# Patient Record
Sex: MALE | Race: BLACK OR AFRICAN AMERICAN | NOT HISPANIC OR LATINO | Employment: OTHER | URBAN - METROPOLITAN AREA
[De-identification: names, ages, dates, MRNs, and addresses within clinical notes are randomized per-mention and may not be internally consistent; named-entity substitution may affect disease eponyms.]

---

## 2017-07-31 ENCOUNTER — OFFICE VISIT (OUTPATIENT)
Dept: UROLOGY | Facility: CLINIC | Age: 42
End: 2017-07-31
Payer: MEDICAID

## 2017-07-31 VITALS
WEIGHT: 315 LBS | BODY MASS INDEX: 40.43 KG/M2 | DIASTOLIC BLOOD PRESSURE: 89 MMHG | SYSTOLIC BLOOD PRESSURE: 138 MMHG | HEIGHT: 74 IN | HEART RATE: 86 BPM

## 2017-07-31 DIAGNOSIS — N43.3 HYDROCELE, UNSPECIFIED HYDROCELE TYPE: Primary | ICD-10-CM

## 2017-07-31 LAB
BILIRUB SERPL-MCNC: NORMAL MG/DL
BLOOD URINE, POC: NORMAL
COLOR, POC UA: YELLOW
GLUCOSE UR QL STRIP: NORMAL
KETONES UR QL STRIP: NORMAL
LEUKOCYTE ESTERASE URINE, POC: NORMAL
NITRITE, POC UA: NORMAL
PH, POC UA: 5
PROTEIN, POC: 100
SPECIFIC GRAVITY, POC UA: 1.02
UROBILINOGEN, POC UA: NORMAL

## 2017-07-31 PROCEDURE — 99214 OFFICE O/P EST MOD 30 MIN: CPT | Mod: 25,S$PBB,, | Performed by: UROLOGY

## 2017-07-31 PROCEDURE — 99999 PR PBB SHADOW E&M-EST. PATIENT-LVL III: CPT | Mod: PBBFAC,,, | Performed by: UROLOGY

## 2017-07-31 PROCEDURE — 99213 OFFICE O/P EST LOW 20 MIN: CPT | Mod: PBBFAC,PO | Performed by: UROLOGY

## 2017-07-31 PROCEDURE — 55000 DRAINAGE OF HYDROCELE: CPT | Mod: PBBFAC,PO | Performed by: UROLOGY

## 2017-07-31 PROCEDURE — 55000 DRAINAGE OF HYDROCELE: CPT | Mod: S$PBB,,, | Performed by: UROLOGY

## 2017-07-31 PROCEDURE — 81002 URINALYSIS NONAUTO W/O SCOPE: CPT | Mod: PBBFAC,PO | Performed by: UROLOGY

## 2017-07-31 NOTE — PROGRESS NOTES
Subjective:       Patient ID: Paul Li is a 41 y.o. male.    Chief Complaint: Other (hydrocele)    HPI     41 year old with a lrage right hydrocele.  It has been present for year.  Previously seen by Dr. Spangler.  Scrotal ultrasound confirmed a large right hydrocele.  He say it has continued to enlarge and is no causing significant discomfort.  He has no voiding complaints.  He denies hematuria and dysuria.  He previously elected observation but due to discomfort he wants treatment.   He also has been having worsening lower extremity swelling and is scheduled to see PCP.  He has no LE pain.  We discussed treatment options for the hydrocele and he wants needle drainage.  He known that this fluid will most likely reacumulate over time.  Urine dipstick shows negative for nitrites, leukocytes, red blood cells, positive for protein.    Review of Systems   Constitutional: Negative for fever.   Genitourinary: Negative for dysuria and hematuria.       Objective:      Physical Exam   Constitutional: He is oriented to person, place, and time. He appears well-developed and well-nourished.   HENT:   Head: Normocephalic and atraumatic.   Eyes: Conjunctivae are normal.   Cardiovascular: Normal rate.    Pulmonary/Chest: Effort normal.   Abdominal: Soft. Hernia confirmed negative in the right inguinal area and confirmed negative in the left inguinal area.   Genitourinary: Penis normal. Mass: large right hydrocele. Left testis shows no mass and no tenderness.   Musculoskeletal: Normal range of motion.   Lymphadenopathy: No inguinal adenopathy noted on the right or left side.   Neurological: He is alert and oriented to person, place, and time.   Skin: Skin is warm and dry. No rash noted.   Psychiatric: He has a normal mood and affect.   Vitals reviewed.      Procedure     Right hydrocele drainage:     The scrotal was prepped and draped sterilely. Using sterile technique, I introduced a Jelco needle into the hydrocele sac. I then  aspirated approximately 640 milliliter(s) of straw-colored fluid. He tolerated the procedure well and there were no complications there was no bleeding from the injection site. No path was sent.      Assessment:       1. Hydrocele, unspecified hydrocele type        Plan:       Hydrocele, unspecified hydrocele type  -     POCT urine dipstick without microscope

## 2017-10-24 ENCOUNTER — OFFICE VISIT (OUTPATIENT)
Dept: FAMILY MEDICINE | Facility: CLINIC | Age: 42
End: 2017-10-24
Payer: MEDICAID

## 2017-10-24 VITALS
TEMPERATURE: 98 F | BODY MASS INDEX: 40.43 KG/M2 | HEART RATE: 68 BPM | DIASTOLIC BLOOD PRESSURE: 80 MMHG | HEIGHT: 74 IN | WEIGHT: 315 LBS | SYSTOLIC BLOOD PRESSURE: 170 MMHG | RESPIRATION RATE: 18 BRPM

## 2017-10-24 DIAGNOSIS — R60.0 LEG EDEMA, RIGHT: ICD-10-CM

## 2017-10-24 DIAGNOSIS — R06.83 SNORING: ICD-10-CM

## 2017-10-24 DIAGNOSIS — M25.561 RIGHT KNEE PAIN, UNSPECIFIED CHRONICITY: ICD-10-CM

## 2017-10-24 DIAGNOSIS — I10 HYPERTENSION, UNSPECIFIED TYPE: ICD-10-CM

## 2017-10-24 DIAGNOSIS — Z00.00 PREVENTATIVE HEALTH CARE: Primary | ICD-10-CM

## 2017-10-24 DIAGNOSIS — E66.01 MORBID OBESITY WITH BMI OF 50.0-59.9, ADULT: ICD-10-CM

## 2017-10-24 PROCEDURE — 90471 IMMUNIZATION ADMIN: CPT | Mod: PBBFAC,PO

## 2017-10-24 PROCEDURE — 99214 OFFICE O/P EST MOD 30 MIN: CPT | Mod: PBBFAC,PO | Performed by: FAMILY MEDICINE

## 2017-10-24 PROCEDURE — 99396 PREV VISIT EST AGE 40-64: CPT | Mod: S$PBB,,, | Performed by: FAMILY MEDICINE

## 2017-10-24 PROCEDURE — 90715 TDAP VACCINE 7 YRS/> IM: CPT | Mod: PBBFAC,PO

## 2017-10-24 PROCEDURE — 99999 PR PBB SHADOW E&M-EST. PATIENT-LVL IV: CPT | Mod: PBBFAC,,, | Performed by: FAMILY MEDICINE

## 2017-10-24 RX ORDER — LISINOPRIL 20 MG/1
20 TABLET ORAL DAILY
Qty: 30 TABLET | Refills: 11 | Status: SHIPPED | OUTPATIENT
Start: 2017-10-24 | End: 2018-05-21 | Stop reason: DRUGHIGH

## 2017-10-24 NOTE — MEDICAL/APP STUDENT
Subjective:       Patient ID: Paul Li is a 42 y.o. male.    Chief Complaint: Knee pain and edema  Knee pain  - 1 year hx edema right lower extremity  - past Friday - pain - extreme edema in knee  - received in ED hydrocodone (pain), anti-inflammatory, and likely a steroid shot, possibly lasix as pt reports excessive urination after meds    HTN  - trial lisinopril 20mg  - avoid CCBs for now due to     Annual exam  - labs: CBC, Lipids, HbA1C    Weight loss  - previously trialed vyvanse or adderall - with positive results  - pt is interested in gastric sleeve  - brother had gastric sleeve with positive results  - pt intends to return to gym - wanted to evaluate his HTN prior to starting exercise regimen    Hx hydrocele   - Previous scrotal ultrasound confirmed a large right hydrocele - needle drainage by Dr. Burnette  -  no voiding complaints, denies hematuria and dysuria.     Diet  - pt cut out rice, past, and seafood  - counseled on lean protein and reducing carbs      Review of Systems   Constitutional: Negative.    HENT: Negative.    Respiratory: Positive for shortness of breath.    Cardiovascular: Negative.    Gastrointestinal: Negative.    Genitourinary: Negative.    Musculoskeletal: Positive for joint swelling.   Skin: Negative.    Neurological: Negative.    Psychiatric/Behavioral: Negative.        Objective:      Physical Exam   Constitutional: He is oriented to person, place, and time. He appears well-developed and well-nourished.   Neck: Normal range of motion.   Cardiovascular: Normal rate, regular rhythm, normal heart sounds and intact distal pulses.    No murmur heard.  Pulmonary/Chest: Effort normal and breath sounds normal. No respiratory distress. He has no wheezes.   Abdominal: Soft. Bowel sounds are normal.   Musculoskeletal: He exhibits edema.        Right knee: He exhibits swelling.   Lymphadenopathy:     He has no cervical adenopathy.   Neurological: He is alert and oriented to person, place,  and time.   Skin: Skin is warm and dry.   Psychiatric: He has a normal mood and affect. His behavior is normal. Judgment and thought content normal.       Assessment:   Knee pain  Lower leg edema  HTN  Annual       Plan:   Knee pain  - ACL, PCL, MCL, LCL all normal  - lower leg edema may be contributing to knee edema  - obtain R knee xrays from outside hospital     Right leg edema  - US right lower extremity - rule out DVT  - start Lasix 20mg    HTN  - trial lisinopril 20mg  - avoid CCBs for now due     Annual exam  - labs: CBC, Lipids, HbA1C    Weight loss  - previously trialed vyvanse or adderall - with positive results    F/u 3 months with A1C

## 2017-10-24 NOTE — PROGRESS NOTES
Subjective:       Patient ID: Paul Li is a 42 y.o. male.    Chief Complaint: Preventative Health Care (Physical: [place]) and Knee Pain    Here for a general exam.  He was previously seeing Dr. Chamberlain.    - 1 year hx edema right lower extremity; no injury or previous surgery    Knee pain  - past Friday - pain - extreme pain and edema in knee that started in the middle of the night  - received in ED hydrocodone (pain), anti-inflammatory, and likely a steroid shot, possibly lasix as pt reports excessive urination after meds     HTN  - never has taken medication for this in the past      Weight loss  - previously trialed phenteramine- with positive results  - pt is interested in gastric sleeve  - brother had gastric sleeve with positive results  - pt intends to return to gym - wanted to evaluate his HTN prior to starting exercise regimen   pt cut out rice, past, and seafood     Hx hydrocele   - Previous scrotal ultrasound confirmed a large right hydrocele - needle drainage by Dr. Burnette  -  no voiding complaints, denies hematuria and dysuria.     He is a  and gets a CDL physical every 2 years. Last one was 6 months ago and sleep study was suggested.          Past Medical History:   Diagnosis Date    HTN (hypertension)     Leg edema, right     Prediabetes        No past surgical history on file.    Review of patient's allergies indicates:  No Known Allergies    Social History     Social History    Marital status:      Spouse name: N/A    Number of children: 4    Years of education: N/A     Occupational History          Social History Main Topics    Smoking status: Former Smoker     Types: Cigarettes     Quit date: 11/23/2013    Smokeless tobacco: Former User     Quit date: 11/23/2013      Comment: e cigs    Alcohol use Not on file    Drug use: Unknown    Sexual activity: Not on file     Other Topics Concern    Not on file     Social History Narrative    [place]       No current outpatient prescriptions on file prior to visit.     No current facility-administered medications on file prior to visit.        Family History   Problem Relation Age of Onset    Hypertension Mother     Diabetes Mother     Hypertension Father     Colon cancer Maternal Grandfather     Breast cancer Paternal Grandmother        Review of Systems   Constitutional: Negative for appetite change, chills, fever and unexpected weight change.   HENT: Negative for sore throat and trouble swallowing.    Eyes: Negative for pain and visual disturbance.   Respiratory: Negative for cough, chest tightness, shortness of breath and wheezing.    Cardiovascular: Positive for leg swelling (right leg). Negative for chest pain and palpitations.   Gastrointestinal: Negative for abdominal pain, blood in stool, constipation, diarrhea and nausea.   Genitourinary: Negative for difficulty urinating, dysuria and hematuria.   Musculoskeletal: Positive for arthralgias and back pain. Negative for gait problem and neck pain.   Skin: Negative for rash and wound.   Neurological: Negative for dizziness, weakness, light-headedness and headaches.   Hematological: Negative for adenopathy.   Psychiatric/Behavioral: Negative for dysphoric mood.       Objective:      BP (!) 170/80 Comment: Wrist  Pulse 68   Temp 98 °F (36.7 °C) (Oral)   Resp 18   Ht 6' 2" (1.88 m)   Wt (!) 192.9 kg (425 lb 4.3 oz)   BMI 54.60 kg/m²   Physical Exam   Constitutional: He is oriented to person, place, and time. He appears well-developed and well-nourished. He is active. No distress.   HENT:   Head: Normocephalic and atraumatic.   Right Ear: External ear normal.   Left Ear: External ear normal.   Mouth/Throat: Uvula is midline, oropharynx is clear and moist and mucous membranes are normal. No oropharyngeal exudate.   Eyes: Conjunctivae, EOM and lids are normal. Pupils are equal, round, and reactive to light.   Neck: Normal range of motion, full " passive range of motion without pain and phonation normal. Neck supple. No thyroid mass and no thyromegaly present.   Cardiovascular: Normal rate, regular rhythm, normal heart sounds and intact distal pulses.  Exam reveals no gallop and no friction rub.    No murmur heard.  Pulmonary/Chest: Effort normal and breath sounds normal. No respiratory distress. He has no wheezes. He has no rales.   Musculoskeletal: Normal range of motion.        Right knee: Normal.        Right lower leg: He exhibits edema.        Left lower leg: He exhibits no edema.   Lymphadenopathy:     He has no cervical adenopathy.   Neurological: He is alert and oriented to person, place, and time. No cranial nerve deficit. Coordination normal.   Skin: Skin is warm and dry.   Psychiatric: He has a normal mood and affect. His speech is normal and behavior is normal. Judgment and thought content normal.       Assessment:       1. Preventative health care    2. Leg edema, right    3. Morbid obesity with BMI of 50.0-59.9, adult    4. Hypertension, unspecified type    5. Snoring        Plan:       Preventative health care  -     Tdap Vaccine  -     TSH; Future; Expected date: 10/24/2017  -     Comprehensive metabolic panel; Future; Expected date: 10/24/2017  -     Hemoglobin A1c; Future; Expected date: 10/24/2017  -     Lipid panel; Future; Expected date: 10/24/2017  -     CBC auto differential; Future; Expected date: 10/24/2017    Leg edema, right  -     US Lower Extremity Veins Right; Future; Expected date: 10/24/2017  -     Uric acid; Future; Expected date: 04/22/2018    Morbid obesity with BMI of 50.0-59.9, adult  -     Ambulatory consult to Bariatric Surgery  -     Ambulatory consult to Sleep Disorders    Hypertension, unspecified type  -     lisinopril (PRINIVIL,ZESTRIL) 20 MG tablet; Take 1 tablet (20 mg total) by mouth once daily.  Dispense: 30 tablet; Refill: 11    Snoring  -     Ambulatory consult to Sleep Disorders    Other orders  -      Cancel: Lipid panel; Future; Expected date: 10/24/2017        Begin lisinopril and f/u in 1 month to recheck BP  Labs soon  Discussed need for aggressive weight loss due to multiple associated comorbid factors  Begin walking program and general calorie reduction  Suspect recent episode of acute right knee pain may have been related to gout episode  Counseled on regular exercise, maintenance of a healthy weight, balanced diet rich in fruits/vegetables and lean protein, and avoidance of unhealthy habits like smoking and excessive alcohol intake.

## 2017-10-25 ENCOUNTER — TELEPHONE (OUTPATIENT)
Dept: FAMILY MEDICINE | Facility: CLINIC | Age: 42
End: 2017-10-25

## 2017-10-25 NOTE — TELEPHONE ENCOUNTER
Appt scheduled.  States he will have commercial insurance soon, once that becomes effective we will schedule consultation bariatric surgeon.

## 2017-10-25 NOTE — TELEPHONE ENCOUNTER
"----- Message from Sylvia Martinez sent at 10/24/2017  6:33 PM CDT -----  Contact: self at check out  Dr Arroyo saw this gentleman Tuesday 10- and referred him to Bariatric Surgery. [facility] does not take his insurance. Please advise the patient.    Also Dr Arroyo wants to see him back in 4 weeks. There are no "B" openings for me to schedule.    Thank you  "

## 2017-10-28 ENCOUNTER — LAB VISIT (OUTPATIENT)
Dept: LAB | Facility: HOSPITAL | Age: 42
End: 2017-10-28
Attending: FAMILY MEDICINE
Payer: MEDICAID

## 2017-10-28 DIAGNOSIS — R60.0 LEG EDEMA, RIGHT: ICD-10-CM

## 2017-10-28 DIAGNOSIS — Z00.00 PREVENTATIVE HEALTH CARE: ICD-10-CM

## 2017-10-28 LAB
ALBUMIN SERPL BCP-MCNC: 3.7 G/DL
ALP SERPL-CCNC: 85 U/L
ALT SERPL W/O P-5'-P-CCNC: 32 U/L
ANION GAP SERPL CALC-SCNC: 11 MMOL/L
AST SERPL-CCNC: 21 U/L
BASOPHILS # BLD AUTO: 0.02 K/UL
BASOPHILS NFR BLD: 0.4 %
BILIRUB SERPL-MCNC: 0.7 MG/DL
BUN SERPL-MCNC: 14 MG/DL
CALCIUM SERPL-MCNC: 9.5 MG/DL
CHLORIDE SERPL-SCNC: 108 MMOL/L
CHOLEST SERPL-MCNC: 154 MG/DL
CHOLEST/HDLC SERPL: 4.8 {RATIO}
CO2 SERPL-SCNC: 21 MMOL/L
CREAT SERPL-MCNC: 1.2 MG/DL
DIFFERENTIAL METHOD: ABNORMAL
EOSINOPHIL # BLD AUTO: 0.1 K/UL
EOSINOPHIL NFR BLD: 1.6 %
ERYTHROCYTE [DISTWIDTH] IN BLOOD BY AUTOMATED COUNT: 14.9 %
EST. GFR  (AFRICAN AMERICAN): >60 ML/MIN/1.73 M^2
EST. GFR  (NON AFRICAN AMERICAN): >60 ML/MIN/1.73 M^2
GLUCOSE SERPL-MCNC: 105 MG/DL
HCT VFR BLD AUTO: 41.2 %
HDLC SERPL-MCNC: 32 MG/DL
HDLC SERPL: 20.8 %
HGB BLD-MCNC: 12.9 G/DL
IMM GRANULOCYTES # BLD AUTO: 0.02 K/UL
IMM GRANULOCYTES NFR BLD AUTO: 0.4 %
LDLC SERPL CALC-MCNC: 109.4 MG/DL
LYMPHOCYTES # BLD AUTO: 2.3 K/UL
LYMPHOCYTES NFR BLD: 46.8 %
MCH RBC QN AUTO: 24 PG
MCHC RBC AUTO-ENTMCNC: 31.3 G/DL
MCV RBC AUTO: 77 FL
MONOCYTES # BLD AUTO: 0.4 K/UL
MONOCYTES NFR BLD: 8.1 %
NEUTROPHILS # BLD AUTO: 2.1 K/UL
NEUTROPHILS NFR BLD: 42.7 %
NONHDLC SERPL-MCNC: 122 MG/DL
NRBC BLD-RTO: 0 /100 WBC
PLATELET # BLD AUTO: 228 K/UL
PMV BLD AUTO: 11.2 FL
POTASSIUM SERPL-SCNC: 4.3 MMOL/L
PROT SERPL-MCNC: 8 G/DL
RBC # BLD AUTO: 5.38 M/UL
SODIUM SERPL-SCNC: 140 MMOL/L
TRIGL SERPL-MCNC: 63 MG/DL
TSH SERPL DL<=0.005 MIU/L-ACNC: 1.33 UIU/ML
URATE SERPL-MCNC: 8.3 MG/DL
WBC # BLD AUTO: 4.94 K/UL

## 2017-10-28 PROCEDURE — 83036 HEMOGLOBIN GLYCOSYLATED A1C: CPT

## 2017-10-28 PROCEDURE — 84550 ASSAY OF BLOOD/URIC ACID: CPT

## 2017-10-28 PROCEDURE — 84443 ASSAY THYROID STIM HORMONE: CPT

## 2017-10-28 PROCEDURE — 80061 LIPID PANEL: CPT

## 2017-10-28 PROCEDURE — 85025 COMPLETE CBC W/AUTO DIFF WBC: CPT

## 2017-10-28 PROCEDURE — 80053 COMPREHEN METABOLIC PANEL: CPT

## 2017-10-28 PROCEDURE — 36415 COLL VENOUS BLD VENIPUNCTURE: CPT | Mod: PO

## 2017-10-29 LAB
ESTIMATED AVG GLUCOSE: 120 MG/DL
HBA1C MFR BLD HPLC: 5.8 %

## 2017-11-01 ENCOUNTER — PATIENT MESSAGE (OUTPATIENT)
Dept: FAMILY MEDICINE | Facility: CLINIC | Age: 42
End: 2017-11-01

## 2017-11-14 PROBLEM — I10 HTN (HYPERTENSION): Status: ACTIVE | Noted: 2017-11-14

## 2017-11-28 ENCOUNTER — OFFICE VISIT (OUTPATIENT)
Dept: FAMILY MEDICINE | Facility: CLINIC | Age: 42
End: 2017-11-28
Payer: MEDICAID

## 2017-11-28 VITALS
DIASTOLIC BLOOD PRESSURE: 80 MMHG | WEIGHT: 315 LBS | OXYGEN SATURATION: 97 % | HEIGHT: 74 IN | BODY MASS INDEX: 40.43 KG/M2 | SYSTOLIC BLOOD PRESSURE: 142 MMHG | HEART RATE: 71 BPM

## 2017-11-28 DIAGNOSIS — I10 HYPERTENSION, UNSPECIFIED TYPE: Primary | ICD-10-CM

## 2017-11-28 DIAGNOSIS — E79.0 HYPERURICEMIA: ICD-10-CM

## 2017-11-28 PROCEDURE — 99213 OFFICE O/P EST LOW 20 MIN: CPT | Mod: S$PBB,,, | Performed by: FAMILY MEDICINE

## 2017-11-28 PROCEDURE — 99999 PR PBB SHADOW E&M-EST. PATIENT-LVL III: CPT | Mod: PBBFAC,,, | Performed by: FAMILY MEDICINE

## 2017-11-28 PROCEDURE — 99213 OFFICE O/P EST LOW 20 MIN: CPT | Mod: PBBFAC,PO | Performed by: FAMILY MEDICINE

## 2017-11-28 RX ORDER — COLCHICINE 0.6 MG/1
0.6 TABLET ORAL 2 TIMES DAILY PRN
Qty: 60 TABLET | Refills: 2 | Status: SHIPPED | OUTPATIENT
Start: 2017-11-28 | End: 2017-12-01 | Stop reason: SDUPTHER

## 2017-11-28 NOTE — PROGRESS NOTES
Subjective:       Patient ID: Paul Li is a 42 y.o. male.    Chief Complaint: Hypertension (6 week f/u)    Here for HTN follow-up and to discuss recent labs    HTN - tolerating lisinopril 20mg daily since last appt; HA have resolved. He did feel fatigued initially when he started the medication.                   Hypertension   Pertinent negatives include no chest pain, headaches, neck pain, palpitations or shortness of breath.       Past Medical History:   Diagnosis Date    HTN (hypertension)     Leg edema, right     Prediabetes        No past surgical history on file.    Review of patient's allergies indicates:  No Known Allergies    Social History     Social History    Marital status:      Spouse name: N/A    Number of children: 4    Years of education: N/A     Occupational History          Social History Main Topics    Smoking status: Former Smoker     Types: Cigarettes     Quit date: 11/23/2013    Smokeless tobacco: Former User     Quit date: 11/23/2013      Comment: e cigs    Alcohol use Not on file    Drug use: Unknown    Sexual activity: Not on file     Other Topics Concern    Not on file     Social History Narrative    From [place]       Current Outpatient Prescriptions on File Prior to Visit   Medication Sig Dispense Refill    lisinopril (PRINIVIL,ZESTRIL) 20 MG tablet Take 1 tablet (20 mg total) by mouth once daily. 30 tablet 11     No current facility-administered medications on file prior to visit.        Family History   Problem Relation Age of Onset    Hypertension Mother     Diabetes Mother     Hypertension Father     Colon cancer Maternal Grandfather     Breast cancer Paternal Grandmother        Review of Systems   Constitutional: Negative for appetite change, chills, fever and unexpected weight change.   HENT: Negative for sore throat and trouble swallowing.    Eyes: Negative for pain and visual disturbance.   Respiratory: Negative for cough, chest  "tightness, shortness of breath and wheezing.    Cardiovascular: Positive for leg swelling (right leg). Negative for chest pain and palpitations.   Gastrointestinal: Negative for abdominal pain, blood in stool, constipation, diarrhea and nausea.   Genitourinary: Negative for difficulty urinating, dysuria and hematuria.   Musculoskeletal: Positive for arthralgias and back pain. Negative for gait problem and neck pain.   Skin: Negative for rash and wound.   Neurological: Negative for dizziness, weakness, light-headedness and headaches.   Hematological: Negative for adenopathy.   Psychiatric/Behavioral: Negative for dysphoric mood.       Objective:      BP (!) 142/80   Pulse 71   Ht 6' 2" (1.88 m)   Wt (!) 193 kg (425 lb 7.8 oz)   SpO2 97%   BMI 54.63 kg/m²   Physical Exam   Constitutional: He is oriented to person, place, and time. He appears well-developed and well-nourished. He is active. No distress.   HENT:   Head: Normocephalic and atraumatic.   Right Ear: External ear normal.   Left Ear: External ear normal.   Mouth/Throat: Uvula is midline, oropharynx is clear and moist and mucous membranes are normal. No oropharyngeal exudate.   Eyes: Conjunctivae, EOM and lids are normal. Pupils are equal, round, and reactive to light.   Neck: Normal range of motion, full passive range of motion without pain and phonation normal. Neck supple. No thyroid mass and no thyromegaly present.   Cardiovascular: Normal rate, regular rhythm, normal heart sounds and intact distal pulses.  Exam reveals no gallop and no friction rub.    No murmur heard.  Pulmonary/Chest: Effort normal and breath sounds normal. No respiratory distress. He has no wheezes. He has no rales.   Musculoskeletal: Normal range of motion.        Right knee: Normal.        Right lower leg: He exhibits edema.        Left lower leg: He exhibits no edema.   Lymphadenopathy:     He has no cervical adenopathy.   Neurological: He is alert and oriented to person, " place, and time. No cranial nerve deficit. Coordination normal.   Skin: Skin is warm and dry.   Psychiatric: He has a normal mood and affect. His speech is normal and behavior is normal. Judgment and thought content normal.       Results for orders placed or performed in visit on 10/28/17   TSH   Result Value Ref Range    TSH 1.334 0.400 - 4.000 uIU/mL   Comprehensive metabolic panel   Result Value Ref Range    Sodium 140 136 - 145 mmol/L    Potassium 4.3 3.5 - 5.1 mmol/L    Chloride 108 95 - 110 mmol/L    CO2 21 (L) 23 - 29 mmol/L    Glucose 105 70 - 110 mg/dL    BUN, Bld 14 6 - 20 mg/dL    Creatinine 1.2 0.5 - 1.4 mg/dL    Calcium 9.5 8.7 - 10.5 mg/dL    Total Protein 8.0 6.0 - 8.4 g/dL    Albumin 3.7 3.5 - 5.2 g/dL    Total Bilirubin 0.7 0.1 - 1.0 mg/dL    Alkaline Phosphatase 85 55 - 135 U/L    AST 21 10 - 40 U/L    ALT 32 10 - 44 U/L    Anion Gap 11 8 - 16 mmol/L    eGFR if African American >60.0 >60 mL/min/1.73 m^2    eGFR if non African American >60.0 >60 mL/min/1.73 m^2   Hemoglobin A1c   Result Value Ref Range    Hemoglobin A1C 5.8 (H) 4.0 - 5.6 %    Estimated Avg Glucose 120 68 - 131 mg/dL   Lipid panel   Result Value Ref Range    Cholesterol 154 120 - 199 mg/dL    Triglycerides 63 30 - 150 mg/dL    HDL 32 (L) 40 - 75 mg/dL    LDL Cholesterol 109.4 63.0 - 159.0 mg/dL    HDL/Chol Ratio 20.8 20.0 - 50.0 %    Total Cholesterol/HDL Ratio 4.8 2.0 - 5.0    Non-HDL Cholesterol 122 mg/dL   CBC auto differential   Result Value Ref Range    WBC 4.94 3.90 - 12.70 K/uL    RBC 5.38 4.60 - 6.20 M/uL    Hemoglobin 12.9 (L) 14.0 - 18.0 g/dL    Hematocrit 41.2 40.0 - 54.0 %    MCV 77 (L) 82 - 98 fL    MCH 24.0 (L) 27.0 - 31.0 pg    MCHC 31.3 (L) 32.0 - 36.0 g/dL    RDW 14.9 (H) 11.5 - 14.5 %    Platelets 228 150 - 350 K/uL    MPV 11.2 9.2 - 12.9 fL    Immature Granulocytes 0.4 0.0 - 0.5 %    Gran # 2.1 1.8 - 7.7 K/uL    Immature Grans (Abs) 0.02 0.00 - 0.04 K/uL    Lymph # 2.3 1.0 - 4.8 K/uL    Mono # 0.4 0.3 - 1.0 K/uL     Eos # 0.1 0.0 - 0.5 K/uL    Baso # 0.02 0.00 - 0.20 K/uL    nRBC 0 0 /100 WBC    Gran% 42.7 38.0 - 73.0 %    Lymph% 46.8 18.0 - 48.0 %    Mono% 8.1 4.0 - 15.0 %    Eosinophil% 1.6 0.0 - 8.0 %    Basophil% 0.4 0.0 - 1.9 %    Differential Method Automated    Uric acid   Result Value Ref Range    Uric Acid 8.3 (H) 3.4 - 7.0 mg/dL       Assessment:       1. Hypertension, unspecified type    2. Hyperuricemia        Plan:       Hypertension, unspecified type    Hyperuricemia  -     colchicine 0.6 mg tablet; Take 1 tablet (0.6 mg total) by mouth 2 (two) times daily as needed.  Dispense: 60 tablet; Refill: 2        continue lisinopril 20mg daily  cochcine as needed for any gout flare ups  Discussed need for aggressive weight loss due to multiple associated comorbid factors  continue walking program and general calorie reduction  Will refer to bariatric surgery once his insurance is established  Sleep study as planned  Counseled on regular exercise, maintenance of a healthy weight, balanced diet rich in fruits/vegetables and lean protein, and avoidance of unhealthy habits like smoking and excessive alcohol intake.

## 2017-12-01 DIAGNOSIS — E79.0 HYPERURICEMIA: ICD-10-CM

## 2017-12-01 RX ORDER — COLCHICINE 0.6 MG/1
0.6 TABLET ORAL 2 TIMES DAILY PRN
Qty: 60 TABLET | Refills: 2 | Status: SHIPPED | OUTPATIENT
Start: 2017-12-01 | End: 2017-12-28 | Stop reason: SDUPTHER

## 2017-12-28 DIAGNOSIS — E79.0 HYPERURICEMIA: ICD-10-CM

## 2017-12-28 RX ORDER — COLCHICINE 0.6 MG/1
0.6 TABLET ORAL 2 TIMES DAILY PRN
Qty: 9 TABLET | Refills: 11 | Status: ON HOLD | OUTPATIENT
Start: 2017-12-28 | End: 2019-10-10

## 2018-02-05 ENCOUNTER — OFFICE VISIT (OUTPATIENT)
Dept: UROLOGY | Facility: CLINIC | Age: 43
End: 2018-02-05
Payer: MEDICAID

## 2018-02-05 VITALS
SYSTOLIC BLOOD PRESSURE: 148 MMHG | DIASTOLIC BLOOD PRESSURE: 83 MMHG | WEIGHT: 315 LBS | HEART RATE: 65 BPM | BODY MASS INDEX: 40.43 KG/M2 | HEIGHT: 74 IN

## 2018-02-05 DIAGNOSIS — N43.3 HYDROCELE, UNSPECIFIED HYDROCELE TYPE: Primary | ICD-10-CM

## 2018-02-05 LAB
BILIRUB SERPL-MCNC: NORMAL MG/DL
BLOOD URINE, POC: NORMAL
COLOR, POC UA: YELLOW
GLUCOSE UR QL STRIP: NORMAL
KETONES UR QL STRIP: NORMAL
LEUKOCYTE ESTERASE URINE, POC: NORMAL
NITRITE, POC UA: NORMAL
PH, POC UA: 5.5
PROTEIN, POC: 30
SPECIFIC GRAVITY, POC UA: 1.02
UROBILINOGEN, POC UA: NORMAL

## 2018-02-05 PROCEDURE — 3008F BODY MASS INDEX DOCD: CPT | Mod: ,,, | Performed by: UROLOGY

## 2018-02-05 PROCEDURE — 99213 OFFICE O/P EST LOW 20 MIN: CPT | Mod: PBBFAC,PO | Performed by: UROLOGY

## 2018-02-05 PROCEDURE — 99999 PR PBB SHADOW E&M-EST. PATIENT-LVL III: CPT | Mod: PBBFAC,,, | Performed by: UROLOGY

## 2018-02-05 PROCEDURE — 55000 DRAINAGE OF HYDROCELE: CPT | Mod: PBBFAC,PO | Performed by: UROLOGY

## 2018-02-05 PROCEDURE — 55000 DRAINAGE OF HYDROCELE: CPT | Mod: S$PBB,RT,, | Performed by: UROLOGY

## 2018-02-05 PROCEDURE — 99213 OFFICE O/P EST LOW 20 MIN: CPT | Mod: 25,S$PBB,, | Performed by: UROLOGY

## 2018-02-05 PROCEDURE — 81002 URINALYSIS NONAUTO W/O SCOPE: CPT | Mod: PBBFAC,PO | Performed by: UROLOGY

## 2018-02-05 NOTE — PROGRESS NOTES
Subjective:       Patient ID: Paul Li is a 42 y.o. male.    Chief Complaint: Testicle Pain (hydocele)    HPI     42 year old with a large right hydrocele.  It has been present for years.   Scrotal ultrasound confirmed a large right hydrocele.  He says it is causing significant discomfort.  He has no voiding complaints.  He denies hematuria and dysuria.  He underwent needle drainage 6 months ago and the discomfort resolved but now the fluid has re-accumulated.  He wants to undergo sugical repair but cannot miss any work at this time.  He is a .   He wants to have it drained again.  Urine dipstick shows negative for nitrites, leukocytes, red blood cells, positive for protein.    Review of Systems   Constitutional: Negative for fever.   Genitourinary: Negative for dysuria and hematuria.       Objective:      Physical Exam   Constitutional: He is oriented to person, place, and time. He appears well-developed and well-nourished.   Pulmonary/Chest: Effort normal.   Abdominal: Soft.   Genitourinary:   Genitourinary Comments: Large right hydrocele   Neurological: He is alert and oriented to person, place, and time.   Skin: No rash noted.   Psychiatric: He has a normal mood and affect.   Vitals reviewed.        Procedure     Right hydrocele drainage:     The scrotal was prepped and draped sterilely. Using sterile technique, I introduced a Jelco needle into the hydrocele sac. I then aspirated approximately 600 milliliter(s) of straw-colored fluid. He tolerated the procedure well and there were no complications there was no bleeding from the injection site. No path was sent.      Assessment:       1. Hydrocele, unspecified hydrocele type        Plan:       Hydrocele, unspecified hydrocele type  -     POCT URINE DIPSTICK WITHOUT MICROSCOPE

## 2018-05-14 ENCOUNTER — PATIENT MESSAGE (OUTPATIENT)
Dept: FAMILY MEDICINE | Facility: CLINIC | Age: 43
End: 2018-05-14

## 2018-05-21 ENCOUNTER — HOSPITAL ENCOUNTER (OUTPATIENT)
Dept: RADIOLOGY | Facility: HOSPITAL | Age: 43
Discharge: HOME | End: 2018-05-21
Attending: NURSE PRACTITIONER
Payer: MEDICAID

## 2018-05-21 ENCOUNTER — OFFICE VISIT (OUTPATIENT)
Dept: FAMILY MEDICINE | Facility: CLINIC | Age: 43
End: 2018-05-21
Payer: MEDICAID

## 2018-05-21 VITALS
RESPIRATION RATE: 16 BRPM | DIASTOLIC BLOOD PRESSURE: 110 MMHG | HEART RATE: 66 BPM | HEIGHT: 74 IN | TEMPERATURE: 98 F | BODY MASS INDEX: 40.43 KG/M2 | WEIGHT: 315 LBS | OXYGEN SATURATION: 98 % | SYSTOLIC BLOOD PRESSURE: 168 MMHG

## 2018-05-21 DIAGNOSIS — I10 HYPERTENSION, UNSPECIFIED TYPE: ICD-10-CM

## 2018-05-21 DIAGNOSIS — E79.0 ELEVATED URIC ACID IN BLOOD: ICD-10-CM

## 2018-05-21 DIAGNOSIS — R51.9 ACUTE NONINTRACTABLE HEADACHE, UNSPECIFIED HEADACHE TYPE: ICD-10-CM

## 2018-05-21 DIAGNOSIS — M54.2 NECK PAIN: ICD-10-CM

## 2018-05-21 DIAGNOSIS — I10 HYPERTENSION, UNSPECIFIED TYPE: Primary | ICD-10-CM

## 2018-05-21 PROCEDURE — 93010 ELECTROCARDIOGRAM REPORT: CPT | Mod: S$PBB,,, | Performed by: INTERNAL MEDICINE

## 2018-05-21 PROCEDURE — 71046 X-RAY EXAM CHEST 2 VIEWS: CPT | Mod: 26,,, | Performed by: RADIOLOGY

## 2018-05-21 PROCEDURE — 93005 ELECTROCARDIOGRAM TRACING: CPT | Mod: PBBFAC,PO | Performed by: INTERNAL MEDICINE

## 2018-05-21 PROCEDURE — 99214 OFFICE O/P EST MOD 30 MIN: CPT | Mod: PBBFAC,25,PO | Performed by: NURSE PRACTITIONER

## 2018-05-21 PROCEDURE — 99214 OFFICE O/P EST MOD 30 MIN: CPT | Mod: S$PBB,,, | Performed by: NURSE PRACTITIONER

## 2018-05-21 PROCEDURE — 99999 PR PBB SHADOW E&M-EST. PATIENT-LVL IV: CPT | Mod: PBBFAC,,, | Performed by: NURSE PRACTITIONER

## 2018-05-21 PROCEDURE — 71046 X-RAY EXAM CHEST 2 VIEWS: CPT | Mod: TC,FY,PO

## 2018-05-21 RX ORDER — LOSARTAN POTASSIUM AND HYDROCHLOROTHIAZIDE 25; 100 MG/1; MG/1
1 TABLET ORAL DAILY
Qty: 90 TABLET | Refills: 3 | Status: SHIPPED | OUTPATIENT
Start: 2018-05-21 | End: 2018-06-15 | Stop reason: SDUPTHER

## 2018-05-21 NOTE — PROGRESS NOTES
Subjective:       Patient ID: Paul Li is a 42 y.o. male.    Chief Complaint: Hypertension; Headache; Fatigue; and Hospital Follow Up ([place]; intestional blockage 3 weeks ago )    Here today to Follow up on HTN - he takes his BP meds at night time   He was in the hospital [place] 3 weeks ago -stayed 48 hours with intestinal blockage. - he had NG tube and this resolved. - He has not N,V, D -since that time. He has been eating well at home   Reviewed and obtained all medical records from [facility]   He has not been working out lately so has gained more weight     Home 150-160-/80-90      Hypertension   This is a recurrent problem. The problem has been gradually worsening since onset. The problem is uncontrolled. Associated symptoms include headaches, malaise/fatigue, neck pain, orthopnea and peripheral edema. Pertinent negatives include no anxiety, blurred vision, chest pain, palpitations, PND, shortness of breath or sweats. Risk factors for coronary artery disease include male gender and obesity. Past treatments include ACE inhibitors. The current treatment provides mild improvement. There are no compliance problems.      Vitals:    05/21/18 0753   BP: (!) 168/110   Pulse: 66   Resp: 16   Temp: 98.3 °F (36.8 °C)     Review of Systems   Constitutional: Positive for fatigue and malaise/fatigue. Negative for fever.   HENT: Negative.    Eyes: Negative.  Negative for blurred vision.   Respiratory: Negative for cough, chest tightness and shortness of breath.    Cardiovascular: Positive for orthopnea and leg swelling. Negative for chest pain, palpitations and PND.   Gastrointestinal: Negative.  Negative for abdominal pain, diarrhea and nausea.   Endocrine: Negative.    Genitourinary: Negative.  Negative for dysuria and hematuria.   Musculoskeletal: Positive for neck pain.   Skin: Negative for color change and rash.   Allergic/Immunologic: Negative.    Neurological: Positive for headaches. Negative for  numbness.   Hematological: Negative.    Psychiatric/Behavioral: Negative.        Past Medical History:   Diagnosis Date    HTN (hypertension)     Leg edema, right     Prediabetes      Objective:      Physical Exam   Constitutional: He is oriented to person, place, and time. He appears well-developed and well-nourished.   HENT:   Head: Normocephalic and atraumatic.   Mouth/Throat: Oropharynx is clear and moist.   Eyes: Conjunctivae and EOM are normal. Pupils are equal, round, and reactive to light.   Neck: Normal range of motion. Neck supple.   Cardiovascular: Normal rate, regular rhythm, normal heart sounds and intact distal pulses.  Exam reveals no friction rub.    No murmur heard.  Pulmonary/Chest: Effort normal and breath sounds normal. No respiratory distress. He has no wheezes.   Abdominal: Soft. Bowel sounds are normal.   Musculoskeletal: Normal range of motion.   Neurological: He is alert and oriented to person, place, and time.   Skin: Skin is warm and dry.   Psychiatric: He has a normal mood and affect. His behavior is normal. Judgment and thought content normal.   Nursing note and vitals reviewed.      Assessment:       1. Hypertension, unspecified type    2. Elevated uric acid in blood    3. Neck pain    4. Acute nonintractable headache, unspecified headache type        Plan:       Hypertension, unspecified type  -     losartan-hydrochlorothiazide 100-25 mg (HYZAAR) 100-25 mg per tablet; Take 1 tablet by mouth once daily.  Dispense: 90 tablet; Refill: 3  -     CBC auto differential; Future; Expected date: 05/21/2018  -     Comprehensive metabolic panel; Future; Expected date: 05/21/2018  -     Lipid panel; Future; Expected date: 05/21/2018  -     TSH; Future; Expected date: 05/21/2018  -     X-Ray Chest PA And Lateral; Future; Expected date: 05/21/2018  -     IN OFFICE EKG 12-LEAD (to Muse)  -     MICROALBUMIN / CREATININE RATIO URINE; Future; Expected date: 05/21/2018    Elevated uric acid in blood  -      Uric acid; Future; Expected date: 05/21/2018    Neck pain    Acute nonintractable headache, unspecified headache type  Feel that this is related to HTN     Will increase med to Losartan 100/25 and he will stop the lisinopril   Will get labs and he will FU with Dr Maier 5/31- 10 days from now  Call with any increased BP readings or issues with meds

## 2018-06-01 ENCOUNTER — PATIENT MESSAGE (OUTPATIENT)
Dept: FAMILY MEDICINE | Facility: CLINIC | Age: 43
End: 2018-06-01

## 2018-06-03 ENCOUNTER — PATIENT MESSAGE (OUTPATIENT)
Dept: FAMILY MEDICINE | Facility: CLINIC | Age: 43
End: 2018-06-03

## 2018-06-03 DIAGNOSIS — N52.9 ERECTILE DYSFUNCTION, UNSPECIFIED ERECTILE DYSFUNCTION TYPE: Primary | ICD-10-CM

## 2018-06-05 RX ORDER — SILDENAFIL CITRATE 20 MG/1
TABLET ORAL
Qty: 20 TABLET | Refills: 11 | Status: SHIPPED | OUTPATIENT
Start: 2018-06-05 | End: 2018-08-16

## 2018-06-05 NOTE — TELEPHONE ENCOUNTER
I have sent meds for him in - sometimes with insurance they are not paid for - But this should be the cheapest.   He has fu for BP- But I wont have his labs - does he want to change it until after I have labs?

## 2018-06-15 ENCOUNTER — LAB VISIT (OUTPATIENT)
Dept: LAB | Facility: HOSPITAL | Age: 43
End: 2018-06-15
Attending: NURSE PRACTITIONER
Payer: MEDICAID

## 2018-06-15 ENCOUNTER — OFFICE VISIT (OUTPATIENT)
Dept: FAMILY MEDICINE | Facility: CLINIC | Age: 43
End: 2018-06-15
Payer: MEDICAID

## 2018-06-15 VITALS
OXYGEN SATURATION: 97 % | SYSTOLIC BLOOD PRESSURE: 142 MMHG | WEIGHT: 315 LBS | HEIGHT: 74 IN | DIASTOLIC BLOOD PRESSURE: 90 MMHG | TEMPERATURE: 98 F | HEART RATE: 72 BPM | BODY MASS INDEX: 40.43 KG/M2 | RESPIRATION RATE: 16 BRPM

## 2018-06-15 DIAGNOSIS — I10 HYPERTENSION, UNSPECIFIED TYPE: Primary | ICD-10-CM

## 2018-06-15 DIAGNOSIS — M10.9 GOUT, UNSPECIFIED CAUSE, UNSPECIFIED CHRONICITY, UNSPECIFIED SITE: ICD-10-CM

## 2018-06-15 DIAGNOSIS — E79.0 ELEVATED URIC ACID IN BLOOD: ICD-10-CM

## 2018-06-15 DIAGNOSIS — I10 HYPERTENSION, UNSPECIFIED TYPE: ICD-10-CM

## 2018-06-15 DIAGNOSIS — N52.9 ERECTILE DYSFUNCTION, UNSPECIFIED ERECTILE DYSFUNCTION TYPE: ICD-10-CM

## 2018-06-15 LAB
ALBUMIN SERPL BCP-MCNC: 3.8 G/DL
ALP SERPL-CCNC: 72 U/L
ALT SERPL W/O P-5'-P-CCNC: 38 U/L
ANION GAP SERPL CALC-SCNC: 10 MMOL/L
AST SERPL-CCNC: 26 U/L
BASOPHILS # BLD AUTO: 0.01 K/UL
BASOPHILS NFR BLD: 0.2 %
BILIRUB SERPL-MCNC: 0.8 MG/DL
BUN SERPL-MCNC: 12 MG/DL
CALCIUM SERPL-MCNC: 9.4 MG/DL
CHLORIDE SERPL-SCNC: 105 MMOL/L
CHOLEST SERPL-MCNC: 148 MG/DL
CHOLEST/HDLC SERPL: 4.6 {RATIO}
CO2 SERPL-SCNC: 23 MMOL/L
CREAT SERPL-MCNC: 1.3 MG/DL
DIFFERENTIAL METHOD: ABNORMAL
EOSINOPHIL # BLD AUTO: 0.1 K/UL
EOSINOPHIL NFR BLD: 1.2 %
ERYTHROCYTE [DISTWIDTH] IN BLOOD BY AUTOMATED COUNT: 14.5 %
EST. GFR  (AFRICAN AMERICAN): >60 ML/MIN/1.73 M^2
EST. GFR  (NON AFRICAN AMERICAN): >60 ML/MIN/1.73 M^2
GLUCOSE SERPL-MCNC: 111 MG/DL
HCT VFR BLD AUTO: 40.5 %
HDLC SERPL-MCNC: 32 MG/DL
HDLC SERPL: 21.6 %
HGB BLD-MCNC: 12.5 G/DL
IMM GRANULOCYTES # BLD AUTO: 0.01 K/UL
IMM GRANULOCYTES NFR BLD AUTO: 0.2 %
LDLC SERPL CALC-MCNC: 101 MG/DL
LYMPHOCYTES # BLD AUTO: 2.5 K/UL
LYMPHOCYTES NFR BLD: 50.2 %
MCH RBC QN AUTO: 24.8 PG
MCHC RBC AUTO-ENTMCNC: 30.9 G/DL
MCV RBC AUTO: 80 FL
MONOCYTES # BLD AUTO: 0.4 K/UL
MONOCYTES NFR BLD: 8.8 %
NEUTROPHILS # BLD AUTO: 1.9 K/UL
NEUTROPHILS NFR BLD: 39.4 %
NONHDLC SERPL-MCNC: 116 MG/DL
NRBC BLD-RTO: 0 /100 WBC
PLATELET # BLD AUTO: 188 K/UL
PMV BLD AUTO: 11.6 FL
POTASSIUM SERPL-SCNC: 4.2 MMOL/L
PROT SERPL-MCNC: 7.6 G/DL
RBC # BLD AUTO: 5.05 M/UL
SODIUM SERPL-SCNC: 138 MMOL/L
TRIGL SERPL-MCNC: 75 MG/DL
TSH SERPL DL<=0.005 MIU/L-ACNC: 0.9 UIU/ML
URATE SERPL-MCNC: 8.5 MG/DL
WBC # BLD AUTO: 4.9 K/UL

## 2018-06-15 PROCEDURE — 80053 COMPREHEN METABOLIC PANEL: CPT

## 2018-06-15 PROCEDURE — 85025 COMPLETE CBC W/AUTO DIFF WBC: CPT

## 2018-06-15 PROCEDURE — 99214 OFFICE O/P EST MOD 30 MIN: CPT | Mod: S$PBB,,, | Performed by: NURSE PRACTITIONER

## 2018-06-15 PROCEDURE — 84443 ASSAY THYROID STIM HORMONE: CPT

## 2018-06-15 PROCEDURE — 80061 LIPID PANEL: CPT

## 2018-06-15 PROCEDURE — 99214 OFFICE O/P EST MOD 30 MIN: CPT | Mod: PBBFAC,PO | Performed by: NURSE PRACTITIONER

## 2018-06-15 PROCEDURE — 36415 COLL VENOUS BLD VENIPUNCTURE: CPT | Mod: PO

## 2018-06-15 PROCEDURE — 99999 PR PBB SHADOW E&M-EST. PATIENT-LVL IV: CPT | Mod: PBBFAC,,, | Performed by: NURSE PRACTITIONER

## 2018-06-15 PROCEDURE — 84550 ASSAY OF BLOOD/URIC ACID: CPT

## 2018-06-15 RX ORDER — AMLODIPINE BESYLATE 10 MG/1
10 TABLET ORAL DAILY
Qty: 30 TABLET | Refills: 11 | Status: SHIPPED | OUTPATIENT
Start: 2018-06-15 | End: 2019-06-20 | Stop reason: SDUPTHER

## 2018-06-15 RX ORDER — LOSARTAN POTASSIUM AND HYDROCHLOROTHIAZIDE 25; 100 MG/1; MG/1
1 TABLET ORAL DAILY
Qty: 90 TABLET | Refills: 3 | Status: SHIPPED | OUTPATIENT
Start: 2018-06-15 | End: 2019-06-20 | Stop reason: SDUPTHER

## 2018-06-15 NOTE — PROGRESS NOTES
Subjective:       Patient ID: Paul Li is a 42 y.o. male.    Chief Complaint: Hypertension (follow up) and Sexual Dysfunction (due to BP medication )    Here today for HTN follow up     Hypertension   This is a chronic problem. The current episode started more than 1 month ago. The problem has been waxing and waning since onset. The problem is uncontrolled. Pertinent negatives include no anxiety, blurred vision, chest pain, headaches, malaise/fatigue, neck pain, orthopnea, palpitations, peripheral edema, PND, shortness of breath or sweats. There are no associated agents to hypertension. Risk factors for coronary artery disease include obesity and male gender. Past treatments include angiotensin blockers and diuretics. The current treatment provides moderate improvement. There are no compliance problems.      Vitals:    06/15/18 1410   BP: (!) 142/90   Pulse: 72   Resp: 16   Temp: 98.4 °F (36.9 °C)     Review of Systems   Constitutional: Positive for unexpected weight change. Negative for fatigue, fever and malaise/fatigue.   HENT: Negative.    Eyes: Negative.  Negative for blurred vision.   Respiratory: Negative.  Negative for cough and shortness of breath.    Cardiovascular: Negative.  Negative for chest pain, palpitations, orthopnea and PND.   Gastrointestinal: Negative.  Negative for abdominal pain, diarrhea and nausea.   Endocrine: Negative.    Genitourinary: Negative.  Negative for dysuria and hematuria.   Musculoskeletal: Negative.  Negative for neck pain.   Skin: Negative for color change and rash.   Allergic/Immunologic: Negative.    Neurological: Negative.  Negative for numbness and headaches.   Hematological: Negative.        Past Medical History:   Diagnosis Date    HTN (hypertension)     Leg edema, right     Prediabetes      Objective:      Physical Exam   Constitutional: He is oriented to person, place, and time. He appears well-developed and well-nourished.   HENT:   Head: Normocephalic and  atraumatic.   Right Ear: External ear normal.   Left Ear: External ear normal.   Mouth/Throat: Oropharynx is clear and moist.   Eyes: Conjunctivae and EOM are normal. Pupils are equal, round, and reactive to light.   Neck: Normal range of motion. Neck supple.   Cardiovascular: Normal rate, regular rhythm, normal heart sounds and intact distal pulses.  Exam reveals no friction rub.    No murmur heard.  Pulmonary/Chest: Effort normal and breath sounds normal. No respiratory distress. He has no wheezes. He has no rales.   Abdominal: Soft. Bowel sounds are normal.   Musculoskeletal: Normal range of motion.   Neurological: He is alert and oriented to person, place, and time.   Skin: Skin is warm and dry.   Psychiatric: He has a normal mood and affect. His behavior is normal. Judgment and thought content normal.   Nursing note and vitals reviewed.      Assessment:       1. Hypertension, unspecified type    2. Gout, unspecified cause, unspecified chronicity, unspecified site    3. Erectile dysfunction, unspecified erectile dysfunction type        Plan:       Hypertension, unspecified type  -     amLODIPine (NORVASC) 10 MG tablet; Take 1 tablet (10 mg total) by mouth once daily.  Dispense: 30 tablet; Refill: 11  -     losartan-hydrochlorothiazide 100-25 mg (HYZAAR) 100-25 mg per tablet; Take 1 tablet by mouth once daily.  Dispense: 90 tablet; Refill: 3    Gout, unspecified cause, unspecified chronicity, unspecified site  Stable     Erectile dysfunction, unspecified erectile dysfunction type  He has not started ED med yet- he will try - discussed needs to get BP managed           Will review labs done today and call back  Needs to be seen in 2 weeks for BP check and Fu in 4 mo if BP stable   Work on weight loss and exercise and better diet

## 2018-06-17 ENCOUNTER — TELEPHONE (OUTPATIENT)
Dept: FAMILY MEDICINE | Facility: CLINIC | Age: 43
End: 2018-06-17

## 2018-06-17 DIAGNOSIS — D64.9 ANEMIA, UNSPECIFIED TYPE: Primary | ICD-10-CM

## 2018-06-18 NOTE — TELEPHONE ENCOUNTER
Please call and tell him that he can start exercising _ His uric acid was high , but without gout attacks - we will monitor   choles is good - but he looks iron def anemic to me - So I am adding Iron and ferritin to these labs - He will need to come back for that lab - as they dont have the blood from Friday   Will call him back when this is complete     Please ask how his BP is doing. ?

## 2018-06-18 NOTE — TELEPHONE ENCOUNTER
Notified patient of results. States that he has been told before that he was anemic. Also states that his Bp is 142/82 in that range but it has only been like 2 days. Advised to call back in few days with some readings. Scheduled for blood work.

## 2018-06-20 ENCOUNTER — LAB VISIT (OUTPATIENT)
Dept: LAB | Facility: HOSPITAL | Age: 43
End: 2018-06-20
Attending: NURSE PRACTITIONER
Payer: MEDICAID

## 2018-06-20 ENCOUNTER — TELEPHONE (OUTPATIENT)
Dept: FAMILY MEDICINE | Facility: CLINIC | Age: 43
End: 2018-06-20

## 2018-06-20 DIAGNOSIS — D64.9 ANEMIA, UNSPECIFIED TYPE: ICD-10-CM

## 2018-06-20 LAB
FERRITIN SERPL-MCNC: 103 NG/ML
IRON SERPL-MCNC: 64 UG/DL
SATURATED IRON: 16 %
TOTAL IRON BINDING CAPACITY: 404 UG/DL
TRANSFERRIN SERPL-MCNC: 273 MG/DL

## 2018-06-20 PROCEDURE — 82728 ASSAY OF FERRITIN: CPT

## 2018-06-20 PROCEDURE — 83540 ASSAY OF IRON: CPT

## 2018-06-20 PROCEDURE — 36415 COLL VENOUS BLD VENIPUNCTURE: CPT | Mod: PO

## 2018-06-20 NOTE — TELEPHONE ENCOUNTER
Attempted to contact patient. Left a message for patient to call and schedule a nurse visit for bp check at his convenience.

## 2018-06-21 NOTE — TELEPHONE ENCOUNTER
Attempted to contact patient to set up a nurse visit for bp check.  Left message for patient to return call to clinic.

## 2018-06-27 ENCOUNTER — CLINICAL SUPPORT (OUTPATIENT)
Dept: FAMILY MEDICINE | Facility: CLINIC | Age: 43
End: 2018-06-27
Payer: MEDICAID

## 2018-06-27 VITALS — SYSTOLIC BLOOD PRESSURE: 144 MMHG | DIASTOLIC BLOOD PRESSURE: 72 MMHG

## 2018-06-27 PROCEDURE — 99999 PR PBB SHADOW E&M-EST. PATIENT-LVL I: CPT | Mod: PBBFAC,,,

## 2018-06-27 PROCEDURE — 99211 OFF/OP EST MAY X REQ PHY/QHP: CPT | Mod: PBBFAC,PO

## 2018-06-27 NOTE — PROGRESS NOTES
Paul Li . 42 y.o. male is here today for Blood Pressure check.   History of HTN yes.    Review of patient's allergies indicates:  No Known Allergies  Creatinine   Date Value Ref Range Status   06/15/2018 1.3 0.5 - 1.4 mg/dL Final     Sodium   Date Value Ref Range Status   06/15/2018 138 136 - 145 mmol/L Final     Potassium   Date Value Ref Range Status   06/15/2018 4.2 3.5 - 5.1 mmol/L Final   ]  Patient verifies taking blood pressure medications on a regular basis at the same time of the day.     Current Outpatient Prescriptions:     amLODIPine (NORVASC) 10 MG tablet, Take 1 tablet (10 mg total) by mouth once daily., Disp: 30 tablet, Rfl: 11    colchicine 0.6 mg tablet, Take 1 tablet (0.6 mg total) by mouth 2 (two) times daily as needed., Disp: 9 tablet, Rfl: 11    losartan-hydrochlorothiazide 100-25 mg (HYZAAR) 100-25 mg per tablet, Take 1 tablet by mouth once daily., Disp: 90 tablet, Rfl: 3    sildenafil (REVATIO) 20 mg Tab, Take 1 hour prior to intercourse, Disp: 20 tablet, Rfl: 11  Does patient have record of home blood pressure readings no. Readings have been averaging 141/79.   Last dose of blood pressure medication was taken at 6/26/18 a.m.  Patient is asymptomatic.   Complains of tingling in legs and arms when when sitting in one position too long.    BP: (!) 144/72 ,   .    Blood pressure reading after 15 minutes was 146/ 80, Pulse   Dr. Arroyo notified.

## 2018-06-28 ENCOUNTER — TELEPHONE (OUTPATIENT)
Dept: FAMILY MEDICINE | Facility: CLINIC | Age: 43
End: 2018-06-28

## 2018-06-28 NOTE — TELEPHONE ENCOUNTER
BP gradually improving. Continue present meds and arrange nurse visit again in 2 weeks for BP check.

## 2018-06-28 NOTE — TELEPHONE ENCOUNTER
Notified patient that he will need to call clinic to schedule BP visit when he gets back in town. Verbalized understanding.

## 2018-07-05 ENCOUNTER — PATIENT MESSAGE (OUTPATIENT)
Dept: FAMILY MEDICINE | Facility: CLINIC | Age: 43
End: 2018-07-05

## 2018-08-02 ENCOUNTER — TELEPHONE (OUTPATIENT)
Dept: UROLOGY | Facility: CLINIC | Age: 43
End: 2018-08-02

## 2018-08-02 NOTE — TELEPHONE ENCOUNTER
----- Message from Angelina Black sent at 8/2/2018  2:57 PM CDT -----  Contact: Patient  Type: Needs Medical Advice    Who Called:  Patient  Symptoms (please be specific):  na  How long has patient had these symptoms:  na  Pharmacy name and phone #:  na  Best Call Back Number:  or   Additional Information: Calling to schedule an appt to be drained. (Hydrocele). Please advise

## 2018-08-16 ENCOUNTER — OFFICE VISIT (OUTPATIENT)
Dept: UROLOGY | Facility: CLINIC | Age: 43
End: 2018-08-16
Payer: MEDICAID

## 2018-08-16 VITALS
BODY MASS INDEX: 40.43 KG/M2 | HEART RATE: 62 BPM | DIASTOLIC BLOOD PRESSURE: 63 MMHG | WEIGHT: 315 LBS | SYSTOLIC BLOOD PRESSURE: 114 MMHG | HEIGHT: 74 IN

## 2018-08-16 DIAGNOSIS — N43.3 HYDROCELE, UNSPECIFIED HYDROCELE TYPE: Primary | ICD-10-CM

## 2018-08-16 LAB
BILIRUB SERPL-MCNC: NORMAL MG/DL
BLOOD URINE, POC: NORMAL
COLOR, POC UA: YELLOW
GLUCOSE UR QL STRIP: NORMAL
KETONES UR QL STRIP: NORMAL
LEUKOCYTE ESTERASE URINE, POC: NORMAL
NITRITE, POC UA: NORMAL
PH, POC UA: 5.5
PROTEIN, POC: NORMAL
SPECIFIC GRAVITY, POC UA: 1.02
UROBILINOGEN, POC UA: 1

## 2018-08-16 PROCEDURE — 99213 OFFICE O/P EST LOW 20 MIN: CPT | Mod: PBBFAC,PO,25 | Performed by: UROLOGY

## 2018-08-16 PROCEDURE — 55000 DRAINAGE OF HYDROCELE: CPT | Mod: S$PBB,RT,, | Performed by: UROLOGY

## 2018-08-16 PROCEDURE — 99999 PR PBB SHADOW E&M-EST. PATIENT-LVL III: CPT | Mod: PBBFAC,,, | Performed by: UROLOGY

## 2018-08-16 PROCEDURE — 99213 OFFICE O/P EST LOW 20 MIN: CPT | Mod: 25,S$PBB,, | Performed by: UROLOGY

## 2018-08-16 PROCEDURE — 81002 URINALYSIS NONAUTO W/O SCOPE: CPT | Mod: PBBFAC,PO | Performed by: UROLOGY

## 2018-08-16 PROCEDURE — 55000 DRAINAGE OF HYDROCELE: CPT | Mod: PBBFAC,PO | Performed by: UROLOGY

## 2018-08-16 NOTE — PROGRESS NOTES
Subjective:       Patient ID: Paul Li Jr. is a 42 y.o. male.    Chief Complaint: drain a hydrocele    HPI     42 year old with a large right hydrocele.  It has been present for years.   Scrotal ultrasound confirmed a large right hydrocele.  He says it is causing significant discomfort.  He has no voiding complaints.  He denies hematuria and dysuria.  He underwent needle drainage on 2 previous occasions the last 6 months ago.  The discomfort resolved but now the fluid has re-accumulated.  We previously discussed sugical repair but cannot miss any work at this time.  He is a .   He wants to have it drained again.  Urine dipstick shows negative for all components.    Review of Systems    Objective:      Physical Exam   Constitutional: He is oriented to person, place, and time. He appears well-developed and well-nourished.   Pulmonary/Chest: Effort normal.   Genitourinary:   Genitourinary Comments: Large Right hydrocele   Neurological: He is alert and oriented to person, place, and time.   Skin: No rash noted.   Psychiatric: He has a normal mood and affect.   Vitals reviewed.      Procedure     Right hydrocele drainage:     The scrotal was prepped and draped sterilely. Using sterile technique, I introduced a Jelco needle into the hydrocele sac. I then aspirated approximately 500 milliliter(s) of straw-colored fluid. He tolerated the procedure well and there were no complications there was no bleeding from the injection site. No path was sent.      Assessment:       1. Hydrocele, unspecified hydrocele type        Plan:       Hydrocele, unspecified hydrocele type  -     POCT urine dipstick without microscope

## 2019-04-25 ENCOUNTER — PATIENT MESSAGE (OUTPATIENT)
Dept: FAMILY MEDICINE | Facility: CLINIC | Age: 44
End: 2019-04-25

## 2019-04-29 ENCOUNTER — PATIENT MESSAGE (OUTPATIENT)
Dept: FAMILY MEDICINE | Facility: CLINIC | Age: 44
End: 2019-04-29

## 2019-05-01 ENCOUNTER — OFFICE VISIT (OUTPATIENT)
Dept: FAMILY MEDICINE | Facility: CLINIC | Age: 44
End: 2019-05-01
Payer: MEDICAID

## 2019-05-01 ENCOUNTER — TELEPHONE (OUTPATIENT)
Dept: RADIOLOGY | Facility: HOSPITAL | Age: 44
End: 2019-05-01

## 2019-05-01 ENCOUNTER — HOSPITAL ENCOUNTER (OUTPATIENT)
Dept: RADIOLOGY | Facility: HOSPITAL | Age: 44
Discharge: HOME | End: 2019-05-01
Attending: PHYSICIAN ASSISTANT
Payer: MEDICAID

## 2019-05-01 ENCOUNTER — TELEPHONE (OUTPATIENT)
Dept: FAMILY MEDICINE | Facility: CLINIC | Age: 44
End: 2019-05-01

## 2019-05-01 ENCOUNTER — OFFICE VISIT (OUTPATIENT)
Dept: UROLOGY | Facility: CLINIC | Age: 44
End: 2019-05-01
Payer: MEDICAID

## 2019-05-01 VITALS
HEART RATE: 72 BPM | WEIGHT: 315 LBS | TEMPERATURE: 99 F | DIASTOLIC BLOOD PRESSURE: 80 MMHG | OXYGEN SATURATION: 97 % | HEIGHT: 74 IN | RESPIRATION RATE: 20 BRPM | SYSTOLIC BLOOD PRESSURE: 130 MMHG | BODY MASS INDEX: 40.43 KG/M2

## 2019-05-01 VITALS — BODY MASS INDEX: 40.43 KG/M2 | WEIGHT: 315 LBS | HEIGHT: 74 IN

## 2019-05-01 DIAGNOSIS — N62 GYNECOMASTIA, MALE: Primary | ICD-10-CM

## 2019-05-01 DIAGNOSIS — N62 GYNECOMASTIA, MALE: ICD-10-CM

## 2019-05-01 DIAGNOSIS — Z30.2 ENCOUNTER FOR STERILIZATION: ICD-10-CM

## 2019-05-01 DIAGNOSIS — N43.3 HYDROCELE: ICD-10-CM

## 2019-05-01 DIAGNOSIS — N43.3 HYDROCELE, UNSPECIFIED HYDROCELE TYPE: Primary | ICD-10-CM

## 2019-05-01 DIAGNOSIS — Z30.09 VASECTOMY EVALUATION: ICD-10-CM

## 2019-05-01 DIAGNOSIS — N43.3 HYDROCELE IN ADULT: Primary | ICD-10-CM

## 2019-05-01 PROCEDURE — 99999 PR PBB SHADOW E&M-EST. PATIENT-LVL III: ICD-10-PCS | Mod: PBBFAC,,, | Performed by: UROLOGY

## 2019-05-01 PROCEDURE — 76642 ULTRASOUND BREAST LIMITED: CPT | Mod: TC,PO,RT

## 2019-05-01 PROCEDURE — 77062 BREAST TOMOSYNTHESIS BI: CPT | Mod: TC,PO

## 2019-05-01 PROCEDURE — 77066 DX MAMMO INCL CAD BI: CPT | Mod: 26,,, | Performed by: RADIOLOGY

## 2019-05-01 PROCEDURE — 77062 BREAST TOMOSYNTHESIS BI: CPT | Mod: 26,,, | Performed by: RADIOLOGY

## 2019-05-01 PROCEDURE — 76642 US BREAST RIGHT LIMITED: ICD-10-PCS | Mod: 26,RT,, | Performed by: RADIOLOGY

## 2019-05-01 PROCEDURE — 77062 MAMMO DIGITAL DIAGNOSTIC BILAT WITH TOMOSYNTHESIS_CAD: ICD-10-PCS | Mod: 26,,, | Performed by: RADIOLOGY

## 2019-05-01 PROCEDURE — 76642 ULTRASOUND BREAST LIMITED: CPT | Mod: 26,RT,, | Performed by: RADIOLOGY

## 2019-05-01 PROCEDURE — 99214 OFFICE O/P EST MOD 30 MIN: CPT | Mod: S$PBB,,, | Performed by: UROLOGY

## 2019-05-01 PROCEDURE — 99213 PR OFFICE/OUTPT VISIT, EST, LEVL III, 20-29 MIN: ICD-10-PCS | Mod: S$PBB,,, | Performed by: PHYSICIAN ASSISTANT

## 2019-05-01 PROCEDURE — 99213 OFFICE O/P EST LOW 20 MIN: CPT | Mod: PBBFAC,PO,25 | Performed by: UROLOGY

## 2019-05-01 PROCEDURE — 99999 PR PBB SHADOW E&M-EST. PATIENT-LVL III: CPT | Mod: PBBFAC,,, | Performed by: UROLOGY

## 2019-05-01 PROCEDURE — 99213 OFFICE O/P EST LOW 20 MIN: CPT | Mod: S$PBB,,, | Performed by: PHYSICIAN ASSISTANT

## 2019-05-01 PROCEDURE — 99215 OFFICE O/P EST HI 40 MIN: CPT | Mod: PBBFAC,25,27,PO | Performed by: PHYSICIAN ASSISTANT

## 2019-05-01 PROCEDURE — 99999 PR PBB SHADOW E&M-EST. PATIENT-LVL V: ICD-10-PCS | Mod: PBBFAC,,, | Performed by: PHYSICIAN ASSISTANT

## 2019-05-01 PROCEDURE — 99999 PR PBB SHADOW E&M-EST. PATIENT-LVL V: CPT | Mod: PBBFAC,,, | Performed by: PHYSICIAN ASSISTANT

## 2019-05-01 PROCEDURE — 77066 MAMMO DIGITAL DIAGNOSTIC BILAT WITH TOMOSYNTHESIS_CAD: ICD-10-PCS | Mod: 26,,, | Performed by: RADIOLOGY

## 2019-05-01 PROCEDURE — 99214 PR OFFICE/OUTPT VISIT, EST, LEVL IV, 30-39 MIN: ICD-10-PCS | Mod: S$PBB,,, | Performed by: UROLOGY

## 2019-05-01 NOTE — TELEPHONE ENCOUNTER
Spoke with [facility] and  states that they do take medicaid, but it depends on which doctor the pt is to see. Reviewed notes per PA with pt. Pt states that he was instructed to come in tomorrow for 10:45 for biospy

## 2019-05-01 NOTE — TELEPHONE ENCOUNTER
..Patient notified of diagnostic mammogram results. Right breast biopsy is scheduled on 5/2/2019.

## 2019-05-01 NOTE — TELEPHONE ENCOUNTER
----- Message from Kathleen Lay MA sent at 5/1/2019 11:45 AM CDT -----  Type: Needs Medical Advice    Who called: Patient   Best Call Back Number: [phone] (home)     Additional Information: Patient was referred to GI for Gynecomastia, male. Department does not accept Medicaid. Please contact to advise

## 2019-05-01 NOTE — PROGRESS NOTES
"Subjective:       Patient ID: Paul Li Jr. is a 43 y.o. male.    Chief Complaint: Advice Only    HPI     43 year old with a large right hydrocele.  It has been present for years.   Scrotal ultrasound confirmed a large right hydrocele.  He says it is causing significant discomfort.  He has no voiding complaints.  He denies hematuria and dysuria.  He underwent needle drainage on 3 previous occasions.  He is now interested in surgical repair.  He is also interested in Vasectomy.  He has one adult child.  Wife recently miscarried a "surprise" pregnancy.  He desires permanent sterility.  He says he has given this careful thought and he knows that vasectomy with result in permanent sterility.   We discussed the risks including bleeding, infection, hematoma, persistent pain and failure.  He knows that he will not be immediately sterile and that he should use alternative birth control until azospermia can be confirmed microscopically.    Urine dipstick shows negative for all components.    Review of Systems   Constitutional: Negative for fever.   Genitourinary: Negative for dysuria and hematuria.       Objective:      Physical Exam   Constitutional: He is oriented to person, place, and time. He appears well-developed and well-nourished.   HENT:   Head: Normocephalic and atraumatic.   Eyes: Conjunctivae are normal.   Cardiovascular: Normal rate.   Pulmonary/Chest: Effort normal.   Genitourinary: Penis normal. Right testis shows no mass (large right hydrocele). Left testis shows no mass and no tenderness.   Musculoskeletal: Normal range of motion. He exhibits no edema.   Neurological: He is alert and oriented to person, place, and time.   Skin: Skin is warm and dry. No rash noted.   Psychiatric: He has a normal mood and affect.   Vitals reviewed.      Assessment:       1. Hydrocele in adult    2. Vasectomy evaluation        Plan:       Hydrocele in adult    Vasectomy evaluation      Schedule Right hydrocele repair and " bilateral vasectomy.

## 2019-05-01 NOTE — PROGRESS NOTES
Subjective:       Patient ID: Paul Li Jr. is a 43 y.o. male.    Chief Complaint: Pain (nipple, started last week, looks inverted at times, sharp pain, possible mole growing on nipple)    HPI   R nipple pain on and off x 1 mos  Discomfort is on and off  No hx trauma  A mole maybe growing on the nipple  R nipple is retracted  Pt started HCTZ about ! Yr ago  Review of Systems   Constitutional: Negative.  Negative for activity change, appetite change, chills, diaphoresis, fatigue, fever and unexpected weight change.   HENT: Negative.  Negative for hearing loss, rhinorrhea and trouble swallowing.    Eyes: Negative for discharge and visual disturbance.   Respiratory: Negative for chest tightness and wheezing.    Cardiovascular: Positive for chest pain. Negative for palpitations.   Gastrointestinal: Negative for blood in stool, constipation, diarrhea and vomiting.   Endocrine: Negative for polydipsia and polyuria.   Genitourinary: Negative for difficulty urinating, hematuria and urgency.   Musculoskeletal: Negative for arthralgias, joint swelling and neck pain.   Neurological: Negative for weakness and headaches.   Psychiatric/Behavioral: Negative for confusion and dysphoric mood.       Objective:      Physical Exam   Constitutional: He appears well-developed and well-nourished. No distress.   HENT:   Head: Normocephalic and atraumatic.   Eyes: Conjunctivae are normal. No scleral icterus.   Neck: Normal range of motion. Neck supple. No tracheal deviation present. No thyromegaly present.   Pulmonary/Chest:   Fullness with minimal tenderness R breast tissue  No distinct mass noted  Nipple is retracted with no lesion   No axillary nodes noted   Lymphadenopathy:     He has no cervical adenopathy.   Skin: Skin is warm and dry. No rash noted.       Assessment:       1. Gynecomastia, male        Plan:       Gynecomastia, male  -     Cancel: Mammo Digital Screening Bilat; Future; Expected date: 05/01/2019  -     Ambulatory  consult to General Surgery    discussed possibility that gynecomastia may be from HCTZ  Discussed the possible need to change BP meds  F/u with  Dr Arroyo

## 2019-05-02 ENCOUNTER — HOSPITAL ENCOUNTER (OUTPATIENT)
Dept: RADIOLOGY | Facility: HOSPITAL | Age: 44
Discharge: HOME | End: 2019-05-02
Attending: PHYSICIAN ASSISTANT
Payer: MEDICAID

## 2019-05-02 DIAGNOSIS — N63.10 BREAST MASS, RIGHT: ICD-10-CM

## 2019-05-02 DIAGNOSIS — N64.4 PAIN OF RIGHT BREAST: ICD-10-CM

## 2019-05-02 DIAGNOSIS — N64.53 NIPPLE RETRACTION: ICD-10-CM

## 2019-05-02 PROCEDURE — 19083 BX BREAST 1ST LESION US IMAG: CPT | Mod: RT,,, | Performed by: RADIOLOGY

## 2019-05-02 PROCEDURE — 27201044 US BREAST BIOPSY WITH IMAGING 1ST SITE RIGHT: Mod: PO

## 2019-05-02 PROCEDURE — 19083 BX BREAST 1ST LESION US IMAG: CPT | Mod: TC,PO,RT

## 2019-05-02 PROCEDURE — 88305 TISSUE EXAM BY PATHOLOGIST: CPT | Mod: 26,,, | Performed by: PATHOLOGY

## 2019-05-02 PROCEDURE — 88360 TUMOR IMMUNOHISTOCHEM/MANUAL: CPT | Performed by: PATHOLOGY

## 2019-05-02 PROCEDURE — 88305 TISSUE EXAM BY PATHOLOGIST: CPT | Performed by: PATHOLOGY

## 2019-05-02 PROCEDURE — 19083 US BREAST BIOPSY WITH IMAGING 1ST SITE RIGHT: ICD-10-PCS | Mod: RT,,, | Performed by: RADIOLOGY

## 2019-05-02 PROCEDURE — 88341 IMHCHEM/IMCYTCHM EA ADD ANTB: CPT | Mod: 26,,, | Performed by: PATHOLOGY

## 2019-05-02 PROCEDURE — 88342 TISSUE SPECIMEN TO PATHOLOGY, RADIOLOGY: ICD-10-PCS | Mod: 26,,, | Performed by: PATHOLOGY

## 2019-05-02 PROCEDURE — 88342 IMHCHEM/IMCYTCHM 1ST ANTB: CPT | Mod: 26,,, | Performed by: PATHOLOGY

## 2019-05-02 PROCEDURE — 88305 TISSUE SPECIMEN TO PATHOLOGY, RADIOLOGY: ICD-10-PCS | Mod: 26,,, | Performed by: PATHOLOGY

## 2019-05-02 PROCEDURE — 88341 TISSUE SPECIMEN TO PATHOLOGY, RADIOLOGY: ICD-10-PCS | Mod: 26,,, | Performed by: PATHOLOGY

## 2019-05-02 PROCEDURE — 88341 IMHCHEM/IMCYTCHM EA ADD ANTB: CPT | Performed by: PATHOLOGY

## 2019-05-02 PROCEDURE — 25000003 PHARM REV CODE 250: Mod: PO | Performed by: PHYSICIAN ASSISTANT

## 2019-05-02 RX ORDER — LIDOCAINE HYDROCHLORIDE 10 MG/ML
10 INJECTION INFILTRATION; PERINEURAL ONCE
Status: COMPLETED | OUTPATIENT
Start: 2019-05-02 | End: 2019-05-02

## 2019-05-02 RX ORDER — LIDOCAINE HYDROCHLORIDE 10 MG/ML
5 INJECTION INFILTRATION; PERINEURAL ONCE
Status: COMPLETED | OUTPATIENT
Start: 2019-05-02 | End: 2019-05-02

## 2019-05-02 RX ADMIN — LIDOCAINE HYDROCHLORIDE 5 ML: 10 INJECTION, SOLUTION EPIDURAL; INFILTRATION; INTRACAUDAL; PERINEURAL at 11:05

## 2019-05-07 ENCOUNTER — PATIENT MESSAGE (OUTPATIENT)
Dept: FAMILY MEDICINE | Facility: CLINIC | Age: 44
End: 2019-05-07

## 2019-05-08 ENCOUNTER — TELEPHONE (OUTPATIENT)
Dept: UROLOGY | Facility: CLINIC | Age: 44
End: 2019-05-08

## 2019-05-08 ENCOUNTER — OFFICE VISIT (OUTPATIENT)
Dept: SURGERY | Facility: CLINIC | Age: 44
End: 2019-05-08
Payer: MEDICAID

## 2019-05-08 ENCOUNTER — LAB VISIT (OUTPATIENT)
Dept: LAB | Facility: HOSPITAL | Age: 44
End: 2019-05-08
Attending: SURGERY
Payer: MEDICAID

## 2019-05-08 VITALS
WEIGHT: 315 LBS | DIASTOLIC BLOOD PRESSURE: 80 MMHG | BODY MASS INDEX: 41.75 KG/M2 | HEIGHT: 73 IN | SYSTOLIC BLOOD PRESSURE: 145 MMHG | HEART RATE: 67 BPM

## 2019-05-08 DIAGNOSIS — Z01.818 PRE-OP TESTING: ICD-10-CM

## 2019-05-08 DIAGNOSIS — C50.921 MALIGNANT NEOPLASM OF RIGHT MALE BREAST, UNSPECIFIED ESTROGEN RECEPTOR STATUS, UNSPECIFIED SITE OF BREAST: ICD-10-CM

## 2019-05-08 DIAGNOSIS — C50.921 MALIGNANT NEOPLASM OF RIGHT MALE BREAST, UNSPECIFIED ESTROGEN RECEPTOR STATUS, UNSPECIFIED SITE OF BREAST: Primary | ICD-10-CM

## 2019-05-08 LAB
ALBUMIN SERPL BCP-MCNC: 4.1 G/DL (ref 3.5–5.2)
ALP SERPL-CCNC: 77 U/L (ref 55–135)
ALT SERPL W/O P-5'-P-CCNC: 50 U/L (ref 10–44)
ANION GAP SERPL CALC-SCNC: 9 MMOL/L (ref 8–16)
AST SERPL-CCNC: 25 U/L (ref 10–40)
BASOPHILS # BLD AUTO: 0.01 K/UL (ref 0–0.2)
BASOPHILS NFR BLD: 0.2 % (ref 0–1.9)
BILIRUB SERPL-MCNC: 0.8 MG/DL (ref 0.1–1)
BUN SERPL-MCNC: 15 MG/DL (ref 6–20)
CALCIUM SERPL-MCNC: 10 MG/DL (ref 8.7–10.5)
CHLORIDE SERPL-SCNC: 103 MMOL/L (ref 95–110)
CO2 SERPL-SCNC: 26 MMOL/L (ref 23–29)
CREAT SERPL-MCNC: 1.3 MG/DL (ref 0.5–1.4)
DIFFERENTIAL METHOD: ABNORMAL
EOSINOPHIL # BLD AUTO: 0.1 K/UL (ref 0–0.5)
EOSINOPHIL NFR BLD: 1.1 % (ref 0–8)
ERYTHROCYTE [DISTWIDTH] IN BLOOD BY AUTOMATED COUNT: 14.7 % (ref 11.5–14.5)
EST. GFR  (AFRICAN AMERICAN): >60 ML/MIN/1.73 M^2
EST. GFR  (NON AFRICAN AMERICAN): >60 ML/MIN/1.73 M^2
GLUCOSE SERPL-MCNC: 87 MG/DL (ref 70–110)
HCT VFR BLD AUTO: 40.5 % (ref 40–54)
HGB BLD-MCNC: 12.7 G/DL (ref 14–18)
IMM GRANULOCYTES # BLD AUTO: 0.01 K/UL (ref 0–0.04)
IMM GRANULOCYTES NFR BLD AUTO: 0.2 % (ref 0–0.5)
LYMPHOCYTES # BLD AUTO: 2.9 K/UL (ref 1–4.8)
LYMPHOCYTES NFR BLD: 47.1 % (ref 18–48)
MCH RBC QN AUTO: 25 PG (ref 27–31)
MCHC RBC AUTO-ENTMCNC: 31.4 G/DL (ref 32–36)
MCV RBC AUTO: 80 FL (ref 82–98)
MONOCYTES # BLD AUTO: 0.5 K/UL (ref 0.3–1)
MONOCYTES NFR BLD: 8.6 % (ref 4–15)
NEUTROPHILS # BLD AUTO: 2.7 K/UL (ref 1.8–7.7)
NEUTROPHILS NFR BLD: 42.8 % (ref 38–73)
NRBC BLD-RTO: 0 /100 WBC
PLATELET # BLD AUTO: 233 K/UL (ref 150–350)
PMV BLD AUTO: 11.7 FL (ref 9.2–12.9)
POTASSIUM SERPL-SCNC: 3.9 MMOL/L (ref 3.5–5.1)
PROT SERPL-MCNC: 8.1 G/DL (ref 6–8.4)
RBC # BLD AUTO: 5.09 M/UL (ref 4.6–6.2)
SODIUM SERPL-SCNC: 138 MMOL/L (ref 136–145)
WBC # BLD AUTO: 6.18 K/UL (ref 3.9–12.7)

## 2019-05-08 PROCEDURE — 99214 OFFICE O/P EST MOD 30 MIN: CPT | Mod: PBBFAC,PO | Performed by: SURGERY

## 2019-05-08 PROCEDURE — 85025 COMPLETE CBC W/AUTO DIFF WBC: CPT

## 2019-05-08 PROCEDURE — 36415 COLL VENOUS BLD VENIPUNCTURE: CPT | Mod: PO

## 2019-05-08 PROCEDURE — 99999 PR PBB SHADOW E&M-EST. PATIENT-LVL IV: ICD-10-PCS | Mod: PBBFAC,,, | Performed by: SURGERY

## 2019-05-08 PROCEDURE — 99999 PR PBB SHADOW E&M-EST. PATIENT-LVL IV: CPT | Mod: PBBFAC,,, | Performed by: SURGERY

## 2019-05-08 PROCEDURE — 99205 OFFICE O/P NEW HI 60 MIN: CPT | Mod: S$PBB,,, | Performed by: SURGERY

## 2019-05-08 PROCEDURE — 99205 PR OFFICE/OUTPT VISIT, NEW, LEVL V, 60-74 MIN: ICD-10-PCS | Mod: S$PBB,,, | Performed by: SURGERY

## 2019-05-08 PROCEDURE — 80053 COMPREHEN METABOLIC PANEL: CPT

## 2019-05-08 NOTE — LETTER
May 14, 2019      Prasanna Adames PA-C  [address]  [place] [address]           [facility]  [address] [facility] [address]  [facility] [address]  Phone: [phone]  Fax: [phone]          Patient: Paul Li Jr.   MR Number: [id number]   YOB: 1975   Date of Visit: 5/8/2019       Dear Prasanna Adames:    Thank you for referring Paul Li to me for evaluation. Attached you will find relevant portions of my assessment and plan of care.    If you have questions, please do not hesitate to call me. I look forward to following Paul Li along with you.    Sincerely,    Shima Whyte MD    Enclosure  CC:  No Recipients    If you would like to receive this communication electronically, please contact [email or web address] or [phone] to request more information on [employer] access.    For providers and/or their staff who would like to refer a patient to [facility], please contact us through our one-stop-shop provider referral line, [email or web address], at [phone].    If you feel you have received this communication in error or would no longer like to receive these types of communications, please e-mail [email or web address]

## 2019-05-08 NOTE — H&P (VIEW-ONLY)
New Breast Cancer  History and Physical  [facility]  Department of Surgery    REFERRING PROVIDER: Prasanna Adames PA-C  [address]  [place] [id number]    CHIEF COMPLAINT: right breast cancer    Subjective:      Paul Li Jr. is a 43 y.o.  male referred for evaluation of recently diagnosed carcinoma of the right breast. The patient was initially referred for surgical evaluation of a breast lump on exam first noted 1 month ago. Follow-up imaging showed mass at 12 o'clock retroareolar in the right breast. A ultrasound guided biopsy was performed on 5/2/2019 with pathology revealing infiltrating ductal carcinoma of the breast.     Patient does routinely do self breast exams.  Patient has noted a change on breast exam.  Patient denies nipple discharge. Patient denies to previous breast biopsy. Patient denies a personal history of breast cancer.    Findings at that time were the following:   Tumor size: 1.4 cm with nipple inversion  Tumor stgstrstastdstest:st st1st Estrogen Receptor: pending   Progesterone Receptor: pending   Her-2 gerardo: 0, negative  Lymph node status: clinically negative     4 children, all sons.    FAMILY History:    Paternal GM breast cancer, 70s  Maternal GM cancer, GI cancer.  H/o ovarian mass (unsure if cancer)  Maternal GF throat cancer        Past Medical History:   Diagnosis Date    HTN (hypertension)     Leg edema, right     Prediabetes      No past surgical history on file.  Current Outpatient Medications on File Prior to Visit   Medication Sig Dispense Refill    amLODIPine (NORVASC) 10 MG tablet Take 1 tablet (10 mg total) by mouth once daily. 30 tablet 11    losartan-hydrochlorothiazide 100-25 mg (HYZAAR) 100-25 mg per tablet Take 1 tablet by mouth once daily. 90 tablet 3    colchicine 0.6 mg tablet Take 1 tablet (0.6 mg total) by mouth 2 (two) times daily as needed. 9 tablet 11     No current facility-administered medications on file prior to visit.      Social History  "    Socioeconomic History    Marital status:      Spouse name: Not on file    Number of children: 4    Years of education: Not on file    Highest education level: Not on file   Occupational History    Occupation:    Social Needs    Financial resource strain: Not very hard    Food insecurity:     Worry: Never true     Inability: Never true    Transportation needs:     Medical: Yes     Non-medical: No   Tobacco Use    Smoking status: Former Smoker     Packs/day: 0.25     Years: 15.00     Pack years: 3.75     Types: Cigarettes     Last attempt to quit: 2013     Years since quittin.4    Smokeless tobacco: Former User     Quit date: 2013    Tobacco comment: e cigs   Substance and Sexual Activity    Alcohol use: Not on file    Drug use: Not on file    Sexual activity: Not on file   Lifestyle    Physical activity:     Days per week: 0 days     Minutes per session: Not on file    Stress: Only a little   Relationships    Social connections:     Talks on phone: More than three times a week     Gets together: Patient refused     Attends Religion service: Not on file     Active member of club or organization: No     Attends meetings of clubs or organizations: Never     Relationship status:    Other Topics Concern    Not on file   Social History Narrative    From [place]     Family History   Problem Relation Age of Onset    Hypertension Mother     Diabetes Mother     Hypertension Father     Colon cancer Maternal Grandfather     Breast cancer Paternal Grandmother         Review of Systems  Negative except above.     Objective:   PHYSICAL EXAM:  BP (!) 145/80 (BP Location: Left arm, Patient Position: Sitting, BP Method: X-Large (Automatic))   Pulse 67   Ht 6' 1" (1.854 m)   Wt (!) 193.5 kg (426 lb 9.4 oz)   BMI 56.28 kg/m²     Physical Exam   Constitutional: He is oriented to person, place, and time. He appears well-developed.   HENT:   Head: Normocephalic. "   Pulmonary/Chest: Effort normal. Right breast exhibits inverted nipple and mass. Right breast exhibits no nipple discharge, no skin change and no tenderness. Left breast exhibits no inverted nipple, no mass, no nipple discharge, no skin change and no tenderness.   Abdominal: Soft.   Musculoskeletal: He exhibits no edema.   Lymphadenopathy:     He has cervical adenopathy.   Neurological: He is alert and oriented to person, place, and time.   Psychiatric: He has a normal mood and affect.         Radiology review: Images personally reviewed by me in the clinic.       Assessment:      Paul Li Jr. is a 43 y.o.  male with recently diagnosed carcinoma of the right breast.      Plan:    Options for management were discussed with the patient and his family. We reviewed the existing data noting the equivalency of breast conserving surgery with radiation therapy and mastectomy. We also reviewed the guidelines of the National Comprehensive Cancer Network for Stage 1 breast carcinoma. We discussed the need for lumpectomy margins to be negative for carcinoma, the necessity for postoperative radiation therapy after breast conservation in most cases, the possibility of a failed or false negative sentinel lymph node biopsy and the potential need for complete lymphadenectomy for a failed or positive sentinel lymph node biopsy were fully discussed. In the setting of mastectomy, delayed or immediate reconstruction options are available and were discussed.     In the setting of lumpectomy, radiation therapy would be recommended majority of the time.  The duration and treatment side effects were discussed with the patient.  This will coordinated with the radiation oncologist pending final pathology.    We also discussed the role of systemic therapy in the treatment of early stage breast cancer.  We discussed that this is based on tumor biology and derick status and will be determined based on final pathology.  We discussed that if  the cancer is hormone positive, endocrine therapy would be recommended in most cases and its use can reduce the risk of recurrence as well as improve survival. Side effects of treatment were briefly discussed. We also discussed the potential role for chemotherapy based on a number of factors such as tumor phenotype (ER+ vs. triple negative vs. Sps0iyf+) and this would be determined in coordination with the medical oncologist.      Patient was educated on breast cancer, receptors, wire localization lumpectomy, mastectomy, sentinel lymph node mapping and biopsy, axillary lymph node dissection, reconstruction, breast prosthesis with post-mastectomy bra and radiation therapy. Patient was given patient information binder including [facility] breast cancer treatment brochure.  All her questions were answered.    Total time spent with the patient: 60 minutes.  45 minutes of face to face consultation and 15 minutes of chart review and coordination of care.    Plan to proceed with RIGHT mastectomy and SLNB.    Genetic testing  F/u receptors.

## 2019-05-08 NOTE — TELEPHONE ENCOUNTER
----- Message from Tess Heredia RN sent at 5/8/2019  1:35 PM CDT -----  Regarding: patient needs to cancel surgery   Good afternoon,  Dr. Whyte just saw Mr. Li for new dx breast cancer.  He will be having a mastectomy on 5-24-19.  He will need his procedure with Dr. Burnette rescheduled at this time.  Patient requesting an urgent appointment with MD regarding need for drain.  Please call patient to schedule appointment.  Thank you.    Tess Heredia RN

## 2019-05-08 NOTE — TELEPHONE ENCOUNTER
SPoke to pt and cancelled surgery and booked him an office visit for next week to have hydrocele drained. Pt verbalized understanding.

## 2019-05-08 NOTE — PROGRESS NOTES
New Breast Cancer  History and Physical  [facility]  Department of Surgery    REFERRING PROVIDER: Prasanna Adames PA-C  [address]  [place] [id number]    CHIEF COMPLAINT: right breast cancer    Subjective:      Paul Li Jr. is a 43 y.o.  male referred for evaluation of recently diagnosed carcinoma of the right breast. The patient was initially referred for surgical evaluation of a breast lump on exam first noted 1 month ago. Follow-up imaging showed mass at 12 o'clock retroareolar in the right breast. A ultrasound guided biopsy was performed on 5/2/2019 with pathology revealing infiltrating ductal carcinoma of the breast.     Patient does routinely do self breast exams.  Patient has noted a change on breast exam.  Patient denies nipple discharge. Patient denies to previous breast biopsy. Patient denies a personal history of breast cancer.    Findings at that time were the following:   Tumor size: 1.4 cm with nipple inversion  Tumor stgstrstastdstest:st st1st Estrogen Receptor: pending   Progesterone Receptor: pending   Her-2 gerardo: 0, negative  Lymph node status: clinically negative     4 children, all sons.    FAMILY History:    Paternal GM breast cancer, 70s  Maternal GM cancer, GI cancer.  H/o ovarian mass (unsure if cancer)  Maternal GF throat cancer        Past Medical History:   Diagnosis Date    HTN (hypertension)     Leg edema, right     Prediabetes      No past surgical history on file.  Current Outpatient Medications on File Prior to Visit   Medication Sig Dispense Refill    amLODIPine (NORVASC) 10 MG tablet Take 1 tablet (10 mg total) by mouth once daily. 30 tablet 11    losartan-hydrochlorothiazide 100-25 mg (HYZAAR) 100-25 mg per tablet Take 1 tablet by mouth once daily. 90 tablet 3    colchicine 0.6 mg tablet Take 1 tablet (0.6 mg total) by mouth 2 (two) times daily as needed. 9 tablet 11     No current facility-administered medications on file prior to visit.      Social History  "    Socioeconomic History    Marital status:      Spouse name: Not on file    Number of children: 4    Years of education: Not on file    Highest education level: Not on file   Occupational History    Occupation:    Social Needs    Financial resource strain: Not very hard    Food insecurity:     Worry: Never true     Inability: Never true    Transportation needs:     Medical: Yes     Non-medical: No   Tobacco Use    Smoking status: Former Smoker     Packs/day: 0.25     Years: 15.00     Pack years: 3.75     Types: Cigarettes     Last attempt to quit: 2013     Years since quittin.4    Smokeless tobacco: Former User     Quit date: 2013    Tobacco comment: e cigs   Substance and Sexual Activity    Alcohol use: Not on file    Drug use: Not on file    Sexual activity: Not on file   Lifestyle    Physical activity:     Days per week: 0 days     Minutes per session: Not on file    Stress: Only a little   Relationships    Social connections:     Talks on phone: More than three times a week     Gets together: Patient refused     Attends Sikhism service: Not on file     Active member of club or organization: No     Attends meetings of clubs or organizations: Never     Relationship status:    Other Topics Concern    Not on file   Social History Narrative    From [place]     Family History   Problem Relation Age of Onset    Hypertension Mother     Diabetes Mother     Hypertension Father     Colon cancer Maternal Grandfather     Breast cancer Paternal Grandmother         Review of Systems  Negative except above.     Objective:   PHYSICAL EXAM:  BP (!) 145/80 (BP Location: Left arm, Patient Position: Sitting, BP Method: X-Large (Automatic))   Pulse 67   Ht 6' 1" (1.854 m)   Wt (!) 193.5 kg (426 lb 9.4 oz)   BMI 56.28 kg/m²     Physical Exam   Constitutional: He is oriented to person, place, and time. He appears well-developed.   HENT:   Head: Normocephalic. "   Pulmonary/Chest: Effort normal. Right breast exhibits inverted nipple and mass. Right breast exhibits no nipple discharge, no skin change and no tenderness. Left breast exhibits no inverted nipple, no mass, no nipple discharge, no skin change and no tenderness.   Abdominal: Soft.   Musculoskeletal: He exhibits no edema.   Lymphadenopathy:     He has cervical adenopathy.   Neurological: He is alert and oriented to person, place, and time.   Psychiatric: He has a normal mood and affect.         Radiology review: Images personally reviewed by me in the clinic.       Assessment:      Paul Li Jr. is a 43 y.o.  male with recently diagnosed carcinoma of the right breast.      Plan:    Options for management were discussed with the patient and his family. We reviewed the existing data noting the equivalency of breast conserving surgery with radiation therapy and mastectomy. We also reviewed the guidelines of the National Comprehensive Cancer Network for Stage 1 breast carcinoma. We discussed the need for lumpectomy margins to be negative for carcinoma, the necessity for postoperative radiation therapy after breast conservation in most cases, the possibility of a failed or false negative sentinel lymph node biopsy and the potential need for complete lymphadenectomy for a failed or positive sentinel lymph node biopsy were fully discussed. In the setting of mastectomy, delayed or immediate reconstruction options are available and were discussed.     In the setting of lumpectomy, radiation therapy would be recommended majority of the time.  The duration and treatment side effects were discussed with the patient.  This will coordinated with the radiation oncologist pending final pathology.    We also discussed the role of systemic therapy in the treatment of early stage breast cancer.  We discussed that this is based on tumor biology and derick status and will be determined based on final pathology.  We discussed that if  the cancer is hormone positive, endocrine therapy would be recommended in most cases and its use can reduce the risk of recurrence as well as improve survival. Side effects of treatment were briefly discussed. We also discussed the potential role for chemotherapy based on a number of factors such as tumor phenotype (ER+ vs. triple negative vs. Pei1otv+) and this would be determined in coordination with the medical oncologist.      Patient was educated on breast cancer, receptors, wire localization lumpectomy, mastectomy, sentinel lymph node mapping and biopsy, axillary lymph node dissection, reconstruction, breast prosthesis with post-mastectomy bra and radiation therapy. Patient was given patient information binder including [facility] breast cancer treatment brochure.  All her questions were answered.    Total time spent with the patient: 60 minutes.  45 minutes of face to face consultation and 15 minutes of chart review and coordination of care.    Plan to proceed with RIGHT mastectomy and SLNB.    Genetic testing  F/u receptors.

## 2019-05-10 DIAGNOSIS — C50.921 MALIGNANT NEOPLASM OF RIGHT MALE BREAST, UNSPECIFIED ESTROGEN RECEPTOR STATUS, UNSPECIFIED SITE OF BREAST: Primary | ICD-10-CM

## 2019-05-13 ENCOUNTER — PATIENT MESSAGE (OUTPATIENT)
Dept: SURGERY | Facility: HOSPITAL | Age: 44
End: 2019-05-13

## 2019-05-15 ENCOUNTER — OFFICE VISIT (OUTPATIENT)
Dept: UROLOGY | Facility: CLINIC | Age: 44
End: 2019-05-15
Payer: MEDICAID

## 2019-05-15 VITALS
DIASTOLIC BLOOD PRESSURE: 84 MMHG | BODY MASS INDEX: 41.75 KG/M2 | HEIGHT: 73 IN | WEIGHT: 315 LBS | SYSTOLIC BLOOD PRESSURE: 133 MMHG | HEART RATE: 66 BPM

## 2019-05-15 DIAGNOSIS — N43.3 HYDROCELE IN ADULT: Primary | ICD-10-CM

## 2019-05-15 PROCEDURE — 55000 DRAINAGE OF HYDROCELE: CPT | Mod: PBBFAC,PO | Performed by: UROLOGY

## 2019-05-15 PROCEDURE — 99999 PR PBB SHADOW E&M-EST. PATIENT-LVL III: CPT | Mod: PBBFAC,,, | Performed by: UROLOGY

## 2019-05-15 PROCEDURE — 55000 DRAINAGE OF HYDROCELE: CPT | Mod: S$PBB,RT,, | Performed by: UROLOGY

## 2019-05-15 PROCEDURE — 55000 PR DRAINAGE OF HYDROCELE,TUNICA: ICD-10-PCS | Mod: S$PBB,RT,, | Performed by: UROLOGY

## 2019-05-15 PROCEDURE — 99213 OFFICE O/P EST LOW 20 MIN: CPT | Mod: 25,S$PBB,, | Performed by: UROLOGY

## 2019-05-15 PROCEDURE — 99213 PR OFFICE/OUTPT VISIT, EST, LEVL III, 20-29 MIN: ICD-10-PCS | Mod: 25,S$PBB,, | Performed by: UROLOGY

## 2019-05-15 PROCEDURE — 99999 PR PBB SHADOW E&M-EST. PATIENT-LVL III: ICD-10-PCS | Mod: PBBFAC,,, | Performed by: UROLOGY

## 2019-05-15 PROCEDURE — 99213 OFFICE O/P EST LOW 20 MIN: CPT | Mod: PBBFAC,PO,25 | Performed by: UROLOGY

## 2019-05-17 PROBLEM — Z17.1: Status: ACTIVE | Noted: 2019-05-17

## 2019-05-17 PROBLEM — D50.9 IRON DEFICIENCY ANEMIA: Status: ACTIVE | Noted: 2019-05-17

## 2019-05-17 PROBLEM — C50.021: Status: ACTIVE | Noted: 2019-05-17

## 2019-05-17 NOTE — PROGRESS NOTES
History:     Reason For Consultation:   1. Stage IB ([id number]) triple negative invasive ductal carcinoma with lobular features of right breast, retroareolar, Grade 2, Ki67 80%, diagnosed 5/2019    Referring Provider:   Shima Whyte MD  [address] [place] [address]    Records Obtained: Records of the patients history including those obtained from the referring provider were reviewed and summarized in detail.    HPI:   Paul Li Jr. presents for consultation breast cancer. He presented for evaluation of right breast lump which he noticed for one month. Breast imaging showed a mass at 12:00 retroareolar position, confirmed by US as 1.4 cm. Biopsy revealed grade 2 invasive ductal carcinoma with lobular features, triple negative with Ki67 80%. Since his markers have returned as triple negative, he's been referred to us prior to his surgery for consideration of neoadjuvant chemotherapy. He is a , , with grown children and one grandson. Unfortunately he and his wife suffered a still birth just a few months ago. He works a good bit - long days - from 3 am often until 5-6 at night. He is an . No bony pain.     Past Medical   Past Medical History:   Diagnosis Date    HTN (hypertension)     Leg edema, right     Prediabetes      Patient Active Problem List   Diagnosis    Essential hypertension    Gout    Malignant neoplasm involving both nipple and areola of right breast in male, estrogen receptor negative    Microcytic anemia    Morbid obesity with BMI of 50.0-59.9, adult    Adjustment disorder, unspecified     Social History   Social History     Socioeconomic History    Marital status:      Spouse name: Not on file    Number of children: 4    Years of education: Not on file    Highest education level: Not on file   Occupational History    Occupation:    Social Needs    Financial resource  strain: Not very hard    Food insecurity:     Worry: Never true     Inability: Never true    Transportation needs:     Medical: Yes     Non-medical: No   Tobacco Use    Smoking status: Former Smoker     Packs/day: 0.25     Years: 15.00     Pack years: 3.75     Types: Cigarettes     Last attempt to quit: 2013     Years since quittin.4    Smokeless tobacco: Never Used    Tobacco comment: Social smoker with alcohol    Substance and Sexual Activity    Alcohol use: Yes     Alcohol/week: 0.0 oz     Frequency: Monthly or less     Drinks per session: 1 or 2     Binge frequency: Never     Comment: Rarely    Drug use: Never    Sexual activity: Yes     Partners: Female     Birth control/protection: None   Lifestyle    Physical activity:     Days per week: 0 days     Minutes per session: Not on file    Stress: Only a little   Relationships    Social connections:     Talks on phone: More than three times a week     Gets together: Patient refused     Attends [employer] service: Not on file     Active member of club or organization: No     Attends meetings of clubs or organizations: Never     Relationship status:    Other Topics Concern    Not on file   Social History Narrative    From [place]     Family History  Family History   Problem Relation Age of Onset    Hypertension Mother     Diabetes Mother     Hypertension Father     Lupus Father     No Known Problems Brother     No Known Problems Maternal Grandmother     Colon cancer Maternal Grandfather     Breast cancer Paternal Grandmother     No Known Problems Paternal Grandfather     No Known Problems Sister     No Known Problems Maternal Aunt     No Known Problems Maternal Uncle     Fibromyalgia Paternal Aunt     Lupus Paternal Aunt     No Known Problems Paternal Uncle     No Known Problems Brother     No Known Problems Sister     No Known Problems Son     No Known Problems Son     No Known Problems Son     No Known Problems  Son      Medications    Current Outpatient Medications:     amLODIPine (NORVASC) 10 MG tablet, Take 1 tablet (10 mg total) by mouth once daily., Disp: 30 tablet, Rfl: 11    colchicine 0.6 mg tablet, Take 1 tablet (0.6 mg total) by mouth 2 (two) times daily as needed., Disp: 9 tablet, Rfl: 11    losartan-hydrochlorothiazide 100-25 mg (HYZAAR) 100-25 mg per tablet, Take 1 tablet by mouth once daily., Disp: 90 tablet, Rfl: 3    dexamethasone (DECADRON) 4 MG Tab, Take one tablet twice a day days 2,3, and 4 of each chemotherapy cycle., Disp: 12 tablet, Rfl: 1    lidocaine-prilocaine (EMLA) cream, Apply to affected area 45 minutes before port access, Disp: 30 g, Rfl: 1    ondansetron (ZOFRAN-ODT) 8 MG TbDL, Take 1 tablet (8 mg total) by mouth every 12 (twelve) hours as needed., Disp: 20 tablet, Rfl: 1  Allergies  Review of patient's allergies indicates:  No Known Allergies  Review of Systems  Review of Systems   Constitutional: Negative for activity change, appetite change, chills, fatigue, fever and unexpected weight change.   HENT: Negative for congestion, hearing loss, nosebleeds, sinus pressure and trouble swallowing.    Eyes: Negative for pain, discharge, itching and visual disturbance.   Respiratory: Negative for cough, chest tightness and shortness of breath.    Cardiovascular: Negative for chest pain, palpitations and leg swelling.   Gastrointestinal: Negative for abdominal distention, abdominal pain, blood in stool, diarrhea, nausea, rectal pain and vomiting.   Endocrine: Negative for heat intolerance and polydipsia.   Genitourinary: Negative for difficulty urinating, dysuria, flank pain, frequency, hematuria and urgency.   Musculoskeletal: Negative for arthralgias, back pain and neck pain.   Skin: Negative for color change, pallor and rash.   Neurological: Negative for dizziness, numbness and headaches.   Hematological: Negative for adenopathy. Does not bruise/bleed easily.   Psychiatric/Behavioral:  "Negative for confusion and decreased concentration. The patient is nervous/anxious.         Objective     Objective:     Vitals:    05/20/19 1141   BP: (!) 176/92   BP Location: Right arm   Patient Position: Sitting   Pulse: (!) 59   Resp: 20   Temp: 98.2 °F (36.8 °C)   TempSrc: Oral   Weight: (!) 188.3 kg (415 lb 2 oz)   Height: 6' 1" (1.854 m)   Body mass index is 54.77 kg/m².    Body surface area is 3.11 meters squared.  GENERAL:  Well-developed, obese male in no acute distress. Vital signs reviewed. Accompanied by wife.  SKIN:  Warm, dry without rashes, purpura or petechiae. See breast exam.  EYES:  Pupils equal, round and reactive to light.  EOMs intact.  Conjunctivae normal.  HEAD:  Normocephalic.  EARS/NOSE/MOUTH/THROAT:  Hearing intact. Septum midline.  No excoriations or nasal discharge. No stomatitis or ulcers.  Lips normal. Oropharynx without lesions or exudates.  NECK:  Supple with good range of motion; no thyromegaly or masses  LYMPHATICS:  No cervical, supraclavicular, axillary adenopathy.  RESP:  Lungs clear to percussion and auscultation. Good airflow. Normal respiratory effort.   CARDIAC:  Regular rate and rhythm without murmurs, rubs or gallops. Normal S1,S2. No edema  GI:  Soft, nontender, no hepatosplenomegaly, normal bowel sounds  MSK:  No clubbing or cyanosis, No joint swelling noted in hands  NEUROLOGICAL:  Cranial Nerves II-XII grossly intact.  No focal neurological deficits.  PSYCHIATRIC:  Normal affect and mood.  Alert and Oriented x 3.   BREASTS- Right breast with nipple retraction and skin thickening around nipple without well defined mass behind the skin thickening.    Labs and Imaging  Results for orders placed or performed in visit on 05/08/19   CBC auto differential   Result Value Ref Range    WBC 6.18 3.90 - 12.70 K/uL    RBC 5.09 4.60 - 6.20 M/uL    Hemoglobin 12.7 (L) 14.0 - 18.0 g/dL    Hematocrit 40.5 40.0 - 54.0 %    Mean Corpuscular Volume 80 (L) 82 - 98 fL    Mean Corpuscular " Hemoglobin 25.0 (L) 27.0 - 31.0 pg    Mean Corpuscular Hemoglobin Conc 31.4 (L) 32.0 - 36.0 g/dL    RDW 14.7 (H) 11.5 - 14.5 %    Platelets 233 150 - 350 K/uL    MPV 11.7 9.2 - 12.9 fL    Immature Granulocytes 0.2 0.0 - 0.5 %    Gran # (ANC) 2.7 1.8 - 7.7 K/uL    Immature Grans (Abs) 0.01 0.00 - 0.04 K/uL    Lymph # 2.9 1.0 - 4.8 K/uL    Mono # 0.5 0.3 - 1.0 K/uL    Eos # 0.1 0.0 - 0.5 K/uL    Baso # 0.01 0.00 - 0.20 K/uL    nRBC 0 0 /100 WBC    Gran% 42.8 38.0 - 73.0 %    Lymph% 47.1 18.0 - 48.0 %    Mono% 8.6 4.0 - 15.0 %    Eosinophil% 1.1 0.0 - 8.0 %    Basophil% 0.2 0.0 - 1.9 %    Differential Method Automated    Comprehensive metabolic panel   Result Value Ref Range    Sodium 138 136 - 145 mmol/L    Potassium 3.9 3.5 - 5.1 mmol/L    Chloride 103 95 - 110 mmol/L    CO2 26 23 - 29 mmol/L    Glucose 87 70 - 110 mg/dL    BUN, Bld 15 6 - 20 mg/dL    Creatinine 1.3 0.5 - 1.4 mg/dL    Calcium 10.0 8.7 - 10.5 mg/dL    Total Protein 8.1 6.0 - 8.4 g/dL    Albumin 4.1 3.5 - 5.2 g/dL    Total Bilirubin 0.8 0.1 - 1.0 mg/dL    Alkaline Phosphatase 77 55 - 135 U/L    AST 25 10 - 40 U/L    ALT 50 (H) 10 - 44 U/L    Anion Gap 9 8 - 16 mmol/L    eGFR if African American >60.0 >60 mL/min/1.73 m^2    eGFR if non African American >60.0 >60 mL/min/1.73 m^2       Breast pathology and imaging as above.     Assessment     Assessment:     1. Stage IB ([id number]) invasive ductal carcinoma with lobular features of right breast, retroareolar, ER neg, CO neg, Her2 neg, Grade 2, Ki67 80%, diagnosed 5/2019   * We discussed the prognosis for her breast cancer, particularly in terms of risks of local and distant recurrences. We reviewed treatment recommendations as detailed below.    * He's a good candidate for neoadjuvant chemotherapy with four cycles of dose-dense Adriamycin and cyclophosphamide with Neulasta support followed by twelve doses of weekly paclitaxel. We reviewed the risks, benefits and significant side effects of chemotherapy,  including but not limited to cytopenias, nausea/emesis, peripheral neuropathy, cardiotoxicity, arthralgias, alopecia and rarely secondary malignancies. Patient asked multiple appropriate questions. Consent signed.     Problem List Items Addressed This Visit        Psychiatric    Adjustment disorder, unspecified    Overview     Secondary to new breast cancer diagnosis.             Cardiac/Vascular    Essential hypertension    Overview     - Managed by PCP            Oncology    Malignant neoplasm involving both nipple and areola of right breast in male, estrogen receptor negative - Primary    Overview     1. Stage IB ([id number]) invasive ductal carcinoma with lobular features of right breast, retroareolar, ER neg, ND neg, Her2 neg, Grade 2, Ki67 80%, diagnosed 5/2019         Current Assessment & Plan     - Planning neoadjuvant ddAC with Neulasta followed by Taxol.          Relevant Medications    dexamethasone (DECADRON) 4 MG Tab    ondansetron (ZOFRAN-ODT) 8 MG TbDL    lidocaine-prilocaine (EMLA) cream    Other Relevant Orders    Transthoracic echo (TTE) 2D with Color Flow (Completed)    Ambulatory Referral to Hematology/Oncology/Psychology    Ambulatory Referral to Oncology Social Work    CBC Oncology    Comprehensive metabolic panel    Microcytic anemia    Overview     - Microcytic anemia present since atleast 2017 without significant change. Mild iron deficiency.   - Will test with hemoglobin electrophoresis in future.            Endocrine    Morbid obesity with BMI of 50.0-59.9, adult    Overview     - Discussed weight loss                 Plan:     · Start neoadjuvant chemotherapy with ddAC followed by weekly Taxol  · Dr Whyte for port  · Echo  · Chemo class  · Referral for Dr Nunez and Marilin Davey. He is  for driving.     Follow-up in 1.5 weeks. I asked the patient to call for any questions, concerns, or new symptoms.    Distress Screening Results: Psychosocial Distress screening  score of Distress Score: 5 noted and reviewed. A referral to Both Oncology Social Worker initiated. and onc psychology.   I spent 65 minutes with patient and family with 55 spent face to face counseling on diagnosis, goals of therapy, treatment options.

## 2019-05-20 ENCOUNTER — OFFICE VISIT (OUTPATIENT)
Dept: HEMATOLOGY/ONCOLOGY | Facility: CLINIC | Age: 44
End: 2019-05-20
Payer: MEDICAID

## 2019-05-20 ENCOUNTER — HOSPITAL ENCOUNTER (OUTPATIENT)
Dept: CARDIOLOGY | Facility: CLINIC | Age: 44
Discharge: HOME | End: 2019-05-20
Attending: INTERNAL MEDICINE
Payer: MEDICAID

## 2019-05-20 VITALS
DIASTOLIC BLOOD PRESSURE: 92 MMHG | TEMPERATURE: 98 F | RESPIRATION RATE: 20 BRPM | BODY MASS INDEX: 41.75 KG/M2 | SYSTOLIC BLOOD PRESSURE: 176 MMHG | WEIGHT: 315 LBS | HEART RATE: 59 BPM | HEIGHT: 73 IN

## 2019-05-20 VITALS
WEIGHT: 315 LBS | BODY MASS INDEX: 41.75 KG/M2 | HEIGHT: 73 IN | DIASTOLIC BLOOD PRESSURE: 92 MMHG | SYSTOLIC BLOOD PRESSURE: 176 MMHG

## 2019-05-20 DIAGNOSIS — C50.021 MALIGNANT NEOPLASM INVOLVING BOTH NIPPLE AND AREOLA OF RIGHT BREAST IN MALE, ESTROGEN RECEPTOR NEGATIVE: ICD-10-CM

## 2019-05-20 DIAGNOSIS — I10 ESSENTIAL HYPERTENSION: ICD-10-CM

## 2019-05-20 DIAGNOSIS — Z17.1 MALIGNANT NEOPLASM INVOLVING BOTH NIPPLE AND AREOLA OF RIGHT BREAST IN MALE, ESTROGEN RECEPTOR NEGATIVE: Primary | ICD-10-CM

## 2019-05-20 DIAGNOSIS — F43.20 ADJUSTMENT DISORDER, UNSPECIFIED TYPE: ICD-10-CM

## 2019-05-20 DIAGNOSIS — E66.01 MORBID OBESITY WITH BMI OF 50.0-59.9, ADULT: ICD-10-CM

## 2019-05-20 DIAGNOSIS — Z17.1 MALIGNANT NEOPLASM INVOLVING BOTH NIPPLE AND AREOLA OF RIGHT BREAST IN MALE, ESTROGEN RECEPTOR NEGATIVE: ICD-10-CM

## 2019-05-20 DIAGNOSIS — C50.021 MALIGNANT NEOPLASM INVOLVING BOTH NIPPLE AND AREOLA OF RIGHT BREAST IN MALE, ESTROGEN RECEPTOR NEGATIVE: Primary | ICD-10-CM

## 2019-05-20 DIAGNOSIS — D50.9 MICROCYTIC ANEMIA: ICD-10-CM

## 2019-05-20 LAB
ASCENDING AORTA: 3.37 CM
AV INDEX (PROSTH): 1.04
AV MEAN GRADIENT: 5.02 MMHG
AV PEAK GRADIENT: 10.63 MMHG
AV VALVE AREA: 4.05 CM2
AV VELOCITY RATIO: 0.8
BSA FOR ECHO PROCEDURE: 3.11 M2
CV ECHO LV RWT: 0.41 CM
DOP CALC AO PEAK VEL: 1.63 M/S
DOP CALC AO VTI: 30.3 CM
DOP CALC LVOT AREA: 3.9 CM2
DOP CALC LVOT DIAMETER: 2.23 CM
DOP CALC LVOT PEAK VEL: 1.3 M/S
DOP CALC LVOT STROKE VOLUME: 122.81 CM3
DOP CALCLVOT PEAK VEL VTI: 31.46 CM
E WAVE DECELERATION TIME: 179.92 MSEC
E/A RATIO: 1.19
ECHO LV POSTERIOR WALL: 0.97 CM (ref 0.6–1.1)
FRACTIONAL SHORTENING: 42 % (ref 28–44)
INTERVENTRICULAR SEPTUM: 1.12 CM (ref 0.6–1.1)
IVRT: 0.07 MSEC
LA MAJOR: 6.24 CM
LA MINOR: 6.38 CM
LA WIDTH: 4.71 CM
LEFT ATRIUM SIZE: 4.54 CM
LEFT ATRIUM VOLUME INDEX: 39.1 ML/M2
LEFT ATRIUM VOLUME: 114.68 CM3
LEFT INTERNAL DIMENSION IN SYSTOLE: 2.75 CM (ref 2.1–4)
LEFT VENTRICLE DIASTOLIC VOLUME INDEX: 35.21 ML/M2
LEFT VENTRICLE DIASTOLIC VOLUME: 103.33 ML
LEFT VENTRICLE MASS INDEX: 59.9 G/M2
LEFT VENTRICLE SYSTOLIC VOLUME INDEX: 9.7 ML/M2
LEFT VENTRICLE SYSTOLIC VOLUME: 28.33 ML
LEFT VENTRICULAR INTERNAL DIMENSION IN DIASTOLE: 4.72 CM (ref 3.5–6)
LEFT VENTRICULAR MASS: 175.88 G
LV SEPTAL E/E' RATIO: 9.6
MV PEAK A VEL: 0.81 M/S
MV PEAK E VEL: 0.96 M/S
RA MAJOR: 5.77 CM
RA PRESSURE: 3 MMHG
RA WIDTH: 4.68 CM
RIGHT VENTRICULAR END-DIASTOLIC DIMENSION: 4.92 CM
RV TISSUE DOPPLER FREE WALL SYSTOLIC VELOCITY 1 (APICAL 4 CHAMBER VIEW): 15.82 M/S
SINUS: 3.89 CM
STJ: 3.16 CM
TDI SEPTAL: 0.1
TRICUSPID ANNULAR PLANE SYSTOLIC EXCURSION: 3.13 CM

## 2019-05-20 PROCEDURE — 99999 PR PBB SHADOW E&M-EST. PATIENT-LVL IV: CPT | Mod: PBBFAC,,, | Performed by: INTERNAL MEDICINE

## 2019-05-20 PROCEDURE — 93306 TTE W/DOPPLER COMPLETE: CPT | Mod: PBBFAC | Performed by: INTERNAL MEDICINE

## 2019-05-20 PROCEDURE — 99205 OFFICE O/P NEW HI 60 MIN: CPT | Mod: S$PBB,,, | Performed by: INTERNAL MEDICINE

## 2019-05-20 PROCEDURE — 99214 OFFICE O/P EST MOD 30 MIN: CPT | Mod: PBBFAC,25 | Performed by: INTERNAL MEDICINE

## 2019-05-20 PROCEDURE — 99205 PR OFFICE/OUTPT VISIT, NEW, LEVL V, 60-74 MIN: ICD-10-PCS | Mod: S$PBB,,, | Performed by: INTERNAL MEDICINE

## 2019-05-20 PROCEDURE — 93306 TRANSTHORACIC ECHO (TTE) COMPLETE (CUPID ONLY): ICD-10-PCS | Mod: 26,S$PBB,, | Performed by: INTERNAL MEDICINE

## 2019-05-20 PROCEDURE — 99999 PR PBB SHADOW E&M-EST. PATIENT-LVL IV: ICD-10-PCS | Mod: PBBFAC,,, | Performed by: INTERNAL MEDICINE

## 2019-05-20 RX ORDER — DEXAMETHASONE 4 MG/1
TABLET ORAL
Qty: 12 TABLET | Refills: 1 | Status: SHIPPED | OUTPATIENT
Start: 2019-05-20 | End: 2019-07-24

## 2019-05-20 RX ORDER — ONDANSETRON 8 MG/1
8 TABLET, ORALLY DISINTEGRATING ORAL EVERY 12 HOURS PRN
Qty: 20 TABLET | Refills: 1 | Status: ON HOLD | OUTPATIENT
Start: 2019-05-20 | End: 2020-12-24 | Stop reason: HOSPADM

## 2019-05-20 RX ORDER — LIDOCAINE AND PRILOCAINE 25; 25 MG/G; MG/G
CREAM TOPICAL
Qty: 30 G | Refills: 1 | Status: ON HOLD | OUTPATIENT
Start: 2019-05-20 | End: 2020-12-24 | Stop reason: HOSPADM

## 2019-05-20 NOTE — Clinical Note
1. Prior auth for ddAC with Neulasta - I'd like to start early next week2. Dr Whyte for port3. Echo - on main campus4. Chemo class - on main campus5. Referral to Dr Nunez and Marilin Davey6. MD on first day of first chemo with port labs - cbc, cmp

## 2019-05-20 NOTE — LETTER
May 20, 2019      Shima Whyte MD  [address] [facility] [address]           [facility] Hematology Oncology  [address]  [facility] [address]  Phone: [phone]          Patient: Paul Li Jr.   MR Number: [id number]   YOB: 1975   Date of Visit: 5/20/2019       Dear Dr. Shima Whyte:    Thank you for referring Paul Li to me for evaluation. Attached you will find relevant portions of my assessment and plan of care.    If you have questions, please do not hesitate to call me. I look forward to following Paul Li along with you.    Sincerely,    Richa Bahena MD    Enclosure  CC:  No Recipients    If you would like to receive this communication electronically, please contact [email or web address] or [phone] to request more information on iodine Link access.    For providers and/or their staff who would like to refer a patient to [facility], please contact us through our one-stop-shop provider referral line, [facility], at [phone].    If you feel you have received this communication in error or would no longer like to receive these types of communications, please e-mail [email or web address]

## 2019-05-20 NOTE — PLAN OF CARE
START ON PATHWAY REGIMEN - Breast    [id number]        Doxorubicin (Adriamycin(R))       Cyclophosphamide (Cytoxan(R))       [id number]     **Always confirm dose/schedule in your pharmacy ordering system**            Paclitaxel (Taxol(R))     **Always confirm dose/schedule in your pharmacy ordering system**        Patient Characteristics:  Preoperative or Nonsurgical Candidate (Clinical Staging), Neoadjuvant Therapy   followed by Surgery, Invasive Disease, Chemotherapy, HER2   Negative/Unknown/Equivocal, ER Negative/Unknown, Platinum Therapy Not Indicated  Therapeutic Status: Preoperative or Nonsurgical Candidate (Clinical Staging)  AJCC M Category: cM0  AJCC Grade: G2  Breast Surgical Plan: Neoadjuvant Therapy followed by Surgery  ER Status: Negative (-)  AJCC 8 Stage Grouping: IB  HER2 Status: Negative (-)  AJCC T Category: cT1c  AJCC N Category: cN0  PA Status: Negative (-)  Type of Therapy: Platinum Therapy Not Indicated  Intent of Therapy:  Curative Intent, Discussed with Patient

## 2019-05-21 ENCOUNTER — TELEPHONE (OUTPATIENT)
Dept: INFUSION THERAPY | Facility: HOSPITAL | Age: 44
End: 2019-05-21

## 2019-05-21 DIAGNOSIS — C50.921 MALIGNANT NEOPLASM OF RIGHT MALE BREAST, UNSPECIFIED ESTROGEN RECEPTOR STATUS, UNSPECIFIED SITE OF BREAST: Primary | ICD-10-CM

## 2019-05-21 NOTE — PROGRESS NOTES
Received consult from Dr. Bahena. Called patient at [phone] and left a voice mail message for him including my direct contact number. Will await his return call.

## 2019-05-22 ENCOUNTER — TELEPHONE (OUTPATIENT)
Dept: HEMATOLOGY/ONCOLOGY | Facility: CLINIC | Age: 44
End: 2019-05-22

## 2019-05-22 NOTE — TELEPHONE ENCOUNTER
Called patient to schedule follow up,lab,chemo & one on one. He scheduled the appointments for Monday 5/27.  Number was provided and locations for the appointments.          ----- Message from Sandy Nixon sent at 5/20/2019  2:00 PM CDT -----  Regarding: pending chemo 5/20.  MD Sandy Roth  1. Prior auth for ddAC with Neulasta - I'd like to start early next week     2. Dr Whyte for port ~~~ message sent    3. Echo - on main campus ~~~ 5/20    4. Chemo class - on main campus ~~~ 5/27    5. Referral to Dr Nunez and Marilin Davey ~~~ MESSAGE SENT    6. MD on first day of first chemo with port labs - cbc, cmp

## 2019-05-23 ENCOUNTER — TELEPHONE (OUTPATIENT)
Dept: SURGERY | Facility: CLINIC | Age: 44
End: 2019-05-23

## 2019-05-23 RX ORDER — IBUPROFEN 800 MG/1
800 TABLET ORAL 3 TIMES DAILY
Status: ON HOLD | COMMUNITY
End: 2019-10-10

## 2019-05-24 ENCOUNTER — ANESTHESIA EVENT (OUTPATIENT)
Dept: SURGERY | Facility: HOSPITAL | Age: 44
End: 2019-05-24
Payer: MEDICAID

## 2019-05-24 ENCOUNTER — ANESTHESIA (OUTPATIENT)
Dept: SURGERY | Facility: HOSPITAL | Age: 44
End: 2019-05-24
Payer: MEDICAID

## 2019-05-24 ENCOUNTER — HOSPITAL ENCOUNTER (OUTPATIENT)
Facility: HOSPITAL | Age: 44
Discharge: HOME | End: 2019-05-24
Attending: SURGERY | Admitting: SURGERY
Payer: MEDICAID

## 2019-05-24 ENCOUNTER — TELEPHONE (OUTPATIENT)
Dept: INFUSION THERAPY | Facility: HOSPITAL | Age: 44
End: 2019-05-24

## 2019-05-24 VITALS
RESPIRATION RATE: 18 BRPM | WEIGHT: 315 LBS | TEMPERATURE: 99 F | HEART RATE: 56 BPM | SYSTOLIC BLOOD PRESSURE: 133 MMHG | DIASTOLIC BLOOD PRESSURE: 84 MMHG | HEIGHT: 74 IN | OXYGEN SATURATION: 92 % | BODY MASS INDEX: 40.43 KG/M2

## 2019-05-24 DIAGNOSIS — C50.021 MALIGNANT NEOPLASM INVOLVING BOTH NIPPLE AND AREOLA OF RIGHT BREAST IN MALE, ESTROGEN RECEPTOR NEGATIVE: Primary | ICD-10-CM

## 2019-05-24 DIAGNOSIS — N43.3 HYDROCELE, UNSPECIFIED HYDROCELE TYPE: ICD-10-CM

## 2019-05-24 DIAGNOSIS — Z30.2 ENCOUNTER FOR STERILIZATION: ICD-10-CM

## 2019-05-24 DIAGNOSIS — N43.3 HYDROCELE: ICD-10-CM

## 2019-05-24 DIAGNOSIS — Z17.1 MALIGNANT NEOPLASM INVOLVING BOTH NIPPLE AND AREOLA OF RIGHT BREAST IN MALE, ESTROGEN RECEPTOR NEGATIVE: Primary | ICD-10-CM

## 2019-05-24 PROCEDURE — 36561 PR INSERT TUNNELED CV CATH WITH PORT: ICD-10-PCS | Mod: LT,,, | Performed by: SURGERY

## 2019-05-24 PROCEDURE — S0028 INJECTION, FAMOTIDINE, 20 MG: HCPCS | Performed by: NURSE ANESTHETIST, CERTIFIED REGISTERED

## 2019-05-24 PROCEDURE — 77001 FLUOROGUIDE FOR VEIN DEVICE: CPT | Mod: 26,,, | Performed by: SURGERY

## 2019-05-24 PROCEDURE — 77001 CHG FLUOROGUIDE CNTRL VEN ACCESS,PLACE,REPLACE,REMOVE: ICD-10-PCS | Mod: 26,,, | Performed by: SURGERY

## 2019-05-24 PROCEDURE — 71000016 HC POSTOP RECOV ADDL HR: Performed by: SURGERY

## 2019-05-24 PROCEDURE — D9220A PRA ANESTHESIA: Mod: CRNA,,, | Performed by: NURSE ANESTHETIST, CERTIFIED REGISTERED

## 2019-05-24 PROCEDURE — 37000009 HC ANESTHESIA EA ADD 15 MINS: Performed by: SURGERY

## 2019-05-24 PROCEDURE — 25000003 PHARM REV CODE 250: Performed by: NURSE ANESTHETIST, CERTIFIED REGISTERED

## 2019-05-24 PROCEDURE — 36000706: Performed by: SURGERY

## 2019-05-24 PROCEDURE — 71000015 HC POSTOP RECOV 1ST HR: Performed by: SURGERY

## 2019-05-24 PROCEDURE — 00532 ANES ACCESS CTR VENOUS CRCJ: CPT | Performed by: SURGERY

## 2019-05-24 PROCEDURE — 36000707: Performed by: SURGERY

## 2019-05-24 PROCEDURE — 63600175 PHARM REV CODE 636 W HCPCS: Performed by: NURSE ANESTHETIST, CERTIFIED REGISTERED

## 2019-05-24 PROCEDURE — 25000003 PHARM REV CODE 250: Performed by: STUDENT IN AN ORGANIZED HEALTH CARE EDUCATION/TRAINING PROGRAM

## 2019-05-24 PROCEDURE — 36561 INSERT TUNNELED CV CATH: CPT | Mod: LT,,, | Performed by: SURGERY

## 2019-05-24 PROCEDURE — 25000003 PHARM REV CODE 250: Performed by: SURGERY

## 2019-05-24 PROCEDURE — D9220A PRA ANESTHESIA: ICD-10-PCS | Mod: CRNA,,, | Performed by: NURSE ANESTHETIST, CERTIFIED REGISTERED

## 2019-05-24 PROCEDURE — 27000221 HC OXYGEN, UP TO 24 HOURS

## 2019-05-24 PROCEDURE — 63600175 PHARM REV CODE 636 W HCPCS: Performed by: SURGERY

## 2019-05-24 PROCEDURE — 94761 N-INVAS EAR/PLS OXIMETRY MLT: CPT | Mod: 59

## 2019-05-24 PROCEDURE — D9220A PRA ANESTHESIA: Mod: ANES,,, | Performed by: ANESTHESIOLOGY

## 2019-05-24 PROCEDURE — 37000008 HC ANESTHESIA 1ST 15 MINUTES: Performed by: SURGERY

## 2019-05-24 PROCEDURE — 71000033 HC RECOVERY, INTIAL HOUR: Performed by: SURGERY

## 2019-05-24 PROCEDURE — C1788 PORT, INDWELLING, IMP: HCPCS | Performed by: SURGERY

## 2019-05-24 PROCEDURE — D9220A PRA ANESTHESIA: ICD-10-PCS | Mod: ANES,,, | Performed by: ANESTHESIOLOGY

## 2019-05-24 DEVICE — KIT POWERPORT SINGLE 8FR: Type: IMPLANTABLE DEVICE | Site: CHEST | Status: FUNCTIONAL

## 2019-05-24 RX ORDER — SODIUM CHLORIDE 9 MG/ML
INJECTION, SOLUTION INTRAVENOUS CONTINUOUS
Status: DISCONTINUED | OUTPATIENT
Start: 2019-05-24 | End: 2019-05-24 | Stop reason: HOSPADM

## 2019-05-24 RX ORDER — LIDOCAINE HYDROCHLORIDE 10 MG/ML
INJECTION, SOLUTION EPIDURAL; INFILTRATION; INTRACAUDAL; PERINEURAL
Status: DISCONTINUED | OUTPATIENT
Start: 2019-05-24 | End: 2019-05-24 | Stop reason: HOSPADM

## 2019-05-24 RX ORDER — LIDOCAINE HCL/PF 100 MG/5ML
SYRINGE (ML) INTRAVENOUS
Status: DISCONTINUED | OUTPATIENT
Start: 2019-05-24 | End: 2019-05-24

## 2019-05-24 RX ORDER — METOCLOPRAMIDE HYDROCHLORIDE 5 MG/ML
10 INJECTION INTRAMUSCULAR; INTRAVENOUS EVERY 10 MIN PRN
Status: DISCONTINUED | OUTPATIENT
Start: 2019-05-24 | End: 2019-05-24 | Stop reason: HOSPADM

## 2019-05-24 RX ORDER — CEFAZOLIN SODIUM 1 G/3ML
2 INJECTION, POWDER, FOR SOLUTION INTRAMUSCULAR; INTRAVENOUS
Status: DISCONTINUED | OUTPATIENT
Start: 2019-05-24 | End: 2019-05-24 | Stop reason: HOSPADM

## 2019-05-24 RX ORDER — GLYCOPYRROLATE 0.2 MG/ML
INJECTION INTRAMUSCULAR; INTRAVENOUS
Status: DISCONTINUED | OUTPATIENT
Start: 2019-05-24 | End: 2019-05-24

## 2019-05-24 RX ORDER — HEPARIN SODIUM (PORCINE) LOCK FLUSH IV SOLN 100 UNIT/ML 100 UNIT/ML
SOLUTION INTRAVENOUS
Status: DISCONTINUED | OUTPATIENT
Start: 2019-05-24 | End: 2019-05-24 | Stop reason: HOSPADM

## 2019-05-24 RX ORDER — LIDOCAINE HYDROCHLORIDE 10 MG/ML
1 INJECTION, SOLUTION EPIDURAL; INFILTRATION; INTRACAUDAL; PERINEURAL ONCE
Status: DISCONTINUED | OUTPATIENT
Start: 2019-05-24 | End: 2019-05-24 | Stop reason: HOSPADM

## 2019-05-24 RX ORDER — OXYCODONE AND ACETAMINOPHEN 5; 325 MG/1; MG/1
1 TABLET ORAL EVERY 4 HOURS PRN
Status: DISCONTINUED | OUTPATIENT
Start: 2019-05-24 | End: 2019-05-24 | Stop reason: HOSPADM

## 2019-05-24 RX ORDER — FENTANYL CITRATE 50 UG/ML
INJECTION, SOLUTION INTRAMUSCULAR; INTRAVENOUS
Status: DISCONTINUED | OUTPATIENT
Start: 2019-05-24 | End: 2019-05-24

## 2019-05-24 RX ORDER — CEFAZOLIN SODIUM 1 G/3ML
INJECTION, POWDER, FOR SOLUTION INTRAMUSCULAR; INTRAVENOUS
Status: DISCONTINUED | OUTPATIENT
Start: 2019-05-24 | End: 2019-05-24

## 2019-05-24 RX ORDER — FAMOTIDINE 10 MG/ML
INJECTION INTRAVENOUS
Status: DISCONTINUED | OUTPATIENT
Start: 2019-05-24 | End: 2019-05-24

## 2019-05-24 RX ORDER — HYDROCODONE BITARTRATE AND ACETAMINOPHEN 5; 325 MG/1; MG/1
1 TABLET ORAL EVERY 6 HOURS PRN
Qty: 8 TABLET | Refills: 0 | Status: SHIPPED | OUTPATIENT
Start: 2019-05-24 | End: 2019-10-10

## 2019-05-24 RX ORDER — LORAZEPAM 2 MG/ML
0.25 INJECTION INTRAMUSCULAR ONCE AS NEEDED
Status: DISCONTINUED | OUTPATIENT
Start: 2019-05-24 | End: 2019-05-24 | Stop reason: HOSPADM

## 2019-05-24 RX ORDER — SODIUM CHLORIDE 0.9 % (FLUSH) 0.9 %
3 SYRINGE (ML) INJECTION
Status: DISCONTINUED | OUTPATIENT
Start: 2019-05-24 | End: 2019-05-24 | Stop reason: HOSPADM

## 2019-05-24 RX ORDER — PROPOFOL 10 MG/ML
VIAL (ML) INTRAVENOUS
Status: DISCONTINUED | OUTPATIENT
Start: 2019-05-24 | End: 2019-05-24

## 2019-05-24 RX ORDER — MIDAZOLAM HYDROCHLORIDE 1 MG/ML
INJECTION, SOLUTION INTRAMUSCULAR; INTRAVENOUS
Status: DISCONTINUED | OUTPATIENT
Start: 2019-05-24 | End: 2019-05-24

## 2019-05-24 RX ORDER — ONDANSETRON 2 MG/ML
INJECTION INTRAMUSCULAR; INTRAVENOUS
Status: DISCONTINUED | OUTPATIENT
Start: 2019-05-24 | End: 2019-05-24

## 2019-05-24 RX ORDER — HYDROMORPHONE HYDROCHLORIDE 1 MG/ML
0.2 INJECTION, SOLUTION INTRAMUSCULAR; INTRAVENOUS; SUBCUTANEOUS EVERY 5 MIN PRN
Status: DISCONTINUED | OUTPATIENT
Start: 2019-05-24 | End: 2019-05-24 | Stop reason: HOSPADM

## 2019-05-24 RX ADMIN — SODIUM CHLORIDE: 0.9 INJECTION, SOLUTION INTRAVENOUS at 06:05

## 2019-05-24 RX ADMIN — ONDANSETRON 4 MG: 2 INJECTION INTRAMUSCULAR; INTRAVENOUS at 08:05

## 2019-05-24 RX ADMIN — FENTANYL CITRATE 50 MCG: 50 INJECTION, SOLUTION INTRAMUSCULAR; INTRAVENOUS at 07:05

## 2019-05-24 RX ADMIN — MIDAZOLAM HYDROCHLORIDE 2 MG: 1 INJECTION, SOLUTION INTRAMUSCULAR; INTRAVENOUS at 07:05

## 2019-05-24 RX ADMIN — SODIUM CHLORIDE, SODIUM GLUCONATE, SODIUM ACETATE, POTASSIUM CHLORIDE, MAGNESIUM CHLORIDE, SODIUM PHOSPHATE, DIBASIC, AND POTASSIUM PHOSPHATE: .53; .5; .37; .037; .03; .012; .00082 INJECTION, SOLUTION INTRAVENOUS at 07:05

## 2019-05-24 RX ADMIN — CEFAZOLIN 3 G: 330 INJECTION, POWDER, FOR SOLUTION INTRAMUSCULAR; INTRAVENOUS at 07:05

## 2019-05-24 RX ADMIN — GLYCOPYRROLATE 0.2 MG: 0.2 INJECTION, SOLUTION INTRAMUSCULAR; INTRAVENOUS at 07:05

## 2019-05-24 RX ADMIN — PROPOFOL 200 MG: 10 INJECTION, EMULSION INTRAVENOUS at 07:05

## 2019-05-24 RX ADMIN — FAMOTIDINE 20 MG: 10 INJECTION, SOLUTION INTRAVENOUS at 07:05

## 2019-05-24 RX ADMIN — PROPOFOL 100 MG: 10 INJECTION, EMULSION INTRAVENOUS at 07:05

## 2019-05-24 RX ADMIN — OXYCODONE HYDROCHLORIDE AND ACETAMINOPHEN 1 TABLET: 5; 325 TABLET ORAL at 09:05

## 2019-05-24 RX ADMIN — LIDOCAINE HYDROCHLORIDE 80 MG: 20 INJECTION, SOLUTION INTRAVENOUS at 07:05

## 2019-05-24 NOTE — TRANSFER OF CARE
" Anesthesia Transfer of Care Note    Patient: Paul Li Jr.    Procedure(s) Performed: Procedure(s) (LRB):  [id number] (Left)    Patient location: PACU    Anesthesia Type: general    Transport from OR: Transported from OR on 2-3 L/min O2 by NC with adequate spontaneous ventilation    Post pain: adequate analgesia    Post assessment: no apparent anesthetic complications and tolerated procedure well    Post vital signs: stable    Level of consciousness: awake    Nausea/Vomiting: no nausea/vomiting    Complications: none    Transfer of care protocol was followed      Last vitals: 05/24/19 0839  Visit Vitals  /63 (BP Location: Right arm, Patient Position: Lying)   Pulse 72   Temp 36 °C (96.8 °F) (Axillary)   Resp 20   Ht 6' 1.5" (1.867 m)   Wt (!) 186 kg (410 lb)   SpO2 95%   BMI 53.36 kg/m²     "

## 2019-05-24 NOTE — OP NOTE
DATE OF PROCEDURE: 05/24/2019    PREOPERATIVE DIAGNOSIS: breast cancer    POSTOPERATIVE DIAGNOSIS: breast cancer    PROCEDURES PERFORMED: Left IJ port-a-cath placement    ATTENDING SURGEON: Shima Whyte MD    ASSISTING SURGEON: Ramesh Kenny MD    FINDINGS: Left IJ port-a-cath placement with catheter tip at bonnie as seen on fluoroscopy.     ANESTHESIA: general  With LMA    COMPLICATIONS:  None.    ESTIMATED BLOOD LOSS:  Less than 5 mL.    INDICATIONS FOR PROCEDURE: breast cancer    PROCEDURE IN DETAIL:  Once consent was reviewed and a timeout was done in the operating room, the patient was positioned in a supine position with a shoulder roll placed transversely. The left IJ was confirmed to be patent before prepping the patient. The patient's left chest and neck were then prepped and draped in the usual sterile fashion. Local anesthesia was then administered over the site of the left IJ as previously seen on ultrasound. A small skin knick was made at this location and the left IJ vein was then visualized under ultrasound guidance. The left IJ was then cannulated with the introducer needle on the second attempt. Upon easy withdrawal of blood, the guidewire was advanced into the needle. After it passed easily, the needle was removed. This was visualized under fluoroscopy to be in the IVC.  Approximately 2 finger breadths below the mid clavicle, a 4cm sharp incision was made and a port pocket was created using electrocautery and blunt dissection after local infiltration of local anesthetic.  Local anesthetic was then infiltrated along a tract from the port pocket to the left IJ access point in the neck. A counter incision was then made between the port pocket and the IJ access point. A small flap was raised and pocket was created. The port fit into pocket well.  Next, the catheter was attached to the tunneling device. The catheter was tunneled first to the counter incision and then to the IJ access point. Next,  the dilator and peel-away sheath were advanced over the wire  under fluoroscopy into the left IJ vein. The wire and dilator were removed and the catheter was advanced into the peel-away sheath with ease. The peel-away sheath was then peeled away and the port was flushed with injectable saline without difficulty after aspiration of blood. Next, the catheter was drawn back from the port pocket site until the catheter tip was at the level of the bonnie as seen on fluoroscopy. At this point, the port was secured to the catheter and sutured into place at two points using 2-0 vicryl. The port was then again aspirated with injectable saline and then flushed with heparin. This was done easily. At this point, the port wound was closed with interrupted 3-0 vicryl sutures and the skin was closed with a running 4-0 monocryl subcuticular stitch. The counter incision and left IJ stick site were closed with single deep dermal 4-0 monocryl sutures. The skin was then cleaned and dried and the wounds were dressed with surgical glue. The patient was awakened from anesthesia without any complications or difficulty and transferred to Recovery Room in stable condition. Post operative films to follow in PACU.

## 2019-05-24 NOTE — PROGRESS NOTES
Dr Kenny (sx) at the bedside speaking with patient.  md has reviewed cxr and stated pt is ok to be discharged.

## 2019-05-24 NOTE — PROGRESS NOTES
Pt discharged to home . Pt IV removed. Pt has all discharge instructions, paper prescriptions and personal belongings. Tolerating clear liquids well. No further questions. Adequate for discharge.

## 2019-05-24 NOTE — OP NOTE
Port Placement Procedure Note        Physician: Surgeon(s) and Role:     * Shima Whyte MD - Primary     * Ramesh Kenny MD - Resident - Assisting    Procedure: Central Line Placement port a cath left with ultrasound and fluoroscopy    Date: 5/24/2019    Location: left internal jugular vein    Anesthesia: Local and Moderate Sedation  Full Drape Used: Yes    Procedure:  After informed consent and timeout was performed, the patient was taken to the operating room. Patient was placed supine on the table and arms were tucked by her side. Ultrasound was used to ensure the left internal jugular vein was patent. Next, local anesthesia was injected and a small nick was made in the skin in the left neck. Ultrasound was used for guidance and an 18-gauge hollow needle was used to access the vessel. Wire was inserted through the access needle and fluoroscopy and ultrasound were used to confirm the wire was in the correct position. Next, a counter incision was made.  Following this local anesthetic was injected on the anterior left chest.  A small incision was made and the port pocket was created using electrocautery. Next,  the catheter was tunneled from the anterior left chest. Then over the wire, the vessel was dilated and the catheter was passed into the vessel via the Seldinger technique under direct fluoroscopic guidance. The catheter was confirmed to be in proper position in the SVC using fluoroscopy. The catheter was aspirated and blood return was good. Next, the catheter was cut to the appropriate length and attached to the port. Again, blood return was noted, and we then flushed the catheter with heparinized saline. The port was sutured in place using a 3-0 vicryl. The port incision was closed using interuppted 3-0 vicryl followed by running 4-0 monocryl subcuticular.  All skin incisions were closed with a 4-0 monocryl. Dressings were applied and all counts were correct at the end of the procedure. Patient tolerated the  procedure well. A chest x-ray will be performed in the recovery room.    Confirmation: Fluoroscopy intraop    Complications/Comments:  none    Estimated Blood Loss: 2 cc    Disposition: PACU in stable condition    Attestation: I was present for the entire procedure assisted by a qualified resident.

## 2019-05-24 NOTE — ANESTHESIA POSTPROCEDURE EVALUATION
Anesthesia Post Evaluation    Patient: Paul Li Jr.    Procedure(s) Performed: Procedure(s) (LRB):  [id number] (Left)    Final Anesthesia Type: general  Patient location during evaluation: PACU  Patient participation: Yes- Able to Participate  Level of consciousness: awake and alert and oriented  Post-procedure vital signs: reviewed and stable  Pain management: adequate  Airway patency: patent  PONV status at discharge: No PONV  Anesthetic complications: no      Cardiovascular status: hemodynamically stable  Respiratory status: unassisted, spontaneous ventilation and room air  Hydration status: euvolemic  Follow-up not needed.          Vitals Value Taken Time   /84 5/24/2019 10:47 AM   Temp 37 °C (98.6 °F) 5/24/2019 10:45 AM   Pulse 133 5/24/2019 10:52 AM   Resp 18 5/24/2019 10:45 AM   SpO2 83 % 5/24/2019 10:52 AM   Vitals shown include unvalidated device data.      Event Time     Out of Recovery 09:23:27          Pain/Reji Score: Pain Rating Prior to Med Admin: 3 (5/24/2019 10:45 AM)  Pain Rating Post Med Admin: 3 (5/24/2019 10:45 AM)  Reji Score: 10 (5/24/2019 10:45 AM)

## 2019-05-24 NOTE — PLAN OF CARE
Pt vital signs wnl.  Pt verbalizes pain is tolerable.  Pt verbalizes no nausea.  Left chest and 2 puncture sites intact.

## 2019-05-24 NOTE — INTERVAL H&P NOTE
The patient has been examined and the H&P has been reviewed:    I concur with the findings and no changes have occurred since H&P was written.    Anesthesia/Surgery risks, benefits and alternative options discussed and understood by patient/family.      There are no hospital problems to display for this patient.

## 2019-05-24 NOTE — PLAN OF CARE
Pt prepared for procedure.    Pt resting comfortably in room, call light within reach.    Needs: Sx consent, Anesthesia consent, site carmina, H&P update

## 2019-05-24 NOTE — DISCHARGE INSTRUCTIONS
POSTOPERATIVE INSTRUCTIONS FOLLOWING PORT-A-CATH PLACMENT    The following are post-operative instructions that will help you to recover from your surgery.  Please read over these instructions carefully and contact us if we can answer any of your questions or concerns.    Dressing  You may shower after 2 days.  Do not take a tub bath and do not soak the surgical site. After two days, you can shower normally but continue to not submerge your wound in a bathtub or spa etc for 2 weeks. Over your wounds you have surgical glue. This can take 2-3 weeks to fall off.     Activity   You should be able to return to your regular activities 2 days after your surgery.  However, do not engage in strenuous activities in which you use your upper body such as:  golf, tennis, aerobics, washing windows, raking the yard, mopping, vacuuming, heavy lifting (e.g children) until you are seen for your follow-up appointment in clinic.    Medication for pain  You may find that over the counter pain medications may be sufficient for your pain.  You will be given a prescription for pain medication for more severe pain.  You should not drive or operate machinery while taking these.  Please take narcotics with food.  Narcotics can cause, or worse, constipation.  You will need to increase your fluid intake, eat high fiber foods (such as fruits and bran) and make sure that you are up and walking. You may need to take an over the counter stool softener for constipation. You may find that tylenol and ibuprofen are enough to manage your pain. Okay to use ice packs.     Please report the following:   Temperature greater than 101 degrees   Discharge or bad odor from the wound   Excessive bleeding, such as bloody dressing or extreme bruising   Redness at incision and/or drain sites   Swelling or buildup of fluid around incision   Shortness of breath     Additional information  Follow up with your oncologist regarding chemotherapy.  If this  follow-up appointment has not been made, please call the office.     After hours and on weekends, you may call the main [facility] line at [phone] and ask to have the general surgery resident paged or have me paged.

## 2019-05-24 NOTE — ANESTHESIA PREPROCEDURE EVALUATION
05/24/2019  Paul Li Jr. is a 43 y.o., male.    Anesthesia Evaluation    I have reviewed the Patient Summary Reports.    I have reviewed the Nursing Notes.   I have reviewed the Medications.     Review of Systems  Anesthesia Hx:  No problems with previous Anesthesia  History of prior surgery of interest to airway management or planning: Previous anesthesia: General  Denies Personal Hx of Anesthesia complications.   Cardiovascular:   Hypertension    Pulmonary:  Pulmonary Normal    Renal/:  Renal/ Normal     Hepatic/GI:  Hepatic/GI Normal    Neurological:  Neurology Normal    Endocrine:  Endocrine Normal  Metabolic Disorders, Morbid Obesity / BMI > 40  Psych:   Psychiatric History        Patient Active Problem List   Diagnosis    Essential hypertension    Gout    Malignant neoplasm involving both nipple and areola of right breast in male, estrogen receptor negative    Microcytic anemia    Morbid obesity with BMI of 50.0-59.9, adult    Adjustment disorder, unspecified     Past Medical History:   Diagnosis Date    HTN (hypertension)     Leg edema, right     Prediabetes      History reviewed. No pertinent surgical history.      Physical Exam  General:  Well nourished    Airway/Jaw/Neck:  Airway Findings: Mouth Opening: Normal Tongue: Normal  General Airway Assessment: Adult  Mallampati: II  TM Distance: Normal, at least 6 cm  Jaw/Neck Findings:  Neck ROM: Normal ROM      Dental:  Dental Findings: In tact   Chest/Lungs:  Chest/Lungs Findings: Clear to auscultation     Heart/Vascular:  Heart Findings: Rate: Normal  Rhythm: Regular Rhythm  Sounds: Normal        Mental Status:  Mental Status Findings:  Cooperative, Alert and Oriented         Anesthesia Plan  Type of Anesthesia, risks & benefits discussed:  Anesthesia Type:  general  Patient's Preference:   Intra-op Monitoring Plan: standard ASA  monitors  Intra-op Monitoring Plan Comments:   Post Op Pain Control Plan: per primary service following discharge from PACU  Post Op Pain Control Plan Comments:   Induction:   IV  Beta Blocker:  Patient is not currently on a Beta-Blocker (No further documentation required).       Informed Consent: Patient understands risks and agrees with Anesthesia plan.  Questions answered. Anesthesia consent signed with patient.  ASA Score: 3     Day of Surgery Review of History & Physical:    H&P update referred to the surgeon.         Ready For Surgery From Anesthesia Perspective.

## 2019-05-24 NOTE — BRIEF OP NOTE
[facility]  Brief Operative Note     SUMMARY     Surgery Date: 5/24/2019     Surgeon(s) and Role:     * Shima Whyte MD - Primary     * Ramesh Kenny MD - Resident - Assisting    Pre-op Diagnosis:  Malignant neoplasm of right male breast, unspecified estrogen receptor status, unspecified site of breast [C50.921]    Post-op Diagnosis:  Post-Op Diagnosis Codes:     * Malignant neoplasm of right male breast, unspecified estrogen receptor status, unspecified site of breast [C50.921]    Procedure(s) (LRB):  [id number] (Left)    Anesthesia: Local MAC    Description of the findings of the procedure: left IJ port-a-cath placement under fluoroscopy.     Findings/Key Components: catheter placement in good position at bonnie.     Estimated Blood Loss: minimal         Specimens:   Specimen (12h ago, onward)    None          Discharge Note    SUMMARY     Admit Date: 5/24/2019    Discharge Date and Time:  05/24/2019 8:44 AM    Hospital Course (synopsis of major diagnoses, care, treatment, and services provided during the course of the hospital stay): The patient tolerated the procedure well and subsequently had a benign hospital course. Pt was started on a surgically progressive diet, which he tolerated well.  At this time, his pain is controlled with oral pain medication, he is ambulating well, and is able to urinate on his own.  Patient stable for discharge.       Final Diagnosis: Post-Op Diagnosis Codes:     * Malignant neoplasm of right male breast, unspecified estrogen receptor status, unspecified site of breast [C50.921]    Disposition: Home or Self Care    Medications:  Reconciled Home Medications:      Medication List      START taking these medications    HYDROcodone-acetaminophen 5-325 mg per tablet  Commonly known as:  NORCO  Take 1 tablet by mouth every 6 (six) hours as needed for Pain.        CONTINUE taking these medications    amLODIPine 10 MG tablet  Commonly known as:  NORVASC  Take 1  tablet (10 mg total) by mouth once daily.     colchicine 0.6 mg tablet  Commonly known as:  COLCRYS  Take 1 tablet (0.6 mg total) by mouth 2 (two) times daily as needed.     dexamethasone 4 MG Tab  Commonly known as:  DECADRON  Take one tablet twice a day days 2,3, and 4 of each chemotherapy cycle.     ibuprofen 800 MG tablet  Commonly known as:  ADVIL,MOTRIN  Take 800 mg by mouth 3 (three) times daily.     lidocaine-prilocaine cream  Commonly known as:  EMLA  Apply to affected area 45 minutes before port access     losartan-hydrochlorothiazide 100-25 mg 100-25 mg per tablet  Commonly known as:  HYZAAR  Take 1 tablet by mouth once daily.     ondansetron 8 MG Tbdl  Commonly known as:  ZOFRAN-ODT  Take 1 tablet (8 mg total) by mouth every 12 (twelve) hours as needed.          Discharge Procedure Orders   Diet Adult Regular     Notify your health care provider if you experience any of the following:  temperature >100.4     Notify your health care provider if you experience any of the following:  persistent nausea and vomiting or diarrhea     Notify your health care provider if you experience any of the following:  severe uncontrolled pain     Notify your health care provider if you experience any of the following:  redness, tenderness, or signs of infection (pain, swelling, redness, odor or green/yellow discharge around incision site)     Notify your health care provider if you experience any of the following:  difficulty breathing or increased cough     Notify your health care provider if you experience any of the following:  severe persistent headache     Notify your health care provider if you experience any of the following:  worsening rash     Notify your health care provider if you experience any of the following:  persistent dizziness, light-headedness, or visual disturbances     Notify your health care provider if you experience any of the following:  increased confusion or weakness     Activity as tolerated    Order Comments: No driving while on narcotics and until you can react quickly without pain.     Shower on day dressing removed (No bath)   Order Comments: You can shower in 48 hours. In the shower, it is okay to let warm soapy water rinse over your wound. Do not submerge your wound in any tubs, baths, spas, or pools. Over your wound you have surgical glue. This will fall off on its own.     Follow-up Information     Shima Whyte MD.    Specialties:  Surgery, Breast Surgery  Why:  As needed if there are problems with your wound. Otherwise follow up with medical oncology and breast surgery as previously scheduled. If there are any problems, don't hestiate to call the office.   Contact information:  [address] [facility]  [facility] [id number] [phone]

## 2019-05-27 ENCOUNTER — INFUSION (OUTPATIENT)
Dept: INFUSION THERAPY | Facility: HOSPITAL | Age: 44
End: 2019-05-27
Attending: INTERNAL MEDICINE
Payer: MEDICAID

## 2019-05-27 ENCOUNTER — OFFICE VISIT (OUTPATIENT)
Dept: HEMATOLOGY/ONCOLOGY | Facility: CLINIC | Age: 44
End: 2019-05-27
Payer: MEDICAID

## 2019-05-27 ENCOUNTER — PATIENT MESSAGE (OUTPATIENT)
Dept: HEMATOLOGY/ONCOLOGY | Facility: CLINIC | Age: 44
End: 2019-05-27

## 2019-05-27 VITALS
HEART RATE: 62 BPM | WEIGHT: 315 LBS | RESPIRATION RATE: 18 BRPM | TEMPERATURE: 97 F | BODY MASS INDEX: 40.43 KG/M2 | DIASTOLIC BLOOD PRESSURE: 86 MMHG | HEIGHT: 74 IN | SYSTOLIC BLOOD PRESSURE: 155 MMHG

## 2019-05-27 VITALS
SYSTOLIC BLOOD PRESSURE: 140 MMHG | WEIGHT: 315 LBS | RESPIRATION RATE: 18 BRPM | HEART RATE: 61 BPM | BODY MASS INDEX: 40.43 KG/M2 | TEMPERATURE: 98 F | HEIGHT: 74 IN | DIASTOLIC BLOOD PRESSURE: 81 MMHG | OXYGEN SATURATION: 96 %

## 2019-05-27 DIAGNOSIS — Z17.1 MALIGNANT NEOPLASM INVOLVING BOTH NIPPLE AND AREOLA OF RIGHT BREAST IN MALE, ESTROGEN RECEPTOR NEGATIVE: ICD-10-CM

## 2019-05-27 DIAGNOSIS — F43.20 ADJUSTMENT DISORDER, UNSPECIFIED TYPE: ICD-10-CM

## 2019-05-27 DIAGNOSIS — Z17.1 MALIGNANT NEOPLASM INVOLVING BOTH NIPPLE AND AREOLA OF RIGHT BREAST IN FEMALE, ESTROGEN RECEPTOR NEGATIVE: Primary | ICD-10-CM

## 2019-05-27 DIAGNOSIS — C50.021 MALIGNANT NEOPLASM INVOLVING BOTH NIPPLE AND AREOLA OF RIGHT BREAST IN MALE, ESTROGEN RECEPTOR NEGATIVE: Primary | ICD-10-CM

## 2019-05-27 DIAGNOSIS — E66.01 MORBID OBESITY WITH BMI OF 50.0-59.9, ADULT: ICD-10-CM

## 2019-05-27 DIAGNOSIS — C50.011 MALIGNANT NEOPLASM INVOLVING BOTH NIPPLE AND AREOLA OF RIGHT BREAST IN FEMALE, ESTROGEN RECEPTOR NEGATIVE: Primary | ICD-10-CM

## 2019-05-27 DIAGNOSIS — Z17.1 MALIGNANT NEOPLASM INVOLVING BOTH NIPPLE AND AREOLA OF RIGHT BREAST IN MALE, ESTROGEN RECEPTOR NEGATIVE: Primary | ICD-10-CM

## 2019-05-27 DIAGNOSIS — C50.021 MALIGNANT NEOPLASM INVOLVING BOTH NIPPLE AND AREOLA OF RIGHT BREAST IN MALE, ESTROGEN RECEPTOR NEGATIVE: ICD-10-CM

## 2019-05-27 DIAGNOSIS — C50.919 BREAST CANCER: Primary | ICD-10-CM

## 2019-05-27 DIAGNOSIS — D50.9 MICROCYTIC ANEMIA: ICD-10-CM

## 2019-05-27 LAB
ALBUMIN SERPL BCP-MCNC: 4 G/DL (ref 3.5–5.2)
ALP SERPL-CCNC: 73 U/L (ref 55–135)
ALT SERPL W/O P-5'-P-CCNC: 30 U/L (ref 10–44)
ANION GAP SERPL CALC-SCNC: 7 MMOL/L (ref 8–16)
AST SERPL-CCNC: 21 U/L (ref 10–40)
BILIRUB SERPL-MCNC: 0.9 MG/DL (ref 0.1–1)
BUN SERPL-MCNC: 11 MG/DL (ref 6–20)
CALCIUM SERPL-MCNC: 9.6 MG/DL (ref 8.7–10.5)
CHLORIDE SERPL-SCNC: 103 MMOL/L (ref 95–110)
CO2 SERPL-SCNC: 27 MMOL/L (ref 23–29)
CREAT SERPL-MCNC: 1.2 MG/DL (ref 0.5–1.4)
ERYTHROCYTE [DISTWIDTH] IN BLOOD BY AUTOMATED COUNT: 14.3 % (ref 11.5–14.5)
EST. GFR  (AFRICAN AMERICAN): >60 ML/MIN/1.73 M^2
EST. GFR  (NON AFRICAN AMERICAN): >60 ML/MIN/1.73 M^2
GLUCOSE SERPL-MCNC: 96 MG/DL (ref 70–110)
HCT VFR BLD AUTO: 37.9 % (ref 40–54)
HGB BLD-MCNC: 12.1 G/DL (ref 14–18)
IMM GRANULOCYTES # BLD AUTO: 0.01 K/UL (ref 0–0.04)
MCH RBC QN AUTO: 25.5 PG (ref 27–31)
MCHC RBC AUTO-ENTMCNC: 31.9 G/DL (ref 32–36)
MCV RBC AUTO: 80 FL (ref 82–98)
NEUTROPHILS # BLD AUTO: 2.2 K/UL (ref 1.8–7.7)
PLATELET # BLD AUTO: 204 K/UL (ref 150–350)
PMV BLD AUTO: 11.1 FL (ref 9.2–12.9)
POTASSIUM SERPL-SCNC: 4.2 MMOL/L (ref 3.5–5.1)
PROT SERPL-MCNC: 7.9 G/DL (ref 6–8.4)
RBC # BLD AUTO: 4.75 M/UL (ref 4.6–6.2)
SODIUM SERPL-SCNC: 137 MMOL/L (ref 136–145)
WBC # BLD AUTO: 4.83 K/UL (ref 3.9–12.7)

## 2019-05-27 PROCEDURE — 99999 PR PBB SHADOW E&M-EST. PATIENT-LVL III: ICD-10-PCS | Mod: PBBFAC,,, | Performed by: INTERNAL MEDICINE

## 2019-05-27 PROCEDURE — 85027 COMPLETE CBC AUTOMATED: CPT

## 2019-05-27 PROCEDURE — 99214 PR OFFICE/OUTPT VISIT, EST, LEVL IV, 30-39 MIN: ICD-10-PCS | Mod: S$PBB,,, | Performed by: INTERNAL MEDICINE

## 2019-05-27 PROCEDURE — 99999 PR PBB SHADOW E&M-EST. PATIENT-LVL III: CPT | Mod: PBBFAC,,, | Performed by: INTERNAL MEDICINE

## 2019-05-27 PROCEDURE — 25000003 PHARM REV CODE 250: Performed by: INTERNAL MEDICINE

## 2019-05-27 PROCEDURE — A4216 STERILE WATER/SALINE, 10 ML: HCPCS | Performed by: INTERNAL MEDICINE

## 2019-05-27 PROCEDURE — 99214 OFFICE O/P EST MOD 30 MIN: CPT | Mod: S$PBB,,, | Performed by: INTERNAL MEDICINE

## 2019-05-27 PROCEDURE — 96413 CHEMO IV INFUSION 1 HR: CPT

## 2019-05-27 PROCEDURE — 99213 OFFICE O/P EST LOW 20 MIN: CPT | Mod: PBBFAC,25 | Performed by: INTERNAL MEDICINE

## 2019-05-27 PROCEDURE — 63600175 PHARM REV CODE 636 W HCPCS: Mod: JG | Performed by: INTERNAL MEDICINE

## 2019-05-27 PROCEDURE — 96367 TX/PROPH/DG ADDL SEQ IV INF: CPT

## 2019-05-27 PROCEDURE — 80053 COMPREHEN METABOLIC PANEL: CPT

## 2019-05-27 PROCEDURE — 63600175 PHARM REV CODE 636 W HCPCS: Performed by: INTERNAL MEDICINE

## 2019-05-27 PROCEDURE — 36591 DRAW BLOOD OFF VENOUS DEVICE: CPT

## 2019-05-27 PROCEDURE — 96411 CHEMO IV PUSH ADDL DRUG: CPT

## 2019-05-27 RX ORDER — HEPARIN 100 UNIT/ML
500 SYRINGE INTRAVENOUS
Status: DISCONTINUED | OUTPATIENT
Start: 2019-05-27 | End: 2019-05-27 | Stop reason: HOSPADM

## 2019-05-27 RX ORDER — HEPARIN 100 UNIT/ML
500 SYRINGE INTRAVENOUS
Status: CANCELLED | OUTPATIENT
Start: 2019-05-27

## 2019-05-27 RX ORDER — HEPARIN 100 UNIT/ML
500 SYRINGE INTRAVENOUS
Status: COMPLETED | OUTPATIENT
Start: 2019-05-27 | End: 2019-05-27

## 2019-05-27 RX ORDER — DOXORUBICIN HYDROCHLORIDE 2 MG/ML
60 INJECTION, SOLUTION INTRAVENOUS
Status: COMPLETED | OUTPATIENT
Start: 2019-05-27 | End: 2019-05-27

## 2019-05-27 RX ORDER — SODIUM CHLORIDE 0.9 % (FLUSH) 0.9 %
10 SYRINGE (ML) INJECTION
Status: DISCONTINUED | OUTPATIENT
Start: 2019-05-27 | End: 2019-05-27 | Stop reason: HOSPADM

## 2019-05-27 RX ORDER — DOXORUBICIN HYDROCHLORIDE 2 MG/ML
60 INJECTION, SOLUTION INTRAVENOUS
Status: CANCELLED | OUTPATIENT
Start: 2019-05-27

## 2019-05-27 RX ORDER — SODIUM CHLORIDE 0.9 % (FLUSH) 0.9 %
10 SYRINGE (ML) INJECTION
Status: CANCELLED | OUTPATIENT
Start: 2019-05-27

## 2019-05-27 RX ADMIN — DEXAMETHASONE SODIUM PHOSPHATE: 4 INJECTION, SOLUTION INTRAMUSCULAR; INTRAVENOUS at 02:05

## 2019-05-27 RX ADMIN — DOXORUBICIN HYDROCHLORIDE 186 MG: 2 INJECTION, SOLUTION INTRAVENOUS at 03:05

## 2019-05-27 RX ADMIN — Medication 10 ML: at 12:05

## 2019-05-27 RX ADMIN — HEPARIN 500 UNITS: 100 SYRINGE at 04:05

## 2019-05-27 RX ADMIN — HEPARIN 500 UNITS: 100 SYRINGE at 12:05

## 2019-05-27 RX ADMIN — SODIUM CHLORIDE: 9 INJECTION, SOLUTION INTRAVENOUS at 02:05

## 2019-05-27 RX ADMIN — CYCLOPHOSPHAMIDE 1865 MG: 1 INJECTION, POWDER, FOR SOLUTION INTRAVENOUS; ORAL at 03:05

## 2019-05-27 RX ADMIN — APREPITANT 130 MG: 130 INJECTION, EMULSION INTRAVENOUS at 02:05

## 2019-05-27 RX ADMIN — Medication 10 ML: at 04:05

## 2019-05-27 NOTE — PLAN OF CARE
Problem: Adult Inpatient Plan of Care  Goal: Plan of Care Review  Outcome: Ongoing (interventions implemented as appropriate)  Pt tolerated A/C infusion without issue, pt to rtc 6/10/19, avs given to pt, no distress noted upon d/c to home

## 2019-05-27 NOTE — ASSESSMENT & PLAN NOTE
- start neoadjuvant ddAC with Neulasta. Consent signed and pre-treatment echo looks good. Antiemetics at home.

## 2019-05-27 NOTE — PROGRESS NOTES
Niles Currie. I seen Mr. Li today while he was receiving his chemo today. I scheduled the appointment with his wife on 617 @ 8am.

## 2019-05-27 NOTE — PROGRESS NOTES
History:     Reason For Consultation:   1. Stage IB ([id number]) triple negative invasive ductal carcinoma with lobular features of right breast, retroareolar, Grade 2, Ki67 80%, diagnosed 5/2019    Records Obtained: Records of the patients history including those obtained from the referring provider were reviewed and summarized in detail.    HPI:   Paul Li Jr. presents for follow up of breast cancer. He is initiating chemotherapy this week. He had chemoeducation this morning. Antiemetics are at home. Pre-treatment echo reviewed and EF good. Port was placed last week. He's doing well.     History: I reviewed, confirmed and updated history (past medical, social, family) as necessary.    Medications    Current Outpatient Medications:     amLODIPine (NORVASC) 10 MG tablet, Take 1 tablet (10 mg total) by mouth once daily., Disp: 30 tablet, Rfl: 11    dexamethasone (DECADRON) 4 MG Tab, Take one tablet twice a day days 2,3, and 4 of each chemotherapy cycle., Disp: 12 tablet, Rfl: 1    HYDROcodone-acetaminophen (NORCO) 5-325 mg per tablet, Take 1 tablet by mouth every 6 (six) hours as needed for Pain., Disp: 8 tablet, Rfl: 0    ibuprofen (ADVIL,MOTRIN) 800 MG tablet, Take 800 mg by mouth 3 (three) times daily., Disp: , Rfl:     lidocaine-prilocaine (EMLA) cream, Apply to affected area 45 minutes before port access, Disp: 30 g, Rfl: 1    losartan-hydrochlorothiazide 100-25 mg (HYZAAR) 100-25 mg per tablet, Take 1 tablet by mouth once daily., Disp: 90 tablet, Rfl: 3    ondansetron (ZOFRAN-ODT) 8 MG TbDL, Take 1 tablet (8 mg total) by mouth every 12 (twelve) hours as needed., Disp: 20 tablet, Rfl: 1    colchicine 0.6 mg tablet, Take 1 tablet (0.6 mg total) by mouth 2 (two) times daily as needed., Disp: 9 tablet, Rfl: 11  No current facility-administered medications for this visit.   Allergies  Review of patient's allergies indicates:  No Known Allergies  Review of Systems  Review of Systems   Constitutional:  "Negative for activity change, appetite change, chills, fatigue, fever and unexpected weight change.   HENT: Negative for congestion, hearing loss, nosebleeds, sinus pressure and trouble swallowing.    Eyes: Negative for pain, discharge, itching and visual disturbance.   Respiratory: Negative for cough, chest tightness and shortness of breath.    Cardiovascular: Negative for chest pain, palpitations and leg swelling.   Gastrointestinal: Negative for abdominal distention, abdominal pain, blood in stool, diarrhea, nausea, rectal pain and vomiting.   Endocrine: Negative for heat intolerance and polydipsia.   Genitourinary: Negative for difficulty urinating, dysuria, flank pain, frequency, hematuria and urgency.   Musculoskeletal: Negative for arthralgias, back pain and neck pain.   Skin: Negative for color change, pallor and rash.   Neurological: Negative for dizziness, numbness and headaches.   Hematological: Negative for adenopathy. Does not bruise/bleed easily.   Psychiatric/Behavioral: Negative for confusion and decreased concentration. The patient is nervous/anxious.         Objective     Objective:     Vitals:    05/27/19 1259   BP: (!) 140/81   BP Location: Right arm   Patient Position: Sitting   BP Method: Large (Automatic)   Pulse: 61   Resp: 18   Temp: 98 °F (36.7 °C)   TempSrc: Oral   SpO2: 96%   Weight: (!) 185.1 kg (408 lb 1.1 oz)   Height: 6' 1.5" (1.867 m)   Body mass index is 53.11 kg/m².    Body surface area is 3.1 meters squared.  Physical Exam   Constitutional: He is oriented to person, place, and time. He appears well-developed and well-nourished. No distress.   HENT:   Head: Normocephalic and atraumatic.   Mouth/Throat: Oropharynx is clear and moist and mucous membranes are normal. No oral lesions.   Eyes: Conjunctivae and EOM are normal. Right eye exhibits no discharge. Left eye exhibits no discharge. No scleral icterus.   Cardiovascular: Normal rate, regular rhythm, S1 normal, S2 normal and normal " heart sounds.   Pulmonary/Chest: Effort normal and breath sounds normal. No respiratory distress. He has no wheezes. He has no rhonchi. He has no rales.   Right breast with nipple retraction and skin thickening around nipple but without well defined mass.   Left mediport accessed   Abdominal: Soft. Normal appearance and bowel sounds are normal. There is no tenderness.   Lymphadenopathy:     He has no cervical adenopathy.     He has no axillary adenopathy.        Right: No supraclavicular adenopathy present.        Left: No supraclavicular adenopathy present.   Neurological: He is alert and oriented to person, place, and time. He has normal strength.   Skin: Skin is warm, dry and intact. No pallor.   Psychiatric: His behavior is normal. Thought content normal.     Initial Breast exam (pre-chemo): Right breast with nipple retraction and skin thickening around nipple without well defined mass behind the skin thickening.    Labs and Imaging  Results for orders placed or performed in visit on 05/27/19   CBC Oncology   Result Value Ref Range    WBC 4.83 3.90 - 12.70 K/uL    RBC 4.75 4.60 - 6.20 M/uL    Hemoglobin 12.1 (L) 14.0 - 18.0 g/dL    Hematocrit 37.9 (L) 40.0 - 54.0 %    Mean Corpuscular Volume 80 (L) 82 - 98 fL    Mean Corpuscular Hemoglobin 25.5 (L) 27.0 - 31.0 pg    Mean Corpuscular Hemoglobin Conc 31.9 (L) 32.0 - 36.0 g/dL    RDW 14.3 11.5 - 14.5 %    Platelets 204 150 - 350 K/uL    MPV 11.1 9.2 - 12.9 fL    Gran # (ANC) 2.2 1.8 - 7.7 K/uL    Immature Grans (Abs) 0.01 0.00 - 0.04 K/uL   Comprehensive metabolic panel   Result Value Ref Range    Sodium 137 136 - 145 mmol/L    Potassium 4.2 3.5 - 5.1 mmol/L    Chloride 103 95 - 110 mmol/L    CO2 27 23 - 29 mmol/L    Glucose 96 70 - 110 mg/dL    BUN, Bld 11 6 - 20 mg/dL    Creatinine 1.2 0.5 - 1.4 mg/dL    Calcium 9.6 8.7 - 10.5 mg/dL    Total Protein 7.9 6.0 - 8.4 g/dL    Albumin 4.0 3.5 - 5.2 g/dL    Total Bilirubin 0.9 0.1 - 1.0 mg/dL    Alkaline Phosphatase 73  55 - 135 U/L    AST 21 10 - 40 U/L    ALT 30 10 - 44 U/L    Anion Gap 7 (L) 8 - 16 mmol/L    eGFR if African American >60.0 >60 mL/min/1.73 m^2    eGFR if non African American >60.0 >60 mL/min/1.73 m^2     Echo reviewed.     Assessment     Assessment:     Problem List Items Addressed This Visit        Psychiatric    Adjustment disorder, unspecified    Overview     Secondary to new breast cancer diagnosis.          Current Assessment & Plan     Referred to Dr Nunez            Oncology    Malignant neoplasm involving both nipple and areola of right breast in male, estrogen receptor negative - Primary    Overview     1. Stage IB ([id number]) invasive ductal carcinoma with lobular features of right breast, retroareolar, ER neg, MO neg, Her2 neg, Grade 2, Ki67 80%, diagnosed 5/2019   * 5/27/19 initiated ddAC followed by weekly Taxol.          Current Assessment & Plan     - start neoadjuvant ddAC with Neulasta. Consent signed and pre-treatment echo looks good. Antiemetics at home.            Microcytic anemia    Overview     - Microcytic anemia present since atleast 2017 without significant change. Mild iron deficiency.   - Will test with hemoglobin electrophoresis in future.         Current Assessment & Plan     Start ferrous sulfate 325 mg daily.             Endocrine    Morbid obesity with BMI of 50.0-59.9, adult    Overview     - Discussed weight loss         Current Assessment & Plan     Actively losing weight with dietary changes               Plan:     Cycle 1 ddAC today with next day growth factor. Insurance declined [employer] Neulasta.  Antiemetics.   Weight loss.   MD in 2 weeks for cycle 2.     I asked the patient to call for any questions, concerns, or new symptoms.   Answered questions regarding prognosis, treatment plan, side effects.

## 2019-05-27 NOTE — PLAN OF CARE
Problem: Adult Inpatient Plan of Care  Goal: Plan of Care Review  Outcome: Ongoing (interventions implemented as appropriate)  Pt here for A/C D1C1, labs, hx, meds, allergies reviewed, pt with no complaints at this time, reclined in chair, continue to monitor

## 2019-05-28 ENCOUNTER — INFUSION (OUTPATIENT)
Dept: INFUSION THERAPY | Facility: HOSPITAL | Age: 44
End: 2019-05-28
Attending: INTERNAL MEDICINE
Payer: MEDICAID

## 2019-05-28 ENCOUNTER — PATIENT MESSAGE (OUTPATIENT)
Dept: HEMATOLOGY/ONCOLOGY | Facility: CLINIC | Age: 44
End: 2019-05-28

## 2019-05-28 DIAGNOSIS — C50.021 MALIGNANT NEOPLASM INVOLVING BOTH NIPPLE AND AREOLA OF RIGHT BREAST IN MALE, ESTROGEN RECEPTOR NEGATIVE: Primary | ICD-10-CM

## 2019-05-28 DIAGNOSIS — Z17.1 MALIGNANT NEOPLASM INVOLVING BOTH NIPPLE AND AREOLA OF RIGHT BREAST IN MALE, ESTROGEN RECEPTOR NEGATIVE: Primary | ICD-10-CM

## 2019-05-28 PROCEDURE — 63600175 PHARM REV CODE 636 W HCPCS: Mod: JG | Performed by: INTERNAL MEDICINE

## 2019-05-28 PROCEDURE — 96372 THER/PROPH/DIAG INJ SC/IM: CPT

## 2019-05-28 RX ADMIN — PEGFILGRASTIM-CBQV 6 MG: 6 INJECTION, SOLUTION SUBCUTANEOUS at 11:05

## 2019-05-28 NOTE — NURSING
Patient here for Udenyca  Injection-denies any side effects from chemo yesterday-teaching done on use and side effects of Udenyca with pamphlet and thermometer-tolerated injection well.

## 2019-06-04 ENCOUNTER — DOCUMENTATION ONLY (OUTPATIENT)
Dept: HEMATOLOGY/ONCOLOGY | Facility: CLINIC | Age: 44
End: 2019-06-04

## 2019-06-04 NOTE — PROGRESS NOTES
Received call from patient who needs assistance in applying for disability benefits through Social Security. Briefly explained the application process to patient. With patient's approval [facility] and received a reply from Shantel Hodge - asked that patient's information be given to [facility] [employer] for their staff to help patient to apply for Social Security Disability. Asked patient to contact me on Friday of this week if he has not heard anything yet from the [employer] staff, and he stated understanding. Will continue to follow and assist as needs identified.

## 2019-06-05 NOTE — PROGRESS NOTES
History:     Reason For Consultation:   1. Stage IB ([id number]) triple negative invasive ductal carcinoma with lobular features of right breast, retroareolar, Grade 2, Ki67 80%, diagnosed 5/2019    Records Obtained: Records of the patients history including those obtained from the referring provider were reviewed and summarized in detail.    HPI:   Paul Li Jr. presents for follow up of breast cancer and cycle 2 ddAC with next day Neulasta. Patient had lethargy days 3-5 - wife was having to wake patient up to eat. He had constipation which improved with senna S and dulcolax. No chest pain. Feeling well today. Afebrile. Poor appetite and poor taste.     History: I reviewed, confirmed and updated history (past medical, social, family) as necessary.    Medications    Current Outpatient Medications:     amLODIPine (NORVASC) 10 MG tablet, Take 1 tablet (10 mg total) by mouth once daily., Disp: 30 tablet, Rfl: 11    dexamethasone (DECADRON) 4 MG Tab, Take one tablet twice a day days 2,3, and 4 of each chemotherapy cycle., Disp: 12 tablet, Rfl: 1    HYDROcodone-acetaminophen (NORCO) 5-325 mg per tablet, Take 1 tablet by mouth every 6 (six) hours as needed for Pain., Disp: 8 tablet, Rfl: 0    ibuprofen (ADVIL,MOTRIN) 800 MG tablet, Take 800 mg by mouth 3 (three) times daily., Disp: , Rfl:     lidocaine-prilocaine (EMLA) cream, Apply to affected area 45 minutes before port access, Disp: 30 g, Rfl: 1    losartan-hydrochlorothiazide 100-25 mg (HYZAAR) 100-25 mg per tablet, Take 1 tablet by mouth once daily., Disp: 90 tablet, Rfl: 3    ondansetron (ZOFRAN-ODT) 8 MG TbDL, Take 1 tablet (8 mg total) by mouth every 12 (twelve) hours as needed., Disp: 20 tablet, Rfl: 1    colchicine 0.6 mg tablet, Take 1 tablet (0.6 mg total) by mouth 2 (two) times daily as needed., Disp: 9 tablet, Rfl: 11  Allergies  Review of patient's allergies indicates:  No Known Allergies  Review of Systems  Review of Systems   Constitutional:  Positive for appetite change and fatigue. Negative for activity change, chills, fever and unexpected weight change.   HENT: Negative for congestion, hearing loss, nosebleeds, sinus pressure and trouble swallowing.    Eyes: Negative for pain, discharge, itching and visual disturbance.   Respiratory: Negative for cough, chest tightness and shortness of breath.    Cardiovascular: Negative for chest pain, palpitations and leg swelling.   Gastrointestinal: Positive for constipation. Negative for abdominal distention, abdominal pain, blood in stool, diarrhea, nausea, rectal pain and vomiting.   Endocrine: Negative for heat intolerance and polydipsia.   Genitourinary: Negative for difficulty urinating, dysuria, flank pain, frequency, hematuria and urgency.   Musculoskeletal: Negative for arthralgias, back pain and neck pain.   Skin: Negative for color change, pallor and rash.   Neurological: Negative for dizziness, numbness and headaches.   Hematological: Negative for adenopathy. Does not bruise/bleed easily.   Psychiatric/Behavioral: Negative for confusion and decreased concentration. The patient is not nervous/anxious.         Objective     Objective:     Vitals:    06/10/19 0806   BP: 135/67   BP Location: Right arm   Patient Position: Sitting   Pulse: 68   Resp: 20   Temp: 98.3 °F (36.8 °C)   TempSrc: Oral   Weight: (!) 184.6 kg (406 lb 15.5 oz)   Body mass index is 52.97 kg/m².    Body surface area is 3.09 meters squared.  Physical Exam   Constitutional: He is oriented to person, place, and time. He appears well-developed and well-nourished. No distress.   HENT:   Head: Normocephalic and atraumatic.   Mouth/Throat: Oropharynx is clear and moist and mucous membranes are normal. No oral lesions.   Eyes: Conjunctivae and EOM are normal. Right eye exhibits no discharge. Left eye exhibits no discharge. No scleral icterus.   Cardiovascular: Normal rate, regular rhythm, S1 normal, S2 normal and normal heart sounds.    Pulmonary/Chest: Effort normal and breath sounds normal. No respiratory distress. He has no wheezes. He has no rhonchi. He has no rales.   Right breast with nipple retraction and skin thickening improving.  Left mediport not accessed   Abdominal: Soft. Normal appearance and bowel sounds are normal. There is no tenderness.   Lymphadenopathy:     He has no cervical adenopathy.     He has no axillary adenopathy.        Right: No supraclavicular adenopathy present.        Left: No supraclavicular adenopathy present.   Neurological: He is alert and oriented to person, place, and time. He has normal strength.   Skin: Skin is warm, dry and intact. No pallor.   Psychiatric: His behavior is normal. Thought content normal.     Initial Breast exam (pre-chemo): Right breast with nipple retraction and skin thickening around nipple without well defined mass behind the skin thickening.    Labs and Imaging  Results for orders placed or performed in visit on 06/10/19   CBC Oncology   Result Value Ref Range    WBC 6.13 3.90 - 12.70 K/uL    RBC 4.56 (L) 4.60 - 6.20 M/uL    Hemoglobin 11.4 (L) 14.0 - 18.0 g/dL    Hematocrit 35.4 (L) 40.0 - 54.0 %    Mean Corpuscular Volume 78 (L) 82 - 98 fL    Mean Corpuscular Hemoglobin 25.0 (L) 27.0 - 31.0 pg    Mean Corpuscular Hemoglobin Conc 32.2 32.0 - 36.0 g/dL    RDW 14.4 11.5 - 14.5 %    Platelets 124 (L) 150 - 350 K/uL    MPV 11.2 9.2 - 12.9 fL    Gran # (ANC) 3.1 1.8 - 7.7 K/uL    Immature Grans (Abs) 0.51 (H) 0.00 - 0.04 K/uL   Comprehensive metabolic panel   Result Value Ref Range    Sodium 138 136 - 145 mmol/L    Potassium 3.9 3.5 - 5.1 mmol/L    Chloride 107 95 - 110 mmol/L    CO2 24 23 - 29 mmol/L    Glucose 126 (H) 70 - 110 mg/dL    BUN, Bld 17 6 - 20 mg/dL    Creatinine 1.2 0.5 - 1.4 mg/dL    Calcium 9.2 8.7 - 10.5 mg/dL    Total Protein 7.0 6.0 - 8.4 g/dL    Albumin 3.6 3.5 - 5.2 g/dL    Total Bilirubin 0.3 0.1 - 1.0 mg/dL    Alkaline Phosphatase 67 55 - 135 U/L    AST 17 10 - 40  U/L    ALT 36 10 - 44 U/L    Anion Gap 7 (L) 8 - 16 mmol/L    eGFR if African American >60.0 >60 mL/min/1.73 m^2    eGFR if non African American >60.0 >60 mL/min/1.73 m^2       Assessment     Assessment:     Problem List Items Addressed This Visit        Psychiatric    Adjustment disorder, unspecified    Overview     Secondary to new breast cancer diagnosis.          Current Assessment & Plan     Seeing Dr Nunez later this week. Doing well today.             Oncology    Malignant neoplasm involving both nipple and areola of right breast in male, estrogen receptor negative - Primary    Overview     1. Stage IB ([id number]) invasive ductal carcinoma with lobular features of right breast, retroareolar, ER neg, [place] neg, Her2 neg, Grade 2, Ki67 80%, diagnosed 5/2019   * 5/27/19 initiated neoadjuvant ddAC followed by weekly Taxol.          Current Assessment & Plan     - Cycle 2 ddAC. Tumor shrinking         Microcytic anemia    Overview     - Microcytic anemia present since atleast 2017 without significant change. Mild iron deficiency.   - Will test with hemoglobin electrophoresis in future.         Relevant Orders    Iron and TIBC    Ferritin       Endocrine    Morbid obesity with BMI of 50.0-59.9, adult    Overview     - Discussed weight loss         Current Assessment & Plan     Working on weight loss.             GI    Drug induced constipation    Overview     Secondary to chemotherapy         Current Assessment & Plan     Senna S daily and Dulcolax prn               Plan:   Cycle 2 ddAC; remainder as above.   MD in 2 weeks.   In addition to chemo monitoring, I eval'd tumor response to chemotherapy.   Future Appointments   Date Time Provider Department Center   6/10/2019  9:30 AM [facility]   6/11/2019 10:30 AM [facility]   6/17/2019  8:00 AM Matthew Sandoval, PhD [facility] CAN [facility]       Distress Screening Results: Psychosocial Distress  screening score of Distress Score: 0 noted and reviewed. No intervention indicated.

## 2019-06-10 ENCOUNTER — INFUSION (OUTPATIENT)
Dept: INFUSION THERAPY | Facility: HOSPITAL | Age: 44
End: 2019-06-10
Attending: INTERNAL MEDICINE
Payer: MEDICAID

## 2019-06-10 ENCOUNTER — OFFICE VISIT (OUTPATIENT)
Dept: HEMATOLOGY/ONCOLOGY | Facility: CLINIC | Age: 44
End: 2019-06-10
Payer: MEDICAID

## 2019-06-10 VITALS
WEIGHT: 315 LBS | TEMPERATURE: 98 F | BODY MASS INDEX: 52.97 KG/M2 | HEART RATE: 68 BPM | RESPIRATION RATE: 20 BRPM | SYSTOLIC BLOOD PRESSURE: 135 MMHG | DIASTOLIC BLOOD PRESSURE: 67 MMHG

## 2019-06-10 VITALS
WEIGHT: 315 LBS | DIASTOLIC BLOOD PRESSURE: 63 MMHG | RESPIRATION RATE: 18 BRPM | SYSTOLIC BLOOD PRESSURE: 137 MMHG | BODY MASS INDEX: 40.43 KG/M2 | TEMPERATURE: 98 F | HEART RATE: 64 BPM | HEIGHT: 74 IN

## 2019-06-10 DIAGNOSIS — D50.9 MICROCYTIC ANEMIA: ICD-10-CM

## 2019-06-10 DIAGNOSIS — K59.03 DRUG INDUCED CONSTIPATION: ICD-10-CM

## 2019-06-10 DIAGNOSIS — Z17.1 MALIGNANT NEOPLASM INVOLVING BOTH NIPPLE AND AREOLA OF RIGHT BREAST IN MALE, ESTROGEN RECEPTOR NEGATIVE: Primary | ICD-10-CM

## 2019-06-10 DIAGNOSIS — C50.021 MALIGNANT NEOPLASM INVOLVING BOTH NIPPLE AND AREOLA OF RIGHT BREAST IN MALE, ESTROGEN RECEPTOR NEGATIVE: Primary | ICD-10-CM

## 2019-06-10 DIAGNOSIS — E66.01 MORBID OBESITY WITH BMI OF 50.0-59.9, ADULT: ICD-10-CM

## 2019-06-10 DIAGNOSIS — F43.20 ADJUSTMENT DISORDER, UNSPECIFIED TYPE: ICD-10-CM

## 2019-06-10 PROCEDURE — 63600175 PHARM REV CODE 636 W HCPCS: Performed by: INTERNAL MEDICINE

## 2019-06-10 PROCEDURE — 99215 OFFICE O/P EST HI 40 MIN: CPT | Mod: S$PBB,,, | Performed by: INTERNAL MEDICINE

## 2019-06-10 PROCEDURE — 96367 TX/PROPH/DG ADDL SEQ IV INF: CPT

## 2019-06-10 PROCEDURE — 96413 CHEMO IV INFUSION 1 HR: CPT

## 2019-06-10 PROCEDURE — A4216 STERILE WATER/SALINE, 10 ML: HCPCS | Performed by: INTERNAL MEDICINE

## 2019-06-10 PROCEDURE — 99213 OFFICE O/P EST LOW 20 MIN: CPT | Mod: PBBFAC,25 | Performed by: INTERNAL MEDICINE

## 2019-06-10 PROCEDURE — 96411 CHEMO IV PUSH ADDL DRUG: CPT

## 2019-06-10 PROCEDURE — 99999 PR PBB SHADOW E&M-EST. PATIENT-LVL III: ICD-10-PCS | Mod: PBBFAC,,, | Performed by: INTERNAL MEDICINE

## 2019-06-10 PROCEDURE — 99999 PR PBB SHADOW E&M-EST. PATIENT-LVL III: CPT | Mod: PBBFAC,,, | Performed by: INTERNAL MEDICINE

## 2019-06-10 PROCEDURE — 25000003 PHARM REV CODE 250: Performed by: INTERNAL MEDICINE

## 2019-06-10 PROCEDURE — 99215 PR OFFICE/OUTPT VISIT, EST, LEVL V, 40-54 MIN: ICD-10-PCS | Mod: S$PBB,,, | Performed by: INTERNAL MEDICINE

## 2019-06-10 RX ORDER — SODIUM CHLORIDE 0.9 % (FLUSH) 0.9 %
10 SYRINGE (ML) INJECTION
Status: CANCELLED | OUTPATIENT
Start: 2019-06-10

## 2019-06-10 RX ORDER — HEPARIN 100 UNIT/ML
500 SYRINGE INTRAVENOUS
Status: CANCELLED | OUTPATIENT
Start: 2019-06-10

## 2019-06-10 RX ORDER — DOXORUBICIN HYDROCHLORIDE 2 MG/ML
60 INJECTION, SOLUTION INTRAVENOUS
Status: COMPLETED | OUTPATIENT
Start: 2019-06-10 | End: 2019-06-10

## 2019-06-10 RX ORDER — SODIUM CHLORIDE 0.9 % (FLUSH) 0.9 %
10 SYRINGE (ML) INJECTION
Status: DISCONTINUED | OUTPATIENT
Start: 2019-06-10 | End: 2019-06-10 | Stop reason: HOSPADM

## 2019-06-10 RX ORDER — HEPARIN 100 UNIT/ML
500 SYRINGE INTRAVENOUS
Status: DISCONTINUED | OUTPATIENT
Start: 2019-06-10 | End: 2019-06-10 | Stop reason: HOSPADM

## 2019-06-10 RX ORDER — DOXORUBICIN HYDROCHLORIDE 2 MG/ML
60 INJECTION, SOLUTION INTRAVENOUS
Status: CANCELLED | OUTPATIENT
Start: 2019-06-10

## 2019-06-10 RX ADMIN — DEXAMETHASONE SODIUM PHOSPHATE: 4 INJECTION, SOLUTION INTRAMUSCULAR; INTRAVENOUS at 09:06

## 2019-06-10 RX ADMIN — CYCLOPHOSPHAMIDE 1865 MG: 1 INJECTION, POWDER, FOR SOLUTION INTRAVENOUS; ORAL at 10:06

## 2019-06-10 RX ADMIN — SODIUM CHLORIDE: 9 INJECTION, SOLUTION INTRAVENOUS at 09:06

## 2019-06-10 RX ADMIN — HEPARIN 500 UNITS: 100 SYRINGE at 11:06

## 2019-06-10 RX ADMIN — APREPITANT 130 MG: 130 INJECTION, EMULSION INTRAVENOUS at 09:06

## 2019-06-10 RX ADMIN — Medication 10 ML: at 11:06

## 2019-06-10 RX ADMIN — DOXORUBICIN HYDROCHLORIDE 186 MG: 2 INJECTION, SOLUTION INTRAVENOUS at 10:06

## 2019-06-10 NOTE — PLAN OF CARE
Problem: Adult Inpatient Plan of Care  Goal: Plan of Care Review  Outcome: Ongoing (interventions implemented as appropriate)  Pt tolerated A/C infusion without issue, pt to rtc tomorrow 6/11/19 for injection, no distress noted upon d/c to home

## 2019-06-10 NOTE — PLAN OF CARE
Problem: Adult Inpatient Plan of Care  Goal: Plan of Care Review  Outcome: Ongoing (interventions implemented as appropriate)  Pt here for A/C infusion D1C2,  labs, hx, meds, allergies reviewed, pt with no complaints at this time, reclined in chair, continue to monitor

## 2019-06-10 NOTE — LETTER
Whitney 10, 2019      [facility] Hematology Oncology  [address]  [facility] [address]  Phone: [phone]       Patient: Paul Li   YOB: 1975  Date of Visit: 06/10/2019    To Whom It May Concern:    Jonathon Li was at [facility] on 06/10/2019. He may return to work/school with light restrictions, but he's going to have to work about every other week due to significant fatigue from chemotherapy. If you have any questions or concerns, or if I can be of further assistance, please do not hesitate to contact me.    Sincerely,    Richa Bahena MD

## 2019-06-10 NOTE — Clinical Note
1. Add iron/ferritin on to todays labs2. MD with cbc, cmp, ddAC with next day Neulasta in 2 and 4 weeks

## 2019-06-10 NOTE — ASSESSMENT & PLAN NOTE
Seeing Dr Nunez later this week. Doing well today.    Continue diuresis with IV lasix 80mg BID, titrating down O2.   Losartan increased, and patient started on metolazone per cards.   Medically managing severe AS.   Will attempt to speak again with family regarding betancourt placement for accurate measurement of ins and outs.

## 2019-06-11 ENCOUNTER — INFUSION (OUTPATIENT)
Dept: INFUSION THERAPY | Facility: HOSPITAL | Age: 44
End: 2019-06-11
Attending: INTERNAL MEDICINE
Payer: MEDICAID

## 2019-06-11 DIAGNOSIS — C50.021 MALIGNANT NEOPLASM INVOLVING BOTH NIPPLE AND AREOLA OF RIGHT BREAST IN MALE, ESTROGEN RECEPTOR NEGATIVE: Primary | ICD-10-CM

## 2019-06-11 DIAGNOSIS — Z17.1 MALIGNANT NEOPLASM INVOLVING BOTH NIPPLE AND AREOLA OF RIGHT BREAST IN MALE, ESTROGEN RECEPTOR NEGATIVE: Primary | ICD-10-CM

## 2019-06-11 PROCEDURE — 63600175 PHARM REV CODE 636 W HCPCS: Mod: JG | Performed by: INTERNAL MEDICINE

## 2019-06-11 PROCEDURE — 96372 THER/PROPH/DIAG INJ SC/IM: CPT

## 2019-06-11 RX ADMIN — PEGFILGRASTIM-CBQV 6 MG: 6 INJECTION, SOLUTION SUBCUTANEOUS at 10:06

## 2019-06-11 NOTE — NURSING
Patient here for udenyca injection-denies any side effects from chemo-also states did not have bone pain after last injection-tolerated well.

## 2019-06-12 ENCOUNTER — TELEPHONE (OUTPATIENT)
Dept: SURGERY | Facility: CLINIC | Age: 44
End: 2019-06-12

## 2019-06-12 NOTE — TELEPHONE ENCOUNTER
Spoke with patient regarding [facility] genetic results, results negative, patient had VUS AXIN2 and RNF4s of uncertain significance, referred to genetic counseling, all questions answered at this time, results mailed to patient

## 2019-06-13 ENCOUNTER — TELEPHONE (OUTPATIENT)
Dept: FAMILY MEDICINE | Facility: CLINIC | Age: 44
End: 2019-06-13

## 2019-06-14 NOTE — PROGRESS NOTES
History:     Reason For Consultation:   1. Stage IB ([id number]) triple negative invasive ductal carcinoma with lobular features of right breast, retroareolar, Grade 2, Ki67 80%, diagnosed 5/2019    Records Obtained: Records of the patients history including those obtained from the referring provider were reviewed and summarized in detail.    HPI:   Paul Li Jr. presents for follow up of breast cancer and cycle 3 ddAC with next day Neulasta.   His fatigue is worsening. Mood is down. He tried to go to work last week but wasn't able to. He has cough with clear sputum; no fevers.     History: I reviewed, confirmed and updated history (past medical, social, family) as necessary.      Medications    Current Outpatient Medications:     amLODIPine (NORVASC) 10 MG tablet, TAKE 1 TABLET(10 MG) BY MOUTH EVERY DAY, Disp: 30 tablet, Rfl: 0    HYDROcodone-acetaminophen (NORCO) 5-325 mg per tablet, Take 1 tablet by mouth every 6 (six) hours as needed for Pain., Disp: 8 tablet, Rfl: 0    ibuprofen (ADVIL,MOTRIN) 800 MG tablet, Take 800 mg by mouth 3 (three) times daily., Disp: , Rfl:     lidocaine-prilocaine (EMLA) cream, Apply to affected area 45 minutes before port access, Disp: 30 g, Rfl: 1    losartan-hydrochlorothiazide 100-25 mg (HYZAAR) 100-25 mg per tablet, TAKE 1 TABLET BY MOUTH EVERY DAY, Disp: 90 tablet, Rfl: 0    ondansetron (ZOFRAN-ODT) 8 MG TbDL, Take 1 tablet (8 mg total) by mouth every 12 (twelve) hours as needed., Disp: 20 tablet, Rfl: 1    colchicine 0.6 mg tablet, Take 1 tablet (0.6 mg total) by mouth 2 (two) times daily as needed., Disp: 9 tablet, Rfl: 11    dexamethasone (DECADRON) 4 MG Tab, Take one tablet twice a day days 2,3, and 4 of each chemotherapy cycle., Disp: 12 tablet, Rfl: 1  No current facility-administered medications for this visit.   Allergies  Review of patient's allergies indicates:  No Known Allergies  Review of Systems  Review of Systems   Constitutional: Positive for appetite  "change and fatigue. Negative for activity change, chills, fever and unexpected weight change.   HENT: Negative for congestion, hearing loss, nosebleeds, sinus pressure and trouble swallowing.    Eyes: Negative for pain, discharge, itching and visual disturbance.   Respiratory: Negative for cough, chest tightness and shortness of breath.    Cardiovascular: Negative for chest pain, palpitations and leg swelling.   Gastrointestinal: Positive for constipation. Negative for abdominal distention, abdominal pain, blood in stool, diarrhea, nausea, rectal pain and vomiting.   Endocrine: Negative for heat intolerance and polydipsia.   Genitourinary: Negative for difficulty urinating, dysuria, flank pain, frequency, hematuria and urgency.   Musculoskeletal: Negative for arthralgias, back pain and neck pain.   Skin: Negative for color change, pallor and rash.   Neurological: Negative for dizziness, numbness and headaches.   Hematological: Negative for adenopathy. Does not bruise/bleed easily.   Psychiatric/Behavioral: Negative for confusion and decreased concentration. The patient is not nervous/anxious.         Objective     Objective:     Vitals:    06/24/19 0819   BP: (!) 145/72   BP Location: Right arm   Patient Position: Sitting   BP Method: Large (Automatic)   Pulse: 69   Resp: 18   Temp: 98.2 °F (36.8 °C)   TempSrc: Oral   SpO2: 98%   Weight: (!) 184.2 kg (406 lb 1.4 oz)   Height: 6' 1.5" (1.867 m)   Body mass index is 52.85 kg/m².    Body surface area is 3.09 meters squared.  Physical Exam   Constitutional: He is oriented to person, place, and time. He appears well-developed and well-nourished. No distress.   HENT:   Head: Normocephalic and atraumatic.   Mouth/Throat: Oropharynx is clear and moist and mucous membranes are normal. No oral lesions.   Eyes: Conjunctivae and EOM are normal. Right eye exhibits no discharge. Left eye exhibits no discharge. No scleral icterus.   Cardiovascular: Normal rate, regular rhythm, S1 " normal, S2 normal and normal heart sounds.   Pulmonary/Chest: Effort normal and breath sounds normal. No respiratory distress. He has no wheezes. He has no rhonchi. He has no rales.   Right breast with nipple retraction and skin thickening stable.  Left mediport accessed.   Abdominal: Soft. Normal appearance and bowel sounds are normal. There is no tenderness.   Lymphadenopathy:     He has no cervical adenopathy.     He has no axillary adenopathy.        Right: No supraclavicular adenopathy present.        Left: No supraclavicular adenopathy present.   Neurological: He is alert and oriented to person, place, and time. He has normal strength.   Skin: Skin is warm, dry and intact. No pallor.   Psychiatric: His behavior is normal. Thought content normal.     Initial Breast exam (pre-chemo): Right breast with nipple retraction and skin thickening around nipple without well defined mass behind the skin thickening.    Labs and Imaging  Results for orders placed or performed in visit on 06/10/19   CBC Oncology   Result Value Ref Range    WBC 6.13 3.90 - 12.70 K/uL    RBC 4.56 (L) 4.60 - 6.20 M/uL    Hemoglobin 11.4 (L) 14.0 - 18.0 g/dL    Hematocrit 35.4 (L) 40.0 - 54.0 %    Mean Corpuscular Volume 78 (L) 82 - 98 fL    Mean Corpuscular Hemoglobin 25.0 (L) 27.0 - 31.0 pg    Mean Corpuscular Hemoglobin Conc 32.2 32.0 - 36.0 g/dL    RDW 14.4 11.5 - 14.5 %    Platelets 124 (L) 150 - 350 K/uL    MPV 11.2 9.2 - 12.9 fL    Gran # (ANC) 3.1 1.8 - 7.7 K/uL    Immature Grans (Abs) 0.51 (H) 0.00 - 0.04 K/uL   Comprehensive metabolic panel   Result Value Ref Range    Sodium 138 136 - 145 mmol/L    Potassium 3.9 3.5 - 5.1 mmol/L    Chloride 107 95 - 110 mmol/L    CO2 24 23 - 29 mmol/L    Glucose 126 (H) 70 - 110 mg/dL    BUN, Bld 17 6 - 20 mg/dL    Creatinine 1.2 0.5 - 1.4 mg/dL    Calcium 9.2 8.7 - 10.5 mg/dL    Total Protein 7.0 6.0 - 8.4 g/dL    Albumin 3.6 3.5 - 5.2 g/dL    Total Bilirubin 0.3 0.1 - 1.0 mg/dL    Alkaline  Phosphatase 67 55 - 135 U/L    AST 17 10 - 40 U/L    ALT 36 10 - 44 U/L    Anion Gap 7 (L) 8 - 16 mmol/L    eGFR if African American >60.0 >60 mL/min/1.73 m^2    eGFR if non African American >60.0 >60 mL/min/1.73 m^2   Iron and TIBC   Result Value Ref Range    Iron 88 45 - 160 ug/dL    Transferrin 224 200 - 375 mg/dL    TIBC 332 250 - 450 ug/dL    Saturated Iron 27 20 - 50 %   Ferritin   Result Value Ref Range    Ferritin 430 (H) 20.0 - 300.0 ng/mL       Assessment     Assessment:     Problem List Items Addressed This Visit        Psychiatric    Adjustment disorder, unspecified    Overview     Secondary to new breast cancer diagnosis.          Current Assessment & Plan     Following with Dr Nunez - missed first appt - being rescheduled,             Cardiac/Vascular    Essential hypertension    Overview     - Managed by PCP            Oncology    Malignant neoplasm involving both nipple and areola of right breast in male, estrogen receptor negative - Primary    Overview     1. Stage IB ([id number]) invasive ductal carcinoma with lobular features of right breast, retroareolar, ER neg, [place] neg, Her2 neg, Grade 2, Ki67 80%, diagnosed 5/2019   * Genetics negative   * 5/27/19 initiated neoadjuvant ddAC followed by weekly Taxol.          Current Assessment & Plan     - Cycle 3 ddAC. Tumor softening (initially and now stable). Will plan US after ddAC.   - Patient leaning towards bilateral mastectomy.   - MD in 2 and 4 weeks.          Relevant Orders    US Breast Right Complete    Microcytic anemia    Overview     - Microcytic anemia present since atleast 2017 without significant change.  - Will test with hemoglobin electrophoresis in future.            GI    Drug induced constipation    Overview     Secondary to chemotherapy               Plan:   Cycle 3 ddAC; remainder as above.   MD in 2 weeks.   In addition to chemo monitoring, I eval'd tumor response to chemotherapy.   Future Appointments   Date Time Provider Department  Center   6/24/2019 10:00 AM [facility]   6/25/2019 10:30 AM [facility]JECTION[facility]   7/8/2019  9:45 AM [facility]JE[facility]   7/8/2019 11:00 AM Richa Bahena MD [facility] HEM ONC [facility]   7/8/2019 11:30 AM [facility]   7/9/2019 10:30 AM INJECTION[facility]   9/20/2019  7:40 AM Kareem Arroyo MD [facility]       Distress Screening Results: Psychosocial Distress screening score of Distress Score: 0 noted and reviewed. No intervention indicated.

## 2019-06-14 NOTE — ASSESSMENT & PLAN NOTE
- Cycle 3 ddAC. Tumor softening (initially and now stable). Will plan US after ddAC.   - Patient leaning towards bilateral mastectomy.   - MD in 2 and 4 weeks.

## 2019-06-17 ENCOUNTER — TELEPHONE (OUTPATIENT)
Dept: INFUSION THERAPY | Facility: HOSPITAL | Age: 44
End: 2019-06-17

## 2019-06-17 ENCOUNTER — TELEPHONE (OUTPATIENT)
Dept: PSYCHIATRY | Facility: CLINIC | Age: 44
End: 2019-06-17

## 2019-06-17 NOTE — TELEPHONE ENCOUNTER
No show 6/17/19.  Spoke to patient by phone. Patient states he forgot about the visit, but will reschedule. GENNARO Sandoval, PhD

## 2019-06-20 ENCOUNTER — TELEPHONE (OUTPATIENT)
Dept: FAMILY MEDICINE | Facility: CLINIC | Age: 44
End: 2019-06-20

## 2019-06-20 DIAGNOSIS — I10 HYPERTENSION, UNSPECIFIED TYPE: ICD-10-CM

## 2019-06-20 RX ORDER — LOSARTAN POTASSIUM AND HYDROCHLOROTHIAZIDE 25; 100 MG/1; MG/1
TABLET ORAL
Qty: 90 TABLET | Refills: 0 | Status: SHIPPED | OUTPATIENT
Start: 2019-06-20 | End: 2019-10-24 | Stop reason: SDUPTHER

## 2019-06-20 RX ORDER — AMLODIPINE BESYLATE 10 MG/1
TABLET ORAL
Qty: 30 TABLET | Refills: 0 | Status: SHIPPED | OUTPATIENT
Start: 2019-06-20 | End: 2019-07-20 | Stop reason: SDUPTHER

## 2019-06-20 NOTE — TELEPHONE ENCOUNTER
Please try to get him in for PCP fu   I know he is undergoing cancer treatment - but needs PCP appt

## 2019-06-20 NOTE — TELEPHONE ENCOUNTER
I contacted the patient to speak to him about scheduling a appointment with his PCP. The appointment was scheduled for 9/20.

## 2019-06-24 ENCOUNTER — OFFICE VISIT (OUTPATIENT)
Dept: HEMATOLOGY/ONCOLOGY | Facility: CLINIC | Age: 44
End: 2019-06-24
Payer: MEDICAID

## 2019-06-24 ENCOUNTER — PATIENT MESSAGE (OUTPATIENT)
Dept: HEMATOLOGY/ONCOLOGY | Facility: CLINIC | Age: 44
End: 2019-06-24

## 2019-06-24 ENCOUNTER — INFUSION (OUTPATIENT)
Dept: INFUSION THERAPY | Facility: HOSPITAL | Age: 44
End: 2019-06-24
Attending: INTERNAL MEDICINE
Payer: MEDICAID

## 2019-06-24 VITALS
RESPIRATION RATE: 18 BRPM | WEIGHT: 315 LBS | HEART RATE: 69 BPM | SYSTOLIC BLOOD PRESSURE: 145 MMHG | OXYGEN SATURATION: 98 % | DIASTOLIC BLOOD PRESSURE: 72 MMHG | BODY MASS INDEX: 40.43 KG/M2 | HEIGHT: 74 IN | TEMPERATURE: 98 F

## 2019-06-24 VITALS
BODY MASS INDEX: 40.43 KG/M2 | SYSTOLIC BLOOD PRESSURE: 121 MMHG | DIASTOLIC BLOOD PRESSURE: 56 MMHG | RESPIRATION RATE: 18 BRPM | HEIGHT: 74 IN | TEMPERATURE: 99 F | HEART RATE: 68 BPM | WEIGHT: 315 LBS

## 2019-06-24 DIAGNOSIS — K59.03 DRUG INDUCED CONSTIPATION: ICD-10-CM

## 2019-06-24 DIAGNOSIS — D50.9 MICROCYTIC ANEMIA: ICD-10-CM

## 2019-06-24 DIAGNOSIS — F43.20 ADJUSTMENT DISORDER, UNSPECIFIED TYPE: ICD-10-CM

## 2019-06-24 DIAGNOSIS — I10 ESSENTIAL HYPERTENSION: ICD-10-CM

## 2019-06-24 DIAGNOSIS — C50.021 MALIGNANT NEOPLASM INVOLVING BOTH NIPPLE AND AREOLA OF RIGHT BREAST IN MALE, ESTROGEN RECEPTOR NEGATIVE: Primary | ICD-10-CM

## 2019-06-24 DIAGNOSIS — Z17.1 MALIGNANT NEOPLASM INVOLVING BOTH NIPPLE AND AREOLA OF RIGHT BREAST IN MALE, ESTROGEN RECEPTOR NEGATIVE: Primary | ICD-10-CM

## 2019-06-24 LAB
ALBUMIN SERPL BCP-MCNC: 3.7 G/DL (ref 3.5–5.2)
ALP SERPL-CCNC: 75 U/L (ref 55–135)
ALT SERPL W/O P-5'-P-CCNC: 35 U/L (ref 10–44)
ANION GAP SERPL CALC-SCNC: 8 MMOL/L (ref 8–16)
AST SERPL-CCNC: 16 U/L (ref 10–40)
BILIRUB SERPL-MCNC: 0.4 MG/DL (ref 0.1–1)
BUN SERPL-MCNC: 16 MG/DL (ref 6–20)
CALCIUM SERPL-MCNC: 9.3 MG/DL (ref 8.7–10.5)
CHLORIDE SERPL-SCNC: 108 MMOL/L (ref 95–110)
CO2 SERPL-SCNC: 25 MMOL/L (ref 23–29)
CREAT SERPL-MCNC: 1.2 MG/DL (ref 0.5–1.4)
ERYTHROCYTE [DISTWIDTH] IN BLOOD BY AUTOMATED COUNT: 15.6 % (ref 11.5–14.5)
EST. GFR  (AFRICAN AMERICAN): >60 ML/MIN/1.73 M^2
EST. GFR  (NON AFRICAN AMERICAN): >60 ML/MIN/1.73 M^2
GLUCOSE SERPL-MCNC: 104 MG/DL (ref 70–110)
HCT VFR BLD AUTO: 34.6 % (ref 40–54)
HGB BLD-MCNC: 10.9 G/DL (ref 14–18)
IMM GRANULOCYTES # BLD AUTO: 0.19 K/UL (ref 0–0.04)
MCH RBC QN AUTO: 25.5 PG (ref 27–31)
MCHC RBC AUTO-ENTMCNC: 31.5 G/DL (ref 32–36)
MCV RBC AUTO: 81 FL (ref 82–98)
NEUTROPHILS # BLD AUTO: 4.6 K/UL (ref 1.8–7.7)
PLATELET # BLD AUTO: 160 K/UL (ref 150–350)
PMV BLD AUTO: 10.4 FL (ref 9.2–12.9)
POTASSIUM SERPL-SCNC: 4.1 MMOL/L (ref 3.5–5.1)
PROT SERPL-MCNC: 7.2 G/DL (ref 6–8.4)
RBC # BLD AUTO: 4.28 M/UL (ref 4.6–6.2)
SODIUM SERPL-SCNC: 141 MMOL/L (ref 136–145)
WBC # BLD AUTO: 7.01 K/UL (ref 3.9–12.7)

## 2019-06-24 PROCEDURE — 99999 PR PBB SHADOW E&M-EST. PATIENT-LVL III: ICD-10-PCS | Mod: PBBFAC,,, | Performed by: INTERNAL MEDICINE

## 2019-06-24 PROCEDURE — A4216 STERILE WATER/SALINE, 10 ML: HCPCS | Performed by: INTERNAL MEDICINE

## 2019-06-24 PROCEDURE — 25000003 PHARM REV CODE 250: Performed by: INTERNAL MEDICINE

## 2019-06-24 PROCEDURE — 96413 CHEMO IV INFUSION 1 HR: CPT

## 2019-06-24 PROCEDURE — 99999 PR PBB SHADOW E&M-EST. PATIENT-LVL III: CPT | Mod: PBBFAC,,, | Performed by: INTERNAL MEDICINE

## 2019-06-24 PROCEDURE — 99215 PR OFFICE/OUTPT VISIT, EST, LEVL V, 40-54 MIN: ICD-10-PCS | Mod: S$PBB,,, | Performed by: INTERNAL MEDICINE

## 2019-06-24 PROCEDURE — 96367 TX/PROPH/DG ADDL SEQ IV INF: CPT

## 2019-06-24 PROCEDURE — 80053 COMPREHEN METABOLIC PANEL: CPT

## 2019-06-24 PROCEDURE — 99213 OFFICE O/P EST LOW 20 MIN: CPT | Mod: PBBFAC,25 | Performed by: INTERNAL MEDICINE

## 2019-06-24 PROCEDURE — 63600175 PHARM REV CODE 636 W HCPCS: Performed by: INTERNAL MEDICINE

## 2019-06-24 PROCEDURE — 96411 CHEMO IV PUSH ADDL DRUG: CPT

## 2019-06-24 PROCEDURE — 85027 COMPLETE CBC AUTOMATED: CPT

## 2019-06-24 PROCEDURE — 99215 OFFICE O/P EST HI 40 MIN: CPT | Mod: S$PBB,,, | Performed by: INTERNAL MEDICINE

## 2019-06-24 RX ORDER — DOXORUBICIN HYDROCHLORIDE 2 MG/ML
60 INJECTION, SOLUTION INTRAVENOUS
Status: CANCELLED | OUTPATIENT
Start: 2019-06-24

## 2019-06-24 RX ORDER — HEPARIN 100 UNIT/ML
500 SYRINGE INTRAVENOUS
Status: DISCONTINUED | OUTPATIENT
Start: 2019-06-24 | End: 2019-06-24 | Stop reason: HOSPADM

## 2019-06-24 RX ORDER — HEPARIN 100 UNIT/ML
500 SYRINGE INTRAVENOUS
Status: CANCELLED | OUTPATIENT
Start: 2019-06-24

## 2019-06-24 RX ORDER — HEPARIN 100 UNIT/ML
500 SYRINGE INTRAVENOUS
Status: COMPLETED | OUTPATIENT
Start: 2019-06-24 | End: 2019-06-24

## 2019-06-24 RX ORDER — DOXORUBICIN HYDROCHLORIDE 2 MG/ML
60 INJECTION, SOLUTION INTRAVENOUS
Status: COMPLETED | OUTPATIENT
Start: 2019-06-24 | End: 2019-06-24

## 2019-06-24 RX ORDER — SODIUM CHLORIDE 0.9 % (FLUSH) 0.9 %
10 SYRINGE (ML) INJECTION
Status: CANCELLED | OUTPATIENT
Start: 2019-06-24

## 2019-06-24 RX ORDER — SODIUM CHLORIDE 0.9 % (FLUSH) 0.9 %
10 SYRINGE (ML) INJECTION
Status: DISCONTINUED | OUTPATIENT
Start: 2019-06-24 | End: 2019-06-24 | Stop reason: HOSPADM

## 2019-06-24 RX ORDER — SODIUM CHLORIDE 0.9 % (FLUSH) 0.9 %
10 SYRINGE (ML) INJECTION
Status: COMPLETED | OUTPATIENT
Start: 2019-06-24 | End: 2019-06-24

## 2019-06-24 RX ADMIN — Medication 10 ML: at 09:06

## 2019-06-24 RX ADMIN — DOXORUBICIN HYDROCHLORIDE 186 MG: 2 INJECTION, SOLUTION INTRAVENOUS at 11:06

## 2019-06-24 RX ADMIN — Medication 10 ML: at 12:06

## 2019-06-24 RX ADMIN — HEPARIN 500 UNITS: 100 SYRINGE at 09:06

## 2019-06-24 RX ADMIN — SODIUM CHLORIDE: 0.9 INJECTION, SOLUTION INTRAVENOUS at 10:06

## 2019-06-24 RX ADMIN — PALONOSETRON HYDROCHLORIDE: 0.25 INJECTION, SOLUTION INTRAVENOUS at 10:06

## 2019-06-24 RX ADMIN — APREPITANT 130 MG: 130 INJECTION, EMULSION INTRAVENOUS at 10:06

## 2019-06-24 RX ADMIN — CYCLOPHOSPHAMIDE 1865 MG: 1 INJECTION, POWDER, FOR SOLUTION INTRAVENOUS; ORAL at 11:06

## 2019-06-24 RX ADMIN — HEPARIN 500 UNITS: 100 SYRINGE at 12:06

## 2019-06-24 NOTE — Clinical Note
- Keep scheduled appt. - Add right breast US same day as my appt 7/24 - doesn't have to be before my appt- Add MD appt 7/24 with cbc, cmp, first dose Taxol.

## 2019-06-24 NOTE — LETTER
June 24, 2019    Paul Li Jr.  [address] [facility] [address]         [facility] Hematology Oncology  [address]  [place] [address]  Phone: [phone] June 24, 2019     Patient: Paul Li Jr.   YOB: 1975   Date of Visit: 6/24/2019       To Whom It May Concern:    It is my medical opinion that Paul Li should remain out of work until further notice, at least for another 6 weeks and then we will re-evaluate.    If you have any questions or concerns, please don't hesitate to call.    Sincerely,        Richa Bahena MD

## 2019-06-24 NOTE — NURSING
PAC accessed and labs obtained and sent off. Left accessed for treatment today. No new complaints voiced. Pt amb off unit.

## 2019-06-24 NOTE — PLAN OF CARE
Problem: Adult Inpatient Plan of Care  Goal: Plan of Care Review  Outcome: Ongoing (interventions implemented as appropriate)  Patient tolerated a/c well today. No complaints at this time. Port + blood return present, flushed, hep locked and deaccessed. AVS given. Plan to rtc tomorrow for injection. Verbalized understanding to call MD for any questions/concerns. Discharged home, ambulated independently with wife at side.

## 2019-06-25 ENCOUNTER — INFUSION (OUTPATIENT)
Dept: INFUSION THERAPY | Facility: HOSPITAL | Age: 44
End: 2019-06-25
Attending: INTERNAL MEDICINE
Payer: MEDICAID

## 2019-06-25 DIAGNOSIS — C50.021 MALIGNANT NEOPLASM INVOLVING BOTH NIPPLE AND AREOLA OF RIGHT BREAST IN MALE, ESTROGEN RECEPTOR NEGATIVE: Primary | ICD-10-CM

## 2019-06-25 DIAGNOSIS — Z17.1 MALIGNANT NEOPLASM INVOLVING BOTH NIPPLE AND AREOLA OF RIGHT BREAST IN MALE, ESTROGEN RECEPTOR NEGATIVE: Primary | ICD-10-CM

## 2019-06-25 PROCEDURE — 96372 THER/PROPH/DIAG INJ SC/IM: CPT

## 2019-06-25 PROCEDURE — 63600175 PHARM REV CODE 636 W HCPCS: Mod: JG | Performed by: INTERNAL MEDICINE

## 2019-06-25 RX ADMIN — PEGFILGRASTIM-CBQV 6 MG: 6 INJECTION, SOLUTION SUBCUTANEOUS at 11:06

## 2019-06-25 NOTE — NURSING
Patient here for neulasta injection-denies any bone pain with last injection-complains of nausea-taking medication with good results-reviewed side effects of neulasta.

## 2019-06-29 LAB — INTEGRATED BRAC ANALYSIS: NORMAL

## 2019-07-03 NOTE — PROGRESS NOTES
History:     Reason For Consultation:   1. Stage IB ([id number]) triple negative invasive ductal carcinoma with lobular features of right breast, retroareolar, Grade 2, Ki67 80%, diagnosed 5/2019    Records Obtained: Records of the patients history including those obtained from the referring provider were reviewed and summarized in detail.    HPI:   Paul Li Jr. presents for follow up of breast cancer and cycle 4 ddAC with next day Neulasta. Continues with fatigue that limits his ability to work.   Complaints of darkening of hands  Complaints of right leg swelling - now persistent (used to be only occasional).   Occasional blood when wiping but constipation has improved with senna S. No nausea.     History: I reviewed, confirmed and updated history (past medical, social, family) as necessary.       Medications    Current Outpatient Medications:     amLODIPine (NORVASC) 10 MG tablet, TAKE 1 TABLET(10 MG) BY MOUTH EVERY DAY, Disp: 30 tablet, Rfl: 0    dexamethasone (DECADRON) 4 MG Tab, Take one tablet twice a day days 2,3, and 4 of each chemotherapy cycle., Disp: 12 tablet, Rfl: 1    HYDROcodone-acetaminophen (NORCO) 5-325 mg per tablet, Take 1 tablet by mouth every 6 (six) hours as needed for Pain., Disp: 8 tablet, Rfl: 0    ibuprofen (ADVIL,MOTRIN) 800 MG tablet, Take 800 mg by mouth 3 (three) times daily., Disp: , Rfl:     lidocaine-prilocaine (EMLA) cream, Apply to affected area 45 minutes before port access, Disp: 30 g, Rfl: 1    losartan-hydrochlorothiazide 100-25 mg (HYZAAR) 100-25 mg per tablet, TAKE 1 TABLET BY MOUTH EVERY DAY, Disp: 90 tablet, Rfl: 0    ondansetron (ZOFRAN-ODT) 8 MG TbDL, Take 1 tablet (8 mg total) by mouth every 12 (twelve) hours as needed., Disp: 20 tablet, Rfl: 1    colchicine 0.6 mg tablet, Take 1 tablet (0.6 mg total) by mouth 2 (two) times daily as needed., Disp: 9 tablet, Rfl: 11  No current facility-administered medications for this visit.     Facility-Administered  Medications Ordered in Other Visits:     alteplase injection 2 mg, 2 mg, Intra-Catheter, PRN, Richa Bahena MD    aprepitant (CINVANTI) 130 mg in sodium chloride 0.9% 148 mL infusion, 130 mg, Intravenous, 1 time in Clinic/HOD, Richa Bahena MD    cyclophosphamide (CYTOXAN) 600 mg/m2 = 1,865 mg in sodium chloride 0.9% 250 mL chemo infusion, 600 mg/m2 (Treatment Plan Recorded), Intravenous, 1 time in Clinic/HOD, Richa Bahena MD    DOXOrubicin chemo injection 186 mg, 60 mg/m2 (Treatment Plan Recorded), Intravenous, 1 time in Clinic/HOD, Richa Bahena MD    heparin, porcine (PF) 100 unit/mL injection flush 500 Units, 500 Units, Intravenous, PRN, Richa Bahena MD    palonosetron 0.25mg/dexamethasone 12mg IVPB, , Intravenous, 1 time in Clinic/[facility], Richa Bahena MD    sodium chloride 0.9% 250 mL flush bag, , Intravenous, 1 time in Clinic/HOD, Richa Bahena MD    sodium chloride 0.9% flush 10 mL, 10 mL, Intravenous, PRN, Richa Bahena MD  Allergies  Review of patient's allergies indicates:  No Known Allergies  Review of Systems  Review of Systems   Constitutional: Positive for fatigue. Negative for activity change, appetite change, chills, fever and unexpected weight change.   HENT: Negative for congestion, hearing loss, nosebleeds, sinus pressure and trouble swallowing.    Eyes: Negative for pain, discharge, itching and visual disturbance.   Respiratory: Negative for cough, chest tightness and shortness of breath.    Cardiovascular: Positive for leg swelling. Negative for chest pain and palpitations.   Gastrointestinal: Positive for blood in stool. Negative for abdominal distention, abdominal pain, constipation, diarrhea, nausea, rectal pain and vomiting.   Endocrine: Negative for heat intolerance and polydipsia.   Genitourinary: Negative for difficulty urinating, dysuria, flank pain, frequency, hematuria and urgency.   Musculoskeletal: Negative for arthralgias, back pain and neck pain.    Skin: Positive for color change. Negative for pallor and rash.   Neurological: Negative for dizziness, numbness and headaches.   Hematological: Negative for adenopathy. Does not bruise/bleed easily.   Psychiatric/Behavioral: Negative for confusion and decreased concentration. The patient is not nervous/anxious.         Objective     Objective:     Vitals:    07/08/19 1023   BP: (!) 150/74   BP Location: Left arm   Patient Position: Sitting   Pulse: 69   Resp: 20   Temp: 98.5 °F (36.9 °C)   TempSrc: Oral   Weight: (!) 184.7 kg (407 lb 3 oz)   Body mass index is 52.99 kg/m².    Body surface area is 3.09 meters squared.  Physical Exam   Constitutional: He is oriented to person, place, and time. He appears well-developed and well-nourished. No distress.   HENT:   Head: Normocephalic and atraumatic.   Mouth/Throat: Oropharynx is clear and moist and mucous membranes are normal. No oral lesions.   Eyes: Conjunctivae and EOM are normal. Right eye exhibits no discharge. Left eye exhibits no discharge. No scleral icterus.   Cardiovascular: Normal rate, regular rhythm, S1 normal, S2 normal and normal heart sounds.   Pulmonary/Chest: Effort normal and breath sounds normal. No respiratory distress. He has no wheezes. He has no rhonchi. He has no rales.   Right breast with nipple retraction and skin thickening improving/softening.  Left mediport accessed.   Abdominal: Soft. Normal appearance and bowel sounds are normal. There is no tenderness.   Musculoskeletal: Normal range of motion. He exhibits edema (right leg > left). He exhibits no deformity.   Lymphadenopathy:     He has no cervical adenopathy.     He has no axillary adenopathy.        Right: No supraclavicular adenopathy present.        Left: No supraclavicular adenopathy present.   Neurological: He is alert and oriented to person, place, and time. He has normal strength.   Skin: Skin is warm, dry and intact. No pallor.   Darkening on palms   Psychiatric: His behavior  is normal. Thought content normal.     Initial Breast exam (pre-chemo): Right breast with nipple retraction and skin thickening around nipple without well defined mass behind the skin thickening.     Labs and Imaging  Results for orders placed or performed in visit on 07/08/19   CBC Oncology   Result Value Ref Range    WBC 7.44 3.90 - 12.70 K/uL    RBC 4.05 (L) 4.60 - 6.20 M/uL    Hemoglobin 10.3 (L) 14.0 - 18.0 g/dL    Hematocrit 33.1 (L) 40.0 - 54.0 %    Mean Corpuscular Volume 82 82 - 98 fL    Mean Corpuscular Hemoglobin 25.4 (L) 27.0 - 31.0 pg    Mean Corpuscular Hemoglobin Conc 31.1 (L) 32.0 - 36.0 g/dL    RDW 16.8 (H) 11.5 - 14.5 %    Platelets 188 150 - 350 K/uL    MPV 10.9 9.2 - 12.9 fL    Gran # (ANC) 4.7 1.8 - 7.7 K/uL    Immature Grans (Abs) 0.64 (H) 0.00 - 0.04 K/uL   Comprehensive metabolic panel   Result Value Ref Range    Sodium 141 136 - 145 mmol/L    Potassium 4.0 3.5 - 5.1 mmol/L    Chloride 107 95 - 110 mmol/L    CO2 27 23 - 29 mmol/L    Glucose 106 70 - 110 mg/dL    BUN, Bld 11 6 - 20 mg/dL    Creatinine 1.0 0.5 - 1.4 mg/dL    Calcium 9.4 8.7 - 10.5 mg/dL    Total Protein 7.0 6.0 - 8.4 g/dL    Albumin 3.6 3.5 - 5.2 g/dL    Total Bilirubin 0.4 0.1 - 1.0 mg/dL    Alkaline Phosphatase 76 55 - 135 U/L    AST 16 10 - 40 U/L    ALT 37 10 - 44 U/L    Anion Gap 7 (L) 8 - 16 mmol/L    eGFR if African American >60.0 >60 mL/min/1.73 m^2    eGFR if non African American >60.0 >60 mL/min/1.73 m^2       Assessment     Assessment:   1. Stage IB ([id number]) invasive ductal carcinoma with lobular features of right breast, retroareolar, ER neg, [place] neg, Her2 neg, Grade 2, Ki67 80%, diagnosed 5/2019   * Genetics negative   * 5/27/19 initiated neoadjuvant ddAC followed by weekly Taxol.    * Cycle 4 ddAC with Neulasta. Tumor is improving. Tolerance is good.   * US in 2 weeks with start of weekly Taxol.     2. Microcytic anemia   * at baseline, but worsening with chemotherapy   * Monitoring.    3. Adjustment  disorder/depression.    * Dr Nunez    4. Drug induced constipation   * Continue senna S.     5. HTN per PCP    6. Right LE swelling.     * Doppler US of right leg.       Plan:   Cycle 4 ddAC today; RTC in 2 weeks for cycle 1 Taxol.   MD and US in 2 weeks.   Right LE US  In addition to chemo monitoring, I eval'd tumor response to chemotherapy and ordered right LE US    Future Appointments   Date Time Provider Department Center   7/8/2019 11:30 AM [facility] [place] [facility]   7/9/2019  7:30 AM Matthew Sandoval, PhD [facility] CAN PSY [place]   7/9/2019 10:30 AM INJECTION, [facility] INFUSION [facility] [place]   7/9/2019  4:15 PM [facility] OIC-US1 MASTER [facility]   7/24/2019 10:40 AM [facility] [id number] US [facility] M[facility] [address]   7/24/2019 12:30 PM INJECTION, [facility] INFUSION [facility] [place]   7/24/2019  1:30 PM Richa Bahena MD [facility] HEM ONC [place] [facility]   7/24/2019  2:00 PM [facility]MO [facility] [place]   7/31/2019 11:45 AM INJECTION, [facility] INFUSION [facility] [place]   7/31/2019  1:00 PM [facility] CHEMO [facility] [place]   8/7/2019 11:45 AM INJECTION, [facility] INFUSION [facility] [place]   8/7/2019  1:00 PM [facility]MO [facility] [place]   8/14/2019 11:45 AM INJECTION, [facility] INFUSION [facility] [place]   8/14/2019  1:00 PM [facility]MO [facility] [place]   8/21/2019 11:45 AM INJECTION, [facility] INFUSION [facility] [place]   8/21/2019  1:00 PM [facility]MO [facility] [place]   9/20/2019  7:40 AM Kareem Arroyo MD [facility]     Distress Screening Results: Psychosocial Distress screening score of Distress Score: 0 noted and reviewed. No intervention indicated.

## 2019-07-08 ENCOUNTER — INFUSION (OUTPATIENT)
Dept: INFUSION THERAPY | Facility: HOSPITAL | Age: 44
End: 2019-07-08
Attending: INTERNAL MEDICINE
Payer: MEDICAID

## 2019-07-08 ENCOUNTER — OFFICE VISIT (OUTPATIENT)
Dept: HEMATOLOGY/ONCOLOGY | Facility: CLINIC | Age: 44
End: 2019-07-08
Payer: MEDICAID

## 2019-07-08 VITALS
SYSTOLIC BLOOD PRESSURE: 150 MMHG | RESPIRATION RATE: 20 BRPM | WEIGHT: 315 LBS | HEART RATE: 69 BPM | BODY MASS INDEX: 52.99 KG/M2 | DIASTOLIC BLOOD PRESSURE: 74 MMHG | TEMPERATURE: 99 F

## 2019-07-08 VITALS
OXYGEN SATURATION: 96 % | TEMPERATURE: 98 F | SYSTOLIC BLOOD PRESSURE: 128 MMHG | WEIGHT: 315 LBS | BODY MASS INDEX: 40.43 KG/M2 | DIASTOLIC BLOOD PRESSURE: 64 MMHG | HEART RATE: 65 BPM | RESPIRATION RATE: 18 BRPM | HEIGHT: 74 IN

## 2019-07-08 DIAGNOSIS — Z17.1 MALIGNANT NEOPLASM INVOLVING BOTH NIPPLE AND AREOLA OF RIGHT BREAST IN MALE, ESTROGEN RECEPTOR NEGATIVE: Primary | ICD-10-CM

## 2019-07-08 DIAGNOSIS — C50.021 MALIGNANT NEOPLASM INVOLVING BOTH NIPPLE AND AREOLA OF RIGHT BREAST IN MALE, ESTROGEN RECEPTOR NEGATIVE: Primary | ICD-10-CM

## 2019-07-08 DIAGNOSIS — D50.9 MICROCYTIC ANEMIA: ICD-10-CM

## 2019-07-08 DIAGNOSIS — M79.89 SWELLING OF RIGHT LOWER EXTREMITY: ICD-10-CM

## 2019-07-08 DIAGNOSIS — I10 ESSENTIAL HYPERTENSION: ICD-10-CM

## 2019-07-08 DIAGNOSIS — F43.20 ADJUSTMENT DISORDER, UNSPECIFIED TYPE: ICD-10-CM

## 2019-07-08 LAB
ALBUMIN SERPL BCP-MCNC: 3.6 G/DL (ref 3.5–5.2)
ALP SERPL-CCNC: 76 U/L (ref 55–135)
ALT SERPL W/O P-5'-P-CCNC: 37 U/L (ref 10–44)
ANION GAP SERPL CALC-SCNC: 7 MMOL/L (ref 8–16)
AST SERPL-CCNC: 16 U/L (ref 10–40)
BILIRUB SERPL-MCNC: 0.4 MG/DL (ref 0.1–1)
BUN SERPL-MCNC: 11 MG/DL (ref 6–20)
CALCIUM SERPL-MCNC: 9.4 MG/DL (ref 8.7–10.5)
CHLORIDE SERPL-SCNC: 107 MMOL/L (ref 95–110)
CO2 SERPL-SCNC: 27 MMOL/L (ref 23–29)
CREAT SERPL-MCNC: 1 MG/DL (ref 0.5–1.4)
ERYTHROCYTE [DISTWIDTH] IN BLOOD BY AUTOMATED COUNT: 16.8 % (ref 11.5–14.5)
EST. GFR  (AFRICAN AMERICAN): >60 ML/MIN/1.73 M^2
EST. GFR  (NON AFRICAN AMERICAN): >60 ML/MIN/1.73 M^2
GLUCOSE SERPL-MCNC: 106 MG/DL (ref 70–110)
HCT VFR BLD AUTO: 33.1 % (ref 40–54)
HGB BLD-MCNC: 10.3 G/DL (ref 14–18)
IMM GRANULOCYTES # BLD AUTO: 0.64 K/UL (ref 0–0.04)
MCH RBC QN AUTO: 25.4 PG (ref 27–31)
MCHC RBC AUTO-ENTMCNC: 31.1 G/DL (ref 32–36)
MCV RBC AUTO: 82 FL (ref 82–98)
NEUTROPHILS # BLD AUTO: 4.7 K/UL (ref 1.8–7.7)
PLATELET # BLD AUTO: 188 K/UL (ref 150–350)
PMV BLD AUTO: 10.9 FL (ref 9.2–12.9)
POTASSIUM SERPL-SCNC: 4 MMOL/L (ref 3.5–5.1)
PROT SERPL-MCNC: 7 G/DL (ref 6–8.4)
RBC # BLD AUTO: 4.05 M/UL (ref 4.6–6.2)
SODIUM SERPL-SCNC: 141 MMOL/L (ref 136–145)
WBC # BLD AUTO: 7.44 K/UL (ref 3.9–12.7)

## 2019-07-08 PROCEDURE — 99213 OFFICE O/P EST LOW 20 MIN: CPT | Mod: PBBFAC,25 | Performed by: INTERNAL MEDICINE

## 2019-07-08 PROCEDURE — 25000003 PHARM REV CODE 250: Performed by: INTERNAL MEDICINE

## 2019-07-08 PROCEDURE — 96413 CHEMO IV INFUSION 1 HR: CPT

## 2019-07-08 PROCEDURE — 99215 OFFICE O/P EST HI 40 MIN: CPT | Mod: S$PBB,,, | Performed by: INTERNAL MEDICINE

## 2019-07-08 PROCEDURE — 63600175 PHARM REV CODE 636 W HCPCS: Performed by: INTERNAL MEDICINE

## 2019-07-08 PROCEDURE — 96411 CHEMO IV PUSH ADDL DRUG: CPT

## 2019-07-08 PROCEDURE — 80053 COMPREHEN METABOLIC PANEL: CPT

## 2019-07-08 PROCEDURE — A4216 STERILE WATER/SALINE, 10 ML: HCPCS | Performed by: INTERNAL MEDICINE

## 2019-07-08 PROCEDURE — 99215 PR OFFICE/OUTPT VISIT, EST, LEVL V, 40-54 MIN: ICD-10-PCS | Mod: S$PBB,,, | Performed by: INTERNAL MEDICINE

## 2019-07-08 PROCEDURE — 99999 PR PBB SHADOW E&M-EST. PATIENT-LVL III: ICD-10-PCS | Mod: PBBFAC,,, | Performed by: INTERNAL MEDICINE

## 2019-07-08 PROCEDURE — 85027 COMPLETE CBC AUTOMATED: CPT

## 2019-07-08 PROCEDURE — 99999 PR PBB SHADOW E&M-EST. PATIENT-LVL III: CPT | Mod: PBBFAC,,, | Performed by: INTERNAL MEDICINE

## 2019-07-08 PROCEDURE — 96367 TX/PROPH/DG ADDL SEQ IV INF: CPT

## 2019-07-08 RX ORDER — SODIUM CHLORIDE 0.9 % (FLUSH) 0.9 %
10 SYRINGE (ML) INJECTION
Status: COMPLETED | OUTPATIENT
Start: 2019-07-08 | End: 2019-07-08

## 2019-07-08 RX ORDER — HEPARIN 100 UNIT/ML
500 SYRINGE INTRAVENOUS
Status: CANCELLED | OUTPATIENT
Start: 2019-07-08

## 2019-07-08 RX ORDER — DOXORUBICIN HYDROCHLORIDE 2 MG/ML
60 INJECTION, SOLUTION INTRAVENOUS
Status: CANCELLED | OUTPATIENT
Start: 2019-07-08

## 2019-07-08 RX ORDER — HEPARIN 100 UNIT/ML
500 SYRINGE INTRAVENOUS
Status: DISCONTINUED | OUTPATIENT
Start: 2019-07-08 | End: 2019-07-08 | Stop reason: HOSPADM

## 2019-07-08 RX ORDER — SODIUM CHLORIDE 0.9 % (FLUSH) 0.9 %
10 SYRINGE (ML) INJECTION
Status: CANCELLED | OUTPATIENT
Start: 2019-07-08

## 2019-07-08 RX ORDER — DOXORUBICIN HYDROCHLORIDE 2 MG/ML
60 INJECTION, SOLUTION INTRAVENOUS
Status: COMPLETED | OUTPATIENT
Start: 2019-07-08 | End: 2019-07-08

## 2019-07-08 RX ORDER — SODIUM CHLORIDE 0.9 % (FLUSH) 0.9 %
10 SYRINGE (ML) INJECTION
Status: DISCONTINUED | OUTPATIENT
Start: 2019-07-08 | End: 2019-07-08 | Stop reason: HOSPADM

## 2019-07-08 RX ORDER — HEPARIN 100 UNIT/ML
500 SYRINGE INTRAVENOUS
Status: COMPLETED | OUTPATIENT
Start: 2019-07-08 | End: 2019-07-08

## 2019-07-08 RX ADMIN — PALONOSETRON: 0.05 INJECTION, SOLUTION INTRAVENOUS at 11:07

## 2019-07-08 RX ADMIN — HEPARIN 500 UNITS: 100 SYRINGE at 09:07

## 2019-07-08 RX ADMIN — APREPITANT 130 MG: 130 INJECTION, EMULSION INTRAVENOUS at 11:07

## 2019-07-08 RX ADMIN — HEPARIN 500 UNITS: 100 SYRINGE at 01:07

## 2019-07-08 RX ADMIN — DOXORUBICIN HYDROCHLORIDE 186 MG: 2 INJECTION, SOLUTION INTRAVENOUS at 12:07

## 2019-07-08 RX ADMIN — SODIUM CHLORIDE: 9 INJECTION, SOLUTION INTRAVENOUS at 11:07

## 2019-07-08 RX ADMIN — CYCLOPHOSPHAMIDE 1865 MG: 1 INJECTION, POWDER, FOR SOLUTION INTRAVENOUS; ORAL at 12:07

## 2019-07-08 RX ADMIN — Medication 10 ML: at 09:07

## 2019-07-08 NOTE — PLAN OF CARE
Problem: Adult Inpatient Plan of Care  Goal: Plan of Care Review  Outcome: Ongoing (interventions implemented as appropriate)  Pt tolerated AC well, with no complications or s/s of adverse reaction. VS stable throughout treatment. Left chest port positive for blood return, flushed with normal saline and heparin and de-accessed prior to discharge. Site dressed with band-aid. RTC tomorrow 7/9/19 for Udencya injection, pt and wife verbalized understanding. Pt discharged with no distress noted, ambulating independently, accompanied by wife. AVS printed and given to pt.

## 2019-07-09 ENCOUNTER — HOSPITAL ENCOUNTER (OUTPATIENT)
Dept: RADIOLOGY | Facility: HOSPITAL | Age: 44
Discharge: HOME | End: 2019-07-09
Attending: INTERNAL MEDICINE
Payer: MEDICAID

## 2019-07-09 ENCOUNTER — OFFICE VISIT (OUTPATIENT)
Dept: PSYCHIATRY | Facility: CLINIC | Age: 44
End: 2019-07-09
Payer: MEDICAID

## 2019-07-09 ENCOUNTER — INFUSION (OUTPATIENT)
Dept: INFUSION THERAPY | Facility: HOSPITAL | Age: 44
End: 2019-07-09
Attending: INTERNAL MEDICINE
Payer: MEDICAID

## 2019-07-09 DIAGNOSIS — Z17.1 MALIGNANT NEOPLASM INVOLVING BOTH NIPPLE AND AREOLA OF RIGHT BREAST IN MALE, ESTROGEN RECEPTOR NEGATIVE: Primary | ICD-10-CM

## 2019-07-09 DIAGNOSIS — M79.89 SWELLING OF RIGHT LOWER EXTREMITY: ICD-10-CM

## 2019-07-09 DIAGNOSIS — F43.23 ADJUSTMENT DISORDER WITH MIXED ANXIETY AND DEPRESSED MOOD: Primary | ICD-10-CM

## 2019-07-09 DIAGNOSIS — C50.021 MALIGNANT NEOPLASM INVOLVING BOTH NIPPLE AND AREOLA OF RIGHT BREAST IN MALE, ESTROGEN RECEPTOR NEGATIVE: Primary | ICD-10-CM

## 2019-07-09 PROCEDURE — 99999 PR PBB SHADOW E&M-EST. PATIENT-LVL II: ICD-10-PCS | Mod: PBBFAC,,, | Performed by: PSYCHOLOGIST

## 2019-07-09 PROCEDURE — 96372 THER/PROPH/DIAG INJ SC/IM: CPT

## 2019-07-09 PROCEDURE — 93971 EXTREMITY STUDY: CPT | Mod: 26,RT,, | Performed by: RADIOLOGY

## 2019-07-09 PROCEDURE — 90791 PR PSYCHIATRIC DIAGNOSTIC EVALUATION: ICD-10-PCS | Mod: HP,HB,, | Performed by: PSYCHOLOGIST

## 2019-07-09 PROCEDURE — 99999 PR PBB SHADOW E&M-EST. PATIENT-LVL II: CPT | Mod: PBBFAC,,, | Performed by: PSYCHOLOGIST

## 2019-07-09 PROCEDURE — 99212 OFFICE O/P EST SF 10 MIN: CPT | Mod: PBBFAC,25 | Performed by: PSYCHOLOGIST

## 2019-07-09 PROCEDURE — 93971 US LOWER EXTREMITY VEINS RIGHT: ICD-10-PCS | Mod: 26,RT,, | Performed by: RADIOLOGY

## 2019-07-09 PROCEDURE — 63600175 PHARM REV CODE 636 W HCPCS: Mod: JG | Performed by: INTERNAL MEDICINE

## 2019-07-09 PROCEDURE — 90791 PSYCH DIAGNOSTIC EVALUATION: CPT | Mod: HP,HB,, | Performed by: PSYCHOLOGIST

## 2019-07-09 PROCEDURE — 93971 EXTREMITY STUDY: CPT | Mod: TC,RT

## 2019-07-09 RX ADMIN — PEGFILGRASTIM-CBQV 6 MG: 6 INJECTION, SOLUTION SUBCUTANEOUS at 08:07

## 2019-07-09 NOTE — LETTER
July 9, 2019        Richa Bahena MD  [address] [facility] [address]             [facility]  [address] [facility] [address]  Phone: [phone]  Fax: [phone]   Patient: Paul Li Jr.   MR Number: [id number]   YOB: 1975   Date of Visit: 7/9/2019       Dear Dr. Bahena:    Thank you for referring Paul Li to me for evaluation. Below are the relevant portions of my assessment and plan of care.    Paul Li Jr. is a 43 y.o. male referred by Dr. Bahena for psychological evaluation and treatment.  Mr. Li is struggling to cope with his diagnosis/treatment superimposed upon grief over his wife's loss of a 6-month pregnancy in May 2019 and financial strain related to her illness and his illness.  He is interested in CBT to address depression/anxiety/insomnia and will follow up with me for that purpose. He is also concerned about his wife's coping. Resources for her care were discussed.     If you have questions, please do not hesitate to call me. I look forward to following Paul along with you.    Sincerely,      Matthew Sandoval, PhD           CC  No Recipients
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
## 2019-07-09 NOTE — NURSING
Patient here for udenyca injection-denies any side effects from chemo-reviewed side effects of udenyca-states has never had the bone pain-tolerated well.

## 2019-07-20 ENCOUNTER — TELEPHONE (OUTPATIENT)
Dept: FAMILY MEDICINE | Facility: CLINIC | Age: 44
End: 2019-07-20

## 2019-07-20 DIAGNOSIS — I10 HYPERTENSION, UNSPECIFIED TYPE: ICD-10-CM

## 2019-07-21 RX ORDER — AMLODIPINE BESYLATE 10 MG/1
TABLET ORAL
Qty: 30 TABLET | Refills: 0 | Status: SHIPPED | OUTPATIENT
Start: 2019-07-21 | End: 2019-08-19 | Stop reason: SDUPTHER

## 2019-07-21 NOTE — TELEPHONE ENCOUNTER
I will refill but he needs to make an appt with Dr Arroyo for fu. I know he is going through a lot with treatment.

## 2019-07-23 ENCOUNTER — OFFICE VISIT (OUTPATIENT)
Dept: PSYCHIATRY | Facility: CLINIC | Age: 44
End: 2019-07-23
Payer: MEDICAID

## 2019-07-23 DIAGNOSIS — F43.23 ADJUSTMENT DISORDER WITH MIXED ANXIETY AND DEPRESSED MOOD: Primary | ICD-10-CM

## 2019-07-23 DIAGNOSIS — F51.04 PSYCHOPHYSIOLOGICAL INSOMNIA: ICD-10-CM

## 2019-07-23 PROCEDURE — 99999 PR PBB SHADOW E&M-EST. PATIENT-LVL II: CPT | Mod: PBBFAC,,, | Performed by: PSYCHOLOGIST

## 2019-07-23 PROCEDURE — 90834 PSYTX W PT 45 MINUTES: CPT | Mod: HB,HP,, | Performed by: PSYCHOLOGIST

## 2019-07-23 PROCEDURE — 99999 PR PBB SHADOW E&M-EST. PATIENT-LVL II: ICD-10-PCS | Mod: PBBFAC,,, | Performed by: PSYCHOLOGIST

## 2019-07-23 PROCEDURE — 90834 PR PSYCHOTHERAPY W/PATIENT, 45 MIN: ICD-10-PCS | Mod: HB,HP,, | Performed by: PSYCHOLOGIST

## 2019-07-23 PROCEDURE — 99212 OFFICE O/P EST SF 10 MIN: CPT | Mod: PBBFAC | Performed by: PSYCHOLOGIST

## 2019-07-23 NOTE — PROGRESS NOTES
History:     Reason For Consultation:   1. Stage IB ([id number]) triple negative invasive ductal carcinoma with lobular features of right breast, retroareolar, Grade 2, Ki67 80%, diagnosed 5/2019    Records Obtained: Records of the patients history including those obtained from the referring provider were reviewed and summarized in detail.    HPI:   Paul Li Jr. presents for follow up of breast cancer and cycle 1 weekly Taxol.   Fatigue is stable. Had US this morning with nice response.   Discussed with Dr Nunez who has recommended treatment for insomnia. He describes insomnia as progressive with diagnosis of breast cancer.     History: I reviewed, confirmed and updated history (past medical, social, family) as necessary.     Medications    Current Outpatient Medications:     amLODIPine (NORVASC) 10 MG tablet, TAKE 1 TABLET(10 MG) BY MOUTH EVERY DAY, Disp: 30 tablet, Rfl: 0    dexamethasone (DECADRON) 4 MG Tab, Take one tablet twice a day days 2,3, and 4 of each chemotherapy cycle., Disp: 12 tablet, Rfl: 1    HYDROcodone-acetaminophen (NORCO) 5-325 mg per tablet, Take 1 tablet by mouth every 6 (six) hours as needed for Pain., Disp: 8 tablet, Rfl: 0    ibuprofen (ADVIL,MOTRIN) 800 MG tablet, Take 800 mg by mouth 3 (three) times daily., Disp: , Rfl:     lidocaine-prilocaine (EMLA) cream, Apply to affected area 45 minutes before port access, Disp: 30 g, Rfl: 1    losartan-hydrochlorothiazide 100-25 mg (HYZAAR) 100-25 mg per tablet, TAKE 1 TABLET BY MOUTH EVERY DAY, Disp: 90 tablet, Rfl: 0    ondansetron (ZOFRAN-ODT) 8 MG TbDL, Take 1 tablet (8 mg total) by mouth every 12 (twelve) hours as needed., Disp: 20 tablet, Rfl: 1    colchicine 0.6 mg tablet, Take 1 tablet (0.6 mg total) by mouth 2 (two) times daily as needed., Disp: 9 tablet, Rfl: 11  No current facility-administered medications for this visit.   Allergies  Review of patient's allergies indicates:  No Known Allergies  Review of Systems  Review of  "Systems   Constitutional: Positive for fatigue. Negative for activity change, appetite change, chills, fever and unexpected weight change.   HENT: Negative for congestion, hearing loss, nosebleeds, sinus pressure and trouble swallowing.    Eyes: Negative for pain, discharge, itching and visual disturbance.   Respiratory: Negative for cough, chest tightness and shortness of breath.    Cardiovascular: Negative for chest pain, palpitations and leg swelling.   Gastrointestinal: Negative for abdominal distention, abdominal pain, blood in stool, constipation, diarrhea, nausea, rectal pain and vomiting.   Endocrine: Negative for heat intolerance and polydipsia.   Genitourinary: Negative for difficulty urinating, dysuria, flank pain, frequency, hematuria and urgency.   Musculoskeletal: Negative for arthralgias, back pain and neck pain.   Skin: Positive for color change. Negative for pallor and rash.   Neurological: Negative for dizziness, numbness and headaches.   Hematological: Negative for adenopathy. Does not bruise/bleed easily.   Psychiatric/Behavioral: Positive for sleep disturbance. Negative for confusion and decreased concentration. The patient is not nervous/anxious.         Objective     Objective:     Vitals:    07/24/19 1311   BP: (!) 141/78   BP Location: Left arm   Patient Position: Sitting   BP Method: Large (Automatic)   Pulse: 78   Resp: 18   Temp: 98.1 °F (36.7 °C)   TempSrc: Oral   SpO2: 96%   Weight: (!) 187.3 kg (412 lb 14.8 oz)   Height: 6' 1.5" (1.867 m)   Body mass index is 53.74 kg/m².    Body surface area is 3.12 meters squared.  Physical Exam   Constitutional: He is oriented to person, place, and time. He appears well-developed and well-nourished. No distress.   HENT:   Head: Normocephalic and atraumatic.   Mouth/Throat: Oropharynx is clear and moist and mucous membranes are normal. No oral lesions.   Eyes: Conjunctivae and EOM are normal. Right eye exhibits no discharge. Left eye exhibits no " discharge. No scleral icterus.   Cardiovascular: Normal rate, regular rhythm, S1 normal, S2 normal and normal heart sounds.   Pulmonary/Chest: Effort normal and breath sounds normal. No respiratory distress. He has no wheezes. He has no rhonchi. He has no rales.   Right breast with nipple retraction and skin thickening dramatically improved   Left mediport accessed.   Abdominal: Soft. Normal appearance and bowel sounds are normal. There is no tenderness.   Musculoskeletal: Normal range of motion. He exhibits edema. He exhibits no deformity.   Lymphadenopathy:     He has no cervical adenopathy.     He has no axillary adenopathy.        Right: No supraclavicular adenopathy present.        Left: No supraclavicular adenopathy present.   Neurological: He is alert and oriented to person, place, and time. He has normal strength.   Skin: Skin is warm, dry and intact. No pallor.   Darkening on palms   Psychiatric: His behavior is normal. Thought content normal.     Initial Breast exam (pre-chemo): Right breast with nipple retraction and skin thickening around nipple without well defined mass behind the skin thickening.     Labs and Imaging  Results for orders placed or performed in visit on 07/08/19   CBC Oncology   Result Value Ref Range    WBC 7.44 3.90 - 12.70 K/uL    RBC 4.05 (L) 4.60 - 6.20 M/uL    Hemoglobin 10.3 (L) 14.0 - 18.0 g/dL    Hematocrit 33.1 (L) 40.0 - 54.0 %    Mean Corpuscular Volume 82 82 - 98 fL    Mean Corpuscular Hemoglobin 25.4 (L) 27.0 - 31.0 pg    Mean Corpuscular Hemoglobin Conc 31.1 (L) 32.0 - 36.0 g/dL    RDW 16.8 (H) 11.5 - 14.5 %    Platelets 188 150 - 350 K/uL    MPV 10.9 9.2 - 12.9 fL    Gran # (ANC) 4.7 1.8 - 7.7 K/uL    Immature Grans (Abs) 0.64 (H) 0.00 - 0.04 K/uL   Comprehensive metabolic panel   Result Value Ref Range    Sodium 141 136 - 145 mmol/L    Potassium 4.0 3.5 - 5.1 mmol/L    Chloride 107 95 - 110 mmol/L    CO2 27 23 - 29 mmol/L    Glucose 106 70 - 110 mg/dL    BUN, Bld 11 6 -  20 mg/dL    Creatinine 1.0 0.5 - 1.4 mg/dL    Calcium 9.4 8.7 - 10.5 mg/dL    Total Protein 7.0 6.0 - 8.4 g/dL    Albumin 3.6 3.5 - 5.2 g/dL    Total Bilirubin 0.4 0.1 - 1.0 mg/dL    Alkaline Phosphatase 76 55 - 135 U/L    AST 16 10 - 40 U/L    ALT 37 10 - 44 U/L    Anion Gap 7 (L) 8 - 16 mmol/L    eGFR if African American >60.0 >60 mL/min/1.73 m^2    eGFR if non African American >60.0 >60 mL/min/1.73 m^2     US Breast Right Limited  Narrative: Result:   US Breast Right Limited     History:  Patient is 43 y.o. and is seen to assess response to chemotherapy for   known right breast cancer.    Films Compared:  Compared to: 05/02/2019 US Breast Biopsy with Imaging 1st site Right,   05/02/2019 Mammo Digital Diagnostic Post Procedure_Mammo Guide_Clip_Needle   Loc only, 05/01/2019 Mammo Digital Diagnostic Bilat w/ Paul, and   05/01/2019 US Breast Right Limited     Findings:     There is a 10 mm x 9 mm x 7 mm irregularly shaped mass with indistinct   margins seen in the retroareolar region of the right breast in the   anterior depth. Compared to the previous study, the mass decreased in   size. No right axillary adenopathy is seen.   Impression: Right  Mass: Right breast 10 mm x 9 mm x 7 mm mass at the retroareolar anterior   position has decreased in size, consistent with response to therapy.   Assessment: 6 - Known biopsy, proven malignancy.     BI-RADS Category:   Right: 6 - Known Biopsy-Proven Malignancy  Overall: 6 - Known Biopsy-Proven Malignancy     Recommendation:  Continued oncology and surgical management.       Assessment     Assessment:   1. Stage IB ([id number]) invasive ductal carcinoma with lobular features of right breast, retroareolar, ER neg, DE neg, Her2 neg, Grade 2, Ki67 80%, diagnosed 5/2019   * Genetics negative   * 5/27/19 initiated neoadjuvant ddAC followed by weekly Taxol.    * Cycle 1 neoadjuvant weekly Taxol. Tumor is shrinking. Tolerance is good.   * US after ddAC showing improving disease    2.  Microcytic anemia   * at baseline, but worsening with chemotherapy   * Monitoring.    3. Adjustment disorder/depression.    * Dr Nunez    4. Drug induced constipation   * Continue senna S.     5. HTN per PCP    6. Right LE swelling.     * Doppler US negative for DVT    7. Insomnia   * Tried melatonin without success   * Try Restoril 15 mg nightly for insomnia.      Plan:   Cycle 1 neoadjuvant Taxol. US with improvement.   Restoril for insomnia  Repeat Echo since completed anthracycline  MD in 1, 3 weeks and 5 weeks (patient desires frequent follow ups); continue weekly Taxol.     In addition to chemo monitoring, I trena'd tumor response to chemotherapy, reviewed US and ordered echo.     Future Appointments   Date Time Provider Department Center   7/24/2019  1:30 PM Richa Bahena MD [facility] HEM ONC [place]   7/24/2019  2:00 PM [facility] [place]   7/31/2019 11:45 AM INJECTION, [facility] [place]   7/31/2019  1:00 PM [facility] [place]   8/7/2019  9:30 AM Matthew Sandoval, PhD [facility] CAN PSY [place]   8/7/2019 11:45 AM INJECTION, [facility] [place]   8/7/2019  1:00 PM [facility] [place]   8/14/2019 11:45 AM INJECTION, [facility] [place]   8/14/2019  1:00 PM [facility] [place]   8/21/2019 11:45 AM INJECTION, [facility] [place]   8/21/2019  1:00 PM [facility] [place]   9/20/2019  7:40 AM Kareem Arroyo MD [facility]     Distress Screening Results: Psychosocial Distress screening score of Distress Score: 0 noted and reviewed. No intervention indicated.

## 2019-07-23 NOTE — PROGRESS NOTES
PSYCHO-ONCOLOGY NOTE/ Individual Psychotherapy     Date: 7/23/2019   Site:  [facility]        Therapeutic Intervention: Met with patient.  Outpatient - Behavior modifying psychotherapy 45 min - CPT code 69601      Patient was last seen by me on 7/9/2019    Problem list  Patient Active Problem List   Diagnosis    Essential hypertension    Gout    Malignant neoplasm involving both nipple and areola of right breast in male, estrogen receptor negative    Microcytic anemia    Morbid obesity with BMI of 50.0-59.9, adult    Adjustment disorder with mixed anxiety and depressed mood    Hydrocele    Drug induced constipation    Psychophysiological insomnia       Chief complaint/reason for encounter: depression and anxiety   Met with patient to evaluate psychosocial adaptation to diagnosis/treatment/survivorship of breast cancer    Current Medications  Current Outpatient Medications   Medication    amLODIPine (NORVASC) 10 MG tablet    colchicine 0.6 mg tablet    dexamethasone (DECADRON) 4 MG Tab    HYDROcodone-acetaminophen (NORCO) 5-325 mg per tablet    ibuprofen (ADVIL,MOTRIN) 800 MG tablet    lidocaine-prilocaine (EMLA) cream    losartan-hydrochlorothiazide 100-25 mg (HYZAAR) 100-25 mg per tablet    ondansetron (ZOFRAN-ODT) 8 MG TbDL     No current facility-administered medications for this visit.        Objective:  Paul Li Jr. arrived promptly for the session.  Mr. Li was independently ambulatory at the time of session. The patient was fully cooperative throughout the session.  Appearance: age appropriate, casually  dressed, well groomed  Behavior/Cooperation: friendly and cooperative  Speech: normal in rate, volume, and tone and appropriate quality, quantity and organization of sentences  Mood: anxious  Affect: mood congruent, full range and appropriate  Thought Process: goal-directed, logical  Thought Content: normal,  No delusions or paranoia; did not appear to be responding to internal  "stimuli during the session  Orientation: grossly intact  Memory: Grossly intact  Attention Span/Concentration: Attends to session without distraction; reports no difficulty  Fund of Knowledge: average  Estimate of Intelligence: average from verbal skills and history  Cognition: grossly intact  Insight: patient has awareness of illness; good insight into own behavior and behavior of others  Judgment: the patient's behavior is adequate to circumstances    Interval history and content of current session: Patient discussed events and activities since the time of last visit.  Discussed current adaptation to disease and treatment status and family's adaptation to disease and treatment status. Reports to be coping with moderate difficulty. He has been most bothered during the past few weeks by sexual dysfunction. Discussed impact of sexual changes on self-image. Patient is using protective strategies when engaging in intercourse. Discussed worries about effectiveness of chemotherapy- imaging tomorrow.  His sons were all in town last weekend. Discussed their response to his illness and his disappointment at not being able to see them play football this year.  Evaluated cognitive response, paying particular attention to negative intrusive thoughts of a persistent and detrimental nature. Thoughts of this type are in evidence with moderate distress. Provided cognitive behavioral therapy to address negative cognitions. Identified and evaluated psychosocial and environmental stressors secondary to diagnosis and treatment. Patient very concerned about finances- even has considered delaying treatment to go back to work. Examined proactive behaviors that may be implemented to minimize or ameliorate psychosocial stressors secondary to diagnosis and treatment. Alternative sources of income explored.     Patient is walking more, doing regular meditation.  Significant sleep onset insomnia, even when not taking decadron; "mind racing " "all night"; restless sleep; averaging 4 hours of disrupted sleep/night. Patient will discuss with Dr. Bahena.     Risk parameters:   Patient reports no suicidal ideation  Patient reports no homicidal ideation  Patient reports no self-injurious behavior  Patient reports no violent behavior   Safety needs:  None at this time      Verbal deficits: None     Patient's response to intervention:The patient's response to intervention is accepting.     Progress toward goals and other mental status changes:  The patient's progress toward goals is fair .      Progress to date:Progress as Expected      Goals from last visit: Met      Patient reported outcomes:  Distress Thermometer:   Distress Score    Distress Score: 4        Practical Problems Physical Problems                                                   Family Problems                                         Emotional Problems                                                         Spiritual/Religions Concerns               Other Problems             PHQ-9=5; 8 at intake    BILL-7=3; 8 at intake      Client Strengths: verbal, intelligent, successful, good social support, good insight, commitment to wellness, strong guadalupe, strong cultural traditions     Diagnosis:     ICD-10-CM ICD-9-CM   1. Adjustment disorder with mixed anxiety and depressed mood F43.23 309.28   2. Psychophysiological insomnia F51.04 307.42       Treatment Plan:individual psychotherapy and medication management by physician  · Target symptoms: depression, anxiety , sleep  · Why chosen therapy is appropriate versus another modality: relevant to diagnosis, patient responds to this modality, evidence based practice  · Outcome monitoring methods: self-report, checklist/rating scale  · Therapeutic intervention type: behavior modifying psychotherapy, supportive psychotherapy  · Prognosis: Good      Behavioral goals:    Exercise: brief walking, as tolerated to combat fatigue   Stress management:  " Meditation/breathing exercises daily   Social engagement:   Nutrition:   Smoking Cessation:   Therapy:    Return to clinic: 2 weeks     Length of Service (minutes direct face-to-face contact): 45    Matthew Sandoval, PhD  [place] License #[id number]

## 2019-07-24 ENCOUNTER — OFFICE VISIT (OUTPATIENT)
Dept: HEMATOLOGY/ONCOLOGY | Facility: CLINIC | Age: 44
End: 2019-07-24
Payer: MEDICAID

## 2019-07-24 ENCOUNTER — INFUSION (OUTPATIENT)
Dept: INFUSION THERAPY | Facility: HOSPITAL | Age: 44
End: 2019-07-24
Attending: INTERNAL MEDICINE
Payer: MEDICAID

## 2019-07-24 ENCOUNTER — HOSPITAL ENCOUNTER (OUTPATIENT)
Dept: RADIOLOGY | Facility: HOSPITAL | Age: 44
Discharge: HOME | End: 2019-07-24
Attending: INTERNAL MEDICINE
Payer: MEDICAID

## 2019-07-24 VITALS
TEMPERATURE: 98 F | RESPIRATION RATE: 18 BRPM | SYSTOLIC BLOOD PRESSURE: 141 MMHG | HEART RATE: 70 BPM | DIASTOLIC BLOOD PRESSURE: 75 MMHG

## 2019-07-24 VITALS — BODY MASS INDEX: 40.43 KG/M2 | HEIGHT: 74 IN | WEIGHT: 315 LBS

## 2019-07-24 VITALS
RESPIRATION RATE: 18 BRPM | WEIGHT: 315 LBS | OXYGEN SATURATION: 96 % | BODY MASS INDEX: 40.43 KG/M2 | HEART RATE: 78 BPM | DIASTOLIC BLOOD PRESSURE: 78 MMHG | HEIGHT: 74 IN | SYSTOLIC BLOOD PRESSURE: 141 MMHG | TEMPERATURE: 98 F

## 2019-07-24 DIAGNOSIS — Z17.1 MALIGNANT NEOPLASM INVOLVING BOTH NIPPLE AND AREOLA OF RIGHT BREAST IN MALE, ESTROGEN RECEPTOR NEGATIVE: Primary | ICD-10-CM

## 2019-07-24 DIAGNOSIS — Z17.1 MALIGNANT NEOPLASM INVOLVING BOTH NIPPLE AND AREOLA OF RIGHT BREAST IN MALE, ESTROGEN RECEPTOR NEGATIVE: ICD-10-CM

## 2019-07-24 DIAGNOSIS — C50.021 MALIGNANT NEOPLASM INVOLVING BOTH NIPPLE AND AREOLA OF RIGHT BREAST IN MALE, ESTROGEN RECEPTOR NEGATIVE: Primary | ICD-10-CM

## 2019-07-24 DIAGNOSIS — C50.021 MALIGNANT NEOPLASM INVOLVING BOTH NIPPLE AND AREOLA OF RIGHT BREAST IN MALE, ESTROGEN RECEPTOR NEGATIVE: ICD-10-CM

## 2019-07-24 DIAGNOSIS — I10 ESSENTIAL HYPERTENSION: ICD-10-CM

## 2019-07-24 DIAGNOSIS — Z51.81 ENCOUNTER FOR THERAPEUTIC DRUG MONITORING: ICD-10-CM

## 2019-07-24 DIAGNOSIS — F51.04 PSYCHOPHYSIOLOGICAL INSOMNIA: ICD-10-CM

## 2019-07-24 LAB
ALBUMIN SERPL BCP-MCNC: 3.7 G/DL (ref 3.5–5.2)
ALP SERPL-CCNC: 79 U/L (ref 55–135)
ALT SERPL W/O P-5'-P-CCNC: 35 U/L (ref 10–44)
ANION GAP SERPL CALC-SCNC: 7 MMOL/L (ref 8–16)
AST SERPL-CCNC: 20 U/L (ref 10–40)
BILIRUB SERPL-MCNC: 0.6 MG/DL (ref 0.1–1)
BUN SERPL-MCNC: 10 MG/DL (ref 6–20)
CALCIUM SERPL-MCNC: 9.5 MG/DL (ref 8.7–10.5)
CHLORIDE SERPL-SCNC: 103 MMOL/L (ref 95–110)
CO2 SERPL-SCNC: 27 MMOL/L (ref 23–29)
CREAT SERPL-MCNC: 1.2 MG/DL (ref 0.5–1.4)
ERYTHROCYTE [DISTWIDTH] IN BLOOD BY AUTOMATED COUNT: 17.9 % (ref 11.5–14.5)
EST. GFR  (AFRICAN AMERICAN): >60 ML/MIN/1.73 M^2
EST. GFR  (NON AFRICAN AMERICAN): >60 ML/MIN/1.73 M^2
GLUCOSE SERPL-MCNC: 134 MG/DL (ref 70–110)
HCT VFR BLD AUTO: 31.7 % (ref 40–54)
HGB BLD-MCNC: 9.9 G/DL (ref 14–18)
IMM GRANULOCYTES # BLD AUTO: 0.18 K/UL (ref 0–0.04)
MCH RBC QN AUTO: 25.8 PG (ref 27–31)
MCHC RBC AUTO-ENTMCNC: 31.2 G/DL (ref 32–36)
MCV RBC AUTO: 83 FL (ref 82–98)
NEUTROPHILS # BLD AUTO: 3.6 K/UL (ref 1.8–7.7)
PLATELET # BLD AUTO: 189 K/UL (ref 150–350)
PMV BLD AUTO: 10.8 FL (ref 9.2–12.9)
POTASSIUM SERPL-SCNC: 3.9 MMOL/L (ref 3.5–5.1)
PROT SERPL-MCNC: 6.9 G/DL (ref 6–8.4)
RBC # BLD AUTO: 3.84 M/UL (ref 4.6–6.2)
SODIUM SERPL-SCNC: 137 MMOL/L (ref 136–145)
WBC # BLD AUTO: 5.27 K/UL (ref 3.9–12.7)

## 2019-07-24 PROCEDURE — 96367 TX/PROPH/DG ADDL SEQ IV INF: CPT

## 2019-07-24 PROCEDURE — A4216 STERILE WATER/SALINE, 10 ML: HCPCS | Performed by: INTERNAL MEDICINE

## 2019-07-24 PROCEDURE — 85027 COMPLETE CBC AUTOMATED: CPT

## 2019-07-24 PROCEDURE — 99215 PR OFFICE/OUTPT VISIT, EST, LEVL V, 40-54 MIN: ICD-10-PCS | Mod: S$PBB,,, | Performed by: INTERNAL MEDICINE

## 2019-07-24 PROCEDURE — 63600175 PHARM REV CODE 636 W HCPCS: Performed by: INTERNAL MEDICINE

## 2019-07-24 PROCEDURE — 25000003 PHARM REV CODE 250: Performed by: INTERNAL MEDICINE

## 2019-07-24 PROCEDURE — 76642 ULTRASOUND BREAST LIMITED: CPT | Mod: 26,RT,, | Performed by: RADIOLOGY

## 2019-07-24 PROCEDURE — 99213 OFFICE O/P EST LOW 20 MIN: CPT | Mod: PBBFAC,25 | Performed by: INTERNAL MEDICINE

## 2019-07-24 PROCEDURE — 76642 ULTRASOUND BREAST LIMITED: CPT | Mod: TC,PO,RT

## 2019-07-24 PROCEDURE — 96375 TX/PRO/DX INJ NEW DRUG ADDON: CPT

## 2019-07-24 PROCEDURE — 99999 PR PBB SHADOW E&M-EST. PATIENT-LVL III: ICD-10-PCS | Mod: PBBFAC,,, | Performed by: INTERNAL MEDICINE

## 2019-07-24 PROCEDURE — 99999 PR PBB SHADOW E&M-EST. PATIENT-LVL III: CPT | Mod: PBBFAC,,, | Performed by: INTERNAL MEDICINE

## 2019-07-24 PROCEDURE — 96413 CHEMO IV INFUSION 1 HR: CPT

## 2019-07-24 PROCEDURE — 99215 OFFICE O/P EST HI 40 MIN: CPT | Mod: S$PBB,,, | Performed by: INTERNAL MEDICINE

## 2019-07-24 PROCEDURE — S0028 INJECTION, FAMOTIDINE, 20 MG: HCPCS | Performed by: INTERNAL MEDICINE

## 2019-07-24 PROCEDURE — 80053 COMPREHEN METABOLIC PANEL: CPT

## 2019-07-24 PROCEDURE — 76642 US BREAST RIGHT LIMITED: ICD-10-PCS | Mod: 26,RT,, | Performed by: RADIOLOGY

## 2019-07-24 RX ORDER — DIPHENHYDRAMINE HYDROCHLORIDE 50 MG/ML
50 INJECTION INTRAMUSCULAR; INTRAVENOUS ONCE AS NEEDED
Status: CANCELLED | OUTPATIENT
Start: 2019-07-24

## 2019-07-24 RX ORDER — SODIUM CHLORIDE 0.9 % (FLUSH) 0.9 %
10 SYRINGE (ML) INJECTION
Status: COMPLETED | OUTPATIENT
Start: 2019-07-24 | End: 2019-07-24

## 2019-07-24 RX ORDER — EPINEPHRINE 0.3 MG/.3ML
0.3 INJECTION SUBCUTANEOUS ONCE AS NEEDED
Status: DISCONTINUED | OUTPATIENT
Start: 2019-07-24 | End: 2019-07-24 | Stop reason: HOSPADM

## 2019-07-24 RX ORDER — DIPHENHYDRAMINE HYDROCHLORIDE 50 MG/ML
50 INJECTION INTRAMUSCULAR; INTRAVENOUS ONCE AS NEEDED
Status: DISCONTINUED | OUTPATIENT
Start: 2019-07-24 | End: 2019-07-24 | Stop reason: HOSPADM

## 2019-07-24 RX ORDER — TEMAZEPAM 15 MG/1
15 CAPSULE ORAL NIGHTLY PRN
Qty: 30 CAPSULE | Refills: 1 | Status: SHIPPED | OUTPATIENT
Start: 2019-07-24 | End: 2019-10-18

## 2019-07-24 RX ORDER — HEPARIN 100 UNIT/ML
500 SYRINGE INTRAVENOUS
Status: COMPLETED | OUTPATIENT
Start: 2019-07-24 | End: 2019-07-24

## 2019-07-24 RX ORDER — FAMOTIDINE 10 MG/ML
20 INJECTION INTRAVENOUS
Status: CANCELLED | OUTPATIENT
Start: 2019-07-24

## 2019-07-24 RX ORDER — FAMOTIDINE 10 MG/ML
20 INJECTION INTRAVENOUS
Status: COMPLETED | OUTPATIENT
Start: 2019-07-24 | End: 2019-07-24

## 2019-07-24 RX ORDER — HEPARIN 100 UNIT/ML
500 SYRINGE INTRAVENOUS
Status: DISCONTINUED | OUTPATIENT
Start: 2019-07-24 | End: 2019-07-24 | Stop reason: HOSPADM

## 2019-07-24 RX ORDER — SODIUM CHLORIDE 0.9 % (FLUSH) 0.9 %
10 SYRINGE (ML) INJECTION
Status: CANCELLED | OUTPATIENT
Start: 2019-07-24

## 2019-07-24 RX ORDER — HEPARIN 100 UNIT/ML
500 SYRINGE INTRAVENOUS
Status: CANCELLED | OUTPATIENT
Start: 2019-07-24

## 2019-07-24 RX ORDER — SODIUM CHLORIDE 0.9 % (FLUSH) 0.9 %
10 SYRINGE (ML) INJECTION
Status: DISCONTINUED | OUTPATIENT
Start: 2019-07-24 | End: 2019-07-24 | Stop reason: HOSPADM

## 2019-07-24 RX ORDER — EPINEPHRINE 0.3 MG/.3ML
0.3 INJECTION SUBCUTANEOUS ONCE AS NEEDED
Status: CANCELLED | OUTPATIENT
Start: 2019-07-24

## 2019-07-24 RX ADMIN — DEXAMETHASONE SODIUM PHOSPHATE 10 MG: 4 INJECTION, SOLUTION INTRAMUSCULAR; INTRAVENOUS at 02:07

## 2019-07-24 RX ADMIN — SODIUM CHLORIDE: 0.9 INJECTION, SOLUTION INTRAVENOUS at 02:07

## 2019-07-24 RX ADMIN — Medication 10 ML: at 01:07

## 2019-07-24 RX ADMIN — HEPARIN 500 UNITS: 100 SYRINGE at 04:07

## 2019-07-24 RX ADMIN — DIPHENHYDRAMINE HYDROCHLORIDE 25 MG: 50 INJECTION, SOLUTION INTRAMUSCULAR; INTRAVENOUS at 02:07

## 2019-07-24 RX ADMIN — FAMOTIDINE 20 MG: 10 INJECTION INTRAVENOUS at 02:07

## 2019-07-24 RX ADMIN — PACLITAXEL 246 MG: 6 INJECTION, SOLUTION INTRAVENOUS at 03:07

## 2019-07-24 RX ADMIN — HEPARIN 500 UNITS: 100 SYRINGE at 01:07

## 2019-07-24 NOTE — NURSING
1310 pt here for port access and lab draw, port cleaned  per policy and accessed without issue, specimen sent to lab, port remains accessed for possible chemo, no distress noted upon d/c to drs appt

## 2019-07-24 NOTE — PLAN OF CARE
"Problem: Nausea and Vomiting (Chemotherapy Effects)  Goal: Fluid and Electrolyte Balance    Intervention: Prevent and Manage Nausea and Vomiting  Education given on administered medication as well as handout from [facility]. Tolerated taxol infusion without adverse events noted during infusion. Pt reported "leg restlessness" during benadryl infusion told that this can be controled by running the infusion at a slower rate next treatment. Pt ok with this plan. PAC flushed hep locked de accessed site covered. avs printed and reviewed instructed to contact MD office with questions or concerns. Educated on post infusion side effects verbalized understanding. Leaves clinic ambulatory vitals stable accompanied by family no distress.         "

## 2019-07-29 NOTE — PROGRESS NOTES
History:     Reason For Consultation:   1. Stage IB ([id number]) triple negative invasive ductal carcinoma with lobular features of right breast, retroareolar, Grade 2, Ki67 80%, diagnosed 5/2019    Records Obtained: Records of the patients history including those obtained from the referring provider were reviewed and summarized in detail.    HPI:   Paul Li Jr. presents for follow up of breast cancer and cycle 2 weekly Taxol.   Insomnia is improved with Restoril. Anxiety is improved. He's doing really well. + fatigue.     History: I reviewed, confirmed and updated history (past medical, social, family) as necessary.    Medications    Current Outpatient Medications:     amLODIPine (NORVASC) 10 MG tablet, TAKE 1 TABLET(10 MG) BY MOUTH EVERY DAY, Disp: 30 tablet, Rfl: 0    HYDROcodone-acetaminophen (NORCO) 5-325 mg per tablet, Take 1 tablet by mouth every 6 (six) hours as needed for Pain., Disp: 8 tablet, Rfl: 0    ibuprofen (ADVIL,MOTRIN) 800 MG tablet, Take 800 mg by mouth 3 (three) times daily., Disp: , Rfl:     lidocaine-prilocaine (EMLA) cream, Apply to affected area 45 minutes before port access, Disp: 30 g, Rfl: 1    losartan-hydrochlorothiazide 100-25 mg (HYZAAR) 100-25 mg per tablet, TAKE 1 TABLET BY MOUTH EVERY DAY, Disp: 90 tablet, Rfl: 0    ondansetron (ZOFRAN-ODT) 8 MG TbDL, Take 1 tablet (8 mg total) by mouth every 12 (twelve) hours as needed., Disp: 20 tablet, Rfl: 1    temazepam (RESTORIL) 15 mg Cap, Take 1 capsule (15 mg total) by mouth nightly as needed., Disp: 30 capsule, Rfl: 1    colchicine 0.6 mg tablet, Take 1 tablet (0.6 mg total) by mouth 2 (two) times daily as needed., Disp: 9 tablet, Rfl: 11  No current facility-administered medications for this visit.   Allergies  Review of patient's allergies indicates:  No Known Allergies  Review of Systems  Review of Systems   Constitutional: Positive for fatigue. Negative for activity change, appetite change, chills, fever and unexpected  "weight change.   HENT: Negative for congestion, hearing loss, nosebleeds, sinus pressure and trouble swallowing.    Eyes: Negative for pain, discharge, itching and visual disturbance.   Respiratory: Negative for cough, chest tightness and shortness of breath.    Cardiovascular: Negative for chest pain, palpitations and leg swelling.   Gastrointestinal: Negative for abdominal distention, abdominal pain, blood in stool, constipation, diarrhea, nausea, rectal pain and vomiting.   Endocrine: Negative for heat intolerance and polydipsia.   Genitourinary: Negative for difficulty urinating, dysuria, flank pain, frequency, hematuria and urgency.   Musculoskeletal: Negative for arthralgias, back pain and neck pain.   Skin: Negative for color change, pallor and rash.   Neurological: Negative for dizziness, numbness and headaches.   Hematological: Negative for adenopathy. Does not bruise/bleed easily.   Psychiatric/Behavioral: Negative for confusion, decreased concentration and sleep disturbance. The patient is not nervous/anxious.         Objective     Objective:     Vitals:    07/31/19 1101   BP: (!) 141/75   BP Location: Left arm   Patient Position: Sitting   BP Method: Large (Automatic)   Pulse: 79   Resp: 18   Temp: 98.3 °F (36.8 °C)   TempSrc: Oral   SpO2: 97%   Weight: (!) 183.8 kg (405 lb 3.3 oz)   Height: (P) 6' 1.5" (1.867 m)   Body mass index is 52.74 kg/m² (pended).    Body surface area is 3.09 meters squared (pended).  Physical Exam   Constitutional: He is oriented to person, place, and time. He appears well-developed and well-nourished. No distress.   HENT:   Head: Normocephalic and atraumatic.   Mouth/Throat: Oropharynx is clear and moist and mucous membranes are normal. No oral lesions.   Eyes: Conjunctivae and EOM are normal. Right eye exhibits no discharge. Left eye exhibits no discharge. No scleral icterus.   Cardiovascular: Normal rate, regular rhythm, S1 normal, S2 normal and normal heart sounds. "   Pulmonary/Chest: Effort normal and breath sounds normal. No respiratory distress. He has no wheezes. He has no rhonchi. He has no rales.   Right breast with nipple retraction and skin thickening decreasing; no palpable mass  Left mediport accessed.   Abdominal: Soft. Normal appearance and bowel sounds are normal. There is no tenderness.   Musculoskeletal: Normal range of motion. He exhibits edema. He exhibits no deformity.   Lymphadenopathy:     He has no cervical adenopathy.     He has no axillary adenopathy.        Right: No supraclavicular adenopathy present.        Left: No supraclavicular adenopathy present.   Neurological: He is alert and oriented to person, place, and time. He has normal strength.   Skin: Skin is warm, dry and intact. No pallor.   Darkening on palms   Psychiatric: His behavior is normal. Thought content normal.     Initial Breast exam (pre-chemo): Right breast with nipple retraction and skin thickening around nipple without well defined mass behind the skin thickening.     Labs and Imaging  Results for orders placed or performed in visit on 07/31/19   CBC Oncology   Result Value Ref Range    WBC 4.42 3.90 - 12.70 K/uL    RBC 3.84 (L) 4.60 - 6.20 M/uL    Hemoglobin 9.9 (L) 14.0 - 18.0 g/dL    Hematocrit 31.9 (L) 40.0 - 54.0 %    Mean Corpuscular Volume 83 82 - 98 fL    Mean Corpuscular Hemoglobin 25.8 (L) 27.0 - 31.0 pg    Mean Corpuscular Hemoglobin Conc 31.0 (L) 32.0 - 36.0 g/dL    RDW 18.4 (H) 11.5 - 14.5 %    Platelets 283 150 - 350 K/uL    MPV 10.9 9.2 - 12.9 fL    Gran # (ANC) 2.6 1.8 - 7.7 K/uL    Immature Grans (Abs) 0.05 (H) 0.00 - 0.04 K/uL       Assessment     Assessment:   1. Stage IB ([id number]) invasive ductal carcinoma with lobular features of right breast, retroareolar, ER neg, RI neg, Her2 neg, Grade 2, Ki67 80%, diagnosed 5/2019   * Genetics negative   * 5/27/19 initiated neoadjuvant ddAC followed by weekly Taxol.    * US after ddAC showing improving disease; post  anthracycline echo ordered   * Cycle 2 neoadjuvant weekly Taxol. Tumor shrinking. Tolerance is great.     2. Microcytic anemia   * Has anemia as baseline but worsened with chemotherapy   * Monitoring. Progressive.     3. Adjustment disorder/depression.    * Dr Nunez; improving.     4. Drug induced constipation   * Continue senna S.     5. HTN per PCP    6. Insomnia   * Tried melatonin without success   * Restoril 15 mg nightly for insomnia. Improving.       Plan:   Cycle 2 neoadjuvant Taxol.  Continue Restoril for insomnia  MD in 1, 3 weeks and 5 weeks (patient desires frequent follow ups); continue weekly Taxol.     In addition to chemo monitoring, I eval'd tumor response to chemotherapy, reviewed US and ordered echo.     Future Appointments   Date Time Provider Department Center   7/31/2019  1:00 PM [facility] [place]   8/7/2019  7:20 AM LAB, HEMON CANCER BLDG [facility] [place] [facility]   8/7/2019  8:00 AM Richa Bahena MD [facility] HEM ONC [place]   8/7/2019  9:30 AM Matthew Sandoval, PhD [facility] CAN PSY [place]   8/7/2019  1:00 PM [facility] [place]   8/14/2019 10:30 AM INJECTION, [facility] [place]   8/14/2019 11:30 AM [facility] [place]   8/21/2019  1:45 PM INJECTION, [facility] [place]   8/21/2019  3:00 PM Richa Bahena MD [facility] HEM ONC [place]   8/21/2019  4:00 PM [facility] [place]   9/20/2019  7:40 AM [facility]

## 2019-07-31 ENCOUNTER — OFFICE VISIT (OUTPATIENT)
Dept: HEMATOLOGY/ONCOLOGY | Facility: CLINIC | Age: 44
End: 2019-07-31
Payer: MEDICAID

## 2019-07-31 ENCOUNTER — INFUSION (OUTPATIENT)
Dept: INFUSION THERAPY | Facility: HOSPITAL | Age: 44
End: 2019-07-31
Attending: INTERNAL MEDICINE
Payer: MEDICAID

## 2019-07-31 VITALS
DIASTOLIC BLOOD PRESSURE: 75 MMHG | SYSTOLIC BLOOD PRESSURE: 141 MMHG | OXYGEN SATURATION: 97 % | RESPIRATION RATE: 18 BRPM | WEIGHT: 315 LBS | TEMPERATURE: 98 F | HEART RATE: 79 BPM | BODY MASS INDEX: 52.74 KG/M2

## 2019-07-31 VITALS
HEART RATE: 88 BPM | DIASTOLIC BLOOD PRESSURE: 71 MMHG | BODY MASS INDEX: 40.43 KG/M2 | HEIGHT: 74 IN | TEMPERATURE: 98 F | RESPIRATION RATE: 18 BRPM | WEIGHT: 315 LBS | SYSTOLIC BLOOD PRESSURE: 138 MMHG

## 2019-07-31 DIAGNOSIS — Z17.1 MALIGNANT NEOPLASM INVOLVING BOTH NIPPLE AND AREOLA OF RIGHT BREAST IN MALE, ESTROGEN RECEPTOR NEGATIVE: Primary | ICD-10-CM

## 2019-07-31 DIAGNOSIS — C50.021 MALIGNANT NEOPLASM INVOLVING BOTH NIPPLE AND AREOLA OF RIGHT BREAST IN MALE, ESTROGEN RECEPTOR NEGATIVE: Primary | ICD-10-CM

## 2019-07-31 DIAGNOSIS — D50.9 MICROCYTIC ANEMIA: ICD-10-CM

## 2019-07-31 DIAGNOSIS — F43.23 ADJUSTMENT DISORDER WITH MIXED ANXIETY AND DEPRESSED MOOD: ICD-10-CM

## 2019-07-31 DIAGNOSIS — I10 ESSENTIAL HYPERTENSION: ICD-10-CM

## 2019-07-31 LAB
ALBUMIN SERPL BCP-MCNC: 4 G/DL (ref 3.5–5.2)
ALP SERPL-CCNC: 69 U/L (ref 55–135)
ALT SERPL W/O P-5'-P-CCNC: 49 U/L (ref 10–44)
ANION GAP SERPL CALC-SCNC: 10 MMOL/L (ref 8–16)
AST SERPL-CCNC: 19 U/L (ref 10–40)
BILIRUB SERPL-MCNC: 0.9 MG/DL (ref 0.1–1)
BUN SERPL-MCNC: 13 MG/DL (ref 6–20)
CALCIUM SERPL-MCNC: 9.7 MG/DL (ref 8.7–10.5)
CHLORIDE SERPL-SCNC: 101 MMOL/L (ref 95–110)
CO2 SERPL-SCNC: 26 MMOL/L (ref 23–29)
CREAT SERPL-MCNC: 1.2 MG/DL (ref 0.5–1.4)
ERYTHROCYTE [DISTWIDTH] IN BLOOD BY AUTOMATED COUNT: 18.4 % (ref 11.5–14.5)
EST. GFR  (AFRICAN AMERICAN): >60 ML/MIN/1.73 M^2
EST. GFR  (NON AFRICAN AMERICAN): >60 ML/MIN/1.73 M^2
GLUCOSE SERPL-MCNC: 142 MG/DL (ref 70–110)
HCT VFR BLD AUTO: 31.9 % (ref 40–54)
HGB BLD-MCNC: 9.9 G/DL (ref 14–18)
IMM GRANULOCYTES # BLD AUTO: 0.05 K/UL (ref 0–0.04)
MCH RBC QN AUTO: 25.8 PG (ref 27–31)
MCHC RBC AUTO-ENTMCNC: 31 G/DL (ref 32–36)
MCV RBC AUTO: 83 FL (ref 82–98)
NEUTROPHILS # BLD AUTO: 2.6 K/UL (ref 1.8–7.7)
PLATELET # BLD AUTO: 283 K/UL (ref 150–350)
PMV BLD AUTO: 10.9 FL (ref 9.2–12.9)
POTASSIUM SERPL-SCNC: 3.9 MMOL/L (ref 3.5–5.1)
PROT SERPL-MCNC: 7.6 G/DL (ref 6–8.4)
RBC # BLD AUTO: 3.84 M/UL (ref 4.6–6.2)
SODIUM SERPL-SCNC: 137 MMOL/L (ref 136–145)
WBC # BLD AUTO: 4.42 K/UL (ref 3.9–12.7)

## 2019-07-31 PROCEDURE — 99999 PR PBB SHADOW E&M-EST. PATIENT-LVL III: CPT | Mod: PBBFAC,,, | Performed by: INTERNAL MEDICINE

## 2019-07-31 PROCEDURE — 96367 TX/PROPH/DG ADDL SEQ IV INF: CPT

## 2019-07-31 PROCEDURE — 99213 OFFICE O/P EST LOW 20 MIN: CPT | Mod: PBBFAC,25 | Performed by: INTERNAL MEDICINE

## 2019-07-31 PROCEDURE — 96413 CHEMO IV INFUSION 1 HR: CPT

## 2019-07-31 PROCEDURE — 63600175 PHARM REV CODE 636 W HCPCS: Performed by: INTERNAL MEDICINE

## 2019-07-31 PROCEDURE — A4216 STERILE WATER/SALINE, 10 ML: HCPCS | Performed by: INTERNAL MEDICINE

## 2019-07-31 PROCEDURE — 99999 PR PBB SHADOW E&M-EST. PATIENT-LVL III: ICD-10-PCS | Mod: PBBFAC,,, | Performed by: INTERNAL MEDICINE

## 2019-07-31 PROCEDURE — S0028 INJECTION, FAMOTIDINE, 20 MG: HCPCS | Performed by: INTERNAL MEDICINE

## 2019-07-31 PROCEDURE — 25000003 PHARM REV CODE 250: Performed by: INTERNAL MEDICINE

## 2019-07-31 PROCEDURE — 99214 OFFICE O/P EST MOD 30 MIN: CPT | Mod: S$PBB,,, | Performed by: INTERNAL MEDICINE

## 2019-07-31 PROCEDURE — 85027 COMPLETE CBC AUTOMATED: CPT

## 2019-07-31 PROCEDURE — 80053 COMPREHEN METABOLIC PANEL: CPT

## 2019-07-31 PROCEDURE — 96375 TX/PRO/DX INJ NEW DRUG ADDON: CPT

## 2019-07-31 PROCEDURE — 99214 PR OFFICE/OUTPT VISIT, EST, LEVL IV, 30-39 MIN: ICD-10-PCS | Mod: S$PBB,,, | Performed by: INTERNAL MEDICINE

## 2019-07-31 RX ORDER — HEPARIN 100 UNIT/ML
500 SYRINGE INTRAVENOUS
Status: CANCELLED | OUTPATIENT
Start: 2019-07-31

## 2019-07-31 RX ORDER — FAMOTIDINE 10 MG/ML
20 INJECTION INTRAVENOUS
Status: CANCELLED | OUTPATIENT
Start: 2019-07-31

## 2019-07-31 RX ORDER — DIPHENHYDRAMINE HYDROCHLORIDE 50 MG/ML
50 INJECTION INTRAMUSCULAR; INTRAVENOUS ONCE AS NEEDED
Status: DISCONTINUED | OUTPATIENT
Start: 2019-07-31 | End: 2019-07-31 | Stop reason: HOSPADM

## 2019-07-31 RX ORDER — SODIUM CHLORIDE 0.9 % (FLUSH) 0.9 %
10 SYRINGE (ML) INJECTION
Status: CANCELLED | OUTPATIENT
Start: 2019-07-31

## 2019-07-31 RX ORDER — HEPARIN 100 UNIT/ML
500 SYRINGE INTRAVENOUS
Status: DISCONTINUED | OUTPATIENT
Start: 2019-07-31 | End: 2019-07-31 | Stop reason: HOSPADM

## 2019-07-31 RX ORDER — SODIUM CHLORIDE 0.9 % (FLUSH) 0.9 %
10 SYRINGE (ML) INJECTION
Status: CANCELLED | OUTPATIENT
Start: 2019-08-05

## 2019-07-31 RX ORDER — EPINEPHRINE 0.3 MG/.3ML
0.3 INJECTION SUBCUTANEOUS ONCE AS NEEDED
Status: CANCELLED | OUTPATIENT
Start: 2019-07-31

## 2019-07-31 RX ORDER — FAMOTIDINE 10 MG/ML
20 INJECTION INTRAVENOUS
Status: COMPLETED | OUTPATIENT
Start: 2019-07-31 | End: 2019-07-31

## 2019-07-31 RX ORDER — EPINEPHRINE 0.3 MG/.3ML
0.3 INJECTION SUBCUTANEOUS ONCE AS NEEDED
Status: DISCONTINUED | OUTPATIENT
Start: 2019-07-31 | End: 2019-07-31 | Stop reason: HOSPADM

## 2019-07-31 RX ORDER — FAMOTIDINE 10 MG/ML
20 INJECTION INTRAVENOUS
Status: CANCELLED | OUTPATIENT
Start: 2019-08-05

## 2019-07-31 RX ORDER — EPINEPHRINE 0.3 MG/.3ML
0.3 INJECTION SUBCUTANEOUS ONCE AS NEEDED
Status: CANCELLED | OUTPATIENT
Start: 2019-08-05

## 2019-07-31 RX ORDER — HEPARIN 100 UNIT/ML
500 SYRINGE INTRAVENOUS
Status: CANCELLED | OUTPATIENT
Start: 2019-08-05

## 2019-07-31 RX ORDER — DIPHENHYDRAMINE HYDROCHLORIDE 50 MG/ML
50 INJECTION INTRAMUSCULAR; INTRAVENOUS ONCE AS NEEDED
Status: CANCELLED | OUTPATIENT
Start: 2019-07-31

## 2019-07-31 RX ORDER — SODIUM CHLORIDE 0.9 % (FLUSH) 0.9 %
10 SYRINGE (ML) INJECTION
Status: DISCONTINUED | OUTPATIENT
Start: 2019-07-31 | End: 2019-07-31 | Stop reason: HOSPADM

## 2019-07-31 RX ORDER — DIPHENHYDRAMINE HYDROCHLORIDE 50 MG/ML
50 INJECTION INTRAMUSCULAR; INTRAVENOUS ONCE AS NEEDED
Status: CANCELLED | OUTPATIENT
Start: 2019-08-05

## 2019-07-31 RX ADMIN — PACLITAXEL 246 MG: 6 INJECTION, SOLUTION INTRAVENOUS at 02:07

## 2019-07-31 RX ADMIN — HEPARIN 500 UNITS: 100 SYRINGE at 03:07

## 2019-07-31 RX ADMIN — DIPHENHYDRAMINE HYDROCHLORIDE 25 MG: 50 INJECTION INTRAMUSCULAR; INTRAVENOUS at 01:07

## 2019-07-31 RX ADMIN — Medication 10 ML: at 03:07

## 2019-07-31 RX ADMIN — SODIUM CHLORIDE: 9 INJECTION, SOLUTION INTRAVENOUS at 01:07

## 2019-07-31 RX ADMIN — DEXAMETHASONE SODIUM PHOSPHATE 10 MG: 4 INJECTION, SOLUTION INTRAMUSCULAR; INTRAVENOUS at 01:07

## 2019-07-31 RX ADMIN — FAMOTIDINE 20 MG: 10 INJECTION INTRAVENOUS at 02:07

## 2019-07-31 NOTE — PLAN OF CARE
Problem: Nausea and Vomiting (Chemotherapy Effects)  Goal: Fluid and Electrolyte Balance    Intervention: Prevent and Manage Nausea and Vomiting  Pt here today for Taxol cycle 2. Port was accessed for labs, flushes easily, blood return present. No questions at this time. VSS.

## 2019-07-31 NOTE — PLAN OF CARE
Problem: Adult Inpatient Plan of Care  Goal: Plan of Care Review  Outcome: Ongoing (interventions implemented as appropriate)  Pt tolerated infusion. VSS. Port flushed, hep-locked and de-accessed. No questions at this time.

## 2019-08-07 ENCOUNTER — OFFICE VISIT (OUTPATIENT)
Dept: HEMATOLOGY/ONCOLOGY | Facility: CLINIC | Age: 44
End: 2019-08-07
Payer: MEDICAID

## 2019-08-07 ENCOUNTER — DOCUMENTATION ONLY (OUTPATIENT)
Dept: HEMATOLOGY/ONCOLOGY | Facility: CLINIC | Age: 44
End: 2019-08-07

## 2019-08-07 ENCOUNTER — INFUSION (OUTPATIENT)
Dept: INFUSION THERAPY | Facility: HOSPITAL | Age: 44
End: 2019-08-07
Attending: INTERNAL MEDICINE
Payer: MEDICAID

## 2019-08-07 ENCOUNTER — OFFICE VISIT (OUTPATIENT)
Dept: PSYCHIATRY | Facility: CLINIC | Age: 44
End: 2019-08-07
Payer: MEDICAID

## 2019-08-07 VITALS
OXYGEN SATURATION: 97 % | DIASTOLIC BLOOD PRESSURE: 75 MMHG | TEMPERATURE: 99 F | HEART RATE: 80 BPM | HEIGHT: 74 IN | RESPIRATION RATE: 18 BRPM | SYSTOLIC BLOOD PRESSURE: 164 MMHG | BODY MASS INDEX: 52.74 KG/M2

## 2019-08-07 VITALS
BODY MASS INDEX: 40.43 KG/M2 | TEMPERATURE: 99 F | DIASTOLIC BLOOD PRESSURE: 71 MMHG | HEART RATE: 74 BPM | SYSTOLIC BLOOD PRESSURE: 144 MMHG | RESPIRATION RATE: 18 BRPM | HEIGHT: 74 IN | WEIGHT: 315 LBS

## 2019-08-07 DIAGNOSIS — Z17.1 MALIGNANT NEOPLASM INVOLVING BOTH NIPPLE AND AREOLA OF RIGHT BREAST IN MALE, ESTROGEN RECEPTOR NEGATIVE: Primary | ICD-10-CM

## 2019-08-07 DIAGNOSIS — I10 ESSENTIAL HYPERTENSION: ICD-10-CM

## 2019-08-07 DIAGNOSIS — F51.04 PSYCHOPHYSIOLOGICAL INSOMNIA: ICD-10-CM

## 2019-08-07 DIAGNOSIS — F51.04 PSYCHOPHYSIOLOGICAL INSOMNIA: Primary | ICD-10-CM

## 2019-08-07 DIAGNOSIS — C50.021 MALIGNANT NEOPLASM INVOLVING BOTH NIPPLE AND AREOLA OF RIGHT BREAST IN MALE, ESTROGEN RECEPTOR NEGATIVE: Primary | ICD-10-CM

## 2019-08-07 DIAGNOSIS — D50.9 MICROCYTIC ANEMIA: ICD-10-CM

## 2019-08-07 DIAGNOSIS — F43.23 ADJUSTMENT DISORDER WITH MIXED ANXIETY AND DEPRESSED MOOD: ICD-10-CM

## 2019-08-07 DIAGNOSIS — T45.1X5A CHEMOTHERAPY-INDUCED NEUROPATHY: ICD-10-CM

## 2019-08-07 DIAGNOSIS — G62.0 CHEMOTHERAPY-INDUCED NEUROPATHY: ICD-10-CM

## 2019-08-07 PROCEDURE — 99212 OFFICE O/P EST SF 10 MIN: CPT | Mod: PBBFAC,25 | Performed by: PSYCHOLOGIST

## 2019-08-07 PROCEDURE — 99999 PR PBB SHADOW E&M-EST. PATIENT-LVL II: ICD-10-PCS | Mod: PBBFAC,,, | Performed by: PSYCHOLOGIST

## 2019-08-07 PROCEDURE — 99213 OFFICE O/P EST LOW 20 MIN: CPT | Mod: PBBFAC,27,25 | Performed by: INTERNAL MEDICINE

## 2019-08-07 PROCEDURE — 99215 OFFICE O/P EST HI 40 MIN: CPT | Mod: S$PBB,,, | Performed by: INTERNAL MEDICINE

## 2019-08-07 PROCEDURE — 96367 TX/PROPH/DG ADDL SEQ IV INF: CPT

## 2019-08-07 PROCEDURE — S0028 INJECTION, FAMOTIDINE, 20 MG: HCPCS | Performed by: INTERNAL MEDICINE

## 2019-08-07 PROCEDURE — 96376 TX/PRO/DX INJ SAME DRUG ADON: CPT

## 2019-08-07 PROCEDURE — A4216 STERILE WATER/SALINE, 10 ML: HCPCS | Performed by: INTERNAL MEDICINE

## 2019-08-07 PROCEDURE — 96413 CHEMO IV INFUSION 1 HR: CPT

## 2019-08-07 PROCEDURE — 90834 PR PSYCHOTHERAPY W/PATIENT, 45 MIN: ICD-10-PCS | Mod: HP,HB,, | Performed by: PSYCHOLOGIST

## 2019-08-07 PROCEDURE — 90834 PSYTX W PT 45 MINUTES: CPT | Mod: HP,HB,, | Performed by: PSYCHOLOGIST

## 2019-08-07 PROCEDURE — 63600175 PHARM REV CODE 636 W HCPCS: Performed by: INTERNAL MEDICINE

## 2019-08-07 PROCEDURE — 99999 PR PBB SHADOW E&M-EST. PATIENT-LVL II: CPT | Mod: PBBFAC,,, | Performed by: PSYCHOLOGIST

## 2019-08-07 PROCEDURE — 25000003 PHARM REV CODE 250: Performed by: INTERNAL MEDICINE

## 2019-08-07 PROCEDURE — 99999 PR PBB SHADOW E&M-EST. PATIENT-LVL III: ICD-10-PCS | Mod: PBBFAC,,, | Performed by: INTERNAL MEDICINE

## 2019-08-07 PROCEDURE — 99215 PR OFFICE/OUTPT VISIT, EST, LEVL V, 40-54 MIN: ICD-10-PCS | Mod: S$PBB,,, | Performed by: INTERNAL MEDICINE

## 2019-08-07 PROCEDURE — 99999 PR PBB SHADOW E&M-EST. PATIENT-LVL III: CPT | Mod: PBBFAC,,, | Performed by: INTERNAL MEDICINE

## 2019-08-07 RX ORDER — DIPHENHYDRAMINE HYDROCHLORIDE 50 MG/ML
50 INJECTION INTRAMUSCULAR; INTRAVENOUS ONCE AS NEEDED
Status: DISCONTINUED | OUTPATIENT
Start: 2019-08-07 | End: 2019-08-07 | Stop reason: HOSPADM

## 2019-08-07 RX ORDER — SODIUM CHLORIDE 0.9 % (FLUSH) 0.9 %
10 SYRINGE (ML) INJECTION
Status: CANCELLED | OUTPATIENT
Start: 2019-08-12

## 2019-08-07 RX ORDER — SODIUM CHLORIDE 0.9 % (FLUSH) 0.9 %
10 SYRINGE (ML) INJECTION
Status: DISCONTINUED | OUTPATIENT
Start: 2019-08-07 | End: 2019-08-07 | Stop reason: HOSPADM

## 2019-08-07 RX ORDER — FAMOTIDINE 10 MG/ML
20 INJECTION INTRAVENOUS
Status: COMPLETED | OUTPATIENT
Start: 2019-08-07 | End: 2019-08-07

## 2019-08-07 RX ORDER — EPINEPHRINE 0.3 MG/.3ML
0.3 INJECTION SUBCUTANEOUS ONCE AS NEEDED
Status: CANCELLED | OUTPATIENT
Start: 2019-08-12

## 2019-08-07 RX ORDER — EPINEPHRINE 0.3 MG/.3ML
0.3 INJECTION SUBCUTANEOUS ONCE AS NEEDED
Status: DISCONTINUED | OUTPATIENT
Start: 2019-08-07 | End: 2019-08-07 | Stop reason: HOSPADM

## 2019-08-07 RX ORDER — GABAPENTIN 300 MG/1
300 CAPSULE ORAL 2 TIMES DAILY
Qty: 60 CAPSULE | Refills: 2 | Status: SHIPPED | OUTPATIENT
Start: 2019-08-07 | End: 2019-09-18 | Stop reason: SDUPTHER

## 2019-08-07 RX ORDER — FAMOTIDINE 10 MG/ML
20 INJECTION INTRAVENOUS
Status: CANCELLED | OUTPATIENT
Start: 2019-08-12

## 2019-08-07 RX ORDER — DIPHENHYDRAMINE HYDROCHLORIDE 50 MG/ML
50 INJECTION INTRAMUSCULAR; INTRAVENOUS ONCE AS NEEDED
Status: CANCELLED | OUTPATIENT
Start: 2019-08-12

## 2019-08-07 RX ORDER — HEPARIN 100 UNIT/ML
500 SYRINGE INTRAVENOUS
Status: CANCELLED | OUTPATIENT
Start: 2019-08-12

## 2019-08-07 RX ORDER — HEPARIN 100 UNIT/ML
500 SYRINGE INTRAVENOUS
Status: DISCONTINUED | OUTPATIENT
Start: 2019-08-07 | End: 2019-08-07 | Stop reason: HOSPADM

## 2019-08-07 RX ADMIN — PACLITAXEL 186 MG: 6 INJECTION, SOLUTION INTRAVENOUS at 02:08

## 2019-08-07 RX ADMIN — DIPHENHYDRAMINE HYDROCHLORIDE 25 MG: 50 INJECTION, SOLUTION INTRAMUSCULAR; INTRAVENOUS at 01:08

## 2019-08-07 RX ADMIN — Medication 10 ML: at 03:08

## 2019-08-07 RX ADMIN — HEPARIN 500 UNITS: 100 SYRINGE at 03:08

## 2019-08-07 RX ADMIN — DEXAMETHASONE SODIUM PHOSPHATE 10 MG: 4 INJECTION, SOLUTION INTRA-ARTICULAR; INTRALESIONAL; INTRAMUSCULAR; INTRAVENOUS; SOFT TISSUE at 01:08

## 2019-08-07 RX ADMIN — FAMOTIDINE 20 MG: 10 INJECTION INTRAVENOUS at 01:08

## 2019-08-07 RX ADMIN — SODIUM CHLORIDE: 9 INJECTION, SOLUTION INTRAVENOUS at 01:08

## 2019-08-07 NOTE — PLAN OF CARE
Problem: Nausea and Vomiting (Chemotherapy Effects)  Goal: Fluid and Electrolyte Balance  Outcome: Ongoing (interventions implemented as appropriate)  Patient here for Taxol. Assessment completed and labs reviewed.  VSS. No needs at this time.  Chair reclined and blanket offered.  Will continue to monitor.

## 2019-08-07 NOTE — PLAN OF CARE
Problem: Adult Inpatient Plan of Care  Goal: Plan of Care Review  Outcome: Ongoing (interventions implemented as appropriate)  Tolerated infusion well.  No reactions noted.  No needs at this time.  Chair reclined and blanket offered.  Will continue to monitor.

## 2019-08-07 NOTE — PROGRESS NOTES
Received call/voice mail message from patient on late Monday and returned call to him yesterday. He is trying to find out an update on the financial assistance application that my coworker Lorelei Coles [facility], helped him with several weeks ago. He also placed calls to Heidi,  at [facility]. He's applying for rent assistance through the [facility]. His landlord is giving him until 8/12 to pay this month's rent or be evicted. He confirmed receiving the check from the [facility] Patient Assistance Fund and used this to pay July's rent. While I was on the phone with him Heidi also returned his calls and afterwards she called me. She is scheduling to meet with patient this week. Informed her of his 8/12 deadline. She reviewed his application information and said that the physician letter is missing. Sent message via Epic to Dr. Bahena and her RN Rosey yesterday afternoon, and the letter was prepared. Dr. Bahena signed the letter this morning and I picked it up from her office. Scanned and emailed the letter to Heidi, and left her a voice mail message at [phone]. Called patient and informed him of this. He is scheduled to meet with Heidi at her office this afternoon.    Provided patient with the phone number for [facility] ([phone]) to apply for a small financial assistance gilbert. As of yesterday afternoon their website listed that funds are available for male patients with breast cancer. The first step is the patient to call this agency and speak with one of their social workers who will complete screening. If patient meets criteria a bar-coded application form will be sent to patient.  Sent Epic message to Yessenia Poe in the [facility] requesting that the [facility] staff check on the status of patient's disability application and call him with an update. He reported that he had provided additional information that was requested but hadn't heard anything else since 2-3  weeks.  Will continue to follow and assist as needed.

## 2019-08-07 NOTE — PROGRESS NOTES
PSYCHO-ONCOLOGY NOTE/ Individual Psychotherapy     Date: 8/7/2019   Site:  [facility]        Therapeutic Intervention: Met with patient.  Outpatient - Behavior modifying psychotherapy  30 min - CPT code 02620      Patient was last seen by me on 7/9/2019    Problem list  Patient Active Problem List   Diagnosis    Essential hypertension    Gout    Malignant neoplasm involving both nipple and areola of right breast in male, estrogen receptor negative    Microcytic anemia    Morbid obesity with BMI of 50.0-59.9, adult    Adjustment disorder with mixed anxiety and depressed mood    Hydrocele    Drug induced constipation    Psychophysiological insomnia    Chemotherapy-induced neuropathy       Chief complaint/reason for encounter: depression and anxiety   Met with patient to evaluate psychosocial adaptation to diagnosis/treatment/survivorship of breast cancer    Current Medications  Current Outpatient Medications   Medication    amLODIPine (NORVASC) 10 MG tablet    colchicine 0.6 mg tablet    gabapentin (NEURONTIN) 300 MG capsule    HYDROcodone-acetaminophen (NORCO) 5-325 mg per tablet    ibuprofen (ADVIL,MOTRIN) 800 MG tablet    lidocaine-prilocaine (EMLA) cream    losartan-hydrochlorothiazide 100-25 mg (HYZAAR) 100-25 mg per tablet    ondansetron (ZOFRAN-ODT) 8 MG TbDL    temazepam (RESTORIL) 15 mg Cap     No current facility-administered medications for this visit.        Objective:  Paul Li Jr. arrived promptly for the session.  Mr. Li was independently ambulatory at the time of session. The patient was fully cooperative throughout the session.  Appearance: age appropriate, casually  dressed, well groomed  Behavior/Cooperation: friendly and cooperative  Speech: normal in rate, volume, and tone and appropriate quality, quantity and organization of sentences  Mood: happy  Affect: mood congruent, full range and appropriate  Thought Process: goal-directed, logical  Thought Content: normal,   No delusions or paranoia; did not appear to be responding to internal stimuli during the session  Orientation: grossly intact  Memory: Grossly intact  Attention Span/Concentration: Attends to session without distraction; reports no difficulty  Fund of Knowledge: average  Estimate of Intelligence: average from verbal skills and history  Cognition: grossly intact  Insight: patient has awareness of illness; good insight into own behavior and behavior of others  Judgment: the patient's behavior is adequate to circumstances    Interval history and content of current session: Patient discussed events and activities since the time of last visit.  Discussed current adaptation to disease and treatment status and family's adaptation to disease and treatment status. Reports to be coping in an adequate manner. He has had several positive health and psychosocial developments in recent weeks. He is feeling positive, optimistic and in control. He got a gilbert from [employer] which has decreased his financial strain.  Evaluated cognitive response, paying particular attention to negative intrusive thoughts of a persistent and detrimental nature. Thoughts of this type are absent. Examined CBT-I strategies to address his insomnia. Discussed role of sleep scheduling and light support of circadian rhythms (with some adaptation for treatment days). Patient is walking more, doing regular meditation.     Risk parameters:   Patient reports no suicidal ideation  Patient reports no homicidal ideation  Patient reports no self-injurious behavior  Patient reports no violent behavior   Safety needs:  None at this time      Verbal deficits: None     Patient's response to intervention:The patient's response to intervention is accepting.     Progress toward goals and other mental status changes:  The patient's progress toward goals is fair .      Progress to date:Progress as Expected      Goals from last visit: Met      Patient  reported outcomes:  Distress Thermometer:   Distress Score    Distress Score: 0        Practical Problems Physical Problems                                                   Family Problems                                         Emotional Problems                                                         Spiritual/Religions Concerns               Other Problems             PHQ-9=3; 8 at intake    BILL-7=1; 8 at intake      Client Strengths: verbal, intelligent, successful, good social support, good insight, commitment to wellness, strong guadalupe, strong cultural traditions     Diagnosis:     ICD-10-CM ICD-9-CM   1. Psychophysiological insomnia F51.04 307.42   2. Adjustment disorder with mixed anxiety and depressed mood F43.23 309.28       Treatment Plan:individual psychotherapy and medication management by physician  · Target symptoms: depression, anxiety , sleep  · Why chosen therapy is appropriate versus another modality: relevant to diagnosis, patient responds to this modality, evidence based practice  · Outcome monitoring methods: self-report, checklist/rating scale  · Therapeutic intervention type: behavior modifying psychotherapy, supportive psychotherapy  · Prognosis: Good      Behavioral goals:    Exercise: brief walking, as tolerated to combat fatigue   Stress management:  Meditation/breathing exercises daily   Social engagement:   Nutrition:   Smoking Cessation:   Therapy:  Out of bed when not sleeping    Standard wake time    No clock, no backlit electronics at night    Return to clinic: as needed     Length of Service (minutes direct face-to-face contact): 30    Matthew Sandoval, PhD  [place] License #[id number]

## 2019-08-07 NOTE — PROGRESS NOTES
History:     Reason For Consultation:   1. Stage IB ([id number]) triple negative invasive ductal carcinoma with lobular features of right breast, retroareolar, Grade 2, Ki67 80%, diagnosed 5/2019    Records Obtained: Records of the patients history including those obtained from the referring provider were reviewed and summarized in detail.    HPI:   Paul Li Jr. presents for follow up of breast cancer and cycle 3 weekly Taxol.   Insomnia is improved with Restoril but still bothersome. Now with complaints of bilateral feet pain, numbness which is keeping him awake some at night. Worse when he has knees bent for long period of time. Worse at end of day. No alleviating factors.     History: I reviewed, confirmed and updated history (past medical, social, family) as necessary.    Medications    Current Outpatient Medications:     amLODIPine (NORVASC) 10 MG tablet, TAKE 1 TABLET(10 MG) BY MOUTH EVERY DAY, Disp: 30 tablet, Rfl: 0    gabapentin (NEURONTIN) 300 MG capsule, Take 1 capsule (300 mg total) by mouth 2 (two) times daily., Disp: 60 capsule, Rfl: 2    HYDROcodone-acetaminophen (NORCO) 5-325 mg per tablet, Take 1 tablet by mouth every 6 (six) hours as needed for Pain., Disp: 8 tablet, Rfl: 0    ibuprofen (ADVIL,MOTRIN) 800 MG tablet, Take 800 mg by mouth 3 (three) times daily., Disp: , Rfl:     lidocaine-prilocaine (EMLA) cream, Apply to affected area 45 minutes before port access, Disp: 30 g, Rfl: 1    losartan-hydrochlorothiazide 100-25 mg (HYZAAR) 100-25 mg per tablet, TAKE 1 TABLET BY MOUTH EVERY DAY, Disp: 90 tablet, Rfl: 0    ondansetron (ZOFRAN-ODT) 8 MG TbDL, Take 1 tablet (8 mg total) by mouth every 12 (twelve) hours as needed., Disp: 20 tablet, Rfl: 1    temazepam (RESTORIL) 15 mg Cap, Take 1 capsule (15 mg total) by mouth nightly as needed., Disp: 30 capsule, Rfl: 1    colchicine 0.6 mg tablet, Take 1 tablet (0.6 mg total) by mouth 2 (two) times daily as needed., Disp: 9 tablet, Rfl:  "11  Allergies  Review of patient's allergies indicates:  No Known Allergies  Review of Systems  Review of Systems   Constitutional: Positive for fatigue. Negative for activity change, appetite change, chills, fever and unexpected weight change.   HENT: Negative for congestion, hearing loss, nosebleeds, sinus pressure and trouble swallowing.    Eyes: Negative for pain, discharge, itching and visual disturbance.   Respiratory: Negative for cough, chest tightness and shortness of breath.    Cardiovascular: Negative for chest pain, palpitations and leg swelling.   Gastrointestinal: Negative for abdominal distention, abdominal pain, blood in stool, constipation, diarrhea, nausea, rectal pain and vomiting.   Endocrine: Negative for heat intolerance and polydipsia.   Genitourinary: Negative for difficulty urinating, dysuria, flank pain, frequency, hematuria and urgency.   Musculoskeletal: Negative for arthralgias, back pain and neck pain.   Skin: Negative for color change, pallor and rash.   Neurological: Positive for numbness. Negative for dizziness and headaches.   Hematological: Negative for adenopathy. Does not bruise/bleed easily.   Psychiatric/Behavioral: Negative for confusion, decreased concentration and sleep disturbance. The patient is not nervous/anxious.         Objective     Objective:     Vitals:    08/07/19 0843   BP: (!) 164/75   BP Location: Right arm   Patient Position: Sitting   BP Method: Large (Automatic)   Pulse: 80   Resp: 18   Temp: 98.8 °F (37.1 °C)   TempSrc: Oral   SpO2: 97%   Height: 6' 1.5" (1.867 m)   Body mass index is 52.74 kg/m².    Body surface area is 3.09 meters squared.  Physical Exam   Constitutional: He is oriented to person, place, and time. He appears well-developed and well-nourished. No distress.   HENT:   Head: Normocephalic and atraumatic.   Mouth/Throat: Oropharynx is clear and moist and mucous membranes are normal. No oral lesions.   Eyes: Conjunctivae and EOM are normal. " Right eye exhibits no discharge. Left eye exhibits no discharge. No scleral icterus.   Cardiovascular: Normal rate, regular rhythm, S1 normal, S2 normal and normal heart sounds.   Pulmonary/Chest: Effort normal and breath sounds normal. No respiratory distress. He has no wheezes. He has no rhonchi. He has no rales.   Right breast with nipple retraction and skin thickening decreasing; no palpable mass - unchanged.   Left mediport   Abdominal: Soft. Normal appearance and bowel sounds are normal. There is no tenderness.   Musculoskeletal: Normal range of motion. He exhibits edema. He exhibits no deformity.   Lymphadenopathy:     He has no cervical adenopathy.     He has no axillary adenopathy.        Right: No supraclavicular adenopathy present.        Left: No supraclavicular adenopathy present.   Neurological: He is alert and oriented to person, place, and time. He has normal strength.   Skin: Skin is warm, dry and intact. No pallor.   Darkening on palms   Psychiatric: His behavior is normal. Thought content normal.     Initial Breast exam (pre-chemo): Right breast with nipple retraction and skin thickening around nipple without well defined mass behind the skin thickening.     Labs and Imaging  Results for orders placed or performed in visit on 08/07/19   CBC Oncology   Result Value Ref Range    WBC 4.54 3.90 - 12.70 K/uL    RBC 3.65 (L) 4.60 - 6.20 M/uL    Hemoglobin 9.6 (L) 14.0 - 18.0 g/dL    Hematocrit 31.3 (L) 40.0 - 54.0 %    Mean Corpuscular Volume 86 82 - 98 fL    Mean Corpuscular Hemoglobin 26.3 (L) 27.0 - 31.0 pg    Mean Corpuscular Hemoglobin Conc 30.7 (L) 32.0 - 36.0 g/dL    RDW 19.2 (H) 11.5 - 14.5 %    Platelets 273 150 - 350 K/uL    MPV 9.8 9.2 - 12.9 fL    Gran # (ANC) 2.4 1.8 - 7.7 K/uL    Immature Grans (Abs) 0.05 (H) 0.00 - 0.04 K/uL   Comprehensive metabolic panel   Result Value Ref Range    Sodium 140 136 - 145 mmol/L    Potassium 4.1 3.5 - 5.1 mmol/L    Chloride 107 95 - 110 mmol/L    CO2 26 23  - 29 mmol/L    Glucose 142 (H) 70 - 110 mg/dL    BUN, Bld 15 6 - 20 mg/dL    Creatinine 1.2 0.5 - 1.4 mg/dL    Calcium 9.3 8.7 - 10.5 mg/dL    Total Protein 7.3 6.0 - 8.4 g/dL    Albumin 3.7 3.5 - 5.2 g/dL    Total Bilirubin 0.6 0.1 - 1.0 mg/dL    Alkaline Phosphatase 72 55 - 135 U/L    AST 15 10 - 40 U/L    ALT 34 10 - 44 U/L    Anion Gap 7 (L) 8 - 16 mmol/L    eGFR if African American >60.0 >60 mL/min/1.73 m^2    eGFR if non African American >60.0 >60 mL/min/1.73 m^2       Assessment     Assessment:   1. Stage IB ([id number]) invasive ductal carcinoma with lobular features of right breast, retroareolar, ER neg, SD neg, Her2 neg, Grade 2, Ki67 80%, diagnosed 5/2019   * Genetics negative   * 5/27/19 initiated neoadjuvant ddAC followed by weekly Taxol.    * US after ddAC showing improving disease; post anthracycline echo ordered   * Cycle 3 neoadjuvant weekly Taxol. Tumor shrinking. Dose reduce Taxol due to neuropathy. Will receive 60 mg/m2.     2. Microcytic anemia   * Has anemia as baseline but worsened with chemotherapy   * Monitoring. Fairly stable.     3. Adjustment disorder/depression.    * Dr Nunez; improving.     4. Drug induced constipation   * Continue senna S.     5. HTN per PCP    6. Insomnia   * Tried melatonin without success   * Restoril 15 mg nightly for insomnia. Gabapentin which he's getting for PN may help too    7. Chemo neuropathy   * Start Gabapentin 300 mg bid. Decrease dose of taxol, cycle 3 forward. Monitor.       Plan:   Cycle 3 neoadjuvant Taxol, continue weekly with dose reduction. MD in 2 weeks.   Continue Restoril for insomnia  Start gabapentin 300 mg bid.     Future Appointments   Date Time Provider Department Center   8/7/2019  1:00 PM [facility]   8/14/2019 10:30 AM [facility]   8/14/2019 11:30 AM [facility] [place] [facility]   8/21/2019  1:45 PM [facility]   8/21/2019  3:00 PM  [facility]   8/21/2019  4:00 PM [facility]   9/20/2019  7:40 AM Kareem Arroyo MD [facility]

## 2019-08-12 ENCOUNTER — DOCUMENTATION ONLY (OUTPATIENT)
Dept: HEMATOLOGY/ONCOLOGY | Facility: CLINIC | Age: 44
End: 2019-08-12

## 2019-08-12 NOTE — PROGRESS NOTES
Met with patient and his wife on 7/9 to complete applications for financial assistance. Completed the [facility] patient assistance for $1,000 to assist with rent. Wife picked up check from accounts payable. Also completed Alessandra application and submitted. It was for gas cards, meal delivery and yoga. Application for CHRISTOPHER completed and faxed for assistance with gas cards and [employer] cards. Lastly, I completed and faxed application in to [employer] for the [employer] I placed several follow up calls and emails to Heidi with [employer]. Provided him with the phone number for Heidi to follow up as well. Last email to Heidi to follow up was placed on 7/30.

## 2019-08-14 ENCOUNTER — INFUSION (OUTPATIENT)
Dept: INFUSION THERAPY | Facility: HOSPITAL | Age: 44
End: 2019-08-14
Attending: INTERNAL MEDICINE
Payer: MEDICAID

## 2019-08-14 VITALS
WEIGHT: 315 LBS | RESPIRATION RATE: 18 BRPM | HEART RATE: 79 BPM | BODY MASS INDEX: 40.43 KG/M2 | SYSTOLIC BLOOD PRESSURE: 136 MMHG | TEMPERATURE: 99 F | DIASTOLIC BLOOD PRESSURE: 87 MMHG | HEIGHT: 74 IN

## 2019-08-14 DIAGNOSIS — C50.021 MALIGNANT NEOPLASM INVOLVING BOTH NIPPLE AND AREOLA OF RIGHT BREAST IN MALE, ESTROGEN RECEPTOR NEGATIVE: Primary | ICD-10-CM

## 2019-08-14 DIAGNOSIS — Z17.1 MALIGNANT NEOPLASM INVOLVING BOTH NIPPLE AND AREOLA OF RIGHT BREAST IN MALE, ESTROGEN RECEPTOR NEGATIVE: Primary | ICD-10-CM

## 2019-08-14 LAB
ALBUMIN SERPL BCP-MCNC: 3.6 G/DL (ref 3.5–5.2)
ALP SERPL-CCNC: 77 U/L (ref 55–135)
ALT SERPL W/O P-5'-P-CCNC: 36 U/L (ref 10–44)
ANION GAP SERPL CALC-SCNC: 8 MMOL/L (ref 8–16)
AST SERPL-CCNC: 19 U/L (ref 10–40)
BILIRUB SERPL-MCNC: 0.6 MG/DL (ref 0.1–1)
BUN SERPL-MCNC: 10 MG/DL (ref 6–20)
CALCIUM SERPL-MCNC: 9.1 MG/DL (ref 8.7–10.5)
CHLORIDE SERPL-SCNC: 108 MMOL/L (ref 95–110)
CO2 SERPL-SCNC: 26 MMOL/L (ref 23–29)
CREAT SERPL-MCNC: 1 MG/DL (ref 0.5–1.4)
ERYTHROCYTE [DISTWIDTH] IN BLOOD BY AUTOMATED COUNT: 18.6 % (ref 11.5–14.5)
EST. GFR  (AFRICAN AMERICAN): >60 ML/MIN/1.73 M^2
EST. GFR  (NON AFRICAN AMERICAN): >60 ML/MIN/1.73 M^2
GLUCOSE SERPL-MCNC: 114 MG/DL (ref 70–110)
HCT VFR BLD AUTO: 30.7 % (ref 40–54)
HGB BLD-MCNC: 9.5 G/DL (ref 14–18)
IMM GRANULOCYTES # BLD AUTO: 0.03 K/UL (ref 0–0.04)
MCH RBC QN AUTO: 26.5 PG (ref 27–31)
MCHC RBC AUTO-ENTMCNC: 30.9 G/DL (ref 32–36)
MCV RBC AUTO: 86 FL (ref 82–98)
NEUTROPHILS # BLD AUTO: 2 K/UL (ref 1.8–7.7)
PLATELET # BLD AUTO: 235 K/UL (ref 150–350)
PMV BLD AUTO: 10.5 FL (ref 9.2–12.9)
POTASSIUM SERPL-SCNC: 4.2 MMOL/L (ref 3.5–5.1)
PROT SERPL-MCNC: 7 G/DL (ref 6–8.4)
RBC # BLD AUTO: 3.59 M/UL (ref 4.6–6.2)
SODIUM SERPL-SCNC: 142 MMOL/L (ref 136–145)
WBC # BLD AUTO: 4.1 K/UL (ref 3.9–12.7)

## 2019-08-14 PROCEDURE — 63600175 PHARM REV CODE 636 W HCPCS: Performed by: INTERNAL MEDICINE

## 2019-08-14 PROCEDURE — A4216 STERILE WATER/SALINE, 10 ML: HCPCS | Performed by: INTERNAL MEDICINE

## 2019-08-14 PROCEDURE — 25000003 PHARM REV CODE 250: Performed by: INTERNAL MEDICINE

## 2019-08-14 PROCEDURE — 96413 CHEMO IV INFUSION 1 HR: CPT

## 2019-08-14 PROCEDURE — 96367 TX/PROPH/DG ADDL SEQ IV INF: CPT

## 2019-08-14 PROCEDURE — 85027 COMPLETE CBC AUTOMATED: CPT

## 2019-08-14 PROCEDURE — S0028 INJECTION, FAMOTIDINE, 20 MG: HCPCS | Performed by: INTERNAL MEDICINE

## 2019-08-14 PROCEDURE — 96375 TX/PRO/DX INJ NEW DRUG ADDON: CPT

## 2019-08-14 PROCEDURE — 80053 COMPREHEN METABOLIC PANEL: CPT

## 2019-08-14 RX ORDER — HEPARIN 100 UNIT/ML
500 SYRINGE INTRAVENOUS
Status: CANCELLED | OUTPATIENT
Start: 2019-08-14

## 2019-08-14 RX ORDER — HEPARIN 100 UNIT/ML
500 SYRINGE INTRAVENOUS
Status: COMPLETED | OUTPATIENT
Start: 2019-08-14 | End: 2019-08-14

## 2019-08-14 RX ORDER — SODIUM CHLORIDE 0.9 % (FLUSH) 0.9 %
10 SYRINGE (ML) INJECTION
Status: COMPLETED | OUTPATIENT
Start: 2019-08-14 | End: 2019-08-14

## 2019-08-14 RX ORDER — SODIUM CHLORIDE 0.9 % (FLUSH) 0.9 %
10 SYRINGE (ML) INJECTION
Status: DISCONTINUED | OUTPATIENT
Start: 2019-08-14 | End: 2019-08-14 | Stop reason: HOSPADM

## 2019-08-14 RX ORDER — HEPARIN 100 UNIT/ML
500 SYRINGE INTRAVENOUS
Status: DISCONTINUED | OUTPATIENT
Start: 2019-08-14 | End: 2019-08-14 | Stop reason: HOSPADM

## 2019-08-14 RX ORDER — FAMOTIDINE 10 MG/ML
20 INJECTION INTRAVENOUS
Status: COMPLETED | OUTPATIENT
Start: 2019-08-14 | End: 2019-08-14

## 2019-08-14 RX ORDER — SODIUM CHLORIDE 0.9 % (FLUSH) 0.9 %
10 SYRINGE (ML) INJECTION
Status: CANCELLED | OUTPATIENT
Start: 2019-08-14

## 2019-08-14 RX ORDER — EPINEPHRINE 0.3 MG/.3ML
0.3 INJECTION SUBCUTANEOUS ONCE AS NEEDED
Status: DISCONTINUED | OUTPATIENT
Start: 2019-08-14 | End: 2019-08-14 | Stop reason: HOSPADM

## 2019-08-14 RX ORDER — DIPHENHYDRAMINE HYDROCHLORIDE 50 MG/ML
50 INJECTION INTRAMUSCULAR; INTRAVENOUS ONCE AS NEEDED
Status: DISCONTINUED | OUTPATIENT
Start: 2019-08-14 | End: 2019-08-14 | Stop reason: HOSPADM

## 2019-08-14 RX ADMIN — Medication 10 ML: at 10:08

## 2019-08-14 RX ADMIN — HEPARIN 500 UNITS: 100 SYRINGE at 10:08

## 2019-08-14 RX ADMIN — DIPHENHYDRAMINE HYDROCHLORIDE 25 MG: 50 INJECTION INTRAMUSCULAR; INTRAVENOUS at 12:08

## 2019-08-14 RX ADMIN — DEXAMETHASONE SODIUM PHOSPHATE 10 MG: 4 INJECTION, SOLUTION INTRA-ARTICULAR; INTRALESIONAL; INTRAMUSCULAR; INTRAVENOUS; SOFT TISSUE at 11:08

## 2019-08-14 RX ADMIN — HEPARIN 500 UNITS: 100 SYRINGE at 01:08

## 2019-08-14 RX ADMIN — Medication 10 ML: at 01:08

## 2019-08-14 RX ADMIN — SODIUM CHLORIDE: 0.9 INJECTION, SOLUTION INTRAVENOUS at 11:08

## 2019-08-14 RX ADMIN — PACLITAXEL 186 MG: 6 INJECTION, SOLUTION INTRAVENOUS at 12:08

## 2019-08-14 RX ADMIN — FAMOTIDINE 20 MG: 10 INJECTION INTRAVENOUS at 11:08

## 2019-08-14 NOTE — NURSING
[facility]accesses easily with good blood return-blood to lab-line flushed and leftaccessed for chemo today.

## 2019-08-14 NOTE — PLAN OF CARE
Problem: Adult Inpatient Plan of Care  Goal: Plan of Care Review  Outcome: Ongoing (interventions implemented as appropriate)  Pt tolerated taxol without complications. VSS. No s/s of reaction. Instructed to contact MD with any questions. PAC heparin locked and de-accessed. AVS given to patient.

## 2019-08-19 DIAGNOSIS — I10 HYPERTENSION, UNSPECIFIED TYPE: ICD-10-CM

## 2019-08-19 RX ORDER — AMLODIPINE BESYLATE 10 MG/1
TABLET ORAL
Qty: 30 TABLET | Refills: 10 | Status: SHIPPED | OUTPATIENT
Start: 2019-08-19 | End: 2020-08-19 | Stop reason: SDUPTHER

## 2019-08-21 ENCOUNTER — OFFICE VISIT (OUTPATIENT)
Dept: HEMATOLOGY/ONCOLOGY | Facility: CLINIC | Age: 44
End: 2019-08-21
Payer: MEDICAID

## 2019-08-21 ENCOUNTER — INFUSION (OUTPATIENT)
Dept: INFUSION THERAPY | Facility: HOSPITAL | Age: 44
End: 2019-08-21
Attending: INTERNAL MEDICINE
Payer: MEDICAID

## 2019-08-21 VITALS
BODY MASS INDEX: 40.43 KG/M2 | SYSTOLIC BLOOD PRESSURE: 140 MMHG | OXYGEN SATURATION: 97 % | WEIGHT: 315 LBS | DIASTOLIC BLOOD PRESSURE: 85 MMHG | TEMPERATURE: 98 F | HEIGHT: 74 IN | HEART RATE: 72 BPM | RESPIRATION RATE: 18 BRPM

## 2019-08-21 VITALS — HEART RATE: 76 BPM | DIASTOLIC BLOOD PRESSURE: 82 MMHG | RESPIRATION RATE: 18 BRPM | SYSTOLIC BLOOD PRESSURE: 142 MMHG

## 2019-08-21 DIAGNOSIS — T45.1X5A CHEMOTHERAPY-INDUCED NEUROPATHY: ICD-10-CM

## 2019-08-21 DIAGNOSIS — T45.1X5A CHEMOTHERAPY-INDUCED NEUROPATHY: Primary | ICD-10-CM

## 2019-08-21 DIAGNOSIS — Z17.1 MALIGNANT NEOPLASM INVOLVING BOTH NIPPLE AND AREOLA OF RIGHT BREAST IN MALE, ESTROGEN RECEPTOR NEGATIVE: Primary | ICD-10-CM

## 2019-08-21 DIAGNOSIS — D50.9 MICROCYTIC ANEMIA: ICD-10-CM

## 2019-08-21 DIAGNOSIS — G62.0 CHEMOTHERAPY-INDUCED NEUROPATHY: Primary | ICD-10-CM

## 2019-08-21 DIAGNOSIS — C50.021 MALIGNANT NEOPLASM INVOLVING BOTH NIPPLE AND AREOLA OF RIGHT BREAST IN MALE, ESTROGEN RECEPTOR NEGATIVE: Primary | ICD-10-CM

## 2019-08-21 DIAGNOSIS — G62.0 CHEMOTHERAPY-INDUCED NEUROPATHY: ICD-10-CM

## 2019-08-21 DIAGNOSIS — C50.021 MALIGNANT NEOPLASM INVOLVING BOTH NIPPLE AND AREOLA OF RIGHT BREAST IN MALE, ESTROGEN RECEPTOR NEGATIVE: ICD-10-CM

## 2019-08-21 DIAGNOSIS — Z17.1 MALIGNANT NEOPLASM INVOLVING BOTH NIPPLE AND AREOLA OF RIGHT BREAST IN MALE, ESTROGEN RECEPTOR NEGATIVE: ICD-10-CM

## 2019-08-21 DIAGNOSIS — F43.23 ADJUSTMENT DISORDER WITH MIXED ANXIETY AND DEPRESSED MOOD: ICD-10-CM

## 2019-08-21 LAB
ALBUMIN SERPL BCP-MCNC: 3.8 G/DL (ref 3.5–5.2)
ALP SERPL-CCNC: 70 U/L (ref 55–135)
ALT SERPL W/O P-5'-P-CCNC: 35 U/L (ref 10–44)
ANION GAP SERPL CALC-SCNC: 10 MMOL/L (ref 8–16)
AST SERPL-CCNC: 22 U/L (ref 10–40)
BILIRUB SERPL-MCNC: 0.7 MG/DL (ref 0.1–1)
BUN SERPL-MCNC: 10 MG/DL (ref 6–20)
CALCIUM SERPL-MCNC: 9.4 MG/DL (ref 8.7–10.5)
CHLORIDE SERPL-SCNC: 105 MMOL/L (ref 95–110)
CO2 SERPL-SCNC: 25 MMOL/L (ref 23–29)
CREAT SERPL-MCNC: 1.1 MG/DL (ref 0.5–1.4)
ERYTHROCYTE [DISTWIDTH] IN BLOOD BY AUTOMATED COUNT: 17.8 % (ref 11.5–14.5)
EST. GFR  (AFRICAN AMERICAN): >60 ML/MIN/1.73 M^2
EST. GFR  (NON AFRICAN AMERICAN): >60 ML/MIN/1.73 M^2
GLUCOSE SERPL-MCNC: 99 MG/DL (ref 70–110)
HCT VFR BLD AUTO: 30.4 % (ref 40–54)
HGB BLD-MCNC: 9.6 G/DL (ref 14–18)
IMM GRANULOCYTES # BLD AUTO: 0.05 K/UL (ref 0–0.04)
MCH RBC QN AUTO: 26.7 PG (ref 27–31)
MCHC RBC AUTO-ENTMCNC: 31.6 G/DL (ref 32–36)
MCV RBC AUTO: 85 FL (ref 82–98)
NEUTROPHILS # BLD AUTO: 3.2 K/UL (ref 1.8–7.7)
PLATELET # BLD AUTO: 179 K/UL (ref 150–350)
PMV BLD AUTO: 11.3 FL (ref 9.2–12.9)
POTASSIUM SERPL-SCNC: 4.3 MMOL/L (ref 3.5–5.1)
PROT SERPL-MCNC: 7.2 G/DL (ref 6–8.4)
RBC # BLD AUTO: 3.59 M/UL (ref 4.6–6.2)
SODIUM SERPL-SCNC: 140 MMOL/L (ref 136–145)
WBC # BLD AUTO: 5.45 K/UL (ref 3.9–12.7)

## 2019-08-21 PROCEDURE — 99215 OFFICE O/P EST HI 40 MIN: CPT | Mod: S$PBB,,, | Performed by: INTERNAL MEDICINE

## 2019-08-21 PROCEDURE — 99215 PR OFFICE/OUTPT VISIT, EST, LEVL V, 40-54 MIN: ICD-10-PCS | Mod: S$PBB,,, | Performed by: INTERNAL MEDICINE

## 2019-08-21 PROCEDURE — 63600175 PHARM REV CODE 636 W HCPCS: Performed by: INTERNAL MEDICINE

## 2019-08-21 PROCEDURE — 99999 PR PBB SHADOW E&M-EST. PATIENT-LVL III: ICD-10-PCS | Mod: PBBFAC,,, | Performed by: INTERNAL MEDICINE

## 2019-08-21 PROCEDURE — 85027 COMPLETE CBC AUTOMATED: CPT

## 2019-08-21 PROCEDURE — 99213 OFFICE O/P EST LOW 20 MIN: CPT | Mod: PBBFAC | Performed by: INTERNAL MEDICINE

## 2019-08-21 PROCEDURE — 96375 TX/PRO/DX INJ NEW DRUG ADDON: CPT

## 2019-08-21 PROCEDURE — 99999 PR PBB SHADOW E&M-EST. PATIENT-LVL III: CPT | Mod: PBBFAC,,, | Performed by: INTERNAL MEDICINE

## 2019-08-21 PROCEDURE — A4216 STERILE WATER/SALINE, 10 ML: HCPCS | Performed by: INTERNAL MEDICINE

## 2019-08-21 PROCEDURE — 80053 COMPREHEN METABOLIC PANEL: CPT

## 2019-08-21 PROCEDURE — 96413 CHEMO IV INFUSION 1 HR: CPT

## 2019-08-21 PROCEDURE — 25000003 PHARM REV CODE 250: Performed by: INTERNAL MEDICINE

## 2019-08-21 PROCEDURE — S0028 INJECTION, FAMOTIDINE, 20 MG: HCPCS | Performed by: INTERNAL MEDICINE

## 2019-08-21 PROCEDURE — 96367 TX/PROPH/DG ADDL SEQ IV INF: CPT

## 2019-08-21 RX ORDER — FAMOTIDINE 10 MG/ML
20 INJECTION INTRAVENOUS
Status: COMPLETED | OUTPATIENT
Start: 2019-08-21 | End: 2019-08-21

## 2019-08-21 RX ORDER — SODIUM CHLORIDE 0.9 % (FLUSH) 0.9 %
10 SYRINGE (ML) INJECTION
Status: CANCELLED | OUTPATIENT
Start: 2019-08-26

## 2019-08-21 RX ORDER — HEPARIN 100 UNIT/ML
500 SYRINGE INTRAVENOUS
Status: CANCELLED | OUTPATIENT
Start: 2019-08-21

## 2019-08-21 RX ORDER — FAMOTIDINE 10 MG/ML
20 INJECTION INTRAVENOUS
Status: CANCELLED | OUTPATIENT
Start: 2019-08-26

## 2019-08-21 RX ORDER — DIPHENHYDRAMINE HYDROCHLORIDE 50 MG/ML
50 INJECTION INTRAMUSCULAR; INTRAVENOUS ONCE AS NEEDED
Status: CANCELLED | OUTPATIENT
Start: 2019-08-26

## 2019-08-21 RX ORDER — SODIUM CHLORIDE 0.9 % (FLUSH) 0.9 %
10 SYRINGE (ML) INJECTION
Status: COMPLETED | OUTPATIENT
Start: 2019-08-21 | End: 2019-08-21

## 2019-08-21 RX ORDER — HEPARIN 100 UNIT/ML
500 SYRINGE INTRAVENOUS
Status: COMPLETED | OUTPATIENT
Start: 2019-08-21 | End: 2019-08-21

## 2019-08-21 RX ORDER — SODIUM CHLORIDE 0.9 % (FLUSH) 0.9 %
10 SYRINGE (ML) INJECTION
Status: DISCONTINUED | OUTPATIENT
Start: 2019-08-21 | End: 2019-08-21 | Stop reason: HOSPADM

## 2019-08-21 RX ORDER — DIPHENHYDRAMINE HYDROCHLORIDE 50 MG/ML
50 INJECTION INTRAMUSCULAR; INTRAVENOUS ONCE AS NEEDED
Status: CANCELLED | OUTPATIENT
Start: 2019-08-21

## 2019-08-21 RX ORDER — SODIUM CHLORIDE 0.9 % (FLUSH) 0.9 %
10 SYRINGE (ML) INJECTION
Status: CANCELLED | OUTPATIENT
Start: 2019-08-21

## 2019-08-21 RX ORDER — EPINEPHRINE 0.3 MG/.3ML
0.3 INJECTION SUBCUTANEOUS ONCE AS NEEDED
Status: CANCELLED | OUTPATIENT
Start: 2019-08-26

## 2019-08-21 RX ORDER — EPINEPHRINE 0.3 MG/.3ML
0.3 INJECTION SUBCUTANEOUS ONCE AS NEEDED
Status: CANCELLED | OUTPATIENT
Start: 2019-08-21

## 2019-08-21 RX ORDER — HEPARIN 100 UNIT/ML
500 SYRINGE INTRAVENOUS
Status: CANCELLED | OUTPATIENT
Start: 2019-08-26

## 2019-08-21 RX ORDER — HEPARIN 100 UNIT/ML
500 SYRINGE INTRAVENOUS
Status: DISCONTINUED | OUTPATIENT
Start: 2019-08-21 | End: 2019-08-21 | Stop reason: HOSPADM

## 2019-08-21 RX ORDER — FAMOTIDINE 10 MG/ML
20 INJECTION INTRAVENOUS
Status: CANCELLED | OUTPATIENT
Start: 2019-08-21

## 2019-08-21 RX ORDER — DIPHENHYDRAMINE HYDROCHLORIDE 50 MG/ML
50 INJECTION INTRAMUSCULAR; INTRAVENOUS ONCE AS NEEDED
Status: DISCONTINUED | OUTPATIENT
Start: 2019-08-21 | End: 2019-08-21 | Stop reason: HOSPADM

## 2019-08-21 RX ORDER — EPINEPHRINE 0.3 MG/.3ML
0.3 INJECTION SUBCUTANEOUS ONCE AS NEEDED
Status: DISCONTINUED | OUTPATIENT
Start: 2019-08-21 | End: 2019-08-21 | Stop reason: HOSPADM

## 2019-08-21 RX ADMIN — DIPHENHYDRAMINE HYDROCHLORIDE 25 MG: 50 INJECTION, SOLUTION INTRAMUSCULAR; INTRAVENOUS at 03:08

## 2019-08-21 RX ADMIN — HEPARIN SODIUM (PORCINE) LOCK FLUSH IV SOLN 100 UNIT/ML 500 UNITS: 100 SOLUTION at 02:08

## 2019-08-21 RX ADMIN — FAMOTIDINE 20 MG: 10 INJECTION INTRAVENOUS at 03:08

## 2019-08-21 RX ADMIN — Medication 10 ML: at 05:08

## 2019-08-21 RX ADMIN — SODIUM CHLORIDE: 0.9 INJECTION, SOLUTION INTRAVENOUS at 03:08

## 2019-08-21 RX ADMIN — Medication 10 ML: at 02:08

## 2019-08-21 RX ADMIN — DEXAMETHASONE SODIUM PHOSPHATE 10 MG: 4 INJECTION, SOLUTION INTRA-ARTICULAR; INTRALESIONAL; INTRAMUSCULAR; INTRAVENOUS; SOFT TISSUE at 03:08

## 2019-08-21 RX ADMIN — HEPARIN 500 UNITS: 100 SYRINGE at 05:08

## 2019-08-21 RX ADMIN — PACLITAXEL 186 MG: 6 INJECTION, SOLUTION INTRAVENOUS at 04:08

## 2019-08-21 NOTE — PROGRESS NOTES
History:     Reason For Consultation:   1. Stage IB ([id number]) triple negative invasive ductal carcinoma with lobular features of right breast, retroareolar, Grade 2, Ki67 80%, diagnosed 5/2019    Records Obtained: Records of the patients history including those obtained from the referring provider were reviewed and summarized in detail.    HPI:   Paul Li Jr. presents for follow up of breast cancer and cycle 5 weekly Taxol.   Generally feeling well but cold hands and feet.   Insomnia still present uses medications occasionally.   Mild - neuropathy but stable.     History: I reviewed, confirmed and updated history (past medical, social, family) as necessary.    Medications    Current Outpatient Medications:     amLODIPine (NORVASC) 10 MG tablet, TAKE 1 TABLET(10 MG) BY MOUTH EVERY DAY, Disp: 30 tablet, Rfl: 10    gabapentin (NEURONTIN) 300 MG capsule, Take 1 capsule (300 mg total) by mouth 2 (two) times daily., Disp: 60 capsule, Rfl: 2    HYDROcodone-acetaminophen (NORCO) 5-325 mg per tablet, Take 1 tablet by mouth every 6 (six) hours as needed for Pain., Disp: 8 tablet, Rfl: 0    ibuprofen (ADVIL,MOTRIN) 800 MG tablet, Take 800 mg by mouth 3 (three) times daily., Disp: , Rfl:     lidocaine-prilocaine (EMLA) cream, Apply to affected area 45 minutes before port access, Disp: 30 g, Rfl: 1    losartan-hydrochlorothiazide 100-25 mg (HYZAAR) 100-25 mg per tablet, TAKE 1 TABLET BY MOUTH EVERY DAY, Disp: 90 tablet, Rfl: 0    ondansetron (ZOFRAN-ODT) 8 MG TbDL, Take 1 tablet (8 mg total) by mouth every 12 (twelve) hours as needed., Disp: 20 tablet, Rfl: 1    temazepam (RESTORIL) 15 mg Cap, Take 1 capsule (15 mg total) by mouth nightly as needed., Disp: 30 capsule, Rfl: 1    colchicine 0.6 mg tablet, Take 1 tablet (0.6 mg total) by mouth 2 (two) times daily as needed., Disp: 9 tablet, Rfl: 11  No current facility-administered medications for this visit.   Allergies  Review of patient's allergies indicates:  No  "Known Allergies  Review of Systems  Review of Systems   Constitutional: Positive for fatigue. Negative for activity change, appetite change, chills, fever and unexpected weight change.   HENT: Negative for congestion, hearing loss, nosebleeds, sinus pressure and trouble swallowing.    Eyes: Negative for pain, discharge, itching and visual disturbance.   Respiratory: Negative for cough, chest tightness and shortness of breath.    Cardiovascular: Negative for chest pain, palpitations and leg swelling.   Gastrointestinal: Negative for abdominal distention, abdominal pain, blood in stool, constipation, diarrhea, nausea, rectal pain and vomiting.   Endocrine: Negative for heat intolerance and polydipsia.   Genitourinary: Negative for difficulty urinating, dysuria, flank pain, frequency, hematuria and urgency.   Musculoskeletal: Negative for arthralgias, back pain and neck pain.   Skin: Negative for color change, pallor and rash.   Neurological: Positive for numbness. Negative for dizziness and headaches.   Hematological: Negative for adenopathy. Does not bruise/bleed easily.   Psychiatric/Behavioral: Negative for confusion, decreased concentration and sleep disturbance. The patient is not nervous/anxious.         Objective     Objective:     Vitals:    08/21/19 1435   Weight: (!) 188.4 kg (415 lb 5.6 oz)   Height: 6' 1.5" (1.867 m)   Body mass index is 54.06 kg/m².    Body surface area is 3.13 meters squared.  Physical Exam   Constitutional: He is oriented to person, place, and time. He appears well-developed and well-nourished. No distress.   HENT:   Head: Normocephalic and atraumatic.   Mouth/Throat: Oropharynx is clear and moist and mucous membranes are normal. No oral lesions.   Eyes: Conjunctivae and EOM are normal. Right eye exhibits no discharge. Left eye exhibits no discharge. No scleral icterus.   Cardiovascular: Normal rate, regular rhythm, S1 normal, S2 normal and normal heart sounds.   Pulmonary/Chest: Effort " normal and breath sounds normal. No respiratory distress. He has no wheezes. He has no rhonchi. He has no rales.   Right breast with nipple retraction and skin thickening greatly improved; no palpable mass - unchanged.   Left mediport   Abdominal: Soft. Normal appearance and bowel sounds are normal. There is no tenderness.   Musculoskeletal: Normal range of motion. He exhibits edema (right compression hose in place LE). He exhibits no deformity.   Neurological: He is alert and oriented to person, place, and time. He has normal strength.   Skin: Skin is warm, dry and intact. No pallor.   Darkening on palms   Psychiatric: His behavior is normal. Thought content normal.     Initial Breast exam (pre-chemo): Right breast with nipple retraction and skin thickening around nipple without well defined mass behind the skin thickening.     Labs and Imaging  Results for orders placed or performed in visit on 08/21/19   CBC Oncology   Result Value Ref Range    WBC 5.45 3.90 - 12.70 K/uL    RBC 3.59 (L) 4.60 - 6.20 M/uL    Hemoglobin 9.6 (L) 14.0 - 18.0 g/dL    Hematocrit 30.4 (L) 40.0 - 54.0 %    Mean Corpuscular Volume 85 82 - 98 fL    Mean Corpuscular Hemoglobin 26.7 (L) 27.0 - 31.0 pg    Mean Corpuscular Hemoglobin Conc 31.6 (L) 32.0 - 36.0 g/dL    RDW 17.8 (H) 11.5 - 14.5 %    Platelets 179 150 - 350 K/uL    MPV 11.3 9.2 - 12.9 fL    Gran # (ANC) 3.2 1.8 - 7.7 K/uL    Immature Grans (Abs) 0.05 (H) 0.00 - 0.04 K/uL       Assessment     Assessment:   1. Stage IB ([id number]) invasive ductal carcinoma with lobular features of right breast, retroareolar, ER neg, [place] neg, Her2 neg, Grade 2, Ki67 80%, diagnosed 5/2019   * Genetics negative   * 5/27/19 initiated neoadjuvant ddAC followed by weekly Taxol.    * US after ddAC showing improving disease; post anthracycline echo ordered   * Cycle 5 neoadjuvant weekly Taxol. Tumor shrinking. Dose reduce Taxol due to neuropathy. Will receive 60 mg/m2 starting with cycle 3.      2. Microcytic  anemia   * Has anemia as baseline but worsened with chemotherapy   * Monitoring. Stable.     3. Adjustment disorder/depression.    * Dr Nunez; improving.     4. Drug induced constipation   * Continue senna S.     5. HTN per PCP    6. Insomnia   * Tried melatonin without success   * Restoril 15 mg nightly for insomnia. Gabapentin which he's getting for PN may help too   * Mild, still present    7. Chemo neuropathy   * Mild, stable. Gabapentin 300 mg bid. Decrease dose of taxol, cycle 3 forward. Monitor.       Plan:   Cycle 5 neoadjuvant Taxol, continue weekly with dose reduction. MD in 2 weeks.   Continue Restoril for insomnia  Continue gabapentin 300 mg bid.     Future Appointments   Date Time Provider Department Center   8/21/2019  3:00 PM Richa Bahena MD [facility] HEM ONC [facility]   8/21/2019  4:00 PM [facility], CHEMO [facility] CHEMO [facility]   9/20/2019  7:40 AM Kareem Arroyo MD [facility]

## 2019-08-22 ENCOUNTER — DOCUMENTATION ONLY (OUTPATIENT)
Dept: HEMATOLOGY/ONCOLOGY | Facility: CLINIC | Age: 44
End: 2019-08-22

## 2019-08-22 NOTE — PROGRESS NOTES
Received call this afternoon from patient, requesting additional Boost supplements - strawberry and vanilla flavors. Explained to him that I will ask my coworker Lorelei Coles LCSW, to order the supplements (she is trained on ordering through [employer]), which will be delivered to her office usually within several business days after she places the order, and one of us will contact him by phone when they are available for him to . Sent message to Lorelei via Epic. Cost will be covered through the [employer] Patient Assistance Fund. Patient reported no other needs or concerns today.

## 2019-08-28 ENCOUNTER — HOSPITAL ENCOUNTER (OUTPATIENT)
Dept: CARDIOLOGY | Facility: CLINIC | Age: 44
Discharge: HOME | End: 2019-08-28
Attending: INTERNAL MEDICINE
Payer: MEDICAID

## 2019-08-28 ENCOUNTER — INFUSION (OUTPATIENT)
Dept: INFUSION THERAPY | Facility: HOSPITAL | Age: 44
End: 2019-08-28
Attending: INTERNAL MEDICINE
Payer: MEDICAID

## 2019-08-28 VITALS
OXYGEN SATURATION: 100 % | DIASTOLIC BLOOD PRESSURE: 81 MMHG | HEIGHT: 74 IN | BODY MASS INDEX: 40.43 KG/M2 | WEIGHT: 315 LBS | TEMPERATURE: 98 F | SYSTOLIC BLOOD PRESSURE: 159 MMHG | RESPIRATION RATE: 18 BRPM | HEART RATE: 75 BPM

## 2019-08-28 VITALS
HEIGHT: 74 IN | SYSTOLIC BLOOD PRESSURE: 159 MMHG | WEIGHT: 315 LBS | BODY MASS INDEX: 40.43 KG/M2 | HEART RATE: 74 BPM | DIASTOLIC BLOOD PRESSURE: 81 MMHG

## 2019-08-28 DIAGNOSIS — Z17.1 MALIGNANT NEOPLASM INVOLVING BOTH NIPPLE AND AREOLA OF RIGHT BREAST IN MALE, ESTROGEN RECEPTOR NEGATIVE: ICD-10-CM

## 2019-08-28 DIAGNOSIS — C50.021 MALIGNANT NEOPLASM INVOLVING BOTH NIPPLE AND AREOLA OF RIGHT BREAST IN MALE, ESTROGEN RECEPTOR NEGATIVE: Primary | ICD-10-CM

## 2019-08-28 DIAGNOSIS — Z51.81 ENCOUNTER FOR THERAPEUTIC DRUG MONITORING: ICD-10-CM

## 2019-08-28 DIAGNOSIS — Z17.1 MALIGNANT NEOPLASM INVOLVING BOTH NIPPLE AND AREOLA OF RIGHT BREAST IN MALE, ESTROGEN RECEPTOR NEGATIVE: Primary | ICD-10-CM

## 2019-08-28 DIAGNOSIS — C50.021 MALIGNANT NEOPLASM INVOLVING BOTH NIPPLE AND AREOLA OF RIGHT BREAST IN MALE, ESTROGEN RECEPTOR NEGATIVE: ICD-10-CM

## 2019-08-28 LAB
ALBUMIN SERPL BCP-MCNC: 3.8 G/DL (ref 3.5–5.2)
ALP SERPL-CCNC: 71 U/L (ref 55–135)
ALT SERPL W/O P-5'-P-CCNC: 26 U/L (ref 10–44)
ANION GAP SERPL CALC-SCNC: 8 MMOL/L (ref 8–16)
AST SERPL-CCNC: 18 U/L (ref 10–40)
BILIRUB SERPL-MCNC: 0.7 MG/DL (ref 0.1–1)
BUN SERPL-MCNC: 9 MG/DL (ref 6–20)
CALCIUM SERPL-MCNC: 9.2 MG/DL (ref 8.7–10.5)
CHLORIDE SERPL-SCNC: 104 MMOL/L (ref 95–110)
CO2 SERPL-SCNC: 27 MMOL/L (ref 23–29)
CREAT SERPL-MCNC: 1.2 MG/DL (ref 0.5–1.4)
ERYTHROCYTE [DISTWIDTH] IN BLOOD BY AUTOMATED COUNT: 17.2 % (ref 11.5–14.5)
EST. GFR  (AFRICAN AMERICAN): >60 ML/MIN/1.73 M^2
EST. GFR  (NON AFRICAN AMERICAN): >60 ML/MIN/1.73 M^2
GLUCOSE SERPL-MCNC: 121 MG/DL (ref 70–110)
HCT VFR BLD AUTO: 30.9 % (ref 40–54)
HGB BLD-MCNC: 9.6 G/DL (ref 14–18)
IMM GRANULOCYTES # BLD AUTO: 0.05 K/UL (ref 0–0.04)
MCH RBC QN AUTO: 26.9 PG (ref 27–31)
MCHC RBC AUTO-ENTMCNC: 31.1 G/DL (ref 32–36)
MCV RBC AUTO: 87 FL (ref 82–98)
NEUTROPHILS # BLD AUTO: 3.2 K/UL (ref 1.8–7.7)
PLATELET # BLD AUTO: 213 K/UL (ref 150–350)
PMV BLD AUTO: 10.8 FL (ref 9.2–12.9)
POTASSIUM SERPL-SCNC: 3.9 MMOL/L (ref 3.5–5.1)
PROT SERPL-MCNC: 7.4 G/DL (ref 6–8.4)
RBC # BLD AUTO: 3.57 M/UL (ref 4.6–6.2)
SODIUM SERPL-SCNC: 139 MMOL/L (ref 136–145)
WBC # BLD AUTO: 5.42 K/UL (ref 3.9–12.7)

## 2019-08-28 PROCEDURE — 63600175 PHARM REV CODE 636 W HCPCS: Performed by: INTERNAL MEDICINE

## 2019-08-28 PROCEDURE — 96375 TX/PRO/DX INJ NEW DRUG ADDON: CPT | Mod: 59

## 2019-08-28 PROCEDURE — 93308 TTE F-UP OR LMTD: CPT

## 2019-08-28 PROCEDURE — 93308 TRANSTHORACIC ECHO (TTE) LIMITED (CUPID ONLY): ICD-10-PCS | Mod: 26,,, | Performed by: INTERNAL MEDICINE

## 2019-08-28 PROCEDURE — 93308 TTE F-UP OR LMTD: CPT | Mod: 26,,, | Performed by: INTERNAL MEDICINE

## 2019-08-28 PROCEDURE — 96413 CHEMO IV INFUSION 1 HR: CPT

## 2019-08-28 PROCEDURE — A4216 STERILE WATER/SALINE, 10 ML: HCPCS | Performed by: INTERNAL MEDICINE

## 2019-08-28 PROCEDURE — 96367 TX/PROPH/DG ADDL SEQ IV INF: CPT

## 2019-08-28 PROCEDURE — 80053 COMPREHEN METABOLIC PANEL: CPT

## 2019-08-28 PROCEDURE — 25000003 PHARM REV CODE 250: Performed by: INTERNAL MEDICINE

## 2019-08-28 PROCEDURE — 85027 COMPLETE CBC AUTOMATED: CPT

## 2019-08-28 PROCEDURE — S0028 INJECTION, FAMOTIDINE, 20 MG: HCPCS | Performed by: INTERNAL MEDICINE

## 2019-08-28 RX ORDER — EPINEPHRINE 0.3 MG/.3ML
0.3 INJECTION SUBCUTANEOUS ONCE AS NEEDED
Status: DISCONTINUED | OUTPATIENT
Start: 2019-08-28 | End: 2019-08-28 | Stop reason: HOSPADM

## 2019-08-28 RX ORDER — SODIUM CHLORIDE 0.9 % (FLUSH) 0.9 %
10 SYRINGE (ML) INJECTION
Status: CANCELLED | OUTPATIENT
Start: 2019-08-28

## 2019-08-28 RX ORDER — DIPHENHYDRAMINE HYDROCHLORIDE 50 MG/ML
50 INJECTION INTRAMUSCULAR; INTRAVENOUS ONCE AS NEEDED
Status: DISCONTINUED | OUTPATIENT
Start: 2019-08-28 | End: 2019-08-28 | Stop reason: HOSPADM

## 2019-08-28 RX ORDER — SODIUM CHLORIDE 0.9 % (FLUSH) 0.9 %
10 SYRINGE (ML) INJECTION
Status: COMPLETED | OUTPATIENT
Start: 2019-08-28 | End: 2019-08-28

## 2019-08-28 RX ORDER — HEPARIN 100 UNIT/ML
500 SYRINGE INTRAVENOUS
Status: CANCELLED | OUTPATIENT
Start: 2019-08-28

## 2019-08-28 RX ORDER — HEPARIN 100 UNIT/ML
500 SYRINGE INTRAVENOUS
Status: COMPLETED | OUTPATIENT
Start: 2019-08-28 | End: 2019-08-28

## 2019-08-28 RX ORDER — FAMOTIDINE 10 MG/ML
20 INJECTION INTRAVENOUS
Status: COMPLETED | OUTPATIENT
Start: 2019-08-28 | End: 2019-08-28

## 2019-08-28 RX ORDER — HEPARIN 100 UNIT/ML
500 SYRINGE INTRAVENOUS
Status: DISCONTINUED | OUTPATIENT
Start: 2019-08-28 | End: 2019-08-28 | Stop reason: HOSPADM

## 2019-08-28 RX ORDER — SODIUM CHLORIDE 0.9 % (FLUSH) 0.9 %
10 SYRINGE (ML) INJECTION
Status: DISCONTINUED | OUTPATIENT
Start: 2019-08-28 | End: 2019-08-28 | Stop reason: HOSPADM

## 2019-08-28 RX ADMIN — SODIUM CHLORIDE: 0.9 INJECTION, SOLUTION INTRAVENOUS at 04:08

## 2019-08-28 RX ADMIN — PACLITAXEL 186 MG: 6 INJECTION, SOLUTION INTRAVENOUS at 04:08

## 2019-08-28 RX ADMIN — FAMOTIDINE 20 MG: 10 INJECTION INTRAVENOUS at 04:08

## 2019-08-28 RX ADMIN — DEXAMETHASONE SODIUM PHOSPHATE 10 MG: 4 INJECTION, SOLUTION INTRA-ARTICULAR; INTRALESIONAL; INTRAMUSCULAR; INTRAVENOUS; SOFT TISSUE at 04:08

## 2019-08-28 RX ADMIN — HEPARIN SODIUM (PORCINE) LOCK FLUSH IV SOLN 100 UNIT/ML 500 UNITS: 100 SOLUTION at 05:08

## 2019-08-28 RX ADMIN — HEPARIN SODIUM (PORCINE) LOCK FLUSH IV SOLN 100 UNIT/ML 500 UNITS: 100 SOLUTION at 02:08

## 2019-08-28 RX ADMIN — Medication 10 ML: at 05:08

## 2019-08-28 RX ADMIN — Medication 10 ML: at 02:08

## 2019-08-28 RX ADMIN — DIPHENHYDRAMINE HYDROCHLORIDE 25 MG: 50 INJECTION INTRAMUSCULAR; INTRAVENOUS at 04:08

## 2019-08-28 NOTE — PLAN OF CARE
Problem: Adult Inpatient Plan of Care  Goal: Plan of Care Review  Outcome: Ongoing (interventions implemented as appropriate)  Pt tolerated C6 taxol without adverse effects. VSS. Provided AVS & verbalized understanding of RTC date. DC with family ambulating independently.

## 2019-08-28 NOTE — PLAN OF CARE
Problem: Anemia (Chemotherapy Effects)  Goal: Anemia Symptom Improvement  Outcome: Ongoing (interventions implemented as appropriate)  Labs , hx, and medications reviewed. Assessment completed. Discussed plan of care with patient. Patient in agreement. VSS.  Chair reclined and warm blanket and snack offered. Will cont to monitor

## 2019-08-29 LAB
ASCENDING AORTA: 3 CM
BSA FOR ECHO PROCEDURE: 3.12 M2
CV ECHO LV RWT: 0.48 CM
DOP CALC LVOT AREA: 4.9 CM2
DOP CALC LVOT DIAMETER: 2.51 CM
DOP CALC LVOT PEAK VEL: 1.32 M/S
DOP CALC LVOT STROKE VOLUME: 119.68 CM3
DOP CALCLVOT PEAK VEL VTI: 24.2 CM
E WAVE DECELERATION TIME: 216.32 MSEC
E/A RATIO: 1.93
E/E' RATIO: 11.79 M/S
ECHO LV POSTERIOR WALL: 1.14 CM (ref 0.6–1.1)
FRACTIONAL SHORTENING: 38 % (ref 28–44)
INTERVENTRICULAR SEPTUM: 1.15 CM (ref 0.6–1.1)
IVRT: 0.07 MSEC
LA MAJOR: 5.79 CM
LA MINOR: 5.83 CM
LA WIDTH: 4.2 CM
LEFT ATRIUM SIZE: 4.2 CM
LEFT ATRIUM VOLUME INDEX: 29.5 ML/M2
LEFT ATRIUM VOLUME: 87.11 CM3
LEFT INTERNAL DIMENSION IN SYSTOLE: 2.95 CM (ref 2.1–4)
LEFT VENTRICLE DIASTOLIC VOLUME INDEX: 35.81 ML/M2
LEFT VENTRICLE DIASTOLIC VOLUME: 105.61 ML
LEFT VENTRICLE MASS INDEX: 69 G/M2
LEFT VENTRICLE SYSTOLIC VOLUME INDEX: 11.4 ML/M2
LEFT VENTRICLE SYSTOLIC VOLUME: 33.66 ML
LEFT VENTRICULAR INTERNAL DIMENSION IN DIASTOLE: 4.76 CM (ref 3.5–6)
LEFT VENTRICULAR MASS: 202.4 G
LV LATERAL E/E' RATIO: 11.2 M/S
LV SEPTAL E/E' RATIO: 12.44 M/S
MV PEAK A VEL: 0.58 M/S
MV PEAK E VEL: 1.12 M/S
PULM VEIN S/D RATIO: 1.06
PV PEAK D VEL: 0.5 M/S
PV PEAK S VEL: 0.53 M/S
RA MAJOR: 5.5 CM
RA PRESSURE: 3 MMHG
RA WIDTH: 4.72 CM
RETIRED EF AND QEF - SEE NOTES: 69 %
RV TISSUE DOPPLER FREE WALL SYSTOLIC VELOCITY 1 (APICAL 4 CHAMBER VIEW): 16.11 CM/S
SINUS: 3.32 CM
STJ: 2.74 CM
TDI LATERAL: 0.1 M/S
TDI SEPTAL: 0.09 M/S
TDI: 0.1 M/S
TRICUSPID ANNULAR PLANE SYSTOLIC EXCURSION: 2.46 CM

## 2019-09-04 ENCOUNTER — OFFICE VISIT (OUTPATIENT)
Dept: HEMATOLOGY/ONCOLOGY | Facility: CLINIC | Age: 44
End: 2019-09-04
Payer: MEDICAID

## 2019-09-04 ENCOUNTER — INFUSION (OUTPATIENT)
Dept: INFUSION THERAPY | Facility: HOSPITAL | Age: 44
End: 2019-09-04
Attending: INTERNAL MEDICINE
Payer: MEDICAID

## 2019-09-04 VITALS
SYSTOLIC BLOOD PRESSURE: 144 MMHG | RESPIRATION RATE: 18 BRPM | WEIGHT: 315 LBS | HEART RATE: 68 BPM | TEMPERATURE: 98 F | DIASTOLIC BLOOD PRESSURE: 82 MMHG | OXYGEN SATURATION: 97 % | HEIGHT: 74 IN | BODY MASS INDEX: 40.43 KG/M2

## 2019-09-04 VITALS — RESPIRATION RATE: 18 BRPM | HEART RATE: 63 BPM | SYSTOLIC BLOOD PRESSURE: 131 MMHG | DIASTOLIC BLOOD PRESSURE: 59 MMHG

## 2019-09-04 DIAGNOSIS — N52.8 OTHER MALE ERECTILE DYSFUNCTION: ICD-10-CM

## 2019-09-04 DIAGNOSIS — C50.021 MALIGNANT NEOPLASM INVOLVING BOTH NIPPLE AND AREOLA OF RIGHT BREAST IN MALE, ESTROGEN RECEPTOR NEGATIVE: Primary | ICD-10-CM

## 2019-09-04 DIAGNOSIS — T45.1X5A CHEMOTHERAPY-INDUCED NEUROPATHY: ICD-10-CM

## 2019-09-04 DIAGNOSIS — Z17.1 MALIGNANT NEOPLASM INVOLVING BOTH NIPPLE AND AREOLA OF RIGHT BREAST IN MALE, ESTROGEN RECEPTOR NEGATIVE: Primary | ICD-10-CM

## 2019-09-04 DIAGNOSIS — G62.0 CHEMOTHERAPY-INDUCED NEUROPATHY: ICD-10-CM

## 2019-09-04 DIAGNOSIS — D50.9 MICROCYTIC ANEMIA: ICD-10-CM

## 2019-09-04 DIAGNOSIS — F43.23 ADJUSTMENT DISORDER WITH MIXED ANXIETY AND DEPRESSED MOOD: ICD-10-CM

## 2019-09-04 DIAGNOSIS — I10 ESSENTIAL HYPERTENSION: ICD-10-CM

## 2019-09-04 LAB
ALBUMIN SERPL BCP-MCNC: 3.7 G/DL (ref 3.5–5.2)
ALP SERPL-CCNC: 69 U/L (ref 55–135)
ALT SERPL W/O P-5'-P-CCNC: 35 U/L (ref 10–44)
ANION GAP SERPL CALC-SCNC: 7 MMOL/L (ref 8–16)
AST SERPL-CCNC: 19 U/L (ref 10–40)
BILIRUB SERPL-MCNC: 0.6 MG/DL (ref 0.1–1)
BUN SERPL-MCNC: 17 MG/DL (ref 6–20)
CALCIUM SERPL-MCNC: 9.6 MG/DL (ref 8.7–10.5)
CHLORIDE SERPL-SCNC: 108 MMOL/L (ref 95–110)
CO2 SERPL-SCNC: 24 MMOL/L (ref 23–29)
CREAT SERPL-MCNC: 1.5 MG/DL (ref 0.5–1.4)
ERYTHROCYTE [DISTWIDTH] IN BLOOD BY AUTOMATED COUNT: 16.9 % (ref 11.5–14.5)
EST. GFR  (AFRICAN AMERICAN): >60 ML/MIN/1.73 M^2
EST. GFR  (NON AFRICAN AMERICAN): 57 ML/MIN/1.73 M^2
GLUCOSE SERPL-MCNC: 107 MG/DL (ref 70–110)
HCT VFR BLD AUTO: 30.6 % (ref 40–54)
HGB BLD-MCNC: 9.8 G/DL (ref 14–18)
IMM GRANULOCYTES # BLD AUTO: 0.06 K/UL (ref 0–0.04)
MCH RBC QN AUTO: 27.2 PG (ref 27–31)
MCHC RBC AUTO-ENTMCNC: 32 G/DL (ref 32–36)
MCV RBC AUTO: 85 FL (ref 82–98)
NEUTROPHILS # BLD AUTO: 3.1 K/UL (ref 1.8–7.7)
PLATELET # BLD AUTO: 210 K/UL (ref 150–350)
PMV BLD AUTO: 11 FL (ref 9.2–12.9)
POTASSIUM SERPL-SCNC: 4.2 MMOL/L (ref 3.5–5.1)
PROT SERPL-MCNC: 7.3 G/DL (ref 6–8.4)
RBC # BLD AUTO: 3.6 M/UL (ref 4.6–6.2)
SODIUM SERPL-SCNC: 139 MMOL/L (ref 136–145)
WBC # BLD AUTO: 5.18 K/UL (ref 3.9–12.7)

## 2019-09-04 PROCEDURE — 99215 OFFICE O/P EST HI 40 MIN: CPT | Mod: S$PBB,,, | Performed by: INTERNAL MEDICINE

## 2019-09-04 PROCEDURE — 96413 CHEMO IV INFUSION 1 HR: CPT

## 2019-09-04 PROCEDURE — 63600175 PHARM REV CODE 636 W HCPCS: Performed by: INTERNAL MEDICINE

## 2019-09-04 PROCEDURE — 85027 COMPLETE CBC AUTOMATED: CPT

## 2019-09-04 PROCEDURE — 80053 COMPREHEN METABOLIC PANEL: CPT

## 2019-09-04 PROCEDURE — 99213 OFFICE O/P EST LOW 20 MIN: CPT | Mod: PBBFAC | Performed by: INTERNAL MEDICINE

## 2019-09-04 PROCEDURE — 25000003 PHARM REV CODE 250: Performed by: INTERNAL MEDICINE

## 2019-09-04 PROCEDURE — A4216 STERILE WATER/SALINE, 10 ML: HCPCS | Performed by: INTERNAL MEDICINE

## 2019-09-04 PROCEDURE — 96367 TX/PROPH/DG ADDL SEQ IV INF: CPT

## 2019-09-04 PROCEDURE — S0028 INJECTION, FAMOTIDINE, 20 MG: HCPCS | Performed by: INTERNAL MEDICINE

## 2019-09-04 PROCEDURE — 99215 PR OFFICE/OUTPT VISIT, EST, LEVL V, 40-54 MIN: ICD-10-PCS | Mod: S$PBB,,, | Performed by: INTERNAL MEDICINE

## 2019-09-04 PROCEDURE — 99999 PR PBB SHADOW E&M-EST. PATIENT-LVL III: ICD-10-PCS | Mod: PBBFAC,,, | Performed by: INTERNAL MEDICINE

## 2019-09-04 PROCEDURE — 96375 TX/PRO/DX INJ NEW DRUG ADDON: CPT

## 2019-09-04 PROCEDURE — 99999 PR PBB SHADOW E&M-EST. PATIENT-LVL III: CPT | Mod: PBBFAC,,, | Performed by: INTERNAL MEDICINE

## 2019-09-04 RX ORDER — DIPHENHYDRAMINE HYDROCHLORIDE 50 MG/ML
50 INJECTION INTRAMUSCULAR; INTRAVENOUS ONCE AS NEEDED
Status: CANCELLED | OUTPATIENT
Start: 2019-09-04

## 2019-09-04 RX ORDER — FAMOTIDINE 10 MG/ML
20 INJECTION INTRAVENOUS
Status: COMPLETED | OUTPATIENT
Start: 2019-09-04 | End: 2019-09-04

## 2019-09-04 RX ORDER — SODIUM CHLORIDE 0.9 % (FLUSH) 0.9 %
10 SYRINGE (ML) INJECTION
Status: CANCELLED | OUTPATIENT
Start: 2019-09-04

## 2019-09-04 RX ORDER — EPINEPHRINE 0.3 MG/.3ML
0.3 INJECTION SUBCUTANEOUS ONCE AS NEEDED
Status: CANCELLED | OUTPATIENT
Start: 2019-09-09

## 2019-09-04 RX ORDER — DIPHENHYDRAMINE HYDROCHLORIDE 50 MG/ML
50 INJECTION INTRAMUSCULAR; INTRAVENOUS ONCE AS NEEDED
Status: DISCONTINUED | OUTPATIENT
Start: 2019-09-04 | End: 2019-09-04 | Stop reason: HOSPADM

## 2019-09-04 RX ORDER — HEPARIN 100 UNIT/ML
500 SYRINGE INTRAVENOUS
Status: CANCELLED | OUTPATIENT
Start: 2019-09-09

## 2019-09-04 RX ORDER — FAMOTIDINE 10 MG/ML
20 INJECTION INTRAVENOUS
Status: CANCELLED | OUTPATIENT
Start: 2019-09-04

## 2019-09-04 RX ORDER — HEPARIN 100 UNIT/ML
500 SYRINGE INTRAVENOUS
Status: CANCELLED | OUTPATIENT
Start: 2019-09-04

## 2019-09-04 RX ORDER — SODIUM CHLORIDE 0.9 % (FLUSH) 0.9 %
10 SYRINGE (ML) INJECTION
Status: DISCONTINUED | OUTPATIENT
Start: 2019-09-04 | End: 2019-09-04 | Stop reason: HOSPADM

## 2019-09-04 RX ORDER — SODIUM CHLORIDE 0.9 % (FLUSH) 0.9 %
10 SYRINGE (ML) INJECTION
Status: COMPLETED | OUTPATIENT
Start: 2019-09-04 | End: 2019-09-04

## 2019-09-04 RX ORDER — HEPARIN 100 UNIT/ML
500 SYRINGE INTRAVENOUS
Status: COMPLETED | OUTPATIENT
Start: 2019-09-04 | End: 2019-09-04

## 2019-09-04 RX ORDER — EPINEPHRINE 0.3 MG/.3ML
0.3 INJECTION SUBCUTANEOUS ONCE AS NEEDED
Status: CANCELLED | OUTPATIENT
Start: 2019-09-04

## 2019-09-04 RX ORDER — TADALAFIL 5 MG/1
10 TABLET ORAL DAILY PRN
Qty: 20 TABLET | Refills: 1 | Status: SHIPPED | OUTPATIENT
Start: 2019-09-04 | End: 2019-10-08 | Stop reason: SDUPTHER

## 2019-09-04 RX ORDER — SODIUM CHLORIDE 0.9 % (FLUSH) 0.9 %
10 SYRINGE (ML) INJECTION
Status: CANCELLED | OUTPATIENT
Start: 2019-09-09

## 2019-09-04 RX ORDER — FAMOTIDINE 10 MG/ML
20 INJECTION INTRAVENOUS
Status: CANCELLED | OUTPATIENT
Start: 2019-09-09

## 2019-09-04 RX ORDER — HEPARIN 100 UNIT/ML
500 SYRINGE INTRAVENOUS
Status: DISCONTINUED | OUTPATIENT
Start: 2019-09-04 | End: 2019-09-04 | Stop reason: HOSPADM

## 2019-09-04 RX ORDER — EPINEPHRINE 0.3 MG/.3ML
0.3 INJECTION SUBCUTANEOUS ONCE AS NEEDED
Status: DISCONTINUED | OUTPATIENT
Start: 2019-09-04 | End: 2019-09-04 | Stop reason: HOSPADM

## 2019-09-04 RX ORDER — DIPHENHYDRAMINE HYDROCHLORIDE 50 MG/ML
50 INJECTION INTRAMUSCULAR; INTRAVENOUS ONCE AS NEEDED
Status: CANCELLED | OUTPATIENT
Start: 2019-09-09

## 2019-09-04 RX ADMIN — FAMOTIDINE 20 MG: 10 INJECTION INTRAVENOUS at 04:09

## 2019-09-04 RX ADMIN — HEPARIN SODIUM (PORCINE) LOCK FLUSH IV SOLN 100 UNIT/ML 500 UNITS: 100 SOLUTION at 05:09

## 2019-09-04 RX ADMIN — SODIUM CHLORIDE: 0.9 INJECTION, SOLUTION INTRAVENOUS at 04:09

## 2019-09-04 RX ADMIN — DEXAMETHASONE SODIUM PHOSPHATE 10 MG: 4 INJECTION, SOLUTION INTRAMUSCULAR; INTRAVENOUS at 04:09

## 2019-09-04 RX ADMIN — Medication 10 ML: at 05:09

## 2019-09-04 RX ADMIN — PACLITAXEL 186 MG: 6 INJECTION, SOLUTION INTRAVENOUS at 04:09

## 2019-09-04 RX ADMIN — DIPHENHYDRAMINE HYDROCHLORIDE 25 MG: 50 INJECTION INTRAMUSCULAR; INTRAVENOUS at 04:09

## 2019-09-04 RX ADMIN — Medication 10 ML: at 02:09

## 2019-09-04 RX ADMIN — HEPARIN SODIUM (PORCINE) LOCK FLUSH IV SOLN 100 UNIT/ML 500 UNITS: 100 SOLUTION at 02:09

## 2019-09-04 NOTE — NURSING
1420-Left chest port accessed using sterile technique and blood specimen collected, pt tolerated well. Blood specimen sent to lab. Port flushed with normal saline and heparin, secured, and Curos cap applied to luer locks. Port left accessed at time of discharge for treatment today. Pt discharged with no distress noted, ambulating independently, accompanied by wife.

## 2019-09-04 NOTE — PLAN OF CARE
Problem: Adult Inpatient Plan of Care  Goal: Patient-Specific Goal (Individualization)  Outcome: Ongoing (interventions implemented as appropriate)  1600-Labs , hx, and medications reviewed, patient was seen by Md prior to arrival. Assessment completed. Discussed plan of care with patient. Patient in agreement. Chair reclined and warm blanket and snack offered.

## 2019-09-04 NOTE — PROGRESS NOTES
History:     Reason For Consultation:   1. Stage IB ([id number]) triple negative invasive ductal carcinoma with lobular features of right breast, retroareolar, Grade 2, Ki67 80%, diagnosed 5/2019    Records Obtained: Records of the patients history including those obtained from the referring provider were reviewed and summarized in detail.    HPI:   Paul Li Jr. presents for follow up of breast cancer and cycle 7 weekly Taxol.   Neuropathy - stable - controlled with gabapentin.   Insomnia - improved  Feels cold often.   Repeat echo good.   Complaints of erectile dysfunction.     History: I reviewed, confirmed and updated history (past medical, social, family) as necessary.    Medications    Current Outpatient Medications:     amLODIPine (NORVASC) 10 MG tablet, TAKE 1 TABLET(10 MG) BY MOUTH EVERY DAY, Disp: 30 tablet, Rfl: 10    gabapentin (NEURONTIN) 300 MG capsule, Take 1 capsule (300 mg total) by mouth 2 (two) times daily., Disp: 60 capsule, Rfl: 2    HYDROcodone-acetaminophen (NORCO) 5-325 mg per tablet, Take 1 tablet by mouth every 6 (six) hours as needed for Pain., Disp: 8 tablet, Rfl: 0    ibuprofen (ADVIL,MOTRIN) 800 MG tablet, Take 800 mg by mouth 3 (three) times daily., Disp: , Rfl:     lidocaine-prilocaine (EMLA) cream, Apply to affected area 45 minutes before port access, Disp: 30 g, Rfl: 1    losartan-hydrochlorothiazide 100-25 mg (HYZAAR) 100-25 mg per tablet, TAKE 1 TABLET BY MOUTH EVERY DAY, Disp: 90 tablet, Rfl: 0    ondansetron (ZOFRAN-ODT) 8 MG TbDL, Take 1 tablet (8 mg total) by mouth every 12 (twelve) hours as needed., Disp: 20 tablet, Rfl: 1    colchicine 0.6 mg tablet, Take 1 tablet (0.6 mg total) by mouth 2 (two) times daily as needed., Disp: 9 tablet, Rfl: 11  No current facility-administered medications for this visit.   Allergies  Review of patient's allergies indicates:  No Known Allergies  Review of Systems  Review of Systems   Constitutional: Positive for fatigue. Negative for  "activity change, appetite change, chills, fever and unexpected weight change.   HENT: Negative for congestion, hearing loss, nosebleeds, sinus pressure and trouble swallowing.    Eyes: Negative for pain, discharge, itching and visual disturbance.   Respiratory: Negative for cough, chest tightness and shortness of breath.    Cardiovascular: Negative for chest pain, palpitations and leg swelling.   Gastrointestinal: Negative for abdominal distention, abdominal pain, blood in stool, constipation, diarrhea, nausea, rectal pain and vomiting.   Endocrine: Negative for heat intolerance and polydipsia.   Genitourinary: Negative for difficulty urinating, dysuria, flank pain, frequency, hematuria and urgency.   Musculoskeletal: Negative for arthralgias, back pain and neck pain.   Skin: Negative for color change, pallor and rash.   Neurological: Positive for numbness. Negative for dizziness and headaches.   Hematological: Negative for adenopathy. Does not bruise/bleed easily.   Psychiatric/Behavioral: Negative for confusion, decreased concentration and sleep disturbance. The patient is not nervous/anxious.         Objective     Objective:     Vitals:    09/04/19 1437   BP: (!) 144/82   BP Location: Left arm   Patient Position: Sitting   BP Method: Large (Automatic)   Pulse: 68   Resp: 18   Temp: 97.7 °F (36.5 °C)   TempSrc: Oral   SpO2: 97%   Weight: (!) 189.3 kg (417 lb 5.3 oz)   Height: 6' 1.5" (1.867 m)   Body mass index is 54.31 kg/m².    Body surface area is 3.13 meters squared.  Physical Exam   Constitutional: He is oriented to person, place, and time. He appears well-developed and well-nourished. No distress.   HENT:   Head: Normocephalic and atraumatic.   Mouth/Throat: Oropharynx is clear and moist and mucous membranes are normal. No oral lesions.   Eyes: Conjunctivae and EOM are normal. Right eye exhibits no discharge. Left eye exhibits no discharge. No scleral icterus.   Cardiovascular: Normal rate, regular rhythm, " S1 normal, S2 normal and normal heart sounds.   Pulmonary/Chest: Effort normal and breath sounds normal. No respiratory distress. He has no wheezes. He has no rhonchi. He has no rales.   Right breast with nipple retraction and skin thickening greatly improved; no palpable mass - stable   Left mediport   Abdominal: Soft. Normal appearance and bowel sounds are normal. There is no tenderness.   Musculoskeletal: Normal range of motion. He exhibits no edema or deformity.   Neurological: He is alert and oriented to person, place, and time. He has normal strength.   Skin: Skin is warm, dry and intact. No pallor.   Darkening on palms   Psychiatric: His behavior is normal. Thought content normal.     Initial Breast exam (pre-chemo): Right breast with nipple retraction and skin thickening around nipple without well defined mass behind the skin thickening.     Labs and Imaging  Results for orders placed or performed in visit on 09/04/19   CBC Oncology   Result Value Ref Range    WBC 5.18 3.90 - 12.70 K/uL    RBC 3.60 (L) 4.60 - 6.20 M/uL    Hemoglobin 9.8 (L) 14.0 - 18.0 g/dL    Hematocrit 30.6 (L) 40.0 - 54.0 %    Mean Corpuscular Volume 85 82 - 98 fL    Mean Corpuscular Hemoglobin 27.2 27.0 - 31.0 pg    Mean Corpuscular Hemoglobin Conc 32.0 32.0 - 36.0 g/dL    RDW 16.9 (H) 11.5 - 14.5 %    Platelets 210 150 - 350 K/uL    MPV 11.0 9.2 - 12.9 fL    Gran # (ANC) 3.1 1.8 - 7.7 K/uL    Immature Grans (Abs) 0.06 (H) 0.00 - 0.04 K/uL       Assessment     Assessment:   1. Stage IB ([id number]) invasive ductal carcinoma with lobular features of right breast, retroareolar, ER neg, MS neg, Her2 neg, Grade 2, Ki67 80%, diagnosed 5/2019   * Genetics negative   * 5/27/19 initiated neoadjuvant ddAC followed by weekly Taxol.    * US after ddAC showing improving disease; post anthracycline echo ordered   * Cycle 7 neoadjuvant weekly Taxol. Tumor with nice response. Dose reduce Taxol due to neuropathy. Will receive 60 mg/m2 from cycle 3  onward.    2. Microcytic anemia   * Has anemia as baseline but worsened with chemotherapy   * Monitoring. Stable.     3. Adjustment disorder/depression.    * Dr Nunez; improving.     4. Drug induced constipation   * Continue senna S.     5. HTN per PCP    6. Insomnia   * Tried melatonin without success   * Restoril 15 mg nightly for insomnia. Gabapentin which he's getting for PN may help too   * Improving.     7. Chemo neuropathy   * Mild, stable. Gabapentin 300 mg bid. Decrease dose of taxol, cycle 3 forward. Monitor.     8. Erectile dysfunction   * Cialis    Plan:   Cycle 7 neoadjuvant Taxol, continue weekly with dose reduction. MD in 2 weeks.   Continue Restoril for insomnia  Continue gabapentin 300 mg bid.   Cialis    Future Appointments   Date Time Provider Department Center   9/4/2019  3:00 PM Richa Bahena MD [facility] [place]   9/4/2019  3:30 PM [facility] [place]   9/11/2019  2:15 PM INJECTION, [facility] [place]   9/11/2019  3:30 PM [facility] [place]   9/18/2019  2:00 PM INJE[facility], [facility] [place]   9/18/2019  3:00 PM Richa Bahena MD [facility] [place]   9/18/2019  3:30 PM [facility] [place]   9/20/2019  7:40 AM Kareem Arroyo MD [facility]

## 2019-09-04 NOTE — PLAN OF CARE
Problem: Adult Inpatient Plan of Care  Goal: Plan of Care Review  Outcome: Ongoing (interventions implemented as appropriate)  1801-Patient tolerated treatment well. Discharged without complaints or S/S of adverse event. AVS given.  Instructed to call provider for any questions or concerns.

## 2019-09-11 ENCOUNTER — INFUSION (OUTPATIENT)
Dept: INFUSION THERAPY | Facility: HOSPITAL | Age: 44
End: 2019-09-11
Attending: INTERNAL MEDICINE
Payer: MEDICAID

## 2019-09-11 VITALS
TEMPERATURE: 98 F | HEART RATE: 75 BPM | DIASTOLIC BLOOD PRESSURE: 52 MMHG | RESPIRATION RATE: 18 BRPM | SYSTOLIC BLOOD PRESSURE: 110 MMHG

## 2019-09-11 DIAGNOSIS — C50.021 MALIGNANT NEOPLASM INVOLVING BOTH NIPPLE AND AREOLA OF RIGHT BREAST IN MALE, ESTROGEN RECEPTOR NEGATIVE: Primary | ICD-10-CM

## 2019-09-11 DIAGNOSIS — Z17.1 MALIGNANT NEOPLASM INVOLVING BOTH NIPPLE AND AREOLA OF RIGHT BREAST IN MALE, ESTROGEN RECEPTOR NEGATIVE: Primary | ICD-10-CM

## 2019-09-11 LAB
ALBUMIN SERPL BCP-MCNC: 3.6 G/DL (ref 3.5–5.2)
ALP SERPL-CCNC: 67 U/L (ref 55–135)
ALT SERPL W/O P-5'-P-CCNC: 33 U/L (ref 10–44)
ANION GAP SERPL CALC-SCNC: 8 MMOL/L (ref 8–16)
AST SERPL-CCNC: 19 U/L (ref 10–40)
BILIRUB SERPL-MCNC: 0.6 MG/DL (ref 0.1–1)
BUN SERPL-MCNC: 9 MG/DL (ref 6–20)
CALCIUM SERPL-MCNC: 9.2 MG/DL (ref 8.7–10.5)
CHLORIDE SERPL-SCNC: 104 MMOL/L (ref 95–110)
CO2 SERPL-SCNC: 26 MMOL/L (ref 23–29)
CREAT SERPL-MCNC: 1.1 MG/DL (ref 0.5–1.4)
ERYTHROCYTE [DISTWIDTH] IN BLOOD BY AUTOMATED COUNT: 16.9 % (ref 11.5–14.5)
EST. GFR  (AFRICAN AMERICAN): >60 ML/MIN/1.73 M^2
EST. GFR  (NON AFRICAN AMERICAN): >60 ML/MIN/1.73 M^2
GLUCOSE SERPL-MCNC: 184 MG/DL (ref 70–110)
HCT VFR BLD AUTO: 32.9 % (ref 40–54)
HGB BLD-MCNC: 10.2 G/DL (ref 14–18)
IMM GRANULOCYTES # BLD AUTO: 0.03 K/UL (ref 0–0.04)
MCH RBC QN AUTO: 26.9 PG (ref 27–31)
MCHC RBC AUTO-ENTMCNC: 31 G/DL (ref 32–36)
MCV RBC AUTO: 87 FL (ref 82–98)
NEUTROPHILS # BLD AUTO: 3.2 K/UL (ref 1.8–7.7)
PLATELET # BLD AUTO: 279 K/UL (ref 150–350)
PMV BLD AUTO: 10.8 FL (ref 9.2–12.9)
POTASSIUM SERPL-SCNC: 4 MMOL/L (ref 3.5–5.1)
PROT SERPL-MCNC: 7 G/DL (ref 6–8.4)
RBC # BLD AUTO: 3.79 M/UL (ref 4.6–6.2)
SODIUM SERPL-SCNC: 138 MMOL/L (ref 136–145)
WBC # BLD AUTO: 4.76 K/UL (ref 3.9–12.7)

## 2019-09-11 PROCEDURE — 96367 TX/PROPH/DG ADDL SEQ IV INF: CPT

## 2019-09-11 PROCEDURE — S0028 INJECTION, FAMOTIDINE, 20 MG: HCPCS | Performed by: INTERNAL MEDICINE

## 2019-09-11 PROCEDURE — 80053 COMPREHEN METABOLIC PANEL: CPT

## 2019-09-11 PROCEDURE — 96413 CHEMO IV INFUSION 1 HR: CPT

## 2019-09-11 PROCEDURE — 25000003 PHARM REV CODE 250: Performed by: INTERNAL MEDICINE

## 2019-09-11 PROCEDURE — 63600175 PHARM REV CODE 636 W HCPCS: Performed by: INTERNAL MEDICINE

## 2019-09-11 PROCEDURE — 96375 TX/PRO/DX INJ NEW DRUG ADDON: CPT

## 2019-09-11 PROCEDURE — 85027 COMPLETE CBC AUTOMATED: CPT

## 2019-09-11 PROCEDURE — A4216 STERILE WATER/SALINE, 10 ML: HCPCS | Performed by: INTERNAL MEDICINE

## 2019-09-11 RX ORDER — SODIUM CHLORIDE 0.9 % (FLUSH) 0.9 %
10 SYRINGE (ML) INJECTION
Status: COMPLETED | OUTPATIENT
Start: 2019-09-11 | End: 2019-09-11

## 2019-09-11 RX ORDER — HEPARIN 100 UNIT/ML
500 SYRINGE INTRAVENOUS
Status: COMPLETED | OUTPATIENT
Start: 2019-09-11 | End: 2019-09-11

## 2019-09-11 RX ORDER — EPINEPHRINE 0.3 MG/.3ML
0.3 INJECTION SUBCUTANEOUS ONCE AS NEEDED
Status: DISCONTINUED | OUTPATIENT
Start: 2019-09-11 | End: 2019-09-11 | Stop reason: HOSPADM

## 2019-09-11 RX ORDER — HEPARIN 100 UNIT/ML
500 SYRINGE INTRAVENOUS
Status: CANCELLED | OUTPATIENT
Start: 2019-09-11

## 2019-09-11 RX ORDER — DIPHENHYDRAMINE HYDROCHLORIDE 50 MG/ML
50 INJECTION INTRAMUSCULAR; INTRAVENOUS ONCE AS NEEDED
Status: DISCONTINUED | OUTPATIENT
Start: 2019-09-11 | End: 2019-09-11 | Stop reason: HOSPADM

## 2019-09-11 RX ORDER — SODIUM CHLORIDE 0.9 % (FLUSH) 0.9 %
10 SYRINGE (ML) INJECTION
Status: DISCONTINUED | OUTPATIENT
Start: 2019-09-11 | End: 2019-09-11 | Stop reason: HOSPADM

## 2019-09-11 RX ORDER — SODIUM CHLORIDE 0.9 % (FLUSH) 0.9 %
10 SYRINGE (ML) INJECTION
Status: CANCELLED | OUTPATIENT
Start: 2019-09-11

## 2019-09-11 RX ORDER — FAMOTIDINE 10 MG/ML
20 INJECTION INTRAVENOUS
Status: COMPLETED | OUTPATIENT
Start: 2019-09-11 | End: 2019-09-11

## 2019-09-11 RX ORDER — HEPARIN 100 UNIT/ML
500 SYRINGE INTRAVENOUS
Status: DISCONTINUED | OUTPATIENT
Start: 2019-09-11 | End: 2019-09-11 | Stop reason: HOSPADM

## 2019-09-11 RX ADMIN — HEPARIN 500 UNITS: 100 SYRINGE at 05:09

## 2019-09-11 RX ADMIN — Medication 10 ML: at 02:09

## 2019-09-11 RX ADMIN — PACLITAXEL 174 MG: 6 INJECTION, SOLUTION INTRAVENOUS at 04:09

## 2019-09-11 RX ADMIN — HEPARIN SODIUM (PORCINE) LOCK FLUSH IV SOLN 100 UNIT/ML 500 UNITS: 100 SOLUTION at 02:09

## 2019-09-11 RX ADMIN — FAMOTIDINE 20 MG: 10 INJECTION INTRAVENOUS at 03:09

## 2019-09-11 RX ADMIN — SODIUM CHLORIDE: 0.9 INJECTION, SOLUTION INTRAVENOUS at 03:09

## 2019-09-11 RX ADMIN — DIPHENHYDRAMINE HYDROCHLORIDE 25 MG: 50 INJECTION INTRAMUSCULAR; INTRAVENOUS at 04:09

## 2019-09-11 RX ADMIN — DEXAMETHASONE SODIUM PHOSPHATE 10 MG: 4 INJECTION, SOLUTION INTRAMUSCULAR; INTRAVENOUS at 03:09

## 2019-09-11 NOTE — PLAN OF CARE
Problem: Adult Inpatient Plan of Care  Goal: Plan of Care Review  Outcome: Ongoing (interventions implemented as appropriate)  Pt tolerated Taxol today. NAD. Port flushed + blood return present, flushed. Hep lock and deaccesed. AVS given to pt. Discharged home. Ambulated independently with spouse.

## 2019-09-18 ENCOUNTER — OFFICE VISIT (OUTPATIENT)
Dept: HEMATOLOGY/ONCOLOGY | Facility: CLINIC | Age: 44
End: 2019-09-18
Payer: MEDICAID

## 2019-09-18 ENCOUNTER — INFUSION (OUTPATIENT)
Dept: INFUSION THERAPY | Facility: HOSPITAL | Age: 44
End: 2019-09-18
Attending: INTERNAL MEDICINE
Payer: MEDICAID

## 2019-09-18 VITALS
RESPIRATION RATE: 18 BRPM | SYSTOLIC BLOOD PRESSURE: 136 MMHG | HEART RATE: 70 BPM | TEMPERATURE: 98 F | DIASTOLIC BLOOD PRESSURE: 74 MMHG

## 2019-09-18 VITALS
WEIGHT: 315 LBS | OXYGEN SATURATION: 96 % | HEART RATE: 76 BPM | RESPIRATION RATE: 16 BRPM | SYSTOLIC BLOOD PRESSURE: 168 MMHG | HEIGHT: 74 IN | DIASTOLIC BLOOD PRESSURE: 80 MMHG | BODY MASS INDEX: 40.43 KG/M2 | TEMPERATURE: 98 F

## 2019-09-18 DIAGNOSIS — C50.021 MALIGNANT NEOPLASM INVOLVING BOTH NIPPLE AND AREOLA OF RIGHT BREAST IN MALE, ESTROGEN RECEPTOR NEGATIVE: Primary | ICD-10-CM

## 2019-09-18 DIAGNOSIS — Z17.1 MALIGNANT NEOPLASM INVOLVING BOTH NIPPLE AND AREOLA OF RIGHT BREAST IN MALE, ESTROGEN RECEPTOR NEGATIVE: Primary | ICD-10-CM

## 2019-09-18 DIAGNOSIS — G62.0 CHEMOTHERAPY-INDUCED NEUROPATHY: ICD-10-CM

## 2019-09-18 DIAGNOSIS — T45.1X5A CHEMOTHERAPY-INDUCED NEUROPATHY: ICD-10-CM

## 2019-09-18 LAB
ALBUMIN SERPL BCP-MCNC: 3.8 G/DL (ref 3.5–5.2)
ALP SERPL-CCNC: 72 U/L (ref 55–135)
ALT SERPL W/O P-5'-P-CCNC: 40 U/L (ref 10–44)
ANION GAP SERPL CALC-SCNC: 8 MMOL/L (ref 8–16)
AST SERPL-CCNC: 18 U/L (ref 10–40)
BILIRUB SERPL-MCNC: 0.7 MG/DL (ref 0.1–1)
BUN SERPL-MCNC: 10 MG/DL (ref 6–20)
CALCIUM SERPL-MCNC: 9 MG/DL (ref 8.7–10.5)
CHLORIDE SERPL-SCNC: 102 MMOL/L (ref 95–110)
CO2 SERPL-SCNC: 27 MMOL/L (ref 23–29)
CREAT SERPL-MCNC: 1.2 MG/DL (ref 0.5–1.4)
ERYTHROCYTE [DISTWIDTH] IN BLOOD BY AUTOMATED COUNT: 15.5 % (ref 11.5–14.5)
EST. GFR  (AFRICAN AMERICAN): >60 ML/MIN/1.73 M^2
EST. GFR  (NON AFRICAN AMERICAN): >60 ML/MIN/1.73 M^2
GLUCOSE SERPL-MCNC: 218 MG/DL (ref 70–110)
HCT VFR BLD AUTO: 33.1 % (ref 40–54)
HGB BLD-MCNC: 10.5 G/DL (ref 14–18)
IMM GRANULOCYTES # BLD AUTO: 0.03 K/UL (ref 0–0.04)
MCH RBC QN AUTO: 27.6 PG (ref 27–31)
MCHC RBC AUTO-ENTMCNC: 31.7 G/DL (ref 32–36)
MCV RBC AUTO: 87 FL (ref 82–98)
NEUTROPHILS # BLD AUTO: 3.2 K/UL (ref 1.8–7.7)
PLATELET # BLD AUTO: 244 K/UL (ref 150–350)
PMV BLD AUTO: 10.7 FL (ref 9.2–12.9)
POTASSIUM SERPL-SCNC: 4.2 MMOL/L (ref 3.5–5.1)
PROT SERPL-MCNC: 7.2 G/DL (ref 6–8.4)
RBC # BLD AUTO: 3.8 M/UL (ref 4.6–6.2)
SODIUM SERPL-SCNC: 137 MMOL/L (ref 136–145)
WBC # BLD AUTO: 5.17 K/UL (ref 3.9–12.7)

## 2019-09-18 PROCEDURE — 80053 COMPREHEN METABOLIC PANEL: CPT

## 2019-09-18 PROCEDURE — 25000003 PHARM REV CODE 250: Performed by: INTERNAL MEDICINE

## 2019-09-18 PROCEDURE — 99999 PR PBB SHADOW E&M-EST. PATIENT-LVL III: CPT | Mod: PBBFAC,,, | Performed by: INTERNAL MEDICINE

## 2019-09-18 PROCEDURE — S0028 INJECTION, FAMOTIDINE, 20 MG: HCPCS | Performed by: INTERNAL MEDICINE

## 2019-09-18 PROCEDURE — A4216 STERILE WATER/SALINE, 10 ML: HCPCS | Performed by: INTERNAL MEDICINE

## 2019-09-18 PROCEDURE — 63600175 PHARM REV CODE 636 W HCPCS: Performed by: INTERNAL MEDICINE

## 2019-09-18 PROCEDURE — 96367 TX/PROPH/DG ADDL SEQ IV INF: CPT

## 2019-09-18 PROCEDURE — 99999 PR PBB SHADOW E&M-EST. PATIENT-LVL III: ICD-10-PCS | Mod: PBBFAC,,, | Performed by: INTERNAL MEDICINE

## 2019-09-18 PROCEDURE — 99215 OFFICE O/P EST HI 40 MIN: CPT | Mod: S$PBB,,, | Performed by: INTERNAL MEDICINE

## 2019-09-18 PROCEDURE — 96375 TX/PRO/DX INJ NEW DRUG ADDON: CPT

## 2019-09-18 PROCEDURE — 99213 OFFICE O/P EST LOW 20 MIN: CPT | Mod: PBBFAC | Performed by: INTERNAL MEDICINE

## 2019-09-18 PROCEDURE — 85027 COMPLETE CBC AUTOMATED: CPT

## 2019-09-18 PROCEDURE — 96413 CHEMO IV INFUSION 1 HR: CPT

## 2019-09-18 PROCEDURE — 99215 PR OFFICE/OUTPT VISIT, EST, LEVL V, 40-54 MIN: ICD-10-PCS | Mod: S$PBB,,, | Performed by: INTERNAL MEDICINE

## 2019-09-18 RX ORDER — SODIUM CHLORIDE 0.9 % (FLUSH) 0.9 %
10 SYRINGE (ML) INJECTION
Status: CANCELLED | OUTPATIENT
Start: 2019-09-18

## 2019-09-18 RX ORDER — DIPHENHYDRAMINE HYDROCHLORIDE 50 MG/ML
50 INJECTION INTRAMUSCULAR; INTRAVENOUS ONCE AS NEEDED
Status: CANCELLED | OUTPATIENT
Start: 2019-09-25

## 2019-09-18 RX ORDER — EPINEPHRINE 0.3 MG/.3ML
0.3 INJECTION SUBCUTANEOUS ONCE AS NEEDED
Status: CANCELLED | OUTPATIENT
Start: 2019-09-18

## 2019-09-18 RX ORDER — FAMOTIDINE 10 MG/ML
20 INJECTION INTRAVENOUS
Status: COMPLETED | OUTPATIENT
Start: 2019-09-18 | End: 2019-09-18

## 2019-09-18 RX ORDER — GABAPENTIN 300 MG/1
300 CAPSULE ORAL 3 TIMES DAILY
Qty: 90 CAPSULE | Refills: 2 | Status: SHIPPED | OUTPATIENT
Start: 2019-09-18 | End: 2019-10-23 | Stop reason: SDUPTHER

## 2019-09-18 RX ORDER — FAMOTIDINE 10 MG/ML
20 INJECTION INTRAVENOUS
Status: CANCELLED | OUTPATIENT
Start: 2019-09-18

## 2019-09-18 RX ORDER — SODIUM CHLORIDE 0.9 % (FLUSH) 0.9 %
10 SYRINGE (ML) INJECTION
Status: CANCELLED | OUTPATIENT
Start: 2019-09-25

## 2019-09-18 RX ORDER — EPINEPHRINE 0.3 MG/.3ML
0.3 INJECTION SUBCUTANEOUS ONCE AS NEEDED
Status: DISCONTINUED | OUTPATIENT
Start: 2019-09-18 | End: 2019-09-18 | Stop reason: HOSPADM

## 2019-09-18 RX ORDER — HEPARIN 100 UNIT/ML
500 SYRINGE INTRAVENOUS
Status: DISCONTINUED | OUTPATIENT
Start: 2019-09-18 | End: 2019-09-18 | Stop reason: HOSPADM

## 2019-09-18 RX ORDER — HEPARIN 100 UNIT/ML
500 SYRINGE INTRAVENOUS
Status: CANCELLED | OUTPATIENT
Start: 2019-09-18

## 2019-09-18 RX ORDER — EPINEPHRINE 0.3 MG/.3ML
0.3 INJECTION SUBCUTANEOUS ONCE AS NEEDED
Status: CANCELLED | OUTPATIENT
Start: 2019-09-25

## 2019-09-18 RX ORDER — SODIUM CHLORIDE 0.9 % (FLUSH) 0.9 %
10 SYRINGE (ML) INJECTION
Status: DISCONTINUED | OUTPATIENT
Start: 2019-09-18 | End: 2019-09-18 | Stop reason: HOSPADM

## 2019-09-18 RX ORDER — HEPARIN 100 UNIT/ML
500 SYRINGE INTRAVENOUS
Status: CANCELLED | OUTPATIENT
Start: 2019-09-25

## 2019-09-18 RX ORDER — HEPARIN 100 UNIT/ML
500 SYRINGE INTRAVENOUS
Status: COMPLETED | OUTPATIENT
Start: 2019-09-18 | End: 2019-09-18

## 2019-09-18 RX ORDER — DIPHENHYDRAMINE HYDROCHLORIDE 50 MG/ML
50 INJECTION INTRAMUSCULAR; INTRAVENOUS ONCE AS NEEDED
Status: DISCONTINUED | OUTPATIENT
Start: 2019-09-18 | End: 2019-09-18 | Stop reason: HOSPADM

## 2019-09-18 RX ORDER — SODIUM CHLORIDE 0.9 % (FLUSH) 0.9 %
10 SYRINGE (ML) INJECTION
Status: COMPLETED | OUTPATIENT
Start: 2019-09-18 | End: 2019-09-18

## 2019-09-18 RX ORDER — DIPHENHYDRAMINE HYDROCHLORIDE 50 MG/ML
50 INJECTION INTRAMUSCULAR; INTRAVENOUS ONCE AS NEEDED
Status: CANCELLED | OUTPATIENT
Start: 2019-09-18

## 2019-09-18 RX ORDER — FAMOTIDINE 10 MG/ML
20 INJECTION INTRAVENOUS
Status: CANCELLED | OUTPATIENT
Start: 2019-09-25

## 2019-09-18 RX ADMIN — DEXAMETHASONE SODIUM PHOSPHATE 10 MG: 4 INJECTION, SOLUTION INTRAMUSCULAR; INTRAVENOUS at 03:09

## 2019-09-18 RX ADMIN — Medication 10 ML: at 02:09

## 2019-09-18 RX ADMIN — PACLITAXEL 156 MG: 6 INJECTION INTRAVENOUS at 04:09

## 2019-09-18 RX ADMIN — DIPHENHYDRAMINE HYDROCHLORIDE 25 MG: 50 INJECTION INTRAMUSCULAR; INTRAVENOUS at 04:09

## 2019-09-18 RX ADMIN — FAMOTIDINE 20 MG: 10 INJECTION INTRAVENOUS at 04:09

## 2019-09-18 RX ADMIN — HEPARIN SODIUM (PORCINE) LOCK FLUSH IV SOLN 100 UNIT/ML 500 UNITS: 100 SOLUTION at 02:09

## 2019-09-18 RX ADMIN — SODIUM CHLORIDE: 0.9 INJECTION, SOLUTION INTRAVENOUS at 03:09

## 2019-09-18 RX ADMIN — HEPARIN SODIUM (PORCINE) LOCK FLUSH IV SOLN 100 UNIT/ML 500 UNITS: 100 SOLUTION at 05:09

## 2019-09-18 NOTE — Clinical Note
Please schedule 3 more taxol doses with cbc, cmp with MD in 2 and 3 weeks. Please move to Tuesdays.

## 2019-09-18 NOTE — PROGRESS NOTES
History:     Reason For Consultation:   1. Stage IB ([id number]) triple negative invasive ductal carcinoma with lobular features of right breast, retroareolar, Grade 2, Ki67 80%, diagnosed 5/2019    Records Obtained: Records of the patients history including those obtained from the referring provider were reviewed and summarized in detail.    HPI:   Paul Li Jr. presents for follow up of breast cancer and cycle 9 weekly Taxol.   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   Fingernail changes.     History: I reviewed, confirmed and updated history (past medical, social, family) as necessary.    Medications    Current Outpatient Medications:     amLODIPine (NORVASC) 10 MG tablet, TAKE 1 TABLET (10 MG) BY MOUTH EVERY DAY, Disp: 30 tablet, Rfl: 10    gabapentin (NEURONTIN) 300 MG capsule, Take 1 capsule (300 mg total) by mouth 2 (two) times daily., Disp: 60 capsule, Rfl: 2    HYDROcodone-acetaminophen (NORCO) 5-325 mg per tablet, Take 1 tablet by mouth every 6 (six) hours as needed for Pain., Disp: 8 tablet, Rfl: 0    ibuprofen (ADVIL,MOTRIN) 800 MG tablet, Take 800 mg by mouth 3 (three) times daily., Disp: , Rfl:     lidocaine-prilocaine (EMLA) cream, Apply topically to affected area 45 minutes before port access, Disp: 30 g, Rfl: 1    losartan-hydrochlorothiazide 100-25 mg (HYZAAR) 100-25 mg per tablet, TAKE 1 TABLET BY MOUTH EVERY DAY, Disp: 90 tablet, Rfl: 0    ondansetron (ZOFRAN-ODT) 8 MG TbDL, Take 1 tablet (8 mg total) by mouth every 12 (twelve) hours as needed., Disp: 20 tablet, Rfl: 1    tadalafil (CIALIS) 5 MG tablet, Take 2 tablets (10 mg total) by mouth daily as needed for Erectile Dysfunction., Disp: 20 tablet, Rfl: 1    temazepam (RESTORIL) 15 mg Cap, Take 1 capsule (15 mg total) by mouth nightly as needed., Disp: 30 capsule, Rfl: 1    colchicine 0.6 mg tablet, Take 1 tablet (0.6 mg total) by mouth 2 (two) times daily as needed.,  "Disp: 9 tablet, Rfl: 11  No current facility-administered medications for this visit.   Allergies  Review of patient's allergies indicates:  No Known Allergies  Review of Systems  Review of Systems   Constitutional: Positive for fatigue. Negative for activity change, appetite change, chills, fever and unexpected weight change.   HENT: Negative for congestion, hearing loss, nosebleeds, sinus pressure and trouble swallowing.    Eyes: Negative for pain, discharge, itching and visual disturbance.   Respiratory: Negative for cough, chest tightness and shortness of breath.    Cardiovascular: Negative for chest pain, palpitations and leg swelling.   Gastrointestinal: Negative for abdominal distention, abdominal pain, blood in stool, constipation, diarrhea, nausea, rectal pain and vomiting.   Endocrine: Negative for heat intolerance and polydipsia.   Genitourinary: Negative for difficulty urinating, dysuria, flank pain, frequency, hematuria and urgency.   Musculoskeletal: Negative for arthralgias, back pain and neck pain.   Skin: Negative for color change, pallor and rash.   Neurological: Positive for numbness. Negative for dizziness and headaches.   Hematological: Negative for adenopathy. Does not bruise/bleed easily.   Psychiatric/Behavioral: Negative for confusion, decreased concentration and sleep disturbance. The patient is not nervous/anxious.         Objective     Objective:     Vitals:    09/18/19 1439   BP: (!) 168/80   BP Location: Left arm   Patient Position: Sitting   BP Method: Large (Automatic)   Pulse: 76   Resp: 16   Temp: 98.4 °F (36.9 °C)   TempSrc: Oral   SpO2: 96%   Weight: (!) 191.6 kg (422 lb 6.4 oz)   Height: 6' 1.5" (1.867 m)   Body mass index is 54.97 kg/m².    Body surface area is 3.15 meters squared.  Physical Exam   Constitutional: He is oriented to person, place, and time. He appears well-developed and well-nourished. No distress.   HENT:   Head: Normocephalic and atraumatic.   Mouth/Throat: " Oropharynx is clear and moist and mucous membranes are normal. No oral lesions.   Eyes: Conjunctivae and EOM are normal. Right eye exhibits no discharge. Left eye exhibits no discharge. No scleral icterus.   Cardiovascular: Normal rate, regular rhythm, S1 normal, S2 normal and normal heart sounds.   Pulmonary/Chest: Effort normal and breath sounds normal. No respiratory distress. He has no wheezes. He has no rhonchi. He has no rales.   Right breast with nipple retraction and skin thickening greatly improved; no palpable mass - stable   Left mediport accessed   Abdominal: Soft. Normal appearance and bowel sounds are normal. There is no tenderness.   Musculoskeletal: Normal range of motion. He exhibits no edema or deformity.   Neurological: He is alert and oriented to person, place, and time. He has normal strength.   Skin: Skin is warm, dry and intact. No pallor.   Darkening on palms   Psychiatric: His behavior is normal. Thought content normal.     Initial Breast exam (pre-chemo): Right breast with nipple retraction and skin thickening around nipple without well defined mass behind the skin thickening.     Labs and Imaging  Results for orders placed or performed in visit on 09/18/19   CBC Oncology   Result Value Ref Range    WBC 5.17 3.90 - 12.70 K/uL    RBC 3.80 (L) 4.60 - 6.20 M/uL    Hemoglobin 10.5 (L) 14.0 - 18.0 g/dL    Hematocrit 33.1 (L) 40.0 - 54.0 %    Mean Corpuscular Volume 87 82 - 98 fL    Mean Corpuscular Hemoglobin 27.6 27.0 - 31.0 pg    Mean Corpuscular Hemoglobin Conc 31.7 (L) 32.0 - 36.0 g/dL    RDW 15.5 (H) 11.5 - 14.5 %    Platelets 244 150 - 350 K/uL    MPV 10.7 9.2 - 12.9 fL    Gran # (ANC) 3.2 1.8 - 7.7 K/uL    Immature Grans (Abs) 0.03 0.00 - 0.04 K/uL   Comprehensive metabolic panel   Result Value Ref Range    Sodium 137 136 - 145 mmol/L    Potassium 4.2 3.5 - 5.1 mmol/L    Chloride 102 95 - 110 mmol/L    CO2 27 23 - 29 mmol/L    Glucose 218 (H) 70 - 110 mg/dL    BUN, Bld 10 6 - 20 mg/dL     Creatinine 1.2 0.5 - 1.4 mg/dL    Calcium 9.0 8.7 - 10.5 mg/dL    Total Protein 7.2 6.0 - 8.4 g/dL    Albumin 3.8 3.5 - 5.2 g/dL    Total Bilirubin 0.7 0.1 - 1.0 mg/dL    Alkaline Phosphatase 72 55 - 135 U/L    AST 18 10 - 40 U/L    ALT 40 10 - 44 U/L    Anion Gap 8 8 - 16 mmol/L    eGFR if African American >60.0 >60 mL/min/1.73 m^2    eGFR if non African American >60.0 >60 mL/min/1.73 m^2       Assessment     Assessment:   1. Stage IB ([id number]) invasive ductal carcinoma with lobular features of right breast, retroareolar, ER neg, ME neg, Her2 neg, Grade 2, Ki67 80%, diagnosed 5/2019   * Genetics negative   * 5/27/19 initiated neoadjuvant ddAC followed by weekly Taxol.    * US after ddAC showing improving disease; post anthracycline echo ordered   * Cycle 9 neoadjuvant weekly Taxol. Tumor with nice response. Dose reduce Taxol due to neuropathy.     2. Microcytic anemia   * Has anemia as baseline but worsened with chemotherapy   * Monitoring. Improving.     3. Adjustment disorder/depression.    * Dr Nunez; Improving.      4. Drug induced constipation   * Continue senna S.     5. HTN per PCP    6. Insomnia   * Tried melatonin without success   * Restoril 15 mg nightly for insomnia. Gabapentin which he's getting for PN may help too   * Improving.     7. Chemo neuropathy   * Mild, Slightly progressive. Gabapentin 300 mg tid. Decrease dose of taxol, cycle 3 forward. Monitor.     8. Erectile dysfunction   * Cialis    Plan:   Cycle 9 neoadjuvant Taxol, continue weekly with dose reduction.   Continue Restoril for insomnia  Continue gabapentin 300 mg tid.       Future Appointments   Date Time Provider Department Center   9/18/2019  3:30 PM [facility]   9/20/2019  7:40 AM Kareem Arroyo MD [facility]     Please schedule 3 more taxol doses with cbc, cmp with MD in 2 and 3 weeks.

## 2019-09-18 NOTE — PLAN OF CARE
Problem: Adult Inpatient Plan of Care  Goal: Plan of Care Review  Outcome: Outcome(s) achieved Date Met: 09/18/19  Pt tolerated taxol without complications. VSS. No s/s of reaction. Instructed to contact MD with any questions. PAC heparin locked and de-accessed. AVS given to patient.

## 2019-09-25 ENCOUNTER — INFUSION (OUTPATIENT)
Dept: INFUSION THERAPY | Facility: HOSPITAL | Age: 44
End: 2019-09-25
Attending: INTERNAL MEDICINE
Payer: MEDICAID

## 2019-09-25 VITALS
HEART RATE: 80 BPM | DIASTOLIC BLOOD PRESSURE: 59 MMHG | TEMPERATURE: 98 F | RESPIRATION RATE: 18 BRPM | SYSTOLIC BLOOD PRESSURE: 127 MMHG

## 2019-09-25 DIAGNOSIS — C50.021 MALIGNANT NEOPLASM INVOLVING BOTH NIPPLE AND AREOLA OF RIGHT BREAST IN MALE, ESTROGEN RECEPTOR NEGATIVE: Primary | ICD-10-CM

## 2019-09-25 DIAGNOSIS — Z17.1 MALIGNANT NEOPLASM INVOLVING BOTH NIPPLE AND AREOLA OF RIGHT BREAST IN MALE, ESTROGEN RECEPTOR NEGATIVE: Primary | ICD-10-CM

## 2019-09-25 LAB
ALBUMIN SERPL BCP-MCNC: 3.8 G/DL (ref 3.5–5.2)
ALP SERPL-CCNC: 70 U/L (ref 55–135)
ALT SERPL W/O P-5'-P-CCNC: 42 U/L (ref 10–44)
ANION GAP SERPL CALC-SCNC: 8 MMOL/L (ref 8–16)
AST SERPL-CCNC: 22 U/L (ref 10–40)
BILIRUB SERPL-MCNC: 0.5 MG/DL (ref 0.1–1)
BUN SERPL-MCNC: 14 MG/DL (ref 6–20)
CALCIUM SERPL-MCNC: 9 MG/DL (ref 8.7–10.5)
CHLORIDE SERPL-SCNC: 106 MMOL/L (ref 95–110)
CO2 SERPL-SCNC: 26 MMOL/L (ref 23–29)
CREAT SERPL-MCNC: 1.2 MG/DL (ref 0.5–1.4)
ERYTHROCYTE [DISTWIDTH] IN BLOOD BY AUTOMATED COUNT: 15.1 % (ref 11.5–14.5)
EST. GFR  (AFRICAN AMERICAN): >60 ML/MIN/1.73 M^2
EST. GFR  (NON AFRICAN AMERICAN): >60 ML/MIN/1.73 M^2
GLUCOSE SERPL-MCNC: 229 MG/DL (ref 70–110)
HCT VFR BLD AUTO: 33.1 % (ref 40–54)
HGB BLD-MCNC: 10.4 G/DL (ref 14–18)
IMM GRANULOCYTES # BLD AUTO: 0.04 K/UL (ref 0–0.04)
MCH RBC QN AUTO: 27.4 PG (ref 27–31)
MCHC RBC AUTO-ENTMCNC: 31.4 G/DL (ref 32–36)
MCV RBC AUTO: 87 FL (ref 82–98)
NEUTROPHILS # BLD AUTO: 3 K/UL (ref 1.8–7.7)
PLATELET # BLD AUTO: 224 K/UL (ref 150–350)
PMV BLD AUTO: 11 FL (ref 9.2–12.9)
POTASSIUM SERPL-SCNC: 4.1 MMOL/L (ref 3.5–5.1)
PROT SERPL-MCNC: 7.1 G/DL (ref 6–8.4)
RBC # BLD AUTO: 3.8 M/UL (ref 4.6–6.2)
SODIUM SERPL-SCNC: 140 MMOL/L (ref 136–145)
WBC # BLD AUTO: 4.91 K/UL (ref 3.9–12.7)

## 2019-09-25 PROCEDURE — 63600175 PHARM REV CODE 636 W HCPCS: Performed by: INTERNAL MEDICINE

## 2019-09-25 PROCEDURE — 25000003 PHARM REV CODE 250: Performed by: INTERNAL MEDICINE

## 2019-09-25 PROCEDURE — S0028 INJECTION, FAMOTIDINE, 20 MG: HCPCS | Performed by: INTERNAL MEDICINE

## 2019-09-25 PROCEDURE — 96367 TX/PROPH/DG ADDL SEQ IV INF: CPT

## 2019-09-25 PROCEDURE — A4216 STERILE WATER/SALINE, 10 ML: HCPCS | Performed by: INTERNAL MEDICINE

## 2019-09-25 PROCEDURE — 85027 COMPLETE CBC AUTOMATED: CPT

## 2019-09-25 PROCEDURE — 96413 CHEMO IV INFUSION 1 HR: CPT

## 2019-09-25 PROCEDURE — 96375 TX/PRO/DX INJ NEW DRUG ADDON: CPT

## 2019-09-25 PROCEDURE — 80053 COMPREHEN METABOLIC PANEL: CPT

## 2019-09-25 RX ORDER — HEPARIN 100 UNIT/ML
500 SYRINGE INTRAVENOUS
Status: CANCELLED | OUTPATIENT
Start: 2019-09-25

## 2019-09-25 RX ORDER — DIPHENHYDRAMINE HYDROCHLORIDE 50 MG/ML
50 INJECTION INTRAMUSCULAR; INTRAVENOUS ONCE AS NEEDED
Status: DISCONTINUED | OUTPATIENT
Start: 2019-09-25 | End: 2019-09-25 | Stop reason: HOSPADM

## 2019-09-25 RX ORDER — EPINEPHRINE 0.3 MG/.3ML
0.3 INJECTION SUBCUTANEOUS ONCE AS NEEDED
Status: DISCONTINUED | OUTPATIENT
Start: 2019-09-25 | End: 2019-09-25 | Stop reason: HOSPADM

## 2019-09-25 RX ORDER — HEPARIN 100 UNIT/ML
500 SYRINGE INTRAVENOUS
Status: DISCONTINUED | OUTPATIENT
Start: 2019-09-25 | End: 2019-09-25 | Stop reason: HOSPADM

## 2019-09-25 RX ORDER — SODIUM CHLORIDE 0.9 % (FLUSH) 0.9 %
10 SYRINGE (ML) INJECTION
Status: CANCELLED | OUTPATIENT
Start: 2019-09-25

## 2019-09-25 RX ORDER — SODIUM CHLORIDE 0.9 % (FLUSH) 0.9 %
10 SYRINGE (ML) INJECTION
Status: COMPLETED | OUTPATIENT
Start: 2019-09-25 | End: 2019-09-25

## 2019-09-25 RX ORDER — HEPARIN 100 UNIT/ML
500 SYRINGE INTRAVENOUS
Status: COMPLETED | OUTPATIENT
Start: 2019-09-25 | End: 2019-09-25

## 2019-09-25 RX ORDER — FAMOTIDINE 10 MG/ML
20 INJECTION INTRAVENOUS
Status: COMPLETED | OUTPATIENT
Start: 2019-09-25 | End: 2019-09-25

## 2019-09-25 RX ORDER — SODIUM CHLORIDE 0.9 % (FLUSH) 0.9 %
10 SYRINGE (ML) INJECTION
Status: DISCONTINUED | OUTPATIENT
Start: 2019-09-25 | End: 2019-09-25 | Stop reason: HOSPADM

## 2019-09-25 RX ADMIN — Medication 10 MG: at 02:09

## 2019-09-25 RX ADMIN — SODIUM CHLORIDE: 0.9 INJECTION, SOLUTION INTRAVENOUS at 03:09

## 2019-09-25 RX ADMIN — FAMOTIDINE 20 MG: 10 INJECTION INTRAVENOUS at 02:09

## 2019-09-25 RX ADMIN — HEPARIN 500 UNITS: 100 SYRINGE at 05:09

## 2019-09-25 RX ADMIN — DIPHENHYDRAMINE HYDROCHLORIDE 25 MG: 50 INJECTION INTRAMUSCULAR; INTRAVENOUS at 03:09

## 2019-09-25 RX ADMIN — HEPARIN SODIUM (PORCINE) LOCK FLUSH IV SOLN 100 UNIT/ML 500 UNITS: 100 SOLUTION at 01:09

## 2019-09-25 RX ADMIN — Medication 10 ML: at 05:09

## 2019-09-25 RX ADMIN — PACLITAXEL 156 MG: 6 INJECTION, SOLUTION INTRAVENOUS at 03:09

## 2019-09-25 RX ADMIN — Medication 10 ML: at 01:09

## 2019-09-25 NOTE — PLAN OF CARE
Pt tolerated Taxol today. NAD. Port flushed + blood return present, flushed. Hep lock and deaccesed.AVS given to pt. Pt updated on upcoming appts. Discharged home. Ambulated independently.

## 2019-09-25 NOTE — PLAN OF CARE
Problem: Adult Inpatient Plan of Care  Goal: Optimal Comfort and Wellbeing  Intervention: Provide Person-Centered Care  Flowsheets (Taken 9/25/2019 1636)  Trust Relationship/Rapport: care explained; choices provided; emotional support provided; empathic listening provided; questions answered; questions encouraged; reassurance provided; thoughts/feelings acknowledged

## 2019-09-27 ENCOUNTER — PATIENT MESSAGE (OUTPATIENT)
Dept: HEMATOLOGY/ONCOLOGY | Facility: CLINIC | Age: 44
End: 2019-09-27

## 2019-10-01 ENCOUNTER — INFUSION (OUTPATIENT)
Dept: INFUSION THERAPY | Facility: HOSPITAL | Age: 44
End: 2019-10-01
Attending: INTERNAL MEDICINE
Payer: MEDICAID

## 2019-10-01 ENCOUNTER — OFFICE VISIT (OUTPATIENT)
Dept: HEMATOLOGY/ONCOLOGY | Facility: CLINIC | Age: 44
End: 2019-10-01
Payer: MEDICAID

## 2019-10-01 ENCOUNTER — DOCUMENTATION ONLY (OUTPATIENT)
Dept: HEMATOLOGY/ONCOLOGY | Facility: CLINIC | Age: 44
End: 2019-10-01

## 2019-10-01 VITALS
BODY MASS INDEX: 40.43 KG/M2 | HEART RATE: 80 BPM | DIASTOLIC BLOOD PRESSURE: 80 MMHG | SYSTOLIC BLOOD PRESSURE: 138 MMHG | WEIGHT: 315 LBS | RESPIRATION RATE: 18 BRPM | HEIGHT: 74 IN | TEMPERATURE: 98 F

## 2019-10-01 VITALS
OXYGEN SATURATION: 97 % | DIASTOLIC BLOOD PRESSURE: 82 MMHG | SYSTOLIC BLOOD PRESSURE: 142 MMHG | HEART RATE: 74 BPM | TEMPERATURE: 99 F | WEIGHT: 315 LBS | HEIGHT: 74 IN | RESPIRATION RATE: 18 BRPM | BODY MASS INDEX: 40.43 KG/M2

## 2019-10-01 DIAGNOSIS — G62.0 CHEMOTHERAPY-INDUCED NEUROPATHY: ICD-10-CM

## 2019-10-01 DIAGNOSIS — Z17.1 MALIGNANT NEOPLASM INVOLVING BOTH NIPPLE AND AREOLA OF RIGHT BREAST IN MALE, ESTROGEN RECEPTOR NEGATIVE: Primary | ICD-10-CM

## 2019-10-01 DIAGNOSIS — I10 ESSENTIAL HYPERTENSION: ICD-10-CM

## 2019-10-01 DIAGNOSIS — C50.021 MALIGNANT NEOPLASM INVOLVING BOTH NIPPLE AND AREOLA OF RIGHT BREAST IN MALE, ESTROGEN RECEPTOR NEGATIVE: Primary | ICD-10-CM

## 2019-10-01 DIAGNOSIS — F43.23 ADJUSTMENT DISORDER WITH MIXED ANXIETY AND DEPRESSED MOOD: ICD-10-CM

## 2019-10-01 DIAGNOSIS — D50.9 MICROCYTIC ANEMIA: ICD-10-CM

## 2019-10-01 DIAGNOSIS — T45.1X5A CHEMOTHERAPY-INDUCED NEUROPATHY: ICD-10-CM

## 2019-10-01 LAB
ALBUMIN SERPL BCP-MCNC: 4.1 G/DL (ref 3.5–5.2)
ALP SERPL-CCNC: 69 U/L (ref 55–135)
ALT SERPL W/O P-5'-P-CCNC: 43 U/L (ref 10–44)
ANION GAP SERPL CALC-SCNC: 8 MMOL/L (ref 8–16)
AST SERPL-CCNC: 19 U/L (ref 10–40)
BILIRUB SERPL-MCNC: 0.8 MG/DL (ref 0.1–1)
BUN SERPL-MCNC: 13 MG/DL (ref 6–20)
CALCIUM SERPL-MCNC: 9.3 MG/DL (ref 8.7–10.5)
CHLORIDE SERPL-SCNC: 101 MMOL/L (ref 95–110)
CO2 SERPL-SCNC: 26 MMOL/L (ref 23–29)
CREAT SERPL-MCNC: 1.2 MG/DL (ref 0.5–1.4)
ERYTHROCYTE [DISTWIDTH] IN BLOOD BY AUTOMATED COUNT: 14.4 % (ref 11.5–14.5)
EST. GFR  (AFRICAN AMERICAN): >60 ML/MIN/1.73 M^2
EST. GFR  (NON AFRICAN AMERICAN): >60 ML/MIN/1.73 M^2
GLUCOSE SERPL-MCNC: 263 MG/DL (ref 70–110)
HCT VFR BLD AUTO: 35.3 % (ref 40–54)
HGB BLD-MCNC: 10.9 G/DL (ref 14–18)
IMM GRANULOCYTES # BLD AUTO: 0.05 K/UL (ref 0–0.04)
MCH RBC QN AUTO: 26.7 PG (ref 27–31)
MCHC RBC AUTO-ENTMCNC: 30.9 G/DL (ref 32–36)
MCV RBC AUTO: 87 FL (ref 82–98)
NEUTROPHILS # BLD AUTO: 3.1 K/UL (ref 1.8–7.7)
PLATELET # BLD AUTO: 212 K/UL (ref 150–350)
PMV BLD AUTO: 11.2 FL (ref 9.2–12.9)
POTASSIUM SERPL-SCNC: 4.2 MMOL/L (ref 3.5–5.1)
PROT SERPL-MCNC: 7.6 G/DL (ref 6–8.4)
RBC # BLD AUTO: 4.08 M/UL (ref 4.6–6.2)
SODIUM SERPL-SCNC: 135 MMOL/L (ref 136–145)
WBC # BLD AUTO: 5.28 K/UL (ref 3.9–12.7)

## 2019-10-01 PROCEDURE — 25000003 PHARM REV CODE 250: Performed by: INTERNAL MEDICINE

## 2019-10-01 PROCEDURE — 96367 TX/PROPH/DG ADDL SEQ IV INF: CPT

## 2019-10-01 PROCEDURE — A4216 STERILE WATER/SALINE, 10 ML: HCPCS | Performed by: INTERNAL MEDICINE

## 2019-10-01 PROCEDURE — 99214 OFFICE O/P EST MOD 30 MIN: CPT | Mod: S$PBB,,, | Performed by: INTERNAL MEDICINE

## 2019-10-01 PROCEDURE — 96375 TX/PRO/DX INJ NEW DRUG ADDON: CPT

## 2019-10-01 PROCEDURE — 80053 COMPREHEN METABOLIC PANEL: CPT

## 2019-10-01 PROCEDURE — 99213 OFFICE O/P EST LOW 20 MIN: CPT | Mod: PBBFAC | Performed by: INTERNAL MEDICINE

## 2019-10-01 PROCEDURE — 99999 PR PBB SHADOW E&M-EST. PATIENT-LVL III: ICD-10-PCS | Mod: PBBFAC,,, | Performed by: INTERNAL MEDICINE

## 2019-10-01 PROCEDURE — 85027 COMPLETE CBC AUTOMATED: CPT

## 2019-10-01 PROCEDURE — 63600175 PHARM REV CODE 636 W HCPCS: Performed by: INTERNAL MEDICINE

## 2019-10-01 PROCEDURE — S0028 INJECTION, FAMOTIDINE, 20 MG: HCPCS | Performed by: INTERNAL MEDICINE

## 2019-10-01 PROCEDURE — 99214 PR OFFICE/OUTPT VISIT, EST, LEVL IV, 30-39 MIN: ICD-10-PCS | Mod: S$PBB,,, | Performed by: INTERNAL MEDICINE

## 2019-10-01 PROCEDURE — 99999 PR PBB SHADOW E&M-EST. PATIENT-LVL III: CPT | Mod: PBBFAC,,, | Performed by: INTERNAL MEDICINE

## 2019-10-01 PROCEDURE — 96413 CHEMO IV INFUSION 1 HR: CPT

## 2019-10-01 RX ORDER — HEPARIN 100 UNIT/ML
500 SYRINGE INTRAVENOUS
Status: CANCELLED | OUTPATIENT
Start: 2019-10-02

## 2019-10-01 RX ORDER — HEPARIN 100 UNIT/ML
500 SYRINGE INTRAVENOUS
Status: CANCELLED | OUTPATIENT
Start: 2019-10-01

## 2019-10-01 RX ORDER — DIPHENHYDRAMINE HYDROCHLORIDE 50 MG/ML
50 INJECTION INTRAMUSCULAR; INTRAVENOUS ONCE AS NEEDED
Status: DISCONTINUED | OUTPATIENT
Start: 2019-10-01 | End: 2019-10-01 | Stop reason: HOSPADM

## 2019-10-01 RX ORDER — SODIUM CHLORIDE 0.9 % (FLUSH) 0.9 %
10 SYRINGE (ML) INJECTION
Status: CANCELLED | OUTPATIENT
Start: 2019-10-01

## 2019-10-01 RX ORDER — HEPARIN 100 UNIT/ML
500 SYRINGE INTRAVENOUS
Status: COMPLETED | OUTPATIENT
Start: 2019-10-01 | End: 2019-10-01

## 2019-10-01 RX ORDER — DIPHENHYDRAMINE HYDROCHLORIDE 50 MG/ML
50 INJECTION INTRAMUSCULAR; INTRAVENOUS ONCE AS NEEDED
Status: CANCELLED | OUTPATIENT
Start: 2019-10-02

## 2019-10-01 RX ORDER — SODIUM CHLORIDE 0.9 % (FLUSH) 0.9 %
10 SYRINGE (ML) INJECTION
Status: DISCONTINUED | OUTPATIENT
Start: 2019-10-01 | End: 2019-10-01 | Stop reason: HOSPADM

## 2019-10-01 RX ORDER — SODIUM CHLORIDE 0.9 % (FLUSH) 0.9 %
10 SYRINGE (ML) INJECTION
Status: CANCELLED | OUTPATIENT
Start: 2019-10-02

## 2019-10-01 RX ORDER — HEPARIN 100 UNIT/ML
500 SYRINGE INTRAVENOUS
Status: DISCONTINUED | OUTPATIENT
Start: 2019-10-01 | End: 2019-10-01 | Stop reason: HOSPADM

## 2019-10-01 RX ORDER — EPINEPHRINE 0.3 MG/.3ML
0.3 INJECTION SUBCUTANEOUS ONCE AS NEEDED
Status: DISCONTINUED | OUTPATIENT
Start: 2019-10-01 | End: 2019-10-01 | Stop reason: HOSPADM

## 2019-10-01 RX ORDER — SODIUM CHLORIDE 0.9 % (FLUSH) 0.9 %
10 SYRINGE (ML) INJECTION
Status: COMPLETED | OUTPATIENT
Start: 2019-10-01 | End: 2019-10-01

## 2019-10-01 RX ORDER — FAMOTIDINE 10 MG/ML
20 INJECTION INTRAVENOUS
Status: CANCELLED | OUTPATIENT
Start: 2019-10-02

## 2019-10-01 RX ORDER — FAMOTIDINE 10 MG/ML
20 INJECTION INTRAVENOUS
Status: COMPLETED | OUTPATIENT
Start: 2019-10-01 | End: 2019-10-01

## 2019-10-01 RX ORDER — EPINEPHRINE 0.3 MG/.3ML
0.3 INJECTION SUBCUTANEOUS ONCE AS NEEDED
Status: CANCELLED | OUTPATIENT
Start: 2019-10-02

## 2019-10-01 RX ADMIN — DEXAMETHASONE SODIUM PHOSPHATE 10 MG: 4 INJECTION, SOLUTION INTRA-ARTICULAR; INTRALESIONAL; INTRAMUSCULAR; INTRAVENOUS; SOFT TISSUE at 02:10

## 2019-10-01 RX ADMIN — Medication 10 ML: at 04:10

## 2019-10-01 RX ADMIN — Medication 10 ML: at 01:10

## 2019-10-01 RX ADMIN — DIPHENHYDRAMINE HYDROCHLORIDE 25 MG: 50 INJECTION, SOLUTION INTRAMUSCULAR; INTRAVENOUS at 03:10

## 2019-10-01 RX ADMIN — HEPARIN SODIUM (PORCINE) LOCK FLUSH IV SOLN 100 UNIT/ML 500 UNITS: 100 SOLUTION at 01:10

## 2019-10-01 RX ADMIN — FAMOTIDINE 20 MG: 10 INJECTION INTRAVENOUS at 02:10

## 2019-10-01 RX ADMIN — HEPARIN SODIUM (PORCINE) LOCK FLUSH IV SOLN 100 UNIT/ML 500 UNITS: 100 SOLUTION at 04:10

## 2019-10-01 RX ADMIN — PACLITAXEL 156 MG: 6 INJECTION, SOLUTION INTRAVENOUS at 03:10

## 2019-10-01 RX ADMIN — SODIUM CHLORIDE: 0.9 INJECTION, SOLUTION INTRAVENOUS at 02:10

## 2019-10-01 NOTE — PROGRESS NOTES
Received call/VM message this afternoon from patient, and returned call to him. He is requesting additional Boost supplements - vanilla flavor. He reported not being able to  the last supply that was ordered for him due to car trouble. Explained to him that I will ask my coworker Lorelei Coles LCSW, if she has any available or if she has to order the supplements (she is trained on ordering through [employer]), which will be delivered to her office usually within several business days after she places the order, and one of us will contact him by phone when they are available for him to . Sent message to Lorelei via Epic. Cost will be covered through the [employer] Patient Assistance Fund. No other needs are indicated at this time.

## 2019-10-01 NOTE — PROGRESS NOTES
History:     Reason For Consultation:   1. Stage IB ([id number]) triple negative invasive ductal carcinoma with lobular features of right breast, retroareolar, Grade 2, Ki67 80%, diagnosed 5/2019    Records Obtained: Records of the patients history including those obtained from the referring provider were reviewed and summarized in detail.    HPI:   Paul Li Jr. presents for follow up of breast cancer and cycle 11 weekly Taxol.   Neuropathy - stable   Fatigue - present and progressive.   Had recent URI - feeling better today though    History: I reviewed, confirmed and updated history (past medical, social, family) as necessary.    Medications    Current Outpatient Medications:     amLODIPine (NORVASC) 10 MG tablet, TAKE 1 TABLET (10 MG) BY MOUTH EVERY DAY, Disp: 30 tablet, Rfl: 10    gabapentin (NEURONTIN) 300 MG capsule, Take 1 capsule (300 mg total) by mouth 3 (three) times daily., Disp: 90 capsule, Rfl: 2    HYDROcodone-acetaminophen (NORCO) 5-325 mg per tablet, Take 1 tablet by mouth every 6 (six) hours as needed for Pain., Disp: 8 tablet, Rfl: 0    ibuprofen (ADVIL,MOTRIN) 800 MG tablet, Take 800 mg by mouth 3 (three) times daily., Disp: , Rfl:     lidocaine-prilocaine (EMLA) cream, Apply topically to affected area 45 minutes before port access, Disp: 30 g, Rfl: 1    losartan-hydrochlorothiazide 100-25 mg (HYZAAR) 100-25 mg per tablet, TAKE 1 TABLET BY MOUTH EVERY DAY, Disp: 90 tablet, Rfl: 0    ondansetron (ZOFRAN-ODT) 8 MG TbDL, Take 1 tablet (8 mg total) by mouth every 12 (twelve) hours as needed., Disp: 20 tablet, Rfl: 1    tadalafil (CIALIS) 5 MG tablet, Take 2 tablets (10 mg total) by mouth daily as needed for Erectile Dysfunction., Disp: 20 tablet, Rfl: 1    temazepam (RESTORIL) 15 mg Cap, Take 1 capsule (15 mg total) by mouth nightly as needed., Disp: 30 capsule, Rfl: 1    colchicine 0.6 mg tablet, Take 1 tablet (0.6 mg total) by mouth 2 (two) times daily as needed., Disp: 9 tablet, Rfl:  "11  No current facility-administered medications for this visit.   Allergies  Review of patient's allergies indicates:  No Known Allergies  Review of Systems  Review of Systems   Constitutional: Positive for fatigue. Negative for activity change, appetite change, chills, fever and unexpected weight change.   HENT: Negative for congestion, hearing loss, nosebleeds, sinus pressure and trouble swallowing.    Eyes: Negative for pain, discharge, itching and visual disturbance.   Respiratory: Negative for cough, chest tightness and shortness of breath.    Cardiovascular: Negative for chest pain, palpitations and leg swelling.   Gastrointestinal: Negative for abdominal distention, abdominal pain, blood in stool, constipation, diarrhea, nausea, rectal pain and vomiting.   Endocrine: Negative for heat intolerance and polydipsia.   Genitourinary: Negative for difficulty urinating, dysuria, flank pain, frequency, hematuria and urgency.   Musculoskeletal: Negative for arthralgias, back pain and neck pain.   Skin: Negative for color change, pallor and rash.   Neurological: Positive for numbness. Negative for dizziness and headaches.   Hematological: Negative for adenopathy. Does not bruise/bleed easily.   Psychiatric/Behavioral: Negative for confusion, decreased concentration and sleep disturbance. The patient is not nervous/anxious.         Objective     Objective:     Vitals:    10/01/19 1339   BP: (!) 142/82   BP Location: Left arm   Patient Position: Sitting   BP Method: Large (Automatic)   Pulse: 74   Resp: 18   Temp: 98.5 °F (36.9 °C)   TempSrc: Oral   SpO2: 97%   Weight: (!) 189.3 kg (417 lb 5.3 oz)   Height: 6' 1.5" (1.867 m)   Body mass index is 54.31 kg/m².    Body surface area is 3.13 meters squared.  Physical Exam   Constitutional: He is oriented to person, place, and time. He appears well-developed and well-nourished. No distress.   HENT:   Head: Normocephalic and atraumatic.   Mouth/Throat: Oropharynx is clear and " moist and mucous membranes are normal. No oral lesions.   Eyes: Conjunctivae and EOM are normal. Right eye exhibits no discharge. Left eye exhibits no discharge. No scleral icterus.   Cardiovascular: Normal rate, regular rhythm, S1 normal, S2 normal and normal heart sounds.   Pulmonary/Chest: Effort normal and breath sounds normal. No respiratory distress. He has no wheezes. He has no rhonchi. He has no rales.   Right breast with nipple retraction and skin thickening greatly improved; no palpable mass - stable  Left mediport accessed   Abdominal: Soft. Normal appearance and bowel sounds are normal. There is no tenderness.   Musculoskeletal: Normal range of motion. He exhibits no edema or deformity.   Neurological: He is alert and oriented to person, place, and time. He has normal strength.   Skin: Skin is warm, dry and intact. No pallor.   Darkening on palms   Psychiatric: His behavior is normal. Thought content normal.     Initial Breast exam (pre-chemo): Right breast with nipple retraction and skin thickening around nipple without well defined mass behind the skin thickening.     Labs and Imaging  Results for orders placed or performed in visit on 10/01/19   CBC Oncology   Result Value Ref Range    WBC 5.28 3.90 - 12.70 K/uL    RBC 4.08 (L) 4.60 - 6.20 M/uL    Hemoglobin 10.9 (L) 14.0 - 18.0 g/dL    Hematocrit 35.3 (L) 40.0 - 54.0 %    Mean Corpuscular Volume 87 82 - 98 fL    Mean Corpuscular Hemoglobin 26.7 (L) 27.0 - 31.0 pg    Mean Corpuscular Hemoglobin Conc 30.9 (L) 32.0 - 36.0 g/dL    RDW 14.4 11.5 - 14.5 %    Platelets 212 150 - 350 K/uL    MPV 11.2 9.2 - 12.9 fL    Gran # (ANC) 3.1 1.8 - 7.7 K/uL    Immature Grans (Abs) 0.05 (H) 0.00 - 0.04 K/uL       Assessment     Assessment:   1. Stage IB ([id number]) invasive ductal carcinoma with lobular features of right breast, retroareolar, ER neg, MN neg, Her2 neg, Grade 2, Ki67 80%, diagnosed 5/2019   * Genetics negative   * 5/27/19 initiated neoadjuvant ddAC  followed by weekly Taxol.    * US after ddAC showing improving disease; post anthracycline echo ordered   * Cycle 11 neoadjuvant weekly Taxol. Tumor has shrunk nicely. Dose reduced Taxol due to neuropathy.     2. Microcytic anemia   * Has anemia as baseline but worsened with chemotherapy   * Monitoring. Stable.     3. Adjustment disorder/depression.    * Dr Nunez; Improving.      4. Drug induced constipation   * Continue senna S.     5. HTN per PCP    6. Insomnia   * Tried melatonin without success   * Restoril 15 mg nightly for insomnia. Gabapentin which he's getting for PN may help too   * Improving.     7. Chemo neuropathy   * Gabapentin 300 mg tid. Decreased dose of taxol, cycle 3 forward. Monitor. Stable.     8. Erectile dysfunction   * Cialis    Plan:   Cycle 11 neoadjuvant Taxol, continue weekly with dose reduction.   Continue Restoril for insomnia  Continue gabapentin 300 mg tid.   Will let Dr Whyte know that he ends neoadjuvant next week.       Future Appointments   Date Time Provider Department Center   10/1/2019  3:00 PM [facility]   10/8/2019 11:45 AM [facility]JE[facility]   10/8/2019  1:30 PM Richa Bahena MD [facility] HEM ONC [facility]   10/8/2019  2:30 PM [facility]   10/15/2019  1:30 PM INJECTION, [facility]   10/15/2019  2:30 PM [facility]

## 2019-10-01 NOTE — NURSING
Patient here for blood draw from left chest port-accesses easily with good blood return with patient in standing position-blood to lab-line flushed and left accessed for chemo today.

## 2019-10-01 NOTE — PLAN OF CARE
1440 pt here for Taxol infusion D1C11, labs, hx, meds, allergies reviewed, pt with no complaints at this time, reclined in chair, continue to monitor

## 2019-10-02 ENCOUNTER — DOCUMENTATION ONLY (OUTPATIENT)
Dept: HEMATOLOGY/ONCOLOGY | Facility: CLINIC | Age: 44
End: 2019-10-02

## 2019-10-02 ENCOUNTER — TELEPHONE (OUTPATIENT)
Dept: SURGERY | Facility: CLINIC | Age: 44
End: 2019-10-02

## 2019-10-02 NOTE — TELEPHONE ENCOUNTER
----- Message from Yuri Whyte MD sent at 10/1/2019  2:08 PM CDT -----  Regarding: RE: Completing neoadjuvant chemo  Ok thanks.    I don't think we really need any.  He will do mastectomy.    [facility]    yuri      ----- Message -----  From: Richa Bahena MD  Sent: 10/1/2019   1:58 PM CDT  To: Yuri Whyte MD, Tess Heredia RN  Subject: Completing neoadjuvant chemo                     Just want to let you know that Paul will complete neoadjuvant chemo next week. He's done well. Not sure what kind of imaging you want on him before you see him.     - Richa

## 2019-10-02 NOTE — TELEPHONE ENCOUNTER
Spoke with patient regarding F/U completion of chemo appt, patient req appt on [place], appt scheduled and confirmed

## 2019-10-02 NOTE — PROGRESS NOTES
Request received for Boost. Ordered 1 case of vanilla and 1 case strawberry. $4.56 will be charged to patient assistance. Notified patient that it was ordered and that I would reach back out when it was available for .

## 2019-10-04 ENCOUNTER — DOCUMENTATION ONLY (OUTPATIENT)
Dept: HEMATOLOGY/ONCOLOGY | Facility: CLINIC | Age: 44
End: 2019-10-04

## 2019-10-04 NOTE — PROGRESS NOTES
"Received call from patient this afternoon. He said that the staff with [employer] is requesting verification of the assistance funds that he is using to cover the cost of his monthly housing expenses since he's not working and has no income currently. He's tried contacting Heidi with [facility] and left voice mail messages for her. He asked if a letter can be written and his wife will pick it up for him. Prepared a letter addressed to "To whom it may concern at [employer]", and included that patient was given financial assistance for his monthly housing expenses through the [facility] Patient Assistance Fund for $1,000.00 and referred to [facility] for the [facility] Fund. Called patient and informed him that the letter is ready. Placed the signed letter and the [facility] information sheet in an envelope with patient's name on the outside and left it with the reception desk staff in the [facility] on the 3rd Floor of the [facility] building. Informed patient that reception staff will be there until the last scheduled patient checks in for 4:30 appt time. Patient was going to call his wife and let her know. Will continue to follow and assist as needs are identified.   "

## 2019-10-08 ENCOUNTER — INFUSION (OUTPATIENT)
Dept: INFUSION THERAPY | Facility: HOSPITAL | Age: 44
End: 2019-10-08
Attending: INTERNAL MEDICINE
Payer: MEDICAID

## 2019-10-08 ENCOUNTER — TELEPHONE (OUTPATIENT)
Dept: HEMATOLOGY/ONCOLOGY | Facility: CLINIC | Age: 44
End: 2019-10-08

## 2019-10-08 ENCOUNTER — OFFICE VISIT (OUTPATIENT)
Dept: HEMATOLOGY/ONCOLOGY | Facility: CLINIC | Age: 44
End: 2019-10-08
Payer: MEDICAID

## 2019-10-08 VITALS
HEIGHT: 74 IN | DIASTOLIC BLOOD PRESSURE: 85 MMHG | SYSTOLIC BLOOD PRESSURE: 144 MMHG | RESPIRATION RATE: 18 BRPM | TEMPERATURE: 98 F | OXYGEN SATURATION: 96 % | WEIGHT: 315 LBS | BODY MASS INDEX: 40.43 KG/M2 | HEART RATE: 78 BPM

## 2019-10-08 VITALS
TEMPERATURE: 99 F | HEART RATE: 74 BPM | DIASTOLIC BLOOD PRESSURE: 77 MMHG | SYSTOLIC BLOOD PRESSURE: 147 MMHG | RESPIRATION RATE: 18 BRPM

## 2019-10-08 DIAGNOSIS — C50.021 MALIGNANT NEOPLASM INVOLVING BOTH NIPPLE AND AREOLA OF RIGHT BREAST IN MALE, ESTROGEN RECEPTOR NEGATIVE: Primary | ICD-10-CM

## 2019-10-08 DIAGNOSIS — Z17.1 MALIGNANT NEOPLASM INVOLVING BOTH NIPPLE AND AREOLA OF RIGHT BREAST IN MALE, ESTROGEN RECEPTOR NEGATIVE: Primary | ICD-10-CM

## 2019-10-08 DIAGNOSIS — R73.9 HYPERGLYCEMIA: ICD-10-CM

## 2019-10-08 DIAGNOSIS — J01.00 ACUTE NON-RECURRENT MAXILLARY SINUSITIS: ICD-10-CM

## 2019-10-08 DIAGNOSIS — I10 ESSENTIAL HYPERTENSION: ICD-10-CM

## 2019-10-08 DIAGNOSIS — N52.8 OTHER MALE ERECTILE DYSFUNCTION: ICD-10-CM

## 2019-10-08 DIAGNOSIS — D50.9 MICROCYTIC ANEMIA: ICD-10-CM

## 2019-10-08 LAB
ALBUMIN SERPL BCP-MCNC: 4.3 G/DL (ref 3.5–5.2)
ALP SERPL-CCNC: 81 U/L (ref 55–135)
ALT SERPL W/O P-5'-P-CCNC: 43 U/L (ref 10–44)
ANION GAP SERPL CALC-SCNC: 9 MMOL/L (ref 8–16)
AST SERPL-CCNC: 21 U/L (ref 10–40)
BILIRUB SERPL-MCNC: 0.7 MG/DL (ref 0.1–1)
BUN SERPL-MCNC: 14 MG/DL (ref 6–20)
CALCIUM SERPL-MCNC: 10 MG/DL (ref 8.7–10.5)
CHLORIDE SERPL-SCNC: 98 MMOL/L (ref 95–110)
CO2 SERPL-SCNC: 24 MMOL/L (ref 23–29)
CREAT SERPL-MCNC: 1.4 MG/DL (ref 0.5–1.4)
ERYTHROCYTE [DISTWIDTH] IN BLOOD BY AUTOMATED COUNT: 14 % (ref 11.5–14.5)
EST. GFR  (AFRICAN AMERICAN): >60 ML/MIN/1.73 M^2
EST. GFR  (NON AFRICAN AMERICAN): >60 ML/MIN/1.73 M^2
GLUCOSE SERPL-MCNC: 450 MG/DL (ref 70–110)
HCT VFR BLD AUTO: 34.6 % (ref 40–54)
HGB BLD-MCNC: 11.6 G/DL (ref 14–18)
IMM GRANULOCYTES # BLD AUTO: 0.03 K/UL (ref 0–0.04)
MCH RBC QN AUTO: 27.3 PG (ref 27–31)
MCHC RBC AUTO-ENTMCNC: 33.5 G/DL (ref 32–36)
MCV RBC AUTO: 81 FL (ref 82–98)
NEUTROPHILS # BLD AUTO: 3 K/UL (ref 1.8–7.7)
PLATELET # BLD AUTO: 231 K/UL (ref 150–350)
PMV BLD AUTO: 11.8 FL (ref 9.2–12.9)
POTASSIUM SERPL-SCNC: 4.1 MMOL/L (ref 3.5–5.1)
PROT SERPL-MCNC: 7.9 G/DL (ref 6–8.4)
RBC # BLD AUTO: 4.25 M/UL (ref 4.6–6.2)
SODIUM SERPL-SCNC: 131 MMOL/L (ref 136–145)
WBC # BLD AUTO: 4.93 K/UL (ref 3.9–12.7)

## 2019-10-08 PROCEDURE — 96367 TX/PROPH/DG ADDL SEQ IV INF: CPT

## 2019-10-08 PROCEDURE — 80053 COMPREHEN METABOLIC PANEL: CPT

## 2019-10-08 PROCEDURE — 96375 TX/PRO/DX INJ NEW DRUG ADDON: CPT

## 2019-10-08 PROCEDURE — 99999 PR PBB SHADOW E&M-EST. PATIENT-LVL III: CPT | Mod: PBBFAC,,, | Performed by: INTERNAL MEDICINE

## 2019-10-08 PROCEDURE — 63600175 PHARM REV CODE 636 W HCPCS: Performed by: INTERNAL MEDICINE

## 2019-10-08 PROCEDURE — A4216 STERILE WATER/SALINE, 10 ML: HCPCS | Performed by: INTERNAL MEDICINE

## 2019-10-08 PROCEDURE — 99215 OFFICE O/P EST HI 40 MIN: CPT | Mod: S$PBB,,, | Performed by: INTERNAL MEDICINE

## 2019-10-08 PROCEDURE — 85027 COMPLETE CBC AUTOMATED: CPT

## 2019-10-08 PROCEDURE — 63600175 PHARM REV CODE 636 W HCPCS: Mod: JG | Performed by: INTERNAL MEDICINE

## 2019-10-08 PROCEDURE — S0028 INJECTION, FAMOTIDINE, 20 MG: HCPCS | Performed by: INTERNAL MEDICINE

## 2019-10-08 PROCEDURE — 25000003 PHARM REV CODE 250: Performed by: INTERNAL MEDICINE

## 2019-10-08 PROCEDURE — 99215 PR OFFICE/OUTPT VISIT, EST, LEVL V, 40-54 MIN: ICD-10-PCS | Mod: S$PBB,,, | Performed by: INTERNAL MEDICINE

## 2019-10-08 PROCEDURE — 96413 CHEMO IV INFUSION 1 HR: CPT

## 2019-10-08 PROCEDURE — 36593 DECLOT VASCULAR DEVICE: CPT

## 2019-10-08 PROCEDURE — 99999 PR PBB SHADOW E&M-EST. PATIENT-LVL III: ICD-10-PCS | Mod: PBBFAC,,, | Performed by: INTERNAL MEDICINE

## 2019-10-08 PROCEDURE — 96372 THER/PROPH/DIAG INJ SC/IM: CPT

## 2019-10-08 PROCEDURE — 99213 OFFICE O/P EST LOW 20 MIN: CPT | Mod: PBBFAC,25 | Performed by: INTERNAL MEDICINE

## 2019-10-08 RX ORDER — SODIUM CHLORIDE 0.9 % (FLUSH) 0.9 %
10 SYRINGE (ML) INJECTION
Status: DISCONTINUED | OUTPATIENT
Start: 2019-10-08 | End: 2019-10-08 | Stop reason: HOSPADM

## 2019-10-08 RX ORDER — SODIUM CHLORIDE 0.9 % (FLUSH) 0.9 %
10 SYRINGE (ML) INJECTION
Status: CANCELLED | OUTPATIENT
Start: 2019-10-08

## 2019-10-08 RX ORDER — HEPARIN 100 UNIT/ML
500 SYRINGE INTRAVENOUS
Status: CANCELLED | OUTPATIENT
Start: 2019-10-08

## 2019-10-08 RX ORDER — HEPARIN 100 UNIT/ML
500 SYRINGE INTRAVENOUS
Status: COMPLETED | OUTPATIENT
Start: 2019-10-08 | End: 2019-10-08

## 2019-10-08 RX ORDER — AZITHROMYCIN 250 MG/1
TABLET, FILM COATED ORAL
Qty: 6 TABLET | Refills: 0 | Status: SHIPPED | OUTPATIENT
Start: 2019-10-08 | End: 2019-10-08

## 2019-10-08 RX ORDER — DIPHENHYDRAMINE HYDROCHLORIDE 50 MG/ML
50 INJECTION INTRAMUSCULAR; INTRAVENOUS ONCE AS NEEDED
Status: CANCELLED | OUTPATIENT
Start: 2019-10-08

## 2019-10-08 RX ORDER — EPINEPHRINE 0.3 MG/.3ML
0.3 INJECTION SUBCUTANEOUS ONCE AS NEEDED
Status: DISCONTINUED | OUTPATIENT
Start: 2019-10-08 | End: 2019-10-08 | Stop reason: HOSPADM

## 2019-10-08 RX ORDER — HEPARIN 100 UNIT/ML
500 SYRINGE INTRAVENOUS
Status: DISCONTINUED | OUTPATIENT
Start: 2019-10-08 | End: 2019-10-08 | Stop reason: HOSPADM

## 2019-10-08 RX ORDER — TADALAFIL 5 MG/1
10 TABLET ORAL DAILY PRN
Qty: 20 TABLET | Refills: 1 | Status: SHIPPED | OUTPATIENT
Start: 2019-10-08 | End: 2019-10-08

## 2019-10-08 RX ORDER — FAMOTIDINE 10 MG/ML
20 INJECTION INTRAVENOUS
Status: CANCELLED | OUTPATIENT
Start: 2019-10-08

## 2019-10-08 RX ORDER — AZITHROMYCIN 250 MG/1
TABLET, FILM COATED ORAL
Qty: 6 TABLET | Refills: 0 | Status: ON HOLD | OUTPATIENT
Start: 2019-10-08 | End: 2019-10-13 | Stop reason: HOSPADM

## 2019-10-08 RX ORDER — DIPHENHYDRAMINE HYDROCHLORIDE 50 MG/ML
50 INJECTION INTRAMUSCULAR; INTRAVENOUS ONCE AS NEEDED
Status: DISCONTINUED | OUTPATIENT
Start: 2019-10-08 | End: 2019-10-08 | Stop reason: HOSPADM

## 2019-10-08 RX ORDER — SODIUM CHLORIDE 0.9 % (FLUSH) 0.9 %
10 SYRINGE (ML) INJECTION
Status: COMPLETED | OUTPATIENT
Start: 2019-10-08 | End: 2019-10-08

## 2019-10-08 RX ORDER — TADALAFIL 5 MG/1
10 TABLET ORAL DAILY PRN
Qty: 20 TABLET | Refills: 1 | Status: SHIPPED | OUTPATIENT
Start: 2019-10-08 | End: 2020-06-27 | Stop reason: SDUPTHER

## 2019-10-08 RX ORDER — EPINEPHRINE 0.3 MG/.3ML
0.3 INJECTION SUBCUTANEOUS ONCE AS NEEDED
Status: CANCELLED | OUTPATIENT
Start: 2019-10-08

## 2019-10-08 RX ORDER — FAMOTIDINE 10 MG/ML
20 INJECTION INTRAVENOUS
Status: COMPLETED | OUTPATIENT
Start: 2019-10-08 | End: 2019-10-08

## 2019-10-08 RX ADMIN — FAMOTIDINE 20 MG: 10 INJECTION INTRAVENOUS at 02:10

## 2019-10-08 RX ADMIN — PACLITAXEL 156 MG: 6 INJECTION, SOLUTION INTRAVENOUS at 04:10

## 2019-10-08 RX ADMIN — Medication 10 ML: at 12:10

## 2019-10-08 RX ADMIN — ALTEPLASE 2 MG: 2.2 INJECTION, POWDER, LYOPHILIZED, FOR SOLUTION INTRAVENOUS at 12:10

## 2019-10-08 RX ADMIN — HEPARIN SODIUM (PORCINE) LOCK FLUSH IV SOLN 100 UNIT/ML 500 UNITS: 100 SOLUTION at 12:10

## 2019-10-08 RX ADMIN — DIPHENHYDRAMINE HYDROCHLORIDE 25 MG: 50 INJECTION INTRAMUSCULAR; INTRAVENOUS at 02:10

## 2019-10-08 RX ADMIN — INSULIN HUMAN 5 UNITS: 100 INJECTION, SOLUTION PARENTERAL at 03:10

## 2019-10-08 RX ADMIN — SODIUM CHLORIDE: 0.9 INJECTION, SOLUTION INTRAVENOUS at 02:10

## 2019-10-08 RX ADMIN — DEXAMETHASONE SODIUM PHOSPHATE 4 MG: 4 INJECTION, SOLUTION INTRAMUSCULAR; INTRAVENOUS at 03:10

## 2019-10-08 RX ADMIN — HEPARIN SODIUM (PORCINE) LOCK FLUSH IV SOLN 100 UNIT/ML 500 UNITS: 100 SOLUTION at 05:10

## 2019-10-08 NOTE — NURSING
Patient here for blood draw from left chest port-accesses easily without blood return after much flushing,deep breathing, and changing positions-Activase 2mg IVP into port @12;45-blood drawn peripherally from right hand and sent to lab-tolerated well.         Head is atraumatic. Head shape is symmetrical.

## 2019-10-08 NOTE — TELEPHONE ENCOUNTER
S/w Tabitha, chemo nurse, who will check POCT before chemo and report back to Dr. Bahena and staff.        ----- Message from Richa Bahena MD sent at 10/8/2019  1:56 PM CDT -----  MD 3 weeks, cbc.   Can 5th floor do a POC glucose? If not, will they send stat BMP? His glucose was 450 on CMP today.

## 2019-10-08 NOTE — PLAN OF CARE
Tolerated taxol infusion well vitals stable PAC flushed hep locked de accessed site covered with band aid, family present for bell ringing ceremony leaves clinic ambulatory in good spirits no s/s of hyper/hypoglycemia noted s/s reviewed with pt. Instructed to contact MD office with questions, concerns.

## 2019-10-08 NOTE — PROGRESS NOTES
History:     Reason For Consultation:   1. Stage IB ([id number]) triple negative invasive ductal carcinoma with lobular features of right breast, retroareolar, Grade 2, Ki67 80%, diagnosed 5/2019    Records Obtained: Records of the patients history including those obtained from the referring provider were reviewed and summarized in detail.    HPI:   Paul Li Jr. presents for follow up of breast cancer and cycle 12 weekly Taxol.   Neuropathy - stable - in feet  Fatigue - stable  Increased urination recently.   C/o sinus pressure/pain and increased sputum production. No fevers/cough.     History: I reviewed, confirmed and updated history (past medical, social, family) as necessary.      Medications    Current Outpatient Medications:     amLODIPine (NORVASC) 10 MG tablet, TAKE 1 TABLET (10 MG) BY MOUTH EVERY DAY, Disp: 30 tablet, Rfl: 10    gabapentin (NEURONTIN) 300 MG capsule, Take 1 capsule (300 mg total) by mouth 3 (three) times daily., Disp: 90 capsule, Rfl: 2    HYDROcodone-acetaminophen (NORCO) 5-325 mg per tablet, Take 1 tablet by mouth every 6 (six) hours as needed for Pain., Disp: 8 tablet, Rfl: 0    ibuprofen (ADVIL,MOTRIN) 800 MG tablet, Take 800 mg by mouth 3 (three) times daily., Disp: , Rfl:     lidocaine-prilocaine (EMLA) cream, Apply topically to affected area 45 minutes before port access, Disp: 30 g, Rfl: 1    losartan-hydrochlorothiazide 100-25 mg (HYZAAR) 100-25 mg per tablet, TAKE 1 TABLET BY MOUTH EVERY DAY, Disp: 90 tablet, Rfl: 0    ondansetron (ZOFRAN-ODT) 8 MG TbDL, Take 1 tablet (8 mg total) by mouth every 12 (twelve) hours as needed., Disp: 20 tablet, Rfl: 1    tadalafil (CIALIS) 5 MG tablet, Take 2 tablets (10 mg total) by mouth daily as needed for Erectile Dysfunction., Disp: 20 tablet, Rfl: 1    temazepam (RESTORIL) 15 mg Cap, Take 1 capsule (15 mg total) by mouth nightly as needed., Disp: 30 capsule, Rfl: 1    azithromycin (ZITHROMAX Z-DAWOOD) 250 MG tablet, Take 2 tablets  "(500 mg) on  Day 1,  followed by 1 tablet (250 mg) once daily on Days 2 through 5., Disp: 6 tablet, Rfl: 0    colchicine 0.6 mg tablet, Take 1 tablet (0.6 mg total) by mouth 2 (two) times daily as needed., Disp: 9 tablet, Rfl: 11  No current facility-administered medications for this visit.   Allergies  Review of patient's allergies indicates:  No Known Allergies  Review of Systems  Review of Systems   Constitutional: Positive for fatigue. Negative for activity change, appetite change, chills, fever and unexpected weight change.   HENT: Positive for sinus pressure and sinus pain. Negative for congestion, hearing loss, nosebleeds and trouble swallowing.    Eyes: Negative for pain, discharge, itching and visual disturbance.   Respiratory: Negative for cough, chest tightness and shortness of breath.    Cardiovascular: Negative for chest pain, palpitations and leg swelling.   Gastrointestinal: Negative for abdominal distention, abdominal pain, blood in stool, constipation, diarrhea, nausea, rectal pain and vomiting.   Endocrine: Negative for heat intolerance and polydipsia.   Genitourinary: Positive for frequency. Negative for difficulty urinating, dysuria, flank pain, hematuria and urgency.   Musculoskeletal: Negative for arthralgias, back pain and neck pain.   Skin: Positive for color change (nails). Negative for pallor and rash.   Neurological: Positive for numbness. Negative for dizziness and headaches.   Hematological: Negative for adenopathy. Does not bruise/bleed easily.   Psychiatric/Behavioral: Negative for confusion, decreased concentration and sleep disturbance. The patient is not nervous/anxious.         Objective     Objective:     Vitals:    10/08/19 1308   BP: (!) 144/85   BP Location: Left arm   Patient Position: Sitting   BP Method: Large (Automatic)   Pulse: 78   Resp: 18   Temp: 98.1 °F (36.7 °C)   TempSrc: Oral   SpO2: 96%   Weight: (!) 184.6 kg (406 lb 15.5 oz)   Height: 6' 1.5" (1.867 m)   Body " mass index is 52.97 kg/m².    Body surface area is 3.09 meters squared.  Physical Exam   Constitutional: He is oriented to person, place, and time. He appears well-developed and well-nourished. No distress.   HENT:   Head: Normocephalic and atraumatic.   Mouth/Throat: Oropharynx is clear and moist and mucous membranes are normal. No oral lesions.   Eyes: Conjunctivae and EOM are normal. Right eye exhibits no discharge. Left eye exhibits no discharge. No scleral icterus.   Cardiovascular: Normal rate, regular rhythm, S1 normal, S2 normal and normal heart sounds.   Pulmonary/Chest: Effort normal and breath sounds normal. No respiratory distress. He has no wheezes. He has no rhonchi. He has no rales.   Right breast with nipple retraction and skin thickening greatly improved; no palpable mass - stable.   Left mediport accessed   Abdominal: Soft. Normal appearance and bowel sounds are normal. There is no tenderness.   Musculoskeletal: Normal range of motion. He exhibits no edema or deformity.   Neurological: He is alert and oriented to person, place, and time. He has normal strength.   Skin: Skin is warm, dry and intact. No pallor.   Darkening and thinning of nails   Psychiatric: His behavior is normal. Thought content normal.     Initial Breast exam (pre-chemo): Right breast with nipple retraction and skin thickening around nipple without well defined mass behind the skin thickening.     Labs and Imaging  Results for orders placed or performed in visit on 10/08/19   CBC Oncology   Result Value Ref Range    WBC 4.93 3.90 - 12.70 K/uL    RBC 4.25 (L) 4.60 - 6.20 M/uL    Hemoglobin 11.6 (L) 14.0 - 18.0 g/dL    Hematocrit 34.6 (L) 40.0 - 54.0 %    Mean Corpuscular Volume 81 (L) 82 - 98 fL    Mean Corpuscular Hemoglobin 27.3 27.0 - 31.0 pg    Mean Corpuscular Hemoglobin Conc 33.5 32.0 - 36.0 g/dL    RDW 14.0 11.5 - 14.5 %    Platelets 231 150 - 350 K/uL    MPV 11.8 9.2 - 12.9 fL    Gran # (ANC) 3.0 1.8 - 7.7 K/uL    Immature  Grans (Abs) 0.03 0.00 - 0.04 K/uL       Assessment     Assessment:   1. Stage IB ([id number]) invasive ductal carcinoma with lobular features of right breast, retroareolar, ER neg, NC neg, Her2 neg, Grade 2, Ki67 80%, diagnosed 5/2019   * Genetics negative   * 5/27/19 initiated neoadjuvant ddAC followed by weekly Taxol.    * US after ddAC showing improving disease   * Cycle 12 neoadjuvant weekly Taxol. Tumor has shrunk nicely. Dose reduced Taxol due to neuropathy.     - Seeing Dr Whyte next week.     2. Microcytic anemia   * Has anemia as baseline but worsened with chemotherapy   * Monitoring. Improving.     3. Adjustment disorder/depression.    * Dr Nunez; Improving.      4. Drug induced constipation   * Continue senna S.     5. HTN per PCP    6. Insomnia   * Tried melatonin without success   * Restoril 15 mg nightly for insomnia. Gabapentin which he's getting for PN may help too   * Improving.     7. Chemo neuropathy   * Gabapentin 300 mg tid. Decreased dose of taxol, cycle 3 forward. Monitor. Stable.     8. Erectile dysfunction   * Cialis - refilled.     9. Hyperglycemia   * Repeat glucose level - insulin if needed   * Decrease steroid dose today.     10. Acute sinusitis. Symptoms persisted now for about two weeks. Abx to pharmacy.   Plan:   Cycle 12 neoadjuvant Taxol, continue weekly with dose reduction.   Continue Restoril for insomnia  Continue gabapentin 300 mg tid.   Will plan to leave port in case he has residual disease on pathology.   Follow up 3 weeks with cbc (patient request) and then 2-3 weeks post-op to review pathology.   Repeat glucose level today; decrease steroids     Future Appointments   Date Time Provider Department Center   10/8/2019  1:30 PM Richa Bahena MD [facility] HEM ONC [facility]   10/8/2019  2:30 PM [facility]   10/15/2019  1:30 PM INJECTION, [facility]ION [facility]   10/15/2019  2:30 PM [facility]   10/17/2019  2:45  [facility]

## 2019-10-08 NOTE — Clinical Note
MD 3 weeks, cbc. Can 5th floor do a POC glucose? If not, will they send stat BMP? His glucose was 450 on CMP today.

## 2019-10-10 ENCOUNTER — HOSPITAL ENCOUNTER (INPATIENT)
Facility: HOSPITAL | Age: 44
LOS: 3 days | Discharge: HOME | DRG: 638 | End: 2019-10-13
Attending: EMERGENCY MEDICINE | Admitting: EMERGENCY MEDICINE
Payer: MEDICAID

## 2019-10-10 ENCOUNTER — PATIENT MESSAGE (OUTPATIENT)
Dept: FAMILY MEDICINE | Facility: CLINIC | Age: 44
End: 2019-10-10

## 2019-10-10 ENCOUNTER — PATIENT MESSAGE (OUTPATIENT)
Dept: HEMATOLOGY/ONCOLOGY | Facility: CLINIC | Age: 44
End: 2019-10-10

## 2019-10-10 DIAGNOSIS — Z17.1 MALIGNANT NEOPLASM INVOLVING BOTH NIPPLE AND AREOLA OF RIGHT BREAST IN MALE, ESTROGEN RECEPTOR NEGATIVE: ICD-10-CM

## 2019-10-10 DIAGNOSIS — R73.9 HYPERGLYCEMIA: Primary | ICD-10-CM

## 2019-10-10 DIAGNOSIS — R42 DIZZINESS: ICD-10-CM

## 2019-10-10 DIAGNOSIS — E66.01 CLASS 3 SEVERE OBESITY DUE TO EXCESS CALORIES WITH SERIOUS COMORBIDITY AND BODY MASS INDEX (BMI) OF 50.0 TO 59.9 IN ADULT: ICD-10-CM

## 2019-10-10 DIAGNOSIS — C50.021 MALIGNANT NEOPLASM INVOLVING BOTH NIPPLE AND AREOLA OF RIGHT BREAST IN MALE, ESTROGEN RECEPTOR NEGATIVE: ICD-10-CM

## 2019-10-10 DIAGNOSIS — E11.9 NEW ONSET TYPE 2 DIABETES MELLITUS: ICD-10-CM

## 2019-10-10 PROBLEM — E66.813 CLASS 3 SEVERE OBESITY DUE TO EXCESS CALORIES WITH SERIOUS COMORBIDITY AND BODY MASS INDEX (BMI) OF 50.0 TO 59.9 IN ADULT: Status: ACTIVE | Noted: 2019-10-10

## 2019-10-10 PROBLEM — N17.9 AKI (ACUTE KIDNEY INJURY): Status: ACTIVE | Noted: 2019-10-10

## 2019-10-10 PROBLEM — E09.9 STEROID-INDUCED DIABETES MELLITUS: Status: ACTIVE | Noted: 2019-10-10

## 2019-10-10 PROBLEM — T38.0X5A STEROID-INDUCED DIABETES MELLITUS: Status: ACTIVE | Noted: 2019-10-10

## 2019-10-10 LAB
ALBUMIN SERPL BCP-MCNC: 3.8 G/DL (ref 3.5–5.2)
ALBUMIN SERPL BCP-MCNC: 3.9 G/DL (ref 3.5–5.2)
ALBUMIN SERPL BCP-MCNC: 4.1 G/DL (ref 3.5–5.2)
ALBUMIN SERPL BCP-MCNC: 4.2 G/DL (ref 3.5–5.2)
ALBUMIN SERPL BCP-MCNC: 4.3 G/DL (ref 3.5–5.2)
ALLENS TEST: ABNORMAL
ALP SERPL-CCNC: 66 U/L (ref 55–135)
ALP SERPL-CCNC: 67 U/L (ref 55–135)
ALP SERPL-CCNC: 72 U/L (ref 55–135)
ALP SERPL-CCNC: 77 U/L (ref 55–135)
ALP SERPL-CCNC: 80 U/L (ref 55–135)
ALT SERPL W/O P-5'-P-CCNC: 38 U/L (ref 10–44)
ALT SERPL W/O P-5'-P-CCNC: 38 U/L (ref 10–44)
ALT SERPL W/O P-5'-P-CCNC: 39 U/L (ref 10–44)
ALT SERPL W/O P-5'-P-CCNC: 42 U/L (ref 10–44)
ALT SERPL W/O P-5'-P-CCNC: 43 U/L (ref 10–44)
ANION GAP SERPL CALC-SCNC: 10 MMOL/L (ref 8–16)
ANION GAP SERPL CALC-SCNC: 8 MMOL/L (ref 8–16)
ANION GAP SERPL CALC-SCNC: 9 MMOL/L (ref 8–16)
AST SERPL-CCNC: 18 U/L (ref 10–40)
AST SERPL-CCNC: 19 U/L (ref 10–40)
AST SERPL-CCNC: 23 U/L (ref 10–40)
B-OH-BUTYR BLD STRIP-SCNC: 0.6 MMOL/L (ref 0–0.5)
BACTERIA #/AREA URNS AUTO: NORMAL /HPF
BASOPHILS # BLD AUTO: 0.01 K/UL (ref 0–0.2)
BASOPHILS NFR BLD: 0.3 % (ref 0–1.9)
BILIRUB SERPL-MCNC: 0.4 MG/DL (ref 0.1–1)
BILIRUB SERPL-MCNC: 0.4 MG/DL (ref 0.1–1)
BILIRUB SERPL-MCNC: 0.5 MG/DL (ref 0.1–1)
BILIRUB SERPL-MCNC: 0.5 MG/DL (ref 0.1–1)
BILIRUB SERPL-MCNC: 0.9 MG/DL (ref 0.1–1)
BILIRUB UR QL STRIP: NEGATIVE
BUN SERPL-MCNC: 14 MG/DL (ref 6–20)
BUN SERPL-MCNC: 15 MG/DL (ref 6–20)
BUN SERPL-MCNC: 15 MG/DL (ref 6–20)
BUN SERPL-MCNC: 17 MG/DL (ref 6–20)
BUN SERPL-MCNC: 22 MG/DL (ref 6–20)
CALCIUM SERPL-MCNC: 8.8 MG/DL (ref 8.7–10.5)
CALCIUM SERPL-MCNC: 8.9 MG/DL (ref 8.7–10.5)
CALCIUM SERPL-MCNC: 9.2 MG/DL (ref 8.7–10.5)
CALCIUM SERPL-MCNC: 9.3 MG/DL (ref 8.7–10.5)
CALCIUM SERPL-MCNC: 9.6 MG/DL (ref 8.7–10.5)
CHLORIDE SERPL-SCNC: 104 MMOL/L (ref 95–110)
CHLORIDE SERPL-SCNC: 105 MMOL/L (ref 95–110)
CHLORIDE SERPL-SCNC: 106 MMOL/L (ref 95–110)
CHLORIDE SERPL-SCNC: 106 MMOL/L (ref 95–110)
CHLORIDE SERPL-SCNC: 93 MMOL/L (ref 95–110)
CLARITY UR REFRACT.AUTO: CLEAR
CO2 SERPL-SCNC: 21 MMOL/L (ref 23–29)
CO2 SERPL-SCNC: 22 MMOL/L (ref 23–29)
CO2 SERPL-SCNC: 22 MMOL/L (ref 23–29)
CO2 SERPL-SCNC: 23 MMOL/L (ref 23–29)
CO2 SERPL-SCNC: 24 MMOL/L (ref 23–29)
COLOR UR AUTO: ABNORMAL
CREAT SERPL-MCNC: 1.3 MG/DL (ref 0.5–1.4)
CREAT SERPL-MCNC: 1.5 MG/DL (ref 0.5–1.4)
CREAT SERPL-MCNC: 2 MG/DL (ref 0.5–1.4)
DIFFERENTIAL METHOD: ABNORMAL
EOSINOPHIL # BLD AUTO: 0 K/UL (ref 0–0.5)
EOSINOPHIL NFR BLD: 0 % (ref 0–8)
ERYTHROCYTE [DISTWIDTH] IN BLOOD BY AUTOMATED COUNT: 14 % (ref 11.5–14.5)
EST. GFR  (AFRICAN AMERICAN): 46.5 ML/MIN/1.73 M^2
EST. GFR  (AFRICAN AMERICAN): >60 ML/MIN/1.73 M^2
EST. GFR  (NON AFRICAN AMERICAN): 40.3 ML/MIN/1.73 M^2
EST. GFR  (NON AFRICAN AMERICAN): 57 ML/MIN/1.73 M^2
EST. GFR  (NON AFRICAN AMERICAN): >60 ML/MIN/1.73 M^2
ESTIMATED AVG GLUCOSE: 235 MG/DL (ref 68–131)
GLUCOSE SERPL-MCNC: 203 MG/DL (ref 70–110)
GLUCOSE SERPL-MCNC: 226 MG/DL (ref 70–110)
GLUCOSE SERPL-MCNC: 297 MG/DL (ref 70–110)
GLUCOSE SERPL-MCNC: 297 MG/DL (ref 70–110)
GLUCOSE SERPL-MCNC: 832 MG/DL (ref 70–110)
GLUCOSE UR QL STRIP: ABNORMAL
HBA1C MFR BLD HPLC: 9.8 % (ref 4–5.6)
HCO3 UR-SCNC: 23.2 MMOL/L (ref 24–28)
HCT VFR BLD AUTO: 32.3 % (ref 40–54)
HGB BLD-MCNC: 10.8 G/DL (ref 14–18)
HGB UR QL STRIP: NEGATIVE
IMM GRANULOCYTES # BLD AUTO: 0.02 K/UL (ref 0–0.04)
IMM GRANULOCYTES NFR BLD AUTO: 0.5 % (ref 0–0.5)
KETONES UR QL STRIP: NEGATIVE
LEUKOCYTE ESTERASE UR QL STRIP: NEGATIVE
LYMPHOCYTES # BLD AUTO: 0.9 K/UL (ref 1–4.8)
LYMPHOCYTES NFR BLD: 23.9 % (ref 18–48)
MAGNESIUM SERPL-MCNC: 2.3 MG/DL (ref 1.6–2.6)
MCH RBC QN AUTO: 27.2 PG (ref 27–31)
MCHC RBC AUTO-ENTMCNC: 33.4 G/DL (ref 32–36)
MCV RBC AUTO: 81 FL (ref 82–98)
MICROSCOPIC COMMENT: NORMAL
MONOCYTES # BLD AUTO: 0.3 K/UL (ref 0.3–1)
MONOCYTES NFR BLD: 7 % (ref 4–15)
NEUTROPHILS # BLD AUTO: 2.6 K/UL (ref 1.8–7.7)
NEUTROPHILS NFR BLD: 68.3 % (ref 38–73)
NITRITE UR QL STRIP: NEGATIVE
NRBC BLD-RTO: 0 /100 WBC
PCO2 BLDA: 39.8 MMHG (ref 35–45)
PH SMN: 7.37 [PH] (ref 7.35–7.45)
PH UR STRIP: 6 [PH] (ref 5–8)
PHOSPHATE SERPL-MCNC: 3.9 MG/DL (ref 2.7–4.5)
PLATELET # BLD AUTO: 204 K/UL (ref 150–350)
PMV BLD AUTO: 12.3 FL (ref 9.2–12.9)
PO2 BLDA: 80 MMHG (ref 40–60)
POC BE: -2 MMOL/L
POC SATURATED O2: 95 % (ref 95–100)
POC TCO2: 24 MMOL/L (ref 24–29)
POCT GLUCOSE: 192 MG/DL (ref 70–110)
POCT GLUCOSE: 207 MG/DL (ref 70–110)
POCT GLUCOSE: 223 MG/DL (ref 70–110)
POCT GLUCOSE: 227 MG/DL (ref 70–110)
POCT GLUCOSE: 233 MG/DL (ref 70–110)
POCT GLUCOSE: 234 MG/DL (ref 70–110)
POCT GLUCOSE: 277 MG/DL (ref 70–110)
POCT GLUCOSE: 289 MG/DL (ref 70–110)
POCT GLUCOSE: 306 MG/DL (ref 70–110)
POCT GLUCOSE: 388 MG/DL (ref 70–110)
POCT GLUCOSE: 432 MG/DL (ref 70–110)
POCT GLUCOSE: >500 MG/DL (ref 70–110)
POTASSIUM SERPL-SCNC: 3.8 MMOL/L (ref 3.5–5.1)
POTASSIUM SERPL-SCNC: 3.9 MMOL/L (ref 3.5–5.1)
POTASSIUM SERPL-SCNC: 4.8 MMOL/L (ref 3.5–5.1)
PROT SERPL-MCNC: 6.9 G/DL (ref 6–8.4)
PROT SERPL-MCNC: 7.1 G/DL (ref 6–8.4)
PROT SERPL-MCNC: 7.5 G/DL (ref 6–8.4)
PROT SERPL-MCNC: 7.7 G/DL (ref 6–8.4)
PROT SERPL-MCNC: 7.8 G/DL (ref 6–8.4)
PROT UR QL STRIP: NEGATIVE
RBC # BLD AUTO: 3.97 M/UL (ref 4.6–6.2)
RBC #/AREA URNS AUTO: 1 /HPF (ref 0–4)
SAMPLE: ABNORMAL
SITE: ABNORMAL
SODIUM SERPL-SCNC: 124 MMOL/L (ref 136–145)
SODIUM SERPL-SCNC: 135 MMOL/L (ref 136–145)
SODIUM SERPL-SCNC: 137 MMOL/L (ref 136–145)
SODIUM SERPL-SCNC: 138 MMOL/L (ref 136–145)
SODIUM SERPL-SCNC: 139 MMOL/L (ref 136–145)
SP GR UR STRIP: 1.02 (ref 1–1.03)
TROPONIN I SERPL DL<=0.01 NG/ML-MCNC: 0.01 NG/ML (ref 0–0.03)
URN SPEC COLLECT METH UR: ABNORMAL
WBC # BLD AUTO: 3.73 K/UL (ref 3.9–12.7)
WBC #/AREA URNS AUTO: 0 /HPF (ref 0–5)
YEAST UR QL AUTO: NORMAL

## 2019-10-10 PROCEDURE — 63600175 PHARM REV CODE 636 W HCPCS: Performed by: STUDENT IN AN ORGANIZED HEALTH CARE EDUCATION/TRAINING PROGRAM

## 2019-10-10 PROCEDURE — 96365 THER/PROPH/DIAG IV INF INIT: CPT

## 2019-10-10 PROCEDURE — 80053 COMPREHEN METABOLIC PANEL: CPT

## 2019-10-10 PROCEDURE — 99232 PR SUBSEQUENT HOSPITAL CARE,LEVL II: ICD-10-PCS | Mod: ,,, | Performed by: NURSE PRACTITIONER

## 2019-10-10 PROCEDURE — 84100 ASSAY OF PHOSPHORUS: CPT

## 2019-10-10 PROCEDURE — 93005 ELECTROCARDIOGRAM TRACING: CPT

## 2019-10-10 PROCEDURE — 81001 URINALYSIS AUTO W/SCOPE: CPT

## 2019-10-10 PROCEDURE — 99223 1ST HOSP IP/OBS HIGH 75: CPT | Mod: ,,, | Performed by: HOSPITALIST

## 2019-10-10 PROCEDURE — 20600001 HC STEP DOWN PRIVATE ROOM

## 2019-10-10 PROCEDURE — 63600175 PHARM REV CODE 636 W HCPCS: Performed by: EMERGENCY MEDICINE

## 2019-10-10 PROCEDURE — 83036 HEMOGLOBIN GLYCOSYLATED A1C: CPT

## 2019-10-10 PROCEDURE — 99284 PR EMERGENCY DEPT VISIT,LEVEL IV: ICD-10-PCS | Mod: ,,, | Performed by: EMERGENCY MEDICINE

## 2019-10-10 PROCEDURE — 99223 PR INITIAL HOSPITAL CARE,LEVL III: ICD-10-PCS | Mod: ,,, | Performed by: HOSPITALIST

## 2019-10-10 PROCEDURE — 93010 EKG 12-LEAD: ICD-10-PCS | Mod: ,,, | Performed by: INTERNAL MEDICINE

## 2019-10-10 PROCEDURE — 25000003 PHARM REV CODE 250: Performed by: STUDENT IN AN ORGANIZED HEALTH CARE EDUCATION/TRAINING PROGRAM

## 2019-10-10 PROCEDURE — 93010 ELECTROCARDIOGRAM REPORT: CPT | Mod: ,,, | Performed by: INTERNAL MEDICINE

## 2019-10-10 PROCEDURE — 84484 ASSAY OF TROPONIN QUANT: CPT

## 2019-10-10 PROCEDURE — 99285 EMERGENCY DEPT VISIT HI MDM: CPT | Mod: 25

## 2019-10-10 PROCEDURE — 80053 COMPREHEN METABOLIC PANEL: CPT | Mod: 91

## 2019-10-10 PROCEDURE — 63600175 PHARM REV CODE 636 W HCPCS: Performed by: NURSE PRACTITIONER

## 2019-10-10 PROCEDURE — 96361 HYDRATE IV INFUSION ADD-ON: CPT

## 2019-10-10 PROCEDURE — 99900035 HC TECH TIME PER 15 MIN (STAT)

## 2019-10-10 PROCEDURE — 82962 GLUCOSE BLOOD TEST: CPT

## 2019-10-10 PROCEDURE — 85025 COMPLETE CBC W/AUTO DIFF WBC: CPT

## 2019-10-10 PROCEDURE — 83735 ASSAY OF MAGNESIUM: CPT

## 2019-10-10 PROCEDURE — 96375 TX/PRO/DX INJ NEW DRUG ADDON: CPT

## 2019-10-10 PROCEDURE — 99232 SBSQ HOSP IP/OBS MODERATE 35: CPT | Mod: ,,, | Performed by: NURSE PRACTITIONER

## 2019-10-10 PROCEDURE — 99284 EMERGENCY DEPT VISIT MOD MDM: CPT | Mod: ,,, | Performed by: EMERGENCY MEDICINE

## 2019-10-10 PROCEDURE — 82803 BLOOD GASES ANY COMBINATION: CPT

## 2019-10-10 PROCEDURE — 82010 KETONE BODYS QUAN: CPT

## 2019-10-10 PROCEDURE — 36415 COLL VENOUS BLD VENIPUNCTURE: CPT

## 2019-10-10 RX ORDER — SODIUM CHLORIDE 9 MG/ML
1000 INJECTION, SOLUTION INTRAVENOUS
Status: ACTIVE | OUTPATIENT
Start: 2019-10-10 | End: 2019-10-10

## 2019-10-10 RX ORDER — SENNOSIDES 8.6 MG/1
8.6 TABLET ORAL DAILY
Status: DISCONTINUED | OUTPATIENT
Start: 2019-10-10 | End: 2019-10-13 | Stop reason: HOSPADM

## 2019-10-10 RX ORDER — LIDOCAINE AND PRILOCAINE 25; 25 MG/G; MG/G
CREAM TOPICAL
Status: DISCONTINUED | OUTPATIENT
Start: 2019-10-10 | End: 2019-10-13 | Stop reason: HOSPADM

## 2019-10-10 RX ORDER — HEPARIN SODIUM 5000 [USP'U]/ML
7500 INJECTION, SOLUTION INTRAVENOUS; SUBCUTANEOUS EVERY 8 HOURS
Status: DISCONTINUED | OUTPATIENT
Start: 2019-10-10 | End: 2019-10-13 | Stop reason: HOSPADM

## 2019-10-10 RX ORDER — RAMELTEON 8 MG/1
8 TABLET ORAL NIGHTLY PRN
Status: DISCONTINUED | OUTPATIENT
Start: 2019-10-10 | End: 2019-10-13 | Stop reason: HOSPADM

## 2019-10-10 RX ORDER — POLYETHYLENE GLYCOL 3350 17 G/17G
17 POWDER, FOR SOLUTION ORAL DAILY PRN
Status: DISCONTINUED | OUTPATIENT
Start: 2019-10-10 | End: 2019-10-13 | Stop reason: HOSPADM

## 2019-10-10 RX ORDER — AMLODIPINE BESYLATE 10 MG/1
10 TABLET ORAL DAILY
Status: DISCONTINUED | OUTPATIENT
Start: 2019-10-10 | End: 2019-10-13 | Stop reason: HOSPADM

## 2019-10-10 RX ORDER — HEPARIN SODIUM 5000 [USP'U]/ML
5000 INJECTION, SOLUTION INTRAVENOUS; SUBCUTANEOUS EVERY 8 HOURS
Status: DISCONTINUED | OUTPATIENT
Start: 2019-10-10 | End: 2019-10-10

## 2019-10-10 RX ORDER — SODIUM CHLORIDE 9 MG/ML
INJECTION, SOLUTION INTRAVENOUS CONTINUOUS
Status: ACTIVE | OUTPATIENT
Start: 2019-10-10 | End: 2019-10-10

## 2019-10-10 RX ORDER — INSULIN ASPART 100 [IU]/ML
0-10 INJECTION, SOLUTION INTRAVENOUS; SUBCUTANEOUS
Status: DISCONTINUED | OUTPATIENT
Start: 2019-10-10 | End: 2019-10-11

## 2019-10-10 RX ORDER — IBUPROFEN 400 MG/1
800 TABLET ORAL 3 TIMES DAILY
Status: DISCONTINUED | OUTPATIENT
Start: 2019-10-10 | End: 2019-10-10

## 2019-10-10 RX ORDER — SODIUM CHLORIDE AND POTASSIUM CHLORIDE 150; 900 MG/100ML; MG/100ML
1000 INJECTION, SOLUTION INTRAVENOUS
Status: COMPLETED | OUTPATIENT
Start: 2019-10-10 | End: 2019-10-10

## 2019-10-10 RX ORDER — IBUPROFEN 200 MG
16 TABLET ORAL
Status: DISCONTINUED | OUTPATIENT
Start: 2019-10-10 | End: 2019-10-13 | Stop reason: HOSPADM

## 2019-10-10 RX ORDER — HEPARIN SODIUM 10000 [USP'U]/ML
7500 INJECTION, SOLUTION INTRAVENOUS; SUBCUTANEOUS EVERY 8 HOURS
Status: DISCONTINUED | OUTPATIENT
Start: 2019-10-10 | End: 2019-10-10

## 2019-10-10 RX ORDER — GABAPENTIN 300 MG/1
300 CAPSULE ORAL 3 TIMES DAILY
Status: DISCONTINUED | OUTPATIENT
Start: 2019-10-10 | End: 2019-10-13 | Stop reason: HOSPADM

## 2019-10-10 RX ORDER — SODIUM CHLORIDE 0.9 % (FLUSH) 0.9 %
10 SYRINGE (ML) INJECTION
Status: DISCONTINUED | OUTPATIENT
Start: 2019-10-10 | End: 2019-10-13 | Stop reason: HOSPADM

## 2019-10-10 RX ORDER — INSULIN ASPART 100 [IU]/ML
8-10 INJECTION, SOLUTION INTRAVENOUS; SUBCUTANEOUS
Status: DISCONTINUED | OUTPATIENT
Start: 2019-10-11 | End: 2019-10-11

## 2019-10-10 RX ORDER — GLUCAGON 1 MG
1 KIT INJECTION
Status: DISCONTINUED | OUTPATIENT
Start: 2019-10-10 | End: 2019-10-13 | Stop reason: HOSPADM

## 2019-10-10 RX ORDER — SODIUM,POTASSIUM PHOSPHATES 280-250MG
2 POWDER IN PACKET (EA) ORAL ONCE
Status: COMPLETED | OUTPATIENT
Start: 2019-10-10 | End: 2019-10-10

## 2019-10-10 RX ORDER — LOSARTAN POTASSIUM AND HYDROCHLOROTHIAZIDE 25; 100 MG/1; MG/1
1 TABLET ORAL DAILY
Status: DISCONTINUED | OUTPATIENT
Start: 2019-10-10 | End: 2019-10-10

## 2019-10-10 RX ORDER — IBUPROFEN 200 MG
24 TABLET ORAL
Status: DISCONTINUED | OUTPATIENT
Start: 2019-10-10 | End: 2019-10-13 | Stop reason: HOSPADM

## 2019-10-10 RX ADMIN — SENNOSIDES 8.6 MG: 8.6 TABLET, FILM COATED ORAL at 12:10

## 2019-10-10 RX ADMIN — SODIUM CHLORIDE 1000 ML: 0.9 INJECTION, SOLUTION INTRAVENOUS at 04:10

## 2019-10-10 RX ADMIN — SODIUM CHLORIDE 10 UNITS/HR: 9 INJECTION, SOLUTION INTRAVENOUS at 04:10

## 2019-10-10 RX ADMIN — AMLODIPINE BESYLATE 10 MG: 10 TABLET ORAL at 08:10

## 2019-10-10 RX ADMIN — INSULIN ASPART 4 UNITS: 100 INJECTION, SOLUTION INTRAVENOUS; SUBCUTANEOUS at 10:10

## 2019-10-10 RX ADMIN — GABAPENTIN 300 MG: 300 CAPSULE ORAL at 08:10

## 2019-10-10 RX ADMIN — SODIUM CHLORIDE AND POTASSIUM CHLORIDE 1000 ML: .9; .15 SOLUTION INTRAVENOUS at 04:10

## 2019-10-10 RX ADMIN — LOSARTAN POTASSIUM AND HYDROCHLOROTHIAZIDE 1 TABLET: 25; 100 TABLET ORAL at 08:10

## 2019-10-10 RX ADMIN — SODIUM CHLORIDE: 0.9 INJECTION, SOLUTION INTRAVENOUS at 10:10

## 2019-10-10 RX ADMIN — GABAPENTIN 300 MG: 300 CAPSULE ORAL at 02:10

## 2019-10-10 RX ADMIN — POTASSIUM & SODIUM PHOSPHATES POWDER PACK 280-160-250 MG 2 PACKET: 280-160-250 PACK at 11:10

## 2019-10-10 RX ADMIN — SODIUM CHLORIDE 5.5 UNITS/HR: 9 INJECTION, SOLUTION INTRAVENOUS at 12:10

## 2019-10-10 RX ADMIN — SODIUM CHLORIDE: 0.9 INJECTION, SOLUTION INTRAVENOUS at 09:10

## 2019-10-10 RX ADMIN — IBUPROFEN 800 MG: 400 TABLET, FILM COATED ORAL at 08:10

## 2019-10-10 RX ADMIN — HEPARIN SODIUM 7500 UNITS: 5000 INJECTION, SOLUTION INTRAVENOUS; SUBCUTANEOUS at 10:10

## 2019-10-10 RX ADMIN — SODIUM CHLORIDE 1000 ML: 0.9 INJECTION, SOLUTION INTRAVENOUS at 01:10

## 2019-10-10 NOTE — PLAN OF CARE
CM to bedside to assess patient. Pt able to answer questions. Spouse at bedside. Pt does not have a glucometer at home. Spouse has questions about what diabetic supplies may be needed and resources to assist with paying for supplies. Will discuss with .  Family will transport home.    PCP confirmed as Kareem Arroyo MD    Pharmacy   [facility] - [place], [place] - [address]  [address]  [place] [address]  Phone: [phone] Fax: [phone]    [id number] - [place] - [address] [facility] AT [facility] & [place]  [address] [facility] [address]  Phone: [phone] Fax: [phone]    [id number] - [place] - [address] [place] AT [facility] & [address] [address]  Phone: [phone] Fax: [phone]      Payor: MEDICAID / Plan: formerly [facility] / Product Type: Managed Medicaid /     Extended Emergency Contact Information  Primary Emergency Contact: Leena Li  Address: [address] [address] of [facility]  Home Phone: [phone]  Mobile Phone: [phone]  Relation: Spouse       10/10/19 1608   Discharge Assessment   Assessment Type Discharge Planning Assessment   Confirmed/corrected address and phone number on facesheet? Yes   Assessment information obtained from? Patient;Caregiver   Expected Length of Stay (days) 3   Communicated expected length of stay with patient/caregiver yes   Prior to hospitilization cognitive status: Alert/Oriented   Prior to hospitalization functional status: Independent   Current cognitive status: Alert/Oriented   Current Functional Status: Independent   Lives With spouse;child(ronnie), adult   Able to Return to Prior Arrangements yes   Is patient able to care for self after discharge? Yes   Who are your caregiver(s) and their phone number(s)? Leena Li  [phone]   Patient's perception of discharge disposition home or selfcare   Readmission Within the Last 30 Days no previous admission in last 30 days   Patient currently being followed by outpatient case management? No   Patient currently receives any other outside agency services? No   Equipment Currently Used at Home none   Do you have any problems affording any of your prescribed medications? No   Does the patient have transportation home? Yes   Transportation Anticipated family or friend will provide   Does the patient receive services at the [facility]mad[facility] Clinic? No   Discharge Plan A Home   Discharge Plan B Home   DME Needed Upon Discharge  glucometer;other (see comments)  (Diabetic supplies )   Patient/Family in Agreement with Plan yes     Julie Haase RN  Case Management [phone]

## 2019-10-10 NOTE — HPI
Paul Jones is a 43 year old male with right breast Stage 1B triple negative invasive ductal carcinoma s/p chemtherapy (last dose of taxol was on Tuesday) complicated by peripheral neuropathy, HTN, pre-diabetes who presented to the ED for dizziness. Patient states he received his last dose of chemotherapy 2 days ago, he also gets dexamethasone 10 mg with infusion. While at the office, his fasting BG was noted to be 430 and he was given insulin. Yesterday night he started to feel unwell and developed sudden onset dizziness upon standing. Prior to these, he noticed increased urination and thirst on Sunday. Patient states he had URI symptoms of cough and nasal congestion which he believes he contracted from his son. He also reports dehydration from being out of the sun and increased urination. He reports the last visit to his PCP was over a year ago and his last A1c was 5.8 in 2017 per chart review. He has a strong family history of Type 2 DM in both parents and grandmother. Denies chest pain, SOB. He admits to chronic RLE as a result to chemotherapy. An US was performed 2 months ago that was negative for DVT.    In the ED BG was noted to be at 832, he was started on insulin drip and received 3 boluses of NS.

## 2019-10-10 NOTE — SUBJECTIVE & OBJECTIVE
Interval HPI:   Overnight events: In the ED BG was noted to be at 832, he was started on insulin drip and received 3 boluses of NS. IV insulin infusion rates ranging from 5.5-19 u/hr.   Eating:   NPO  Nausea: No  Hypoglycemia and intervention: No  Fever: No  TPN and/or TF: No  If yes, type of TF/TPN and rate: none    PMH, PSH, FH, SH  reviewed     ROS:  Constitutional: Negative for weight changes.  Eyes: Negative for visual disturbance.  Respiratory: Negative for cough.   Cardiovascular: Negative for chest pain.  Gastrointestinal: Negative for nausea.  Endocrine: Positive for polyuria, polydipsia.  Musculoskeletal: Negative for back pain.  Skin: Negative for rash.  Neurological: Positive for dizziness. Negative for syncope.  Psychiatric/Behavioral: Negative for depression.    Review of Systems    Current Medications and/or Treatments Impacting Glycemic Control  Immunotherapy:    Immunosuppressants     None        Steroids:   Hormones (From admission, onward)    None        Pressors:    Autonomic Drugs (From admission, onward)    None        Hyperglycemia/Diabetes Medications:   Antihyperglycemics (From admission, onward)    Start     Stop Route Frequency Ordered    10/10/19 0515  insulin regular (Humulin R) 100 Units in sodium chloride 0.9% 100 mL infusion  (INSULIN INFUSIONS)     Question:  Insulin Rate Adjustment (DO NOT MODIFY ANSWER)  Answer:  [email or web address]    -- IV Continuous 10/10/19 0406             PHYSICAL EXAMINATION:  Vitals:    10/10/19 1537   BP:    Pulse: 79   Resp:    Temp:      Body mass index is 52.7 kg/m².    Physical Exam   Constitutional: Well developed, well nourished, obese, NAD.  ENT: External ears no masses with nose patent; normal hearing.  Neck: Supple; trachea midline.  Cardiovascular: Normal heart sounds, no LE edema. DP +2 bilaterally.  Lungs: Normal effort; lungs anterior bilaterally clear to auscultation.  Abdomen: Soft, no  masses, no hernias.  MS: No clubbing or cyanosis of nails noted;  unable to assess gait.  Skin: No rashes, lesions, or ulcers; no nodules. Injection sites are ok. No lipo hypertropthy or atrophy.  Psychiatric: Good judgement and insight; normal mood and affect.  Neurological: Cranial nerves are grossly intact.  Foot: Nails in good condition, no amputations noted.

## 2019-10-10 NOTE — ASSESSMENT & PLAN NOTE
BG goal 140-180  BG trending down throughout the day on IV intensive insulin protocol. Will discontinue and start transition insulin infusion and progress diet to ADA this evening.     Plan:  Start IV transition insulin infusion at 4 u/hr with stepdown parameters. (30% reduction from current insulin infusion rate)  Start Novolog 8-10 units TID with meals (0.3 u/kg weight based dosing)  Low dose correction scale prn BG excursions  BG monitoring ac/hs/0200    ** Please call Endocrine for any BG related issues **    Discharge plan:  Would recommend patient discharge on MDI regimen given current a1c. (Levemir/ Novolog dose TBD). Pt will need glucometer and testing supplies prior to discharge. Will need to schedule patient for follow up at [facility].

## 2019-10-10 NOTE — HPI
Reason for Consult: Management of T2DM, Hyperglycemia     Surgical Procedure and Date: N/A    Lab Results   Component Value Date    HGBA1C 9.8 (H) 10/10/2019     Diabetes diagnosis year: new onset T2DM    Home Diabetes Medications:  None    How often checking glucose at home? not checking   BG readings on regimen: not checking  Hypoglycemia on the regimen?  No  Missed doses on regimen?  No    Diabetes Complications include:     Hyperglycemia    Complicating diabetes co morbidities:   Active Cancer and Glucocorticoid use       HPI:   Patient is a 43 y.o. male with a diagnosis of right breast Stage 1B triple negative invasive ductal carcinoma s/p chemtherapy (last dose of taxol was on Tuesday) complicated by peripheral neuropathy, HTN, pre-diabetes who presented to the ED for dizziness. Received last dose of chemotherapy 2 days ago, also gets dexamethasone 10 mg with infusion. While at the office, his fasting BG was noted to be 430  and he was given insulin. Yesterday night he started to feel unwell and developed sudden onset dizziness upon standing. Prior to these, he noticed increased urination and thirst on Sunday. He reports the last visit to his PCP was over a year ago and his last A1c was 5.8 in 2017 per chart review. He has a strong family history of Type 2 DM in both parents and grandmother. Endocrinology consulted for management of T2DM/hyperglycemia.

## 2019-10-10 NOTE — ED NOTES
Pharmacy changed heparin order but still only pulling 5000 unit/mL vials. Pharmacy called again. Informed hospital does not stock [id number] unit/mL vials.

## 2019-10-10 NOTE — ASSESSMENT & PLAN NOTE
Patient with Stage 1B invasive ductal carcinoma in right breast diagnosed in May of 2019, s/p neoadjuvant with doxorubucin and cyclophohphamide followed by taxol (completed 12/12 treatments) with shrinkage in tumor.     -Patient is scheduled to see Dr. Whyte (breast surgeon) next week to discuss resection

## 2019-10-10 NOTE — PLAN OF CARE
Patient admitted from ER. Oriented to setting, family at bedside. Insulin gtt infusing at 4units/hr, NS infusing at 200cc/hr. VS stable. Plan of care reviewed with patient and wife, questions answered.

## 2019-10-10 NOTE — ASSESSMENT & PLAN NOTE
Steroid Induced diabetes   Hyperosmolar hyperglycemic state  Patient with history of pre-diabetes (A1c 5.6 in 2017) and strong diabetes family history, receiving chemotherapy with dexamethasone presenting with dizziness, polydipsia and polyuria noted to have elevated glucose levels in the ER.   Hyperglycemia 2/2 to steroid use (dexamethasone weekly), dehydration and recent URI   Blood glucose 832 on presentation, BHB 0.6 without metabolic acidosis.   Received 3 boluses of NS and was started on insulin drip    Plan   -Endocrine recommendations: Insulin drip 4units/hr till BG <100, transitioning with 16 units of prandial insulin TID.   -Will require basal, prandial insulin and metformin on discharge.   -POCT AC/HS; continue diabetic diet   -Provided educational materials for diabetes management   -Follow up with endocrine outpatient

## 2019-10-10 NOTE — ED TRIAGE NOTES
Patient arrived by wheelchair c/o weakness and dizziness x 1 hour ago. Patient received chemotherapy yesterday for Breast CA. Patient also reports polyuria and dry mouth. No reports of n/v or any other symptoms.

## 2019-10-10 NOTE — TELEPHONE ENCOUNTER
Please see WHILL message and advise. Informed patient you can see all documentation and to schedule hospital follow after discharge.

## 2019-10-10 NOTE — PROVIDER PROGRESS NOTES - EMERGENCY DEPT.
Signed out to me pending CMP for further disposition.  CMP results showing glucose above 100, no sign of DKA, potassium below 5, creatinine slightly increased to 2.  Another liter saline was ordered, as well as saline with potassium rider.  Patient remains hemodynamically stable, feels improved after initial saline bolus.  Initially discussed the case with Hematology Oncology for admission, however they discussed the case with hospital medicine, and feel he would be better cared for on hospital medicine service.  Admitted to Hospital Medicine without further incident.    Critical Care Time    Critical care time was provided for 35 minutes exclusive of other billable procedures and teaching time for the support of metabolic organ system where the potential for death, shock, or further decline was possible. Critical care time can include documentation, discussion with consultants, developing a care plan, as well as direct patient care.

## 2019-10-10 NOTE — SUBJECTIVE & OBJECTIVE
Past Medical History:   Diagnosis Date    HTN (hypertension)     Leg edema, right     Prediabetes     Therapy     marital counseling       Past Surgical History:   Procedure Laterality Date    INSERTION OF TUNNELED CENTRAL VENOUS CATHETER (CVC) WITH SUBCUTANEOUS PORT Left 5/24/2019    Procedure: [id number];  Surgeon: Shima Whyte MD;  Location: [facility] OR [address] [place] [address];  Service: General;  Laterality: Left;       Review of patient's allergies indicates:  No Known Allergies    No current facility-administered medications on file prior to encounter.      Current Outpatient Medications on File Prior to Encounter   Medication Sig    amLODIPine (NORVASC) 10 MG tablet TAKE 1 TABLET (10 MG) BY MOUTH EVERY DAY    azithromycin (ZITHROMAX Z-DAWOOD) 250 MG tablet Take 2 tablets (500 mg) on  Day 1,  followed by 1 tablet (250 mg) once daily on Days 2 through 5.    colchicine 0.6 mg tablet Take 1 tablet (0.6 mg total) by mouth 2 (two) times daily as needed.    gabapentin (NEURONTIN) 300 MG capsule Take 1 capsule (300 mg total) by mouth 3 (three) times daily.    ibuprofen (ADVIL,MOTRIN) 800 MG tablet Take 800 mg by mouth 3 (three) times daily.    lidocaine-prilocaine (EMLA) cream Apply topically to affected area 45 minutes before port access    losartan-hydrochlorothiazide 100-25 mg (HYZAAR) 100-25 mg per tablet TAKE 1 TABLET BY MOUTH EVERY DAY    ondansetron (ZOFRAN-ODT) 8 MG TbDL Take 1 tablet (8 mg total) by mouth every 12 (twelve) hours as needed.    tadalafil (CIALIS) 5 MG tablet Take 2 tablets (10 mg total) by mouth daily as needed for Erectile Dysfunction.    temazepam (RESTORIL) 15 mg Cap Take 1 capsule (15 mg total) by mouth nightly as needed.    [DISCONTINUED] HYDROcodone-acetaminophen (NORCO) 5-325 mg per tablet Take 1 tablet by mouth every 6 (six) hours as needed for Pain.     Family History     Problem Relation (Age of Onset)    Breast cancer Paternal Grandmother    Colon cancer Maternal Grandfather     Diabetes Mother    Fibromyalgia Paternal Aunt    Hypertension Mother, Father    Lupus Father, Paternal Aunt    No Known Problems Maternal Grandmother, Paternal Grandfather, Sister, Maternal Aunt, Maternal Uncle, Paternal Uncle, Brother, Sister, Son, Son, Son, Son    Post-traumatic stress disorder Brother        Tobacco Use    Smoking status: Former Smoker     Packs/day: 0.25     Years: 15.00     Pack years: 3.75     Types: Cigarettes     Last attempt to quit: 2013     Years since quittin.8    Smokeless tobacco: Never Used    Tobacco comment: Social smoker with alcohol    Substance and Sexual Activity    Alcohol use: Yes     Alcohol/week: 0.0 standard drinks     Frequency: Monthly or less     Drinks per session: 1 or 2     Binge frequency: Never     Comment: Rarely    Drug use: Never    Sexual activity: Yes     Partners: Female     Birth control/protection: None     Review of Systems   Constitutional: Negative for chills and fever.   HENT: Negative for ear pain and sneezing.    Respiratory: Positive for cough. Negative for chest tightness, shortness of breath and wheezing.    Cardiovascular: Positive for leg swelling (Right leg). Negative for chest pain and palpitations.   Gastrointestinal: Negative for abdominal distention, abdominal pain, nausea and vomiting.   Endocrine: Positive for polydipsia and polyuria. Negative for polyphagia.   Genitourinary: Negative for difficulty urinating.   Musculoskeletal: Negative for arthralgias, back pain, neck pain and neck stiffness.   Skin: Negative for color change, rash and wound.   Neurological: Positive for dizziness, weakness and light-headedness. Negative for syncope and headaches.   Psychiatric/Behavioral: Negative for agitation, behavioral problems, confusion and decreased concentration.     Objective:     Vital Signs (Most Recent):  Temp: 98.1 °F (36.7 °C) (10/10/19 0037)  Pulse: 84 (10/10/19 0901)  Resp: 19 (10/10/19 0901)  BP: (!) 144/74 (10/10/19  0901)  SpO2: (!) 94 % (10/10/19 0901) Vital Signs (24h Range):  Temp:  [98.1 °F (36.7 °C)] 98.1 °F (36.7 °C)  Pulse:  [84-93] 84  Resp:  [16-20] 19  SpO2:  [93 %-99 %] 94 %  BP: (142-189)/(67-87) 144/74     Weight: (!) 183.7 kg (405 lb)  Body mass index is 52 kg/m².    Physical Exam   Constitutional: He is oriented to person, place, and time. He appears well-developed and well-nourished.   Obese   HENT:   Head: Normocephalic and atraumatic.   Eyes: EOM are normal. No scleral icterus.   Neck: Normal range of motion. Neck supple. No JVD present.   Cardiovascular: Normal rate, regular rhythm, normal heart sounds and intact distal pulses.   No murmur heard.  Pulmonary/Chest: Effort normal and breath sounds normal. No respiratory distress. He has no wheezes. He has no rales. He exhibits no tenderness.   Abdominal: Soft. Bowel sounds are normal. He exhibits no distension. There is no tenderness.   Musculoskeletal: Normal range of motion. He exhibits edema (R) Mild pitting edema .   Neurological: He is alert and oriented to person, place, and time.   Skin: Skin is warm and dry.   Skin tags noted in posterior neck         CRANIAL NERVES     CN III, IV, VI   Extraocular motions are normal.        Significant Labs:   A1C:   Recent Labs   Lab 10/10/19  0121   HGBA1C 9.8*     ABGs:   Recent Labs   Lab 10/10/19  0126   PH 7.373   PCO2 39.8   HCO3 23.2*   POCSATURATED 95   BE -2     CBC:   Recent Labs   Lab 10/10/19  0121   WBC 3.73*   HGB 10.8*   HCT 32.3*        CMP:   Recent Labs   Lab 10/10/19  0254 10/10/19  1038   * 137   K 4.8 3.9   CL 93* 105   CO2 21* 24   * 297*   BUN 22* 17   CREATININE 2.0* 1.5*   CALCIUM 9.2 9.6   PROT 7.7 7.8   ALBUMIN 4.2 4.3   BILITOT 0.9 0.5   ALKPHOS 80 77   AST 19 18   ALT 43 42   ANIONGAP 10 8   EGFRNONAA 40.3* 57.0*     Magnesium:   Recent Labs   Lab 10/10/19  1038   MG 2.3       Significant Imaging:   Imaging Results          X-Ray Chest AP Portable (Final result)  Result  time 10/10/19 01:57:37    Final result by Johnson Tran MD (10/10/19 01:57:37)                 Impression:      No acute findings in the chest.      Electronically signed by: Johnson Tran MD  Date:    10/10/2019  Time:    01:57             Narrative:    EXAMINATION:  XR CHEST AP PORTABLE    CLINICAL HISTORY:  hyperglycemia;    TECHNIQUE:  Single frontal view of the chest was performed.    COMPARISON:  May 24, 2019.    FINDINGS:  Left IJ catheter tip overlies the SVC.    No consolidation, pleural effusion or pneumothorax.    Cardiomediastinal silhouette is unremarkable.

## 2019-10-10 NOTE — H&P
[facility] Hospital Medicine  History & Physical    Patient Name: Paul Li Jr.  MRN: [id number]  Admission Date: 10/10/2019  Attending Physician: Katelynn Bae MD   Primary Care Provider: Kareem Arroyo MD    [facility] Medicine Team: [facility] 3 Lola Eric MD     Patient information was obtained from patient, spouse/SO and past medical records.     Subjective:     Principal Problem:New onset type 2 diabetes mellitus    Chief Complaint:   Chief Complaint   Patient presents with    Dizziness     began to feel lightheaded and weak 30 mins ago. last chemo treatment on yesterday. Denies SOB        HPI: Paul Jones is a 43 year old male with right breast Stage 1B triple negative invasive ductal carcinoma s/p chemtherapy (last dose of taxol was on Tuesday) complicated by peripheral neuropathy, HTN, pre-diabetes who presented to the ED for dizziness. Patient states he received his last dose of chemotherapy 2 days ago, he also gets dexamethasone 10 mg with infusion. While at the office, his fasting BG was noted to be 430 and he was given insulin. Yesterday night he started to feel unwell and developed sudden onset dizziness upon standing. Prior to these, he noticed increased urination and thirst on Sunday. Patient states he had URI symptoms of cough and nasal congestion which he believes he contracted from his son. He also reports dehydration from being out of the sun and increased urination. He reports the last visit to his PCP was over a year ago and his last A1c was 5.8 in 2017 per chart review. He has a strong family history of Type 2 DM in both parents and grandmother. Denies chest pain, SOB. He admits to chronic RLE as a result to chemotherapy. An US was performed 2 months ago that was negative for DVT.    In the ED BG was noted to be at 832, he was started on insulin drip and received 3 boluses of NS.     Past Medical History:   Diagnosis Date    HTN (hypertension)     Leg edema,  right     Prediabetes     Therapy     marital counseling       Past Surgical History:   Procedure Laterality Date    INSERTION OF TUNNELED CENTRAL VENOUS CATHETER (CVC) WITH SUBCUTANEOUS PORT Left 5/24/2019    Procedure: [id number];  Surgeon: Shima Whyte MD;  Location: [facility] OR [address] [place] [address];  Service: General;  Laterality: Left;       Review of patient's allergies indicates:  No Known Allergies    No current facility-administered medications on file prior to encounter.      Current Outpatient Medications on File Prior to Encounter   Medication Sig    amLODIPine (NORVASC) 10 MG tablet TAKE 1 TABLET (10 MG) BY MOUTH EVERY DAY    azithromycin (ZITHROMAX Z-DAWOOD) 250 MG tablet Take 2 tablets (500 mg) on  Day 1,  followed by 1 tablet (250 mg) once daily on Days 2 through 5.    colchicine 0.6 mg tablet Take 1 tablet (0.6 mg total) by mouth 2 (two) times daily as needed.    gabapentin (NEURONTIN) 300 MG capsule Take 1 capsule (300 mg total) by mouth 3 (three) times daily.    ibuprofen (ADVIL,MOTRIN) 800 MG tablet Take 800 mg by mouth 3 (three) times daily.    lidocaine-prilocaine (EMLA) cream Apply topically to affected area 45 minutes before port access    losartan-hydrochlorothiazide 100-25 mg (HYZAAR) 100-25 mg per tablet TAKE 1 TABLET BY MOUTH EVERY DAY    ondansetron (ZOFRAN-ODT) 8 MG TbDL Take 1 tablet (8 mg total) by mouth every 12 (twelve) hours as needed.    tadalafil (CIALIS) 5 MG tablet Take 2 tablets (10 mg total) by mouth daily as needed for Erectile Dysfunction.    temazepam (RESTORIL) 15 mg Cap Take 1 capsule (15 mg total) by mouth nightly as needed.    [DISCONTINUED] HYDROcodone-acetaminophen (NORCO) 5-325 mg per tablet Take 1 tablet by mouth every 6 (six) hours as needed for Pain.     Family History     Problem Relation (Age of Onset)    Breast cancer Paternal Grandmother    Colon cancer Maternal Grandfather    Diabetes Mother    Fibromyalgia Paternal Aunt    Hypertension Mother, Father     Lupus Father, Paternal Aunt    No Known Problems Maternal Grandmother, Paternal Grandfather, Sister, Maternal Aunt, Maternal Uncle, Paternal Uncle, Brother, Sister, Son, Son, Son, Son    Post-traumatic stress disorder Brother        Tobacco Use    Smoking status: Former Smoker     Packs/day: 0.25     Years: 15.00     Pack years: 3.75     Types: Cigarettes     Last attempt to quit: 2013     Years since quittin.8    Smokeless tobacco: Never Used    Tobacco comment: Social smoker with alcohol    Substance and Sexual Activity    Alcohol use: Yes     Alcohol/week: 0.0 standard drinks     Frequency: Monthly or less     Drinks per session: 1 or 2     Binge frequency: Never     Comment: Rarely    Drug use: Never    Sexual activity: Yes     Partners: Female     Birth control/protection: None     Review of Systems   Constitutional: Negative for chills and fever.   HENT: Negative for ear pain and sneezing.    Respiratory: Positive for cough. Negative for chest tightness, shortness of breath and wheezing.    Cardiovascular: Positive for leg swelling (Right leg). Negative for chest pain and palpitations.   Gastrointestinal: Negative for abdominal distention, abdominal pain, nausea and vomiting.   Endocrine: Positive for polydipsia and polyuria. Negative for polyphagia.   Genitourinary: Negative for difficulty urinating.   Musculoskeletal: Negative for arthralgias, back pain, neck pain and neck stiffness.   Skin: Negative for color change, rash and wound.   Neurological: Positive for dizziness, weakness and light-headedness. Negative for syncope and headaches.   Psychiatric/Behavioral: Negative for agitation, behavioral problems, confusion and decreased concentration.     Objective:     Vital Signs (Most Recent):  Temp: 98.1 °F (36.7 °C) (10/10/19 0037)  Pulse: 84 (10/10/19 0901)  Resp: 19 (10/10/19 0901)  BP: (!) 144/74 (10/10/19 0901)  SpO2: (!) 94 % (10/10/19 0901) Vital Signs (24h Range):  Temp:  [98.1 °F  (36.7 °C)] 98.1 °F (36.7 °C)  Pulse:  [84-93] 84  Resp:  [16-20] 19  SpO2:  [93 %-99 %] 94 %  BP: (142-189)/(67-87) 144/74     Weight: (!) 183.7 kg (405 lb)  Body mass index is 52 kg/m².    Physical Exam   Constitutional: He is oriented to person, place, and time. He appears well-developed and well-nourished.   Obese   HENT:   Head: Normocephalic and atraumatic.   Eyes: EOM are normal. No scleral icterus.   Neck: Normal range of motion. Neck supple. No JVD present.   Cardiovascular: Normal rate, regular rhythm, normal heart sounds and intact distal pulses.   No murmur heard.  Pulmonary/Chest: Effort normal and breath sounds normal. No respiratory distress. He has no wheezes. He has no rales. He exhibits no tenderness.   Abdominal: Soft. Bowel sounds are normal. He exhibits no distension. There is no tenderness.   Musculoskeletal: Normal range of motion. He exhibits edema (R) Mild pitting edema .   Neurological: He is alert and oriented to person, place, and time.   Skin: Skin is warm and dry.   Skin tags noted in posterior neck         CRANIAL NERVES     CN III, IV, VI   Extraocular motions are normal.        Significant Labs:   A1C:   Recent Labs   Lab 10/10/19  0121   HGBA1C 9.8*     ABGs:   Recent Labs   Lab 10/10/19  0126   PH 7.373   PCO2 39.8   HCO3 23.2*   POCSATURATED 95   BE -2     CBC:   Recent Labs   Lab 10/10/19  0121   WBC 3.73*   HGB 10.8*   HCT 32.3*        CMP:   Recent Labs   Lab 10/10/19  0254 10/10/19  1038   * 137   K 4.8 3.9   CL 93* 105   CO2 21* 24   * 297*   BUN 22* 17   CREATININE 2.0* 1.5*   CALCIUM 9.2 9.6   PROT 7.7 7.8   ALBUMIN 4.2 4.3   BILITOT 0.9 0.5   ALKPHOS 80 77   AST 19 18   ALT 43 42   ANIONGAP 10 8   EGFRNONAA 40.3* 57.0*     Magnesium:   Recent Labs   Lab 10/10/19  1038   MG 2.3       Significant Imaging:   Imaging Results          X-Ray Chest AP Portable (Final result)  Result time 10/10/19 01:57:37    Final result by Johnson Tran MD (10/10/19  01:57:37)                 Impression:      No acute findings in the chest.      Electronically signed by: Johnson Tran MD  Date:    10/10/2019  Time:    01:57             Narrative:    EXAMINATION:  XR CHEST AP PORTABLE    CLINICAL HISTORY:  hyperglycemia;    TECHNIQUE:  Single frontal view of the chest was performed.    COMPARISON:  May 24, 2019.    FINDINGS:  Left IJ catheter tip overlies the SVC.    No consolidation, pleural effusion or pneumothorax.    Cardiomediastinal silhouette is unremarkable.                                  Assessment/Plan:     * New onset type 2 diabetes mellitus  Steroid Induced diabetes   Patient with history of pre-diabetes (A1c 5.6 in 2017) and strong diabetes family history, receiving chemotherapy with dexamethasone presenting with dizziness, polydipsia and polyuria noted to have elevated glucose levels in the ER.   Hyperglycemia 2/2 to steroid use (dexamethasone weekly), dehydration and recent URI   Blood glucose 832 on presentation, BHB 0.6 without metabolic acidosis.   Received 3 boluses of NS and was started on insulin drip    Plan   -Consulted Endocrinology, appreciate recs  -Continue insulin drip and titrate with normogram  -Will transition to subcutaneous insulin per endocrine's recommendation.    -BG goal 140-180.  -POCT q1, CMP q4 hrs.  -Nutrition consult and diabetic counseling   -Diabetic diet         LEBRON (acute kidney injury)  Baseline Cr 1.2. Elevated (2.0) on presentation. Likely pre-renal 2/2 dehydration in the setting of increased fluid loss    Plan  -IV fluids   -Daily CMP      Malignant neoplasm involving both nipple and areola of right breast in male, estrogen receptor negative    Patient with Stage 1B invasive ductal carcinoma in right breast diagnosed in May of 2019, s/p neoadjuvant with doxorubucin and cyclophohphamide followed by taxol (completed 12/12 treatments) with shrinkage in tumor.     -Patient is scheduled to see Dr. 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   -Continue with gabapentin      Drug induced constipation  -Scheduled senna and PRN miralax      Morbid obesity with BMI of 50.0-59.9, adult  BMI of 52. Patient is sedentary.     Plan  -Nutrition consult  -Encourage weight loss and will provide educational materials      Essential hypertension  Continue with amlodipine and Hyzaar      VTE Risk Mitigation (From admission, onward)         Ordered     heparin (porcine) injection 7,500 Units  Every 8 hours      10/10/19 1405     IP VTE HIGH RISK PATIENT  Once      10/10/19 0524     Place DANELLE hose  Until discontinued      10/10/19 0524                   Lola Eric MD  Department of Hospital Medicine   [facility]

## 2019-10-10 NOTE — ASSESSMENT & PLAN NOTE
BMI of 52. Patient is sedentary.     Plan  -Nutrition consult  -Encourage weight loss and will provide educational materials

## 2019-10-10 NOTE — ED NOTES
Nurse uncomfortable administering 1.5mL subcutaneously. Called pharmacy for 67610 unit/mL concentration instead of 5000 unit/mL. Awaiting new orders.

## 2019-10-10 NOTE — CONSULTS
[facility]  Endocrinology  Diabetes Consult Note    Consult Requested by: Katelynn Bae MD   Reason for admit: New onset type 2 diabetes mellitus    HISTORY OF PRESENT ILLNESS:  Reason for Consult: Management of T2DM, Hyperglycemia     Surgical Procedure and Date: N/A    Lab Results   Component Value Date    HGBA1C 9.8 (H) 10/10/2019     Diabetes diagnosis year: new onset T2DM    Home Diabetes Medications:  None    How often checking glucose at home? not checking   BG readings on regimen: not checking  Hypoglycemia on the regimen?  No  Missed doses on regimen?  No    Diabetes Complications include:     Hyperglycemia    Complicating diabetes co morbidities:   Active Cancer and Glucocorticoid use       HPI:   Patient is a 43 y.o. male with a diagnosis of right breast Stage 1B triple negative invasive ductal carcinoma s/p chemtherapy (last dose of taxol was on Tuesday) complicated by peripheral neuropathy, HTN, pre-diabetes who presented to the ED for dizziness. Received last dose of chemotherapy 2 days ago, also gets dexamethasone 10 mg with infusion. While at the office, his fasting BG was noted to be 430 and he was given insulin. Yesterday night he started to feel unwell and developed sudden onset dizziness upon standing. Prior to these, he noticed increased urination and thirst on Sunday. He reports the last visit to his PCP was over a year ago and his last A1c was 5.8 in 2017 per chart review. He has a strong family history of Type 2 DM in both parents and grandmother. Endocrinology consulted for management of T2DM/hyperglycemia.            Interval HPI:   Overnight events: In the ED BG was noted to be at 832, he was started on insulin drip and received 3 boluses of NS. IV insulin infusion rates ranging from 5.5-19 u/hr.   Eating:   NPO  Nausea: No  Hypoglycemia and intervention: No  Fever: No  TPN and/or TF: No  If yes, type of TF/TPN and rate: none    PMH, PSH, FH, SH  reviewed      ROS:  Constitutional: Negative for weight changes.  Eyes: Negative for visual disturbance.  Respiratory: Negative for cough.   Cardiovascular: Negative for chest pain.  Gastrointestinal: Negative for nausea.  Endocrine: Positive for polyuria, polydipsia.  Musculoskeletal: Negative for back pain.  Skin: Negative for rash.  Neurological: Positive for dizziness. Negative for syncope.  Psychiatric/Behavioral: Negative for depression.    Review of Systems    Current Medications and/or Treatments Impacting Glycemic Control  Immunotherapy:    Immunosuppressants     None        Steroids:   Hormones (From admission, onward)    None        Pressors:    Autonomic Drugs (From admission, onward)    None        Hyperglycemia/Diabetes Medications:   Antihyperglycemics (From admission, onward)    Start     Stop Route Frequency Ordered    10/10/19 0515  insulin regular (Humulin R) 100 Units in sodium chloride 0.9% 100 mL infusion  (INSULIN INFUSIONS)     Question:  Insulin Rate Adjustment (DO NOT MODIFY ANSWER)  Answer:  \\[email or web address] [id number]    -- IV Continuous 10/10/19 0406             PHYSICAL EXAMINATION:  Vitals:    10/10/19 1537   BP:    Pulse: 79   Resp:    Temp:      Body mass index is 52.7 kg/m².    Physical Exam   Constitutional: Well developed, well nourished, obese, NAD.  ENT: External ears no masses with nose patent; normal hearing.  Neck: Supple; trachea midline.  Cardiovascular: Normal heart sounds, no LE edema. DP +2 bilaterally.  Lungs: Normal effort; lungs anterior bilaterally clear to auscultation.  Abdomen: Soft, no masses, no hernias.  MS: No clubbing or cyanosis of nails noted;  unable to assess gait.  Skin: No rashes, lesions, or ulcers; no nodules. Injection sites are ok. No lipo hypertropthy or atrophy.  Psychiatric: Good judgement and insight; normal mood and affect.  Neurological: Cranial nerves are grossly intact.  Foot: Nails in good condition, no  amputations noted.          Labs Reviewed and Include   Recent Labs   Lab 10/10/19  1342   *   CALCIUM 9.3   ALBUMIN 4.1   PROT 7.5      K 3.8   CO2 23      BUN 15   CREATININE 1.3   ALKPHOS 72   ALT 39   AST 18   BILITOT 0.5     Lab Results   Component Value Date    WBC 3.73 (L) 10/10/2019    HGB 10.8 (L) 10/10/2019    HCT 32.3 (L) 10/10/2019    MCV 81 (L) 10/10/2019     10/10/2019     No results for input(s): TSH, FREET4 in the last 168 hours.  Lab Results   Component Value Date    HGBA1C 9.8 (H) 10/10/2019       Nutritional status:   Body mass index is 52.7 kg/m².  Lab Results   Component Value Date    ALBUMIN 4.1 10/10/2019    ALBUMIN 4.3 10/10/2019    ALBUMIN 4.2 10/10/2019     No results found for: PREALBUMIN    Estimated Creatinine Clearance: 130.9 mL/min (based on SCr of 1.3 mg/dL).    Accu-Checks  Recent Labs     10/10/19  0644 10/10/19  0734 10/10/19  0831 10/10/19  0929 10/10/19  1038 10/10/19  1220 10/10/19  1333 10/10/19  1438 10/10/19  1535 10/10/19  1638   POCTGLUCOSE >500* 432* 388* 306* 289* 192* 207* 223* 234* 233*        ASSESSMENT and PLAN    * New onset type 2 diabetes mellitus  BG goal 140-180  BG trending down throughout the day on IV intensive insulin protocol. Will discontinue and start transition insulin infusion and progress diet to ADA this evening.     Plan:  Start IV transition insulin infusion at 4 u/hr with stepdown parameters. (30% reduction from current insulin infusion rate)  Start Novolog 8-10 units TID with meals (0.3 u/kg weight based dosing)  Low dose correction scale prn BG excursions  BG monitoring ac/hs/0200    ** Please call Endocrine for any BG related issues **    Discharge plan:  Would recommend patient discharge on MDI regimen given current a1c. (Levemir/ Novolog dose TBD). Pt will need glucometer and testing supplies prior to discharge. Will need to schedule patient for [place] [facility] [place] [facility].       Malignant neoplasm involving both  nipple and areola of right breast in male, estrogen receptor negative  Managed per primary team        Class 3 severe obesity due to excess calories with serious comorbidity and body mass index (BMI) of 50.0 to 59.9 in adult  Body mass index is 52.7 kg/m².   May increase insulin resistance.             Plan discussed with patient, family, and RN at bedside.     Елена Dave NP  Endocrinology  [facility]

## 2019-10-10 NOTE — ASSESSMENT & PLAN NOTE
Patient with history of pre-diabetes (A1c 5.6 in 2017) and strong diabetes family history, receiving chemotherapy with dexamethasone presenting with dizziness, polydipsia and polyuria noted to have elevated glucose levels in the ER.   Hyperglycemia 2/2 to steroid use (dexamethasone weekly), dehydration and recent URI   Blood glucose 832 on presentation, BHB 0.6 without metabolic acidosis.   Received 3 boluses of NS and was started on insulin drip    Plan   -Consulted Endocrinology, appreciate recs  -Continue insulin drip and titrate with normogram  -Will transition to   -BG goal 140-180.  -POCT q1, CMP q4 hrs.  -Nutrition consult and diabetic counseling   -Diabetic diet

## 2019-10-10 NOTE — PLAN OF CARE
10/10/19 0758   Post-Acute Status   Post-Acute Authorization Placement   Post-Acute Placement Status Awaiting Internal Medical Clearance     Patient not medically ready at this time, Post acute needs to be determined. SW will continue to follow up.    Nisha Loo LMSW  [facility]   [id number]

## 2019-10-10 NOTE — ED NOTES
Patient identifiers for Paul Li Jr. 43 y.o. male checked and correct.  Chief Complaint   Patient presents with    Dizziness     began to feel lightheaded and weak 30 mins ago. last chemo treatment on yesterday. Denies SOB     Past Medical History:   Diagnosis Date    HTN (hypertension)     Leg edema, right     Prediabetes     Therapy     marital counseling     Allergies reported: Review of patient's allergies indicates:  No Known Allergies      LOC: Patient is sleeping but easily arousable, aware of environment with appropriate affect, oriented x 3, and speaking appropriately.  APPEARANCE: Patient resting comfortably, in no acute distress. Patient is clean, well groomed, and clothing properly fastened.  SKIN: Warm and dry, color consistent with ethnicity, normal skin turgor and moist mucus membranes, skin intact, no bruising or breakdown noted.  MUSKULOSKELETAL: Patient moving all extremities spontaneously, no obvious swelling or deformities noted.  RESPIRATORY: Airway is open and patent, respirations are spontaneous, patient has a normal effort and rate, no accessory muscle use noted. Pt placed on continuous pulse ox, O2 sats noted at 96% on room air. Pt denies SOB.  CARDIAC: Patient has a normal rate and regular rhythm, no peripheral edema noted, capillary refill < 3 seconds. Pt denies chest pain.  GASTRO: No distention noted. Pt denies n/v/d.    Pt on cardiac monitor, continuous pulse ox, cycling blood pressures. Side rails up x2, call bell in reach, bed in low position with brake engaged. Wife at bedside. Will continue to monitor.

## 2019-10-10 NOTE — ED PROVIDER NOTES
"Encounter Date: 10/10/2019       History     Chief Complaint   Patient presents with    Dizziness     began to feel lightheaded and weak 30 mins ago. last chemo treatment on yesterday. Denies SOB     Mr. Li is a 43-year-old  male with HTN, prediabetes (last HgbA1c was 5.8), and triple negative right-sided breast cancer diagnosed May 2019 s/p neoadjuvant dose-dense doxorubicin and cyclophosphamide, now on weekly neoadjuvant taxol weekly with last session/12 cycle being yesterday who presents [place] [facility] ED on 10/10 with a chief complaint of dizziness. Patient reports receiving last cycle of chemotherapy yesterday, during which time BG was 400-500s requiring insulin. Patient reports no prior occurrences during chemo infusions in the past and at the time was asymptomatic. He returned home and was his usual self until around midnight when patient reports standing up from the couch after watching television and becoming severely dizzy. Patient reports experiencing light headiness/dizziness such that he felt he had a "hang over or was drunk".  He denies fall or LOC. Patient reports new onset of polydipsia and polyuria since this past Sunday but otherwise denies fever, chills, HA, chest pain, SOB, palpations, nausea, vomiting, abdominal pain, dysuria, or hematuria. In the ED, patients' vitals with /87 mmHg, HR 85 bpm, temperature 98.1 F, RR 19 saturating 98% on room air.         Review of patient's allergies indicates:  No Known Allergies  Past Medical History:   Diagnosis Date    HTN (hypertension)     Leg edema, right     Prediabetes     Therapy     marital counseling     Past Surgical History:   Procedure Laterality Date    INSERTION OF TUNNELED CENTRAL VENOUS CATHETER (CVC) WITH SUBCUTANEOUS PORT Left 5/24/2019    Procedure: [id number];  Surgeon: Shima Whyte MD;  Location: [facility] OR [address] [place] [address];  Service: General;  Laterality: Left;     Family History   Problem Relation Age of Onset " "   Hypertension Mother     Diabetes Mother     Hypertension Father     Lupus Father     Post-traumatic stress disorder Brother     No Known Problems Maternal Grandmother     Colon cancer Maternal Grandfather     Breast cancer Paternal Grandmother     No Known Problems Paternal Grandfather     No Known Problems Sister     No Known Problems Maternal Aunt     No Known Problems Maternal Uncle     Fibromyalgia Paternal Aunt     Lupus Paternal Aunt     No Known Problems Paternal Uncle     No Known Problems Brother     No Known Problems Sister     No Known Problems Son     No Known Problems Son     No Known Problems Son     No Known Problems Son      Social History     Tobacco Use    Smoking status: Former Smoker     Packs/day: 0.25     Years: 15.00     Pack years: 3.75     Types: Cigarettes     Last attempt to quit: 2013     Years since quittin.8    Smokeless tobacco: Never Used    Tobacco comment: Social smoker with alcohol    Substance Use Topics    Alcohol use: Yes     Alcohol/week: 0.0 standard drinks     Frequency: Monthly or less     Drinks per session: 1 or 2     Binge frequency: Never     Comment: Rarely    Drug use: Never     Review of Systems   Constitutional: Positive for fatigue and unexpected weight change. Negative for appetite change, chills, diaphoresis and fever.        Fatigue associated with chemotherapy    HENT: Negative for congestion, rhinorrhea and sore throat.    Eyes: Positive for visual disturbance. Negative for photophobia and pain.        "Room spinning"   Respiratory: Negative for cough, chest tightness, shortness of breath and wheezing.    Cardiovascular: Positive for leg swelling. Negative for chest pain and palpitations.   Gastrointestinal: Negative for abdominal distention, abdominal pain, blood in stool, constipation, diarrhea, nausea and vomiting.        Obese abdomen    Endocrine: Positive for polydipsia and polyuria.   Genitourinary: Positive for " frequency. Negative for difficulty urinating, dysuria, flank pain and hematuria.        Patient with history of testicular hydrocele    Musculoskeletal: Negative for back pain and neck pain.   Skin: Negative for pallor and rash.   Neurological: Positive for dizziness and light-headedness. Negative for seizures, syncope, speech difficulty, weakness and headaches.   Hematological: Negative for adenopathy. Does not bruise/bleed easily.   Psychiatric/Behavioral: Negative for behavioral problems and confusion.       Physical Exam     Initial Vitals [10/10/19 0037]   BP Pulse Resp Temp SpO2   (!) 189/87 84 20 98.1 °F (36.7 °C) 99 %      MAP       --         Physical Exam    Nursing note and vitals reviewed.  Constitutional: He appears well-developed and well-nourished. He is not diaphoretic. No distress.   HENT:   Head: Normocephalic and atraumatic.   Nose: Nose normal.   Mouth/Throat: Oropharynx is clear and moist. No oropharyngeal exudate.   Eyes: Conjunctivae and EOM are normal. Pupils are equal, round, and reactive to light. No scleral icterus.   Neck: Normal range of motion. Neck supple. No thyromegaly present. No JVD present.   Cardiovascular: Normal rate, regular rhythm and intact distal pulses. Exam reveals no gallop and no friction rub.    Murmur heard.   Systolic murmur is present with a grade of 3/6.  Pulmonary/Chest: Breath sounds normal. No respiratory distress. He has no wheezes. He has no rhonchi. He has no rales.   Port on left chest wall   Abdominal: Soft. Bowel sounds are normal. He exhibits no distension. There is no tenderness.   Musculoskeletal: He exhibits edema.   Trace lower extremity edema (R > L) patient reports this is a chronic issue   Neurological: He is alert and oriented to person, place, and time. He has normal strength.   Skin: Skin is warm and dry.   Psychiatric: He has a normal mood and affect. Thought content normal.         ED Course   Procedures  Labs Reviewed   CBC W/ AUTO  DIFFERENTIAL - Abnormal; Notable for the following components:       Result Value    WBC 3.73 (*)     RBC 3.97 (*)     Hemoglobin 10.8 (*)     Hematocrit 32.3 (*)     Mean Corpuscular Volume 81 (*)     Lymph # 0.9 (*)     All other components within normal limits   BETA - HYDROXYBUTYRATE, SERUM - Abnormal; Notable for the following components:    Beta-Hydroxybutyrate 0.6 (*)     All other components within normal limits   URINALYSIS, REFLEX TO URINE CULTURE - Abnormal; Notable for the following components:    Glucose, UA 3+ (*)     All other components within normal limits    Narrative:     Preferred Collection Type->Urine, Clean Catch   HEMOGLOBIN A1C - Abnormal; Notable for the following components:    Hemoglobin A1C 9.8 (*)     Estimated Avg Glucose 235 (*)     All other components within normal limits    Narrative:     add on test hemoglobin a1c per dr wood bryan order# [id number]   10/10/2019  02:55    ISTAT PROCEDURE - Abnormal; Notable for the following components:    POC PO2 80 (*)     POC HCO3 23.2 (*)     All other components within normal limits   TROPONIN I   HEMOGLOBIN A1C   URINALYSIS MICROSCOPIC    Narrative:     Preferred Collection Type->Urine, Clean Catch   COMPREHENSIVE METABOLIC PANEL   POCT GLUCOSE MONITORING CONTINUOUS        ECG Results          EKG 12-lead (Preliminary result)  Result time 10/10/19 01:40:17    ED Interpretation by Kimmie Miller MD (10/10/19 01:40:17)    00:54 NSR rate 85 normal axis normal intervals  Compared with ekg from 5/21/18 QT has lengthened                            Imaging Results          X-Ray Chest AP Portable (Final result)  Result time 10/10/19 01:57:37    Final result by Johnson Tran MD (10/10/19 01:57:37)                 Impression:      No acute findings in the chest.      Electronically signed by: Johnson Tran MD  Date:    10/10/2019  Time:    01:57             Narrative:    EXAMINATION:  XR CHEST AP PORTABLE    CLINICAL  "HISTORY:  hyperglycemia;    TECHNIQUE:  Single frontal view of the chest was performed.    COMPARISON:  May 24, 2019.    FINDINGS:  Left IJ catheter tip overlies the SVC.    No consolidation, pleural effusion or pneumothorax.    Cardiomediastinal silhouette is unremarkable.                                 Medical Decision Making:   History:   Old Medical Records: I decided to obtain old medical records.  Initial Assessment:   43 AAM with HTN, prediabetes, and triple negative right-sided breast cancer on taxol (completed 12 cycles as of 10/9) who presented to [facility] ED on 10/10 with a chief complaint of dizziness. Last cycle of chemo complicated by BG in 400-500s requiring insulin. He returned home and was his usual self until around midnight when patient reports standing up from the couch after watching television and becoming severely dizzy. Patient reports experiencing light headiness/dizziness such that he felt he had a "hang over or was drunk".  He denies fall or LOC. Patient reports new onset of polydipsia and polyuria since this past Sunday but otherwise denies fever, chills, HA, chest pain, SOB, palpations, nausea, vomiting, abdominal pain, dysuria, or hematuria. In the ED, patients' vitals with /87 mmHg, HR 85 bpm, temperature 98.1 F, RR 19 saturating 98% on room air.   Differential Diagnosis:   Differential Diagnosis includes but is not limited to:  New onset diabetes with hyperglycemia  HHS  DKA  Acute metabolic encephalopathy  Intravascular volume depletion  Other electrolyte derangement  PE or other thromboembolic event  Clinical Tests:   Lab Tests: Reviewed and Ordered  Radiological Study: Ordered and Reviewed  Medical Tests: Ordered and Reviewed  ED Management:  1:28 AM  Patient seen and examined. 1L NS bolus.  Obtained laboratory studies (i.e. CMP, CBC, UA, troponin, etc.) and CXR.                Attending Attestation:   Physician Attestation Statement for Resident:  As the supervising MD "   Physician Attestation Statement: I have personally seen and examined this patient.   I agree with the above history. -: Pt with dizziness, polyuria, polydipsia, and hyperglycemia  Finished last round of chemotherapy yesterday for triple neg breast cancer  Noted to have hyperglycemia yesterday and had to be given a dose of insulin, pt denies history of diabetes but does get prednisone once weekly with chemo  Prescribed azithro yesterday for mild cough productive of green sputum but has not started it yet, no fever or chills   As the supervising MD I agree with the above PE.   -: PT is AOx4, MAEW, no facial droop, no drift  RRR  No resp distress   As the supervising MD I agree with the above treatment, course, plan, and disposition.   -: CMP hemolyzed but poc glucose > 500 before iv NS and again after 1 L NS    3:34 AM  Pt care transferred to Dr. Mims. CMP is pending                       Clinical Impression:       ICD-10-CM ICD-9-CM   1. Hyperglycemia R73.9 790.29   2. Dizziness R42 780.4                                Randy Trevino MD  Resident  10/10/19 7022       Kimmie Miller MD  10/10/19 0560

## 2019-10-10 NOTE — ASSESSMENT & PLAN NOTE
Baseline Cr 1.2. Elevated (2.0) on presentation. Likely pre-renal 2/2 dehydration in the setting of increased fluid loss    Plan  -IV fluids   -Daily CMP

## 2019-10-11 ENCOUNTER — PATIENT MESSAGE (OUTPATIENT)
Dept: HEMATOLOGY/ONCOLOGY | Facility: CLINIC | Age: 44
End: 2019-10-11

## 2019-10-11 ENCOUNTER — PATIENT OUTREACH (OUTPATIENT)
Dept: ADMINISTRATIVE | Facility: HOSPITAL | Age: 44
End: 2019-10-11

## 2019-10-11 LAB
ALBUMIN SERPL BCP-MCNC: 3.5 G/DL (ref 3.5–5.2)
ALBUMIN SERPL BCP-MCNC: 3.6 G/DL (ref 3.5–5.2)
ALBUMIN SERPL BCP-MCNC: 3.8 G/DL (ref 3.5–5.2)
ALBUMIN SERPL BCP-MCNC: 3.8 G/DL (ref 3.5–5.2)
ALP SERPL-CCNC: 61 U/L (ref 55–135)
ALP SERPL-CCNC: 63 U/L (ref 55–135)
ALP SERPL-CCNC: 66 U/L (ref 55–135)
ALP SERPL-CCNC: 66 U/L (ref 55–135)
ALP SERPL-CCNC: 69 U/L (ref 55–135)
ALP SERPL-CCNC: 70 U/L (ref 55–135)
ALT SERPL W/O P-5'-P-CCNC: 37 U/L (ref 10–44)
ALT SERPL W/O P-5'-P-CCNC: 37 U/L (ref 10–44)
ALT SERPL W/O P-5'-P-CCNC: 43 U/L (ref 10–44)
ALT SERPL W/O P-5'-P-CCNC: 43 U/L (ref 10–44)
ALT SERPL W/O P-5'-P-CCNC: 46 U/L (ref 10–44)
ALT SERPL W/O P-5'-P-CCNC: 48 U/L (ref 10–44)
ANION GAP SERPL CALC-SCNC: 10 MMOL/L (ref 8–16)
ANION GAP SERPL CALC-SCNC: 8 MMOL/L (ref 8–16)
ANION GAP SERPL CALC-SCNC: 9 MMOL/L (ref 8–16)
ANION GAP SERPL CALC-SCNC: 9 MMOL/L (ref 8–16)
ANISOCYTOSIS BLD QL SMEAR: SLIGHT
AST SERPL-CCNC: 20 U/L (ref 10–40)
AST SERPL-CCNC: 20 U/L (ref 10–40)
AST SERPL-CCNC: 26 U/L (ref 10–40)
AST SERPL-CCNC: 29 U/L (ref 10–40)
AST SERPL-CCNC: 32 U/L (ref 10–40)
AST SERPL-CCNC: 32 U/L (ref 10–40)
BASOPHILS # BLD AUTO: 0.01 K/UL (ref 0–0.2)
BASOPHILS NFR BLD: 0.4 % (ref 0–1.9)
BILIRUB SERPL-MCNC: 0.3 MG/DL (ref 0.1–1)
BILIRUB SERPL-MCNC: 0.4 MG/DL (ref 0.1–1)
BILIRUB SERPL-MCNC: 0.4 MG/DL (ref 0.1–1)
BILIRUB SERPL-MCNC: 0.5 MG/DL (ref 0.1–1)
BILIRUB SERPL-MCNC: 0.5 MG/DL (ref 0.1–1)
BILIRUB SERPL-MCNC: 0.6 MG/DL (ref 0.1–1)
BUN SERPL-MCNC: 10 MG/DL (ref 6–20)
BUN SERPL-MCNC: 11 MG/DL (ref 6–20)
BUN SERPL-MCNC: 11 MG/DL (ref 6–20)
BUN SERPL-MCNC: 12 MG/DL (ref 6–20)
BUN SERPL-MCNC: 12 MG/DL (ref 6–20)
BUN SERPL-MCNC: 14 MG/DL (ref 6–20)
CALCIUM SERPL-MCNC: 8.5 MG/DL (ref 8.7–10.5)
CALCIUM SERPL-MCNC: 8.5 MG/DL (ref 8.7–10.5)
CALCIUM SERPL-MCNC: 8.7 MG/DL (ref 8.7–10.5)
CALCIUM SERPL-MCNC: 8.9 MG/DL (ref 8.7–10.5)
CALCIUM SERPL-MCNC: 9.2 MG/DL (ref 8.7–10.5)
CALCIUM SERPL-MCNC: 9.4 MG/DL (ref 8.7–10.5)
CHLORIDE SERPL-SCNC: 103 MMOL/L (ref 95–110)
CHLORIDE SERPL-SCNC: 104 MMOL/L (ref 95–110)
CHLORIDE SERPL-SCNC: 105 MMOL/L (ref 95–110)
CHLORIDE SERPL-SCNC: 105 MMOL/L (ref 95–110)
CHLORIDE SERPL-SCNC: 106 MMOL/L (ref 95–110)
CHLORIDE SERPL-SCNC: 107 MMOL/L (ref 95–110)
CO2 SERPL-SCNC: 19 MMOL/L (ref 23–29)
CO2 SERPL-SCNC: 21 MMOL/L (ref 23–29)
CO2 SERPL-SCNC: 22 MMOL/L (ref 23–29)
CREAT SERPL-MCNC: 1.1 MG/DL (ref 0.5–1.4)
CREAT SERPL-MCNC: 1.2 MG/DL (ref 0.5–1.4)
DIFFERENTIAL METHOD: ABNORMAL
EOSINOPHIL # BLD AUTO: 0 K/UL (ref 0–0.5)
EOSINOPHIL NFR BLD: 0.7 % (ref 0–8)
ERYTHROCYTE [DISTWIDTH] IN BLOOD BY AUTOMATED COUNT: 13.9 % (ref 11.5–14.5)
EST. GFR  (AFRICAN AMERICAN): >60 ML/MIN/1.73 M^2
EST. GFR  (NON AFRICAN AMERICAN): >60 ML/MIN/1.73 M^2
GLUCOSE SERPL-MCNC: 213 MG/DL (ref 70–110)
GLUCOSE SERPL-MCNC: 243 MG/DL (ref 70–110)
GLUCOSE SERPL-MCNC: 271 MG/DL (ref 70–110)
GLUCOSE SERPL-MCNC: 280 MG/DL (ref 70–110)
GLUCOSE SERPL-MCNC: 321 MG/DL (ref 70–110)
GLUCOSE SERPL-MCNC: 337 MG/DL (ref 70–110)
HCT VFR BLD AUTO: 30.6 % (ref 40–54)
HGB BLD-MCNC: 10.2 G/DL (ref 14–18)
HYPOCHROMIA BLD QL SMEAR: ABNORMAL
IMM GRANULOCYTES # BLD AUTO: 0.01 K/UL (ref 0–0.04)
IMM GRANULOCYTES NFR BLD AUTO: 0.4 % (ref 0–0.5)
LYMPHOCYTES # BLD AUTO: 1.1 K/UL (ref 1–4.8)
LYMPHOCYTES NFR BLD: 38.5 % (ref 18–48)
MAGNESIUM SERPL-MCNC: 1.9 MG/DL (ref 1.6–2.6)
MCH RBC QN AUTO: 27.3 PG (ref 27–31)
MCHC RBC AUTO-ENTMCNC: 33.3 G/DL (ref 32–36)
MCV RBC AUTO: 82 FL (ref 82–98)
MONOCYTES # BLD AUTO: 0.2 K/UL (ref 0.3–1)
MONOCYTES NFR BLD: 8.8 % (ref 4–15)
NEUTROPHILS # BLD AUTO: 1.4 K/UL (ref 1.8–7.7)
NEUTROPHILS NFR BLD: 51.2 % (ref 38–73)
NRBC BLD-RTO: 0 /100 WBC
OVALOCYTES BLD QL SMEAR: ABNORMAL
PHOSPHATE SERPL-MCNC: 3.8 MG/DL (ref 2.7–4.5)
PLATELET # BLD AUTO: 183 K/UL (ref 150–350)
PMV BLD AUTO: 12 FL (ref 9.2–12.9)
POCT GLUCOSE: 205 MG/DL (ref 70–110)
POCT GLUCOSE: 230 MG/DL (ref 70–110)
POCT GLUCOSE: 233 MG/DL (ref 70–110)
POCT GLUCOSE: 237 MG/DL (ref 70–110)
POCT GLUCOSE: 244 MG/DL (ref 70–110)
POCT GLUCOSE: 270 MG/DL (ref 70–110)
POCT GLUCOSE: 295 MG/DL (ref 70–110)
POCT GLUCOSE: 306 MG/DL (ref 70–110)
POIKILOCYTOSIS BLD QL SMEAR: SLIGHT
POLYCHROMASIA BLD QL SMEAR: ABNORMAL
POTASSIUM SERPL-SCNC: 3.7 MMOL/L (ref 3.5–5.1)
POTASSIUM SERPL-SCNC: 3.9 MMOL/L (ref 3.5–5.1)
POTASSIUM SERPL-SCNC: 4.1 MMOL/L (ref 3.5–5.1)
PROT SERPL-MCNC: 6.7 G/DL (ref 6–8.4)
PROT SERPL-MCNC: 6.7 G/DL (ref 6–8.4)
PROT SERPL-MCNC: 6.8 G/DL (ref 6–8.4)
PROT SERPL-MCNC: 7 G/DL (ref 6–8.4)
PROT SERPL-MCNC: 7.2 G/DL (ref 6–8.4)
PROT SERPL-MCNC: 7.5 G/DL (ref 6–8.4)
RBC # BLD AUTO: 3.74 M/UL (ref 4.6–6.2)
SODIUM SERPL-SCNC: 132 MMOL/L (ref 136–145)
SODIUM SERPL-SCNC: 135 MMOL/L (ref 136–145)
SODIUM SERPL-SCNC: 135 MMOL/L (ref 136–145)
SODIUM SERPL-SCNC: 137 MMOL/L (ref 136–145)
SODIUM SERPL-SCNC: 137 MMOL/L (ref 136–145)
SODIUM SERPL-SCNC: 138 MMOL/L (ref 136–145)
WBC # BLD AUTO: 2.73 K/UL (ref 3.9–12.7)

## 2019-10-11 PROCEDURE — C9399 UNCLASSIFIED DRUGS OR BIOLOG: HCPCS | Performed by: NURSE PRACTITIONER

## 2019-10-11 PROCEDURE — 63600175 PHARM REV CODE 636 W HCPCS: Performed by: STUDENT IN AN ORGANIZED HEALTH CARE EDUCATION/TRAINING PROGRAM

## 2019-10-11 PROCEDURE — 80053 COMPREHEN METABOLIC PANEL: CPT | Mod: 91

## 2019-10-11 PROCEDURE — 20600001 HC STEP DOWN PRIVATE ROOM

## 2019-10-11 PROCEDURE — 25000003 PHARM REV CODE 250: Performed by: STUDENT IN AN ORGANIZED HEALTH CARE EDUCATION/TRAINING PROGRAM

## 2019-10-11 PROCEDURE — 99232 PR SUBSEQUENT HOSPITAL CARE,LEVL II: ICD-10-PCS | Mod: ,,, | Performed by: NURSE PRACTITIONER

## 2019-10-11 PROCEDURE — 99233 PR SUBSEQUENT HOSPITAL CARE,LEVL III: ICD-10-PCS | Mod: ,,, | Performed by: HOSPITALIST

## 2019-10-11 PROCEDURE — 84100 ASSAY OF PHOSPHORUS: CPT

## 2019-10-11 PROCEDURE — 36415 COLL VENOUS BLD VENIPUNCTURE: CPT

## 2019-10-11 PROCEDURE — 85025 COMPLETE CBC W/AUTO DIFF WBC: CPT

## 2019-10-11 PROCEDURE — 99232 SBSQ HOSP IP/OBS MODERATE 35: CPT | Mod: ,,, | Performed by: NURSE PRACTITIONER

## 2019-10-11 PROCEDURE — 63600175 PHARM REV CODE 636 W HCPCS: Performed by: NURSE PRACTITIONER

## 2019-10-11 PROCEDURE — 99233 SBSQ HOSP IP/OBS HIGH 50: CPT | Mod: ,,, | Performed by: HOSPITALIST

## 2019-10-11 PROCEDURE — 83735 ASSAY OF MAGNESIUM: CPT

## 2019-10-11 RX ORDER — INSULIN ASPART 100 [IU]/ML
20 INJECTION, SOLUTION INTRAVENOUS; SUBCUTANEOUS
Status: DISCONTINUED | OUTPATIENT
Start: 2019-10-12 | End: 2019-10-13

## 2019-10-11 RX ORDER — INSULIN ASPART 100 [IU]/ML
16 INJECTION, SOLUTION INTRAVENOUS; SUBCUTANEOUS
Status: DISCONTINUED | OUTPATIENT
Start: 2019-10-11 | End: 2019-10-11

## 2019-10-11 RX ORDER — INSULIN ASPART 100 [IU]/ML
0-10 INJECTION, SOLUTION INTRAVENOUS; SUBCUTANEOUS
Status: DISCONTINUED | OUTPATIENT
Start: 2019-10-11 | End: 2019-10-13 | Stop reason: HOSPADM

## 2019-10-11 RX ORDER — LOSARTAN POTASSIUM AND HYDROCHLOROTHIAZIDE 25; 100 MG/1; MG/1
1 TABLET ORAL DAILY
Status: DISCONTINUED | OUTPATIENT
Start: 2019-10-12 | End: 2019-10-13 | Stop reason: HOSPADM

## 2019-10-11 RX ADMIN — SENNOSIDES 8.6 MG: 8.6 TABLET, FILM COATED ORAL at 08:10

## 2019-10-11 RX ADMIN — AMLODIPINE BESYLATE 10 MG: 10 TABLET ORAL at 08:10

## 2019-10-11 RX ADMIN — INSULIN ASPART 6 UNITS: 100 INJECTION, SOLUTION INTRAVENOUS; SUBCUTANEOUS at 02:10

## 2019-10-11 RX ADMIN — GABAPENTIN 300 MG: 300 CAPSULE ORAL at 08:10

## 2019-10-11 RX ADMIN — INSULIN ASPART 16 UNITS: 100 INJECTION, SOLUTION INTRAVENOUS; SUBCUTANEOUS at 04:10

## 2019-10-11 RX ADMIN — HEPARIN SODIUM 7500 UNITS: 5000 INJECTION, SOLUTION INTRAVENOUS; SUBCUTANEOUS at 10:10

## 2019-10-11 RX ADMIN — INSULIN ASPART 4 UNITS: 100 INJECTION, SOLUTION INTRAVENOUS; SUBCUTANEOUS at 08:10

## 2019-10-11 RX ADMIN — INSULIN DETEMIR 35 UNITS: 100 INJECTION, SOLUTION SUBCUTANEOUS at 11:10

## 2019-10-11 RX ADMIN — HEPARIN SODIUM 7500 UNITS: 5000 INJECTION, SOLUTION INTRAVENOUS; SUBCUTANEOUS at 02:10

## 2019-10-11 RX ADMIN — GABAPENTIN 300 MG: 300 CAPSULE ORAL at 02:10

## 2019-10-11 RX ADMIN — INSULIN ASPART 6 UNITS: 100 INJECTION, SOLUTION INTRAVENOUS; SUBCUTANEOUS at 11:10

## 2019-10-11 RX ADMIN — HEPARIN SODIUM 7500 UNITS: 5000 INJECTION, SOLUTION INTRAVENOUS; SUBCUTANEOUS at 06:10

## 2019-10-11 RX ADMIN — GABAPENTIN 300 MG: 300 CAPSULE ORAL at 10:10

## 2019-10-11 RX ADMIN — INSULIN ASPART 16 UNITS: 100 INJECTION, SOLUTION INTRAVENOUS; SUBCUTANEOUS at 11:10

## 2019-10-11 RX ADMIN — INSULIN ASPART 10 UNITS: 100 INJECTION, SOLUTION INTRAVENOUS; SUBCUTANEOUS at 07:10

## 2019-10-11 RX ADMIN — INSULIN ASPART 4 UNITS: 100 INJECTION, SOLUTION INTRAVENOUS; SUBCUTANEOUS at 04:10

## 2019-10-11 NOTE — SUBJECTIVE & OBJECTIVE
Interval History: NAEO. Continues to require insulin drip, transitioned to subq insulin. BG between 200-300 overnight     Review of Systems   Constitutional: Negative for chills and fever.   Respiratory: Negative for cough, shortness of breath and wheezing.    Cardiovascular: Negative for chest pain, palpitations and leg swelling.   Gastrointestinal: Negative for abdominal distention, abdominal pain, constipation and diarrhea.   Neurological: Negative for dizziness, weakness, light-headedness and headaches.   Psychiatric/Behavioral: Negative for behavioral problems and confusion.     Objective:     Vital Signs (Most Recent):  Temp: 98.1 °F (36.7 °C) (10/11/19 0727)  Pulse: 80 (10/11/19 1200)  Resp: 18 (10/11/19 0727)  BP: (!) 146/81 (10/11/19 0727)  SpO2: (!) 94 % (10/11/19 0727) Vital Signs (24h Range):  Temp:  [96.2 °F (35.7 °C)-98.1 °F (36.7 °C)] 98.1 °F (36.7 °C)  Pulse:  [64-81] 80  Resp:  [17-22] 18  SpO2:  [94 %-99 %] 94 %  BP: (120-151)/(61-81) 146/81     Weight: (!) 186.2 kg (410 lb 7.9 oz)  Body mass index is 52.7 kg/m².    Intake/Output Summary (Last 24 hours) at 10/11/2019 1436  Last data filed at 10/11/2019 0700  Gross per 24 hour   Intake 120 ml   Output 1750 ml   Net -1630 ml      Physical Exam   Constitutional: He is oriented to person, place, and time. He appears well-developed and well-nourished.   Obese   HENT:   Head: Normocephalic and atraumatic.   Eyes: EOM are normal. No scleral icterus.   Neck: Normal range of motion. Neck supple. No JVD present.   Cardiovascular: Normal rate, regular rhythm, normal heart sounds and intact distal pulses.   No murmur heard.  Pulmonary/Chest: Effort normal and breath sounds normal. No respiratory distress. He has no wheezes. He has no rales. He exhibits no tenderness.   Abdominal: Soft. Bowel sounds are normal. He exhibits no distension. There is no tenderness.   Musculoskeletal: Normal range of motion. He exhibits edema (R) Mild pitting edema .   Neurological:  He is alert and oriented to person, place, and time.   Skin: Skin is warm and dry.   Skin tags noted in posterior neck       Significant Labs:   CBC:   Recent Labs   Lab 10/10/19  0121 10/11/19  0438   WBC 3.73* 2.73*   HGB 10.8* 10.2*   HCT 32.3* 30.6*    183     CMP:   Recent Labs   Lab 10/11/19  0438 10/11/19  0943 10/11/19  1346    135* 132*   K 3.7 3.9 4.1    104 103   CO2 22* 22* 19*   * 337* 271*   BUN 11 11 12   CREATININE 1.2 1.2 1.2   CALCIUM 8.5* 8.7 8.9   PROT 6.7 7.0 7.5   ALBUMIN 3.6 3.6 3.8   BILITOT 0.5 0.6 0.5   ALKPHOS 61 66 69   AST 20 26 32   ALT 37 43 46*   ANIONGAP 9 9 10   EGFRNONAA >60.0 >60.0 >60.0     POCT Glucose:   Recent Labs   Lab 10/11/19  0650 10/11/19  0722 10/11/19  1115   POCTGLUCOSE 233* 244* 295*       Significant Imaging: I have reviewed all pertinent imaging results/findings within the past 24 hours.

## 2019-10-11 NOTE — PROGRESS NOTES
"[facility]  Endocrinology  Progress Note    Admit Date: 10/10/2019     Reason for Consult: Management of T2DM, Hyperglycemia     Surgical Procedure and Date: N/A    Lab Results   Component Value Date    HGBA1C 9.8 (H) 10/10/2019     Diabetes diagnosis year: new onset T2DM    Home Diabetes Medications:  None    How often checking glucose at home? not checking   BG readings on regimen: not checking  Hypoglycemia on the regimen?  No  Missed doses on regimen?  No    Diabetes Complications include:     Hyperglycemia    Complicating diabetes co morbidities:   Active Cancer and Glucocorticoid use       HPI:   Patient is a 43 y.o. male with a diagnosis of right breast Stage 1B triple negative invasive ductal carcinoma s/p chemtherapy (last dose of taxol was on Tuesday) complicated by peripheral neuropathy, HTN, pre-diabetes who presented to the ED for dizziness. Received last dose of chemotherapy 2 days ago, also gets dexamethasone 10 mg with infusion. While at the office, his fasting BG was noted to be 430  and he was given insulin. Yesterday night he started to feel unwell and developed sudden onset dizziness upon standing. Prior to these, he noticed increased urination and thirst on . He reports the last visit to his PCP was over a year ago and his last A1c was 5.8 in 2017 per chart review. He has a strong family history of Type 2 DM in both parents and grandmother. Endocrinology consulted for management of T2DM/hyperglycemia.            Interval HPI:   Overnight events: NAEON. BG remains slightly above goal ranges. On IV insulin infusion at 4 u/hr and scheduled SQ insulin with meals.   Eatin%  Nausea: No  Hypoglycemia and intervention: No  Fever: No  TPN and/or TF: No  If yes, type of TF/TPN and rate: none    BP (!) 146/81 (BP Location: Right arm, Patient Position: Sitting)   Pulse 65   Temp 98.1 °F (36.7 °C) (Oral)   Resp 18   Ht 6' 2" (1.88 m)   Wt (!) 186.2 kg (410 lb 7.9 oz)   " SpO2 (!) 94%   BMI 52.70 kg/m²      Labs Reviewed and Include    Recent Labs   Lab 10/11/19  0438   *   CALCIUM 8.5*   ALBUMIN 3.6   PROT 6.7      K 3.7   CO2 22*      BUN 11   CREATININE 1.2   ALKPHOS 61   ALT 37   AST 20   BILITOT 0.5     Lab Results   Component Value Date    WBC 2.73 (L) 10/11/2019    HGB 10.2 (L) 10/11/2019    HCT 30.6 (L) 10/11/2019    MCV 82 10/11/2019     10/11/2019     No results for input(s): TSH, FREET4 in the last 168 hours.  Lab Results   Component Value Date    HGBA1C 9.8 (H) 10/10/2019       Nutritional status:   Body mass index is 52.7 kg/m².  Lab Results   Component Value Date    ALBUMIN 3.6 10/11/2019    ALBUMIN 3.6 10/11/2019    ALBUMIN 3.8 10/10/2019     No results found for: PREALBUMIN    Estimated Creatinine Clearance: 141.9 mL/min (based on SCr of 1.2 mg/dL).    Accu-Checks  Recent Labs     10/10/19  1220 10/10/19  1333 10/10/19  1438 10/10/19  1535 10/10/19  1638 10/10/19  1750 10/10/19  2204 10/11/19  0220 10/11/19  0650 10/11/19  0722   POCTGLUCOSE 192* 207* 223* 234* 233* 227* 277* 306* 233* 244*       Current Medications and/or Treatments Impacting Glycemic Control  Immunotherapy:    Immunosuppressants     None        Steroids:   Hormones (From admission, onward)    None        Pressors:    Autonomic Drugs (From admission, onward)    None        Hyperglycemia/Diabetes Medications:   Antihyperglycemics (From admission, onward)    Start     Stop Route Frequency Ordered    10/11/19 0715  insulin aspart U-100 pen 8-10 Units      -- SubQ 3 times daily with meals 10/10/19 1736    10/10/19 1845  insulin regular (Humulin R) 100 Units in sodium chloride 0.9% 100 mL infusion      -- IV Continuous 10/10/19 1736    10/10/19 1835  insulin aspart U-100 pen 0-10 Units      -- SubQ As needed (PRN) 10/10/19 1736          ASSESSMENT and PLAN    * New onset type 2 diabetes mellitus  BG goal 140-180  BG still remains above goal ranges on current insulin regimen.  Prandial excursions noted and requiring prn SQ correction scale.    Plan:  Continue transition insulin infusion at 4 u/hr with stepdown parameters.  Increase Novolog to 16 units TID with meals (0.5 u/kg weight based dosing)   Change to mod dose correction scale prn BG excursions (given insulin resistance)   BG monitoring ac/hs/0200    Plan to stop IV insulin tonight and start Levemir     ** Please call Endocrine for any BG related issues **    ADDENDUM:   Stop IV transition insulin infusion tonight at 2100.  Start Levemir 35 units BID (0.8 u/kg dosing). First dose tonight at 2100.  Increase Novolog to 20 units TID with meals    Discharge plan:  Would recommend patient discharge on the following:  Levemir (dose TBD)  Novolog (dose TBD)  Metformin 500mg pills:  1 pill once daily at night for 3-4 nights THEN  1 pill with breakfast, 1 pill with dinner for next 3-4 days THEN  1 pill with breakfast, 2 pills with dinner for next 3-4 days THEN  2 pills with breakfast, 2 pills with dinner      Pt will need insulin pen needles, glucometer and testing supplies (insurance preferred) prior to discharge. Will need to schedule patient for [place] [facility] [place] and DM education.       Malignant neoplasm involving both nipple and areola of right breast in male, estrogen receptor negative  Managed per primary team        Class 3 severe obesity due to excess calories with serious comorbidity and body mass index (BMI) of 50.0 to 59.9 in adult  Body mass index is 52.7 kg/m².   May increase insulin resistance.             Елена Dave NP  Endocrinology  [facility]

## 2019-10-11 NOTE — ASSESSMENT & PLAN NOTE
Baseline Cr 1.2. Elevated (2.0) on presentation. Likely pre-renal 2/2 dehydration in the setting of increased fluid loss  -Improved

## 2019-10-11 NOTE — PHARMACY MED REC
"Admission Medication Reconciliation - Pharmacy Consult Note    The home medication history was taken by Rahel Kapadia, Pharmacy Technician.  Based on information gathered and subsequent review by the clinical pharmacist, the items below may need attention.     You may go to "Admission" then "Reconcile Home Medications" tabs to review and/or act upon these items.     Potentially problematic discrepancies with current MAR  o Patient IS taking the following which was not ordered upon admit  o Temazepam 15 mg QHS PRN     Please address this information as you see fit.  Feel free to contact us if you have any questions or require assistance.  Paige Ramírez  EXT [phone]     .    .            "

## 2019-10-11 NOTE — PLAN OF CARE
Pt. And spouse educated on the use of the Novolog insulin pen. Instructed on how to apply needle, prime needle for correct dosing, and to hold pen firmly against skin 5-7 seconds after medication release to ensure proper dosing.  Pt.'s Spouse did a return demonstration of applying pen needle to insulin pen and priming needle for correct dosing.

## 2019-10-11 NOTE — SUBJECTIVE & OBJECTIVE
"Interval HPI:   Overnight events: NAEON. BG remains slightly above goal ranges. On IV insulin infusion at 4 u/hr and scheduled SQ insulin with meals.   Eatin%  Nausea: No  Hypoglycemia and intervention: No  Fever: No  TPN and/or TF: No  If yes, type of TF/TPN and rate: none    BP (!) 146/81 (BP Location: Right arm, Patient Position: Sitting)   Pulse 65   Temp 98.1 °F (36.7 °C) (Oral)   Resp 18   Ht 6' 2" (1.88 m)   Wt (!) 186.2 kg (410 lb 7.9 oz)   SpO2 (!) 94%   BMI 52.70 kg/m²     Labs Reviewed and Include    Recent Labs   Lab 10/11/19  0438   *   CALCIUM 8.5*   ALBUMIN 3.6   PROT 6.7      K 3.7   CO2 22*      BUN 11   CREATININE 1.2   ALKPHOS 61   ALT 37   AST 20   BILITOT 0.5     Lab Results   Component Value Date    WBC 2.73 (L) 10/11/2019    HGB 10.2 (L) 10/11/2019    HCT 30.6 (L) 10/11/2019    MCV 82 10/11/2019     10/11/2019     No results for input(s): TSH, FREET4 in the last 168 hours.  Lab Results   Component Value Date    HGBA1C 9.8 (H) 10/10/2019       Nutritional status:   Body mass index is 52.7 kg/m².  Lab Results   Component Value Date    ALBUMIN 3.6 10/11/2019    ALBUMIN 3.6 10/11/2019    ALBUMIN 3.8 10/10/2019     No results found for: PREALBUMIN    Estimated Creatinine Clearance: 141.9 mL/min (based on SCr of 1.2 mg/dL).    Accu-Checks  Recent Labs     10/10/19  1220 10/10/19  1333 10/10/19  1438 10/10/19  1535 10/10/19  1638 10/10/19  1750 10/10/19  2204 10/11/19  0220 10/11/19  0650 10/11/19  0722   POCTGLUCOSE 192* 207* 223* 234* 233* 227* 277* 306* 233* 244*       Current Medications and/or Treatments Impacting Glycemic Control  Immunotherapy:    Immunosuppressants     None        Steroids:   Hormones (From admission, onward)    None        Pressors:    Autonomic Drugs (From admission, onward)    None        Hyperglycemia/Diabetes Medications:   Antihyperglycemics (From admission, onward)    Start     Stop Route Frequency Ordered    10/11/19 0715  " insulin aspart U-100 pen 8-10 Units      -- SubQ 3 times daily with meals 10/10/19 1736    10/10/19 1845  insulin regular (Humulin R) 100 Units in sodium chloride 0.9% 100 mL infusion      -- IV Continuous 10/10/19 1736    10/10/19 1835  insulin aspart U-100 pen 0-10 Units      -- SubQ As needed (PRN) 10/10/19 1736

## 2019-10-11 NOTE — PLAN OF CARE
Pt Aax4, breathing even and unlabored, call light in reach, bed in lowest position, family at bedside throughout shift. Provided diabetes education and instructed pt on self administration of insulin. Pt demonstrated and stated understanding. Administered medications per orders, pt tolerated well. VSS, frequent hourly rounding completed. No significant events throughout shift. Will continue to monitor.

## 2019-10-11 NOTE — ASSESSMENT & PLAN NOTE
BG goal 140-180  BG still remains above goal ranges on current insulin regimen. Prandial excursions noted and requiring prn SQ correction scale.    Plan:  Continue transition insulin infusion at 4 u/hr with stepdown parameters.  Increase Novolog to 16 units TID with meals (0.5 u/kg weight based dosing)   Change to mod dose correction scale prn BG excursions (given insulin resistance)   BG monitoring ac/hs/0200    Plan to stop IV insulin tonight and start Levemir     ** Please call Endocrine for any BG related issues **    Discharge plan:  Would recommend patient discharge on the following:  Levemir (dose TBD)  Novolog (dose TBD)  Metformin 500mg pills:  1 pill once daily at night for 3-4 nights THEN  1 pill with breakfast, 1 pill with dinner for next 3-4 days THEN  1 pill with breakfast, 2 pills with dinner for next 3-4 days THEN  2 pills with breakfast, 2 pills with dinner      Pt will need insulin pen needles, glucometer and testing supplies (insurance preferred) prior to discharge. Will need to schedule patient for follow up at [facility] endocrine clinic and DM education.

## 2019-10-11 NOTE — HOSPITAL COURSE
Patient admitted for [facility] with new-onset DM. On arrival, BG noted to be in 800s. Insulin drip was initially set at a high dose while in the ED. Patient is currently being transitioned to the subcutaneous insulin with the insulin drip set at 4 units/hr per endocrinology until BG<100. Patient continues to have elevated BG following transition to SQ insulin. Endocrine increasing insulin to get BG to goal. Blood glucose stable on 40U BID, Novolog 24 units with meals. Will add on metformin at home. Follow up with endocrine and primary care physician,.      Vitals:    10/13/19 1144   BP: (!) 152/71   Pulse: 75   Resp: 18   Temp: 98.1 °F (36.7 °C)     Constitutional: He is oriented to person, place, and time. He appears well-developed and well-nourished.   Obese   HENT:   Head: Normocephalic and atraumatic.   Eyes: EOM are normal. No scleral icterus.   Neck: Normal range of motion. Neck supple. No JVD present.   Cardiovascular: Normal rate, regular rhythm, normal heart sounds and intact distal pulses.   No murmur heard.  Pulmonary/Chest: Effort normal and breath sounds normal. No respiratory distress. He has no wheezes. He has no rales. He exhibits no tenderness.   Abdominal: Soft. Bowel sounds are normal. He exhibits no distension. There is no tenderness.   Musculoskeletal: Normal range of motion. He exhibits edema (R) Mild pitting edema .   Neurological: He is alert and oriented to person, place, and time.   Skin: Skin is warm and dry.   Skin tags noted in posterior neck

## 2019-10-11 NOTE — ASSESSMENT & PLAN NOTE
BMI of 52. Patient is sedentary.     Plan  -Encourage weight loss and provided educational materials

## 2019-10-11 NOTE — PROGRESS NOTES
[facility] Hospital Medicine  Progress Note    Patient Name: Paul Li Jr.  MRN: [id number]  Patient Class: IP- Inpatient   Admission Date: 10/10/2019  Length of Stay: 1 days  Attending Physician: Katelynn Bae MD  Primary Care Provider: Kareem Arroyo MD    [facility] Medicine Team: [facility] 3 Lola Eric MD    Subjective:     Principal Problem:New onset type 2 diabetes mellitus        HPI:  Paul Jones is a 43 year old male with right breast Stage 1B triple negative invasive ductal carcinoma s/p chemtherapy (last dose of taxol was on Tuesday) complicated by peripheral neuropathy, HTN, pre-diabetes who presented to the ED for dizziness. Patient states he received his last dose of chemotherapy 2 days ago, he also gets dexamethasone 10 mg with infusion. While at the office, his fasting BG was noted to be 430 and he was given insulin. Yesterday night he started to feel unwell and developed sudden onset dizziness upon standing. Prior to these, he noticed increased urination and thirst on Sunday. Patient states he had URI symptoms of cough and nasal congestion which he believes he contracted from his son. He also reports dehydration from being out of the sun and increased urination. He reports the last visit to his PCP was over a year ago and his last A1c was 5.8 in 2017 per chart review. He has a strong family history of Type 2 DM in both parents and grandmother. Denies chest pain, SOB. He admits to chronic RLE as a result to chemotherapy. An US was performed 2 months ago that was negative for DVT.    In the ED BG was noted to be at 832, he was started on insulin drip and received 3 boluses of NS.     Overview/Hospital Course:  Patient admitted for [facility] with new-onset DM. On arrival, BG noted to be in 800s. Insulin drip was initially set at a high dose while in the ED. Patient is currently being transitioned to the subcutaneous insulin with the insulin drip set at 4 units/hr per  endocrinology until BG<100    Interval History: NAEO. Continues to require insulin drip, transitioned to subq insulin. BG between 200-300 overnight     Review of Systems   Constitutional: Negative for chills and fever.   Respiratory: Negative for cough, shortness of breath and wheezing.    Cardiovascular: Negative for chest pain, palpitations and leg swelling.   Gastrointestinal: Negative for abdominal distention, abdominal pain, constipation and diarrhea.   Neurological: Negative for dizziness, weakness, light-headedness and headaches.   Psychiatric/Behavioral: Negative for behavioral problems and confusion.     Objective:     Vital Signs (Most Recent):  Temp: 98.1 °F (36.7 °C) (10/11/19 0727)  Pulse: 80 (10/11/19 1200)  Resp: 18 (10/11/19 0727)  BP: (!) 146/81 (10/11/19 0727)  SpO2: (!) 94 % (10/11/19 0727) Vital Signs (24h Range):  Temp:  [96.2 °F (35.7 °C)-98.1 °F (36.7 °C)] 98.1 °F (36.7 °C)  Pulse:  [64-81] 80  Resp:  [17-22] 18  SpO2:  [94 %-99 %] 94 %  BP: (120-151)/(61-81) 146/81     Weight: (!) 186.2 kg (410 lb 7.9 oz)  Body mass index is 52.7 kg/m².    Intake/Output Summary (Last 24 hours) at 10/11/2019 1436  Last data filed at 10/11/2019 0700  Gross per 24 hour   Intake 120 ml   Output 1750 ml   Net -1630 ml      Physical Exam   Constitutional: He is oriented to person, place, and time. He appears well-developed and well-nourished.   Obese   HENT:   Head: Normocephalic and atraumatic.   Eyes: EOM are normal. No scleral icterus.   Neck: Normal range of motion. Neck supple. No JVD present.   Cardiovascular: Normal rate, regular rhythm, normal heart sounds and intact distal pulses.   No murmur heard.  Pulmonary/Chest: Effort normal and breath sounds normal. No respiratory distress. He has no wheezes. He has no rales. He exhibits no tenderness.   Abdominal: Soft. Bowel sounds are normal. He exhibits no distension. There is no tenderness.   Musculoskeletal: Normal range of motion. He exhibits edema (R) Mild  pitting edema .   Neurological: He is alert and oriented to person, place, and time.   Skin: Skin is warm and dry.   Skin tags noted in posterior neck       Significant Labs:   CBC:   Recent Labs   Lab 10/10/19  0121 10/11/19  0438   WBC 3.73* 2.73*   HGB 10.8* 10.2*   HCT 32.3* 30.6*    183     CMP:   Recent Labs   Lab 10/11/19  0438 10/11/19  0943 10/11/19  1346    135* 132*   K 3.7 3.9 4.1    104 103   CO2 22* 22* 19*   * 337* 271*   BUN 11 11 12   CREATININE 1.2 1.2 1.2   CALCIUM 8.5* 8.7 8.9   PROT 6.7 7.0 7.5   ALBUMIN 3.6 3.6 3.8   BILITOT 0.5 0.6 0.5   ALKPHOS 61 66 69   AST 20 26 32   ALT 37 43 46*   ANIONGAP 9 9 10   EGFRNONAA >60.0 >60.0 >60.0     POCT Glucose:   Recent Labs   Lab 10/11/19  0650 10/11/19  0722 10/11/19  1115   POCTGLUCOSE 233* 244* 295*       Significant Imaging: I have reviewed all pertinent imaging results/findings within the past 24 hours.      Assessment/Plan:      * New onset type 2 diabetes mellitus  Steroid Induced diabetes   Hyperosmolar hyperglycemic state  Patient with history of pre-diabetes (A1c 5.6 in 2017) and strong diabetes family history, receiving chemotherapy with dexamethasone presenting with dizziness, polydipsia and polyuria noted to have elevated glucose levels in the ER.   Hyperglycemia 2/2 to steroid use (dexamethasone weekly), dehydration and recent URI   Blood glucose 832 on presentation, BHB 0.6 without metabolic acidosis.   Received 3 boluses of NS and was started on insulin drip    Plan   -Endocrine recommendations: Insulin drip 4units/hr till BG <100, transitioning with 16 units of prandial insulin TID.   -Will require basal, prandial insulin and metformin on discharge.   -POCT AC/HS; continue diabetic diet   -Provided educational materials for diabetes management   -Follow up with endocrine outpatient         LEBRON (acute kidney injury)  Baseline Cr 1.2. Elevated (2.0) on presentation. Likely pre-renal 2/2 dehydration in the setting  of increased fluid loss  -Improved      Malignant neoplasm involving both nipple and areola of right breast in male, estrogen receptor negative    Patient with Stage 1B invasive ductal carcinoma in right breast diagnosed in May of 2019, s/p neoadjuvant with doxorubucin and cyclophohphamide followed by taxol (completed 12/12 treatments) with shrinkage in tumor.     -Patient is scheduled to see Dr. 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   -Continue with gabapentin      Drug induced constipation  -Scheduled senna and PRN miralax      Morbid obesity with BMI of 50.0-59.9, adult  BMI of 52. Patient is sedentary.     Plan  -Encourage weight loss and provided educational materials      Essential hypertension  Continue with amlodipine and Hyzaar        VTE Risk Mitigation (From admission, onward)         Ordered     heparin (porcine) injection 7,500 Units  Every 8 hours      10/10/19 1405     IP VTE HIGH RISK PATIENT  Once      10/10/19 0524     Place DANELLE hose  Until discontinued      10/10/19 0524                      Lola Eric MD  Department of Hospital Medicine   [facility]

## 2019-10-11 NOTE — CONSULTS
Food & Nutrition  Education    Diet Education: Diabetic (A1C 9.8) - Newly onset   Time Spent: 30 minutes   Learners: Patient, pt's wife     Nutrition Education provided with handouts. All questions and concerns answered. Dietitian's contact information provided.   Comments: Pt lethargic at time of visit, spoke mainly w/ pt's wife. Educated her on the importance of carbohydrate counting, nutrition facts label reading, and consistent carbohydrate intake. Pt's wife very engaged in education, asked several question. She seemed interested in meeting w/ outpatient RD. Pt verbalized understanding of A1c and meaning.     Follow-Up: 10/21    Please re-consult as needed.    Thanks!  BOSTON Mcgovern, LDN

## 2019-10-12 ENCOUNTER — TELEPHONE (OUTPATIENT)
Dept: ENDOCRINOLOGY | Facility: HOSPITAL | Age: 44
End: 2019-10-12

## 2019-10-12 ENCOUNTER — PATIENT MESSAGE (OUTPATIENT)
Dept: ENDOCRINOLOGY | Facility: HOSPITAL | Age: 44
End: 2019-10-12

## 2019-10-12 PROBLEM — N17.9 AKI (ACUTE KIDNEY INJURY): Status: RESOLVED | Noted: 2019-10-10 | Resolved: 2019-10-12

## 2019-10-12 LAB
ALBUMIN SERPL BCP-MCNC: 3.5 G/DL (ref 3.5–5.2)
ALBUMIN SERPL BCP-MCNC: 3.6 G/DL (ref 3.5–5.2)
ALBUMIN SERPL BCP-MCNC: 3.7 G/DL (ref 3.5–5.2)
ALBUMIN SERPL BCP-MCNC: 3.8 G/DL (ref 3.5–5.2)
ALP SERPL-CCNC: 63 U/L (ref 55–135)
ALP SERPL-CCNC: 64 U/L (ref 55–135)
ALP SERPL-CCNC: 68 U/L (ref 55–135)
ALP SERPL-CCNC: 72 U/L (ref 55–135)
ALP SERPL-CCNC: 74 U/L (ref 55–135)
ALP SERPL-CCNC: 80 U/L (ref 55–135)
ALT SERPL W/O P-5'-P-CCNC: 45 U/L (ref 10–44)
ALT SERPL W/O P-5'-P-CCNC: 45 U/L (ref 10–44)
ALT SERPL W/O P-5'-P-CCNC: 47 U/L (ref 10–44)
ALT SERPL W/O P-5'-P-CCNC: 51 U/L (ref 10–44)
ALT SERPL W/O P-5'-P-CCNC: 52 U/L (ref 10–44)
ALT SERPL W/O P-5'-P-CCNC: 53 U/L (ref 10–44)
ANION GAP SERPL CALC-SCNC: 10 MMOL/L (ref 8–16)
ANION GAP SERPL CALC-SCNC: 11 MMOL/L (ref 8–16)
ANION GAP SERPL CALC-SCNC: 13 MMOL/L (ref 8–16)
ANION GAP SERPL CALC-SCNC: 7 MMOL/L (ref 8–16)
ANION GAP SERPL CALC-SCNC: 9 MMOL/L (ref 8–16)
ANION GAP SERPL CALC-SCNC: 9 MMOL/L (ref 8–16)
ANISOCYTOSIS BLD QL SMEAR: SLIGHT
AST SERPL-CCNC: 26 U/L (ref 10–40)
AST SERPL-CCNC: 28 U/L (ref 10–40)
AST SERPL-CCNC: 29 U/L (ref 10–40)
AST SERPL-CCNC: 32 U/L (ref 10–40)
AST SERPL-CCNC: 34 U/L (ref 10–40)
AST SERPL-CCNC: 36 U/L (ref 10–40)
BASOPHILS # BLD AUTO: 0.02 K/UL (ref 0–0.2)
BASOPHILS NFR BLD: 0.7 % (ref 0–1.9)
BILIRUB SERPL-MCNC: 0.3 MG/DL (ref 0.1–1)
BILIRUB SERPL-MCNC: 0.4 MG/DL (ref 0.1–1)
BILIRUB SERPL-MCNC: 0.4 MG/DL (ref 0.1–1)
BILIRUB SERPL-MCNC: 0.5 MG/DL (ref 0.1–1)
BUN SERPL-MCNC: 11 MG/DL (ref 6–20)
BUN SERPL-MCNC: 12 MG/DL (ref 6–20)
BUN SERPL-MCNC: 12 MG/DL (ref 6–20)
BUN SERPL-MCNC: 13 MG/DL (ref 6–20)
BUN SERPL-MCNC: 14 MG/DL (ref 6–20)
BUN SERPL-MCNC: 14 MG/DL (ref 6–20)
CALCIUM SERPL-MCNC: 8.9 MG/DL (ref 8.7–10.5)
CALCIUM SERPL-MCNC: 9.2 MG/DL (ref 8.7–10.5)
CALCIUM SERPL-MCNC: 9.4 MG/DL (ref 8.7–10.5)
CALCIUM SERPL-MCNC: 9.4 MG/DL (ref 8.7–10.5)
CALCIUM SERPL-MCNC: 9.5 MG/DL (ref 8.7–10.5)
CALCIUM SERPL-MCNC: 9.5 MG/DL (ref 8.7–10.5)
CHLORIDE SERPL-SCNC: 100 MMOL/L (ref 95–110)
CHLORIDE SERPL-SCNC: 100 MMOL/L (ref 95–110)
CHLORIDE SERPL-SCNC: 101 MMOL/L (ref 95–110)
CHLORIDE SERPL-SCNC: 103 MMOL/L (ref 95–110)
CO2 SERPL-SCNC: 20 MMOL/L (ref 23–29)
CO2 SERPL-SCNC: 21 MMOL/L (ref 23–29)
CO2 SERPL-SCNC: 21 MMOL/L (ref 23–29)
CO2 SERPL-SCNC: 25 MMOL/L (ref 23–29)
CO2 SERPL-SCNC: 25 MMOL/L (ref 23–29)
CO2 SERPL-SCNC: 26 MMOL/L (ref 23–29)
CREAT SERPL-MCNC: 1.2 MG/DL (ref 0.5–1.4)
CREAT SERPL-MCNC: 1.3 MG/DL (ref 0.5–1.4)
DIFFERENTIAL METHOD: ABNORMAL
EOSINOPHIL # BLD AUTO: 0 K/UL (ref 0–0.5)
EOSINOPHIL NFR BLD: 0.4 % (ref 0–8)
ERYTHROCYTE [DISTWIDTH] IN BLOOD BY AUTOMATED COUNT: 13.9 % (ref 11.5–14.5)
EST. GFR  (AFRICAN AMERICAN): >60 ML/MIN/1.73 M^2
EST. GFR  (NON AFRICAN AMERICAN): >60 ML/MIN/1.73 M^2
GLUCOSE SERPL-MCNC: 283 MG/DL (ref 70–110)
GLUCOSE SERPL-MCNC: 328 MG/DL (ref 70–110)
GLUCOSE SERPL-MCNC: 336 MG/DL (ref 70–110)
GLUCOSE SERPL-MCNC: 337 MG/DL (ref 70–110)
GLUCOSE SERPL-MCNC: 339 MG/DL (ref 70–110)
GLUCOSE SERPL-MCNC: 350 MG/DL (ref 70–110)
HCT VFR BLD AUTO: 33 % (ref 40–54)
HGB BLD-MCNC: 11 G/DL (ref 14–18)
IMM GRANULOCYTES # BLD AUTO: 0.05 K/UL (ref 0–0.04)
IMM GRANULOCYTES NFR BLD AUTO: 1.9 % (ref 0–0.5)
LYMPHOCYTES # BLD AUTO: 1.1 K/UL (ref 1–4.8)
LYMPHOCYTES NFR BLD: 40.1 % (ref 18–48)
MAGNESIUM SERPL-MCNC: 2 MG/DL (ref 1.6–2.6)
MCH RBC QN AUTO: 27.6 PG (ref 27–31)
MCHC RBC AUTO-ENTMCNC: 33.3 G/DL (ref 32–36)
MCV RBC AUTO: 83 FL (ref 82–98)
MONOCYTES # BLD AUTO: 0.3 K/UL (ref 0.3–1)
MONOCYTES NFR BLD: 9.4 % (ref 4–15)
NEUTROPHILS # BLD AUTO: 1.3 K/UL (ref 1.8–7.7)
NEUTROPHILS NFR BLD: 47.5 % (ref 38–73)
NRBC BLD-RTO: 0 /100 WBC
OVALOCYTES BLD QL SMEAR: ABNORMAL
PHOSPHATE SERPL-MCNC: 3.7 MG/DL (ref 2.7–4.5)
PLATELET # BLD AUTO: 177 K/UL (ref 150–350)
PMV BLD AUTO: 12 FL (ref 9.2–12.9)
POCT GLUCOSE: 225 MG/DL (ref 70–110)
POCT GLUCOSE: 259 MG/DL (ref 70–110)
POCT GLUCOSE: 275 MG/DL (ref 70–110)
POCT GLUCOSE: 287 MG/DL (ref 70–110)
POCT GLUCOSE: 288 MG/DL (ref 70–110)
POIKILOCYTOSIS BLD QL SMEAR: SLIGHT
POTASSIUM SERPL-SCNC: 3.9 MMOL/L (ref 3.5–5.1)
POTASSIUM SERPL-SCNC: 4 MMOL/L (ref 3.5–5.1)
POTASSIUM SERPL-SCNC: 4.1 MMOL/L (ref 3.5–5.1)
POTASSIUM SERPL-SCNC: 4.1 MMOL/L (ref 3.5–5.1)
POTASSIUM SERPL-SCNC: 4.4 MMOL/L (ref 3.5–5.1)
POTASSIUM SERPL-SCNC: 4.6 MMOL/L (ref 3.5–5.1)
PROT SERPL-MCNC: 6.9 G/DL (ref 6–8.4)
PROT SERPL-MCNC: 7 G/DL (ref 6–8.4)
PROT SERPL-MCNC: 7.1 G/DL (ref 6–8.4)
PROT SERPL-MCNC: 7.2 G/DL (ref 6–8.4)
PROT SERPL-MCNC: 7.4 G/DL (ref 6–8.4)
PROT SERPL-MCNC: 7.8 G/DL (ref 6–8.4)
RBC # BLD AUTO: 3.99 M/UL (ref 4.6–6.2)
SODIUM SERPL-SCNC: 133 MMOL/L (ref 136–145)
SODIUM SERPL-SCNC: 134 MMOL/L (ref 136–145)
SODIUM SERPL-SCNC: 134 MMOL/L (ref 136–145)
SODIUM SERPL-SCNC: 135 MMOL/L (ref 136–145)
SODIUM SERPL-SCNC: 135 MMOL/L (ref 136–145)
SODIUM SERPL-SCNC: 136 MMOL/L (ref 136–145)
WBC # BLD AUTO: 2.67 K/UL (ref 3.9–12.7)

## 2019-10-12 PROCEDURE — 36415 COLL VENOUS BLD VENIPUNCTURE: CPT

## 2019-10-12 PROCEDURE — 63600175 PHARM REV CODE 636 W HCPCS: Performed by: STUDENT IN AN ORGANIZED HEALTH CARE EDUCATION/TRAINING PROGRAM

## 2019-10-12 PROCEDURE — 99232 SBSQ HOSP IP/OBS MODERATE 35: CPT | Mod: ,,, | Performed by: HOSPITALIST

## 2019-10-12 PROCEDURE — 99232 SBSQ HOSP IP/OBS MODERATE 35: CPT | Mod: ,,, | Performed by: NURSE PRACTITIONER

## 2019-10-12 PROCEDURE — 25000003 PHARM REV CODE 250: Performed by: STUDENT IN AN ORGANIZED HEALTH CARE EDUCATION/TRAINING PROGRAM

## 2019-10-12 PROCEDURE — 84100 ASSAY OF PHOSPHORUS: CPT

## 2019-10-12 PROCEDURE — 83735 ASSAY OF MAGNESIUM: CPT

## 2019-10-12 PROCEDURE — 99232 PR SUBSEQUENT HOSPITAL CARE,LEVL II: ICD-10-PCS | Mod: ,,, | Performed by: HOSPITALIST

## 2019-10-12 PROCEDURE — 80053 COMPREHEN METABOLIC PANEL: CPT | Mod: 91

## 2019-10-12 PROCEDURE — 20600001 HC STEP DOWN PRIVATE ROOM

## 2019-10-12 PROCEDURE — 99232 PR SUBSEQUENT HOSPITAL CARE,LEVL II: ICD-10-PCS | Mod: ,,, | Performed by: NURSE PRACTITIONER

## 2019-10-12 PROCEDURE — 85025 COMPLETE CBC W/AUTO DIFF WBC: CPT

## 2019-10-12 RX ORDER — LANCETS 33 GAUGE
1 EACH MISCELLANEOUS 2 TIMES DAILY WITH MEALS
Qty: 100 EACH | Refills: 11 | Status: SHIPPED | OUTPATIENT
Start: 2019-10-12 | End: 2021-04-15 | Stop reason: SDUPTHER

## 2019-10-12 RX ORDER — PEN NEEDLE, DIABETIC 30 GX3/16"
1 NEEDLE, DISPOSABLE MISCELLANEOUS 2 TIMES DAILY WITH MEALS
Qty: 100 EACH | Refills: 11 | COMMUNITY
Start: 2019-10-12 | End: 2019-11-06

## 2019-10-12 RX ORDER — LANCING DEVICE
1 EACH MISCELLANEOUS 2 TIMES DAILY WITH MEALS
Qty: 1 EACH | Refills: 0 | Status: SHIPPED | OUTPATIENT
Start: 2019-10-12 | End: 2024-03-26

## 2019-10-12 RX ORDER — INSULIN PUMP SYRINGE, 3 ML
EACH MISCELLANEOUS
Qty: 1 EACH | Refills: 0 | Status: ON HOLD | OUTPATIENT
Start: 2019-10-12 | End: 2020-12-24 | Stop reason: HOSPADM

## 2019-10-12 RX ADMIN — INSULIN DETEMIR 35 UNITS: 100 INJECTION, SOLUTION SUBCUTANEOUS at 08:10

## 2019-10-12 RX ADMIN — GABAPENTIN 300 MG: 300 CAPSULE ORAL at 02:10

## 2019-10-12 RX ADMIN — INSULIN DETEMIR 35 UNITS: 100 INJECTION, SOLUTION SUBCUTANEOUS at 09:10

## 2019-10-12 RX ADMIN — HEPARIN SODIUM 7500 UNITS: 5000 INJECTION, SOLUTION INTRAVENOUS; SUBCUTANEOUS at 09:10

## 2019-10-12 RX ADMIN — INSULIN ASPART 6 UNITS: 100 INJECTION, SOLUTION INTRAVENOUS; SUBCUTANEOUS at 09:10

## 2019-10-12 RX ADMIN — LOSARTAN POTASSIUM AND HYDROCHLOROTHIAZIDE 1 TABLET: 25; 100 TABLET ORAL at 08:10

## 2019-10-12 RX ADMIN — HEPARIN SODIUM 7500 UNITS: 5000 INJECTION, SOLUTION INTRAVENOUS; SUBCUTANEOUS at 06:10

## 2019-10-12 RX ADMIN — GABAPENTIN 300 MG: 300 CAPSULE ORAL at 08:10

## 2019-10-12 RX ADMIN — HEPARIN SODIUM 7500 UNITS: 5000 INJECTION, SOLUTION INTRAVENOUS; SUBCUTANEOUS at 02:10

## 2019-10-12 RX ADMIN — INSULIN ASPART 20 UNITS: 100 INJECTION, SOLUTION INTRAVENOUS; SUBCUTANEOUS at 07:10

## 2019-10-12 RX ADMIN — GABAPENTIN 300 MG: 300 CAPSULE ORAL at 09:10

## 2019-10-12 RX ADMIN — INSULIN ASPART 6 UNITS: 100 INJECTION, SOLUTION INTRAVENOUS; SUBCUTANEOUS at 12:10

## 2019-10-12 RX ADMIN — AMLODIPINE BESYLATE 10 MG: 10 TABLET ORAL at 08:10

## 2019-10-12 RX ADMIN — INSULIN ASPART 4 UNITS: 100 INJECTION, SOLUTION INTRAVENOUS; SUBCUTANEOUS at 07:10

## 2019-10-12 RX ADMIN — INSULIN ASPART 20 UNITS: 100 INJECTION, SOLUTION INTRAVENOUS; SUBCUTANEOUS at 04:10

## 2019-10-12 RX ADMIN — INSULIN ASPART 6 UNITS: 100 INJECTION, SOLUTION INTRAVENOUS; SUBCUTANEOUS at 04:10

## 2019-10-12 RX ADMIN — SENNOSIDES 8.6 MG: 8.6 TABLET, FILM COATED ORAL at 08:10

## 2019-10-12 RX ADMIN — INSULIN ASPART 20 UNITS: 100 INJECTION, SOLUTION INTRAVENOUS; SUBCUTANEOUS at 12:10

## 2019-10-12 NOTE — ASSESSMENT & PLAN NOTE
BG goal 140-180    Plan:  Continue Levemir 35 units BID (0.8 u/kg dosing)   Novolog 20 units TID with meals   Moderate dose correction scale prn BG excursions (given insulin resistance)   BG monitoring ac/hs    ** Please call Endocrine for any BG related issues **    Discharge plan:  Would recommend patient discharge on the following:  Levemir 35 units BID  Novolog 20 units TID with meals / Novolog Correction Scale (150-200/+1)  Metformin 500mg XR pills:  1 pill once daily at night for 3-4 nights THEN  1 pill with breakfast, 1 pill with dinner for next 3-4 days THEN  1 pill with breakfast, 2 pills with dinner for next 3-4 days THEN  2 pills with breakfast, 2 pills with dinner     Pt will need insulin pen needles, glucometer and testing supplies (insurance preferred) prior to discharge. Provided patient with blood sugar logs and instructions on documenting blood sugar 4x daily. Will schedule patient for DM education and to follow up at [facility] endocrine clinic in 1-2 weeks.     Discharge Teaching:    Reviewed topics related to DM including: the need for insulin, how insulin works, what makes it a high risk medication, the importance of  follow up with endocrine provider, importance of and how to check BG, how to record BG on logs, how to administer insulin, appropriate insulin administration sites, importance of rotating injection sites, hyper/hypoglycemia, how and when to treat hypoglycemia, when to hold insulin, how the correction scale works, and when to seek medical attention.  Patient verbalized understanding, answered all questions to patient's satisfaction.

## 2019-10-12 NOTE — ASSESSMENT & PLAN NOTE
Steroid Induced diabetes   Hyperosmolar hyperglycemic state  Patient with history of pre-diabetes (A1c 5.6 in 2017) and strong diabetes family history, receiving chemotherapy with dexamethasone presenting with dizziness, polydipsia and polyuria noted to have elevated glucose levels in the ER.   Hyperglycemia 2/2 to steroid use (dexamethasone weekly), dehydration and recent URI   Blood glucose 832 on presentation, BHB 0.6 without metabolic acidosis.   Received 3 boluses of NS and was started on insulin drip    Plan:  Continue Levemir 35 units BID (0.8 u/kg dosing)   Novolog 20 units TID with meals   Moderate dose correction scale prn BG excursions (given insulin resistance)   BG monitoring ac/hs  -Provided educational materials for diabetes management   -Follow up with endocrine outpatient

## 2019-10-12 NOTE — PLAN OF CARE
Problem: Adult Inpatient Plan of Care  Goal: Plan of Care Review  Outcome: Ongoing, Progressing  POC reviewed with patient. VSS. AAOx4. Continue monitoring blood sugars after transitional insulin gtt was discontinued. Pt was provided information for diet and exercise, low carb diet plans. Pt remained free of falls. WCTM.

## 2019-10-12 NOTE — PROGRESS NOTES
[facility] Hospital Medicine  Progress Note    Patient Name: Paul Li Jr.  MRN: [id number]  Patient Class: IP- Inpatient   Admission Date: 10/10/2019  Length of Stay: 2 days  Attending Physician: Katelynn Bae MD  Primary Care Provider: Kareem Arroyo MD    [facility] Medicine Team: [facility] 3 Nataliia Neff MD    Subjective:     Principal Problem:New onset type 2 diabetes mellitus        HPI:  Paul Jones is a 43 year old male with right breast Stage 1B triple negative invasive ductal carcinoma s/p chemtherapy (last dose of taxol was on Tuesday) complicated by peripheral neuropathy, HTN, pre-diabetes who presented to the ED for dizziness. Patient states he received his last dose of chemotherapy 2 days ago, he also gets dexamethasone 10 mg with infusion. While at the office, his fasting BG was noted to be 430 and he was given insulin. Yesterday night he started to feel unwell and developed sudden onset dizziness upon standing. Prior to these, he noticed increased urination and thirst on Sunday. Patient states he had URI symptoms of cough and nasal congestion which he believes he contracted from his son. He also reports dehydration from being out of the sun and increased urination. He reports the last visit to his PCP was over a year ago and his last A1c was 5.8 in 2017 per chart review. He has a strong family history of Type 2 DM in both parents and grandmother. Denies chest pain, SOB. He admits to chronic RLE as a result to chemotherapy. An US was performed 2 months ago that was negative for DVT.    In the ED BG was noted to be at 832, he was started on insulin drip and received 3 boluses of NS.     Overview/Hospital Course:  Patient admitted for [facility] with new-onset DM. On arrival, BG noted to be in 800s. Insulin drip was initially set at a high dose while in the ED. Patient is currently being transitioned to the subcutaneous insulin with the insulin drip set at 4 units/hr per  endocrinology until BG<100. Patient continues to have elevated BG following transition to SQ insulin. Endocrine increasing insulin to get BG to goal.        Interval History: NAEO. Patient afebrile and HD. BG continues to be 250 - 300.  Review of Systems   Constitutional: Negative for chills and fever.   HENT: Negative for sore throat and trouble swallowing.    Respiratory: Negative for cough, shortness of breath and wheezing.    Cardiovascular: Negative for chest pain, palpitations and leg swelling.   Gastrointestinal: Negative for abdominal distention, abdominal pain, constipation and diarrhea.   Genitourinary: Negative for dysuria, hematuria and urgency.   Musculoskeletal: Negative for arthralgias and myalgias.   Skin: Negative for rash and wound.   Neurological: Negative for dizziness, weakness, light-headedness and headaches.   Psychiatric/Behavioral: Negative for behavioral problems and confusion.     Objective:     Vital Signs (Most Recent):  Temp: 97.9 °F (36.6 °C) (10/12/19 1547)  Pulse: 69 (10/12/19 1547)  Resp: 18 (10/12/19 1547)  BP: (!) 160/80 (10/12/19 1547)  SpO2: 97 % (10/12/19 1547) Vital Signs (24h Range):  Temp:  [97.4 °F (36.3 °C)-98.7 °F (37.1 °C)] 97.9 °F (36.6 °C)  Pulse:  [65-88] 69  Resp:  [16-18] 18  SpO2:  [93 %-100 %] 97 %  BP: (138-160)/(74-80) 160/80     Weight: (!) 186.2 kg (410 lb 7.9 oz)  Body mass index is 52.7 kg/m².    Intake/Output Summary (Last 24 hours) at 10/12/2019 1735  Last data filed at 10/12/2019 1400  Gross per 24 hour   Intake 500 ml   Output 1325 ml   Net -825 ml      Physical Exam   Constitutional: He is oriented to person, place, and time. He appears well-developed and well-nourished.   Obese   HENT:   Head: Normocephalic and atraumatic.   Eyes: EOM are normal. No scleral icterus.   Neck: Normal range of motion. Neck supple. No JVD present.   Cardiovascular: Normal rate, regular rhythm, normal heart sounds and intact distal pulses.   No murmur  heard.  Pulmonary/Chest: Effort normal and breath sounds normal. No respiratory distress. He has no wheezes. He has no rales. He exhibits no tenderness.   Abdominal: Soft. Bowel sounds are normal. He exhibits no distension. There is no tenderness.   Musculoskeletal: Normal range of motion. He exhibits edema (R) Mild pitting edema .   Neurological: He is alert and oriented to person, place, and time.   Skin: Skin is warm and dry.   Skin tags noted in posterior neck       Significant Labs:   CBC:   Recent Labs   Lab 10/11/19  0438 10/12/19  0449   WBC 2.73* 2.67*   HGB 10.2* 11.0*   HCT 30.6* 33.0*    177     CMP:   Recent Labs   Lab 10/12/19  0449 10/12/19  0940 10/12/19  1340   * 135* 135*   K 4.1 4.6 4.1    103 101   CO2 21* 21* 25   * 350* 336*   BUN 13 12 11   CREATININE 1.2 1.2 1.2   CALCIUM 9.2 8.9 9.4   PROT 6.9 7.0 7.4   ALBUMIN 3.6 3.5 3.8   BILITOT 0.3 0.4 0.5   ALKPHOS 63 64 72   AST 26 34 36   ALT 45* 47* 53*   ANIONGAP 10 11 9   EGFRNONAA >60.0 >60.0 >60.0     POCT Glucose:   Recent Labs   Lab 10/12/19  0739 10/12/19  1135 10/12/19  1611   POCTGLUCOSE 225* 259* 287*       Significant Imaging: I have reviewed all pertinent imaging results/findings within the past 24 hours.      Assessment/Plan:      * New onset type 2 diabetes mellitus  Steroid Induced diabetes   Hyperosmolar hyperglycemic state  Patient with history of pre-diabetes (A1c 5.6 in 2017) and strong diabetes family history, receiving chemotherapy with dexamethasone presenting with dizziness, polydipsia and polyuria noted to have elevated glucose levels in the ER.   Hyperglycemia 2/2 to steroid use (dexamethasone weekly), dehydration and recent URI   Blood glucose 832 on presentation, BHB 0.6 without metabolic acidosis.   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   -Continue with gabapentin      Drug induced constipation  -Scheduled senna and PRN miralax      Morbid obesity with BMI of 50.0-59.9, adult  BMI of 52. Patient is sedentary.     Plan  -Encourage weight loss and provided educational materials      Malignant neoplasm involving both nipple and areola of right breast in male, estrogen receptor negative    Patient with Stage 1B invasive ductal carcinoma in right breast diagnosed in May of 2019, s/p neoadjuvant with doxorubucin and cyclophohphamide followed by taxol (completed 12/12 treatments) with shrinkage in tumor.     -Patient is scheduled to see Dr. Whyte (breast surgeon) next week to discuss resection    Essential hypertension  Continue with amlodipine and Hyzaar        VTE Risk Mitigation (From admission, onward)         Ordered     heparin (porcine) injection 7,500 Units  Every 8 hours      10/10/19 1405     IP VTE HIGH RISK PATIENT  Once      10/10/19 0524     Place DANELLE hose  Until discontinued      10/10/19 0524                      Nataliia Neff MD  Department of Hospital Medicine   [facility]

## 2019-10-12 NOTE — PROGRESS NOTES
"[facility]  Endocrinology  Progress Note    Admit Date: 10/10/2019     Reason for Consult: Management of T2DM, Hyperglycemia     Surgical Procedure and Date: N/A    Lab Results   Component Value Date    HGBA1C 9.8 (H) 10/10/2019     Diabetes diagnosis year: new onset T2DM    Home Diabetes Medications:  None    How often checking glucose at home? not checking   BG readings on regimen: not checking  Hypoglycemia on the regimen?  No  Missed doses on regimen?  No    Diabetes Complications include:     Hyperglycemia    Complicating diabetes co morbidities:   Active Cancer and Glucocorticoid use       HPI:   Patient is a 43 y.o. male with a diagnosis of right breast Stage 1B triple negative invasive ductal carcinoma s/p chemtherapy (last dose of taxol was on Tuesday) complicated by peripheral neuropathy, HTN, pre-diabetes who presented to the ED for dizziness. Received last dose of chemotherapy 2 days ago, also gets dexamethasone 10 mg with infusion. While at the office, his fasting BG was noted to be 430  and he was given insulin. Yesterday night he started to feel unwell and developed sudden onset dizziness upon standing. Prior to these, he noticed increased urination and thirst on . He reports the last visit to his PCP was over a year ago and his last A1c was 5.8 in 2017 per chart review. He has a strong family history of Type 2 DM in both parents and grandmother. Endocrinology consulted for management of T2DM/hyperglycemia.            Interval HPI:   Overnight events: NAEON. BG remains slightly elevated but better controlled.   Eatin%  Nausea: No  Hypoglycemia and intervention: No  Fever: No  TPN and/or TF: No  If yes, type of TF/TPN and rate: none    BP (!) 142/74 (BP Location: Right arm, Patient Position: Sitting)   Pulse 73   Temp 97.4 °F (36.3 °C) (Oral)   Resp 18   Ht 6' 2" (1.88 m)   Wt (!) 186.2 kg (410 lb 7.9 oz)   SpO2 (!) 93%   BMI 52.70 kg/m²      Labs Reviewed and " Include    Recent Labs   Lab 10/12/19  0449   *   CALCIUM 9.2   ALBUMIN 3.6   PROT 6.9   *   K 4.1   CO2 21*      BUN 13   CREATININE 1.2   ALKPHOS 63   ALT 45*   AST 26   BILITOT 0.3     Lab Results   Component Value Date    WBC 2.67 (L) 10/12/2019    HGB 11.0 (L) 10/12/2019    HCT 33.0 (L) 10/12/2019    MCV 83 10/12/2019     10/12/2019     No results for input(s): TSH, FREET4 in the last 168 hours.  Lab Results   Component Value Date    HGBA1C 9.8 (H) 10/10/2019       Nutritional status:   Body mass index is 52.7 kg/m².  Lab Results   Component Value Date    ALBUMIN 3.6 10/12/2019    ALBUMIN 3.7 10/12/2019    ALBUMIN 3.5 10/11/2019     No results found for: PREALBUMIN    Estimated Creatinine Clearance: 141.9 mL/min (based on SCr of 1.2 mg/dL).    Accu-Checks  Recent Labs     10/11/19  0220 10/11/19  0650 10/11/19  0722 10/11/19  1115 10/11/19  1619 10/11/19  1942 10/11/19  2235 10/11/19  2355 10/12/19  0401 10/12/19  0739   POCTGLUCOSE 306* 233* 244* 295* 205* 237* 230* 270* 288* 225*       Current Medications and/or Treatments Impacting Glycemic Control  Immunotherapy:    Immunosuppressants     None        Steroids:   Hormones (From admission, onward)    None        Pressors:    Autonomic Drugs (From admission, onward)    None        Hyperglycemia/Diabetes Medications:   Antihyperglycemics (From admission, onward)    Start     Stop Route Frequency Ordered    10/12/19 0715  insulin aspart U-100 pen 20 Units      -- SubQ 3 times daily with meals 10/11/19 1749    10/11/19 2100  insulin detemir U-100 pen 35 Units      -- SubQ 2 times daily 10/11/19 1749    10/11/19 1229  insulin aspart U-100 pen 0-10 Units      -- SubQ As needed (PRN) 10/11/19 1129    10/10/19 1845  insulin regular (Humulin R) 100 Units in sodium chloride 0.9% 100 mL infusion      10/11 2100 IV Continuous 10/10/19 4269          ASSESSMENT and PLAN    * New onset type 2 diabetes mellitus  BG goal 140-180    Plan:  Continue  Levemir 35 units BID (0.8 u/kg dosing)   Novolog 20 units TID with meals   Moderate dose correction scale prn BG excursions (given insulin resistance)   BG monitoring ac/hs    ** Please call Endocrine for any BG related issues **    Discharge plan:  Would recommend patient discharge on the following:  Levemir 35 units BID  Novolog 20 units TID with meals / Novolog Correction Scale (150-200/+1)  Metformin 500mg XR pills:  1 pill once daily at night for 3-4 nights THEN  1 pill with breakfast, 1 pill with dinner for next 3-4 days THEN  1 pill with breakfast, 2 pills with dinner for next 3-4 days THEN  2 pills with breakfast, 2 pills with dinner     Pt will need insulin pen needles, glucometer and testing supplies (insurance preferred) prior to discharge. Provided patient with blood sugar logs and instructions on documenting blood sugar 4x daily. Will schedule patient for DM education and to [place] [facility] [place]-2 weeks.     Discharge Teaching:    Reviewed topics related to DM including: the need for insulin, how insulin works, what makes it a high risk medication, the importance of  follow up with endocrine provider, importance of and how to check BG, how to record BG on logs, how to administer insulin, appropriate insulin administration sites, importance of rotating injection sites, hyper/hypoglycemia, how and when to treat hypoglycemia, when to hold insulin, how the correction scale works, and when to seek medical attention.  Patient verbalized understanding, answered all questions to patient's satisfaction.      Malignant neoplasm involving both nipple and areola of right breast in male, estrogen receptor negative  Managed per primary team        Class 3 severe obesity due to excess calories with serious comorbidity and body mass index (BMI) of 50.0 to 59.9 in adult  Body mass index is 52.7 kg/m².   May increase insulin resistance.             Елена Dave, LAUREEN  Endocrinology  [facility]

## 2019-10-12 NOTE — SUBJECTIVE & OBJECTIVE
Interval History: NAEO. Patient afebrile and HD. BG continues to be 250 - 300.  Review of Systems   Constitutional: Negative for chills and fever.   HENT: Negative for sore throat and trouble swallowing.    Respiratory: Negative for cough, shortness of breath and wheezing.    Cardiovascular: Negative for chest pain, palpitations and leg swelling.   Gastrointestinal: Negative for abdominal distention, abdominal pain, constipation and diarrhea.   Genitourinary: Negative for dysuria, hematuria and urgency.   Musculoskeletal: Negative for arthralgias and myalgias.   Skin: Negative for rash and wound.   Neurological: Negative for dizziness, weakness, light-headedness and headaches.   Psychiatric/Behavioral: Negative for behavioral problems and confusion.     Objective:     Vital Signs (Most Recent):  Temp: 97.9 °F (36.6 °C) (10/12/19 1547)  Pulse: 69 (10/12/19 1547)  Resp: 18 (10/12/19 1547)  BP: (!) 160/80 (10/12/19 1547)  SpO2: 97 % (10/12/19 1547) Vital Signs (24h Range):  Temp:  [97.4 °F (36.3 °C)-98.7 °F (37.1 °C)] 97.9 °F (36.6 °C)  Pulse:  [65-88] 69  Resp:  [16-18] 18  SpO2:  [93 %-100 %] 97 %  BP: (138-160)/(74-80) 160/80     Weight: (!) 186.2 kg (410 lb 7.9 oz)  Body mass index is 52.7 kg/m².    Intake/Output Summary (Last 24 hours) at 10/12/2019 1735  Last data filed at 10/12/2019 1400  Gross per 24 hour   Intake 500 ml   Output 1325 ml   Net -825 ml      Physical Exam   Constitutional: He is oriented to person, place, and time. He appears well-developed and well-nourished.   Obese   HENT:   Head: Normocephalic and atraumatic.   Eyes: EOM are normal. No scleral icterus.   Neck: Normal range of motion. Neck supple. No JVD present.   Cardiovascular: Normal rate, regular rhythm, normal heart sounds and intact distal pulses.   No murmur heard.  Pulmonary/Chest: Effort normal and breath sounds normal. No respiratory distress. He has no wheezes. He has no rales. He exhibits no tenderness.   Abdominal: Soft. Bowel  sounds are normal. He exhibits no distension. There is no tenderness.   Musculoskeletal: Normal range of motion. He exhibits edema (R) Mild pitting edema .   Neurological: He is alert and oriented to person, place, and time.   Skin: Skin is warm and dry.   Skin tags noted in posterior neck       Significant Labs:   CBC:   Recent Labs   Lab 10/11/19  0438 10/12/19  0449   WBC 2.73* 2.67*   HGB 10.2* 11.0*   HCT 30.6* 33.0*    177     CMP:   Recent Labs   Lab 10/12/19  0449 10/12/19  0940 10/12/19  1340   * 135* 135*   K 4.1 4.6 4.1    103 101   CO2 21* 21* 25   * 350* 336*   BUN 13 12 11   CREATININE 1.2 1.2 1.2   CALCIUM 9.2 8.9 9.4   PROT 6.9 7.0 7.4   ALBUMIN 3.6 3.5 3.8   BILITOT 0.3 0.4 0.5   ALKPHOS 63 64 72   AST 26 34 36   ALT 45* 47* 53*   ANIONGAP 10 11 9   EGFRNONAA >60.0 >60.0 >60.0     POCT Glucose:   Recent Labs   Lab 10/12/19  0739 10/12/19  1135 10/12/19  1611   POCTGLUCOSE 225* 259* 287*       Significant Imaging: I have reviewed all pertinent imaging results/findings within the past 24 hours.

## 2019-10-12 NOTE — SUBJECTIVE & OBJECTIVE
"Interval HPI:   Overnight events: NAEON. BG remains slightly elevated but better controlled.   Eatin%  Nausea: No  Hypoglycemia and intervention: No  Fever: No  TPN and/or TF: No  If yes, type of TF/TPN and rate: none    BP (!) 142/74 (BP Location: Right arm, Patient Position: Sitting)   Pulse 73   Temp 97.4 °F (36.3 °C) (Oral)   Resp 18   Ht 6' 2" (1.88 m)   Wt (!) 186.2 kg (410 lb 7.9 oz)   SpO2 (!) 93%   BMI 52.70 kg/m²     Labs Reviewed and Include    Recent Labs   Lab 10/12/19  0449   *   CALCIUM 9.2   ALBUMIN 3.6   PROT 6.9   *   K 4.1   CO2 21*      BUN 13   CREATININE 1.2   ALKPHOS 63   ALT 45*   AST 26   BILITOT 0.3     Lab Results   Component Value Date    WBC 2.67 (L) 10/12/2019    HGB 11.0 (L) 10/12/2019    HCT 33.0 (L) 10/12/2019    MCV 83 10/12/2019     10/12/2019     No results for input(s): TSH, FREET4 in the last 168 hours.  Lab Results   Component Value Date    HGBA1C 9.8 (H) 10/10/2019       Nutritional status:   Body mass index is 52.7 kg/m².  Lab Results   Component Value Date    ALBUMIN 3.6 10/12/2019    ALBUMIN 3.7 10/12/2019    ALBUMIN 3.5 10/11/2019     No results found for: PREALBUMIN    Estimated Creatinine Clearance: 141.9 mL/min (based on SCr of 1.2 mg/dL).    Accu-Checks  Recent Labs     10/11/19  0220 10/11/19  0650 10/11/19  0722 10/11/19  1115 10/11/19  1619 10/11/19  1942 10/11/19  2235 10/11/19  2355 10/12/19  0401 10/12/19  0739   POCTGLUCOSE 306* 233* 244* 295* 205* 237* 230* 270* 288* 225*       Current Medications and/or Treatments Impacting Glycemic Control  Immunotherapy:    Immunosuppressants     None        Steroids:   Hormones (From admission, onward)    None        Pressors:    Autonomic Drugs (From admission, onward)    None        Hyperglycemia/Diabetes Medications:   Antihyperglycemics (From admission, onward)    Start     Stop Route Frequency Ordered    10/12/19 8115  insulin aspart U-100 pen 20 Units      -- SubQ 3 times daily " with meals 10/11/19 1749    10/11/19 2100  insulin detemir U-100 pen 35 Units      -- SubQ 2 times daily 10/11/19 1749    10/11/19 1229  insulin aspart U-100 pen 0-10 Units      -- SubQ As needed (PRN) 10/11/19 1129    10/10/19 1845  insulin regular (Humulin R) 100 Units in sodium chloride 0.9% 100 mL infusion      10/11 2100 IV Continuous 10/10/19 1738

## 2019-10-13 VITALS
HEART RATE: 75 BPM | RESPIRATION RATE: 18 BRPM | SYSTOLIC BLOOD PRESSURE: 152 MMHG | DIASTOLIC BLOOD PRESSURE: 71 MMHG | HEIGHT: 74 IN | TEMPERATURE: 98 F | OXYGEN SATURATION: 96 % | WEIGHT: 315 LBS | BODY MASS INDEX: 40.43 KG/M2

## 2019-10-13 LAB
ALBUMIN SERPL BCP-MCNC: 3.5 G/DL (ref 3.5–5.2)
ALBUMIN SERPL BCP-MCNC: 3.6 G/DL (ref 3.5–5.2)
ALBUMIN SERPL BCP-MCNC: 3.8 G/DL (ref 3.5–5.2)
ALP SERPL-CCNC: 60 U/L (ref 55–135)
ALP SERPL-CCNC: 64 U/L (ref 55–135)
ALP SERPL-CCNC: 67 U/L (ref 55–135)
ALT SERPL W/O P-5'-P-CCNC: 48 U/L (ref 10–44)
ALT SERPL W/O P-5'-P-CCNC: 52 U/L (ref 10–44)
ALT SERPL W/O P-5'-P-CCNC: 54 U/L (ref 10–44)
ANION GAP SERPL CALC-SCNC: 11 MMOL/L (ref 8–16)
ANION GAP SERPL CALC-SCNC: 13 MMOL/L (ref 8–16)
ANION GAP SERPL CALC-SCNC: 9 MMOL/L (ref 8–16)
ANISOCYTOSIS BLD QL SMEAR: SLIGHT
AST SERPL-CCNC: 25 U/L (ref 10–40)
AST SERPL-CCNC: 30 U/L (ref 10–40)
AST SERPL-CCNC: 32 U/L (ref 10–40)
BASOPHILS # BLD AUTO: 0.01 K/UL (ref 0–0.2)
BASOPHILS NFR BLD: 0.3 % (ref 0–1.9)
BILIRUB SERPL-MCNC: 0.3 MG/DL (ref 0.1–1)
BILIRUB SERPL-MCNC: 0.3 MG/DL (ref 0.1–1)
BILIRUB SERPL-MCNC: 0.5 MG/DL (ref 0.1–1)
BUN SERPL-MCNC: 12 MG/DL (ref 6–20)
BUN SERPL-MCNC: 13 MG/DL (ref 6–20)
BUN SERPL-MCNC: 14 MG/DL (ref 6–20)
CALCIUM SERPL-MCNC: 9.1 MG/DL (ref 8.7–10.5)
CALCIUM SERPL-MCNC: 9.1 MG/DL (ref 8.7–10.5)
CALCIUM SERPL-MCNC: 9.6 MG/DL (ref 8.7–10.5)
CHLORIDE SERPL-SCNC: 102 MMOL/L (ref 95–110)
CHLORIDE SERPL-SCNC: 103 MMOL/L (ref 95–110)
CHLORIDE SERPL-SCNC: 103 MMOL/L (ref 95–110)
CO2 SERPL-SCNC: 20 MMOL/L (ref 23–29)
CO2 SERPL-SCNC: 22 MMOL/L (ref 23–29)
CO2 SERPL-SCNC: 25 MMOL/L (ref 23–29)
CREAT SERPL-MCNC: 1.1 MG/DL (ref 0.5–1.4)
CREAT SERPL-MCNC: 1.2 MG/DL (ref 0.5–1.4)
CREAT SERPL-MCNC: 1.2 MG/DL (ref 0.5–1.4)
DIFFERENTIAL METHOD: ABNORMAL
EOSINOPHIL # BLD AUTO: 0 K/UL (ref 0–0.5)
EOSINOPHIL NFR BLD: 0.6 % (ref 0–8)
ERYTHROCYTE [DISTWIDTH] IN BLOOD BY AUTOMATED COUNT: 14 % (ref 11.5–14.5)
EST. GFR  (AFRICAN AMERICAN): >60 ML/MIN/1.73 M^2
EST. GFR  (NON AFRICAN AMERICAN): >60 ML/MIN/1.73 M^2
GLUCOSE SERPL-MCNC: 254 MG/DL (ref 70–110)
GLUCOSE SERPL-MCNC: 274 MG/DL (ref 70–110)
GLUCOSE SERPL-MCNC: 295 MG/DL (ref 70–110)
HCT VFR BLD AUTO: 32.6 % (ref 40–54)
HGB BLD-MCNC: 10.8 G/DL (ref 14–18)
IMM GRANULOCYTES # BLD AUTO: 0.03 K/UL (ref 0–0.04)
IMM GRANULOCYTES NFR BLD AUTO: 0.9 % (ref 0–0.5)
LYMPHOCYTES # BLD AUTO: 1.3 K/UL (ref 1–4.8)
LYMPHOCYTES NFR BLD: 40.1 % (ref 18–48)
MAGNESIUM SERPL-MCNC: 1.7 MG/DL (ref 1.6–2.6)
MCH RBC QN AUTO: 27.1 PG (ref 27–31)
MCHC RBC AUTO-ENTMCNC: 33.1 G/DL (ref 32–36)
MCV RBC AUTO: 82 FL (ref 82–98)
MONOCYTES # BLD AUTO: 0.3 K/UL (ref 0.3–1)
MONOCYTES NFR BLD: 9 % (ref 4–15)
NEUTROPHILS # BLD AUTO: 1.6 K/UL (ref 1.8–7.7)
NEUTROPHILS NFR BLD: 49.1 % (ref 38–73)
NRBC BLD-RTO: 0 /100 WBC
PHOSPHATE SERPL-MCNC: 4 MG/DL (ref 2.7–4.5)
PLATELET # BLD AUTO: 184 K/UL (ref 150–350)
PMV BLD AUTO: 11.5 FL (ref 9.2–12.9)
POCT GLUCOSE: 242 MG/DL (ref 70–110)
POCT GLUCOSE: 258 MG/DL (ref 70–110)
POCT GLUCOSE: 269 MG/DL (ref 70–110)
POLYCHROMASIA BLD QL SMEAR: ABNORMAL
POTASSIUM SERPL-SCNC: 3.8 MMOL/L (ref 3.5–5.1)
POTASSIUM SERPL-SCNC: 4.2 MMOL/L (ref 3.5–5.1)
POTASSIUM SERPL-SCNC: 4.3 MMOL/L (ref 3.5–5.1)
PROT SERPL-MCNC: 6.6 G/DL (ref 6–8.4)
PROT SERPL-MCNC: 7.1 G/DL (ref 6–8.4)
PROT SERPL-MCNC: 7.3 G/DL (ref 6–8.4)
RBC # BLD AUTO: 3.99 M/UL (ref 4.6–6.2)
SODIUM SERPL-SCNC: 135 MMOL/L (ref 136–145)
SODIUM SERPL-SCNC: 136 MMOL/L (ref 136–145)
SODIUM SERPL-SCNC: 137 MMOL/L (ref 136–145)
WBC # BLD AUTO: 3.32 K/UL (ref 3.9–12.7)

## 2019-10-13 PROCEDURE — 63600175 PHARM REV CODE 636 W HCPCS: Performed by: STUDENT IN AN ORGANIZED HEALTH CARE EDUCATION/TRAINING PROGRAM

## 2019-10-13 PROCEDURE — 99238 HOSP IP/OBS DSCHRG MGMT 30/<: CPT | Mod: ,,, | Performed by: HOSPITALIST

## 2019-10-13 PROCEDURE — 83735 ASSAY OF MAGNESIUM: CPT

## 2019-10-13 PROCEDURE — 25000003 PHARM REV CODE 250: Performed by: STUDENT IN AN ORGANIZED HEALTH CARE EDUCATION/TRAINING PROGRAM

## 2019-10-13 PROCEDURE — 80053 COMPREHEN METABOLIC PANEL: CPT | Mod: 91

## 2019-10-13 PROCEDURE — 36415 COLL VENOUS BLD VENIPUNCTURE: CPT

## 2019-10-13 PROCEDURE — 84100 ASSAY OF PHOSPHORUS: CPT

## 2019-10-13 PROCEDURE — 99238 PR HOSPITAL DISCHARGE DAY,<30 MIN: ICD-10-PCS | Mod: ,,, | Performed by: HOSPITALIST

## 2019-10-13 PROCEDURE — 85025 COMPLETE CBC W/AUTO DIFF WBC: CPT

## 2019-10-13 PROCEDURE — 99232 SBSQ HOSP IP/OBS MODERATE 35: CPT | Mod: ,,, | Performed by: NURSE PRACTITIONER

## 2019-10-13 PROCEDURE — 99232 PR SUBSEQUENT HOSPITAL CARE,LEVL II: ICD-10-PCS | Mod: ,,, | Performed by: NURSE PRACTITIONER

## 2019-10-13 RX ORDER — INSULIN ASPART 100 [IU]/ML
24 INJECTION, SOLUTION INTRAVENOUS; SUBCUTANEOUS 3 TIMES DAILY
Qty: 15 ML | Refills: 0 | Status: SHIPPED | OUTPATIENT
Start: 2019-10-13 | End: 2019-10-31 | Stop reason: SDUPTHER

## 2019-10-13 RX ORDER — SENNOSIDES 8.6 MG/1
1 TABLET ORAL DAILY
COMMUNITY
Start: 2019-10-14 | End: 2021-04-15 | Stop reason: ALTCHOICE

## 2019-10-13 RX ORDER — MAGNESIUM SULFATE HEPTAHYDRATE 40 MG/ML
2 INJECTION, SOLUTION INTRAVENOUS ONCE
Status: COMPLETED | OUTPATIENT
Start: 2019-10-13 | End: 2019-10-13

## 2019-10-13 RX ORDER — INSULIN ASPART 100 [IU]/ML
24 INJECTION, SOLUTION INTRAVENOUS; SUBCUTANEOUS
Status: DISCONTINUED | OUTPATIENT
Start: 2019-10-13 | End: 2019-10-13 | Stop reason: HOSPADM

## 2019-10-13 RX ADMIN — INSULIN ASPART 4 UNITS: 100 INJECTION, SOLUTION INTRAVENOUS; SUBCUTANEOUS at 11:10

## 2019-10-13 RX ADMIN — INSULIN ASPART 6 UNITS: 100 INJECTION, SOLUTION INTRAVENOUS; SUBCUTANEOUS at 02:10

## 2019-10-13 RX ADMIN — INSULIN ASPART 24 UNITS: 100 INJECTION, SOLUTION INTRAVENOUS; SUBCUTANEOUS at 11:10

## 2019-10-13 RX ADMIN — MAGNESIUM SULFATE IN WATER 2 G: 40 INJECTION, SOLUTION INTRAVENOUS at 08:10

## 2019-10-13 RX ADMIN — LOSARTAN POTASSIUM AND HYDROCHLOROTHIAZIDE 1 TABLET: 25; 100 TABLET ORAL at 08:10

## 2019-10-13 RX ADMIN — INSULIN ASPART 6 UNITS: 100 INJECTION, SOLUTION INTRAVENOUS; SUBCUTANEOUS at 07:10

## 2019-10-13 RX ADMIN — SENNOSIDES 8.6 MG: 8.6 TABLET, FILM COATED ORAL at 08:10

## 2019-10-13 RX ADMIN — GABAPENTIN 300 MG: 300 CAPSULE ORAL at 08:10

## 2019-10-13 RX ADMIN — INSULIN ASPART 20 UNITS: 100 INJECTION, SOLUTION INTRAVENOUS; SUBCUTANEOUS at 07:10

## 2019-10-13 RX ADMIN — HEPARIN SODIUM 7500 UNITS: 5000 INJECTION, SOLUTION INTRAVENOUS; SUBCUTANEOUS at 05:10

## 2019-10-13 RX ADMIN — AMLODIPINE BESYLATE 10 MG: 10 TABLET ORAL at 08:10

## 2019-10-13 NOTE — DISCHARGE SUMMARY
[facility] Hospital Medicine  Discharge Summary      Patient Name: Paul Li Jr.  MRN: [id number]  Admission Date: 10/10/2019  Hospital Length of Stay: 3 days  Discharge Date and Time:  10/13/2019 1:53 PM  Attending Physician: Katelynn Bae MD   Discharging Provider: Lola Eric MD  Primary Care Provider: Kareem Arroyo MD  [facility] Medicine Team: [facility] Lola Eric MD    HPI:   Paul Jones is a 43 year old male with right breast Stage 1B triple negative invasive ductal carcinoma s/p chemtherapy (last dose of taxol was on Tuesday) complicated by peripheral neuropathy, HTN, pre-diabetes who presented to the ED for dizziness. Patient states he received his last dose of chemotherapy 2 days ago, he also gets dexamethasone 10 mg with infusion. While at the office, his fasting BG was noted to be 430 and he was given insulin. Yesterday night he started to feel unwell and developed sudden onset dizziness upon standing. Prior to these, he noticed increased urination and thirst on Sunday. Patient states he had URI symptoms of cough and nasal congestion which he believes he contracted from his son. He also reports dehydration from being out of the sun and increased urination. He reports the last visit to his PCP was over a year ago and his last A1c was 5.8 in 2017 per chart review. He has a strong family history of Type 2 DM in both parents and grandmother. Denies chest pain, SOB. He admits to chronic RLE as a result to chemotherapy. An US was performed 2 months ago that was negative for DVT.    In the ED BG was noted to be at 832, he was started on insulin drip and received 3 boluses of NS.     * No surgery found *      Hospital Course:   Patient admitted for [facility] with new-onset DM. On arrival, BG noted to be in 800s. Insulin drip was initially set at a high dose while in the ED. Patient is currently being transitioned to the subcutaneous insulin with the insulin drip set at 4 units/hr  per endocrinology until BG<100. Patient continues to have elevated BG following transition to SQ insulin. Endocrine increasing insulin to get BG to goal. Blood glucose stable on 40U BID, Novolog 24 units with meals. Will add on metformin at home. Follow up with endocrine and primary care physician,.      Vitals:    10/13/19 1144   BP: (!) 152/71   Pulse: 75   Resp: 18   Temp: 98.1 °F (36.7 °C)     Constitutional: He is oriented to person, place, and time. He appears well-developed and well-nourished.   Obese   HENT:   Head: Normocephalic and atraumatic.   Eyes: EOM are normal. No scleral icterus.   Neck: Normal range of motion. Neck supple. No JVD present.   Cardiovascular: Normal rate, regular rhythm, normal heart sounds and intact distal pulses.   No murmur heard.  Pulmonary/Chest: Effort normal and breath sounds normal. No respiratory distress. He has no wheezes. He has no rales. He exhibits no tenderness.   Abdominal: Soft. Bowel sounds are normal. He exhibits no distension. There is no tenderness.   Musculoskeletal: Normal range of motion. He exhibits edema (R) Mild pitting edema .   Neurological: He is alert and oriented to person, place, and time.   Skin: Skin is warm and dry.   Skin tags noted in posterior neck         Consults:   Consults (From admission, onward)        Status Ordering Provider     Inpatient consult to Endocrinology  Once     Provider:  (Not yet assigned)    Completed JENNYFER WHITNEY     Inpatient consult to Registered Dietitian/Nutritionist  Once     Provider:  (Not yet assigned)    Completed JENNYFER WHITNEY          * New onset type 2 diabetes mellitus  Steroid Induced diabetes   Hyperosmolar hyperglycemic state  Patient with history of pre-diabetes (A1c 5.6 in 2017) and strong diabetes family history, receiving chemotherapy with dexamethasone presenting with dizziness, polydipsia and polyuria noted to have elevated glucose levels in the ER.   Hyperglycemia 2/2 to steroid use (dexamethasone  weekly), dehydration and recent URI   Blood glucose 832 on presentation, BHB 0.6 without metabolic acidosis.   Received 3 boluses of NS and was started on insulin drip    Plan:  Continue Levemir 40 units BID (0.8 u/kg dosing)   Novolog 24 units TID with meals   Moderate dose correction scale prn BG excursions (given insulin resistance)   BG monitoring ac/hs      Provided educational materials for diabetes management       Follow up with endocrine and PCP  outpatient           Final Active Diagnoses:    Diagnosis Date Noted POA    PRINCIPAL PROBLEM:  New onset type 2 diabetes mellitus [E11.9] 10/10/2019 Yes    Malignant neoplasm involving both nipple and areola of right breast in male, estrogen receptor negative [C50.021, Z17.1] 05/17/2019 Not Applicable    Steroid-induced diabetes mellitus [E09.9, T38.0X5A] 10/10/2019 Yes    Class 3 severe obesity due to excess calories with serious comorbidity and body mass index (BMI) of 50.0 to 59.9 in adult [E66.01, Z68.43] 10/10/2019 Not Applicable    Chemotherapy-induced neuropathy [G62.0, T45.1X5A] 08/07/2019 Yes    Drug induced constipation [K59.03] 06/10/2019 Yes    Morbid obesity with BMI of 50.0-59.9, adult [E66.01, Z68.43] 05/20/2019 Not Applicable    Gout [M10.9] 06/15/2018 Yes    Essential hypertension [I10] 11/14/2017 Yes      Problems Resolved During this Admission:    Diagnosis Date Noted Date Resolved POA    LEBRON (acute kidney injury) [N17.9] 10/10/2019 10/12/2019 Yes       Discharged Condition: stable    Disposition:     Follow Up:    Patient Instructions:      Ambulatory Referral to Diabetes Education   Standing Status: Future   Referral Priority: Routine Referral Type: Consultation   Referral Reason: Specialty Services Required   Requested Specialty: Diabetes   Number of Visits Requested: 1 Expiration Date: 10/11/20       Significant Diagnostic Studies: Labs:   CMP   Recent Labs   Lab 10/13/19  0202 10/13/19  0526 10/13/19  0932    137 135*   K  4.3 3.8 4.2    103 102   CO2 20* 25 22*   * 254* 295*   BUN 14 13 12   CREATININE 1.2 1.1 1.2   CALCIUM 9.6 9.1 9.1   PROT 7.3 6.6 7.1   ALBUMIN 3.8 3.5 3.6   BILITOT 0.3 0.3 0.5   ALKPHOS 67 60 64   AST 30 25 32   ALT 52* 48* 54*   ANIONGAP 13 9 11   ESTGFRAFRICA >60.0 >60.0 >60.0   EGFRNONAA >60.0 >60.0 >60.0    and CBC   Recent Labs   Lab 10/12/19  0449 10/13/19  0526   WBC 2.67* 3.32*   HGB 11.0* 10.8*   HCT 33.0* 32.6*    184       Pending Diagnostic Studies:     Procedure Component Value Units Date/Time    Comprehensive metabolic panel [[id number]]     Order Status:  Sent Lab Status:  No result     Specimen:  Blood          Medications:  Reconciled Home Medications:      Medication List      START taking these medications    blood sugar diagnostic Strp  Test 2 (two) times daily with meals.     blood-glucose meter kit  Use as instructed     insulin aspart U-100 100 unit/mL (3 mL) Inpn pen  Commonly known as:  NovoLOG  Inject 24 Units into the skin 3 (three) times daily.     insulin detemir U-100 100 unit/mL (3 mL) Inpn pen  Commonly known as:  LEVEMIR FLEXTOUCH  Inject 40 Units into the skin 2 (two) times daily.     lancets 33 gauge Misc  Use 2 (two) times daily with meals.     lancing device Misc  1 Device by Misc.(Non-Drug; Combo Route) route 2 (two) times daily with meals.     senna 8.6 mg tablet  Commonly known as:  SENOKOT  Take 1 tablet by mouth once daily.  Start taking on:  October 14, 2019        CONTINUE taking these medications    amLODIPine 10 MG tablet  Commonly known as:  NORVASC  TAKE 1 TABLET (10 MG) BY MOUTH EVERY DAY     gabapentin 300 MG capsule  Commonly known as:  NEURONTIN  Take 1 capsule (300 mg total) by mouth 3 (three) times daily.     lidocaine-prilocaine cream  Commonly known as:  EMLA  Apply topically to affected area 45 minutes before port access     losartan-hydrochlorothiazide 100-25 mg 100-25 mg per tablet  Commonly known as:  HYZAAR  TAKE 1 TABLET BY MOUTH  "EVERY DAY     ondansetron 8 MG Tbdl  Commonly known as:  ZOFRAN-ODT  Take 1 tablet (8 mg total) by mouth every 12 (twelve) hours as needed.     tadalafil 5 MG tablet  Commonly known as:  CIALIS  Take 2 tablets (10 mg total) by mouth daily as needed for Erectile Dysfunction.     temazepam 15 mg Cap  Commonly known as:  RESTORIL  Take 1 capsule (15 mg total) by mouth nightly as needed.        STOP taking these medications    azithromycin 250 MG tablet  Commonly known as:  Z-DAWOOD     HYDROcodone-acetaminophen 5-325 mg per tablet  Commonly known as:  NORCO        ASK your doctor about these medications    * pen needle, diabetic 32 gauge x 5/32" Ndle  1 each by Misc.(Non-Drug; Combo Route) route 2 (two) times daily with meals.  Ask about: Which instructions should I use?     * pen needle, diabetic 32 gauge x 5/32" Ndle  Commonly known as:  BD ULTRA-FINE TANESHA PEN NEEDLE  Use as directed with novolog and levemir  Ask about: Which instructions should I use?         * This list has 2 medication(s) that are the same as other medications prescribed for you. Read the directions carefully, and ask your doctor or other care provider to review them with you.                Indwelling Lines/Drains at time of discharge:   Lines/Drains/Airways     Central Venous Catheter Line                 Port A Cath Single Lumen 05/24/19 0740 142 days                Time spent on the discharge of patient: 35 minutes  Patient was seen and examined on the date of discharge and determined to be suitable for discharge.         Lola Eric MD  Department of Hospital Medicine  [facility]  "

## 2019-10-13 NOTE — PROGRESS NOTES
"[facility]  Endocrinology  Progress Note    Admit Date: 10/10/2019     Reason for Consult: Management of T2DM, Hyperglycemia     Surgical Procedure and Date: N/A    Lab Results   Component Value Date    HGBA1C 9.8 (H) 10/10/2019     Diabetes diagnosis year: new onset T2DM    Home Diabetes Medications:  None    How often checking glucose at home? not checking   BG readings on regimen: not checking  Hypoglycemia on the regimen?  No  Missed doses on regimen?  No    Diabetes Complications include:     Hyperglycemia    Complicating diabetes co morbidities:   Active Cancer and Glucocorticoid use       HPI:   Patient is a 43 y.o. male with a diagnosis of right breast Stage 1B triple negative invasive ductal carcinoma s/p chemtherapy (last dose of taxol was on Tuesday) complicated by peripheral neuropathy, HTN, pre-diabetes who presented to the ED for dizziness. Received last dose of chemotherapy 2 days ago, also gets dexamethasone 10 mg with infusion. While at the office, his fasting BG was noted to be 430  and he was given insulin. Yesterday night he started to feel unwell and developed sudden onset dizziness upon standing. Prior to these, he noticed increased urination and thirst on . He reports the last visit to his PCP was over a year ago and his last A1c was 5.8 in 2017 per chart review. He has a strong family history of Type 2 DM in both parents and grandmother. Endocrinology consulted for management of T2DM/hyperglycemia.            Interval HPI:   Overnight events: NAEON. BG remains elevated (250s) this morning despite increases in SQ insulin regimen yesterday.   Eatin%  Nausea: No  Hypoglycemia and intervention: No  Fever: No  TPN and/or TF: No  If yes, type of TF/TPN and rate: none    /80 (BP Location: Right arm, Patient Position: Sitting)   Pulse 76   Temp 98.4 °F (36.9 °C) (Oral)   Resp 18   Ht 6' 2" (1.88 m)   Wt (!) 186.2 kg (410 lb 7.9 oz)   SpO2 97%   BMI 52.70 " kg/m²      Labs Reviewed and Include    Recent Labs   Lab 10/13/19  0526   *   CALCIUM 9.1   ALBUMIN 3.5   PROT 6.6      K 3.8   CO2 25      BUN 13   CREATININE 1.1   ALKPHOS 60   ALT 48*   AST 25   BILITOT 0.3     Lab Results   Component Value Date    WBC 3.32 (L) 10/13/2019    HGB 10.8 (L) 10/13/2019    HCT 32.6 (L) 10/13/2019    MCV 82 10/13/2019     10/13/2019     No results for input(s): TSH, FREET4 in the last 168 hours.  Lab Results   Component Value Date    HGBA1C 9.8 (H) 10/10/2019       Nutritional status:   Body mass index is 52.7 kg/m².  Lab Results   Component Value Date    ALBUMIN 3.5 10/13/2019    ALBUMIN 3.8 10/13/2019    ALBUMIN 3.8 10/12/2019     No results found for: PREALBUMIN    Estimated Creatinine Clearance: 154.8 mL/min (based on SCr of 1.1 mg/dL).    Accu-Checks  Recent Labs     10/11/19  1619 10/11/19  1942 10/11/19  2235 10/11/19  2355 10/12/19  0401 10/12/19  0739 10/12/19  1135 10/12/19  1611 10/12/19  2134 10/13/19  0231   POCTGLUCOSE 205* 237* 230* 270* 288* 225* 259* 287* 275* 269*       Current Medications and/or Treatments Impacting Glycemic Control  Immunotherapy:    Immunosuppressants     None        Steroids:   Hormones (From admission, onward)    None        Pressors:    Autonomic Drugs (From admission, onward)    None        Hyperglycemia/Diabetes Medications:   Antihyperglycemics (From admission, onward)    Start     Stop Route Frequency Ordered    10/12/19 0715  insulin aspart U-100 pen 20 Units      -- SubQ 3 times daily with meals 10/11/19 1749    10/11/19 2100  insulin detemir U-100 pen 35 Units      -- SubQ 2 times daily 10/11/19 1749    10/11/19 1229  insulin aspart U-100 pen 0-10 Units      -- SubQ As needed (PRN) 10/11/19 1129    10/10/19 1845  insulin regular (Humulin R) 100 Units in sodium chloride 0.9% 100 mL infusion      10/11 2100 IV Continuous 10/10/19 1803          ASSESSMENT and PLAN    * New onset type 2 diabetes mellitus  BG goal  140-180  Fasting BG above goal ranges this morning with prandial excursions noted throughout the day yesterday.    Plan:  Increase Levemir to 40 units BID (0.9 u/kg dosing) 20% dose increase  Increase Novolog to 24 units TID with meals (20% dose increase)   Moderate dose correction scale prn BG excursions (given insulin resistance)   BG monitoring ac/hs    ** Please call Endocrine for any BG related issues **    Discharge plan:  Would recommend patient discharge on the following:  Levemir 40 units BID  Novolog 24 units TID with meals / Novolog Correction Scale (150-200/+1)  Metformin 500mg XR pills:  1 pill once daily at night for 3-4 nights THEN  1 pill with breakfast, 1 pill with dinner for next 3-4 days THEN  1 pill with breakfast, 2 pills with dinner for next 3-4 days THEN  2 pills with breakfast, 2 pills with dinner     Pt will need insulin pen needles, glucometer and testing supplies (insurance preferred) prior to discharge. Provided patient with blood sugar logs and instructions on documenting blood sugar 4x daily. Will schedule patient for DM education and to follow up at [facility] endocrine clinic in 1-2 weeks. Patient instructed to send blood glucose logs through patient portal for review on Tues 10/15 or before with any questions or concerns.    Discharge Teaching:    Reviewed topics related to DM including: the need for insulin, how insulin works, what makes it a high risk medication, the importance of  follow up with endocrine provider, importance of and how to check BG, how to record BG on logs, how to administer insulin, appropriate insulin administration sites, importance of rotating injection sites, hyper/hypoglycemia, how and when to treat hypoglycemia, when to hold insulin, how the correction scale works, and when to seek medical attention.  Patient verbalized understanding, answered all questions to patient's satisfaction.      Malignant neoplasm involving both nipple and areola of right breast in  male, estrogen receptor negative  Managed per primary team        Class 3 severe obesity due to excess calories with serious comorbidity and body mass index (BMI) of 50.0 to 59.9 in adult  Body mass index is 52.7 kg/m².   May increase insulin resistance.             Елена Dave, LAUREEN  Endocrinology  [facility]

## 2019-10-13 NOTE — SUBJECTIVE & OBJECTIVE
"Interval HPI:   Overnight events: NAEON. BG remains elevated (250s) this morning despite increases in SQ insulin regimen yesterday.   Eatin%  Nausea: No  Hypoglycemia and intervention: No  Fever: No  TPN and/or TF: No  If yes, type of TF/TPN and rate: none    /80 (BP Location: Right arm, Patient Position: Sitting)   Pulse 76   Temp 98.4 °F (36.9 °C) (Oral)   Resp 18   Ht 6' 2" (1.88 m)   Wt (!) 186.2 kg (410 lb 7.9 oz)   SpO2 97%   BMI 52.70 kg/m²     Labs Reviewed and Include    Recent Labs   Lab 10/13/19  0526   *   CALCIUM 9.1   ALBUMIN 3.5   PROT 6.6      K 3.8   CO2 25      BUN 13   CREATININE 1.1   ALKPHOS 60   ALT 48*   AST 25   BILITOT 0.3     Lab Results   Component Value Date    WBC 3.32 (L) 10/13/2019    HGB 10.8 (L) 10/13/2019    HCT 32.6 (L) 10/13/2019    MCV 82 10/13/2019     10/13/2019     No results for input(s): TSH, FREET4 in the last 168 hours.  Lab Results   Component Value Date    HGBA1C 9.8 (H) 10/10/2019       Nutritional status:   Body mass index is 52.7 kg/m².  Lab Results   Component Value Date    ALBUMIN 3.5 10/13/2019    ALBUMIN 3.8 10/13/2019    ALBUMIN 3.8 10/12/2019     No results found for: PREALBUMIN    Estimated Creatinine Clearance: 154.8 mL/min (based on SCr of 1.1 mg/dL).    Accu-Checks  Recent Labs     10/11/19  1619 10/11/19  1942 10/11/19  2235 10/11/19  2355 10/12/19  0401 10/12/19  0739 10/12/19  1135 10/12/19  1611 10/12/19  2134 10/13/19  0231   POCTGLUCOSE 205* 237* 230* 270* 288* 225* 259* 287* 275* 269*       Current Medications and/or Treatments Impacting Glycemic Control  Immunotherapy:    Immunosuppressants     None        Steroids:   Hormones (From admission, onward)    None        Pressors:    Autonomic Drugs (From admission, onward)    None        Hyperglycemia/Diabetes Medications:   Antihyperglycemics (From admission, onward)    Start     Stop Route Frequency Ordered    10/12/19 6720  insulin aspart U-100 pen 20 " Units      -- SubQ 3 times daily with meals 10/11/19 1749    10/11/19 2100  insulin detemir U-100 pen 35 Units      -- SubQ 2 times daily 10/11/19 1749    10/11/19 1229  insulin aspart U-100 pen 0-10 Units      -- SubQ As needed (PRN) 10/11/19 1129    10/10/19 1845  insulin regular (Humulin R) 100 Units in sodium chloride 0.9% 100 mL infusion      10/11 2100 IV Continuous 10/10/19 173

## 2019-10-13 NOTE — ASSESSMENT & PLAN NOTE
BG goal 140-180  Fasting BG above goal ranges this morning with prandial excursions noted throughout the day yesterday.    Plan:  Increase Levemir to 40 units BID (0.9 u/kg dosing) 20% dose increase  Increase Novolog to 24 units TID with meals (20% dose increase)   Moderate dose correction scale prn BG excursions (given insulin resistance)   BG monitoring ac/hs    ** Please call Endocrine for any BG related issues **    Discharge plan:  Would recommend patient discharge on the following:  Levemir 40 units BID  Novolog 24 units TID with meals / Novolog Correction Scale (150-200/+1)  Metformin 500mg XR pills:  1 pill once daily at night for 3-4 nights THEN  1 pill with breakfast, 1 pill with dinner for next 3-4 days THEN  1 pill with breakfast, 2 pills with dinner for next 3-4 days THEN  2 pills with breakfast, 2 pills with dinner     Pt will need insulin pen needles, glucometer and testing supplies (insurance preferred) prior to discharge. Provided patient with blood sugar logs and instructions on documenting blood sugar 4x daily. Will schedule patient for DM education and to follow up at [facility] endocrine clinic in 1-2 weeks. Patient instructed to send blood glucose logs through patient portal for review on Tues 10/15 or before with any questions or concerns.    Discharge Teaching:    Reviewed topics related to DM including: the need for insulin, how insulin works, what makes it a high risk medication, the importance of  follow up with endocrine provider, importance of and how to check BG, how to record BG on logs, how to administer insulin, appropriate insulin administration sites, importance of rotating injection sites, hyper/hypoglycemia, how and when to treat hypoglycemia, when to hold insulin, how the correction scale works, and when to seek medical attention.  Patient verbalized understanding, answered all questions to patient's satisfaction.

## 2019-10-13 NOTE — PLAN OF CARE
1201-Pt Aax4, breathing even and unlabored, call light in reach, bed in lowest position, wife at bedside throughout shift. Administered medications per orders, pt tolerated well. Continued diabetes education with pt self administration of insulin. D/C orders placed, will review and set up diabetic device once delivered to bedside. Will continue to monitor.    1502-D/C orders reviewed and diabetic supplies explained and set up by RN. Pt and wife stated understanding. D/C PIV, pt tolerated well.

## 2019-10-13 NOTE — ASSESSMENT & PLAN NOTE
Steroid Induced diabetes   Hyperosmolar hyperglycemic state  Patient with history of pre-diabetes (A1c 5.6 in 2017) and strong diabetes family history, receiving chemotherapy with dexamethasone presenting with dizziness, polydipsia and polyuria noted to have elevated glucose levels in the ER.   Hyperglycemia 2/2 to steroid use (dexamethasone weekly), dehydration and recent URI   Blood glucose 832 on presentation, BHB 0.6 without metabolic acidosis.   Received 3 boluses of NS and was started on insulin drip    Plan:  Continue Levemir 40 units BID (0.8 u/kg dosing)   Novolog 24 units TID with meals   Moderate dose correction scale prn BG excursions (given insulin resistance)   BG monitoring ac/hs      Provided educational materials for diabetes management       Follow up with endocrine and PCP  outpatient

## 2019-10-13 NOTE — PLAN OF CARE
Pt slept throughout evening.  Pt's evening , 0200 , covered per sliding scale.  Pt educated about self administering insulin.  Pt educated about appropriate bed time snacks.      Pt SR on telemetry.    Pt ambulating independently, pt continent.  Will continue to monitor.

## 2019-10-14 NOTE — PLAN OF CARE
Pt discharged home with family.  Ambulatory referral placed by MD for endocrine follow up.    Future Appointments   Date Time Provider Department Center   10/17/2019  2:45 PM Shima Whyte MD [facility] [place]   10/29/2019  9:10 AM LAB, HEMONC CANCER BLD[facility] LAB [facility] [place]   10/29/2019 10:00 AM Richa Bahena MD [facility] HEM ONC [place]          10/14/19 0836   Final Note   Assessment Type Final Discharge Note   Anticipated Discharge Disposition Home   Hospital Follow Up  Appt(s) scheduled? Yes   Discharge plans and expectations educations in teach back method with documentation complete? Yes     Julie Haase RN  Case Management [phone]

## 2019-10-15 ENCOUNTER — PATIENT OUTREACH (OUTPATIENT)
Dept: ADMINISTRATIVE | Facility: OTHER | Age: 44
End: 2019-10-15

## 2019-10-17 ENCOUNTER — OFFICE VISIT (OUTPATIENT)
Dept: SURGERY | Facility: CLINIC | Age: 44
End: 2019-10-17
Payer: MEDICAID

## 2019-10-17 VITALS
HEART RATE: 85 BPM | HEIGHT: 74 IN | WEIGHT: 315 LBS | DIASTOLIC BLOOD PRESSURE: 75 MMHG | BODY MASS INDEX: 40.43 KG/M2 | SYSTOLIC BLOOD PRESSURE: 135 MMHG

## 2019-10-17 DIAGNOSIS — Z01.818 PRE-OP TESTING: ICD-10-CM

## 2019-10-17 DIAGNOSIS — C50.921 MALIGNANT NEOPLASM OF RIGHT MALE BREAST, UNSPECIFIED ESTROGEN RECEPTOR STATUS, UNSPECIFIED SITE OF BREAST: Primary | ICD-10-CM

## 2019-10-17 PROCEDURE — 99215 OFFICE O/P EST HI 40 MIN: CPT | Mod: S$PBB,,, | Performed by: SURGERY

## 2019-10-17 PROCEDURE — 99999 PR PBB SHADOW E&M-EST. PATIENT-LVL IV: CPT | Mod: PBBFAC,,, | Performed by: SURGERY

## 2019-10-17 PROCEDURE — 99999 PR PBB SHADOW E&M-EST. PATIENT-LVL IV: ICD-10-PCS | Mod: PBBFAC,,, | Performed by: SURGERY

## 2019-10-17 PROCEDURE — 99214 OFFICE O/P EST MOD 30 MIN: CPT | Mod: PBBFAC | Performed by: SURGERY

## 2019-10-17 PROCEDURE — 99215 PR OFFICE/OUTPT VISIT, EST, LEVL V, 40-54 MIN: ICD-10-PCS | Mod: S$PBB,,, | Performed by: SURGERY

## 2019-10-18 ENCOUNTER — TELEPHONE (OUTPATIENT)
Dept: FAMILY MEDICINE | Facility: CLINIC | Age: 44
End: 2019-10-18

## 2019-10-18 ENCOUNTER — TELEPHONE (OUTPATIENT)
Dept: UROLOGY | Facility: CLINIC | Age: 44
End: 2019-10-18

## 2019-10-18 NOTE — TELEPHONE ENCOUNTER
----- Message from Kelly Stevens sent at 10/18/2019  3:39 PM CDT -----  Contact: patient  Type:  Sooner Apoointment Request    Name of Caller:  Patient  When is the first available appointment?  n/a  Symptoms:  Hospital follow up  Best Call Back Number:  [phone]  Additional Information:

## 2019-10-18 NOTE — TELEPHONE ENCOUNTER
----- Message from Kelly Stevens sent at 10/18/2019  3:42 PM CDT -----  Contact: patient  Type:  Sooner Apoointment Request     Name of Caller:  Patient   When is the first available appointment?  n/a   Symptoms:  f/u, drain put in   Best Call Back Number:  [phone]   Additional Information:

## 2019-10-20 NOTE — PROGRESS NOTES
Subjective:      Patient ID: Paul Li Jr. is a 44 y.o. male.    Chief Complaint:    No chief complaint on file.    History of Present Illness  This is a follow-up visit after recent hospitalization          Endocrinology was consulted for management of hyperglycemia during previous [facility] Admission. Consult was documented as follows:    Admit Date: 10/10/2019      Reason for Consult: Management of T2DM, Hyperglycemia      Surgical Procedure and Date: N/A           Lab Results   Component Value Date     HGBA1C 9.8 (H) 10/10/2019      Diabetes diagnosis year: new onset T2DM     Home Diabetes Medications:  None     How often checking glucose at home? not checking   BG readings on regimen: not checking  Hypoglycemia on the regimen?  No  Missed doses on regimen?  No     Diabetes Complications include:     Hyperglycemia     Complicating diabetes co morbidities:   Active Cancer and Glucocorticoid use      HPI:   Patient is a 43 y.o. male with a diagnosis of right breast Stage 1B triple negative invasive ductal carcinoma s/p chemtherapy (last dose of taxol was on Tuesday) complicated by peripheral neuropathy, HTN, pre-diabetes who presented to the ED for dizziness. Received last dose of chemotherapy 2 days ago, also gets dexamethasone 10 mg with infusion. While at the office, his fasting BG was noted to be 430  and he was given insulin. Yesterday night he started to feel unwell and developed sudden onset dizziness upon standing. Prior to these, he noticed increased urination and thirst on Sunday. He reports the last visit to his PCP was over a year ago and his last A1c was 5.8 in 2017 per chart review. He has a strong family history of Type 2 DM in both parents and grandmother. Endocrinology consulted for management of T2DM/hyperglycemia.                          Discharge Follow-up Clinic Visit      Etiology of the hyperglycemia:  T2DM, steroid induced hyperglycemia.     Discharge Date:  Admission Date:  10/10/2019  Hospital Length of Stay: 3 days  Discharge Date and Time:  10/13/2019 1:53 PM  Attending Physician: Katelynn Bae MD   Discharging Provider: Lola Eric MD  Primary Care Provider: Kareem Arroyo MD  Hospital Medicine Team: [facility] HOSP MED 3 Lola Eric MD    Discharge DM Regimen:  Was able to fill prescription for medications and supplies. WAS NOT ABLE TO GET METFORMIN FILLED IMMEDIATLY POST DISCHARGE  Levemir 40 units BID  Novolog 24 units TID with meals / Novolog Correction Scale (150-200/+1)  Metformin 500mg XR pills:  1 pill once daily at night for 3-4 nights THEN  1 pill with breakfast, 1 pill with dinner for next 3-4 days THEN  1 pill with breakfast, 2 pills with dinner for next 3-4 days THEN  2 pills with breakfast, 2 pills with dinner        Compliant with SQ insulin.     Prior medications not tolerated or Failed treatments:    None prior to diagnosis.     # times a day testing  4 times per day.     Log reviewed: yes - see picture         Hypoglycemic event at home? None     Typical meals at home:   Breakfast - Eggs/Egg sandwich (premaid meals).   Lunch - [place], Salad, chips.   Dinner - Read Beans   Snacks - yes. Will drink soft drinks.         DM Education - last visit:   Will need to be scheduled.     With regards to reason for admit:  Doing better       Review of Systems   Constitutional: Positive for appetite change.   Gastrointestinal: Negative for nausea.   Endocrine: Negative for cold intolerance, heat intolerance, polydipsia and polyphagia.   All other systems reviewed and are negative.  Endocrine: No Polyuria polydipsia nocturia or vision changes    Objective:   Physical Exam   Constitutional: He is oriented to person, place, and time.   Pulmonary/Chest: Effort normal and breath sounds normal.   Neurological: He is alert and oriented to person, place, and time.   Skin:   Injection sites are ok. No lipo hypertrophy or atrophy noted.   Psychiatric: He has a normal mood and affect.    Vitals reviewed.    Body mass index is 52.93 kg/m².    Lab Review:   Lab Results   Component Value Date    HGBA1C 9.8 (H) 10/10/2019     Lab Results   Component Value Date    CHOL 148 06/15/2018    HDL 32 (L) 06/15/2018    LDLCALC 101.0 06/15/2018    TRIG 75 06/15/2018    CHOLHDL 21.6 06/15/2018     Lab Results   Component Value Date     (L) 10/13/2019    K 4.2 10/13/2019     10/13/2019    CO2 22 (L) 10/13/2019     (H) 10/13/2019    BUN 12 10/13/2019    CREATININE 1.2 10/13/2019    CALCIUM 9.1 10/13/2019    PROT 7.1 10/13/2019    ALBUMIN 3.6 10/13/2019    BILITOT 0.5 10/13/2019    ALKPHOS 64 10/13/2019    AST 32 10/13/2019    ALT 54 (H) 10/13/2019    ANIONGAP 11 10/13/2019    ESTGFRAFRICA >60.0 10/13/2019    EGFRNONAA >60.0 10/13/2019    TSH 0.898 06/15/2018       Assessment and Plan   Reviewed goals of therapy are to get the best control we can without hypoglycemia    DM education has already been scheduled for 10/24/2019    Medication changes:     Continue:    Levemir 40 BID   Novolog 24 units TIDWM    Start:     Metformin 500mg xr pills:  1 pill every morning for 1 week   THEN  1 pill with breakfast, 1 pill with dinner for week 2   THEN  1 pill with breakfast, 2 pills with dinner for week 3   THEN  2 pills with breakfast, 2 pills with dinner       Reviewed patient's current insulin regimen. Clarified proper insulin dose and timing in relation to meals, etc. Insulin injection sites and proper rotation instructed.       -Advised frequent self blood glucose monitoring.  Patient encouraged to document glucose results and bring them to every clinic visit      -Hypoglycemia precautions discussed. Instructed on precautions before driving.      -Close adherence to lifestyle changes recommended.        After discussing evaluation results, patient agrees with proposed plan of care, has no further concerns, and agrees to follow-up with [facility] as needed for any worsening condition and/or  symptoms associated with DM.             Problem List Items Addressed This Visit        Oncology    Microcytic anemia    Overview     - Microcytic anemia present since atleast 2017 without significant change.  - Will test with hemoglobin electrophoresis in future.         Current Assessment & Plan     May affect accuracy of HbA1c results.               Endocrine    Morbid obesity with BMI of 50.0-59.9, adult    Overview     - Discussed weight loss         Current Assessment & Plan     Body mass index is 52.93 kg/m².  may contribute to insulin resistance           New onset type 2 diabetes mellitus    Current Assessment & Plan     Metformin 500mg xr pills:  1 pill every morning for 1 week   THEN  1 pill with breakfast, 1 pill with dinner for week 2   THEN  1 pill with breakfast, 2 pills with dinner for week 3   THEN  2 pills with breakfast, 2 pills with dinner     Levemir 40 units BID  Novolog 16 units TIDWM with following correction scale:     150 - 200 + 1 unit  201 - 250 + 2 units  251 - 300 + 3 units  301 - 350 + 4 units   > 350   + 5 units             Relevant Medications    metFORMIN (GLUCOPHAGE-XR) 500 MG 24 hr tablet

## 2019-10-21 ENCOUNTER — OFFICE VISIT (OUTPATIENT)
Dept: ENDOCRINOLOGY | Facility: CLINIC | Age: 44
End: 2019-10-21
Payer: MEDICAID

## 2019-10-21 VITALS
WEIGHT: 315 LBS | HEIGHT: 74 IN | DIASTOLIC BLOOD PRESSURE: 75 MMHG | BODY MASS INDEX: 40.43 KG/M2 | SYSTOLIC BLOOD PRESSURE: 135 MMHG | HEART RATE: 84 BPM | RESPIRATION RATE: 20 BRPM

## 2019-10-21 DIAGNOSIS — D50.9 MICROCYTIC ANEMIA: ICD-10-CM

## 2019-10-21 DIAGNOSIS — E66.01 MORBID OBESITY WITH BMI OF 50.0-59.9, ADULT: ICD-10-CM

## 2019-10-21 DIAGNOSIS — E11.9 NEW ONSET TYPE 2 DIABETES MELLITUS: ICD-10-CM

## 2019-10-21 PROCEDURE — 99214 OFFICE O/P EST MOD 30 MIN: CPT | Mod: S$PBB,,, | Performed by: INTERNAL MEDICINE

## 2019-10-21 PROCEDURE — 99214 PR OFFICE/OUTPT VISIT, EST, LEVL IV, 30-39 MIN: ICD-10-PCS | Mod: S$PBB,,, | Performed by: INTERNAL MEDICINE

## 2019-10-21 PROCEDURE — 99213 OFFICE O/P EST LOW 20 MIN: CPT | Mod: PBBFAC

## 2019-10-21 PROCEDURE — 99999 PR PBB SHADOW E&M-EST. PATIENT-LVL III: ICD-10-PCS | Mod: PBBFAC,,,

## 2019-10-21 PROCEDURE — 99999 PR PBB SHADOW E&M-EST. PATIENT-LVL III: CPT | Mod: PBBFAC,,,

## 2019-10-21 RX ORDER — METFORMIN HYDROCHLORIDE 500 MG/1
1000 TABLET, EXTENDED RELEASE ORAL
Qty: 180 TABLET | Refills: 3 | Status: SHIPPED | OUTPATIENT
Start: 2019-10-21 | End: 2020-02-21

## 2019-10-21 RX ORDER — LANCETS 33 GAUGE
EACH MISCELLANEOUS
Qty: 200 EACH | Refills: 11 | Status: ON HOLD | OUTPATIENT
Start: 2019-10-21 | End: 2020-12-24 | Stop reason: HOSPADM

## 2019-10-21 NOTE — PATIENT INSTRUCTIONS
Start Metformin taper.     Continue to keep BG logs and send to [facility] Endocrinology clinic.

## 2019-10-21 NOTE — ASSESSMENT & PLAN NOTE
Metformin 500mg xr pills:  1 pill every morning for 1 week   THEN  1 pill with breakfast, 1 pill with dinner for week 2   THEN  1 pill with breakfast, 2 pills with dinner for week 3   THEN  2 pills with breakfast, 2 pills with dinner     Levemir 40 units BID  Novolog 16 units TIDWM with following correction scale:     150 - 200 + 1 unit  201 - 250 + 2 units  251 - 300 + 3 units  301 - 350 + 4 units   > 350   + 5 units

## 2019-10-22 ENCOUNTER — PATIENT OUTREACH (OUTPATIENT)
Dept: ADMINISTRATIVE | Facility: OTHER | Age: 44
End: 2019-10-22

## 2019-10-23 ENCOUNTER — TELEPHONE (OUTPATIENT)
Dept: REHABILITATION | Facility: HOSPITAL | Age: 44
End: 2019-10-23

## 2019-10-23 ENCOUNTER — OFFICE VISIT (OUTPATIENT)
Dept: FAMILY MEDICINE | Facility: CLINIC | Age: 44
End: 2019-10-23
Payer: MEDICAID

## 2019-10-23 VITALS
HEIGHT: 74 IN | OXYGEN SATURATION: 97 % | TEMPERATURE: 99 F | WEIGHT: 315 LBS | DIASTOLIC BLOOD PRESSURE: 82 MMHG | BODY MASS INDEX: 40.43 KG/M2 | HEART RATE: 70 BPM | SYSTOLIC BLOOD PRESSURE: 124 MMHG

## 2019-10-23 DIAGNOSIS — T45.1X5A CHEMOTHERAPY-INDUCED NEUROPATHY: ICD-10-CM

## 2019-10-23 DIAGNOSIS — E66.01 CLASS 3 SEVERE OBESITY DUE TO EXCESS CALORIES WITH SERIOUS COMORBIDITY AND BODY MASS INDEX (BMI) OF 50.0 TO 59.9 IN ADULT: ICD-10-CM

## 2019-10-23 DIAGNOSIS — G62.0 CHEMOTHERAPY-INDUCED NEUROPATHY: ICD-10-CM

## 2019-10-23 DIAGNOSIS — I10 ESSENTIAL HYPERTENSION: ICD-10-CM

## 2019-10-23 DIAGNOSIS — E11.9 NEW ONSET TYPE 2 DIABETES MELLITUS: Primary | ICD-10-CM

## 2019-10-23 DIAGNOSIS — C50.021 MALIGNANT NEOPLASM INVOLVING BOTH NIPPLE AND AREOLA OF RIGHT BREAST IN MALE, ESTROGEN RECEPTOR NEGATIVE: ICD-10-CM

## 2019-10-23 DIAGNOSIS — Z17.1 MALIGNANT NEOPLASM INVOLVING BOTH NIPPLE AND AREOLA OF RIGHT BREAST IN MALE, ESTROGEN RECEPTOR NEGATIVE: ICD-10-CM

## 2019-10-23 PROCEDURE — 99999 PR PBB SHADOW E&M-EST. PATIENT-LVL V: ICD-10-PCS | Mod: PBBFAC,,, | Performed by: FAMILY MEDICINE

## 2019-10-23 PROCEDURE — 99214 PR OFFICE/OUTPT VISIT, EST, LEVL IV, 30-39 MIN: ICD-10-PCS | Mod: S$PBB,,, | Performed by: FAMILY MEDICINE

## 2019-10-23 PROCEDURE — 99215 OFFICE O/P EST HI 40 MIN: CPT | Mod: PBBFAC,PO | Performed by: FAMILY MEDICINE

## 2019-10-23 PROCEDURE — 99999 PR PBB SHADOW E&M-EST. PATIENT-LVL V: CPT | Mod: PBBFAC,,, | Performed by: FAMILY MEDICINE

## 2019-10-23 PROCEDURE — 99214 OFFICE O/P EST MOD 30 MIN: CPT | Mod: S$PBB,,, | Performed by: FAMILY MEDICINE

## 2019-10-23 RX ORDER — GABAPENTIN 300 MG/1
300 CAPSULE ORAL 3 TIMES DAILY
Qty: 90 CAPSULE | Refills: 11 | Status: SHIPPED | OUTPATIENT
Start: 2019-10-23 | End: 2020-11-17 | Stop reason: SDUPTHER

## 2019-10-23 NOTE — PROGRESS NOTES
Subjective:       Patient ID: Paul Li Jr. is a 44 y.o. male.    Chief Complaint: Hospital Follow Up (hyperglycemia)    Here for hospital follow-up.    Admission Date: 10/10/2019  Hospital Length of Stay: 3 days  Discharge Date and Time:  10/13/2019 1:53 PM  Attending Physician: Katelynn Bae MD   Discharging Provider: Lola Eric MD  Primary Care Provider: Kareem Arroyo MD  [facility] Medicine Team: [facility] MED 3 Lola Eric MD  HPI:   Paul Jones is a 43 year old male with right breast Stage 1B triple negative invasive ductal carcinoma s/p chemtherapy (last dose of taxol was on Tuesday) complicated by peripheral neuropathy, HTN, pre-diabetes who presented to the ED for dizziness. Patient states he received his last dose of chemotherapy 2 days ago, he also gets dexamethasone 10 mg with infusion. While at the office, his fasting BG was noted to be 430 and he was given insulin. Yesterday night he started to feel unwell and developed sudden onset dizziness upon standing. Prior to these, he noticed increased urination and thirst on Sunday. Patient states he had URI symptoms of cough and nasal congestion which he believes he contracted from his son. He also reports dehydration from being out of the sun and increased urination. He reports the last visit to his PCP was over a year ago and his last A1c was 5.8 in 2017 per chart review. He has a strong family history of Type 2 DM in both parents and grandmother. Denies chest pain, SOB. He admits to chronic RLE as a result to chemotherapy. An US was performed 2 months ago that was negative for DVT.  In the ED BG was noted to be at 832, he was started on insulin drip and received 3 boluses of NS.   * No surgery found *    Hospital Course:   Patient admitted for [facility] with new-onset DM. On arrival, BG noted to be in 800s. Insulin drip was initially set at a high dose while in the ED. Patient is currently being transitioned to the subcutaneous insulin with the  insulin drip set at 4 units/hr per endocrinology until BG<100. Patient continues to have elevated BG following transition to SQ insulin. Endocrine increasing insulin to get BG to goal. Blood glucose stable on 40U BID, Novolog 24 units with meals. Will add on metformin at home. Follow up with endocrine and primary care physician,.    New onset diabetes - novolog 24 units TID with sliding, Levimir 40 units BID, metformin XR 500mg 2 tabs QAM and titrating this up; following with endocrinology and needs to be established on the [facility]  BGs 118-140  HTN - taking norvasc 10mg daily, losartan /25 dfaily  Chemo-induced neuropathy - taking gabapentin 300mg TID  Breast cancer - following with Dr. Whyte    Living with family on [facility] presently      Past Medical History:   Diagnosis Date    HTN (hypertension)     Leg edema, right     Prediabetes     Therapy     marital counseling       Past Surgical History:   Procedure Laterality Date    INSERTION OF TUNNELED CENTRAL VENOUS CATHETER (CVC) WITH SUBCUTANEOUS PORT Left 2019    Procedure: [id number];  Surgeon: Shima Whyte MD;  Location: [facility] OR [address] [place] [address];  Service: General;  Laterality: Left;       Review of patient's allergies indicates:  No Known Allergies    Social History     Socioeconomic History    Marital status:      Spouse name: Not on file    Number of children: 3    Years of education: Not on file    Highest education level: Not on file   Occupational History    Occupation:    Social Needs    Financial resource strain: Not very hard    Food insecurity:     Worry: Never true     Inability: Never true    Transportation needs:     Medical: Yes     Non-medical: No   Tobacco Use    Smoking status: Former Smoker     Packs/day: 0.25     Years: 15.00     Pack years: 3.75     Types: Cigarettes     Last attempt to quit: 2013     Years since quittin.9    Smokeless tobacco: Never Used    Tobacco comment:  Social smoker with alcohol    Substance and Sexual Activity    Alcohol use: Yes     Alcohol/week: 0.0 standard drinks     Frequency: Monthly or less     Drinks per session: 1 or 2     Binge frequency: Never     Comment: Rarely    Drug use: Never    Sexual activity: Yes     Partners: Female     Birth control/protection: None   Lifestyle    Physical activity:     Days per week: 0 days     Minutes per session: Not on file    Stress: Only a little   Relationships    Social connections:     Talks on phone: More than three times a week     Gets together: Patient refused     Attends Protestant service: Not on file     Active member of club or organization: No     Attends meetings of clubs or organizations: Never     Relationship status:    Other Topics Concern    Patient feels they ought to cut down on drinking/drug use Not Asked    Patient annoyed by others criticizing their drinking/drug use Not Asked    Patient has felt bad or guilty about drinking/drug use Not Asked    Patient has had a drink/used drugs as an eye opener in the AM Not Asked   Social History Narrative    From [place]    Lives with wife    3 sons in college    Wife recent loss of 6 month pregnancy (May 1, 2019)       Current Outpatient Medications on File Prior to Visit   Medication Sig Dispense Refill    amLODIPine (NORVASC) 10 MG tablet TAKE 1 TABLET (10 MG) BY MOUTH EVERY DAY 30 tablet 10    blood sugar diagnostic Strp Test 2 (two) times daily with meals. 100 strip 11    blood sugar diagnostic Strp To check BG 4 times daily, to use with insurance preferred meter 200 each 11    blood sugar diagnostic Strp Use to test BG 4 times a day (one Touch Viero) 200 strip 3    blood-glucose meter kit Use as instructed 1 each 0    insulin aspart U-100 (NOVOLOG) 100 unit/mL (3 mL) InPn pen Inject 24 Units into the skin 3 (three) times daily. 15 mL 0    insulin detemir U-100 (LEVEMIR FLEXTOUCH) 100 unit/mL (3 mL) SubQ InPn pen Inject 40  "Units into the skin 2 (two) times daily. 15 mL 0    lancets 33 gauge Misc Use 2 (two) times daily with meals. 100 each 11    lancets 33 gauge Misc To check BG 4 times daily, to use with insurance preferred meter 200 each 11    lancing device Misc 1 Device by Misc.(Non-Drug; Combo Route) route 2 (two) times daily with meals. 1 each 0    lidocaine-prilocaine (EMLA) cream Apply topically to affected area 45 minutes before port access 30 g 1    losartan-hydrochlorothiazide 100-25 mg (HYZAAR) 100-25 mg per tablet TAKE 1 TABLET BY MOUTH EVERY DAY 90 tablet 0    metFORMIN (GLUCOPHAGE-XR) 500 MG 24 hr tablet Take 2 tablets (1,000 mg total) by mouth daily with breakfast. 180 tablet 3    ondansetron (ZOFRAN-ODT) 8 MG TbDL Take 1 tablet (8 mg total) by mouth every 12 (twelve) hours as needed. 20 tablet 1    pen needle, diabetic (BD ULTRA-FINE TANESHA PEN NEEDLE) 32 gauge x 5/32" Ndle Use as directed with novolog and levemir 100 each 0    pen needle, diabetic 32 gauge x 5/32" Ndle 1 each by Misc.(Non-Drug; Combo Route) route 2 (two) times daily with meals. 100 each 11    senna (SENOKOT) 8.6 mg tablet Take 1 tablet by mouth once daily.      tadalafil (CIALIS) 5 MG tablet Take 2 tablets (10 mg total) by mouth daily as needed for Erectile Dysfunction. 20 tablet 1    [DISCONTINUED] gabapentin (NEURONTIN) 300 MG capsule Take 1 capsule (300 mg total) by mouth 3 (three) times daily. 90 capsule 2     No current facility-administered medications on file prior to visit.        Family History   Problem Relation Age of Onset    Hypertension Mother     Diabetes Mother     Hypertension Father     Lupus Father     Post-traumatic stress disorder Brother     No Known Problems Maternal Grandmother     Colon cancer Maternal Grandfather     Breast cancer Paternal Grandmother     No Known Problems Paternal Grandfather     No Known Problems Sister     No Known Problems Maternal Aunt     No Known Problems Maternal Uncle     " "Fibromyalgia Paternal Aunt     Lupus Paternal Aunt     No Known Problems Paternal Uncle     No Known Problems Brother     No Known Problems Sister     No Known Problems Son     No Known Problems Son     No Known Problems Son     No Known Problems Son        Review of Systems   Constitutional: Negative for appetite change, chills, fever and unexpected weight change.   HENT: Negative for sore throat and trouble swallowing.    Eyes: Negative for pain and visual disturbance.   Respiratory: Negative for cough, chest tightness, shortness of breath and wheezing.    Cardiovascular: Negative for chest pain, palpitations and leg swelling.   Gastrointestinal: Negative for abdominal pain, blood in stool, constipation, diarrhea and nausea.   Genitourinary: Negative for difficulty urinating, dysuria and hematuria.   Musculoskeletal: Negative for arthralgias, gait problem and neck pain.   Skin: Negative for rash and wound.   Neurological: Positive for numbness (feet with sitting). Negative for dizziness, weakness, light-headedness and headaches.   Hematological: Negative for adenopathy.   Psychiatric/Behavioral: Negative for dysphoric mood.       Objective:      /82   Pulse 70   Temp 98.5 °F (36.9 °C) (Oral)   Ht 6' 2" (1.88 m)   Wt (!) 189.3 kg (417 lb 5.3 oz)   SpO2 97%   BMI 53.58 kg/m²   Physical Exam   Constitutional: He is oriented to person, place, and time. He appears well-developed and well-nourished. He is active. No distress.   HENT:   Head: Normocephalic and atraumatic.   Right Ear: External ear normal.   Left Ear: External ear normal.   Mouth/Throat: Uvula is midline, oropharynx is clear and moist and mucous membranes are normal. No oropharyngeal exudate.   Eyes: Pupils are equal, round, and reactive to light. Conjunctivae, EOM and lids are normal.   Neck: Normal range of motion, full passive range of motion without pain and phonation normal. Neck supple. No thyroid mass and no thyromegaly present. "   Cardiovascular: Normal rate, regular rhythm, normal heart sounds and intact distal pulses. Exam reveals no gallop and no friction rub.   No murmur heard.  Pulmonary/Chest: Effort normal and breath sounds normal. No respiratory distress. He has no wheezes. He has no rales.   Left chest-wall port   Musculoskeletal: Normal range of motion.   Lymphadenopathy:     He has no cervical adenopathy.   Neurological: He is alert and oriented to person, place, and time. No cranial nerve deficit. Coordination normal.   Skin: Skin is warm and dry.   Psychiatric: He has a normal mood and affect. His speech is normal and behavior is normal. Judgment and thought content normal.       Hospital records reviewed    Assessment:       1. New onset type 2 diabetes mellitus    2. Chemotherapy-induced neuropathy    3. Essential hypertension    4. Malignant neoplasm involving both nipple and areola of right breast in male, estrogen receptor negative    5. Class 3 severe obesity due to excess calories with serious comorbidity and body mass index (BMI) of 50.0 to 59.9 in adult        Plan:       New onset type 2 diabetes mellitus  -     Ambulatory referral/consult to Endocrinology; Future; Expected date: 10/23/2019    Chemotherapy-induced neuropathy  -     gabapentin (NEURONTIN) 300 MG capsule; Take 1 capsule (300 mg total) by mouth 3 (three) times daily.  Dispense: 90 capsule; Refill: 11    Essential hypertension    Malignant neoplasm involving both nipple and areola of right breast in male, estrogen receptor negative    Class 3 severe obesity due to excess calories with serious comorbidity and body mass index (BMI) of 50.0 to 59.9 in adult    Other orders  -     flash glucose scanning reader (FREESTYLE MARIANO 14 DAY READER) Misc; Check glucose continuousl;y  Dispense: 1 each; Refill: 0  -     flash glucose sensor (FREESTYLE MARIANO 14 DAY SENSOR) Kit; Check glucose continously  Dispense: 1 kit; Refill: 11      continue present meds  Follow-up  with diabetes education and endocrinology  Cleared to proceed with upcoming breast surgery as planned  Counseled on regular exercise, maintenance of a healthy weight, balanced diet rich in fruits/vegetables and lean protein, and avoidance of unhealthy habits like smoking and excessive alcohol intake.  F/u 3 months

## 2019-10-24 DIAGNOSIS — I10 HYPERTENSION, UNSPECIFIED TYPE: ICD-10-CM

## 2019-10-24 RX ORDER — LOSARTAN POTASSIUM AND HYDROCHLOROTHIAZIDE 25; 100 MG/1; MG/1
TABLET ORAL
Qty: 90 TABLET | Refills: 0 | Status: CANCELLED | OUTPATIENT
Start: 2019-10-24

## 2019-10-25 ENCOUNTER — PATIENT MESSAGE (OUTPATIENT)
Dept: SURGERY | Facility: OTHER | Age: 44
End: 2019-10-25

## 2019-10-25 DIAGNOSIS — E11.9 TYPE 2 DIABETES MELLITUS WITHOUT COMPLICATION: ICD-10-CM

## 2019-10-25 RX ORDER — LOSARTAN POTASSIUM AND HYDROCHLOROTHIAZIDE 25; 100 MG/1; MG/1
TABLET ORAL
Qty: 90 TABLET | Refills: 3 | Status: SHIPPED | OUTPATIENT
Start: 2019-10-25 | End: 2019-10-27 | Stop reason: CLARIF

## 2019-10-28 ENCOUNTER — PATIENT OUTREACH (OUTPATIENT)
Dept: ADMINISTRATIVE | Facility: HOSPITAL | Age: 44
End: 2019-10-28

## 2019-10-28 ENCOUNTER — TELEPHONE (OUTPATIENT)
Dept: HEMATOLOGY/ONCOLOGY | Facility: CLINIC | Age: 44
End: 2019-10-28

## 2019-10-29 ENCOUNTER — PATIENT MESSAGE (OUTPATIENT)
Dept: ENDOCRINOLOGY | Facility: HOSPITAL | Age: 44
End: 2019-10-29

## 2019-10-29 ENCOUNTER — LAB VISIT (OUTPATIENT)
Dept: LAB | Facility: HOSPITAL | Age: 44
End: 2019-10-29
Attending: INTERNAL MEDICINE
Payer: MEDICAID

## 2019-10-29 ENCOUNTER — OFFICE VISIT (OUTPATIENT)
Dept: HEMATOLOGY/ONCOLOGY | Facility: CLINIC | Age: 44
End: 2019-10-29
Payer: MEDICAID

## 2019-10-29 VITALS
OXYGEN SATURATION: 97 % | DIASTOLIC BLOOD PRESSURE: 74 MMHG | HEART RATE: 73 BPM | RESPIRATION RATE: 18 BRPM | WEIGHT: 315 LBS | BODY MASS INDEX: 40.43 KG/M2 | SYSTOLIC BLOOD PRESSURE: 134 MMHG | TEMPERATURE: 98 F | HEIGHT: 74 IN

## 2019-10-29 DIAGNOSIS — Z17.1 MALIGNANT NEOPLASM INVOLVING BOTH NIPPLE AND AREOLA OF RIGHT BREAST IN MALE, ESTROGEN RECEPTOR NEGATIVE: ICD-10-CM

## 2019-10-29 DIAGNOSIS — F43.23 ADJUSTMENT DISORDER WITH MIXED ANXIETY AND DEPRESSED MOOD: ICD-10-CM

## 2019-10-29 DIAGNOSIS — D50.9 MICROCYTIC ANEMIA: ICD-10-CM

## 2019-10-29 DIAGNOSIS — T45.1X5A CHEMOTHERAPY-INDUCED NEUROPATHY: ICD-10-CM

## 2019-10-29 DIAGNOSIS — I10 ESSENTIAL HYPERTENSION: ICD-10-CM

## 2019-10-29 DIAGNOSIS — C50.021 MALIGNANT NEOPLASM INVOLVING BOTH NIPPLE AND AREOLA OF RIGHT BREAST IN MALE, ESTROGEN RECEPTOR NEGATIVE: ICD-10-CM

## 2019-10-29 DIAGNOSIS — F51.04 PSYCHOPHYSIOLOGICAL INSOMNIA: ICD-10-CM

## 2019-10-29 DIAGNOSIS — C50.021 MALIGNANT NEOPLASM INVOLVING BOTH NIPPLE AND AREOLA OF RIGHT BREAST IN MALE, ESTROGEN RECEPTOR NEGATIVE: Primary | ICD-10-CM

## 2019-10-29 DIAGNOSIS — Z17.1 MALIGNANT NEOPLASM INVOLVING BOTH NIPPLE AND AREOLA OF RIGHT BREAST IN MALE, ESTROGEN RECEPTOR NEGATIVE: Primary | ICD-10-CM

## 2019-10-29 DIAGNOSIS — G62.0 CHEMOTHERAPY-INDUCED NEUROPATHY: ICD-10-CM

## 2019-10-29 DIAGNOSIS — E11.9 NEW ONSET TYPE 2 DIABETES MELLITUS: ICD-10-CM

## 2019-10-29 LAB
ALBUMIN SERPL BCP-MCNC: 3.5 G/DL (ref 3.5–5.2)
ALP SERPL-CCNC: 66 U/L (ref 55–135)
ALT SERPL W/O P-5'-P-CCNC: 36 U/L (ref 10–44)
ANION GAP SERPL CALC-SCNC: 8 MMOL/L (ref 8–16)
AST SERPL-CCNC: 20 U/L (ref 10–40)
BASOPHILS # BLD AUTO: 0.02 K/UL (ref 0–0.2)
BASOPHILS NFR BLD: 0.4 % (ref 0–1.9)
BILIRUB SERPL-MCNC: 0.7 MG/DL (ref 0.1–1)
BUN SERPL-MCNC: 13 MG/DL (ref 6–20)
CALCIUM SERPL-MCNC: 9 MG/DL (ref 8.7–10.5)
CHLORIDE SERPL-SCNC: 106 MMOL/L (ref 95–110)
CO2 SERPL-SCNC: 27 MMOL/L (ref 23–29)
CREAT SERPL-MCNC: 1.2 MG/DL (ref 0.5–1.4)
DIFFERENTIAL METHOD: ABNORMAL
EOSINOPHIL # BLD AUTO: 0 K/UL (ref 0–0.5)
EOSINOPHIL NFR BLD: 0.9 % (ref 0–8)
ERYTHROCYTE [DISTWIDTH] IN BLOOD BY AUTOMATED COUNT: 13.7 % (ref 11.5–14.5)
EST. GFR  (AFRICAN AMERICAN): >60 ML/MIN/1.73 M^2
EST. GFR  (NON AFRICAN AMERICAN): >60 ML/MIN/1.73 M^2
GLUCOSE SERPL-MCNC: 114 MG/DL (ref 70–110)
HCT VFR BLD AUTO: 34.9 % (ref 40–54)
HGB BLD-MCNC: 10.8 G/DL (ref 14–18)
IMM GRANULOCYTES # BLD AUTO: 0.01 K/UL (ref 0–0.04)
IMM GRANULOCYTES NFR BLD AUTO: 0.2 % (ref 0–0.5)
LYMPHOCYTES # BLD AUTO: 1.6 K/UL (ref 1–4.8)
LYMPHOCYTES NFR BLD: 36 % (ref 18–48)
MCH RBC QN AUTO: 25.9 PG (ref 27–31)
MCHC RBC AUTO-ENTMCNC: 30.9 G/DL (ref 32–36)
MCV RBC AUTO: 84 FL (ref 82–98)
MONOCYTES # BLD AUTO: 0.4 K/UL (ref 0.3–1)
MONOCYTES NFR BLD: 7.9 % (ref 4–15)
NEUTROPHILS # BLD AUTO: 2.5 K/UL (ref 1.8–7.7)
NEUTROPHILS NFR BLD: 54.6 % (ref 38–73)
NRBC BLD-RTO: 0 /100 WBC
PLATELET # BLD AUTO: 224 K/UL (ref 150–350)
PMV BLD AUTO: 10.7 FL (ref 9.2–12.9)
POTASSIUM SERPL-SCNC: 3.7 MMOL/L (ref 3.5–5.1)
PROT SERPL-MCNC: 6.7 G/DL (ref 6–8.4)
RBC # BLD AUTO: 4.17 M/UL (ref 4.6–6.2)
SODIUM SERPL-SCNC: 141 MMOL/L (ref 136–145)
WBC # BLD AUTO: 4.53 K/UL (ref 3.9–12.7)

## 2019-10-29 PROCEDURE — 99214 PR OFFICE/OUTPT VISIT, EST, LEVL IV, 30-39 MIN: ICD-10-PCS | Mod: S$PBB,,, | Performed by: INTERNAL MEDICINE

## 2019-10-29 PROCEDURE — 99999 PR PBB SHADOW E&M-EST. PATIENT-LVL III: ICD-10-PCS | Mod: PBBFAC,,, | Performed by: INTERNAL MEDICINE

## 2019-10-29 PROCEDURE — 80053 COMPREHEN METABOLIC PANEL: CPT

## 2019-10-29 PROCEDURE — 85025 COMPLETE CBC W/AUTO DIFF WBC: CPT

## 2019-10-29 PROCEDURE — 36415 COLL VENOUS BLD VENIPUNCTURE: CPT

## 2019-10-29 PROCEDURE — 99214 OFFICE O/P EST MOD 30 MIN: CPT | Mod: S$PBB,,, | Performed by: INTERNAL MEDICINE

## 2019-10-29 PROCEDURE — 99213 OFFICE O/P EST LOW 20 MIN: CPT | Mod: PBBFAC | Performed by: INTERNAL MEDICINE

## 2019-10-29 PROCEDURE — 99999 PR PBB SHADOW E&M-EST. PATIENT-LVL III: CPT | Mod: PBBFAC,,, | Performed by: INTERNAL MEDICINE

## 2019-10-29 NOTE — PROGRESS NOTES
History:     Reason For Consultation:   1. Stage IB ([id number]) triple negative invasive ductal carcinoma with lobular features of right breast, retroareolar, Grade 2, Ki67 80%, diagnosed 5/2019    Records Obtained: Records of the patients history including those obtained from the referring provider were reviewed and summarized in detail.    HPI:   Paul Li Jr. presents for follow up of breast cancer and hospital follow up. He's completed neoadjuvant chemo. He was admitted with hyperglycemia and new diabetes diagnosis. Remains on insulin now with good control. Energy improving. Saw surgery - plans for resection 11/22. Neuropathy in feet stable. Fatigue improving.     History: I reviewed, confirmed and updated history (past medical, social, family) as necessary.    Medications    Current Outpatient Medications:     amLODIPine (NORVASC) 10 MG tablet, TAKE 1 TABLET (10 MG) BY MOUTH EVERY DAY, Disp: 30 tablet, Rfl: 10    blood sugar diagnostic Strp, Test 2 (two) times daily with meals., Disp: 100 strip, Rfl: 11    blood sugar diagnostic Strp, To check BG 4 times daily, to use with insurance preferred meter, Disp: 200 each, Rfl: 11    blood sugar diagnostic Strp, Use to test BG 4 times a day (one Touch Viero), Disp: 200 strip, Rfl: 3    blood-glucose meter kit, Use as instructed, Disp: 1 each, Rfl: 0    flash glucose scanning reader (FREESTYLE MARIANO 14 DAY READER) Misc, Check glucose continuousl;y, Disp: 1 each, Rfl: 0    flash glucose sensor (FREESTYLE MARIANO 14 DAY SENSOR) Kit, Check glucose continously, Disp: 1 kit, Rfl: 11    gabapentin (NEURONTIN) 300 MG capsule, Take 1 capsule (300 mg total) by mouth 3 (three) times daily., Disp: 90 capsule, Rfl: 11    hydroCHLOROthiazide (HYDRODIURIL) 25 MG tablet, TAKE 1 TABLET BY MOUTH ONCE DAILY, Disp: 90 tablet, Rfl: 3    insulin aspart U-100 (NOVOLOG) 100 unit/mL (3 mL) InPn pen, Inject 24 Units into the skin 3 (three) times daily., Disp: 15 mL, Rfl: 0     "insulin detemir U-100 (LEVEMIR FLEXTOUCH) 100 unit/mL (3 mL) SubQ InPn pen, Inject 40 Units into the skin 2 (two) times daily., Disp: 15 mL, Rfl: 0    lancets 33 gauge Misc, Use 2 (two) times daily with meals., Disp: 100 each, Rfl: 11    lancets 33 gauge Misc, To check BG 4 times daily, to use with insurance preferred meter, Disp: 200 each, Rfl: 11    lancing device Misc, 1 Device by Misc.(Non-Drug; Combo Route) route 2 (two) times daily with meals., Disp: 1 each, Rfl: 0    lidocaine-prilocaine (EMLA) cream, Apply topically to affected area 45 minutes before port access, Disp: 30 g, Rfl: 1    losartan (COZAAR) 100 MG tablet, TAKE 1 TABLET BY MOUTH ONCE DAILY, Disp: 90 tablet, Rfl: 3    metFORMIN (GLUCOPHAGE-XR) 500 MG 24 hr tablet, Take 2 tablets (1,000 mg total) by mouth daily with breakfast., Disp: 180 tablet, Rfl: 3    ondansetron (ZOFRAN-ODT) 8 MG TbDL, Take 1 tablet (8 mg total) by mouth every 12 (twelve) hours as needed., Disp: 20 tablet, Rfl: 1    pen needle, diabetic (BD ULTRA-FINE TANESHA PEN NEEDLE) 32 gauge x 5/32" Ndle, Use as directed with novolog and levemir, Disp: 100 each, Rfl: 0    pen needle, diabetic 32 gauge x 5/32" Ndle, 1 each by Misc.(Non-Drug; Combo Route) route 2 (two) times daily with meals., Disp: 100 each, Rfl: 11    senna (SENOKOT) 8.6 mg tablet, Take 1 tablet by mouth once daily., Disp: , Rfl:     tadalafil (CIALIS) 5 MG tablet, Take 2 tablets (10 mg total) by mouth daily as needed for Erectile Dysfunction., Disp: 20 tablet, Rfl: 1  Allergies  Review of patient's allergies indicates:  No Known Allergies  Review of Systems  Review of Systems   Constitutional: Positive for fatigue. Negative for activity change, appetite change, chills, fever and unexpected weight change.   HENT: Negative for congestion, hearing loss, nosebleeds, sinus pressure, sinus pain and trouble swallowing.    Eyes: Negative for pain, discharge, itching and visual disturbance.   Respiratory: Negative for " "cough, chest tightness and shortness of breath.    Cardiovascular: Negative for chest pain, palpitations and leg swelling.   Gastrointestinal: Negative for abdominal distention, abdominal pain, blood in stool, constipation, diarrhea, nausea, rectal pain and vomiting.   Endocrine: Negative for heat intolerance and polydipsia.   Genitourinary: Negative for difficulty urinating, dysuria, flank pain, frequency, hematuria and urgency.   Musculoskeletal: Negative for arthralgias, back pain and neck pain.   Skin: Positive for color change (nails). Negative for pallor and rash.   Neurological: Positive for numbness. Negative for dizziness and headaches.   Hematological: Negative for adenopathy. Does not bruise/bleed easily.   Psychiatric/Behavioral: Negative for confusion, decreased concentration and sleep disturbance. The patient is not nervous/anxious.         Objective     Objective:     Vitals:    10/29/19 0934   BP: 134/74   BP Location: Left arm   Patient Position: Sitting   BP Method: Large (Automatic)   Pulse: 73   Resp: 18   Temp: 98 °F (36.7 °C)   TempSrc: Oral   SpO2: 97%   Weight: (!) 189.1 kg (416 lb 14.2 oz)   Height: 6' 2" (1.88 m)   Body mass index is 53.53 kg/m².    Body surface area is 3.14 meters squared.  Physical Exam   Constitutional: He is oriented to person, place, and time. He appears well-developed and well-nourished. No distress.   HENT:   Head: Normocephalic and atraumatic.   Mouth/Throat: Oropharynx is clear and moist and mucous membranes are normal. No oral lesions.   Eyes: Conjunctivae and EOM are normal. Right eye exhibits no discharge. Left eye exhibits no discharge. No scleral icterus.   Cardiovascular: Normal rate, regular rhythm, S1 normal, S2 normal and normal heart sounds.   Pulmonary/Chest: Effort normal and breath sounds normal. No respiratory distress. He has no wheezes. He has no rhonchi. He has no rales.   Right breast with nipple retraction and skin thickening greatly improved; no " palpable mass; unchanged.   Left mediport   Abdominal: Soft. Normal appearance and bowel sounds are normal. There is no tenderness.   Musculoskeletal: Normal range of motion. He exhibits no edema or deformity.   Neurological: He is alert and oriented to person, place, and time. He has normal strength.   Skin: Skin is warm, dry and intact. No pallor.   Darkening and thinning of nails   Psychiatric: His behavior is normal. Thought content normal.     Initial Breast exam (pre-chemo): Right breast with nipple retraction and skin thickening around nipple without well defined mass behind the skin thickening.     Labs and Imaging  Results for orders placed or performed in visit on 10/29/19   Comprehensive metabolic panel   Result Value Ref Range    Sodium 141 136 - 145 mmol/L    Potassium 3.7 3.5 - 5.1 mmol/L    Chloride 106 95 - 110 mmol/L    CO2 27 23 - 29 mmol/L    Glucose 114 (H) 70 - 110 mg/dL    BUN, Bld 13 6 - 20 mg/dL    Creatinine 1.2 0.5 - 1.4 mg/dL    Calcium 9.0 8.7 - 10.5 mg/dL    Total Protein 6.7 6.0 - 8.4 g/dL    Albumin 3.5 3.5 - 5.2 g/dL    Total Bilirubin 0.7 0.1 - 1.0 mg/dL    Alkaline Phosphatase 66 55 - 135 U/L    AST 20 10 - 40 U/L    ALT 36 10 - 44 U/L    Anion Gap 8 8 - 16 mmol/L    eGFR if African American >60.0 >60 mL/min/1.73 m^2    eGFR if non African American >60.0 >60 mL/min/1.73 m^2   CBC auto differential   Result Value Ref Range    WBC 4.53 3.90 - 12.70 K/uL    RBC 4.17 (L) 4.60 - 6.20 M/uL    Hemoglobin 10.8 (L) 14.0 - 18.0 g/dL    Hematocrit 34.9 (L) 40.0 - 54.0 %    Mean Corpuscular Volume 84 82 - 98 fL    Mean Corpuscular Hemoglobin 25.9 (L) 27.0 - 31.0 pg    Mean Corpuscular Hemoglobin Conc 30.9 (L) 32.0 - 36.0 g/dL    RDW 13.7 11.5 - 14.5 %    Platelets 224 150 - 350 K/uL    MPV 10.7 9.2 - 12.9 fL    Immature Granulocytes 0.2 0.0 - 0.5 %    Gran # (ANC) 2.5 1.8 - 7.7 K/uL    Immature Grans (Abs) 0.01 0.00 - 0.04 K/uL    Lymph # 1.6 1.0 - 4.8 K/uL    Mono # 0.4 0.3 - 1.0 K/uL    Eos #  0.0 0.0 - 0.5 K/uL    Baso # 0.02 0.00 - 0.20 K/uL    nRBC 0 0 /100 WBC    Gran% 54.6 38.0 - 73.0 %    Lymph% 36.0 18.0 - 48.0 %    Mono% 7.9 4.0 - 15.0 %    Eosinophil% 0.9 0.0 - 8.0 %    Basophil% 0.4 0.0 - 1.9 %    Differential Method Automated        Assessment     Assessment:   1. Stage IB ([id number]) invasive ductal carcinoma with lobular features of right breast, retroareolar, ER neg, MA neg, Her2 neg, Grade 2, Ki67 80%, diagnosed 5/2019   * Genetics negative   * 5/27/19 - 10/8/19 completed neoadjuvant ddAC followed by weekly Taxol.    * US after ddAC showing improving disease   * To OR 11/22. Discussed possibility of adjuvant therapy depending on surgical pathology.     2. Microcytic anemia   * Has anemia as baseline but worsened with chemotherapy   * Monitoring. Stable    3. Adjustment disorder/depression.    * Dr Nunez; Improving.      4. Drug induced constipation   * Continue senna S.     5. HTN per PCP    6. Insomnia   * Tried melatonin without success   * Restoril 15 mg nightly for insomnia. Gabapentin which he's getting for PN may help too   * Improving.     7. Chemo neuropathy   * Gabapentin 300 mg tid. Decreased dose of taxol, cycle 3 forward. Monitor. Stable.     8. Erectile dysfunction   * Cialis    9. Diabetes mellitus   * Following with PCP; on insulin now.       Plan:   To OR 11/22. Will plan to leave port in case he has residual disease on pathology.   Continue Restoril for insomnia  Continue gabapentin 300 mg tid.   Diabetes management  Follow up in 6 weeks with cbc, cmp, port flush.     Future Appointments   Date Time Provider Department Center   10/30/2019  2:00 PM Katelynn Harrell, PT [facility] OP [facility]   11/4/2019  1:00 PM Heidy Segal RD, CDE [facility] DIABETE [place]   11/6/2019 10:00 AM Wu Elaine MD [facility] ENDODIA [place]   11/7/2019  1:30 PM JEB Burnette MD [facility] UROLOGY [place]   11/22/2019  7:00 AM [facility] NM1 [facility] NUCLEAR [facility]   12/5/2019  1:30 PM Shima Whyte,  MD [facility] MOSES [place]   12/6/2019 11:50 AM LAB, HEMONC CANCER BLDG [facility]   12/6/2019  1:00 PM Richa Bahena MD [facility] HEM ONC [facility]   12/6/2019  1:30 PM INJECTION, [facility] INFUSION [facility] CHEMO [facility]   1/23/2020 10:40 AM Kareem Arroyo MD [facility]

## 2019-10-30 ENCOUNTER — PATIENT MESSAGE (OUTPATIENT)
Dept: FAMILY MEDICINE | Facility: CLINIC | Age: 44
End: 2019-10-30

## 2019-10-30 ENCOUNTER — TELEPHONE (OUTPATIENT)
Dept: REHABILITATION | Facility: HOSPITAL | Age: 44
End: 2019-10-30

## 2019-10-31 ENCOUNTER — PATIENT OUTREACH (OUTPATIENT)
Dept: ADMINISTRATIVE | Facility: OTHER | Age: 44
End: 2019-10-31

## 2019-10-31 RX ORDER — INSULIN ASPART 100 [IU]/ML
24 INJECTION, SOLUTION INTRAVENOUS; SUBCUTANEOUS 3 TIMES DAILY
Qty: 64.8 ML | Refills: 3 | Status: SHIPPED | OUTPATIENT
Start: 2019-10-31 | End: 2020-04-07

## 2019-11-04 ENCOUNTER — CLINICAL SUPPORT (OUTPATIENT)
Dept: DIABETES | Facility: CLINIC | Age: 44
End: 2019-11-04
Payer: MEDICAID

## 2019-11-04 ENCOUNTER — PATIENT MESSAGE (OUTPATIENT)
Dept: DIABETES | Facility: CLINIC | Age: 44
End: 2019-11-04

## 2019-11-04 DIAGNOSIS — E11.9 NEW ONSET TYPE 2 DIABETES MELLITUS: ICD-10-CM

## 2019-11-04 PROCEDURE — 99999 PR PBB SHADOW E&M-EST. PATIENT-LVL I: CPT | Mod: PBBFAC,,, | Performed by: DIETITIAN, REGISTERED

## 2019-11-04 PROCEDURE — 99999 PR PBB SHADOW E&M-EST. PATIENT-LVL I: ICD-10-PCS | Mod: PBBFAC,,, | Performed by: DIETITIAN, REGISTERED

## 2019-11-04 PROCEDURE — G0108 DIAB MANAGE TRN  PER INDIV: HCPCS | Mod: PBBFAC | Performed by: DIETITIAN, REGISTERED

## 2019-11-04 PROCEDURE — 99211 OFF/OP EST MAY X REQ PHY/QHP: CPT | Mod: PBBFAC | Performed by: DIETITIAN, REGISTERED

## 2019-11-05 NOTE — PROGRESS NOTES
Diabetes Education  Author: Heidy Segal RD, CDE  Date: 11/4/2019    Diabetes Care Management Summary  Diabetes Education Record Assessment: Initial (hospital d/c)    Current Diabetes Risk Level: Moderate     Last A1c:   Lab Results   Component Value Date    HGBA1C 9.8 (H) 10/10/2019     Last visit with Diabetes Educator: none, newly dx    Diabetes Type : Type II  Diabetes Diagnosis: 0-1 year (new onset)      Current Treatment: Oral Medication, Insulin  Levemir 40 units BID;   Novolog 20 units AC + CS  Metformin 500 mg daily (titrating to 1000 mg BID)            Reviewed Problem List with Patient: Yes    Health Maintenance was reviewed today with patient. Discussed with patient importance of routine eye exams, foot exams/foot care, blood work (i.e.: A1c, microalbumin, and lipid), dental visits, yearly flu vaccine, and pneumonia vaccine as indicated by PCP. Patient verbalized understanding.     Health Maintenance Topics with due status: Not Due       Topic Last Completion Date    TETANUS VACCINE 10/24/2017    Hemoglobin A1c 10/10/2019     Health Maintenance Due   Topic Date Due    Foot Exam  10/17/1987    Eye Exam  10/17/1987    Pneumococcal Vaccine (Highest Risk) (1 of 3 - PCV13) 10/17/1996    Low Dose Statin  10/17/1998    Lipid Panel  06/15/2019       Nutrition  Meal Planning: 3 meals per day (minimal snacking)  What type of beverages do you drink?: diet soda/tea, water (coke zero; occasional lemonade)  Meal Plan 24 Hour Recall - Breakfast: (grits, eggs, bologna, OR oatmeal)  Meal Plan 24 Hour Recall - Lunch: (sandwich, chips, OR leftovers)  Meal Plan 24 Hour Recall - Dinner: (xena steak, potatoes)  Meal Plan 24 Hour Recall - Snack: (chips)    Monitoring   Self Monitoring : (SMBG AC/HS)  Blood Glucose Logs: Yes  Do you use a personal continuous glucose monitor?: No  In the last month, how often have you had a low blood sugar reaction?: never    Exercise   Exercise Type: walking    Social  History  Preferred Learning Method: Face to Face, Demonstration, Hands On, Reading Materials  Primary Support: Self, Spouse (wife present at visit)  Smoking Status: Ex Smoker  Alcohol Use: Weekly  Barriers to Change: None  Learning Challenges : None   Readiness to Learn : Acceptance  Cultural Influences: No        Diabetes Education Assessment/Progress  -Diabetes Disease Process (diabetes disease process and treatment options): Discussion, Instructed, Individual Session, Written Materials Provided, Comprehends Key Points  Discussed qualifying parameters of diabetes dx. Reviewed disease process and general progression. Discussed pt's most likely cause of recently elevated Hgb A1c. Reviewed current treatment plan and discussed all treatment options available, especially dietary and lifestyle modifications, as well as medication additions and/or changes.    -Nutrition (Incorporating nutritional management into one's lifestyle): Discussion, Instructed, Individual Session, Written Materials Provided, Comprehends Key Points  Emphasized importance of eliminating all SSB. Discussed SF drink options. Reviewed all carb vs non-carb containing foods and discussed the appropriate amounts of carb to have at meals vs snacks. Recommended 60-75 gm carb at meals and <20 gm carb at snacks. Reviewed label reading and how to determine appropriate serving sizes of specific carb containing foods. Reviewed need to limit total/saturated fats despite lesser effect on BG increase. Encouraged carb sources primarily from whole grains, fresh/frozen fruits, and low-fat milk and yogurt. Discussed meal plans and snack ideas amenable to pt.     -Physical Activity (incorporating physical activity into one's lifestyle): Discussion, Instructed, Individual Session, Written Materials Provided, Comprehends Key Points  Discussed goals and benefits of regular PA. Reviewed difference between active lifestyle and structured physical activity. Encouraged PA  with increased heart rate for sustained duration as tolerated. Reviewed PA goals of >150 minutes weekly.     -Medications (states correct name, dose, onset, peak, duration, side effects & timing of meds): Discussion, Instructed, Individual Session, Written Materials Provided, Comprehends Key Points  Discussed timing and MOA of metformin. Discussed possible side effects and how to avoid these.  Discussed timing and MOA of long vs rapid acting insulin. Reviewed need for rotation of injection sites, appropriate insulin storage, and insulin pen use. Emphasized need to take Levemir at same time daily.  Emphasized need to take Novolog injections 5-10 min prior to eating meals.  Reviewed appropriate use of sliding scale and only to correct high BG if >4 hours since last rapid-acting injection.  Reviewed BG logs with endo NP FRANKIE Kim; instructed to decrease Levemir to 36 units BID and Novolog to 16 units with meals.    -Monitoring (monitoring blood glucose/other parameters & using results): Discussion, Instructed, Individual Session, Written Materials Provided, Comprehends Key Points  Discussed goal BGs for different times of day and in relation to meals. Instructed pt to test BG AC/HS. Reviewed need for updated BG logs for all endo, PCP, and education appts.    -Acute Complications (preventing, detecting, and treating acute complications): Discussion, Instructed, Individual Session, Written Materials Provided, Comprehends Key Points  Discussed hypoglycemia vs hyperglycemia symptoms and discussed appropriate treatments for each. Reviewed that pt is at high risk of hypo with current medication regimen. Discussed general vs severe hyperglycemia and risk of DKA.    -Chronic Complications (preventing, detecting, and treating chronic complications): Discussion, Instructed, Individual Session, Written Materials Provided, Comprehends Key Points  Reviewed annual diabetes care schedule and patient priorities.    -Clinical (diabetes,  other pertinent medical history, and relevant comorbidities reviewed during visit): Discussion  Recently admitted to hospital for new onset diabetes and hyperglycemia. Recently finished chemotherapy d/t dx of breast cancer.    -Cognitive (knowledge of self-management skills, functional health literacy): Individual Session  Arrives with general health management knowledge - little specific knowledge of diabetes management. Leaves with increased knowledge base - will benefit from f/u in 3 months.    -Psychosocial (emotional response to diabetes): Individual Session  No negative feelings toward diabetes dx or disease management noted.    -Diabetes Distress and Support Systems: Individual Session  No distress noted.     -Behavioral (readiness for change, lifestyle practices, self-care behaviors): Individual Session  Appears motivated to make recommended changes.         Goals  Patient has selected/evaluated goals during today's session: Yes, selected    Healthy Eating: Set (Limit carbs to <75 gm per meal)  Start Date: 11/04/19  Target Date: 11/04/20         Diabetes Care Plan/Intervention  Education Plan/Intervention: Individual Follow-Up DSMT (3 mo f/u scheduled)      Diabetes Meal Plan  Restrictions: Restricted Carbohydrate, Low Fat, Low Sodium  Carbohydrate Per Meal: 60-75g, 45-60g  Carbohydrate Per Snack : 7-15g, 15-20g      Today's Self-Management Care Plan was developed with the patient's input and is based on barriers identified during today's assessment.    The long and short-term goals in the care plan were written with the patient/caregiver's input. The patient has agreed to work toward these goals to improve his overall diabetes control.      The patient received a copy of today's self-management plan and verbalized understanding of the care plan, goals, and all of today's instructions.      The patient was encouraged to communicate with his physician and care team regarding his condition(s) and treatment.  I  provided the patient with my contact information today and encouraged him to contact me via phone or patient portal as needed.       Education Units of Time   Time Spent: 60 min

## 2019-11-06 ENCOUNTER — TELEPHONE (OUTPATIENT)
Dept: REHABILITATION | Facility: HOSPITAL | Age: 44
End: 2019-11-06

## 2019-11-06 ENCOUNTER — OFFICE VISIT (OUTPATIENT)
Dept: ENDOCRINOLOGY | Facility: CLINIC | Age: 44
End: 2019-11-06
Payer: MEDICAID

## 2019-11-06 ENCOUNTER — PATIENT OUTREACH (OUTPATIENT)
Dept: ADMINISTRATIVE | Facility: OTHER | Age: 44
End: 2019-11-06

## 2019-11-06 VITALS
DIASTOLIC BLOOD PRESSURE: 78 MMHG | SYSTOLIC BLOOD PRESSURE: 130 MMHG | BODY MASS INDEX: 40.43 KG/M2 | HEIGHT: 74 IN | WEIGHT: 315 LBS

## 2019-11-06 DIAGNOSIS — I10 ESSENTIAL HYPERTENSION: ICD-10-CM

## 2019-11-06 DIAGNOSIS — E66.01 MORBID OBESITY WITH BMI OF 50.0-59.9, ADULT: ICD-10-CM

## 2019-11-06 DIAGNOSIS — E11.9 NEW ONSET TYPE 2 DIABETES MELLITUS: Primary | ICD-10-CM

## 2019-11-06 PROCEDURE — 99999 PR PBB SHADOW E&M-EST. PATIENT-LVL III: CPT | Mod: PBBFAC,,, | Performed by: INTERNAL MEDICINE

## 2019-11-06 PROCEDURE — 99213 OFFICE O/P EST LOW 20 MIN: CPT | Mod: PBBFAC | Performed by: INTERNAL MEDICINE

## 2019-11-06 PROCEDURE — 99999 PR PBB SHADOW E&M-EST. PATIENT-LVL III: ICD-10-PCS | Mod: PBBFAC,,, | Performed by: INTERNAL MEDICINE

## 2019-11-06 PROCEDURE — 99214 OFFICE O/P EST MOD 30 MIN: CPT | Mod: S$PBB,,, | Performed by: INTERNAL MEDICINE

## 2019-11-06 PROCEDURE — 99214 PR OFFICE/OUTPT VISIT, EST, LEVL IV, 30-39 MIN: ICD-10-PCS | Mod: S$PBB,,, | Performed by: INTERNAL MEDICINE

## 2019-11-06 RX ORDER — ATORVASTATIN CALCIUM 20 MG/1
20 TABLET, FILM COATED ORAL DAILY
Qty: 90 TABLET | Refills: 3 | Status: SHIPPED | OUTPATIENT
Start: 2019-11-06 | End: 2021-04-15

## 2019-11-06 RX ORDER — PEN NEEDLE, DIABETIC 30 GX3/16"
NEEDLE, DISPOSABLE MISCELLANEOUS
Qty: 100 EACH | Refills: 5 | Status: SHIPPED | OUTPATIENT
Start: 2019-11-06 | End: 2021-11-12 | Stop reason: SDUPTHER

## 2019-11-06 NOTE — ASSESSMENT & PLAN NOTE
--Patient with recently diagnosed type 2 diabetes that developed after getting chemotherapy and high dose dexamethasone  --A1c goal <7.0%  --Last A1c 9.8%  --Most of his current glucose levels are at or near goal  --As doses just reduced to Levemir 36 units BID and Novolog 16 units AC will continue for now  --He will send me glucose logs to review next week  --continue metformin 1000 mg PO BID  --Foot exam done today  --Will schedule eye exam  --Urine micro with next set of labs

## 2019-11-06 NOTE — PROGRESS NOTES
Subjective:      Patient ID: Paul Li Jr. is a 44 y.o. male.    Chief Complaint:  Diabetes      History of Present Illness  Mr. Li presents for follow up of type 2 diabetes.  Last visit with MATTIE Candelaria NP in 10/2019.     Has active history of DM2, HTN and obesity. Has right breast Stage 1B triple negative invasive ductal carcinoma s/p chemotherapy. Developed diabetes while receiving chemotherapy and dexamethasone. He has completed chemotherapy.     Type 2 diabetes first diagnosed in 10/2019.    Diabetes Complications:  Has neuropathy secondary to chemotherapy.   Denies retinopathy, overdue for eye exam  Had microalbuminuria in 6/2018. Will repeat urine micro with next set of labs. On ARB.    Current Diabetes Regimen:  Levemir 36 units BID  Novolog 16 units AC with low dose correction scale  Metformin 1000 mg PO BID    Reports full compliance with diabetes regimen.    Current Self Reported Glucoses:  AM:   Lunch:   Dinner:   Bedtime:     Denies any hypoglycemia.     Eats 3 regular meals daily, no snacks.    Last saw diabetes educator on 11/4/2019.    On ARB for HTN and microalbuminuria.     Not on statin.    Review of Systems   Constitutional: Negative for chills and fever.   Gastrointestinal: Negative for nausea and vomiting.       Objective:   Physical Exam   Nursing note and vitals reviewed.  injection sites are ok. No lipo hypertropthy or atrophy  Protective Sensation (w/ 10 gram monofilament):  Right: Decreased  Left: Decreased    Visual Inspection:  Normal -  Bilateral    Pedal Pulses:   Right: Present  Left: Present    Posterior tibialis:   Right:Present  Left: Present      BP Readings from Last 3 Encounters:   11/06/19 130/78   10/29/19 134/74   10/23/19 124/82     Wt Readings from Last 1 Encounters:   11/06/19 1005 (!) 186.5 kg (411 lb 4.3 oz)       Body mass index is 52.8 kg/m².    Lab Review:   Lab Results   Component Value Date    HGBA1C 9.8 (H) 10/10/2019     Lab Results    Component Value Date    CHOL 148 06/15/2018    HDL 32 (L) 06/15/2018    LDLCALC 101.0 06/15/2018    TRIG 75 06/15/2018    CHOLHDL 21.6 06/15/2018     Lab Results   Component Value Date     10/29/2019    K 3.7 10/29/2019     10/29/2019    CO2 27 10/29/2019     (H) 10/29/2019    BUN 13 10/29/2019    CREATININE 1.2 10/29/2019    CALCIUM 9.0 10/29/2019    PROT 6.7 10/29/2019    ALBUMIN 3.5 10/29/2019    BILITOT 0.7 10/29/2019    ALKPHOS 66 10/29/2019    AST 20 10/29/2019    ALT 36 10/29/2019    ANIONGAP 8 10/29/2019    ESTGFRAFRICA >60.0 10/29/2019    EGFRNONAA >60.0 10/29/2019    TSH 0.898 06/15/2018         Assessment and Plan     New onset type 2 diabetes mellitus  --Patient with recently diagnosed type 2 diabetes that developed after getting chemotherapy and high dose dexamethasone  --A1c goal <7.0%  --Last A1c 9.8%  --Most of his current glucose levels are at or near goal  --As doses just reduced to Levemir 36 units BID and Novolog 16 units AC will continue for now  --He will send me glucose logs to review next week  --continue metformin 1000 mg PO BID  --Foot exam done today  --Will schedule eye exam  --Urine micro with next set of labs      Morbid obesity with BMI of 50.0-59.9, adult  --Discussed role of weight loss with being able to reduce insulin needs    Essential hypertension  --Bp at goal on current regimen    Wu Elaine M.D. Staff Endocrinology

## 2019-11-06 NOTE — LETTER
November 6, 2019      Kareem Arroyo MD  [address] [facility] [address] [place] [address]           [facility] - Endocrinology [facility]  [address]  [facility] [address]  Phone: [phone]  Fax: [phone]          Patient: Paul Li Jr.   MR Number: [id number]   YOB: 1975   Date of Visit: 11/6/2019       Dear Dr. Kareem Arroyo:    Thank you for referring Paul Li to me for evaluation. Attached you will find relevant portions of my assessment and plan of care.    If you have questions, please do not hesitate to call me. I look forward to following Paul Li along with you.    Sincerely,    Wu Elaine MD    Enclosure  CC:  No Recipients    If you would like to receive this communication electronically, please contact [email or web address] or [phone] to request more information on [facility] Link access.    For providers and/or their staff who would like to refer a patient to [facility], please contact us through our one-stop-shop provider referral line, [facility], at [phone].    If you feel you have received this communication in error or would no longer like to receive these types of communications, please e-mail [email or web address]

## 2019-11-07 ENCOUNTER — OFFICE VISIT (OUTPATIENT)
Dept: UROLOGY | Facility: CLINIC | Age: 44
End: 2019-11-07
Payer: MEDICAID

## 2019-11-07 ENCOUNTER — TELEPHONE (OUTPATIENT)
Dept: SURGERY | Facility: CLINIC | Age: 44
End: 2019-11-07

## 2019-11-07 VITALS
HEIGHT: 74 IN | WEIGHT: 315 LBS | DIASTOLIC BLOOD PRESSURE: 64 MMHG | BODY MASS INDEX: 40.43 KG/M2 | SYSTOLIC BLOOD PRESSURE: 115 MMHG | HEART RATE: 70 BPM

## 2019-11-07 DIAGNOSIS — N43.3 HYDROCELE IN ADULT: Primary | ICD-10-CM

## 2019-11-07 PROCEDURE — 55000 PR DRAINAGE OF HYDROCELE,TUNICA: ICD-10-PCS | Mod: S$PBB,RT,, | Performed by: UROLOGY

## 2019-11-07 PROCEDURE — 99213 OFFICE O/P EST LOW 20 MIN: CPT | Mod: PBBFAC,PO,25 | Performed by: UROLOGY

## 2019-11-07 PROCEDURE — 99999 PR PBB SHADOW E&M-EST. PATIENT-LVL III: CPT | Mod: PBBFAC,,, | Performed by: UROLOGY

## 2019-11-07 PROCEDURE — 55000 DRAINAGE OF HYDROCELE: CPT | Mod: S$PBB,RT,, | Performed by: UROLOGY

## 2019-11-07 PROCEDURE — 99999 PR PBB SHADOW E&M-EST. PATIENT-LVL III: ICD-10-PCS | Mod: PBBFAC,,, | Performed by: UROLOGY

## 2019-11-07 PROCEDURE — 99213 PR OFFICE/OUTPT VISIT, EST, LEVL III, 20-29 MIN: ICD-10-PCS | Mod: 25,S$PBB,, | Performed by: UROLOGY

## 2019-11-07 PROCEDURE — 55000 DRAINAGE OF HYDROCELE: CPT | Mod: PBBFAC,PO | Performed by: UROLOGY

## 2019-11-07 PROCEDURE — 99213 OFFICE O/P EST LOW 20 MIN: CPT | Mod: 25,S$PBB,, | Performed by: UROLOGY

## 2019-11-07 NOTE — PROGRESS NOTES
Subjective:       Patient ID: Paul Li Jr. is a 44 y.o. male.    Chief Complaint: hydrocele (follow up)    HPI     43 year old with a large right hydrocele.  It has been present for years.   Scrotal ultrasound confirmed a large right hydrocele.  He says it is causing significant discomfort.  He has no voiding complaints.  He denies hematuria and dysuria.  He underwent needle drainage on  several previous occasions.   The fluid has reaccumulated and he wants to proceed with aspiration another time for more definitive surgical repair. He is now interested in surgical repair.  He is also interested in Vasectomy.  He has one adult child.      Review of Systems   Constitutional: Negative for fever.   Genitourinary: Negative for dysuria and hematuria.       Objective:      Physical Exam   Constitutional: He is oriented to person, place, and time. He appears well-developed and well-nourished.   Pulmonary/Chest: Effort normal.   Abdominal: Soft.   Genitourinary:   Genitourinary Comments: Large right hydrocele   Neurological: He is alert and oriented to person, place, and time.   Skin: No rash noted.   Psychiatric: He has a normal mood and affect.   Vitals reviewed.      Procedure     Right hydrocele drainage:     The scrotal was prepped and draped sterilely. Using sterile technique, I introduced a Jelco needle into the hydrocele sac. I then aspirated approximately 320 milliliter(s) of straw-colored fluid. He tolerated the procedure well and there were no complications there was no bleeding from the injection site. No path was sent.      Assessment:       1. Hydrocele in adult        Plan:       Hydrocele in adult      follow-up is needed

## 2019-11-07 NOTE — TELEPHONE ENCOUNTER
Spoke to Patient.  Informed him that additional pre-op appointments were made for him at [facility] following his pre-op appointment with Anesthesia.  We will need lab, ekg, and a cxr in preparation for surgery with Dr. Whyte.  Stated that he has [facility] and will look at the appointments before 11-14-19.  Explained where each test was located and he agreed that he can get instructions from the Pre-Op nurses on 11-14-19.  Verbalized understanding of instructions.

## 2019-11-11 ENCOUNTER — OFFICE VISIT (OUTPATIENT)
Dept: OPTOMETRY | Facility: CLINIC | Age: 44
End: 2019-11-11
Payer: MEDICAID

## 2019-11-11 DIAGNOSIS — E11.9 TYPE 2 DIABETES MELLITUS WITHOUT RETINOPATHY: Primary | ICD-10-CM

## 2019-11-11 DIAGNOSIS — H52.7 REFRACTIVE ERROR: ICD-10-CM

## 2019-11-11 DIAGNOSIS — H02.889 MEIBOMIAN GLAND DYSFUNCTION (MGD): ICD-10-CM

## 2019-11-11 PROCEDURE — 92004 PR EYE EXAM, NEW PATIENT,COMPREHESV: ICD-10-PCS | Mod: S$PBB,,, | Performed by: OPTOMETRIST

## 2019-11-11 PROCEDURE — 92015 PR REFRACTION: ICD-10-PCS | Mod: ,,, | Performed by: OPTOMETRIST

## 2019-11-11 PROCEDURE — 99999 PR PBB SHADOW E&M-EST. PATIENT-LVL II: ICD-10-PCS | Mod: PBBFAC,,, | Performed by: OPTOMETRIST

## 2019-11-11 PROCEDURE — 99212 OFFICE O/P EST SF 10 MIN: CPT | Mod: PBBFAC | Performed by: OPTOMETRIST

## 2019-11-11 PROCEDURE — 99999 PR PBB SHADOW E&M-EST. PATIENT-LVL II: CPT | Mod: PBBFAC,,, | Performed by: OPTOMETRIST

## 2019-11-11 PROCEDURE — 92015 DETERMINE REFRACTIVE STATE: CPT | Mod: ,,, | Performed by: OPTOMETRIST

## 2019-11-11 PROCEDURE — 92004 COMPRE OPH EXAM NEW PT 1/>: CPT | Mod: S$PBB,,, | Performed by: OPTOMETRIST

## 2019-11-11 NOTE — LETTER
November 11, 2019      Wu Elaine MD  [address]  [facility] [address]           [facility]  [address]  [facility] [address]  Phone: [phone]          Patient: Paul Li Jr.   MR Number: [id number]   YOB: 1975   Date of Visit: 11/11/2019       Dear Dr. Wu Elaine:    Thank you for referring Paul Li to me for evaluation. Attached you will find relevant portions of my assessment and plan of care.    If you have questions, please do not hesitate to call me. I look forward to following Paul Li along with you.    Sincerely,    Tari Burgess, OD    Enclosure  CC:  No Recipients    If you would like to receive this communication electronically, please contact [email or web address] or [phone] to request more information on [facility] Link access.    For providers and/or their staff who would like to refer a patient to [facility], please contact us through our one-stop-shop provider referral line, [facility], at [phone].    If you feel you have received this communication in error or would no longer like to receive these types of communications, please e-mail [email or web address]

## 2019-11-13 ENCOUNTER — PATIENT MESSAGE (OUTPATIENT)
Dept: ENDOCRINOLOGY | Facility: HOSPITAL | Age: 44
End: 2019-11-13

## 2019-11-14 ENCOUNTER — HOSPITAL ENCOUNTER (OUTPATIENT)
Dept: RADIOLOGY | Facility: OTHER | Age: 44
Discharge: HOME | End: 2019-11-14
Attending: SURGERY
Payer: MEDICAID

## 2019-11-14 ENCOUNTER — ANESTHESIA EVENT (OUTPATIENT)
Dept: SURGERY | Facility: OTHER | Age: 44
End: 2019-11-14
Payer: MEDICAID

## 2019-11-14 ENCOUNTER — HOSPITAL ENCOUNTER (OUTPATIENT)
Dept: CARDIOLOGY | Facility: OTHER | Age: 44
Discharge: HOME | End: 2019-11-14
Attending: SURGERY
Payer: MEDICAID

## 2019-11-14 ENCOUNTER — HOSPITAL ENCOUNTER (OUTPATIENT)
Dept: PREADMISSION TESTING | Facility: OTHER | Age: 44
Discharge: HOME | End: 2019-11-14
Attending: SURGERY
Payer: MEDICAID

## 2019-11-14 VITALS
DIASTOLIC BLOOD PRESSURE: 65 MMHG | HEART RATE: 69 BPM | SYSTOLIC BLOOD PRESSURE: 129 MMHG | BODY MASS INDEX: 40.43 KG/M2 | OXYGEN SATURATION: 98 % | WEIGHT: 315 LBS | HEIGHT: 74 IN | TEMPERATURE: 98 F

## 2019-11-14 DIAGNOSIS — C50.921 MALIGNANT NEOPLASM OF RIGHT MALE BREAST, UNSPECIFIED ESTROGEN RECEPTOR STATUS, UNSPECIFIED SITE OF BREAST: ICD-10-CM

## 2019-11-14 DIAGNOSIS — Z01.818 PRE-OP TESTING: ICD-10-CM

## 2019-11-14 PROCEDURE — 93010 ELECTROCARDIOGRAM REPORT: CPT | Mod: ,,, | Performed by: INTERNAL MEDICINE

## 2019-11-14 PROCEDURE — 93005 ELECTROCARDIOGRAM TRACING: CPT

## 2019-11-14 PROCEDURE — 71046 XR CHEST PA AND LATERAL: ICD-10-PCS | Mod: 26,,, | Performed by: RADIOLOGY

## 2019-11-14 PROCEDURE — 71046 X-RAY EXAM CHEST 2 VIEWS: CPT | Mod: 26,,, | Performed by: RADIOLOGY

## 2019-11-14 PROCEDURE — 93010 EKG 12-LEAD: ICD-10-PCS | Mod: ,,, | Performed by: INTERNAL MEDICINE

## 2019-11-14 PROCEDURE — 71046 X-RAY EXAM CHEST 2 VIEWS: CPT | Mod: TC,FY

## 2019-11-14 RX ORDER — CELECOXIB 200 MG/1
400 CAPSULE ORAL
Status: CANCELLED | OUTPATIENT
Start: 2019-11-14 | End: 2019-11-14

## 2019-11-14 RX ORDER — LIDOCAINE HYDROCHLORIDE 10 MG/ML
0.5 INJECTION, SOLUTION EPIDURAL; INFILTRATION; INTRACAUDAL; PERINEURAL ONCE
Status: CANCELLED | OUTPATIENT
Start: 2019-11-14 | End: 2019-11-14

## 2019-11-14 RX ORDER — ACETAMINOPHEN 500 MG
1000 TABLET ORAL
Status: CANCELLED | OUTPATIENT
Start: 2019-11-14 | End: 2019-11-14

## 2019-11-14 RX ORDER — FAMOTIDINE 20 MG/1
20 TABLET, FILM COATED ORAL
Status: CANCELLED | OUTPATIENT
Start: 2019-11-14 | End: 2019-11-14

## 2019-11-14 RX ORDER — SODIUM CHLORIDE, SODIUM LACTATE, POTASSIUM CHLORIDE, CALCIUM CHLORIDE 600; 310; 30; 20 MG/100ML; MG/100ML; MG/100ML; MG/100ML
INJECTION, SOLUTION INTRAVENOUS CONTINUOUS
Status: CANCELLED | OUTPATIENT
Start: 2019-11-14

## 2019-11-14 NOTE — DISCHARGE INSTRUCTIONS
PRE-ADMIT TESTING -  [phone]    [address]  [place] [facility]          Your surgery has been scheduled at [facility]. We are pleased to have the opportunity to serve you. For Further Information please call [phone].    On the day of surgery please report to the Information Desk on the 1st floor.    · CONTACT YOUR PHYSICIAN'S OFFICE THE DAY PRIOR TO YOUR SURGERY TO OBTAIN YOUR ARRIVAL TIME.     · The evening before surgery do not eat anything after 9 p.m. ( this includes hard candy, chewing gum and mints).  You may only have GATORADE, POWERADE AND WATER  from 9 p.m. until you leave your home.   DO NOT DRINK ANY LIQUIDS ON THE WAY TO THE HOSPITAL.      SPECIAL MEDICATION INSTRUCTIONS: TAKE medications checked off by the Anesthesiologist on your Medication List.    Angiogram Patients: Take medications as instructed by your physician, including aspirin.     Surgery Patients:    If you take ASPIRIN - Your PHYSICIAN/SURGEON will need to inform you IF/OR when you need to stop taking aspirin prior to your surgery.     Do Not take any medications containing IBUPROFEN.  Do Not Wear any make-up or dark nail polish   (especially eye make-up) to surgery. If you come to surgery with makeup on you will be required to remove the makeup or nail polish.    Do not shave your surgical area at least 5 days prior to your surgery. The surgical prep will be performed at the hospital according to Infection Control regulations.    Leave all valuables at home.   Do Not wear any jewelry or watches, including any metal in body piercings. Jewelry must be removed prior to coming to the hospital.  There is a possibility that rings that are unable to be removed may be cut off if they are on the surgical extremity.    Contact Lens must be removed before surgery. Either do not wear the contact lens or bring a case and solution for storage.  Please bring a container for eyeglasses or dentures as required.  Bring  any paperwork your physician has provided, such as consent forms,  history and physicals, doctor's orders, etc.   Bring comfortable clothes that are loose fitting to wear upon discharge. Take into consideration the type of surgery being performed.  Maintain your diet as advised per your physician the day prior to surgery.      Adequate rest the night before surgery is advised.   Park in the Parking lot behind the hospital or in the [place] [facility] across the street from the parking lot. Parking is complimentary.  If you will be discharged the same day as your procedure, please arrange for a responsible adult to drive you home or to accompany you if traveling by taxi.   YOU WILL NOT BE PERMITTED TO DRIVE OR TO LEAVE THE HOSPITAL ALONE AFTER SURGERY.   It is strongly recommended that you arrange for someone to remain with you for the first 24 hrs following your surgery.    The Surgeon will speak to your family/visitor after your surgery regarding the outcome of your surgery and post op care.  The Surgeon may speak to you after your surgery, but there is a possibility you may not remember the details.  Please check with your family members regarding the conversation with the Surgeon.    We strongly recommend whoever is bringing you home be present for discharge instructions.  This will ensure a thorough understanding for your post op home care.    EACH PATIENT IS ALLOWED TWO FAMILY MEMBERS OR VISITORS IN THE ROOM AND IN THE WAITING ROOMS WHILE YOU ARE IN SURGERY. ALL CHILDREN MUST ALWAYS BE ACCOMPANIED BY AN ADULT.    Thank you for your cooperation.  The Staff of [facility].                Bathing Instructions with Hibiclens     Shower the evening before and morning of your procedure with Hibiclens:   Wash your face with water and your regular face wash/soap   Apply Hibiclens directly on your skin or on a wet washcloth and wash gently. When showering: Move away from the shower stream when  applying Hibiclens to avoid rinsing off too soon.   Rinse thoroughly with warm water   Do not dilute Hibiclens         Dry off as usual, do not use any deodorant, powder, body lotions, perfume, after shave or cologne.

## 2019-11-14 NOTE — PROGRESS NOTES
New Breast Cancer  History and Physical  [facility]  Department of Surgery    REFERRING PROVIDER: No referring provider defined for this encounter.    CHIEF COMPLAINT: right breast cancer    Subjective:      Paul Li Jr. is a 44 y.o.  male referred for evaluation of recently diagnosed carcinoma of the right breast. The patient was initially referred for surgical evaluation of a breast lump on exam first noted 1 month ago. Follow-up imaging showed mass at 12 o'clock retroareolar in the right breast. A ultrasound guided biopsy was performed on 5/2/2019 with pathology revealing infiltrating ductal carcinoma of the breast.     Patient does routinely do self breast exams.  Patient has noted a change on breast exam.  Patient denies nipple discharge. Patient denies to previous breast biopsy. Patient denies a personal history of breast cancer.    Findings at that time were the following:   Tumor size: 1.4 cm with nipple inversion  Tumor rdgrdrrdarddrderd:rd rd3rd Estrogen Receptor: pending   Progesterone Receptor: pending   Her-2 gerardo: 0, negative  Lymph node status: clinically negative     4 children, all sons.    INTERVAL HISTORY: completed neoadjuvant chemo with Dr. Bahena. Doing well. Last dose 10/8/2019.    FAMILY History:    Paternal GM breast cancer, 70s  Maternal GM cancer, GI cancer.  H/o ovarian mass (unsure if cancer)  Maternal GF throat cancer        Past Medical History:   Diagnosis Date    Arthritis     Diabetes mellitus     HTN (hypertension)     Leg edema, right     Prediabetes     Therapy     marital counseling     Past Surgical History:   Procedure Laterality Date    INSERTION OF TUNNELED CENTRAL VENOUS CATHETER (CVC) WITH SUBCUTANEOUS PORT Left 5/24/2019    Procedure: [id number];  Surgeon: Shima Whyte MD;  Location: [facility] OR [address] [place] [address];  Service: General;  Laterality: Left;     Current Outpatient Medications on File Prior to Visit   Medication Sig Dispense Refill    amLODIPine (NORVASC) 10 MG  tablet TAKE 1 TABLET (10 MG) BY MOUTH EVERY DAY 30 tablet 10    blood sugar diagnostic Strp Test 2 (two) times daily with meals. 100 strip 11    blood-glucose meter kit Use as instructed 1 each 0    lancets 33 gauge Misc Use 2 (two) times daily with meals. 100 each 11    lancing device Misc 1 Device by Misc.(Non-Drug; Combo Route) route 2 (two) times daily with meals. 1 each 0    lidocaine-prilocaine (EMLA) cream Apply topically to affected area 45 minutes before port access 30 g 1    ondansetron (ZOFRAN-ODT) 8 MG TbDL Take 1 tablet (8 mg total) by mouth every 12 (twelve) hours as needed. 20 tablet 1    senna (SENOKOT) 8.6 mg tablet Take 1 tablet by mouth once daily.      tadalafil (CIALIS) 5 MG tablet Take 2 tablets (10 mg total) by mouth daily as needed for Erectile Dysfunction. 20 tablet 1    blood sugar diagnostic Strp Use to test BG 4 times a day (one Touch Viero) 200 strip 3     No current facility-administered medications on file prior to visit.      Social History     Socioeconomic History    Marital status:      Spouse name: Not on file    Number of children: 3    Years of education: Not on file    Highest education level: Not on file   Occupational History    Occupation:    Social Needs    Financial resource strain: Not very hard    Food insecurity:     Worry: Never true     Inability: Never true    Transportation needs:     Medical: Yes     Non-medical: No   Tobacco Use    Smoking status: Former Smoker     Packs/day: 0.25     Years: 15.00     Pack years: 3.75     Types: Cigarettes     Last attempt to quit: 2013     Years since quittin.9    Smokeless tobacco: Never Used    Tobacco comment: Social smoker with alcohol    Substance and Sexual Activity    Alcohol use: Yes     Alcohol/week: 0.0 standard drinks     Frequency: Monthly or less     Drinks per session: 1 or 2     Binge frequency: Never     Comment: Rarely    Drug use: Never    Sexual activity: Yes  "    Partners: Female     Birth control/protection: None   Lifestyle    Physical activity:     Days per week: 0 days     Minutes per session: Not on file    Stress: Only a little   Relationships    Social connections:     Talks on phone: More than three times a week     Gets together: Patient refused     Attends Nondenominational service: Not on file     Active member of club or organization: No     Attends meetings of clubs or organizations: Never     Relationship status:    Other Topics Concern    Patient feels they ought to cut down on drinking/drug use Not Asked    Patient annoyed by others criticizing their drinking/drug use Not Asked    Patient has felt bad or guilty about drinking/drug use Not Asked    Patient has had a drink/used drugs as an eye opener in the AM Not Asked   Social History Narrative    From [place]    Lives with wife    3 sons in college    Wife recent loss of 6 month pregnancy (May 1, 2019)     Family History   Problem Relation Age of Onset    Hypertension Mother     Diabetes Mother     Hypertension Father     Lupus Father     Post-traumatic stress disorder Brother     No Known Problems Maternal Grandmother     Colon cancer Maternal Grandfather     Breast cancer Paternal Grandmother     No Known Problems Paternal Grandfather     No Known Problems Sister     No Known Problems Maternal Aunt     No Known Problems Maternal Uncle     Fibromyalgia Paternal Aunt     Lupus Paternal Aunt     No Known Problems Paternal Uncle     No Known Problems Brother     No Known Problems Sister     No Known Problems Son     No Known Problems Son     No Known Problems Son     No Known Problems Son         Review of Systems  Negative except above.     Objective:   PHYSICAL EXAM:  /75 (BP Location: Left arm, Patient Position: Sitting)   Pulse 85   Ht 6' 2" (1.88 m)   Wt (!) 189.3 kg (417 lb 5.3 oz)   BMI 53.58 kg/m²     Breasts: breasts appear normal, no suspicious masses, no " skin or nipple changes or axillary nodes.  Right nipple less thick.  No inversion, near normal appearance.      Radiology review: Images personally reviewed by me in the clinic.       Assessment:      Paul Li Jr. is a 44 y.o.  male with recently diagnosed carcinoma of the right breast.      Plan:    Options for management were discussed with the patient and his family. We reviewed the existing data noting the equivalency of breast conserving surgery with radiation therapy and mastectomy. We also reviewed the guidelines of the National Comprehensive Cancer Network for Stage 1 breast carcinoma. We discussed the need for lumpectomy margins to be negative for carcinoma, the necessity for postoperative radiation therapy after breast conservation in most cases, the possibility of a failed or false negative sentinel lymph node biopsy and the potential need for complete lymphadenectomy for a failed or positive sentinel lymph node biopsy were fully discussed. In the setting of mastectomy, delayed or immediate reconstruction options are available and were discussed.     Plan for RIGHT mastectomy with SLNB  in the next 4-6 weeks.

## 2019-11-14 NOTE — ANESTHESIA PREPROCEDURE EVALUATION
11/14/2019  Paul Li Jr. is a 44 y.o., male.    Anesthesia Evaluation    I have reviewed the Patient Summary Reports.    I have reviewed the Nursing Notes.   I have reviewed the Medications.     Review of Systems  Anesthesia Hx:  No problems with previous Anesthesia    Social:  Former Smoker, Social Alcohol Use    Hematology/Oncology:         -- Anemia: Current/Recent Cancer. Breast right chemotherapy   EENT/Dental:EENT/Dental Normal   Cardiovascular:   Hypertension    Pulmonary:  Pulmonary Normal    Renal/:   Hydrocele.   Hepatic/GI:  Hepatic/GI Normal    Musculoskeletal:   Arthritis     Neurological:   Neuromuscular Disease, Seizures, well controlled    Endocrine:   Diabetes, type 2, using insulin Gout.   Psych:   Psychiatric History anxiety depression          Physical Exam  General:  Morbid Obesity    Airway/Jaw/Neck:  Airway Findings: Mouth Opening: Normal Tongue: Large  General Airway Assessment: Adult, Possible difficult mask airway, Possible difficult intubation  Mallampati: II  TM Distance: Normal, at least 6 cm  Jaw/Neck Findings:  Neck ROM: Extension Decreased, Mild  Neck Findings:  Girth Increased      Dental:  Dental Findings: In tact        Mental Status:  Mental Status Findings:  Cooperative, Alert and Oriented         Anesthesia Plan  Type of Anesthesia, risks & benefits discussed:  Anesthesia Type:  general  Patient's Preference:   Intra-op Monitoring Plan: standard ASA monitors  Intra-op Monitoring Plan Comments:   Post Op Pain Control Plan: per primary service following discharge from PACU and multimodal analgesia  Post Op Pain Control Plan Comments:   Induction:    Beta Blocker:         Informed Consent: Patient understands risks and agrees with Anesthesia plan.  Questions answered. Anesthesia consent signed with patient.  ASA Score: 3     Day of Surgery Review of History & Physical:     H&P update referred to the surgeon.     Anesthesia Plan Notes: BMI-52!!! Labs in [facility] from 10/29/2019 are adequate for procedure. L arm devices.        Ready For Surgery From Anesthesia Perspective.

## 2019-11-15 ENCOUNTER — PATIENT MESSAGE (OUTPATIENT)
Dept: SURGERY | Facility: OTHER | Age: 44
End: 2019-11-15

## 2019-11-19 ENCOUNTER — PATIENT MESSAGE (OUTPATIENT)
Dept: HEMATOLOGY/ONCOLOGY | Facility: CLINIC | Age: 44
End: 2019-11-19

## 2019-11-20 ENCOUNTER — PATIENT MESSAGE (OUTPATIENT)
Dept: SURGERY | Facility: OTHER | Age: 44
End: 2019-11-20

## 2019-11-21 ENCOUNTER — TELEPHONE (OUTPATIENT)
Dept: SURGERY | Facility: CLINIC | Age: 44
End: 2019-11-21

## 2019-11-21 NOTE — TELEPHONE ENCOUNTER
Spoke to Patient.  Arrival time of 0800 given to check in at [facility].  Instructed that he can have clear liquids between [id number] and then to remain NPO after 0730, to wash up tonight and in the morning with an antibacterial soap, not to wear anything metal or to apply any lotions, powders, or deodorant, and to wear something easy to remove.  Patient verbalized understanding of instructions.

## 2019-11-22 ENCOUNTER — PATIENT MESSAGE (OUTPATIENT)
Dept: HEMATOLOGY/ONCOLOGY | Facility: CLINIC | Age: 44
End: 2019-11-22

## 2019-11-22 ENCOUNTER — ANESTHESIA (OUTPATIENT)
Dept: SURGERY | Facility: OTHER | Age: 44
End: 2019-11-22
Payer: MEDICAID

## 2019-11-22 ENCOUNTER — HOSPITAL ENCOUNTER (OUTPATIENT)
Facility: OTHER | Age: 44
Discharge: HOME | End: 2019-11-23
Attending: SURGERY | Admitting: SURGERY
Payer: MEDICAID

## 2019-11-22 ENCOUNTER — HOSPITAL ENCOUNTER (OUTPATIENT)
Dept: RADIOLOGY | Facility: OTHER | Age: 44
Discharge: HOME | End: 2019-11-22
Attending: SURGERY | Admitting: SURGERY
Payer: MEDICAID

## 2019-11-22 DIAGNOSIS — C50.929: ICD-10-CM

## 2019-11-22 DIAGNOSIS — Z17.1 MALIGNANT NEOPLASM INVOLVING BOTH NIPPLE AND AREOLA OF RIGHT BREAST IN MALE, ESTROGEN RECEPTOR NEGATIVE: Primary | ICD-10-CM

## 2019-11-22 DIAGNOSIS — C50.021 MALIGNANT NEOPLASM INVOLVING BOTH NIPPLE AND AREOLA OF RIGHT BREAST IN MALE, ESTROGEN RECEPTOR NEGATIVE: Primary | ICD-10-CM

## 2019-11-22 DIAGNOSIS — C50.921 MALIGNANT NEOPLASM OF RIGHT MALE BREAST, UNSPECIFIED ESTROGEN RECEPTOR STATUS, UNSPECIFIED SITE OF BREAST: ICD-10-CM

## 2019-11-22 LAB
POCT GLUCOSE: 100 MG/DL (ref 70–110)
POCT GLUCOSE: 105 MG/DL (ref 70–110)
POCT GLUCOSE: 112 MG/DL (ref 70–110)
POCT GLUCOSE: 139 MG/DL (ref 70–110)
POCT GLUCOSE: 92 MG/DL (ref 70–110)

## 2019-11-22 PROCEDURE — C9290 INJ, BUPIVACAINE LIPOSOME: HCPCS | Performed by: SURGERY

## 2019-11-22 PROCEDURE — 88342 CHG IMMUNOCYTOCHEMISTRY: ICD-10-PCS | Mod: 26,,, | Performed by: PATHOLOGY

## 2019-11-22 PROCEDURE — 82962 GLUCOSE BLOOD TEST: CPT | Performed by: SURGERY

## 2019-11-22 PROCEDURE — 63600175 PHARM REV CODE 636 W HCPCS: Performed by: SPECIALIST

## 2019-11-22 PROCEDURE — 25000003 PHARM REV CODE 250: Performed by: NURSE ANESTHETIST, CERTIFIED REGISTERED

## 2019-11-22 PROCEDURE — 88342 IMHCHEM/IMCYTCHM 1ST ANTB: CPT | Mod: 59 | Performed by: PATHOLOGY

## 2019-11-22 PROCEDURE — 88342 IMHCHEM/IMCYTCHM 1ST ANTB: CPT | Mod: 26,,, | Performed by: PATHOLOGY

## 2019-11-22 PROCEDURE — A9520 TC99 TILMANOCEPT DIAG 0.5MCI: HCPCS

## 2019-11-22 PROCEDURE — 63600175 PHARM REV CODE 636 W HCPCS: Performed by: SURGERY

## 2019-11-22 PROCEDURE — 36000706: Performed by: SURGERY

## 2019-11-22 PROCEDURE — 88307 TISSUE EXAM BY PATHOLOGIST: CPT | Mod: 26,,, | Performed by: PATHOLOGY

## 2019-11-22 PROCEDURE — 37000009 HC ANESTHESIA EA ADD 15 MINS: Performed by: SURGERY

## 2019-11-22 PROCEDURE — 25000003 PHARM REV CODE 250: Performed by: SURGERY

## 2019-11-22 PROCEDURE — 88331 PATH CONSLTJ SURG 1 BLK 1SPC: CPT | Mod: 26,,, | Performed by: PATHOLOGY

## 2019-11-22 PROCEDURE — 19303 PR MASTECTOMY, SIMPLE, COMPLETE: ICD-10-PCS | Mod: RT,,, | Performed by: SURGERY

## 2019-11-22 PROCEDURE — 38525 BIOPSY/REMOVAL LYMPH NODES: CPT | Mod: 51,RT,, | Performed by: SURGERY

## 2019-11-22 PROCEDURE — 88331 PATH CONSLTJ SURG 1 BLK 1SPC: CPT | Mod: 59 | Performed by: PATHOLOGY

## 2019-11-22 PROCEDURE — 88307 PR  SURG PATH,LEVEL V: ICD-10-PCS | Mod: 26,,, | Performed by: PATHOLOGY

## 2019-11-22 PROCEDURE — 01610 ANES ALL PX NRV MUSC SHO&AX: CPT | Performed by: SURGERY

## 2019-11-22 PROCEDURE — 38900 IO MAP OF SENT LYMPH NODE: CPT | Mod: RT,,, | Performed by: SURGERY

## 2019-11-22 PROCEDURE — 38525 PR BIOPSY/REM LYMPH NODES, AXILLARY: ICD-10-PCS | Mod: 51,RT,, | Performed by: SURGERY

## 2019-11-22 PROCEDURE — 37000008 HC ANESTHESIA 1ST 15 MINUTES: Performed by: SURGERY

## 2019-11-22 PROCEDURE — 27201423 OPTIME MED/SURG SUP & DEVICES STERILE SUPPLY: Performed by: SURGERY

## 2019-11-22 PROCEDURE — 38900 PR INTRAOPERATIVE SENTINEL LYMPH NODE ID W DYE INJECTION: ICD-10-PCS | Mod: RT,,, | Performed by: SURGERY

## 2019-11-22 PROCEDURE — 19303 MAST SIMPLE COMPLETE: CPT | Mod: RT,,, | Performed by: SURGERY

## 2019-11-22 PROCEDURE — 71000039 HC RECOVERY, EACH ADD'L HOUR: Performed by: SURGERY

## 2019-11-22 PROCEDURE — 88331 PR  PATH CONSULT IN SURG,W FRZ SEC: ICD-10-PCS | Mod: 26,,, | Performed by: PATHOLOGY

## 2019-11-22 PROCEDURE — 94761 N-INVAS EAR/PLS OXIMETRY MLT: CPT | Mod: 59

## 2019-11-22 PROCEDURE — 88341 IMHCHEM/IMCYTCHM EA ADD ANTB: CPT | Mod: 26,,, | Performed by: PATHOLOGY

## 2019-11-22 PROCEDURE — 88341 IMHCHEM/IMCYTCHM EA ADD ANTB: CPT | Performed by: PATHOLOGY

## 2019-11-22 PROCEDURE — 88307 TISSUE EXAM BY PATHOLOGIST: CPT | Performed by: PATHOLOGY

## 2019-11-22 PROCEDURE — 71000033 HC RECOVERY, INTIAL HOUR: Performed by: SURGERY

## 2019-11-22 PROCEDURE — 63600175 PHARM REV CODE 636 W HCPCS: Performed by: NURSE ANESTHETIST, CERTIFIED REGISTERED

## 2019-11-22 PROCEDURE — 88341 PR IHC OR ICC EACH ADD'L SINGLE ANTIBODY  STAINPR: ICD-10-PCS | Mod: 26,,, | Performed by: PATHOLOGY

## 2019-11-22 PROCEDURE — 25000003 PHARM REV CODE 250: Performed by: SPECIALIST

## 2019-11-22 PROCEDURE — 36000707: Performed by: SURGERY

## 2019-11-22 RX ORDER — AMLODIPINE BESYLATE 5 MG/1
10 TABLET ORAL DAILY
Status: DISCONTINUED | OUTPATIENT
Start: 2019-11-23 | End: 2019-11-23 | Stop reason: HOSPADM

## 2019-11-22 RX ORDER — SODIUM CHLORIDE, SODIUM LACTATE, POTASSIUM CHLORIDE, CALCIUM CHLORIDE 600; 310; 30; 20 MG/100ML; MG/100ML; MG/100ML; MG/100ML
INJECTION, SOLUTION INTRAVENOUS CONTINUOUS
Status: DISCONTINUED | OUTPATIENT
Start: 2019-11-22 | End: 2019-11-22

## 2019-11-22 RX ORDER — ONDANSETRON 2 MG/ML
INJECTION INTRAMUSCULAR; INTRAVENOUS
Status: DISCONTINUED | OUTPATIENT
Start: 2019-11-22 | End: 2019-11-22

## 2019-11-22 RX ORDER — GLUCAGON 1 MG
1 KIT INJECTION
Status: DISCONTINUED | OUTPATIENT
Start: 2019-11-22 | End: 2019-11-23 | Stop reason: HOSPADM

## 2019-11-22 RX ORDER — MUPIROCIN 20 MG/G
1 OINTMENT TOPICAL 2 TIMES DAILY
Status: DISCONTINUED | OUTPATIENT
Start: 2019-11-22 | End: 2019-11-23 | Stop reason: HOSPADM

## 2019-11-22 RX ORDER — OXYCODONE HYDROCHLORIDE 5 MG/1
5 TABLET ORAL
Status: DISCONTINUED | OUTPATIENT
Start: 2019-11-22 | End: 2019-11-22 | Stop reason: HOSPADM

## 2019-11-22 RX ORDER — ROCURONIUM BROMIDE 10 MG/ML
INJECTION, SOLUTION INTRAVENOUS
Status: DISCONTINUED | OUTPATIENT
Start: 2019-11-22 | End: 2019-11-22

## 2019-11-22 RX ORDER — ONDANSETRON 8 MG/1
8 TABLET, ORALLY DISINTEGRATING ORAL EVERY 8 HOURS PRN
Status: DISCONTINUED | OUTPATIENT
Start: 2019-11-22 | End: 2019-11-23 | Stop reason: HOSPADM

## 2019-11-22 RX ORDER — HYDROCHLOROTHIAZIDE 25 MG/1
25 TABLET ORAL DAILY
Status: DISCONTINUED | OUTPATIENT
Start: 2019-11-23 | End: 2019-11-23 | Stop reason: HOSPADM

## 2019-11-22 RX ORDER — PROPOFOL 10 MG/ML
VIAL (ML) INTRAVENOUS
Status: DISCONTINUED | OUTPATIENT
Start: 2019-11-22 | End: 2019-11-22

## 2019-11-22 RX ORDER — CEFAZOLIN SODIUM 1 G/3ML
2 INJECTION, POWDER, FOR SOLUTION INTRAMUSCULAR; INTRAVENOUS
Status: COMPLETED | OUTPATIENT
Start: 2019-11-22 | End: 2019-11-22

## 2019-11-22 RX ORDER — PHENYLEPHRINE HYDROCHLORIDE 10 MG/ML
INJECTION INTRAVENOUS
Status: DISCONTINUED | OUTPATIENT
Start: 2019-11-22 | End: 2019-11-22

## 2019-11-22 RX ORDER — CELECOXIB 200 MG/1
400 CAPSULE ORAL
Status: COMPLETED | OUTPATIENT
Start: 2019-11-22 | End: 2019-11-22

## 2019-11-22 RX ORDER — ISOSULFAN BLUE 50 MG/5ML
INJECTION, SOLUTION SUBCUTANEOUS
Status: DISCONTINUED | OUTPATIENT
Start: 2019-11-22 | End: 2019-11-22 | Stop reason: HOSPADM

## 2019-11-22 RX ORDER — ACETAMINOPHEN 500 MG
1000 TABLET ORAL
Status: COMPLETED | OUTPATIENT
Start: 2019-11-22 | End: 2019-11-22

## 2019-11-22 RX ORDER — LIDOCAINE HCL/PF 100 MG/5ML
SYRINGE (ML) INTRAVENOUS
Status: DISCONTINUED | OUTPATIENT
Start: 2019-11-22 | End: 2019-11-22

## 2019-11-22 RX ORDER — LIDOCAINE HYDROCHLORIDE 10 MG/ML
1 INJECTION, SOLUTION EPIDURAL; INFILTRATION; INTRACAUDAL; PERINEURAL ONCE
Status: DISCONTINUED | OUTPATIENT
Start: 2019-11-22 | End: 2019-11-22

## 2019-11-22 RX ORDER — FAMOTIDINE 20 MG/1
20 TABLET, FILM COATED ORAL
Status: COMPLETED | OUTPATIENT
Start: 2019-11-22 | End: 2019-11-22

## 2019-11-22 RX ORDER — SENNOSIDES 8.6 MG/1
1 TABLET ORAL DAILY
Status: DISCONTINUED | OUTPATIENT
Start: 2019-11-22 | End: 2019-11-23 | Stop reason: HOSPADM

## 2019-11-22 RX ORDER — SODIUM CHLORIDE 9 MG/ML
INJECTION, SOLUTION INTRAVENOUS CONTINUOUS
Status: DISCONTINUED | OUTPATIENT
Start: 2019-11-22 | End: 2019-11-22

## 2019-11-22 RX ORDER — GABAPENTIN 300 MG/1
300 CAPSULE ORAL 3 TIMES DAILY
Status: DISCONTINUED | OUTPATIENT
Start: 2019-11-22 | End: 2019-11-23 | Stop reason: HOSPADM

## 2019-11-22 RX ORDER — LOSARTAN POTASSIUM 50 MG/1
100 TABLET ORAL DAILY
Status: DISCONTINUED | OUTPATIENT
Start: 2019-11-23 | End: 2019-11-23 | Stop reason: HOSPADM

## 2019-11-22 RX ORDER — IBUPROFEN 200 MG
24 TABLET ORAL
Status: DISCONTINUED | OUTPATIENT
Start: 2019-11-22 | End: 2019-11-23 | Stop reason: HOSPADM

## 2019-11-22 RX ORDER — FENTANYL CITRATE 50 UG/ML
INJECTION, SOLUTION INTRAMUSCULAR; INTRAVENOUS
Status: DISCONTINUED | OUTPATIENT
Start: 2019-11-22 | End: 2019-11-22

## 2019-11-22 RX ORDER — IBUPROFEN 200 MG
16 TABLET ORAL
Status: DISCONTINUED | OUTPATIENT
Start: 2019-11-22 | End: 2019-11-23 | Stop reason: HOSPADM

## 2019-11-22 RX ORDER — ATORVASTATIN CALCIUM 20 MG/1
20 TABLET, FILM COATED ORAL DAILY
Status: DISCONTINUED | OUTPATIENT
Start: 2019-11-22 | End: 2019-11-23 | Stop reason: HOSPADM

## 2019-11-22 RX ORDER — ONDANSETRON 2 MG/ML
4 INJECTION INTRAMUSCULAR; INTRAVENOUS DAILY PRN
Status: DISCONTINUED | OUTPATIENT
Start: 2019-11-22 | End: 2019-11-22 | Stop reason: HOSPADM

## 2019-11-22 RX ORDER — MEPERIDINE HYDROCHLORIDE 25 MG/ML
12.5 INJECTION INTRAMUSCULAR; INTRAVENOUS; SUBCUTANEOUS ONCE AS NEEDED
Status: DISCONTINUED | OUTPATIENT
Start: 2019-11-22 | End: 2019-11-22 | Stop reason: HOSPADM

## 2019-11-22 RX ORDER — SUCCINYLCHOLINE CHLORIDE 20 MG/ML
INJECTION INTRAMUSCULAR; INTRAVENOUS
Status: DISCONTINUED | OUTPATIENT
Start: 2019-11-22 | End: 2019-11-22

## 2019-11-22 RX ORDER — HYDROCODONE BITARTRATE AND ACETAMINOPHEN 5; 325 MG/1; MG/1
1 TABLET ORAL EVERY 4 HOURS PRN
Status: DISCONTINUED | OUTPATIENT
Start: 2019-11-22 | End: 2019-11-23 | Stop reason: HOSPADM

## 2019-11-22 RX ORDER — HYDROMORPHONE HYDROCHLORIDE 2 MG/ML
0.4 INJECTION, SOLUTION INTRAMUSCULAR; INTRAVENOUS; SUBCUTANEOUS EVERY 5 MIN PRN
Status: DISCONTINUED | OUTPATIENT
Start: 2019-11-22 | End: 2019-11-22 | Stop reason: HOSPADM

## 2019-11-22 RX ORDER — SODIUM CHLORIDE 0.9 % (FLUSH) 0.9 %
3 SYRINGE (ML) INJECTION
Status: DISCONTINUED | OUTPATIENT
Start: 2019-11-22 | End: 2019-11-22

## 2019-11-22 RX ORDER — DIPHENHYDRAMINE HYDROCHLORIDE 50 MG/ML
25 INJECTION INTRAMUSCULAR; INTRAVENOUS EVERY 6 HOURS PRN
Status: DISCONTINUED | OUTPATIENT
Start: 2019-11-22 | End: 2019-11-22 | Stop reason: HOSPADM

## 2019-11-22 RX ORDER — LIDOCAINE HYDROCHLORIDE 10 MG/ML
0.5 INJECTION, SOLUTION EPIDURAL; INFILTRATION; INTRACAUDAL; PERINEURAL ONCE
Status: DISCONTINUED | OUTPATIENT
Start: 2019-11-22 | End: 2019-11-22 | Stop reason: HOSPADM

## 2019-11-22 RX ADMIN — ROCURONIUM BROMIDE 10 MG: 10 INJECTION, SOLUTION INTRAVENOUS at 09:11

## 2019-11-22 RX ADMIN — SODIUM CHLORIDE, SODIUM LACTATE, POTASSIUM CHLORIDE, AND CALCIUM CHLORIDE: 600; 310; 30; 20 INJECTION, SOLUTION INTRAVENOUS at 10:11

## 2019-11-22 RX ADMIN — GABAPENTIN 300 MG: 300 CAPSULE ORAL at 08:11

## 2019-11-22 RX ADMIN — HYDROMORPHONE HYDROCHLORIDE 0.4 MG: 2 INJECTION, SOLUTION INTRAMUSCULAR; INTRAVENOUS; SUBCUTANEOUS at 01:11

## 2019-11-22 RX ADMIN — PHENYLEPHRINE HYDROCHLORIDE 100 MCG: 10 INJECTION INTRAVENOUS at 10:11

## 2019-11-22 RX ADMIN — ONDANSETRON 4 MG: 2 INJECTION INTRAMUSCULAR; INTRAVENOUS at 10:11

## 2019-11-22 RX ADMIN — CEFAZOLIN 3 G: 330 INJECTION, POWDER, FOR SOLUTION INTRAMUSCULAR; INTRAVENOUS at 09:11

## 2019-11-22 RX ADMIN — PHENYLEPHRINE HYDROCHLORIDE 200 MCG: 10 INJECTION INTRAVENOUS at 10:11

## 2019-11-22 RX ADMIN — FENTANYL CITRATE 100 MCG: 50 INJECTION, SOLUTION INTRAMUSCULAR; INTRAVENOUS at 09:11

## 2019-11-22 RX ADMIN — FAMOTIDINE 20 MG: 20 TABLET ORAL at 08:11

## 2019-11-22 RX ADMIN — ACETAMINOPHEN 1000 MG: 500 TABLET, FILM COATED ORAL at 08:11

## 2019-11-22 RX ADMIN — HYDROMORPHONE HYDROCHLORIDE 0.4 MG: 2 INJECTION, SOLUTION INTRAMUSCULAR; INTRAVENOUS; SUBCUTANEOUS at 12:11

## 2019-11-22 RX ADMIN — HYDROCODONE BITARTRATE AND ACETAMINOPHEN 1 TABLET: 5; 325 TABLET ORAL at 08:11

## 2019-11-22 RX ADMIN — SODIUM CHLORIDE, SODIUM LACTATE, POTASSIUM CHLORIDE, AND CALCIUM CHLORIDE: 600; 310; 30; 20 INJECTION, SOLUTION INTRAVENOUS at 09:11

## 2019-11-22 RX ADMIN — SUCCINYLCHOLINE CHLORIDE 180 MG: 20 INJECTION, SOLUTION INTRAMUSCULAR; INTRAVENOUS at 09:11

## 2019-11-22 RX ADMIN — MUPIROCIN 1 G: 20 OINTMENT TOPICAL at 08:11

## 2019-11-22 RX ADMIN — LIDOCAINE HYDROCHLORIDE 100 MG: 20 INJECTION, SOLUTION INTRAVENOUS at 09:11

## 2019-11-22 RX ADMIN — GABAPENTIN 300 MG: 300 CAPSULE ORAL at 03:11

## 2019-11-22 RX ADMIN — PROPOFOL 300 MG: 10 INJECTION, EMULSION INTRAVENOUS at 09:11

## 2019-11-22 RX ADMIN — CELECOXIB 400 MG: 200 CAPSULE ORAL at 08:11

## 2019-11-22 RX ADMIN — OXYCODONE HYDROCHLORIDE 5 MG: 5 TABLET ORAL at 12:11

## 2019-11-22 NOTE — H&P
New Breast Cancer  History and Physical  [facility]  Department of Surgery     REFERRING PROVIDER: No referring provider defined for this encounter.     CHIEF COMPLAINT: right breast cancer     Subjective:       Paul Li Jr. is a 44 y.o.  male referred for evaluation of recently diagnosed carcinoma of the right breast. The patient was initially referred for surgical evaluation of a breast lump on exam first noted 1 month ago. Follow-up imaging showed mass at 12 o'clock retroareolar in the right breast. A ultrasound guided biopsy was performed on 5/2/2019 with pathology revealing infiltrating ductal carcinoma of the breast.      Patient does routinely do self breast exams.  Patient has noted a change on breast exam.  Patient denies nipple discharge. Patient denies to previous breast biopsy. Patient denies a personal history of breast cancer.     Findings at that time were the following:   Tumor size: 1.4 cm with nipple inversion  Tumor rdgrdrrdarddrderd:rd rd3rd Estrogen Receptor: pending   Progesterone Receptor: pending   Her-2 gerardo: 0, negative  Lymph node status: clinically negative      4 children, all sons.     INTERVAL HISTORY: completed neoadjuvant chemo with Dr. Bahena. Doing well. Last dose 10/8/2019.     FAMILY History:     Paternal GM breast cancer, 70s  Maternal GM cancer, GI cancer.  H/o ovarian mass (unsure if cancer)  Maternal GF throat cancer                Past Medical History:   Diagnosis Date    Arthritis      Diabetes mellitus      HTN (hypertension)      Leg edema, right      Prediabetes      Therapy       marital counseling            Past Surgical History:   Procedure Laterality Date    INSERTION OF TUNNELED CENTRAL VENOUS CATHETER (CVC) WITH SUBCUTANEOUS PORT Left 5/24/2019     Procedure: [id number];  Surgeon: Shima Whyte MD;  Location: [facility] OR [address] [place] [address];  Service: General;  Laterality: Left;             Current Outpatient Medications on File Prior to Visit   Medication Sig  Dispense Refill    amLODIPine (NORVASC) 10 MG tablet TAKE 1 TABLET (10 MG) BY MOUTH EVERY DAY 30 tablet 10    blood sugar diagnostic Strp Test 2 (two) times daily with meals. 100 strip 11    blood-glucose meter kit Use as instructed 1 each 0    lancets 33 gauge Misc Use 2 (two) times daily with meals. 100 each 11    lancing device Misc 1 Device by Misc.(Non-Drug; Combo Route) route 2 (two) times daily with meals. 1 each 0    lidocaine-prilocaine (EMLA) cream Apply topically to affected area 45 minutes before port access 30 g 1    ondansetron (ZOFRAN-ODT) 8 MG TbDL Take 1 tablet (8 mg total) by mouth every 12 (twelve) hours as needed. 20 tablet 1    senna (SENOKOT) 8.6 mg tablet Take 1 tablet by mouth once daily.        tadalafil (CIALIS) 5 MG tablet Take 2 tablets (10 mg total) by mouth daily as needed for Erectile Dysfunction. 20 tablet 1    blood sugar diagnostic Strp Use to test BG 4 times a day (one Touch Viero) 200 strip 3      No current facility-administered medications on file prior to visit.       Social History            Socioeconomic History    Marital status:        Spouse name: Not on file    Number of children: 3    Years of education: Not on file    Highest education level: Not on file   Occupational History    Occupation:    Social Needs    Financial resource strain: Not very hard    Food insecurity:       Worry: Never true       Inability: Never true    Transportation needs:       Medical: Yes       Non-medical: No   Tobacco Use    Smoking status: Former Smoker       Packs/day: 0.25       Years: 15.00       Pack years: 3.75       Types: Cigarettes       Last attempt to quit: 2013       Years since quittin.9    Smokeless tobacco: Never Used    Tobacco comment: Social smoker with alcohol    Substance and Sexual Activity    Alcohol use: Yes       Alcohol/week: 0.0 standard drinks       Frequency: Monthly or less       Drinks per session: 1 or 2        Binge frequency: Never       Comment: Rarely    Drug use: Never    Sexual activity: Yes       Partners: Female       Birth control/protection: None   Lifestyle    Physical activity:       Days per week: 0 days       Minutes per session: Not on file    Stress: Only a little   Relationships    Social connections:       Talks on phone: More than three times a week       Gets together: Patient refused       Attends Latter day service: Not on file       Active member of club or organization: No       Attends meetings of clubs or organizations: Never       Relationship status:    Other Topics Concern    Patient feels they ought to cut down on drinking/drug use Not Asked    Patient annoyed by others criticizing their drinking/drug use Not Asked    Patient has felt bad or guilty about drinking/drug use Not Asked    Patient has had a drink/used drugs as an eye opener in the AM Not Asked   Social History Narrative     From [place]     Lives with wife     3 sons in college     Wife recent loss of 6 month pregnancy (May 1, 2019)            Family History   Problem Relation Age of Onset    Hypertension Mother      Diabetes Mother      Hypertension Father      Lupus Father      Post-traumatic stress disorder Brother      No Known Problems Maternal Grandmother      Colon cancer Maternal Grandfather      Breast cancer Paternal Grandmother      No Known Problems Paternal Grandfather      No Known Problems Sister      No Known Problems Maternal Aunt      No Known Problems Maternal Uncle      Fibromyalgia Paternal Aunt      Lupus Paternal Aunt      No Known Problems Paternal Uncle      No Known Problems Brother      No Known Problems Sister      No Known Problems Son      No Known Problems Son      No Known Problems Son      No Known Problems Son           Review of Systems  Negative except above.     Objective:   PHYSICAL EXAM:  /75 (BP Location: Left arm, Patient Position: Sitting)    "Pulse 85   Ht 6' 2" (1.88 m)   Wt (!) 189.3 kg (417 lb 5.3 oz)   BMI 53.58 kg/m²      Breasts: breasts appear normal, no suspicious masses, no skin or nipple changes or axillary nodes.  Right nipple less thick.  No inversion, near normal appearance.        Radiology review: Images personally reviewed by me in the clinic.         Assessment:       Paul Li Jr. is a 44 y.o.  male with recently diagnosed carcinoma of the right breast.      Plan:    Options for management were discussed with the patient and his family. We reviewed the existing data noting the equivalency of breast conserving surgery with radiation therapy and mastectomy. We also reviewed the guidelines of the National Comprehensive Cancer Network for Stage 1 breast carcinoma. We discussed the need for lumpectomy margins to be negative for carcinoma, the necessity for postoperative radiation therapy after breast conservation in most cases, the possibility of a failed or false negative sentinel lymph node biopsy and the potential need for complete lymphadenectomy for a failed or positive sentinel lymph node biopsy were fully discussed. In the setting of mastectomy, delayed or immediate reconstruction options are available and were discussed.      Plan for RIGHT mastectomy with SLNB  in the next 4-6 weeks.    "

## 2019-11-22 NOTE — ANESTHESIA POSTPROCEDURE EVALUATION
Anesthesia Post Evaluation    Patient: Paul Li Jr.    Procedure(s) Performed: Procedure(s) (LRB):  MASTECTOMY, SIMPLE RIGHT (CONSENT AM OF) 2.5 hr case (Right)  BIOPSY, LYMPH NODE, SENTINEL RIGHT (Right)  LYMPHADENECTOMY, AXILLARY RIGHT (Right)    Final Anesthesia Type: general    Patient location during evaluation: PACU  Patient participation: Yes- Able to Participate  Level of consciousness: awake and alert  Post-procedure vital signs: reviewed and stable  Pain management: adequate  Airway patency: patent    PONV status at discharge: No PONV  Anesthetic complications: no      Cardiovascular status: blood pressure returned to baseline and stable  Respiratory status: unassisted, spontaneous ventilation and nasal cannula  Hydration status: euvolemic  Follow-up not needed.          Vitals Value Taken Time   /62 11/22/2019  2:29 PM   Temp 36.6 °C (97.9 °F) 11/22/2019  2:00 PM   Pulse 73 11/22/2019  2:40 PM   Resp 20 11/22/2019  1:45 PM   SpO2 100 % 11/22/2019  2:40 PM   Vitals shown include unvalidated device data.      No case tracking events are documented in the log.      Pain/Reji Score: Pain Rating Prior to Med Admin: 5 (11/22/2019  1:31 PM)  Pain Rating Post Med Admin: 4 (11/22/2019  2:03 PM)  Reji Score: 8 (11/22/2019  2:03 PM)

## 2019-11-22 NOTE — INTERVAL H&P NOTE
The patient has been examined and the H&P has been reviewed:    I concur with the findings and no changes have occurred since H&P was written.    Anesthesia/Surgery risks, benefits and alternative options discussed and understood by patient/family.          Active Hospital Problems    Diagnosis  POA    Male breast cancer [C50.929]  Yes      Resolved Hospital Problems   No resolved problems to display.

## 2019-11-22 NOTE — OR NURSING
Transferred from stretcher to pt bed for comfort.  Both arms elevated on pillows.  C/o pain to rt breast and rates pain a 7.

## 2019-11-22 NOTE — TRANSFER OF CARE
"Anesthesia Transfer of Care Note    Patient: Paul Li Jr.    Procedure(s) Performed: Procedure(s) (LRB):  MASTECTOMY, SIMPLE RIGHT (CONSENT AM OF) 2.5 hr case (Right)  BIOPSY, LYMPH NODE, SENTINEL RIGHT (Right)  LYMPHADENECTOMY, AXILLARY RIGHT (Right)    Patient location: PACU    Anesthesia Type: general    Transport from OR: Transported from OR on 2-3 L/min O2 by NC with adequate spontaneous ventilation    Post pain: adequate analgesia    Post assessment: no apparent anesthetic complications    Post vital signs: stable    Level of consciousness: awake, alert and oriented    Nausea/Vomiting: no nausea/vomiting    Complications: none          Last vitals:   Visit Vitals  BP (!) 141/74 (BP Location: Right arm, Patient Position: Lying)   Pulse 68   Temp 36.6 °C (97.9 °F) (Oral)   Resp 16   Ht 6' 2" (1.88 m)   Wt (!) 183.7 kg (404 lb 15.8 oz)   SpO2 95%   BMI 52.00 kg/m²     "

## 2019-11-22 NOTE — OP NOTE
Operative Note     11/22/2019    PRE-OP DIAGNOSIS: Malignant neoplasm of right male breast, unspecified estrogen receptor status, unspecified site of breast [C50.921]      POST-OP DIAGNOSIS: Post-Op Diagnosis Codes:     * Malignant neoplasm of right male breast, unspecified estrogen receptor status, unspecified site of breast [C50.921]    Procedure(s):  MASTECTOMY, SIMPLE RIGHT   BIOPSY, LYMPH NODE, SENTINEL RIGHT axillary  ID of SLN  Injection of radioactive technetium and blue dye for SLN     SURGEON: Surgeon(s) and Role:     * Shima Whyte MD - Primary    ANESTHESIA: General     OPERATIVE FINDINGS: 4 LN.  #1 count 25 and blue. # 2 blue count 5. #3 and #4 blue only.  All negative on frozen section.    INDICATION FOR PROCEDURE: This patient presents with a history of cancer of the right breast    PROCEDURE IN DETAIL:  Paul Li Jr. is a 44 y.o. male brought to the operating room for definitive surgery of cancer of the right breast.  The patient has elected to undergo right simple mastectomy with sentinel lymph node biopsy for derick assessment. The patient was informed of the possible risks and complications of the procedure, including but not limited to anesthetic risks, bleeding, infection, and need for additional surgery.  The patient concurred with the proposed plan, and has given informed consent.  The site of surgery was properly noted/marked in the preoperative holding area.    Prior to arriving in the operating room, the patient's right breast was injected with technetium to facilitate sentinel lymph node identification. The patient was then brought to the operating room and placed in the supine position with both upper extremities extended.  general anesthesia was administered. Perioperative antibiotics were administered consisting of Ancef and a time out was performed confirming the patient, site, and procedure.  The right chest and axilla was then prepped and draped in the usual sterile  fashion.    We then turned our attention to the right breast where an elliptical incision was fashioned to incorporate the nipple areolar complex.  The incision was made with a 10-blade and extended through the subcutaneous tissues with Bovie electrocautery.  Skin flaps were raised to the clavicle superiorly.  We then  turned our attention to the right axilla.  Blue dye was injected prior to the incision in the usual subareolar fashion. The gamma probe was used to identify an area of increased radioactivity within the lower axilla. The clavipectoral sheath was sharply incised to reveal the level I axillary lymph nodes. The probe was used to identify a single node with increased radioactivity.  This node was brought into the operative field and carefully dissected free of the surrounding lymphovascular structures.  The highest ex vivo count of the node was 25.  The node was then sent to pathology for frozen section evaluation, labeled as sentinel node #1.  A total of 4 axillary sentinel nodes and 0 axillary non-sentinel nodes were identified, excised and submitted to pathology.  Bed counts were obtained to confirm that the 10% rule had not been violated.   The wound was irrigated with normal saline, and all bleeding points were secured with Bovie electrocautery.     We then proceeded to raise the remainder of the flaps to the lateral border of the sternum medially, to the inframammary fold inferiorly, and to the anterior border of the latissimus dorsi muscle laterally. The breast tissue was sharply excised off the chest wall taking care to incorporate the pectoralis fascia while leaving the serratus fascia behind.  The resulting mastectomy specimen was marked using a short stitch superiorly and long stitch laterally.  The breast was sent to pathology for permanent evaluation.      Frozen section derick evaluation revealed no evidence of metastatic disease.  Therefore, the operative field was irrigated with normal  saline and all bleeding points were secured with Bovie electrocautery.  2-15 Fr michael drains were placed under the mastectomy flap. The incision was closed using an interrupted 3-0 vicryl deep dermal stitch followed by a running 4-0 monocryl subcuticular.      Dermabond was applied. A post surgical bra was placed on the patient. At the end of the operation, all sponge, instrument, and needle counts x 2 were correct.    ESTIMATED BLOOD LOSS: Minimal    COMPLICATIONS: none    DISPOSITION: PACU - hemodynamically stable.    ATTESTATION:   I performed the procedure.

## 2019-11-23 VITALS
TEMPERATURE: 98 F | BODY MASS INDEX: 40.43 KG/M2 | SYSTOLIC BLOOD PRESSURE: 121 MMHG | RESPIRATION RATE: 12 BRPM | OXYGEN SATURATION: 95 % | WEIGHT: 315 LBS | DIASTOLIC BLOOD PRESSURE: 58 MMHG | HEART RATE: 73 BPM | HEIGHT: 74 IN

## 2019-11-23 LAB — POCT GLUCOSE: 127 MG/DL (ref 70–110)

## 2019-11-23 PROCEDURE — 94761 N-INVAS EAR/PLS OXIMETRY MLT: CPT

## 2019-11-23 PROCEDURE — 25000003 PHARM REV CODE 250: Performed by: SURGERY

## 2019-11-23 RX ORDER — OXYCODONE AND ACETAMINOPHEN 5; 325 MG/1; MG/1
1 TABLET ORAL EVERY 4 HOURS PRN
Qty: 40 TABLET | Refills: 0 | Status: SHIPPED | OUTPATIENT
Start: 2019-11-23 | End: 2020-06-16

## 2019-11-23 RX ADMIN — LOSARTAN POTASSIUM 100 MG: 50 TABLET ORAL at 08:11

## 2019-11-23 RX ADMIN — MUPIROCIN 1 G: 20 OINTMENT TOPICAL at 08:11

## 2019-11-23 RX ADMIN — AMLODIPINE BESYLATE 10 MG: 5 TABLET ORAL at 08:11

## 2019-11-23 RX ADMIN — SENNOSIDES 1 TABLET: 8.6 TABLET, FILM COATED ORAL at 08:11

## 2019-11-23 RX ADMIN — HYDROCODONE BITARTRATE AND ACETAMINOPHEN 1 TABLET: 5; 325 TABLET ORAL at 07:11

## 2019-11-23 RX ADMIN — GABAPENTIN 300 MG: 300 CAPSULE ORAL at 08:11

## 2019-11-23 RX ADMIN — ATORVASTATIN CALCIUM 20 MG: 20 TABLET, FILM COATED ORAL at 08:11

## 2019-11-23 RX ADMIN — HYDROCHLOROTHIAZIDE 25 MG: 25 TABLET ORAL at 08:11

## 2019-11-23 NOTE — DISCHARGE SUMMARY
VSS. Pt eager & in agreement w/ DC. VU of DC instructions--paperwork explained. & pain prescription sent to [facility] pharmacy. Drain care instructions performed. Teach back performed by spouse. Additional drain care supplies provided. IV removed w/ cath tip intact, WNL. Voiding, ambulating, & tolerating PO well. R breast transverse Incision WNL. Pt. DCd home w/ family--escorted downstairs via  transport team. Free from falls, injury, or skin breakdown this hospital admission. Pt. In NAD.

## 2019-11-23 NOTE — PROGRESS NOTES
AAOX4. VSS.Pt free of trauma, falls, injury. Thorough skin assessment performed. No skin breakdown noted. Drain care performed. BG monitored. Pt denies pain at this time. Pt has been eating and voiding adequately throughout shift. Pt repositions self w/ assisstance. Purposeful hourly rounding. Pt has call light in reach, side rails up X2, bed in low position and TEDS /SCDS/nonskid socks on. Pt lying in bed in no distress w/ family at bedside.

## 2019-11-23 NOTE — NURSING
Patient remains awake and alert. RR even, unlabored on RA. Provided prn norco for 5/10 surgical pain. Will assess med effectiveness. Right breast surgical site remains C/D/I with KIKE drain x2 recharged after emptying 15ml and 20ml. Blood sugar at this time is 105 and no SSI needed. Patient updated on plan of care with family at bedside. 3/4 rails up. Bed alarm on with call light and urinal in reach

## 2019-11-23 NOTE — NURSING
Patient ambulated back to bathroom following urination. States no pain nor discomfort noted. KIKE drains emptied and recorder per patient chart. Dressing remains C/D/I to right chest surgical site.

## 2019-11-23 NOTE — PLAN OF CARE
Problem: Adult Inpatient Plan of Care  Goal: Plan of Care Review  Outcome: Ongoing, Progressing  Goal: Patient-Specific Goal (Individualization)  Outcome: Ongoing, Progressing  Goal: Absence of Hospital-Acquired Illness or Injury  Outcome: Ongoing, Progressing  Goal: Optimal Comfort and Wellbeing  Outcome: Ongoing, Progressing  Goal: Readiness for Transition of Care  Outcome: Ongoing, Progressing  Goal: Rounds/Family Conference  Outcome: Ongoing, Progressing     Problem: Diabetes Comorbidity  Goal: Blood Glucose Level Within Desired Range  Outcome: Ongoing, Progressing  Intervention: Maintain Glycemic Control  Flowsheets (Taken 11/23/2019 0536)  Glycemic Management: supplemental insulin given; blood glucose monitoring     Problem: Infection  Goal: Infection Symptom Resolution  Outcome: Ongoing, Progressing  Intervention: Prevent or Manage Infection  Flowsheets (Taken 11/23/2019 0536)  Isolation Precautions: other (see comments) (bactroban provided per md order)     Problem: Fall Injury Risk  Goal: Absence of Fall and Fall-Related Injury  Outcome: Ongoing, Progressing  Intervention: Identify and Manage Contributors to Fall Injury Risk  Flowsheets (Taken 11/23/2019 0536)  Self-Care Promotion: independence encouraged; BADL personal objects within reach; BADL personal routines maintained  Medication Review/Management: medications reviewed; high risk medications identified     Patient remained involved with plan of care throughout entire shift . Hourly rounding implemented to ensure patient safety/comfort. Blood sugar was monitored and no SSI was needed. Fall precautions were maintained including bed alarm on, 3/4 side rails up, call light and urinal in reach to ensure patient safety.

## 2019-11-23 NOTE — DISCHARGE SUMMARY
"[facility]  Discharge Summary  General Surgery      Admit Date: 11/22/2019    Discharge Date and Time:  11/23/2019 8:41 AM    Attending Physician: Shima Whyte MD     Discharge Provider: Shima Whyte    Reason for Admission: surgery    Procedures Performed: Procedure(s) (LRB):  MASTECTOMY, SIMPLE RIGHT (CONSENT AM OF) 2.5 hr case (Right)  BIOPSY, LYMPH NODE, SENTINEL RIGHT (Right)  LYMPHADENECTOMY, AXILLARY RIGHT (Right)    Final Diagnoses:   Principal Problem: right male breast cancer       Discharged Condition: stable    Disposition: Home or Self Care    Follow Up/Patient Instructions: 7-14 days    Medications:  Reconciled Home Medications:      Medication List      START taking these medications    oxyCODONE-acetaminophen 5-325 mg per tablet  Commonly known as:  PERCOCET  Take 1 tablet by mouth every 4 (four) hours as needed for Pain.        CHANGE how you take these medications    Levemir FlexTouch U-100 Insuln 100 unit/mL (3 mL) Inpn pen  Generic drug:  insulin detemir U-100  Inject 40 Units into the skin 2 (two) times daily.  What changed:  how much to take     metFORMIN 500 MG 24 hr tablet  Commonly known as:  GLUCOPHAGE-XR  Take 2 tablets (1,000 mg total) by mouth daily with breakfast.  What changed:  when to take this     NovoLOG Flexpen U-100 Insulin 100 unit/mL (3 mL) Inpn pen  Generic drug:  insulin aspart U-100  Inject 24 Units into the skin 3 (three) times daily.  What changed:  how much to take        CONTINUE taking these medications    amLODIPine 10 MG tablet  Commonly known as:  NORVASC  TAKE 1 TABLET (10 MG) BY MOUTH EVERY DAY     atorvastatin 20 MG tablet  Commonly known as:  LIPITOR  Take 1 tablet (20 mg total) by mouth once daily.     BD Ultra-Fine Meme Pen Needle 32 gauge x 5/32" Ndle  Generic drug:  pen needle, diabetic  Use as directed with novolog and levemir     blood sugar diagnostic Strp  Use to test BG 4 times a day (one Touch Viero)     flash glucose scanning " reader Misc  Commonly known as:  FreeStyle Paxton 14 Day [place]  Check glucose continuousl;y     flash glucose sensor Kit  Commonly known as:  FreeStyle Paxton 14 Day Sensor  Check glucose continously     gabapentin 300 MG capsule  Commonly known as:  NEURONTIN  Take 1 capsule (300 mg total) by mouth 3 (three) times daily.     hydroCHLOROthiazide 25 MG tablet  Commonly known as:  HYDRODIURIL  TAKE 1 TABLET BY MOUTH ONCE DAILY     lancing device Misc  1 Device by Misc.(Non-Drug; Combo Route) route 2 (two) times daily with meals.     lidocaine-prilocaine cream  Commonly known as:  EMLA  Apply topically to affected area 45 minutes before port access     losartan 100 MG tablet  Commonly known as:  COZAAR  TAKE 1 TABLET BY MOUTH ONCE DAILY     ondansetron 8 MG Tbdl  Commonly known as:  ZOFRAN-ODT  Take 1 tablet (8 mg total) by mouth every 12 (twelve) hours as needed.     * OneTouch Delica Plus Lancet 33 gauge Misc  Generic drug:  lancets  Use 2 (two) times daily with meals.     * lancets 33 gauge Misc  To check BG 4 times daily, to use with insurance preferred meter     OneTouch UltraMini kit  Generic drug:  blood-glucose meter  Use as instructed     senna 8.6 mg tablet  Commonly known as:  SENOKOT  Take 1 tablet by mouth once daily.     tadalafil 5 MG tablet  Commonly known as:  CIALIS  Take 2 tablets (10 mg total) by mouth daily as needed for Erectile Dysfunction.         * This list has 2 medication(s) that are the same as other medications prescribed for you. Read the directions carefully, and ask your doctor or other care provider to review them with you.              Discharge Procedure Orders   Diet general     Lifting restrictions     Call MD for:  temperature >100.4     Call MD for:  persistent nausea and vomiting     Call MD for:  severe uncontrolled pain     Call MD for:  difficulty breathing, headache or visual disturbances     Call MD for:  redness, tenderness, or signs of infection (pain, swelling, redness,  odor or green/yellow discharge around incision site)     Call MD for:  hives     Call MD for:  persistent dizziness or light-headedness     Call MD for:  extreme fatigue     No dressing needed         Diet: regular    Activity: as tolerated

## 2019-11-23 NOTE — PLAN OF CARE
11/23/19 1046   Final Note   Assessment Type Final Discharge Note   Anticipated Discharge Disposition Home   Hospital Follow Up  Appt(s) scheduled? Yes   Discharge plans and expectations educations in teach back method with documentation complete? Yes   Right Care Referral Info   Post Acute Recommendation No Care

## 2019-11-25 DIAGNOSIS — I10 HYPERTENSION, UNSPECIFIED TYPE: Primary | ICD-10-CM

## 2019-11-25 NOTE — PROGRESS NOTES
Refill Authorization Note     is requesting a refill authorization.    Brief assessment and rationale for refill: APPROVE: prr  Name and strength of medication: blood sugar diagnostic Strp       Medication Therapy Plan: Request to different pharmacy                   How patient will take medication: check BG QID          Comments:     Requested Prescriptions   Pending Prescriptions Disp Refills    blood sugar diagnostic Strp 400 each 3     Sig: To check BG 4 times daily, to use with insurance preferred meter       Endocrinology: Diabetes - Supplies Failed - 11/25/2019  4:05 PM        Failed - HBA1C is 7.9 or below and within 180 days     Hemoglobin A1C   Date Value Ref Range Status   10/10/2019 9.8 (H) 4.0 - 5.6 % Final     Comment:     ADA Screening Guidelines:  5.7-6.4%  Consistent with prediabetes  >or=6.5%  Consistent with diabetes  High levels of fetal hemoglobin interfere with the HbA1C  assay. Heterozygous hemoglobin variants (HbS, HgC, etc)do  not significantly interfere with this assay.   However, presence of multiple variants may affect accuracy.     10/28/2017 5.8 (H) 4.0 - 5.6 % Final     Comment:     According to ADA guidelines, hemoglobin A1c <7.0% represents  optimal control in non-pregnant diabetic patients. Different  metrics may apply to specific patient populations.   Standards of Medical Care in Diabetes-2016.  For the purpose of screening for the presence of diabetes:  <5.7%     Consistent with the absence of diabetes  5.7-6.4%  Consistent with increasing risk for diabetes   (prediabetes)  >or=6.5%  Consistent with diabetes  Currently, no consensus exists for use of hemoglobin A1c  for diagnosis of diabetes for children.  This Hemoglobin A1c assay has significant interference with fetal   hemoglobin   (HbF). The results are invalid for patients with abnormal amounts of   HbF,   including those with known Hereditary Persistence   of Fetal Hemoglobin. Heterozygous hemoglobin variants  (HbAS, HbAC,   HbAD, HbAE, HbA2) do not significantly interfere with this assay;   however, presence of multiple variants in a sample may impact the %   interference.     11/23/2015 6.3 (H) 4.5 - 6.2 % Final              Passed - Patient is at least 18 years old        Passed - Office visit in past 12 months or future 90 days     Recent Outpatient Visits            2 weeks ago Type 2 diabetes mellitus without retinopathy    [facility] Tari Burgess, KAMILAH    2 weeks ago Hydrocele in adult    [facility] JEB Burnette MD    2 weeks ago New onset type 2 diabetes mellitus    [facility] 6th [place] Wu Elaine MD    3 weeks ago Malignant neoplasm involving both nipple and areola of right breast in male, estrogen receptor negative    [facility] Richa Bahena MD    1 month ago New onset type 2 diabetes mellitus    [facility] Kareem Arroyo MD          Future Appointments              In 1 week MD [facility], [facility]    In 1 week LAB, HEMONC CANCER [facility]    In 1 week Richa Bahena MD [facility], [facility]    In 1 week INJECTION, [facility] INFUSION [facility]    In 1 month LAB, APPOINTMENT [place] [facility]    In 1 month LAB, APPOINTMENT [place] [facility]    In 1 month LAUREEN [facility] - Endocrinology 6th [place], [facility]    In 1 month Kareem Arroyo MD [facility]    In 2 months Heidy Segal RD, CDE [facility] Diabetes [facility], [facility]

## 2019-11-28 ENCOUNTER — PATIENT MESSAGE (OUTPATIENT)
Dept: SURGERY | Facility: CLINIC | Age: 44
End: 2019-11-28

## 2019-12-01 NOTE — PROGRESS NOTES
REFERRING PHYSICIAN:  No referring provider defined for this encounter.       Kareem Arroyo MD    MEDICAL ONCOLOGIST:    Richa Bahena MD  RADIATION ONCOLOGIST:   pending    DIAGNOSIS:    This is a 44 y.o. male with a stage pT1c N0 M0 grade 2 ER - DC - HER2 - invasive ductal carcinoma with lobular features of the right breast.    TREATMENT SUMMARY:  The patient is status post neoadjuvant chemotherapy (completed 10/8/2019) and subsequent right simple mastectomy and sentinel node biopsy on 11/22/19.  Final pathology pending.    INTERVAL HISTORY:   Paul Li Jr. comes in for a post-op check.  He denies fever, chills, chest pain or shortness of breath.  His pain is well controlled.      MEDICATIONS:  Current Outpatient Medications   Medication Sig Dispense Refill    amLODIPine (NORVASC) 10 MG tablet TAKE 1 TABLET (10 MG) BY MOUTH EVERY DAY 30 tablet 10    atorvastatin (LIPITOR) 20 MG tablet Take 1 tablet (20 mg total) by mouth once daily. 90 tablet 3    blood sugar diagnostic Strp To check BG 4 times daily, to use with insurance preferred meter 400 each 3    blood-glucose meter kit Use as instructed 1 each 0    flash glucose scanning reader (FREESTYLE MARIANO 14 DAY READER) Misc Check glucose continuousl;y 1 each 0    flash glucose sensor (FREESTYLE MARIANO 14 DAY SENSOR) Kit Check glucose continously 1 kit 11    gabapentin (NEURONTIN) 300 MG capsule Take 1 capsule (300 mg total) by mouth 3 (three) times daily. 90 capsule 11    hydroCHLOROthiazide (HYDRODIURIL) 25 MG tablet TAKE 1 TABLET BY MOUTH ONCE DAILY 90 tablet 3    insulin aspart U-100 (NOVOLOG) 100 unit/mL (3 mL) InPn pen Inject 24 Units into the skin 3 (three) times daily. (Patient taking differently: Inject 16 Units into the skin 3 (three) times daily. ) 64.8 mL 3    insulin detemir U-100 (LEVEMIR FLEXTOUCH) 100 unit/mL (3 mL) SubQ InPn pen Inject 40 Units into the skin 2 (two) times daily. (Patient taking differently: Inject 36 Units into the  "skin 2 (two) times daily. ) 24 mL 11    lancets 33 gauge Misc Use 2 (two) times daily with meals. 100 each 11    lancets 33 gauge Misc To check BG 4 times daily, to use with insurance preferred meter 200 each 11    lancing device Misc 1 Device by Misc.(Non-Drug; Combo Route) route 2 (two) times daily with meals. 1 each 0    lidocaine-prilocaine (EMLA) cream Apply topically to affected area 45 minutes before port access 30 g 1    losartan (COZAAR) 100 MG tablet TAKE 1 TABLET BY MOUTH ONCE DAILY 90 tablet 3    metFORMIN (GLUCOPHAGE-XR) 500 MG 24 hr tablet Take 2 tablets (1,000 mg total) by mouth daily with breakfast. (Patient taking differently: Take 1,000 mg by mouth 2 (two) times daily with meals. ) 180 tablet 3    ondansetron (ZOFRAN-ODT) 8 MG TbDL Take 1 tablet (8 mg total) by mouth every 12 (twelve) hours as needed. 20 tablet 1    oxyCODONE-acetaminophen (PERCOCET) 5-325 mg per tablet Take 1 tablet by mouth every 4 (four) hours as needed for Pain. 40 tablet 0    pen needle, diabetic (BD ULTRA-FINE TANESHA PEN NEEDLE) 32 gauge x 5/32" Ndle Use as directed with novolog and levemir 100 each 5    senna (SENOKOT) 8.6 mg tablet Take 1 tablet by mouth once daily.      tadalafil (CIALIS) 5 MG tablet Take 2 tablets (10 mg total) by mouth daily as needed for Erectile Dysfunction. 20 tablet 1     No current facility-administered medications for this visit.        ALLERGIES:   Review of patient's allergies indicates:  No Known Allergies    PHYSICAL EXAMINATION:   General:  This is a well appearing male with appropriate speech, affect and gait.     Breast:  Incision clean, dry, and intact. No surrounding erythema or drainage from incision.  Some edema in the skin.    IMPRESSION:   The patient has had an uneventful postoperative course.    PLAN:   rtc 1 week  "

## 2019-12-02 ENCOUNTER — DOCUMENTATION ONLY (OUTPATIENT)
Dept: HEMATOLOGY/ONCOLOGY | Facility: CLINIC | Age: 44
End: 2019-12-02

## 2019-12-02 ENCOUNTER — PATIENT MESSAGE (OUTPATIENT)
Dept: HEMATOLOGY/ONCOLOGY | Facility: CLINIC | Age: 44
End: 2019-12-02

## 2019-12-02 ENCOUNTER — OFFICE VISIT (OUTPATIENT)
Dept: SURGERY | Facility: CLINIC | Age: 44
End: 2019-12-02
Payer: MEDICAID

## 2019-12-02 VITALS
HEIGHT: 75 IN | HEART RATE: 69 BPM | TEMPERATURE: 98 F | DIASTOLIC BLOOD PRESSURE: 68 MMHG | WEIGHT: 315 LBS | BODY MASS INDEX: 39.17 KG/M2 | SYSTOLIC BLOOD PRESSURE: 113 MMHG

## 2019-12-02 DIAGNOSIS — C50.921 MALIGNANT NEOPLASM OF RIGHT MALE BREAST, UNSPECIFIED ESTROGEN RECEPTOR STATUS, UNSPECIFIED SITE OF BREAST: Primary | ICD-10-CM

## 2019-12-02 PROCEDURE — 99999 PR PBB SHADOW E&M-EST. PATIENT-LVL III: ICD-10-PCS | Mod: PBBFAC,,, | Performed by: SURGERY

## 2019-12-02 PROCEDURE — 99024 PR POST-OP FOLLOW-UP VISIT: ICD-10-PCS | Mod: ,,, | Performed by: SURGERY

## 2019-12-02 PROCEDURE — 99024 POSTOP FOLLOW-UP VISIT: CPT | Mod: ,,, | Performed by: SURGERY

## 2019-12-02 PROCEDURE — 99999 PR PBB SHADOW E&M-EST. PATIENT-LVL III: CPT | Mod: PBBFAC,,, | Performed by: SURGERY

## 2019-12-02 PROCEDURE — 99213 OFFICE O/P EST LOW 20 MIN: CPT | Mod: PBBFAC | Performed by: SURGERY

## 2019-12-02 NOTE — PROGRESS NOTES
Received call/VM message from patient this afternoon. He reported that he has lost Medicaid coverage; his income is now too much since he started receiving his disability benefits. He said he never received notification via mail from Medicaid. He went to clinic today for a post op follow up appointment and his Medicaid wasn't active when he checked in for his appointment. He called Medicaid and spoke with staff, who informed him that was the reason. He tried to search on the marketplace but is finding policies that cost $300-$500 a month with high deductibles, and he needs assistance. Discussed with patient and with his approval I sent an email to Ana Loo with our [facility] Dept., asking if she can verify with Medicaid and if he's unable to continue receiving Medicaid to refer him to the  for help in getting an affordable care plan. Also discussed with patient re: applying for [facility]'s financial assistance and provided patient with the phone number for Zaid Wyatt,  in Cancer Services. CC'ed Zaid on the email to Ana. Notifying Dr. Bahena and Hem Onc Clinic Manager via [employer] message.

## 2019-12-06 ENCOUNTER — TELEPHONE (OUTPATIENT)
Dept: HEMATOLOGY/ONCOLOGY | Facility: CLINIC | Age: 44
End: 2019-12-06

## 2019-12-06 NOTE — TELEPHONE ENCOUNTER
----- Message from Marilin Augustin LCSW sent at 12/5/2019  6:24 PM CST -----  Regarding: Insurance Issues  Contact: [phone]  Patient called me this afternoon. He said that he received a call from staff in Financial Services and was told that he has to have insurance in order to qualify for assistance. He said he explained to her that he lost Medicaid due to his disability income putting him over the limit.     This doesn't make sense. We have other patients with no coverage/who have lost coverage who've been approved [facility].  Can you reach out to the contact in Financial Services for clarification since [facility]?

## 2019-12-09 ENCOUNTER — PATIENT MESSAGE (OUTPATIENT)
Dept: HEMATOLOGY/ONCOLOGY | Facility: CLINIC | Age: 44
End: 2019-12-09

## 2019-12-10 ENCOUNTER — PATIENT MESSAGE (OUTPATIENT)
Dept: HEMATOLOGY/ONCOLOGY | Facility: CLINIC | Age: 44
End: 2019-12-10

## 2019-12-11 ENCOUNTER — PATIENT MESSAGE (OUTPATIENT)
Dept: HEMATOLOGY/ONCOLOGY | Facility: CLINIC | Age: 44
End: 2019-12-11

## 2019-12-11 NOTE — TELEPHONE ENCOUNTER
I spoke with Zaid teague (  ) to clarify the status of finanacial assistance application. Since returning from being out she has  Reviewed documentation and  and spoke  with staff members/patient regarding satus and reported findings that ,     The  Financial counseling dept  researched/investigated all information noted on this patient. The end result is the patient have been denied Medicaid coverage. ( he is over the income  Limit) Per JOHN Loo (Medicaid).    The    ran a FA screening on patient and spouse (Leena) both are below 200 FPL, however Paul Salazar has a household of 5 $26.000 Spouse has HH 4 $20.000.   F/C called Paul RICCI For proof of income and bank statement for both he and his spouse. Mr. Li stated that he does not have a  bank account and he and his wife are . F/C advised patient of the information listed on file, he then stated that he would speak with his spouse  for the information needed. F/C will flag appt notes for date 12/12/2019 F/A Pending.    Zaid has spoken with the patient and will be meeting with patient at 2:15pm prior to MD visit.

## 2019-12-12 ENCOUNTER — PATIENT MESSAGE (OUTPATIENT)
Dept: ENDOCRINOLOGY | Facility: HOSPITAL | Age: 44
End: 2019-12-12

## 2019-12-12 ENCOUNTER — OFFICE VISIT (OUTPATIENT)
Dept: SURGERY | Facility: CLINIC | Age: 44
End: 2019-12-12
Payer: MEDICAID

## 2019-12-12 VITALS
TEMPERATURE: 98 F | HEART RATE: 68 BPM | SYSTOLIC BLOOD PRESSURE: 103 MMHG | DIASTOLIC BLOOD PRESSURE: 59 MMHG | HEIGHT: 75 IN | BODY MASS INDEX: 52.36 KG/M2

## 2019-12-12 DIAGNOSIS — C50.921 MALIGNANT NEOPLASM OF RIGHT MALE BREAST, UNSPECIFIED ESTROGEN RECEPTOR STATUS, UNSPECIFIED SITE OF BREAST: Primary | ICD-10-CM

## 2019-12-12 DIAGNOSIS — C50.921: Primary | ICD-10-CM

## 2019-12-12 LAB
COMMENT: NORMAL
FINAL PATHOLOGIC DIAGNOSIS: NORMAL
FROZEN SECTION DIAGNOSIS: NORMAL
GROSS: NORMAL

## 2019-12-12 PROCEDURE — 99213 OFFICE O/P EST LOW 20 MIN: CPT | Mod: PBBFAC | Performed by: SURGERY

## 2019-12-12 PROCEDURE — 99999 PR PBB SHADOW E&M-EST. PATIENT-LVL III: ICD-10-PCS | Mod: PBBFAC,,, | Performed by: SURGERY

## 2019-12-12 PROCEDURE — 99024 POSTOP FOLLOW-UP VISIT: CPT | Mod: ,,, | Performed by: SURGERY

## 2019-12-12 PROCEDURE — 99024 PR POST-OP FOLLOW-UP VISIT: ICD-10-PCS | Mod: ,,, | Performed by: SURGERY

## 2019-12-12 PROCEDURE — 99999 PR PBB SHADOW E&M-EST. PATIENT-LVL III: CPT | Mod: PBBFAC,,, | Performed by: SURGERY

## 2019-12-12 NOTE — H&P (VIEW-ONLY)
REFERRING PHYSICIAN:  No referring provider defined for this encounter.       Kareem Arroyo MD    MEDICAL ONCOLOGIST:    Richa Bahena MD  RADIATION ONCOLOGIST:   pending    DIAGNOSIS:    This is a 44 y.o. male with a stage pT1c N0 M0 grade 2 ER - [place] - HER2 - invasive ductal carcinoma with lobular features of the right breast.    TREATMENT SUMMARY:  The patient is status post neoadjuvant chemotherapy (completed 10/8/2019) and subsequent right simple mastectomy and sentinel node biopsy on 11/22/19.  Final pathology 6 mm residual IDC, also 1/4 LN positive with 4mm met.      INTERVAL HISTORY:   Paul Li Jr. comes in for a post-op check.  He denies fever, chills, chest pain or shortness of breath.  His pain is well controlled.      MEDICATIONS:  Current Outpatient Medications   Medication Sig Dispense Refill    amLODIPine (NORVASC) 10 MG tablet TAKE 1 TABLET (10 MG) BY MOUTH EVERY DAY 30 tablet 10    atorvastatin (LIPITOR) 20 MG tablet Take 1 tablet (20 mg total) by mouth once daily. 90 tablet 3    blood sugar diagnostic Strp To check BG 4 times daily, to use with insurance preferred meter 400 each 3    blood-glucose meter kit Use as instructed 1 each 0    flash glucose scanning reader (FREESTYLE MARIANO 14 DAY READER) Misc Check glucose continuousl;y 1 each 0    flash glucose sensor (FREESTYLE MARIANO 14 DAY SENSOR) Kit Check glucose continously 1 kit 11    gabapentin (NEURONTIN) 300 MG capsule Take 1 capsule (300 mg total) by mouth 3 (three) times daily. 90 capsule 11    hydroCHLOROthiazide (HYDRODIURIL) 25 MG tablet TAKE 1 TABLET BY MOUTH ONCE DAILY 90 tablet 3    insulin aspart U-100 (NOVOLOG) 100 unit/mL (3 mL) InPn pen Inject 24 Units into the skin 3 (three) times daily. (Patient taking differently: Inject 16 Units into the skin 3 (three) times daily. ) 64.8 mL 3    insulin detemir U-100 (LEVEMIR FLEXTOUCH) 100 unit/mL (3 mL) SubQ InPn pen Inject 40 Units into the skin 2 (two) times daily. (Patient  "taking differently: Inject 36 Units into the skin 2 (two) times daily. ) 24 mL 11    lancets 33 gauge Misc Use 2 (two) times daily with meals. 100 each 11    lancets 33 gauge Misc To check BG 4 times daily, to use with insurance preferred meter 200 each 11    lancing device Misc 1 Device by Misc.(Non-Drug; Combo Route) route 2 (two) times daily with meals. 1 each 0    lidocaine-prilocaine (EMLA) cream Apply topically to affected area 45 minutes before port access 30 g 1    losartan (COZAAR) 100 MG tablet TAKE 1 TABLET BY MOUTH ONCE DAILY 90 tablet 3    metFORMIN (GLUCOPHAGE-XR) 500 MG 24 hr tablet Take 2 tablets (1,000 mg total) by mouth daily with breakfast. (Patient taking differently: Take 1,000 mg by mouth 2 (two) times daily with meals. ) 180 tablet 3    ondansetron (ZOFRAN-ODT) 8 MG TbDL Take 1 tablet (8 mg total) by mouth every 12 (twelve) hours as needed. 20 tablet 1    oxyCODONE-acetaminophen (PERCOCET) 5-325 mg per tablet Take 1 tablet by mouth every 4 (four) hours as needed for Pain. 40 tablet 0    pen needle, diabetic (BD ULTRA-FINE TANESHA PEN NEEDLE) 32 gauge x 5/32" Ndle Use as directed with novolog and levemir 100 each 5    senna (SENOKOT) 8.6 mg tablet Take 1 tablet by mouth once daily.      tadalafil (CIALIS) 5 MG tablet Take 2 tablets (10 mg total) by mouth daily as needed for Erectile Dysfunction. 20 tablet 1     No current facility-administered medications for this visit.        ALLERGIES:   Review of patient's allergies indicates:  No Known Allergies    PHYSICAL EXAMINATION:   General:  This is a well appearing male with appropriate speech, affect and gait.     Breast:  Incision clean, dry, and intact. No surrounding erythema or drainage from incision.  Some edema in the skin.    IMPRESSION:   The patient has had an uneventful postoperative course.    PLAN:   Will need ALND--plan for tomorrow.  Discussed need for PMRT and possible options for clinical trials for residual TNBC.  "

## 2019-12-12 NOTE — PROGRESS NOTES
REFERRING PHYSICIAN:  No referring provider defined for this encounter.       Kareem Arroyo MD    MEDICAL ONCOLOGIST:    Richa Bahena MD  RADIATION ONCOLOGIST:   pending    DIAGNOSIS:    This is a 44 y.o. male with a stage pT1c N0 M0 grade 2 ER - [place] - HER2 - invasive ductal carcinoma with lobular features of the right breast.    TREATMENT SUMMARY:  The patient is status post neoadjuvant chemotherapy (completed 10/8/2019) and subsequent right simple mastectomy and sentinel node biopsy on 11/22/19.  Final pathology 6 mm residual IDC, also 1/4 LN positive with 4mm met.      INTERVAL HISTORY:   Paul Li Jr. comes in for a post-op check.  He denies fever, chills, chest pain or shortness of breath.  His pain is well controlled.      MEDICATIONS:  Current Outpatient Medications   Medication Sig Dispense Refill    amLODIPine (NORVASC) 10 MG tablet TAKE 1 TABLET (10 MG) BY MOUTH EVERY DAY 30 tablet 10    atorvastatin (LIPITOR) 20 MG tablet Take 1 tablet (20 mg total) by mouth once daily. 90 tablet 3    blood sugar diagnostic Strp To check BG 4 times daily, to use with insurance preferred meter 400 each 3    blood-glucose meter kit Use as instructed 1 each 0    flash glucose scanning reader (FREESTYLE MARIANO 14 DAY READER) Misc Check glucose continuousl;y 1 each 0    flash glucose sensor (FREESTYLE MARIANO 14 DAY SENSOR) Kit Check glucose continously 1 kit 11    gabapentin (NEURONTIN) 300 MG capsule Take 1 capsule (300 mg total) by mouth 3 (three) times daily. 90 capsule 11    hydroCHLOROthiazide (HYDRODIURIL) 25 MG tablet TAKE 1 TABLET BY MOUTH ONCE DAILY 90 tablet 3    insulin aspart U-100 (NOVOLOG) 100 unit/mL (3 mL) InPn pen Inject 24 Units into the skin 3 (three) times daily. (Patient taking differently: Inject 16 Units into the skin 3 (three) times daily. ) 64.8 mL 3    insulin detemir U-100 (LEVEMIR FLEXTOUCH) 100 unit/mL (3 mL) SubQ InPn pen Inject 40 Units into the skin 2 (two) times daily. (Patient  "taking differently: Inject 36 Units into the skin 2 (two) times daily. ) 24 mL 11    lancets 33 gauge Misc Use 2 (two) times daily with meals. 100 each 11    lancets 33 gauge Misc To check BG 4 times daily, to use with insurance preferred meter 200 each 11    lancing device Misc 1 Device by Misc.(Non-Drug; Combo Route) route 2 (two) times daily with meals. 1 each 0    lidocaine-prilocaine (EMLA) cream Apply topically to affected area 45 minutes before port access 30 g 1    losartan (COZAAR) 100 MG tablet TAKE 1 TABLET BY MOUTH ONCE DAILY 90 tablet 3    metFORMIN (GLUCOPHAGE-XR) 500 MG 24 hr tablet Take 2 tablets (1,000 mg total) by mouth daily with breakfast. (Patient taking differently: Take 1,000 mg by mouth 2 (two) times daily with meals. ) 180 tablet 3    ondansetron (ZOFRAN-ODT) 8 MG TbDL Take 1 tablet (8 mg total) by mouth every 12 (twelve) hours as needed. 20 tablet 1    oxyCODONE-acetaminophen (PERCOCET) 5-325 mg per tablet Take 1 tablet by mouth every 4 (four) hours as needed for Pain. 40 tablet 0    pen needle, diabetic (BD ULTRA-FINE TANESHA PEN NEEDLE) 32 gauge x 5/32" Ndle Use as directed with novolog and levemir 100 each 5    senna (SENOKOT) 8.6 mg tablet Take 1 tablet by mouth once daily.      tadalafil (CIALIS) 5 MG tablet Take 2 tablets (10 mg total) by mouth daily as needed for Erectile Dysfunction. 20 tablet 1     No current facility-administered medications for this visit.        ALLERGIES:   Review of patient's allergies indicates:  No Known Allergies    PHYSICAL EXAMINATION:   General:  This is a well appearing male with appropriate speech, affect and gait.     Breast:  Incision clean, dry, and intact. No surrounding erythema or drainage from incision.  Some edema in the skin.    IMPRESSION:   The patient has had an uneventful postoperative course.    PLAN:   Will need ALND--plan for tomorrow.  Discussed need for PMRT and possible options for clinical trials for residual TNBC.  "

## 2019-12-13 ENCOUNTER — HOSPITAL ENCOUNTER (OUTPATIENT)
Facility: HOSPITAL | Age: 44
Discharge: HOME | End: 2019-12-13
Attending: SURGERY | Admitting: SURGERY
Payer: MEDICAID

## 2019-12-13 VITALS
TEMPERATURE: 98 F | WEIGHT: 315 LBS | OXYGEN SATURATION: 100 % | HEART RATE: 65 BPM | RESPIRATION RATE: 20 BRPM | HEIGHT: 74 IN | BODY MASS INDEX: 40.43 KG/M2

## 2019-12-13 DIAGNOSIS — Z17.1 MALIGNANT NEOPLASM INVOLVING BOTH NIPPLE AND AREOLA OF RIGHT BREAST IN MALE, ESTROGEN RECEPTOR NEGATIVE: Primary | ICD-10-CM

## 2019-12-13 DIAGNOSIS — C50.919 BREAST CANCER: ICD-10-CM

## 2019-12-13 DIAGNOSIS — C50.021 MALIGNANT NEOPLASM INVOLVING BOTH NIPPLE AND AREOLA OF RIGHT BREAST IN MALE, ESTROGEN RECEPTOR NEGATIVE: Primary | ICD-10-CM

## 2019-12-13 PROCEDURE — 82962 GLUCOSE BLOOD TEST: CPT | Performed by: SURGERY

## 2019-12-13 PROCEDURE — 99499 NO LOS: ICD-10-PCS | Mod: ,,, | Performed by: SURGERY

## 2019-12-13 PROCEDURE — 99499 UNLISTED E&M SERVICE: CPT | Mod: ,,, | Performed by: SURGERY

## 2019-12-13 RX ORDER — LIDOCAINE HYDROCHLORIDE 10 MG/ML
1 INJECTION, SOLUTION EPIDURAL; INFILTRATION; INTRACAUDAL; PERINEURAL ONCE
Status: DISCONTINUED | OUTPATIENT
Start: 2019-12-13 | End: 2019-12-13 | Stop reason: HOSPADM

## 2019-12-13 RX ORDER — CEFAZOLIN SODIUM 1 G/3ML
2 INJECTION, POWDER, FOR SOLUTION INTRAMUSCULAR; INTRAVENOUS
Status: DISCONTINUED | OUTPATIENT
Start: 2019-12-13 | End: 2019-12-13 | Stop reason: HOSPADM

## 2019-12-13 RX ORDER — SODIUM CHLORIDE 9 MG/ML
INJECTION, SOLUTION INTRAVENOUS CONTINUOUS
Status: DISCONTINUED | OUTPATIENT
Start: 2019-12-13 | End: 2019-12-13 | Stop reason: HOSPADM

## 2019-12-13 NOTE — PROGRESS NOTES
Still awaiting to hear from Dr. Whyte in reference to NPO status.   1420- Dr. Whyte at bedside talking to PT. Sx cancelled.  Unable to finish assessment. Dr. Whyte's office to reschedule sx. Will call Pt to reschedule for Tuesday. Pt and spouse verbalized agreement. Dr. Whyte to put DC orders in computer.

## 2019-12-13 NOTE — PROGRESS NOTES
Notified Dr. Dean of pt's NPO status. States pt cannot go to OR until 1645. Dr. Whyte notified. States she will come talk to patient about possibly re-scheduling procedure.

## 2019-12-16 ENCOUNTER — PATIENT MESSAGE (OUTPATIENT)
Dept: SURGERY | Facility: HOSPITAL | Age: 44
End: 2019-12-16

## 2019-12-16 ENCOUNTER — TELEPHONE (OUTPATIENT)
Dept: SURGERY | Facility: CLINIC | Age: 44
End: 2019-12-16

## 2019-12-16 ENCOUNTER — PATIENT MESSAGE (OUTPATIENT)
Dept: HEMATOLOGY/ONCOLOGY | Facility: CLINIC | Age: 44
End: 2019-12-16

## 2019-12-17 ENCOUNTER — ANESTHESIA EVENT (OUTPATIENT)
Dept: SURGERY | Facility: HOSPITAL | Age: 44
End: 2019-12-17

## 2019-12-17 ENCOUNTER — HOSPITAL ENCOUNTER (OUTPATIENT)
Facility: HOSPITAL | Age: 44
Discharge: HOME | End: 2019-12-18
Attending: SURGERY | Admitting: SURGERY

## 2019-12-17 ENCOUNTER — ANESTHESIA (OUTPATIENT)
Dept: SURGERY | Facility: HOSPITAL | Age: 44
End: 2019-12-17

## 2019-12-17 DIAGNOSIS — Z17.1: ICD-10-CM

## 2019-12-17 DIAGNOSIS — C50.929: Primary | ICD-10-CM

## 2019-12-17 DIAGNOSIS — C50.021: ICD-10-CM

## 2019-12-17 LAB
POCT GLUCOSE: 131 MG/DL (ref 70–110)
POCT GLUCOSE: 133 MG/DL (ref 70–110)
POCT GLUCOSE: 86 MG/DL (ref 70–110)

## 2019-12-17 PROCEDURE — 63600175 PHARM REV CODE 636 W HCPCS: Performed by: ANESTHESIOLOGY

## 2019-12-17 PROCEDURE — 36000706: Performed by: SURGERY

## 2019-12-17 PROCEDURE — 88305 TISSUE EXAM BY PATHOLOGIST: CPT | Mod: 26,,, | Performed by: PATHOLOGY

## 2019-12-17 PROCEDURE — 25000003 PHARM REV CODE 250: Performed by: ANESTHESIOLOGY

## 2019-12-17 PROCEDURE — 36000707: Performed by: SURGERY

## 2019-12-17 PROCEDURE — 64461 PVB THORACIC SINGLE INJ SITE: CPT | Performed by: STUDENT IN AN ORGANIZED HEALTH CARE EDUCATION/TRAINING PROGRAM

## 2019-12-17 PROCEDURE — 64461 PVB THORACIC SINGLE INJ SITE: CPT | Mod: 59,RT,, | Performed by: ANESTHESIOLOGY

## 2019-12-17 PROCEDURE — 38525 PR BIOPSY/REM LYMPH NODES, AXILLARY: ICD-10-PCS | Mod: 51,RT,, | Performed by: SURGERY

## 2019-12-17 PROCEDURE — G0378 HOSPITAL OBSERVATION PER HR: HCPCS

## 2019-12-17 PROCEDURE — 64461 ERECTOR SPINAE PLANE SINGLE INJECTION BLOCK: ICD-10-PCS | Mod: 59,RT,, | Performed by: ANESTHESIOLOGY

## 2019-12-17 PROCEDURE — 37000009 HC ANESTHESIA EA ADD 15 MINS: Performed by: SURGERY

## 2019-12-17 PROCEDURE — 88307 PR  SURG PATH,LEVEL V: ICD-10-PCS | Mod: 26,,, | Performed by: PATHOLOGY

## 2019-12-17 PROCEDURE — 82962 GLUCOSE BLOOD TEST: CPT | Performed by: SURGERY

## 2019-12-17 PROCEDURE — C1729 CATH, DRAINAGE: HCPCS | Performed by: SURGERY

## 2019-12-17 PROCEDURE — 38525 BIOPSY/REMOVAL LYMPH NODES: CPT | Mod: 51,RT,, | Performed by: SURGERY

## 2019-12-17 PROCEDURE — 25000003 PHARM REV CODE 250: Performed by: SURGERY

## 2019-12-17 PROCEDURE — A4216 STERILE WATER/SALINE, 10 ML: HCPCS | Performed by: STUDENT IN AN ORGANIZED HEALTH CARE EDUCATION/TRAINING PROGRAM

## 2019-12-17 PROCEDURE — 63600175 PHARM REV CODE 636 W HCPCS: Performed by: SURGERY

## 2019-12-17 PROCEDURE — 15839 PR EXCISE EXCESS SKIN TISSUE,OTHER: ICD-10-PCS | Mod: ,,, | Performed by: SURGERY

## 2019-12-17 PROCEDURE — 76942 ECHO GUIDE FOR BIOPSY: CPT | Performed by: STUDENT IN AN ORGANIZED HEALTH CARE EDUCATION/TRAINING PROGRAM

## 2019-12-17 PROCEDURE — D9220A PRA ANESTHESIA: Mod: ,,, | Performed by: ANESTHESIOLOGY

## 2019-12-17 PROCEDURE — 94761 N-INVAS EAR/PLS OXIMETRY MLT: CPT

## 2019-12-17 PROCEDURE — 63600175 PHARM REV CODE 636 W HCPCS: Performed by: STUDENT IN AN ORGANIZED HEALTH CARE EDUCATION/TRAINING PROGRAM

## 2019-12-17 PROCEDURE — 88307 TISSUE EXAM BY PATHOLOGIST: CPT | Mod: 26,,, | Performed by: PATHOLOGY

## 2019-12-17 PROCEDURE — 15839 EXC EXCESSIVE SKN OTHER AREA: CPT | Mod: ,,, | Performed by: SURGERY

## 2019-12-17 PROCEDURE — D9220A PRA ANESTHESIA: ICD-10-PCS | Mod: ,,, | Performed by: ANESTHESIOLOGY

## 2019-12-17 PROCEDURE — 71000033 HC RECOVERY, INTIAL HOUR: Performed by: SURGERY

## 2019-12-17 PROCEDURE — 88305 TISSUE EXAM BY PATHOLOGIST: ICD-10-PCS | Mod: 26,,, | Performed by: PATHOLOGY

## 2019-12-17 PROCEDURE — 88307 TISSUE EXAM BY PATHOLOGIST: CPT | Performed by: PATHOLOGY

## 2019-12-17 PROCEDURE — 88305 TISSUE EXAM BY PATHOLOGIST: CPT | Performed by: PATHOLOGY

## 2019-12-17 PROCEDURE — 25000003 PHARM REV CODE 250: Performed by: STUDENT IN AN ORGANIZED HEALTH CARE EDUCATION/TRAINING PROGRAM

## 2019-12-17 PROCEDURE — 37000008 HC ANESTHESIA 1ST 15 MINUTES: Performed by: SURGERY

## 2019-12-17 RX ORDER — MIDAZOLAM HYDROCHLORIDE 1 MG/ML
0.5 INJECTION INTRAMUSCULAR; INTRAVENOUS
Status: DISCONTINUED | OUTPATIENT
Start: 2019-12-17 | End: 2019-12-17

## 2019-12-17 RX ORDER — FENTANYL CITRATE 50 UG/ML
25 INJECTION, SOLUTION INTRAMUSCULAR; INTRAVENOUS EVERY 5 MIN PRN
Status: COMPLETED | OUTPATIENT
Start: 2019-12-17 | End: 2019-12-17

## 2019-12-17 RX ORDER — CEFAZOLIN SODIUM 1 G/3ML
2 INJECTION, POWDER, FOR SOLUTION INTRAMUSCULAR; INTRAVENOUS
Status: COMPLETED | OUTPATIENT
Start: 2019-12-17 | End: 2019-12-17

## 2019-12-17 RX ORDER — FENTANYL CITRATE 50 UG/ML
25 INJECTION, SOLUTION INTRAMUSCULAR; INTRAVENOUS EVERY 5 MIN PRN
Status: DISCONTINUED | OUTPATIENT
Start: 2019-12-17 | End: 2019-12-17

## 2019-12-17 RX ORDER — HYDROCHLOROTHIAZIDE 25 MG/1
25 TABLET ORAL DAILY
Status: DISCONTINUED | OUTPATIENT
Start: 2019-12-18 | End: 2019-12-18 | Stop reason: HOSPADM

## 2019-12-17 RX ORDER — AMLODIPINE BESYLATE 10 MG/1
10 TABLET ORAL EVERY MORNING
Status: DISCONTINUED | OUTPATIENT
Start: 2019-12-18 | End: 2019-12-18 | Stop reason: HOSPADM

## 2019-12-17 RX ORDER — SODIUM CHLORIDE 9 MG/ML
INJECTION, SOLUTION INTRAVENOUS CONTINUOUS
Status: DISCONTINUED | OUTPATIENT
Start: 2019-12-17 | End: 2019-12-17

## 2019-12-17 RX ORDER — SODIUM CHLORIDE 0.9 % (FLUSH) 0.9 %
10 SYRINGE (ML) INJECTION
Status: DISCONTINUED | OUTPATIENT
Start: 2019-12-17 | End: 2019-12-17 | Stop reason: HOSPADM

## 2019-12-17 RX ORDER — MUPIROCIN 20 MG/G
1 OINTMENT TOPICAL 2 TIMES DAILY
Status: DISCONTINUED | OUTPATIENT
Start: 2019-12-17 | End: 2019-12-18 | Stop reason: HOSPADM

## 2019-12-17 RX ORDER — OXYCODONE AND ACETAMINOPHEN 5; 325 MG/1; MG/1
1 TABLET ORAL EVERY 4 HOURS PRN
Status: DISCONTINUED | OUTPATIENT
Start: 2019-12-17 | End: 2019-12-18 | Stop reason: HOSPADM

## 2019-12-17 RX ORDER — HEPARIN SODIUM 5000 [USP'U]/ML
INJECTION, SOLUTION INTRAVENOUS; SUBCUTANEOUS
Status: DISCONTINUED | OUTPATIENT
Start: 2019-12-17 | End: 2019-12-17

## 2019-12-17 RX ORDER — IBUPROFEN 200 MG
24 TABLET ORAL
Status: DISCONTINUED | OUTPATIENT
Start: 2019-12-17 | End: 2019-12-18 | Stop reason: HOSPADM

## 2019-12-17 RX ORDER — BUPIVACAINE HYDROCHLORIDE AND EPINEPHRINE 5; 5 MG/ML; UG/ML
INJECTION, SOLUTION EPIDURAL; INTRACAUDAL; PERINEURAL
Status: COMPLETED | OUTPATIENT
Start: 2019-12-17 | End: 2019-12-17

## 2019-12-17 RX ORDER — INSULIN ASPART 100 [IU]/ML
1-10 INJECTION, SOLUTION INTRAVENOUS; SUBCUTANEOUS
Status: DISCONTINUED | OUTPATIENT
Start: 2019-12-17 | End: 2019-12-17

## 2019-12-17 RX ORDER — LIDOCAINE HYDROCHLORIDE 10 MG/ML
1 INJECTION, SOLUTION EPIDURAL; INFILTRATION; INTRACAUDAL; PERINEURAL ONCE
Status: DISCONTINUED | OUTPATIENT
Start: 2019-12-17 | End: 2019-12-17

## 2019-12-17 RX ORDER — ATORVASTATIN CALCIUM 20 MG/1
20 TABLET, FILM COATED ORAL DAILY
Status: DISCONTINUED | OUTPATIENT
Start: 2019-12-18 | End: 2019-12-18 | Stop reason: HOSPADM

## 2019-12-17 RX ORDER — LIDOCAINE HCL/PF 100 MG/5ML
SYRINGE (ML) INTRAVENOUS
Status: DISCONTINUED | OUTPATIENT
Start: 2019-12-17 | End: 2019-12-17

## 2019-12-17 RX ORDER — GLUCAGON 1 MG
1 KIT INJECTION
Status: DISCONTINUED | OUTPATIENT
Start: 2019-12-17 | End: 2019-12-18 | Stop reason: HOSPADM

## 2019-12-17 RX ORDER — ONDANSETRON 8 MG/1
8 TABLET, ORALLY DISINTEGRATING ORAL EVERY 6 HOURS PRN
Status: DISCONTINUED | OUTPATIENT
Start: 2019-12-17 | End: 2019-12-18 | Stop reason: HOSPADM

## 2019-12-17 RX ORDER — FENTANYL CITRATE 50 UG/ML
INJECTION, SOLUTION INTRAMUSCULAR; INTRAVENOUS
Status: DISCONTINUED | OUTPATIENT
Start: 2019-12-17 | End: 2019-12-17

## 2019-12-17 RX ORDER — LOSARTAN POTASSIUM 25 MG/1
100 TABLET ORAL DAILY
Status: DISCONTINUED | OUTPATIENT
Start: 2019-12-18 | End: 2019-12-18 | Stop reason: HOSPADM

## 2019-12-17 RX ORDER — GABAPENTIN 300 MG/1
300 CAPSULE ORAL 3 TIMES DAILY
Status: DISCONTINUED | OUTPATIENT
Start: 2019-12-17 | End: 2019-12-18 | Stop reason: HOSPADM

## 2019-12-17 RX ORDER — DEXAMETHASONE SODIUM PHOSPHATE 4 MG/ML
INJECTION, SOLUTION INTRA-ARTICULAR; INTRALESIONAL; INTRAMUSCULAR; INTRAVENOUS; SOFT TISSUE
Status: DISCONTINUED | OUTPATIENT
Start: 2019-12-17 | End: 2019-12-17

## 2019-12-17 RX ORDER — KETAMINE HCL IN 0.9 % NACL 50 MG/5 ML
SYRINGE (ML) INTRAVENOUS
Status: DISCONTINUED | OUTPATIENT
Start: 2019-12-17 | End: 2019-12-17

## 2019-12-17 RX ORDER — IBUPROFEN 200 MG
16 TABLET ORAL
Status: DISCONTINUED | OUTPATIENT
Start: 2019-12-17 | End: 2019-12-18 | Stop reason: HOSPADM

## 2019-12-17 RX ORDER — PROPOFOL 10 MG/ML
VIAL (ML) INTRAVENOUS
Status: DISCONTINUED | OUTPATIENT
Start: 2019-12-17 | End: 2019-12-17

## 2019-12-17 RX ORDER — SUCCINYLCHOLINE CHLORIDE 20 MG/ML
INJECTION INTRAMUSCULAR; INTRAVENOUS
Status: DISCONTINUED | OUTPATIENT
Start: 2019-12-17 | End: 2019-12-17

## 2019-12-17 RX ORDER — INSULIN ASPART 100 [IU]/ML
0-5 INJECTION, SOLUTION INTRAVENOUS; SUBCUTANEOUS
Status: DISCONTINUED | OUTPATIENT
Start: 2019-12-17 | End: 2019-12-18 | Stop reason: HOSPADM

## 2019-12-17 RX ORDER — SENNOSIDES 8.6 MG/1
1 TABLET ORAL DAILY
Status: DISCONTINUED | OUTPATIENT
Start: 2019-12-18 | End: 2019-12-18 | Stop reason: HOSPADM

## 2019-12-17 RX ADMIN — PROPOFOL 40 MG: 10 INJECTION, EMULSION INTRAVENOUS at 02:12

## 2019-12-17 RX ADMIN — PROPOFOL 40 MG: 10 INJECTION, EMULSION INTRAVENOUS at 01:12

## 2019-12-17 RX ADMIN — SUCCINYLCHOLINE CHLORIDE 100 MG: 20 INJECTION, SOLUTION INTRAMUSCULAR; INTRAVENOUS at 11:12

## 2019-12-17 RX ADMIN — BUPIVACAINE HYDROCHLORIDE AND EPINEPHRINE BITARTRATE 30 ML: 5; .005 INJECTION, SOLUTION EPIDURAL; INTRACAUDAL; PERINEURAL at 11:12

## 2019-12-17 RX ADMIN — FENTANYL CITRATE 25 MCG: 50 INJECTION INTRAMUSCULAR; INTRAVENOUS at 04:12

## 2019-12-17 RX ADMIN — GABAPENTIN 300 MG: 300 CAPSULE ORAL at 09:12

## 2019-12-17 RX ADMIN — MUPIROCIN 1 G: 20 OINTMENT TOPICAL at 09:12

## 2019-12-17 RX ADMIN — FENTANYL CITRATE 25 MCG: 50 INJECTION, SOLUTION INTRAMUSCULAR; INTRAVENOUS at 01:12

## 2019-12-17 RX ADMIN — MIDAZOLAM HYDROCHLORIDE 2 MG: 1 INJECTION, SOLUTION INTRAMUSCULAR; INTRAVENOUS at 11:12

## 2019-12-17 RX ADMIN — LIDOCAINE HYDROCHLORIDE 100 MG: 20 INJECTION, SOLUTION INTRAVENOUS at 11:12

## 2019-12-17 RX ADMIN — Medication 10 ML: at 04:12

## 2019-12-17 RX ADMIN — CEFAZOLIN 3 G: 330 INJECTION, POWDER, FOR SOLUTION INTRAMUSCULAR; INTRAVENOUS at 12:12

## 2019-12-17 RX ADMIN — FENTANYL CITRATE 50 MCG: 50 INJECTION, SOLUTION INTRAMUSCULAR; INTRAVENOUS at 12:12

## 2019-12-17 RX ADMIN — HEPARIN SODIUM 5000 UNITS: 5000 INJECTION, SOLUTION INTRAVENOUS; SUBCUTANEOUS at 11:12

## 2019-12-17 RX ADMIN — OXYCODONE HYDROCHLORIDE AND ACETAMINOPHEN 1 TABLET: 5; 325 TABLET ORAL at 04:12

## 2019-12-17 RX ADMIN — Medication 10 MG: at 01:12

## 2019-12-17 RX ADMIN — Medication 10 MG: at 12:12

## 2019-12-17 RX ADMIN — PROPOFOL 150 MG: 10 INJECTION, EMULSION INTRAVENOUS at 12:12

## 2019-12-17 RX ADMIN — SODIUM CHLORIDE: 0.9 INJECTION, SOLUTION INTRAVENOUS at 11:12

## 2019-12-17 RX ADMIN — FENTANYL CITRATE 25 MCG: 50 INJECTION, SOLUTION INTRAMUSCULAR; INTRAVENOUS at 11:12

## 2019-12-17 RX ADMIN — Medication 30 MG: at 11:12

## 2019-12-17 RX ADMIN — FENTANYL CITRATE 25 MCG: 50 INJECTION, SOLUTION INTRAMUSCULAR; INTRAVENOUS at 12:12

## 2019-12-17 RX ADMIN — DEXAMETHASONE SODIUM PHOSPHATE 8 MG: 4 INJECTION, SOLUTION INTRAMUSCULAR; INTRAVENOUS at 12:12

## 2019-12-17 RX ADMIN — PROPOFOL 200 MG: 10 INJECTION, EMULSION INTRAVENOUS at 11:12

## 2019-12-17 RX ADMIN — OXYCODONE HYDROCHLORIDE AND ACETAMINOPHEN 1 TABLET: 5; 325 TABLET ORAL at 09:12

## 2019-12-17 NOTE — ANESTHESIA PROCEDURE NOTES
Erector Spinae Plane Single Injection Block    Patient location during procedure: pre-op   Block not for primary anesthetic.  Reason for block: at surgeon's request and post-op pain management   Post-op Pain Location: right axillary pain  Start time: 12/17/2019 11:24 AM  Timeout: 12/17/2019 11:22 AM   End time: 12/17/2019 11:32 AM    Staffing  Authorizing Provider: Елена Mims MD  Performing Provider: Vipin Green MD    Preanesthetic Checklist  Completed: patient identified, site marked, surgical consent, pre-op evaluation, timeout performed, IV checked, risks and benefits discussed and monitors and equipment checked  Peripheral Block  Patient position: sitting  Prep: ChloraPrep  Patient monitoring: heart rate, cardiac monitor, continuous pulse ox, continuous capnometry and frequent blood pressure checks  Block type: erector spinae plane (Erector Spinae Plane Block)  Laterality: right  Injection technique: single shot  Location: T4-5  Needle  Needle type: Tuohy   Needle gauge: 17 G  Needle length: 3.5 in  Needle localization: anatomical landmarks and ultrasound guidance   -ultrasound image captured on disc.  Assessment  Injection assessment: negative aspiration, negative parasthesia and local visualized surrounding nerve  Paresthesia pain: none  Heart rate change: no  Slow fractionated injection: yes  Additional Notes  Patient tolerated well.  See [facility] RN record for vitals.

## 2019-12-17 NOTE — OP NOTE
DATE OF PROCEDURE: 12/17/2019    SURGEON: Surgeon(s) and Role:     * Shima Whyte MD - Primary    PREOPERATIVE DIAGNOSIS: right male breast cancer    POSTOPERATIVE DIAGNOSIS: same    ANESTHESIA: general    PROCEDURES PERFORMED:   Right axillary lymph node dissection  Resection of excess lateral tissue for closure.      PROCEDURE IN DETAIL:   Paul Li Jr. is a 44 y.o. male brought to the operating room for definitive surgery of cancer of the right breast.  The patient has elected to undergo right breast excisional biopsy.  The patient was informed of the possible risks and complications of the procedure, including but not limited to anesthetic risks, bleeding, infection, and need for additional surgery.  The patient concurred with the proposed plan, and has given informed consent.  The site of surgery was properly noted/marked in the preoperative holding area. Prior to incision, Ancef was administered.    The patient was placed supine and general anesthetic was administered. The right arm and chest were prepped and draped in standard fashion. An  incision was made through the prior mastectomy incision. An axillary dissection was performed with removal of the associated lymph nodes and surrounding adipose tissue. This included levels I and II. This was accomplished by exposing the axillary vein superiorly. Small venous tributaries, lymphatics, and vessels were clipped and ligated or cauterized and divided. The subscapularis muscle was skeletonized. The long thoracic and thoracodorsal neurovascular bundles were identified and preserved. The tissue between the long thoracic and thoracodorsal bundle was removed.  Of note, at the end of the dissection, level 3 nodes were palpated and no abnormal nodes were noted.      The specimen was submitted to pathology. The wound was irrigated. A 19 Czech drain was placed in the axillary space.  There was noted to be excess skin laterally, so additional skin was resected for a  better closure. This skin was sent to pathology.      The skin incision was closed in layers with a 3-0 Vicryl suture for the deep dermis followed by a a running 4-0 monocryl used for the subcuticular closure. The drain was secured with sutures. Dermabond was applied along with a dry bulky gauze dressing.    Instrument, sponge, and needle counts were correct at closure and at the conclusion of the case.       ESTIMATED BLOOD LOSS: 5 ml    COMPLICATIONS: none    DISPOSITION:  PACU--hemodynamically stable    ATTESTATION:  I performed the procedure.

## 2019-12-17 NOTE — ANESTHESIA PROCEDURE NOTES
Intubation  Performed by: Mili Edward MD  Authorized by: Mili Edward MD     Intubation:     Induction:  Intravenous    Intubated:  Postinduction    Mask Ventilation:  Unsatisfactory mask ventilation - proceeded to laryngoscopy    Attempts:  1    Attempted By:  Staff anesthesiologist    Method of Intubation:  Direct    Blade:  Jo Ann 3    Laryngeal View Grade: Grade I - full view of chords      Difficult Airway Encountered?: No      Complications:  None    Airway Device:  Oral endotracheal tube    Airway Device Size:  7.5    Style/Cuff Inflation:  Cuffed    Inflation Amount (mL):  7    Tube secured:  21    Secured at:  The teeth    Placement Verified By:  Capnometry    Complicating Factors:  None    Findings Post-Intubation:  BS equal bilateral and atraumatic/condition of teeth unchanged

## 2019-12-17 NOTE — ANESTHESIA PREPROCEDURE EVALUATION
[facility]  Anesthesia Pre-Operative Evaluation       Patient Name: Paul Li Jr.  YOB: 1975  MRN: [id number]  [facility]: [id number]      Code Status: Prior   Date of Procedure: 12/17/2019  Procedure: Procedure(s) (LRB):  LYMPHADENECTOMY, AXILLARY (Right)  Anesthesia: General  Pre-Operative Diagnosis: Final diagnoses:  None  Proceduralist/Surgeon(s) and Role:     * Shima Whyte MD - Primary    SUBJECTIVE:   Paul Li Jr. is a 44 y.o. male w/ a significant PMHx listed below.    Patient now presents for the above procedure(s). Pt appropriately NPO.     LDA:        Port A Cath Single Lumen 05/24/19 0740 (Active)   Number of days: 207            Closed/Suction Drain 11/22/19 1136 Right Chest Bulb 15 Fr. (Active)   Number of days: 24            Closed/Suction Drain 11/22/19 1137 Right Chest Bulb 15 Fr. (Active)   Number of days: 24      Anesthesia Evaluation      Airway   Mallampati: II  TM distance: Normal, at least 6 cm  Neck ROM: Extension Decreased, Mild  Dental    (+) In tact    Pulmonary    Cardiovascular   (+) hypertension,     Neuro/Psych    (+) seizures well controlled, neuromuscular disease, psychiatric history    GI/Hepatic/Renal      Endo/Other    (+) diabetes mellitus type 2 using insulin, arthritis  Abdominal                  ALLERGIES:   Review of patient's allergies indicates:  No Known Allergies  MEDICATIONS:     Current Outpatient Medications on File Prior to Encounter   Medication Sig Dispense Refill Last Dose    amLODIPine (NORVASC) 10 MG tablet TAKE 1 TABLET (10 MG) BY MOUTH EVERY DAY (Patient taking differently: Take 10 mg by mouth every morning. ) 30 tablet 10 Past Week at Unknown time    atorvastatin (LIPITOR) 20 MG tablet Take 1 tablet (20 mg total) by mouth once daily. 90 tablet 3 Past Week at Unknown time    blood sugar diagnostic Strp To check BG 4 times daily, to use with insurance preferred meter 400 each 3 Taking     blood-glucose meter kit Use as instructed 1 each 0 Taking    flash glucose scanning reader (FREESTYLE MARIANO 14 DAY READER) Misc Check glucose continuousl;y 1 each 0 Taking    flash glucose sensor (FREESTYLE MARIANO 14 DAY SENSOR) Kit Check glucose continously 1 kit 11 Taking    gabapentin (NEURONTIN) 300 MG capsule Take 1 capsule (300 mg total) by mouth 3 (three) times daily. 90 capsule 11 Past Week at Unknown time    hydroCHLOROthiazide (HYDRODIURIL) 25 MG tablet TAKE 1 TABLET BY MOUTH ONCE DAILY 90 tablet 3 Past Week at Unknown time    insulin aspart U-100 (NOVOLOG) 100 unit/mL (3 mL) InPn pen Inject 24 Units into the skin 3 (three) times daily. (Patient taking differently: Inject 16 Units into the skin 3 (three) times daily. ) 64.8 mL 3 12/11/2019    insulin detemir U-100 (LEVEMIR FLEXTOUCH) 100 unit/mL (3 mL) SubQ InPn pen Inject 40 Units into the skin 2 (two) times daily. (Patient taking differently: Inject 36 Units into the skin nightly. ) 24 mL 11 12/12/2019 at 2230    lancets 33 gauge Misc Use 2 (two) times daily with meals. 100 each 11 Taking    lancets 33 gauge Misc To check BG 4 times daily, to use with insurance preferred meter 200 each 11 Taking    lancing device Misc 1 Device by Misc.(Non-Drug; Combo Route) route 2 (two) times daily with meals. 1 each 0 Taking    lidocaine-prilocaine (EMLA) cream Apply topically to affected area 45 minutes before port access 30 g 1 Taking    losartan (COZAAR) 100 MG tablet TAKE 1 TABLET BY MOUTH ONCE DAILY 90 tablet 3 Past Week at Unknown time    metFORMIN (GLUCOPHAGE-XR) 500 MG 24 hr tablet Take 2 tablets (1,000 mg total) by mouth daily with breakfast. (Patient taking differently: Take 1,000 mg by mouth 2 (two) times daily with meals. ) 180 tablet 3 Past Week at Unknown time    ondansetron (ZOFRAN-ODT) 8 MG TbDL Take 1 tablet (8 mg total) by mouth every 12 (twelve) hours as needed. 20 tablet 1 More than a month at Unknown time    oxyCODONE-acetaminophen  "(PERCOCET) 5-325 mg per tablet Take 1 tablet by mouth every 4 (four) hours as needed for Pain. 40 tablet 0 Past Week at Unknown time    pen needle, diabetic (BD ULTRA-FINE TANESHA PEN NEEDLE) 32 gauge x 5/32" Ndle Use as directed with novolog and levemir 100 each 5 Taking    senna (SENOKOT) 8.6 mg tablet Take 1 tablet by mouth once daily.   More than a month at Unknown time    tadalafil (CIALIS) 5 MG tablet Take 2 tablets (10 mg total) by mouth daily as needed for Erectile Dysfunction. 20 tablet 1 Past Week at Unknown time    [DISCONTINUED] losartan-hydrochlorothiazide 100-25 mg (HYZAAR) 100-25 mg per tablet TAKE 1 TABLET BY MOUTH EVERY DAY 90 tablet 3      No current facility-administered medications for this encounter.      Current Outpatient Medications   Medication Sig Dispense Refill    amLODIPine (NORVASC) 10 MG tablet TAKE 1 TABLET (10 MG) BY MOUTH EVERY DAY (Patient taking differently: Take 10 mg by mouth every morning. ) 30 tablet 10    atorvastatin (LIPITOR) 20 MG tablet Take 1 tablet (20 mg total) by mouth once daily. 90 tablet 3    blood sugar diagnostic Strp To check BG 4 times daily, to use with insurance preferred meter 400 each 3    blood-glucose meter kit Use as instructed 1 each 0    flash glucose scanning reader (FREESTYLE MARIANO 14 DAY READER) Misc Check glucose continuousl;y 1 each 0    flash glucose sensor (FREESTYLE MARIANO 14 DAY SENSOR) Kit Check glucose continously 1 kit 11    gabapentin (NEURONTIN) 300 MG capsule Take 1 capsule (300 mg total) by mouth 3 (three) times daily. 90 capsule 11    hydroCHLOROthiazide (HYDRODIURIL) 25 MG tablet TAKE 1 TABLET BY MOUTH ONCE DAILY 90 tablet 3    insulin aspart U-100 (NOVOLOG) 100 unit/mL (3 mL) InPn pen Inject 24 Units into the skin 3 (three) times daily. (Patient taking differently: Inject 16 Units into the skin 3 (three) times daily. ) 64.8 mL 3    insulin detemir U-100 (LEVEMIR FLEXTOUCH) 100 unit/mL (3 mL) SubQ InPn pen Inject 40 Units " "into the skin 2 (two) times daily. (Patient taking differently: Inject 36 Units into the skin nightly. ) 24 mL 11    lancets 33 gauge Misc Use 2 (two) times daily with meals. 100 each 11    lancets 33 gauge Misc To check BG 4 times daily, to use with insurance preferred meter 200 each 11    lancing device Misc 1 Device by Misc.(Non-Drug; Combo Route) route 2 (two) times daily with meals. 1 each 0    lidocaine-prilocaine (EMLA) cream Apply topically to affected area 45 minutes before port access 30 g 1    losartan (COZAAR) 100 MG tablet TAKE 1 TABLET BY MOUTH ONCE DAILY 90 tablet 3    metFORMIN (GLUCOPHAGE-XR) 500 MG 24 hr tablet Take 2 tablets (1,000 mg total) by mouth daily with breakfast. (Patient taking differently: Take 1,000 mg by mouth 2 (two) times daily with meals. ) 180 tablet 3    ondansetron (ZOFRAN-ODT) 8 MG TbDL Take 1 tablet (8 mg total) by mouth every 12 (twelve) hours as needed. 20 tablet 1    oxyCODONE-acetaminophen (PERCOCET) 5-325 mg per tablet Take 1 tablet by mouth every 4 (four) hours as needed for Pain. 40 tablet 0    pen needle, diabetic (BD ULTRA-FINE TANESHA PEN NEEDLE) 32 gauge x 5/32" Ndle Use as directed with novolog and levemir 100 each 5    senna (SENOKOT) 8.6 mg tablet Take 1 tablet by mouth once daily.      tadalafil (CIALIS) 5 MG tablet Take 2 tablets (10 mg total) by mouth daily as needed for Erectile Dysfunction. 20 tablet 1          History:   There are no hospital problems to display for this patient.    Patient Active Problem List   Diagnosis    Essential hypertension    Gout    Malignant neoplasm involving both nipple and areola of right breast in male, estrogen receptor negative    Microcytic anemia    Morbid obesity with BMI of 50.0-59.9, adult    Adjustment disorder with mixed anxiety and depressed mood    Hydrocele    Drug induced constipation    Psychophysiological insomnia    Chemotherapy-induced neuropathy    New onset type 2 diabetes mellitus    " "Steroid-induced diabetes mellitus    Class 3 severe obesity due to excess calories with serious comorbidity and body mass index (BMI) of 50.0 to 59.9 in adult    Male breast cancer    Breast cancer      Past Medical History:   Diagnosis Date    Arthritis     Cancer     Diabetes mellitus     HTN (hypertension)     Leg edema, right     Prediabetes     Seizures     at age 18 - stress related    Therapy     marital counseling     Past Surgical History:   Procedure Laterality Date    AXILLARY NODE DISSECTION Right 11/22/2019    Procedure: LYMPHADENECTOMY, AXILLARY RIGHT;  Surgeon: Shima Whyte MD;  Location: [facility];  Service: General;  Laterality: Right;    INSERTION OF TUNNELED CENTRAL VENOUS CATHETER (CVC) WITH SUBCUTANEOUS PORT Left 5/24/2019    Procedure: [id number];  Surgeon: Shima Whyte MD;  Location: [facility] OR [facility];  Service: General;  Laterality: Left;    SENTINEL LYMPH NODE BIOPSY Right 11/22/2019    Procedure: BIOPSY, LYMPH NODE, SENTINEL RIGHT;  Surgeon: Shima Whyte MD;  Location: [facility];  Service: General;  Laterality: Right;    SIMPLE MASTECTOMY Right 11/22/2019    Procedure: MASTECTOMY, SIMPLE RIGHT (CONSENT AM OF) 2.5 hr case;  Surgeon: Shima Whyte MD;  Location: [facility];  Service: General;  Laterality: Right;     Alcohol use:    Social History     Substance and Sexual Activity   Alcohol Use Yes    Alcohol/week: 0.0 standard drinks    Frequency: Monthly or less    Drinks per session: 1 or 2    Binge frequency: Never    Comment: Rarely          OBJECTIVE:   Last 3 sets of Vitals    Vitals - 1 value per visit 12/12/2019 12/12/2019 12/13/2019   SYSTOLIC 103 - -   DIASTOLIC 59 - -   PULSE - 68 65   TEMPERATURE - 98.1 98.4   RESPIRATIONS - - 20   SPO2 - - 100   Weight (lb) - - 415   Weight (kg) - - 188.243   HEIGHT - 6' 3" 6' 2"   BODY MASS INDEX - 52.36 53.28   VISIT REPORT - - -   Pain Score  - 3 -   Some recent data might be hidden       Vital Signs (Most Recent):    " Vital Signs Range (Last 24H):        No intake or output data in the 24 hours ending 12/17/19 0822    There is no height or weight on file to calculate BMI.     Significant Labs:  Lab Results   Component Value Date    WBC 3.95 11/14/2019    HGB 11.1 (L) 11/14/2019    HCT 35.9 (L) 11/14/2019     11/14/2019    CHOL 148 06/15/2018    TRIG 75 06/15/2018    HDL 32 (L) 06/15/2018    ALT 32 11/14/2019    AST 24 11/14/2019     11/14/2019    K 4.0 11/14/2019     11/14/2019    CREATININE 1.1 11/14/2019    BUN 13 11/14/2019    CO2 26 11/14/2019    TSH 0.898 06/15/2018    HGBA1C 9.8 (H) 10/10/2019     No results found for: PREGTESTUR, PREGSERUM, HCG, HCGQUANT   No LMP for male patient.    EKG:   Results for orders placed or performed during the hospital encounter of 11/14/19   EKG 12-lead    Collection Time: 11/14/19 12:01 PM    Narrative    Test Reason : C50.921,Z01.818,    Vent. Rate : 074 BPM     Atrial Rate : 074 BPM     P-R Int : 176 ms          QRS Dur : 092 ms      QT Int : 376 ms       P-R-T Axes : 041 -29 063 degrees     QTc Int : 417 ms    Normal sinus rhythm  Left axis deviation    Confirmed by Husam Boston MD ([id number]) on 11/15/2019 12:25:02 PM    Referred By: AURA VELAZQUEZ           Confirmed By:Husam Boston MD       TTE:  Results for orders placed or performed during the hospital encounter of 05/20/19   Transthoracic echo (TTE) 2D with Color Flow   Result Value Ref Range    Ascending aorta 3.37 cm    STJ 3.16 cm    AV mean gradient 5.02 mmHg    Ao peak kelvin 1.63 m/s    Ao VTI 30.30 cm    IVRT 0.07 msec    IVS 1.12 (A) 0.6 - 1.1 cm    LA size 4.54 cm    Left Atrium Major Axis 6.24 cm    Left Atrium Minor Axis 6.38 cm    LVIDD 4.72 3.5 - 6.0 cm    LVIDS 2.75 2.1 - 4.0 cm    LVOT diameter 2.23 cm    LVOT peak VTI 31.46 cm    PW 0.97 0.6 - 1.1 cm    MV Peak A Kelvin 0.81 m/s    E wave decelartion time 179.92 msec    MV Peak E Kelvin 0.96 m/s    RA Major Axis 5.77 cm    RA Width 4.68 cm    RVDD 4.92  cm    Sinus 3.89 cm    TAPSE 3.13 cm    TDI SEPTAL 0.10     LA WIDTH 4.71 cm    LV Diastolic Volume 103.33 mL    LV Systolic Volume 28.33 mL    RV S' 15.82 m/s    LVOT peak mamadou 1.95463235872419 m/s    LV SEPTAL E/E' RATIO 9.60     FS 42 %    LA volume 114.68 cm3    LV mass 175.88 g    Left Ventricle Relative Wall Thickness 0.41 cm    AV valve area 4.05 cm2    AV Velocity Ratio 0.80     AV index (prosthetic) 1.04     E/A ratio 1.19     LVOT area 3.90 cm2    LVOT stroke volume 122.81 cm3    AV peak gradient 10.63 mmHg    LV Systolic Volume Index 9.7 mL/m2    LV Diastolic Volume Index 35.21 mL/m2    LA Volume Index 39.1 mL/m2    LV Mass Index 59.9 g/m2    BSA 3.11 m2    Right Atrial Pressure (from IVC) 3 mmHg    Narrative    · Normal left ventricular systolic function. The estimated ejection   fraction is at least 65%. Strain analysis was not performed because of   challenging apical images.  · Normal right ventricular systolic function.  · Normal LV diastolic function.  · Mild left atrial enlargement.  · Normal central venous pressure (3 mm Hg).        No results found for: EF  No results found for this or any previous visit.  CHRISTINE:  No results found for this or any previous visit.  Stress Test:  No results found for this or any previous visit.   LHC:  No results found for this or any previous visit.   PFT:  No results found for: FEV1, FVC, [id number], TLC, DLCO     ASSESSMENT/PLAN:       Pre-op Assessment    I have reviewed the Patient Summary Reports.    I have reviewed the Nursing Notes.   I have reviewed the Medications.     Review of Systems  Anesthesia Hx:  No problems with previous Anesthesia    Social:  Former Smoker, Social Alcohol Use    Hematology/Oncology:         -- Anemia: Current/Recent Cancer. Breast right chemotherapy   EENT/Dental:EENT/Dental Normal   Cardiovascular:   Hypertension    Pulmonary:  Pulmonary Normal    Renal/:   Hydrocele.   Hepatic/GI:  Hepatic/GI Normal    Musculoskeletal:   Arthritis      Neurological:   Neuromuscular Disease, Seizures, well controlled    Endocrine:   Diabetes, type 2, using insulin Gout.   Psych:   Psychiatric History anxiety depression          Physical Exam  General:  Morbid Obesity    Airway/Jaw/Neck:  Airway Findings: Mouth Opening: Normal Tongue: Large  General Airway Assessment: Adult, Possible difficult mask airway, Possible difficult intubation  Mallampati: II  Improves to II with phonation.  TM Distance: Normal, at least 6 cm  Jaw/Neck Findings:  Neck ROM: Extension Decreased, Mild  Neck Findings:  Girth Increased     Eyes/Ears/Nose:  EYES/EARS/NOSE FINDINGS: Normal   Dental:  Dental Findings: In tact   Chest/Lungs:  Chest/Lungs Findings: Clear to auscultation     Heart/Vascular:  Heart Findings: Rhythm: Regular Rhythm  Sounds: Normal  Heart murmur: negative Vascular Findings: Normal    Abdomen:  Abdomen Findings: Normal    Musculoskeletal:  Musculoskeletal Findings: Normal   Skin:  Skin Findings: Normal    Mental Status:  Mental Status Findings:  Cooperative, Alert and Oriented         Anesthesia Plan  Type of Anesthesia, risks & benefits discussed:  Anesthesia Type:  general  Patient's Preference:   Intra-op Monitoring Plan: standard ASA monitors  Intra-op Monitoring Plan Comments:   Post Op Pain Control Plan: per primary service following discharge from PACU and multimodal analgesia  Post Op Pain Control Plan Comments:   Induction:   IV  Beta Blocker:  Patient is on a Beta-Blocker and has received one dose within the past 24 hours (No further documentation required).       Informed Consent: Patient understands risks and agrees with Anesthesia plan.  Questions answered. Anesthesia consent signed with patient.  ASA Score: 3     Day of Surgery Review of History & Physical:    H&P update referred to the surgeon.  H&P completed by Anesthesiologist.   Anesthesia Plan Notes: Chart reviewed, patient interviewed and examined.  The anesthetic plan was explained.  Risks,  benefits, and alternatives were discussed. Questions were answered and the consent was signed.        OMID Edward M.D.         Ready For Surgery From Anesthesia Perspective.

## 2019-12-17 NOTE — TELEPHONE ENCOUNTER
Spoke to Patient.  Arrival time of 0930 given to check in at the [facility] on the [facility]nd Floor of the [facility] on [facility].  Instructed that he can not have any solid food or milk products after 0000, he can have clear liquids between [id number] and then to remain NPO after 0830, to wash up tonight and in the morning with an antibacterial soap, not to wear anything metal or to apply any lotions, powders, or deodorant, and to wear something easy to remove.  Patient verbalized understanding of instructions.    Post-op appointment made.  Address verified and appointment slip mailed.

## 2019-12-18 VITALS
WEIGHT: 315 LBS | HEART RATE: 73 BPM | TEMPERATURE: 98 F | SYSTOLIC BLOOD PRESSURE: 121 MMHG | OXYGEN SATURATION: 94 % | DIASTOLIC BLOOD PRESSURE: 59 MMHG | RESPIRATION RATE: 18 BRPM | HEIGHT: 74 IN | BODY MASS INDEX: 40.43 KG/M2

## 2019-12-18 LAB — POCT GLUCOSE: 103 MG/DL (ref 70–110)

## 2019-12-18 PROCEDURE — 25000003 PHARM REV CODE 250: Performed by: SURGERY

## 2019-12-18 RX ORDER — OXYCODONE AND ACETAMINOPHEN 5; 325 MG/1; MG/1
1 TABLET ORAL EVERY 6 HOURS PRN
Qty: 10 TABLET | Refills: 0 | Status: SHIPPED | OUTPATIENT
Start: 2019-12-18 | End: 2020-06-16

## 2019-12-18 RX ADMIN — SENNOSIDES 1 TABLET: 8.6 TABLET, FILM COATED ORAL at 08:12

## 2019-12-18 RX ADMIN — LOSARTAN POTASSIUM 100 MG: 25 TABLET ORAL at 08:12

## 2019-12-18 RX ADMIN — MUPIROCIN 1 G: 20 OINTMENT TOPICAL at 08:12

## 2019-12-18 RX ADMIN — HYDROCHLOROTHIAZIDE 25 MG: 25 TABLET ORAL at 08:12

## 2019-12-18 RX ADMIN — GABAPENTIN 300 MG: 300 CAPSULE ORAL at 08:12

## 2019-12-18 RX ADMIN — ATORVASTATIN CALCIUM 20 MG: 20 TABLET, FILM COATED ORAL at 08:12

## 2019-12-18 RX ADMIN — AMLODIPINE BESYLATE 10 MG: 10 TABLET ORAL at 06:12

## 2019-12-18 RX ADMIN — OXYCODONE HYDROCHLORIDE AND ACETAMINOPHEN 1 TABLET: 5; 325 TABLET ORAL at 10:12

## 2019-12-18 RX ADMIN — OXYCODONE HYDROCHLORIDE AND ACETAMINOPHEN 1 TABLET: 5; 325 TABLET ORAL at 06:12

## 2019-12-18 NOTE — NURSING
Pt received AVS and teaching on KIKE drain, verbalized understanding,  IV removed, meds delivered to bedside, pt was able to walk and get dressed on his own,  transport request for wheelchair placed.

## 2019-12-18 NOTE — ANESTHESIA POSTPROCEDURE EVALUATION
Anesthesia Post Evaluation    Patient: Paul Li Jr.    Procedure(s) Performed: Procedure(s) (LRB):  LYMPHADENECTOMY, AXILLARY (Right)    Final Anesthesia Type: general    Patient location during evaluation: PACU  Patient participation: Yes- Able to Participate  Level of consciousness: awake and alert  Post-procedure vital signs: reviewed and stable  Pain management: adequate  Airway patency: patent    PONV status at discharge: No PONV  Anesthetic complications: no      Cardiovascular status: blood pressure returned to baseline  Respiratory status: unassisted and spontaneous ventilation  Hydration status: euvolemic  Follow-up not needed.  Comments: I evaluated the patient prior to d/c from PACU            Vitals Value Taken Time   /67 12/18/2019  5:02 AM   Temp 37.1 °C (98.7 °F) 12/18/2019  5:02 AM   Pulse 77 12/18/2019  5:02 AM   Resp 18 12/18/2019  5:02 AM   SpO2 93 % 12/18/2019  5:02 AM         Event Time     Out of Recovery 16:00:00          Pain/Reji Score: Pain Rating Prior to Med Admin: 7 (12/18/2019  6:10 AM)  Pain Rating Post Med Admin: 4 (12/17/2019  6:24 PM)  Reji Score: 10 (12/17/2019  6:24 PM)

## 2019-12-18 NOTE — NURSING TRANSFER
Nursing Transfer Note      12/17/2019     Transfer To: 510[address]    Transfer via stretcher    Transfer with SL    Transported by pct    Medicines sent: no    Chart send with patient: Yes    Notified: mother    Patient reassessed at: 12/17/19@1824hrs

## 2019-12-18 NOTE — PROGRESS NOTES
Food & Nutrition  Education    Diet Education: Consistent Carbohydrate Diet   Learners: Patient and family member      Nutrition Education provided with handouts: Consistent carbohydrate meal planning      Comments: Discussed A1C and consistent carbohydrate diet with patient. Pt stated he has recently been dx with T2DM and does not currently follow a consistent carbohydrate diet at home. He tries to limit intake of high-carbohydrate foods. Provided handout on consistent carbohydrate meal planning to patient.      All questions and concerns answered. Dietitian's contact information provided.       Follow-Up: Yes     Please Re-consult as needed        Thanks!  Matilde Rosario  Dietetic Intern

## 2019-12-18 NOTE — DISCHARGE INSTRUCTIONS
POSTOPERATIVE INSTRUCTIONS FOLLOWING   AXILLARY LYMPH NODE DISSECTION    The following are post-operative instructions that will help you to recover from your surgery.  Please read over these instructions carefully and contact us if we can answer any of your questions or concerns.    Incision Care  You may shower 24 hours after surgery. Do not take a tub bath and do not soak the surgical site. Please do not remove the skin glue that covers your incision.  It will fall/peel off on its own in 2-3 weeks.    Activity    You will be able to do much of your own personal care, such as bathing, dressing, preparing simple meals, etc.   A short walk each day will help with your recovery   You may find that you need to take rest breaks between activities, but you should not need to stay in bed for prolonged periods of time during the day   You may resume light household activities such as simple meal preparation, folding laundry, using your computer, and completing paperwork as you feel ready   Please avoid activities that require moderate to heavy lifting (grocery shopping) or pushing/pulling (vacuuming) and repetitive motions (such as washing windows or long hours on the computer). Do not lift anything heavier than a gallon of milk.   A good rule during this time is to listen to your body, do what is comfortable, and stop and rest when your feel tired.  If it hurts, dont do it.   Following a lymph node dissection, dont avoid using your arm, but dont exercise your arm until after your first post-operative visit.  At your first post-op visit, you will be given arm exercises to regain movement and flexibility.  You may be referred to physical therapy.   You may restart driving when you are no longer on narcotics, your drain has been removed, and you feel safe turning the wheel and stopping quickly.   You will need to be out of work approximately 2-4 weeks depending on your particular surgery and how well you are  recovering.  We will evaluate how you are doing at the first post-op appointment.  This is a good time to ask when you may return to work and what activities you may do.    Medication for pain   You will be given a prescription for pain medication. You should not drive or operate machinery while taking these.  Please take narcotics with food.  Narcotics can cause, or worse, constipation.  You will need to increase your fluid intake, eat high fiber foods (such as fruits and bran) and make sure that you are up and walking. You may need to take an over the counter stool softener for constipation.   An icepack may be helpful to decrease discomfort and swelling, particularly to the armpit after a lymph node dissection.   A small pillow positioned in the armpit may also decrease discomfort after a lymph node dissection.    How to care for your Drain(s)  1. Wash hands--STRIP or milk the drainage tube as it comes out of your body toward the bulb.   a. Beginning where the drain comes out of your body, hold drainage tubing with one hand and with the other, stretch and release tubing an inch at time while moving downward with both hands toward the bulb.  b. Do this 2-3 times before emptying the bulb.  2. Remove the stopper from the bulbs port  (drainage port)  3. Pour the drainage in the measuring cup provided by the nurse  4. Flatten/squeeze the bulb to create a vacuum and replace the stopper before letting go the of the bulb.  5. Record the date, time and amount of drainage in ccs (not ounces) each time bulb is emptied. If you have more than one drain, record each separately.  6. Discard the drainage into the toilet after measuring and then wash hands.  7. Empty bulbs 3 times/day or as needed if it fills up before 8 hours.  8. Remember to bring the output record with you to your doctors appointment.    Please report the following:   Temperature greater than 101 degrees   Discharge or bad odor from the  wound   Excessive bleeding, such as bloody dressing or extreme bruising   Redness at incision and/or drain sites   Swelling or buildup of fluid around incision  If blue dye was used to locate your sentinel lymph nodes, your urine and stool may be blue-green in color for 1 or 2 days.    Additional information  I will see you approximately 2 weeks following your surgery.  If this follow-up appointment has not been made, please call the office.    If you have any questions or problems, please call my office or my nurse.    Dr. Shima Heredia, KANDI  [phone]    After hours and on weekends, you may call the main [facility] line at [phone] and ask to have the general surgery resident paged or have me paged      Lymphedema Risk Reduction    Lymphedema is a swelling of a part of the body, caused by an insufficient lymphatic system and an accumulation of fluid in the bodys tissues.  Lymphedema may occur when normal drainage of fluid is disrupted, such as an infection, injury, cancer, scar tissue, or removal of lymph nodes.    If you had a full axillary node dissection procedure, you may be at great risk for lymphedema.     The following list contains recommendations for reducing your risk of developing lymphedema.    I. Skin Care--avoid trauma/injury to reduce infection risk   Keep the hand and arm on the side of surgery clean and dry   Pay attention to nail care and do not cut cuticles   Avoid punctures, such as injections and blood draws from you on the side of your surgery   Wear gloves while doing activities that may cause skin injury (washing dishes, gardening, etc.)   If scratches or punctures occur, wash area with soap and water, and observe for signs of infections (redness, drainage, swelling)   If a rash, itching, redness, pain, increased skin temperatures, fever, or flu-like symptoms occur, contact your physician immediately for early treatment of a possible  infection  II. Activity/Lifestyle   Gradually build up the duration and intensity of any activity or exercise   Take frequent rest periods during activity to allow for arm recovery   Monitor your arm and upper body during and after activity for any change in size, shape, tissue, texture, soreness, heaviness, or firmness  III. Avoid constriction of your arm on the side of your surgery   Avoid having blood pressure take on the arm on the side of your surgery   Wear loose fitting jewelry and clothing          PLEASE RECORD ALL AMOUNTS IN CCS AND BRIND THIS RECORD   WITH YOU TO YOUR RETURN APPOINTMENT  Date Time Drain #1 Drain #2 comment                                                                                                                                                                                                                         Date Time Drain #1 Drain #2 comment

## 2019-12-18 NOTE — NURSING
Pt arrived to floor with no distress noted.  Respirations even and unlabored.  KIKE drain noted to right axilla.  Dressing clean and dry. No current c/o pain or discomfort.  VS stable.  IV to right hand SL.  Flushes well.  Able to ambulate to restroom without difficulty.  Oriented pt to room.  Family at bedside.  Call light within reach.  Bed locked and lowered for safety.

## 2019-12-18 NOTE — DISCHARGE SUMMARY
[facility]  Discharge Summary      Patient Name: Paul Huynh Jr.  MRN: [id number]  Admission Date: 12/17/2019  Hospital Length of Stay: 0 days  Discharge Date and Time:  12/18/2019 9:05 AM  Attending Physician: Shima Whyte MD   Discharging Provider: Gloria Soliz MD  Primary Care Provider: Kareem Arroyo MD    HPI: This is a 44 y.o. male with a stage pT1c N0 M0 grade 2 ER - [place] - HER2 - invasive ductal carcinoma with lobular features of the right breast. The patient is status post neoadjuvant chemotherapy (completed 10/8/2019) and subsequent right simple mastectomy and sentinel node biopsy on 11/22/19.  Final pathology 6 mm residual IDC, also 1/4 LN positive with 4mm met. He presented for planned right axillary lymph node dissection.    Procedure(s) (LRB):  LYMPHADENECTOMY, AXILLARY (Right)      Indwelling Lines/Drains at time of discharge:   Lines/Drains/Airways     Central Venous Catheter Line                 Port A Cath Single Lumen 05/24/19 0740 208 days          Drain                 Closed/Suction Drain 12/17/19 1437 Right Other (Comment) Bulb 19 Fr. less than 1 day              Hospital Course: PAUL HUYNH JR. 44 y.o.male underwent: Procedure(s) (LRB):  LYMPHADENECTOMY, AXILLARY (Right). Tolerated procedure well, was transferred to  then to regular floor post op. Please see the dictated operative note for further procedure details.   Hospital course was uncomplicated.   Vitals remained stable, afebrile. Labs were reviewed and electrolytes replaced appropriately. Significant lab findings: None  Physical exam was appropriate for post operative state.   Was able to tolerate a regular diet as it was advanced in an appropriate surgical manner.   Was able to ambulate and void without difficulty prior to discharge.  Pain and nausea controlled with PRN medications.   Deemed suitable for discharge on 1 Day Post-Op    Physical Exam   Constitutional: He is oriented to person, place, and  time and well-developed, well-nourished, and in no distress. No distress.   HENT:   Head: Normocephalic and atraumatic.   Eyes: EOM are normal. No scleral icterus.   Neck: Normal range of motion. Neck supple.   Cardiovascular: Normal rate and intact distal pulses.   Pulmonary/Chest: Effort normal. No respiratory distress. Right breast exhibits tenderness (appropriately tender with movement ).       Abdominal: Soft. He exhibits no distension. There is no tenderness.   Musculoskeletal: Normal range of motion. He exhibits no edema.   Neurological: He is alert and oriented to person, place, and time.   Skin: He is not diaphoretic.   Psychiatric: Mood, memory, affect and judgment normal.   Vitals reviewed.        Significant Diagnostic Studies: None    Pending Diagnostic Studies:     Procedure Component Value Units Date/Time    Specimen to Pathology, Surgery General Surgery [[id number]] Collected:  12/17/19 1518    Order Status:  Sent Lab Status:  In process Updated:  12/17/19 1652        Final Active Diagnoses:    Diagnosis Date Noted POA    PRINCIPAL PROBLEM:  Male breast cancer [C50.929] 11/22/2019 Yes      Problems Resolved During this Admission:      Discharged Condition: good    Disposition: Home or Self Care    Follow Up:  Follow-up Information     Shima Whyte MD. Schedule an appointment as soon as possible for a visit in 2 weeks.    Specialties:  Surgery, Breast Surgery  Why:  Post-operative follow up  Contact information:  [facility] [address]  [phone]                 Patient Instructions:        Lifting restrictions   Order Comments: No lifting more than 10-20 lbs for 6 weeks.     No driving until:   Order Comments: No driving while still taking pain medications daily.   Take a stool softener while taking pain medications daily.     Notify your health care provider if you experience any of the following:  increased confusion or weakness      Notify your health care provider if you experience any of the following:  persistent dizziness, light-headedness, or visual disturbances     Notify your health care provider if you experience any of the following:  worsening rash     Notify your health care provider if you experience any of the following:  severe persistent headache     Notify your health care provider if you experience any of the following:  difficulty breathing or increased cough     Notify your health care provider if you experience any of the following:  redness, tenderness, or signs of infection (pain, swelling, redness, odor or green/yellow discharge around incision site)     Notify your health care provider if you experience any of the following:  severe uncontrolled pain     Notify your health care provider if you experience any of the following:  persistent nausea and vomiting or diarrhea     Notify your health care provider if you experience any of the following:  temperature >100.4     No dressing needed   Order Comments: Skin glue will fall off on its own in 2-3 weeks, do not peel it off. Okay to shower at time of discharge, do not submerge incision in water (no swimming or bathing in bathtub) for 2 weeks.     Activity as tolerated     POSTOPERATIVE INSTRUCTIONS FOLLOWING   AXILLARY LYMPH NODE DISSECTION    The following are post-operative instructions that will help you to recover from your surgery.  Please read over these instructions carefully and contact us if we can answer any of your questions or concerns.    Incision Care  You may shower 24 hours after surgery. Do not take a tub bath and do not soak the surgical site. Please do not remove the skin glue that covers your incision.  It will fall/peel off on its own in 2-3 weeks.    Activity    You will be able to do much of your own personal care, such as bathing, dressing, preparing simple meals, etc.   A short walk each day will help with your recovery   You may find that  you need to take rest breaks between activities, but you should not need to stay in bed for prolonged periods of time during the day   You may resume light household activities such as simple meal preparation, folding laundry, using your computer, and completing paperwork as you feel ready   Please avoid activities that require moderate to heavy lifting (grocery shopping) or pushing/pulling (vacuuming) and repetitive motions (such as washing windows or long hours on the computer). Do not lift anything heavier than a gallon of milk.   A good rule during this time is to listen to your body, do what is comfortable, and stop and rest when your feel tired.  If it hurts, dont do it.   Following a lymph node dissection, dont avoid using your arm, but dont exercise your arm until after your first post-operative visit.  At your first post-op visit, you will be given arm exercises to regain movement and flexibility.  You may be referred to physical therapy.   You may restart driving when you are no longer on narcotics, your drain has been removed, and you feel safe turning the wheel and stopping quickly.   You will need to be out of work approximately 2-4 weeks depending on your particular surgery and how well you are recovering.  We will evaluate how you are doing at the first post-op appointment.  This is a good time to ask when you may return to work and what activities you may do.    Medication for pain   You will be given a prescription for pain medication. You should not drive or operate machinery while taking these.  Please take narcotics with food.  Narcotics can cause, or worse, constipation.  You will need to increase your fluid intake, eat high fiber foods (such as fruits and bran) and make sure that you are up and walking. You may need to take an over the counter stool softener for constipation.   An icepack may be helpful to decrease discomfort and swelling, particularly to the armpit after a lymph  node dissection.   A small pillow positioned in the armpit may also decrease discomfort after a lymph node dissection.    How to care for your Drain(s)  1. Wash hands--STRIP or milk the drainage tube as it comes out of your body toward the bulb.   a. Beginning where the drain comes out of your body, hold drainage tubing with one hand and with the other, stretch and release tubing an inch at time while moving downward with both hands toward the bulb.  b. Do this 2-3 times before emptying the bulb.  2. Remove the stopper from the bulbs port  (drainage port)  3. Pour the drainage in the measuring cup provided by the nurse  4. Flatten/squeeze the bulb to create a vacuum and replace the stopper before letting go the of the bulb.  5. Record the date, time and amount of drainage in ccs (not ounces) each time bulb is emptied. If you have more than one drain, record each separately.  6. Discard the drainage into the toilet after measuring and then wash hands.  7. Empty bulbs 3 times/day or as needed if it fills up before 8 hours.  8. Remember to bring the output record with you to your doctors appointment.    Please report the following:   Temperature greater than 101 degrees   Discharge or bad odor from the wound   Excessive bleeding, such as bloody dressing or extreme bruising   Redness at incision and/or drain sites   Swelling or buildup of fluid around incision  If blue dye was used to locate your sentinel lymph nodes, your urine and stool may be blue-green in color for 1 or 2 days.    Additional information  I will see you approximately 2 weeks following your surgery.  If this follow-up appointment has not been made, please call the office.    If you have any questions or problems, please call my office or my nurse.    Dr. Shima Heredia, KANDI  [phone]    After hours and on weekends, you may call the main [facility] line at [phone] and ask to have the general surgery resident paged or  have me paged      Lymphedema Risk Reduction    Lymphedema is a swelling of a part of the body, caused by an insufficient lymphatic system and an accumulation of fluid in the bodys tissues.  Lymphedema may occur when normal drainage of fluid is disrupted, such as an infection, injury, cancer, scar tissue, or removal of lymph nodes.    If you had a full axillary node dissection procedure, you may be at great risk for lymphedema.     The following list contains recommendations for reducing your risk of developing lymphedema.    I. Skin Care--avoid trauma/injury to reduce infection risk   Keep the hand and arm on the side of surgery clean and dry   Pay attention to nail care and do not cut cuticles   Avoid punctures, such as injections and blood draws from you on the side of your surgery   Wear gloves while doing activities that may cause skin injury (washing dishes, gardening, etc.)   If scratches or punctures occur, wash area with soap and water, and observe for signs of infections (redness, drainage, swelling)   If a rash, itching, redness, pain, increased skin temperatures, fever, or flu-like symptoms occur, contact your physician immediately for early treatment of a possible infection  II. Activity/Lifestyle   Gradually build up the duration and intensity of any activity or exercise   Take frequent rest periods during activity to allow for arm recovery   Monitor your arm and upper body during and after activity for any change in size, shape, tissue, texture, soreness, heaviness, or firmness  III. Avoid constriction of your arm on the side of your surgery   Avoid having blood pressure take on the arm on the side of your surgery   Wear loose fitting jewelry and clothing          PLEASE RECORD ALL AMOUNTS IN CCS AND BRIND THIS RECORD   WITH YOU TO YOUR RETURN APPOINTMENT  Date Time Drain #1 Drain #2 comment                                                                                                                                                                                                                          Date Time Drain #1 Drain #2 comment                                                                                                                                                                                                               Medications:  Reconciled Home Medications:      Medication List      CHANGE how you take these medications    amLODIPine 10 MG tablet  Commonly known as:  NORVASC  TAKE 1 TABLET (10 MG) BY MOUTH EVERY DAY  What changed:    · how much to take  · how to take this  · when to take this     Levemir FlexTouch U-100 Insuln 100 unit/mL (3 mL) Inpn pen  Generic drug:  insulin detemir U-100  Inject 40 Units into the skin 2 (two) times daily.  What changed:    · how much to take  · when to take this     metFORMIN 500 MG 24 hr tablet  Commonly known as:  GLUCOPHAGE-XR  Take 2 tablets (1,000 mg total) by mouth daily with breakfast.  What changed:  when to take this     NovoLOG Flexpen U-100 Insulin 100 unit/mL (3 mL) Inpn pen  Generic drug:  insulin aspart U-100  Inject 24 Units into the skin 3 (three) times daily.  What changed:  how much to take     * oxyCODONE-acetaminophen 5-325 mg per tablet  Commonly known as:  PERCOCET  Take 1 tablet by mouth every 4 (four) hours as needed for Pain.  What changed:  Another medication with the same name was added. Make sure you understand how and when to take each.     * oxyCODONE-acetaminophen 5-325 mg per tablet  Commonly known as:  PERCOCET  Take 1 tablet by mouth every 6 (six) hours as needed.  What changed:  You were already taking a medication with the same name, and this prescription was added. Make sure you understand how and when to take each.         * This list has 2 medication(s) that are the same as other medications prescribed for you. Read the directions carefully, and ask your doctor or other care provider to review them  "with you.            CONTINUE taking these medications    atorvastatin 20 MG tablet  Commonly known as:  LIPITOR  Take 1 tablet (20 mg total) by mouth once daily.     BD Ultra-Fine Meme Pen Needle 32 gauge x 5/32" Ndle  Generic drug:  pen needle, diabetic  Use as directed with novolog and levemir     blood sugar diagnostic Strp  To check BG 4 times daily, to use with insurance preferred meter     flash glucose scanning reader Misc  Commonly known as:  FreeStyle [place]xton 14 Day [place]  Check glucose continuousl;y     flash glucose sensor Kit  Commonly known as:  FreeStyle [place] 14 Day Sensor  Check glucose continously     gabapentin 300 MG capsule  Commonly known as:  NEURONTIN  Take 1 capsule (300 mg total) by mouth 3 (three) times daily.     hydroCHLOROthiazide 25 MG tablet  Commonly known as:  HYDRODIURIL  TAKE 1 TABLET BY MOUTH ONCE DAILY     lancing device Misc  1 Device by Misc.(Non-Drug; Combo Route) route 2 (two) times daily with meals.     lidocaine-prilocaine cream  Commonly known as:  EMLA  Apply topically to affected area 45 minutes before port access     losartan 100 MG tablet  Commonly known as:  COZAAR  TAKE 1 TABLET BY MOUTH ONCE DAILY     ondansetron 8 MG Tbdl  Commonly known as:  ZOFRAN-ODT  Take 1 tablet (8 mg total) by mouth every 12 (twelve) hours as needed.     * OneTouch Delica Plus Lancet 33 gauge Misc  Generic drug:  lancets  Use 2 (two) times daily with meals.     * lancets 33 gauge Misc  To check BG 4 times daily, to use with insurance preferred meter     OneTouch UltraMini kit  Generic drug:  blood-glucose meter  Use as instructed     senna 8.6 mg tablet  Commonly known as:  SENOKOT  Take 1 tablet by mouth once daily.     tadalafil 5 MG tablet  Commonly known as:  CIALIS  Take 2 tablets (10 mg total) by mouth daily as needed for Erectile Dysfunction.         * This list has 2 medication(s) that are the same as other medications prescribed for you. Read the directions carefully, and ask your " doctor or other care provider to review them with you.              Time spent on the discharge of patient: 30 minutes      Gloria Soliz MD  [facility]

## 2019-12-18 NOTE — PLAN OF CARE
Problem: Adult Inpatient Plan of Care  Goal: Optimal Comfort and Wellbeing  Outcome: Ongoing, Progressing     Problem: Fall Injury Risk  Goal: Absence of Fall and Fall-Related Injury  Outcome: Ongoing, Progressing

## 2019-12-24 ENCOUNTER — PATIENT MESSAGE (OUTPATIENT)
Dept: HEMATOLOGY/ONCOLOGY | Facility: CLINIC | Age: 44
End: 2019-12-24

## 2019-12-30 DIAGNOSIS — F32.9 REACTIVE DEPRESSION: Primary | ICD-10-CM

## 2019-12-30 RX ORDER — ESCITALOPRAM OXALATE 10 MG/1
10 TABLET ORAL DAILY
Qty: 30 TABLET | Refills: 11 | Status: SHIPPED | OUTPATIENT
Start: 2019-12-30 | End: 2020-02-27

## 2020-01-02 ENCOUNTER — LAB VISIT (OUTPATIENT)
Dept: LAB | Facility: HOSPITAL | Age: 45
End: 2020-01-02
Attending: INTERNAL MEDICINE

## 2020-01-02 DIAGNOSIS — I10 ESSENTIAL HYPERTENSION: ICD-10-CM

## 2020-01-02 DIAGNOSIS — E66.01 MORBID OBESITY WITH BMI OF 50.0-59.9, ADULT: ICD-10-CM

## 2020-01-02 DIAGNOSIS — E11.9 NEW ONSET TYPE 2 DIABETES MELLITUS: ICD-10-CM

## 2020-01-02 LAB
ALBUMIN/CREAT UR: 10.3 UG/MG (ref 0–30)
CREAT UR-MCNC: 97 MG/DL (ref 23–375)
MICROALBUMIN UR DL<=1MG/L-MCNC: 10 UG/ML

## 2020-01-02 PROCEDURE — 82043 UR ALBUMIN QUANTITATIVE: CPT

## 2020-01-02 NOTE — PROGRESS NOTES
Subjective:      Patient ID: Paul Li Jr. is a 44 y.o. male.    Chief Complaint:  Follow-up    History of Present Illness  Paul Li Jr. with  DM2, HTN and obesity presents today for follow up of Type 2 DM. Previous patient of Dr. Elaine. Last seen in Nov 2019. This is his first visit with me.     Has right breast Stage 1B triple negative invasive ductal carcinoma s/p chemotherapy. Developed diabetes while receiving chemotherapy and dexamethasone. He has completed chemotherapy. He had 14 lymph nodes excised in Dec 2019 all negative pathology.  Will start radiation in Feb 2020    He is no longer on steroids since mid Oct 2019.     With regards to the diabetes:    Diagnosed: 10/2019.   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  Nephropathy: -; On ARB.  CAD: none    Current regimen:  Levemir 36 units BID  Novolog 16 units AC with low dose correction scale  Metformin 1000 mg PO BID     Missed doses? Reports full compliance with diabetes regimen.    Other medications tried: none    2-3 # times a day testing  AM: 80-120s  Lunch: 2PM: 115  Dinner: 8PM:   Bedtime: 4-5AM (wife had knee replacement and they are not sleeping) 131  Reports highest  after ate   Log reviewed: none Oral recall, forgot BG log    Denies any hypoglycemia.  Hypoglycemic event? None   Yes, did not need assistance   Knows how to correct with 15 grams of carbs- juice, coke, or a peppermint.     Dietary recall: Eats 2 regular meals daily, skipping breakfast.  L: roast beef sandwich  D: hot dogs; mostly cooked at home  Snacks: chips or pickle  Drinks: water and coke zero     Exercise - tried to stay active. He started walking around Dec 2019.      Education - last visit: 11/4/2019.    Has a Medic alert tag?-none  Glucagon/Baqsimi-none  Recently approved for [facility] financial assistance.     Diabetes Management Status  Statin: Taking  ACE/ARB:  "Taking    Denies personal history of pancreatitis or family history of thyroid cancer.     Lab Results   Component Value Date    HGBA1C 6.2 (H) 01/02/2020    HGBA1C 9.8 (H) 10/10/2019    HGBA1C 5.8 (H) 10/28/2017     Screening or Prevention Patient's value Goal Complete/Controlled?   HgA1C Testing and Control   Lab Results   Component Value Date    HGBA1C 6.2 (H) 01/02/2020      Annually/Less than 8% Yes   Lipid profile : 06/15/2018 Annually No   LDL control Lab Results   Component Value Date    LDLCALC 101.0 06/15/2018    Annually/Less than 100 mg/dl  No   Nephropathy screening Lab Results   Component Value Date    LABMICR 10.0 01/02/2020     Lab Results   Component Value Date    PROTEINUA Negative 10/10/2019    Annually Yes   Blood pressure BP Readings from Last 1 Encounters:   01/07/20 120/70    Less than 140/90 Yes   Dilated retinal exam : 11/11/2019 Annually Yes   Foot exam   : 11/06/2019 Annually Yes     Review of Systems   Constitutional: Negative for fatigue.   Eyes: Negative for visual disturbance.   Respiratory: Negative for shortness of breath.    Cardiovascular: Negative for chest pain.   Gastrointestinal: Negative for abdominal pain.   Musculoskeletal: Negative for arthralgias.   Skin: Negative for wound.   Neurological: Negative for headaches.   Hematological: Does not bruise/bleed easily.   Psychiatric/Behavioral: Negative for sleep disturbance.       Objective:   Physical Exam   Constitutional: He appears well-developed.   Neck: No thyromegaly present.   Cardiovascular: Normal rate.   Pulmonary/Chest: Effort normal.   Abdominal: Soft.   Vitals reviewed.  injection sites are without edema or erythema. No lipo hypertropthy or atrophy  Diabetes Foot Exam:   Denies sores or lesions to bilateral feet  Shoes appropriate  sensation intact to vibration and monofilament    Visit Vitals  /70   Ht 6' 2" (1.88 m)   Wt (!) 182.9 kg (403 lb 3.5 oz)   BMI 51.77 kg/m²        Lab Review:   Lab Results "   Component Value Date    HGBA1C 6.2 (H) 01/02/2020    HGBA1C 9.8 (H) 10/10/2019    HGBA1C 5.8 (H) 10/28/2017      Lab Results   Component Value Date    CHOL 148 06/15/2018    HDL 32 (L) 06/15/2018    LDLCALC 101.0 06/15/2018    TRIG 75 06/15/2018    CHOLHDL 21.6 06/15/2018     Lab Results   Component Value Date     11/14/2019    K 4.0 11/14/2019     11/14/2019    CO2 26 11/14/2019    GLU 88 11/14/2019    BUN 13 11/14/2019    CREATININE 1.1 11/14/2019    CALCIUM 9.2 11/14/2019    PROT 7.1 11/14/2019    ALBUMIN 3.9 11/14/2019    BILITOT 0.6 11/14/2019    ALKPHOS 62 11/14/2019    AST 24 11/14/2019    ALT 32 11/14/2019    ANIONGAP 8 11/14/2019    ESTGFRAFRICA >60 11/14/2019    EGFRNONAA >60 11/14/2019    TSH 0.898 06/15/2018     No results found for: [id number]  Assessment and Plan     1. Uncontrolled type 2 diabetes mellitus with hyperglycemia  Hemoglobin A1c    Comprehensive metabolic panel    dulaglutide (TRULICITY) 0.75 mg/0.5 mL PnIj    dulaglutide (TRULICITY) 1.5 mg/0.5 mL PnIj   2. Morbid obesity with BMI of 50.0-59.9, adult     3. Essential hypertension     4. New onset type 2 diabetes mellitus         New onset type 2 diabetes mellitus  -- Diagnosed: 10/2019.         Uncontrolled type 2 diabetes mellitus with hyperglycemia  -- Reviewed goals of therapy are to get the best control we can without hypoglycemia  Medication changes:   Decrease Novolog to 12 units before meals + SSI   Decrease Levemir to 30 units twice daily  Start Trulicity 0.75mg weekly for 2 weeks & then increase to 1.5 mg weekly indefinitely. Discussed MOA & SE. Return demonstration of how to use pen done in office today.   -- Discussed SGLT2 and will consider when we have logs in one month  -- Discussed my goal is to get him off of insulin  -- He is a  and cannot go back to work on insulin by adding medications and decreasing insulin. Discussed importance of diabetic dietary compliance and exercise.   -- Reviewed  patient's current insulin regimen. Clarified proper insulin dose and timing in relation to meals, etc. Insulin injection sites and proper rotation instructed.    -- Advised frequent self blood glucose monitoring.  Patient encouraged to document glucose results and bring them to every clinic visit    -- Hypoglycemia precautions discussed. Instructed on precautions before driving.    -- Call for Bg repeatedly < 90 or > 180.   -- Close adherence to lifestyle changes recommended.   -- Periodic follow ups for eye evaluations, foot care and dental care suggested.  --Encouraged exercise per ADA guidelines of 150 minutes weekly. Encouraged DM diet and low carb snacks and will give written information.   -- Given instruction on hypoglycemia, low carb snacks and DM drinks  -- Encouraged 3 meals daily to avoid snacking on chips  -- Call for any BG <70      Morbid obesity with BMI of 50.0-59.9, adult  -- Discussed role of weight loss with being able to reduce insulin needs  -- Encouraged exercise as tolerated        Essential hypertension  --Bp at goal on current regimen  -- On ARB    Follow up in about 3 months (around 4/7/2020).    Case discussed with Dr. Elaine  Recommendations were discussed with the patient in detail  The patient verbalized understanding and agrees with the plan outlined as above.

## 2020-01-02 NOTE — PROGRESS NOTES
History:     Reason For Consultation:   1. Stage IB ([id number]) triple negative invasive ductal carcinoma with lobular features of right breast, retroareolar, Grade 2, Ki67 80%, diagnosed 5/2019    Records Obtained: Records of the patients history including those obtained from the referring provider were reviewed and summarized in detail.    HPI:   Paul Li Jr. presents for follow up of breast cancer and hospital follow up. On 11/22/19 Dr whyte performed a right breast mastectomy with SLN biopsy with pathology showing grade 2, 6 mm IDC with extensive treatment effect, no LVI with 1 LN positive 4 mm, negative margins. The on 12/17/19, Dr Whyte performed right axillary dissection with pathology still pending.   Had a fever last night - 100.3 - tender right arm, drain still in place.   He is struggling with depression and started Effexor recently.   Has swelling in right arm.     History: I reviewed, confirmed and updated history (past medical, social, family) as necessary.       Malignant neoplasm involving both nipple and areola of right breast in male, estrogen receptor negative    5/2/2019 Initial Diagnosis     He presented for evaluation of right breast lump which he noticed for one month. Breast imaging showed a mass at 12:00 retroareolar position, confirmed by US as 1.4 cm. Biopsy revealed grade 2 invasive ductal carcinoma with lobular features, triple negative with Ki67 80%.      5/17/2019 Cancer Staged     Staging form: Breast, AJCC 8th Edition  - Clinical stage from 5/17/2019: Stage IB (cT1c, cN0, cM0, G2, ER-, DC-, HER2-) - Signed by Richa Bahena MD on 5/17/2019 5/27/2019 - 7/21/2019 Chemotherapy     Treatment Summary   Plan Name: OP BREAST DOSE-DENSE AC - DOXORUBICIN CYCLOPHOSPHAMIDE Q2W  Treatment Goal: Curative  Status: Inactive  Start Date: 5/27/2019  End Date: 7/9/2019  Provider: Richa Bahena MD  Chemotherapy: DOXOrubicin chemo injection 186 mg, 60 mg/m2 = 186 mg, Intravenous,  Clinic/HOD 1 time, 4 of 4 cycles  Administration: 186 mg (5/27/2019), 186 mg (6/10/2019), 186 mg (6/24/2019), 186 mg (7/8/2019)  cyclophosphamide (CYTOXAN) 600 mg/m2 = 1,865 mg in sodium chloride 0.9% 250 mL chemo infusion, 600 mg/m2 = 1,865 mg, Intravenous, Clinic/HOD 1 time, 4 of 4 cycles  Administration: 1,865 mg (5/27/2019), 1,865 mg (6/10/2019), 1,865 mg (6/24/2019), 1,865 mg (7/8/2019)      7/22/2019 - 10/15/2019 Chemotherapy     Treatment Summary   Plan Name: OP PACLITAXEL QW  Treatment Goal: Curative  Status: Inactive  Start Date: 7/24/2019  End Date: 10/8/2019  Provider: Richa Bahena MD  Chemotherapy: PACLitaxel (TAXOL) 80 mg/m2 = 246 mg in sodium chloride 0.9% 250 mL chemo infusion, 80 mg/m2 = 246 mg, Intravenous, Clinic/HOD 1 time, 12 of 12 cycles  Dose modification: 60 mg/m2 (original dose 80 mg/m2, Cycle 3), 55 mg/m2 (original dose 80 mg/m2, Cycle 7), 60 mg/m2 (original dose 80 mg/m2, Cycle 7), 50 mg/m2 (original dose 80 mg/m2, Cycle 9), 50 mg/m2 (original dose 80 mg/m2, Cycle 10)  Administration: 246 mg (7/24/2019), 246 mg (7/31/2019), 186 mg (8/7/2019), 186 mg (8/14/2019), 186 mg (8/21/2019), 186 mg (8/28/2019), 186 mg (9/4/2019), 174 mg (9/11/2019), 156 mg (9/18/2019), 156 mg (9/25/2019), 156 mg (10/1/2019), 156 mg (10/8/2019)      11/22/2019 Breast Surgery     On 11/22/19 Dr whyte performed a right breast mastectomy with SLN biopsy with pathology showing grade 2, 6 mm IDC with extensive treatment effect, no LVI with 1 LN positive 4 mm, negative margins. The on 12/17/19, Dr Whyte performed right axillary dissection with pathology still pending.      2/28/2020 -  Chemotherapy     Treatment Summary   Plan Name: OP CAPECITABINE 1250MG/M2 BID Q3W  Treatment Goal: Curative  Status: Active  Start Date: 2/28/2020 (Planned)  End Date: 2/28/2020 (Planned)  Provider: Richa Bahena MD  Chemotherapy: [No matching medication found in this treatment plan]           Medications    Current Outpatient  Medications:     amLODIPine (NORVASC) 10 MG tablet, TAKE 1 TABLET (10 MG) BY MOUTH EVERY DAY (Patient taking differently: Take 10 mg by mouth every morning. ), Disp: 30 tablet, Rfl: 10    atorvastatin (LIPITOR) 20 MG tablet, Take 1 tablet (20 mg total) by mouth once daily., Disp: 90 tablet, Rfl: 3    blood sugar diagnostic Strp, To check BG 4 times daily, to use with insurance preferred meter, Disp: 400 each, Rfl: 3    blood-glucose meter kit, Use as instructed, Disp: 1 each, Rfl: 0    escitalopram oxalate (LEXAPRO) 10 MG tablet, Take 1 tablet (10 mg total) by mouth once daily., Disp: 30 tablet, Rfl: 11    flash glucose scanning reader (FREESTYLE MARIANO 14 DAY READER) Misc, Check glucose continuousl;y, Disp: 1 each, Rfl: 0    flash glucose sensor (FREESTYLE MARIANO 14 DAY SENSOR) Kit, Check glucose continously, Disp: 1 kit, Rfl: 11    gabapentin (NEURONTIN) 300 MG capsule, Take 1 capsule (300 mg total) by mouth 3 (three) times daily., Disp: 90 capsule, Rfl: 11    hydroCHLOROthiazide (HYDRODIURIL) 25 MG tablet, TAKE 1 TABLET BY MOUTH ONCE DAILY, Disp: 90 tablet, Rfl: 3    insulin aspart U-100 (NOVOLOG) 100 unit/mL (3 mL) InPn pen, Inject 24 Units into the skin 3 (three) times daily. (Patient taking differently: Inject 16 Units into the skin 3 (three) times daily. ), Disp: 64.8 mL, Rfl: 3    insulin detemir U-100 (LEVEMIR FLEXTOUCH) 100 unit/mL (3 mL) SubQ InPn pen, Inject 40 Units into the skin 2 (two) times daily. (Patient taking differently: Inject 36 Units into the skin nightly. ), Disp: 24 mL, Rfl: 11    lancets 33 gauge Misc, Use 2 (two) times daily with meals., Disp: 100 each, Rfl: 11    lancets 33 gauge Misc, To check BG 4 times daily, to use with insurance preferred meter, Disp: 200 each, Rfl: 11    lancing device Misc, 1 Device by Misc.(Non-Drug; Combo Route) route 2 (two) times daily with meals., Disp: 1 each, Rfl: 0    lidocaine-prilocaine (EMLA) cream, Apply topically to affected area 45  "minutes before port access, Disp: 30 g, Rfl: 1    losartan (COZAAR) 100 MG tablet, TAKE 1 TABLET BY MOUTH ONCE DAILY, Disp: 90 tablet, Rfl: 3    metFORMIN (GLUCOPHAGE-XR) 500 MG 24 hr tablet, Take 2 tablets (1,000 mg total) by mouth daily with breakfast. (Patient taking differently: Take 1,000 mg by mouth 2 (two) times daily with meals. ), Disp: 180 tablet, Rfl: 3    ondansetron (ZOFRAN-ODT) 8 MG TbDL, Take 1 tablet (8 mg total) by mouth every 12 (twelve) hours as needed., Disp: 20 tablet, Rfl: 1    oxyCODONE-acetaminophen (PERCOCET) 5-325 mg per tablet, Take 1 tablet by mouth every 4 (four) hours as needed for Pain., Disp: 40 tablet, Rfl: 0    oxyCODONE-acetaminophen (PERCOCET) 5-325 mg per tablet, Take 1 tablet by mouth every 6 (six) hours as needed., Disp: 10 tablet, Rfl: 0    pen needle, diabetic (BD ULTRA-FINE TANESHA PEN NEEDLE) 32 gauge x 5/32" Ndle, Use as directed with novolog and levemir, Disp: 100 each, Rfl: 5    senna (SENOKOT) 8.6 mg tablet, Take 1 tablet by mouth once daily., Disp: , Rfl:     tadalafil (CIALIS) 5 MG tablet, Take 2 tablets (10 mg total) by mouth daily as needed for Erectile Dysfunction., Disp: 20 tablet, Rfl: 1    sulfamethoxazole-trimethoprim 800-160mg (BACTRIM DS) 800-160 mg Tab, Take 1 tablet by mouth 2 (two) times daily. for 7 days, Disp: 14 tablet, Rfl: 0  Allergies  Review of patient's allergies indicates:  No Known Allergies  Review of Systems  Review of Systems   Constitutional: Positive for fatigue and fever. Negative for activity change, appetite change, chills and unexpected weight change.   HENT: Negative for congestion, hearing loss, nosebleeds, sinus pressure, sinus pain and trouble swallowing.    Eyes: Negative for pain, discharge, itching and visual disturbance.   Respiratory: Negative for cough, chest tightness and shortness of breath.    Cardiovascular: Negative for chest pain, palpitations and leg swelling.   Gastrointestinal: Negative for abdominal distention, " "abdominal pain, blood in stool, constipation, diarrhea, nausea, rectal pain and vomiting.   Endocrine: Negative for heat intolerance and polydipsia.   Genitourinary: Negative for difficulty urinating, dysuria, flank pain, frequency, hematuria and urgency.   Musculoskeletal: Negative for arthralgias, back pain and neck pain.   Skin: Negative for color change, pallor and rash.        Healing incision   Neurological: Positive for numbness. Negative for dizziness and headaches.   Hematological: Negative for adenopathy. Does not bruise/bleed easily.   Psychiatric/Behavioral: Negative for confusion, decreased concentration and sleep disturbance. The patient is not nervous/anxious.         Objective     Objective:     Vitals:    01/03/20 0929   BP: (!) 163/76   BP Location: Left arm   Patient Position: Sitting   BP Method: Large (Automatic)   Pulse: 82   Resp: 18   Temp: 98.4 °F (36.9 °C)   TempSrc: Oral   SpO2: 97%   Weight: (!) 186.2 kg (410 lb 7.9 oz)   Height: 6' 2" (1.88 m)   Body mass index is 52.7 kg/m².    Body surface area is 3.12 meters squared.  Physical Exam   Constitutional: He is oriented to person, place, and time. He appears well-developed and well-nourished. No distress.   HENT:   Head: Normocephalic and atraumatic.   Mouth/Throat: Oropharynx is clear and moist and mucous membranes are normal. No oral lesions.   Eyes: Conjunctivae and EOM are normal. Right eye exhibits no discharge. Left eye exhibits no discharge. No scleral icterus.   Cardiovascular: Normal rate, regular rhythm, S1 normal, S2 normal and normal heart sounds.   Pulmonary/Chest: Effort normal and breath sounds normal. No respiratory distress. He has no wheezes. He has no rhonchi. He has no rales.   Right chest wall status post mastectomy with well healing incisions with drain in place. Warmth and tenderness along incision but no erythema. Drain site with crusting but no erythema.    Left mediport   Abdominal: Soft. Normal appearance and " bowel sounds are normal. There is no tenderness.   Musculoskeletal: Normal range of motion. He exhibits edema (right arm swelling greater than left). He exhibits no deformity.   Neurological: He is alert and oriented to person, place, and time. He has normal strength.   Skin: Skin is warm, dry and intact. No pallor.   See breast   Psychiatric: His behavior is normal. Thought content normal.       Labs and Imaging  Results for orders placed or performed in visit on 01/02/20   Hemoglobin A1c   Result Value Ref Range    Hemoglobin A1C 6.2 (H) 4.0 - 5.6 %    Estimated Avg Glucose 131 68 - 131 mg/dL       Assessment     Assessment:   1. Stage IB (T3mR0C5) invasive ductal carcinoma with lobular features of right breast, retroareolar, ER neg, MS neg, Her2 neg, Grade 2, Ki67 80%, diagnosed 5/2019   * Genetics negative   * 5/27/19 - 10/8/19 completed neoadjuvant ddAC followed by weekly Taxol.    * On 11/22/19 Dr whyte performed a right breast mastectomy with SLN biopsy with pathology showing grade 2, 6 mm IDC with extensive treatment effect, no LVI with 1 LN positive 4 mm, negative margins. The on 12/17/19, Dr Whyte performed right axillary dissection with pathology still pending.   * Will need RT and then Xeloda. Also will see if candidate for  after Xeloda.     2. Microcytic anemia   * Has anemia as baseline but worsened with chemotherapy   * Improving.     3. Adjustment disorder/depression.    * Dr Nunez; On effexor as of late 12/2019    4. Drug induced constipation   * Continue senna S.     5. HTN and Diabetes per PCP    6. Insomnia   * Tried melatonin without success   * Restoril 15 mg nightly for insomnia. Gabapentin which he's getting for PN may help too   * Improving.     7. Chemo neuropathy   * Gabapentin 300 mg tid.     8. Erectile dysfunction   * Cialis    10. Cellulitis, mild   * Bactrim     11. At risk for lymphedema   * Right now looks like post-op swelling still, but will need to monitor.   Plan:      Bactrim  Radiation therapy  Plan Xeloda after RT is complete.    eval after Xeloda  Continue Restoril for insomnia  Continue gabapentin 300 mg tid.   Diabetes management  Follow up in 6 weeks with cbc, cmp, port flush.     Future Appointments   Date Time Provider Department Center   1/3/2020 11:30 AM INJECTION, [facility] INFUSION [facility]   1/6/2020 10:30 AM Shima Whyte MD [facility] BRSTSUR [facility]   1/7/2020 10:00 AM Sonal Eric NP [facility] ENDODIA [facility]   1/23/2020 10:40 AM Kareem Arroyo MD [facility] MED [place]   2/10/2020  1:00 PM Heidy Segal RD, CDE [facility] DIABETE [place] [facility]   2/14/2020  2:30 PM LAB, HEMONC CANCER BLDG [facility] HO [facility]   2/14/2020  3:30 PM Richa Bahena MD [facility] HEM ONC [place]   2/14/2020  4:30 PM [facility], CHEMO [facility]

## 2020-01-03 ENCOUNTER — PATIENT MESSAGE (OUTPATIENT)
Dept: HEMATOLOGY/ONCOLOGY | Facility: CLINIC | Age: 45
End: 2020-01-03

## 2020-01-03 ENCOUNTER — OFFICE VISIT (OUTPATIENT)
Dept: HEMATOLOGY/ONCOLOGY | Facility: CLINIC | Age: 45
End: 2020-01-03

## 2020-01-03 VITALS
HEART RATE: 82 BPM | SYSTOLIC BLOOD PRESSURE: 163 MMHG | TEMPERATURE: 98 F | HEIGHT: 74 IN | RESPIRATION RATE: 18 BRPM | DIASTOLIC BLOOD PRESSURE: 76 MMHG | WEIGHT: 315 LBS | BODY MASS INDEX: 40.43 KG/M2 | OXYGEN SATURATION: 97 %

## 2020-01-03 DIAGNOSIS — Z17.1 MALIGNANT NEOPLASM INVOLVING BOTH NIPPLE AND AREOLA OF RIGHT BREAST IN MALE, ESTROGEN RECEPTOR NEGATIVE: Primary | ICD-10-CM

## 2020-01-03 DIAGNOSIS — Z91.89 AT RISK FOR LYMPHEDEMA: ICD-10-CM

## 2020-01-03 DIAGNOSIS — G62.0 CHEMOTHERAPY-INDUCED NEUROPATHY: ICD-10-CM

## 2020-01-03 DIAGNOSIS — L03.313 CELLULITIS OF CHEST WALL: ICD-10-CM

## 2020-01-03 DIAGNOSIS — C50.021 MALIGNANT NEOPLASM INVOLVING BOTH NIPPLE AND AREOLA OF RIGHT BREAST IN MALE, ESTROGEN RECEPTOR NEGATIVE: Primary | ICD-10-CM

## 2020-01-03 DIAGNOSIS — T45.1X5A CHEMOTHERAPY-INDUCED NEUROPATHY: ICD-10-CM

## 2020-01-03 DIAGNOSIS — I10 ESSENTIAL HYPERTENSION: ICD-10-CM

## 2020-01-03 DIAGNOSIS — F43.23 ADJUSTMENT DISORDER WITH MIXED ANXIETY AND DEPRESSED MOOD: ICD-10-CM

## 2020-01-03 LAB
FINAL PATHOLOGIC DIAGNOSIS: NORMAL
GROSS: NORMAL

## 2020-01-03 PROCEDURE — 99215 PR OFFICE/OUTPT VISIT, EST, LEVL V, 40-54 MIN: ICD-10-PCS | Mod: S$PBB,,, | Performed by: INTERNAL MEDICINE

## 2020-01-03 PROCEDURE — 99999 PR PBB SHADOW E&M-EST. PATIENT-LVL III: CPT | Mod: PBBFAC,,, | Performed by: INTERNAL MEDICINE

## 2020-01-03 PROCEDURE — 99213 OFFICE O/P EST LOW 20 MIN: CPT | Mod: PBBFAC | Performed by: INTERNAL MEDICINE

## 2020-01-03 PROCEDURE — 99999 PR PBB SHADOW E&M-EST. PATIENT-LVL III: ICD-10-PCS | Mod: PBBFAC,,, | Performed by: INTERNAL MEDICINE

## 2020-01-03 PROCEDURE — 99215 OFFICE O/P EST HI 40 MIN: CPT | Mod: S$PBB,,, | Performed by: INTERNAL MEDICINE

## 2020-01-03 RX ORDER — SULFAMETHOXAZOLE AND TRIMETHOPRIM 800; 160 MG/1; MG/1
1 TABLET ORAL 2 TIMES DAILY
Qty: 14 TABLET | Refills: 0 | Status: SHIPPED | OUTPATIENT
Start: 2020-01-03 | End: 2020-01-10

## 2020-01-03 NOTE — PLAN OF CARE
Pt Aax4, breathing even and unlabored, call light in reach, bed in lowest position, wife at bedside throughout shift. Pt ambulated in hallway with wife. Diabetic teaching provided to pt and pt practiced self administration of insulin. Administered medications per orders, pt tolerated well. D/C postponed until tomorrow r/t BG goals. No other significant events throughout shift. VSS, frequent hourly rounding completed.Will continue to monitor.   Patient

## 2020-01-03 NOTE — PLAN OF CARE
DISCONTINUE ON PATHWAY REGIMEN - Breast    [id number]        Doxorubicin (Adriamycin(R))       Cyclophosphamide (Cytoxan(R))       Pegfilgrastim-xxxx     **Always confirm dose/schedule in your pharmacy ordering system**        Paclitaxel (Taxol(R))     **Always confirm dose/schedule in your pharmacy ordering system**    REASON: Continuation Of Treatment  PRIOR TREATMENT: PCI586: Dose-Dense AC-T (Paclitaxel Weekly) - [Doxorubicin +   Cyclophosphamide q14 Days x 4 Cycles, Followed by Paclitaxel 80 mg/m2 Weekly x   12 Weeks]  TREATMENT RESPONSE: Partial Response (DC)    START ON PATHWAY REGIMEN - Breast    [id number]        Capecitabine (Xeloda(R))     **Always confirm dose/schedule in your pharmacy ordering system**    Patient Characteristics:  Post-Neoadjuvant Therapy and Resection, HER2 Negative/Unknown/Equivocal, ER   Negative/Unknown, Residual Disease, Adjuvant Therapy - Residual Disease After   Neoadjuvant Chemotherapy  Therapeutic Status: Post-Neoadjuvant Therapy and Resection  ER Status: Negative (-)  HER2 Status: Negative (-)  DC Status: Negative (-)  Residual Invasive Disease Post-Neoadjuvant Therapy<= Yes  Intent of Therapy:  Curative Intent, Discussed with Patient

## 2020-01-05 ENCOUNTER — PATIENT OUTREACH (OUTPATIENT)
Dept: ADMINISTRATIVE | Facility: OTHER | Age: 45
End: 2020-01-05

## 2020-01-06 ENCOUNTER — PATIENT MESSAGE (OUTPATIENT)
Dept: HEMATOLOGY/ONCOLOGY | Facility: CLINIC | Age: 45
End: 2020-01-06

## 2020-01-06 ENCOUNTER — OFFICE VISIT (OUTPATIENT)
Dept: SURGERY | Facility: CLINIC | Age: 45
End: 2020-01-06
Payer: MEDICAID

## 2020-01-06 VITALS
DIASTOLIC BLOOD PRESSURE: 85 MMHG | SYSTOLIC BLOOD PRESSURE: 138 MMHG | HEIGHT: 74 IN | HEART RATE: 68 BPM | TEMPERATURE: 98 F | BODY MASS INDEX: 52.7 KG/M2

## 2020-01-06 DIAGNOSIS — C50.021: ICD-10-CM

## 2020-01-06 DIAGNOSIS — C50.921 MALIGNANT NEOPLASM OF RIGHT MALE BREAST, UNSPECIFIED ESTROGEN RECEPTOR STATUS, UNSPECIFIED SITE OF BREAST: Primary | ICD-10-CM

## 2020-01-06 DIAGNOSIS — Z17.1: ICD-10-CM

## 2020-01-06 DIAGNOSIS — T45.1X5A CHEMOTHERAPY-INDUCED NEUROPATHY: Primary | ICD-10-CM

## 2020-01-06 DIAGNOSIS — G62.0 CHEMOTHERAPY-INDUCED NEUROPATHY: Primary | ICD-10-CM

## 2020-01-06 PROCEDURE — 99213 OFFICE O/P EST LOW 20 MIN: CPT | Mod: PBBFAC | Performed by: SURGERY

## 2020-01-06 PROCEDURE — 99024 POSTOP FOLLOW-UP VISIT: CPT | Mod: ,,, | Performed by: SURGERY

## 2020-01-06 PROCEDURE — 99024 PR POST-OP FOLLOW-UP VISIT: ICD-10-PCS | Mod: ,,, | Performed by: SURGERY

## 2020-01-06 PROCEDURE — 99999 PR PBB SHADOW E&M-EST. PATIENT-LVL III: CPT | Mod: PBBFAC,,, | Performed by: SURGERY

## 2020-01-06 PROCEDURE — 99999 PR PBB SHADOW E&M-EST. PATIENT-LVL III: ICD-10-PCS | Mod: PBBFAC,,, | Performed by: SURGERY

## 2020-01-06 NOTE — PROGRESS NOTES
REFERRING PHYSICIAN:  No referring provider defined for this encounter.       Kareem Arroyo MD    MEDICAL ONCOLOGIST:    Richa Bahena MD  RADIATION ONCOLOGIST:   pending    DIAGNOSIS:    This is a 44 y.o. male with a stage pT1c N0 M0 grade 2 ER - [place] - HER2 - invasive ductal carcinoma with lobular features of the right breast.    TREATMENT SUMMARY:  The patient is status post neoadjuvant chemotherapy (completed 10/8/2019) and subsequent right simple mastectomy and sentinel node biopsy on 11/22/19.  Final pathology 6 mm residual IDC, also 1/4 LN positive with 4mm met. Underwent an axillary dissection 12/17/19    Pathology:   1. AXILLARY CONTENTS, RIGHT, EXCISION:  - Fourteen lymph node candidates, negative for metastatic carcinoma (0/14).  - Benign soft tissue with fat necrosis, previous procedure site changes, and scar.  - Negative for malignancy.  2. AXILLARY SKIN, RIGHT, EXCISION:  - Benign skin with fat necrosis, previous procedure site changes, and scar.  - Negative for malignancy.      INTERVAL HISTORY:   Paul Li Jr. comes in for a post-op check.  He denies fever, chills, chest pain or shortness of breath.  His pain is well controlled. Drain still putting 25-50cc, swelling in right arm.       MEDICATIONS:  Current Outpatient Medications   Medication Sig Dispense Refill    amLODIPine (NORVASC) 10 MG tablet TAKE 1 TABLET (10 MG) BY MOUTH EVERY DAY (Patient taking differently: Take 10 mg by mouth every morning. ) 30 tablet 10    atorvastatin (LIPITOR) 20 MG tablet Take 1 tablet (20 mg total) by mouth once daily. 90 tablet 3    blood sugar diagnostic Strp To check BG 4 times daily, to use with insurance preferred meter 400 each 3    blood-glucose meter kit Use as instructed 1 each 0    escitalopram oxalate (LEXAPRO) 10 MG tablet Take 1 tablet (10 mg total) by mouth once daily. 30 tablet 11    flash glucose scanning reader (Nonlinear DynamicsSTYLE MARIANO 14 DAY READER) Misc Check glucose continuousl;y 1 each 0     "flash glucose sensor (FREESTYLE MARIANO 14 DAY SENSOR) Kit Check glucose continously 1 kit 11    gabapentin (NEURONTIN) 300 MG capsule Take 1 capsule (300 mg total) by mouth 3 (three) times daily. 90 capsule 11    hydroCHLOROthiazide (HYDRODIURIL) 25 MG tablet TAKE 1 TABLET BY MOUTH ONCE DAILY 90 tablet 3    insulin aspart U-100 (NOVOLOG) 100 unit/mL (3 mL) InPn pen Inject 24 Units into the skin 3 (three) times daily. (Patient taking differently: Inject 16 Units into the skin 3 (three) times daily. ) 64.8 mL 3    insulin detemir U-100 (LEVEMIR FLEXTOUCH) 100 unit/mL (3 mL) SubQ InPn pen Inject 40 Units into the skin 2 (two) times daily. (Patient taking differently: Inject 36 Units into the skin nightly. ) 24 mL 11    lancets 33 gauge Misc Use 2 (two) times daily with meals. 100 each 11    lancets 33 gauge Misc To check BG 4 times daily, to use with insurance preferred meter 200 each 11    lancing device Misc 1 Device by Misc.(Non-Drug; Combo Route) route 2 (two) times daily with meals. 1 each 0    lidocaine-prilocaine (EMLA) cream Apply topically to affected area 45 minutes before port access 30 g 1    losartan (COZAAR) 100 MG tablet TAKE 1 TABLET BY MOUTH ONCE DAILY 90 tablet 3    metFORMIN (GLUCOPHAGE-XR) 500 MG 24 hr tablet Take 2 tablets (1,000 mg total) by mouth daily with breakfast. (Patient taking differently: Take 1,000 mg by mouth 2 (two) times daily with meals. ) 180 tablet 3    ondansetron (ZOFRAN-ODT) 8 MG TbDL Take 1 tablet (8 mg total) by mouth every 12 (twelve) hours as needed. 20 tablet 1    oxyCODONE-acetaminophen (PERCOCET) 5-325 mg per tablet Take 1 tablet by mouth every 4 (four) hours as needed for Pain. 40 tablet 0    oxyCODONE-acetaminophen (PERCOCET) 5-325 mg per tablet Take 1 tablet by mouth every 6 (six) hours as needed. 10 tablet 0    pen needle, diabetic (BD ULTRA-FINE TANESHA PEN NEEDLE) 32 gauge x 5/32" Ndle Use as directed with novolog and levemir 100 each 5    senna (SENOKOT) " 8.6 mg tablet Take 1 tablet by mouth once daily.      sulfamethoxazole-trimethoprim 800-160mg (BACTRIM DS) 800-160 mg Tab Take 1 tablet by mouth 2 (two) times daily. for 7 days 14 tablet 0    tadalafil (CIALIS) 5 MG tablet Take 2 tablets (10 mg total) by mouth daily as needed for Erectile Dysfunction. 20 tablet 1     No current facility-administered medications for this visit.        ALLERGIES:   Review of patient's allergies indicates:  No Known Allergies    PHYSICAL EXAMINATION:   General:  This is a well appearing male with appropriate speech, affect and gait.     Breast:  Incision clean, dry, and intact. No surrounding erythema or drainage from incision.  Some edema in the skin and edema of the right arm.    IMPRESSION:   The patient has had an uneventful postoperative course s/p axillary dissection.    Drain still putting serosanguineous fluid 25-50cc   Mild right arm edema.     PLAN:   Will remove drain in 1 week  Will begin PT/OT   Ok to begin radiation treatment 6-8 weeks postop.

## 2020-01-07 ENCOUNTER — OFFICE VISIT (OUTPATIENT)
Dept: ENDOCRINOLOGY | Facility: CLINIC | Age: 45
End: 2020-01-07

## 2020-01-07 ENCOUNTER — TELEPHONE (OUTPATIENT)
Dept: PHARMACY | Facility: CLINIC | Age: 45
End: 2020-01-07

## 2020-01-07 VITALS
DIASTOLIC BLOOD PRESSURE: 70 MMHG | SYSTOLIC BLOOD PRESSURE: 120 MMHG | WEIGHT: 315 LBS | HEIGHT: 74 IN | BODY MASS INDEX: 40.43 KG/M2

## 2020-01-07 DIAGNOSIS — E11.9 NEW ONSET TYPE 2 DIABETES MELLITUS: ICD-10-CM

## 2020-01-07 DIAGNOSIS — I10 ESSENTIAL HYPERTENSION: ICD-10-CM

## 2020-01-07 DIAGNOSIS — E11.65 UNCONTROLLED TYPE 2 DIABETES MELLITUS WITH HYPERGLYCEMIA: Primary | ICD-10-CM

## 2020-01-07 DIAGNOSIS — E66.01 MORBID OBESITY WITH BMI OF 50.0-59.9, ADULT: ICD-10-CM

## 2020-01-07 PROCEDURE — 99213 OFFICE O/P EST LOW 20 MIN: CPT | Mod: PBBFAC | Performed by: NURSE PRACTITIONER

## 2020-01-07 PROCEDURE — 99214 PR OFFICE/OUTPT VISIT, EST, LEVL IV, 30-39 MIN: ICD-10-PCS | Mod: S$PBB,,, | Performed by: NURSE PRACTITIONER

## 2020-01-07 PROCEDURE — 99999 PR PBB SHADOW E&M-EST. PATIENT-LVL III: ICD-10-PCS | Mod: PBBFAC,,, | Performed by: NURSE PRACTITIONER

## 2020-01-07 PROCEDURE — 99999 PR PBB SHADOW E&M-EST. PATIENT-LVL III: CPT | Mod: PBBFAC,,, | Performed by: NURSE PRACTITIONER

## 2020-01-07 PROCEDURE — 99214 OFFICE O/P EST MOD 30 MIN: CPT | Mod: S$PBB,,, | Performed by: NURSE PRACTITIONER

## 2020-01-07 NOTE — ASSESSMENT & PLAN NOTE
-- Discussed role of weight loss with being able to reduce insulin needs  -- Encouraged exercise as tolerated

## 2020-01-07 NOTE — ASSESSMENT & PLAN NOTE
-- Reviewed goals of therapy are to get the best control we can without hypoglycemia  Medication changes:   Decrease Novolog to 12 units before meals + SSI   Decrease Levemir to 30 units twice daily  Start Trulicity 0.75mg weekly for 2 weeks & then increase to 1.5 mg weekly indefinitely. Discussed MOA & SE. Return demonstration of how to use pen done in office today.   -- Discussed SGLT2 and will consider when we have logs in one month  -- Discussed my goal is to get him off of insulin  -- He is a  and cannot go back to work on insulin by adding medications and decreasing insulin. Discussed importance of diabetic dietary compliance and exercise.   -- Reviewed patient's current insulin regimen. Clarified proper insulin dose and timing in relation to meals, etc. Insulin injection sites and proper rotation instructed.    -- Advised frequent self blood glucose monitoring.  Patient encouraged to document glucose results and bring them to every clinic visit    -- Hypoglycemia precautions discussed. Instructed on precautions before driving.    -- Call for Bg repeatedly < 90 or > 180.   -- Close adherence to lifestyle changes recommended.   -- Periodic follow ups for eye evaluations, foot care and dental care suggested.  --Encouraged exercise per ADA guidelines of 150 minutes weekly. Encouraged DM diet and low carb snacks and will give written information.   -- Given instruction on hypoglycemia, low carb snacks and DM drinks  -- Encouraged 3 meals daily to avoid snacking on chips  -- Call for any BG <70

## 2020-01-07 NOTE — PATIENT INSTRUCTIONS
Decrease Novolog to 12 units before meals + SSI   Decrease Levemir to 30 units twice daily  Start Trulicity 0.75mg weekly for 2 weeks & then increase to 1.5 mg weekly indefinitely. Discussed MOA & SE. Return demonstration of how to use pen done in office today.     Hypoglycemia - Low Blood Sugar    What can you do?  Rule of 15:    Test your blood sugar   If glucose is between 50-70 mg/dL then ingest 15 grams of fast-acting carbs   If glucose is less than 50 mg/dL then ingest 30 grams of fast-acting carbs   Ingest 15 grams of fast-acting carbohydrate - such as:   a. 3-4 glucose tablets  b. 4 oz juice  c. ½ can regular soda pop  d. 15 skittles or mini jelly beans    Re-check your blood sugar in 15 minutes. If its less than 70mg/dl, repeat steps 2 and 3.   If your next meal is more than 1 hour away, eat an additional 15 grams of carbohydrate and 1 ounce of protein (examples include crackers with cheese or one-half of a sandwich with peanut butter). It is important not to eat too much because this can raise your blood sugar above the target level.      After your blood sugar has normalized, think about why you went low. If you notice a pattern of low blood sugars, contact your Diabetes Team. We may need to adjust your medication.    Snacks can be an important part of a balanced, healthy meal plan. They allow you to eat more frequently, feeling full and satisfied throughout the day. Also, they allow you to spread carbohydrates evenly, which may stabilize blood sugars.  Plus, snacks are enjoyable!     The amount of carbohydrate needed at snacks varies. Generally, about 15-30 grams of carbohydrate per snack is recommended.  Below you will find some tasty treats.       0-5 gm carb   Crystal Light   Vitamin Water Zero   Herbal tea, unsweetened   2 tsp peanut butter on celery   1./2 cup sugar-free jell-o   1 sugar-free popsicle   ¼ cup blueberries   8oz Blue Marianela unsweetened almond milk   5 baby carrots & celery  sticks, cucumbers, bell peppers dipped in ¼ cup salsa, 2Tbsp light ranch dressing or 2Tbsp plain Greek yogurt   10 Goldfish crackers   ½ oz low-fat cheese or string cheese   1 closed handful of nuts, unsalted   1 Tbsp of sunflower seeds, unsalted   1 cup Smart Pop popcorn   1 whole grain brown rice cake        15 gm carb   1 small piece of fruit or ½ banana or 1/2 cup lite canned fruit   3 alma rosa cracker squares   3 cups Smart Pop popcorn, top spray butter, Childs lite salt or cinnamon and Truvia   5 Vanilla Wafers   ½ cup low fat, no added sugar ice cream or frozen yogurt (Blue bell, Blue Bunny, Weight Watchers, Skinny Cow)   ½ turkey, ham, or chicken sandwich   ½ c fruit with ½ c Cottage cheese   4-6 unsalted wheat crackers with 1 oz low fat cheese or 1 tbsp peanut butter    30-45 goldfish crackers (depending on flavor)    7-8 Latter day mini brown rice cakes (caramel, apple cinnamon, chocolate)    12 Latter day mini brown rice cakes (cheddar, bbq, ranch)    1/3 cup hummus dip with raw veg   1/2 whole wheat linda, 1Tbsp hummus   Mini Pizza (1/2 whole wheat English muffin, low-fat  cheese, tomato sauce)   100 calorie snack pack (Oreo, Chips Ahoy, Ritz Mix, Baked Cheetos)   4-6 oz. light or Greek Style yogurt (Chobani, Yoplait, Okios, [place])   ½ cup sugar-free pudding     6 in. wheat tortilla or linda oven toasted chips (topped with spray butter flavoring, cinnamon, Truvia OR spray butter, garlic powder, chili powder)    18 BBQ Popchips (available at Target, Whole Foods, Fresh Market)   Quest protein chips     Protein bars (Quest Hero Bars or make sure bars have a 3:1 protein to carb ratio) or glucerna/boost glucose control for meal replacement

## 2020-01-08 ENCOUNTER — PATIENT MESSAGE (OUTPATIENT)
Dept: HEMATOLOGY/ONCOLOGY | Facility: CLINIC | Age: 45
End: 2020-01-08

## 2020-01-09 ENCOUNTER — DOCUMENTATION ONLY (OUTPATIENT)
Dept: SURGERY | Facility: CLINIC | Age: 45
End: 2020-01-09

## 2020-01-09 ENCOUNTER — CLINICAL SUPPORT (OUTPATIENT)
Dept: REHABILITATION | Facility: HOSPITAL | Age: 45
End: 2020-01-09
Attending: SURGERY

## 2020-01-09 DIAGNOSIS — Z17.1 MALIGNANT NEOPLASM INVOLVING BOTH NIPPLE AND AREOLA OF RIGHT BREAST IN MALE, ESTROGEN RECEPTOR NEGATIVE: Primary | ICD-10-CM

## 2020-01-09 DIAGNOSIS — Z91.89 AT RISK FOR LYMPHEDEMA: ICD-10-CM

## 2020-01-09 DIAGNOSIS — M25.611 DECREASED SHOULDER MOBILITY, RIGHT: ICD-10-CM

## 2020-01-09 DIAGNOSIS — Z17.1: ICD-10-CM

## 2020-01-09 DIAGNOSIS — C50.021: ICD-10-CM

## 2020-01-09 DIAGNOSIS — C50.021 MALIGNANT NEOPLASM INVOLVING BOTH NIPPLE AND AREOLA OF RIGHT BREAST IN MALE, ESTROGEN RECEPTOR NEGATIVE: Primary | ICD-10-CM

## 2020-01-09 PROCEDURE — 97140 MANUAL THERAPY 1/> REGIONS: CPT | Performed by: PHYSICAL MEDICINE & REHABILITATION

## 2020-01-09 PROCEDURE — 97110 THERAPEUTIC EXERCISES: CPT | Performed by: PHYSICAL MEDICINE & REHABILITATION

## 2020-01-09 PROCEDURE — 97162 PT EVAL MOD COMPLEX 30 MIN: CPT | Performed by: PHYSICAL MEDICINE & REHABILITATION

## 2020-01-09 NOTE — PROGRESS NOTES
[facility]  Physical Therapy Initial Evaluation    Name: Paul Li Jr.  Clinic Number: [id number]    Therapy Diagnosis:   Encounter Diagnoses   Name Primary?    Malignant neoplasm involving both nipple and areola of right breast in male, estrogen receptor negative Yes    Malignant neoplasm of nipple of right breast in male, estrogen receptor negative     Decreased shoulder mobility, right     At risk for lymphedema      Physician: Shima Whyte MD    Physician Orders: PT Eval and Treat   Medical Diagnosis: right breast cancer  Evaluation Date: 1/9/2020  Authorization Period Expiration: 12/31/20  Plan of Care Certification Period: 2/21/20 (6 weeks)  Visit # / Visits authorized: 1/ 1  Insurance: Self Pay    Time In: 3:10 PM  Time Out: 4:10 PM  Total Billable Time: 60 minutes    Precautions: cancer      History   History of Present Illness: Paul is a 44 y.o. male that presents to  [facility] s/p right breast surgery.   Diagnosis: Right breast IDC, Stage 1b. Grade 2, ER (-), RI (-), HER2 (-)  Chief complaint: tightness and pain in right arm and difficulty raising right arm upward  Surgical History: 12/17/19 right axillary lymph node dissection and resection excess lateral tissue; 11/22/19 right simple mastectomy with SLNB; total of 18 lymph nodes removed  Paul Li Jr.  has a past surgical history that includes Insertion of tunneled central venous catheter (CVC) with subcutaneous port (Left, 5/24/2019); Simple mastectomy (Right, 11/22/2019); [place] lymph node biopsy (Right, 11/22/2019); Axillary node dissection (Right, 11/22/2019); and Axillary node dissection (Right, 12/17/2019).    Chemotherapy: te-adjuvant chemo therapy completed 10/19  Radiation: pending    Past Medical History:   Diagnosis Date    Arthritis     Cancer     R breast    Diabetes mellitus     HTN (hypertension)     Leg edema, right     Prediabetes      Seizures     at age 18 - stress related    Therapy     marital counseling          Medications:  Paul has a current medication list which includes the following prescription(s): amlodipine, atorvastatin, blood sugar diagnostic, blood-glucose meter, dulaglutide, dulaglutide, escitalopram oxalate, flash glucose scanning reader, flash glucose sensor, gabapentin, hydrochlorothiazide, insulin aspart u-100, insulin detemir u-100, lancets, lancets, lancing device, lidocaine-prilocaine, losartan, metformin, ondansetron, oxycodone-acetaminophen, oxycodone-acetaminophen, pen needle, diabetic, senna, sulfamethoxazole-trimethoprim 800-160mg, tadalafil, and losartan-hydrochlorothiazide 100-25 mg.    Allergies:  Review of patient's allergies indicates:  No Known Allergies     Prior Therapy: shoulder about 2 years ago  Social History:  lives with their family  Occupation:   Prior Level of Function: Independent with all ADL's  Current Level of Function: difficulty raising RUE upward    Other Past Medical History: HTN, Diabetes, morbid obesity    Patient's Goals: I want to regain my right arm motion    Hand dominance: right handed    Subjective   Pt states: soreness in right upper arm and forearm  Pain: 0/10 on VAS currently.   Best: 0/10  Worst: 4/10   Pain location: right upper arm and forearm   Objective   Mental status :oriented    Postural examination/scapula alignment: Rounded shoulder and Head forward    Skin Integrity:   Scar Location: right anterior chest, right axilla; drain remains lateral right chest  Appearance: healing, blood tinged drainage  Signs of infection: No  Drainage:blood tinged  Color:reddish    Edema: Moderate  Location: right arm    Axillary Web Syndrome/Cording:   Location: right axilla, medial upper arm and proximal forearm  Degree of Cording: moderate (mild, moderate etc...)   Number of cords present: 1-2    Sensation: WNL right anterior chest and RUE        Range of Motion:     "  Shoulder Range of Motion:   Active /Passive ROM Right Left   Flexion 130 170   Abduction 100 170   Extension 50 70   IR/90deg 80 80   ER/90deg 85 90          Strength: manual muscle test grades below   Upper Extremity Strength   (R) UE (L) UE   Shoulder flexion: 5/5 5/5   Shoulder Abduction: 5/5 5/5   Shoulder IR 5/5 5/5   Shoulder ER 5/5 5/5   Elbow flexion: 5/5 5/5   Elbow extension: 5/5 5/5   Wrist flexion: 5/5 5/5   Wrist extension: 5/5 5/5    5/5 5/5       Baseline Measurements of BL UE's for early detection of Lymphedema:     LANDMARK RIGHT UE LEFT UE DIFFERENCE   E + 8" 55 cm 53 cm 2 cm   E + 6" 51 cm 48 cm 3 cm   E + 4" 48 cm 44 cm 4 cm   E + 2" 45 cm 40 cm 5 cm   Elbow 39.5 cm 37.5 cm 2 cm   W+ 8" 40 cm 37 cm 3 cm   W +  6" 36 cm 32 cm 4 cm   W + 4" 30 cm 27 cm 3 cm   Wrist 22 cm 21 cm 1 cm   DPC 30 cm 28 cm 2 cm   IP Thumb 8.5 cm 8.0. cm 0.5 cm     Functional Mobility (Bed mobility, transfers)  Independent    ADL's:  Difficulty raising RUE upward    Gait Assessment:   - AD used: none  - Assistance: independent  - Distance: community distances     Patient Education Provided   - role of therapy in multi - disciplinary team, goals for therapy  - Pt was educated in lymphedema etiology and management plans.    - Pt was provided with written risk reductions and precautions for managing lymphedema.     Pt has no cultural, educational or language barriers to learning provided.  Treatment and Instruction of Home Exercise Program    Time In: 3:40 PM  Time Out: 4:10 PM    Paul received individual therapeutic exercises to improve postural correction and alignment, stretching and soft tissue mobility, and strengthening for 15 minutes including the following:   Supine Shld Flexion with wand    1 x 5  Butterflies                                     5 x 5"  Seated Active Shld Abd               1 x 5  Seated Scap retractions              5 x 5"  Elbow Flexion/extension             1 x 5  Fist making                   "               1 x 5    Paul received the following manual therapy techniques were performed to increased myofascial/soft tissue length, mobility and pliability, increase PROM, AROM and function as well as to decrease pain for 15 minutes  Bending/streching right axillary, upper arm and forearm cording  Lateral chest stretch  Passive stretch right Shoulder    Patient fitted with Size G tubigrip to RUE, to wear during day to help decrease RUE edema      Written Home Exercises Provided and Patient Education: Handouts given   See EMR under patient instructions for program given  Pt demonstrated good understanding of the education provided. Patient demo good return demo of skill of exercises.    Functional Limitations Reporting      CMS Impairment/Limitation/Restriction for FOTO outcome Survey    Therapist reviewed FOTO scores for Paul Li Jr. on 1/9/2020.   FOTO documents entered into EPIC - see Media section.    Limitations Score: 32%  Category: Carrying           Assessment   This is a 44 y.o. male referred to outpatient physical therapy and presents with a medical diagnosis of right breast cancer and was seen today post-operatively to establish PT plan of care for impairments following surgery including: decreased right shoulder AROM; right axillary, upper arm and forearm cording; increased edema RUE (? Lymphedema).     Pt instructed in HEP this session and was able to perform all exercises given independently. Pt instructed to follow up with therapist if any concerns arise with program established. Pt will continue to benefit from skilled physical therapy to address the impairments stated in chart below, provide patient/family education and to maximize pt's level of independence in home and community environment     Anticipated barriers to physical therapy: Financial     Pt's spiritual, cultural and educational needs considered and pt agreeable to plan of care and goals as stated below:     Medical necessity is  demonstrated by the following IMPAIRMENTS/PROMBLEM LIST:    History  Co-morbidities and personal factors that may impact the plan of care Co-morbidities:   diabetes, financial considerations, high BMI and history of cancer    Personal Factors:   no deficits     moderate   Examination  Body Structures and Functions, activity limitations and participation restrictions that may impact the plan of care Body Regions:   upper extremities    Body Systems:    ROM  edema  scar formation    Participation Restrictions:   Difficulty raising RUE upward    Activity limitations:   Learning and applying knowledge  no deficits    General Tasks and Commands  no deficits    Communication  no deficits    Mobility  lifting and carrying objects    Self care  washing oneself (bathing, drying, washing hands)  dressing    Domestic Life  cooking  doing house work (cleaning house, washing dishes, laundry)    Interactions/Relationships  no deficits    Life Areas  no deficits    Community and Social Life  no deficits         moderate   Clinical Presentation evolving clinical presentation with changing clinical characteristics moderate   Decision Making/ Complexity Score: moderate       Goals: Pt agrees with goals set    Short Term goals: 3 weeks  1. Patient will demonstrate 100% understanding of lymphedema risk reduction practices to include self monitoring for lymphedema. (progressing, not met)  2. Patient will demonstrate independence with Home Exercise program established. (progressing, not met)  3. Pt will increase AROM/PROM in shoulder abduction ROM to 140 degrees on right to improve functional reach, carry, push, pull pain free. (progressing, not met)  4. Pt will increase AROM/PROM in shoulder flexion to 150 degrees on right to improve functional reach, carry, push, pull pain free.(progressing, not met)  5. Decrease girth in R upper arm, forearm, wrist and hand by up to 2cm to allow for improved UE symmetry, clothing choice, ROM, and UE  function. (progressing, not met)      Long Term Goals: 6 weeks   1.  Pt will increase AROM/PROM in shoulder flexion to 170 degrees on right to improve functional reach, carry, push, pull pain free. (progressing, not met)  2. Patient to show decreased girth in R UE by up to 3cm to allow for improved UE symmetry, clothing choice, ROM, and UE function.  (progressing, not met)  3. Pt will demonstrate full/maximized tissue mobility to increase ROM and promote healthy tissue to be pain free at discharge. (progressing, not met)  4. Pt will report decrease in overall worst pain to 2/10 at discharge. (progressing, not met)  5. Pt will increase AROM/PROM in shoulder abduction ROM to 170 degrees on right to improve functional reach, carry, push, pull pain free. (progressing, not met)  6. Patient will report compliance with walking program 5x week for 10 min each day to improve overall cardiovascular function and decrease cancer related fatigue at discharge. (progressing, not met)      Plan   Outpatient physical therapy 2x week for 6 weeks to include the following:   Manual Therapy, Patient Education, Self Care and Therapeutic Exercise.    Plan of care Certification Period: 1/9/2020 to 2/21/20.    Therapist: Vannessa Kasper, PT  1/9/2020

## 2020-01-09 NOTE — PATIENT INSTRUCTIONS
POST OP PATIENT EDUCATION          Lymphedema - Identification and Prevention     Lymphedema - is the swelling of a body area or extremity caused by the accumulation of lymphatic fluid.  There is a risk for lymphedema with the removal of lymph nodes, trauma or radiation therapy.  Treatment of breast cancer often involves surgery: mastectomy or lumpectomy. Some of the lymph nodes in the underarm (called axillary lymph nodes) may be removed and checked to see if they contain cancer cells.     During breast surgery when axillary lymph nodes are removed (with sentinel node biopsy or axillary dissection) or are treated with radiation therapy, the lymphatic system may become impaired. This may prevent lymphatic fluid from leaving the area therefore, causing lymphedema.     Lymphatic fluid is a normal part of the circulatory system. Its function is to remove waste products and to produce cells vital to fighting infection. Swelling occurs when the vessels become restricted and the lymphatic fluid is unable to freely flow through them.  If lymphedema is left untreated, the affected limb could progressively become more swollen, which could lead to hardening of the skin, bulkiness in the limb, infection and impaired wound healing.         There are things you can do to decrease the chance of developing lymphedema.                                          [email or web address]                                                                                                                                                  The information presented is intended for general information and educational purposes. It is not intended to replace the  advice of your health care provider. Contact your health care provider if you believe you have a health problem.                                    ROM: Flexion - Wand (Supine)        Lie on back holding wand. Raise arms over head.   Repeat  _5-10__ times per set. Do _1___ sets per session. Do _2___ sessions per day.  Copyright © I. All rights reserved.                Butterflies    Lie on your back with your hands behind your head. Open your chest by  your elbows apart and gently down. Hold for 5 seconds. Then, bring  your elbows back together. Repeat 2x daily  5-10 reps   Copyright © 1396-4199 [employer]              AROM SHOULDER ABDUCTION  With your affected arm starting at your side  with your thumb pointed upward, raise up  your arm to the side.  Perform 5 -10 times. Perform 2 times a day.  Copyright © 3941-2175 [employer]                Scapular Retraction (Standing)    With arms at sides, squeeze shoulder blades together. Do not shrug shoulders and do not hold your breath. Hold 5 seconds. Repeat 10 times 1 sessions 2 x day.             Ball Squeeze OR Hand pumps       Perform this exercise three (3) times a day for 10 reps.    The ball squeeze or hand pumps helps to prevent or reduce temporary swelling that may occur in the affected arm. This exercise may be performed standing, sitting or while lying in bed. During this exercise the affected arm should be slightly bent and held upward. Support your arm with a pillow if you are uncomfortable holding it up.              AROM: Elbow Flexion / Extension        With left hand palm up, gently bend elbow as far as possible. Then straighten arm as far as possible. Do this in standing.   Repeat 10 times per set. Do 1 sets per session. Do 1-3 sessions per day.

## 2020-01-09 NOTE — PROGRESS NOTES
Sent an In Basket message to Deborah Hyde to ask if there are any possible options for Clinical Trials for residual T1BC as requested by Dr. Whyte in her 12-12-19 Epic note.    Received the following response from KEIRY Hyde:  I am currently following him for Dr. Bahena for possible participation in  once he finishes [employer].    Response forwarded to Dr. Whyte.

## 2020-01-09 NOTE — PLAN OF CARE
[facility]  Physical Therapy Initial Evaluation    Name: Paul Li Jr.  Clinic Number: [id number]    Therapy Diagnosis:   Encounter Diagnoses   Name Primary?    Malignant neoplasm involving both nipple and areola of right breast in male, estrogen receptor negative Yes    Malignant neoplasm of nipple of right breast in male, estrogen receptor negative     Decreased shoulder mobility, right     At risk for lymphedema      Physician: Shima Whyte MD    Physician Orders: PT Eval and Treat   Medical Diagnosis: right breast cancer  Evaluation Date: 1/9/2020  Authorization Period Expiration: 12/31/20  Plan of Care Certification Period: 2/21/20 (6 weeks)  Visit # / Visits authorized: 1/ 1  Insurance: Self Pay    Time In: 3:10 PM  Time Out: 4:10 PM  Total Billable Time: 60 minutes    Precautions: cancer      History   History of Present Illness: Paul is a 44 y.o. male that presents to  [facility] the [facility] s/p right breast surgery.   Diagnosis: Right breast IDC, Stage 1b. Grade 2, ER (-), [place] (-), HER2 (-)  Chief complaint: tightness and pain in right arm and difficulty raising right arm upward  Surgical History: 12/17/19 right axillary lymph node dissection and resection excess lateral tissue; 11/22/19 right simple mastectomy with SLNB; total of 18 lymph nodes removed  Paul Li Jr.  has a past surgical history that includes Insertion of tunneled central venous catheter (CVC) with subcutaneous port (Left, 5/24/2019); Simple mastectomy (Right, 11/22/2019); [place] lymph node biopsy (Right, 11/22/2019); Axillary node dissection (Right, 11/22/2019); and Axillary node dissection (Right, 12/17/2019).    Chemotherapy: te-adjuvant chemo therapy completed 10/19  Radiation: pending    Past Medical History:   Diagnosis Date    Arthritis     Cancer     R breast    Diabetes mellitus     HTN (hypertension)     Leg edema, right     Prediabetes      Seizures     at age 18 - stress related    Therapy     marital counseling          Medications:  Paul has a current medication list which includes the following prescription(s): amlodipine, atorvastatin, blood sugar diagnostic, blood-glucose meter, dulaglutide, dulaglutide, escitalopram oxalate, flash glucose scanning reader, flash glucose sensor, gabapentin, hydrochlorothiazide, insulin aspart u-100, insulin detemir u-100, lancets, lancets, lancing device, lidocaine-prilocaine, losartan, metformin, ondansetron, oxycodone-acetaminophen, oxycodone-acetaminophen, pen needle, diabetic, senna, sulfamethoxazole-trimethoprim 800-160mg, tadalafil, and losartan-hydrochlorothiazide 100-25 mg.    Allergies:  Review of patient's allergies indicates:  No Known Allergies     Prior Therapy: shoulder about 2 years ago  Social History:  lives with their family  Occupation:   Prior Level of Function: Independent with all ADL's  Current Level of Function: difficulty raising RUE upward    Other Past Medical History: HTN, Diabetes, morbid obesity    Patient's Goals: I want to regain my right arm motion    Hand dominance: right handed    Subjective   Pt states: soreness in right upper arm and forearm  Pain: 0/10 on VAS currently.   Best: 0/10  Worst: 4/10   Pain location: right upper arm and forearm   Objective   Mental status :oriented    Postural examination/scapula alignment: Rounded shoulder and Head forward    Skin Integrity:   Scar Location: right anterior chest, right axilla; drain remains lateral right chest  Appearance: healing, blood tinged drainage  Signs of infection: No  Drainage:blood tinged  Color:reddish    Edema: Moderate  Location: right arm    Axillary Web Syndrome/Cording:   Location: right axilla, medial upper arm and proximal forearm  Degree of Cording: moderate (mild, moderate etc...)   Number of cords present: 1-2    Sensation: WNL right anterior chest and RUE        Range of Motion:     "  Shoulder Range of Motion:   Active /Passive ROM Right Left   Flexion 130 170   Abduction 100 170   Extension 50 70   IR/90deg 80 80   ER/90deg 85 90          Strength: manual muscle test grades below   Upper Extremity Strength   (R) UE (L) UE   Shoulder flexion: 5/5 5/5   Shoulder Abduction: 5/5 5/5   Shoulder IR 5/5 5/5   Shoulder ER 5/5 5/5   Elbow flexion: 5/5 5/5   Elbow extension: 5/5 5/5   Wrist flexion: 5/5 5/5   Wrist extension: 5/5 5/5    5/5 5/5       Baseline Measurements of BL UE's for early detection of Lymphedema:     LANDMARK RIGHT UE LEFT UE DIFFERENCE   E + 8" 55 cm 53 cm 2 cm   E + 6" 51 cm 48 cm 3 cm   E + 4" 48 cm 44 cm 4 cm   E + 2" 45 cm 40 cm 5 cm   Elbow 39.5 cm 37.5 cm 2 cm   W+ 8" 40 cm 37 cm 3 cm   W +  6" 36 cm 32 cm 4 cm   W + 4" 30 cm 27 cm 3 cm   Wrist 22 cm 21 cm 1 cm   DPC 30 cm 28 cm 2 cm   IP Thumb 8.5 cm 8.0. cm 0.5 cm     Functional Mobility (Bed mobility, transfers)  Independent    ADL's:  Difficulty raising RUE upward    Gait Assessment:   - AD used: none  - Assistance: independent  - Distance: community distances     Patient Education Provided   - role of therapy in multi - disciplinary team, goals for therapy  - Pt was educated in lymphedema etiology and management plans.    - Pt was provided with written risk reductions and precautions for managing lymphedema.     Pt has no cultural, educational or language barriers to learning provided.  Treatment and Instruction of Home Exercise Program    Time In: 3:40 PM  Time Out: 4:10 PM    Paul received individual therapeutic exercises to improve postural correction and alignment, stretching and soft tissue mobility, and strengthening for 15 minutes including the following:   Supine Shld Flexion with wand    1 x 5  Butterflies                                     5 x 5"  Seated Active Shld Abd               1 x 5  Seated Scap retractions              5 x 5"  Elbow Flexion/extension             1 x 5  Fist making                   "               1 x 5    Paul received the following manual therapy techniques were performed to increased myofascial/soft tissue length, mobility and pliability, increase PROM, AROM and function as well as to decrease pain for 15 minutes  Bending/streching right axillary, upper arm and forearm cording  Lateral chest stretch  Passive stretch right Shoulder    Patient fitted with Size G tubigrip to RUE, to wear during day to help decrease RUE edema      Written Home Exercises Provided and Patient Education: Handouts given   See EMR under patient instructions for program given  Pt demonstrated good understanding of the education provided. Patient demo good return demo of skill of exercises.    Functional Limitations Reporting      CMS Impairment/Limitation/Restriction for FOTO outcome Survey    Therapist reviewed FOTO scores for Paul Li Jr. on 1/9/2020.   FOTO documents entered into EPIC - see Media section.    Limitations Score: 32%  Category: Carrying           Assessment   This is a 44 y.o. male referred to outpatient physical therapy and presents with a medical diagnosis of right breast cancer and was seen today post-operatively to establish PT plan of care for impairments following surgery including: decreased right shoulder AROM; right axillary, upper arm and forearm cording; increased edema RUE (? Lymphedema).     Pt instructed in HEP this session and was able to perform all exercises given independently. Pt instructed to follow up with therapist if any concerns arise with program established. Pt will continue to benefit from skilled physical therapy to address the impairments stated in chart below, provide patient/family education and to maximize pt's level of independence in home and community environment     Anticipated barriers to physical therapy: Financial     Pt's spiritual, cultural and educational needs considered and pt agreeable to plan of care and goals as stated below:     Medical necessity is  demonstrated by the following IMPAIRMENTS/PROMBLEM LIST:    History  Co-morbidities and personal factors that may impact the plan of care Co-morbidities:   diabetes, financial considerations, high BMI and history of cancer    Personal Factors:   no deficits     moderate   Examination  Body Structures and Functions, activity limitations and participation restrictions that may impact the plan of care Body Regions:   upper extremities    Body Systems:    ROM  edema  scar formation    Participation Restrictions:   Difficulty raising RUE upward    Activity limitations:   Learning and applying knowledge  no deficits    General Tasks and Commands  no deficits    Communication  no deficits    Mobility  lifting and carrying objects    Self care  washing oneself (bathing, drying, washing hands)  dressing    Domestic Life  cooking  doing house work (cleaning house, washing dishes, laundry)    Interactions/Relationships  no deficits    Life Areas  no deficits    Community and Social Life  no deficits         moderate   Clinical Presentation evolving clinical presentation with changing clinical characteristics moderate   Decision Making/ Complexity Score: moderate       Goals: Pt agrees with goals set    Short Term goals: 3 weeks  1. Patient will demonstrate 100% understanding of lymphedema risk reduction practices to include self monitoring for lymphedema. (progressing, not met)  2. Patient will demonstrate independence with Home Exercise program established. (progressing, not met)  3. Pt will increase AROM/PROM in shoulder abduction ROM to 140 degrees on right to improve functional reach, carry, push, pull pain free. (progressing, not met)  4. Pt will increase AROM/PROM in shoulder flexion to 150 degrees on right to improve functional reach, carry, push, pull pain free.(progressing, not met)  5. Decrease girth in R upper arm, forearm, wrist and hand by up to 2cm to allow for improved UE symmetry, clothing choice, ROM, and UE  function. (progressing, not met)      Long Term Goals: 6 weeks   1.  Pt will increase AROM/PROM in shoulder flexion to 170 degrees on right to improve functional reach, carry, push, pull pain free. (progressing, not met)  2. Patient to show decreased girth in R UE by up to 3cm to allow for improved UE symmetry, clothing choice, ROM, and UE function.  (progressing, not met)  3. Pt will demonstrate full/maximized tissue mobility to increase ROM and promote healthy tissue to be pain free at discharge. (progressing, not met)  4. Pt will report decrease in overall worst pain to 2/10 at discharge. (progressing, not met)  5. Pt will increase AROM/PROM in shoulder abduction ROM to 170 degrees on right to improve functional reach, carry, push, pull pain free. (progressing, not met)  6. Patient will report compliance with walking program 5x week for 10 min each day to improve overall cardiovascular function and decrease cancer related fatigue at discharge. (progressing, not met)      Plan   Outpatient physical therapy 2x week for 6 weeks to include the following:   Manual Therapy, Patient Education, Self Care and Therapeutic Exercise.    Plan of care Certification Period: 1/9/2020 to 2/21/20.    Therapist: Vannessa Kasper, PT  1/9/2020

## 2020-01-10 ENCOUNTER — TELEPHONE (OUTPATIENT)
Dept: PHARMACY | Facility: CLINIC | Age: 45
End: 2020-01-10

## 2020-01-10 NOTE — TELEPHONE ENCOUNTER
Patient approved with [employer] (Levemir Flextouch, Novolog Pen and Novofine Pen Needles until 01/03/2021.  Medication will be shipped to doctors office within 7-10 business days.

## 2020-01-13 ENCOUNTER — PATIENT OUTREACH (OUTPATIENT)
Dept: ADMINISTRATIVE | Facility: OTHER | Age: 45
End: 2020-01-13

## 2020-01-13 ENCOUNTER — TELEPHONE (OUTPATIENT)
Dept: PHARMACY | Facility: CLINIC | Age: 45
End: 2020-01-13

## 2020-01-13 ENCOUNTER — OFFICE VISIT (OUTPATIENT)
Dept: SURGERY | Facility: CLINIC | Age: 45
End: 2020-01-13

## 2020-01-13 VITALS
TEMPERATURE: 98 F | DIASTOLIC BLOOD PRESSURE: 72 MMHG | HEART RATE: 64 BPM | BODY MASS INDEX: 51.77 KG/M2 | HEIGHT: 74 IN | SYSTOLIC BLOOD PRESSURE: 115 MMHG

## 2020-01-13 DIAGNOSIS — Z17.1: Primary | ICD-10-CM

## 2020-01-13 DIAGNOSIS — C50.021: Primary | ICD-10-CM

## 2020-01-13 PROCEDURE — 99213 OFFICE O/P EST LOW 20 MIN: CPT | Mod: PBBFAC | Performed by: SURGERY

## 2020-01-13 PROCEDURE — 99999 PR PBB SHADOW E&M-EST. PATIENT-LVL III: CPT | Mod: PBBFAC,,, | Performed by: SURGERY

## 2020-01-13 PROCEDURE — 99024 POSTOP FOLLOW-UP VISIT: CPT | Mod: ,,, | Performed by: SURGERY

## 2020-01-13 PROCEDURE — 99999 PR PBB SHADOW E&M-EST. PATIENT-LVL III: ICD-10-PCS | Mod: PBBFAC,,, | Performed by: SURGERY

## 2020-01-13 PROCEDURE — 99024 PR POST-OP FOLLOW-UP VISIT: ICD-10-PCS | Mod: ,,, | Performed by: SURGERY

## 2020-01-13 NOTE — PROGRESS NOTES
Physical Therapy Daily Treatment Note     Name: Paul Li Jr.  Clinic Number: [id number]  Diagnosis:   Encounter Diagnoses   Name Primary?    Decreased shoulder mobility, right     At risk for lymphedema     Malignant neoplasm of nipple of right breast in male, estrogen receptor negative     Malignant neoplasm involving both nipple and areola of right breast in male, estrogen receptor negative      Physician: Dr. Shima Whyte    Precaution: None  Visit #: 2   Time In: 3:10 PM  Time Out: 4:10 PM   Total Treatment Time 1:1: 60 minutes    Evaluation Date: 1/9/202  Visit # authorized:   Authorization period: 12/31/2020  Plan of care Expiration: 2/21/2020  MD referral: Dr. Whyte  Insurance: Self Pay      Subjective     Pt reports: moving right arm better and states tightness decreasing in right arm. States had last drain removed 1/13/20.   Pain Scale: Paul rates pain on a scale of 0/10 on VAS.   Pain location: NA    Fatigue: None  Functional change: raising RUE upward better  Diagnosis: Right breast IDC, Stage 1b. Grade 2, ER (-), [place] (-), HER2 (-)  Chief complaint: tightness and pain in right arm and difficulty raising right arm upward  Surgical History: 12/17/19 right axillary lymph node dissection and resection excess lateral tissue; 11/22/19 right simple mastectomy with SLNB; total of 18 lymph nodes removed  Paul Li Jr.  has a past surgical history that includes Insertion of tunneled central venous catheter (CVC) with subcutaneous port (Left, 5/24/2019); Simple mastectomy (Right, 11/22/2019); [place] lymph node biopsy (Right, 11/22/2019); Axillary node dissection (Right, 11/22/2019); and Axillary node dissection (Right, 12/17/2019).     Chemotherapy: te-adjuvant chemo therapy completed 10/19  Radiation: pending       Objective     Paul received individual therapeutic exercises to improve postural correction and alignment, stretching and soft tissue  "mobility, and strengthening for 30 minutes including the following:   Supine Shld Flexion with wand    1 x 10  LTR with ER                               1 x10  Seated Scap retractions              10 x 5"  Elbow Flexion/extension             1 x 10  Fist making                                 1 x 10  Wall Climbs (B)   Forward       10 x 5"                               Sideways      10 x 5"  ER on Wall                                10 x 5"    Paul received the following manual therapy techniques were performed to increased myofascial/soft tissue length, mobility and pliability, increase PROM, AROM and function as well as to decrease pain for 30 minutes  Bending/streching right axillary, upper arm and forearm cording  Anterior and Lateral chest stretch/STM right chest mastectomy scar  Pectoralis bending stretch with ER  Passive stretch right Shoulder     Patient fitted with Size G tubigrip to RUE, to wear during day to help decrease RUE edema     Shoulder Range of Motion:   Active /Passive ROM Right Left   Flexion 170 170   Abduction 170 170   Extension 70 70   IR/90deg 80 80   ER/90deg 90 90     LANDMARK RUE Diff RIGHT UE LEFT UE DIFFERENCE   Date 1/16/20 1/16/20 1/9/20 1/9/20 1/9/20   E + 8" 55 cm No Chg 55 cm 53 cm 2 cm   E + 6" 47 cm 3 cm dec 51 cm 48 cm 3 cm   E + 4" 47 cm 1 cm dec 48 cm 44 cm 4 cm   E + 2" 43 cm 2 cm dec 45 cm 40 cm 5 cm   Elbow 39 cm 0.5 cm dec 39.5 cm 37.5 cm 2 cm   W+ 8" 39 cm 1 cm dec 40 cm 37 cm 3 cm   W +  6" 35 cm 1 cm dec 36 cm 32 cm 4 cm   W + 4" 30 cm No chg 30 cm 27 cm 3 cm   Wrist 22 cm  No chg 22 cm 21 cm 1 cm   DPC 29.5 cm 0.5 cm dec 30 cm 28 cm 2 cm   IP Thumb 8 cm  0.f cm dec 8.5 cm 8.0. cm 0.5 cm     Functional Limitations Reporting       CMS Impairment/Limitation/Restriction for FOTO outcome Survey     Therapist reviewed FOTO scores for Paul Li Jr. on 1/9/2020.   FOTO documents entered into [employer] - see Media section.     Limitations Score: 32%  Category: Carrying "         Home Exercise Program and Patient Education   Education provided re:  - role of PT in multi - disciplinary team, goals for PT  - progress towards goals     See EMR under notes/patient instructions for HEP given/taught this session - all sets and reps included. Pt received printed copy.     Pt was able to demonstrate and report understanding and performance  Pt has no cultural, educational or language barriers to learning provided.    Assessment     Patient is responding well to physical therapy. Is at expected function and pain level for 4 weeks post operatively.   Pain after treatment: 0/10    Pt prognosis is Good.Patient received manual therapy as above to decrease cording and soft tissue tightness. Cording in right axilla has decreased and mild cording remains in right upper arm, elbow and forearm. Patient performed above exercise program and updated program with new exercises. Improvements noted with right shoulder AROM: 40 degrees with flexion, 70 degrees with Abd, and 20 degrees with Ext. Girth of RUE has decreased from 0.5 cm to 3 cm. Patient instructed to continue with HEP and to continue wearing tubigrip sleeve to decrease RUE edema.  Pt will continue to benefit from skilled outpatient physical therapy to address the deficits listed in the problem list chart on initial evaluation, provide pt/family education and to maximize pt's level of independence in the home and community environment. Patient met 4 STG and 2 LTG this session.    Goals as follows:  Short Term goals: 3 weeks  1. Patient will demonstrate 100% understanding of lymphedema risk reduction practices to include self monitoring for lymphedema. (met, 1/16/20)  2. Patient will demonstrate independence with Home Exercise program established. (met, 1/16/20)  3. Pt will increase AROM/PROM in shoulder abduction ROM to 140 degrees on right to improve functional reach, carry, push, pull pain free. (met, 1/16/20)  4. Pt will increase AROM/PROM in  shoulder flexion to 150 degrees on right to improve functional reach, carry, push, pull pain free.(met, 1/16/20)  5. Decrease girth in R upper arm, forearm, wrist and hand by up to 2cm to allow for improved UE symmetry, clothing choice, ROM, and UE function. (progressing not met, )        Long Term Goals: 6 weeks   1.  Pt will increase AROM/PROM in shoulder flexion to 170 degrees on right to improve functional reach, carry, push, pull pain free. (met, 1/16/20)  2. Patient to show decreased girth in R UE by up to 3cm to allow for improved UE symmetry, clothing choice, ROM, and UE function.  (progressing, not met)  3. Pt will demonstrate full/maximized tissue mobility to increase ROM and promote healthy tissue to be pain free at discharge. (progressing, not met)  4. Pt will report decrease in overall worst pain to 2/10 at discharge. (progressing, not met)  5. Pt will increase AROM/PROM in shoulder abduction ROM to 170 degrees on right to improve functional reach, carry, push, pull pain free. (met, 1/16/20)  6. Patient will report compliance with walking program 5x week for 10 min each day to improve overall cardiovascular function and decrease cancer related fatigue at discharge. (progressing, not met)        Plan     Continue with established POC toward physical therapy goals    Therapist: Vannessa Kasper, PT  1/16/2020

## 2020-01-13 NOTE — TELEPHONE ENCOUNTER
Patient approved with [employer] until 01/13/2021.  Medication will be shipped to doctors office within 7-10 business days.

## 2020-01-13 NOTE — PROGRESS NOTES
REFERRING PHYSICIAN:  No referring provider defined for this encounter.       Kareem Arroyo MD    MEDICAL ONCOLOGIST:    Richa Bahena MD  RADIATION ONCOLOGIST:   pending    DIAGNOSIS:    This is a 44 y.o. male with a stage pT1c N0 M0 grade 2 ER - [place] - HER2 - invasive ductal carcinoma with lobular features of the right breast.    TREATMENT SUMMARY:  The patient is status post neoadjuvant chemotherapy (completed 10/8/2019) and subsequent right simple mastectomy and sentinel node biopsy on 11/22/19.  Final pathology 6 mm residual IDC, also 1/4 LN positive with 4mm met. Underwent an axillary dissection 12/17/19    Pathology:   1. AXILLARY CONTENTS, RIGHT, EXCISION:  - Fourteen lymph node candidates, negative for metastatic carcinoma (0/14).  - Benign soft tissue with fat necrosis, previous procedure site changes, and scar.  - Negative for malignancy.  2. AXILLARY SKIN, RIGHT, EXCISION:  - Benign skin with fat necrosis, previous procedure site changes, and scar.  - Negative for malignancy.      INTERVAL HISTORY:   Paul Li Jr. comes in for a post-op check.  He denies fever, chills, chest pain or shortness of breath.  His pain is well controlled.   Drain output:   1/13/20: 25cc  1/12/20: 30cc  1/11/20: 30cc      MEDICATIONS:  Current Outpatient Medications   Medication Sig Dispense Refill    amLODIPine (NORVASC) 10 MG tablet TAKE 1 TABLET (10 MG) BY MOUTH EVERY DAY (Patient taking differently: Take 10 mg by mouth every morning. ) 30 tablet 10    atorvastatin (LIPITOR) 20 MG tablet Take 1 tablet (20 mg total) by mouth once daily. 90 tablet 3    blood sugar diagnostic Strp To check BG 4 times daily, to use with insurance preferred meter 400 each 3    blood-glucose meter kit Use as instructed 1 each 0    dulaglutide (TRULICITY) 0.75 mg/0.5 mL PnIj Inject 0.5 mLs (0.75 mg total) into the skin every 7 days. Trulicity 0.75mg weekly for 4 weeks & then increase to 1.5 mg weekly indefinitely. 2 mL 0    dulaglutide  (TRULICITY) 1.5 mg/0.5 mL PnIj Inject 1.5 mg into the skin once a week. Trulicity 0.75mg weekly for 4 weeks & then increase to 1.5 mg weekly indefinitely. 6 mL 3    escitalopram oxalate (LEXAPRO) 10 MG tablet Take 1 tablet (10 mg total) by mouth once daily. 30 tablet 11    flash glucose scanning reader (FREESTYLE MARIANO 14 DAY READER) Misc Check glucose continuousl;y 1 each 0    flash glucose sensor (FREESTYLE MARIANO 14 DAY SENSOR) Kit Check glucose continously 1 kit 11    gabapentin (NEURONTIN) 300 MG capsule Take 1 capsule (300 mg total) by mouth 3 (three) times daily. 90 capsule 11    hydroCHLOROthiazide (HYDRODIURIL) 25 MG tablet TAKE 1 TABLET BY MOUTH ONCE DAILY 90 tablet 3    insulin aspart U-100 (NOVOLOG) 100 unit/mL (3 mL) InPn pen Inject 24 Units into the skin 3 (three) times daily. (Patient taking differently: Inject 16 Units into the skin 3 (three) times daily. ) 64.8 mL 3    insulin detemir U-100 (LEVEMIR FLEXTOUCH) 100 unit/mL (3 mL) SubQ InPn pen Inject 40 Units into the skin 2 (two) times daily. (Patient taking differently: Inject 36 Units into the skin nightly. ) 24 mL 11    lancets 33 gauge Misc Use 2 (two) times daily with meals. 100 each 11    lancets 33 gauge Misc To check BG 4 times daily, to use with insurance preferred meter 200 each 11    lancing device Misc 1 Device by Misc.(Non-Drug; Combo Route) route 2 (two) times daily with meals. 1 each 0    lidocaine-prilocaine (EMLA) cream Apply topically to affected area 45 minutes before port access 30 g 1    losartan (COZAAR) 100 MG tablet TAKE 1 TABLET BY MOUTH ONCE DAILY 90 tablet 3    metFORMIN (GLUCOPHAGE-XR) 500 MG 24 hr tablet Take 2 tablets (1,000 mg total) by mouth daily with breakfast. (Patient taking differently: Take 1,000 mg by mouth 2 (two) times daily with meals. ) 180 tablet 3    ondansetron (ZOFRAN-ODT) 8 MG TbDL Take 1 tablet (8 mg total) by mouth every 12 (twelve) hours as needed. 20 tablet 1     "oxyCODONE-acetaminophen (PERCOCET) 5-325 mg per tablet Take 1 tablet by mouth every 4 (four) hours as needed for Pain. 40 tablet 0    oxyCODONE-acetaminophen (PERCOCET) 5-325 mg per tablet Take 1 tablet by mouth every 6 (six) hours as needed. 10 tablet 0    pen needle, diabetic (BD ULTRA-FINE TANESHA PEN NEEDLE) 32 gauge x 5/32" Ndle Use as directed with novolog and levemir 100 each 5    senna (SENOKOT) 8.6 mg tablet Take 1 tablet by mouth once daily.      tadalafil (CIALIS) 5 MG tablet Take 2 tablets (10 mg total) by mouth daily as needed for Erectile Dysfunction. 20 tablet 1     No current facility-administered medications for this visit.        ALLERGIES:   Review of patient's allergies indicates:  No Known Allergies    PHYSICAL EXAMINATION:   General:  This is a well appearing male with appropriate speech, affect and gait.     Breast:  Incision clean, dry, and intact. No surrounding erythema or drainage from incision.  Some edema in the skin and edema of the right arm.    IMPRESSION:   The patient has had an uneventful postoperative course s/p axillary dissection.    Drain still putting serosanguineous fluid 25-50cc   Mild right arm edema.     PLAN:   Will remove drain in 1 week  Will begin PT/OT   Ok to begin radiation treatment 6-8 weeks postop.   "

## 2020-01-14 ENCOUNTER — TUMOR BOARD CONFERENCE (OUTPATIENT)
Dept: SURGERY | Facility: CLINIC | Age: 45
End: 2020-01-14

## 2020-01-15 NOTE — PROGRESS NOTES
Malignant neoplasm involving both nipple and areola of right breast in male, estrogen receptor negative    5/1/2019 Imaging Significant Findings     Mammogram and US  Impression:  Right  Mass: Right breast 14 mm mass at the retroareolar anterior position. Assessment: 4 - Suspicious finding. Biopsy is recommended.      Left  There is no mammographic or sonographic evidence of malignancy.          Recommendation:  Biopsy is recommended.      5/2/2019 Biopsy     BREAST, RIGHT, RETROAREOLAR MASS, ULTRASOUND-GUIDED BIOPSY:  Invasive ductal carcinoma with lobular features.  Size of invasive carcinoma: 5 MM in greatest linear dimension within a single      5/2/2019 Breast Tumor Markers     Estrogen: Negative  Progesterone: Negative  HER2: Negative  Ki67: 80      5/2/2019 Cancer Staged     Staging form: Breast, AJCC 8th Edition  - Clinical stage from 5/17/2019: Stage IB (cT1c, cN0, cM0, G2, ER-, TN-, HER2-) - Signed by Richa Bahena MD on 5/17/2019 5/27/2019 - 7/21/2019 Chemotherapy     Treatment Summary   Plan Name: OP BREAST DOSE-DENSE AC - DOXORUBICIN CYCLOPHOSPHAMIDE Q2W  Treatment Goal: Curative  Status: Inactive  Start Date: 5/27/2019  End Date: 7/9/2019  Provider: Richa Bahena MD  Chemotherapy: DOXOrubicin chemo injection 186 mg, 60 mg/m2 = 186 mg, Intravenous, Clinic/[facility] 1 time, 4 of 4 cycles  Administration: 186 mg (5/27/2019), 186 mg (6/10/2019), 186 mg (6/24/2019), 186 mg (7/8/2019)  cyclophosphamide (CYTOXAN) 600 mg/m2 = 1,865 mg in sodium chloride 0.9% 250 mL chemo infusion, 600 mg/m2 = 1,865 mg, Intravenous, Clinic/[facility] 1 time, 4 of 4 cycles  Administration: 1,865 mg (5/27/2019), 1,865 mg (6/10/2019), 1,865 mg (6/24/2019), 1,865 mg (7/8/2019)      7/22/2019 - 10/15/2019 Chemotherapy     Treatment Summary   Plan Name: OP PACLITAXEL QW  Treatment Goal: Curative  Status: Inactive  Start Date: 7/24/2019  End Date: 10/8/2019  Provider: Richa Bahena MD  Chemotherapy: PACLitaxel (TAXOL) 80 mg/m2 =  246 mg in sodium chloride 0.9% 250 mL chemo infusion, 80 mg/m2 = 246 mg, Intravenous, Clinic[facility] 1 time, 12 of 12 cycles  Dose modification: 60 mg/m2 (original dose 80 mg/m2, Cycle 3), 55 mg/m2 (original dose 80 mg/m2, Cycle 7), 60 mg/m2 (original dose 80 mg/m2, Cycle 7), 50 mg/m2 (original dose 80 mg/m2, Cycle 9), 50 mg/m2 (original dose 80 mg/m2, Cycle 10)  Administration: 246 mg (7/24/2019), 246 mg (7/31/2019), 186 mg (8/7/2019), 186 mg (8/14/2019), 186 mg (8/21/2019), 186 mg (8/28/2019), 186 mg (9/4/2019), 174 mg (9/11/2019), 156 mg (9/18/2019), 156 mg (9/25/2019), 156 mg (10/1/2019), 156 mg (10/8/2019)      11/22/2019 Breast Surgery     On 11/22/19 Dr whyte performed a right breast mastectomy with SLN biopsy with pathology showing grade 2, 6 mm IDC with extensive treatment effect, no LVI with 1 LN positive 4 mm, negative margins. The on 12/17/19, Dr Whyte performed right axillary dissection with pathology still pending.      11/22/2019 Cancer Staged     ypT1b N1      12/17/2019 Breast Surgery     Surgery: Dr Shima Whyte, [phone] performed right ALND with pathology showing 14 negative lymph nodes.           1/14/2020 Tumor Conference        Post mastectomy radiation therapy: Xeloda, then possible Keytruda trial .      2/28/2020 -  Chemotherapy     Treatment Summary   Plan Name: OP CAPECITABINE 1250MG/M2 BID Q3W  Treatment Goal: Curative  Status: Active  Start Date: 2/28/2020 (Planned)  End Date: 2/28/2020 (Planned)  Provider: Richa Bahena MD  Chemotherapy: [No matching medication found in this treatment plan]

## 2020-01-16 ENCOUNTER — CLINICAL SUPPORT (OUTPATIENT)
Dept: REHABILITATION | Facility: HOSPITAL | Age: 45
End: 2020-01-16

## 2020-01-16 DIAGNOSIS — Z17.1 MALIGNANT NEOPLASM INVOLVING BOTH NIPPLE AND AREOLA OF RIGHT BREAST IN MALE, ESTROGEN RECEPTOR NEGATIVE: ICD-10-CM

## 2020-01-16 DIAGNOSIS — M25.611 DECREASED SHOULDER MOBILITY, RIGHT: ICD-10-CM

## 2020-01-16 DIAGNOSIS — Z91.89 AT RISK FOR LYMPHEDEMA: ICD-10-CM

## 2020-01-16 DIAGNOSIS — Z17.1: ICD-10-CM

## 2020-01-16 DIAGNOSIS — C50.021: ICD-10-CM

## 2020-01-16 DIAGNOSIS — C50.021 MALIGNANT NEOPLASM INVOLVING BOTH NIPPLE AND AREOLA OF RIGHT BREAST IN MALE, ESTROGEN RECEPTOR NEGATIVE: ICD-10-CM

## 2020-01-16 PROCEDURE — 97110 THERAPEUTIC EXERCISES: CPT | Performed by: PHYSICAL MEDICINE & REHABILITATION

## 2020-01-16 PROCEDURE — 97140 MANUAL THERAPY 1/> REGIONS: CPT | Performed by: PHYSICAL MEDICINE & REHABILITATION

## 2020-01-16 NOTE — PATIENT INSTRUCTIONS
Gentle Lower trunk rotation with butterfly stretch - to left side   1 x 10 reps  To left side.         Wall Climb    Perform this exercise two (2) times a day with ten (10) repetitions.    Stand and FACE THE WALL with your toes 10 to 12 inches away from the wall.    a. Holding a towel roll in both hands on the wall and slowly slide up the wall as high as possible.  b. Hold this stretch for 5 seconds then move your fingers back down the wall.  c. Try to go a little higher each time. It may relax you a bit if you rest your head against the wall.          Wall Walk (Shoulder Abduction)  Using your affected arm, walk your hand up  a wall straight out to the side, as high as you  are able and then lean your body in toward the wall.   Hold for count of 5. Perform 10 times./2 times a day.  Copyright © 9203-1283 [employer]    Shoulder External Rotation - on Wall    righ hand on wall, rotate shoulder to the left by stepping outward.  Hold 5 seconds - repeat 5-10x.  Do 2 sessions per day.

## 2020-01-20 ENCOUNTER — CLINICAL SUPPORT (OUTPATIENT)
Dept: REHABILITATION | Facility: HOSPITAL | Age: 45
End: 2020-01-20

## 2020-01-20 ENCOUNTER — INITIAL CONSULT (OUTPATIENT)
Dept: RADIATION ONCOLOGY | Facility: CLINIC | Age: 45
End: 2020-01-20

## 2020-01-20 VITALS
SYSTOLIC BLOOD PRESSURE: 136 MMHG | HEART RATE: 71 BPM | WEIGHT: 315 LBS | TEMPERATURE: 98 F | HEIGHT: 74 IN | RESPIRATION RATE: 18 BRPM | BODY MASS INDEX: 40.43 KG/M2 | DIASTOLIC BLOOD PRESSURE: 63 MMHG

## 2020-01-20 DIAGNOSIS — C50.021 MALIGNANT NEOPLASM INVOLVING BOTH NIPPLE AND AREOLA OF RIGHT BREAST IN MALE, ESTROGEN RECEPTOR NEGATIVE: Primary | ICD-10-CM

## 2020-01-20 DIAGNOSIS — M25.611 DECREASED SHOULDER MOBILITY, RIGHT: ICD-10-CM

## 2020-01-20 DIAGNOSIS — Z17.1: ICD-10-CM

## 2020-01-20 DIAGNOSIS — Z91.89 AT RISK FOR LYMPHEDEMA: ICD-10-CM

## 2020-01-20 DIAGNOSIS — C50.021 MALIGNANT NEOPLASM INVOLVING BOTH NIPPLE AND AREOLA OF RIGHT BREAST IN MALE, ESTROGEN RECEPTOR NEGATIVE: ICD-10-CM

## 2020-01-20 DIAGNOSIS — Z17.1 MALIGNANT NEOPLASM INVOLVING BOTH NIPPLE AND AREOLA OF RIGHT BREAST IN MALE, ESTROGEN RECEPTOR NEGATIVE: ICD-10-CM

## 2020-01-20 DIAGNOSIS — Z17.1 MALIGNANT NEOPLASM INVOLVING BOTH NIPPLE AND AREOLA OF RIGHT BREAST IN MALE, ESTROGEN RECEPTOR NEGATIVE: Primary | ICD-10-CM

## 2020-01-20 DIAGNOSIS — C50.021: ICD-10-CM

## 2020-01-20 PROCEDURE — 99213 OFFICE O/P EST LOW 20 MIN: CPT | Mod: PBBFAC | Performed by: RADIOLOGY

## 2020-01-20 PROCEDURE — 99204 OFFICE O/P NEW MOD 45 MIN: CPT | Mod: S$PBB,,, | Performed by: RADIOLOGY

## 2020-01-20 PROCEDURE — 99999 PR PBB SHADOW E&M-EST. PATIENT-LVL III: ICD-10-PCS | Mod: PBBFAC,,, | Performed by: RADIOLOGY

## 2020-01-20 PROCEDURE — 99204 PR OFFICE/OUTPT VISIT, NEW, LEVL IV, 45-59 MIN: ICD-10-PCS | Mod: S$PBB,,, | Performed by: RADIOLOGY

## 2020-01-20 PROCEDURE — 97110 THERAPEUTIC EXERCISES: CPT | Performed by: PHYSICAL MEDICINE & REHABILITATION

## 2020-01-20 PROCEDURE — 97140 MANUAL THERAPY 1/> REGIONS: CPT | Performed by: PHYSICAL MEDICINE & REHABILITATION

## 2020-01-20 PROCEDURE — 99999 PR PBB SHADOW E&M-EST. PATIENT-LVL III: CPT | Mod: PBBFAC,,, | Performed by: RADIOLOGY

## 2020-01-20 NOTE — PROGRESS NOTES
Physical Therapy Daily Treatment Note & Discharge Note     Name: Paul Li Jr.  Clinic Number: [id number]  Diagnosis:   Encounter Diagnoses   Name Primary?    Decreased shoulder mobility, right     At risk for lymphedema     Malignant neoplasm of nipple of right breast in male, estrogen receptor negative     Malignant neoplasm involving both nipple and areola of right breast in male, estrogen receptor negative      Physician: Dr. Shima Whyte    Precaution: None  Visit #: 3   Time In: 4:00  PM  Time Out: 4:45  PM   Total Treatment Time 1:1: 45 minutes    Evaluation Date: 1/9/202  Visit # authorized:   Authorization period: 12/31/2020  Plan of care Expiration: 2/21/2020  MD referral: Dr. Whyte  Insurance: Self Pay      Subjective     Pt reports: moving right arm better and states having minimal tightness right forearm.  States having tightness feels fluid area of mastectomy scar.States has begun exercise program--stationary bike and treadmill.  Pain Scale: Paul rates pain on a scale of 0/10 on VAS.   Pain location: NA    Fatigue: None  Functional change: able to move RUE without difficulty  Diagnosis: Right breast IDC, Stage 1b. Grade 2, ER (-), OH (-), HER2 (-)  Chief complaint: tightness and pain in right arm and difficulty raising right arm upward  Surgical History: 12/17/19 right axillary lymph node dissection and resection excess lateral tissue; 11/22/19 right simple mastectomy with SLNB; total of 18 lymph nodes removed  Paul Li Jr.  has a past surgical history that includes Insertion of tunneled central venous catheter (CVC) with subcutaneous port (Left, 5/24/2019); Simple mastectomy (Right, 11/22/2019); [place] lymph node biopsy (Right, 11/22/2019); Axillary node dissection (Right, 11/22/2019); and Axillary node dissection (Right, 12/17/2019).     Chemotherapy: te-adjuvant chemo therapy completed 10/19  Radiation: Simulation on 1/24/2020       Objective  "    Tess Heredia RN --Dr. Whyte's nurse-looked at patient's right anterior chest area. Concern for possible seroma.     Paul received individual therapeutic exercises to improve postural correction and alignment, stretching and soft tissue mobility, and strengthening for 30 minutes including the following:   Supine Shld Flexion with wand    1 x 10  LTR with ER                               1 x10  Seated Scap retractions              10 x 5"  Elbow Flexion/extension             1 x 10  Fist making                                 1 x 10  Wall Climbs (B)   Forward       10 x 5"                               Sideways      10 x 5"  ER on Wall                                10 x 5"    Paul received the following manual therapy techniques were performed to increased myofascial/soft tissue length, mobility and pliability, increase PROM, AROM and function as well as to decrease pain for 15 minutes  Bending/streching right axillary, upper arm and forearm cording  Anterior and Lateral chest stretch/STM right chest mastectomy scar  Pectoralis bending stretch with ER  Passive stretch right Shoulder     Patient fitted with Size F tubigrip to RUE, to wear  to help decrease RUE edema     Shoulder Range of Motion:   Active /Passive ROM Right Left   Flexion 170 170   Abduction 170 170   Extension 70 70   IR/90deg 80 80   ER/90deg 90 90     LANDMARK RUE Diff RUE Diff RIGHT UE LEFT UE DIFFERENCE   Date 1/20/20 1/20/20 1/16/20 1/16/20 1/9/20 1/9/20 1/9/20   E + 8" 54.5 cm 0.5 cm dec 55 cm No Chg 55 cm 53 cm 2 cm   E + 6" 50 cm 3 cm inc 47 cm 3 cm dec 51 cm 48 cm 3 cm   E + 4" 46.5 cm 0.5 cm dec 47 cm 1 cm dec 48 cm 44 cm 4 cm   E + 2" 42.5 cm 0.5 cm dec 43 cm 2 cm dec 45 cm 40 cm 5 cm   Elbow 39.5 cm No chg 39 cm 0.5 cm dec 39.5 cm 37.5 cm 2 cm   W+ 8" 40 cm 1 cm inc 39 cm 1 cm dec 40 cm 37 cm 3 cm   W +  6" 36.5 cm 1.5 cm inc 35 cm 1 cm dec 36 cm 32 cm 4 cm   W + 4" 31 cm 1 cm inc 30 cm No chg 30 cm 27 cm 3 cm   Wrist 22 cm No chg " 22 cm  No chg 22 cm 21 cm 1 cm   DPC 29.5 cm No chg 29.5 cm 0.5 cm dec 30 cm 28 cm 2 cm   IP Thumb 8 cm No chg 8 cm  0.f cm dec 8.5 cm 8.0. cm 0.5 cm     Functional Limitations Reporting       CMS Impairment/Limitation/Restriction for FOTO outcome Survey     Therapist reviewed FOTO scores for Paul Li Jr. on 1/20/2020.   FOTO documents entered into [employer] - see Media section.     Limitations Score: 32%  Category: Carrying         Home Exercise Program and Patient Education   Education provided re:  - role of PT in multi - disciplinary team, goals for PT  - progress towards goals     See EMR under notes/patient instructions for HEP given/taught this session - all sets and reps included. Pt received printed copy.     Pt was able to demonstrate and report understanding and performance  Pt has no cultural, educational or language barriers to learning provided.    Assessment     Patient has responded well to physical therapy. Is at expected function and pain level for 5 weeks post operatively.   Pain after treatment: 0/10    Pt prognosis is Good.Patient received manual therapy as above to decrease cording and soft tissue tightness. Cording in right axilla has decreased and verymild cording remains in right forearm.cording, however, is not restricting AROM of right shoulder and arm. Patient performed above exercise program and instructed to continue with HEP through radiation treatment to maintain ROM. AROM now WNL for right shoulder. Girth of RUE fluctuating with edema remaining in right hand and forearm. Patient instructed to obtain an isotoner glove for right hand and to continue to wear tubigrip sleeve to right arm to help to decrease edema and while receiving radiation treatment. Patient instructed to return to therapy post-radiation, if edema remains in RUE for CDT. Patient met 4/5 STG and 5/6 LTG.    Goals as follows:  Short Term goals: 3 weeks  1. Patient will demonstrate 100% understanding of lymphedema risk  reduction practices to include self monitoring for lymphedema. (met, 1/16/20)  2. Patient will demonstrate independence with Home Exercise program established. (met, 1/16/20)  3. Pt will increase AROM/PROM in shoulder abduction ROM to 140 degrees on right to improve functional reach, carry, push, pull pain free. (met, 1/16/20)  4. Pt will increase AROM/PROM in shoulder flexion to 150 degrees on right to improve functional reach, carry, push, pull pain free.(met, 1/16/20)  5. Decrease girth in R upper arm, forearm, wrist and hand by up to 2cm to allow for improved UE symmetry, clothing choice, ROM, and UE function. (not met, )        Long Term Goals: 6 weeks   1.  Pt will increase AROM/PROM in shoulder flexion to 170 degrees on right to improve functional reach, carry, push, pull pain free. (met, 1/16/20)  2. Patient to show decreased girth in R UE by up to 3cm to allow for improved UE symmetry, clothing choice, ROM, and UE function.  (not met)  3. Pt will demonstrate full/maximized tissue mobility to increase ROM and promote healthy tissue to be pain free at discharge. (met, 1/202/20)  4. Pt will report decrease in overall worst pain to 2/10 at discharge. Met, 1/202/20)  5. Pt will increase AROM/PROM in shoulder abduction ROM to 170 degrees on right to improve functional reach, carry, push, pull pain free. (met, 1/16/20)  6. Patient will report compliance with walking program 5x week for 10 min each day to improve overall cardiovascular function and decrease cancer related fatigue at discharge. (met, 1/20/20)        Plan     Patient is discharged from physical therapy and will start radiation treatment next week.    Therapist: Vannessa Kasper, PT  1/20/2020

## 2020-01-20 NOTE — LETTER
January 20, 2020      Richa Bahena MD  [address]  [facility] [address]           [facility] Radiation On[facility]  [address]  [facility] [address]  Phone: [phone]          Patient: Paul Li Jr.   MR Number: [id number]   YOB: 1975   Date of Visit: 1/20/2020       Dear Dr. Richa Bahena:    Thank you for referring Paul Li to me for evaluation. Attached you will find relevant portions of my assessment and plan of care.    If you have questions, please do not hesitate to call me. I look forward to following Paul Li along with you.    Sincerely,    Speedy Nair Jr., MD    Enclosure  CC:  No Recipients    If you would like to receive this communication electronically, please contact [email or web address] or [phone] to request more information on [employer] access.    For providers and/or their staff who would like to refer a patient to [facility], please contact us through our one-stop-shop provider referral line, [facility], at [phone].    If you feel you have received this communication in error or would no longer like to receive these types of communications, please e-mail [email or web address]

## 2020-01-20 NOTE — PROGRESS NOTES
HISTORY OF PRESENT ILLNESS:   This patient presents for discussion of adjuvant radiotherapy for an infiltrating ductal carcinoma of the Rt. breast.     Mr. Li was initially referred to mammography in May of 2019 for evaluation of a palpable mass in the Rt. breast.  Diagnostic MMG on 5/1/19 confirmed a 14 mm irregularly shaped mass with indistinct margins in the retroareolar region of the Rt. breast.  There was inversion of the Rt. nipple with some skin thickening.  Biopsy on 5/2/19 revealed invasive ductal carcinoma with lobular features, histologic grade 3, nuclear grade 3 and mitotic index 1.  The tumor was ER, IA and Her - 2 negative.  Ki-67 was 80%.  The patient was referred for neoadjuvant chemotherapy.  He subsequently completed 4 cycles of dose dense AC followed by weekly Paclitaxel for 12 weeks.  On 11/22/19 the patient underwent Rt. simple mastectomy with sentinel node biopsy.  Pathology from the Rt breast revealed a 6 mm focus of residual invasive carcinoma, histologic grade 3, nuclear grade 3 and mitotic index 1.  There was extensive chemotherapeutic effect.  There was dermal lymphovascular invasion.  The margins of resection were negative at 3 cm.  There tumor involvement of one of 4 sentinel nodes.  The tumor deposit measured 4 mm.  There was no extranodal extension.  The patient returned to the OR and underwent completion Rt. axillary dissection on 12/17/19.  Fourteen axillary nodes were recovered, all of which were negative for tumor involvement. The patient is now referred for consideration of adjuvant radiotherapy.  Today the patient states he feels well.  Denies chest wall pain or tenderness.  Notes mild swelling of the Rt. hand.  He is being followed by physical therapy.      4 children, all sons.     FAMILY History:     Paternal GM breast cancer, 70s  Maternal GM cancer, GI cancer.  H/o ovarian mass (unsure if cancer)  Maternal GF throat cancer    REVIEW OF SYSTEMS:   Review of Systems    Constitutional: Negative for chills, fever, malaise/fatigue and weight loss.   Respiratory: Negative for cough, sputum production and shortness of breath.    Cardiovascular: Negative for chest pain, palpitations and claudication.   Gastrointestinal: Negative for abdominal pain, nausea and vomiting.   Genitourinary: Negative for dysuria, frequency and urgency.         PAST MEDICAL HISTORY:  Past Medical History:   Diagnosis Date    Arthritis     Cancer     R breast    Diabetes mellitus     HTN (hypertension)     Leg edema, right     Prediabetes     Seizures     at age 18 - stress related    Therapy     marital counseling       PAST SURGICAL HISTORY:  Past Surgical History:   Procedure Laterality Date    AXILLARY NODE DISSECTION Right 11/22/2019    Procedure: LYMPHADENECTOMY, AXILLARY RIGHT;  Surgeon: Shima Whyte MD;  Location: [facility];  Service: General;  Laterality: Right;    AXILLARY NODE DISSECTION Right 12/17/2019    Procedure: LYMPHADENECTOMY, AXILLARY;  Surgeon: Shima Whyte MD;  Location: [facility] OR [address] [place] [address];  Service: General;  Laterality: Right;    INSERTION OF TUNNELED CENTRAL VENOUS CATHETER (CVC) WITH SUBCUTANEOUS PORT Left 5/24/2019    Procedure: INVUUZPOH-UBHT-T-CATH;  Surgeon: Shima Whyte MD;  Location: [facility] OR [address] [place] [address];  Service: General;  Laterality: Left;    SENTINEL LYMPH NODE BIOPSY Right 11/22/2019    Procedure: BIOPSY, LYMPH NODE, SENTINEL RIGHT;  Surgeon: Shima Whyte MD;  Location: [facility];  Service: General;  Laterality: Right;    SIMPLE MASTECTOMY Right 11/22/2019    Procedure: MASTECTOMY, SIMPLE RIGHT (CONSENT AM OF) 2.5 hr case;  Surgeon: Shima Whyte MD;  Location: [facility];  Service: General;  Laterality: Right;       ALLERGIES:   Review of patient's allergies indicates:  No Known Allergies    MEDICATIONS:  Current Outpatient Medications   Medication Sig    amLODIPine (NORVASC) 10 MG tablet TAKE 1 TABLET (10 MG) BY MOUTH EVERY DAY (Patient taking differently: Take 10 mg  by mouth every morning. )    atorvastatin (LIPITOR) 20 MG tablet Take 1 tablet (20 mg total) by mouth once daily.    blood sugar diagnostic Strp To check BG 4 times daily, to use with insurance preferred meter    blood-glucose meter kit Use as instructed    dulaglutide (TRULICITY) 0.75 mg/0.5 mL PnIj Inject 0.5 mLs (0.75 mg total) into the skin every 7 days. Trulicity 0.75mg weekly for 4 weeks & then increase to 1.5 mg weekly indefinitely.    dulaglutide (TRULICITY) 1.5 mg/0.5 mL PnIj Inject 1.5 mg into the skin once a week. Trulicity 0.75mg weekly for 4 weeks & then increase to 1.5 mg weekly indefinitely.    escitalopram oxalate (LEXAPRO) 10 MG tablet Take 1 tablet (10 mg total) by mouth once daily.    flash glucose scanning reader (FREESTYLE MARIANO 14 DAY READER) Misc Check glucose continuousl;y    flash glucose sensor (FREESTYLE MARIANO 14 DAY SENSOR) Kit Check glucose continously    gabapentin (NEURONTIN) 300 MG capsule Take 1 capsule (300 mg total) by mouth 3 (three) times daily.    hydroCHLOROthiazide (HYDRODIURIL) 25 MG tablet TAKE 1 TABLET BY MOUTH ONCE DAILY    insulin aspart U-100 (NOVOLOG) 100 unit/mL (3 mL) InPn pen Inject 24 Units into the skin 3 (three) times daily. (Patient taking differently: Inject 16 Units into the skin 3 (three) times daily. )    insulin detemir U-100 (LEVEMIR FLEXTOUCH) 100 unit/mL (3 mL) SubQ InPn pen Inject 40 Units into the skin 2 (two) times daily. (Patient taking differently: Inject 36 Units into the skin nightly. )    lancets 33 gauge Misc Use 2 (two) times daily with meals.    lancets 33 gauge Misc To check BG 4 times daily, to use with insurance preferred meter    lancing device Misc 1 Device by Misc.(Non-Drug; Combo Route) route 2 (two) times daily with meals.    lidocaine-prilocaine (EMLA) cream Apply topically to affected area 45 minutes before port access    losartan (COZAAR) 100 MG tablet TAKE 1 TABLET BY MOUTH ONCE DAILY    metFORMIN (GLUCOPHAGE-XR)  "500 MG 24 hr tablet Take 2 tablets (1,000 mg total) by mouth daily with breakfast. (Patient taking differently: Take 1,000 mg by mouth 2 (two) times daily with meals. )    ondansetron (ZOFRAN-ODT) 8 MG TbDL Take 1 tablet (8 mg total) by mouth every 12 (twelve) hours as needed.    oxyCODONE-acetaminophen (PERCOCET) 5-325 mg per tablet Take 1 tablet by mouth every 4 (four) hours as needed for Pain.    oxyCODONE-acetaminophen (PERCOCET) 5-325 mg per tablet Take 1 tablet by mouth every 6 (six) hours as needed.    pen needle, diabetic (BD ULTRA-FINE TANESHA PEN NEEDLE) 32 gauge x 5/32" Ndle Use as directed with novolog and levemir    senna (SENOKOT) 8.6 mg tablet Take 1 tablet by mouth once daily.    tadalafil (CIALIS) 5 MG tablet Take 2 tablets (10 mg total) by mouth daily as needed for Erectile Dysfunction.     No current facility-administered medications for this visit.        SOCIAL HISTORY:  Social History     Socioeconomic History    Marital status: Single     Spouse name: Not on file    Number of children: 3    Years of education: Not on file    Highest education level: Not on file   Occupational History    Occupation:    Social Needs    Financial resource strain: Not very hard    Food insecurity:     Worry: Never true     Inability: Never true    Transportation needs:     Medical: Yes     Non-medical: No   Tobacco Use    Smoking status: Former Smoker     Packs/day: 0.25     Years: 15.00     Pack years: 3.75     Types: Cigarettes     Last attempt to quit: 2014     Years since quittin.1    Smokeless tobacco: Never Used    Tobacco comment: Social smoker with alcohol    Substance and Sexual Activity    Alcohol use: Yes     Alcohol/week: 0.0 standard drinks     Frequency: Monthly or less     Drinks per session: 1 or 2     Binge frequency: Never     Comment: Rarely    Drug use: Never    Sexual activity: Yes     Partners: Female     Birth control/protection: None   Lifestyle    " Physical activity:     Days per week: 0 days     Minutes per session: Not on file    Stress: Only a little   Relationships    Social connections:     Talks on phone: More than three times a week     Gets together: Patient refused     Attends Church service: Not on file     Active member of club or organization: No     Attends meetings of clubs or organizations: Never     Relationship status:    Other Topics Concern    Patient feels they ought to cut down on drinking/drug use Not Asked    Patient annoyed by others criticizing their drinking/drug use Not Asked    Patient has felt bad or guilty about drinking/drug use Not Asked    Patient has had a drink/used drugs as an eye opener in the AM Not Asked   Social History Narrative    From [place]    Lives with wife    3 sons in college    Wife recent loss of 6 month pregnancy (May 1, 2019)       FAMILY HISTORY:  Family History   Problem Relation Age of Onset    Hypertension Mother     Diabetes Mother     Hypertension Father     Lupus Father     Post-traumatic stress disorder Brother     No Known Problems Maternal Grandmother     Colon cancer Maternal Grandfather     Breast cancer Paternal Grandmother     No Known Problems Paternal Grandfather     No Known Problems Sister     No Known Problems Maternal Aunt     No Known Problems Maternal Uncle     Fibromyalgia Paternal Aunt     Lupus Paternal Aunt     No Known Problems Paternal Uncle     No Known Problems Brother     No Known Problems Sister     No Known Problems Son     No Known Problems Son     No Known Problems Son     No Known Problems Son          PHYSICAL EXAMINATION:  There were no vitals filed for this visit.  Physical Exam   Constitutional: He is well-developed, well-nourished, and in no distress.   Eyes: Pupils are equal, round, and reactive to light. EOM are normal.   Neck: Normal range of motion. Neck supple.   Pulmonary/Chest:       Musculoskeletal:        Right  forearm: He exhibits no swelling and no edema.   Lymphadenopathy:     He has no cervical adenopathy.     He has no axillary adenopathy.        Right: No supraclavicular adenopathy present.        Left: No supraclavicular adenopathy present.       ASSESSMENT/PLAN:  History of clinical stage IB (T1c, N0, M0, G2, ER-, [place]- and Her-2 -) yp T1b, N1 breast cancer.      ECO    I had a long discussion with the patient.  We discussed the rational for postoperative radiotherapy in this setting.  Discussed the procedures, risks and benefits of therapy. Discussed the acute and long term side effects including increased edema of the Rt. hand/ arm.  The patient was agreeable to proceed with therapy.  Will plan simulation.  Anticipate a course of radiotherapy delivering 50.4 Gy to the chest wall and regional nodes.      I spent approximately 60 minutes reviewing the available records and evaluating the patient, out of which over 50% of the time was spent face to face with the patient in counseling and coordinating this patient's care.

## 2020-01-23 ENCOUNTER — HOSPITAL ENCOUNTER (OUTPATIENT)
Dept: RADIATION THERAPY | Facility: HOSPITAL | Age: 45
Discharge: HOME | End: 2020-01-23
Attending: RADIOLOGY

## 2020-01-23 PROCEDURE — 77263 PR  RADIATION THERAPY PLAN COMPLEX: ICD-10-PCS | Mod: ,,, | Performed by: RADIOLOGY

## 2020-01-23 PROCEDURE — 77290 PR  SET RADN THERAPY FIELD COMPLEX: ICD-10-PCS | Mod: 26,,, | Performed by: RADIOLOGY

## 2020-01-23 PROCEDURE — 77014 HC CT GUIDANCE RADIATION THERAPY FLDS PLACEMENT: CPT | Mod: TC | Performed by: RADIOLOGY

## 2020-01-23 PROCEDURE — 77333 RADIATION TREATMENT AID(S): CPT | Mod: 26,,, | Performed by: RADIOLOGY

## 2020-01-23 PROCEDURE — 77290 THER RAD SIMULAJ FIELD CPLX: CPT | Mod: 26,,, | Performed by: RADIOLOGY

## 2020-01-23 PROCEDURE — 77333 PR  RADN TREATMENT AID(S) INTERM: ICD-10-PCS | Mod: 26,,, | Performed by: RADIOLOGY

## 2020-01-23 PROCEDURE — 77263 THER RADIOLOGY TX PLNG CPLX: CPT | Mod: ,,, | Performed by: RADIOLOGY

## 2020-01-23 PROCEDURE — 77333 RADIATION TREATMENT AID(S): CPT | Mod: TC | Performed by: RADIOLOGY

## 2020-01-23 PROCEDURE — 77290 THER RAD SIMULAJ FIELD CPLX: CPT | Mod: TC | Performed by: RADIOLOGY

## 2020-01-29 ENCOUNTER — PATIENT MESSAGE (OUTPATIENT)
Dept: ENDOCRINOLOGY | Facility: CLINIC | Age: 45
End: 2020-01-29

## 2020-01-29 ENCOUNTER — PATIENT MESSAGE (OUTPATIENT)
Dept: RADIATION ONCOLOGY | Facility: CLINIC | Age: 45
End: 2020-01-29

## 2020-01-29 DIAGNOSIS — E11.65 UNCONTROLLED TYPE 2 DIABETES MELLITUS WITH HYPERGLYCEMIA: Primary | ICD-10-CM

## 2020-01-29 PROCEDURE — 77301 RADIOTHERAPY DOSE PLAN IMRT: CPT | Mod: TC | Performed by: RADIOLOGY

## 2020-01-29 PROCEDURE — 77301 RADIOTHERAPY DOSE PLAN IMRT: CPT | Mod: 26,,, | Performed by: RADIOLOGY

## 2020-01-29 PROCEDURE — 77301 PR  INTEN MOD RADIOTHER PLAN W/DOSE VOL HIST: ICD-10-PCS | Mod: 26,,, | Performed by: RADIOLOGY

## 2020-01-30 DIAGNOSIS — E11.65 UNCONTROLLED TYPE 2 DIABETES MELLITUS WITH HYPERGLYCEMIA: Primary | ICD-10-CM

## 2020-01-30 PROCEDURE — 77338 DESIGN MLC DEVICE FOR IMRT: CPT | Mod: TC | Performed by: RADIOLOGY

## 2020-01-30 PROCEDURE — 77300 PR RADIATION THERAPY,DOSIMETRY PLAN: ICD-10-PCS | Mod: 26,,, | Performed by: RADIOLOGY

## 2020-01-30 PROCEDURE — 77338 DESIGN MLC DEVICE FOR IMRT: CPT | Mod: 26,,, | Performed by: RADIOLOGY

## 2020-01-30 PROCEDURE — 77300 RADIATION THERAPY DOSE PLAN: CPT | Mod: 26,,, | Performed by: RADIOLOGY

## 2020-01-30 PROCEDURE — 77338 PR  MLC IMRT DESIGN & CONSTRUCTION PER IMRT PLAN: ICD-10-PCS | Mod: 26,,, | Performed by: RADIOLOGY

## 2020-01-30 PROCEDURE — 77300 RADIATION THERAPY DOSE PLAN: CPT | Mod: TC | Performed by: RADIOLOGY

## 2020-01-31 PROCEDURE — 77417 THER RADIOLOGY PORT IMAGE(S): CPT | Performed by: RADIOLOGY

## 2020-01-31 PROCEDURE — 77387 GUIDANCE FOR RADJ TX DLVR: CPT | Mod: TC | Performed by: RADIOLOGY

## 2020-01-31 PROCEDURE — 77387 GUIDANCE FOR RADJ TX DLVR: CPT | Mod: 26,,, | Performed by: RADIOLOGY

## 2020-01-31 PROCEDURE — 77387 PR GUIDANCE FOR RADIATION TREATMENT DELIVERY: ICD-10-PCS | Mod: 26,,, | Performed by: RADIOLOGY

## 2020-01-31 PROCEDURE — 77280 THER RAD SIMULAJ FIELD SMPL: CPT | Mod: 26,,, | Performed by: RADIOLOGY

## 2020-01-31 PROCEDURE — 77280 PR  SET RADN THERAPY FIELD SIMPLE: ICD-10-PCS | Mod: 26,,, | Performed by: RADIOLOGY

## 2020-01-31 PROCEDURE — 77280 THER RAD SIMULAJ FIELD SMPL: CPT | Mod: TC | Performed by: RADIOLOGY

## 2020-02-03 ENCOUNTER — HOSPITAL ENCOUNTER (OUTPATIENT)
Dept: RADIATION THERAPY | Facility: HOSPITAL | Age: 45
Discharge: HOME | End: 2020-02-03
Attending: RADIOLOGY

## 2020-02-03 PROCEDURE — 77387 PR GUIDANCE FOR RADIATION TREATMENT DELIVERY: ICD-10-PCS | Mod: ,,, | Performed by: RADIOLOGY

## 2020-02-03 PROCEDURE — 77387 GUIDANCE FOR RADJ TX DLVR: CPT | Mod: ,,, | Performed by: RADIOLOGY

## 2020-02-03 PROCEDURE — 77385 HC IMRT, SIMPLE: CPT | Performed by: RADIOLOGY

## 2020-02-04 PROCEDURE — 77387 GUIDANCE FOR RADJ TX DLVR: CPT | Mod: ,,, | Performed by: RADIOLOGY

## 2020-02-04 PROCEDURE — 77385 HC IMRT, SIMPLE: CPT | Performed by: RADIOLOGY

## 2020-02-04 PROCEDURE — 77387 PR GUIDANCE FOR RADIATION TREATMENT DELIVERY: ICD-10-PCS | Mod: ,,, | Performed by: RADIOLOGY

## 2020-02-05 PROCEDURE — 77387 GUIDANCE FOR RADJ TX DLVR: CPT | Mod: ,,, | Performed by: RADIOLOGY

## 2020-02-05 PROCEDURE — 77385 HC IMRT, SIMPLE: CPT | Performed by: RADIOLOGY

## 2020-02-05 PROCEDURE — 77387 PR GUIDANCE FOR RADIATION TREATMENT DELIVERY: ICD-10-PCS | Mod: ,,, | Performed by: RADIOLOGY

## 2020-02-06 ENCOUNTER — DOCUMENTATION ONLY (OUTPATIENT)
Dept: RADIATION ONCOLOGY | Facility: CLINIC | Age: 45
End: 2020-02-06

## 2020-02-06 PROCEDURE — 77387 GUIDANCE FOR RADJ TX DLVR: CPT | Mod: ,,, | Performed by: RADIOLOGY

## 2020-02-06 PROCEDURE — 77385 HC IMRT, SIMPLE: CPT | Performed by: RADIOLOGY

## 2020-02-06 PROCEDURE — 77387 PR GUIDANCE FOR RADIATION TREATMENT DELIVERY: ICD-10-PCS | Mod: ,,, | Performed by: RADIOLOGY

## 2020-02-07 ENCOUNTER — TELEPHONE (OUTPATIENT)
Dept: PHARMACY | Facility: CLINIC | Age: 45
End: 2020-02-07

## 2020-02-07 PROCEDURE — 77387 PR GUIDANCE FOR RADIATION TREATMENT DELIVERY: ICD-10-PCS | Mod: ,,, | Performed by: RADIOLOGY

## 2020-02-07 PROCEDURE — 77385 HC IMRT, SIMPLE: CPT | Performed by: RADIOLOGY

## 2020-02-07 PROCEDURE — 77336 RADIATION PHYSICS CONSULT: CPT | Performed by: RADIOLOGY

## 2020-02-07 PROCEDURE — 77387 GUIDANCE FOR RADJ TX DLVR: CPT | Mod: ,,, | Performed by: RADIOLOGY

## 2020-02-07 NOTE — TELEPHONE ENCOUNTER
Patient approved with [facility] (Jardiance) until 02/06/2021. Medication will be shipped to patients home within 7-10 business days.

## 2020-02-10 PROCEDURE — 77385 HC IMRT, SIMPLE: CPT | Performed by: RADIOLOGY

## 2020-02-10 PROCEDURE — 77387 PR GUIDANCE FOR RADIATION TREATMENT DELIVERY: ICD-10-PCS | Mod: ,,, | Performed by: RADIOLOGY

## 2020-02-10 PROCEDURE — 77417 THER RADIOLOGY PORT IMAGE(S): CPT | Performed by: RADIOLOGY

## 2020-02-10 PROCEDURE — 77387 GUIDANCE FOR RADJ TX DLVR: CPT | Mod: ,,, | Performed by: RADIOLOGY

## 2020-02-12 PROCEDURE — 77387 PR GUIDANCE FOR RADIATION TREATMENT DELIVERY: ICD-10-PCS | Mod: ,,, | Performed by: RADIOLOGY

## 2020-02-12 PROCEDURE — 77385 HC IMRT, SIMPLE: CPT | Performed by: RADIOLOGY

## 2020-02-12 PROCEDURE — 77387 GUIDANCE FOR RADJ TX DLVR: CPT | Mod: ,,, | Performed by: RADIOLOGY

## 2020-02-13 ENCOUNTER — DOCUMENTATION ONLY (OUTPATIENT)
Dept: RADIATION ONCOLOGY | Facility: CLINIC | Age: 45
End: 2020-02-13

## 2020-02-13 PROCEDURE — 77385 HC IMRT, SIMPLE: CPT | Performed by: RADIOLOGY

## 2020-02-13 PROCEDURE — 77387 GUIDANCE FOR RADJ TX DLVR: CPT | Mod: ,,, | Performed by: RADIOLOGY

## 2020-02-13 PROCEDURE — 77387 PR GUIDANCE FOR RADIATION TREATMENT DELIVERY: ICD-10-PCS | Mod: ,,, | Performed by: RADIOLOGY

## 2020-02-13 NOTE — PLAN OF CARE
Pt. On day 8 of outpt. Xrt to right chestwall.  Doing well.  No skin issues.  Using miaderm BID.  Wgt 409.2 lb

## 2020-02-14 ENCOUNTER — OFFICE VISIT (OUTPATIENT)
Dept: HEMATOLOGY/ONCOLOGY | Facility: CLINIC | Age: 45
End: 2020-02-14

## 2020-02-14 ENCOUNTER — LAB VISIT (OUTPATIENT)
Dept: LAB | Facility: HOSPITAL | Age: 45
End: 2020-02-14
Attending: INTERNAL MEDICINE

## 2020-02-14 VITALS
SYSTOLIC BLOOD PRESSURE: 131 MMHG | HEIGHT: 74 IN | RESPIRATION RATE: 18 BRPM | HEART RATE: 68 BPM | OXYGEN SATURATION: 96 % | BODY MASS INDEX: 40.43 KG/M2 | DIASTOLIC BLOOD PRESSURE: 61 MMHG | WEIGHT: 315 LBS | TEMPERATURE: 98 F

## 2020-02-14 DIAGNOSIS — D50.9 MICROCYTIC ANEMIA: ICD-10-CM

## 2020-02-14 DIAGNOSIS — I10 ESSENTIAL HYPERTENSION: ICD-10-CM

## 2020-02-14 DIAGNOSIS — C50.021 MALIGNANT NEOPLASM INVOLVING BOTH NIPPLE AND AREOLA OF RIGHT BREAST IN MALE, ESTROGEN RECEPTOR NEGATIVE: ICD-10-CM

## 2020-02-14 DIAGNOSIS — T45.1X5A CHEMOTHERAPY-INDUCED NEUROPATHY: ICD-10-CM

## 2020-02-14 DIAGNOSIS — G62.0 CHEMOTHERAPY-INDUCED NEUROPATHY: ICD-10-CM

## 2020-02-14 DIAGNOSIS — C50.021 MALIGNANT NEOPLASM INVOLVING BOTH NIPPLE AND AREOLA OF RIGHT BREAST IN MALE, ESTROGEN RECEPTOR NEGATIVE: Primary | ICD-10-CM

## 2020-02-14 DIAGNOSIS — F43.23 ADJUSTMENT DISORDER WITH MIXED ANXIETY AND DEPRESSED MOOD: ICD-10-CM

## 2020-02-14 DIAGNOSIS — Z17.1 MALIGNANT NEOPLASM INVOLVING BOTH NIPPLE AND AREOLA OF RIGHT BREAST IN MALE, ESTROGEN RECEPTOR NEGATIVE: ICD-10-CM

## 2020-02-14 DIAGNOSIS — Z17.1 MALIGNANT NEOPLASM INVOLVING BOTH NIPPLE AND AREOLA OF RIGHT BREAST IN MALE, ESTROGEN RECEPTOR NEGATIVE: Primary | ICD-10-CM

## 2020-02-14 DIAGNOSIS — I89.0 LYMPHEDEMA OF RIGHT ARM: ICD-10-CM

## 2020-02-14 LAB
ALBUMIN SERPL BCP-MCNC: 3.9 G/DL (ref 3.5–5.2)
ALP SERPL-CCNC: 89 U/L (ref 55–135)
ALT SERPL W/O P-5'-P-CCNC: 17 U/L (ref 10–44)
ANION GAP SERPL CALC-SCNC: 7 MMOL/L (ref 8–16)
AST SERPL-CCNC: 14 U/L (ref 10–40)
BILIRUB SERPL-MCNC: 0.6 MG/DL (ref 0.1–1)
BUN SERPL-MCNC: 9 MG/DL (ref 6–20)
CALCIUM SERPL-MCNC: 9.1 MG/DL (ref 8.7–10.5)
CHLORIDE SERPL-SCNC: 104 MMOL/L (ref 95–110)
CO2 SERPL-SCNC: 26 MMOL/L (ref 23–29)
CREAT SERPL-MCNC: 1.2 MG/DL (ref 0.5–1.4)
ERYTHROCYTE [DISTWIDTH] IN BLOOD BY AUTOMATED COUNT: 16.2 % (ref 11.5–14.5)
EST. GFR  (AFRICAN AMERICAN): >60 ML/MIN/1.73 M^2
EST. GFR  (NON AFRICAN AMERICAN): >60 ML/MIN/1.73 M^2
GLUCOSE SERPL-MCNC: 94 MG/DL (ref 70–110)
HCT VFR BLD AUTO: 38.8 % (ref 40–54)
HGB BLD-MCNC: 11.7 G/DL (ref 14–18)
IMM GRANULOCYTES # BLD AUTO: 0.01 K/UL (ref 0–0.04)
MCH RBC QN AUTO: 23.2 PG (ref 27–31)
MCHC RBC AUTO-ENTMCNC: 30.2 G/DL (ref 32–36)
MCV RBC AUTO: 77 FL (ref 82–98)
NEUTROPHILS # BLD AUTO: 1.7 K/UL (ref 1.8–7.7)
PLATELET # BLD AUTO: 188 K/UL (ref 150–350)
PMV BLD AUTO: 9.8 FL (ref 9.2–12.9)
POTASSIUM SERPL-SCNC: 4 MMOL/L (ref 3.5–5.1)
PROT SERPL-MCNC: 7.8 G/DL (ref 6–8.4)
RBC # BLD AUTO: 5.05 M/UL (ref 4.6–6.2)
SODIUM SERPL-SCNC: 137 MMOL/L (ref 136–145)
WBC # BLD AUTO: 2.97 K/UL (ref 3.9–12.7)

## 2020-02-14 PROCEDURE — 99214 PR OFFICE/OUTPT VISIT, EST, LEVL IV, 30-39 MIN: ICD-10-PCS | Mod: S$PBB,,, | Performed by: INTERNAL MEDICINE

## 2020-02-14 PROCEDURE — 99214 OFFICE O/P EST MOD 30 MIN: CPT | Mod: S$PBB,,, | Performed by: INTERNAL MEDICINE

## 2020-02-14 PROCEDURE — 77387 GUIDANCE FOR RADJ TX DLVR: CPT | Mod: ,,, | Performed by: RADIOLOGY

## 2020-02-14 PROCEDURE — 85027 COMPLETE CBC AUTOMATED: CPT

## 2020-02-14 PROCEDURE — 99999 PR PBB SHADOW E&M-EST. PATIENT-LVL III: CPT | Mod: PBBFAC,,, | Performed by: INTERNAL MEDICINE

## 2020-02-14 PROCEDURE — 80053 COMPREHEN METABOLIC PANEL: CPT

## 2020-02-14 PROCEDURE — 99999 PR PBB SHADOW E&M-EST. PATIENT-LVL III: ICD-10-PCS | Mod: PBBFAC,,, | Performed by: INTERNAL MEDICINE

## 2020-02-14 PROCEDURE — 77387 PR GUIDANCE FOR RADIATION TREATMENT DELIVERY: ICD-10-PCS | Mod: ,,, | Performed by: RADIOLOGY

## 2020-02-14 PROCEDURE — 99213 OFFICE O/P EST LOW 20 MIN: CPT | Mod: PBBFAC,25 | Performed by: INTERNAL MEDICINE

## 2020-02-14 PROCEDURE — 36415 COLL VENOUS BLD VENIPUNCTURE: CPT

## 2020-02-14 PROCEDURE — 77385 HC IMRT, SIMPLE: CPT | Performed by: RADIOLOGY

## 2020-02-17 PROCEDURE — 77336 RADIATION PHYSICS CONSULT: CPT | Performed by: RADIOLOGY

## 2020-02-17 PROCEDURE — 77387 PR GUIDANCE FOR RADIATION TREATMENT DELIVERY: ICD-10-PCS | Mod: ,,, | Performed by: RADIOLOGY

## 2020-02-17 PROCEDURE — 77387 GUIDANCE FOR RADJ TX DLVR: CPT | Mod: ,,, | Performed by: RADIOLOGY

## 2020-02-17 PROCEDURE — 77385 HC IMRT, SIMPLE: CPT | Performed by: RADIOLOGY

## 2020-02-17 NOTE — PROGRESS NOTES
[facility]  Physical Therapy Re- Evaluation    Name: Paul Li Jr.  Clinic Number: [id number]    Therapy Diagnosis:   Encounter Diagnoses   Name Primary?    Decreased shoulder mobility, right     Lymphedema of right arm     Malignant neoplasm involving both nipple and areola of right breast in male, estrogen receptor negative      Physician: Shima Whyte MD    Physician Orders: PT Eval and Treat   Medical Diagnosis: right breast cancer/RUE lymphedema  Evaluation Date: 2/18/2020  Authorization Period Expiration: 12/31/2020  Plan of Care Certification Period: 4/3/2020 (6 weeks)  Visit # / Visits authorized: 1/ 5  Insurance: Uninsured    Time In: 9:05 AM  Time Out: 10:05 AM  Total Billable Time: 60 minutes    Precautions: cancer      History   History of Present Illness: Paul is a 44 y.o. male that presents to  [facility] s/p right breast surgery.   Diagnosis: Right breast IDC, Stage 1b. Grade 2, ER (-), CA (-), HER2 (-)  Chief complaint: having swelling in RUE since surgery   Surgical History: 12/17/19 right axillary lymph node dissection and resection excess lateral tissue; 11/22/19 right simple mastectomy with SLNB; total of 18 lymph nodes removed  Paul Li Jr.  has a past surgical history that includes Insertion of tunneled central venous catheter (CVC) with subcutaneous port (Left, 5/24/2019); Simple mastectomy (Right, 11/22/2019); [place] lymph node biopsy (Right, 11/22/2019); Axillary node dissection (Right, 11/22/2019); and Axillary node dissection (Right, 12/17/2019).    Chemotherapy: te-adjuvant chemo therapy completed 10/19  Radiation: began 2/3/2020--has ~3 weeks left    Past Medical History:   Diagnosis Date    Arthritis     Cancer     R breast    Diabetes mellitus     HTN (hypertension)     Leg edema, right     Prediabetes     Seizures     at age 18 - stress related    Therapy     marital counseling           Medications:  Paul has a current medication list which includes the following prescription(s): amlodipine, atorvastatin, blood sugar diagnostic, blood-glucose meter, dulaglutide, empagliflozin, empagliflozin, escitalopram oxalate, flash glucose scanning reader, flash glucose sensor, gabapentin, hydrochlorothiazide, insulin aspart u-100, insulin detemir u-100, lancets, lancets, lancing device, lidocaine-prilocaine, losartan, metformin, ondansetron, oxycodone-acetaminophen, oxycodone-acetaminophen, pen needle, diabetic, senna, tadalafil, and losartan-hydrochlorothiazide 100-25 mg, and the following Facility-Administered Medications: heparin, porcine (pf) and sodium chloride 0.9%.    Allergies:  Review of patient's allergies indicates:  No Known Allergies     Prior Therapy: seen at [facility] 1/20 for decreased RUE AROM and cording  Social History:  lives with their spouse  Occupation:   Prior Level of Function: Independent  Current Level of Function: Independent with ADL's    Other Past Medical History: See Above    Patient's Goals: decrease swelling in my right arm    Hand dominance: right handed    Subjective   Pt states: not having any pain  Pain: 0/10 on VAS currently.   Best: 0/10  Worst: 0/10   Pain location: RUE   Objective   Mental status :oriented    Postural examination/scapula alignment: Rounded shoulder and Head forward    Skin Integrity:   Scar Location: right anterior chest  Appearance: healed  Signs of infection: No  Drainage:None  Color:NA    Edema: Moderate  Location: RUE    Axillary Web Syndrome/Cording:   Location: Right medial Upper arm   Degree of Cording: Mild (mild, moderate etc...)   Number of cords present: one    Sensation: WNL         Range of Motion:      Shoulder Range of Motion:   Active /Passive ROM Right Left   Flexion 170 170   Abduction 170 170   Extension 70 70   IR/90deg 80 80   ER/90deg 90 90              Strength: manual muscle test grades below   Upper  "Extremity Strength   (R) UE (L) UE   Shoulder flexion: 5/5 5/5   Shoulder Abduction: 5/5 5/5   Shoulder IR 5/5 5/5   Shoulder ER 5/5 5/5   Elbow flexion: 5/5 5/5   Elbow extension: 5/5 5/5   Wrist flexion: 5/5 5/5   Wrist extension: 5/5 5/5    5/5 5/5       Baseline Measurements of BL UE's for early detection of Lymphedema:     LANDMARK RUE Diff RUE Diff RUE Diff RIGHT UE LEFT UE DIFFERENCE   Date 2/18/20 2/18/20 1/20/20 1/20/20 1/16/20 1/16/20 1/9/20 1/9/20 1/9/20   E + 8" 54.5 cm No chg 54.5 cm 0.5 cm dec 55 cm No Chg 55 cm 53 cm 2 cm   E + 6" 50 cm No chg 50 cm 3 cm inc 47 cm 3 cm dec 51 cm 48 cm 3 cm   E + 4" 47 cm 0.5 cm inc 46.5 cm 0.5 cm dec 47 cm 1 cm dec 48 cm 44 cm 4 cm   E + 2" 44 cm 1.5 cm inc 42.5 cm 0.5 cm dec 43 cm 2 cm dec 45 cm 40 cm 5 cm   Elbow 40.5 cm 1 cm inc 39.5 cm No chg 39 cm 0.5 cm dec 39.5 cm 37.5 cm 2 cm   W+ 8" 40.5 cm 0.5 cm inc 40 cm 1 cm inc 39 cm 1 cm dec 40 cm 37 cm 3 cm   W +  6" 37 cm 0.5 cm inc 36.5 cm 1.5 cm inc 35 cm 1 cm dec 36 cm 32 cm 4 cm   W + 4" 32 cm 1 cm inc 31 cm 1 cm inc 30 cm No chg 30 cm 27 cm 3 cm   Wrist 23 cm 1 cm inc 22 cm No chg 22 cm  No chg 22 cm 21 cm 1 cm   DPC 30 cm 0.5 cm inc 29.5 cm No chg 29.5 cm 0.5 cm dec 30 cm 28 cm 2 cm   IP Thumb 8.5 cm 0.5 cm inc 8 cm No chg 8 cm  0.f cm dec 8.5 cm 8.0. cm 0.5 cm            Functional Mobility (Bed mobility, transfers)  Independent    ADL's:  Independent    Gait Assessment:   - AD used: none  - Assistance: independent  - Distance: community distances     Patient Education Provided   - role of therapy in multi - disciplinary team, goals for therapy  - Pt was educated in lymphedema etiology and management plans--given 1/9/20 and reviewed today  - Pt was provided with written risk reductions and precautions for managing lymphedema--given 1/9/20 and reviewed today.     Pt has no cultural, educational or language barriers to learning provided.  Treatment and Instruction of Home Exercise Program    Time In:  Time " Out:     Paul received individual therapeutic Activitiy to decrease RUE edema  15 minutes including the following:   Self MLD techniques;  Short Neck  Clear Healthy Lymph Nodes:  Left Axilla                                                  Right Groin  Open Anastomoses:  Anterior Axillo-Axillary  (AAA)                                    Axillo-Inguinal     (AI)                                    Posterior Axillo-Axillary  (GEREMIAS)  Right Upper Arm (Lateral and Medial)  Right Antecubital Fossa  Right Dorsal Forearm  Right Volar Forearm  Dorsum Right Hand      Paul received the following manual therapy techniques were perfIndependentormed to increased myofascial/soft tissue length, mobility and pliability, increase PROM, AROM and function as well as to decrease pain for 30 minutes    Short Neck  Clear Healthy Lymph Nodes:  Left Axilla                                                  Right Groin  Open Anastomoses:  Anterior Axillo-Axillary  (AAA)                                    Axillo-Inguinal     (AI)                                    Posterior Axillo-Axillary  (GEREMIAS)  Right Upper Arm (Lateral and Medial)  Right Antecubital Fossa  Right Dorsal Forearm  Right Volar Forearm  Dorsum Right Hand    Rework Right UE  Rework Anastomoses  Rework Healthy lymph Nodes    Applied short stretch bandaging to RUE to decrease edema      Written Home Exercises Provided and Patient Education: Handouts given   See EMR under patient instructions for program given  Pt demonstrated good understanding of the education provided. Patient demo good return demo of skill of exercises.    Functional Limitations Reporting      CMS Impairment/Limitation/Restriction for FOTO Outcome  Survey    Therapist reviewed FOTO scores for Paul Clintonshilap Michael on 2/18/2020.   FOTO documents entered into EPIC - see Media section.    Limitations Score: 0%  Category: Carrying             Assessment   This is a 44 y.o. male referred to outpatient physical therapy and  presents with a medical diagnosis of right breast cancer and was seen today post-operatively to establish PT plan of care for impairments following surgery including: Stage 2 lymphedema of right hand and arm with hyperkeratosis of right arm..     Pt instructed to wear short stretch bandages for at least 24 hours and for 48 hours if possible (returns to clinic 2/20/20). Patient instructed to apply tubigrip sleeve (size G) if he removes short stretch bandages. I will obtain a prescription for a compression glove and sleeve.for patient. Pt will continue to benefit from skilled physical therapy to address the impairments stated in chart below, provide patient/family education and to maximize pt's level of independence in home and community environment     Anticipated barriers to physical therapy: None     Pt's spiritual, cultural and educational needs considered and pt agreeable to plan of care and goals as stated below:     Medical necessity is demonstrated by the following IMPAIRMENTS/PROMBLEM LIST:    History  Co-morbidities and personal factors that may impact the plan of care Co-morbidities:   diabetes, history of cancer and HTN    Personal Factors:   no deficits     moderate   Examination  Body Structures and Functions, activity limitations and participation restrictions that may impact the plan of care Body Regions:   upper extremities    Body Systems:    edema    Participation Restrictions:   No Deficits    Activity limitations:   Learning and applying knowledge  no deficits    General Tasks and Commands  no deficits    Communication  no deficits    Mobility  lifting and carrying objects    Self care  no deficits    Domestic Life  no deficits    Interactions/Relationships  no deficits    Life Areas  no deficits    Community and Social Life  no deficits         moderate   Clinical Presentation evolving clinical presentation with changing clinical characteristics moderate   Decision Making/ Complexity Score:  moderate       Goals: Pt agrees with goals set    Short Term goals: 3 weeks  1. Patient will demonstrate 100% understanding of lymphedema risk reduction practices to include self monitoring for lymphedema. (progressing, not met)  2. Patient will demonstrate independence with Home Exercise program established. (progressing, not met)  3. Decrease girth in right upper arm, forearm, wrist and hand by up to 2cm to allow for improved UE symmetry, clothing choice, ROM, and UE function. (progressing, not met)  4. Patient will perform self lymph drainage techniques to enhance lymphatic drainage and mobility.  (progressing, not met)      Long Term Goals: 6 weeks  5. Patient to show decreased girth in right UE by up to 3cm to allow for improved UE symmetry, clothing choice, ROM, and UE function.  (progressing, not met)  8. Patient will show reduction in density to mild or less with improved contour of limb to allow for improved cosmesis, improved lymphatic drainage, and functional mobility.  (progressing, not met)  9. Patient to bruna/doff compression garment with daily compliance to support lymphatic mobility and skin elasticity.  (progressing, not met)      Plan   Outpatient physical therapy 2x week for 6 weeks to include the following:   Manual Therapy, Patient Education, Self Care, Therapeutic Activites and Therapeutic Exercise.    Plan of care Certification Period: 2/18/2020 to 4/3/2020.    Therapist: Vannessa Kasper, PT  2/18/2020

## 2020-02-18 ENCOUNTER — CLINICAL SUPPORT (OUTPATIENT)
Dept: REHABILITATION | Facility: HOSPITAL | Age: 45
End: 2020-02-18

## 2020-02-18 DIAGNOSIS — I89.0 LYMPHEDEMA OF RIGHT UPPER EXTREMITY: ICD-10-CM

## 2020-02-18 DIAGNOSIS — C50.021 MALIGNANT NEOPLASM INVOLVING BOTH NIPPLE AND AREOLA OF RIGHT BREAST IN MALE, ESTROGEN RECEPTOR NEGATIVE: ICD-10-CM

## 2020-02-18 DIAGNOSIS — Z17.1: ICD-10-CM

## 2020-02-18 DIAGNOSIS — I89.0 LYMPHEDEMA OF RIGHT ARM: ICD-10-CM

## 2020-02-18 DIAGNOSIS — C50.021: ICD-10-CM

## 2020-02-18 DIAGNOSIS — Z17.1 MALIGNANT NEOPLASM INVOLVING BOTH NIPPLE AND AREOLA OF RIGHT BREAST IN MALE, ESTROGEN RECEPTOR NEGATIVE: ICD-10-CM

## 2020-02-18 DIAGNOSIS — I89.0 LYMPHEDEMA OF ARM: Primary | ICD-10-CM

## 2020-02-18 DIAGNOSIS — M25.611 DECREASED SHOULDER MOBILITY, RIGHT: ICD-10-CM

## 2020-02-18 PROCEDURE — 77387 GUIDANCE FOR RADJ TX DLVR: CPT | Mod: ,,, | Performed by: RADIOLOGY

## 2020-02-18 PROCEDURE — 77387 PR GUIDANCE FOR RADIATION TREATMENT DELIVERY: ICD-10-PCS | Mod: ,,, | Performed by: RADIOLOGY

## 2020-02-18 PROCEDURE — 77385 HC IMRT, SIMPLE: CPT | Performed by: RADIOLOGY

## 2020-02-18 PROCEDURE — 97140 MANUAL THERAPY 1/> REGIONS: CPT | Performed by: PHYSICAL MEDICINE & REHABILITATION

## 2020-02-18 PROCEDURE — 97164 PT RE-EVAL EST PLAN CARE: CPT | Performed by: PHYSICAL MEDICINE & REHABILITATION

## 2020-02-18 PROCEDURE — 77417 THER RADIOLOGY PORT IMAGE(S): CPT | Performed by: RADIOLOGY

## 2020-02-18 PROCEDURE — 97530 THERAPEUTIC ACTIVITIES: CPT | Performed by: PHYSICAL MEDICINE & REHABILITATION

## 2020-02-18 NOTE — PLAN OF CARE
[facility]  Physical Therapy Re- Evaluation    Name: Paul Li Jr.  Clinic Number: [id number]    Therapy Diagnosis:   Encounter Diagnoses   Name Primary?    Decreased shoulder mobility, right     Lymphedema of right arm     Malignant neoplasm involving both nipple and areola of right breast in male, estrogen receptor negative      Physician: Shima Whyte MD    Physician Orders: PT Eval and Treat   Medical Diagnosis: right breast cancer/RUE lymphedema  Evaluation Date: 2/18/2020  Authorization Period Expiration: 12/31/2020  Plan of Care Certification Period: 4/3/2020 (6 weeks)  Visit # / Visits authorized: 1/ 5  Insurance: Uninsured    Time In: 9:05 AM  Time Out: 10:05 AM  Total Billable Time: 60 minutes    Precautions: cancer      History   History of Present Illness: Paul is a 44 y.o. male that presents to  [facility] at the [facility] s/p right breast surgery.   Diagnosis: Right breast IDC, Stage 1b. Grade 2, ER (-), MS (-), HER2 (-)  Chief complaint: having swelling in RUE since surgery   Surgical History: 12/17/19 right axillary lymph node dissection and resection excess lateral tissue; 11/22/19 right simple mastectomy with SLNB; total of 18 lymph nodes removed  Paul Li Jr.  has a past surgical history that includes Insertion of tunneled central venous catheter (CVC) with subcutaneous port (Left, 5/24/2019); Simple mastectomy (Right, 11/22/2019); [place] lymph node biopsy (Right, 11/22/2019); Axillary node dissection (Right, 11/22/2019); and Axillary node dissection (Right, 12/17/2019).    Chemotherapy: te-adjuvant chemo therapy completed 10/19  Radiation: began 2/3/2020--has ~3 weeks left    Past Medical History:   Diagnosis Date    Arthritis     Cancer     R breast    Diabetes mellitus     HTN (hypertension)     Leg edema, right     Prediabetes     Seizures     at age 18 - stress related    Therapy     marital counseling           Medications:  Paul has a current medication list which includes the following prescription(s): amlodipine, atorvastatin, blood sugar diagnostic, blood-glucose meter, dulaglutide, empagliflozin, empagliflozin, escitalopram oxalate, flash glucose scanning reader, flash glucose sensor, gabapentin, hydrochlorothiazide, insulin aspart u-100, insulin detemir u-100, lancets, lancets, lancing device, lidocaine-prilocaine, losartan, metformin, ondansetron, oxycodone-acetaminophen, oxycodone-acetaminophen, pen needle, diabetic, senna, tadalafil, and losartan-hydrochlorothiazide 100-25 mg, and the following Facility-Administered Medications: heparin, porcine (pf) and sodium chloride 0.9%.    Allergies:  Review of patient's allergies indicates:  No Known Allergies     Prior Therapy: seen at [facility]/20 for decreased RUE AROM and cording  Social History:  lives with their spouse  Occupation:   Prior Level of Function: Independent  Current Level of Function: Independent with ADL's    Other Past Medical History: See Above    Patient's Goals: decrease swelling in my right arm    Hand dominance: right handed    Subjective   Pt states: not having any pain  Pain: 0/10 on VAS currently.   Best: 0/10  Worst: 0/10   Pain location: RUE   Objective   Mental status :oriented    Postural examination/scapula alignment: Rounded shoulder and Head forward    Skin Integrity:   Scar Location: right anterior chest  Appearance: healed  Signs of infection: No  Drainage:None  Color:NA    Edema: Moderate  Location: RUE    Axillary Web Syndrome/Cording:   Location: Right medial Upper arm   Degree of Cording: Mild (mild, moderate etc...)   Number of cords present: one    Sensation: WNL         Range of Motion:      Shoulder Range of Motion:   Active /Passive ROM Right Left   Flexion 170 170   Abduction 170 170   Extension 70 70   IR/90deg 80 80   ER/90deg 90 90              Strength: manual muscle test grades below   Upper  "Extremity Strength   (R) UE (L) UE   Shoulder flexion: 5/5 5/5   Shoulder Abduction: 5/5 5/5   Shoulder IR 5/5 5/5   Shoulder ER 5/5 5/5   Elbow flexion: 5/5 5/5   Elbow extension: 5/5 5/5   Wrist flexion: 5/5 5/5   Wrist extension: 5/5 5/5    5/5 5/5       Baseline Measurements of BL UE's for early detection of Lymphedema:     LANDMARK RUE Diff RUE Diff RUE Diff RIGHT UE LEFT UE DIFFERENCE   Date 2/18/20 2/18/20 1/20/20 1/20/20 1/16/20 1/16/20 1/9/20 1/9/20 1/9/20   E + 8" 54.5 cm No chg 54.5 cm 0.5 cm dec 55 cm No Chg 55 cm 53 cm 2 cm   E + 6" 50 cm No chg 50 cm 3 cm inc 47 cm 3 cm dec 51 cm 48 cm 3 cm   E + 4" 47 cm 0.5 cm inc 46.5 cm 0.5 cm dec 47 cm 1 cm dec 48 cm 44 cm 4 cm   E + 2" 44 cm 1.5 cm inc 42.5 cm 0.5 cm dec 43 cm 2 cm dec 45 cm 40 cm 5 cm   Elbow 40.5 cm 1 cm inc 39.5 cm No chg 39 cm 0.5 cm dec 39.5 cm 37.5 cm 2 cm   W+ 8" 40.5 cm 0.5 cm inc 40 cm 1 cm inc 39 cm 1 cm dec 40 cm 37 cm 3 cm   W +  6" 37 cm 0.5 cm inc 36.5 cm 1.5 cm inc 35 cm 1 cm dec 36 cm 32 cm 4 cm   W + 4" 32 cm 1 cm inc 31 cm 1 cm inc 30 cm No chg 30 cm 27 cm 3 cm   Wrist 23 cm 1 cm inc 22 cm No chg 22 cm  No chg 22 cm 21 cm 1 cm   DPC 30 cm 0.5 cm inc 29.5 cm No chg 29.5 cm 0.5 cm dec 30 cm 28 cm 2 cm   IP Thumb 8.5 cm 0.5 cm inc 8 cm No chg 8 cm  0.f cm dec 8.5 cm 8.0. cm 0.5 cm            Functional Mobility (Bed mobility, transfers)  Independent    ADL's:  Independent    Gait Assessment:   - AD used: none  - Assistance: independent  - Distance: community distances     Patient Education Provided   - role of therapy in multi - disciplinary team, goals for therapy  - Pt was educated in lymphedema etiology and management plans--given 1/9/20 and reviewed today  - Pt was provided with written risk reductions and precautions for managing lymphedema--given 1/9/20 and reviewed today.     Pt has no cultural, educational or language barriers to learning provided.  Treatment and Instruction of Home Exercise Program    Time In:  Time " Out:     Paul received individual therapeutic Activitiy to decrease RUE edema  15 minutes including the following:   Self MLD techniques;  Short Neck  Clear Healthy Lymph Nodes:  Left Axilla                                                  Right Groin  Open Anastomoses:  Anterior Axillo-Axillary  (AAA)                                    Axillo-Inguinal     (AI)                                    Posterior Axillo-Axillary  (GEREMIAS)  Right Upper Arm (Lateral and Medial)  Right Antecubital Fossa  Right Dorsal Forearm  Right Volar Forearm  Dorsum Right Hand      Paul received the following manual therapy techniques were perfIndependentormed to increased myofascial/soft tissue length, mobility and pliability, increase PROM, AROM and function as well as to decrease pain for 30 minutes    Short Neck  Clear Healthy Lymph Nodes:  Left Axilla                                                  Right Groin  Open Anastomoses:  Anterior Axillo-Axillary  (AAA)                                    Axillo-Inguinal     (AI)                                    Posterior Axillo-Axillary  (GEREMIAS)  Right Upper Arm (Lateral and Medial)  Right Antecubital Fossa  Right Dorsal Forearm  Right Volar Forearm  Dorsum Right Hand    Rework Right UE  Rework Anastomoses  Rework Healthy lymph Nodes    Applied short stretch bandaging to RUE to decrease edema      Written Home Exercises Provided and Patient Education: Handouts given   See EMR under patient instructions for program given  Pt demonstrated good understanding of the education provided. Patient demo good return demo of skill of exercises.    Functional Limitations Reporting      CMS Impairment/Limitation/Restriction for FOTO Outcome  Survey    Therapist reviewed FOTO scores for Paul Clintonshilpa Michael on 2/18/2020.   FOTO documents entered into [employer] - see Media section.    Limitations Score: 0%  Category: Carrying             Assessment   This is a 44 y.o. male referred to outpatient physical therapy and  presents with a medical diagnosis of right breast cancer and was seen today post-operatively to establish PT plan of care for impairments following surgery including: Stage 2 lymphedema of right hand and arm with hyperkeratosis of right arm..     Pt instructed to wear short stretch bandages for at least 24 hours and for 48 hours if possible (returns to clinic 2/20/20). Patient instructed to apply tubigrip sleeve (size G) if he removes short stretch bandages. I will obtain a prescription for a compression glove and sleeve.for patient. Pt will continue to benefit from skilled physical therapy to address the impairments stated in chart below, provide patient/family education and to maximize pt's level of independence in home and community environment     Anticipated barriers to physical therapy: None     Pt's spiritual, cultural and educational needs considered and pt agreeable to plan of care and goals as stated below:     Medical necessity is demonstrated by the following IMPAIRMENTS/PROMBLEM LIST:    History  Co-morbidities and personal factors that may impact the plan of care Co-morbidities:   diabetes, history of cancer and HTN    Personal Factors:   no deficits     moderate   Examination  Body Structures and Functions, activity limitations and participation restrictions that may impact the plan of care Body Regions:   upper extremities    Body Systems:    edema    Participation Restrictions:   No Deficits    Activity limitations:   Learning and applying knowledge  no deficits    General Tasks and Commands  no deficits    Communication  no deficits    Mobility  lifting and carrying objects    Self care  no deficits    Domestic Life  no deficits    Interactions/Relationships  no deficits    Life Areas  no deficits    Community and Social Life  no deficits         moderate   Clinical Presentation evolving clinical presentation with changing clinical characteristics moderate   Decision Making/ Complexity Score:  moderate       Goals: Pt agrees with goals set    Short Term goals: 3 weeks  1. Patient will demonstrate 100% understanding of lymphedema risk reduction practices to include self monitoring for lymphedema. (progressing, not met)  2. Patient will demonstrate independence with Home Exercise program established. (progressing, not met)  3. Decrease girth in right upper arm, forearm, wrist and hand by up to 2cm to allow for improved UE symmetry, clothing choice, ROM, and UE function. (progressing, not met)  4. Patient will perform self lymph drainage techniques to enhance lymphatic drainage and mobility.  (progressing, not met)      Long Term Goals: 6 weeks  5. Patient to show decreased girth in right UE by up to 3cm to allow for improved UE symmetry, clothing choice, ROM, and UE function.  (progressing, not met)  8. Patient will show reduction in density to mild or less with improved contour of limb to allow for improved cosmesis, improved lymphatic drainage, and functional mobility.  (progressing, not met)  9. Patient to bruna/doff compression garment with daily compliance to support lymphatic mobility and skin elasticity.  (progressing, not met)      Plan   Outpatient physical therapy 2x week for 6 weeks to include the following:   Manual Therapy, Patient Education, Self Care, Therapeutic Activites and Therapeutic Exercise.    Plan of care Certification Period: 2/18/2020 to 4/3/2020.    Therapist: Vannessa Kasper, PT  2/18/2020

## 2020-02-19 ENCOUNTER — PATIENT OUTREACH (OUTPATIENT)
Dept: ADMINISTRATIVE | Facility: OTHER | Age: 45
End: 2020-02-19

## 2020-02-19 PROCEDURE — 77387 GUIDANCE FOR RADJ TX DLVR: CPT | Mod: ,,, | Performed by: RADIOLOGY

## 2020-02-19 PROCEDURE — 77385 HC IMRT, SIMPLE: CPT | Performed by: RADIOLOGY

## 2020-02-19 PROCEDURE — 77387 PR GUIDANCE FOR RADIATION TREATMENT DELIVERY: ICD-10-PCS | Mod: ,,, | Performed by: RADIOLOGY

## 2020-02-20 ENCOUNTER — DOCUMENTATION ONLY (OUTPATIENT)
Dept: RADIATION ONCOLOGY | Facility: CLINIC | Age: 45
End: 2020-02-20

## 2020-02-20 ENCOUNTER — CLINICAL SUPPORT (OUTPATIENT)
Dept: REHABILITATION | Facility: HOSPITAL | Age: 45
End: 2020-02-20
Attending: SURGERY

## 2020-02-20 DIAGNOSIS — C50.021: ICD-10-CM

## 2020-02-20 DIAGNOSIS — I89.0 LYMPHEDEMA OF RIGHT UPPER EXTREMITY: ICD-10-CM

## 2020-02-20 DIAGNOSIS — C50.021 MALIGNANT NEOPLASM INVOLVING BOTH NIPPLE AND AREOLA OF RIGHT BREAST IN MALE, ESTROGEN RECEPTOR NEGATIVE: ICD-10-CM

## 2020-02-20 DIAGNOSIS — I89.0 LYMPHEDEMA OF RIGHT ARM: ICD-10-CM

## 2020-02-20 DIAGNOSIS — Z17.1 MALIGNANT NEOPLASM INVOLVING BOTH NIPPLE AND AREOLA OF RIGHT BREAST IN MALE, ESTROGEN RECEPTOR NEGATIVE: ICD-10-CM

## 2020-02-20 DIAGNOSIS — Z17.1: ICD-10-CM

## 2020-02-20 DIAGNOSIS — I89.0 LYMPHEDEMA OF ARM: Primary | ICD-10-CM

## 2020-02-20 PROCEDURE — 77385 HC IMRT, SIMPLE: CPT | Performed by: RADIOLOGY

## 2020-02-20 PROCEDURE — 77387 GUIDANCE FOR RADJ TX DLVR: CPT | Mod: ,,, | Performed by: RADIOLOGY

## 2020-02-20 PROCEDURE — 97140 MANUAL THERAPY 1/> REGIONS: CPT | Performed by: PHYSICAL MEDICINE & REHABILITATION

## 2020-02-20 PROCEDURE — 97530 THERAPEUTIC ACTIVITIES: CPT | Performed by: PHYSICAL MEDICINE & REHABILITATION

## 2020-02-20 PROCEDURE — 77387 PR GUIDANCE FOR RADIATION TREATMENT DELIVERY: ICD-10-PCS | Mod: ,,, | Performed by: RADIOLOGY

## 2020-02-20 NOTE — PLAN OF CARE
Pt. On day 13 of outpt. Xrt to right chestwall.  Doing well.  Skin intact.  C/o edema to Right upper extremity.

## 2020-02-20 NOTE — PROGRESS NOTES
Physical Therapy Daily Treatment Note     Name: Paul Li Jr.  Clinic Number: [id number]  Diagnosis:   Encounter Diagnoses   Name Primary?    Lymphedema of right arm     Malignant neoplasm involving both nipple and areola of right breast in male, estrogen receptor negative      Physician: Shima Whyte MD    Precautions: Lymphedema RUE  Visit #: 2 of 5  Time In: 2:10 PM  Time Out: 3:00 PM  Total Treatment Time 1:1: 50    Evaluation Date: 2/18/2020  Visit # authorized: 5  Authorization period: 12/31/2020  Plan of care Expiration: 4/3/2020  MD referral: Dr. Shima Whyte  Insurance: Uninsured      Subjective     Pt reports: he bought a compression glove at [employer] and has been wearing to right hand. States has been performing self MLD techniques and wearing tubigrip sleeve to right arm  Pain Scale: Paul rates pain on a scale of 0/10 on VAS.   Pain location: NA    Fatigue: Mild  Functional change: No Change  Diagnosis: Right breast IDC, Stage 1b. Grade 2, ER (-), DE (-), HER2 (-)  Chief complaint: having swelling in RUE since surgery   Surgical History: 12/17/19 right axillary lymph node dissection and resection excess lateral tissue; 11/22/19 right simple mastectomy with SLNB; total of 18 lymph nodes removed  Paul Li Jr.  has a past surgical history that includes Insertion of tunneled central venous catheter (CVC) with subcutaneous port (Left, 5/24/2019); Simple mastectomy (Right, 11/22/2019); [place] lymph node biopsy (Right, 11/22/2019); Axillary node dissection (Right, 11/22/2019); and Axillary node dissection (Right, 12/17/2019).     Chemotherapy: te-adjuvant chemo therapy completed 10/19  Radiation: began 2/3/2020--has ~3 weeks left    Objective     Paul  received the following manual therapy techniques were perfIndependentormed to increased myofascial/soft tissue length, mobility and pliability, increase PROM, AROM and function as well as to decrease  "pain for 30 minutes     Short Neck  Clear Healthy Lymph Nodes:  Left Axilla                                                  Right Groin  Open Anastomoses:  Anterior Axillo-Axillary  (AAA)--Avoided right anterior chest due to radiation                                    Axillo-Inguinal     (AI)--Avoided right anterior chest due to radiation                                    Posterior Axillo-Axillary  (GEREMIAS)  Right Upper Arm (Lateral and Medial)  Right Antecubital Fossa  Right Dorsal Forearm  Right Volar Forearm  Dorsum Right Hand     Rework Right UE  Rework Anastomoses  Rework Healthy lymph Nodes     Applied short stretch bandaging to RUE to decrease edema    Paul received individual therapeutic Activitiy to decrease RUE edema  20 minutes including the following:   Self MLD techniques;  Short Neck  Clear Healthy Lymph Nodes:  Left Axilla                                                  Right Groin  Open Anastomoses:  Anterior Axillo-Axillary  (AAA)--Avoided right anterior chest due to radiation                                    Axillo-Inguinal     (AI)--Avoided right anterior chest due to radiation                                    Posterior Axillo-Axillary  (GEREMIAS)  Right Upper Arm (Lateral and Medial)  Right Antecubital Fossa  Right Dorsal Forearm  Right Volar Forearm  Dorsum Right Hand    LANDMARK RUE Diff RUE Diff RUE Diff RUE Diff RIGHT UE LEFT UE DIFFERENCE   Date 2/20/20 2/20/20 2/18/20 2/18/20 1/20/20 1/20/20 1/16/20 1/16/20 1/9/20 1/9/20 1/9/20   E + 8" 53.4 cm 1cm dec 54.5 cm No chg 54.5 cm 0.5 cm dec 55 cm No Chg 55 cm 53 cm 2 cm   E + 6" 50 cm No chg 50 cm No chg 50 cm 3 cm inc 47 cm 3 cm dec 51 cm 48 cm 3 cm   E + 4" 46 cm 1 cm dec 47 cm 0.5 cm inc 46.5 cm 0.5 cm dec 47 cm 1 cm dec 48 cm 44 cm 4 cm   E + 2" 43 cm 1cm dec 44 cm 1.5 cm inc 42.5 cm 0.5 cm dec 43 cm 2 cm dec 45 cm 40 cm 5 cm   Elbow 39 cm 1.5 cm dec 40.5 cm 1 cm inc 39.5 cm No chg 39 cm 0.5 cm dec 39.5 cm 37.5 cm 2 cm   W+ 8" 40 cm 0.5 cm " "dec 40.5 cm 0.5 cm inc 40 cm 1 cm inc 39 cm 1 cm dec 40 cm 37 cm 3 cm   W +  6" 37 cm No chg 37 cm 0.5 cm inc 36.5 cm 1.5 cm inc 35 cm 1 cm dec 36 cm 32 cm 4 cm   W + 4" 31.5 cm 0.5 cm 32 cm 1 cm inc 31 cm 1 cm inc 30 cm No chg 30 cm 27 cm 3 cm   Wrist 22 cm 1 cm dec 23 cm 1 cm inc 22 cm No chg 22 cm  No chg 22 cm 21 cm 1 cm   DPC 30 cm No chg 30 cm 0.5 cm inc 29.5 cm No chg 29.5 cm 0.5 cm dec 30 cm 28 cm 2 cm   IP Thumb 8 cm 0.5 cm 8.5 cm 0.5 cm inc 8 cm No chg 8 cm  0.f cm dec 8.5 cm 8.0. cm 0.5 cm      Functional Limitations Reporting       CMS Impairment/Limitation/Restriction for FOTO Outcome  Survey     Therapist reviewed FOTO scores for Paul Li Jr. on 2/18/2020.   FOTO documents entered into [employer] - see Media section.     Limitations Score: 0%  Category: Carrying         Home Exercise Program and Patient Education     Education provided re:  - role of PT in multi - disciplinary team, goals for PT  - progress towards goals     See EMR under notes/patient instructions for HEP given/taught this session - all sets and reps included. Pt received printed copy.     Pt was able to demonstrate and report understanding and performance  Pt has no cultural, educational or language barriers to learning provided.    Assessment     Patient is responding well to physical therapy.   Pain after treatment: 0/10    Pt prognosis is Good. Patient received manual therapy of CDT of MLD as above. Girth RUE has decreased from 0.5 cm to 1.5 cm.Reviewed self MLD techniques and patient is independent with these techniques. Patient given prescription to obtain a compression sleeve for RUE. Patient to continue to wear tubigrip sleeve. Once he has compression sleeve, will wear compression sleeve and glove during day and lighter tubigrip sleeve at night. Patient instructed to continue with self MLD daily. Pt will continue to benefit from skilled outpatient physical therapy to address the deficits listed in the problem list chart on " initial evaluation, provide pt/family education and to maximize pt's level of independence in the home and community environment. Patient met 1 STG this session    Goals as follows:  Short Term goals: 3 weeks  1. Patient will demonstrate 100% understanding of lymphedema risk reduction practices to include self monitoring for lymphedema. (met, 2/20/20)  2. Decrease girth in right upper arm, forearm, wrist and hand by up to 2cm to allow for improved UE symmetry, clothing choice, ROM, and UE function. (progressing, not met)  3. Patient will perform self lymph drainage techniques to enhance lymphatic drainage and mobility.  (progressing, not met)        Long Term Goals: 6 weeks  5. Patient to show decreased girth in right UE by up to 3cm to allow for improved UE symmetry, clothing choice, ROM, and UE function.  (progressing, not met)  8. Patient will show reduction in density to mild or less with improved contour of limb to allow for improved cosmesis, improved lymphatic drainage, and functional mobility.  (progressing, not met)  9. Patient to bruna/doff compression garment with daily compliance to support lymphatic      Plan     Continue with established POC toward physical therapy goals    Therapist: Vannessa Kasper, PT  2/20/2020

## 2020-02-21 ENCOUNTER — PATIENT MESSAGE (OUTPATIENT)
Dept: DIABETES | Facility: CLINIC | Age: 45
End: 2020-02-21

## 2020-02-21 ENCOUNTER — NUTRITION (OUTPATIENT)
Dept: DIABETES | Facility: CLINIC | Age: 45
End: 2020-02-21

## 2020-02-21 DIAGNOSIS — E11.65 UNCONTROLLED TYPE 2 DIABETES MELLITUS WITH HYPERGLYCEMIA: Primary | ICD-10-CM

## 2020-02-21 DIAGNOSIS — E11.65 UNCONTROLLED TYPE 2 DIABETES MELLITUS WITH HYPERGLYCEMIA: ICD-10-CM

## 2020-02-21 PROCEDURE — 77387 GUIDANCE FOR RADJ TX DLVR: CPT | Mod: ,,, | Performed by: RADIOLOGY

## 2020-02-21 PROCEDURE — 77385 HC IMRT, SIMPLE: CPT | Performed by: RADIOLOGY

## 2020-02-21 PROCEDURE — 77387 PR GUIDANCE FOR RADIATION TREATMENT DELIVERY: ICD-10-PCS | Mod: ,,, | Performed by: RADIOLOGY

## 2020-02-21 PROCEDURE — G0108 DIAB MANAGE TRN  PER INDIV: HCPCS | Mod: PBBFAC | Performed by: DIETITIAN, REGISTERED

## 2020-02-21 RX ORDER — METFORMIN HYDROCHLORIDE 500 MG/1
1000 TABLET ORAL 2 TIMES DAILY WITH MEALS
Qty: 90 TABLET | Refills: 3 | Status: SHIPPED | OUTPATIENT
Start: 2020-02-21 | End: 2020-11-03 | Stop reason: SDUPTHER

## 2020-02-24 PROCEDURE — 77385 HC IMRT, SIMPLE: CPT | Performed by: RADIOLOGY

## 2020-02-24 PROCEDURE — 77387 GUIDANCE FOR RADJ TX DLVR: CPT | Mod: ,,, | Performed by: RADIOLOGY

## 2020-02-24 PROCEDURE — 77387 PR GUIDANCE FOR RADIATION TREATMENT DELIVERY: ICD-10-PCS | Mod: ,,, | Performed by: RADIOLOGY

## 2020-02-24 PROCEDURE — 77336 RADIATION PHYSICS CONSULT: CPT | Performed by: RADIOLOGY

## 2020-02-26 PROCEDURE — 77417 THER RADIOLOGY PORT IMAGE(S): CPT | Performed by: RADIOLOGY

## 2020-02-26 PROCEDURE — 77387 GUIDANCE FOR RADJ TX DLVR: CPT | Mod: ,,, | Performed by: RADIOLOGY

## 2020-02-26 PROCEDURE — 77385 HC IMRT, SIMPLE: CPT | Performed by: RADIOLOGY

## 2020-02-26 PROCEDURE — 77387 PR GUIDANCE FOR RADIATION TREATMENT DELIVERY: ICD-10-PCS | Mod: ,,, | Performed by: RADIOLOGY

## 2020-02-27 ENCOUNTER — DOCUMENTATION ONLY (OUTPATIENT)
Dept: RADIATION ONCOLOGY | Facility: CLINIC | Age: 45
End: 2020-02-27

## 2020-02-27 ENCOUNTER — OFFICE VISIT (OUTPATIENT)
Dept: FAMILY MEDICINE | Facility: CLINIC | Age: 45
End: 2020-02-27

## 2020-02-27 VITALS
WEIGHT: 315 LBS | SYSTOLIC BLOOD PRESSURE: 130 MMHG | HEART RATE: 73 BPM | BODY MASS INDEX: 40.43 KG/M2 | TEMPERATURE: 98 F | OXYGEN SATURATION: 99 % | DIASTOLIC BLOOD PRESSURE: 84 MMHG | HEIGHT: 74 IN

## 2020-02-27 DIAGNOSIS — T45.1X5A CHEMOTHERAPY-INDUCED NEUROPATHY: ICD-10-CM

## 2020-02-27 DIAGNOSIS — G62.0 CHEMOTHERAPY-INDUCED NEUROPATHY: ICD-10-CM

## 2020-02-27 DIAGNOSIS — I89.0 LYMPHEDEMA OF RIGHT ARM: ICD-10-CM

## 2020-02-27 DIAGNOSIS — Z17.1 MALIGNANT NEOPLASM INVOLVING BOTH NIPPLE AND AREOLA OF RIGHT BREAST IN MALE, ESTROGEN RECEPTOR NEGATIVE: ICD-10-CM

## 2020-02-27 DIAGNOSIS — C50.021 MALIGNANT NEOPLASM INVOLVING BOTH NIPPLE AND AREOLA OF RIGHT BREAST IN MALE, ESTROGEN RECEPTOR NEGATIVE: ICD-10-CM

## 2020-02-27 DIAGNOSIS — I10 ESSENTIAL HYPERTENSION: Primary | ICD-10-CM

## 2020-02-27 PROCEDURE — 77385 HC IMRT, SIMPLE: CPT | Performed by: RADIOLOGY

## 2020-02-27 PROCEDURE — 99214 OFFICE O/P EST MOD 30 MIN: CPT | Mod: PBBFAC,25,PO | Performed by: FAMILY MEDICINE

## 2020-02-27 PROCEDURE — 77387 GUIDANCE FOR RADJ TX DLVR: CPT | Mod: ,,, | Performed by: RADIOLOGY

## 2020-02-27 PROCEDURE — 99214 PR OFFICE/OUTPT VISIT, EST, LEVL IV, 30-39 MIN: ICD-10-PCS | Mod: S$PBB,,, | Performed by: FAMILY MEDICINE

## 2020-02-27 PROCEDURE — 99999 PR PBB SHADOW E&M-EST. PATIENT-LVL IV: ICD-10-PCS | Mod: PBBFAC,,, | Performed by: FAMILY MEDICINE

## 2020-02-27 PROCEDURE — 99214 OFFICE O/P EST MOD 30 MIN: CPT | Mod: S$PBB,,, | Performed by: FAMILY MEDICINE

## 2020-02-27 PROCEDURE — 77387 PR GUIDANCE FOR RADIATION TREATMENT DELIVERY: ICD-10-PCS | Mod: ,,, | Performed by: RADIOLOGY

## 2020-02-27 PROCEDURE — 99999 PR PBB SHADOW E&M-EST. PATIENT-LVL IV: CPT | Mod: PBBFAC,,, | Performed by: FAMILY MEDICINE

## 2020-02-27 NOTE — PROGRESS NOTES
Subjective:       Patient ID: Paul Li Jr. is a 44 y.o. male.    Chief Complaint: 6 month follow up    Here for 6 month f/u    diabetes - only using novolog with sliding, metformin XR 500mg 2 tabs BID, Trulicity, jardiance; following with endocrinology   BGs   HTN - taking norvasc 10mg daily, losartan 100 HCTZ 25 daily  HLD - taking lipitor 20mg daily  Chemo-induced neuropathy - taking gabapentin 300mg TID  Breast cancer - following with Dr. Whyte and Dr. Bahena; doing radiation presently; some right arm lymphadenopathy  Started on Lexapro 10mg in December when he was feeling emotional. Now reports increased iritability and he does not like the way he feels on the medication    Living with family on [facility] presently      Past Medical History:   Diagnosis Date    Arthritis     Cancer     R breast    Diabetes mellitus     HTN (hypertension)     Leg edema, right     Prediabetes     Seizures     at age 18 - stress related    Therapy     marital counseling       Past Surgical History:   Procedure Laterality Date    AXILLARY NODE DISSECTION Right 11/22/2019    Procedure: LYMPHADENECTOMY, AXILLARY RIGHT;  Surgeon: Shima Whyte MD;  Location: [facility];  Service: General;  Laterality: Right;    AXILLARY NODE DISSECTION Right 12/17/2019    Procedure: LYMPHADENECTOMY, AXILLARY;  Surgeon: Shima Whyte MD;  Location: [facility] OR [address] [place] [address];  Service: General;  Laterality: Right;    INSERTION OF TUNNELED CENTRAL VENOUS CATHETER (CVC) WITH SUBCUTANEOUS PORT Left 5/24/2019    Procedure: [id number];  Surgeon: Shima Whyte MD;  [facility];  Service: General;  Laterality: Left;    SENTINEL LYMPH NODE BIOPSY Right 11/22/2019    Procedure: BIOPSY, LYMPH NODE, SENTINEL RIGHT;  Surgeon: Shima Whyte MD;  Location: [facility];  Service: General;  Laterality: Right;    SIMPLE MASTECTOMY Right 11/22/2019    Procedure: MASTECTOMY, SIMPLE RIGHT (CONSENT AM OF) 2.5 hr case;  Surgeon: Shima Whyte  MD;  Location: [facility];  Service: General;  Laterality: Right;       Review of patient's allergies indicates:  No Known Allergies    Social History     Socioeconomic History    Marital status: Single     Spouse name: Not on file    Number of children: 3    Years of education: Not on file    Highest education level: Not on file   Occupational History    Occupation:    Social Needs    Financial resource strain: Not very hard    Food insecurity:     Worry: Never true     Inability: Never true    Transportation needs:     Medical: Yes     Non-medical: No   Tobacco Use    Smoking status: Former Smoker     Packs/day: 0.25     Years: 15.00     Pack years: 3.75     Types: Cigarettes     Last attempt to quit: 2014     Years since quittin.2    Smokeless tobacco: Never Used    Tobacco comment: Social smoker with alcohol    Substance and Sexual Activity    Alcohol use: Yes     Alcohol/week: 0.0 standard drinks     Frequency: Monthly or less     Drinks per session: 1 or 2     Binge frequency: Never     Comment: Rarely    Drug use: Never    Sexual activity: Yes     Partners: Female     Birth control/protection: None   Lifestyle    Physical activity:     Days per week: 0 days     Minutes per session: Not on file    Stress: Only a little   Relationships    Social connections:     Talks on phone: More than three times a week     Gets together: Patient refused     Attends Mandaen service: Not on file     Active member of club or organization: No     Attends meetings of clubs or organizations: Never     Relationship status:    Other Topics Concern    Patient feels they ought to cut down on drinking/drug use Not Asked    Patient annoyed by others criticizing their drinking/drug use Not Asked    Patient has felt bad or guilty about drinking/drug use Not Asked    Patient has had a drink/used drugs as an eye opener in the AM Not Asked   Social History Narrative    From [place]     Lives with wife    3 sons in college    Wife recent loss of 6 month pregnancy (May 1, 2019)       Current Outpatient Medications on File Prior to Visit   Medication Sig Dispense Refill    amLODIPine (NORVASC) 10 MG tablet TAKE 1 TABLET (10 MG) BY MOUTH EVERY DAY (Patient taking differently: Take 10 mg by mouth every morning. ) 30 tablet 10    atorvastatin (LIPITOR) 20 MG tablet Take 1 tablet (20 mg total) by mouth once daily. 90 tablet 3    blood sugar diagnostic Strp To check BG 4 times daily, to use with insurance preferred meter 400 each 3    blood-glucose meter kit Use as instructed 1 each 0    dulaglutide (TRULICITY) 1.5 mg/0.5 mL PnIj Inject 1.5 mg into the skin once a week. Trulicity 0.75mg weekly for 4 weeks & then increase to 1.5 mg weekly indefinitely. 6 mL 3    empagliflozin (JARDIANCE) 25 mg Tab Take 1 tablet (25 mg) by mouth once daily. 30 tablet 3    gabapentin (NEURONTIN) 300 MG capsule Take 1 capsule (300 mg total) by mouth 3 (three) times daily. 90 capsule 11    hydroCHLOROthiazide (HYDRODIURIL) 25 MG tablet TAKE 1 TABLET BY MOUTH ONCE DAILY 90 tablet 3    insulin aspart U-100 (NOVOLOG) 100 unit/mL (3 mL) InPn pen Inject 24 Units into the skin 3 (three) times daily. (Patient taking differently: Inject 16 Units into the skin 3 (three) times daily. ) 64.8 mL 3    insulin detemir U-100 (LEVEMIR FLEXTOUCH) 100 unit/mL (3 mL) SubQ InPn pen Inject 40 Units into the skin 2 (two) times daily. (Patient taking differently: Inject 36 Units into the skin nightly. ) 24 mL 11    lancets 33 gauge Misc Use 2 (two) times daily with meals. 100 each 11    lancets 33 gauge Misc To check BG 4 times daily, to use with insurance preferred meter 200 each 11    lancing device Misc 1 Device by Misc.(Non-Drug; Combo Route) route 2 (two) times daily with meals. 1 each 0    lidocaine-prilocaine (EMLA) cream Apply topically to affected area 45 minutes before port access 30 g 1    losartan (COZAAR) 100 MG tablet  "TAKE 1 TABLET BY MOUTH ONCE DAILY 90 tablet 3    metFORMIN (GLUCOPHAGE) 500 MG tablet Take 2 tablets (1,000 mg total) by mouth 2 (two) times daily with meals. 90 tablet 3    ondansetron (ZOFRAN-ODT) 8 MG TbDL Take 1 tablet (8 mg total) by mouth every 12 (twelve) hours as needed. 20 tablet 1    oxyCODONE-acetaminophen (PERCOCET) 5-325 mg per tablet Take 1 tablet by mouth every 4 (four) hours as needed for Pain. 40 tablet 0    oxyCODONE-acetaminophen (PERCOCET) 5-325 mg per tablet Take 1 tablet by mouth every 6 (six) hours as needed. 10 tablet 0    pen needle, diabetic (BD ULTRA-FINE TANESHA PEN NEEDLE) 32 gauge x 5/32" Ndle Use as directed with novolog and levemir 100 each 5    senna (SENOKOT) 8.6 mg tablet Take 1 tablet by mouth once daily.      tadalafil (CIALIS) 5 MG tablet Take 2 tablets (10 mg total) by mouth daily as needed for Erectile Dysfunction. 20 tablet 1    [DISCONTINUED] escitalopram oxalate (LEXAPRO) 10 MG tablet Take 1 tablet (10 mg total) by mouth once daily. 30 tablet 11    flash glucose scanning reader (FREESTYLE MARIANO 14 DAY READER) Misc Check glucose continuousl;y (Patient not taking: Reported on 2/27/2020) 1 each 0    flash glucose sensor (FREESTYLE MARIANO 14 DAY SENSOR) Kit Check glucose continously (Patient not taking: Reported on 2/27/2020) 1 kit 11    [DISCONTINUED] losartan-hydrochlorothiazide 100-25 mg (HYZAAR) 100-25 mg per tablet TAKE 1 TABLET BY MOUTH EVERY DAY 90 tablet 3     Current Facility-Administered Medications on File Prior to Visit   Medication Dose Route Frequency Provider Last Rate Last Dose    heparin, porcine (PF) 100 unit/mL injection flush 500 Units  500 Units Intravenous 1 time in Clinic/HOD Richa Bahena MD        sodium chloride 0.9% flush 10 mL  10 mL Intravenous 1 time in Clinic/HOD Richa Bahena MD           Family History   Problem Relation Age of Onset    Hypertension Mother     Diabetes Mother     Hypertension Father     Lupus Father     " "Post-traumatic stress disorder Brother     No Known Problems Maternal Grandmother     Colon cancer Maternal Grandfather     Breast cancer Paternal Grandmother     No Known Problems Paternal Grandfather     No Known Problems Sister     No Known Problems Maternal Aunt     No Known Problems Maternal Uncle     Fibromyalgia Paternal Aunt     Lupus Paternal Aunt     No Known Problems Paternal Uncle     No Known Problems Brother     No Known Problems Sister     No Known Problems Son     No Known Problems Son     No Known Problems Son     No Known Problems Son        Review of Systems   Constitutional: Negative for appetite change, chills, fever and unexpected weight change.   HENT: Negative for sore throat and trouble swallowing.    Eyes: Negative for pain and visual disturbance.   Respiratory: Negative for cough, chest tightness, shortness of breath and wheezing.    Cardiovascular: Negative for chest pain, palpitations and leg swelling.   Gastrointestinal: Negative for abdominal pain, blood in stool, constipation, diarrhea and nausea.   Genitourinary: Negative for difficulty urinating, dysuria and hematuria.   Musculoskeletal: Negative for arthralgias, gait problem and neck pain.   Skin: Negative for rash and wound.   Neurological: Positive for numbness (feet with sitting). Negative for dizziness, weakness, light-headedness and headaches.   Hematological: Negative for adenopathy.   Psychiatric/Behavioral: Negative for dysphoric mood.       Objective:      /84   Pulse 73   Temp 97.8 °F (36.6 °C) (Temporal)   Ht 6' 2" (1.88 m)   Wt (!) 184.9 kg (407 lb 10.1 oz)   SpO2 99%   BMI 52.34 kg/m²   Physical Exam   Constitutional: He is oriented to person, place, and time. He appears well-developed and well-nourished. He is active. No distress.   HENT:   Head: Normocephalic and atraumatic.   Right Ear: External ear normal.   Left Ear: External ear normal.   Mouth/Throat: Uvula is midline, oropharynx is " clear and moist and mucous membranes are normal. No oropharyngeal exudate.   Eyes: Pupils are equal, round, and reactive to light. Conjunctivae, EOM and lids are normal.   Neck: Normal range of motion, full passive range of motion without pain and phonation normal. Neck supple. No thyroid mass and no thyromegaly present.   Cardiovascular: Normal rate, regular rhythm, normal heart sounds and intact distal pulses. Exam reveals no gallop and no friction rub.   No murmur heard.  Pulmonary/Chest: Effort normal and breath sounds normal. No respiratory distress. He has no wheezes. He has no rales.   Left chest-wall port   Musculoskeletal: Normal range of motion.   Lymphadenopathy:     He has no cervical adenopathy.   Neurological: He is alert and oriented to person, place, and time. No cranial nerve deficit. Coordination normal.   Skin: Skin is warm and dry.   Psychiatric: He has a normal mood and affect. His speech is normal and behavior is normal. Judgment and thought content normal.       Results for orders placed or performed in visit on 02/14/20   CBC Oncology   Result Value Ref Range    WBC 2.97 (L) 3.90 - 12.70 K/uL    RBC 5.05 4.60 - 6.20 M/uL    Hemoglobin 11.7 (L) 14.0 - 18.0 g/dL    Hematocrit 38.8 (L) 40.0 - 54.0 %    Mean Corpuscular Volume 77 (L) 82 - 98 fL    Mean Corpuscular Hemoglobin 23.2 (L) 27.0 - 31.0 pg    Mean Corpuscular Hemoglobin Conc 30.2 (L) 32.0 - 36.0 g/dL    RDW 16.2 (H) 11.5 - 14.5 %    Platelets 188 150 - 350 K/uL    MPV 9.8 9.2 - 12.9 fL    Gran # (ANC) 1.7 (L) 1.8 - 7.7 K/uL    Immature Grans (Abs) 0.01 0.00 - 0.04 K/uL   Comprehensive metabolic panel   Result Value Ref Range    Sodium 137 136 - 145 mmol/L    Potassium 4.0 3.5 - 5.1 mmol/L    Chloride 104 95 - 110 mmol/L    CO2 26 23 - 29 mmol/L    Glucose 94 70 - 110 mg/dL    BUN, Bld 9 6 - 20 mg/dL    Creatinine 1.2 0.5 - 1.4 mg/dL    Calcium 9.1 8.7 - 10.5 mg/dL    Total Protein 7.8 6.0 - 8.4 g/dL    Albumin 3.9 3.5 - 5.2 g/dL    Total  Bilirubin 0.6 0.1 - 1.0 mg/dL    Alkaline Phosphatase 89 55 - 135 U/L    AST 14 10 - 40 U/L    ALT 17 10 - 44 U/L    Anion Gap 7 (L) 8 - 16 mmol/L    eGFR if African American >60.0 >60 mL/min/1.73 m^2    eGFR if non African American >60.0 >60 mL/min/1.73 m^2         Assessment:       1. Essential hypertension    2. Malignant neoplasm involving both nipple and areola of right breast in male, estrogen receptor negative    3. Chemotherapy-induced neuropathy    4. Lymphedema of right arm        Plan:       Essential hypertension    Malignant neoplasm involving both nipple and areola of right breast in male, estrogen receptor negative    Chemotherapy-induced neuropathy    Lymphedema of right arm      stop lexapro  continue other present meds  Follow-up with diabetes education and endocrinology follow-up  F/u with radiation therapy and oncology as planned  Counseled on regular exercise, maintenance of a healthy weight, balanced diet rich in fruits/vegetables and lean protein, and avoidance of unhealthy habits like smoking and excessive alcohol intake.  F/u PRN

## 2020-02-28 PROCEDURE — 77385 HC IMRT, SIMPLE: CPT | Performed by: RADIOLOGY

## 2020-02-28 PROCEDURE — 77387 PR GUIDANCE FOR RADIATION TREATMENT DELIVERY: ICD-10-PCS | Mod: ,,, | Performed by: RADIOLOGY

## 2020-02-28 PROCEDURE — 77387 GUIDANCE FOR RADJ TX DLVR: CPT | Mod: ,,, | Performed by: RADIOLOGY

## 2020-03-02 ENCOUNTER — HOSPITAL ENCOUNTER (OUTPATIENT)
Dept: RADIATION THERAPY | Facility: HOSPITAL | Age: 45
Discharge: HOME | End: 2020-03-02
Attending: RADIOLOGY
Payer: OTHER GOVERNMENT

## 2020-03-02 PROCEDURE — 77336 RADIATION PHYSICS CONSULT: CPT | Performed by: RADIOLOGY

## 2020-03-02 PROCEDURE — 77385 HC IMRT, SIMPLE: CPT | Performed by: RADIOLOGY

## 2020-03-02 PROCEDURE — 77387 PR GUIDANCE FOR RADIATION TREATMENT DELIVERY: ICD-10-PCS | Mod: ,,, | Performed by: RADIOLOGY

## 2020-03-02 PROCEDURE — 77387 GUIDANCE FOR RADJ TX DLVR: CPT | Mod: ,,, | Performed by: RADIOLOGY

## 2020-03-03 PROCEDURE — 77387 GUIDANCE FOR RADJ TX DLVR: CPT | Mod: ,,, | Performed by: RADIOLOGY

## 2020-03-03 PROCEDURE — 77387 PR GUIDANCE FOR RADIATION TREATMENT DELIVERY: ICD-10-PCS | Mod: ,,, | Performed by: RADIOLOGY

## 2020-03-03 PROCEDURE — 77385 HC IMRT, SIMPLE: CPT | Performed by: RADIOLOGY

## 2020-03-04 PROCEDURE — 77387 PR GUIDANCE FOR RADIATION TREATMENT DELIVERY: ICD-10-PCS | Mod: ,,, | Performed by: RADIOLOGY

## 2020-03-04 PROCEDURE — 77387 GUIDANCE FOR RADJ TX DLVR: CPT | Mod: ,,, | Performed by: RADIOLOGY

## 2020-03-04 PROCEDURE — 77385 HC IMRT, SIMPLE: CPT | Performed by: RADIOLOGY

## 2020-03-05 ENCOUNTER — DOCUMENTATION ONLY (OUTPATIENT)
Dept: RADIATION ONCOLOGY | Facility: CLINIC | Age: 45
End: 2020-03-05

## 2020-03-05 ENCOUNTER — TELEPHONE (OUTPATIENT)
Dept: REHABILITATION | Facility: HOSPITAL | Age: 45
End: 2020-03-05

## 2020-03-05 PROCEDURE — 77387 PR GUIDANCE FOR RADIATION TREATMENT DELIVERY: ICD-10-PCS | Mod: ,,, | Performed by: RADIOLOGY

## 2020-03-05 PROCEDURE — 77387 GUIDANCE FOR RADJ TX DLVR: CPT | Mod: ,,, | Performed by: RADIOLOGY

## 2020-03-05 PROCEDURE — 77385 HC IMRT, SIMPLE: CPT | Performed by: RADIOLOGY

## 2020-03-05 NOTE — TELEPHONE ENCOUNTER
Mr. Li No Showed for his physical therapy appointment today, 3/5/20. Mr. Li was called and I left a message on his voice mail forhim to call to schedule another appointment.    Vannessa Kasper, PT

## 2020-03-06 PROCEDURE — 77387 PR GUIDANCE FOR RADIATION TREATMENT DELIVERY: ICD-10-PCS | Mod: ,,, | Performed by: RADIOLOGY

## 2020-03-06 PROCEDURE — 77387 GUIDANCE FOR RADJ TX DLVR: CPT | Mod: ,,, | Performed by: RADIOLOGY

## 2020-03-06 PROCEDURE — 77385 HC IMRT, SIMPLE: CPT | Performed by: RADIOLOGY

## 2020-03-09 PROCEDURE — 77385 HC IMRT, SIMPLE: CPT | Performed by: RADIOLOGY

## 2020-03-10 PROCEDURE — 77387 GUIDANCE FOR RADJ TX DLVR: CPT | Mod: ,,, | Performed by: RADIOLOGY

## 2020-03-10 PROCEDURE — 77336 RADIATION PHYSICS CONSULT: CPT | Performed by: RADIOLOGY

## 2020-03-10 PROCEDURE — 77387 PR GUIDANCE FOR RADIATION TREATMENT DELIVERY: ICD-10-PCS | Mod: ,,, | Performed by: RADIOLOGY

## 2020-03-10 PROCEDURE — 77385 HC IMRT, SIMPLE: CPT | Performed by: RADIOLOGY

## 2020-03-11 PROCEDURE — 77387 GUIDANCE FOR RADJ TX DLVR: CPT | Mod: ,,, | Performed by: RADIOLOGY

## 2020-03-11 PROCEDURE — 77387 PR GUIDANCE FOR RADIATION TREATMENT DELIVERY: ICD-10-PCS | Mod: ,,, | Performed by: RADIOLOGY

## 2020-03-11 PROCEDURE — 77385 HC IMRT, SIMPLE: CPT | Performed by: RADIOLOGY

## 2020-03-12 ENCOUNTER — DOCUMENTATION ONLY (OUTPATIENT)
Dept: RADIATION ONCOLOGY | Facility: CLINIC | Age: 45
End: 2020-03-12

## 2020-03-12 PROCEDURE — 77387 PR GUIDANCE FOR RADIATION TREATMENT DELIVERY: ICD-10-PCS | Mod: ,,, | Performed by: RADIOLOGY

## 2020-03-12 PROCEDURE — 77385 HC IMRT, SIMPLE: CPT | Performed by: RADIOLOGY

## 2020-03-12 PROCEDURE — 77387 GUIDANCE FOR RADJ TX DLVR: CPT | Mod: ,,, | Performed by: RADIOLOGY

## 2020-03-13 PROCEDURE — 77334 RADIATION TREATMENT AID(S): CPT | Mod: 26,,, | Performed by: RADIOLOGY

## 2020-03-13 PROCEDURE — 77387 GUIDANCE FOR RADJ TX DLVR: CPT | Mod: ,,, | Performed by: RADIOLOGY

## 2020-03-13 PROCEDURE — 77334 RADIATION TREATMENT AID(S): CPT | Mod: TC | Performed by: RADIOLOGY

## 2020-03-13 PROCEDURE — 77417 THER RADIOLOGY PORT IMAGE(S): CPT | Performed by: RADIOLOGY

## 2020-03-13 PROCEDURE — 77321 SPECIAL TELETX PORT PLAN: CPT | Mod: 26,,, | Performed by: RADIOLOGY

## 2020-03-13 PROCEDURE — 77321 PR  TELETHER ISO-PORT PLAN: ICD-10-PCS | Mod: 26,,, | Performed by: RADIOLOGY

## 2020-03-13 PROCEDURE — 77387 PR GUIDANCE FOR RADIATION TREATMENT DELIVERY: ICD-10-PCS | Mod: ,,, | Performed by: RADIOLOGY

## 2020-03-13 PROCEDURE — 77385 HC IMRT, SIMPLE: CPT | Performed by: RADIOLOGY

## 2020-03-13 PROCEDURE — 77321 SPECIAL TELETX PORT PLAN: CPT | Mod: TC | Performed by: RADIOLOGY

## 2020-03-13 PROCEDURE — 77334 PR  RADN TREATMENT AID(S) COMPLX: ICD-10-PCS | Mod: 26,,, | Performed by: RADIOLOGY

## 2020-03-16 PROCEDURE — 77334 PR  RADN TREATMENT AID(S) COMPLX: ICD-10-PCS | Mod: 26,,, | Performed by: RADIOLOGY

## 2020-03-16 PROCEDURE — 77321 PR  TELETHER ISO-PORT PLAN: ICD-10-PCS | Mod: 26,,, | Performed by: RADIOLOGY

## 2020-03-16 PROCEDURE — 77321 SPECIAL TELETX PORT PLAN: CPT | Mod: TC | Performed by: RADIOLOGY

## 2020-03-16 PROCEDURE — 77412 RADIATION TX DELIVERY LVL 3: CPT | Performed by: RADIOLOGY

## 2020-03-16 PROCEDURE — 77321 SPECIAL TELETX PORT PLAN: CPT | Mod: 26,,, | Performed by: RADIOLOGY

## 2020-03-16 PROCEDURE — 77334 RADIATION TREATMENT AID(S): CPT | Mod: TC | Performed by: RADIOLOGY

## 2020-03-16 PROCEDURE — 77334 RADIATION TREATMENT AID(S): CPT | Mod: 26,,, | Performed by: RADIOLOGY

## 2020-03-17 PROCEDURE — 77336 RADIATION PHYSICS CONSULT: CPT | Performed by: RADIOLOGY

## 2020-03-17 PROCEDURE — 77412 RADIATION TX DELIVERY LVL 3: CPT | Performed by: RADIOLOGY

## 2020-03-17 NOTE — PROGRESS NOTES
Diabetes Education  Author: Heidy Segal RD, CDE  Date: 2/21/2020    Diabetes Care Management Summary  Diabetes Education Record Progress: Post Program   (3 mo f/u)    Current Diabetes Risk Level: Low     Last A1c:   Lab Results   Component Value Date    HGBA1C 6.2 (H) 01/02/2020     Last Visit with Diabetes Educator: 11/4/2019    Diabetes Type : Type II  Diabetes Diagnosis: 0-1 year  (dx 2019)      Current Treatment: Injectable, Oral Medication  metformin 1000 mg BID;  Trulicity 1.5 mg            Reviewed Problem List with Patient: Yes    Health Maintenance was reviewed today with patient. Discussed with patient importance of routine eye exams, foot exams/foot care, blood work (i.e.: A1c, microalbumin, and lipid), dental visits, yearly flu vaccine, and pneumonia vaccine as indicated by PCP. Patient verbalized understanding.     Health Maintenance Topics with due status: Not Due       Topic Last Completion Date    TETANUS VACCINE 10/24/2017    Foot Exam 11/06/2019    Eye Exam 11/11/2019    Hemoglobin A1c 01/02/2020    Low Dose Statin 02/27/2020     Health Maintenance Due   Topic Date Due    Pneumococcal Vaccine (Highest Risk) (1 of 3 - PCV13) 10/17/1996    Lipid Panel  06/15/2019       Nutrition  Meal Planning: (trying to limit carbs)  What type of beverages do you drink?: diet soda/tea, water  (coke zero; occasional lemonade)  Meal Plan 24 Hour Recall - Breakfast: (grits, eggs, bologna, OR oatmeal)  Meal Plan 24 Hour Recall - Lunch: (sandwich, chips, OR leftovers)  Meal Plan 24 Hour Recall - Dinner: (xena steak, potatoes)    Monitoring   Self Monitoring : (SMBG 3-4 times daily; range: )  Blood Glucose Logs: Yes  Do you use a personal continuous glucose monitor?: No  In the last month, how often have you had a low blood sugar reaction?: never    Exercise   Exercise Type: use exercise equipment  Frequency: Daily  Duration: > 1 hour    Social History  Preferred Learning Method: Face to Face,  Demonstration, Hands On, Reading Materials  Primary Support: Self, Spouse  Smoking Status: Ex Smoker  Alcohol Use: Weekly  Barriers to Change: None  Learning Challenges : None  Readiness to Learn : Acceptance  Cultural Influences: No        Diabetes Education Assessment/Progress  Diabetes Disease Process (diabetes disease process and treatment options): Discussion, Instructed, Individual Session, Written Materials Provided, Comprehends Key Points  Nutrition (Incorporating nutritional management into one's lifestyle): Discussion, Instructed, Individual Session, Written Materials Provided, Comprehends Key Points  Physical Activity (incorporating physical activity into one's lifestyle): Discussion, Instructed, Individual Session, Written Materials Provided, Comprehends Key Points  Medications (states correct name, dose, onset, peak, duration, side effects & timing of meds): Discussion, Instructed, Individual Session, Written Materials Provided, Comprehends Key Points  Monitoring (monitoring blood glucose/other parameters & using results): Discussion, Instructed, Individual Session, Written Materials Provided, Comprehends Key Points  Acute Complications (preventing, detecting, and treating acute complications): Discussion, Instructed, Individual Session, Written Materials Provided, Comprehends Key Points  Chronic Complications (preventing, detecting, and treating chronic complications): Discussion, Instructed, Individual Session, Written Materials Provided, Comprehends Key Points  Clinical (diabetes, other pertinent medical history, and relevant comorbidities reviewed during visit): Discussion, Individual Session  Cognitive (knowledge of self-management skills, functional health literacy): Discussion, Individual Session  Psychosocial (emotional response to diabetes): Discussion, Individual Session(DC;IS;)  Diabetes Distress and Support Systems: Discussion, Individual Session  Behavioral (readiness for change, lifestyle  practices, self-care behaviors): Discussion, Individual Session            Goals  Patient has selected/evaluated goals during today's session: Yes, selected    Healthy Eating: % Met  (Limit carbs to <75 gm per meal)  Met Percentage : 100%  Start Date: 11/04/19  Target Date: 02/21/20      Diabetes Self-Management Support Plan  Diabetes Learning: DM apps  Exercise/Nutrition: join a gym, use a local track  Stress Management: family  Review Status: Patient has selected and agrees to support plan., Patient was provided Diabetes Self-Management Support Plan document that includes support options.      Diabetes Care Plan/Intervention  Education Plan/Intervention: (annual f/u)        Diabetes Meal Plan  Restrictions: Restricted Carbohydrate, Low Fat, Low Sodium  Carbohydrate Per Meal: 60-75g, 45-60g  Carbohydrate Per Snack : 7-15g, 15-20g        Today's Self-Management Care Plan was developed with the patient's input and is based on barriers identified during today's assessment.    The long and short-term goals in the care plan were written with the patient/caregiver's input. The patient has agreed to work toward these goals to improve his overall diabetes control.      The patient received a copy of today's self-management plan and verbalized understanding of the care plan, goals, and all of today's instructions.      The patient was encouraged to communicate with his physician and care team regarding his condition(s) and treatment.  I provided the patient with my contact information today and encouraged him to contact me via phone or patient portal as needed.         Education Units of Time   Time Spent: 60 min

## 2020-03-18 PROCEDURE — 77412 RADIATION TX DELIVERY LVL 3: CPT | Performed by: RADIOLOGY

## 2020-03-20 ENCOUNTER — OFFICE VISIT (OUTPATIENT)
Dept: HEMATOLOGY/ONCOLOGY | Facility: CLINIC | Age: 45
End: 2020-03-20

## 2020-03-20 ENCOUNTER — TELEPHONE (OUTPATIENT)
Dept: PHARMACY | Facility: CLINIC | Age: 45
End: 2020-03-20

## 2020-03-20 DIAGNOSIS — T45.1X5A CHEMOTHERAPY-INDUCED NEUROPATHY: ICD-10-CM

## 2020-03-20 DIAGNOSIS — F43.23 ADJUSTMENT DISORDER WITH MIXED ANXIETY AND DEPRESSED MOOD: ICD-10-CM

## 2020-03-20 DIAGNOSIS — Z17.1 MALIGNANT NEOPLASM INVOLVING BOTH NIPPLE AND AREOLA OF RIGHT BREAST IN MALE, ESTROGEN RECEPTOR NEGATIVE: Primary | ICD-10-CM

## 2020-03-20 DIAGNOSIS — G62.0 CHEMOTHERAPY-INDUCED NEUROPATHY: ICD-10-CM

## 2020-03-20 DIAGNOSIS — C50.021 MALIGNANT NEOPLASM INVOLVING BOTH NIPPLE AND AREOLA OF RIGHT BREAST IN MALE, ESTROGEN RECEPTOR NEGATIVE: Primary | ICD-10-CM

## 2020-03-20 DIAGNOSIS — D64.81 ANTINEOPLASTIC CHEMOTHERAPY INDUCED ANEMIA: ICD-10-CM

## 2020-03-20 DIAGNOSIS — I89.0 LYMPHEDEMA OF RIGHT ARM: ICD-10-CM

## 2020-03-20 DIAGNOSIS — D72.819 LEUKOPENIA, UNSPECIFIED TYPE: ICD-10-CM

## 2020-03-20 DIAGNOSIS — I10 ESSENTIAL HYPERTENSION: ICD-10-CM

## 2020-03-20 DIAGNOSIS — D50.9 MICROCYTIC ANEMIA: ICD-10-CM

## 2020-03-20 DIAGNOSIS — T45.1X5A ANTINEOPLASTIC CHEMOTHERAPY INDUCED ANEMIA: ICD-10-CM

## 2020-03-20 PROCEDURE — 99214 PR OFFICE/OUTPT VISIT, EST, LEVL IV, 30-39 MIN: ICD-10-PCS | Mod: 95,,, | Performed by: INTERNAL MEDICINE

## 2020-03-20 PROCEDURE — 99214 OFFICE O/P EST MOD 30 MIN: CPT | Mod: 95,,, | Performed by: INTERNAL MEDICINE

## 2020-03-20 PROCEDURE — 77412 RADIATION TX DELIVERY LVL 3: CPT | Performed by: RADIOLOGY

## 2020-03-20 RX ORDER — CAPECITABINE 500 MG/1
1000 TABLET, FILM COATED ORAL 2 TIMES DAILY
Qty: 168 TABLET | Refills: 6 | Status: CANCELLED | OUTPATIENT
Start: 2020-03-20

## 2020-03-20 RX ORDER — ONDANSETRON HYDROCHLORIDE 8 MG/1
8 TABLET, FILM COATED ORAL EVERY 8 HOURS PRN
Qty: 30 TABLET | Refills: 2 | Status: SHIPPED | OUTPATIENT
Start: 2020-03-20 | End: 2021-09-16 | Stop reason: CLARIF

## 2020-03-20 RX ORDER — CAPECITABINE 500 MG/1
1000 TABLET, FILM COATED ORAL 2 TIMES DAILY
Qty: 168 TABLET | Refills: 6 | Status: SHIPPED | OUTPATIENT
Start: 2020-03-20 | End: 2020-06-10 | Stop reason: SDUPTHER

## 2020-03-20 NOTE — PROGRESS NOTES
History:     Reason For Follow Up:   1. Stage IB ([id number]) triple negative invasive ductal carcinoma with lobular features of right breast, retroareolar, Grade 2, Ki67 80%, diagnosed 5/2019    HPI:   Paul Li JrEstefany presents for follow up of breast cancer.  Radiation therapy is almost complete - will end tomorrow - he's getting boost now. Reports skin changes and pain from the skin changes.    Neuropathy stable.   Afebrile. Fatigue improving     Onc History:     Malignant neoplasm involving both nipple and areola of right breast in male, estrogen receptor negative    5/1/2019 Imaging Significant Findings     Mammogram and US  Impression:  Right  Mass: Right breast 14 mm mass at the retroareolar anterior position. Assessment: 4 - Suspicious finding. Biopsy is recommended.      Left  There is no mammographic or sonographic evidence of malignancy.          Recommendation:  Biopsy is recommended.      5/2/2019 Biopsy     BREAST, RIGHT, RETROAREOLAR MASS, ULTRASOUND-GUIDED BIOPSY:  Invasive ductal carcinoma with lobular features.  Size of invasive carcinoma: 5 MM in greatest linear dimension within a single      5/2/2019 Breast Tumor Markers     Estrogen: Negative  Progesterone: Negative  HER2: Negative  Ki67: 80      5/2/2019 Cancer Staged     Staging form: Breast, AJCC 8th Edition  - Clinical stage from 5/17/2019: Stage IB (cT1c, cN0, cM0, G2, ER-, NM-, HER2-) - Signed by Richa Bahena MD on 5/17/2019 5/27/2019 - 7/21/2019 Chemotherapy     Treatment Summary   Plan Name: OP BREAST DOSE-DENSE AC - DOXORUBICIN CYCLOPHOSPHAMIDE Q2W  Treatment Goal: Curative  Status: Inactive  Start Date: 5/27/2019  End Date: 7/9/2019  Provider: Richa Bahena MD  Chemotherapy: DOXOrubicin chemo injection 186 mg, 60 mg/m2 = 186 mg, Intravenous, Clinic/HOD 1 time, 4 of 4 cycles  Administration: 186 mg (5/27/2019), 186 mg (6/10/2019), 186 mg (6/24/2019), 186 mg (7/8/2019)  cyclophosphamide (CYTOXAN) 600 mg/m2 = 1,865 mg in  sodium chloride 0.9% 250 mL chemo infusion, 600 mg/m2 = 1,865 mg, Intravenous, Clinic/HOD 1 time, 4 of 4 cycles  Administration: 1,865 mg (5/27/2019), 1,865 mg (6/10/2019), 1,865 mg (6/24/2019), 1,865 mg (7/8/2019)      7/22/2019 - 10/15/2019 Chemotherapy     Treatment Summary   Plan Name: OP PACLITAXEL QW  Treatment Goal: Curative  Status: Inactive  Start Date: 7/24/2019  End Date: 10/8/2019  Provider: Richa Bahena MD  Chemotherapy: PACLitaxel (TAXOL) 80 mg/m2 = 246 mg in sodium chloride 0.9% 250 mL chemo infusion, 80 mg/m2 = 246 mg, Intravenous, Clinic/HOD 1 time, 12 of 12 cycles  Dose modification: 60 mg/m2 (original dose 80 mg/m2, Cycle 3), 55 mg/m2 (original dose 80 mg/m2, Cycle 7), 60 mg/m2 (original dose 80 mg/m2, Cycle 7), 50 mg/m2 (original dose 80 mg/m2, Cycle 9), 50 mg/m2 (original dose 80 mg/m2, Cycle 10)  Administration: 246 mg (7/24/2019), 246 mg (7/31/2019), 186 mg (8/7/2019), 186 mg (8/14/2019), 186 mg (8/21/2019), 186 mg (8/28/2019), 186 mg (9/4/2019), 174 mg (9/11/2019), 156 mg (9/18/2019), 156 mg (9/25/2019), 156 mg (10/1/2019), 156 mg (10/8/2019)      11/22/2019 Breast Surgery     On 11/22/19 Dr whyte performed a right breast mastectomy with SLN biopsy with pathology showing grade 2, 6 mm IDC with extensive treatment effect, no LVI with 1 LN positive 4 mm, negative margins. The on 12/17/19, Dr Whyte performed right axillary dissection with pathology still pending.      11/22/2019 Cancer Staged     ypT1b N1      12/17/2019 Breast Surgery     Surgery: Dr Shima Whyte, [phone] performed right ALND with pathology showing 14 negative lymph nodes.           1/14/2020 Tumor Conference        Post mastectomy radiation therapy: Xeloda, then possible Keytruda trial .      2/28/2020 -  Chemotherapy     Treatment Summary   Plan Name: OP CAPECITABINE 1250MG/M2 BID Q3W  Treatment Goal: Curative  Status: Active  Start Date: 2/28/2020 (Planned)  End Date: 2/28/2020 (Planned)  Provider: Richa FRAUSTO  MD Shruti  Chemotherapy: [No matching medication found in this treatment plan]           Medications    Current Outpatient Medications:     amLODIPine (NORVASC) 10 MG tablet, TAKE 1 TABLET (10 MG) BY MOUTH EVERY DAY (Patient taking differently: Take 10 mg by mouth every morning. ), Disp: 30 tablet, Rfl: 10    atorvastatin (LIPITOR) 20 MG tablet, Take 1 tablet (20 mg total) by mouth once daily., Disp: 90 tablet, Rfl: 3    blood sugar diagnostic Strp, To check BG 4 times daily, to use with insurance preferred meter, Disp: 400 each, Rfl: 3    blood-glucose meter kit, Use as instructed, Disp: 1 each, Rfl: 0    dulaglutide (TRULICITY) 1.5 mg/0.5 mL PnIj, Inject 1.5 mg into the skin once a week. Trulicity 0.75mg weekly for 4 weeks & then increase to 1.5 mg weekly indefinitely., Disp: 6 mL, Rfl: 3    empagliflozin (JARDIANCE) 25 mg Tab, Take 1 tablet (25 mg) by mouth once daily., Disp: 30 tablet, Rfl: 3    flash glucose scanning reader (FREESTYLE MARIANO 14 DAY READER) Misc, Check glucose continuousl;y (Patient not taking: Reported on 2/27/2020), Disp: 1 each, Rfl: 0    flash glucose sensor (FREESTYLE MARIANO 14 DAY SENSOR) Kit, Check glucose continously (Patient not taking: Reported on 2/27/2020), Disp: 1 kit, Rfl: 11    gabapentin (NEURONTIN) 300 MG capsule, Take 1 capsule (300 mg total) by mouth 3 (three) times daily., Disp: 90 capsule, Rfl: 11    hydroCHLOROthiazide (HYDRODIURIL) 25 MG tablet, TAKE 1 TABLET BY MOUTH ONCE DAILY, Disp: 90 tablet, Rfl: 3    insulin aspart U-100 (NOVOLOG) 100 unit/mL (3 mL) InPn pen, Inject 24 Units into the skin 3 (three) times daily. (Patient taking differently: Inject 16 Units into the skin 3 (three) times daily. ), Disp: 64.8 mL, Rfl: 3    insulin detemir U-100 (LEVEMIR FLEXTOUCH) 100 unit/mL (3 mL) SubQ InPn pen, Inject 40 Units into the skin 2 (two) times daily. (Patient taking differently: Inject 36 Units into the skin nightly. ), Disp: 24 mL, Rfl: 11    lancets 33 gauge  "Misc, Use 2 (two) times daily with meals., Disp: 100 each, Rfl: 11    lancets 33 gauge Misc, To check BG 4 times daily, to use with insurance preferred meter, Disp: 200 each, Rfl: 11    lancing device Misc, 1 Device by Misc.(Non-Drug; Combo Route) route 2 (two) times daily with meals., Disp: 1 each, Rfl: 0    lidocaine-prilocaine (EMLA) cream, Apply topically to affected area 45 minutes before port access, Disp: 30 g, Rfl: 1    losartan (COZAAR) 100 MG tablet, TAKE 1 TABLET BY MOUTH ONCE DAILY, Disp: 90 tablet, Rfl: 3    metFORMIN (GLUCOPHAGE) 500 MG tablet, Take 2 tablets (1,000 mg total) by mouth 2 (two) times daily with meals., Disp: 90 tablet, Rfl: 3    ondansetron (ZOFRAN-ODT) 8 MG TbDL, Take 1 tablet (8 mg total) by mouth every 12 (twelve) hours as needed., Disp: 20 tablet, Rfl: 1    oxyCODONE-acetaminophen (PERCOCET) 5-325 mg per tablet, Take 1 tablet by mouth every 4 (four) hours as needed for Pain., Disp: 40 tablet, Rfl: 0    oxyCODONE-acetaminophen (PERCOCET) 5-325 mg per tablet, Take 1 tablet by mouth every 6 (six) hours as needed., Disp: 10 tablet, Rfl: 0    pen needle, diabetic (BD ULTRA-FINE TANESHA PEN NEEDLE) 32 gauge x 5/32" Ndle, Use as directed with novolog and levemir, Disp: 100 each, Rfl: 5    senna (SENOKOT) 8.6 mg tablet, Take 1 tablet by mouth once daily., Disp: , Rfl:     tadalafil (CIALIS) 5 MG tablet, Take 2 tablets (10 mg total) by mouth daily as needed for Erectile Dysfunction., Disp: 20 tablet, Rfl: 1  No current facility-administered medications for this visit.     Facility-Administered Medications Ordered in Other Visits:     heparin, porcine (PF) 100 unit/mL injection flush 500 Units, 500 Units, Intravenous, 1 time in Clinic/HOD, Richa Bahena MD    sodium chloride 0.9% flush 10 mL, 10 mL, Intravenous, 1 time in Clinic/HOD, Richa Bahena MD  Allergies  Review of patient's allergies indicates:  No Known Allergies  Review of Systems  Review of Systems   Constitutional: " Positive for fatigue. Negative for activity change, appetite change, chills, fever and unexpected weight change.   HENT: Negative for congestion, hearing loss, nosebleeds, sinus pressure, sinus pain and trouble swallowing.    Eyes: Negative for pain, discharge, itching and visual disturbance.   Respiratory: Negative for cough, chest tightness and shortness of breath.    Cardiovascular: Negative for chest pain, palpitations and leg swelling.   Gastrointestinal: Negative for abdominal distention, abdominal pain, blood in stool, constipation, diarrhea, nausea, rectal pain and vomiting.   Endocrine: Negative for heat intolerance and polydipsia.   Genitourinary: Negative for difficulty urinating, dysuria, flank pain, frequency, hematuria and urgency.   Musculoskeletal: Negative for arthralgias, back pain and neck pain.   Skin: Negative for color change, pallor and rash.        Radiation skin changes   Neurological: Negative for dizziness, numbness and headaches.   Hematological: Negative for adenopathy. Does not bruise/bleed easily.   Psychiatric/Behavioral: Negative for confusion, decreased concentration, dysphoric mood and sleep disturbance. The patient is not nervous/anxious.         Objective     Objective:     No exam since televisit.    Labs and Imaging  Results for orders placed or performed in visit on 02/14/20   CBC Oncology   Result Value Ref Range    WBC 2.97 (L) 3.90 - 12.70 K/uL    RBC 5.05 4.60 - 6.20 M/uL    Hemoglobin 11.7 (L) 14.0 - 18.0 g/dL    Hematocrit 38.8 (L) 40.0 - 54.0 %    Mean Corpuscular Volume 77 (L) 82 - 98 fL    Mean Corpuscular Hemoglobin 23.2 (L) 27.0 - 31.0 pg    Mean Corpuscular Hemoglobin Conc 30.2 (L) 32.0 - 36.0 g/dL    RDW 16.2 (H) 11.5 - 14.5 %    Platelets 188 150 - 350 K/uL    MPV 9.8 9.2 - 12.9 fL    Gran # (ANC) 1.7 (L) 1.8 - 7.7 K/uL    Immature Grans (Abs) 0.01 0.00 - 0.04 K/uL   Comprehensive metabolic panel   Result Value Ref Range    Sodium 137 136 - 145 mmol/L     Potassium 4.0 3.5 - 5.1 mmol/L    Chloride 104 95 - 110 mmol/L    CO2 26 23 - 29 mmol/L    Glucose 94 70 - 110 mg/dL    BUN, Bld 9 6 - 20 mg/dL    Creatinine 1.2 0.5 - 1.4 mg/dL    Calcium 9.1 8.7 - 10.5 mg/dL    Total Protein 7.8 6.0 - 8.4 g/dL    Albumin 3.9 3.5 - 5.2 g/dL    Total Bilirubin 0.6 0.1 - 1.0 mg/dL    Alkaline Phosphatase 89 55 - 135 U/L    AST 14 10 - 40 U/L    ALT 17 10 - 44 U/L    Anion Gap 7 (L) 8 - 16 mmol/L    eGFR if African American >60.0 >60 mL/min/1.73 m^2    eGFR if non African American >60.0 >60 mL/min/1.73 m^2       Assessment     Assessment:   1. Stage IB ([id number]) invasive ductal carcinoma with lobular features of right breast, retroareolar, ER neg, CA neg, Her2 neg, Grade 2, Ki67 80%, diagnosed 5/2019   * Genetics negative   * 5/27/19 - 10/8/19 completed neoadjuvant ddAC followed by weekly Taxol.    * On 11/22/19 Dr whyte performed a right breast mastectomy with SLN biopsy with pathology showing grade 2, 6 mm IDC with extensive treatment effect, no LVI with 1 LN positive 4 mm, negative margins. The on 12/17/19, Dr Whyte performed right axillary dissection with pathology still pending.   * 2/3/2020 - 3/12/2020 completed RT   * I have recommended adjuvant Xeloda for residual triple negative breast cancer after neoadjuvant therapy. I am recommending Xeloda 1000 mg/m2 bid days 1-14 every 21 days for 6-8 cycles depending on tolerance. Side effects of Xeloda discussed with patient including but not limited to cytopenias, renal abnormalities, nausea, vomiting, diarrhea, constipation, hand foot syndrome, among others. Patient asked questions which I answered.       2. Microcytic anemia   * Has anemia as baseline but worsened with chemotherapy   * improving.    3. Adjustment disorder/depression.    * Dr Nunez; On effexor as of late 12/2019    4. Leukopenia   * Repeat labs in 3 wks.     5. HTN and Diabetes per PCP; last hemoglobin A1c     6. Insomnia   * Tried melatonin and Restoril  without success    7. Chemo neuropathy   * Gabapentin 300 mg tid.     8. Lymphedema   * Working with PT  Plan:     1. Xeloda 1000 mg/m2 days 1-14 every 21 days. Will plan to start in 3 weeks as long as he's healed from radiation therapy. Will check cbc, cmp prior to start. Televisit.   2.  eval after Xeloda  3. Continue gabapentin 300 mg tid.   4. Diabetes management  5. Port flush.      Future Appointments   Date Time Provider Department Center   3/20/2020 10:00 AM Richa Bahena MD [facility] HEM ONC [place]   3/31/2020 10:00 AM LAB, APPOINTMENT [facility]   4/7/2020  2:00 PM Octavia Van NP [facility] MICHELLE [facility]   6/15/2020 11:15 AM Shima Whyte MD [facility]   7/6/2020 10:00 AM [facility]     The patient location is: Home  The chief complaint leading to consultation is: breast cancer  Visit type: Virtual visit with audio  Total time spent with patient: 15 minutes but additional 20 minutes spent coordinating care with Xeloda  Each patient to whom he or she provides medical services by telemedicine is:  (1) informed of the relationship between the physician and patient and the respective role of any other health care provider with respect to management of the patient; and (2) notified that he or she may decline to receive medical services by telemedicine and may withdraw from such care at any time.

## 2020-03-20 NOTE — TELEPHONE ENCOUNTER
Notified the patient we received the prescription for Capecitabine. He confirmed he does NOT have insurance right now. We will look into assistance for him. Patient voiced understanding.

## 2020-03-23 ENCOUNTER — DOCUMENTATION ONLY (OUTPATIENT)
Dept: RADIATION ONCOLOGY | Facility: CLINIC | Age: 45
End: 2020-03-23

## 2020-03-23 PROCEDURE — 77412 RADIATION TX DELIVERY LVL 3: CPT | Performed by: RADIOLOGY

## 2020-03-23 PROCEDURE — 77336 RADIATION PHYSICS CONSULT: CPT | Performed by: RADIOLOGY

## 2020-03-27 NOTE — PLAN OF CARE
Outpatient radiation to the chest wall completed on 3/23/20  skin care reviewed and supplies given

## 2020-04-01 ENCOUNTER — DOCUMENTATION ONLY (OUTPATIENT)
Dept: HEMATOLOGY/ONCOLOGY | Facility: CLINIC | Age: 45
End: 2020-04-01

## 2020-04-01 NOTE — PROGRESS NOTES
SIDDHARTH made several calls to specialty pharm and emailed with provider to gather information on the number of doses and cost of medication (Xeloda).  The pt will begin taking in 3 weeks and it will cost a total of $9200.  Pt is not eligible for grants unless he has insurance and the  of this drug does not provide assistance.  CHRISTOPHER will provide $250 toward the cost of the medication.  SIDDHARTH consulted with Amy Garrison--[facility].  She explained that pt will be eligible for Medicare 2 years after he was approved for disability.  TC to pt revealed he will be eligible in 2021--he was approved for disability about a year ago.  Ms. Garrison called pt and recommended that he call Medicaid [employer] at [phone] to apply for the Medicaid spend down program.  Pt told her he would call tomorrow. SIDDHARTH and Ms. Garrison will follow this closely to guide pt through process as needed.

## 2020-04-01 NOTE — PROGRESS NOTES
BRANDEN received email from pt's nurse (with Clarissa Cunningham--financial assistance) included in email.  Clarissa noted that financial assistance cannot help with co-pays for oral chemo.  BRANDEN called specialty pharmacy ([facility]) said the pt is not eligible for gilbert help if he doesn't have ins.  BRANDEN called pt.  He said Medicaid dropped him when he got his disability because now he makes too much money so he is currently uninsured.  Branden will investigate assistance options through [employer] and follow-up with pt and providers.

## 2020-04-02 ENCOUNTER — DOCUMENTATION ONLY (OUTPATIENT)
Dept: HEMATOLOGY/ONCOLOGY | Facility: CLINIC | Age: 45
End: 2020-04-02

## 2020-04-02 ENCOUNTER — TELEPHONE (OUTPATIENT)
Dept: PULMONOLOGY | Facility: CLINIC | Age: 45
End: 2020-04-02

## 2020-04-02 NOTE — PROGRESS NOTES
This SW followed up with pt today regarding applying for Medicaid over the phone.  He said he would call Medicaid immediately following SW call.   MED

## 2020-04-06 ENCOUNTER — DOCUMENTATION ONLY (OUTPATIENT)
Dept: HEMATOLOGY/ONCOLOGY | Facility: CLINIC | Age: 45
End: 2020-04-06

## 2020-04-06 ENCOUNTER — PATIENT MESSAGE (OUTPATIENT)
Dept: REHABILITATION | Facility: HOSPITAL | Age: 45
End: 2020-04-06

## 2020-04-06 NOTE — PROGRESS NOTES
SW received TC from pt.  He was denied Medicaid and forwarded denial letter to SW.  Discussed next steps to obtaining financial assistance with pt. SIDDHARTH will forward letter to Zaid Wyatt at pt's request and he will call Xochitl Perez --[phone] to discuss Rx assistance for upcoming oral chemo.

## 2020-04-07 ENCOUNTER — OFFICE VISIT (OUTPATIENT)
Dept: ENDOCRINOLOGY | Facility: CLINIC | Age: 45
End: 2020-04-07

## 2020-04-07 ENCOUNTER — DOCUMENTATION ONLY (OUTPATIENT)
Dept: REHABILITATION | Facility: HOSPITAL | Age: 45
End: 2020-04-07

## 2020-04-07 DIAGNOSIS — Z17.1 MALIGNANT NEOPLASM INVOLVING BOTH NIPPLE AND AREOLA OF RIGHT BREAST IN MALE, ESTROGEN RECEPTOR NEGATIVE: ICD-10-CM

## 2020-04-07 DIAGNOSIS — C50.021 MALIGNANT NEOPLASM INVOLVING BOTH NIPPLE AND AREOLA OF RIGHT BREAST IN MALE, ESTROGEN RECEPTOR NEGATIVE: ICD-10-CM

## 2020-04-07 DIAGNOSIS — T45.1X5A CHEMOTHERAPY-INDUCED NEUROPATHY: ICD-10-CM

## 2020-04-07 DIAGNOSIS — I89.0 LYMPHEDEMA OF RIGHT ARM: ICD-10-CM

## 2020-04-07 DIAGNOSIS — E11.65 UNCONTROLLED TYPE 2 DIABETES MELLITUS WITH HYPERGLYCEMIA: Primary | ICD-10-CM

## 2020-04-07 DIAGNOSIS — G62.0 CHEMOTHERAPY-INDUCED NEUROPATHY: ICD-10-CM

## 2020-04-07 DIAGNOSIS — E11.9 TYPE 2 DIABETES MELLITUS WITHOUT COMPLICATION, WITHOUT LONG-TERM CURRENT USE OF INSULIN: ICD-10-CM

## 2020-04-07 DIAGNOSIS — I10 ESSENTIAL HYPERTENSION: ICD-10-CM

## 2020-04-07 DIAGNOSIS — E66.01 CLASS 3 SEVERE OBESITY DUE TO EXCESS CALORIES WITH SERIOUS COMORBIDITY AND BODY MASS INDEX (BMI) OF 50.0 TO 59.9 IN ADULT: ICD-10-CM

## 2020-04-07 PROBLEM — E09.9 STEROID-INDUCED DIABETES MELLITUS: Status: RESOLVED | Noted: 2019-10-10 | Resolved: 2020-04-07

## 2020-04-07 PROBLEM — T38.0X5A STEROID-INDUCED DIABETES MELLITUS: Status: RESOLVED | Noted: 2019-10-10 | Resolved: 2020-04-07

## 2020-04-07 PROCEDURE — 99214 OFFICE O/P EST MOD 30 MIN: CPT | Mod: 95,,, | Performed by: INTERNAL MEDICINE

## 2020-04-07 PROCEDURE — 99214 PR OFFICE/OUTPT VISIT, EST, LEVL IV, 30-39 MIN: ICD-10-PCS | Mod: 95,,, | Performed by: INTERNAL MEDICINE

## 2020-04-07 NOTE — PROGRESS NOTES
Discharge Note  Mr. Li was seen in outpatient physical therapy for an initial evaluation on 1/9/20 for RUE cording and lymphedema due to right breast cancer. Mr. Li attended 4 follow up visits and has self discharged as he has not returned for further physical therapy. POC has ended and patient is discharged from physical therapy.        Vannessa Kasper, PT

## 2020-04-07 NOTE — ASSESSMENT & PLAN NOTE
Significant improvement in glucose now that he is no longer on steroids and with lifestyle changes  Given excellent glycemic control it is reasonable to discontinue further medications  Will stop Jardiance  Continue Trulicity 1.5 mg weekly and metformin a 1000 mg b.i.d.  Monitor glucose occasionally and will let me know significant elevation  Labs before he returns to clinic

## 2020-04-07 NOTE — PROGRESS NOTES
Paul Li Jr. is a 44 y.o. male with HTN, HLD, h/o breast ca presenting for follow-up of T2DM    Previously seen by Dr. Elaine and Sonal Eric, new to me      The patient location is: home  The chief complaint leading to consultation is:  Chief Complaint   Patient presents with    Diabetes     Visit type: Virtual visit with synchronous audio and video  Each patient to whom he or she provides medical services by telemedicine is:  (1) informed of the relationship between the physician and patient and the respective role of any other health care provider with respect to management of the patient; and (2) notified that he or she may decline to receive medical services by telemedicine and may withdraw from such care at any time.    History of Present Illness  T2DM  Has right breast Stage 1B triple negative invasive ductal carcinoma s/p chemotherapy. Developed diabetes while receiving chemotherapy and dexamethasone 10/2019. No longer on steroids He has completed chemotherapy. He had 14 lymph nodes excised in Dec 2019 all negative pathology.  Completed radiation in 3/2020.     Known complications: neuropathy due to chemo    Overall doing very well since his last visit.  He is no longer taking insulin and reports excellent control of blood sugars  He has been trying to exercise more regularly although currently being at home with COVID outbreak he is unable to do so    Current Diabetes Regimen:  Trulicity 1.5 mg weekly  Metformin 1000 mg BID  Jardiance 25 mg daily    Prior medications:  lantus   novolog      Recent Hgb A1C:  Lab Results   Component Value Date    HGBA1C 6.2 (H) 01/02/2020       Glucose Monitoring:  Checks daily  96, 100, 114, 98, 123, 101, 94    Hypoglycemic Episodes:denies    Screening / DM Complications:    Nephropathy:  ACEi/ARB: Taking  Lab Results   Component Value Date    MICALBCREAT 10.3 01/02/2020       Last Lipid Panel:  Statin: Taking  Lab Results   Component Value Date    LDLCALC 101.0  06/15/2018       Neuropathy:from chemo  Last foot exam : 11/06/2019  Last eye exam : 11/11/2019;  no laser surgery or DR    B12:  No results found for: [id number]    Diet/Exercise:  14-16 hour fast and trying to eat sensible meals    Exercising as able    Lost about 40 pounds             Current Outpatient Medications:     amLODIPine (NORVASC) 10 MG tablet, TAKE 1 TABLET (10 MG) BY MOUTH EVERY DAY (Patient taking differently: Take 10 mg by mouth every morning. ), Disp: 30 tablet, Rfl: 10    atorvastatin (LIPITOR) 20 MG tablet, Take 1 tablet (20 mg total) by mouth once daily., Disp: 90 tablet, Rfl: 3    blood sugar diagnostic Strp, To check BG 4 times daily, to use with insurance preferred meter, Disp: 400 each, Rfl: 3    blood-glucose meter kit, Use as instructed, Disp: 1 each, Rfl: 0    capecitabine (XELODA) 500 MG Tab, Take 6 tablets (3,000 mg total) by mouth 2 (two) times daily Take as directed days 1-14 of each 21 day cycle. Take with water within 30 minutes after a meal.., Disp: 168 tablet, Rfl: 6    dulaglutide (TRULICITY) 1.5 mg/0.5 mL PnIj, Inject 1.5 mg into the skin once a week. Trulicity 0.75mg weekly for 4 weeks & then increase to 1.5 mg weekly indefinitely., Disp: 6 mL, Rfl: 3    gabapentin (NEURONTIN) 300 MG capsule, Take 1 capsule (300 mg total) by mouth 3 (three) times daily., Disp: 90 capsule, Rfl: 11    hydroCHLOROthiazide (HYDRODIURIL) 25 MG tablet, TAKE 1 TABLET BY MOUTH ONCE DAILY, Disp: 90 tablet, Rfl: 3    lancets 33 gauge Misc, Use 2 (two) times daily with meals., Disp: 100 each, Rfl: 11    lancets 33 gauge Misc, To check BG 4 times daily, to use with insurance preferred meter, Disp: 200 each, Rfl: 11    lancing device Misc, 1 Device by Misc.(Non-Drug; Combo Route) route 2 (two) times daily with meals., Disp: 1 each, Rfl: 0    lidocaine-prilocaine (EMLA) cream, Apply topically to affected area 45 minutes before port access, Disp: 30 g, Rfl: 1    losartan (COZAAR) 100 MG tablet,  "TAKE 1 TABLET BY MOUTH ONCE DAILY, Disp: 90 tablet, Rfl: 3    metFORMIN (GLUCOPHAGE) 500 MG tablet, Take 2 tablets (1,000 mg total) by mouth 2 (two) times daily with meals., Disp: 90 tablet, Rfl: 3    ondansetron (ZOFRAN) 8 MG tablet, Take 1 tablet (8 mg total) by mouth every 8 (eight) hours as needed for Nausea., Disp: 30 tablet, Rfl: 2    ondansetron (ZOFRAN-ODT) 8 MG TbDL, Take 1 tablet (8 mg total) by mouth every 12 (twelve) hours as needed., Disp: 20 tablet, Rfl: 1    oxyCODONE-acetaminophen (PERCOCET) 5-325 mg per tablet, Take 1 tablet by mouth every 4 (four) hours as needed for Pain., Disp: 40 tablet, Rfl: 0    oxyCODONE-acetaminophen (PERCOCET) 5-325 mg per tablet, Take 1 tablet by mouth every 6 (six) hours as needed., Disp: 10 tablet, Rfl: 0    pen needle, diabetic (BD ULTRA-FINE TANESHA PEN NEEDLE) 32 gauge x 5/32" Ndle, Use as directed with novolog and levemir, Disp: 100 each, Rfl: 5    senna (SENOKOT) 8.6 mg tablet, Take 1 tablet by mouth once daily., Disp: , Rfl:     tadalafil (CIALIS) 5 MG tablet, Take 2 tablets (10 mg total) by mouth daily as needed for Erectile Dysfunction., Disp: 20 tablet, Rfl: 1  No current facility-administered medications for this visit.     Facility-Administered Medications Ordered in Other Visits:     heparin, porcine (PF) 100 unit/mL injection flush 500 Units, 500 Units, Intravenous, 1 time in Clinic/HOD, Richa Bahena MD    sodium chloride 0.9% flush 10 mL, 10 mL, Intravenous, 1 time in Clinic/HOD, Richa Bahena MD    Review of Systems   Constitutional: Negative for activity change.   Respiratory: Negative for shortness of breath.    Cardiovascular: Negative for chest pain and leg swelling.   Gastrointestinal: Negative for abdominal pain.   Genitourinary: Negative for dysuria.   Skin: Negative for rash.   Neurological: Negative for headaches.        Neuropathy in feet   Psychiatric/Behavioral: Negative for confusion.       Objective:     Wt Readings from Last 3 " Encounters:   02/27/20 (!) 184.9 kg (407 lb 10.1 oz)   02/14/20 (!) 182.9 kg (403 lb 3.5 oz)   01/20/20 (!) 184.1 kg (405 lb 14.4 oz)         LABS    Chemistry        Component Value Date/Time     02/14/2020 1418    K 4.0 02/14/2020 1418     02/14/2020 1418    CO2 26 02/14/2020 1418    BUN 9 02/14/2020 1418    CREATININE 1.2 02/14/2020 1418    GLU 94 02/14/2020 1418        Component Value Date/Time    CALCIUM 9.1 02/14/2020 1418    ALKPHOS 89 02/14/2020 1418    AST 14 02/14/2020 1418    ALT 17 02/14/2020 1418    BILITOT 0.6 02/14/2020 1418    ESTGFRAFRICA >60.0 02/14/2020 1418    EGFRNONAA >60.0 02/14/2020 1418              Assessment and Plan     Type 2 diabetes mellitus without complication  Significant improvement in glucose now that he is no longer on steroids and with lifestyle changes  Given excellent glycemic control it is reasonable to discontinue further medications  Will stop Jardiance  Continue Trulicity 1.5 mg weekly and metformin a 1000 mg b.i.d.  Monitor glucose occasionally and will let me know significant elevation  Labs before he returns to clinic    Class 3 severe obesity due to excess calories with serious comorbidity and body mass index (BMI) of 50.0 to 59.9 in adult  Discussed dietary modification, encouraged him to continue exercise  Will continue Trulicity as above for potential weight benefit    Chemotherapy-induced neuropathy  Will check B12 with next lab to ensure this is not contributing    Essential hypertension  Continue current medications        RTC four months with labs    Alisson Shipman MD

## 2020-04-08 ENCOUNTER — DOCUMENTATION ONLY (OUTPATIENT)
Dept: HEMATOLOGY/ONCOLOGY | Facility: CLINIC | Age: 45
End: 2020-04-08

## 2020-04-08 NOTE — PROGRESS NOTES
SIDDHARTH [facility] for Xochitl Perez--[phone] to discuss Rx assistance options for pt.

## 2020-04-08 NOTE — PROGRESS NOTES
Pt is uninsured and has been prescribed Xeloda, oral chemo.  SIDDHARTH contacted [employer] and found that pt qualifies for their assistance program.  SIDDHARTH sent Rx assistance application to pt, called him to alert him of email, explained that he should fill out and send application ASAP and answered all questions to his satisfaction.  SIDDHARTH sent prescriber portion of application to Rosey Peck RN so provider can fill out their portion.  Instructions for faxing the form to [employer] are on the form.  SIDDHARTH asked that Rosey fax form because due to Covid restrictions SIDDHARTH is working remotely.  SIDDHARTH left direct contact information for pt and Rosey.

## 2020-04-08 NOTE — PROGRESS NOTES
Dr. Bahena, pt's provider emailed that Mallory in pharmacy is handling pt's Rx assistance application through the drug's .  SIDDHARTH Tejada to confirm and will follow-up as needed.

## 2020-04-08 NOTE — TELEPHONE ENCOUNTER
assistance is now available for Xeloda. We will be assisting the patient in applying to the  patient assistance program. Sending a staff message to Dr. Bahena regarding assistance application and faxing application prescription for her review and signature.

## 2020-04-13 ENCOUNTER — TELEPHONE (OUTPATIENT)
Dept: HEMATOLOGY/ONCOLOGY | Facility: CLINIC | Age: 45
End: 2020-04-13

## 2020-04-13 ENCOUNTER — OFFICE VISIT (OUTPATIENT)
Dept: HEMATOLOGY/ONCOLOGY | Facility: CLINIC | Age: 45
End: 2020-04-13

## 2020-04-13 DIAGNOSIS — C50.021 MALIGNANT NEOPLASM INVOLVING BOTH NIPPLE AND AREOLA OF RIGHT BREAST IN MALE, ESTROGEN RECEPTOR NEGATIVE: Primary | ICD-10-CM

## 2020-04-13 DIAGNOSIS — D50.9 MICROCYTIC ANEMIA: ICD-10-CM

## 2020-04-13 DIAGNOSIS — F43.23 ADJUSTMENT DISORDER WITH MIXED ANXIETY AND DEPRESSED MOOD: ICD-10-CM

## 2020-04-13 DIAGNOSIS — Z79.899 ENCOUNTER FOR LONG-TERM (CURRENT) USE OF HIGH-RISK MEDICATION: ICD-10-CM

## 2020-04-13 DIAGNOSIS — G62.0 CHEMOTHERAPY-INDUCED NEUROPATHY: ICD-10-CM

## 2020-04-13 DIAGNOSIS — E11.9 TYPE 2 DIABETES MELLITUS WITHOUT COMPLICATION, WITHOUT LONG-TERM CURRENT USE OF INSULIN: ICD-10-CM

## 2020-04-13 DIAGNOSIS — I10 ESSENTIAL HYPERTENSION: ICD-10-CM

## 2020-04-13 DIAGNOSIS — T45.1X5A CHEMOTHERAPY-INDUCED NEUROPATHY: ICD-10-CM

## 2020-04-13 DIAGNOSIS — E66.01 CLASS 3 SEVERE OBESITY DUE TO EXCESS CALORIES WITH SERIOUS COMORBIDITY AND BODY MASS INDEX (BMI) OF 50.0 TO 59.9 IN ADULT: ICD-10-CM

## 2020-04-13 DIAGNOSIS — Z17.1 MALIGNANT NEOPLASM INVOLVING BOTH NIPPLE AND AREOLA OF RIGHT BREAST IN MALE, ESTROGEN RECEPTOR NEGATIVE: Primary | ICD-10-CM

## 2020-04-13 PROCEDURE — 99215 PR OFFICE/OUTPT VISIT, EST, LEVL V, 40-54 MIN: ICD-10-PCS | Mod: 95,,, | Performed by: INTERNAL MEDICINE

## 2020-04-13 PROCEDURE — 99215 OFFICE O/P EST HI 40 MIN: CPT | Mod: 95,,, | Performed by: INTERNAL MEDICINE

## 2020-04-13 NOTE — PROGRESS NOTES
History:     Reason For Follow Up:   1. Stage IB ([id number]) triple negative invasive ductal carcinoma with lobular features of right breast, retroareolar, Grade 2, Ki67 80%, diagnosed 5/2019    HPI:   Paul Li Jr. presents for follow up of breast cancer.  Radiation therapy completed and skin changes resolved. He was approved for Xeloda drug assistance and is awaiting arrival in the mail. Doing well. Neuropathy stable. Mood improving.     Onc History:     Malignant neoplasm involving both nipple and areola of right breast in male, estrogen receptor negative    5/1/2019 Imaging Significant Findings     Mammogram and US  Impression:  Right  Mass: Right breast 14 mm mass at the retroareolar anterior position. Assessment: 4 - Suspicious finding. Biopsy is recommended.      Left  There is no mammographic or sonographic evidence of malignancy.          Recommendation:  Biopsy is recommended.      5/2/2019 Biopsy     BREAST, RIGHT, RETROAREOLAR MASS, ULTRASOUND-GUIDED BIOPSY:  Invasive ductal carcinoma with lobular features.  Size of invasive carcinoma: 5 MM in greatest linear dimension within a single      5/2/2019 Breast Tumor Markers     Estrogen: Negative  Progesterone: Negative  HER2: Negative  Ki67: 80      5/2/2019 Cancer Staged     Staging form: Breast, AJCC 8th Edition  - Clinical stage from 5/17/2019: Stage IB (cT1c, cN0, cM0, G2, ER-, RI-, HER2-) - Signed by Richa Bahena MD on 5/17/2019 5/27/2019 - 7/21/2019 Chemotherapy     Treatment Summary   Plan Name: OP BREAST DOSE-DENSE AC - DOXORUBICIN CYCLOPHOSPHAMIDE Q2W  Treatment Goal: Curative  Status: Inactive  Start Date: 5/27/2019  End Date: 7/9/2019  Provider: Richa Bahena MD  Chemotherapy: DOXOrubicin chemo injection 186 mg, 60 mg/m2 = 186 mg, Intravenous, Clinic/HOD 1 time, 4 of 4 cycles  Administration: 186 mg (5/27/2019), 186 mg (6/10/2019), 186 mg (6/24/2019), 186 mg (7/8/2019)  cyclophosphamide (CYTOXAN) 600 mg/m2 = 1,865 mg in sodium  chloride 0.9% 250 mL chemo infusion, 600 mg/m2 = 1,865 mg, Intravenous, Clinic/HOD 1 time, 4 of 4 cycles  Administration: 1,865 mg (5/27/2019), 1,865 mg (6/10/2019), 1,865 mg (6/24/2019), 1,865 mg (7/8/2019)      7/22/2019 - 10/15/2019 Chemotherapy     Treatment Summary   Plan Name: OP PACLITAXEL QW  Treatment Goal: Curative  Status: Inactive  Start Date: 7/24/2019  End Date: 10/8/2019  Provider: Richa Bahena MD  Chemotherapy: PACLitaxel (TAXOL) 80 mg/m2 = 246 mg in sodium chloride 0.9% 250 mL chemo infusion, 80 mg/m2 = 246 mg, Intravenous, Clinic/HOD 1 time, 12 of 12 cycles  Dose modification: 60 mg/m2 (original dose 80 mg/m2, Cycle 3), 55 mg/m2 (original dose 80 mg/m2, Cycle 7), 60 mg/m2 (original dose 80 mg/m2, Cycle 7), 50 mg/m2 (original dose 80 mg/m2, Cycle 9), 50 mg/m2 (original dose 80 mg/m2, Cycle 10)  Administration: 246 mg (7/24/2019), 246 mg (7/31/2019), 186 mg (8/7/2019), 186 mg (8/14/2019), 186 mg (8/21/2019), 186 mg (8/28/2019), 186 mg (9/4/2019), 174 mg (9/11/2019), 156 mg (9/18/2019), 156 mg (9/25/2019), 156 mg (10/1/2019), 156 mg (10/8/2019)      11/22/2019 Breast Surgery     On 11/22/19 Dr whyte performed a right breast mastectomy with SLN biopsy with pathology showing grade 2, 6 mm IDC with extensive treatment effect, no LVI with 1 LN positive 4 mm, negative margins. The on 12/17/19, Dr Whyte performed right axillary dissection with pathology still pending.      11/22/2019 Cancer Staged     ypT1b N1      12/17/2019 Breast Surgery     Surgery: Dr Shima Whyte, [phone] performed right ALND with pathology showing 14 negative lymph nodes.           1/14/2020 Tumor Conference        Post mastectomy radiation therapy: Xeloda, then possible Keytruda trial .      2/3/2020 - 3/23/2020 Radiation Therapy     Treating physician: Speedy Nair MD   Total Dose: 50.4 Gy to the chest wall and regional lymphatics  10 Gy boost to the prostate.   Fractions: 28 fractions at 1.8 Gy per fraction  5  fractions at 2 Gy per fraction       2/28/2020 -  Chemotherapy     Treatment Summary   Plan Name: OP CAPECITABINE 1250MG/M2 BID Q3W  Treatment Goal: Curative  Status: Active  Start Date: 3/20/2020  End Date: 3/20/2020  Provider: Richa Bahena MD  Chemotherapy: [No matching medication found in this treatment plan]           Medications    Current Outpatient Medications:     amLODIPine (NORVASC) 10 MG tablet, TAKE 1 TABLET (10 MG) BY MOUTH EVERY DAY (Patient taking differently: Take 10 mg by mouth every morning. ), Disp: 30 tablet, Rfl: 10    atorvastatin (LIPITOR) 20 MG tablet, Take 1 tablet (20 mg total) by mouth once daily., Disp: 90 tablet, Rfl: 3    blood sugar diagnostic Strp, To check BG 4 times daily, to use with insurance preferred meter, Disp: 400 each, Rfl: 3    blood-glucose meter kit, Use as instructed, Disp: 1 each, Rfl: 0    capecitabine (XELODA) 500 MG Tab, Take 6 tablets (3,000 mg total) by mouth 2 (two) times daily Take as directed days 1-14 of each 21 day cycle. Take with water within 30 minutes after a meal.., Disp: 168 tablet, Rfl: 6    dulaglutide (TRULICITY) 1.5 mg/0.5 mL PnIj, Inject 1.5 mg into the skin once a week. Trulicity 0.75mg weekly for 4 weeks & then increase to 1.5 mg weekly indefinitely., Disp: 6 mL, Rfl: 3    gabapentin (NEURONTIN) 300 MG capsule, Take 1 capsule (300 mg total) by mouth 3 (three) times daily., Disp: 90 capsule, Rfl: 11    hydroCHLOROthiazide (HYDRODIURIL) 25 MG tablet, TAKE 1 TABLET BY MOUTH ONCE DAILY, Disp: 90 tablet, Rfl: 3    lancets 33 gauge Misc, Use 2 (two) times daily with meals., Disp: 100 each, Rfl: 11    lancets 33 gauge Misc, To check BG 4 times daily, to use with insurance preferred meter, Disp: 200 each, Rfl: 11    lancing device Misc, 1 Device by Misc.(Non-Drug; Combo Route) route 2 (two) times daily with meals., Disp: 1 each, Rfl: 0    lidocaine-prilocaine (EMLA) cream, Apply topically to affected area 45 minutes before port access,  "Disp: 30 g, Rfl: 1    losartan (COZAAR) 100 MG tablet, TAKE 1 TABLET BY MOUTH ONCE DAILY, Disp: 90 tablet, Rfl: 3    metFORMIN (GLUCOPHAGE) 500 MG tablet, Take 2 tablets (1,000 mg total) by mouth 2 (two) times daily with meals., Disp: 90 tablet, Rfl: 3    ondansetron (ZOFRAN) 8 MG tablet, Take 1 tablet (8 mg total) by mouth every 8 (eight) hours as needed for Nausea., Disp: 30 tablet, Rfl: 2    ondansetron (ZOFRAN-ODT) 8 MG TbDL, Take 1 tablet (8 mg total) by mouth every 12 (twelve) hours as needed., Disp: 20 tablet, Rfl: 1    oxyCODONE-acetaminophen (PERCOCET) 5-325 mg per tablet, Take 1 tablet by mouth every 4 (four) hours as needed for Pain., Disp: 40 tablet, Rfl: 0    oxyCODONE-acetaminophen (PERCOCET) 5-325 mg per tablet, Take 1 tablet by mouth every 6 (six) hours as needed., Disp: 10 tablet, Rfl: 0    pen needle, diabetic (BD ULTRA-FINE TANESHA PEN NEEDLE) 32 gauge x 5/32" Ndle, Use as directed with novolog and levemir, Disp: 100 each, Rfl: 5    senna (SENOKOT) 8.6 mg tablet, Take 1 tablet by mouth once daily., Disp: , Rfl:     tadalafil (CIALIS) 5 MG tablet, Take 2 tablets (10 mg total) by mouth daily as needed for Erectile Dysfunction., Disp: 20 tablet, Rfl: 1  No current facility-administered medications for this visit.     Facility-Administered Medications Ordered in Other Visits:     heparin, porcine (PF) 100 unit/mL injection flush 500 Units, 500 Units, Intravenous, 1 time in Clinic/HOD, Richa Bahena MD    sodium chloride 0.9% flush 10 mL, 10 mL, Intravenous, 1 time in Clinic/HOD, Richa Bahena MD  Allergies  Review of patient's allergies indicates:  No Known Allergies  Review of Systems  Review of Systems   Constitutional: Negative for activity change, appetite change, chills, fatigue, fever and unexpected weight change.   HENT: Negative for congestion, hearing loss, nosebleeds, sinus pressure, sinus pain and trouble swallowing.    Eyes: Negative for pain, discharge, itching and visual " disturbance.   Respiratory: Negative for cough, chest tightness and shortness of breath.    Cardiovascular: Negative for chest pain, palpitations and leg swelling.   Gastrointestinal: Negative for abdominal distention, abdominal pain, blood in stool, constipation, diarrhea, nausea, rectal pain and vomiting.   Endocrine: Negative for heat intolerance and polydipsia.   Genitourinary: Negative for difficulty urinating, dysuria, flank pain, frequency, hematuria and urgency.   Musculoskeletal: Negative for arthralgias, back pain and neck pain.   Skin: Negative for color change, pallor and rash.        Radiation skin changes   Neurological: Negative for dizziness, numbness and headaches.   Hematological: Negative for adenopathy. Does not bruise/bleed easily.   Psychiatric/Behavioral: Negative for confusion, decreased concentration, dysphoric mood and sleep disturbance. The patient is not nervous/anxious.         Objective     Objective:     Physical Exam   Constitutional: He is oriented to person, place, and time. He appears well-developed and well-nourished.   Neurological: He is alert and oriented to person, place, and time.   Psychiatric: He has a normal mood and affect. His behavior is normal. Thought content normal.         Labs and Imaging  Results for orders placed or performed in visit on 02/14/20   CBC Oncology   Result Value Ref Range    WBC 2.97 (L) 3.90 - 12.70 K/uL    RBC 5.05 4.60 - 6.20 M/uL    Hemoglobin 11.7 (L) 14.0 - 18.0 g/dL    Hematocrit 38.8 (L) 40.0 - 54.0 %    Mean Corpuscular Volume 77 (L) 82 - 98 fL    Mean Corpuscular Hemoglobin 23.2 (L) 27.0 - 31.0 pg    Mean Corpuscular Hemoglobin Conc 30.2 (L) 32.0 - 36.0 g/dL    RDW 16.2 (H) 11.5 - 14.5 %    Platelets 188 150 - 350 K/uL    MPV 9.8 9.2 - 12.9 fL    Gran # (ANC) 1.7 (L) 1.8 - 7.7 K/uL    Immature Grans (Abs) 0.01 0.00 - 0.04 K/uL   Comprehensive metabolic panel   Result Value Ref Range    Sodium 137 136 - 145 mmol/L    Potassium 4.0 3.5 - 5.1  mmol/L    Chloride 104 95 - 110 mmol/L    CO2 26 23 - 29 mmol/L    Glucose 94 70 - 110 mg/dL    BUN, Bld 9 6 - 20 mg/dL    Creatinine 1.2 0.5 - 1.4 mg/dL    Calcium 9.1 8.7 - 10.5 mg/dL    Total Protein 7.8 6.0 - 8.4 g/dL    Albumin 3.9 3.5 - 5.2 g/dL    Total Bilirubin 0.6 0.1 - 1.0 mg/dL    Alkaline Phosphatase 89 55 - 135 U/L    AST 14 10 - 40 U/L    ALT 17 10 - 44 U/L    Anion Gap 7 (L) 8 - 16 mmol/L    eGFR if African American >60.0 >60 mL/min/1.73 m^2    eGFR if non African American >60.0 >60 mL/min/1.73 m^2       Assessment     Assessment:   1. Stage IB (T4oT7G3) invasive ductal carcinoma with lobular features of right breast, retroareolar, ER neg, IA neg, Her2 neg, Grade 2, Ki67 80%, diagnosed 5/2019   * Genetics negative   * 5/27/19 - 10/8/19 completed neoadjuvant ddAC followed by weekly Taxol.    * On 11/22/19 Dr Whyte performed a right breast mastectomy with SLN biopsy with pathology showing grade 2, 6 mm IDC with extensive treatment effect, no LVI with 1 LN positive 4 mm, negative margins. The on 12/17/19, Dr Whyte performed right axillary dissection with pathology no remaining malignancy.   * 2/3/2020 - 3/12/2020 completed RT   * 4/13/2020 start (once arrives) Xeloda 1000 mg/m2 bid days 1-14 every 21 days for 6-8 cycles depending on tolerance.      2. Microcytic anemia   * Has anemia as baseline but worsened with chemotherapy   * improving.    3. Adjustment disorder/depression.    * Dr Nunez; On effexor as of late 12/2019    4. Leukopenia   * Repeat labs in 1 wk    5. HTN and Diabetes per PCP; last hemoglobin A1c     6. Insomnia   * Tried melatonin and Restoril without success    7. Chemo neuropathy   * Gabapentin 300 mg tid.     8. Lymphedema   * Working with PT  Plan:     1. Start Xeloda 1000 mg/m2 days 1-14 every 21 days. Will start once it arrives in mail - he will call. Will go on and check labs in 1 wk and then I want to see him virtually with labs 3 wks after he starts. Discussed increased  risk of coronavirus with decreased immune system and his increased risk of complications with Diagnostic mammogram and obesity. He voiced understanding and wants to proceed.   2.  eval after Xeloda  3. Continue gabapentin 300 mg tid.   4. Diabetes management  5. Port flush and labs in 1 wk    Future Appointments   Date Time Provider Department Center   4/13/2020 10:30 AM Richa Bahena MD [facility] HEM ONC [place]   6/15/2020 11:15 [facility]   7/6/2020 10:00 AM [facility]     The patient location is: home  The chief complaint leading to consultation is: breast ca  Visit type: Virtual visit with synchronous audio and video  Total time spent with patient: 20 min  Each patient to whom he or she provides medical services by telemedicine is:  (1) informed of the relationship between the physician and patient and the respective role of any other health care provider with respect to management of the patient; and (2) notified that he or she may decline to receive medical services by telemedicine and may withdraw from such care at any time.

## 2020-04-13 NOTE — Clinical Note
Port flush, rehana bernardo in 1 wk . He will notify us when he gets Xeloda tabs and then I want to do a virtual visit with him 3 wks after that with rehana bernardo.

## 2020-04-15 ENCOUNTER — DOCUMENTATION ONLY (OUTPATIENT)
Dept: HEMATOLOGY/ONCOLOGY | Facility: CLINIC | Age: 45
End: 2020-04-15

## 2020-04-15 NOTE — PROGRESS NOTES
Mallory from specialty pharmacy asked SW if she had received pt's portion of his drug assistance application.  SIDDHARTH informed her via msg that pt intended to fax his own portion directly to the .  SIDDHARTH called pt in an attempt to f/u and LVM.  SW will f/u accordingly.

## 2020-04-16 DIAGNOSIS — Z13.9 SCREENING FOR CONDITION: Primary | ICD-10-CM

## 2020-04-16 NOTE — PROGRESS NOTES
04/16/2020      In an effort to protect our immunocompromised patients from potential exposure to COVID-19, [facility] will now require all patients receiving an infusion, an injection, and/or radiation therapy to be tested for COVID-19 prior to their appointment.  All patients currently under treatment will be tested immediately, and patients initiating new treatment cycles or with one-time appointments (injections, transfusions, etc.) must be tested within 72 hours of their appointment.     Placed COVID-19 test order for patient.  A member of our team is to contact the patient in the near future to explain this process and the rationale behind it, to ask the COVID-19 screening questions, and to get the patient scheduled for their COVID-19 test.     The above was completed in accordance with instructions and guidelines set forth by [facility].     Signed,    Oliverio Kolb, KAYDEN     Date:  04/16/2020

## 2020-04-18 ENCOUNTER — LAB VISIT (OUTPATIENT)
Dept: INTERNAL MEDICINE | Facility: CLINIC | Age: 45
End: 2020-04-18
Payer: OTHER GOVERNMENT

## 2020-04-18 DIAGNOSIS — Z13.9 SCREENING FOR CONDITION: ICD-10-CM

## 2020-04-18 LAB — SARS-COV-2 RNA RESP QL NAA+PROBE: NOT DETECTED

## 2020-04-18 PROCEDURE — U0002 COVID-19 LAB TEST NON-CDC: HCPCS

## 2020-04-20 ENCOUNTER — LAB VISIT (OUTPATIENT)
Dept: LAB | Facility: HOSPITAL | Age: 45
End: 2020-04-20
Attending: INTERNAL MEDICINE

## 2020-04-20 DIAGNOSIS — Z17.1 MALIGNANT NEOPLASM INVOLVING BOTH NIPPLE AND AREOLA OF RIGHT BREAST IN MALE, ESTROGEN RECEPTOR NEGATIVE: ICD-10-CM

## 2020-04-20 DIAGNOSIS — C50.021 MALIGNANT NEOPLASM INVOLVING BOTH NIPPLE AND AREOLA OF RIGHT BREAST IN MALE, ESTROGEN RECEPTOR NEGATIVE: ICD-10-CM

## 2020-04-20 LAB
ALBUMIN SERPL BCP-MCNC: 3.8 G/DL (ref 3.5–5.2)
ALP SERPL-CCNC: 75 U/L (ref 55–135)
ALT SERPL W/O P-5'-P-CCNC: 46 U/L (ref 10–44)
ANION GAP SERPL CALC-SCNC: 9 MMOL/L (ref 8–16)
AST SERPL-CCNC: 32 U/L (ref 10–40)
BILIRUB SERPL-MCNC: 0.9 MG/DL (ref 0.1–1)
BUN SERPL-MCNC: 12 MG/DL (ref 6–20)
CALCIUM SERPL-MCNC: 9.2 MG/DL (ref 8.7–10.5)
CHLORIDE SERPL-SCNC: 107 MMOL/L (ref 95–110)
CO2 SERPL-SCNC: 24 MMOL/L (ref 23–29)
CREAT SERPL-MCNC: 1.3 MG/DL (ref 0.5–1.4)
ERYTHROCYTE [DISTWIDTH] IN BLOOD BY AUTOMATED COUNT: 18.4 % (ref 11.5–14.5)
EST. GFR  (AFRICAN AMERICAN): >60 ML/MIN/1.73 M^2
EST. GFR  (NON AFRICAN AMERICAN): >60 ML/MIN/1.73 M^2
GLUCOSE SERPL-MCNC: 101 MG/DL (ref 70–110)
HCT VFR BLD AUTO: 39.7 % (ref 40–54)
HGB BLD-MCNC: 12.1 G/DL (ref 14–18)
IMM GRANULOCYTES # BLD AUTO: 0.01 K/UL (ref 0–0.04)
MCH RBC QN AUTO: 24.4 PG (ref 27–31)
MCHC RBC AUTO-ENTMCNC: 30.5 G/DL (ref 32–36)
MCV RBC AUTO: 80 FL (ref 82–98)
NEUTROPHILS # BLD AUTO: 1.7 K/UL (ref 1.8–7.7)
PLATELET # BLD AUTO: 165 K/UL (ref 150–350)
PMV BLD AUTO: 11.1 FL (ref 9.2–12.9)
POTASSIUM SERPL-SCNC: 4 MMOL/L (ref 3.5–5.1)
PROT SERPL-MCNC: 7.6 G/DL (ref 6–8.4)
RBC # BLD AUTO: 4.95 M/UL (ref 4.6–6.2)
SODIUM SERPL-SCNC: 140 MMOL/L (ref 136–145)
WBC # BLD AUTO: 3.45 K/UL (ref 3.9–12.7)

## 2020-04-20 PROCEDURE — 36415 COLL VENOUS BLD VENIPUNCTURE: CPT

## 2020-04-20 PROCEDURE — 85027 COMPLETE CBC AUTOMATED: CPT

## 2020-04-20 PROCEDURE — 80053 COMPREHEN METABOLIC PANEL: CPT

## 2020-04-20 NOTE — PROGRESS NOTES
To:  Beba Fontenot, RN    [facility]-  Please phone this patient with the negative COVID-19 test results following the scripting and protocol we have reviewed.  If my assistance is needed, don't hesitate to reach out.  Thanks!  -Oliverio Kolb, DNP, APRN, FNP-BC, AOCNP  [facility], 3rd Floor  [facility] [address], [place]  [address]  [phone]

## 2020-04-20 NOTE — PROGRESS NOTES
1300 pt was seen here by injection nurse for port flush and lab draw, port flushed per policy without issue, unable to aspirate blood for blood draw, blood return scant, flushed port with heparin and deaccessed without issue, pt tdid not want to cath sophia port and wait, went to lab on 3rd for blood draw

## 2020-04-21 ENCOUNTER — TELEPHONE (OUTPATIENT)
Dept: ADMINISTRATIVE | Facility: CLINIC | Age: 45
End: 2020-04-21

## 2020-04-21 NOTE — TELEPHONE ENCOUNTER
Symptom Patient's Response   Fever YES/NO: no   Cough YES/NO: no   Shortness of breath  YES/NO: no   Difficulty breathing YES/NO: no   GI symptoms such as diarrhea or nausea YES/NO: no   Loss of taste YES/NO: no   Loss of smell YES/NO: no     Other Screening Patient's Response   Has the patient previously undergone COVID-19 testing? YES/NO: no     If yes to the question directly above, what was the result? not applicable   Has the patient been in close contact with someone who has undergone COVID-19 testing? YES/NO: no     If yes to the question directly above, what was the result? not applicable     04/21/2020    - COVID-19 testing Collection Date: 4/18/2020 Collection Time:   9:02 AM  - This result was communicated to the patient by Beba Fontenot on 04/21/2020 as below.     The following was discussed with the patient via telephone call:  The test was NEGATIVE for COVID-19 (coronavirus), and based on those negative results, treatment at the cancer center can proceed as planned.  Questions were encouraged and answered to patient's satisfaction, and patient verbalized understanding of information and agreement with the plan.     Signed,    Beba Fontenot

## 2020-04-24 NOTE — TELEPHONE ENCOUNTER
FOR DOCUMENTATION ONLY:  Financial Assistance for Xeloda is approved 4- indefinitely  [employer] Patient [employer] [phone]    [facility](dispensing pharmacy) [phone]    Sending a staff message to Dr Bahena regarding approval

## 2020-04-29 ENCOUNTER — TELEPHONE (OUTPATIENT)
Dept: PHARMACY | Facility: CLINIC | Age: 45
End: 2020-04-29

## 2020-04-29 NOTE — TELEPHONE ENCOUNTER
Patient called on 4/2 /20202 requesting assistance for XeLoda.  [facility] was able to get him a gilbert for the medication.  Patient will call me when he needs a refill on his insulins

## 2020-05-04 ENCOUNTER — PATIENT MESSAGE (OUTPATIENT)
Dept: HEMATOLOGY/ONCOLOGY | Facility: CLINIC | Age: 45
End: 2020-05-04

## 2020-05-05 ENCOUNTER — PATIENT MESSAGE (OUTPATIENT)
Dept: ADMINISTRATIVE | Facility: HOSPITAL | Age: 45
End: 2020-05-05

## 2020-05-11 ENCOUNTER — TELEPHONE (OUTPATIENT)
Dept: SURGERY | Facility: CLINIC | Age: 45
End: 2020-05-11

## 2020-05-11 NOTE — TELEPHONE ENCOUNTER
Message was left for Mr.Gary Guillermo Baez. Please call Niecy Saldana @ [phone] regarding your apt . Mailed out patients new apt date & time on 07/6/20.

## 2020-05-12 ENCOUNTER — PATIENT MESSAGE (OUTPATIENT)
Dept: HEMATOLOGY/ONCOLOGY | Facility: CLINIC | Age: 45
End: 2020-05-12

## 2020-05-13 ENCOUNTER — LAB VISIT (OUTPATIENT)
Dept: LAB | Facility: HOSPITAL | Age: 45
End: 2020-05-13
Attending: INTERNAL MEDICINE
Payer: OTHER GOVERNMENT

## 2020-05-13 ENCOUNTER — OFFICE VISIT (OUTPATIENT)
Dept: HEMATOLOGY/ONCOLOGY | Facility: CLINIC | Age: 45
End: 2020-05-13

## 2020-05-13 DIAGNOSIS — Z17.1 MALIGNANT NEOPLASM INVOLVING BOTH NIPPLE AND AREOLA OF RIGHT BREAST IN MALE, ESTROGEN RECEPTOR NEGATIVE: Primary | ICD-10-CM

## 2020-05-13 DIAGNOSIS — Z79.899 ENCOUNTER FOR LONG-TERM (CURRENT) USE OF HIGH-RISK MEDICATION: ICD-10-CM

## 2020-05-13 DIAGNOSIS — C50.021 MALIGNANT NEOPLASM INVOLVING BOTH NIPPLE AND AREOLA OF RIGHT BREAST IN MALE, ESTROGEN RECEPTOR NEGATIVE: Primary | ICD-10-CM

## 2020-05-13 DIAGNOSIS — Z17.1 MALIGNANT NEOPLASM INVOLVING BOTH NIPPLE AND AREOLA OF RIGHT BREAST IN MALE, ESTROGEN RECEPTOR NEGATIVE: ICD-10-CM

## 2020-05-13 DIAGNOSIS — C50.021 MALIGNANT NEOPLASM INVOLVING BOTH NIPPLE AND AREOLA OF RIGHT BREAST IN MALE, ESTROGEN RECEPTOR NEGATIVE: ICD-10-CM

## 2020-05-13 LAB
ALBUMIN SERPL BCP-MCNC: 3.7 G/DL (ref 3.5–5.2)
ALP SERPL-CCNC: 80 U/L (ref 55–135)
ALT SERPL W/O P-5'-P-CCNC: 29 U/L (ref 10–44)
ANION GAP SERPL CALC-SCNC: 11 MMOL/L (ref 8–16)
AST SERPL-CCNC: 28 U/L (ref 10–40)
BILIRUB SERPL-MCNC: 0.7 MG/DL (ref 0.1–1)
BUN SERPL-MCNC: 14 MG/DL (ref 6–20)
CALCIUM SERPL-MCNC: 9.1 MG/DL (ref 8.7–10.5)
CHLORIDE SERPL-SCNC: 109 MMOL/L (ref 95–110)
CO2 SERPL-SCNC: 18 MMOL/L (ref 23–29)
CREAT SERPL-MCNC: 1.2 MG/DL (ref 0.5–1.4)
ERYTHROCYTE [DISTWIDTH] IN BLOOD BY AUTOMATED COUNT: 16.7 % (ref 11.5–14.5)
EST. GFR  (AFRICAN AMERICAN): >60 ML/MIN/1.73 M^2
EST. GFR  (NON AFRICAN AMERICAN): >60 ML/MIN/1.73 M^2
GLUCOSE SERPL-MCNC: 86 MG/DL (ref 70–110)
HCT VFR BLD AUTO: 38.2 % (ref 40–54)
HGB BLD-MCNC: 12.4 G/DL (ref 14–18)
IMM GRANULOCYTES # BLD AUTO: 0.09 K/UL (ref 0–0.04)
MCH RBC QN AUTO: 24.7 PG (ref 27–31)
MCHC RBC AUTO-ENTMCNC: 32.5 G/DL (ref 32–36)
MCV RBC AUTO: 76 FL (ref 82–98)
NEUTROPHILS # BLD AUTO: 2.1 K/UL (ref 1.8–7.7)
PLATELET # BLD AUTO: 81 K/UL (ref 150–350)
PMV BLD AUTO: 11 FL (ref 9.2–12.9)
POTASSIUM SERPL-SCNC: 4.8 MMOL/L (ref 3.5–5.1)
PROT SERPL-MCNC: 7.6 G/DL (ref 6–8.4)
RBC # BLD AUTO: 5.03 M/UL (ref 4.6–6.2)
SODIUM SERPL-SCNC: 138 MMOL/L (ref 136–145)
WBC # BLD AUTO: 3.93 K/UL (ref 3.9–12.7)

## 2020-05-13 PROCEDURE — 85027 COMPLETE CBC AUTOMATED: CPT

## 2020-05-13 PROCEDURE — 99442 PR PHYSICIAN TELEPHONE EVALUATION 11-20 MIN: ICD-10-PCS | Mod: 95,,, | Performed by: INTERNAL MEDICINE

## 2020-05-13 PROCEDURE — 36415 COLL VENOUS BLD VENIPUNCTURE: CPT

## 2020-05-13 PROCEDURE — 99442 PR PHYSICIAN TELEPHONE EVALUATION 11-20 MIN: CPT | Mod: 95,,, | Performed by: INTERNAL MEDICINE

## 2020-05-13 PROCEDURE — 80053 COMPREHEN METABOLIC PANEL: CPT

## 2020-05-13 NOTE — PROGRESS NOTES
Established Patient - Audio Only Telehealth Visit     The patient location is: home  The chief complaint leading to consultation is: breast ca; prognosis.  Visit type: Virtual visit with audio only (telephone)  Total time spent with patient: 15 min       The reason for the audio only service rather than synchronous audio and video virtual visit was related to technical difficulties or patient preference/necessity.     Each patient to whom I provide medical services by telemedicine is:  (1) informed of the relationship between the physician and patient and the respective role of any other health care provider with respect to management of the patient; and (2) notified that they may decline to receive medical services by telemedicine and may withdraw from such care at any time. Patient verbally consented to receive this service via voice-only telephone call.       HPI: patient received Xeloda in the mail. He is anxious to start these tablets and is worried about the side effects. Otherwise feeling well.      Assessment and plan:  Reviewed goals and side effects of Xeloda and he's agreeable to proceed. He knows that he is higher risk for infection and complications from coronavirus and should consider himself part of the high risk population and continue take strong precautions. Will arrange cbc, cmp today. He will start Xeloda on 5/14 and I will see back with labs in 2 wks.      This service was not originating from a related E/M service provided within the previous 7 days nor will  to an E/M service or procedure within the next 24 hours or my soonest available appointment.  Prevailing standard of care was able to be met in this audio-only visit.

## 2020-06-10 DIAGNOSIS — C50.021 MALIGNANT NEOPLASM INVOLVING BOTH NIPPLE AND AREOLA OF RIGHT BREAST IN MALE, ESTROGEN RECEPTOR NEGATIVE: ICD-10-CM

## 2020-06-10 DIAGNOSIS — Z17.1 MALIGNANT NEOPLASM INVOLVING BOTH NIPPLE AND AREOLA OF RIGHT BREAST IN MALE, ESTROGEN RECEPTOR NEGATIVE: ICD-10-CM

## 2020-06-10 RX ORDER — CAPECITABINE 500 MG/1
1000 TABLET, FILM COATED ORAL 2 TIMES DAILY
Qty: 168 TABLET | Refills: 6 | Status: SHIPPED | OUTPATIENT
Start: 2020-06-10 | End: 2020-06-10 | Stop reason: SDUPTHER

## 2020-06-10 RX ORDER — CAPECITABINE 500 MG/1
1000 TABLET, FILM COATED ORAL 2 TIMES DAILY
Qty: 168 TABLET | Refills: 6 | Status: SHIPPED | OUTPATIENT
Start: 2020-06-10 | End: 2020-06-16 | Stop reason: SDUPTHER

## 2020-06-12 ENCOUNTER — LAB VISIT (OUTPATIENT)
Dept: LAB | Facility: HOSPITAL | Age: 45
End: 2020-06-12
Attending: INTERNAL MEDICINE

## 2020-06-12 DIAGNOSIS — Z17.1 MALIGNANT NEOPLASM INVOLVING BOTH NIPPLE AND AREOLA OF RIGHT BREAST IN MALE, ESTROGEN RECEPTOR NEGATIVE: ICD-10-CM

## 2020-06-12 DIAGNOSIS — C50.021 MALIGNANT NEOPLASM INVOLVING BOTH NIPPLE AND AREOLA OF RIGHT BREAST IN MALE, ESTROGEN RECEPTOR NEGATIVE: ICD-10-CM

## 2020-06-12 DIAGNOSIS — Z79.899 ENCOUNTER FOR LONG-TERM (CURRENT) USE OF HIGH-RISK MEDICATION: ICD-10-CM

## 2020-06-12 LAB
ALBUMIN SERPL BCP-MCNC: 3.9 G/DL (ref 3.5–5.2)
ALP SERPL-CCNC: 82 U/L (ref 55–135)
ALT SERPL W/O P-5'-P-CCNC: 31 U/L (ref 10–44)
ANION GAP SERPL CALC-SCNC: 8 MMOL/L (ref 8–16)
AST SERPL-CCNC: 21 U/L (ref 10–40)
BILIRUB SERPL-MCNC: 0.7 MG/DL (ref 0.1–1)
BUN SERPL-MCNC: 17 MG/DL (ref 6–20)
CALCIUM SERPL-MCNC: 9 MG/DL (ref 8.7–10.5)
CHLORIDE SERPL-SCNC: 106 MMOL/L (ref 95–110)
CO2 SERPL-SCNC: 28 MMOL/L (ref 23–29)
CREAT SERPL-MCNC: 1.3 MG/DL (ref 0.5–1.4)
ERYTHROCYTE [DISTWIDTH] IN BLOOD BY AUTOMATED COUNT: 17.5 % (ref 11.5–14.5)
EST. GFR  (AFRICAN AMERICAN): >60 ML/MIN/1.73 M^2
EST. GFR  (NON AFRICAN AMERICAN): >60 ML/MIN/1.73 M^2
GLUCOSE SERPL-MCNC: 104 MG/DL (ref 70–110)
HCT VFR BLD AUTO: 41.4 % (ref 40–54)
HGB BLD-MCNC: 12.6 G/DL (ref 14–18)
IMM GRANULOCYTES # BLD AUTO: 0.01 K/UL (ref 0–0.04)
MCH RBC QN AUTO: 25.6 PG (ref 27–31)
MCHC RBC AUTO-ENTMCNC: 30.4 G/DL (ref 32–36)
MCV RBC AUTO: 84 FL (ref 82–98)
NEUTROPHILS # BLD AUTO: 2 K/UL (ref 1.8–7.7)
PLATELET # BLD AUTO: 158 K/UL (ref 150–350)
PMV BLD AUTO: 10.2 FL (ref 9.2–12.9)
POTASSIUM SERPL-SCNC: 4.3 MMOL/L (ref 3.5–5.1)
PROT SERPL-MCNC: 7.5 G/DL (ref 6–8.4)
RBC # BLD AUTO: 4.93 M/UL (ref 4.6–6.2)
SODIUM SERPL-SCNC: 142 MMOL/L (ref 136–145)
WBC # BLD AUTO: 3.5 K/UL (ref 3.9–12.7)

## 2020-06-12 PROCEDURE — 80053 COMPREHEN METABOLIC PANEL: CPT

## 2020-06-12 PROCEDURE — 36415 COLL VENOUS BLD VENIPUNCTURE: CPT

## 2020-06-12 PROCEDURE — 85027 COMPLETE CBC AUTOMATED: CPT

## 2020-06-16 ENCOUNTER — OFFICE VISIT (OUTPATIENT)
Dept: HEMATOLOGY/ONCOLOGY | Facility: CLINIC | Age: 45
End: 2020-06-16
Payer: OTHER GOVERNMENT

## 2020-06-16 VITALS
HEIGHT: 74 IN | HEART RATE: 77 BPM | RESPIRATION RATE: 16 BRPM | DIASTOLIC BLOOD PRESSURE: 68 MMHG | SYSTOLIC BLOOD PRESSURE: 124 MMHG | WEIGHT: 315 LBS | OXYGEN SATURATION: 97 % | TEMPERATURE: 98 F | BODY MASS INDEX: 40.43 KG/M2

## 2020-06-16 DIAGNOSIS — D70.1 CHEMOTHERAPY-INDUCED NEUTROPENIA: ICD-10-CM

## 2020-06-16 DIAGNOSIS — D50.9 MICROCYTIC ANEMIA: ICD-10-CM

## 2020-06-16 DIAGNOSIS — D70.1 LEUKOPENIA DUE TO ANTINEOPLASTIC CHEMOTHERAPY: ICD-10-CM

## 2020-06-16 DIAGNOSIS — C50.021 MALIGNANT NEOPLASM INVOLVING BOTH NIPPLE AND AREOLA OF RIGHT BREAST IN MALE, ESTROGEN RECEPTOR NEGATIVE: Primary | ICD-10-CM

## 2020-06-16 DIAGNOSIS — Z79.899 ENCOUNTER FOR LONG-TERM (CURRENT) USE OF HIGH-RISK MEDICATION: ICD-10-CM

## 2020-06-16 DIAGNOSIS — Z17.1 MALIGNANT NEOPLASM INVOLVING BOTH NIPPLE AND AREOLA OF RIGHT BREAST IN MALE, ESTROGEN RECEPTOR NEGATIVE: Primary | ICD-10-CM

## 2020-06-16 DIAGNOSIS — T45.1X5A LEUKOPENIA DUE TO ANTINEOPLASTIC CHEMOTHERAPY: ICD-10-CM

## 2020-06-16 DIAGNOSIS — T45.1X5A CHEMOTHERAPY-INDUCED NEUTROPENIA: ICD-10-CM

## 2020-06-16 PROCEDURE — 99999 PR PBB SHADOW E&M-EST. PATIENT-LVL V: ICD-10-PCS | Mod: PBBFAC,,, | Performed by: INTERNAL MEDICINE

## 2020-06-16 PROCEDURE — 99999 PR PBB SHADOW E&M-EST. PATIENT-LVL V: CPT | Mod: PBBFAC,,, | Performed by: INTERNAL MEDICINE

## 2020-06-16 PROCEDURE — 99215 OFFICE O/P EST HI 40 MIN: CPT | Mod: PBBFAC | Performed by: INTERNAL MEDICINE

## 2020-06-16 PROCEDURE — 99214 PR OFFICE/OUTPT VISIT, EST, LEVL IV, 30-39 MIN: ICD-10-PCS | Mod: S$PBB,,, | Performed by: INTERNAL MEDICINE

## 2020-06-16 PROCEDURE — 99214 OFFICE O/P EST MOD 30 MIN: CPT | Mod: S$PBB,,, | Performed by: INTERNAL MEDICINE

## 2020-06-16 RX ORDER — CAPECITABINE 500 MG/1
1000 TABLET, FILM COATED ORAL 2 TIMES DAILY
Qty: 168 TABLET | Refills: 6 | Status: SHIPPED | OUTPATIENT
Start: 2020-06-16 | End: 2020-06-16

## 2020-06-16 RX ORDER — CAPECITABINE 500 MG/1
1000 TABLET, FILM COATED ORAL 2 TIMES DAILY
Qty: 168 TABLET | Refills: 6 | Status: SHIPPED | OUTPATIENT
Start: 2020-06-16 | End: 2021-02-01

## 2020-06-16 NOTE — PROGRESS NOTES
History:     Reason For Follow Up:   1. Stage IB ([id number]) triple negative invasive ductal carcinoma with lobular features of right breast, retroareolar, Grade 2, Ki67 80%, diagnosed 5/2019    HPI:   Paul Li Jr. presents for follow up of breast cancer. He has started Xeloda on 5/14. Nausea with Xeloda but resolved when he eats first. Minimal hand changes. Generally doing very well. Lymphedema stable - in sleeve. Neuropathy improving.     Onc History:  Oncology History   Malignant neoplasm involving both nipple and areola of right breast in male, estrogen receptor negative   5/1/2019 Imaging Significant Findings    Mammogram and US  Impression:  Right  Mass: Right breast 14 mm mass at the retroareolar anterior position. Assessment: 4 - Suspicious finding. Biopsy is recommended.      Left  There is no mammographic or sonographic evidence of malignancy.          Recommendation:  Biopsy is recommended.     5/2/2019 Biopsy    BREAST, RIGHT, RETROAREOLAR MASS, ULTRASOUND-GUIDED BIOPSY:  Invasive ductal carcinoma with lobular features.  Size of invasive carcinoma: 5 MM in greatest linear dimension within a single     5/2/2019 Breast Tumor Markers    Estrogen: Negative  Progesterone: Negative  HER2: Negative  Ki67: 80     5/2/2019 Cancer Staged    Staging form: Breast, AJCC 8th Edition  - Clinical stage from 5/17/2019: Stage IB (cT1c, cN0, cM0, G2, ER-, NM-, HER2-) - Signed by Richa Bahena MD on 5/17/2019 5/27/2019 - 7/21/2019 Chemotherapy    Treatment Summary   Plan Name: OP BREAST DOSE-DENSE AC - DOXORUBICIN CYCLOPHOSPHAMIDE Q2W  Treatment Goal: Curative  Status: Inactive  Start Date: 5/27/2019  End Date: 7/9/2019  Provider: Rciha Bahena MD  Chemotherapy: DOXOrubicin chemo injection 186 mg, 60 mg/m2 = 186 mg, Intravenous, Clinic/HOD 1 time, 4 of 4 cycles  Administration: 186 mg (5/27/2019), 186 mg (6/10/2019), 186 mg (6/24/2019), 186 mg (7/8/2019)  cyclophosphamide (CYTOXAN) 600 mg/m2 = 1,865 mg in  sodium chloride 0.9% 250 mL chemo infusion, 600 mg/m2 = 1,865 mg, Intravenous, Clinic/HOD 1 time, 4 of 4 cycles  Administration: 1,865 mg (5/27/2019), 1,865 mg (6/10/2019), 1,865 mg (6/24/2019), 1,865 mg (7/8/2019)     7/22/2019 - 10/15/2019 Chemotherapy    Treatment Summary   Plan Name: OP PACLITAXEL QW  Treatment Goal: Curative  Status: Inactive  Start Date: 7/24/2019  End Date: 10/8/2019  Provider: Richa Bahena MD  Chemotherapy: PACLitaxel (TAXOL) 80 mg/m2 = 246 mg in sodium chloride 0.9% 250 mL chemo infusion, 80 mg/m2 = 246 mg, Intravenous, Clinic/HOD 1 time, 12 of 12 cycles  Dose modification: 60 mg/m2 (original dose 80 mg/m2, Cycle 3), 55 mg/m2 (original dose 80 mg/m2, Cycle 7), 60 mg/m2 (original dose 80 mg/m2, Cycle 7), 50 mg/m2 (original dose 80 mg/m2, Cycle 9), 50 mg/m2 (original dose 80 mg/m2, Cycle 10)  Administration: 246 mg (7/24/2019), 246 mg (7/31/2019), 186 mg (8/7/2019), 186 mg (8/14/2019), 186 mg (8/21/2019), 186 mg (8/28/2019), 186 mg (9/4/2019), 174 mg (9/11/2019), 156 mg (9/18/2019), 156 mg (9/25/2019), 156 mg (10/1/2019), 156 mg (10/8/2019)     11/22/2019 Breast Surgery    On 11/22/19 Dr whyte performed a right breast mastectomy with SLN biopsy with pathology showing grade 2, 6 mm IDC with extensive treatment effect, no LVI with 1 LN positive 4 mm, negative margins. The on 12/17/19, Dr Whyte performed right axillary dissection with pathology still pending.     11/22/2019 Cancer Staged    ypT1b N1     12/17/2019 Breast Surgery    Surgery: Dr Shima Whyte, [phone] performed right ALND with pathology showing 14 negative lymph nodes.          1/14/2020 Tumor Conference       Post mastectomy radiation therapy: Xeloda, then possible Keytruda trial .     2/3/2020 - 3/23/2020 Radiation Therapy    Treating physician: Speedy Nair MD   Total Dose: 50.4 Gy to the chest wall and regional lymphatics  10 Gy boost to the prostate.   Fractions: 28 fractions at 1.8 Gy per fraction  5  fractions at 2 Gy per fraction      2/28/2020 -  Chemotherapy    Treatment Summary   Plan Name: OP CAPECITABINE 1250MG/M2 BID Q3W  Treatment Goal: Curative  Status: Active  Start Date: 3/20/2020  End Date: 3/20/2020  Provider: Richa Bahena MD  Chemotherapy: [No matching medication found in this treatment plan]           Medications    Current Outpatient Medications:     amLODIPine (NORVASC) 10 MG tablet, TAKE 1 TABLET (10 MG) BY MOUTH EVERY DAY (Patient taking differently: Take 10 mg by mouth every morning. ), Disp: 30 tablet, Rfl: 10    atorvastatin (LIPITOR) 20 MG tablet, Take 1 tablet (20 mg total) by mouth once daily., Disp: 90 tablet, Rfl: 3    blood sugar diagnostic Strp, To check BG 4 times daily, to use with insurance preferred meter, Disp: 400 each, Rfl: 3    blood-glucose meter kit, Use as instructed, Disp: 1 each, Rfl: 0    capecitabine (XELODA) 500 MG Tab, Take 6 tablets (3,000 mg total) by mouth 2 (two) times daily days 1-14 of each 21 day cycle. Take with water within 30 minutes after a meal., Disp: 168 tablet, Rfl: 6    dulaglutide (TRULICITY) 1.5 mg/0.5 mL PnIj, Inject 1.5 mg into the skin once a week. Trulicity 0.75mg weekly for 4 weeks & then increase to 1.5 mg weekly indefinitely., Disp: 6 mL, Rfl: 3    gabapentin (NEURONTIN) 300 MG capsule, Take 1 capsule (300 mg total) by mouth 3 (three) times daily., Disp: 90 capsule, Rfl: 11    hydroCHLOROthiazide (HYDRODIURIL) 25 MG tablet, TAKE 1 TABLET BY MOUTH ONCE DAILY, Disp: 90 tablet, Rfl: 3    lancets 33 gauge Misc, Use 2 (two) times daily with meals., Disp: 100 each, Rfl: 11    lancets 33 gauge Misc, To check BG 4 times daily, to use with insurance preferred meter, Disp: 200 each, Rfl: 11    lancing device Misc, 1 Device by Misc.(Non-Drug; Combo Route) route 2 (two) times daily with meals., Disp: 1 each, Rfl: 0    lidocaine-prilocaine (EMLA) cream, Apply topically to affected area 45 minutes before port access, Disp: 30 g, Rfl: 1     "losartan (COZAAR) 100 MG tablet, TAKE 1 TABLET BY MOUTH ONCE DAILY, Disp: 90 tablet, Rfl: 3    metFORMIN (GLUCOPHAGE) 500 MG tablet, Take 2 tablets (1,000 mg total) by mouth 2 (two) times daily with meals., Disp: 90 tablet, Rfl: 3    ondansetron (ZOFRAN) 8 MG tablet, Take 1 tablet (8 mg total) by mouth every 8 (eight) hours as needed for Nausea., Disp: 30 tablet, Rfl: 2    ondansetron (ZOFRAN-ODT) 8 MG TbDL, Take 1 tablet (8 mg total) by mouth every 12 (twelve) hours as needed., Disp: 20 tablet, Rfl: 1    pen needle, diabetic (BD ULTRA-FINE TANESHA PEN NEEDLE) 32 gauge x 5/32" Ndle, Use as directed with novolog and levemir, Disp: 100 each, Rfl: 5    senna (SENOKOT) 8.6 mg tablet, Take 1 tablet by mouth once daily., Disp: , Rfl:     tadalafil (CIALIS) 5 MG tablet, Take 2 tablets (10 mg total) by mouth daily as needed for Erectile Dysfunction., Disp: 20 tablet, Rfl: 1  No current facility-administered medications for this visit.     Facility-Administered Medications Ordered in Other Visits:     heparin, porcine (PF) 100 unit/mL injection flush 500 Units, 500 Units, Intravenous, 1 time in Clinic/HOD, Richa Bahena MD    sodium chloride 0.9% flush 10 mL, 10 mL, Intravenous, 1 time in Clinic/HOD, Richa Bahena MD  Allergies  Review of patient's allergies indicates:  No Known Allergies  Review of Systems  Review of Systems   Constitutional: Negative for activity change, appetite change, chills, fatigue, fever and unexpected weight change.   HENT: Negative for congestion, hearing loss, nosebleeds, sinus pressure, sinus pain and trouble swallowing.    Eyes: Negative for pain, discharge, itching and visual disturbance.   Respiratory: Negative for cough, chest tightness and shortness of breath.    Cardiovascular: Negative for chest pain, palpitations and leg swelling.   Gastrointestinal: Negative for abdominal distention, abdominal pain, blood in stool, constipation, diarrhea, nausea, rectal pain and vomiting. " "  Endocrine: Negative for heat intolerance and polydipsia.   Genitourinary: Negative for difficulty urinating, dysuria, flank pain, frequency, hematuria and urgency.   Musculoskeletal: Negative for arthralgias, back pain and neck pain.   Skin: Negative for color change, pallor and rash.   Neurological: Negative for dizziness, numbness and headaches.   Hematological: Negative for adenopathy. Does not bruise/bleed easily.   Psychiatric/Behavioral: Negative for confusion, decreased concentration, dysphoric mood and sleep disturbance. The patient is not nervous/anxious.         Objective     Objective:     Vitals:    06/16/20 1415   BP: 124/68   BP Location: Left arm   Patient Position: Sitting   BP Method: Large (Automatic)   Pulse: 77   Resp: 16   Temp: 98 °F (36.7 °C)   TempSrc: Oral   SpO2: 97%   Weight: (!) 184.5 kg (406 lb 12 oz)   Height: 6' 2" (1.88 m)     Physical Exam  Constitutional:       General: He is not in acute distress.     Appearance: He is well-developed. He is obese. He is not diaphoretic.   Eyes:      General: No scleral icterus.     Conjunctiva/sclera: Conjunctivae normal.   Neck:      Musculoskeletal: Neck supple.   Cardiovascular:      Rate and Rhythm: Normal rate and regular rhythm.      Heart sounds: Normal heart sounds. No murmur.   Pulmonary:      Effort: Pulmonary effort is normal. No respiratory distress.      Breath sounds: Normal breath sounds. No wheezing.   Abdominal:      General: Bowel sounds are normal. There is no distension.      Palpations: Abdomen is soft.      Tenderness: There is no abdominal tenderness.   Musculoskeletal: Normal range of motion.      Comments: Right UE edema   Skin:     General: Skin is warm and dry.      Findings: No rash.   Neurological:      Mental Status: He is alert and oriented to person, place, and time.   Psychiatric:         Behavior: Behavior normal.       Labs and Imaging  Results for orders placed or performed in visit on 06/12/20   CBC Oncology "   Result Value Ref Range    WBC 3.50 (L) 3.90 - 12.70 K/uL    RBC 4.93 4.60 - 6.20 M/uL    Hemoglobin 12.6 (L) 14.0 - 18.0 g/dL    Hematocrit 41.4 40.0 - 54.0 %    Mean Corpuscular Volume 84 82 - 98 fL    Mean Corpuscular Hemoglobin 25.6 (L) 27.0 - 31.0 pg    Mean Corpuscular Hemoglobin Conc 30.4 (L) 32.0 - 36.0 g/dL    RDW 17.5 (H) 11.5 - 14.5 %    Platelets 158 150 - 350 K/uL    MPV 10.2 9.2 - 12.9 fL    Gran # (ANC) 2.0 1.8 - 7.7 K/uL    Immature Grans (Abs) 0.01 0.00 - 0.04 K/uL   Comprehensive metabolic panel   Result Value Ref Range    Sodium 142 136 - 145 mmol/L    Potassium 4.3 3.5 - 5.1 mmol/L    Chloride 106 95 - 110 mmol/L    CO2 28 23 - 29 mmol/L    Glucose 104 70 - 110 mg/dL    BUN, Bld 17 6 - 20 mg/dL    Creatinine 1.3 0.5 - 1.4 mg/dL    Calcium 9.0 8.7 - 10.5 mg/dL    Total Protein 7.5 6.0 - 8.4 g/dL    Albumin 3.9 3.5 - 5.2 g/dL    Total Bilirubin 0.7 0.1 - 1.0 mg/dL    Alkaline Phosphatase 82 55 - 135 U/L    AST 21 10 - 40 U/L    ALT 31 10 - 44 U/L    Anion Gap 8 8 - 16 mmol/L    eGFR if African American >60.0 >60 mL/min/1.73 m^2    eGFR if non African American >60.0 >60 mL/min/1.73 m^2       Assessment     Assessment:     1. Malignant neoplasm involving both nipple and areola of right breast in male, estrogen receptor negative    2. Leukopenia due to antineoplastic chemotherapy    3. Chemotherapy-induced neutropenia    4. Microcytic anemia    5. Encounter for long-term (current) use of high-risk medication        1. Stage IB ([id number]) invasive ductal carcinoma with lobular features of right breast, retroareolar, ER neg, OK neg, Her2 neg, Grade 2, Ki67 80%, diagnosed 5/2019   * Genetics negative   * 5/27/19 - 10/8/19 completed neoadjuvant ddAC followed by weekly Taxol.    * On 11/22/19 Dr Whyte performed a right breast mastectomy with SLN biopsy with pathology showing grade 2, 6 mm IDC with extensive treatment effect, no LVI with 1 LN positive 4 mm, negative margins. The on 12/17/19, Dr Whyte  performed right axillary dissection with pathology no remaining malignancy.   * 2/3/2020 - 3/12/2020 completed RT   * 4/15/2020 start (once arrives) Xeloda 1000 mg/m2 bid days 1-14 every 21 days for 6-8 cycles depending on tolerance.    - Cycle 3 will start 6/29. See me 7/20 for cycle 4    2. Microcytic anemia   * Has anemia as baseline but worsened with chemotherapy   * improving.    3. Leukopenia   * Mild, monitor.     4. HTN and Diabetes per PCP; diabetes improving - off insulin.     5.Chemo neuropathy   * Gabapentin 300 mg tid. Improving.     6. Lymphedema   * Wrapped  Plan:     1. Continue Xeloda 1000 mg/m2 days 1-14 every 21 days for 6-8 cycles.    - Cycle 3 will start 6/29. See me 7/20 for cycle 4  2.  eval after Xeloda  3. Continue gabapentin 300 mg tid.   4. Diabetes management  5. Port management.     Future Appointments   Date Time Provider Department Center   7/6/2020 10:00 AM Shima Whyte MD [facility]

## 2020-06-27 DIAGNOSIS — N52.8 OTHER MALE ERECTILE DYSFUNCTION: ICD-10-CM

## 2020-06-27 RX ORDER — TADALAFIL 5 MG/1
10 TABLET ORAL DAILY PRN
Qty: 20 TABLET | Refills: 1 | Status: CANCELLED | OUTPATIENT
Start: 2020-06-27 | End: 2021-06-27

## 2020-06-29 RX ORDER — TADALAFIL 5 MG/1
10 TABLET ORAL DAILY PRN
Qty: 20 TABLET | Refills: 1 | Status: SHIPPED | OUTPATIENT
Start: 2020-06-29 | End: 2021-01-29 | Stop reason: SDUPTHER

## 2020-07-02 ENCOUNTER — PATIENT OUTREACH (OUTPATIENT)
Dept: ADMINISTRATIVE | Facility: OTHER | Age: 45
End: 2020-07-02

## 2020-07-05 NOTE — PROGRESS NOTES
REFERRING PHYSICIAN:  No referring provider defined for this encounter.       Kareem Arroyo MD    MEDICAL ONCOLOGIST:    Richa Bahena MD  RADIATION ONCOLOGIST:   Speedy Nair Jr., MD    DIAGNOSIS:    This is a 44 y.o. male with a stage pT1c N0 M0 grade 2 ER - HI - HER2 - invasive ductal carcinoma with lobular features of the right breast.    TREATMENT SUMMARY:  The patient is status post neoadjuvant chemotherapy (completed 10/8/2019) and subsequent right simple mastectomy and sentinel node biopsy on 11/22/19.  Final pathology 6 mm residual IDC, also 1/4 LN positive with 4mm met. Underwent an axillary dissection 12/17/19. Completed XRT on 3/23/20. On Xeloda since 02/20.    Pathology:   1. AXILLARY CONTENTS, RIGHT, EXCISION:  - Fourteen lymph node candidates, negative for metastatic carcinoma (0/14).  - Benign soft tissue with fat necrosis, previous procedure site changes, and scar.  - Negative for malignancy.  2. AXILLARY SKIN, RIGHT, EXCISION:  - Benign skin with fat necrosis, previous procedure site changes, and scar.  - Negative for malignancy.      INTERVAL HISTORY:   07/06/2020  Doing well.  Lymphedema controlled pretty well.  Denies headache, chest pain, SOB, cough.     1/13/20  Paul Clintonshilpa Michael comes in for a post-op check.  He denies fever, chills, chest pain or shortness of breath.  His pain is well controlled.   Drain output:   1/13/20: 25cc  1/12/20: 30cc  1/11/20: 30cc      MEDICATIONS:  Current Outpatient Medications   Medication Sig Dispense Refill    amLODIPine (NORVASC) 10 MG tablet TAKE 1 TABLET (10 MG) BY MOUTH EVERY DAY (Patient taking differently: Take 10 mg by mouth every morning. ) 30 tablet 10    atorvastatin (LIPITOR) 20 MG tablet Take 1 tablet (20 mg total) by mouth once daily. 90 tablet 3    blood sugar diagnostic Strp To check BG 4 times daily, to use with insurance preferred meter 400 each 3    blood-glucose meter kit Use as instructed 1 each 0    capecitabine (XELODA) 500 MG  "Tab Take 6 tablets (3,000 mg total) by mouth 2 (two) times daily Days 1-14 every 21 days. 168 tablet 6    dulaglutide (TRULICITY) 1.5 mg/0.5 mL PnIj Inject 1.5 mg into the skin once a week. Trulicity 0.75mg weekly for 4 weeks & then increase to 1.5 mg weekly indefinitely. 6 mL 3    gabapentin (NEURONTIN) 300 MG capsule Take 1 capsule (300 mg total) by mouth 3 (three) times daily. 90 capsule 11    hydroCHLOROthiazide (HYDRODIURIL) 25 MG tablet TAKE 1 TABLET BY MOUTH ONCE DAILY 90 tablet 3    lancets 33 gauge Misc Use 2 (two) times daily with meals. 100 each 11    lancets 33 gauge Misc To check BG 4 times daily, to use with insurance preferred meter 200 each 11    lancing device Misc 1 Device by Misc.(Non-Drug; Combo Route) route 2 (two) times daily with meals. 1 each 0    lidocaine-prilocaine (EMLA) cream Apply topically to affected area 45 minutes before port access 30 g 1    losartan (COZAAR) 50 MG tablet Take 2 tablets by mouth once daily 210 tablet 0    metFORMIN (GLUCOPHAGE) 500 MG tablet Take 2 tablets (1,000 mg total) by mouth 2 (two) times daily with meals. 90 tablet 3    ondansetron (ZOFRAN) 8 MG tablet Take 1 tablet (8 mg total) by mouth every 8 (eight) hours as needed for Nausea. 30 tablet 2    ondansetron (ZOFRAN-ODT) 8 MG TbDL Take 1 tablet (8 mg total) by mouth every 12 (twelve) hours as needed. 20 tablet 1    pen needle, diabetic (BD ULTRA-FINE TANESHA PEN NEEDLE) 32 gauge x 5/32" Ndle Use as directed with novolog and levemir 100 each 5    senna (SENOKOT) 8.6 mg tablet Take 1 tablet by mouth once daily.      tadalafiL (CIALIS) 5 MG tablet Take 2 tablets (10 mg total) by mouth daily as needed for Erectile Dysfunction. 20 tablet 1     No current facility-administered medications for this visit.      Facility-Administered Medications Ordered in Other Visits   Medication Dose Route Frequency Provider Last Rate Last Dose    heparin, porcine (PF) 100 unit/mL injection flush 500 Units  500 Units " Intravenous 1 time in Clinic/HOD Richa Bahena MD        sodium chloride 0.9% flush 10 mL  10 mL Intravenous 1 time in Clinic/HOD Richa Bahena MD           ALLERGIES:   Review of patient's allergies indicates:  No Known Allergies    PHYSICAL EXAMINATION:   General:  This is a well appearing male with appropriate speech, affect and gait.     Breast:  Incision clean, dry, and intact. No surrounding erythema or drainage from incision.  Some edema in the skin and edema of the right arm. No other masses, no LAD bilaterally. There is a little skin thickening from XRT.    IMPRESSION:   ISABEL     PLAN:   Continue treatment with DR. bahena  Discussed option of screening MMG on the the right, disussed not in guidelines but he does have a significant amount of breast tissue on that side.  F/u 6 months  Try leg compression garment for the left arm.

## 2020-07-06 ENCOUNTER — OFFICE VISIT (OUTPATIENT)
Dept: SURGERY | Facility: CLINIC | Age: 45
End: 2020-07-06

## 2020-07-06 ENCOUNTER — HOSPITAL ENCOUNTER (OUTPATIENT)
Dept: RADIOLOGY | Facility: HOSPITAL | Age: 45
Discharge: HOME | End: 2020-07-06
Attending: SURGERY

## 2020-07-06 VITALS — HEIGHT: 74 IN | BODY MASS INDEX: 40.43 KG/M2 | WEIGHT: 315 LBS

## 2020-07-06 DIAGNOSIS — Z12.31 SCREENING MAMMOGRAM, ENCOUNTER FOR: ICD-10-CM

## 2020-07-06 DIAGNOSIS — Z85.3 PERSONAL HISTORY OF BREAST CANCER: ICD-10-CM

## 2020-07-06 DIAGNOSIS — I89.0 LYMPHEDEMA OF RIGHT UPPER EXTREMITY: ICD-10-CM

## 2020-07-06 DIAGNOSIS — Z85.3 PERSONAL HISTORY OF BREAST CANCER: Primary | ICD-10-CM

## 2020-07-06 PROCEDURE — 99213 PR OFFICE/OUTPT VISIT, EST, LEVL III, 20-29 MIN: ICD-10-PCS | Mod: S$PBB,,, | Performed by: SURGERY

## 2020-07-06 PROCEDURE — 77062 MAMMO DIGITAL DIAGNOSTIC BILAT WITH TOMOSYNTHESIS_CAD: ICD-10-PCS | Mod: 26,,, | Performed by: RADIOLOGY

## 2020-07-06 PROCEDURE — 77062 BREAST TOMOSYNTHESIS BI: CPT | Mod: TC,PO

## 2020-07-06 PROCEDURE — 77066 MAMMO DIGITAL DIAGNOSTIC BILAT WITH TOMOSYNTHESIS_CAD: ICD-10-PCS | Mod: 26,,, | Performed by: RADIOLOGY

## 2020-07-06 PROCEDURE — 77066 DX MAMMO INCL CAD BI: CPT | Mod: 26,,, | Performed by: RADIOLOGY

## 2020-07-06 PROCEDURE — 99999 PR PBB SHADOW E&M-EST. PATIENT-LVL IV: CPT | Mod: PBBFAC,,, | Performed by: SURGERY

## 2020-07-06 PROCEDURE — 77062 BREAST TOMOSYNTHESIS BI: CPT | Mod: 26,,, | Performed by: RADIOLOGY

## 2020-07-06 PROCEDURE — 77067 SCR MAMMO BI INCL CAD: CPT | Mod: TC,PO

## 2020-07-06 PROCEDURE — 99214 OFFICE O/P EST MOD 30 MIN: CPT | Mod: PBBFAC | Performed by: SURGERY

## 2020-07-06 PROCEDURE — 99999 PR PBB SHADOW E&M-EST. PATIENT-LVL IV: ICD-10-PCS | Mod: PBBFAC,,, | Performed by: SURGERY

## 2020-07-06 PROCEDURE — 99213 OFFICE O/P EST LOW 20 MIN: CPT | Mod: S$PBB,,, | Performed by: SURGERY

## 2020-07-20 ENCOUNTER — LAB VISIT (OUTPATIENT)
Dept: LAB | Facility: HOSPITAL | Age: 45
End: 2020-07-20
Attending: INTERNAL MEDICINE

## 2020-07-20 ENCOUNTER — OFFICE VISIT (OUTPATIENT)
Dept: HEMATOLOGY/ONCOLOGY | Facility: CLINIC | Age: 45
End: 2020-07-20

## 2020-07-20 VITALS
SYSTOLIC BLOOD PRESSURE: 110 MMHG | RESPIRATION RATE: 16 BRPM | BODY MASS INDEX: 40.43 KG/M2 | WEIGHT: 315 LBS | HEART RATE: 72 BPM | OXYGEN SATURATION: 98 % | HEIGHT: 74 IN | DIASTOLIC BLOOD PRESSURE: 61 MMHG

## 2020-07-20 DIAGNOSIS — Z17.1 MALIGNANT NEOPLASM INVOLVING BOTH NIPPLE AND AREOLA OF RIGHT BREAST IN FEMALE, ESTROGEN RECEPTOR NEGATIVE: ICD-10-CM

## 2020-07-20 DIAGNOSIS — T45.1X5A LEUKOPENIA DUE TO ANTINEOPLASTIC CHEMOTHERAPY: ICD-10-CM

## 2020-07-20 DIAGNOSIS — D50.9 MICROCYTIC ANEMIA: ICD-10-CM

## 2020-07-20 DIAGNOSIS — C50.011 MALIGNANT NEOPLASM INVOLVING BOTH NIPPLE AND AREOLA OF RIGHT BREAST IN FEMALE, ESTROGEN RECEPTOR NEGATIVE: ICD-10-CM

## 2020-07-20 DIAGNOSIS — D70.1 CHEMOTHERAPY-INDUCED NEUTROPENIA: ICD-10-CM

## 2020-07-20 DIAGNOSIS — Z17.1 MALIGNANT NEOPLASM INVOLVING BOTH NIPPLE AND AREOLA OF RIGHT BREAST IN MALE, ESTROGEN RECEPTOR NEGATIVE: Primary | ICD-10-CM

## 2020-07-20 DIAGNOSIS — E11.9 TYPE 2 DIABETES MELLITUS WITHOUT COMPLICATION, WITHOUT LONG-TERM CURRENT USE OF INSULIN: ICD-10-CM

## 2020-07-20 DIAGNOSIS — T45.1X5A CHEMOTHERAPY-INDUCED NEUTROPENIA: ICD-10-CM

## 2020-07-20 DIAGNOSIS — E66.01 CLASS 3 SEVERE OBESITY DUE TO EXCESS CALORIES WITH SERIOUS COMORBIDITY AND BODY MASS INDEX (BMI) OF 50.0 TO 59.9 IN ADULT: ICD-10-CM

## 2020-07-20 DIAGNOSIS — Z79.899 ENCOUNTER FOR LONG-TERM (CURRENT) USE OF HIGH-RISK MEDICATION: ICD-10-CM

## 2020-07-20 DIAGNOSIS — C50.021 MALIGNANT NEOPLASM INVOLVING BOTH NIPPLE AND AREOLA OF RIGHT BREAST IN MALE, ESTROGEN RECEPTOR NEGATIVE: Primary | ICD-10-CM

## 2020-07-20 DIAGNOSIS — D70.1 LEUKOPENIA DUE TO ANTINEOPLASTIC CHEMOTHERAPY: ICD-10-CM

## 2020-07-20 LAB
ALBUMIN SERPL BCP-MCNC: 3.7 G/DL (ref 3.5–5.2)
ALP SERPL-CCNC: 65 U/L (ref 55–135)
ALT SERPL W/O P-5'-P-CCNC: 35 U/L (ref 10–44)
ANION GAP SERPL CALC-SCNC: 7 MMOL/L (ref 8–16)
AST SERPL-CCNC: 25 U/L (ref 10–40)
BILIRUB SERPL-MCNC: 0.8 MG/DL (ref 0.1–1)
BUN SERPL-MCNC: 9 MG/DL (ref 6–20)
CALCIUM SERPL-MCNC: 9.1 MG/DL (ref 8.7–10.5)
CHLORIDE SERPL-SCNC: 107 MMOL/L (ref 95–110)
CO2 SERPL-SCNC: 26 MMOL/L (ref 23–29)
CREAT SERPL-MCNC: 1.2 MG/DL (ref 0.5–1.4)
ERYTHROCYTE [DISTWIDTH] IN BLOOD BY AUTOMATED COUNT: 17.7 % (ref 11.5–14.5)
EST. GFR  (AFRICAN AMERICAN): >60 ML/MIN/1.73 M^2
EST. GFR  (NON AFRICAN AMERICAN): >60 ML/MIN/1.73 M^2
GLUCOSE SERPL-MCNC: 102 MG/DL (ref 70–110)
HCT VFR BLD AUTO: 39.3 % (ref 40–54)
HGB BLD-MCNC: 11.8 G/DL (ref 14–18)
IMM GRANULOCYTES # BLD AUTO: 0.01 K/UL (ref 0–0.04)
MCH RBC QN AUTO: 25.4 PG (ref 27–31)
MCHC RBC AUTO-ENTMCNC: 30 G/DL (ref 32–36)
MCV RBC AUTO: 85 FL (ref 82–98)
NEUTROPHILS # BLD AUTO: 1.8 K/UL (ref 1.8–7.7)
PLATELET # BLD AUTO: 211 K/UL (ref 150–350)
PMV BLD AUTO: 10.5 FL (ref 9.2–12.9)
POTASSIUM SERPL-SCNC: 4.1 MMOL/L (ref 3.5–5.1)
PROT SERPL-MCNC: 7.2 G/DL (ref 6–8.4)
RBC # BLD AUTO: 4.64 M/UL (ref 4.6–6.2)
SODIUM SERPL-SCNC: 140 MMOL/L (ref 136–145)
WBC # BLD AUTO: 3.45 K/UL (ref 3.9–12.7)

## 2020-07-20 PROCEDURE — 99214 OFFICE O/P EST MOD 30 MIN: CPT | Mod: PBBFAC | Performed by: INTERNAL MEDICINE

## 2020-07-20 PROCEDURE — 99215 PR OFFICE/OUTPT VISIT, EST, LEVL V, 40-54 MIN: ICD-10-PCS | Mod: S$PBB,,, | Performed by: INTERNAL MEDICINE

## 2020-07-20 PROCEDURE — 99999 PR PBB SHADOW E&M-EST. PATIENT-LVL IV: ICD-10-PCS | Mod: PBBFAC,,, | Performed by: INTERNAL MEDICINE

## 2020-07-20 PROCEDURE — 99215 OFFICE O/P EST HI 40 MIN: CPT | Mod: S$PBB,,, | Performed by: INTERNAL MEDICINE

## 2020-07-20 PROCEDURE — 99999 PR PBB SHADOW E&M-EST. PATIENT-LVL IV: CPT | Mod: PBBFAC,,, | Performed by: INTERNAL MEDICINE

## 2020-07-20 PROCEDURE — 80053 COMPREHEN METABOLIC PANEL: CPT

## 2020-07-20 PROCEDURE — 36415 COLL VENOUS BLD VENIPUNCTURE: CPT

## 2020-07-20 PROCEDURE — 85027 COMPLETE CBC AUTOMATED: CPT

## 2020-07-20 NOTE — PROGRESS NOTES
History:     Reason For Follow Up:   1. Stage IB ([id number]) triple negative invasive ductal carcinoma with lobular features of right breast, retroareolar, Grade 2, Ki67 80%, diagnosed 5/2019    HPI:   Paul Li Jr. presents for follow up of breast cancer. He remains on Xeloda. Dry skin on hands and feet. Toes nails changing. Still with mild cough after radiation - waxes and wanes. Still exercising and trying to lose weight. IN the middle of a cycle since last delivery was delayed - will end this Sunday.     Onc History:  Oncology History   Malignant neoplasm involving both nipple and areola of right breast in male, estrogen receptor negative   5/1/2019 Imaging Significant Findings    Mammogram and US  Impression:  Right  Mass: Right breast 14 mm mass at the retroareolar anterior position. Assessment: 4 - Suspicious finding. Biopsy is recommended.      Left  There is no mammographic or sonographic evidence of malignancy.          Recommendation:  Biopsy is recommended.     5/2/2019 Biopsy    BREAST, RIGHT, RETROAREOLAR MASS, ULTRASOUND-GUIDED BIOPSY:  Invasive ductal carcinoma with lobular features.  Size of invasive carcinoma: 5 MM in greatest linear dimension within a single     5/2/2019 Breast Tumor Markers    Estrogen: Negative  Progesterone: Negative  HER2: Negative  Ki67: 80     5/2/2019 Cancer Staged    Staging form: Breast, AJCC 8th Edition  - Clinical stage from 5/17/2019: Stage IB (cT1c, cN0, cM0, G2, ER-, FL-, HER2-) - Signed by Richa Bahena MD on 5/17/2019 5/27/2019 - 7/21/2019 Chemotherapy    Treatment Summary   Plan Name: OP BREAST DOSE-DENSE AC - DOXORUBICIN CYCLOPHOSPHAMIDE Q2W  Treatment Goal: Curative  Status: Inactive  Start Date: 5/27/2019  End Date: 7/9/2019  Provider: Richa Bahena MD  Chemotherapy: DOXOrubicin chemo injection 186 mg, 60 mg/m2 = 186 mg, Intravenous, Clinic/HOD 1 time, 4 of 4 cycles  Administration: 186 mg (5/27/2019), 186 mg (6/10/2019), 186 mg (6/24/2019), 186  mg (7/8/2019)  cyclophosphamide (CYTOXAN) 600 mg/m2 = 1,865 mg in sodium chloride 0.9% 250 mL chemo infusion, 600 mg/m2 = 1,865 mg, Intravenous, Clinic/HOD 1 time, 4 of 4 cycles  Administration: 1,865 mg (5/27/2019), 1,865 mg (6/10/2019), 1,865 mg (6/24/2019), 1,865 mg (7/8/2019)     7/22/2019 - 10/15/2019 Chemotherapy    Treatment Summary   Plan Name: OP PACLITAXEL QW  Treatment Goal: Curative  Status: Inactive  Start Date: 7/24/2019  End Date: 10/8/2019  Provider: Richa Bahena MD  Chemotherapy: PACLitaxel (TAXOL) 80 mg/m2 = 246 mg in sodium chloride 0.9% 250 mL chemo infusion, 80 mg/m2 = 246 mg, Intravenous, Clinic/HOD 1 time, 12 of 12 cycles  Dose modification: 60 mg/m2 (original dose 80 mg/m2, Cycle 3), 55 mg/m2 (original dose 80 mg/m2, Cycle 7), 60 mg/m2 (original dose 80 mg/m2, Cycle 7), 50 mg/m2 (original dose 80 mg/m2, Cycle 9), 50 mg/m2 (original dose 80 mg/m2, Cycle 10)  Administration: 246 mg (7/24/2019), 246 mg (7/31/2019), 186 mg (8/7/2019), 186 mg (8/14/2019), 186 mg (8/21/2019), 186 mg (8/28/2019), 186 mg (9/4/2019), 174 mg (9/11/2019), 156 mg (9/18/2019), 156 mg (9/25/2019), 156 mg (10/1/2019), 156 mg (10/8/2019)     11/22/2019 Breast Surgery    On 11/22/19 Dr whyte performed a right breast mastectomy with SLN biopsy with pathology showing grade 2, 6 mm IDC with extensive treatment effect, no LVI with 1 LN positive 4 mm, negative margins. The on 12/17/19, Dr Whyte performed right axillary dissection with pathology still pending.     11/22/2019 Cancer Staged    ypT1b N1     12/17/2019 Breast Surgery    Surgery: Dr Shima Whyte, [phone] performed right ALND with pathology showing 14 negative lymph nodes.          1/14/2020 Tumor Conference       Post mastectomy radiation therapy: Xeloda, then possible Keytruda trial .     2/3/2020 - 3/23/2020 Radiation Therapy    Treating physician: Speedy Nair MD   Total Dose: 50.4 Gy to the chest wall and regional lymphatics  10 Gy boost to the  prostate.   Fractions: 28 fractions at 1.8 Gy per fraction  5 fractions at 2 Gy per fraction      2/28/2020 -  Chemotherapy    Treatment Summary   Plan Name: OP CAPECITABINE 1250MG/M2 BID Q3W  Treatment Goal: Curative  Status: Active  Start Date: 3/20/2020  End Date: 3/20/2020  Provider: Richa Bahena MD  Chemotherapy: [No matching medication found in this treatment plan]           Medications    Current Outpatient Medications:     amLODIPine (NORVASC) 10 MG tablet, TAKE 1 TABLET (10 MG) BY MOUTH EVERY DAY (Patient taking differently: Take 10 mg by mouth every morning. ), Disp: 30 tablet, Rfl: 10    atorvastatin (LIPITOR) 20 MG tablet, Take 1 tablet (20 mg total) by mouth once daily., Disp: 90 tablet, Rfl: 3    blood sugar diagnostic Strp, To check BG 4 times daily, to use with insurance preferred meter, Disp: 400 each, Rfl: 3    blood-glucose meter kit, Use as instructed, Disp: 1 each, Rfl: 0    capecitabine (XELODA) 500 MG Tab, Take 6 tablets (3,000 mg total) by mouth 2 (two) times daily Days 1-14 every 21 days., Disp: 168 tablet, Rfl: 6    dulaglutide (TRULICITY) 1.5 mg/0.5 mL PnIj, Inject 1.5 mg into the skin once a week. Trulicity 0.75mg weekly for 4 weeks & then increase to 1.5 mg weekly indefinitely., Disp: 6 mL, Rfl: 3    gabapentin (NEURONTIN) 300 MG capsule, Take 1 capsule (300 mg total) by mouth 3 (three) times daily., Disp: 90 capsule, Rfl: 11    hydroCHLOROthiazide (HYDRODIURIL) 25 MG tablet, TAKE 1 TABLET BY MOUTH ONCE DAILY, Disp: 90 tablet, Rfl: 3    lancets 33 gauge Misc, Use 2 (two) times daily with meals., Disp: 100 each, Rfl: 11    lancets 33 gauge Misc, To check BG 4 times daily, to use with insurance preferred meter, Disp: 200 each, Rfl: 11    lancing device Misc, 1 Device by Misc.(Non-Drug; Combo Route) route 2 (two) times daily with meals., Disp: 1 each, Rfl: 0    lidocaine-prilocaine (EMLA) cream, Apply topically to affected area 45 minutes before port access, Disp: 30 g, Rfl:  "1    losartan (COZAAR) 50 MG tablet, Take 2 tablets by mouth once daily, Disp: 210 tablet, Rfl: 0    metFORMIN (GLUCOPHAGE) 500 MG tablet, Take 2 tablets (1,000 mg total) by mouth 2 (two) times daily with meals., Disp: 90 tablet, Rfl: 3    ondansetron (ZOFRAN) 8 MG tablet, Take 1 tablet (8 mg total) by mouth every 8 (eight) hours as needed for Nausea., Disp: 30 tablet, Rfl: 2    ondansetron (ZOFRAN-ODT) 8 MG TbDL, Take 1 tablet (8 mg total) by mouth every 12 (twelve) hours as needed., Disp: 20 tablet, Rfl: 1    pen needle, diabetic (BD ULTRA-FINE TANESHA PEN NEEDLE) 32 gauge x 5/32" Ndle, Use as directed with novolog and levemir, Disp: 100 each, Rfl: 5    senna (SENOKOT) 8.6 mg tablet, Take 1 tablet by mouth once daily., Disp: , Rfl:     tadalafiL (CIALIS) 5 MG tablet, Take 2 tablets (10 mg total) by mouth daily as needed for Erectile Dysfunction., Disp: 20 tablet, Rfl: 1  No current facility-administered medications for this visit.     Facility-Administered Medications Ordered in Other Visits:     heparin, porcine (PF) 100 unit/mL injection flush 500 Units, 500 Units, Intravenous, 1 time in Clinic/HOD, Richa Bahena MD    sodium chloride 0.9% flush 10 mL, 10 mL, Intravenous, 1 time in Clinic/HOD, Richa Bahena MD  Allergies  Review of patient's allergies indicates:  No Known Allergies  Review of Systems  Review of Systems   Constitutional: Negative for activity change, appetite change, chills, fatigue, fever and unexpected weight change.   HENT: Negative for congestion, hearing loss, nosebleeds, sinus pressure, sinus pain and trouble swallowing.    Eyes: Negative for pain, discharge, itching and visual disturbance.   Respiratory: Negative for cough, chest tightness and shortness of breath.    Cardiovascular: Negative for chest pain, palpitations and leg swelling.   Gastrointestinal: Negative for abdominal distention, abdominal pain, blood in stool, constipation, diarrhea, nausea, rectal pain and " "vomiting.   Endocrine: Negative for heat intolerance and polydipsia.   Genitourinary: Negative for difficulty urinating, dysuria, flank pain, frequency, hematuria and urgency.   Musculoskeletal: Negative for arthralgias, back pain and neck pain.   Skin: Negative for color change, pallor and rash.   Neurological: Negative for dizziness, numbness and headaches.   Hematological: Negative for adenopathy. Does not bruise/bleed easily.   Psychiatric/Behavioral: Negative for confusion, decreased concentration, dysphoric mood and sleep disturbance. The patient is not nervous/anxious.         Objective     Objective:     Vitals:    07/20/20 0821   BP: 110/61   BP Location: Left arm   Patient Position: Sitting   BP Method: Large (Automatic)   Pulse: 72   Resp: 16   SpO2: 98%   Weight: (!) 182.8 kg (403 lb)   Height: 6' 2" (1.88 m)     Physical Exam  Constitutional:       General: He is not in acute distress.     Appearance: He is well-developed. He is obese. He is not diaphoretic.   Eyes:      General: No scleral icterus.     Conjunctiva/sclera: Conjunctivae normal.   Neck:      Musculoskeletal: Neck supple.   Cardiovascular:      Rate and Rhythm: Normal rate and regular rhythm.      Heart sounds: Normal heart sounds. No murmur.   Pulmonary:      Effort: Pulmonary effort is normal. No respiratory distress.      Breath sounds: Normal breath sounds. No wheezing.      Comments: S/p right mastectomy with post-radiation hyperpigmentation. Left mediport  Abdominal:      General: Bowel sounds are normal. There is no distension.      Palpations: Abdomen is soft.      Tenderness: There is no abdominal tenderness.   Musculoskeletal: Normal range of motion.      Comments: Right UE edema with ace bandage   Skin:     General: Skin is warm and dry.      Findings: No rash.   Neurological:      Mental Status: He is alert and oriented to person, place, and time.   Psychiatric:         Behavior: Behavior normal.       Labs and Imaging  Results " for orders placed or performed in visit on 07/20/20   CBC Oncology   Result Value Ref Range    WBC 3.45 (L) 3.90 - 12.70 K/uL    RBC 4.64 4.60 - 6.20 M/uL    Hemoglobin 11.8 (L) 14.0 - 18.0 g/dL    Hematocrit 39.3 (L) 40.0 - 54.0 %    Mean Corpuscular Volume 85 82 - 98 fL    Mean Corpuscular Hemoglobin 25.4 (L) 27.0 - 31.0 pg    Mean Corpuscular Hemoglobin Conc 30.0 (L) 32.0 - 36.0 g/dL    RDW 17.7 (H) 11.5 - 14.5 %    Platelets 211 150 - 350 K/uL    MPV 10.5 9.2 - 12.9 fL    Gran # (ANC) 1.8 1.8 - 7.7 K/uL    Immature Grans (Abs) 0.01 0.00 - 0.04 K/uL     Mammo Digital Diagnostic Bilat w/ Paul  Narrative: Result:  Mammo Digital Diagnostic Bilat w/ Paul    History:  History of right breast cancer, post right mastectomy. Screening for the   left breast.     Films Compared:  Prior images (if available) were compared.    Findings:  This procedure was performed using tomosynthesis. Computer-aided detection   was utilized in the interpretation of this examination.     The breasts are almost entirely fatty. There is no evidence of suspicious   masses, microcalcifications or architectural distortion.    No suspicious finding on either side. Right mastectomy and post-treatment   changes.  Impression: No mammographic evidence of malignancy.    BI-RADS Category 1: Negative    Recommendation:  Follow-up, as clinically indicated.        Assessment     Assessment:     1. Malignant neoplasm involving both nipple and areola of right breast in male, estrogen receptor negative    2. Chemotherapy-induced neutropenia    3. Microcytic anemia    4. Leukopenia due to antineoplastic chemotherapy    5. Type 2 diabetes mellitus without complication, without long-term current use of insulin    6. Class 3 severe obesity due to excess calories with serious comorbidity and body mass index (BMI) of 50.0 to 59.9 in adult    7. Encounter for long-term (current) use of high-risk medication        1. Stage IB ([id number]) invasive ductal carcinoma with  lobular features of right breast, retroareolar, ER neg, ME neg, Her2 neg, Grade 2, Ki67 80%, diagnosed 5/2019   * Genetics negative   * 5/27/19 - 10/8/19 completed neoadjuvant ddAC followed by weekly Taxol.    * On 11/22/19 Dr Whyte performed a right breast mastectomy with SLN biopsy with pathology showing grade 2, 6 mm IDC with extensive treatment effect, no LVI with 1 LN positive 4 mm, negative margins. The on 12/17/19, Dr Whyte performed right axillary dissection with pathology no remaining malignancy.   * 2/3/2020 - 3/12/2020 completed RT   * 4/15/2020 start (once arrives) Xeloda 1000 mg/m2 bid days 1-14 every 21 days for 6-8 cycles depending on tolerance.    - Tolerating well.     2. Microcytic anemia   * Has anemia as baseline but worsened with chemotherapy   * improving.    3. Leukopenia   * Mild, monitor.     4. HTN and Diabetes per PCP; diabetes improving - off insulin.     5.Chemo neuropathy   * Gabapentin 300 mg tid. Improving.     6. Lymphedema   * Wrapped  Plan:     1. Continue Xeloda 1000 mg/m2 days 1-14 every 21 days for 6-8 cycles.    - Cycle 4 will start 8/3. Had delay of delivery meds for cycle 4.   2.  eval after Xeloda  3. Continue gabapentin 300 mg tid.   4. Diabetes management  5. Port management. Flush in about 6 wks.     No future appointments.    See me in 2 wks with cbc, cmp to start cycle 4 and 5 wks for cycle 5.

## 2020-07-31 NOTE — PROGRESS NOTES
Dictation #1  MRN:[id number]  [facility]:[id number]    History:     Reason For Follow Up:   1. Stage IB ([id number]) triple negative invasive ductal carcinoma with lobular features of right breast, retroareolar, Grade 2, Ki67 80%, diagnosed 5/2019    HPI:   Paul Li Jr. presents for follow up of breast cancer. He remains on Xeloda. About to restart next cycle of Xeloda. He's feeling well without complaints today. He has stable lymphedema in right arm. No skin changes. Working on losing weight.     Onc History:  Oncology History   Malignant neoplasm involving both nipple and areola of right breast in male, estrogen receptor negative   5/1/2019 Imaging Significant Findings    Mammogram and US  Impression:  Right  Mass: Right breast 14 mm mass at the retroareolar anterior position. Assessment: 4 - Suspicious finding. Biopsy is recommended.      Left  There is no mammographic or sonographic evidence of malignancy.          Recommendation:  Biopsy is recommended.     5/2/2019 Biopsy    BREAST, RIGHT, RETROAREOLAR MASS, ULTRASOUND-GUIDED BIOPSY:  Invasive ductal carcinoma with lobular features.  Size of invasive carcinoma: 5 MM in greatest linear dimension within a single     5/2/2019 Breast Tumor Markers    Estrogen: Negative  Progesterone: Negative  HER2: Negative  Ki67: 80     5/2/2019 Cancer Staged    Staging form: Breast, AJCC 8th Edition  - Clinical stage from 5/17/2019: Stage IB (cT1c, cN0, cM0, G2, ER-, WV-, HER2-) - Signed by Richa Bahena MD on 5/17/2019 5/27/2019 - 7/21/2019 Chemotherapy    Treatment Summary   Plan Name: OP BREAST DOSE-DENSE AC - DOXORUBICIN CYCLOPHOSPHAMIDE Q2W  Treatment Goal: Curative  Status: Inactive  Start Date: 5/27/2019  End Date: 7/9/2019  Provider: Richa Bahena MD  Chemotherapy: DOXOrubicin chemo injection 186 mg, 60 mg/m2 = 186 mg, Intravenous, Clinic/HOD 1 time, 4 of 4 cycles  Administration: 186 mg (5/27/2019), 186 mg (6/10/2019), 186 mg (6/24/2019), 186 mg  (7/8/2019)  cyclophosphamide (CYTOXAN) 600 mg/m2 = 1,865 mg in sodium chloride 0.9% 250 mL chemo infusion, 600 mg/m2 = 1,865 mg, Intravenous, Clinic/HOD 1 time, 4 of 4 cycles  Administration: 1,865 mg (5/27/2019), 1,865 mg (6/10/2019), 1,865 mg (6/24/2019), 1,865 mg (7/8/2019)     7/22/2019 - 10/15/2019 Chemotherapy    Treatment Summary   Plan Name: OP PACLITAXEL QW  Treatment Goal: Curative  Status: Inactive  Start Date: 7/24/2019  End Date: 10/8/2019  Provider: Richa Bahena MD  Chemotherapy: PACLitaxel (TAXOL) 80 mg/m2 = 246 mg in sodium chloride 0.9% 250 mL chemo infusion, 80 mg/m2 = 246 mg, Intravenous, Clinic/HOD 1 time, 12 of 12 cycles  Dose modification: 60 mg/m2 (original dose 80 mg/m2, Cycle 3), 55 mg/m2 (original dose 80 mg/m2, Cycle 7), 60 mg/m2 (original dose 80 mg/m2, Cycle 7), 50 mg/m2 (original dose 80 mg/m2, Cycle 9), 50 mg/m2 (original dose 80 mg/m2, Cycle 10)  Administration: 246 mg (7/24/2019), 246 mg (7/31/2019), 186 mg (8/7/2019), 186 mg (8/14/2019), 186 mg (8/21/2019), 186 mg (8/28/2019), 186 mg (9/4/2019), 174 mg (9/11/2019), 156 mg (9/18/2019), 156 mg (9/25/2019), 156 mg (10/1/2019), 156 mg (10/8/2019)     11/22/2019 Breast Surgery    On 11/22/19 Dr whyte performed a right breast mastectomy with SLN biopsy with pathology showing grade 2, 6 mm IDC with extensive treatment effect, no LVI with 1 LN positive 4 mm, negative margins. The on 12/17/19, Dr Whyte performed right axillary dissection with pathology still pending.     11/22/2019 Cancer Staged    ypT1b N1     12/17/2019 Breast Surgery    Surgery: Dr Shima Whyte, [phone] performed right ALND with pathology showing 14 negative lymph nodes.          1/14/2020 Tumor Conference       Post mastectomy radiation therapy: Xeloda, then possible Keytruda trial .     2/3/2020 - 3/23/2020 Radiation Therapy    Treating physician: Speedy Nair MD   Total Dose: 50.4 Gy to the chest wall and regional lymphatics  10 Gy boost to the  prostate.   Fractions: 28 fractions at 1.8 Gy per fraction  5 fractions at 2 Gy per fraction      2/28/2020 -  Chemotherapy    Treatment Summary   Plan Name: OP CAPECITABINE 1250MG/M2 BID Q3W  Treatment Goal: Curative  Status: Active  Start Date: 3/20/2020  End Date: 3/20/2020  Provider: Richa Bahena MD  Chemotherapy: [No matching medication found in this treatment plan]           Medications    Current Outpatient Medications:     amLODIPine (NORVASC) 10 MG tablet, TAKE 1 TABLET (10 MG) BY MOUTH EVERY DAY (Patient taking differently: Take 10 mg by mouth every morning. ), Disp: 30 tablet, Rfl: 10    atorvastatin (LIPITOR) 20 MG tablet, Take 1 tablet (20 mg total) by mouth once daily., Disp: 90 tablet, Rfl: 3    blood sugar diagnostic Strp, To check BG 4 times daily, to use with insurance preferred meter, Disp: 400 each, Rfl: 3    blood-glucose meter kit, Use as instructed, Disp: 1 each, Rfl: 0    capecitabine (XELODA) 500 MG Tab, Take 6 tablets (3,000 mg total) by mouth 2 (two) times daily Days 1-14 every 21 days., Disp: 168 tablet, Rfl: 6    dulaglutide (TRULICITY) 1.5 mg/0.5 mL PnIj, Inject 1.5 mg into the skin once a week. Trulicity 0.75mg weekly for 4 weeks & then increase to 1.5 mg weekly indefinitely., Disp: 6 mL, Rfl: 3    gabapentin (NEURONTIN) 300 MG capsule, Take 1 capsule (300 mg total) by mouth 3 (three) times daily., Disp: 90 capsule, Rfl: 11    hydroCHLOROthiazide (HYDRODIURIL) 25 MG tablet, TAKE 1 TABLET BY MOUTH ONCE DAILY, Disp: 90 tablet, Rfl: 3    lancets 33 gauge Misc, Use 2 (two) times daily with meals., Disp: 100 each, Rfl: 11    lancets 33 gauge Misc, To check BG 4 times daily, to use with insurance preferred meter, Disp: 200 each, Rfl: 11    lancing device Misc, 1 Device by Misc.(Non-Drug; Combo Route) route 2 (two) times daily with meals., Disp: 1 each, Rfl: 0    lidocaine-prilocaine (EMLA) cream, Apply topically to affected area 45 minutes before port access, Disp: 30 g, Rfl:  "1    losartan (COZAAR) 50 MG tablet, Take 2 tablets by mouth once daily, Disp: 210 tablet, Rfl: 0    metFORMIN (GLUCOPHAGE) 500 MG tablet, Take 2 tablets (1,000 mg total) by mouth 2 (two) times daily with meals., Disp: 90 tablet, Rfl: 3    ondansetron (ZOFRAN) 8 MG tablet, Take 1 tablet (8 mg total) by mouth every 8 (eight) hours as needed for Nausea., Disp: 30 tablet, Rfl: 2    ondansetron (ZOFRAN-ODT) 8 MG TbDL, Take 1 tablet (8 mg total) by mouth every 12 (twelve) hours as needed., Disp: 20 tablet, Rfl: 1    pen needle, diabetic (BD ULTRA-FINE TANESHA PEN NEEDLE) 32 gauge x 5/32" Ndle, Use as directed with novolog and levemir, Disp: 100 each, Rfl: 5    senna (SENOKOT) 8.6 mg tablet, Take 1 tablet by mouth once daily., Disp: , Rfl:     tadalafiL (CIALIS) 5 MG tablet, Take 2 tablets (10 mg total) by mouth daily as needed for Erectile Dysfunction., Disp: 20 tablet, Rfl: 1  No current facility-administered medications for this visit.     Facility-Administered Medications Ordered in Other Visits:     heparin, porcine (PF) 100 unit/mL injection flush 500 Units, 500 Units, Intravenous, 1 time in Clinic/HOD, Richa Bahena MD    sodium chloride 0.9% flush 10 mL, 10 mL, Intravenous, 1 time in Clinic/HOD, Richa Bahena MD  Allergies  Review of patient's allergies indicates:  No Known Allergies  Review of Systems  Review of Systems   Constitutional: Negative for activity change, appetite change, chills, fatigue, fever and unexpected weight change.   HENT: Negative for congestion, hearing loss, nosebleeds, sinus pressure, sinus pain and trouble swallowing.    Eyes: Negative for pain, discharge, itching and visual disturbance.   Respiratory: Negative for cough, chest tightness and shortness of breath.    Cardiovascular: Negative for chest pain, palpitations and leg swelling.   Gastrointestinal: Negative for abdominal distention, abdominal pain, blood in stool, constipation, diarrhea, nausea, rectal pain and " "vomiting.   Endocrine: Negative for heat intolerance and polydipsia.   Genitourinary: Negative for difficulty urinating, dysuria, flank pain, frequency, hematuria and urgency.   Musculoskeletal: Negative for arthralgias, back pain and neck pain.   Skin: Negative for color change, pallor and rash.   Neurological: Negative for dizziness, numbness and headaches.   Hematological: Negative for adenopathy. Does not bruise/bleed easily.   Psychiatric/Behavioral: Negative for confusion, decreased concentration, dysphoric mood and sleep disturbance. The patient is not nervous/anxious.         Objective     Objective:     Vitals:    08/03/20 0805   BP: (!) 140/75   BP Location: Left arm   Patient Position: Sitting   BP Method: Large (Automatic)   Pulse: 71   Resp: 16   Temp: 98 °F (36.7 °C)   TempSrc: Oral   SpO2: 95%   Weight: (!) 180.1 kg (397 lb 0.8 oz)   Height: 6' 2" (1.88 m)     Physical Exam  Constitutional:       General: He is not in acute distress.     Appearance: He is well-developed. He is obese. He is not diaphoretic.   Eyes:      General: No scleral icterus.     Conjunctiva/sclera: Conjunctivae normal.   Neck:      Musculoskeletal: Neck supple.   Cardiovascular:      Rate and Rhythm: Normal rate and regular rhythm.      Heart sounds: Normal heart sounds. No murmur.   Pulmonary:      Effort: Pulmonary effort is normal. No respiratory distress.      Breath sounds: Normal breath sounds. No wheezing.      Comments: S/p right mastectomy with post-radiation hyperpigmentation. Left mediport  Abdominal:      General: Bowel sounds are normal. There is no distension.      Palpations: Abdomen is soft.      Tenderness: There is no abdominal tenderness.   Musculoskeletal: Normal range of motion.      Comments: Right UE edema with compression hose   Skin:     General: Skin is warm and dry.      Findings: No rash.   Neurological:      Mental Status: He is alert and oriented to person, place, and time.   Psychiatric:         " Behavior: Behavior normal.       Labs and Imaging  Results for orders placed or performed in visit on 08/03/20   CBC Oncology   Result Value Ref Range    WBC 3.61 (L) 3.90 - 12.70 K/uL    RBC 4.54 (L) 4.60 - 6.20 M/uL    Hemoglobin 11.9 (L) 14.0 - 18.0 g/dL    Hematocrit 38.5 (L) 40.0 - 54.0 %    Mean Corpuscular Volume 85 82 - 98 fL    Mean Corpuscular Hemoglobin 26.2 (L) 27.0 - 31.0 pg    Mean Corpuscular Hemoglobin Conc 30.9 (L) 32.0 - 36.0 g/dL    RDW 18.3 (H) 11.5 - 14.5 %    Platelets 170 150 - 350 K/uL    MPV 10.4 9.2 - 12.9 fL    Gran # (ANC) 2.2 1.8 - 7.7 K/uL    Immature Grans (Abs) 0.01 0.00 - 0.04 K/uL   Comprehensive metabolic panel   Result Value Ref Range    Sodium 138 136 - 145 mmol/L    Potassium 4.1 3.5 - 5.1 mmol/L    Chloride 104 95 - 110 mmol/L    CO2 27 23 - 29 mmol/L    Glucose 94 70 - 110 mg/dL    BUN, Bld 12 6 - 20 mg/dL    Creatinine 1.3 0.5 - 1.4 mg/dL    Calcium 9.2 8.7 - 10.5 mg/dL    Total Protein 7.4 6.0 - 8.4 g/dL    Albumin 4.0 3.5 - 5.2 g/dL    Total Bilirubin 1.1 (H) 0.1 - 1.0 mg/dL    Alkaline Phosphatase 70 55 - 135 U/L    AST 21 10 - 40 U/L    ALT 28 10 - 44 U/L    Anion Gap 7 (L) 8 - 16 mmol/L    eGFR if African American >60.0 >60 mL/min/1.73 m^2    eGFR if non African American >60.0 >60 mL/min/1.73 m^2     Mammo Digital Diagnostic Bilat w/ Paul  Narrative: Result:  Mammo Digital Diagnostic Bilat w/ Paul    History:  History of right breast cancer, post right mastectomy. Screening for the   left breast.     Films Compared:  Prior images (if available) were compared.    Findings:  This procedure was performed using tomosynthesis. Computer-aided detection   was utilized in the interpretation of this examination.     The breasts are almost entirely fatty. There is no evidence of suspicious   masses, microcalcifications or architectural distortion.    No suspicious finding on either side. Right mastectomy and post-treatment   changes.  Impression: No mammographic evidence of  malignancy.    BI-RADS Category 1: Negative    Recommendation:  Follow-up, as clinically indicated.        Assessment     Assessment:     1. Malignant neoplasm involving both nipple and areola of right breast in male, estrogen receptor negative        1. Stage IB ([id number]) invasive ductal carcinoma with lobular features of right breast, retroareolar, ER neg, MI neg, Her2 neg, Grade 2, Ki67 80%, diagnosed 5/2019   * Genetics negative   * 5/27/19 - 10/8/19 completed neoadjuvant ddAC followed by weekly Taxol.    * On 11/22/19 Dr Whyte performed a right breast mastectomy with SLN biopsy with pathology showing grade 2, 6 mm IDC with extensive treatment effect, no LVI with 1 LN positive 4 mm, negative margins. The on 12/17/19, Dr Whyte performed right axillary dissection with pathology no remaining malignancy.   * 2/3/2020 - 3/12/2020 completed RT   * 4/15/2020 start (once arrives) Xeloda 1000 mg/m2 bid days 1-14 every 21 days for 6-8 cycles depending on tolerance.    - Tolerating well.     2. Microcytic anemia   * Has anemia as baseline but worsened with chemotherapy   * Monitoring    3. Leukopenia   * Mild, monitor.      4. HTN and Diabetes per PCP; diabetes improving - off insulin.     5.Chemo neuropathy   * Gabapentin 300 mg tid. Improving.     6. Lymphedema   * Wrapped    7. Hydrocele - working with urology - plans for correction when finished with Xeloda.     8. Morbid obesity - discussed weight loss  Plan:     1. Continue Xeloda 1000 mg/m2 days 1-14 every 21 days for 6-8 cycles.    - Cycle 4 start 8/3.   - Cycle 5 start 8/24   - Cycle 6 start 9/14  2.  eval after Xeloda - discussed again today.   3. Continue gabapentin 300 mg tid.   4. Diabetes management  5. Port management. Flush in about 3-4 wks    Future Appointments   Date Time Provider Department Center   8/24/2020  7:10 AM LAB, HEMONC CANCER [facility]   8/24/2020  8:00 AM Richa Bahena MD [facility] [id number] [facility]       [facility]  risk med management

## 2020-08-03 ENCOUNTER — LAB VISIT (OUTPATIENT)
Dept: LAB | Facility: HOSPITAL | Age: 45
End: 2020-08-03
Attending: INTERNAL MEDICINE

## 2020-08-03 ENCOUNTER — OFFICE VISIT (OUTPATIENT)
Dept: HEMATOLOGY/ONCOLOGY | Facility: CLINIC | Age: 45
End: 2020-08-03

## 2020-08-03 VITALS
SYSTOLIC BLOOD PRESSURE: 140 MMHG | BODY MASS INDEX: 40.43 KG/M2 | OXYGEN SATURATION: 95 % | RESPIRATION RATE: 16 BRPM | TEMPERATURE: 98 F | WEIGHT: 315 LBS | HEIGHT: 74 IN | DIASTOLIC BLOOD PRESSURE: 75 MMHG | HEART RATE: 71 BPM

## 2020-08-03 DIAGNOSIS — E11.9 TYPE 2 DIABETES MELLITUS WITHOUT COMPLICATION, WITHOUT LONG-TERM CURRENT USE OF INSULIN: ICD-10-CM

## 2020-08-03 DIAGNOSIS — N43.3 HYDROCELE, UNSPECIFIED HYDROCELE TYPE: ICD-10-CM

## 2020-08-03 DIAGNOSIS — Z79.899 ENCOUNTER FOR LONG-TERM (CURRENT) USE OF HIGH-RISK MEDICATION: ICD-10-CM

## 2020-08-03 DIAGNOSIS — C50.021 MALIGNANT NEOPLASM INVOLVING BOTH NIPPLE AND AREOLA OF RIGHT BREAST IN MALE, ESTROGEN RECEPTOR NEGATIVE: Primary | ICD-10-CM

## 2020-08-03 DIAGNOSIS — D70.1 LEUKOPENIA DUE TO ANTINEOPLASTIC CHEMOTHERAPY: ICD-10-CM

## 2020-08-03 DIAGNOSIS — C50.021 MALIGNANT NEOPLASM INVOLVING BOTH NIPPLE AND AREOLA OF RIGHT BREAST IN MALE, ESTROGEN RECEPTOR NEGATIVE: ICD-10-CM

## 2020-08-03 DIAGNOSIS — D50.9 MICROCYTIC ANEMIA: ICD-10-CM

## 2020-08-03 DIAGNOSIS — Z17.1 MALIGNANT NEOPLASM INVOLVING BOTH NIPPLE AND AREOLA OF RIGHT BREAST IN MALE, ESTROGEN RECEPTOR NEGATIVE: Primary | ICD-10-CM

## 2020-08-03 DIAGNOSIS — T45.1X5A LEUKOPENIA DUE TO ANTINEOPLASTIC CHEMOTHERAPY: ICD-10-CM

## 2020-08-03 DIAGNOSIS — I10 ESSENTIAL HYPERTENSION: ICD-10-CM

## 2020-08-03 DIAGNOSIS — Z17.1 MALIGNANT NEOPLASM INVOLVING BOTH NIPPLE AND AREOLA OF RIGHT BREAST IN MALE, ESTROGEN RECEPTOR NEGATIVE: ICD-10-CM

## 2020-08-03 DIAGNOSIS — E66.01 MORBID OBESITY WITH BMI OF 50.0-59.9, ADULT: ICD-10-CM

## 2020-08-03 LAB
ALBUMIN SERPL BCP-MCNC: 4 G/DL (ref 3.5–5.2)
ALP SERPL-CCNC: 70 U/L (ref 55–135)
ALT SERPL W/O P-5'-P-CCNC: 28 U/L (ref 10–44)
ANION GAP SERPL CALC-SCNC: 7 MMOL/L (ref 8–16)
AST SERPL-CCNC: 21 U/L (ref 10–40)
BILIRUB SERPL-MCNC: 1.1 MG/DL (ref 0.1–1)
BUN SERPL-MCNC: 12 MG/DL (ref 6–20)
CALCIUM SERPL-MCNC: 9.2 MG/DL (ref 8.7–10.5)
CHLORIDE SERPL-SCNC: 104 MMOL/L (ref 95–110)
CO2 SERPL-SCNC: 27 MMOL/L (ref 23–29)
CREAT SERPL-MCNC: 1.3 MG/DL (ref 0.5–1.4)
ERYTHROCYTE [DISTWIDTH] IN BLOOD BY AUTOMATED COUNT: 18.3 % (ref 11.5–14.5)
EST. GFR  (AFRICAN AMERICAN): >60 ML/MIN/1.73 M^2
EST. GFR  (NON AFRICAN AMERICAN): >60 ML/MIN/1.73 M^2
GLUCOSE SERPL-MCNC: 94 MG/DL (ref 70–110)
HCT VFR BLD AUTO: 38.5 % (ref 40–54)
HGB BLD-MCNC: 11.9 G/DL (ref 14–18)
IMM GRANULOCYTES # BLD AUTO: 0.01 K/UL (ref 0–0.04)
MCH RBC QN AUTO: 26.2 PG (ref 27–31)
MCHC RBC AUTO-ENTMCNC: 30.9 G/DL (ref 32–36)
MCV RBC AUTO: 85 FL (ref 82–98)
NEUTROPHILS # BLD AUTO: 2.2 K/UL (ref 1.8–7.7)
PLATELET # BLD AUTO: 170 K/UL (ref 150–350)
PMV BLD AUTO: 10.4 FL (ref 9.2–12.9)
POTASSIUM SERPL-SCNC: 4.1 MMOL/L (ref 3.5–5.1)
PROT SERPL-MCNC: 7.4 G/DL (ref 6–8.4)
RBC # BLD AUTO: 4.54 M/UL (ref 4.6–6.2)
SODIUM SERPL-SCNC: 138 MMOL/L (ref 136–145)
WBC # BLD AUTO: 3.61 K/UL (ref 3.9–12.7)

## 2020-08-03 PROCEDURE — 36415 COLL VENOUS BLD VENIPUNCTURE: CPT

## 2020-08-03 PROCEDURE — 80053 COMPREHEN METABOLIC PANEL: CPT

## 2020-08-03 PROCEDURE — 99215 PR OFFICE/OUTPT VISIT, EST, LEVL V, 40-54 MIN: ICD-10-PCS | Mod: S$PBB,,, | Performed by: INTERNAL MEDICINE

## 2020-08-03 PROCEDURE — 85027 COMPLETE CBC AUTOMATED: CPT

## 2020-08-03 PROCEDURE — 99999 PR PBB SHADOW E&M-EST. PATIENT-LVL V: CPT | Mod: PBBFAC,,, | Performed by: INTERNAL MEDICINE

## 2020-08-03 PROCEDURE — 99215 OFFICE O/P EST HI 40 MIN: CPT | Mod: S$PBB,,, | Performed by: INTERNAL MEDICINE

## 2020-08-03 PROCEDURE — 99999 PR PBB SHADOW E&M-EST. PATIENT-LVL V: ICD-10-PCS | Mod: PBBFAC,,, | Performed by: INTERNAL MEDICINE

## 2020-08-03 PROCEDURE — 99215 OFFICE O/P EST HI 40 MIN: CPT | Mod: PBBFAC | Performed by: INTERNAL MEDICINE

## 2020-08-11 ENCOUNTER — TELEPHONE (OUTPATIENT)
Dept: PHARMACY | Facility: CLINIC | Age: 45
End: 2020-08-11

## 2020-08-11 NOTE — TELEPHONE ENCOUNTER
Ordered refills with Neto Nordisk (Levemir Flextouch, Novolog Pen and Novofine Pen Needles) [employer] [facility] (Trulicity) and [employer] (Jardiance) Medication will be shipped to patients home within 7-10 business days.

## 2020-08-19 DIAGNOSIS — E11.9 TYPE 2 DIABETES MELLITUS WITHOUT COMPLICATION, WITHOUT LONG-TERM CURRENT USE OF INSULIN: Primary | ICD-10-CM

## 2020-08-19 DIAGNOSIS — I10 HYPERTENSION, UNSPECIFIED TYPE: ICD-10-CM

## 2020-08-19 RX ORDER — AMLODIPINE BESYLATE 10 MG/1
TABLET ORAL
Qty: 30 TABLET | Refills: 10 | Status: CANCELLED | OUTPATIENT
Start: 2020-08-19

## 2020-08-19 NOTE — TELEPHONE ENCOUNTER
Encounter details (provider/department) have been updated by [facility] staff.   Of note, HF details may not display.     As of this time CDM: Does not populate or display     Updated: Provider    Of note. CDM should display. medication Is delegated and encounter details have been updated    Will resend refill request encounter to P Centralized Refill Staff Pool.     [facility]     Note composed:1:15 PM 08/19/2020

## 2020-08-19 NOTE — TELEPHONE ENCOUNTER
No new care gaps identified.  Powered by [employer]. Reference number: [id number]. 8/19/2020 1:16:53 PM CDT

## 2020-08-19 NOTE — TELEPHONE ENCOUNTER
No new care gaps identified.  Powered by [employer]. Reference number: [id number]. 8/19/2020 10:20:10 AM   CDT

## 2020-08-19 NOTE — TELEPHONE ENCOUNTER
Encounter details (provider/department) have been updated by [facility] staff.   Of note, HF details may not display.     As of this time CDM: Does not populate or display     Updated: Provider    Of note. CDM should display. medication Is delegated and encounter details have been updated    Will resend refill request encounter to P Centralized Refill Staff Pool.     [facility]     Note composed:10:19 AM 08/19/2020

## 2020-08-20 RX ORDER — AMLODIPINE BESYLATE 10 MG/1
TABLET ORAL
Qty: 90 TABLET | Refills: 3 | Status: SHIPPED | OUTPATIENT
Start: 2020-08-20 | End: 2021-12-14 | Stop reason: SDUPTHER

## 2020-08-20 NOTE — PROGRESS NOTES
Refill Routing Note   Medication(s) are not appropriate for processing by [facility]:       - Medication not previously prescribed by PCP     Will follow up with your staff to schedule labs after your decision.    Medication-related problems identified: Requires labs  Medication Therapy Plan: BP-ELEVATED(140/75) AT HEM/ONC, BP-WNL(130/84) AT LOV; CDMF. NTBO(A1C); MEDICATION NOT PREVIOUSLY PRESCRIBED BY PCP, DEFER TO YOU  Medication reconciliation completed: No      Automatic Epic Generated Protocol Data:        Requested Prescriptions   Pending Prescriptions Disp Refills    amLODIPine (NORVASC) 10 MG tablet 90 tablet 1     Sig: TAKE 1 TABLET (10 MG) BY MOUTH EVERY DAY       Cardiovascular:  Calcium Channel Blockers Failed - 8/20/2020  7:30 AM        Failed - Last BP in normal range within 360 days.     BP Readings from Last 3 Encounters:   08/03/20 (!) 140/75   07/20/20 110/61   06/16/20 124/68              Passed - Patient is at least 18 years old        Passed - Office visit in past 12 months or future 90 days.     Recent Outpatient Visits            2 weeks ago Malignant neoplasm involving both nipple and areola of right breast in male, estrogen receptor negative    [facility] Richa Bahena MD    1 month ago Malignant neoplasm involving both nipple and areola of right breast in male, estrogen receptor negative    [facility] Richa Bahena MD    1 month ago [facility] history of breast cancer    [facility]    2 months ago Malignant neoplasm involving both nipple and areola of right breast in male, estrogen receptor negative    [facility] Richa Bahena MD    3 months ago Malignant neoplasm involving both nipple and areola of right breast in male, estrogen receptor negative    [facility] Richa Bahena MD          Future Appointments              In 4 days LAB, [facility]CER [facility]    In 4 days MD [facility] - Hematology Oncology, [facility]               Calcium-Channel Blockers Protocol Passed - 8/20/2020  7:30 AM        Passed - Visit with authorizing provider in past 12 months or upcoming 90 days         Passed - Blood Pressure below 139/89 on file in past 12 months      BP Readings from Last 3 Encounters:   08/03/20 (!) 140/75   07/20/20 110/61   06/16/20 124/68                   Appointments  past 12m or future 3m with PCP    Date Provider   Last Visit   2/27/2020 Kareem Arroyo MD   Next Visit   Visit date not found Kareem Arroyo MD   ED visits in past 90 days: 0     Note composed:7:36 AM 08/20/2020

## 2020-08-21 NOTE — TELEPHONE ENCOUNTER
Provider Staff:     Please schedule patient for Labs (A1C)    Please also check with your provider if any further labs need to be added and scheduled together.    Thanks!  [facility]     Appointments  past 12m or future 3m with PCP    Date Provider   Last Visit   2/27/2020 Kareem Arroyo MD   Next Visit   Visit date not found Kareem Arroyo MD     Note composed:7:44 AM 08/21/2020

## 2020-08-24 ENCOUNTER — OFFICE VISIT (OUTPATIENT)
Dept: HEMATOLOGY/ONCOLOGY | Facility: CLINIC | Age: 45
End: 2020-08-24

## 2020-08-24 ENCOUNTER — LAB VISIT (OUTPATIENT)
Dept: LAB | Facility: HOSPITAL | Age: 45
End: 2020-08-24
Attending: INTERNAL MEDICINE

## 2020-08-24 VITALS
BODY MASS INDEX: 40.43 KG/M2 | WEIGHT: 315 LBS | HEIGHT: 74 IN | DIASTOLIC BLOOD PRESSURE: 86 MMHG | OXYGEN SATURATION: 99 % | HEART RATE: 66 BPM | SYSTOLIC BLOOD PRESSURE: 162 MMHG | TEMPERATURE: 98 F | RESPIRATION RATE: 16 BRPM

## 2020-08-24 DIAGNOSIS — D70.1 CHEMOTHERAPY-INDUCED NEUTROPENIA: ICD-10-CM

## 2020-08-24 DIAGNOSIS — D70.1 LEUKOPENIA DUE TO ANTINEOPLASTIC CHEMOTHERAPY: ICD-10-CM

## 2020-08-24 DIAGNOSIS — T45.1X5A LEUKOPENIA DUE TO ANTINEOPLASTIC CHEMOTHERAPY: ICD-10-CM

## 2020-08-24 DIAGNOSIS — Z17.1 MALIGNANT NEOPLASM INVOLVING BOTH NIPPLE AND AREOLA OF RIGHT BREAST IN MALE, ESTROGEN RECEPTOR NEGATIVE: Primary | ICD-10-CM

## 2020-08-24 DIAGNOSIS — I10 ESSENTIAL HYPERTENSION: ICD-10-CM

## 2020-08-24 DIAGNOSIS — T45.1X5A CHEMOTHERAPY-INDUCED NEUTROPENIA: ICD-10-CM

## 2020-08-24 DIAGNOSIS — Z17.1 MALIGNANT NEOPLASM INVOLVING BOTH NIPPLE AND AREOLA OF RIGHT BREAST IN MALE, ESTROGEN RECEPTOR NEGATIVE: ICD-10-CM

## 2020-08-24 DIAGNOSIS — C50.021 MALIGNANT NEOPLASM INVOLVING BOTH NIPPLE AND AREOLA OF RIGHT BREAST IN MALE, ESTROGEN RECEPTOR NEGATIVE: ICD-10-CM

## 2020-08-24 DIAGNOSIS — C50.021 MALIGNANT NEOPLASM INVOLVING BOTH NIPPLE AND AREOLA OF RIGHT BREAST IN MALE, ESTROGEN RECEPTOR NEGATIVE: Primary | ICD-10-CM

## 2020-08-24 DIAGNOSIS — Z79.899 ENCOUNTER FOR LONG-TERM (CURRENT) USE OF HIGH-RISK MEDICATION: ICD-10-CM

## 2020-08-24 DIAGNOSIS — F43.23 ADJUSTMENT DISORDER WITH MIXED ANXIETY AND DEPRESSED MOOD: ICD-10-CM

## 2020-08-24 DIAGNOSIS — D50.9 MICROCYTIC ANEMIA: ICD-10-CM

## 2020-08-24 DIAGNOSIS — N43.3 HYDROCELE, UNSPECIFIED HYDROCELE TYPE: ICD-10-CM

## 2020-08-24 LAB
ALBUMIN SERPL BCP-MCNC: 3.6 G/DL (ref 3.5–5.2)
ALP SERPL-CCNC: 68 U/L (ref 55–135)
ALT SERPL W/O P-5'-P-CCNC: 29 U/L (ref 10–44)
ANION GAP SERPL CALC-SCNC: 8 MMOL/L (ref 8–16)
AST SERPL-CCNC: 21 U/L (ref 10–40)
BILIRUB SERPL-MCNC: 0.6 MG/DL (ref 0.1–1)
BUN SERPL-MCNC: 13 MG/DL (ref 6–20)
CALCIUM SERPL-MCNC: 8.8 MG/DL (ref 8.7–10.5)
CHLORIDE SERPL-SCNC: 109 MMOL/L (ref 95–110)
CO2 SERPL-SCNC: 25 MMOL/L (ref 23–29)
CREAT SERPL-MCNC: 1.1 MG/DL (ref 0.5–1.4)
ERYTHROCYTE [DISTWIDTH] IN BLOOD BY AUTOMATED COUNT: 17.8 % (ref 11.5–14.5)
EST. GFR  (AFRICAN AMERICAN): >60 ML/MIN/1.73 M^2
EST. GFR  (NON AFRICAN AMERICAN): >60 ML/MIN/1.73 M^2
GLUCOSE SERPL-MCNC: 105 MG/DL (ref 70–110)
HCT VFR BLD AUTO: 40.9 % (ref 40–54)
HGB BLD-MCNC: 12.4 G/DL (ref 14–18)
IMM GRANULOCYTES # BLD AUTO: 0.01 K/UL (ref 0–0.04)
MCH RBC QN AUTO: 26.7 PG (ref 27–31)
MCHC RBC AUTO-ENTMCNC: 30.3 G/DL (ref 32–36)
MCV RBC AUTO: 88 FL (ref 82–98)
NEUTROPHILS # BLD AUTO: 1.4 K/UL (ref 1.8–7.7)
PLATELET # BLD AUTO: 171 K/UL (ref 150–350)
PMV BLD AUTO: 10.8 FL (ref 9.2–12.9)
POTASSIUM SERPL-SCNC: 4.3 MMOL/L (ref 3.5–5.1)
PROT SERPL-MCNC: 7.2 G/DL (ref 6–8.4)
RBC # BLD AUTO: 4.64 M/UL (ref 4.6–6.2)
SODIUM SERPL-SCNC: 142 MMOL/L (ref 136–145)
WBC # BLD AUTO: 2.79 K/UL (ref 3.9–12.7)

## 2020-08-24 PROCEDURE — 99999 PR PBB SHADOW E&M-EST. PATIENT-LVL V: CPT | Mod: PBBFAC,,, | Performed by: INTERNAL MEDICINE

## 2020-08-24 PROCEDURE — 85027 COMPLETE CBC AUTOMATED: CPT

## 2020-08-24 PROCEDURE — 99999 PR PBB SHADOW E&M-EST. PATIENT-LVL V: ICD-10-PCS | Mod: PBBFAC,,, | Performed by: INTERNAL MEDICINE

## 2020-08-24 PROCEDURE — 99214 PR OFFICE/OUTPT VISIT, EST, LEVL IV, 30-39 MIN: ICD-10-PCS | Mod: S$PBB,,, | Performed by: INTERNAL MEDICINE

## 2020-08-24 PROCEDURE — 99214 OFFICE O/P EST MOD 30 MIN: CPT | Mod: S$PBB,,, | Performed by: INTERNAL MEDICINE

## 2020-08-24 PROCEDURE — 80053 COMPREHEN METABOLIC PANEL: CPT

## 2020-08-24 PROCEDURE — 99215 OFFICE O/P EST HI 40 MIN: CPT | Mod: PBBFAC | Performed by: INTERNAL MEDICINE

## 2020-08-24 PROCEDURE — 36415 COLL VENOUS BLD VENIPUNCTURE: CPT

## 2020-08-24 NOTE — PROGRESS NOTES
History:     Reason For Follow Up:   1. Stage IB ([id number]) triple negative invasive ductal carcinoma with lobular features of right breast, retroareolar, Grade 2, Ki67 80%, diagnosed 5/2019    HPI:   Paul Li Jr. presents for follow up of breast cancer. He remains on Xeloda which he tolerates very well. His wife is newly pregnant. Neuropathy improving. No acute events. He is not a candidate for  due to exceeding the time to treatment window.     Onc History:  Oncology History   Malignant neoplasm involving both nipple and areola of right breast in male, estrogen receptor negative   5/1/2019 Imaging Significant Findings    Mammogram and US  Impression:  Right  Mass: Right breast 14 mm mass at the retroareolar anterior position. Assessment: 4 - Suspicious finding. Biopsy is recommended.      Left  There is no mammographic or sonographic evidence of malignancy.          Recommendation:  Biopsy is recommended.     5/2/2019 Biopsy    BREAST, RIGHT, RETROAREOLAR MASS, ULTRASOUND-GUIDED BIOPSY:  Invasive ductal carcinoma with lobular features.  Size of invasive carcinoma: 5 MM in greatest linear dimension within a single     5/2/2019 Breast Tumor Markers    Estrogen: Negative  Progesterone: Negative  HER2: Negative  Ki67: 80     5/2/2019 Cancer Staged    Staging form: Breast, AJCC 8th Edition  - Clinical stage from 5/17/2019: Stage IB (cT1c, cN0, cM0, G2, ER-, OR-, HER2-) - Signed by Richa Bahena MD on 5/17/2019 5/27/2019 - 7/21/2019 Chemotherapy    Treatment Summary   Plan Name: OP BREAST DOSE-DENSE AC - DOXORUBICIN CYCLOPHOSPHAMIDE Q2W  Treatment Goal: Curative  Status: Inactive  Start Date: 5/27/2019  End Date: 7/9/2019  Provider: Richa Bahena MD  Chemotherapy: DOXOrubicin chemo injection 186 mg, 60 mg/m2 = 186 mg, Intravenous, Clinic/HOD 1 time, 4 of 4 cycles  Administration: 186 mg (5/27/2019), 186 mg (6/10/2019), 186 mg (6/24/2019), 186 mg (7/8/2019)  cyclophosphamide (CYTOXAN) 600 mg/m2 =  1,865 mg in sodium chloride 0.9% 250 mL chemo infusion, 600 mg/m2 = 1,865 mg, Intravenous, Clinic/HOD 1 time, 4 of 4 cycles  Administration: 1,865 mg (5/27/2019), 1,865 mg (6/10/2019), 1,865 mg (6/24/2019), 1,865 mg (7/8/2019)     7/22/2019 - 10/15/2019 Chemotherapy    Treatment Summary   Plan Name: OP PACLITAXEL QW  Treatment Goal: Curative  Status: Inactive  Start Date: 7/24/2019  End Date: 10/8/2019  Provider: Richa Bahena MD  Chemotherapy: PACLitaxel (TAXOL) 80 mg/m2 = 246 mg in sodium chloride 0.9% 250 mL chemo infusion, 80 mg/m2 = 246 mg, Intravenous, Clinic/HOD 1 time, 12 of 12 cycles  Dose modification: 60 mg/m2 (original dose 80 mg/m2, Cycle 3), 55 mg/m2 (original dose 80 mg/m2, Cycle 7), 60 mg/m2 (original dose 80 mg/m2, Cycle 7), 50 mg/m2 (original dose 80 mg/m2, Cycle 9), 50 mg/m2 (original dose 80 mg/m2, Cycle 10)  Administration: 246 mg (7/24/2019), 246 mg (7/31/2019), 186 mg (8/7/2019), 186 mg (8/14/2019), 186 mg (8/21/2019), 186 mg (8/28/2019), 186 mg (9/4/2019), 174 mg (9/11/2019), 156 mg (9/18/2019), 156 mg (9/25/2019), 156 mg (10/1/2019), 156 mg (10/8/2019)     11/22/2019 Breast Surgery    On 11/22/19 Dr whyte performed a right breast mastectomy with SLN biopsy with pathology showing grade 2, 6 mm IDC with extensive treatment effect, no LVI with 1 LN positive 4 mm, negative margins. The on 12/17/19, Dr Whyte performed right axillary dissection with pathology still pending.     11/22/2019 Cancer Staged    ypT1b N1     12/17/2019 Breast Surgery    Surgery: Dr Shima Whyte, [phone] performed right ALND with pathology showing 14 negative lymph nodes.          1/14/2020 Tumor Conference       Post mastectomy radiation therapy: Xeloda, then possible Keytruda trial .     2/3/2020 - 3/23/2020 Radiation Therapy    Treating physician: Speedy Nair MD   Total Dose: 50.4 Gy to the chest wall and regional lymphatics  10 Gy boost to the prostate.   Fractions: 28 fractions at 1.8 Gy per  fraction  5 fractions at 2 Gy per fraction      2/28/2020 -  Chemotherapy    Treatment Summary   Plan Name: OP CAPECITABINE 1250MG/M2 BID Q3W  Treatment Goal: Curative  Status: Active  Start Date: 3/20/2020  End Date: 3/20/2020  Provider: Richa Bahena MD  Chemotherapy: [No matching medication found in this treatment plan]           Medications    Current Outpatient Medications:     amLODIPine (NORVASC) 10 MG tablet, TAKE 1 TABLET (10 MG) BY MOUTH EVERY DAY, Disp: 90 tablet, Rfl: 3    atorvastatin (LIPITOR) 20 MG tablet, Take 1 tablet (20 mg total) by mouth once daily., Disp: 90 tablet, Rfl: 3    blood sugar diagnostic Strp, To check BG 4 times daily, to use with insurance preferred meter, Disp: 400 each, Rfl: 3    blood-glucose meter kit, Use as instructed, Disp: 1 each, Rfl: 0    capecitabine (XELODA) 500 MG Tab, Take 6 tablets (3,000 mg total) by mouth 2 (two) times daily Days 1-14 every 21 days., Disp: 168 tablet, Rfl: 6    dulaglutide (TRULICITY) 1.5 mg/0.5 mL PnIj, Inject 1.5 mg into the skin once a week. Trulicity 0.75mg weekly for 4 weeks & then increase to 1.5 mg weekly indefinitely., Disp: 6 mL, Rfl: 3    gabapentin (NEURONTIN) 300 MG capsule, Take 1 capsule (300 mg total) by mouth 3 (three) times daily., Disp: 90 capsule, Rfl: 11    hydroCHLOROthiazide (HYDRODIURIL) 25 MG tablet, TAKE 1 TABLET BY MOUTH ONCE DAILY, Disp: 90 tablet, Rfl: 3    lancets 33 gauge Misc, Use 2 (two) times daily with meals., Disp: 100 each, Rfl: 11    lancets 33 gauge Misc, To check BG 4 times daily, to use with insurance preferred meter, Disp: 200 each, Rfl: 11    lancing device Misc, 1 Device by Misc.(Non-Drug; Combo Route) route 2 (two) times daily with meals., Disp: 1 each, Rfl: 0    lidocaine-prilocaine (EMLA) cream, Apply topically to affected area 45 minutes before port access, Disp: 30 g, Rfl: 1    losartan (COZAAR) 50 MG tablet, Take 2 tablets by mouth once daily, Disp: 210 tablet, Rfl: 0    metFORMIN  "(GLUCOPHAGE) 500 MG tablet, Take 2 tablets (1,000 mg total) by mouth 2 (two) times daily with meals., Disp: 90 tablet, Rfl: 3    ondansetron (ZOFRAN) 8 MG tablet, Take 1 tablet (8 mg total) by mouth every 8 (eight) hours as needed for Nausea., Disp: 30 tablet, Rfl: 2    ondansetron (ZOFRAN-ODT) 8 MG TbDL, Take 1 tablet (8 mg total) by mouth every 12 (twelve) hours as needed., Disp: 20 tablet, Rfl: 1    pen needle, diabetic (BD ULTRA-FINE TANESHA PEN NEEDLE) 32 gauge x 5/32" Ndle, Use as directed with novolog and levemir, Disp: 100 each, Rfl: 5    senna (SENOKOT) 8.6 mg tablet, Take 1 tablet by mouth once daily., Disp: , Rfl:     tadalafiL (CIALIS) 5 MG tablet, Take 2 tablets (10 mg total) by mouth daily as needed for Erectile Dysfunction., Disp: 20 tablet, Rfl: 1  No current facility-administered medications for this visit.     Facility-Administered Medications Ordered in Other Visits:     heparin, porcine (PF) 100 unit/mL injection flush 500 Units, 500 Units, Intravenous, 1 time in Clinic/HOD, Richa Bahena MD    sodium chloride 0.9% flush 10 mL, 10 mL, Intravenous, 1 time in Clinic/HOD, Richa Bahena MD  Allergies  Review of patient's allergies indicates:  No Known Allergies  Review of Systems  Review of Systems   Constitutional: Negative for activity change, appetite change, chills, fatigue, fever and unexpected weight change.   HENT: Negative for congestion, hearing loss, nosebleeds, sinus pressure, sinus pain and trouble swallowing.    Eyes: Negative for pain, discharge, itching and visual disturbance.   Respiratory: Negative for cough, chest tightness and shortness of breath.    Cardiovascular: Negative for chest pain, palpitations and leg swelling.   Gastrointestinal: Negative for abdominal distention, abdominal pain, blood in stool, constipation, diarrhea, nausea, rectal pain and vomiting.   Endocrine: Negative for heat intolerance and polydipsia.   Genitourinary: Negative for difficulty urinating, " "dysuria, flank pain, frequency, hematuria and urgency.   Musculoskeletal: Negative for arthralgias, back pain and neck pain.   Skin: Negative for color change, pallor and rash.   Neurological: Negative for dizziness, numbness and headaches.   Hematological: Negative for adenopathy. Does not bruise/bleed easily.   Psychiatric/Behavioral: Negative for confusion, decreased concentration, dysphoric mood and sleep disturbance. The patient is not nervous/anxious.         Objective     Objective:     Vitals:    08/24/20 0750   BP: (!) 162/86   BP Location: Left arm   Patient Position: Sitting   BP Method: Large (Automatic)   Pulse: 66   Resp: 16   Temp: 97.7 °F (36.5 °C)   TempSrc: Oral   SpO2: 99%   Weight: (!) 182.5 kg (402 lb 5.4 oz)   Height: 6' 2" (1.88 m)     Physical Exam  Constitutional:       General: He is not in acute distress.     Appearance: He is well-developed. He is obese. He is not diaphoretic.   Eyes:      General: No scleral icterus.     Conjunctiva/sclera: Conjunctivae normal.   Neck:      Musculoskeletal: Neck supple.   Cardiovascular:      Rate and Rhythm: Normal rate and regular rhythm.      Heart sounds: Normal heart sounds. No murmur.   Pulmonary:      Effort: Pulmonary effort is normal. No respiratory distress.      Breath sounds: Normal breath sounds. No wheezing.      Comments: S/p right mastectomy. Left mediport  Abdominal:      General: Bowel sounds are normal. There is no distension.      Palpations: Abdomen is soft.      Tenderness: There is no abdominal tenderness.   Musculoskeletal: Normal range of motion.      Comments: Right UE edema with compression hose   Skin:     General: Skin is warm and dry.      Findings: No rash.   Neurological:      Mental Status: He is alert and oriented to person, place, and time.   Psychiatric:         Behavior: Behavior normal.       Labs and Imaging  Results for orders placed or performed in visit on 08/24/20   Comprehensive metabolic panel   Result Value " Ref Range    Sodium 142 136 - 145 mmol/L    Potassium 4.3 3.5 - 5.1 mmol/L    Chloride 109 95 - 110 mmol/L    CO2 25 23 - 29 mmol/L    Glucose 105 70 - 110 mg/dL    BUN, Bld 13 6 - 20 mg/dL    Creatinine 1.1 0.5 - 1.4 mg/dL    Calcium 8.8 8.7 - 10.5 mg/dL    Total Protein 7.2 6.0 - 8.4 g/dL    Albumin 3.6 3.5 - 5.2 g/dL    Total Bilirubin 0.6 0.1 - 1.0 mg/dL    Alkaline Phosphatase 68 55 - 135 U/L    AST 21 10 - 40 U/L    ALT 29 10 - 44 U/L    Anion Gap 8 8 - 16 mmol/L    eGFR if African American >60.0 >60 mL/min/1.73 m^2    eGFR if non African American >60.0 >60 mL/min/1.73 m^2     Mammo Digital Diagnostic Bilat w/ Paul  Narrative: Result:  Mammo Digital Diagnostic Bilat w/ Paul    History:  History of right breast cancer, post right mastectomy. Screening for the   left breast.     Films Compared:  Prior images (if available) were compared.    Findings:  This procedure was performed using tomosynthesis. Computer-aided detection   was utilized in the interpretation of this examination.     The breasts are almost entirely fatty. There is no evidence of suspicious   masses, microcalcifications or architectural distortion.    No suspicious finding on either side. Right mastectomy and post-treatment   changes.  Impression: No mammographic evidence of malignancy.    BI-RADS Category 1: Negative    Recommendation:  Follow-up, as clinically indicated.        Assessment     Assessment:     1. Malignant neoplasm involving both nipple and areola of right breast in male, estrogen receptor negative    2. Essential hypertension    3. Chemotherapy-induced neutropenia    4. Adjustment disorder with mixed anxiety and depressed mood        1. Stage IB ([id number]) invasive ductal carcinoma with lobular features of right breast, retroareolar, ER neg, [place] neg, Her2 neg, Grade 2, Ki67 80%, diagnosed 5/2019   * Genetics negative   * 5/27/19 - 10/8/19 completed neoadjuvant ddAC followed by weekly Taxol.    * On 11/22/19 Dr Whyte performed a  right breast mastectomy with SLN biopsy with pathology showing grade 2, 6 mm IDC with extensive treatment effect, no LVI with 1 LN positive 4 mm, negative margins. The on 12/17/19, Dr Whyte performed right axillary dissection with pathology no remaining malignancy.   * 2/3/2020 - 3/12/2020 completed RT   * 4/15/2020 start (once arrives) Xeloda 1000 mg/m2 bid days 1-14 every 21 days for 6-8 cycles depending on tolerance.    - Tolerating well.     2. Microcytic anemia   * Has anemia as baseline but worsened with chemotherapy   * Monitoring    3. Leukopenia   * Mild, monitor.      4. HTN and Diabetes per PCP; diabetes improving - off insulin.     5.Chemo neuropathy   * Gabapentin 300 mg tid. Improving.     6. Lymphedema   * Sleeve in place    7. Hydrocele - working with urology - plans for correction when finished with Xeloda.     8. Morbid obesity - discussed weight loss    9. Of note, patient's wife is newly pregnant. I discussed today that Xeloda can increase the risk of miscarriage and birth defects. He should let the OB/Gyn know that he was taking Xeloda when conception occurred.   Plan:     1. Continue Xeloda 1000 mg/m2 days 1-14 every 21 days for 6-8 cycles.    - Cycle 4 start 8/3.   - Cycle 5 start 8/24   - Cycle 6 start 9/14. Discussed doing 8 cycles since tolerance is good, but he declines.   2.  -  Not a candidate due to timing.    3. Continue gabapentin 300 mg tid.   4. Diabetes management  5. Port management - back to Dr whyte after next visit since no     Future Appointments   Date Time Provider Department Center   9/14/2020  8:50 AM LAB, [facility]   9/14/2020 10:00 AM Richa Bahena MD [id number]       High risk med management

## 2020-09-14 ENCOUNTER — OFFICE VISIT (OUTPATIENT)
Dept: HEMATOLOGY/ONCOLOGY | Facility: CLINIC | Age: 45
End: 2020-09-14

## 2020-09-14 ENCOUNTER — LAB VISIT (OUTPATIENT)
Dept: LAB | Facility: HOSPITAL | Age: 45
End: 2020-09-14
Attending: INTERNAL MEDICINE

## 2020-09-14 VITALS
DIASTOLIC BLOOD PRESSURE: 88 MMHG | BODY MASS INDEX: 40.43 KG/M2 | SYSTOLIC BLOOD PRESSURE: 167 MMHG | WEIGHT: 315 LBS | HEIGHT: 74 IN | HEART RATE: 72 BPM | TEMPERATURE: 98 F | OXYGEN SATURATION: 97 % | RESPIRATION RATE: 18 BRPM

## 2020-09-14 DIAGNOSIS — D70.1 CHEMOTHERAPY-INDUCED NEUTROPENIA: ICD-10-CM

## 2020-09-14 DIAGNOSIS — C50.021 MALIGNANT NEOPLASM INVOLVING BOTH NIPPLE AND AREOLA OF RIGHT BREAST IN MALE, ESTROGEN RECEPTOR NEGATIVE: ICD-10-CM

## 2020-09-14 DIAGNOSIS — Z17.1 MALIGNANT NEOPLASM INVOLVING BOTH NIPPLE AND AREOLA OF RIGHT BREAST IN MALE, ESTROGEN RECEPTOR NEGATIVE: Primary | ICD-10-CM

## 2020-09-14 DIAGNOSIS — E66.01 MORBID OBESITY WITH BMI OF 50.0-59.9, ADULT: ICD-10-CM

## 2020-09-14 DIAGNOSIS — F43.23 ADJUSTMENT DISORDER WITH MIXED ANXIETY AND DEPRESSED MOOD: ICD-10-CM

## 2020-09-14 DIAGNOSIS — Z17.1 MALIGNANT NEOPLASM INVOLVING BOTH NIPPLE AND AREOLA OF RIGHT BREAST IN MALE, ESTROGEN RECEPTOR NEGATIVE: ICD-10-CM

## 2020-09-14 DIAGNOSIS — T45.1X5A CHEMOTHERAPY-INDUCED NEUTROPENIA: ICD-10-CM

## 2020-09-14 DIAGNOSIS — I10 ESSENTIAL HYPERTENSION: ICD-10-CM

## 2020-09-14 DIAGNOSIS — C50.021 MALIGNANT NEOPLASM INVOLVING BOTH NIPPLE AND AREOLA OF RIGHT BREAST IN MALE, ESTROGEN RECEPTOR NEGATIVE: Primary | ICD-10-CM

## 2020-09-14 LAB
ALBUMIN SERPL BCP-MCNC: 4 G/DL (ref 3.5–5.2)
ALP SERPL-CCNC: 75 U/L (ref 55–135)
ALT SERPL W/O P-5'-P-CCNC: 39 U/L (ref 10–44)
ANION GAP SERPL CALC-SCNC: 8 MMOL/L (ref 8–16)
AST SERPL-CCNC: 26 U/L (ref 10–40)
BILIRUB SERPL-MCNC: 0.7 MG/DL (ref 0.1–1)
BUN SERPL-MCNC: 14 MG/DL (ref 6–20)
CALCIUM SERPL-MCNC: 8.9 MG/DL (ref 8.7–10.5)
CHLORIDE SERPL-SCNC: 106 MMOL/L (ref 95–110)
CO2 SERPL-SCNC: 25 MMOL/L (ref 23–29)
CREAT SERPL-MCNC: 1.2 MG/DL (ref 0.5–1.4)
ERYTHROCYTE [DISTWIDTH] IN BLOOD BY AUTOMATED COUNT: 17.7 % (ref 11.5–14.5)
EST. GFR  (AFRICAN AMERICAN): >60 ML/MIN/1.73 M^2
EST. GFR  (NON AFRICAN AMERICAN): >60 ML/MIN/1.73 M^2
GLUCOSE SERPL-MCNC: 99 MG/DL (ref 70–110)
HCT VFR BLD AUTO: 39.1 % (ref 40–54)
HGB BLD-MCNC: 12.3 G/DL (ref 14–18)
IMM GRANULOCYTES # BLD AUTO: 0.01 K/UL (ref 0–0.04)
MCH RBC QN AUTO: 27 PG (ref 27–31)
MCHC RBC AUTO-ENTMCNC: 31.5 G/DL (ref 32–36)
MCV RBC AUTO: 86 FL (ref 82–98)
NEUTROPHILS # BLD AUTO: 1.8 K/UL (ref 1.8–7.7)
PLATELET # BLD AUTO: 180 K/UL (ref 150–350)
PMV BLD AUTO: 10 FL (ref 9.2–12.9)
POTASSIUM SERPL-SCNC: 4.1 MMOL/L (ref 3.5–5.1)
PROT SERPL-MCNC: 7.4 G/DL (ref 6–8.4)
RBC # BLD AUTO: 4.55 M/UL (ref 4.6–6.2)
SODIUM SERPL-SCNC: 139 MMOL/L (ref 136–145)
WBC # BLD AUTO: 3.37 K/UL (ref 3.9–12.7)

## 2020-09-14 PROCEDURE — 99213 OFFICE O/P EST LOW 20 MIN: CPT | Mod: S$PBB,,, | Performed by: INTERNAL MEDICINE

## 2020-09-14 PROCEDURE — 99213 PR OFFICE/OUTPT VISIT, EST, LEVL III, 20-29 MIN: ICD-10-PCS | Mod: S$PBB,,, | Performed by: INTERNAL MEDICINE

## 2020-09-14 PROCEDURE — 80053 COMPREHEN METABOLIC PANEL: CPT

## 2020-09-14 PROCEDURE — 36415 COLL VENOUS BLD VENIPUNCTURE: CPT

## 2020-09-14 PROCEDURE — 99999 PR PBB SHADOW E&M-EST. PATIENT-LVL V: CPT | Mod: PBBFAC,,, | Performed by: INTERNAL MEDICINE

## 2020-09-14 PROCEDURE — 99999 PR PBB SHADOW E&M-EST. PATIENT-LVL V: ICD-10-PCS | Mod: PBBFAC,,, | Performed by: INTERNAL MEDICINE

## 2020-09-14 PROCEDURE — 85027 COMPLETE CBC AUTOMATED: CPT

## 2020-09-14 PROCEDURE — 99215 OFFICE O/P EST HI 40 MIN: CPT | Mod: PBBFAC | Performed by: INTERNAL MEDICINE

## 2020-09-14 NOTE — PROGRESS NOTES
History:     Reason For Follow Up:   1. Stage IB ([id number]) triple negative invasive ductal carcinoma with lobular features of right breast, retroareolar, Grade 2, Ki67 80%, diagnosed 5/2019    HPI:   Paul Li Jr. presents for follow up of breast cancer. He remains on Xeloda which he tolerates very well - he is due to start his last cycle today. His wife is newly pregnant. Neuropathy improving. Generally doing well.     Onc History:  Oncology History   Malignant neoplasm involving both nipple and areola of right breast in male, estrogen receptor negative   5/1/2019 Imaging Significant Findings    Mammogram and US  Impression:  Right  Mass: Right breast 14 mm mass at the retroareolar anterior position. Assessment: 4 - Suspicious finding. Biopsy is recommended.      Left  There is no mammographic or sonographic evidence of malignancy.          Recommendation:  Biopsy is recommended.     5/2/2019 Biopsy    BREAST, RIGHT, RETROAREOLAR MASS, ULTRASOUND-GUIDED BIOPSY:  Invasive ductal carcinoma with lobular features.  Size of invasive carcinoma: 5 MM in greatest linear dimension within a single     5/2/2019 Breast Tumor Markers    Estrogen: Negative  Progesterone: Negative  HER2: Negative  Ki67: 80     5/17/2019 Cancer Staged    Staging form: Breast, AJCC 8th Edition  - Clinical stage from 5/17/2019: Stage IB (cT1c, cN0, cM0, G2, ER-, NC-, HER2-) - Signed by Richa Bahena MD on 5/17/2019 5/27/2019 - 7/21/2019 Chemotherapy    Treatment Summary   Plan Name: OP BREAST DOSE-DENSE AC - DOXORUBICIN CYCLOPHOSPHAMIDE Q2W  Treatment Goal: Curative  Status: Inactive  Start Date: 5/27/2019  End Date: 7/9/2019  Provider: Richa Bahena MD  Chemotherapy: DOXOrubicin chemo injection 186 mg, 60 mg/m2 = 186 mg, Intravenous, Clinic/HOD 1 time, 4 of 4 cycles  Administration: 186 mg (5/27/2019), 186 mg (6/10/2019), 186 mg (6/24/2019), 186 mg (7/8/2019)  cyclophosphamide (CYTOXAN) 600 mg/m2 = 1,865 mg in sodium chloride 0.9%  250 mL chemo infusion, 600 mg/m2 = 1,865 mg, Intravenous, Clinic/HOD 1 time, 4 of 4 cycles  Administration: 1,865 mg (5/27/2019), 1,865 mg (6/10/2019), 1,865 mg (6/24/2019), 1,865 mg (7/8/2019)     7/22/2019 - 10/15/2019 Chemotherapy    Treatment Summary   Plan Name: OP PACLITAXEL QW  Treatment Goal: Curative  Status: Inactive  Start Date: 7/24/2019  End Date: 10/8/2019  Provider: Richa Bahena MD  Chemotherapy: PACLitaxel (TAXOL) 80 mg/m2 = 246 mg in sodium chloride 0.9% 250 mL chemo infusion, 80 mg/m2 = 246 mg, Intravenous, Clinic/HOD 1 time, 12 of 12 cycles  Dose modification: 60 mg/m2 (original dose 80 mg/m2, Cycle 3), 55 mg/m2 (original dose 80 mg/m2, Cycle 7), 60 mg/m2 (original dose 80 mg/m2, Cycle 7), 50 mg/m2 (original dose 80 mg/m2, Cycle 9), 50 mg/m2 (original dose 80 mg/m2, Cycle 10)  Administration: 246 mg (7/24/2019), 246 mg (7/31/2019), 186 mg (8/7/2019), 186 mg (8/14/2019), 186 mg (8/21/2019), 186 mg (8/28/2019), 186 mg (9/4/2019), 174 mg (9/11/2019), 156 mg (9/18/2019), 156 mg (9/25/2019), 156 mg (10/1/2019), 156 mg (10/8/2019)     11/22/2019 Breast Surgery    On 11/22/19 Dr whyte performed a right breast mastectomy with SLN biopsy with pathology showing grade 2, 6 mm IDC with extensive treatment effect, no LVI with 1 LN positive 4 mm, negative margins. The on 12/17/19, Dr Whyte performed right axillary dissection with pathology still pending.     11/22/2019 Cancer Staged    ypT1b N1     12/17/2019 Breast Surgery    Surgery: Dr Shima Whyte, [phone] performed right ALND with pathology showing 14 negative lymph nodes.          1/14/2020 Tumor Conference       Post mastectomy radiation therapy: Xeloda, then possible Keytruda trial .     2/3/2020 - 3/23/2020 Radiation Therapy    Treating physician: Speedy Nair MD   Total Dose: 50.4 Gy to the chest wall and regional lymphatics  10 Gy boost to the prostate.   Fractions: 28 fractions at 1.8 Gy per fraction  5 fractions at 2 Gy per  fraction      2/28/2020 -  Chemotherapy     * 4/15/2020 - 9/28/20 completed 6 cycles adjuvant Xeloda 1000 mg/m2 bid days 1-14 every 21 days  Treatment Summary   Plan Name: OP CAPECITABINE 1250MG/M2 BID Q3W  Treatment Goal: Curative  Status: Active  Start Date: 3/20/2020  End Date: 3/20/2020  Provider: Richa Bahena MD  Chemotherapy: [No matching medication found in this treatment plan]           Medications    Current Outpatient Medications:     amLODIPine (NORVASC) 10 MG tablet, TAKE 1 TABLET (10 MG) BY MOUTH EVERY DAY, Disp: 90 tablet, Rfl: 3    atorvastatin (LIPITOR) 20 MG tablet, Take 1 tablet (20 mg total) by mouth once daily., Disp: 90 tablet, Rfl: 3    blood sugar diagnostic Strp, To check BG 4 times daily, to use with insurance preferred meter, Disp: 400 each, Rfl: 3    blood-glucose meter kit, Use as instructed, Disp: 1 each, Rfl: 0    capecitabine (XELODA) 500 MG Tab, Take 6 tablets (3,000 mg total) by mouth 2 (two) times daily Days 1-14 every 21 days., Disp: 168 tablet, Rfl: 6    dulaglutide (TRULICITY) 1.5 mg/0.5 mL PnIj, Inject 1.5 mg into the skin once a week. Trulicity 0.75mg weekly for 4 weeks & then increase to 1.5 mg weekly indefinitely., Disp: 6 mL, Rfl: 3    gabapentin (NEURONTIN) 300 MG capsule, Take 1 capsule (300 mg total) by mouth 3 (three) times daily., Disp: 90 capsule, Rfl: 11    hydroCHLOROthiazide (HYDRODIURIL) 25 MG tablet, TAKE 1 TABLET BY MOUTH ONCE DAILY, Disp: 90 tablet, Rfl: 3    lancets 33 gauge Misc, Use 2 (two) times daily with meals., Disp: 100 each, Rfl: 11    lancets 33 gauge Misc, To check BG 4 times daily, to use with insurance preferred meter, Disp: 200 each, Rfl: 11    lancing device Misc, 1 Device by Misc.(Non-Drug; Combo Route) route 2 (two) times daily with meals., Disp: 1 each, Rfl: 0    lidocaine-prilocaine (EMLA) cream, Apply topically to affected area 45 minutes before port access, Disp: 30 g, Rfl: 1    losartan (COZAAR) 50 MG tablet, Take 2 tablets  "by mouth once daily, Disp: 210 tablet, Rfl: 0    metFORMIN (GLUCOPHAGE) 500 MG tablet, Take 2 tablets (1,000 mg total) by mouth 2 (two) times daily with meals., Disp: 90 tablet, Rfl: 3    ondansetron (ZOFRAN) 8 MG tablet, Take 1 tablet (8 mg total) by mouth every 8 (eight) hours as needed for Nausea., Disp: 30 tablet, Rfl: 2    ondansetron (ZOFRAN-ODT) 8 MG TbDL, Take 1 tablet (8 mg total) by mouth every 12 (twelve) hours as needed., Disp: 20 tablet, Rfl: 1    pen needle, diabetic (BD ULTRA-FINE TANESHA PEN NEEDLE) 32 gauge x 5/32" Ndle, Use as directed with novolog and levemir, Disp: 100 each, Rfl: 5    senna (SENOKOT) 8.6 mg tablet, Take 1 tablet by mouth once daily., Disp: , Rfl:     tadalafiL (CIALIS) 5 MG tablet, Take 2 tablets (10 mg total) by mouth daily as needed for Erectile Dysfunction., Disp: 20 tablet, Rfl: 1  No current facility-administered medications for this visit.     Facility-Administered Medications Ordered in Other Visits:     heparin, porcine (PF) 100 unit/mL injection flush 500 Units, 500 Units, Intravenous, 1 time in Clinic/HOD, Richa Bahena MD    sodium chloride 0.9% flush 10 mL, 10 mL, Intravenous, 1 time in Clinic/HOD, Richa Bahena MD  Allergies  Review of patient's allergies indicates:  No Known Allergies  Review of Systems  Review of Systems   Constitutional: Negative for activity change, appetite change, chills, fatigue, fever and unexpected weight change.   HENT: Negative for congestion, hearing loss, nosebleeds, sinus pressure, sinus pain and trouble swallowing.    Eyes: Negative for pain, discharge, itching and visual disturbance.   Respiratory: Negative for cough, chest tightness and shortness of breath.    Cardiovascular: Negative for chest pain, palpitations and leg swelling.   Gastrointestinal: Negative for abdominal distention, abdominal pain, blood in stool, constipation, diarrhea, nausea, rectal pain and vomiting.   Endocrine: Negative for heat intolerance and " "polydipsia.   Genitourinary: Negative for difficulty urinating, dysuria, flank pain, frequency, hematuria and urgency.   Musculoskeletal: Negative for arthralgias, back pain and neck pain.   Skin: Negative for color change, pallor and rash.   Neurological: Negative for dizziness, numbness and headaches.   Hematological: Negative for adenopathy. Does not bruise/bleed easily.   Psychiatric/Behavioral: Negative for confusion, decreased concentration, dysphoric mood and sleep disturbance. The patient is not nervous/anxious.         Objective     Objective:     Vitals:    09/14/20 0958   BP: (!) 167/88   BP Location: Left arm   Patient Position: Sitting   BP Method: Large (Automatic)   Pulse: 72   Resp: 18   Temp: 97.9 °F (36.6 °C)   TempSrc: Oral   SpO2: 97%   Weight: (!) 183.9 kg (405 lb 6.8 oz)   Height: 6' 2" (1.88 m)     Physical Exam  Constitutional:       General: He is not in acute distress.     Appearance: He is well-developed. He is obese. He is not diaphoretic.   Eyes:      General: No scleral icterus.     Conjunctiva/sclera: Conjunctivae normal.   Neck:      Musculoskeletal: Neck supple.   Cardiovascular:      Rate and Rhythm: Normal rate and regular rhythm.      Heart sounds: Normal heart sounds. No murmur.   Pulmonary:      Effort: Pulmonary effort is normal. No respiratory distress.      Breath sounds: Normal breath sounds. No wheezing.      Comments: S/p right mastectomy. Left mediport  Abdominal:      General: Bowel sounds are normal. There is no distension.      Palpations: Abdomen is soft.      Tenderness: There is no abdominal tenderness.   Musculoskeletal: Normal range of motion.      Comments: Right UE edema with compression stocking   Skin:     General: Skin is warm and dry.      Findings: No rash.   Neurological:      Mental Status: He is alert and oriented to person, place, and time.   Psychiatric:         Behavior: Behavior normal.       Labs and Imaging  Results for orders placed or performed " in visit on 09/14/20   CBC Oncology   Result Value Ref Range    WBC 3.37 (L) 3.90 - 12.70 K/uL    RBC 4.55 (L) 4.60 - 6.20 M/uL    Hemoglobin 12.3 (L) 14.0 - 18.0 g/dL    Hematocrit 39.1 (L) 40.0 - 54.0 %    Mean Corpuscular Volume 86 82 - 98 fL    Mean Corpuscular Hemoglobin 27.0 27.0 - 31.0 pg    Mean Corpuscular Hemoglobin Conc 31.5 (L) 32.0 - 36.0 g/dL    RDW 17.7 (H) 11.5 - 14.5 %    Platelets 180 150 - 350 K/uL    MPV 10.0 9.2 - 12.9 fL    Gran # (ANC) 1.8 1.8 - 7.7 K/uL    Immature Grans (Abs) 0.01 0.00 - 0.04 K/uL   Comprehensive metabolic panel   Result Value Ref Range    Sodium 139 136 - 145 mmol/L    Potassium 4.1 3.5 - 5.1 mmol/L    Chloride 106 95 - 110 mmol/L    CO2 25 23 - 29 mmol/L    Glucose 99 70 - 110 mg/dL    BUN, Bld 14 6 - 20 mg/dL    Creatinine 1.2 0.5 - 1.4 mg/dL    Calcium 8.9 8.7 - 10.5 mg/dL    Total Protein 7.4 6.0 - 8.4 g/dL    Albumin 4.0 3.5 - 5.2 g/dL    Total Bilirubin 0.7 0.1 - 1.0 mg/dL    Alkaline Phosphatase 75 55 - 135 U/L    AST 26 10 - 40 U/L    ALT 39 10 - 44 U/L    Anion Gap 8 8 - 16 mmol/L    eGFR if African American >60.0 >60 mL/min/1.73 m^2    eGFR if non African American >60.0 >60 mL/min/1.73 m^2     Mammo Digital Diagnostic Bilat w/ Paul  Narrative: Result:  Mammo Digital Diagnostic Bilat w/ Paul    History:  History of right breast cancer, post right mastectomy. Screening for the   left breast.     Films Compared:  Prior images (if available) were compared.    Findings:  This procedure was performed using tomosynthesis. Computer-aided detection   was utilized in the interpretation of this examination.     The breasts are almost entirely fatty. There is no evidence of suspicious   masses, microcalcifications or architectural distortion.    No suspicious finding on either side. Right mastectomy and post-treatment   changes.  Impression: No mammographic evidence of malignancy.    BI-RADS Category 1: Negative    Recommendation:  Follow-up, as clinically  indicated.        Assessment     Assessment:     1. Malignant neoplasm involving both nipple and areola of right breast in male, estrogen receptor negative    2. Essential hypertension    3. Chemotherapy-induced neutropenia    4. Adjustment disorder with mixed anxiety and depressed mood    5. Morbid obesity with BMI of 50.0-59.9, adult        1. Stage IB ([id number]) invasive ductal carcinoma with lobular features of right breast, retroareolar, ER neg, NC neg, Her2 neg, Grade 2, Ki67 80%, diagnosed 5/2019   * Genetics negative   * 5/27/19 - 10/8/19 completed neoadjuvant ddAC followed by weekly Taxol.    * On 11/22/19 Dr Whyte performed a right breast mastectomy with SLN biopsy with pathology showing grade 2, 6 mm IDC with extensive treatment effect, no LVI with 1 LN positive 4 mm, negative margins. The on 12/17/19, Dr Whyte performed right axillary dissection with pathology no remaining malignancy.   * 2/3/2020 - 3/12/2020 completed RT   * 4/15/2020 - 9/28/20 completed 6 cycles adjuvant Xeloda 1000 mg/m2 bid days 1-14 every 21 days. Tolerating well     2. Microcytic anemia   * Has anemia as baseline but worsened with chemotherapy   * Monitoring, improving.    3. Leukopenia   * Mild, monitor.      4. HTN and Diabetes per PCP; diabetes improving - off insulin.     5.Chemo neuropathy   * Gabapentin 300 mg tid. Improving.     6. Lymphedema   * Sleeve in place    7. Hydrocele - working with urology - plans for correction when finished with Xeloda.     8. Morbid obesity - discussed weight loss    9. Of note, patient's wife is about 12-14 wks pregnant. Her OB is aware that her  was on Xeloda when conception occurred.   Plan:     1. Start last cycle of Xeloda 1000 mg/m2 days 1-14 every 21 days for 6 cycles. Discussed doing 8 cycles since tolerance is good, but he declines. Not a candidate for  due to timing.    2. Continue gabapentin 300 mg tid.   3. Diabetes management  4. Back to Dr Whyte for port removal.    5. OK to have hydrocele surgery in about 1 mo - will check cbc 10/1 to make sure ok to move forward with repair    No future appointments.    High risk med management

## 2020-09-30 ENCOUNTER — TELEPHONE (OUTPATIENT)
Dept: HEMATOLOGY/ONCOLOGY | Facility: CLINIC | Age: 45
End: 2020-09-30

## 2020-09-30 NOTE — TELEPHONE ENCOUNTER
Called pt to confirm appointment with Dr. Bahena 10/1 at 10 am. Pt did not answer, left a detailed message.

## 2020-10-01 ENCOUNTER — LAB VISIT (OUTPATIENT)
Dept: LAB | Facility: HOSPITAL | Age: 45
End: 2020-10-01
Attending: INTERNAL MEDICINE

## 2020-10-01 ENCOUNTER — TELEPHONE (OUTPATIENT)
Dept: HEMATOLOGY/ONCOLOGY | Facility: CLINIC | Age: 45
End: 2020-10-01

## 2020-10-01 ENCOUNTER — OFFICE VISIT (OUTPATIENT)
Dept: HEMATOLOGY/ONCOLOGY | Facility: CLINIC | Age: 45
End: 2020-10-01

## 2020-10-01 VITALS
SYSTOLIC BLOOD PRESSURE: 144 MMHG | RESPIRATION RATE: 16 BRPM | DIASTOLIC BLOOD PRESSURE: 75 MMHG | HEART RATE: 70 BPM | BODY MASS INDEX: 40.43 KG/M2 | WEIGHT: 315 LBS | HEIGHT: 74 IN | TEMPERATURE: 98 F | OXYGEN SATURATION: 97 %

## 2020-10-01 DIAGNOSIS — D64.81 ANTINEOPLASTIC CHEMOTHERAPY INDUCED ANEMIA: ICD-10-CM

## 2020-10-01 DIAGNOSIS — Z17.1 MALIGNANT NEOPLASM INVOLVING BOTH NIPPLE AND AREOLA OF RIGHT BREAST IN MALE, ESTROGEN RECEPTOR NEGATIVE: ICD-10-CM

## 2020-10-01 DIAGNOSIS — E66.01 MORBID OBESITY WITH BMI OF 50.0-59.9, ADULT: ICD-10-CM

## 2020-10-01 DIAGNOSIS — G62.0 CHEMOTHERAPY-INDUCED NEUROPATHY: ICD-10-CM

## 2020-10-01 DIAGNOSIS — T45.1X5A CHEMOTHERAPY-INDUCED NEUTROPENIA: ICD-10-CM

## 2020-10-01 DIAGNOSIS — C50.021 MALIGNANT NEOPLASM INVOLVING BOTH NIPPLE AND AREOLA OF RIGHT BREAST IN MALE, ESTROGEN RECEPTOR NEGATIVE: ICD-10-CM

## 2020-10-01 DIAGNOSIS — C50.021 MALIGNANT NEOPLASM INVOLVING BOTH NIPPLE AND AREOLA OF RIGHT BREAST IN MALE, ESTROGEN RECEPTOR NEGATIVE: Primary | ICD-10-CM

## 2020-10-01 DIAGNOSIS — D70.1 CHEMOTHERAPY-INDUCED NEUTROPENIA: ICD-10-CM

## 2020-10-01 DIAGNOSIS — I10 ESSENTIAL HYPERTENSION: ICD-10-CM

## 2020-10-01 DIAGNOSIS — T45.1X5A CHEMOTHERAPY-INDUCED NEUROPATHY: ICD-10-CM

## 2020-10-01 DIAGNOSIS — T45.1X5A ANTINEOPLASTIC CHEMOTHERAPY INDUCED ANEMIA: ICD-10-CM

## 2020-10-01 DIAGNOSIS — Z17.1 MALIGNANT NEOPLASM INVOLVING BOTH NIPPLE AND AREOLA OF RIGHT BREAST IN MALE, ESTROGEN RECEPTOR NEGATIVE: Primary | ICD-10-CM

## 2020-10-01 LAB
ERYTHROCYTE [DISTWIDTH] IN BLOOD BY AUTOMATED COUNT: 16.4 % (ref 11.5–14.5)
HCT VFR BLD AUTO: 40.1 % (ref 40–54)
HGB BLD-MCNC: 13 G/DL (ref 14–18)
IMM GRANULOCYTES # BLD AUTO: 0.01 K/UL (ref 0–0.04)
MCH RBC QN AUTO: 27.5 PG (ref 27–31)
MCHC RBC AUTO-ENTMCNC: 32.4 G/DL (ref 32–36)
MCV RBC AUTO: 85 FL (ref 82–98)
NEUTROPHILS # BLD AUTO: 1.7 K/UL (ref 1.8–7.7)
PLATELET # BLD AUTO: 169 K/UL (ref 150–350)
PMV BLD AUTO: 10.5 FL (ref 9.2–12.9)
RBC # BLD AUTO: 4.72 M/UL (ref 4.6–6.2)
WBC # BLD AUTO: 3.25 K/UL (ref 3.9–12.7)

## 2020-10-01 PROCEDURE — 85027 COMPLETE CBC AUTOMATED: CPT

## 2020-10-01 PROCEDURE — 99999 PR PBB SHADOW E&M-EST. PATIENT-LVL V: ICD-10-PCS | Mod: PBBFAC,,, | Performed by: INTERNAL MEDICINE

## 2020-10-01 PROCEDURE — 36415 COLL VENOUS BLD VENIPUNCTURE: CPT

## 2020-10-01 PROCEDURE — 99213 OFFICE O/P EST LOW 20 MIN: CPT | Mod: S$PBB,,, | Performed by: INTERNAL MEDICINE

## 2020-10-01 PROCEDURE — 99213 PR OFFICE/OUTPT VISIT, EST, LEVL III, 20-29 MIN: ICD-10-PCS | Mod: S$PBB,,, | Performed by: INTERNAL MEDICINE

## 2020-10-01 PROCEDURE — 99999 PR PBB SHADOW E&M-EST. PATIENT-LVL V: CPT | Mod: PBBFAC,,, | Performed by: INTERNAL MEDICINE

## 2020-10-01 PROCEDURE — 99215 OFFICE O/P EST HI 40 MIN: CPT | Mod: PBBFAC | Performed by: INTERNAL MEDICINE

## 2020-10-01 NOTE — TELEPHONE ENCOUNTER
----- Message from Richa Bahena MD sent at 10/1/2020 10:09 AM CDT -----  Back to Dr Whyte for port removal.Dr Linn in 3 mo  
27-Oct-2019 04:48

## 2020-10-01 NOTE — PROGRESS NOTES
History:     Reason For Follow Up:   1. Stage IB ([id number]) triple negative invasive ductal carcinoma with lobular features of right breast, retroareolar, Grade 2, Ki67 80%, diagnosed 5/2019    HPI:   Paul Li Jr. presents for follow up of breast cancer. He completed Xeloda. His wife is 14 wks pregnant. Neuropathy stable to improving. Energy improving.     Onc History:  Oncology History   Malignant neoplasm involving both nipple and areola of right breast in male, estrogen receptor negative   5/1/2019 Imaging Significant Findings    Mammogram and US  Impression:  Right  Mass: Right breast 14 mm mass at the retroareolar anterior position. Assessment: 4 - Suspicious finding. Biopsy is recommended.      Left  There is no mammographic or sonographic evidence of malignancy.          Recommendation:  Biopsy is recommended.     5/2/2019 Biopsy    BREAST, RIGHT, RETROAREOLAR MASS, ULTRASOUND-GUIDED BIOPSY:  Invasive ductal carcinoma with lobular features.  Size of invasive carcinoma: 5 MM in greatest linear dimension within a single     5/2/2019 Breast Tumor Markers    Estrogen: Negative  Progesterone: Negative  HER2: Negative  Ki67: 80     5/17/2019 Cancer Staged    Staging form: Breast, AJCC 8th Edition  - Clinical stage from 5/17/2019: Stage IB (cT1c, cN0, cM0, G2, ER-, UT-, HER2-) - Signed by Richa Bahena MD on 5/17/2019 5/27/2019 - 7/21/2019 Chemotherapy    Treatment Summary   Plan Name: OP BREAST DOSE-DENSE AC - DOXORUBICIN CYCLOPHOSPHAMIDE Q2W  Treatment Goal: Curative  Status: Inactive  Start Date: 5/27/2019  End Date: 7/9/2019  Provider: Richa Bahena MD  Chemotherapy: DOXOrubicin chemo injection 186 mg, 60 mg/m2 = 186 mg, Intravenous, Clinic/HOD 1 time, 4 of 4 cycles  Administration: 186 mg (5/27/2019), 186 mg (6/10/2019), 186 mg (6/24/2019), 186 mg (7/8/2019)  cyclophosphamide (CYTOXAN) 600 mg/m2 = 1,865 mg in sodium chloride 0.9% 250 mL chemo infusion, 600 mg/m2 = 1,865 mg, Intravenous,  Clinic/HOD 1 time, 4 of 4 cycles  Administration: 1,865 mg (5/27/2019), 1,865 mg (6/10/2019), 1,865 mg (6/24/2019), 1,865 mg (7/8/2019)     7/22/2019 - 10/15/2019 Chemotherapy    Treatment Summary   Plan Name: OP PACLITAXEL QW  Treatment Goal: Curative  Status: Inactive  Start Date: 7/24/2019  End Date: 10/8/2019  Provider: Richa Bahena MD  Chemotherapy: PACLitaxel (TAXOL) 80 mg/m2 = 246 mg in sodium chloride 0.9% 250 mL chemo infusion, 80 mg/m2 = 246 mg, Intravenous, Clinic/HOD 1 time, 12 of 12 cycles  Dose modification: 60 mg/m2 (original dose 80 mg/m2, Cycle 3), 55 mg/m2 (original dose 80 mg/m2, Cycle 7), 60 mg/m2 (original dose 80 mg/m2, Cycle 7), 50 mg/m2 (original dose 80 mg/m2, Cycle 9), 50 mg/m2 (original dose 80 mg/m2, Cycle 10)  Administration: 246 mg (7/24/2019), 246 mg (7/31/2019), 186 mg (8/7/2019), 186 mg (8/14/2019), 186 mg (8/21/2019), 186 mg (8/28/2019), 186 mg (9/4/2019), 174 mg (9/11/2019), 156 mg (9/18/2019), 156 mg (9/25/2019), 156 mg (10/1/2019), 156 mg (10/8/2019)     11/22/2019 Breast Surgery    On 11/22/19 Dr whyte performed a right breast mastectomy with SLN biopsy with pathology showing grade 2, 6 mm IDC with extensive treatment effect, no LVI with 1 LN positive 4 mm, negative margins. The on 12/17/19, Dr Whyte performed right axillary dissection with pathology still pending.     11/22/2019 Cancer Staged    ypT1b N1     12/17/2019 Breast Surgery    Surgery: Dr Shima Whyte, [phone] performed right ALND with pathology showing 14 negative lymph nodes.          1/14/2020 Tumor Conference       Post mastectomy radiation therapy: Xeloda, then possible Keytruda trial .     2/3/2020 - 3/23/2020 Radiation Therapy    Treating physician: Speedy Nair MD   Total Dose: 50.4 Gy to the chest wall and regional lymphatics  10 Gy boost to the prostate.   Fractions: 28 fractions at 1.8 Gy per fraction  5 fractions at 2 Gy per fraction      2/28/2020 -  Chemotherapy     * 4/15/2020 -  9/28/20 completed 6 cycles adjuvant Xeloda 1000 mg/m2 bid days 1-14 every 21 days  Treatment Summary   Plan Name: OP CAPECITABINE 1250MG/M2 BID Q3W  Treatment Goal: Curative  Status: Active  Start Date: 3/20/2020  End Date: 3/20/2020  Provider: Richa Bahena MD  Chemotherapy: [No matching medication found in this treatment plan]           Medications    Current Outpatient Medications:     amLODIPine (NORVASC) 10 MG tablet, TAKE 1 TABLET (10 MG) BY MOUTH EVERY DAY, Disp: 90 tablet, Rfl: 3    atorvastatin (LIPITOR) 20 MG tablet, Take 1 tablet (20 mg total) by mouth once daily., Disp: 90 tablet, Rfl: 3    blood sugar diagnostic Strp, To check BG 4 times daily, to use with insurance preferred meter, Disp: 400 each, Rfl: 3    blood-glucose meter kit, Use as instructed, Disp: 1 each, Rfl: 0    capecitabine (XELODA) 500 MG Tab, Take 6 tablets (3,000 mg total) by mouth 2 (two) times daily Days 1-14 every 21 days., Disp: 168 tablet, Rfl: 6    dulaglutide (TRULICITY) 1.5 mg/0.5 mL PnIj, Inject 1.5 mg into the skin once a week. Trulicity 0.75mg weekly for 4 weeks & then increase to 1.5 mg weekly indefinitely., Disp: 6 mL, Rfl: 3    gabapentin (NEURONTIN) 300 MG capsule, Take 1 capsule (300 mg total) by mouth 3 (three) times daily., Disp: 90 capsule, Rfl: 11    hydroCHLOROthiazide (HYDRODIURIL) 25 MG tablet, TAKE 1 TABLET BY MOUTH ONCE DAILY, Disp: 90 tablet, Rfl: 3    lancets 33 gauge Misc, Use 2 (two) times daily with meals., Disp: 100 each, Rfl: 11    lancets 33 gauge Misc, To check BG 4 times daily, to use with insurance preferred meter, Disp: 200 each, Rfl: 11    lancing device Misc, 1 Device by Misc.(Non-Drug; Combo Route) route 2 (two) times daily with meals., Disp: 1 each, Rfl: 0    lidocaine-prilocaine (EMLA) cream, Apply topically to affected area 45 minutes before port access, Disp: 30 g, Rfl: 1    losartan (COZAAR) 50 MG tablet, Take 2 tablets by mouth once daily, Disp: 210 tablet, Rfl: 0     "metFORMIN (GLUCOPHAGE) 500 MG tablet, Take 2 tablets (1,000 mg total) by mouth 2 (two) times daily with meals., Disp: 90 tablet, Rfl: 3    ondansetron (ZOFRAN) 8 MG tablet, Take 1 tablet (8 mg total) by mouth every 8 (eight) hours as needed for Nausea., Disp: 30 tablet, Rfl: 2    ondansetron (ZOFRAN-ODT) 8 MG TbDL, Take 1 tablet (8 mg total) by mouth every 12 (twelve) hours as needed., Disp: 20 tablet, Rfl: 1    pen needle, diabetic (BD ULTRA-FINE TANESHA PEN NEEDLE) 32 gauge x 5/32" Ndle, Use as directed with novolog and levemir, Disp: 100 each, Rfl: 5    senna (SENOKOT) 8.6 mg tablet, Take 1 tablet by mouth once daily., Disp: , Rfl:     tadalafiL (CIALIS) 5 MG tablet, Take 2 tablets (10 mg total) by mouth daily as needed for Erectile Dysfunction., Disp: 20 tablet, Rfl: 1  No current facility-administered medications for this visit.     Facility-Administered Medications Ordered in Other Visits:     heparin, porcine (PF) 100 unit/mL injection flush 500 Units, 500 Units, Intravenous, 1 time in Clinic/HOD, Richa Bahena MD    sodium chloride 0.9% flush 10 mL, 10 mL, Intravenous, 1 time in Clinic/HOD, Richa Bahena MD  Allergies  Review of patient's allergies indicates:  No Known Allergies  Review of Systems  Review of Systems   Constitutional: Negative for activity change, appetite change, chills, fatigue, fever and unexpected weight change.   HENT: Negative for congestion, hearing loss, nosebleeds, sinus pressure, sinus pain and trouble swallowing.    Eyes: Negative for pain, discharge, itching and visual disturbance.   Respiratory: Negative for cough, chest tightness and shortness of breath.    Cardiovascular: Negative for chest pain, palpitations and leg swelling.   Gastrointestinal: Negative for abdominal distention, abdominal pain, blood in stool, constipation, diarrhea, nausea, rectal pain and vomiting.   Endocrine: Negative for heat intolerance and polydipsia.   Genitourinary: Negative for difficulty " "urinating, dysuria, flank pain, frequency, hematuria and urgency.   Musculoskeletal: Negative for arthralgias, back pain and neck pain.   Skin: Negative for color change, pallor and rash.   Neurological: Negative for dizziness, numbness and headaches.   Hematological: Negative for adenopathy. Does not bruise/bleed easily.   Psychiatric/Behavioral: Negative for confusion, decreased concentration, dysphoric mood and sleep disturbance. The patient is not nervous/anxious.         Objective     Objective:     Vitals:    10/01/20 0946   BP: (!) 144/75   BP Location: Left arm   Patient Position: Sitting   BP Method: Large (Automatic)   Pulse: 70   Resp: 16   Temp: 98.4 °F (36.9 °C)   TempSrc: Oral   SpO2: 97%   Weight: (!) 181.8 kg (400 lb 12.7 oz)   Height: 6' 2" (1.88 m)     Physical Exam  Constitutional:       General: He is not in acute distress.     Appearance: He is well-developed. He is obese. He is not diaphoretic.   Eyes:      General: No scleral icterus.     Conjunctiva/sclera: Conjunctivae normal.   Neck:      Musculoskeletal: Neck supple.   Cardiovascular:      Rate and Rhythm: Normal rate and regular rhythm.      Heart sounds: Normal heart sounds. No murmur.   Pulmonary:      Effort: Pulmonary effort is normal. No respiratory distress.      Breath sounds: Normal breath sounds. No wheezing.      Comments: S/p right mastectomy without palpable masses. Left mediport  Abdominal:      General: Bowel sounds are normal. There is no distension.      Palpations: Abdomen is soft.      Tenderness: There is no abdominal tenderness.   Musculoskeletal: Normal range of motion.      Comments: Right UE edema with compression stocking   Skin:     General: Skin is warm and dry.      Findings: No rash.   Neurological:      Mental Status: He is alert and oriented to person, place, and time.   Psychiatric:         Behavior: Behavior normal.       Labs and Imaging  Results for orders placed or performed in visit on 10/01/20   CBC " Oncology   Result Value Ref Range    WBC 3.25 (L) 3.90 - 12.70 K/uL    RBC 4.72 4.60 - 6.20 M/uL    Hemoglobin 13.0 (L) 14.0 - 18.0 g/dL    Hematocrit 40.1 40.0 - 54.0 %    Mean Corpuscular Volume 85 82 - 98 fL    Mean Corpuscular Hemoglobin 27.5 27.0 - 31.0 pg    Mean Corpuscular Hemoglobin Conc 32.4 32.0 - 36.0 g/dL    RDW 16.4 (H) 11.5 - 14.5 %    Platelets 169 150 - 350 K/uL    MPV 10.5 9.2 - 12.9 fL    Gran # (ANC) 1.7 (L) 1.8 - 7.7 K/uL    Immature Grans (Abs) 0.01 0.00 - 0.04 K/uL     Mammo Digital Diagnostic Bilat w/ Paul  Narrative: Result:  Mammo Digital Diagnostic Bilat w/ Paul    History:  History of right breast cancer, post right mastectomy. Screening for the   left breast.     Films Compared:  Prior images (if available) were compared.    Findings:  This procedure was performed using tomosynthesis. Computer-aided detection   was utilized in the interpretation of this examination.     The breasts are almost entirely fatty. There is no evidence of suspicious   masses, microcalcifications or architectural distortion.    No suspicious finding on either side. Right mastectomy and post-treatment   changes.  Impression: No mammographic evidence of malignancy.    BI-RADS Category 1: Negative    Recommendation:  Follow-up, as clinically indicated.        Assessment     Assessment:     1. Malignant neoplasm involving both nipple and areola of right breast in male, estrogen receptor negative    2. Antineoplastic chemotherapy induced anemia    3. Chemotherapy-induced neutropenia    4. Essential hypertension    5. Morbid obesity with BMI of 50.0-59.9, adult    6. Chemotherapy-induced neuropathy        1. Stage IB ([id number]) invasive ductal carcinoma with lobular features of right breast, retroareolar, ER neg, DE neg, Her2 neg, Grade 2, Ki67 80%, diagnosed 5/2019   * Genetics negative   * 5/27/19 - 10/8/19 completed neoadjuvant ddAC followed by weekly Taxol.    * On 11/22/19 Dr Whyte performed a right breast  mastectomy with SLN biopsy with pathology showing grade 2, 6 mm IDC with extensive treatment effect, no LVI with 1 LN positive 4 mm, negative margins. The on 12/17/19, Dr Whyte performed right axillary dissection with pathology no remaining malignancy.   * 2/3/2020 - 3/12/2020 completed RT   * 4/15/2020 - 9/28/20 completed 6 cycles adjuvant Xeloda 1000 mg/m2 bid days 1-14 every 21 days.    * Currently no evidence of disease     2. Chemo related anemia   * Improving.     3. Leukopenia   * Mild, monitor.      4. HTN and Diabetes per PCP; diabetes improving - off insulin.     5.Chemo neuropathy   * Gabapentin 300 mg tid. Improving.     6. Lymphedema   * Sleeve in place    7. Hydrocele - working with urology - plans for correction now that off Xeloda    8. Morbid obesity - discussed weight loss    9. Of note, patient's wife is about 14 wks pregnant. Her OB is aware that her  was on Xeloda when conception occurred.   Plan:     1. Continue gabapentin 300 mg tid.   2. Diabetes management  3. Back to Dr Whyte for port removal. Mammo 7/2021  4. OK to have hydrocele surgery  Patient was instructed on the importance of physical activity, healthy diet, and self breast exams.     Follow up with Dr Linn in 3 mo    No future appointments.

## 2020-10-05 ENCOUNTER — PATIENT MESSAGE (OUTPATIENT)
Dept: ADMINISTRATIVE | Facility: HOSPITAL | Age: 45
End: 2020-10-05

## 2020-10-07 ENCOUNTER — PATIENT OUTREACH (OUTPATIENT)
Dept: ADMINISTRATIVE | Facility: OTHER | Age: 45
End: 2020-10-07

## 2020-10-07 NOTE — PROGRESS NOTES
Health Maintenance Due   Topic Date Due    Pneumococcal Vaccine (Highest Risk) (1 of 3 - PCV13) 10/17/1996    Hemoglobin A1c  04/02/2020    Influenza Vaccine (1) 08/01/2020     Updates were requested from care everywhere.  Chart was reviewed for overdue Proactive [facility] Encounters ([facility]) topics (CRS, Breast Cancer Screening, Eye exam)  Health Maintenance has been updated.  LINKS immunization registry triggered.  Immunizations were reconciled.

## 2020-10-08 ENCOUNTER — OFFICE VISIT (OUTPATIENT)
Dept: UROLOGY | Facility: CLINIC | Age: 45
End: 2020-10-08

## 2020-10-08 VITALS — BODY MASS INDEX: 40.43 KG/M2 | HEIGHT: 74 IN | WEIGHT: 315 LBS

## 2020-10-08 DIAGNOSIS — N43.3 HYDROCELE: ICD-10-CM

## 2020-10-08 DIAGNOSIS — N43.3 HYDROCELE IN ADULT: Primary | ICD-10-CM

## 2020-10-08 DIAGNOSIS — Z01.818 PRE-OP EXAM: Primary | ICD-10-CM

## 2020-10-08 DIAGNOSIS — Z30.2 ENCOUNTER FOR STERILIZATION: ICD-10-CM

## 2020-10-08 DIAGNOSIS — Z30.09 VASECTOMY EVALUATION: ICD-10-CM

## 2020-10-08 DIAGNOSIS — N43.3 HYDROCELE IN ADULT: ICD-10-CM

## 2020-10-08 PROCEDURE — 99999 PR PBB SHADOW E&M-EST. PATIENT-LVL IV: ICD-10-PCS | Mod: PBBFAC,,, | Performed by: UROLOGY

## 2020-10-08 PROCEDURE — 99214 OFFICE O/P EST MOD 30 MIN: CPT | Mod: PBBFAC,PO | Performed by: UROLOGY

## 2020-10-08 PROCEDURE — 99214 PR OFFICE/OUTPT VISIT, EST, LEVL IV, 30-39 MIN: ICD-10-PCS | Mod: S$PBB,,, | Performed by: UROLOGY

## 2020-10-08 PROCEDURE — 99214 OFFICE O/P EST MOD 30 MIN: CPT | Mod: S$PBB,,, | Performed by: UROLOGY

## 2020-10-08 PROCEDURE — 99999 PR PBB SHADOW E&M-EST. PATIENT-LVL IV: CPT | Mod: PBBFAC,,, | Performed by: UROLOGY

## 2020-10-08 NOTE — PROGRESS NOTES
Subjective:       Patient ID: Paul Li Jr. is a 44 y.o. male.    Chief Complaint: Other (discuss hydrocele surgery)    HPI     44-year-old with a large right hydrocele.  We previously scheduled surgical repair but he was diagnosed with breast cancer and surgery was delayed for treatment of his cancer which is now complete.  He wants to proceed with hydrocele repair.  He also wants to proceed with bilateral vasectomy.  We discussed the procedure at length.  Discussed expected post procedure pain and limitations.  He understands the vasectomy will result in permanent sterility.    Review of Systems   Constitutional: Negative for fever.   Genitourinary: Negative for dysuria and hematuria.       Objective:      Physical Exam  Vitals signs reviewed.   Constitutional:       Appearance: He is well-developed.   HENT:      Head: Normocephalic and atraumatic.   Eyes:      Conjunctiva/sclera: Conjunctivae normal.   Cardiovascular:      Rate and Rhythm: Normal rate.   Pulmonary:      Effort: Pulmonary effort is normal.   Genitourinary:     Penis: Normal.       Scrotum/Testes:         Right: Testicular hydrocele present.         Left: Mass not present.      Comments: Left vas is palpable  Musculoskeletal: Normal range of motion.   Skin:     General: Skin is warm and dry.      Findings: No rash.   Neurological:      Mental Status: He is alert and oriented to person, place, and time.         Assessment:       1. Hydrocele in adult    2. Vasectomy evaluation        Plan:       Hydrocele in adult    Vasectomy evaluation      we will proceed with surgical repair of his right hydrocele long with bilateral vasectomy.  We did discuss drain placement as well

## 2020-10-27 ENCOUNTER — TELEPHONE (OUTPATIENT)
Dept: ENDOCRINOLOGY | Facility: CLINIC | Age: 45
End: 2020-10-27

## 2020-10-27 ENCOUNTER — PATIENT MESSAGE (OUTPATIENT)
Dept: HEMATOLOGY/ONCOLOGY | Facility: CLINIC | Age: 45
End: 2020-10-27

## 2020-10-27 ENCOUNTER — PATIENT MESSAGE (OUTPATIENT)
Dept: ENDOCRINOLOGY | Facility: CLINIC | Age: 45
End: 2020-10-27

## 2020-10-27 DIAGNOSIS — E11.65 UNCONTROLLED TYPE 2 DIABETES MELLITUS WITH HYPERGLYCEMIA: Primary | ICD-10-CM

## 2020-10-27 NOTE — PROGRESS NOTES
Subjective:      Patient ID: Paul Li Jr. is a 45 y.o. male.    Chief Complaint:  Diabetes    History of Present Illness  Paul Li Jr. with  DM2, HTN and obesity presents today for follow up of Type 2 DM. Previous patient of Dr. Elaine and Dr. Shipman. Last seen in April 2020.     Has right breast Stage 1B triple negative invasive ductal carcinoma s/p chemotherapy. Developed diabetes while receiving chemotherapy and dexamethasone. He has completed chemotherapy. He had 14 lymph nodes excised in Dec 2019 all negative pathology.  Had radiation in Feb 2020. He saw oncology and was cleared.     He is no longer on steroids since mid Oct 2019.   Will have hydrocele surgery in Dec 2020.     With regards to the diabetes:    Diagnosed: 10/2019.   Family History of Type 2 DM: mother   Known complications:  DKA/HHS: none  RN: denies; due in Nov 2020  PN: Has neuropathy secondary to chemotherapy.  Nephropathy: -; On ARB.  CAD: none    Current regimen:  Trulicity 1.5 mg weekly  Metformin 1000 mg twice daily     Missed doses- Reports full compliance with diabetes regimen.    Other medications tried:   MDI  Jardiance-stopped with better blood sugars    1-2 # times a day every other day   Log reviewed: none Oral recall, forgot BG log  BGs ranging     Denies any hypoglycemia.  Yes, did not need assistance   Knows how to correct with 15 grams of carbs- juice, coke, or a peppermint.     Dietary recall: Eats 1-2 regular meals daily, skipping breakfast.  L: roast beef sandwich  D: hot dogs; mostly cooked at home  Snacks: chips or pickle  Drinks: water     Exercise - tried to stay active. He started walking around Dec 2019.     States that weight is bouncing around. He would like to lose weight. He has considered gastric sleeve but did not where to start.       Education - last visit: 11/4/2019    Has a Medic alert tag-none  Glucagon/Baqsimi-none  [facility]     Diabetes Management  Status  Statin: Taking  ACE/ARB: Taking    Denies personal history of pancreatitis or family history of thyroid cancer.     Lab Results   Component Value Date    HGBA1C 5.6 10/28/2020    HGBA1C 6.2 (H) 01/02/2020    HGBA1C 9.8 (H) 10/10/2019     Screening or Prevention Patient's value Goal Complete/Controlled?   HgA1C Testing and Control   Lab Results   Component Value Date    HGBA1C 5.6 10/28/2020      Annually/Less than 8% Yes   Lipid profile : 06/15/2018 Annually No   LDL control Lab Results   Component Value Date    LDLCALC 113.6 10/28/2020    Annually/Less than 100 mg/dl  No   Nephropathy screening Lab Results   Component Value Date    LABMICR 10.0 01/02/2020     Lab Results   Component Value Date    PROTEINUA Negative 10/10/2019    Annually Yes   Blood pressure BP Readings from Last 1 Encounters:   10/28/20 138/80    Less than 140/90 Yes   Dilated retinal exam : 11/11/2019 Annually Yes   Foot exam   : 11/06/2019 Annually Yes     Review of Systems   Constitutional: Negative for fatigue.   Eyes: Negative for visual disturbance.   Respiratory: Negative for shortness of breath.    Cardiovascular: Negative for chest pain.   Gastrointestinal: Negative for abdominal pain.   Endocrine: Negative for polydipsia, polyphagia and polyuria.   Musculoskeletal: Negative for arthralgias.   Skin: Negative for wound.   Neurological: Negative for headaches.   Hematological: Does not bruise/bleed easily.   Psychiatric/Behavioral: Negative for sleep disturbance.     Objective:   Physical Exam  Vitals signs reviewed.   Constitutional:       Appearance: He is well-developed.   Neck:      Thyroid: No thyromegaly.   Cardiovascular:      Rate and Rhythm: Normal rate.   Pulmonary:      Effort: Pulmonary effort is normal.   Abdominal:      Palpations: Abdomen is soft.   Skin:     Comments: 1st Toenails lifting bilaterally     injection sites are without edema or erythema. No lipo hypertropthy or atrophy  Diabetes Foot Exam:   Denies sores  "or lesions to bilateral feet  Shoes appropriate  DM foot exam deferred; done in Nov 2019    Visit Vitals  /80   Ht 6' 2" (1.88 m)   Wt (!) 182.2 kg (401 lb 9.1 oz)   BMI 51.56 kg/m²        Lab Review:   Lab Results   Component Value Date    HGBA1C 5.6 10/28/2020    HGBA1C 6.2 (H) 01/02/2020    HGBA1C 9.8 (H) 10/10/2019      Lab Results   Component Value Date    CHOL 159 10/28/2020    HDL 32 (L) 10/28/2020    LDLCALC 113.6 10/28/2020    TRIG 67 10/28/2020    CHOLHDL 20.1 10/28/2020     Lab Results   Component Value Date     10/28/2020    K 4.3 10/28/2020     10/28/2020    CO2 27 10/28/2020    GLU 96 10/28/2020    BUN 15 10/28/2020    CREATININE 1.4 10/28/2020    CALCIUM 9.6 10/28/2020    PROT 7.7 10/28/2020    ALBUMIN 4.0 10/28/2020    BILITOT 0.7 10/28/2020    ALKPHOS 84 10/28/2020    AST 29 10/28/2020    ALT 41 10/28/2020    ANIONGAP 8 10/28/2020    ESTGFRAFRICA >60.0 10/28/2020    EGFRNONAA >60.0 10/28/2020    TSH 0.898 06/15/2018     No results found for: [id number]  Assessment and Plan     1. Uncontrolled type 2 diabetes mellitus with hyperglycemia  Ambulatory referral/consult to Podiatry    Comprehensive Metabolic Panel    Hemoglobin A1C   2. Class 3 severe obesity due to excess calories with serious comorbidity and body mass index (BMI) of 50.0 to 59.9 in adult  Ambulatory referral/consult to Bariatric Medicine   3. Essential hypertension     4. Leukopenia due to antineoplastic chemotherapy  Vitamin B12   5. Type 2 diabetes mellitus without complication, without long-term current use of insulin         Type 2 diabetes mellitus without complication  Significant improvement in glucose now that he is no longer on steroids and with lifestyle changes  Continue Trulicity 1.5 mg weekly and metformin a 1000 mg b.i.d.  Monitor glucose occasionally and will let me know significant elevation  Labs before he returns to clinic  Consult podiatry     Class 3 severe obesity due to excess calories with " serious comorbidity and body mass index (BMI) of 50.0 to 59.9 in adult  Discussed dietary modification, encouraged him to continue exercise  Will continue Trulicity as above for potential weight benefit  Consult bariatric medicine     Essential hypertension  Continue current medications    Leukopenia due to antineoplastic chemotherapy  Will check B12 with next lab to ensure this is not contributing    Follow up in about 6 months (around 4/28/2021).

## 2020-10-27 NOTE — PROGRESS NOTES
Called patient. Discussed he is not on insulin. Discussed Trulicity is not insulin. 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 Will order labs for tomorrow's appointment.

## 2020-10-27 NOTE — ASSESSMENT & PLAN NOTE
Discussed dietary modification, encouraged him to continue exercise  Will continue Trulicity as above for potential weight benefit  Consult bariatric medicine

## 2020-10-27 NOTE — ASSESSMENT & PLAN NOTE
Significant improvement in glucose now that he is no longer on steroids and with lifestyle changes  Continue Trulicity 1.5 mg weekly and metformin a 1000 mg b.i.d.  Monitor glucose occasionally and will let me know significant elevation  Labs before he returns to clinic  Consult podiatry

## 2020-10-28 ENCOUNTER — LAB VISIT (OUTPATIENT)
Dept: LAB | Facility: HOSPITAL | Age: 45
End: 2020-10-28

## 2020-10-28 ENCOUNTER — OFFICE VISIT (OUTPATIENT)
Dept: ENDOCRINOLOGY | Facility: CLINIC | Age: 45
End: 2020-10-28

## 2020-10-28 ENCOUNTER — PATIENT MESSAGE (OUTPATIENT)
Dept: ENDOCRINOLOGY | Facility: CLINIC | Age: 45
End: 2020-10-28

## 2020-10-28 ENCOUNTER — PATIENT MESSAGE (OUTPATIENT)
Dept: HEMATOLOGY/ONCOLOGY | Facility: CLINIC | Age: 45
End: 2020-10-28

## 2020-10-28 VITALS
BODY MASS INDEX: 40.43 KG/M2 | HEIGHT: 74 IN | WEIGHT: 315 LBS | DIASTOLIC BLOOD PRESSURE: 80 MMHG | SYSTOLIC BLOOD PRESSURE: 138 MMHG

## 2020-10-28 DIAGNOSIS — D70.1 LEUKOPENIA DUE TO ANTINEOPLASTIC CHEMOTHERAPY: ICD-10-CM

## 2020-10-28 DIAGNOSIS — E11.65 UNCONTROLLED TYPE 2 DIABETES MELLITUS WITH HYPERGLYCEMIA: Primary | ICD-10-CM

## 2020-10-28 DIAGNOSIS — E55.9 VITAMIN D DEFICIENCY: Primary | ICD-10-CM

## 2020-10-28 DIAGNOSIS — E11.9 TYPE 2 DIABETES MELLITUS WITHOUT COMPLICATION, WITHOUT LONG-TERM CURRENT USE OF INSULIN: ICD-10-CM

## 2020-10-28 DIAGNOSIS — E66.01 CLASS 3 SEVERE OBESITY DUE TO EXCESS CALORIES WITH SERIOUS COMORBIDITY AND BODY MASS INDEX (BMI) OF 50.0 TO 59.9 IN ADULT: ICD-10-CM

## 2020-10-28 DIAGNOSIS — I10 ESSENTIAL HYPERTENSION: ICD-10-CM

## 2020-10-28 DIAGNOSIS — E11.65 UNCONTROLLED TYPE 2 DIABETES MELLITUS WITH HYPERGLYCEMIA: ICD-10-CM

## 2020-10-28 DIAGNOSIS — T45.1X5A LEUKOPENIA DUE TO ANTINEOPLASTIC CHEMOTHERAPY: ICD-10-CM

## 2020-10-28 LAB
CHOLEST SERPL-MCNC: 159 MG/DL (ref 120–199)
CHOLEST/HDLC SERPL: 5 {RATIO} (ref 2–5)
HDLC SERPL-MCNC: 32 MG/DL (ref 40–75)
HDLC SERPL: 20.1 % (ref 20–50)
LDLC SERPL CALC-MCNC: 113.6 MG/DL (ref 63–159)
NONHDLC SERPL-MCNC: 127 MG/DL
TRIGL SERPL-MCNC: 67 MG/DL (ref 30–150)

## 2020-10-28 PROCEDURE — 99214 OFFICE O/P EST MOD 30 MIN: CPT | Mod: S$PBB,,, | Performed by: NURSE PRACTITIONER

## 2020-10-28 PROCEDURE — 80061 LIPID PANEL: CPT

## 2020-10-28 PROCEDURE — 99215 OFFICE O/P EST HI 40 MIN: CPT | Mod: PBBFAC | Performed by: NURSE PRACTITIONER

## 2020-10-28 PROCEDURE — 99999 PR PBB SHADOW E&M-EST. PATIENT-LVL V: ICD-10-PCS | Mod: PBBFAC,,, | Performed by: NURSE PRACTITIONER

## 2020-10-28 PROCEDURE — 99214 PR OFFICE/OUTPT VISIT, EST, LEVL IV, 30-39 MIN: ICD-10-PCS | Mod: S$PBB,,, | Performed by: NURSE PRACTITIONER

## 2020-10-28 PROCEDURE — 99999 PR PBB SHADOW E&M-EST. PATIENT-LVL V: CPT | Mod: PBBFAC,,, | Performed by: NURSE PRACTITIONER

## 2020-10-28 RX ORDER — ERGOCALCIFEROL 1.25 MG/1
50000 CAPSULE ORAL
Qty: 12 CAPSULE | Refills: 0 | Status: SHIPPED | OUTPATIENT
Start: 2020-10-28 | End: 2021-01-27

## 2020-10-28 NOTE — PATIENT INSTRUCTIONS
Continue current medications    Consult bariatric medicine  Consult podiatry     Goal blood sugar is  fasting   Goal blood sugar 1 hour after meal is less than 180  Goal blood sugar 2 hour after meal is less than 140

## 2020-10-28 NOTE — LETTER
October 28, 2020    Paul Li Jr.  [address]  [place] [address]             [facility]  [address]  [place] [address]  Phone: [phone]  Fax: [phone] To whom it may concern,     Mr. Paul Li is under my care for diabetic management. Currently, his blood sugar and A1c is controlled on medication without insulin. He does not have high risk of hypoglycemia at this time.       If you have any questions or concerns, please don't hesitate to call.    Sincerely,        Sonal Eric, LAUREEN

## 2020-10-29 ENCOUNTER — PATIENT MESSAGE (OUTPATIENT)
Dept: HEMATOLOGY/ONCOLOGY | Facility: CLINIC | Age: 45
End: 2020-10-29

## 2020-11-03 ENCOUNTER — PATIENT MESSAGE (OUTPATIENT)
Dept: ENDOCRINOLOGY | Facility: CLINIC | Age: 45
End: 2020-11-03

## 2020-11-03 DIAGNOSIS — E11.65 UNCONTROLLED TYPE 2 DIABETES MELLITUS WITH HYPERGLYCEMIA: ICD-10-CM

## 2020-11-04 ENCOUNTER — PATIENT MESSAGE (OUTPATIENT)
Dept: OBSTETRICS AND GYNECOLOGY | Facility: CLINIC | Age: 45
End: 2020-11-04

## 2020-11-04 RX ORDER — METFORMIN HYDROCHLORIDE 500 MG/1
1000 TABLET ORAL 2 TIMES DAILY WITH MEALS
Qty: 90 TABLET | Refills: 3 | Status: SHIPPED | OUTPATIENT
Start: 2020-11-04 | End: 2021-03-17 | Stop reason: SDUPTHER

## 2020-11-16 DIAGNOSIS — E11.9 TYPE 2 DIABETES MELLITUS WITHOUT COMPLICATION, UNSPECIFIED WHETHER LONG TERM INSULIN USE: ICD-10-CM

## 2020-11-17 DIAGNOSIS — G62.0 CHEMOTHERAPY-INDUCED NEUROPATHY: ICD-10-CM

## 2020-11-17 DIAGNOSIS — T45.1X5A CHEMOTHERAPY-INDUCED NEUROPATHY: ICD-10-CM

## 2020-11-17 RX ORDER — GABAPENTIN 300 MG/1
300 CAPSULE ORAL 3 TIMES DAILY
Qty: 90 CAPSULE | Refills: 11 | Status: SHIPPED | OUTPATIENT
Start: 2020-11-17 | End: 2021-12-14 | Stop reason: SDUPTHER

## 2020-11-23 ENCOUNTER — PATIENT MESSAGE (OUTPATIENT)
Dept: FAMILY MEDICINE | Facility: CLINIC | Age: 45
End: 2020-11-23

## 2020-11-24 ENCOUNTER — PATIENT OUTREACH (OUTPATIENT)
Dept: ADMINISTRATIVE | Facility: HOSPITAL | Age: 45
End: 2020-11-24

## 2020-11-24 NOTE — PROGRESS NOTES
Non-compliant [facility] report chart review - Chart review completed for the following HM test if overdue : EYE EXAM    EYE CAM  Bulk order report. Will offer EYE CAM    Care Everywhere and Media reports - updates requested and reviewed.

## 2020-12-01 DIAGNOSIS — Z01.818 PRE-OP EXAM: Primary | ICD-10-CM

## 2020-12-14 ENCOUNTER — OFFICE VISIT (OUTPATIENT)
Dept: HEMATOLOGY/ONCOLOGY | Facility: CLINIC | Age: 45
End: 2020-12-14
Payer: MEDICAID

## 2020-12-14 ENCOUNTER — DOCUMENTATION ONLY (OUTPATIENT)
Dept: HEMATOLOGY/ONCOLOGY | Facility: CLINIC | Age: 45
End: 2020-12-14

## 2020-12-14 ENCOUNTER — PROCEDURE VISIT (OUTPATIENT)
Dept: SURGERY | Facility: CLINIC | Age: 45
End: 2020-12-14
Payer: MEDICAID

## 2020-12-14 VITALS
WEIGHT: 315 LBS | HEART RATE: 71 BPM | OXYGEN SATURATION: 96 % | RESPIRATION RATE: 16 BRPM | SYSTOLIC BLOOD PRESSURE: 151 MMHG | DIASTOLIC BLOOD PRESSURE: 70 MMHG | HEIGHT: 74 IN | BODY MASS INDEX: 40.43 KG/M2 | TEMPERATURE: 98 F

## 2020-12-14 VITALS
WEIGHT: 315 LBS | HEIGHT: 74 IN | BODY MASS INDEX: 40.43 KG/M2 | DIASTOLIC BLOOD PRESSURE: 70 MMHG | HEART RATE: 71 BPM | SYSTOLIC BLOOD PRESSURE: 151 MMHG

## 2020-12-14 DIAGNOSIS — E66.01 MORBID OBESITY WITH BMI OF 50.0-59.9, ADULT: ICD-10-CM

## 2020-12-14 DIAGNOSIS — F43.23 ADJUSTMENT DISORDER WITH MIXED ANXIETY AND DEPRESSED MOOD: ICD-10-CM

## 2020-12-14 DIAGNOSIS — Z17.1 MALIGNANT NEOPLASM INVOLVING BOTH NIPPLE AND AREOLA OF RIGHT BREAST IN MALE, ESTROGEN RECEPTOR NEGATIVE: Primary | ICD-10-CM

## 2020-12-14 DIAGNOSIS — R37 SEXUAL DYSFUNCTION: ICD-10-CM

## 2020-12-14 DIAGNOSIS — G47.30 SLEEP APNEA, UNSPECIFIED TYPE: ICD-10-CM

## 2020-12-14 DIAGNOSIS — Z85.3 PERSONAL HISTORY OF BREAST CANCER: Primary | ICD-10-CM

## 2020-12-14 DIAGNOSIS — C50.021 MALIGNANT NEOPLASM INVOLVING BOTH NIPPLE AND AREOLA OF RIGHT BREAST IN MALE, ESTROGEN RECEPTOR NEGATIVE: Primary | ICD-10-CM

## 2020-12-14 PROCEDURE — 99215 OFFICE O/P EST HI 40 MIN: CPT | Mod: PBBFAC,25 | Performed by: INTERNAL MEDICINE

## 2020-12-14 PROCEDURE — 99999 PR PBB SHADOW E&M-EST. PATIENT-LVL V: CPT | Mod: PBBFAC,,, | Performed by: INTERNAL MEDICINE

## 2020-12-14 PROCEDURE — 36590 PR REMOVAL TUNNELED CV CATH W SUBQ PORT OR PUMP: ICD-10-PCS | Mod: S$PBB,,, | Performed by: SURGERY

## 2020-12-14 PROCEDURE — 99999 PR PBB SHADOW E&M-EST. PATIENT-LVL V: ICD-10-PCS | Mod: PBBFAC,,, | Performed by: INTERNAL MEDICINE

## 2020-12-14 PROCEDURE — 99215 PR OFFICE/OUTPT VISIT, EST, LEVL V, 40-54 MIN: ICD-10-PCS | Mod: S$PBB,,, | Performed by: INTERNAL MEDICINE

## 2020-12-14 PROCEDURE — 36590 REMOVAL TUNNELED CV CATH: CPT | Mod: S$PBB,,, | Performed by: SURGERY

## 2020-12-14 PROCEDURE — 36590 REMOVAL TUNNELED CV CATH: CPT | Mod: PBBFAC | Performed by: SURGERY

## 2020-12-14 PROCEDURE — 99215 OFFICE O/P EST HI 40 MIN: CPT | Mod: S$PBB,,, | Performed by: INTERNAL MEDICINE

## 2020-12-14 NOTE — PROGRESS NOTES
In response to in basket message from Dr. Linn informing that pt needs financial assistance for CPAP, Branden called pt. He is on [employer] and has no ins right now.  He recently started working again and will have ins coverage in 90 about 90 days.  [employer] is closed for the day.  Branden will call tomorrow to get price and apply for necessary financial assistance.

## 2020-12-14 NOTE — PROCEDURES
"Paul Li Jr. is a 45 y.o. male patient.    Pulse: 71 (12/14/20 1012)  BP: (!) 151/70 (12/14/20 1012)  Weight: (!) 178.9 kg (394 lb 6.5 oz) (12/14/20 1012)  Height: 6' 2" (188 cm) (12/14/20 1012)           Port Removal Procedure Note           Physician: Shima Whyte      Assistant:  Beba Keating MD - Resident     Diagnosis: Breast cancer     Procedure:   left port a cath  removal     Date: 12/14/2020     Location: left internal jugular vein     Anesthesia: Local         Procedure:  After informed consent and timeout was performed, the patient was taken to the minor room. Patient was placed supine on the table and arms were tucked by her side. Following this local anesthetic was injected on the anterior left chest. A small incision was made and the port pocket was dissected out. Next, the port was removed from the anterior left pocket. A figure of 8 3-0 vicryl was used to close the catheter opening and the catheter was removed. Wound was irrigated and hemostasis was achieved. The port incision was closed using interuppted 3-0 vicryl followed by running 4-0 monocryl subcuticular. Dressings were applied and all counts were correct at the end of the procedure. Patient tolerated the procedure well.         Complications/Comments:  none     Estimated Blood Loss: 2 cc     Disposition: home     Attestation: I was present for the entire procedure assisted by a qualified resident.    Beba Keating  12/14/2020  "

## 2020-12-14 NOTE — PROGRESS NOTES
Subjective:       Patient ID: Paul Li Jr. is a 45 y.o. male.    Chief Complaint: Follow-up    HPI     Former patient of Dr. Bahena  Reports in the interval he has done well  Weight stable  He would like to lose weight -- states that he is walking about 6000 steps a day at work but notes not able to exercise beyond that with 12 hour days  Watching diet  Denies pain  Notes less energy  Has upcoming hydrocele surgery planned on left 1/17/2021  Reports low sex drive as well    Diagnosis:  Stage IB ([id number]) triple negative invasive ductal carcinoma with lobular features of right breast, retroareolar, Grade 2, Ki67 80%, diagnosed 5/2019        Oncology History   Malignant neoplasm involving both nipple and areola of right breast in male, estrogen receptor negative   5/1/2019 Imaging Significant Findings     Mammogram and US  Impression:  Right  Mass: Right breast 14 mm mass at the retroareolar anterior position. Assessment: 4 - Suspicious finding. Biopsy is recommended.   Left  There is no mammographic or sonographic evidence of malignancy.  Recommendation:  Biopsy is recommended.      5/2/2019 Biopsy     BREAST, RIGHT, RETROAREOLAR MASS, ULTRASOUND-GUIDED BIOPSY:  Invasive ductal carcinoma with lobular features.  Size of invasive carcinoma: 5 MM in greatest linear dimension within a single      5/2/2019 Breast Tumor Markers     Estrogen: Negative  Progesterone: Negative  HER2: Negative  Ki67: 80      5/17/2019 Cancer Staged     Staging form: Breast, AJCC 8th Edition  - Clinical stage from 5/17/2019: Stage IB (cT1c, cN0, cM0, G2, ER-, TX-, HER2-) - Signed by Richa Bahena MD on 5/17/2019 5/27/2019 - 7/21/2019 Chemotherapy     Treatment Summary   Plan Name: OP BREAST DOSE-DENSE AC - DOXORUBICIN CYCLOPHOSPHAMIDE Q2W  Treatment Goal: Curative  Status: Inactive  Start Date: 5/27/2019  End Date: 7/9/2019  Provider: Richa Bahena MD  Chemotherapy: DOXOrubicin chemo injection 186 mg, 60 mg/m2 = 186 mg,  Intravenous, Clinic/HOD 1 time, 4 of 4 cycles  Administration: 186 mg (5/27/2019), 186 mg (6/10/2019), 186 mg (6/24/2019), 186 mg (7/8/2019)  cyclophosphamide (CYTOXAN) 600 mg/m2 = 1,865 mg in sodium chloride 0.9% 250 mL chemo infusion, 600 mg/m2 = 1,865 mg, Intravenous, Clinic/HOD 1 time, 4 of 4 cycles  Administration: 1,865 mg (5/27/2019), 1,865 mg (6/10/2019), 1,865 mg (6/24/2019), 1,865 mg (7/8/2019)      7/22/2019 - 10/15/2019 Chemotherapy     Treatment Summary   Plan Name: OP PACLITAXEL QW  Treatment Goal: Curative  Status: Inactive  Start Date: 7/24/2019  End Date: 10/8/2019  Provider: Richa Bahena MD  Chemotherapy: PACLitaxel (TAXOL) 80 mg/m2 = 246 mg in sodium chloride 0.9% 250 mL chemo infusion, 80 mg/m2 = 246 mg, Intravenous, Clinic/HOD 1 time, 12 of 12 cycles  Dose modification: 60 mg/m2 (original dose 80 mg/m2, Cycle 3), 55 mg/m2 (original dose 80 mg/m2, Cycle 7), 60 mg/m2 (original dose 80 mg/m2, Cycle 7), 50 mg/m2 (original dose 80 mg/m2, Cycle 9), 50 mg/m2 (original dose 80 mg/m2, Cycle 10)  Administration: 246 mg (7/24/2019), 246 mg (7/31/2019), 186 mg (8/7/2019), 186 mg (8/14/2019), 186 mg (8/21/2019), 186 mg (8/28/2019), 186 mg (9/4/2019), 174 mg (9/11/2019), 156 mg (9/18/2019), 156 mg (9/25/2019), 156 mg (10/1/2019), 156 mg (10/8/2019)      11/22/2019 Breast Surgery     On 11/22/19 Dr whyte performed a right breast mastectomy with SLN biopsy with pathology showing grade 2, 6 mm IDC with extensive treatment effect, no LVI with 1 LN positive 4 mm, negative margins. The on 12/17/19, Dr Whyte performed right axillary dissection with pathology still pending.      11/22/2019 Cancer Staged     ypT1b N1      12/17/2019 Breast Surgery     Surgery: Dr Shima Whyte, [phone] performed right ALND with pathology showing 14 negative lymph nodes.             1/14/2020 Tumor Conference      Post mastectomy radiation therapy: Xeloda, then possible Keytruda trial .      2/3/2020 - 3/23/2020  Radiation Therapy     Treating physician: Speedy Nair MD   Total Dose: 50.4 Gy to the chest wall and regional lymphatics  10 Gy boost to the prostate.   Fractions: 28 fractions at 1.8 Gy per fraction  5 fractions at 2 Gy per fraction       2/28/2020 -  Chemotherapy     * 4/15/2020 - 9/28/20 completed 6 cycles adjuvant Xeloda 1000 mg/m2 bid days 1-14 every 21 days  Treatment Summary   Plan Name: OP CAPECITABINE 1250MG/M2 BID Q3W  Treatment Goal: Curative  Status: Active  Start Date: 3/20/2020  End Date: 3/20/2020  Provider: Richa Bahena MD  Chemotherapy: [No matching medication found in this treatment plan]     Review of Systems   Constitutional: Positive for fatigue. Negative for activity change, appetite change, chills, fever and unexpected weight change.   HENT: Negative for dental problem, hearing loss, mouth sores, postnasal drip, rhinorrhea, sinus pressure/congestion, sore throat, tinnitus, trouble swallowing and voice change.    Eyes: Negative for visual disturbance.   Respiratory: Negative for cough, shortness of breath and wheezing.    Cardiovascular: Negative for chest pain, palpitations and leg swelling.   Gastrointestinal: Negative for abdominal distention, abdominal pain, blood in stool, constipation, diarrhea, nausea and vomiting.   Endocrine: Negative for cold intolerance and heat intolerance.   Genitourinary: Negative for decreased urine volume, difficulty urinating, dysuria, frequency, hematuria and urgency.   Musculoskeletal: Negative for arthralgias, back pain, gait problem, joint swelling, myalgias, neck pain and neck stiffness.   Integumentary:  Negative for color change, pallor, rash and wound.   Neurological: Negative for dizziness, weakness, light-headedness, numbness and headaches.   Hematological: Negative for adenopathy. Does not bruise/bleed easily.   Psychiatric/Behavioral: Negative for dysphoric mood and sleep disturbance. The patient is not nervous/anxious.          Objective:       Physical Exam  Vitals signs and nursing note reviewed.   Constitutional:       General: He is not in acute distress.     Appearance: Normal appearance. He is well-developed. He is obese. He is not diaphoretic.   Eyes:      General: No scleral icterus.     Extraocular Movements: Extraocular movements intact.      Conjunctiva/sclera: Conjunctivae normal.      Pupils: Pupils are equal, round, and reactive to light.   Neck:      Musculoskeletal: Normal range of motion and neck supple.   Cardiovascular:      Rate and Rhythm: Normal rate and regular rhythm.      Heart sounds: Normal heart sounds. No murmur.   Pulmonary:      Effort: Pulmonary effort is normal. No respiratory distress.      Breath sounds: Normal breath sounds. No wheezing or rales.      Comments: S/p right mastectomy without palpable masses. Left mediport  Chest:      Chest wall: No tenderness.   Abdominal:      General: Bowel sounds are normal. There is no distension.      Palpations: Abdomen is soft. There is no mass.      Tenderness: There is no abdominal tenderness. There is no guarding.   Musculoskeletal: Normal range of motion.      Comments: Right UE edema with compression stocking   Skin:     General: Skin is warm and dry.      Findings: No rash.   Neurological:      General: No focal deficit present.      Mental Status: He is alert and oriented to person, place, and time.      Motor: No weakness.      Coordination: Coordination normal.      Gait: Gait normal.   Psychiatric:         Mood and Affect: Mood normal.         Behavior: Behavior normal.         Thought Content: Thought content normal.         Judgment: Judgment normal.       Labs- reviewed  Assessment:       1. Malignant neoplasm involving both nipple and areola of right breast in male, estrogen receptor negative    2. Morbid obesity with BMI of 50.0-59.9, adult    3. Adjustment disorder with mixed anxiety and depressed mood    4. Sleep apnea, unspecified type    5. Sexual dysfunction         Plan:     1. ISABEL clinically  Discussed monitoring  RTC 3 months  Negative genetic testing  2. Discussing gastric sleeve he reports  3. Referral back to psychology  4. Discussed seeing if patient assistance can assist  5. Referral to Dr. Weber    35 minutes total including care coordination

## 2020-12-15 ENCOUNTER — DOCUMENTATION ONLY (OUTPATIENT)
Dept: HEMATOLOGY/ONCOLOGY | Facility: CLINIC | Age: 45
End: 2020-12-15

## 2020-12-15 ENCOUNTER — TELEPHONE (OUTPATIENT)
Dept: UROLOGY | Facility: CLINIC | Age: 45
End: 2020-12-15

## 2020-12-15 ENCOUNTER — PATIENT MESSAGE (OUTPATIENT)
Dept: UROLOGY | Facility: CLINIC | Age: 45
End: 2020-12-15

## 2020-12-15 ENCOUNTER — TELEPHONE (OUTPATIENT)
Dept: INFUSION THERAPY | Facility: HOSPITAL | Age: 45
End: 2020-12-15

## 2020-12-15 NOTE — TELEPHONE ENCOUNTER
----- Message from José Manuel Weber MD sent at 12/15/2020  7:20 AM CST -----  Absolutely.  Thanks for the referral.    Angelique, can you have him see me for ED?  And Dr. Hernandez for the hydrocele?    José Manuel  ----- Message -----  From: Rosa Linn MD  Sent: 12/14/2020   4:30 PM CST  To: José Manuel Weber MD    Can you assist with seeing him?  1. He wants a new urologist already for a hydrocele   2. He has sexual dysfunction post chemo and may need testosterone  Thanks  Rosa

## 2020-12-15 NOTE — PROGRESS NOTES
Branden spoke with Hola Vanegas [phone] [facility] regarding pt's CPAP machine.  He will drop off a folder with necessary information on 12/17/20.  Branden will follow.  Pt informed.

## 2020-12-16 ENCOUNTER — PATIENT OUTREACH (OUTPATIENT)
Dept: ADMINISTRATIVE | Facility: OTHER | Age: 45
End: 2020-12-16

## 2020-12-16 NOTE — PROGRESS NOTES
LINKS immunization registry not responding  Care Everywhere updated  Health Maintenance updated  Chart reviewed for overdue Proactive [facility] ([facility]) health maintenance testing (CRS, Breast Ca, Diabetic Eye Exam)   Orders entered:N/A

## 2020-12-17 ENCOUNTER — TELEPHONE (OUTPATIENT)
Dept: BARIATRICS | Facility: CLINIC | Age: 45
End: 2020-12-17

## 2020-12-18 ENCOUNTER — PATIENT MESSAGE (OUTPATIENT)
Dept: ADMINISTRATIVE | Facility: OTHER | Age: 45
End: 2020-12-18

## 2020-12-18 ENCOUNTER — PATIENT MESSAGE (OUTPATIENT)
Dept: HEMATOLOGY/ONCOLOGY | Facility: CLINIC | Age: 45
End: 2020-12-18

## 2020-12-18 ENCOUNTER — HOSPITAL ENCOUNTER (EMERGENCY)
Facility: HOSPITAL | Age: 45
Discharge: HOME | End: 2020-12-18
Attending: EMERGENCY MEDICINE
Payer: MEDICAID

## 2020-12-18 ENCOUNTER — PATIENT MESSAGE (OUTPATIENT)
Dept: FAMILY MEDICINE | Facility: CLINIC | Age: 45
End: 2020-12-18

## 2020-12-18 ENCOUNTER — PATIENT MESSAGE (OUTPATIENT)
Dept: SURGERY | Facility: CLINIC | Age: 45
End: 2020-12-18

## 2020-12-18 VITALS
HEIGHT: 74 IN | SYSTOLIC BLOOD PRESSURE: 140 MMHG | HEART RATE: 74 BPM | DIASTOLIC BLOOD PRESSURE: 85 MMHG | BODY MASS INDEX: 40.43 KG/M2 | TEMPERATURE: 98 F | RESPIRATION RATE: 18 BRPM | OXYGEN SATURATION: 96 % | WEIGHT: 315 LBS

## 2020-12-18 DIAGNOSIS — R53.1 WEAKNESS: ICD-10-CM

## 2020-12-18 DIAGNOSIS — U07.1 COVID-19: Primary | ICD-10-CM

## 2020-12-18 DIAGNOSIS — J18.9 PNEUMONIA OF BOTH LOWER LOBES DUE TO INFECTIOUS ORGANISM: ICD-10-CM

## 2020-12-18 DIAGNOSIS — R40.20 LOSS OF CONSCIOUSNESS: ICD-10-CM

## 2020-12-18 LAB
BASOPHILS # BLD AUTO: 0 K/UL (ref 0–0.2)
BASOPHILS NFR BLD: 0 % (ref 0–1.9)
CTP QC/QA: YES
DIFFERENTIAL METHOD: ABNORMAL
EOSINOPHIL # BLD AUTO: 0 K/UL (ref 0–0.5)
EOSINOPHIL NFR BLD: 0 % (ref 0–8)
ERYTHROCYTE [DISTWIDTH] IN BLOOD BY AUTOMATED COUNT: 13.7 % (ref 11.5–14.5)
HCT VFR BLD AUTO: 38.7 % (ref 40–54)
HGB BLD-MCNC: 12 G/DL (ref 14–18)
IMM GRANULOCYTES # BLD AUTO: 0 K/UL (ref 0–0.04)
IMM GRANULOCYTES NFR BLD AUTO: 0 % (ref 0–0.5)
LYMPHOCYTES # BLD AUTO: 1.1 K/UL (ref 1–4.8)
LYMPHOCYTES NFR BLD: 32.3 % (ref 18–48)
MCH RBC QN AUTO: 26.1 PG (ref 27–31)
MCHC RBC AUTO-ENTMCNC: 31 G/DL (ref 32–36)
MCV RBC AUTO: 84 FL (ref 82–98)
MONOCYTES # BLD AUTO: 0.4 K/UL (ref 0.3–1)
MONOCYTES NFR BLD: 10.8 % (ref 4–15)
NEUTROPHILS # BLD AUTO: 1.9 K/UL (ref 1.8–7.7)
NEUTROPHILS NFR BLD: 56.9 % (ref 38–73)
NRBC BLD-RTO: 0 /100 WBC
PLATELET # BLD AUTO: 142 K/UL (ref 150–350)
PMV BLD AUTO: 11.1 FL (ref 9.2–12.9)
RBC # BLD AUTO: 4.6 M/UL (ref 4.6–6.2)
SARS-COV-2 RDRP RESP QL NAA+PROBE: POSITIVE
WBC # BLD AUTO: 3.25 K/UL (ref 3.9–12.7)

## 2020-12-18 PROCEDURE — U0002 COVID-19 LAB TEST NON-CDC: HCPCS | Performed by: EMERGENCY MEDICINE

## 2020-12-18 PROCEDURE — 99285 EMERGENCY DEPT VISIT HI MDM: CPT | Mod: 25

## 2020-12-18 PROCEDURE — 93010 ELECTROCARDIOGRAM REPORT: CPT | Mod: ,,, | Performed by: INTERNAL MEDICINE

## 2020-12-18 PROCEDURE — 93010 EKG 12-LEAD: ICD-10-PCS | Mod: ,,, | Performed by: INTERNAL MEDICINE

## 2020-12-18 PROCEDURE — 85025 COMPLETE CBC W/AUTO DIFF WBC: CPT

## 2020-12-18 PROCEDURE — 93005 ELECTROCARDIOGRAM TRACING: CPT

## 2020-12-18 PROCEDURE — 99284 PR EMERGENCY DEPT VISIT,LEVEL IV: ICD-10-PCS | Mod: ,,, | Performed by: NURSE PRACTITIONER

## 2020-12-18 PROCEDURE — 99284 EMERGENCY DEPT VISIT MOD MDM: CPT | Mod: ,,, | Performed by: NURSE PRACTITIONER

## 2020-12-18 RX ORDER — ALBUTEROL SULFATE 90 UG/1
1-2 AEROSOL, METERED RESPIRATORY (INHALATION) EVERY 6 HOURS PRN
Qty: 6.7 G | Refills: 0 | Status: ON HOLD | OUTPATIENT
Start: 2020-12-18 | End: 2020-12-24 | Stop reason: SDUPTHER

## 2020-12-18 RX ORDER — CEFTRIAXONE 1 G/1
1 INJECTION, POWDER, FOR SOLUTION INTRAMUSCULAR; INTRAVENOUS
Status: DISCONTINUED | OUTPATIENT
Start: 2020-12-18 | End: 2020-12-18 | Stop reason: HOSPADM

## 2020-12-18 RX ORDER — AZITHROMYCIN 250 MG/1
250 TABLET, FILM COATED ORAL DAILY
Qty: 6 TABLET | Refills: 0 | Status: SHIPPED | OUTPATIENT
Start: 2020-12-18 | End: 2021-02-01

## 2020-12-18 RX ORDER — BENZONATATE 100 MG/1
100 CAPSULE ORAL 3 TIMES DAILY PRN
Qty: 20 CAPSULE | Refills: 0 | Status: SHIPPED | OUTPATIENT
Start: 2020-12-18 | End: 2020-12-28

## 2020-12-18 NOTE — TELEPHONE ENCOUNTER
----- Message from Real Brown sent at 12/15/2020 12:03 PM CST -----  Consult scheduled for 12/18/20, no insurance. Pt request appointment cancellation.

## 2020-12-18 NOTE — DISCHARGE INSTRUCTIONS
I recommend good hand hygiene, social distancing (ideally staying at least 6 feet away from people), not attending public events until health authorities  otherwise. Because your status is unknown, though you're healthy enough to return home, we highly recommend 14 day isolation period. This means staying at home and significantly limiting outside contact. Wear a mask around others.     For fever, cough, or other viral respiratory symptoms, I recommend supportive care measures.  This includes staying well hydrated, getting plenty of sleep, taking Tylenol or ibuprofen for fevers or pain.    So far from what we know about coronavirus, the people who get very sick tend to do so around day 7-8 of the illness.  Any severe shortness of breath, fever you cannot control, or other symptoms that you believe constitute an emergency, please return to the hospital.    Our goal in the emergency department is to always give you outstanding care and exceptional service. You may receive a survey by mail or e-mail in the next week regarding your experience in our ED. We would greatly appreciate your completing and returning the survey. Your feedback provides us with a way to recognize our staff who give very good care and it helps us learn how to improve when your experience was below our aspiration of excellence.     Someone should be calling you on Monday morning to set up the outpatient infusion for the COVID antibodies.  May return to emerge department any time if you have any concerns, worsening shortness of breath, chest pain or any uncontrollable fever.

## 2020-12-18 NOTE — ED PROVIDER NOTES
Encounter Date: 12/18/2020       History     Chief Complaint   Patient presents with    Loss of Consciousness     This is the urgent evaluation 45-year-old black male with a past medical history of breast cancer, diabetes, hypertension who presents emergency department today complaining of weakness x 2 days, worse since last night.  He reports no sick contacts at home.  He denies any cough/congestion, shortness of breath, chest pain or fever/chills.  Reports earlier today while sitting in his truck states that he feels like he blacked out for a few seconds.  Was sent over for evaluation by his boss.  Patient finished chemo and Xeloda about 3 months ago.        Review of patient's allergies indicates:  No Known Allergies  Past Medical History:   Diagnosis Date    Arthritis     Breast cancer     Cancer     R breast    Diabetes mellitus     HTN (hypertension)     Leg edema, right     Prediabetes     Seizures     at age 18 - stress related    Therapy     marital counseling     Past Surgical History:   Procedure Laterality Date    AXILLARY NODE DISSECTION Right 11/22/2019    Procedure: LYMPHADENECTOMY, AXILLARY RIGHT;  Surgeon: Shima Whyte MD;  [facility]: [facility];  Service: General;  Laterality: Right;    AXILLARY NODE DISSECTION Right 12/17/2019    Procedure: LYMPHADENECTOMY, AXILLARY;  Surgeon: Shima Whyte MD;  Location: [facility] OR [address] [place] [address];  Service: General;  Laterality: Right;    BREAST BIOPSY      INSERTION OF TUNNELED CENTRAL VENOUS CATHETER (CVC) WITH SUBCUTANEOUS PORT Left 5/24/2019    Procedure: [id number];  Surgeon: Shima Whyte MD;  Location: [facility] OR [address] [place] [address];  Service: General;  Laterality: Left;    SENTINEL LYMPH NODE BIOPSY Right 11/22/2019    Procedure: BIOPSY, LYMPH NODE, SENTINEL RIGHT;  Surgeon: Shima Whyte MD;  Location: [facility];  Service: General;  Laterality: Right;    SIMPLE MASTECTOMY Right 11/22/2019    Procedure: MASTECTOMY, SIMPLE RIGHT (CONSENT AM OF) 2.5 hr case;   Surgeon: Shima Whyte MD;  Location: [facility] OR;  Service: General;  Laterality: Right;     Family History   Problem Relation Age of Onset    Hypertension Mother     Diabetes Mother     Hypertension Father     Lupus Father     Post-traumatic stress disorder Brother     No Known Problems Maternal Grandmother     Colon cancer Maternal Grandfather     Breast cancer Paternal Grandmother     No Known Problems Paternal Grandfather     No Known Problems Sister     No Known Problems Maternal Aunt     No Known Problems Maternal Uncle     Fibromyalgia Paternal Aunt     Lupus Paternal Aunt     No Known Problems Paternal Uncle     No Known Problems Brother     No Known Problems Sister     No Known Problems Son     No Known Problems Son     No Known Problems Son     No Known Problems Son      Social History     Tobacco Use    Smoking status: Former Smoker     Packs/day: 0.25     Years: 15.00     Pack years: 3.75     Types: Cigarettes     Quit date: 2014     Years since quittin.0    Smokeless tobacco: Never Used    Tobacco comment: Social smoker with alcohol    Substance Use Topics    Alcohol use: Yes     Alcohol/week: 0.0 standard drinks     Frequency: Monthly or less     Drinks per session: 1 or 2     Binge frequency: Never     Comment: Rarely    Drug use: Never     Review of Systems   Constitutional: Positive for fatigue. Negative for chills and fever.   Respiratory: Negative for cough and shortness of breath.    Cardiovascular: Negative for chest pain.   Gastrointestinal: Negative for abdominal pain.   Musculoskeletal: Negative for back pain, gait problem and neck pain.   Neurological: Positive for weakness. Negative for headaches.   All other systems reviewed and are negative.      Physical Exam     Initial Vitals [20 1152]   BP Pulse Resp Temp SpO2   (!) 144/76 88 16 98.9 °F (37.2 °C) 95 %      MAP       --         Vitals:    20 1152 20 1507   BP: (!) 144/76 (!) 140/85  "  Pulse: 88 74   Resp: 16 18   Temp: 98.9 °F (37.2 °C) 98.4 °F (36.9 °C)   TempSrc: Oral Oral   SpO2: 95% 96%   Weight: (!) 178.7 kg (394 lb)    Height: 6' 2" (1.88 m)        Physical Exam    Nursing note and vitals reviewed.  Constitutional: Vital signs are normal. He appears well-developed and well-nourished. He does not appear ill. No distress.   HENT:   Head: Normocephalic and atraumatic.   Mouth/Throat: Oropharynx is clear and moist and mucous membranes are normal. Mucous membranes are not pale and not dry.   Eyes: Conjunctivae and EOM are normal.   Neck: Full passive range of motion without pain. Neck supple.   Cardiovascular: Normal rate, regular rhythm, S1 normal, S2 normal and normal heart sounds. Exam reveals no gallop.    No murmur heard.  Pulses:       Radial pulses are 2+ on the right side and 2+ on the left side.   Pulmonary/Chest: Effort normal. No tachypnea. He has decreased breath sounds. He has no wheezes.   Neurological: He is alert and oriented to person, place, and time. GCS eye subscore is 4. GCS verbal subscore is 5. GCS motor subscore is 6.   Skin: Skin is warm, dry and intact.         ED Course   Procedures  Labs Reviewed   CBC W/ AUTO DIFFERENTIAL - Abnormal; Notable for the following components:       Result Value    WBC 3.25 (*)     Hemoglobin 12.0 (*)     Hematocrit 38.7 (*)     MCH 26.1 (*)     MCHC 31.0 (*)     Platelets 142 (*)     All other components within normal limits   SARS-COV-2 RDRP GENE - Abnormal; Notable for the following components:    POC Rapid COVID Positive (*)     All other components within normal limits    Narrative:     .rapidabbott   COMPREHENSIVE METABOLIC PANEL        ECG Results          EKG 12-lead (Final result)  Result time 12/18/20 15:10:41    Final result by Interface, Lab In [facility] (12/18/20 15:10:41)                 Narrative:    Test Reason : R40.20,    Vent. Rate : 080 BPM     Atrial Rate : 080 BPM     P-R Int : 152 ms          QRS Dur : 082 ms      QT " Int : 336 ms       P-R-T Axes : 011 023 013 degrees     QTc Int : 387 ms    Normal sinus rhythm  Nonspecific ST and T wave abnormality  Otherwise normal ECG  When compared with ECG of 14-NOV-2019 12:01,  No significant change was found  Confirmed by SHASHI ROOT MD ([id number]) on 12/18/2020 3:10:25 PM    Referred By: [email or web address]   SELF           Confirmed By:SHASHI ROOT MD                            Imaging Results           X-Ray Chest 1 View (Final result)  Result time 12/18/20 16:16:07    Final result by Marco Clemente MD (12/18/20 16:16:07)                 Impression:      Mild bilateral peripheral patchy alveolar infiltrates could be associated with viral pneumonia.  Follow-up recommended.    This report was flagged in Epic as abnormal.      Electronically signed by: Marco Clemente  Date:    12/18/2020  Time:    16:16             Narrative:    EXAMINATION:  XR CHEST 1 VIEW    CLINICAL HISTORY:  COVID-19    TECHNIQUE:  Single frontal view of the chest was performed.    COMPARISON:  11/14/2019    FINDINGS:  Mild peripheral patchy alveolar infiltrates bilaterally could be associated with viral pneumonia.  Follow-up recommended.    No consolidation is detected.  No effusion or pneumothorax.    Heart is normal size.  No edema is identified.    No acute osseous abnormality.                                 Medical Decision Making:   Differential Diagnosis:   Differential Diagnosis includes, but is not limited to:  meningitis, nasal foreign body, otitis media/external, bacterial sinusitis, allergic rhinitis, influenza, bacterial/viral pharyngitis, bacterial/viral pneumonia, covid-19    Clinical Tests:   Lab Tests: Reviewed and Ordered  Radiological Study: Ordered and Reviewed  ED Management:  44 yo covid positive patient who presented with 3 day course of increasing weakness and possible syncope earlier today.  Patient was an extremely difficult stick and access was not obtained after multiple attempts by multiple  nurses.  CBC was obtained which showed low white blood cell count which is in line with his previous 2 months ago.  Patient's platelets are also mildly decreased at 142, he is not actively bleeding anywhere.  Patient is drinking water without difficulty emergency department.  Chest x-ray did reveal a possible viral pneumonia.  Will discharge the patient home with antibiotics due to his previous history and recent chemo.  I did place an outpatient referral for the COVID antibody drug bamlanivimab.  Some will call him Monday to get him set up for outpatient infusion.  I did discuss if he gets worse over the weekend including chest pain, shortness of breath or uncontrolled febrile to please return to emergency department any time.  Patient stated understanding.  He also ambulated around the emergency department twice without any shortness of breath or LEW maintain oxygen saturation of 95%.                   ED Course as of Dec 18 1743   Fri Dec 18, 2020   1246 Pt ambulation o2 sat 96% without sob/lew    [AS]   1600 Platelets(!): 142 [AS]   1602 WBC(!): 3.25 [AS]      ED Course User Index  [AS] OZIEL Bishop            Clinical Impression:       ICD-10-CM ICD-9-CM   1. COVID-19  U07.1 079.89   2. Loss of consciousness  R40.20 780.09   3. Weakness  R53.1 780.79   4. Pneumonia of both lower lobes due to infectious organism  J18.9 486                      Disposition:   Disposition: Discharged  Condition: Stable     ED Disposition Condition    Discharge Stable        ED Prescriptions     Medication Sig Dispense Start Date End Date Auth. Provider    pulse oximeter (PULSE OXIMETER) device by Apply Externally route 2 (two) times a day. Use twice daily at 8 AM and 3 PM and record the value in [employer] as directed. 1 each 12/18/2020  OZIEL Bishop    benzonatate (TESSALON) 100 MG capsule Take 1 capsule (100 mg total) by mouth 3 (three) times daily as needed for Cough. 20 capsule 12/18/2020 12/28/2020 Xiomara Rubio  OZIEL    azithromycin (Z-DAWOOD) 250 MG tablet Take first 2 tablets by mouth together on day 1, then 1 every day thereafter until finished. 6 tablet 12/18/2020  OZIEL Bishop    albuterol (PROVENTIL/VENTOLIN HFA) 90 mcg/actuation inhaler Inhale 1-2 puffs into the lungs every 6 (six) hours as needed for Wheezing. Rescue 6.7 g 12/18/2020 12/18/2021 OZIEL Bishop        Follow-up Information     Follow up With Specialties Details Why Contact Info    Kareem Arroyo MD Family Medicine   [address] [place] [address]  [phone]      [facility] Emergency Medicine Go in 1 day If symptoms worsen [facility] [facility] [address] [phone]                                       OZIEL Bishop  12/18/20 [id number]

## 2020-12-19 ENCOUNTER — PATIENT MESSAGE (OUTPATIENT)
Dept: ADMINISTRATIVE | Facility: OTHER | Age: 45
End: 2020-12-19

## 2020-12-20 ENCOUNTER — NURSE TRIAGE (OUTPATIENT)
Dept: ADMINISTRATIVE | Facility: CLINIC | Age: 45
End: 2020-12-20

## 2020-12-20 ENCOUNTER — PATIENT MESSAGE (OUTPATIENT)
Dept: ADMINISTRATIVE | Facility: OTHER | Age: 45
End: 2020-12-20

## 2020-12-20 NOTE — TELEPHONE ENCOUNTER
"Patient initially escalated due to SpO2 94%, however patient took new vitals during phone call after taking a few deep breaths, and coughing; new vital signs and symptoms (see below); patient states that he answered 'Yes" to fever and worsening symptoms because of his current temp and slight SOB; did not trigger a second escalation; advised per CV-19 Home care protocol advice; Advised to call back if further assistance is needed; with chest pains, 93% SpO2 or lower, or difficulty breathing to call 911 or go to the nearest ED, wear a mask and call ahead to alert them of COVID-19 status Patient verbalizes understanding and agrees to follow care advice given.      Sp02: 96  Pulse: 85  Temperature: 100  SOB at rest (0-5): 1  SOB with activity (0-5): 1  Worsening symptoms: no  Fever symptoms: no  Increased home oxygen: n/a    Reason for Disposition   Health Information question, no triage required and triager able to answer question    Protocols used: INFORMATION ONLY CALL - NO TRIAGE-A-      "

## 2020-12-21 ENCOUNTER — NURSE TRIAGE (OUTPATIENT)
Dept: ADMINISTRATIVE | Facility: CLINIC | Age: 45
End: 2020-12-21

## 2020-12-21 ENCOUNTER — INFUSION (OUTPATIENT)
Dept: INFECTIOUS DISEASES | Facility: HOSPITAL | Age: 45
DRG: 177 | End: 2020-12-21
Attending: INTERNAL MEDICINE
Payer: MEDICAID

## 2020-12-21 ENCOUNTER — PATIENT MESSAGE (OUTPATIENT)
Dept: ADMINISTRATIVE | Facility: OTHER | Age: 45
End: 2020-12-21

## 2020-12-21 ENCOUNTER — TELEPHONE (OUTPATIENT)
Dept: ADMINISTRATIVE | Facility: CLINIC | Age: 45
End: 2020-12-21

## 2020-12-21 ENCOUNTER — OFFICE VISIT (OUTPATIENT)
Dept: PSYCHIATRY | Facility: CLINIC | Age: 45
DRG: 177 | End: 2020-12-21
Payer: MEDICAID

## 2020-12-21 ENCOUNTER — DOCUMENTATION ONLY (OUTPATIENT)
Dept: HEMATOLOGY/ONCOLOGY | Facility: CLINIC | Age: 45
End: 2020-12-21

## 2020-12-21 ENCOUNTER — HOSPITAL ENCOUNTER (INPATIENT)
Facility: HOSPITAL | Age: 45
LOS: 3 days | Discharge: HOME | DRG: 177 | End: 2020-12-24
Attending: EMERGENCY MEDICINE | Admitting: STUDENT IN AN ORGANIZED HEALTH CARE EDUCATION/TRAINING PROGRAM
Payer: MEDICAID

## 2020-12-21 VITALS
WEIGHT: 315 LBS | RESPIRATION RATE: 20 BRPM | TEMPERATURE: 100 F | OXYGEN SATURATION: 96 % | DIASTOLIC BLOOD PRESSURE: 66 MMHG | HEIGHT: 74 IN | HEART RATE: 86 BPM | BODY MASS INDEX: 40.43 KG/M2 | SYSTOLIC BLOOD PRESSURE: 116 MMHG

## 2020-12-21 DIAGNOSIS — I10 ESSENTIAL HYPERTENSION: ICD-10-CM

## 2020-12-21 DIAGNOSIS — E11.9 TYPE 2 DIABETES MELLITUS WITHOUT COMPLICATION, WITHOUT LONG-TERM CURRENT USE OF INSULIN: ICD-10-CM

## 2020-12-21 DIAGNOSIS — U07.1 PNEUMONIA DUE TO COVID-19 VIRUS: Primary | ICD-10-CM

## 2020-12-21 DIAGNOSIS — F43.23 ADJUSTMENT DISORDER WITH MIXED ANXIETY AND DEPRESSED MOOD: Primary | ICD-10-CM

## 2020-12-21 DIAGNOSIS — U07.1 COVID-19: ICD-10-CM

## 2020-12-21 DIAGNOSIS — J12.82 PNEUMONIA DUE TO COVID-19 VIRUS: Primary | ICD-10-CM

## 2020-12-21 DIAGNOSIS — E66.01 MORBID OBESITY WITH BMI OF 50.0-59.9, ADULT: ICD-10-CM

## 2020-12-21 DIAGNOSIS — C50.021 MALIGNANT NEOPLASM INVOLVING BOTH NIPPLE AND AREOLA OF RIGHT BREAST IN MALE, ESTROGEN RECEPTOR NEGATIVE: ICD-10-CM

## 2020-12-21 DIAGNOSIS — Z17.1 MALIGNANT NEOPLASM INVOLVING BOTH NIPPLE AND AREOLA OF RIGHT BREAST IN MALE, ESTROGEN RECEPTOR NEGATIVE: ICD-10-CM

## 2020-12-21 DIAGNOSIS — Z20.822 SUSPECTED COVID-19 VIRUS INFECTION: ICD-10-CM

## 2020-12-21 LAB
BASOPHILS # BLD AUTO: 0.01 K/UL (ref 0–0.2)
BASOPHILS NFR BLD: 0.2 % (ref 0–1.9)
DIFFERENTIAL METHOD: ABNORMAL
EOSINOPHIL # BLD AUTO: 0 K/UL (ref 0–0.5)
EOSINOPHIL NFR BLD: 0 % (ref 0–8)
ERYTHROCYTE [DISTWIDTH] IN BLOOD BY AUTOMATED COUNT: 13.9 % (ref 11.5–14.5)
HCT VFR BLD AUTO: 36 % (ref 40–54)
HGB BLD-MCNC: 11.2 G/DL (ref 14–18)
IMM GRANULOCYTES # BLD AUTO: 0.03 K/UL (ref 0–0.04)
IMM GRANULOCYTES NFR BLD AUTO: 0.7 % (ref 0–0.5)
LACTATE SERPL-SCNC: 0.6 MMOL/L (ref 0.5–2.2)
LYMPHOCYTES # BLD AUTO: 1 K/UL (ref 1–4.8)
LYMPHOCYTES NFR BLD: 20.6 % (ref 18–48)
MCH RBC QN AUTO: 25.9 PG (ref 27–31)
MCHC RBC AUTO-ENTMCNC: 31.1 G/DL (ref 32–36)
MCV RBC AUTO: 83 FL (ref 82–98)
MONOCYTES # BLD AUTO: 0.3 K/UL (ref 0.3–1)
MONOCYTES NFR BLD: 6.1 % (ref 4–15)
NEUTROPHILS # BLD AUTO: 3.3 K/UL (ref 1.8–7.7)
NEUTROPHILS NFR BLD: 72.4 % (ref 38–73)
NRBC BLD-RTO: 0 /100 WBC
PLATELET # BLD AUTO: 181 K/UL (ref 150–350)
PMV BLD AUTO: 12.3 FL (ref 9.2–12.9)
RBC # BLD AUTO: 4.33 M/UL (ref 4.6–6.2)
WBC # BLD AUTO: 4.61 K/UL (ref 3.9–12.7)

## 2020-12-21 PROCEDURE — 12000002 HC ACUTE/MED SURGE SEMI-PRIVATE ROOM

## 2020-12-21 PROCEDURE — 63700000 PHARM REV CODE 250 ALT 637 W/O HCPCS: Performed by: EMERGENCY MEDICINE

## 2020-12-21 PROCEDURE — 90832 PSYTX W PT 30 MINUTES: CPT | Mod: 95,,, | Performed by: PSYCHOLOGIST

## 2020-12-21 PROCEDURE — 96375 TX/PRO/DX INJ NEW DRUG ADDON: CPT

## 2020-12-21 PROCEDURE — 83605 ASSAY OF LACTIC ACID: CPT

## 2020-12-21 PROCEDURE — 84484 ASSAY OF TROPONIN QUANT: CPT

## 2020-12-21 PROCEDURE — 82728 ASSAY OF FERRITIN: CPT

## 2020-12-21 PROCEDURE — 96374 THER/PROPH/DIAG INJ IV PUSH: CPT

## 2020-12-21 PROCEDURE — 99285 PR EMERGENCY DEPT VISIT,LEVEL V: ICD-10-PCS | Mod: ,,, | Performed by: EMERGENCY MEDICINE

## 2020-12-21 PROCEDURE — 93010 EKG 12-LEAD: ICD-10-PCS | Mod: ,,, | Performed by: INTERNAL MEDICINE

## 2020-12-21 PROCEDURE — M0239 BAMLANIVIMAB-XXXX INFUSION: HCPCS | Performed by: INTERNAL MEDICINE

## 2020-12-21 PROCEDURE — 25000003 PHARM REV CODE 250

## 2020-12-21 PROCEDURE — 85025 COMPLETE CBC W/AUTO DIFF WBC: CPT

## 2020-12-21 PROCEDURE — 99285 EMERGENCY DEPT VISIT HI MDM: CPT | Mod: 25

## 2020-12-21 PROCEDURE — 99285 EMERGENCY DEPT VISIT HI MDM: CPT | Mod: ,,, | Performed by: EMERGENCY MEDICINE

## 2020-12-21 PROCEDURE — 93010 ELECTROCARDIOGRAM REPORT: CPT | Mod: ,,, | Performed by: INTERNAL MEDICINE

## 2020-12-21 PROCEDURE — 25000003 PHARM REV CODE 250: Performed by: INTERNAL MEDICINE

## 2020-12-21 PROCEDURE — 90832 PR PSYCHOTHERAPY W/PATIENT, 30 MIN: ICD-10-PCS | Mod: 95,,, | Performed by: PSYCHOLOGIST

## 2020-12-21 PROCEDURE — 63600175 PHARM REV CODE 636 W HCPCS: Performed by: EMERGENCY MEDICINE

## 2020-12-21 PROCEDURE — 93005 ELECTROCARDIOGRAM TRACING: CPT

## 2020-12-21 PROCEDURE — 63600175 PHARM REV CODE 636 W HCPCS: Performed by: INTERNAL MEDICINE

## 2020-12-21 RX ORDER — CEFTRIAXONE 1 G/1
1 INJECTION, POWDER, FOR SOLUTION INTRAMUSCULAR; INTRAVENOUS
Status: COMPLETED | OUTPATIENT
Start: 2020-12-21 | End: 2020-12-21

## 2020-12-21 RX ORDER — DEXAMETHASONE SODIUM PHOSPHATE 4 MG/ML
6 INJECTION, SOLUTION INTRA-ARTICULAR; INTRALESIONAL; INTRAMUSCULAR; INTRAVENOUS; SOFT TISSUE
Status: COMPLETED | OUTPATIENT
Start: 2020-12-21 | End: 2020-12-21

## 2020-12-21 RX ORDER — DIPHENHYDRAMINE HYDROCHLORIDE 50 MG/ML
25 INJECTION INTRAMUSCULAR; INTRAVENOUS ONCE AS NEEDED
Status: DISCONTINUED | OUTPATIENT
Start: 2020-12-21 | End: 2020-12-24 | Stop reason: HOSPADM

## 2020-12-21 RX ORDER — ACETAMINOPHEN 325 MG/1
650 TABLET ORAL ONCE AS NEEDED
Status: DISCONTINUED | OUTPATIENT
Start: 2020-12-21 | End: 2020-12-21 | Stop reason: HOSPADM

## 2020-12-21 RX ORDER — AZITHROMYCIN 250 MG/1
500 TABLET, FILM COATED ORAL
Status: COMPLETED | OUTPATIENT
Start: 2020-12-21 | End: 2020-12-21

## 2020-12-21 RX ORDER — ACETAMINOPHEN 325 MG/1
TABLET ORAL
Status: COMPLETED
Start: 2020-12-21 | End: 2020-12-21

## 2020-12-21 RX ORDER — SODIUM CHLORIDE 0.9 % (FLUSH) 0.9 %
10 SYRINGE (ML) INJECTION
Status: DISCONTINUED | OUTPATIENT
Start: 2020-12-21 | End: 2020-12-24 | Stop reason: HOSPADM

## 2020-12-21 RX ORDER — ALBUTEROL SULFATE 90 UG/1
2 AEROSOL, METERED RESPIRATORY (INHALATION)
Status: DISCONTINUED | OUTPATIENT
Start: 2020-12-21 | End: 2020-12-24 | Stop reason: HOSPADM

## 2020-12-21 RX ORDER — ONDANSETRON 4 MG/1
4 TABLET, ORALLY DISINTEGRATING ORAL ONCE AS NEEDED
Status: DISCONTINUED | OUTPATIENT
Start: 2020-12-21 | End: 2020-12-24 | Stop reason: HOSPADM

## 2020-12-21 RX ORDER — EPINEPHRINE 0.1 MG/ML
0.3 INJECTION INTRAVENOUS
Status: DISCONTINUED | OUTPATIENT
Start: 2020-12-21 | End: 2020-12-24 | Stop reason: HOSPADM

## 2020-12-21 RX ADMIN — DEXAMETHASONE SODIUM PHOSPHATE 6 MG: 4 INJECTION INTRA-ARTICULAR; INTRALESIONAL; INTRAMUSCULAR; INTRAVENOUS; SOFT TISSUE at 11:12

## 2020-12-21 RX ADMIN — ACETAMINOPHEN 650 MG: 325 TABLET ORAL at 08:12

## 2020-12-21 RX ADMIN — CEFTRIAXONE SODIUM 1 G: 1 INJECTION, POWDER, FOR SOLUTION INTRAMUSCULAR; INTRAVENOUS at 11:12

## 2020-12-21 RX ADMIN — SODIUM CHLORIDE 700 MG: 0.9 INJECTION, SOLUTION INTRAVENOUS at 08:12

## 2020-12-21 RX ADMIN — AZITHROMYCIN MONOHYDRATE 500 MG: 250 TABLET ORAL at 11:12

## 2020-12-21 NOTE — PROGRESS NOTES
Telemedicine PSYCHO-ONCOLOGY NOTE/ Individual Psychotherapy   (patient not on premises due to COVID-19 restrictions)    Consultation started: 12/21/2020 at 4:02 PM   The chief complaint leading to consultation is: adaptation to disease and treatment  The patient location is:  Patient home in [place]  Virtual visit with synchronous audio and video   Patient alone at the time of consultation      Each patient provided medical services by telemedicine is:  (1) informed of the relationship between the physician and patient and the respective role of any other health care provider with respect to management of the patient; and (2) notified that he or she may decline to receive medical services by telemedicine and may withdraw from such care at any time.    Crisis Disclaimer: Patient was informed that due to the virtual nature of the visit, that if a crisis develops, protocols will be implemented to ensure patient safety, including but not limited to: 1) Initiating a welfare check with local Law Enforcement, 2) Calling 1/National Crisis Hotline, and/or 3) Initiating PEC/CEC procedures.     Date: 12/21/2020   Site of therapist:  [facility]         Therapeutic Intervention: Met with patient.  Outpatient - Behavior modifying psychotherapy 30 min - CPT code 10290    Referring provider:    Chief complaint/reason for encounter: Met with patient to evaluate psychosocial adaptation to survivorship of breast cancer    Patient was last seen by me on 8/7/2019    Objective:      Paul Li  arrived promptly for the session (via video).  Mr. Li was independently ambulatory at the time of session. The patient was fully cooperative throughout the session.  Appearance: age appropriate, casually  dressed, well groomed  Behavior/Cooperation: friendly and cooperative  Speech: normal in rate, volume, and tone and appropriate quality, quantity and organization of sentences  Mood: dysthymic  Affect: mood congruent, full  range and appropriate  Thought Process: goal-directed, logical  Thought Content: normal,  No delusions or paranoia; did not appear to be responding to internal stimuli during the session  Orientation: grossly intact  Memory: Grossly intact  Attention Span/Concentration: Attends to session without distraction; reports no difficulty  Fund of Knowledge: average  Estimate of Intelligence: average from verbal skills and history  Cognition: grossly intact  Insight: patient has awareness of illness; adequate insight into own behavior and behavior of others  Judgment: the patient's behavior is adequate to circumstances      Interval history and content of current session: Patient discussed stressors since last visit (recent COVID diagnosis, financial strain, discord with son who lives in his home).  Patient's wife also had another miscarriage while patient was on Xeloda (at 5 months, baby had additional limb).  Patient states this miscarriage was less stressful than the one in May 2019. They also have a new grandbaby (3 months old) who has helped distract them from the loss.      Discussed current adaptation to disease and treatment status. Reports to be coping in an adequate manner. Discussed post-treatment emotional impacts. Patient feeling better now that back at work and resuming prior activities.  Feels he will continue to improve after he is able to see urology and get his hydrocele repaired.    Examined proactive behaviors that may be implemented to minimize or ameliorate psychosocial stressors secondary to diagnosis and treatment.  Continues to do breathing relaxation exercises. Is walking daily- 6k steps at work and 1 mile at home. Struggling to make progress with weight loss, but has made positive behavioral changes (no more snacking, no sodas, healthier home cooking).     Possible increase in recurrence fears around scans/doctor visits discussed and ways to ameliorate this reviewed.     Risk parameters:   Patient  reports no suicidal ideation  Patient reports no homicidal ideation  Patient reports no self-injurious behavior  Patient reports no violent behavior   Safety needs:  None at this time      Verbal deficits: None     Patient's response to intervention:The patient's response to intervention is accepting, motivated.     Progress toward goals and other mental status changes:  The patient's progress toward goals is good.      Progress to date:Progress as Expected      Goals from last visit: Met      Patient reported outcomes:      Distress Thermometer:   Distress Score    Distress Score: 5        Practical Problems Physical Problems   : No Appearance: No   Housing: No Bathing / Dressing: No   Insurance / Financial: No Breathing: No    Transportation: No  Changes in Urination: No    Work / School: No  Constipation: No   Treatment Decisions: No  Diarrhea: No     Eating: No    Family Problems Fatigue: No    Dealing with Children: No Feeling Swollen: No    Dealing with Partner: No Fevers: No    Ability to Have Children: No  Getting Around: No       Indigestion: No     Memory / Concentration: No   Emotional Problems Mouth Sores: No    Depression: No  Nausea: No    Fears: No  Nose Dry / Congested: No    Nervousness: No  Pain: No    Sadness: No Sexual: No    Worry: No Skin Dry / Itchy: No    Loss of Interest in Usual Activities: No Sleep: No     Substance Abuse: No    Spiritual/Religions Concerns Tingling in Hands / Feet: No   Spritual / Shinto Concerns: No         Other Problems              PHQ-9=  Initial visit: 8    PHQ ANSWERS    Q1. Little interest or pleasure in doing things: Not at all (12/21/20 1603)  Q2. Feeling down, depressed, or hopeless: Not at all (12/21/20 1603)  Q3. Trouble falling or staying asleep, or sleeping too much: Several days (12/21/20 1603)  Q4. Feeling tired or having little energy: Several days (12/21/20 1603)  Q5. Poor appetite or overeating: Not at all (12/21/20 1603)  Q6. Feeling bad  about yourself - or that you are a failure or have let yourself or your family down: Not at all (12/21/20 1603)  Q7. Trouble concentrating on things, such as reading the newspaper or watching television: Not at all (12/21/20 1603)  Q8. Moving or speaking so slowly that other people could have noticed. Or the opposite - being so fidgety or restless that you have been moving around a lot more than usual: Not at all (12/21/20 1603)  Q9. Thoughts that you would be better off dead, or of hurting yourself in some way: (P) Not at all (12/21/20 1603)    PHQ8 Score : 2 (12/21/20 1603)  PHQ-9 Total Score: (P) 2 (12/21/20 1603)        BILL-7= Initial visit: 8     GAD7 12/21/2020   1. Feeling nervous, anxious, or on edge? 0   2. Not being able to stop or control worrying? 0   3. Worrying too much about different things? 0   4. Trouble relaxing? 1   5. Being so restless that it is hard to sit still? 0   6. Becoming easily annoyed or irritable? 1   7. Feeling afraid as if something awful might happen? 0   BILL-7 Score 2         Client Strengths: verbal, intelligent, successful, good social support, commitment to wellness, strong guadalupe, strong cultural traditions      Treatment Plan:individual psychotherapy  · Target symptoms: adjustment  · Why chosen therapy is appropriate versus another modality: relevant to diagnosis, patient responds to this modality, evidence based practice  · Outcome monitoring methods: self-report, observation, checklist/rating scale  · Therapeutic intervention type: behavior modifying psychotherapy  · Prognosis: Excellent      Behavioral goals:    Exercise: continue walking and increase duration   Stress management: continue meditation practice daily   Social engagement:   Nutrition: maintain healthy lifestyle changes, consultation with bariatrics when insurance kicks in   Smoking Cessation:   Therapy:  Sleep medicine follow-up when able    Return to clinic: as needed     Length of Service (minutes direct  face-to-face contact): 30      ICD-10-CM ICD-9-CM   1. Adjustment disorder with mixed anxiety and depressed mood  F43.23 309.28         Matthew Sandoval, PhD  [place] License #[id number]

## 2020-12-21 NOTE — TELEPHONE ENCOUNTER
Pt contacted through Covid Surveillance Program for No response escalation. Just got home from infusion. Will take vitals now and submit entry. States he iSpeaking in complete sentences without difficulty. No audible wheezes noted. Advised to call back with concerns or worsening condition. Verbalized understanding. s feeling well.       Prior to closing chart, pt submitted entry. VS and symptoms WNL and do not require additional escalation and no further outreach required.         Reason for Disposition   Information only question and nurse able to answer    Additional Information   Negative: Nursing judgment   Negative: Nursing judgment   Negative: Nursing judgment   Negative: Nursing judgment    Protocols used: INFORMATION ONLY CALL - NO TRIAGE-A-[place]

## 2020-12-21 NOTE — PROGRESS NOTES
Patient arrives for bamlanivimab infusion    Symptoms - states slumptoms started one week ago , pain behind  Eyes, nausea ,weakness, dry mouth    Patient received infusion according to FDA recommendations and [facility] SOP without complications noted and left with mask in place and drug fact sheet provided

## 2020-12-21 NOTE — PROGRESS NOTES
Pt emailed BRANDEN his sleep study at SW request.  With pt's permission, Branden forwarded study to [facility],  for Home Sleep delivered and requested he provide Sw info on next steps.  Pt is currently infected with Covid, therefore F:F meetings will be postponed.    Hola Azevedo  Office [phone]  Cell  [phone]  Fax  [phone]  [email or web address]   BRANDEN remains available.

## 2020-12-22 PROBLEM — U07.1 PNEUMONIA DUE TO COVID-19 VIRUS: Status: ACTIVE | Noted: 2020-12-22

## 2020-12-22 PROBLEM — J12.82 PNEUMONIA DUE TO COVID-19 VIRUS: Status: ACTIVE | Noted: 2020-12-22

## 2020-12-22 LAB
25(OH)D3+25(OH)D2 SERPL-MCNC: 20 NG/ML (ref 30–96)
ALBUMIN SERPL BCP-MCNC: 2.8 G/DL (ref 3.5–5.2)
ALBUMIN SERPL BCP-MCNC: 3.2 G/DL (ref 3.5–5.2)
ALP SERPL-CCNC: 60 U/L (ref 55–135)
ALP SERPL-CCNC: 70 U/L (ref 55–135)
ALT SERPL W/O P-5'-P-CCNC: 26 U/L (ref 10–44)
ALT SERPL W/O P-5'-P-CCNC: 31 U/L (ref 10–44)
ANION GAP SERPL CALC-SCNC: 11 MMOL/L (ref 8–16)
ANION GAP SERPL CALC-SCNC: 8 MMOL/L (ref 8–16)
APTT BLDCRRT: 22.8 SEC (ref 21–32)
AST SERPL-CCNC: 25 U/L (ref 10–40)
AST SERPL-CCNC: 31 U/L (ref 10–40)
BACTERIA #/AREA URNS AUTO: NORMAL /HPF
BACTERIA SPEC AEROBE CULT: NORMAL
BASOPHILS # BLD AUTO: 0.01 K/UL (ref 0–0.2)
BASOPHILS NFR BLD: 0.2 % (ref 0–1.9)
BILIRUB SERPL-MCNC: 0.7 MG/DL (ref 0.1–1)
BILIRUB SERPL-MCNC: 0.7 MG/DL (ref 0.1–1)
BILIRUB UR QL STRIP: NEGATIVE
BNP SERPL-MCNC: <10 PG/ML (ref 0–99)
BUN SERPL-MCNC: 13 MG/DL (ref 6–20)
BUN SERPL-MCNC: 14 MG/DL (ref 6–20)
CALCIUM SERPL-MCNC: 7.5 MG/DL (ref 8.7–10.5)
CALCIUM SERPL-MCNC: 8.7 MG/DL (ref 8.7–10.5)
CHLORIDE SERPL-SCNC: 102 MMOL/L (ref 95–110)
CHLORIDE SERPL-SCNC: 106 MMOL/L (ref 95–110)
CHOLEST SERPL-MCNC: 145 MG/DL (ref 120–199)
CHOLEST/HDLC SERPL: 4.7 {RATIO} (ref 2–5)
CK SERPL-CCNC: 143 U/L (ref 20–200)
CLARITY UR REFRACT.AUTO: CLEAR
CO2 SERPL-SCNC: 23 MMOL/L (ref 23–29)
CO2 SERPL-SCNC: 24 MMOL/L (ref 23–29)
COLOR UR AUTO: YELLOW
CREAT SERPL-MCNC: 1.2 MG/DL (ref 0.5–1.4)
CREAT SERPL-MCNC: 1.2 MG/DL (ref 0.5–1.4)
CRP SERPL-MCNC: 191.5 MG/L (ref 0–8.2)
D DIMER PPP IA.FEU-MCNC: 0.71 MG/L FEU
DIFFERENTIAL METHOD: ABNORMAL
EOSINOPHIL # BLD AUTO: 0 K/UL (ref 0–0.5)
EOSINOPHIL NFR BLD: 0 % (ref 0–8)
ERYTHROCYTE [DISTWIDTH] IN BLOOD BY AUTOMATED COUNT: 13.8 % (ref 11.5–14.5)
ERYTHROCYTE [SEDIMENTATION RATE] IN BLOOD BY WESTERGREN METHOD: 103 MM/HR (ref 0–23)
EST. GFR  (AFRICAN AMERICAN): >60 ML/MIN/1.73 M^2
EST. GFR  (AFRICAN AMERICAN): >60 ML/MIN/1.73 M^2
EST. GFR  (NON AFRICAN AMERICAN): >60 ML/MIN/1.73 M^2
EST. GFR  (NON AFRICAN AMERICAN): >60 ML/MIN/1.73 M^2
ESTIMATED AVG GLUCOSE: 114 MG/DL (ref 68–131)
FERRITIN SERPL-MCNC: 427 NG/ML (ref 20–300)
GLUCOSE SERPL-MCNC: 116 MG/DL (ref 70–110)
GLUCOSE SERPL-MCNC: 86 MG/DL (ref 70–110)
GLUCOSE UR QL STRIP: NEGATIVE
GRAM STN SPEC: NORMAL
GRAM STN SPEC: NORMAL
HBA1C MFR BLD HPLC: 5.6 % (ref 4–5.6)
HCT VFR BLD AUTO: 37.7 % (ref 40–54)
HDLC SERPL-MCNC: 31 MG/DL (ref 40–75)
HDLC SERPL: 21.4 % (ref 20–50)
HGB BLD-MCNC: 11.3 G/DL (ref 14–18)
HGB UR QL STRIP: NEGATIVE
HYALINE CASTS UR QL AUTO: 0 /LPF
IMM GRANULOCYTES # BLD AUTO: 0.03 K/UL (ref 0–0.04)
IMM GRANULOCYTES NFR BLD AUTO: 0.6 % (ref 0–0.5)
INFLUENZA A, MOLECULAR: NEGATIVE
INFLUENZA B, MOLECULAR: NEGATIVE
INR PPP: 1 (ref 0.8–1.2)
KETONES UR QL STRIP: ABNORMAL
LDH SERPL L TO P-CCNC: 663 U/L (ref 110–260)
LDLC SERPL CALC-MCNC: 98 MG/DL (ref 63–159)
LEUKOCYTE ESTERASE UR QL STRIP: NEGATIVE
LYMPHOCYTES # BLD AUTO: 0.7 K/UL (ref 1–4.8)
LYMPHOCYTES NFR BLD: 12.9 % (ref 18–48)
MAGNESIUM SERPL-MCNC: 1.8 MG/DL (ref 1.6–2.6)
MCH RBC QN AUTO: 25.5 PG (ref 27–31)
MCHC RBC AUTO-ENTMCNC: 30 G/DL (ref 32–36)
MCV RBC AUTO: 85 FL (ref 82–98)
MICROSCOPIC COMMENT: NORMAL
MONOCYTES # BLD AUTO: 0.2 K/UL (ref 0.3–1)
MONOCYTES NFR BLD: 4.8 % (ref 4–15)
NEUTROPHILS # BLD AUTO: 4.1 K/UL (ref 1.8–7.7)
NEUTROPHILS NFR BLD: 81.5 % (ref 38–73)
NITRITE UR QL STRIP: NEGATIVE
NONHDLC SERPL-MCNC: 114 MG/DL
NRBC BLD-RTO: 0 /100 WBC
PH UR STRIP: 5 [PH] (ref 5–8)
PHOSPHATE SERPL-MCNC: 3.5 MG/DL (ref 2.7–4.5)
PLATELET # BLD AUTO: 133 K/UL (ref 150–350)
PMV BLD AUTO: 12.9 FL (ref 9.2–12.9)
POCT GLUCOSE: 117 MG/DL (ref 70–110)
POCT GLUCOSE: 127 MG/DL (ref 70–110)
POCT GLUCOSE: 141 MG/DL (ref 70–110)
POCT GLUCOSE: 147 MG/DL (ref 70–110)
POTASSIUM SERPL-SCNC: 3.8 MMOL/L (ref 3.5–5.1)
POTASSIUM SERPL-SCNC: 5.2 MMOL/L (ref 3.5–5.1)
PROCALCITONIN SERPL IA-MCNC: 0.1 NG/ML
PROT SERPL-MCNC: 6.5 G/DL (ref 6–8.4)
PROT SERPL-MCNC: 7.8 G/DL (ref 6–8.4)
PROT UR QL STRIP: ABNORMAL
PROTHROMBIN TIME: 10.7 SEC (ref 9–12.5)
RBC # BLD AUTO: 4.44 M/UL (ref 4.6–6.2)
RBC #/AREA URNS AUTO: 1 /HPF (ref 0–4)
SODIUM SERPL-SCNC: 136 MMOL/L (ref 136–145)
SODIUM SERPL-SCNC: 138 MMOL/L (ref 136–145)
SP GR UR STRIP: 1.02 (ref 1–1.03)
SPECIMEN SOURCE: NORMAL
TRIGL SERPL-MCNC: 80 MG/DL (ref 30–150)
TROPONIN I SERPL DL<=0.01 NG/ML-MCNC: 0.01 NG/ML (ref 0–0.03)
URN SPEC COLLECT METH UR: ABNORMAL
WBC # BLD AUTO: 5.02 K/UL (ref 3.9–12.7)
WBC #/AREA URNS AUTO: 0 /HPF (ref 0–5)

## 2020-12-22 PROCEDURE — 87070 CULTURE OTHR SPECIMN AEROBIC: CPT

## 2020-12-22 PROCEDURE — 80053 COMPREHEN METABOLIC PANEL: CPT

## 2020-12-22 PROCEDURE — 83880 ASSAY OF NATRIURETIC PEPTIDE: CPT

## 2020-12-22 PROCEDURE — 87449 NOS EACH ORGANISM AG IA: CPT

## 2020-12-22 PROCEDURE — 86140 C-REACTIVE PROTEIN: CPT

## 2020-12-22 PROCEDURE — 87205 SMEAR GRAM STAIN: CPT

## 2020-12-22 PROCEDURE — 83036 HEMOGLOBIN GLYCOSYLATED A1C: CPT

## 2020-12-22 PROCEDURE — 85379 FIBRIN DEGRADATION QUANT: CPT

## 2020-12-22 PROCEDURE — 11000001 HC ACUTE MED/SURG PRIVATE ROOM

## 2020-12-22 PROCEDURE — 25000003 PHARM REV CODE 250: Performed by: STUDENT IN AN ORGANIZED HEALTH CARE EDUCATION/TRAINING PROGRAM

## 2020-12-22 PROCEDURE — 85652 RBC SED RATE AUTOMATED: CPT

## 2020-12-22 PROCEDURE — 25000003 PHARM REV CODE 250: Performed by: PHYSICIAN ASSISTANT

## 2020-12-22 PROCEDURE — 80053 COMPREHEN METABOLIC PANEL: CPT | Mod: 91

## 2020-12-22 PROCEDURE — 36415 COLL VENOUS BLD VENIPUNCTURE: CPT

## 2020-12-22 PROCEDURE — 87502 INFLUENZA DNA AMP PROBE: CPT

## 2020-12-22 PROCEDURE — 84145 PROCALCITONIN (PCT): CPT

## 2020-12-22 PROCEDURE — 94640 AIRWAY INHALATION TREATMENT: CPT

## 2020-12-22 PROCEDURE — 85730 THROMBOPLASTIN TIME PARTIAL: CPT

## 2020-12-22 PROCEDURE — 81001 URINALYSIS AUTO W/SCOPE: CPT

## 2020-12-22 PROCEDURE — C9399 UNCLASSIFIED DRUGS OR BIOLOG: HCPCS | Performed by: PHYSICIAN ASSISTANT

## 2020-12-22 PROCEDURE — 87040 BLOOD CULTURE FOR BACTERIA: CPT

## 2020-12-22 PROCEDURE — 25000242 PHARM REV CODE 250 ALT 637 W/ HCPCS: Performed by: STUDENT IN AN ORGANIZED HEALTH CARE EDUCATION/TRAINING PROGRAM

## 2020-12-22 PROCEDURE — 99223 PR INITIAL HOSPITAL CARE,LEVL III: ICD-10-PCS | Mod: ,,, | Performed by: INTERNAL MEDICINE

## 2020-12-22 PROCEDURE — 94761 N-INVAS EAR/PLS OXIMETRY MLT: CPT

## 2020-12-22 PROCEDURE — 85025 COMPLETE CBC W/AUTO DIFF WBC: CPT

## 2020-12-22 PROCEDURE — 82306 VITAMIN D 25 HYDROXY: CPT

## 2020-12-22 PROCEDURE — 85610 PROTHROMBIN TIME: CPT

## 2020-12-22 PROCEDURE — 99223 1ST HOSP IP/OBS HIGH 75: CPT | Mod: ,,, | Performed by: INTERNAL MEDICINE

## 2020-12-22 PROCEDURE — 84100 ASSAY OF PHOSPHORUS: CPT

## 2020-12-22 PROCEDURE — 83735 ASSAY OF MAGNESIUM: CPT

## 2020-12-22 PROCEDURE — 94660 CPAP INITIATION&MGMT: CPT

## 2020-12-22 PROCEDURE — 99900035 HC TECH TIME PER 15 MIN (STAT)

## 2020-12-22 PROCEDURE — 63600175 PHARM REV CODE 636 W HCPCS: Performed by: PHYSICIAN ASSISTANT

## 2020-12-22 PROCEDURE — 80061 LIPID PANEL: CPT

## 2020-12-22 PROCEDURE — 82550 ASSAY OF CK (CPK): CPT

## 2020-12-22 PROCEDURE — 83615 LACTATE (LD) (LDH) ENZYME: CPT

## 2020-12-22 RX ORDER — GLUCAGON 1 MG
1 KIT INJECTION
Status: DISCONTINUED | OUTPATIENT
Start: 2020-12-22 | End: 2020-12-24 | Stop reason: HOSPADM

## 2020-12-22 RX ORDER — GABAPENTIN 300 MG/1
300 CAPSULE ORAL 3 TIMES DAILY
Status: DISCONTINUED | OUTPATIENT
Start: 2020-12-22 | End: 2020-12-24 | Stop reason: HOSPADM

## 2020-12-22 RX ORDER — CEFTRIAXONE 1 G/1
1 INJECTION, POWDER, FOR SOLUTION INTRAMUSCULAR; INTRAVENOUS NIGHTLY
Status: DISCONTINUED | OUTPATIENT
Start: 2020-12-22 | End: 2020-12-22

## 2020-12-22 RX ORDER — ALBUTEROL SULFATE 90 UG/1
2 AEROSOL, METERED RESPIRATORY (INHALATION) EVERY 6 HOURS PRN
Status: DISCONTINUED | OUTPATIENT
Start: 2020-12-22 | End: 2020-12-22

## 2020-12-22 RX ORDER — ENOXAPARIN SODIUM 100 MG/ML
40 INJECTION SUBCUTANEOUS EVERY 12 HOURS
Status: DISCONTINUED | OUTPATIENT
Start: 2020-12-22 | End: 2020-12-23

## 2020-12-22 RX ORDER — SODIUM CHLORIDE 0.9 % (FLUSH) 0.9 %
10 SYRINGE (ML) INJECTION
Status: DISCONTINUED | OUTPATIENT
Start: 2020-12-22 | End: 2020-12-24 | Stop reason: HOSPADM

## 2020-12-22 RX ORDER — ASCORBIC ACID 500 MG
500 TABLET ORAL 2 TIMES DAILY
Status: DISCONTINUED | OUTPATIENT
Start: 2020-12-22 | End: 2020-12-24 | Stop reason: HOSPADM

## 2020-12-22 RX ORDER — BENZONATATE 100 MG/1
100 CAPSULE ORAL 3 TIMES DAILY PRN
Status: DISCONTINUED | OUTPATIENT
Start: 2020-12-22 | End: 2020-12-24 | Stop reason: HOSPADM

## 2020-12-22 RX ORDER — ACETAMINOPHEN 325 MG/1
650 TABLET ORAL EVERY 4 HOURS PRN
Status: DISCONTINUED | OUTPATIENT
Start: 2020-12-22 | End: 2020-12-24 | Stop reason: HOSPADM

## 2020-12-22 RX ORDER — INSULIN ASPART 100 [IU]/ML
8 INJECTION, SOLUTION INTRAVENOUS; SUBCUTANEOUS
Status: DISCONTINUED | OUTPATIENT
Start: 2020-12-22 | End: 2020-12-24 | Stop reason: HOSPADM

## 2020-12-22 RX ORDER — AZITHROMYCIN 250 MG/1
500 TABLET, FILM COATED ORAL
Status: DISCONTINUED | OUTPATIENT
Start: 2020-12-22 | End: 2020-12-22

## 2020-12-22 RX ORDER — ATORVASTATIN CALCIUM 20 MG/1
40 TABLET, FILM COATED ORAL NIGHTLY
Status: DISCONTINUED | OUTPATIENT
Start: 2020-12-22 | End: 2020-12-24 | Stop reason: HOSPADM

## 2020-12-22 RX ORDER — IBUPROFEN 200 MG
16 TABLET ORAL
Status: DISCONTINUED | OUTPATIENT
Start: 2020-12-22 | End: 2020-12-24 | Stop reason: HOSPADM

## 2020-12-22 RX ORDER — ATORVASTATIN CALCIUM 20 MG/1
20 TABLET, FILM COATED ORAL DAILY
Status: DISCONTINUED | OUTPATIENT
Start: 2020-12-22 | End: 2020-12-22

## 2020-12-22 RX ORDER — IBUPROFEN 200 MG
24 TABLET ORAL
Status: DISCONTINUED | OUTPATIENT
Start: 2020-12-22 | End: 2020-12-24 | Stop reason: HOSPADM

## 2020-12-22 RX ORDER — ALBUTEROL SULFATE 90 UG/1
2 AEROSOL, METERED RESPIRATORY (INHALATION)
Status: DISCONTINUED | OUTPATIENT
Start: 2020-12-22 | End: 2020-12-24 | Stop reason: HOSPADM

## 2020-12-22 RX ORDER — TALC
6 POWDER (GRAM) TOPICAL NIGHTLY PRN
Status: CANCELLED | OUTPATIENT
Start: 2020-12-22

## 2020-12-22 RX ORDER — ATORVASTATIN CALCIUM 20 MG/1
40 TABLET, FILM COATED ORAL DAILY
Status: DISCONTINUED | OUTPATIENT
Start: 2020-12-23 | End: 2020-12-22

## 2020-12-22 RX ORDER — ONDANSETRON 2 MG/ML
4 INJECTION INTRAMUSCULAR; INTRAVENOUS EVERY 8 HOURS PRN
Status: DISCONTINUED | OUTPATIENT
Start: 2020-12-22 | End: 2020-12-24 | Stop reason: HOSPADM

## 2020-12-22 RX ORDER — TALC
6 POWDER (GRAM) TOPICAL NIGHTLY PRN
Status: DISCONTINUED | OUTPATIENT
Start: 2020-12-22 | End: 2020-12-24 | Stop reason: HOSPADM

## 2020-12-22 RX ORDER — MUPIROCIN 20 MG/G
OINTMENT TOPICAL 2 TIMES DAILY
Status: DISCONTINUED | OUTPATIENT
Start: 2020-12-22 | End: 2020-12-24 | Stop reason: HOSPADM

## 2020-12-22 RX ORDER — LOSARTAN POTASSIUM 50 MG/1
50 TABLET ORAL DAILY
Status: DISCONTINUED | OUTPATIENT
Start: 2020-12-22 | End: 2020-12-24 | Stop reason: HOSPADM

## 2020-12-22 RX ORDER — LOPERAMIDE HYDROCHLORIDE 2 MG/1
2 CAPSULE ORAL EVERY 6 HOURS PRN
Status: DISCONTINUED | OUTPATIENT
Start: 2020-12-22 | End: 2020-12-24 | Stop reason: HOSPADM

## 2020-12-22 RX ORDER — HYDROCHLOROTHIAZIDE 25 MG/1
25 TABLET ORAL DAILY
Status: DISCONTINUED | OUTPATIENT
Start: 2020-12-22 | End: 2020-12-24 | Stop reason: HOSPADM

## 2020-12-22 RX ORDER — LANOLIN ALCOHOL/MO/W.PET/CERES
400 CREAM (GRAM) TOPICAL ONCE
Status: COMPLETED | OUTPATIENT
Start: 2020-12-22 | End: 2020-12-22

## 2020-12-22 RX ORDER — CHOLECALCIFEROL (VITAMIN D3) 25 MCG
1000 TABLET ORAL DAILY
Status: DISCONTINUED | OUTPATIENT
Start: 2020-12-22 | End: 2020-12-24 | Stop reason: HOSPADM

## 2020-12-22 RX ORDER — AMLODIPINE BESYLATE 10 MG/1
10 TABLET ORAL DAILY
Status: DISCONTINUED | OUTPATIENT
Start: 2020-12-22 | End: 2020-12-24 | Stop reason: HOSPADM

## 2020-12-22 RX ADMIN — CHOLECALCIFEROL (VITAMIN D3) 25 MCG (1,000 UNIT) TABLET 1000 UNITS: TABLET at 09:12

## 2020-12-22 RX ADMIN — MUPIROCIN: 20 OINTMENT TOPICAL at 09:12

## 2020-12-22 RX ADMIN — ATORVASTATIN CALCIUM 20 MG: 20 TABLET, FILM COATED ORAL at 09:12

## 2020-12-22 RX ADMIN — REMDESIVIR 200 MG: 100 INJECTION, POWDER, LYOPHILIZED, FOR SOLUTION INTRAVENOUS at 04:12

## 2020-12-22 RX ADMIN — INSULIN DETEMIR 23 UNITS: 100 INJECTION, SOLUTION SUBCUTANEOUS at 09:12

## 2020-12-22 RX ADMIN — ALBUTEROL SULFATE 2 PUFF: 90 AEROSOL, METERED RESPIRATORY (INHALATION) at 07:12

## 2020-12-22 RX ADMIN — OXYCODONE HYDROCHLORIDE AND ACETAMINOPHEN 500 MG: 500 TABLET ORAL at 09:12

## 2020-12-22 RX ADMIN — GABAPENTIN 300 MG: 300 CAPSULE ORAL at 08:12

## 2020-12-22 RX ADMIN — INSULIN ASPART 8 UNITS: 100 INJECTION, SOLUTION INTRAVENOUS; SUBCUTANEOUS at 04:12

## 2020-12-22 RX ADMIN — ENOXAPARIN SODIUM 40 MG: 40 INJECTION SUBCUTANEOUS at 09:12

## 2020-12-22 RX ADMIN — AMLODIPINE BESYLATE 10 MG: 10 TABLET ORAL at 09:12

## 2020-12-22 RX ADMIN — SODIUM ZIRCONIUM CYCLOSILICATE 5 G: 5 POWDER, FOR SUSPENSION ORAL at 01:12

## 2020-12-22 RX ADMIN — DEXAMETHASONE 6 MG: 4 TABLET ORAL at 09:12

## 2020-12-22 RX ADMIN — MAGNESIUM OXIDE 400 MG (241.3 MG MAGNESIUM) TABLET 400 MG: TABLET at 01:12

## 2020-12-22 RX ADMIN — OXYCODONE HYDROCHLORIDE AND ACETAMINOPHEN 500 MG: 500 TABLET ORAL at 08:12

## 2020-12-22 RX ADMIN — GABAPENTIN 300 MG: 300 CAPSULE ORAL at 09:12

## 2020-12-22 RX ADMIN — HYDROCHLOROTHIAZIDE 25 MG: 25 TABLET ORAL at 09:12

## 2020-12-22 RX ADMIN — ATORVASTATIN CALCIUM 40 MG: 20 TABLET, FILM COATED ORAL at 08:12

## 2020-12-22 RX ADMIN — BENZONATATE 100 MG: 100 CAPSULE ORAL at 08:12

## 2020-12-22 RX ADMIN — Medication 6 MG: at 08:12

## 2020-12-22 RX ADMIN — GABAPENTIN 300 MG: 300 CAPSULE ORAL at 04:12

## 2020-12-22 RX ADMIN — MUPIROCIN: 20 OINTMENT TOPICAL at 08:12

## 2020-12-22 RX ADMIN — THERA TABS 1 TABLET: TAB at 09:12

## 2020-12-22 RX ADMIN — ENOXAPARIN SODIUM 40 MG: 40 INJECTION SUBCUTANEOUS at 08:12

## 2020-12-22 RX ADMIN — INSULIN ASPART 8 UNITS: 100 INJECTION, SOLUTION INTRAVENOUS; SUBCUTANEOUS at 12:12

## 2020-12-22 RX ADMIN — LOSARTAN POTASSIUM 50 MG: 50 TABLET, FILM COATED ORAL at 09:12

## 2020-12-22 RX ADMIN — INSULIN ASPART 8 UNITS: 100 INJECTION, SOLUTION INTRAVENOUS; SUBCUTANEOUS at 09:12

## 2020-12-22 NOTE — TELEPHONE ENCOUNTER
Patient initially escalated due to 94% SpO2, however patient took new vitals during phone call after taking a few deep breaths, and coughing; patient was unable to get SpO2 to stabilize above 94%: see vital signs and symptoms below:    Sp02: 94  Pulse: 90  Temperature: 99.2  SOB at rest (0-5): 1  SOB with activity (0-5): 1  Worsening symptoms: no  Fever symptoms:no    Handoff message sent to LAUREEN Byers and OZIEL Logan for review and management; OZIEL Alcantara replied and is contacting patient.    Reason for Disposition   RN needs further essential information from caller in order to complete triage    Protocols used: INFORMATION ONLY CALL - NO TRIAGE-A-

## 2020-12-22 NOTE — TELEPHONE ENCOUNTER
Called patient due to RN escalation in COVID Surveillance program. Pt escalated due to O2 sat 94%.     Patient location: [place], home    Vitals: Sp02 : 94%. P: 92. Temp: 99.2  Ambulatory Sp02 on the phone (if applicable):      45 y.o. male with pertinent PMHx htn on day 7 of Covid symptoms. Positive Covid screen 12/18/2020. CXR on 12/18/2020 showed Mild bilateral peripheral patchy alveolar infiltrates could be associated with viral pneumonia.  Home oxygen: no. COVID-19 Hospitalization History:     HPI:  Recheck on O2 sat 95%. Denies any shortness of breath at rest or minimal exertion. Denies any wheezing. C/o cough productive. Denies any other symptoms. On azithromycin. Received antibody infusion today. Patient states he does not think his pulse ox is working properly since his fingers are cold.     ROS: Denies worsening cough, light headedness, fever, chills, diaphoresis, chest pain, abdominal pain, emesis, diarrhea or further symptoms.     Assessment: Vitals appear WNL. During phone call, patient appears alert and oriented. Able to speak in full sentences without difficulty. No audible wheezing heard during call.     Plan:    Reviewed with patient the reasons for seeking emergency care. Pt aware that if Sp02 <94% or if they have any worsening symptoms, they need to go to the emergency department. If they are having a medical emergency, they will call 911. Otherwise, patient will continue to submit data as scheduled. Reviewed importance of wearing mask if self or family members leave the house.     Advised next steps: Continue care at home

## 2020-12-23 ENCOUNTER — TELEPHONE (OUTPATIENT)
Dept: FAMILY MEDICINE | Facility: CLINIC | Age: 45
End: 2020-12-23

## 2020-12-23 ENCOUNTER — PATIENT MESSAGE (OUTPATIENT)
Dept: PSYCHIATRY | Facility: CLINIC | Age: 45
End: 2020-12-23

## 2020-12-23 LAB
BASOPHILS # BLD AUTO: 0.01 K/UL (ref 0–0.2)
BASOPHILS NFR BLD: 0.3 % (ref 0–1.9)
DIFFERENTIAL METHOD: ABNORMAL
EOSINOPHIL # BLD AUTO: 0 K/UL (ref 0–0.5)
EOSINOPHIL NFR BLD: 0 % (ref 0–8)
ERYTHROCYTE [DISTWIDTH] IN BLOOD BY AUTOMATED COUNT: 13.5 % (ref 11.5–14.5)
HCT VFR BLD AUTO: 37.3 % (ref 40–54)
HGB BLD-MCNC: 11.5 G/DL (ref 14–18)
IMM GRANULOCYTES # BLD AUTO: 0.02 K/UL (ref 0–0.04)
IMM GRANULOCYTES NFR BLD AUTO: 0.5 % (ref 0–0.5)
LYMPHOCYTES # BLD AUTO: 0.9 K/UL (ref 1–4.8)
LYMPHOCYTES NFR BLD: 23.5 % (ref 18–48)
MAGNESIUM SERPL-MCNC: 2.2 MG/DL (ref 1.6–2.6)
MCH RBC QN AUTO: 25.8 PG (ref 27–31)
MCHC RBC AUTO-ENTMCNC: 30.8 G/DL (ref 32–36)
MCV RBC AUTO: 84 FL (ref 82–98)
MONOCYTES # BLD AUTO: 0.4 K/UL (ref 0.3–1)
MONOCYTES NFR BLD: 9.9 % (ref 4–15)
NEUTROPHILS # BLD AUTO: 2.5 K/UL (ref 1.8–7.7)
NEUTROPHILS NFR BLD: 65.8 % (ref 38–73)
NRBC BLD-RTO: 0 /100 WBC
PHOSPHATE SERPL-MCNC: 4 MG/DL (ref 2.7–4.5)
PLATELET # BLD AUTO: 219 K/UL (ref 150–350)
PLATELET BLD QL SMEAR: ABNORMAL
PMV BLD AUTO: 10.7 FL (ref 9.2–12.9)
POCT GLUCOSE: 128 MG/DL (ref 70–110)
POCT GLUCOSE: 133 MG/DL (ref 70–110)
POCT GLUCOSE: 133 MG/DL (ref 70–110)
POCT GLUCOSE: 190 MG/DL (ref 70–110)
RBC # BLD AUTO: 4.46 M/UL (ref 4.6–6.2)
WBC # BLD AUTO: 3.83 K/UL (ref 3.9–12.7)

## 2020-12-23 PROCEDURE — 25000003 PHARM REV CODE 250: Performed by: STUDENT IN AN ORGANIZED HEALTH CARE EDUCATION/TRAINING PROGRAM

## 2020-12-23 PROCEDURE — 63600175 PHARM REV CODE 636 W HCPCS: Performed by: PHYSICIAN ASSISTANT

## 2020-12-23 PROCEDURE — 25000242 PHARM REV CODE 250 ALT 637 W/ HCPCS: Performed by: STUDENT IN AN ORGANIZED HEALTH CARE EDUCATION/TRAINING PROGRAM

## 2020-12-23 PROCEDURE — 99233 PR SUBSEQUENT HOSPITAL CARE,LEVL III: ICD-10-PCS | Mod: ,,, | Performed by: STUDENT IN AN ORGANIZED HEALTH CARE EDUCATION/TRAINING PROGRAM

## 2020-12-23 PROCEDURE — 94660 CPAP INITIATION&MGMT: CPT

## 2020-12-23 PROCEDURE — 63600175 PHARM REV CODE 636 W HCPCS: Performed by: STUDENT IN AN ORGANIZED HEALTH CARE EDUCATION/TRAINING PROGRAM

## 2020-12-23 PROCEDURE — 27000221 HC OXYGEN, UP TO 24 HOURS

## 2020-12-23 PROCEDURE — 83735 ASSAY OF MAGNESIUM: CPT

## 2020-12-23 PROCEDURE — 11000001 HC ACUTE MED/SURG PRIVATE ROOM

## 2020-12-23 PROCEDURE — 99233 SBSQ HOSP IP/OBS HIGH 50: CPT | Mod: ,,, | Performed by: STUDENT IN AN ORGANIZED HEALTH CARE EDUCATION/TRAINING PROGRAM

## 2020-12-23 PROCEDURE — 85025 COMPLETE CBC W/AUTO DIFF WBC: CPT

## 2020-12-23 PROCEDURE — 25000003 PHARM REV CODE 250: Performed by: PHYSICIAN ASSISTANT

## 2020-12-23 PROCEDURE — C9399 UNCLASSIFIED DRUGS OR BIOLOG: HCPCS | Performed by: PHYSICIAN ASSISTANT

## 2020-12-23 PROCEDURE — 99900035 HC TECH TIME PER 15 MIN (STAT)

## 2020-12-23 PROCEDURE — 84100 ASSAY OF PHOSPHORUS: CPT

## 2020-12-23 PROCEDURE — 94640 AIRWAY INHALATION TREATMENT: CPT

## 2020-12-23 PROCEDURE — 94761 N-INVAS EAR/PLS OXIMETRY MLT: CPT

## 2020-12-23 PROCEDURE — 36415 COLL VENOUS BLD VENIPUNCTURE: CPT

## 2020-12-23 RX ORDER — ENOXAPARIN SODIUM 100 MG/ML
1 INJECTION SUBCUTANEOUS EVERY 12 HOURS
Status: DISCONTINUED | OUTPATIENT
Start: 2020-12-23 | End: 2020-12-24 | Stop reason: HOSPADM

## 2020-12-23 RX ADMIN — ENOXAPARIN SODIUM 180 MG: 100 INJECTION SUBCUTANEOUS at 09:12

## 2020-12-23 RX ADMIN — GABAPENTIN 300 MG: 300 CAPSULE ORAL at 08:12

## 2020-12-23 RX ADMIN — LOSARTAN POTASSIUM 50 MG: 50 TABLET, FILM COATED ORAL at 08:12

## 2020-12-23 RX ADMIN — MUPIROCIN: 20 OINTMENT TOPICAL at 08:12

## 2020-12-23 RX ADMIN — THERA TABS 1 TABLET: TAB at 08:12

## 2020-12-23 RX ADMIN — INSULIN ASPART 8 UNITS: 100 INJECTION, SOLUTION INTRAVENOUS; SUBCUTANEOUS at 11:12

## 2020-12-23 RX ADMIN — REMDESIVIR 100 MG: 100 INJECTION, POWDER, LYOPHILIZED, FOR SOLUTION INTRAVENOUS at 05:12

## 2020-12-23 RX ADMIN — DEXAMETHASONE 6 MG: 4 TABLET ORAL at 08:12

## 2020-12-23 RX ADMIN — HYDROCHLOROTHIAZIDE 25 MG: 25 TABLET ORAL at 08:12

## 2020-12-23 RX ADMIN — ALBUTEROL SULFATE 2 PUFF: 90 AEROSOL, METERED RESPIRATORY (INHALATION) at 02:12

## 2020-12-23 RX ADMIN — ALBUTEROL SULFATE 2 PUFF: 90 AEROSOL, METERED RESPIRATORY (INHALATION) at 08:12

## 2020-12-23 RX ADMIN — INSULIN ASPART 8 UNITS: 100 INJECTION, SOLUTION INTRAVENOUS; SUBCUTANEOUS at 05:12

## 2020-12-23 RX ADMIN — GABAPENTIN 300 MG: 300 CAPSULE ORAL at 03:12

## 2020-12-23 RX ADMIN — INSULIN ASPART 8 UNITS: 100 INJECTION, SOLUTION INTRAVENOUS; SUBCUTANEOUS at 08:12

## 2020-12-23 RX ADMIN — AMLODIPINE BESYLATE 10 MG: 10 TABLET ORAL at 08:12

## 2020-12-23 RX ADMIN — CHOLECALCIFEROL (VITAMIN D3) 25 MCG (1,000 UNIT) TABLET 1000 UNITS: TABLET at 08:12

## 2020-12-23 RX ADMIN — OXYCODONE HYDROCHLORIDE AND ACETAMINOPHEN 500 MG: 500 TABLET ORAL at 08:12

## 2020-12-23 RX ADMIN — Medication 6 MG: at 08:12

## 2020-12-23 RX ADMIN — ENOXAPARIN SODIUM 40 MG: 40 INJECTION SUBCUTANEOUS at 08:12

## 2020-12-23 RX ADMIN — INSULIN DETEMIR 23 UNITS: 100 INJECTION, SOLUTION SUBCUTANEOUS at 11:12

## 2020-12-23 RX ADMIN — ATORVASTATIN CALCIUM 40 MG: 20 TABLET, FILM COATED ORAL at 08:12

## 2020-12-23 NOTE — TELEPHONE ENCOUNTER
----- Message from Renee Zambrano sent at 12/23/2020 11:18 AM CST -----  Contact: [facility]  Type: Needs [facility]    Who Called: [facility]  Best Call Back Number: [phone]-call patient    Additional Info-pt needs post hospital apt.  First avail is 3.9.2021 on scheduling desk.    Needing to be 10-14 days from now..   Please Advise-Thank you~

## 2020-12-24 VITALS
HEART RATE: 75 BPM | HEIGHT: 74 IN | SYSTOLIC BLOOD PRESSURE: 136 MMHG | DIASTOLIC BLOOD PRESSURE: 72 MMHG | OXYGEN SATURATION: 97 % | BODY MASS INDEX: 40.43 KG/M2 | WEIGHT: 315 LBS | TEMPERATURE: 98 F | RESPIRATION RATE: 28 BRPM

## 2020-12-24 PROBLEM — E66.01 MORBID OBESITY: Status: ACTIVE | Noted: 2020-12-24

## 2020-12-24 LAB
ALBUMIN SERPL BCP-MCNC: 3.2 G/DL (ref 3.5–5.2)
ALP SERPL-CCNC: 57 U/L (ref 55–135)
ALT SERPL W/O P-5'-P-CCNC: 74 U/L (ref 10–44)
ANION GAP SERPL CALC-SCNC: 11 MMOL/L (ref 8–16)
AST SERPL-CCNC: 54 U/L (ref 10–40)
BILIRUB SERPL-MCNC: 0.5 MG/DL (ref 0.1–1)
BUN SERPL-MCNC: 22 MG/DL (ref 6–20)
CALCIUM SERPL-MCNC: 9.3 MG/DL (ref 8.7–10.5)
CHLORIDE SERPL-SCNC: 104 MMOL/L (ref 95–110)
CO2 SERPL-SCNC: 23 MMOL/L (ref 23–29)
CREAT SERPL-MCNC: 1 MG/DL (ref 0.5–1.4)
CRP SERPL-MCNC: 80.3 MG/L (ref 0–8.2)
D DIMER PPP IA.FEU-MCNC: 0.34 MG/L FEU
EST. GFR  (AFRICAN AMERICAN): >60 ML/MIN/1.73 M^2
EST. GFR  (NON AFRICAN AMERICAN): >60 ML/MIN/1.73 M^2
FERRITIN SERPL-MCNC: 580 NG/ML (ref 20–300)
GLUCOSE SERPL-MCNC: 168 MG/DL (ref 70–110)
LDH SERPL L TO P-CCNC: 267 U/L (ref 110–260)
POCT GLUCOSE: 116 MG/DL (ref 70–110)
POCT GLUCOSE: 134 MG/DL (ref 70–110)
POCT GLUCOSE: 136 MG/DL (ref 70–110)
POTASSIUM SERPL-SCNC: 4.5 MMOL/L (ref 3.5–5.1)
PROT SERPL-MCNC: 7.7 G/DL (ref 6–8.4)
SODIUM SERPL-SCNC: 138 MMOL/L (ref 136–145)

## 2020-12-24 PROCEDURE — 80053 COMPREHEN METABOLIC PANEL: CPT

## 2020-12-24 PROCEDURE — 36415 COLL VENOUS BLD VENIPUNCTURE: CPT

## 2020-12-24 PROCEDURE — 99239 PR HOSPITAL DISCHARGE DAY,>30 MIN: ICD-10-PCS | Mod: ,,, | Performed by: HOSPITALIST

## 2020-12-24 PROCEDURE — 99239 HOSP IP/OBS DSCHRG MGMT >30: CPT | Mod: ,,, | Performed by: HOSPITALIST

## 2020-12-24 PROCEDURE — 82728 ASSAY OF FERRITIN: CPT

## 2020-12-24 PROCEDURE — 83615 LACTATE (LD) (LDH) ENZYME: CPT

## 2020-12-24 PROCEDURE — 99900035 HC TECH TIME PER 15 MIN (STAT)

## 2020-12-24 PROCEDURE — 25000003 PHARM REV CODE 250: Performed by: STUDENT IN AN ORGANIZED HEALTH CARE EDUCATION/TRAINING PROGRAM

## 2020-12-24 PROCEDURE — 63600175 PHARM REV CODE 636 W HCPCS: Performed by: PHYSICIAN ASSISTANT

## 2020-12-24 PROCEDURE — 94640 AIRWAY INHALATION TREATMENT: CPT

## 2020-12-24 PROCEDURE — 94761 N-INVAS EAR/PLS OXIMETRY MLT: CPT

## 2020-12-24 PROCEDURE — 85379 FIBRIN DEGRADATION QUANT: CPT

## 2020-12-24 PROCEDURE — 27000221 HC OXYGEN, UP TO 24 HOURS

## 2020-12-24 PROCEDURE — 25000003 PHARM REV CODE 250: Performed by: PHYSICIAN ASSISTANT

## 2020-12-24 PROCEDURE — 63600175 PHARM REV CODE 636 W HCPCS: Performed by: STUDENT IN AN ORGANIZED HEALTH CARE EDUCATION/TRAINING PROGRAM

## 2020-12-24 PROCEDURE — 86140 C-REACTIVE PROTEIN: CPT

## 2020-12-24 PROCEDURE — 94660 CPAP INITIATION&MGMT: CPT

## 2020-12-24 RX ORDER — ALBUTEROL SULFATE 90 UG/1
1-2 AEROSOL, METERED RESPIRATORY (INHALATION) EVERY 6 HOURS PRN
Qty: 8 G | Refills: 1 | Status: SHIPPED | OUTPATIENT
Start: 2020-12-24 | End: 2021-02-01

## 2020-12-24 RX ORDER — ASCORBIC ACID 500 MG
500 TABLET ORAL 2 TIMES DAILY
COMMUNITY
Start: 2020-12-24 | End: 2021-01-07

## 2020-12-24 RX ORDER — ACETAMINOPHEN 325 MG/1
650 TABLET ORAL EVERY 6 HOURS PRN
Refills: 0 | COMMUNITY
Start: 2020-12-24 | End: 2021-02-01

## 2020-12-24 RX ADMIN — HYDROCHLOROTHIAZIDE 25 MG: 25 TABLET ORAL at 08:12

## 2020-12-24 RX ADMIN — ALBUTEROL SULFATE 2 PUFF: 90 AEROSOL, METERED RESPIRATORY (INHALATION) at 01:12

## 2020-12-24 RX ADMIN — THERA TABS 1 TABLET: TAB at 08:12

## 2020-12-24 RX ADMIN — CHOLECALCIFEROL (VITAMIN D3) 25 MCG (1,000 UNIT) TABLET 1000 UNITS: TABLET at 08:12

## 2020-12-24 RX ADMIN — GABAPENTIN 300 MG: 300 CAPSULE ORAL at 02:12

## 2020-12-24 RX ADMIN — INSULIN ASPART 8 UNITS: 100 INJECTION, SOLUTION INTRAVENOUS; SUBCUTANEOUS at 08:12

## 2020-12-24 RX ADMIN — ENOXAPARIN SODIUM 180 MG: 100 INJECTION SUBCUTANEOUS at 08:12

## 2020-12-24 RX ADMIN — INSULIN DETEMIR 23 UNITS: 100 INJECTION, SOLUTION SUBCUTANEOUS at 08:12

## 2020-12-24 RX ADMIN — INSULIN ASPART 8 UNITS: 100 INJECTION, SOLUTION INTRAVENOUS; SUBCUTANEOUS at 04:12

## 2020-12-24 RX ADMIN — REMDESIVIR 100 MG: 100 INJECTION, POWDER, LYOPHILIZED, FOR SOLUTION INTRAVENOUS at 04:12

## 2020-12-24 RX ADMIN — OXYCODONE HYDROCHLORIDE AND ACETAMINOPHEN 500 MG: 500 TABLET ORAL at 08:12

## 2020-12-24 RX ADMIN — INSULIN ASPART 8 UNITS: 100 INJECTION, SOLUTION INTRAVENOUS; SUBCUTANEOUS at 11:12

## 2020-12-24 RX ADMIN — GABAPENTIN 300 MG: 300 CAPSULE ORAL at 08:12

## 2020-12-24 RX ADMIN — MUPIROCIN: 20 OINTMENT TOPICAL at 08:12

## 2020-12-24 RX ADMIN — DEXAMETHASONE 6 MG: 4 TABLET ORAL at 09:12

## 2020-12-24 RX ADMIN — LOSARTAN POTASSIUM 50 MG: 50 TABLET, FILM COATED ORAL at 08:12

## 2020-12-24 RX ADMIN — ALBUTEROL SULFATE 2 PUFF: 90 AEROSOL, METERED RESPIRATORY (INHALATION) at 08:12

## 2020-12-24 RX ADMIN — AMLODIPINE BESYLATE 10 MG: 10 TABLET ORAL at 08:12

## 2020-12-25 ENCOUNTER — NURSE TRIAGE (OUTPATIENT)
Dept: ADMINISTRATIVE | Facility: CLINIC | Age: 45
End: 2020-12-25

## 2020-12-25 ENCOUNTER — PATIENT MESSAGE (OUTPATIENT)
Dept: ADMINISTRATIVE | Facility: OTHER | Age: 45
End: 2020-12-25

## 2020-12-25 NOTE — TELEPHONE ENCOUNTER
Patient initially escalated due to no response, however patient entered vitals prior to phone call. Vital signs and symptoms did not trigger a second escalation; therefore, no follow up call is needed at this time.      Reason for Disposition   Caller has cancelled the call before the first contact    Additional Information   Negative: Caller is angry or rude (e.g., hangs up, verbally abusive, yelling)   Negative: Caller hangs up   Negative: Caller has already spoken with the PCP and has no further questions.   Negative: Caller has already spoken with another triager and has no further questions.   Negative: Caller has already spoken with another triager or PCP AND has further questions AND triager able to answer questions.   Negative: Busy signal.  First attempt to contact caller.  Follow-up call scheduled within 15 minutes.   Negative: No answer.  First attempt to contact caller.  Follow-up call scheduled within 15 minutes.   Negative: Message left on identified voice mail   Negative: Message left on unidentified voice mail.  Phone number verified.   Negative: Message left with person in household.   Negative: Wrong number reached.  Phone number verified.   Negative: Second attempt to contact family AND no contact made.  Phone number verified.   Negative: Cell phone out of range.  Phone number verified.   Negative: Pager number given.  Answering service notified.   Negative: Patient already left for the hospital/clinic.   Negative: Unable to complete triage due to phone connection issues   Negative: NON-URGENT call redirected to PCP's office because it is open    Protocols used: NO CONTACT OR DUPLICATE CONTACT CALL-A-

## 2020-12-26 ENCOUNTER — PATIENT OUTREACH (OUTPATIENT)
Dept: ADMINISTRATIVE | Facility: CLINIC | Age: 45
End: 2020-12-26

## 2020-12-26 ENCOUNTER — TELEPHONE (OUTPATIENT)
Dept: ADMINISTRATIVE | Facility: CLINIC | Age: 45
End: 2020-12-26

## 2020-12-26 ENCOUNTER — PATIENT MESSAGE (OUTPATIENT)
Dept: ADMINISTRATIVE | Facility: OTHER | Age: 45
End: 2020-12-26

## 2020-12-27 ENCOUNTER — PATIENT MESSAGE (OUTPATIENT)
Dept: ADMINISTRATIVE | Facility: OTHER | Age: 45
End: 2020-12-27

## 2020-12-27 ENCOUNTER — TELEPHONE (OUTPATIENT)
Dept: ADMINISTRATIVE | Facility: CLINIC | Age: 45
End: 2020-12-27

## 2020-12-27 LAB
BACTERIA BLD CULT: NORMAL
BACTERIA BLD CULT: NORMAL

## 2020-12-28 ENCOUNTER — PATIENT MESSAGE (OUTPATIENT)
Dept: ADMINISTRATIVE | Facility: OTHER | Age: 45
End: 2020-12-28

## 2020-12-28 ENCOUNTER — PATIENT OUTREACH (OUTPATIENT)
Dept: ADMINISTRATIVE | Facility: CLINIC | Age: 45
End: 2020-12-28

## 2020-12-28 DIAGNOSIS — U07.1 PNEUMONIA DUE TO COVID-19 VIRUS: Primary | ICD-10-CM

## 2020-12-28 DIAGNOSIS — J12.82 PNEUMONIA DUE TO COVID-19 VIRUS: Primary | ICD-10-CM

## 2020-12-28 LAB — L PNEUMO AG UR QL IA: NEGATIVE

## 2020-12-28 NOTE — PATIENT INSTRUCTIONS
Pneumonia (Adult)  Pneumonia is an infection deep within the lungs. It is in the small air sacs (alveoli). Pneumonia may be caused by a virus or bacteria. Pneumonia caused by bacteria is usually treated with an antibiotic. Severe cases may need to be treated in the hospital. Milder cases can be treated at home. Symptoms usually start to get better during the first 2 days of treatment.    Home care  Follow these guidelines when caring for yourself at home:  · Rest at home for the first 2 to 3 days, or until you feel stronger. Dont let yourself get overly tired when you go back to your activities.  · Stay away from cigarette smoke - yours or other peoples.  · You may use acetaminophen or ibuprofen to control fever or pain, unless another medicine was prescribed. If you have chronic liver or kidney disease, talk with your healthcare provider before using these medicines. Also talk with your provider if youve had a stomach ulcer or gastrointestinal bleeding. Dont give aspirin to anyone younger than 18 years of age who is ill with a fever. It may cause severe liver damage.  · Your appetite may be poor, so a light diet is fine.  · Drink 6 to 8 glasses of fluids every day to make sure you are getting enough fluids. Beverages can include water, sport drinks, sodas without caffeine, juices, tea, or soup. Fluids will help loosen secretions in the lung. This will make it easier for you to cough up the phlegm (sputum). If you also have heart or kidney disease, check with your healthcare provider before you drink extra fluids.  · Take antibiotic medicine prescribed until it is all gone, even if you are feeling better after a few days.  Follow-up care  Follow up with your healthcare provider in the next 2 to 3 days, or as advised. This is to be sure the medicine is helping you get better.  If you are 65 or older, you should get a pneumococcal vaccine and a yearly flu (influenza) shot. You should also get these vaccines if  you have chronic lung disease like asthma, emphysema, or COPD. Recently, a second type of pneumonia vaccine has become available for everyone over 65 years old. This is in addition to the previous vaccine. Ask your provider about this.  When to seek medical advice  Call your healthcare provider right away if any of these occur:  · You dont get better within the first 48 hours of treatment  · Shortness of breath gets worse  · Rapid breathing (more than 25 breaths per minute)  · Coughing up blood  · Chest pain gets worse with breathing  · Fever of 100.4°F (38°C) or higher that doesnt get better with fever medicine  · Weakness, dizziness, or fainting that gets worse  · Thirst or dry mouth that gets worse  · Sinus pain, headache, or a stiff neck  · Chest pain not caused by coughing  Date Last Reviewed: 1/1/2017  © 4682-7642 [employer]. [address] [place] [address]. All rights reserved. This information is not intended as a substitute for professional medical care. Always follow your healthcare professional's instructions.

## 2020-12-29 ENCOUNTER — PATIENT MESSAGE (OUTPATIENT)
Dept: ADMINISTRATIVE | Facility: OTHER | Age: 45
End: 2020-12-29

## 2020-12-29 ENCOUNTER — TELEPHONE (OUTPATIENT)
Dept: ADMINISTRATIVE | Facility: OTHER | Age: 45
End: 2020-12-29

## 2020-12-30 ENCOUNTER — DOCUMENTATION ONLY (OUTPATIENT)
Dept: HEMATOLOGY/ONCOLOGY | Facility: CLINIC | Age: 45
End: 2020-12-30

## 2020-12-30 ENCOUNTER — PATIENT MESSAGE (OUTPATIENT)
Dept: ADMINISTRATIVE | Facility: OTHER | Age: 45
End: 2020-12-30

## 2020-12-30 ENCOUNTER — TELEPHONE (OUTPATIENT)
Dept: ADMINISTRATIVE | Facility: CLINIC | Age: 45
End: 2020-12-30

## 2020-12-30 NOTE — PROGRESS NOTES
Branden called pt to inform that Dr. Linn of out of the office until 1/4/21 thereby delaying the order for his CPAP machine.  BRANDEN messaged KANDI Currie with the order and asked her to expedite if possible.  BRANDEN remains available.

## 2020-12-31 ENCOUNTER — PATIENT MESSAGE (OUTPATIENT)
Dept: ADMINISTRATIVE | Facility: OTHER | Age: 45
End: 2020-12-31

## 2020-12-31 ENCOUNTER — NURSE TRIAGE (OUTPATIENT)
Dept: ADMINISTRATIVE | Facility: CLINIC | Age: 45
End: 2020-12-31

## 2021-01-01 ENCOUNTER — PATIENT MESSAGE (OUTPATIENT)
Dept: ADMINISTRATIVE | Facility: OTHER | Age: 46
End: 2021-01-01

## 2021-01-01 ENCOUNTER — TELEPHONE (OUTPATIENT)
Dept: ADMINISTRATIVE | Facility: CLINIC | Age: 46
End: 2021-01-01

## 2021-01-02 ENCOUNTER — PATIENT MESSAGE (OUTPATIENT)
Dept: ADMINISTRATIVE | Facility: OTHER | Age: 46
End: 2021-01-02

## 2021-01-02 ENCOUNTER — NURSE TRIAGE (OUTPATIENT)
Dept: ADMINISTRATIVE | Facility: CLINIC | Age: 46
End: 2021-01-02

## 2021-01-03 ENCOUNTER — PATIENT MESSAGE (OUTPATIENT)
Dept: ADMINISTRATIVE | Facility: OTHER | Age: 46
End: 2021-01-03

## 2021-01-03 ENCOUNTER — NURSE TRIAGE (OUTPATIENT)
Dept: ADMINISTRATIVE | Facility: CLINIC | Age: 46
End: 2021-01-03

## 2021-01-04 ENCOUNTER — NURSE TRIAGE (OUTPATIENT)
Dept: ADMINISTRATIVE | Facility: CLINIC | Age: 46
End: 2021-01-04

## 2021-01-04 ENCOUNTER — PATIENT MESSAGE (OUTPATIENT)
Dept: ADMINISTRATIVE | Facility: OTHER | Age: 46
End: 2021-01-04

## 2021-01-04 ENCOUNTER — PATIENT MESSAGE (OUTPATIENT)
Dept: ADMINISTRATIVE | Facility: CLINIC | Age: 46
End: 2021-01-04

## 2021-01-05 ENCOUNTER — TELEPHONE (OUTPATIENT)
Dept: ADMINISTRATIVE | Facility: CLINIC | Age: 46
End: 2021-01-05

## 2021-01-05 ENCOUNTER — PATIENT MESSAGE (OUTPATIENT)
Dept: ADMINISTRATIVE | Facility: OTHER | Age: 46
End: 2021-01-05

## 2021-01-06 ENCOUNTER — PATIENT MESSAGE (OUTPATIENT)
Dept: ADMINISTRATIVE | Facility: OTHER | Age: 46
End: 2021-01-06

## 2021-01-07 ENCOUNTER — CARE AT HOME (OUTPATIENT)
Dept: HOME HEALTH SERVICES | Facility: CLINIC | Age: 46
End: 2021-01-07
Payer: MEDICAID

## 2021-01-07 DIAGNOSIS — J12.82 PNEUMONIA DUE TO COVID-19 VIRUS: ICD-10-CM

## 2021-01-07 DIAGNOSIS — E11.9 TYPE 2 DIABETES MELLITUS WITHOUT COMPLICATION, UNSPECIFIED WHETHER LONG TERM INSULIN USE: Primary | ICD-10-CM

## 2021-01-07 DIAGNOSIS — I10 ESSENTIAL HYPERTENSION: ICD-10-CM

## 2021-01-07 DIAGNOSIS — U07.1 PNEUMONIA DUE TO COVID-19 VIRUS: ICD-10-CM

## 2021-01-07 PROCEDURE — 99213 PR OFFICE/OUTPT VISIT, EST, LEVL III, 20-29 MIN: ICD-10-PCS | Mod: 95,,, | Performed by: NURSE PRACTITIONER

## 2021-01-07 PROCEDURE — 99213 OFFICE O/P EST LOW 20 MIN: CPT | Mod: 95,,, | Performed by: NURSE PRACTITIONER

## 2021-01-11 ENCOUNTER — DOCUMENTATION ONLY (OUTPATIENT)
Dept: HEMATOLOGY/ONCOLOGY | Facility: CLINIC | Age: 46
End: 2021-01-11

## 2021-01-14 ENCOUNTER — PATIENT MESSAGE (OUTPATIENT)
Dept: FAMILY MEDICINE | Facility: CLINIC | Age: 46
End: 2021-01-14

## 2021-01-14 ENCOUNTER — PATIENT MESSAGE (OUTPATIENT)
Dept: HEMATOLOGY/ONCOLOGY | Facility: CLINIC | Age: 46
End: 2021-01-14

## 2021-01-14 ENCOUNTER — DOCUMENTATION ONLY (OUTPATIENT)
Dept: HEMATOLOGY/ONCOLOGY | Facility: CLINIC | Age: 46
End: 2021-01-14

## 2021-01-14 DIAGNOSIS — M79.605 PAIN OF LEFT LOWER EXTREMITY: Primary | ICD-10-CM

## 2021-01-18 ENCOUNTER — PATIENT MESSAGE (OUTPATIENT)
Dept: UROLOGY | Facility: CLINIC | Age: 46
End: 2021-01-18

## 2021-01-19 ENCOUNTER — HOSPITAL ENCOUNTER (OUTPATIENT)
Dept: RADIOLOGY | Facility: HOSPITAL | Age: 46
Discharge: HOME | End: 2021-01-19
Attending: INTERNAL MEDICINE
Payer: MEDICAID

## 2021-01-19 ENCOUNTER — PATIENT MESSAGE (OUTPATIENT)
Dept: UROLOGY | Facility: CLINIC | Age: 46
End: 2021-01-19

## 2021-01-19 ENCOUNTER — OFFICE VISIT (OUTPATIENT)
Dept: HEMATOLOGY/ONCOLOGY | Facility: CLINIC | Age: 46
End: 2021-01-19
Payer: MEDICAID

## 2021-01-19 VITALS
BODY MASS INDEX: 40.43 KG/M2 | HEIGHT: 74 IN | DIASTOLIC BLOOD PRESSURE: 67 MMHG | HEART RATE: 72 BPM | OXYGEN SATURATION: 99 % | WEIGHT: 315 LBS | TEMPERATURE: 98 F | RESPIRATION RATE: 16 BRPM | SYSTOLIC BLOOD PRESSURE: 145 MMHG

## 2021-01-19 DIAGNOSIS — C50.021 MALIGNANT NEOPLASM INVOLVING BOTH NIPPLE AND AREOLA OF RIGHT BREAST IN MALE, ESTROGEN RECEPTOR NEGATIVE: ICD-10-CM

## 2021-01-19 DIAGNOSIS — M79.605 PAIN OF LEFT LOWER EXTREMITY: ICD-10-CM

## 2021-01-19 DIAGNOSIS — R22.42 LOCALIZED SWELLING OF LEFT LOWER EXTREMITY: Primary | ICD-10-CM

## 2021-01-19 DIAGNOSIS — Z17.1 MALIGNANT NEOPLASM INVOLVING BOTH NIPPLE AND AREOLA OF RIGHT BREAST IN MALE, ESTROGEN RECEPTOR NEGATIVE: ICD-10-CM

## 2021-01-19 PROCEDURE — 99214 OFFICE O/P EST MOD 30 MIN: CPT | Mod: S$PBB,,, | Performed by: NURSE PRACTITIONER

## 2021-01-19 PROCEDURE — 99999 PR PBB SHADOW E&M-EST. PATIENT-LVL IV: ICD-10-PCS | Mod: PBBFAC,,, | Performed by: NURSE PRACTITIONER

## 2021-01-19 PROCEDURE — 99214 PR OFFICE/OUTPT VISIT, EST, LEVL IV, 30-39 MIN: ICD-10-PCS | Mod: S$PBB,,, | Performed by: NURSE PRACTITIONER

## 2021-01-19 PROCEDURE — 99214 OFFICE O/P EST MOD 30 MIN: CPT | Mod: PBBFAC,25 | Performed by: NURSE PRACTITIONER

## 2021-01-19 PROCEDURE — 93970 EXTREMITY STUDY: CPT | Mod: TC

## 2021-01-19 PROCEDURE — 93970 US LOWER EXTREMITY VEINS BILATERAL: ICD-10-PCS | Mod: 26,,, | Performed by: RADIOLOGY

## 2021-01-19 PROCEDURE — 99999 PR PBB SHADOW E&M-EST. PATIENT-LVL IV: CPT | Mod: PBBFAC,,, | Performed by: NURSE PRACTITIONER

## 2021-01-19 PROCEDURE — 93970 EXTREMITY STUDY: CPT | Mod: 26,,, | Performed by: RADIOLOGY

## 2021-01-19 RX ORDER — HYDROCHLOROTHIAZIDE 25 MG/1
TABLET ORAL
Qty: 90 TABLET | Refills: 3 | Status: CANCELLED | OUTPATIENT
Start: 2021-01-19

## 2021-01-20 ENCOUNTER — RESEARCH ENCOUNTER (OUTPATIENT)
Dept: RESEARCH | Facility: HOSPITAL | Age: 46
End: 2021-01-20

## 2021-01-21 ENCOUNTER — TELEPHONE (OUTPATIENT)
Dept: HEMATOLOGY/ONCOLOGY | Facility: CLINIC | Age: 46
End: 2021-01-21

## 2021-01-24 RX ORDER — HYDROCHLOROTHIAZIDE 25 MG/1
TABLET ORAL
Qty: 90 TABLET | Refills: 0 | Status: SHIPPED | OUTPATIENT
Start: 2021-01-24 | End: 2021-03-17 | Stop reason: SDUPTHER

## 2021-01-25 ENCOUNTER — DOCUMENTATION ONLY (OUTPATIENT)
Dept: ONCOLOGY | Facility: HOSPITAL | Age: 46
End: 2021-01-25

## 2021-01-25 ENCOUNTER — DOCUMENTATION ONLY (OUTPATIENT)
Dept: HEMATOLOGY/ONCOLOGY | Facility: CLINIC | Age: 46
End: 2021-01-25

## 2021-01-25 ENCOUNTER — TELEPHONE (OUTPATIENT)
Dept: HEMATOLOGY/ONCOLOGY | Facility: CLINIC | Age: 46
End: 2021-01-25

## 2021-01-26 ENCOUNTER — TELEPHONE (OUTPATIENT)
Dept: HEMATOLOGY/ONCOLOGY | Facility: CLINIC | Age: 46
End: 2021-01-26

## 2021-01-26 ENCOUNTER — DOCUMENTATION ONLY (OUTPATIENT)
Dept: HEMATOLOGY/ONCOLOGY | Facility: CLINIC | Age: 46
End: 2021-01-26

## 2021-01-28 ENCOUNTER — DOCUMENTATION ONLY (OUTPATIENT)
Dept: HEMATOLOGY/ONCOLOGY | Facility: CLINIC | Age: 46
End: 2021-01-28

## 2021-01-29 DIAGNOSIS — N52.8 OTHER MALE ERECTILE DYSFUNCTION: ICD-10-CM

## 2021-01-29 RX ORDER — TADALAFIL 5 MG/1
10 TABLET ORAL DAILY PRN
Qty: 20 TABLET | Refills: 1 | Status: SHIPPED | OUTPATIENT
Start: 2021-01-29 | End: 2022-05-13 | Stop reason: SDUPTHER

## 2021-02-01 ENCOUNTER — OFFICE VISIT (OUTPATIENT)
Dept: UROLOGY | Facility: CLINIC | Age: 46
End: 2021-02-01
Payer: MEDICAID

## 2021-02-01 VITALS — WEIGHT: 315 LBS | HEIGHT: 74 IN | BODY MASS INDEX: 40.43 KG/M2

## 2021-02-01 DIAGNOSIS — N43.3 HYDROCELE IN ADULT: ICD-10-CM

## 2021-02-01 PROCEDURE — 99999 PR PBB SHADOW E&M-EST. PATIENT-LVL IV: ICD-10-PCS | Mod: PBBFAC,,, | Performed by: UROLOGY

## 2021-02-01 PROCEDURE — 99214 OFFICE O/P EST MOD 30 MIN: CPT | Mod: PBBFAC,PO | Performed by: UROLOGY

## 2021-02-01 PROCEDURE — 55000 DRAINAGE OF HYDROCELE: CPT | Mod: PBBFAC,PO | Performed by: UROLOGY

## 2021-02-01 PROCEDURE — 99213 OFFICE O/P EST LOW 20 MIN: CPT | Mod: 25,S$PBB,, | Performed by: UROLOGY

## 2021-02-01 PROCEDURE — 55000 DRAINAGE OF HYDROCELE: CPT | Mod: S$PBB,RT,, | Performed by: UROLOGY

## 2021-02-01 PROCEDURE — 99999 PR PBB SHADOW E&M-EST. PATIENT-LVL IV: CPT | Mod: PBBFAC,,, | Performed by: UROLOGY

## 2021-02-01 PROCEDURE — 55000 PR DRAINAGE OF HYDROCELE,TUNICA: ICD-10-PCS | Mod: S$PBB,RT,, | Performed by: UROLOGY

## 2021-02-01 PROCEDURE — 99213 PR OFFICE/OUTPT VISIT, EST, LEVL III, 20-29 MIN: ICD-10-PCS | Mod: 25,S$PBB,, | Performed by: UROLOGY

## 2021-02-10 ENCOUNTER — PATIENT MESSAGE (OUTPATIENT)
Dept: HEMATOLOGY/ONCOLOGY | Facility: CLINIC | Age: 46
End: 2021-02-10

## 2021-02-17 ENCOUNTER — DOCUMENTATION ONLY (OUTPATIENT)
Dept: HEMATOLOGY/ONCOLOGY | Facility: CLINIC | Age: 46
End: 2021-02-17

## 2021-02-18 ENCOUNTER — TELEPHONE (OUTPATIENT)
Dept: HEMATOLOGY/ONCOLOGY | Facility: CLINIC | Age: 46
End: 2021-02-18

## 2021-02-19 ENCOUNTER — TELEPHONE (OUTPATIENT)
Dept: PHARMACY | Facility: CLINIC | Age: 46
End: 2021-02-19

## 2021-03-08 ENCOUNTER — PATIENT MESSAGE (OUTPATIENT)
Dept: HEMATOLOGY/ONCOLOGY | Facility: CLINIC | Age: 46
End: 2021-03-08

## 2021-03-17 ENCOUNTER — DOCUMENTATION ONLY (OUTPATIENT)
Dept: HEMATOLOGY/ONCOLOGY | Facility: CLINIC | Age: 46
End: 2021-03-17

## 2021-03-17 ENCOUNTER — PATIENT MESSAGE (OUTPATIENT)
Dept: FAMILY MEDICINE | Facility: CLINIC | Age: 46
End: 2021-03-17

## 2021-03-17 DIAGNOSIS — E11.65 UNCONTROLLED TYPE 2 DIABETES MELLITUS WITH HYPERGLYCEMIA: ICD-10-CM

## 2021-03-17 RX ORDER — METFORMIN HYDROCHLORIDE 500 MG/1
1000 TABLET ORAL 2 TIMES DAILY WITH MEALS
Qty: 90 TABLET | Refills: 3 | Status: SHIPPED | OUTPATIENT
Start: 2021-03-17 | End: 2021-08-19 | Stop reason: SDUPTHER

## 2021-03-18 ENCOUNTER — TELEPHONE (OUTPATIENT)
Dept: SLEEP MEDICINE | Facility: CLINIC | Age: 46
End: 2021-03-18

## 2021-03-18 ENCOUNTER — DOCUMENTATION ONLY (OUTPATIENT)
Dept: HEMATOLOGY/ONCOLOGY | Facility: CLINIC | Age: 46
End: 2021-03-18

## 2021-03-19 RX ORDER — HYDROCHLOROTHIAZIDE 25 MG/1
TABLET ORAL
Qty: 90 TABLET | Refills: 0 | Status: SHIPPED | OUTPATIENT
Start: 2021-03-19 | End: 2021-08-19 | Stop reason: SDUPTHER

## 2021-03-23 ENCOUNTER — RESEARCH ENCOUNTER (OUTPATIENT)
Dept: RESEARCH | Facility: HOSPITAL | Age: 46
End: 2021-03-23

## 2021-03-29 ENCOUNTER — PATIENT MESSAGE (OUTPATIENT)
Dept: SLEEP MEDICINE | Facility: CLINIC | Age: 46
End: 2021-03-29

## 2021-03-29 ENCOUNTER — DOCUMENTATION ONLY (OUTPATIENT)
Dept: HEMATOLOGY/ONCOLOGY | Facility: CLINIC | Age: 46
End: 2021-03-29

## 2021-03-29 ENCOUNTER — OFFICE VISIT (OUTPATIENT)
Dept: SLEEP MEDICINE | Facility: CLINIC | Age: 46
End: 2021-03-29
Payer: MEDICAID

## 2021-03-29 VITALS
WEIGHT: 315 LBS | HEIGHT: 74 IN | HEART RATE: 79 BPM | BODY MASS INDEX: 40.43 KG/M2 | DIASTOLIC BLOOD PRESSURE: 55 MMHG | SYSTOLIC BLOOD PRESSURE: 103 MMHG

## 2021-03-29 DIAGNOSIS — I10 ESSENTIAL HYPERTENSION: ICD-10-CM

## 2021-03-29 DIAGNOSIS — N43.3 HYDROCELE, UNSPECIFIED HYDROCELE TYPE: ICD-10-CM

## 2021-03-29 DIAGNOSIS — E66.01 MORBID OBESITY: ICD-10-CM

## 2021-03-29 DIAGNOSIS — E66.01 CLASS 3 SEVERE OBESITY DUE TO EXCESS CALORIES WITH SERIOUS COMORBIDITY AND BODY MASS INDEX (BMI) OF 50.0 TO 59.9 IN ADULT: ICD-10-CM

## 2021-03-29 DIAGNOSIS — G47.33 OSA (OBSTRUCTIVE SLEEP APNEA): Primary | ICD-10-CM

## 2021-03-29 PROCEDURE — 99999 PR PBB SHADOW E&M-EST. PATIENT-LVL III: ICD-10-PCS | Mod: PBBFAC,,, | Performed by: INTERNAL MEDICINE

## 2021-03-29 PROCEDURE — 99999 PR PBB SHADOW E&M-EST. PATIENT-LVL III: CPT | Mod: PBBFAC,,, | Performed by: INTERNAL MEDICINE

## 2021-03-29 PROCEDURE — 99213 OFFICE O/P EST LOW 20 MIN: CPT | Mod: PBBFAC | Performed by: INTERNAL MEDICINE

## 2021-03-29 PROCEDURE — 99203 PR OFFICE/OUTPT VISIT, NEW, LEVL III, 30-44 MIN: ICD-10-PCS | Mod: S$PBB,,, | Performed by: INTERNAL MEDICINE

## 2021-03-29 PROCEDURE — 99203 OFFICE O/P NEW LOW 30 MIN: CPT | Mod: S$PBB,,, | Performed by: INTERNAL MEDICINE

## 2021-03-30 ENCOUNTER — TELEPHONE (OUTPATIENT)
Dept: HEMATOLOGY/ONCOLOGY | Facility: CLINIC | Age: 46
End: 2021-03-30

## 2021-03-30 DIAGNOSIS — G47.33 OSA (OBSTRUCTIVE SLEEP APNEA): Primary | ICD-10-CM

## 2021-04-01 ENCOUNTER — PATIENT OUTREACH (OUTPATIENT)
Dept: ADMINISTRATIVE | Facility: HOSPITAL | Age: 46
End: 2021-04-01

## 2021-04-02 ENCOUNTER — PATIENT MESSAGE (OUTPATIENT)
Dept: SLEEP MEDICINE | Facility: CLINIC | Age: 46
End: 2021-04-02

## 2021-04-15 ENCOUNTER — PATIENT MESSAGE (OUTPATIENT)
Dept: HEMATOLOGY/ONCOLOGY | Facility: CLINIC | Age: 46
End: 2021-04-15

## 2021-04-15 ENCOUNTER — OFFICE VISIT (OUTPATIENT)
Dept: FAMILY MEDICINE | Facility: CLINIC | Age: 46
End: 2021-04-15
Payer: MEDICAID

## 2021-04-15 VITALS
DIASTOLIC BLOOD PRESSURE: 70 MMHG | SYSTOLIC BLOOD PRESSURE: 134 MMHG | HEIGHT: 74 IN | RESPIRATION RATE: 18 BRPM | TEMPERATURE: 99 F | WEIGHT: 315 LBS | HEART RATE: 74 BPM | BODY MASS INDEX: 40.43 KG/M2

## 2021-04-15 DIAGNOSIS — I10 ESSENTIAL HYPERTENSION: ICD-10-CM

## 2021-04-15 DIAGNOSIS — L84 CORN OF TOE: ICD-10-CM

## 2021-04-15 DIAGNOSIS — E11.9 TYPE 2 DIABETES MELLITUS WITHOUT COMPLICATION, WITHOUT LONG-TERM CURRENT USE OF INSULIN: ICD-10-CM

## 2021-04-15 DIAGNOSIS — Z00.00 PREVENTATIVE HEALTH CARE: Primary | ICD-10-CM

## 2021-04-15 PROBLEM — K59.03 DRUG INDUCED CONSTIPATION: Status: RESOLVED | Noted: 2019-06-10 | Resolved: 2021-04-15

## 2021-04-15 PROBLEM — U07.1 PNEUMONIA DUE TO COVID-19 VIRUS: Status: RESOLVED | Noted: 2020-12-22 | Resolved: 2021-04-15

## 2021-04-15 PROBLEM — N43.3 HYDROCELE: Status: RESOLVED | Noted: 2019-05-24 | Resolved: 2021-04-15

## 2021-04-15 PROBLEM — J12.82 PNEUMONIA DUE TO COVID-19 VIRUS: Status: RESOLVED | Noted: 2020-12-22 | Resolved: 2021-04-15

## 2021-04-15 PROCEDURE — 99999 PR PBB SHADOW E&M-EST. PATIENT-LVL IV: CPT | Mod: PBBFAC,,, | Performed by: FAMILY MEDICINE

## 2021-04-15 PROCEDURE — 99396 PREV VISIT EST AGE 40-64: CPT | Mod: S$PBB,,, | Performed by: FAMILY MEDICINE

## 2021-04-15 PROCEDURE — 99396 PR PREVENTIVE VISIT,EST,40-64: ICD-10-PCS | Mod: S$PBB,,, | Performed by: FAMILY MEDICINE

## 2021-04-15 PROCEDURE — 99214 OFFICE O/P EST MOD 30 MIN: CPT | Mod: PBBFAC,PO | Performed by: FAMILY MEDICINE

## 2021-04-15 PROCEDURE — 99999 PR PBB SHADOW E&M-EST. PATIENT-LVL IV: ICD-10-PCS | Mod: PBBFAC,,, | Performed by: FAMILY MEDICINE

## 2021-04-15 RX ORDER — LANCETS 33 GAUGE
1 EACH MISCELLANEOUS DAILY
Qty: 100 EACH | Refills: 3 | Status: SHIPPED | OUTPATIENT
Start: 2021-04-15 | End: 2022-05-11 | Stop reason: SDUPTHER

## 2021-04-16 ENCOUNTER — DOCUMENTATION ONLY (OUTPATIENT)
Dept: HEMATOLOGY/ONCOLOGY | Facility: CLINIC | Age: 46
End: 2021-04-16

## 2021-05-06 ENCOUNTER — PATIENT MESSAGE (OUTPATIENT)
Dept: FAMILY MEDICINE | Facility: CLINIC | Age: 46
End: 2021-05-06

## 2021-05-09 ENCOUNTER — PATIENT MESSAGE (OUTPATIENT)
Dept: FAMILY MEDICINE | Facility: CLINIC | Age: 46
End: 2021-05-09

## 2021-05-10 ENCOUNTER — LAB VISIT (OUTPATIENT)
Dept: LAB | Facility: HOSPITAL | Age: 46
End: 2021-05-10
Attending: NURSE PRACTITIONER
Payer: MEDICAID

## 2021-05-10 DIAGNOSIS — E11.65 UNCONTROLLED TYPE 2 DIABETES MELLITUS WITH HYPERGLYCEMIA: ICD-10-CM

## 2021-05-10 DIAGNOSIS — E11.9 TYPE 2 DIABETES MELLITUS WITHOUT COMPLICATION: ICD-10-CM

## 2021-05-10 DIAGNOSIS — T45.1X5A LEUKOPENIA DUE TO ANTINEOPLASTIC CHEMOTHERAPY: ICD-10-CM

## 2021-05-10 DIAGNOSIS — D70.1 LEUKOPENIA DUE TO ANTINEOPLASTIC CHEMOTHERAPY: ICD-10-CM

## 2021-05-10 LAB
ALBUMIN SERPL BCP-MCNC: 3.8 G/DL (ref 3.5–5.2)
ALP SERPL-CCNC: 133 U/L (ref 55–135)
ALT SERPL W/O P-5'-P-CCNC: 21 U/L (ref 10–44)
ANION GAP SERPL CALC-SCNC: 12 MMOL/L (ref 8–16)
AST SERPL-CCNC: 20 U/L (ref 10–40)
BILIRUB SERPL-MCNC: 0.6 MG/DL (ref 0.1–1)
BUN SERPL-MCNC: 18 MG/DL (ref 6–20)
CALCIUM SERPL-MCNC: 9.8 MG/DL (ref 8.7–10.5)
CHLORIDE SERPL-SCNC: 106 MMOL/L (ref 95–110)
CO2 SERPL-SCNC: 23 MMOL/L (ref 23–29)
CREAT SERPL-MCNC: 1 MG/DL (ref 0.5–1.4)
EST. GFR  (AFRICAN AMERICAN): >60 ML/MIN/1.73 M^2
EST. GFR  (NON AFRICAN AMERICAN): >60 ML/MIN/1.73 M^2
ESTIMATED AVG GLUCOSE: 111 MG/DL (ref 68–131)
GLUCOSE SERPL-MCNC: 77 MG/DL (ref 70–110)
HBA1C MFR BLD: 5.5 % (ref 4–5.6)
POTASSIUM SERPL-SCNC: 3.9 MMOL/L (ref 3.5–5.1)
PROT SERPL-MCNC: 7.8 G/DL (ref 6–8.4)
SODIUM SERPL-SCNC: 141 MMOL/L (ref 136–145)
VIT B12 SERPL-MCNC: 213 PG/ML (ref 210–950)

## 2021-05-10 PROCEDURE — 36415 COLL VENOUS BLD VENIPUNCTURE: CPT | Performed by: NURSE PRACTITIONER

## 2021-05-10 PROCEDURE — 82607 VITAMIN B-12: CPT | Performed by: NURSE PRACTITIONER

## 2021-05-10 PROCEDURE — 80053 COMPREHEN METABOLIC PANEL: CPT | Performed by: NURSE PRACTITIONER

## 2021-05-10 PROCEDURE — 83036 HEMOGLOBIN GLYCOSYLATED A1C: CPT | Performed by: NURSE PRACTITIONER

## 2021-05-11 ENCOUNTER — PATIENT MESSAGE (OUTPATIENT)
Dept: ENDOCRINOLOGY | Facility: CLINIC | Age: 46
End: 2021-05-11

## 2021-05-12 ENCOUNTER — PATIENT MESSAGE (OUTPATIENT)
Dept: ENDOCRINOLOGY | Facility: CLINIC | Age: 46
End: 2021-05-12

## 2021-05-24 ENCOUNTER — PATIENT MESSAGE (OUTPATIENT)
Dept: HEMATOLOGY/ONCOLOGY | Facility: CLINIC | Age: 46
End: 2021-05-24

## 2021-05-26 ENCOUNTER — PATIENT MESSAGE (OUTPATIENT)
Dept: HEMATOLOGY/ONCOLOGY | Facility: CLINIC | Age: 46
End: 2021-05-26

## 2021-05-27 ENCOUNTER — TELEPHONE (OUTPATIENT)
Dept: HEMATOLOGY/ONCOLOGY | Facility: CLINIC | Age: 46
End: 2021-05-27

## 2021-05-27 DIAGNOSIS — C50.021 MALIGNANT NEOPLASM INVOLVING BOTH NIPPLE AND AREOLA OF RIGHT BREAST IN MALE, ESTROGEN RECEPTOR NEGATIVE: ICD-10-CM

## 2021-05-27 DIAGNOSIS — M54.40 ACUTE RIGHT-SIDED LOW BACK PAIN WITH SCIATICA, SCIATICA LATERALITY UNSPECIFIED: Primary | ICD-10-CM

## 2021-05-27 DIAGNOSIS — Z17.1 MALIGNANT NEOPLASM INVOLVING BOTH NIPPLE AND AREOLA OF RIGHT BREAST IN MALE, ESTROGEN RECEPTOR NEGATIVE: ICD-10-CM

## 2021-05-29 ENCOUNTER — PATIENT OUTREACH (OUTPATIENT)
Dept: ADMINISTRATIVE | Facility: OTHER | Age: 46
End: 2021-05-29

## 2021-06-03 ENCOUNTER — HOSPITAL ENCOUNTER (OUTPATIENT)
Dept: RADIOLOGY | Facility: HOSPITAL | Age: 46
Discharge: HOME | End: 2021-06-03
Attending: INTERNAL MEDICINE
Payer: MEDICAID

## 2021-06-03 DIAGNOSIS — M54.40 ACUTE RIGHT-SIDED LOW BACK PAIN WITH SCIATICA, SCIATICA LATERALITY UNSPECIFIED: ICD-10-CM

## 2021-06-03 DIAGNOSIS — Z17.1 MALIGNANT NEOPLASM INVOLVING BOTH NIPPLE AND AREOLA OF RIGHT BREAST IN MALE, ESTROGEN RECEPTOR NEGATIVE: ICD-10-CM

## 2021-06-03 DIAGNOSIS — C50.021 MALIGNANT NEOPLASM INVOLVING BOTH NIPPLE AND AREOLA OF RIGHT BREAST IN MALE, ESTROGEN RECEPTOR NEGATIVE: ICD-10-CM

## 2021-06-03 PROCEDURE — A9585 GADOBUTROL INJECTION: HCPCS | Performed by: INTERNAL MEDICINE

## 2021-06-03 PROCEDURE — 72157 MRI CHEST SPINE W/O & W/DYE: CPT | Mod: 26,,, | Performed by: RADIOLOGY

## 2021-06-03 PROCEDURE — 72158 MRI LUMBAR SPINE W/O & W/DYE: CPT | Mod: TC

## 2021-06-03 PROCEDURE — 72158 MRI LUMBAR SPINE W/O & W/DYE: CPT | Mod: 26,,, | Performed by: RADIOLOGY

## 2021-06-03 PROCEDURE — 72157 MRI CHEST SPINE W/O & W/DYE: CPT | Mod: TC

## 2021-06-03 PROCEDURE — 72158 MRI LUMBAR SPINE W WO CONTRAST: ICD-10-PCS | Mod: 26,,, | Performed by: RADIOLOGY

## 2021-06-03 PROCEDURE — 72157 MRI THORACIC SPINE W WO CONTRAST: ICD-10-PCS | Mod: 26,,, | Performed by: RADIOLOGY

## 2021-06-03 PROCEDURE — 25500020 PHARM REV CODE 255: Performed by: INTERNAL MEDICINE

## 2021-06-03 RX ORDER — GADOBUTROL 604.72 MG/ML
10 INJECTION INTRAVENOUS
Status: COMPLETED | OUTPATIENT
Start: 2021-06-03 | End: 2021-06-03

## 2021-06-03 RX ADMIN — GADOBUTROL 10 ML: 604.72 INJECTION INTRAVENOUS at 07:06

## 2021-06-04 ENCOUNTER — PATIENT MESSAGE (OUTPATIENT)
Dept: HEMATOLOGY/ONCOLOGY | Facility: CLINIC | Age: 46
End: 2021-06-04

## 2021-06-04 DIAGNOSIS — C50.021 MALIGNANT NEOPLASM INVOLVING BOTH NIPPLE AND AREOLA OF RIGHT BREAST IN MALE, ESTROGEN RECEPTOR NEGATIVE: Primary | ICD-10-CM

## 2021-06-04 DIAGNOSIS — Z17.1 MALIGNANT NEOPLASM INVOLVING BOTH NIPPLE AND AREOLA OF RIGHT BREAST IN MALE, ESTROGEN RECEPTOR NEGATIVE: Primary | ICD-10-CM

## 2021-06-04 RX ORDER — HYDROCODONE BITARTRATE AND ACETAMINOPHEN 5; 325 MG/1; MG/1
1 TABLET ORAL EVERY 6 HOURS PRN
Qty: 30 TABLET | Refills: 0 | Status: SHIPPED | OUTPATIENT
Start: 2021-06-04 | End: 2021-06-18 | Stop reason: SDUPTHER

## 2021-06-07 ENCOUNTER — LAB VISIT (OUTPATIENT)
Dept: LAB | Facility: HOSPITAL | Age: 46
End: 2021-06-07
Attending: INTERNAL MEDICINE
Payer: MEDICAID

## 2021-06-07 ENCOUNTER — TELEPHONE (OUTPATIENT)
Dept: HEMATOLOGY/ONCOLOGY | Facility: CLINIC | Age: 46
End: 2021-06-07

## 2021-06-07 DIAGNOSIS — C50.021 MALIGNANT NEOPLASM INVOLVING BOTH NIPPLE AND AREOLA OF RIGHT BREAST IN MALE, ESTROGEN RECEPTOR NEGATIVE: ICD-10-CM

## 2021-06-07 DIAGNOSIS — Z17.1 MALIGNANT NEOPLASM INVOLVING BOTH NIPPLE AND AREOLA OF RIGHT BREAST IN MALE, ESTROGEN RECEPTOR NEGATIVE: ICD-10-CM

## 2021-06-07 PROCEDURE — 86300 IMMUNOASSAY TUMOR CA 15-3: CPT | Performed by: INTERNAL MEDICINE

## 2021-06-07 PROCEDURE — 36415 COLL VENOUS BLD VENIPUNCTURE: CPT | Performed by: INTERNAL MEDICINE

## 2021-06-07 PROCEDURE — 86300 IMMUNOASSAY TUMOR CA 15-3: CPT | Mod: 91 | Performed by: INTERNAL MEDICINE

## 2021-06-08 LAB — CANCER AG15-3 SERPL IA-ACNC: 14 U/ML

## 2021-06-09 ENCOUNTER — HOSPITAL ENCOUNTER (OUTPATIENT)
Dept: RADIOLOGY | Facility: HOSPITAL | Age: 46
Discharge: HOME | End: 2021-06-09
Attending: INTERNAL MEDICINE
Payer: MEDICAID

## 2021-06-09 ENCOUNTER — PATIENT MESSAGE (OUTPATIENT)
Dept: HEMATOLOGY/ONCOLOGY | Facility: CLINIC | Age: 46
End: 2021-06-09

## 2021-06-09 DIAGNOSIS — Z17.1 MALIGNANT NEOPLASM INVOLVING BOTH NIPPLE AND AREOLA OF RIGHT BREAST IN MALE, ESTROGEN RECEPTOR NEGATIVE: ICD-10-CM

## 2021-06-09 DIAGNOSIS — C50.021 MALIGNANT NEOPLASM INVOLVING BOTH NIPPLE AND AREOLA OF RIGHT BREAST IN MALE, ESTROGEN RECEPTOR NEGATIVE: ICD-10-CM

## 2021-06-09 LAB
CANCER AG27-29 SERPL-ACNC: 15.9 U/ML
POCT GLUCOSE: 97 MG/DL (ref 70–110)

## 2021-06-09 PROCEDURE — 78815 NM PET CT ROUTINE: ICD-10-PCS | Mod: 26,PS,, | Performed by: RADIOLOGY

## 2021-06-09 PROCEDURE — 25500020 PHARM REV CODE 255: Performed by: INTERNAL MEDICINE

## 2021-06-09 PROCEDURE — A9698 NON-RAD CONTRAST MATERIALNOC: HCPCS | Performed by: INTERNAL MEDICINE

## 2021-06-09 PROCEDURE — 78815 PET IMAGE W/CT SKULL-THIGH: CPT | Mod: TC

## 2021-06-09 PROCEDURE — 78815 PET IMAGE W/CT SKULL-THIGH: CPT | Mod: 26,PS,, | Performed by: RADIOLOGY

## 2021-06-09 RX ADMIN — IOHEXOL 1000 ML: 9 SOLUTION ORAL at 08:06

## 2021-06-10 ENCOUNTER — TELEPHONE (OUTPATIENT)
Dept: HEMATOLOGY/ONCOLOGY | Facility: CLINIC | Age: 46
End: 2021-06-10

## 2021-06-10 ENCOUNTER — PATIENT MESSAGE (OUTPATIENT)
Dept: HEMATOLOGY/ONCOLOGY | Facility: CLINIC | Age: 46
End: 2021-06-10

## 2021-06-10 ENCOUNTER — OFFICE VISIT (OUTPATIENT)
Dept: HEMATOLOGY/ONCOLOGY | Facility: CLINIC | Age: 46
End: 2021-06-10
Payer: MEDICAID

## 2021-06-10 VITALS
SYSTOLIC BLOOD PRESSURE: 150 MMHG | WEIGHT: 315 LBS | OXYGEN SATURATION: 99 % | TEMPERATURE: 97 F | DIASTOLIC BLOOD PRESSURE: 74 MMHG | BODY MASS INDEX: 39.17 KG/M2 | HEART RATE: 72 BPM | HEIGHT: 75 IN | RESPIRATION RATE: 18 BRPM

## 2021-06-10 DIAGNOSIS — F41.1 ANXIETY ASSOCIATED WITH CANCER DIAGNOSIS: Primary | ICD-10-CM

## 2021-06-10 DIAGNOSIS — C79.51 BONE METASTASES: ICD-10-CM

## 2021-06-10 DIAGNOSIS — C80.1 ANXIETY ASSOCIATED WITH CANCER DIAGNOSIS: Primary | ICD-10-CM

## 2021-06-10 DIAGNOSIS — C50.021 MALIGNANT NEOPLASM INVOLVING BOTH NIPPLE AND AREOLA OF RIGHT BREAST IN MALE, ESTROGEN RECEPTOR NEGATIVE: ICD-10-CM

## 2021-06-10 DIAGNOSIS — Z17.1 MALIGNANT NEOPLASM INVOLVING BOTH NIPPLE AND AREOLA OF RIGHT BREAST IN MALE, ESTROGEN RECEPTOR NEGATIVE: ICD-10-CM

## 2021-06-10 PROCEDURE — 99214 OFFICE O/P EST MOD 30 MIN: CPT | Mod: S$PBB,,, | Performed by: INTERNAL MEDICINE

## 2021-06-10 PROCEDURE — 99215 OFFICE O/P EST HI 40 MIN: CPT | Mod: PBBFAC | Performed by: INTERNAL MEDICINE

## 2021-06-10 PROCEDURE — 99999 PR PBB SHADOW E&M-EST. PATIENT-LVL V: ICD-10-PCS | Mod: PBBFAC,,, | Performed by: INTERNAL MEDICINE

## 2021-06-10 PROCEDURE — 99999 PR PBB SHADOW E&M-EST. PATIENT-LVL V: CPT | Mod: PBBFAC,,, | Performed by: INTERNAL MEDICINE

## 2021-06-10 PROCEDURE — 99214 PR OFFICE/OUTPT VISIT, EST, LEVL IV, 30-39 MIN: ICD-10-PCS | Mod: S$PBB,,, | Performed by: INTERNAL MEDICINE

## 2021-06-10 RX ORDER — FERROUS SULFATE, DRIED 160(50) MG
1 TABLET, EXTENDED RELEASE ORAL 2 TIMES DAILY
Qty: 180 TABLET | Refills: 3 | Status: SHIPPED | OUTPATIENT
Start: 2021-06-10 | End: 2022-08-30 | Stop reason: SDUPTHER

## 2021-06-10 RX ORDER — ALPRAZOLAM 0.5 MG/1
0.5 TABLET ORAL 3 TIMES DAILY PRN
Qty: 60 TABLET | Refills: 0 | Status: SHIPPED | OUTPATIENT
Start: 2021-06-10 | End: 2021-07-07 | Stop reason: SDUPTHER

## 2021-06-11 ENCOUNTER — PATIENT MESSAGE (OUTPATIENT)
Dept: HEMATOLOGY/ONCOLOGY | Facility: CLINIC | Age: 46
End: 2021-06-11

## 2021-06-14 ENCOUNTER — PATIENT MESSAGE (OUTPATIENT)
Dept: HEMATOLOGY/ONCOLOGY | Facility: CLINIC | Age: 46
End: 2021-06-14

## 2021-06-17 ENCOUNTER — TELEPHONE (OUTPATIENT)
Dept: HEMATOLOGY/ONCOLOGY | Facility: CLINIC | Age: 46
End: 2021-06-17

## 2021-06-17 ENCOUNTER — PATIENT MESSAGE (OUTPATIENT)
Dept: HEMATOLOGY/ONCOLOGY | Facility: CLINIC | Age: 46
End: 2021-06-17

## 2021-06-17 ENCOUNTER — TELEPHONE (OUTPATIENT)
Dept: INTERVENTIONAL RADIOLOGY/VASCULAR | Facility: CLINIC | Age: 46
End: 2021-06-17

## 2021-06-17 DIAGNOSIS — C79.51 BONE METASTASES: Primary | ICD-10-CM

## 2021-06-17 DIAGNOSIS — C50.021 MALIGNANT NEOPLASM INVOLVING BOTH NIPPLE AND AREOLA OF RIGHT BREAST IN MALE, ESTROGEN RECEPTOR NEGATIVE: Primary | ICD-10-CM

## 2021-06-17 DIAGNOSIS — Z17.1 MALIGNANT NEOPLASM INVOLVING BOTH NIPPLE AND AREOLA OF RIGHT BREAST IN MALE, ESTROGEN RECEPTOR NEGATIVE: Primary | ICD-10-CM

## 2021-06-18 ENCOUNTER — PATIENT MESSAGE (OUTPATIENT)
Dept: HEMATOLOGY/ONCOLOGY | Facility: CLINIC | Age: 46
End: 2021-06-18

## 2021-06-18 ENCOUNTER — LAB VISIT (OUTPATIENT)
Dept: LAB | Facility: HOSPITAL | Age: 46
End: 2021-06-18
Attending: INTERNAL MEDICINE
Payer: MEDICAID

## 2021-06-18 DIAGNOSIS — Z17.1 MALIGNANT NEOPLASM INVOLVING BOTH NIPPLE AND AREOLA OF RIGHT BREAST IN MALE, ESTROGEN RECEPTOR NEGATIVE: Primary | ICD-10-CM

## 2021-06-18 DIAGNOSIS — C79.51 BONE METASTASES: ICD-10-CM

## 2021-06-18 DIAGNOSIS — C50.021 MALIGNANT NEOPLASM INVOLVING BOTH NIPPLE AND AREOLA OF RIGHT BREAST IN MALE, ESTROGEN RECEPTOR NEGATIVE: ICD-10-CM

## 2021-06-18 DIAGNOSIS — C50.021 MALIGNANT NEOPLASM INVOLVING BOTH NIPPLE AND AREOLA OF RIGHT BREAST IN MALE, ESTROGEN RECEPTOR NEGATIVE: Primary | ICD-10-CM

## 2021-06-18 DIAGNOSIS — Z17.1 MALIGNANT NEOPLASM INVOLVING BOTH NIPPLE AND AREOLA OF RIGHT BREAST IN MALE, ESTROGEN RECEPTOR NEGATIVE: ICD-10-CM

## 2021-06-18 LAB
ALBUMIN SERPL BCP-MCNC: 3.8 G/DL (ref 3.5–5.2)
ALP SERPL-CCNC: 170 U/L (ref 55–135)
ALT SERPL W/O P-5'-P-CCNC: 23 U/L (ref 10–44)
ANION GAP SERPL CALC-SCNC: 9 MMOL/L (ref 8–16)
AST SERPL-CCNC: 21 U/L (ref 10–40)
BILIRUB SERPL-MCNC: 0.7 MG/DL (ref 0.1–1)
BUN SERPL-MCNC: 16 MG/DL (ref 6–20)
CALCIUM SERPL-MCNC: 10.2 MG/DL (ref 8.7–10.5)
CHLORIDE SERPL-SCNC: 103 MMOL/L (ref 95–110)
CO2 SERPL-SCNC: 29 MMOL/L (ref 23–29)
CREAT SERPL-MCNC: 1.1 MG/DL (ref 0.5–1.4)
EST. GFR  (AFRICAN AMERICAN): >60 ML/MIN/1.73 M^2
EST. GFR  (NON AFRICAN AMERICAN): >60 ML/MIN/1.73 M^2
GLUCOSE SERPL-MCNC: 85 MG/DL (ref 70–110)
POTASSIUM SERPL-SCNC: 4.3 MMOL/L (ref 3.5–5.1)
PROT SERPL-MCNC: 8 G/DL (ref 6–8.4)
SODIUM SERPL-SCNC: 141 MMOL/L (ref 136–145)

## 2021-06-18 PROCEDURE — 36415 COLL VENOUS BLD VENIPUNCTURE: CPT | Performed by: INTERNAL MEDICINE

## 2021-06-18 PROCEDURE — 80053 COMPREHEN METABOLIC PANEL: CPT | Performed by: INTERNAL MEDICINE

## 2021-06-18 RX ORDER — SODIUM CHLORIDE 0.9 % (FLUSH) 0.9 %
10 SYRINGE (ML) INJECTION
Status: CANCELLED | OUTPATIENT
Start: 2021-06-18

## 2021-06-18 RX ORDER — HYDROCODONE BITARTRATE AND ACETAMINOPHEN 5; 325 MG/1; MG/1
1 TABLET ORAL EVERY 6 HOURS PRN
Qty: 30 TABLET | Refills: 0 | Status: SHIPPED | OUTPATIENT
Start: 2021-06-18 | End: 2021-06-29 | Stop reason: SDUPTHER

## 2021-06-18 RX ORDER — HEPARIN 100 UNIT/ML
500 SYRINGE INTRAVENOUS
Status: CANCELLED | OUTPATIENT
Start: 2021-06-18

## 2021-06-25 RX ORDER — FENTANYL CITRATE 50 UG/ML
50 INJECTION, SOLUTION INTRAMUSCULAR; INTRAVENOUS
Status: CANCELLED | OUTPATIENT
Start: 2021-06-25

## 2021-06-25 RX ORDER — MIDAZOLAM HYDROCHLORIDE 1 MG/ML
1 INJECTION INTRAMUSCULAR; INTRAVENOUS
Status: CANCELLED | OUTPATIENT
Start: 2021-06-25

## 2021-06-28 LAB — GUARDANT 360: NORMAL

## 2021-06-29 ENCOUNTER — DOCUMENTATION ONLY (OUTPATIENT)
Dept: HEMATOLOGY/ONCOLOGY | Facility: CLINIC | Age: 46
End: 2021-06-29

## 2021-06-29 DIAGNOSIS — Z17.1 MALIGNANT NEOPLASM INVOLVING BOTH NIPPLE AND AREOLA OF RIGHT BREAST IN MALE, ESTROGEN RECEPTOR NEGATIVE: ICD-10-CM

## 2021-06-29 DIAGNOSIS — C50.021 MALIGNANT NEOPLASM INVOLVING BOTH NIPPLE AND AREOLA OF RIGHT BREAST IN MALE, ESTROGEN RECEPTOR NEGATIVE: ICD-10-CM

## 2021-06-29 RX ORDER — HYDROCODONE BITARTRATE AND ACETAMINOPHEN 5; 325 MG/1; MG/1
1 TABLET ORAL EVERY 6 HOURS PRN
Qty: 30 TABLET | Refills: 0 | Status: SHIPPED | OUTPATIENT
Start: 2021-06-29 | End: 2021-07-07 | Stop reason: SDUPTHER

## 2021-07-01 ENCOUNTER — TELEPHONE (OUTPATIENT)
Dept: INTERVENTIONAL RADIOLOGY/VASCULAR | Facility: HOSPITAL | Age: 46
End: 2021-07-01

## 2021-07-01 ENCOUNTER — LAB VISIT (OUTPATIENT)
Dept: LAB | Facility: HOSPITAL | Age: 46
End: 2021-07-01
Payer: MEDICAID

## 2021-07-01 DIAGNOSIS — C79.51 BONE METASTASES: ICD-10-CM

## 2021-07-01 LAB
BASOPHILS # BLD AUTO: 0.02 K/UL (ref 0–0.2)
BASOPHILS NFR BLD: 0.5 % (ref 0–1.9)
DIFFERENTIAL METHOD: ABNORMAL
EOSINOPHIL # BLD AUTO: 0 K/UL (ref 0–0.5)
EOSINOPHIL NFR BLD: 0.9 % (ref 0–8)
ERYTHROCYTE [DISTWIDTH] IN BLOOD BY AUTOMATED COUNT: 15.7 % (ref 11.5–14.5)
HCT VFR BLD AUTO: 38.1 % (ref 40–54)
HGB BLD-MCNC: 12.1 G/DL (ref 14–18)
IMM GRANULOCYTES # BLD AUTO: 0 K/UL (ref 0–0.04)
IMM GRANULOCYTES NFR BLD AUTO: 0 % (ref 0–0.5)
INR PPP: 1 (ref 0.8–1.2)
LYMPHOCYTES # BLD AUTO: 1.4 K/UL (ref 1–4.8)
LYMPHOCYTES NFR BLD: 30.5 % (ref 18–48)
MCH RBC QN AUTO: 24.5 PG (ref 27–31)
MCHC RBC AUTO-ENTMCNC: 31.8 G/DL (ref 32–36)
MCV RBC AUTO: 77 FL (ref 82–98)
MONOCYTES # BLD AUTO: 0.4 K/UL (ref 0.3–1)
MONOCYTES NFR BLD: 8.1 % (ref 4–15)
NEUTROPHILS # BLD AUTO: 2.7 K/UL (ref 1.8–7.7)
NEUTROPHILS NFR BLD: 60 % (ref 38–73)
NRBC BLD-RTO: 0 /100 WBC
PLATELET # BLD AUTO: 197 K/UL (ref 150–450)
PMV BLD AUTO: 10.5 FL (ref 9.2–12.9)
PROTHROMBIN TIME: 10.5 SEC (ref 9–12.5)
RBC # BLD AUTO: 4.94 M/UL (ref 4.6–6.2)
WBC # BLD AUTO: 4.42 K/UL (ref 3.9–12.7)

## 2021-07-01 PROCEDURE — 85610 PROTHROMBIN TIME: CPT | Performed by: FAMILY MEDICINE

## 2021-07-01 PROCEDURE — 85025 COMPLETE CBC W/AUTO DIFF WBC: CPT | Performed by: FAMILY MEDICINE

## 2021-07-01 PROCEDURE — 36415 COLL VENOUS BLD VENIPUNCTURE: CPT | Performed by: FAMILY MEDICINE

## 2021-07-02 ENCOUNTER — PATIENT MESSAGE (OUTPATIENT)
Dept: HEMATOLOGY/ONCOLOGY | Facility: CLINIC | Age: 46
End: 2021-07-02

## 2021-07-02 ENCOUNTER — HOSPITAL ENCOUNTER (OUTPATIENT)
Dept: INTERVENTIONAL RADIOLOGY/VASCULAR | Facility: HOSPITAL | Age: 46
Discharge: HOME | End: 2021-07-02
Attending: FAMILY MEDICINE
Payer: MEDICAID

## 2021-07-02 VITALS
BODY MASS INDEX: 40.43 KG/M2 | DIASTOLIC BLOOD PRESSURE: 65 MMHG | HEART RATE: 63 BPM | TEMPERATURE: 97 F | HEIGHT: 74 IN | RESPIRATION RATE: 19 BRPM | SYSTOLIC BLOOD PRESSURE: 130 MMHG | OXYGEN SATURATION: 98 % | WEIGHT: 315 LBS

## 2021-07-02 DIAGNOSIS — C79.51 BONE METASTASES: ICD-10-CM

## 2021-07-02 PROCEDURE — 88341 IMHCHEM/IMCYTCHM EA ADD ANTB: CPT | Mod: 26,,, | Performed by: PATHOLOGY

## 2021-07-02 PROCEDURE — 88311 DECALCIFY TISSUE: CPT | Mod: 26,,, | Performed by: PATHOLOGY

## 2021-07-02 PROCEDURE — 77012 CT SCAN FOR NEEDLE BIOPSY: CPT | Mod: 26,,, | Performed by: RADIOLOGY

## 2021-07-02 PROCEDURE — 77012 CT SCAN FOR NEEDLE BIOPSY: CPT | Mod: TC | Performed by: RADIOLOGY

## 2021-07-02 PROCEDURE — 63600175 PHARM REV CODE 636 W HCPCS: Performed by: RADIOLOGY

## 2021-07-02 PROCEDURE — A4215 STERILE NEEDLE: HCPCS

## 2021-07-02 PROCEDURE — 20220 BONE BIOPSY TROCAR/NDL SUPFC: CPT | Performed by: RADIOLOGY

## 2021-07-02 PROCEDURE — 25000003 PHARM REV CODE 250: Performed by: STUDENT IN AN ORGANIZED HEALTH CARE EDUCATION/TRAINING PROGRAM

## 2021-07-02 PROCEDURE — 88311 DECALCIFY TISSUE: CPT | Performed by: PATHOLOGY

## 2021-07-02 PROCEDURE — 88307 PR  SURG PATH,LEVEL V: ICD-10-PCS | Mod: 26,,, | Performed by: PATHOLOGY

## 2021-07-02 PROCEDURE — 88307 TISSUE EXAM BY PATHOLOGIST: CPT | Performed by: PATHOLOGY

## 2021-07-02 PROCEDURE — 88342 IMHCHEM/IMCYTCHM 1ST ANTB: CPT | Mod: 26,,, | Performed by: PATHOLOGY

## 2021-07-02 PROCEDURE — 77012 IR BIOPSY BONE DEEP: ICD-10-PCS | Mod: 26,,, | Performed by: RADIOLOGY

## 2021-07-02 PROCEDURE — 88360 TUMOR IMMUNOHISTOCHEM/MANUAL: CPT | Performed by: PATHOLOGY

## 2021-07-02 PROCEDURE — 88342 IMHCHEM/IMCYTCHM 1ST ANTB: CPT | Performed by: PATHOLOGY

## 2021-07-02 PROCEDURE — 88311 PR  DECALCIFY TISSUE: ICD-10-PCS | Mod: 26,,, | Performed by: PATHOLOGY

## 2021-07-02 PROCEDURE — 88307 TISSUE EXAM BY PATHOLOGIST: CPT | Mod: 26,,, | Performed by: PATHOLOGY

## 2021-07-02 PROCEDURE — 88342 CHG IMMUNOCYTOCHEMISTRY: ICD-10-PCS | Mod: 26,,, | Performed by: PATHOLOGY

## 2021-07-02 PROCEDURE — 88341 PR IHC OR ICC EACH ADD'L SINGLE ANTIBODY  STAINPR: ICD-10-PCS | Mod: 26,,, | Performed by: PATHOLOGY

## 2021-07-02 PROCEDURE — 20220 IR BIOPSY BONE DEEP: ICD-10-PCS | Mod: ,,, | Performed by: RADIOLOGY

## 2021-07-02 RX ORDER — FENTANYL CITRATE 50 UG/ML
INJECTION, SOLUTION INTRAMUSCULAR; INTRAVENOUS CODE/TRAUMA/SEDATION MEDICATION
Status: COMPLETED | OUTPATIENT
Start: 2021-07-02 | End: 2021-07-02

## 2021-07-02 RX ORDER — OXYCODONE HYDROCHLORIDE 5 MG/1
5 TABLET ORAL ONCE
Status: COMPLETED | OUTPATIENT
Start: 2021-07-02 | End: 2021-07-02

## 2021-07-02 RX ORDER — SODIUM CHLORIDE 9 MG/ML
INJECTION, SOLUTION INTRAVENOUS ONCE
Status: DISCONTINUED | OUTPATIENT
Start: 2021-07-02 | End: 2021-07-03 | Stop reason: HOSPADM

## 2021-07-02 RX ORDER — MIDAZOLAM HYDROCHLORIDE 1 MG/ML
INJECTION INTRAMUSCULAR; INTRAVENOUS CODE/TRAUMA/SEDATION MEDICATION
Status: COMPLETED | OUTPATIENT
Start: 2021-07-02 | End: 2021-07-02

## 2021-07-02 RX ADMIN — MIDAZOLAM HYDROCHLORIDE 1 MG: 1 INJECTION INTRAMUSCULAR; INTRAVENOUS at 01:07

## 2021-07-02 RX ADMIN — FENTANYL CITRATE 50 MCG: 50 INJECTION, SOLUTION INTRAMUSCULAR; INTRAVENOUS at 01:07

## 2021-07-02 RX ADMIN — MIDAZOLAM HYDROCHLORIDE 0.5 MG: 1 INJECTION INTRAMUSCULAR; INTRAVENOUS at 01:07

## 2021-07-02 RX ADMIN — OXYCODONE HYDROCHLORIDE 5 MG: 5 TABLET ORAL at 10:07

## 2021-07-03 ENCOUNTER — PATIENT MESSAGE (OUTPATIENT)
Dept: SLEEP MEDICINE | Facility: CLINIC | Age: 46
End: 2021-07-03

## 2021-07-06 ENCOUNTER — PATIENT MESSAGE (OUTPATIENT)
Dept: SLEEP MEDICINE | Facility: CLINIC | Age: 46
End: 2021-07-06

## 2021-07-06 LAB — POCT GLUCOSE: 104 MG/DL (ref 70–110)

## 2021-07-07 ENCOUNTER — PATIENT MESSAGE (OUTPATIENT)
Dept: HEMATOLOGY/ONCOLOGY | Facility: CLINIC | Age: 46
End: 2021-07-07

## 2021-07-07 ENCOUNTER — INFUSION (OUTPATIENT)
Dept: INFUSION THERAPY | Facility: HOSPITAL | Age: 46
End: 2021-07-07
Payer: MEDICAID

## 2021-07-07 ENCOUNTER — PATIENT MESSAGE (OUTPATIENT)
Dept: ADMINISTRATIVE | Facility: HOSPITAL | Age: 46
End: 2021-07-07

## 2021-07-07 VITALS
DIASTOLIC BLOOD PRESSURE: 65 MMHG | BODY MASS INDEX: 47.55 KG/M2 | HEART RATE: 77 BPM | WEIGHT: 315 LBS | SYSTOLIC BLOOD PRESSURE: 139 MMHG | RESPIRATION RATE: 18 BRPM | TEMPERATURE: 98 F

## 2021-07-07 DIAGNOSIS — C80.1 ANXIETY ASSOCIATED WITH CANCER DIAGNOSIS: ICD-10-CM

## 2021-07-07 DIAGNOSIS — C50.021 MALIGNANT NEOPLASM INVOLVING BOTH NIPPLE AND AREOLA OF RIGHT BREAST IN MALE, ESTROGEN RECEPTOR NEGATIVE: ICD-10-CM

## 2021-07-07 DIAGNOSIS — F41.1 ANXIETY ASSOCIATED WITH CANCER DIAGNOSIS: ICD-10-CM

## 2021-07-07 DIAGNOSIS — Z17.1 MALIGNANT NEOPLASM INVOLVING BOTH NIPPLE AND AREOLA OF RIGHT BREAST IN MALE, ESTROGEN RECEPTOR NEGATIVE: ICD-10-CM

## 2021-07-07 DIAGNOSIS — C79.51 BONE METASTASES: Primary | ICD-10-CM

## 2021-07-07 PROCEDURE — 63600175 PHARM REV CODE 636 W HCPCS: Performed by: INTERNAL MEDICINE

## 2021-07-07 PROCEDURE — 96374 THER/PROPH/DIAG INJ IV PUSH: CPT

## 2021-07-07 PROCEDURE — 96375 TX/PRO/DX INJ NEW DRUG ADDON: CPT

## 2021-07-07 RX ORDER — HEPARIN 100 UNIT/ML
500 SYRINGE INTRAVENOUS
Status: DISCONTINUED | OUTPATIENT
Start: 2021-07-07 | End: 2021-07-07 | Stop reason: HOSPADM

## 2021-07-07 RX ORDER — SODIUM CHLORIDE 0.9 % (FLUSH) 0.9 %
10 SYRINGE (ML) INJECTION
Status: DISCONTINUED | OUTPATIENT
Start: 2021-07-07 | End: 2021-07-07 | Stop reason: HOSPADM

## 2021-07-07 RX ORDER — ZOLEDRONIC ACID 0.04 MG/ML
4 INJECTION, SOLUTION INTRAVENOUS
Status: COMPLETED | OUTPATIENT
Start: 2021-07-07 | End: 2021-07-07

## 2021-07-07 RX ORDER — HEPARIN 100 UNIT/ML
500 SYRINGE INTRAVENOUS
Status: CANCELLED | OUTPATIENT
Start: 2021-08-04

## 2021-07-07 RX ORDER — ZOLEDRONIC ACID 0.04 MG/ML
4 INJECTION, SOLUTION INTRAVENOUS
Status: CANCELLED | OUTPATIENT
Start: 2021-08-04

## 2021-07-07 RX ORDER — SODIUM CHLORIDE 0.9 % (FLUSH) 0.9 %
10 SYRINGE (ML) INJECTION
Status: CANCELLED | OUTPATIENT
Start: 2021-08-04

## 2021-07-07 RX ADMIN — ZOLEDRONIC ACID 4 MG: 0.04 INJECTION, SOLUTION INTRAVENOUS at 04:07

## 2021-07-08 ENCOUNTER — PATIENT MESSAGE (OUTPATIENT)
Dept: HEMATOLOGY/ONCOLOGY | Facility: CLINIC | Age: 46
End: 2021-07-08

## 2021-07-08 RX ORDER — ALPRAZOLAM 0.5 MG/1
0.5 TABLET ORAL 3 TIMES DAILY PRN
Qty: 60 TABLET | Refills: 0 | Status: SHIPPED | OUTPATIENT
Start: 2021-07-08 | End: 2021-08-19 | Stop reason: SDUPTHER

## 2021-07-08 RX ORDER — HYDROCODONE BITARTRATE AND ACETAMINOPHEN 5; 325 MG/1; MG/1
1 TABLET ORAL EVERY 6 HOURS PRN
Qty: 30 TABLET | Refills: 0 | Status: SHIPPED | OUTPATIENT
Start: 2021-07-08 | End: 2021-07-14 | Stop reason: SDUPTHER

## 2021-07-12 ENCOUNTER — DOCUMENTATION ONLY (OUTPATIENT)
Dept: HEMATOLOGY/ONCOLOGY | Facility: CLINIC | Age: 46
End: 2021-07-12

## 2021-07-12 ENCOUNTER — PATIENT MESSAGE (OUTPATIENT)
Dept: HEMATOLOGY/ONCOLOGY | Facility: CLINIC | Age: 46
End: 2021-07-12

## 2021-07-12 DIAGNOSIS — F41.1 ANXIETY ASSOCIATED WITH CANCER DIAGNOSIS: ICD-10-CM

## 2021-07-12 DIAGNOSIS — C50.021 MALIGNANT NEOPLASM INVOLVING BOTH NIPPLE AND AREOLA OF RIGHT BREAST IN MALE, ESTROGEN RECEPTOR NEGATIVE: ICD-10-CM

## 2021-07-12 DIAGNOSIS — C80.1 ANXIETY ASSOCIATED WITH CANCER DIAGNOSIS: ICD-10-CM

## 2021-07-12 DIAGNOSIS — Z17.1 MALIGNANT NEOPLASM INVOLVING BOTH NIPPLE AND AREOLA OF RIGHT BREAST IN MALE, ESTROGEN RECEPTOR NEGATIVE: ICD-10-CM

## 2021-07-13 ENCOUNTER — PATIENT MESSAGE (OUTPATIENT)
Dept: HEMATOLOGY/ONCOLOGY | Facility: CLINIC | Age: 46
End: 2021-07-13

## 2021-07-13 DIAGNOSIS — C50.021 MALIGNANT NEOPLASM INVOLVING BOTH NIPPLE AND AREOLA OF RIGHT BREAST IN MALE, ESTROGEN RECEPTOR NEGATIVE: ICD-10-CM

## 2021-07-13 DIAGNOSIS — Z17.1 MALIGNANT NEOPLASM INVOLVING BOTH NIPPLE AND AREOLA OF RIGHT BREAST IN MALE, ESTROGEN RECEPTOR NEGATIVE: ICD-10-CM

## 2021-07-13 DIAGNOSIS — C80.1 ANXIETY ASSOCIATED WITH CANCER DIAGNOSIS: ICD-10-CM

## 2021-07-13 DIAGNOSIS — F41.1 ANXIETY ASSOCIATED WITH CANCER DIAGNOSIS: ICD-10-CM

## 2021-07-13 RX ORDER — ALPRAZOLAM 0.5 MG/1
0.5 TABLET ORAL 3 TIMES DAILY PRN
Qty: 60 TABLET | Refills: 0 | OUTPATIENT
Start: 2021-07-13 | End: 2022-07-13

## 2021-07-13 RX ORDER — HYDROCODONE BITARTRATE AND ACETAMINOPHEN 5; 325 MG/1; MG/1
1 TABLET ORAL EVERY 6 HOURS PRN
Qty: 30 TABLET | Refills: 0 | OUTPATIENT
Start: 2021-07-13

## 2021-07-14 DIAGNOSIS — C80.1 ANXIETY ASSOCIATED WITH CANCER DIAGNOSIS: ICD-10-CM

## 2021-07-14 DIAGNOSIS — Z17.1 MALIGNANT NEOPLASM INVOLVING BOTH NIPPLE AND AREOLA OF RIGHT BREAST IN MALE, ESTROGEN RECEPTOR NEGATIVE: ICD-10-CM

## 2021-07-14 DIAGNOSIS — C50.021 MALIGNANT NEOPLASM INVOLVING BOTH NIPPLE AND AREOLA OF RIGHT BREAST IN MALE, ESTROGEN RECEPTOR NEGATIVE: ICD-10-CM

## 2021-07-14 DIAGNOSIS — F41.1 ANXIETY ASSOCIATED WITH CANCER DIAGNOSIS: ICD-10-CM

## 2021-07-14 RX ORDER — HYDROCODONE BITARTRATE AND ACETAMINOPHEN 5; 325 MG/1; MG/1
1 TABLET ORAL EVERY 6 HOURS PRN
Qty: 30 TABLET | Refills: 0 | OUTPATIENT
Start: 2021-07-14

## 2021-07-14 RX ORDER — HYDROCODONE BITARTRATE AND ACETAMINOPHEN 5; 325 MG/1; MG/1
1 TABLET ORAL EVERY 6 HOURS PRN
Qty: 30 TABLET | Refills: 0 | Status: SHIPPED | OUTPATIENT
Start: 2021-07-14 | End: 2021-07-26 | Stop reason: SDUPTHER

## 2021-07-15 RX ORDER — ALPRAZOLAM 0.5 MG/1
0.5 TABLET ORAL 3 TIMES DAILY PRN
Qty: 60 TABLET | Refills: 0 | OUTPATIENT
Start: 2021-07-15 | End: 2022-07-15

## 2021-07-19 LAB
FINAL PATHOLOGIC DIAGNOSIS: NORMAL
GROSS: NORMAL
Lab: NORMAL
MICROSCOPIC EXAM: NORMAL
SUPPLEMENTAL DIAGNOSIS: NORMAL

## 2021-07-21 ENCOUNTER — PATIENT MESSAGE (OUTPATIENT)
Dept: HEMATOLOGY/ONCOLOGY | Facility: CLINIC | Age: 46
End: 2021-07-21

## 2021-07-26 DIAGNOSIS — C50.021 MALIGNANT NEOPLASM INVOLVING BOTH NIPPLE AND AREOLA OF RIGHT BREAST IN MALE, ESTROGEN RECEPTOR NEGATIVE: ICD-10-CM

## 2021-07-26 DIAGNOSIS — C80.1 ANXIETY ASSOCIATED WITH CANCER DIAGNOSIS: ICD-10-CM

## 2021-07-26 DIAGNOSIS — Z17.1 MALIGNANT NEOPLASM INVOLVING BOTH NIPPLE AND AREOLA OF RIGHT BREAST IN MALE, ESTROGEN RECEPTOR NEGATIVE: ICD-10-CM

## 2021-07-26 DIAGNOSIS — F41.1 ANXIETY ASSOCIATED WITH CANCER DIAGNOSIS: ICD-10-CM

## 2021-07-26 RX ORDER — LOSARTAN POTASSIUM 50 MG/1
TABLET ORAL
Qty: 210 TABLET | Refills: 0 | Status: CANCELLED | OUTPATIENT
Start: 2021-07-26

## 2021-07-26 RX ORDER — ALPRAZOLAM 0.5 MG/1
0.5 TABLET ORAL 3 TIMES DAILY PRN
Qty: 60 TABLET | Refills: 0 | OUTPATIENT
Start: 2021-07-26 | End: 2022-07-26

## 2021-07-26 RX ORDER — HYDROCODONE BITARTRATE AND ACETAMINOPHEN 5; 325 MG/1; MG/1
1 TABLET ORAL EVERY 6 HOURS PRN
Qty: 30 TABLET | Refills: 0 | Status: CANCELLED | OUTPATIENT
Start: 2021-07-26

## 2021-07-27 DIAGNOSIS — C50.021 MALIGNANT NEOPLASM INVOLVING BOTH NIPPLE AND AREOLA OF RIGHT BREAST IN MALE, ESTROGEN RECEPTOR NEGATIVE: ICD-10-CM

## 2021-07-27 DIAGNOSIS — Z17.1 MALIGNANT NEOPLASM INVOLVING BOTH NIPPLE AND AREOLA OF RIGHT BREAST IN MALE, ESTROGEN RECEPTOR NEGATIVE: ICD-10-CM

## 2021-07-27 RX ORDER — LOSARTAN POTASSIUM 50 MG/1
TABLET ORAL
Qty: 210 TABLET | Refills: 0 | Status: CANCELLED | OUTPATIENT
Start: 2021-07-26

## 2021-07-27 RX ORDER — HYDROCODONE BITARTRATE AND ACETAMINOPHEN 5; 325 MG/1; MG/1
1 TABLET ORAL EVERY 6 HOURS PRN
Qty: 30 TABLET | Refills: 0 | Status: SHIPPED | OUTPATIENT
Start: 2021-07-27 | End: 2021-08-11 | Stop reason: SDUPTHER

## 2021-07-28 ENCOUNTER — TUMOR BOARD CONFERENCE (OUTPATIENT)
Dept: HEMATOLOGY/ONCOLOGY | Facility: CLINIC | Age: 46
End: 2021-07-28

## 2021-07-28 ENCOUNTER — SPECIALTY PHARMACY (OUTPATIENT)
Dept: PHARMACY | Facility: CLINIC | Age: 46
End: 2021-07-28

## 2021-07-28 ENCOUNTER — TELEPHONE (OUTPATIENT)
Dept: HEMATOLOGY/ONCOLOGY | Facility: CLINIC | Age: 46
End: 2021-07-28

## 2021-07-28 DIAGNOSIS — Z17.1 MALIGNANT NEOPLASM INVOLVING BOTH NIPPLE AND AREOLA OF RIGHT BREAST IN MALE, ESTROGEN RECEPTOR NEGATIVE: Primary | ICD-10-CM

## 2021-07-28 DIAGNOSIS — C79.51 BONE METASTASES: ICD-10-CM

## 2021-07-28 DIAGNOSIS — C50.021 MALIGNANT NEOPLASM INVOLVING BOTH NIPPLE AND AREOLA OF RIGHT BREAST IN MALE, ESTROGEN RECEPTOR NEGATIVE: Primary | ICD-10-CM

## 2021-07-29 ENCOUNTER — HOSPITAL ENCOUNTER (OUTPATIENT)
Dept: RADIOLOGY | Facility: HOSPITAL | Age: 46
Discharge: HOME | End: 2021-07-29
Attending: INTERNAL MEDICINE
Payer: MEDICAID

## 2021-07-29 ENCOUNTER — PATIENT MESSAGE (OUTPATIENT)
Dept: HEMATOLOGY/ONCOLOGY | Facility: CLINIC | Age: 46
End: 2021-07-29

## 2021-07-29 ENCOUNTER — OFFICE VISIT (OUTPATIENT)
Dept: HEMATOLOGY/ONCOLOGY | Facility: CLINIC | Age: 46
End: 2021-07-29
Payer: MEDICAID

## 2021-07-29 VITALS
DIASTOLIC BLOOD PRESSURE: 63 MMHG | WEIGHT: 315 LBS | OXYGEN SATURATION: 96 % | SYSTOLIC BLOOD PRESSURE: 137 MMHG | HEART RATE: 70 BPM | BODY MASS INDEX: 40.43 KG/M2 | HEIGHT: 74 IN | RESPIRATION RATE: 18 BRPM

## 2021-07-29 DIAGNOSIS — Z17.1 MALIGNANT NEOPLASM INVOLVING BOTH NIPPLE AND AREOLA OF RIGHT BREAST IN MALE, ESTROGEN RECEPTOR NEGATIVE: ICD-10-CM

## 2021-07-29 DIAGNOSIS — C50.021 MALIGNANT NEOPLASM INVOLVING BOTH NIPPLE AND AREOLA OF RIGHT BREAST IN MALE, ESTROGEN RECEPTOR NEGATIVE: ICD-10-CM

## 2021-07-29 DIAGNOSIS — Z17.1 MALIGNANT NEOPLASM INVOLVING BOTH NIPPLE AND AREOLA OF RIGHT BREAST IN MALE, ESTROGEN RECEPTOR NEGATIVE: Primary | ICD-10-CM

## 2021-07-29 DIAGNOSIS — C50.021 MALIGNANT NEOPLASM INVOLVING BOTH NIPPLE AND AREOLA OF RIGHT BREAST IN MALE, ESTROGEN RECEPTOR NEGATIVE: Primary | ICD-10-CM

## 2021-07-29 PROCEDURE — 99999 PR PBB SHADOW E&M-EST. PATIENT-LVL V: ICD-10-PCS | Mod: PBBFAC,,, | Performed by: INTERNAL MEDICINE

## 2021-07-29 PROCEDURE — 73502 X-RAY EXAM HIP UNI 2-3 VIEWS: CPT | Mod: TC,LT

## 2021-07-29 PROCEDURE — 99214 PR OFFICE/OUTPT VISIT, EST, LEVL IV, 30-39 MIN: ICD-10-PCS | Mod: S$PBB,,, | Performed by: INTERNAL MEDICINE

## 2021-07-29 PROCEDURE — 99214 OFFICE O/P EST MOD 30 MIN: CPT | Mod: S$PBB,,, | Performed by: INTERNAL MEDICINE

## 2021-07-29 PROCEDURE — 99215 OFFICE O/P EST HI 40 MIN: CPT | Mod: PBBFAC | Performed by: INTERNAL MEDICINE

## 2021-07-29 PROCEDURE — 99999 PR PBB SHADOW E&M-EST. PATIENT-LVL V: CPT | Mod: PBBFAC,,, | Performed by: INTERNAL MEDICINE

## 2021-07-29 PROCEDURE — 73502 X-RAY EXAM HIP UNI 2-3 VIEWS: CPT | Mod: 26,LT,, | Performed by: RADIOLOGY

## 2021-07-29 PROCEDURE — 73502 XR HIP WITH PELVIS WHEN PERFORMED, 2 OR 3 VIEWS LEFT: ICD-10-PCS | Mod: 26,LT,, | Performed by: RADIOLOGY

## 2021-07-29 RX ORDER — LOSARTAN POTASSIUM 50 MG/1
TABLET ORAL
Qty: 180 TABLET | Refills: 3 | Status: SHIPPED | OUTPATIENT
Start: 2021-07-29 | End: 2022-10-12 | Stop reason: SDUPTHER

## 2021-07-30 ENCOUNTER — PATIENT MESSAGE (OUTPATIENT)
Dept: HEMATOLOGY/ONCOLOGY | Facility: CLINIC | Age: 46
End: 2021-07-30

## 2021-08-02 ENCOUNTER — PATIENT MESSAGE (OUTPATIENT)
Dept: UROLOGY | Facility: CLINIC | Age: 46
End: 2021-08-02

## 2021-08-02 ENCOUNTER — IMMUNIZATION (OUTPATIENT)
Dept: PHARMACY | Facility: CLINIC | Age: 46
End: 2021-08-02
Payer: MEDICAID

## 2021-08-02 DIAGNOSIS — Z23 NEED FOR VACCINATION: Primary | ICD-10-CM

## 2021-08-03 ENCOUNTER — PATIENT MESSAGE (OUTPATIENT)
Dept: UROLOGY | Facility: CLINIC | Age: 46
End: 2021-08-03

## 2021-08-04 ENCOUNTER — PATIENT MESSAGE (OUTPATIENT)
Dept: HEMATOLOGY/ONCOLOGY | Facility: CLINIC | Age: 46
End: 2021-08-04

## 2021-08-05 ENCOUNTER — DOCUMENTATION ONLY (OUTPATIENT)
Dept: HEMATOLOGY/ONCOLOGY | Facility: CLINIC | Age: 46
End: 2021-08-05

## 2021-08-06 ENCOUNTER — DOCUMENTATION ONLY (OUTPATIENT)
Dept: HEMATOLOGY/ONCOLOGY | Facility: CLINIC | Age: 46
End: 2021-08-06

## 2021-08-06 ENCOUNTER — PATIENT MESSAGE (OUTPATIENT)
Dept: ADMINISTRATIVE | Facility: OTHER | Age: 46
End: 2021-08-06

## 2021-08-08 ENCOUNTER — PATIENT MESSAGE (OUTPATIENT)
Dept: ADMINISTRATIVE | Facility: OTHER | Age: 46
End: 2021-08-08

## 2021-08-09 ENCOUNTER — PATIENT MESSAGE (OUTPATIENT)
Dept: HEMATOLOGY/ONCOLOGY | Facility: CLINIC | Age: 46
End: 2021-08-09

## 2021-08-09 ENCOUNTER — PATIENT MESSAGE (OUTPATIENT)
Dept: ADMINISTRATIVE | Facility: OTHER | Age: 46
End: 2021-08-09

## 2021-08-09 ENCOUNTER — PATIENT MESSAGE (OUTPATIENT)
Dept: FAMILY MEDICINE | Facility: CLINIC | Age: 46
End: 2021-08-09

## 2021-08-10 ENCOUNTER — PATIENT MESSAGE (OUTPATIENT)
Dept: ADMINISTRATIVE | Facility: OTHER | Age: 46
End: 2021-08-10

## 2021-08-10 ENCOUNTER — PATIENT MESSAGE (OUTPATIENT)
Dept: FAMILY MEDICINE | Facility: CLINIC | Age: 46
End: 2021-08-10

## 2021-08-11 ENCOUNTER — PATIENT MESSAGE (OUTPATIENT)
Dept: ADMINISTRATIVE | Facility: OTHER | Age: 46
End: 2021-08-11

## 2021-08-11 DIAGNOSIS — Z17.1 MALIGNANT NEOPLASM INVOLVING BOTH NIPPLE AND AREOLA OF RIGHT BREAST IN MALE, ESTROGEN RECEPTOR NEGATIVE: ICD-10-CM

## 2021-08-11 DIAGNOSIS — C50.021 MALIGNANT NEOPLASM INVOLVING BOTH NIPPLE AND AREOLA OF RIGHT BREAST IN MALE, ESTROGEN RECEPTOR NEGATIVE: ICD-10-CM

## 2021-08-12 ENCOUNTER — PATIENT MESSAGE (OUTPATIENT)
Dept: ADMINISTRATIVE | Facility: OTHER | Age: 46
End: 2021-08-12

## 2021-08-12 RX ORDER — HYDROCODONE BITARTRATE AND ACETAMINOPHEN 5; 325 MG/1; MG/1
1 TABLET ORAL EVERY 6 HOURS PRN
Qty: 30 TABLET | Refills: 0 | Status: SHIPPED | OUTPATIENT
Start: 2021-08-12 | End: 2021-08-18 | Stop reason: SDUPTHER

## 2021-08-13 ENCOUNTER — OFFICE VISIT (OUTPATIENT)
Dept: HEMATOLOGY/ONCOLOGY | Facility: CLINIC | Age: 46
End: 2021-08-13
Payer: MEDICAID

## 2021-08-13 ENCOUNTER — INFUSION (OUTPATIENT)
Dept: INFUSION THERAPY | Facility: HOSPITAL | Age: 46
End: 2021-08-13
Payer: MEDICAID

## 2021-08-13 ENCOUNTER — PATIENT MESSAGE (OUTPATIENT)
Dept: ADMINISTRATIVE | Facility: OTHER | Age: 46
End: 2021-08-13

## 2021-08-13 VITALS
DIASTOLIC BLOOD PRESSURE: 72 MMHG | WEIGHT: 315 LBS | HEART RATE: 64 BPM | TEMPERATURE: 98 F | BODY MASS INDEX: 47.41 KG/M2 | SYSTOLIC BLOOD PRESSURE: 138 MMHG

## 2021-08-13 VITALS
SYSTOLIC BLOOD PRESSURE: 129 MMHG | HEIGHT: 74 IN | TEMPERATURE: 98 F | DIASTOLIC BLOOD PRESSURE: 71 MMHG | HEART RATE: 59 BPM | BODY MASS INDEX: 40.43 KG/M2 | RESPIRATION RATE: 18 BRPM | WEIGHT: 315 LBS

## 2021-08-13 DIAGNOSIS — C79.51 BONE METASTASES: ICD-10-CM

## 2021-08-13 DIAGNOSIS — G89.3 NEOPLASM RELATED PAIN: ICD-10-CM

## 2021-08-13 DIAGNOSIS — C50.021 MALIGNANT NEOPLASM INVOLVING BOTH NIPPLE AND AREOLA OF RIGHT BREAST IN MALE, ESTROGEN RECEPTOR NEGATIVE: Primary | ICD-10-CM

## 2021-08-13 DIAGNOSIS — E11.9 TYPE 2 DIABETES MELLITUS WITHOUT COMPLICATION, WITHOUT LONG-TERM CURRENT USE OF INSULIN: ICD-10-CM

## 2021-08-13 DIAGNOSIS — Z17.1 MALIGNANT NEOPLASM INVOLVING BOTH NIPPLE AND AREOLA OF RIGHT BREAST IN MALE, ESTROGEN RECEPTOR NEGATIVE: Primary | ICD-10-CM

## 2021-08-13 DIAGNOSIS — K59.00 CONSTIPATION, UNSPECIFIED CONSTIPATION TYPE: ICD-10-CM

## 2021-08-13 PROCEDURE — 99212 OFFICE O/P EST SF 10 MIN: CPT | Mod: PBBFAC,25 | Performed by: NURSE PRACTITIONER

## 2021-08-13 PROCEDURE — 63600175 PHARM REV CODE 636 W HCPCS: Mod: JG | Performed by: INTERNAL MEDICINE

## 2021-08-13 PROCEDURE — 96413 CHEMO IV INFUSION 1 HR: CPT

## 2021-08-13 PROCEDURE — 25000003 PHARM REV CODE 250: Performed by: INTERNAL MEDICINE

## 2021-08-13 PROCEDURE — 99215 OFFICE O/P EST HI 40 MIN: CPT | Mod: S$PBB,,, | Performed by: NURSE PRACTITIONER

## 2021-08-13 PROCEDURE — 99999 PR PBB SHADOW E&M-EST. PATIENT-LVL II: CPT | Mod: PBBFAC,,, | Performed by: NURSE PRACTITIONER

## 2021-08-13 PROCEDURE — 99999 PR PBB SHADOW E&M-EST. PATIENT-LVL II: ICD-10-PCS | Mod: PBBFAC,,, | Performed by: NURSE PRACTITIONER

## 2021-08-13 PROCEDURE — 99215 PR OFFICE/OUTPT VISIT, EST, LEVL V, 40-54 MIN: ICD-10-PCS | Mod: S$PBB,,, | Performed by: NURSE PRACTITIONER

## 2021-08-13 RX ORDER — HEPARIN 100 UNIT/ML
500 SYRINGE INTRAVENOUS
Status: DISCONTINUED | OUTPATIENT
Start: 2021-08-13 | End: 2021-08-13 | Stop reason: HOSPADM

## 2021-08-13 RX ORDER — ONDANSETRON 8 MG/1
8 TABLET, ORALLY DISINTEGRATING ORAL EVERY 8 HOURS PRN
Qty: 30 TABLET | Refills: 2 | Status: SHIPPED | OUTPATIENT
Start: 2021-08-13 | End: 2022-08-13

## 2021-08-13 RX ORDER — SODIUM CHLORIDE 0.9 % (FLUSH) 0.9 %
10 SYRINGE (ML) INJECTION
Status: DISCONTINUED | OUTPATIENT
Start: 2021-08-13 | End: 2021-08-13 | Stop reason: HOSPADM

## 2021-08-13 RX ADMIN — SODIUM CHLORIDE: 9 INJECTION, SOLUTION INTRAVENOUS at 03:08

## 2021-08-13 RX ADMIN — PACLITAXEL 300 MG: 100 INJECTION, POWDER, LYOPHILIZED, FOR SUSPENSION INTRAVENOUS at 04:08

## 2021-08-14 ENCOUNTER — PATIENT MESSAGE (OUTPATIENT)
Dept: ADMINISTRATIVE | Facility: OTHER | Age: 46
End: 2021-08-14

## 2021-08-15 ENCOUNTER — PATIENT MESSAGE (OUTPATIENT)
Dept: ADMINISTRATIVE | Facility: OTHER | Age: 46
End: 2021-08-15

## 2021-08-16 ENCOUNTER — PATIENT MESSAGE (OUTPATIENT)
Dept: ADMINISTRATIVE | Facility: OTHER | Age: 46
End: 2021-08-16

## 2021-08-17 ENCOUNTER — PATIENT OUTREACH (OUTPATIENT)
Dept: ADMINISTRATIVE | Facility: OTHER | Age: 46
End: 2021-08-17

## 2021-08-17 ENCOUNTER — PATIENT MESSAGE (OUTPATIENT)
Dept: ADMINISTRATIVE | Facility: OTHER | Age: 46
End: 2021-08-17

## 2021-08-18 ENCOUNTER — PATIENT MESSAGE (OUTPATIENT)
Dept: ADMINISTRATIVE | Facility: OTHER | Age: 46
End: 2021-08-18

## 2021-08-18 ENCOUNTER — PATIENT MESSAGE (OUTPATIENT)
Dept: HEMATOLOGY/ONCOLOGY | Facility: CLINIC | Age: 46
End: 2021-08-18

## 2021-08-18 DIAGNOSIS — C50.021 MALIGNANT NEOPLASM INVOLVING BOTH NIPPLE AND AREOLA OF RIGHT BREAST IN MALE, ESTROGEN RECEPTOR NEGATIVE: ICD-10-CM

## 2021-08-18 DIAGNOSIS — Z17.1 MALIGNANT NEOPLASM INVOLVING BOTH NIPPLE AND AREOLA OF RIGHT BREAST IN MALE, ESTROGEN RECEPTOR NEGATIVE: ICD-10-CM

## 2021-08-19 ENCOUNTER — OFFICE VISIT (OUTPATIENT)
Dept: UROLOGY | Facility: CLINIC | Age: 46
End: 2021-08-19
Payer: MEDICARE

## 2021-08-19 ENCOUNTER — TELEPHONE (OUTPATIENT)
Dept: HEMATOLOGY/ONCOLOGY | Facility: CLINIC | Age: 46
End: 2021-08-19

## 2021-08-19 ENCOUNTER — LAB VISIT (OUTPATIENT)
Dept: LAB | Facility: HOSPITAL | Age: 46
End: 2021-08-19
Attending: INTERNAL MEDICINE
Payer: MEDICAID

## 2021-08-19 ENCOUNTER — PATIENT MESSAGE (OUTPATIENT)
Dept: ADMINISTRATIVE | Facility: OTHER | Age: 46
End: 2021-08-19

## 2021-08-19 ENCOUNTER — DOCUMENTATION ONLY (OUTPATIENT)
Dept: HEMATOLOGY/ONCOLOGY | Facility: CLINIC | Age: 46
End: 2021-08-19

## 2021-08-19 ENCOUNTER — OFFICE VISIT (OUTPATIENT)
Dept: HEMATOLOGY/ONCOLOGY | Facility: CLINIC | Age: 46
End: 2021-08-19
Payer: MEDICARE

## 2021-08-19 VITALS — WEIGHT: 315 LBS | BODY MASS INDEX: 40.43 KG/M2 | HEIGHT: 74 IN

## 2021-08-19 DIAGNOSIS — C79.51 BONE METASTASES: ICD-10-CM

## 2021-08-19 DIAGNOSIS — E11.65 UNCONTROLLED TYPE 2 DIABETES MELLITUS WITH HYPERGLYCEMIA: ICD-10-CM

## 2021-08-19 DIAGNOSIS — C50.021 MALIGNANT NEOPLASM INVOLVING BOTH NIPPLE AND AREOLA OF RIGHT BREAST IN MALE, ESTROGEN RECEPTOR NEGATIVE: ICD-10-CM

## 2021-08-19 DIAGNOSIS — C50.021 MALIGNANT NEOPLASM INVOLVING BOTH NIPPLE AND AREOLA OF RIGHT BREAST IN MALE, ESTROGEN RECEPTOR NEGATIVE: Primary | ICD-10-CM

## 2021-08-19 DIAGNOSIS — T45.1X5A CHEMOTHERAPY INDUCED NEUTROPENIA: ICD-10-CM

## 2021-08-19 DIAGNOSIS — Z17.1 MALIGNANT NEOPLASM INVOLVING BOTH NIPPLE AND AREOLA OF RIGHT BREAST IN MALE, ESTROGEN RECEPTOR NEGATIVE: Primary | ICD-10-CM

## 2021-08-19 DIAGNOSIS — Z17.1 MALIGNANT NEOPLASM INVOLVING BOTH NIPPLE AND AREOLA OF RIGHT BREAST IN MALE, ESTROGEN RECEPTOR NEGATIVE: ICD-10-CM

## 2021-08-19 DIAGNOSIS — N43.3 HYDROCELE IN ADULT: Primary | ICD-10-CM

## 2021-08-19 DIAGNOSIS — D70.1 CHEMOTHERAPY INDUCED NEUTROPENIA: ICD-10-CM

## 2021-08-19 DIAGNOSIS — F41.1 ANXIETY ASSOCIATED WITH CANCER DIAGNOSIS: ICD-10-CM

## 2021-08-19 DIAGNOSIS — C80.1 ANXIETY ASSOCIATED WITH CANCER DIAGNOSIS: ICD-10-CM

## 2021-08-19 DIAGNOSIS — D63.0 ANEMIA IN NEOPLASTIC DISEASE: ICD-10-CM

## 2021-08-19 LAB
ALBUMIN SERPL BCP-MCNC: 4.2 G/DL (ref 3.5–5.2)
ALP SERPL-CCNC: 120 U/L (ref 55–135)
ALT SERPL W/O P-5'-P-CCNC: 33 U/L (ref 10–44)
ANION GAP SERPL CALC-SCNC: 11 MMOL/L (ref 8–16)
AST SERPL-CCNC: 20 U/L (ref 10–40)
BASOPHILS # BLD AUTO: 0.01 K/UL (ref 0–0.2)
BASOPHILS NFR BLD: 0.4 % (ref 0–1.9)
BILIRUB SERPL-MCNC: 0.8 MG/DL (ref 0.1–1)
BUN SERPL-MCNC: 15 MG/DL (ref 6–20)
CALCIUM SERPL-MCNC: 9 MG/DL (ref 8.7–10.5)
CHLORIDE SERPL-SCNC: 103 MMOL/L (ref 95–110)
CO2 SERPL-SCNC: 25 MMOL/L (ref 23–29)
CREAT SERPL-MCNC: 1 MG/DL (ref 0.5–1.4)
DIFFERENTIAL METHOD: ABNORMAL
EOSINOPHIL # BLD AUTO: 0 K/UL (ref 0–0.5)
EOSINOPHIL NFR BLD: 0.4 % (ref 0–8)
ERYTHROCYTE [DISTWIDTH] IN BLOOD BY AUTOMATED COUNT: 15.8 % (ref 11.5–14.5)
EST. GFR  (AFRICAN AMERICAN): >60 ML/MIN/1.73 M^2
EST. GFR  (NON AFRICAN AMERICAN): >60 ML/MIN/1.73 M^2
GLUCOSE SERPL-MCNC: 90 MG/DL (ref 70–110)
HCT VFR BLD AUTO: 35.8 % (ref 40–54)
HGB BLD-MCNC: 11.5 G/DL (ref 14–18)
IMM GRANULOCYTES # BLD AUTO: 0.01 K/UL (ref 0–0.04)
IMM GRANULOCYTES NFR BLD AUTO: 0.4 % (ref 0–0.5)
LYMPHOCYTES # BLD AUTO: 1.3 K/UL (ref 1–4.8)
LYMPHOCYTES NFR BLD: 50 % (ref 18–48)
MCH RBC QN AUTO: 24.8 PG (ref 27–31)
MCHC RBC AUTO-ENTMCNC: 32.1 G/DL (ref 32–36)
MCV RBC AUTO: 77 FL (ref 82–98)
MONOCYTES # BLD AUTO: 0.1 K/UL (ref 0.3–1)
MONOCYTES NFR BLD: 4.9 % (ref 4–15)
NEUTROPHILS # BLD AUTO: 1.2 K/UL (ref 1.8–7.7)
NEUTROPHILS NFR BLD: 43.9 % (ref 38–73)
NRBC BLD-RTO: 0 /100 WBC
PLATELET # BLD AUTO: 205 K/UL (ref 150–450)
PMV BLD AUTO: 9.9 FL (ref 9.2–12.9)
POTASSIUM SERPL-SCNC: 3.9 MMOL/L (ref 3.5–5.1)
PROT SERPL-MCNC: 7.9 G/DL (ref 6–8.4)
RBC # BLD AUTO: 4.64 M/UL (ref 4.6–6.2)
SODIUM SERPL-SCNC: 139 MMOL/L (ref 136–145)
WBC # BLD AUTO: 2.68 K/UL (ref 3.9–12.7)

## 2021-08-19 PROCEDURE — 99999 PR PBB SHADOW E&M-EST. PATIENT-LVL IV: ICD-10-PCS | Mod: PBBFAC,,, | Performed by: UROLOGY

## 2021-08-19 PROCEDURE — 99215 OFFICE O/P EST HI 40 MIN: CPT | Mod: 95,,, | Performed by: NURSE PRACTITIONER

## 2021-08-19 PROCEDURE — 99213 OFFICE O/P EST LOW 20 MIN: CPT | Mod: 25,S$PBB,, | Performed by: UROLOGY

## 2021-08-19 PROCEDURE — 36415 COLL VENOUS BLD VENIPUNCTURE: CPT | Mod: PO | Performed by: INTERNAL MEDICINE

## 2021-08-19 PROCEDURE — 85025 COMPLETE CBC W/AUTO DIFF WBC: CPT | Mod: PO | Performed by: INTERNAL MEDICINE

## 2021-08-19 PROCEDURE — 55000 DRAINAGE OF HYDROCELE: CPT | Mod: PBBFAC,PO | Performed by: UROLOGY

## 2021-08-19 PROCEDURE — 99215 PR OFFICE/OUTPT VISIT, EST, LEVL V, 40-54 MIN: ICD-10-PCS | Mod: 95,,, | Performed by: NURSE PRACTITIONER

## 2021-08-19 PROCEDURE — 99214 OFFICE O/P EST MOD 30 MIN: CPT | Mod: PBBFAC,PO | Performed by: UROLOGY

## 2021-08-19 PROCEDURE — 55000 DRAINAGE OF HYDROCELE: CPT | Mod: S$PBB,RT,, | Performed by: UROLOGY

## 2021-08-19 PROCEDURE — 99213 PR OFFICE/OUTPT VISIT, EST, LEVL III, 20-29 MIN: ICD-10-PCS | Mod: 25,S$PBB,, | Performed by: UROLOGY

## 2021-08-19 PROCEDURE — 99999 PR PBB SHADOW E&M-EST. PATIENT-LVL IV: CPT | Mod: PBBFAC,,, | Performed by: UROLOGY

## 2021-08-19 PROCEDURE — 80053 COMPREHEN METABOLIC PANEL: CPT | Mod: PO | Performed by: INTERNAL MEDICINE

## 2021-08-19 PROCEDURE — 55000 PR DRAINAGE OF HYDROCELE,TUNICA: ICD-10-PCS | Mod: S$PBB,RT,, | Performed by: UROLOGY

## 2021-08-19 RX ORDER — ALPRAZOLAM 0.5 MG/1
0.5 TABLET ORAL 3 TIMES DAILY PRN
Qty: 60 TABLET | Refills: 0 | Status: SHIPPED | OUTPATIENT
Start: 2021-08-19 | End: 2021-09-06 | Stop reason: SDUPTHER

## 2021-08-19 RX ORDER — HYDROCODONE BITARTRATE AND ACETAMINOPHEN 5; 325 MG/1; MG/1
1 TABLET ORAL EVERY 6 HOURS PRN
Qty: 60 TABLET | Refills: 0 | Status: SHIPPED | OUTPATIENT
Start: 2021-08-19 | End: 2021-09-06 | Stop reason: SDUPTHER

## 2021-08-19 RX ORDER — HEPARIN 100 UNIT/ML
500 SYRINGE INTRAVENOUS
Status: CANCELLED | OUTPATIENT
Start: 2021-08-20

## 2021-08-19 RX ORDER — HYDROCHLOROTHIAZIDE 25 MG/1
TABLET ORAL
Qty: 90 TABLET | Refills: 3 | Status: CANCELLED | OUTPATIENT
Start: 2021-08-19

## 2021-08-19 RX ORDER — SODIUM CHLORIDE 0.9 % (FLUSH) 0.9 %
10 SYRINGE (ML) INJECTION
Status: CANCELLED | OUTPATIENT
Start: 2021-08-20

## 2021-08-19 RX ORDER — METFORMIN HYDROCHLORIDE 500 MG/1
1000 TABLET ORAL 2 TIMES DAILY WITH MEALS
Qty: 90 TABLET | Refills: 3 | Status: SHIPPED | OUTPATIENT
Start: 2021-08-19 | End: 2021-12-14 | Stop reason: SDUPTHER

## 2021-08-20 ENCOUNTER — PATIENT MESSAGE (OUTPATIENT)
Dept: ADMINISTRATIVE | Facility: OTHER | Age: 46
End: 2021-08-20

## 2021-08-20 ENCOUNTER — INFUSION (OUTPATIENT)
Dept: INFUSION THERAPY | Facility: HOSPITAL | Age: 46
End: 2021-08-20
Payer: MEDICAID

## 2021-08-20 VITALS
BODY MASS INDEX: 40.43 KG/M2 | WEIGHT: 315 LBS | HEART RATE: 71 BPM | DIASTOLIC BLOOD PRESSURE: 52 MMHG | TEMPERATURE: 99 F | HEIGHT: 74 IN | SYSTOLIC BLOOD PRESSURE: 111 MMHG

## 2021-08-20 DIAGNOSIS — Z17.1 MALIGNANT NEOPLASM INVOLVING BOTH NIPPLE AND AREOLA OF RIGHT BREAST IN MALE, ESTROGEN RECEPTOR NEGATIVE: Primary | ICD-10-CM

## 2021-08-20 DIAGNOSIS — C50.021 MALIGNANT NEOPLASM INVOLVING BOTH NIPPLE AND AREOLA OF RIGHT BREAST IN MALE, ESTROGEN RECEPTOR NEGATIVE: Primary | ICD-10-CM

## 2021-08-20 PROCEDURE — 96413 CHEMO IV INFUSION 1 HR: CPT

## 2021-08-20 PROCEDURE — 63600175 PHARM REV CODE 636 W HCPCS: Mod: JG | Performed by: INTERNAL MEDICINE

## 2021-08-20 PROCEDURE — A4216 STERILE WATER/SALINE, 10 ML: HCPCS | Performed by: INTERNAL MEDICINE

## 2021-08-20 PROCEDURE — 25000003 PHARM REV CODE 250: Performed by: INTERNAL MEDICINE

## 2021-08-20 RX ORDER — HYDROCHLOROTHIAZIDE 25 MG/1
TABLET ORAL
Qty: 90 TABLET | Refills: 3 | Status: SHIPPED | OUTPATIENT
Start: 2021-08-20 | End: 2022-10-12 | Stop reason: SDUPTHER

## 2021-08-20 RX ORDER — SODIUM CHLORIDE 0.9 % (FLUSH) 0.9 %
10 SYRINGE (ML) INJECTION
Status: DISCONTINUED | OUTPATIENT
Start: 2021-08-20 | End: 2021-08-20 | Stop reason: HOSPADM

## 2021-08-20 RX ORDER — HEPARIN 100 UNIT/ML
500 SYRINGE INTRAVENOUS
Status: DISCONTINUED | OUTPATIENT
Start: 2021-08-20 | End: 2021-08-20 | Stop reason: HOSPADM

## 2021-08-20 RX ADMIN — PACLITAXEL 300 MG: 100 INJECTION, POWDER, LYOPHILIZED, FOR SUSPENSION INTRAVENOUS at 03:08

## 2021-08-20 RX ADMIN — Medication 10 ML: at 04:08

## 2021-08-20 RX ADMIN — SODIUM CHLORIDE: 9 INJECTION, SOLUTION INTRAVENOUS at 03:08

## 2021-08-22 ENCOUNTER — PATIENT MESSAGE (OUTPATIENT)
Dept: ADMINISTRATIVE | Facility: OTHER | Age: 46
End: 2021-08-22

## 2021-08-24 ENCOUNTER — PATIENT MESSAGE (OUTPATIENT)
Dept: ADMINISTRATIVE | Facility: OTHER | Age: 46
End: 2021-08-24

## 2021-08-25 ENCOUNTER — PATIENT MESSAGE (OUTPATIENT)
Dept: ADMINISTRATIVE | Facility: OTHER | Age: 46
End: 2021-08-25

## 2021-08-26 ENCOUNTER — LAB VISIT (OUTPATIENT)
Dept: LAB | Facility: HOSPITAL | Age: 46
End: 2021-08-26
Attending: INTERNAL MEDICINE
Payer: MEDICAID

## 2021-08-26 ENCOUNTER — OFFICE VISIT (OUTPATIENT)
Dept: HEMATOLOGY/ONCOLOGY | Facility: CLINIC | Age: 46
End: 2021-08-26
Payer: MEDICAID

## 2021-08-26 VITALS
DIASTOLIC BLOOD PRESSURE: 67 MMHG | HEART RATE: 70 BPM | BODY MASS INDEX: 40.43 KG/M2 | RESPIRATION RATE: 16 BRPM | HEIGHT: 74 IN | TEMPERATURE: 98 F | SYSTOLIC BLOOD PRESSURE: 132 MMHG | OXYGEN SATURATION: 97 % | WEIGHT: 315 LBS

## 2021-08-26 DIAGNOSIS — C50.021 MALIGNANT NEOPLASM INVOLVING BOTH NIPPLE AND AREOLA OF RIGHT BREAST IN MALE, ESTROGEN RECEPTOR NEGATIVE: Primary | ICD-10-CM

## 2021-08-26 DIAGNOSIS — E66.01 MORBID OBESITY WITH BMI OF 50.0-59.9, ADULT: ICD-10-CM

## 2021-08-26 DIAGNOSIS — D70.1 LEUKOPENIA DUE TO ANTINEOPLASTIC CHEMOTHERAPY: ICD-10-CM

## 2021-08-26 DIAGNOSIS — I10 ESSENTIAL HYPERTENSION: ICD-10-CM

## 2021-08-26 DIAGNOSIS — F43.23 ADJUSTMENT DISORDER WITH MIXED ANXIETY AND DEPRESSED MOOD: ICD-10-CM

## 2021-08-26 DIAGNOSIS — C79.51 BONE METASTASES: ICD-10-CM

## 2021-08-26 DIAGNOSIS — T45.1X5A LEUKOPENIA DUE TO ANTINEOPLASTIC CHEMOTHERAPY: ICD-10-CM

## 2021-08-26 DIAGNOSIS — C50.021 MALIGNANT NEOPLASM INVOLVING BOTH NIPPLE AND AREOLA OF RIGHT BREAST IN MALE, ESTROGEN RECEPTOR NEGATIVE: ICD-10-CM

## 2021-08-26 DIAGNOSIS — E11.65 UNCONTROLLED TYPE 2 DIABETES MELLITUS WITH HYPERGLYCEMIA: ICD-10-CM

## 2021-08-26 DIAGNOSIS — Z17.1 MALIGNANT NEOPLASM INVOLVING BOTH NIPPLE AND AREOLA OF RIGHT BREAST IN MALE, ESTROGEN RECEPTOR NEGATIVE: Primary | ICD-10-CM

## 2021-08-26 DIAGNOSIS — Z17.1 MALIGNANT NEOPLASM INVOLVING BOTH NIPPLE AND AREOLA OF RIGHT BREAST IN MALE, ESTROGEN RECEPTOR NEGATIVE: ICD-10-CM

## 2021-08-26 LAB
ALBUMIN SERPL BCP-MCNC: 4.2 G/DL (ref 3.5–5.2)
ALP SERPL-CCNC: 102 U/L (ref 55–135)
ALT SERPL W/O P-5'-P-CCNC: 40 U/L (ref 10–44)
ANION GAP SERPL CALC-SCNC: 8 MMOL/L (ref 8–16)
AST SERPL-CCNC: 22 U/L (ref 10–40)
BASOPHILS # BLD AUTO: 0.01 K/UL (ref 0–0.2)
BASOPHILS NFR BLD: 0.3 % (ref 0–1.9)
BILIRUB SERPL-MCNC: 0.8 MG/DL (ref 0.1–1)
BUN SERPL-MCNC: 16 MG/DL (ref 6–20)
CALCIUM SERPL-MCNC: 9.9 MG/DL (ref 8.7–10.5)
CHLORIDE SERPL-SCNC: 103 MMOL/L (ref 95–110)
CO2 SERPL-SCNC: 27 MMOL/L (ref 23–29)
CREAT SERPL-MCNC: 1.3 MG/DL (ref 0.5–1.4)
DIFFERENTIAL METHOD: ABNORMAL
EOSINOPHIL # BLD AUTO: 0 K/UL (ref 0–0.5)
EOSINOPHIL NFR BLD: 0.3 % (ref 0–8)
ERYTHROCYTE [DISTWIDTH] IN BLOOD BY AUTOMATED COUNT: 16.2 % (ref 11.5–14.5)
EST. GFR  (AFRICAN AMERICAN): >60 ML/MIN/1.73 M^2
EST. GFR  (NON AFRICAN AMERICAN): >60 ML/MIN/1.73 M^2
GLUCOSE SERPL-MCNC: 134 MG/DL (ref 70–110)
HCT VFR BLD AUTO: 36.9 % (ref 40–54)
HGB BLD-MCNC: 11.6 G/DL (ref 14–18)
HYPOCHROMIA BLD QL SMEAR: ABNORMAL
IMM GRANULOCYTES # BLD AUTO: 0.01 K/UL (ref 0–0.04)
IMM GRANULOCYTES NFR BLD AUTO: 0.3 % (ref 0–0.5)
LYMPHOCYTES # BLD AUTO: 2.1 K/UL (ref 1–4.8)
LYMPHOCYTES NFR BLD: 65.7 % (ref 18–48)
MCH RBC QN AUTO: 24.2 PG (ref 27–31)
MCHC RBC AUTO-ENTMCNC: 31.4 G/DL (ref 32–36)
MCV RBC AUTO: 77 FL (ref 82–98)
MONOCYTES # BLD AUTO: 0.1 K/UL (ref 0.3–1)
MONOCYTES NFR BLD: 3.8 % (ref 4–15)
NEUTROPHILS # BLD AUTO: 0.9 K/UL (ref 1.8–7.7)
NEUTROPHILS NFR BLD: 29.6 % (ref 38–73)
NRBC BLD-RTO: 0 /100 WBC
PLATELET # BLD AUTO: 246 K/UL (ref 150–450)
PLATELET BLD QL SMEAR: ABNORMAL
PMV BLD AUTO: 10 FL (ref 9.2–12.9)
POTASSIUM SERPL-SCNC: 4.3 MMOL/L (ref 3.5–5.1)
PROT SERPL-MCNC: 7.8 G/DL (ref 6–8.4)
RBC # BLD AUTO: 4.79 M/UL (ref 4.6–6.2)
SODIUM SERPL-SCNC: 138 MMOL/L (ref 136–145)
WBC # BLD AUTO: 3.15 K/UL (ref 3.9–12.7)

## 2021-08-26 PROCEDURE — 85025 COMPLETE CBC W/AUTO DIFF WBC: CPT | Performed by: INTERNAL MEDICINE

## 2021-08-26 PROCEDURE — 99214 OFFICE O/P EST MOD 30 MIN: CPT | Mod: PBBFAC | Performed by: INTERNAL MEDICINE

## 2021-08-26 PROCEDURE — 36415 COLL VENOUS BLD VENIPUNCTURE: CPT | Performed by: INTERNAL MEDICINE

## 2021-08-26 PROCEDURE — 99214 PR OFFICE/OUTPT VISIT, EST, LEVL IV, 30-39 MIN: ICD-10-PCS | Mod: S$PBB,,, | Performed by: INTERNAL MEDICINE

## 2021-08-26 PROCEDURE — 99214 OFFICE O/P EST MOD 30 MIN: CPT | Mod: S$PBB,,, | Performed by: INTERNAL MEDICINE

## 2021-08-26 PROCEDURE — 80053 COMPREHEN METABOLIC PANEL: CPT | Performed by: INTERNAL MEDICINE

## 2021-08-26 PROCEDURE — 99999 PR PBB SHADOW E&M-EST. PATIENT-LVL IV: ICD-10-PCS | Mod: PBBFAC,,, | Performed by: INTERNAL MEDICINE

## 2021-08-26 PROCEDURE — 99999 PR PBB SHADOW E&M-EST. PATIENT-LVL IV: CPT | Mod: PBBFAC,,, | Performed by: INTERNAL MEDICINE

## 2021-08-27 ENCOUNTER — PATIENT MESSAGE (OUTPATIENT)
Dept: ADMINISTRATIVE | Facility: OTHER | Age: 46
End: 2021-08-27

## 2021-09-04 ENCOUNTER — PATIENT MESSAGE (OUTPATIENT)
Dept: HEMATOLOGY/ONCOLOGY | Facility: CLINIC | Age: 46
End: 2021-09-04

## 2021-09-06 DIAGNOSIS — C50.021 MALIGNANT NEOPLASM INVOLVING BOTH NIPPLE AND AREOLA OF RIGHT BREAST IN MALE, ESTROGEN RECEPTOR NEGATIVE: ICD-10-CM

## 2021-09-06 DIAGNOSIS — C80.1 ANXIETY ASSOCIATED WITH CANCER DIAGNOSIS: ICD-10-CM

## 2021-09-06 DIAGNOSIS — Z17.1 MALIGNANT NEOPLASM INVOLVING BOTH NIPPLE AND AREOLA OF RIGHT BREAST IN MALE, ESTROGEN RECEPTOR NEGATIVE: ICD-10-CM

## 2021-09-06 DIAGNOSIS — F41.1 ANXIETY ASSOCIATED WITH CANCER DIAGNOSIS: ICD-10-CM

## 2021-09-07 RX ORDER — HYDROCODONE BITARTRATE AND ACETAMINOPHEN 5; 325 MG/1; MG/1
1 TABLET ORAL EVERY 6 HOURS PRN
Qty: 60 TABLET | Refills: 0 | Status: SHIPPED | OUTPATIENT
Start: 2021-09-07 | End: 2021-10-01 | Stop reason: SDUPTHER

## 2021-09-07 RX ORDER — ALPRAZOLAM 0.5 MG/1
0.5 TABLET ORAL 3 TIMES DAILY PRN
Qty: 60 TABLET | Refills: 0 | Status: SHIPPED | OUTPATIENT
Start: 2021-09-07 | End: 2021-11-10 | Stop reason: SDUPTHER

## 2021-09-10 ENCOUNTER — PATIENT MESSAGE (OUTPATIENT)
Dept: HEMATOLOGY/ONCOLOGY | Facility: CLINIC | Age: 46
End: 2021-09-10

## 2021-09-10 ENCOUNTER — OFFICE VISIT (OUTPATIENT)
Dept: HEMATOLOGY/ONCOLOGY | Facility: CLINIC | Age: 46
End: 2021-09-10
Payer: MEDICAID

## 2021-09-10 ENCOUNTER — INFUSION (OUTPATIENT)
Dept: INFUSION THERAPY | Facility: HOSPITAL | Age: 46
End: 2021-09-10
Attending: INTERNAL MEDICINE
Payer: MEDICAID

## 2021-09-10 VITALS
DIASTOLIC BLOOD PRESSURE: 71 MMHG | RESPIRATION RATE: 18 BRPM | SYSTOLIC BLOOD PRESSURE: 133 MMHG | TEMPERATURE: 98 F | HEART RATE: 60 BPM

## 2021-09-10 VITALS
OXYGEN SATURATION: 97 % | TEMPERATURE: 98 F | HEART RATE: 66 BPM | RESPIRATION RATE: 18 BRPM | SYSTOLIC BLOOD PRESSURE: 162 MMHG | DIASTOLIC BLOOD PRESSURE: 75 MMHG | BODY MASS INDEX: 40.43 KG/M2 | HEIGHT: 74 IN | WEIGHT: 315 LBS

## 2021-09-10 DIAGNOSIS — C50.021 MALIGNANT NEOPLASM INVOLVING BOTH NIPPLE AND AREOLA OF RIGHT BREAST IN MALE, ESTROGEN RECEPTOR NEGATIVE: Primary | ICD-10-CM

## 2021-09-10 DIAGNOSIS — C79.51 BONE METASTASES: ICD-10-CM

## 2021-09-10 DIAGNOSIS — F41.9 ANXIETY AND DEPRESSION: ICD-10-CM

## 2021-09-10 DIAGNOSIS — Z17.1 MALIGNANT NEOPLASM INVOLVING BOTH NIPPLE AND AREOLA OF RIGHT BREAST IN MALE, ESTROGEN RECEPTOR NEGATIVE: Primary | ICD-10-CM

## 2021-09-10 DIAGNOSIS — C79.51 BONE METASTASES: Primary | ICD-10-CM

## 2021-09-10 DIAGNOSIS — F32.A ANXIETY AND DEPRESSION: ICD-10-CM

## 2021-09-10 DIAGNOSIS — Z17.1 MALIGNANT NEOPLASM INVOLVING BOTH NIPPLE AND AREOLA OF RIGHT BREAST IN MALE, ESTROGEN RECEPTOR NEGATIVE: ICD-10-CM

## 2021-09-10 DIAGNOSIS — C50.021 MALIGNANT NEOPLASM INVOLVING BOTH NIPPLE AND AREOLA OF RIGHT BREAST IN MALE, ESTROGEN RECEPTOR NEGATIVE: ICD-10-CM

## 2021-09-10 DIAGNOSIS — D70.1 CHEMOTHERAPY-INDUCED NEUTROPENIA: ICD-10-CM

## 2021-09-10 DIAGNOSIS — T45.1X5A CHEMOTHERAPY-INDUCED NEUTROPENIA: ICD-10-CM

## 2021-09-10 DIAGNOSIS — D50.9 MICROCYTIC ANEMIA: ICD-10-CM

## 2021-09-10 PROCEDURE — 96413 CHEMO IV INFUSION 1 HR: CPT

## 2021-09-10 PROCEDURE — 99999 PR PBB SHADOW E&M-EST. PATIENT-LVL IV: CPT | Mod: PBBFAC,,, | Performed by: NURSE PRACTITIONER

## 2021-09-10 PROCEDURE — 96375 TX/PRO/DX INJ NEW DRUG ADDON: CPT

## 2021-09-10 PROCEDURE — 25000003 PHARM REV CODE 250: Performed by: INTERNAL MEDICINE

## 2021-09-10 PROCEDURE — 99215 OFFICE O/P EST HI 40 MIN: CPT | Mod: S$PBB,,, | Performed by: NURSE PRACTITIONER

## 2021-09-10 PROCEDURE — 99215 PR OFFICE/OUTPT VISIT, EST, LEVL V, 40-54 MIN: ICD-10-PCS | Mod: S$PBB,,, | Performed by: NURSE PRACTITIONER

## 2021-09-10 PROCEDURE — 63600175 PHARM REV CODE 636 W HCPCS: Performed by: INTERNAL MEDICINE

## 2021-09-10 PROCEDURE — 99214 OFFICE O/P EST MOD 30 MIN: CPT | Mod: PBBFAC,25 | Performed by: NURSE PRACTITIONER

## 2021-09-10 PROCEDURE — 99999 PR PBB SHADOW E&M-EST. PATIENT-LVL IV: ICD-10-PCS | Mod: PBBFAC,,, | Performed by: NURSE PRACTITIONER

## 2021-09-10 RX ORDER — SODIUM CHLORIDE 0.9 % (FLUSH) 0.9 %
10 SYRINGE (ML) INJECTION
Status: CANCELLED | OUTPATIENT
Start: 2021-09-17

## 2021-09-10 RX ORDER — SODIUM CHLORIDE 0.9 % (FLUSH) 0.9 %
10 SYRINGE (ML) INJECTION
Status: CANCELLED | OUTPATIENT
Start: 2021-09-10

## 2021-09-10 RX ORDER — FLUOXETINE 10 MG/1
10 CAPSULE ORAL DAILY
Qty: 30 CAPSULE | Refills: 2 | Status: SHIPPED | OUTPATIENT
Start: 2021-09-10 | End: 2021-10-22

## 2021-09-10 RX ORDER — HEPARIN 100 UNIT/ML
500 SYRINGE INTRAVENOUS
Status: CANCELLED | OUTPATIENT
Start: 2021-09-17

## 2021-09-10 RX ORDER — SODIUM CHLORIDE 0.9 % (FLUSH) 0.9 %
10 SYRINGE (ML) INJECTION
Status: DISCONTINUED | OUTPATIENT
Start: 2021-09-10 | End: 2021-09-10 | Stop reason: HOSPADM

## 2021-09-10 RX ORDER — ZOLEDRONIC ACID 0.04 MG/ML
4 INJECTION, SOLUTION INTRAVENOUS
Status: CANCELLED | OUTPATIENT
Start: 2021-10-01

## 2021-09-10 RX ORDER — SODIUM CHLORIDE 0.9 % (FLUSH) 0.9 %
10 SYRINGE (ML) INJECTION
Status: CANCELLED | OUTPATIENT
Start: 2021-09-24

## 2021-09-10 RX ORDER — ZOLEDRONIC ACID 0.04 MG/ML
4 INJECTION, SOLUTION INTRAVENOUS
Status: COMPLETED | OUTPATIENT
Start: 2021-09-10 | End: 2021-09-10

## 2021-09-10 RX ORDER — HEPARIN 100 UNIT/ML
500 SYRINGE INTRAVENOUS
Status: CANCELLED | OUTPATIENT
Start: 2021-09-24

## 2021-09-10 RX ORDER — HEPARIN 100 UNIT/ML
500 SYRINGE INTRAVENOUS
Status: CANCELLED | OUTPATIENT
Start: 2021-10-01

## 2021-09-10 RX ORDER — HEPARIN 100 UNIT/ML
500 SYRINGE INTRAVENOUS
Status: DISCONTINUED | OUTPATIENT
Start: 2021-09-10 | End: 2021-09-10 | Stop reason: HOSPADM

## 2021-09-10 RX ORDER — SODIUM CHLORIDE 0.9 % (FLUSH) 0.9 %
10 SYRINGE (ML) INJECTION
Status: CANCELLED | OUTPATIENT
Start: 2021-10-01

## 2021-09-10 RX ORDER — HEPARIN 100 UNIT/ML
500 SYRINGE INTRAVENOUS
Status: CANCELLED | OUTPATIENT
Start: 2021-09-10

## 2021-09-10 RX ADMIN — ZOLEDRONIC ACID 4 MG: 0.04 INJECTION, SOLUTION INTRAVENOUS at 04:09

## 2021-09-10 RX ADMIN — SODIUM CHLORIDE: 9 INJECTION, SOLUTION INTRAVENOUS at 04:09

## 2021-09-10 RX ADMIN — PACLITAXEL 300 MG: 100 INJECTION, POWDER, LYOPHILIZED, FOR SUSPENSION INTRAVENOUS at 04:09

## 2021-09-12 ENCOUNTER — PATIENT MESSAGE (OUTPATIENT)
Dept: HEMATOLOGY/ONCOLOGY | Facility: CLINIC | Age: 46
End: 2021-09-12

## 2021-09-12 ENCOUNTER — PATIENT MESSAGE (OUTPATIENT)
Dept: ADMINISTRATIVE | Facility: OTHER | Age: 46
End: 2021-09-12

## 2021-09-13 ENCOUNTER — PATIENT MESSAGE (OUTPATIENT)
Dept: HEMATOLOGY/ONCOLOGY | Facility: CLINIC | Age: 46
End: 2021-09-13

## 2021-09-14 DIAGNOSIS — Z17.1 MALIGNANT NEOPLASM INVOLVING BOTH NIPPLE AND AREOLA OF RIGHT BREAST IN MALE, ESTROGEN RECEPTOR NEGATIVE: Primary | ICD-10-CM

## 2021-09-14 DIAGNOSIS — C79.51 BONE METASTASES: ICD-10-CM

## 2021-09-14 DIAGNOSIS — C50.021 MALIGNANT NEOPLASM INVOLVING BOTH NIPPLE AND AREOLA OF RIGHT BREAST IN MALE, ESTROGEN RECEPTOR NEGATIVE: Primary | ICD-10-CM

## 2021-09-15 ENCOUNTER — OFFICE VISIT (OUTPATIENT)
Dept: SURGERY | Facility: CLINIC | Age: 46
End: 2021-09-15
Payer: MEDICAID

## 2021-09-15 ENCOUNTER — OFFICE VISIT (OUTPATIENT)
Dept: HEMATOLOGY/ONCOLOGY | Facility: CLINIC | Age: 46
End: 2021-09-15
Payer: MEDICAID

## 2021-09-15 ENCOUNTER — PATIENT MESSAGE (OUTPATIENT)
Dept: HEMATOLOGY/ONCOLOGY | Facility: CLINIC | Age: 46
End: 2021-09-15

## 2021-09-15 ENCOUNTER — LAB VISIT (OUTPATIENT)
Dept: LAB | Facility: HOSPITAL | Age: 46
End: 2021-09-15
Attending: NURSE PRACTITIONER
Payer: MEDICAID

## 2021-09-15 VITALS
OXYGEN SATURATION: 98 % | BODY MASS INDEX: 40.43 KG/M2 | WEIGHT: 315 LBS | DIASTOLIC BLOOD PRESSURE: 72 MMHG | HEART RATE: 82 BPM | HEIGHT: 74 IN | SYSTOLIC BLOOD PRESSURE: 143 MMHG

## 2021-09-15 DIAGNOSIS — D50.9 MICROCYTIC ANEMIA: ICD-10-CM

## 2021-09-15 DIAGNOSIS — C79.51 BONE METASTASES: ICD-10-CM

## 2021-09-15 DIAGNOSIS — Z17.1 MALIGNANT NEOPLASM INVOLVING BOTH NIPPLE AND AREOLA OF RIGHT BREAST IN MALE, ESTROGEN RECEPTOR NEGATIVE: ICD-10-CM

## 2021-09-15 DIAGNOSIS — D69.59 CHEMOTHERAPY-INDUCED THROMBOCYTOPENIA: ICD-10-CM

## 2021-09-15 DIAGNOSIS — T45.1X5A CHEMOTHERAPY-INDUCED THROMBOCYTOPENIA: ICD-10-CM

## 2021-09-15 DIAGNOSIS — C50.021 MALIGNANT NEOPLASM INVOLVING BOTH NIPPLE AND AREOLA OF RIGHT BREAST IN MALE, ESTROGEN RECEPTOR NEGATIVE: Primary | ICD-10-CM

## 2021-09-15 DIAGNOSIS — Z17.1 MALIGNANT NEOPLASM INVOLVING BOTH NIPPLE AND AREOLA OF RIGHT BREAST IN MALE, ESTROGEN RECEPTOR NEGATIVE: Primary | ICD-10-CM

## 2021-09-15 DIAGNOSIS — T45.1X5A CHEMOTHERAPY-INDUCED NEUTROPENIA: ICD-10-CM

## 2021-09-15 DIAGNOSIS — F43.23 ADJUSTMENT DISORDER WITH MIXED ANXIETY AND DEPRESSED MOOD: ICD-10-CM

## 2021-09-15 DIAGNOSIS — D70.1 CHEMOTHERAPY-INDUCED NEUTROPENIA: ICD-10-CM

## 2021-09-15 DIAGNOSIS — C50.021 MALIGNANT NEOPLASM INVOLVING BOTH NIPPLE AND AREOLA OF RIGHT BREAST IN MALE, ESTROGEN RECEPTOR NEGATIVE: ICD-10-CM

## 2021-09-15 LAB
ALBUMIN SERPL BCP-MCNC: 4.1 G/DL (ref 3.5–5.2)
ALP SERPL-CCNC: 120 U/L (ref 55–135)
ALT SERPL W/O P-5'-P-CCNC: 30 U/L (ref 10–44)
ANION GAP SERPL CALC-SCNC: 13 MMOL/L (ref 8–16)
AST SERPL-CCNC: 22 U/L (ref 10–40)
BILIRUB SERPL-MCNC: 0.8 MG/DL (ref 0.1–1)
BUN SERPL-MCNC: 12 MG/DL (ref 6–20)
CALCIUM SERPL-MCNC: 9.6 MG/DL (ref 8.7–10.5)
CHLORIDE SERPL-SCNC: 104 MMOL/L (ref 95–110)
CO2 SERPL-SCNC: 22 MMOL/L (ref 23–29)
CREAT SERPL-MCNC: 1.3 MG/DL (ref 0.5–1.4)
ERYTHROCYTE [DISTWIDTH] IN BLOOD BY AUTOMATED COUNT: 16.9 % (ref 11.5–14.5)
EST. GFR  (AFRICAN AMERICAN): >60 ML/MIN/1.73 M^2
EST. GFR  (NON AFRICAN AMERICAN): >60 ML/MIN/1.73 M^2
FERRITIN SERPL-MCNC: 291 NG/ML (ref 20–300)
GLUCOSE SERPL-MCNC: 178 MG/DL (ref 70–110)
HCT VFR BLD AUTO: 38.3 % (ref 40–54)
HGB BLD-MCNC: 12.1 G/DL (ref 14–18)
IMM GRANULOCYTES # BLD AUTO: 0 K/UL (ref 0–0.04)
IRON SERPL-MCNC: 81 UG/DL (ref 45–160)
MCH RBC QN AUTO: 24.3 PG (ref 27–31)
MCHC RBC AUTO-ENTMCNC: 31.6 G/DL (ref 32–36)
MCV RBC AUTO: 77 FL (ref 82–98)
NEUTROPHILS # BLD AUTO: 1.4 K/UL (ref 1.8–7.7)
PLATELET # BLD AUTO: 137 K/UL (ref 150–450)
PMV BLD AUTO: 10.8 FL (ref 9.2–12.9)
POTASSIUM SERPL-SCNC: 4.5 MMOL/L (ref 3.5–5.1)
PROT SERPL-MCNC: 7.9 G/DL (ref 6–8.4)
RBC # BLD AUTO: 4.97 M/UL (ref 4.6–6.2)
SATURATED IRON: 21 % (ref 20–50)
SODIUM SERPL-SCNC: 139 MMOL/L (ref 136–145)
TOTAL IRON BINDING CAPACITY: 379 UG/DL (ref 250–450)
TRANSFERRIN SERPL-MCNC: 256 MG/DL (ref 200–375)
WBC # BLD AUTO: 2.98 K/UL (ref 3.9–12.7)

## 2021-09-15 PROCEDURE — 99214 PR OFFICE/OUTPT VISIT, EST, LEVL IV, 30-39 MIN: ICD-10-PCS | Mod: S$PBB,,, | Performed by: STUDENT IN AN ORGANIZED HEALTH CARE EDUCATION/TRAINING PROGRAM

## 2021-09-15 PROCEDURE — 99214 OFFICE O/P EST MOD 30 MIN: CPT | Mod: S$PBB,,, | Performed by: STUDENT IN AN ORGANIZED HEALTH CARE EDUCATION/TRAINING PROGRAM

## 2021-09-15 PROCEDURE — 80053 COMPREHEN METABOLIC PANEL: CPT | Performed by: NURSE PRACTITIONER

## 2021-09-15 PROCEDURE — 99214 PR OFFICE/OUTPT VISIT, EST, LEVL IV, 30-39 MIN: ICD-10-PCS | Mod: 95,,, | Performed by: NURSE PRACTITIONER

## 2021-09-15 PROCEDURE — 99999 PR PBB SHADOW E&M-EST. PATIENT-LVL IV: CPT | Mod: PBBFAC,,, | Performed by: STUDENT IN AN ORGANIZED HEALTH CARE EDUCATION/TRAINING PROGRAM

## 2021-09-15 PROCEDURE — 82728 ASSAY OF FERRITIN: CPT | Performed by: NURSE PRACTITIONER

## 2021-09-15 PROCEDURE — 99214 OFFICE O/P EST MOD 30 MIN: CPT | Mod: 95,,, | Performed by: NURSE PRACTITIONER

## 2021-09-15 PROCEDURE — 36415 COLL VENOUS BLD VENIPUNCTURE: CPT | Performed by: NURSE PRACTITIONER

## 2021-09-15 PROCEDURE — 99214 OFFICE O/P EST MOD 30 MIN: CPT | Mod: PBBFAC | Performed by: STUDENT IN AN ORGANIZED HEALTH CARE EDUCATION/TRAINING PROGRAM

## 2021-09-15 PROCEDURE — 85027 COMPLETE CBC AUTOMATED: CPT | Performed by: NURSE PRACTITIONER

## 2021-09-15 PROCEDURE — 83540 ASSAY OF IRON: CPT | Performed by: NURSE PRACTITIONER

## 2021-09-15 PROCEDURE — 99999 PR PBB SHADOW E&M-EST. PATIENT-LVL IV: ICD-10-PCS | Mod: PBBFAC,,, | Performed by: STUDENT IN AN ORGANIZED HEALTH CARE EDUCATION/TRAINING PROGRAM

## 2021-09-16 ENCOUNTER — ANESTHESIA EVENT (OUTPATIENT)
Dept: SURGERY | Facility: HOSPITAL | Age: 46
End: 2021-09-16
Payer: MEDICAID

## 2021-09-16 ENCOUNTER — PATIENT MESSAGE (OUTPATIENT)
Dept: SURGERY | Facility: HOSPITAL | Age: 46
End: 2021-09-16

## 2021-09-16 ENCOUNTER — PATIENT MESSAGE (OUTPATIENT)
Dept: HEMATOLOGY/ONCOLOGY | Facility: CLINIC | Age: 46
End: 2021-09-16

## 2021-09-16 ENCOUNTER — TELEPHONE (OUTPATIENT)
Dept: SURGERY | Facility: CLINIC | Age: 46
End: 2021-09-16

## 2021-09-17 ENCOUNTER — ANESTHESIA (OUTPATIENT)
Dept: SURGERY | Facility: HOSPITAL | Age: 46
End: 2021-09-17
Payer: MEDICAID

## 2021-09-17 ENCOUNTER — INFUSION (OUTPATIENT)
Dept: INFUSION THERAPY | Facility: HOSPITAL | Age: 46
End: 2021-09-17
Attending: STUDENT IN AN ORGANIZED HEALTH CARE EDUCATION/TRAINING PROGRAM
Payer: MEDICAID

## 2021-09-17 ENCOUNTER — HOSPITAL ENCOUNTER (OUTPATIENT)
Facility: HOSPITAL | Age: 46
Discharge: HOME | End: 2021-09-17
Attending: STUDENT IN AN ORGANIZED HEALTH CARE EDUCATION/TRAINING PROGRAM | Admitting: STUDENT IN AN ORGANIZED HEALTH CARE EDUCATION/TRAINING PROGRAM
Payer: MEDICAID

## 2021-09-17 VITALS
HEIGHT: 74 IN | WEIGHT: 315 LBS | BODY MASS INDEX: 40.43 KG/M2 | DIASTOLIC BLOOD PRESSURE: 74 MMHG | HEART RATE: 56 BPM | SYSTOLIC BLOOD PRESSURE: 136 MMHG | TEMPERATURE: 98 F | RESPIRATION RATE: 18 BRPM

## 2021-09-17 VITALS
RESPIRATION RATE: 17 BRPM | OXYGEN SATURATION: 98 % | HEART RATE: 59 BPM | SYSTOLIC BLOOD PRESSURE: 104 MMHG | HEIGHT: 74 IN | TEMPERATURE: 97 F | WEIGHT: 315 LBS | DIASTOLIC BLOOD PRESSURE: 58 MMHG | BODY MASS INDEX: 40.43 KG/M2

## 2021-09-17 DIAGNOSIS — C50.021 MALIGNANT NEOPLASM INVOLVING BOTH NIPPLE AND AREOLA OF RIGHT BREAST IN MALE, ESTROGEN RECEPTOR NEGATIVE: Primary | ICD-10-CM

## 2021-09-17 DIAGNOSIS — Z17.1 MALIGNANT NEOPLASM INVOLVING BOTH NIPPLE AND AREOLA OF RIGHT BREAST IN MALE, ESTROGEN RECEPTOR NEGATIVE: Primary | ICD-10-CM

## 2021-09-17 DIAGNOSIS — C50.919 BREAST CANCER: Primary | ICD-10-CM

## 2021-09-17 LAB
POCT GLUCOSE: 107 MG/DL (ref 70–110)
SARS-COV-2 RDRP RESP QL NAA+PROBE: NEGATIVE

## 2021-09-17 PROCEDURE — 63600175 PHARM REV CODE 636 W HCPCS: Performed by: ANESTHESIOLOGY

## 2021-09-17 PROCEDURE — 96413 CHEMO IV INFUSION 1 HR: CPT

## 2021-09-17 PROCEDURE — 36561 INSERT TUNNELED CV CATH: CPT | Mod: LT,,, | Performed by: STUDENT IN AN ORGANIZED HEALTH CARE EDUCATION/TRAINING PROGRAM

## 2021-09-17 PROCEDURE — 25000003 PHARM REV CODE 250: Performed by: NURSE ANESTHETIST, CERTIFIED REGISTERED

## 2021-09-17 PROCEDURE — U0002 COVID-19 LAB TEST NON-CDC: HCPCS | Performed by: STUDENT IN AN ORGANIZED HEALTH CARE EDUCATION/TRAINING PROGRAM

## 2021-09-17 PROCEDURE — 37000008 HC ANESTHESIA 1ST 15 MINUTES: Performed by: STUDENT IN AN ORGANIZED HEALTH CARE EDUCATION/TRAINING PROGRAM

## 2021-09-17 PROCEDURE — 63600175 PHARM REV CODE 636 W HCPCS: Performed by: STUDENT IN AN ORGANIZED HEALTH CARE EDUCATION/TRAINING PROGRAM

## 2021-09-17 PROCEDURE — 63600175 PHARM REV CODE 636 W HCPCS: Performed by: NURSE ANESTHETIST, CERTIFIED REGISTERED

## 2021-09-17 PROCEDURE — 36000706: Performed by: STUDENT IN AN ORGANIZED HEALTH CARE EDUCATION/TRAINING PROGRAM

## 2021-09-17 PROCEDURE — 82962 GLUCOSE BLOOD TEST: CPT | Performed by: STUDENT IN AN ORGANIZED HEALTH CARE EDUCATION/TRAINING PROGRAM

## 2021-09-17 PROCEDURE — D9220A PRA ANESTHESIA: Mod: ANES,,, | Performed by: ANESTHESIOLOGY

## 2021-09-17 PROCEDURE — D9220A PRA ANESTHESIA: ICD-10-PCS | Mod: ANES,,, | Performed by: ANESTHESIOLOGY

## 2021-09-17 PROCEDURE — 25000003 PHARM REV CODE 250: Performed by: STUDENT IN AN ORGANIZED HEALTH CARE EDUCATION/TRAINING PROGRAM

## 2021-09-17 PROCEDURE — 77001 CHG FLUOROGUIDE CNTRL VEN ACCESS,PLACE,REPLACE,REMOVE: ICD-10-PCS | Mod: 26,,, | Performed by: STUDENT IN AN ORGANIZED HEALTH CARE EDUCATION/TRAINING PROGRAM

## 2021-09-17 PROCEDURE — 25000003 PHARM REV CODE 250: Performed by: INTERNAL MEDICINE

## 2021-09-17 PROCEDURE — 71000044 HC DOSC ROUTINE RECOVERY FIRST HOUR: Performed by: STUDENT IN AN ORGANIZED HEALTH CARE EDUCATION/TRAINING PROGRAM

## 2021-09-17 PROCEDURE — 27201037 HC PRESSURE MONITORING SET UP

## 2021-09-17 PROCEDURE — 36561 PR INSERT TUNNELED CV CATH WITH PORT: ICD-10-PCS | Mod: LT,,, | Performed by: STUDENT IN AN ORGANIZED HEALTH CARE EDUCATION/TRAINING PROGRAM

## 2021-09-17 PROCEDURE — A4216 STERILE WATER/SALINE, 10 ML: HCPCS | Performed by: INTERNAL MEDICINE

## 2021-09-17 PROCEDURE — 71000015 HC POSTOP RECOV 1ST HR: Performed by: STUDENT IN AN ORGANIZED HEALTH CARE EDUCATION/TRAINING PROGRAM

## 2021-09-17 PROCEDURE — 36000707: Performed by: STUDENT IN AN ORGANIZED HEALTH CARE EDUCATION/TRAINING PROGRAM

## 2021-09-17 PROCEDURE — 00532 ANES ACCESS CTR VENOUS CRCJ: CPT | Performed by: STUDENT IN AN ORGANIZED HEALTH CARE EDUCATION/TRAINING PROGRAM

## 2021-09-17 PROCEDURE — 77001 FLUOROGUIDE FOR VEIN DEVICE: CPT | Mod: 26,,, | Performed by: STUDENT IN AN ORGANIZED HEALTH CARE EDUCATION/TRAINING PROGRAM

## 2021-09-17 PROCEDURE — 37000009 HC ANESTHESIA EA ADD 15 MINS: Performed by: STUDENT IN AN ORGANIZED HEALTH CARE EDUCATION/TRAINING PROGRAM

## 2021-09-17 PROCEDURE — 63600175 PHARM REV CODE 636 W HCPCS: Mod: JG | Performed by: INTERNAL MEDICINE

## 2021-09-17 PROCEDURE — C1788 PORT, INDWELLING, IMP: HCPCS | Performed by: STUDENT IN AN ORGANIZED HEALTH CARE EDUCATION/TRAINING PROGRAM

## 2021-09-17 DEVICE — KIT POWERPORT SINGLE 8FR: Type: IMPLANTABLE DEVICE | Site: CHEST | Status: FUNCTIONAL

## 2021-09-17 RX ORDER — ONDANSETRON 2 MG/ML
4 INJECTION INTRAMUSCULAR; INTRAVENOUS ONCE AS NEEDED
Status: DISCONTINUED | OUTPATIENT
Start: 2021-09-17 | End: 2021-09-17 | Stop reason: HOSPADM

## 2021-09-17 RX ORDER — HYDROMORPHONE HYDROCHLORIDE 1 MG/ML
0.2 INJECTION, SOLUTION INTRAMUSCULAR; INTRAVENOUS; SUBCUTANEOUS EVERY 5 MIN PRN
Status: DISCONTINUED | OUTPATIENT
Start: 2021-09-17 | End: 2021-09-17 | Stop reason: HOSPADM

## 2021-09-17 RX ORDER — FENTANYL CITRATE 50 UG/ML
INJECTION, SOLUTION INTRAMUSCULAR; INTRAVENOUS
Status: DISCONTINUED | OUTPATIENT
Start: 2021-09-17 | End: 2021-09-17

## 2021-09-17 RX ORDER — SODIUM CHLORIDE 9 MG/ML
INJECTION, SOLUTION INTRAVENOUS CONTINUOUS
Status: DISCONTINUED | OUTPATIENT
Start: 2021-09-17 | End: 2021-09-17 | Stop reason: HOSPADM

## 2021-09-17 RX ORDER — PROPOFOL 10 MG/ML
VIAL (ML) INTRAVENOUS CONTINUOUS PRN
Status: DISCONTINUED | OUTPATIENT
Start: 2021-09-17 | End: 2021-09-17

## 2021-09-17 RX ORDER — ONDANSETRON 2 MG/ML
4 INJECTION INTRAMUSCULAR; INTRAVENOUS EVERY 12 HOURS PRN
Status: DISCONTINUED | OUTPATIENT
Start: 2021-09-17 | End: 2021-09-17 | Stop reason: HOSPADM

## 2021-09-17 RX ORDER — KETAMINE HCL IN 0.9 % NACL 50 MG/5 ML
SYRINGE (ML) INTRAVENOUS
Status: DISCONTINUED | OUTPATIENT
Start: 2021-09-17 | End: 2021-09-17

## 2021-09-17 RX ORDER — FENTANYL CITRATE 50 UG/ML
25 INJECTION, SOLUTION INTRAMUSCULAR; INTRAVENOUS EVERY 5 MIN PRN
Status: DISCONTINUED | OUTPATIENT
Start: 2021-09-17 | End: 2021-09-17 | Stop reason: HOSPADM

## 2021-09-17 RX ORDER — LIDOCAINE HYDROCHLORIDE 20 MG/ML
INJECTION, SOLUTION EPIDURAL; INFILTRATION; INTRACAUDAL; PERINEURAL
Status: DISCONTINUED | OUTPATIENT
Start: 2021-09-17 | End: 2021-09-17

## 2021-09-17 RX ORDER — SODIUM CHLORIDE 0.9 % (FLUSH) 0.9 %
10 SYRINGE (ML) INJECTION
Status: DISCONTINUED | OUTPATIENT
Start: 2021-09-17 | End: 2021-09-17 | Stop reason: HOSPADM

## 2021-09-17 RX ORDER — OXYCODONE HYDROCHLORIDE 5 MG/1
5 TABLET ORAL EVERY 4 HOURS PRN
Qty: 6 TABLET | Refills: 0 | Status: SHIPPED | OUTPATIENT
Start: 2021-09-17 | End: 2021-12-13

## 2021-09-17 RX ORDER — HEPARIN SODIUM 1000 [USP'U]/ML
INJECTION, SOLUTION INTRAVENOUS; SUBCUTANEOUS
Status: DISCONTINUED | OUTPATIENT
Start: 2021-09-17 | End: 2021-09-17 | Stop reason: HOSPADM

## 2021-09-17 RX ORDER — BUPIVACAINE HYDROCHLORIDE 5 MG/ML
INJECTION, SOLUTION EPIDURAL; INTRACAUDAL
Status: DISCONTINUED | OUTPATIENT
Start: 2021-09-17 | End: 2021-09-17 | Stop reason: HOSPADM

## 2021-09-17 RX ORDER — DIPHENHYDRAMINE HYDROCHLORIDE 50 MG/ML
25 INJECTION INTRAMUSCULAR; INTRAVENOUS EVERY 6 HOURS PRN
Status: DISCONTINUED | OUTPATIENT
Start: 2021-09-17 | End: 2021-09-17 | Stop reason: HOSPADM

## 2021-09-17 RX ORDER — MIDAZOLAM HYDROCHLORIDE 1 MG/ML
INJECTION INTRAMUSCULAR; INTRAVENOUS
Status: DISCONTINUED | OUTPATIENT
Start: 2021-09-17 | End: 2021-09-17

## 2021-09-17 RX ORDER — OXYCODONE HYDROCHLORIDE 5 MG/1
5 TABLET ORAL ONCE
Status: DISCONTINUED | OUTPATIENT
Start: 2021-09-17 | End: 2021-09-17 | Stop reason: HOSPADM

## 2021-09-17 RX ORDER — HEPARIN 100 UNIT/ML
500 SYRINGE INTRAVENOUS
Status: DISCONTINUED | OUTPATIENT
Start: 2021-09-17 | End: 2021-09-17 | Stop reason: HOSPADM

## 2021-09-17 RX ADMIN — FENTANYL CITRATE 50 MCG: 50 INJECTION, SOLUTION INTRAMUSCULAR; INTRAVENOUS at 01:09

## 2021-09-17 RX ADMIN — CEFAZOLIN SODIUM 50 ML: 1 INJECTION, POWDER, FOR SOLUTION INTRAMUSCULAR; INTRAVENOUS at 01:09

## 2021-09-17 RX ADMIN — Medication 10 MG: at 02:09

## 2021-09-17 RX ADMIN — SODIUM CHLORIDE: 9 INJECTION, SOLUTION INTRAVENOUS at 04:09

## 2021-09-17 RX ADMIN — LIDOCAINE HYDROCHLORIDE 50 MG: 20 INJECTION, SOLUTION EPIDURAL; INFILTRATION; INTRACAUDAL; PERINEURAL at 01:09

## 2021-09-17 RX ADMIN — PACLITAXEL 300 MG: 100 INJECTION, POWDER, LYOPHILIZED, FOR SUSPENSION INTRAVENOUS at 05:09

## 2021-09-17 RX ADMIN — Medication 10 ML: at 06:09

## 2021-09-17 RX ADMIN — HEPARIN 500 UNITS: 100 SYRINGE at 06:09

## 2021-09-17 RX ADMIN — SODIUM CHLORIDE: 9 INJECTION, SOLUTION INTRAVENOUS at 01:09

## 2021-09-17 RX ADMIN — PROPOFOL 100 MCG/KG/MIN: 10 INJECTION, EMULSION INTRAVENOUS at 01:09

## 2021-09-17 RX ADMIN — Medication 10 MG: at 01:09

## 2021-09-17 RX ADMIN — MIDAZOLAM HYDROCHLORIDE 1 MG: 1 INJECTION, SOLUTION INTRAMUSCULAR; INTRAVENOUS at 01:09

## 2021-09-17 RX ADMIN — FENTANYL CITRATE 25 MCG: 50 INJECTION INTRAMUSCULAR; INTRAVENOUS at 03:09

## 2021-09-20 LAB — POCT GLUCOSE: 99 MG/DL (ref 70–110)

## 2021-09-23 ENCOUNTER — LAB VISIT (OUTPATIENT)
Dept: LAB | Facility: HOSPITAL | Age: 46
End: 2021-09-23
Payer: MEDICAID

## 2021-09-23 DIAGNOSIS — C50.021 MALIGNANT NEOPLASM INVOLVING BOTH NIPPLE AND AREOLA OF RIGHT BREAST IN MALE, ESTROGEN RECEPTOR NEGATIVE: ICD-10-CM

## 2021-09-23 DIAGNOSIS — Z17.1 MALIGNANT NEOPLASM INVOLVING BOTH NIPPLE AND AREOLA OF RIGHT BREAST IN MALE, ESTROGEN RECEPTOR NEGATIVE: ICD-10-CM

## 2021-09-23 LAB
BASOPHILS # BLD AUTO: 0.02 K/UL (ref 0–0.2)
BASOPHILS NFR BLD: 0.5 % (ref 0–1.9)
DIFFERENTIAL METHOD: ABNORMAL
EOSINOPHIL # BLD AUTO: 0 K/UL (ref 0–0.5)
EOSINOPHIL NFR BLD: 0.3 % (ref 0–8)
ERYTHROCYTE [DISTWIDTH] IN BLOOD BY AUTOMATED COUNT: 17 % (ref 11.5–14.5)
HCT VFR BLD AUTO: 37.8 % (ref 40–54)
HGB BLD-MCNC: 12.1 G/DL (ref 14–18)
IMM GRANULOCYTES # BLD AUTO: 0.01 K/UL (ref 0–0.04)
IMM GRANULOCYTES NFR BLD AUTO: 0.3 % (ref 0–0.5)
LYMPHOCYTES # BLD AUTO: 2.1 K/UL (ref 1–4.8)
LYMPHOCYTES NFR BLD: 55.6 % (ref 18–48)
MCH RBC QN AUTO: 24.8 PG (ref 27–31)
MCHC RBC AUTO-ENTMCNC: 32 G/DL (ref 32–36)
MCV RBC AUTO: 78 FL (ref 82–98)
MONOCYTES # BLD AUTO: 0.2 K/UL (ref 0.3–1)
MONOCYTES NFR BLD: 6.3 % (ref 4–15)
NEUTROPHILS # BLD AUTO: 1.4 K/UL (ref 1.8–7.7)
NEUTROPHILS NFR BLD: 37 % (ref 38–73)
NRBC BLD-RTO: 0 /100 WBC
PLATELET # BLD AUTO: 234 K/UL (ref 150–450)
PMV BLD AUTO: 10.3 FL (ref 9.2–12.9)
RBC # BLD AUTO: 4.87 M/UL (ref 4.6–6.2)
WBC # BLD AUTO: 3.83 K/UL (ref 3.9–12.7)

## 2021-09-23 PROCEDURE — 85025 COMPLETE CBC W/AUTO DIFF WBC: CPT | Performed by: INTERNAL MEDICINE

## 2021-09-23 PROCEDURE — 36415 COLL VENOUS BLD VENIPUNCTURE: CPT | Performed by: INTERNAL MEDICINE

## 2021-09-24 ENCOUNTER — OFFICE VISIT (OUTPATIENT)
Dept: HEMATOLOGY/ONCOLOGY | Facility: CLINIC | Age: 46
End: 2021-09-24
Payer: MEDICAID

## 2021-09-24 ENCOUNTER — INFUSION (OUTPATIENT)
Dept: INFUSION THERAPY | Facility: HOSPITAL | Age: 46
End: 2021-09-24
Attending: INTERNAL MEDICINE
Payer: MEDICAID

## 2021-09-24 VITALS
HEIGHT: 74 IN | WEIGHT: 315 LBS | OXYGEN SATURATION: 98 % | RESPIRATION RATE: 18 BRPM | DIASTOLIC BLOOD PRESSURE: 73 MMHG | BODY MASS INDEX: 40.43 KG/M2 | HEART RATE: 64 BPM | SYSTOLIC BLOOD PRESSURE: 129 MMHG

## 2021-09-24 VITALS
RESPIRATION RATE: 18 BRPM | DIASTOLIC BLOOD PRESSURE: 91 MMHG | HEART RATE: 62 BPM | SYSTOLIC BLOOD PRESSURE: 140 MMHG | OXYGEN SATURATION: 99 % | TEMPERATURE: 99 F

## 2021-09-24 DIAGNOSIS — C79.51 BONE METASTASES: ICD-10-CM

## 2021-09-24 DIAGNOSIS — Z17.1 MALIGNANT NEOPLASM INVOLVING BOTH NIPPLE AND AREOLA OF RIGHT BREAST IN MALE, ESTROGEN RECEPTOR NEGATIVE: Primary | ICD-10-CM

## 2021-09-24 DIAGNOSIS — C50.021 MALIGNANT NEOPLASM INVOLVING BOTH NIPPLE AND AREOLA OF RIGHT BREAST IN MALE, ESTROGEN RECEPTOR NEGATIVE: Primary | ICD-10-CM

## 2021-09-24 PROCEDURE — 99999 PR PBB SHADOW E&M-EST. PATIENT-LVL IV: CPT | Mod: PBBFAC,,, | Performed by: INTERNAL MEDICINE

## 2021-09-24 PROCEDURE — 99214 OFFICE O/P EST MOD 30 MIN: CPT | Mod: S$PBB,,, | Performed by: INTERNAL MEDICINE

## 2021-09-24 PROCEDURE — 99214 PR OFFICE/OUTPT VISIT, EST, LEVL IV, 30-39 MIN: ICD-10-PCS | Mod: S$PBB,,, | Performed by: INTERNAL MEDICINE

## 2021-09-24 PROCEDURE — 25000003 PHARM REV CODE 250: Performed by: INTERNAL MEDICINE

## 2021-09-24 PROCEDURE — A4216 STERILE WATER/SALINE, 10 ML: HCPCS | Performed by: INTERNAL MEDICINE

## 2021-09-24 PROCEDURE — 99999 PR PBB SHADOW E&M-EST. PATIENT-LVL IV: ICD-10-PCS | Mod: PBBFAC,,, | Performed by: INTERNAL MEDICINE

## 2021-09-24 PROCEDURE — 96413 CHEMO IV INFUSION 1 HR: CPT

## 2021-09-24 PROCEDURE — 63600175 PHARM REV CODE 636 W HCPCS: Mod: JG | Performed by: INTERNAL MEDICINE

## 2021-09-24 PROCEDURE — 99214 OFFICE O/P EST MOD 30 MIN: CPT | Mod: PBBFAC | Performed by: INTERNAL MEDICINE

## 2021-09-24 RX ORDER — HEPARIN 100 UNIT/ML
500 SYRINGE INTRAVENOUS
Status: DISCONTINUED | OUTPATIENT
Start: 2021-09-24 | End: 2021-09-24 | Stop reason: HOSPADM

## 2021-09-24 RX ORDER — LIDOCAINE AND PRILOCAINE 25; 25 MG/G; MG/G
CREAM TOPICAL
Qty: 30 G | Refills: 0 | Status: SHIPPED | OUTPATIENT
Start: 2021-09-24 | End: 2023-02-09

## 2021-09-24 RX ORDER — SODIUM CHLORIDE 0.9 % (FLUSH) 0.9 %
10 SYRINGE (ML) INJECTION
Status: DISCONTINUED | OUTPATIENT
Start: 2021-09-24 | End: 2021-09-24 | Stop reason: HOSPADM

## 2021-09-24 RX ADMIN — SODIUM CHLORIDE: 9 INJECTION, SOLUTION INTRAVENOUS at 03:09

## 2021-09-24 RX ADMIN — PACLITAXEL 300 MG: 100 INJECTION, POWDER, LYOPHILIZED, FOR SUSPENSION INTRAVENOUS at 03:09

## 2021-09-24 RX ADMIN — Medication 10 ML: at 04:09

## 2021-09-24 RX ADMIN — HEPARIN 500 UNITS: 100 SYRINGE at 04:09

## 2021-09-30 ENCOUNTER — DOCUMENTATION ONLY (OUTPATIENT)
Dept: HEMATOLOGY/ONCOLOGY | Facility: CLINIC | Age: 46
End: 2021-09-30

## 2021-10-01 DIAGNOSIS — C50.021 MALIGNANT NEOPLASM INVOLVING BOTH NIPPLE AND AREOLA OF RIGHT BREAST IN MALE, ESTROGEN RECEPTOR NEGATIVE: ICD-10-CM

## 2021-10-01 DIAGNOSIS — Z17.1 MALIGNANT NEOPLASM INVOLVING BOTH NIPPLE AND AREOLA OF RIGHT BREAST IN MALE, ESTROGEN RECEPTOR NEGATIVE: ICD-10-CM

## 2021-10-01 RX ORDER — HYDROCODONE BITARTRATE AND ACETAMINOPHEN 5; 325 MG/1; MG/1
1 TABLET ORAL EVERY 6 HOURS PRN
Qty: 60 TABLET | Refills: 0 | Status: SHIPPED | OUTPATIENT
Start: 2021-10-01 | End: 2021-10-08 | Stop reason: SDUPTHER

## 2021-10-05 ENCOUNTER — HOSPITAL ENCOUNTER (OUTPATIENT)
Dept: RADIOLOGY | Facility: HOSPITAL | Age: 46
Discharge: HOME | End: 2021-10-05
Attending: INTERNAL MEDICINE
Payer: MEDICARE

## 2021-10-05 DIAGNOSIS — C50.021 MALIGNANT NEOPLASM INVOLVING BOTH NIPPLE AND AREOLA OF RIGHT BREAST IN MALE, ESTROGEN RECEPTOR NEGATIVE: ICD-10-CM

## 2021-10-05 DIAGNOSIS — C79.51 BONE METASTASES: ICD-10-CM

## 2021-10-05 DIAGNOSIS — Z17.1 MALIGNANT NEOPLASM INVOLVING BOTH NIPPLE AND AREOLA OF RIGHT BREAST IN MALE, ESTROGEN RECEPTOR NEGATIVE: ICD-10-CM

## 2021-10-05 LAB — POCT GLUCOSE: 140 MG/DL (ref 70–110)

## 2021-10-05 PROCEDURE — 78815 PET IMAGE W/CT SKULL-THIGH: CPT | Mod: 26,PS,, | Performed by: RADIOLOGY

## 2021-10-05 PROCEDURE — 78815 NM PET CT ROUTINE: ICD-10-PCS | Mod: 26,PS,, | Performed by: RADIOLOGY

## 2021-10-05 PROCEDURE — G1004 CDSM NDSC: HCPCS

## 2021-10-05 PROCEDURE — A9698 NON-RAD CONTRAST MATERIALNOC: HCPCS | Performed by: INTERNAL MEDICINE

## 2021-10-05 PROCEDURE — 25500020 PHARM REV CODE 255: Performed by: INTERNAL MEDICINE

## 2021-10-05 RX ADMIN — IOHEXOL 1000 ML: 9 SOLUTION ORAL at 12:10

## 2021-10-06 ENCOUNTER — PATIENT MESSAGE (OUTPATIENT)
Dept: SLEEP MEDICINE | Facility: CLINIC | Age: 46
End: 2021-10-06

## 2021-10-07 ENCOUNTER — TELEPHONE (OUTPATIENT)
Dept: SLEEP MEDICINE | Facility: CLINIC | Age: 46
End: 2021-10-07

## 2021-10-08 ENCOUNTER — TELEPHONE (OUTPATIENT)
Dept: PALLIATIVE MEDICINE | Facility: CLINIC | Age: 46
End: 2021-10-08

## 2021-10-08 ENCOUNTER — OFFICE VISIT (OUTPATIENT)
Dept: SLEEP MEDICINE | Facility: CLINIC | Age: 46
End: 2021-10-08
Payer: MEDICARE

## 2021-10-08 ENCOUNTER — INFUSION (OUTPATIENT)
Dept: INFUSION THERAPY | Facility: HOSPITAL | Age: 46
End: 2021-10-08
Payer: MEDICARE

## 2021-10-08 VITALS
HEIGHT: 73 IN | TEMPERATURE: 98 F | DIASTOLIC BLOOD PRESSURE: 69 MMHG | RESPIRATION RATE: 19 BRPM | WEIGHT: 315 LBS | HEART RATE: 79 BPM | SYSTOLIC BLOOD PRESSURE: 123 MMHG | OXYGEN SATURATION: 99 % | BODY MASS INDEX: 41.75 KG/M2

## 2021-10-08 DIAGNOSIS — E11.65 UNCONTROLLED TYPE 2 DIABETES MELLITUS WITH HYPERGLYCEMIA: ICD-10-CM

## 2021-10-08 DIAGNOSIS — Z17.1 MALIGNANT NEOPLASM INVOLVING BOTH NIPPLE AND AREOLA OF RIGHT BREAST IN MALE, ESTROGEN RECEPTOR NEGATIVE: ICD-10-CM

## 2021-10-08 DIAGNOSIS — C50.021 MALIGNANT NEOPLASM INVOLVING BOTH NIPPLE AND AREOLA OF RIGHT BREAST IN MALE, ESTROGEN RECEPTOR NEGATIVE: Primary | ICD-10-CM

## 2021-10-08 DIAGNOSIS — G47.33 OSA (OBSTRUCTIVE SLEEP APNEA): Primary | ICD-10-CM

## 2021-10-08 DIAGNOSIS — Z17.1 MALIGNANT NEOPLASM INVOLVING BOTH NIPPLE AND AREOLA OF RIGHT BREAST IN MALE, ESTROGEN RECEPTOR NEGATIVE: Primary | ICD-10-CM

## 2021-10-08 DIAGNOSIS — C50.021 MALIGNANT NEOPLASM INVOLVING BOTH NIPPLE AND AREOLA OF RIGHT BREAST IN MALE, ESTROGEN RECEPTOR NEGATIVE: ICD-10-CM

## 2021-10-08 PROCEDURE — 99212 OFFICE O/P EST SF 10 MIN: CPT | Mod: 95,,, | Performed by: INTERNAL MEDICINE

## 2021-10-08 PROCEDURE — 96413 CHEMO IV INFUSION 1 HR: CPT

## 2021-10-08 PROCEDURE — 25000003 PHARM REV CODE 250: Performed by: INTERNAL MEDICINE

## 2021-10-08 PROCEDURE — 63600175 PHARM REV CODE 636 W HCPCS: Mod: JG | Performed by: INTERNAL MEDICINE

## 2021-10-08 PROCEDURE — 99212 PR OFFICE/OUTPT VISIT, EST, LEVL II, 10-19 MIN: ICD-10-PCS | Mod: 95,,, | Performed by: INTERNAL MEDICINE

## 2021-10-08 RX ORDER — HEPARIN 100 UNIT/ML
500 SYRINGE INTRAVENOUS
Status: DISCONTINUED | OUTPATIENT
Start: 2021-10-08 | End: 2021-10-08 | Stop reason: HOSPADM

## 2021-10-08 RX ORDER — HYDROCODONE BITARTRATE AND ACETAMINOPHEN 5; 325 MG/1; MG/1
1 TABLET ORAL EVERY 6 HOURS PRN
Qty: 60 TABLET | Refills: 0 | Status: SHIPPED | OUTPATIENT
Start: 2021-10-08 | End: 2021-10-25 | Stop reason: SDUPTHER

## 2021-10-08 RX ORDER — SODIUM CHLORIDE 0.9 % (FLUSH) 0.9 %
10 SYRINGE (ML) INJECTION
Status: CANCELLED | OUTPATIENT
Start: 2021-10-08

## 2021-10-08 RX ORDER — HEPARIN 100 UNIT/ML
500 SYRINGE INTRAVENOUS
Status: CANCELLED | OUTPATIENT
Start: 2021-10-08

## 2021-10-08 RX ORDER — SODIUM CHLORIDE 0.9 % (FLUSH) 0.9 %
10 SYRINGE (ML) INJECTION
Status: DISCONTINUED | OUTPATIENT
Start: 2021-10-08 | End: 2021-10-08 | Stop reason: HOSPADM

## 2021-10-08 RX ADMIN — SODIUM CHLORIDE: 9 INJECTION, SOLUTION INTRAVENOUS at 01:10

## 2021-10-08 RX ADMIN — HEPARIN SODIUM (PORCINE) LOCK FLUSH IV SOLN 100 UNIT/ML 500 UNITS: 100 SOLUTION at 04:10

## 2021-10-08 RX ADMIN — PACLITAXEL 300 MG: 100 INJECTION, POWDER, LYOPHILIZED, FOR SUSPENSION INTRAVENOUS at 03:10

## 2021-10-11 ENCOUNTER — OFFICE VISIT (OUTPATIENT)
Dept: PALLIATIVE MEDICINE | Facility: CLINIC | Age: 46
End: 2021-10-11
Payer: MEDICARE

## 2021-10-11 ENCOUNTER — TELEPHONE (OUTPATIENT)
Dept: HEMATOLOGY/ONCOLOGY | Facility: CLINIC | Age: 46
End: 2021-10-11

## 2021-10-11 VITALS — DIASTOLIC BLOOD PRESSURE: 72 MMHG | HEART RATE: 85 BPM | SYSTOLIC BLOOD PRESSURE: 132 MMHG

## 2021-10-11 DIAGNOSIS — R19.7 DIARRHEA, UNSPECIFIED TYPE: ICD-10-CM

## 2021-10-11 DIAGNOSIS — Z51.5 ENCOUNTER FOR PALLIATIVE CARE: ICD-10-CM

## 2021-10-11 DIAGNOSIS — F43.23 ADJUSTMENT DISORDER WITH MIXED ANXIETY AND DEPRESSED MOOD: ICD-10-CM

## 2021-10-11 DIAGNOSIS — C50.021 MALIGNANT NEOPLASM INVOLVING BOTH NIPPLE AND AREOLA OF RIGHT BREAST IN MALE, ESTROGEN RECEPTOR NEGATIVE: Primary | ICD-10-CM

## 2021-10-11 DIAGNOSIS — M79.2 NEUROPATHIC PAIN: ICD-10-CM

## 2021-10-11 DIAGNOSIS — C79.51 BONE METASTASES: ICD-10-CM

## 2021-10-11 DIAGNOSIS — G89.3 CANCER RELATED PAIN: ICD-10-CM

## 2021-10-11 DIAGNOSIS — G47.00 INSOMNIA, UNSPECIFIED TYPE: ICD-10-CM

## 2021-10-11 DIAGNOSIS — Z17.1 MALIGNANT NEOPLASM INVOLVING BOTH NIPPLE AND AREOLA OF RIGHT BREAST IN MALE, ESTROGEN RECEPTOR NEGATIVE: Primary | ICD-10-CM

## 2021-10-11 DIAGNOSIS — Z71.89 ADVANCED CARE PLANNING/COUNSELING DISCUSSION: ICD-10-CM

## 2021-10-11 PROCEDURE — 99214 OFFICE O/P EST MOD 30 MIN: CPT | Mod: PBBFAC | Performed by: STUDENT IN AN ORGANIZED HEALTH CARE EDUCATION/TRAINING PROGRAM

## 2021-10-11 PROCEDURE — 99999 PR PBB SHADOW E&M-EST. PATIENT-LVL IV: CPT | Mod: PBBFAC,,, | Performed by: STUDENT IN AN ORGANIZED HEALTH CARE EDUCATION/TRAINING PROGRAM

## 2021-10-11 PROCEDURE — 99205 OFFICE O/P NEW HI 60 MIN: CPT | Mod: S$GLB,,, | Performed by: STUDENT IN AN ORGANIZED HEALTH CARE EDUCATION/TRAINING PROGRAM

## 2021-10-11 PROCEDURE — 99205 PR OFFICE/OUTPT VISIT, NEW, LEVL V, 60-74 MIN: ICD-10-PCS | Mod: S$GLB,,, | Performed by: STUDENT IN AN ORGANIZED HEALTH CARE EDUCATION/TRAINING PROGRAM

## 2021-10-11 PROCEDURE — 99999 PR PBB SHADOW E&M-EST. PATIENT-LVL IV: ICD-10-PCS | Mod: PBBFAC,,, | Performed by: STUDENT IN AN ORGANIZED HEALTH CARE EDUCATION/TRAINING PROGRAM

## 2021-10-14 ENCOUNTER — DOCUMENTATION ONLY (OUTPATIENT)
Dept: HEMATOLOGY/ONCOLOGY | Facility: CLINIC | Age: 46
End: 2021-10-14

## 2021-10-15 ENCOUNTER — DOCUMENTATION ONLY (OUTPATIENT)
Dept: HEMATOLOGY/ONCOLOGY | Facility: CLINIC | Age: 46
End: 2021-10-15

## 2021-10-18 ENCOUNTER — PATIENT MESSAGE (OUTPATIENT)
Dept: HEMATOLOGY/ONCOLOGY | Facility: CLINIC | Age: 46
End: 2021-10-18

## 2021-10-19 ENCOUNTER — DOCUMENTATION ONLY (OUTPATIENT)
Dept: HEMATOLOGY/ONCOLOGY | Facility: CLINIC | Age: 46
End: 2021-10-19

## 2021-10-19 ENCOUNTER — PATIENT MESSAGE (OUTPATIENT)
Dept: HEMATOLOGY/ONCOLOGY | Facility: CLINIC | Age: 46
End: 2021-10-19

## 2021-10-22 ENCOUNTER — INFUSION (OUTPATIENT)
Dept: INFUSION THERAPY | Facility: HOSPITAL | Age: 46
End: 2021-10-22
Payer: MEDICARE

## 2021-10-22 ENCOUNTER — OFFICE VISIT (OUTPATIENT)
Dept: HEMATOLOGY/ONCOLOGY | Facility: CLINIC | Age: 46
End: 2021-10-22
Payer: MEDICARE

## 2021-10-22 VITALS
DIASTOLIC BLOOD PRESSURE: 75 MMHG | OXYGEN SATURATION: 99 % | SYSTOLIC BLOOD PRESSURE: 135 MMHG | BODY MASS INDEX: 40.43 KG/M2 | HEART RATE: 60 BPM | TEMPERATURE: 98 F | RESPIRATION RATE: 16 BRPM | HEIGHT: 74 IN | WEIGHT: 315 LBS

## 2021-10-22 VITALS
OXYGEN SATURATION: 97 % | BODY MASS INDEX: 40.43 KG/M2 | HEART RATE: 64 BPM | WEIGHT: 315 LBS | DIASTOLIC BLOOD PRESSURE: 68 MMHG | HEIGHT: 74 IN | SYSTOLIC BLOOD PRESSURE: 128 MMHG | RESPIRATION RATE: 16 BRPM | TEMPERATURE: 98 F

## 2021-10-22 DIAGNOSIS — F43.23 ADJUSTMENT DISORDER WITH MIXED ANXIETY AND DEPRESSED MOOD: ICD-10-CM

## 2021-10-22 DIAGNOSIS — Z17.1 MALIGNANT NEOPLASM INVOLVING BOTH NIPPLE AND AREOLA OF RIGHT BREAST IN MALE, ESTROGEN RECEPTOR NEGATIVE: Primary | ICD-10-CM

## 2021-10-22 DIAGNOSIS — R79.89 ELEVATED SERUM CREATININE: ICD-10-CM

## 2021-10-22 DIAGNOSIS — C79.51 BONE METASTASES: ICD-10-CM

## 2021-10-22 DIAGNOSIS — C50.021 MALIGNANT NEOPLASM INVOLVING BOTH NIPPLE AND AREOLA OF RIGHT BREAST IN MALE, ESTROGEN RECEPTOR NEGATIVE: Primary | ICD-10-CM

## 2021-10-22 DIAGNOSIS — E80.6 HYPERBILIRUBINEMIA: ICD-10-CM

## 2021-10-22 DIAGNOSIS — E87.1 HYPONATREMIA: ICD-10-CM

## 2021-10-22 DIAGNOSIS — D50.9 MICROCYTIC ANEMIA: ICD-10-CM

## 2021-10-22 DIAGNOSIS — E11.65 UNCONTROLLED TYPE 2 DIABETES MELLITUS WITH HYPERGLYCEMIA: ICD-10-CM

## 2021-10-22 PROCEDURE — 63600175 PHARM REV CODE 636 W HCPCS: Mod: JG | Performed by: INTERNAL MEDICINE

## 2021-10-22 PROCEDURE — 96360 HYDRATION IV INFUSION INIT: CPT

## 2021-10-22 PROCEDURE — 99999 PR PBB SHADOW E&M-EST. PATIENT-LVL IV: ICD-10-PCS | Mod: PBBFAC,,, | Performed by: NURSE PRACTITIONER

## 2021-10-22 PROCEDURE — 96361 HYDRATE IV INFUSION ADD-ON: CPT

## 2021-10-22 PROCEDURE — 99215 OFFICE O/P EST HI 40 MIN: CPT | Mod: S$GLB,,, | Performed by: NURSE PRACTITIONER

## 2021-10-22 PROCEDURE — 96367 TX/PROPH/DG ADDL SEQ IV INF: CPT

## 2021-10-22 PROCEDURE — 99214 OFFICE O/P EST MOD 30 MIN: CPT | Mod: PBBFAC,25 | Performed by: NURSE PRACTITIONER

## 2021-10-22 PROCEDURE — 96413 CHEMO IV INFUSION 1 HR: CPT

## 2021-10-22 PROCEDURE — 25000003 PHARM REV CODE 250: Performed by: INTERNAL MEDICINE

## 2021-10-22 PROCEDURE — 99215 PR OFFICE/OUTPT VISIT, EST, LEVL V, 40-54 MIN: ICD-10-PCS | Mod: S$GLB,,, | Performed by: NURSE PRACTITIONER

## 2021-10-22 PROCEDURE — A4216 STERILE WATER/SALINE, 10 ML: HCPCS | Performed by: INTERNAL MEDICINE

## 2021-10-22 PROCEDURE — 99999 PR PBB SHADOW E&M-EST. PATIENT-LVL IV: CPT | Mod: PBBFAC,,, | Performed by: NURSE PRACTITIONER

## 2021-10-22 RX ORDER — HEPARIN 100 UNIT/ML
500 SYRINGE INTRAVENOUS
Status: DISCONTINUED | OUTPATIENT
Start: 2021-10-22 | End: 2021-10-22 | Stop reason: HOSPADM

## 2021-10-22 RX ORDER — HEPARIN 100 UNIT/ML
500 SYRINGE INTRAVENOUS
Status: CANCELLED | OUTPATIENT
Start: 2021-10-22

## 2021-10-22 RX ORDER — ZOLEDRONIC ACID 0.04 MG/ML
4 INJECTION, SOLUTION INTRAVENOUS
Status: COMPLETED | OUTPATIENT
Start: 2021-10-22 | End: 2021-10-22

## 2021-10-22 RX ORDER — SODIUM CHLORIDE 0.9 % (FLUSH) 0.9 %
10 SYRINGE (ML) INJECTION
Status: CANCELLED | OUTPATIENT
Start: 2021-11-05

## 2021-10-22 RX ORDER — FLUOXETINE HYDROCHLORIDE 20 MG/1
20 CAPSULE ORAL DAILY
Qty: 90 CAPSULE | Refills: 1 | Status: SHIPPED | OUTPATIENT
Start: 2021-10-22 | End: 2021-11-10 | Stop reason: SDUPTHER

## 2021-10-22 RX ORDER — SODIUM CHLORIDE 0.9 % (FLUSH) 0.9 %
10 SYRINGE (ML) INJECTION
Status: DISCONTINUED | OUTPATIENT
Start: 2021-10-22 | End: 2021-10-22 | Stop reason: HOSPADM

## 2021-10-22 RX ORDER — ZOLEDRONIC ACID 0.04 MG/ML
4 INJECTION, SOLUTION INTRAVENOUS
Status: CANCELLED | OUTPATIENT
Start: 2021-11-05

## 2021-10-22 RX ORDER — SODIUM CHLORIDE 0.9 % (FLUSH) 0.9 %
10 SYRINGE (ML) INJECTION
Status: CANCELLED | OUTPATIENT
Start: 2021-10-22

## 2021-10-22 RX ORDER — HEPARIN 100 UNIT/ML
500 SYRINGE INTRAVENOUS
Status: CANCELLED | OUTPATIENT
Start: 2021-11-05

## 2021-10-22 RX ADMIN — SODIUM CHLORIDE 1000 ML: 0.9 INJECTION, SOLUTION INTRAVENOUS at 03:10

## 2021-10-22 RX ADMIN — HEPARIN 500 UNITS: 100 SYRINGE at 04:10

## 2021-10-22 RX ADMIN — PACLITAXEL 300 MG: 100 INJECTION, POWDER, LYOPHILIZED, FOR SUSPENSION INTRAVENOUS at 04:10

## 2021-10-22 RX ADMIN — Medication 10 ML: at 04:10

## 2021-10-22 RX ADMIN — ZOLEDRONIC ACID 4 MG: 0.04 INJECTION, SOLUTION INTRAVENOUS at 03:10

## 2021-10-25 ENCOUNTER — PATIENT MESSAGE (OUTPATIENT)
Dept: HEMATOLOGY/ONCOLOGY | Facility: CLINIC | Age: 46
End: 2021-10-25
Payer: MEDICARE

## 2021-10-25 ENCOUNTER — TELEPHONE (OUTPATIENT)
Dept: HEMATOLOGY/ONCOLOGY | Facility: CLINIC | Age: 46
End: 2021-10-25
Payer: MEDICARE

## 2021-10-25 DIAGNOSIS — C50.021 MALIGNANT NEOPLASM INVOLVING BOTH NIPPLE AND AREOLA OF RIGHT BREAST IN MALE, ESTROGEN RECEPTOR NEGATIVE: ICD-10-CM

## 2021-10-25 DIAGNOSIS — Z17.1 MALIGNANT NEOPLASM INVOLVING BOTH NIPPLE AND AREOLA OF RIGHT BREAST IN MALE, ESTROGEN RECEPTOR NEGATIVE: ICD-10-CM

## 2021-10-26 ENCOUNTER — PATIENT MESSAGE (OUTPATIENT)
Dept: HEMATOLOGY/ONCOLOGY | Facility: CLINIC | Age: 46
End: 2021-10-26
Payer: MEDICARE

## 2021-10-26 ENCOUNTER — DOCUMENTATION ONLY (OUTPATIENT)
Dept: HEMATOLOGY/ONCOLOGY | Facility: CLINIC | Age: 46
End: 2021-10-26
Payer: MEDICARE

## 2021-10-26 RX ORDER — HYDROCODONE BITARTRATE AND ACETAMINOPHEN 5; 325 MG/1; MG/1
1 TABLET ORAL EVERY 6 HOURS PRN
Qty: 60 TABLET | Refills: 0 | Status: SHIPPED | OUTPATIENT
Start: 2021-10-26 | End: 2021-11-11

## 2021-10-27 ENCOUNTER — TELEPHONE (OUTPATIENT)
Dept: HEMATOLOGY/ONCOLOGY | Facility: CLINIC | Age: 46
End: 2021-10-27
Payer: MEDICARE

## 2021-10-28 ENCOUNTER — OFFICE VISIT (OUTPATIENT)
Dept: HEMATOLOGY/ONCOLOGY | Facility: CLINIC | Age: 46
End: 2021-10-28
Payer: MEDICARE

## 2021-10-28 VITALS
OXYGEN SATURATION: 95 % | SYSTOLIC BLOOD PRESSURE: 125 MMHG | BODY MASS INDEX: 40.43 KG/M2 | RESPIRATION RATE: 16 BRPM | TEMPERATURE: 98 F | WEIGHT: 315 LBS | HEART RATE: 71 BPM | HEIGHT: 74 IN | DIASTOLIC BLOOD PRESSURE: 67 MMHG

## 2021-10-28 DIAGNOSIS — Z17.1 MALIGNANT NEOPLASM INVOLVING BOTH NIPPLE AND AREOLA OF RIGHT BREAST IN MALE, ESTROGEN RECEPTOR NEGATIVE: ICD-10-CM

## 2021-10-28 DIAGNOSIS — Z13.9 ENCOUNTER FOR SCREENING: Primary | ICD-10-CM

## 2021-10-28 DIAGNOSIS — E11.65 UNCONTROLLED TYPE 2 DIABETES MELLITUS WITH HYPERGLYCEMIA: ICD-10-CM

## 2021-10-28 DIAGNOSIS — E66.01 MORBID OBESITY: ICD-10-CM

## 2021-10-28 DIAGNOSIS — C79.51 BONE METASTASES: ICD-10-CM

## 2021-10-28 DIAGNOSIS — C50.021 MALIGNANT NEOPLASM INVOLVING BOTH NIPPLE AND AREOLA OF RIGHT BREAST IN MALE, ESTROGEN RECEPTOR NEGATIVE: ICD-10-CM

## 2021-10-28 LAB — SARS-COV-2 RDRP RESP QL NAA+PROBE: NEGATIVE

## 2021-10-28 PROCEDURE — 99999 PR PBB SHADOW E&M-EST. PATIENT-LVL IV: ICD-10-PCS | Mod: PBBFAC,,, | Performed by: INTERNAL MEDICINE

## 2021-10-28 PROCEDURE — U0002 COVID-19 LAB TEST NON-CDC: HCPCS | Performed by: INTERNAL MEDICINE

## 2021-10-28 PROCEDURE — 99214 PR OFFICE/OUTPT VISIT, EST, LEVL IV, 30-39 MIN: ICD-10-PCS | Mod: S$GLB,,, | Performed by: INTERNAL MEDICINE

## 2021-10-28 PROCEDURE — 99999 PR PBB SHADOW E&M-EST. PATIENT-LVL IV: CPT | Mod: PBBFAC,,, | Performed by: INTERNAL MEDICINE

## 2021-10-28 PROCEDURE — 99214 OFFICE O/P EST MOD 30 MIN: CPT | Mod: S$GLB,,, | Performed by: INTERNAL MEDICINE

## 2021-10-28 PROCEDURE — 99214 OFFICE O/P EST MOD 30 MIN: CPT | Mod: PBBFAC | Performed by: INTERNAL MEDICINE

## 2021-10-28 RX ORDER — SODIUM CHLORIDE 0.9 % (FLUSH) 0.9 %
10 SYRINGE (ML) INJECTION
Status: CANCELLED | OUTPATIENT
Start: 2021-10-29

## 2021-10-28 RX ORDER — HEPARIN 100 UNIT/ML
500 SYRINGE INTRAVENOUS
Status: CANCELLED | OUTPATIENT
Start: 2021-10-29

## 2021-10-29 ENCOUNTER — PATIENT MESSAGE (OUTPATIENT)
Dept: PSYCHIATRY | Facility: CLINIC | Age: 46
End: 2021-10-29
Payer: MEDICARE

## 2021-10-29 ENCOUNTER — DOCUMENTATION ONLY (OUTPATIENT)
Dept: HEMATOLOGY/ONCOLOGY | Facility: CLINIC | Age: 46
End: 2021-10-29
Payer: MEDICARE

## 2021-10-29 ENCOUNTER — INFUSION (OUTPATIENT)
Dept: INFUSION THERAPY | Facility: HOSPITAL | Age: 46
End: 2021-10-29
Payer: MEDICARE

## 2021-10-29 VITALS
RESPIRATION RATE: 16 BRPM | BODY MASS INDEX: 40.43 KG/M2 | HEART RATE: 62 BPM | HEIGHT: 74 IN | SYSTOLIC BLOOD PRESSURE: 124 MMHG | DIASTOLIC BLOOD PRESSURE: 68 MMHG | TEMPERATURE: 98 F | WEIGHT: 315 LBS | OXYGEN SATURATION: 98 %

## 2021-10-29 DIAGNOSIS — R19.7 DIARRHEA, UNSPECIFIED: ICD-10-CM

## 2021-10-29 DIAGNOSIS — Z17.1 MALIGNANT NEOPLASM INVOLVING BOTH NIPPLE AND AREOLA OF RIGHT BREAST IN MALE, ESTROGEN RECEPTOR NEGATIVE: Primary | ICD-10-CM

## 2021-10-29 DIAGNOSIS — C50.021 MALIGNANT NEOPLASM INVOLVING BOTH NIPPLE AND AREOLA OF RIGHT BREAST IN MALE, ESTROGEN RECEPTOR NEGATIVE: Primary | ICD-10-CM

## 2021-10-29 PROCEDURE — 63600175 PHARM REV CODE 636 W HCPCS: Performed by: INTERNAL MEDICINE

## 2021-10-29 PROCEDURE — A4216 STERILE WATER/SALINE, 10 ML: HCPCS | Performed by: INTERNAL MEDICINE

## 2021-10-29 PROCEDURE — 25000003 PHARM REV CODE 250: Performed by: INTERNAL MEDICINE

## 2021-10-29 PROCEDURE — 96413 CHEMO IV INFUSION 1 HR: CPT

## 2021-10-29 PROCEDURE — 96360 HYDRATION IV INFUSION INIT: CPT

## 2021-10-29 PROCEDURE — 96361 HYDRATE IV INFUSION ADD-ON: CPT

## 2021-10-29 RX ORDER — HEPARIN 100 UNIT/ML
500 SYRINGE INTRAVENOUS
Status: DISCONTINUED | OUTPATIENT
Start: 2021-10-29 | End: 2021-10-29 | Stop reason: HOSPADM

## 2021-10-29 RX ORDER — SODIUM CHLORIDE 9 MG/ML
INJECTION, SOLUTION INTRAVENOUS CONTINUOUS
Status: DISCONTINUED | OUTPATIENT
Start: 2021-10-29 | End: 2021-10-29 | Stop reason: HOSPADM

## 2021-10-29 RX ORDER — SODIUM CHLORIDE 0.9 % (FLUSH) 0.9 %
10 SYRINGE (ML) INJECTION
Status: DISCONTINUED | OUTPATIENT
Start: 2021-10-29 | End: 2021-10-29 | Stop reason: HOSPADM

## 2021-10-29 RX ADMIN — PACLITAXEL 300 MG: 100 INJECTION, POWDER, LYOPHILIZED, FOR SUSPENSION INTRAVENOUS at 12:10

## 2021-10-29 RX ADMIN — SODIUM CHLORIDE: 0.9 INJECTION, SOLUTION INTRAVENOUS at 11:10

## 2021-10-29 RX ADMIN — HEPARIN 500 UNITS: 100 SYRINGE at 01:10

## 2021-10-29 RX ADMIN — Medication 10 ML: at 01:10

## 2021-11-04 ENCOUNTER — PATIENT MESSAGE (OUTPATIENT)
Dept: OTHER | Facility: OTHER | Age: 46
End: 2021-11-04
Payer: MEDICARE

## 2021-11-04 ENCOUNTER — OFFICE VISIT (OUTPATIENT)
Dept: PSYCHIATRY | Facility: CLINIC | Age: 46
End: 2021-11-04
Payer: MEDICAID

## 2021-11-04 DIAGNOSIS — F43.23 ADJUSTMENT DISORDER WITH MIXED ANXIETY AND DEPRESSED MOOD: Primary | ICD-10-CM

## 2021-11-04 PROCEDURE — 99211 OFF/OP EST MAY X REQ PHY/QHP: CPT | Mod: PBBFAC | Performed by: PSYCHOLOGIST

## 2021-11-04 PROCEDURE — 99999 PR PBB SHADOW E&M-EST. PATIENT-LVL I: CPT | Mod: PBBFAC,,, | Performed by: PSYCHOLOGIST

## 2021-11-04 PROCEDURE — 90834 PR PSYCHOTHERAPY W/PATIENT, 45 MIN: ICD-10-PCS | Mod: ,,, | Performed by: PSYCHOLOGIST

## 2021-11-04 PROCEDURE — 99999 PR PBB SHADOW E&M-EST. PATIENT-LVL I: ICD-10-PCS | Mod: PBBFAC,,, | Performed by: PSYCHOLOGIST

## 2021-11-04 PROCEDURE — 90834 PSYTX W PT 45 MINUTES: CPT | Mod: ,,, | Performed by: PSYCHOLOGIST

## 2021-11-05 ENCOUNTER — PATIENT MESSAGE (OUTPATIENT)
Dept: PALLIATIVE MEDICINE | Facility: CLINIC | Age: 46
End: 2021-11-05
Payer: MEDICARE

## 2021-11-05 ENCOUNTER — TELEPHONE (OUTPATIENT)
Dept: PALLIATIVE MEDICINE | Facility: CLINIC | Age: 46
End: 2021-11-05
Payer: MEDICARE

## 2021-11-05 DIAGNOSIS — R19.7 DIARRHEA, UNSPECIFIED TYPE: ICD-10-CM

## 2021-11-05 DIAGNOSIS — C50.021 MALIGNANT NEOPLASM INVOLVING BOTH NIPPLE AND AREOLA OF RIGHT BREAST IN MALE, ESTROGEN RECEPTOR NEGATIVE: Primary | ICD-10-CM

## 2021-11-05 DIAGNOSIS — Z17.1 MALIGNANT NEOPLASM INVOLVING BOTH NIPPLE AND AREOLA OF RIGHT BREAST IN MALE, ESTROGEN RECEPTOR NEGATIVE: Primary | ICD-10-CM

## 2021-11-05 RX ORDER — DIPHENOXYLATE HYDROCHLORIDE AND ATROPINE SULFATE 2.5; .025 MG/1; MG/1
1 TABLET ORAL 4 TIMES DAILY PRN
Qty: 40 TABLET | Refills: 0 | Status: SHIPPED | OUTPATIENT
Start: 2021-11-05 | End: 2021-12-29 | Stop reason: SDUPTHER

## 2021-11-10 ENCOUNTER — PATIENT MESSAGE (OUTPATIENT)
Dept: HEMATOLOGY/ONCOLOGY | Facility: CLINIC | Age: 46
End: 2021-11-10
Payer: MEDICARE

## 2021-11-10 DIAGNOSIS — C80.1 ANXIETY ASSOCIATED WITH CANCER DIAGNOSIS: ICD-10-CM

## 2021-11-10 DIAGNOSIS — F43.23 ADJUSTMENT DISORDER WITH MIXED ANXIETY AND DEPRESSED MOOD: ICD-10-CM

## 2021-11-10 DIAGNOSIS — F41.1 ANXIETY ASSOCIATED WITH CANCER DIAGNOSIS: ICD-10-CM

## 2021-11-10 RX ORDER — ALPRAZOLAM 0.5 MG/1
0.5 TABLET ORAL 3 TIMES DAILY PRN
Qty: 60 TABLET | Refills: 0 | Status: SHIPPED | OUTPATIENT
Start: 2021-11-10 | End: 2021-12-13 | Stop reason: SDUPTHER

## 2021-11-10 RX ORDER — FLUOXETINE HYDROCHLORIDE 20 MG/1
20 CAPSULE ORAL DAILY
Qty: 90 CAPSULE | Refills: 1 | Status: SHIPPED | OUTPATIENT
Start: 2021-11-10 | End: 2022-02-18

## 2021-11-11 ENCOUNTER — LAB VISIT (OUTPATIENT)
Dept: LAB | Facility: HOSPITAL | Age: 46
End: 2021-11-11
Attending: INTERNAL MEDICINE
Payer: MEDICARE

## 2021-11-11 ENCOUNTER — OFFICE VISIT (OUTPATIENT)
Dept: HEMATOLOGY/ONCOLOGY | Facility: CLINIC | Age: 46
End: 2021-11-11
Payer: MEDICARE

## 2021-11-11 VITALS
BODY MASS INDEX: 40.43 KG/M2 | HEIGHT: 74 IN | DIASTOLIC BLOOD PRESSURE: 78 MMHG | TEMPERATURE: 98 F | HEART RATE: 65 BPM | OXYGEN SATURATION: 97 % | RESPIRATION RATE: 18 BRPM | WEIGHT: 315 LBS | SYSTOLIC BLOOD PRESSURE: 139 MMHG

## 2021-11-11 DIAGNOSIS — Z17.1 MALIGNANT NEOPLASM INVOLVING BOTH NIPPLE AND AREOLA OF RIGHT BREAST IN MALE, ESTROGEN RECEPTOR NEGATIVE: ICD-10-CM

## 2021-11-11 DIAGNOSIS — T45.1X5A ANTINEOPLASTIC CHEMOTHERAPY INDUCED ANEMIA: ICD-10-CM

## 2021-11-11 DIAGNOSIS — C50.021 MALIGNANT NEOPLASM INVOLVING BOTH NIPPLE AND AREOLA OF RIGHT BREAST IN MALE, ESTROGEN RECEPTOR NEGATIVE: ICD-10-CM

## 2021-11-11 DIAGNOSIS — F43.23 ADJUSTMENT DISORDER WITH MIXED ANXIETY AND DEPRESSED MOOD: ICD-10-CM

## 2021-11-11 DIAGNOSIS — D64.81 ANTINEOPLASTIC CHEMOTHERAPY INDUCED ANEMIA: ICD-10-CM

## 2021-11-11 DIAGNOSIS — T45.1X5A CHEMOTHERAPY INDUCED NEUTROPENIA: ICD-10-CM

## 2021-11-11 DIAGNOSIS — G89.3 NEOPLASM RELATED PAIN: ICD-10-CM

## 2021-11-11 DIAGNOSIS — C50.021 MALIGNANT NEOPLASM INVOLVING BOTH NIPPLE AND AREOLA OF RIGHT BREAST IN MALE, ESTROGEN RECEPTOR NEGATIVE: Primary | ICD-10-CM

## 2021-11-11 DIAGNOSIS — Z17.1 MALIGNANT NEOPLASM INVOLVING BOTH NIPPLE AND AREOLA OF RIGHT BREAST IN MALE, ESTROGEN RECEPTOR NEGATIVE: Primary | ICD-10-CM

## 2021-11-11 DIAGNOSIS — D70.1 CHEMOTHERAPY INDUCED NEUTROPENIA: ICD-10-CM

## 2021-11-11 DIAGNOSIS — C79.51 BONE METASTASES: ICD-10-CM

## 2021-11-11 LAB
ALBUMIN SERPL BCP-MCNC: 3.9 G/DL (ref 3.5–5.2)
ALP SERPL-CCNC: 98 U/L (ref 55–135)
ALT SERPL W/O P-5'-P-CCNC: 19 U/L (ref 10–44)
ANION GAP SERPL CALC-SCNC: 8 MMOL/L (ref 8–16)
AST SERPL-CCNC: 12 U/L (ref 10–40)
BASOPHILS # BLD AUTO: 0.02 K/UL (ref 0–0.2)
BASOPHILS NFR BLD: 0.6 % (ref 0–1.9)
BILIRUB SERPL-MCNC: 0.8 MG/DL (ref 0.1–1)
BUN SERPL-MCNC: 12 MG/DL (ref 6–20)
CALCIUM SERPL-MCNC: 8.6 MG/DL (ref 8.7–10.5)
CHLORIDE SERPL-SCNC: 104 MMOL/L (ref 95–110)
CO2 SERPL-SCNC: 25 MMOL/L (ref 23–29)
CREAT SERPL-MCNC: 1.4 MG/DL (ref 0.5–1.4)
DIFFERENTIAL METHOD: ABNORMAL
EOSINOPHIL # BLD AUTO: 0 K/UL (ref 0–0.5)
EOSINOPHIL NFR BLD: 0.6 % (ref 0–8)
ERYTHROCYTE [DISTWIDTH] IN BLOOD BY AUTOMATED COUNT: 17.3 % (ref 11.5–14.5)
EST. GFR  (AFRICAN AMERICAN): >60 ML/MIN/1.73 M^2
EST. GFR  (NON AFRICAN AMERICAN): >60 ML/MIN/1.73 M^2
GLUCOSE SERPL-MCNC: 313 MG/DL (ref 70–110)
HCT VFR BLD AUTO: 36.2 % (ref 40–54)
HGB BLD-MCNC: 11.3 G/DL (ref 14–18)
IMM GRANULOCYTES # BLD AUTO: 0.02 K/UL (ref 0–0.04)
IMM GRANULOCYTES NFR BLD AUTO: 0.6 % (ref 0–0.5)
LYMPHOCYTES # BLD AUTO: 1.5 K/UL (ref 1–4.8)
LYMPHOCYTES NFR BLD: 42.1 % (ref 18–48)
MCH RBC QN AUTO: 25.7 PG (ref 27–31)
MCHC RBC AUTO-ENTMCNC: 31.2 G/DL (ref 32–36)
MCV RBC AUTO: 82 FL (ref 82–98)
MONOCYTES # BLD AUTO: 0.4 K/UL (ref 0.3–1)
MONOCYTES NFR BLD: 11 % (ref 4–15)
NEUTROPHILS # BLD AUTO: 1.6 K/UL (ref 1.8–7.7)
NEUTROPHILS NFR BLD: 45.1 % (ref 38–73)
NRBC BLD-RTO: 0 /100 WBC
PLATELET # BLD AUTO: 202 K/UL (ref 150–450)
PMV BLD AUTO: 10.4 FL (ref 9.2–12.9)
POTASSIUM SERPL-SCNC: 4.4 MMOL/L (ref 3.5–5.1)
PROT SERPL-MCNC: 7.3 G/DL (ref 6–8.4)
RBC # BLD AUTO: 4.4 M/UL (ref 4.6–6.2)
SODIUM SERPL-SCNC: 137 MMOL/L (ref 136–145)
WBC # BLD AUTO: 3.56 K/UL (ref 3.9–12.7)

## 2021-11-11 PROCEDURE — 85025 COMPLETE CBC W/AUTO DIFF WBC: CPT | Performed by: INTERNAL MEDICINE

## 2021-11-11 PROCEDURE — 99215 PR OFFICE/OUTPT VISIT, EST, LEVL V, 40-54 MIN: ICD-10-PCS | Mod: S$GLB,,, | Performed by: NURSE PRACTITIONER

## 2021-11-11 PROCEDURE — 99214 OFFICE O/P EST MOD 30 MIN: CPT | Mod: PBBFAC | Performed by: NURSE PRACTITIONER

## 2021-11-11 PROCEDURE — 99999 PR PBB SHADOW E&M-EST. PATIENT-LVL IV: CPT | Mod: PBBFAC,,, | Performed by: NURSE PRACTITIONER

## 2021-11-11 PROCEDURE — 36415 COLL VENOUS BLD VENIPUNCTURE: CPT | Performed by: INTERNAL MEDICINE

## 2021-11-11 PROCEDURE — 99499 RISK ADDL DX/OHS AUDIT: ICD-10-PCS | Mod: S$GLB,,, | Performed by: NURSE PRACTITIONER

## 2021-11-11 PROCEDURE — 99215 OFFICE O/P EST HI 40 MIN: CPT | Mod: S$GLB,,, | Performed by: NURSE PRACTITIONER

## 2021-11-11 PROCEDURE — 80053 COMPREHEN METABOLIC PANEL: CPT | Performed by: INTERNAL MEDICINE

## 2021-11-11 PROCEDURE — 99499 UNLISTED E&M SERVICE: CPT | Mod: S$GLB,,, | Performed by: NURSE PRACTITIONER

## 2021-11-11 PROCEDURE — 99999 PR PBB SHADOW E&M-EST. PATIENT-LVL IV: ICD-10-PCS | Mod: PBBFAC,,, | Performed by: NURSE PRACTITIONER

## 2021-11-11 RX ORDER — HYDROCODONE BITARTRATE AND ACETAMINOPHEN 10; 325 MG/1; MG/1
1 TABLET ORAL EVERY 6 HOURS PRN
Qty: 90 TABLET | Refills: 0 | Status: SHIPPED | OUTPATIENT
Start: 2021-11-11 | End: 2021-12-11 | Stop reason: SDUPTHER

## 2021-11-11 RX ORDER — SODIUM CHLORIDE 0.9 % (FLUSH) 0.9 %
10 SYRINGE (ML) INJECTION
Status: CANCELLED | OUTPATIENT
Start: 2021-11-19

## 2021-11-11 RX ORDER — HEPARIN 100 UNIT/ML
500 SYRINGE INTRAVENOUS
Status: CANCELLED | OUTPATIENT
Start: 2021-11-12

## 2021-11-11 RX ORDER — HYDROCODONE BITARTRATE AND ACETAMINOPHEN 5; 325 MG/1; MG/1
1 TABLET ORAL EVERY 6 HOURS PRN
Qty: 60 TABLET | Refills: 0 | OUTPATIENT
Start: 2021-11-11

## 2021-11-11 RX ORDER — HEPARIN 100 UNIT/ML
500 SYRINGE INTRAVENOUS
Status: CANCELLED | OUTPATIENT
Start: 2021-11-26

## 2021-11-11 RX ORDER — FLUOXETINE HYDROCHLORIDE 20 MG/1
20 CAPSULE ORAL DAILY
Qty: 90 CAPSULE | Refills: 1 | OUTPATIENT
Start: 2021-11-11 | End: 2022-11-11

## 2021-11-11 RX ORDER — SODIUM CHLORIDE 0.9 % (FLUSH) 0.9 %
10 SYRINGE (ML) INJECTION
Status: CANCELLED | OUTPATIENT
Start: 2021-11-26

## 2021-11-11 RX ORDER — SODIUM CHLORIDE 0.9 % (FLUSH) 0.9 %
10 SYRINGE (ML) INJECTION
Status: CANCELLED | OUTPATIENT
Start: 2021-11-12

## 2021-11-11 RX ORDER — HEPARIN 100 UNIT/ML
500 SYRINGE INTRAVENOUS
Status: CANCELLED | OUTPATIENT
Start: 2021-11-19

## 2021-11-12 ENCOUNTER — INFUSION (OUTPATIENT)
Dept: INFUSION THERAPY | Facility: HOSPITAL | Age: 46
End: 2021-11-12
Payer: MEDICARE

## 2021-11-12 VITALS
HEART RATE: 72 BPM | RESPIRATION RATE: 18 BRPM | TEMPERATURE: 98 F | DIASTOLIC BLOOD PRESSURE: 78 MMHG | SYSTOLIC BLOOD PRESSURE: 142 MMHG | OXYGEN SATURATION: 99 %

## 2021-11-12 DIAGNOSIS — Z17.1 MALIGNANT NEOPLASM INVOLVING BOTH NIPPLE AND AREOLA OF RIGHT BREAST IN MALE, ESTROGEN RECEPTOR NEGATIVE: Primary | ICD-10-CM

## 2021-11-12 DIAGNOSIS — C50.021 MALIGNANT NEOPLASM INVOLVING BOTH NIPPLE AND AREOLA OF RIGHT BREAST IN MALE, ESTROGEN RECEPTOR NEGATIVE: Primary | ICD-10-CM

## 2021-11-12 PROCEDURE — 96413 CHEMO IV INFUSION 1 HR: CPT

## 2021-11-12 PROCEDURE — 25000003 PHARM REV CODE 250: Performed by: INTERNAL MEDICINE

## 2021-11-12 PROCEDURE — 63600175 PHARM REV CODE 636 W HCPCS: Performed by: INTERNAL MEDICINE

## 2021-11-12 RX ORDER — SODIUM CHLORIDE 0.9 % (FLUSH) 0.9 %
10 SYRINGE (ML) INJECTION
Status: DISCONTINUED | OUTPATIENT
Start: 2021-11-12 | End: 2021-11-12 | Stop reason: HOSPADM

## 2021-11-12 RX ORDER — HEPARIN 100 UNIT/ML
500 SYRINGE INTRAVENOUS
Status: DISCONTINUED | OUTPATIENT
Start: 2021-11-12 | End: 2021-11-12 | Stop reason: HOSPADM

## 2021-11-12 RX ADMIN — PACLITAXEL 300 MG: 100 INJECTION, POWDER, LYOPHILIZED, FOR SUSPENSION INTRAVENOUS at 05:11

## 2021-11-12 RX ADMIN — HEPARIN 500 UNITS: 100 SYRINGE at 06:11

## 2021-11-12 RX ADMIN — SODIUM CHLORIDE: 9 INJECTION, SOLUTION INTRAVENOUS at 05:11

## 2021-11-19 ENCOUNTER — OFFICE VISIT (OUTPATIENT)
Dept: HEMATOLOGY/ONCOLOGY | Facility: CLINIC | Age: 46
End: 2021-11-19
Payer: MEDICARE

## 2021-11-19 ENCOUNTER — INFUSION (OUTPATIENT)
Dept: INFUSION THERAPY | Facility: HOSPITAL | Age: 46
End: 2021-11-19
Payer: MEDICARE

## 2021-11-19 VITALS
WEIGHT: 315 LBS | DIASTOLIC BLOOD PRESSURE: 73 MMHG | HEIGHT: 74 IN | HEART RATE: 66 BPM | SYSTOLIC BLOOD PRESSURE: 139 MMHG | RESPIRATION RATE: 16 BRPM | TEMPERATURE: 98 F | OXYGEN SATURATION: 96 % | BODY MASS INDEX: 40.43 KG/M2

## 2021-11-19 VITALS — DIASTOLIC BLOOD PRESSURE: 79 MMHG | HEART RATE: 70 BPM | SYSTOLIC BLOOD PRESSURE: 134 MMHG

## 2021-11-19 DIAGNOSIS — C79.51 BONE METASTASES: ICD-10-CM

## 2021-11-19 DIAGNOSIS — C50.021 MALIGNANT NEOPLASM INVOLVING BOTH NIPPLE AND AREOLA OF RIGHT BREAST IN MALE, ESTROGEN RECEPTOR NEGATIVE: Primary | ICD-10-CM

## 2021-11-19 DIAGNOSIS — E11.65 UNCONTROLLED TYPE 2 DIABETES MELLITUS WITH HYPERGLYCEMIA: ICD-10-CM

## 2021-11-19 DIAGNOSIS — Z17.1 MALIGNANT NEOPLASM INVOLVING BOTH NIPPLE AND AREOLA OF RIGHT BREAST IN MALE, ESTROGEN RECEPTOR NEGATIVE: Primary | ICD-10-CM

## 2021-11-19 PROCEDURE — 99499 UNLISTED E&M SERVICE: CPT | Mod: S$GLB,,, | Performed by: INTERNAL MEDICINE

## 2021-11-19 PROCEDURE — 99999 PR PBB SHADOW E&M-EST. PATIENT-LVL V: ICD-10-PCS | Mod: PBBFAC,,, | Performed by: INTERNAL MEDICINE

## 2021-11-19 PROCEDURE — 96375 TX/PRO/DX INJ NEW DRUG ADDON: CPT

## 2021-11-19 PROCEDURE — 96413 CHEMO IV INFUSION 1 HR: CPT

## 2021-11-19 PROCEDURE — 99999 PR PBB SHADOW E&M-EST. PATIENT-LVL V: CPT | Mod: PBBFAC,,, | Performed by: INTERNAL MEDICINE

## 2021-11-19 PROCEDURE — 63600175 PHARM REV CODE 636 W HCPCS: Performed by: INTERNAL MEDICINE

## 2021-11-19 PROCEDURE — 25000003 PHARM REV CODE 250: Performed by: INTERNAL MEDICINE

## 2021-11-19 PROCEDURE — 99214 OFFICE O/P EST MOD 30 MIN: CPT | Mod: S$GLB,,, | Performed by: INTERNAL MEDICINE

## 2021-11-19 PROCEDURE — 99499 RISK ADDL DX/OHS AUDIT: ICD-10-PCS | Mod: S$GLB,,, | Performed by: INTERNAL MEDICINE

## 2021-11-19 PROCEDURE — 99214 PR OFFICE/OUTPT VISIT, EST, LEVL IV, 30-39 MIN: ICD-10-PCS | Mod: S$GLB,,, | Performed by: INTERNAL MEDICINE

## 2021-11-19 PROCEDURE — 99215 OFFICE O/P EST HI 40 MIN: CPT | Mod: PBBFAC | Performed by: INTERNAL MEDICINE

## 2021-11-19 PROCEDURE — A4216 STERILE WATER/SALINE, 10 ML: HCPCS | Performed by: INTERNAL MEDICINE

## 2021-11-19 RX ORDER — SODIUM CHLORIDE 9 MG/ML
INJECTION, SOLUTION INTRAVENOUS CONTINUOUS
Status: CANCELLED
Start: 2021-11-19

## 2021-11-19 RX ORDER — HEPARIN 100 UNIT/ML
500 SYRINGE INTRAVENOUS
Status: CANCELLED | OUTPATIENT
Start: 2021-12-17

## 2021-11-19 RX ORDER — SODIUM CHLORIDE 9 MG/ML
INJECTION, SOLUTION INTRAVENOUS CONTINUOUS
Status: DISCONTINUED | OUTPATIENT
Start: 2021-11-19 | End: 2023-04-11

## 2021-11-19 RX ORDER — HEPARIN 100 UNIT/ML
500 SYRINGE INTRAVENOUS
Status: DISCONTINUED | OUTPATIENT
Start: 2021-11-19 | End: 2023-04-11

## 2021-11-19 RX ORDER — SODIUM CHLORIDE 0.9 % (FLUSH) 0.9 %
10 SYRINGE (ML) INJECTION
Status: CANCELLED | OUTPATIENT
Start: 2021-12-17

## 2021-11-19 RX ORDER — SODIUM CHLORIDE 0.9 % (FLUSH) 0.9 %
10 SYRINGE (ML) INJECTION
Status: DISCONTINUED | OUTPATIENT
Start: 2021-11-19 | End: 2024-02-14

## 2021-11-19 RX ORDER — HEPARIN 100 UNIT/ML
500 SYRINGE INTRAVENOUS
Status: DISCONTINUED | OUTPATIENT
Start: 2021-11-19 | End: 2024-02-14

## 2021-11-19 RX ORDER — SODIUM CHLORIDE 0.9 % (FLUSH) 0.9 %
10 SYRINGE (ML) INJECTION
Status: DISCONTINUED | OUTPATIENT
Start: 2021-11-19 | End: 2023-04-11

## 2021-11-19 RX ORDER — ZOLEDRONIC ACID 0.04 MG/ML
4 INJECTION, SOLUTION INTRAVENOUS
Status: COMPLETED | OUTPATIENT
Start: 2021-11-19 | End: 2021-11-19

## 2021-11-19 RX ORDER — ZOLEDRONIC ACID 0.04 MG/ML
4 INJECTION, SOLUTION INTRAVENOUS
Status: CANCELLED | OUTPATIENT
Start: 2021-12-17

## 2021-11-19 RX ADMIN — SODIUM CHLORIDE: 9 INJECTION, SOLUTION INTRAVENOUS at 03:11

## 2021-11-19 RX ADMIN — ZOLEDRONIC ACID 4 MG: 0.04 INJECTION, SOLUTION INTRAVENOUS at 03:11

## 2021-11-19 RX ADMIN — SODIUM CHLORIDE: 0.9 INJECTION, SOLUTION INTRAVENOUS at 03:11

## 2021-11-19 RX ADMIN — HEPARIN 500 UNITS: 100 SYRINGE at 04:11

## 2021-11-19 RX ADMIN — PACLITAXEL 300 MG: 100 INJECTION, POWDER, LYOPHILIZED, FOR SUSPENSION INTRAVENOUS at 03:11

## 2021-11-19 RX ADMIN — Medication 10 ML: at 03:11

## 2021-11-19 RX ADMIN — Medication 10 ML: at 04:11

## 2021-11-22 ENCOUNTER — TELEPHONE (OUTPATIENT)
Dept: INFUSION THERAPY | Facility: HOSPITAL | Age: 46
End: 2021-11-22
Payer: MEDICARE

## 2021-11-22 ENCOUNTER — TELEPHONE (OUTPATIENT)
Dept: PSYCHIATRY | Facility: CLINIC | Age: 46
End: 2021-11-22
Payer: MEDICARE

## 2021-11-22 DIAGNOSIS — Z17.1 MALIGNANT NEOPLASM INVOLVING BOTH NIPPLE AND AREOLA OF RIGHT BREAST IN MALE, ESTROGEN RECEPTOR NEGATIVE: Primary | ICD-10-CM

## 2021-11-22 DIAGNOSIS — C50.021 MALIGNANT NEOPLASM INVOLVING BOTH NIPPLE AND AREOLA OF RIGHT BREAST IN MALE, ESTROGEN RECEPTOR NEGATIVE: Primary | ICD-10-CM

## 2021-11-26 ENCOUNTER — INFUSION (OUTPATIENT)
Dept: INFUSION THERAPY | Facility: HOSPITAL | Age: 46
End: 2021-11-26
Payer: MEDICARE

## 2021-11-26 VITALS
OXYGEN SATURATION: 99 % | DIASTOLIC BLOOD PRESSURE: 59 MMHG | HEIGHT: 74 IN | HEART RATE: 77 BPM | SYSTOLIC BLOOD PRESSURE: 127 MMHG | TEMPERATURE: 98 F | BODY MASS INDEX: 40.43 KG/M2 | WEIGHT: 315 LBS | RESPIRATION RATE: 16 BRPM

## 2021-11-26 DIAGNOSIS — C50.021 MALIGNANT NEOPLASM INVOLVING BOTH NIPPLE AND AREOLA OF RIGHT BREAST IN MALE, ESTROGEN RECEPTOR NEGATIVE: Primary | ICD-10-CM

## 2021-11-26 DIAGNOSIS — Z17.1 MALIGNANT NEOPLASM INVOLVING BOTH NIPPLE AND AREOLA OF RIGHT BREAST IN MALE, ESTROGEN RECEPTOR NEGATIVE: Primary | ICD-10-CM

## 2021-11-26 PROCEDURE — 96413 CHEMO IV INFUSION 1 HR: CPT

## 2021-11-26 PROCEDURE — 25000003 PHARM REV CODE 250: Performed by: INTERNAL MEDICINE

## 2021-11-26 PROCEDURE — A4216 STERILE WATER/SALINE, 10 ML: HCPCS | Performed by: INTERNAL MEDICINE

## 2021-11-26 PROCEDURE — 63600175 PHARM REV CODE 636 W HCPCS: Performed by: INTERNAL MEDICINE

## 2021-11-26 RX ORDER — HEPARIN 100 UNIT/ML
500 SYRINGE INTRAVENOUS
Status: DISCONTINUED | OUTPATIENT
Start: 2021-11-26 | End: 2021-11-26 | Stop reason: HOSPADM

## 2021-11-26 RX ORDER — SODIUM CHLORIDE 0.9 % (FLUSH) 0.9 %
10 SYRINGE (ML) INJECTION
Status: DISCONTINUED | OUTPATIENT
Start: 2021-11-26 | End: 2021-11-26 | Stop reason: HOSPADM

## 2021-11-26 RX ORDER — SODIUM CHLORIDE 9 MG/ML
INJECTION, SOLUTION INTRAVENOUS CONTINUOUS
Status: DISCONTINUED | OUTPATIENT
Start: 2021-11-26 | End: 2021-11-26 | Stop reason: HOSPADM

## 2021-11-26 RX ADMIN — PACLITAXEL 300 MG: 100 INJECTION, POWDER, LYOPHILIZED, FOR SUSPENSION INTRAVENOUS at 03:11

## 2021-11-26 RX ADMIN — SODIUM CHLORIDE: 0.9 INJECTION, SOLUTION INTRAVENOUS at 02:11

## 2021-11-26 RX ADMIN — HEPARIN 500 UNITS: 100 SYRINGE at 03:11

## 2021-11-26 RX ADMIN — Medication 10 ML: at 03:11

## 2021-12-03 ENCOUNTER — PATIENT MESSAGE (OUTPATIENT)
Dept: HEMATOLOGY/ONCOLOGY | Facility: CLINIC | Age: 46
End: 2021-12-03
Payer: MEDICARE

## 2021-12-10 ENCOUNTER — TELEPHONE (OUTPATIENT)
Dept: PALLIATIVE MEDICINE | Facility: CLINIC | Age: 46
End: 2021-12-10
Payer: MEDICARE

## 2021-12-11 DIAGNOSIS — G89.3 NEOPLASM RELATED PAIN: ICD-10-CM

## 2021-12-13 ENCOUNTER — OFFICE VISIT (OUTPATIENT)
Dept: PALLIATIVE MEDICINE | Facility: CLINIC | Age: 46
End: 2021-12-13
Payer: MEDICARE

## 2021-12-13 VITALS — SYSTOLIC BLOOD PRESSURE: 135 MMHG | HEART RATE: 74 BPM | DIASTOLIC BLOOD PRESSURE: 80 MMHG

## 2021-12-13 DIAGNOSIS — F41.1 ANXIETY ASSOCIATED WITH CANCER DIAGNOSIS: ICD-10-CM

## 2021-12-13 DIAGNOSIS — Z17.1 MALIGNANT NEOPLASM INVOLVING BOTH NIPPLE AND AREOLA OF RIGHT BREAST IN MALE, ESTROGEN RECEPTOR NEGATIVE: Primary | ICD-10-CM

## 2021-12-13 DIAGNOSIS — Z51.5 ENCOUNTER FOR PALLIATIVE CARE: ICD-10-CM

## 2021-12-13 DIAGNOSIS — R19.7 DIARRHEA, UNSPECIFIED TYPE: ICD-10-CM

## 2021-12-13 DIAGNOSIS — M79.2 NEUROPATHIC PAIN: ICD-10-CM

## 2021-12-13 DIAGNOSIS — Z71.89 ADVANCED CARE PLANNING/COUNSELING DISCUSSION: ICD-10-CM

## 2021-12-13 DIAGNOSIS — G47.00 INSOMNIA, UNSPECIFIED TYPE: ICD-10-CM

## 2021-12-13 DIAGNOSIS — F43.23 ADJUSTMENT DISORDER WITH MIXED ANXIETY AND DEPRESSED MOOD: ICD-10-CM

## 2021-12-13 DIAGNOSIS — C80.1 ANXIETY ASSOCIATED WITH CANCER DIAGNOSIS: ICD-10-CM

## 2021-12-13 DIAGNOSIS — C50.021 MALIGNANT NEOPLASM INVOLVING BOTH NIPPLE AND AREOLA OF RIGHT BREAST IN MALE, ESTROGEN RECEPTOR NEGATIVE: Primary | ICD-10-CM

## 2021-12-13 DIAGNOSIS — C79.51 BONE METASTASES: ICD-10-CM

## 2021-12-13 DIAGNOSIS — G89.3 CANCER RELATED PAIN: ICD-10-CM

## 2021-12-13 PROCEDURE — 99999 PR PBB SHADOW E&M-EST. PATIENT-LVL II: CPT | Mod: PBBFAC,,, | Performed by: STUDENT IN AN ORGANIZED HEALTH CARE EDUCATION/TRAINING PROGRAM

## 2021-12-13 PROCEDURE — 99215 PR OFFICE/OUTPT VISIT, EST, LEVL V, 40-54 MIN: ICD-10-PCS | Mod: S$GLB,,, | Performed by: STUDENT IN AN ORGANIZED HEALTH CARE EDUCATION/TRAINING PROGRAM

## 2021-12-13 PROCEDURE — 99215 OFFICE O/P EST HI 40 MIN: CPT | Mod: S$GLB,,, | Performed by: STUDENT IN AN ORGANIZED HEALTH CARE EDUCATION/TRAINING PROGRAM

## 2021-12-13 PROCEDURE — 4010F ACE/ARB THERAPY RXD/TAKEN: CPT | Mod: CPTII,S$GLB,, | Performed by: STUDENT IN AN ORGANIZED HEALTH CARE EDUCATION/TRAINING PROGRAM

## 2021-12-13 PROCEDURE — 4010F PR ACE/ARB THEARPY RXD/TAKEN: ICD-10-PCS | Mod: CPTII,S$GLB,, | Performed by: STUDENT IN AN ORGANIZED HEALTH CARE EDUCATION/TRAINING PROGRAM

## 2021-12-13 PROCEDURE — 99999 PR PBB SHADOW E&M-EST. PATIENT-LVL II: ICD-10-PCS | Mod: PBBFAC,,, | Performed by: STUDENT IN AN ORGANIZED HEALTH CARE EDUCATION/TRAINING PROGRAM

## 2021-12-13 RX ORDER — ALPRAZOLAM 0.5 MG/1
0.5 TABLET ORAL 3 TIMES DAILY PRN
Qty: 60 TABLET | Refills: 0 | Status: SHIPPED | OUTPATIENT
Start: 2021-12-13 | End: 2022-02-09 | Stop reason: SDUPTHER

## 2021-12-13 RX ORDER — HYDROCODONE BITARTRATE AND ACETAMINOPHEN 10; 325 MG/1; MG/1
1 TABLET ORAL EVERY 6 HOURS PRN
Qty: 90 TABLET | Refills: 0 | Status: SHIPPED | OUTPATIENT
Start: 2021-12-13 | End: 2022-01-21 | Stop reason: SDUPTHER

## 2021-12-14 DIAGNOSIS — T45.1X5A CHEMOTHERAPY-INDUCED NEUROPATHY: ICD-10-CM

## 2021-12-14 DIAGNOSIS — I10 HYPERTENSION, UNSPECIFIED TYPE: ICD-10-CM

## 2021-12-14 DIAGNOSIS — E11.65 UNCONTROLLED TYPE 2 DIABETES MELLITUS WITH HYPERGLYCEMIA: ICD-10-CM

## 2021-12-14 DIAGNOSIS — G62.0 CHEMOTHERAPY-INDUCED NEUROPATHY: ICD-10-CM

## 2021-12-14 RX ORDER — AMLODIPINE BESYLATE 10 MG/1
TABLET ORAL
Qty: 90 TABLET | Refills: 3 | Status: SHIPPED | OUTPATIENT
Start: 2021-12-14 | End: 2023-06-04 | Stop reason: SDUPTHER

## 2021-12-14 RX ORDER — METFORMIN HYDROCHLORIDE 500 MG/1
1000 TABLET ORAL 2 TIMES DAILY WITH MEALS
Qty: 90 TABLET | Refills: 3 | Status: SHIPPED | OUTPATIENT
Start: 2021-12-14 | End: 2022-06-04 | Stop reason: SDUPTHER

## 2021-12-15 RX ORDER — GABAPENTIN 300 MG/1
300 CAPSULE ORAL 3 TIMES DAILY
Qty: 90 CAPSULE | Refills: 11 | Status: SHIPPED | OUTPATIENT
Start: 2021-12-15 | End: 2023-01-23 | Stop reason: SDUPTHER

## 2021-12-17 ENCOUNTER — OFFICE VISIT (OUTPATIENT)
Dept: HEMATOLOGY/ONCOLOGY | Facility: CLINIC | Age: 46
End: 2021-12-17
Payer: MEDICARE

## 2021-12-17 ENCOUNTER — LAB VISIT (OUTPATIENT)
Dept: LAB | Facility: HOSPITAL | Age: 46
End: 2021-12-17
Payer: MEDICARE

## 2021-12-17 ENCOUNTER — INFUSION (OUTPATIENT)
Dept: INFUSION THERAPY | Facility: HOSPITAL | Age: 46
End: 2021-12-17
Payer: MEDICARE

## 2021-12-17 VITALS
DIASTOLIC BLOOD PRESSURE: 60 MMHG | HEART RATE: 75 BPM | RESPIRATION RATE: 18 BRPM | TEMPERATURE: 98 F | SYSTOLIC BLOOD PRESSURE: 127 MMHG

## 2021-12-17 VITALS
HEART RATE: 74 BPM | HEIGHT: 74 IN | WEIGHT: 315 LBS | DIASTOLIC BLOOD PRESSURE: 86 MMHG | RESPIRATION RATE: 16 BRPM | TEMPERATURE: 98 F | OXYGEN SATURATION: 96 % | BODY MASS INDEX: 40.43 KG/M2 | SYSTOLIC BLOOD PRESSURE: 141 MMHG

## 2021-12-17 DIAGNOSIS — C79.51 BONE METASTASES: ICD-10-CM

## 2021-12-17 DIAGNOSIS — C50.021 MALIGNANT NEOPLASM INVOLVING BOTH NIPPLE AND AREOLA OF RIGHT BREAST IN MALE, ESTROGEN RECEPTOR NEGATIVE: Primary | ICD-10-CM

## 2021-12-17 DIAGNOSIS — Z17.1 MALIGNANT NEOPLASM INVOLVING BOTH NIPPLE AND AREOLA OF RIGHT BREAST IN MALE, ESTROGEN RECEPTOR NEGATIVE: ICD-10-CM

## 2021-12-17 DIAGNOSIS — C50.021 MALIGNANT NEOPLASM INVOLVING BOTH NIPPLE AND AREOLA OF RIGHT BREAST IN MALE, ESTROGEN RECEPTOR NEGATIVE: ICD-10-CM

## 2021-12-17 DIAGNOSIS — T45.1X5A ANTINEOPLASTIC CHEMOTHERAPY INDUCED ANEMIA: ICD-10-CM

## 2021-12-17 DIAGNOSIS — G89.3 NEOPLASM RELATED PAIN: ICD-10-CM

## 2021-12-17 DIAGNOSIS — F43.23 ADJUSTMENT DISORDER WITH MIXED ANXIETY AND DEPRESSED MOOD: ICD-10-CM

## 2021-12-17 DIAGNOSIS — C79.51 BONE METASTASES: Primary | ICD-10-CM

## 2021-12-17 DIAGNOSIS — D64.81 ANTINEOPLASTIC CHEMOTHERAPY INDUCED ANEMIA: ICD-10-CM

## 2021-12-17 DIAGNOSIS — Z17.1 MALIGNANT NEOPLASM INVOLVING BOTH NIPPLE AND AREOLA OF RIGHT BREAST IN MALE, ESTROGEN RECEPTOR NEGATIVE: Primary | ICD-10-CM

## 2021-12-17 LAB
ALBUMIN SERPL BCP-MCNC: 4 G/DL (ref 3.5–5.2)
ALP SERPL-CCNC: 96 U/L (ref 55–135)
ALT SERPL W/O P-5'-P-CCNC: 23 U/L (ref 10–44)
ANION GAP SERPL CALC-SCNC: 8 MMOL/L (ref 8–16)
AST SERPL-CCNC: 16 U/L (ref 10–40)
BILIRUB SERPL-MCNC: 1 MG/DL (ref 0.1–1)
BUN SERPL-MCNC: 11 MG/DL (ref 6–20)
CALCIUM SERPL-MCNC: 9.3 MG/DL (ref 8.7–10.5)
CHLORIDE SERPL-SCNC: 105 MMOL/L (ref 95–110)
CO2 SERPL-SCNC: 27 MMOL/L (ref 23–29)
CREAT SERPL-MCNC: 1.3 MG/DL (ref 0.5–1.4)
ERYTHROCYTE [DISTWIDTH] IN BLOOD BY AUTOMATED COUNT: 17 % (ref 11.5–14.5)
EST. GFR  (AFRICAN AMERICAN): >60 ML/MIN/1.73 M^2
EST. GFR  (NON AFRICAN AMERICAN): >60 ML/MIN/1.73 M^2
GLUCOSE SERPL-MCNC: 82 MG/DL (ref 70–110)
HCT VFR BLD AUTO: 36.5 % (ref 40–54)
HGB BLD-MCNC: 11.3 G/DL (ref 14–18)
IMM GRANULOCYTES # BLD AUTO: 0.04 K/UL (ref 0–0.04)
MCH RBC QN AUTO: 26 PG (ref 27–31)
MCHC RBC AUTO-ENTMCNC: 31 G/DL (ref 32–36)
MCV RBC AUTO: 84 FL (ref 82–98)
NEUTROPHILS # BLD AUTO: 3 K/UL (ref 1.8–7.7)
PLATELET # BLD AUTO: 189 K/UL (ref 150–450)
PMV BLD AUTO: 10.1 FL (ref 9.2–12.9)
POTASSIUM SERPL-SCNC: 4.4 MMOL/L (ref 3.5–5.1)
PROT SERPL-MCNC: 7.8 G/DL (ref 6–8.4)
RBC # BLD AUTO: 4.35 M/UL (ref 4.6–6.2)
SODIUM SERPL-SCNC: 140 MMOL/L (ref 136–145)
WBC # BLD AUTO: 4.88 K/UL (ref 3.9–12.7)

## 2021-12-17 PROCEDURE — 4010F PR ACE/ARB THEARPY RXD/TAKEN: ICD-10-PCS | Mod: CPTII,S$GLB,, | Performed by: NURSE PRACTITIONER

## 2021-12-17 PROCEDURE — 99215 OFFICE O/P EST HI 40 MIN: CPT | Mod: S$GLB,,, | Performed by: NURSE PRACTITIONER

## 2021-12-17 PROCEDURE — 99999 PR PBB SHADOW E&M-EST. PATIENT-LVL IV: ICD-10-PCS | Mod: PBBFAC,,, | Performed by: NURSE PRACTITIONER

## 2021-12-17 PROCEDURE — 63600175 PHARM REV CODE 636 W HCPCS: Performed by: INTERNAL MEDICINE

## 2021-12-17 PROCEDURE — 96375 TX/PRO/DX INJ NEW DRUG ADDON: CPT

## 2021-12-17 PROCEDURE — 96361 HYDRATE IV INFUSION ADD-ON: CPT

## 2021-12-17 PROCEDURE — 99999 PR PBB SHADOW E&M-EST. PATIENT-LVL IV: CPT | Mod: PBBFAC,,, | Performed by: NURSE PRACTITIONER

## 2021-12-17 PROCEDURE — 99214 OFFICE O/P EST MOD 30 MIN: CPT | Mod: PBBFAC,25 | Performed by: NURSE PRACTITIONER

## 2021-12-17 PROCEDURE — 96413 CHEMO IV INFUSION 1 HR: CPT

## 2021-12-17 PROCEDURE — 80053 COMPREHEN METABOLIC PANEL: CPT | Performed by: NURSE PRACTITIONER

## 2021-12-17 PROCEDURE — 25000003 PHARM REV CODE 250: Performed by: INTERNAL MEDICINE

## 2021-12-17 PROCEDURE — 36415 COLL VENOUS BLD VENIPUNCTURE: CPT | Performed by: NURSE PRACTITIONER

## 2021-12-17 PROCEDURE — 99215 PR OFFICE/OUTPT VISIT, EST, LEVL V, 40-54 MIN: ICD-10-PCS | Mod: S$GLB,,, | Performed by: NURSE PRACTITIONER

## 2021-12-17 PROCEDURE — A4216 STERILE WATER/SALINE, 10 ML: HCPCS | Performed by: INTERNAL MEDICINE

## 2021-12-17 PROCEDURE — 4010F ACE/ARB THERAPY RXD/TAKEN: CPT | Mod: CPTII,S$GLB,, | Performed by: NURSE PRACTITIONER

## 2021-12-17 PROCEDURE — 85027 COMPLETE CBC AUTOMATED: CPT | Performed by: NURSE PRACTITIONER

## 2021-12-17 RX ORDER — SODIUM CHLORIDE 0.9 % (FLUSH) 0.9 %
10 SYRINGE (ML) INJECTION
Status: DISCONTINUED | OUTPATIENT
Start: 2021-12-17 | End: 2021-12-17 | Stop reason: HOSPADM

## 2021-12-17 RX ORDER — SODIUM CHLORIDE 9 MG/ML
INJECTION, SOLUTION INTRAVENOUS CONTINUOUS
Status: CANCELLED | OUTPATIENT
Start: 2021-12-17

## 2021-12-17 RX ORDER — SODIUM CHLORIDE 9 MG/ML
INJECTION, SOLUTION INTRAVENOUS CONTINUOUS
Status: DISCONTINUED | OUTPATIENT
Start: 2021-12-17 | End: 2021-12-17 | Stop reason: HOSPADM

## 2021-12-17 RX ORDER — SODIUM CHLORIDE 9 MG/ML
INJECTION, SOLUTION INTRAVENOUS CONTINUOUS
Status: CANCELLED | OUTPATIENT
Start: 2021-12-24

## 2021-12-17 RX ORDER — ZOLEDRONIC ACID 0.04 MG/ML
4 INJECTION, SOLUTION INTRAVENOUS
Status: COMPLETED | OUTPATIENT
Start: 2021-12-17 | End: 2021-12-17

## 2021-12-17 RX ORDER — HEPARIN 100 UNIT/ML
500 SYRINGE INTRAVENOUS
Status: CANCELLED | OUTPATIENT
Start: 2021-12-17

## 2021-12-17 RX ORDER — HEPARIN 100 UNIT/ML
500 SYRINGE INTRAVENOUS
Status: CANCELLED | OUTPATIENT
Start: 2021-12-31

## 2021-12-17 RX ORDER — SODIUM CHLORIDE 0.9 % (FLUSH) 0.9 %
10 SYRINGE (ML) INJECTION
Status: CANCELLED | OUTPATIENT
Start: 2022-01-14

## 2021-12-17 RX ORDER — HEPARIN 100 UNIT/ML
500 SYRINGE INTRAVENOUS
Status: CANCELLED | OUTPATIENT
Start: 2021-12-24

## 2021-12-17 RX ORDER — SODIUM CHLORIDE 0.9 % (FLUSH) 0.9 %
10 SYRINGE (ML) INJECTION
Status: CANCELLED | OUTPATIENT
Start: 2021-12-31

## 2021-12-17 RX ORDER — ZOLEDRONIC ACID 0.04 MG/ML
4 INJECTION, SOLUTION INTRAVENOUS
Status: CANCELLED | OUTPATIENT
Start: 2022-01-14

## 2021-12-17 RX ORDER — SODIUM CHLORIDE 9 MG/ML
INJECTION, SOLUTION INTRAVENOUS CONTINUOUS
Status: CANCELLED | OUTPATIENT
Start: 2021-12-31

## 2021-12-17 RX ORDER — SODIUM CHLORIDE 0.9 % (FLUSH) 0.9 %
10 SYRINGE (ML) INJECTION
Status: CANCELLED | OUTPATIENT
Start: 2021-12-24

## 2021-12-17 RX ORDER — HEPARIN 100 UNIT/ML
500 SYRINGE INTRAVENOUS
Status: DISCONTINUED | OUTPATIENT
Start: 2021-12-17 | End: 2021-12-17 | Stop reason: HOSPADM

## 2021-12-17 RX ORDER — HEPARIN 100 UNIT/ML
500 SYRINGE INTRAVENOUS
Status: CANCELLED | OUTPATIENT
Start: 2022-01-14

## 2021-12-17 RX ORDER — SODIUM CHLORIDE 0.9 % (FLUSH) 0.9 %
10 SYRINGE (ML) INJECTION
Status: CANCELLED | OUTPATIENT
Start: 2021-12-17

## 2021-12-17 RX ADMIN — Medication 10 ML: at 04:12

## 2021-12-17 RX ADMIN — SODIUM CHLORIDE: 9 INJECTION, SOLUTION INTRAVENOUS at 03:12

## 2021-12-17 RX ADMIN — ZOLEDRONIC ACID 4 MG: 0.04 INJECTION, SOLUTION INTRAVENOUS at 03:12

## 2021-12-17 RX ADMIN — HEPARIN 500 UNITS: 100 SYRINGE at 04:12

## 2021-12-17 RX ADMIN — PACLITAXEL 300 MG: 100 INJECTION, POWDER, LYOPHILIZED, FOR SUSPENSION INTRAVENOUS at 04:12

## 2021-12-21 ENCOUNTER — PATIENT MESSAGE (OUTPATIENT)
Dept: PALLIATIVE MEDICINE | Facility: CLINIC | Age: 46
End: 2021-12-21
Payer: MEDICARE

## 2021-12-21 DIAGNOSIS — C50.021 MALIGNANT NEOPLASM INVOLVING BOTH NIPPLE AND AREOLA OF RIGHT BREAST IN MALE, ESTROGEN RECEPTOR NEGATIVE: Primary | ICD-10-CM

## 2021-12-21 DIAGNOSIS — C79.51 BONE METASTASES: ICD-10-CM

## 2021-12-21 DIAGNOSIS — Z17.1 MALIGNANT NEOPLASM INVOLVING BOTH NIPPLE AND AREOLA OF RIGHT BREAST IN MALE, ESTROGEN RECEPTOR NEGATIVE: Primary | ICD-10-CM

## 2021-12-22 DIAGNOSIS — Z17.1 MALIGNANT NEOPLASM INVOLVING BOTH NIPPLE AND AREOLA OF RIGHT BREAST IN MALE, ESTROGEN RECEPTOR NEGATIVE: Primary | ICD-10-CM

## 2021-12-22 DIAGNOSIS — C50.021 MALIGNANT NEOPLASM INVOLVING BOTH NIPPLE AND AREOLA OF RIGHT BREAST IN MALE, ESTROGEN RECEPTOR NEGATIVE: Primary | ICD-10-CM

## 2021-12-23 ENCOUNTER — INFUSION (OUTPATIENT)
Dept: INFUSION THERAPY | Facility: HOSPITAL | Age: 46
End: 2021-12-23
Attending: INTERNAL MEDICINE
Payer: MEDICARE

## 2021-12-23 ENCOUNTER — OFFICE VISIT (OUTPATIENT)
Dept: HEMATOLOGY/ONCOLOGY | Facility: CLINIC | Age: 46
End: 2021-12-23
Payer: MEDICARE

## 2021-12-23 VITALS
HEART RATE: 77 BPM | RESPIRATION RATE: 19 BRPM | SYSTOLIC BLOOD PRESSURE: 124 MMHG | SYSTOLIC BLOOD PRESSURE: 127 MMHG | WEIGHT: 315 LBS | OXYGEN SATURATION: 97 % | HEIGHT: 74 IN | DIASTOLIC BLOOD PRESSURE: 61 MMHG | TEMPERATURE: 99 F | RESPIRATION RATE: 20 BRPM | TEMPERATURE: 98 F | HEART RATE: 79 BPM | OXYGEN SATURATION: 96 % | DIASTOLIC BLOOD PRESSURE: 66 MMHG | BODY MASS INDEX: 40.43 KG/M2

## 2021-12-23 DIAGNOSIS — C79.51 BONE METASTASES: ICD-10-CM

## 2021-12-23 DIAGNOSIS — Z17.1 MALIGNANT NEOPLASM INVOLVING BOTH NIPPLE AND AREOLA OF RIGHT BREAST IN MALE, ESTROGEN RECEPTOR NEGATIVE: Primary | ICD-10-CM

## 2021-12-23 DIAGNOSIS — C50.021 MALIGNANT NEOPLASM INVOLVING BOTH NIPPLE AND AREOLA OF RIGHT BREAST IN MALE, ESTROGEN RECEPTOR NEGATIVE: Primary | ICD-10-CM

## 2021-12-23 DIAGNOSIS — D70.1 LEUKOPENIA DUE TO ANTINEOPLASTIC CHEMOTHERAPY: ICD-10-CM

## 2021-12-23 DIAGNOSIS — T45.1X5A LEUKOPENIA DUE TO ANTINEOPLASTIC CHEMOTHERAPY: ICD-10-CM

## 2021-12-23 PROCEDURE — 96361 HYDRATE IV INFUSION ADD-ON: CPT

## 2021-12-23 PROCEDURE — 3074F PR MOST RECENT SYSTOLIC BLOOD PRESSURE < 130 MM HG: ICD-10-PCS | Mod: CPTII,S$GLB,, | Performed by: INTERNAL MEDICINE

## 2021-12-23 PROCEDURE — 3078F DIAST BP <80 MM HG: CPT | Mod: CPTII,S$GLB,, | Performed by: INTERNAL MEDICINE

## 2021-12-23 PROCEDURE — 99999 PR PBB SHADOW E&M-EST. PATIENT-LVL IV: CPT | Mod: PBBFAC,,, | Performed by: INTERNAL MEDICINE

## 2021-12-23 PROCEDURE — 3074F SYST BP LT 130 MM HG: CPT | Mod: CPTII,S$GLB,, | Performed by: INTERNAL MEDICINE

## 2021-12-23 PROCEDURE — 4010F PR ACE/ARB THEARPY RXD/TAKEN: ICD-10-PCS | Mod: CPTII,S$GLB,, | Performed by: INTERNAL MEDICINE

## 2021-12-23 PROCEDURE — 3008F BODY MASS INDEX DOCD: CPT | Mod: CPTII,S$GLB,, | Performed by: INTERNAL MEDICINE

## 2021-12-23 PROCEDURE — 1160F RVW MEDS BY RX/DR IN RCRD: CPT | Mod: CPTII,S$GLB,, | Performed by: INTERNAL MEDICINE

## 2021-12-23 PROCEDURE — 1159F PR MEDICATION LIST DOCUMENTED IN MEDICAL RECORD: ICD-10-PCS | Mod: CPTII,S$GLB,, | Performed by: INTERNAL MEDICINE

## 2021-12-23 PROCEDURE — 3044F PR MOST RECENT HEMOGLOBIN A1C LEVEL <7.0%: ICD-10-PCS | Mod: CPTII,S$GLB,, | Performed by: INTERNAL MEDICINE

## 2021-12-23 PROCEDURE — 63600175 PHARM REV CODE 636 W HCPCS: Mod: JG | Performed by: INTERNAL MEDICINE

## 2021-12-23 PROCEDURE — 25000003 PHARM REV CODE 250: Performed by: INTERNAL MEDICINE

## 2021-12-23 PROCEDURE — 96413 CHEMO IV INFUSION 1 HR: CPT

## 2021-12-23 PROCEDURE — 3008F PR BODY MASS INDEX (BMI) DOCUMENTED: ICD-10-PCS | Mod: CPTII,S$GLB,, | Performed by: INTERNAL MEDICINE

## 2021-12-23 PROCEDURE — 1159F MED LIST DOCD IN RCRD: CPT | Mod: CPTII,S$GLB,, | Performed by: INTERNAL MEDICINE

## 2021-12-23 PROCEDURE — 99999 PR PBB SHADOW E&M-EST. PATIENT-LVL IV: ICD-10-PCS | Mod: PBBFAC,,, | Performed by: INTERNAL MEDICINE

## 2021-12-23 PROCEDURE — 99214 PR OFFICE/OUTPT VISIT, EST, LEVL IV, 30-39 MIN: ICD-10-PCS | Mod: S$GLB,,, | Performed by: INTERNAL MEDICINE

## 2021-12-23 PROCEDURE — 3078F PR MOST RECENT DIASTOLIC BLOOD PRESSURE < 80 MM HG: ICD-10-PCS | Mod: CPTII,S$GLB,, | Performed by: INTERNAL MEDICINE

## 2021-12-23 PROCEDURE — 4010F ACE/ARB THERAPY RXD/TAKEN: CPT | Mod: CPTII,S$GLB,, | Performed by: INTERNAL MEDICINE

## 2021-12-23 PROCEDURE — 3044F HG A1C LEVEL LT 7.0%: CPT | Mod: CPTII,S$GLB,, | Performed by: INTERNAL MEDICINE

## 2021-12-23 PROCEDURE — 1160F PR REVIEW ALL MEDS BY PRESCRIBER/CLIN PHARMACIST DOCUMENTED: ICD-10-PCS | Mod: CPTII,S$GLB,, | Performed by: INTERNAL MEDICINE

## 2021-12-23 PROCEDURE — 99214 OFFICE O/P EST MOD 30 MIN: CPT | Mod: S$GLB,,, | Performed by: INTERNAL MEDICINE

## 2021-12-23 RX ORDER — HEPARIN 100 UNIT/ML
500 SYRINGE INTRAVENOUS
Status: DISCONTINUED | OUTPATIENT
Start: 2021-12-23 | End: 2021-12-23 | Stop reason: HOSPADM

## 2021-12-23 RX ORDER — SODIUM CHLORIDE 0.9 % (FLUSH) 0.9 %
10 SYRINGE (ML) INJECTION
Status: DISCONTINUED | OUTPATIENT
Start: 2021-12-23 | End: 2021-12-23 | Stop reason: HOSPADM

## 2021-12-23 RX ORDER — SODIUM CHLORIDE 9 MG/ML
INJECTION, SOLUTION INTRAVENOUS CONTINUOUS
Status: DISCONTINUED | OUTPATIENT
Start: 2021-12-23 | End: 2021-12-23 | Stop reason: HOSPADM

## 2021-12-23 RX ADMIN — SODIUM CHLORIDE: 9 INJECTION, SOLUTION INTRAVENOUS at 03:12

## 2021-12-23 RX ADMIN — SODIUM CHLORIDE: 0.9 INJECTION, SOLUTION INTRAVENOUS at 02:12

## 2021-12-23 RX ADMIN — HEPARIN SODIUM (PORCINE) LOCK FLUSH IV SOLN 100 UNIT/ML 500 UNITS: 100 SOLUTION at 04:12

## 2021-12-23 RX ADMIN — PACLITAXEL 300 MG: 100 INJECTION, POWDER, LYOPHILIZED, FOR SUSPENSION INTRAVENOUS at 03:12

## 2021-12-27 ENCOUNTER — PATIENT MESSAGE (OUTPATIENT)
Dept: HEMATOLOGY/ONCOLOGY | Facility: CLINIC | Age: 46
End: 2021-12-27
Payer: MEDICARE

## 2021-12-27 ENCOUNTER — TELEPHONE (OUTPATIENT)
Dept: HEMATOLOGY/ONCOLOGY | Facility: CLINIC | Age: 46
End: 2021-12-27
Payer: MEDICARE

## 2021-12-28 ENCOUNTER — INFUSION (OUTPATIENT)
Dept: INFUSION THERAPY | Facility: HOSPITAL | Age: 46
End: 2021-12-28
Payer: MEDICARE

## 2021-12-28 ENCOUNTER — PATIENT MESSAGE (OUTPATIENT)
Dept: PALLIATIVE MEDICINE | Facility: CLINIC | Age: 46
End: 2021-12-28
Payer: MEDICARE

## 2021-12-28 VITALS
WEIGHT: 315 LBS | RESPIRATION RATE: 18 BRPM | HEART RATE: 70 BPM | HEIGHT: 74 IN | DIASTOLIC BLOOD PRESSURE: 84 MMHG | BODY MASS INDEX: 40.43 KG/M2 | TEMPERATURE: 98 F | SYSTOLIC BLOOD PRESSURE: 142 MMHG

## 2021-12-28 DIAGNOSIS — Z17.1 MALIGNANT NEOPLASM INVOLVING BOTH NIPPLE AND AREOLA OF RIGHT BREAST IN MALE, ESTROGEN RECEPTOR NEGATIVE: Primary | ICD-10-CM

## 2021-12-28 DIAGNOSIS — C50.021 MALIGNANT NEOPLASM INVOLVING BOTH NIPPLE AND AREOLA OF RIGHT BREAST IN MALE, ESTROGEN RECEPTOR NEGATIVE: Primary | ICD-10-CM

## 2021-12-28 PROCEDURE — 96413 CHEMO IV INFUSION 1 HR: CPT

## 2021-12-28 PROCEDURE — A4216 STERILE WATER/SALINE, 10 ML: HCPCS | Performed by: INTERNAL MEDICINE

## 2021-12-28 PROCEDURE — 63600175 PHARM REV CODE 636 W HCPCS: Performed by: INTERNAL MEDICINE

## 2021-12-28 PROCEDURE — 96361 HYDRATE IV INFUSION ADD-ON: CPT

## 2021-12-28 PROCEDURE — 25000003 PHARM REV CODE 250: Performed by: INTERNAL MEDICINE

## 2021-12-28 RX ORDER — HEPARIN 100 UNIT/ML
500 SYRINGE INTRAVENOUS
Status: DISCONTINUED | OUTPATIENT
Start: 2021-12-28 | End: 2021-12-28 | Stop reason: HOSPADM

## 2021-12-28 RX ORDER — SODIUM CHLORIDE 9 MG/ML
INJECTION, SOLUTION INTRAVENOUS CONTINUOUS
Status: DISCONTINUED | OUTPATIENT
Start: 2021-12-28 | End: 2021-12-28 | Stop reason: HOSPADM

## 2021-12-28 RX ORDER — SODIUM CHLORIDE 0.9 % (FLUSH) 0.9 %
10 SYRINGE (ML) INJECTION
Status: DISCONTINUED | OUTPATIENT
Start: 2021-12-28 | End: 2021-12-28 | Stop reason: HOSPADM

## 2021-12-28 RX ADMIN — Medication 10 ML: at 05:12

## 2021-12-28 RX ADMIN — SODIUM CHLORIDE: 0.9 INJECTION, SOLUTION INTRAVENOUS at 04:12

## 2021-12-28 RX ADMIN — PACLITAXEL 300 MG: 100 INJECTION, POWDER, LYOPHILIZED, FOR SUSPENSION INTRAVENOUS at 04:12

## 2021-12-28 RX ADMIN — HEPARIN 500 UNITS: 100 SYRINGE at 05:12

## 2021-12-29 DIAGNOSIS — Z17.1 MALIGNANT NEOPLASM INVOLVING BOTH NIPPLE AND AREOLA OF RIGHT BREAST IN MALE, ESTROGEN RECEPTOR NEGATIVE: ICD-10-CM

## 2021-12-29 DIAGNOSIS — C50.021 MALIGNANT NEOPLASM INVOLVING BOTH NIPPLE AND AREOLA OF RIGHT BREAST IN MALE, ESTROGEN RECEPTOR NEGATIVE: ICD-10-CM

## 2021-12-29 DIAGNOSIS — R19.7 DIARRHEA, UNSPECIFIED TYPE: ICD-10-CM

## 2021-12-29 RX ORDER — DIPHENOXYLATE HYDROCHLORIDE AND ATROPINE SULFATE 2.5; .025 MG/1; MG/1
1 TABLET ORAL 4 TIMES DAILY PRN
Qty: 40 TABLET | Refills: 2 | Status: SHIPPED | OUTPATIENT
Start: 2021-12-29 | End: 2022-02-10 | Stop reason: SDUPTHER

## 2022-01-02 ENCOUNTER — PATIENT MESSAGE (OUTPATIENT)
Dept: OTHER | Facility: OTHER | Age: 47
End: 2022-01-02
Payer: MEDICARE

## 2022-01-03 ENCOUNTER — TELEPHONE (OUTPATIENT)
Dept: INFUSION THERAPY | Facility: HOSPITAL | Age: 47
End: 2022-01-03
Payer: MEDICARE

## 2022-01-05 ENCOUNTER — TELEPHONE (OUTPATIENT)
Dept: INFUSION THERAPY | Facility: HOSPITAL | Age: 47
End: 2022-01-05
Payer: MEDICARE

## 2022-01-06 ENCOUNTER — TELEPHONE (OUTPATIENT)
Dept: INFUSION THERAPY | Facility: HOSPITAL | Age: 47
End: 2022-01-06
Payer: MEDICARE

## 2022-01-13 ENCOUNTER — OFFICE VISIT (OUTPATIENT)
Dept: PSYCHIATRY | Facility: CLINIC | Age: 47
End: 2022-01-13
Payer: MEDICAID

## 2022-01-13 DIAGNOSIS — C50.021 MALIGNANT NEOPLASM INVOLVING BOTH NIPPLE AND AREOLA OF RIGHT BREAST IN MALE, ESTROGEN RECEPTOR NEGATIVE: Primary | ICD-10-CM

## 2022-01-13 DIAGNOSIS — F43.23 ADJUSTMENT DISORDER WITH MIXED ANXIETY AND DEPRESSED MOOD: Primary | ICD-10-CM

## 2022-01-13 DIAGNOSIS — Z17.1 MALIGNANT NEOPLASM INVOLVING BOTH NIPPLE AND AREOLA OF RIGHT BREAST IN MALE, ESTROGEN RECEPTOR NEGATIVE: Primary | ICD-10-CM

## 2022-01-13 PROCEDURE — 90834 PSYTX W PT 45 MINUTES: CPT | Mod: ,,, | Performed by: PSYCHOLOGIST

## 2022-01-13 PROCEDURE — 99211 OFF/OP EST MAY X REQ PHY/QHP: CPT | Mod: PBBFAC | Performed by: PSYCHOLOGIST

## 2022-01-13 PROCEDURE — 99999 PR PBB SHADOW E&M-EST. PATIENT-LVL I: ICD-10-PCS | Mod: PBBFAC,,, | Performed by: PSYCHOLOGIST

## 2022-01-13 PROCEDURE — 90834 PR PSYCHOTHERAPY W/PATIENT, 45 MIN: ICD-10-PCS | Mod: ,,, | Performed by: PSYCHOLOGIST

## 2022-01-13 PROCEDURE — 99999 PR PBB SHADOW E&M-EST. PATIENT-LVL I: CPT | Mod: PBBFAC,,, | Performed by: PSYCHOLOGIST

## 2022-01-13 NOTE — PROGRESS NOTES
PSYCHO-ONCOLOGY NOTE/ Individual Psychotherapy     Date: 1/13/2022   Site:  [facility]         Therapeutic Intervention: Met with patient.  Outpatient - Behavior modifying psychotherapy 45 min - CPT code 50936    Referring provider: Shruti    Chief complaint/reason for encounter: Met with patient to evaluate psychosocial adaptation to survivorship of metastatic breast cancer  The patient's last visit with me was on 11/4/2021.    Objective:      Paul Li Jr. arrived promptly for the session (via video).  Mr. Li was independently ambulatory at the time of session. The patient was fully cooperative throughout the session.  Appearance: age appropriate, casually  dressed, well groomed  Behavior/Cooperation: friendly and cooperative  Speech: normal in rate, volume, and tone and appropriate quality, quantity and organization of sentences  Mood: euthymic  Affect: mood congruent, full range and appropriate  Thought Process: goal-directed, logical  Thought Content: normal,  No delusions or paranoia; did not appear to be responding to internal stimuli during the session  Orientation: grossly intact  Memory: Grossly intact  Attention Span/Concentration: Attends to session without distraction; reports no difficulty  Fund of Knowledge: average  Estimate of Intelligence: average from verbal skills and history  Cognition: grossly intact  Insight: patient has awareness of illness; adequate insight into own behavior and behavior of others  Judgment: the patient's behavior is adequate to circumstances      Interval history and content of current session: Patient discussed decrease in social stressors since last visit (back in own apartment). Diarrhea has improved somewhat (improved consistency led to decreased accidents), but still having higher than typical for him number of bowel movements each day (bowel movements after every meal, chooses to not eat if planning to go somewhere). Discussed current adaptation to  "disease and treatment status. Reports to be coping in an adequate manner. He is still unable to work, but hopes he will be able to work again soon (he misses the money and the travel).    Discord with his wife has decreased with his decreased engagement with her. He feels they may be "on the track" toward . He is getting his support needs met through interactions with his siblings. Patient did mention the possibility that his wife is "still upset about my cancer." Support available for families and potential links to therapists other than through the cancer center explored.    Nutrition:  Patient is excited about his weight loss in recent months. He is eating minimally (e.g., only 2 eggs and a small snack for a whole day) and "drinking water" to blunt hunger when it happens. He is also happy that this eating pattern is helping to keep his blood glucose under better control.  Discussed need for adequate nutrition while in active treatment. Patient willing to meet with RD to formulate an eating plan.    His prozac was increased to 20 mg on 10/22/21. He feels it has improved his mood significantly.  He is "prioritizing my own needs now."        Risk parameters:   Patient reports no suicidal ideation  Patient reports no homicidal ideation  Patient reports no self-injurious behavior  Patient reports no violent behavior   Safety needs:  None at this time      Verbal deficits: None     Patient's response to intervention:The patient's response to intervention is accepting, motivated.     Progress toward goals and other mental status changes:  The patient's progress toward goals is good.      Progress to date:Progress as Expected      Goals from last visit: Met      Patient reported outcomes:      Distress Thermometer:   Distress Score             Practical Problems Physical Problems                                                   Family Problems                                         Emotional Problems          "                                                Spiritual/Religions Concerns               Other Problems              PHQ-9= 11; Initial visit: 8       BILL-7=8; Initial visit: 8     GAD7 12/21/2020   1. Feeling nervous, anxious, or on edge? 0   2. Not being able to stop or control worrying? 0   3. Worrying too much about different things? 0   4. Trouble relaxing? 1   5. Being so restless that it is hard to sit still? 0   6. Becoming easily annoyed or irritable? 1   7. Feeling afraid as if something awful might happen? 0   BILL-7 Score 2         Client Strengths: verbal, intelligent, successful, good social support, commitment to wellness, strong guadalupe, strong cultural traditions      Treatment Plan:individual psychotherapy  · Target symptoms: adjustment  · Why chosen therapy is appropriate versus another modality: relevant to diagnosis, patient responds to this modality, evidence based practice  · Outcome monitoring methods: self-report, observation, checklist/rating scale  · Therapeutic intervention type: behavior modifying psychotherapy  · Prognosis: Excellent      Behavioral goals:    Exercise: continue walking (currently 1 mi/day, goal 2 mi/day)   Stress management:  meditation practice daily    Time in nature with dog   Social engagement: time with siblings   Nutrition: speak to RD about balancing weight loss/glucose management goals while in active treatment   Smoking Cessation:   Therapy:  consider marital therapy    Continue to talk to Dr. Rodriguez about diarrhea management      Prior: Sleep medicine follow-up when able    Return to clinic: as needed     Length of Service (minutes direct face-to-face contact): 45      ICD-10-CM ICD-9-CM   1. Adjustment disorder with mixed anxiety and depressed mood  F43.23 309.28         Matthew Sandoval, PhD  [place] License #[id number]

## 2022-01-13 NOTE — Clinical Note
See note- FYI- patient still having increased frequency of bowel movements (but improved consistency)

## 2022-01-13 NOTE — Clinical Note
Hi PJ- (see note) Patient is currently following a very low calorie diet. Would you be willing to put in a referral to nutrition to get him some solid education on nutritional needs? Thanks! Matthew

## 2022-01-14 ENCOUNTER — OFFICE VISIT (OUTPATIENT)
Dept: HEMATOLOGY/ONCOLOGY | Facility: CLINIC | Age: 47
End: 2022-01-14
Payer: MEDICARE

## 2022-01-14 ENCOUNTER — PATIENT OUTREACH (OUTPATIENT)
Dept: ADMINISTRATIVE | Facility: HOSPITAL | Age: 47
End: 2022-01-14
Payer: MEDICARE

## 2022-01-14 ENCOUNTER — PATIENT MESSAGE (OUTPATIENT)
Dept: HEMATOLOGY/ONCOLOGY | Facility: CLINIC | Age: 47
End: 2022-01-14

## 2022-01-14 ENCOUNTER — PATIENT MESSAGE (OUTPATIENT)
Dept: ADMINISTRATIVE | Facility: HOSPITAL | Age: 47
End: 2022-01-14
Payer: MEDICARE

## 2022-01-14 ENCOUNTER — PATIENT MESSAGE (OUTPATIENT)
Dept: PALLIATIVE MEDICINE | Facility: CLINIC | Age: 47
End: 2022-01-14
Payer: MEDICARE

## 2022-01-14 ENCOUNTER — HOSPITAL ENCOUNTER (EMERGENCY)
Facility: HOSPITAL | Age: 47
Discharge: HOME | End: 2022-01-14
Attending: EMERGENCY MEDICINE
Payer: MEDICARE

## 2022-01-14 VITALS
OXYGEN SATURATION: 96 % | TEMPERATURE: 99 F | DIASTOLIC BLOOD PRESSURE: 88 MMHG | SYSTOLIC BLOOD PRESSURE: 171 MMHG | RESPIRATION RATE: 18 BRPM | HEART RATE: 77 BPM

## 2022-01-14 DIAGNOSIS — S61.214A LACERATION OF RIGHT RING FINGER WITHOUT FOREIGN BODY WITHOUT DAMAGE TO NAIL, INITIAL ENCOUNTER: Primary | ICD-10-CM

## 2022-01-14 DIAGNOSIS — C50.021 MALIGNANT NEOPLASM INVOLVING BOTH NIPPLE AND AREOLA OF RIGHT BREAST IN MALE, ESTROGEN RECEPTOR NEGATIVE: Primary | ICD-10-CM

## 2022-01-14 DIAGNOSIS — Z17.1 MALIGNANT NEOPLASM INVOLVING BOTH NIPPLE AND AREOLA OF RIGHT BREAST IN MALE, ESTROGEN RECEPTOR NEGATIVE: Primary | ICD-10-CM

## 2022-01-14 PROCEDURE — 12001 RPR S/N/AX/GEN/TRNK 2.5CM/<: CPT | Mod: ,,, | Performed by: EMERGENCY MEDICINE

## 2022-01-14 PROCEDURE — 25000003 PHARM REV CODE 250: Performed by: EMERGENCY MEDICINE

## 2022-01-14 PROCEDURE — 99999 PR PBB SHADOW E&M-EST. PATIENT-LVL I: CPT | Mod: PBBFAC,,, | Performed by: INTERNAL MEDICINE

## 2022-01-14 PROCEDURE — 99024 PR POST-OP FOLLOW-UP VISIT: ICD-10-PCS | Mod: S$GLB,,, | Performed by: INTERNAL MEDICINE

## 2022-01-14 PROCEDURE — 1159F MED LIST DOCD IN RCRD: CPT | Mod: CPTII,S$GLB,, | Performed by: INTERNAL MEDICINE

## 2022-01-14 PROCEDURE — 12001 PR RESUPERF WND BODY <2.5CM: ICD-10-PCS | Mod: ,,, | Performed by: EMERGENCY MEDICINE

## 2022-01-14 PROCEDURE — 1160F RVW MEDS BY RX/DR IN RCRD: CPT | Mod: CPTII,S$GLB,, | Performed by: INTERNAL MEDICINE

## 2022-01-14 PROCEDURE — 12001 RPR S/N/AX/GEN/TRNK 2.5CM/<: CPT

## 2022-01-14 PROCEDURE — 99024 POSTOP FOLLOW-UP VISIT: CPT | Mod: S$GLB,,, | Performed by: INTERNAL MEDICINE

## 2022-01-14 PROCEDURE — 99284 EMERGENCY DEPT VISIT MOD MDM: CPT | Mod: 25,,, | Performed by: EMERGENCY MEDICINE

## 2022-01-14 PROCEDURE — 99284 EMERGENCY DEPT VISIT MOD MDM: CPT | Mod: 25

## 2022-01-14 PROCEDURE — 63600175 PHARM REV CODE 636 W HCPCS: Performed by: EMERGENCY MEDICINE

## 2022-01-14 PROCEDURE — 1160F PR REVIEW ALL MEDS BY PRESCRIBER/CLIN PHARMACIST DOCUMENTED: ICD-10-PCS | Mod: CPTII,S$GLB,, | Performed by: INTERNAL MEDICINE

## 2022-01-14 PROCEDURE — 90471 IMMUNIZATION ADMIN: CPT | Performed by: EMERGENCY MEDICINE

## 2022-01-14 PROCEDURE — 99284 PR EMERGENCY DEPT VISIT,LEVEL IV: ICD-10-PCS | Mod: 25,,, | Performed by: EMERGENCY MEDICINE

## 2022-01-14 PROCEDURE — 1159F PR MEDICATION LIST DOCUMENTED IN MEDICAL RECORD: ICD-10-PCS | Mod: CPTII,S$GLB,, | Performed by: INTERNAL MEDICINE

## 2022-01-14 PROCEDURE — 90715 TDAP VACCINE 7 YRS/> IM: CPT | Performed by: EMERGENCY MEDICINE

## 2022-01-14 PROCEDURE — 99999 PR PBB SHADOW E&M-EST. PATIENT-LVL I: ICD-10-PCS | Mod: PBBFAC,,, | Performed by: INTERNAL MEDICINE

## 2022-01-14 RX ORDER — LIDOCAINE HYDROCHLORIDE 10 MG/ML
5 INJECTION, SOLUTION EPIDURAL; INFILTRATION; INTRACAUDAL; PERINEURAL
Status: COMPLETED | OUTPATIENT
Start: 2022-01-14 | End: 2022-01-14

## 2022-01-14 RX ORDER — CEPHALEXIN 250 MG/1
250 CAPSULE ORAL EVERY 8 HOURS
Qty: 21 CAPSULE | Refills: 0 | Status: SHIPPED | OUTPATIENT
Start: 2022-01-14 | End: 2022-01-25

## 2022-01-14 RX ADMIN — TETANUS TOXOID, REDUCED DIPHTHERIA TOXOID AND ACELLULAR PERTUSSIS VACCINE, ADSORBED 0.5 ML: 5; 2.5; 8; 8; 2.5 SUSPENSION INTRAMUSCULAR at 04:01

## 2022-01-14 RX ADMIN — LIDOCAINE HYDROCHLORIDE 50 MG: 10 INJECTION, SOLUTION EPIDURAL; INFILTRATION; INTRACAUDAL at 04:01

## 2022-01-14 NOTE — ED PROVIDER NOTES
Encounter Date: 1/14/2022    SCRIBE #1 NOTE: I, Trenton Dash, am scribing for, and in the presence of,  Cl Lisa DO. I have scribed the following portions of the note - Other sections scribed: HPI, ROS, PE.       History     Chief Complaint   Patient presents with    Laceration     Cut right ring finger with blade while attempting to cut carpet      Time patient was seen by the provider: 3:58 PM    The patient is a 46 y.o. male with a PMHx including: HTN and DM who presents to the ED with a complaint of a laceration. The patient was cutting carpet, and when he was putting the knife into the carpet he cut his fourth digit on his right hand. He states he bleed severely after he cut his finger. He wrapped his finger in gauze and tight tape before he came, and he is actively bleeding in the ED. He is a chemotherapy patient, and he had the lymph nodes removed in his right arm, which makes his arm look swollen when he states it is not. He denies any loss of sensation to the finger. He does not remember when his last Tetanus shot was.    The history is provided by the patient and medical records. No  was used.     Review of patient's allergies indicates:  No Known Allergies  Past Medical History:   Diagnosis Date    Arthritis     Breast cancer     Cancer     R breast    Diabetes mellitus     HTN (hypertension)     Leg edema, right     Prediabetes     Seizures     at age 18 - stress related    Therapy     marital counseling     Past Surgical History:   Procedure Laterality Date    AXILLARY NODE DISSECTION Right 11/22/2019    Procedure: LYMPHADENECTOMY, AXILLARY RIGHT;  Surgeon: Shima Whyte MD;  Location: [facility] OR;  Service: General;  Laterality: Right;    AXILLARY NODE DISSECTION Right 12/17/2019    Procedure: LYMPHADENECTOMY, AXILLARY;  Surgeon: Shima Whyte MD;  Location: [facility] OR [address] [place] [address];  Service: General;  Laterality: Right;    BREAST BIOPSY      INSERTION OF TUNNELED CENTRAL  VENOUS CATHETER (CVC) WITH SUBCUTANEOUS PORT Left 2019    Procedure: [id number];  Surgeon: Shima Whyte MD;  Location: [facility] OR 2ND FLR;  Service: General;  Laterality: Left;    INSERTION OF TUNNELED CENTRAL VENOUS CATHETER (CVC) WITH SUBCUTANEOUS PORT Left 2021    Procedure: INSERTION, SINGLE LUMEN CATHETER WITH PORT, WITH FLUOROSCOPIC GUIDANCE, right;  Surgeon: Gloria Holliday MD;  Location: [facility] OR 2ND FLR;  Service: General;  Laterality: Left;  rapid covid test    SENTINEL LYMPH NODE BIOPSY Right 2019    Procedure: BIOPSY, LYMPH NODE, SENTINEL RIGHT;  Surgeon: Shima Whyte MD;  Location: [facility];  Service: General;  Laterality: Right;    SIMPLE MASTECTOMY Right 2019    Procedure: MASTECTOMY, SIMPLE RIGHT (CONSENT AM OF) 2.5 hr case;  Surgeon: Shima Whyte MD;  Location: [facility];  Service: General;  Laterality: Right;     Family History   Problem Relation Age of Onset    Hypertension Mother     Diabetes Mother     Hypertension Father     Lupus Father     Post-traumatic stress disorder Brother     No Known Problems Maternal Grandmother     Colon cancer Maternal Grandfather     Breast cancer Paternal Grandmother     No Known Problems Paternal Grandfather     No Known Problems Sister     No Known Problems Maternal Aunt     No Known Problems Maternal Uncle     Fibromyalgia Paternal Aunt     Lupus Paternal Aunt     No Known Problems Paternal Uncle     No Known Problems Brother     No Known Problems Sister     No Known Problems Son     No Known Problems Son     No Known Problems Son     No Known Problems Son      Social History     Tobacco Use    Smoking status: Former Smoker     Packs/day: 0.25     Years: 15.00     Pack years: 3.75     Types: Cigarettes     Quit date: 2014     Years since quittin.1    Smokeless tobacco: Never Used    Tobacco comment: Social smoker with alcohol    Substance Use Topics    Alcohol use: Yes     Alcohol/week: 0.0  standard drinks     Comment: Rarely    Drug use: Not Currently     Types: Marijuana     Review of Systems   Constitutional: Negative for chills and fever.   HENT: Negative for congestion and sore throat.    Eyes: Negative for photophobia and visual disturbance.   Respiratory: Negative for cough and shortness of breath.    Cardiovascular: Negative for chest pain and leg swelling.   Gastrointestinal: Negative for diarrhea, nausea and vomiting.   Genitourinary: Negative for difficulty urinating and dysuria.   Musculoskeletal: Negative for arthralgias and back pain.   Skin: Positive for wound (right ring finger lac).   Neurological: Negative for numbness and headaches.       Physical Exam     Initial Vitals   BP Pulse Resp Temp SpO2   01/14/22 1549 01/14/22 1549 01/14/22 1549 01/14/22 1551 01/14/22 1549   (!) 171/88 77 18 98.6 °F (37 °C) 96 %      MAP       --                Physical Exam    Constitutional: He appears well-developed and well-nourished.   HENT:   Head: Normocephalic and atraumatic.   Eyes: Pupils are equal, round, and reactive to light.   Neck:   Normal range of motion.  Cardiovascular: Normal rate.   Pulmonary/Chest: Breath sounds normal.   Abdominal: Abdomen is soft.   Musculoskeletal:         General: Normal range of motion.      Cervical back: Normal range of motion.     Neurological: He is alert.   Skin: Skin is warm. Capillary refill takes less than 2 seconds.   Right Ring Finger: 1.5cm circular laceration, sensation intact.   Psychiatric: He has a normal mood and affect.         ED Course   Lac Repair    Date/Time: 1/14/2022 4:32 PM  Performed by: Cl Lisa DO  Authorized by: Cl Lisa DO     Consent:     Consent obtained:  Verbal    Consent given by:  Patient    Risks discussed:  Infection, pain and poor cosmetic result  Universal protocol:     Procedure explained and questions answered to patient or proxy's satisfaction: yes      Patient identity confirmed:  Verbally with  patient  Anesthesia:     Anesthesia method:  Local infiltration    Local anesthetic:  Lidocaine 1% w/o epi  Laceration details:     Location:  Finger    Finger location:  R ring finger    Length (cm):  1.5    Depth (mm):  1  Pre-procedure details:     Preparation:  Patient was prepped and draped in usual sterile fashion  Treatment:     Area cleansed with:  Soap and water    Amount of cleaning:  Extensive    Irrigation solution:  Tap water    Irrigation method:  Tap    Visualized foreign bodies/material removed: no      Debridement:  None    Undermining:  None  Skin repair:     Repair method:  Sutures    Suture size:  4-0    Suture material:  Nylon    Suture technique:  Simple interrupted    Number of sutures:  8  Approximation:     Approximation:  Close  Post-procedure details:     Dressing:  Adhesive bandage    Procedure completion:  Tolerated      Labs Reviewed - No data to display       Imaging Results    None          Medications   LIDOcaine (PF) 10 mg/ml (1%) injection 50 mg (50 mg Infiltration Given 1/14/22 1625)   Tdap (BOOSTRIX) vaccine injection 0.5 mL (0.5 mLs Intramuscular Given 1/14/22 1628)     Medical Decision Making:   History:   Old Medical Records: I decided to obtain old medical records.  The patient was seen during the public health crisis due to COVID-19.  Personal protective equipment worn for this encounter included gloves mask and eye protection            Scribe Attestation:   Scribe #1: I performed the above scribed service and the documentation accurately describes the services I performed. I attest to the accuracy of the note.                 Clinical Impression:   Final diagnoses:  [S61.214A] Laceration of right ring finger without foreign body without damage to nail, initial encounter (Primary)          ED Disposition Condition    Discharge Stable        ED Prescriptions     Medication Sig Dispense Start Date End Date Auth. Provider    cephALEXin (KEFLEX) 250 MG capsule Take 1 capsule  (250 mg total) by mouth every 8 (eight) hours. for 7 days 21 capsule 1/14/2022 1/21/2022 Cl Lisa, DO        Follow-up Information     Follow up With Specialties Details Why Contact Info    Kareem Arroyo MD Family Medicine  For suture removal [address]  [facility] [address]  [phone]             Cl Lisa,   01/14/22 1935

## 2022-01-14 NOTE — ED TRIAGE NOTES
Pt reports accidentally cutting R ring finger towards top of finger. Bleeding controlled in triage.     LOC: The patient is awake, alert and aware of environment with an appropriate affect, the patient is oriented x 3 and speaking appropriately.  APPEARANCE: Patient resting comfortably and in no acute distress, patient is clean and well groomed, patient's clothing is properly fastened.  SKIN: The skin is warm and dry, color consistent with ethnicity, patient has normal skin turgor and moist mucus membranes, circular laceration to ring finger  MUSCULOSKELETAL: Patient moving all extremities spontaneously, no obvious swelling or deformities noted.  RESPIRATORY: Airway is open and patent, respirations are spontaneous, patient has a normal effort and rate, no accessory muscle use noted,   CARDIAC: Patient has a normal rate and regular rhythm, no periphreal edema noted, capillary refill < 3 seconds.  ABDOMEN: Soft and non tender to palpation, no distention noted, normoactive bowel sounds present in all four quadrants.  NEUROLOGIC:  facial expression is symmetrical, patient moving all extremities spontaneously, normal sensation in all extremities when touched with a finger.  Follows all commands appropriately.

## 2022-01-14 NOTE — PROGRESS NOTES
Subjective:       Patient ID: Paul Li Jr. is a 46 y.o. male.    Chief Complaint: No chief complaint on file.    HPI     Chief Complaint: Malignant neoplasm involving both nipple and areola of right     Returns for follow up and C4D15 Abraxane.   Reports mental stressors     Personal stressors  Physically feeling well  States when sitting for longer period of time notes off and on pain in mid to low back-- he feels it is muscular and relieved with ibuprofen  No radiation  Adequate energy level; does tire more easily  Eating well  Diarrhea better with lomotil     Most recent imaging:  10/5/2021 PET:   FINDINGS:  Quality of the study: Adequate.  In the head and neck, there are no hypermetabolic lesions worrisome for malignancy. There are no hypermetabolic mucosal lesions, and there are no pathologically enlarged or hypermetabolic lymph nodes.  In the chest, no postsurgical changes of right mastectomy/right axillary lymph node dissection.  There are no hypermetabolic lesions worrisome for malignancy.  There are no concerning pulmonary nodules or masses, and there are no pathologically enlarged or hypermetabolic lymph nodes.  Stable subcentimeter bilateral pulmonary micro nodules too small for metabolic assessment with PET.  For reference, right lung axial series 2, image 83.  In the abdomen and pelvis, there is physiologic tracer distribution within the abdominal organs and excretion into the genitourinary system.  In the bones, there are multiple hypermetabolic lesions consistent with viable metastases.  Index lesions as follows:  1. Sclerotic lesion T12 vertebral body (axial series 2, image 125).  SUV max of 8.3 (previously 12).  2. Diffuse ill-defined sclerosis of the sacrum(axial fused images 195 and 200).  SUV max of 6.6 (previously 9.7).  3. Sclerotic lesion left iliac bone (axial series 2, image 201).  SUV max of 5.8 (previously 6).  Impression:  Hypermetabolic osseous metastases consistent with partial  response to treatment and persistent viable disease.  No new hypermetabolic osseous lesions.  Stable subcentimeter pulmonary nodules too small for metabolic assessment with PET.  Attention on follow-up.     In summary,   Started aplelisib 2021   Abraxane C1 started 2021  We had sent Guardant and discussed results with Molecular tumor board  He also had a repeat bx to confirm metastatic triple negative breast cancer  Plan:   Nab-Paclitaxel and Alpelisib  Reference: https://ascopubs.org/doi/abs/10.1200/JCO.[email or web address]  Also on Zometa-      Diagnosis:  Metastatic TNBC progressed (prior Stage IB ([id number]) triple negative invasive ductal carcinoma with lobular features of right breast, retroareolar, Grade 2, Ki67 80%, diagnosed 2019)     Oncology History:  Metastatic  Set up for scans (due to back pain) which are consistent for recurrence.   Imagin/3/2021 MRI T/L spine:  FINDINGS:  Alignment: Within normal limits.  Vertebrae: Low T1 signal intensity in the left T12 vertebral body extending to the left pedicle, S1 and S2 vertebral bodies with abnormal enhancement.  The vertebral heights are well maintained.  No evidence of acute fractures.  Abnormal appearance of the LEFT sacrum and ilium as well.  Discs: Degenerative disease of the level of L4-L5 with posterior annular fissure.  Conus appears within normal limits terminating at the level of the L2. level.  Cauda equina appears unremarkable.  Mild circumferential disc bulging at the level of T10-T11 abutting the ventral thecal sac.  No significant spinal canal stenosis or neural foraminal narrowing throughout the thoracic spine.  No significant spinal canal stenosis or neural foraminal narrowing throughout the lumbar spine.  Paraspinous muscles and soft tissues: Subcentimeter focal enhancement in the posterior column along the supraspinous ligament at the level of L1-L2 (sagittal series 21, image 10) potentially inflammatory versus  neoplastic.  Impression:  Abnormal appearance of the T12, S1, S2 vertebral bodies as well as LEFT sacrum and ilium concerning for metastatic disease in this patient with history of breast cancer.  No evidence for significant central canal narrowing.  Additional findings, as above.  This report was flagged in Epic as abnormal.     6/9/2021 PET scan:  COMPARISON:  MRI thoracic and lumbar spine 06/03/2021  FINDINGS:  Quality of the study: Adequate.  In the head and neck, there are no hypermetabolic lesions worrisome for malignancy. There are no hypermetabolic mucosal lesions, and there are no pathologically enlarged or hypermetabolic lymph nodes.  In the chest, there is postoperative change of right mastectomy/right axillary lymph node dissection.  There is low level hypermetabolic activity with mild soft tissue thickening in the skin/superficial subcutaneous fat of the right chest wall (axial fused image 78) with SUV max 3.6.  There is soft tissue thickening measuring approximately 1.8 x 7.9 cm in the subcutaneous fat of the right chest wall (axial series 3, image 84) with SUV max 3.1.  There are a few bilateral pulmonary nodules measuring up to 0.3 cm in the left lung (axial series 3, image 88) and 0.5 cm in the right lung (axial series 3, image 84).  These nodules are too small to characterize by PET.  There are no pathologically enlarged or hypermetabolic lymph nodes.  In the abdomen and pelvis, there is physiologic tracer distribution within the abdominal organs and excretion into the genitourinary system.  Subtle sen mesentery and multiple prominent mesenteric lymph nodes measuring up to 1.8 cm in long axis with background activity.  In the bones, there are several hypermetabolic lesions worrisome for malignancy.  Sclerotic lesion in the left T12 vertebral body (axial series 3, image 131) with SUV max 12.  Sclerotic lesions in the sacrum (for example axial series 3, image 188) with SUV max 9.7.  Sclerotic  lesions in the left iliac bone (for example axial series 3, image 205) with SUV max 6.  In the extremities, there are no hypermetabolic lesions worrisome for malignancy.  Incidental findings:  Bilateral maxillary antra mucous retention cysts.  Fat containing right inguinal hernia.  Impression:  1. In this patient with right breast carcinoma status post mastectomy/right axillary lymph node dissection, there are multiple sclerotic lesions in the T12 vertebral body, sacrum, and left iliac bone with hypermetabolic activity concerning for active metastatic disease and in keeping with findings on MRI 06/03/2021.  2. Additionally there is low-level hypermetabolic activity with soft tissue thickening in the right chest wall as detailed above.  These findings are nonspecific and may be related to postoperative/post radiation change, with recurrent malignancy not excluded.  3. Nonspecific mesenteric haziness and lymph nodes which may indicate mesenteric adenitis.  Recommend clinical correlation and attention on follow-up.  4. Additional findings as above.  I, Sohan Tillman MD, attest that I reviewed and interpreted the images.     - C1D1 Abraxane 8/13/2021  - Started aplelisib 8/22/2021               Oncology History   Malignant neoplasm involving both nipple and areola of right breast in male, estrogen receptor negative   5/1/2019 Imaging Significant Findings     Mammogram and US  Impression:  Right  Mass: Right breast 14 mm mass at the retroareolar anterior position. Assessment: 4 - Suspicious finding. Biopsy is recommended.   Left  There is no mammographic or sonographic evidence of malignancy.  Recommendation:  Biopsy is recommended.      5/2/2019 Biopsy     BREAST, RIGHT, RETROAREOLAR MASS, ULTRASOUND-GUIDED BIOPSY:  Invasive ductal carcinoma with lobular features.  Size of invasive carcinoma: 5 MM in greatest linear dimension within a single      5/2/2019 Breast Tumor Markers     Estrogen: Negative  Progesterone:  Negative  HER2: Negative  Ki67: 80      5/17/2019 Cancer Staged     Staging form: Breast, AJCC 8th Edition  - Clinical stage from 5/17/2019: Stage IB (cT1c, cN0, cM0, G2, ER-, SC-, HER2-) - Signed by Richa Bahena MD on 5/17/2019 5/27/2019 - 7/21/2019 Chemotherapy     Treatment Summary   Plan Name: OP BREAST DOSE-DENSE AC - DOXORUBICIN CYCLOPHOSPHAMIDE Q2W  Treatment Goal: Curative  Status: Inactive  Start Date: 5/27/2019  End Date: 7/9/2019  Provider: Richa Bahena MD  Chemotherapy: DOXOrubicin chemo injection 186 mg, 60 mg/m2 = 186 mg, Intravenous, Clinic/HOD 1 time, 4 of 4 cycles  Administration: 186 mg (5/27/2019), 186 mg (6/10/2019), 186 mg (6/24/2019), 186 mg (7/8/2019)  cyclophosphamide (CYTOXAN) 600 mg/m2 = 1,865 mg in sodium chloride 0.9% 250 mL chemo infusion, 600 mg/m2 = 1,865 mg, Intravenous, Clinic/HOD 1 time, 4 of 4 cycles  Administration: 1,865 mg (5/27/2019), 1,865 mg (6/10/2019), 1,865 mg (6/24/2019), 1,865 mg (7/8/2019)      7/22/2019 - 10/15/2019 Chemotherapy     Treatment Summary   Plan Name: OP PACLITAXEL QW  Treatment Goal: Curative  Status: Inactive  Start Date: 7/24/2019  End Date: 10/8/2019  Provider: Richa Bahena MD  Chemotherapy: PACLitaxel (TAXOL) 80 mg/m2 = 246 mg in sodium chloride 0.9% 250 mL chemo infusion, 80 mg/m2 = 246 mg, Intravenous, Clinic/HOD 1 time, 12 of 12 cycles  Dose modification: 60 mg/m2 (original dose 80 mg/m2, Cycle 3), 55 mg/m2 (original dose 80 mg/m2, Cycle 7), 60 mg/m2 (original dose 80 mg/m2, Cycle 7), 50 mg/m2 (original dose 80 mg/m2, Cycle 9), 50 mg/m2 (original dose 80 mg/m2, Cycle 10)  Administration: 246 mg (7/24/2019), 246 mg (7/31/2019), 186 mg (8/7/2019), 186 mg (8/14/2019), 186 mg (8/21/2019), 186 mg (8/28/2019), 186 mg (9/4/2019), 174 mg (9/11/2019), 156 mg (9/18/2019), 156 mg (9/25/2019), 156 mg (10/1/2019), 156 mg (10/8/2019)      11/22/2019 Breast Surgery     On 11/22/19 Dr milan performed a right breast mastectomy with SLN biopsy  with pathology showing grade 2, 6 mm IDC with extensive treatment effect, no LVI with 1 LN positive 4 mm, negative margins. The on 12/17/19, Dr Whyte performed right axillary dissection with pathology still pending.      11/22/2019 Cancer Staged     ypT1b N1      12/17/2019 Breast Surgery     Surgery: Dr Shima Whyte, [phone] performed right ALND with pathology showing 14 negative lymph nodes.             1/14/2020 Tumor Conference      Post mastectomy radiation therapy: Xeloda, then possible Keytruda trial .      2/3/2020 - 3/23/2020 Radiation Therapy     Treating physician: Speedy Nair MD   Total Dose: 50.4 Gy to the chest wall and regional lymphatics  10 Gy boost to the prostate.   Fractions: 28 fractions at 1.8 Gy per fraction  5 fractions at 2 Gy per fraction       2/28/2020 -  Chemotherapy     * 4/15/2020 - 9/28/20 completed 6 cycles adjuvant Xeloda 1000 mg/m2 bid days 1-14 every 21 days  Treatment Summary   Plan Name: OP CAPECITABINE 1250MG/M2 BID Q3W  Treatment Goal: Curative  Status: Active  Start Date: 3/20/2020  End Date: 3/20/2020  Provider: Richa Bahena MD  Chemotherapy: [No matching medication found in this treatment plan]                  Review of Systems      Objective:      Physical Exam    Assessment:       Problem List Items Addressed This Visit    None         Plan:       ***

## 2022-01-14 NOTE — PROGRESS NOTES
Non-compliant [facility] report chart review - Chart review completed for the following HM test if overdue  Diabetic lab testing, and/or Dilated EYE EXAM)  01/14/2022          Health Maintenance Due   Topic Date Due    Eye Exam  11/11/2020

## 2022-01-18 PROBLEM — E87.1 HYPONATREMIA: Status: RESOLVED | Noted: 2021-10-22 | Resolved: 2022-01-18

## 2022-01-18 NOTE — PROGRESS NOTES
Subjective:       Patient ID: Paul Li Jr. is a 46 y.o. male.    Chief Complaint: Malignant neoplasm involving both nipple and areola of righ    HPI     Chief Complaint: Malignant neoplasm involving both nipple and areola of right     Returns for follow up and C6D1 Abraxane.   Appetite is good.   Physically feeling well. Minimal nausea.   Tingling in the left leg and intermittent in his hands - bilateral hands    Reports mental stressors, still up and down, related to his wife.         Per Dr. Linn's previous note: Diagnosis:  Metastatic TNBC progressed (prior Stage IB ([id number]) triple negative invasive ductal carcinoma with lobular features of right breast, retroareolar, Grade 2, Ki67 80%, diagnosed 2019)     Oncology History:  Metastatic  Set up for scans (due to back pain) which are consistent for recurrence.   Imagin/3/2021 MRI T/L spine:  FINDINGS:  Alignment: Within normal limits.  Vertebrae: Low T1 signal intensity in the left T12 vertebral body extending to the left pedicle, S1 and S2 vertebral bodies with abnormal enhancement.  The vertebral heights are well maintained.  No evidence of acute fractures.  Abnormal appearance of the LEFT sacrum and ilium as well.  Discs: Degenerative disease of the level of L4-L5 with posterior annular fissure.  Conus appears within normal limits terminating at the level of the L2. level.  Cauda equina appears unremarkable.  Mild circumferential disc bulging at the level of T10-T11 abutting the ventral thecal sac.  No significant spinal canal stenosis or neural foraminal narrowing throughout the thoracic spine.  No significant spinal canal stenosis or neural foraminal narrowing throughout the lumbar spine.  Paraspinous muscles and soft tissues: Subcentimeter focal enhancement in the posterior column along the supraspinous ligament at the level of L1-L2 (sagittal series 21, image 10) potentially inflammatory versus neoplastic.  Impression:  Abnormal appearance of  the T12, S1, S2 vertebral bodies as well as LEFT sacrum and ilium concerning for metastatic disease in this patient with history of breast cancer.  No evidence for significant central canal narrowing.  Additional findings, as above.  This report was flagged in Epic as abnormal.     6/9/2021 PET scan:  COMPARISON:  MRI thoracic and lumbar spine 06/03/2021  FINDINGS:  Quality of the study: Adequate.  In the head and neck, there are no hypermetabolic lesions worrisome for malignancy. There are no hypermetabolic mucosal lesions, and there are no pathologically enlarged or hypermetabolic lymph nodes.  In the chest, there is postoperative change of right mastectomy/right axillary lymph node dissection.  There is low level hypermetabolic activity with mild soft tissue thickening in the skin/superficial subcutaneous fat of the right chest wall (axial fused image 78) with SUV max 3.6.  There is soft tissue thickening measuring approximately 1.8 x 7.9 cm in the subcutaneous fat of the right chest wall (axial series 3, image 84) with SUV max 3.1.  There are a few bilateral pulmonary nodules measuring up to 0.3 cm in the left lung (axial series 3, image 88) and 0.5 cm in the right lung (axial series 3, image 84).  These nodules are too small to characterize by PET.  There are no pathologically enlarged or hypermetabolic lymph nodes.  In the abdomen and pelvis, there is physiologic tracer distribution within the abdominal organs and excretion into the genitourinary system.  Subtle sen mesentery and multiple prominent mesenteric lymph nodes measuring up to 1.8 cm in long axis with background activity.  In the bones, there are several hypermetabolic lesions worrisome for malignancy.  Sclerotic lesion in the left T12 vertebral body (axial series 3, image 131) with SUV max 12.  Sclerotic lesions in the sacrum (for example axial series 3, image 188) with SUV max 9.7.  Sclerotic lesions in the left iliac bone (for example axial  series 3, image 205) with SUV max 6.  In the extremities, there are no hypermetabolic lesions worrisome for malignancy.  Incidental findings:  Bilateral maxillary antra mucous retention cysts.  Fat containing right inguinal hernia.  Impression:  1. In this patient with right breast carcinoma status post mastectomy/right axillary lymph node dissection, there are multiple sclerotic lesions in the T12 vertebral body, sacrum, and left iliac bone with hypermetabolic activity concerning for active metastatic disease and in keeping with findings on MRI 06/03/2021.  2. Additionally there is low-level hypermetabolic activity with soft tissue thickening in the right chest wall as detailed above.  These findings are nonspecific and may be related to postoperative/post radiation change, with recurrent malignancy not excluded.  3. Nonspecific mesenteric haziness and lymph nodes which may indicate mesenteric adenitis.  Recommend clinical correlation and attention on follow-up.  4. Additional findings as above.  I, Sohan Tillman MD, attest that I reviewed and interpreted the images.    Most recent imaging:  10/5/2021 PET:   FINDINGS:  Quality of the study: Adequate.  In the head and neck, there are no hypermetabolic lesions worrisome for malignancy. There are no hypermetabolic mucosal lesions, and there are no pathologically enlarged or hypermetabolic lymph nodes.  In the chest, no postsurgical changes of right mastectomy/right axillary lymph node dissection.  There are no hypermetabolic lesions worrisome for malignancy.  There are no concerning pulmonary nodules or masses, and there are no pathologically enlarged or hypermetabolic lymph nodes.  Stable subcentimeter bilateral pulmonary micro nodules too small for metabolic assessment with PET.  For reference, right lung axial series 2, image 83.  In the abdomen and pelvis, there is physiologic tracer distribution within the abdominal organs and excretion into the genitourinary  system.  In the bones, there are multiple hypermetabolic lesions consistent with viable metastases.  Index lesions as follows:  1. Sclerotic lesion T12 vertebral body (axial series 2, image 125).  SUV max of 8.3 (previously 12).  2. Diffuse ill-defined sclerosis of the sacrum(axial fused images 195 and 200).  SUV max of 6.6 (previously 9.7).  3. Sclerotic lesion left iliac bone (axial series 2, image 201).  SUV max of 5.8 (previously 6).  Impression:  Hypermetabolic osseous metastases consistent with partial response to treatment and persistent viable disease.  No new hypermetabolic osseous lesions.  Stable subcentimeter pulmonary nodules too small for metabolic assessment with PET.  Attention on follow-up.     In summary,   Started aplelisib 8/22/2021   Abraxane C1 started 8/13/2021  We had sent Guardant and discussed results with Molecular tumor board  He also had a repeat bx to confirm metastatic triple negative breast cancer  Plan:   Nab-Paclitaxel and Alpelisib  Reference: https://ascopubs.org/doi/abs/10.1200/JCO.[email or web address]_[email or web address]  Also on Zometa-      - C1D1 Abraxane 8/13/2021  - Started aplelisib 8/22/2021               Oncology History   Malignant neoplasm involving both nipple and areola of right breast in male, estrogen receptor negative   5/1/2019 Imaging Significant Findings     Mammogram and US  Impression:  Right  Mass: Right breast 14 mm mass at the retroareolar anterior position. Assessment: 4 - Suspicious finding. Biopsy is recommended.   Left  There is no mammographic or sonographic evidence of malignancy.  Recommendation:  Biopsy is recommended.      5/2/2019 Biopsy     BREAST, RIGHT, RETROAREOLAR MASS, ULTRASOUND-GUIDED BIOPSY:  Invasive ductal carcinoma with lobular features.  Size of invasive carcinoma: 5 MM in greatest linear dimension within a single      5/2/2019 Breast Tumor Markers     Estrogen: Negative  Progesterone: Negative  HER2: Negative  Ki67: 80      5/17/2019 Cancer Staged      Staging form: Breast, AJCC 8th Edition  - Clinical stage from 5/17/2019: Stage IB (cT1c, cN0, cM0, G2, ER-, CO-, HER2-) - Signed by Richa Bahena MD on 5/17/2019 5/27/2019 - 7/21/2019 Chemotherapy     Treatment Summary   Plan Name: OP BREAST DOSE-DENSE AC - DOXORUBICIN CYCLOPHOSPHAMIDE Q2W  Treatment Goal: Curative  Status: Inactive  Start Date: 5/27/2019  End Date: 7/9/2019  Provider: Richa Bahena MD  Chemotherapy: DOXOrubicin chemo injection 186 mg, 60 mg/m2 = 186 mg, Intravenous, Clinic/HOD 1 time, 4 of 4 cycles  Administration: 186 mg (5/27/2019), 186 mg (6/10/2019), 186 mg (6/24/2019), 186 mg (7/8/2019)  cyclophosphamide (CYTOXAN) 600 mg/m2 = 1,865 mg in sodium chloride 0.9% 250 mL chemo infusion, 600 mg/m2 = 1,865 mg, Intravenous, Clinic/HOD 1 time, 4 of 4 cycles  Administration: 1,865 mg (5/27/2019), 1,865 mg (6/10/2019), 1,865 mg (6/24/2019), 1,865 mg (7/8/2019)      7/22/2019 - 10/15/2019 Chemotherapy     Treatment Summary   Plan Name: OP PACLITAXEL QW  Treatment Goal: Curative  Status: Inactive  Start Date: 7/24/2019  End Date: 10/8/2019  Provider: Richa Bahena MD  Chemotherapy: PACLitaxel (TAXOL) 80 mg/m2 = 246 mg in sodium chloride 0.9% 250 mL chemo infusion, 80 mg/m2 = 246 mg, Intravenous, Clinic/HOD 1 time, 12 of 12 cycles  Dose modification: 60 mg/m2 (original dose 80 mg/m2, Cycle 3), 55 mg/m2 (original dose 80 mg/m2, Cycle 7), 60 mg/m2 (original dose 80 mg/m2, Cycle 7), 50 mg/m2 (original dose 80 mg/m2, Cycle 9), 50 mg/m2 (original dose 80 mg/m2, Cycle 10)  Administration: 246 mg (7/24/2019), 246 mg (7/31/2019), 186 mg (8/7/2019), 186 mg (8/14/2019), 186 mg (8/21/2019), 186 mg (8/28/2019), 186 mg (9/4/2019), 174 mg (9/11/2019), 156 mg (9/18/2019), 156 mg (9/25/2019), 156 mg (10/1/2019), 156 mg (10/8/2019)      11/22/2019 Breast Surgery     On 11/22/19 Dr milan performed a right breast mastectomy with SLN biopsy with pathology showing grade 2, 6 mm IDC with extensive treatment  effect, no LVI with 1 LN positive 4 mm, negative margins. The on 12/17/19, Dr Whyte performed right axillary dissection with pathology still pending.      11/22/2019 Cancer Staged     ypT1b N1      12/17/2019 Breast Surgery     Surgery: Dr Shima Whyte, [phone] performed right ALND with pathology showing 14 negative lymph nodes.             1/14/2020 Tumor Conference      Post mastectomy radiation therapy: Xeloda, then possible Keytruda trial .      2/3/2020 - 3/23/2020 Radiation Therapy     Treating physician: Speedy Nair MD   Total Dose: 50.4 Gy to the chest wall and regional lymphatics  10 Gy boost to the prostate.   Fractions: 28 fractions at 1.8 Gy per fraction  5 fractions at 2 Gy per fraction       2/28/2020 -  Chemotherapy     * 4/15/2020 - 9/28/20 completed 6 cycles adjuvant Xeloda 1000 mg/m2 bid days 1-14 every 21 days  Treatment Summary   Plan Name: OP CAPECITABINE 1250MG/M2 BID Q3W  Treatment Goal: Curative  Status: Active  Start Date: 3/20/2020  End Date: 3/20/2020  Provider: Richa Bahena MD  Chemotherapy: [No matching medication found in this treatment plan]            Review of Systems   Constitutional: Negative for activity change, appetite change, chills, fatigue, fever and unexpected weight change.   HENT: Negative for dental problem, postnasal drip, rhinorrhea, sinus pressure/congestion, sore throat, tinnitus, trouble swallowing and voice change.    Eyes: Negative for visual disturbance.   Respiratory: Negative for cough, shortness of breath and wheezing.    Cardiovascular: Negative for chest pain, palpitations and leg swelling.   Gastrointestinal: Positive for nausea (very minimal ). Negative for abdominal distention, abdominal pain, blood in stool, constipation, diarrhea and vomiting.   Endocrine: Negative for cold intolerance and heat intolerance.   Genitourinary: Negative for decreased urine volume, difficulty urinating, dysuria, frequency, hematuria and urgency.    Musculoskeletal: Negative for arthralgias, back pain, gait problem, joint swelling, myalgias, neck pain and neck stiffness.   Integumentary:  Negative for color change, pallor, rash and wound.   Neurological: Negative for dizziness, weakness, light-headedness, numbness and headaches.   Hematological: Negative for adenopathy. Does not bruise/bleed easily.   Psychiatric/Behavioral: Negative for dysphoric mood and sleep disturbance. The patient is nervous/anxious.          Objective:      Physical Exam  Vitals and nursing note reviewed.   Constitutional:       General: He is not in acute distress.     Appearance: Normal appearance. He is well-developed. He is obese. He is not diaphoretic.      Comments: Very pleasant  Presents walone  ECOG= 0   HENT:      Head: Normocephalic and atraumatic.   Eyes:      General: No scleral icterus.     Extraocular Movements: Extraocular movements intact.      Conjunctiva/sclera: Conjunctivae normal.      Pupils: Pupils are equal, round, and reactive to light.   Neck:      Thyroid: No thyromegaly.      Trachea: No tracheal deviation.   Cardiovascular:      Rate and Rhythm: Normal rate and regular rhythm.      Heart sounds: Normal heart sounds. No murmur heard.  No friction rub. No gallop.    Pulmonary:      Effort: Pulmonary effort is normal. No respiratory distress.      Breath sounds: Normal breath sounds. No wheezing, rhonchi or rales.   Chest:      Chest wall: No tenderness.   Abdominal:      General: Bowel sounds are normal. There is no distension.      Palpations: Abdomen is soft. There is no mass.      Tenderness: There is no abdominal tenderness. There is no guarding or rebound.      Comments: No organomegaly   Musculoskeletal:         General: No swelling, tenderness or deformity. Normal range of motion.      Cervical back: Normal range of motion and neck supple. No rigidity or tenderness.      Right lower leg: No edema.      Left lower leg: No edema.   Lymphadenopathy:       Cervical: No cervical adenopathy.   Skin:     General: Skin is warm and dry.      Coloration: Skin is not jaundiced or pale.      Findings: No erythema or rash.   Neurological:      General: No focal deficit present.      Mental Status: He is alert and oriented to person, place, and time.      Cranial Nerves: No cranial nerve deficit.      Sensory: No sensory deficit.      Motor: No weakness or abnormal muscle tone.      Coordination: Coordination normal.      Gait: Gait normal.      Deep Tendon Reflexes: Reflexes are normal and symmetric. Reflexes normal.   Psychiatric:         Mood and Affect: Mood normal.         Behavior: Behavior normal.         Thought Content: Thought content normal.         Judgment: Judgment normal.       Labs- reviewed  Assessment:       1. Malignant neoplasm involving both nipple and areola of right breast in male, estrogen receptor negative     2. Bone metastases     3. Essential hypertension     4. Chemotherapy-induced neutropenia     5. Type 2 diabetes mellitus without complication, without long-term current use of insulin     6. Uncontrolled type 2 diabetes mellitus with hyperglycemia            Plan:    1-2. Proceed with C6 of chemotherapy  - Creatinine 1.5 - hydration     3. Monitored today; continue current medication and follow up with PCP     4. Labs pending     5-6. Monitored glucose today; continue current medications and follow up with endocrinology         Return to clinic in 1 week with MD appointment and labs.     Patient is in agreement with the proposed treatment plan. All questions were answered to the patient's satisfaction. Patient knows to call clinic for any new or worsening symptoms and if anything is needed before the next clinic visit.          Michelle Grayson, FNP-C  Hematology & Medical Oncology   [address] [place] [address]  ph. [phone]  Fax. [phone]    Patient discussed with collaborating physician, Dr. Linn.    Approximately 15  minutes were spent face-to-face with the patient.  Approximately 25 minutes in total were spent on this encounter, which includes face-to-face time and non-face-to-face time preparing to see the patient (e.g., review of tests), obtaining and/or reviewing separately obtained history, documenting clinical information in the electronic or other health record, independently interpreting results (not separately reported) and communicating results to the patient/family/caregiver, or care coordination (not separately reported).         Route Chart for Scheduling    Med Onc Chart Routing  Follow up with physician 1 week.   Follow up with [facility]    Labs    Imaging PET scan   Needs PET scan ASAP and then to see Dr. Linn in 1 week    Pharmacy appointment    Other referrals          Treatment Plan Information   OP BREAST NAB-PACLITAXEL D1, D8, D15 + ALPELISIB    Rosa Linn MD   Upcoming Treatment Dates - OP BREAST NAB-PACLITAXEL D1, D8, D15 + ALPELISIB     1/14/2022       Chemotherapy       PACLitaxel-protein bound (ABRAXANE) 100 mg/m2 = 300 mg in 60 mL infusion  1/21/2022       Chemotherapy       PACLitaxel-protein bound (ABRAXANE) 100 mg/m2 = 300 mg in 60 mL infusion  1/28/2022       Chemotherapy       PACLitaxel-protein bound (ABRAXANE) 100 mg/m2 = 300 mg in 60 mL infusion    Therapy Plan Information  PORT FLUSH  Flushes  heparin, porcine (PF) 100 unit/mL injection flush 500 Units  500 Units, Intravenous, Every visit  sodium chloride 0.9% flush 10 mL  10 mL, Intravenous, Every visit    ZOLEDRONIC ACID (ZOMETA) IV  Medications  zoledronic 4 mg/100 mL infusion 4 mg  4 mg, Intravenous, Every 4 weeks  Flushes  sodium chloride 0.9% 250 mL flush bag  Intravenous, Every 4 weeks  sodium chloride 0.9% flush 10 mL  10 mL, Intravenous, Every 4 weeks  heparin, porcine (PF) 100 unit/mL injection flush 500 Units  500 Units, Intravenous, Every 4 weeks  alteplase injection 2 mg  2 mg, Intra-Catheter, Every 4 weeks

## 2022-01-19 ENCOUNTER — TELEPHONE (OUTPATIENT)
Dept: PHARMACY | Facility: CLINIC | Age: 47
End: 2022-01-19
Payer: MEDICARE

## 2022-01-19 NOTE — TELEPHONE ENCOUNTER
Cain shows the patient having Medicaid and patient stated he do not.  I explain to the patient he must provide the Medicaid denial letter to submit an application with [facility] and [employer]

## 2022-01-21 ENCOUNTER — INFUSION (OUTPATIENT)
Dept: INFUSION THERAPY | Facility: HOSPITAL | Age: 47
End: 2022-01-21
Payer: MEDICARE

## 2022-01-21 ENCOUNTER — OFFICE VISIT (OUTPATIENT)
Dept: HEMATOLOGY/ONCOLOGY | Facility: CLINIC | Age: 47
End: 2022-01-21
Payer: MEDICARE

## 2022-01-21 VITALS
RESPIRATION RATE: 16 BRPM | SYSTOLIC BLOOD PRESSURE: 130 MMHG | HEIGHT: 74 IN | OXYGEN SATURATION: 97 % | WEIGHT: 315 LBS | DIASTOLIC BLOOD PRESSURE: 64 MMHG | HEART RATE: 72 BPM | BODY MASS INDEX: 40.43 KG/M2

## 2022-01-21 VITALS
TEMPERATURE: 98 F | RESPIRATION RATE: 17 BRPM | DIASTOLIC BLOOD PRESSURE: 69 MMHG | OXYGEN SATURATION: 98 % | HEART RATE: 69 BPM | SYSTOLIC BLOOD PRESSURE: 135 MMHG

## 2022-01-21 DIAGNOSIS — E11.65 UNCONTROLLED TYPE 2 DIABETES MELLITUS WITH HYPERGLYCEMIA: ICD-10-CM

## 2022-01-21 DIAGNOSIS — Z17.1 MALIGNANT NEOPLASM INVOLVING BOTH NIPPLE AND AREOLA OF RIGHT BREAST IN MALE, ESTROGEN RECEPTOR NEGATIVE: Primary | ICD-10-CM

## 2022-01-21 DIAGNOSIS — G89.3 NEOPLASM RELATED PAIN: ICD-10-CM

## 2022-01-21 DIAGNOSIS — C79.51 BONE METASTASES: ICD-10-CM

## 2022-01-21 DIAGNOSIS — C50.021 MALIGNANT NEOPLASM INVOLVING BOTH NIPPLE AND AREOLA OF RIGHT BREAST IN MALE, ESTROGEN RECEPTOR NEGATIVE: Primary | ICD-10-CM

## 2022-01-21 DIAGNOSIS — E11.9 TYPE 2 DIABETES MELLITUS WITHOUT COMPLICATION, WITHOUT LONG-TERM CURRENT USE OF INSULIN: ICD-10-CM

## 2022-01-21 DIAGNOSIS — D70.1 CHEMOTHERAPY-INDUCED NEUTROPENIA: ICD-10-CM

## 2022-01-21 DIAGNOSIS — I10 ESSENTIAL HYPERTENSION: ICD-10-CM

## 2022-01-21 DIAGNOSIS — T45.1X5A CHEMOTHERAPY-INDUCED NEUTROPENIA: ICD-10-CM

## 2022-01-21 PROCEDURE — 1160F PR REVIEW ALL MEDS BY PRESCRIBER/CLIN PHARMACIST DOCUMENTED: ICD-10-PCS | Mod: CPTII,S$GLB,, | Performed by: NURSE PRACTITIONER

## 2022-01-21 PROCEDURE — 63600175 PHARM REV CODE 636 W HCPCS: Performed by: INTERNAL MEDICINE

## 2022-01-21 PROCEDURE — 1159F MED LIST DOCD IN RCRD: CPT | Mod: CPTII,S$GLB,, | Performed by: NURSE PRACTITIONER

## 2022-01-21 PROCEDURE — 96413 CHEMO IV INFUSION 1 HR: CPT

## 2022-01-21 PROCEDURE — 3008F PR BODY MASS INDEX (BMI) DOCUMENTED: ICD-10-PCS | Mod: CPTII,S$GLB,, | Performed by: NURSE PRACTITIONER

## 2022-01-21 PROCEDURE — 1159F PR MEDICATION LIST DOCUMENTED IN MEDICAL RECORD: ICD-10-PCS | Mod: CPTII,S$GLB,, | Performed by: NURSE PRACTITIONER

## 2022-01-21 PROCEDURE — 3075F PR MOST RECENT SYSTOLIC BLOOD PRESS GE 130-139MM HG: ICD-10-PCS | Mod: CPTII,S$GLB,, | Performed by: NURSE PRACTITIONER

## 2022-01-21 PROCEDURE — 99999 PR PBB SHADOW E&M-EST. PATIENT-LVL V: ICD-10-PCS | Mod: PBBFAC,,, | Performed by: NURSE PRACTITIONER

## 2022-01-21 PROCEDURE — 3008F BODY MASS INDEX DOCD: CPT | Mod: CPTII,S$GLB,, | Performed by: NURSE PRACTITIONER

## 2022-01-21 PROCEDURE — 3078F DIAST BP <80 MM HG: CPT | Mod: CPTII,S$GLB,, | Performed by: NURSE PRACTITIONER

## 2022-01-21 PROCEDURE — 99215 PR OFFICE/OUTPT VISIT, EST, LEVL V, 40-54 MIN: ICD-10-PCS | Mod: S$GLB,,, | Performed by: NURSE PRACTITIONER

## 2022-01-21 PROCEDURE — 99215 OFFICE O/P EST HI 40 MIN: CPT | Mod: S$GLB,,, | Performed by: NURSE PRACTITIONER

## 2022-01-21 PROCEDURE — 3078F PR MOST RECENT DIASTOLIC BLOOD PRESSURE < 80 MM HG: ICD-10-PCS | Mod: CPTII,S$GLB,, | Performed by: NURSE PRACTITIONER

## 2022-01-21 PROCEDURE — 3075F SYST BP GE 130 - 139MM HG: CPT | Mod: CPTII,S$GLB,, | Performed by: NURSE PRACTITIONER

## 2022-01-21 PROCEDURE — 1160F RVW MEDS BY RX/DR IN RCRD: CPT | Mod: CPTII,S$GLB,, | Performed by: NURSE PRACTITIONER

## 2022-01-21 PROCEDURE — 25000003 PHARM REV CODE 250: Performed by: INTERNAL MEDICINE

## 2022-01-21 PROCEDURE — 99999 PR PBB SHADOW E&M-EST. PATIENT-LVL V: CPT | Mod: PBBFAC,,, | Performed by: NURSE PRACTITIONER

## 2022-01-21 PROCEDURE — 96367 TX/PROPH/DG ADDL SEQ IV INF: CPT

## 2022-01-21 RX ORDER — HEPARIN 100 UNIT/ML
500 SYRINGE INTRAVENOUS
Status: CANCELLED | OUTPATIENT
Start: 2022-02-11

## 2022-01-21 RX ORDER — SODIUM CHLORIDE 0.9 % (FLUSH) 0.9 %
10 SYRINGE (ML) INJECTION
Status: CANCELLED | OUTPATIENT
Start: 2022-02-11

## 2022-01-21 RX ORDER — SODIUM CHLORIDE 9 MG/ML
INJECTION, SOLUTION INTRAVENOUS CONTINUOUS
Status: DISCONTINUED | OUTPATIENT
Start: 2022-01-21 | End: 2022-01-21 | Stop reason: HOSPADM

## 2022-01-21 RX ORDER — HEPARIN 100 UNIT/ML
500 SYRINGE INTRAVENOUS
Status: CANCELLED | OUTPATIENT
Start: 2022-01-21

## 2022-01-21 RX ORDER — ZOLEDRONIC ACID 0.04 MG/ML
4 INJECTION, SOLUTION INTRAVENOUS
Status: CANCELLED | OUTPATIENT
Start: 2022-02-11

## 2022-01-21 RX ORDER — SODIUM CHLORIDE 0.9 % (FLUSH) 0.9 %
10 SYRINGE (ML) INJECTION
Status: CANCELLED | OUTPATIENT
Start: 2022-01-21

## 2022-01-21 RX ORDER — SODIUM CHLORIDE 0.9 % (FLUSH) 0.9 %
10 SYRINGE (ML) INJECTION
Status: DISCONTINUED | OUTPATIENT
Start: 2022-01-21 | End: 2022-01-21 | Stop reason: HOSPADM

## 2022-01-21 RX ORDER — HEPARIN 100 UNIT/ML
500 SYRINGE INTRAVENOUS
Status: DISCONTINUED | OUTPATIENT
Start: 2022-01-21 | End: 2022-01-21 | Stop reason: HOSPADM

## 2022-01-21 RX ADMIN — SODIUM CHLORIDE: 0.9 INJECTION, SOLUTION INTRAVENOUS at 03:01

## 2022-01-21 RX ADMIN — PACLITAXEL 300 MG: 100 INJECTION, POWDER, LYOPHILIZED, FOR SUSPENSION INTRAVENOUS at 05:01

## 2022-01-21 RX ADMIN — HEPARIN 500 UNITS: 100 SYRINGE at 06:01

## 2022-01-21 RX ADMIN — ZOLEDRONIC ACID 4 MG: 4 INJECTION INTRAVENOUS at 05:01

## 2022-01-22 NOTE — PLAN OF CARE
Pt ambulatory to clinic today alone for Abraxane and Zometa infusions with IVF's. Denies any complaints. Finger laceration healing well. No redness, swelling or drainage. Port accessed without difficulty. Good blood return. Pt tolerated infusion well. Port deaccessed after flushing. Pt ambulatory from clinic in [facility].

## 2022-01-24 RX ORDER — HYDROCODONE BITARTRATE AND ACETAMINOPHEN 10; 325 MG/1; MG/1
1 TABLET ORAL EVERY 6 HOURS PRN
Qty: 90 TABLET | Refills: 0 | Status: SHIPPED | OUTPATIENT
Start: 2022-01-24 | End: 2022-03-10 | Stop reason: SDUPTHER

## 2022-01-25 ENCOUNTER — PATIENT MESSAGE (OUTPATIENT)
Dept: HEMATOLOGY/ONCOLOGY | Facility: CLINIC | Age: 47
End: 2022-01-25
Payer: MEDICARE

## 2022-01-25 DIAGNOSIS — Z17.1 MALIGNANT NEOPLASM INVOLVING BOTH NIPPLE AND AREOLA OF RIGHT BREAST IN MALE, ESTROGEN RECEPTOR NEGATIVE: Primary | ICD-10-CM

## 2022-01-25 DIAGNOSIS — C50.021 MALIGNANT NEOPLASM INVOLVING BOTH NIPPLE AND AREOLA OF RIGHT BREAST IN MALE, ESTROGEN RECEPTOR NEGATIVE: Primary | ICD-10-CM

## 2022-01-28 ENCOUNTER — OFFICE VISIT (OUTPATIENT)
Dept: HEMATOLOGY/ONCOLOGY | Facility: CLINIC | Age: 47
End: 2022-01-28
Payer: MEDICARE

## 2022-01-28 ENCOUNTER — INFUSION (OUTPATIENT)
Dept: INFUSION THERAPY | Facility: HOSPITAL | Age: 47
End: 2022-01-28
Payer: MEDICARE

## 2022-01-28 VITALS
SYSTOLIC BLOOD PRESSURE: 132 MMHG | HEART RATE: 64 BPM | RESPIRATION RATE: 18 BRPM | TEMPERATURE: 98 F | DIASTOLIC BLOOD PRESSURE: 57 MMHG

## 2022-01-28 VITALS
BODY MASS INDEX: 40.43 KG/M2 | WEIGHT: 315 LBS | RESPIRATION RATE: 16 BRPM | OXYGEN SATURATION: 98 % | HEIGHT: 74 IN | DIASTOLIC BLOOD PRESSURE: 76 MMHG | HEART RATE: 63 BPM | SYSTOLIC BLOOD PRESSURE: 134 MMHG | TEMPERATURE: 98 F

## 2022-01-28 DIAGNOSIS — C50.021 MALIGNANT NEOPLASM INVOLVING BOTH NIPPLE AND AREOLA OF RIGHT BREAST IN MALE, ESTROGEN RECEPTOR NEGATIVE: Primary | ICD-10-CM

## 2022-01-28 DIAGNOSIS — E11.9 TYPE 2 DIABETES MELLITUS WITHOUT COMPLICATION, UNSPECIFIED WHETHER LONG TERM INSULIN USE: ICD-10-CM

## 2022-01-28 DIAGNOSIS — Z17.1 MALIGNANT NEOPLASM INVOLVING BOTH NIPPLE AND AREOLA OF RIGHT BREAST IN MALE, ESTROGEN RECEPTOR NEGATIVE: Primary | ICD-10-CM

## 2022-01-28 DIAGNOSIS — E66.01 MORBID OBESITY: ICD-10-CM

## 2022-01-28 DIAGNOSIS — C79.51 BONE METASTASES: ICD-10-CM

## 2022-01-28 DIAGNOSIS — D70.1 CHEMOTHERAPY-INDUCED NEUTROPENIA: ICD-10-CM

## 2022-01-28 DIAGNOSIS — T45.1X5A CHEMOTHERAPY-INDUCED NEUTROPENIA: ICD-10-CM

## 2022-01-28 PROCEDURE — 3078F DIAST BP <80 MM HG: CPT | Mod: CPTII,S$GLB,, | Performed by: INTERNAL MEDICINE

## 2022-01-28 PROCEDURE — 25000003 PHARM REV CODE 250: Performed by: INTERNAL MEDICINE

## 2022-01-28 PROCEDURE — 99999 PR PBB SHADOW E&M-EST. PATIENT-LVL V: CPT | Mod: PBBFAC,,, | Performed by: INTERNAL MEDICINE

## 2022-01-28 PROCEDURE — 1160F PR REVIEW ALL MEDS BY PRESCRIBER/CLIN PHARMACIST DOCUMENTED: ICD-10-PCS | Mod: CPTII,S$GLB,, | Performed by: INTERNAL MEDICINE

## 2022-01-28 PROCEDURE — 3078F PR MOST RECENT DIASTOLIC BLOOD PRESSURE < 80 MM HG: ICD-10-PCS | Mod: CPTII,S$GLB,, | Performed by: INTERNAL MEDICINE

## 2022-01-28 PROCEDURE — 3075F SYST BP GE 130 - 139MM HG: CPT | Mod: CPTII,S$GLB,, | Performed by: INTERNAL MEDICINE

## 2022-01-28 PROCEDURE — 1159F PR MEDICATION LIST DOCUMENTED IN MEDICAL RECORD: ICD-10-PCS | Mod: CPTII,S$GLB,, | Performed by: INTERNAL MEDICINE

## 2022-01-28 PROCEDURE — 99999 PR PBB SHADOW E&M-EST. PATIENT-LVL V: ICD-10-PCS | Mod: PBBFAC,,, | Performed by: INTERNAL MEDICINE

## 2022-01-28 PROCEDURE — 96360 HYDRATION IV INFUSION INIT: CPT

## 2022-01-28 PROCEDURE — 99214 OFFICE O/P EST MOD 30 MIN: CPT | Mod: S$GLB,,, | Performed by: INTERNAL MEDICINE

## 2022-01-28 PROCEDURE — 99214 PR OFFICE/OUTPT VISIT, EST, LEVL IV, 30-39 MIN: ICD-10-PCS | Mod: S$GLB,,, | Performed by: INTERNAL MEDICINE

## 2022-01-28 PROCEDURE — 96413 CHEMO IV INFUSION 1 HR: CPT

## 2022-01-28 PROCEDURE — 3075F PR MOST RECENT SYSTOLIC BLOOD PRESS GE 130-139MM HG: ICD-10-PCS | Mod: CPTII,S$GLB,, | Performed by: INTERNAL MEDICINE

## 2022-01-28 PROCEDURE — 3008F BODY MASS INDEX DOCD: CPT | Mod: CPTII,S$GLB,, | Performed by: INTERNAL MEDICINE

## 2022-01-28 PROCEDURE — A4216 STERILE WATER/SALINE, 10 ML: HCPCS | Performed by: INTERNAL MEDICINE

## 2022-01-28 PROCEDURE — 63600175 PHARM REV CODE 636 W HCPCS: Mod: JG | Performed by: INTERNAL MEDICINE

## 2022-01-28 PROCEDURE — 1160F RVW MEDS BY RX/DR IN RCRD: CPT | Mod: CPTII,S$GLB,, | Performed by: INTERNAL MEDICINE

## 2022-01-28 PROCEDURE — 1159F MED LIST DOCD IN RCRD: CPT | Mod: CPTII,S$GLB,, | Performed by: INTERNAL MEDICINE

## 2022-01-28 PROCEDURE — 99499 RISK ADDL DX/OHS AUDIT: ICD-10-PCS | Mod: S$GLB,,, | Performed by: INTERNAL MEDICINE

## 2022-01-28 PROCEDURE — 3008F PR BODY MASS INDEX (BMI) DOCUMENTED: ICD-10-PCS | Mod: CPTII,S$GLB,, | Performed by: INTERNAL MEDICINE

## 2022-01-28 PROCEDURE — 99499 UNLISTED E&M SERVICE: CPT | Mod: S$GLB,,, | Performed by: INTERNAL MEDICINE

## 2022-01-28 PROCEDURE — 96361 HYDRATE IV INFUSION ADD-ON: CPT

## 2022-01-28 RX ORDER — SODIUM CHLORIDE 0.9 % (FLUSH) 0.9 %
10 SYRINGE (ML) INJECTION
Status: DISCONTINUED | OUTPATIENT
Start: 2022-01-28 | End: 2022-01-28 | Stop reason: HOSPADM

## 2022-01-28 RX ORDER — HEPARIN 100 UNIT/ML
500 SYRINGE INTRAVENOUS
Status: CANCELLED | OUTPATIENT
Start: 2022-01-28

## 2022-01-28 RX ORDER — SODIUM CHLORIDE 9 MG/ML
INJECTION, SOLUTION INTRAVENOUS CONTINUOUS
Status: DISCONTINUED | OUTPATIENT
Start: 2022-01-28 | End: 2022-01-28 | Stop reason: HOSPADM

## 2022-01-28 RX ORDER — HEPARIN 100 UNIT/ML
500 SYRINGE INTRAVENOUS
Status: DISCONTINUED | OUTPATIENT
Start: 2022-01-28 | End: 2022-01-28 | Stop reason: HOSPADM

## 2022-01-28 RX ORDER — SODIUM CHLORIDE 0.9 % (FLUSH) 0.9 %
10 SYRINGE (ML) INJECTION
Status: CANCELLED | OUTPATIENT
Start: 2022-01-28

## 2022-01-28 RX ORDER — SODIUM CHLORIDE 9 MG/ML
INJECTION, SOLUTION INTRAVENOUS CONTINUOUS
Status: CANCELLED | OUTPATIENT
Start: 2022-01-28

## 2022-01-28 RX ADMIN — Medication 10 ML: at 04:01

## 2022-01-28 RX ADMIN — SODIUM CHLORIDE: 0.9 INJECTION, SOLUTION INTRAVENOUS at 03:01

## 2022-01-28 RX ADMIN — SODIUM CHLORIDE: 0.9 INJECTION, SOLUTION INTRAVENOUS at 02:01

## 2022-01-28 RX ADMIN — HEPARIN 500 UNITS: 100 SYRINGE at 04:01

## 2022-01-28 RX ADMIN — PACLITAXEL 300 MG: 100 INJECTION, POWDER, LYOPHILIZED, FOR SUSPENSION INTRAVENOUS at 03:01

## 2022-01-28 NOTE — PROGRESS NOTES
Subjective:       Patient ID: Paul Li Jr. is a 46 y.o. male.    Chief Complaint: Metastatic breast cancer, negative genetic testing       Returns for follow up and C6D8 Abraxane.   Appetite is good.   Reports personal stressors  Hand better and stitches out today  Denies pain  No n/v/d  Denies fevers, chills or signs of infection     Diagnosis:  Metastatic TNBC progressed (prior Stage IB ([id number]) triple negative invasive ductal carcinoma with lobular features of right breast, retroareolar, Grade 2, Ki67 80%, diagnosed 2019)     Oncology History:  Metastatic  Set up for scans (due to back pain) which are consistent for recurrence.   Imagin/3/2021 MRI T/L spine:  FINDINGS:  Alignment: Within normal limits.  Vertebrae: Low T1 signal intensity in the left T12 vertebral body extending to the left pedicle, S1 and S2 vertebral bodies with abnormal enhancement.  The vertebral heights are well maintained.  No evidence of acute fractures.  Abnormal appearance of the LEFT sacrum and ilium as well.  Discs: Degenerative disease of the level of L4-L5 with posterior annular fissure.  Conus appears within normal limits terminating at the level of the L2. level.  Cauda equina appears unremarkable.  Mild circumferential disc bulging at the level of T10-T11 abutting the ventral thecal sac.  No significant spinal canal stenosis or neural foraminal narrowing throughout the thoracic spine.  No significant spinal canal stenosis or neural foraminal narrowing throughout the lumbar spine.  Paraspinous muscles and soft tissues: Subcentimeter focal enhancement in the posterior column along the supraspinous ligament at the level of L1-L2 (sagittal series 21, image 10) potentially inflammatory versus neoplastic.  Impression:  Abnormal appearance of the T12, S1, S2 vertebral bodies as well as LEFT sacrum and ilium concerning for metastatic disease in this patient with history of breast cancer.  No evidence for significant central  canal narrowing.  Additional findings, as above.  This report was flagged in Epic as abnormal.     6/9/2021 PET scan:  COMPARISON:  MRI thoracic and lumbar spine 06/03/2021  FINDINGS:  Quality of the study: Adequate.  In the head and neck, there are no hypermetabolic lesions worrisome for malignancy. There are no hypermetabolic mucosal lesions, and there are no pathologically enlarged or hypermetabolic lymph nodes.  In the chest, there is postoperative change of right mastectomy/right axillary lymph node dissection.  There is low level hypermetabolic activity with mild soft tissue thickening in the skin/superficial subcutaneous fat of the right chest wall (axial fused image 78) with SUV max 3.6.  There is soft tissue thickening measuring approximately 1.8 x 7.9 cm in the subcutaneous fat of the right chest wall (axial series 3, image 84) with SUV max 3.1.  There are a few bilateral pulmonary nodules measuring up to 0.3 cm in the left lung (axial series 3, image 88) and 0.5 cm in the right lung (axial series 3, image 84).  These nodules are too small to characterize by PET.  There are no pathologically enlarged or hypermetabolic lymph nodes.  In the abdomen and pelvis, there is physiologic tracer distribution within the abdominal organs and excretion into the genitourinary system.  Subtle sen mesentery and multiple prominent mesenteric lymph nodes measuring up to 1.8 cm in long axis with background activity.  In the bones, there are several hypermetabolic lesions worrisome for malignancy.  Sclerotic lesion in the left T12 vertebral body (axial series 3, image 131) with SUV max 12.  Sclerotic lesions in the sacrum (for example axial series 3, image 188) with SUV max 9.7.  Sclerotic lesions in the left iliac bone (for example axial series 3, image 205) with SUV max 6.  In the extremities, there are no hypermetabolic lesions worrisome for malignancy.  Incidental findings:  Bilateral maxillary antra mucous retention  cysts.  Fat containing right inguinal hernia.  Impression:  1. In this patient with right breast carcinoma status post mastectomy/right axillary lymph node dissection, there are multiple sclerotic lesions in the T12 vertebral body, sacrum, and left iliac bone with hypermetabolic activity concerning for active metastatic disease and in keeping with findings on MRI 06/03/2021.  2. Additionally there is low-level hypermetabolic activity with soft tissue thickening in the right chest wall as detailed above.  These findings are nonspecific and may be related to postoperative/post radiation change, with recurrent malignancy not excluded.  3. Nonspecific mesenteric haziness and lymph nodes which may indicate mesenteric adenitis.  Recommend clinical correlation and attention on follow-up.  4. Additional findings as above.  I, Sohan Tillman MD, attest that I reviewed and interpreted the images.     Most recent imaging:  10/5/2021 PET:   FINDINGS:  Quality of the study: Adequate.  In the head and neck, there are no hypermetabolic lesions worrisome for malignancy. There are no hypermetabolic mucosal lesions, and there are no pathologically enlarged or hypermetabolic lymph nodes.  In the chest, no postsurgical changes of right mastectomy/right axillary lymph node dissection.  There are no hypermetabolic lesions worrisome for malignancy.  There are no concerning pulmonary nodules or masses, and there are no pathologically enlarged or hypermetabolic lymph nodes.  Stable subcentimeter bilateral pulmonary micro nodules too small for metabolic assessment with PET.  For reference, right lung axial series 2, image 83.  In the abdomen and pelvis, there is physiologic tracer distribution within the abdominal organs and excretion into the genitourinary system.  In the bones, there are multiple hypermetabolic lesions consistent with viable metastases.  Index lesions as follows:  1. Sclerotic lesion T12 vertebral body (axial series 2, image  125).  SUV max of 8.3 (previously 12).  2. Diffuse ill-defined sclerosis of the sacrum(axial fused images 195 and 200).  SUV max of 6.6 (previously 9.7).  3. Sclerotic lesion left iliac bone (axial series 2, image 201).  SUV max of 5.8 (previously 6).  Impression:  Hypermetabolic osseous metastases consistent with partial response to treatment and persistent viable disease.  No new hypermetabolic osseous lesions.  Stable subcentimeter pulmonary nodules too small for metabolic assessment with PET.  Attention on follow-up.     In summary,   Started aplelisib 8/22/2021   Abraxane C1 started 8/13/2021  We had sent Guardant and discussed results with Molecular tumor board  He also had a repeat bx to confirm metastatic triple negative breast cancer  Plan:   Nab-Paclitaxel and Alpelisib  Reference: https://ascopubs.org/doi/abs/10.1200/JCO.[email or web address]_suppl.1018  Also on Zometa-      - C1D1 Abraxane 8/13/2021  - Started aplelisib 8/22/2021               Oncology History   Malignant neoplasm involving both nipple and areola of right breast in male, estrogen receptor negative   5/1/2019 Imaging Significant Findings     Mammogram and US  Impression:  Right  Mass: Right breast 14 mm mass at the retroareolar anterior position. Assessment: 4 - Suspicious finding. Biopsy is recommended.   Left  There is no mammographic or sonographic evidence of malignancy.  Recommendation:  Biopsy is recommended.      5/2/2019 Biopsy     BREAST, RIGHT, RETROAREOLAR MASS, ULTRASOUND-GUIDED BIOPSY:  Invasive ductal carcinoma with lobular features.  Size of invasive carcinoma: 5 MM in greatest linear dimension within a single      5/2/2019 Breast Tumor Markers     Estrogen: Negative  Progesterone: Negative  HER2: Negative  Ki67: 80      5/17/2019 Cancer Staged     Staging form: Breast, AJCC 8th Edition  - Clinical stage from 5/17/2019: Stage IB (cT1c, cN0, cM0, G2, ER-, AL-, HER2-) - Signed by Richa Bahena MD on 5/17/2019 5/27/2019 -  7/21/2019 Chemotherapy     Treatment Summary   Plan Name: OP BREAST DOSE-DENSE AC - DOXORUBICIN CYCLOPHOSPHAMIDE Q2W  Treatment Goal: Curative  Status: Inactive  Start Date: 5/27/2019  End Date: 7/9/2019  Provider: Richa Bahena MD  Chemotherapy: DOXOrubicin chemo injection 186 mg, 60 mg/m2 = 186 mg, Intravenous, Clinic/HOD 1 time, 4 of 4 cycles  Administration: 186 mg (5/27/2019), 186 mg (6/10/2019), 186 mg (6/24/2019), 186 mg (7/8/2019)  cyclophosphamide (CYTOXAN) 600 mg/m2 = 1,865 mg in sodium chloride 0.9% 250 mL chemo infusion, 600 mg/m2 = 1,865 mg, Intravenous, Clinic/HOD 1 time, 4 of 4 cycles  Administration: 1,865 mg (5/27/2019), 1,865 mg (6/10/2019), 1,865 mg (6/24/2019), 1,865 mg (7/8/2019)      7/22/2019 - 10/15/2019 Chemotherapy     Treatment Summary   Plan Name: OP PACLITAXEL QW  Treatment Goal: Curative  Status: Inactive  Start Date: 7/24/2019  End Date: 10/8/2019  Provider: Richa Bahena MD  Chemotherapy: PACLitaxel (TAXOL) 80 mg/m2 = 246 mg in sodium chloride 0.9% 250 mL chemo infusion, 80 mg/m2 = 246 mg, Intravenous, Clinic/HOD 1 time, 12 of 12 cycles  Dose modification: 60 mg/m2 (original dose 80 mg/m2, Cycle 3), 55 mg/m2 (original dose 80 mg/m2, Cycle 7), 60 mg/m2 (original dose 80 mg/m2, Cycle 7), 50 mg/m2 (original dose 80 mg/m2, Cycle 9), 50 mg/m2 (original dose 80 mg/m2, Cycle 10)  Administration: 246 mg (7/24/2019), 246 mg (7/31/2019), 186 mg (8/7/2019), 186 mg (8/14/2019), 186 mg (8/21/2019), 186 mg (8/28/2019), 186 mg (9/4/2019), 174 mg (9/11/2019), 156 mg (9/18/2019), 156 mg (9/25/2019), 156 mg (10/1/2019), 156 mg (10/8/2019)      11/22/2019 Breast Surgery     On 11/22/19 Dr whyte performed a right breast mastectomy with SLN biopsy with pathology showing grade 2, 6 mm IDC with extensive treatment effect, no LVI with 1 LN positive 4 mm, negative margins. The on 12/17/19, Dr Whyte performed right axillary dissection with pathology still pending.      11/22/2019 Cancer Staged      ypT1b N1      12/17/2019 Breast Surgery     Surgery: Dr Shima Whyte, [phone] performed right ALND with pathology showing 14 negative lymph nodes.             1/14/2020 Tumor Conference      Post mastectomy radiation therapy: Xeloda, then possible Keytruda trial .      2/3/2020 - 3/23/2020 Radiation Therapy     Treating physician: Speedy Nair MD   Total Dose: 50.4 Gy to the chest wall and regional lymphatics  10 Gy boost to the prostate.   Fractions: 28 fractions at 1.8 Gy per fraction  5 fractions at 2 Gy per fraction       2/28/2020 -  Chemotherapy     * 4/15/2020 - 9/28/20 completed 6 cycles adjuvant Xeloda 1000 mg/m2 bid days 1-14 every 21 days  Treatment Summary   Plan Name: OP CAPECITABINE 1250MG/M2 BID Q3W  Treatment Goal: Curative  Status: Active  Start Date: 3/20/2020  End Date: 3/20/2020  Provider: Richa Bahena MD  Chemotherapy: [No matching medication found in this treatment plan]               Review of Systems   Constitutional: Negative for activity change, appetite change, chills, fatigue, fever and unexpected weight change.   HENT: Negative for dental problem, postnasal drip, rhinorrhea, sinus pressure/congestion, sore throat, tinnitus, trouble swallowing and voice change.    Eyes: Negative for visual disturbance.   Respiratory: Negative for cough, shortness of breath and wheezing.    Cardiovascular: Negative for chest pain, palpitations and leg swelling.   Gastrointestinal: Negative for abdominal distention, abdominal pain, blood in stool, constipation, diarrhea, nausea and vomiting.   Endocrine: Negative for cold intolerance and heat intolerance.   Genitourinary: Negative for decreased urine volume, difficulty urinating, dysuria, frequency, hematuria and urgency.   Musculoskeletal: Negative for arthralgias, back pain, gait problem, joint swelling, myalgias, neck pain and neck stiffness.   Integumentary:  Negative for color change, pallor, rash and wound.   Neurological: Negative for  dizziness, weakness, light-headedness, numbness and headaches.   Hematological: Negative for adenopathy. Does not bruise/bleed easily.   Psychiatric/Behavioral: Negative for dysphoric mood and sleep disturbance. The patient is nervous/anxious.          Objective:      Physical Exam  Vitals and nursing note reviewed.   Constitutional:       General: He is not in acute distress.     Appearance: Normal appearance. He is well-developed. He is obese. He is not diaphoretic.      Comments: Very pleasant  Presents alone  ECOG= 0   HENT:      Head: Normocephalic and atraumatic.   Eyes:      General: No scleral icterus.     Extraocular Movements: Extraocular movements intact.      Conjunctiva/sclera: Conjunctivae normal.      Pupils: Pupils are equal, round, and reactive to light.   Neck:      Thyroid: No thyromegaly.      Trachea: No tracheal deviation.   Cardiovascular:      Rate and Rhythm: Normal rate and regular rhythm.      Heart sounds: Normal heart sounds. No murmur heard.  No friction rub. No gallop.    Pulmonary:      Effort: Pulmonary effort is normal. No respiratory distress.      Breath sounds: Normal breath sounds. No wheezing, rhonchi or rales.   Chest:      Chest wall: No tenderness.   Abdominal:      General: Bowel sounds are normal. There is no distension.      Palpations: Abdomen is soft. There is no mass.      Tenderness: There is no abdominal tenderness. There is no guarding or rebound.      Comments: No organomegaly   Musculoskeletal:         General: No swelling, tenderness or deformity. Normal range of motion.      Cervical back: Normal range of motion and neck supple. No rigidity or tenderness.      Right lower leg: No edema.      Left lower leg: No edema.   Lymphadenopathy:      Cervical: No cervical adenopathy.   Skin:     General: Skin is warm and dry.      Coloration: Skin is not jaundiced or pale.      Findings: No erythema or rash.   Neurological:      General: No focal deficit present.       Mental Status: He is alert and oriented to person, place, and time.      Cranial Nerves: No cranial nerve deficit.      Sensory: No sensory deficit.      Motor: No weakness or abnormal muscle tone.      Coordination: Coordination normal.      Gait: Gait normal.      Deep Tendon Reflexes: Reflexes are normal and symmetric. Reflexes normal.   Psychiatric:         Mood and Affect: Mood normal.         Behavior: Behavior normal.         Thought Content: Thought content normal.         Judgment: Judgment normal.       Labs- reviewed  Assessment:       Problem List Items Addressed This Visit     Type 2 diabetes mellitus without complication    Morbid obesity    Malignant neoplasm involving both nipple and areola of right breast in male, estrogen receptor negative - Primary    Chemotherapy-induced neutropenia    Bone metastases          Plan:     DM- adequate control  Obesity- stable  Good albumin  Metastatic breast cancer- repeat scans after D15 of this ccyle (post 2/4)  ANC- 1400 neupogen to be offered    Needs pyschology referral

## 2022-01-28 NOTE — PLAN OF CARE
Abraxane complete tolerated well, PAC flushed hep locked de accessed. D/c home ambulated away independently scheduled to rtn for udenyca inj 1/29/22 at 1pm.

## 2022-01-29 ENCOUNTER — INFUSION (OUTPATIENT)
Dept: INFUSION THERAPY | Facility: HOSPITAL | Age: 47
End: 2022-01-29
Attending: INTERNAL MEDICINE
Payer: MEDICARE

## 2022-01-29 VITALS — WEIGHT: 315 LBS | HEIGHT: 74 IN | BODY MASS INDEX: 40.43 KG/M2

## 2022-01-29 DIAGNOSIS — Z17.1 MALIGNANT NEOPLASM INVOLVING BOTH NIPPLE AND AREOLA OF RIGHT BREAST IN MALE, ESTROGEN RECEPTOR NEGATIVE: Primary | ICD-10-CM

## 2022-01-29 DIAGNOSIS — C50.021 MALIGNANT NEOPLASM INVOLVING BOTH NIPPLE AND AREOLA OF RIGHT BREAST IN MALE, ESTROGEN RECEPTOR NEGATIVE: Primary | ICD-10-CM

## 2022-01-29 PROCEDURE — 96372 THER/PROPH/DIAG INJ SC/IM: CPT

## 2022-01-29 PROCEDURE — 63600175 PHARM REV CODE 636 W HCPCS: Mod: JG | Performed by: INTERNAL MEDICINE

## 2022-01-29 RX ADMIN — FILGRASTIM 480 MCG: 480 INJECTION, SOLUTION INTRAVENOUS; SUBCUTANEOUS at 11:01

## 2022-01-29 NOTE — NURSING
1150 pt here for neupogen injection, tolerated well to abdomen, no distress noted upon d/c to home

## 2022-02-04 ENCOUNTER — INFUSION (OUTPATIENT)
Dept: INFUSION THERAPY | Facility: HOSPITAL | Age: 47
End: 2022-02-04
Payer: MEDICARE

## 2022-02-04 ENCOUNTER — TELEPHONE (OUTPATIENT)
Dept: HEMATOLOGY/ONCOLOGY | Facility: CLINIC | Age: 47
End: 2022-02-04
Payer: MEDICARE

## 2022-02-04 ENCOUNTER — PATIENT MESSAGE (OUTPATIENT)
Dept: HEMATOLOGY/ONCOLOGY | Facility: CLINIC | Age: 47
End: 2022-02-04
Payer: MEDICARE

## 2022-02-04 VITALS
RESPIRATION RATE: 18 BRPM | HEART RATE: 58 BPM | DIASTOLIC BLOOD PRESSURE: 82 MMHG | HEIGHT: 74 IN | SYSTOLIC BLOOD PRESSURE: 132 MMHG | TEMPERATURE: 99 F | WEIGHT: 315 LBS | BODY MASS INDEX: 40.43 KG/M2

## 2022-02-04 DIAGNOSIS — Z17.1 MALIGNANT NEOPLASM INVOLVING BOTH NIPPLE AND AREOLA OF RIGHT BREAST IN MALE, ESTROGEN RECEPTOR NEGATIVE: Primary | ICD-10-CM

## 2022-02-04 DIAGNOSIS — C50.021 MALIGNANT NEOPLASM INVOLVING BOTH NIPPLE AND AREOLA OF RIGHT BREAST IN MALE, ESTROGEN RECEPTOR NEGATIVE: Primary | ICD-10-CM

## 2022-02-04 PROCEDURE — 96413 CHEMO IV INFUSION 1 HR: CPT

## 2022-02-04 PROCEDURE — 63600175 PHARM REV CODE 636 W HCPCS: Mod: JG | Performed by: INTERNAL MEDICINE

## 2022-02-04 PROCEDURE — 96361 HYDRATE IV INFUSION ADD-ON: CPT

## 2022-02-04 PROCEDURE — 25000003 PHARM REV CODE 250: Performed by: INTERNAL MEDICINE

## 2022-02-04 PROCEDURE — 96360 HYDRATION IV INFUSION INIT: CPT

## 2022-02-04 RX ORDER — HEPARIN 100 UNIT/ML
500 SYRINGE INTRAVENOUS
Status: CANCELLED | OUTPATIENT
Start: 2022-02-04

## 2022-02-04 RX ORDER — SODIUM CHLORIDE 0.9 % (FLUSH) 0.9 %
10 SYRINGE (ML) INJECTION
Status: CANCELLED | OUTPATIENT
Start: 2022-02-04

## 2022-02-04 RX ORDER — SODIUM CHLORIDE 9 MG/ML
INJECTION, SOLUTION INTRAVENOUS CONTINUOUS
Status: DISCONTINUED | OUTPATIENT
Start: 2022-02-04 | End: 2022-02-04 | Stop reason: HOSPADM

## 2022-02-04 RX ORDER — HEPARIN 100 UNIT/ML
500 SYRINGE INTRAVENOUS
Status: DISCONTINUED | OUTPATIENT
Start: 2022-02-04 | End: 2022-02-04 | Stop reason: HOSPADM

## 2022-02-04 RX ORDER — SODIUM CHLORIDE 9 MG/ML
INJECTION, SOLUTION INTRAVENOUS CONTINUOUS
Status: CANCELLED | OUTPATIENT
Start: 2022-02-04

## 2022-02-04 RX ORDER — SODIUM CHLORIDE 0.9 % (FLUSH) 0.9 %
10 SYRINGE (ML) INJECTION
Status: DISCONTINUED | OUTPATIENT
Start: 2022-02-04 | End: 2022-02-04 | Stop reason: HOSPADM

## 2022-02-04 RX ADMIN — PACLITAXEL 300 MG: 100 INJECTION, POWDER, LYOPHILIZED, FOR SUSPENSION INTRAVENOUS at 12:02

## 2022-02-04 RX ADMIN — HEPARIN 500 UNITS: 100 SYRINGE at 01:02

## 2022-02-04 RX ADMIN — SODIUM CHLORIDE: 0.9 INJECTION, SOLUTION INTRAVENOUS at 11:02

## 2022-02-04 NOTE — PLAN OF CARE
Pt arrived Aaox3, VSS, labs reviewed, orders signed by Dr. Miller. Pt tolerated abraxane after fluids without issue. Pt discharged in stable, ambulatory  condition to home

## 2022-02-04 NOTE — TELEPHONE ENCOUNTER
"----- Message from Skyler Garcia sent at 2/4/2022 10:21 AM CST -----  Consult/Advisory:          Name Of Caller: Self      Contact Preference?: [phone]         Provider Name: [facility]      Does patient feel the need to be seen today? Yes      What is the nature of the call?: Calling to speak w/ nurse about today's appt.          Additional Notes:  "Thank you for all that you do for our patients'"      "

## 2022-02-04 NOTE — TELEPHONE ENCOUNTER
Spoke with patient who reports that he had some issues this morning but the charge nurse in infusion was able to correct it.

## 2022-02-07 ENCOUNTER — TELEPHONE (OUTPATIENT)
Dept: PALLIATIVE MEDICINE | Facility: CLINIC | Age: 47
End: 2022-02-07
Payer: MEDICARE

## 2022-02-07 ENCOUNTER — PATIENT MESSAGE (OUTPATIENT)
Dept: HEMATOLOGY/ONCOLOGY | Facility: CLINIC | Age: 47
End: 2022-02-07
Payer: MEDICARE

## 2022-02-08 ENCOUNTER — HOSPITAL ENCOUNTER (OUTPATIENT)
Dept: RADIOLOGY | Facility: HOSPITAL | Age: 47
Discharge: HOME | End: 2022-02-08
Attending: INTERNAL MEDICINE
Payer: MEDICARE

## 2022-02-08 DIAGNOSIS — C79.51 BONE METASTASES: ICD-10-CM

## 2022-02-08 DIAGNOSIS — C50.021 MALIGNANT NEOPLASM INVOLVING BOTH NIPPLE AND AREOLA OF RIGHT BREAST IN MALE, ESTROGEN RECEPTOR NEGATIVE: ICD-10-CM

## 2022-02-08 DIAGNOSIS — Z17.1 MALIGNANT NEOPLASM INVOLVING BOTH NIPPLE AND AREOLA OF RIGHT BREAST IN MALE, ESTROGEN RECEPTOR NEGATIVE: ICD-10-CM

## 2022-02-08 LAB — POCT GLUCOSE: 259 MG/DL (ref 70–110)

## 2022-02-09 DIAGNOSIS — C80.1 ANXIETY ASSOCIATED WITH CANCER DIAGNOSIS: ICD-10-CM

## 2022-02-09 DIAGNOSIS — F41.1 ANXIETY ASSOCIATED WITH CANCER DIAGNOSIS: ICD-10-CM

## 2022-02-10 ENCOUNTER — TELEPHONE (OUTPATIENT)
Dept: PALLIATIVE MEDICINE | Facility: CLINIC | Age: 47
End: 2022-02-10
Payer: MEDICARE

## 2022-02-10 ENCOUNTER — PATIENT MESSAGE (OUTPATIENT)
Dept: PALLIATIVE MEDICINE | Facility: CLINIC | Age: 47
End: 2022-02-10
Payer: MEDICARE

## 2022-02-10 DIAGNOSIS — Z17.1 MALIGNANT NEOPLASM INVOLVING BOTH NIPPLE AND AREOLA OF RIGHT BREAST IN MALE, ESTROGEN RECEPTOR NEGATIVE: ICD-10-CM

## 2022-02-10 DIAGNOSIS — R19.7 DIARRHEA, UNSPECIFIED TYPE: ICD-10-CM

## 2022-02-10 DIAGNOSIS — C50.021 MALIGNANT NEOPLASM INVOLVING BOTH NIPPLE AND AREOLA OF RIGHT BREAST IN MALE, ESTROGEN RECEPTOR NEGATIVE: ICD-10-CM

## 2022-02-10 RX ORDER — ALPRAZOLAM 0.5 MG/1
0.5 TABLET ORAL 3 TIMES DAILY PRN
Qty: 60 TABLET | Refills: 0 | Status: SHIPPED | OUTPATIENT
Start: 2022-02-10 | End: 2022-04-21 | Stop reason: SDUPTHER

## 2022-02-10 RX ORDER — DIPHENOXYLATE HYDROCHLORIDE AND ATROPINE SULFATE 2.5; .025 MG/1; MG/1
1 TABLET ORAL 4 TIMES DAILY PRN
Qty: 60 TABLET | Refills: 2 | Status: SHIPPED | OUTPATIENT
Start: 2022-02-10 | End: 2022-07-14 | Stop reason: SDUPTHER

## 2022-02-11 ENCOUNTER — OFFICE VISIT (OUTPATIENT)
Dept: PALLIATIVE MEDICINE | Facility: CLINIC | Age: 47
End: 2022-02-11
Payer: MEDICARE

## 2022-02-11 VITALS — HEART RATE: 65 BPM | SYSTOLIC BLOOD PRESSURE: 139 MMHG | DIASTOLIC BLOOD PRESSURE: 79 MMHG

## 2022-02-11 DIAGNOSIS — M79.2 NEUROPATHIC PAIN: ICD-10-CM

## 2022-02-11 DIAGNOSIS — Z17.1 MALIGNANT NEOPLASM INVOLVING BOTH NIPPLE AND AREOLA OF RIGHT BREAST IN MALE, ESTROGEN RECEPTOR NEGATIVE: Primary | ICD-10-CM

## 2022-02-11 DIAGNOSIS — G47.00 INSOMNIA, UNSPECIFIED TYPE: ICD-10-CM

## 2022-02-11 DIAGNOSIS — Z51.5 ENCOUNTER FOR PALLIATIVE CARE: ICD-10-CM

## 2022-02-11 DIAGNOSIS — R19.7 DIARRHEA, UNSPECIFIED TYPE: ICD-10-CM

## 2022-02-11 DIAGNOSIS — G89.3 CANCER RELATED PAIN: ICD-10-CM

## 2022-02-11 DIAGNOSIS — C79.51 BONE METASTASES: ICD-10-CM

## 2022-02-11 DIAGNOSIS — F43.23 ADJUSTMENT DISORDER WITH MIXED ANXIETY AND DEPRESSED MOOD: ICD-10-CM

## 2022-02-11 DIAGNOSIS — Z71.89 ADVANCED CARE PLANNING/COUNSELING DISCUSSION: ICD-10-CM

## 2022-02-11 DIAGNOSIS — C50.021 MALIGNANT NEOPLASM INVOLVING BOTH NIPPLE AND AREOLA OF RIGHT BREAST IN MALE, ESTROGEN RECEPTOR NEGATIVE: Primary | ICD-10-CM

## 2022-02-11 PROCEDURE — 3075F PR MOST RECENT SYSTOLIC BLOOD PRESS GE 130-139MM HG: ICD-10-PCS | Mod: CPTII,S$GLB,, | Performed by: STUDENT IN AN ORGANIZED HEALTH CARE EDUCATION/TRAINING PROGRAM

## 2022-02-11 PROCEDURE — 99214 PR OFFICE/OUTPT VISIT, EST, LEVL IV, 30-39 MIN: ICD-10-PCS | Mod: S$GLB,,, | Performed by: STUDENT IN AN ORGANIZED HEALTH CARE EDUCATION/TRAINING PROGRAM

## 2022-02-11 PROCEDURE — 99999 PR PBB SHADOW E&M-EST. PATIENT-LVL III: ICD-10-PCS | Mod: PBBFAC,,, | Performed by: STUDENT IN AN ORGANIZED HEALTH CARE EDUCATION/TRAINING PROGRAM

## 2022-02-11 PROCEDURE — 1159F MED LIST DOCD IN RCRD: CPT | Mod: CPTII,S$GLB,, | Performed by: STUDENT IN AN ORGANIZED HEALTH CARE EDUCATION/TRAINING PROGRAM

## 2022-02-11 PROCEDURE — 1160F PR REVIEW ALL MEDS BY PRESCRIBER/CLIN PHARMACIST DOCUMENTED: ICD-10-PCS | Mod: CPTII,S$GLB,, | Performed by: STUDENT IN AN ORGANIZED HEALTH CARE EDUCATION/TRAINING PROGRAM

## 2022-02-11 PROCEDURE — 99999 PR PBB SHADOW E&M-EST. PATIENT-LVL III: CPT | Mod: PBBFAC,,, | Performed by: STUDENT IN AN ORGANIZED HEALTH CARE EDUCATION/TRAINING PROGRAM

## 2022-02-11 PROCEDURE — 3078F PR MOST RECENT DIASTOLIC BLOOD PRESSURE < 80 MM HG: ICD-10-PCS | Mod: CPTII,S$GLB,, | Performed by: STUDENT IN AN ORGANIZED HEALTH CARE EDUCATION/TRAINING PROGRAM

## 2022-02-11 PROCEDURE — 3075F SYST BP GE 130 - 139MM HG: CPT | Mod: CPTII,S$GLB,, | Performed by: STUDENT IN AN ORGANIZED HEALTH CARE EDUCATION/TRAINING PROGRAM

## 2022-02-11 PROCEDURE — 1160F RVW MEDS BY RX/DR IN RCRD: CPT | Mod: CPTII,S$GLB,, | Performed by: STUDENT IN AN ORGANIZED HEALTH CARE EDUCATION/TRAINING PROGRAM

## 2022-02-11 PROCEDURE — 1159F PR MEDICATION LIST DOCUMENTED IN MEDICAL RECORD: ICD-10-PCS | Mod: CPTII,S$GLB,, | Performed by: STUDENT IN AN ORGANIZED HEALTH CARE EDUCATION/TRAINING PROGRAM

## 2022-02-11 PROCEDURE — 3078F DIAST BP <80 MM HG: CPT | Mod: CPTII,S$GLB,, | Performed by: STUDENT IN AN ORGANIZED HEALTH CARE EDUCATION/TRAINING PROGRAM

## 2022-02-11 PROCEDURE — 99214 OFFICE O/P EST MOD 30 MIN: CPT | Mod: S$GLB,,, | Performed by: STUDENT IN AN ORGANIZED HEALTH CARE EDUCATION/TRAINING PROGRAM

## 2022-02-11 NOTE — PROGRESS NOTES
"[facility]  Palliative Care   Social Work Visit      Patient Name: Paul Li Jr.  MRN: [id number]  Palliative Care Provider:   Primary Care Physician: Kareem Arroyo MD  Principal Problem: Malignant neoplasm involving both nipple and areola of right breast in male    SW accompanied MD during follow up visit with patient.  Services of Child Life introduced by CHANELL/Xiomara.  Patient reports things are going "awesome".  Patient notes he and spouse had a long discussion about their communication issues.  Patient reports following this discussion, their relationship has strengthened.  Patient reports he plans to bring spouse to a future appointment.  Patient reflected upon the recent lost of his friend to cancer and how this loss forces him to face his own mortality.  Empathetic listening and supportive counseling provided.  Patient denies psychosocial concerns at this time. SW remains available to provide emotional support and psychosocial assistance. SW will continue to follow.    Sade Duncan LCSW  Outpatient   Palliative Medicine           "

## 2022-02-11 NOTE — PROGRESS NOTES
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 Linn  - currently on disease directed therapy    Encounter for palliative care/Advanced care planning  - patient decisional  - patient by himself during visit today  - no ACP documents uploaded into EMR  - goals: life prolonging  - philosophy of Palliative Medicine reviewed with patient at first visit  - new patient folder given to and briefly reviewed with patient at first visit  - ACP booklet given to and reviewed with patient at first visit by Sarabjit Casas RN  - code status not specifically discussed at this visit  - will follow up at future appointments for any questions/concerns that arise after patient reviews new patient information   - did not discuss today due to increased emotional symptom burden    Adjustment disorder with mixed anxiety and depressed mood  - patient reports increased anxiety and depression at this time.   - he spoke openly at previous visit about multiple stressors and how the support he needs has changed over time; patient discussed his support system as well and how their interactions with him affect his overall emotional needs  - today patient states that after long discussion at last visit, he was able to go home and have an open but difficult conversation with his wife  - he reports that his overall relationship has improved since that time  - discussed additional ways to support patient and his needs within his support team today  - continue prozac with no changes in dosing at this time    Insomnia  - patient stated previously he obtains about 4-4.5 hours of sleep a night  - he denies any issues with insomnia today; He states that he is able to wear his cpap and go to  sleep without issue  - he feels much better with use of cpap. He states he sleeps well and has energy when he wakes up in the morning.   - etiology likely multifactorial including need for CPAP (recently approved by insurance) as well as no consistent sleep schedule for years  - tip sheet for better sleep in new patient folder for patient to review, including information about sleep schedule  - no need for pharmacologic intervention at this time  - will continue close monitoring    Diarrhea  - patient reports intermittent diarrhea  - it has improved recently with lomotil once a day  - refill sent in for patient prior to today's visit  - encouraged patient to stay hydrated for good bowel movements  - will continue to monitor    Cancer related pain/Neuropathic pain  - patient reports that he uses his Norco maybe once a day  - he states his main issue is neuropathy in his bilateral lower extremities with L > R  - he uses gabapentin 300 mg TID with moderate relief  - he has recently developed leg cramping at night; he increased fluid intake and has previously tried potassium supplementation. He did not discuss leg cramping this visit.   - previously recommended increasing gabapentin to 600 mg qhs and continue 300 mg at other times  - opioid safety sheet in new patient folder for patient to review  - will continue to monitor    Understanding of illness/Prognosis: patient has good understanding of his illness; prognosis is guarded    Goals of care: life prolonging    Follow up: ~ 12 weeks    Patient's encounter and above plan of care discussed with patient's oncologist    SUBJECTIVE:     History of Present Illness:  Patient is a 46 y.o. year old male with arthritis, DM, HTN, and right sided breast cancer presents to Palliative Medicine for symptom management and ACP. Please see oncology notes for full details of oncologic history and treatment course.     02/11/2022:  THEE GARCIA reviewed and summarized:  01/24/2022 Norco   mg Disp: 90 for 23 days    Today patient states he is doing much better. He discussed about how he and his wife had a long discussion, and he finds that they are in a much better place. He is open to suggestions regarding dating his wife and himself. Patient reporting his sleeping has improved too. He still has some diarrhea.     12/13/2021:  LA  reviewed and summarized:  11/11/2021 Norco  mg Disp: 90 for 23 days  11/10/2021 Alprazolam 0.5 mg Disp: 60 for 20 days  11/05/2021 Lomotil 2.5-0.035 mg Disp: 40 for 10 days    Today patient states his diarrhea has improved with use of lomotil once a day. Patient also had significant improvement in his sleep and energy with use of cpap. Patient has been having increased emotional distress due to multiple stressors. Please see SW note for further details.     10/11/2021:  LA  reviewed and summarized:  10/02/2021 Norco 5-325 mg Disp: 28 for 7 days    Patient presents to clinic by himself today. He reports his pain has significantly improved, and he is using Norco only once a week if that often. Patient is compliant with his gabapentin for neuropathic pain in his lower extremities. He does not sleep much overnight, only 4-4.5 hours. Patient has noticed improvement in his mood with prozac. He does find himself having increase in his emotions at times, both positive and negative. Patient spoke about how the initial diagnosis as well as recurrence has really affected him. He is open to learning about ACP.     Past Medical History:   Diagnosis Date    Arthritis     Breast cancer     Cancer     R breast    Diabetes mellitus     HTN (hypertension)     Leg edema, right     Prediabetes     Seizures     at age 18 - stress related    Therapy     marital counseling     Past Surgical History:   Procedure Laterality Date    AXILLARY NODE DISSECTION Right 11/22/2019    Procedure: LYMPHADENECTOMY, AXILLARY RIGHT;  Surgeon: Shima Whyte MD;  Location: [facility] OR;   Service: General;  Laterality: Right;    AXILLARY NODE DISSECTION Right 12/17/2019    Procedure: LYMPHADENECTOMY, AXILLARY;  Surgeon: Shima Whyte MD;  [facility];  Service: General;  Laterality: Right;    BREAST BIOPSY      INSERTION OF TUNNELED CENTRAL VENOUS CATHETER (CVC) WITH SUBCUTANEOUS PORT Left 5/24/2019    Procedure: [id number];  Surgeon: Shima Whyte MD;  [facility];  Service: General;  Laterality: Left;    INSERTION OF TUNNELED CENTRAL VENOUS CATHETER (CVC) WITH SUBCUTANEOUS PORT Left 9/17/2021    Procedure: INSERTION, SINGLE LUMEN CATHETER WITH PORT, WITH FLUOROSCOPIC GUIDANCE, right;  Surgeon: Gloria Holliday MD;  [facility];  Service: General;  Laterality: Left;  rapid covid test    SENTINEL LYMPH NODE BIOPSY Right 11/22/2019    Procedure: BIOPSY, LYMPH NODE, SENTINEL RIGHT;  Surgeon: Shima Whyte MD;  Location: [facility];  Service: General;  Laterality: Right;    SIMPLE MASTECTOMY Right 11/22/2019    Procedure: MASTECTOMY, SIMPLE RIGHT (CONSENT AM OF) 2.5 hr case;  Surgeon: Shima Whyte MD;  Location: [facility];  Service: General;  Laterality: Right;     Family History   Problem Relation Age of Onset    Hypertension Mother     Diabetes Mother     Hypertension Father     Lupus Father     Post-traumatic stress disorder Brother     No Known Problems Maternal Grandmother     Colon cancer Maternal Grandfather     Breast cancer Paternal Grandmother     No Known Problems Paternal Grandfather     No Known Problems Sister     No Known Problems Maternal Aunt     No Known Problems Maternal Uncle     Fibromyalgia Paternal Aunt     Lupus Paternal Aunt     No Known Problems Paternal Uncle     No Known Problems Brother     No Known Problems Sister     No Known Problems Son     No Known Problems Son     No Known Problems Son     No Known Problems Son      Review of patient's allergies indicates:  No Known  Allergies    Medications:    Current Outpatient Medications:     alpelisib 300 mg/day (150 mg x 2) Tab, Take 300 mg by mouth once daily., Disp: 60 tablet, Rfl: 3    ALPRAZolam (XANAX) 0.5 MG tablet, Take 1 tablet (0.5 mg total) by mouth 3 (three) times daily as needed for Insomnia or Anxiety., Disp: 60 tablet, Rfl: 0    amLODIPine (NORVASC) 10 MG tablet, TAKE 1 TABLET (10 MG) BY MOUTH EVERY DAY, Disp: 90 tablet, Rfl: 3    calcium-vitamin D3 (OYSTER SHELL CALCIUM-VIT D3) 500 mg-5 mcg (200 unit) per tablet, Take 1 tablet by mouth 2 (two) times daily., Disp: 180 tablet, Rfl: 3    diphenoxylate-atropine 2.5-0.025 mg (LOMOTIL) 2.5-0.025 mg per tablet, Take 1 tablet by mouth 4 (four) times daily as needed for Diarrhea., Disp: 60 tablet, Rfl: 2    dulaglutide (TRULICITY) 1.5 mg/0.5 mL PnIj, Inject 1.5 mg into the skin once a week. Trulicity 0.75mg weekly for 4 weeks & then increase to 1.5 mg weekly indefinitely. (Patient taking differently: Inject 1.5 mg into the skin once a week. Trulicity 0.75mg weekly for 4 weeks & then increase to 1.5 mg weekly indefinitely.(PATIENT TAKES ON SUNDAYS)), Disp: 6 mL, Rfl: 3    FLUoxetine 20 MG capsule, Take 1 capsule (20 mg total) by mouth once daily., Disp: 90 capsule, Rfl: 1    gabapentin (NEURONTIN) 300 MG capsule, Take 1 capsule (300 mg total) by mouth 3 (three) times daily., Disp: 90 capsule, Rfl: 11    hydroCHLOROthiazide (HYDRODIURIL) 25 MG tablet, TAKE 1 TABLET BY MOUTH ONCE DAILY, Disp: 90 tablet, Rfl: 3    HYDROcodone-acetaminophen (NORCO)  mg per tablet, Take 1 tablet by mouth every 6 (six) hours as needed for Pain., Disp: 90 tablet, Rfl: 0    insulin detemir U-100 (LEVEMIR FLEXTOUCH U-100 INSULN) 100 unit/mL (3 mL) InPn pen, Inject 15 Units into the skin every evening., Disp: 6 mL, Rfl: 2    insulin lispro 100 unit/mL pen, Inject 5 Units into the skin 3 (three) times daily with meals., Disp: 6 mL, Rfl: 2    lancets 33 gauge Misc, 1 lancet by Misc.(Non-Drug;  "Combo Route) route once daily., Disp: 100 each, Rfl: 3    lancing device Misc, 1 Device by Misc.(Non-Drug; Combo Route) route 2 (two) times daily with meals., Disp: 1 each, Rfl: 0    LIDOcaine-prilocaine (EMLA) cream, Apply topically as needed., Disp: 30 g, Rfl: 0    losartan (COZAAR) 50 MG tablet, Take 2 tablets by mouth once daily, Disp: 180 tablet, Rfl: 3    metFORMIN (GLUCOPHAGE) 500 MG tablet, Take 2 tablets (1,000 mg total) by mouth 2 (two) times daily with meals., Disp: 90 tablet, Rfl: 3    ondansetron (ZOFRAN-ODT) 8 MG TbDL, Take 1 tablet (8 mg total) by mouth every 8 (eight) hours as needed (nausea)., Disp: 30 tablet, Rfl: 2    pen needle, diabetic (BD ULTRA-FINE TANESHA PEN NEEDLE) 32 gauge x 5/32" Ndle, Use as directed with novolog and levemir, Disp: 100 each, Rfl: 5    tadalafiL (CIALIS) 5 MG tablet, Take 2 tablets (10 mg total) by mouth daily as needed for Erectile Dysfunction., Disp: 20 tablet, Rfl: 1  No current facility-administered medications for this visit.    Facility-Administered Medications Ordered in Other Visits:     0.9%  NaCl infusion, , Intravenous, Continuous, Rosa Linn MD, Stopped at 11/19/21 1634    alteplase injection 2 mg, 2 mg, Intra-Catheter, PRN, Rosa Linn MD    heparin, porcine (PF) 100 unit/mL injection flush 500 Units, 500 Units, Intravenous, 1 time in Clinic/HOD, Richa Bahena MD    heparin, porcine (PF) 100 unit/mL injection flush 500 Units, 500 Units, Intravenous, PRN, Rosa Linn MD    heparin, porcine (PF) 100 unit/mL injection flush 500 Units, 500 Units, Intravenous, PRN, Rosa Linn MD, 500 Units at 11/19/21 1635    sodium chloride 0.9% 250 mL flush bag, , Intravenous, 1 time in Clinic/HOD, Rosa Linn MD    sodium chloride 0.9% flush 10 mL, 10 mL, Intravenous, 1 time in Clinic/HOD, Richa Bahena MD    sodium chloride 0.9% flush 10 mL, 10 mL, Intravenous, PRN, Rosa Linn MD, 10 mL at 11/19/21 1501    sodium chloride 0.9% flush 10 " mL, 10 mL, Intravenous, PRN, Rosa Linn MD, 10 mL at 11/19/21 1635    OBJECTIVE:       ROS:  Review of Systems   Constitutional: Negative.    HENT: Negative.    Eyes: Negative.    Respiratory: Negative.    Cardiovascular: Negative.    Gastrointestinal: Positive for diarrhea (improved). Negative for abdominal pain and nausea.   Genitourinary: Negative.    Musculoskeletal: Positive for myalgias.   Skin: Negative.    Neurological:        + neuropathic pain in bilateral lower extremities   Psychiatric/Behavioral: Negative.  Negative for dysphoric mood and sleep disturbance. The patient is not nervous/anxious.    All other systems reviewed and are negative.      Review of Symptoms    Symptom Assessment (ESAS 0-10 Scale)  Pain:  0  Dyspnea:  0  Anxiety:  0  Nausea:  0  Depression:  0  Anorexia:  0  Fatigue:  0  Insomnia:  0  Restlessness:  0  Agitation:  0     CAM / Delirium:  Negative  Constipation:  Negative  Diarrhea:  Negative    Bowel Management Plan (BMP):  Yes      Pain Assessment:  OME in 24 hours:  5-10  Location(s): leg    Leg       Location: bilateral        Quality: tingling        Quantity: 0/10 in intensity month(s) ago, unchanged        Aggravating Factors: none        Alleviating Factors: opiates        Associated Symptoms: none    Modified Mikal Scale:  0    ECOG Performance Status ndGndrndanddndend:nd nd2nd Living Arrangements:  Lives with spouse    Psychosocial/Cultural: Patient lives with his wife. He has three sons (one set of twins)    Spiritual:  F - Mariella and Belief:  Yes  I - Importance:  High  A - Address in Care:  Yes      Advance Care Planning   Advance Directives:   Living Will: No    LaPOST: No    Do Not Resuscitate Status: No    Medical Power of : No    Agent's Name:  Efraín Li   Agent's Contact Number:  [phone]    Decision Making:  Patient answered questions          Physical Exam:  Vitals:     Vitals:    02/11/22 1054   BP: 139/79   Pulse: 65       Physical Exam  Vitals reviewed.  "  Constitutional:       General: He is not in acute distress.     Appearance: He is not ill-appearing.   HENT:      Head: Normocephalic and atraumatic.      Right Ear: External ear normal.      Left Ear: External ear normal.   Eyes:      General: No scleral icterus.        Right eye: No discharge.         Left eye: No discharge.   Pulmonary:      Effort: Pulmonary effort is normal. No respiratory distress.   Abdominal:      General: Abdomen is flat. There is no distension.   Musculoskeletal:         General: No deformity or signs of injury.      Cervical back: Normal range of motion.   Skin:     Coloration: Skin is not pale.      Findings: No lesion or rash.   Neurological:      Mental Status: He is alert and oriented to person, place, and time.      Gait: Gait normal.   Psychiatric:         Attention and Perception: Attention normal.         Mood and Affect: Mood normal.         Speech: Speech normal.         Cognition and Memory: Cognition normal.         Judgment: Judgment normal.         Labs:  CBC:   WBC   Date Value Ref Range Status   02/04/2022 4.44 3.90 - 12.70 K/uL Final     Hemoglobin   Date Value Ref Range Status   02/04/2022 11.7 (L) 14.0 - 18.0 g/dL Final     Hematocrit   Date Value Ref Range Status   02/04/2022 36.6 (L) 40.0 - 54.0 % Final     MCV   Date Value Ref Range Status   02/04/2022 83 82 - 98 fL Final     Platelets   Date Value Ref Range Status   02/04/2022 255 150 - 450 K/uL Final       LFT:   Lab Results   Component Value Date    AST 25 02/04/2022    ALKPHOS 86 02/04/2022    BILITOT 0.8 02/04/2022       Albumin:   Albumin   Date Value Ref Range Status   02/04/2022 4.1 3.5 - 5.2 g/dL Final     Protein:   Total Protein   Date Value Ref Range Status   02/04/2022 7.8 6.0 - 8.4 g/dL Final       Radiology:I have reviewed all pertinent imaging results/findings within the past 24 hours.    10/05/2021 NM PET CT: "Hypermetabolic osseous metastases consistent with partial response to treatment and " "persistent viable disease.  No new hypermetabolic osseous lesions. Stable subcentimeter pulmonary nodules too small for metabolic assessment with PET.  Attention on follow-up."      Encounter occurred during period of COVID-19 emergency. Encounter performed under the concurrent guidelines, limitations and protocols.    Signature: Angeles Rodriguez MD          "

## 2022-02-14 ENCOUNTER — PATIENT MESSAGE (OUTPATIENT)
Dept: OTHER | Facility: OTHER | Age: 47
End: 2022-02-14
Payer: MEDICARE

## 2022-02-15 ENCOUNTER — HOSPITAL ENCOUNTER (OUTPATIENT)
Dept: RADIOLOGY | Facility: HOSPITAL | Age: 47
Discharge: HOME | End: 2022-02-15
Attending: INTERNAL MEDICINE
Payer: MEDICARE

## 2022-02-15 LAB — POCT GLUCOSE: 95 MG/DL (ref 70–110)

## 2022-02-15 PROCEDURE — 25500020 PHARM REV CODE 255: Performed by: INTERNAL MEDICINE

## 2022-02-15 PROCEDURE — 78815 NM PET CT ROUTINE: ICD-10-PCS | Mod: 26,PS,, | Performed by: STUDENT IN AN ORGANIZED HEALTH CARE EDUCATION/TRAINING PROGRAM

## 2022-02-15 PROCEDURE — A9698 NON-RAD CONTRAST MATERIALNOC: HCPCS | Performed by: INTERNAL MEDICINE

## 2022-02-15 PROCEDURE — 78815 PET IMAGE W/CT SKULL-THIGH: CPT | Mod: TC

## 2022-02-15 PROCEDURE — 78815 PET IMAGE W/CT SKULL-THIGH: CPT | Mod: 26,PS,, | Performed by: STUDENT IN AN ORGANIZED HEALTH CARE EDUCATION/TRAINING PROGRAM

## 2022-02-15 RX ADMIN — IOHEXOL 1000 ML: 9 SOLUTION ORAL at 03:02

## 2022-02-16 ENCOUNTER — PATIENT MESSAGE (OUTPATIENT)
Dept: RESEARCH | Facility: HOSPITAL | Age: 47
End: 2022-02-16
Payer: MEDICARE

## 2022-02-16 ENCOUNTER — TELEPHONE (OUTPATIENT)
Dept: HEMATOLOGY/ONCOLOGY | Facility: CLINIC | Age: 47
End: 2022-02-16
Payer: MEDICARE

## 2022-02-16 NOTE — TELEPHONE ENCOUNTER
"----- Message from Jennifer Guido sent at 2/16/2022  9:41 AM CST -----  Regarding: Results  Name Of Caller: Paul    Contact Preference?: [phone]    What is the nature of the call?: inquiring PET scan results also need to get refill on glucose supplies           Additional Notes:  "Thank you for all that you do for our patients'"     "

## 2022-02-16 NOTE — TELEPHONE ENCOUNTER
Spoke with patient who reports that MD has already called him to discuss results.  Scheduled EP visit and labs and informed patient that schedulers are working to get his chemo appointment scheduled for Friday 2/18.

## 2022-02-16 NOTE — PROGRESS NOTES
Subjective:       Patient ID: Paul Li Jr. is a 46 y.o. male.    Chief Complaint: Metastatic breast cancer, negative genetic testing     Returns for follow up and C7D1 Abraxane.   Appetite is good. Energy is good.   Reports personal stressors  Left foot with numbness and tingling.   Diarrhea - takes imodium as needed  Denies pain  No n/v/d  Denies fevers, chills or signs of infection     Diagnosis:  Metastatic TNBC progressed (prior Stage IB ([id number]) triple negative invasive ductal carcinoma with lobular features of right breast, retroareolar, Grade 2, Ki67 80%, diagnosed 2019)     Per Dr. Linn's previous note: Oncology History:  Metastatic  Set up for scans (due to back pain) which are consistent for recurrence.   Imagin/3/2021 MRI T/L spine:  FINDINGS:  Alignment: Within normal limits.  Vertebrae: Low T1 signal intensity in the left T12 vertebral body extending to the left pedicle, S1 and S2 vertebral bodies with abnormal enhancement.  The vertebral heights are well maintained.  No evidence of acute fractures.  Abnormal appearance of the LEFT sacrum and ilium as well.  Discs: Degenerative disease of the level of L4-L5 with posterior annular fissure.  Conus appears within normal limits terminating at the level of the L2. level.  Cauda equina appears unremarkable.  Mild circumferential disc bulging at the level of T10-T11 abutting the ventral thecal sac.  No significant spinal canal stenosis or neural foraminal narrowing throughout the thoracic spine.  No significant spinal canal stenosis or neural foraminal narrowing throughout the lumbar spine.  Paraspinous muscles and soft tissues: Subcentimeter focal enhancement in the posterior column along the supraspinous ligament at the level of L1-L2 (sagittal series 21, image 10) potentially inflammatory versus neoplastic.  Impression:  Abnormal appearance of the T12, S1, S2 vertebral bodies as well as LEFT sacrum and ilium concerning for metastatic disease  in this patient with history of breast cancer.  No evidence for significant central canal narrowing.  Additional findings, as above.  This report was flagged in Epic as abnormal.     6/9/2021 PET scan:  COMPARISON:  MRI thoracic and lumbar spine 06/03/2021  FINDINGS:  Quality of the study: Adequate.  In the head and neck, there are no hypermetabolic lesions worrisome for malignancy. There are no hypermetabolic mucosal lesions, and there are no pathologically enlarged or hypermetabolic lymph nodes.  In the chest, there is postoperative change of right mastectomy/right axillary lymph node dissection.  There is low level hypermetabolic activity with mild soft tissue thickening in the skin/superficial subcutaneous fat of the right chest wall (axial fused image 78) with SUV max 3.6.  There is soft tissue thickening measuring approximately 1.8 x 7.9 cm in the subcutaneous fat of the right chest wall (axial series 3, image 84) with SUV max 3.1.  There are a few bilateral pulmonary nodules measuring up to 0.3 cm in the left lung (axial series 3, image 88) and 0.5 cm in the right lung (axial series 3, image 84).  These nodules are too small to characterize by PET.  There are no pathologically enlarged or hypermetabolic lymph nodes.  In the abdomen and pelvis, there is physiologic tracer distribution within the abdominal organs and excretion into the genitourinary system.  Subtle sen mesentery and multiple prominent mesenteric lymph nodes measuring up to 1.8 cm in long axis with background activity.  In the bones, there are several hypermetabolic lesions worrisome for malignancy.  Sclerotic lesion in the left T12 vertebral body (axial series 3, image 131) with SUV max 12.  Sclerotic lesions in the sacrum (for example axial series 3, image 188) with SUV max 9.7.  Sclerotic lesions in the left iliac bone (for example axial series 3, image 205) with SUV max 6.  In the extremities, there are no hypermetabolic lesions worrisome  for malignancy.  Incidental findings:  Bilateral maxillary antra mucous retention cysts.  Fat containing right inguinal hernia.  Impression:  1. In this patient with right breast carcinoma status post mastectomy/right axillary lymph node dissection, there are multiple sclerotic lesions in the T12 vertebral body, sacrum, and left iliac bone with hypermetabolic activity concerning for active metastatic disease and in keeping with findings on MRI 06/03/2021.  2. Additionally there is low-level hypermetabolic activity with soft tissue thickening in the right chest wall as detailed above.  These findings are nonspecific and may be related to postoperative/post radiation change, with recurrent malignancy not excluded.  3. Nonspecific mesenteric haziness and lymph nodes which may indicate mesenteric adenitis.  Recommend clinical correlation and attention on follow-up.  4. Additional findings as above.  I, Sohan Tillman MD, attest that I reviewed and interpreted the images.     Most recent imaging:  10/5/2021 PET:   FINDINGS:  Quality of the study: Adequate.  In the head and neck, there are no hypermetabolic lesions worrisome for malignancy. There are no hypermetabolic mucosal lesions, and there are no pathologically enlarged or hypermetabolic lymph nodes.  In the chest, no postsurgical changes of right mastectomy/right axillary lymph node dissection.  There are no hypermetabolic lesions worrisome for malignancy.  There are no concerning pulmonary nodules or masses, and there are no pathologically enlarged or hypermetabolic lymph nodes.  Stable subcentimeter bilateral pulmonary micro nodules too small for metabolic assessment with PET.  For reference, right lung axial series 2, image 83.  In the abdomen and pelvis, there is physiologic tracer distribution within the abdominal organs and excretion into the genitourinary system.  In the bones, there are multiple hypermetabolic lesions consistent with viable metastases.  Index  lesions as follows:  1. Sclerotic lesion T12 vertebral body (axial series 2, image 125).  SUV max of 8.3 (previously 12).  2. Diffuse ill-defined sclerosis of the sacrum(axial fused images 195 and 200).  SUV max of 6.6 (previously 9.7).  3. Sclerotic lesion left iliac bone (axial series 2, image 201).  SUV max of 5.8 (previously 6).  Impression:  Hypermetabolic osseous metastases consistent with partial response to treatment and persistent viable disease.  No new hypermetabolic osseous lesions.  Stable subcentimeter pulmonary nodules too small for metabolic assessment with PET.  Attention on follow-up.     In summary,   Started aplelisib 8/22/2021   Abraxane C1 started 8/13/2021  We had sent Guardant and discussed results with Molecular tumor board  He also had a repeat bx to confirm metastatic triple negative breast cancer  Plan:   Nab-Paclitaxel and Alpelisib  Reference: https://ascopubs.org/doi/abs/10.1200/JCO.[email or web address]_suppl.1018  Also on Zometa-      - C1D1 Abraxane 8/13/2021  - Started aplelisib 8/22/2021               Oncology History   Malignant neoplasm involving both nipple and areola of right breast in male, estrogen receptor negative   5/1/2019 Imaging Significant Findings     Mammogram and US  Impression:  Right  Mass: Right breast 14 mm mass at the retroareolar anterior position. Assessment: 4 - Suspicious finding. Biopsy is recommended.   Left  There is no mammographic or sonographic evidence of malignancy.  Recommendation:  Biopsy is recommended.      5/2/2019 Biopsy     BREAST, RIGHT, RETROAREOLAR MASS, ULTRASOUND-GUIDED BIOPSY:  Invasive ductal carcinoma with lobular features.  Size of invasive carcinoma: 5 MM in greatest linear dimension within a single      5/2/2019 Breast Tumor Markers     Estrogen: Negative  Progesterone: Negative  HER2: Negative  Ki67: 80      5/17/2019 Cancer Staged     Staging form: Breast, AJCC 8th Edition  - Clinical stage from 5/17/2019: Stage IB (cT1c, cN0, cM0, G2,  ER-, ND-, HER2-) - Signed by Richa Bahena MD on 5/17/2019 5/27/2019 - 7/21/2019 Chemotherapy     Treatment Summary   Plan Name: OP BREAST DOSE-DENSE AC - DOXORUBICIN CYCLOPHOSPHAMIDE Q2W  Treatment Goal: Curative  Status: Inactive  Start Date: 5/27/2019  End Date: 7/9/2019  Provider: Richa Bahena MD  Chemotherapy: DOXOrubicin chemo injection 186 mg, 60 mg/m2 = 186 mg, Intravenous, Clinic/HOD 1 time, 4 of 4 cycles  Administration: 186 mg (5/27/2019), 186 mg (6/10/2019), 186 mg (6/24/2019), 186 mg (7/8/2019)  cyclophosphamide (CYTOXAN) 600 mg/m2 = 1,865 mg in sodium chloride 0.9% 250 mL chemo infusion, 600 mg/m2 = 1,865 mg, Intravenous, Clinic/HOD 1 time, 4 of 4 cycles  Administration: 1,865 mg (5/27/2019), 1,865 mg (6/10/2019), 1,865 mg (6/24/2019), 1,865 mg (7/8/2019)      7/22/2019 - 10/15/2019 Chemotherapy     Treatment Summary   Plan Name: OP PACLITAXEL QW  Treatment Goal: Curative  Status: Inactive  Start Date: 7/24/2019  End Date: 10/8/2019  Provider: Richa Bahena MD  Chemotherapy: PACLitaxel (TAXOL) 80 mg/m2 = 246 mg in sodium chloride 0.9% 250 mL chemo infusion, 80 mg/m2 = 246 mg, Intravenous, Clinic/HOD 1 time, 12 of 12 cycles  Dose modification: 60 mg/m2 (original dose 80 mg/m2, Cycle 3), 55 mg/m2 (original dose 80 mg/m2, Cycle 7), 60 mg/m2 (original dose 80 mg/m2, Cycle 7), 50 mg/m2 (original dose 80 mg/m2, Cycle 9), 50 mg/m2 (original dose 80 mg/m2, Cycle 10)  Administration: 246 mg (7/24/2019), 246 mg (7/31/2019), 186 mg (8/7/2019), 186 mg (8/14/2019), 186 mg (8/21/2019), 186 mg (8/28/2019), 186 mg (9/4/2019), 174 mg (9/11/2019), 156 mg (9/18/2019), 156 mg (9/25/2019), 156 mg (10/1/2019), 156 mg (10/8/2019)      11/22/2019 Breast Surgery     On 11/22/19 Dr whyte performed a right breast mastectomy with SLN biopsy with pathology showing grade 2, 6 mm IDC with extensive treatment effect, no LVI with 1 LN positive 4 mm, negative margins. The on 12/17/19, Dr Whyte performed right  axillary dissection with pathology still pending.      11/22/2019 Cancer Staged     ypT1b N1      12/17/2019 Breast Surgery     Surgery: Dr Shima Whyte, [phone] performed right ALND with pathology showing 14 negative lymph nodes.             1/14/2020 Tumor Conference      Post mastectomy radiation therapy: Xeloda, then possible Keytruda trial .      2/3/2020 - 3/23/2020 Radiation Therapy     Treating physician: Speedy Nair MD   Total Dose: 50.4 Gy to the chest wall and regional lymphatics  10 Gy boost to the prostate.   Fractions: 28 fractions at 1.8 Gy per fraction  5 fractions at 2 Gy per fraction       2/28/2020 -  Chemotherapy     * 4/15/2020 - 9/28/20 completed 6 cycles adjuvant Xeloda 1000 mg/m2 bid days 1-14 every 21 days  Treatment Summary   Plan Name: OP CAPECITABINE 1250MG/M2 BID Q3W  Treatment Goal: Curative  Status: Active  Start Date: 3/20/2020  End Date: 3/20/2020  Provider: Richa Bahena MD  Chemotherapy: [No matching medication found in this treatment plan]               Review of Systems   Constitutional: Positive for fatigue. Negative for activity change, appetite change, chills, fever and unexpected weight change.   HENT: Negative for dental problem, postnasal drip, rhinorrhea, sinus pressure/congestion, sore throat, tinnitus, trouble swallowing and voice change.    Eyes: Negative for visual disturbance.   Respiratory: Negative for cough, shortness of breath and wheezing.    Cardiovascular: Negative for chest pain, palpitations and leg swelling.   Gastrointestinal: Positive for diarrhea (controlled). Negative for abdominal distention, abdominal pain, blood in stool, constipation, nausea and vomiting.   Endocrine: Negative for cold intolerance and heat intolerance.   Genitourinary: Negative for decreased urine volume, difficulty urinating, dysuria, frequency, hematuria and urgency.   Musculoskeletal: Negative for arthralgias, back pain, gait problem, joint swelling, myalgias, neck  pain and neck stiffness.   Integumentary:  Negative for color change, pallor, rash and wound.   Neurological: Negative for dizziness, weakness, light-headedness, numbness and headaches.   Hematological: Negative for adenopathy. Does not bruise/bleed easily.   Psychiatric/Behavioral: Negative for dysphoric mood and sleep disturbance. The patient is nervous/anxious (multiple stressors).          Objective:      Physical Exam  Vitals and nursing note reviewed.   Constitutional:       General: He is not in acute distress.     Appearance: Normal appearance. He is well-developed. He is obese. He is not diaphoretic.      Comments: Very pleasant  Presents alone  ECOG= 0   HENT:      Head: Normocephalic and atraumatic.   Eyes:      General: No scleral icterus.     Extraocular Movements: Extraocular movements intact.      Conjunctiva/sclera: Conjunctivae normal.      Pupils: Pupils are equal, round, and reactive to light.   Neck:      Thyroid: No thyromegaly.      Trachea: No tracheal deviation.   Cardiovascular:      Rate and Rhythm: Normal rate and regular rhythm.      Heart sounds: Normal heart sounds. No murmur heard.  No friction rub. No gallop.    Pulmonary:      Effort: Pulmonary effort is normal. No respiratory distress.      Breath sounds: Normal breath sounds. No wheezing, rhonchi or rales.   Chest:      Chest wall: No tenderness.   Abdominal:      General: Bowel sounds are normal. There is no distension.      Palpations: Abdomen is soft. There is no mass.      Tenderness: There is no abdominal tenderness. There is no guarding or rebound.      Comments: No organomegaly   Musculoskeletal:         General: No swelling, tenderness or deformity. Normal range of motion.      Cervical back: Normal range of motion and neck supple. No rigidity or tenderness.      Right lower leg: No edema.      Left lower leg: No edema.   Lymphadenopathy:      Cervical: No cervical adenopathy.   Skin:     General: Skin is warm and dry.       Coloration: Skin is not jaundiced or pale.      Findings: No erythema or rash.   Neurological:      General: No focal deficit present.      Mental Status: He is alert and oriented to person, place, and time.      Cranial Nerves: No cranial nerve deficit.      Sensory: No sensory deficit.      Motor: No weakness or abnormal muscle tone.      Coordination: Coordination normal.      Gait: Gait normal.      Deep Tendon Reflexes: Reflexes are normal and symmetric. Reflexes normal.   Psychiatric:         Mood and Affect: Mood normal.         Behavior: Behavior normal.         Thought Content: Thought content normal.         Judgment: Judgment normal.       Labs- reviewed  Assessment:           1. Malignant neoplasm involving both nipple and areola of right breast in male, estrogen receptor negative     2. Bone metastases     3. Chemotherapy-induced neutropenia     4. Essential hypertension     5. Type 2 diabetes mellitus without complication, unspecified whether long term insulin use     6. Uncontrolled type 2 diabetes mellitus with hyperglycemia     7. Morbid obesity with BMI of 50.0-59.9, adult     8. Adjustment disorder with mixed anxiety and depressed mood  FLUoxetine 20 MG capsule      Plan:     1-2. Scans from this week with a favorable response  Proceed with Cycle 7 Day 1 abraxane/Piqray; Zometa due next week   ANC 1300 - Neupogen Monday and Tuesday     3. Stable in left foot    4. Monitored today; continue current medication and follow up with PCP     5-6. Managed better with increase in activity    7. Exercising more regularly     8. Increased Fluoxetine due to depression and anxiety symptoms, has xanax PRN if needed     Needs pyschology referral    Return to clinic in 1 week with MD appointment and labs.     Patient is in agreement with the proposed treatment plan. All questions were answered to the patient's satisfaction. Patient knows to call clinic for any new or worsening symptoms and if anything is needed  before the next clinic visit.          Michelle Grayson, FNP-C  Hematology & Medical Oncology   [address] [place] [address]  ph. [phone]  Fax. [phone]    Patient discussed with collaborating physician, Dr. Linn.    Approximately 21 minutes were spent face-to-face with the patient.  Approximately 30 minutes in total were spent on this encounter, which includes face-to-face time and non-face-to-face time preparing to see the patient (e.g., review of tests), obtaining and/or reviewing separately obtained history, documenting clinical information in the electronic or other health record, independently interpreting results (not separately reported) and communicating results to the patient/family/caregiver, or care coordination (not separately reported).     Route Chart for Scheduling    Med Onc Chart Routing      Follow up with physician . Appointment in 1 and 2 weeks correct   Follow up with [facility] 4 weeks.   Labs CBC and CMP   Lab interval:     Imaging    Pharmacy appointment    Other referrals          Treatment Plan Information   OP BREAST NAB-PACLITAXEL D1, D8, D15 + ALPELISIB    Rosa Linn MD   Upcoming Treatment Dates - OP BREAST NAB-PACLITAXEL D1, D8, D15 + ALPELISIB     2/25/2022       Chemotherapy       PACLitaxel-protein bound (ABRAXANE) 100 mg/m2 = 300 mg in 60 mL infusion  3/4/2022       Chemotherapy       PACLitaxel-protein bound (ABRAXANE) 100 mg/m2 = 300 mg in 60 mL infusion  3/11/2022       Chemotherapy       PACLitaxel-protein bound (ABRAXANE) 100 mg/m2 = 300 mg in 60 mL infusion  3/25/2022       Chemotherapy       PACLitaxel-protein bound (ABRAXANE) 100 mg/m2 = 300 mg in 60 mL infusion    Therapy Plan Information  PORT FLUSH  Flushes  heparin, porcine (PF) 100 unit/mL injection flush 500 Units  500 Units, Intravenous, Every visit  sodium chloride 0.9% flush 10 mL  10 mL, Intravenous, Every visit    ZOLEDRONIC ACID (ZOMETA) IV  Medications  zoledronic 4 mg/100 mL infusion  4 mg  4 mg, Intravenous, Every 4 weeks  Flushes  sodium chloride 0.9% 250 mL flush bag  Intravenous, Every 4 weeks  sodium chloride 0.9% flush 10 mL  10 mL, Intravenous, Every 4 weeks  heparin, porcine (PF) 100 unit/mL injection flush 500 Units  500 Units, Intravenous, Every 4 weeks  alteplase injection 2 mg  2 mg, Intra-Catheter, Every 4 weeks

## 2022-02-17 ENCOUNTER — TELEPHONE (OUTPATIENT)
Dept: HEMATOLOGY/ONCOLOGY | Facility: CLINIC | Age: 47
End: 2022-02-17
Payer: MEDICARE

## 2022-02-18 ENCOUNTER — OFFICE VISIT (OUTPATIENT)
Dept: HEMATOLOGY/ONCOLOGY | Facility: CLINIC | Age: 47
End: 2022-02-18
Payer: MEDICARE

## 2022-02-18 ENCOUNTER — INFUSION (OUTPATIENT)
Dept: INFUSION THERAPY | Facility: OTHER | Age: 47
End: 2022-02-18
Payer: MEDICARE

## 2022-02-18 VITALS
TEMPERATURE: 99 F | OXYGEN SATURATION: 96 % | BODY MASS INDEX: 40.43 KG/M2 | DIASTOLIC BLOOD PRESSURE: 83 MMHG | WEIGHT: 315 LBS | RESPIRATION RATE: 16 BRPM | SYSTOLIC BLOOD PRESSURE: 135 MMHG | HEART RATE: 63 BPM | HEIGHT: 74 IN

## 2022-02-18 DIAGNOSIS — T45.1X5A CHEMOTHERAPY-INDUCED NEUTROPENIA: ICD-10-CM

## 2022-02-18 DIAGNOSIS — C79.51 BONE METASTASES: ICD-10-CM

## 2022-02-18 DIAGNOSIS — I10 ESSENTIAL HYPERTENSION: ICD-10-CM

## 2022-02-18 DIAGNOSIS — Z17.1 MALIGNANT NEOPLASM INVOLVING BOTH NIPPLE AND AREOLA OF RIGHT BREAST IN MALE, ESTROGEN RECEPTOR NEGATIVE: Primary | ICD-10-CM

## 2022-02-18 DIAGNOSIS — D70.1 CHEMOTHERAPY-INDUCED NEUTROPENIA: ICD-10-CM

## 2022-02-18 DIAGNOSIS — E11.65 UNCONTROLLED TYPE 2 DIABETES MELLITUS WITH HYPERGLYCEMIA: ICD-10-CM

## 2022-02-18 DIAGNOSIS — E66.01 MORBID OBESITY WITH BMI OF 50.0-59.9, ADULT: ICD-10-CM

## 2022-02-18 DIAGNOSIS — C50.021 MALIGNANT NEOPLASM INVOLVING BOTH NIPPLE AND AREOLA OF RIGHT BREAST IN MALE, ESTROGEN RECEPTOR NEGATIVE: Primary | ICD-10-CM

## 2022-02-18 DIAGNOSIS — E11.9 TYPE 2 DIABETES MELLITUS WITHOUT COMPLICATION, UNSPECIFIED WHETHER LONG TERM INSULIN USE: ICD-10-CM

## 2022-02-18 DIAGNOSIS — F43.23 ADJUSTMENT DISORDER WITH MIXED ANXIETY AND DEPRESSED MOOD: ICD-10-CM

## 2022-02-18 PROCEDURE — 3075F SYST BP GE 130 - 139MM HG: CPT | Mod: CPTII,S$GLB,, | Performed by: NURSE PRACTITIONER

## 2022-02-18 PROCEDURE — 99999 PR PBB SHADOW E&M-EST. PATIENT-LVL V: ICD-10-PCS | Mod: PBBFAC,,, | Performed by: NURSE PRACTITIONER

## 2022-02-18 PROCEDURE — 25000003 PHARM REV CODE 250: Performed by: INTERNAL MEDICINE

## 2022-02-18 PROCEDURE — 63600175 PHARM REV CODE 636 W HCPCS: Performed by: INTERNAL MEDICINE

## 2022-02-18 PROCEDURE — 3079F DIAST BP 80-89 MM HG: CPT | Mod: CPTII,S$GLB,, | Performed by: NURSE PRACTITIONER

## 2022-02-18 PROCEDURE — 1160F PR REVIEW ALL MEDS BY PRESCRIBER/CLIN PHARMACIST DOCUMENTED: ICD-10-PCS | Mod: CPTII,S$GLB,, | Performed by: NURSE PRACTITIONER

## 2022-02-18 PROCEDURE — 99215 OFFICE O/P EST HI 40 MIN: CPT | Mod: S$GLB,,, | Performed by: NURSE PRACTITIONER

## 2022-02-18 PROCEDURE — 3008F BODY MASS INDEX DOCD: CPT | Mod: CPTII,S$GLB,, | Performed by: NURSE PRACTITIONER

## 2022-02-18 PROCEDURE — 99999 PR PBB SHADOW E&M-EST. PATIENT-LVL V: CPT | Mod: PBBFAC,,, | Performed by: NURSE PRACTITIONER

## 2022-02-18 PROCEDURE — 3008F PR BODY MASS INDEX (BMI) DOCUMENTED: ICD-10-PCS | Mod: CPTII,S$GLB,, | Performed by: NURSE PRACTITIONER

## 2022-02-18 PROCEDURE — 3075F PR MOST RECENT SYSTOLIC BLOOD PRESS GE 130-139MM HG: ICD-10-PCS | Mod: CPTII,S$GLB,, | Performed by: NURSE PRACTITIONER

## 2022-02-18 PROCEDURE — 99215 PR OFFICE/OUTPT VISIT, EST, LEVL V, 40-54 MIN: ICD-10-PCS | Mod: S$GLB,,, | Performed by: NURSE PRACTITIONER

## 2022-02-18 PROCEDURE — 1159F MED LIST DOCD IN RCRD: CPT | Mod: CPTII,S$GLB,, | Performed by: NURSE PRACTITIONER

## 2022-02-18 PROCEDURE — 1160F RVW MEDS BY RX/DR IN RCRD: CPT | Mod: CPTII,S$GLB,, | Performed by: NURSE PRACTITIONER

## 2022-02-18 PROCEDURE — 96413 CHEMO IV INFUSION 1 HR: CPT

## 2022-02-18 PROCEDURE — 3079F PR MOST RECENT DIASTOLIC BLOOD PRESSURE 80-89 MM HG: ICD-10-PCS | Mod: CPTII,S$GLB,, | Performed by: NURSE PRACTITIONER

## 2022-02-18 PROCEDURE — 1159F PR MEDICATION LIST DOCUMENTED IN MEDICAL RECORD: ICD-10-PCS | Mod: CPTII,S$GLB,, | Performed by: NURSE PRACTITIONER

## 2022-02-18 RX ORDER — SODIUM CHLORIDE 9 MG/ML
INJECTION, SOLUTION INTRAVENOUS CONTINUOUS
Status: CANCELLED | OUTPATIENT
Start: 2022-02-25

## 2022-02-18 RX ORDER — HEPARIN 100 UNIT/ML
500 SYRINGE INTRAVENOUS
Status: CANCELLED | OUTPATIENT
Start: 2022-02-25

## 2022-02-18 RX ORDER — HEPARIN 100 UNIT/ML
500 SYRINGE INTRAVENOUS
Status: CANCELLED | OUTPATIENT
Start: 2022-03-04

## 2022-02-18 RX ORDER — SODIUM CHLORIDE 0.9 % (FLUSH) 0.9 %
10 SYRINGE (ML) INJECTION
Status: DISCONTINUED | OUTPATIENT
Start: 2022-02-18 | End: 2022-02-18 | Stop reason: HOSPADM

## 2022-02-18 RX ORDER — HEPARIN 100 UNIT/ML
500 SYRINGE INTRAVENOUS
Status: DISCONTINUED | OUTPATIENT
Start: 2022-02-18 | End: 2022-02-18 | Stop reason: HOSPADM

## 2022-02-18 RX ORDER — SODIUM CHLORIDE 9 MG/ML
INJECTION, SOLUTION INTRAVENOUS CONTINUOUS
Status: CANCELLED | OUTPATIENT
Start: 2022-02-18

## 2022-02-18 RX ORDER — SODIUM CHLORIDE 9 MG/ML
INJECTION, SOLUTION INTRAVENOUS CONTINUOUS
Status: CANCELLED | OUTPATIENT
Start: 2022-03-04

## 2022-02-18 RX ORDER — SODIUM CHLORIDE 0.9 % (FLUSH) 0.9 %
10 SYRINGE (ML) INJECTION
Status: CANCELLED | OUTPATIENT
Start: 2022-03-04

## 2022-02-18 RX ORDER — SODIUM CHLORIDE 9 MG/ML
INJECTION, SOLUTION INTRAVENOUS CONTINUOUS
Status: DISCONTINUED | OUTPATIENT
Start: 2022-02-18 | End: 2022-02-18 | Stop reason: HOSPADM

## 2022-02-18 RX ORDER — HEPARIN 100 UNIT/ML
500 SYRINGE INTRAVENOUS
Status: CANCELLED | OUTPATIENT
Start: 2022-02-18

## 2022-02-18 RX ORDER — SODIUM CHLORIDE 0.9 % (FLUSH) 0.9 %
10 SYRINGE (ML) INJECTION
Status: CANCELLED | OUTPATIENT
Start: 2022-02-25

## 2022-02-18 RX ORDER — SODIUM CHLORIDE 0.9 % (FLUSH) 0.9 %
10 SYRINGE (ML) INJECTION
Status: CANCELLED | OUTPATIENT
Start: 2022-02-18

## 2022-02-18 RX ORDER — FLUOXETINE HYDROCHLORIDE 20 MG/1
40 CAPSULE ORAL DAILY
Qty: 90 CAPSULE | Refills: 1 | Status: SHIPPED | OUTPATIENT
Start: 2022-02-18 | End: 2022-06-04 | Stop reason: SDUPTHER

## 2022-02-18 RX ADMIN — PACLITAXEL 300 MG: 100 INJECTION, POWDER, LYOPHILIZED, FOR SUSPENSION INTRAVENOUS at 02:02

## 2022-02-18 RX ADMIN — SODIUM CHLORIDE: 0.9 INJECTION, SOLUTION INTRAVENOUS at 01:02

## 2022-02-18 RX ADMIN — HEPARIN 500 UNITS: 100 SYRINGE at 03:02

## 2022-02-18 NOTE — PLAN OF CARE
Abraxane chemotherapy infusion administered, no reaction. Patient tolerated well. Port A Cath 20 gauge 3/4 inch needle deaccessed/ removed. No apparent distress noted. Discharge instructions given to patient. Patient understands instructions.

## 2022-02-21 ENCOUNTER — TELEPHONE (OUTPATIENT)
Dept: HEMATOLOGY/ONCOLOGY | Facility: CLINIC | Age: 47
End: 2022-02-21
Payer: MEDICARE

## 2022-02-21 ENCOUNTER — PATIENT MESSAGE (OUTPATIENT)
Dept: HEMATOLOGY/ONCOLOGY | Facility: CLINIC | Age: 47
End: 2022-02-21
Payer: MEDICARE

## 2022-02-21 NOTE — TELEPHONE ENCOUNTER
Spoke with patient to discuss reason for injection appointments scheduled for today and tomorrow.  Patient stated that he did not know about appointments and he will not be able to come today.  Requested appointments to be rescheduled for Tuesday and Wednesday.

## 2022-02-22 ENCOUNTER — INFUSION (OUTPATIENT)
Dept: INFUSION THERAPY | Facility: HOSPITAL | Age: 47
End: 2022-02-22
Attending: INTERNAL MEDICINE
Payer: MEDICARE

## 2022-02-22 DIAGNOSIS — D70.1 CHEMOTHERAPY-INDUCED NEUTROPENIA: Primary | ICD-10-CM

## 2022-02-22 DIAGNOSIS — T45.1X5A CHEMOTHERAPY-INDUCED NEUTROPENIA: Primary | ICD-10-CM

## 2022-02-22 PROCEDURE — 96372 THER/PROPH/DIAG INJ SC/IM: CPT

## 2022-02-22 PROCEDURE — 63600175 PHARM REV CODE 636 W HCPCS: Mod: JG | Performed by: NURSE PRACTITIONER

## 2022-02-22 RX ADMIN — FILGRASTIM-SNDZ 480 MCG: 480 INJECTION, SOLUTION INTRAVENOUS; SUBCUTANEOUS at 11:02

## 2022-02-22 NOTE — NURSING
Pt here for zarxio injection #1. Reviewed uses and side effects of medication. Pt verbalized understanding and agreed to proceed. Tolerated injection without difficulty.

## 2022-02-23 ENCOUNTER — INFUSION (OUTPATIENT)
Dept: INFUSION THERAPY | Facility: HOSPITAL | Age: 47
End: 2022-02-23
Attending: INTERNAL MEDICINE
Payer: MEDICARE

## 2022-02-23 DIAGNOSIS — D70.1 CHEMOTHERAPY-INDUCED NEUTROPENIA: Primary | ICD-10-CM

## 2022-02-23 DIAGNOSIS — T45.1X5A CHEMOTHERAPY-INDUCED NEUTROPENIA: Primary | ICD-10-CM

## 2022-02-23 PROCEDURE — 96372 THER/PROPH/DIAG INJ SC/IM: CPT

## 2022-02-23 PROCEDURE — 63600175 PHARM REV CODE 636 W HCPCS: Mod: JG | Performed by: NURSE PRACTITIONER

## 2022-02-23 RX ADMIN — FILGRASTIM-SNDZ 480 MCG: 480 INJECTION, SOLUTION INTRAVENOUS; SUBCUTANEOUS at 10:02

## 2022-02-25 ENCOUNTER — INFUSION (OUTPATIENT)
Dept: INFUSION THERAPY | Facility: HOSPITAL | Age: 47
End: 2022-02-25
Payer: MEDICARE

## 2022-02-25 ENCOUNTER — OFFICE VISIT (OUTPATIENT)
Dept: HEMATOLOGY/ONCOLOGY | Facility: CLINIC | Age: 47
End: 2022-02-25
Payer: MEDICARE

## 2022-02-25 VITALS
HEART RATE: 63 BPM | SYSTOLIC BLOOD PRESSURE: 152 MMHG | RESPIRATION RATE: 16 BRPM | OXYGEN SATURATION: 97 % | WEIGHT: 315 LBS | BODY MASS INDEX: 40.43 KG/M2 | TEMPERATURE: 98 F | DIASTOLIC BLOOD PRESSURE: 82 MMHG | HEIGHT: 74 IN

## 2022-02-25 VITALS
TEMPERATURE: 98 F | RESPIRATION RATE: 16 BRPM | HEIGHT: 74 IN | BODY MASS INDEX: 40.43 KG/M2 | OXYGEN SATURATION: 100 % | WEIGHT: 315 LBS | DIASTOLIC BLOOD PRESSURE: 75 MMHG | HEART RATE: 60 BPM | SYSTOLIC BLOOD PRESSURE: 134 MMHG

## 2022-02-25 DIAGNOSIS — Z17.1 MALIGNANT NEOPLASM INVOLVING BOTH NIPPLE AND AREOLA OF RIGHT BREAST IN MALE, ESTROGEN RECEPTOR NEGATIVE: Primary | ICD-10-CM

## 2022-02-25 DIAGNOSIS — C79.51 BONE METASTASES: ICD-10-CM

## 2022-02-25 DIAGNOSIS — I10 ESSENTIAL HYPERTENSION: ICD-10-CM

## 2022-02-25 DIAGNOSIS — F43.23 ADJUSTMENT DISORDER WITH MIXED ANXIETY AND DEPRESSED MOOD: ICD-10-CM

## 2022-02-25 DIAGNOSIS — E11.65 UNCONTROLLED TYPE 2 DIABETES MELLITUS WITH HYPERGLYCEMIA: ICD-10-CM

## 2022-02-25 DIAGNOSIS — C50.021 MALIGNANT NEOPLASM INVOLVING BOTH NIPPLE AND AREOLA OF RIGHT BREAST IN MALE, ESTROGEN RECEPTOR NEGATIVE: Primary | ICD-10-CM

## 2022-02-25 PROCEDURE — 1159F PR MEDICATION LIST DOCUMENTED IN MEDICAL RECORD: ICD-10-PCS | Mod: CPTII,S$GLB,, | Performed by: INTERNAL MEDICINE

## 2022-02-25 PROCEDURE — 96413 CHEMO IV INFUSION 1 HR: CPT

## 2022-02-25 PROCEDURE — 3077F PR MOST RECENT SYSTOLIC BLOOD PRESSURE >= 140 MM HG: ICD-10-PCS | Mod: CPTII,S$GLB,, | Performed by: INTERNAL MEDICINE

## 2022-02-25 PROCEDURE — 96367 TX/PROPH/DG ADDL SEQ IV INF: CPT

## 2022-02-25 PROCEDURE — 99214 OFFICE O/P EST MOD 30 MIN: CPT | Mod: S$GLB,,, | Performed by: INTERNAL MEDICINE

## 2022-02-25 PROCEDURE — 1159F MED LIST DOCD IN RCRD: CPT | Mod: CPTII,S$GLB,, | Performed by: INTERNAL MEDICINE

## 2022-02-25 PROCEDURE — 25000003 PHARM REV CODE 250: Performed by: INTERNAL MEDICINE

## 2022-02-25 PROCEDURE — 1160F PR REVIEW ALL MEDS BY PRESCRIBER/CLIN PHARMACIST DOCUMENTED: ICD-10-PCS | Mod: CPTII,S$GLB,, | Performed by: INTERNAL MEDICINE

## 2022-02-25 PROCEDURE — 3008F BODY MASS INDEX DOCD: CPT | Mod: CPTII,S$GLB,, | Performed by: INTERNAL MEDICINE

## 2022-02-25 PROCEDURE — 3077F SYST BP >= 140 MM HG: CPT | Mod: CPTII,S$GLB,, | Performed by: INTERNAL MEDICINE

## 2022-02-25 PROCEDURE — 99214 PR OFFICE/OUTPT VISIT, EST, LEVL IV, 30-39 MIN: ICD-10-PCS | Mod: S$GLB,,, | Performed by: INTERNAL MEDICINE

## 2022-02-25 PROCEDURE — 1160F RVW MEDS BY RX/DR IN RCRD: CPT | Mod: CPTII,S$GLB,, | Performed by: INTERNAL MEDICINE

## 2022-02-25 PROCEDURE — 96360 HYDRATION IV INFUSION INIT: CPT

## 2022-02-25 PROCEDURE — 99999 PR PBB SHADOW E&M-EST. PATIENT-LVL V: ICD-10-PCS | Mod: PBBFAC,,, | Performed by: INTERNAL MEDICINE

## 2022-02-25 PROCEDURE — 96361 HYDRATE IV INFUSION ADD-ON: CPT

## 2022-02-25 PROCEDURE — 3079F PR MOST RECENT DIASTOLIC BLOOD PRESSURE 80-89 MM HG: ICD-10-PCS | Mod: CPTII,S$GLB,, | Performed by: INTERNAL MEDICINE

## 2022-02-25 PROCEDURE — A4216 STERILE WATER/SALINE, 10 ML: HCPCS | Performed by: INTERNAL MEDICINE

## 2022-02-25 PROCEDURE — 63600175 PHARM REV CODE 636 W HCPCS: Performed by: INTERNAL MEDICINE

## 2022-02-25 PROCEDURE — 99999 PR PBB SHADOW E&M-EST. PATIENT-LVL V: CPT | Mod: PBBFAC,,, | Performed by: INTERNAL MEDICINE

## 2022-02-25 PROCEDURE — 3008F PR BODY MASS INDEX (BMI) DOCUMENTED: ICD-10-PCS | Mod: CPTII,S$GLB,, | Performed by: INTERNAL MEDICINE

## 2022-02-25 PROCEDURE — 3079F DIAST BP 80-89 MM HG: CPT | Mod: CPTII,S$GLB,, | Performed by: INTERNAL MEDICINE

## 2022-02-25 RX ORDER — SODIUM CHLORIDE 0.9 % (FLUSH) 0.9 %
10 SYRINGE (ML) INJECTION
Status: CANCELLED | OUTPATIENT
Start: 2022-03-11

## 2022-02-25 RX ORDER — HEPARIN 100 UNIT/ML
500 SYRINGE INTRAVENOUS
Status: DISCONTINUED | OUTPATIENT
Start: 2022-02-25 | End: 2022-02-25 | Stop reason: HOSPADM

## 2022-02-25 RX ORDER — SODIUM CHLORIDE 9 MG/ML
INJECTION, SOLUTION INTRAVENOUS CONTINUOUS
Status: DISCONTINUED | OUTPATIENT
Start: 2022-02-25 | End: 2022-02-25 | Stop reason: HOSPADM

## 2022-02-25 RX ORDER — ZOLEDRONIC ACID 0.04 MG/ML
4 INJECTION, SOLUTION INTRAVENOUS
Status: COMPLETED | OUTPATIENT
Start: 2022-02-25 | End: 2022-02-25

## 2022-02-25 RX ORDER — SODIUM CHLORIDE 0.9 % (FLUSH) 0.9 %
10 SYRINGE (ML) INJECTION
Status: DISCONTINUED | OUTPATIENT
Start: 2022-02-25 | End: 2022-02-25 | Stop reason: HOSPADM

## 2022-02-25 RX ORDER — HEPARIN 100 UNIT/ML
500 SYRINGE INTRAVENOUS
Status: CANCELLED | OUTPATIENT
Start: 2022-03-11

## 2022-02-25 RX ORDER — ZOLEDRONIC ACID 0.04 MG/ML
4 INJECTION, SOLUTION INTRAVENOUS
Status: CANCELLED | OUTPATIENT
Start: 2022-03-11

## 2022-02-25 RX ADMIN — Medication 10 ML: at 11:02

## 2022-02-25 RX ADMIN — SODIUM CHLORIDE: 0.9 INJECTION, SOLUTION INTRAVENOUS at 09:02

## 2022-02-25 RX ADMIN — HEPARIN 500 UNITS: 100 SYRINGE at 11:02

## 2022-02-25 RX ADMIN — PACLITAXEL 300 MG: 100 INJECTION, POWDER, LYOPHILIZED, FOR SUSPENSION INTRAVENOUS at 10:02

## 2022-02-25 RX ADMIN — ZOLEDRONIC ACID 4 MG: 0.04 INJECTION, SOLUTION INTRAVENOUS at 10:02

## 2022-02-25 NOTE — PROGRESS NOTES
Subjective:       Patient ID: Paul Li Jr. is a 46 y.o. male.      Chief Complaint: Metastatic breast cancer, negative genetic testing  Returns for cycle   Reports mental stressors    Most recent Imagin/15/2022 PET Scan:  FINDINGS:  Quality of the study: Adequate.  In the head and neck, there are no hypermetabolic lesions worrisome for malignancy. There are no hypermetabolic mucosal lesions, and there are no pathologically enlarged or hypermetabolic lymph nodes.  In the chest, there are no hypermetabolic lesions worrisome for malignancy.  There are no pathologically enlarged or hypermetabolic lymph nodes.  Stable right lung pulmonary micronodules measuring 0.4 cm (series 2, image 83), below the size threshold for PET. No new pulmonary nodule or mass.  Postoperative changes of right mastectomy and axillary dissection.  In the abdomen and pelvis, there is physiologic tracer distribution within the abdominal organs and excretion into the genitourinary system.  In the bones, there is normalized uptake within prior hypermetabolic osseous lesions which demonstrate progressed sclerosis on CT.  Prominent sclerosis involving the T12 vertebral body, sacrum, and iliac bone.  Mild uptake within the left pubic symphysis without correlate lesion on CT, potentially representing physiologic marrow uptake.  In the extremities, there are no hypermetabolic lesions worrisome for malignancy.  Incidental CT findings: Sinus disease.  Left-sided chest port with catheter tip terminating in the SVC.  Diffuse hypoattenuation of the liver compatible with hepatic steatosis.  Impression:  Favorable response to therapy with normalized uptake and increased sclerosis of osseous lesions.  No new hypermetabolic tumor.  Stable right lung pulmonary micronodules, below the size threshold for PET.  Attention on follow-up.    Returns for follow up and C7D8 Abraxane and Aplelisib.  Eating well, drinking well  Pain with growth factor injections, no  other pain and this resolved  Energy level is good  No fevers, chills, or infectious complaints    Diagnosis:  Metastatic TNBC progressed (prior Stage IB ([id number]) triple negative invasive ductal carcinoma with lobular features of right breast, retroareolar, Grade 2, Ki67 80%, diagnosed 2019)    Oncology History:  Metastatic  Set up for scans (due to back pain) which are consistent for recurrence.   Imagin/3/2021 MRI T/L spine:  FINDINGS:  Alignment: Within normal limits.  Vertebrae: Low T1 signal intensity in the left T12 vertebral body extending to the left pedicle, S1 and S2 vertebral bodies with abnormal enhancement.  The vertebral heights are well maintained.  No evidence of acute fractures.  Abnormal appearance of the LEFT sacrum and ilium as well.  Discs: Degenerative disease of the level of L4-L5 with posterior annular fissure.  Conus appears within normal limits terminating at the level of the L2. level.  Cauda equina appears unremarkable.  Mild circumferential disc bulging at the level of T10-T11 abutting the ventral thecal sac.  No significant spinal canal stenosis or neural foraminal narrowing throughout the thoracic spine.  No significant spinal canal stenosis or neural foraminal narrowing throughout the lumbar spine.  Paraspinous muscles and soft tissues: Subcentimeter focal enhancement in the posterior column along the supraspinous ligament at the level of L1-L2 (sagittal series 21, image 10) potentially inflammatory versus neoplastic.  Impression:  Abnormal appearance of the T12, S1, S2 vertebral bodies as well as LEFT sacrum and ilium concerning for metastatic disease in this patient with history of breast cancer.  No evidence for significant central canal narrowing.  Additional findings, as above.  This report was flagged in Epic as abnormal.    2021 PET scan:  COMPARISON:  MRI thoracic and lumbar spine 2021  FINDINGS:  Quality of the study: Adequate.  In the head and neck, there  are no hypermetabolic lesions worrisome for malignancy. There are no hypermetabolic mucosal lesions, and there are no pathologically enlarged or hypermetabolic lymph nodes.  In the chest, there is postoperative change of right mastectomy/right axillary lymph node dissection.  There is low level hypermetabolic activity with mild soft tissue thickening in the skin/superficial subcutaneous fat of the right chest wall (axial fused image 78) with SUV max 3.6.  There is soft tissue thickening measuring approximately 1.8 x 7.9 cm in the subcutaneous fat of the right chest wall (axial series 3, image 84) with SUV max 3.1.  There are a few bilateral pulmonary nodules measuring up to 0.3 cm in the left lung (axial series 3, image 88) and 0.5 cm in the right lung (axial series 3, image 84).  These nodules are too small to characterize by PET.  There are no pathologically enlarged or hypermetabolic lymph nodes.  In the abdomen and pelvis, there is physiologic tracer distribution within the abdominal organs and excretion into the genitourinary system.  Subtle sen mesentery and multiple prominent mesenteric lymph nodes measuring up to 1.8 cm in long axis with background activity.  In the bones, there are several hypermetabolic lesions worrisome for malignancy.  Sclerotic lesion in the left T12 vertebral body (axial series 3, image 131) with SUV max 12.  Sclerotic lesions in the sacrum (for example axial series 3, image 188) with SUV max 9.7.  Sclerotic lesions in the left iliac bone (for example axial series 3, image 205) with SUV max 6.  In the extremities, there are no hypermetabolic lesions worrisome for malignancy.  Incidental findings:  Bilateral maxillary antra mucous retention cysts.  Fat containing right inguinal hernia.  Impression:  1. In this patient with right breast carcinoma status post mastectomy/right axillary lymph node dissection, there are multiple sclerotic lesions in the T12 vertebral body, sacrum, and left  iliac bone with hypermetabolic activity concerning for active metastatic disease and in keeping with findings on MRI 06/03/2021.  2. Additionally there is low-level hypermetabolic activity with soft tissue thickening in the right chest wall as detailed above.  These findings are nonspecific and may be related to postoperative/post radiation change, with recurrent malignancy not excluded.  3. Nonspecific mesenteric haziness and lymph nodes which may indicate mesenteric adenitis.  Recommend clinical correlation and attention on follow-up.  4. Additional findings as above.  I, Sohan Tillman MD, attest that I reviewed and interpreted the images.    In summary,   Started aplelisib 8/22/2021   Abraxane C1 started 8/13/2021  We had sent Guardant and discussed results with Molecular tumor board  He also had a repeat bx to confirm metastatic triple negative breast cancer  Plan:   Nab-Paclitaxel and Alpelisib  Reference: https://ascopubs.org/doi/abs/10.1200/JCO.[email or web address]_[email or web address]  Also on Zometa-     - C1D1 Abraxane 8/13/2021  - Started aplelisib 8/22/2021               Oncology History   Malignant neoplasm involving both nipple and areola of right breast in male, estrogen receptor negative   5/1/2019 Imaging Significant Findings     Mammogram and US  Impression:  Right  Mass: Right breast 14 mm mass at the retroareolar anterior position. Assessment: 4 - Suspicious finding. Biopsy is recommended.   Left  There is no mammographic or sonographic evidence of malignancy.  Recommendation:  Biopsy is recommended.      5/2/2019 Biopsy     BREAST, RIGHT, RETROAREOLAR MASS, ULTRASOUND-GUIDED BIOPSY:  Invasive ductal carcinoma with lobular features.  Size of invasive carcinoma: 5 MM in greatest linear dimension within a single      5/2/2019 Breast Tumor Markers     Estrogen: Negative  Progesterone: Negative  HER2: Negative  Ki67: 80      5/17/2019 Cancer Staged     Staging form: Breast, AJCC 8th Edition  - Clinical stage from  5/17/2019: Stage IB (cT1c, cN0, cM0, G2, ER-, WI-, HER2-) - Signed by Richa Bahena MD on 5/17/2019 5/27/2019 - 7/21/2019 Chemotherapy     Treatment Summary   Plan Name: OP BREAST DOSE-DENSE AC - DOXORUBICIN CYCLOPHOSPHAMIDE Q2W  Treatment Goal: Curative  Status: Inactive  Start Date: 5/27/2019  End Date: 7/9/2019  Provider: Richa Bahena MD  Chemotherapy: DOXOrubicin chemo injection 186 mg, 60 mg/m2 = 186 mg, Intravenous, Clinic/HOD 1 time, 4 of 4 cycles  Administration: 186 mg (5/27/2019), 186 mg (6/10/2019), 186 mg (6/24/2019), 186 mg (7/8/2019)  cyclophosphamide (CYTOXAN) 600 mg/m2 = 1,865 mg in sodium chloride 0.9% 250 mL chemo infusion, 600 mg/m2 = 1,865 mg, Intravenous, Clinic/HOD 1 time, 4 of 4 cycles  Administration: 1,865 mg (5/27/2019), 1,865 mg (6/10/2019), 1,865 mg (6/24/2019), 1,865 mg (7/8/2019)      7/22/2019 - 10/15/2019 Chemotherapy     Treatment Summary   Plan Name: OP PACLITAXEL QW  Treatment Goal: Curative  Status: Inactive  Start Date: 7/24/2019  End Date: 10/8/2019  Provider: Richa Bahena MD  Chemotherapy: PACLitaxel (TAXOL) 80 mg/m2 = 246 mg in sodium chloride 0.9% 250 mL chemo infusion, 80 mg/m2 = 246 mg, Intravenous, Clinic/HOD 1 time, 12 of 12 cycles  Dose modification: 60 mg/m2 (original dose 80 mg/m2, Cycle 3), 55 mg/m2 (original dose 80 mg/m2, Cycle 7), 60 mg/m2 (original dose 80 mg/m2, Cycle 7), 50 mg/m2 (original dose 80 mg/m2, Cycle 9), 50 mg/m2 (original dose 80 mg/m2, Cycle 10)  Administration: 246 mg (7/24/2019), 246 mg (7/31/2019), 186 mg (8/7/2019), 186 mg (8/14/2019), 186 mg (8/21/2019), 186 mg (8/28/2019), 186 mg (9/4/2019), 174 mg (9/11/2019), 156 mg (9/18/2019), 156 mg (9/25/2019), 156 mg (10/1/2019), 156 mg (10/8/2019)      11/22/2019 Breast Surgery     On 11/22/19 Dr milan performed a right breast mastectomy with SLN biopsy with pathology showing grade 2, 6 mm IDC with extensive treatment effect, no LVI with 1 LN positive 4 mm, negative margins. The on  12/17/19, Dr Whyte performed right axillary dissection with pathology still pending.      11/22/2019 Cancer Staged     ypT1b N1      12/17/2019 Breast Surgery     Surgery: Dr Shima Whyte, [phone] performed right ALND with pathology showing 14 negative lymph nodes.             1/14/2020 Tumor Conference      Post mastectomy radiation therapy: Xeloda, then possible Keytruda trial .      2/3/2020 - 3/23/2020 Radiation Therapy     Treating physician: Speedy Nair MD   Total Dose: 50.4 Gy to the chest wall and regional lymphatics  10 Gy boost to the prostate.   Fractions: 28 fractions at 1.8 Gy per fraction  5 fractions at 2 Gy per fraction       2/28/2020 -  Chemotherapy     * 4/15/2020 - 9/28/20 completed 6 cycles adjuvant Xeloda 1000 mg/m2 bid days 1-14 every 21 days  Treatment Summary   Plan Name: OP CAPECITABINE 1250MG/M2 BID Q3W  Treatment Goal: Curative  Status: Active  Start Date: 3/20/2020  End Date: 3/20/2020  Provider: Richa Bahena MD  Chemotherapy: [No matching medication found in this treatment plan]             Review of Systems   Constitutional: Negative for activity change, appetite change, chills, fatigue, fever and unexpected weight change.   HENT: Negative for dental problem, postnasal drip, rhinorrhea, sinus pressure/congestion, sore throat, tinnitus, trouble swallowing and voice change.    Eyes: Negative for visual disturbance.   Respiratory: Negative for cough, shortness of breath and wheezing.    Cardiovascular: Negative for chest pain, palpitations and leg swelling.   Gastrointestinal: Negative for abdominal distention, abdominal pain, blood in stool, constipation, diarrhea, nausea and vomiting.   Endocrine: Negative for cold intolerance and heat intolerance.   Genitourinary: Negative for decreased urine volume, difficulty urinating, dysuria, frequency, hematuria and urgency.   Musculoskeletal: Negative for arthralgias, back pain, gait problem, joint swelling, myalgias, neck pain  and neck stiffness.   Integumentary:  Negative for color change, pallor, rash and wound.   Neurological: Negative for dizziness, weakness, light-headedness, numbness and headaches.   Hematological: Negative for adenopathy. Does not bruise/bleed easily.   Psychiatric/Behavioral: Negative for dysphoric mood and sleep disturbance. The patient is nervous/anxious.          Objective:      Physical Exam  Vitals and nursing note reviewed.   Constitutional:       General: He is not in acute distress.     Appearance: Normal appearance. He is well-developed. He is obese. He is not diaphoretic.      Comments: Presents alone  Pleasant  ECOG= 0   HENT:      Head: Normocephalic and atraumatic.   Eyes:      General: No scleral icterus.     Extraocular Movements: Extraocular movements intact.      Conjunctiva/sclera: Conjunctivae normal.      Pupils: Pupils are equal, round, and reactive to light.   Neck:      Thyroid: No thyromegaly.      Trachea: No tracheal deviation.   Cardiovascular:      Rate and Rhythm: Normal rate and regular rhythm.      Heart sounds: Normal heart sounds. No murmur heard.    No friction rub. No gallop.   Pulmonary:      Effort: Pulmonary effort is normal. No respiratory distress.      Breath sounds: Normal breath sounds. No wheezing, rhonchi or rales.   Chest:      Chest wall: No tenderness.   Abdominal:      General: Bowel sounds are normal. There is no distension.      Palpations: Abdomen is soft. There is no mass.      Tenderness: There is no abdominal tenderness. There is no guarding or rebound.      Comments: No organomegaly   Musculoskeletal:         General: No swelling, tenderness or deformity. Normal range of motion.      Cervical back: Normal range of motion and neck supple. No rigidity or tenderness.      Right lower leg: Edema (trace, chronic) present.      Left lower leg: Edema (trace, chronic) present.   Lymphadenopathy:      Cervical: No cervical adenopathy.   Skin:     General: Skin is warm  and dry.      Coloration: Skin is not jaundiced or pale.      Findings: No erythema or rash.   Neurological:      General: No focal deficit present.      Mental Status: He is alert and oriented to person, place, and time.      Cranial Nerves: No cranial nerve deficit.      Sensory: No sensory deficit.      Motor: No weakness or abnormal muscle tone.      Coordination: Coordination normal.      Gait: Gait normal.      Deep Tendon Reflexes: Reflexes are normal and symmetric. Reflexes normal.   Psychiatric:         Mood and Affect: Mood normal.         Behavior: Behavior normal.         Thought Content: Thought content normal.         Judgment: Judgment normal.       Labs- reviewed  Assessment:       Problem List Items Addressed This Visit     Uncontrolled type 2 diabetes mellitus with hyperglycemia    Malignant neoplasm involving both nipple and areola of right breast in male, estrogen receptor negative - Primary    Essential hypertension    Bone metastases    Adjustment disorder with mixed anxiety and depressed mood          Plan:     DM, HTN-- better control    Metastatic male breast cancer (negative genetics) with bone mets  On aplelisib and abraxane with response  RTC 1 week (3/4) with labs only and treatment C7D15   RTC 3 weeks (3/18) for C8D1 of treatment and EP and labs  Zometa q 4 weeks continues    Route Chart for Scheduling    Med Onc Chart Routing      Follow up with physician . On 3/4 cbc, cmp only (no EP) and chemo after; on 3/25 needs EP cbc, cmp and chemo and Zometa C8D8, on 4/1 needs cbc, cmp only (no EP) and chemo C8D15   Follow up with [facility] . On 3/18 needs EP, cbc, cmp and chemo C8D1   Labs    Imaging    Pharmacy appointment    Other referrals Additional referrals needed         Treatment Plan Information   OP BREAST NAB-PACLITAXEL D1, D8, D15 + ALPELISIB    Rosa Linn MD   Upcoming Treatment Dates - OP BREAST NAB-PACLITAXEL D1, D8, D15 + ALPELISIB     2/25/2022       Chemotherapy        PACLitaxel-protein bound (ABRAXANE) 100 mg/m2 = 300 mg in 60 mL infusion  3/4/2022       Chemotherapy       PACLitaxel-protein bound (ABRAXANE) 100 mg/m2 = 300 mg in 60 mL infusion  3/18/2022       Chemotherapy       PACLitaxel-protein bound (ABRAXANE) 100 mg/m2 = 300 mg in 60 mL infusion  3/25/2022       Chemotherapy       PACLitaxel-protein bound (ABRAXANE) 100 mg/m2 = 300 mg in 60 mL infusion    Supportive Plan Information  OP FILGRASTIM 480 MCG   Michelle Grayson NP   Upcoming Treatment Dates - OP FILGRASTIM 480 MCG    2/24/2022       Medications       filgrastim-sndz (ZARXIO) injection 480 mcg/0.8 mL (Preferred Regimen)  2/25/2022       Medications       filgrastim-sndz (ZARXIO) injection 480 mcg/0.8 mL (Preferred Regimen)  2/26/2022       Medications       filgrastim-sndz (ZARXIO) injection 480 mcg/0.8 mL (Preferred Regimen)  2/27/2022       Medications       filgrastim-sndz (ZARXIO) injection 480 mcg/0.8 mL (Preferred Regimen)    Therapy Plan Information  PORT FLUSH  Flushes  heparin, porcine (PF) 100 unit/mL injection flush 500 Units  500 Units, Intravenous, Every visit  sodium chloride 0.9% flush 10 mL  10 mL, Intravenous, Every visit    ZOLEDRONIC ACID (ZOMETA) IV  Medications  zoledronic 4 mg/100 mL infusion 4 mg  4 mg, Intravenous, Every 4 weeks  Flushes  sodium chloride 0.9% 250 mL flush bag  Intravenous, Every 4 weeks  sodium chloride 0.9% flush 10 mL  10 mL, Intravenous, Every 4 weeks  heparin, porcine (PF) 100 unit/mL injection flush 500 Units  500 Units, Intravenous, Every 4 weeks  alteplase injection 2 mg  2 mg, Intra-Catheter, Every 4 weeks

## 2022-02-25 NOTE — PLAN OF CARE
Pt received Abraxane & Zometa today and tolerated well, without complications. Educated patient about Abraxane & Zometa (indications, side effects, possible reactions, chemotherapy precautions) and verbalized understanding.  VSS. CW port positive for blood return, saline flushed, Heparin flush instilled to dwell and de accessed prior to DC. Pt DC with no distress noted, ambulated off unit w/o event, via self, placed.

## 2022-03-02 ENCOUNTER — TELEPHONE (OUTPATIENT)
Dept: INFUSION THERAPY | Facility: HOSPITAL | Age: 47
End: 2022-03-02
Payer: MEDICARE

## 2022-03-04 ENCOUNTER — INFUSION (OUTPATIENT)
Dept: INFUSION THERAPY | Facility: HOSPITAL | Age: 47
End: 2022-03-04
Attending: INTERNAL MEDICINE
Payer: MEDICARE

## 2022-03-04 VITALS
TEMPERATURE: 98 F | DIASTOLIC BLOOD PRESSURE: 74 MMHG | SYSTOLIC BLOOD PRESSURE: 145 MMHG | RESPIRATION RATE: 18 BRPM | HEART RATE: 73 BPM

## 2022-03-04 DIAGNOSIS — C50.021 MALIGNANT NEOPLASM INVOLVING BOTH NIPPLE AND AREOLA OF RIGHT BREAST IN MALE, ESTROGEN RECEPTOR NEGATIVE: Primary | ICD-10-CM

## 2022-03-04 DIAGNOSIS — Z17.1 MALIGNANT NEOPLASM INVOLVING BOTH NIPPLE AND AREOLA OF RIGHT BREAST IN MALE, ESTROGEN RECEPTOR NEGATIVE: Primary | ICD-10-CM

## 2022-03-04 PROCEDURE — 63600175 PHARM REV CODE 636 W HCPCS: Performed by: INTERNAL MEDICINE

## 2022-03-04 PROCEDURE — 25000003 PHARM REV CODE 250: Performed by: INTERNAL MEDICINE

## 2022-03-04 PROCEDURE — A4216 STERILE WATER/SALINE, 10 ML: HCPCS | Performed by: INTERNAL MEDICINE

## 2022-03-04 PROCEDURE — 96361 HYDRATE IV INFUSION ADD-ON: CPT

## 2022-03-04 PROCEDURE — 96413 CHEMO IV INFUSION 1 HR: CPT

## 2022-03-04 RX ORDER — HEPARIN 100 UNIT/ML
500 SYRINGE INTRAVENOUS
Status: DISCONTINUED | OUTPATIENT
Start: 2022-03-04 | End: 2022-03-04 | Stop reason: HOSPADM

## 2022-03-04 RX ORDER — SODIUM CHLORIDE 0.9 % (FLUSH) 0.9 %
10 SYRINGE (ML) INJECTION
Status: DISCONTINUED | OUTPATIENT
Start: 2022-03-04 | End: 2022-03-04 | Stop reason: HOSPADM

## 2022-03-04 RX ORDER — SODIUM CHLORIDE 9 MG/ML
INJECTION, SOLUTION INTRAVENOUS CONTINUOUS
Status: DISCONTINUED | OUTPATIENT
Start: 2022-03-04 | End: 2022-03-04 | Stop reason: HOSPADM

## 2022-03-04 RX ADMIN — PACLITAXEL 300 MG: 100 INJECTION, POWDER, LYOPHILIZED, FOR SUSPENSION INTRAVENOUS at 03:03

## 2022-03-04 RX ADMIN — Medication 10 ML: at 04:03

## 2022-03-04 RX ADMIN — SODIUM CHLORIDE: 0.9 INJECTION, SOLUTION INTRAVENOUS at 03:03

## 2022-03-04 RX ADMIN — HEPARIN 500 UNITS: 100 SYRINGE at 04:03

## 2022-03-04 NOTE — PLAN OF CARE
1500 pt here for Abraxane D15C7 and 500 ml normal saline, labs, hx, meds, allergies reviewed, pt with no new complaints at this time, reclined in chair, continue to monitor

## 2022-03-04 NOTE — PLAN OF CARE
1640 pt tolerated abraxane and 500 ml normal slaine without issue, pt to rtc 3/18/22, no distress noted upon d/c to home

## 2022-03-10 DIAGNOSIS — G89.3 NEOPLASM RELATED PAIN: ICD-10-CM

## 2022-03-10 RX ORDER — HYDROCODONE BITARTRATE AND ACETAMINOPHEN 10; 325 MG/1; MG/1
1 TABLET ORAL EVERY 6 HOURS PRN
Qty: 90 TABLET | Refills: 0 | Status: SHIPPED | OUTPATIENT
Start: 2022-03-10 | End: 2022-04-08 | Stop reason: SDUPTHER

## 2022-03-17 ENCOUNTER — LAB VISIT (OUTPATIENT)
Dept: LAB | Facility: HOSPITAL | Age: 47
End: 2022-03-17
Attending: INTERNAL MEDICINE
Payer: MEDICARE

## 2022-03-17 ENCOUNTER — OFFICE VISIT (OUTPATIENT)
Dept: HEMATOLOGY/ONCOLOGY | Facility: CLINIC | Age: 47
End: 2022-03-17
Payer: MEDICARE

## 2022-03-17 VITALS
HEIGHT: 73 IN | SYSTOLIC BLOOD PRESSURE: 134 MMHG | TEMPERATURE: 98 F | WEIGHT: 315 LBS | DIASTOLIC BLOOD PRESSURE: 72 MMHG | HEART RATE: 69 BPM | OXYGEN SATURATION: 98 % | BODY MASS INDEX: 41.75 KG/M2 | RESPIRATION RATE: 18 BRPM

## 2022-03-17 DIAGNOSIS — C79.51 BONE METASTASES: ICD-10-CM

## 2022-03-17 DIAGNOSIS — C50.021 MALIGNANT NEOPLASM INVOLVING BOTH NIPPLE AND AREOLA OF RIGHT BREAST IN MALE, ESTROGEN RECEPTOR NEGATIVE: Primary | ICD-10-CM

## 2022-03-17 DIAGNOSIS — G89.3 NEOPLASM RELATED PAIN: ICD-10-CM

## 2022-03-17 DIAGNOSIS — T45.1X5A CHEMOTHERAPY-INDUCED NEUTROPENIA: ICD-10-CM

## 2022-03-17 DIAGNOSIS — E07.9 THYROID DISORDER: ICD-10-CM

## 2022-03-17 DIAGNOSIS — D63.0 ANEMIA IN NEOPLASTIC DISEASE: ICD-10-CM

## 2022-03-17 DIAGNOSIS — Z17.1 MALIGNANT NEOPLASM INVOLVING BOTH NIPPLE AND AREOLA OF RIGHT BREAST IN MALE, ESTROGEN RECEPTOR NEGATIVE: ICD-10-CM

## 2022-03-17 DIAGNOSIS — I10 ESSENTIAL HYPERTENSION: ICD-10-CM

## 2022-03-17 DIAGNOSIS — D70.1 CHEMOTHERAPY-INDUCED NEUTROPENIA: ICD-10-CM

## 2022-03-17 DIAGNOSIS — C50.021 MALIGNANT NEOPLASM INVOLVING BOTH NIPPLE AND AREOLA OF RIGHT BREAST IN MALE, ESTROGEN RECEPTOR NEGATIVE: ICD-10-CM

## 2022-03-17 DIAGNOSIS — Z17.1 MALIGNANT NEOPLASM INVOLVING BOTH NIPPLE AND AREOLA OF RIGHT BREAST IN MALE, ESTROGEN RECEPTOR NEGATIVE: Primary | ICD-10-CM

## 2022-03-17 DIAGNOSIS — D53.9 NUTRITIONAL ANEMIA, UNSPECIFIED: ICD-10-CM

## 2022-03-17 LAB
ALBUMIN SERPL BCP-MCNC: 3.7 G/DL (ref 3.5–5.2)
ALP SERPL-CCNC: 86 U/L (ref 55–135)
ALT SERPL W/O P-5'-P-CCNC: 20 U/L (ref 10–44)
ANION GAP SERPL CALC-SCNC: 8 MMOL/L (ref 8–16)
AST SERPL-CCNC: 18 U/L (ref 10–40)
BASOPHILS # BLD AUTO: 0.02 K/UL (ref 0–0.2)
BASOPHILS NFR BLD: 0.4 % (ref 0–1.9)
BILIRUB SERPL-MCNC: 0.5 MG/DL (ref 0.1–1)
BUN SERPL-MCNC: 14 MG/DL (ref 6–20)
CALCIUM SERPL-MCNC: 8.4 MG/DL (ref 8.7–10.5)
CHLORIDE SERPL-SCNC: 104 MMOL/L (ref 95–110)
CO2 SERPL-SCNC: 27 MMOL/L (ref 23–29)
CREAT SERPL-MCNC: 1.4 MG/DL (ref 0.5–1.4)
DIFFERENTIAL METHOD: ABNORMAL
EOSINOPHIL # BLD AUTO: 0 K/UL (ref 0–0.5)
EOSINOPHIL NFR BLD: 0.7 % (ref 0–8)
ERYTHROCYTE [DISTWIDTH] IN BLOOD BY AUTOMATED COUNT: 16.1 % (ref 11.5–14.5)
EST. GFR  (AFRICAN AMERICAN): >60 ML/MIN/1.73 M^2
EST. GFR  (NON AFRICAN AMERICAN): >60 ML/MIN/1.73 M^2
GLUCOSE SERPL-MCNC: 199 MG/DL (ref 70–110)
HCT VFR BLD AUTO: 31.8 % (ref 40–54)
HGB BLD-MCNC: 10.1 G/DL (ref 14–18)
IMM GRANULOCYTES # BLD AUTO: 0.06 K/UL (ref 0–0.04)
IMM GRANULOCYTES NFR BLD AUTO: 1.3 % (ref 0–0.5)
LYMPHOCYTES # BLD AUTO: 1.8 K/UL (ref 1–4.8)
LYMPHOCYTES NFR BLD: 40.3 % (ref 18–48)
MCH RBC QN AUTO: 26.5 PG (ref 27–31)
MCHC RBC AUTO-ENTMCNC: 31.8 G/DL (ref 32–36)
MCV RBC AUTO: 84 FL (ref 82–98)
MONOCYTES # BLD AUTO: 0.4 K/UL (ref 0.3–1)
MONOCYTES NFR BLD: 9.7 % (ref 4–15)
NEUTROPHILS # BLD AUTO: 2.2 K/UL (ref 1.8–7.7)
NEUTROPHILS NFR BLD: 47.6 % (ref 38–73)
NRBC BLD-RTO: 0 /100 WBC
PLATELET # BLD AUTO: 248 K/UL (ref 150–450)
PMV BLD AUTO: 10 FL (ref 9.2–12.9)
POTASSIUM SERPL-SCNC: 4.4 MMOL/L (ref 3.5–5.1)
PROT SERPL-MCNC: 7 G/DL (ref 6–8.4)
RBC # BLD AUTO: 3.81 M/UL (ref 4.6–6.2)
SODIUM SERPL-SCNC: 139 MMOL/L (ref 136–145)
WBC # BLD AUTO: 4.54 K/UL (ref 3.9–12.7)

## 2022-03-17 PROCEDURE — 3075F SYST BP GE 130 - 139MM HG: CPT | Mod: CPTII,S$GLB,, | Performed by: NURSE PRACTITIONER

## 2022-03-17 PROCEDURE — 36415 COLL VENOUS BLD VENIPUNCTURE: CPT | Performed by: INTERNAL MEDICINE

## 2022-03-17 PROCEDURE — 99999 PR PBB SHADOW E&M-EST. PATIENT-LVL IV: ICD-10-PCS | Mod: PBBFAC,,, | Performed by: NURSE PRACTITIONER

## 2022-03-17 PROCEDURE — 1159F PR MEDICATION LIST DOCUMENTED IN MEDICAL RECORD: ICD-10-PCS | Mod: CPTII,S$GLB,, | Performed by: NURSE PRACTITIONER

## 2022-03-17 PROCEDURE — 3008F BODY MASS INDEX DOCD: CPT | Mod: CPTII,S$GLB,, | Performed by: NURSE PRACTITIONER

## 2022-03-17 PROCEDURE — 80053 COMPREHEN METABOLIC PANEL: CPT | Performed by: INTERNAL MEDICINE

## 2022-03-17 PROCEDURE — 99215 OFFICE O/P EST HI 40 MIN: CPT | Mod: S$GLB,,, | Performed by: NURSE PRACTITIONER

## 2022-03-17 PROCEDURE — 99999 PR PBB SHADOW E&M-EST. PATIENT-LVL IV: CPT | Mod: PBBFAC,,, | Performed by: NURSE PRACTITIONER

## 2022-03-17 PROCEDURE — 3078F PR MOST RECENT DIASTOLIC BLOOD PRESSURE < 80 MM HG: ICD-10-PCS | Mod: CPTII,S$GLB,, | Performed by: NURSE PRACTITIONER

## 2022-03-17 PROCEDURE — 1159F MED LIST DOCD IN RCRD: CPT | Mod: CPTII,S$GLB,, | Performed by: NURSE PRACTITIONER

## 2022-03-17 PROCEDURE — 3078F DIAST BP <80 MM HG: CPT | Mod: CPTII,S$GLB,, | Performed by: NURSE PRACTITIONER

## 2022-03-17 PROCEDURE — 85025 COMPLETE CBC W/AUTO DIFF WBC: CPT | Performed by: INTERNAL MEDICINE

## 2022-03-17 PROCEDURE — 99215 PR OFFICE/OUTPT VISIT, EST, LEVL V, 40-54 MIN: ICD-10-PCS | Mod: S$GLB,,, | Performed by: NURSE PRACTITIONER

## 2022-03-17 PROCEDURE — 3075F PR MOST RECENT SYSTOLIC BLOOD PRESS GE 130-139MM HG: ICD-10-PCS | Mod: CPTII,S$GLB,, | Performed by: NURSE PRACTITIONER

## 2022-03-17 PROCEDURE — 3008F PR BODY MASS INDEX (BMI) DOCUMENTED: ICD-10-PCS | Mod: CPTII,S$GLB,, | Performed by: NURSE PRACTITIONER

## 2022-03-17 RX ORDER — SODIUM CHLORIDE 9 MG/ML
INJECTION, SOLUTION INTRAVENOUS CONTINUOUS
Status: CANCELLED | OUTPATIENT
Start: 2022-03-18

## 2022-03-17 RX ORDER — SODIUM CHLORIDE 9 MG/ML
INJECTION, SOLUTION INTRAVENOUS CONTINUOUS
Status: CANCELLED | OUTPATIENT
Start: 2022-04-01

## 2022-03-17 RX ORDER — SODIUM CHLORIDE 0.9 % (FLUSH) 0.9 %
10 SYRINGE (ML) INJECTION
Status: CANCELLED | OUTPATIENT
Start: 2022-03-18

## 2022-03-17 RX ORDER — HEPARIN 100 UNIT/ML
500 SYRINGE INTRAVENOUS
Status: CANCELLED | OUTPATIENT
Start: 2022-03-18

## 2022-03-17 RX ORDER — SODIUM CHLORIDE 0.9 % (FLUSH) 0.9 %
10 SYRINGE (ML) INJECTION
Status: CANCELLED | OUTPATIENT
Start: 2022-04-01

## 2022-03-17 RX ORDER — HEPARIN 100 UNIT/ML
500 SYRINGE INTRAVENOUS
Status: CANCELLED | OUTPATIENT
Start: 2022-03-25

## 2022-03-17 RX ORDER — HEPARIN 100 UNIT/ML
500 SYRINGE INTRAVENOUS
Status: CANCELLED | OUTPATIENT
Start: 2022-04-01

## 2022-03-17 RX ORDER — SODIUM CHLORIDE 0.9 % (FLUSH) 0.9 %
10 SYRINGE (ML) INJECTION
Status: CANCELLED | OUTPATIENT
Start: 2022-03-25

## 2022-03-17 RX ORDER — SODIUM CHLORIDE 9 MG/ML
INJECTION, SOLUTION INTRAVENOUS CONTINUOUS
Status: CANCELLED | OUTPATIENT
Start: 2022-03-25

## 2022-03-18 ENCOUNTER — INFUSION (OUTPATIENT)
Dept: INFUSION THERAPY | Facility: HOSPITAL | Age: 47
End: 2022-03-18
Payer: MEDICARE

## 2022-03-18 VITALS
SYSTOLIC BLOOD PRESSURE: 149 MMHG | HEART RATE: 76 BPM | DIASTOLIC BLOOD PRESSURE: 70 MMHG | BODY MASS INDEX: 41.75 KG/M2 | RESPIRATION RATE: 18 BRPM | HEIGHT: 73 IN | TEMPERATURE: 98 F | WEIGHT: 315 LBS

## 2022-03-18 DIAGNOSIS — C50.021 MALIGNANT NEOPLASM INVOLVING BOTH NIPPLE AND AREOLA OF RIGHT BREAST IN MALE, ESTROGEN RECEPTOR NEGATIVE: Primary | ICD-10-CM

## 2022-03-18 DIAGNOSIS — Z17.1 MALIGNANT NEOPLASM INVOLVING BOTH NIPPLE AND AREOLA OF RIGHT BREAST IN MALE, ESTROGEN RECEPTOR NEGATIVE: Primary | ICD-10-CM

## 2022-03-18 PROCEDURE — 63600175 PHARM REV CODE 636 W HCPCS: Performed by: INTERNAL MEDICINE

## 2022-03-18 PROCEDURE — 96413 CHEMO IV INFUSION 1 HR: CPT

## 2022-03-18 PROCEDURE — 25000003 PHARM REV CODE 250: Performed by: INTERNAL MEDICINE

## 2022-03-18 PROCEDURE — A4216 STERILE WATER/SALINE, 10 ML: HCPCS | Performed by: INTERNAL MEDICINE

## 2022-03-18 PROCEDURE — 96361 HYDRATE IV INFUSION ADD-ON: CPT

## 2022-03-18 RX ORDER — SODIUM CHLORIDE 0.9 % (FLUSH) 0.9 %
10 SYRINGE (ML) INJECTION
Status: DISCONTINUED | OUTPATIENT
Start: 2022-03-18 | End: 2022-03-18 | Stop reason: HOSPADM

## 2022-03-18 RX ORDER — HEPARIN 100 UNIT/ML
500 SYRINGE INTRAVENOUS
Status: DISCONTINUED | OUTPATIENT
Start: 2022-03-18 | End: 2022-03-18 | Stop reason: HOSPADM

## 2022-03-18 RX ORDER — SODIUM CHLORIDE 9 MG/ML
INJECTION, SOLUTION INTRAVENOUS CONTINUOUS
Status: DISCONTINUED | OUTPATIENT
Start: 2022-03-18 | End: 2022-03-18 | Stop reason: HOSPADM

## 2022-03-18 RX ADMIN — Medication 10 ML: at 01:03

## 2022-03-18 RX ADMIN — SODIUM CHLORIDE: 9 INJECTION, SOLUTION INTRAVENOUS at 11:03

## 2022-03-18 RX ADMIN — PACLITAXEL 300 MG: 100 INJECTION, POWDER, LYOPHILIZED, FOR SUSPENSION INTRAVENOUS at 12:03

## 2022-03-18 RX ADMIN — HEPARIN 500 UNITS: 100 SYRINGE at 01:03

## 2022-03-18 NOTE — PLAN OF CARE
Pt tolerated Abraxane/ IVFs (500ml)  today. NAD. Port flushed + blood return present, flushed. Hep lock and deaccesed. declined AVS. Uses my [facility]. Discharged home. Ambulated independently.   Problem: Adult Inpatient Plan of Care  Goal: Optimal Comfort and Wellbeing  Intervention: Provide Person-Centered Care  Flowsheets (Taken 3/18/2022 [id number])  Trust Relationship/Rapport:   care explained   thoughts/feelings acknowledged   choices provided   emotional support provided   empathic listening provided   reassurance provided   questions encouraged   questions answered

## 2022-03-24 ENCOUNTER — PATIENT MESSAGE (OUTPATIENT)
Dept: HEMATOLOGY/ONCOLOGY | Facility: CLINIC | Age: 47
End: 2022-03-24
Payer: MEDICARE

## 2022-03-25 ENCOUNTER — PATIENT MESSAGE (OUTPATIENT)
Dept: HEMATOLOGY/ONCOLOGY | Facility: CLINIC | Age: 47
End: 2022-03-25

## 2022-03-25 ENCOUNTER — INFUSION (OUTPATIENT)
Dept: INFUSION THERAPY | Facility: HOSPITAL | Age: 47
End: 2022-03-25
Attending: INTERNAL MEDICINE
Payer: MEDICARE

## 2022-03-25 ENCOUNTER — OFFICE VISIT (OUTPATIENT)
Dept: HEMATOLOGY/ONCOLOGY | Facility: CLINIC | Age: 47
End: 2022-03-25
Payer: MEDICARE

## 2022-03-25 VITALS
SYSTOLIC BLOOD PRESSURE: 138 MMHG | TEMPERATURE: 99 F | HEART RATE: 77 BPM | BODY MASS INDEX: 40.43 KG/M2 | OXYGEN SATURATION: 99 % | DIASTOLIC BLOOD PRESSURE: 66 MMHG | WEIGHT: 315 LBS | RESPIRATION RATE: 18 BRPM | HEIGHT: 74 IN

## 2022-03-25 DIAGNOSIS — I10 ESSENTIAL HYPERTENSION: ICD-10-CM

## 2022-03-25 DIAGNOSIS — C50.021 MALIGNANT NEOPLASM INVOLVING BOTH NIPPLE AND AREOLA OF RIGHT BREAST IN MALE, ESTROGEN RECEPTOR NEGATIVE: Primary | ICD-10-CM

## 2022-03-25 DIAGNOSIS — Z17.1 MALIGNANT NEOPLASM INVOLVING BOTH NIPPLE AND AREOLA OF RIGHT BREAST IN MALE, ESTROGEN RECEPTOR NEGATIVE: Primary | ICD-10-CM

## 2022-03-25 DIAGNOSIS — D70.1 LEUKOPENIA DUE TO ANTINEOPLASTIC CHEMOTHERAPY: ICD-10-CM

## 2022-03-25 DIAGNOSIS — T45.1X5A LEUKOPENIA DUE TO ANTINEOPLASTIC CHEMOTHERAPY: ICD-10-CM

## 2022-03-25 DIAGNOSIS — T45.1X5A ANEMIA ASSOCIATED WITH CHEMOTHERAPY: ICD-10-CM

## 2022-03-25 DIAGNOSIS — Z51.11 CHEMOTHERAPY MANAGEMENT, ENCOUNTER FOR: ICD-10-CM

## 2022-03-25 DIAGNOSIS — C79.51 BONE METASTASES: ICD-10-CM

## 2022-03-25 DIAGNOSIS — G62.9 NEUROPATHY: ICD-10-CM

## 2022-03-25 DIAGNOSIS — E11.9 TYPE 2 DIABETES MELLITUS WITHOUT COMPLICATION, WITHOUT LONG-TERM CURRENT USE OF INSULIN: ICD-10-CM

## 2022-03-25 DIAGNOSIS — D64.81 ANEMIA ASSOCIATED WITH CHEMOTHERAPY: ICD-10-CM

## 2022-03-25 PROCEDURE — 96413 CHEMO IV INFUSION 1 HR: CPT

## 2022-03-25 PROCEDURE — A4216 STERILE WATER/SALINE, 10 ML: HCPCS | Performed by: INTERNAL MEDICINE

## 2022-03-25 PROCEDURE — 63600175 PHARM REV CODE 636 W HCPCS: Mod: JG | Performed by: INTERNAL MEDICINE

## 2022-03-25 PROCEDURE — 99999 PR PBB SHADOW E&M-EST. PATIENT-LVL II: CPT | Mod: PBBFAC,,, | Performed by: INTERNAL MEDICINE

## 2022-03-25 PROCEDURE — 4010F PR ACE/ARB THEARPY RXD/TAKEN: ICD-10-PCS | Mod: CPTII,S$GLB,, | Performed by: INTERNAL MEDICINE

## 2022-03-25 PROCEDURE — 96361 HYDRATE IV INFUSION ADD-ON: CPT

## 2022-03-25 PROCEDURE — 99214 OFFICE O/P EST MOD 30 MIN: CPT | Mod: S$GLB,,, | Performed by: INTERNAL MEDICINE

## 2022-03-25 PROCEDURE — 99999 PR PBB SHADOW E&M-EST. PATIENT-LVL II: ICD-10-PCS | Mod: PBBFAC,,, | Performed by: INTERNAL MEDICINE

## 2022-03-25 PROCEDURE — 99214 PR OFFICE/OUTPT VISIT, EST, LEVL IV, 30-39 MIN: ICD-10-PCS | Mod: S$GLB,,, | Performed by: INTERNAL MEDICINE

## 2022-03-25 PROCEDURE — 96375 TX/PRO/DX INJ NEW DRUG ADDON: CPT

## 2022-03-25 PROCEDURE — 4010F ACE/ARB THERAPY RXD/TAKEN: CPT | Mod: CPTII,S$GLB,, | Performed by: INTERNAL MEDICINE

## 2022-03-25 PROCEDURE — 25000003 PHARM REV CODE 250: Performed by: INTERNAL MEDICINE

## 2022-03-25 RX ORDER — SODIUM CHLORIDE 0.9 % (FLUSH) 0.9 %
10 SYRINGE (ML) INJECTION
Status: DISCONTINUED | OUTPATIENT
Start: 2022-03-25 | End: 2022-03-25 | Stop reason: HOSPADM

## 2022-03-25 RX ORDER — SODIUM CHLORIDE 9 MG/ML
INJECTION, SOLUTION INTRAVENOUS CONTINUOUS
Status: DISCONTINUED | OUTPATIENT
Start: 2022-03-25 | End: 2022-03-25 | Stop reason: HOSPADM

## 2022-03-25 RX ORDER — HEPARIN 100 UNIT/ML
500 SYRINGE INTRAVENOUS
Status: CANCELLED | OUTPATIENT
Start: 2022-04-22

## 2022-03-25 RX ORDER — HEPARIN 100 UNIT/ML
500 SYRINGE INTRAVENOUS
Status: DISCONTINUED | OUTPATIENT
Start: 2022-03-25 | End: 2022-03-25 | Stop reason: HOSPADM

## 2022-03-25 RX ORDER — ZOLEDRONIC ACID 0.04 MG/ML
4 INJECTION, SOLUTION INTRAVENOUS
Status: COMPLETED | OUTPATIENT
Start: 2022-03-25 | End: 2022-03-25

## 2022-03-25 RX ORDER — ZOLEDRONIC ACID 0.04 MG/ML
4 INJECTION, SOLUTION INTRAVENOUS
Status: CANCELLED | OUTPATIENT
Start: 2022-04-22

## 2022-03-25 RX ORDER — SODIUM CHLORIDE 0.9 % (FLUSH) 0.9 %
10 SYRINGE (ML) INJECTION
Status: CANCELLED | OUTPATIENT
Start: 2022-04-22

## 2022-03-25 RX ADMIN — Medication 10 ML: at 01:03

## 2022-03-25 RX ADMIN — SODIUM CHLORIDE: 0.9 INJECTION, SOLUTION INTRAVENOUS at 10:03

## 2022-03-25 RX ADMIN — ZOLEDRONIC ACID 4 MG: 0.04 INJECTION, SOLUTION INTRAVENOUS at 11:03

## 2022-03-25 RX ADMIN — PACLITAXEL 240 MG: 100 INJECTION, POWDER, LYOPHILIZED, FOR SUSPENSION INTRAVENOUS at 11:03

## 2022-03-25 RX ADMIN — HEPARIN 500 UNITS: 100 SYRINGE at 01:03

## 2022-03-25 NOTE — NURSING
Tolerated treatment well.  Advised to call MD for any problems or concerns.  AVS given.  RTC on 4/1/22. Needs lab appt scheduled.  PAC flushed and de-accessed.  Discharged home stable.

## 2022-03-25 NOTE — PLAN OF CARE
Arrived ambulatory for c8d8 Abraxane with IVF's.  No new complaints reported.  Labs noted. POC reviewed with pt in agreement.  PAC accessed with brisk blood return noted.  IVF's and Tx given as ordered and tolerated well,.  PAC flushed and de-accessed upon completion.  Discharged home stable.

## 2022-03-25 NOTE — PROGRESS NOTES
Subjective:       Patient ID: Paul Li Jr. is a 46 y.o. male.    Chief Complaint: No chief complaint on file.    HPI     Returns for cycle 8. Day 8 (Abraxane and PO Aplelisib)  Reports doing well  Denies pain  Fatigue but only because he stayed up late  Notes more neuropathy  Relieved when walking  Worse when at rest  Able to feel, more of a tingling (had prior with Taxane)  Still on Gabapentin 3 x per day    Reports exercising more now- walks 4-6 days per week  Notes improved blood glucose control  Less joint pain     Most recent Imagin/15/2022 PET Scan:  FINDINGS:  Quality of the study: Adequate.  In the head and neck, there are no hypermetabolic lesions worrisome for malignancy. There are no hypermetabolic mucosal lesions, and there are no pathologically enlarged or hypermetabolic lymph nodes.  In the chest, there are no hypermetabolic lesions worrisome for malignancy.  There are no pathologically enlarged or hypermetabolic lymph nodes.  Stable right lung pulmonary micronodules measuring 0.4 cm (series 2, image 83), below the size threshold for PET. No new pulmonary nodule or mass.  Postoperative changes of right mastectomy and axillary dissection.  In the abdomen and pelvis, there is physiologic tracer distribution within the abdominal organs and excretion into the genitourinary system.  In the bones, there is normalized uptake within prior hypermetabolic osseous lesions which demonstrate progressed sclerosis on CT.  Prominent sclerosis involving the T12 vertebral body, sacrum, and iliac bone.  Mild uptake within the left pubic symphysis without correlate lesion on CT, potentially representing physiologic marrow uptake.  In the extremities, there are no hypermetabolic lesions worrisome for malignancy.  Incidental CT findings: Sinus disease.  Left-sided chest port with catheter tip terminating in the SVC.  Diffuse hypoattenuation of the liver compatible with hepatic steatosis.  Impression:  Favorable  response to therapy with normalized uptake and increased sclerosis of osseous lesions.  No new hypermetabolic tumor.  Stable right lung pulmonary micronodules, below the size threshold for PET.  Attention on follow-up.     Returns for follow up and C7D8 Abraxane and Aplelisib.  Eating well, drinking well  Pain with growth factor injections, no other pain and this resolved  Energy level is good  No fevers, chills, or infectious complaints     Diagnosis:  Metastatic TNBC progressed (prior Stage IB (P2dJ1G0) triple negative invasive ductal carcinoma with lobular features of right breast, retroareolar, Grade 2, Ki67 80%, diagnosed 2019)     Oncology History:  Metastatic  Set up for scans (due to back pain) which are consistent for recurrence.   Imagin/3/2021 MRI T/L spine:  FINDINGS:  Alignment: Within normal limits.  Vertebrae: Low T1 signal intensity in the left T12 vertebral body extending to the left pedicle, S1 and S2 vertebral bodies with abnormal enhancement.  The vertebral heights are well maintained.  No evidence of acute fractures.  Abnormal appearance of the LEFT sacrum and ilium as well.  Discs: Degenerative disease of the level of L4-L5 with posterior annular fissure.  Conus appears within normal limits terminating at the level of the L2. level.  Cauda equina appears unremarkable.  Mild circumferential disc bulging at the level of T10-T11 abutting the ventral thecal sac.  No significant spinal canal stenosis or neural foraminal narrowing throughout the thoracic spine.  No significant spinal canal stenosis or neural foraminal narrowing throughout the lumbar spine.  Paraspinous muscles and soft tissues: Subcentimeter focal enhancement in the posterior column along the supraspinous ligament at the level of L1-L2 (sagittal series 21, image 10) potentially inflammatory versus neoplastic.  Impression:  Abnormal appearance of the T12, S1, S2 vertebral bodies as well as LEFT sacrum and ilium concerning for  metastatic disease in this patient with history of breast cancer.  No evidence for significant central canal narrowing.  Additional findings, as above.  This report was flagged in Epic as abnormal.     6/9/2021 PET scan:  COMPARISON:  MRI thoracic and lumbar spine 06/03/2021  FINDINGS:  Quality of the study: Adequate.  In the head and neck, there are no hypermetabolic lesions worrisome for malignancy. There are no hypermetabolic mucosal lesions, and there are no pathologically enlarged or hypermetabolic lymph nodes.  In the chest, there is postoperative change of right mastectomy/right axillary lymph node dissection.  There is low level hypermetabolic activity with mild soft tissue thickening in the skin/superficial subcutaneous fat of the right chest wall (axial fused image 78) with SUV max 3.6.  There is soft tissue thickening measuring approximately 1.8 x 7.9 cm in the subcutaneous fat of the right chest wall (axial series 3, image 84) with SUV max 3.1.  There are a few bilateral pulmonary nodules measuring up to 0.3 cm in the left lung (axial series 3, image 88) and 0.5 cm in the right lung (axial series 3, image 84).  These nodules are too small to characterize by PET.  There are no pathologically enlarged or hypermetabolic lymph nodes.  In the abdomen and pelvis, there is physiologic tracer distribution within the abdominal organs and excretion into the genitourinary system.  Subtle sen mesentery and multiple prominent mesenteric lymph nodes measuring up to 1.8 cm in long axis with background activity.  In the bones, there are several hypermetabolic lesions worrisome for malignancy.  Sclerotic lesion in the left T12 vertebral body (axial series 3, image 131) with SUV max 12.  Sclerotic lesions in the sacrum (for example axial series 3, image 188) with SUV max 9.7.  Sclerotic lesions in the left iliac bone (for example axial series 3, image 205) with SUV max 6.  In the extremities, there are no hypermetabolic  lesions worrisome for malignancy.  Incidental findings:  Bilateral maxillary antra mucous retention cysts.  Fat containing right inguinal hernia.  Impression:  1. In this patient with right breast carcinoma status post mastectomy/right axillary lymph node dissection, there are multiple sclerotic lesions in the T12 vertebral body, sacrum, and left iliac bone with hypermetabolic activity concerning for active metastatic disease and in keeping with findings on MRI 06/03/2021.  2. Additionally there is low-level hypermetabolic activity with soft tissue thickening in the right chest wall as detailed above.  These findings are nonspecific and may be related to postoperative/post radiation change, with recurrent malignancy not excluded.  3. Nonspecific mesenteric haziness and lymph nodes which may indicate mesenteric adenitis.  Recommend clinical correlation and attention on follow-up.  4. Additional findings as above.  I, Sohan Tillman MD, attest that I reviewed and interpreted the images.     In summary,   Started aplelisib 8/22/2021   Abraxane C1 started 8/13/2021  We had sent Guardant and discussed results with Molecular tumor board  He also had a repeat bx to confirm metastatic triple negative breast cancer  Plan:   Nab-Paclitaxel and Alpelisib  Reference: https://ascopubs.org/doi/abs/10.1200/JCO.[email or web address]_suppl.1018  Also on Zometa-      - C1D1 Abraxane 8/13/2021  - Started aplelisib 8/22/2021               Oncology History   Malignant neoplasm involving both nipple and areola of right breast in male, estrogen receptor negative   5/1/2019 Imaging Significant Findings     Mammogram and US  Impression:  Right  Mass: Right breast 14 mm mass at the retroareolar anterior position. Assessment: 4 - Suspicious finding. Biopsy is recommended.   Left  There is no mammographic or sonographic evidence of malignancy.  Recommendation:  Biopsy is recommended.      5/2/2019 Biopsy     BREAST, RIGHT, RETROAREOLAR MASS,  ULTRASOUND-GUIDED BIOPSY:  Invasive ductal carcinoma with lobular features.  Size of invasive carcinoma: 5 MM in greatest linear dimension within a single      5/2/2019 Breast Tumor Markers     Estrogen: Negative  Progesterone: Negative  HER2: Negative  Ki67: 80      5/17/2019 Cancer Staged     Staging form: Breast, AJCC 8th Edition  - Clinical stage from 5/17/2019: Stage IB (cT1c, cN0, cM0, G2, ER-, MT-, HER2-) - Signed by Richa Bahena MD on 5/17/2019 5/27/2019 - 7/21/2019 Chemotherapy     Treatment Summary   Plan Name: OP BREAST DOSE-DENSE AC - DOXORUBICIN CYCLOPHOSPHAMIDE Q2W  Treatment Goal: Curative  Status: Inactive  Start Date: 5/27/2019  End Date: 7/9/2019  Provider: Richa Bahena MD  Chemotherapy: DOXOrubicin chemo injection 186 mg, 60 mg/m2 = 186 mg, Intravenous, Clinic/HOD 1 time, 4 of 4 cycles  Administration: 186 mg (5/27/2019), 186 mg (6/10/2019), 186 mg (6/24/2019), 186 mg (7/8/2019)  cyclophosphamide (CYTOXAN) 600 mg/m2 = 1,865 mg in sodium chloride 0.9% 250 mL chemo infusion, 600 mg/m2 = 1,865 mg, Intravenous, Clinic/HOD 1 time, 4 of 4 cycles  Administration: 1,865 mg (5/27/2019), 1,865 mg (6/10/2019), 1,865 mg (6/24/2019), 1,865 mg (7/8/2019)      7/22/2019 - 10/15/2019 Chemotherapy     Treatment Summary   Plan Name: OP PACLITAXEL QW  Treatment Goal: Curative  Status: Inactive  Start Date: 7/24/2019  End Date: 10/8/2019  Provider: Richa Bahena MD  Chemotherapy: PACLitaxel (TAXOL) 80 mg/m2 = 246 mg in sodium chloride 0.9% 250 mL chemo infusion, 80 mg/m2 = 246 mg, Intravenous, Clinic/HOD 1 time, 12 of 12 cycles  Dose modification: 60 mg/m2 (original dose 80 mg/m2, Cycle 3), 55 mg/m2 (original dose 80 mg/m2, Cycle 7), 60 mg/m2 (original dose 80 mg/m2, Cycle 7), 50 mg/m2 (original dose 80 mg/m2, Cycle 9), 50 mg/m2 (original dose 80 mg/m2, Cycle 10)  Administration: 246 mg (7/24/2019), 246 mg (7/31/2019), 186 mg (8/7/2019), 186 mg (8/14/2019), 186 mg (8/21/2019), 186 mg (8/28/2019),  186 mg (9/4/2019), 174 mg (9/11/2019), 156 mg (9/18/2019), 156 mg (9/25/2019), 156 mg (10/1/2019), 156 mg (10/8/2019)      11/22/2019 Breast Surgery     On 11/22/19 Dr whyte performed a right breast mastectomy with SLN biopsy with pathology showing grade 2, 6 mm IDC with extensive treatment effect, no LVI with 1 LN positive 4 mm, negative margins. The on 12/17/19, Dr Whyte performed right axillary dissection with pathology still pending.      11/22/2019 Cancer Staged     ypT1b N1      12/17/2019 Breast Surgery     Surgery: Dr Shima Whyte, [phone] performed right ALND with pathology showing 14 negative lymph nodes.             1/14/2020 Tumor Conference      Post mastectomy radiation therapy: Xeloda, then possible Keytruda trial .      2/3/2020 - 3/23/2020 Radiation Therapy     Treating physician: Speedy Nair MD   Total Dose: 50.4 Gy to the chest wall and regional lymphatics  10 Gy boost to the prostate.   Fractions: 28 fractions at 1.8 Gy per fraction  5 fractions at 2 Gy per fraction       2/28/2020 -  Chemotherapy     * 4/15/2020 - 9/28/20 completed 6 cycles adjuvant Xeloda 1000 mg/m2 bid days 1-14 every 21 days  Treatment Summary   Plan Name: OP CAPECITABINE 1250MG/M2 BID Q3W  Treatment Goal: Curative  Status: Active  Start Date: 3/20/2020  End Date: 3/20/2020  Provider: Richa Bahena MD  Chemotherapy: [No matching medication found in this treatment plan]        Review of Systems   Constitutional: Negative for activity change, appetite change, chills, fatigue, fever and unexpected weight change.   HENT: Negative for dental problem, postnasal drip, rhinorrhea, sinus pressure/congestion, sore throat, tinnitus, trouble swallowing and voice change.    Eyes: Negative for visual disturbance.   Respiratory: Negative for cough, shortness of breath and wheezing.    Cardiovascular: Negative for chest pain, palpitations and leg swelling.   Gastrointestinal: Negative for abdominal distention, abdominal  pain, blood in stool, constipation, diarrhea, nausea and vomiting.   Endocrine: Negative for cold intolerance and heat intolerance.   Genitourinary: Negative for decreased urine volume, difficulty urinating, dysuria, frequency, hematuria and urgency.   Musculoskeletal: Negative for arthralgias, back pain, gait problem, joint swelling, myalgias, neck pain and neck stiffness.   Integumentary:  Negative for color change, pallor, rash and wound.   Neurological: Negative for dizziness, weakness, light-headedness, numbness and headaches.   Hematological: Negative for adenopathy. Does not bruise/bleed easily.   Psychiatric/Behavioral: Negative for confusion, dysphoric mood and sleep disturbance. The patient is not nervous/anxious.          Objective:      Physical Exam  Vitals and nursing note reviewed.   Constitutional:       General: He is not in acute distress.     Appearance: Normal appearance. He is well-developed. He is obese. He is not diaphoretic.      Comments: Presents alone  Pleasant  ECOG= 0   HENT:      Head: Normocephalic and atraumatic.   Eyes:      General: No scleral icterus.     Extraocular Movements: Extraocular movements intact.      Conjunctiva/sclera: Conjunctivae normal.      Pupils: Pupils are equal, round, and reactive to light.   Neck:      Thyroid: No thyromegaly.      Trachea: No tracheal deviation.   Cardiovascular:      Rate and Rhythm: Normal rate and regular rhythm.      Heart sounds: Normal heart sounds. No murmur heard.    No friction rub. No gallop.   Pulmonary:      Effort: Pulmonary effort is normal. No respiratory distress.      Breath sounds: Normal breath sounds. No wheezing, rhonchi or rales.   Chest:      Chest wall: No tenderness.   Abdominal:      General: Bowel sounds are normal. There is no distension.      Palpations: Abdomen is soft. There is no mass.      Tenderness: There is no abdominal tenderness. There is no guarding or rebound.      Comments: No organomegaly    Musculoskeletal:         General: No swelling, tenderness or deformity. Normal range of motion.      Cervical back: Normal range of motion and neck supple. No rigidity or tenderness.      Right lower leg: Edema (trace, chronic) present.      Left lower leg: Edema (trace, chronic) present.      Comments: Left > right edema LE   Lymphadenopathy:      Cervical: No cervical adenopathy.   Skin:     General: Skin is warm and dry.      Coloration: Skin is not jaundiced or pale.      Findings: No erythema or rash.   Neurological:      General: No focal deficit present.      Mental Status: He is alert and oriented to person, place, and time. Mental status is at baseline.      Cranial Nerves: No cranial nerve deficit.      Sensory: No sensory deficit.      Motor: No weakness or abnormal muscle tone.      Coordination: Coordination normal.      Gait: Gait normal.      Deep Tendon Reflexes: Reflexes are normal and symmetric. Reflexes normal.   Psychiatric:         Mood and Affect: Mood normal.         Behavior: Behavior normal.         Thought Content: Thought content normal.         Judgment: Judgment normal.       Labs- reviewed  Assessment:       Problem List Items Addressed This Visit     Type 2 diabetes mellitus without complication    Neuropathy    Malignant neoplasm involving both nipple and areola of right breast in male, estrogen receptor negative - Primary    Relevant Orders    CBC W/ AUTO DIFFERENTIAL    CMP    CBC W/ AUTO DIFFERENTIAL    CMP    Leukopenia due to antineoplastic chemotherapy    Essential hypertension    Chemotherapy management, encounter for    Bone metastases    Anemia associated with chemotherapy          Plan:       Type 2 DM- improved control    Neuropathy- dose reductions made  Continue Gabapentin  Acupuncture referral    Metastatic breast cancer- continued response proceed with chemo  Continue Abraxane and Aplelisib.  Next scans post cycle 9  Tolerating well  ANC adequate  Monitor H/H-  additional labs pending    HTN- better control    Knows to call for any issues    PT referral for edema    Route Chart for Scheduling    Med Onc Chart Routing      Follow up with physician 1 week. Labs prior and chemo- can overbook   Follow up with [facility] 3 weeks. Labs prior and chemo   Labs CBC and CMP   Lab interval:     Imaging    Pharmacy appointment    Other referrals Additional referrals needed         Treatment Plan Information   OP BREAST NAB-PACLITAXEL D1, D8, D15 + ALPELISIB    Rosa Linn MD   Upcoming Treatment Dates - OP BREAST NAB-PACLITAXEL D1, D8, D15 + ALPELISIB     3/25/2022       Chemotherapy       PACLitaxel-protein bound (ABRAXANE) 80 mg/m2 = 240 mg in 48 mL infusion  4/1/2022       Chemotherapy       PACLitaxel-protein bound (ABRAXANE) 80 mg/m2 = 240 mg in 48 mL infusion    Supportive Plan Information  OP FILGRASTIM 480 MCG   Michelle Grayson NP   Upcoming Treatment Dates - OP FILGRASTIM 480 MCG    No upcoming days in selected categories.    Therapy Plan Information  PORT FLUSH  Flushes  heparin, porcine (PF) 100 unit/mL injection flush 500 Units  500 Units, Intravenous, Every visit  sodium chloride 0.9% flush 10 mL  10 mL, Intravenous, Every visit    ZOLEDRONIC ACID (ZOMETA) IV  Medications  zoledronic 4 mg/100 mL infusion 4 mg  4 mg, Intravenous, Every 4 weeks  Flushes  sodium chloride 0.9% 250 mL flush bag  Intravenous, Every 4 weeks  sodium chloride 0.9% flush 10 mL  10 mL, Intravenous, Every 4 weeks  heparin, porcine (PF) 100 unit/mL injection flush 500 Units  500 Units, Intravenous, Every 4 weeks  alteplase injection 2 mg  2 mg, Intra-Catheter, Every 4 weeks

## 2022-03-28 ENCOUNTER — TELEPHONE (OUTPATIENT)
Dept: HEMATOLOGY/ONCOLOGY | Facility: CLINIC | Age: 47
End: 2022-03-28
Payer: MEDICARE

## 2022-03-29 ENCOUNTER — TELEPHONE (OUTPATIENT)
Dept: INFUSION THERAPY | Facility: HOSPITAL | Age: 47
End: 2022-03-29
Payer: MEDICARE

## 2022-03-29 ENCOUNTER — PATIENT MESSAGE (OUTPATIENT)
Dept: HEMATOLOGY/ONCOLOGY | Facility: CLINIC | Age: 47
End: 2022-03-29
Payer: MEDICARE

## 2022-03-31 ENCOUNTER — OFFICE VISIT (OUTPATIENT)
Dept: HEMATOLOGY/ONCOLOGY | Facility: CLINIC | Age: 47
End: 2022-03-31
Payer: MEDICARE

## 2022-03-31 ENCOUNTER — LAB VISIT (OUTPATIENT)
Dept: LAB | Facility: HOSPITAL | Age: 47
End: 2022-03-31
Attending: INTERNAL MEDICINE
Payer: MEDICARE

## 2022-03-31 VITALS
HEIGHT: 74 IN | SYSTOLIC BLOOD PRESSURE: 120 MMHG | DIASTOLIC BLOOD PRESSURE: 58 MMHG | OXYGEN SATURATION: 96 % | BODY MASS INDEX: 40.43 KG/M2 | HEART RATE: 68 BPM | TEMPERATURE: 98 F | WEIGHT: 315 LBS

## 2022-03-31 DIAGNOSIS — Z17.1 MALIGNANT NEOPLASM INVOLVING BOTH NIPPLE AND AREOLA OF RIGHT BREAST IN MALE, ESTROGEN RECEPTOR NEGATIVE: Primary | ICD-10-CM

## 2022-03-31 DIAGNOSIS — C79.51 BONE METASTASES: ICD-10-CM

## 2022-03-31 DIAGNOSIS — C50.021 MALIGNANT NEOPLASM INVOLVING BOTH NIPPLE AND AREOLA OF RIGHT BREAST IN MALE, ESTROGEN RECEPTOR NEGATIVE: ICD-10-CM

## 2022-03-31 DIAGNOSIS — G62.9 NEUROPATHY: ICD-10-CM

## 2022-03-31 DIAGNOSIS — Z17.1 MALIGNANT NEOPLASM INVOLVING BOTH NIPPLE AND AREOLA OF RIGHT BREAST IN MALE, ESTROGEN RECEPTOR NEGATIVE: ICD-10-CM

## 2022-03-31 DIAGNOSIS — C50.021 MALIGNANT NEOPLASM INVOLVING BOTH NIPPLE AND AREOLA OF RIGHT BREAST IN MALE, ESTROGEN RECEPTOR NEGATIVE: Primary | ICD-10-CM

## 2022-03-31 LAB
ALBUMIN SERPL BCP-MCNC: 4.1 G/DL (ref 3.5–5.2)
ALP SERPL-CCNC: 82 U/L (ref 55–135)
ALT SERPL W/O P-5'-P-CCNC: 22 U/L (ref 10–44)
ANION GAP SERPL CALC-SCNC: 8 MMOL/L (ref 8–16)
ANISOCYTOSIS BLD QL SMEAR: SLIGHT
AST SERPL-CCNC: 19 U/L (ref 10–40)
BASOPHILS # BLD AUTO: 0.02 K/UL (ref 0–0.2)
BASOPHILS NFR BLD: 0.6 % (ref 0–1.9)
BILIRUB SERPL-MCNC: 0.8 MG/DL (ref 0.1–1)
BUN SERPL-MCNC: 9 MG/DL (ref 6–20)
CALCIUM SERPL-MCNC: 9.9 MG/DL (ref 8.7–10.5)
CHLORIDE SERPL-SCNC: 102 MMOL/L (ref 95–110)
CO2 SERPL-SCNC: 28 MMOL/L (ref 23–29)
CREAT SERPL-MCNC: 1.2 MG/DL (ref 0.5–1.4)
DACRYOCYTES BLD QL SMEAR: ABNORMAL
DIFFERENTIAL METHOD: ABNORMAL
EOSINOPHIL # BLD AUTO: 0 K/UL (ref 0–0.5)
EOSINOPHIL NFR BLD: 0.3 % (ref 0–8)
ERYTHROCYTE [DISTWIDTH] IN BLOOD BY AUTOMATED COUNT: 16.2 % (ref 11.5–14.5)
EST. GFR  (AFRICAN AMERICAN): >60 ML/MIN/1.73 M^2
EST. GFR  (NON AFRICAN AMERICAN): >60 ML/MIN/1.73 M^2
GLUCOSE SERPL-MCNC: 114 MG/DL (ref 70–110)
HCT VFR BLD AUTO: 32.7 % (ref 40–54)
HGB BLD-MCNC: 10.8 G/DL (ref 14–18)
HYPOCHROMIA BLD QL SMEAR: ABNORMAL
IMM GRANULOCYTES # BLD AUTO: 0.01 K/UL (ref 0–0.04)
IMM GRANULOCYTES NFR BLD AUTO: 0.3 % (ref 0–0.5)
LYMPHOCYTES # BLD AUTO: 1.9 K/UL (ref 1–4.8)
LYMPHOCYTES NFR BLD: 60 % (ref 18–48)
MCH RBC QN AUTO: 26.2 PG (ref 27–31)
MCHC RBC AUTO-ENTMCNC: 33 G/DL (ref 32–36)
MCV RBC AUTO: 79 FL (ref 82–98)
MONOCYTES # BLD AUTO: 0.2 K/UL (ref 0.3–1)
MONOCYTES NFR BLD: 6.3 % (ref 4–15)
NEUTROPHILS # BLD AUTO: 1 K/UL (ref 1.8–7.7)
NEUTROPHILS NFR BLD: 32.5 % (ref 38–73)
NRBC BLD-RTO: 0 /100 WBC
OVALOCYTES BLD QL SMEAR: ABNORMAL
PLATELET # BLD AUTO: 244 K/UL (ref 150–450)
PLATELET BLD QL SMEAR: ABNORMAL
PMV BLD AUTO: 10.1 FL (ref 9.2–12.9)
POIKILOCYTOSIS BLD QL SMEAR: SLIGHT
POLYCHROMASIA BLD QL SMEAR: ABNORMAL
POTASSIUM SERPL-SCNC: 4.2 MMOL/L (ref 3.5–5.1)
PROT SERPL-MCNC: 7.8 G/DL (ref 6–8.4)
RBC # BLD AUTO: 4.12 M/UL (ref 4.6–6.2)
SODIUM SERPL-SCNC: 138 MMOL/L (ref 136–145)
WBC # BLD AUTO: 3.2 K/UL (ref 3.9–12.7)

## 2022-03-31 PROCEDURE — 3008F PR BODY MASS INDEX (BMI) DOCUMENTED: ICD-10-PCS | Mod: CPTII,S$GLB,, | Performed by: INTERNAL MEDICINE

## 2022-03-31 PROCEDURE — 3078F DIAST BP <80 MM HG: CPT | Mod: CPTII,S$GLB,, | Performed by: INTERNAL MEDICINE

## 2022-03-31 PROCEDURE — 4010F ACE/ARB THERAPY RXD/TAKEN: CPT | Mod: CPTII,S$GLB,, | Performed by: INTERNAL MEDICINE

## 2022-03-31 PROCEDURE — 80053 COMPREHEN METABOLIC PANEL: CPT | Performed by: INTERNAL MEDICINE

## 2022-03-31 PROCEDURE — 99999 PR PBB SHADOW E&M-EST. PATIENT-LVL IV: CPT | Mod: PBBFAC,,, | Performed by: INTERNAL MEDICINE

## 2022-03-31 PROCEDURE — 3074F SYST BP LT 130 MM HG: CPT | Mod: CPTII,S$GLB,, | Performed by: INTERNAL MEDICINE

## 2022-03-31 PROCEDURE — 3074F PR MOST RECENT SYSTOLIC BLOOD PRESSURE < 130 MM HG: ICD-10-PCS | Mod: CPTII,S$GLB,, | Performed by: INTERNAL MEDICINE

## 2022-03-31 PROCEDURE — 1160F RVW MEDS BY RX/DR IN RCRD: CPT | Mod: CPTII,S$GLB,, | Performed by: INTERNAL MEDICINE

## 2022-03-31 PROCEDURE — 1159F PR MEDICATION LIST DOCUMENTED IN MEDICAL RECORD: ICD-10-PCS | Mod: CPTII,S$GLB,, | Performed by: INTERNAL MEDICINE

## 2022-03-31 PROCEDURE — 3078F PR MOST RECENT DIASTOLIC BLOOD PRESSURE < 80 MM HG: ICD-10-PCS | Mod: CPTII,S$GLB,, | Performed by: INTERNAL MEDICINE

## 2022-03-31 PROCEDURE — 99214 OFFICE O/P EST MOD 30 MIN: CPT | Mod: S$GLB,,, | Performed by: INTERNAL MEDICINE

## 2022-03-31 PROCEDURE — 36415 COLL VENOUS BLD VENIPUNCTURE: CPT | Performed by: INTERNAL MEDICINE

## 2022-03-31 PROCEDURE — 1160F PR REVIEW ALL MEDS BY PRESCRIBER/CLIN PHARMACIST DOCUMENTED: ICD-10-PCS | Mod: CPTII,S$GLB,, | Performed by: INTERNAL MEDICINE

## 2022-03-31 PROCEDURE — 85025 COMPLETE CBC W/AUTO DIFF WBC: CPT | Performed by: INTERNAL MEDICINE

## 2022-03-31 PROCEDURE — 99999 PR PBB SHADOW E&M-EST. PATIENT-LVL IV: ICD-10-PCS | Mod: PBBFAC,,, | Performed by: INTERNAL MEDICINE

## 2022-03-31 PROCEDURE — 1159F MED LIST DOCD IN RCRD: CPT | Mod: CPTII,S$GLB,, | Performed by: INTERNAL MEDICINE

## 2022-03-31 PROCEDURE — 99214 PR OFFICE/OUTPT VISIT, EST, LEVL IV, 30-39 MIN: ICD-10-PCS | Mod: S$GLB,,, | Performed by: INTERNAL MEDICINE

## 2022-03-31 PROCEDURE — 4010F PR ACE/ARB THEARPY RXD/TAKEN: ICD-10-PCS | Mod: CPTII,S$GLB,, | Performed by: INTERNAL MEDICINE

## 2022-03-31 PROCEDURE — 3008F BODY MASS INDEX DOCD: CPT | Mod: CPTII,S$GLB,, | Performed by: INTERNAL MEDICINE

## 2022-03-31 NOTE — PROGRESS NOTES
Subjective:       Patient ID: Paul Li Jr. is a 46 y.o. male.    Chief Complaint: Malignant neoplasm involving both nipple and areola of righ    HPI     Returns for cycle 8. Day 15 (Abraxane and PO Aplelisib)  States doing well  Reports less stress   No pain issues  Chronic neuropathy-- better with 300 mg Neurontin at one dose in AM- advised to do bid  Feels more energy on Prozac  No fevers, chills, or infections  Reports exercising more now- walks 4-6 days per week  Notes improved blood glucose control  Less joint pain     Most recent Imagin/15/2022 PET Scan:  FINDINGS:  Quality of the study: Adequate.  In the head and neck, there are no hypermetabolic lesions worrisome for malignancy. There are no hypermetabolic mucosal lesions, and there are no pathologically enlarged or hypermetabolic lymph nodes.  In the chest, there are no hypermetabolic lesions worrisome for malignancy.  There are no pathologically enlarged or hypermetabolic lymph nodes.  Stable right lung pulmonary micronodules measuring 0.4 cm (series 2, image 83), below the size threshold for PET. No new pulmonary nodule or mass.  Postoperative changes of right mastectomy and axillary dissection.  In the abdomen and pelvis, there is physiologic tracer distribution within the abdominal organs and excretion into the genitourinary system.  In the bones, there is normalized uptake within prior hypermetabolic osseous lesions which demonstrate progressed sclerosis on CT.  Prominent sclerosis involving the T12 vertebral body, sacrum, and iliac bone.  Mild uptake within the left pubic symphysis without correlate lesion on CT, potentially representing physiologic marrow uptake.  In the extremities, there are no hypermetabolic lesions worrisome for malignancy.  Incidental CT findings: Sinus disease.  Left-sided chest port with catheter tip terminating in the SVC.  Diffuse hypoattenuation of the liver compatible with hepatic  steatosis.  Impression:  Favorable response to therapy with normalized uptake and increased sclerosis of osseous lesions.  No new hypermetabolic tumor.  Stable right lung pulmonary micronodules, below the size threshold for PET.  Attention on follow-up.     Diagnosis:  Metastatic TNBC progressed (prior Stage IB (L4oY6H3) triple negative invasive ductal carcinoma with lobular features of right breast, retroareolar, Grade 2, Ki67 80%, diagnosed 2019)     Oncology History:  Metastatic  Set up for scans (due to back pain) which are consistent for recurrence.   Imagin/3/2021 MRI T/L spine:  FINDINGS:  Alignment: Within normal limits.  Vertebrae: Low T1 signal intensity in the left T12 vertebral body extending to the left pedicle, S1 and S2 vertebral bodies with abnormal enhancement.  The vertebral heights are well maintained.  No evidence of acute fractures.  Abnormal appearance of the LEFT sacrum and ilium as well.  Discs: Degenerative disease of the level of L4-L5 with posterior annular fissure.  Conus appears within normal limits terminating at the level of the L2. level.  Cauda equina appears unremarkable.  Mild circumferential disc bulging at the level of T10-T11 abutting the ventral thecal sac.  No significant spinal canal stenosis or neural foraminal narrowing throughout the thoracic spine.  No significant spinal canal stenosis or neural foraminal narrowing throughout the lumbar spine.  Paraspinous muscles and soft tissues: Subcentimeter focal enhancement in the posterior column along the supraspinous ligament at the level of L1-L2 (sagittal series 21, image 10) potentially inflammatory versus neoplastic.  Impression:  Abnormal appearance of the T12, S1, S2 vertebral bodies as well as LEFT sacrum and ilium concerning for metastatic disease in this patient with history of breast cancer.  No evidence for significant central canal narrowing.  Additional findings, as above.  This report was flagged in Epic as  abnormal.     6/9/2021 PET scan:  COMPARISON:  MRI thoracic and lumbar spine 06/03/2021  FINDINGS:  Quality of the study: Adequate.  In the head and neck, there are no hypermetabolic lesions worrisome for malignancy. There are no hypermetabolic mucosal lesions, and there are no pathologically enlarged or hypermetabolic lymph nodes.  In the chest, there is postoperative change of right mastectomy/right axillary lymph node dissection.  There is low level hypermetabolic activity with mild soft tissue thickening in the skin/superficial subcutaneous fat of the right chest wall (axial fused image 78) with SUV max 3.6.  There is soft tissue thickening measuring approximately 1.8 x 7.9 cm in the subcutaneous fat of the right chest wall (axial series 3, image 84) with SUV max 3.1.  There are a few bilateral pulmonary nodules measuring up to 0.3 cm in the left lung (axial series 3, image 88) and 0.5 cm in the right lung (axial series 3, image 84).  These nodules are too small to characterize by PET.  There are no pathologically enlarged or hypermetabolic lymph nodes.  In the abdomen and pelvis, there is physiologic tracer distribution within the abdominal organs and excretion into the genitourinary system.  Subtle sen mesentery and multiple prominent mesenteric lymph nodes measuring up to 1.8 cm in long axis with background activity.  In the bones, there are several hypermetabolic lesions worrisome for malignancy.  Sclerotic lesion in the left T12 vertebral body (axial series 3, image 131) with SUV max 12.  Sclerotic lesions in the sacrum (for example axial series 3, image 188) with SUV max 9.7.  Sclerotic lesions in the left iliac bone (for example axial series 3, image 205) with SUV max 6.  In the extremities, there are no hypermetabolic lesions worrisome for malignancy.  Incidental findings:  Bilateral maxillary antra mucous retention cysts.  Fat containing right inguinal hernia.  Impression:  1. In this patient with  right breast carcinoma status post mastectomy/right axillary lymph node dissection, there are multiple sclerotic lesions in the T12 vertebral body, sacrum, and left iliac bone with hypermetabolic activity concerning for active metastatic disease and in keeping with findings on MRI 06/03/2021.  2. Additionally there is low-level hypermetabolic activity with soft tissue thickening in the right chest wall as detailed above.  These findings are nonspecific and may be related to postoperative/post radiation change, with recurrent malignancy not excluded.  3. Nonspecific mesenteric haziness and lymph nodes which may indicate mesenteric adenitis.  Recommend clinical correlation and attention on follow-up.  4. Additional findings as above.  I, Sohan Tillman MD, attest that I reviewed and interpreted the images.     In summary,   Started aplelisib 8/22/2021   Abraxane C1 started 8/13/2021  We had sent Guardant and discussed results with Molecular tumor board  He also had a repeat bx to confirm metastatic triple negative breast cancer  Plan:   Nab-Paclitaxel and Alpelisib  Reference: https://ascopubs.org/doi/abs/10.1200/JCO.[email or web address]_[email or web address]  Also on Zometa-      - C1D1 Abraxane 8/13/2021  - Started aplelisib 8/22/2021               Oncology History   Malignant neoplasm involving both nipple and areola of right breast in male, estrogen receptor negative   5/1/2019 Imaging Significant Findings     Mammogram and US  Impression:  Right  Mass: Right breast 14 mm mass at the retroareolar anterior position. Assessment: 4 - Suspicious finding. Biopsy is recommended.   Left  There is no mammographic or sonographic evidence of malignancy.  Recommendation:  Biopsy is recommended.      5/2/2019 Biopsy     BREAST, RIGHT, RETROAREOLAR MASS, ULTRASOUND-GUIDED BIOPSY:  Invasive ductal carcinoma with lobular features.  Size of invasive carcinoma: 5 MM in greatest linear dimension within a single      5/2/2019 Breast Tumor Markers      Estrogen: Negative  Progesterone: Negative  HER2: Negative  Ki67: 80      5/17/2019 Cancer Staged     Staging form: Breast, AJCC 8th Edition  - Clinical stage from 5/17/2019: Stage IB (cT1c, cN0, cM0, G2, ER-, ND-, HER2-) - Signed by Richa Bahena MD on 5/17/2019 5/27/2019 - 7/21/2019 Chemotherapy     Treatment Summary   Plan Name: OP BREAST DOSE-DENSE AC - DOXORUBICIN CYCLOPHOSPHAMIDE Q2W  Treatment Goal: Curative  Status: Inactive  Start Date: 5/27/2019  End Date: 7/9/2019  Provider: Richa Bahena MD  Chemotherapy: DOXOrubicin chemo injection 186 mg, 60 mg/m2 = 186 mg, Intravenous, Clinic/HOD 1 time, 4 of 4 cycles  Administration: 186 mg (5/27/2019), 186 mg (6/10/2019), 186 mg (6/24/2019), 186 mg (7/8/2019)  cyclophosphamide (CYTOXAN) 600 mg/m2 = 1,865 mg in sodium chloride 0.9% 250 mL chemo infusion, 600 mg/m2 = 1,865 mg, Intravenous, Clinic/HOD 1 time, 4 of 4 cycles  Administration: 1,865 mg (5/27/2019), 1,865 mg (6/10/2019), 1,865 mg (6/24/2019), 1,865 mg (7/8/2019)      7/22/2019 - 10/15/2019 Chemotherapy     Treatment Summary   Plan Name: OP PACLITAXEL QW  Treatment Goal: Curative  Status: Inactive  Start Date: 7/24/2019  End Date: 10/8/2019  Provider: Richa Bahena MD  Chemotherapy: PACLitaxel (TAXOL) 80 mg/m2 = 246 mg in sodium chloride 0.9% 250 mL chemo infusion, 80 mg/m2 = 246 mg, Intravenous, Clinic/HOD 1 time, 12 of 12 cycles  Dose modification: 60 mg/m2 (original dose 80 mg/m2, Cycle 3), 55 mg/m2 (original dose 80 mg/m2, Cycle 7), 60 mg/m2 (original dose 80 mg/m2, Cycle 7), 50 mg/m2 (original dose 80 mg/m2, Cycle 9), 50 mg/m2 (original dose 80 mg/m2, Cycle 10)  Administration: 246 mg (7/24/2019), 246 mg (7/31/2019), 186 mg (8/7/2019), 186 mg (8/14/2019), 186 mg (8/21/2019), 186 mg (8/28/2019), 186 mg (9/4/2019), 174 mg (9/11/2019), 156 mg (9/18/2019), 156 mg (9/25/2019), 156 mg (10/1/2019), 156 mg (10/8/2019)      11/22/2019 Breast Surgery     On 11/22/19 Dr milan performed a right  breast mastectomy with SLN biopsy with pathology showing grade 2, 6 mm IDC with extensive treatment effect, no LVI with 1 LN positive 4 mm, negative margins. The on 12/17/19, Dr Whyte performed right axillary dissection with pathology still pending.      11/22/2019 Cancer Staged     ypT1b N1      12/17/2019 Breast Surgery     Surgery: Dr Shima Whyte, [phone] performed right ALND with pathology showing 14 negative lymph nodes.             1/14/2020 Tumor Conference      Post mastectomy radiation therapy: Xeloda, then possible Keytruda trial .      2/3/2020 - 3/23/2020 Radiation Therapy     Treating physician: Speedy Nair MD   Total Dose: 50.4 Gy to the chest wall and regional lymphatics  10 Gy boost to the prostate.   Fractions: 28 fractions at 1.8 Gy per fraction  5 fractions at 2 Gy per fraction       2/28/2020 -  Chemotherapy     * 4/15/2020 - 9/28/20 completed 6 cycles adjuvant Xeloda 1000 mg/m2 bid days 1-14 every 21 days  Treatment Summary   Plan Name: OP CAPECITABINE 1250MG/M2 BID Q3W  Treatment Goal: Curative  Status: Active  Start Date: 3/20/2020  End Date: 3/20/2020  Provider: Richa Bahena MD  Chemotherapy: [No matching medication found in this treatment plan]        Review of Systems   Constitutional: Negative for activity change, appetite change, chills, fatigue, fever and unexpected weight change.   HENT: Negative for dental problem, postnasal drip, rhinorrhea, sinus pressure/congestion, sore throat, tinnitus, trouble swallowing and voice change.    Eyes: Negative for visual disturbance.   Respiratory: Negative for cough, shortness of breath and wheezing.    Cardiovascular: Negative for chest pain, palpitations and leg swelling.   Gastrointestinal: Negative for abdominal distention, abdominal pain, blood in stool, constipation, diarrhea, nausea and vomiting.   Endocrine: Negative for cold intolerance and heat intolerance.   Genitourinary: Negative for decreased urine volume, difficulty  urinating, dysuria, frequency, hematuria and urgency.   Musculoskeletal: Negative for arthralgias, back pain, gait problem, joint swelling, myalgias, neck pain and neck stiffness.   Integumentary:  Negative for color change, pallor, rash and wound.   Neurological: Negative for dizziness, weakness, light-headedness, numbness and headaches.   Hematological: Negative for adenopathy. Does not bruise/bleed easily.   Psychiatric/Behavioral: Negative for confusion, dysphoric mood and sleep disturbance. The patient is not nervous/anxious.          Objective:      Physical Exam  Vitals and nursing note reviewed.   Constitutional:       General: He is not in acute distress.     Appearance: Normal appearance. He is well-developed. He is obese. He is not diaphoretic.      Comments: Presents alone  Pleasant  ECOG= 0   HENT:      Head: Normocephalic and atraumatic.   Eyes:      General: No scleral icterus.     Extraocular Movements: Extraocular movements intact.      Conjunctiva/sclera: Conjunctivae normal.      Pupils: Pupils are equal, round, and reactive to light.   Neck:      Thyroid: No thyromegaly.      Trachea: No tracheal deviation.   Cardiovascular:      Rate and Rhythm: Normal rate and regular rhythm.      Heart sounds: Normal heart sounds. No murmur heard.    No friction rub. No gallop.   Pulmonary:      Effort: Pulmonary effort is normal. No respiratory distress.      Breath sounds: Normal breath sounds. No wheezing, rhonchi or rales.   Chest:      Chest wall: No tenderness.   Abdominal:      General: Bowel sounds are normal. There is no distension.      Palpations: Abdomen is soft. There is no mass.      Tenderness: There is no abdominal tenderness. There is no guarding or rebound.      Comments: No organomegaly   Musculoskeletal:         General: No swelling, tenderness or deformity. Normal range of motion.      Cervical back: Normal range of motion and neck supple. No rigidity or tenderness.      Right lower leg:  Edema (trace, chronic) present.      Left lower leg: Edema (trace, chronic) present.      Comments: Left > right edema LE   Lymphadenopathy:      Cervical: No cervical adenopathy.   Skin:     General: Skin is warm and dry.      Coloration: Skin is not jaundiced or pale.      Findings: No erythema or rash.   Neurological:      General: No focal deficit present.      Mental Status: He is alert and oriented to person, place, and time. Mental status is at baseline.      Cranial Nerves: No cranial nerve deficit.      Sensory: No sensory deficit.      Motor: No weakness or abnormal muscle tone.      Coordination: Coordination normal.      Gait: Gait normal.      Deep Tendon Reflexes: Reflexes are normal and symmetric. Reflexes normal.   Psychiatric:         Mood and Affect: Mood normal.         Behavior: Behavior normal.         Thought Content: Thought content normal.         Judgment: Judgment normal.       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 2 weeks [facility]- EP, labs (cbc,, cmp) and chemo; then in 3 weeks just cbc, cmp and chemo, in 4 weeks EP with MD (cbc, cmp) and chemo and Zometa   Follow up with [facility] 2 weeks.   Labs CBC and CMP   Lab interval:     Imaging    Pharmacy appointment    Other referrals          Treatment Plan Information   OP BREAST NAB-PACLITAXEL D1, D8, D15 + ALPELISIB    Rosa Linn MD   Upcoming Treatment Dates - OP BREAST NAB-PACLITAXEL D1, D8, D15 +  ALPELISIB     4/1/2022       Chemotherapy       PACLitaxel-protein bound (ABRAXANE) 80 mg/m2 = 240 mg in 48 mL infusion  4/15/2022       Chemotherapy       PACLitaxel-protein bound (ABRAXANE) 80 mg/m2 = 240 mg in 48 mL infusion  4/22/2022       Chemotherapy       PACLitaxel-protein bound (ABRAXANE) 80 mg/m2 = 240 mg in 48 mL infusion  4/29/2022       Chemotherapy       PACLitaxel-protein bound (ABRAXANE) 80 mg/m2 = 240 mg in 48 mL infusion    Supportive Plan Information  OP FILGRASTIM 480 MCG   Michelle Grayson NP   Upcoming Treatment Dates - OP FILGRASTIM 480 MCG    No upcoming days in selected categories.    Therapy Plan Information  PORT FLUSH  Flushes  heparin, porcine (PF) 100 unit/mL injection flush 500 Units  500 Units, Intravenous, Every visit  sodium chloride 0.9% flush 10 mL  10 mL, Intravenous, Every visit    ZOLEDRONIC ACID (ZOMETA) IV  Medications  zoledronic 4 mg/100 mL infusion 4 mg  4 mg, Intravenous, Every 4 weeks  Flushes  sodium chloride 0.9% 250 mL flush bag  Intravenous, Every 4 weeks  sodium chloride 0.9% flush 10 mL  10 mL, Intravenous, Every 4 weeks  heparin, porcine (PF) 100 unit/mL injection flush 500 Units  500 Units, Intravenous, Every 4 weeks  alteplase injection 2 mg  2 mg, Intra-Catheter, Every 4 weeks

## 2022-04-08 ENCOUNTER — PATIENT MESSAGE (OUTPATIENT)
Dept: HEMATOLOGY/ONCOLOGY | Facility: CLINIC | Age: 47
End: 2022-04-08
Payer: MEDICARE

## 2022-04-08 DIAGNOSIS — G89.3 NEOPLASM RELATED PAIN: ICD-10-CM

## 2022-04-08 RX ORDER — HYDROCODONE BITARTRATE AND ACETAMINOPHEN 10; 325 MG/1; MG/1
1 TABLET ORAL EVERY 6 HOURS PRN
Qty: 90 TABLET | Refills: 0 | Status: SHIPPED | OUTPATIENT
Start: 2022-04-08 | End: 2022-05-11 | Stop reason: SDUPTHER

## 2022-04-08 RX ORDER — HYDROCODONE BITARTRATE AND ACETAMINOPHEN 10; 325 MG/1; MG/1
1 TABLET ORAL EVERY 6 HOURS PRN
Qty: 90 TABLET | Refills: 0 | OUTPATIENT
Start: 2022-04-08

## 2022-04-13 ENCOUNTER — PATIENT MESSAGE (OUTPATIENT)
Dept: PALLIATIVE MEDICINE | Facility: CLINIC | Age: 47
End: 2022-04-13
Payer: MEDICARE

## 2022-04-14 ENCOUNTER — PATIENT MESSAGE (OUTPATIENT)
Dept: HEMATOLOGY/ONCOLOGY | Facility: CLINIC | Age: 47
End: 2022-04-14

## 2022-04-14 ENCOUNTER — INFUSION (OUTPATIENT)
Dept: INFUSION THERAPY | Facility: HOSPITAL | Age: 47
End: 2022-04-14
Attending: INTERNAL MEDICINE
Payer: MEDICARE

## 2022-04-14 ENCOUNTER — OFFICE VISIT (OUTPATIENT)
Dept: HEMATOLOGY/ONCOLOGY | Facility: CLINIC | Age: 47
End: 2022-04-14
Payer: MEDICARE

## 2022-04-14 VITALS
OXYGEN SATURATION: 98 % | TEMPERATURE: 98 F | RESPIRATION RATE: 16 BRPM | HEART RATE: 61 BPM | DIASTOLIC BLOOD PRESSURE: 71 MMHG | SYSTOLIC BLOOD PRESSURE: 123 MMHG

## 2022-04-14 VITALS
HEIGHT: 74 IN | DIASTOLIC BLOOD PRESSURE: 65 MMHG | WEIGHT: 315 LBS | TEMPERATURE: 98 F | RESPIRATION RATE: 12 BRPM | OXYGEN SATURATION: 97 % | HEART RATE: 72 BPM | BODY MASS INDEX: 40.43 KG/M2 | SYSTOLIC BLOOD PRESSURE: 132 MMHG

## 2022-04-14 DIAGNOSIS — R60.0 LOCALIZED EDEMA: ICD-10-CM

## 2022-04-14 DIAGNOSIS — C50.021 MALIGNANT NEOPLASM INVOLVING BOTH NIPPLE AND AREOLA OF RIGHT BREAST IN MALE, ESTROGEN RECEPTOR NEGATIVE: Primary | ICD-10-CM

## 2022-04-14 DIAGNOSIS — G62.9 NEUROPATHY: ICD-10-CM

## 2022-04-14 DIAGNOSIS — D64.81 ANEMIA ASSOCIATED WITH CHEMOTHERAPY: ICD-10-CM

## 2022-04-14 DIAGNOSIS — Z17.1 MALIGNANT NEOPLASM INVOLVING BOTH NIPPLE AND AREOLA OF RIGHT BREAST IN MALE, ESTROGEN RECEPTOR NEGATIVE: Primary | ICD-10-CM

## 2022-04-14 DIAGNOSIS — G89.3 NEOPLASM RELATED PAIN: ICD-10-CM

## 2022-04-14 DIAGNOSIS — T45.1X5A ANEMIA ASSOCIATED WITH CHEMOTHERAPY: ICD-10-CM

## 2022-04-14 DIAGNOSIS — C79.51 BONE METASTASES: ICD-10-CM

## 2022-04-14 PROCEDURE — 4010F PR ACE/ARB THEARPY RXD/TAKEN: ICD-10-PCS | Mod: CPTII,S$GLB,, | Performed by: NURSE PRACTITIONER

## 2022-04-14 PROCEDURE — 3078F PR MOST RECENT DIASTOLIC BLOOD PRESSURE < 80 MM HG: ICD-10-PCS | Mod: CPTII,S$GLB,, | Performed by: NURSE PRACTITIONER

## 2022-04-14 PROCEDURE — 25000003 PHARM REV CODE 250: Performed by: NURSE PRACTITIONER

## 2022-04-14 PROCEDURE — 3075F SYST BP GE 130 - 139MM HG: CPT | Mod: CPTII,S$GLB,, | Performed by: NURSE PRACTITIONER

## 2022-04-14 PROCEDURE — 99215 OFFICE O/P EST HI 40 MIN: CPT | Mod: S$GLB,,, | Performed by: NURSE PRACTITIONER

## 2022-04-14 PROCEDURE — 96360 HYDRATION IV INFUSION INIT: CPT

## 2022-04-14 PROCEDURE — 3008F PR BODY MASS INDEX (BMI) DOCUMENTED: ICD-10-PCS | Mod: CPTII,S$GLB,, | Performed by: NURSE PRACTITIONER

## 2022-04-14 PROCEDURE — 3078F DIAST BP <80 MM HG: CPT | Mod: CPTII,S$GLB,, | Performed by: NURSE PRACTITIONER

## 2022-04-14 PROCEDURE — 3075F PR MOST RECENT SYSTOLIC BLOOD PRESS GE 130-139MM HG: ICD-10-PCS | Mod: CPTII,S$GLB,, | Performed by: NURSE PRACTITIONER

## 2022-04-14 PROCEDURE — 96413 CHEMO IV INFUSION 1 HR: CPT

## 2022-04-14 PROCEDURE — 99999 PR PBB SHADOW E&M-EST. PATIENT-LVL V: ICD-10-PCS | Mod: PBBFAC,,, | Performed by: NURSE PRACTITIONER

## 2022-04-14 PROCEDURE — 4010F ACE/ARB THERAPY RXD/TAKEN: CPT | Mod: CPTII,S$GLB,, | Performed by: NURSE PRACTITIONER

## 2022-04-14 PROCEDURE — 99999 PR PBB SHADOW E&M-EST. PATIENT-LVL V: CPT | Mod: PBBFAC,,, | Performed by: NURSE PRACTITIONER

## 2022-04-14 PROCEDURE — A4216 STERILE WATER/SALINE, 10 ML: HCPCS | Performed by: NURSE PRACTITIONER

## 2022-04-14 PROCEDURE — 99215 PR OFFICE/OUTPT VISIT, EST, LEVL V, 40-54 MIN: ICD-10-PCS | Mod: S$GLB,,, | Performed by: NURSE PRACTITIONER

## 2022-04-14 PROCEDURE — 63600175 PHARM REV CODE 636 W HCPCS: Mod: JG | Performed by: NURSE PRACTITIONER

## 2022-04-14 PROCEDURE — 3008F BODY MASS INDEX DOCD: CPT | Mod: CPTII,S$GLB,, | Performed by: NURSE PRACTITIONER

## 2022-04-14 RX ORDER — HEPARIN 100 UNIT/ML
500 SYRINGE INTRAVENOUS
Status: CANCELLED | OUTPATIENT
Start: 2022-04-15

## 2022-04-14 RX ORDER — SODIUM CHLORIDE 9 MG/ML
INJECTION, SOLUTION INTRAVENOUS CONTINUOUS
Status: CANCELLED | OUTPATIENT
Start: 2022-04-15

## 2022-04-14 RX ORDER — SODIUM CHLORIDE 0.9 % (FLUSH) 0.9 %
10 SYRINGE (ML) INJECTION
Status: CANCELLED | OUTPATIENT
Start: 2022-04-15

## 2022-04-14 RX ORDER — HEPARIN 100 UNIT/ML
500 SYRINGE INTRAVENOUS
Status: DISCONTINUED | OUTPATIENT
Start: 2022-04-14 | End: 2022-04-14 | Stop reason: HOSPADM

## 2022-04-14 RX ORDER — SODIUM CHLORIDE 9 MG/ML
INJECTION, SOLUTION INTRAVENOUS CONTINUOUS
Status: DISCONTINUED | OUTPATIENT
Start: 2022-04-14 | End: 2022-04-14 | Stop reason: HOSPADM

## 2022-04-14 RX ORDER — SODIUM CHLORIDE 0.9 % (FLUSH) 0.9 %
10 SYRINGE (ML) INJECTION
Status: DISCONTINUED | OUTPATIENT
Start: 2022-04-14 | End: 2022-04-14 | Stop reason: HOSPADM

## 2022-04-14 RX ADMIN — SODIUM CHLORIDE: 0.9 INJECTION, SOLUTION INTRAVENOUS at 03:04

## 2022-04-14 RX ADMIN — PACLITAXEL 240 MG: 100 INJECTION, POWDER, LYOPHILIZED, FOR SUSPENSION INTRAVENOUS at 04:04

## 2022-04-14 RX ADMIN — HEPARIN 500 UNITS: 100 SYRINGE at 05:04

## 2022-04-14 RX ADMIN — Medication 10 ML: at 05:04

## 2022-04-14 NOTE — PROGRESS NOTES
Subjective:       Patient ID: Paul Li Jr. is a 46 y.o. male.    Chief Complaint: 2 weeks JAC- EP, labs (cbc,, cmp) and chemo; then in 3 week and Arm Swelling (RT/)    HPI     Returns for C9D1 (Abraxane and PO Aplelisib)    Started lozada and electrical school today.   Reports Hydrocele may need to get drained as surgery can't be done until late summer.   States overall feels great.   Good energy level.   Slightly increased swelling to right leg.   +lymphedema to right arm- did not get PT set up.   No pain issues  Chronic neuropathy-- better with 300 mg BID of Neurontin.   Feels more energy on Prozac  No fevers, chills, or infections  Reports exercising more now- walks 4-6 days per week  Notes improved blood glucose control  Less joint pain     Most recent Imagin/15/2022 PET Scan:  FINDINGS:  Quality of the study: Adequate.  In the head and neck, there are no hypermetabolic lesions worrisome for malignancy. There are no hypermetabolic mucosal lesions, and there are no pathologically enlarged or hypermetabolic lymph nodes.  In the chest, there are no hypermetabolic lesions worrisome for malignancy.  There are no pathologically enlarged or hypermetabolic lymph nodes.  Stable right lung pulmonary micronodules measuring 0.4 cm (series 2, image 83), below the size threshold for PET. No new pulmonary nodule or mass.  Postoperative changes of right mastectomy and axillary dissection.  In the abdomen and pelvis, there is physiologic tracer distribution within the abdominal organs and excretion into the genitourinary system.  In the bones, there is normalized uptake within prior hypermetabolic osseous lesions which demonstrate progressed sclerosis on CT.  Prominent sclerosis involving the T12 vertebral body, sacrum, and iliac bone.  Mild uptake within the left pubic symphysis without correlate lesion on CT, potentially representing physiologic marrow uptake.  In the extremities, there are no hypermetabolic  lesions worrisome for malignancy.  Incidental CT findings: Sinus disease.  Left-sided chest port with catheter tip terminating in the SVC.  Diffuse hypoattenuation of the liver compatible with hepatic steatosis.  Impression:  Favorable response to therapy with normalized uptake and increased sclerosis of osseous lesions.  No new hypermetabolic tumor.  Stable right lung pulmonary micronodules, below the size threshold for PET.  Attention on follow-up.     Diagnosis:  Metastatic TNBC progressed (prior Stage IB ([id number]) triple negative invasive ductal carcinoma with lobular features of right breast, retroareolar, Grade 2, Ki67 80%, diagnosed 2019)     Oncology History:  Metastatic  Set up for scans (due to back pain) which are consistent for recurrence.   Imagin/3/2021 MRI T/L spine:  FINDINGS:  Alignment: Within normal limits.  Vertebrae: Low T1 signal intensity in the left T12 vertebral body extending to the left pedicle, S1 and S2 vertebral bodies with abnormal enhancement.  The vertebral heights are well maintained.  No evidence of acute fractures.  Abnormal appearance of the LEFT sacrum and ilium as well.  Discs: Degenerative disease of the level of L4-L5 with posterior annular fissure.  Conus appears within normal limits terminating at the level of the L2. level.  Cauda equina appears unremarkable.  Mild circumferential disc bulging at the level of T10-T11 abutting the ventral thecal sac.  No significant spinal canal stenosis or neural foraminal narrowing throughout the thoracic spine.  No significant spinal canal stenosis or neural foraminal narrowing throughout the lumbar spine.  Paraspinous muscles and soft tissues: Subcentimeter focal enhancement in the posterior column along the supraspinous ligament at the level of L1-L2 (sagittal series 21, image 10) potentially inflammatory versus neoplastic.  Impression:  Abnormal appearance of the T12, S1, S2 vertebral bodies as well as LEFT sacrum and ilium  concerning for metastatic disease in this patient with history of breast cancer.  No evidence for significant central canal narrowing.  Additional findings, as above.  This report was flagged in Epic as abnormal.     6/9/2021 PET scan:  COMPARISON:  MRI thoracic and lumbar spine 06/03/2021  FINDINGS:  Quality of the study: Adequate.  In the head and neck, there are no hypermetabolic lesions worrisome for malignancy. There are no hypermetabolic mucosal lesions, and there are no pathologically enlarged or hypermetabolic lymph nodes.  In the chest, there is postoperative change of right mastectomy/right axillary lymph node dissection.  There is low level hypermetabolic activity with mild soft tissue thickening in the skin/superficial subcutaneous fat of the right chest wall (axial fused image 78) with SUV max 3.6.  There is soft tissue thickening measuring approximately 1.8 x 7.9 cm in the subcutaneous fat of the right chest wall (axial series 3, image 84) with SUV max 3.1.  There are a few bilateral pulmonary nodules measuring up to 0.3 cm in the left lung (axial series 3, image 88) and 0.5 cm in the right lung (axial series 3, image 84).  These nodules are too small to characterize by PET.  There are no pathologically enlarged or hypermetabolic lymph nodes.  In the abdomen and pelvis, there is physiologic tracer distribution within the abdominal organs and excretion into the genitourinary system.  Subtle sen mesentery and multiple prominent mesenteric lymph nodes measuring up to 1.8 cm in long axis with background activity.  In the bones, there are several hypermetabolic lesions worrisome for malignancy.  Sclerotic lesion in the left T12 vertebral body (axial series 3, image 131) with SUV max 12.  Sclerotic lesions in the sacrum (for example axial series 3, image 188) with SUV max 9.7.  Sclerotic lesions in the left iliac bone (for example axial series 3, image 205) with SUV max 6.  In the extremities, there are no  hypermetabolic lesions worrisome for malignancy.  Incidental findings:  Bilateral maxillary antra mucous retention cysts.  Fat containing right inguinal hernia.  Impression:  1. In this patient with right breast carcinoma status post mastectomy/right axillary lymph node dissection, there are multiple sclerotic lesions in the T12 vertebral body, sacrum, and left iliac bone with hypermetabolic activity concerning for active metastatic disease and in keeping with findings on MRI 06/03/2021.  2. Additionally there is low-level hypermetabolic activity with soft tissue thickening in the right chest wall as detailed above.  These findings are nonspecific and may be related to postoperative/post radiation change, with recurrent malignancy not excluded.  3. Nonspecific mesenteric haziness and lymph nodes which may indicate mesenteric adenitis.  Recommend clinical correlation and attention on follow-up.  4. Additional findings as above.  I, Sohan Tillman MD, attest that I reviewed and interpreted the images.     In summary,   Started aplelisib 8/22/2021   Abraxane C1 started 8/13/2021  We had sent Guardant and discussed results with Molecular tumor board  He also had a repeat bx to confirm metastatic triple negative breast cancer  Plan:   Nab-Paclitaxel and Alpelisib  Reference: https://ascopubs.org/doi/abs/10.1200/JCO.[email or web address]_suppl.1018  Also on Zometa-      - C1D1 Abraxane 8/13/2021  - Started aplelisib 8/22/2021               Oncology History   Malignant neoplasm involving both nipple and areola of right breast in male, estrogen receptor negative   5/1/2019 Imaging Significant Findings     Mammogram and US  Impression:  Right  Mass: Right breast 14 mm mass at the retroareolar anterior position. Assessment: 4 - Suspicious finding. Biopsy is recommended.   Left  There is no mammographic or sonographic evidence of malignancy.  Recommendation:  Biopsy is recommended.      5/2/2019 Biopsy     BREAST, RIGHT, RETROAREOLAR MASS,  ULTRASOUND-GUIDED BIOPSY:  Invasive ductal carcinoma with lobular features.  Size of invasive carcinoma: 5 MM in greatest linear dimension within a single      5/2/2019 Breast Tumor Markers     Estrogen: Negative  Progesterone: Negative  HER2: Negative  Ki67: 80      5/17/2019 Cancer Staged     Staging form: Breast, AJCC 8th Edition  - Clinical stage from 5/17/2019: Stage IB (cT1c, cN0, cM0, G2, ER-, AK-, HER2-) - Signed by Richa Bahena MD on 5/17/2019 5/27/2019 - 7/21/2019 Chemotherapy     Treatment Summary   Plan Name: OP BREAST DOSE-DENSE AC - DOXORUBICIN CYCLOPHOSPHAMIDE Q2W  Treatment Goal: Curative  Status: Inactive  Start Date: 5/27/2019  End Date: 7/9/2019  Provider: Richa Bahena MD  Chemotherapy: DOXOrubicin chemo injection 186 mg, 60 mg/m2 = 186 mg, Intravenous, Clinic/HOD 1 time, 4 of 4 cycles  Administration: 186 mg (5/27/2019), 186 mg (6/10/2019), 186 mg (6/24/2019), 186 mg (7/8/2019)  cyclophosphamide (CYTOXAN) 600 mg/m2 = 1,865 mg in sodium chloride 0.9% 250 mL chemo infusion, 600 mg/m2 = 1,865 mg, Intravenous, Clinic/HOD 1 time, 4 of 4 cycles  Administration: 1,865 mg (5/27/2019), 1,865 mg (6/10/2019), 1,865 mg (6/24/2019), 1,865 mg (7/8/2019)      7/22/2019 - 10/15/2019 Chemotherapy     Treatment Summary   Plan Name: OP PACLITAXEL QW  Treatment Goal: Curative  Status: Inactive  Start Date: 7/24/2019  End Date: 10/8/2019  Provider: Richa Bahena MD  Chemotherapy: PACLitaxel (TAXOL) 80 mg/m2 = 246 mg in sodium chloride 0.9% 250 mL chemo infusion, 80 mg/m2 = 246 mg, Intravenous, Clinic/HOD 1 time, 12 of 12 cycles  Dose modification: 60 mg/m2 (original dose 80 mg/m2, Cycle 3), 55 mg/m2 (original dose 80 mg/m2, Cycle 7), 60 mg/m2 (original dose 80 mg/m2, Cycle 7), 50 mg/m2 (original dose 80 mg/m2, Cycle 9), 50 mg/m2 (original dose 80 mg/m2, Cycle 10)  Administration: 246 mg (7/24/2019), 246 mg (7/31/2019), 186 mg (8/7/2019), 186 mg (8/14/2019), 186 mg (8/21/2019), 186 mg (8/28/2019),  186 mg (9/4/2019), 174 mg (9/11/2019), 156 mg (9/18/2019), 156 mg (9/25/2019), 156 mg (10/1/2019), 156 mg (10/8/2019)      11/22/2019 Breast Surgery     On 11/22/19 Dr whyte performed a right breast mastectomy with SLN biopsy with pathology showing grade 2, 6 mm IDC with extensive treatment effect, no LVI with 1 LN positive 4 mm, negative margins. The on 12/17/19, Dr Whyte performed right axillary dissection with pathology still pending.      11/22/2019 Cancer Staged     ypT1b N1      12/17/2019 Breast Surgery     Surgery: Dr Shima Whyte, [phone] performed right ALND with pathology showing 14 negative lymph nodes.             1/14/2020 Tumor Conference      Post mastectomy radiation therapy: Xeloda, then possible Keytruda trial .      2/3/2020 - 3/23/2020 Radiation Therapy     Treating physician: Speedy Nair MD   Total Dose: 50.4 Gy to the chest wall and regional lymphatics  10 Gy boost to the prostate.   Fractions: 28 fractions at 1.8 Gy per fraction  5 fractions at 2 Gy per fraction       2/28/2020 -  Chemotherapy     * 4/15/2020 - 9/28/20 completed 6 cycles adjuvant Xeloda 1000 mg/m2 bid days 1-14 every 21 days  Treatment Summary   Plan Name: OP CAPECITABINE 1250MG/M2 BID Q3W  Treatment Goal: Curative  Status: Active  Start Date: 3/20/2020  End Date: 3/20/2020  Provider: Richa Bahena MD  Chemotherapy: [No matching medication found in this treatment plan]        Review of Systems   Constitutional:        See above   All other systems reviewed and are negative.        Objective:      Physical Exam  Vitals and nursing note reviewed.   Constitutional:       General: He is not in acute distress.     Appearance: Normal appearance. He is well-developed. He is obese. He is not diaphoretic.      Comments: Presents alone  Pleasant  ECOG= 0   HENT:      Head: Normocephalic and atraumatic.   Eyes:      General: No scleral icterus.     Extraocular Movements: Extraocular movements intact.       Conjunctiva/sclera: Conjunctivae normal.      Pupils: Pupils are equal, round, and reactive to light.   Neck:      Thyroid: No thyromegaly.      Trachea: No tracheal deviation.   Cardiovascular:      Rate and Rhythm: Normal rate and regular rhythm.      Heart sounds: Normal heart sounds. No murmur heard.    No friction rub. No gallop.   Pulmonary:      Effort: Pulmonary effort is normal. No respiratory distress.      Breath sounds: Normal breath sounds. No wheezing, rhonchi or rales.   Chest:      Chest wall: No tenderness.   Abdominal:      General: Bowel sounds are normal. There is no distension.      Palpations: Abdomen is soft. There is no mass.      Tenderness: There is no abdominal tenderness. There is no guarding or rebound.      Comments: No organomegaly   Musculoskeletal:         General: No swelling, tenderness or deformity. Normal range of motion.      Cervical back: Normal range of motion and neck supple. No rigidity or tenderness.      Right lower leg: Edema (trace - 1+) present.      Left lower leg: Edema (trace, chronic) present.      Comments: No spinal or paraspinal tenderness.   Right arm lymphedema- wearing sleeve.    Lymphadenopathy:      Cervical: No cervical adenopathy.   Skin:     General: Skin is warm and dry.      Coloration: Skin is not jaundiced or pale.      Findings: No erythema or rash.   Neurological:      General: No focal deficit present.      Mental Status: He is alert and oriented to person, place, and time. Mental status is at baseline.      Sensory: Sensory deficit present.      Motor: No weakness or abnormal muscle tone.      Coordination: Coordination normal.      Gait: Gait normal.   Psychiatric:         Mood and Affect: Mood normal.         Behavior: Behavior normal.         Thought Content: Thought content normal.         Judgment: Judgment normal.       Labs- reviewed  Assessment:       Problem List Items Addressed This Visit        Neuro    Neuropathy       Oncology     Malignant neoplasm involving both nipple and areola of right breast in male, estrogen receptor negative - Primary    Bone metastases    Anemia associated with chemotherapy      Other Visit Diagnoses     Neoplasm related pain        Localized edema        Relevant Orders    US Lower Extremity Veins Right          Plan:           Metastatic breast cancer-Proceed with Abraxane C9D1  Continue Piqray  Zometa monthly   Imaging post C9  Continue Norco prn  Monitor anemia-discussed low iron levels. Will start feosol MWF.   Monitor neuropathy. Continue gabapentin  RLE US to rule out DVT  Lymphedema therapy - will message Leann to get set up.   (Teams message sent as well for above)   Reassurance given  Continue calcium.     Cc chart previously sent and appts made.     Patient is in agreement with the proposed treatment plan. All questions were answered to the patient's satisfaction. Pt knows to call clinic for any new or worsening symptoms and if anything is needed before the next clinic visit.      Richard Keys, FNP-C  Hematology & Oncology  [facility] [place] [address]  ph. [phone]  Fax. [phone]     Face to Face time with patient: 30 minutes  45 minutes of total time spent on the encounter, which includes face to face time and non-face to face time preparing to see the patient (eg, review of tests), Obtaining and/or reviewing separately obtained history, Documenting clinical information in the electronic or other health record, Independently interpreting results (not separately reported) and communicating results to the patient/family/caregiver, or Care coordination (not separately reported).

## 2022-04-14 NOTE — PLAN OF CARE
1714  Patient completed Abraxane infusion as well as 500 cc bolus, tolerated well, VSS.  Port de accessed without issue, flushed, heparin locked.  RTC 4/22/22 Patient ambulated off floor independently.

## 2022-04-14 NOTE — PLAN OF CARE
1500  Patient seated in chair, VSS, Assessment done.  Port accessed without issue, flushed, blood return noted.  Started on NS @ 25 cc/hr KVO while waiting for Abraxane from pharmacy.  [facility] for safety

## 2022-04-15 ENCOUNTER — PATIENT MESSAGE (OUTPATIENT)
Dept: HEMATOLOGY/ONCOLOGY | Facility: CLINIC | Age: 47
End: 2022-04-15
Payer: MEDICARE

## 2022-04-18 ENCOUNTER — PATIENT MESSAGE (OUTPATIENT)
Dept: HEMATOLOGY/ONCOLOGY | Facility: CLINIC | Age: 47
End: 2022-04-18
Payer: MEDICARE

## 2022-04-18 DIAGNOSIS — C50.021 MALIGNANT NEOPLASM INVOLVING BOTH NIPPLE AND AREOLA OF RIGHT BREAST IN MALE, ESTROGEN RECEPTOR NEGATIVE: ICD-10-CM

## 2022-04-18 DIAGNOSIS — Z17.1 MALIGNANT NEOPLASM INVOLVING BOTH NIPPLE AND AREOLA OF RIGHT BREAST IN MALE, ESTROGEN RECEPTOR NEGATIVE: ICD-10-CM

## 2022-04-18 DIAGNOSIS — C79.51 BONE METASTASES: ICD-10-CM

## 2022-04-21 DIAGNOSIS — C80.1 ANXIETY ASSOCIATED WITH CANCER DIAGNOSIS: ICD-10-CM

## 2022-04-21 DIAGNOSIS — F41.1 ANXIETY ASSOCIATED WITH CANCER DIAGNOSIS: ICD-10-CM

## 2022-04-22 ENCOUNTER — SPECIALTY PHARMACY (OUTPATIENT)
Dept: PHARMACY | Facility: CLINIC | Age: 47
End: 2022-04-22
Payer: MEDICARE

## 2022-04-22 ENCOUNTER — TELEPHONE (OUTPATIENT)
Dept: HEMATOLOGY/ONCOLOGY | Facility: CLINIC | Age: 47
End: 2022-04-22
Payer: MEDICARE

## 2022-04-22 ENCOUNTER — PATIENT MESSAGE (OUTPATIENT)
Dept: HEMATOLOGY/ONCOLOGY | Facility: CLINIC | Age: 47
End: 2022-04-22
Payer: MEDICARE

## 2022-04-22 ENCOUNTER — INFUSION (OUTPATIENT)
Dept: INFUSION THERAPY | Facility: HOSPITAL | Age: 47
End: 2022-04-22
Payer: MEDICARE

## 2022-04-22 VITALS
SYSTOLIC BLOOD PRESSURE: 140 MMHG | OXYGEN SATURATION: 100 % | RESPIRATION RATE: 18 BRPM | TEMPERATURE: 98 F | DIASTOLIC BLOOD PRESSURE: 80 MMHG | HEART RATE: 63 BPM

## 2022-04-22 DIAGNOSIS — Z17.1 MALIGNANT NEOPLASM INVOLVING BOTH NIPPLE AND AREOLA OF RIGHT BREAST IN MALE, ESTROGEN RECEPTOR NEGATIVE: Primary | ICD-10-CM

## 2022-04-22 DIAGNOSIS — C50.021 MALIGNANT NEOPLASM INVOLVING BOTH NIPPLE AND AREOLA OF RIGHT BREAST IN MALE, ESTROGEN RECEPTOR NEGATIVE: Primary | ICD-10-CM

## 2022-04-22 PROCEDURE — 96413 CHEMO IV INFUSION 1 HR: CPT

## 2022-04-22 PROCEDURE — A4216 STERILE WATER/SALINE, 10 ML: HCPCS | Performed by: INTERNAL MEDICINE

## 2022-04-22 PROCEDURE — 63600175 PHARM REV CODE 636 W HCPCS: Performed by: INTERNAL MEDICINE

## 2022-04-22 PROCEDURE — 25000003 PHARM REV CODE 250: Performed by: INTERNAL MEDICINE

## 2022-04-22 RX ORDER — SODIUM CHLORIDE 9 MG/ML
INJECTION, SOLUTION INTRAVENOUS CONTINUOUS
Status: CANCELLED | OUTPATIENT
Start: 2022-04-22

## 2022-04-22 RX ORDER — HEPARIN 100 UNIT/ML
500 SYRINGE INTRAVENOUS
Status: CANCELLED | OUTPATIENT
Start: 2022-04-22

## 2022-04-22 RX ORDER — ALPRAZOLAM 0.5 MG/1
0.5 TABLET ORAL 3 TIMES DAILY PRN
Qty: 60 TABLET | Refills: 0 | Status: SHIPPED | OUTPATIENT
Start: 2022-04-22 | End: 2022-06-04 | Stop reason: SDUPTHER

## 2022-04-22 RX ORDER — SODIUM CHLORIDE 0.9 % (FLUSH) 0.9 %
10 SYRINGE (ML) INJECTION
Status: DISCONTINUED | OUTPATIENT
Start: 2022-04-22 | End: 2022-04-22 | Stop reason: HOSPADM

## 2022-04-22 RX ORDER — SODIUM CHLORIDE 0.9 % (FLUSH) 0.9 %
10 SYRINGE (ML) INJECTION
Status: CANCELLED | OUTPATIENT
Start: 2022-04-22

## 2022-04-22 RX ORDER — SODIUM CHLORIDE 9 MG/ML
INJECTION, SOLUTION INTRAVENOUS CONTINUOUS
Status: DISCONTINUED | OUTPATIENT
Start: 2022-04-22 | End: 2022-04-22 | Stop reason: HOSPADM

## 2022-04-22 RX ORDER — HEPARIN 100 UNIT/ML
500 SYRINGE INTRAVENOUS
Status: DISCONTINUED | OUTPATIENT
Start: 2022-04-22 | End: 2022-04-22 | Stop reason: HOSPADM

## 2022-04-22 RX ADMIN — HEPARIN SODIUM (PORCINE) LOCK FLUSH IV SOLN 100 UNIT/ML 500 UNITS: 100 SOLUTION at 04:04

## 2022-04-22 RX ADMIN — SODIUM CHLORIDE: 9 INJECTION, SOLUTION INTRAVENOUS at 03:04

## 2022-04-22 RX ADMIN — Medication 10 ML: at 04:04

## 2022-04-22 RX ADMIN — PACLITAXEL 240 MG: 100 INJECTION, POWDER, LYOPHILIZED, FOR SUSPENSION INTRAVENOUS at 04:04

## 2022-04-22 NOTE — PLAN OF CARE
1639-Pt tolerated Abraxane infusion well, no complications or side effects, POC and meds discussed with pt, pt aware of upcoming appts, pt knows to call MD with any questions or concerns. Pt ambulated off unit, no distress noted.

## 2022-04-22 NOTE — TELEPHONE ENCOUNTER
Incoming call from [facility] checking on status of Piqray CONTINUATION of therapy. PA required. Will alert [facility].

## 2022-04-22 NOTE — TELEPHONE ENCOUNTER
PA approved. [id number]. Approved until 12/31/22. Estimated copay is $9.85. forwarding to  for further assistance.

## 2022-04-22 NOTE — TELEPHONE ENCOUNTER
Spoke with Gloria at [facility] to get an update on the status of the Piqray refill.  Informed that she will send an alert to pharmacy staff that patient needs expedited PA renewal.  Updated patient through portal message.

## 2022-04-22 NOTE — TELEPHONE ENCOUNTER
BI: COMPLETE     MEDICARE PLAN: [employer]   Estimated copay: $9.85  Benefits Stage: Initial   IN NETWORK: yes   PA approval on file: [id number]  LIS level: 1

## 2022-04-22 NOTE — TELEPHONE ENCOUNTER
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 is a 46 y.o. male, who is followed by the specialty pharmacy service for management and education.    Recent Encounters     Date Type Provider Description    04/22/2022 Specialty Pharmacy Sera Nesbitt, PharmD Initial Clinical Assessment    04/22/2022 Specialty Pharmacy Gloria Flor, PharmD Referral Authorization    07/28/2021 Specialty Pharmacy Marilin Loo, PharmD Referral Authorization        Clinical call attempts since last clinical assessment   No call attempts found.     Current Outpatient Medications   Medication Sig    alpelisib 300 mg/day (150 mg x 2) Tab Take 300 mg by mouth once daily.    ALPRAZolam (XANAX) 0.5 MG tablet Take 1 tablet (0.5 mg total) by mouth 3 (three) times daily as needed for Insomnia or Anxiety.    amLODIPine (NORVASC) 10 MG tablet TAKE 1 TABLET (10 MG) BY MOUTH EVERY DAY    calcium-vitamin D3 (OYSTER SHELL CALCIUM-VIT D3) 500 mg-5 mcg (200 unit) per tablet Take 1 tablet by mouth 2 (two) times daily.    diphenoxylate-atropine 2.5-0.025 mg (LOMOTIL) 2.5-0.025 mg per tablet Take 1 tablet by mouth 4 (four) times daily as needed for Diarrhea.    dulaglutide (TRULICITY) 1.5 mg/0.5 mL PnIj Inject 1.5 mg into the skin once a week. Trulicity 0.75mg weekly for 4 weeks & then increase to 1.5 mg weekly indefinitely. (Patient taking differently: Inject 1.5 mg into the skin once a week. Trulicity 0.75mg weekly for 4 weeks & then increase to 1.5 mg weekly indefinitely.(PATIENT TAKES ON SUNDAYS))    FLUoxetine 20 MG capsule Take 2 capsules (40 mg total) by mouth  "once daily.    gabapentin (NEURONTIN) 300 MG capsule Take 1 capsule (300 mg total) by mouth 3 (three) times daily.    hydroCHLOROthiazide (HYDRODIURIL) 25 MG tablet TAKE 1 TABLET BY MOUTH ONCE DAILY    HYDROcodone-acetaminophen (NORCO)  mg per tablet Take 1 tablet by mouth every 6 (six) hours as needed for Pain.    insulin detemir U-100 (LEVEMIR FLEXTOUCH U-100 INSULN) 100 unit/mL (3 mL) InPn pen Inject 21 Units into the skin every evening.    insulin lispro 100 unit/mL pen Inject 5 Units into the skin 3 (three) times daily with meals.    lancets 33 gauge Misc 1 lancet by Misc.(Non-Drug; Combo Route) route once daily.    lancing device Misc 1 Device by Misc.(Non-Drug; Combo Route) route 2 (two) times daily with meals.    LIDOcaine-prilocaine (EMLA) cream Apply topically as needed. (Patient not taking: Reported on 4/14/2022)    losartan (COZAAR) 50 MG tablet Take 2 tablets by mouth once daily    metFORMIN (GLUCOPHAGE) 500 MG tablet Take 2 tablets (1,000 mg total) by mouth 2 (two) times daily with meals.    ondansetron (ZOFRAN-ODT) 8 MG TbDL Take 1 tablet (8 mg total) by mouth every 8 (eight) hours as needed (nausea). (Patient not taking: Reported on 4/14/2022)    pen needle, diabetic (BD ULTRA-FINE TANESHA PEN NEEDLE) 32 gauge x 5/32" Ndle Use as directed with novolog and levemir    tadalafiL (CIALIS) 5 MG tablet Take 2 tablets (10 mg total) by mouth daily as needed for Erectile Dysfunction.   Last reviewed on 4/14/2022  2:58 PM by Chaya Dumont RN    Review of patient's allergies indicates:  No Known AllergiesLast reviewed on  4/22/2022 3:04 PM by Nehal Mejias    Drug Interactions    Drug interactions evaluated: yes  Clinically relevant drug interactions identified: yes   Interactions list: Diabetes drugs and Piqray. Piqray causes hyperglycemia    Drug management plan: Pt has been taking for a while. Monitoring BG    Provided the patient with educational material regarding drug interactions: " yes   Educational material comments: Pt has been taking for a while. Monitoring BG           Adverse Effects    *All other systems reviewed and are negative       Assessment Questions - Documented Responses    Flowsheet Row Most Recent Value   Assessment    Medication Reconciliation completed for patient Yes   During the past 4 weeks, has patient missed any activities due to condition or medication? No   During the past 4 weeks, did patient have any of the following urgent care visits? None   Goals of Therapy Status Discussed (new start)   Status of the patients ability to self-administer: Is Able   All education points have been covered with patient? Yes, supplemental printed education provided   Welcome packet contents reviewed and discussed with patient? Yes   Assesment completed? Yes   Plan Therapy being initiated   Do you need to open a clinical intervention (i-vent)? No   Do you want to schedule first shipment? Yes   Medication #1 Assessment Info    Patient status Existing medication, Exisiting to [facility]   Is this medication appropriate for the patient? Yes   Is this medication effective? Not yet started        Refill Questions - Documented Responses    Flowsheet Row Most Recent Value   Patient Availability and HIPAA Verification    Does patient want to proceed with activity? Yes   HIPAA/medical authority confirmed? Yes   Relationship to patient of person spoken to? Self   Refill Screening Questions    When does the patient need to receive the medication? 04/22/22   Refill Delivery Questions    How will the patient receive the medication? Pickup   When does the patient need to receive the medication? 04/22/22   Shipping Address Home   Address in [facility] confirmed and updated if neccessary? Yes   Expected Copay ($) 9.85   Payment Method at pickup   Days supply of Refill 30   Supplies needed? No supplies needed   Refill activity completed? Yes   Refill activity plan Refill scheduled   Shipment/Pickup  "Date: 04/22/22          Objective    He has a past medical history of Arthritis, Breast cancer, Cancer, Diabetes mellitus, HTN (hypertension), Leg edema, right, Prediabetes, Seizures, and Therapy.    Tried/failed medications: pt has been taking Piqray but has been getting PAP.     BP Readings from Last 4 Encounters:   04/22/22 (!) 140/80   04/14/22 123/71   04/14/22 132/65   03/31/22 (!) 120/58     Ht Readings from Last 4 Encounters:   04/14/22 6' 2" (1.88 m)   03/31/22 6' 2" (1.88 m)   03/25/22 6' 2" (1.88 m)   03/18/22 6' 1" (1.854 m)     Wt Readings from Last 4 Encounters:   04/14/22 (!) 156.6 kg (345 lb 3.9 oz)   03/31/22 (!) 156 kg (343 lb 14.7 oz)   03/25/22 (!) 158.4 kg (349 lb 3.3 oz)   03/18/22 (!) 161.8 kg (356 lb 11.3 oz)     Recent Labs   Lab Result Units 04/22/22  1415 04/14/22  1117 03/31/22  1430 03/25/22  0924   RBC M/uL 3.95 L 4.35 L 4.12 L 3.78 L   Hemoglobin g/dL 10.1 L 11.0 L 10.8 L 10.0 L   Hematocrit % 32.2 L 35.9 L 32.7 L 32.2 L   WBC K/uL 4.22 5.10 3.20 L 3.62 L   Gran # (ANC) K/uL 2.5 2.7 1.0 L 2.5   Gran % % 59.0 53.7 32.5 L  --    Platelets K/uL 229 219 244 203   Sodium mmol/L 141 138 138 138   Potassium mmol/L 4.1 4.2 4.2 4.0   Chloride mmol/L 106 101 102 104   Glucose mg/dL 94 135 H 114 H 190 H   BUN mg/dL 11 13 9 14   Creatinine mg/dL 1.1 1.2 1.2 1.2   Calcium mg/dL 9.2 9.8 9.9 8.8   Total Protein g/dL 7.4 7.7 7.8 7.2   Albumin g/dL 3.8 3.9 4.1 3.7   Total Bilirubin mg/dL 0.6 0.7 0.8 1.1 H   Alkaline Phosphatase U/L 83 89 82 75   AST U/L 22 15 19 19   ALT U/L 25 16 22 22     The goals of cancer treatment include:  · Achieving remission of cancer, if possible  · Reducing tumor size and spread of cancer, if remission is not possible  · Minimizing pain and symptoms of the cancer  · Preventing infection and other complications of treatment  · Promoting adequate nutrition  · Encouraging proper hydration  · Improving or maintaining quality of life  · Maintaining optimal therapy " adherence  · Minimizing and managing side effects    Goals of Therapy Status: Discussed (new start)    Assessment/Plan  Patient plans to start therapy on 04/22/22      Indication, dosage, appropriateness, effectiveness, safety and convenience of his specialty medication(s) were reviewed today.     Patient Education   Patient received education on the following:    Expectations and possible outcomes of therapy   Proper use, timely administration, and missed dose management   Duration of therapy   Side effects, including prevention, minimization, and management   Contraindications and safety precautions   New or changed medications, including prescribe and over the counter medications and supplements   Reviews recommended vaccinations, as appropriate   Storage, safe handling, and disposal        Tasks added this encounter   5/15/2022 - Refill Call (Auto Added)  4/23/2022 - Pickup Reminder  10/12/2022 - Clinical - Follow Up Assesement (180 day)   Tasks due within next 3 months   No tasks due.     Sera Nesbitt, PharmD  [facility]  [address]  [facility] [address]  Phone: [phone]  Fax: [phone]

## 2022-04-26 NOTE — PROGRESS NOTES
Subjective:       Patient ID: Paul Li Jr. is a 46 y.o. male.    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  In the head and neck, there are no hypermetabolic lesions worrisome for malignancy. There are no hypermetabolic mucosal lesions, and there are no pathologically enlarged or hypermetabolic lymph nodes.  In the chest, there are no hypermetabolic lesions worrisome for malignancy.  There are no pathologically enlarged or hypermetabolic lymph nodes.  Stable right lung pulmonary micronodules measuring 0.4 cm (series 2, image 83), below the size threshold for PET. No new pulmonary nodule or mass.  Postoperative changes of right mastectomy and axillary dissection.  In the abdomen and pelvis, there is physiologic tracer distribution within the abdominal organs and excretion into the genitourinary system.  In the bones, there is normalized uptake within prior hypermetabolic osseous lesions which demonstrate progressed sclerosis on CT.  Prominent sclerosis involving the T12 vertebral body, sacrum, and iliac bone.  Mild uptake within the left pubic symphysis without correlate lesion on CT, potentially representing physiologic marrow uptake.  In the extremities, there are no hypermetabolic lesions worrisome for malignancy.  Incidental CT findings: Sinus disease.  Left-sided chest port with catheter tip terminating  in the SVC.  Diffuse hypoattenuation of the liver compatible with hepatic steatosis.  Impression:  Favorable response to therapy with normalized uptake and increased sclerosis of osseous lesions.  No new hypermetabolic tumor.  Stable right lung pulmonary micronodules, below the size threshold for PET.  Attention on follow-up.     Diagnosis:  Metastatic TNBC progressed (prior Stage IB ([id number]) triple negative invasive ductal carcinoma with lobular features of right breast, retroareolar, Grade 2, Ki67 80%, diagnosed 2019)     Oncology History:  Metastatic  Set up for scans (due to back pain) which are consistent for recurrence.   Imagin/3/2021 MRI T/L spine:  FINDINGS:  Alignment: Within normal limits.  Vertebrae: Low T1 signal intensity in the left T12 vertebral body extending to the left pedicle, S1 and S2 vertebral bodies with abnormal enhancement.  The vertebral heights are well maintained.  No evidence of acute fractures.  Abnormal appearance of the LEFT sacrum and ilium as well.  Discs: Degenerative disease of the level of L4-L5 with posterior annular fissure.  Conus appears within normal limits terminating at the level of the L2. level.  Cauda equina appears unremarkable.  Mild circumferential disc bulging at the level of T10-T11 abutting the ventral thecal sac.  No significant spinal canal stenosis or neural foraminal narrowing throughout the thoracic spine.  No significant spinal canal stenosis or neural foraminal narrowing throughout the lumbar spine.  Paraspinous muscles and soft tissues: Subcentimeter focal enhancement in the posterior column along the supraspinous ligament at the level of L1-L2 (sagittal series 21, image 10) potentially inflammatory versus neoplastic.  Impression:  Abnormal appearance of the T12, S1, S2 vertebral bodies as well as LEFT sacrum and ilium concerning for metastatic disease in this patient with history of breast cancer.  No evidence for significant central canal  narrowing.  Additional findings, as above.  This report was flagged in Epic as abnormal.     6/9/2021 PET scan:  COMPARISON:  MRI thoracic and lumbar spine 06/03/2021  FINDINGS:  Quality of the study: Adequate.  In the head and neck, there are no hypermetabolic lesions worrisome for malignancy. There are no hypermetabolic mucosal lesions, and there are no pathologically enlarged or hypermetabolic lymph nodes.  In the chest, there is postoperative change of right mastectomy/right axillary lymph node dissection.  There is low level hypermetabolic activity with mild soft tissue thickening in the skin/superficial subcutaneous fat of the right chest wall (axial fused image 78) with SUV max 3.6.  There is soft tissue thickening measuring approximately 1.8 x 7.9 cm in the subcutaneous fat of the right chest wall (axial series 3, image 84) with SUV max 3.1.  There are a few bilateral pulmonary nodules measuring up to 0.3 cm in the left lung (axial series 3, image 88) and 0.5 cm in the right lung (axial series 3, image 84).  These nodules are too small to characterize by PET.  There are no pathologically enlarged or hypermetabolic lymph nodes.  In the abdomen and pelvis, there is physiologic tracer distribution within the abdominal organs and excretion into the genitourinary system.  Subtle sen mesentery and multiple prominent mesenteric lymph nodes measuring up to 1.8 cm in long axis with background activity.  In the bones, there are several hypermetabolic lesions worrisome for malignancy.  Sclerotic lesion in the left T12 vertebral body (axial series 3, image 131) with SUV max 12.  Sclerotic lesions in the sacrum (for example axial series 3, image 188) with SUV max 9.7.  Sclerotic lesions in the left iliac bone (for example axial series 3, image 205) with SUV max 6.  In the extremities, there are no hypermetabolic lesions worrisome for malignancy.  Incidental findings:  Bilateral maxillary antra mucous retention cysts.   Fat containing right inguinal hernia.  Impression:  1. In this patient with right breast carcinoma status post mastectomy/right axillary lymph node dissection, there are multiple sclerotic lesions in the T12 vertebral body, sacrum, and left iliac bone with hypermetabolic activity concerning for active metastatic disease and in keeping with findings on MRI 06/03/2021.  2. Additionally there is low-level hypermetabolic activity with soft tissue thickening in the right chest wall as detailed above.  These findings are nonspecific and may be related to postoperative/post radiation change, with recurrent malignancy not excluded.  3. Nonspecific mesenteric haziness and lymph nodes which may indicate mesenteric adenitis.  Recommend clinical correlation and attention on follow-up.  4. Additional findings as above.  I, Sohan Tillman MD, attest that I reviewed and interpreted the images.     In summary,   Started aplelisib 8/22/2021   Abraxane C1 started 8/13/2021  We had sent Guardant and discussed results with Molecular tumor board  He also had a repeat bx to confirm metastatic triple negative breast cancer  Plan:   Nab-Paclitaxel and Alpelisib  Reference: https://ascopubs.org/doi/abs/10.1200/JCO.[email or web address]_[email or web address]  Also on Zometa-      - C1D1 Abraxane 8/13/2021  - Started aplelisib 8/22/2021               Oncology History   Malignant neoplasm involving both nipple and areola of right breast in male, estrogen receptor negative   5/1/2019 Imaging Significant Findings     Mammogram and US  Impression:  Right  Mass: Right breast 14 mm mass at the retroareolar anterior position. Assessment: 4 - Suspicious finding. Biopsy is recommended.   Left  There is no mammographic or sonographic evidence of malignancy.  Recommendation:  Biopsy is recommended.      5/2/2019 Biopsy     BREAST, RIGHT, RETROAREOLAR MASS, ULTRASOUND-GUIDED BIOPSY:  Invasive ductal carcinoma with lobular features.  Size of invasive carcinoma: 5 MM in greatest  linear dimension within a single      5/2/2019 Breast Tumor Markers     Estrogen: Negative  Progesterone: Negative  HER2: Negative  Ki67: 80      5/17/2019 Cancer Staged     Staging form: Breast, AJCC 8th Edition  - Clinical stage from 5/17/2019: Stage IB (cT1c, cN0, cM0, G2, ER-, NJ-, HER2-) - Signed by Richa Bahena MD on 5/17/2019 5/27/2019 - 7/21/2019 Chemotherapy     Treatment Summary   Plan Name: OP BREAST DOSE-DENSE AC - DOXORUBICIN CYCLOPHOSPHAMIDE Q2W  Treatment Goal: Curative  Status: Inactive  Start Date: 5/27/2019  End Date: 7/9/2019  Provider: Richa Bahena MD  Chemotherapy: DOXOrubicin chemo injection 186 mg, 60 mg/m2 = 186 mg, Intravenous, Clinic/HOD 1 time, 4 of 4 cycles  Administration: 186 mg (5/27/2019), 186 mg (6/10/2019), 186 mg (6/24/2019), 186 mg (7/8/2019)  cyclophosphamide (CYTOXAN) 600 mg/m2 = 1,865 mg in sodium chloride 0.9% 250 mL chemo infusion, 600 mg/m2 = 1,865 mg, Intravenous, Clinic/HOD 1 time, 4 of 4 cycles  Administration: 1,865 mg (5/27/2019), 1,865 mg (6/10/2019), 1,865 mg (6/24/2019), 1,865 mg (7/8/2019)      7/22/2019 - 10/15/2019 Chemotherapy     Treatment Summary   Plan Name: OP PACLITAXEL QW  Treatment Goal: Curative  Status: Inactive  Start Date: 7/24/2019  End Date: 10/8/2019  Provider: Richa Bahena MD  Chemotherapy: PACLitaxel (TAXOL) 80 mg/m2 = 246 mg in sodium chloride 0.9% 250 mL chemo infusion, 80 mg/m2 = 246 mg, Intravenous, Clinic/HOD 1 time, 12 of 12 cycles  Dose modification: 60 mg/m2 (original dose 80 mg/m2, Cycle 3), 55 mg/m2 (original dose 80 mg/m2, Cycle 7), 60 mg/m2 (original dose 80 mg/m2, Cycle 7), 50 mg/m2 (original dose 80 mg/m2, Cycle 9), 50 mg/m2 (original dose 80 mg/m2, Cycle 10)  Administration: 246 mg (7/24/2019), 246 mg (7/31/2019), 186 mg (8/7/2019), 186 mg (8/14/2019), 186 mg (8/21/2019), 186 mg (8/28/2019), 186 mg (9/4/2019), 174 mg (9/11/2019), 156 mg (9/18/2019), 156 mg (9/25/2019), 156 mg (10/1/2019), 156 mg (10/8/2019)       11/22/2019 Breast Surgery     On 11/22/19 Dr whyte performed a right breast mastectomy with SLN biopsy with pathology showing grade 2, 6 mm IDC with extensive treatment effect, no LVI with 1 LN positive 4 mm, negative margins. The on 12/17/19, Dr Whyte performed right axillary dissection with pathology still pending.      11/22/2019 Cancer Staged     ypT1b N1      12/17/2019 Breast Surgery     Surgery: Dr Shima Whyte, [phone] performed right ALND with pathology showing 14 negative lymph nodes.             1/14/2020 Tumor Conference      Post mastectomy radiation therapy: Xeloda, then possible Keytruda trial .      2/3/2020 - 3/23/2020 Radiation Therapy     Treating physician: Speedy Nair MD   Total Dose: 50.4 Gy to the chest wall and regional lymphatics  10 Gy boost to the prostate.   Fractions: 28 fractions at 1.8 Gy per fraction  5 fractions at 2 Gy per fraction       2/28/2020 -  Chemotherapy     * 4/15/2020 - 9/28/20 completed 6 cycles adjuvant Xeloda 1000 mg/m2 bid days 1-14 every 21 days  Treatment Summary   Plan Name: OP CAPECITABINE 1250MG/M2 BID Q3W  Treatment Goal: Curative  Status: Active  Start Date: 3/20/2020  End Date: 3/20/2020  Provider: Richa Bahena MD  Chemotherapy: [No matching medication found in this treatment plan]         Review of Systems   Constitutional: Negative for activity change, appetite change, chills, fatigue, fever and unexpected weight change.   HENT: Negative for dental problem, postnasal drip, rhinorrhea, sinus pressure/congestion, sore throat, trouble swallowing and voice change.    Eyes: Negative for visual disturbance.   Respiratory: Negative for cough, shortness of breath and wheezing.    Cardiovascular: Positive for leg swelling (wears compression socks, improving). Negative for chest pain and palpitations.   Gastrointestinal: Negative for abdominal distention, abdominal pain, blood in stool, change in bowel habit, constipation, diarrhea, nausea,  vomiting and change in bowel habit.   Endocrine: Negative for cold intolerance and heat intolerance.   Genitourinary: Negative for decreased urine volume, difficulty urinating, dysuria, frequency, hematuria and urgency.   Musculoskeletal: Negative for arthralgias, back pain, gait problem, joint swelling, myalgias, neck pain and neck stiffness.   Integumentary:  Negative for color change, pallor, rash and wound.   Neurological: Positive for numbness (improved neuropathy). Negative for dizziness, weakness, light-headedness and headaches.   Hematological: Negative for adenopathy. Does not bruise/bleed easily.   Psychiatric/Behavioral: Negative for dysphoric mood and sleep disturbance. The patient is not nervous/anxious.          Objective:      Physical Exam  Vitals and nursing note reviewed.   Constitutional:       General: He is not in acute distress.     Appearance: Normal appearance. He is well-developed. He is obese. He is not diaphoretic.      Comments: Presents alone  Very pleasant  ECOG= 0   HENT:      Head: Normocephalic and atraumatic.   Eyes:      General: No scleral icterus.     Extraocular Movements: Extraocular movements intact.      Conjunctiva/sclera: Conjunctivae normal.      Pupils: Pupils are equal, round, and reactive to light.   Neck:      Thyroid: No thyromegaly.      Trachea: No tracheal deviation.   Cardiovascular:      Rate and Rhythm: Normal rate and regular rhythm.      Heart sounds: Normal heart sounds. No murmur heard.    No friction rub. No gallop.   Pulmonary:      Effort: Pulmonary effort is normal. No respiratory distress.      Breath sounds: Normal breath sounds. No wheezing, rhonchi or rales.   Chest:      Chest wall: No tenderness.   Abdominal:      General: Bowel sounds are normal. There is no distension.      Palpations: Abdomen is soft. There is no mass.      Tenderness: There is no abdominal tenderness. There is no guarding or rebound.      Comments: No organomegaly    Musculoskeletal:         General: Swelling (chronic RUE lymphedema) present. No tenderness or deformity. Normal range of motion.      Cervical back: Normal range of motion and neck supple. No rigidity or tenderness.      Right lower leg: Edema (trace, chronic) present.      Left lower leg: Edema (trace, chronic) present.      Comments: Left > right edema LE   Lymphadenopathy:      Cervical: No cervical adenopathy.   Skin:     General: Skin is warm and dry.      Coloration: Skin is not jaundiced or pale.      Findings: No erythema or rash.   Neurological:      General: No focal deficit present.      Mental Status: He is alert and oriented to person, place, and time. Mental status is at baseline.      Cranial Nerves: No cranial nerve deficit.      Sensory: No sensory deficit.      Motor: No weakness or abnormal muscle tone.      Coordination: Coordination normal.      Gait: Gait normal.      Deep Tendon Reflexes: Reflexes are normal and symmetric. Reflexes normal.   Psychiatric:         Mood and Affect: Mood normal.         Behavior: Behavior normal.         Thought Content: Thought content normal.         Judgment: Judgment normal.       Labs- reviewed  Assessment:       Problem List Items Addressed This Visit     Uncontrolled type 2 diabetes mellitus with hyperglycemia    Neuropathy    Malignant neoplasm involving both nipple and areola of right breast in male, estrogen receptor negative - Primary    Lymphedema of right upper extremity    Leukopenia due to antineoplastic chemotherapy    Class 3 severe obesity due to excess calories with serious comorbidity and body mass index (BMI) of 50.0 to 59.9 in adult    Bone metastases    Anemia associated with chemotherapy    Adjustment disorder with mixed anxiety and depressed mood          Plan:       DM- better control, he is monitoring well  Neuropathy- well controlled with current regimen    Metastatic breast cancer- continue current regimen  Continue Zometa- rayna  tomorrow  Tolerating well and response on scans  Scans mid June after C10    Anemia- improved, monitor    Leukopenia- monitor, ANC adequate    Lymphedema- PT referral    Route Chart for Scheduling    Med Onc Chart Routing      Follow up with physician . EP and start Cycle 11 chemo with cbc, cmp prior - EP 6/9, chemo 6/10- does needs scans prior that same week   Follow up with [facility] 2 weeks. Labs prior, cbc, cmp and chemo Cycle 10, day 1 on 5/13; 5/20 just needs cbc, cmp and chemo, 5/27 just needs cbc and cmp and chemo and zometa   Labs CBC and CMP   Lab interval:     Imaging PET scan      Pharmacy appointment    Other referrals          Treatment Plan Information   OP BREAST NAB-PACLITAXEL D1, D8, D15 + ALPELISIB    Rosa Linn MD   Upcoming Treatment Dates - OP BREAST NAB-PACLITAXEL D1, D8, D15 + ALPELISIB     4/29/2022       Chemotherapy       PACLitaxel-protein bound (ABRAXANE) 80 mg/m2 = 240 mg in 48 mL infusion  5/13/2022       Chemotherapy       PACLitaxel-protein bound (ABRAXANE) 80 mg/m2 = 240 mg in 48 mL infusion  5/20/2022       Chemotherapy       PACLitaxel-protein bound (ABRAXANE) 80 mg/m2 = 240 mg in 48 mL infusion  5/27/2022       Chemotherapy       PACLitaxel-protein bound (ABRAXANE) 80 mg/m2 = 240 mg in 48 mL infusion    Supportive Plan Information  OP FILGRASTIM 480 MCG   Michelle Grayson NP   Upcoming Treatment Dates - OP FILGRASTIM 480 MCG    No upcoming days in selected categories.    Therapy Plan Information  PORT FLUSH  Flushes  heparin, porcine (PF) 100 unit/mL injection flush 500 Units  500 Units, Intravenous, Every visit  sodium chloride 0.9% flush 10 mL  10 mL, Intravenous, Every visit    ZOLEDRONIC ACID (ZOMETA) IV  Medications  zoledronic 4 mg/100 mL infusion 4 mg  4 mg, Intravenous, Every 4 weeks  Flushes  sodium chloride 0.9% 250 mL flush bag  Intravenous, Every 4 weeks  sodium chloride 0.9% flush 10 mL  10 mL, Intravenous, Every 4 weeks  heparin, porcine (PF) 100 unit/mL injection  flush 500 Units  500 Units, Intravenous, Every 4 weeks  alteplase injection 2 mg  2 mg, Intra-Catheter, Every 4 weeks

## 2022-04-27 ENCOUNTER — TELEPHONE (OUTPATIENT)
Dept: HEMATOLOGY/ONCOLOGY | Facility: CLINIC | Age: 47
End: 2022-04-27
Payer: MEDICARE

## 2022-04-28 ENCOUNTER — OFFICE VISIT (OUTPATIENT)
Dept: HEMATOLOGY/ONCOLOGY | Facility: CLINIC | Age: 47
End: 2022-04-28
Payer: MEDICARE

## 2022-04-28 ENCOUNTER — LAB VISIT (OUTPATIENT)
Dept: LAB | Facility: HOSPITAL | Age: 47
End: 2022-04-28
Attending: INTERNAL MEDICINE
Payer: MEDICARE

## 2022-04-28 VITALS
DIASTOLIC BLOOD PRESSURE: 76 MMHG | TEMPERATURE: 98 F | BODY MASS INDEX: 40.43 KG/M2 | OXYGEN SATURATION: 96 % | WEIGHT: 315 LBS | HEART RATE: 65 BPM | RESPIRATION RATE: 16 BRPM | HEIGHT: 74 IN | SYSTOLIC BLOOD PRESSURE: 139 MMHG

## 2022-04-28 DIAGNOSIS — D70.1 LEUKOPENIA DUE TO ANTINEOPLASTIC CHEMOTHERAPY: ICD-10-CM

## 2022-04-28 DIAGNOSIS — E66.01 CLASS 3 SEVERE OBESITY DUE TO EXCESS CALORIES WITH SERIOUS COMORBIDITY AND BODY MASS INDEX (BMI) OF 50.0 TO 59.9 IN ADULT: ICD-10-CM

## 2022-04-28 DIAGNOSIS — D64.81 ANEMIA ASSOCIATED WITH CHEMOTHERAPY: ICD-10-CM

## 2022-04-28 DIAGNOSIS — T45.1X5A LEUKOPENIA DUE TO ANTINEOPLASTIC CHEMOTHERAPY: ICD-10-CM

## 2022-04-28 DIAGNOSIS — T45.1X5A ANEMIA ASSOCIATED WITH CHEMOTHERAPY: ICD-10-CM

## 2022-04-28 DIAGNOSIS — Z17.1 MALIGNANT NEOPLASM INVOLVING BOTH NIPPLE AND AREOLA OF RIGHT BREAST IN MALE, ESTROGEN RECEPTOR NEGATIVE: Primary | ICD-10-CM

## 2022-04-28 DIAGNOSIS — Z17.1 MALIGNANT NEOPLASM INVOLVING BOTH NIPPLE AND AREOLA OF RIGHT BREAST IN MALE, ESTROGEN RECEPTOR NEGATIVE: ICD-10-CM

## 2022-04-28 DIAGNOSIS — E11.65 UNCONTROLLED TYPE 2 DIABETES MELLITUS WITH HYPERGLYCEMIA: ICD-10-CM

## 2022-04-28 DIAGNOSIS — C79.51 BONE METASTASES: ICD-10-CM

## 2022-04-28 DIAGNOSIS — C50.021 MALIGNANT NEOPLASM INVOLVING BOTH NIPPLE AND AREOLA OF RIGHT BREAST IN MALE, ESTROGEN RECEPTOR NEGATIVE: ICD-10-CM

## 2022-04-28 DIAGNOSIS — G62.9 NEUROPATHY: ICD-10-CM

## 2022-04-28 DIAGNOSIS — F43.23 ADJUSTMENT DISORDER WITH MIXED ANXIETY AND DEPRESSED MOOD: ICD-10-CM

## 2022-04-28 DIAGNOSIS — I89.0 LYMPHEDEMA OF RIGHT UPPER EXTREMITY: ICD-10-CM

## 2022-04-28 DIAGNOSIS — C50.021 MALIGNANT NEOPLASM INVOLVING BOTH NIPPLE AND AREOLA OF RIGHT BREAST IN MALE, ESTROGEN RECEPTOR NEGATIVE: Primary | ICD-10-CM

## 2022-04-28 LAB
ALBUMIN SERPL BCP-MCNC: 4 G/DL (ref 3.5–5.2)
ALP SERPL-CCNC: 86 U/L (ref 55–135)
ALT SERPL W/O P-5'-P-CCNC: 23 U/L (ref 10–44)
ANION GAP SERPL CALC-SCNC: 8 MMOL/L (ref 8–16)
AST SERPL-CCNC: 18 U/L (ref 10–40)
BASOPHILS # BLD AUTO: 0.02 K/UL (ref 0–0.2)
BASOPHILS NFR BLD: 0.6 % (ref 0–1.9)
BILIRUB SERPL-MCNC: 0.9 MG/DL (ref 0.1–1)
BUN SERPL-MCNC: 12 MG/DL (ref 6–20)
CALCIUM SERPL-MCNC: 9.2 MG/DL (ref 8.7–10.5)
CHLORIDE SERPL-SCNC: 104 MMOL/L (ref 95–110)
CO2 SERPL-SCNC: 26 MMOL/L (ref 23–29)
CREAT SERPL-MCNC: 1.3 MG/DL (ref 0.5–1.4)
DIFFERENTIAL METHOD: ABNORMAL
EOSINOPHIL # BLD AUTO: 0 K/UL (ref 0–0.5)
EOSINOPHIL NFR BLD: 0.6 % (ref 0–8)
ERYTHROCYTE [DISTWIDTH] IN BLOOD BY AUTOMATED COUNT: 16.3 % (ref 11.5–14.5)
EST. GFR  (AFRICAN AMERICAN): >60 ML/MIN/1.73 M^2
EST. GFR  (NON AFRICAN AMERICAN): >60 ML/MIN/1.73 M^2
GLUCOSE SERPL-MCNC: 162 MG/DL (ref 70–110)
HCT VFR BLD AUTO: 33.9 % (ref 40–54)
HGB BLD-MCNC: 10.7 G/DL (ref 14–18)
IMM GRANULOCYTES # BLD AUTO: 0.02 K/UL (ref 0–0.04)
IMM GRANULOCYTES NFR BLD AUTO: 0.6 % (ref 0–0.5)
LYMPHOCYTES # BLD AUTO: 1.5 K/UL (ref 1–4.8)
LYMPHOCYTES NFR BLD: 43.2 % (ref 18–48)
MCH RBC QN AUTO: 25.8 PG (ref 27–31)
MCHC RBC AUTO-ENTMCNC: 31.6 G/DL (ref 32–36)
MCV RBC AUTO: 82 FL (ref 82–98)
MONOCYTES # BLD AUTO: 0.2 K/UL (ref 0.3–1)
MONOCYTES NFR BLD: 4.8 % (ref 4–15)
NEUTROPHILS # BLD AUTO: 1.7 K/UL (ref 1.8–7.7)
NEUTROPHILS NFR BLD: 50.2 % (ref 38–73)
NRBC BLD-RTO: 0 /100 WBC
PLATELET # BLD AUTO: 260 K/UL (ref 150–450)
PMV BLD AUTO: 10.4 FL (ref 9.2–12.9)
POTASSIUM SERPL-SCNC: 4.3 MMOL/L (ref 3.5–5.1)
PROT SERPL-MCNC: 7.7 G/DL (ref 6–8.4)
RBC # BLD AUTO: 4.15 M/UL (ref 4.6–6.2)
SODIUM SERPL-SCNC: 138 MMOL/L (ref 136–145)
WBC # BLD AUTO: 3.36 K/UL (ref 3.9–12.7)

## 2022-04-28 PROCEDURE — 1160F PR REVIEW ALL MEDS BY PRESCRIBER/CLIN PHARMACIST DOCUMENTED: ICD-10-PCS | Mod: CPTII,S$GLB,, | Performed by: INTERNAL MEDICINE

## 2022-04-28 PROCEDURE — 99999 PR PBB SHADOW E&M-EST. PATIENT-LVL V: ICD-10-PCS | Mod: PBBFAC,,, | Performed by: INTERNAL MEDICINE

## 2022-04-28 PROCEDURE — 99999 PR PBB SHADOW E&M-EST. PATIENT-LVL V: CPT | Mod: PBBFAC,,, | Performed by: INTERNAL MEDICINE

## 2022-04-28 PROCEDURE — 99214 OFFICE O/P EST MOD 30 MIN: CPT | Mod: S$GLB,,, | Performed by: INTERNAL MEDICINE

## 2022-04-28 PROCEDURE — 4010F PR ACE/ARB THEARPY RXD/TAKEN: ICD-10-PCS | Mod: CPTII,S$GLB,, | Performed by: INTERNAL MEDICINE

## 2022-04-28 PROCEDURE — 3075F SYST BP GE 130 - 139MM HG: CPT | Mod: CPTII,S$GLB,, | Performed by: INTERNAL MEDICINE

## 2022-04-28 PROCEDURE — 1159F MED LIST DOCD IN RCRD: CPT | Mod: CPTII,S$GLB,, | Performed by: INTERNAL MEDICINE

## 2022-04-28 PROCEDURE — 3008F BODY MASS INDEX DOCD: CPT | Mod: CPTII,S$GLB,, | Performed by: INTERNAL MEDICINE

## 2022-04-28 PROCEDURE — 3078F PR MOST RECENT DIASTOLIC BLOOD PRESSURE < 80 MM HG: ICD-10-PCS | Mod: CPTII,S$GLB,, | Performed by: INTERNAL MEDICINE

## 2022-04-28 PROCEDURE — 3075F PR MOST RECENT SYSTOLIC BLOOD PRESS GE 130-139MM HG: ICD-10-PCS | Mod: CPTII,S$GLB,, | Performed by: INTERNAL MEDICINE

## 2022-04-28 PROCEDURE — 85025 COMPLETE CBC W/AUTO DIFF WBC: CPT | Performed by: INTERNAL MEDICINE

## 2022-04-28 PROCEDURE — 3008F PR BODY MASS INDEX (BMI) DOCUMENTED: ICD-10-PCS | Mod: CPTII,S$GLB,, | Performed by: INTERNAL MEDICINE

## 2022-04-28 PROCEDURE — 3078F DIAST BP <80 MM HG: CPT | Mod: CPTII,S$GLB,, | Performed by: INTERNAL MEDICINE

## 2022-04-28 PROCEDURE — 1159F PR MEDICATION LIST DOCUMENTED IN MEDICAL RECORD: ICD-10-PCS | Mod: CPTII,S$GLB,, | Performed by: INTERNAL MEDICINE

## 2022-04-28 PROCEDURE — 1160F RVW MEDS BY RX/DR IN RCRD: CPT | Mod: CPTII,S$GLB,, | Performed by: INTERNAL MEDICINE

## 2022-04-28 PROCEDURE — 80053 COMPREHEN METABOLIC PANEL: CPT | Performed by: INTERNAL MEDICINE

## 2022-04-28 PROCEDURE — 4010F ACE/ARB THERAPY RXD/TAKEN: CPT | Mod: CPTII,S$GLB,, | Performed by: INTERNAL MEDICINE

## 2022-04-28 PROCEDURE — 36415 COLL VENOUS BLD VENIPUNCTURE: CPT | Performed by: INTERNAL MEDICINE

## 2022-04-28 PROCEDURE — 99214 PR OFFICE/OUTPT VISIT, EST, LEVL IV, 30-39 MIN: ICD-10-PCS | Mod: S$GLB,,, | Performed by: INTERNAL MEDICINE

## 2022-04-28 RX ORDER — SODIUM CHLORIDE 9 MG/ML
INJECTION, SOLUTION INTRAVENOUS CONTINUOUS
Status: CANCELLED | OUTPATIENT
Start: 2022-04-29

## 2022-04-28 RX ORDER — SODIUM CHLORIDE 0.9 % (FLUSH) 0.9 %
10 SYRINGE (ML) INJECTION
Status: CANCELLED | OUTPATIENT
Start: 2022-04-29

## 2022-04-28 RX ORDER — HEPARIN 100 UNIT/ML
500 SYRINGE INTRAVENOUS
Status: CANCELLED | OUTPATIENT
Start: 2022-04-29

## 2022-04-29 ENCOUNTER — INFUSION (OUTPATIENT)
Dept: INFUSION THERAPY | Facility: HOSPITAL | Age: 47
End: 2022-04-29
Payer: MEDICARE

## 2022-04-29 VITALS
SYSTOLIC BLOOD PRESSURE: 138 MMHG | HEIGHT: 74 IN | BODY MASS INDEX: 40.43 KG/M2 | HEART RATE: 71 BPM | DIASTOLIC BLOOD PRESSURE: 76 MMHG | WEIGHT: 315 LBS | TEMPERATURE: 98 F | OXYGEN SATURATION: 100 %

## 2022-04-29 DIAGNOSIS — C50.021 MALIGNANT NEOPLASM INVOLVING BOTH NIPPLE AND AREOLA OF RIGHT BREAST IN MALE, ESTROGEN RECEPTOR NEGATIVE: ICD-10-CM

## 2022-04-29 DIAGNOSIS — C79.51 BONE METASTASES: Primary | ICD-10-CM

## 2022-04-29 DIAGNOSIS — Z17.1 MALIGNANT NEOPLASM INVOLVING BOTH NIPPLE AND AREOLA OF RIGHT BREAST IN MALE, ESTROGEN RECEPTOR NEGATIVE: ICD-10-CM

## 2022-04-29 PROCEDURE — 96413 CHEMO IV INFUSION 1 HR: CPT

## 2022-04-29 PROCEDURE — 96367 TX/PROPH/DG ADDL SEQ IV INF: CPT

## 2022-04-29 PROCEDURE — 96375 TX/PRO/DX INJ NEW DRUG ADDON: CPT

## 2022-04-29 PROCEDURE — 63600175 PHARM REV CODE 636 W HCPCS: Performed by: INTERNAL MEDICINE

## 2022-04-29 PROCEDURE — 25000003 PHARM REV CODE 250: Performed by: INTERNAL MEDICINE

## 2022-04-29 PROCEDURE — 96365 THER/PROPH/DIAG IV INF INIT: CPT

## 2022-04-29 PROCEDURE — 96360 HYDRATION IV INFUSION INIT: CPT

## 2022-04-29 RX ORDER — ZOLEDRONIC ACID 0.04 MG/ML
4 INJECTION, SOLUTION INTRAVENOUS
Status: CANCELLED | OUTPATIENT
Start: 2022-05-20

## 2022-04-29 RX ORDER — SODIUM CHLORIDE 0.9 % (FLUSH) 0.9 %
10 SYRINGE (ML) INJECTION
Status: CANCELLED | OUTPATIENT
Start: 2022-05-20

## 2022-04-29 RX ORDER — HEPARIN 100 UNIT/ML
500 SYRINGE INTRAVENOUS
Status: CANCELLED | OUTPATIENT
Start: 2022-05-20

## 2022-04-29 RX ORDER — SODIUM CHLORIDE 0.9 % (FLUSH) 0.9 %
10 SYRINGE (ML) INJECTION
Status: DISCONTINUED | OUTPATIENT
Start: 2022-04-29 | End: 2022-04-29 | Stop reason: HOSPADM

## 2022-04-29 RX ORDER — SODIUM CHLORIDE 9 MG/ML
INJECTION, SOLUTION INTRAVENOUS CONTINUOUS
Status: DISCONTINUED | OUTPATIENT
Start: 2022-04-29 | End: 2022-04-29 | Stop reason: HOSPADM

## 2022-04-29 RX ORDER — HEPARIN 100 UNIT/ML
500 SYRINGE INTRAVENOUS
Status: DISCONTINUED | OUTPATIENT
Start: 2022-04-29 | End: 2022-04-29 | Stop reason: HOSPADM

## 2022-04-29 RX ORDER — ZOLEDRONIC ACID 0.04 MG/ML
4 INJECTION, SOLUTION INTRAVENOUS
Status: COMPLETED | OUTPATIENT
Start: 2022-04-29 | End: 2022-04-29

## 2022-04-29 RX ADMIN — SODIUM CHLORIDE: 9 INJECTION, SOLUTION INTRAVENOUS at 11:04

## 2022-04-29 RX ADMIN — ZOLEDRONIC ACID 4 MG: 0.04 INJECTION, SOLUTION INTRAVENOUS at 12:04

## 2022-04-29 RX ADMIN — SODIUM CHLORIDE: 0.9 INJECTION, SOLUTION INTRAVENOUS at 11:04

## 2022-04-29 RX ADMIN — PACLITAXEL 240 MG: 100 INJECTION, POWDER, LYOPHILIZED, FOR SUSPENSION INTRAVENOUS at 12:04

## 2022-04-29 RX ADMIN — HEPARIN SODIUM (PORCINE) LOCK FLUSH IV SOLN 100 UNIT/ML 500 UNITS: 100 SOLUTION at 01:04

## 2022-04-29 NOTE — PLAN OF CARE
Pt arrived AAOx3, VSS, labs reviewed, orders signed in Dr Linn appt. Pt tolerated abraxane, zometa and hydration without issue and was discharged in stable ambulatory condition to home.

## 2022-05-03 ENCOUNTER — TELEPHONE (OUTPATIENT)
Dept: HEMATOLOGY/ONCOLOGY | Facility: CLINIC | Age: 47
End: 2022-05-03
Payer: MEDICARE

## 2022-05-05 ENCOUNTER — PATIENT MESSAGE (OUTPATIENT)
Dept: OTHER | Facility: OTHER | Age: 47
End: 2022-05-05
Payer: MEDICARE

## 2022-05-06 ENCOUNTER — TELEPHONE (OUTPATIENT)
Dept: PALLIATIVE MEDICINE | Facility: CLINIC | Age: 47
End: 2022-05-06
Payer: MEDICARE

## 2022-05-09 ENCOUNTER — TELEPHONE (OUTPATIENT)
Dept: PALLIATIVE MEDICINE | Facility: CLINIC | Age: 47
End: 2022-05-09
Payer: MEDICARE

## 2022-05-11 DIAGNOSIS — G89.3 NEOPLASM RELATED PAIN: ICD-10-CM

## 2022-05-11 DIAGNOSIS — E11.9 TYPE 2 DIABETES MELLITUS WITHOUT COMPLICATION, WITHOUT LONG-TERM CURRENT USE OF INSULIN: ICD-10-CM

## 2022-05-11 RX ORDER — HYDROCODONE BITARTRATE AND ACETAMINOPHEN 10; 325 MG/1; MG/1
1 TABLET ORAL EVERY 6 HOURS PRN
Qty: 90 TABLET | Refills: 0 | Status: CANCELLED | OUTPATIENT
Start: 2022-05-11

## 2022-05-12 ENCOUNTER — LAB VISIT (OUTPATIENT)
Dept: LAB | Facility: HOSPITAL | Age: 47
End: 2022-05-12
Attending: INTERNAL MEDICINE
Payer: MEDICARE

## 2022-05-12 ENCOUNTER — OFFICE VISIT (OUTPATIENT)
Dept: HEMATOLOGY/ONCOLOGY | Facility: CLINIC | Age: 47
End: 2022-05-12
Payer: MEDICARE

## 2022-05-12 VITALS
SYSTOLIC BLOOD PRESSURE: 130 MMHG | TEMPERATURE: 98 F | WEIGHT: 315 LBS | RESPIRATION RATE: 16 BRPM | HEIGHT: 74 IN | HEART RATE: 69 BPM | OXYGEN SATURATION: 98 % | DIASTOLIC BLOOD PRESSURE: 60 MMHG | BODY MASS INDEX: 40.43 KG/M2

## 2022-05-12 DIAGNOSIS — C79.51 BONE METASTASES: ICD-10-CM

## 2022-05-12 DIAGNOSIS — Z17.1 MALIGNANT NEOPLASM INVOLVING BOTH NIPPLE AND AREOLA OF RIGHT BREAST IN MALE, ESTROGEN RECEPTOR NEGATIVE: ICD-10-CM

## 2022-05-12 DIAGNOSIS — G62.9 NEUROPATHY: ICD-10-CM

## 2022-05-12 DIAGNOSIS — G89.3 NEOPLASM RELATED PAIN: ICD-10-CM

## 2022-05-12 DIAGNOSIS — D64.81 ANEMIA ASSOCIATED WITH CHEMOTHERAPY: ICD-10-CM

## 2022-05-12 DIAGNOSIS — T45.1X5A ANEMIA ASSOCIATED WITH CHEMOTHERAPY: ICD-10-CM

## 2022-05-12 DIAGNOSIS — C50.021 MALIGNANT NEOPLASM INVOLVING BOTH NIPPLE AND AREOLA OF RIGHT BREAST IN MALE, ESTROGEN RECEPTOR NEGATIVE: ICD-10-CM

## 2022-05-12 DIAGNOSIS — C50.021 MALIGNANT NEOPLASM INVOLVING BOTH NIPPLE AND AREOLA OF RIGHT BREAST IN MALE, ESTROGEN RECEPTOR NEGATIVE: Primary | ICD-10-CM

## 2022-05-12 DIAGNOSIS — Z17.1 MALIGNANT NEOPLASM INVOLVING BOTH NIPPLE AND AREOLA OF RIGHT BREAST IN MALE, ESTROGEN RECEPTOR NEGATIVE: Primary | ICD-10-CM

## 2022-05-12 DIAGNOSIS — E11.9 TYPE 2 DIABETES MELLITUS WITHOUT COMPLICATION, UNSPECIFIED WHETHER LONG TERM INSULIN USE: ICD-10-CM

## 2022-05-12 LAB
ALBUMIN SERPL BCP-MCNC: 3.7 G/DL (ref 3.5–5.2)
ALP SERPL-CCNC: 85 U/L (ref 55–135)
ALT SERPL W/O P-5'-P-CCNC: 15 U/L (ref 10–44)
ANION GAP SERPL CALC-SCNC: 8 MMOL/L (ref 8–16)
AST SERPL-CCNC: 17 U/L (ref 10–40)
BASOPHILS # BLD AUTO: 0.01 K/UL (ref 0–0.2)
BASOPHILS NFR BLD: 0.2 % (ref 0–1.9)
BILIRUB SERPL-MCNC: 0.6 MG/DL (ref 0.1–1)
BUN SERPL-MCNC: 15 MG/DL (ref 6–20)
CALCIUM SERPL-MCNC: 8.8 MG/DL (ref 8.7–10.5)
CHLORIDE SERPL-SCNC: 107 MMOL/L (ref 95–110)
CO2 SERPL-SCNC: 24 MMOL/L (ref 23–29)
CREAT SERPL-MCNC: 1.3 MG/DL (ref 0.5–1.4)
DIFFERENTIAL METHOD: ABNORMAL
EOSINOPHIL # BLD AUTO: 0 K/UL (ref 0–0.5)
EOSINOPHIL NFR BLD: 0.7 % (ref 0–8)
ERYTHROCYTE [DISTWIDTH] IN BLOOD BY AUTOMATED COUNT: 16.5 % (ref 11.5–14.5)
EST. GFR  (AFRICAN AMERICAN): >60 ML/MIN/1.73 M^2
EST. GFR  (NON AFRICAN AMERICAN): >60 ML/MIN/1.73 M^2
GLUCOSE SERPL-MCNC: 89 MG/DL (ref 70–110)
HCT VFR BLD AUTO: 33.8 % (ref 40–54)
HGB BLD-MCNC: 10.5 G/DL (ref 14–18)
IMM GRANULOCYTES # BLD AUTO: 0.02 K/UL (ref 0–0.04)
IMM GRANULOCYTES NFR BLD AUTO: 0.5 % (ref 0–0.5)
LYMPHOCYTES # BLD AUTO: 1.1 K/UL (ref 1–4.8)
LYMPHOCYTES NFR BLD: 28.1 % (ref 18–48)
MCH RBC QN AUTO: 25.9 PG (ref 27–31)
MCHC RBC AUTO-ENTMCNC: 31.1 G/DL (ref 32–36)
MCV RBC AUTO: 84 FL (ref 82–98)
MONOCYTES # BLD AUTO: 0.5 K/UL (ref 0.3–1)
MONOCYTES NFR BLD: 11.9 % (ref 4–15)
NEUTROPHILS # BLD AUTO: 2.4 K/UL (ref 1.8–7.7)
NEUTROPHILS NFR BLD: 58.6 % (ref 38–73)
NRBC BLD-RTO: 0 /100 WBC
PLATELET # BLD AUTO: 213 K/UL (ref 150–450)
PMV BLD AUTO: 10.1 FL (ref 9.2–12.9)
POTASSIUM SERPL-SCNC: 4 MMOL/L (ref 3.5–5.1)
PROT SERPL-MCNC: 6.7 G/DL (ref 6–8.4)
RBC # BLD AUTO: 4.05 M/UL (ref 4.6–6.2)
SODIUM SERPL-SCNC: 139 MMOL/L (ref 136–145)
WBC # BLD AUTO: 4.05 K/UL (ref 3.9–12.7)

## 2022-05-12 PROCEDURE — 1159F MED LIST DOCD IN RCRD: CPT | Mod: CPTII,S$GLB,, | Performed by: NURSE PRACTITIONER

## 2022-05-12 PROCEDURE — 3075F SYST BP GE 130 - 139MM HG: CPT | Mod: CPTII,S$GLB,, | Performed by: NURSE PRACTITIONER

## 2022-05-12 PROCEDURE — 3078F DIAST BP <80 MM HG: CPT | Mod: CPTII,S$GLB,, | Performed by: NURSE PRACTITIONER

## 2022-05-12 PROCEDURE — 80053 COMPREHEN METABOLIC PANEL: CPT | Performed by: INTERNAL MEDICINE

## 2022-05-12 PROCEDURE — 3078F PR MOST RECENT DIASTOLIC BLOOD PRESSURE < 80 MM HG: ICD-10-PCS | Mod: CPTII,S$GLB,, | Performed by: NURSE PRACTITIONER

## 2022-05-12 PROCEDURE — 1159F PR MEDICATION LIST DOCUMENTED IN MEDICAL RECORD: ICD-10-PCS | Mod: CPTII,S$GLB,, | Performed by: NURSE PRACTITIONER

## 2022-05-12 PROCEDURE — 85025 COMPLETE CBC W/AUTO DIFF WBC: CPT | Performed by: INTERNAL MEDICINE

## 2022-05-12 PROCEDURE — 99499 RISK ADDL DX/OHS AUDIT: ICD-10-PCS | Mod: S$GLB,,, | Performed by: NURSE PRACTITIONER

## 2022-05-12 PROCEDURE — 3008F PR BODY MASS INDEX (BMI) DOCUMENTED: ICD-10-PCS | Mod: CPTII,S$GLB,, | Performed by: NURSE PRACTITIONER

## 2022-05-12 PROCEDURE — 99999 PR PBB SHADOW E&M-EST. PATIENT-LVL IV: ICD-10-PCS | Mod: PBBFAC,,, | Performed by: NURSE PRACTITIONER

## 2022-05-12 PROCEDURE — 99499 UNLISTED E&M SERVICE: CPT | Mod: S$GLB,,, | Performed by: NURSE PRACTITIONER

## 2022-05-12 PROCEDURE — 4010F PR ACE/ARB THEARPY RXD/TAKEN: ICD-10-PCS | Mod: CPTII,S$GLB,, | Performed by: NURSE PRACTITIONER

## 2022-05-12 PROCEDURE — 4010F ACE/ARB THERAPY RXD/TAKEN: CPT | Mod: CPTII,S$GLB,, | Performed by: NURSE PRACTITIONER

## 2022-05-12 PROCEDURE — 99999 PR PBB SHADOW E&M-EST. PATIENT-LVL IV: CPT | Mod: PBBFAC,,, | Performed by: NURSE PRACTITIONER

## 2022-05-12 PROCEDURE — 3008F BODY MASS INDEX DOCD: CPT | Mod: CPTII,S$GLB,, | Performed by: NURSE PRACTITIONER

## 2022-05-12 PROCEDURE — 3075F PR MOST RECENT SYSTOLIC BLOOD PRESS GE 130-139MM HG: ICD-10-PCS | Mod: CPTII,S$GLB,, | Performed by: NURSE PRACTITIONER

## 2022-05-12 PROCEDURE — 99215 OFFICE O/P EST HI 40 MIN: CPT | Mod: S$GLB,,, | Performed by: NURSE PRACTITIONER

## 2022-05-12 PROCEDURE — 99215 PR OFFICE/OUTPT VISIT, EST, LEVL V, 40-54 MIN: ICD-10-PCS | Mod: S$GLB,,, | Performed by: NURSE PRACTITIONER

## 2022-05-12 PROCEDURE — 36415 COLL VENOUS BLD VENIPUNCTURE: CPT | Performed by: INTERNAL MEDICINE

## 2022-05-12 RX ORDER — HEPARIN 100 UNIT/ML
500 SYRINGE INTRAVENOUS
Status: CANCELLED | OUTPATIENT
Start: 2022-05-27

## 2022-05-12 RX ORDER — SODIUM CHLORIDE 9 MG/ML
INJECTION, SOLUTION INTRAVENOUS CONTINUOUS
Status: CANCELLED | OUTPATIENT
Start: 2022-05-27

## 2022-05-12 RX ORDER — SODIUM CHLORIDE 9 MG/ML
INJECTION, SOLUTION INTRAVENOUS CONTINUOUS
Status: CANCELLED | OUTPATIENT
Start: 2022-05-20

## 2022-05-12 RX ORDER — SODIUM CHLORIDE 0.9 % (FLUSH) 0.9 %
10 SYRINGE (ML) INJECTION
Status: CANCELLED | OUTPATIENT
Start: 2022-05-20

## 2022-05-12 RX ORDER — SODIUM CHLORIDE 0.9 % (FLUSH) 0.9 %
10 SYRINGE (ML) INJECTION
Status: CANCELLED | OUTPATIENT
Start: 2022-05-13

## 2022-05-12 RX ORDER — HYDROCODONE BITARTRATE AND ACETAMINOPHEN 10; 325 MG/1; MG/1
1 TABLET ORAL EVERY 6 HOURS PRN
Qty: 90 TABLET | Refills: 0 | Status: SHIPPED | OUTPATIENT
Start: 2022-05-12 | End: 2022-06-06 | Stop reason: SDUPTHER

## 2022-05-12 RX ORDER — SODIUM CHLORIDE 9 MG/ML
INJECTION, SOLUTION INTRAVENOUS CONTINUOUS
Status: CANCELLED | OUTPATIENT
Start: 2022-05-13

## 2022-05-12 RX ORDER — HEPARIN 100 UNIT/ML
500 SYRINGE INTRAVENOUS
Status: CANCELLED | OUTPATIENT
Start: 2022-05-13

## 2022-05-12 RX ORDER — SODIUM CHLORIDE 0.9 % (FLUSH) 0.9 %
10 SYRINGE (ML) INJECTION
Status: CANCELLED | OUTPATIENT
Start: 2022-05-27

## 2022-05-12 RX ORDER — LANCETS 33 GAUGE
1 EACH MISCELLANEOUS DAILY
Qty: 100 EACH | Refills: 3 | Status: SHIPPED | OUTPATIENT
Start: 2022-05-12

## 2022-05-12 RX ORDER — HEPARIN 100 UNIT/ML
500 SYRINGE INTRAVENOUS
Status: CANCELLED | OUTPATIENT
Start: 2022-05-20

## 2022-05-12 NOTE — TELEPHONE ENCOUNTER
Care Due:                  Date            Visit Type   Department     Provider  --------------------------------------------------------------------------------                                EP [facility]  Last Visit: 04-      CARE ([facility])   KADEN LAFLEUR                              EP -                              PRIMARY      [facility]  Next Visit: 06-      CARE ([facility])   KADEN LAFLEUR                                                            Last  Test          Frequency    Reason                     Performed    Due Date  --------------------------------------------------------------------------------    HBA1C.......  6 months...  insulin..................  05-   11-    [facility] Embedded Care Gaps. Reference number: [id number]. 5/11/2022   8:11:12 PM CDT

## 2022-05-12 NOTE — PROGRESS NOTES
Subjective:       Patient ID: Paul Li Jr. is a 46 y.o. male.    Chief Complaint: Malignant neoplasm involving both nipple and areola of righ    HPI     Returns for C10D1 (Abraxane and PO Aplelisib)    Overall feels well. No new issues.   Spine tender if sits for long periods at school (seats without cushion)  This is no worse than usual and pain pill relieves. He is requiring 2-3 a day.   No other issues or complaints.   Sugars improved. 130-170's at home.   Enjoying school.   Neuropathy same.   Appetite good and weight stable.   Body readjusted after resuming aplelisib- missed a week and when resumed he developed diarrhea but better now- back to baseline  Leg swelling improved with compression hose         Most recent Imagin/15/2022 PET Scan:  FINDINGS:  Quality of the study: Adequate.  In the head and neck, there are no hypermetabolic lesions worrisome for malignancy. There are no hypermetabolic mucosal lesions, and there are no pathologically enlarged or hypermetabolic lymph nodes.  In the chest, there are no hypermetabolic lesions worrisome for malignancy.  There are no pathologically enlarged or hypermetabolic lymph nodes.  Stable right lung pulmonary micronodules measuring 0.4 cm (series 2, image 83), below the size threshold for PET. No new pulmonary nodule or mass.  Postoperative changes of right mastectomy and axillary dissection.  In the abdomen and pelvis, there is physiologic tracer distribution within the abdominal organs and excretion into the genitourinary system.  In the bones, there is normalized uptake within prior hypermetabolic osseous lesions which demonstrate progressed sclerosis on CT.  Prominent sclerosis involving the T12 vertebral body, sacrum, and iliac bone.  Mild uptake within the left pubic symphysis without correlate lesion on CT, potentially representing physiologic marrow uptake.  In the extremities, there are no hypermetabolic lesions worrisome for malignancy.  Incidental  CT findings: Sinus disease.  Left-sided chest port with catheter tip terminating in the SVC.  Diffuse hypoattenuation of the liver compatible with hepatic steatosis.  Impression:  Favorable response to therapy with normalized uptake and increased sclerosis of osseous lesions.  No new hypermetabolic tumor.  Stable right lung pulmonary micronodules, below the size threshold for PET.  Attention on follow-up.     Diagnosis:  Metastatic TNBC progressed (prior Stage IB ([id number]) triple negative invasive ductal carcinoma with lobular features of right breast, retroareolar, Grade 2, Ki67 80%, diagnosed 2019)     Oncology History:  Metastatic  Set up for scans (due to back pain) which are consistent for recurrence.   Imagin/3/2021 MRI T/L spine:  FINDINGS:  Alignment: Within normal limits.  Vertebrae: Low T1 signal intensity in the left T12 vertebral body extending to the left pedicle, S1 and S2 vertebral bodies with abnormal enhancement.  The vertebral heights are well maintained.  No evidence of acute fractures.  Abnormal appearance of the LEFT sacrum and ilium as well.  Discs: Degenerative disease of the level of L4-L5 with posterior annular fissure.  Conus appears within normal limits terminating at the level of the L2. level.  Cauda equina appears unremarkable.  Mild circumferential disc bulging at the level of T10-T11 abutting the ventral thecal sac.  No significant spinal canal stenosis or neural foraminal narrowing throughout the thoracic spine.  No significant spinal canal stenosis or neural foraminal narrowing throughout the lumbar spine.  Paraspinous muscles and soft tissues: Subcentimeter focal enhancement in the posterior column along the supraspinous ligament at the level of L1-L2 (sagittal series 21, image 10) potentially inflammatory versus neoplastic.  Impression:  Abnormal appearance of the T12, S1, S2 vertebral bodies as well as LEFT sacrum and ilium concerning for metastatic disease in this patient  with history of breast cancer.  No evidence for significant central canal narrowing.  Additional findings, as above.  This report was flagged in Epic as abnormal.     6/9/2021 PET scan:  COMPARISON:  MRI thoracic and lumbar spine 06/03/2021  FINDINGS:  Quality of the study: Adequate.  In the head and neck, there are no hypermetabolic lesions worrisome for malignancy. There are no hypermetabolic mucosal lesions, and there are no pathologically enlarged or hypermetabolic lymph nodes.  In the chest, there is postoperative change of right mastectomy/right axillary lymph node dissection.  There is low level hypermetabolic activity with mild soft tissue thickening in the skin/superficial subcutaneous fat of the right chest wall (axial fused image 78) with SUV max 3.6.  There is soft tissue thickening measuring approximately 1.8 x 7.9 cm in the subcutaneous fat of the right chest wall (axial series 3, image 84) with SUV max 3.1.  There are a few bilateral pulmonary nodules measuring up to 0.3 cm in the left lung (axial series 3, image 88) and 0.5 cm in the right lung (axial series 3, image 84).  These nodules are too small to characterize by PET.  There are no pathologically enlarged or hypermetabolic lymph nodes.  In the abdomen and pelvis, there is physiologic tracer distribution within the abdominal organs and excretion into the genitourinary system.  Subtle sen mesentery and multiple prominent mesenteric lymph nodes measuring up to 1.8 cm in long axis with background activity.  In the bones, there are several hypermetabolic lesions worrisome for malignancy.  Sclerotic lesion in the left T12 vertebral body (axial series 3, image 131) with SUV max 12.  Sclerotic lesions in the sacrum (for example axial series 3, image 188) with SUV max 9.7.  Sclerotic lesions in the left iliac bone (for example axial series 3, image 205) with SUV max 6.  In the extremities, there are no hypermetabolic lesions worrisome for  malignancy.  Incidental findings:  Bilateral maxillary antra mucous retention cysts.  Fat containing right inguinal hernia.  Impression:  1. In this patient with right breast carcinoma status post mastectomy/right axillary lymph node dissection, there are multiple sclerotic lesions in the T12 vertebral body, sacrum, and left iliac bone with hypermetabolic activity concerning for active metastatic disease and in keeping with findings on MRI 06/03/2021.  2. Additionally there is low-level hypermetabolic activity with soft tissue thickening in the right chest wall as detailed above.  These findings are nonspecific and may be related to postoperative/post radiation change, with recurrent malignancy not excluded.  3. Nonspecific mesenteric haziness and lymph nodes which may indicate mesenteric adenitis.  Recommend clinical correlation and attention on follow-up.  4. Additional findings as above.  I, Sohan Tillman MD, attest that I reviewed and interpreted the images.     In summary,   Started aplelisib 8/22/2021   Abraxane C1 started 8/13/2021  We had sent Guardant and discussed results with Molecular tumor board  He also had a repeat bx to confirm metastatic triple negative breast cancer  Plan:   Nab-Paclitaxel and Alpelisib  Reference: https://ascopubs.org/doi/abs/10.1200/JCO.[email or web address]_suppl.1018  Also on Zometa-      - C1D1 Abraxane 8/13/2021  - Started aplelisib 8/22/2021               Oncology History   Malignant neoplasm involving both nipple and areola of right breast in male, estrogen receptor negative   5/1/2019 Imaging Significant Findings     Mammogram and US  Impression:  Right  Mass: Right breast 14 mm mass at the retroareolar anterior position. Assessment: 4 - Suspicious finding. Biopsy is recommended.   Left  There is no mammographic or sonographic evidence of malignancy.  Recommendation:  Biopsy is recommended.      5/2/2019 Biopsy     BREAST, RIGHT, RETROAREOLAR MASS, ULTRASOUND-GUIDED BIOPSY:  Invasive  ductal carcinoma with lobular features.  Size of invasive carcinoma: 5 MM in greatest linear dimension within a single      5/2/2019 Breast Tumor Markers     Estrogen: Negative  Progesterone: Negative  HER2: Negative  Ki67: 80      5/17/2019 Cancer Staged     Staging form: Breast, AJCC 8th Edition  - Clinical stage from 5/17/2019: Stage IB (cT1c, cN0, cM0, G2, ER-, NJ-, HER2-) - Signed by Richa Bahena MD on 5/17/2019 5/27/2019 - 7/21/2019 Chemotherapy     Treatment Summary   Plan Name: OP BREAST DOSE-DENSE AC - DOXORUBICIN CYCLOPHOSPHAMIDE Q2W  Treatment Goal: Curative  Status: Inactive  Start Date: 5/27/2019  End Date: 7/9/2019  Provider: Richa Bahena MD  Chemotherapy: DOXOrubicin chemo injection 186 mg, 60 mg/m2 = 186 mg, Intravenous, Clinic/HOD 1 time, 4 of 4 cycles  Administration: 186 mg (5/27/2019), 186 mg (6/10/2019), 186 mg (6/24/2019), 186 mg (7/8/2019)  cyclophosphamide (CYTOXAN) 600 mg/m2 = 1,865 mg in sodium chloride 0.9% 250 mL chemo infusion, 600 mg/m2 = 1,865 mg, Intravenous, Clinic/HOD 1 time, 4 of 4 cycles  Administration: 1,865 mg (5/27/2019), 1,865 mg (6/10/2019), 1,865 mg (6/24/2019), 1,865 mg (7/8/2019)      7/22/2019 - 10/15/2019 Chemotherapy     Treatment Summary   Plan Name: OP PACLITAXEL QW  Treatment Goal: Curative  Status: Inactive  Start Date: 7/24/2019  End Date: 10/8/2019  Provider: Richa Bahena MD  Chemotherapy: PACLitaxel (TAXOL) 80 mg/m2 = 246 mg in sodium chloride 0.9% 250 mL chemo infusion, 80 mg/m2 = 246 mg, Intravenous, Clinic/HOD 1 time, 12 of 12 cycles  Dose modification: 60 mg/m2 (original dose 80 mg/m2, Cycle 3), 55 mg/m2 (original dose 80 mg/m2, Cycle 7), 60 mg/m2 (original dose 80 mg/m2, Cycle 7), 50 mg/m2 (original dose 80 mg/m2, Cycle 9), 50 mg/m2 (original dose 80 mg/m2, Cycle 10)  Administration: 246 mg (7/24/2019), 246 mg (7/31/2019), 186 mg (8/7/2019), 186 mg (8/14/2019), 186 mg (8/21/2019), 186 mg (8/28/2019), 186 mg (9/4/2019), 174 mg  (9/11/2019), 156 mg (9/18/2019), 156 mg (9/25/2019), 156 mg (10/1/2019), 156 mg (10/8/2019)      11/22/2019 Breast Surgery     On 11/22/19 Dr whyte performed a right breast mastectomy with SLN biopsy with pathology showing grade 2, 6 mm IDC with extensive treatment effect, no LVI with 1 LN positive 4 mm, negative margins. The on 12/17/19, Dr Whyte performed right axillary dissection with pathology still pending.      11/22/2019 Cancer Staged     ypT1b N1      12/17/2019 Breast Surgery     Surgery: Dr Shima Whyte, [phone] performed right ALND with pathology showing 14 negative lymph nodes.             1/14/2020 Tumor Conference      Post mastectomy radiation therapy: Xeloda, then possible Keytruda trial .      2/3/2020 - 3/23/2020 Radiation Therapy     Treating physician: Speedy Nair MD   Total Dose: 50.4 Gy to the chest wall and regional lymphatics  10 Gy boost to the prostate.   Fractions: 28 fractions at 1.8 Gy per fraction  5 fractions at 2 Gy per fraction       2/28/2020 -  Chemotherapy     * 4/15/2020 - 9/28/20 completed 6 cycles adjuvant Xeloda 1000 mg/m2 bid days 1-14 every 21 days  Treatment Summary   Plan Name: OP CAPECITABINE 1250MG/M2 BID Q3W  Treatment Goal: Curative  Status: Active  Start Date: 3/20/2020  End Date: 3/20/2020  Provider: Richa Bahena MD  Chemotherapy: [No matching medication found in this treatment plan]         Review of Systems   Constitutional:        See above   All other systems reviewed and are negative.        Objective:      Physical Exam  Vitals and nursing note reviewed.   Constitutional:       General: He is not in acute distress.     Appearance: Normal appearance. He is well-developed. He is not diaphoretic.      Comments: Presents alone  Very pleasant  ECOG= 0   HENT:      Head: Normocephalic and atraumatic.   Eyes:      General: No scleral icterus.     Extraocular Movements: Extraocular movements intact.      Conjunctiva/sclera: Conjunctivae normal.       Pupils: Pupils are equal, round, and reactive to light.   Neck:      Thyroid: No thyromegaly.      Trachea: No tracheal deviation.   Cardiovascular:      Rate and Rhythm: Normal rate and regular rhythm.      Heart sounds: Normal heart sounds. No murmur heard.    No friction rub. No gallop.   Pulmonary:      Effort: Pulmonary effort is normal. No respiratory distress.      Breath sounds: Normal breath sounds. No wheezing, rhonchi or rales.      Comments: Slightly distant posteriorly R>L.   Chest:      Chest wall: No tenderness.   Abdominal:      General: Bowel sounds are normal. There is no distension.      Palpations: Abdomen is soft. There is no mass.      Tenderness: There is no abdominal tenderness. There is no guarding or rebound.      Comments: No organomegaly   Musculoskeletal:         General: Swelling (chronic RUE lymphedema) present. No tenderness or deformity. Normal range of motion.      Cervical back: Normal range of motion and neck supple. No rigidity or tenderness.      Right lower leg: Edema (trace, chronic) present.      Left lower leg: Edema (trace, chronic) present.      Comments: Left > right edema LE  No spinal or paraspinal tenderness   Lymphadenopathy:      Cervical: No cervical adenopathy.   Skin:     General: Skin is warm and dry.      Coloration: Skin is not jaundiced or pale.      Findings: No erythema or rash.   Neurological:      General: No focal deficit present.      Mental Status: He is alert and oriented to person, place, and time. Mental status is at baseline.      Cranial Nerves: No cranial nerve deficit.      Sensory: No sensory deficit.      Motor: No weakness or abnormal muscle tone.      Coordination: Coordination normal.      Gait: Gait normal.      Deep Tendon Reflexes: Reflexes are normal and symmetric. Reflexes normal.   Psychiatric:         Mood and Affect: Mood normal.         Behavior: Behavior normal.         Thought Content: Thought content normal.         Judgment:  Judgment normal.       Labs- reviewed  Assessment:       1. Malignant neoplasm involving both nipple and areola of right breast in male, estrogen receptor negative     2. Bone metastases     3. Anemia associated with chemotherapy     4. Type 2 diabetes mellitus without complication, unspecified whether long term insulin use     5. Neuropathy     6. Neoplasm related pain         Plan:         1-2. Overall doing well.   Labs reviewed and adequate.   Proceed with chemo today- C10D1 Abraxane  Continue Piqray  Zometa monthly - not due today  3. Stable parameters- monitor.   4. Continue current regimen- adequate control.   5. Stable- grd 1  6. Continue Norco    Cc chart previously sent.   Patient is in agreement with the proposed treatment plan. All questions were answered to the patient's satisfaction. Pt knows to call clinic for any new or worsening symptoms and if anything is needed before the next clinic visit.      Richard Keys, KARIP-C  Hematology & Oncology  [facility] [place] [address]  ph. [phone]  Fax. [phone]     Face to Face time with patient: 30 minutes  40  minutes of total time spent on the encounter, which includes face to face time and non-face to face time preparing to see the patient (eg, review of tests), Obtaining and/or reviewing separately obtained history, Documenting clinical information in the electronic or other health record, Independently interpreting results (not separately reported) and communicating results to the patient/family/caregiver, or Care coordination (not separately reported).

## 2022-05-12 NOTE — TELEPHONE ENCOUNTER
Refill Routing Note   Medication(s) are not appropriate for processing by [facility] for the following reason(s):      - Patient has been seen in the ED/Hospital since the last PCP visit    [facility] action(s):  Defer Medication-related problems identified: Requires labs        Medication reconciliation completed: No     Appointments  past 12m or future 3m with PCP    Date Provider   Last Visit   4/15/2021 Kareem Arroyo MD   Next Visit   6/1/2022 Kareem Arroyo MD   ED visits in past 90 days: 0        Note composed:8:31 PM 05/11/2022

## 2022-05-13 ENCOUNTER — INFUSION (OUTPATIENT)
Dept: INFUSION THERAPY | Facility: HOSPITAL | Age: 47
End: 2022-05-13
Payer: MEDICARE

## 2022-05-13 ENCOUNTER — SPECIALTY PHARMACY (OUTPATIENT)
Dept: PHARMACY | Facility: CLINIC | Age: 47
End: 2022-05-13
Payer: MEDICARE

## 2022-05-13 VITALS
BODY MASS INDEX: 43.96 KG/M2 | TEMPERATURE: 98 F | SYSTOLIC BLOOD PRESSURE: 126 MMHG | RESPIRATION RATE: 18 BRPM | DIASTOLIC BLOOD PRESSURE: 66 MMHG | WEIGHT: 315 LBS | HEART RATE: 66 BPM

## 2022-05-13 DIAGNOSIS — N52.8 OTHER MALE ERECTILE DYSFUNCTION: ICD-10-CM

## 2022-05-13 DIAGNOSIS — C50.021 MALIGNANT NEOPLASM INVOLVING BOTH NIPPLE AND AREOLA OF RIGHT BREAST IN MALE, ESTROGEN RECEPTOR NEGATIVE: Primary | ICD-10-CM

## 2022-05-13 DIAGNOSIS — Z17.1 MALIGNANT NEOPLASM INVOLVING BOTH NIPPLE AND AREOLA OF RIGHT BREAST IN MALE, ESTROGEN RECEPTOR NEGATIVE: Primary | ICD-10-CM

## 2022-05-13 PROCEDURE — 96413 CHEMO IV INFUSION 1 HR: CPT

## 2022-05-13 PROCEDURE — 96361 HYDRATE IV INFUSION ADD-ON: CPT

## 2022-05-13 PROCEDURE — 25000003 PHARM REV CODE 250: Performed by: INTERNAL MEDICINE

## 2022-05-13 PROCEDURE — 63600175 PHARM REV CODE 636 W HCPCS: Performed by: INTERNAL MEDICINE

## 2022-05-13 PROCEDURE — A4216 STERILE WATER/SALINE, 10 ML: HCPCS | Performed by: INTERNAL MEDICINE

## 2022-05-13 RX ORDER — SODIUM CHLORIDE 9 MG/ML
INJECTION, SOLUTION INTRAVENOUS CONTINUOUS
Status: DISCONTINUED | OUTPATIENT
Start: 2022-05-13 | End: 2022-05-13 | Stop reason: HOSPADM

## 2022-05-13 RX ORDER — TADALAFIL 5 MG/1
10 TABLET ORAL DAILY PRN
Qty: 20 TABLET | Refills: 1 | Status: CANCELLED | OUTPATIENT
Start: 2022-05-13 | End: 2023-05-13

## 2022-05-13 RX ORDER — SODIUM CHLORIDE 0.9 % (FLUSH) 0.9 %
10 SYRINGE (ML) INJECTION
Status: DISCONTINUED | OUTPATIENT
Start: 2022-05-13 | End: 2022-05-13 | Stop reason: HOSPADM

## 2022-05-13 RX ORDER — HEPARIN 100 UNIT/ML
500 SYRINGE INTRAVENOUS
Status: DISCONTINUED | OUTPATIENT
Start: 2022-05-13 | End: 2022-05-13 | Stop reason: HOSPADM

## 2022-05-13 RX ADMIN — HEPARIN SODIUM (PORCINE) LOCK FLUSH IV SOLN 100 UNIT/ML 500 UNITS: 100 SOLUTION at 04:05

## 2022-05-13 RX ADMIN — Medication 10 ML: at 04:05

## 2022-05-13 RX ADMIN — SODIUM CHLORIDE: 0.9 INJECTION, SOLUTION INTRAVENOUS at 03:05

## 2022-05-13 RX ADMIN — PACLITAXEL 240 MG: 100 INJECTION, POWDER, LYOPHILIZED, FOR SUSPENSION INTRAVENOUS at 03:05

## 2022-05-13 NOTE — TELEPHONE ENCOUNTER
Specialty Pharmacy - Refill Coordination    Specialty Medication Orders Linked to Encounter    Flowsheet Row Most Recent Value   Medication #1 alpelisib 300 mg/day (150 mg x 2) Tab (Order#[id number], Rx#[id number])          Refill Questions - Documented Responses    Flowsheet Row Most Recent Value   Patient Availability and HIPAA Verification    Does patient want to proceed with activity? Yes   HIPAA/medical authority confirmed? Yes   Relationship to patient of person spoken to? Self   Refill Screening Questions    Changes to allergies? No   Changes to medications? No   New conditions since last clinic visit? No   Unplanned office visit, urgent care, ED, or hospital admission in the last 4 weeks? No   How does patient/caregiver feel medication is working? Good   Financial problems or insurance changes? No   How many doses of your specialty medications were missed in the last 4 weeks? 1   Would patient like to speak to a pharmacist? No   When does the patient need to receive the medication? 05/22/22   Refill Delivery Questions    How will the patient receive the medication? Delivery [facility]   When does the patient need to receive the medication? 05/22/22   Shipping Address Home   Address in [facility] confirmed and updated if neccessary? Yes   Expected Copay ($) 0   Is the patient able to afford the medication copay? Yes   Payment Method zero copay   Days supply of Refill 28   Supplies needed? No supplies needed   Refill activity completed? Yes   Refill activity plan Refill scheduled   Shipment/Pickup Date: 05/18/22          Current Outpatient Medications   Medication Sig    alpelisib 300 mg/day (150 mg x 2) Tab Take 2 tablets (300 mg) by mouth once daily.    ALPRAZolam (XANAX) 0.5 MG tablet Take 1 tablet (0.5 mg total) by mouth 3 (three) times daily as needed for Insomnia or Anxiety.    amLODIPine (NORVASC) 10 MG tablet TAKE 1 TABLET (10 MG) BY MOUTH EVERY DAY    calcium-vitamin D3 (OYSTER SHELL CALCIUM-VIT  "D3) 500 mg-5 mcg (200 unit) per tablet Take 1 tablet by mouth 2 (two) times daily.    diphenoxylate-atropine 2.5-0.025 mg (LOMOTIL) 2.5-0.025 mg per tablet Take 1 tablet by mouth 4 (four) times daily as needed for Diarrhea.    dulaglutide (TRULICITY) 1.5 mg/0.5 mL PnIj Inject 1.5 mg into the skin once a week. Trulicity 0.75mg weekly for 4 weeks & then increase to 1.5 mg weekly indefinitely. (Patient taking differently: Inject 1.5 mg into the skin once a week. Trulicity 0.75mg weekly for 4 weeks & then increase to 1.5 mg weekly indefinitely.(PATIENT TAKES ON SUNDAYS))    FLUoxetine 20 MG capsule Take 2 capsules (40 mg total) by mouth once daily.    gabapentin (NEURONTIN) 300 MG capsule Take 1 capsule (300 mg total) by mouth 3 (three) times daily.    hydroCHLOROthiazide (HYDRODIURIL) 25 MG tablet TAKE 1 TABLET BY MOUTH ONCE DAILY    HYDROcodone-acetaminophen (NORCO)  mg per tablet Take 1 tablet by mouth every 6 (six) hours as needed for Pain.    insulin detemir U-100 (LEVEMIR FLEXTOUCH U-100 INSULN) 100 unit/mL (3 mL) InPn pen Inject 21 Units into the skin every evening.    lancets 33 gauge Misc 1 lancet by Misc.(Non-Drug; Combo Route) route once daily.    lancing device Misc 1 Device by Misc.(Non-Drug; Combo Route) route 2 (two) times daily with meals.    LIDOcaine-prilocaine (EMLA) cream Apply topically as needed.    losartan (COZAAR) 50 MG tablet Take 2 tablets by mouth once daily    metFORMIN (GLUCOPHAGE) 500 MG tablet Take 2 tablets (1,000 mg total) by mouth 2 (two) times daily with meals.    ondansetron (ZOFRAN-ODT) 8 MG TbDL Take 1 tablet (8 mg total) by mouth every 8 (eight) hours as needed (nausea).    pen needle, diabetic (BD ULTRA-FINE TANESHA PEN NEEDLE) 32 gauge x 5/32" Ndle Use as directed with novolog and levemir    tadalafiL (CIALIS) 5 MG tablet Take 2 tablets (10 mg total) by mouth daily as needed for Erectile Dysfunction.   Last reviewed on 5/13/2022  2:43 PM by Shefali Mata, " RN    Review of patient's allergies indicates:  No Known Allergies Last reviewed on  5/13/2022 2:43 PM by Shefali Mata      Tasks added this encounter   6/12/2022 - Refill Call (Auto Added)   Tasks due within next 3 months   No tasks due.     Corina Montero, Patient Care Assistant  [facility]  [address] [place], [place] [address]  [facility] [address]  Phone: [phone]  Fax: [phone]

## 2022-05-13 NOTE — PLAN OF CARE
1614 pt tolerated normal saline and abraxane infuison without issue, pt to rtc 5/20/22, no distress noted upon d/c to home

## 2022-05-13 NOTE — PLAN OF CARE
1500 pt here for D1C10 Abraxane infusion , labs, hx, meds, allergies reviewed, pt with no new complaints at this time, reclined in chair, continue to monitor

## 2022-05-14 ENCOUNTER — PES CALL (OUTPATIENT)
Dept: ADMINISTRATIVE | Facility: CLINIC | Age: 47
End: 2022-05-14
Payer: MEDICARE

## 2022-05-14 DIAGNOSIS — N52.8 OTHER MALE ERECTILE DYSFUNCTION: ICD-10-CM

## 2022-05-16 RX ORDER — TADALAFIL 5 MG/1
10 TABLET ORAL DAILY PRN
Qty: 20 TABLET | Refills: 1 | Status: SHIPPED | OUTPATIENT
Start: 2022-05-16 | End: 2022-11-16 | Stop reason: SDUPTHER

## 2022-05-19 ENCOUNTER — PATIENT OUTREACH (OUTPATIENT)
Dept: ADMINISTRATIVE | Facility: HOSPITAL | Age: 47
End: 2022-05-19
Payer: MEDICARE

## 2022-05-19 ENCOUNTER — PATIENT MESSAGE (OUTPATIENT)
Dept: ADMINISTRATIVE | Facility: HOSPITAL | Age: 47
End: 2022-05-19
Payer: MEDICARE

## 2022-05-19 ENCOUNTER — LAB VISIT (OUTPATIENT)
Dept: LAB | Facility: HOSPITAL | Age: 47
End: 2022-05-19
Attending: INTERNAL MEDICINE
Payer: MEDICARE

## 2022-05-19 DIAGNOSIS — Z17.1 MALIGNANT NEOPLASM INVOLVING BOTH NIPPLE AND AREOLA OF RIGHT BREAST IN MALE, ESTROGEN RECEPTOR NEGATIVE: ICD-10-CM

## 2022-05-19 DIAGNOSIS — E11.9 DIABETES MELLITUS WITHOUT COMPLICATION: Primary | ICD-10-CM

## 2022-05-19 DIAGNOSIS — C50.021 MALIGNANT NEOPLASM INVOLVING BOTH NIPPLE AND AREOLA OF RIGHT BREAST IN MALE, ESTROGEN RECEPTOR NEGATIVE: ICD-10-CM

## 2022-05-19 LAB
ALBUMIN SERPL BCP-MCNC: 4 G/DL (ref 3.5–5.2)
ALP SERPL-CCNC: 91 U/L (ref 55–135)
ALT SERPL W/O P-5'-P-CCNC: 22 U/L (ref 10–44)
ANION GAP SERPL CALC-SCNC: 10 MMOL/L (ref 8–16)
AST SERPL-CCNC: 23 U/L (ref 10–40)
BASOPHILS # BLD AUTO: 0.02 K/UL (ref 0–0.2)
BASOPHILS NFR BLD: 0.6 % (ref 0–1.9)
BILIRUB SERPL-MCNC: 0.9 MG/DL (ref 0.1–1)
BUN SERPL-MCNC: 20 MG/DL (ref 6–20)
CALCIUM SERPL-MCNC: 9.2 MG/DL (ref 8.7–10.5)
CHLORIDE SERPL-SCNC: 106 MMOL/L (ref 95–110)
CO2 SERPL-SCNC: 25 MMOL/L (ref 23–29)
CREAT SERPL-MCNC: 1.3 MG/DL (ref 0.5–1.4)
DIFFERENTIAL METHOD: ABNORMAL
EOSINOPHIL # BLD AUTO: 0 K/UL (ref 0–0.5)
EOSINOPHIL NFR BLD: 0.3 % (ref 0–8)
ERYTHROCYTE [DISTWIDTH] IN BLOOD BY AUTOMATED COUNT: 16.4 % (ref 11.5–14.5)
EST. GFR  (AFRICAN AMERICAN): >60 ML/MIN/1.73 M^2
EST. GFR  (NON AFRICAN AMERICAN): >60 ML/MIN/1.73 M^2
GLUCOSE SERPL-MCNC: 124 MG/DL (ref 70–110)
HCT VFR BLD AUTO: 35.1 % (ref 40–54)
HGB BLD-MCNC: 11.2 G/DL (ref 14–18)
IMM GRANULOCYTES # BLD AUTO: 0.01 K/UL (ref 0–0.04)
IMM GRANULOCYTES NFR BLD AUTO: 0.3 % (ref 0–0.5)
LYMPHOCYTES # BLD AUTO: 1.6 K/UL (ref 1–4.8)
LYMPHOCYTES NFR BLD: 45.9 % (ref 18–48)
MCH RBC QN AUTO: 25.3 PG (ref 27–31)
MCHC RBC AUTO-ENTMCNC: 31.9 G/DL (ref 32–36)
MCV RBC AUTO: 79 FL (ref 82–98)
MONOCYTES # BLD AUTO: 0.3 K/UL (ref 0.3–1)
MONOCYTES NFR BLD: 8.7 % (ref 4–15)
NEUTROPHILS # BLD AUTO: 1.5 K/UL (ref 1.8–7.7)
NEUTROPHILS NFR BLD: 44.2 % (ref 38–73)
NRBC BLD-RTO: 0 /100 WBC
PLATELET # BLD AUTO: 194 K/UL (ref 150–450)
PMV BLD AUTO: 10.1 FL (ref 9.2–12.9)
POTASSIUM SERPL-SCNC: 4.1 MMOL/L (ref 3.5–5.1)
PROT SERPL-MCNC: 7.7 G/DL (ref 6–8.4)
RBC # BLD AUTO: 4.43 M/UL (ref 4.6–6.2)
SODIUM SERPL-SCNC: 141 MMOL/L (ref 136–145)
WBC # BLD AUTO: 3.44 K/UL (ref 3.9–12.7)

## 2022-05-19 PROCEDURE — 80053 COMPREHEN METABOLIC PANEL: CPT | Performed by: INTERNAL MEDICINE

## 2022-05-19 PROCEDURE — 36415 COLL VENOUS BLD VENIPUNCTURE: CPT | Performed by: INTERNAL MEDICINE

## 2022-05-19 PROCEDURE — 85025 COMPLETE CBC W/AUTO DIFF WBC: CPT | Performed by: INTERNAL MEDICINE

## 2022-05-19 NOTE — PROGRESS NOTES
2022 Care Everywhere updates requested and reviewed.  Immunizations reconciled. Media reports reviewed.  Duplicate HM overrides and  orders removed.  Overdue HM topic chart audit and/or requested.  Overdue lab testing linked to upcoming lab appointments if applies.        ORDERS PLACED AND LINKED    Health Maintenance Due   Topic Date Due    Hepatitis C Screening  Never done    Pneumococcal Vaccines (Age 0-64) (1 - PCV) Never done    Low Dose Statin  Never done    Foot Exam  2020    Diabetes Urine Screening  2021    COVID-19 Vaccine (2 - Booster for Karely series) 2021    Hemoglobin A1c  11/10/2021    Lipid Panel  2021

## 2022-05-20 ENCOUNTER — INFUSION (OUTPATIENT)
Dept: INFUSION THERAPY | Facility: HOSPITAL | Age: 47
End: 2022-05-20
Payer: MEDICARE

## 2022-05-20 VITALS
RESPIRATION RATE: 18 BRPM | SYSTOLIC BLOOD PRESSURE: 145 MMHG | HEART RATE: 68 BPM | DIASTOLIC BLOOD PRESSURE: 76 MMHG | TEMPERATURE: 98 F

## 2022-05-20 DIAGNOSIS — Z17.1 MALIGNANT NEOPLASM INVOLVING BOTH NIPPLE AND AREOLA OF RIGHT BREAST IN MALE, ESTROGEN RECEPTOR NEGATIVE: Primary | ICD-10-CM

## 2022-05-20 DIAGNOSIS — C50.021 MALIGNANT NEOPLASM INVOLVING BOTH NIPPLE AND AREOLA OF RIGHT BREAST IN MALE, ESTROGEN RECEPTOR NEGATIVE: Primary | ICD-10-CM

## 2022-05-20 PROCEDURE — 96361 HYDRATE IV INFUSION ADD-ON: CPT

## 2022-05-20 PROCEDURE — 63600175 PHARM REV CODE 636 W HCPCS: Mod: JG | Performed by: INTERNAL MEDICINE

## 2022-05-20 PROCEDURE — 96413 CHEMO IV INFUSION 1 HR: CPT

## 2022-05-20 PROCEDURE — A4216 STERILE WATER/SALINE, 10 ML: HCPCS | Performed by: INTERNAL MEDICINE

## 2022-05-20 PROCEDURE — 25000003 PHARM REV CODE 250: Performed by: INTERNAL MEDICINE

## 2022-05-20 RX ORDER — SODIUM CHLORIDE 0.9 % (FLUSH) 0.9 %
10 SYRINGE (ML) INJECTION
Status: DISCONTINUED | OUTPATIENT
Start: 2022-05-20 | End: 2022-05-20 | Stop reason: HOSPADM

## 2022-05-20 RX ORDER — SODIUM CHLORIDE 9 MG/ML
INJECTION, SOLUTION INTRAVENOUS CONTINUOUS
Status: DISCONTINUED | OUTPATIENT
Start: 2022-05-20 | End: 2022-05-20 | Stop reason: HOSPADM

## 2022-05-20 RX ORDER — HEPARIN 100 UNIT/ML
500 SYRINGE INTRAVENOUS
Status: DISCONTINUED | OUTPATIENT
Start: 2022-05-20 | End: 2022-05-20 | Stop reason: HOSPADM

## 2022-05-20 RX ADMIN — Medication 10 ML: at 05:05

## 2022-05-20 RX ADMIN — HEPARIN 500 UNITS: 100 SYRINGE at 05:05

## 2022-05-20 RX ADMIN — SODIUM CHLORIDE: 0.9 INJECTION, SOLUTION INTRAVENOUS at 03:05

## 2022-05-20 RX ADMIN — PACLITAXEL 240 MG: 100 INJECTION, POWDER, LYOPHILIZED, FOR SUSPENSION INTRAVENOUS at 04:05

## 2022-05-20 NOTE — PLAN OF CARE
[facility] to [facility] 5/27/22, no distress noted upon d/c to home

## 2022-05-20 NOTE — PLAN OF CARE
1520 pt here for Abraxane infusion D8C10, labs, hx, meds, allergies reviewed, pt with no new complaints at this time, reclined in chair, continue to monitor

## 2022-05-25 ENCOUNTER — TELEPHONE (OUTPATIENT)
Dept: HEMATOLOGY/ONCOLOGY | Facility: CLINIC | Age: 47
End: 2022-05-25
Payer: MEDICARE

## 2022-05-25 ENCOUNTER — PATIENT MESSAGE (OUTPATIENT)
Dept: HEMATOLOGY/ONCOLOGY | Facility: CLINIC | Age: 47
End: 2022-05-25
Payer: MEDICARE

## 2022-05-25 ENCOUNTER — TELEPHONE (OUTPATIENT)
Dept: INFUSION THERAPY | Facility: HOSPITAL | Age: 47
End: 2022-05-25
Payer: MEDICARE

## 2022-05-25 DIAGNOSIS — R45.89 ANXIETY ABOUT HEALTH: Primary | ICD-10-CM
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
## 2022-05-26 ENCOUNTER — LAB VISIT (OUTPATIENT)
Dept: LAB | Facility: HOSPITAL | Age: 47
End: 2022-05-26
Attending: INTERNAL MEDICINE
Payer: MEDICARE

## 2022-05-26 ENCOUNTER — PATIENT MESSAGE (OUTPATIENT)
Dept: OTHER | Facility: OTHER | Age: 47
End: 2022-05-26
Payer: MEDICARE

## 2022-05-26 DIAGNOSIS — E11.9 DIABETES MELLITUS WITHOUT COMPLICATION: ICD-10-CM

## 2022-05-26 LAB
CHOLEST SERPL-MCNC: 127 MG/DL (ref 120–199)
CHOLEST/HDLC SERPL: 3 {RATIO} (ref 2–5)
ESTIMATED AVG GLUCOSE: 134 MG/DL (ref 68–131)
HBA1C MFR BLD: 6.3 % (ref 4–5.6)
HDLC SERPL-MCNC: 43 MG/DL (ref 40–75)
HDLC SERPL: 33.9 % (ref 20–50)
LDLC SERPL CALC-MCNC: 72.8 MG/DL (ref 63–159)
NONHDLC SERPL-MCNC: 84 MG/DL
TRIGL SERPL-MCNC: 56 MG/DL (ref 30–150)

## 2022-05-26 PROCEDURE — 36415 COLL VENOUS BLD VENIPUNCTURE: CPT | Performed by: FAMILY MEDICINE

## 2022-05-26 PROCEDURE — 83036 HEMOGLOBIN GLYCOSYLATED A1C: CPT | Performed by: FAMILY MEDICINE

## 2022-05-26 PROCEDURE — 80061 LIPID PANEL: CPT | Performed by: FAMILY MEDICINE

## 2022-05-27 ENCOUNTER — INFUSION (OUTPATIENT)
Dept: INFUSION THERAPY | Facility: HOSPITAL | Age: 47
End: 2022-05-27
Payer: MEDICARE

## 2022-05-27 VITALS
SYSTOLIC BLOOD PRESSURE: 136 MMHG | OXYGEN SATURATION: 99 % | TEMPERATURE: 98 F | WEIGHT: 315 LBS | BODY MASS INDEX: 40.43 KG/M2 | DIASTOLIC BLOOD PRESSURE: 60 MMHG | RESPIRATION RATE: 18 BRPM | HEART RATE: 66 BPM | HEIGHT: 74 IN

## 2022-05-27 DIAGNOSIS — C50.021 MALIGNANT NEOPLASM INVOLVING BOTH NIPPLE AND AREOLA OF RIGHT BREAST IN MALE, ESTROGEN RECEPTOR NEGATIVE: Primary | ICD-10-CM

## 2022-05-27 DIAGNOSIS — Z17.1 MALIGNANT NEOPLASM INVOLVING BOTH NIPPLE AND AREOLA OF RIGHT BREAST IN MALE, ESTROGEN RECEPTOR NEGATIVE: Primary | ICD-10-CM

## 2022-05-27 DIAGNOSIS — C79.51 BONE METASTASES: ICD-10-CM

## 2022-05-27 LAB
ALBUMIN SERPL BCP-MCNC: 3.7 G/DL (ref 3.5–5.2)
ALP SERPL-CCNC: 79 U/L (ref 55–135)
ALT SERPL W/O P-5'-P-CCNC: 18 U/L (ref 10–44)
ANION GAP SERPL CALC-SCNC: 7 MMOL/L (ref 8–16)
AST SERPL-CCNC: 16 U/L (ref 10–40)
BASOPHILS # BLD AUTO: 0.02 K/UL (ref 0–0.2)
BASOPHILS NFR BLD: 0.6 % (ref 0–1.9)
BILIRUB SERPL-MCNC: 0.7 MG/DL (ref 0.1–1)
BUN SERPL-MCNC: 17 MG/DL (ref 6–20)
CALCIUM SERPL-MCNC: 9.1 MG/DL (ref 8.7–10.5)
CHLORIDE SERPL-SCNC: 103 MMOL/L (ref 95–110)
CO2 SERPL-SCNC: 27 MMOL/L (ref 23–29)
CREAT SERPL-MCNC: 1.5 MG/DL (ref 0.5–1.4)
DIFFERENTIAL METHOD: ABNORMAL
EOSINOPHIL # BLD AUTO: 0 K/UL (ref 0–0.5)
EOSINOPHIL NFR BLD: 0.8 % (ref 0–8)
ERYTHROCYTE [DISTWIDTH] IN BLOOD BY AUTOMATED COUNT: 16.6 % (ref 11.5–14.5)
EST. GFR  (AFRICAN AMERICAN): >60 ML/MIN/1.73 M^2
EST. GFR  (NON AFRICAN AMERICAN): 55.8 ML/MIN/1.73 M^2
GLUCOSE SERPL-MCNC: 158 MG/DL (ref 70–110)
HCT VFR BLD AUTO: 32 % (ref 40–54)
HGB BLD-MCNC: 10 G/DL (ref 14–18)
IMM GRANULOCYTES # BLD AUTO: 0.03 K/UL (ref 0–0.04)
IMM GRANULOCYTES NFR BLD AUTO: 0.8 % (ref 0–0.5)
LYMPHOCYTES # BLD AUTO: 1.3 K/UL (ref 1–4.8)
LYMPHOCYTES NFR BLD: 37 % (ref 18–48)
MCH RBC QN AUTO: 25.4 PG (ref 27–31)
MCHC RBC AUTO-ENTMCNC: 31.3 G/DL (ref 32–36)
MCV RBC AUTO: 81 FL (ref 82–98)
MONOCYTES # BLD AUTO: 0.2 K/UL (ref 0.3–1)
MONOCYTES NFR BLD: 5.6 % (ref 4–15)
NEUTROPHILS # BLD AUTO: 2 K/UL (ref 1.8–7.7)
NEUTROPHILS NFR BLD: 55.2 % (ref 38–73)
NRBC BLD-RTO: 0 /100 WBC
PLATELET # BLD AUTO: 229 K/UL (ref 150–450)
PMV BLD AUTO: 10.5 FL (ref 9.2–12.9)
POTASSIUM SERPL-SCNC: 3.7 MMOL/L (ref 3.5–5.1)
PROT SERPL-MCNC: 7.1 G/DL (ref 6–8.4)
RBC # BLD AUTO: 3.93 M/UL (ref 4.6–6.2)
SODIUM SERPL-SCNC: 137 MMOL/L (ref 136–145)
WBC # BLD AUTO: 3.54 K/UL (ref 3.9–12.7)

## 2022-05-27 PROCEDURE — 36415 COLL VENOUS BLD VENIPUNCTURE: CPT | Performed by: NURSE PRACTITIONER

## 2022-05-27 PROCEDURE — 80053 COMPREHEN METABOLIC PANEL: CPT | Performed by: NURSE PRACTITIONER

## 2022-05-27 PROCEDURE — 96367 TX/PROPH/DG ADDL SEQ IV INF: CPT

## 2022-05-27 PROCEDURE — 25000003 PHARM REV CODE 250: Performed by: INTERNAL MEDICINE

## 2022-05-27 PROCEDURE — 85025 COMPLETE CBC W/AUTO DIFF WBC: CPT | Performed by: NURSE PRACTITIONER

## 2022-05-27 PROCEDURE — 63600175 PHARM REV CODE 636 W HCPCS: Performed by: INTERNAL MEDICINE

## 2022-05-27 PROCEDURE — 96413 CHEMO IV INFUSION 1 HR: CPT

## 2022-05-27 PROCEDURE — A4216 STERILE WATER/SALINE, 10 ML: HCPCS | Performed by: INTERNAL MEDICINE

## 2022-05-27 PROCEDURE — 96360 HYDRATION IV INFUSION INIT: CPT

## 2022-05-27 PROCEDURE — 96361 HYDRATE IV INFUSION ADD-ON: CPT

## 2022-05-27 RX ORDER — SODIUM CHLORIDE 0.9 % (FLUSH) 0.9 %
10 SYRINGE (ML) INJECTION
Status: CANCELLED | OUTPATIENT
Start: 2022-06-24

## 2022-05-27 RX ORDER — ZOLEDRONIC ACID 0.04 MG/ML
4 INJECTION, SOLUTION INTRAVENOUS
Status: CANCELLED | OUTPATIENT
Start: 2022-06-24

## 2022-05-27 RX ORDER — SODIUM CHLORIDE 0.9 % (FLUSH) 0.9 %
10 SYRINGE (ML) INJECTION
Status: DISCONTINUED | OUTPATIENT
Start: 2022-05-27 | End: 2022-05-27 | Stop reason: HOSPADM

## 2022-05-27 RX ORDER — HEPARIN 100 UNIT/ML
500 SYRINGE INTRAVENOUS
Status: DISCONTINUED | OUTPATIENT
Start: 2022-05-27 | End: 2022-05-27 | Stop reason: HOSPADM

## 2022-05-27 RX ORDER — HEPARIN 100 UNIT/ML
500 SYRINGE INTRAVENOUS
Status: CANCELLED | OUTPATIENT
Start: 2022-06-24

## 2022-05-27 RX ORDER — ZOLEDRONIC ACID 0.04 MG/ML
4 INJECTION, SOLUTION INTRAVENOUS
Status: COMPLETED | OUTPATIENT
Start: 2022-05-27 | End: 2022-05-27

## 2022-05-27 RX ORDER — SODIUM CHLORIDE 9 MG/ML
INJECTION, SOLUTION INTRAVENOUS CONTINUOUS
Status: DISCONTINUED | OUTPATIENT
Start: 2022-05-27 | End: 2022-05-27 | Stop reason: HOSPADM

## 2022-05-27 RX ADMIN — PACLITAXEL 240 MG: 100 INJECTION, POWDER, LYOPHILIZED, FOR SUSPENSION INTRAVENOUS at 05:05

## 2022-05-27 RX ADMIN — SODIUM CHLORIDE: 0.9 INJECTION, SOLUTION INTRAVENOUS at 03:05

## 2022-05-27 RX ADMIN — HEPARIN 500 UNITS: 100 SYRINGE at 06:05

## 2022-05-27 RX ADMIN — ZOLEDRONIC ACID 4 MG: 0.04 INJECTION, SOLUTION INTRAVENOUS at 03:05

## 2022-05-27 RX ADMIN — Medication 10 ML: at 06:05

## 2022-05-27 NOTE — PLAN OF CARE
Pt received Abraxane, Zometa & IVFs today and tolerated well, without complications. Educated patient about Abraxane, Zometa & IVFs (indications, side effects, possible reactions, chemotherapy precautions) and verbalized understanding.  VSS. CW port positive for blood return, saline flushed, Heparin flush instilled to dwell and de accessed prior to DC. Pt DC with no distress noted, ambulated off unit, w/o event, via self, pleased.

## 2022-06-02 ENCOUNTER — OFFICE VISIT (OUTPATIENT)
Dept: PSYCHIATRY | Facility: CLINIC | Age: 47
End: 2022-06-02
Payer: COMMERCIAL

## 2022-06-02 ENCOUNTER — TELEPHONE (OUTPATIENT)
Dept: INFUSION THERAPY | Facility: HOSPITAL | Age: 47
End: 2022-06-02
Payer: MEDICARE

## 2022-06-02 DIAGNOSIS — F43.23 ADJUSTMENT DISORDER WITH MIXED ANXIETY AND DEPRESSED MOOD: Primary | ICD-10-CM

## 2022-06-02 PROCEDURE — 99211 OFF/OP EST MAY X REQ PHY/QHP: CPT | Mod: PBBFAC | Performed by: PSYCHOLOGIST

## 2022-06-02 PROCEDURE — 90834 PSYTX W PT 45 MINUTES: CPT | Mod: S$GLB,,, | Performed by: PSYCHOLOGIST

## 2022-06-02 PROCEDURE — 4010F PR ACE/ARB THEARPY RXD/TAKEN: ICD-10-PCS | Mod: CPTII,S$GLB,, | Performed by: PSYCHOLOGIST

## 2022-06-02 PROCEDURE — 1160F PR REVIEW ALL MEDS BY PRESCRIBER/CLIN PHARMACIST DOCUMENTED: ICD-10-PCS | Mod: CPTII,S$GLB,, | Performed by: PSYCHOLOGIST

## 2022-06-02 PROCEDURE — 90834 PR PSYCHOTHERAPY W/PATIENT, 45 MIN: ICD-10-PCS | Mod: S$GLB,,, | Performed by: PSYCHOLOGIST

## 2022-06-02 PROCEDURE — 1159F PR MEDICATION LIST DOCUMENTED IN MEDICAL RECORD: ICD-10-PCS | Mod: CPTII,S$GLB,, | Performed by: PSYCHOLOGIST

## 2022-06-02 PROCEDURE — 1160F RVW MEDS BY RX/DR IN RCRD: CPT | Mod: CPTII,S$GLB,, | Performed by: PSYCHOLOGIST

## 2022-06-02 PROCEDURE — 99999 PR PBB SHADOW E&M-EST. PATIENT-LVL I: ICD-10-PCS | Mod: PBBFAC,,, | Performed by: PSYCHOLOGIST

## 2022-06-02 PROCEDURE — 4010F ACE/ARB THERAPY RXD/TAKEN: CPT | Mod: CPTII,S$GLB,, | Performed by: PSYCHOLOGIST

## 2022-06-02 PROCEDURE — 99999 PR PBB SHADOW E&M-EST. PATIENT-LVL I: CPT | Mod: PBBFAC,,, | Performed by: PSYCHOLOGIST

## 2022-06-02 PROCEDURE — 1159F MED LIST DOCD IN RCRD: CPT | Mod: CPTII,S$GLB,, | Performed by: PSYCHOLOGIST

## 2022-06-02 PROCEDURE — 3044F HG A1C LEVEL LT 7.0%: CPT | Mod: CPTII,S$GLB,, | Performed by: PSYCHOLOGIST

## 2022-06-02 PROCEDURE — 3044F PR MOST RECENT HEMOGLOBIN A1C LEVEL <7.0%: ICD-10-PCS | Mod: CPTII,S$GLB,, | Performed by: PSYCHOLOGIST

## 2022-06-04 DIAGNOSIS — C80.1 ANXIETY ASSOCIATED WITH CANCER DIAGNOSIS: ICD-10-CM

## 2022-06-04 DIAGNOSIS — E11.65 UNCONTROLLED TYPE 2 DIABETES MELLITUS WITH HYPERGLYCEMIA: ICD-10-CM

## 2022-06-04 DIAGNOSIS — F41.1 ANXIETY ASSOCIATED WITH CANCER DIAGNOSIS: ICD-10-CM

## 2022-06-04 DIAGNOSIS — F43.23 ADJUSTMENT DISORDER WITH MIXED ANXIETY AND DEPRESSED MOOD: ICD-10-CM

## 2022-06-05 NOTE — PROGRESS NOTES
"PSYCHO-ONCOLOGY NOTE/ Individual Psychotherapy     Date: 6/2/2022   Site:  [facility]         Therapeutic Intervention: Met with patient.  Outpatient - Behavior modifying psychotherapy 45 min - CPT code 01987    Referring provider:  Kute intially, now Nodurft    Chief complaint/reason for encounter: Met with patient to evaluate psychosocial adaptation to survivorship of metastatic breast cancer  The patient's last visit with me was on 1/13/2022.      Objective:    Pual Li Jr. arrived promptly for the session.  Mr. Li was independently ambulatory at the time of session. The patient was fully cooperative throughout the session.  Appearance: age appropriate, casually  dressed, well groomed  Behavior/Cooperation: friendly and cooperative  Speech: normal in rate, volume, and tone and appropriate quality, quantity and organization of sentences  Mood: dysthymic  Affect: mood congruent, full range and appropriate  Thought Process: goal-directed, logical  Thought Content: normal,  No delusions or paranoia; did not appear to be responding to internal stimuli during the session  Orientation: grossly intact  Memory: Grossly intact  Attention Span/Concentration: Attends to session without distraction; reports no difficulty  Fund of Knowledge: average  Estimate of Intelligence: average from verbal skills and history  Cognition: grossly intact  Insight: patient has awareness of illness; adequate insight into own behavior and behavior of others  Judgment: the patient's behavior is adequate to circumstances      Interval history and content of current session: Patient discussed increase in social stressors since last visit.  His apartment complex has become more dangerous. He is having a return of discord with his wife after 2 months of good functioning ("she stopped doing things with me again, sleeping all day, only spends time with the baby"). Discussed past benefit of open conversation (and reviewed DEAR " "process). He is tempted to leave ("but what if my sons hate me?")    He believes his youngest son may be having another child (they live with patient and his wife).      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 (But home problems are distracting him). He is helping to start up a new [employer] (and will be a ). Discussed current adaptation to disease and treatment status. Reports to be coping in an adequate manner.  ("My wife is more of a problem than the cancer.") Discussed patient's tendency to "live only one scan at a time."    Nutrition:  Patient is excited about his ongoing weight loss, but recognizes that it is due to his current medication regimen.     His prozac was increased to 40 mg on 2/2022. He feels it has improved his mood. Possibility of further dose increase explored.         Risk parameters:   Patient reports no suicidal ideation  Patient reports no homicidal ideation  Patient reports no self-injurious behavior  Patient reports no violent behavior   Safety needs:  None at this time      Verbal deficits: None     Patient's response to intervention:The patient's response to intervention is accepting, motivated.     Progress toward goals and other mental status changes:  The patient's progress toward goals is good.      Progress to date:Progress as Expected      Goals from last visit: Met      Patient reported outcomes:      Distress Thermometer:   Distress Score    Distress Score: 6        Practical Problems Physical Problems                                                   Family Problems                                         Emotional Problems                                                         Spiritual/Religions Concerns               Other Problems              PHQ-9= 3; Initial visit: 8       BILL-7=13; Initial visit: 8     GAD7 12/21/2020   1. Feeling nervous, anxious, or on edge? 0   2. Not being able to stop or control worrying? 0   3. Worrying " too much about different things? 0   4. Trouble relaxing? 1   5. Being so restless that it is hard to sit still? 0   6. Becoming easily annoyed or irritable? 1   7. Feeling afraid as if something awful might happen? 0   BILL-7 Score 2         Client Strengths: verbal, intelligent, successful, good social support, commitment to wellness, strong guadalupe, strong cultural traditions      Treatment Plan:individual psychotherapy  · Target symptoms: adjustment  · Why chosen therapy is appropriate versus another modality: relevant to diagnosis, patient responds to this modality, evidence based practice  · Outcome monitoring methods: self-report, observation, checklist/rating scale  · Therapeutic intervention type: behavior modifying psychotherapy  · Prognosis: Good      Behavioral goals:    Exercise:   Stress management:     Social engagement: open conversation with wife (DEAR process)    Open discussion with son about finances, etc.    Plan October cruise   Nutrition:    Smoking Cessation:   Therapy:  consider marital therapy    Consider increased antidepressant discussion with MDs?      Next visit- reassess sleep- CPAP use? Sleep medicine follow-up?    Return to clinic: as needed     Length of Service (minutes direct face-to-face contact): 45      ICD-10-CM ICD-9-CM   1. Adjustment disorder with mixed anxiety and depressed mood  F43.23 [phone]         Matthew Sandoval, PhD  [place] License #[id number]

## 2022-06-06 ENCOUNTER — HOSPITAL ENCOUNTER (OUTPATIENT)
Dept: RADIOLOGY | Facility: HOSPITAL | Age: 47
Discharge: HOME | End: 2022-06-06
Attending: INTERNAL MEDICINE
Payer: MEDICARE

## 2022-06-06 DIAGNOSIS — G89.3 NEOPLASM RELATED PAIN: ICD-10-CM

## 2022-06-06 DIAGNOSIS — C50.021 MALIGNANT NEOPLASM INVOLVING BOTH NIPPLE AND AREOLA OF RIGHT BREAST IN MALE, ESTROGEN RECEPTOR NEGATIVE: ICD-10-CM

## 2022-06-06 DIAGNOSIS — Z17.1 MALIGNANT NEOPLASM INVOLVING BOTH NIPPLE AND AREOLA OF RIGHT BREAST IN MALE, ESTROGEN RECEPTOR NEGATIVE: ICD-10-CM

## 2022-06-06 LAB — POCT GLUCOSE: 98 MG/DL (ref 70–110)

## 2022-06-06 PROCEDURE — 25500020 PHARM REV CODE 255: Performed by: INTERNAL MEDICINE

## 2022-06-06 PROCEDURE — 78815 PET IMAGE W/CT SKULL-THIGH: CPT | Mod: TC

## 2022-06-06 PROCEDURE — A9698 NON-RAD CONTRAST MATERIALNOC: HCPCS | Performed by: INTERNAL MEDICINE

## 2022-06-06 PROCEDURE — 78815 NM PET CT ROUTINE: ICD-10-PCS | Mod: 26,PS,, | Performed by: RADIOLOGY

## 2022-06-06 PROCEDURE — 78815 PET IMAGE W/CT SKULL-THIGH: CPT | Mod: 26,PS,, | Performed by: RADIOLOGY

## 2022-06-06 RX ORDER — ALPRAZOLAM 0.5 MG/1
0.5 TABLET ORAL 3 TIMES DAILY PRN
Qty: 60 TABLET | Refills: 0 | Status: SHIPPED | OUTPATIENT
Start: 2022-06-06 | End: 2022-07-30 | Stop reason: SDUPTHER

## 2022-06-06 RX ORDER — METFORMIN HYDROCHLORIDE 500 MG/1
1000 TABLET ORAL 2 TIMES DAILY WITH MEALS
Qty: 120 TABLET | Refills: 0 | Status: SHIPPED | OUTPATIENT
Start: 2022-06-06 | End: 2022-08-19 | Stop reason: SDUPTHER

## 2022-06-06 RX ORDER — FLUOXETINE HYDROCHLORIDE 20 MG/1
40 CAPSULE ORAL DAILY
Qty: 90 CAPSULE | Refills: 1 | Status: SHIPPED | OUTPATIENT
Start: 2022-06-06 | End: 2022-08-30 | Stop reason: SDUPTHER

## 2022-06-06 RX ADMIN — IOHEXOL 1000 ML: 9 SOLUTION ORAL at 01:06

## 2022-06-07 ENCOUNTER — PATIENT MESSAGE (OUTPATIENT)
Dept: HEMATOLOGY/ONCOLOGY | Facility: CLINIC | Age: 47
End: 2022-06-07
Payer: MEDICARE

## 2022-06-07 ENCOUNTER — PES CALL (OUTPATIENT)
Dept: ADMINISTRATIVE | Facility: CLINIC | Age: 47
End: 2022-06-07
Payer: MEDICARE

## 2022-06-07 ENCOUNTER — TELEPHONE (OUTPATIENT)
Dept: PALLIATIVE MEDICINE | Facility: CLINIC | Age: 47
End: 2022-06-07
Payer: MEDICARE

## 2022-06-07 ENCOUNTER — TELEPHONE (OUTPATIENT)
Dept: HEMATOLOGY/ONCOLOGY | Facility: CLINIC | Age: 47
End: 2022-06-07
Payer: MEDICARE

## 2022-06-07 RX ORDER — HYDROCODONE BITARTRATE AND ACETAMINOPHEN 10; 325 MG/1; MG/1
1 TABLET ORAL EVERY 6 HOURS PRN
Qty: 90 TABLET | Refills: 0 | Status: SHIPPED | OUTPATIENT
Start: 2022-06-07 | End: 2022-07-05 | Stop reason: SDUPTHER

## 2022-06-07 NOTE — TELEPHONE ENCOUNTER
Returned patient's call.  Informed him that I had called to inform him of rescheduled lab and office visit for this week. Unable to leave a vm earlier because mailbox was full.

## 2022-06-08 ENCOUNTER — PATIENT MESSAGE (OUTPATIENT)
Dept: HEMATOLOGY/ONCOLOGY | Facility: CLINIC | Age: 47
End: 2022-06-08
Payer: MEDICARE

## 2022-06-09 ENCOUNTER — LAB VISIT (OUTPATIENT)
Dept: LAB | Facility: HOSPITAL | Age: 47
End: 2022-06-09
Attending: INTERNAL MEDICINE
Payer: MEDICARE

## 2022-06-09 ENCOUNTER — OFFICE VISIT (OUTPATIENT)
Dept: HEMATOLOGY/ONCOLOGY | Facility: CLINIC | Age: 47
End: 2022-06-09
Payer: MEDICARE

## 2022-06-09 VITALS
WEIGHT: 315 LBS | HEART RATE: 66 BPM | HEIGHT: 74 IN | DIASTOLIC BLOOD PRESSURE: 66 MMHG | RESPIRATION RATE: 16 BRPM | OXYGEN SATURATION: 96 % | BODY MASS INDEX: 40.43 KG/M2 | SYSTOLIC BLOOD PRESSURE: 126 MMHG | TEMPERATURE: 98 F

## 2022-06-09 DIAGNOSIS — T45.1X5A ANEMIA ASSOCIATED WITH CHEMOTHERAPY: ICD-10-CM

## 2022-06-09 DIAGNOSIS — C50.021 MALIGNANT NEOPLASM INVOLVING BOTH NIPPLE AND AREOLA OF RIGHT BREAST IN MALE, ESTROGEN RECEPTOR NEGATIVE: ICD-10-CM

## 2022-06-09 DIAGNOSIS — D64.81 ANEMIA ASSOCIATED WITH CHEMOTHERAPY: ICD-10-CM

## 2022-06-09 DIAGNOSIS — Z17.1 MALIGNANT NEOPLASM INVOLVING BOTH NIPPLE AND AREOLA OF RIGHT BREAST IN MALE, ESTROGEN RECEPTOR NEGATIVE: Primary | ICD-10-CM

## 2022-06-09 DIAGNOSIS — Z17.1 MALIGNANT NEOPLASM INVOLVING BOTH NIPPLE AND AREOLA OF RIGHT BREAST IN MALE, ESTROGEN RECEPTOR NEGATIVE: ICD-10-CM

## 2022-06-09 DIAGNOSIS — C79.51 BONE METASTASES: ICD-10-CM

## 2022-06-09 DIAGNOSIS — I10 ESSENTIAL HYPERTENSION: ICD-10-CM

## 2022-06-09 DIAGNOSIS — E11.9 TYPE 2 DIABETES MELLITUS WITHOUT COMPLICATION, WITHOUT LONG-TERM CURRENT USE OF INSULIN: ICD-10-CM

## 2022-06-09 DIAGNOSIS — C50.021 MALIGNANT NEOPLASM INVOLVING BOTH NIPPLE AND AREOLA OF RIGHT BREAST IN MALE, ESTROGEN RECEPTOR NEGATIVE: Primary | ICD-10-CM

## 2022-06-09 LAB
ALBUMIN SERPL BCP-MCNC: 3.8 G/DL (ref 3.5–5.2)
ALP SERPL-CCNC: 90 U/L (ref 55–135)
ALT SERPL W/O P-5'-P-CCNC: 21 U/L (ref 10–44)
ANION GAP SERPL CALC-SCNC: 9 MMOL/L (ref 8–16)
AST SERPL-CCNC: 20 U/L (ref 10–40)
BILIRUB SERPL-MCNC: 0.6 MG/DL (ref 0.1–1)
BUN SERPL-MCNC: 24 MG/DL (ref 6–20)
CALCIUM SERPL-MCNC: 9.6 MG/DL (ref 8.7–10.5)
CHLORIDE SERPL-SCNC: 105 MMOL/L (ref 95–110)
CO2 SERPL-SCNC: 25 MMOL/L (ref 23–29)
CREAT SERPL-MCNC: 1.3 MG/DL (ref 0.5–1.4)
ERYTHROCYTE [DISTWIDTH] IN BLOOD BY AUTOMATED COUNT: 16.7 % (ref 11.5–14.5)
EST. GFR  (AFRICAN AMERICAN): >60 ML/MIN/1.73 M^2
EST. GFR  (NON AFRICAN AMERICAN): >60 ML/MIN/1.73 M^2
GLUCOSE SERPL-MCNC: 131 MG/DL (ref 70–110)
HCT VFR BLD AUTO: 35.3 % (ref 40–54)
HGB BLD-MCNC: 11.1 G/DL (ref 14–18)
IMM GRANULOCYTES # BLD AUTO: 0.03 K/UL (ref 0–0.04)
MCH RBC QN AUTO: 26.3 PG (ref 27–31)
MCHC RBC AUTO-ENTMCNC: 31.4 G/DL (ref 32–36)
MCV RBC AUTO: 84 FL (ref 82–98)
NEUTROPHILS # BLD AUTO: 3 K/UL (ref 1.8–7.7)
PLATELET # BLD AUTO: 275 K/UL (ref 150–450)
PMV BLD AUTO: 10.7 FL (ref 9.2–12.9)
POTASSIUM SERPL-SCNC: 4.3 MMOL/L (ref 3.5–5.1)
PROT SERPL-MCNC: 7.5 G/DL (ref 6–8.4)
RBC # BLD AUTO: 4.22 M/UL (ref 4.6–6.2)
SODIUM SERPL-SCNC: 139 MMOL/L (ref 136–145)
WBC # BLD AUTO: 6.08 K/UL (ref 3.9–12.7)

## 2022-06-09 PROCEDURE — 1159F PR MEDICATION LIST DOCUMENTED IN MEDICAL RECORD: ICD-10-PCS | Mod: CPTII,S$GLB,, | Performed by: INTERNAL MEDICINE

## 2022-06-09 PROCEDURE — 85027 COMPLETE CBC AUTOMATED: CPT | Performed by: INTERNAL MEDICINE

## 2022-06-09 PROCEDURE — 80053 COMPREHEN METABOLIC PANEL: CPT | Performed by: INTERNAL MEDICINE

## 2022-06-09 PROCEDURE — 3074F PR MOST RECENT SYSTOLIC BLOOD PRESSURE < 130 MM HG: ICD-10-PCS | Mod: CPTII,S$GLB,, | Performed by: INTERNAL MEDICINE

## 2022-06-09 PROCEDURE — 1160F RVW MEDS BY RX/DR IN RCRD: CPT | Mod: CPTII,S$GLB,, | Performed by: INTERNAL MEDICINE

## 2022-06-09 PROCEDURE — 1160F PR REVIEW ALL MEDS BY PRESCRIBER/CLIN PHARMACIST DOCUMENTED: ICD-10-PCS | Mod: CPTII,S$GLB,, | Performed by: INTERNAL MEDICINE

## 2022-06-09 PROCEDURE — 3044F HG A1C LEVEL LT 7.0%: CPT | Mod: CPTII,S$GLB,, | Performed by: INTERNAL MEDICINE

## 2022-06-09 PROCEDURE — 36415 COLL VENOUS BLD VENIPUNCTURE: CPT | Performed by: INTERNAL MEDICINE

## 2022-06-09 PROCEDURE — 3008F BODY MASS INDEX DOCD: CPT | Mod: CPTII,S$GLB,, | Performed by: INTERNAL MEDICINE

## 2022-06-09 PROCEDURE — 3078F PR MOST RECENT DIASTOLIC BLOOD PRESSURE < 80 MM HG: ICD-10-PCS | Mod: CPTII,S$GLB,, | Performed by: INTERNAL MEDICINE

## 2022-06-09 PROCEDURE — 99999 PR PBB SHADOW E&M-EST. PATIENT-LVL IV: ICD-10-PCS | Mod: PBBFAC,,, | Performed by: INTERNAL MEDICINE

## 2022-06-09 PROCEDURE — 3044F PR MOST RECENT HEMOGLOBIN A1C LEVEL <7.0%: ICD-10-PCS | Mod: CPTII,S$GLB,, | Performed by: INTERNAL MEDICINE

## 2022-06-09 PROCEDURE — 1159F MED LIST DOCD IN RCRD: CPT | Mod: CPTII,S$GLB,, | Performed by: INTERNAL MEDICINE

## 2022-06-09 PROCEDURE — 3074F SYST BP LT 130 MM HG: CPT | Mod: CPTII,S$GLB,, | Performed by: INTERNAL MEDICINE

## 2022-06-09 PROCEDURE — 99214 OFFICE O/P EST MOD 30 MIN: CPT | Mod: S$GLB,,, | Performed by: INTERNAL MEDICINE

## 2022-06-09 PROCEDURE — 4010F PR ACE/ARB THEARPY RXD/TAKEN: ICD-10-PCS | Mod: CPTII,S$GLB,, | Performed by: INTERNAL MEDICINE

## 2022-06-09 PROCEDURE — 3008F PR BODY MASS INDEX (BMI) DOCUMENTED: ICD-10-PCS | Mod: CPTII,S$GLB,, | Performed by: INTERNAL MEDICINE

## 2022-06-09 PROCEDURE — 99214 PR OFFICE/OUTPT VISIT, EST, LEVL IV, 30-39 MIN: ICD-10-PCS | Mod: S$GLB,,, | Performed by: INTERNAL MEDICINE

## 2022-06-09 PROCEDURE — 4010F ACE/ARB THERAPY RXD/TAKEN: CPT | Mod: CPTII,S$GLB,, | Performed by: INTERNAL MEDICINE

## 2022-06-09 PROCEDURE — 3078F DIAST BP <80 MM HG: CPT | Mod: CPTII,S$GLB,, | Performed by: INTERNAL MEDICINE

## 2022-06-09 PROCEDURE — 99999 PR PBB SHADOW E&M-EST. PATIENT-LVL IV: CPT | Mod: PBBFAC,,, | Performed by: INTERNAL MEDICINE

## 2022-06-09 RX ORDER — HEPARIN 100 UNIT/ML
500 SYRINGE INTRAVENOUS
Status: CANCELLED | OUTPATIENT
Start: 2022-07-01

## 2022-06-09 RX ORDER — SODIUM CHLORIDE 9 MG/ML
INJECTION, SOLUTION INTRAVENOUS CONTINUOUS
Status: CANCELLED | OUTPATIENT
Start: 2022-06-10

## 2022-06-09 RX ORDER — SODIUM CHLORIDE 0.9 % (FLUSH) 0.9 %
10 SYRINGE (ML) INJECTION
Status: CANCELLED | OUTPATIENT
Start: 2022-07-01

## 2022-06-09 RX ORDER — HEPARIN 100 UNIT/ML
500 SYRINGE INTRAVENOUS
Status: CANCELLED | OUTPATIENT
Start: 2022-06-24

## 2022-06-09 RX ORDER — SODIUM CHLORIDE 0.9 % (FLUSH) 0.9 %
10 SYRINGE (ML) INJECTION
Status: CANCELLED | OUTPATIENT
Start: 2022-06-24

## 2022-06-09 RX ORDER — HEPARIN 100 UNIT/ML
500 SYRINGE INTRAVENOUS
Status: CANCELLED | OUTPATIENT
Start: 2022-06-10

## 2022-06-09 RX ORDER — SODIUM CHLORIDE 9 MG/ML
INJECTION, SOLUTION INTRAVENOUS CONTINUOUS
Status: CANCELLED | OUTPATIENT
Start: 2022-07-01

## 2022-06-09 RX ORDER — SODIUM CHLORIDE 9 MG/ML
INJECTION, SOLUTION INTRAVENOUS CONTINUOUS
Status: CANCELLED | OUTPATIENT
Start: 2022-06-24

## 2022-06-09 RX ORDER — SODIUM CHLORIDE 0.9 % (FLUSH) 0.9 %
10 SYRINGE (ML) INJECTION
Status: CANCELLED | OUTPATIENT
Start: 2022-06-10

## 2022-06-09 NOTE — PROGRESS NOTES
Subjective:       Patient ID: Paul Li Jr. is a 46 y.o. male.    Chief Complaint: Malignant neoplasm involving both nipple and areola of righ    HPI   Returns for follow up after scans and for chemotherapy    - 6/6/2022 PET scan:  FINDINGS:  Quality of the study: Adequate.  In the head and neck, there is nonspecific focal uptake in the right infratemporal fossa without CT correlate which may reflect atypically focal muscular uptake.  There are no other hypermetabolic lesions worrisome for malignancy. There are no hypermetabolic mucosal lesions, and there are no pathologically enlarged or hypermetabolic lymph nodes.  In the chest, there are no hypermetabolic lesions worrisome for malignancy.  No pathologically enlarged or hypermetabolic lymph nodes.  Stable subcentimeter right pulmonary nodules too small to characterize on PET (images 84 and 85). Postsurgical changes of right mastectomy and axillary node dissection.  In the abdomen and pelvis, there is physiologic tracer distribution within the abdominal organs and excretion into the genitourinary system.  Diffuse sigmoid mild uptake, likely related to metformin use or physiologic activity.  There is sigmoid diverticulosis without CT evidence of diverticulitis.  In the bones, there are hypermetabolic lesions at the T12 and T7 vertebral bodies.  Uptake at the T12 vertebral body measures 5.8 SUV max corresponding to a region of prior sclerosis and T7 with an SUV of 4.4 within the body away from focal sclerosis on the right.  Other sclerotic lesions demonstrate uptake similar to normal marrow activity.  In the extremities, there are no hypermetabolic lesions worrisome for malignancy.  Additional CT findings: Bilateral inguinal hernias.  Large left hydrocele.  Impression:  Interval increased uptake in the T7 body away from sclerosis and T12 body involving a region of prior sclerosis.  Differential considerations for T7 include focal nodular hyperplasia of marrow  versus early, CT occult metastasis, and differential considerations for T12 include active osteoblastic metastasis versus healing metastasis.  Other sclerotic lesions demonstrate uptake similar to normal marrow suggesting response to therapy.  Nonspecific focal uptake at the right temporal fossa without CT correlate may indicate atypically focal muscular uptake.  Attention on follow-up      Returns for C10D1 (Abraxane and PO Aplelisib)     Overall feels well. No new issues.   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  No changes in pain  Weight better controlled and feels better as a result  Improved BG control  Appetite good and weight stable.      Diagnosis:  Metastatic TNBC progressed (prior Stage IB ([id number]) triple negative invasive ductal carcinoma with lobular features of right breast, retroareolar, Grade 2, Ki67 80%, diagnosed 2019)     Oncology History:  Metastatic  Set up for scans (due to back pain) which are consistent for recurrence.   Imagin/3/2021 MRI T/L spine:  FINDINGS:  Alignment: Within normal limits.  Vertebrae: Low T1 signal intensity in the left T12 vertebral body extending to the left pedicle, S1 and S2 vertebral bodies with abnormal enhancement.  The vertebral heights are well maintained.  No evidence of acute fractures.  Abnormal appearance of the LEFT sacrum and ilium as well.  Discs: Degenerative disease of the level of L4-L5 with posterior annular fissure.  Conus appears within normal limits terminating at the level of the L2. level.  Cauda equina appears unremarkable.  Mild circumferential disc bulging at the level of T10-T11 abutting the ventral thecal sac.  No significant spinal canal stenosis or neural foraminal narrowing throughout the thoracic spine.  No significant spinal canal stenosis or neural foraminal narrowing throughout the lumbar spine.  Paraspinous muscles and soft tissues: Subcentimeter focal enhancement in the posterior column along the supraspinous  ligament at the level of L1-L2 (sagittal series 21, image 10) potentially inflammatory versus neoplastic.  Impression:  Abnormal appearance of the T12, S1, S2 vertebral bodies as well as LEFT sacrum and ilium concerning for metastatic disease in this patient with history of breast cancer.  No evidence for significant central canal narrowing.  Additional findings, as above.  This report was flagged in Epic as abnormal.     6/9/2021 PET scan:  COMPARISON:  MRI thoracic and lumbar spine 06/03/2021  FINDINGS:  Quality of the study: Adequate.  In the head and neck, there are no hypermetabolic lesions worrisome for malignancy. There are no hypermetabolic mucosal lesions, and there are no pathologically enlarged or hypermetabolic lymph nodes.  In the chest, there is postoperative change of right mastectomy/right axillary lymph node dissection.  There is low level hypermetabolic activity with mild soft tissue thickening in the skin/superficial subcutaneous fat of the right chest wall (axial fused image 78) with SUV max 3.6.  There is soft tissue thickening measuring approximately 1.8 x 7.9 cm in the subcutaneous fat of the right chest wall (axial series 3, image 84) with SUV max 3.1.  There are a few bilateral pulmonary nodules measuring up to 0.3 cm in the left lung (axial series 3, image 88) and 0.5 cm in the right lung (axial series 3, image 84).  These nodules are too small to characterize by PET.  There are no pathologically enlarged or hypermetabolic lymph nodes.  In the abdomen and pelvis, there is physiologic tracer distribution within the abdominal organs and excretion into the genitourinary system.  Subtle sen mesentery and multiple prominent mesenteric lymph nodes measuring up to 1.8 cm in long axis with background activity.  In the bones, there are several hypermetabolic lesions worrisome for malignancy.  Sclerotic lesion in the left T12 vertebral body (axial series 3, image 131) with SUV max 12.  Sclerotic  lesions in the sacrum (for example axial series 3, image 188) with SUV max 9.7.  Sclerotic lesions in the left iliac bone (for example axial series 3, image 205) with SUV max 6.  In the extremities, there are no hypermetabolic lesions worrisome for malignancy.  Incidental findings:  Bilateral maxillary antra mucous retention cysts.  Fat containing right inguinal hernia.  Impression:  1. In this patient with right breast carcinoma status post mastectomy/right axillary lymph node dissection, there are multiple sclerotic lesions in the T12 vertebral body, sacrum, and left iliac bone with hypermetabolic activity concerning for active metastatic disease and in keeping with findings on MRI 06/03/2021.  2. Additionally there is low-level hypermetabolic activity with soft tissue thickening in the right chest wall as detailed above.  These findings are nonspecific and may be related to postoperative/post radiation change, with recurrent malignancy not excluded.  3. Nonspecific mesenteric haziness and lymph nodes which may indicate mesenteric adenitis.  Recommend clinical correlation and attention on follow-up.  4. Additional findings as above.  I, Sohan Tillman MD, attest that I reviewed and interpreted the images.     In summary,   Started aplelisib 8/22/2021   Abraxane C1 started 8/13/2021  We had sent Guardant and discussed results with Molecular tumor board  He also had a repeat bx to confirm metastatic triple negative breast cancer  Plan:   Nab-Paclitaxel and Alpelisib  Reference: https://ascopubs.org/doi/abs/10.1200/JCO.[email or web address]_suppl.1018  Also on Zometa-      - C1D1 Abraxane 8/13/2021  - Started aplelisib 8/22/2021               Oncology History   Malignant neoplasm involving both nipple and areola of right breast in male, estrogen receptor negative   5/1/2019 Imaging Significant Findings     Mammogram and US  Impression:  Right  Mass: Right breast 14 mm mass at the retroareolar anterior position. Assessment: 4 -  Suspicious finding. Biopsy is recommended.   Left  There is no mammographic or sonographic evidence of malignancy.  Recommendation:  Biopsy is recommended.      5/2/2019 Biopsy     BREAST, RIGHT, RETROAREOLAR MASS, ULTRASOUND-GUIDED BIOPSY:  Invasive ductal carcinoma with lobular features.  Size of invasive carcinoma: 5 MM in greatest linear dimension within a single      5/2/2019 Breast Tumor Markers     Estrogen: Negative  Progesterone: Negative  HER2: Negative  Ki67: 80      5/17/2019 Cancer Staged     Staging form: Breast, AJCC 8th Edition  - Clinical stage from 5/17/2019: Stage IB (cT1c, cN0, cM0, G2, ER-, ME-, HER2-) - Signed by Richa Bahena MD on 5/17/2019 5/27/2019 - 7/21/2019 Chemotherapy     Treatment Summary   Plan Name: OP BREAST DOSE-DENSE AC - DOXORUBICIN CYCLOPHOSPHAMIDE Q2W  Treatment Goal: Curative  Status: Inactive  Start Date: 5/27/2019  End Date: 7/9/2019  Provider: Richa Bahena MD  Chemotherapy: DOXOrubicin chemo injection 186 mg, 60 mg/m2 = 186 mg, Intravenous, Clinic/HOD 1 time, 4 of 4 cycles  Administration: 186 mg (5/27/2019), 186 mg (6/10/2019), 186 mg (6/24/2019), 186 mg (7/8/2019)  cyclophosphamide (CYTOXAN) 600 mg/m2 = 1,865 mg in sodium chloride 0.9% 250 mL chemo infusion, 600 mg/m2 = 1,865 mg, Intravenous, Clinic/HOD 1 time, 4 of 4 cycles  Administration: 1,865 mg (5/27/2019), 1,865 mg (6/10/2019), 1,865 mg (6/24/2019), 1,865 mg (7/8/2019)      7/22/2019 - 10/15/2019 Chemotherapy     Treatment Summary   Plan Name: OP PACLITAXEL QW  Treatment Goal: Curative  Status: Inactive  Start Date: 7/24/2019  End Date: 10/8/2019  Provider: Richa Bahena MD  Chemotherapy: PACLitaxel (TAXOL) 80 mg/m2 = 246 mg in sodium chloride 0.9% 250 mL chemo infusion, 80 mg/m2 = 246 mg, Intravenous, Clinic/HOD 1 time, 12 of 12 cycles  Dose modification: 60 mg/m2 (original dose 80 mg/m2, Cycle 3), 55 mg/m2 (original dose 80 mg/m2, Cycle 7), 60 mg/m2 (original dose 80 mg/m2, Cycle 7), 50 mg/m2  (original dose 80 mg/m2, Cycle 9), 50 mg/m2 (original dose 80 mg/m2, Cycle 10)  Administration: 246 mg (7/24/2019), 246 mg (7/31/2019), 186 mg (8/7/2019), 186 mg (8/14/2019), 186 mg (8/21/2019), 186 mg (8/28/2019), 186 mg (9/4/2019), 174 mg (9/11/2019), 156 mg (9/18/2019), 156 mg (9/25/2019), 156 mg (10/1/2019), 156 mg (10/8/2019)      11/22/2019 Breast Surgery     On 11/22/19 Dr whyte performed a right breast mastectomy with SLN biopsy with pathology showing grade 2, 6 mm IDC with extensive treatment effect, no LVI with 1 LN positive 4 mm, negative margins. The on 12/17/19, Dr Whyte performed right axillary dissection with pathology still pending.      11/22/2019 Cancer Staged     ypT1b N1      12/17/2019 Breast Surgery     Surgery: Dr Shima Whyte, [phone] performed right ALND with pathology showing 14 negative lymph nodes.             1/14/2020 Tumor Conference      Post mastectomy radiation therapy: Xeloda, then possible Keytruda trial .      2/3/2020 - 3/23/2020 Radiation Therapy     Treating physician: Speedy Nair MD   Total Dose: 50.4 Gy to the chest wall and regional lymphatics  10 Gy boost to the prostate.   Fractions: 28 fractions at 1.8 Gy per fraction  5 fractions at 2 Gy per fraction       2/28/2020 -  Chemotherapy     * 4/15/2020 - 9/28/20 completed 6 cycles adjuvant Xeloda 1000 mg/m2 bid days 1-14 every 21 days  Treatment Summary   Plan Name: OP CAPECITABINE 1250MG/M2 BID Q3W  Treatment Goal: Curative  Status: Active  Start Date: 3/20/2020  End Date: 3/20/2020  Provider: Richa Bahena MD  Chemotherapy: [No matching medication found in this treatment plan]        Review of Systems   Constitutional: Negative for activity change, appetite change, chills, fatigue, fever and unexpected weight change.   HENT: Negative for dental problem, postnasal drip, rhinorrhea, sinus pressure/congestion, sore throat, trouble swallowing and voice change.    Eyes: Negative for visual disturbance.    Respiratory: Negative for cough, shortness of breath and wheezing.    Cardiovascular: Positive for leg swelling (wears compression socks, improving). Negative for chest pain and palpitations.   Gastrointestinal: Negative for abdominal distention, abdominal pain, blood in stool, change in bowel habit, constipation, diarrhea, nausea, vomiting and change in bowel habit.   Endocrine: Negative for cold intolerance and heat intolerance.   Genitourinary: Negative for decreased urine volume, difficulty urinating, dysuria, frequency, hematuria and urgency.   Musculoskeletal: Negative for arthralgias, back pain, gait problem, joint swelling, myalgias, neck pain and neck stiffness.   Integumentary:  Negative for color change, pallor, rash and wound.   Neurological: Positive for numbness (improved neuropathy). Negative for dizziness, weakness, light-headedness and headaches.   Hematological: Negative for adenopathy. Does not bruise/bleed easily.   Psychiatric/Behavioral: Negative for dysphoric mood and sleep disturbance. The patient is not nervous/anxious.          Objective:      Physical Exam  Vitals and nursing note reviewed.   Constitutional:       General: He is not in acute distress.     Appearance: Normal appearance. He is well-developed. He is obese. He is not diaphoretic.      Comments: Presents alone  Very pleasant  ECOG= 0   HENT:      Head: Normocephalic and atraumatic.   Eyes:      General: No scleral icterus.     Extraocular Movements: Extraocular movements intact.      Conjunctiva/sclera: Conjunctivae normal.      Pupils: Pupils are equal, round, and reactive to light.   Neck:      Thyroid: No thyromegaly.      Trachea: No tracheal deviation.   Cardiovascular:      Rate and Rhythm: Normal rate and regular rhythm.      Heart sounds: Normal heart sounds. No murmur heard.    No friction rub. No gallop.   Pulmonary:      Effort: Pulmonary effort is normal. No respiratory distress.      Breath sounds: Normal breath  sounds. No wheezing, rhonchi or rales.   Chest:      Chest wall: No tenderness.   Abdominal:      General: Bowel sounds are normal. There is no distension.      Palpations: Abdomen is soft. There is no mass.      Tenderness: There is no abdominal tenderness. There is no guarding or rebound.      Comments: No organomegaly   Musculoskeletal:         General: Swelling (chronic RUE lymphedema) present. No tenderness or deformity. Normal range of motion.      Cervical back: Normal range of motion and neck supple. No rigidity or tenderness.      Right lower leg: Edema (trace, chronic) present.      Left lower leg: Edema (trace, chronic) present.      Comments: Left > right edema LE   Lymphadenopathy:      Cervical: No cervical adenopathy.   Skin:     General: Skin is warm and dry.      Coloration: Skin is not jaundiced or pale.      Findings: No erythema or rash.   Neurological:      General: No focal deficit present.      Mental Status: He is alert and oriented to person, place, and time. Mental status is at baseline.      Cranial Nerves: No cranial nerve deficit.      Sensory: No sensory deficit.      Motor: No weakness or abnormal muscle tone.      Coordination: Coordination normal.      Gait: Gait normal.      Deep Tendon Reflexes: Reflexes are normal and symmetric. Reflexes normal.   Psychiatric:         Mood and Affect: Mood normal.         Behavior: Behavior normal.         Thought Content: Thought content normal.         Judgment: Judgment normal.         Assessment:       Problem List Items Addressed This Visit     Type 2 diabetes mellitus without complication    Malignant neoplasm involving both nipple and areola of right breast in male, estrogen receptor negative - Primary    Essential hypertension    Bone metastases    Anemia associated with chemotherapy          Plan:     HTN, DM- better control!  Metastatic breast cancer- we reviewed no evidence of progression and clinically doing great!  Continue current  regimen  RTC 1 week with labs and chemotherapy    Knows to call for any issues    Route Chart for Scheduling    Med Onc Chart Routing  Urgent    Follow up with physician    Follow up with JAC . Needs a 6/23 appt with PJ day prior to 6/24 chemo and needs labs done 6/23- cbc, cmp- instead of 6/24   Labs    Imaging    Pharmacy appointment    Other referrals          Treatment Plan Information   OP BREAST NAB-PACLITAXEL D1, D8, D15 + ALPELISIB    Rosa Linn MD   Upcoming Treatment Dates - OP BREAST NAB-PACLITAXEL D1, D8, D15 + ALPELISIB     6/10/2022       Chemotherapy       PACLitaxeL protein-bound (ABRAXANE) 80 mg/m2 = 240 mg in 48 mL infusion  6/17/2022       Chemotherapy       PACLitaxeL protein-bound (ABRAXANE) 80 mg/m2 = 240 mg in 48 mL infusion  6/24/2022       Chemotherapy       PACLitaxeL protein-bound (ABRAXANE) 80 mg/m2 = 240 mg in 48 mL infusion    Supportive Plan Information  OP FILGRASTIM 480 MCG   Michelle Grayson, NP   Upcoming Treatment Dates - OP FILGRASTIM 480 MCG    No upcoming days in selected categories.    Therapy Plan Information  PORT FLUSH  Flushes  heparin, porcine (PF) 100 unit/mL injection flush 500 Units  500 Units, Intravenous, Every visit  sodium chloride 0.9% flush 10 mL  10 mL, Intravenous, Every visit    ZOLEDRONIC ACID (ZOMETA) IV  Medications  zoledronic 4 mg/100 mL infusion 4 mg  4 mg, Intravenous, Every 4 weeks  Flushes  sodium chloride 0.9% 250 mL flush bag  Intravenous, Every 4 weeks  sodium chloride 0.9% flush 10 mL  10 mL, Intravenous, Every 4 weeks  heparin, porcine (PF) 100 unit/mL injection flush 500 Units  500 Units, Intravenous, Every 4 weeks  alteplase injection 2 mg  2 mg, Intra-Catheter, Every 4 weeks

## 2022-06-13 ENCOUNTER — SPECIALTY PHARMACY (OUTPATIENT)
Dept: PHARMACY | Facility: CLINIC | Age: 47
End: 2022-06-13
Payer: MEDICARE

## 2022-06-13 ENCOUNTER — PATIENT MESSAGE (OUTPATIENT)
Dept: PHARMACY | Facility: CLINIC | Age: 47
End: 2022-06-13
Payer: MEDICARE

## 2022-06-13 NOTE — TELEPHONE ENCOUNTER
Specialty Pharmacy - Refill Coordination    Specialty Medication Orders Linked to Encounter    Flowsheet Row Most Recent Value   Medication #1 alpelisib 300 mg/day (150 mg x 2) Tab (Order#[id number], Rx#[id number])          Refill Questions - Documented Responses    Flowsheet Row Most Recent Value   Patient Availability and HIPAA Verification    Does patient want to proceed with activity? Yes   HIPAA/medical authority confirmed? Yes   Relationship to patient of person spoken to? Self   Refill Screening Questions    Changes to allergies? No   Changes to medications? No   New conditions since last clinic visit? No   Unplanned office visit, urgent care, ED, or hospital admission in the last 4 weeks? No   How does patient/caregiver feel medication is working? Good   Financial problems or insurance changes? No   How many doses of your specialty medications were missed in the last 4 weeks? 0   Would patient like to speak to a pharmacist? No   When does the patient need to receive the medication? 06/19/22   Refill Delivery Questions    How will the patient receive the medication? Delivery [place]   When does the patient need to receive the medication? 06/19/22   Shipping Address Home   Address in [facility] confirmed and updated if neccessary? Yes   Expected Copay ($) 0   Is the patient able to afford the medication copay? Yes   Payment Method zero copay   Days supply of Refill 28   Supplies needed? No supplies needed   Refill activity completed? Yes   Refill activity plan Refill scheduled   Shipment/Pickup Date: 06/16/22          Current Outpatient Medications   Medication Sig    alpelisib 300 mg/day (150 mg x 2) Tab Take 2 tablets (300 mg) by mouth once daily.    ALPRAZolam (XANAX) 0.5 MG tablet Take 1 tablet (0.5 mg total) by mouth 3 (three) times daily as needed for Insomnia or Anxiety.    amLODIPine (NORVASC) 10 MG tablet TAKE 1 TABLET (10 MG) BY MOUTH EVERY DAY    calcium-vitamin D3 (OYSTER SHELL CALCIUM-VIT  "D3) 500 mg-5 mcg (200 unit) per tablet Take 1 tablet by mouth 2 (two) times daily.    diphenoxylate-atropine 2.5-0.025 mg (LOMOTIL) 2.5-0.025 mg per tablet Take 1 tablet by mouth 4 (four) times daily as needed for Diarrhea.    dulaglutide (TRULICITY) 1.5 mg/0.5 mL PnIj Inject 1.5 mg into the skin once a week. Trulicity 0.75mg weekly for 4 weeks & then increase to 1.5 mg weekly indefinitely. (Patient taking differently: Inject 1.5 mg into the skin once a week. Trulicity 0.75mg weekly for 4 weeks & then increase to 1.5 mg weekly indefinitely.(PATIENT TAKES ON SUNDAYS))    FLUoxetine 20 MG capsule Take 2 capsules (40 mg total) by mouth once daily.    gabapentin (NEURONTIN) 300 MG capsule Take 1 capsule (300 mg total) by mouth 3 (three) times daily.    hydroCHLOROthiazide (HYDRODIURIL) 25 MG tablet TAKE 1 TABLET BY MOUTH ONCE DAILY    HYDROcodone-acetaminophen (NORCO)  mg per tablet Take 1 tablet by mouth every 6 (six) hours as needed for Pain.    insulin detemir U-100 (LEVEMIR FLEXTOUCH U-100 INSULN) 100 unit/mL (3 mL) InPn pen Inject 21 Units into the skin every evening.    lancets 33 gauge Misc 1 lancet by Misc.(Non-Drug; Combo Route) route once daily.    lancing device Misc 1 Device by Misc.(Non-Drug; Combo Route) route 2 (two) times daily with meals.    LIDOcaine-prilocaine (EMLA) cream Apply topically as needed.    losartan (COZAAR) 50 MG tablet Take 2 tablets by mouth once daily    metFORMIN (GLUCOPHAGE) 500 MG tablet Take 2 tablets (1,000 mg total) by mouth 2 (two) times daily with meals. Needs appointment for future refills    ondansetron (ZOFRAN-ODT) 8 MG TbDL Take 1 tablet (8 mg total) by mouth every 8 (eight) hours as needed (nausea).    pen needle, diabetic (BD ULTRA-FINE TANESHA PEN NEEDLE) 32 gauge x 5/32" Ndle Use as directed with novolog and levemir    tadalafiL (CIALIS) 5 MG tablet Take 2 tablets (10 mg total) by mouth daily as needed for Erectile Dysfunction.   Last reviewed on " 6/9/2022  8:06 AM by Rosa Linn MD    Review of patient's allergies indicates:  No Known Allergies Last reviewed on  6/9/2022 8:06 AM by Rosa Linn      Tasks added this encounter   7/10/2022 - Refill Call (Auto Added)   Tasks due within next 3 months   No tasks due.     Corina Montero, Patient Care Assistant  [facility]  [address]  [facility] [address]  Phone: [phone]  Fax: [phone]

## 2022-06-17 ENCOUNTER — INFUSION (OUTPATIENT)
Dept: INFUSION THERAPY | Facility: HOSPITAL | Age: 47
End: 2022-06-17
Attending: INTERNAL MEDICINE
Payer: MEDICARE

## 2022-06-17 VITALS
BODY MASS INDEX: 43.02 KG/M2 | WEIGHT: 315 LBS | SYSTOLIC BLOOD PRESSURE: 132 MMHG | HEART RATE: 74 BPM | RESPIRATION RATE: 18 BRPM | TEMPERATURE: 98 F | DIASTOLIC BLOOD PRESSURE: 74 MMHG

## 2022-06-17 DIAGNOSIS — C50.021 MALIGNANT NEOPLASM INVOLVING BOTH NIPPLE AND AREOLA OF RIGHT BREAST IN MALE, ESTROGEN RECEPTOR NEGATIVE: Primary | ICD-10-CM

## 2022-06-17 DIAGNOSIS — Z17.1 MALIGNANT NEOPLASM INVOLVING BOTH NIPPLE AND AREOLA OF RIGHT BREAST IN MALE, ESTROGEN RECEPTOR NEGATIVE: Primary | ICD-10-CM

## 2022-06-17 PROCEDURE — 25000003 PHARM REV CODE 250: Performed by: INTERNAL MEDICINE

## 2022-06-17 PROCEDURE — 96361 HYDRATE IV INFUSION ADD-ON: CPT

## 2022-06-17 PROCEDURE — 96413 CHEMO IV INFUSION 1 HR: CPT

## 2022-06-17 PROCEDURE — A4216 STERILE WATER/SALINE, 10 ML: HCPCS | Performed by: INTERNAL MEDICINE

## 2022-06-17 PROCEDURE — 63600175 PHARM REV CODE 636 W HCPCS: Mod: JG | Performed by: INTERNAL MEDICINE

## 2022-06-17 RX ORDER — HEPARIN 100 UNIT/ML
500 SYRINGE INTRAVENOUS
Status: DISCONTINUED | OUTPATIENT
Start: 2022-06-17 | End: 2022-06-17 | Stop reason: HOSPADM

## 2022-06-17 RX ORDER — SODIUM CHLORIDE 0.9 % (FLUSH) 0.9 %
10 SYRINGE (ML) INJECTION
Status: DISCONTINUED | OUTPATIENT
Start: 2022-06-17 | End: 2022-06-17 | Stop reason: HOSPADM

## 2022-06-17 RX ORDER — SODIUM CHLORIDE 9 MG/ML
INJECTION, SOLUTION INTRAVENOUS CONTINUOUS
Status: DISCONTINUED | OUTPATIENT
Start: 2022-06-17 | End: 2022-06-17 | Stop reason: HOSPADM

## 2022-06-17 RX ADMIN — HEPARIN 500 UNITS: 100 SYRINGE at 04:06

## 2022-06-17 RX ADMIN — PACLITAXEL 240 MG: 100 INJECTION, POWDER, LYOPHILIZED, FOR SUSPENSION INTRAVENOUS at 03:06

## 2022-06-17 RX ADMIN — Medication 10 ML: at 04:06

## 2022-06-17 RX ADMIN — SODIUM CHLORIDE: 0.9 INJECTION, SOLUTION INTRAVENOUS at 03:06

## 2022-06-17 NOTE — PLAN OF CARE
1622 pt tolerated IVF and abraxane infusion without issue, pt to rtc 6/24/22, no distress noted upon d/c to home

## 2022-06-17 NOTE — PLAN OF CARE
1445 pt here for IVF and Abraxane infusion D8C11, labs, hx, meds, allergies reviewed, pt with no new complaints at this time, reclined in chair, continue to monitor

## 2022-06-23 ENCOUNTER — PATIENT MESSAGE (OUTPATIENT)
Dept: PSYCHIATRY | Facility: CLINIC | Age: 47
End: 2022-06-23
Payer: MEDICARE

## 2022-06-23 ENCOUNTER — OFFICE VISIT (OUTPATIENT)
Dept: HEMATOLOGY/ONCOLOGY | Facility: CLINIC | Age: 47
End: 2022-06-23
Payer: MEDICARE

## 2022-06-23 ENCOUNTER — OFFICE VISIT (OUTPATIENT)
Dept: PSYCHIATRY | Facility: CLINIC | Age: 47
End: 2022-06-23
Payer: COMMERCIAL

## 2022-06-23 VITALS
RESPIRATION RATE: 18 BRPM | SYSTOLIC BLOOD PRESSURE: 116 MMHG | TEMPERATURE: 98 F | WEIGHT: 315 LBS | BODY MASS INDEX: 40.43 KG/M2 | DIASTOLIC BLOOD PRESSURE: 62 MMHG | OXYGEN SATURATION: 96 % | HEIGHT: 74 IN | HEART RATE: 58 BPM

## 2022-06-23 DIAGNOSIS — D64.81 ANEMIA ASSOCIATED WITH CHEMOTHERAPY: ICD-10-CM

## 2022-06-23 DIAGNOSIS — D64.81 ANEMIA ASSOCIATED WITH CHEMOTHERAPY: Primary | ICD-10-CM

## 2022-06-23 DIAGNOSIS — C50.021 MALIGNANT NEOPLASM INVOLVING BOTH NIPPLE AND AREOLA OF RIGHT BREAST IN MALE, ESTROGEN RECEPTOR NEGATIVE: Primary | ICD-10-CM

## 2022-06-23 DIAGNOSIS — E11.9 TYPE 2 DIABETES MELLITUS WITHOUT COMPLICATION, UNSPECIFIED WHETHER LONG TERM INSULIN USE: ICD-10-CM

## 2022-06-23 DIAGNOSIS — D70.1 CHEMOTHERAPY-INDUCED NEUTROPENIA: ICD-10-CM

## 2022-06-23 DIAGNOSIS — T45.1X5A ANEMIA ASSOCIATED WITH CHEMOTHERAPY: ICD-10-CM

## 2022-06-23 DIAGNOSIS — F43.23 ADJUSTMENT DISORDER WITH MIXED ANXIETY AND DEPRESSED MOOD: Primary | ICD-10-CM

## 2022-06-23 DIAGNOSIS — C79.51 BONE METASTASES: ICD-10-CM

## 2022-06-23 DIAGNOSIS — Z17.1 MALIGNANT NEOPLASM INVOLVING BOTH NIPPLE AND AREOLA OF RIGHT BREAST IN MALE, ESTROGEN RECEPTOR NEGATIVE: Primary | ICD-10-CM

## 2022-06-23 DIAGNOSIS — T45.1X5A ANEMIA ASSOCIATED WITH CHEMOTHERAPY: Primary | ICD-10-CM

## 2022-06-23 DIAGNOSIS — T45.1X5A CHEMOTHERAPY-INDUCED NEUTROPENIA: ICD-10-CM

## 2022-06-23 PROCEDURE — 3044F HG A1C LEVEL LT 7.0%: CPT | Mod: CPTII,S$GLB,, | Performed by: NURSE PRACTITIONER

## 2022-06-23 PROCEDURE — 99215 OFFICE O/P EST HI 40 MIN: CPT | Mod: S$GLB,,, | Performed by: NURSE PRACTITIONER

## 2022-06-23 PROCEDURE — 99999 PR PBB SHADOW E&M-EST. PATIENT-LVL IV: ICD-10-PCS | Mod: PBBFAC,,, | Performed by: NURSE PRACTITIONER

## 2022-06-23 PROCEDURE — 4010F PR ACE/ARB THEARPY RXD/TAKEN: ICD-10-PCS | Mod: CPTII,S$GLB,, | Performed by: NURSE PRACTITIONER

## 2022-06-23 PROCEDURE — 4010F ACE/ARB THERAPY RXD/TAKEN: CPT | Mod: CPTII,S$GLB,, | Performed by: PSYCHOLOGIST

## 2022-06-23 PROCEDURE — 1160F PR REVIEW ALL MEDS BY PRESCRIBER/CLIN PHARMACIST DOCUMENTED: ICD-10-PCS | Mod: CPTII,S$GLB,, | Performed by: PSYCHOLOGIST

## 2022-06-23 PROCEDURE — 3074F PR MOST RECENT SYSTOLIC BLOOD PRESSURE < 130 MM HG: ICD-10-PCS | Mod: CPTII,S$GLB,, | Performed by: NURSE PRACTITIONER

## 2022-06-23 PROCEDURE — 4010F PR ACE/ARB THEARPY RXD/TAKEN: ICD-10-PCS | Mod: CPTII,S$GLB,, | Performed by: PSYCHOLOGIST

## 2022-06-23 PROCEDURE — 3044F PR MOST RECENT HEMOGLOBIN A1C LEVEL <7.0%: ICD-10-PCS | Mod: CPTII,S$GLB,, | Performed by: NURSE PRACTITIONER

## 2022-06-23 PROCEDURE — 99999 PR PBB SHADOW E&M-EST. PATIENT-LVL I: CPT | Mod: PBBFAC,,, | Performed by: PSYCHOLOGIST

## 2022-06-23 PROCEDURE — 99215 PR OFFICE/OUTPT VISIT, EST, LEVL V, 40-54 MIN: ICD-10-PCS | Mod: S$GLB,,, | Performed by: NURSE PRACTITIONER

## 2022-06-23 PROCEDURE — 3044F HG A1C LEVEL LT 7.0%: CPT | Mod: CPTII,S$GLB,, | Performed by: PSYCHOLOGIST

## 2022-06-23 PROCEDURE — 1159F PR MEDICATION LIST DOCUMENTED IN MEDICAL RECORD: ICD-10-PCS | Mod: CPTII,S$GLB,, | Performed by: PSYCHOLOGIST

## 2022-06-23 PROCEDURE — 3044F PR MOST RECENT HEMOGLOBIN A1C LEVEL <7.0%: ICD-10-PCS | Mod: CPTII,S$GLB,, | Performed by: PSYCHOLOGIST

## 2022-06-23 PROCEDURE — 3078F DIAST BP <80 MM HG: CPT | Mod: CPTII,S$GLB,, | Performed by: NURSE PRACTITIONER

## 2022-06-23 PROCEDURE — 4010F ACE/ARB THERAPY RXD/TAKEN: CPT | Mod: CPTII,S$GLB,, | Performed by: NURSE PRACTITIONER

## 2022-06-23 PROCEDURE — 3008F PR BODY MASS INDEX (BMI) DOCUMENTED: ICD-10-PCS | Mod: CPTII,S$GLB,, | Performed by: NURSE PRACTITIONER

## 2022-06-23 PROCEDURE — 3074F SYST BP LT 130 MM HG: CPT | Mod: CPTII,S$GLB,, | Performed by: NURSE PRACTITIONER

## 2022-06-23 PROCEDURE — 1159F MED LIST DOCD IN RCRD: CPT | Mod: CPTII,S$GLB,, | Performed by: PSYCHOLOGIST

## 2022-06-23 PROCEDURE — 99999 PR PBB SHADOW E&M-EST. PATIENT-LVL I: ICD-10-PCS | Mod: PBBFAC,,, | Performed by: PSYCHOLOGIST

## 2022-06-23 PROCEDURE — 99999 PR PBB SHADOW E&M-EST. PATIENT-LVL IV: CPT | Mod: PBBFAC,,, | Performed by: NURSE PRACTITIONER

## 2022-06-23 PROCEDURE — 3008F BODY MASS INDEX DOCD: CPT | Mod: CPTII,S$GLB,, | Performed by: NURSE PRACTITIONER

## 2022-06-23 PROCEDURE — 1160F RVW MEDS BY RX/DR IN RCRD: CPT | Mod: CPTII,S$GLB,, | Performed by: PSYCHOLOGIST

## 2022-06-23 PROCEDURE — 99211 OFF/OP EST MAY X REQ PHY/QHP: CPT | Mod: PBBFAC | Performed by: PSYCHOLOGIST

## 2022-06-23 PROCEDURE — 90834 PSYTX W PT 45 MINUTES: CPT | Mod: S$GLB,,, | Performed by: PSYCHOLOGIST

## 2022-06-23 PROCEDURE — 90834 PR PSYCHOTHERAPY W/PATIENT, 45 MIN: ICD-10-PCS | Mod: S$GLB,,, | Performed by: PSYCHOLOGIST

## 2022-06-23 PROCEDURE — 3078F PR MOST RECENT DIASTOLIC BLOOD PRESSURE < 80 MM HG: ICD-10-PCS | Mod: CPTII,S$GLB,, | Performed by: NURSE PRACTITIONER

## 2022-06-23 NOTE — PROGRESS NOTES
Subjective:       Patient ID: Paul Li Jr. is a 46 y.o. male.    Chief Complaint: Malignant neoplasm involving both nipple and areola of righ    HPI       Returns for follow up for chemotherapy C11D8 (Abraxane and PO Aplelisib)  He missed last week due to bad weather.   Feels good today.  No new issues or complaints.   No pain issues.   No f/c  Appetite good   Lymphedema stable.   Less focus and asking about adderall as would like to try.  Also needs a new PCP and to see urology to discuss hydrocele.     Graduating soon      Most recent imaging-   - 6/6/2022 PET scan:  FINDINGS:  Quality of the study: Adequate.  In the head and neck, there is nonspecific focal uptake in the right infratemporal fossa without CT correlate which may reflect atypically focal muscular uptake.  There are no other hypermetabolic lesions worrisome for malignancy. There are no hypermetabolic mucosal lesions, and there are no pathologically enlarged or hypermetabolic lymph nodes.  In the chest, there are no hypermetabolic lesions worrisome for malignancy.  No pathologically enlarged or hypermetabolic lymph nodes.  Stable subcentimeter right pulmonary nodules too small to characterize on PET (images 84 and 85). Postsurgical changes of right mastectomy and axillary node dissection.  In the abdomen and pelvis, there is physiologic tracer distribution within the abdominal organs and excretion into the genitourinary system.  Diffuse sigmoid mild uptake, likely related to metformin use or physiologic activity.  There is sigmoid diverticulosis without CT evidence of diverticulitis.  In the bones, there are hypermetabolic lesions at the T12 and T7 vertebral bodies.  Uptake at the T12 vertebral body measures 5.8 SUV max corresponding to a region of prior sclerosis and T7 with an SUV of 4.4 within the body away from focal sclerosis on the right.  Other sclerotic lesions demonstrate uptake similar to normal marrow activity.  In the extremities,  there are no hypermetabolic lesions worrisome for malignancy.  Additional CT findings: Bilateral inguinal hernias.  Large left hydrocele.  Impression:  Interval increased uptake in the T7 body away from sclerosis and T12 body involving a region of prior sclerosis.  Differential considerations for T7 include focal nodular hyperplasia of marrow versus early, CT occult metastasis, and differential considerations for T12 include active osteoblastic metastasis versus healing metastasis.  Other sclerotic lesions demonstrate uptake similar to normal marrow suggesting response to therapy.  Nonspecific focal uptake at the right temporal fossa without CT correlate may indicate atypically focal muscular uptake.  Attention on follow-up         Diagnosis:  Metastatic TNBC progressed (prior Stage IB (S7hV9W4) triple negative invasive ductal carcinoma with lobular features of right breast, retroareolar, Grade 2, Ki67 80%, diagnosed 2019)     Oncology History:  Metastatic  Set up for scans (due to back pain) which are consistent for recurrence.   Imagin/3/2021 MRI T/L spine:  FINDINGS:  Alignment: Within normal limits.  Vertebrae: Low T1 signal intensity in the left T12 vertebral body extending to the left pedicle, S1 and S2 vertebral bodies with abnormal enhancement.  The vertebral heights are well maintained.  No evidence of acute fractures.  Abnormal appearance of the LEFT sacrum and ilium as well.  Discs: Degenerative disease of the level of L4-L5 with posterior annular fissure.  Conus appears within normal limits terminating at the level of the L2. level.  Cauda equina appears unremarkable.  Mild circumferential disc bulging at the level of T10-T11 abutting the ventral thecal sac.  No significant spinal canal stenosis or neural foraminal narrowing throughout the thoracic spine.  No significant spinal canal stenosis or neural foraminal narrowing throughout the lumbar spine.  Paraspinous muscles and soft tissues:  Subcentimeter focal enhancement in the posterior column along the supraspinous ligament at the level of L1-L2 (sagittal series 21, image 10) potentially inflammatory versus neoplastic.  Impression:  Abnormal appearance of the T12, S1, S2 vertebral bodies as well as LEFT sacrum and ilium concerning for metastatic disease in this patient with history of breast cancer.  No evidence for significant central canal narrowing.  Additional findings, as above.  This report was flagged in Epic as abnormal.     6/9/2021 PET scan:  COMPARISON:  MRI thoracic and lumbar spine 06/03/2021  FINDINGS:  Quality of the study: Adequate.  In the head and neck, there are no hypermetabolic lesions worrisome for malignancy. There are no hypermetabolic mucosal lesions, and there are no pathologically enlarged or hypermetabolic lymph nodes.  In the chest, there is postoperative change of right mastectomy/right axillary lymph node dissection.  There is low level hypermetabolic activity with mild soft tissue thickening in the skin/superficial subcutaneous fat of the right chest wall (axial fused image 78) with SUV max 3.6.  There is soft tissue thickening measuring approximately 1.8 x 7.9 cm in the subcutaneous fat of the right chest wall (axial series 3, image 84) with SUV max 3.1.  There are a few bilateral pulmonary nodules measuring up to 0.3 cm in the left lung (axial series 3, image 88) and 0.5 cm in the right lung (axial series 3, image 84).  These nodules are too small to characterize by PET.  There are no pathologically enlarged or hypermetabolic lymph nodes.  In the abdomen and pelvis, there is physiologic tracer distribution within the abdominal organs and excretion into the genitourinary system.  Subtle sen mesentery and multiple prominent mesenteric lymph nodes measuring up to 1.8 cm in long axis with background activity.  In the bones, there are several hypermetabolic lesions worrisome for malignancy.  Sclerotic lesion in the  left T12 vertebral body (axial series 3, image 131) with SUV max 12.  Sclerotic lesions in the sacrum (for example axial series 3, image 188) with SUV max 9.7.  Sclerotic lesions in the left iliac bone (for example axial series 3, image 205) with SUV max 6.  In the extremities, there are no hypermetabolic lesions worrisome for malignancy.  Incidental findings:  Bilateral maxillary antra mucous retention cysts.  Fat containing right inguinal hernia.  Impression:  1. In this patient with right breast carcinoma status post mastectomy/right axillary lymph node dissection, there are multiple sclerotic lesions in the T12 vertebral body, sacrum, and left iliac bone with hypermetabolic activity concerning for active metastatic disease and in keeping with findings on MRI 06/03/2021.  2. Additionally there is low-level hypermetabolic activity with soft tissue thickening in the right chest wall as detailed above.  These findings are nonspecific and may be related to postoperative/post radiation change, with recurrent malignancy not excluded.  3. Nonspecific mesenteric haziness and lymph nodes which may indicate mesenteric adenitis.  Recommend clinical correlation and attention on follow-up.  4. Additional findings as above.  I, Sohan Tillman MD, attest that I reviewed and interpreted the images.     In summary,   Started aplelisib 8/22/2021   Abraxane C1 started 8/13/2021  We had sent Guardant and discussed results with Molecular tumor board  He also had a repeat bx to confirm metastatic triple negative breast cancer  Plan:   Nab-Paclitaxel and Alpelisib  Reference: https://ascopubs.org/doi/abs/10.1200/JCO.[email or web address]_suppl.1018  Also on Zometa-      - C1D1 Abraxane 8/13/2021  - Started aplelisib 8/22/2021               Oncology History   Malignant neoplasm involving both nipple and areola of right breast in male, estrogen receptor negative   5/1/2019 Imaging Significant Findings     Mammogram and US  Impression:  Right  Mass:  Right breast 14 mm mass at the retroareolar anterior position. Assessment: 4 - Suspicious finding. Biopsy is recommended.   Left  There is no mammographic or sonographic evidence of malignancy.  Recommendation:  Biopsy is recommended.      5/2/2019 Biopsy     BREAST, RIGHT, RETROAREOLAR MASS, ULTRASOUND-GUIDED BIOPSY:  Invasive ductal carcinoma with lobular features.  Size of invasive carcinoma: 5 MM in greatest linear dimension within a single      5/2/2019 Breast Tumor Markers     Estrogen: Negative  Progesterone: Negative  HER2: Negative  Ki67: 80      5/17/2019 Cancer Staged     Staging form: Breast, AJCC 8th Edition  - Clinical stage from 5/17/2019: Stage IB (cT1c, cN0, cM0, G2, ER-, RI-, HER2-) - Signed by Richa Bahena MD on 5/17/2019 5/27/2019 - 7/21/2019 Chemotherapy     Treatment Summary   Plan Name: OP BREAST DOSE-DENSE AC - DOXORUBICIN CYCLOPHOSPHAMIDE Q2W  Treatment Goal: Curative  Status: Inactive  Start Date: 5/27/2019  End Date: 7/9/2019  Provider: Richa Bahena MD  Chemotherapy: DOXOrubicin chemo injection 186 mg, 60 mg/m2 = 186 mg, Intravenous, Clinic/HOD 1 time, 4 of 4 cycles  Administration: 186 mg (5/27/2019), 186 mg (6/10/2019), 186 mg (6/24/2019), 186 mg (7/8/2019)  cyclophosphamide (CYTOXAN) 600 mg/m2 = 1,865 mg in sodium chloride 0.9% 250 mL chemo infusion, 600 mg/m2 = 1,865 mg, Intravenous, Clinic/HOD 1 time, 4 of 4 cycles  Administration: 1,865 mg (5/27/2019), 1,865 mg (6/10/2019), 1,865 mg (6/24/2019), 1,865 mg (7/8/2019)      7/22/2019 - 10/15/2019 Chemotherapy     Treatment Summary   Plan Name: OP PACLITAXEL QW  Treatment Goal: Curative  Status: Inactive  Start Date: 7/24/2019  End Date: 10/8/2019  Provider: Richa Bahena MD  Chemotherapy: PACLitaxel (TAXOL) 80 mg/m2 = 246 mg in sodium chloride 0.9% 250 mL chemo infusion, 80 mg/m2 = 246 mg, Intravenous, Clinic/HOD 1 time, 12 of 12 cycles  Dose modification: 60 mg/m2 (original dose 80 mg/m2, Cycle 3), 55 mg/m2 (original  dose 80 mg/m2, Cycle 7), 60 mg/m2 (original dose 80 mg/m2, Cycle 7), 50 mg/m2 (original dose 80 mg/m2, Cycle 9), 50 mg/m2 (original dose 80 mg/m2, Cycle 10)  Administration: 246 mg (7/24/2019), 246 mg (7/31/2019), 186 mg (8/7/2019), 186 mg (8/14/2019), 186 mg (8/21/2019), 186 mg (8/28/2019), 186 mg (9/4/2019), 174 mg (9/11/2019), 156 mg (9/18/2019), 156 mg (9/25/2019), 156 mg (10/1/2019), 156 mg (10/8/2019)      11/22/2019 Breast Surgery     On 11/22/19 Dr whyte performed a right breast mastectomy with SLN biopsy with pathology showing grade 2, 6 mm IDC with extensive treatment effect, no LVI with 1 LN positive 4 mm, negative margins. The on 12/17/19, Dr Whyte performed right axillary dissection with pathology still pending.      11/22/2019 Cancer Staged     ypT1b N1      12/17/2019 Breast Surgery     Surgery: Dr Shima Whyte, [phone] performed right ALND with pathology showing 14 negative lymph nodes.             1/14/2020 Tumor Conference      Post mastectomy radiation therapy: Xeloda, then possible Keytruda trial .      2/3/2020 - 3/23/2020 Radiation Therapy     Treating physician: Speedy Nair MD   Total Dose: 50.4 Gy to the chest wall and regional lymphatics  10 Gy boost to the prostate.   Fractions: 28 fractions at 1.8 Gy per fraction  5 fractions at 2 Gy per fraction       2/28/2020 -  Chemotherapy     * 4/15/2020 - 9/28/20 completed 6 cycles adjuvant Xeloda 1000 mg/m2 bid days 1-14 every 21 days  Treatment Summary   Plan Name: OP CAPECITABINE 1250MG/M2 BID Q3W  Treatment Goal: Curative  Status: Active  Start Date: 3/20/2020  End Date: 3/20/2020  Provider: Richa Bahena MD  Chemotherapy: [No matching medication found in this treatment plan]        Review of Systems   Constitutional:        See above   All other systems reviewed and are negative.        Objective:      Physical Exam  Vitals and nursing note reviewed.   Constitutional:       General: He is not in acute distress.     Appearance:  Normal appearance. He is well-developed. He is obese. He is not diaphoretic.      Comments: Presents alone  Very pleasant  ECOG= 0   HENT:      Head: Normocephalic and atraumatic.   Eyes:      General: No scleral icterus.     Extraocular Movements: Extraocular movements intact.      Conjunctiva/sclera: Conjunctivae normal.      Pupils: Pupils are equal, round, and reactive to light.   Neck:      Thyroid: No thyromegaly.      Trachea: No tracheal deviation.   Cardiovascular:      Rate and Rhythm: Normal rate and regular rhythm.      Heart sounds: Normal heart sounds. No murmur heard.    No friction rub. No gallop.   Pulmonary:      Effort: Pulmonary effort is normal. No respiratory distress.      Breath sounds: Normal breath sounds. No wheezing, rhonchi or rales.   Chest:      Chest wall: No tenderness.   Abdominal:      General: Bowel sounds are normal. There is no distension.      Palpations: Abdomen is soft. There is no mass.      Tenderness: There is no abdominal tenderness. There is no guarding or rebound.      Comments: No organomegaly   Musculoskeletal:         General: Swelling (chronic RUE lymphedema) present. No tenderness or deformity. Normal range of motion.      Cervical back: Normal range of motion and neck supple. No rigidity or tenderness.      Right lower leg: Edema (trace, chronic) present.      Left lower leg: Edema (trace, chronic) present.      Comments: Left > right edema LE   Lymphadenopathy:      Cervical: No cervical adenopathy.   Skin:     General: Skin is warm and dry.      Coloration: Skin is not jaundiced or pale.      Findings: No erythema or rash.   Neurological:      General: No focal deficit present.      Mental Status: He is alert and oriented to person, place, and time. Mental status is at baseline.      Cranial Nerves: No cranial nerve deficit.      Sensory: No sensory deficit.      Motor: No weakness or abnormal muscle tone.      Coordination: Coordination normal.      Gait: Gait  normal.      Deep Tendon Reflexes: Reflexes are normal and symmetric. Reflexes normal.   Psychiatric:         Mood and Affect: Mood normal.         Behavior: Behavior normal.         Thought Content: Thought content normal.         Judgment: Judgment normal.         Assessment:       Problem List Items Addressed This Visit        Oncology    Malignant neoplasm involving both nipple and areola of right breast in male, estrogen receptor negative - Primary    Chemotherapy-induced neutropenia    Bone metastases    Anemia associated with chemotherapy       Endocrine    Type 2 diabetes mellitus without complication          Plan:           Metastatic breast cancer  -most recent PET with no evidence of progression.   -Continues to do well clinically.   -Continue current regimen- C11D8 abraxane tomorrow  -neutropenic precautions.   -appt with palliative to discuss ritalin  -Refer to internal medicine.   -appt with urology for follow up  RTC 1 week with labs and chemotherapy C11D15 abraxane   HTN/DM well controlled.   Knows to call for any issues    Route Chart for Scheduling    Med Onc Chart Routing  Urgent    Follow up with physician 3 weeks. With cbc, cmp on thurday and chemo friday   Follow up with JAC . Just needs labs (cbc,cmp)  next thursday at 730 and chemo next friday. no EP needed;    Infusion scheduling note    Injection scheduling note    Labs    Imaging    Pharmacy appointment    Other referrals          Treatment Plan Information   OP BREAST NAB-PACLITAXEL D1, D8, D15 + ALPELISIB    Rosa Linn MD   Upcoming Treatment Dates - OP BREAST NAB-PACLITAXEL D1, D8, D15 + ALPELISIB     6/24/2022       Chemotherapy       PACLitaxeL protein-bound (ABRAXANE) 80 mg/m2 = 240 mg in 48 mL infusion  7/1/2022       Chemotherapy       PACLitaxeL protein-bound (ABRAXANE) 80 mg/m2 = 240 mg in 48 mL infusion  7/15/2022       Chemotherapy       PACLitaxeL protein-bound (ABRAXANE) 80 mg/m2 = 240 mg in 48 mL infusion  7/29/2022        Chemotherapy       PACLitaxeL protein-bound (ABRAXANE) 80 mg/m2 = 240 mg in 48 mL infusion    Supportive Plan Information  OP FILGRASTIM 480 MCG   Michelle Grayson, LAUREEN   Upcoming Treatment Dates - OP FILGRASTIM 480 MCG    No upcoming days in selected categories.    Therapy Plan Information  PORT FLUSH  Flushes  heparin, porcine (PF) 100 unit/mL injection flush 500 Units  500 Units, Intravenous, Every visit  sodium chloride 0.9% flush 10 mL  10 mL, Intravenous, Every visit    ZOLEDRONIC ACID (ZOMETA) IV  Medications  zoledronic 4 mg/100 mL infusion 4 mg  4 mg, Intravenous, Every 4 weeks  Flushes  sodium chloride 0.9% 250 mL flush bag  Intravenous, Every 4 weeks  sodium chloride 0.9% flush 10 mL  10 mL, Intravenous, Every 4 weeks  heparin, porcine (PF) 100 unit/mL injection flush 500 Units  500 Units, Intravenous, Every 4 weeks  alteplase injection 2 mg  2 mg, Intra-Catheter, Every 4 weeks        Patient is in agreement with the proposed treatment plan. All questions were answered to the patient's satisfaction. Pt knows to call clinic for any new or worsening symptoms and if anything is needed before the next clinic visit.      OZIEL Jefferson-C  Hematology & Oncology  [facility] [place] [address]  ph. [phone]  Fax. [phone]     Face to Face time with patient:30 minutes  40minutes of total time spent on the encounter, which includes face to face time and non-face to face time preparing to see the patient (eg, review of tests), Obtaining and/or reviewing separately obtained history, Documenting clinical information in the electronic or other health record, Independently interpreting results (not separately reported) and communicating results to the patient/family/caregiver, or Care coordination (not separately reported).

## 2022-06-23 NOTE — Clinical Note
Please make an appt with palliative to discuss ritalin -Refer to internal medicine as needs PCP.  -appt with urology for follow up -see cc chart  Dental
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
## 2022-06-24 ENCOUNTER — INFUSION (OUTPATIENT)
Dept: INFUSION THERAPY | Facility: HOSPITAL | Age: 47
End: 2022-06-24
Attending: INTERNAL MEDICINE
Payer: MEDICARE

## 2022-06-24 VITALS
SYSTOLIC BLOOD PRESSURE: 123 MMHG | WEIGHT: 315 LBS | TEMPERATURE: 99 F | BODY MASS INDEX: 40.43 KG/M2 | HEART RATE: 60 BPM | DIASTOLIC BLOOD PRESSURE: 58 MMHG | OXYGEN SATURATION: 99 % | RESPIRATION RATE: 18 BRPM | HEIGHT: 74 IN

## 2022-06-24 DIAGNOSIS — C79.51 BONE METASTASES: ICD-10-CM

## 2022-06-24 DIAGNOSIS — C50.021 MALIGNANT NEOPLASM INVOLVING BOTH NIPPLE AND AREOLA OF RIGHT BREAST IN MALE, ESTROGEN RECEPTOR NEGATIVE: Primary | ICD-10-CM

## 2022-06-24 DIAGNOSIS — Z17.1 MALIGNANT NEOPLASM INVOLVING BOTH NIPPLE AND AREOLA OF RIGHT BREAST IN MALE, ESTROGEN RECEPTOR NEGATIVE: Primary | ICD-10-CM

## 2022-06-24 PROCEDURE — 25000003 PHARM REV CODE 250: Performed by: INTERNAL MEDICINE

## 2022-06-24 PROCEDURE — 96360 HYDRATION IV INFUSION INIT: CPT

## 2022-06-24 PROCEDURE — 96417 CHEMO IV INFUS EACH ADDL SEQ: CPT

## 2022-06-24 PROCEDURE — 63600175 PHARM REV CODE 636 W HCPCS: Performed by: INTERNAL MEDICINE

## 2022-06-24 PROCEDURE — A4216 STERILE WATER/SALINE, 10 ML: HCPCS | Performed by: INTERNAL MEDICINE

## 2022-06-24 PROCEDURE — 96361 HYDRATE IV INFUSION ADD-ON: CPT

## 2022-06-24 PROCEDURE — 96413 CHEMO IV INFUSION 1 HR: CPT

## 2022-06-24 RX ORDER — ZOLEDRONIC ACID 0.04 MG/ML
4 INJECTION, SOLUTION INTRAVENOUS
Status: COMPLETED | OUTPATIENT
Start: 2022-06-24 | End: 2022-06-24

## 2022-06-24 RX ORDER — HEPARIN 100 UNIT/ML
500 SYRINGE INTRAVENOUS
Status: CANCELLED | OUTPATIENT
Start: 2022-07-22

## 2022-06-24 RX ORDER — ZOLEDRONIC ACID 0.04 MG/ML
4 INJECTION, SOLUTION INTRAVENOUS
Status: CANCELLED | OUTPATIENT
Start: 2022-07-22

## 2022-06-24 RX ORDER — SODIUM CHLORIDE 9 MG/ML
INJECTION, SOLUTION INTRAVENOUS CONTINUOUS
Status: DISCONTINUED | OUTPATIENT
Start: 2022-06-24 | End: 2022-06-24 | Stop reason: HOSPADM

## 2022-06-24 RX ORDER — HEPARIN 100 UNIT/ML
500 SYRINGE INTRAVENOUS
Status: DISCONTINUED | OUTPATIENT
Start: 2022-06-24 | End: 2022-06-24 | Stop reason: HOSPADM

## 2022-06-24 RX ORDER — SODIUM CHLORIDE 0.9 % (FLUSH) 0.9 %
10 SYRINGE (ML) INJECTION
Status: DISCONTINUED | OUTPATIENT
Start: 2022-06-24 | End: 2022-06-24 | Stop reason: HOSPADM

## 2022-06-24 RX ORDER — SODIUM CHLORIDE 0.9 % (FLUSH) 0.9 %
10 SYRINGE (ML) INJECTION
Status: CANCELLED | OUTPATIENT
Start: 2022-07-22

## 2022-06-24 RX ADMIN — PACLITAXEL 240 MG: 100 INJECTION, POWDER, LYOPHILIZED, FOR SUSPENSION INTRAVENOUS at 03:06

## 2022-06-24 RX ADMIN — Medication 10 ML: at 05:06

## 2022-06-24 RX ADMIN — SODIUM CHLORIDE: 0.9 INJECTION, SOLUTION INTRAVENOUS at 03:06

## 2022-06-24 RX ADMIN — HEPARIN SODIUM (PORCINE) LOCK FLUSH IV SOLN 100 UNIT/ML 500 UNITS: 100 SOLUTION at 05:06

## 2022-06-24 RX ADMIN — ZOLEDRONIC ACID 4 MG: 0.04 INJECTION, SOLUTION INTRAVENOUS at 03:06

## 2022-06-29 ENCOUNTER — TELEPHONE (OUTPATIENT)
Dept: INFUSION THERAPY | Facility: HOSPITAL | Age: 47
End: 2022-06-29
Payer: MEDICARE

## 2022-06-30 ENCOUNTER — LAB VISIT (OUTPATIENT)
Dept: LAB | Facility: HOSPITAL | Age: 47
End: 2022-06-30
Attending: INTERNAL MEDICINE
Payer: MEDICARE

## 2022-06-30 DIAGNOSIS — Z17.1 MALIGNANT NEOPLASM INVOLVING BOTH NIPPLE AND AREOLA OF RIGHT BREAST IN MALE, ESTROGEN RECEPTOR NEGATIVE: ICD-10-CM

## 2022-06-30 DIAGNOSIS — C50.021 MALIGNANT NEOPLASM INVOLVING BOTH NIPPLE AND AREOLA OF RIGHT BREAST IN MALE, ESTROGEN RECEPTOR NEGATIVE: ICD-10-CM

## 2022-06-30 LAB
ALBUMIN SERPL BCP-MCNC: 3.6 G/DL (ref 3.5–5.2)
ALP SERPL-CCNC: 68 U/L (ref 55–135)
ALT SERPL W/O P-5'-P-CCNC: 21 U/L (ref 10–44)
ANION GAP SERPL CALC-SCNC: 8 MMOL/L (ref 8–16)
AST SERPL-CCNC: 18 U/L (ref 10–40)
BASOPHILS # BLD AUTO: 0.02 K/UL (ref 0–0.2)
BASOPHILS NFR BLD: 0.7 % (ref 0–1.9)
BILIRUB SERPL-MCNC: 0.6 MG/DL (ref 0.1–1)
BUN SERPL-MCNC: 13 MG/DL (ref 6–20)
CALCIUM SERPL-MCNC: 9 MG/DL (ref 8.7–10.5)
CHLORIDE SERPL-SCNC: 103 MMOL/L (ref 95–110)
CO2 SERPL-SCNC: 25 MMOL/L (ref 23–29)
CREAT SERPL-MCNC: 1.1 MG/DL (ref 0.5–1.4)
DIFFERENTIAL METHOD: ABNORMAL
EOSINOPHIL # BLD AUTO: 0 K/UL (ref 0–0.5)
EOSINOPHIL NFR BLD: 1 % (ref 0–8)
ERYTHROCYTE [DISTWIDTH] IN BLOOD BY AUTOMATED COUNT: 16.6 % (ref 11.5–14.5)
EST. GFR  (AFRICAN AMERICAN): >60 ML/MIN/1.73 M^2
EST. GFR  (NON AFRICAN AMERICAN): >60 ML/MIN/1.73 M^2
GLUCOSE SERPL-MCNC: 90 MG/DL (ref 70–110)
HCT VFR BLD AUTO: 33.1 % (ref 40–54)
HGB BLD-MCNC: 10.3 G/DL (ref 14–18)
IMM GRANULOCYTES # BLD AUTO: 0.02 K/UL (ref 0–0.04)
IMM GRANULOCYTES NFR BLD AUTO: 0.7 % (ref 0–0.5)
LYMPHOCYTES # BLD AUTO: 1.1 K/UL (ref 1–4.8)
LYMPHOCYTES NFR BLD: 36.8 % (ref 18–48)
MCH RBC QN AUTO: 25.6 PG (ref 27–31)
MCHC RBC AUTO-ENTMCNC: 31.1 G/DL (ref 32–36)
MCV RBC AUTO: 82 FL (ref 82–98)
MONOCYTES # BLD AUTO: 0.2 K/UL (ref 0.3–1)
MONOCYTES NFR BLD: 7.6 % (ref 4–15)
NEUTROPHILS # BLD AUTO: 1.6 K/UL (ref 1.8–7.7)
NEUTROPHILS NFR BLD: 53.2 % (ref 38–73)
NRBC BLD-RTO: 0 /100 WBC
PLATELET # BLD AUTO: 212 K/UL (ref 150–450)
PMV BLD AUTO: 10.2 FL (ref 9.2–12.9)
POTASSIUM SERPL-SCNC: 4.3 MMOL/L (ref 3.5–5.1)
PROT SERPL-MCNC: 7 G/DL (ref 6–8.4)
RBC # BLD AUTO: 4.02 M/UL (ref 4.6–6.2)
SODIUM SERPL-SCNC: 136 MMOL/L (ref 136–145)
WBC # BLD AUTO: 2.91 K/UL (ref 3.9–12.7)

## 2022-06-30 PROCEDURE — 36415 COLL VENOUS BLD VENIPUNCTURE: CPT | Performed by: INTERNAL MEDICINE

## 2022-06-30 PROCEDURE — 80053 COMPREHEN METABOLIC PANEL: CPT | Performed by: INTERNAL MEDICINE

## 2022-06-30 PROCEDURE — 85025 COMPLETE CBC W/AUTO DIFF WBC: CPT | Performed by: INTERNAL MEDICINE

## 2022-07-01 ENCOUNTER — PES CALL (OUTPATIENT)
Dept: ADMINISTRATIVE | Facility: CLINIC | Age: 47
End: 2022-07-01
Payer: MEDICARE

## 2022-07-01 ENCOUNTER — PATIENT OUTREACH (OUTPATIENT)
Dept: ADMINISTRATIVE | Facility: HOSPITAL | Age: 47
End: 2022-07-01
Payer: MEDICARE

## 2022-07-01 NOTE — PROGRESS NOTES
2022 Care Everywhere updates requested and reviewed.  Immunizations reconciled. Media reports reviewed.  Duplicate HM overrides and  orders removed.  Overdue HM topic chart audit and/or requested.  Overdue lab testing linked to upcoming lab appointments if applies.    Health Maintenance Due   Topic Date Due    Hepatitis C Screening  Never done    Pneumococcal Vaccines (Age 0-64) (1 - PCV) Never done    Low Dose Statin  Never done    Foot Exam  2020    Diabetes Urine Screening  2021    COVID-19 Vaccine (2 - Booster for Karely series) 2021

## 2022-07-02 ENCOUNTER — INFUSION (OUTPATIENT)
Dept: INFUSION THERAPY | Facility: HOSPITAL | Age: 47
End: 2022-07-02
Attending: INTERNAL MEDICINE
Payer: MEDICARE

## 2022-07-02 VITALS
SYSTOLIC BLOOD PRESSURE: 109 MMHG | RESPIRATION RATE: 16 BRPM | TEMPERATURE: 98 F | HEART RATE: 59 BPM | DIASTOLIC BLOOD PRESSURE: 60 MMHG

## 2022-07-02 DIAGNOSIS — C50.021 MALIGNANT NEOPLASM INVOLVING BOTH NIPPLE AND AREOLA OF RIGHT BREAST IN MALE, ESTROGEN RECEPTOR NEGATIVE: Primary | ICD-10-CM

## 2022-07-02 DIAGNOSIS — Z17.1 MALIGNANT NEOPLASM INVOLVING BOTH NIPPLE AND AREOLA OF RIGHT BREAST IN MALE, ESTROGEN RECEPTOR NEGATIVE: Primary | ICD-10-CM

## 2022-07-02 PROCEDURE — 63600175 PHARM REV CODE 636 W HCPCS: Performed by: INTERNAL MEDICINE

## 2022-07-02 PROCEDURE — 96361 HYDRATE IV INFUSION ADD-ON: CPT

## 2022-07-02 PROCEDURE — A4216 STERILE WATER/SALINE, 10 ML: HCPCS | Performed by: INTERNAL MEDICINE

## 2022-07-02 PROCEDURE — 25000003 PHARM REV CODE 250: Performed by: INTERNAL MEDICINE

## 2022-07-02 PROCEDURE — 96413 CHEMO IV INFUSION 1 HR: CPT

## 2022-07-02 RX ORDER — SODIUM CHLORIDE 0.9 % (FLUSH) 0.9 %
10 SYRINGE (ML) INJECTION
Status: DISCONTINUED | OUTPATIENT
Start: 2022-07-02 | End: 2022-07-02 | Stop reason: HOSPADM

## 2022-07-02 RX ORDER — SODIUM CHLORIDE 9 MG/ML
INJECTION, SOLUTION INTRAVENOUS CONTINUOUS
Status: DISCONTINUED | OUTPATIENT
Start: 2022-07-02 | End: 2022-07-02 | Stop reason: HOSPADM

## 2022-07-02 RX ORDER — HEPARIN 100 UNIT/ML
500 SYRINGE INTRAVENOUS
Status: DISCONTINUED | OUTPATIENT
Start: 2022-07-02 | End: 2022-07-02 | Stop reason: HOSPADM

## 2022-07-02 RX ADMIN — PACLITAXEL 240 MG: 100 INJECTION, POWDER, LYOPHILIZED, FOR SUSPENSION INTRAVENOUS at 09:07

## 2022-07-02 RX ADMIN — Medication 10 ML: at 11:07

## 2022-07-02 RX ADMIN — SODIUM CHLORIDE: 0.9 INJECTION, SOLUTION INTRAVENOUS at 09:07

## 2022-07-02 RX ADMIN — HEPARIN 500 UNITS: 100 SYRINGE at 11:07

## 2022-07-02 NOTE — PLAN OF CARE
Patient tolerated abraxane and IVF today. NAD. PAC accessed with positive blood return. Pt declined AVS, uses [facility]. RTC on 7/14 for labs. Discharged home, ambulated independently.

## 2022-07-05 ENCOUNTER — PATIENT MESSAGE (OUTPATIENT)
Dept: HEMATOLOGY/ONCOLOGY | Facility: CLINIC | Age: 47
End: 2022-07-05
Payer: MEDICARE

## 2022-07-05 DIAGNOSIS — G89.3 NEOPLASM RELATED PAIN: ICD-10-CM

## 2022-07-05 RX ORDER — HYDROCODONE BITARTRATE AND ACETAMINOPHEN 10; 325 MG/1; MG/1
1 TABLET ORAL EVERY 6 HOURS PRN
Qty: 90 TABLET | Refills: 0 | Status: SHIPPED | OUTPATIENT
Start: 2022-07-05 | End: 2022-08-08 | Stop reason: SDUPTHER

## 2022-07-07 ENCOUNTER — PES CALL (OUTPATIENT)
Dept: ADMINISTRATIVE | Facility: CLINIC | Age: 47
End: 2022-07-07
Payer: MEDICARE

## 2022-07-11 ENCOUNTER — SPECIALTY PHARMACY (OUTPATIENT)
Dept: PHARMACY | Facility: CLINIC | Age: 47
End: 2022-07-11
Payer: MEDICARE

## 2022-07-11 NOTE — TELEPHONE ENCOUNTER
Specialty Pharmacy - Refill Coordination    Specialty Medication Orders Linked to Encounter    Flowsheet Row Most Recent Value   Medication #1 alpelisib 300 mg/day (150 mg x 2) Tab (Order#[id number], Rx#[id number])          Refill Questions - Documented Responses    Flowsheet Row Most Recent Value   Patient Availability and HIPAA Verification    Does patient want to proceed with activity? Yes   HIPAA/medical authority confirmed? Yes   Relationship to patient of person spoken to? Self   Refill Screening Questions    Changes to allergies? No   Changes to medications? No   New conditions since last clinic visit? No   Unplanned office visit, urgent care, ED, or hospital admission in the last 4 weeks? No   How does patient/caregiver feel medication is working? Good   Financial problems or insurance changes? No   How many doses of your specialty medications were missed in the last 4 weeks? 1   Would patient like to speak to a pharmacist? No   When does the patient need to receive the medication? 07/18/22   Refill Delivery Questions    How will the patient receive the medication? Delivery [facility]   When does the patient need to receive the medication? 07/18/22   Shipping Address Home   Address in [facility] confirmed and updated if neccessary? Yes   Expected Copay ($) 0   Is the patient able to afford the medication copay? Yes   Payment Method zero copay   Days supply of Refill 28   Supplies needed? No supplies needed   Refill activity completed? Yes   Refill activity plan Refill scheduled   Shipment/Pickup Date: 07/13/22          Current Outpatient Medications   Medication Sig    alpelisib 300 mg/day (150 mg x 2) Tab Take 2 tablets (300 mg) by mouth once daily.    ALPRAZolam (XANAX) 0.5 MG tablet Take 1 tablet (0.5 mg total) by mouth 3 (three) times daily as needed for Insomnia or Anxiety.    amLODIPine (NORVASC) 10 MG tablet TAKE 1 TABLET (10 MG) BY MOUTH EVERY DAY    calcium-vitamin D3 (OYSTER SHELL CALCIUM-VIT  "D3) 500 mg-5 mcg (200 unit) per tablet Take 1 tablet by mouth 2 (two) times daily.    diphenoxylate-atropine 2.5-0.025 mg (LOMOTIL) 2.5-0.025 mg per tablet Take 1 tablet by mouth 4 (four) times daily as needed for Diarrhea.    dulaglutide (TRULICITY) 1.5 mg/0.5 mL PnIj Inject 1.5 mg into the skin once a week. Trulicity 0.75mg weekly for 4 weeks & then increase to 1.5 mg weekly indefinitely. (Patient taking differently: Inject 1.5 mg into the skin once a week. Trulicity 0.75mg weekly for 4 weeks & then increase to 1.5 mg weekly indefinitely.(PATIENT TAKES ON SUNDAYS))    FLUoxetine 20 MG capsule Take 2 capsules (40 mg total) by mouth once daily.    gabapentin (NEURONTIN) 300 MG capsule Take 1 capsule (300 mg total) by mouth 3 (three) times daily.    hydroCHLOROthiazide (HYDRODIURIL) 25 MG tablet TAKE 1 TABLET BY MOUTH ONCE DAILY    HYDROcodone-acetaminophen (NORCO)  mg per tablet Take 1 tablet by mouth every 6 (six) hours as needed for Pain.    insulin detemir U-100 (LEVEMIR FLEXTOUCH U-100 INSULN) 100 unit/mL (3 mL) InPn pen Inject 21 Units into the skin every evening.    lancets 33 gauge Misc 1 lancet by Misc.(Non-Drug; Combo Route) route once daily.    lancing device Misc 1 Device by Misc.(Non-Drug; Combo Route) route 2 (two) times daily with meals.    LIDOcaine-prilocaine (EMLA) cream Apply topically as needed.    losartan (COZAAR) 50 MG tablet Take 2 tablets by mouth once daily    metFORMIN (GLUCOPHAGE) 500 MG tablet Take 2 tablets (1,000 mg total) by mouth 2 (two) times daily with meals. Needs appointment for future refills    ondansetron (ZOFRAN-ODT) 8 MG TbDL Take 1 tablet (8 mg total) by mouth every 8 (eight) hours as needed (nausea).    pen needle, diabetic (BD ULTRA-FINE TANESHA PEN NEEDLE) 32 gauge x 5/32" Ndle Use as directed with novolog and levemir    tadalafiL (CIALIS) 5 MG tablet Take 2 tablets (10 mg total) by mouth daily as needed for Erectile Dysfunction.   Last reviewed on " 6/23/2022  1:59 PM by Matthew Sandoval, PhD    Review of patient's allergies indicates:  No Known Allergies Last reviewed on  7/2/2022 9:13 AM by Heidy Michel      Tasks added this encounter   8/8/2022 - Refill Call (Auto Added)   Tasks due within next 3 months   No tasks due.     Corina Montero, Patient Care Assistant  [facility]  [address] [place], [place] [address]  [facility] [address]  Phone: [phone]  Fax: [phone]

## 2022-07-12 ENCOUNTER — TELEPHONE (OUTPATIENT)
Dept: INFUSION THERAPY | Facility: HOSPITAL | Age: 47
End: 2022-07-12
Payer: MEDICARE

## 2022-07-13 ENCOUNTER — TELEPHONE (OUTPATIENT)
Dept: HEMATOLOGY/ONCOLOGY | Facility: CLINIC | Age: 47
End: 2022-07-13
Payer: MEDICARE

## 2022-07-13 NOTE — PROGRESS NOTES
Subjective:       Patient ID: Paul Li Jr. is a 46 y.o. male.    Chief Complaint: No chief complaint on file.    HPI     Returns for follow up for chemotherapy C12D1 (Abraxane and PO Aplelisib)  Emotionally stressed today  Otherwise feels good physically    No fevers, chills or infectious complaints  No pain issues.   Weight stable  Lymphedema stable and wearing sleeve  Less focus and has reached out to palliative team for assistance    Most recent imaging-   - 6/6/2022 PET scan:  FINDINGS:  Quality of the study: Adequate.  In the head and neck, there is nonspecific focal uptake in the right infratemporal fossa without CT correlate which may reflect atypically focal muscular uptake.  There are no other hypermetabolic lesions worrisome for malignancy. There are no hypermetabolic mucosal lesions, and there are no pathologically enlarged or hypermetabolic lymph nodes.  In the chest, there are no hypermetabolic lesions worrisome for malignancy.  No pathologically enlarged or hypermetabolic lymph nodes.  Stable subcentimeter right pulmonary nodules too small to characterize on PET (images 84 and 85). Postsurgical changes of right mastectomy and axillary node dissection.  In the abdomen and pelvis, there is physiologic tracer distribution within the abdominal organs and excretion into the genitourinary system.  Diffuse sigmoid mild uptake, likely related to metformin use or physiologic activity.  There is sigmoid diverticulosis without CT evidence of diverticulitis.  In the bones, there are hypermetabolic lesions at the T12 and T7 vertebral bodies.  Uptake at the T12 vertebral body measures 5.8 SUV max corresponding to a region of prior sclerosis and T7 with an SUV of 4.4 within the body away from focal sclerosis on the right.  Other sclerotic lesions demonstrate uptake similar to normal marrow activity.  In the extremities, there are no hypermetabolic lesions worrisome for malignancy.  Additional CT findings:  Bilateral inguinal hernias.  Large left hydrocele.  Impression:  Interval increased uptake in the T7 body away from sclerosis and T12 body involving a region of prior sclerosis.  Differential considerations for T7 include focal nodular hyperplasia of marrow versus early, CT occult metastasis, and differential considerations for T12 include active osteoblastic metastasis versus healing metastasis.  Other sclerotic lesions demonstrate uptake similar to normal marrow suggesting response to therapy.  Nonspecific focal uptake at the right temporal fossa without CT correlate may indicate atypically focal muscular uptake.  Attention on follow-up     Diagnosis:  Metastatic TNBC progressed (prior Stage IB (L8hG8R5) triple negative invasive ductal carcinoma with lobular features of right breast, retroareolar, Grade 2, Ki67 80%, diagnosed 2019)     Oncology History:  Metastatic  Set up for scans (due to back pain) which are consistent for recurrence.   Imagin/3/2021 MRI T/L spine:  FINDINGS:  Alignment: Within normal limits.  Vertebrae: Low T1 signal intensity in the left T12 vertebral body extending to the left pedicle, S1 and S2 vertebral bodies with abnormal enhancement.  The vertebral heights are well maintained.  No evidence of acute fractures.  Abnormal appearance of the LEFT sacrum and ilium as well.  Discs: Degenerative disease of the level of L4-L5 with posterior annular fissure.  Conus appears within normal limits terminating at the level of the L2. level.  Cauda equina appears unremarkable.  Mild circumferential disc bulging at the level of T10-T11 abutting the ventral thecal sac.  No significant spinal canal stenosis or neural foraminal narrowing throughout the thoracic spine.  No significant spinal canal stenosis or neural foraminal narrowing throughout the lumbar spine.  Paraspinous muscles and soft tissues: Subcentimeter focal enhancement in the posterior column along the supraspinous ligament at the  level of L1-L2 (sagittal series 21, image 10) potentially inflammatory versus neoplastic.  Impression:  Abnormal appearance of the T12, S1, S2 vertebral bodies as well as LEFT sacrum and ilium concerning for metastatic disease in this patient with history of breast cancer.  No evidence for significant central canal narrowing.  Additional findings, as above.  This report was flagged in Epic as abnormal.     6/9/2021 PET scan:  COMPARISON:  MRI thoracic and lumbar spine 06/03/2021  FINDINGS:  Quality of the study: Adequate.  In the head and neck, there are no hypermetabolic lesions worrisome for malignancy. There are no hypermetabolic mucosal lesions, and there are no pathologically enlarged or hypermetabolic lymph nodes.  In the chest, there is postoperative change of right mastectomy/right axillary lymph node dissection.  There is low level hypermetabolic activity with mild soft tissue thickening in the skin/superficial subcutaneous fat of the right chest wall (axial fused image 78) with SUV max 3.6.  There is soft tissue thickening measuring approximately 1.8 x 7.9 cm in the subcutaneous fat of the right chest wall (axial series 3, image 84) with SUV max 3.1.  There are a few bilateral pulmonary nodules measuring up to 0.3 cm in the left lung (axial series 3, image 88) and 0.5 cm in the right lung (axial series 3, image 84).  These nodules are too small to characterize by PET.  There are no pathologically enlarged or hypermetabolic lymph nodes.  In the abdomen and pelvis, there is physiologic tracer distribution within the abdominal organs and excretion into the genitourinary system.  Subtle sen mesentery and multiple prominent mesenteric lymph nodes measuring up to 1.8 cm in long axis with background activity.  In the bones, there are several hypermetabolic lesions worrisome for malignancy.  Sclerotic lesion in the left T12 vertebral body (axial series 3, image 131) with SUV max 12.  Sclerotic lesions in the  sacrum (for example axial series 3, image 188) with SUV max 9.7.  Sclerotic lesions in the left iliac bone (for example axial series 3, image 205) with SUV max 6.  In the extremities, there are no hypermetabolic lesions worrisome for malignancy.  Incidental findings:  Bilateral maxillary antra mucous retention cysts.  Fat containing right inguinal hernia.  Impression:  1. In this patient with right breast carcinoma status post mastectomy/right axillary lymph node dissection, there are multiple sclerotic lesions in the T12 vertebral body, sacrum, and left iliac bone with hypermetabolic activity concerning for active metastatic disease and in keeping with findings on MRI 06/03/2021.  2. Additionally there is low-level hypermetabolic activity with soft tissue thickening in the right chest wall as detailed above.  These findings are nonspecific and may be related to postoperative/post radiation change, with recurrent malignancy not excluded.  3. Nonspecific mesenteric haziness and lymph nodes which may indicate mesenteric adenitis.  Recommend clinical correlation and attention on follow-up.  4. Additional findings as above.  I, Sohan Tillman MD, attest that I reviewed and interpreted the images.     In summary,   Started aplelisib 8/22/2021   Abraxane C1 started 8/13/2021  We had sent Guardant and discussed results with Molecular tumor board  He also had a repeat bx to confirm metastatic triple negative breast cancer  Plan:   Nab-Paclitaxel and Alpelisib  Reference: https://ascopubs.org/doi/abs/10.1200/JCO.[email or web address]_suppl.1018  Also on Zometa-      - C1D1 Abraxane 8/13/2021  - Started aplelisib 8/22/2021               Oncology History   Malignant neoplasm involving both nipple and areola of right breast in male, estrogen receptor negative   5/1/2019 Imaging Significant Findings     Mammogram and US  Impression:  Right  Mass: Right breast 14 mm mass at the retroareolar anterior position. Assessment: 4 - Suspicious  finding. Biopsy is recommended.   Left  There is no mammographic or sonographic evidence of malignancy.  Recommendation:  Biopsy is recommended.      5/2/2019 Biopsy     BREAST, RIGHT, RETROAREOLAR MASS, ULTRASOUND-GUIDED BIOPSY:  Invasive ductal carcinoma with lobular features.  Size of invasive carcinoma: 5 MM in greatest linear dimension within a single      5/2/2019 Breast Tumor Markers     Estrogen: Negative  Progesterone: Negative  HER2: Negative  Ki67: 80      5/17/2019 Cancer Staged     Staging form: Breast, AJCC 8th Edition  - Clinical stage from 5/17/2019: Stage IB (cT1c, cN0, cM0, G2, ER-, NH-, HER2-) - Signed by Richa Bahena MD on 5/17/2019 5/27/2019 - 7/21/2019 Chemotherapy     Treatment Summary   Plan Name: OP BREAST DOSE-DENSE AC - DOXORUBICIN CYCLOPHOSPHAMIDE Q2W  Treatment Goal: Curative  Status: Inactive  Start Date: 5/27/2019  End Date: 7/9/2019  Provider: Richa Bahena MD  Chemotherapy: DOXOrubicin chemo injection 186 mg, 60 mg/m2 = 186 mg, Intravenous, Clinic/HOD 1 time, 4 of 4 cycles  Administration: 186 mg (5/27/2019), 186 mg (6/10/2019), 186 mg (6/24/2019), 186 mg (7/8/2019)  cyclophosphamide (CYTOXAN) 600 mg/m2 = 1,865 mg in sodium chloride 0.9% 250 mL chemo infusion, 600 mg/m2 = 1,865 mg, Intravenous, Clinic/HOD 1 time, 4 of 4 cycles  Administration: 1,865 mg (5/27/2019), 1,865 mg (6/10/2019), 1,865 mg (6/24/2019), 1,865 mg (7/8/2019)      7/22/2019 - 10/15/2019 Chemotherapy     Treatment Summary   Plan Name: OP PACLITAXEL QW  Treatment Goal: Curative  Status: Inactive  Start Date: 7/24/2019  End Date: 10/8/2019  Provider: Richa Bahena MD  Chemotherapy: PACLitaxel (TAXOL) 80 mg/m2 = 246 mg in sodium chloride 0.9% 250 mL chemo infusion, 80 mg/m2 = 246 mg, Intravenous, Clinic/HOD 1 time, 12 of 12 cycles  Dose modification: 60 mg/m2 (original dose 80 mg/m2, Cycle 3), 55 mg/m2 (original dose 80 mg/m2, Cycle 7), 60 mg/m2 (original dose 80 mg/m2, Cycle 7), 50 mg/m2 (original  dose 80 mg/m2, Cycle 9), 50 mg/m2 (original dose 80 mg/m2, Cycle 10)  Administration: 246 mg (7/24/2019), 246 mg (7/31/2019), 186 mg (8/7/2019), 186 mg (8/14/2019), 186 mg (8/21/2019), 186 mg (8/28/2019), 186 mg (9/4/2019), 174 mg (9/11/2019), 156 mg (9/18/2019), 156 mg (9/25/2019), 156 mg (10/1/2019), 156 mg (10/8/2019)      11/22/2019 Breast Surgery     On 11/22/19 Dr whyte performed a right breast mastectomy with SLN biopsy with pathology showing grade 2, 6 mm IDC with extensive treatment effect, no LVI with 1 LN positive 4 mm, negative margins. The on 12/17/19, Dr Whyte performed right axillary dissection with pathology still pending.      11/22/2019 Cancer Staged     ypT1b N1      12/17/2019 Breast Surgery     Surgery: Dr Shima Whyte, [phone] performed right ALND with pathology showing 14 negative lymph nodes.             1/14/2020 Tumor Conference      Post mastectomy radiation therapy: Xeloda, then possible Keytruda trial .      2/3/2020 - 3/23/2020 Radiation Therapy     Treating physician: Speedy Nair MD   Total Dose: 50.4 Gy to the chest wall and regional lymphatics  10 Gy boost to the prostate.   Fractions: 28 fractions at 1.8 Gy per fraction  5 fractions at 2 Gy per fraction       2/28/2020 -  Chemotherapy     * 4/15/2020 - 9/28/20 completed 6 cycles adjuvant Xeloda 1000 mg/m2 bid days 1-14 every 21 days  Treatment Summary   Plan Name: OP CAPECITABINE 1250MG/M2 BID Q3W  Treatment Goal: Curative  Status: Active  Start Date: 3/20/2020  End Date: 3/20/2020  Provider: Richa Bahena MD  Chemotherapy: [No matching medication found in this treatment plan]        Review of Systems   Constitutional: Negative for activity change, appetite change, chills, fatigue, fever and unexpected weight change.   HENT: Negative for dental problem, postnasal drip, rhinorrhea, sinus pressure/congestion, sore throat, trouble swallowing and voice change.    Eyes: Negative for visual disturbance.   Respiratory:  Negative for cough, shortness of breath and wheezing.    Cardiovascular: Positive for leg swelling (wears compression socks, improving). Negative for chest pain and palpitations.   Gastrointestinal: Negative for abdominal distention, abdominal pain, blood in stool, change in bowel habit, constipation, diarrhea, nausea, vomiting and change in bowel habit.   Endocrine: Negative for cold intolerance and heat intolerance.   Genitourinary: Negative for decreased urine volume, difficulty urinating, dysuria, frequency, hematuria and urgency.   Musculoskeletal: Negative for arthralgias, back pain, gait problem, joint swelling, myalgias, neck pain and neck stiffness.   Integumentary:  Negative for color change, pallor, rash and wound.   Neurological: Positive for numbness (improved neuropathy). Negative for dizziness, weakness, light-headedness and headaches.   Hematological: Negative for adenopathy. Does not bruise/bleed easily.   Psychiatric/Behavioral: Positive for dysphoric mood. Negative for sleep disturbance. The patient is not nervous/anxious.          Objective:      Physical Exam  Vitals and nursing note reviewed.   Constitutional:       General: He is not in acute distress.     Appearance: Normal appearance. He is well-developed. He is obese. He is not diaphoretic.      Comments: Presents alone  Very pleasant  ECOG= 0   HENT:      Head: Normocephalic and atraumatic.   Eyes:      General: No scleral icterus.     Extraocular Movements: Extraocular movements intact.      Conjunctiva/sclera: Conjunctivae normal.      Pupils: Pupils are equal, round, and reactive to light.   Neck:      Thyroid: No thyromegaly.      Trachea: No tracheal deviation.   Cardiovascular:      Rate and Rhythm: Normal rate and regular rhythm.      Heart sounds: Normal heart sounds. No murmur heard.    No friction rub. No gallop.   Pulmonary:      Effort: Pulmonary effort is normal. No respiratory distress.      Breath sounds: Normal breath  sounds. No wheezing, rhonchi or rales.   Chest:      Chest wall: No tenderness.   Abdominal:      General: Bowel sounds are normal. There is no distension.      Palpations: Abdomen is soft. There is no mass.      Tenderness: There is no abdominal tenderness. There is no guarding or rebound.      Comments: No organomegaly   Musculoskeletal:         General: Swelling (chronic RUE lymphedema) present. No tenderness or deformity. Normal range of motion.      Cervical back: Normal range of motion and neck supple. No rigidity or tenderness.      Right lower leg: Edema (trace, chronic) present.      Left lower leg: Edema (trace, chronic) present.      Comments: Left > right edema LE   Lymphadenopathy:      Cervical: No cervical adenopathy.   Skin:     General: Skin is warm and dry.      Coloration: Skin is not jaundiced or pale.      Findings: No erythema or rash.   Neurological:      General: No focal deficit present.      Mental Status: He is alert and oriented to person, place, and time. Mental status is at baseline.      Cranial Nerves: No cranial nerve deficit.      Sensory: No sensory deficit.      Motor: No weakness or abnormal muscle tone.      Coordination: Coordination normal.      Gait: Gait normal.      Deep Tendon Reflexes: Reflexes are normal and symmetric. Reflexes normal.   Psychiatric:         Mood and Affect: Mood normal.         Behavior: Behavior normal.         Thought Content: Thought content normal.         Judgment: Judgment normal.       Labs- reviewed  Assessment:       Problem List Items Addressed This Visit     Malignant neoplasm involving both nipple and areola of right breast in male, estrogen receptor negative - Primary    Bone metastases    Anemia associated with chemotherapy          Plan:       Doing well on current regimen  Labs adequate  Knows to call with any issues    Route Chart for Scheduling    Med Onc Chart Routing      Follow up with physician    Follow up with [facility] . Already has  7/22 and 7/29 chemo set up with labs prior- needs EP and labs- cbc cmp 8/11 and chemo 8/12- zometa 7/24   Infusion scheduling note    Injection scheduling note    Labs    Imaging    Pharmacy appointment    Other referrals          Treatment Plan Information   OP BREAST NAB-PACLITAXEL D1, D8, D15 + ALPELISIB    Rosa Linn MD   Upcoming Treatment Dates - OP BREAST NAB-PACLITAXEL D1, D8, D15 + ALPELISIB     7/15/2022       Chemotherapy       PACLitaxeL protein-bound (ABRAXANE) 80 mg/m2 = 240 mg in 48 mL infusion  7/22/2022       Chemotherapy       PACLitaxeL protein-bound (ABRAXANE) 80 mg/m2 = 240 mg in 48 mL infusion  7/29/2022       Chemotherapy       PACLitaxeL protein-bound (ABRAXANE) 80 mg/m2 = 240 mg in 48 mL infusion    Supportive Plan Information  OP FILGRASTIM 480 MCG   Michelle Grayson NP   Upcoming Treatment Dates - OP FILGRASTIM 480 MCG    No upcoming days in selected categories.    Therapy Plan Information  PORT FLUSH  Flushes  heparin, porcine (PF) 100 unit/mL injection flush 500 Units  500 Units, Intravenous, Every visit  sodium chloride 0.9% flush 10 mL  10 mL, Intravenous, Every visit    ZOLEDRONIC ACID (ZOMETA) IV  Medications  zoledronic 4 mg/100 mL infusion 4 mg  4 mg, Intravenous, Every 4 weeks  Flushes  sodium chloride 0.9% 250 mL flush bag  Intravenous, Every 4 weeks  sodium chloride 0.9% flush 10 mL  10 mL, Intravenous, Every 4 weeks  heparin, porcine (PF) 100 unit/mL injection flush 500 Units  500 Units, Intravenous, Every 4 weeks  alteplase injection 2 mg  2 mg, Intra-Catheter, Every 4 weeks

## 2022-07-14 ENCOUNTER — LAB VISIT (OUTPATIENT)
Dept: LAB | Facility: HOSPITAL | Age: 47
End: 2022-07-14
Attending: INTERNAL MEDICINE
Payer: MEDICARE

## 2022-07-14 ENCOUNTER — PATIENT MESSAGE (OUTPATIENT)
Dept: PALLIATIVE MEDICINE | Facility: CLINIC | Age: 47
End: 2022-07-14
Payer: MEDICARE

## 2022-07-14 ENCOUNTER — OFFICE VISIT (OUTPATIENT)
Dept: HEMATOLOGY/ONCOLOGY | Facility: CLINIC | Age: 47
End: 2022-07-14
Payer: MEDICARE

## 2022-07-14 VITALS
TEMPERATURE: 98 F | WEIGHT: 315 LBS | DIASTOLIC BLOOD PRESSURE: 75 MMHG | BODY MASS INDEX: 40.43 KG/M2 | SYSTOLIC BLOOD PRESSURE: 131 MMHG | HEIGHT: 74 IN | HEART RATE: 61 BPM | OXYGEN SATURATION: 97 %

## 2022-07-14 DIAGNOSIS — C79.51 BONE METASTASES: ICD-10-CM

## 2022-07-14 DIAGNOSIS — C50.021 MALIGNANT NEOPLASM INVOLVING BOTH NIPPLE AND AREOLA OF RIGHT BREAST IN MALE, ESTROGEN RECEPTOR NEGATIVE: Primary | ICD-10-CM

## 2022-07-14 DIAGNOSIS — Z17.1 MALIGNANT NEOPLASM INVOLVING BOTH NIPPLE AND AREOLA OF RIGHT BREAST IN MALE, ESTROGEN RECEPTOR NEGATIVE: ICD-10-CM

## 2022-07-14 DIAGNOSIS — C50.021 MALIGNANT NEOPLASM INVOLVING BOTH NIPPLE AND AREOLA OF RIGHT BREAST IN MALE, ESTROGEN RECEPTOR NEGATIVE: ICD-10-CM

## 2022-07-14 DIAGNOSIS — D64.81 ANEMIA ASSOCIATED WITH CHEMOTHERAPY: ICD-10-CM

## 2022-07-14 DIAGNOSIS — T45.1X5A ANEMIA ASSOCIATED WITH CHEMOTHERAPY: ICD-10-CM

## 2022-07-14 DIAGNOSIS — E11.65 UNCONTROLLED TYPE 2 DIABETES MELLITUS WITH HYPERGLYCEMIA: ICD-10-CM

## 2022-07-14 DIAGNOSIS — R19.7 DIARRHEA, UNSPECIFIED TYPE: ICD-10-CM

## 2022-07-14 DIAGNOSIS — Z17.1 MALIGNANT NEOPLASM INVOLVING BOTH NIPPLE AND AREOLA OF RIGHT BREAST IN MALE, ESTROGEN RECEPTOR NEGATIVE: Primary | ICD-10-CM

## 2022-07-14 LAB
ALBUMIN SERPL BCP-MCNC: 3.7 G/DL (ref 3.5–5.2)
ALP SERPL-CCNC: 81 U/L (ref 55–135)
ALT SERPL W/O P-5'-P-CCNC: 31 U/L (ref 10–44)
ANION GAP SERPL CALC-SCNC: 5 MMOL/L (ref 8–16)
AST SERPL-CCNC: 24 U/L (ref 10–40)
BASOPHILS # BLD AUTO: 0.02 K/UL (ref 0–0.2)
BASOPHILS NFR BLD: 0.5 % (ref 0–1.9)
BILIRUB SERPL-MCNC: 0.6 MG/DL (ref 0.1–1)
BUN SERPL-MCNC: 8 MG/DL (ref 6–20)
CALCIUM SERPL-MCNC: 8.3 MG/DL (ref 8.7–10.5)
CHLORIDE SERPL-SCNC: 109 MMOL/L (ref 95–110)
CO2 SERPL-SCNC: 24 MMOL/L (ref 23–29)
CREAT SERPL-MCNC: 1.1 MG/DL (ref 0.5–1.4)
DIFFERENTIAL METHOD: ABNORMAL
EOSINOPHIL # BLD AUTO: 0 K/UL (ref 0–0.5)
EOSINOPHIL NFR BLD: 0.2 % (ref 0–8)
ERYTHROCYTE [DISTWIDTH] IN BLOOD BY AUTOMATED COUNT: 17 % (ref 11.5–14.5)
EST. GFR  (AFRICAN AMERICAN): >60 ML/MIN/1.73 M^2
EST. GFR  (NON AFRICAN AMERICAN): >60 ML/MIN/1.73 M^2
GLUCOSE SERPL-MCNC: 130 MG/DL (ref 70–110)
HCT VFR BLD AUTO: 33.9 % (ref 40–54)
HGB BLD-MCNC: 10.6 G/DL (ref 14–18)
IMM GRANULOCYTES # BLD AUTO: 0.03 K/UL (ref 0–0.04)
IMM GRANULOCYTES NFR BLD AUTO: 0.7 % (ref 0–0.5)
LYMPHOCYTES # BLD AUTO: 1.8 K/UL (ref 1–4.8)
LYMPHOCYTES NFR BLD: 41.7 % (ref 18–48)
MCH RBC QN AUTO: 25.1 PG (ref 27–31)
MCHC RBC AUTO-ENTMCNC: 31.3 G/DL (ref 32–36)
MCV RBC AUTO: 80 FL (ref 82–98)
MONOCYTES # BLD AUTO: 0.3 K/UL (ref 0.3–1)
MONOCYTES NFR BLD: 8.1 % (ref 4–15)
NEUTROPHILS # BLD AUTO: 2.1 K/UL (ref 1.8–7.7)
NEUTROPHILS NFR BLD: 48.8 % (ref 38–73)
NRBC BLD-RTO: 0 /100 WBC
PLATELET # BLD AUTO: 236 K/UL (ref 150–450)
PMV BLD AUTO: 10.8 FL (ref 9.2–12.9)
POTASSIUM SERPL-SCNC: 4.1 MMOL/L (ref 3.5–5.1)
PROT SERPL-MCNC: 7.1 G/DL (ref 6–8.4)
RBC # BLD AUTO: 4.22 M/UL (ref 4.6–6.2)
SODIUM SERPL-SCNC: 138 MMOL/L (ref 136–145)
WBC # BLD AUTO: 4.2 K/UL (ref 3.9–12.7)

## 2022-07-14 PROCEDURE — 99999 PR PBB SHADOW E&M-EST. PATIENT-LVL IV: CPT | Mod: PBBFAC,,, | Performed by: INTERNAL MEDICINE

## 2022-07-14 PROCEDURE — 1160F PR REVIEW ALL MEDS BY PRESCRIBER/CLIN PHARMACIST DOCUMENTED: ICD-10-PCS | Mod: CPTII,S$GLB,, | Performed by: INTERNAL MEDICINE

## 2022-07-14 PROCEDURE — 3078F DIAST BP <80 MM HG: CPT | Mod: CPTII,S$GLB,, | Performed by: INTERNAL MEDICINE

## 2022-07-14 PROCEDURE — 3044F PR MOST RECENT HEMOGLOBIN A1C LEVEL <7.0%: ICD-10-PCS | Mod: CPTII,S$GLB,, | Performed by: INTERNAL MEDICINE

## 2022-07-14 PROCEDURE — 1159F PR MEDICATION LIST DOCUMENTED IN MEDICAL RECORD: ICD-10-PCS | Mod: CPTII,S$GLB,, | Performed by: INTERNAL MEDICINE

## 2022-07-14 PROCEDURE — 3075F PR MOST RECENT SYSTOLIC BLOOD PRESS GE 130-139MM HG: ICD-10-PCS | Mod: CPTII,S$GLB,, | Performed by: INTERNAL MEDICINE

## 2022-07-14 PROCEDURE — 3008F BODY MASS INDEX DOCD: CPT | Mod: CPTII,S$GLB,, | Performed by: INTERNAL MEDICINE

## 2022-07-14 PROCEDURE — 36415 COLL VENOUS BLD VENIPUNCTURE: CPT | Performed by: INTERNAL MEDICINE

## 2022-07-14 PROCEDURE — 4010F PR ACE/ARB THEARPY RXD/TAKEN: ICD-10-PCS | Mod: CPTII,S$GLB,, | Performed by: INTERNAL MEDICINE

## 2022-07-14 PROCEDURE — 80053 COMPREHEN METABOLIC PANEL: CPT | Performed by: INTERNAL MEDICINE

## 2022-07-14 PROCEDURE — 3044F HG A1C LEVEL LT 7.0%: CPT | Mod: CPTII,S$GLB,, | Performed by: INTERNAL MEDICINE

## 2022-07-14 PROCEDURE — 99214 PR OFFICE/OUTPT VISIT, EST, LEVL IV, 30-39 MIN: ICD-10-PCS | Mod: S$GLB,,, | Performed by: INTERNAL MEDICINE

## 2022-07-14 PROCEDURE — 3008F PR BODY MASS INDEX (BMI) DOCUMENTED: ICD-10-PCS | Mod: CPTII,S$GLB,, | Performed by: INTERNAL MEDICINE

## 2022-07-14 PROCEDURE — 1159F MED LIST DOCD IN RCRD: CPT | Mod: CPTII,S$GLB,, | Performed by: INTERNAL MEDICINE

## 2022-07-14 PROCEDURE — 1160F RVW MEDS BY RX/DR IN RCRD: CPT | Mod: CPTII,S$GLB,, | Performed by: INTERNAL MEDICINE

## 2022-07-14 PROCEDURE — 99214 OFFICE O/P EST MOD 30 MIN: CPT | Mod: S$GLB,,, | Performed by: INTERNAL MEDICINE

## 2022-07-14 PROCEDURE — 85025 COMPLETE CBC W/AUTO DIFF WBC: CPT | Performed by: INTERNAL MEDICINE

## 2022-07-14 PROCEDURE — 99999 PR PBB SHADOW E&M-EST. PATIENT-LVL IV: ICD-10-PCS | Mod: PBBFAC,,, | Performed by: INTERNAL MEDICINE

## 2022-07-14 PROCEDURE — 4010F ACE/ARB THERAPY RXD/TAKEN: CPT | Mod: CPTII,S$GLB,, | Performed by: INTERNAL MEDICINE

## 2022-07-14 PROCEDURE — 3078F PR MOST RECENT DIASTOLIC BLOOD PRESSURE < 80 MM HG: ICD-10-PCS | Mod: CPTII,S$GLB,, | Performed by: INTERNAL MEDICINE

## 2022-07-14 PROCEDURE — 3075F SYST BP GE 130 - 139MM HG: CPT | Mod: CPTII,S$GLB,, | Performed by: INTERNAL MEDICINE

## 2022-07-14 RX ORDER — SODIUM CHLORIDE 0.9 % (FLUSH) 0.9 %
10 SYRINGE (ML) INJECTION
Status: CANCELLED | OUTPATIENT
Start: 2022-07-15

## 2022-07-14 RX ORDER — METFORMIN HYDROCHLORIDE 500 MG/1
1000 TABLET ORAL 2 TIMES DAILY WITH MEALS
Qty: 120 TABLET | Refills: 0 | OUTPATIENT
Start: 2022-07-14 | End: 2022-08-13

## 2022-07-14 RX ORDER — SODIUM CHLORIDE 9 MG/ML
INJECTION, SOLUTION INTRAVENOUS CONTINUOUS
Status: CANCELLED | OUTPATIENT
Start: 2022-07-15

## 2022-07-14 RX ORDER — DIPHENOXYLATE HYDROCHLORIDE AND ATROPINE SULFATE 2.5; .025 MG/1; MG/1
1 TABLET ORAL 4 TIMES DAILY PRN
Qty: 60 TABLET | Refills: 2 | Status: SHIPPED | OUTPATIENT
Start: 2022-07-14 | End: 2022-11-16 | Stop reason: SDUPTHER

## 2022-07-14 RX ORDER — HEPARIN 100 UNIT/ML
500 SYRINGE INTRAVENOUS
Status: CANCELLED | OUTPATIENT
Start: 2022-07-15

## 2022-07-15 ENCOUNTER — INFUSION (OUTPATIENT)
Dept: INFUSION THERAPY | Facility: HOSPITAL | Age: 47
End: 2022-07-15
Attending: INTERNAL MEDICINE
Payer: MEDICARE

## 2022-07-15 VITALS
TEMPERATURE: 99 F | HEART RATE: 54 BPM | RESPIRATION RATE: 18 BRPM | DIASTOLIC BLOOD PRESSURE: 76 MMHG | SYSTOLIC BLOOD PRESSURE: 124 MMHG

## 2022-07-15 DIAGNOSIS — Z17.1 MALIGNANT NEOPLASM INVOLVING BOTH NIPPLE AND AREOLA OF RIGHT BREAST IN MALE, ESTROGEN RECEPTOR NEGATIVE: Primary | ICD-10-CM

## 2022-07-15 DIAGNOSIS — C50.021 MALIGNANT NEOPLASM INVOLVING BOTH NIPPLE AND AREOLA OF RIGHT BREAST IN MALE, ESTROGEN RECEPTOR NEGATIVE: Primary | ICD-10-CM

## 2022-07-15 PROCEDURE — 63600175 PHARM REV CODE 636 W HCPCS: Mod: JW,JG | Performed by: INTERNAL MEDICINE

## 2022-07-15 PROCEDURE — 96361 HYDRATE IV INFUSION ADD-ON: CPT

## 2022-07-15 PROCEDURE — 25000003 PHARM REV CODE 250: Performed by: INTERNAL MEDICINE

## 2022-07-15 PROCEDURE — A4216 STERILE WATER/SALINE, 10 ML: HCPCS | Performed by: INTERNAL MEDICINE

## 2022-07-15 PROCEDURE — 96413 CHEMO IV INFUSION 1 HR: CPT

## 2022-07-15 RX ORDER — SODIUM CHLORIDE 9 MG/ML
INJECTION, SOLUTION INTRAVENOUS CONTINUOUS
Status: DISCONTINUED | OUTPATIENT
Start: 2022-07-15 | End: 2022-07-15 | Stop reason: HOSPADM

## 2022-07-15 RX ORDER — HEPARIN 100 UNIT/ML
500 SYRINGE INTRAVENOUS
Status: DISCONTINUED | OUTPATIENT
Start: 2022-07-15 | End: 2022-07-15 | Stop reason: HOSPADM

## 2022-07-15 RX ORDER — SODIUM CHLORIDE 0.9 % (FLUSH) 0.9 %
10 SYRINGE (ML) INJECTION
Status: DISCONTINUED | OUTPATIENT
Start: 2022-07-15 | End: 2022-07-15 | Stop reason: HOSPADM

## 2022-07-15 RX ADMIN — HEPARIN SODIUM (PORCINE) LOCK FLUSH IV SOLN 100 UNIT/ML 500 UNITS: 100 SOLUTION at 01:07

## 2022-07-15 RX ADMIN — Medication 10 ML: at 01:07

## 2022-07-15 RX ADMIN — SODIUM CHLORIDE: 0.9 INJECTION, SOLUTION INTRAVENOUS at 11:07

## 2022-07-15 RX ADMIN — PACLITAXEL 240 MG: 100 INJECTION, POWDER, LYOPHILIZED, FOR SUSPENSION INTRAVENOUS at 12:07

## 2022-07-15 NOTE — PLAN OF CARE
Problem: Adult Inpatient Plan of Care  Goal: Optimal Comfort and Wellbeing  Intervention: Provide Person-Centered Care  Flowsheets (Taken 7/15/2022 1123)  Trust Relationship/Rapport:   care explained   questions answered   choices provided   questions encouraged   emotional support provided   reassurance provided   empathic listening provided   thoughts/feelings acknowledged

## 2022-07-15 NOTE — PLAN OF CARE
Patient tolerated Abraxane/ivfs well today. Port + blood return present, flushed, hep locked and deaccessed. Overall states feeling good. NAD noted upon discharge. Discharged home, ambulated independently.

## 2022-07-19 NOTE — Clinical Note
- Add Neulasta injection tomorrow- MD in 2 weeks with port labs cbc, cmp, ddAC with next day Neulasta no chest pain/no cough/no diaphoresis/no edema/no fever/no headache/no hemoptysis/no wheezing

## 2022-07-21 DIAGNOSIS — C79.51 BONE METASTASES: Primary | ICD-10-CM

## 2022-07-22 ENCOUNTER — INFUSION (OUTPATIENT)
Dept: INFUSION THERAPY | Facility: HOSPITAL | Age: 47
End: 2022-07-22
Attending: INTERNAL MEDICINE
Payer: MEDICARE

## 2022-07-22 VITALS
BODY MASS INDEX: 40.43 KG/M2 | TEMPERATURE: 98 F | SYSTOLIC BLOOD PRESSURE: 139 MMHG | HEIGHT: 74 IN | DIASTOLIC BLOOD PRESSURE: 76 MMHG | OXYGEN SATURATION: 99 % | HEART RATE: 60 BPM | WEIGHT: 315 LBS | RESPIRATION RATE: 18 BRPM

## 2022-07-22 DIAGNOSIS — C50.021 MALIGNANT NEOPLASM INVOLVING BOTH NIPPLE AND AREOLA OF RIGHT BREAST IN MALE, ESTROGEN RECEPTOR NEGATIVE: Primary | ICD-10-CM

## 2022-07-22 DIAGNOSIS — Z17.1 MALIGNANT NEOPLASM INVOLVING BOTH NIPPLE AND AREOLA OF RIGHT BREAST IN MALE, ESTROGEN RECEPTOR NEGATIVE: Primary | ICD-10-CM

## 2022-07-22 PROCEDURE — 96360 HYDRATION IV INFUSION INIT: CPT

## 2022-07-22 PROCEDURE — 25000003 PHARM REV CODE 250: Performed by: INTERNAL MEDICINE

## 2022-07-22 PROCEDURE — A4216 STERILE WATER/SALINE, 10 ML: HCPCS | Performed by: INTERNAL MEDICINE

## 2022-07-22 PROCEDURE — 96413 CHEMO IV INFUSION 1 HR: CPT

## 2022-07-22 PROCEDURE — 96361 HYDRATE IV INFUSION ADD-ON: CPT

## 2022-07-22 PROCEDURE — 63600175 PHARM REV CODE 636 W HCPCS: Mod: JG | Performed by: INTERNAL MEDICINE

## 2022-07-22 RX ORDER — SODIUM CHLORIDE 9 MG/ML
INJECTION, SOLUTION INTRAVENOUS CONTINUOUS
Status: DISCONTINUED | OUTPATIENT
Start: 2022-07-22 | End: 2022-07-22 | Stop reason: HOSPADM

## 2022-07-22 RX ORDER — SODIUM CHLORIDE 0.9 % (FLUSH) 0.9 %
10 SYRINGE (ML) INJECTION
Status: DISCONTINUED | OUTPATIENT
Start: 2022-07-22 | End: 2022-07-22 | Stop reason: HOSPADM

## 2022-07-22 RX ORDER — HEPARIN 100 UNIT/ML
500 SYRINGE INTRAVENOUS
Status: DISCONTINUED | OUTPATIENT
Start: 2022-07-22 | End: 2022-07-22 | Stop reason: HOSPADM

## 2022-07-22 RX ADMIN — PACLITAXEL 240 MG: 100 INJECTION, POWDER, LYOPHILIZED, FOR SUSPENSION INTRAVENOUS at 02:07

## 2022-07-22 RX ADMIN — HEPARIN 500 UNITS: 100 SYRINGE at 02:07

## 2022-07-22 RX ADMIN — Medication 10 ML: at 02:07

## 2022-07-22 RX ADMIN — SODIUM CHLORIDE: 0.9 INJECTION, SOLUTION INTRAVENOUS at 12:07

## 2022-07-22 NOTE — PLAN OF CARE
Pt received Abraxane & 1L NS today and tolerated well, without complications. Educated patient about Abraxane & 1L NS (indications, side effects, possible reactions, chemotherapy precautions) and verbalized understanding.  VSS. CW port positive for blood return, saline flushed, Heparin flush instilled to dwell and de accessed prior to DC. Pt DC with no distress noted, ambulated off unit w/o event, via self, pleased.

## 2022-07-25 ENCOUNTER — TELEPHONE (OUTPATIENT)
Dept: HEMATOLOGY/ONCOLOGY | Facility: CLINIC | Age: 47
End: 2022-07-25
Payer: MEDICARE

## 2022-07-28 ENCOUNTER — LAB VISIT (OUTPATIENT)
Dept: LAB | Facility: HOSPITAL | Age: 47
End: 2022-07-28
Attending: INTERNAL MEDICINE
Payer: MEDICARE

## 2022-07-28 DIAGNOSIS — Z17.1 MALIGNANT NEOPLASM INVOLVING BOTH NIPPLE AND AREOLA OF RIGHT BREAST IN MALE, ESTROGEN RECEPTOR NEGATIVE: Primary | ICD-10-CM

## 2022-07-28 DIAGNOSIS — C50.021 MALIGNANT NEOPLASM INVOLVING BOTH NIPPLE AND AREOLA OF RIGHT BREAST IN MALE, ESTROGEN RECEPTOR NEGATIVE: ICD-10-CM

## 2022-07-28 DIAGNOSIS — Z17.1 MALIGNANT NEOPLASM INVOLVING BOTH NIPPLE AND AREOLA OF RIGHT BREAST IN MALE, ESTROGEN RECEPTOR NEGATIVE: ICD-10-CM

## 2022-07-28 DIAGNOSIS — C50.021 MALIGNANT NEOPLASM INVOLVING BOTH NIPPLE AND AREOLA OF RIGHT BREAST IN MALE, ESTROGEN RECEPTOR NEGATIVE: Primary | ICD-10-CM

## 2022-07-28 LAB
ALBUMIN SERPL BCP-MCNC: 3.7 G/DL (ref 3.5–5.2)
ALP SERPL-CCNC: 73 U/L (ref 55–135)
ALT SERPL W/O P-5'-P-CCNC: 19 U/L (ref 10–44)
ANION GAP SERPL CALC-SCNC: 6 MMOL/L (ref 8–16)
AST SERPL-CCNC: 19 U/L (ref 10–40)
BILIRUB SERPL-MCNC: 0.8 MG/DL (ref 0.1–1)
BUN SERPL-MCNC: 17 MG/DL (ref 6–20)
CALCIUM SERPL-MCNC: 8.5 MG/DL (ref 8.7–10.5)
CHLORIDE SERPL-SCNC: 107 MMOL/L (ref 95–110)
CO2 SERPL-SCNC: 24 MMOL/L (ref 23–29)
CREAT SERPL-MCNC: 1.2 MG/DL (ref 0.5–1.4)
ERYTHROCYTE [DISTWIDTH] IN BLOOD BY AUTOMATED COUNT: 17.1 % (ref 11.5–14.5)
EST. GFR  (AFRICAN AMERICAN): >60 ML/MIN/1.73 M^2
EST. GFR  (NON AFRICAN AMERICAN): >60 ML/MIN/1.73 M^2
GLUCOSE SERPL-MCNC: 107 MG/DL (ref 70–110)
HCT VFR BLD AUTO: 32.6 % (ref 40–54)
HGB BLD-MCNC: 10.4 G/DL (ref 14–18)
IMM GRANULOCYTES # BLD AUTO: 0.03 K/UL (ref 0–0.04)
MCH RBC QN AUTO: 25.8 PG (ref 27–31)
MCHC RBC AUTO-ENTMCNC: 31.9 G/DL (ref 32–36)
MCV RBC AUTO: 81 FL (ref 82–98)
NEUTROPHILS # BLD AUTO: 1.7 K/UL (ref 1.8–7.7)
PLATELET # BLD AUTO: 187 K/UL (ref 150–450)
PMV BLD AUTO: 10.8 FL (ref 9.2–12.9)
POTASSIUM SERPL-SCNC: 4.3 MMOL/L (ref 3.5–5.1)
PROT SERPL-MCNC: 7 G/DL (ref 6–8.4)
RBC # BLD AUTO: 4.03 M/UL (ref 4.6–6.2)
SODIUM SERPL-SCNC: 137 MMOL/L (ref 136–145)
WBC # BLD AUTO: 3.14 K/UL (ref 3.9–12.7)

## 2022-07-28 PROCEDURE — 85027 COMPLETE CBC AUTOMATED: CPT | Performed by: NURSE PRACTITIONER

## 2022-07-28 PROCEDURE — 80053 COMPREHEN METABOLIC PANEL: CPT | Performed by: NURSE PRACTITIONER

## 2022-07-28 PROCEDURE — 36415 COLL VENOUS BLD VENIPUNCTURE: CPT | Performed by: NURSE PRACTITIONER

## 2022-07-29 DIAGNOSIS — E11.65 UNCONTROLLED TYPE 2 DIABETES MELLITUS WITH HYPERGLYCEMIA: ICD-10-CM

## 2022-07-29 RX ORDER — METFORMIN HYDROCHLORIDE 500 MG/1
1000 TABLET ORAL 2 TIMES DAILY WITH MEALS
Qty: 120 TABLET | Refills: 0 | OUTPATIENT
Start: 2022-07-29 | End: 2022-08-28

## 2022-07-30 ENCOUNTER — INFUSION (OUTPATIENT)
Dept: INFUSION THERAPY | Facility: HOSPITAL | Age: 47
End: 2022-07-30
Attending: INTERNAL MEDICINE
Payer: MEDICARE

## 2022-07-30 VITALS
SYSTOLIC BLOOD PRESSURE: 134 MMHG | HEART RATE: 74 BPM | WEIGHT: 315 LBS | TEMPERATURE: 99 F | RESPIRATION RATE: 18 BRPM | DIASTOLIC BLOOD PRESSURE: 67 MMHG | BODY MASS INDEX: 40.43 KG/M2 | HEIGHT: 74 IN

## 2022-07-30 DIAGNOSIS — C79.51 BONE METASTASES: ICD-10-CM

## 2022-07-30 DIAGNOSIS — F41.1 ANXIETY ASSOCIATED WITH CANCER DIAGNOSIS: ICD-10-CM

## 2022-07-30 DIAGNOSIS — C80.1 ANXIETY ASSOCIATED WITH CANCER DIAGNOSIS: ICD-10-CM

## 2022-07-30 DIAGNOSIS — C50.021 MALIGNANT NEOPLASM INVOLVING BOTH NIPPLE AND AREOLA OF RIGHT BREAST IN MALE, ESTROGEN RECEPTOR NEGATIVE: Primary | ICD-10-CM

## 2022-07-30 DIAGNOSIS — Z17.1 MALIGNANT NEOPLASM INVOLVING BOTH NIPPLE AND AREOLA OF RIGHT BREAST IN MALE, ESTROGEN RECEPTOR NEGATIVE: Primary | ICD-10-CM

## 2022-07-30 PROCEDURE — 63600175 PHARM REV CODE 636 W HCPCS: Performed by: INTERNAL MEDICINE

## 2022-07-30 PROCEDURE — 96361 HYDRATE IV INFUSION ADD-ON: CPT

## 2022-07-30 PROCEDURE — 96375 TX/PRO/DX INJ NEW DRUG ADDON: CPT

## 2022-07-30 PROCEDURE — 25000003 PHARM REV CODE 250: Performed by: NURSE PRACTITIONER

## 2022-07-30 PROCEDURE — 96413 CHEMO IV INFUSION 1 HR: CPT

## 2022-07-30 PROCEDURE — 63600175 PHARM REV CODE 636 W HCPCS: Mod: JG | Performed by: NURSE PRACTITIONER

## 2022-07-30 PROCEDURE — A4216 STERILE WATER/SALINE, 10 ML: HCPCS | Performed by: NURSE PRACTITIONER

## 2022-07-30 RX ORDER — SODIUM CHLORIDE 0.9 % (FLUSH) 0.9 %
10 SYRINGE (ML) INJECTION
Status: CANCELLED | OUTPATIENT
Start: 2022-08-20

## 2022-07-30 RX ORDER — ZOLEDRONIC ACID 0.04 MG/ML
4 INJECTION, SOLUTION INTRAVENOUS
Status: COMPLETED | OUTPATIENT
Start: 2022-07-30 | End: 2022-07-30

## 2022-07-30 RX ORDER — SODIUM CHLORIDE 9 MG/ML
INJECTION, SOLUTION INTRAVENOUS CONTINUOUS
Status: DISCONTINUED | OUTPATIENT
Start: 2022-07-30 | End: 2022-07-30 | Stop reason: HOSPADM

## 2022-07-30 RX ORDER — HEPARIN 100 UNIT/ML
500 SYRINGE INTRAVENOUS
Status: DISCONTINUED | OUTPATIENT
Start: 2022-07-30 | End: 2022-07-30 | Stop reason: HOSPADM

## 2022-07-30 RX ORDER — HEPARIN 100 UNIT/ML
500 SYRINGE INTRAVENOUS
Status: CANCELLED | OUTPATIENT
Start: 2022-08-20

## 2022-07-30 RX ORDER — ZOLEDRONIC ACID 0.04 MG/ML
4 INJECTION, SOLUTION INTRAVENOUS
Status: CANCELLED | OUTPATIENT
Start: 2022-08-20

## 2022-07-30 RX ORDER — SODIUM CHLORIDE 0.9 % (FLUSH) 0.9 %
10 SYRINGE (ML) INJECTION
Status: DISCONTINUED | OUTPATIENT
Start: 2022-07-30 | End: 2022-07-30 | Stop reason: HOSPADM

## 2022-07-30 RX ADMIN — SODIUM CHLORIDE: 0.9 INJECTION, SOLUTION INTRAVENOUS at 08:07

## 2022-07-30 RX ADMIN — SODIUM CHLORIDE: 0.9 INJECTION, SOLUTION INTRAVENOUS at 09:07

## 2022-07-30 RX ADMIN — HEPARIN SODIUM (PORCINE) LOCK FLUSH IV SOLN 100 UNIT/ML 500 UNITS: 100 SOLUTION at 10:07

## 2022-07-30 RX ADMIN — Medication 10 ML: at 10:07

## 2022-07-30 RX ADMIN — PACLITAXEL 240 MG: 100 INJECTION, POWDER, LYOPHILIZED, FOR SUSPENSION INTRAVENOUS at 09:07

## 2022-07-30 RX ADMIN — ZOLEDRONIC ACID 4 MG: 0.04 INJECTION, SOLUTION INTRAVENOUS at 09:07

## 2022-07-30 NOTE — PLAN OF CARE
Pt tolerated Abraxane/Zometa and 1L IVFs today. NAD. Port flushed + blood return present, flushed. Hep lock and deaccesed. declined AVS. Uses my [facility]. Discharged home. Ambulated independently.   Problem: Adult Inpatient Plan of Care  Goal: Optimal Comfort and Wellbeing  Intervention: Provide Person-Centered Care  Flowsheets (Taken 7/30/2022 9806)  Trust Relationship/Rapport:   care explained   questions answered   questions encouraged   choices provided   reassurance provided   emotional support provided   empathic listening provided   thoughts/feelings acknowledged

## 2022-07-31 DIAGNOSIS — E11.65 UNCONTROLLED TYPE 2 DIABETES MELLITUS WITH HYPERGLYCEMIA: ICD-10-CM

## 2022-07-31 RX ORDER — METFORMIN HYDROCHLORIDE 500 MG/1
1000 TABLET ORAL 2 TIMES DAILY WITH MEALS
Qty: 120 TABLET | Refills: 0 | OUTPATIENT
Start: 2022-07-31 | End: 2022-08-30

## 2022-08-01 DIAGNOSIS — E11.65 UNCONTROLLED TYPE 2 DIABETES MELLITUS WITH HYPERGLYCEMIA: ICD-10-CM

## 2022-08-01 RX ORDER — ALPRAZOLAM 0.5 MG/1
0.5 TABLET ORAL 3 TIMES DAILY PRN
Qty: 60 TABLET | Refills: 0 | Status: SHIPPED | OUTPATIENT
Start: 2022-08-01 | End: 2022-09-12 | Stop reason: SDUPTHER

## 2022-08-01 RX ORDER — METFORMIN HYDROCHLORIDE 500 MG/1
1000 TABLET ORAL 2 TIMES DAILY WITH MEALS
Qty: 120 TABLET | Refills: 0 | OUTPATIENT
Start: 2022-08-01 | End: 2022-08-31

## 2022-08-03 ENCOUNTER — TELEPHONE (OUTPATIENT)
Dept: PALLIATIVE MEDICINE | Facility: CLINIC | Age: 47
End: 2022-08-03
Payer: MEDICARE

## 2022-08-08 ENCOUNTER — PATIENT MESSAGE (OUTPATIENT)
Dept: HEMATOLOGY/ONCOLOGY | Facility: CLINIC | Age: 47
End: 2022-08-08
Payer: MEDICARE

## 2022-08-08 ENCOUNTER — SPECIALTY PHARMACY (OUTPATIENT)
Dept: PHARMACY | Facility: CLINIC | Age: 47
End: 2022-08-08
Payer: MEDICARE

## 2022-08-08 DIAGNOSIS — C50.021 MALIGNANT NEOPLASM INVOLVING BOTH NIPPLE AND AREOLA OF RIGHT BREAST IN MALE, ESTROGEN RECEPTOR NEGATIVE: ICD-10-CM

## 2022-08-08 DIAGNOSIS — G89.3 NEOPLASM RELATED PAIN: ICD-10-CM

## 2022-08-08 DIAGNOSIS — C79.51 BONE METASTASES: ICD-10-CM

## 2022-08-08 DIAGNOSIS — Z17.1 MALIGNANT NEOPLASM INVOLVING BOTH NIPPLE AND AREOLA OF RIGHT BREAST IN MALE, ESTROGEN RECEPTOR NEGATIVE: ICD-10-CM

## 2022-08-08 RX ORDER — ALPELISIB 150 MG/1
300 TABLET ORAL DAILY
Qty: 60 TABLET | Refills: 3 | Status: CANCELLED | OUTPATIENT
Start: 2022-08-08

## 2022-08-08 RX ORDER — HYDROCODONE BITARTRATE AND ACETAMINOPHEN 10; 325 MG/1; MG/1
1 TABLET ORAL EVERY 6 HOURS PRN
Qty: 90 TABLET | Refills: 0 | Status: SHIPPED | OUTPATIENT
Start: 2022-08-08 | End: 2022-09-11 | Stop reason: SDUPTHER

## 2022-08-09 ENCOUNTER — PATIENT MESSAGE (OUTPATIENT)
Dept: HEMATOLOGY/ONCOLOGY | Facility: CLINIC | Age: 47
End: 2022-08-09
Payer: MEDICARE

## 2022-08-10 ENCOUNTER — PATIENT MESSAGE (OUTPATIENT)
Dept: HEMATOLOGY/ONCOLOGY | Facility: CLINIC | Age: 47
End: 2022-08-10
Payer: MEDICARE

## 2022-08-10 NOTE — TELEPHONE ENCOUNTER
Specialty Pharmacy - Refill Coordination    Specialty Medication Orders Linked to Encounter    Flowsheet Row Most Recent Value   Medication #1 alpelisib 300 mg/day (150 mg x 2) Tab (Order#[id number], Rx#[id number])          Refill Questions - Documented Responses    Flowsheet Row Most Recent Value   Patient Availability and HIPAA Verification    Does patient want to proceed with activity? Yes   HIPAA/medical authority confirmed? Yes   Relationship to patient of person spoken to? Self   Refill Screening Questions    Changes to allergies? No   Changes to medications? No   New conditions since last clinic visit? No   Unplanned office visit, urgent care, ED, or hospital admission in the last 4 weeks? No   How does patient/caregiver feel medication is working? Good   Financial problems or insurance changes? No   How many doses of your specialty medications were missed in the last 4 weeks? 0   Would patient like to speak to a pharmacist? No   When does the patient need to receive the medication? 08/14/22   Refill Delivery Questions    How will the patient receive the medication? Delivery [facility]   When does the patient need to receive the medication? 08/14/22   Shipping Address Home   Address in [facility] confirmed and updated if neccessary? Yes   Expected Copay ($) 0   Is the patient able to afford the medication copay? Yes   Payment Method zero copay   Days supply of Refill 30   Supplies needed? No supplies needed   Refill activity completed? Yes   Refill activity plan Refill scheduled   Shipment/Pickup Date: 08/11/22          Current Outpatient Medications   Medication Sig    alpelisib 300 mg/day (150 mg x 2) Tab Take 2 tablets (300 mg) by mouth once daily.    ALPRAZolam (XANAX) 0.5 MG tablet Take 1 tablet (0.5 mg total) by mouth 3 (three) times daily as needed for Insomnia or Anxiety.    amLODIPine (NORVASC) 10 MG tablet TAKE 1 TABLET (10 MG) BY MOUTH EVERY DAY    calcium-vitamin D3 (OYSTER SHELL CALCIUM-VIT  "D3) 500 mg-5 mcg (200 unit) per tablet Take 1 tablet by mouth 2 (two) times daily.    diphenoxylate-atropine 2.5-0.025 mg (LOMOTIL) 2.5-0.025 mg per tablet Take 1 tablet by mouth 4 (four) times daily as needed for Diarrhea.    dulaglutide (TRULICITY) 1.5 mg/0.5 mL PnIj Inject 1.5 mg into the skin once a week. Trulicity 0.75mg weekly for 4 weeks & then increase to 1.5 mg weekly indefinitely. (Patient taking differently: Inject 1.5 mg into the skin once a week. Trulicity 0.75mg weekly for 4 weeks & then increase to 1.5 mg weekly indefinitely.(PATIENT TAKES ON SUNDAYS))    FLUoxetine 20 MG capsule Take 2 capsules (40 mg total) by mouth once daily.    gabapentin (NEURONTIN) 300 MG capsule Take 1 capsule (300 mg total) by mouth 3 (three) times daily.    hydroCHLOROthiazide (HYDRODIURIL) 25 MG tablet TAKE 1 TABLET BY MOUTH ONCE DAILY    HYDROcodone-acetaminophen (NORCO)  mg per tablet Take 1 tablet by mouth every 6 (six) hours as needed for Pain.    insulin detemir U-100 (LEVEMIR FLEXTOUCH U-100 INSULN) 100 unit/mL (3 mL) InPn pen Inject 21 Units into the skin every evening.    lancets 33 gauge Misc 1 lancet by Misc.(Non-Drug; Combo Route) route once daily.    lancing device Misc 1 Device by Misc.(Non-Drug; Combo Route) route 2 (two) times daily with meals.    LIDOcaine-prilocaine (EMLA) cream Apply topically as needed.    losartan (COZAAR) 50 MG tablet Take 2 tablets by mouth once daily    metFORMIN (GLUCOPHAGE) 500 MG tablet Take 2 tablets (1,000 mg total) by mouth 2 (two) times daily with meals. Needs appointment for future refills    ondansetron (ZOFRAN-ODT) 8 MG TbDL Take 1 tablet (8 mg total) by mouth every 8 (eight) hours as needed (nausea).    pen needle, diabetic (BD ULTRA-FINE TANESHA PEN NEEDLE) 32 gauge x 5/32" Ndle Use as directed with novolog and levemir    tadalafiL (CIALIS) 5 MG tablet Take 2 tablets (10 mg total) by mouth daily as needed for Erectile Dysfunction.   Last reviewed on " 7/22/2022 12:01 PM by Johnson Hunter, RN    Review of patient's allergies indicates:  No Known Allergies Last reviewed on  8/8/2022 1:35 PM by Amber Mims      Tasks added this encounter   No tasks added.   Tasks due within next 3 months   10/12/2022 - Clinical - Follow Up Assesement (180 day)  8/8/2022 - Refill Call (Auto Added)     Mayank Nunez, PharmD  [facility]  [address] [place] [id number]  Phone: [phone]  Fax: [phone]

## 2022-08-11 ENCOUNTER — PATIENT OUTREACH (OUTPATIENT)
Dept: ADMINISTRATIVE | Facility: HOSPITAL | Age: 47
End: 2022-08-11
Payer: MEDICARE

## 2022-08-11 ENCOUNTER — PATIENT MESSAGE (OUTPATIENT)
Dept: ADMINISTRATIVE | Facility: HOSPITAL | Age: 47
End: 2022-08-11
Payer: MEDICARE

## 2022-08-11 ENCOUNTER — PES CALL (OUTPATIENT)
Dept: ADMINISTRATIVE | Facility: CLINIC | Age: 47
End: 2022-08-11
Payer: MEDICARE

## 2022-08-11 DIAGNOSIS — U07.1 COVID: Primary | ICD-10-CM

## 2022-08-11 NOTE — PROGRESS NOTES
Subjective:       Patient ID: Paul Li Jr. is a 46 y.o. male.    Chief Complaint: Malignant neoplasm involving both nipple and areola of righ    HPI Overall he is doing well.     Returns for follow up for chemotherapy C13D1 (Abraxane and PO Aplelisib)  Otherwise feels good physically. Appetite and bowel movements good.     No fevers, chills or infectious complaints  No pain issues.   Weight stable  Lymphedema stable and wearing sleeve    Starts part time job at [employer] next week.    Per Dr. Linn's previous note: Most recent imaging-   - 6/6/2022 PET scan:  FINDINGS:  Quality of the study: Adequate.  In the head and neck, there is nonspecific focal uptake in the right infratemporal fossa without CT correlate which may reflect atypically focal muscular uptake.  There are no other hypermetabolic lesions worrisome for malignancy. There are no hypermetabolic mucosal lesions, and there are no pathologically enlarged or hypermetabolic lymph nodes.  In the chest, there are no hypermetabolic lesions worrisome for malignancy.  No pathologically enlarged or hypermetabolic lymph nodes.  Stable subcentimeter right pulmonary nodules too small to characterize on PET (images 84 and 85). Postsurgical changes of right mastectomy and axillary node dissection.  In the abdomen and pelvis, there is physiologic tracer distribution within the abdominal organs and excretion into the genitourinary system.  Diffuse sigmoid mild uptake, likely related to metformin use or physiologic activity.  There is sigmoid diverticulosis without CT evidence of diverticulitis.  In the bones, there are hypermetabolic lesions at the T12 and T7 vertebral bodies.  Uptake at the T12 vertebral body measures 5.8 SUV max corresponding to a region of prior sclerosis and T7 with an SUV of 4.4 within the body away from focal sclerosis on the right.  Other sclerotic lesions demonstrate uptake similar to normal marrow activity.  In the extremities, there are no  hypermetabolic lesions worrisome for malignancy.  Additional CT findings: Bilateral inguinal hernias.  Large left hydrocele.  Impression:  Interval increased uptake in the T7 body away from sclerosis and T12 body involving a region of prior sclerosis.  Differential considerations for T7 include focal nodular hyperplasia of marrow versus early, CT occult metastasis, and differential considerations for T12 include active osteoblastic metastasis versus healing metastasis.  Other sclerotic lesions demonstrate uptake similar to normal marrow suggesting response to therapy.  Nonspecific focal uptake at the right temporal fossa without CT correlate may indicate atypically focal muscular uptake.  Attention on follow-up     Diagnosis:  Metastatic TNBC progressed (prior Stage IB ([id number]) triple negative invasive ductal carcinoma with lobular features of right breast, retroareolar, Grade 2, Ki67 80%, diagnosed 2019)     Oncology History:  Metastatic  Set up for scans (due to back pain) which are consistent for recurrence.   Imagin/3/2021 MRI T/L spine:  FINDINGS:  Alignment: Within normal limits.  Vertebrae: Low T1 signal intensity in the left T12 vertebral body extending to the left pedicle, S1 and S2 vertebral bodies with abnormal enhancement.  The vertebral heights are well maintained.  No evidence of acute fractures.  Abnormal appearance of the LEFT sacrum and ilium as well.  Discs: Degenerative disease of the level of L4-L5 with posterior annular fissure.  Conus appears within normal limits terminating at the level of the L2. level.  Cauda equina appears unremarkable.  Mild circumferential disc bulging at the level of T10-T11 abutting the ventral thecal sac.  No significant spinal canal stenosis or neural foraminal narrowing throughout the thoracic spine.  No significant spinal canal stenosis or neural foraminal narrowing throughout the lumbar spine.  Paraspinous muscles and soft tissues: Subcentimeter focal  enhancement in the posterior column along the supraspinous ligament at the level of L1-L2 (sagittal series 21, image 10) potentially inflammatory versus neoplastic.  Impression:  Abnormal appearance of the T12, S1, S2 vertebral bodies as well as LEFT sacrum and ilium concerning for metastatic disease in this patient with history of breast cancer.  No evidence for significant central canal narrowing.  Additional findings, as above.  This report was flagged in Epic as abnormal.     6/9/2021 PET scan:  COMPARISON:  MRI thoracic and lumbar spine 06/03/2021  FINDINGS:  Quality of the study: Adequate.  In the head and neck, there are no hypermetabolic lesions worrisome for malignancy. There are no hypermetabolic mucosal lesions, and there are no pathologically enlarged or hypermetabolic lymph nodes.  In the chest, there is postoperative change of right mastectomy/right axillary lymph node dissection.  There is low level hypermetabolic activity with mild soft tissue thickening in the skin/superficial subcutaneous fat of the right chest wall (axial fused image 78) with SUV max 3.6.  There is soft tissue thickening measuring approximately 1.8 x 7.9 cm in the subcutaneous fat of the right chest wall (axial series 3, image 84) with SUV max 3.1.  There are a few bilateral pulmonary nodules measuring up to 0.3 cm in the left lung (axial series 3, image 88) and 0.5 cm in the right lung (axial series 3, image 84).  These nodules are too small to characterize by PET.  There are no pathologically enlarged or hypermetabolic lymph nodes.  In the abdomen and pelvis, there is physiologic tracer distribution within the abdominal organs and excretion into the genitourinary system.  Subtle sen mesentery and multiple prominent mesenteric lymph nodes measuring up to 1.8 cm in long axis with background activity.  In the bones, there are several hypermetabolic lesions worrisome for malignancy.  Sclerotic lesion in the left T12 vertebral body  (axial series 3, image 131) with SUV max 12.  Sclerotic lesions in the sacrum (for example axial series 3, image 188) with SUV max 9.7.  Sclerotic lesions in the left iliac bone (for example axial series 3, image 205) with SUV max 6.  In the extremities, there are no hypermetabolic lesions worrisome for malignancy.  Incidental findings:  Bilateral maxillary antra mucous retention cysts.  Fat containing right inguinal hernia.  Impression:  1. In this patient with right breast carcinoma status post mastectomy/right axillary lymph node dissection, there are multiple sclerotic lesions in the T12 vertebral body, sacrum, and left iliac bone with hypermetabolic activity concerning for active metastatic disease and in keeping with findings on MRI 06/03/2021.  2. Additionally there is low-level hypermetabolic activity with soft tissue thickening in the right chest wall as detailed above.  These findings are nonspecific and may be related to postoperative/post radiation change, with recurrent malignancy not excluded.  3. Nonspecific mesenteric haziness and lymph nodes which may indicate mesenteric adenitis.  Recommend clinical correlation and attention on follow-up.  4. Additional findings as above.  I, Sohan Tillman MD, attest that I reviewed and interpreted the images.     In summary,   Started aplelisib 8/22/2021   Abraxane C1 started 8/13/2021  We had sent Guardant and discussed results with Molecular tumor board  He also had a repeat bx to confirm metastatic triple negative breast cancer  Plan:   Nab-Paclitaxel and Alpelisib  Reference: https://ascopubs.org/doi/abs/10.1200/JCO.[email or web address]_suppl.1018  Also on Zometa-      - C1D1 Abraxane 8/13/2021  - Started aplelisib 8/22/2021               Oncology History   Malignant neoplasm involving both nipple and areola of right breast in male, estrogen receptor negative   5/1/2019 Imaging Significant Findings     Mammogram and US  Impression:  Right  Mass: Right breast 14 mm mass at  the retroareolar anterior position. Assessment: 4 - Suspicious finding. Biopsy is recommended.   Left  There is no mammographic or sonographic evidence of malignancy.  Recommendation:  Biopsy is recommended.      5/2/2019 Biopsy     BREAST, RIGHT, RETROAREOLAR MASS, ULTRASOUND-GUIDED BIOPSY:  Invasive ductal carcinoma with lobular features.  Size of invasive carcinoma: 5 MM in greatest linear dimension within a single      5/2/2019 Breast Tumor Markers     Estrogen: Negative  Progesterone: Negative  HER2: Negative  Ki67: 80      5/17/2019 Cancer Staged     Staging form: Breast, AJCC 8th Edition  - Clinical stage from 5/17/2019: Stage IB (cT1c, cN0, cM0, G2, ER-, KY-, HER2-) - Signed by Richa Bahena MD on 5/17/2019 5/27/2019 - 7/21/2019 Chemotherapy     Treatment Summary   Plan Name: OP BREAST DOSE-DENSE AC - DOXORUBICIN CYCLOPHOSPHAMIDE Q2W  Treatment Goal: Curative  Status: Inactive  Start Date: 5/27/2019  End Date: 7/9/2019  Provider: Richa Bahena MD  Chemotherapy: DOXOrubicin chemo injection 186 mg, 60 mg/m2 = 186 mg, Intravenous, Clinic/HOD 1 time, 4 of 4 cycles  Administration: 186 mg (5/27/2019), 186 mg (6/10/2019), 186 mg (6/24/2019), 186 mg (7/8/2019)  cyclophosphamide (CYTOXAN) 600 mg/m2 = 1,865 mg in sodium chloride 0.9% 250 mL chemo infusion, 600 mg/m2 = 1,865 mg, Intravenous, Clinic/HOD 1 time, 4 of 4 cycles  Administration: 1,865 mg (5/27/2019), 1,865 mg (6/10/2019), 1,865 mg (6/24/2019), 1,865 mg (7/8/2019)      7/22/2019 - 10/15/2019 Chemotherapy     Treatment Summary   Plan Name: OP PACLITAXEL QW  Treatment Goal: Curative  Status: Inactive  Start Date: 7/24/2019  End Date: 10/8/2019  Provider: Richa Bahena MD  Chemotherapy: PACLitaxel (TAXOL) 80 mg/m2 = 246 mg in sodium chloride 0.9% 250 mL chemo infusion, 80 mg/m2 = 246 mg, Intravenous, Clinic/HOD 1 time, 12 of 12 cycles  Dose modification: 60 mg/m2 (original dose 80 mg/m2, Cycle 3), 55 mg/m2 (original dose 80 mg/m2, Cycle 7), 60  mg/m2 (original dose 80 mg/m2, Cycle 7), 50 mg/m2 (original dose 80 mg/m2, Cycle 9), 50 mg/m2 (original dose 80 mg/m2, Cycle 10)  Administration: 246 mg (7/24/2019), 246 mg (7/31/2019), 186 mg (8/7/2019), 186 mg (8/14/2019), 186 mg (8/21/2019), 186 mg (8/28/2019), 186 mg (9/4/2019), 174 mg (9/11/2019), 156 mg (9/18/2019), 156 mg (9/25/2019), 156 mg (10/1/2019), 156 mg (10/8/2019)      11/22/2019 Breast Surgery     On 11/22/19 Dr whyte performed a right breast mastectomy with SLN biopsy with pathology showing grade 2, 6 mm IDC with extensive treatment effect, no LVI with 1 LN positive 4 mm, negative margins. The on 12/17/19, Dr Whyte performed right axillary dissection with pathology still pending.      11/22/2019 Cancer Staged     ypT1b N1      12/17/2019 Breast Surgery     Surgery: Dr Shima Whyte, [phone] performed right ALND with pathology showing 14 negative lymph nodes.             1/14/2020 Tumor Conference      Post mastectomy radiation therapy: Xeloda, then possible Keytruda trial .      2/3/2020 - 3/23/2020 Radiation Therapy     Treating physician: Speedy Nair MD   Total Dose: 50.4 Gy to the chest wall and regional lymphatics  10 Gy boost to the prostate.   Fractions: 28 fractions at 1.8 Gy per fraction  5 fractions at 2 Gy per fraction       2/28/2020 -  Chemotherapy     * 4/15/2020 - 9/28/20 completed 6 cycles adjuvant Xeloda 1000 mg/m2 bid days 1-14 every 21 days  Treatment Summary   Plan Name: OP CAPECITABINE 1250MG/M2 BID Q3W  Treatment Goal: Curative  Status: Active  Start Date: 3/20/2020  End Date: 3/20/2020  Provider: Richa Bahena MD  Chemotherapy: [No matching medication found in this treatment plan]        Review of Systems   Constitutional: Negative for activity change, appetite change, chills, fatigue, fever and unexpected weight change.   HENT: Negative for dental problem, postnasal drip, rhinorrhea, sinus pressure/congestion, sore throat, trouble swallowing and voice change.     Eyes: Negative for visual disturbance.   Respiratory: Negative for cough, shortness of breath and wheezing.    Cardiovascular: Positive for leg swelling (chronic, minimal today). Negative for chest pain and palpitations.   Gastrointestinal: Negative for abdominal distention, abdominal pain, blood in stool, change in bowel habit, constipation, diarrhea, nausea, vomiting and change in bowel habit.   Endocrine: Negative for cold intolerance and heat intolerance.   Genitourinary: Negative for decreased urine volume, difficulty urinating, dysuria, frequency, hematuria and urgency.   Musculoskeletal: Negative for arthralgias, back pain, gait problem, joint swelling, myalgias, neck pain and neck stiffness.   Integumentary:  Negative for color change, pallor, rash and wound.        Right arm lymphedema    Neurological: Positive for numbness (improved neuropathy; right hand fingers). Negative for dizziness, weakness, light-headedness and headaches.   Hematological: Negative for adenopathy. Does not bruise/bleed easily.   Psychiatric/Behavioral: Negative for dysphoric mood and sleep disturbance. The patient is not nervous/anxious.          Objective:      Physical Exam  Vitals and nursing note reviewed.   Constitutional:       General: He is not in acute distress.     Appearance: Normal appearance. He is well-developed. He is obese. He is not diaphoretic.      Comments: Presents alone  Very pleasant  ECOG= 0   HENT:      Head: Normocephalic and atraumatic.   Eyes:      General: No scleral icterus.     Extraocular Movements: Extraocular movements intact.      Conjunctiva/sclera: Conjunctivae normal.      Pupils: Pupils are equal, round, and reactive to light.   Neck:      Thyroid: No thyromegaly.      Trachea: No tracheal deviation.   Cardiovascular:      Rate and Rhythm: Normal rate and regular rhythm.      Heart sounds: Normal heart sounds. No murmur heard.    No friction rub. No gallop.   Pulmonary:      Effort:  Pulmonary effort is normal. No respiratory distress.      Breath sounds: Normal breath sounds. No wheezing, rhonchi or rales.   Chest:      Chest wall: No tenderness.   Abdominal:      General: Bowel sounds are normal. There is no distension.      Palpations: Abdomen is soft. There is no mass.      Tenderness: There is no abdominal tenderness. There is no guarding or rebound.      Comments: No organomegaly   Musculoskeletal:         General: Swelling (chronic RUE lymphedema) present. No tenderness or deformity. Normal range of motion.      Cervical back: Normal range of motion and neck supple. No rigidity or tenderness.      Right lower leg: Edema (trace, chronic) present.      Left lower leg: Edema (trace, chronic) present.   Lymphadenopathy:      Cervical: No cervical adenopathy.   Skin:     General: Skin is warm and dry.      Coloration: Skin is not jaundiced or pale.      Findings: No erythema or rash.   Neurological:      General: No focal deficit present.      Mental Status: He is alert and oriented to person, place, and time. Mental status is at baseline.      Cranial Nerves: No cranial nerve deficit.      Sensory: No sensory deficit.      Motor: No weakness or abnormal muscle tone.      Coordination: Coordination normal.      Gait: Gait normal.      Deep Tendon Reflexes: Reflexes are normal and symmetric. Reflexes normal.   Psychiatric:         Mood and Affect: Mood normal.         Behavior: Behavior normal.         Thought Content: Thought content normal.         Judgment: Judgment normal.       Labs- reviewed  Assessment:       1. Malignant neoplasm involving both nipple and areola of right breast in male, estrogen receptor negative     2. Bone metastases     3. Chemotherapy-induced neutropenia     4. Anemia associated with chemotherapy     5. Essential hypertension     6. Type 2 diabetes mellitus without complication, unspecified whether long term insulin use     7. Morbid obesity with BMI of 50.0-59.9,  adult            Plan:       1-4. Doing well on current regimen  Labs adequate  Knows to call with any issues  Encouraged to increase fluid intake    5. Monitored today; continue current medication and follow up with PCP     6. Monitored glucose today; continue current medications and follow up with endocrinology   - Metformin refilled today  - Referred to internal medicine at [facility] to establish care    7. Diet    Return to clinic in 4 weeks with MD appointment and labs.     Patient is in agreement with the proposed treatment plan. All questions were answered to the patient's satisfaction. Patient knows to call clinic for any new or worsening symptoms and if anything is needed before the next clinic visit.          Michelle Grayson, FNP-C  Hematology & Medical Oncology   [address] [place] [address]  ph. [phone]  Fax. [phone]    Collaborating physician, Dr. Linn.    Approximately 15 minutes were spent face-to-face with the patient.  Approximately 25 minutes in total were spent on this encounter, which includes face-to-face time and non-face-to-face time preparing to see the patient (e.g., review of tests), obtaining and/or reviewing separately obtained history, documenting clinical information in the electronic or other health record, independently interpreting results (not separately reported) and communicating results to the patient/family/caregiver, or care coordination (not separately reported).         Route Chart for Scheduling    Med Onc Chart Routing      Follow up with physician 4 weeks.   Follow up with JAC    Infusion scheduling note    Injection scheduling note Needs labs 8/26 and wishes for chemo to moved from 8/27 to 8/26; zometa due 8/26 also needs labs and chemo 9/2, needs appointment with MD on 9/15 with labs and chemo    Labs CBC and CMP   Lab interval:     Imaging    Pharmacy appointment    Other referrals          Treatment Plan Information   OP BREAST  NAB-PACLITAXEL D1, D8, D15 + ALPELISIB    Rosa Linn MD   Upcoming Treatment Dates - OP BREAST NAB-PACLITAXEL D1, D8, D15 + ALPELISIB     8/12/2022       Chemotherapy       PACLitaxeL protein-bound (ABRAXANE) 80 mg/m2 = 240 mg in 48 mL infusion  8/19/2022       Chemotherapy       PACLitaxeL protein-bound (ABRAXANE) 80 mg/m2 = 240 mg in 48 mL infusion  8/26/2022       Chemotherapy       PACLitaxeL protein-bound (ABRAXANE) 80 mg/m2 = 240 mg in 48 mL infusion    Supportive Plan Information  OP FILGRASTIM 480 MCG   Michelle Grayson NP   Upcoming Treatment Dates - OP FILGRASTIM 480 MCG    No upcoming days in selected categories.    Therapy Plan Information  PORT FLUSH  Flushes  heparin, porcine (PF) 100 unit/mL injection flush 500 Units  500 Units, Intravenous, Every visit  sodium chloride 0.9% flush 10 mL  10 mL, Intravenous, Every visit    ZOLEDRONIC ACID (ZOMETA) IV  Medications  zoledronic 4 mg/100 mL infusion 4 mg  4 mg, Intravenous, Every 4 weeks  Flushes  sodium chloride 0.9% 250 mL flush bag  Intravenous, Every 4 weeks  sodium chloride 0.9% flush 10 mL  10 mL, Intravenous, Every 4 weeks  heparin, porcine (PF) 100 unit/mL injection flush 500 Units  500 Units, Intravenous, Every 4 weeks  alteplase injection 2 mg  2 mg, Intra-Catheter, Every 4 weeks

## 2022-08-17 DIAGNOSIS — E11.65 UNCONTROLLED TYPE 2 DIABETES MELLITUS WITH HYPERGLYCEMIA: ICD-10-CM

## 2022-08-18 DIAGNOSIS — E11.65 UNCONTROLLED TYPE 2 DIABETES MELLITUS WITH HYPERGLYCEMIA: ICD-10-CM

## 2022-08-18 RX ORDER — METFORMIN HYDROCHLORIDE 500 MG/1
1000 TABLET ORAL 2 TIMES DAILY WITH MEALS
Qty: 120 TABLET | Refills: 0 | OUTPATIENT
Start: 2022-08-18 | End: 2022-09-17

## 2022-08-19 ENCOUNTER — LAB VISIT (OUTPATIENT)
Dept: LAB | Facility: HOSPITAL | Age: 47
End: 2022-08-19
Attending: INTERNAL MEDICINE
Payer: MEDICARE

## 2022-08-19 ENCOUNTER — OFFICE VISIT (OUTPATIENT)
Dept: HEMATOLOGY/ONCOLOGY | Facility: CLINIC | Age: 47
End: 2022-08-19
Payer: MEDICARE

## 2022-08-19 VITALS
BODY MASS INDEX: 40.43 KG/M2 | HEIGHT: 74 IN | DIASTOLIC BLOOD PRESSURE: 70 MMHG | HEART RATE: 68 BPM | RESPIRATION RATE: 18 BRPM | SYSTOLIC BLOOD PRESSURE: 130 MMHG | TEMPERATURE: 98 F | WEIGHT: 315 LBS | OXYGEN SATURATION: 96 %

## 2022-08-19 DIAGNOSIS — Z17.1 MALIGNANT NEOPLASM INVOLVING BOTH NIPPLE AND AREOLA OF RIGHT BREAST IN MALE, ESTROGEN RECEPTOR NEGATIVE: ICD-10-CM

## 2022-08-19 DIAGNOSIS — E66.01 MORBID OBESITY WITH BMI OF 50.0-59.9, ADULT: ICD-10-CM

## 2022-08-19 DIAGNOSIS — D64.81 ANEMIA ASSOCIATED WITH CHEMOTHERAPY: ICD-10-CM

## 2022-08-19 DIAGNOSIS — E11.9 TYPE 2 DIABETES MELLITUS WITHOUT COMPLICATION, UNSPECIFIED WHETHER LONG TERM INSULIN USE: ICD-10-CM

## 2022-08-19 DIAGNOSIS — Z17.1 MALIGNANT NEOPLASM INVOLVING BOTH NIPPLE AND AREOLA OF RIGHT BREAST IN MALE, ESTROGEN RECEPTOR NEGATIVE: Primary | ICD-10-CM

## 2022-08-19 DIAGNOSIS — N43.3 HYDROCELE, UNSPECIFIED HYDROCELE TYPE: ICD-10-CM

## 2022-08-19 DIAGNOSIS — D70.1 CHEMOTHERAPY-INDUCED NEUTROPENIA: ICD-10-CM

## 2022-08-19 DIAGNOSIS — E11.65 UNCONTROLLED TYPE 2 DIABETES MELLITUS WITH HYPERGLYCEMIA: ICD-10-CM

## 2022-08-19 DIAGNOSIS — C50.021 MALIGNANT NEOPLASM INVOLVING BOTH NIPPLE AND AREOLA OF RIGHT BREAST IN MALE, ESTROGEN RECEPTOR NEGATIVE: Primary | ICD-10-CM

## 2022-08-19 DIAGNOSIS — I10 ESSENTIAL HYPERTENSION: ICD-10-CM

## 2022-08-19 DIAGNOSIS — C79.51 BONE METASTASES: ICD-10-CM

## 2022-08-19 DIAGNOSIS — T45.1X5A ANEMIA ASSOCIATED WITH CHEMOTHERAPY: ICD-10-CM

## 2022-08-19 DIAGNOSIS — C50.021 MALIGNANT NEOPLASM INVOLVING BOTH NIPPLE AND AREOLA OF RIGHT BREAST IN MALE, ESTROGEN RECEPTOR NEGATIVE: ICD-10-CM

## 2022-08-19 DIAGNOSIS — T45.1X5A CHEMOTHERAPY-INDUCED NEUTROPENIA: ICD-10-CM

## 2022-08-19 LAB
ALBUMIN SERPL BCP-MCNC: 4.1 G/DL (ref 3.5–5.2)
ALP SERPL-CCNC: 92 U/L (ref 55–135)
ALT SERPL W/O P-5'-P-CCNC: 24 U/L (ref 10–44)
ANION GAP SERPL CALC-SCNC: 8 MMOL/L (ref 8–16)
AST SERPL-CCNC: 19 U/L (ref 10–40)
BASOPHILS # BLD AUTO: 0.02 K/UL (ref 0–0.2)
BASOPHILS NFR BLD: 0.4 % (ref 0–1.9)
BILIRUB SERPL-MCNC: 0.8 MG/DL (ref 0.1–1)
BUN SERPL-MCNC: 18 MG/DL (ref 6–20)
CALCIUM SERPL-MCNC: 9.7 MG/DL (ref 8.7–10.5)
CHLORIDE SERPL-SCNC: 102 MMOL/L (ref 95–110)
CO2 SERPL-SCNC: 26 MMOL/L (ref 23–29)
CREAT SERPL-MCNC: 1.5 MG/DL (ref 0.5–1.4)
DIFFERENTIAL METHOD: ABNORMAL
EOSINOPHIL # BLD AUTO: 0 K/UL (ref 0–0.5)
EOSINOPHIL NFR BLD: 0.4 % (ref 0–8)
ERYTHROCYTE [DISTWIDTH] IN BLOOD BY AUTOMATED COUNT: 17.5 % (ref 11.5–14.5)
EST. GFR  (NO RACE VARIABLE): 58.5 ML/MIN/1.73 M^2
GLUCOSE SERPL-MCNC: 132 MG/DL (ref 70–110)
HCT VFR BLD AUTO: 36.6 % (ref 40–54)
HGB BLD-MCNC: 11.7 G/DL (ref 14–18)
IMM GRANULOCYTES # BLD AUTO: 0.02 K/UL (ref 0–0.04)
IMM GRANULOCYTES NFR BLD AUTO: 0.4 % (ref 0–0.5)
LYMPHOCYTES # BLD AUTO: 1.4 K/UL (ref 1–4.8)
LYMPHOCYTES NFR BLD: 26.2 % (ref 18–48)
MCH RBC QN AUTO: 26 PG (ref 27–31)
MCHC RBC AUTO-ENTMCNC: 32 G/DL (ref 32–36)
MCV RBC AUTO: 81 FL (ref 82–98)
MONOCYTES # BLD AUTO: 0.6 K/UL (ref 0.3–1)
MONOCYTES NFR BLD: 10.1 % (ref 4–15)
NEUTROPHILS # BLD AUTO: 3.4 K/UL (ref 1.8–7.7)
NEUTROPHILS NFR BLD: 62.5 % (ref 38–73)
NRBC BLD-RTO: 0 /100 WBC
PLATELET # BLD AUTO: 237 K/UL (ref 150–450)
PMV BLD AUTO: 10.4 FL (ref 9.2–12.9)
POTASSIUM SERPL-SCNC: 4.3 MMOL/L (ref 3.5–5.1)
PROT SERPL-MCNC: 8.3 G/DL (ref 6–8.4)
RBC # BLD AUTO: 4.5 M/UL (ref 4.6–6.2)
SODIUM SERPL-SCNC: 136 MMOL/L (ref 136–145)
WBC # BLD AUTO: 5.42 K/UL (ref 3.9–12.7)

## 2022-08-19 PROCEDURE — 99499 RISK ADDL DX/OHS AUDIT: ICD-10-PCS | Mod: S$GLB,,, | Performed by: NURSE PRACTITIONER

## 2022-08-19 PROCEDURE — 99999 PR PBB SHADOW E&M-EST. PATIENT-LVL V: ICD-10-PCS | Mod: PBBFAC,,, | Performed by: NURSE PRACTITIONER

## 2022-08-19 PROCEDURE — 3044F HG A1C LEVEL LT 7.0%: CPT | Mod: CPTII,S$GLB,, | Performed by: NURSE PRACTITIONER

## 2022-08-19 PROCEDURE — 3078F PR MOST RECENT DIASTOLIC BLOOD PRESSURE < 80 MM HG: ICD-10-PCS | Mod: CPTII,S$GLB,, | Performed by: NURSE PRACTITIONER

## 2022-08-19 PROCEDURE — 3075F SYST BP GE 130 - 139MM HG: CPT | Mod: CPTII,S$GLB,, | Performed by: NURSE PRACTITIONER

## 2022-08-19 PROCEDURE — 1160F RVW MEDS BY RX/DR IN RCRD: CPT | Mod: CPTII,S$GLB,, | Performed by: NURSE PRACTITIONER

## 2022-08-19 PROCEDURE — 1159F PR MEDICATION LIST DOCUMENTED IN MEDICAL RECORD: ICD-10-PCS | Mod: CPTII,S$GLB,, | Performed by: NURSE PRACTITIONER

## 2022-08-19 PROCEDURE — 3078F DIAST BP <80 MM HG: CPT | Mod: CPTII,S$GLB,, | Performed by: NURSE PRACTITIONER

## 2022-08-19 PROCEDURE — 1159F MED LIST DOCD IN RCRD: CPT | Mod: CPTII,S$GLB,, | Performed by: NURSE PRACTITIONER

## 2022-08-19 PROCEDURE — 1160F PR REVIEW ALL MEDS BY PRESCRIBER/CLIN PHARMACIST DOCUMENTED: ICD-10-PCS | Mod: CPTII,S$GLB,, | Performed by: NURSE PRACTITIONER

## 2022-08-19 PROCEDURE — 4010F PR ACE/ARB THEARPY RXD/TAKEN: ICD-10-PCS | Mod: CPTII,S$GLB,, | Performed by: NURSE PRACTITIONER

## 2022-08-19 PROCEDURE — 3008F BODY MASS INDEX DOCD: CPT | Mod: CPTII,S$GLB,, | Performed by: NURSE PRACTITIONER

## 2022-08-19 PROCEDURE — 3075F PR MOST RECENT SYSTOLIC BLOOD PRESS GE 130-139MM HG: ICD-10-PCS | Mod: CPTII,S$GLB,, | Performed by: NURSE PRACTITIONER

## 2022-08-19 PROCEDURE — 99215 OFFICE O/P EST HI 40 MIN: CPT | Mod: S$GLB,,, | Performed by: NURSE PRACTITIONER

## 2022-08-19 PROCEDURE — 99499 UNLISTED E&M SERVICE: CPT | Mod: S$GLB,,, | Performed by: NURSE PRACTITIONER

## 2022-08-19 PROCEDURE — 80053 COMPREHEN METABOLIC PANEL: CPT | Performed by: INTERNAL MEDICINE

## 2022-08-19 PROCEDURE — 3044F PR MOST RECENT HEMOGLOBIN A1C LEVEL <7.0%: ICD-10-PCS | Mod: CPTII,S$GLB,, | Performed by: NURSE PRACTITIONER

## 2022-08-19 PROCEDURE — 99215 PR OFFICE/OUTPT VISIT, EST, LEVL V, 40-54 MIN: ICD-10-PCS | Mod: S$GLB,,, | Performed by: NURSE PRACTITIONER

## 2022-08-19 PROCEDURE — 36415 COLL VENOUS BLD VENIPUNCTURE: CPT | Performed by: INTERNAL MEDICINE

## 2022-08-19 PROCEDURE — 3008F PR BODY MASS INDEX (BMI) DOCUMENTED: ICD-10-PCS | Mod: CPTII,S$GLB,, | Performed by: NURSE PRACTITIONER

## 2022-08-19 PROCEDURE — 4010F ACE/ARB THERAPY RXD/TAKEN: CPT | Mod: CPTII,S$GLB,, | Performed by: NURSE PRACTITIONER

## 2022-08-19 PROCEDURE — 99999 PR PBB SHADOW E&M-EST. PATIENT-LVL V: CPT | Mod: PBBFAC,,, | Performed by: NURSE PRACTITIONER

## 2022-08-19 PROCEDURE — 85025 COMPLETE CBC W/AUTO DIFF WBC: CPT | Performed by: INTERNAL MEDICINE

## 2022-08-19 RX ORDER — SODIUM CHLORIDE 9 MG/ML
INJECTION, SOLUTION INTRAVENOUS CONTINUOUS
Status: CANCELLED | OUTPATIENT
Start: 2022-08-20

## 2022-08-19 RX ORDER — HEPARIN 100 UNIT/ML
500 SYRINGE INTRAVENOUS
Status: CANCELLED | OUTPATIENT
Start: 2022-08-20

## 2022-08-19 RX ORDER — METFORMIN HYDROCHLORIDE 500 MG/1
1000 TABLET ORAL 2 TIMES DAILY WITH MEALS
Qty: 120 TABLET | Refills: 0 | Status: SHIPPED | OUTPATIENT
Start: 2022-08-19 | End: 2022-09-18 | Stop reason: SDUPTHER

## 2022-08-19 RX ORDER — SODIUM CHLORIDE 0.9 % (FLUSH) 0.9 %
10 SYRINGE (ML) INJECTION
Status: CANCELLED | OUTPATIENT
Start: 2022-08-20

## 2022-08-19 RX ORDER — METFORMIN HYDROCHLORIDE 500 MG/1
1000 TABLET ORAL 2 TIMES DAILY WITH MEALS
Qty: 120 TABLET | Refills: 0 | OUTPATIENT
Start: 2022-08-19 | End: 2022-09-18

## 2022-08-20 ENCOUNTER — INFUSION (OUTPATIENT)
Dept: INFUSION THERAPY | Facility: HOSPITAL | Age: 47
End: 2022-08-20
Attending: INTERNAL MEDICINE
Payer: MEDICARE

## 2022-08-20 VITALS
HEART RATE: 62 BPM | RESPIRATION RATE: 18 BRPM | SYSTOLIC BLOOD PRESSURE: 110 MMHG | DIASTOLIC BLOOD PRESSURE: 62 MMHG | OXYGEN SATURATION: 95 % | TEMPERATURE: 98 F

## 2022-08-20 DIAGNOSIS — C50.021 MALIGNANT NEOPLASM INVOLVING BOTH NIPPLE AND AREOLA OF RIGHT BREAST IN MALE, ESTROGEN RECEPTOR NEGATIVE: Primary | ICD-10-CM

## 2022-08-20 DIAGNOSIS — Z17.1 MALIGNANT NEOPLASM INVOLVING BOTH NIPPLE AND AREOLA OF RIGHT BREAST IN MALE, ESTROGEN RECEPTOR NEGATIVE: Primary | ICD-10-CM

## 2022-08-20 PROCEDURE — 96413 CHEMO IV INFUSION 1 HR: CPT

## 2022-08-20 PROCEDURE — 96361 HYDRATE IV INFUSION ADD-ON: CPT

## 2022-08-20 PROCEDURE — 63600175 PHARM REV CODE 636 W HCPCS: Performed by: NURSE PRACTITIONER

## 2022-08-20 PROCEDURE — 25000003 PHARM REV CODE 250: Performed by: NURSE PRACTITIONER

## 2022-08-20 RX ORDER — SODIUM CHLORIDE 0.9 % (FLUSH) 0.9 %
10 SYRINGE (ML) INJECTION
Status: DISCONTINUED | OUTPATIENT
Start: 2022-08-20 | End: 2022-08-20 | Stop reason: HOSPADM

## 2022-08-20 RX ORDER — SODIUM CHLORIDE 9 MG/ML
INJECTION, SOLUTION INTRAVENOUS CONTINUOUS
Status: DISCONTINUED | OUTPATIENT
Start: 2022-08-20 | End: 2022-08-20 | Stop reason: HOSPADM

## 2022-08-20 RX ORDER — HEPARIN 100 UNIT/ML
500 SYRINGE INTRAVENOUS
Status: DISCONTINUED | OUTPATIENT
Start: 2022-08-20 | End: 2022-08-20 | Stop reason: HOSPADM

## 2022-08-20 RX ADMIN — SODIUM CHLORIDE: 0.9 INJECTION, SOLUTION INTRAVENOUS at 10:08

## 2022-08-20 RX ADMIN — HEPARIN SODIUM (PORCINE) LOCK FLUSH IV SOLN 100 UNIT/ML 500 UNITS: 100 SOLUTION at 12:08

## 2022-08-20 RX ADMIN — PACLITAXEL 240 MG: 100 INJECTION, POWDER, LYOPHILIZED, FOR SUSPENSION INTRAVENOUS at 11:08

## 2022-08-20 NOTE — PLAN OF CARE
Pt ambulatory to clinic alone today for Abraxxane and IVF's. Denies any complaints. Port accessed without difficulty. Good blood return. Pt tolerated infusion well. Port deaccessed after flushing. Ambulatory from clinic in [facility].

## 2022-08-21 ENCOUNTER — PATIENT MESSAGE (OUTPATIENT)
Dept: OTHER | Facility: OTHER | Age: 47
End: 2022-08-21
Payer: MEDICARE

## 2022-08-26 RX ORDER — SODIUM CHLORIDE 9 MG/ML
INJECTION, SOLUTION INTRAVENOUS CONTINUOUS
Status: CANCELLED | OUTPATIENT
Start: 2022-08-27

## 2022-08-26 RX ORDER — HEPARIN 100 UNIT/ML
500 SYRINGE INTRAVENOUS
Status: CANCELLED | OUTPATIENT
Start: 2022-08-27

## 2022-08-26 RX ORDER — SODIUM CHLORIDE 0.9 % (FLUSH) 0.9 %
10 SYRINGE (ML) INJECTION
Status: CANCELLED | OUTPATIENT
Start: 2022-08-27

## 2022-08-27 ENCOUNTER — INFUSION (OUTPATIENT)
Dept: INFUSION THERAPY | Facility: HOSPITAL | Age: 47
End: 2022-08-27
Payer: MEDICARE

## 2022-08-27 VITALS
DIASTOLIC BLOOD PRESSURE: 68 MMHG | OXYGEN SATURATION: 100 % | HEART RATE: 65 BPM | SYSTOLIC BLOOD PRESSURE: 122 MMHG | TEMPERATURE: 98 F | RESPIRATION RATE: 18 BRPM

## 2022-08-27 DIAGNOSIS — C50.021 MALIGNANT NEOPLASM INVOLVING BOTH NIPPLE AND AREOLA OF RIGHT BREAST IN MALE, ESTROGEN RECEPTOR NEGATIVE: Primary | ICD-10-CM

## 2022-08-27 DIAGNOSIS — Z17.1 MALIGNANT NEOPLASM INVOLVING BOTH NIPPLE AND AREOLA OF RIGHT BREAST IN MALE, ESTROGEN RECEPTOR NEGATIVE: Primary | ICD-10-CM

## 2022-08-27 PROCEDURE — 25000003 PHARM REV CODE 250: Performed by: INTERNAL MEDICINE

## 2022-08-27 PROCEDURE — 63600175 PHARM REV CODE 636 W HCPCS: Performed by: INTERNAL MEDICINE

## 2022-08-27 PROCEDURE — 96361 HYDRATE IV INFUSION ADD-ON: CPT

## 2022-08-27 PROCEDURE — A4216 STERILE WATER/SALINE, 10 ML: HCPCS | Performed by: INTERNAL MEDICINE

## 2022-08-27 PROCEDURE — 96413 CHEMO IV INFUSION 1 HR: CPT

## 2022-08-27 RX ORDER — SODIUM CHLORIDE 9 MG/ML
INJECTION, SOLUTION INTRAVENOUS CONTINUOUS
Status: DISCONTINUED | OUTPATIENT
Start: 2022-08-27 | End: 2022-08-27 | Stop reason: HOSPADM

## 2022-08-27 RX ORDER — HEPARIN 100 UNIT/ML
500 SYRINGE INTRAVENOUS
Status: DISCONTINUED | OUTPATIENT
Start: 2022-08-27 | End: 2022-08-27 | Stop reason: HOSPADM

## 2022-08-27 RX ORDER — SODIUM CHLORIDE 0.9 % (FLUSH) 0.9 %
10 SYRINGE (ML) INJECTION
Status: DISCONTINUED | OUTPATIENT
Start: 2022-08-27 | End: 2022-08-27 | Stop reason: HOSPADM

## 2022-08-27 RX ADMIN — Medication 10 ML: at 11:08

## 2022-08-27 RX ADMIN — SODIUM CHLORIDE: 0.9 INJECTION, SOLUTION INTRAVENOUS at 09:08

## 2022-08-27 RX ADMIN — PACLITAXEL 240 MG: 100 INJECTION, POWDER, LYOPHILIZED, FOR SUSPENSION INTRAVENOUS at 09:08

## 2022-08-27 RX ADMIN — HEPARIN 500 UNITS: 100 SYRINGE at 11:08

## 2022-08-27 NOTE — PLAN OF CARE
Patient meets parameters for treatment today. Patient tolerated Abraxane and IVF  with VSS and no complications. Patient instructed to call clinic with any problems or concerns. Patient verbalize understanding. AVS given and patient discharged home.

## 2022-08-30 DIAGNOSIS — F43.23 ADJUSTMENT DISORDER WITH MIXED ANXIETY AND DEPRESSED MOOD: ICD-10-CM

## 2022-08-30 RX ORDER — FLUOXETINE HYDROCHLORIDE 20 MG/1
40 CAPSULE ORAL DAILY
Qty: 90 CAPSULE | Refills: 1 | Status: SHIPPED | OUTPATIENT
Start: 2022-08-30 | End: 2022-11-16 | Stop reason: SDUPTHER

## 2022-08-31 ENCOUNTER — OFFICE VISIT (OUTPATIENT)
Dept: PSYCHIATRY | Facility: CLINIC | Age: 47
End: 2022-08-31
Payer: COMMERCIAL

## 2022-08-31 DIAGNOSIS — F43.23 ADJUSTMENT DISORDER WITH MIXED ANXIETY AND DEPRESSED MOOD: Primary | ICD-10-CM

## 2022-08-31 PROCEDURE — 1160F PR REVIEW ALL MEDS BY PRESCRIBER/CLIN PHARMACIST DOCUMENTED: ICD-10-PCS | Mod: CPTII,S$GLB,, | Performed by: PSYCHOLOGIST

## 2022-08-31 PROCEDURE — 4010F ACE/ARB THERAPY RXD/TAKEN: CPT | Mod: CPTII,S$GLB,, | Performed by: PSYCHOLOGIST

## 2022-08-31 PROCEDURE — 3044F PR MOST RECENT HEMOGLOBIN A1C LEVEL <7.0%: ICD-10-PCS | Mod: CPTII,S$GLB,, | Performed by: PSYCHOLOGIST

## 2022-08-31 PROCEDURE — 4010F PR ACE/ARB THEARPY RXD/TAKEN: ICD-10-PCS | Mod: CPTII,S$GLB,, | Performed by: PSYCHOLOGIST

## 2022-08-31 PROCEDURE — 90834 PSYTX W PT 45 MINUTES: CPT | Mod: S$GLB,,, | Performed by: PSYCHOLOGIST

## 2022-08-31 PROCEDURE — 3044F HG A1C LEVEL LT 7.0%: CPT | Mod: CPTII,S$GLB,, | Performed by: PSYCHOLOGIST

## 2022-08-31 PROCEDURE — 99999 PR PBB SHADOW E&M-EST. PATIENT-LVL II: ICD-10-PCS | Mod: PBBFAC,,, | Performed by: PSYCHOLOGIST

## 2022-08-31 PROCEDURE — 1160F RVW MEDS BY RX/DR IN RCRD: CPT | Mod: CPTII,S$GLB,, | Performed by: PSYCHOLOGIST

## 2022-08-31 PROCEDURE — 1159F PR MEDICATION LIST DOCUMENTED IN MEDICAL RECORD: ICD-10-PCS | Mod: CPTII,S$GLB,, | Performed by: PSYCHOLOGIST

## 2022-08-31 PROCEDURE — 1159F MED LIST DOCD IN RCRD: CPT | Mod: CPTII,S$GLB,, | Performed by: PSYCHOLOGIST

## 2022-08-31 PROCEDURE — 99999 PR PBB SHADOW E&M-EST. PATIENT-LVL II: CPT | Mod: PBBFAC,,, | Performed by: PSYCHOLOGIST

## 2022-08-31 PROCEDURE — 90834 PR PSYCHOTHERAPY W/PATIENT, 45 MIN: ICD-10-PCS | Mod: S$GLB,,, | Performed by: PSYCHOLOGIST

## 2022-08-31 NOTE — PROGRESS NOTES
PSYCHO-ONCOLOGY NOTE/ Individual Psychotherapy     Date: 8/31/2022   Site:  [facility]         Therapeutic Intervention: Met with patient.  Outpatient - Behavior modifying psychotherapy 45 min - CPT code 39848    Referring provider:  Kute intially, now Nodurft    Chief complaint/reason for encounter: Met with patient to evaluate psychosocial adaptation to survivorship of metastatic breast cancer  The patient's last visit with me was on 6/23/2022.    Objective:    Paul Li Jr. arrived promptly for the session.  Mr. Li was independently ambulatory at the time of session. The patient was fully cooperative throughout the session. His wife accompanied him, with his permission.  Appearance: age appropriate, casually  dressed, well groomed  Behavior/Cooperation: friendly and cooperative  Speech: normal in rate, volume, and tone and appropriate quality, quantity and organization of sentences  Mood: happy  Affect: mood congruent, full range and appropriate  Thought Process: goal-directed, logical  Thought Content: normal,  No delusions or paranoia; did not appear to be responding to internal stimuli during the session  Orientation: grossly intact  Memory: Grossly intact  Attention Span/Concentration: Attends to session without distraction; reports no difficulty  Fund of Knowledge: average  Estimate of Intelligence: average from verbal skills and history  Cognition: grossly intact  Insight: patient has awareness of illness; adequate insight into own behavior and behavior of others  Judgment: the patient's behavior is adequate to circumstances      Interval history and content of current session: Patient reports improvement in his marital relationship and subsequent improved mood since last visit. 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 Patient discussed cognitive dysfunction. Coping strategies discussed. Areas of stress: financial  "strain, recovery from COVID, new grandson will need heart surgery at birth.     "Rarely" thinks about cancer. Discussed other health challenges. HTN- not taking home pressures regularly, discussed rationale, encouraged adherence); CPAP- no longer feels better when uses, 100 lb weight loss since last PSG, encouraged reconnection with sleep medicine; lymphedema- still waiting for appt       Risk parameters:   Patient reports no suicidal ideation  Patient reports no homicidal ideation  Patient reports no self-injurious behavior  Patient reports no violent behavior   Safety needs:  None at this time      Verbal deficits: None     Patient's response to intervention:The patient's response to intervention is accepting, motivated.     Progress toward goals and other mental status changes:  The patient's progress toward goals is good.      Progress to date:Progress as Expected      Goals from last visit: Met      Patient reported outcomes:      Distress Thermometer:   Distress Score    Distress Score: 4        Practical Problems Physical Problems                                                   Family Problems                                         Emotional Problems                                                         Spiritual/Religions Concerns               Other Problems              PHQ-9= 7; Initial visit: 8       BILL-7=7- does not meet screener; Initial visit: 8     GAD7 12/21/2020   1. Feeling nervous, anxious, or on edge? 0   2. Not being able to stop or control worrying? 0   3. Worrying too much about different things? 0   4. Trouble relaxing? 1   5. Being so restless that it is hard to sit still? 0   6. Becoming easily annoyed or irritable? 1   7. Feeling afraid as if something awful might happen? 0   BILL-7 Score 2         Client Strengths: verbal, intelligent, successful, good social support, commitment to wellness, strong guadalupe, strong cultural traditions      Treatment Plan:individual " psychotherapy  Target symptoms: adjustment  Why chosen therapy is appropriate versus another modality: relevant to diagnosis, patient responds to this modality, evidence based practice  Outcome monitoring methods: self-report, observation, checklist/rating scale  Therapeutic intervention type: behavior modifying psychotherapy  Prognosis: Good      Behavioral goals:    Exercise:    Stress management:     Social engagement:  Plan October cruise   Nutrition:    Smoking Cessation:   Therapy: return to sleep medicine    Regular home BP monitoring    Lymphedema therapy    Continue increased antidepressant dose- as per MD    PT job      Return to clinic: 3 weeks     Length of Service (minutes direct face-to-face contact): 45      ICD-10-CM ICD-9-CM   1. Adjustment disorder with mixed anxiety and depressed mood  F43.23 309.28         Matthew Sandoval, PhD  [place] License #[id number]

## 2022-09-02 ENCOUNTER — TELEPHONE (OUTPATIENT)
Dept: INFUSION THERAPY | Facility: HOSPITAL | Age: 47
End: 2022-09-02
Payer: MEDICARE

## 2022-09-02 ENCOUNTER — TELEPHONE (OUTPATIENT)
Dept: PALLIATIVE MEDICINE | Facility: CLINIC | Age: 47
End: 2022-09-02
Payer: MEDICARE

## 2022-09-06 ENCOUNTER — OFFICE VISIT (OUTPATIENT)
Dept: PALLIATIVE MEDICINE | Facility: CLINIC | Age: 47
End: 2022-09-06
Payer: MEDICARE

## 2022-09-06 ENCOUNTER — PATIENT MESSAGE (OUTPATIENT)
Dept: HEMATOLOGY/ONCOLOGY | Facility: CLINIC | Age: 47
End: 2022-09-06
Payer: MEDICARE

## 2022-09-06 ENCOUNTER — PATIENT MESSAGE (OUTPATIENT)
Dept: PHARMACY | Facility: CLINIC | Age: 47
End: 2022-09-06
Payer: MEDICARE

## 2022-09-06 VITALS — SYSTOLIC BLOOD PRESSURE: 129 MMHG | HEART RATE: 75 BPM | DIASTOLIC BLOOD PRESSURE: 69 MMHG

## 2022-09-06 DIAGNOSIS — Z17.1 MALIGNANT NEOPLASM INVOLVING BOTH NIPPLE AND AREOLA OF RIGHT BREAST IN MALE, ESTROGEN RECEPTOR NEGATIVE: Primary | ICD-10-CM

## 2022-09-06 DIAGNOSIS — R19.7 DIARRHEA, UNSPECIFIED TYPE: ICD-10-CM

## 2022-09-06 DIAGNOSIS — R41.840 POOR CONCENTRATION: ICD-10-CM

## 2022-09-06 DIAGNOSIS — C50.021 MALIGNANT NEOPLASM INVOLVING BOTH NIPPLE AND AREOLA OF RIGHT BREAST IN MALE, ESTROGEN RECEPTOR NEGATIVE: Primary | ICD-10-CM

## 2022-09-06 DIAGNOSIS — M79.2 NEUROPATHIC PAIN: ICD-10-CM

## 2022-09-06 DIAGNOSIS — Z51.5 ENCOUNTER FOR PALLIATIVE CARE: ICD-10-CM

## 2022-09-06 DIAGNOSIS — Z71.89 ADVANCED CARE PLANNING/COUNSELING DISCUSSION: ICD-10-CM

## 2022-09-06 DIAGNOSIS — C79.51 BONE METASTASES: ICD-10-CM

## 2022-09-06 DIAGNOSIS — F43.23 ADJUSTMENT DISORDER WITH MIXED ANXIETY AND DEPRESSED MOOD: ICD-10-CM

## 2022-09-06 DIAGNOSIS — G89.3 CANCER RELATED PAIN: ICD-10-CM

## 2022-09-06 DIAGNOSIS — G47.00 INSOMNIA, UNSPECIFIED TYPE: ICD-10-CM

## 2022-09-06 DIAGNOSIS — R53.0 NEOPLASTIC (MALIGNANT) RELATED FATIGUE: ICD-10-CM

## 2022-09-06 PROCEDURE — 1159F MED LIST DOCD IN RCRD: CPT | Mod: CPTII,S$GLB,, | Performed by: STUDENT IN AN ORGANIZED HEALTH CARE EDUCATION/TRAINING PROGRAM

## 2022-09-06 PROCEDURE — 99215 PR OFFICE/OUTPT VISIT, EST, LEVL V, 40-54 MIN: ICD-10-PCS | Mod: S$GLB,,, | Performed by: STUDENT IN AN ORGANIZED HEALTH CARE EDUCATION/TRAINING PROGRAM

## 2022-09-06 PROCEDURE — 4010F ACE/ARB THERAPY RXD/TAKEN: CPT | Mod: CPTII,S$GLB,, | Performed by: STUDENT IN AN ORGANIZED HEALTH CARE EDUCATION/TRAINING PROGRAM

## 2022-09-06 PROCEDURE — 99999 PR PBB SHADOW E&M-EST. PATIENT-LVL II: CPT | Mod: PBBFAC,,, | Performed by: STUDENT IN AN ORGANIZED HEALTH CARE EDUCATION/TRAINING PROGRAM

## 2022-09-06 PROCEDURE — 4010F PR ACE/ARB THEARPY RXD/TAKEN: ICD-10-PCS | Mod: CPTII,S$GLB,, | Performed by: STUDENT IN AN ORGANIZED HEALTH CARE EDUCATION/TRAINING PROGRAM

## 2022-09-06 PROCEDURE — 3078F DIAST BP <80 MM HG: CPT | Mod: CPTII,S$GLB,, | Performed by: STUDENT IN AN ORGANIZED HEALTH CARE EDUCATION/TRAINING PROGRAM

## 2022-09-06 PROCEDURE — 3044F PR MOST RECENT HEMOGLOBIN A1C LEVEL <7.0%: ICD-10-PCS | Mod: CPTII,S$GLB,, | Performed by: STUDENT IN AN ORGANIZED HEALTH CARE EDUCATION/TRAINING PROGRAM

## 2022-09-06 PROCEDURE — 99999 PR PBB SHADOW E&M-EST. PATIENT-LVL II: ICD-10-PCS | Mod: PBBFAC,,, | Performed by: STUDENT IN AN ORGANIZED HEALTH CARE EDUCATION/TRAINING PROGRAM

## 2022-09-06 PROCEDURE — 3074F PR MOST RECENT SYSTOLIC BLOOD PRESSURE < 130 MM HG: ICD-10-PCS | Mod: CPTII,S$GLB,, | Performed by: STUDENT IN AN ORGANIZED HEALTH CARE EDUCATION/TRAINING PROGRAM

## 2022-09-06 PROCEDURE — 3074F SYST BP LT 130 MM HG: CPT | Mod: CPTII,S$GLB,, | Performed by: STUDENT IN AN ORGANIZED HEALTH CARE EDUCATION/TRAINING PROGRAM

## 2022-09-06 PROCEDURE — 3078F PR MOST RECENT DIASTOLIC BLOOD PRESSURE < 80 MM HG: ICD-10-PCS | Mod: CPTII,S$GLB,, | Performed by: STUDENT IN AN ORGANIZED HEALTH CARE EDUCATION/TRAINING PROGRAM

## 2022-09-06 PROCEDURE — 1159F PR MEDICATION LIST DOCUMENTED IN MEDICAL RECORD: ICD-10-PCS | Mod: CPTII,S$GLB,, | Performed by: STUDENT IN AN ORGANIZED HEALTH CARE EDUCATION/TRAINING PROGRAM

## 2022-09-06 PROCEDURE — 99215 OFFICE O/P EST HI 40 MIN: CPT | Mod: S$GLB,,, | Performed by: STUDENT IN AN ORGANIZED HEALTH CARE EDUCATION/TRAINING PROGRAM

## 2022-09-06 PROCEDURE — 1160F RVW MEDS BY RX/DR IN RCRD: CPT | Mod: CPTII,S$GLB,, | Performed by: STUDENT IN AN ORGANIZED HEALTH CARE EDUCATION/TRAINING PROGRAM

## 2022-09-06 PROCEDURE — 1160F PR REVIEW ALL MEDS BY PRESCRIBER/CLIN PHARMACIST DOCUMENTED: ICD-10-PCS | Mod: CPTII,S$GLB,, | Performed by: STUDENT IN AN ORGANIZED HEALTH CARE EDUCATION/TRAINING PROGRAM

## 2022-09-06 PROCEDURE — 3044F HG A1C LEVEL LT 7.0%: CPT | Mod: CPTII,S$GLB,, | Performed by: STUDENT IN AN ORGANIZED HEALTH CARE EDUCATION/TRAINING PROGRAM

## 2022-09-06 NOTE — PROGRESS NOTES
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 Linn  - currently on disease directed therapy    Encounter for palliative care/Advanced care planning  - patient decisional  - patient by himself during visit today  - no ACP documents uploaded into EMR  - goals: life prolonging  - philosophy of Palliative Medicine reviewed with patient at first visit  - new patient folder given to and briefly reviewed with patient at first visit  - ACP booklet given to and reviewed with patient at first visit by Sarabjit Casas RN  - code status not specifically discussed at this visit  - will follow up at future appointments for any questions/concerns that arise after patient reviews new patient information   - did not discuss today due to increased emotional symptom burden    Adjustment disorder with mixed anxiety and depressed mood  - patient reports continued severe anxiety and depression at this time.   - he spoke openly at previous visit about multiple stressors and how the support he needs has changed over time; patient discussed his support system as well and how their interactions with him affect his overall emotional needs  - today patient states that his wife has gone to visits with him to Dr. Sandoval, which has seemed to help their relationship/communication  - patient stating that he is still having difficulty with other members of his support system. Challenged patient about his communication of his needs to his support system, such as his son. Discussed that it is okay to tell them he needs increased communication but also alleviate the fears of his disease   - patient also having moderate spiritual distress. Discussed setting aside at least 5 minutes  in a quiet room with his bible. Set a timer and just spend the time in prayer, as he feels he has lost some of that practice since recurrence  - emotional support provided today  - discussed with patient about setting a goal of something he enjoys to do daily  - will request that pall med sw call patient in 2 weeks for check in and further support as needed  - continue prozac with no changes in dosing at this time    Poor concentration/neoplasm related fatigue  - patient stating that he feels trouble focusing and still has some decreased energy since his covid infection  - he finds himself easily distracted when trying to do a task  - discussed that he should make a list, that is easily accessible, such as on his phone. Patient should write down daily his goals for the day and work towards them and then check them off when completed  - [employer] states he is able to accomplish some work when he works at his pace. He does take breaks and tries to stay hydrated   - will continue to monitor    Insomnia  - patient stated previously he obtains about 4-4.5 hours of sleep a night  - he denies any issues with insomnia today; he does state that at times he will develop thoughts at night  - previously he feels much better with use of cpap. He states he sleeps well and has energy when he wakes up in the morning.   - etiology likely multifactorial including need for CPAP (recently approved by insurance) as well as no consistent sleep schedule for years  - tip sheet for better sleep in new patient folder for patient to review, including information about sleep schedule  - no need for pharmacologic intervention at this time  - will continue close monitoring    Diarrhea  - patient reports intermittent diarrhea  - it has improved recently with lomotil once a day  - refill sent in for patient prior to today's visit  - encouraged patient to stay hydrated for good bowel movements  - will continue to monitor    Cancer related  pain/Neuropathic pain  - patient denies any pain at this time. He states he wants to continue to be more active.   - he states his main issue is neuropathy in his bilateral lower extremities with L > R  - patient also using compression sleeve for right arm lymphedema  - patient reports that he uses his Norco maybe once a day  - he uses gabapentin 300 mg TID with moderate relief  - previously recommended increasing gabapentin to 600 mg qhs and continue 300 mg at other times  - opioid safety sheet in new patient folder for patient to review  - will continue to monitor    Understanding of illness/Prognosis: patient has good understanding of his illness; prognosis is guarded    Goals of care: life prolonging    Follow up: ~ 6 weeks    Patient's encounter and above plan of care discussed with patient's oncologist    SUBJECTIVE:     History of Present Illness:  Patient is a 46 y.o. year old male with arthritis, DM, HTN, and right sided breast cancer presents to Palliative Medicine for symptom management and ACP. Please see oncology notes for full details of oncologic history and treatment course.     09/02/2022:  [place]  reviewed and summarized:  08/14/2022 Gabapentin 300 mg Disp: 90 for 30 days  08/09/2022 Hydrocodone-APAP  mg Disp: 90 for 23 days  08/01/2022 Alprazolam 0.5 mg disp: 60 for 20 days    Patient doing well with Abraxane and PO Aplelisib. Patient has lymphedema. Patient has missed or rescheduled multiple appointments since last visit. Today patient states he is doing okay. Patient still having increased anxiety/depression. He is being followed by Dr. Sandoval. He states that his wife has come to those appointments, which has been helpful. Patient is still having some difficulty with communicating his needs with other members of his support system. Patient denies any pain. He is still feeling some fatigue since his covid infection, though he is able to be more active. He reports that he is able to work  at his own pace and takes breaks. He is trying to stay hydrated. Patient reports intermittent thoughts at night. His lomotil is working. He is having some difficulty with concentrating.     02/11/2022:  [place]  reviewed and summarized:  01/24/2022 Norco  mg Disp: 90 for 23 days    Today patient states he is doing much better. He discussed about how he and his wife had a long discussion, and he finds that they are in a much better place. He is open to suggestions regarding dating his wife and himself. Patient reporting his sleeping has improved too. He still has some diarrhea.     12/13/2021:  [place]  reviewed and summarized:  11/11/2021 Norco  mg Disp: 90 for 23 days  11/10/2021 Alprazolam 0.5 mg Disp: 60 for 20 days  11/05/2021 Lomotil 2.5-0.035 mg Disp: 40 for 10 days    Today patient states his diarrhea has improved with use of lomotil once a day. Patient also had significant improvement in his sleep and energy with use of cpap. Patient has been having increased emotional distress due to multiple stressors. Please see SW note for further details.     10/11/2021:  [place]  reviewed and summarized:  10/02/2021 Norco 5-325 mg Disp: 28 for 7 days    Patient presents to clinic by himself today. He reports his pain has significantly improved, and he is using Norco only once a week if that often. Patient is compliant with his gabapentin for neuropathic pain in his lower extremities. He does not sleep much overnight, only 4-4.5 hours. Patient has noticed improvement in his mood with prozac. He does find himself having increase in his emotions at times, both positive and negative. Patient spoke about how the initial diagnosis as well as recurrence has really affected him. He is open to learning about ACP.     Past Medical History:   Diagnosis Date    Arthritis     Breast cancer     Cancer     R breast    Diabetes mellitus     HTN (hypertension)     Leg edema, right     Prediabetes     Seizures     at age 18 -  stress related    Therapy     marital counseling     Past Surgical History:   Procedure Laterality Date    AXILLARY NODE DISSECTION Right 11/22/2019    Procedure: LYMPHADENECTOMY, AXILLARY RIGHT;  Surgeon: Shima Whyte MD;  Location: [facility];  Service: General;  Laterality: Right;    AXILLARY NODE DISSECTION Right 12/17/2019    Procedure: LYMPHADENECTOMY, AXILLARY;  Surgeon: Shima Whyte MD;  [facility];  Service: General;  Laterality: Right;    BREAST BIOPSY      INSERTION OF TUNNELED CENTRAL VENOUS CATHETER (CVC) WITH SUBCUTANEOUS PORT Left 5/24/2019    Procedure: [id number];  Surgeon: Shima Whyte MD;  [facility];  Service: General;  Laterality: Left;    INSERTION OF TUNNELED CENTRAL VENOUS CATHETER (CVC) WITH SUBCUTANEOUS PORT Left 9/17/2021    Procedure: INSERTION, SINGLE LUMEN CATHETER WITH PORT, WITH FLUOROSCOPIC GUIDANCE, right;  Surgeon: Gloria Holliday MD;  Location: [facility] OR [address] [place] [address];  Service: General;  Laterality: Left;  rapid covid test    SENTINEL LYMPH NODE BIOPSY Right 11/22/2019    Procedure: BIOPSY, LYMPH NODE, SENTINEL RIGHT;  Surgeon: Shima Whyte MD;  Location: [facility];  Service: General;  Laterality: Right;    SIMPLE MASTECTOMY Right 11/22/2019    Procedure: MASTECTOMY, SIMPLE RIGHT (CONSENT AM OF) 2.5 hr case;  Surgeon: Shima Whyte MD;  Location: [facility];  Service: General;  Laterality: Right;     Family History   Problem Relation Age of Onset    Hypertension Mother     Diabetes Mother     Hypertension Father     Lupus Father     Post-traumatic stress disorder Brother     No Known Problems Maternal Grandmother     Colon cancer Maternal Grandfather     Breast cancer Paternal Grandmother     No Known Problems Paternal Grandfather     No Known Problems Sister     No Known Problems Maternal Aunt     No Known Problems Maternal Uncle     Fibromyalgia Paternal Aunt     Lupus Paternal Aunt     No Known Problems Paternal Uncle     No Known Problems  Brother     No Known Problems Sister     No Known Problems Son     No Known Problems Son     No Known Problems Son     No Known Problems Son      Review of patient's allergies indicates:  No Known Allergies    Medications:    Current Outpatient Medications:     alpelisib 300 mg/day (150 mg x 2) Tab, Take 2 tablets (300 mg) by mouth once daily., Disp: 60 tablet, Rfl: 3    ALPRAZolam (XANAX) 0.5 MG tablet, Take 1 tablet (0.5 mg total) by mouth 3 (three) times daily as needed for Insomnia or Anxiety., Disp: 60 tablet, Rfl: 0    amLODIPine (NORVASC) 10 MG tablet, TAKE 1 TABLET (10 MG) BY MOUTH EVERY DAY, Disp: 90 tablet, Rfl: 3    calcium-vitamin D3 (OYSTER SHELL CALCIUM-VIT D3) 500 mg-5 mcg (200 unit) per tablet, Take 1 tablet by mouth 2 (two) times daily., Disp: 180 tablet, Rfl: 3    diphenoxylate-atropine 2.5-0.025 mg (LOMOTIL) 2.5-0.025 mg per tablet, Take 1 tablet by mouth 4 (four) times daily as needed for Diarrhea., Disp: 60 tablet, Rfl: 2    dulaglutide (TRULICITY) 1.5 mg/0.5 mL pen injector, Inject 1.5 mg into the skin once a week., Disp: 4 pen, Rfl: 2    FLUoxetine 20 MG capsule, Take 2 capsules (40 mg total) by mouth once daily., Disp: 90 capsule, Rfl: 1    gabapentin (NEURONTIN) 300 MG capsule, Take 1 capsule (300 mg total) by mouth 3 (three) times daily., Disp: 90 capsule, Rfl: 11    hydroCHLOROthiazide (HYDRODIURIL) 25 MG tablet, TAKE 1 TABLET BY MOUTH ONCE DAILY, Disp: 90 tablet, Rfl: 3    HYDROcodone-acetaminophen (NORCO)  mg per tablet, Take 1 tablet by mouth every 6 (six) hours as needed for Pain., Disp: 90 tablet, Rfl: 0    insulin detemir U-100 (LEVEMIR FLEXTOUCH U-100 INSULN) 100 unit/mL (3 mL) InPn pen, Inject 21 Units into the skin every evening., Disp: 6 mL, Rfl: 2    lancets 33 gauge Misc, 1 lancet by Misc.(Non-Drug; Combo Route) route once daily., Disp: 100 each, Rfl: 3    lancing device Misc, 1 Device by Misc.(Non-Drug; Combo Route) route 2 (two) times daily with meals., Disp: 1 each,  "Rfl: 0    LIDOcaine-prilocaine (EMLA) cream, Apply topically as needed., Disp: 30 g, Rfl: 0    losartan (COZAAR) 50 MG tablet, Take 2 tablets by mouth once daily, Disp: 180 tablet, Rfl: 3    metFORMIN (GLUCOPHAGE) 500 MG tablet, Take 2 tablets (1,000 mg total) by mouth 2 (two) times daily with meals. Needs appointment for future refills, Disp: 120 tablet, Rfl: 0    pen needle, diabetic (BD ULTRA-FINE TANESHA PEN NEEDLE) 32 gauge x 5/32" Ndle, Use as directed with novolog and levemir, Disp: 100 each, Rfl: 5    tadalafiL (CIALIS) 5 MG tablet, Take 2 tablets (10 mg total) by mouth daily as needed for Erectile Dysfunction., Disp: 20 tablet, Rfl: 1  No current facility-administered medications for this visit.    Facility-Administered Medications Ordered in Other Visits:     0.9%  NaCl infusion, , Intravenous, Continuous, Rosa Linn MD, Stopped at 11/19/21 1634    alteplase injection 2 mg, 2 mg, Intra-Catheter, PRN, Rosa Linn MD    heparin, porcine (PF) 100 unit/mL injection flush 500 Units, 500 Units, Intravenous, 1 time in Clinic/HOD, Richa Bahena MD    heparin, porcine (PF) 100 unit/mL injection flush 500 Units, 500 Units, Intravenous, PRN, Rosa Linn MD    heparin, porcine (PF) 100 unit/mL injection flush 500 Units, 500 Units, Intravenous, PRN, Rosa Linn MD, 500 Units at 11/19/21 1635    sodium chloride 0.9% 250 mL flush bag, , Intravenous, 1 time in Clinic/HOD, Rosa Linn MD    sodium chloride 0.9% flush 10 mL, 10 mL, Intravenous, 1 time in Clinic/HOD, Richa Bahena MD    sodium chloride 0.9% flush 10 mL, 10 mL, Intravenous, PRN, Rosa Linn MD, 10 mL at 11/19/21 1501    sodium chloride 0.9% flush 10 mL, 10 mL, Intravenous, PRN, Rosa Linn MD, 10 mL at 11/19/21 1635    OBJECTIVE:       ROS:  Review of Systems   Constitutional:  Positive for activity change and fatigue.   HENT: Negative.     Eyes: Negative.    Respiratory: Negative.     Cardiovascular: Negative.    Gastrointestinal:  " Positive for diarrhea (improved). Negative for abdominal pain and nausea.   Genitourinary: Negative.    Musculoskeletal:  Positive for myalgias.        Right arm lymphedema   Skin: Negative.    Neurological:         + neuropathic pain in bilateral lower extremities   Psychiatric/Behavioral:  Positive for dysphoric mood and sleep disturbance. The patient is nervous/anxious.    All other systems reviewed and are negative.    Review of Symptoms      Symptom Assessment (ESAS 0-10 Scale)  Pain:  0  Dyspnea:  0  Anxiety:  7  Nausea:  0  Depression:  7  Anorexia:  0  Fatigue:  4  Insomnia:  3  Restlessness:  0  Agitation:  0     CAM / Delirium:  Negative  Constipation:  Negative  Diarrhea:  Positive    Anxiety:  Is nervous/anxious    Bowel Management Plan (BMP):  Yes      Pain Assessment:  OME in 24 hours:  5-10  Location(s): leg    Leg       Location: bilateral        Quality: Tingling        Quantity: 0/10 in intensity month(s) ago, unchanged        Aggravating Factors: None        Alleviating Factors: Opiates        Associated Symptoms: None    Modified Mikal Scale:  0    ECOG Performance Status ndGndrndanddndend:nd nd2nd Living Arrangements:  Lives with spouse    Psychosocial/Cultural: Patient lives with his wife. He has three sons (one set of twins)    Spiritual:  F - Mariella and Belief:  Yes  I - Importance:  High  A - Address in Care:  Yes    Advance Care Planning   Advance Directives:   Living Will: No    LaPOST: No    Do Not Resuscitate Status: No    Medical Power of : No    Agent's Name:  Efraín Li   Agent's Contact Number:  [phone]    Decision Making:  Patient answered questions        Physical Exam:  Vitals:     Vitals:    09/06/22 0955   BP: 129/69   Pulse: 75     Physical Exam  Vitals reviewed.   Constitutional:       General: He is not in acute distress.     Appearance: He is not ill-appearing.   HENT:      Head: Normocephalic and atraumatic.      Right Ear: External ear normal.      Left Ear: External ear  "normal.   Eyes:      General: No scleral icterus.        Right eye: No discharge.         Left eye: No discharge.   Neck:      Comments: Trachea midline  Pulmonary:      Effort: Pulmonary effort is normal. No respiratory distress.   Abdominal:      General: Abdomen is flat. There is no distension.   Musculoskeletal:         General: Swelling (right arm lymphedema) present. No deformity or signs of injury.      Cervical back: Normal range of motion.   Skin:     Coloration: Skin is not pale.      Findings: No lesion or rash.   Neurological:      Mental Status: He is alert and oriented to person, place, and time.      Gait: Gait normal.   Psychiatric:         Attention and Perception: Attention normal.         Mood and Affect: Mood is depressed.         Speech: Speech normal.         Cognition and Memory: Cognition normal.         Judgment: Judgment normal.       Labs:  CBC:   WBC   Date Value Ref Range Status   08/26/2022 5.10 3.90 - 12.70 K/uL Final     Hemoglobin   Date Value Ref Range Status   08/26/2022 11.5 (L) 14.0 - 18.0 g/dL Final     Hematocrit   Date Value Ref Range Status   08/26/2022 35.1 (L) 40.0 - 54.0 % Final     MCV   Date Value Ref Range Status   08/26/2022 79 (L) 82 - 98 fL Final     Platelets   Date Value Ref Range Status   08/26/2022 196 150 - 450 K/uL Final       LFT:   Lab Results   Component Value Date    AST 22 08/26/2022    ALKPHOS 85 08/26/2022    BILITOT 1.2 (H) 08/26/2022       Albumin:   Albumin   Date Value Ref Range Status   08/26/2022 4.3 3.5 - 5.2 g/dL Final     Protein:   Total Protein   Date Value Ref Range Status   08/26/2022 8.3 6.0 - 8.4 g/dL Final       Radiology:I have reviewed all pertinent imaging results/findings within the past 24 hours.    06/06/2022 NM PET CT: "Interval increased uptake in the T7 body away from sclerosis and T12 body involving a region of prior sclerosis.  Differential considerations for T7 include focal nodular hyperplasia of marrow versus early, CT " "occult metastasis, and differential considerations for T12 include active osteoblastic metastasis versus healing metastasis. Other sclerotic lesions demonstrate uptake similar to normal marrow suggesting response to therapy. Nonspecific focal uptake at the right temporal fossa without CT correlate may indicate atypically focal muscular uptake.  Attention on follow-up."      Signature: Angeles Rodriguez MD            "

## 2022-09-09 ENCOUNTER — SPECIALTY PHARMACY (OUTPATIENT)
Dept: PHARMACY | Facility: CLINIC | Age: 47
End: 2022-09-09
Payer: MEDICARE

## 2022-09-09 NOTE — TELEPHONE ENCOUNTER
Specialty Pharmacy - Refill Coordination    Specialty Medication Orders Linked to Encounter      Flowsheet Row Most Recent Value   Medication #1 alpelisib 300 mg/day (150 mg x 2) Tab (Order#[id number], Rx#[id number])            Refill Questions - Documented Responses      Flowsheet Row Most Recent Value   Patient Availability and HIPAA Verification    Does patient want to proceed with activity? Yes   HIPAA/medical authority confirmed? Yes   Relationship to patient of person spoken to? Self   Refill Screening Questions    Changes to allergies? No   Changes to medications? No   New conditions since last clinic visit? No   Unplanned office visit, urgent care, ED, or hospital admission in the last 4 weeks? No   How does patient/caregiver feel medication is working? Good   Financial problems or insurance changes? No   How many doses of your specialty medications were missed in the last 4 weeks? 0   Would patient like to speak to a pharmacist? No   When does the patient need to receive the medication? 09/11/22   Refill Delivery Questions    How will the patient receive the medication? Pickup   When does the patient need to receive the medication? 09/11/22   Shipping Address Home   Address in [facility] confirmed and updated if neccessary? Yes   Expected Copay ($) 0   Is the patient able to afford the medication copay? Yes   Payment Method zero copay   Days supply of Refill 28   Supplies needed? No supplies needed   Refill activity completed? Yes   Refill activity plan Refill scheduled   Shipment/Pickup Date: 09/09/22            Current Outpatient Medications   Medication Sig    alpelisib 300 mg/day (150 mg x 2) Tab Take 2 tablets (300 mg) by mouth once daily.    ALPRAZolam (XANAX) 0.5 MG tablet Take 1 tablet (0.5 mg total) by mouth 3 (three) times daily as needed for Insomnia or Anxiety.    amLODIPine (NORVASC) 10 MG tablet TAKE 1 TABLET (10 MG) BY MOUTH EVERY DAY    calcium-vitamin D3 (OYSTER SHELL CALCIUM-VIT D3)  "500 mg-5 mcg (200 unit) per tablet Take 1 tablet by mouth 2 (two) times daily.    diphenoxylate-atropine 2.5-0.025 mg (LOMOTIL) 2.5-0.025 mg per tablet Take 1 tablet by mouth 4 (four) times daily as needed for Diarrhea.    dulaglutide (TRULICITY) 1.5 mg/0.5 mL pen injector Inject 1.5 mg into the skin once a week.    FLUoxetine 20 MG capsule Take 2 capsules (40 mg total) by mouth once daily.    gabapentin (NEURONTIN) 300 MG capsule Take 1 capsule (300 mg total) by mouth 3 (three) times daily.    hydroCHLOROthiazide (HYDRODIURIL) 25 MG tablet TAKE 1 TABLET BY MOUTH ONCE DAILY    HYDROcodone-acetaminophen (NORCO)  mg per tablet Take 1 tablet by mouth every 6 (six) hours as needed for Pain.    insulin detemir U-100 (LEVEMIR FLEXTOUCH U-100 INSULN) 100 unit/mL (3 mL) InPn pen Inject 21 Units into the skin every evening.    lancets 33 gauge Misc 1 lancet by Misc.(Non-Drug; Combo Route) route once daily.    lancing device Misc 1 Device by Misc.(Non-Drug; Combo Route) route 2 (two) times daily with meals.    LIDOcaine-prilocaine (EMLA) cream Apply topically as needed.    losartan (COZAAR) 50 MG tablet Take 2 tablets by mouth once daily    metFORMIN (GLUCOPHAGE) 500 MG tablet Take 2 tablets (1,000 mg total) by mouth 2 (two) times daily with meals. Needs appointment for future refills    pen needle, diabetic (BD ULTRA-FINE TANESHA PEN NEEDLE) 32 gauge x 5/32" Ndle Use as directed with novolog and levemir    tadalafiL (CIALIS) 5 MG tablet Take 2 tablets (10 mg total) by mouth daily as needed for Erectile Dysfunction.   Last reviewed on 9/6/2022 10:40 AM by Angeles Rodriguez MD    Review of patient's allergies indicates:  No Known Allergies Last reviewed on  9/6/2022 10:40 AM by Angeles Rodriguez      Tasks added this encounter   10/2/2022 - Refill Call (Auto Added)  9/10/2022 - Pickup Reminder   Tasks due within next 3 months   10/12/2022 - Clinical - Follow Up Assesement (180 day)     Shelbie Van - " Specialty Pharmacy  [facility] [place] [address]  Phone: [phone]  Fax: [phone]

## 2022-09-11 DIAGNOSIS — G89.3 NEOPLASM RELATED PAIN: ICD-10-CM

## 2022-09-12 ENCOUNTER — PATIENT MESSAGE (OUTPATIENT)
Dept: ADMINISTRATIVE | Facility: OTHER | Age: 47
End: 2022-09-12
Payer: MEDICARE

## 2022-09-12 ENCOUNTER — PATIENT MESSAGE (OUTPATIENT)
Dept: HEMATOLOGY/ONCOLOGY | Facility: CLINIC | Age: 47
End: 2022-09-12
Payer: MEDICARE

## 2022-09-12 DIAGNOSIS — G89.3 NEOPLASM RELATED PAIN: ICD-10-CM

## 2022-09-12 DIAGNOSIS — C80.1 ANXIETY ASSOCIATED WITH CANCER DIAGNOSIS: ICD-10-CM

## 2022-09-12 DIAGNOSIS — F41.1 ANXIETY ASSOCIATED WITH CANCER DIAGNOSIS: ICD-10-CM

## 2022-09-12 RX ORDER — HYDROCODONE BITARTRATE AND ACETAMINOPHEN 10; 325 MG/1; MG/1
1 TABLET ORAL EVERY 6 HOURS PRN
Qty: 90 TABLET | Refills: 0 | Status: SHIPPED | OUTPATIENT
Start: 2022-09-12 | End: 2022-10-11 | Stop reason: SDUPTHER

## 2022-09-12 RX ORDER — HYDROCODONE BITARTRATE AND ACETAMINOPHEN 10; 325 MG/1; MG/1
1 TABLET ORAL EVERY 6 HOURS PRN
Qty: 90 TABLET | Refills: 0 | Status: SHIPPED | OUTPATIENT
Start: 2022-09-12 | End: 2022-09-12 | Stop reason: SDUPTHER

## 2022-09-12 RX ORDER — ALPRAZOLAM 0.5 MG/1
0.5 TABLET ORAL 3 TIMES DAILY PRN
Qty: 60 TABLET | Refills: 0 | Status: SHIPPED | OUTPATIENT
Start: 2022-09-12 | End: 2022-10-11 | Stop reason: SDUPTHER

## 2022-09-12 RX ORDER — HYDROCODONE BITARTRATE AND ACETAMINOPHEN 10; 325 MG/1; MG/1
1 TABLET ORAL EVERY 6 HOURS PRN
Qty: 90 TABLET | Refills: 0 | OUTPATIENT
Start: 2022-09-12

## 2022-09-15 ENCOUNTER — OFFICE VISIT (OUTPATIENT)
Dept: HEMATOLOGY/ONCOLOGY | Facility: CLINIC | Age: 47
End: 2022-09-15
Payer: MEDICARE

## 2022-09-15 ENCOUNTER — LAB VISIT (OUTPATIENT)
Dept: LAB | Facility: HOSPITAL | Age: 47
End: 2022-09-15
Attending: INTERNAL MEDICINE
Payer: MEDICARE

## 2022-09-15 VITALS
BODY MASS INDEX: 40.43 KG/M2 | HEIGHT: 74 IN | RESPIRATION RATE: 19 BRPM | HEART RATE: 60 BPM | DIASTOLIC BLOOD PRESSURE: 74 MMHG | SYSTOLIC BLOOD PRESSURE: 132 MMHG | OXYGEN SATURATION: 98 % | TEMPERATURE: 98 F | WEIGHT: 315 LBS

## 2022-09-15 DIAGNOSIS — C50.021 MALIGNANT NEOPLASM INVOLVING BOTH NIPPLE AND AREOLA OF RIGHT BREAST IN MALE, ESTROGEN RECEPTOR NEGATIVE: Primary | ICD-10-CM

## 2022-09-15 DIAGNOSIS — E11.9 TYPE 2 DIABETES MELLITUS WITHOUT COMPLICATION, WITH LONG-TERM CURRENT USE OF INSULIN: ICD-10-CM

## 2022-09-15 DIAGNOSIS — I10 ESSENTIAL HYPERTENSION: ICD-10-CM

## 2022-09-15 DIAGNOSIS — C79.51 BONE METASTASES: ICD-10-CM

## 2022-09-15 DIAGNOSIS — Z17.1 MALIGNANT NEOPLASM INVOLVING BOTH NIPPLE AND AREOLA OF RIGHT BREAST IN MALE, ESTROGEN RECEPTOR NEGATIVE: ICD-10-CM

## 2022-09-15 DIAGNOSIS — Z17.1 MALIGNANT NEOPLASM INVOLVING BOTH NIPPLE AND AREOLA OF RIGHT BREAST IN MALE, ESTROGEN RECEPTOR NEGATIVE: Primary | ICD-10-CM

## 2022-09-15 DIAGNOSIS — I89.0 LYMPHEDEMA OF RIGHT UPPER EXTREMITY: ICD-10-CM

## 2022-09-15 DIAGNOSIS — T45.1X5A ANEMIA ASSOCIATED WITH CHEMOTHERAPY: ICD-10-CM

## 2022-09-15 DIAGNOSIS — Z79.4 TYPE 2 DIABETES MELLITUS WITHOUT COMPLICATION, WITH LONG-TERM CURRENT USE OF INSULIN: ICD-10-CM

## 2022-09-15 DIAGNOSIS — C50.021 MALIGNANT NEOPLASM INVOLVING BOTH NIPPLE AND AREOLA OF RIGHT BREAST IN MALE, ESTROGEN RECEPTOR NEGATIVE: ICD-10-CM

## 2022-09-15 DIAGNOSIS — D64.81 ANEMIA ASSOCIATED WITH CHEMOTHERAPY: ICD-10-CM

## 2022-09-15 LAB
ALBUMIN SERPL BCP-MCNC: 3.6 G/DL (ref 3.5–5.2)
ALP SERPL-CCNC: 93 U/L (ref 55–135)
ALT SERPL W/O P-5'-P-CCNC: 15 U/L (ref 10–44)
ANION GAP SERPL CALC-SCNC: 6 MMOL/L (ref 8–16)
AST SERPL-CCNC: 15 U/L (ref 10–40)
BASOPHILS # BLD AUTO: 0.02 K/UL (ref 0–0.2)
BASOPHILS NFR BLD: 0.3 % (ref 0–1.9)
BILIRUB SERPL-MCNC: 1 MG/DL (ref 0.1–1)
BUN SERPL-MCNC: 16 MG/DL (ref 6–20)
CALCIUM SERPL-MCNC: 9.2 MG/DL (ref 8.7–10.5)
CHLORIDE SERPL-SCNC: 103 MMOL/L (ref 95–110)
CO2 SERPL-SCNC: 27 MMOL/L (ref 23–29)
CREAT SERPL-MCNC: 1.2 MG/DL (ref 0.5–1.4)
DIFFERENTIAL METHOD: ABNORMAL
EOSINOPHIL # BLD AUTO: 0 K/UL (ref 0–0.5)
EOSINOPHIL NFR BLD: 0.2 % (ref 0–8)
ERYTHROCYTE [DISTWIDTH] IN BLOOD BY AUTOMATED COUNT: 17.1 % (ref 11.5–14.5)
EST. GFR  (NO RACE VARIABLE): >60 ML/MIN/1.73 M^2
GLUCOSE SERPL-MCNC: 165 MG/DL (ref 70–110)
HCT VFR BLD AUTO: 34.8 % (ref 40–54)
HGB BLD-MCNC: 11.3 G/DL (ref 14–18)
IMM GRANULOCYTES # BLD AUTO: 0.03 K/UL (ref 0–0.04)
IMM GRANULOCYTES NFR BLD AUTO: 0.5 % (ref 0–0.5)
LYMPHOCYTES # BLD AUTO: 1.7 K/UL (ref 1–4.8)
LYMPHOCYTES NFR BLD: 28 % (ref 18–48)
MCH RBC QN AUTO: 25 PG (ref 27–31)
MCHC RBC AUTO-ENTMCNC: 32.5 G/DL (ref 32–36)
MCV RBC AUTO: 77 FL (ref 82–98)
MONOCYTES # BLD AUTO: 0.7 K/UL (ref 0.3–1)
MONOCYTES NFR BLD: 11.4 % (ref 4–15)
NEUTROPHILS # BLD AUTO: 3.6 K/UL (ref 1.8–7.7)
NEUTROPHILS NFR BLD: 59.6 % (ref 38–73)
NRBC BLD-RTO: 0 /100 WBC
PLATELET # BLD AUTO: 222 K/UL (ref 150–450)
PMV BLD AUTO: 9.7 FL (ref 9.2–12.9)
POTASSIUM SERPL-SCNC: 4.4 MMOL/L (ref 3.5–5.1)
PROT SERPL-MCNC: 7.6 G/DL (ref 6–8.4)
RBC # BLD AUTO: 4.52 M/UL (ref 4.6–6.2)
SODIUM SERPL-SCNC: 136 MMOL/L (ref 136–145)
WBC # BLD AUTO: 6.03 K/UL (ref 3.9–12.7)

## 2022-09-15 PROCEDURE — 85025 COMPLETE CBC W/AUTO DIFF WBC: CPT | Performed by: INTERNAL MEDICINE

## 2022-09-15 PROCEDURE — 99214 PR OFFICE/OUTPT VISIT, EST, LEVL IV, 30-39 MIN: ICD-10-PCS | Mod: S$GLB,,, | Performed by: INTERNAL MEDICINE

## 2022-09-15 PROCEDURE — 3075F SYST BP GE 130 - 139MM HG: CPT | Mod: CPTII,S$GLB,, | Performed by: INTERNAL MEDICINE

## 2022-09-15 PROCEDURE — 99999 PR PBB SHADOW E&M-EST. PATIENT-LVL V: ICD-10-PCS | Mod: PBBFAC,,, | Performed by: INTERNAL MEDICINE

## 2022-09-15 PROCEDURE — 36415 COLL VENOUS BLD VENIPUNCTURE: CPT | Performed by: INTERNAL MEDICINE

## 2022-09-15 PROCEDURE — 99999 PR PBB SHADOW E&M-EST. PATIENT-LVL V: CPT | Mod: PBBFAC,,, | Performed by: INTERNAL MEDICINE

## 2022-09-15 PROCEDURE — 1159F PR MEDICATION LIST DOCUMENTED IN MEDICAL RECORD: ICD-10-PCS | Mod: CPTII,S$GLB,, | Performed by: INTERNAL MEDICINE

## 2022-09-15 PROCEDURE — 3044F HG A1C LEVEL LT 7.0%: CPT | Mod: CPTII,S$GLB,, | Performed by: INTERNAL MEDICINE

## 2022-09-15 PROCEDURE — 4010F PR ACE/ARB THEARPY RXD/TAKEN: ICD-10-PCS | Mod: CPTII,S$GLB,, | Performed by: INTERNAL MEDICINE

## 2022-09-15 PROCEDURE — 3008F BODY MASS INDEX DOCD: CPT | Mod: CPTII,S$GLB,, | Performed by: INTERNAL MEDICINE

## 2022-09-15 PROCEDURE — 3044F PR MOST RECENT HEMOGLOBIN A1C LEVEL <7.0%: ICD-10-PCS | Mod: CPTII,S$GLB,, | Performed by: INTERNAL MEDICINE

## 2022-09-15 PROCEDURE — 3078F PR MOST RECENT DIASTOLIC BLOOD PRESSURE < 80 MM HG: ICD-10-PCS | Mod: CPTII,S$GLB,, | Performed by: INTERNAL MEDICINE

## 2022-09-15 PROCEDURE — 3075F PR MOST RECENT SYSTOLIC BLOOD PRESS GE 130-139MM HG: ICD-10-PCS | Mod: CPTII,S$GLB,, | Performed by: INTERNAL MEDICINE

## 2022-09-15 PROCEDURE — 3008F PR BODY MASS INDEX (BMI) DOCUMENTED: ICD-10-PCS | Mod: CPTII,S$GLB,, | Performed by: INTERNAL MEDICINE

## 2022-09-15 PROCEDURE — 1160F RVW MEDS BY RX/DR IN RCRD: CPT | Mod: CPTII,S$GLB,, | Performed by: INTERNAL MEDICINE

## 2022-09-15 PROCEDURE — 1159F MED LIST DOCD IN RCRD: CPT | Mod: CPTII,S$GLB,, | Performed by: INTERNAL MEDICINE

## 2022-09-15 PROCEDURE — 80053 COMPREHEN METABOLIC PANEL: CPT | Performed by: INTERNAL MEDICINE

## 2022-09-15 PROCEDURE — 99214 OFFICE O/P EST MOD 30 MIN: CPT | Mod: S$GLB,,, | Performed by: INTERNAL MEDICINE

## 2022-09-15 PROCEDURE — 1160F PR REVIEW ALL MEDS BY PRESCRIBER/CLIN PHARMACIST DOCUMENTED: ICD-10-PCS | Mod: CPTII,S$GLB,, | Performed by: INTERNAL MEDICINE

## 2022-09-15 PROCEDURE — 3078F DIAST BP <80 MM HG: CPT | Mod: CPTII,S$GLB,, | Performed by: INTERNAL MEDICINE

## 2022-09-15 PROCEDURE — 4010F ACE/ARB THERAPY RXD/TAKEN: CPT | Mod: CPTII,S$GLB,, | Performed by: INTERNAL MEDICINE

## 2022-09-15 RX ORDER — HEPARIN 100 UNIT/ML
500 SYRINGE INTRAVENOUS
Status: CANCELLED | OUTPATIENT
Start: 2022-09-19

## 2022-09-15 RX ORDER — SODIUM CHLORIDE 0.9 % (FLUSH) 0.9 %
10 SYRINGE (ML) INJECTION
Status: CANCELLED | OUTPATIENT
Start: 2022-09-19

## 2022-09-15 RX ORDER — SODIUM CHLORIDE 9 MG/ML
INJECTION, SOLUTION INTRAVENOUS CONTINUOUS
Status: CANCELLED | OUTPATIENT
Start: 2022-09-19

## 2022-09-15 NOTE — PROGRESS NOTES
Subjective:       Patient ID: Paul Li Jr. is a 46 y.o. male.    Chief Complaint: Malignant neoplasm involving both nipple and areola of righ    HPI    Returns for follow up for chemotherapy C13D15 (Abraxane and PO Aplelisib)  Delay with Covid +  Weight stable  Diet is good  No pain-- note chronic lymphedema  Good energy level- enjoys working with projects  Fatigued in heat   No fevers, chills or infectious complaints    Most recent imaging-   - 6/6/2022 PET scan:  FINDINGS:  Quality of the study: Adequate.  In the head and neck, there is nonspecific focal uptake in the right infratemporal fossa without CT correlate which may reflect atypically focal muscular uptake.  There are no other hypermetabolic lesions worrisome for malignancy. There are no hypermetabolic mucosal lesions, and there are no pathologically enlarged or hypermetabolic lymph nodes.  In the chest, there are no hypermetabolic lesions worrisome for malignancy.  No pathologically enlarged or hypermetabolic lymph nodes.  Stable subcentimeter right pulmonary nodules too small to characterize on PET (images 84 and 85). Postsurgical changes of right mastectomy and axillary node dissection.  In the abdomen and pelvis, there is physiologic tracer distribution within the abdominal organs and excretion into the genitourinary system.  Diffuse sigmoid mild uptake, likely related to metformin use or physiologic activity.  There is sigmoid diverticulosis without CT evidence of diverticulitis.  In the bones, there are hypermetabolic lesions at the T12 and T7 vertebral bodies.  Uptake at the T12 vertebral body measures 5.8 SUV max corresponding to a region of prior sclerosis and T7 with an SUV of 4.4 within the body away from focal sclerosis on the right.  Other sclerotic lesions demonstrate uptake similar to normal marrow activity.  In the extremities, there are no hypermetabolic lesions worrisome for malignancy.  Additional CT findings: Bilateral inguinal  hernias.  Large left hydrocele.  Impression:  Interval increased uptake in the T7 body away from sclerosis and T12 body involving a region of prior sclerosis.  Differential considerations for T7 include focal nodular hyperplasia of marrow versus early, CT occult metastasis, and differential considerations for T12 include active osteoblastic metastasis versus healing metastasis.  Other sclerotic lesions demonstrate uptake similar to normal marrow suggesting response to therapy.  Nonspecific focal uptake at the right temporal fossa without CT correlate may indicate atypically focal muscular uptake.  Attention on follow-up     Diagnosis:  Metastatic TNBC progressed (prior Stage IB (S4cV2X4) triple negative invasive ductal carcinoma with lobular features of right breast, retroareolar, Grade 2, Ki67 80%, diagnosed 2019)     Oncology History:  Metastatic  Set up for scans (due to back pain) which are consistent for recurrence.   Imagin/3/2021 MRI T/L spine:  FINDINGS:  Alignment: Within normal limits.  Vertebrae: Low T1 signal intensity in the left T12 vertebral body extending to the left pedicle, S1 and S2 vertebral bodies with abnormal enhancement.  The vertebral heights are well maintained.  No evidence of acute fractures.  Abnormal appearance of the LEFT sacrum and ilium as well.  Discs: Degenerative disease of the level of L4-L5 with posterior annular fissure.  Conus appears within normal limits terminating at the level of the L2. level.  Cauda equina appears unremarkable.  Mild circumferential disc bulging at the level of T10-T11 abutting the ventral thecal sac.  No significant spinal canal stenosis or neural foraminal narrowing throughout the thoracic spine.  No significant spinal canal stenosis or neural foraminal narrowing throughout the lumbar spine.  Paraspinous muscles and soft tissues: Subcentimeter focal enhancement in the posterior column along the supraspinous ligament at the level of L1-L2  (sagittal series 21, image 10) potentially inflammatory versus neoplastic.  Impression:  Abnormal appearance of the T12, S1, S2 vertebral bodies as well as LEFT sacrum and ilium concerning for metastatic disease in this patient with history of breast cancer.  No evidence for significant central canal narrowing.  Additional findings, as above.  This report was flagged in Epic as abnormal.     6/9/2021 PET scan:  COMPARISON:  MRI thoracic and lumbar spine 06/03/2021  FINDINGS:  Quality of the study: Adequate.  In the head and neck, there are no hypermetabolic lesions worrisome for malignancy. There are no hypermetabolic mucosal lesions, and there are no pathologically enlarged or hypermetabolic lymph nodes.  In the chest, there is postoperative change of right mastectomy/right axillary lymph node dissection.  There is low level hypermetabolic activity with mild soft tissue thickening in the skin/superficial subcutaneous fat of the right chest wall (axial fused image 78) with SUV max 3.6.  There is soft tissue thickening measuring approximately 1.8 x 7.9 cm in the subcutaneous fat of the right chest wall (axial series 3, image 84) with SUV max 3.1.  There are a few bilateral pulmonary nodules measuring up to 0.3 cm in the left lung (axial series 3, image 88) and 0.5 cm in the right lung (axial series 3, image 84).  These nodules are too small to characterize by PET.  There are no pathologically enlarged or hypermetabolic lymph nodes.  In the abdomen and pelvis, there is physiologic tracer distribution within the abdominal organs and excretion into the genitourinary system.  Subtle sen mesentery and multiple prominent mesenteric lymph nodes measuring up to 1.8 cm in long axis with background activity.  In the bones, there are several hypermetabolic lesions worrisome for malignancy.  Sclerotic lesion in the left T12 vertebral body (axial series 3, image 131) with SUV max 12.  Sclerotic lesions in the sacrum (for  example axial series 3, image 188) with SUV max 9.7.  Sclerotic lesions in the left iliac bone (for example axial series 3, image 205) with SUV max 6.  In the extremities, there are no hypermetabolic lesions worrisome for malignancy.  Incidental findings:  Bilateral maxillary antra mucous retention cysts.  Fat containing right inguinal hernia.  Impression:  1. In this patient with right breast carcinoma status post mastectomy/right axillary lymph node dissection, there are multiple sclerotic lesions in the T12 vertebral body, sacrum, and left iliac bone with hypermetabolic activity concerning for active metastatic disease and in keeping with findings on MRI 06/03/2021.  2. Additionally there is low-level hypermetabolic activity with soft tissue thickening in the right chest wall as detailed above.  These findings are nonspecific and may be related to postoperative/post radiation change, with recurrent malignancy not excluded.  3. Nonspecific mesenteric haziness and lymph nodes which may indicate mesenteric adenitis.  Recommend clinical correlation and attention on follow-up.  4. Additional findings as above.  I, Sohan Tillman MD, attest that I reviewed and interpreted the images.     In summary,   Started aplelisib 8/22/2021   Abraxane C1 started 8/13/2021  We had sent Guardant and discussed results with Molecular tumor board  He also had a repeat bx to confirm metastatic triple negative breast cancer  Plan:   Nab-Paclitaxel and Alpelisib  Reference: https://ascopubs.org/doi/abs/10.1200/JCO.[email or web address]_suppl.1018  Also on Zometa-      - C1D1 Abraxane 8/13/2021  - Started aplelisib 8/22/2021               Oncology History   Malignant neoplasm involving both nipple and areola of right breast in male, estrogen receptor negative   5/1/2019 Imaging Significant Findings     Mammogram and US  Impression:  Right  Mass: Right breast 14 mm mass at the retroareolar anterior position. Assessment: 4 - Suspicious finding. Biopsy is  recommended.   Left  There is no mammographic or sonographic evidence of malignancy.  Recommendation:  Biopsy is recommended.      5/2/2019 Biopsy     BREAST, RIGHT, RETROAREOLAR MASS, ULTRASOUND-GUIDED BIOPSY:  Invasive ductal carcinoma with lobular features.  Size of invasive carcinoma: 5 MM in greatest linear dimension within a single      5/2/2019 Breast Tumor Markers     Estrogen: Negative  Progesterone: Negative  HER2: Negative  Ki67: 80      5/17/2019 Cancer Staged     Staging form: Breast, AJCC 8th Edition  - Clinical stage from 5/17/2019: Stage IB (cT1c, cN0, cM0, G2, ER-, MO-, HER2-) - Signed by Richa Bahena MD on 5/17/2019 5/27/2019 - 7/21/2019 Chemotherapy     Treatment Summary   Plan Name: OP BREAST DOSE-DENSE AC - DOXORUBICIN CYCLOPHOSPHAMIDE Q2W  Treatment Goal: Curative  Status: Inactive  Start Date: 5/27/2019  End Date: 7/9/2019  Provider: Richa Bahena MD  Chemotherapy: DOXOrubicin chemo injection 186 mg, 60 mg/m2 = 186 mg, Intravenous, Clinic/HOD 1 time, 4 of 4 cycles  Administration: 186 mg (5/27/2019), 186 mg (6/10/2019), 186 mg (6/24/2019), 186 mg (7/8/2019)  cyclophosphamide (CYTOXAN) 600 mg/m2 = 1,865 mg in sodium chloride 0.9% 250 mL chemo infusion, 600 mg/m2 = 1,865 mg, Intravenous, Clinic/HOD 1 time, 4 of 4 cycles  Administration: 1,865 mg (5/27/2019), 1,865 mg (6/10/2019), 1,865 mg (6/24/2019), 1,865 mg (7/8/2019)      7/22/2019 - 10/15/2019 Chemotherapy     Treatment Summary   Plan Name: OP PACLITAXEL QW  Treatment Goal: Curative  Status: Inactive  Start Date: 7/24/2019  End Date: 10/8/2019  Provider: Richa Bahena MD  Chemotherapy: PACLitaxel (TAXOL) 80 mg/m2 = 246 mg in sodium chloride 0.9% 250 mL chemo infusion, 80 mg/m2 = 246 mg, Intravenous, Clinic/HOD 1 time, 12 of 12 cycles  Dose modification: 60 mg/m2 (original dose 80 mg/m2, Cycle 3), 55 mg/m2 (original dose 80 mg/m2, Cycle 7), 60 mg/m2 (original dose 80 mg/m2, Cycle 7), 50 mg/m2 (original dose 80 mg/m2, Cycle  9), 50 mg/m2 (original dose 80 mg/m2, Cycle 10)  Administration: 246 mg (7/24/2019), 246 mg (7/31/2019), 186 mg (8/7/2019), 186 mg (8/14/2019), 186 mg (8/21/2019), 186 mg (8/28/2019), 186 mg (9/4/2019), 174 mg (9/11/2019), 156 mg (9/18/2019), 156 mg (9/25/2019), 156 mg (10/1/2019), 156 mg (10/8/2019)      11/22/2019 Breast Surgery     On 11/22/19 Dr whyte performed a right breast mastectomy with SLN biopsy with pathology showing grade 2, 6 mm IDC with extensive treatment effect, no LVI with 1 LN positive 4 mm, negative margins. The on 12/17/19, Dr Whyte performed right axillary dissection with pathology still pending.      11/22/2019 Cancer Staged     ypT1b N1      12/17/2019 Breast Surgery     Surgery: Dr Shima Whyte, [phone] performed right ALND with pathology showing 14 negative lymph nodes.             1/14/2020 Tumor Conference      Post mastectomy radiation therapy: Xeloda, then possible Keytruda trial .      2/3/2020 - 3/23/2020 Radiation Therapy     Treating physician: Speedy Nair MD   Total Dose: 50.4 Gy to the chest wall and regional lymphatics  10 Gy boost to the prostate.   Fractions: 28 fractions at 1.8 Gy per fraction  5 fractions at 2 Gy per fraction       2/28/2020 -  Chemotherapy     * 4/15/2020 - 9/28/20 completed 6 cycles adjuvant Xeloda 1000 mg/m2 bid days 1-14 every 21 days  Treatment Summary   Plan Name: OP CAPECITABINE 1250MG/M2 BID Q3W  Treatment Goal: Curative  Status: Active  Start Date: 3/20/2020  End Date: 3/20/2020  Provider: Richa Bahena MD  Chemotherapy: [No matching medication found in this treatment plan]          Review of Systems   Constitutional:  Negative for activity change, appetite change, chills, fatigue, fever and unexpected weight change.   HENT:  Negative for dental problem, postnasal drip, rhinorrhea, sinus pressure/congestion, sore throat, trouble swallowing and voice change.    Eyes:  Negative for visual disturbance.   Respiratory:  Negative for  cough, shortness of breath and wheezing.    Cardiovascular:  Negative for chest pain, palpitations and leg swelling.   Gastrointestinal:  Negative for abdominal distention, abdominal pain, blood in stool, change in bowel habit, constipation, diarrhea, nausea, vomiting and change in bowel habit.   Endocrine: Negative for cold intolerance and heat intolerance.   Genitourinary:  Negative for decreased urine volume, difficulty urinating, dysuria, frequency, hematuria and urgency.   Musculoskeletal:  Negative for arthralgias, back pain, gait problem, joint swelling, myalgias, neck pain and neck stiffness.        Lymphedema right arm   Integumentary:  Negative for color change, pallor, rash and wound.        Right arm lymphedema    Neurological:  Positive for numbness (improved neuropathy; right hand fingers). Negative for dizziness, weakness, light-headedness and headaches.   Hematological:  Negative for adenopathy. Does not bruise/bleed easily.   Psychiatric/Behavioral:  Negative for dysphoric mood and sleep disturbance. The patient is not nervous/anxious.        Objective:      Physical Exam  Vitals and nursing note reviewed.   Constitutional:       General: He is not in acute distress.     Appearance: Normal appearance. He is well-developed. He is obese. He is not diaphoretic.      Comments: Presents alone  Very pleasant  ECOG= 0   HENT:      Head: Normocephalic and atraumatic.   Eyes:      General: No scleral icterus.     Extraocular Movements: Extraocular movements intact.      Conjunctiva/sclera: Conjunctivae normal.      Pupils: Pupils are equal, round, and reactive to light.   Neck:      Thyroid: No thyromegaly.      Trachea: No tracheal deviation.   Cardiovascular:      Rate and Rhythm: Normal rate and regular rhythm.      Heart sounds: Normal heart sounds. No murmur heard.    No friction rub. No gallop.   Pulmonary:      Effort: Pulmonary effort is normal. No respiratory distress.      Breath sounds: Normal  breath sounds. No wheezing, rhonchi or rales.   Chest:      Chest wall: No tenderness.   Abdominal:      General: Bowel sounds are normal. There is no distension.      Palpations: Abdomen is soft. There is no mass.      Tenderness: There is no abdominal tenderness. There is no guarding or rebound.      Comments: No organomegaly   Musculoskeletal:         General: Swelling (chronic RUE lymphedema) present. No tenderness or deformity. Normal range of motion.      Cervical back: Normal range of motion and neck supple. No rigidity or tenderness.      Right lower leg: No edema.      Left lower leg: No edema.   Lymphadenopathy:      Cervical: No cervical adenopathy.   Skin:     General: Skin is warm and dry.      Coloration: Skin is not jaundiced or pale.      Findings: No erythema or rash.   Neurological:      General: No focal deficit present.      Mental Status: He is alert and oriented to person, place, and time. Mental status is at baseline.      Cranial Nerves: No cranial nerve deficit.      Sensory: No sensory deficit.      Motor: No weakness or abnormal muscle tone.      Coordination: Coordination normal.      Gait: Gait normal.      Deep Tendon Reflexes: Reflexes are normal and symmetric. Reflexes normal.   Psychiatric:         Mood and Affect: Mood normal.         Behavior: Behavior normal.         Thought Content: Thought content normal.         Judgment: Judgment normal.       Assessment:       Problem List Items Addressed This Visit       Type 2 diabetes mellitus without complication    Malignant neoplasm involving both nipple and areola of right breast in male, estrogen receptor negative - Primary    Lymphedema of right upper extremity    Essential hypertension    Bone metastases    Anemia associated with chemotherapy       Plan:       DM, HTN- fairly well controlled- managed by PCP  Monitor    Lymphedema- PT referral- wears sleeve  We can see if a candidate for a pump at home    Metastatic breast  cancer-  Continue Nab-Paclitaxel and Alpelisib  Also on Zometa  Repeat scans in 10/2022    Route Chart for Scheduling    Med Onc Chart Routing  Urgent    Follow up with physician . 11/3 EP, cbc, cmp and chemo and Zometa same day then needs cbc, cmp and chemo on 11/10 and 11/18   Follow up with JAC . 10/6 EP with PJ cbc, cmp and chemo same day and Zometa, PT referral   Infusion scheduling note 10/13 and 10/20 needs cbc, cmp and chemo only   Injection scheduling note    Labs    Imaging    Pharmacy appointment    Other referrals Additional referrals needed         Treatment Plan Information   OP BREAST NAB-PACLITAXEL D1, D8, D15 + ALPELISIB    Rosa Linn MD   Upcoming Treatment Dates - OP BREAST NAB-PACLITAXEL D1, D8, D15 + ALPELISIB     9/19/2022       Chemotherapy       PACLitaxeL protein-bound (ABRAXANE) 80 mg/m2 = 240 mg in 48 mL infusion  10/3/2022       Chemotherapy       PACLitaxeL protein-bound (ABRAXANE) 80 mg/m2 = 240 mg in 48 mL infusion  10/10/2022       Chemotherapy       PACLitaxeL protein-bound (ABRAXANE) 80 mg/m2 = 240 mg in 48 mL infusion  10/17/2022       Chemotherapy       PACLitaxeL protein-bound (ABRAXANE) 80 mg/m2 = 240 mg in 48 mL infusion    Supportive Plan Information  OP FILGRASTIM 480 MCG   Michelle Grayson NP   Upcoming Treatment Dates - OP FILGRASTIM 480 MCG    No upcoming days in selected categories.    Therapy Plan Information  PORT FLUSH  Flushes  heparin, porcine (PF) 100 unit/mL injection flush 500 Units  500 Units, Intravenous, Every visit  sodium chloride 0.9% flush 10 mL  10 mL, Intravenous, Every visit    ZOLEDRONIC ACID (ZOMETA) IV  Medications  zoledronic 4 mg/100 mL infusion 4 mg  4 mg, Intravenous, Every 4 weeks  Flushes  sodium chloride 0.9% 250 mL flush bag  Intravenous, Every 4 weeks  sodium chloride 0.9% flush 10 mL  10 mL, Intravenous, Every 4 weeks  heparin, porcine (PF) 100 unit/mL injection flush 500 Units  500 Units, Intravenous, Every 4 weeks  alteplase  injection 2 mg  2 mg, Intra-Catheter, Every 4 weeks

## 2022-09-18 DIAGNOSIS — E11.65 UNCONTROLLED TYPE 2 DIABETES MELLITUS WITH HYPERGLYCEMIA: ICD-10-CM

## 2022-09-19 ENCOUNTER — INFUSION (OUTPATIENT)
Dept: INFUSION THERAPY | Facility: HOSPITAL | Age: 47
End: 2022-09-19
Attending: INTERNAL MEDICINE
Payer: MEDICARE

## 2022-09-19 VITALS
SYSTOLIC BLOOD PRESSURE: 144 MMHG | WEIGHT: 315 LBS | RESPIRATION RATE: 18 BRPM | TEMPERATURE: 98 F | BODY MASS INDEX: 40.43 KG/M2 | HEIGHT: 74 IN | HEART RATE: 62 BPM | DIASTOLIC BLOOD PRESSURE: 86 MMHG

## 2022-09-19 DIAGNOSIS — C79.51 BONE METASTASES: Primary | ICD-10-CM

## 2022-09-19 DIAGNOSIS — C50.021 MALIGNANT NEOPLASM INVOLVING BOTH NIPPLE AND AREOLA OF RIGHT BREAST IN MALE, ESTROGEN RECEPTOR NEGATIVE: ICD-10-CM

## 2022-09-19 DIAGNOSIS — Z17.1 MALIGNANT NEOPLASM INVOLVING BOTH NIPPLE AND AREOLA OF RIGHT BREAST IN MALE, ESTROGEN RECEPTOR NEGATIVE: ICD-10-CM

## 2022-09-19 PROCEDURE — A4216 STERILE WATER/SALINE, 10 ML: HCPCS | Performed by: INTERNAL MEDICINE

## 2022-09-19 PROCEDURE — 96361 HYDRATE IV INFUSION ADD-ON: CPT

## 2022-09-19 PROCEDURE — 25000003 PHARM REV CODE 250: Performed by: INTERNAL MEDICINE

## 2022-09-19 PROCEDURE — 96413 CHEMO IV INFUSION 1 HR: CPT

## 2022-09-19 PROCEDURE — 96365 THER/PROPH/DIAG IV INF INIT: CPT

## 2022-09-19 PROCEDURE — 63600175 PHARM REV CODE 636 W HCPCS: Performed by: INTERNAL MEDICINE

## 2022-09-19 PROCEDURE — 96375 TX/PRO/DX INJ NEW DRUG ADDON: CPT

## 2022-09-19 RX ORDER — METFORMIN HYDROCHLORIDE 500 MG/1
1000 TABLET ORAL 2 TIMES DAILY WITH MEALS
Qty: 120 TABLET | Refills: 2 | Status: SHIPPED | OUTPATIENT
Start: 2022-09-19 | End: 2022-12-08 | Stop reason: SDUPTHER

## 2022-09-19 RX ORDER — HEPARIN 100 UNIT/ML
500 SYRINGE INTRAVENOUS
Status: DISCONTINUED | OUTPATIENT
Start: 2022-09-19 | End: 2022-09-19 | Stop reason: HOSPADM

## 2022-09-19 RX ORDER — ZOLEDRONIC ACID 0.04 MG/ML
4 INJECTION, SOLUTION INTRAVENOUS
Status: COMPLETED | OUTPATIENT
Start: 2022-09-19 | End: 2022-09-19

## 2022-09-19 RX ORDER — ZOLEDRONIC ACID 0.04 MG/ML
4 INJECTION, SOLUTION INTRAVENOUS
Status: CANCELLED | OUTPATIENT
Start: 2022-10-10

## 2022-09-19 RX ORDER — SODIUM CHLORIDE 9 MG/ML
INJECTION, SOLUTION INTRAVENOUS CONTINUOUS
Status: DISCONTINUED | OUTPATIENT
Start: 2022-09-19 | End: 2022-09-19 | Stop reason: HOSPADM

## 2022-09-19 RX ORDER — SODIUM CHLORIDE 0.9 % (FLUSH) 0.9 %
10 SYRINGE (ML) INJECTION
Status: DISCONTINUED | OUTPATIENT
Start: 2022-09-19 | End: 2022-09-19 | Stop reason: HOSPADM

## 2022-09-19 RX ORDER — SODIUM CHLORIDE 0.9 % (FLUSH) 0.9 %
10 SYRINGE (ML) INJECTION
Status: CANCELLED | OUTPATIENT
Start: 2022-10-10

## 2022-09-19 RX ORDER — HEPARIN 100 UNIT/ML
500 SYRINGE INTRAVENOUS
Status: CANCELLED | OUTPATIENT
Start: 2022-10-10

## 2022-09-19 RX ADMIN — SODIUM CHLORIDE: 0.9 INJECTION, SOLUTION INTRAVENOUS at 10:09

## 2022-09-19 RX ADMIN — PACLITAXEL 220 MG: 100 INJECTION, POWDER, LYOPHILIZED, FOR SUSPENSION INTRAVENOUS at 12:09

## 2022-09-19 RX ADMIN — HEPARIN SODIUM (PORCINE) LOCK FLUSH IV SOLN 100 UNIT/ML 500 UNITS: 100 SOLUTION at 01:09

## 2022-09-19 RX ADMIN — ZOLEDRONIC ACID 4 MG: 0.04 INJECTION, SOLUTION INTRAVENOUS at 11:09

## 2022-09-19 RX ADMIN — Medication 10 ML: at 01:09

## 2022-09-20 ENCOUNTER — PES CALL (OUTPATIENT)
Dept: ADMINISTRATIVE | Facility: CLINIC | Age: 47
End: 2022-09-20
Payer: MEDICARE

## 2022-09-21 ENCOUNTER — TELEPHONE (OUTPATIENT)
Dept: ADMINISTRATIVE | Facility: CLINIC | Age: 47
End: 2022-09-21
Payer: MEDICARE

## 2022-09-21 ENCOUNTER — PATIENT MESSAGE (OUTPATIENT)
Dept: ADMINISTRATIVE | Facility: CLINIC | Age: 47
End: 2022-09-21
Payer: MEDICARE

## 2022-09-21 ENCOUNTER — PES CALL (OUTPATIENT)
Dept: ADMINISTRATIVE | Facility: CLINIC | Age: 47
End: 2022-09-21
Payer: MEDICARE

## 2022-09-21 ENCOUNTER — PATIENT MESSAGE (OUTPATIENT)
Dept: OTHER | Facility: OTHER | Age: 47
End: 2022-09-21
Payer: MEDICARE

## 2022-09-21 NOTE — TELEPHONE ENCOUNTER
Called pt; informed pt I was just making a reminder call for his virtual visit today at 8:30am and to see if he needed any help; pt stated he didn't need any help and would complete his e-pre check in a few moments; pt informed to login 15 minutes prior to appt time; message sent through portal

## 2022-09-29 ENCOUNTER — PATIENT MESSAGE (OUTPATIENT)
Dept: PALLIATIVE MEDICINE | Facility: CLINIC | Age: 47
End: 2022-09-29
Payer: MEDICARE

## 2022-09-30 ENCOUNTER — OFFICE VISIT (OUTPATIENT)
Dept: PALLIATIVE MEDICINE | Facility: CLINIC | Age: 47
End: 2022-09-30
Payer: MEDICARE

## 2022-09-30 DIAGNOSIS — F43.23 ADJUSTMENT DISORDER WITH MIXED ANXIETY AND DEPRESSED MOOD: ICD-10-CM

## 2022-09-30 DIAGNOSIS — Z71.89 ADVANCED CARE PLANNING/COUNSELING DISCUSSION: ICD-10-CM

## 2022-09-30 DIAGNOSIS — Z17.1 MALIGNANT NEOPLASM INVOLVING BOTH NIPPLE AND AREOLA OF RIGHT BREAST IN MALE, ESTROGEN RECEPTOR NEGATIVE: Primary | ICD-10-CM

## 2022-09-30 DIAGNOSIS — Z51.5 ENCOUNTER FOR PALLIATIVE CARE: ICD-10-CM

## 2022-09-30 DIAGNOSIS — C50.021 MALIGNANT NEOPLASM INVOLVING BOTH NIPPLE AND AREOLA OF RIGHT BREAST IN MALE, ESTROGEN RECEPTOR NEGATIVE: Primary | ICD-10-CM

## 2022-09-30 PROCEDURE — 4010F PR ACE/ARB THEARPY RXD/TAKEN: ICD-10-PCS | Mod: CPTII,95,, | Performed by: STUDENT IN AN ORGANIZED HEALTH CARE EDUCATION/TRAINING PROGRAM

## 2022-09-30 PROCEDURE — 3044F HG A1C LEVEL LT 7.0%: CPT | Mod: CPTII,95,, | Performed by: STUDENT IN AN ORGANIZED HEALTH CARE EDUCATION/TRAINING PROGRAM

## 2022-09-30 PROCEDURE — 1160F RVW MEDS BY RX/DR IN RCRD: CPT | Mod: CPTII,95,, | Performed by: STUDENT IN AN ORGANIZED HEALTH CARE EDUCATION/TRAINING PROGRAM

## 2022-09-30 PROCEDURE — 99443 PR PHYSICIAN TELEPHONE EVALUATION 21-30 MIN: CPT | Mod: 95,,, | Performed by: STUDENT IN AN ORGANIZED HEALTH CARE EDUCATION/TRAINING PROGRAM

## 2022-09-30 PROCEDURE — 1159F PR MEDICATION LIST DOCUMENTED IN MEDICAL RECORD: ICD-10-PCS | Mod: CPTII,95,, | Performed by: STUDENT IN AN ORGANIZED HEALTH CARE EDUCATION/TRAINING PROGRAM

## 2022-09-30 PROCEDURE — 1160F PR REVIEW ALL MEDS BY PRESCRIBER/CLIN PHARMACIST DOCUMENTED: ICD-10-PCS | Mod: CPTII,95,, | Performed by: STUDENT IN AN ORGANIZED HEALTH CARE EDUCATION/TRAINING PROGRAM

## 2022-09-30 PROCEDURE — 4010F ACE/ARB THERAPY RXD/TAKEN: CPT | Mod: CPTII,95,, | Performed by: STUDENT IN AN ORGANIZED HEALTH CARE EDUCATION/TRAINING PROGRAM

## 2022-09-30 PROCEDURE — 99443 PR PHYSICIAN TELEPHONE EVALUATION 21-30 MIN: ICD-10-PCS | Mod: 95,,, | Performed by: STUDENT IN AN ORGANIZED HEALTH CARE EDUCATION/TRAINING PROGRAM

## 2022-09-30 PROCEDURE — 1159F MED LIST DOCD IN RCRD: CPT | Mod: CPTII,95,, | Performed by: STUDENT IN AN ORGANIZED HEALTH CARE EDUCATION/TRAINING PROGRAM

## 2022-09-30 PROCEDURE — 3044F PR MOST RECENT HEMOGLOBIN A1C LEVEL <7.0%: ICD-10-PCS | Mod: CPTII,95,, | Performed by: STUDENT IN AN ORGANIZED HEALTH CARE EDUCATION/TRAINING PROGRAM
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
## 2022-10-03 ENCOUNTER — PATIENT MESSAGE (OUTPATIENT)
Dept: ADMINISTRATIVE | Facility: CLINIC | Age: 47
End: 2022-10-03
Payer: MEDICARE

## 2022-10-03 ENCOUNTER — TELEPHONE (OUTPATIENT)
Dept: ADMINISTRATIVE | Facility: CLINIC | Age: 47
End: 2022-10-03
Payer: MEDICARE

## 2022-10-03 NOTE — TELEPHONE ENCOUNTER
Called pt; informed pt I was calling to confirm his virtual EAWV on 10/5/22 at 1:00pm and to see if he needed any help; pt stated he did not need any help and would complete e-pre check later today; pt informed to login 15 minutes prior to appt time; sent message through portal

## 2022-10-04 ENCOUNTER — DOCUMENTATION ONLY (OUTPATIENT)
Dept: HEMATOLOGY/ONCOLOGY | Facility: CLINIC | Age: 47
End: 2022-10-04
Payer: MEDICARE

## 2022-10-04 ENCOUNTER — SPECIALTY PHARMACY (OUTPATIENT)
Dept: PHARMACY | Facility: CLINIC | Age: 47
End: 2022-10-04
Payer: MEDICARE

## 2022-10-04 NOTE — PROGRESS NOTES
Pt called Sw requesting assistance finding a new primary care doc and a new uro provider. Pt is familiar with My Chart and uses it regularly.  SW walked pt thorough how to search for and schedule appropriate docs.  Pt verbalized understanding and SW remains available.

## 2022-10-04 NOTE — TELEPHONE ENCOUNTER
Specialty Pharmacy - Refill Coordination    Specialty Medication Orders Linked to Encounter      Flowsheet Row Most Recent Value   Medication #1 alpelisib 300 mg/day (150 mg x 2) Tab (Order#[id number], Rx#[id number])            Refill Questions - Documented Responses      Flowsheet Row Most Recent Value   Patient Availability and HIPAA Verification    Does patient want to proceed with activity? Yes   HIPAA/medical authority confirmed? Yes   Relationship to patient of person spoken to? Self   Refill Screening Questions    Changes to allergies? No   Changes to medications? No   New conditions since last clinic visit? No   Unplanned office visit, urgent care, ED, or hospital admission in the last 4 weeks? No   How does patient/caregiver feel medication is working? Very good   Financial problems or insurance changes? No   How many doses of your specialty medications were missed in the last 4 weeks? 0   Would patient like to speak to a pharmacist? No   When does the patient need to receive the medication? 10/11/22   Refill Delivery Questions    How will the patient receive the medication? Pickup   When does the patient need to receive the medication? 10/11/22   Shipping Address Home   Address in [facility] confirmed and updated if neccessary? Yes   Expected Copay ($) 0   Is the patient able to afford the medication copay? Yes   Payment Method zero copay   Days supply of Refill 28   Supplies needed? No supplies needed   Refill activity completed? Yes   Refill activity plan Refill scheduled   Shipment/Pickup Date: 10/05/22            Current Outpatient Medications   Medication Sig    alpelisib 300 mg/day (150 mg x 2) Tab Take 2 tablets (300 mg) by mouth once daily.    ALPRAZolam (XANAX) 0.5 MG tablet Take 1 tablet (0.5 mg total) by mouth 3 (three) times daily as needed for Insomnia or Anxiety.    amLODIPine (NORVASC) 10 MG tablet TAKE 1 TABLET (10 MG) BY MOUTH EVERY DAY    calcium-vitamin D3 (OYSTER SHELL  "CALCIUM-VIT D3) 500 mg-5 mcg (200 unit) per tablet Take 1 tablet by mouth 2 (two) times daily.    diphenoxylate-atropine 2.5-0.025 mg (LOMOTIL) 2.5-0.025 mg per tablet Take 1 tablet by mouth 4 (four) times daily as needed for Diarrhea.    dulaglutide (TRULICITY) 1.5 mg/0.5 mL pen injector Inject 1.5 mg into the skin once a week.    FLUoxetine 20 MG capsule Take 2 capsules (40 mg total) by mouth once daily.    gabapentin (NEURONTIN) 300 MG capsule Take 1 capsule (300 mg total) by mouth 3 (three) times daily.    hydroCHLOROthiazide (HYDRODIURIL) 25 MG tablet TAKE 1 TABLET BY MOUTH ONCE DAILY    HYDROcodone-acetaminophen (NORCO)  mg per tablet Take 1 tablet by mouth every 6 (six) hours as needed for Pain.    insulin detemir U-100 (LEVEMIR FLEXTOUCH U-100 INSULN) 100 unit/mL (3 mL) InPn pen Inject 21 Units into the skin every evening.    insulin lispro (HUMALOG KWIKPEN INSULIN) 100 unit/mL pen Inject 5 Units into the skin 3 (three) times daily.    lancets 33 gauge Misc 1 lancet by Misc.(Non-Drug; Combo Route) route once daily.    lancing device Misc 1 Device by Misc.(Non-Drug; Combo Route) route 2 (two) times daily with meals.    LIDOcaine-prilocaine (EMLA) cream Apply topically as needed.    losartan (COZAAR) 50 MG tablet Take 2 tablets by mouth once daily    metFORMIN (GLUCOPHAGE) 500 MG tablet Take 2 tablets (1,000 mg total) by mouth 2 (two) times daily with meals. Needs appointment for future refills    pen needle, diabetic (BD ULTRA-FINE TANESHA PEN NEEDLE) 32 gauge x 5/32" Ndle Use as directed with novolog and levemir    tadalafiL (CIALIS) 5 MG tablet Take 2 tablets (10 mg total) by mouth daily as needed for Erectile Dysfunction.   Last reviewed on 9/30/2022  1:50 PM by Angeles Rodriguez MD    Review of patient's allergies indicates:  No Known Allergies Last reviewed on  9/30/2022 1:50 PM by Angeles Nodurft      Tasks added this encounter   11/1/2022 - Refill Call (Auto Added)  10/6/2022 - Pickup Reminder "   Tasks due within next 3 months   10/12/2022 - Clinical - Follow Up Assesement (180 day)     Júnior Montano, PharmD  [facility]  [address]  [facility] [address]  Phone: [phone]  Fax: [phone]

## 2022-10-05 ENCOUNTER — OFFICE VISIT (OUTPATIENT)
Dept: PSYCHIATRY | Facility: CLINIC | Age: 47
End: 2022-10-05
Payer: COMMERCIAL

## 2022-10-05 ENCOUNTER — TELEPHONE (OUTPATIENT)
Dept: ADMINISTRATIVE | Facility: CLINIC | Age: 47
End: 2022-10-05
Payer: MEDICARE

## 2022-10-05 DIAGNOSIS — F43.23 ADJUSTMENT DISORDER WITH MIXED ANXIETY AND DEPRESSED MOOD: Primary | ICD-10-CM

## 2022-10-05 PROCEDURE — 3044F HG A1C LEVEL LT 7.0%: CPT | Mod: CPTII,S$GLB,, | Performed by: PSYCHOLOGIST

## 2022-10-05 PROCEDURE — 90834 PSYTX W PT 45 MINUTES: CPT | Mod: S$GLB,,, | Performed by: PSYCHOLOGIST

## 2022-10-05 PROCEDURE — 4010F PR ACE/ARB THEARPY RXD/TAKEN: ICD-10-PCS | Mod: CPTII,S$GLB,, | Performed by: PSYCHOLOGIST

## 2022-10-05 PROCEDURE — 1159F MED LIST DOCD IN RCRD: CPT | Mod: CPTII,S$GLB,, | Performed by: PSYCHOLOGIST

## 2022-10-05 PROCEDURE — 99999 PR PBB SHADOW E&M-EST. PATIENT-LVL II: CPT | Mod: PBBFAC,,, | Performed by: PSYCHOLOGIST

## 2022-10-05 PROCEDURE — 1159F PR MEDICATION LIST DOCUMENTED IN MEDICAL RECORD: ICD-10-PCS | Mod: CPTII,S$GLB,, | Performed by: PSYCHOLOGIST

## 2022-10-05 PROCEDURE — 99999 PR PBB SHADOW E&M-EST. PATIENT-LVL II: ICD-10-PCS | Mod: PBBFAC,,, | Performed by: PSYCHOLOGIST

## 2022-10-05 PROCEDURE — 4010F ACE/ARB THERAPY RXD/TAKEN: CPT | Mod: CPTII,S$GLB,, | Performed by: PSYCHOLOGIST

## 2022-10-05 PROCEDURE — 3044F PR MOST RECENT HEMOGLOBIN A1C LEVEL <7.0%: ICD-10-PCS | Mod: CPTII,S$GLB,, | Performed by: PSYCHOLOGIST

## 2022-10-05 PROCEDURE — 1160F PR REVIEW ALL MEDS BY PRESCRIBER/CLIN PHARMACIST DOCUMENTED: ICD-10-PCS | Mod: CPTII,S$GLB,, | Performed by: PSYCHOLOGIST

## 2022-10-05 PROCEDURE — 1160F RVW MEDS BY RX/DR IN RCRD: CPT | Mod: CPTII,S$GLB,, | Performed by: PSYCHOLOGIST

## 2022-10-05 PROCEDURE — 90834 PR PSYCHOTHERAPY W/PATIENT, 45 MIN: ICD-10-PCS | Mod: S$GLB,,, | Performed by: PSYCHOLOGIST
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
## 2022-10-05 NOTE — PROGRESS NOTES
History:     Reason For Follow Up:   1. Stage IB ([id number]) triple negative invasive ductal carcinoma with lobular features of right breast, retroareolar, Grade 2, Ki67 80%, diagnosed 5/2019    HPI:   Paul Li Jr. presents for follow up of breast cancer.   Current undergoing radiation therapy.  He is working with Physical therapy for lymphedema. Still struggling with depressed mood, but improving. No skin changes from RT. + fatigue and insomnia. Neuropathy improving.    Onc History:     Malignant neoplasm involving both nipple and areola of right breast in male, estrogen receptor negative    5/1/2019 Imaging Significant Findings     Mammogram and US  Impression:  Right  Mass: Right breast 14 mm mass at the retroareolar anterior position. Assessment: 4 - Suspicious finding. Biopsy is recommended.      Left  There is no mammographic or sonographic evidence of malignancy.          Recommendation:  Biopsy is recommended.      5/2/2019 Biopsy     BREAST, RIGHT, RETROAREOLAR MASS, ULTRASOUND-GUIDED BIOPSY:  Invasive ductal carcinoma with lobular features.  Size of invasive carcinoma: 5 MM in greatest linear dimension within a single      5/2/2019 Breast Tumor Markers     Estrogen: Negative  Progesterone: Negative  HER2: Negative  Ki67: 80      5/2/2019 Cancer Staged     Staging form: Breast, AJCC 8th Edition  - Clinical stage from 5/17/2019: Stage IB (cT1c, cN0, cM0, G2, ER-, MD-, HER2-) - Signed by Richa Bahena MD on 5/17/2019 5/27/2019 - 7/21/2019 Chemotherapy     Treatment Summary   Plan Name: OP BREAST DOSE-DENSE AC - DOXORUBICIN CYCLOPHOSPHAMIDE Q2W  Treatment Goal: Curative  Status: Inactive  Start Date: 5/27/2019  End Date: 7/9/2019  Provider: Richa Bahena MD  Chemotherapy: DOXOrubicin chemo injection 186 mg, 60 mg/m2 = 186 mg, Intravenous, Clinic/HOD 1 time, 4 of 4 cycles  Administration: 186 mg (5/27/2019), 186 mg (6/10/2019), 186 mg (6/24/2019), 186 mg (7/8/2019)  cyclophosphamide (CYTOXAN)  600 mg/m2 = 1,865 mg in sodium chloride 0.9% 250 mL chemo infusion, 600 mg/m2 = 1,865 mg, Intravenous, Clinic/HOD 1 time, 4 of 4 cycles  Administration: 1,865 mg (5/27/2019), 1,865 mg (6/10/2019), 1,865 mg (6/24/2019), 1,865 mg (7/8/2019)      7/22/2019 - 10/15/2019 Chemotherapy     Treatment Summary   Plan Name: OP PACLITAXEL QW  Treatment Goal: Curative  Status: Inactive  Start Date: 7/24/2019  End Date: 10/8/2019  Provider: Richa Bahena MD  Chemotherapy: PACLitaxel (TAXOL) 80 mg/m2 = 246 mg in sodium chloride 0.9% 250 mL chemo infusion, 80 mg/m2 = 246 mg, Intravenous, Clinic/HOD 1 time, 12 of 12 cycles  Dose modification: 60 mg/m2 (original dose 80 mg/m2, Cycle 3), 55 mg/m2 (original dose 80 mg/m2, Cycle 7), 60 mg/m2 (original dose 80 mg/m2, Cycle 7), 50 mg/m2 (original dose 80 mg/m2, Cycle 9), 50 mg/m2 (original dose 80 mg/m2, Cycle 10)  Administration: 246 mg (7/24/2019), 246 mg (7/31/2019), 186 mg (8/7/2019), 186 mg (8/14/2019), 186 mg (8/21/2019), 186 mg (8/28/2019), 186 mg (9/4/2019), 174 mg (9/11/2019), 156 mg (9/18/2019), 156 mg (9/25/2019), 156 mg (10/1/2019), 156 mg (10/8/2019)      11/22/2019 Breast Surgery     On 11/22/19 Dr whyte performed a right breast mastectomy with SLN biopsy with pathology showing grade 2, 6 mm IDC with extensive treatment effect, no LVI with 1 LN positive 4 mm, negative margins. The on 12/17/19, Dr Whyte performed right axillary dissection with pathology still pending.      11/22/2019 Cancer Staged     ypT1b N1      12/17/2019 Breast Surgery     Surgery: Dr hSima Whyte, [phone] performed right ALND with pathology showing 14 negative lymph nodes.           1/14/2020 Tumor Conference        Post mastectomy radiation therapy: Xeloda, then possible Keytruda trial .      2/28/2020 -  Chemotherapy     Treatment Summary   Plan Name: OP CAPECITABINE 1250MG/M2 BID Q3W  Treatment Goal: Curative  Status: Active  Start Date: 2/28/2020 (Planned)  End Date: 2/28/2020  (Planned)  Provider: Richa Bahena MD  Chemotherapy: [No matching medication found in this treatment plan]           Medications    Current Outpatient Medications:     amLODIPine (NORVASC) 10 MG tablet, TAKE 1 TABLET (10 MG) BY MOUTH EVERY DAY (Patient taking differently: Take 10 mg by mouth every morning. ), Disp: 30 tablet, Rfl: 10    atorvastatin (LIPITOR) 20 MG tablet, Take 1 tablet (20 mg total) by mouth once daily., Disp: 90 tablet, Rfl: 3    blood sugar diagnostic Strp, To check BG 4 times daily, to use with insurance preferred meter, Disp: 400 each, Rfl: 3    blood-glucose meter kit, Use as instructed, Disp: 1 each, Rfl: 0    dulaglutide (TRULICITY) 1.5 mg/0.5 mL PnIj, Inject 1.5 mg into the skin once a week. Trulicity 0.75mg weekly for 4 weeks & then increase to 1.5 mg weekly indefinitely. (Patient not taking: Reported on 1/20/2020), Disp: 6 mL, Rfl: 3    empagliflozin (JARDIANCE) 10 mg Tab, Take 10 mg by mouth once daily for 14 days, THEN 25 mg once daily., Disp: 30 tablet, Rfl: 3    empagliflozin (JARDIANCE) 25 mg Tab, Take 25 mg by mouth once daily. Take 10 mg daily for 14 days then increase to 25 mg thereafter, Disp: 30 tablet, Rfl: 3    escitalopram oxalate (LEXAPRO) 10 MG tablet, Take 1 tablet (10 mg total) by mouth once daily., Disp: 30 tablet, Rfl: 11    flash glucose scanning reader (FREESTYLE MARIANO 14 DAY READER) Misc, Check glucose continuousl;y, Disp: 1 each, Rfl: 0    flash glucose sensor (FREESTYLE MARIANO 14 DAY SENSOR) Kit, Check glucose continously, Disp: 1 kit, Rfl: 11    gabapentin (NEURONTIN) 300 MG capsule, Take 1 capsule (300 mg total) by mouth 3 (three) times daily., Disp: 90 capsule, Rfl: 11    hydroCHLOROthiazide (HYDRODIURIL) 25 MG tablet, TAKE 1 TABLET BY MOUTH ONCE DAILY, Disp: 90 tablet, Rfl: 3    insulin aspart U-100 (NOVOLOG) 100 unit/mL (3 mL) InPn pen, Inject 24 Units into the skin 3 (three) times daily. (Patient taking differently: Inject 16 Units into the  "skin 3 (three) times daily. ), Disp: 64.8 mL, Rfl: 3    insulin detemir U-100 (LEVEMIR FLEXTOUCH) 100 unit/mL (3 mL) SubQ InPn pen, Inject 40 Units into the skin 2 (two) times daily. (Patient taking differently: Inject 36 Units into the skin nightly. ), Disp: 24 mL, Rfl: 11    lancets 33 gauge Misc, Use 2 (two) times daily with meals., Disp: 100 each, Rfl: 11    lancets 33 gauge Misc, To check BG 4 times daily, to use with insurance preferred meter, Disp: 200 each, Rfl: 11    lancing device Misc, 1 Device by Misc.(Non-Drug; Combo Route) route 2 (two) times daily with meals., Disp: 1 each, Rfl: 0    lidocaine-prilocaine (EMLA) cream, Apply topically to affected area 45 minutes before port access, Disp: 30 g, Rfl: 1    losartan (COZAAR) 100 MG tablet, TAKE 1 TABLET BY MOUTH ONCE DAILY, Disp: 90 tablet, Rfl: 3    metFORMIN (GLUCOPHAGE-XR) 500 MG 24 hr tablet, Take 2 tablets (1,000 mg total) by mouth daily with breakfast. (Patient taking differently: Take 1,000 mg by mouth 2 (two) times daily with meals. ), Disp: 180 tablet, Rfl: 3    ondansetron (ZOFRAN-ODT) 8 MG TbDL, Take 1 tablet (8 mg total) by mouth every 12 (twelve) hours as needed. (Patient not taking: Reported on 1/20/2020), Disp: 20 tablet, Rfl: 1    oxyCODONE-acetaminophen (PERCOCET) 5-325 mg per tablet, Take 1 tablet by mouth every 4 (four) hours as needed for Pain., Disp: 40 tablet, Rfl: 0    oxyCODONE-acetaminophen (PERCOCET) 5-325 mg per tablet, Take 1 tablet by mouth every 6 (six) hours as needed., Disp: 10 tablet, Rfl: 0    pen needle, diabetic (BD ULTRA-FINE TANESHA PEN NEEDLE) 32 gauge x 5/32" Ndle, Use as directed with novolog and levemir, Disp: 100 each, Rfl: 5    senna (SENOKOT) 8.6 mg tablet, Take 1 tablet by mouth once daily., Disp: , Rfl:     tadalafil (CIALIS) 5 MG tablet, Take 2 tablets (10 mg total) by mouth daily as needed for Erectile Dysfunction., Disp: 20 tablet, Rfl: 1  No current facility-administered medications for this " "visit.     Facility-Administered Medications Ordered in Other Visits:     heparin, porcine (PF) 100 unit/mL injection flush 500 Units, 500 Units, Intravenous, 1 time in Clinic/HOD, Richa Bahena MD    sodium chloride 0.9% flush 10 mL, 10 mL, Intravenous, 1 time in Clinic/HOD, Richa Bahena MD  Allergies  Review of patient's allergies indicates:  No Known Allergies  Review of Systems  Review of Systems   Constitutional: Positive for fatigue. Negative for activity change, appetite change, chills, fever and unexpected weight change.   HENT: Negative for congestion, hearing loss, nosebleeds, sinus pressure, sinus pain and trouble swallowing.    Eyes: Negative for pain, discharge, itching and visual disturbance.   Respiratory: Negative for cough, chest tightness and shortness of breath.    Cardiovascular: Negative for chest pain, palpitations and leg swelling.   Gastrointestinal: Negative for abdominal distention, abdominal pain, blood in stool, constipation, diarrhea, nausea, rectal pain and vomiting.   Endocrine: Negative for heat intolerance and polydipsia.   Genitourinary: Negative for difficulty urinating, dysuria, flank pain, frequency, hematuria and urgency.   Musculoskeletal: Negative for arthralgias, back pain and neck pain.   Skin: Negative for color change, pallor and rash.        Healing incision   Neurological: Positive for numbness. Negative for dizziness and headaches.   Hematological: Negative for adenopathy. Does not bruise/bleed easily.   Psychiatric/Behavioral: Positive for dysphoric mood. Negative for confusion, decreased concentration and sleep disturbance. The patient is not nervous/anxious.         Objective     Objective:     Vitals:    02/14/20 1450   BP: 131/61   BP Location: Left arm   Patient Position: Sitting   BP Method: Medium (Automatic)   Pulse: 68   Resp: 18   Temp: 98.2 °F (36.8 °C)   TempSrc: Oral   SpO2: 96%   Weight: (!) 182.9 kg (403 lb 3.5 oz)   Height: 6' 2" (1.88 m) "   Body mass index is 51.77 kg/m².    Body surface area is 3.09 meters squared.  Physical Exam   Constitutional: He is oriented to person, place, and time. He appears well-developed and well-nourished. No distress.   HENT:   Head: Normocephalic and atraumatic.   Mouth/Throat: Oropharynx is clear and moist and mucous membranes are normal. No oral lesions.   Eyes: Conjunctivae and EOM are normal. Right eye exhibits no discharge. Left eye exhibits no discharge. No scleral icterus.   Cardiovascular: Normal rate, regular rhythm, S1 normal, S2 normal and normal heart sounds.   Pulmonary/Chest: Effort normal and breath sounds normal. No respiratory distress. He has no wheezes. He has no rhonchi. He has no rales.   Right mastectomy  Left mediport - unchanged   Abdominal: Soft. Normal appearance and bowel sounds are normal. There is no tenderness.   Musculoskeletal: Normal range of motion. He exhibits edema (right arm swelling greater than left). He exhibits no deformity.   Neurological: He is alert and oriented to person, place, and time. He has normal strength.   Skin: Skin is warm, dry and intact. No pallor.   Psychiatric: His behavior is normal. Thought content normal.       Labs and Imaging  Results for orders placed or performed in visit on 02/14/20   CBC Oncology   Result Value Ref Range    WBC 2.97 (L) 3.90 - 12.70 K/uL    RBC 5.05 4.60 - 6.20 M/uL    Hemoglobin 11.7 (L) 14.0 - 18.0 g/dL    Hematocrit 38.8 (L) 40.0 - 54.0 %    Mean Corpuscular Volume 77 (L) 82 - 98 fL    Mean Corpuscular Hemoglobin 23.2 (L) 27.0 - 31.0 pg    Mean Corpuscular Hemoglobin Conc 30.2 (L) 32.0 - 36.0 g/dL    RDW 16.2 (H) 11.5 - 14.5 %    Platelets 188 150 - 350 K/uL    MPV 9.8 9.2 - 12.9 fL    Gran # (ANC) 1.7 (L) 1.8 - 7.7 K/uL    Immature Grans (Abs) 0.01 0.00 - 0.04 K/uL   Comprehensive metabolic panel   Result Value Ref Range    Sodium 137 136 - 145 mmol/L    Potassium 4.0 3.5 - 5.1 mmol/L    Chloride 104 95 - 110 mmol/L    CO2 26 23 -  29 mmol/L    Glucose 94 70 - 110 mg/dL    BUN, Bld 9 6 - 20 mg/dL    Creatinine 1.2 0.5 - 1.4 mg/dL    Calcium 9.1 8.7 - 10.5 mg/dL    Total Protein 7.8 6.0 - 8.4 g/dL    Albumin 3.9 3.5 - 5.2 g/dL    Total Bilirubin 0.6 0.1 - 1.0 mg/dL    Alkaline Phosphatase 89 55 - 135 U/L    AST 14 10 - 40 U/L    ALT 17 10 - 44 U/L    Anion Gap 7 (L) 8 - 16 mmol/L    eGFR if African American >60.0 >60 mL/min/1.73 m^2    eGFR if non African American >60.0 >60 mL/min/1.73 m^2       Assessment     Assessment:   1. Stage IB (K2aQ7P4) invasive ductal carcinoma with lobular features of right breast, retroareolar, ER neg, NC neg, Her2 neg, Grade 2, Ki67 80%, diagnosed 5/2019   * Genetics negative   * 5/27/19 - 10/8/19 completed neoadjuvant ddAC followed by weekly Taxol.    * On 11/22/19 Dr whyte performed a right breast mastectomy with SLN biopsy with pathology showing grade 2, 6 mm IDC with extensive treatment effect, no LVI with 1 LN positive 4 mm, negative margins. The on 12/17/19, Dr Whyte performed right axillary dissection with pathology still pending.   * Ongoing RT and then Xeloda. Also will see if candidate for  after Xeloda.     2. Microcytic anemia   * Has anemia as baseline but worsened with chemotherapy   * improving.    3. Adjustment disorder/depression.    * Dr Nunez; On effexor as of late 12/2019    4. Drug induced constipation   * Continue senna S.     5. HTN and Diabetes per PCP; last hemoglobin A1c     6. Insomnia   * Tried melatonin and Restoril without success    7. Chemo neuropathy   * Gabapentin 300 mg tid.     8. Lymphedema   * Working with PT  Plan:     Radiation therapy  Plan Xeloda after RT is complete. Will return to consent and send in Xeloda in 5 weeks with plans to start once he returns from vacation in early April.    eval after Xeloda  Continue gabapentin 300 mg tid.   Diabetes management  Lymphedema clinic.   Follow up in 5 weeks with cbc, cmp, port flush.     Future Appointments   Date  10-Oct-2022 Time Provider Department Center   2/21/2020  2:00 PM Heidy Segal RD, CDE [facility] DIABETE [address]   2/27/2020 10:00 AM Kareem Arroyo MD [facility]   3/19/2020  1:00 PM LAB, HEMONC SAME DAY [facility] [place]   3/20/2020  7:00 AM Richa Bahena MD [facility] HEM ONC [address]   3/31/2020 10:00 AM LAB, APPOINTMENT [facility]   4/7/2020  2:00 PM Octavia Van, LAUREEN [facility] ENDODIA [address]   6/15/2020 11:15 AM Sihma Whyte MD [facility] [address]   7/6/2020 10:00 AM Shima Whyte MD [facility] MOSES [address]

## 2022-10-06 ENCOUNTER — OFFICE VISIT (OUTPATIENT)
Dept: HEMATOLOGY/ONCOLOGY | Facility: CLINIC | Age: 47
End: 2022-10-06
Payer: MEDICARE

## 2022-10-06 ENCOUNTER — INFUSION (OUTPATIENT)
Dept: INFUSION THERAPY | Facility: HOSPITAL | Age: 47
End: 2022-10-06
Attending: INTERNAL MEDICINE
Payer: MEDICARE

## 2022-10-06 VITALS
WEIGHT: 315 LBS | TEMPERATURE: 98 F | OXYGEN SATURATION: 99 % | RESPIRATION RATE: 19 BRPM | BODY MASS INDEX: 40.43 KG/M2 | HEIGHT: 74 IN | DIASTOLIC BLOOD PRESSURE: 62 MMHG | HEART RATE: 70 BPM | SYSTOLIC BLOOD PRESSURE: 126 MMHG

## 2022-10-06 VITALS
SYSTOLIC BLOOD PRESSURE: 121 MMHG | HEART RATE: 56 BPM | DIASTOLIC BLOOD PRESSURE: 66 MMHG | TEMPERATURE: 98 F | OXYGEN SATURATION: 99 % | RESPIRATION RATE: 18 BRPM

## 2022-10-06 DIAGNOSIS — T45.1X5A ANEMIA ASSOCIATED WITH CHEMOTHERAPY: ICD-10-CM

## 2022-10-06 DIAGNOSIS — C79.51 BONE METASTASES: ICD-10-CM

## 2022-10-06 DIAGNOSIS — T45.1X5A CHEMOTHERAPY-INDUCED NEUTROPENIA: ICD-10-CM

## 2022-10-06 DIAGNOSIS — D64.81 ANEMIA ASSOCIATED WITH CHEMOTHERAPY: ICD-10-CM

## 2022-10-06 DIAGNOSIS — C50.021 MALIGNANT NEOPLASM INVOLVING BOTH NIPPLE AND AREOLA OF RIGHT BREAST IN MALE, ESTROGEN RECEPTOR NEGATIVE: Primary | ICD-10-CM

## 2022-10-06 DIAGNOSIS — F43.23 ADJUSTMENT DISORDER WITH MIXED ANXIETY AND DEPRESSED MOOD: ICD-10-CM

## 2022-10-06 DIAGNOSIS — Z17.1 MALIGNANT NEOPLASM INVOLVING BOTH NIPPLE AND AREOLA OF RIGHT BREAST IN MALE, ESTROGEN RECEPTOR NEGATIVE: Primary | ICD-10-CM

## 2022-10-06 DIAGNOSIS — N43.3 HYDROCELE IN ADULT: ICD-10-CM

## 2022-10-06 DIAGNOSIS — I89.0 LYMPHEDEMA OF RIGHT UPPER EXTREMITY: ICD-10-CM

## 2022-10-06 DIAGNOSIS — D70.1 CHEMOTHERAPY-INDUCED NEUTROPENIA: ICD-10-CM

## 2022-10-06 DIAGNOSIS — E11.9 TYPE 2 DIABETES MELLITUS WITHOUT COMPLICATION, WITHOUT LONG-TERM CURRENT USE OF INSULIN: ICD-10-CM

## 2022-10-06 PROCEDURE — 4010F PR ACE/ARB THEARPY RXD/TAKEN: ICD-10-PCS | Mod: CPTII,S$GLB,, | Performed by: NURSE PRACTITIONER

## 2022-10-06 PROCEDURE — 99999 PR PBB SHADOW E&M-EST. PATIENT-LVL V: CPT | Mod: PBBFAC,,, | Performed by: NURSE PRACTITIONER

## 2022-10-06 PROCEDURE — 3008F PR BODY MASS INDEX (BMI) DOCUMENTED: ICD-10-PCS | Mod: CPTII,S$GLB,, | Performed by: NURSE PRACTITIONER

## 2022-10-06 PROCEDURE — 99999 PR PBB SHADOW E&M-EST. PATIENT-LVL V: ICD-10-PCS | Mod: PBBFAC,,, | Performed by: NURSE PRACTITIONER

## 2022-10-06 PROCEDURE — 96360 HYDRATION IV INFUSION INIT: CPT

## 2022-10-06 PROCEDURE — 4010F ACE/ARB THERAPY RXD/TAKEN: CPT | Mod: CPTII,S$GLB,, | Performed by: NURSE PRACTITIONER

## 2022-10-06 PROCEDURE — 3044F HG A1C LEVEL LT 7.0%: CPT | Mod: CPTII,S$GLB,, | Performed by: NURSE PRACTITIONER

## 2022-10-06 PROCEDURE — 1159F PR MEDICATION LIST DOCUMENTED IN MEDICAL RECORD: ICD-10-PCS | Mod: CPTII,S$GLB,, | Performed by: NURSE PRACTITIONER

## 2022-10-06 PROCEDURE — 3008F BODY MASS INDEX DOCD: CPT | Mod: CPTII,S$GLB,, | Performed by: NURSE PRACTITIONER

## 2022-10-06 PROCEDURE — 3074F SYST BP LT 130 MM HG: CPT | Mod: CPTII,S$GLB,, | Performed by: NURSE PRACTITIONER

## 2022-10-06 PROCEDURE — 3074F PR MOST RECENT SYSTOLIC BLOOD PRESSURE < 130 MM HG: ICD-10-PCS | Mod: CPTII,S$GLB,, | Performed by: NURSE PRACTITIONER

## 2022-10-06 PROCEDURE — 63600175 PHARM REV CODE 636 W HCPCS: Mod: JG | Performed by: NURSE PRACTITIONER

## 2022-10-06 PROCEDURE — 3078F PR MOST RECENT DIASTOLIC BLOOD PRESSURE < 80 MM HG: ICD-10-PCS | Mod: CPTII,S$GLB,, | Performed by: NURSE PRACTITIONER

## 2022-10-06 PROCEDURE — 96413 CHEMO IV INFUSION 1 HR: CPT

## 2022-10-06 PROCEDURE — 25000003 PHARM REV CODE 250: Performed by: NURSE PRACTITIONER

## 2022-10-06 PROCEDURE — 3078F DIAST BP <80 MM HG: CPT | Mod: CPTII,S$GLB,, | Performed by: NURSE PRACTITIONER

## 2022-10-06 PROCEDURE — 1159F MED LIST DOCD IN RCRD: CPT | Mod: CPTII,S$GLB,, | Performed by: NURSE PRACTITIONER

## 2022-10-06 PROCEDURE — 99215 PR OFFICE/OUTPT VISIT, EST, LEVL V, 40-54 MIN: ICD-10-PCS | Mod: S$GLB,,, | Performed by: NURSE PRACTITIONER

## 2022-10-06 PROCEDURE — 99215 OFFICE O/P EST HI 40 MIN: CPT | Mod: S$GLB,,, | Performed by: NURSE PRACTITIONER

## 2022-10-06 PROCEDURE — 3044F PR MOST RECENT HEMOGLOBIN A1C LEVEL <7.0%: ICD-10-PCS | Mod: CPTII,S$GLB,, | Performed by: NURSE PRACTITIONER

## 2022-10-06 PROCEDURE — A4216 STERILE WATER/SALINE, 10 ML: HCPCS | Performed by: NURSE PRACTITIONER

## 2022-10-06 RX ORDER — SODIUM CHLORIDE 9 MG/ML
INJECTION, SOLUTION INTRAVENOUS CONTINUOUS
Status: CANCELLED | OUTPATIENT
Start: 2022-10-06

## 2022-10-06 RX ORDER — SODIUM CHLORIDE 9 MG/ML
INJECTION, SOLUTION INTRAVENOUS CONTINUOUS
Status: DISCONTINUED | OUTPATIENT
Start: 2022-10-06 | End: 2022-10-06 | Stop reason: HOSPADM

## 2022-10-06 RX ORDER — HEPARIN 100 UNIT/ML
500 SYRINGE INTRAVENOUS
Status: DISCONTINUED | OUTPATIENT
Start: 2022-10-06 | End: 2022-10-06 | Stop reason: HOSPADM

## 2022-10-06 RX ORDER — SODIUM CHLORIDE 0.9 % (FLUSH) 0.9 %
10 SYRINGE (ML) INJECTION
Status: CANCELLED | OUTPATIENT
Start: 2022-10-06

## 2022-10-06 RX ORDER — SODIUM CHLORIDE 0.9 % (FLUSH) 0.9 %
10 SYRINGE (ML) INJECTION
Status: DISCONTINUED | OUTPATIENT
Start: 2022-10-06 | End: 2022-10-06 | Stop reason: HOSPADM

## 2022-10-06 RX ORDER — HEPARIN 100 UNIT/ML
500 SYRINGE INTRAVENOUS
Status: CANCELLED | OUTPATIENT
Start: 2022-10-06

## 2022-10-06 RX ADMIN — SODIUM CHLORIDE: 0.9 INJECTION, SOLUTION INTRAVENOUS at 09:10

## 2022-10-06 RX ADMIN — HEPARIN 500 UNITS: 100 SYRINGE at 11:10

## 2022-10-06 RX ADMIN — Medication 10 ML: at 11:10

## 2022-10-06 RX ADMIN — PACLITAXEL 220 MG: 100 INJECTION, POWDER, LYOPHILIZED, FOR SUSPENSION INTRAVENOUS at 10:10

## 2022-10-06 NOTE — PROGRESS NOTES
Subjective:       Patient ID: Paul Li Jr. is a 46 y.o. male.    Chief Complaint: Malignant neoplasm involving both nipple and areola of righ    HPI    Returns for follow up for chemotherapy C14D1 (Abraxane and PO Aplelisib)  In the interval, depression noted.   He is seeing Dr. Sandoval and working through.   Reports today is a better day.   He feels tension relief when he is away from home.   Thinking about going to [place] and staying with son for awhile. Able to get to [place] for treatments.   Otherwise doing well and stays active.   No pain issues.   No fever/chills.   Reports Weight stable  Diet is good  chronic lymphedema  Occasional diarrhea depending on what he eats.   Wants to see urology for hydrocele as this makes walking uncomfortable.     Most recent imaging-   - 6/6/2022 PET scan:  FINDINGS:  Quality of the study: Adequate.  In the head and neck, there is nonspecific focal uptake in the right infratemporal fossa without CT correlate which may reflect atypically focal muscular uptake.  There are no other hypermetabolic lesions worrisome for malignancy. There are no hypermetabolic mucosal lesions, and there are no pathologically enlarged or hypermetabolic lymph nodes.  In the chest, there are no hypermetabolic lesions worrisome for malignancy.  No pathologically enlarged or hypermetabolic lymph nodes.  Stable subcentimeter right pulmonary nodules too small to characterize on PET (images 84 and 85). Postsurgical changes of right mastectomy and axillary node dissection.  In the abdomen and pelvis, there is physiologic tracer distribution within the abdominal organs and excretion into the genitourinary system.  Diffuse sigmoid mild uptake, likely related to metformin use or physiologic activity.  There is sigmoid diverticulosis without CT evidence of diverticulitis.  In the bones, there are hypermetabolic lesions at the T12 and T7 vertebral bodies.  Uptake at the T12 vertebral body measures 5.8 SUV max  corresponding to a region of prior sclerosis and T7 with an SUV of 4.4 within the body away from focal sclerosis on the right.  Other sclerotic lesions demonstrate uptake similar to normal marrow activity.  In the extremities, there are no hypermetabolic lesions worrisome for malignancy.  Additional CT findings: Bilateral inguinal hernias.  Large left hydrocele.  Impression:  Interval increased uptake in the T7 body away from sclerosis and T12 body involving a region of prior sclerosis.  Differential considerations for T7 include focal nodular hyperplasia of marrow versus early, CT occult metastasis, and differential considerations for T12 include active osteoblastic metastasis versus healing metastasis.  Other sclerotic lesions demonstrate uptake similar to normal marrow suggesting response to therapy.  Nonspecific focal uptake at the right temporal fossa without CT correlate may indicate atypically focal muscular uptake.  Attention on follow-up     Diagnosis:  Metastatic TNBC progressed (prior Stage IB (C2pE8D6) triple negative invasive ductal carcinoma with lobular features of right breast, retroareolar, Grade 2, Ki67 80%, diagnosed 2019)     Oncology History:  Metastatic  Set up for scans (due to back pain) which are consistent for recurrence.   Imagin/3/2021 MRI T/L spine:  FINDINGS:  Alignment: Within normal limits.  Vertebrae: Low T1 signal intensity in the left T12 vertebral body extending to the left pedicle, S1 and S2 vertebral bodies with abnormal enhancement.  The vertebral heights are well maintained.  No evidence of acute fractures.  Abnormal appearance of the LEFT sacrum and ilium as well.  Discs: Degenerative disease of the level of L4-L5 with posterior annular fissure.  Conus appears within normal limits terminating at the level of the L2. level.  Cauda equina appears unremarkable.  Mild circumferential disc bulging at the level of T10-T11 abutting the ventral thecal sac.  No significant  spinal canal stenosis or neural foraminal narrowing throughout the thoracic spine.  No significant spinal canal stenosis or neural foraminal narrowing throughout the lumbar spine.  Paraspinous muscles and soft tissues: Subcentimeter focal enhancement in the posterior column along the supraspinous ligament at the level of L1-L2 (sagittal series 21, image 10) potentially inflammatory versus neoplastic.  Impression:  Abnormal appearance of the T12, S1, S2 vertebral bodies as well as LEFT sacrum and ilium concerning for metastatic disease in this patient with history of breast cancer.  No evidence for significant central canal narrowing.  Additional findings, as above.  This report was flagged in Epic as abnormal.     6/9/2021 PET scan:  COMPARISON:  MRI thoracic and lumbar spine 06/03/2021  FINDINGS:  Quality of the study: Adequate.  In the head and neck, there are no hypermetabolic lesions worrisome for malignancy. There are no hypermetabolic mucosal lesions, and there are no pathologically enlarged or hypermetabolic lymph nodes.  In the chest, there is postoperative change of right mastectomy/right axillary lymph node dissection.  There is low level hypermetabolic activity with mild soft tissue thickening in the skin/superficial subcutaneous fat of the right chest wall (axial fused image 78) with SUV max 3.6.  There is soft tissue thickening measuring approximately 1.8 x 7.9 cm in the subcutaneous fat of the right chest wall (axial series 3, image 84) with SUV max 3.1.  There are a few bilateral pulmonary nodules measuring up to 0.3 cm in the left lung (axial series 3, image 88) and 0.5 cm in the right lung (axial series 3, image 84).  These nodules are too small to characterize by PET.  There are no pathologically enlarged or hypermetabolic lymph nodes.  In the abdomen and pelvis, there is physiologic tracer distribution within the abdominal organs and excretion into the genitourinary system.  Subtle sen mesentery  and multiple prominent mesenteric lymph nodes measuring up to 1.8 cm in long axis with background activity.  In the bones, there are several hypermetabolic lesions worrisome for malignancy.  Sclerotic lesion in the left T12 vertebral body (axial series 3, image 131) with SUV max 12.  Sclerotic lesions in the sacrum (for example axial series 3, image 188) with SUV max 9.7.  Sclerotic lesions in the left iliac bone (for example axial series 3, image 205) with SUV max 6.  In the extremities, there are no hypermetabolic lesions worrisome for malignancy.  Incidental findings:  Bilateral maxillary antra mucous retention cysts.  Fat containing right inguinal hernia.  Impression:  1. In this patient with right breast carcinoma status post mastectomy/right axillary lymph node dissection, there are multiple sclerotic lesions in the T12 vertebral body, sacrum, and left iliac bone with hypermetabolic activity concerning for active metastatic disease and in keeping with findings on MRI 06/03/2021.  2. Additionally there is low-level hypermetabolic activity with soft tissue thickening in the right chest wall as detailed above.  These findings are nonspecific and may be related to postoperative/post radiation change, with recurrent malignancy not excluded.  3. Nonspecific mesenteric haziness and lymph nodes which may indicate mesenteric adenitis.  Recommend clinical correlation and attention on follow-up.  4. Additional findings as above.  I, Sohan Tillman MD, attest that I reviewed and interpreted the images.     In summary,   Started aplelisib 8/22/2021   Abraxane C1 started 8/13/2021  We had sent Guardant and discussed results with Molecular tumor board  He also had a repeat bx to confirm metastatic triple negative breast cancer  Plan:   Nab-Paclitaxel and Alpelisib  Reference: https://ascopubs.org/doi/abs/10.1200/JCO.[email or web address]_suppl.1018  Also on Zometa-      - C1D1 Abraxane 8/13/2021  - Started aplelisib 8/22/2021                Oncology History   Malignant neoplasm involving both nipple and areola of right breast in male, estrogen receptor negative   5/1/2019 Imaging Significant Findings     Mammogram and US  Impression:  Right  Mass: Right breast 14 mm mass at the retroareolar anterior position. Assessment: 4 - Suspicious finding. Biopsy is recommended.   Left  There is no mammographic or sonographic evidence of malignancy.  Recommendation:  Biopsy is recommended.      5/2/2019 Biopsy     BREAST, RIGHT, RETROAREOLAR MASS, ULTRASOUND-GUIDED BIOPSY:  Invasive ductal carcinoma with lobular features.  Size of invasive carcinoma: 5 MM in greatest linear dimension within a single      5/2/2019 Breast Tumor Markers     Estrogen: Negative  Progesterone: Negative  HER2: Negative  Ki67: 80      5/17/2019 Cancer Staged     Staging form: Breast, AJCC 8th Edition  - Clinical stage from 5/17/2019: Stage IB (cT1c, cN0, cM0, G2, ER-, IA-, HER2-) - Signed by Richa Bahena MD on 5/17/2019 5/27/2019 - 7/21/2019 Chemotherapy     Treatment Summary   Plan Name: OP BREAST DOSE-DENSE AC - DOXORUBICIN CYCLOPHOSPHAMIDE Q2W  Treatment Goal: Curative  Status: Inactive  Start Date: 5/27/2019  End Date: 7/9/2019  Provider: Richa Bahena MD  Chemotherapy: DOXOrubicin chemo injection 186 mg, 60 mg/m2 = 186 mg, Intravenous, Clinic/[facility] 1 time, 4 of 4 cycles  Administration: 186 mg (5/27/2019), 186 mg (6/10/2019), 186 mg (6/24/2019), 186 mg (7/8/2019)  cyclophosphamide (CYTOXAN) 600 mg/m2 = 1,865 mg in sodium chloride 0.9% 250 mL chemo infusion, 600 mg/m2 = 1,865 mg, Intravenous, Clinic/[facility] 1 time, 4 of 4 cycles  Administration: 1,865 mg (5/27/2019), 1,865 mg (6/10/2019), 1,865 mg (6/24/2019), 1,865 mg (7/8/2019)      7/22/2019 - 10/15/2019 Chemotherapy     Treatment Summary   Plan Name: OP PACLITAXEL QW  Treatment Goal: Curative  Status: Inactive  Start Date: 7/24/2019  End Date: 10/8/2019  Provider: Richa Bahena MD  Chemotherapy: PACLitaxel (TAXOL) 80  mg/m2 = 246 mg in sodium chloride 0.9% 250 mL chemo infusion, 80 mg/m2 = 246 mg, Intravenous, [facility] [place] 1 time, 12 of 12 cycles  Dose modification: 60 mg/m2 (original dose 80 mg/m2, Cycle 3), 55 mg/m2 (original dose 80 mg/m2, Cycle 7), 60 mg/m2 (original dose 80 mg/m2, Cycle 7), 50 mg/m2 (original dose 80 mg/m2, Cycle 9), 50 mg/m2 (original dose 80 mg/m2, Cycle 10)  Administration: 246 mg (7/24/2019), 246 mg (7/31/2019), 186 mg (8/7/2019), 186 mg (8/14/2019), 186 mg (8/21/2019), 186 mg (8/28/2019), 186 mg (9/4/2019), 174 mg (9/11/2019), 156 mg (9/18/2019), 156 mg (9/25/2019), 156 mg (10/1/2019), 156 mg (10/8/2019)      11/22/2019 Breast Surgery     On 11/22/19 Dr whyte performed a right breast mastectomy with SLN biopsy with pathology showing grade 2, 6 mm IDC with extensive treatment effect, no LVI with 1 LN positive 4 mm, negative margins. The on 12/17/19, Dr Whyte performed right axillary dissection with pathology still pending.      11/22/2019 Cancer Staged     ypT1b N1      12/17/2019 Breast Surgery     Surgery: Dr Shima Whyte, [phone] performed right ALND with pathology showing 14 negative lymph nodes.             1/14/2020 Tumor Conference      Post mastectomy radiation therapy: Xeloda, then possible Keytruda trial .      2/3/2020 - 3/23/2020 Radiation Therapy     Treating physician: Speedy Nair MD   Total Dose: 50.4 Gy to the chest wall and regional lymphatics  10 Gy boost to the prostate.   Fractions: 28 fractions at 1.8 Gy per fraction  5 fractions at 2 Gy per fraction       2/28/2020 -  Chemotherapy     * 4/15/2020 - 9/28/20 completed 6 cycles adjuvant Xeloda 1000 mg/m2 bid days 1-14 every 21 days  Treatment Summary   Plan Name: OP CAPECITABINE 1250MG/M2 BID Q3W  Treatment Goal: Curative  Status: Active  Start Date: 3/20/2020  End Date: 3/20/2020  Provider: Richa Bahena MD  Chemotherapy: [No matching medication found in this treatment plan]          Review of Systems   Constitutional:   Negative for activity change, appetite change, chills, fatigue, fever and unexpected weight change.   HENT:  Negative for dental problem, postnasal drip, rhinorrhea, sinus pressure/congestion, sore throat, trouble swallowing and voice change.    Eyes:  Negative for visual disturbance.   Respiratory:  Negative for cough, shortness of breath and wheezing.    Cardiovascular:  Negative for chest pain, palpitations and leg swelling.   Gastrointestinal:  Negative for abdominal distention, abdominal pain, blood in stool, change in bowel habit, constipation, diarrhea, nausea, vomiting and change in bowel habit.   Endocrine: Negative for cold intolerance and heat intolerance.   Genitourinary:  Negative for decreased urine volume, difficulty urinating, dysuria, frequency, hematuria and urgency.   Musculoskeletal:  Negative for arthralgias, back pain, gait problem, joint swelling, myalgias, neck pain and neck stiffness.        Lymphedema right arm   Integumentary:  Negative for color change, pallor, rash and wound.        Right arm lymphedema    Neurological:  Positive for numbness (improved neuropathy; right hand fingers). Negative for dizziness, weakness, light-headedness and headaches.   Hematological:  Negative for adenopathy. Does not bruise/bleed easily.   Psychiatric/Behavioral:  Positive for dysphoric mood. Negative for sleep disturbance. The patient is not nervous/anxious.        Objective:      Physical Exam  Vitals and nursing note reviewed.   Constitutional:       General: He is not in acute distress.     Appearance: Normal appearance. He is well-developed. He is not diaphoretic.      Comments: Presents alone  Very pleasant  ECOG= 0   HENT:      Head: Normocephalic and atraumatic.   Eyes:      General: No scleral icterus.     Extraocular Movements: Extraocular movements intact.      Conjunctiva/sclera: Conjunctivae normal.      Pupils: Pupils are equal, round, and reactive to light.   Neck:      Thyroid: No  thyromegaly.      Trachea: No tracheal deviation.   Cardiovascular:      Rate and Rhythm: Normal rate and regular rhythm.      Heart sounds: Normal heart sounds. No murmur heard.    No friction rub. No gallop.   Pulmonary:      Effort: Pulmonary effort is normal. No respiratory distress.      Breath sounds: Normal breath sounds. No wheezing, rhonchi or rales.      Comments: Right mastectomy. No LAD  Chest:      Chest wall: No tenderness.   Abdominal:      General: Bowel sounds are normal. There is no distension.      Palpations: Abdomen is soft. There is no mass.      Tenderness: There is no abdominal tenderness. There is no guarding or rebound.      Comments: No organomegaly   Musculoskeletal:         General: Swelling (chronic RUE lymphedema) present. No tenderness or deformity. Normal range of motion.      Cervical back: Normal range of motion and neck supple. No rigidity or tenderness.      Right lower leg: No edema.      Left lower leg: No edema.   Lymphadenopathy:      Cervical: No cervical adenopathy.   Skin:     General: Skin is warm and dry.      Coloration: Skin is not jaundiced or pale.      Findings: No erythema or rash.   Neurological:      General: No focal deficit present.      Mental Status: He is alert and oriented to person, place, and time. Mental status is at baseline.      Sensory: No sensory deficit.      Motor: No weakness or abnormal muscle tone.      Coordination: Coordination normal.      Gait: Gait normal.   Psychiatric:         Behavior: Behavior normal.         Thought Content: Thought content normal.         Judgment: Judgment normal.      Comments: Mild dysphoria but overall good spirits       Assessment:       Problem List Items Addressed This Visit          Psychiatric    Adjustment disorder with mixed anxiety and depressed mood       Oncology    Malignant neoplasm involving both nipple and areola of right breast in male, estrogen receptor negative - Primary    Chemotherapy-induced  neutropenia    Bone metastases    Anemia associated with chemotherapy       Endocrine    Type 2 diabetes mellitus without complication       Other    Lymphedema of right upper extremity       Plan:         Metastatic breast cancer-  Labs reviewed.   Continue Nab-Paclitaxel and Alpelisib  Also on Zometa - due 10/16/2022  Repeat scans in 10/2022    Honest discussion regarding Dx and that he as a non-curative breast cancer.   He had a mutation that allowed for a unique targeted therapy for which he has done very well - there is no way to determine how long and that he is living with cancer but it will never go away.   Currently his scans are good and clinically he is doing well.   All questions answered.   Continue follow up with Dr. Sandoval.         DM, HTN- fairly well controlled- managed by PCP  Monitor    Lymphedema- PT referral placed previously - wears sleeve    Anemia-  Monitor     Chemo in neutropenia-  Monitor   Precautions.     Hydrocele  Refer to urology  Addendum: He is cleared for repair if needed. Will need to coordinate holding treatment.   Route Chart for Scheduling    Med Onc Chart Routing  Urgent    Follow up with physician . - as previous but needs PET before this appt  - 11/3 EP, cbc, cmp and chemo same day then needs cbc, cmp and chemo on 11/10 and 11/18   Follow up with [facility] . PT referral   Infusion scheduling note 10/13 and 10/20 needs cbc, cmp and chemo only; zometa due 10/20   Injection scheduling note    Labs    Imaging PET scan   PET prior to appt with Dr. Linn (after 10/20 chemo)   Pharmacy appointment    Other referrals Additional referrals needed       Treatment Plan Information   OP BREAST NAB-PACLITAXEL D1, D8, D15 + ALPELISIB    Rosa Linn MD   Upcoming Treatment Dates - OP BREAST NAB-PACLITAXEL D1, D8, D15 + ALPELISIB     10/10/2022       Chemotherapy       PACLitaxeL protein-bound (ABRAXANE) 80 mg/m2 = 220 mg in 44 mL infusion  10/17/2022       Chemotherapy       PACLitaxeL  protein-bound (ABRAXANE) 80 mg/m2 = 220 mg in 44 mL infusion    Supportive Plan Information  OP FILGRASTIM 480 MCG   Michelle Grayson, LAUREEN   Upcoming Treatment Dates - OP FILGRASTIM 480 MCG    No upcoming days in selected categories.    Therapy Plan Information  PORT FLUSH  Flushes  heparin, porcine (PF) 100 unit/mL injection flush 500 Units  500 Units, Intravenous, Every visit  sodium chloride 0.9% flush 10 mL  10 mL, Intravenous, Every visit    ZOLEDRONIC ACID (ZOMETA) IV  Medications  zoledronic 4 mg/100 mL infusion 4 mg  4 mg, Intravenous, Every 4 weeks  Flushes  sodium chloride 0.9% 250 mL flush bag  Intravenous, Every 4 weeks  sodium chloride 0.9% flush 10 mL  10 mL, Intravenous, Every 4 weeks  heparin, porcine (PF) 100 unit/mL injection flush 500 Units  500 Units, Intravenous, Every 4 weeks  alteplase injection 2 mg  2 mg, Intra-Catheter, Every 4 weeks          Patient is in agreement with the proposed treatment plan. All questions were answered to the patient's satisfaction. Pt knows to call clinic for any new or worsening symptoms and if anything is needed before the next clinic visit.      OZIEL Jefferson-C  Hematology & Oncology  [facility] [place] [address]  ph. [phone]  Fax. [phone]     Face to Face time with patient: 35minutes  45minutes of total time spent on the encounter, which includes face to face time and non-face to face time preparing to see the patient (eg, review of tests), Obtaining and/or reviewing separately obtained history, Documenting clinical information in the electronic or other health record, Independently interpreting results (not separately reported) and communicating results to the patient/family/caregiver, or Care coordination (not separately reported).

## 2022-10-06 NOTE — PLAN OF CARE
1110- Pt tolerated Abraxane infusion and IVF's well, no complications or side effects, POC and meds discussed with pt, pt aware of upcoming appts, pt knows to call MD with any questions or concerns. Pt ambulated off unit, no distress noted.

## 2022-10-10 ENCOUNTER — CLINICAL SUPPORT (OUTPATIENT)
Dept: REHABILITATION | Facility: HOSPITAL | Age: 47
End: 2022-10-10
Attending: INTERNAL MEDICINE
Payer: MEDICARE

## 2022-10-10 DIAGNOSIS — R29.3 POOR POSTURE: ICD-10-CM

## 2022-10-10 DIAGNOSIS — I89.0 LYMPHEDEMA OF RIGHT UPPER EXTREMITY: ICD-10-CM

## 2022-10-10 DIAGNOSIS — C50.021 MALIGNANT NEOPLASM INVOLVING BOTH NIPPLE AND AREOLA OF RIGHT BREAST IN MALE, ESTROGEN RECEPTOR NEGATIVE: ICD-10-CM

## 2022-10-10 DIAGNOSIS — I89.0 LYMPHEDEMA OF RIGHT ARM: Primary | ICD-10-CM

## 2022-10-10 DIAGNOSIS — Z74.09 IMPAIRED FUNCTIONAL MOBILITY AND ACTIVITY TOLERANCE: ICD-10-CM

## 2022-10-10 DIAGNOSIS — Z17.1 MALIGNANT NEOPLASM INVOLVING BOTH NIPPLE AND AREOLA OF RIGHT BREAST IN MALE, ESTROGEN RECEPTOR NEGATIVE: ICD-10-CM

## 2022-10-10 PROCEDURE — 97162 PT EVAL MOD COMPLEX 30 MIN: CPT

## 2022-10-10 PROCEDURE — 97110 THERAPEUTIC EXERCISES: CPT

## 2022-10-10 PROCEDURE — 97140 MANUAL THERAPY 1/> REGIONS: CPT

## 2022-10-10 NOTE — PLAN OF CARE
[facility]  Physical Therapy Initial Evaluation    Date: 10/10/2022   Name: Paul Li Jr.  Clinic Number: [id number]    Therapy Diagnosis:   Encounter Diagnoses   Name Primary?    Malignant neoplasm involving both nipple and areola of right breast in male, estrogen receptor negative     Lymphedema of right upper extremity     Lymphedema of right arm Yes    Impaired functional mobility and activity tolerance     Poor posture      Physician: Rosa Linn MD    Physician Orders: PT Eval and Treat -RUE lymphedema  Medical Diagnosis: Malignant neoplasm involving both nipple and areola of right breast in male, estrogen receptor negative [C50.021, Z17.1], Lymphedema of right upper extremity   Evaluation Date: 10/10/2022  Authorization Period Expiration: 10/14/22  Plan of Care Certification Period: 1/6/23 (12 weeks)  Visit # / Visits authorized: 1/ 1  Insurance: [employer]  FOTO: 1/3    Time In: 4:05 PM  Time Out: 5:17 PM  Total Billable Time: 72 minutes    Precautions: Standard, Fall, cancer, and Spine, Bony Mets, hx of Seizures, L chest wall port      History   History of Present Illness: Paul is a 46 y.o. male that presents to  [facility] at the [facility] with history of right breast surgery.  On 12/17/19 pt underwent right axillary lymph node dissection and resection excess lateral tissue; 11/22/19 right simple mastectomy with SLNB; total of 18 lymph nodes removed  Diagnosis: Malignant neoplasm involving both nipple and areola of right breast in male, estrogen receptor negative [C50.021, Z17.1], Lymphedema of right upper extremity [I89.0]  Chief complaint: pt states he has been having swelling in his right arm since surgery, and it fluctuates. Pt states he has been using tubigrip for compression. Pt had a prescription for a compression sleeve last time he had lymph therapy, but he wasn't able to get it because he was  120 lbs heavier and couldn't find a sleeve that fit him.  Pt states he does lymphatic drainage 2x/week, but he used to do it daily.  Surgical History:  Paul Li Jr.  has a past surgical history that includes Insertion of tunneled central venous catheter (CVC) with subcutaneous port (Left, 5/24/2019); Simple mastectomy (Right, 11/22/2019); [place] lymph node biopsy (Right, 11/22/2019); Axillary node dissection (Right, 11/22/2019); Axillary node dissection (Right, 12/17/2019); Breast biopsy; and Insertion of tunneled central venous catheter (CVC) with subcutaneous port (Left, 9/17/2021).    Chemotherapy: te-adjuvant chemo therapy completed 10/19  Pt is currently on chemotherapy: pt is getting 1 infusion/week for 3 weeks with 1 week break, and he takes oral chemotherapy daily.  Radiation: Completed in February 2020    Past Medical History:   Diagnosis Date    Arthritis     Breast cancer     Cancer     R breast    Diabetes mellitus     HTN (hypertension)     Leg edema, right     Prediabetes     Seizures     at age 18 - stress related    Therapy     marital counseling          Medications:  Paul has a current medication list which includes the following prescription(s): alpelisib, alprazolam, amlodipine, calcium-vitamin d3, diphenoxylate-atropine 2.5-0.025 mg, dulaglutide, fluoxetine, gabapentin, hydrochlorothiazide, hydrocodone-acetaminophen, levemir flextouch u-100 insuln, insulin lispro, lancets, lancing device, lidocaine-prilocaine, losartan, metformin, pen needle, diabetic, tadalafil, [DISCONTINUED] insulin aspart u-100, and [DISCONTINUED] losartan-hydrochlorothiazide 100-25 mg, and the following Facility-Administered Medications: sodium chloride 0.9%, alteplase, heparin, porcine (pf), heparin, porcine (pf), heparin, porcine (pf), sodium chloride 0.9% 250 mL flush bag, sodium chloride 0.9%, sodium chloride 0.9%, and sodium chloride 0.9%.    Allergies:  Review of patient's allergies indicates:  No Known  Allergies     Prior Therapy: yes for lymphedema in the past  Social History: pt lives with their spouse  Occupation: pt is working odd jobs   Prior Level of Function: pt has fluctuating lymphedema in right hand, arm, and trunk.  Current Level of Function: When it gets tight and stiff it can limit his mobility. Pt has difficulty with . Pt endorses increased swelling in R side of trunk, arm, and hand  Exercise routine prior to onset: walking  Hand dominance: right      Other Past Medical History: see above    Patient's Goals: Get his right arm and hand down        Subjective   Pt states: Gets intermittent back pain in from spinal involvement   Pain: 5/10 on VAS currently.   Best: 0/10  Worst: 8/10   Pain location: bilateral mid back   Objective     Imaging: PET Scan    Interval increased uptake in the T7 body away from sclerosis and T12 body involving a region of prior sclerosis.  Differential considerations for T7 include focal nodular hyperplasia of marrow versus early, CT occult metastasis, and differential considerations for T12 include active osteoblastic metastasis versus healing metastasis.     Other sclerotic lesions demonstrate uptake similar to normal marrow suggesting response to therapy.     Nonspecific focal uptake at the right temporal fossa without CT correlate may indicate atypically focal muscular uptake.  Attention on follow-up.  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 Formal assessment to be completed as needed      Baseline Measurements of BL UE's for early detection of Lymphedema:     LANDMARK RIGHT UE LEFT UE DIFFERENCE   E + 8" 50 cm 46.5 cm 3.5 cm   E + 6" 45.5 cm 44 cm 1.5 cm   E + 4" 40 cm 40.5 cm 0.5 cm   E + 2" 38.5 cm 38.5 cm 0 cm   Elbow 36.5 cm 34 cm 2.5 cm   W+ 8" 37.5cm 33 cm 4.5 cm   W +  6" 35 cm 29.5 cm 5.5 cm   W + 4" 31.5 cm 25 cm 6.5 cm   Wrist 23 cm 20 cm 3 cm   DPC 28.5 cm 26 cm 2.5 cm   IP Thumb 8.5 cm 8 cm 0.5 cm     Functional Mobility (Bed mobility, transfers)  Independent    ADL's:  Mod I to independent    Gait Assessment:   - AD used: none  - Assistance: independent  - Distance: community distances     Patient Education Provided   - role of therapy in multi - disciplinary team, goals for therapy  -spine precautions (no bending, lifting, heavy lifting)  -potential benefit of compression pump and process for acquiring      Pt has no cultural, educational or language barriers to learning provided.  Treatment and Instruction of Home Exercise Program      Total Time Separate from Evaluation: 40 minutes    Paul received self-care home management for 17 minutes including the following:     - Reviewed with pt lymphedema etiology and management plans.    -pt was advised about importance of compliance with daily use of tubigrip and compression glove and daily manual lymph drainage  -reviewed spine precautions (no bending, lifting (>10lbs), or twisting) with pt and how he can incorporate them in his daily life.  -Pt educated on potential benefit of compression tank for better managing the lymphedema in lateral trunk wall. Pt was provided with options of where he may purchase (online, sporting [employer] store)  -pt encouraged to perform light exercises with right arm t/o the day (hand pumps, biceps curls, wrist flexion/extension    Paul received the following manual therapy techniques were performed to increased " myofascial/soft tissue length, mobility and pliability, increase PROM, AROM and function as well as to decrease pain for 23 minutes      Short Neck- held due to history of seizures  Clear Healthy Lymph Nodes:  Left Axilla                                                  Right Groin  Open Anastomoses:  Anterior Axillo-Axillary  (AAA)                                    Axillo-Inguinal     (AI)                                    Posterior Axillo-Axillary  (GEREMIAS) -not performed today    Right Upper Arm (Lateral and Medial)  Right  Antecubital Fossa  Right Dorsal Forearm  Right Volar Forearm  Dorsum Right Hand  Right fingers    Rework Right UE  Rework Anastomoses  Rework Healthy lymph Nodes    Pt puts on tubigrip that he arrived wearing    Pt was taught self-MLD (manual lymph drainage, steps 3-12); see Media section on 10/10/22 for details.        Written Home Exercises Provided: yes.  Exercises were reviewed and Paul was able to demonstrate them prior to the end of the session.  Paul demonstrated good  understanding of the education provided.     See EMR under Media for exercises provided 10/10/2022.- Self manual lymph drainge      Functional Limitations Reporting      CMS Impairment/Limitation/Restriction for FOTO Upper quadrant edema Survey    Therapist reviewed FOTO scores for Paul Li Jr. on 10/10/2022.   FOTO documents entered into [employer] - see Media section.    Limitations Score: 36%  Category: Carrying           Assessment   This is a 46 y.o. male referred to outpatient physical therapy and presents with a medical diagnosis of right breast cancer and lymphedema and was seen today to address following deficits: right arm lymphedema, decreased strength, and impaired functional mobility. Of note, pt with metastatic disease to bone, so response to treatment may be limited by advanced disease.  Pt tolerated today's treatment and will benefit from continued therapy to address following deficits.    Pt instructed in  HEP this session and was able to perform all exercises given independently. Pt instructed to follow up with therapist if any concerns arise with program established. Pt will continue to benefit from skilled physical therapy to address the impairments stated in chart below, provide patient/family education and to maximize pt's level of independence in home and community environment     Pt prognosis is Good.    Anticipated barriers to physical therapy: none anticipated     Pt's spiritual, cultural and educational needs considered and pt agreeable to plan of care and goals as stated below:     Medical necessity is demonstrated by the following IMPAIRMENTS/PROMBLEM LIST:    History  Co-morbidities and personal factors that may impact the plan of care Co-morbidities:   diabetes, high BMI, history of cancer, HTN, and spine and bony mets    Personal Factors:   no deficits     high   Examination  Body Structures and Functions, activity limitations and participation restrictions that may impact the plan of care Body Regions:   back  upper extremities  trunk    Body Systems:    strength  skin integrity  Posture, lymphedema    Participation Restrictions:   None anticipate    Activity limitations:   Learning and applying knowledge  no deficits    General Tasks and Commands  no deficits    Communication  no deficits    Mobility  lifting and carrying objects  fine hand use (grasping/picking up)    Self care  no deficits    Domestic Life  doing house work (cleaning house, washing dishes, laundry)    Interactions/Relationships  no deficits    Life Areas  employment    Community and Social Life  community life  recreation and leisure         high   Clinical Presentation evolving clinical presentation with changing clinical characteristics moderate   Decision Making/ Complexity Score: moderate       Goals: Pt agrees with goals set    Short Term Goals (6 weeks)  1. Pt will demo decrease in girth in right upper extremity by >/= 2cm to  allow for improved UE symmetry, clothing choice, ROM, and UE function. (progressing, not met)  2. Patient will demonstrate 100% knowledge of lymphedema precautions and signs of infection to allow for reduced risk of lymphedema, reduced risk of infection, and/or exacerbation.  (progressing, not met)  3. Patient will perform self lymph drainage techniques to enhance lymphatic drainage and mobility.  (progressing, not met)  4. Patient will tolerate daily activities with multilayered bandaging to enhance lymphatic drainage and skin elasticity.  (progressing, not met)  5. Pt will tolerate HEP for improved strength, functional mobility, ROM, posture, and endurance. (progressing, not met)       Long Term Goals: ( 12  weeks)  1. Patient to show decreased girth in right upper extremity by >/= 3cm to allow for improved UE symmetry, clothing choice, ROM, and UE function.  (progressing, not met)  2. Patient will show reduction in density to mild or less with improved contour of limb to allow for improved cosmesis, improved lymphatic drainage, and functional mobility.  (progressing, not met)  3. Patient to bruna/doff compression garment with daily compliance to support lymphatic mobility and skin elasticity.  (progressing, not met)  4. Pt to show improved postural awareness and alignment for improved functional mobility.  (progressing, not met)  5. Pt to be independent and compliant with HEP to allow for improved ROM, reach and carry with functional endurance and strength.    (progressing, not met)        Plan   Outpatient physical therapy 2x week for 12 weeks to include the following:   Manual Therapy, Neuromuscular Re-ed, Orthotic Management and Training, Patient Education, Self Care, Therapeutic Activities, and Therapeutic Exercise.    Plan of care Certification Period: 10/10/2022 to 1/6/23.    Therapist: Katelynn Harrell, PT  10/10/2022

## 2022-10-11 ENCOUNTER — OFFICE VISIT (OUTPATIENT)
Dept: UROLOGY | Facility: CLINIC | Age: 47
End: 2022-10-11
Payer: MEDICARE

## 2022-10-11 ENCOUNTER — TELEPHONE (OUTPATIENT)
Dept: UROLOGY | Facility: CLINIC | Age: 47
End: 2022-10-11

## 2022-10-11 ENCOUNTER — OFFICE VISIT (OUTPATIENT)
Dept: PALLIATIVE MEDICINE | Facility: CLINIC | Age: 47
End: 2022-10-11
Payer: MEDICARE

## 2022-10-11 VITALS
HEIGHT: 74 IN | SYSTOLIC BLOOD PRESSURE: 123 MMHG | DIASTOLIC BLOOD PRESSURE: 78 MMHG | WEIGHT: 315 LBS | BODY MASS INDEX: 40.43 KG/M2 | HEART RATE: 73 BPM

## 2022-10-11 VITALS — DIASTOLIC BLOOD PRESSURE: 68 MMHG | HEART RATE: 70 BPM | SYSTOLIC BLOOD PRESSURE: 127 MMHG

## 2022-10-11 DIAGNOSIS — C50.021 MALIGNANT NEOPLASM INVOLVING BOTH NIPPLE AND AREOLA OF RIGHT BREAST IN MALE, ESTROGEN RECEPTOR NEGATIVE: Primary | ICD-10-CM

## 2022-10-11 DIAGNOSIS — R53.0 NEOPLASTIC (MALIGNANT) RELATED FATIGUE: ICD-10-CM

## 2022-10-11 DIAGNOSIS — C79.51 BONE METASTASES: ICD-10-CM

## 2022-10-11 DIAGNOSIS — Z51.5 ENCOUNTER FOR PALLIATIVE CARE: ICD-10-CM

## 2022-10-11 DIAGNOSIS — C80.1 ANXIETY ASSOCIATED WITH CANCER DIAGNOSIS: ICD-10-CM

## 2022-10-11 DIAGNOSIS — Z71.89 ADVANCED CARE PLANNING/COUNSELING DISCUSSION: ICD-10-CM

## 2022-10-11 DIAGNOSIS — G47.00 INSOMNIA, UNSPECIFIED TYPE: ICD-10-CM

## 2022-10-11 DIAGNOSIS — G89.3 CANCER RELATED PAIN: ICD-10-CM

## 2022-10-11 DIAGNOSIS — Z17.1 MALIGNANT NEOPLASM INVOLVING BOTH NIPPLE AND AREOLA OF RIGHT BREAST IN MALE, ESTROGEN RECEPTOR NEGATIVE: Primary | ICD-10-CM

## 2022-10-11 DIAGNOSIS — M79.2 NEUROPATHIC PAIN: ICD-10-CM

## 2022-10-11 DIAGNOSIS — N50.89 SCROTAL MASS: ICD-10-CM

## 2022-10-11 DIAGNOSIS — N43.3 HYDROCELE IN ADULT: Primary | ICD-10-CM

## 2022-10-11 DIAGNOSIS — R19.7 DIARRHEA, UNSPECIFIED TYPE: ICD-10-CM

## 2022-10-11 DIAGNOSIS — Z00.00 ENCOUNTER FOR MEDICAL EXAMINATION TO ESTABLISH CARE: Primary | ICD-10-CM

## 2022-10-11 DIAGNOSIS — G89.3 NEOPLASM RELATED PAIN: ICD-10-CM

## 2022-10-11 DIAGNOSIS — N43.3 HYDROCELE IN ADULT: ICD-10-CM

## 2022-10-11 DIAGNOSIS — F43.23 ADJUSTMENT DISORDER WITH MIXED ANXIETY AND DEPRESSED MOOD: ICD-10-CM

## 2022-10-11 DIAGNOSIS — F41.1 ANXIETY ASSOCIATED WITH CANCER DIAGNOSIS: ICD-10-CM

## 2022-10-11 PROBLEM — Z74.09 IMPAIRED FUNCTIONAL MOBILITY AND ACTIVITY TOLERANCE: Status: ACTIVE | Noted: 2022-10-11

## 2022-10-11 PROBLEM — I89.0 LYMPHEDEMA OF RIGHT ARM: Status: ACTIVE | Noted: 2022-10-11

## 2022-10-11 PROBLEM — R29.3 POOR POSTURE: Status: ACTIVE | Noted: 2022-10-11

## 2022-10-11 PROCEDURE — 99999 PR PBB SHADOW E&M-EST. PATIENT-LVL III: CPT | Mod: PBBFAC,,, | Performed by: STUDENT IN AN ORGANIZED HEALTH CARE EDUCATION/TRAINING PROGRAM

## 2022-10-11 PROCEDURE — 4010F ACE/ARB THERAPY RXD/TAKEN: CPT | Mod: CPTII,S$GLB,, | Performed by: STUDENT IN AN ORGANIZED HEALTH CARE EDUCATION/TRAINING PROGRAM

## 2022-10-11 PROCEDURE — 4010F PR ACE/ARB THEARPY RXD/TAKEN: ICD-10-PCS | Mod: CPTII,S$GLB,, | Performed by: STUDENT IN AN ORGANIZED HEALTH CARE EDUCATION/TRAINING PROGRAM

## 2022-10-11 PROCEDURE — 3078F PR MOST RECENT DIASTOLIC BLOOD PRESSURE < 80 MM HG: ICD-10-PCS | Mod: CPTII,S$GLB,, | Performed by: STUDENT IN AN ORGANIZED HEALTH CARE EDUCATION/TRAINING PROGRAM

## 2022-10-11 PROCEDURE — 1160F RVW MEDS BY RX/DR IN RCRD: CPT | Mod: CPTII,S$GLB,, | Performed by: UROLOGY

## 2022-10-11 PROCEDURE — 99215 PR OFFICE/OUTPT VISIT, EST, LEVL V, 40-54 MIN: ICD-10-PCS | Mod: S$GLB,,, | Performed by: STUDENT IN AN ORGANIZED HEALTH CARE EDUCATION/TRAINING PROGRAM

## 2022-10-11 PROCEDURE — 3074F SYST BP LT 130 MM HG: CPT | Mod: CPTII,S$GLB,, | Performed by: UROLOGY

## 2022-10-11 PROCEDURE — 3074F PR MOST RECENT SYSTOLIC BLOOD PRESSURE < 130 MM HG: ICD-10-PCS | Mod: CPTII,S$GLB,, | Performed by: UROLOGY

## 2022-10-11 PROCEDURE — 3074F PR MOST RECENT SYSTOLIC BLOOD PRESSURE < 130 MM HG: ICD-10-PCS | Mod: CPTII,S$GLB,, | Performed by: STUDENT IN AN ORGANIZED HEALTH CARE EDUCATION/TRAINING PROGRAM

## 2022-10-11 PROCEDURE — 1160F PR REVIEW ALL MEDS BY PRESCRIBER/CLIN PHARMACIST DOCUMENTED: ICD-10-PCS | Mod: CPTII,S$GLB,, | Performed by: STUDENT IN AN ORGANIZED HEALTH CARE EDUCATION/TRAINING PROGRAM

## 2022-10-11 PROCEDURE — 3078F DIAST BP <80 MM HG: CPT | Mod: CPTII,S$GLB,, | Performed by: STUDENT IN AN ORGANIZED HEALTH CARE EDUCATION/TRAINING PROGRAM

## 2022-10-11 PROCEDURE — 3044F PR MOST RECENT HEMOGLOBIN A1C LEVEL <7.0%: ICD-10-PCS | Mod: CPTII,S$GLB,, | Performed by: UROLOGY

## 2022-10-11 PROCEDURE — 99417 PROLNG OP E/M EACH 15 MIN: CPT | Mod: S$GLB,,, | Performed by: STUDENT IN AN ORGANIZED HEALTH CARE EDUCATION/TRAINING PROGRAM

## 2022-10-11 PROCEDURE — 4010F ACE/ARB THERAPY RXD/TAKEN: CPT | Mod: CPTII,S$GLB,, | Performed by: UROLOGY

## 2022-10-11 PROCEDURE — 99999 PR PBB SHADOW E&M-EST. PATIENT-LVL V: CPT | Mod: PBBFAC,,, | Performed by: UROLOGY

## 2022-10-11 PROCEDURE — 3044F PR MOST RECENT HEMOGLOBIN A1C LEVEL <7.0%: ICD-10-PCS | Mod: CPTII,S$GLB,, | Performed by: STUDENT IN AN ORGANIZED HEALTH CARE EDUCATION/TRAINING PROGRAM

## 2022-10-11 PROCEDURE — 99215 OFFICE O/P EST HI 40 MIN: CPT | Mod: S$GLB,,, | Performed by: STUDENT IN AN ORGANIZED HEALTH CARE EDUCATION/TRAINING PROGRAM

## 2022-10-11 PROCEDURE — 1159F PR MEDICATION LIST DOCUMENTED IN MEDICAL RECORD: ICD-10-PCS | Mod: CPTII,S$GLB,, | Performed by: UROLOGY

## 2022-10-11 PROCEDURE — 99999 PR PBB SHADOW E&M-EST. PATIENT-LVL V: ICD-10-PCS | Mod: PBBFAC,,, | Performed by: UROLOGY

## 2022-10-11 PROCEDURE — 1160F PR REVIEW ALL MEDS BY PRESCRIBER/CLIN PHARMACIST DOCUMENTED: ICD-10-PCS | Mod: CPTII,S$GLB,, | Performed by: UROLOGY

## 2022-10-11 PROCEDURE — 3078F DIAST BP <80 MM HG: CPT | Mod: CPTII,S$GLB,, | Performed by: UROLOGY

## 2022-10-11 PROCEDURE — 1159F MED LIST DOCD IN RCRD: CPT | Mod: CPTII,S$GLB,, | Performed by: UROLOGY

## 2022-10-11 PROCEDURE — 3044F HG A1C LEVEL LT 7.0%: CPT | Mod: CPTII,S$GLB,, | Performed by: UROLOGY

## 2022-10-11 PROCEDURE — 3008F PR BODY MASS INDEX (BMI) DOCUMENTED: ICD-10-PCS | Mod: CPTII,S$GLB,, | Performed by: UROLOGY

## 2022-10-11 PROCEDURE — 99999 PR PBB SHADOW E&M-EST. PATIENT-LVL III: ICD-10-PCS | Mod: PBBFAC,,, | Performed by: STUDENT IN AN ORGANIZED HEALTH CARE EDUCATION/TRAINING PROGRAM

## 2022-10-11 PROCEDURE — 4010F PR ACE/ARB THEARPY RXD/TAKEN: ICD-10-PCS | Mod: CPTII,S$GLB,, | Performed by: UROLOGY

## 2022-10-11 PROCEDURE — 1159F PR MEDICATION LIST DOCUMENTED IN MEDICAL RECORD: ICD-10-PCS | Mod: CPTII,S$GLB,, | Performed by: STUDENT IN AN ORGANIZED HEALTH CARE EDUCATION/TRAINING PROGRAM

## 2022-10-11 PROCEDURE — 3074F SYST BP LT 130 MM HG: CPT | Mod: CPTII,S$GLB,, | Performed by: STUDENT IN AN ORGANIZED HEALTH CARE EDUCATION/TRAINING PROGRAM

## 2022-10-11 PROCEDURE — 99213 PR OFFICE/OUTPT VISIT, EST, LEVL III, 20-29 MIN: ICD-10-PCS | Mod: S$GLB,,, | Performed by: UROLOGY

## 2022-10-11 PROCEDURE — 1159F MED LIST DOCD IN RCRD: CPT | Mod: CPTII,S$GLB,, | Performed by: STUDENT IN AN ORGANIZED HEALTH CARE EDUCATION/TRAINING PROGRAM

## 2022-10-11 PROCEDURE — 3008F BODY MASS INDEX DOCD: CPT | Mod: CPTII,S$GLB,, | Performed by: UROLOGY

## 2022-10-11 PROCEDURE — 3078F PR MOST RECENT DIASTOLIC BLOOD PRESSURE < 80 MM HG: ICD-10-PCS | Mod: CPTII,S$GLB,, | Performed by: UROLOGY

## 2022-10-11 PROCEDURE — 3044F HG A1C LEVEL LT 7.0%: CPT | Mod: CPTII,S$GLB,, | Performed by: STUDENT IN AN ORGANIZED HEALTH CARE EDUCATION/TRAINING PROGRAM

## 2022-10-11 PROCEDURE — 99213 OFFICE O/P EST LOW 20 MIN: CPT | Mod: S$GLB,,, | Performed by: UROLOGY

## 2022-10-11 PROCEDURE — 1160F RVW MEDS BY RX/DR IN RCRD: CPT | Mod: CPTII,S$GLB,, | Performed by: STUDENT IN AN ORGANIZED HEALTH CARE EDUCATION/TRAINING PROGRAM

## 2022-10-11 PROCEDURE — 99417 PR PROLONGED SVC, OUTPT, W/WO DIRECT PT CONTACT,  EA ADDTL 15 MIN: ICD-10-PCS | Mod: S$GLB,,, | Performed by: STUDENT IN AN ORGANIZED HEALTH CARE EDUCATION/TRAINING PROGRAM

## 2022-10-11 RX ORDER — LOSARTAN POTASSIUM 50 MG/1
TABLET ORAL
Qty: 180 TABLET | Refills: 3 | OUTPATIENT
Start: 2022-10-11

## 2022-10-11 RX ORDER — HYDROCHLOROTHIAZIDE 25 MG/1
TABLET ORAL
Qty: 90 TABLET | Refills: 3 | OUTPATIENT
Start: 2022-10-11

## 2022-10-11 RX ORDER — ALPRAZOLAM 0.5 MG/1
0.5 TABLET ORAL 3 TIMES DAILY PRN
Qty: 60 TABLET | Refills: 0 | Status: SHIPPED | OUTPATIENT
Start: 2022-10-11 | End: 2023-02-02 | Stop reason: SDUPTHER

## 2022-10-11 RX ORDER — HYDROCODONE BITARTRATE AND ACETAMINOPHEN 10; 325 MG/1; MG/1
1 TABLET ORAL EVERY 6 HOURS PRN
Qty: 90 TABLET | Refills: 0 | Status: SHIPPED | OUTPATIENT
Start: 2022-10-11 | End: 2022-11-07 | Stop reason: SDUPTHER
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
## 2022-10-11 NOTE — PROGRESS NOTES
Subjective:       Patient ID: Paul Li Jr. is a 46 y.o. male.    Chief Complaint: hydrocele    HPI  patient is here for right hydrocele he has had it drained multiple times on the [place]  He is being treated with chemotherapy for breast cancer.  He is not having any voiding difficulty    Past Medical History:   Diagnosis Date    Arthritis     Breast cancer     Cancer     R breast    Diabetes mellitus     HTN (hypertension)     Leg edema, right     Prediabetes     Seizures     at age 18 - stress related    Therapy     marital counseling       Past Surgical History:   Procedure Laterality Date    AXILLARY NODE DISSECTION Right 11/22/2019    Procedure: LYMPHADENECTOMY, AXILLARY RIGHT;  Surgeon: Shima Whyte MD;  Location: [facility];  Service: General;  Laterality: Right;    AXILLARY NODE DISSECTION Right 12/17/2019    Procedure: LYMPHADENECTOMY, AXILLARY;  Surgeon: Shima Whyte MD;  [facility];  Service: General;  Laterality: Right;    BREAST BIOPSY      INSERTION OF TUNNELED CENTRAL VENOUS CATHETER (CVC) WITH SUBCUTANEOUS PORT Left 5/24/2019    Procedure: [id number];  Surgeon: Shima Whyte MD;  [facility];  Service: General;  Laterality: Left;    INSERTION OF TUNNELED CENTRAL VENOUS CATHETER (CVC) WITH SUBCUTANEOUS PORT Left 9/17/2021    Procedure: INSERTION, SINGLE LUMEN CATHETER WITH PORT, WITH FLUOROSCOPIC GUIDANCE, right;  Surgeon: Gloria Holliday MD;  [facility];  Service: General;  Laterality: Left;  rapid covid test    SENTINEL LYMPH NODE BIOPSY Right 11/22/2019    Procedure: BIOPSY, LYMPH NODE, SENTINEL RIGHT;  Surgeon: Shima Whyte MD;  Location: [facility];  Service: General;  Laterality: Right;    SIMPLE MASTECTOMY Right 11/22/2019    Procedure: MASTECTOMY, SIMPLE RIGHT (CONSENT AM OF) 2.5 hr case;  Surgeon: Shima Whyte MD;  Location: [facility];  Service: General;  Laterality: Right;       Family History   Problem Relation Age of Onset     Hypertension Mother     Diabetes Mother     Hypertension Father     Lupus Father     Post-traumatic stress disorder Brother     No Known Problems Maternal Grandmother     Colon cancer Maternal Grandfather     Breast cancer Paternal Grandmother     No Known Problems Paternal Grandfather     No Known Problems Sister     No Known Problems Maternal Aunt     No Known Problems Maternal Uncle     Fibromyalgia Paternal Aunt     Lupus Paternal Aunt     No Known Problems Paternal Uncle     No Known Problems Brother     No Known Problems Sister     No Known Problems Son     No Known Problems Son     No Known Problems Son     No Known Problems Son        Social History     Socioeconomic History    Marital status: Single    Number of children: 3   Occupational History    Occupation:    Tobacco Use    Smoking status: Former     Packs/day: 0.25     Years: 15.00     Pack years: 3.75     Types: Cigarettes     Quit date: 2014     Years since quittin.8    Smokeless tobacco: Never    Tobacco comments:     Social smoker with alcohol    Substance and Sexual Activity    Alcohol use: Yes     Alcohol/week: 0.0 standard drinks     Comment: Rarely    Drug use: Not Currently     Types: Marijuana    Sexual activity: Yes     Partners: Female     Birth control/protection: None   Social History Narrative    From [place]    Lives with wife    3 sons in college    Wife recent loss of 6 month pregnancy (May 1, 2019)     Social Determinants of Health     Financial Resource Strain: High Risk    Difficulty of Paying Living Expenses: Hard   Food Insecurity: No Food Insecurity    Worried About Running Out of Food in the Last Year: Never true    Ran Out of Food in the Last Year: Never true   Transportation Needs: Unmet Transportation Needs    Lack of Transportation (Medical): Yes    Lack of Transportation (Non-Medical): No   Physical Activity: Insufficiently Active    Days of Exercise per Week: 3 days    Minutes of Exercise per  Session: 30 min   Stress: Stress Concern Present    Feeling of Stress : To some extent   Social Connections: Unknown    Frequency of Communication with Friends and Family: More than three times a week    Frequency of Social Gatherings with Friends and Family: Twice a week    Active Member of Clubs or Organizations: Yes    Attends Club or Organization Meetings: More than 4 times per year    Marital Status:    Housing Stability: High Risk    Unable to Pay for Housing in the Last Year: Yes    Number of Places Lived in the Last Year: 1    Unstable Housing in the Last Year: No       Allergies:  Patient has no known allergies.    Medications:    Current Outpatient Medications:     alpelisib 300 mg/day (150 mg x 2) Tab, Take 2 tablets (300 mg) by mouth once daily., Disp: 60 tablet, Rfl: 3    ALPRAZolam (XANAX) 0.5 MG tablet, Take 1 tablet (0.5 mg total) by mouth 3 (three) times daily as needed for Insomnia or Anxiety., Disp: 60 tablet, Rfl: 0    amLODIPine (NORVASC) 10 MG tablet, TAKE 1 TABLET (10 MG) BY MOUTH EVERY DAY, Disp: 90 tablet, Rfl: 3    calcium-vitamin D3 (OYSTER SHELL CALCIUM-VIT D3) 500 mg-5 mcg (200 unit) per tablet, Take 1 tablet by mouth 2 (two) times daily., Disp: 180 tablet, Rfl: 3    diphenoxylate-atropine 2.5-0.025 mg (LOMOTIL) 2.5-0.025 mg per tablet, Take 1 tablet by mouth 4 (four) times daily as needed for Diarrhea., Disp: 60 tablet, Rfl: 2    dulaglutide (TRULICITY) 1.5 mg/0.5 mL pen injector, Inject 1.5 mg into the skin once a week., Disp: 4 pen, Rfl: 2    FLUoxetine 20 MG capsule, Take 2 capsules (40 mg total) by mouth once daily., Disp: 90 capsule, Rfl: 1    gabapentin (NEURONTIN) 300 MG capsule, Take 1 capsule (300 mg total) by mouth 3 (three) times daily., Disp: 90 capsule, Rfl: 11    hydroCHLOROthiazide (HYDRODIURIL) 25 MG tablet, TAKE 1 TABLET BY MOUTH ONCE DAILY, Disp: 90 tablet, Rfl: 3    HYDROcodone-acetaminophen (NORCO)  mg per tablet, Take 1 tablet by mouth every 6 (six)  "hours as needed for Pain., Disp: 90 tablet, Rfl: 0    insulin detemir U-100 (LEVEMIR FLEXTOUCH U-100 INSULN) 100 unit/mL (3 mL) InPn pen, Inject 21 Units into the skin every evening., Disp: 6 mL, Rfl: 2    insulin lispro (HUMALOG KWIKPEN INSULIN) 100 unit/mL pen, Inject 5 Units into the skin 3 (three) times daily., Disp: 6 mL, Rfl: 11    lancets 33 gauge Misc, 1 lancet by Misc.(Non-Drug; Combo Route) route once daily., Disp: 100 each, Rfl: 3    LIDOcaine-prilocaine (EMLA) cream, Apply topically as needed., Disp: 30 g, Rfl: 0    losartan (COZAAR) 50 MG tablet, Take 2 tablets by mouth once daily, Disp: 180 tablet, Rfl: 3    metFORMIN (GLUCOPHAGE) 500 MG tablet, Take 2 tablets (1,000 mg total) by mouth 2 (two) times daily with meals. Needs appointment for future refills, Disp: 120 tablet, Rfl: 2    pen needle, diabetic (BD ULTRA-FINE TANESHA PEN NEEDLE) 32 gauge x 5/32" Ndle, Use as directed with novolog and levemir, Disp: 100 each, Rfl: 5    tadalafiL (CIALIS) 5 MG tablet, Take 2 tablets (10 mg total) by mouth daily as needed for Erectile Dysfunction., Disp: 20 tablet, Rfl: 1    lancing device Misc, 1 Device by Misc.(Non-Drug; Combo Route) route 2 (two) times daily with meals., Disp: 1 each, Rfl: 0  No current facility-administered medications for this visit.    Facility-Administered Medications Ordered in Other Visits:     0.9%  NaCl infusion, , Intravenous, Continuous, Rosa Linn MD, Stopped at 11/19/21 1634    alteplase injection 2 mg, 2 mg, Intra-Catheter, PRN, Rosa Linn MD    heparin, porcine (PF) 100 unit/mL injection flush 500 Units, 500 Units, Intravenous, 1 time in Clinic/HOD, Richa Bahena MD    heparin, porcine (PF) 100 unit/mL injection flush 500 Units, 500 Units, Intravenous, PRN, Rosa Linn MD    heparin, porcine (PF) 100 unit/mL injection flush 500 Units, 500 Units, Intravenous, PRN, Rosa Linn MD, 500 Units at 11/19/21 1635    sodium chloride 0.9% 250 mL flush bag, , Intravenous, 1 " time in Clinic/HOD, Rosa Linn MD    sodium chloride 0.9% flush 10 mL, 10 mL, Intravenous, 1 time in Clinic/HOD, Richa Bahena MD    sodium chloride 0.9% flush 10 mL, 10 mL, Intravenous, PRN, Rosa Linn MD, 10 mL at 11/19/21 1501    sodium chloride 0.9% flush 10 mL, 10 mL, Intravenous, PRN, Rosa Linn MD, 10 mL at 11/19/21 1635    Review of Systems   Constitutional:  Negative for activity change, appetite change, chills, diaphoresis, fatigue, fever and unexpected weight change.   HENT:  Negative for congestion, dental problem, hearing loss, mouth sores, postnasal drip, rhinorrhea, sinus pressure and trouble swallowing.    Eyes:  Negative for pain, discharge and itching.   Respiratory:  Negative for apnea, cough, choking, chest tightness, shortness of breath and wheezing.    Cardiovascular:  Negative for chest pain, palpitations and leg swelling.   Gastrointestinal:  Negative for abdominal distention, abdominal pain, anal bleeding, blood in stool, constipation, diarrhea, nausea, rectal pain and vomiting.   Endocrine: Negative for polydipsia and polyuria.   Genitourinary:  Negative for decreased urine volume, difficulty urinating, dysuria, enuresis, flank pain, frequency, genital sores, hematuria, penile discharge, penile pain, penile swelling, scrotal swelling, testicular pain and urgency.   Musculoskeletal:  Negative for arthralgias, back pain and myalgias.   Skin:  Negative for color change, rash and wound.   Neurological:  Negative for dizziness, syncope, speech difficulty, light-headedness and headaches.   Hematological:  Negative for adenopathy. Does not bruise/bleed easily.   Psychiatric/Behavioral:  Negative for behavioral problems, confusion, hallucinations and sleep disturbance.      Objective:      Physical Exam  Constitutional:       Appearance: He is well-developed.   HENT:      Head: Normocephalic.   Cardiovascular:      Rate and Rhythm: Normal rate.   Pulmonary:      Effort: Pulmonary  effort is normal.   Abdominal:      Palpations: Abdomen is soft.   Genitourinary:     Comments: Moderate right sided hydrocele.  Patient states it is tender and cumbersome and he would like to have it fixed in between chemotherapy regimens  Skin:     General: Skin is warm.   Neurological:      Mental Status: He is alert.       Assessment:       1. Encounter for medical examination to establish care    2. Hydrocele in adult    3. Scrotal mass          Plan:       Paul was seen today for hydrocele.    Diagnoses and all orders for this visit:    Encounter for medical examination to establish care  -     Ambulatory referral/consult to Internal Medicine; Future    Hydrocele in adult  -     Ambulatory referral/consult to Urology  -     US Scrotum And Testicles; Future    Scrotal mass      Will get ultrasound of the scrotum and try and coordinate surgery with his chemotherapy regimens.  He will speak with Dr. Linn about the best timing for this.  All risks and benefits were carefully discussed including loss of testicle    Patient will need clearance by his PCP to P and he would like to establish care with a new 1 on the [place]
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
## 2022-10-11 NOTE — H&P (VIEW-ONLY)
Subjective:       Patient ID: Paul Li Jr. is a 46 y.o. male.    Chief Complaint: hydrocele    HPI  patient is here for right hydrocele he has had it drained multiple times on the [place]  He is being treated with chemotherapy for breast cancer.  He is not having any voiding difficulty    Past Medical History:   Diagnosis Date    Arthritis     Breast cancer     Cancer     R breast    Diabetes mellitus     HTN (hypertension)     Leg edema, right     Prediabetes     Seizures     at age 18 - stress related    Therapy     marital counseling       Past Surgical History:   Procedure Laterality Date    AXILLARY NODE DISSECTION Right 11/22/2019    Procedure: LYMPHADENECTOMY, AXILLARY RIGHT;  Surgeon: Shima Whyte MD;  Location: [facility];  Service: General;  Laterality: Right;    AXILLARY NODE DISSECTION Right 12/17/2019    Procedure: LYMPHADENECTOMY, AXILLARY;  Surgeon: Shima Whyte MD;  [facility];  Service: General;  Laterality: Right;    BREAST BIOPSY      INSERTION OF TUNNELED CENTRAL VENOUS CATHETER (CVC) WITH SUBCUTANEOUS PORT Left 5/24/2019    Procedure: [id number];  Surgeon: Shima Whyte MD;  [facility];  Service: General;  Laterality: Left;    INSERTION OF TUNNELED CENTRAL VENOUS CATHETER (CVC) WITH SUBCUTANEOUS PORT Left 9/17/2021    Procedure: INSERTION, SINGLE LUMEN CATHETER WITH PORT, WITH FLUOROSCOPIC GUIDANCE, right;  Surgeon: Gloria Holliday MD;  [facility];  Service: General;  Laterality: Left;  rapid covid test    SENTINEL LYMPH NODE BIOPSY Right 11/22/2019    Procedure: BIOPSY, LYMPH NODE, SENTINEL RIGHT;  Surgeon: Shima Whyte MD;  Location: [facility];  Service: General;  Laterality: Right;    SIMPLE MASTECTOMY Right 11/22/2019    Procedure: MASTECTOMY, SIMPLE RIGHT (CONSENT AM OF) 2.5 hr case;  Surgeon: Shima Whyte MD;  Location: [facility];  Service: General;  Laterality: Right;       Family History   Problem Relation Age of Onset     Hypertension Mother     Diabetes Mother     Hypertension Father     Lupus Father     Post-traumatic stress disorder Brother     No Known Problems Maternal Grandmother     Colon cancer Maternal Grandfather     Breast cancer Paternal Grandmother     No Known Problems Paternal Grandfather     No Known Problems Sister     No Known Problems Maternal Aunt     No Known Problems Maternal Uncle     Fibromyalgia Paternal Aunt     Lupus Paternal Aunt     No Known Problems Paternal Uncle     No Known Problems Brother     No Known Problems Sister     No Known Problems Son     No Known Problems Son     No Known Problems Son     No Known Problems Son        Social History     Socioeconomic History    Marital status: Single    Number of children: 3   Occupational History    Occupation:    Tobacco Use    Smoking status: Former     Packs/day: 0.25     Years: 15.00     Pack years: 3.75     Types: Cigarettes     Quit date: 2014     Years since quittin.8    Smokeless tobacco: Never    Tobacco comments:     Social smoker with alcohol    Substance and Sexual Activity    Alcohol use: Yes     Alcohol/week: 0.0 standard drinks     Comment: Rarely    Drug use: Not Currently     Types: Marijuana    Sexual activity: Yes     Partners: Female     Birth control/protection: None   Social History Narrative    From [place]    Lives with wife    3 sons in college    Wife recent loss of 6 month pregnancy (May 1, 2019)     Social Determinants of Health     Financial Resource Strain: High Risk    Difficulty of Paying Living Expenses: Hard   Food Insecurity: No Food Insecurity    Worried About Running Out of Food in the Last Year: Never true    Ran Out of Food in the Last Year: Never true   Transportation Needs: Unmet Transportation Needs    Lack of Transportation (Medical): Yes    Lack of Transportation (Non-Medical): No   Physical Activity: Insufficiently Active    Days of Exercise per Week: 3 days    Minutes of Exercise per  Session: 30 min   Stress: Stress Concern Present    Feeling of Stress : To some extent   Social Connections: Unknown    Frequency of Communication with Friends and Family: More than three times a week    Frequency of Social Gatherings with Friends and Family: Twice a week    Active Member of Clubs or Organizations: Yes    Attends Club or Organization Meetings: More than 4 times per year    Marital Status:    Housing Stability: High Risk    Unable to Pay for Housing in the Last Year: Yes    Number of Places Lived in the Last Year: 1    Unstable Housing in the Last Year: No       Allergies:  Patient has no known allergies.    Medications:    Current Outpatient Medications:     alpelisib 300 mg/day (150 mg x 2) Tab, Take 2 tablets (300 mg) by mouth once daily., Disp: 60 tablet, Rfl: 3    ALPRAZolam (XANAX) 0.5 MG tablet, Take 1 tablet (0.5 mg total) by mouth 3 (three) times daily as needed for Insomnia or Anxiety., Disp: 60 tablet, Rfl: 0    amLODIPine (NORVASC) 10 MG tablet, TAKE 1 TABLET (10 MG) BY MOUTH EVERY DAY, Disp: 90 tablet, Rfl: 3    calcium-vitamin D3 (OYSTER SHELL CALCIUM-VIT D3) 500 mg-5 mcg (200 unit) per tablet, Take 1 tablet by mouth 2 (two) times daily., Disp: 180 tablet, Rfl: 3    diphenoxylate-atropine 2.5-0.025 mg (LOMOTIL) 2.5-0.025 mg per tablet, Take 1 tablet by mouth 4 (four) times daily as needed for Diarrhea., Disp: 60 tablet, Rfl: 2    dulaglutide (TRULICITY) 1.5 mg/0.5 mL pen injector, Inject 1.5 mg into the skin once a week., Disp: 4 pen, Rfl: 2    FLUoxetine 20 MG capsule, Take 2 capsules (40 mg total) by mouth once daily., Disp: 90 capsule, Rfl: 1    gabapentin (NEURONTIN) 300 MG capsule, Take 1 capsule (300 mg total) by mouth 3 (three) times daily., Disp: 90 capsule, Rfl: 11    hydroCHLOROthiazide (HYDRODIURIL) 25 MG tablet, TAKE 1 TABLET BY MOUTH ONCE DAILY, Disp: 90 tablet, Rfl: 3    HYDROcodone-acetaminophen (NORCO)  mg per tablet, Take 1 tablet by mouth every 6 (six)  "hours as needed for Pain., Disp: 90 tablet, Rfl: 0    insulin detemir U-100 (LEVEMIR FLEXTOUCH U-100 INSULN) 100 unit/mL (3 mL) InPn pen, Inject 21 Units into the skin every evening., Disp: 6 mL, Rfl: 2    insulin lispro (HUMALOG KWIKPEN INSULIN) 100 unit/mL pen, Inject 5 Units into the skin 3 (three) times daily., Disp: 6 mL, Rfl: 11    lancets 33 gauge Misc, 1 lancet by Misc.(Non-Drug; Combo Route) route once daily., Disp: 100 each, Rfl: 3    LIDOcaine-prilocaine (EMLA) cream, Apply topically as needed., Disp: 30 g, Rfl: 0    losartan (COZAAR) 50 MG tablet, Take 2 tablets by mouth once daily, Disp: 180 tablet, Rfl: 3    metFORMIN (GLUCOPHAGE) 500 MG tablet, Take 2 tablets (1,000 mg total) by mouth 2 (two) times daily with meals. Needs appointment for future refills, Disp: 120 tablet, Rfl: 2    pen needle, diabetic (BD ULTRA-FINE TANESHA PEN NEEDLE) 32 gauge x 5/32" Ndle, Use as directed with novolog and levemir, Disp: 100 each, Rfl: 5    tadalafiL (CIALIS) 5 MG tablet, Take 2 tablets (10 mg total) by mouth daily as needed for Erectile Dysfunction., Disp: 20 tablet, Rfl: 1    lancing device Misc, 1 Device by Misc.(Non-Drug; Combo Route) route 2 (two) times daily with meals., Disp: 1 each, Rfl: 0  No current facility-administered medications for this visit.    Facility-Administered Medications Ordered in Other Visits:     0.9%  NaCl infusion, , Intravenous, Continuous, Rosa Linn MD, Stopped at 11/19/21 1634    alteplase injection 2 mg, 2 mg, Intra-Catheter, PRN, Rosa Linn MD    heparin, porcine (PF) 100 unit/mL injection flush 500 Units, 500 Units, Intravenous, 1 time in Clinic/HOD, Richa Bahena MD    heparin, porcine (PF) 100 unit/mL injection flush 500 Units, 500 Units, Intravenous, PRN, Rosa Linn MD    heparin, porcine (PF) 100 unit/mL injection flush 500 Units, 500 Units, Intravenous, PRN, Rosa Linn MD, 500 Units at 11/19/21 1635    sodium chloride 0.9% 250 mL flush bag, , Intravenous, 1 " time in Clinic/HOD, Rosa Linn MD    sodium chloride 0.9% flush 10 mL, 10 mL, Intravenous, 1 time in Clinic/HOD, Richa Bahena MD    sodium chloride 0.9% flush 10 mL, 10 mL, Intravenous, PRN, Rosa Linn MD, 10 mL at 11/19/21 1501    sodium chloride 0.9% flush 10 mL, 10 mL, Intravenous, PRN, Rosa Linn MD, 10 mL at 11/19/21 1635    Review of Systems   Constitutional:  Negative for activity change, appetite change, chills, diaphoresis, fatigue, fever and unexpected weight change.   HENT:  Negative for congestion, dental problem, hearing loss, mouth sores, postnasal drip, rhinorrhea, sinus pressure and trouble swallowing.    Eyes:  Negative for pain, discharge and itching.   Respiratory:  Negative for apnea, cough, choking, chest tightness, shortness of breath and wheezing.    Cardiovascular:  Negative for chest pain, palpitations and leg swelling.   Gastrointestinal:  Negative for abdominal distention, abdominal pain, anal bleeding, blood in stool, constipation, diarrhea, nausea, rectal pain and vomiting.   Endocrine: Negative for polydipsia and polyuria.   Genitourinary:  Negative for decreased urine volume, difficulty urinating, dysuria, enuresis, flank pain, frequency, genital sores, hematuria, penile discharge, penile pain, penile swelling, scrotal swelling, testicular pain and urgency.   Musculoskeletal:  Negative for arthralgias, back pain and myalgias.   Skin:  Negative for color change, rash and wound.   Neurological:  Negative for dizziness, syncope, speech difficulty, light-headedness and headaches.   Hematological:  Negative for adenopathy. Does not bruise/bleed easily.   Psychiatric/Behavioral:  Negative for behavioral problems, confusion, hallucinations and sleep disturbance.      Objective:      Physical Exam  Constitutional:       Appearance: He is well-developed.   HENT:      Head: Normocephalic.   Cardiovascular:      Rate and Rhythm: Normal rate.   Pulmonary:      Effort: Pulmonary  effort is normal.   Abdominal:      Palpations: Abdomen is soft.   Genitourinary:     Comments: Moderate right sided hydrocele.  Patient states it is tender and cumbersome and he would like to have it fixed in between chemotherapy regimens  Skin:     General: Skin is warm.   Neurological:      Mental Status: He is alert.       Assessment:       1. Encounter for medical examination to establish care    2. Hydrocele in adult    3. Scrotal mass          Plan:       Paul was seen today for hydrocele.    Diagnoses and all orders for this visit:    Encounter for medical examination to establish care  -     Ambulatory referral/consult to Internal Medicine; Future    Hydrocele in adult  -     Ambulatory referral/consult to Urology  -     US Scrotum And Testicles; Future    Scrotal mass      Will get ultrasound of the scrotum and try and coordinate surgery with his chemotherapy regimens.  He will speak with Dr. Linn about the best timing for this.  All risks and benefits were carefully discussed including loss of testicle    Patient will need clearance by his PCP to P and he would like to establish care with a new 1 on the [place]

## 2022-10-11 NOTE — TELEPHONE ENCOUNTER
Care Due:                  Date            Visit Type   Department     Provider  --------------------------------------------------------------------------------                                EP -                              PRIMARY      [facility] FAMILY  Last Visit: 04-      CARE ([facility])   MEDICINE       PATRICIA LAFLEUR  Next Visit: None Scheduled  None         None Found                                                            Last  Test          Frequency    Reason                     Performed    Due Date  --------------------------------------------------------------------------------    Office Visit  12 months..  dulaglutide,               04-   04-                             hydroCHLOROthiazide,                             insulin, losartan........    HBA1C.......  6 months...  dulaglutide, insulin.....  05- 11-    [employer] Embedded Care Gaps. Reference number: [id number]. 10/11/2022   9:53:52 AM CDT

## 2022-10-12 ENCOUNTER — TELEPHONE (OUTPATIENT)
Dept: PALLIATIVE MEDICINE | Facility: CLINIC | Age: 47
End: 2022-10-12
Payer: MEDICARE

## 2022-10-12 ENCOUNTER — TELEPHONE (OUTPATIENT)
Dept: HEMATOLOGY/ONCOLOGY | Facility: CLINIC | Age: 47
End: 2022-10-12

## 2022-10-12 ENCOUNTER — SPECIALTY PHARMACY (OUTPATIENT)
Dept: PHARMACY | Facility: CLINIC | Age: 47
End: 2022-10-12
Payer: MEDICARE

## 2022-10-12 DIAGNOSIS — I10 ESSENTIAL HYPERTENSION: Primary | ICD-10-CM

## 2022-10-12 NOTE — TELEPHONE ENCOUNTER
"SW spoke with patient regarding outpatient psych follow up. Patient reports he called the number provided and reached a busy signal.  SW asked patient to recall number the dialed.  SW discovered the number was dialed incorrectly. SW provided correct number to [facility].  Patient to reach out for admission at his earliest convenience.     Patient acknowledges he is having a "great day" on this date. Patient reports he stayed with his brother overnight. Patient notes he can stay with his brother as long as he wishes.  Patient adds he informed his spouse of his desire to seek psychiatric treatment who responded well to this information.     Patient requested more frequent visits with this clinic.  SW noted she would inform MD and MA would reach out with next scheduled appointment reminder.  Patient denies further concerns at this time. SW remains available to provide emotional support and psychosocial assistance.    Sade Duncan, SHAUN  Outpatient   Palliative Medicine    "

## 2022-10-12 NOTE — TELEPHONE ENCOUNTER
Specialty Pharmacy - Clinical Reassessment    Specialty Medication Orders Linked to Encounter      Flowsheet Row Most Recent Value   Medication #1 alpelisib 300 mg/day (150 mg x 2) Tab (Order#[id number], Rx#[id number])          Patient Diagnosis   C50.021, Z17.1 - Malignant neoplasm involving both nipple and areola of right breast in male, estrogen receptor negative  C79.51 - Bone metastases    Paul Li Jr. is a 46 y.o. male, who is followed by the specialty pharmacy service for management and education of his Piqray.  He has been on therapy with Piqray + abraxane since 8/22/2021.   I have reviewed his electronic medical record and current medication list and determined that specialty medication adjustment Is not needed at this time.    Patient has not experienced adverse events.  He Is adherent reporting 0 missed doses since last review.  Adherence has been encouraged with the following mechanism(s): directed education.  He is meeting goals of therapy and will continue treatment.        10/4/2022     9:26 AM 9/9/2022    10:29 AM 8/10/2022     1:53 PM 7/11/2022     2:24 PM 6/13/2022    11:54 AM 5/13/2022     3:21 PM 4/22/2022     4:42 PM   Follow Up Review   # of missed doses 0 0 0 1 0 1    New Medications? No No No No No No    New Conditions? No No No No No No    New Allergies? No No No No No No    Med Effective? Very good Good Good Good Good Good    Missed activities?       No   Urgent Care? No No No No No No    Requested Pharmacist? No No No No No No          Therapy is appropriate to continue.    Therapy is effective: Yes  On scale of 1 to 10, how does patient rank quality of life? (10 - Best): Unable to Assess  Recommendations: none at this time.  Review Method: Chart Review    Patient last seen on 10/6: MD noted stable disease. Patient should continue current therapy. Reviewed 10/06 labs: CMP and CBC stable, no adjustments needed.     Tasks added this encounter   No tasks added.   Tasks due within next 3  months   10/12/2022 - Clinical - Follow Up Assesement (180 day)  11/1/2022 - Refill Call (Auto Added)     MARLENE BECK, PharmD  [facility]  [address]  [facility] [address]  Phone: [phone]  Fax: [phone]

## 2022-10-12 NOTE — TELEPHONE ENCOUNTER
----- Message from Zena Liz RN sent at 10/12/2022  3:52 PM CDT -----  Pt is being scheduled for urology surgery -- Hydrocele repair - Dr. Cordero is requesting a clearance your service to proceed with surgery  (10/28)

## 2022-10-13 ENCOUNTER — CLINICAL SUPPORT (OUTPATIENT)
Dept: REHABILITATION | Facility: HOSPITAL | Age: 47
End: 2022-10-13
Attending: INTERNAL MEDICINE
Payer: MEDICARE

## 2022-10-13 ENCOUNTER — INFUSION (OUTPATIENT)
Dept: INFUSION THERAPY | Facility: HOSPITAL | Age: 47
End: 2022-10-13
Attending: INTERNAL MEDICINE
Payer: MEDICARE

## 2022-10-13 VITALS
DIASTOLIC BLOOD PRESSURE: 59 MMHG | HEART RATE: 62 BPM | OXYGEN SATURATION: 97 % | SYSTOLIC BLOOD PRESSURE: 119 MMHG | RESPIRATION RATE: 18 BRPM | TEMPERATURE: 98 F

## 2022-10-13 DIAGNOSIS — Z17.1 MALIGNANT NEOPLASM INVOLVING BOTH NIPPLE AND AREOLA OF RIGHT BREAST IN MALE, ESTROGEN RECEPTOR NEGATIVE: Primary | ICD-10-CM

## 2022-10-13 DIAGNOSIS — I89.0 LYMPHEDEMA OF RIGHT ARM: Primary | ICD-10-CM

## 2022-10-13 DIAGNOSIS — R29.3 POOR POSTURE: ICD-10-CM

## 2022-10-13 DIAGNOSIS — Z74.09 IMPAIRED FUNCTIONAL MOBILITY AND ACTIVITY TOLERANCE: ICD-10-CM

## 2022-10-13 DIAGNOSIS — C50.021 MALIGNANT NEOPLASM INVOLVING BOTH NIPPLE AND AREOLA OF RIGHT BREAST IN MALE, ESTROGEN RECEPTOR NEGATIVE: Primary | ICD-10-CM

## 2022-10-13 PROCEDURE — 97110 THERAPEUTIC EXERCISES: CPT

## 2022-10-13 PROCEDURE — 96413 CHEMO IV INFUSION 1 HR: CPT

## 2022-10-13 PROCEDURE — A4216 STERILE WATER/SALINE, 10 ML: HCPCS | Performed by: NURSE PRACTITIONER

## 2022-10-13 PROCEDURE — 25000003 PHARM REV CODE 250: Performed by: NURSE PRACTITIONER

## 2022-10-13 PROCEDURE — 97140 MANUAL THERAPY 1/> REGIONS: CPT

## 2022-10-13 PROCEDURE — 63600175 PHARM REV CODE 636 W HCPCS: Mod: JG | Performed by: NURSE PRACTITIONER

## 2022-10-13 RX ORDER — HYDROCHLOROTHIAZIDE 25 MG/1
TABLET ORAL
Qty: 90 TABLET | Refills: 3 | Status: SHIPPED | OUTPATIENT
Start: 2022-10-13 | End: 2023-11-02 | Stop reason: SDUPTHER

## 2022-10-13 RX ORDER — SODIUM CHLORIDE 9 MG/ML
INJECTION, SOLUTION INTRAVENOUS CONTINUOUS
Status: DISCONTINUED | OUTPATIENT
Start: 2022-10-13 | End: 2022-10-13 | Stop reason: HOSPADM

## 2022-10-13 RX ORDER — HEPARIN 100 UNIT/ML
500 SYRINGE INTRAVENOUS
Status: CANCELLED | OUTPATIENT
Start: 2022-10-13

## 2022-10-13 RX ORDER — HEPARIN 100 UNIT/ML
500 SYRINGE INTRAVENOUS
Status: DISCONTINUED | OUTPATIENT
Start: 2022-10-13 | End: 2022-10-13 | Stop reason: HOSPADM

## 2022-10-13 RX ORDER — LOSARTAN POTASSIUM 50 MG/1
TABLET ORAL
Qty: 180 TABLET | Refills: 3 | Status: SHIPPED | OUTPATIENT
Start: 2022-10-13 | End: 2023-11-09 | Stop reason: SDUPTHER

## 2022-10-13 RX ORDER — SODIUM CHLORIDE 0.9 % (FLUSH) 0.9 %
10 SYRINGE (ML) INJECTION
Status: DISCONTINUED | OUTPATIENT
Start: 2022-10-13 | End: 2022-10-13 | Stop reason: HOSPADM

## 2022-10-13 RX ORDER — SODIUM CHLORIDE 9 MG/ML
INJECTION, SOLUTION INTRAVENOUS CONTINUOUS
Status: CANCELLED | OUTPATIENT
Start: 2022-10-13

## 2022-10-13 RX ORDER — SODIUM CHLORIDE 0.9 % (FLUSH) 0.9 %
10 SYRINGE (ML) INJECTION
Status: CANCELLED | OUTPATIENT
Start: 2022-10-13

## 2022-10-13 RX ADMIN — SODIUM CHLORIDE: 9 INJECTION, SOLUTION INTRAVENOUS at 11:10

## 2022-10-13 RX ADMIN — HEPARIN 500 UNITS: 100 SYRINGE at 01:10

## 2022-10-13 RX ADMIN — SODIUM CHLORIDE: 0.9 INJECTION, SOLUTION INTRAVENOUS at 11:10

## 2022-10-13 RX ADMIN — PACLITAXEL 220 MG: 100 INJECTION, POWDER, LYOPHILIZED, FOR SUSPENSION INTRAVENOUS at 11:10

## 2022-10-13 RX ADMIN — Medication 10 ML: at 01:10

## 2022-10-13 NOTE — PROGRESS NOTES
Physical Therapy Daily Treatment Note       Date: 10/13/2022   Name: Paul Li Jr.  Clinic Number: [id number]    Therapy Diagnosis:   Encounter Diagnoses   Name Primary?    Lymphedema of right arm Yes    Impaired functional mobility and activity tolerance     Poor posture      Physician: Rosa Linn MD    Physician Orders: PT Eval and Treat -RUE lymphedema  Medical Diagnosis: Malignant neoplasm involving both nipple and areola of right breast in male, estrogen receptor negative [C50.021, Z17.1], Lymphedema of right upper extremity   Evaluation Date: 10/10/2022  Authorization Period Expiration: 10/14/22  Plan of Care Certification Period: 1/6/23 (12 weeks)  Visit # / Visits authorized: 1/ 11 (plus evaluation)  Insurance: [employer]  FOTO: 1/3     Time In: 2:05 PM  Time Out: 2:58 PM  Total Billable Time: 53 minutes     Precautions: Standard, Fall, cancer, and Spine, Bony Mets, hx of Seizures, L chest wall port      Subjective     Pt reports: He is tired because it's been a long day. Endorses some soreness by left mid-back below shoulder aileen   He was compliant with self manual lymph drainage  Response to previous treatment: his Right hand is smaller  Pain Scale: Paul rates pain on a scale of 0/10 on VAS.   Pain location: N/A     Fatigue: tired from full day at [facility]  Functional change: none stated  Treatment:   Chemotherapy: te-adjuvant chemo therapy completed 10/19  Pt is currently on chemotherapy: pt is getting 1 infusion/week for 3 weeks with 1 week break, and he takes oral chemotherapy daily.  Radiation: Completed in February 2020  Surgery date: 12/17/19 pt underwent right axillary lymph node dissection and resection excess lateral tissue; 11/22/19 right simple mastectomy with SLNB; total of 18 lymph nodes removed      Objective     Objective Measures updated at progress report unless specified.     Pt's right hand noted to be  smaller    Treatment     Paul received individual therapeutic exercises to improve postural correction and alignment, stretching and soft tissue mobility, and strengthening for 8 minutes including the following:     Diaphragmatic breathing, 2 x 5 reps  Scapular retractions, 10 reps  Shoulder rolls, 10 reps each fwd and back.    Paul received the following manual therapy techniques were performed to increased myofascial/soft tissue length, mobility and pliability, increase PROM, AROM and function as well as to decrease pain for 45 minutes      Short Neck- held due to history of seizures  Clear Healthy Lymph Nodes:  Left Axilla                                                  Right Groin  Open Anastomoses:  Anterior Axillo-Axillary  (AAA)                                    Axillo-Inguinal     (AI)                                    Posterior Axillo-Axillary  (GEREMIAS) -not performed today     Right Upper Arm (Lateral and Medial)  Right  Antecubital Fossa  Right Dorsal Forearm  Right Volar Forearm  Dorsum Right Hand  Right fingers     Rework Right UE  Rework Anastomoses  Rework Healthy lymph Nodes    Pt was provided with section of size F tubigrip for him to try on his lower right arm after bandages are removed. Pt is familiar with wearing schedule for tubigrip.    PT applies  gauze to right fingers and hand and stockinette, cotton artiflex, and short stretch bandages to pt's RIGHT/LEFT/BILATERAL: right upper extremity. Pt educated to wear bandaging for next 2-3 days unless it causes pain, numbness, or changes in skin color/temperature, or if they start to fall down.  If removed, pt instructed to roll up bandages and cotton padding and bring to next session. If bandages are removed, pt can wear his tubigrip.  Pt provided with Vet glove to cover bandages in the shower and directions on how to care for bandages. Pt verbalizes understanding of all topics.            Home Exercise Program and Patient Education   Education  provided re:  - role of PT in multi - disciplinary team, goals for PT  - progress towards goals   - purpose of bandaging    Written Home Exercises Provided: Patient instructed to cont prior HEP.  Exercises were reviewed and Paul was able to demonstrate them prior to the end of the session.  Paul demonstrated good  understanding of the education provided.     See EMR under Media for self lymphatic drainage provided prior visit.    Pt has no cultural, educational or language barriers to learning provided.    Assessment     Patient is responding well to physical therapy. Pt has been cleared by referring physician to receive complete decongestive therapy. He has been compliant with self manual lymph drainage and already demo's reduction in lymphedema in R hand. Pt received complete decongestive therapy today without issue in clinic. Will gauge response at next session and progress as tolerated.      Pt prognosis is Good. Pt will continue to benefit from skilled outpatient physical therapy to address the deficits listed in the problem list chart on initial evaluation, provide pt/family education and to maximize pt's level of independence in the home and community environment.     Anticipated barriers to physical therapy: advanced disease    Goals as follows:  Short Term Goals (6 weeks)  1. Pt will demo decrease in girth in right upper extremity by >/= 2cm to allow for improved UE symmetry, clothing choice, ROM, and UE function. (progressing, not met)  2. Patient will demonstrate 100% knowledge of lymphedema precautions and signs of infection to allow for reduced risk of lymphedema, reduced risk of infection, and/or exacerbation.  (progressing, not met)  3. Patient will perform self lymph drainage techniques to enhance lymphatic drainage and mobility.  (progressing, not met)  4. Patient will tolerate daily activities with multilayered bandaging to enhance lymphatic drainage and skin elasticity.  (progressing, not met)  5.  Pt will tolerate HEP for improved strength, functional mobility, ROM, posture, and endurance. (progressing, not met)        Long Term Goals: ( 12  weeks)  1. Patient to show decreased girth in right upper extremity by >/= 3cm to allow for improved UE symmetry, clothing choice, ROM, and UE function.  (progressing, not met)  2. Patient will show reduction in density to mild or less with improved contour of limb to allow for improved cosmesis, improved lymphatic drainage, and functional mobility.  (progressing, not met)  3. Patient to bruna/doff compression garment with daily compliance to support lymphatic mobility and skin elasticity.  (progressing, not met)  4. Pt to show improved postural awareness and alignment for improved functional mobility.  (progressing, not met)  5. Pt to be independent and compliant with HEP to allow for improved ROM, reach and carry with functional endurance and strength.    (progressing, not met)           Plan   Outpatient physical therapy 2x week for 12 weeks to include the following:   Manual Therapy, Neuromuscular Re-ed, Orthotic Management and Training, Patient Education, Self Care, Therapeutic Activities, and Therapeutic Exercise.     Plan of care Certification Period: 10/10/2022 to 1/6/23.       Therapist: Katelynn Harrell, PT  10/13/2022

## 2022-10-13 NOTE — PLAN OF CARE
1300-Pt tolerated Abraxane infusion and IVF's well, no complications or side effects, POC and meds discussed with pt, pt aware of upcoming appts, pt knows to call MD with any questions or concerns. Pt ambulated off unit, no distress noted.

## 2022-10-17 ENCOUNTER — CLINICAL SUPPORT (OUTPATIENT)
Dept: REHABILITATION | Facility: HOSPITAL | Age: 47
End: 2022-10-17
Payer: MEDICARE

## 2022-10-17 ENCOUNTER — PATIENT MESSAGE (OUTPATIENT)
Dept: UROLOGY | Facility: CLINIC | Age: 47
End: 2022-10-17
Payer: MEDICARE

## 2022-10-17 ENCOUNTER — HOSPITAL ENCOUNTER (OUTPATIENT)
Dept: PSYCHIATRY | Facility: HOSPITAL | Age: 47
Discharge: HOME | End: 2022-10-17
Attending: PSYCHIATRY & NEUROLOGY | Admitting: PSYCHIATRY & NEUROLOGY
Payer: MEDICARE

## 2022-10-17 VITALS
DIASTOLIC BLOOD PRESSURE: 66 MMHG | RESPIRATION RATE: 18 BRPM | TEMPERATURE: 97 F | SYSTOLIC BLOOD PRESSURE: 138 MMHG | HEART RATE: 67 BPM

## 2022-10-17 DIAGNOSIS — I89.0 LYMPHEDEMA OF RIGHT ARM: Primary | ICD-10-CM

## 2022-10-17 DIAGNOSIS — F43.23 ADJUSTMENT DISORDER WITH MIXED ANXIETY AND DEPRESSED MOOD: Primary | ICD-10-CM

## 2022-10-17 DIAGNOSIS — Z79.899 LONG-TERM USE OF HIGH-RISK MEDICATION: ICD-10-CM

## 2022-10-17 DIAGNOSIS — R29.3 POOR POSTURE: ICD-10-CM

## 2022-10-17 DIAGNOSIS — Z74.09 IMPAIRED FUNCTIONAL MOBILITY AND ACTIVITY TOLERANCE: ICD-10-CM

## 2022-10-17 LAB
AMPHET+METHAMPHET UR QL: NEGATIVE
BARBITURATES UR QL SCN>200 NG/ML: NEGATIVE
BENZODIAZ UR QL SCN>200 NG/ML: NEGATIVE
BZE UR QL SCN: NEGATIVE
CANNABINOIDS UR QL SCN: ABNORMAL
CREAT UR-MCNC: 109 MG/DL (ref 23–375)
ETHANOL UR-MCNC: <10 MG/DL
METHADONE UR QL SCN>300 NG/ML: NEGATIVE
OPIATES UR QL SCN: NEGATIVE
PCP UR QL SCN>25 NG/ML: NEGATIVE
TOXICOLOGY INFORMATION: ABNORMAL

## 2022-10-17 PROCEDURE — 97140 MANUAL THERAPY 1/> REGIONS: CPT

## 2022-10-17 PROCEDURE — 90853 PR GROUP PSYCHOTHERAPY: ICD-10-PCS | Mod: 59,,, | Performed by: PSYCHOLOGIST

## 2022-10-17 PROCEDURE — 80349 CANNABINOIDS NATURAL: CPT | Performed by: PSYCHIATRY & NEUROLOGY

## 2022-10-17 PROCEDURE — 99222 PR INITIAL HOSPITAL CARE,LEVL II: ICD-10-PCS | Mod: ,,, | Performed by: PSYCHIATRY & NEUROLOGY

## 2022-10-17 PROCEDURE — 90853 GROUP PSYCHOTHERAPY: CPT | Mod: ,,, | Performed by: PSYCHOLOGIST

## 2022-10-17 PROCEDURE — 99222 1ST HOSP IP/OBS MODERATE 55: CPT | Mod: ,,, | Performed by: PSYCHIATRY & NEUROLOGY

## 2022-10-17 PROCEDURE — 80307 DRUG TEST PRSMV CHEM ANLYZR: CPT | Performed by: PSYCHIATRY & NEUROLOGY

## 2022-10-17 PROCEDURE — 97110 THERAPEUTIC EXERCISES: CPT

## 2022-10-17 PROCEDURE — 90853 GROUP PSYCHOTHERAPY: CPT

## 2022-10-17 NOTE — PROGRESS NOTES
Group Psychotherapy (PhD/LCSW)    Site: [facility]    Clinical status of patient: Intensive Outpatient Program (IOP)    Date: 10/17/2022    Group Focus: Mindfulness Skills    Length of service: 45-50 minutes    Number of patients in attendance: 3    Referred by: [facility] Treatment Team    Target symptoms: Depression and Anxiety    Patient's response to treatment: Active Listening and Self-disclosure    Progress toward goals: Progressing adequately    Interval History: Session focus was Mindfulness. Session began with mindful aroma exercise. Patients were introduced to what is mindfulness, the goals of mindfulness and how to practice it. Wise mind was introduced and patients engaged in [place] mindfulness exercise.    Diagnosis:     ICD-10-CM ICD-9-CM   1. Adjustment disorder with mixed anxiety and depressed mood  F43.23 309.28       Plan: Continue treatment on [facility]

## 2022-10-17 NOTE — ANESTHESIA PAT ROS NOTE
10/17/2022  Paul Li Jr. is a 47 y.o., male.      Pre-op Assessment          Review of Systems  Anesthesia Hx:  No problems with previous Anesthesia   History of prior surgery of interest to airway management or planning:  Previous anesthesia: General 2021 port placed on left with general anesthesia.           Social:  Social Alcohol Use, Smoker Vapes -- instructed to stop before surg      Hematology/Oncology:       -- Anemia:               Hematology Comments: Microcytic anemia  Leukopenia  Chemotherapy induced neutropenia      Current/Recent Cancer.  Breast       chemotherapy   Oncology Comments: Malignant neoplasm involving nipple and areola of rt. Breast  Bone metastasios     Cardiovascular:  Exercise tolerance: good   Hypertension                                        Pulmonary:        Sleep Apnea States lost 100 lbs // doesn't feel needs c-pap  // doesn't notice difference               Renal/:     hydrocele             Hepatic/GI:        Hx of hyperbilirubinemia  hx          Musculoskeletal:     ? Gout  States does not have arthritis              Neurological:       Seizures    Hx of seizures // none since 19      Peripheral Neuropathy                          Endocrine:  Diabetes, type 2, using insulin   Takes trulicity weekly on Sunday  but takes levemir and humalog as needs  States blood sugars run 120s-160s        Dermatological:  Lymphedema rt arm   Psych:  Psychiatric History anxiety depression insomnia                  Anesthesia Assessment: Preoperative EQUATION    Planned Procedure: Procedure(s) (LRB):  HYDROCELECTOMY (Right)  Requested Anesthesia Type:General  Surgeon: Anthony Cordero Jr., MD  Service: Urology  Known or anticipated Date of Surgery:10/28/2022    Surgeon notes: reviewed    Electronic QUestionnaire Assessment completed via nurse interview with patient.        Triage  considerations:     The patient has no apparent active cardiac condition (No unstable coronary Syndrome such as severe unstable angina or recent [<1 month] myocardial infarction, decompensated CHF, severe valvular   disease or significant arrhythmia)    Previous anesthesia records:MICHELLE  9/17/21    Last PCP note:  about to change pcp // cleared by hematology // oncology  Subspecialty notes: Endocrinology, Hematology/Oncology, PM&R, Psychiatry, Urology, sleep medicine    Other important co-morbidities: DM2, HLD, HTN, and NEHAL      Diagnosis Date          Breast cancer      Cancer       R breast    Diabetes mellitus      HTN (hypertension)      Leg edema, right            Seizures       at age 18 - stress related    Therapy       marital counseling     Tests already available:  Available tests,  within 1 month , within [facility] .             Instructions given. (See in Nurse's note)    Optimization:  Pt cleared by Dr. Linn hem /onc  Has a port currently on chemo -- 3 weeks on // one week off      Plan:    Testing:  None Needed   Pre-anesthesia  visit       Visit focus:  denies any issues with anesthesia     Consultation: hem / onc         Navigation:                  Straight Line to surgery.               No tests, anesthesia preop clinic visit, or consult required.

## 2022-10-17 NOTE — PROGRESS NOTES
Group Psychotherapy (PhD/LCSW)    Site: [facility] ([place], [place])    Clinical status of patient: Intensive Outpatient Program (IOP)    Date: 10/17/2022    Group Focus: Intro to Emotions    Length of service: 17228 - 45-50 minutes    Number of patients in attendance: 7    Referred by: [facility]    Target symptoms: Depression, anxiety    Patient's response to treatment: Active Listening and Self-disclosure    Progress toward goals: Progressing adequately    Interval History:  Session focus was introduction to emotions and the three-component model of emotions. Patients were educated on the purpose of emotions, what each common emotion alerts us to, and that each emotion exists on its own separate continuum. The three components of emotions (behavior, thought, physical sensations) and skills used to address each component were reviewed.    Diagnosis:     ICD-10-CM ICD-9-CM   1. Adjustment disorder with mixed anxiety and depressed mood  F43.23 309.28       Plan: Continue treatment on [facility]
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
## 2022-10-17 NOTE — PROGRESS NOTES
Group Psychotherapy (PhD/LCSW)     Site: [facility]     Clinical status of patient: Intensive Outpatient Program (IOP)     Date:10/17/2022    Group Focus: Psychodynamic Process Group     Length of service: 24380 - 50 minutes     Number of patients in attendance: 3     Referred by: [facility] Treatment Team     Target symptoms: Mood Disorder     Patient's response to treatment: Active Listening and Self-disclosure     Progress toward goals: Progressing appropriately     Interval History: Patient introduced himself to the group.     Diagnosis:     ICD-10-CM ICD-9-CM   1. Adjustment disorder with mixed anxiety and depressed mood  F43.23 309.28   2. Long-term use of high-risk medication  Z79.899 V58.69         Plan: Continue [facility] program

## 2022-10-17 NOTE — TREATMENT PLAN
"[facility]  BEHAVIORAL MEDICINE UNIT  INTERDISCIPLINARY TREATMENT PLAN  INTENSIVE OUTPATIENT PROGRAM    INTERDISCIPLINARY  TREATMENT TEAM:    Saira Warner M.D., Psychiatrist      Sneha Lala, Ph.D., Clinical Psychologist    Kareem Bradshaw LPN, Licensed Practical Nurse    Marely Chery, [facility] Licensed Master Social Worker    LUCRECIA Moeller, Registered       Resident:Jose Rafael Ross MD, Resident     (Signatures scanned into record separately).      ESTIMATED LOS:  2 weeks      The patient has reviewed the treatment plan with staff and has signed the "Patient Responsibilities" form.    (Patient signature scanned into record separately).      TREATMENT PLAN    DIAGNOSIS    Major depressive disorder, recurrent, moderate  Unspecified anxiety disorder         Patient Education Needs/Barriers to Learning (i.e., Language, Reading, Comprehension): None         Support/Advocacy Services/Needs (i.e., Financial, Transportation, Medications): None         Community Resources (i.e., Alcoholics Anonymous, Al Anon): None     Patients Identified Goals:  To deal with stress and anxiety better   2. To get out of a depressed mind      Coping Skills:  Prayer  2. Talking to siblings   3. Take a ride or walk alone somewhere    Strengths:  I care about others  2. I love being around family   3. Being a father to my sons    Limitations:  1.  2.  3.  4.      Goals and Objectives:  1. Goal: Attend and participate in all groups   Objective measure: Progress notes indicating active listening,    self-disclosure, feedback   Time frame to reach goal: Each day    2. Goal: Medication management   Objective measure: Physician progress note indicating improved medication status   Time frame to reach goal: By discharge    3. Goal: Reduce depression   Objective measure: Physician progress note indicating depression is improved   Time frame to reach goal: By discharge    4.  Goal: Reduce " anxiety   Objective measure: Physician progress note indicating anxiety is improved   Time frame to reach goal: By discharge    5. Goal: Develop stress coping skills   Objective measure: Patient self-report of improved coping   Time frame to reach goal: By discharge      Group Interventions:    Psychodynamic Group Psychotherapy - 1 hour, 5 times per week  Goals: 1. Utilize group empathy and support for problem solving; 2. Apply stress management, communication, and assertiveness skills to personal issues; 3. Discuss mental health symptoms and explore strategies for coping; 4. Discuss ways to change lifestyle to maintain better emotional health.    Communication Skills - 1 hour, 2 times per week  Goals: 1. Learn rules of effective communication; 2. Improve listening skills; 3. Practice clear communication.    Nutrition and Health - 1 hour, 1 time per week  Goals: 1. Explore impact that nutrition has on health; 2. Identify positive nutritional choices; 3. Explore how nutrition relates to stress tolerance.    Personal Growth - 1 hour, 2 times per week  Goals: 1. Discuss the development of self; 2. Create personal awareness and insight; 3. Explore a variety of psycho-educational techniques.    Promoting Healthy Lifestyles - 1 hour, 1 time per week  Goals: 1. Understand the Biopsychosocial Model of Health; 2. Develop insight into how mental health can impact other dimensions of health; 3. Develop appropriate health promotion strategies.    Rational Emotive Therapy (RET) - 1 hour, 1 time per week  Goals: 1. Learn an action-oriented psychotherapy called RET; 2. Focus on identifying, challenging, and replacing self-defeating thoughts and beliefs; 3. Explore how healthier thinking can lead to healthier emotional well-being.    Relationship Dynamics - 1 hour, 1 time per week  Goals: 1. Learn about factors that shape relationships; 2. Understand the central role of relationships in personal well-being; 3. Learn how to improve  all relationships.    Relaxation Training - 1 hour, 3 times per week  Goals: 1. Learn about and implement various techniques for releasing physical tension from the body.    Spirituality - 1 hour, 1 time per week  Goals: 1. Discuss and reflect on the process of seeking peace and comfort; 2. Identify healthy ways of exploring spirituality.    Stress Management - 1 hour, 4 times per week  Goals: 1. Identify types and levels of stress; 2. Identify and change maladaptive beliefs and behaviors; 3. Identify and practice techniques of stress management.

## 2022-10-17 NOTE — PRE-PROCEDURE INSTRUCTIONS
Hydrocele  Refer to urology  Addendum: He is cleared for repair if needed. Will need to coordinate holding treatment.   Route Chart for Scheduling   Ludmila GARDUNO
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
## 2022-10-17 NOTE — PROGRESS NOTES
Physical Therapy Daily Treatment Note       Date: 10/17/2022   Name: Paul Li Jr.  Clinic Number: [id number]    Therapy Diagnosis:   Encounter Diagnoses   Name Primary?    Lymphedema of right arm Yes    Impaired functional mobility and activity tolerance     Poor posture      Physician: Rosa Linn MD    Physician Orders: PT Eval and Treat -RUE lymphedema  Medical Diagnosis: Malignant neoplasm involving both nipple and areola of right breast in male, estrogen receptor negative [C50.021, Z17.1], Lymphedema of right upper extremity   Evaluation Date: 10/10/2022  Authorization Period Expiration: 10/14/22  Plan of Care Certification Period: 1/6/23 (12 weeks)  Visit # / Visits authorized: 2/ 11 (plus evaluation)  Insurance: [employer]  FOTO: 1/3     Time In: 3:17 PM  Time Out: 4:20 PM  Total Billable Time: 63 minutes     Precautions: Standard, Fall, cancer, and Spine, Bony Mets, hx of Seizures, L chest wall port      Subjective     Pt reports: Badages lasted until Saturday evening and they started falling down. He has been wearing sports compression sleeve.  He was compliant with self manual lymph drainage  Response to previous treatment: his Right hand is smaller  Pain Scale: Paul rates pain on a scale of 0/10 on VAS.   Pain location: N/A     Fatigue: tired from full day at [facility]  Functional change: none stated  Treatment:   Chemotherapy: te-adjuvant chemo therapy completed 10/19  Pt is currently on chemotherapy: pt is getting 1 infusion/week for 3 weeks with 1 week break, and he takes oral chemotherapy daily.  Radiation: Completed in February 2020  Surgery date: 12/17/19 pt underwent right axillary lymph node dissection and resection excess lateral tissue; 11/22/19 right simple mastectomy with SLNB; total of 18 lymph nodes removed      Objective     Objective Measures updated at progress report unless specified.     LANDMARK RIGHT UE LEFT  "UE DIFF RUE Diff   Date Eval Eval Eval 10/17/22 10/17/22   E + 8" 50 cm 46.5 cm 3.5 cm 48 cm -2   E + 6" 45.5 cm 44 cm 1.5 cm 45 cm -0.5   E + 4" 40 cm 40.5 cm 0.5 cm 40.5 cm +0.5   E + 2" 38.5 cm 38.5 cm 0 cm 39 cm +0.5   Elbow 36.5 cm 34 cm 2.5 cm 35.5 cm -1   W+ 8" 37.5cm 33 cm 4.5 cm 38cm +0.5   W +  6" 35 cm 29.5 cm 5.5 cm 34 cm -1   W + 4" 31.5 cm 25 cm 6.5 cm 31.5 cm No chg   Wrist 23 cm 20 cm 3 cm 22.5cm -0.5   DPC 28.5 cm 26 cm 2.5 cm 28 cm -0.5   IP Thumb 8.5 cm 8 cm 0.5 cm 8.5 cm No chg      Pt's sports compression sleeve appears too short and too tight in upper arm    Treatment     Paul received individual therapeutic exercises to improve postural correction and alignment, stretching and soft tissue mobility, and strengthening for 8 minutes including the following:     Diaphragmatic breathing, 2 x 5 reps  Scapular retractions, 10 reps  Shoulder rolls, 10 reps each fwd and back.    Paul received the following manual therapy techniques were performed to increased myofascial/soft tissue length, mobility and pliability, increase PROM, AROM and function as well as to decrease pain for 55 minutes    Measurements taken above    Short Neck- held due to history of seizures  Clear Healthy Lymph Nodes:  Left Axilla                                                  Right Groin  Open Anastomoses:  Anterior Axillo-Axillary  (AAA)                                    Axillo-Inguinal     (AI)                                    Posterior Axillo-Axillary  (GEREMIAS)      Right Upper Arm (Lateral and Medial)  Right  Antecubital Fossa  Right Dorsal Forearm  Right Volar Forearm  Dorsum Right Hand  Right fingers     Rework Right UE  Rework Anastomoses  Rework Healthy lymph Nodes    PT applies  gauze to right fingers and hand and stockinette, cotton artiflex, and short stretch bandages to pt's RIGHT/LEFT/BILATERAL: right upper extremity. Pt educated to wear bandaging for next 2-3 days unless it causes pain, numbness, or changes in " skin color/temperature, or if they start to fall down.  If removed, pt instructed to roll up bandages and cotton padding and bring to next session. If bandages are removed, pt can wear his tubigrip.  Pt has Vet glove to cover bandages in the shower and directions on how to care for bandages. Pt verbalizes understanding of all topics.            Home Exercise Program and Patient Education   Education provided re:  - role of PT in multi - disciplinary team, goals for PT  - progress towards goals   - importance of wearing proper fitting compression  -that massage gun is not appropriate for lymphedema    Written Home Exercises Provided: Patient instructed to cont prior HEP.  Exercises were reviewed and Paul was able to demonstrate them prior to the end of the session.  Paul demonstrated good  understanding of the education provided.     See EMR under Media for self lymphatic drainage provided prior visit.    Pt has no cultural, educational or language barriers to learning provided.    Assessment     Patient is responding well to physical therapy. Pt has been cleared by referring physician to receive complete decongestive therapy. He has been compliant with self manual lymph drainage and wearing compression bandages/tubigrip. Overall, pt with reduction in right arm circumference. Pt received complete decongestive therapy today without issue in clinic. Will gauge response at next session and progress as tolerated.      Pt prognosis is Good. Pt will continue to benefit from skilled outpatient physical therapy to address the deficits listed in the problem list chart on initial evaluation, provide pt/family education and to maximize pt's level of independence in the home and community environment.     Anticipated barriers to physical therapy: advanced disease    Goals as follows:  Short Term Goals (6 weeks)  1. Pt will demo decrease in girth in right upper extremity by >/= 2cm to allow for improved UE symmetry, clothing  choice, ROM, and UE function. (progressing, not met)  2. Patient will demonstrate 100% knowledge of lymphedema precautions and signs of infection to allow for reduced risk of lymphedema, reduced risk of infection, and/or exacerbation.  (progressing, not met)  3. Patient will perform self lymph drainage techniques to enhance lymphatic drainage and mobility.  (progressing, not met)  4. Patient will tolerate daily activities with multilayered bandaging to enhance lymphatic drainage and skin elasticity.  (progressing, not met)  5. Pt will tolerate HEP for improved strength, functional mobility, ROM, posture, and endurance. (progressing, not met)        Long Term Goals: ( 12  weeks)  1. Patient to show decreased girth in right upper extremity by >/= 3cm to allow for improved UE symmetry, clothing choice, ROM, and UE function.  (progressing, not met)  2. Patient will show reduction in density to mild or less with improved contour of limb to allow for improved cosmesis, improved lymphatic drainage, and functional mobility.  (progressing, not met)  3. Patient to bruna/doff compression garment with daily compliance to support lymphatic mobility and skin elasticity.  (progressing, not met)  4. Pt to show improved postural awareness and alignment for improved functional mobility.  (progressing, not met)  5. Pt to be independent and compliant with HEP to allow for improved ROM, reach and carry with functional endurance and strength.    (progressing, not met)           Plan   Outpatient physical therapy 2x week for 12 weeks to include the following:   Manual Therapy, Neuromuscular Re-ed, Orthotic Management and Training, Patient Education, Self Care, Therapeutic Activities, and Therapeutic Exercise.     Plan of care Certification Period: 10/10/2022 to 1/6/23.       Therapist: Katelynn Harrell, PT  10/17/2022

## 2022-10-17 NOTE — PRE-PROCEDURE INSTRUCTIONS
Corinne Sheikh RN  to Me    LE    2:59 PM   Zena,   Please see the below, the clearance is in the chart , do you need anything further?     Corinne Keys NP  to Corinne Sheikh RN      1:32 PM   Corinne, He is cleared and I addended my last progress note. Can you fax to [facility] please.

## 2022-10-18 ENCOUNTER — HOSPITAL ENCOUNTER (OUTPATIENT)
Dept: PSYCHIATRY | Facility: HOSPITAL | Age: 47
Discharge: HOME | End: 2022-10-18
Payer: COMMERCIAL

## 2022-10-18 DIAGNOSIS — F43.23 ADJUSTMENT DISORDER WITH MIXED ANXIETY AND DEPRESSED MOOD: Primary | ICD-10-CM

## 2022-10-18 PROCEDURE — 90853 PR GROUP PSYCHOTHERAPY: ICD-10-PCS | Mod: ,,, | Performed by: PSYCHOLOGIST

## 2022-10-18 PROCEDURE — 90853 GROUP PSYCHOTHERAPY: CPT | Mod: ,,, | Performed by: PSYCHOLOGIST

## 2022-10-18 PROCEDURE — 90853 GROUP PSYCHOTHERAPY: CPT

## 2022-10-18 NOTE — PROGRESS NOTES
Group Psychotherapy (PhD/LCSW)    Site: [facility]    Clinical status of patient: Intensive Outpatient Program (IOP)    Date: 10/18/2022    Group Focus: Interpersonal Effectiveness Skills    Length of service: 45-50 minutes    Number of patients in attendance: 9    Referred by: [facility] Team    Target symptoms: Depression and Anxiety    Patient's response to treatment: Active Listening and Self-disclosure    Progress toward goals: Progressing adequately    Interval History: Session focus was Interpersonal Effectiveness. Patients were introduced to GIVE FAST. Patient engaged with how to be gentle, act interested, validate, use an easy manner, be fair,  no apologies, stick to values, and be truthful.    Diagnosis:     ICD-10-CM ICD-9-CM   1. Adjustment disorder with mixed anxiety and depressed mood  F43.23 309.28       Plan: Continue treatment on [facility]

## 2022-10-18 NOTE — PLAN OF CARE
10/18/22 1141   Activity/Group Therapy Checklist   Group Other (Comments)  (Communication Skills)   Attendance Attended   Follows Direction Followed directions   Group Interactions/Observations Interacted appropriately;Sharing;Supportive   Affect/Mood Range Normal range   Affect/Mood Display Appropriate   Goal Progression Progressing      and group reviewed boundary setting skills. Patient denied any questions or concerns at this time.

## 2022-10-18 NOTE — PROGRESS NOTES
Group Psychotherapy (PhD/LCSW)    Site: [facility]    Clinical status of patient: Intensive Outpatient Program (IOP)    Date: 10/18/2022    Group Focus: DBT Skills    Length of service: 45-50 minutes    Number of patients in attendance: 4    Referred by: [facility] Treatment Team    Target symptoms: Depression and Anxiety    Patient's response to treatment: Active Listening and Self-disclosure    Progress toward goals: Progressing adequately    Interval History: Session focus was reviewing Mindfulness and wise mind skills. Patients engaged in breath in wise out mind exercise and practice of asking wise mind a question.    Diagnosis:     ICD-10-CM ICD-9-CM   1. Adjustment disorder with mixed anxiety and depressed mood  F43.23 309.28         Plan: Continue treatment on [facility]

## 2022-10-19 ENCOUNTER — HOSPITAL ENCOUNTER (OUTPATIENT)
Dept: PSYCHIATRY | Facility: HOSPITAL | Age: 47
Discharge: HOME | End: 2022-10-19
Payer: COMMERCIAL

## 2022-10-19 ENCOUNTER — CLINICAL SUPPORT (OUTPATIENT)
Dept: REHABILITATION | Facility: HOSPITAL | Age: 47
End: 2022-10-19
Payer: MEDICARE

## 2022-10-19 VITALS
HEART RATE: 77 BPM | DIASTOLIC BLOOD PRESSURE: 69 MMHG | SYSTOLIC BLOOD PRESSURE: 134 MMHG | TEMPERATURE: 97 F | RESPIRATION RATE: 18 BRPM

## 2022-10-19 DIAGNOSIS — F43.23 ADJUSTMENT DISORDER WITH MIXED ANXIETY AND DEPRESSED MOOD: Primary | ICD-10-CM

## 2022-10-19 DIAGNOSIS — I89.0 LYMPHEDEMA OF RIGHT ARM: Primary | ICD-10-CM

## 2022-10-19 DIAGNOSIS — Z74.09 IMPAIRED FUNCTIONAL MOBILITY AND ACTIVITY TOLERANCE: ICD-10-CM

## 2022-10-19 DIAGNOSIS — R29.3 POOR POSTURE: ICD-10-CM

## 2022-10-19 PROCEDURE — 97140 MANUAL THERAPY 1/> REGIONS: CPT

## 2022-10-19 PROCEDURE — 90853 PR GROUP PSYCHOTHERAPY: ICD-10-PCS | Mod: ,,, | Performed by: PSYCHOLOGIST

## 2022-10-19 PROCEDURE — 90853 GROUP PSYCHOTHERAPY: CPT | Mod: ,,, | Performed by: PSYCHOLOGIST

## 2022-10-19 PROCEDURE — 90853 GROUP PSYCHOTHERAPY: CPT

## 2022-10-19 RX ORDER — TRAZODONE HYDROCHLORIDE 50 MG/1
50 TABLET ORAL NIGHTLY
Qty: 30 TABLET | Refills: 0 | Status: SHIPPED | OUTPATIENT
Start: 2022-10-19 | End: 2022-12-15

## 2022-10-19 RX ORDER — FLUOXETINE HYDROCHLORIDE 40 MG/1
80 CAPSULE ORAL DAILY
Qty: 60 CAPSULE | Refills: 0 | Status: SHIPPED | OUTPATIENT
Start: 2022-10-19 | End: 2022-10-27 | Stop reason: SDUPTHER

## 2022-10-19 NOTE — PATIENT CARE CONFERENCE
"Presenting Problem and History:    Paul Li Jr. is a 47 y.o. male with history of metastatic triple negative breast cancer, HTN, anxiety, and T2DM who is admitted to BMU for suicidal ideations. 4 weeks ago, he had an encounter with his wife who misrepresented his cancer prognosis in a conversation with his son. The wife then told Mr Li that "nobody loved him, nobody cared for him... like she does" which made him feel like he "shouldn't be here" and contemplated suicide with his pistol, states he grabbed the pistol and stood in the kitchen with it contemplating what he should do. He did not follow through with his SI because "something inside me stopped me." Denies any current SI/HI or since that week. Hx of SI when 16yoa 2/2 stressor (parents ), but denies hx SA.      He currently takes fluoxetine 60mg, and xanax as needed which he uses 1-2x/wk for sleep. Depression and anxiety diagnosed by his psychologist, Amber London, in August 2021; around the time when his cancer returned.  Endorses inability to fall asleep easily, lack of interest, guilt, poor concentration, and suicidal thoughts at that time.   Does also endorse excess anxiety/worry for past few weeks, with associated muscle tension, irritable/annoyed and frustrated easily, fatigued/drained throughout the day.      He's looking to "learn how to deal with this depression/anxiety thing" and "control his mood & temper."       Medications:    Current psych meds  1) 60mg Fluxoetine, xanax for depression/anxiety/sleep etc  Current Medication side effects:  mood is "heightened" whether happy or sad.  Current Medication compliance:  endorses compliance.    Diagnoses:    Major depressive disorder, recurrent, moderate  Unspecified anxiety disorder    Progress:    Patient was staffed by Team. Patient initiated program on 10/17. Patient has been cooperative and very supportive with the group members. Team will continue to monitor patient's " progress.     Plan of Care:    Patient will continue [facility] program.    Aftercare/Follow ups:  Med Management:   Psychotherapy: 11/1 at 3 PM Amber Sandoval     Staff present:  MD Sneha Leslie, PhD  Jose Rafael Ross MD Resident   Kareem Bradshaw, LPN  Marely Chery, [facility]  Anita Kathleen, W

## 2022-10-19 NOTE — PROGRESS NOTES
Group Psychotherapy (PhD/LCSW)     [place] [facility]     Clinical status of patient: Intensive Outpatient Program (IOP)     Date:10/18/22    Group Focus: Psychodynamic Process Group     Length of service: 23112 - 50 minutes     Number of patients in attendance: 4     Referred by: [facility] Treatment Team     Target symptoms: Mood Disorder     Patient's response to treatment: Active Listening and Self-disclosure     Progress toward goals: Progressing appropriately     Interval History: Patient discussed he had a nice birthday although he spent much of it here at the hospital. He enjoyed learning about mindfulness.      Diagnosis:     ICD-10-CM ICD-9-CM   1. Adjustment disorder with mixed anxiety and depressed mood  F43.23 309.28         Plan: Continue [facility] program

## 2022-10-19 NOTE — PSYCH
"[facility]  Psychosocial Assessment    Date: 10/19/2022  Time: 9:41 AM    Name: Paul Li Jr.    Age: 47 y.o.       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")    Employer/School: N/A    Medical/Psychiatric History   Past Psychiatric History:     Currently in treatment with Amber Sandoval (psychologist - [facility]) & Angeles Rodriguez (psychologist with [facility])    Medical Conditions/including prior head injury: See past medical history    Suicidal Ideation/Homicidal Ideation:      Current Medications:   Current Outpatient Medications:     alpelisib 300 mg/day (150 mg x 2) Tab, Take 2 tablets (300 mg) by mouth once daily., Disp: 60 tablet, Rfl: 3    ALPRAZolam (XANAX) 0.5 MG tablet, Take 1 tablet (0.5 mg total) by mouth 3 (three) times daily as needed for Insomnia or Anxiety., Disp: 60 tablet, Rfl: 0    amLODIPine (NORVASC) 10 MG tablet, TAKE 1 TABLET (10 MG) BY MOUTH EVERY DAY, Disp: 90 tablet, Rfl: 3    calcium-vitamin D3 (OYSTER SHELL CALCIUM-VIT D3) 500 mg-5 mcg (200 unit) per tablet, Take 1 tablet by mouth 2 (two) times daily., Disp: 180 tablet, Rfl: 3    diphenoxylate-atropine 2.5-0.025 mg (LOMOTIL) 2.5-0.025 mg per tablet, Take 1 tablet by mouth 4 (four) times daily as needed for Diarrhea., Disp: 60 tablet, Rfl: 2    dulaglutide (TRULICITY) 1.5 mg/0.5 mL pen injector, Inject 1.5 mg into the skin once a week., Disp: 4 pen, Rfl: 2    FLUoxetine 20 MG capsule, Take 2 capsules (40 mg total) by mouth once daily., Disp: 90 capsule, Rfl: 1    gabapentin (NEURONTIN) 300 MG capsule, Take 1 capsule (300 mg total) by mouth 3 (three) times " "daily., Disp: 90 capsule, Rfl: 11    hydroCHLOROthiazide (HYDRODIURIL) 25 MG tablet, TAKE 1 TABLET BY MOUTH ONCE DAILY, Disp: 90 tablet, Rfl: 3    HYDROcodone-acetaminophen (NORCO)  mg per tablet, Take 1 tablet by mouth every 6 (six) hours as needed for Pain., Disp: 90 tablet, Rfl: 0    insulin detemir U-100 (LEVEMIR FLEXTOUCH U-100 INSULN) 100 unit/mL (3 mL) InPn pen, Inject 21 Units into the skin every evening., Disp: 6 mL, Rfl: 2    insulin lispro (HUMALOG KWIKPEN INSULIN) 100 unit/mL pen, Inject 5 Units into the skin 3 (three) times daily., Disp: 6 mL, Rfl: 11    lancets 33 gauge Misc, 1 lancet by Misc.(Non-Drug; Combo Route) route once daily., Disp: 100 each, Rfl: 3    lancing device Misc, 1 Device by Misc.(Non-Drug; Combo Route) route 2 (two) times daily with meals., Disp: 1 each, Rfl: 0    LIDOcaine-prilocaine (EMLA) cream, Apply topically as needed., Disp: 30 g, Rfl: 0    losartan (COZAAR) 50 MG tablet, Take 2 tablets by mouth once daily, Disp: 180 tablet, Rfl: 3    metFORMIN (GLUCOPHAGE) 500 MG tablet, Take 2 tablets (1,000 mg total) by mouth 2 (two) times daily with meals. Needs appointment for future refills, Disp: 120 tablet, Rfl: 2    pen needle, diabetic (BD ULTRA-FINE TANESHA PEN NEEDLE) 32 gauge x 5/32" Ndle, Use as directed with novolog and levemir, Disp: 100 each, Rfl: 5    tadalafiL (CIALIS) 5 MG tablet, Take 2 tablets (10 mg total) by mouth daily as needed for Erectile Dysfunction., Disp: 20 tablet, Rfl: 1  No current facility-administered medications for this encounter.    Facility-Administered Medications Ordered in Other Encounters:     0.9%  NaCl infusion, , Intravenous, Continuous, Rosa Linn MD, Stopped at 11/19/21 1634    alteplase injection 2 mg, 2 mg, Intra-Catheter, PRN, Rosa Linn MD    heparin, porcine (PF) 100 unit/mL injection flush 500 Units, 500 Units, Intravenous, 1 time in Clinic/HOD, Richa Bahena MD    heparin, porcine (PF) 100 unit/mL injection flush 500 Units, " "500 Units, Intravenous, PRN, Rosa Linn MD    heparin, porcine (PF) 100 unit/mL injection flush 500 Units, 500 Units, Intravenous, PRN, Rosa Linn MD, 500 Units at 11/19/21 1635    sodium chloride 0.9% 250 mL flush bag, , Intravenous, 1 time in Clinic/HOD, Rosa Linn MD    sodium chloride 0.9% flush 10 mL, 10 mL, Intravenous, 1 time in Clinic/HOD, Richa Bahena MD    sodium chloride 0.9% flush 10 mL, 10 mL, Intravenous, PRN, Rosa Linn MD, 10 mL at 11/19/21 1501    sodium chloride 0.9% flush 10 mL, 10 mL, Intravenous, PRN, Rosa Linn MD, 10 mL at 11/19/21 1635    Allergies: Review of patient's allergies indicates:  No Known Allergies    Family History  Mother: Deborah  Present Age: 60  Occupation: retired retail  Medical/Psychiatric History: "diabetic"    Father: Paul   Present Age: 64  Occupation: retired   Medical/Psychiatric History: "lupus"    Siblings and present ages:  Shirlene - 37  Efraín - 41  Helga - 28  Erin - 27  Medical or Psychiatric Problems: "Helga has psychiatric issues, not sure what. I notice me, Aspalex, and Helga take on a lot of responsibility and get stressed a lot. Harder for us to bounce back. Efraín suffers from PTSD, past ."    Relationships with parents and siblings: "we all get along"    Developmental History  [place]    Developmental Milestones: Patient reports all met    History of Physical/Sexual Abuse: N/A    Childhood Behavioral Problems: N/A    Children  Names/Ages:  French - 23  Maurice - 23  Paul Christopher - 27    Medical or Psychiatric Problems:"Paul just found out he has ADHD. 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 I like to hang out with my social club"    Owns a gun? yes Secured? Yes     History:  no    Comments: Pt was " cooperative. Assessment done in office 416.     Discharge Plans:  Will follow-up with outpatient therapist for psychotherapy, outpatient psychiatrist for medication management and after care group with Dr. Lala pending availability.

## 2022-10-19 NOTE — PROGRESS NOTES
Group Psychotherapy (PhD/LCSW)    Site: [facility]    Clinical status of patient: Intensive Outpatient Program (IOP)    Date: 10/19/2022    Group Focus: DBT Skills    Length of service: 45-50 minutes    Number of patients in attendance: 4    Referred by: [facility]    Target symptoms: Depression and Anxiety    Patient's response to treatment: Active Listening and Self-disclosure    Progress toward goals: Progressing adequately    Interval History: Session focus was reviewing Mindfulness and interpersonal effectiveness skills. Patients engaged in counting of breath exercise and were provided a worksheet with instructions to keep track of practice.    Diagnosis:     ICD-10-CM ICD-9-CM   1. Adjustment disorder with mixed anxiety and depressed mood  F43.23 309.28         Plan: Continue treatment on [facility]

## 2022-10-19 NOTE — PROGRESS NOTES
"[facility] PROGRESS NOTE     PATIENT NAME: Paul Li Jr.  MRN: [id number]  ENCOUNTER DATE:  10/19/2022 9:18 AM   SITE:  U unit, [facility]  ADMIT DATE:   Pt Name: Paul Li Jr.   : 1975   MRN: [id number]        HISTORY OF PRESENTING ILLNESS:  Paul Li Jr. is a 47 y.o. male with history of metastatic triple negative breast cancer, HTN, anxiety, and T2DM who is admitted to BMU for suicidal ideations.     Today,     Pt reports feeling "edgy." Got irritated with wife for unreciprocated behavior (ie. Wife won't initiate affection). Trouble staying asleep with total of 4-5 hrs of sleep.      Considering increasing Prozac dosage. Not experiencing side effects; denies nausea. Interested in taking something for sleep, agrees to try trazodone.     Infusion tomorrow morning (10/20/22). Seeing a new GP at 9am on Tuesday.     He enjoys mindfulness activities. Still figuring out how to integrate journaling into his daily life. Finds resources provided thus far in program helpful and applicable. Did mention not feeling comfortable sharing in group yet due to perception that others would make fun of it or not keep it confidential.         Risk Parameters:  Patient reports no suicidal ideation  Patient reports no homicidal ideation  Patient reports no self-injurious behavior  Patient reports no violent behavior     Current psych meds  Prozac  Medication side effects:  None     Current Substance use  Alcohol : Occasionally  Nicotine:  3.75 pack years  Illicit's:  Marijuana  Other Rx controlled substances (Ex-opiates, stimulants etc): None        PAST PSYCHIATRIC, MEDICAL, AND SOCIAL HISTORY REVIEWED  The patient's past medical, family and social history have been reviewed and updated as appropriate within the electronic medical record      MEDICAL REVIEW OF SYSTEMS  History obtained from the patient  General : NO chills or fever  Respiratory: NO cough, shortness of breath  Cardiovascular: NO " chest pain, palpitations or racing heart  Gastrointestinal: NO nausea, vomiting, constipation or diarrhea  Neurological: NO confusion, dizziness, headaches or tremors  Psychiatric: please see HPI      ALL MEDICATIONS:     Current Outpatient Medications:     alpelisib 300 mg/day (150 mg x 2) Tab, Take 2 tablets (300 mg) by mouth once daily., Disp: 60 tablet, Rfl: 3    ALPRAZolam (XANAX) 0.5 MG tablet, Take 1 tablet (0.5 mg total) by mouth 3 (three) times daily as needed for Insomnia or Anxiety., Disp: 60 tablet, Rfl: 0    amLODIPine (NORVASC) 10 MG tablet, TAKE 1 TABLET (10 MG) BY MOUTH EVERY DAY, Disp: 90 tablet, Rfl: 3    calcium-vitamin D3 (OYSTER SHELL CALCIUM-VIT D3) 500 mg-5 mcg (200 unit) per tablet, Take 1 tablet by mouth 2 (two) times daily., Disp: 180 tablet, Rfl: 3    diphenoxylate-atropine 2.5-0.025 mg (LOMOTIL) 2.5-0.025 mg per tablet, Take 1 tablet by mouth 4 (four) times daily as needed for Diarrhea., Disp: 60 tablet, Rfl: 2    dulaglutide (TRULICITY) 1.5 mg/0.5 mL pen injector, Inject 1.5 mg into the skin once a week., Disp: 4 pen, Rfl: 2    FLUoxetine 20 MG capsule, Take 2 capsules (40 mg total) by mouth once daily., Disp: 90 capsule, Rfl: 1    gabapentin (NEURONTIN) 300 MG capsule, Take 1 capsule (300 mg total) by mouth 3 (three) times daily., Disp: 90 capsule, Rfl: 11    hydroCHLOROthiazide (HYDRODIURIL) 25 MG tablet, TAKE 1 TABLET BY MOUTH ONCE DAILY, Disp: 90 tablet, Rfl: 3    HYDROcodone-acetaminophen (NORCO)  mg per tablet, Take 1 tablet by mouth every 6 (six) hours as needed for Pain., Disp: 90 tablet, Rfl: 0    insulin detemir U-100 (LEVEMIR FLEXTOUCH U-100 INSULN) 100 unit/mL (3 mL) InPn pen, Inject 21 Units into the skin every evening., Disp: 6 mL, Rfl: 2    insulin lispro (HUMALOG KWIKPEN INSULIN) 100 unit/mL pen, Inject 5 Units into the skin 3 (three) times daily., Disp: 6 mL, Rfl: 11    lancets 33 gauge Misc, 1 lancet by Misc.(Non-Drug; Combo Route) route once daily., Disp: 100  "each, Rfl: 3    lancing device Misc, 1 Device by Misc.(Non-Drug; Combo Route) route 2 (two) times daily with meals., Disp: 1 each, Rfl: 0    LIDOcaine-prilocaine (EMLA) cream, Apply topically as needed., Disp: 30 g, Rfl: 0    losartan (COZAAR) 50 MG tablet, Take 2 tablets by mouth once daily, Disp: 180 tablet, Rfl: 3    metFORMIN (GLUCOPHAGE) 500 MG tablet, Take 2 tablets (1,000 mg total) by mouth 2 (two) times daily with meals. Needs appointment for future refills, Disp: 120 tablet, Rfl: 2    pen needle, diabetic (BD ULTRA-FINE TANESHA PEN NEEDLE) 32 gauge x 5/32" Ndle, Use as directed with novolog and levemir, Disp: 100 each, Rfl: 5    tadalafiL (CIALIS) 5 MG tablet, Take 2 tablets (10 mg total) by mouth daily as needed for Erectile Dysfunction., Disp: 20 tablet, Rfl: 1  No current facility-administered medications for this encounter.     Facility-Administered Medications Ordered in Other Encounters:     0.9%  NaCl infusion, , Intravenous, Continuous, Rosa Linn MD, Stopped at 11/19/21 1634    alteplase injection 2 mg, 2 mg, Intra-Catheter, PRN, Rosa Linn MD    heparin, porcine (PF) 100 unit/mL injection flush 500 Units, 500 Units, Intravenous, 1 time in Clinic/HOD, Richa Bahena MD    heparin, porcine (PF) 100 unit/mL injection flush 500 Units, 500 Units, Intravenous, PRN, Rosa Linn MD    heparin, porcine (PF) 100 unit/mL injection flush 500 Units, 500 Units, Intravenous, PRN, Rosa Linn MD, 500 Units at 11/19/21 1635    sodium chloride 0.9% 250 mL flush bag, , Intravenous, 1 time in Clinic/HOD, Rosa Linn MD    sodium chloride 0.9% flush 10 mL, 10 mL, Intravenous, 1 time in Clinic/HOD, Richa Bahena MD    sodium chloride 0.9% flush 10 mL, 10 mL, Intravenous, PRN, Rosa Linn MD, 10 mL at 11/19/21 1501    sodium chloride 0.9% flush 10 mL, 10 mL, Intravenous, PRN, Rosa Linn MD, 10 mL at 11/19/21 1630     ALLERGIES:  Review of patient's allergies indicates:  No Known Allergies   " "  RELEVANT LABS/STUDIES:          Lab Results   Component Value Date     WBC 5.70 10/13/2022     HGB 10.5 (L) 10/13/2022     HCT 35.0 (L) 10/13/2022     MCV 82 10/13/2022      10/13/2022      BMP        Lab Results   Component Value Date      10/13/2022     K 4.1 10/13/2022      10/13/2022     CO2 25 10/13/2022     BUN 14 10/13/2022     CREATININE 1.3 10/13/2022     CALCIUM 9.1 10/13/2022     ANIONGAP 8 10/13/2022     ESTGFRAFRICA >60.0 07/28/2022     EGFRNONAA >60.0 07/28/2022            Lab Results   Component Value Date     ALT 17 10/13/2022     AST 21 10/13/2022     ALKPHOS 99 10/13/2022     BILITOT 0.6 10/13/2022            Lab Results   Component Value Date     TSH 0.931 03/25/2022            Lab Results   Component Value Date     HGBA1C 6.3 (H) 05/26/2022         VITALS  defer to nursing note     PHYSICAL EXAM  General: well developed, well nourished  Neurologic:   Gait: Normal   Psychomotor signs:  No involuntary movements or tremor  AIMS: none     PSYCHIATRIC EXAM:     Mental Status Exam:  Appearance: unremarkable, age appropriate, obese  Behavior/Cooperation: limited/ appropriate normal, cooperative,  Speech:  normal tone, normal rate, normal pitch, normal volume  Language: uses words appropriately; NO aphasia or dysarthria  Mood: "OK"  Affect:  reactive  Thought Process: normal and logical  Thought Content: normal, no suicidality, no homicidality, delusions, or paranoia  Level of Consciousness: Alert and Oriented x3  Memory:  Intact to conversation  Attention/concentration: Intact to conversation  Fund of Knowledge: appears adequate  Insight: Intact  Judgment: Intact         IMPRESSION:    Paul Li Jr. is a 47 y.o. male with history of metastatic triple negative breast cancer, HTN, anxiety, and T2DM who is admitted to BMU for suicidal ideations.     Status/Progress:  Based on the examination today, the patient's problem(s) is/are adequately but not ideally controlled.  New problems " have been presented today.     DIAGNOSES:  Major depressive disorder, recurrent, moderate  Unspecified anxiety disorder     PLAN:  [place] [facility] [place] and individual therapies     Psych Med:  Increase Prozac to 80mg daily.  Start trazodone 50mg nightly for sleep adjunct. Consider up-titrating prn.   May continue xanax 0.5mg prn     Discussed with patient informed consent, risks vs. benefits, alternative treatments, side effect profile and the inherent unpredictability of individual responses to these treatments. Answered any questions patient may have had. The patient expresses understanding of the above and displays the capacity to agree with this current plan      3) Other: Labs/medical problems/ collateral- none needed       Jose Rafael Ross MD  [facility], PGY-4  10/19/2022

## 2022-10-19 NOTE — MEDICAL/APP STUDENT
"[facility] PROGRESS NOTE    PATIENT NAME: Paul Li Jr.  MRN: [id number]  ENCOUNTER DATE:  10/19/2022 9:18 AM   SITE:  U unit, [facility]  ADMIT DATE:   Pt Name: Paul Li Jr.   : 1975   MRN: [id number]      HISTORY OF PRESENTING ILLNESS:  Paul Li Jr. is a 47 y.o. male with history of metastatic triple negative breast cancer, HTN, anxiety, and T2DM who is admitted to BMU for suicidal ideations.    Today,    Pt reports feeling "edgy." Got irritated with wife for unreciprocated behavior (ie. Wife won't initiate affection). Trouble staying asleep with total of 4-5 hrs of sleep.     Considering increasing Prozac dosage. Not experiencing side effects; denies nausea. Interested in taking something for sleep.     Infusion tomorrow morning (10/20/22). Seeing a new GP at 9am on Tuesday.    He enjoys mindfulness activities. Still figuring out how to integrate journaling into his daily life.      Risk Parameters:  Patient reports no suicidal ideation  Patient reports no homicidal ideation  Patient reports no self-injurious behavior  Patient reports no violent behavior    Current psych meds  Prozac  Medication side effects:  None    Current Substance use  Alcohol : Occasionally  Nicotine:  3.75 pack years  Illicit's:  Marijuana  Other Rx controlled substances (Ex-opiates, stimulants etc): None      PAST PSYCHIATRIC, MEDICAL, AND SOCIAL HISTORY REVIEWED  The patient's past medical, family and social history have been reviewed and updated as appropriate within the electronic medical record     MEDICAL REVIEW OF SYSTEMS  History obtained from the patient  General : NO chills or fever  Respiratory: NO cough, shortness of breath  Cardiovascular: NO chest pain, palpitations or racing heart  Gastrointestinal: NO nausea, vomiting, constipation or diarrhea  Neurological: NO confusion, dizziness, headaches or tremors  Psychiatric: please see HPI     ALL MEDICATIONS:    Current Outpatient " Medications:     alpelisib 300 mg/day (150 mg x 2) Tab, Take 2 tablets (300 mg) by mouth once daily., Disp: 60 tablet, Rfl: 3    ALPRAZolam (XANAX) 0.5 MG tablet, Take 1 tablet (0.5 mg total) by mouth 3 (three) times daily as needed for Insomnia or Anxiety., Disp: 60 tablet, Rfl: 0    amLODIPine (NORVASC) 10 MG tablet, TAKE 1 TABLET (10 MG) BY MOUTH EVERY DAY, Disp: 90 tablet, Rfl: 3    calcium-vitamin D3 (OYSTER SHELL CALCIUM-VIT D3) 500 mg-5 mcg (200 unit) per tablet, Take 1 tablet by mouth 2 (two) times daily., Disp: 180 tablet, Rfl: 3    diphenoxylate-atropine 2.5-0.025 mg (LOMOTIL) 2.5-0.025 mg per tablet, Take 1 tablet by mouth 4 (four) times daily as needed for Diarrhea., Disp: 60 tablet, Rfl: 2    dulaglutide (TRULICITY) 1.5 mg/0.5 mL pen injector, Inject 1.5 mg into the skin once a week., Disp: 4 pen, Rfl: 2    FLUoxetine 20 MG capsule, Take 2 capsules (40 mg total) by mouth once daily., Disp: 90 capsule, Rfl: 1    gabapentin (NEURONTIN) 300 MG capsule, Take 1 capsule (300 mg total) by mouth 3 (three) times daily., Disp: 90 capsule, Rfl: 11    hydroCHLOROthiazide (HYDRODIURIL) 25 MG tablet, TAKE 1 TABLET BY MOUTH ONCE DAILY, Disp: 90 tablet, Rfl: 3    HYDROcodone-acetaminophen (NORCO)  mg per tablet, Take 1 tablet by mouth every 6 (six) hours as needed for Pain., Disp: 90 tablet, Rfl: 0    insulin detemir U-100 (LEVEMIR FLEXTOUCH U-100 INSULN) 100 unit/mL (3 mL) InPn pen, Inject 21 Units into the skin every evening., Disp: 6 mL, Rfl: 2    insulin lispro (HUMALOG KWIKPEN INSULIN) 100 unit/mL pen, Inject 5 Units into the skin 3 (three) times daily., Disp: 6 mL, Rfl: 11    lancets 33 gauge Misc, 1 lancet by Misc.(Non-Drug; Combo Route) route once daily., Disp: 100 each, Rfl: 3    lancing device Misc, 1 Device by Misc.(Non-Drug; Combo Route) route 2 (two) times daily with meals., Disp: 1 each, Rfl: 0    LIDOcaine-prilocaine (EMLA) cream, Apply topically as needed., Disp: 30 g, Rfl: 0    losartan (COZAAR)  "50 MG tablet, Take 2 tablets by mouth once daily, Disp: 180 tablet, Rfl: 3    metFORMIN (GLUCOPHAGE) 500 MG tablet, Take 2 tablets (1,000 mg total) by mouth 2 (two) times daily with meals. Needs appointment for future refills, Disp: 120 tablet, Rfl: 2    pen needle, diabetic (BD ULTRA-FINE TANESHA PEN NEEDLE) 32 gauge x 5/32" Ndle, Use as directed with novolog and levemir, Disp: 100 each, Rfl: 5    tadalafiL (CIALIS) 5 MG tablet, Take 2 tablets (10 mg total) by mouth daily as needed for Erectile Dysfunction., Disp: 20 tablet, Rfl: 1  No current facility-administered medications for this encounter.    Facility-Administered Medications Ordered in Other Encounters:     0.9%  NaCl infusion, , Intravenous, Continuous, Rosa Linn MD, Stopped at 11/19/21 1634    alteplase injection 2 mg, 2 mg, Intra-Catheter, PRN, Rosa Linn MD    heparin, porcine (PF) 100 unit/mL injection flush 500 Units, 500 Units, Intravenous, 1 time in Clinic/HOD, Richa Bahena MD    heparin, porcine (PF) 100 unit/mL injection flush 500 Units, 500 Units, Intravenous, PRN, oRsa Linn MD    heparin, porcine (PF) 100 unit/mL injection flush 500 Units, 500 Units, Intravenous, PRN, Rosa Linn MD, 500 Units at 11/19/21 1635    sodium chloride 0.9% 250 mL flush bag, , Intravenous, 1 time in Clinic/HOD, Rosa Linn MD    sodium chloride 0.9% flush 10 mL, 10 mL, Intravenous, 1 time in Clinic/HOD, Richa Bahena MD    sodium chloride 0.9% flush 10 mL, 10 mL, Intravenous, PRN, Rosa Linn MD, 10 mL at 11/19/21 1501    sodium chloride 0.9% flush 10 mL, 10 mL, Intravenous, PRN, Rosa Linn MD, 10 mL at 11/19/21 1635    ALLERGIES:  Review of patient's allergies indicates:  No Known Allergies    RELEVANT LABS/STUDIES:    Lab Results   Component Value Date    WBC 5.70 10/13/2022    HGB 10.5 (L) 10/13/2022    HCT 35.0 (L) 10/13/2022    MCV 82 10/13/2022     10/13/2022     BMP  Lab Results   Component Value Date     10/13/2022    " "K 4.1 10/13/2022     10/13/2022    CO2 25 10/13/2022    BUN 14 10/13/2022    CREATININE 1.3 10/13/2022    CALCIUM 9.1 10/13/2022    ANIONGAP 8 10/13/2022    ESTGFRAFRICA >60.0 07/28/2022    EGFRNONAA >60.0 07/28/2022     Lab Results   Component Value Date    ALT 17 10/13/2022    AST 21 10/13/2022    ALKPHOS 99 10/13/2022    BILITOT 0.6 10/13/2022     Lab Results   Component Value Date    TSH 0.931 03/25/2022     Lab Results   Component Value Date    HGBA1C 6.3 (H) 05/26/2022       VITALS  defer to nursing note    PHYSICAL EXAM  General: well developed, well nourished  Neurologic:   Gait: Normal   Psychomotor signs:  No involuntary movements or tremor  AIMS: none    PSYCHIATRIC EXAM:     Mental Status Exam:  Appearance: {appearance:69186::"unremarkable","age appropriate"}  Behavior/Cooperation: {behavior:92560::"normal","cooperative"}  Speech: {findings; speech:69765::"normal tone","normal rate","normal pitch","normal volume"}  Language: uses words appropriately; NO aphasia or dysarthria  Mood: {mood:22431}  Affect  Thought Process: {thought process:66750::"normal and logical"}  Thought Content: {normal:02666::"normal, no suicidality, no homicidality, delusions, or paranoia"}  Level of Consciousness: Alert and Oriented x3  Memory:  Intact  Attention/concentration: appropriate for age/education.   Fund of Knowledge: appears adequate  Insight:  Impaired/  Limited/ Intact  Judgment: Impaired/  Limited/ Intact       IMPRESSION:    Paul Li Jr. is a 47 y.o. male with history of metastatic triple negative breast cancer, HTN, anxiety, and T2DM who is admitted to BMU for suicidal ideations.    Status/Progress:  Based on the examination today, the patient's problem(s) is/are adequately but not ideally controlled.  New problems have been presented today.     DIAGNOSES:  No diagnosis found.    PLAN:    1) Continue BMU program with group and individual therapies.     2) Psych Med:  Continue **** / " Start****    Discussed with patient informed consent, risks vs. benefits, alternative treatments, side effect profile and the inherent unpredictability of individual responses to these treatments. Answered any questions patient may have had. The patient expresses understanding of the above and displays the capacity to agree with this current plan     3) Other: Labs/medical problems/ collateral- none needed      JIHAN RIVERA MD   [facility]  [facility]  10/19/2022 9:18 AM

## 2022-10-19 NOTE — PLAN OF CARE
10/19/22 1304   Activity/Group Therapy Checklist   Group Cognitive Therapy   Attendance Attended   Follows Direction Followed directions   Group Interactions/Observations Interacted appropriately;Sharing;Supportive   Affect/Mood Range Normal range   Affect/Mood Display Appropriate   Goal Progression Progressing      and group reviewed cognitive distortions. Patient denied any questions or concerns at this time.

## 2022-10-19 NOTE — PROGRESS NOTES
Group Psychotherapy (PhD/LCSW)    Site: [facility]    Clinical status of patient: Intensive Outpatient Program (IOP)    Date: 10/19/2022    Group Focus: Distress Tolerance    Length of service: 14636 - 45-50 minutes    Number of patients in attendance: 10    Referred by: [facility] Team    Target symptoms: Depression and Anxiety    Patient's response to treatment: Active Listening and Self-disclosure    Progress toward goals: Progressing adequately    Interval History: Session focus was Distress Tolerance:  IMPROVE.  Patients were encouraged to use imagery, find meaning, use prayer, relax, focus on one thing in the moment, take a brief vacation, and use self-encouragement.    Diagnosis:     ICD-10-CM ICD-9-CM   1. Adjustment disorder with mixed anxiety and depressed mood  F43.23 309.28         Plan: Continue treatment on W

## 2022-10-19 NOTE — PROGRESS NOTES
Group Psychotherapy (PhD/LCSW)     [place] [facility]     Clinical status of patient: Intensive Outpatient Program (IOP)     Date:10/19/2022    Group Focus: Psychodynamic Process Group     Length of service: 94598 - 50 minutes     Number of patients in attendance: 5     Referred by: [facility]     Target symptoms: Mood Disorder     Patient's response to treatment: Active Listening and Self-disclosure     Progress toward goals: Progressing appropriately     Interval History: Patient discussed he had a rough morning due to getting in an argument with his wife. He stated coming to group and being around positive people helped him feel better and he has had a better afternoon. He discussed his financial difficulties he is facing since being unable to work due to his cancer diagnosis.       Diagnosis:     ICD-10-CM ICD-9-CM   1. Adjustment disorder with mixed anxiety and depressed mood  F43.23 309.28         Plan: Continue [facility] program

## 2022-10-19 NOTE — PROGRESS NOTES
Physical Therapy Daily Treatment Note       Date: 10/19/2022   Name: Paul Li Jr.  Clinic Number: [id number]    Therapy Diagnosis:   Encounter Diagnoses   Name Primary?    Lymphedema of right arm Yes    Impaired functional mobility and activity tolerance     Poor posture      Physician: Rosa Linn MD    Physician Orders: PT Eval and Treat -RUE lymphedema  Medical Diagnosis: Malignant neoplasm involving both nipple and areola of right breast in male, estrogen receptor negative [C50.021, Z17.1], Lymphedema of right upper extremity   Evaluation Date: 10/10/2022  Authorization Period Expiration: 10/14/22  Plan of Care Certification Period: 1/6/23 (12 weeks)  Visit # / Visits authorized: 3/ 11 (plus evaluation)  Insurance: [employer]  FOTO: 1/3     Time In: 3:00 PM  Time Out: 4:02PM  Total Billable Time: 62 minutes     Precautions: Standard, Fall, cancer, and Spine, Bony Mets, hx of Seizures, L chest wall port      Subjective     Pt reports: Bandages started coming down a little, but he put tubigrip over it to help. Pt endorses some tightness in his back and fatigue from being at [facility] most of the day.   He was compliant with self manual lymph drainage  Response to previous treatment: his Right hand is smaller  Pain Scale: Paul rates pain on a scale of 0/10 on VAS.   Pain location: N/A     Fatigue: tired from full day at [facility]  Functional change: none stated  Treatment:   Chemotherapy: te-adjuvant chemo therapy completed 10/19  Pt is currently on chemotherapy: pt is getting 1 infusion/week for 3 weeks with 1 week break, and he takes oral chemotherapy daily.  Radiation: Completed in February 2020  Surgery date: 12/17/19 pt underwent right axillary lymph node dissection and resection excess lateral tissue; 11/22/19 right simple mastectomy with SLNB; total of 18 lymph nodes removed      Objective     Objective Measures updated at progress  "report unless specified.     LANDMARK RIGHT UE LEFT UE DIFF RUE Diff RUE Diff   Date Eval Eval Eval 10/17/22 10/17/22 10/29/22 10/19/22   E + 8" 50 cm 46.5 cm 3.5 cm 48 cm -2 47.5 cm -0.5   E + 6" 45.5 cm 44 cm 1.5 cm 45 cm -0.5 46.5 cm +1.5   E + 4" 40 cm 40.5 cm 0.5 cm 40.5 cm +0.5 40.5cm No chg   E + 2" 38.5 cm 38.5 cm 0 cm 39 cm +0.5 40 cm +1   Elbow 36.5 cm 34 cm 2.5 cm 35.5 cm -1 35.5 cm No chg   W+ 8" 37.5cm 33 cm 4.5 cm 38cm +0.5 36.5 cm -1.5   W +  6" 35 cm 29.5 cm 5.5 cm 34 cm -1 34.5 cm +0.5   W + 4" 31.5 cm 25 cm 6.5 cm 31.5 cm No chg 31.5 cm No chg   Wrist 23 cm 20 cm 3 cm 22.5cm -0.5 21.5 cm -1   DPC 28.5 cm 26 cm 2.5 cm 28 cm -0.5 27.5cm -0.5   IP Thumb 8.5 cm 8 cm 0.5 cm 8.5 cm No chg 8.5 cm No chg      Pt's sports compression sleeve appears too short and too tight in upper arm    Treatment     Paul received individual therapeutic exercises to improve postural correction and alignment, stretching and soft tissue mobility, and strengthening for 8 minutes including the following:     Diaphragmatic breathing, 2 x 5 reps  Scapular retractions, 10 reps  Shoulder rolls, 10 reps each fwd and back.    Paul received the following manual therapy techniques were performed to increased myofascial/soft tissue length, mobility and pliability, increase PROM, AROM and function as well as to decrease pain for 54 minutes    Measurements taken above    Short Neck- held due to history of seizures  Clear Healthy Lymph Nodes:  Left Axilla                                                  Right Groin  Open Anastomoses:  Anterior Axillo-Axillary  (AAA)                                    Axillo-Inguinal     (AI)                                    Posterior Axillo-Axillary  (GEREMIAS)      Right Upper Arm (Lateral and Medial)  Right  Antecubital Fossa  Right Dorsal Forearm  Right Volar Forearm  Dorsum Right Hand  Right fingers     Rework Right UE  Rework Anastomoses  Rework Healthy lymph Nodes    PT applies  gauze to right fingers " and hand and stockinette, cotton artiflex, and short stretch bandages to pt's RIGHT/LEFT/BILATERAL: right upper extremity. PT added size H tubigrip over bandaging to assist keeping it in place. Pt educated to wear bandaging for next 2-3 days unless it causes pain, numbness, or changes in skin color/temperature, or if they start to fall down.  If removed, pt instructed to roll up bandages and cotton padding and bring to next session. If bandages are removed, pt can wear his tubigrip.  Pt has Vet glove to cover bandages in the shower and directions on how to care for bandages. Pt verbalizes understanding of all topics.            Home Exercise Program and Patient Education   Education provided re:  - role of PT in multi - disciplinary team, goals for PT  - progress towards goals         Written Home Exercises Provided: Patient instructed to cont prior HEP.  Exercises were reviewed and Paul was able to demonstrate them prior to the end of the session.  Paul demonstrated good  understanding of the education provided.     See EMR under Media for self lymphatic drainage provided prior visit.    Pt has no cultural, educational or language barriers to learning provided.    Assessment     Patient is responding well to physical therapy. Pt has been cleared by referring physician to receive complete decongestive therapy. He has been compliant with self manual lymph drainage and wearing compression bandages/tubigrip. Pt with mix of reductions and increases in right arm circumference. But overall, pt with reduction in right arm circumference. Pt received complete decongestive therapy today without issue in clinic. Will gauge response at next session and progress as tolerated.      Pt prognosis is Good. Pt will continue to benefit from skilled outpatient physical therapy to address the deficits listed in the problem list chart on initial evaluation, provide pt/family education and to maximize pt's level of independence in the  home and community environment.     Anticipated barriers to physical therapy: advanced disease    Goals as follows:  Short Term Goals (6 weeks)  1. Pt will demo decrease in girth in right upper extremity by >/= 2cm to allow for improved UE symmetry, clothing choice, ROM, and UE function. (progressing, not met)  2. Patient will demonstrate 100% knowledge of lymphedema precautions and signs of infection to allow for reduced risk of lymphedema, reduced risk of infection, and/or exacerbation.  (progressing, not met)  3. Patient will perform self lymph drainage techniques to enhance lymphatic drainage and mobility.  (progressing, not met)  4. Patient will tolerate daily activities with multilayered bandaging to enhance lymphatic drainage and skin elasticity.  (progressing, not met)  5. Pt will tolerate HEP for improved strength, functional mobility, ROM, posture, and endurance. (progressing, not met)        Long Term Goals: ( 12  weeks)  1. Patient to show decreased girth in right upper extremity by >/= 3cm to allow for improved UE symmetry, clothing choice, ROM, and UE function.  (progressing, not met)  2. Patient will show reduction in density to mild or less with improved contour of limb to allow for improved cosmesis, improved lymphatic drainage, and functional mobility.  (progressing, not met)  3. Patient to bruna/doff compression garment with daily compliance to support lymphatic mobility and skin elasticity.  (progressing, not met)  4. Pt to show improved postural awareness and alignment for improved functional mobility.  (progressing, not met)  5. Pt to be independent and compliant with HEP to allow for improved ROM, reach and carry with functional endurance and strength.    (progressing, not met)           Plan   Outpatient physical therapy 2x week for 12 weeks to include the following:   Manual Therapy, Neuromuscular Re-ed, Orthotic Management and Training, Patient Education, Self Care, Therapeutic Activities,  and Therapeutic Exercise.     Plan of care Certification Period: 10/10/2022 to 1/6/23.       Therapist: Katelynn Harrell, PT  10/19/2022

## 2022-10-20 ENCOUNTER — INFUSION (OUTPATIENT)
Dept: INFUSION THERAPY | Facility: HOSPITAL | Age: 47
End: 2022-10-20
Attending: INTERNAL MEDICINE
Payer: MEDICARE

## 2022-10-20 ENCOUNTER — HOSPITAL ENCOUNTER (OUTPATIENT)
Dept: RADIOLOGY | Facility: HOSPITAL | Age: 47
Discharge: HOME | End: 2022-10-20
Attending: UROLOGY
Payer: MEDICARE

## 2022-10-20 VITALS
TEMPERATURE: 98 F | RESPIRATION RATE: 18 BRPM | HEART RATE: 61 BPM | DIASTOLIC BLOOD PRESSURE: 68 MMHG | SYSTOLIC BLOOD PRESSURE: 121 MMHG | OXYGEN SATURATION: 100 %

## 2022-10-20 DIAGNOSIS — N43.3 HYDROCELE IN ADULT: ICD-10-CM

## 2022-10-20 DIAGNOSIS — C50.021 MALIGNANT NEOPLASM INVOLVING BOTH NIPPLE AND AREOLA OF RIGHT BREAST IN MALE, ESTROGEN RECEPTOR NEGATIVE: Primary | ICD-10-CM

## 2022-10-20 DIAGNOSIS — Z17.1 MALIGNANT NEOPLASM INVOLVING BOTH NIPPLE AND AREOLA OF RIGHT BREAST IN MALE, ESTROGEN RECEPTOR NEGATIVE: Primary | ICD-10-CM

## 2022-10-20 LAB
THC CONFIRMATION CHAIN OF CUSTODY: NORMAL
THC UR-MCNC: 158 NG/ML
TOXICOLOGIST REVIEW: NORMAL

## 2022-10-20 PROCEDURE — 76870 US SCROTUM AND TESTICLES: ICD-10-PCS | Mod: 26,,, | Performed by: RADIOLOGY

## 2022-10-20 PROCEDURE — 63600175 PHARM REV CODE 636 W HCPCS: Performed by: NURSE PRACTITIONER

## 2022-10-20 PROCEDURE — 96413 CHEMO IV INFUSION 1 HR: CPT

## 2022-10-20 PROCEDURE — 96361 HYDRATE IV INFUSION ADD-ON: CPT

## 2022-10-20 PROCEDURE — 25000003 PHARM REV CODE 250: Performed by: NURSE PRACTITIONER

## 2022-10-20 PROCEDURE — 76870 US EXAM SCROTUM: CPT | Mod: TC

## 2022-10-20 PROCEDURE — 76870 US EXAM SCROTUM: CPT | Mod: 26,,, | Performed by: RADIOLOGY

## 2022-10-20 RX ORDER — HEPARIN 100 UNIT/ML
500 SYRINGE INTRAVENOUS
Status: CANCELLED | OUTPATIENT
Start: 2022-10-20

## 2022-10-20 RX ORDER — SODIUM CHLORIDE 9 MG/ML
INJECTION, SOLUTION INTRAVENOUS CONTINUOUS
Status: CANCELLED | OUTPATIENT
Start: 2022-10-20

## 2022-10-20 RX ORDER — HEPARIN 100 UNIT/ML
500 SYRINGE INTRAVENOUS
Status: DISCONTINUED | OUTPATIENT
Start: 2022-10-20 | End: 2022-10-20 | Stop reason: HOSPADM

## 2022-10-20 RX ORDER — SODIUM CHLORIDE 0.9 % (FLUSH) 0.9 %
10 SYRINGE (ML) INJECTION
Status: CANCELLED | OUTPATIENT
Start: 2022-10-20

## 2022-10-20 RX ORDER — SODIUM CHLORIDE 9 MG/ML
INJECTION, SOLUTION INTRAVENOUS CONTINUOUS
Status: DISCONTINUED | OUTPATIENT
Start: 2022-10-20 | End: 2022-10-20 | Stop reason: HOSPADM

## 2022-10-20 RX ORDER — SODIUM CHLORIDE 0.9 % (FLUSH) 0.9 %
10 SYRINGE (ML) INJECTION
Status: DISCONTINUED | OUTPATIENT
Start: 2022-10-20 | End: 2022-10-20 | Stop reason: HOSPADM

## 2022-10-20 RX ADMIN — HEPARIN 500 UNITS: 100 SYRINGE at 01:10

## 2022-10-20 RX ADMIN — PACLITAXEL 220 MG: 100 INJECTION, POWDER, LYOPHILIZED, FOR SUSPENSION INTRAVENOUS at 12:10

## 2022-10-20 RX ADMIN — SODIUM CHLORIDE: 0.9 INJECTION, SOLUTION INTRAVENOUS at 11:10

## 2022-10-20 NOTE — PLAN OF CARE
Pt ambulatory to clinic alone today for Abraxanne and IVF's. Denies any sig complaints. Port accessed without difficulty. Pt tolerated well. Pt resting in clinic till time for US in Radiology. Port deaccessed after flushing. Pt ambulatory from clinic in [facility].

## 2022-10-21 ENCOUNTER — HOSPITAL ENCOUNTER (OUTPATIENT)
Dept: PSYCHIATRY | Facility: HOSPITAL | Age: 47
Discharge: HOME | End: 2022-10-21
Payer: MEDICARE

## 2022-10-21 VITALS
RESPIRATION RATE: 18 BRPM | DIASTOLIC BLOOD PRESSURE: 67 MMHG | SYSTOLIC BLOOD PRESSURE: 128 MMHG | TEMPERATURE: 97 F | HEART RATE: 73 BPM

## 2022-10-21 DIAGNOSIS — F43.23 ADJUSTMENT DISORDER WITH MIXED ANXIETY AND DEPRESSED MOOD: Primary | ICD-10-CM

## 2022-10-21 PROCEDURE — 90853 GROUP PSYCHOTHERAPY: CPT

## 2022-10-21 NOTE — PROGRESS NOTES
[facility] PROGRESS NOTE     PATIENT NAME: Paul Li Jr.  MRN: [id number]  ENCOUNTER DATE:  10/19/2022 9:18 AM   SITE:  U unit, [facility]  ADMIT DATE:   Pt Name: Paul Li Jr.   : 1975   MRN: [id number]        HISTORY OF PRESENTING ILLNESS:  Paul Li Jr. is a 47 y.o. male with history of metastatic triple negative breast cancer, HTN, anxiety, and T2DM who is admitted to BMU for suicidal ideations.     Today,     Pt reports doing OK, taking day by day. Took increased dose of prozac this morning, has not tried trazodone yet. Feels well this morning but yesterday was dealing with relationship stressor, gets emotional, angry which elevates situation out of proportion. Denies recent passive/active SI.         Risk Parameters:  Patient reports no suicidal ideation  Patient reports no homicidal ideation  Patient reports no self-injurious behavior  Patient reports no violent behavior     Current psych meds  Prozac  Medication side effects:  None     Current Substance use  Alcohol : Occasionally  Nicotine:  3.75 pack years  Illicit's:  Marijuana  Other Rx controlled substances (Ex-opiates, stimulants etc): None        PAST PSYCHIATRIC, MEDICAL, AND SOCIAL HISTORY REVIEWED  The patient's past medical, family and social history have been reviewed and updated as appropriate within the electronic medical record      MEDICAL REVIEW OF SYSTEMS  History obtained from the patient  General : NO chills or fever  Respiratory: NO cough, shortness of breath  Cardiovascular: NO chest pain, palpitations or racing heart  Gastrointestinal: NO nausea, vomiting, constipation or diarrhea  Neurological: NO confusion, dizziness, headaches or tremors  Psychiatric: please see HPI      ALL MEDICATIONS:     Current Outpatient Medications:     alpelisib 300 mg/day (150 mg x 2) Tab, Take 2 tablets (300 mg) by mouth once daily., Disp: 60 tablet, Rfl: 3    ALPRAZolam (XANAX) 0.5 MG tablet, Take 1 tablet (0.5 mg  total) by mouth 3 (three) times daily as needed for Insomnia or Anxiety., Disp: 60 tablet, Rfl: 0    amLODIPine (NORVASC) 10 MG tablet, TAKE 1 TABLET (10 MG) BY MOUTH EVERY DAY, Disp: 90 tablet, Rfl: 3    calcium-vitamin D3 (OYSTER SHELL CALCIUM-VIT D3) 500 mg-5 mcg (200 unit) per tablet, Take 1 tablet by mouth 2 (two) times daily., Disp: 180 tablet, Rfl: 3    diphenoxylate-atropine 2.5-0.025 mg (LOMOTIL) 2.5-0.025 mg per tablet, Take 1 tablet by mouth 4 (four) times daily as needed for Diarrhea., Disp: 60 tablet, Rfl: 2    dulaglutide (TRULICITY) 1.5 mg/0.5 mL pen injector, Inject 1.5 mg into the skin once a week., Disp: 4 pen, Rfl: 2    FLUoxetine 20 MG capsule, Take 2 capsules (40 mg total) by mouth once daily., Disp: 90 capsule, Rfl: 1    gabapentin (NEURONTIN) 300 MG capsule, Take 1 capsule (300 mg total) by mouth 3 (three) times daily., Disp: 90 capsule, Rfl: 11    hydroCHLOROthiazide (HYDRODIURIL) 25 MG tablet, TAKE 1 TABLET BY MOUTH ONCE DAILY, Disp: 90 tablet, Rfl: 3    HYDROcodone-acetaminophen (NORCO)  mg per tablet, Take 1 tablet by mouth every 6 (six) hours as needed for Pain., Disp: 90 tablet, Rfl: 0    insulin detemir U-100 (LEVEMIR FLEXTOUCH U-100 INSULN) 100 unit/mL (3 mL) InPn pen, Inject 21 Units into the skin every evening., Disp: 6 mL, Rfl: 2    insulin lispro (HUMALOG KWIKPEN INSULIN) 100 unit/mL pen, Inject 5 Units into the skin 3 (three) times daily., Disp: 6 mL, Rfl: 11    lancets 33 gauge Misc, 1 lancet by Misc.(Non-Drug; Combo Route) route once daily., Disp: 100 each, Rfl: 3    lancing device Misc, 1 Device by Misc.(Non-Drug; Combo Route) route 2 (two) times daily with meals., Disp: 1 each, Rfl: 0    LIDOcaine-prilocaine (EMLA) cream, Apply topically as needed., Disp: 30 g, Rfl: 0    losartan (COZAAR) 50 MG tablet, Take 2 tablets by mouth once daily, Disp: 180 tablet, Rfl: 3    metFORMIN (GLUCOPHAGE) 500 MG tablet, Take 2 tablets (1,000 mg total) by mouth 2 (two) times daily with  "meals. Needs appointment for future refills, Disp: 120 tablet, Rfl: 2    pen needle, diabetic (BD ULTRA-FINE TANESHA PEN NEEDLE) 32 gauge x 5/32" Ndle, Use as directed with novolog and levemir, Disp: 100 each, Rfl: 5    tadalafiL (CIALIS) 5 MG tablet, Take 2 tablets (10 mg total) by mouth daily as needed for Erectile Dysfunction., Disp: 20 tablet, Rfl: 1  No current facility-administered medications for this encounter.     Facility-Administered Medications Ordered in Other Encounters:     0.9%  NaCl infusion, , Intravenous, Continuous, Rosa Linn MD, Stopped at 11/19/21 1634    alteplase injection 2 mg, 2 mg, Intra-Catheter, PRN, Rosa Linn MD    heparin, porcine (PF) 100 unit/mL injection flush 500 Units, 500 Units, Intravenous, 1 time in Clinic/HOD, Richa Bahena MD    heparin, porcine (PF) 100 unit/mL injection flush 500 Units, 500 Units, Intravenous, PRN, Rosa Linn MD    heparin, porcine (PF) 100 unit/mL injection flush 500 Units, 500 Units, Intravenous, PRN, Rosa Linn MD, 500 Units at 11/19/21 1635    sodium chloride 0.9% 250 mL flush bag, , Intravenous, 1 time in Clinic/HOD, Rosa Linn MD    sodium chloride 0.9% flush 10 mL, 10 mL, Intravenous, 1 time in Clinic/HOD, Richa Bahena MD    sodium chloride 0.9% flush 10 mL, 10 mL, Intravenous, PRN, Rosa Linn MD, 10 mL at 11/19/21 1501    sodium chloride 0.9% flush 10 mL, 10 mL, Intravenous, PRN, Rosa Linn MD, 10 mL at 11/19/21 1635     ALLERGIES:  Review of patient's allergies indicates:  No Known Allergies     RELEVANT LABS/STUDIES:          Lab Results   Component Value Date     WBC 5.70 10/13/2022     HGB 10.5 (L) 10/13/2022     HCT 35.0 (L) 10/13/2022     MCV 82 10/13/2022      10/13/2022      BMP        Lab Results   Component Value Date      10/13/2022     K 4.1 10/13/2022      10/13/2022     CO2 25 10/13/2022     BUN 14 10/13/2022     CREATININE 1.3 10/13/2022     CALCIUM 9.1 10/13/2022     ANIONGAP 8 " "10/13/2022     ESTGFRAFRICA >60.0 07/28/2022     EGFRNONAA >60.0 07/28/2022            Lab Results   Component Value Date     ALT 17 10/13/2022     AST 21 10/13/2022     ALKPHOS 99 10/13/2022     BILITOT 0.6 10/13/2022            Lab Results   Component Value Date     TSH 0.931 03/25/2022            Lab Results   Component Value Date     HGBA1C 6.3 (H) 05/26/2022         VITALS  defer to nursing note     PHYSICAL EXAM  General: well developed, well nourished  Neurologic:   Gait: Normal   Psychomotor signs:  No involuntary movements or tremor  AIMS: none     PSYCHIATRIC EXAM:     Mental Status Exam:  Appearance: unremarkable, age appropriate, obese  Behavior/Cooperation: limited/ appropriate normal, cooperative,  Speech:  normal tone, normal rate, normal pitch, normal volume  Language: uses words appropriately; NO aphasia or dysarthria  Mood: "OK, same"  Affect:  reactive  Thought Process: normal and logical  Thought Content: normal, no suicidality, no homicidality, delusions, or paranoia  Level of Consciousness: Alert and Oriented x3  Memory:  Intact to conversation  Attention/concentration: Intact to conversation  Fund of Knowledge: appears adequate  Insight: Intact  Judgment: Intact         IMPRESSION:    Paul Li Jr. is a 47 y.o. male with history of metastatic triple negative breast cancer, HTN, anxiety, and T2DM who is admitted to BMU for suicidal ideations.     Status/Progress:  Based on the examination today, the patient's problem(s) is/are adequately but not ideally controlled.  New problems have been presented today.     DIAGNOSES:  Major depressive disorder, recurrent, moderate  Unspecified anxiety disorder     PLAN:  Continue BMU treatment- group and individual therapies     Psych Med:  Continue Prozac 80mg daily.  Start trazodone 50mg-100mg nightly for sleep adjunct.   May continue xanax 0.5mg prn     Discussed with patient informed consent, risks vs. benefits, alternative treatments, side effect " profile and the inherent unpredictability of individual responses to these treatments. Answered any questions patient may have had. The patient expresses understanding of the above and displays the capacity to agree with this current plan      3) Other: Labs/medical problems/ collateral- none needed       Jose Rafael Ross MD  [facility], PGY-4  10/21/2022

## 2022-10-21 NOTE — PLAN OF CARE
10/21/22 1109   Activity/Group Therapy Checklist   Group Wellness   Attendance Attended   Follows Direction Followed directions   Group Interactions/Observations Interacted appropriately;Sharing;Supportive   Affect/Mood Range Normal range   Affect/Mood Display Appropriate   Goal Progression Progressing      and SW Intern reviewed [place] of Control worksheets with group. Patient denied any questions or concerns at this time.   
Yes

## 2022-10-23 ENCOUNTER — PATIENT MESSAGE (OUTPATIENT)
Dept: HEMATOLOGY/ONCOLOGY | Facility: CLINIC | Age: 47
End: 2022-10-23
Payer: MEDICARE

## 2022-10-24 NOTE — PROGRESS NOTES
Group Psychotherapy (PhD/LCSW)     [place] [facility]     Clinical status of patient: Intensive Outpatient Program (IOP)     Date: 10/21/2022    Group Focus: Psychodynamic Process Group     Length of service: 78666 - 50 minutes     Number of patients in attendance: 4     [facility]     Target symptoms: Mood Disorder     Patient's response to treatment: Active Listening and Self-disclosure     Progress toward goals: Progressing appropriately     Interval History: Patient discussed his excitement in getting to practice his wise mind vs emotional mind skills over the weekend. Patient reflected that he had come to understand that when he becomes upset it is usually an accumulation of stressors rather than one specific incident. The clinician positively reinforced the patient's use of mindfulness to help identify his level of distress and making choices to respond differently.      Diagnosis:     ICD-10-CM ICD-9-CM   1. Adjustment disorder with mixed anxiety and depressed mood  F43.23 309.28           Plan: Continue [facility] program

## 2022-10-25 ENCOUNTER — HOSPITAL ENCOUNTER (OUTPATIENT)
Dept: PSYCHIATRY | Facility: HOSPITAL | Age: 47
Discharge: HOME | End: 2022-10-25
Payer: MEDICARE

## 2022-10-25 ENCOUNTER — PATIENT MESSAGE (OUTPATIENT)
Dept: HEMATOLOGY/ONCOLOGY | Facility: CLINIC | Age: 47
End: 2022-10-25
Payer: MEDICARE

## 2022-10-25 DIAGNOSIS — F43.23 ADJUSTMENT DISORDER WITH MIXED ANXIETY AND DEPRESSED MOOD: Primary | ICD-10-CM

## 2022-10-25 PROCEDURE — 90853 PR GROUP PSYCHOTHERAPY: ICD-10-PCS | Mod: ,,, | Performed by: PSYCHOLOGIST

## 2022-10-25 PROCEDURE — 90853 GROUP PSYCHOTHERAPY: CPT | Mod: ,,, | Performed by: PSYCHOLOGIST

## 2022-10-25 PROCEDURE — 99232 SBSQ HOSP IP/OBS MODERATE 35: CPT | Mod: ,,, | Performed by: PSYCHIATRY & NEUROLOGY

## 2022-10-25 PROCEDURE — 99232 PR SUBSEQUENT HOSPITAL CARE,LEVL II: ICD-10-PCS | Mod: ,,, | Performed by: PSYCHIATRY & NEUROLOGY

## 2022-10-25 PROCEDURE — 90853 GROUP PSYCHOTHERAPY: CPT

## 2022-10-25 NOTE — PROGRESS NOTES
Group Psychotherapy (PhD/LCSW)     Site: [facility]     Clinical status of patient: Intensive Outpatient Program (IOP)     Date:10/25/2022    Group Focus: Psychodynamic Process Group     Length of service: 99781 - 50 minutes     Number of patients in attendance: 7     Referred by: [facility] Treatment Team     Target symptoms: Mood Disorder     Patient's response to treatment: Active Listening and Self-disclosure     Progress toward goals: Progressing appropriately     Interval History: Patient shared he is having a good day. He had a nice weekend although he had some negative side effects from his chemo. He really enjoyed learning about validation in the morning group.    Risk parameters:  Patient reports no suicidal ideation  Patient reports no homicidal ideation  Patient reports no self-injurious behavior  Patient reports no violent behavior    Diagnosis:     ICD-10-CM ICD-9-CM   1. Adjustment disorder with mixed anxiety and depressed mood  F43.23 309.28         Plan: Continue [facility] program

## 2022-10-25 NOTE — PLAN OF CARE
10/25/22 1204   Activity/Group Therapy Checklist   Group Other (Comments)  (Communication Skills)   Attendance Attended   Follows Direction Followed directions   Group Interactions/Observations Interacted appropriately;Sharing;Supportive   Affect/Mood Range Normal range   Affect/Mood Display Appropriate   Goal Progression Progressing      and group reviewed conflict resolution skills. Patient denied any questions or concerns at this time.

## 2022-10-25 NOTE — PROGRESS NOTES
Group Psychotherapy (PhD/LCSW)    Site: [facility]    Clinical status of patient: Intensive Outpatient Program (IOP)    Date: 10/25/2022    Group Focus: Interpersonal Effectiveness Skills    Length of service: 45-50 minutes    Number of patients in attendance: 11    Referred by: [facility]    Target symptoms: Depression and Anxiety    Patient's response to treatment: Active Listening and Self-disclosure    Progress toward goals: Progressing adequately    Interval History: Session focus was Interpersonal Effectiveness. Patients were introduced to Validation. Patient engaged with how to validate, and why to use validation.    Diagnosis:     ICD-10-CM ICD-9-CM   1. Adjustment disorder with mixed anxiety and depressed mood  F43.23 309.28       Plan: Continue treatment on [facility]

## 2022-10-25 NOTE — PROGRESS NOTES
"[facility] PROGRESS NOTE     PATIENT NAME: Paul Li Jr.  MRN: [id number]  ENCOUNTER DATE:  10/19/2022 9:18 AM   SITE:  U unit, [facility]  ADMIT DATE:   Pt Name: Paul Li Jr.   : 1975   MRN: [id number]        HISTORY OF PRESENTING ILLNESS:  Paul Li Jr. is a 47 y.o. male with history of metastatic triple negative breast cancer, HTN, anxiety, and T2DM who is admitted to BMU for suicidal ideations.     Today,     Pt reports feeling "alright." Mood has been ok. Less upset moments. Able to recognize emotions and self-regulate much better. Some thoughts about upcoming surgery 10/29/22 (ie "what if I don't wake up from the anesthesia?") that patient states are unreasonable but frequent. Coping strategies include "shutting off those thoughts," rationalizing them, reassuring self.     States trazodone doesn't help fall asleep but has only been taking 50mg dose. Frequent awakenings remains. Estimates total sleep at 4-5 hours/ night. Will consider taking sleep meds earlier in the night and doubling dose.        Risk Parameters:  Patient reports no suicidal ideation  Patient reports no homicidal ideation  Patient reports no self-injurious behavior  Patient reports no violent behavior     Current psych meds  Prozac 80mg  Trazodone 50mg  Medication side effects:  None     Current Substance use  Alcohol : Occasionally  Nicotine:  3.75 pack years  Illicit's:  Marijuana  Other Rx controlled substances (Ex-opiates, stimulants etc): None        PAST PSYCHIATRIC, MEDICAL, AND SOCIAL HISTORY REVIEWED  The patient's past medical, family and social history have been reviewed and updated as appropriate within the electronic medical record      MEDICAL REVIEW OF SYSTEMS  History obtained from the patient  General : NO chills or fever  Respiratory: NO cough, shortness of breath  Cardiovascular: NO chest pain, palpitations or racing heart  Gastrointestinal: NO nausea, vomiting, constipation or " diarrhea  Neurological: NO confusion, dizziness, headaches or tremors  Psychiatric: please see HPI      ALL MEDICATIONS:     Current Outpatient Medications:     alpelisib 300 mg/day (150 mg x 2) Tab, Take 2 tablets (300 mg) by mouth once daily., Disp: 60 tablet, Rfl: 3    ALPRAZolam (XANAX) 0.5 MG tablet, Take 1 tablet (0.5 mg total) by mouth 3 (three) times daily as needed for Insomnia or Anxiety., Disp: 60 tablet, Rfl: 0    amLODIPine (NORVASC) 10 MG tablet, TAKE 1 TABLET (10 MG) BY MOUTH EVERY DAY, Disp: 90 tablet, Rfl: 3    calcium-vitamin D3 (OYSTER SHELL CALCIUM-VIT D3) 500 mg-5 mcg (200 unit) per tablet, Take 1 tablet by mouth 2 (two) times daily., Disp: 180 tablet, Rfl: 3    diphenoxylate-atropine 2.5-0.025 mg (LOMOTIL) 2.5-0.025 mg per tablet, Take 1 tablet by mouth 4 (four) times daily as needed for Diarrhea., Disp: 60 tablet, Rfl: 2    dulaglutide (TRULICITY) 1.5 mg/0.5 mL pen injector, Inject 1.5 mg into the skin once a week., Disp: 4 pen, Rfl: 2    FLUoxetine 20 MG capsule, Take 2 capsules (40 mg total) by mouth once daily., Disp: 90 capsule, Rfl: 1    gabapentin (NEURONTIN) 300 MG capsule, Take 1 capsule (300 mg total) by mouth 3 (three) times daily., Disp: 90 capsule, Rfl: 11    hydroCHLOROthiazide (HYDRODIURIL) 25 MG tablet, TAKE 1 TABLET BY MOUTH ONCE DAILY, Disp: 90 tablet, Rfl: 3    HYDROcodone-acetaminophen (NORCO)  mg per tablet, Take 1 tablet by mouth every 6 (six) hours as needed for Pain., Disp: 90 tablet, Rfl: 0    insulin detemir U-100 (LEVEMIR FLEXTOUCH U-100 INSULN) 100 unit/mL (3 mL) InPn pen, Inject 21 Units into the skin every evening., Disp: 6 mL, Rfl: 2    insulin lispro (HUMALOG KWIKPEN INSULIN) 100 unit/mL pen, Inject 5 Units into the skin 3 (three) times daily., Disp: 6 mL, Rfl: 11    lancets 33 gauge Misc, 1 lancet by Misc.(Non-Drug; Combo Route) route once daily., Disp: 100 each, Rfl: 3    lancing device Misc, 1 Device by Misc.(Non-Drug; Combo Route) route 2 (two) times  "daily with meals., Disp: 1 each, Rfl: 0    LIDOcaine-prilocaine (EMLA) cream, Apply topically as needed., Disp: 30 g, Rfl: 0    losartan (COZAAR) 50 MG tablet, Take 2 tablets by mouth once daily, Disp: 180 tablet, Rfl: 3    metFORMIN (GLUCOPHAGE) 500 MG tablet, Take 2 tablets (1,000 mg total) by mouth 2 (two) times daily with meals. Needs appointment for future refills, Disp: 120 tablet, Rfl: 2    pen needle, diabetic (BD ULTRA-FINE TANESHA PEN NEEDLE) 32 gauge x 5/32" Ndle, Use as directed with novolog and levemir, Disp: 100 each, Rfl: 5    tadalafiL (CIALIS) 5 MG tablet, Take 2 tablets (10 mg total) by mouth daily as needed for Erectile Dysfunction., Disp: 20 tablet, Rfl: 1  No current facility-administered medications for this encounter.     Facility-Administered Medications Ordered in Other Encounters:     0.9%  NaCl infusion, , Intravenous, Continuous, Rosa Linn MD, Stopped at 11/19/21 1634    alteplase injection 2 mg, 2 mg, Intra-Catheter, PRN, Rosa Linn MD    heparin, porcine (PF) 100 unit/mL injection flush 500 Units, 500 Units, Intravenous, 1 time in Clinic/HOD, Richa Bahena MD    heparin, porcine (PF) 100 unit/mL injection flush 500 Units, 500 Units, Intravenous, PRN, Rosa Linn MD    heparin, porcine (PF) 100 unit/mL injection flush 500 Units, 500 Units, Intravenous, PRN, Rosa Linn MD, 500 Units at 11/19/21 1635    sodium chloride 0.9% 250 mL flush bag, , Intravenous, 1 time in Clinic/HOD, Rosa Linn MD    sodium chloride 0.9% flush 10 mL, 10 mL, Intravenous, 1 time in Clinic/HOD, Richa Bahena MD    sodium chloride 0.9% flush 10 mL, 10 mL, Intravenous, PRN, Rosa Linn MD, 10 mL at 11/19/21 1501    sodium chloride 0.9% flush 10 mL, 10 mL, Intravenous, PRN, Rosa Linn MD, 10 mL at 11/19/21 1575     ALLERGIES:  Review of patient's allergies indicates:  No Known Allergies     RELEVANT LABS/STUDIES:          Lab Results   Component Value Date     WBC 5.70 10/13/2022     HGB " 10.5 (L) 10/13/2022     HCT 35.0 (L) 10/13/2022     MCV 82 10/13/2022      10/13/2022      BMP        Lab Results   Component Value Date      10/13/2022     K 4.1 10/13/2022      10/13/2022     CO2 25 10/13/2022     BUN 14 10/13/2022     CREATININE 1.3 10/13/2022     CALCIUM 9.1 10/13/2022     ANIONGAP 8 10/13/2022     ESTGFRAFRICA >60.0 07/28/2022     EGFRNONAA >60.0 07/28/2022            Lab Results   Component Value Date     ALT 17 10/13/2022     AST 21 10/13/2022     ALKPHOS 99 10/13/2022     BILITOT 0.6 10/13/2022            Lab Results   Component Value Date     TSH 0.931 03/25/2022            Lab Results   Component Value Date     HGBA1C 6.3 (H) 05/26/2022         VITALS  defer to nursing note     PHYSICAL EXAM  General: well developed, well nourished  Neurologic:   Gait: Normal   Psychomotor signs:  No involuntary movements or tremor  AIMS: none     PSYCHIATRIC EXAM:     Mental Status Exam:  Appearance: unremarkable, age appropriate, obese  Behavior/Cooperation: limited/ appropriate normal, cooperative,  Speech:  normal tone, normal rate, normal pitch, normal volume  Language: uses words appropriately; NO aphasia or dysarthria  Mood: good  Affect:  reactive, slightly euphoric  Thought Process: normal and logical  Thought Content: normal, no suicidality, no homicidality, delusions, or paranoia  Level of Consciousness: Alert and Oriented x3  Memory:  Intact to conversation  Attention/concentration: Intact to conversation  Fund of Knowledge: appears adequate  Insight: Intact  Judgment: Intact         IMPRESSION:    Paul Li Jr. is a 47 y.o. male with history of metastatic triple negative breast cancer, HTN, anxiety, and T2DM who is admitted to BMU for suicidal ideations.     Status/Progress:  Based on the examination today, the patient's problem(s) is/are adequately but not ideally controlled.  New problems have been presented today.     DIAGNOSES:  Major depressive disorder, recurrent,  moderate  Unspecified anxiety disorder     PLAN:  Continue U treatment- group and individual therapies     Psych Med:  Continue Prozac 80mg daily.  Increase trazodone to 100mg nightly for sleep adjunct.   May continue xanax 0.5mg prn     Discussed with patient informed consent, risks vs. benefits, alternative treatments, side effect profile and the inherent unpredictability of individual responses to these treatments. Answered any questions patient may have had. The patient expresses understanding of the above and displays the capacity to agree with this current plan      3) Other: Labs/medical problems/ collateral- none needed       Jose Rafael Ross MD  [facility], PGY-4  10/25/2022

## 2022-10-25 NOTE — PROGRESS NOTES
" Group Psychotherapy (PhD/LCSW)    Site: [facility]    Clinical status of patient: Intensive Outpatient Program (IOP)    Date: 10/25/2022    Group Focus: DBT Skills    Length of service: 45-50 minutes    Number of patients in attendance: 5    Referred by: [facility]    Target symptoms: Depression and Anxiety    Patient's response to treatment: Active Listening and Self-disclosure    Progress toward goals: Progressing adequately    Interval History: Session focus was reviewing Mindfulness "what" skills. Patients engaged in mindful body scan.    Diagnosis:     ICD-10-CM ICD-9-CM   1. Adjustment disorder with mixed anxiety and depressed mood  F43.23 309.28           Plan: Continue treatment on [facility]    "

## 2022-10-25 NOTE — MEDICAL/APP STUDENT
"[facility] PROGRESS NOTE    PATIENT NAME: Paul Li Jr.  MRN: [id number]  ENCOUNTER DATE:  10/25/2022 9:24 AM   SITE:  U unit, [facility]  ADMIT DATE:   Pt Name: Paul Li Jr.   : 1975   MRN: [id number]      HISTORY OF PRESENTING ILLNESS:  Paul Li Jr. is a 47 y.o. male with history of metastatic triple negative breast cancer, HTN, anxiety, and T2DM who is admitted to BMU for suicidal ideations    Today,    Pt reports feeling "alright." Had fever after chemotherapy last Friday and was bed-ridden until yesterday.     Mood has been ok. Less upset moments. Able to recognize emotions and self-regulate much better and attributes this to decreased marital stress.    Some thoughts about upcoming surgery 10/29/22 (ie "what if I don't wake up from the anesthesia?") that patient states are unreasonable but frequent. Coping strategies include "shutting off those voices," rationalizing them, reassuring self.    States trazodone doesn't help fall asleep but has only been taking 1/2 dose. Frequent awakenings remains. Estimates total sleep at 4-5 hours/ night. Will consider taking sleep meds earlier in the night and doubling dose.     Risk Parameters:  Patient reports no suicidal ideation  Patient reports no homicidal ideation  Patient reports no self-injurious behavior  Patient reports no violent behavior    Current psych meds  Prozac  Trazadone  Medication side effects:  None    Current Substance use  Alcohol : Occasionally  Nicotine:  3.75 pack years  Illicit's:  Marijuana  Other Rx controlled substances (Ex-opiates, stimulants etc): None       PAST PSYCHIATRIC, MEDICAL, AND SOCIAL HISTORY REVIEWED  The patient's past medical, family and social history have been reviewed and updated as appropriate within the electronic medical record     MEDICAL REVIEW OF SYSTEMS  History obtained from the patient  General : NO chills or fever  Respiratory: NO cough, shortness of breath  Cardiovascular: NO " chest pain, palpitations or racing heart  Gastrointestinal: NO nausea, vomiting, constipation or diarrhea  Neurological: NO confusion, dizziness, headaches or tremors  Psychiatric: please see HPI     ALL MEDICATIONS:    Current Outpatient Medications:     alpelisib 300 mg/day (150 mg x 2) Tab, Take 2 tablets (300 mg) by mouth once daily., Disp: 60 tablet, Rfl: 3    ALPRAZolam (XANAX) 0.5 MG tablet, Take 1 tablet (0.5 mg total) by mouth 3 (three) times daily as needed for Insomnia or Anxiety., Disp: 60 tablet, Rfl: 0    amLODIPine (NORVASC) 10 MG tablet, TAKE 1 TABLET (10 MG) BY MOUTH EVERY DAY, Disp: 90 tablet, Rfl: 3    calcium-vitamin D3 ([place] CALCIUM-VIT D3) 500 mg-5 mcg (200 unit) per tablet, Take 1 tablet by mouth 2 (two) times daily., Disp: 180 tablet, Rfl: 3    diphenoxylate-atropine 2.5-0.025 mg (LOMOTIL) 2.5-0.025 mg per tablet, Take 1 tablet by mouth 4 (four) times daily as needed for Diarrhea., Disp: 60 tablet, Rfl: 2    dulaglutide (TRULICITY) 1.5 mg/0.5 mL pen injector, Inject 1.5 mg into the skin once a week., Disp: 4 pen, Rfl: 2    FLUoxetine 20 MG capsule, Take 2 capsules (40 mg total) by mouth once daily., Disp: 90 capsule, Rfl: 1    FLUoxetine 40 MG capsule, Take 2 capsules (80 mg total) by mouth once daily., Disp: 60 capsule, Rfl: 0    gabapentin (NEURONTIN) 300 MG capsule, Take 1 capsule (300 mg total) by mouth 3 (three) times daily., Disp: 90 capsule, Rfl: 11    hydroCHLOROthiazide (HYDRODIURIL) 25 MG tablet, TAKE 1 TABLET BY MOUTH ONCE DAILY, Disp: 90 tablet, Rfl: 3    HYDROcodone-acetaminophen (NORCO)  mg per tablet, Take 1 tablet by mouth every 6 (six) hours as needed for Pain., Disp: 90 tablet, Rfl: 0    insulin detemir U-100 (LEVEMIR FLEXTOUCH U-100 INSULN) 100 unit/mL (3 mL) InPn pen, Inject 21 Units into the skin every evening., Disp: 6 mL, Rfl: 2    insulin lispro (HUMALOG KWIKPEN INSULIN) 100 unit/mL pen, Inject 5 Units into the skin 3 (three) times daily., Disp: 6 mL,  "Rfl: 11    lancets 33 gauge Misc, 1 lancet by Misc.(Non-Drug; Combo Route) route once daily., Disp: 100 each, Rfl: 3    lancing device Misc, 1 Device by Misc.(Non-Drug; Combo Route) route 2 (two) times daily with meals., Disp: 1 each, Rfl: 0    LIDOcaine-prilocaine (EMLA) cream, Apply topically as needed., Disp: 30 g, Rfl: 0    losartan (COZAAR) 50 MG tablet, Take 2 tablets by mouth once daily, Disp: 180 tablet, Rfl: 3    metFORMIN (GLUCOPHAGE) 500 MG tablet, Take 2 tablets (1,000 mg total) by mouth 2 (two) times daily with meals. Needs appointment for future refills, Disp: 120 tablet, Rfl: 2    pen needle, diabetic (BD ULTRA-FINE TANESHA PEN NEEDLE) 32 gauge x 5/32" Ndle, Use as directed with novolog and levemir, Disp: 100 each, Rfl: 5    tadalafiL (CIALIS) 5 MG tablet, Take 2 tablets (10 mg total) by mouth daily as needed for Erectile Dysfunction., Disp: 20 tablet, Rfl: 1    traZODone (DESYREL) 50 MG tablet, Take 1 tablet (50 mg total) by mouth every evening., Disp: 30 tablet, Rfl: 0  No current facility-administered medications for this encounter.    Facility-Administered Medications Ordered in Other Encounters:     0.9%  NaCl infusion, , Intravenous, Continuous, Rosa Linn MD, Stopped at 11/19/21 1634    alteplase injection 2 mg, 2 mg, Intra-Catheter, PRN, Rosa Linn MD    heparin, porcine (PF) 100 unit/mL injection flush 500 Units, 500 Units, Intravenous, 1 time in Clinic/HOD, Richa Bahena MD    heparin, porcine (PF) 100 unit/mL injection flush 500 Units, 500 Units, Intravenous, PRN, Rosa Linn MD    heparin, porcine (PF) 100 unit/mL injection flush 500 Units, 500 Units, Intravenous, PRN, Rosa Linn MD, 500 Units at 11/19/21 1635    sodium chloride 0.9% 250 mL flush bag, , Intravenous, 1 time in Clinic/HOD, Rosa Linn MD    sodium chloride 0.9% flush 10 mL, 10 mL, Intravenous, 1 time in Clinic/KARY, Richa Bahena MD    sodium chloride 0.9% flush 10 mL, 10 mL, Intravenous, PRN, Rosa L. " "MD [place]it[place], 10 mL at 11/19/21 1501    sodium chloride 0.9% flush 10 mL, 10 mL, Intravenous, PRN, Rosa Linn MD, 10 mL at 11/19/21 1635    ALLERGIES:  Review of patient's allergies indicates:  No Known Allergies    RELEVANT LABS/STUDIES:    Lab Results   Component Value Date    WBC 3.15 (L) 10/20/2022    HGB 11.0 (L) 10/20/2022    HCT 36.2 (L) 10/20/2022    MCV 84 10/20/2022     10/20/2022     BMP  Lab Results   Component Value Date     10/20/2022    K 4.1 10/20/2022     10/20/2022    CO2 27 10/20/2022    BUN 12 10/20/2022    CREATININE 1.2 10/20/2022    CALCIUM 9.7 10/20/2022    ANIONGAP 8 10/20/2022    ESTGFRAFRICA >60.0 07/28/2022    EGFRNONAA >60.0 07/28/2022     Lab Results   Component Value Date    ALT 13 10/20/2022    AST 14 10/20/2022    ALKPHOS 86 10/20/2022    BILITOT 0.5 10/20/2022     Lab Results   Component Value Date    TSH 0.931 03/25/2022     Lab Results   Component Value Date    HGBA1C 6.3 (H) 05/26/2022       VITALS  defer to nursing note    PHYSICAL EXAM  General: well developed, well nourished  Neurologic:   Gait: Normal   Psychomotor signs:  No involuntary movements or tremor  AIMS: none    PSYCHIATRIC EXAM:     Mental Status Exam:  Appearance: {appearance:36728::"unremarkable","age appropriate"}  Behavior/Cooperation: {behavior:31353::"normal","cooperative"}  Speech: {findings; speech:35379::"normal tone","normal rate","normal pitch","normal volume"}  Language: uses words appropriately; NO aphasia or dysarthria  Mood: {mood:79478}  Affect  Thought Process: {thought process:55555::"normal and logical"}  Thought Content: {normal:15201::"normal, no suicidality, no homicidality, delusions, or paranoia"}  Level of Consciousness: Alert and Oriented x3  Memory:  Intact  Attention/concentration: appropriate for age/education.   Fund of Knowledge: appears adequate  Insight:  Impaired/  Limited/ Intact  Judgment: Impaired/  Limited/ Intact       IMPRESSION:    Paul Li Jr. is a " 47 y.o. male with history of *** who is admitted to BMU for ****    Status/Progress:  Based on the examination today, the patient's problem(s) is/are {Status:20256}.  New problems {HAVE/HAVE NOT:37566} been presented today.     DIAGNOSES:  No diagnosis found.    PLAN:    1) Continue BMU program with group and individual therapies.     2) Psych Med:  Continue **** / Start****    Discussed with patient informed consent, risks vs. benefits, alternative treatments, side effect profile and the inherent unpredictability of individual responses to these treatments. Answered any questions patient may have had. The patient expresses understanding of the above and displays the capacity to agree with this current plan     3) Other: Labs/medical problems/ collateral- none needed      JIHAN RIVERA MD   U Medical Director  [facility]  10/25/2022 9:24 AM

## 2022-10-26 ENCOUNTER — HOSPITAL ENCOUNTER (OUTPATIENT)
Dept: PSYCHIATRY | Facility: HOSPITAL | Age: 47
Discharge: HOME | End: 2022-10-26
Payer: COMMERCIAL

## 2022-10-26 VITALS
SYSTOLIC BLOOD PRESSURE: 138 MMHG | RESPIRATION RATE: 18 BRPM | HEART RATE: 76 BPM | TEMPERATURE: 98 F | DIASTOLIC BLOOD PRESSURE: 60 MMHG

## 2022-10-26 DIAGNOSIS — F43.23 ADJUSTMENT DISORDER WITH MIXED ANXIETY AND DEPRESSED MOOD: Primary | ICD-10-CM

## 2022-10-26 DIAGNOSIS — Z79.899 LONG-TERM USE OF HIGH-RISK MEDICATION: ICD-10-CM

## 2022-10-26 PROCEDURE — 90853 GROUP PSYCHOTHERAPY: CPT

## 2022-10-26 PROCEDURE — 90853 GROUP PSYCHOTHERAPY: CPT | Mod: 59,,, | Performed by: PSYCHOLOGIST

## 2022-10-26 PROCEDURE — 90853 GROUP PSYCHOTHERAPY: CPT | Mod: ,,, | Performed by: PSYCHOLOGIST

## 2022-10-26 PROCEDURE — 90853 PR GROUP PSYCHOTHERAPY: ICD-10-PCS | Mod: ,,, | Performed by: PSYCHOLOGIST

## 2022-10-26 PROCEDURE — 90853 PR GROUP PSYCHOTHERAPY: ICD-10-PCS | Mod: 59,,, | Performed by: PSYCHOLOGIST

## 2022-10-26 NOTE — PROGRESS NOTES
"Group Psychotherapy (PhD/LCSW)     Site: [facility]     Clinical status of patient: Intensive Outpatient Program (IOP)     Date:10/26/2022    Group Focus: Psychodynamic Process Group     Length of service: 13065 - 50 minutes     Number of patients in attendance: 8     Referred by: [facility] Team     Target symptoms: Mood Disorder     Patient's response to treatment: Active Listening and Self-disclosure     Progress toward goals: Progressing appropriately     Interval History: Patient shared he and his wife had an argument yesterday about the progress he is making in the program. He reports she doesn't like that he is focusing on himself so much and it makes her feel "put on the back burner." He was offered support by his peers.    Risk parameters:  Patient reports no suicidal ideation  Patient reports no homicidal ideation  Patient reports no self-injurious behavior  Patient reports no violent behavior    Diagnosis:     ICD-10-CM ICD-9-CM   1. Adjustment disorder with mixed anxiety and depressed mood  F43.23 309.28   2. Long-term use of high-risk medication  Z79.899 V58.69       Plan: Continue [facility] program  "

## 2022-10-26 NOTE — PATIENT CARE CONFERENCE
Diagnoses:    Major depressive disorder, recurrent, moderate  Unspecified anxiety disorder    Progress:    Patient was staffed by Team. Patient has been cooperative and appropriate during group sessions. Patient is supportive and offers feedback to other group members. Team will continue to follow up and monitor patient's progress.     Plan of Care:    Patient will continue [facility] program. Anticipating D/C on 10/27.    Aftercare/Follow ups:  Med Management:  Psychotherapy: 11/1 at 3 PM Matthew Sandoval    Staff present:  MD Sneha Leslie, PhD  Jose Rafael Ross MD, Resident  Sera Almaguer, MS3  Marely Chery, LCSW  KAYDEN MoellerW

## 2022-10-26 NOTE — PLAN OF CARE
10/26/22 1225   Activity/Group Therapy Checklist   Group Wellness   Attendance Attended   Follows Direction Followed directions   Group Interactions/Observations Interacted appropriately;Sharing;Supportive   Affect/Mood Range Normal range   Affect/Mood Display Appropriate   Goal Progression Progressing      and group reviewed Strengths and Qualities worksheet. Patient denied any questions or concerns at this time.

## 2022-10-26 NOTE — PROGRESS NOTES
Group Psychotherapy (PhD/LCSW)    Site: [facility]    Clinical status of patient: Intensive Outpatient Program (IOP)    Date: 10/26/2022    Group Focus: Distress Tolerance    Length of service: 92756 - 45-50 minutes    Number of patients in attendance: 12    Referred by: [facility]    Target symptoms: Depression and Anxiety    Patient's response to treatment: Active Listening and Self-disclosure    Progress toward goals: Progressing adequately    Interval History: Session focus was Distress Tolerance:  STOP.  Patients were encouraged to use crisis survival skills to reduce intensity of distress.  They were taught to STOP in the moment to reduce unhelpful action urges.    Diagnosis:     ICD-10-CM ICD-9-CM   1. Adjustment disorder with mixed anxiety and depressed mood  F43.23 309.28   2. Long-term use of high-risk medication  Z79.899 V58.69         Plan: Continue treatment on [facility]

## 2022-10-26 NOTE — PROGRESS NOTES
" Group Psychotherapy (PhD/LCSW)    Site: [facility]    Clinical status of patient: Intensive Outpatient Program (IOP)    Date: 10/26/2022    Group Focus: DBT Skills    Length of service: 45-50 minutes    Number of patients in attendance: 7    Referred by: [facility]    Target symptoms: Depression and Anxiety    Patient's response to treatment: Active Listening and Self-disclosure    Progress toward goals: Progressing adequately    Interval History: Session focus was reviewing Interpersonal Effective validation and Mindfulness "what" skills. Patients engaged in heartbeat mindfulness exercise.    Diagnosis:     ICD-10-CM ICD-9-CM   1. Adjustment disorder with mixed anxiety and depressed mood  F43.23 309.28   2. Long-term use of high-risk medication  Z79.899 V58.69       Plan: Continue treatment on [facility]    "

## 2022-10-27 ENCOUNTER — ANESTHESIA EVENT (OUTPATIENT)
Dept: SURGERY | Facility: HOSPITAL | Age: 47
End: 2022-10-27
Payer: MEDICARE

## 2022-10-27 ENCOUNTER — HOSPITAL ENCOUNTER (OUTPATIENT)
Dept: PSYCHIATRY | Facility: HOSPITAL | Age: 47
Discharge: HOME | End: 2022-10-27
Payer: COMMERCIAL

## 2022-10-27 DIAGNOSIS — Z79.899 LONG-TERM USE OF HIGH-RISK MEDICATION: ICD-10-CM

## 2022-10-27 DIAGNOSIS — F43.23 ADJUSTMENT DISORDER WITH MIXED ANXIETY AND DEPRESSED MOOD: Primary | ICD-10-CM

## 2022-10-27 PROCEDURE — 90853 GROUP PSYCHOTHERAPY: CPT | Mod: ,,, | Performed by: PSYCHOLOGIST

## 2022-10-27 PROCEDURE — 99231 SBSQ HOSP IP/OBS SF/LOW 25: CPT | Mod: ,,, | Performed by: PSYCHIATRY & NEUROLOGY

## 2022-10-27 PROCEDURE — 90853 PR GROUP PSYCHOTHERAPY: ICD-10-PCS | Mod: ,,, | Performed by: PSYCHOLOGIST

## 2022-10-27 PROCEDURE — 99231 PR SUBSEQUENT HOSPITAL CARE,LEVL I: ICD-10-PCS | Mod: ,,, | Performed by: PSYCHIATRY & NEUROLOGY

## 2022-10-27 RX ORDER — FLUOXETINE HYDROCHLORIDE 40 MG/1
80 CAPSULE ORAL DAILY
Qty: 60 CAPSULE | Refills: 1 | Status: SHIPPED | OUTPATIENT
Start: 2022-10-27 | End: 2023-11-09 | Stop reason: SDUPTHER

## 2022-10-27 NOTE — PROGRESS NOTES
Group Psychotherapy (PhD/LCSW)     [place] [facility]     Clinical status of patient: Intensive Outpatient Program (IOP)     Date:10/27/2022    Group Focus: Psychodynamic Process Group     Length of service: 71189 - 50 minutes     Number of patients in attendance: 5     Referred by: [facility] Team     Target symptoms: Mood Disorder     Patient's response to treatment: Active Listening and Self-disclosure     Progress toward goals: Progressing appropriately     Interval History: Patient shared he is sad to be discharging today. He feels as though he just started to get comfortable and willing to open up with everyone. He expressed appreciation for the support and skill building he received.     Risk parameters:  Patient reports no suicidal ideation  Patient reports no homicidal ideation  Patient reports no self-injurious behavior  Patient reports no violent behavior    Diagnosis:     ICD-10-CM ICD-9-CM   1. Adjustment disorder with mixed anxiety and depressed mood  F43.23 309.28   2. Long-term use of high-risk medication  Z79.899 V58.69       Plan: Completed program and is discharged.

## 2022-10-27 NOTE — PROGRESS NOTES
Group Psychotherapy (PhD/LCSW)    Site: [facility]    Clinical status of patient: Intensive Outpatient Program (IOP)    Date: 10/27/2022    Group Focus: Emotion Regulation Skills    Length of service: 20341 - 45-50 minutes    Number of patients in attendance: 12    Referred by: [facility]    Target symptoms: Depression and Anxiety    Patient's response to treatment: Active Listening and Self-disclosure    Progress toward goals: Progressing adequately    Interval History: Session focus was Emotion Regulation:  Problem-Solving.  Patients were encouraged to identify problems, check the facts, generate solutions, look at pros/cons, and create action steps to use the solution.    Diagnosis:     ICD-10-CM ICD-9-CM   1. Adjustment disorder with mixed anxiety and depressed mood  F43.23 309.28   2. Long-term use of high-risk medication  Z79.899 V58.69         Plan: Continue treatment on [facility]

## 2022-10-27 NOTE — DISCHARGE SUMMARY
"[facility] DISCHARGE SUMMARY    Discharge Date: 10/27/2022 1:02 PM   Pt Name: Paul Li Jr.   : 1975   MRN: [id number]     Admit Date:       SUBJECTIVE:  Patient is a 47 y.o. old, male, with past psychiatric history of metastatic triple negative breast cancer, HTN, anxiety, and T2DM who is admitted to BMU for suicidal ideations.    Program course-   Pt attended groups daily, was compliant with medication and psychotherapy. He endorsed significant benefit to skills learned in groups. Prozac dosage was increased to 80mg from 60mg, pt reported improved mood.  Trial of trazodone was not successful due to lack of improvement in sleep and s.e. of vivid tressa.   Pt throughout course endorsed difficulty navigating relationship with spouse, however endorsed improved ability and skills to navigate this. Pt was hopeful and focused on health.  Denied SI throughout course of program.      Today,   Pt reports feeling "good." Martial relations have been improved with wife.     States reluctance to take Trazodone due to "crazy dreams." Pills also not helpful for initiating sleep (taking pills at 8am but not able to fall asleep until 10am).      Friday, patient has PET scan and hydrocelectomy.     Very satisfied with group therapy. Able to "learn about myself and see a different perspective."     Risk Parameters:  Patient reports no suicidal ideation  Patient reports no homicidal ideation  Patient reports no self-injurious behavior  Patient reports no violent behavior    Allergies:   Review of patient's allergies indicates:  No Known Allergies      Current Medications:   Prozac 80mg daily.  Xanax as needed.        OBJECTIVE:   Vitals (Most Recent)  There were no vitals filed for this visit.; defer to nursing note      Labs/Imaging/Studies:   No results found for this or any previous visit (from the past 48 hour(s)).   No results found for: PHENYTOIN, PHENOBARB, VALPROATE, CBMZ    Mental Status " Exam:  Appearance: unremarkable, age appropriate, obese  Behavior/Cooperation: appropriate normal, cooperative,  Speech:  normal tone, normal rate, normal pitch, normal volume  Language: uses words appropriately; NO aphasia or dysarthria  Mood: good  Affect:  reactive, appropriate  Thought Process: normal and logical  Thought Content: normal, no suicidality, no homicidality, delusions, or paranoia  Level of Consciousness: Alert and Oriented x3  Memory:  Intact to conversation  Attention/concentration: Intact to conversation  Fund of Knowledge: appears adequate  Insight: Intact  Judgment: Intact        ASSESSMENT/PLAN:   General Assessment:  Patient is a 47 y.o., male, admitted to the BMU partial hospitalization program  for stabilization of  Depression, anxiety, SI.        Diagnosis:  Major depressive disorder, recurrent, moderate, in recent remission  Unspecified anxiety disorder      Plan:  1. Completed BMU treatment with much improvement in presenting symptoms and was encouraged to attend after care groups for better transition to out pt care.     2. Psych meds  Continue current med regimen, tolerating well with out any side effects. Pt provided with enough refills until follow up appointment.  Prozac 80mg daily  Continue xanax prn  Discussed with patient informed consent, risks vs. benefits, alternative treatments, side effect profile and the inherent unpredictability of individual responses to these treatments. Answered any questions patient may have had. The patient expresses understanding of the above and displays the capacity to  agree with this.     3. Patient Discharge Instructions and informed to:-  Follow up regularly with [facility] for further psychiatric care and management. Please see SW note for after care appointments  Refrain from using excessive alcohol, avoid any illicit substances and or abuse of prescription medications, as this may worsen mood and may be detrimental to  health.  Call the crisis line at: [phone] for help in a crisis and emergent situations and present to ED for any worsening of psychiatric symptoms or any SI/HI/AVH      Jose Rafael Ross MD  [facility], PGY-4  10/27/2022

## 2022-10-27 NOTE — MEDICAL/APP STUDENT
"[facility] PRO[facility] PROGRESS NOTE    PATIENT NAME: Paul Li Jr.  MRN: [id number]  ENCOUNTER DATE:  10/27/2022 9:34 AM   SITE:  U unit, [facility]  ADMIT DATE:   Pt Name: Paul Li Jr.   : 1975   MRN: [id number]      HISTORY OF PRESENTING ILLNESS:  Paul Li Jr. is a 47 y.o. male with history of metastatic triple negative breast cancer, HTN, anxiety, and T2DM who is admitted to BMU for suicidal ideations    Today,    Pt reports feeling "good." Martial relations have been improved with wife.    States reluctance to take Trazodone due to "crazy dreams." Pills also not helpful for initiating sleep (taking pills at 8am but not able to fall asleep until 10am).     Friday, patient has PET scan and hydrocelectomy.    Very satisfied with group therapy. Able to "learn about myself and see a different perspective."       Risk Parameters:  Patient reports no suicidal ideation  Patient reports no homicidal ideation  Patient reports no self-injurious behavior  Patient reports no violent behavior    Current psych meds  Prozac  Trazadone  Medication side effects:  disturbing dreams while on trazadone     Current Substance use  Alcohol : Occasionally  Nicotine:  3.75 pack years  Illicit's:  Marijuana  Other Rx controlled substances (Ex-opiates, stimulants etc): None       PAST PSYCHIATRIC, MEDICAL, AND SOCIAL HISTORY REVIEWED  The patient's past medical, family and social history have been reviewed and updated as appropriate within the electronic medical record     MEDICAL REVIEW OF SYSTEMS  History obtained from the patient  General : NO chills or fever  Respiratory: NO cough, shortness of breath  Cardiovascular: NO chest pain, palpitations or racing heart  Gastrointestinal: NO nausea, vomiting, constipation or diarrhea  Neurological: NO confusion, dizziness, headaches or tremors  Psychiatric: please see HPI     ALL MEDICATIONS:    Current Outpatient Medications:     alpelisib 300 mg/day (150 mg x 2) " Tab, Take 2 tablets (300 mg) by mouth once daily., Disp: 60 tablet, Rfl: 3    ALPRAZolam (XANAX) 0.5 MG tablet, Take 1 tablet (0.5 mg total) by mouth 3 (three) times daily as needed for Insomnia or Anxiety., Disp: 60 tablet, Rfl: 0    amLODIPine (NORVASC) 10 MG tablet, TAKE 1 TABLET (10 MG) BY MOUTH EVERY DAY, Disp: 90 tablet, Rfl: 3    calcium-vitamin D3 (OYSTER SHELL CALCIUM-VIT D3) 500 mg-5 mcg (200 unit) per tablet, Take 1 tablet by mouth 2 (two) times daily., Disp: 180 tablet, Rfl: 3    diphenoxylate-atropine 2.5-0.025 mg (LOMOTIL) 2.5-0.025 mg per tablet, Take 1 tablet by mouth 4 (four) times daily as needed for Diarrhea., Disp: 60 tablet, Rfl: 2    dulaglutide (TRULICITY) 1.5 mg/0.5 mL pen injector, Inject 1.5 mg into the skin once a week., Disp: 4 pen, Rfl: 2    FLUoxetine 20 MG capsule, Take 2 capsules (40 mg total) by mouth once daily., Disp: 90 capsule, Rfl: 1    FLUoxetine 40 MG capsule, Take 2 capsules (80 mg total) by mouth once daily., Disp: 60 capsule, Rfl: 0    gabapentin (NEURONTIN) 300 MG capsule, Take 1 capsule (300 mg total) by mouth 3 (three) times daily., Disp: 90 capsule, Rfl: 11    hydroCHLOROthiazide (HYDRODIURIL) 25 MG tablet, TAKE 1 TABLET BY MOUTH ONCE DAILY, Disp: 90 tablet, Rfl: 3    HYDROcodone-acetaminophen (NORCO)  mg per tablet, Take 1 tablet by mouth every 6 (six) hours as needed for Pain., Disp: 90 tablet, Rfl: 0    insulin detemir U-100 (LEVEMIR FLEXTOUCH U-100 INSULN) 100 unit/mL (3 mL) InPn pen, Inject 21 Units into the skin every evening., Disp: 6 mL, Rfl: 2    insulin lispro (HUMALOG KWIKPEN INSULIN) 100 unit/mL pen, Inject 5 Units into the skin 3 (three) times daily., Disp: 6 mL, Rfl: 11    lancets 33 gauge Misc, 1 lancet by Misc.(Non-Drug; Combo Route) route once daily., Disp: 100 each, Rfl: 3    lancing device Misc, 1 Device by Misc.(Non-Drug; Combo Route) route 2 (two) times daily with meals., Disp: 1 each, Rfl: 0    LIDOcaine-prilocaine (EMLA) cream, Apply  "topically as needed., Disp: 30 g, Rfl: 0    losartan (COZAAR) 50 MG tablet, Take 2 tablets by mouth once daily, Disp: 180 tablet, Rfl: 3    metFORMIN (GLUCOPHAGE) 500 MG tablet, Take 2 tablets (1,000 mg total) by mouth 2 (two) times daily with meals. Needs appointment for future refills, Disp: 120 tablet, Rfl: 2    pen needle, diabetic (BD ULTRA-FINE TANESHA PEN NEEDLE) 32 gauge x 5/32" Ndle, Use as directed with novolog and levemir, Disp: 100 each, Rfl: 5    tadalafiL (CIALIS) 5 MG tablet, Take 2 tablets (10 mg total) by mouth daily as needed for Erectile Dysfunction., Disp: 20 tablet, Rfl: 1    traZODone (DESYREL) 50 MG tablet, Take 1 tablet (50 mg total) by mouth every evening., Disp: 30 tablet, Rfl: 0  No current facility-administered medications for this encounter.    Facility-Administered Medications Ordered in Other Encounters:     0.9%  NaCl infusion, , Intravenous, Continuous, Rosa Linn MD, Stopped at 11/19/21 1634    alteplase injection 2 mg, 2 mg, Intra-Catheter, PRN, Rosa Linn MD    heparin, porcine (PF) 100 unit/mL injection flush 500 Units, 500 Units, Intravenous, 1 time in Clinic/HOD, Richa Bahena MD    heparin, porcine (PF) 100 unit/mL injection flush 500 Units, 500 Units, Intravenous, PRN, Rosa Linn MD    heparin, porcine (PF) 100 unit/mL injection flush 500 Units, 500 Units, Intravenous, PRN, Rosa Linn MD, 500 Units at 11/19/21 1635    sodium chloride 0.9% 250 mL flush bag, , Intravenous, 1 time in Clinic/HOD, Rosa Linn MD    sodium chloride 0.9% flush 10 mL, 10 mL, Intravenous, 1 time in Clinic/HOD, Richa Bahena MD    sodium chloride 0.9% flush 10 mL, 10 mL, Intravenous, PRN, Rosa Linn MD, 10 mL at 11/19/21 1501    sodium chloride 0.9% flush 10 mL, 10 mL, Intravenous, PRN, Rosa Linn MD, 10 mL at 11/19/21 9821    ALLERGIES:  Review of patient's allergies indicates:  No Known Allergies    RELEVANT LABS/STUDIES:    Lab Results   Component Value Date    WBC 3.15 " "(L) 10/20/2022    HGB 11.0 (L) 10/20/2022    HCT 36.2 (L) 10/20/2022    MCV 84 10/20/2022     10/20/2022     BMP  Lab Results   Component Value Date     10/20/2022    K 4.1 10/20/2022     10/20/2022    CO2 27 10/20/2022    BUN 12 10/20/2022    CREATININE 1.2 10/20/2022    CALCIUM 9.7 10/20/2022    ANIONGAP 8 10/20/2022    ESTGFRAFRICA >60.0 07/28/2022    EGFRNONAA >60.0 07/28/2022     Lab Results   Component Value Date    ALT 13 10/20/2022    AST 14 10/20/2022    ALKPHOS 86 10/20/2022    BILITOT 0.5 10/20/2022     Lab Results   Component Value Date    TSH 0.931 03/25/2022     Lab Results   Component Value Date    HGBA1C 6.3 (H) 05/26/2022       VITALS  defer to nursing note    PHYSICAL EXAM  General: well developed, well nourished  Neurologic:   Gait: Normal   Psychomotor signs:  No involuntary movements or tremor  AIMS: none    PSYCHIATRIC EXAM:     Mental Status Exam:  Appearance: {appearance:46212::"unremarkable","age appropriate"}  Behavior/Cooperation: {behavior:04811::"normal","cooperative"}  Speech: {findings; speech:77200::"normal tone","normal rate","normal pitch","normal volume"}  Language: uses words appropriately; NO aphasia or dysarthria  Mood: {mood:10263}  Affect  Thought Process: {thought process:42224::"normal and logical"}  Thought Content: {normal:05270::"normal, no suicidality, no homicidality, delusions, or paranoia"}  Level of Consciousness: Alert and Oriented x3  Memory:  Intact  Attention/concentration: appropriate for age/education.   Fund of Knowledge: appears adequate  Insight:  Impaired/  Limited/ Intact  Judgment: Impaired/  Limited/ Intact       IMPRESSION:    Paul Li Jr. is a 47 y.o. male with history of metastatic triple negative breast cancer, HTN, anxiety, and T2DM who is admitted to BMU for suicidal ideations       Status/Progress:  Based on the examination today, the patient's problem(s) is/are well controlled and resolving.  New problems have not been " presented today.     DIAGNOSES:  No diagnosis found.    PLAN:    1) Continue BMU program with group and individual therapies.     2) Psych Med:  Continue **** / Start****    Discussed with patient informed consent, risks vs. benefits, alternative treatments, side effect profile and the inherent unpredictability of individual responses to these treatments. Answered any questions patient may have had. The patient expresses understanding of the above and displays the capacity to agree with this current plan     3) Other: Labs/medical problems/ collateral- none needed      JIHAN RIVERA MD   [facility] [place] [facility]  [facility]  10/27/2022 9:34 AM

## 2022-10-28 ENCOUNTER — ANESTHESIA (OUTPATIENT)
Dept: SURGERY | Facility: HOSPITAL | Age: 47
End: 2022-10-28
Payer: MEDICARE

## 2022-10-28 ENCOUNTER — HOSPITAL ENCOUNTER (OUTPATIENT)
Facility: HOSPITAL | Age: 47
Discharge: HOME | End: 2022-10-28
Attending: UROLOGY | Admitting: UROLOGY
Payer: MEDICARE

## 2022-10-28 VITALS
BODY MASS INDEX: 40.43 KG/M2 | WEIGHT: 315 LBS | HEIGHT: 74 IN | OXYGEN SATURATION: 98 % | DIASTOLIC BLOOD PRESSURE: 70 MMHG | SYSTOLIC BLOOD PRESSURE: 151 MMHG | TEMPERATURE: 98 F | RESPIRATION RATE: 18 BRPM | HEART RATE: 65 BPM

## 2022-10-28 DIAGNOSIS — N43.3 HYDROCELE: Primary | ICD-10-CM

## 2022-10-28 LAB — POCT GLUCOSE: 96 MG/DL (ref 70–110)

## 2022-10-28 PROCEDURE — 25000003 PHARM REV CODE 250: Performed by: UROLOGY

## 2022-10-28 PROCEDURE — 71000044 HC DOSC ROUTINE RECOVERY FIRST HOUR: Performed by: UROLOGY

## 2022-10-28 PROCEDURE — 55040 REMOVAL OF HYDROCELE: CPT | Mod: RT,,, | Performed by: UROLOGY

## 2022-10-28 PROCEDURE — 36000707: Performed by: UROLOGY

## 2022-10-28 PROCEDURE — D9220A PRA ANESTHESIA: Mod: CRNA,,, | Performed by: NURSE ANESTHETIST, CERTIFIED REGISTERED

## 2022-10-28 PROCEDURE — 37000009 HC ANESTHESIA EA ADD 15 MINS: Performed by: UROLOGY

## 2022-10-28 PROCEDURE — 82962 GLUCOSE BLOOD TEST: CPT | Performed by: UROLOGY

## 2022-10-28 PROCEDURE — D9220A PRA ANESTHESIA: Mod: ANES,,, | Performed by: ANESTHESIOLOGY

## 2022-10-28 PROCEDURE — 63600175 PHARM REV CODE 636 W HCPCS: Performed by: ANESTHESIOLOGY

## 2022-10-28 PROCEDURE — 25000003 PHARM REV CODE 250: Performed by: NURSE ANESTHETIST, CERTIFIED REGISTERED

## 2022-10-28 PROCEDURE — 71000016 HC POSTOP RECOV ADDL HR: Performed by: UROLOGY

## 2022-10-28 PROCEDURE — D9220A PRA ANESTHESIA: ICD-10-PCS | Mod: ANES,,, | Performed by: ANESTHESIOLOGY

## 2022-10-28 PROCEDURE — D9220A PRA ANESTHESIA: ICD-10-PCS | Mod: CRNA,,, | Performed by: NURSE ANESTHETIST, CERTIFIED REGISTERED

## 2022-10-28 PROCEDURE — 63600175 PHARM REV CODE 636 W HCPCS: Performed by: NURSE ANESTHETIST, CERTIFIED REGISTERED

## 2022-10-28 PROCEDURE — 36000706: Performed by: UROLOGY

## 2022-10-28 PROCEDURE — 88302 TISSUE EXAM BY PATHOLOGIST: CPT | Performed by: PATHOLOGY

## 2022-10-28 PROCEDURE — 37000008 HC ANESTHESIA 1ST 15 MINUTES: Performed by: UROLOGY

## 2022-10-28 PROCEDURE — 88302 PR  SURG PATH,LEVEL II: ICD-10-PCS | Mod: 26,,, | Performed by: PATHOLOGY

## 2022-10-28 PROCEDURE — 25000003 PHARM REV CODE 250: Performed by: STUDENT IN AN ORGANIZED HEALTH CARE EDUCATION/TRAINING PROGRAM

## 2022-10-28 PROCEDURE — 71000015 HC POSTOP RECOV 1ST HR: Performed by: UROLOGY

## 2022-10-28 PROCEDURE — C1729 CATH, DRAINAGE: HCPCS | Performed by: UROLOGY

## 2022-10-28 PROCEDURE — 88302 TISSUE EXAM BY PATHOLOGIST: CPT | Mod: 26,,, | Performed by: PATHOLOGY

## 2022-10-28 PROCEDURE — 55040 PR REMOVAL OF HYDROCELE,TUNICA,UNILAT: ICD-10-PCS | Mod: RT,,, | Performed by: UROLOGY

## 2022-10-28 PROCEDURE — 25000003 PHARM REV CODE 250: Performed by: ANESTHESIOLOGY

## 2022-10-28 RX ORDER — MIDAZOLAM HYDROCHLORIDE 1 MG/ML
INJECTION, SOLUTION INTRAMUSCULAR; INTRAVENOUS
Status: DISCONTINUED | OUTPATIENT
Start: 2022-10-28 | End: 2022-10-28

## 2022-10-28 RX ORDER — CEFAZOLIN SODIUM/WATER 2 G/20 ML
2 SYRINGE (ML) INTRAVENOUS
Status: COMPLETED | OUTPATIENT
Start: 2022-10-28 | End: 2022-10-28

## 2022-10-28 RX ORDER — SODIUM CHLORIDE 0.9 % (FLUSH) 0.9 %
10 SYRINGE (ML) INJECTION
Status: DISCONTINUED | OUTPATIENT
Start: 2022-10-28 | End: 2022-10-28 | Stop reason: HOSPADM

## 2022-10-28 RX ORDER — BUPIVACAINE HYDROCHLORIDE 2.5 MG/ML
INJECTION, SOLUTION EPIDURAL; INFILTRATION; INTRACAUDAL
Status: DISCONTINUED | OUTPATIENT
Start: 2022-10-28 | End: 2022-10-28 | Stop reason: HOSPADM

## 2022-10-28 RX ORDER — FENTANYL CITRATE 50 UG/ML
25 INJECTION, SOLUTION INTRAMUSCULAR; INTRAVENOUS EVERY 5 MIN PRN
Status: DISCONTINUED | OUTPATIENT
Start: 2022-10-28 | End: 2022-10-28 | Stop reason: HOSPADM

## 2022-10-28 RX ORDER — ONDANSETRON 2 MG/ML
INJECTION INTRAMUSCULAR; INTRAVENOUS
Status: DISCONTINUED | OUTPATIENT
Start: 2022-10-28 | End: 2022-10-28

## 2022-10-28 RX ORDER — FENTANYL CITRATE 50 UG/ML
INJECTION, SOLUTION INTRAMUSCULAR; INTRAVENOUS
Status: DISCONTINUED | OUTPATIENT
Start: 2022-10-28 | End: 2022-10-28

## 2022-10-28 RX ORDER — DEXMEDETOMIDINE HYDROCHLORIDE 100 UG/ML
INJECTION, SOLUTION INTRAVENOUS
Status: DISCONTINUED | OUTPATIENT
Start: 2022-10-28 | End: 2022-10-28

## 2022-10-28 RX ORDER — LIDOCAINE HYDROCHLORIDE 10 MG/ML
1 INJECTION, SOLUTION EPIDURAL; INFILTRATION; INTRACAUDAL; PERINEURAL ONCE
Status: DISCONTINUED | OUTPATIENT
Start: 2022-10-28 | End: 2022-10-28 | Stop reason: HOSPADM

## 2022-10-28 RX ORDER — DEXAMETHASONE SODIUM PHOSPHATE 4 MG/ML
INJECTION, SOLUTION INTRA-ARTICULAR; INTRALESIONAL; INTRAMUSCULAR; INTRAVENOUS; SOFT TISSUE
Status: DISCONTINUED | OUTPATIENT
Start: 2022-10-28 | End: 2022-10-28

## 2022-10-28 RX ORDER — PROPOFOL 10 MG/ML
VIAL (ML) INTRAVENOUS
Status: DISCONTINUED | OUTPATIENT
Start: 2022-10-28 | End: 2022-10-28

## 2022-10-28 RX ORDER — OXYCODONE HYDROCHLORIDE 5 MG/1
5 TABLET ORAL
Status: DISCONTINUED | OUTPATIENT
Start: 2022-10-28 | End: 2022-10-28 | Stop reason: HOSPADM

## 2022-10-28 RX ORDER — LIDOCAINE HYDROCHLORIDE 20 MG/ML
INJECTION, SOLUTION EPIDURAL; INFILTRATION; INTRACAUDAL; PERINEURAL
Status: DISCONTINUED | OUTPATIENT
Start: 2022-10-28 | End: 2022-10-28

## 2022-10-28 RX ORDER — ONDANSETRON 2 MG/ML
4 INJECTION INTRAMUSCULAR; INTRAVENOUS DAILY PRN
Status: DISCONTINUED | OUTPATIENT
Start: 2022-10-28 | End: 2022-10-28 | Stop reason: HOSPADM

## 2022-10-28 RX ORDER — OXYCODONE HYDROCHLORIDE 5 MG/1
5 TABLET ORAL EVERY 4 HOURS PRN
Qty: 5 TABLET | Refills: 0 | Status: ON HOLD | OUTPATIENT
Start: 2022-10-28 | End: 2023-01-10 | Stop reason: SDUPTHER

## 2022-10-28 RX ADMIN — MIDAZOLAM 2 MG: 1 INJECTION INTRAMUSCULAR; INTRAVENOUS at 08:10

## 2022-10-28 RX ADMIN — OXYCODONE 5 MG: 5 TABLET ORAL at 10:10

## 2022-10-28 RX ADMIN — DEXMEDETOMIDINE HYDROCHLORIDE 8 MCG: 100 INJECTION, SOLUTION INTRAVENOUS at 08:10

## 2022-10-28 RX ADMIN — ONDANSETRON 4 MG: 2 INJECTION INTRAMUSCULAR; INTRAVENOUS at 08:10

## 2022-10-28 RX ADMIN — Medication 2 G: at 08:10

## 2022-10-28 RX ADMIN — FENTANYL CITRATE 25 MCG: 0.05 INJECTION, SOLUTION INTRAMUSCULAR; INTRAVENOUS at 10:10

## 2022-10-28 RX ADMIN — PROPOFOL 100 MG: 10 INJECTION, EMULSION INTRAVENOUS at 08:10

## 2022-10-28 RX ADMIN — FENTANYL CITRATE 25 MCG: 50 INJECTION, SOLUTION INTRAMUSCULAR; INTRAVENOUS at 08:10

## 2022-10-28 RX ADMIN — FENTANYL CITRATE 25 MCG: 50 INJECTION, SOLUTION INTRAMUSCULAR; INTRAVENOUS at 09:10

## 2022-10-28 RX ADMIN — DEXAMETHASONE SODIUM PHOSPHATE 4 MG: 4 INJECTION INTRA-ARTICULAR; INTRALESIONAL; INTRAMUSCULAR; INTRAVENOUS; SOFT TISSUE at 08:10

## 2022-10-28 RX ADMIN — SODIUM CHLORIDE: 0.9 INJECTION, SOLUTION INTRAVENOUS at 08:10

## 2022-10-28 RX ADMIN — PROPOFOL 200 MG: 10 INJECTION, EMULSION INTRAVENOUS at 08:10

## 2022-10-28 RX ADMIN — SODIUM CHLORIDE, SODIUM GLUCONATE, SODIUM ACETATE, POTASSIUM CHLORIDE, MAGNESIUM CHLORIDE, SODIUM PHOSPHATE, DIBASIC, AND POTASSIUM PHOSPHATE: .53; .5; .37; .037; .03; .012; .00082 INJECTION, SOLUTION INTRAVENOUS at 08:10

## 2022-10-28 RX ADMIN — LIDOCAINE HYDROCHLORIDE 60 MG: 20 INJECTION, SOLUTION EPIDURAL; INFILTRATION; INTRACAUDAL; PERINEURAL at 08:10

## 2022-10-28 NOTE — TRANSFER OF CARE
"Anesthesia Transfer of Care Note    Patient: Paul Li Jr.    Procedure(s) Performed: Procedure(s) (LRB):  HYDROCELECTOMY (Right)    Patient location: PACU    Anesthesia Type: general    Transport from OR: Transported from OR on 6-10 L/min O2 by face mask with adequate spontaneous ventilation    Post pain: adequate analgesia    Post assessment: tolerated procedure well and no apparent anesthetic complications    Post vital signs: stable    Level of consciousness: awake    Nausea/Vomiting: no nausea/vomiting    Complications: none    Transfer of care protocol was followed      Last vitals:   Visit Vitals  BP (!) 120/58   Pulse 72   Temp 36.4 °C (97.5 °F) (Temporal)   Resp 18   Ht 6' 2" (1.88 m)   Wt (!) 148.3 kg (326 lb 15.1 oz)   SpO2 100%   BMI 41.98 kg/m²     "

## 2022-10-28 NOTE — PLAN OF CARE
Discharge instructions given, patient and family verbalize understanding. Vital signs stable. All questions answered.

## 2022-10-28 NOTE — ANESTHESIA PREPROCEDURE EVALUATION
10/28/2022  Paul Li Jr. is a 47 y.o., male.      Pre-op Assessment    I have reviewed the Patient Summary Reports.     I have reviewed the Nursing Notes.    I have reviewed the Medications.     Review of Systems  Anesthesia Hx:  No problems with previous Anesthesia  Denies Family Hx of Anesthesia complications.    Cardiovascular:   Exercise tolerance: good Hypertension    Pulmonary:   Sleep Apnea    Neurological:   Seizures    Endocrine:   Diabetes, type 2    Psych:   Psychiatric History        Past Medical History:   Diagnosis Date    Breast cancer     Cancer     R breast    Diabetes mellitus     HTN (hypertension)     Leg edema, right     Prediabetes     Seizures     at age 18 - stress related    Therapy     marital counseling     Past Surgical History:   Procedure Laterality Date    AXILLARY NODE DISSECTION Right 11/22/2019    Procedure: LYMPHADENECTOMY, AXILLARY RIGHT;  Surgeon: Shima Whyte MD;  Location: [facility];  Service: General;  Laterality: Right;    AXILLARY NODE DISSECTION Right 12/17/2019    Procedure: LYMPHADENECTOMY, AXILLARY;  Surgeon: Shima Whyte MD;  Location: [facility] [address] [place] [address];  Service: General;  Laterality: Right;    BREAST BIOPSY      INSERTION OF TUNNELED CENTRAL VENOUS CATHETER (CVC) WITH SUBCUTANEOUS PORT Left 5/24/2019    Procedure: FWBWONSIA-ROIK-X-CATH;  Surgeon: Shima Whyte MD;  Location: [facility] [address] [place] [address];  Service: General;  Laterality: Left;    INSERTION OF TUNNELED CENTRAL VENOUS CATHETER (CVC) WITH SUBCUTANEOUS PORT Left 9/17/2021    Procedure: INSERTION, SINGLE LUMEN CATHETER WITH PORT, WITH FLUOROSCOPIC GUIDANCE, right;  Surgeon: Gloria Holliday MD;  Location: [facility] [address] [place] [address];  Service: General;  Laterality: Left;  rapid covid test    SENTINEL LYMPH NODE BIOPSY Right 11/22/2019    Procedure: BIOPSY, LYMPH NODE, SENTINEL RIGHT;  Surgeon: Shima  MD Pato;  Location: [facility];  Service: General;  Laterality: Right;    SIMPLE MASTECTOMY Right 11/22/2019    Procedure: MASTECTOMY, SIMPLE RIGHT (CONSENT AM OF) 2.5 hr case;  Surgeon: Shima Whyte MD;  Location: [facility];  Service: General;  Laterality: Right;     Patient Active Problem List   Diagnosis    Essential hypertension    Gout    Malignant neoplasm involving both nipple and areola of right breast in male, estrogen receptor negative    Microcytic anemia    Morbid obesity with BMI of 50.0-59.9, adult    Adjustment disorder with mixed anxiety and depressed mood    Psychophysiological insomnia    Leukopenia due to antineoplastic chemotherapy    Class 3 severe obesity due to excess calories with serious comorbidity and body mass index (BMI) of 50.0 to 59.9 in adult    Type 2 diabetes mellitus without complication    Decreased shoulder mobility, right    Chemotherapy-induced neutropenia    Uncontrolled type 2 diabetes mellitus with hyperglycemia    Sleep apnea    Sexual dysfunction    Morbid obesity    Bone metastases    Elevated serum creatinine    Hyperbilirubinemia    Anemia associated with chemotherapy    Neuropathy    Chemotherapy management, encounter for    Lymphedema of right upper extremity    Lymphedema of right arm    Impaired functional mobility and activity tolerance    Poor posture    Long-term use of high-risk medication     Facility-Administered Medications as of 10/28/2022   Medication Dose Route Frequency Provider Last Rate Last Admin    0.9%  NaCl infusion   Intravenous Continuous Rosa Linn MD   Stopped at 11/19/21 1634    alteplase injection 2 mg  2 mg Intra-Catheter PRN Rosa Linn MD        ceFAZolin in sterile water 2 gram/20 mL IV syringe 2,000 mg  2 g Intravenous On Call Procedure Delfin Yan MD        heparin, porcine (PF) 100 unit/mL injection flush 500 Units  500 Units Intravenous 1 time in Clinic/HOD Richa Bahena MD        heparin,  porcine (PF) 100 unit/mL injection flush 500 Units  500 Units Intravenous PRN Rosa Linn MD        heparin, porcine (PF) 100 unit/mL injection flush 500 Units  500 Units Intravenous PRN Rosa Linn MD   500 Units at 11/19/21 1635    LIDOcaine (PF) 10 mg/ml (1%) injection 10 mg  1 mL Intradermal Once Delfin Yan MD        sodium chloride 0.9% 250 mL flush bag   Intravenous 1 time in Clinic/HOD Rosa Linn MD        sodium chloride 0.9% flush 10 mL  10 mL Intravenous 1 time in Clinic/HOD Richa Bahena MD        sodium chloride 0.9% flush 10 mL  10 mL Intravenous PRN Rosa Linn MD   10 mL at 11/19/21 1501    sodium chloride 0.9% flush 10 mL  10 mL Intravenous PRN Rosa Linn MD   10 mL at 11/19/21 1635     Outpatient Medications as of 10/28/2022   Medication Sig Dispense Refill    alpelisib 300 mg/day (150 mg x 2) Tab Take 2 tablets (300 mg) by mouth once daily. 60 tablet 3    ALPRAZolam (XANAX) 0.5 MG tablet Take 1 tablet (0.5 mg total) by mouth 3 (three) times daily as needed for Insomnia or Anxiety. 60 tablet 0    amLODIPine (NORVASC) 10 MG tablet TAKE 1 TABLET (10 MG) BY MOUTH EVERY DAY 90 tablet 3    calcium-vitamin D3 (OYSTER SHELL CALCIUM-VIT D3) 500 mg-5 mcg (200 unit) per tablet Take 1 tablet by mouth 2 (two) times daily. 180 tablet 3    diphenoxylate-atropine 2.5-0.025 mg (LOMOTIL) 2.5-0.025 mg per tablet Take 1 tablet by mouth 4 (four) times daily as needed for Diarrhea. 60 tablet 2    dulaglutide (TRULICITY) 1.5 mg/0.5 mL pen injector Inject 1.5 mg into the skin once a week. 4 pen 2    FLUoxetine 20 MG capsule Take 2 capsules (40 mg total) by mouth once daily. 90 capsule 1    gabapentin (NEURONTIN) 300 MG capsule Take 1 capsule (300 mg total) by mouth 3 (three) times daily. 90 capsule 11    hydroCHLOROthiazide (HYDRODIURIL) 25 MG tablet TAKE 1 TABLET BY MOUTH ONCE DAILY 90 tablet 3    HYDROcodone-acetaminophen (NORCO)  mg per tablet Take 1 tablet by mouth every  "6 (six) hours as needed for Pain. 90 tablet 0    losartan (COZAAR) 50 MG tablet Take 2 tablets by mouth once daily 180 tablet 3    metFORMIN (GLUCOPHAGE) 500 MG tablet Take 2 tablets (1,000 mg total) by mouth 2 (two) times daily with meals. Needs appointment for future refills 120 tablet 2    insulin lispro (HUMALOG KWIKPEN INSULIN) 100 unit/mL pen Inject 5 Units into the skin 3 (three) times daily. 6 mL 11    lancets 33 gauge Misc 1 lancet by Misc.(Non-Drug; Combo Route) route once daily. 100 each 3    LIDOcaine-prilocaine (EMLA) cream Apply topically as needed. 30 g 0    pen needle, diabetic (BD ULTRA-FINE TANESHA PEN NEEDLE) 32 gauge x 5/32" Ndle Use as directed with novolog and levemir 100 each 5    tadalafiL (CIALIS) 5 MG tablet Take 2 tablets (10 mg total) by mouth daily as needed for Erectile Dysfunction. 20 tablet 1         Physical Exam  General: Well nourished, Cooperative, Alert and Oriented    Airway:  Mallampati: II   Mouth Opening: Normal  TM Distance: Normal  Tongue: Normal  Neck ROM: Normal ROM    Chest/Lungs:  Normal Respiratory Rate    Heart:  Rate: Normal      Wt Readings from Last 3 Encounters:   10/17/22 (!) 148.3 kg (327 lb)   10/11/22 (!) 148.4 kg (327 lb 2.6 oz)   10/06/22 (!) 150.5 kg (331 lb 12.7 oz)     Temp Readings from Last 3 Encounters:   10/20/22 36.6 °C (97.8 °F)   10/13/22 36.6 °C (97.8 °F)   10/06/22 36.7 °C (98.1 °F)     BP Readings from Last 3 Encounters:   10/20/22 121/68   10/13/22 (!) 119/59   10/11/22 127/68     Pulse Readings from Last 3 Encounters:   10/20/22 61   10/13/22 62   10/11/22 70     Lab Results   Component Value Date    WBC 3.15 (L) 10/20/2022    HGB 11.0 (L) 10/20/2022    HCT 36.2 (L) 10/20/2022    MCV 84 10/20/2022     10/20/2022         Chemistry        Component Value Date/Time     10/20/2022 0930    K 4.1 10/20/2022 0930     10/20/2022 0930    CO2 27 10/20/2022 0930    BUN 12 10/20/2022 0930    CREATININE 1.2 10/20/2022 0930     " (H) 10/20/2022 0930        Component Value Date/Time    CALCIUM 9.7 10/20/2022 0930    ALKPHOS 86 10/20/2022 0930    AST 14 10/20/2022 0930    ALT 13 10/20/2022 0930    BILITOT 0.5 10/20/2022 0930    ESTGFRAFRICA >60.0 07/28/2022 1008    EGFRNONAA >60.0 07/28/2022 1008          Echo 8/2019  Summary     Normal left ventricular systolic function 65% ( QEF 69%).   Strain imaging performed in left ventricle. Global longitudinal strain is -15%.   Concentric left ventricular remodeling.   Normal LV diastolic function.   Normal right ventricular systolic function.   Normal central venous pressure (3 mm Hg).        Anesthesia Plan  Type of Anesthesia, risks & benefits discussed:    Anesthesia Type: Gen ETT, Gen Supraglottic Airway  Intra-op Monitoring Plan: Standard ASA Monitors  Post Op Pain Control Plan: multimodal analgesia and IV/PO Opioids PRN  Induction:  IV  Airway Plan: Direct  Informed Consent: Informed consent signed with the Patient and all parties understand the risks and agree with anesthesia plan.  All questions answered.   ASA Score: 3  Day of Surgery Review of History & Physical: H&P Update referred to the surgeon/provider.    Ready For Surgery From Anesthesia Perspective.     .

## 2022-10-28 NOTE — BRIEF OP NOTE
[facility] ([facility])  Brief Operative Note    Surgery Date: 10/28/2022     Surgeon(s) and Role:     * Lenora Brink Jr., MD - Primary     * Delfin Yan MD - Resident - Assisting        Pre-op Diagnosis:  Hydrocele in adult [N43.3]    Post-op Diagnosis:  Post-Op Diagnosis Codes:     * Hydrocele in adult [N43.3]    Procedure(s) (LRB):  HYDROCELECTOMY (Right)    Anesthesia: General    Operative Findings:  - 500 cc clear fluid drained from right hydrocele  - Excision of hydrocele sac with oversewing  - Good hemostasis at conclusion  - No drain placed    Estimated Blood Loss: * No values recorded between 10/28/2022  8:44 AM and 10/28/2022  9:44 AM *         Specimens:   Specimen (24h ago, onward)       Start     Ordered    10/28/22 0858  Specimen to Pathology, Surgery General Surgery  Once        Comments: Pre-op Diagnosis: Hydrocele in adult [N43.3]Procedure(s):HYDROCELECTOMY Number of specimens: 1Name of specimens: hydrocele sac - permanent     References:    Click here for ordering Quick Tip   Question Answer Comment   Procedure Type: General Surgery    Which provider would you like to cc? LENORA BRINK JR    Release to patient Immediate        10/28/22 0902                      Discharge Note    OUTCOME: Patient tolerated treatment/procedure well without complication and is now ready for discharge.    DISPOSITION: Home or Self Care    FINAL DIAGNOSIS:  Hydrocele in adult    FOLLOWUP: In clinic    DISCHARGE INSTRUCTIONS:    Discharge Procedure Orders   Diet Adult Regular     Activity as tolerated

## 2022-10-28 NOTE — ANESTHESIA PROCEDURE NOTES
Intubation    Date/Time: 10/28/2022 8:34 AM  Performed by: Mallory Anderson CRNA  Authorized by: Ava Lima MD     Intubation:     Induction:  Intravenous    Intubated:  Postinduction    Mask Ventilation:  Easy with oral airway    Attempts:  1    Attempted By:  CRNA    Difficult Airway Encountered?: No      Complications:  None    Airway Device:  Supraglottic airway/LMA    Airway Device Size:  4.5    Style/Cuff Inflation:  Cuffed    Secured at:  The lips    Placement Verified By:  Capnometry    Complicating Factors:  None    Findings Post-Intubation:  BS equal bilateral and atraumatic/condition of teeth unchanged

## 2022-10-28 NOTE — OP NOTE
[facility]  Operative Note    Date: 10/28/2022    Pre-Op Diagnosis: Right hydrocele    Post-Op Diagnosis: same    Procedure(s) Performed:   1.  Right hydrocelectomy    Specimen(s):  Right hydrocele sac    Staff Surgeon: Anthony Cordero MD    Assistant Surgeon: Delfin Yan MD    Anesthesia: General LMA anesthesia    Indications: Paul Li Jr. is a 47 y.o. male with a right hydrocele.  He desires surgical correction.      Findings:   - 500 mL of fluid was drained  - excision and oversewing of hydrocele sac edges  - Good hemostasis at conclusion of the case  - No drain placed    Estimated Blood Loss: min    Drains: none    Procedure in detail:  After risks benefits and possible complications of hydrocelectomy were explained, the patient elected to undergo the procedure and consent was obtained. All questions were answered in the pre-operative area. The patient was transferred to the operative suite and placed in supine position on the operative table.  SCDs were applied and working.  Anesthesia was administered and the patient was prepped and draped in the usual sterile fashion. Time out was performed, esther-procedural antibiotics were confirmed.    The patient's hydrocele was palpated and brought to the anterior scrotal skin. A marking pen was used to carmina a 5 cm incision along the midline raphe.  A 15 blade was used to sharply incise the incision.  The underlying dartos and spermatic fascia was dissected with electrocautery until the hydrocele sac could be delivered through the scrotal incision. The tunica vaginalis was bluntly dissected free with a moist ray abbie and opened sharply with Metzenbaum scissors. Care was taken to preserve the spermatic cord and testicle. 500 mL of fluid was suctioned from the hydrocele sac.  It was then opened and excised with electrocautery leaving a 1 cm remnant circumferentially around the testis. The hydrocele sac was passed off the field for pathologic  analysis.  The circumferential remnant was then oversewn with a 3-0 chromic in a running baseball fashion keeping the epidididymis in tact.  The cord structures were inspected and found to be in tact.     The scrotum was irrigated with NS. Hemostasis was obtained with electrocautery. The testicle was returned to its orthotopic position. The dartos fascia was closed with a 3-0 chromic in a running fashion. The skin was re approximated with a 3-0 chromic suture in a horizontal matress fashion. Dermabond, fluffs and scrotal support was applied    The patient tolerated the procedure well and was transferred to the PACU in stable condition    Disposition:  The patient will follow up with Dr. Cordero in 4-6 weeks .  He was given prescriptions for oxycodone.  He was instructed to avoid heavy lifting x 2 weeks, ice his scrotum x 48 hours and wear scrotal support x 2 weeks.      Delfin Yan MD

## 2022-10-28 NOTE — PATIENT INSTRUCTIONS
Post Scrotal Surgery Instructions  No strenuous activity or exercise for 1 week  Take ibuprofen and tylenol for pain, opioid medications only as needed if uncontrolled by over the counter medications  Do not strain to have a bowel movement - take miralax as needed    You can expect:  Swelling in your scrotum, if there is significant swelling or pain this should be evaluated by an MD or ER  Swelling should resolve in the next few months    You can shower in 24 hours    You can place ice on your scrotum for 30 min to 1 hour at a time for swelling  You can also elevate your scrotum under towels to help with swelling when you are sitting    You can wear a jock strap or tight white underwear to help with swelling    Call the doctor if:  Temperature is greater than 101F  Persistent vomiting and inability to keep food down

## 2022-10-31 NOTE — ANESTHESIA POSTPROCEDURE EVALUATION
Anesthesia Post Evaluation    Patient: Paul Li Jr.    Procedure(s) Performed: Procedure(s) (LRB):  HYDROCELECTOMY (Right)    Final Anesthesia Type: general      Patient location during evaluation: [facility]  Patient participation: Yes- Able to Participate  Level of consciousness: awake and alert  Post-procedure vital signs: reviewed and stable  Pain management: adequate  Airway patency: patent    PONV status at discharge: No PONV  Anesthetic complications: no      Cardiovascular status: blood pressure returned to baseline  Respiratory status: unassisted  Hydration status: euvolemic  Follow-up not needed.          Vitals Value Taken Time   /70 10/28/22 1101   Temp 36.4 °C (97.5 °F) 10/28/22 0956   Pulse 67 10/28/22 1114   Resp 18 10/28/22 1114   SpO2 99 % 10/28/22 1035   Vitals shown include unvalidated device data.      No case tracking events are documented in the log.      Pain/Reji Score: No data recorded

## 2022-11-01 ENCOUNTER — PATIENT MESSAGE (OUTPATIENT)
Dept: PHARMACY | Facility: CLINIC | Age: 47
End: 2022-11-01
Payer: MEDICARE

## 2022-11-02 ENCOUNTER — CLINICAL SUPPORT (OUTPATIENT)
Dept: REHABILITATION | Facility: HOSPITAL | Age: 47
End: 2022-11-02
Payer: MEDICARE

## 2022-11-02 DIAGNOSIS — R29.3 POOR POSTURE: ICD-10-CM

## 2022-11-02 DIAGNOSIS — I89.0 LYMPHEDEMA OF RIGHT ARM: Primary | ICD-10-CM

## 2022-11-02 DIAGNOSIS — Z74.09 IMPAIRED FUNCTIONAL MOBILITY AND ACTIVITY TOLERANCE: ICD-10-CM

## 2022-11-02 PROCEDURE — 97140 MANUAL THERAPY 1/> REGIONS: CPT

## 2022-11-03 ENCOUNTER — INFUSION (OUTPATIENT)
Dept: INFUSION THERAPY | Facility: HOSPITAL | Age: 47
End: 2022-11-03
Attending: UROLOGY
Payer: MEDICARE

## 2022-11-03 ENCOUNTER — OFFICE VISIT (OUTPATIENT)
Dept: HEMATOLOGY/ONCOLOGY | Facility: CLINIC | Age: 47
End: 2022-11-03
Payer: MEDICARE

## 2022-11-03 VITALS
DIASTOLIC BLOOD PRESSURE: 64 MMHG | HEART RATE: 76 BPM | SYSTOLIC BLOOD PRESSURE: 126 MMHG | TEMPERATURE: 98 F | RESPIRATION RATE: 18 BRPM | SYSTOLIC BLOOD PRESSURE: 131 MMHG | WEIGHT: 315 LBS | HEART RATE: 69 BPM | OXYGEN SATURATION: 99 % | RESPIRATION RATE: 18 BRPM | DIASTOLIC BLOOD PRESSURE: 62 MMHG | HEIGHT: 74 IN | TEMPERATURE: 98 F | BODY MASS INDEX: 40.43 KG/M2

## 2022-11-03 DIAGNOSIS — I89.0 LYMPHEDEMA OF RIGHT UPPER EXTREMITY: ICD-10-CM

## 2022-11-03 DIAGNOSIS — C79.51 BONE METASTASES: ICD-10-CM

## 2022-11-03 DIAGNOSIS — I10 ESSENTIAL HYPERTENSION: ICD-10-CM

## 2022-11-03 DIAGNOSIS — D64.81 ANEMIA ASSOCIATED WITH CHEMOTHERAPY: ICD-10-CM

## 2022-11-03 DIAGNOSIS — Z17.1 MALIGNANT NEOPLASM INVOLVING BOTH NIPPLE AND AREOLA OF RIGHT BREAST IN MALE, ESTROGEN RECEPTOR NEGATIVE: Primary | ICD-10-CM

## 2022-11-03 DIAGNOSIS — T45.1X5A ANEMIA ASSOCIATED WITH CHEMOTHERAPY: ICD-10-CM

## 2022-11-03 DIAGNOSIS — E11.65 UNCONTROLLED TYPE 2 DIABETES MELLITUS WITH HYPERGLYCEMIA: ICD-10-CM

## 2022-11-03 DIAGNOSIS — C50.021 MALIGNANT NEOPLASM INVOLVING BOTH NIPPLE AND AREOLA OF RIGHT BREAST IN MALE, ESTROGEN RECEPTOR NEGATIVE: Primary | ICD-10-CM

## 2022-11-03 DIAGNOSIS — C79.51 BONE METASTASES: Primary | ICD-10-CM

## 2022-11-03 LAB
FINAL PATHOLOGIC DIAGNOSIS: NORMAL
Lab: NORMAL

## 2022-11-03 PROCEDURE — 99214 OFFICE O/P EST MOD 30 MIN: CPT | Mod: S$GLB,,, | Performed by: INTERNAL MEDICINE

## 2022-11-03 PROCEDURE — 99214 PR OFFICE/OUTPT VISIT, EST, LEVL IV, 30-39 MIN: ICD-10-PCS | Mod: S$GLB,,, | Performed by: INTERNAL MEDICINE

## 2022-11-03 PROCEDURE — 3074F SYST BP LT 130 MM HG: CPT | Mod: CPTII,S$GLB,, | Performed by: INTERNAL MEDICINE

## 2022-11-03 PROCEDURE — 1159F PR MEDICATION LIST DOCUMENTED IN MEDICAL RECORD: ICD-10-PCS | Mod: CPTII,S$GLB,, | Performed by: INTERNAL MEDICINE

## 2022-11-03 PROCEDURE — 1160F PR REVIEW ALL MEDS BY PRESCRIBER/CLIN PHARMACIST DOCUMENTED: ICD-10-PCS | Mod: CPTII,S$GLB,, | Performed by: INTERNAL MEDICINE

## 2022-11-03 PROCEDURE — 4010F PR ACE/ARB THEARPY RXD/TAKEN: ICD-10-PCS | Mod: CPTII,S$GLB,, | Performed by: INTERNAL MEDICINE

## 2022-11-03 PROCEDURE — 4010F ACE/ARB THERAPY RXD/TAKEN: CPT | Mod: CPTII,S$GLB,, | Performed by: INTERNAL MEDICINE

## 2022-11-03 PROCEDURE — 99999 PR PBB SHADOW E&M-EST. PATIENT-LVL V: ICD-10-PCS | Mod: PBBFAC,,, | Performed by: INTERNAL MEDICINE

## 2022-11-03 PROCEDURE — 3044F HG A1C LEVEL LT 7.0%: CPT | Mod: CPTII,S$GLB,, | Performed by: INTERNAL MEDICINE

## 2022-11-03 PROCEDURE — 25000003 PHARM REV CODE 250: Performed by: INTERNAL MEDICINE

## 2022-11-03 PROCEDURE — 63600175 PHARM REV CODE 636 W HCPCS: Performed by: INTERNAL MEDICINE

## 2022-11-03 PROCEDURE — 3008F PR BODY MASS INDEX (BMI) DOCUMENTED: ICD-10-PCS | Mod: CPTII,S$GLB,, | Performed by: INTERNAL MEDICINE

## 2022-11-03 PROCEDURE — 3044F PR MOST RECENT HEMOGLOBIN A1C LEVEL <7.0%: ICD-10-PCS | Mod: CPTII,S$GLB,, | Performed by: INTERNAL MEDICINE

## 2022-11-03 PROCEDURE — A4216 STERILE WATER/SALINE, 10 ML: HCPCS | Performed by: INTERNAL MEDICINE

## 2022-11-03 PROCEDURE — 3008F BODY MASS INDEX DOCD: CPT | Mod: CPTII,S$GLB,, | Performed by: INTERNAL MEDICINE

## 2022-11-03 PROCEDURE — 1160F RVW MEDS BY RX/DR IN RCRD: CPT | Mod: CPTII,S$GLB,, | Performed by: INTERNAL MEDICINE

## 2022-11-03 PROCEDURE — 1159F MED LIST DOCD IN RCRD: CPT | Mod: CPTII,S$GLB,, | Performed by: INTERNAL MEDICINE

## 2022-11-03 PROCEDURE — 99999 PR PBB SHADOW E&M-EST. PATIENT-LVL V: CPT | Mod: PBBFAC,,, | Performed by: INTERNAL MEDICINE

## 2022-11-03 PROCEDURE — 3074F PR MOST RECENT SYSTOLIC BLOOD PRESSURE < 130 MM HG: ICD-10-PCS | Mod: CPTII,S$GLB,, | Performed by: INTERNAL MEDICINE

## 2022-11-03 PROCEDURE — 3078F PR MOST RECENT DIASTOLIC BLOOD PRESSURE < 80 MM HG: ICD-10-PCS | Mod: CPTII,S$GLB,, | Performed by: INTERNAL MEDICINE

## 2022-11-03 PROCEDURE — 3078F DIAST BP <80 MM HG: CPT | Mod: CPTII,S$GLB,, | Performed by: INTERNAL MEDICINE

## 2022-11-03 PROCEDURE — 96365 THER/PROPH/DIAG IV INF INIT: CPT

## 2022-11-03 RX ORDER — SODIUM CHLORIDE 0.9 % (FLUSH) 0.9 %
10 SYRINGE (ML) INJECTION
Status: CANCELLED | OUTPATIENT
Start: 2022-11-22

## 2022-11-03 RX ORDER — HEPARIN 100 UNIT/ML
500 SYRINGE INTRAVENOUS
Status: DISCONTINUED | OUTPATIENT
Start: 2022-11-03 | End: 2022-11-03 | Stop reason: HOSPADM

## 2022-11-03 RX ORDER — HEPARIN 100 UNIT/ML
500 SYRINGE INTRAVENOUS
Status: CANCELLED | OUTPATIENT
Start: 2022-11-29

## 2022-11-03 RX ORDER — HEPARIN 100 UNIT/ML
500 SYRINGE INTRAVENOUS
Status: CANCELLED | OUTPATIENT
Start: 2022-11-17

## 2022-11-03 RX ORDER — SODIUM CHLORIDE 0.9 % (FLUSH) 0.9 %
10 SYRINGE (ML) INJECTION
Status: DISCONTINUED | OUTPATIENT
Start: 2022-11-03 | End: 2022-11-03 | Stop reason: HOSPADM

## 2022-11-03 RX ORDER — ZOLEDRONIC ACID 0.04 MG/ML
4 INJECTION, SOLUTION INTRAVENOUS
Status: CANCELLED | OUTPATIENT
Start: 2022-11-17

## 2022-11-03 RX ORDER — SODIUM CHLORIDE 0.9 % (FLUSH) 0.9 %
10 SYRINGE (ML) INJECTION
Status: CANCELLED | OUTPATIENT
Start: 2022-11-17

## 2022-11-03 RX ORDER — HEPARIN 100 UNIT/ML
500 SYRINGE INTRAVENOUS
Status: CANCELLED | OUTPATIENT
Start: 2022-12-06

## 2022-11-03 RX ORDER — SODIUM CHLORIDE 0.9 % (FLUSH) 0.9 %
10 SYRINGE (ML) INJECTION
Status: CANCELLED | OUTPATIENT
Start: 2022-11-29

## 2022-11-03 RX ORDER — SODIUM CHLORIDE 0.9 % (FLUSH) 0.9 %
10 SYRINGE (ML) INJECTION
Status: CANCELLED | OUTPATIENT
Start: 2022-12-06

## 2022-11-03 RX ORDER — SODIUM CHLORIDE 9 MG/ML
INJECTION, SOLUTION INTRAVENOUS CONTINUOUS
Status: CANCELLED | OUTPATIENT
Start: 2022-11-29

## 2022-11-03 RX ORDER — ZOLEDRONIC ACID 0.04 MG/ML
4 INJECTION, SOLUTION INTRAVENOUS
Status: COMPLETED | OUTPATIENT
Start: 2022-11-03 | End: 2022-11-03

## 2022-11-03 RX ORDER — SODIUM CHLORIDE 9 MG/ML
INJECTION, SOLUTION INTRAVENOUS CONTINUOUS
Status: CANCELLED | OUTPATIENT
Start: 2022-12-06

## 2022-11-03 RX ORDER — SODIUM CHLORIDE 9 MG/ML
INJECTION, SOLUTION INTRAVENOUS CONTINUOUS
Status: CANCELLED | OUTPATIENT
Start: 2022-11-22

## 2022-11-03 RX ORDER — HEPARIN 100 UNIT/ML
500 SYRINGE INTRAVENOUS
Status: CANCELLED | OUTPATIENT
Start: 2022-11-22

## 2022-11-03 RX ADMIN — ZOLEDRONIC ACID 4 MG: 0.04 INJECTION, SOLUTION INTRAVENOUS at 02:11

## 2022-11-03 RX ADMIN — SODIUM CHLORIDE: 0.9 INJECTION, SOLUTION INTRAVENOUS at 02:11

## 2022-11-03 RX ADMIN — Medication 10 ML: at 02:11

## 2022-11-03 RX ADMIN — HEPARIN 500 UNITS: 100 SYRINGE at 02:11

## 2022-11-03 NOTE — PLAN OF CARE
1437-Confirmed with Dr. Yvette mcwilliams only today, patient to have PET tomorrow and return next week for chemo. Patient tolerated treatment well. Discharged without complaints or S/S of adverse event.  Instructed to call provider for any questions or concerns.

## 2022-11-03 NOTE — PROGRESS NOTES
Subjective:       Patient ID: Paul Li Jr. is a 47 y.o. male.    Chief Complaint: Malignant neoplasm involving both nipple and areola of righ    HPI    Returns for follow up for chemotherapy C14D15 (Abraxane and PO Aplelisib)  Repaired right hydrocele on Friday 10/28/2022- happy to have this done and healing well  Completed outpatient group therapy in the interval and found it extremely helpful  Doing PT now for lymphedema  No pain issues other than mild in right groin  Eating well and weight stable  No fevers, chills or infectious complaints  Rare diarrhea  Has PET scan tomorrow     Most recent imaging-   - 6/6/2022 PET scan:  FINDINGS:  Quality of the study: Adequate.  In the head and neck, there is nonspecific focal uptake in the right infratemporal fossa without CT correlate which may reflect atypically focal muscular uptake.  There are no other hypermetabolic lesions worrisome for malignancy. There are no hypermetabolic mucosal lesions, and there are no pathologically enlarged or hypermetabolic lymph nodes.  In the chest, there are no hypermetabolic lesions worrisome for malignancy.  No pathologically enlarged or hypermetabolic lymph nodes.  Stable subcentimeter right pulmonary nodules too small to characterize on PET (images 84 and 85). Postsurgical changes of right mastectomy and axillary node dissection.  In the abdomen and pelvis, there is physiologic tracer distribution within the abdominal organs and excretion into the genitourinary system.  Diffuse sigmoid mild uptake, likely related to metformin use or physiologic activity.  There is sigmoid diverticulosis without CT evidence of diverticulitis.  In the bones, there are hypermetabolic lesions at the T12 and T7 vertebral bodies.  Uptake at the T12 vertebral body measures 5.8 SUV max corresponding to a region of prior sclerosis and T7 with an SUV of 4.4 within the body away from focal sclerosis on the right.  Other sclerotic lesions demonstrate uptake  similar to normal marrow activity.  In the extremities, there are no hypermetabolic lesions worrisome for malignancy.  Additional CT findings: Bilateral inguinal hernias.  Large left hydrocele.  Impression:  Interval increased uptake in the T7 body away from sclerosis and T12 body involving a region of prior sclerosis.  Differential considerations for T7 include focal nodular hyperplasia of marrow versus early, CT occult metastasis, and differential considerations for T12 include active osteoblastic metastasis versus healing metastasis.  Other sclerotic lesions demonstrate uptake similar to normal marrow suggesting response to therapy.  Nonspecific focal uptake at the right temporal fossa without CT correlate may indicate atypically focal muscular uptake.  Attention on follow-up     Diagnosis:  Metastatic TNBC progressed (prior Stage IB ([id number]) triple negative invasive ductal carcinoma with lobular features of right breast, retroareolar, Grade 2, Ki67 80%, diagnosed 2019)     Oncology History:  Metastatic  Set up for scans (due to back pain) which are consistent for recurrence.   Imagin/3/2021 MRI T/L spine:  FINDINGS:  Alignment: Within normal limits.  Vertebrae: Low T1 signal intensity in the left T12 vertebral body extending to the left pedicle, S1 and S2 vertebral bodies with abnormal enhancement.  The vertebral heights are well maintained.  No evidence of acute fractures.  Abnormal appearance of the LEFT sacrum and ilium as well.  Discs: Degenerative disease of the level of L4-L5 with posterior annular fissure.  Conus appears within normal limits terminating at the level of the L2. level.  Cauda equina appears unremarkable.  Mild circumferential disc bulging at the level of T10-T11 abutting the ventral thecal sac.  No significant spinal canal stenosis or neural foraminal narrowing throughout the thoracic spine.  No significant spinal canal stenosis or neural foraminal narrowing throughout the lumbar  spine.  Paraspinous muscles and soft tissues: Subcentimeter focal enhancement in the posterior column along the supraspinous ligament at the level of L1-L2 (sagittal series 21, image 10) potentially inflammatory versus neoplastic.  Impression:  Abnormal appearance of the T12, S1, S2 vertebral bodies as well as LEFT sacrum and ilium concerning for metastatic disease in this patient with history of breast cancer.  No evidence for significant central canal narrowing.  Additional findings, as above.  This report was flagged in Epic as abnormal.     6/9/2021 PET scan:  COMPARISON:  MRI thoracic and lumbar spine 06/03/2021  FINDINGS:  Quality of the study: Adequate.  In the head and neck, there are no hypermetabolic lesions worrisome for malignancy. There are no hypermetabolic mucosal lesions, and there are no pathologically enlarged or hypermetabolic lymph nodes.  In the chest, there is postoperative change of right mastectomy/right axillary lymph node dissection.  There is low level hypermetabolic activity with mild soft tissue thickening in the skin/superficial subcutaneous fat of the right chest wall (axial fused image 78) with SUV max 3.6.  There is soft tissue thickening measuring approximately 1.8 x 7.9 cm in the subcutaneous fat of the right chest wall (axial series 3, image 84) with SUV max 3.1.  There are a few bilateral pulmonary nodules measuring up to 0.3 cm in the left lung (axial series 3, image 88) and 0.5 cm in the right lung (axial series 3, image 84).  These nodules are too small to characterize by PET.  There are no pathologically enlarged or hypermetabolic lymph nodes.  In the abdomen and pelvis, there is physiologic tracer distribution within the abdominal organs and excretion into the genitourinary system.  Subtle sen mesentery and multiple prominent mesenteric lymph nodes measuring up to 1.8 cm in long axis with background activity.  In the bones, there are several hypermetabolic lesions  worrisome for malignancy.  Sclerotic lesion in the left T12 vertebral body (axial series 3, image 131) with SUV max 12.  Sclerotic lesions in the sacrum (for example axial series 3, image 188) with SUV max 9.7.  Sclerotic lesions in the left iliac bone (for example axial series 3, image 205) with SUV max 6.  In the extremities, there are no hypermetabolic lesions worrisome for malignancy.  Incidental findings:  Bilateral maxillary antra mucous retention cysts.  Fat containing right inguinal hernia.  Impression:  1. In this patient with right breast carcinoma status post mastectomy/right axillary lymph node dissection, there are multiple sclerotic lesions in the T12 vertebral body, sacrum, and left iliac bone with hypermetabolic activity concerning for active metastatic disease and in keeping with findings on MRI 06/03/2021.  2. Additionally there is low-level hypermetabolic activity with soft tissue thickening in the right chest wall as detailed above.  These findings are nonspecific and may be related to postoperative/post radiation change, with recurrent malignancy not excluded.  3. Nonspecific mesenteric haziness and lymph nodes which may indicate mesenteric adenitis.  Recommend clinical correlation and attention on follow-up.  4. Additional findings as above.  I, Sohan Tillman MD, attest that I reviewed and interpreted the images.     In summary,   Started aplelisib 8/22/2021   Abraxane C1 started 8/13/2021  We had sent Guardant and discussed results with Molecular tumor board  He also had a repeat bx to confirm metastatic triple negative breast cancer  Plan:   Nab-Paclitaxel and Alpelisib  Reference: https://ascopubs.org/doi/abs/10.1200/JCO.[email or web address]_suppl.1018  Also on Zometa-      - C1D1 Abraxane 8/13/2021  - Started aplelisib 8/22/2021               Oncology History   Malignant neoplasm involving both nipple and areola of right breast in male, estrogen receptor negative   5/1/2019 Imaging Significant Findings      Mammogram and US  Impression:  Right  Mass: Right breast 14 mm mass at the retroareolar anterior position. Assessment: 4 - Suspicious finding. Biopsy is recommended.   Left  There is no mammographic or sonographic evidence of malignancy.  Recommendation:  Biopsy is recommended.      5/2/2019 Biopsy     BREAST, RIGHT, RETROAREOLAR MASS, ULTRASOUND-GUIDED BIOPSY:  Invasive ductal carcinoma with lobular features.  Size of invasive carcinoma: 5 MM in greatest linear dimension within a single      5/2/2019 Breast Tumor Markers     Estrogen: Negative  Progesterone: Negative  HER2: Negative  Ki67: 80      5/17/2019 Cancer Staged     Staging form: Breast, AJCC 8th Edition  - Clinical stage from 5/17/2019: Stage IB (cT1c, cN0, cM0, G2, ER-, IL-, HER2-) - Signed by Richa Bahena MD on 5/17/2019 5/27/2019 - 7/21/2019 Chemotherapy     Treatment Summary   Plan Name: OP BREAST DOSE-DENSE AC - DOXORUBICIN CYCLOPHOSPHAMIDE Q2W  Treatment Goal: Curative  Status: Inactive  Start Date: 5/27/2019  End Date: 7/9/2019  Provider: Richa Bahena MD  Chemotherapy: DOXOrubicin chemo injection 186 mg, 60 mg/m2 = 186 mg, Intravenous, Clinic/HOD 1 time, 4 of 4 cycles  Administration: 186 mg (5/27/2019), 186 mg (6/10/2019), 186 mg (6/24/2019), 186 mg (7/8/2019)  cyclophosphamide (CYTOXAN) 600 mg/m2 = 1,865 mg in sodium chloride 0.9% 250 mL chemo infusion, 600 mg/m2 = 1,865 mg, Intravenous, Clinic/HOD 1 time, 4 of 4 cycles  Administration: 1,865 mg (5/27/2019), 1,865 mg (6/10/2019), 1,865 mg (6/24/2019), 1,865 mg (7/8/2019)      7/22/2019 - 10/15/2019 Chemotherapy     Treatment Summary   Plan Name: OP PACLITAXEL QW  Treatment Goal: Curative  Status: Inactive  Start Date: 7/24/2019  End Date: 10/8/2019  Provider: Richa Bahena MD  Chemotherapy: PACLitaxel (TAXOL) 80 mg/m2 = 246 mg in sodium chloride 0.9% 250 mL chemo infusion, 80 mg/m2 = 246 mg, Intravenous, Clinic/HOD 1 time, 12 of 12 cycles  Dose modification: 60 mg/m2  (original dose 80 mg/m2, Cycle 3), 55 mg/m2 (original dose 80 mg/m2, Cycle 7), 60 mg/m2 (original dose 80 mg/m2, Cycle 7), 50 mg/m2 (original dose 80 mg/m2, Cycle 9), 50 mg/m2 (original dose 80 mg/m2, Cycle 10)  Administration: 246 mg (7/24/2019), 246 mg (7/31/2019), 186 mg (8/7/2019), 186 mg (8/14/2019), 186 mg (8/21/2019), 186 mg (8/28/2019), 186 mg (9/4/2019), 174 mg (9/11/2019), 156 mg (9/18/2019), 156 mg (9/25/2019), 156 mg (10/1/2019), 156 mg (10/8/2019)      11/22/2019 Breast Surgery     On 11/22/19 Dr whyte performed a right breast mastectomy with SLN biopsy with pathology showing grade 2, 6 mm IDC with extensive treatment effect, no LVI with 1 LN positive 4 mm, negative margins. The on 12/17/19, Dr Whyte performed right axillary dissection with pathology still pending.      11/22/2019 Cancer Staged     ypT1b N1      12/17/2019 Breast Surgery     Surgery: Dr Shima Whyte, [phone] performed right ALND with pathology showing 14 negative lymph nodes.             1/14/2020 Tumor Conference      Post mastectomy radiation therapy: Xeloda, then possible Keytruda trial .      2/3/2020 - 3/23/2020 Radiation Therapy     Treating physician: Speedy Nair MD   Total Dose: 50.4 Gy to the chest wall and regional lymphatics  10 Gy boost to the prostate.   Fractions: 28 fractions at 1.8 Gy per fraction  5 fractions at 2 Gy per fraction       2/28/2020 -  Chemotherapy     * 4/15/2020 - 9/28/20 completed 6 cycles adjuvant Xeloda 1000 mg/m2 bid days 1-14 every 21 days  Treatment Summary   Plan Name: OP CAPECITABINE 1250MG/M2 BID Q3W  Treatment Goal: Curative  Status: Active  Start Date: 3/20/2020  End Date: 3/20/2020  Provider: Richa Bahena MD  Chemotherapy: [No matching medication found in this treatment plan]                Review of Systems   Constitutional:  Negative for activity change, appetite change, chills, fatigue, fever and unexpected weight change.   HENT:  Negative for dental problem, postnasal  drip, rhinorrhea, sinus pressure/congestion, sore throat, trouble swallowing and voice change.    Eyes:  Negative for visual disturbance.   Respiratory:  Negative for cough, shortness of breath and wheezing.    Cardiovascular:  Negative for chest pain, palpitations and leg swelling.   Gastrointestinal:  Negative for abdominal distention, abdominal pain, blood in stool, change in bowel habit, constipation, diarrhea, nausea, vomiting and change in bowel habit.   Endocrine: Negative for cold intolerance and heat intolerance.   Genitourinary:  Negative for decreased urine volume, difficulty urinating, dysuria, frequency, hematuria and urgency.   Musculoskeletal:  Negative for arthralgias, back pain, gait problem, joint swelling, myalgias, neck pain and neck stiffness.        Lymphedema right arm   Integumentary:  Negative for color change, pallor, rash and wound.        Right arm lymphedema    Neurological:  Positive for numbness (improved neuropathy; right hand fingers). Negative for dizziness, weakness, light-headedness and headaches.   Hematological:  Negative for adenopathy. Does not bruise/bleed easily.   Psychiatric/Behavioral:  Negative for dysphoric mood and sleep disturbance. The patient is not nervous/anxious.        Objective:      Physical Exam  Vitals and nursing note reviewed.   Constitutional:       General: He is not in acute distress.     Appearance: Normal appearance. He is well-developed. He is obese. He is not diaphoretic.      Comments: Presents alone  Very pleasant  ECOG= 0   HENT:      Head: Normocephalic and atraumatic.   Eyes:      General: No scleral icterus.     Extraocular Movements: Extraocular movements intact.      Conjunctiva/sclera: Conjunctivae normal.      Pupils: Pupils are equal, round, and reactive to light.   Neck:      Thyroid: No thyromegaly.      Trachea: No tracheal deviation.   Cardiovascular:      Rate and Rhythm: Normal rate and regular rhythm.      Heart sounds: Normal  heart sounds. No murmur heard.    No friction rub. No gallop.   Pulmonary:      Effort: Pulmonary effort is normal. No respiratory distress.      Breath sounds: Normal breath sounds. No wheezing, rhonchi or rales.   Chest:      Chest wall: No tenderness.   Abdominal:      General: Bowel sounds are normal. There is no distension.      Palpations: Abdomen is soft. There is no mass.      Tenderness: There is no abdominal tenderness. There is no guarding or rebound.      Comments: No organomegaly   Musculoskeletal:         General: Swelling (chronic RUE lymphedema) present. No tenderness or deformity. Normal range of motion.      Cervical back: Normal range of motion and neck supple. No rigidity or tenderness.      Right lower leg: No edema.      Left lower leg: No edema.   Lymphadenopathy:      Cervical: No cervical adenopathy.   Skin:     General: Skin is warm and dry.      Coloration: Skin is not jaundiced or pale.      Findings: No erythema or rash.   Neurological:      General: No focal deficit present.      Mental Status: He is alert and oriented to person, place, and time. Mental status is at baseline.      Cranial Nerves: No cranial nerve deficit.      Sensory: No sensory deficit.      Motor: No weakness or abnormal muscle tone.      Coordination: Coordination normal.      Gait: Gait normal.      Deep Tendon Reflexes: Reflexes are normal and symmetric. Reflexes normal.   Psychiatric:         Mood and Affect: Mood normal.         Behavior: Behavior normal.         Thought Content: Thought content normal.         Judgment: Judgment normal.       Labs- reviewed  Assessment:       Problem List Items Addressed This Visit       Uncontrolled type 2 diabetes mellitus with hyperglycemia    Malignant neoplasm involving both nipple and areola of right breast in male, estrogen receptor negative - Primary    Lymphedema of right upper extremity    Essential hypertension    Bone metastases    Anemia associated with  chemotherapy       Plan:     Type 2 DM, HTN- better control    Lymphedema- doing PT    Metastatic breast cancer- Tolerating therapy well  PET scan tomorrow  Continue Zometa  May be moving to [place]    Route Chart for Scheduling    Med Onc Chart Routing  Urgent    Follow up with physician    Follow up with JAC . Needs EP on 11/17 with JUSTYNA or Michelle   Infusion scheduling note    Injection scheduling note    Labs    Imaging    Pharmacy appointment    Other referrals        Treatment Plan Information   OP BREAST NAB-PACLITAXEL D1, D8, D15 + ALPELISIB    Rosa Linn MD   Upcoming Treatment Dates - OP BREAST NAB-PACLITAXEL D1, D8, D15 + ALPELISIB     11/3/2022       Chemotherapy       PACLitaxeL protein-bound (ABRAXANE) 80 mg/m2 = 220 mg in 44 mL infusion  11/13/2022       Chemotherapy       PACLitaxeL protein-bound (ABRAXANE) 80 mg/m2 = 220 mg in 44 mL infusion  11/20/2022       Chemotherapy       PACLitaxeL protein-bound (ABRAXANE) 80 mg/m2 = 220 mg in 44 mL infusion    Supportive Plan Information  OP FILGRASTIM 480 MCG   Michelle Grayson, LAUREEN   Upcoming Treatment Dates - OP FILGRASTIM 480 MCG    No upcoming days in selected categories.    Therapy Plan Information  PORT FLUSH  Flushes  heparin, porcine (PF) 100 unit/mL injection flush 500 Units  500 Units, Intravenous, Every visit  sodium chloride 0.9% flush 10 mL  10 mL, Intravenous, Every visit    ZOLEDRONIC ACID (ZOMETA) IV  Medications  zoledronic 4 mg/100 mL infusion 4 mg  4 mg, Intravenous, Every 4 weeks  Flushes  sodium chloride 0.9% 250 mL flush bag  Intravenous, Every 4 weeks  sodium chloride 0.9% flush 10 mL  10 mL, Intravenous, Every 4 weeks  heparin, porcine (PF) 100 unit/mL injection flush 500 Units  500 Units, Intravenous, Every 4 weeks  alteplase injection 2 mg  2 mg, Intra-Catheter, Every 4 weeks

## 2022-11-03 NOTE — PLAN OF CARE
1414-Labs , hx, and medications reviewed, patient was seen by MD prior to arrival, denies any jaw pain/recent dental work and confirms he takes a calcium supplement at home. Assessment completed. Discussed plan of care with patient. Patient in agreement. Chair reclined and warm blanket and snack offered.

## 2022-11-04 ENCOUNTER — SPECIALTY PHARMACY (OUTPATIENT)
Dept: PHARMACY | Facility: CLINIC | Age: 47
End: 2022-11-04
Payer: MEDICARE

## 2022-11-04 ENCOUNTER — HOSPITAL ENCOUNTER (OUTPATIENT)
Dept: RADIOLOGY | Facility: HOSPITAL | Age: 47
Discharge: HOME | End: 2022-11-04
Attending: NURSE PRACTITIONER
Payer: MEDICARE

## 2022-11-04 ENCOUNTER — CLINICAL SUPPORT (OUTPATIENT)
Dept: REHABILITATION | Facility: HOSPITAL | Age: 47
End: 2022-11-04
Payer: MEDICARE

## 2022-11-04 DIAGNOSIS — Z17.1 MALIGNANT NEOPLASM INVOLVING BOTH NIPPLE AND AREOLA OF RIGHT BREAST IN MALE, ESTROGEN RECEPTOR NEGATIVE: ICD-10-CM

## 2022-11-04 DIAGNOSIS — I89.0 LYMPHEDEMA OF RIGHT ARM: Primary | ICD-10-CM

## 2022-11-04 DIAGNOSIS — C79.51 BONE METASTASES: ICD-10-CM

## 2022-11-04 DIAGNOSIS — Z74.09 IMPAIRED FUNCTIONAL MOBILITY AND ACTIVITY TOLERANCE: ICD-10-CM

## 2022-11-04 DIAGNOSIS — R29.3 POOR POSTURE: ICD-10-CM

## 2022-11-04 DIAGNOSIS — C50.021 MALIGNANT NEOPLASM INVOLVING BOTH NIPPLE AND AREOLA OF RIGHT BREAST IN MALE, ESTROGEN RECEPTOR NEGATIVE: ICD-10-CM

## 2022-11-04 LAB — POCT GLUCOSE: 78 MG/DL (ref 70–110)

## 2022-11-04 PROCEDURE — 25500020 PHARM REV CODE 255: Performed by: NURSE PRACTITIONER

## 2022-11-04 PROCEDURE — 78815 NM PET CT ROUTINE: ICD-10-PCS | Mod: 26,PS,, | Performed by: RADIOLOGY

## 2022-11-04 PROCEDURE — 78815 PET IMAGE W/CT SKULL-THIGH: CPT | Mod: 26,PS,, | Performed by: RADIOLOGY

## 2022-11-04 PROCEDURE — 78815 PET IMAGE W/CT SKULL-THIGH: CPT | Mod: TC,PS

## 2022-11-04 PROCEDURE — A9698 NON-RAD CONTRAST MATERIALNOC: HCPCS | Performed by: NURSE PRACTITIONER

## 2022-11-04 PROCEDURE — 97140 MANUAL THERAPY 1/> REGIONS: CPT

## 2022-11-04 RX ADMIN — BARIUM SULFATE 450 ML: 20 SUSPENSION ORAL at 12:11

## 2022-11-04 NOTE — PROGRESS NOTES
Physical Therapy Daily Treatment Note       Date: 11/4/2022   Name: Paul Li Jr.  Clinic Number: [id number]    Therapy Diagnosis:   Encounter Diagnoses   Name Primary?    Lymphedema of right arm Yes    Impaired functional mobility and activity tolerance     Poor posture      Physician: Rosa Linn MD    Physician Orders: PT Eval and Treat -RUE lymphedema  Medical Diagnosis: Malignant neoplasm involving both nipple and areola of right breast in male, estrogen receptor negative [C50.021, Z17.1], Lymphedema of right upper extremity   Evaluation Date: 10/10/2022  Authorization Period Expiration: 10/14/22  Plan of Care Certification Period: 1/6/23 (12 weeks)  Visit # / Visits authorized: 5/ 11 (plus evaluation)  Insurance: [employer]  FOTO: 2/3     Time In: 10:05 AM  Time Out: 11:01 AM  Total Billable Time: 56 minutes     Precautions: Standard, Fall, cancer, and Spine, Bony Mets, hx of Seizures, L chest wall port      Subjective     Pt reports:No new complaints. He has kept bandages on.   He was compliant with self manual lymph drainage  Response to previous treatment: his Right hand is smaller  Pain Scale: Paul rates pain on a scale of 0/10 on VAS.   Pain location: pelvis is sore    Fatigue: tired from full day at [facility]  Functional change: none stated  Treatment:   Chemotherapy: te-adjuvant chemo therapy completed 10/19  Pt is currently on chemotherapy: pt is getting 1 infusion/week for 3 weeks with 1 week break, and he takes oral chemotherapy daily.  Radiation: Completed in February 2020  Surgery date: 12/17/19 pt underwent right axillary lymph node dissection and resection excess lateral tissue; 11/22/19 right simple mastectomy with SLNB; total of 18 lymph nodes removed      Objective     Objective Measures updated at progress report unless specified.     LANDMARK RIGHT UE LEFT UE DIFF RUE Diff RUE Diff RUE Diff   Date Eval Eval Eval  "10/17/22 10/17/22 10/29/22 10/19/22 11/2/22 11/2/22   E + 8" 50 cm 46.5 cm 3.5 cm 48 cm -2 47.5 cm -0.5 50.5 cm +3   E + 6" 45.5 cm 44 cm 1.5 cm 45 cm -0.5 46.5 cm +1.5 47 cm +0.5   E + 4" 40 cm 40.5 cm 0.5 cm 40.5 cm +0.5 40.5cm No chg 41 cm +0.5   E + 2" 38.5 cm 38.5 cm 0 cm 39 cm +0.5 40 cm +1 39.5 cm -0.5   Elbow 36.5 cm 34 cm 2.5 cm 35.5 cm -1 35.5 cm No chg 36 cm +0.5   W+ 8" 37.5cm 33 cm 4.5 cm 38cm +0.5 36.5 cm -1.5 37.5 cm +1   W +  6" 35 cm 29.5 cm 5.5 cm 34 cm -1 34.5 cm +0.5 33.5 cm -1   W + 4" 31.5 cm 25 cm 6.5 cm 31.5 cm No chg 31.5 cm No chg 29.5 cm -2   Wrist 23 cm 20 cm 3 cm 22.5cm -0.5 21.5 cm -1 23 cm +1.5   DPC 28.5 cm 26 cm 2.5 cm 28 cm -0.5 27.5cm -0.5 28.5 cm +1   IP Thumb 8.5 cm 8 cm 0.5 cm 8.5 cm No chg 8.5 cm No chg 9 cm +0.5        Functional Limitations Reporting       CMS Impairment/Limitation/Restriction for FOTO Upper quadrant edema Survey     Therapist reviewed FOTO scores for Paul Li Jr. on 10/4/2022.   FOTO documents entered into EPIC - see Media section.     Limitations Score: 2%  Category: Carrying              Treatment     Paul received individual therapeutic exercises to improve postural correction and alignment, stretching and soft tissue mobility, and strengthening for 5 minutes including the following:     Diaphragmatic breathing, 2 x 5 reps  Scapular retractions, 10 reps  Shoulder rolls, 10 reps each fwd and back.    Paul received the following manual therapy techniques were performed to increased myofascial/soft tissue length, mobility and pliability, increase PROM, AROM and function as well as to decrease pain for 51 minutes      Short Neck- held due to history of seizures  Clear Healthy Lymph Nodes:  Left Axilla                                                  Right Groin  Open Anastomoses:  Anterior Axillo-Axillary  (AAA)                                    Axillo-Inguinal     (AI)                                    Posterior Axillo-Axillary  (GEREMIAS)      Right Upper " Arm (Lateral and Medial)  Right  Antecubital Fossa  Right Dorsal Forearm  Right Volar Forearm  Dorsum Right Hand  Right fingers     Rework Right UE  Rework Anastomoses  Rework Healthy lymph Nodes    PT applies  gauze to right fingers and hand and stockinette, cotton artiflex, and short stretch bandages to pt's RIGHT/LEFT/BILATERAL: right upper extremity. PT added size H tubigrip over bandaging to assist keeping it in place. Pt educated to wear bandaging for next 2-3 days unless it causes pain, numbness, or changes in skin color/temperature, or if they start to fall down.  If removed, pt instructed to roll up bandages and cotton padding and bring to next session. If bandages are removed, pt can wear his tubigrip.  Pt has Vet glove to cover bandages in the shower and directions on how to care for bandages. Pt verbalizes understanding of all topics.            Home Exercise Program and Patient Education   Education provided re:  - role of PT in multi - disciplinary team, goals for PT  - progress towards goals         Written Home Exercises Provided: Patient instructed to cont prior HEP.  Exercises were reviewed and Paul was able to demonstrate them prior to the end of the session.  Paul demonstrated good  understanding of the education provided.     See EMR under Media for self lymphatic drainage provided prior visit.    Pt has no cultural, educational or language barriers to learning provided.    Assessment     Patient is responding well to physical therapy. Pt has been cleared by referring physician to receive complete decongestive therapy. He has been compliant with self manual lymph drainage and wearing compression bandages/tubigrip. His right arm appears smaller today. He reports substantial improvement in functional limitations score based on FOTO responses. Pt received complete decongestive therapy today without issue in clinic. Will gauge response at next session and progress as tolerated.      Pt prognosis is  Good. Pt will continue to benefit from skilled outpatient physical therapy to address the deficits listed in the problem list chart on initial evaluation, provide pt/family education and to maximize pt's level of independence in the home and community environment.     Anticipated barriers to physical therapy: advanced disease    Goals as follows:  Short Term Goals (6 weeks)  1. Pt will demo decrease in girth in right upper extremity by >/= 2cm to allow for improved UE symmetry, clothing choice, ROM, and UE function. (progressing, not met)  2. Patient will demonstrate 100% knowledge of lymphedema precautions and signs of infection to allow for reduced risk of lymphedema, reduced risk of infection, and/or exacerbation.  (progressing, not met)  3. Patient will perform self lymph drainage techniques to enhance lymphatic drainage and mobility.  (progressing, not met)  4. Patient will tolerate daily activities with multilayered bandaging to enhance lymphatic drainage and skin elasticity.  (progressing, not met)  5. Pt will tolerate HEP for improved strength, functional mobility, ROM, posture, and endurance. (progressing, not met)        Long Term Goals: ( 12  weeks)  1. Patient to show decreased girth in right upper extremity by >/= 3cm to allow for improved UE symmetry, clothing choice, ROM, and UE function.  (progressing, not met)  2. Patient will show reduction in density to mild or less with improved contour of limb to allow for improved cosmesis, improved lymphatic drainage, and functional mobility.  (progressing, not met)  3. Patient to bruna/doff compression garment with daily compliance to support lymphatic mobility and skin elasticity.  (progressing, not met)  4. Pt to show improved postural awareness and alignment for improved functional mobility.  (progressing, not met)  5. Pt to be independent and compliant with HEP to allow for improved ROM, reach and carry with functional endurance and strength.     (progressing, not met)           Plan   Outpatient physical therapy 2x week for 12 weeks to include the following:   Manual Therapy, Neuromuscular Re-ed, Orthotic Management and Training, Patient Education, Self Care, Therapeutic Activities, and Therapeutic Exercise.     Plan of care Certification Period: 10/10/2022 to 1/6/23.       Therapist: Katelynn Harrell, PT  11/4/2022

## 2022-11-04 NOTE — TELEPHONE ENCOUNTER
Specialty Pharmacy - Refill Coordination    Specialty Medication Orders Linked to Encounter      Flowsheet Row Most Recent Value   Medication #1 alpelisib 300 mg/day (150 mg x 2) Tab (Order#[id number], Rx#[id number])            Refill Questions - Documented Responses      Flowsheet Row Most Recent Value   Patient Availability and HIPAA Verification    Does patient want to proceed with activity? Yes   HIPAA/medical authority confirmed? Yes   Relationship to patient of person spoken to? Self   Refill Screening Questions    Changes to allergies? No   Changes to medications? No   New conditions since last clinic visit? No   Unplanned office visit, urgent care, ED, or hospital admission in the last 4 weeks? No   How does patient/caregiver feel medication is working? Good   Financial problems or insurance changes? No   How many doses of your specialty medications were missed in the last 4 weeks? 0   Would patient like to speak to a pharmacist? No   When does the patient need to receive the medication? 11/11/22   Refill Delivery Questions    How will the patient receive the medication? Pickup   When does the patient need to receive the medication? 11/11/22   Shipping Address Home   Address in [facility] confirmed and updated if neccessary? Yes   Expected Copay ($) 0   Is the patient able to afford the medication copay? Yes   Payment Method zero copay   Days supply of Refill 28   Supplies needed? No supplies needed   Refill activity completed? Yes   Refill activity plan Refill scheduled   Shipment/Pickup Date: 11/08/22            Current Outpatient Medications   Medication Sig    alpelisib 300 mg/day (150 mg x 2) Tab Take 2 tablets (300 mg) by mouth once daily.    ALPRAZolam (XANAX) 0.5 MG tablet Take 1 tablet (0.5 mg total) by mouth 3 (three) times daily as needed for Insomnia or Anxiety.    amLODIPine (NORVASC) 10 MG tablet TAKE 1 TABLET (10 MG) BY MOUTH EVERY DAY    calcium-vitamin D3 (OYSTER SHELL CALCIUM-VIT D3)  "500 mg-5 mcg (200 unit) per tablet Take 1 tablet by mouth 2 (two) times daily.    diphenoxylate-atropine 2.5-0.025 mg (LOMOTIL) 2.5-0.025 mg per tablet Take 1 tablet by mouth 4 (four) times daily as needed for Diarrhea.    dulaglutide (TRULICITY) 1.5 mg/0.5 mL pen injector Inject 1.5 mg into the skin once a week.    FLUoxetine 20 MG capsule Take 2 capsules (40 mg total) by mouth once daily.    FLUoxetine 40 MG capsule Take 2 capsules (80 mg total) by mouth once daily.    gabapentin (NEURONTIN) 300 MG capsule Take 1 capsule (300 mg total) by mouth 3 (three) times daily.    hydroCHLOROthiazide (HYDRODIURIL) 25 MG tablet TAKE 1 TABLET BY MOUTH ONCE DAILY    HYDROcodone-acetaminophen (NORCO)  mg per tablet Take 1 tablet by mouth every 6 (six) hours as needed for Pain.    insulin detemir U-100 (LEVEMIR FLEXTOUCH U-100 INSULN) 100 unit/mL (3 mL) InPn pen Inject 21 Units into the skin every evening.    insulin lispro (HUMALOG KWIKPEN INSULIN) 100 unit/mL pen Inject 5 Units into the skin 3 (three) times daily.    lancets 33 gauge Misc 1 lancet by Misc.(Non-Drug; Combo Route) route once daily.    lancing device Misc 1 Device by Misc.(Non-Drug; Combo Route) route 2 (two) times daily with meals.    LIDOcaine-prilocaine (EMLA) cream Apply topically as needed.    losartan (COZAAR) 50 MG tablet Take 2 tablets by mouth once daily    metFORMIN (GLUCOPHAGE) 500 MG tablet Take 2 tablets (1,000 mg total) by mouth 2 (two) times daily with meals. Needs appointment for future refills    oxyCODONE (ROXICODONE) 5 MG immediate release tablet Take 1 tablet (5 mg total) by mouth every 4 (four) hours as needed for Pain.    pen needle, diabetic (BD ULTRA-FINE TANESHA PEN NEEDLE) 32 gauge x 5/32" Ndle Use as directed with novolog and levemir    tadalafiL (CIALIS) 5 MG tablet Take 2 tablets (10 mg total) by mouth daily as needed for Erectile Dysfunction.    traZODone (DESYREL) 50 MG tablet Take 1 tablet (50 mg total) by mouth every evening. "   Last reviewed on 11/3/2022  1:14 PM by Rosa Linn MD    Review of patient's allergies indicates:  No Known Allergies Last reviewed on  11/3/2022 1:54 PM by Joslyn Lane      Tasks added this encounter   12/2/2022 - Refill Call (Auto Added)  11/9/2022 - Pickup Reminder   Tasks due within next 3 months   No tasks due.     [facility]  [address] [place] [id number]  Phone: [phone]  Fax: [phone]

## 2022-11-06 DIAGNOSIS — G89.3 NEOPLASM RELATED PAIN: ICD-10-CM

## 2022-11-06 RX ORDER — HYDROCODONE BITARTRATE AND ACETAMINOPHEN 10; 325 MG/1; MG/1
1 TABLET ORAL EVERY 6 HOURS PRN
Qty: 90 TABLET | Refills: 0 | Status: CANCELLED | OUTPATIENT
Start: 2022-11-06

## 2022-11-07 ENCOUNTER — TELEPHONE (OUTPATIENT)
Dept: HEMATOLOGY/ONCOLOGY | Facility: CLINIC | Age: 47
End: 2022-11-07
Payer: MEDICARE

## 2022-11-07 ENCOUNTER — PATIENT MESSAGE (OUTPATIENT)
Dept: HEMATOLOGY/ONCOLOGY | Facility: CLINIC | Age: 47
End: 2022-11-07
Payer: MEDICARE

## 2022-11-07 ENCOUNTER — HOSPITAL ENCOUNTER (EMERGENCY)
Facility: HOSPITAL | Age: 47
Discharge: HOME | End: 2022-11-07
Attending: STUDENT IN AN ORGANIZED HEALTH CARE EDUCATION/TRAINING PROGRAM
Payer: MEDICARE

## 2022-11-07 VITALS
BODY MASS INDEX: 40.43 KG/M2 | SYSTOLIC BLOOD PRESSURE: 111 MMHG | HEART RATE: 63 BPM | WEIGHT: 315 LBS | RESPIRATION RATE: 14 BRPM | HEIGHT: 74 IN | TEMPERATURE: 98 F | OXYGEN SATURATION: 98 % | DIASTOLIC BLOOD PRESSURE: 55 MMHG

## 2022-11-07 DIAGNOSIS — G89.3 NEOPLASM RELATED PAIN: ICD-10-CM

## 2022-11-07 DIAGNOSIS — M54.50 ACUTE LEFT-SIDED LOW BACK PAIN WITHOUT SCIATICA: Primary | ICD-10-CM

## 2022-11-07 DIAGNOSIS — S33.5XXA LUMBAR SPRAIN, INITIAL ENCOUNTER: ICD-10-CM

## 2022-11-07 LAB
ALBUMIN SERPL BCP-MCNC: 3.4 G/DL (ref 3.5–5.2)
ALP SERPL-CCNC: 104 U/L (ref 55–135)
ALT SERPL W/O P-5'-P-CCNC: 11 U/L (ref 10–44)
ANION GAP SERPL CALC-SCNC: 10 MMOL/L (ref 8–16)
ANISOCYTOSIS BLD QL SMEAR: SLIGHT
AST SERPL-CCNC: 13 U/L (ref 10–40)
BASOPHILS # BLD AUTO: 0.02 K/UL (ref 0–0.2)
BASOPHILS NFR BLD: 0.3 % (ref 0–1.9)
BILIRUB SERPL-MCNC: 0.8 MG/DL (ref 0.1–1)
BILIRUB UR QL STRIP: NEGATIVE
BUN SERPL-MCNC: 11 MG/DL (ref 6–20)
BUN SERPL-MCNC: 11 MG/DL (ref 6–30)
CALCIUM SERPL-MCNC: 8.8 MG/DL (ref 8.7–10.5)
CHLORIDE SERPL-SCNC: 100 MMOL/L (ref 95–110)
CHLORIDE SERPL-SCNC: 101 MMOL/L (ref 95–110)
CLARITY UR REFRACT.AUTO: CLEAR
CO2 SERPL-SCNC: 23 MMOL/L (ref 23–29)
COLOR UR AUTO: NORMAL
CREAT SERPL-MCNC: 1.1 MG/DL (ref 0.5–1.4)
CREAT SERPL-MCNC: 1.1 MG/DL (ref 0.5–1.4)
CRP SERPL-MCNC: 105.6 MG/L (ref 0–8.2)
DIFFERENTIAL METHOD: ABNORMAL
EOSINOPHIL # BLD AUTO: 0 K/UL (ref 0–0.5)
EOSINOPHIL NFR BLD: 0.3 % (ref 0–8)
ERYTHROCYTE [DISTWIDTH] IN BLOOD BY AUTOMATED COUNT: 16.5 % (ref 11.5–14.5)
ERYTHROCYTE [SEDIMENTATION RATE] IN BLOOD BY PHOTOMETRIC METHOD: >120 MM/HR (ref 0–23)
EST. GFR  (NO RACE VARIABLE): >60 ML/MIN/1.73 M^2
GLUCOSE SERPL-MCNC: 119 MG/DL (ref 70–110)
GLUCOSE SERPL-MCNC: 125 MG/DL (ref 70–110)
GLUCOSE UR QL STRIP: NEGATIVE
HCT VFR BLD AUTO: 33.5 % (ref 40–54)
HCT VFR BLD CALC: 35 %PCV (ref 36–54)
HCV AB SERPL QL IA: NORMAL
HGB BLD-MCNC: 10.4 G/DL (ref 14–18)
HGB UR QL STRIP: NEGATIVE
HIV 1+2 AB+HIV1 P24 AG SERPL QL IA: NORMAL
IMM GRANULOCYTES # BLD AUTO: 0.02 K/UL (ref 0–0.04)
IMM GRANULOCYTES NFR BLD AUTO: 0.3 % (ref 0–0.5)
INR PPP: 1.1 (ref 0.8–1.2)
KETONES UR QL STRIP: NEGATIVE
LACTATE SERPL-SCNC: 1.1 MMOL/L (ref 0.5–2.2)
LEUKOCYTE ESTERASE UR QL STRIP: NEGATIVE
LYMPHOCYTES # BLD AUTO: 1.9 K/UL (ref 1–4.8)
LYMPHOCYTES NFR BLD: 32.6 % (ref 18–48)
MCH RBC QN AUTO: 25.3 PG (ref 27–31)
MCHC RBC AUTO-ENTMCNC: 31 G/DL (ref 32–36)
MCV RBC AUTO: 82 FL (ref 82–98)
MONOCYTES # BLD AUTO: 0.7 K/UL (ref 0.3–1)
MONOCYTES NFR BLD: 11.4 % (ref 4–15)
NEUTROPHILS # BLD AUTO: 3.2 K/UL (ref 1.8–7.7)
NEUTROPHILS NFR BLD: 55.1 % (ref 38–73)
NITRITE UR QL STRIP: NEGATIVE
NRBC BLD-RTO: 0 /100 WBC
PH UR STRIP: 6 [PH] (ref 5–8)
PLATELET # BLD AUTO: 256 K/UL (ref 150–450)
PLATELET BLD QL SMEAR: ABNORMAL
PMV BLD AUTO: 10 FL (ref 9.2–12.9)
POC IONIZED CALCIUM: 1.15 MMOL/L (ref 1.06–1.42)
POC TCO2 (MEASURED): 28 MMOL/L (ref 23–29)
POTASSIUM BLD-SCNC: 3.9 MMOL/L (ref 3.5–5.1)
POTASSIUM SERPL-SCNC: 3.8 MMOL/L (ref 3.5–5.1)
PROT SERPL-MCNC: 7.8 G/DL (ref 6–8.4)
PROT UR QL STRIP: NEGATIVE
PROTHROMBIN TIME: 10.9 SEC (ref 9–12.5)
RBC # BLD AUTO: 4.11 M/UL (ref 4.6–6.2)
SAMPLE: ABNORMAL
SODIUM BLD-SCNC: 138 MMOL/L (ref 136–145)
SODIUM SERPL-SCNC: 134 MMOL/L (ref 136–145)
SP GR UR STRIP: 1.01 (ref 1–1.03)
URN SPEC COLLECT METH UR: NORMAL
WBC # BLD AUTO: 5.86 K/UL (ref 3.9–12.7)

## 2022-11-07 PROCEDURE — 86803 HEPATITIS C AB TEST: CPT | Performed by: PHYSICIAN ASSISTANT

## 2022-11-07 PROCEDURE — 99284 EMERGENCY DEPT VISIT MOD MDM: CPT | Mod: 25

## 2022-11-07 PROCEDURE — 99285 PR EMERGENCY DEPT VISIT,LEVEL V: ICD-10-PCS | Mod: ,,, | Performed by: STUDENT IN AN ORGANIZED HEALTH CARE EDUCATION/TRAINING PROGRAM

## 2022-11-07 PROCEDURE — 85652 RBC SED RATE AUTOMATED: CPT | Performed by: EMERGENCY MEDICINE

## 2022-11-07 PROCEDURE — 63600175 PHARM REV CODE 636 W HCPCS

## 2022-11-07 PROCEDURE — 85610 PROTHROMBIN TIME: CPT | Performed by: EMERGENCY MEDICINE

## 2022-11-07 PROCEDURE — 96375 TX/PRO/DX INJ NEW DRUG ADDON: CPT

## 2022-11-07 PROCEDURE — 96374 THER/PROPH/DIAG INJ IV PUSH: CPT

## 2022-11-07 PROCEDURE — 85025 COMPLETE CBC W/AUTO DIFF WBC: CPT | Performed by: EMERGENCY MEDICINE

## 2022-11-07 PROCEDURE — 87040 BLOOD CULTURE FOR BACTERIA: CPT | Mod: 59 | Performed by: EMERGENCY MEDICINE

## 2022-11-07 PROCEDURE — 99285 EMERGENCY DEPT VISIT HI MDM: CPT | Mod: ,,, | Performed by: STUDENT IN AN ORGANIZED HEALTH CARE EDUCATION/TRAINING PROGRAM

## 2022-11-07 PROCEDURE — 86140 C-REACTIVE PROTEIN: CPT | Performed by: EMERGENCY MEDICINE

## 2022-11-07 PROCEDURE — 83605 ASSAY OF LACTIC ACID: CPT | Performed by: EMERGENCY MEDICINE

## 2022-11-07 PROCEDURE — 80053 COMPREHEN METABOLIC PANEL: CPT | Performed by: EMERGENCY MEDICINE

## 2022-11-07 PROCEDURE — 87389 HIV-1 AG W/HIV-1&-2 AB AG IA: CPT | Performed by: PHYSICIAN ASSISTANT

## 2022-11-07 PROCEDURE — 81003 URINALYSIS AUTO W/O SCOPE: CPT | Performed by: EMERGENCY MEDICINE

## 2022-11-07 PROCEDURE — 25000003 PHARM REV CODE 250

## 2022-11-07 RX ORDER — KETOROLAC TROMETHAMINE 30 MG/ML
15 INJECTION, SOLUTION INTRAMUSCULAR; INTRAVENOUS
Status: COMPLETED | OUTPATIENT
Start: 2022-11-07 | End: 2022-11-07

## 2022-11-07 RX ORDER — HYDROCODONE BITARTRATE AND ACETAMINOPHEN 10; 325 MG/1; MG/1
1 TABLET ORAL EVERY 6 HOURS PRN
Qty: 90 TABLET | Refills: 0 | Status: SHIPPED | OUTPATIENT
Start: 2022-11-07 | End: 2022-12-07 | Stop reason: SDUPTHER

## 2022-11-07 RX ORDER — DIAZEPAM 5 MG/1
5 TABLET ORAL EVERY 12 HOURS PRN
Qty: 10 TABLET | Refills: 0 | Status: SHIPPED | OUTPATIENT
Start: 2022-11-07 | End: 2023-12-13

## 2022-11-07 RX ORDER — MORPHINE SULFATE 4 MG/ML
8 INJECTION, SOLUTION INTRAMUSCULAR; INTRAVENOUS
Status: COMPLETED | OUTPATIENT
Start: 2022-11-07 | End: 2022-11-07

## 2022-11-07 RX ORDER — IBUPROFEN 600 MG/1
600 TABLET ORAL EVERY 8 HOURS PRN
Qty: 20 TABLET | Refills: 0 | OUTPATIENT
Start: 2022-11-07 | End: 2022-11-07

## 2022-11-07 RX ORDER — IBUPROFEN 600 MG/1
600 TABLET ORAL EVERY 8 HOURS PRN
Qty: 20 TABLET | Refills: 0 | OUTPATIENT
Start: 2022-11-07 | End: 2022-11-07 | Stop reason: SDUPTHER

## 2022-11-07 RX ORDER — DIAZEPAM 5 MG/1
5 TABLET ORAL EVERY 12 HOURS PRN
Qty: 10 TABLET | Refills: 0 | Status: SHIPPED | OUTPATIENT
Start: 2022-11-07 | End: 2022-11-07 | Stop reason: SDUPTHER

## 2022-11-07 RX ORDER — IBUPROFEN 600 MG/1
600 TABLET ORAL EVERY 8 HOURS PRN
Qty: 20 TABLET | Refills: 0 | Status: SHIPPED | OUTPATIENT
Start: 2022-11-07 | End: 2023-04-18 | Stop reason: SDUPTHER

## 2022-11-07 RX ORDER — DIAZEPAM 5 MG/1
5 TABLET ORAL
Status: COMPLETED | OUTPATIENT
Start: 2022-11-07 | End: 2022-11-07

## 2022-11-07 RX ADMIN — MORPHINE SULFATE 8 MG: 4 INJECTION, SOLUTION INTRAMUSCULAR; INTRAVENOUS at 06:11

## 2022-11-07 RX ADMIN — KETOROLAC TROMETHAMINE 15 MG: 30 INJECTION, SOLUTION INTRAMUSCULAR; INTRAVENOUS at 06:11

## 2022-11-07 RX ADMIN — DIAZEPAM 5 MG: 5 TABLET ORAL at 06:11

## 2022-11-07 NOTE — PROGRESS NOTES
Subjective:       Patient ID: Paul Li Jr. is a 47 y.o. male.    Chief Complaint: Malignant neoplasm involving both nipple and areola of righ    HPI    Returns for follow up for chemotherapy C15D1(Abraxane and PO Aplelisib)  He does need to move his chemo this week due to his son's senior game in [place].  Overall he is doing well. Cramping in hands and feet intermittently. Recommended he try Gatorade and powerade zeros.   He did have a fever last week and went to ER. He did have some abdominal pain, all work up negative, he was given pain medication and this resolved.   Appetite is good.   Recovered well from his right hydrocele on Friday 10/28/2022.  He went to group therapy that helped substantially.   Weight stable  No fevers, chills or infectious complaints       Per Dr. Linn's previous notes: Most recent imaging-   11/4/22: PET scan:  FINDINGS:  Quality of the study: Adequate.     In the head and neck, there are no hypermetabolic lesions worrisome for malignancy. There are no hypermetabolic mucosal lesions, and there are no pathologically enlarged or hypermetabolic lymph nodes.     In the chest, there are no hypermetabolic lesions worrisome for malignancy.  There are no concerning pulmonary nodules or masses, and there are no pathologically enlarged or hypermetabolic lymph nodes.  Postoperative changes of right mastectomy and axillary derick dissection.  Stable subcentimeter right lower lobe solid nodule.     In the abdomen and pelvis, there is physiologic tracer distribution within the abdominal organs and excretion into the genitourinary system.  Sigmoid diverticulosis.     In the bones, sclerotic lesion in T7 demonstrates uptake, mildly increased prior exam with SUV max 5.2 (previously 4.4).  T12 sclerotic focus demonstrates an SUV max of 4.8 (previously 5.8).  Small sclerotic focus involving the L1 vertebral body which appears mildly increased in size with prominent focal uptake SUV max 6.8.  New focal  activity in a non sclerotic portion of the right sacral ala on image 201 with an SUV of 5.8 as well as associated with subtle new sclerosis of the right side of the L5 vertebral body with an SUV of 8.7.     Extensive sclerotic change to the iliac wings, acetabula, sacrum, and pubic rami which appears grossly stable compared to the prior exam. Increased tracer uptake throughout the pelvis, however there also does appear to be diffusely increased uptake compared to the prior exam throughout the bone marrow which may indicate bone marrow hyperplasia.     In the extremities, there are no hypermetabolic lesions worrisome for malignancy.     Impression:     In this patient with breast cancer, there are postoperative changes of right mastectomy and axillary derick dissection.  No evidence of residual/recurrent disease.     New multifocal uptake in the axial and appendicular skeleton against a background of probable bone marrow hyperplasia.  Some of the foci are associated with sclerosis and others have no CT correlates.  Differential considerations include active osteoblastic metastasis, osteoblastic response/healing of prior metastasis, and/or new, early CT-occult metastasis.  Tissue sampling would be required for definitive diagnosis.       - 6/6/2022 PET scan:  FINDINGS:  Quality of the study: Adequate.  In the head and neck, there is nonspecific focal uptake in the right infratemporal fossa without CT correlate which may reflect atypically focal muscular uptake.  There are no other hypermetabolic lesions worrisome for malignancy. There are no hypermetabolic mucosal lesions, and there are no pathologically enlarged or hypermetabolic lymph nodes.  In the chest, there are no hypermetabolic lesions worrisome for malignancy.  No pathologically enlarged or hypermetabolic lymph nodes.  Stable subcentimeter right pulmonary nodules too small to characterize on PET (images 84 and 85). Postsurgical changes of right mastectomy and  axillary node dissection.  In the abdomen and pelvis, there is physiologic tracer distribution within the abdominal organs and excretion into the genitourinary system.  Diffuse sigmoid mild uptake, likely related to metformin use or physiologic activity.  There is sigmoid diverticulosis without CT evidence of diverticulitis.  In the bones, there are hypermetabolic lesions at the T12 and T7 vertebral bodies.  Uptake at the T12 vertebral body measures 5.8 SUV max corresponding to a region of prior sclerosis and T7 with an SUV of 4.4 within the body away from focal sclerosis on the right.  Other sclerotic lesions demonstrate uptake similar to normal marrow activity.  In the extremities, there are no hypermetabolic lesions worrisome for malignancy.  Additional CT findings: Bilateral inguinal hernias.  Large left hydrocele.  Impression:  Interval increased uptake in the T7 body away from sclerosis and T12 body involving a region of prior sclerosis.  Differential considerations for T7 include focal nodular hyperplasia of marrow versus early, CT occult metastasis, and differential considerations for T12 include active osteoblastic metastasis versus healing metastasis.  Other sclerotic lesions demonstrate uptake similar to normal marrow suggesting response to therapy.  Nonspecific focal uptake at the right temporal fossa without CT correlate may indicate atypically focal muscular uptake.  Attention on follow-up     Diagnosis:  Metastatic TNBC progressed (prior Stage IB (L6sA2X8) triple negative invasive ductal carcinoma with lobular features of right breast, retroareolar, Grade 2, Ki67 80%, diagnosed 2019)     Oncology History:  Metastatic  Set up for scans (due to back pain) which are consistent for recurrence.   Imagin/3/2021 MRI T/L spine:  FINDINGS:  Alignment: Within normal limits.  Vertebrae: Low T1 signal intensity in the left T12 vertebral body extending to the left pedicle, S1 and S2 vertebral bodies with  abnormal enhancement.  The vertebral heights are well maintained.  No evidence of acute fractures.  Abnormal appearance of the LEFT sacrum and ilium as well.  Discs: Degenerative disease of the level of L4-L5 with posterior annular fissure.  Conus appears within normal limits terminating at the level of the L2. level.  Cauda equina appears unremarkable.  Mild circumferential disc bulging at the level of T10-T11 abutting the ventral thecal sac.  No significant spinal canal stenosis or neural foraminal narrowing throughout the thoracic spine.  No significant spinal canal stenosis or neural foraminal narrowing throughout the lumbar spine.  Paraspinous muscles and soft tissues: Subcentimeter focal enhancement in the posterior column along the supraspinous ligament at the level of L1-L2 (sagittal series 21, image 10) potentially inflammatory versus neoplastic.  Impression:  Abnormal appearance of the T12, S1, S2 vertebral bodies as well as LEFT sacrum and ilium concerning for metastatic disease in this patient with history of breast cancer.  No evidence for significant central canal narrowing.  Additional findings, as above.  This report was flagged in Epic as abnormal.     6/9/2021 PET scan:  COMPARISON:  MRI thoracic and lumbar spine 06/03/2021  FINDINGS:  Quality of the study: Adequate.  In the head and neck, there are no hypermetabolic lesions worrisome for malignancy. There are no hypermetabolic mucosal lesions, and there are no pathologically enlarged or hypermetabolic lymph nodes.  In the chest, there is postoperative change of right mastectomy/right axillary lymph node dissection.  There is low level hypermetabolic activity with mild soft tissue thickening in the skin/superficial subcutaneous fat of the right chest wall (axial fused image 78) with SUV max 3.6.  There is soft tissue thickening measuring approximately 1.8 x 7.9 cm in the subcutaneous fat of the right chest wall (axial series 3, image 84) with SUV  max 3.1.  There are a few bilateral pulmonary nodules measuring up to 0.3 cm in the left lung (axial series 3, image 88) and 0.5 cm in the right lung (axial series 3, image 84).  These nodules are too small to characterize by PET.  There are no pathologically enlarged or hypermetabolic lymph nodes.  In the abdomen and pelvis, there is physiologic tracer distribution within the abdominal organs and excretion into the genitourinary system.  Subtle sen mesentery and multiple prominent mesenteric lymph nodes measuring up to 1.8 cm in long axis with background activity.  In the bones, there are several hypermetabolic lesions worrisome for malignancy.  Sclerotic lesion in the left T12 vertebral body (axial series 3, image 131) with SUV max 12.  Sclerotic lesions in the sacrum (for example axial series 3, image 188) with SUV max 9.7.  Sclerotic lesions in the left iliac bone (for example axial series 3, image 205) with SUV max 6.  In the extremities, there are no hypermetabolic lesions worrisome for malignancy.  Incidental findings:  Bilateral maxillary antra mucous retention cysts.  Fat containing right inguinal hernia.  Impression:  1. In this patient with right breast carcinoma status post mastectomy/right axillary lymph node dissection, there are multiple sclerotic lesions in the T12 vertebral body, sacrum, and left iliac bone with hypermetabolic activity concerning for active metastatic disease and in keeping with findings on MRI 06/03/2021.  2. Additionally there is low-level hypermetabolic activity with soft tissue thickening in the right chest wall as detailed above.  These findings are nonspecific and may be related to postoperative/post radiation change, with recurrent malignancy not excluded.  3. Nonspecific mesenteric haziness and lymph nodes which may indicate mesenteric adenitis.  Recommend clinical correlation and attention on follow-up.  4. Additional findings as above.  I, Sohan Tillman MD, attest that I  reviewed and interpreted the images.     In summary,   Started aplelisib 8/22/2021   Abraxane C1 started 8/13/2021  We had sent Guardant and discussed results with Molecular tumor board  He also had a repeat bx to confirm metastatic triple negative breast cancer  Plan:   Nab-Paclitaxel and Alpelisib  Reference: https://ascopubs.org/doi/abs/10.1200/JCO.[email or web address]_suppl.1018  Also on Zometa-      - C1D1 Abraxane 8/13/2021  - Started aplelisib 8/22/2021               Oncology History   Malignant neoplasm involving both nipple and areola of right breast in male, estrogen receptor negative   5/1/2019 Imaging Significant Findings     Mammogram and US  Impression:  Right  Mass: Right breast 14 mm mass at the retroareolar anterior position. Assessment: 4 - Suspicious finding. Biopsy is recommended.   Left  There is no mammographic or sonographic evidence of malignancy.  Recommendation:  Biopsy is recommended.      5/2/2019 Biopsy     BREAST, RIGHT, RETROAREOLAR MASS, ULTRASOUND-GUIDED BIOPSY:  Invasive ductal carcinoma with lobular features.  Size of invasive carcinoma: 5 MM in greatest linear dimension within a single      5/2/2019 Breast Tumor Markers     Estrogen: Negative  Progesterone: Negative  HER2: Negative  Ki67: 80      5/17/2019 Cancer Staged     Staging form: Breast, AJCC 8th Edition  - Clinical stage from 5/17/2019: Stage IB (cT1c, cN0, cM0, G2, ER-, IL-, HER2-) - Signed by Richa Bahena MD on 5/17/2019 5/27/2019 - 7/21/2019 Chemotherapy     Treatment Summary   Plan Name: OP BREAST DOSE-DENSE AC - DOXORUBICIN CYCLOPHOSPHAMIDE Q2W  Treatment Goal: Curative  Status: Inactive  Start Date: 5/27/2019  End Date: 7/9/2019  Provider: Richa Bahena MD  Chemotherapy: DOXOrubicin chemo injection 186 mg, 60 mg/m2 = 186 mg, Intravenous, Clinic/HOD 1 time, 4 of 4 cycles  Administration: 186 mg (5/27/2019), 186 mg (6/10/2019), 186 mg (6/24/2019), 186 mg (7/8/2019)  cyclophosphamide (CYTOXAN) 600 mg/m2 = 1,865 mg  in sodium chloride 0.9% 250 mL chemo infusion, 600 mg/m2 = 1,865 mg, Intravenous, Clinic/HOD 1 time, 4 of 4 cycles  Administration: 1,865 mg (5/27/2019), 1,865 mg (6/10/2019), 1,865 mg (6/24/2019), 1,865 mg (7/8/2019)      7/22/2019 - 10/15/2019 Chemotherapy     Treatment Summary   Plan Name: OP PACLITAXEL QW  Treatment Goal: Curative  Status: Inactive  Start Date: 7/24/2019  End Date: 10/8/2019  Provider: Richa Bahena MD  Chemotherapy: PACLitaxel (TAXOL) 80 mg/m2 = 246 mg in sodium chloride 0.9% 250 mL chemo infusion, 80 mg/m2 = 246 mg, Intravenous, Clinic/HOD 1 time, 12 of 12 cycles  Dose modification: 60 mg/m2 (original dose 80 mg/m2, Cycle 3), 55 mg/m2 (original dose 80 mg/m2, Cycle 7), 60 mg/m2 (original dose 80 mg/m2, Cycle 7), 50 mg/m2 (original dose 80 mg/m2, Cycle 9), 50 mg/m2 (original dose 80 mg/m2, Cycle 10)  Administration: 246 mg (7/24/2019), 246 mg (7/31/2019), 186 mg (8/7/2019), 186 mg (8/14/2019), 186 mg (8/21/2019), 186 mg (8/28/2019), 186 mg (9/4/2019), 174 mg (9/11/2019), 156 mg (9/18/2019), 156 mg (9/25/2019), 156 mg (10/1/2019), 156 mg (10/8/2019)      11/22/2019 Breast Surgery     On 11/22/19 Dr whyte performed a right breast mastectomy with SLN biopsy with pathology showing grade 2, 6 mm IDC with extensive treatment effect, no LVI with 1 LN positive 4 mm, negative margins. The on 12/17/19, Dr Whyte performed right axillary dissection with pathology still pending.      11/22/2019 Cancer Staged     ypT1b N1      12/17/2019 Breast Surgery     Surgery: Dr Shima Whyte, [phone] performed right ALND with pathology showing 14 negative lymph nodes.             1/14/2020 Tumor Conference      Post mastectomy radiation therapy: Xeloda, then possible Keytruda trial .      2/3/2020 - 3/23/2020 Radiation Therapy     Treating physician: Speedy Nair MD   Total Dose: 50.4 Gy to the chest wall and regional lymphatics  10 Gy boost to the prostate.   Fractions: 28 fractions at 1.8 Gy per  fraction  5 fractions at 2 Gy per fraction       2/28/2020 -  Chemotherapy     * 4/15/2020 - 9/28/20 completed 6 cycles adjuvant Xeloda 1000 mg/m2 bid days 1-14 every 21 days  Treatment Summary   Plan Name: OP CAPECITABINE 1250MG/M2 BID Q3W  Treatment Goal: Curative  Status: Active  Start Date: 3/20/2020  End Date: 3/20/2020  Provider: Richa Bahena MD  Chemotherapy: [No matching medication found in this treatment plan]                Review of Systems   Constitutional:  Negative for activity change, appetite change, chills, fatigue, fever and unexpected weight change.   HENT:  Negative for dental problem, postnasal drip, rhinorrhea, sinus pressure/congestion, sore throat, trouble swallowing and voice change.    Eyes:  Negative for visual disturbance.   Respiratory:  Negative for cough, shortness of breath and wheezing.    Cardiovascular:  Negative for chest pain, palpitations and leg swelling.   Gastrointestinal:  Negative for abdominal distention, abdominal pain, blood in stool, change in bowel habit, constipation, diarrhea, nausea, vomiting and change in bowel habit.   Endocrine: Negative for cold intolerance and heat intolerance.   Genitourinary:  Negative for decreased urine volume, difficulty urinating, dysuria, frequency, hematuria and urgency.   Musculoskeletal:  Negative for arthralgias, back pain, gait problem, joint swelling, myalgias, neck pain and neck stiffness.        Lymphedema right arm   Integumentary:  Negative for color change, pallor, rash and wound.        Right arm lymphedema    Neurological:  Positive for numbness (improved neuropathy; right hand fingers). Negative for dizziness, weakness, light-headedness and headaches.   Hematological:  Negative for adenopathy. Does not bruise/bleed easily.   Psychiatric/Behavioral:  Negative for dysphoric mood and sleep disturbance. The patient is not nervous/anxious.        Objective:      Physical Exam  Vitals and nursing note reviewed.    Constitutional:       General: He is not in acute distress.     Appearance: Normal appearance. He is well-developed. He is obese. He is not diaphoretic.      Comments: Presents alone  Very pleasant  ECOG= 0   HENT:      Head: Normocephalic and atraumatic.   Eyes:      General: No scleral icterus.     Extraocular Movements: Extraocular movements intact.      Conjunctiva/sclera: Conjunctivae normal.      Pupils: Pupils are equal, round, and reactive to light.   Neck:      Thyroid: No thyromegaly.      Trachea: No tracheal deviation.   Cardiovascular:      Rate and Rhythm: Normal rate and regular rhythm.      Heart sounds: Normal heart sounds. No murmur heard.    No friction rub. No gallop.   Pulmonary:      Effort: Pulmonary effort is normal. No respiratory distress.      Breath sounds: Normal breath sounds. No wheezing, rhonchi or rales.   Chest:      Chest wall: No tenderness.   Abdominal:      General: Bowel sounds are normal. There is no distension.      Palpations: Abdomen is soft. There is no mass.      Tenderness: There is no abdominal tenderness. There is no guarding or rebound.      Comments: No organomegaly   Musculoskeletal:         General: Swelling (chronic RUE lymphedema) present. No tenderness or deformity. Normal range of motion.      Cervical back: Normal range of motion and neck supple. No rigidity or tenderness.      Right lower leg: No edema.      Left lower leg: No edema.   Lymphadenopathy:      Cervical: No cervical adenopathy.   Skin:     General: Skin is warm and dry.      Coloration: Skin is not jaundiced or pale.      Findings: No erythema or rash.   Neurological:      General: No focal deficit present.      Mental Status: He is alert and oriented to person, place, and time. Mental status is at baseline.      Cranial Nerves: No cranial nerve deficit.      Sensory: No sensory deficit.      Motor: No weakness or abnormal muscle tone.      Coordination: Coordination normal.      Gait: Gait  normal.      Deep Tendon Reflexes: Reflexes are normal and symmetric. Reflexes normal.   Psychiatric:         Mood and Affect: Mood normal.         Behavior: Behavior normal.         Thought Content: Thought content normal.         Judgment: Judgment normal.       Labs- reviewed  Assessment:       1. Malignant neoplasm involving both nipple and areola of right breast in male, estrogen receptor negative        2. Bone metastases        3. Leukopenia due to antineoplastic chemotherapy        4. Chemotherapy-induced neutropenia        5. Essential hypertension        6. Morbid obesity with BMI of 50.0-59.9, adult        7. Type 2 diabetes mellitus without complication, without long-term current use of insulin        8. Uncontrolled type 2 diabetes mellitus with hyperglycemia               Plan:     T1-2. PET scan shows relatively stable disease, discussed with Dr. Linn and patient  - Proceed with Cycle 15 Day 1 of abraxane mildred Nirav - delayed to next week to see his son play his senior game [facility] [employer]  [facility]  - niiu last given 11/3 - due last week of Nov    3-4. Resolved on labs today     5. Monitored today; continue current medication and follow up with PCP     6. Diet and exercise    7-8. Monitored glucose today; continue current medications and follow up with endocrinology       Return to clinic in 4 weeks with MD appointment and labs.     Patient is in agreement with the proposed treatment plan. All questions were answered to the patient's satisfaction. Patient knows to call clinic for any new or worsening symptoms and if anything is needed before the next clinic visit.          Michelle Grayson, FNP-C  Hematology & Medical Oncology   [facility] [place] [address]  ph. [phone]  Fax. [phone]    Collaborating physician, Dr. Linn.    Approximately 20 minutes were spent face-to-face with the patient.  Approximately 30 minutes in total were spent on this encounter, which includes  face-to-face time and non-face-to-face time preparing to see the patient (e.g., review of tests), obtaining and/or reviewing separately obtained history, documenting clinical information in the electronic or other health record, independently interpreting results (not separately reported) and communicating results to the patient/family/caregiver, or care coordination (not separately reported).       Route Chart for Scheduling    Med Onc Chart Routing      Follow up with physician 4 weeks.   Follow up with JAC    Infusion scheduling note abraxane next week (on the wait list), again on 11/29 and 12/6 and then see Dr. Linn or PJ or me on 12/20 with abraxane   Injection scheduling note zometa due 12/6 infusion   Labs CBC and CMP   Lab interval: once a week     Imaging    Pharmacy appointment    Other referrals        Treatment Plan Information   OP BREAST NAB-PACLITAXEL D1, D8, D15 + ALPELISIB    Rosa Linn MD   Upcoming Treatment Dates - OP BREAST NAB-PACLITAXEL D1, D8, D15 + ALPELISIB     11/22/2022       Chemotherapy       PACLitaxeL protein-bound (ABRAXANE) 80 mg/m2 = 220 mg in 44 mL infusion  11/29/2022       Chemotherapy       PACLitaxeL protein-bound (ABRAXANE) 80 mg/m2 = 220 mg in 44 mL infusion  12/6/2022       Chemotherapy       PACLitaxeL protein-bound (ABRAXANE) 80 mg/m2 = 220 mg in 44 mL infusion  12/26/2022       Chemotherapy       PACLitaxeL protein-bound (ABRAXANE) 80 mg/m2 = 220 mg in 44 mL infusion    Supportive Plan Information  OP FILGRASTIM 480 MCG   Michelle Grayson, LAUREEN   Upcoming Treatment Dates - OP FILGRASTIM 480 MCG    No upcoming days in selected categories.    Therapy Plan Information  PORT FLUSH  Flushes  heparin, porcine (PF) 100 unit/mL injection flush 500 Units  500 Units, Intravenous, Every visit  sodium chloride 0.9% flush 10 mL  10 mL, Intravenous, Every visit    ZOLEDRONIC ACID (ZOMETA) IV  Medications  zoledronic 4 mg/100 mL infusion 4 mg  4 mg, Intravenous, Every 4  weeks  Flushes  sodium chloride 0.9% 250 mL flush bag  Intravenous, Every 4 weeks  sodium chloride 0.9% flush 10 mL  10 mL, Intravenous, Every 4 weeks  heparin, porcine (PF) 100 unit/mL injection flush 500 Units  500 Units, Intravenous, Every 4 weeks  alteplase injection 2 mg  2 mg, Intra-Catheter, Every 4 weeks

## 2022-11-07 NOTE — TELEPHONE ENCOUNTER
"----- Message from Tanesha Etienne sent at 11/7/2022  1:01 PM CST -----  Regarding: Speak with office  Contact: Bjorn  Consult/Advisory:       Name Of Caller: Pt wife(Bjorn)      Contact Preference?:[phone]       Does patient feel the need to be seen today? No      What is the nature of the call?: Pt wife is returning call back to office. They had a drop call.       Additional Notes:  "Thank you for all that you do for our patients'"      "

## 2022-11-07 NOTE — ED PROVIDER NOTES
Source of History:  Patient and Medical Record, without language barrier or      Chief complaint:  Multiple complaints (Breast cancer on R breast mastectomy, mets to lumbar, pelvis, had chemo on Friday, flank pain on L side tender to touch)      HPI:  Paul Li Jr. is a 47 y.o. male with history of breast cancer on chemotherapy, diabetes, HTN presenting with chief complaint of left sided back pain.  Patient was diagnosed with right-sided breast cancer in 2019.  Patient's cancer recurred and he is known metastases to lumbar spine and pelvis.  Last chemo was 2 weeks ago.  One week ago patient had a procedure for a hydrocele, he has no pain swelling or erythema to the region.  Patient is endorsing a sharp left upper lumbar pain in the paraspinal muscles.  He states it is sharp, worse with movement and is severe.  He has tried muscle relaxers, NSAIDs, and hydrocodone which have not helped.  Pain has been progressive since Saturday.    This is the extent to the patients complaints today here in the emergency department.    ROS: As per HPI and below:  Review of Systems   Constitutional:  Negative for chills and fever.   HENT:  Negative for congestion.    Eyes:  Negative for double vision.   Respiratory:  Negative for cough and shortness of breath.    Cardiovascular:  Negative for chest pain.   Gastrointestinal:  Negative for abdominal pain and nausea.   Genitourinary:  Negative for dysuria and hematuria.   Musculoskeletal:  Positive for back pain (left lumbar). Negative for myalgias.   Skin:  Negative for rash.   Neurological:  Negative for dizziness.     Review of patient's allergies indicates:  No Known Allergies    PMH:  As per HPI and below:  Past Medical History:   Diagnosis Date    Breast cancer     Cancer     R breast    Diabetes mellitus     HTN (hypertension)     Leg edema, right     Prediabetes     Seizures     at age 18 - stress related    Therapy     marital counseling     Past Surgical  History:   Procedure Laterality Date    AXILLARY NODE DISSECTION Right 2019    Procedure: LYMPHADENECTOMY, AXILLARY RIGHT;  Surgeon: Shima Whyte MD;  Location: [facility];  Service: General;  Laterality: Right;    AXILLARY NODE DISSECTION Right 2019    Procedure: LYMPHADENECTOMY, AXILLARY;  Surgeon: Shima Whyte MD;  [facility];  Service: General;  Laterality: Right;    BREAST BIOPSY      EXCISION OF HYDROCELE Right 10/28/2022    Procedure: HYDROCELECTOMY;  Surgeon: Anthony Cordero Jr., MD;  Location: [facility] OR [address] [place] [address];  Service: Urology;  Laterality: Right;  1 hour    INSERTION OF TUNNELED CENTRAL VENOUS CATHETER (CVC) WITH SUBCUTANEOUS PORT Left 2019    Procedure: [id number];  Surgeon: Shima Whyte MD;  Location: [facility] OR [address] [place] [address];  Service: General;  Laterality: Left;    INSERTION OF TUNNELED CENTRAL VENOUS CATHETER (CVC) WITH SUBCUTANEOUS PORT Left 2021    Procedure: INSERTION, SINGLE LUMEN CATHETER WITH PORT, WITH FLUOROSCOPIC GUIDANCE, right;  Surgeon: Gloria Holliday MD;  Location: [facility] OR [address] [place] [address];  Service: General;  Laterality: Left;  rapid covid test    SENTINEL LYMPH NODE BIOPSY Right 2019    Procedure: BIOPSY, LYMPH NODE, SENTINEL RIGHT;  Surgeon: Shima Whyte MD;  Location: [facility];  Service: General;  Laterality: Right;    SIMPLE MASTECTOMY Right 2019    Procedure: MASTECTOMY, SIMPLE RIGHT (CONSENT AM OF) 2.5 hr case;  Surgeon: Shima Whyte MD;  Location: [facility];  Service: General;  Laterality: Right;       Social History     Tobacco Use    Smoking status: Former     Packs/day: 0.25     Years: 15.00     Pack years: 3.75     Types: Cigarettes, Vaping with nicotine     Quit date: 2014     Years since quittin.9    Smokeless tobacco: Never    Tobacco comments:     Social smoker with alcohol    Substance Use Topics    Alcohol use: Yes     Alcohol/week: 0.0 standard drinks     Comment: Rarely    Drug use: Not Currently     Types:  "Marijuana       Vitals:    BP (!) 111/55   Pulse 63   Temp 98.2 °F (36.8 °C) (Oral)   Resp 14   Ht 6' 2" (1.88 m)   Wt (!) 151.5 kg (334 lb)   SpO2 98%   BMI 42.88 kg/m²     Physical Exam  Vitals and nursing note reviewed.   Constitutional:       General: He is not in acute distress.     Appearance: He is not toxic-appearing.   HENT:      Head: Normocephalic and atraumatic.      Nose: Nose normal.      Mouth/Throat:      Mouth: Mucous membranes are moist.   Eyes:      General: No scleral icterus.     Conjunctiva/sclera: Conjunctivae normal.   Cardiovascular:      Rate and Rhythm: Normal rate and regular rhythm.      Pulses: Normal pulses.      Heart sounds: Normal heart sounds. No murmur heard.    No friction rub. No gallop.      Comments: Port in left chest  Pulmonary:      Effort: Pulmonary effort is normal. No respiratory distress.      Breath sounds: Normal breath sounds. No stridor. No wheezing, rhonchi or rales.   Abdominal:      General: Abdomen is flat. There is no distension.      Palpations: Abdomen is soft.      Tenderness: There is no abdominal tenderness. There is no guarding.   Musculoskeletal:         General: Swelling (right upper extremity) present. No deformity.      Cervical back: Normal range of motion and neck supple.      Comments: Compression bandage over the entire right upper extremity   Skin:     General: Skin is warm and dry.      Capillary Refill: Capillary refill takes less than 2 seconds.      Coloration: Skin is not jaundiced.      Findings: No bruising or rash.   Neurological:      Mental Status: He is alert and oriented to person, place, and time. Mental status is at baseline.       Procedures    Laboratory Studies:  Labs that have been ordered have been independently reviewed and interpreted by myself.  Labs Reviewed   CBC W/ AUTO DIFFERENTIAL - Abnormal; Notable for the following components:       Result Value    RBC 4.11 (*)     Hemoglobin 10.4 (*)     Hematocrit 33.5 (*)  "    MCH 25.3 (*)     MCHC 31.0 (*)     RDW 16.5 (*)     All other components within normal limits    Narrative:     Release to patient->Immediate   COMPREHENSIVE METABOLIC PANEL - Abnormal; Notable for the following components:    Sodium 134 (*)     Glucose 119 (*)     Albumin 3.4 (*)     All other components within normal limits    Narrative:     Release to patient->Immediate   SEDIMENTATION RATE - Abnormal; Notable for the following components:    Sed Rate >120 (*)     All other components within normal limits    Narrative:     Release to patient->Immediate   C-REACTIVE PROTEIN - Abnormal; Notable for the following components:    .6 (*)     All other components within normal limits    Narrative:     Release to patient->Immediate   ISTAT PROCEDURE - Abnormal; Notable for the following components:    POC Glucose 125 (*)     POC Hematocrit 35 (*)     All other components within normal limits   CULTURE, BLOOD   CULTURE, BLOOD   HIV 1 / 2 ANTIBODY    Narrative:     Release to patient->Immediate   HEPATITIS C ANTIBODY    Narrative:     Release to patient->Immediate   PROTIME-INR    Narrative:     Release to patient->Immediate   URINALYSIS, REFLEX TO URINE CULTURE    Narrative:     Specimen Source->Urine   LACTIC ACID, PLASMA    Narrative:     Release to patient->Immediate   ISTAT CHEM8       Imaging Results    None       I decided to obtain the patient's medical records.    Medications   morphine injection 8 mg (8 mg Intravenous Given 11/7/22 1826)   ketorolac injection 15 mg (15 mg Intravenous Given 11/7/22 1823)   diazePAM tablet 5 mg (5 mg Oral Given 11/7/22 1822)       MDM:    47 y.o. male on active chemotherapy here with lower back pain    Differential Dx:  Differential includes but is not limited to QL muscle strain, pain due to metastatic cancer, most likely fracture, doubt cauda equina to lack of numbness, tingling, incontinence, urinary retention, saddle anesthesia    ED Management:  Broad workup started.   Will treat patient's symptoms with IV morphine and Toradol and p.o. Valium.  Labs not concerning.  Elevated inflammatory markers likely secondary to widespread metastasis.  On re-examination patient has had significant decrease in pain from severe to mild.  Will discharge patient home with course of NSAIDs and Valium for symptom control.  Patient has follow-up later on this week with heme Onc.  Discussed results, diagnosis, and treatment plan with patient; advised close follow-up with PCP. Reviewed strict return precautions. Patient confirms understanding and ability to comply. Patient was given the opportunity to ask questions prior to discharge and all questions answered.                      Diagnostic Impression:    1. Acute left-sided low back pain without sciatica    2. Lumbar sprain, initial encounter         ED Disposition Condition    Discharge Stable            ED Prescriptions       Medication Sig Dispense Start Date End Date Auth. Provider    diazePAM (VALIUM) 5 MG tablet  (Status: Discontinued) Take 1 tablet (5 mg total) by mouth every 12 (twelve) hours as needed (muscle spasm). 10 tablet 11/7/2022 11/7/2022 Efraín Maier MD    ibuprofen (ADVIL,MOTRIN) 600 MG tablet  (Status: Discontinued) Take 1 tablet (600 mg total) by mouth every 8 (eight) hours as needed for Pain. 20 tablet 11/7/2022 11/7/2022 Efraín Maier MD    diazePAM (VALIUM) 5 MG tablet  (Status: Discontinued) Take 1 tablet (5 mg total) by mouth every 12 (twelve) hours as needed (muscle spasm). 10 tablet 11/7/2022 11/7/2022 Efraín Maier MD    ibuprofen (ADVIL,MOTRIN) 600 MG tablet  (Status: Discontinued) Take 1 tablet (600 mg total) by mouth every 8 (eight) hours as needed for Pain. 20 tablet 11/7/2022 11/7/2022 Efraín Maier MD    ibuprofen (ADVIL,MOTRIN) 600 MG tablet  (Status: Discontinued) Take 1 tablet (600 mg total) by mouth every 8 (eight) hours as needed for Pain. 20 tablet 11/7/2022 11/7/2022 Efraín  MD [place]    ibuprofen (ADVIL,MOTRIN) 600 MG tablet  (Status: Discontinued) Take 1 tablet (600 mg total) by mouth every 8 (eight) hours as needed for Pain. 20 tablet 11/7/2022 11/7/2022 Efraín Maier MD    diazePAM (VALIUM) 5 MG tablet Take 1 tablet (5 mg total) by mouth every 12 (twelve) hours as needed (muscle spasm). 10 tablet 11/7/2022 11/12/2022 Sravanthi Arzate MD    ibuprofen (ADVIL,MOTRIN) 600 MG tablet Take 1 tablet (600 mg total) by mouth every 8 (eight) hours as needed for Pain. 20 tablet 11/7/2022 -- Sravanthi Arzate MD          Follow-up Information       Follow up With Specialties Details Why Contact Info    Kareem Arroyo MD Family Medicine   [address] [place] [address]  [phone]      [facility] - Emergency Dept Emergency Medicine Go to  As needed, If symptoms worsen [id number]  [facility] [address] [phone]             Efraín Maier MD  Resident  11/07/22 7679

## 2022-11-07 NOTE — FIRST PROVIDER EVALUATION
"Medical screening examination initiated.  I have conducted a focused provider triage encounter, findings are as follows:    Brief history of present illness:  46 yo M w/ h/o invasive ductal carcinoma with lobular features of right breast. New onset severe low back pain today. Referred from oncology clinic    Vitals:    11/07/22 1249   BP: 133/84   Pulse: 64   Resp: 18   Temp: 98.2 °F (36.8 °C)   TempSrc: Oral   SpO2: 99%   Weight: (!) 151.5 kg (334 lb)   Height: 6' 2" (1.88 m)       Pertinent physical exam:  TTP in midline L spine    Brief workup plan:  cbc, cmp, UA, blood culture,inflammatory markers, defer MRI decision until full evaluation in room    Preliminary workup initiated; this workup will be continued and followed by the physician or advanced practice provider that is assigned to the patient when roomed.  "

## 2022-11-07 NOTE — TELEPHONE ENCOUNTER
"----- Message from Jenniferdenise Guido sent at 11/7/2022  9:15 AM CST -----  Regarding: Lower back pain  Consult/Advisory:           Name Of Caller: self    Contact Preference?: [phone]    Provider Name:  Temitope Keys    Does patient feel the need to be seen today? no    What is the nature of the call?: requesting a call back to discuss lower back pain he's been having over the weekend           Additional Notes:  "Thank you for all that you do for our patients'"     "

## 2022-11-07 NOTE — TELEPHONE ENCOUNTER
"Call received from pt caregiver via .  C/o low back pain starting Saturday, worse today.   Note read in chart , ER recommended per MD.   Verbalized to caregiver to take pt to ED.   Pt and caregiver inroute to ED from cancer center parking lot because they came to the cancer center "hoping to be seen"   MD notified.   "

## 2022-11-08 ENCOUNTER — PATIENT MESSAGE (OUTPATIENT)
Dept: HEMATOLOGY/ONCOLOGY | Facility: CLINIC | Age: 47
End: 2022-11-08
Payer: MEDICARE

## 2022-11-08 NOTE — DISCHARGE INSTRUCTIONS
It was a pleasure taking care of you today!     Diagnosis: Lumbar mulscle strain  -take valium as needed for muscle strain, do not take at the same time as xanax  -take ibuprofen 3 times a day for 5 days  -use lidocaine patch as needed over tender area of back (purchase at pharmacy)    Follow-Up Plan:  - Follow-up with primary care doctor within 3 - 5 days to discuss your recent ER visit and any additional concerns that you may have.  - Additional testing and/or evaluation as directed by your primary doctor    Return to the Emergency Department for symptoms including but not limited to: persistence or worsening of symptoms, shortness of breath or chest pain, inability to drink without vomiting, passing out/fainting/ loss of consciousness, or if you have other concerns.

## 2022-11-11 ENCOUNTER — CLINICAL SUPPORT (OUTPATIENT)
Dept: REHABILITATION | Facility: HOSPITAL | Age: 47
End: 2022-11-11
Payer: MEDICARE

## 2022-11-11 DIAGNOSIS — R29.3 POOR POSTURE: ICD-10-CM

## 2022-11-11 DIAGNOSIS — I89.0 LYMPHEDEMA OF RIGHT ARM: Primary | ICD-10-CM

## 2022-11-11 DIAGNOSIS — Z74.09 IMPAIRED FUNCTIONAL MOBILITY AND ACTIVITY TOLERANCE: ICD-10-CM

## 2022-11-11 PROCEDURE — 97140 MANUAL THERAPY 1/> REGIONS: CPT

## 2022-11-11 NOTE — PROGRESS NOTES
Physical Therapy Daily Treatment Note       Date: 11/11/2022   Name: Paul Li Jr.  Clinic Number: [id number]    Therapy Diagnosis:   Encounter Diagnoses   Name Primary?    Lymphedema of right arm Yes    Impaired functional mobility and activity tolerance     Poor posture      Physician: Rosa Linn MD    Physician Orders: PT Eval and Treat -RUE lymphedema  Medical Diagnosis: Malignant neoplasm involving both nipple and areola of right breast in male, estrogen receptor negative [C50.021, Z17.1], Lymphedema of right upper extremity   Evaluation Date: 10/10/2022  Authorization Period Expiration: 10/14/22  Plan of Care Certification Period: 1/6/23 (12 weeks)  Visit # / Visits authorized: 6/ 11 (plus evaluation)  Insurance: [employer]  FOTO: 2/3     Time In: 2:02 PM  Time Out: 3:10 PM  Total Billable Time: 68 minutes     Precautions: Standard, Fall, cancer, and Spine, Bony Mets, hx of Seizures, L chest wall port      Subjective     Pt reports:No new complaints. He has kept bandages on since last visit because they didn't bother him. He had to cancel Tuesday's appointment because he went to ER for reaction to a chemo medication. He feels fine now.  He was compliant with self manual lymph drainage  Response to previous treatment: his Right hand is smaller  Pain Scale: Paul rates pain on a scale of 0/10 on VAS.   Pain location: pelvis is sore    Fatigue: tired from full day at [facility]  Functional change: none stated  Treatment:   Chemotherapy: te-adjuvant chemo therapy completed 10/19  Pt is currently on chemotherapy: pt is getting 1 infusion/week for 3 weeks with 1 week break, and he takes oral chemotherapy daily.  Radiation: Completed in February 2020  Surgery date: 12/17/19 pt underwent right axillary lymph node dissection and resection excess lateral tissue; 11/22/19 right simple mastectomy with SLNB; total of 18 lymph nodes  "removed      Objective     Objective Measures updated at progress report unless specified.     LANDMARK RIGHT UE LEFT UE DIFF RUE Diff RUE Diff RUE Diff RUE Diff   Date Eval Eval Eval 10/17/22 10/17/22 10/29/22 10/19/22 11/2/22 11/2/22 11/11/22 11/11/22   E + 8" 50 cm 46.5 cm 3.5 cm 48 cm -2 47.5 cm -0.5 50.5 cm +3 47.5 cm -3   E + 6" 45.5 cm 44 cm 1.5 cm 45 cm -0.5 46.5 cm +1.5 47 cm +0.5 45 cm -2   E + 4" 40 cm 40.5 cm 0.5 cm 40.5 cm +0.5 40.5cm No chg 41 cm +0.5 40.5 cm -0.5   E + 2" 38.5 cm 38.5 cm 0 cm 39 cm +0.5 40 cm +1 39.5 cm -0.5 38.5 cm -1   Elbow 36.5 cm 34 cm 2.5 cm 35.5 cm -1 35.5 cm No chg 36 cm +0.5 34.5 cm -1.5   W+ 8" 37.5cm 33 cm 4.5 cm 38cm +0.5 36.5 cm -1.5 37.5 cm +1 36.5 cm -1   W +  6" 35 cm 29.5 cm 5.5 cm 34 cm -1 34.5 cm +0.5 33.5 cm -1 32.5 cm -1   W + 4" 31.5 cm 25 cm 6.5 cm 31.5 cm No chg 31.5 cm No chg 29.5 cm -2 29 cm -0.5   Wrist 23 cm 20 cm 3 cm 22.5cm -0.5 21.5 cm -1 23 cm +1.5 21 cm -2   DPC 28.5 cm 26 cm 2.5 cm 28 cm -0.5 27.5cm -0.5 28.5 cm +1 27 cm -1.5   IP Thumb 8.5 cm 8 cm 0.5 cm 8.5 cm No chg 8.5 cm No chg 9 cm +0.5 8.5 cm -0.5        Functional Limitations Reporting       CMS Impairment/Limitation/Restriction for FOTO Upper quadrant edema Survey     Therapist reviewed FOTO scores for Paul Li Jr. on 11/4/2022.   FOTO documents entered into [employer] - see Media section.     Limitations Score: 2%  Category: Carrying              Treatment     Paul received individual therapeutic exercises to improve postural correction and alignment, stretching and soft tissue mobility, and strengthening for 5 minutes including the following:     Diaphragmatic breathing, 2 x 5 reps  Scapular retractions, 10 reps  Shoulder rolls, 10 reps each fwd and back.    Paul received the following manual therapy techniques were performed to increased myofascial/soft tissue length, mobility and pliability, increase PROM, AROM and function as well as to decrease pain for 63 minutes      Short Neck- held " due to history of seizures  Clear Healthy Lymph Nodes:  Left Axilla                                                  Right Groin  Open Anastomoses:  Anterior Axillo-Axillary  (AAA)                                    Axillo-Inguinal     (AI)                                    Posterior Axillo-Axillary  (GEREMIAS)      Right Upper Arm (Lateral and Medial)  Right  Antecubital Fossa  Right Dorsal Forearm  Right Volar Forearm  Dorsum Right Hand  Right fingers     Rework Right UE  Rework Anastomoses  Rework Healthy lymph Nodes    PT applies  gauze to right fingers and hand and stockinette, cotton artiflex, and short stretch bandages to pt's RIGHT/LEFT/BILATERAL: right upper extremity. PT added size H tubigrip over bandaging to assist keeping it in place. Pt educated to wear bandaging for next 2-3 days unless it causes pain, numbness, or changes in skin color/temperature, or if they start to fall down.  If removed, pt instructed to roll up bandages and cotton padding and bring to next session. If bandages are removed, pt can wear his tubigrip.  Pt has Vet glove to cover bandages in the shower, and we reviewed directions on how to care for bandages. Pt verbalizes understanding of all topics.    Reviewed wearing time of compression bandages (4 days max). Reviewed guidance to avoid deep massage to right arm, so as not to increase lymphedema.              Home Exercise Program and Patient Education   Education provided re:  - role of PT in multi - disciplinary team, goals for PT  - progress towards goals         Written Home Exercises Provided: Patient instructed to cont prior HEP.  Exercises were reviewed and Paul was able to demonstrate them prior to the end of the session.  Paul demonstrated good  understanding of the education provided.     See EMR under Media for self lymphatic drainage provided prior visit.    Pt has no cultural, educational or language barriers to learning provided.    Assessment     Patient is responding  well to physical therapy. Pt has been cleared by referring physician to receive complete decongestive therapy. He has been compliant with self manual lymph drainage and wearing compression bandages/tubigrip. He kept his bandages on since last session. PT encouraged him to not leave them on for >4 days max.  He presents with substantial reductions in his right arm. Pt received complete decongestive therapy today without issue in clinic. Will gauge response at next session and progress as tolerated.      Pt prognosis is Good. Pt will continue to benefit from skilled outpatient physical therapy to address the deficits listed in the problem list chart on initial evaluation, provide pt/family education and to maximize pt's level of independence in the home and community environment.     Anticipated barriers to physical therapy: advanced disease    Goals as follows:  Short Term Goals (6 weeks)  1. Pt will demo decrease in girth in right upper extremity by >/= 2cm to allow for improved UE symmetry, clothing choice, ROM, and UE function. (progressing, not met)  2. Patient will demonstrate 100% knowledge of lymphedema precautions and signs of infection to allow for reduced risk of lymphedema, reduced risk of infection, and/or exacerbation.  (progressing, not met)  3. Patient will perform self lymph drainage techniques to enhance lymphatic drainage and mobility.  (progressing, not met)  4. Patient will tolerate daily activities with multilayered bandaging to enhance lymphatic drainage and skin elasticity.  (progressing, not met)  5. Pt will tolerate HEP for improved strength, functional mobility, ROM, posture, and endurance. (progressing, not met)        Long Term Goals: ( 12  weeks)  1. Patient to show decreased girth in right upper extremity by >/= 3cm to allow for improved UE symmetry, clothing choice, ROM, and UE function.  (progressing, not met)  2. Patient will show reduction in density to mild or less with improved  contour of limb to allow for improved cosmesis, improved lymphatic drainage, and functional mobility.  (progressing, not met)  3. Patient to bruna/doff compression garment with daily compliance to support lymphatic mobility and skin elasticity.  (progressing, not met)  4. Pt to show improved postural awareness and alignment for improved functional mobility.  (progressing, not met)  5. Pt to be independent and compliant with HEP to allow for improved ROM, reach and carry with functional endurance and strength.    (progressing, not met)           Plan   Outpatient physical therapy 2x week for 12 weeks to include the following:   Manual Therapy, Neuromuscular Re-ed, Orthotic Management and Training, Patient Education, Self Care, Therapeutic Activities, and Therapeutic Exercise.     Plan of care Certification Period: 10/10/2022 to 1/6/23.       Therapist: Katelynn Harrell, PT  11/11/2022

## 2022-11-12 LAB
BACTERIA BLD CULT: NORMAL
BACTERIA BLD CULT: NORMAL

## 2022-11-14 LAB
LEFT EYE DM RETINOPATHY: NEGATIVE
RIGHT EYE DM RETINOPATHY: NEGATIVE

## 2022-11-16 DIAGNOSIS — F43.23 ADJUSTMENT DISORDER WITH MIXED ANXIETY AND DEPRESSED MOOD: ICD-10-CM

## 2022-11-16 DIAGNOSIS — N52.8 OTHER MALE ERECTILE DYSFUNCTION: ICD-10-CM

## 2022-11-16 DIAGNOSIS — Z17.1 MALIGNANT NEOPLASM INVOLVING BOTH NIPPLE AND AREOLA OF RIGHT BREAST IN MALE, ESTROGEN RECEPTOR NEGATIVE: ICD-10-CM

## 2022-11-16 DIAGNOSIS — C50.021 MALIGNANT NEOPLASM INVOLVING BOTH NIPPLE AND AREOLA OF RIGHT BREAST IN MALE, ESTROGEN RECEPTOR NEGATIVE: ICD-10-CM

## 2022-11-16 DIAGNOSIS — R19.7 DIARRHEA, UNSPECIFIED TYPE: ICD-10-CM

## 2022-11-16 RX ORDER — FLUOXETINE HYDROCHLORIDE 20 MG/1
40 CAPSULE ORAL DAILY
Qty: 90 CAPSULE | Refills: 1 | Status: SHIPPED | OUTPATIENT
Start: 2022-11-16 | End: 2023-03-28 | Stop reason: SDUPTHER

## 2022-11-16 RX ORDER — DIPHENOXYLATE HYDROCHLORIDE AND ATROPINE SULFATE 2.5; .025 MG/1; MG/1
1 TABLET ORAL 4 TIMES DAILY PRN
Qty: 60 TABLET | Refills: 2 | Status: SHIPPED | OUTPATIENT
Start: 2022-11-16 | End: 2023-05-25 | Stop reason: SDUPTHER

## 2022-11-16 RX ORDER — FLUOXETINE HYDROCHLORIDE 20 MG/1
40 CAPSULE ORAL DAILY
Qty: 90 CAPSULE | Refills: 1 | Status: CANCELLED | OUTPATIENT
Start: 2022-11-16 | End: 2023-11-16

## 2022-11-16 RX ORDER — TADALAFIL 5 MG/1
10 TABLET ORAL DAILY PRN
Qty: 20 TABLET | Refills: 1 | Status: SHIPPED | OUTPATIENT
Start: 2022-11-16 | End: 2023-11-16

## 2022-11-17 ENCOUNTER — PATIENT MESSAGE (OUTPATIENT)
Dept: UROLOGY | Facility: CLINIC | Age: 47
End: 2022-11-17
Payer: MEDICARE

## 2022-11-17 ENCOUNTER — LAB VISIT (OUTPATIENT)
Dept: LAB | Facility: HOSPITAL | Age: 47
End: 2022-11-17
Attending: NURSE PRACTITIONER
Payer: MEDICARE

## 2022-11-17 ENCOUNTER — OFFICE VISIT (OUTPATIENT)
Dept: HEMATOLOGY/ONCOLOGY | Facility: CLINIC | Age: 47
End: 2022-11-17
Payer: MEDICARE

## 2022-11-17 VITALS
SYSTOLIC BLOOD PRESSURE: 126 MMHG | HEIGHT: 74 IN | OXYGEN SATURATION: 99 % | HEART RATE: 87 BPM | RESPIRATION RATE: 18 BRPM | WEIGHT: 315 LBS | DIASTOLIC BLOOD PRESSURE: 61 MMHG | BODY MASS INDEX: 40.43 KG/M2

## 2022-11-17 DIAGNOSIS — D70.1 LEUKOPENIA DUE TO ANTINEOPLASTIC CHEMOTHERAPY: ICD-10-CM

## 2022-11-17 DIAGNOSIS — E11.9 TYPE 2 DIABETES MELLITUS WITHOUT COMPLICATION, WITHOUT LONG-TERM CURRENT USE OF INSULIN: ICD-10-CM

## 2022-11-17 DIAGNOSIS — T45.1X5A LEUKOPENIA DUE TO ANTINEOPLASTIC CHEMOTHERAPY: ICD-10-CM

## 2022-11-17 DIAGNOSIS — Z17.1 MALIGNANT NEOPLASM INVOLVING BOTH NIPPLE AND AREOLA OF RIGHT BREAST IN MALE, ESTROGEN RECEPTOR NEGATIVE: ICD-10-CM

## 2022-11-17 DIAGNOSIS — C79.51 BONE METASTASES: ICD-10-CM

## 2022-11-17 DIAGNOSIS — E11.65 UNCONTROLLED TYPE 2 DIABETES MELLITUS WITH HYPERGLYCEMIA: ICD-10-CM

## 2022-11-17 DIAGNOSIS — D70.1 CHEMOTHERAPY-INDUCED NEUTROPENIA: ICD-10-CM

## 2022-11-17 DIAGNOSIS — E66.01 MORBID OBESITY WITH BMI OF 50.0-59.9, ADULT: ICD-10-CM

## 2022-11-17 DIAGNOSIS — T45.1X5A CHEMOTHERAPY-INDUCED NEUTROPENIA: ICD-10-CM

## 2022-11-17 DIAGNOSIS — I10 ESSENTIAL HYPERTENSION: ICD-10-CM

## 2022-11-17 DIAGNOSIS — C50.021 MALIGNANT NEOPLASM INVOLVING BOTH NIPPLE AND AREOLA OF RIGHT BREAST IN MALE, ESTROGEN RECEPTOR NEGATIVE: ICD-10-CM

## 2022-11-17 DIAGNOSIS — Z17.1 MALIGNANT NEOPLASM INVOLVING BOTH NIPPLE AND AREOLA OF RIGHT BREAST IN MALE, ESTROGEN RECEPTOR NEGATIVE: Primary | ICD-10-CM

## 2022-11-17 DIAGNOSIS — C50.021 MALIGNANT NEOPLASM INVOLVING BOTH NIPPLE AND AREOLA OF RIGHT BREAST IN MALE, ESTROGEN RECEPTOR NEGATIVE: Primary | ICD-10-CM

## 2022-11-17 LAB
ALBUMIN SERPL BCP-MCNC: 3.6 G/DL (ref 3.5–5.2)
ALP SERPL-CCNC: 115 U/L (ref 55–135)
ALT SERPL W/O P-5'-P-CCNC: 13 U/L (ref 10–44)
ANION GAP SERPL CALC-SCNC: 11 MMOL/L (ref 8–16)
AST SERPL-CCNC: 29 U/L (ref 10–40)
BILIRUB SERPL-MCNC: 0.9 MG/DL (ref 0.1–1)
BUN SERPL-MCNC: 19 MG/DL (ref 6–20)
CALCIUM SERPL-MCNC: 9.1 MG/DL (ref 8.7–10.5)
CHLORIDE SERPL-SCNC: 103 MMOL/L (ref 95–110)
CO2 SERPL-SCNC: 23 MMOL/L (ref 23–29)
CREAT SERPL-MCNC: 1.4 MG/DL (ref 0.5–1.4)
ERYTHROCYTE [DISTWIDTH] IN BLOOD BY AUTOMATED COUNT: 16.6 % (ref 11.5–14.5)
EST. GFR  (NO RACE VARIABLE): >60 ML/MIN/1.73 M^2
GLUCOSE SERPL-MCNC: 167 MG/DL (ref 70–110)
HCT VFR BLD AUTO: 32.7 % (ref 40–54)
HGB BLD-MCNC: 10.7 G/DL (ref 14–18)
IMM GRANULOCYTES # BLD AUTO: 0.02 K/UL (ref 0–0.04)
MCH RBC QN AUTO: 25.1 PG (ref 27–31)
MCHC RBC AUTO-ENTMCNC: 32.7 G/DL (ref 32–36)
MCV RBC AUTO: 77 FL (ref 82–98)
NEUTROPHILS # BLD AUTO: 5.4 K/UL (ref 1.8–7.7)
PLATELET # BLD AUTO: 222 K/UL (ref 150–450)
PMV BLD AUTO: 10.1 FL (ref 9.2–12.9)
POTASSIUM SERPL-SCNC: 4.2 MMOL/L (ref 3.5–5.1)
PROT SERPL-MCNC: 7.6 G/DL (ref 6–8.4)
RBC # BLD AUTO: 4.26 M/UL (ref 4.6–6.2)
SODIUM SERPL-SCNC: 137 MMOL/L (ref 136–145)
WBC # BLD AUTO: 6.95 K/UL (ref 3.9–12.7)

## 2022-11-17 PROCEDURE — 3008F BODY MASS INDEX DOCD: CPT | Mod: CPTII,S$GLB,, | Performed by: NURSE PRACTITIONER

## 2022-11-17 PROCEDURE — 99215 OFFICE O/P EST HI 40 MIN: CPT | Mod: S$GLB,,, | Performed by: NURSE PRACTITIONER

## 2022-11-17 PROCEDURE — 3008F PR BODY MASS INDEX (BMI) DOCUMENTED: ICD-10-PCS | Mod: CPTII,S$GLB,, | Performed by: NURSE PRACTITIONER

## 2022-11-17 PROCEDURE — 4010F PR ACE/ARB THEARPY RXD/TAKEN: ICD-10-PCS | Mod: CPTII,S$GLB,, | Performed by: NURSE PRACTITIONER

## 2022-11-17 PROCEDURE — 3044F HG A1C LEVEL LT 7.0%: CPT | Mod: CPTII,S$GLB,, | Performed by: NURSE PRACTITIONER

## 2022-11-17 PROCEDURE — 1159F PR MEDICATION LIST DOCUMENTED IN MEDICAL RECORD: ICD-10-PCS | Mod: CPTII,S$GLB,, | Performed by: NURSE PRACTITIONER

## 2022-11-17 PROCEDURE — 3078F DIAST BP <80 MM HG: CPT | Mod: CPTII,S$GLB,, | Performed by: NURSE PRACTITIONER

## 2022-11-17 PROCEDURE — 3074F SYST BP LT 130 MM HG: CPT | Mod: CPTII,S$GLB,, | Performed by: NURSE PRACTITIONER

## 2022-11-17 PROCEDURE — 99999 PR PBB SHADOW E&M-EST. PATIENT-LVL V: ICD-10-PCS | Mod: PBBFAC,,, | Performed by: NURSE PRACTITIONER

## 2022-11-17 PROCEDURE — 3044F PR MOST RECENT HEMOGLOBIN A1C LEVEL <7.0%: ICD-10-PCS | Mod: CPTII,S$GLB,, | Performed by: NURSE PRACTITIONER

## 2022-11-17 PROCEDURE — 1159F MED LIST DOCD IN RCRD: CPT | Mod: CPTII,S$GLB,, | Performed by: NURSE PRACTITIONER

## 2022-11-17 PROCEDURE — 4010F ACE/ARB THERAPY RXD/TAKEN: CPT | Mod: CPTII,S$GLB,, | Performed by: NURSE PRACTITIONER

## 2022-11-17 PROCEDURE — 85027 COMPLETE CBC AUTOMATED: CPT | Performed by: NURSE PRACTITIONER

## 2022-11-17 PROCEDURE — 99215 PR OFFICE/OUTPT VISIT, EST, LEVL V, 40-54 MIN: ICD-10-PCS | Mod: S$GLB,,, | Performed by: NURSE PRACTITIONER

## 2022-11-17 PROCEDURE — 1160F RVW MEDS BY RX/DR IN RCRD: CPT | Mod: CPTII,S$GLB,, | Performed by: NURSE PRACTITIONER

## 2022-11-17 PROCEDURE — 3074F PR MOST RECENT SYSTOLIC BLOOD PRESSURE < 130 MM HG: ICD-10-PCS | Mod: CPTII,S$GLB,, | Performed by: NURSE PRACTITIONER

## 2022-11-17 PROCEDURE — 3078F PR MOST RECENT DIASTOLIC BLOOD PRESSURE < 80 MM HG: ICD-10-PCS | Mod: CPTII,S$GLB,, | Performed by: NURSE PRACTITIONER

## 2022-11-17 PROCEDURE — 99999 PR PBB SHADOW E&M-EST. PATIENT-LVL V: CPT | Mod: PBBFAC,,, | Performed by: NURSE PRACTITIONER

## 2022-11-17 PROCEDURE — 80053 COMPREHEN METABOLIC PANEL: CPT | Performed by: NURSE PRACTITIONER

## 2022-11-17 PROCEDURE — 36415 COLL VENOUS BLD VENIPUNCTURE: CPT | Performed by: NURSE PRACTITIONER

## 2022-11-17 PROCEDURE — 1160F PR REVIEW ALL MEDS BY PRESCRIBER/CLIN PHARMACIST DOCUMENTED: ICD-10-PCS | Mod: CPTII,S$GLB,, | Performed by: NURSE PRACTITIONER

## 2022-11-18 ENCOUNTER — TELEPHONE (OUTPATIENT)
Dept: REHABILITATION | Facility: HOSPITAL | Age: 47
End: 2022-11-18
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
## 2022-11-21 ENCOUNTER — PATIENT MESSAGE (OUTPATIENT)
Dept: OTHER | Facility: OTHER | Age: 47
End: 2022-11-21
Payer: MEDICARE

## 2022-11-22 ENCOUNTER — INFUSION (OUTPATIENT)
Dept: INFUSION THERAPY | Facility: HOSPITAL | Age: 47
End: 2022-11-22
Payer: MEDICARE

## 2022-11-22 VITALS
SYSTOLIC BLOOD PRESSURE: 122 MMHG | RESPIRATION RATE: 18 BRPM | HEART RATE: 62 BPM | DIASTOLIC BLOOD PRESSURE: 69 MMHG | WEIGHT: 315 LBS | HEIGHT: 74 IN | BODY MASS INDEX: 40.43 KG/M2

## 2022-11-22 DIAGNOSIS — Z17.1 MALIGNANT NEOPLASM INVOLVING BOTH NIPPLE AND AREOLA OF RIGHT BREAST IN MALE, ESTROGEN RECEPTOR NEGATIVE: Primary | ICD-10-CM

## 2022-11-22 DIAGNOSIS — C50.021 MALIGNANT NEOPLASM INVOLVING BOTH NIPPLE AND AREOLA OF RIGHT BREAST IN MALE, ESTROGEN RECEPTOR NEGATIVE: Primary | ICD-10-CM

## 2022-11-22 PROCEDURE — 96361 HYDRATE IV INFUSION ADD-ON: CPT

## 2022-11-22 PROCEDURE — 96413 CHEMO IV INFUSION 1 HR: CPT

## 2022-11-22 PROCEDURE — 96360 HYDRATION IV INFUSION INIT: CPT

## 2022-11-22 PROCEDURE — 63600175 PHARM REV CODE 636 W HCPCS: Performed by: INTERNAL MEDICINE

## 2022-11-22 PROCEDURE — 25000003 PHARM REV CODE 250: Performed by: INTERNAL MEDICINE

## 2022-11-22 PROCEDURE — A4216 STERILE WATER/SALINE, 10 ML: HCPCS | Performed by: INTERNAL MEDICINE

## 2022-11-22 RX ORDER — HEPARIN 100 UNIT/ML
500 SYRINGE INTRAVENOUS
Status: DISCONTINUED | OUTPATIENT
Start: 2022-11-22 | End: 2022-11-22 | Stop reason: HOSPADM

## 2022-11-22 RX ORDER — SODIUM CHLORIDE 9 MG/ML
INJECTION, SOLUTION INTRAVENOUS CONTINUOUS
Status: DISCONTINUED | OUTPATIENT
Start: 2022-11-22 | End: 2022-11-22 | Stop reason: HOSPADM

## 2022-11-22 RX ORDER — SODIUM CHLORIDE 0.9 % (FLUSH) 0.9 %
10 SYRINGE (ML) INJECTION
Status: DISCONTINUED | OUTPATIENT
Start: 2022-11-22 | End: 2022-11-22 | Stop reason: HOSPADM

## 2022-11-22 RX ADMIN — SODIUM CHLORIDE: 0.9 INJECTION, SOLUTION INTRAVENOUS at 09:11

## 2022-11-22 RX ADMIN — PACLITAXEL 220 MG: 100 INJECTION, POWDER, LYOPHILIZED, FOR SUSPENSION INTRAVENOUS at 11:11

## 2022-11-22 RX ADMIN — SODIUM CHLORIDE 500 ML: 0.9 INJECTION, SOLUTION INTRAVENOUS at 09:11

## 2022-11-22 RX ADMIN — HEPARIN 500 UNITS: 100 SYRINGE at 11:11

## 2022-11-22 RX ADMIN — Medication 10 ML: at 11:11

## 2022-11-22 NOTE — PLAN OF CARE
Abraxane IVF complete and tolerated well. PAC flushed with NS, positive blood return hep locked and de accessed. Pt d/c home ambulated away from unit independently, no concerns at this time. AVS declined uses my chart.

## 2022-11-25 ENCOUNTER — TELEPHONE (OUTPATIENT)
Dept: REHABILITATION | Facility: HOSPITAL | Age: 47
End: 2022-11-25

## 2022-11-25 NOTE — TELEPHONE ENCOUNTER
Called pt to see if he was going to make it to his PT appointment today, 11/25/22 at 3:00pm. Pt did not answer his phone and voicemail was full.

## 2022-11-29 ENCOUNTER — OFFICE VISIT (OUTPATIENT)
Dept: UROLOGY | Facility: CLINIC | Age: 47
End: 2022-11-29
Payer: MEDICARE

## 2022-11-29 ENCOUNTER — LAB VISIT (OUTPATIENT)
Dept: LAB | Facility: HOSPITAL | Age: 47
End: 2022-11-29
Payer: MEDICARE

## 2022-11-29 ENCOUNTER — CLINICAL SUPPORT (OUTPATIENT)
Dept: REHABILITATION | Facility: HOSPITAL | Age: 47
End: 2022-11-29
Payer: MEDICARE

## 2022-11-29 VITALS
BODY MASS INDEX: 40.43 KG/M2 | DIASTOLIC BLOOD PRESSURE: 83 MMHG | WEIGHT: 315 LBS | HEART RATE: 80 BPM | SYSTOLIC BLOOD PRESSURE: 130 MMHG | HEIGHT: 74 IN

## 2022-11-29 DIAGNOSIS — N40.0 BENIGN PROSTATIC HYPERPLASIA, UNSPECIFIED WHETHER LOWER URINARY TRACT SYMPTOMS PRESENT: ICD-10-CM

## 2022-11-29 DIAGNOSIS — R29.3 POOR POSTURE: ICD-10-CM

## 2022-11-29 DIAGNOSIS — N43.3 HYDROCELE, UNSPECIFIED HYDROCELE TYPE: Primary | ICD-10-CM

## 2022-11-29 DIAGNOSIS — I89.0 LYMPHEDEMA OF RIGHT ARM: Primary | ICD-10-CM

## 2022-11-29 DIAGNOSIS — Z74.09 IMPAIRED FUNCTIONAL MOBILITY AND ACTIVITY TOLERANCE: ICD-10-CM

## 2022-11-29 DIAGNOSIS — Z98.890 POST-OPERATIVE STATE: ICD-10-CM

## 2022-11-29 LAB — COMPLEXED PSA SERPL-MCNC: 1.1 NG/ML (ref 0–4)

## 2022-11-29 PROCEDURE — 97140 MANUAL THERAPY 1/> REGIONS: CPT

## 2022-11-29 PROCEDURE — 36415 COLL VENOUS BLD VENIPUNCTURE: CPT | Performed by: UROLOGY

## 2022-11-29 PROCEDURE — 3044F PR MOST RECENT HEMOGLOBIN A1C LEVEL <7.0%: ICD-10-PCS | Mod: CPTII,S$GLB,, | Performed by: UROLOGY

## 2022-11-29 PROCEDURE — 3008F BODY MASS INDEX DOCD: CPT | Mod: CPTII,S$GLB,, | Performed by: UROLOGY

## 2022-11-29 PROCEDURE — 4010F ACE/ARB THERAPY RXD/TAKEN: CPT | Mod: CPTII,S$GLB,, | Performed by: UROLOGY

## 2022-11-29 PROCEDURE — 3075F SYST BP GE 130 - 139MM HG: CPT | Mod: CPTII,S$GLB,, | Performed by: UROLOGY

## 2022-11-29 PROCEDURE — 1159F PR MEDICATION LIST DOCUMENTED IN MEDICAL RECORD: ICD-10-PCS | Mod: CPTII,S$GLB,, | Performed by: UROLOGY

## 2022-11-29 PROCEDURE — 1159F MED LIST DOCD IN RCRD: CPT | Mod: CPTII,S$GLB,, | Performed by: UROLOGY

## 2022-11-29 PROCEDURE — 84153 ASSAY OF PSA TOTAL: CPT | Performed by: UROLOGY

## 2022-11-29 PROCEDURE — 3044F HG A1C LEVEL LT 7.0%: CPT | Mod: CPTII,S$GLB,, | Performed by: UROLOGY

## 2022-11-29 PROCEDURE — 3079F PR MOST RECENT DIASTOLIC BLOOD PRESSURE 80-89 MM HG: ICD-10-PCS | Mod: CPTII,S$GLB,, | Performed by: UROLOGY

## 2022-11-29 PROCEDURE — 1160F RVW MEDS BY RX/DR IN RCRD: CPT | Mod: CPTII,S$GLB,, | Performed by: UROLOGY

## 2022-11-29 PROCEDURE — 99024 PR POST-OP FOLLOW-UP VISIT: ICD-10-PCS | Mod: S$GLB,,, | Performed by: UROLOGY

## 2022-11-29 PROCEDURE — 3075F PR MOST RECENT SYSTOLIC BLOOD PRESS GE 130-139MM HG: ICD-10-PCS | Mod: CPTII,S$GLB,, | Performed by: UROLOGY

## 2022-11-29 PROCEDURE — 3079F DIAST BP 80-89 MM HG: CPT | Mod: CPTII,S$GLB,, | Performed by: UROLOGY

## 2022-11-29 PROCEDURE — 4010F PR ACE/ARB THEARPY RXD/TAKEN: ICD-10-PCS | Mod: CPTII,S$GLB,, | Performed by: UROLOGY

## 2022-11-29 PROCEDURE — 99999 PR PBB SHADOW E&M-EST. PATIENT-LVL III: ICD-10-PCS | Mod: PBBFAC,,, | Performed by: UROLOGY

## 2022-11-29 PROCEDURE — 3008F PR BODY MASS INDEX (BMI) DOCUMENTED: ICD-10-PCS | Mod: CPTII,S$GLB,, | Performed by: UROLOGY

## 2022-11-29 PROCEDURE — 1160F PR REVIEW ALL MEDS BY PRESCRIBER/CLIN PHARMACIST DOCUMENTED: ICD-10-PCS | Mod: CPTII,S$GLB,, | Performed by: UROLOGY

## 2022-11-29 PROCEDURE — 99999 PR PBB SHADOW E&M-EST. PATIENT-LVL III: CPT | Mod: PBBFAC,,, | Performed by: UROLOGY

## 2022-11-29 PROCEDURE — 99024 POSTOP FOLLOW-UP VISIT: CPT | Mod: S$GLB,,, | Performed by: UROLOGY

## 2022-11-29 NOTE — PROGRESS NOTES
Subjective:       Patient ID: Paul Li Jr. is a 47 y.o. male.    Chief Complaint: Hydrocele (Post op hydrocelectomy, has a small area that isn't healing too well.)    HPI patient is status post hydrocele repair.  He has 1 slowly healing area at the inferior aspect of the wound.  Minimal drainage.  He will apply Neosporin ointment to it.  He is 46 needs a PSA since he is never had 1    Past Medical History:   Diagnosis Date    Breast cancer     Cancer     R breast    Diabetes mellitus     HTN (hypertension)     Leg edema, right     Prediabetes     Seizures     at age 18 - stress related    Therapy     marital counseling       Past Surgical History:   Procedure Laterality Date    AXILLARY NODE DISSECTION Right 11/22/2019    Procedure: LYMPHADENECTOMY, AXILLARY RIGHT;  Surgeon: Shima Whyte MD;  Location: [facility];  Service: General;  Laterality: Right;    AXILLARY NODE DISSECTION Right 12/17/2019    Procedure: LYMPHADENECTOMY, AXILLARY;  Surgeon: Shima Whyte MD;  Location: [address];  Service: General;  Laterality: Right;    BREAST BIOPSY      EXCISION OF HYDROCELE Right 10/28/2022    Procedure: HYDROCELECTOMY;  Surgeon: Anthony Cordero Jr., MD;  Location: [address];  Service: Urology;  Laterality: Right;  1 hour    INSERTION OF TUNNELED CENTRAL VENOUS CATHETER (CVC) WITH SUBCUTANEOUS PORT Left 5/24/2019    Procedure: [id number];  Surgeon: Shima Whyte MD;  Location: [address];  Service: General;  Laterality: Left;    INSERTION OF TUNNELED CENTRAL VENOUS CATHETER (CVC) WITH SUBCUTANEOUS PORT Left 9/17/2021    Procedure: INSERTION, SINGLE LUMEN CATHETER WITH PORT, WITH FLUOROSCOPIC GUIDANCE, right;  Surgeon: Gloria Holliday MD;  Location: [address];  Service: General;  Laterality: Left;  rapid covid test    SENTINEL LYMPH NODE BIOPSY Right 11/22/2019    Procedure: BIOPSY, LYMPH NODE, SENTINEL RIGHT;  Surgeon: Shima Whyte MD;  Location: [facility];  Service: General;   Laterality: Right;    SIMPLE MASTECTOMY Right 2019    Procedure: MASTECTOMY, SIMPLE RIGHT (CONSENT AM OF) 2.5 hr case;  Surgeon: Shima Whyte MD;  Location: [facility];  Service: General;  Laterality: Right;       Family History   Problem Relation Age of Onset    Hypertension Mother     Diabetes Mother     Hypertension Father     Lupus Father     Post-traumatic stress disorder Brother     No Known Problems Maternal Grandmother     Colon cancer Maternal Grandfather     Breast cancer Paternal Grandmother     No Known Problems Paternal Grandfather     No Known Problems Sister     No Known Problems Maternal Aunt     No Known Problems Maternal Uncle     Fibromyalgia Paternal Aunt     Lupus Paternal Aunt     No Known Problems Paternal Uncle     No Known Problems Brother     No Known Problems Sister     No Known Problems Son     No Known Problems Son     No Known Problems Son     No Known Problems Son        Social History     Socioeconomic History    Marital status: Single    Number of children: 3   Occupational History    Occupation:    Tobacco Use    Smoking status: Former     Packs/day: 0.25     Years: 15.00     Pack years: 3.75     Types: Cigarettes, Vaping with nicotine     Quit date: 2014     Years since quittin.0    Smokeless tobacco: Never    Tobacco comments:     Social smoker with alcohol    Substance and Sexual Activity    Alcohol use: Yes     Alcohol/week: 0.0 standard drinks     Comment: Rarely    Drug use: Not Currently     Types: Marijuana    Sexual activity: Yes     Partners: Female     Birth control/protection: None   Social History Narrative    From [place]    Lives with wife    3 sons in college    Wife recent loss of 6 month pregnancy (May 1, 2019)     Social Determinants of Health     Financial Resource Strain: High Risk    Difficulty of Paying Living Expenses: Hard   Food Insecurity: No Food Insecurity    Worried About Running Out of Food in the Last Year: Never true     Ran Out of Food in the Last Year: Never true   Transportation Needs: Unmet Transportation Needs    Lack of Transportation (Medical): Yes    Lack of Transportation (Non-Medical): No   Physical Activity: Insufficiently Active    Days of Exercise per Week: 3 days    Minutes of Exercise per Session: 30 min   Stress: Stress Concern Present    Feeling of Stress : To some extent   Social Connections: Unknown    Frequency of Communication with Friends and Family: More than three times a week    Frequency of Social Gatherings with Friends and Family: Twice a week    Active Member of Clubs or Organizations: Yes    Attends Club or Organization Meetings: More than 4 times per year    Marital Status:    Housing Stability: High Risk    Unable to Pay for Housing in the Last Year: Yes    Number of Places Lived in the Last Year: 1    Unstable Housing in the Last Year: No       Allergies:  Patient has no known allergies.    Medications:    Current Outpatient Medications:     alpelisib 300 mg/day (150 mg x 2) Tab, Take 2 tablets (300 mg) by mouth once daily., Disp: 60 tablet, Rfl: 3    ALPRAZolam (XANAX) 0.5 MG tablet, Take 1 tablet (0.5 mg total) by mouth 3 (three) times daily as needed for Insomnia or Anxiety., Disp: 60 tablet, Rfl: 0    amLODIPine (NORVASC) 10 MG tablet, TAKE 1 TABLET (10 MG) BY MOUTH EVERY DAY, Disp: 90 tablet, Rfl: 3    calcium-vitamin D3 (OYSTER SHELL CALCIUM-VIT D3) 500 mg-5 mcg (200 unit) per tablet, Take 1 tablet by mouth 2 (two) times daily., Disp: 180 tablet, Rfl: 3    diazePAM (VALIUM) 5 MG tablet, Take 1 tablet (5 mg total) by mouth every 12 (twelve) hours as needed (muscle spasm)., Disp: 10 tablet, Rfl: 0    diphenoxylate-atropine 2.5-0.025 mg (LOMOTIL) 2.5-0.025 mg per tablet, Take 1 tablet by mouth 4 (four) times daily as needed for Diarrhea., Disp: 60 tablet, Rfl: 2    dulaglutide (TRULICITY) 1.5 mg/0.5 mL pen injector, Inject 1.5 mg into the skin once a week., Disp: 4 pen, Rfl: 2     "FLUoxetine 20 MG capsule, Take 2 capsules (40 mg total) by mouth once daily., Disp: 90 capsule, Rfl: 1    FLUoxetine 40 MG capsule, Take 2 capsules (80 mg total) by mouth once daily., Disp: 60 capsule, Rfl: 1    gabapentin (NEURONTIN) 300 MG capsule, Take 1 capsule (300 mg total) by mouth 3 (three) times daily., Disp: 90 capsule, Rfl: 11    hydroCHLOROthiazide (HYDRODIURIL) 25 MG tablet, TAKE 1 TABLET BY MOUTH ONCE DAILY, Disp: 90 tablet, Rfl: 3    HYDROcodone-acetaminophen (NORCO)  mg per tablet, Take 1 tablet by mouth every 6 (six) hours as needed for Pain., Disp: 90 tablet, Rfl: 0    ibuprofen (ADVIL,MOTRIN) 600 MG tablet, Take 1 tablet (600 mg total) by mouth every 8 (eight) hours as needed for Pain., Disp: 20 tablet, Rfl: 0    insulin detemir U-100 (LEVEMIR FLEXTOUCH U-100 INSULN) 100 unit/mL (3 mL) InPn pen, Inject 21 Units into the skin every evening., Disp: 6 mL, Rfl: 2    insulin lispro (HUMALOG KWIKPEN INSULIN) 100 unit/mL pen, Inject 5 Units into the skin 3 (three) times daily., Disp: 6 mL, Rfl: 11    lancets 33 gauge Misc, 1 lancet by Misc.(Non-Drug; Combo Route) route once daily., Disp: 100 each, Rfl: 3    lancing device Misc, 1 Device by Misc.(Non-Drug; Combo Route) route 2 (two) times daily with meals., Disp: 1 each, Rfl: 0    LIDOcaine-prilocaine (EMLA) cream, Apply topically as needed., Disp: 30 g, Rfl: 0    losartan (COZAAR) 50 MG tablet, Take 2 tablets by mouth once daily, Disp: 180 tablet, Rfl: 3    metFORMIN (GLUCOPHAGE) 500 MG tablet, Take 2 tablets (1,000 mg total) by mouth 2 (two) times daily with meals. Needs appointment for future refills, Disp: 120 tablet, Rfl: 2    oxyCODONE (ROXICODONE) 5 MG immediate release tablet, Take 1 tablet (5 mg total) by mouth every 4 (four) hours as needed for Pain., Disp: 5 tablet, Rfl: 0    pen needle, diabetic (BD ULTRA-FINE TANESHA PEN NEEDLE) 32 gauge x 5/32" Ndle, Use as directed with novolog and levemir, Disp: 100 each, Rfl: 5    tadalafiL (CIALIS) 5 " MG tablet, Take 2 tablets (10 mg total) by mouth daily as needed for Erectile Dysfunction., Disp: 20 tablet, Rfl: 1    traZODone (DESYREL) 50 MG tablet, Take 1 tablet (50 mg total) by mouth every evening., Disp: 30 tablet, Rfl: 0  No current facility-administered medications for this visit.    Facility-Administered Medications Ordered in Other Visits:     0.9%  NaCl infusion, , Intravenous, Continuous, Rosa Linn MD, Stopped at 11/19/21 1634    alteplase injection 2 mg, 2 mg, Intra-Catheter, PRN, Rosa Linn MD    heparin, porcine (PF) 100 unit/mL injection flush 500 Units, 500 Units, Intravenous, 1 time in Clinic/HOD, Richa Bahena MD    heparin, porcine (PF) 100 unit/mL injection flush 500 Units, 500 Units, Intravenous, PRN, Rosa Linn MD    heparin, porcine (PF) 100 unit/mL injection flush 500 Units, 500 Units, Intravenous, PRN, Rosa Linn MD, 500 Units at 11/19/21 1635    sodium chloride 0.9% 250 mL flush bag, , Intravenous, 1 time in Clinic/HOD, Rosa Linn MD    sodium chloride 0.9% flush 10 mL, 10 mL, Intravenous, 1 time in Clinic/HOD, Richa Bahena MD    sodium chloride 0.9% flush 10 mL, 10 mL, Intravenous, PRN, Rosa Linn MD, 10 mL at 11/19/21 1501    sodium chloride 0.9% flush 10 mL, 10 mL, Intravenous, PRN, Rosa Linn MD, 10 mL at 11/19/21 1635    Review of Systems   Constitutional:  Negative for activity change, appetite change, chills, diaphoresis, fatigue, fever and unexpected weight change.   HENT:  Negative for congestion, dental problem, hearing loss, mouth sores, postnasal drip, rhinorrhea, sinus pressure and trouble swallowing.    Eyes:  Negative for pain, discharge and itching.   Respiratory:  Negative for apnea, cough, choking, chest tightness, shortness of breath and wheezing.    Cardiovascular:  Negative for chest pain, palpitations and leg swelling.   Gastrointestinal:  Negative for abdominal distention, abdominal pain, anal bleeding, blood in stool,  constipation, diarrhea, nausea, rectal pain and vomiting.   Endocrine: Negative for polydipsia and polyuria.   Genitourinary:  Negative for decreased urine volume, difficulty urinating, dysuria, enuresis, flank pain, frequency, genital sores, hematuria, penile discharge, penile pain, penile swelling, scrotal swelling, testicular pain and urgency.   Musculoskeletal:  Negative for arthralgias, back pain and myalgias.   Skin:  Negative for color change, rash and wound.   Neurological:  Negative for dizziness, syncope, speech difficulty, light-headedness and headaches.   Hematological:  Negative for adenopathy. Does not bruise/bleed easily.   Psychiatric/Behavioral:  Negative for behavioral problems, confusion, hallucinations and sleep disturbance.      Objective:      Physical Exam  Constitutional:       Appearance: He is well-developed.   HENT:      Head: Normocephalic.   Cardiovascular:      Rate and Rhythm: Normal rate.   Pulmonary:      Effort: Pulmonary effort is normal.   Abdominal:      Palpations: Abdomen is soft.   Genitourinary:     Comments: Small opening at the inferior aspect of the wound with no active drainage  Skin:     General: Skin is warm.   Neurological:      Mental Status: He is alert.       Assessment:       1. Hydrocele, unspecified hydrocele type    2. Post-operative state    3. Benign prostatic hyperplasia, unspecified whether lower urinary tract symptoms present          Plan:       Paul was seen today for hydrocele.    Diagnoses and all orders for this visit:    Hydrocele, unspecified hydrocele type    Post-operative state    Benign prostatic hyperplasia, unspecified whether lower urinary tract symptoms present  -     Prostate Specific Antigen, Diagnostic; Future        Will check PSA and have him come back in 3 months.  He will do Sitz baths and apply Neosporin ointment b.i.d.

## 2022-11-29 NOTE — PROGRESS NOTES
Physical Therapy Daily Treatment Note       Date: 11/29/2022   Name: Paul Li Jr.  Clinic Number: [id number]    Therapy Diagnosis:   Encounter Diagnoses   Name Primary?    Lymphedema of right arm Yes    Impaired functional mobility and activity tolerance     Poor posture      Physician: Rosa Linn MD    Physician Orders: PT Eval and Treat -RUE lymphedema  Medical Diagnosis: Malignant neoplasm involving both nipple and areola of right breast in male, estrogen receptor negative [C50.021, Z17.1], Lymphedema of right upper extremity   Evaluation Date: 10/10/2022  Authorization Period Expiration: 10/14/22  Plan of Care Certification Period: 1/6/23 (12 weeks)  Visit # / Visits authorized: 7/ 11 (plus evaluation)  Insurance: [employer]  FOTO: 2/3     Time In: 10:08 AM  Time Out: 11:05 AM  Total Billable Time: 57 minutes     Precautions: Standard, Fall, cancer, and Spine, Bony Mets, hx of Seizures, L chest wall port      Subjective     Pt reports: No new complaints. He's been busy with traveling for the Thanksgiving holiday. Pt has been doing lymphatic drainage.  He was compliant with self manual lymph drainage  Response to previous treatment: his Right hand is smaller  Pain Scale: Paul rates pain on a scale of 0/10 on VAS.   Pain location: N/A    Fatigue: tired from full day at [place]  Functional change: none stated  Treatment:   Chemotherapy: te-adjuvant chemo therapy completed 10/19  Pt is currently on chemotherapy: pt is getting 1 infusion/week for 3 weeks with 1 week break, and he takes oral chemotherapy daily.  Radiation: Completed in February 2020  Surgery date: 12/17/19 pt underwent right axillary lymph node dissection and resection excess lateral tissue; 11/22/19 right simple mastectomy with SLNB; total of 18 lymph nodes removed      Objective     Objective Measures updated at progress report unless specified.     LANDMARK RIGHT UE LEFT  "UE DIFF RUE Diff RUE Diff RUE Diff RUE Diff RUE Diff   Date Eval Eval Eval 10/17/22 10/17/22 10/29/22 10/19/22 11/2/22 11/2/22 11/11/22 11/11/22 11/29/22 11/29/22   E + 8" 50 cm 46.5 cm 3.5 cm 48 cm -2 47.5 cm -0.5 50.5 cm +3 47.5 cm -3 49.5 cm +2   E + 6" 45.5 cm 44 cm 1.5 cm 45 cm -0.5 46.5 cm +1.5 47 cm +0.5 45 cm -2 45.5 cm +0.5   E + 4" 40 cm 40.5 cm 0.5 cm 40.5 cm +0.5 40.5cm No chg 41 cm +0.5 40.5 cm -0.5 40.5 cm No chg   E + 2" 38.5 cm 38.5 cm 0 cm 39 cm +0.5 40 cm +1 39.5 cm -0.5 38.5 cm -1 38 cm -0.5   Elbow 36.5 cm 34 cm 2.5 cm 35.5 cm -1 35.5 cm No chg 36 cm +0.5 34.5 cm -1.5 36 cm +1.5   W+ 8" 37.5cm 33 cm 4.5 cm 38cm +0.5 36.5 cm -1.5 37.5 cm +1 36.5 cm -1 37.5 cm +1   W +  6" 35 cm 29.5 cm 5.5 cm 34 cm -1 34.5 cm +0.5 33.5 cm -1 32.5 cm -1 33 cm +0.5   W + 4" 31.5 cm 25 cm 6.5 cm 31.5 cm No chg 31.5 cm No chg 29.5 cm -2 29 cm -0.5 30 cm +1   Wrist 23 cm 20 cm 3 cm 22.5cm -0.5 21.5 cm -1 23 cm +1.5 21 cm -2 24 cm +3   DPC 28.5 cm 26 cm 2.5 cm 28 cm -0.5 27.5cm -0.5 28.5 cm +1 27 cm -1.5 28 cm +1   IP Thumb 8.5 cm 8 cm 0.5 cm 8.5 cm No chg 8.5 cm No chg 9 cm +0.5 8.5 cm -0.5 8.5 cm No chg            Treatment     Paul received individual therapeutic exercises to improve postural correction and alignment, stretching and soft tissue mobility, and strengthening for 4 minutes including the following:     Diaphragmatic breathing, 2 x 5 reps  Scapular retractions, 10 reps  Shoulder rolls, 10 reps each fwd and back.    Paul received the following manual therapy techniques were performed to increased myofascial/soft tissue length, mobility and pliability, increase PROM, AROM and function as well as to decrease pain for 53 minutes    Measurements taken above    Short Neck- held due to history of seizures  Clear Healthy Lymph Nodes:  Left Axilla                                                  Right Groin  Open Anastomoses:  Anterior Axillo-Axillary  (AAA)                                    Axillo-Inguinal     " (AI)                                    Posterior Axillo-Axillary  (GEREMIAS) -not performed today        Right Upper Arm (Lateral and Medial)  Right  Antecubital Fossa  Right Dorsal Forearm  Right Volar Forearm  Dorsum Right Hand  Right fingers     Rework Right UE  Rework Anastomoses  Rework Healthy lymph Nodes    PT applies  gauze to right fingers and hand and stockinette, cotton artiflex, and short stretch bandages to pt's RIGHT/LEFT/BILATERAL: right upper extremity. PT added size H tubigrip over bandaging to assist keeping it in place. Pt educated to wear bandaging for next 2-3 days unless it causes pain, numbness, or changes in skin color/temperature, or if they start to fall down.  If removed, pt instructed to roll up bandages and cotton padding and bring to next session. If bandages are removed, pt can wear his tubigrip.  Pt has Vet glove to cover bandages in the shower, and we reviewed directions on how to care for bandages. Pt verbalizes understanding of all topics.                Home Exercise Program and Patient Education   Education provided re:  - role of PT in multi - disciplinary team, goals for PT  - progress towards goals         Written Home Exercises Provided: Patient instructed to cont prior HEP.  Exercises were reviewed and Paul was able to demonstrate them prior to the end of the session.  Paul demonstrated good  understanding of the education provided.     See EMR under Media for self lymphatic drainage provided prior visit.    Pt has no cultural, educational or language barriers to learning provided.    Assessment     Patient tolerated session fair. It has been increased time since last PT session due to pt travelling for holidays, so he presents with increased circumferential measurements.  Pt has been cleared by referring physician to receive complete decongestive therapy. He has been compliant with self manual lymph drainage and wearing compression bandages/tubigrip. He tolerated complete  decongestive therapy well.  Will gauge response at next session and progress as tolerated. Pt has met goals as noted below.      Pt prognosis is Good. Pt will continue to benefit from skilled outpatient physical therapy to address the deficits listed in the problem list chart on initial evaluation, provide pt/family education and to maximize pt's level of independence in the home and community environment.     Anticipated barriers to physical therapy: advanced disease    Goals as follows:  Short Term Goals (6 weeks)  1. Pt will demo decrease in girth in right upper extremity by >/= 2cm to allow for improved UE symmetry, clothing choice, ROM, and UE function. (progressing, not met)  2. Patient will demonstrate 100% knowledge of lymphedema precautions and signs of infection to allow for reduced risk of lymphedema, reduced risk of infection, and/or exacerbation.  (met)  3. Patient will perform self lymph drainage techniques to enhance lymphatic drainage and mobility.  ( met)  4. Patient will tolerate daily activities with multilayered bandaging to enhance lymphatic drainage and skin elasticity.  (met)  5. Pt will tolerate HEP for improved strength, functional mobility, ROM, posture, and endurance. (met)        Long Term Goals: ( 12  weeks)  1. Patient to show decreased girth in right upper extremity by >/= 3cm to allow for improved UE symmetry, clothing choice, ROM, and UE function.  (progressing, not met)  2. Patient will show reduction in density to mild or less with improved contour of limb to allow for improved cosmesis, improved lymphatic drainage, and functional mobility.  (progressing, not met)  3. Patient to bruna/doff compression garment with daily compliance to support lymphatic mobility and skin elasticity.  (progressing, not met)  4. Pt to show improved postural awareness and alignment for improved functional mobility.  (progressing, not met)  5. Pt to be independent and compliant with HEP to allow for  improved ROM, reach and carry with functional endurance and strength.    (progressing, not met)           Plan   Outpatient physical therapy 2x week for 12 weeks to include the following:   Manual Therapy, Neuromuscular Re-ed, Orthotic Management and Training, Patient Education, Self Care, Therapeutic Activities, and Therapeutic Exercise.     Plan of care Certification Period: 10/10/2022 to 1/6/23.       Therapist: Katelynn Harrell, PT  11/29/2022

## 2022-11-30 ENCOUNTER — INFUSION (OUTPATIENT)
Dept: INFUSION THERAPY | Facility: HOSPITAL | Age: 47
End: 2022-11-30
Attending: INTERNAL MEDICINE
Payer: MEDICARE

## 2022-11-30 ENCOUNTER — DOCUMENTATION ONLY (OUTPATIENT)
Dept: HEMATOLOGY/ONCOLOGY | Facility: CLINIC | Age: 47
End: 2022-11-30
Payer: MEDICARE

## 2022-11-30 ENCOUNTER — PATIENT MESSAGE (OUTPATIENT)
Dept: HEMATOLOGY/ONCOLOGY | Facility: CLINIC | Age: 47
End: 2022-11-30
Payer: MEDICARE

## 2022-11-30 VITALS
HEART RATE: 67 BPM | DIASTOLIC BLOOD PRESSURE: 59 MMHG | RESPIRATION RATE: 18 BRPM | OXYGEN SATURATION: 97 % | SYSTOLIC BLOOD PRESSURE: 120 MMHG | TEMPERATURE: 98 F

## 2022-11-30 DIAGNOSIS — C50.021 MALIGNANT NEOPLASM INVOLVING BOTH NIPPLE AND AREOLA OF RIGHT BREAST IN MALE, ESTROGEN RECEPTOR NEGATIVE: Primary | ICD-10-CM

## 2022-11-30 DIAGNOSIS — Z17.1 MALIGNANT NEOPLASM INVOLVING BOTH NIPPLE AND AREOLA OF RIGHT BREAST IN MALE, ESTROGEN RECEPTOR NEGATIVE: Primary | ICD-10-CM

## 2022-11-30 PROCEDURE — 25000003 PHARM REV CODE 250: Performed by: INTERNAL MEDICINE

## 2022-11-30 PROCEDURE — A4216 STERILE WATER/SALINE, 10 ML: HCPCS | Performed by: INTERNAL MEDICINE

## 2022-11-30 PROCEDURE — 63600175 PHARM REV CODE 636 W HCPCS: Mod: JG | Performed by: INTERNAL MEDICINE

## 2022-11-30 PROCEDURE — 96361 HYDRATE IV INFUSION ADD-ON: CPT

## 2022-11-30 PROCEDURE — 96360 HYDRATION IV INFUSION INIT: CPT

## 2022-11-30 PROCEDURE — 96413 CHEMO IV INFUSION 1 HR: CPT

## 2022-11-30 RX ORDER — SODIUM CHLORIDE 0.9 % (FLUSH) 0.9 %
10 SYRINGE (ML) INJECTION
Status: DISCONTINUED | OUTPATIENT
Start: 2022-11-30 | End: 2022-11-30 | Stop reason: HOSPADM

## 2022-11-30 RX ORDER — HEPARIN 100 UNIT/ML
500 SYRINGE INTRAVENOUS
Status: DISCONTINUED | OUTPATIENT
Start: 2022-11-30 | End: 2022-11-30 | Stop reason: HOSPADM

## 2022-11-30 RX ORDER — SODIUM CHLORIDE 9 MG/ML
INJECTION, SOLUTION INTRAVENOUS CONTINUOUS
Status: DISCONTINUED | OUTPATIENT
Start: 2022-11-30 | End: 2022-11-30 | Stop reason: HOSPADM

## 2022-11-30 RX ADMIN — SODIUM CHLORIDE: 0.9 INJECTION, SOLUTION INTRAVENOUS at 11:11

## 2022-11-30 RX ADMIN — PACLITAXEL 220 MG: 100 INJECTION, POWDER, LYOPHILIZED, FOR SUSPENSION INTRAVENOUS at 12:11

## 2022-11-30 RX ADMIN — HEPARIN 500 UNITS: 100 SYRINGE at 01:11

## 2022-11-30 RX ADMIN — Medication 10 ML: at 01:11

## 2022-11-30 NOTE — PLAN OF CARE
Pt arrived to unit for Day 8 of Abraxane. Labs done prior to and reviewed. Pt okay to proceed with tx today. Oriented to unit. Pt denies any significant complaints today. VSS. Abraxane infused over 30 minutes with additional IVFs given. Pt instructed to return next week for repeat labs and Day 15 of tx. Verbalized understanding. Discharged from unit in NAD.

## 2022-12-01 ENCOUNTER — PATIENT MESSAGE (OUTPATIENT)
Dept: REHABILITATION | Facility: HOSPITAL | Age: 47
End: 2022-12-01
Payer: MEDICARE

## 2022-12-02 ENCOUNTER — SPECIALTY PHARMACY (OUTPATIENT)
Dept: PHARMACY | Facility: CLINIC | Age: 47
End: 2022-12-02
Payer: MEDICARE

## 2022-12-02 DIAGNOSIS — C50.021 MALIGNANT NEOPLASM INVOLVING BOTH NIPPLE AND AREOLA OF RIGHT BREAST IN MALE, ESTROGEN RECEPTOR NEGATIVE: ICD-10-CM

## 2022-12-02 DIAGNOSIS — C79.51 BONE METASTASES: ICD-10-CM

## 2022-12-02 DIAGNOSIS — Z17.1 MALIGNANT NEOPLASM INVOLVING BOTH NIPPLE AND AREOLA OF RIGHT BREAST IN MALE, ESTROGEN RECEPTOR NEGATIVE: ICD-10-CM

## 2022-12-02 RX ORDER — ALPELISIB 150 MG/1
300 TABLET ORAL DAILY
Qty: 60 TABLET | Refills: 3 | Status: SHIPPED | OUTPATIENT
Start: 2022-12-02 | End: 2023-03-21

## 2022-12-05 ENCOUNTER — SPECIALTY PHARMACY (OUTPATIENT)
Dept: PHARMACY | Facility: CLINIC | Age: 47
End: 2022-12-05
Payer: MEDICARE

## 2022-12-05 ENCOUNTER — DOCUMENTATION ONLY (OUTPATIENT)
Dept: HEMATOLOGY/ONCOLOGY | Facility: CLINIC | Age: 47
End: 2022-12-05
Payer: MEDICARE

## 2022-12-05 ENCOUNTER — CLINICAL SUPPORT (OUTPATIENT)
Dept: REHABILITATION | Facility: HOSPITAL | Age: 47
End: 2022-12-05
Payer: MEDICARE

## 2022-12-05 DIAGNOSIS — R29.3 POOR POSTURE: ICD-10-CM

## 2022-12-05 DIAGNOSIS — I89.0 LYMPHEDEMA OF RIGHT ARM: Primary | ICD-10-CM

## 2022-12-05 DIAGNOSIS — Z74.09 IMPAIRED FUNCTIONAL MOBILITY AND ACTIVITY TOLERANCE: ICD-10-CM

## 2022-12-05 PROCEDURE — 97140 MANUAL THERAPY 1/> REGIONS: CPT

## 2022-12-05 PROCEDURE — 97110 THERAPEUTIC EXERCISES: CPT

## 2022-12-05 NOTE — TELEPHONE ENCOUNTER
Specialty Pharmacy - Refill Coordination    Specialty Medication Orders Linked to Encounter      Flowsheet Row Most Recent Value   Medication #1 alpelisib (PIQRAY) 300 mg/day (150 mg x 2) Tab (Order#[id number], Rx#[id number])            Refill Questions - Documented Responses      Flowsheet Row Most Recent Value   Patient Availability and HIPAA Verification    Does patient want to proceed with activity? Yes   HIPAA/medical authority confirmed? Yes   Relationship to patient of person spoken to? Self   Refill Screening Questions    Changes to allergies? No   Changes to medications? No   New conditions since last clinic visit? No   Unplanned office visit, urgent care, ED, or hospital admission in the last 4 weeks? No   How does patient/caregiver feel medication is working? Good   Financial problems or insurance changes? No   How many doses of your specialty medications were missed in the last 4 weeks? 0   Would patient like to speak to a pharmacist? No   When does the patient need to receive the medication? 12/08/22   Refill Delivery Questions    How will the patient receive the medication? Pickup   When does the patient need to receive the medication? 12/08/22   Shipping Address Home   Address in [facility] confirmed and updated if neccessary? Yes   Expected Copay ($) 0   Is the patient able to afford the medication copay? Yes   Payment Method zero copay   Days supply of Refill 30   Supplies needed? No supplies needed   Refill activity completed? Yes   Refill activity plan Refill scheduled   Shipment/Pickup Date: 12/05/22            Current Outpatient Medications   Medication Sig    alpelisib (PIQRAY) 300 mg/day (150 mg x 2) Tab Take 2 tablets (300 mg) by mouth once daily.    ALPRAZolam (XANAX) 0.5 MG tablet Take 1 tablet (0.5 mg total) by mouth 3 (three) times daily as needed for Insomnia or Anxiety.    amLODIPine (NORVASC) 10 MG tablet TAKE 1 TABLET (10 MG) BY MOUTH EVERY DAY    calcium-vitamin D3 (OYSTER  SHELL CALCIUM-VIT D3) 500 mg-5 mcg (200 unit) per tablet Take 1 tablet by mouth 2 (two) times daily.    diazePAM (VALIUM) 5 MG tablet Take 1 tablet (5 mg total) by mouth every 12 (twelve) hours as needed (muscle spasm).    diphenoxylate-atropine 2.5-0.025 mg (LOMOTIL) 2.5-0.025 mg per tablet Take 1 tablet by mouth 4 (four) times daily as needed for Diarrhea.    dulaglutide (TRULICITY) 1.5 mg/0.5 mL pen injector Inject 1.5 mg into the skin once a week.    FLUoxetine 20 MG capsule Take 2 capsules (40 mg total) by mouth once daily.    FLUoxetine 40 MG capsule Take 2 capsules (80 mg total) by mouth once daily.    gabapentin (NEURONTIN) 300 MG capsule Take 1 capsule (300 mg total) by mouth 3 (three) times daily.    hydroCHLOROthiazide (HYDRODIURIL) 25 MG tablet TAKE 1 TABLET BY MOUTH ONCE DAILY    HYDROcodone-acetaminophen (NORCO)  mg per tablet Take 1 tablet by mouth every 6 (six) hours as needed for Pain.    ibuprofen (ADVIL,MOTRIN) 600 MG tablet Take 1 tablet (600 mg total) by mouth every 8 (eight) hours as needed for Pain.    insulin detemir U-100 (LEVEMIR FLEXTOUCH U-100 INSULN) 100 unit/mL (3 mL) InPn pen Inject 21 Units into the skin every evening.    insulin lispro (HUMALOG KWIKPEN INSULIN) 100 unit/mL pen Inject 5 Units into the skin 3 (three) times daily.    lancets 33 gauge Misc 1 lancet by Misc.(Non-Drug; Combo Route) route once daily.    lancing device Misc 1 Device by Misc.(Non-Drug; Combo Route) route 2 (two) times daily with meals.    LIDOcaine-prilocaine (EMLA) cream Apply topically as needed.    losartan (COZAAR) 50 MG tablet Take 2 tablets by mouth once daily    metFORMIN (GLUCOPHAGE) 500 MG tablet Take 2 tablets (1,000 mg total) by mouth 2 (two) times daily with meals. Needs appointment for future refills    oxyCODONE (ROXICODONE) 5 MG immediate release tablet Take 1 tablet (5 mg total) by mouth every 4 (four) hours as needed for Pain.    pen needle, diabetic (BD ULTRA-FINE TANESHA PEN NEEDLE) 32  "gauge x 5/32" Ndle Use as directed with novolog and levemir    tadalafiL (CIALIS) 5 MG tablet Take 2 tablets (10 mg total) by mouth daily as needed for Erectile Dysfunction.    traZODone (DESYREL) 50 MG tablet Take 1 tablet (50 mg total) by mouth every evening.   Last reviewed on 11/30/2022 12:16 PM by Berkley Ruiz RN    Review of patient's allergies indicates:  No Known Allergies Last reviewed on  11/30/2022 11:54 AM by Berkley Ruiz      Tasks added this encounter   12/31/2022 - Refill Call (Auto Added)  12/6/2022 - Pickup Reminder   Tasks due within next 3 months   No tasks due.     Sera Mckeon, PharmD  [facility]  [address] [place] [id number]  Phone: [phone]  Fax: [phone]        "

## 2022-12-05 NOTE — PROGRESS NOTES
Physical Therapy Daily Treatment Note       Date: 12/5/2022   Name: Paul Li Jr.  Clinic Number: [id number]    Therapy Diagnosis:   Encounter Diagnoses   Name Primary?    Lymphedema of right arm Yes    Impaired functional mobility and activity tolerance     Poor posture      Physician: Rosa Linn MD    Physician Orders: PT Eval and Treat -RUE lymphedema  Medical Diagnosis: Malignant neoplasm involving both nipple and areola of right breast in male, estrogen receptor negative [C50.021, Z17.1], Lymphedema of right upper extremity   Evaluation Date: 10/10/2022  Authorization Period Expiration: 10/14/22  Plan of Care Certification Period: 1/6/23 (12 weeks)  Visit # / Visits authorized: 8/ 11 (plus evaluation)  Insurance: [employer]  FOTO: 2/3     Time In: 2:05 PM  Time Out: 3:14 PM  Total Billable Time: 69 minutes     Precautions: Standard, Fall, cancer, and Spine, Bony Mets, hx of Seizures, L chest wall port      Subjective     Pt reports: Wore compression bandages since last visit  He was compliant with self manual lymph drainage  Response to previous treatment: his Right hand is smaller  Pain Scale: Paul rates pain on a scale of 0/10 on VAS.   Pain location: N/A    Fatigue: tired from full day at [facility]  Functional change: none stated  Treatment:   Chemotherapy: te-adjuvant chemo therapy completed 10/19  Pt is currently on chemotherapy: pt is getting 1 infusion/week for 3 weeks with 1 week break, and he takes oral chemotherapy daily.  Radiation: Completed in February 2020  Surgery date: 12/17/19 pt underwent right axillary lymph node dissection and resection excess lateral tissue; 11/22/19 right simple mastectomy with SLNB; total of 18 lymph nodes removed      Objective     Objective Measures updated at progress report unless specified.     LANDMARK RIGHT UE LEFT UE DIFF RUE Diff RUE Diff RUE Diff RUE Diff RUE Diff RUE Diff   Date Eval  "Eval Eval 10/17/22 10/17/22 10/29/22 10/19/22 11/2/22 11/2/22 11/11/22 11/11/22 11/29/22 11/29/22 12/5/22 12/5/22   E + 8" 50 cm 46.5 cm 3.5 cm 48 cm -2 47.5 cm -0.5 50.5 cm +3 47.5 cm -3 49.5 cm +2 50.5 cm +1   E + 6" 45.5 cm 44 cm 1.5 cm 45 cm -0.5 46.5 cm +1.5 47 cm +0.5 45 cm -2 45.5 cm +0.5 46 cm +0.5   E + 4" 40 cm 40.5 cm 0.5 cm 40.5 cm +0.5 40.5cm No chg 41 cm +0.5 40.5 cm -0.5 40.5 cm No chg 40 cm -0.5   E + 2" 38.5 cm 38.5 cm 0 cm 39 cm +0.5 40 cm +1 39.5 cm -0.5 38.5 cm -1 38 cm -0.5 39 cm +1   Elbow 36.5 cm 34 cm 2.5 cm 35.5 cm -1 35.5 cm No chg 36 cm +0.5 34.5 cm -1.5 36 cm +1.5 36 cm No chg   W+ 8" 37.5cm 33 cm 4.5 cm 38cm +0.5 36.5 cm -1.5 37.5 cm +1 36.5 cm -1 37.5 cm +1 37.5 cm No chg   W +  6" 35 cm 29.5 cm 5.5 cm 34 cm -1 34.5 cm +0.5 33.5 cm -1 32.5 cm -1 33 cm +0.5 33 cm No chg   W + 4" 31.5 cm 25 cm 6.5 cm 31.5 cm No chg 31.5 cm No chg 29.5 cm -2 29 cm -0.5 30 cm +1 28 cm -2   Wrist 23 cm 20 cm 3 cm 22.5cm -0.5 21.5 cm -1 23 cm +1.5 21 cm -2 24 cm +3 22 cm -2   DPC 28.5 cm 26 cm 2.5 cm 28 cm -0.5 27.5cm -0.5 28.5 cm +1 27 cm -1.5 28 cm +1 27 cm -1   IP Thumb 8.5 cm 8 cm 0.5 cm 8.5 cm No chg 8.5 cm No chg 9 cm +0.5 8.5 cm -0.5 8.5 cm No chg 9 cm +0.5            Treatment     Paul received individual therapeutic exercises to improve postural correction and alignment, stretching and soft tissue mobility, and strengthening for 10 minutes including the following:     Reviewed exercising parameters considering spine and lymphedema precautions. Start low and progress slow. Review spine precautions and how they affect exercise selection    Diaphragmatic breathing, 2 x 5 reps  Scapular retractions  x 10 reps  Shoulder rolls, 10 reps each fwd and back      Paul received the following manual therapy techniques were performed to increased myofascial/soft tissue length, mobility and pliability, increase PROM, AROM and function as well as to decrease pain for 59 minutes    Measurements taken above    Short " Neck- held due to history of seizures  Clear Healthy Lymph Nodes:  Left Axilla                                                  Right Groin  Open Anastomoses:  Anterior Axillo-Axillary  (AAA)                                    Axillo-Inguinal     (AI)                                    Posterior Axillo-Axillary  (GEREMIAS)       Right Upper Arm (Lateral and Medial)  Right  Antecubital Fossa  Right Dorsal Forearm  Right Volar Forearm  Dorsum Right Hand  Right fingers     Rework Right UE  Rework Anastomoses  Rework Healthy lymph Nodes    PT applies  gauze to right fingers and hand and stockinette, cotton artiflex, and short stretch bandages to pt's RIGHT/LEFT/BILATERAL: right upper extremity. PT added size H tubigrip over bandaging to assist keeping it in place. Pt educated to wear bandaging for next 2-3 days unless it causes pain, numbness, or changes in skin color/temperature, or if they start to fall down.  If removed, pt instructed to roll up bandages and cotton padding and bring to next session. If bandages are removed, pt can wear his tubigrip.  Pt has Vet glove to cover bandages in the shower, and we reviewed directions on how to care for bandages. Pt verbalizes understanding of all topics.                Home Exercise Program and Patient Education   Education provided re:  - role of PT in multi - disciplinary team, goals for PT  - progress towards goals         Written Home Exercises Provided: Patient instructed to cont prior HEP.  Exercises were reviewed and Paul was able to demonstrate them prior to the end of the session.  Paul demonstrated good  understanding of the education provided.     See EMR under Media for self lymphatic drainage provided prior visit.    Pt has no cultural, educational or language barriers to learning provided.    Assessment     Patient tolerated session well. He needs reminders to remove bandages after 4-5 days. Overall he presents with reductions.  Pt has been cleared by referring  physician to receive complete decongestive therapy. He has been compliant with self manual lymph drainage and wearing compression bandages/tubigrip. He tolerated complete decongestive therapy well.  Will gauge response at next session and progress as tolerated. Once his measurements stabilize, he will benefit from a compression sleeve, and he will also benefit from a compression pump to manage his symptoms.      Pt prognosis is Good. Pt will continue to benefit from skilled outpatient physical therapy to address the deficits listed in the problem list chart on initial evaluation, provide pt/family education and to maximize pt's level of independence in the home and community environment.     Anticipated barriers to physical therapy: advanced disease    Goals as follows:  Short Term Goals (6 weeks)  1. Pt will demo decrease in girth in right upper extremity by >/= 2cm to allow for improved UE symmetry, clothing choice, ROM, and UE function. (progressing, not met)  2. Patient will demonstrate 100% knowledge of lymphedema precautions and signs of infection to allow for reduced risk of lymphedema, reduced risk of infection, and/or exacerbation.  (met)  3. Patient will perform self lymph drainage techniques to enhance lymphatic drainage and mobility.  ( met)  4. Patient will tolerate daily activities with multilayered bandaging to enhance lymphatic drainage and skin elasticity.  (met)  5. Pt will tolerate HEP for improved strength, functional mobility, ROM, posture, and endurance. (met)        Long Term Goals: ( 12  weeks)  1. Patient to show decreased girth in right upper extremity by >/= 3cm to allow for improved UE symmetry, clothing choice, ROM, and UE function.  (progressing, not met)  2. Patient will show reduction in density to mild or less with improved contour of limb to allow for improved cosmesis, improved lymphatic drainage, and functional mobility.  (progressing, not met)  3. Patient to bruna/doff compression  garment with daily compliance to support lymphatic mobility and skin elasticity.  (progressing, not met)  4. Pt to show improved postural awareness and alignment for improved functional mobility.  (progressing, not met)  5. Pt to be independent and compliant with HEP to allow for improved ROM, reach and carry with functional endurance and strength.    (progressing, not met)           Plan   Outpatient physical therapy 2x week for 12 weeks to include the following:   Manual Therapy, Neuromuscular Re-ed, Orthotic Management and Training, Patient Education, Self Care, Therapeutic Activities, and Therapeutic Exercise.     Plan of care Certification Period: 10/10/2022 to 1/6/23.       Therapist: Katelynn Harrell, PT  12/5/2022

## 2022-12-05 NOTE — PROGRESS NOTES
Lorelei Coles LCSW, notified [facility] that patient will need transportation for 12/6. SW followed up and spoke to patient who confirmed need for ride but on 12/7.    SW arranged 9a pickup for patient with [employer] on 12/7.

## 2022-12-06 ENCOUNTER — PATIENT MESSAGE (OUTPATIENT)
Dept: PHARMACY | Facility: CLINIC | Age: 47
End: 2022-12-06
Payer: MEDICARE

## 2022-12-06 NOTE — PROGRESS NOTES
Subjective:       Patient ID: Paul Li Jr. is a 47 y.o. male.    Chief Complaint: Malignant neoplasm involving both nipple and areola of righ      HPI Returns for follow up for chemotherapy C16D1(Abraxane and PO Aplelisib).  His son has a bowel game next week. And he is going to the [facility] [employer] game in [facility] in [place], patient would like to attend both.     Overall he is feeling well.  Appetite and bowel movements are good. Weight stable.  He denies fevers and chills.     He went to group therapy that helped substantially.        Per Dr. Linn's previous notes: Most recent imaging-   11/4/22: PET scan:  FINDINGS:  Quality of the study: Adequate.     In the head and neck, there are no hypermetabolic lesions worrisome for malignancy. There are no hypermetabolic mucosal lesions, and there are no pathologically enlarged or hypermetabolic lymph nodes.     In the chest, there are no hypermetabolic lesions worrisome for malignancy.  There are no concerning pulmonary nodules or masses, and there are no pathologically enlarged or hypermetabolic lymph nodes.  Postoperative changes of right mastectomy and axillary derick dissection.  Stable subcentimeter right lower lobe solid nodule.     In the abdomen and pelvis, there is physiologic tracer distribution within the abdominal organs and excretion into the genitourinary system.  Sigmoid diverticulosis.     In the bones, sclerotic lesion in T7 demonstrates uptake, mildly increased prior exam with SUV max 5.2 (previously 4.4).  T12 sclerotic focus demonstrates an SUV max of 4.8 (previously 5.8).  Small sclerotic focus involving the L1 vertebral body which appears mildly increased in size with prominent focal uptake SUV max 6.8.  New focal activity in a non sclerotic portion of the right sacral ala on image 201 with an SUV of 5.8 as well as associated with subtle new sclerosis of the right side of the L5 vertebral body with an SUV of 8.7.     Extensive sclerotic change to the  iliac wings, acetabula, sacrum, and pubic rami which appears grossly stable compared to the prior exam. Increased tracer uptake throughout the pelvis, however there also does appear to be diffusely increased uptake compared to the prior exam throughout the bone marrow which may indicate bone marrow hyperplasia.     In the extremities, there are no hypermetabolic lesions worrisome for malignancy.     Impression:     In this patient with breast cancer, there are postoperative changes of right mastectomy and axillary derick dissection.  No evidence of residual/recurrent disease.     New multifocal uptake in the axial and appendicular skeleton against a background of probable bone marrow hyperplasia.  Some of the foci are associated with sclerosis and others have no CT correlates.  Differential considerations include active osteoblastic metastasis, osteoblastic response/healing of prior metastasis, and/or new, early CT-occult metastasis.  Tissue sampling would be required for definitive diagnosis.       - 6/6/2022 PET scan:  FINDINGS:  Quality of the study: Adequate.  In the head and neck, there is nonspecific focal uptake in the right infratemporal fossa without CT correlate which may reflect atypically focal muscular uptake.  There are no other hypermetabolic lesions worrisome for malignancy. There are no hypermetabolic mucosal lesions, and there are no pathologically enlarged or hypermetabolic lymph nodes.  In the chest, there are no hypermetabolic lesions worrisome for malignancy.  No pathologically enlarged or hypermetabolic lymph nodes.  Stable subcentimeter right pulmonary nodules too small to characterize on PET (images 84 and 85). Postsurgical changes of right mastectomy and axillary node dissection.  In the abdomen and pelvis, there is physiologic tracer distribution within the abdominal organs and excretion into the genitourinary system.  Diffuse sigmoid mild uptake, likely related to metformin use or  physiologic activity.  There is sigmoid diverticulosis without CT evidence of diverticulitis.  In the bones, there are hypermetabolic lesions at the T12 and T7 vertebral bodies.  Uptake at the T12 vertebral body measures 5.8 SUV max corresponding to a region of prior sclerosis and T7 with an SUV of 4.4 within the body away from focal sclerosis on the right.  Other sclerotic lesions demonstrate uptake similar to normal marrow activity.  In the extremities, there are no hypermetabolic lesions worrisome for malignancy.  Additional CT findings: Bilateral inguinal hernias.  Large left hydrocele.  Impression:  Interval increased uptake in the T7 body away from sclerosis and T12 body involving a region of prior sclerosis.  Differential considerations for T7 include focal nodular hyperplasia of marrow versus early, CT occult metastasis, and differential considerations for T12 include active osteoblastic metastasis versus healing metastasis.  Other sclerotic lesions demonstrate uptake similar to normal marrow suggesting response to therapy.  Nonspecific focal uptake at the right temporal fossa without CT correlate may indicate atypically focal muscular uptake.  Attention on follow-up     Diagnosis:  Metastatic TNBC progressed (prior Stage IB ([id number]) triple negative invasive ductal carcinoma with lobular features of right breast, retroareolar, Grade 2, Ki67 80%, diagnosed 2019)     Oncology History:  Metastatic  Set up for scans (due to back pain) which are consistent for recurrence.   Imagin/3/2021 MRI T/L spine:  FINDINGS:  Alignment: Within normal limits.  Vertebrae: Low T1 signal intensity in the left T12 vertebral body extending to the left pedicle, S1 and S2 vertebral bodies with abnormal enhancement.  The vertebral heights are well maintained.  No evidence of acute fractures.  Abnormal appearance of the LEFT sacrum and ilium as well.  Discs: Degenerative disease of the level of L4-L5 with posterior annular  fissure.  Conus appears within normal limits terminating at the level of the L2. level.  Cauda equina appears unremarkable.  Mild circumferential disc bulging at the level of T10-T11 abutting the ventral thecal sac.  No significant spinal canal stenosis or neural foraminal narrowing throughout the thoracic spine.  No significant spinal canal stenosis or neural foraminal narrowing throughout the lumbar spine.  Paraspinous muscles and soft tissues: Subcentimeter focal enhancement in the posterior column along the supraspinous ligament at the level of L1-L2 (sagittal series 21, image 10) potentially inflammatory versus neoplastic.  Impression:  Abnormal appearance of the T12, S1, S2 vertebral bodies as well as LEFT sacrum and ilium concerning for metastatic disease in this patient with history of breast cancer.  No evidence for significant central canal narrowing.  Additional findings, as above.  This report was flagged in Epic as abnormal.     6/9/2021 PET scan:  COMPARISON:  MRI thoracic and lumbar spine 06/03/2021  FINDINGS:  Quality of the study: Adequate.  In the head and neck, there are no hypermetabolic lesions worrisome for malignancy. There are no hypermetabolic mucosal lesions, and there are no pathologically enlarged or hypermetabolic lymph nodes.  In the chest, there is postoperative change of right mastectomy/right axillary lymph node dissection.  There is low level hypermetabolic activity with mild soft tissue thickening in the skin/superficial subcutaneous fat of the right chest wall (axial fused image 78) with SUV max 3.6.  There is soft tissue thickening measuring approximately 1.8 x 7.9 cm in the subcutaneous fat of the right chest wall (axial series 3, image 84) with SUV max 3.1.  There are a few bilateral pulmonary nodules measuring up to 0.3 cm in the left lung (axial series 3, image 88) and 0.5 cm in the right lung (axial series 3, image 84).  These nodules are too small to characterize by PET.   There are no pathologically enlarged or hypermetabolic lymph nodes.  In the abdomen and pelvis, there is physiologic tracer distribution within the abdominal organs and excretion into the genitourinary system.  Subtle sen mesentery and multiple prominent mesenteric lymph nodes measuring up to 1.8 cm in long axis with background activity.  In the bones, there are several hypermetabolic lesions worrisome for malignancy.  Sclerotic lesion in the left T12 vertebral body (axial series 3, image 131) with SUV max 12.  Sclerotic lesions in the sacrum (for example axial series 3, image 188) with SUV max 9.7.  Sclerotic lesions in the left iliac bone (for example axial series 3, image 205) with SUV max 6.  In the extremities, there are no hypermetabolic lesions worrisome for malignancy.  Incidental findings:  Bilateral maxillary antra mucous retention cysts.  Fat containing right inguinal hernia.  Impression:  1. In this patient with right breast carcinoma status post mastectomy/right axillary lymph node dissection, there are multiple sclerotic lesions in the T12 vertebral body, sacrum, and left iliac bone with hypermetabolic activity concerning for active metastatic disease and in keeping with findings on MRI 06/03/2021.  2. Additionally there is low-level hypermetabolic activity with soft tissue thickening in the right chest wall as detailed above.  These findings are nonspecific and may be related to postoperative/post radiation change, with recurrent malignancy not excluded.  3. Nonspecific mesenteric haziness and lymph nodes which may indicate mesenteric adenitis.  Recommend clinical correlation and attention on follow-up.  4. Additional findings as above.  I, Sohan Tillman MD, attest that I reviewed and interpreted the images.     In summary,   Started aplelisib 8/22/2021   Abraxane C1 started 8/13/2021  We had sent Guardant and discussed results with Molecular tumor board  He also had a repeat bx to confirm metastatic  triple negative breast cancer  Plan:   Nab-Paclitaxel and Alpelisib  Reference: https://ascopubs.org/doi/abs/10.1200/JCO.[email or web address]_suppl.1018  Also on Zometa-      - C1D1 Abraxane 8/13/2021  - Started aplelisib 8/22/2021               Oncology History   Malignant neoplasm involving both nipple and areola of right breast in male, estrogen receptor negative   5/1/2019 Imaging Significant Findings     Mammogram and US  Impression:  Right  Mass: Right breast 14 mm mass at the retroareolar anterior position. Assessment: 4 - Suspicious finding. Biopsy is recommended.   Left  There is no mammographic or sonographic evidence of malignancy.  Recommendation:  Biopsy is recommended.      5/2/2019 Biopsy     BREAST, RIGHT, RETROAREOLAR MASS, ULTRASOUND-GUIDED BIOPSY:  Invasive ductal carcinoma with lobular features.  Size of invasive carcinoma: 5 MM in greatest linear dimension within a single      5/2/2019 Breast Tumor Markers     Estrogen: Negative  Progesterone: Negative  HER2: Negative  Ki67: 80      5/17/2019 Cancer Staged     Staging form: Breast, AJCC 8th Edition  - Clinical stage from 5/17/2019: Stage IB (cT1c, cN0, cM0, G2, ER-, VA-, HER2-) - Signed by Richa Bahena MD on 5/17/2019 5/27/2019 - 7/21/2019 Chemotherapy     Treatment Summary   Plan Name: OP BREAST DOSE-DENSE AC - DOXORUBICIN CYCLOPHOSPHAMIDE Q2W  Treatment Goal: Curative  Status: Inactive  Start Date: 5/27/2019  End Date: 7/9/2019  Provider: Richa Bahena MD  Chemotherapy: DOXOrubicin chemo injection 186 mg, 60 mg/m2 = 186 mg, Intravenous, Clinic/HOD 1 time, 4 of 4 cycles  Administration: 186 mg (5/27/2019), 186 mg (6/10/2019), 186 mg (6/24/2019), 186 mg (7/8/2019)  cyclophosphamide (CYTOXAN) 600 mg/m2 = 1,865 mg in sodium chloride 0.9% 250 mL chemo infusion, 600 mg/m2 = 1,865 mg, Intravenous, Clinic/HOD 1 time, 4 of 4 cycles  Administration: 1,865 mg (5/27/2019), 1,865 mg (6/10/2019), 1,865 mg (6/24/2019), 1,865 mg (7/8/2019)      7/22/2019  - 10/15/2019 Chemotherapy     Treatment Summary   Plan Name: OP PACLITAXEL QW  Treatment Goal: Curative  Status: Inactive  Start Date: 7/24/2019  End Date: 10/8/2019  Provider: Richa Bahena MD  Chemotherapy: PACLitaxel (TAXOL) 80 mg/m2 = 246 mg in sodium chloride 0.9% 250 mL chemo infusion, 80 mg/m2 = 246 mg, Intravenous, Clinic/HOD 1 time, 12 of 12 cycles  Dose modification: 60 mg/m2 (original dose 80 mg/m2, Cycle 3), 55 mg/m2 (original dose 80 mg/m2, Cycle 7), 60 mg/m2 (original dose 80 mg/m2, Cycle 7), 50 mg/m2 (original dose 80 mg/m2, Cycle 9), 50 mg/m2 (original dose 80 mg/m2, Cycle 10)  Administration: 246 mg (7/24/2019), 246 mg (7/31/2019), 186 mg (8/7/2019), 186 mg (8/14/2019), 186 mg (8/21/2019), 186 mg (8/28/2019), 186 mg (9/4/2019), 174 mg (9/11/2019), 156 mg (9/18/2019), 156 mg (9/25/2019), 156 mg (10/1/2019), 156 mg (10/8/2019)      11/22/2019 Breast Surgery     On 11/22/19 Dr whyte performed a right breast mastectomy with SLN biopsy with pathology showing grade 2, 6 mm IDC with extensive treatment effect, no LVI with 1 LN positive 4 mm, negative margins. The on 12/17/19, Dr Whyte performed right axillary dissection with pathology still pending.      11/22/2019 Cancer Staged     ypT1b N1      12/17/2019 Breast Surgery     Surgery: Dr Shima Whyte, [phone] performed right ALND with pathology showing 14 negative lymph nodes.             1/14/2020 Tumor Conference      Post mastectomy radiation therapy: Xeloda, then possible Keytruda trial .      2/3/2020 - 3/23/2020 Radiation Therapy     Treating physician: Speedy Nair MD   Total Dose: 50.4 Gy to the chest wall and regional lymphatics  10 Gy boost to the prostate.   Fractions: 28 fractions at 1.8 Gy per fraction  5 fractions at 2 Gy per fraction       2/28/2020 -  Chemotherapy     * 4/15/2020 - 9/28/20 completed 6 cycles adjuvant Xeloda 1000 mg/m2 bid days 1-14 every 21 days  Treatment Summary   Plan Name: OP CAPECITABINE 1250MG/M2 BID  Q3W  Treatment Goal: Curative  Status: Active  Start Date: 3/20/2020  End Date: 3/20/2020  Provider: Richa Bahena MD  Chemotherapy: [No matching medication found in this treatment plan]                Review of Systems   Constitutional:  Negative for activity change, appetite change, chills, fatigue, fever and unexpected weight change.   HENT:  Negative for dental problem, postnasal drip, rhinorrhea, sinus pressure/congestion, sore throat, trouble swallowing and voice change.    Eyes:  Negative for visual disturbance.   Respiratory:  Negative for cough, shortness of breath and wheezing.    Cardiovascular:  Negative for chest pain, palpitations and leg swelling.   Gastrointestinal:  Negative for abdominal distention, abdominal pain, blood in stool, change in bowel habit, constipation, diarrhea, nausea, vomiting and change in bowel habit.   Endocrine: Negative for cold intolerance and heat intolerance.   Genitourinary:  Negative for decreased urine volume, difficulty urinating, dysuria, frequency, hematuria and urgency.   Musculoskeletal:  Negative for arthralgias, back pain, gait problem, joint swelling, myalgias, neck pain and neck stiffness.        Lymphedema right arm   Integumentary:  Negative for color change, pallor, rash and wound.        Right arm lymphedema    Neurological:  Positive for numbness (improved neuropathy; right hand fingers). Negative for dizziness, weakness, light-headedness and headaches.   Hematological:  Negative for adenopathy. Does not bruise/bleed easily.   Psychiatric/Behavioral:  Negative for dysphoric mood and sleep disturbance. The patient is not nervous/anxious.        Objective:      Physical Exam  Vitals and nursing note reviewed.   Constitutional:       General: He is not in acute distress.     Appearance: Normal appearance. He is well-developed. He is obese. He is not diaphoretic.      Comments: Presents alone  Very pleasant  ECOG= 0   HENT:      Head: Normocephalic and  atraumatic.   Eyes:      General: No scleral icterus.     Extraocular Movements: Extraocular movements intact.      Conjunctiva/sclera: Conjunctivae normal.      Pupils: Pupils are equal, round, and reactive to light.   Neck:      Thyroid: No thyromegaly.      Trachea: No tracheal deviation.   Cardiovascular:      Rate and Rhythm: Normal rate and regular rhythm.      Heart sounds: Normal heart sounds. No murmur heard.    No friction rub. No gallop.   Pulmonary:      Effort: Pulmonary effort is normal. No respiratory distress.      Breath sounds: Normal breath sounds. No wheezing, rhonchi or rales.   Chest:      Chest wall: No tenderness.   Abdominal:      General: Bowel sounds are normal. There is no distension.      Palpations: Abdomen is soft. There is no mass.      Tenderness: There is no abdominal tenderness. There is no guarding or rebound.      Comments: No organomegaly   Musculoskeletal:         General: Swelling (chronic RUE lymphedema) present. No tenderness or deformity. Normal range of motion.      Cervical back: Normal range of motion and neck supple. No rigidity or tenderness.      Right lower leg: No edema.      Left lower leg: No edema.   Lymphadenopathy:      Cervical: No cervical adenopathy.   Skin:     General: Skin is warm and dry.      Coloration: Skin is not jaundiced or pale.      Findings: No erythema or rash.   Neurological:      General: No focal deficit present.      Mental Status: He is alert and oriented to person, place, and time. Mental status is at baseline.      Cranial Nerves: No cranial nerve deficit.      Sensory: No sensory deficit.      Motor: No weakness or abnormal muscle tone.      Coordination: Coordination normal.      Gait: Gait normal.      Deep Tendon Reflexes: Reflexes are normal and symmetric. Reflexes normal.   Psychiatric:         Mood and Affect: Mood normal.         Behavior: Behavior normal.         Thought Content: Thought content normal.         Judgment:  Judgment normal.       Labs- reviewed  Assessment:       1. Malignant neoplasm involving both nipple and areola of right breast in male, estrogen receptor negative        2. Bone metastases        3. Chemotherapy-induced neutropenia        4. Essential hypertension        5. Uncontrolled type 2 diabetes mellitus with hyperglycemia        6. Type 2 diabetes mellitus without complication, without long-term current use of insulin        7. Class 3 severe obesity due to excess calories with serious comorbidity and body mass index (BMI) of 50.0 to 59.9 in adult               Plan:     T1-2.  - Proceed with Cycle 16 Day 1 of abraxane ane Piqray   - Zometa given today    3-4. Resolved on labs today     5. Monitored today; continue current medication and follow up with PCP     6. Diet and exercise    7-8. Monitored glucose today; continue current medications and follow up with endocrinology       Return to clinic in 4 weeks with MD appointment and labs.     Patient is in agreement with the proposed treatment plan. All questions were answered to the patient's satisfaction. Patient knows to call clinic for any new or worsening symptoms and if anything is needed before the next clinic visit.          Michelle Grayson, KARIP-C  Hematology & Medical Oncology   [address] [place] [id number]  ph. [phone]  Fax. [phone]    Collaborating physician, Dr. Linn.    Approximately 20 minutes were spent face-to-face with the patient.  Approximately 30 minutes in total were spent on this encounter, which includes face-to-face time and non-face-to-face time preparing to see the patient (e.g., review of tests), obtaining and/or reviewing separately obtained history, documenting clinical information in the electronic or other health record, independently interpreting results (not separately reported) and communicating results to the patient/family/caregiver, or care coordination (not separately reported).       Route  Chart for Scheduling    Med Onc Chart Routing  Urgent    Follow up with physician 4 weeks. see Dr. Linn week of MLK day   Follow up with [facility]    Infusion scheduling note abraxane 12/30 (he is on the wait list) and 1/5, after seeing Dr. Linn in [place] needs abraxane days 1, 8, and 15 of a chemo cycle   Injection scheduling note    Labs CBC and CMP   Lab interval: once a week     Imaging    Pharmacy appointment    Other referrals        Treatment Plan Information   OP BREAST NAB-PACLITAXEL D1, D8, D15 + ALPELISIB    Rosa Linn MD   Upcoming Treatment Dates - OP BREAST NAB-PACLITAXEL D1, D8, D15 + ALPELISIB     12/27/2022       Chemotherapy       PACLitaxeL protein-bound (ABRAXANE) 80 mg/m2 = 220 mg in 44 mL infusion  1/3/2023       Chemotherapy       PACLitaxeL protein-bound (ABRAXANE) 80 mg/m2 = 220 mg in 44 mL infusion    Supportive Plan Information  OP FILGRASTIM 480 MCG   Michelle Grayson NP   Upcoming Treatment Dates - OP FILGRASTIM 480 MCG    No upcoming days in selected categories.    Therapy Plan Information  PORT FLUSH  Flushes  heparin, porcine (PF) 100 unit/mL injection flush 500 Units  500 Units, Intravenous, Every visit  sodium chloride 0.9% flush 10 mL  10 mL, Intravenous, Every visit    ZOLEDRONIC ACID (ZOMETA) IV  Medications  zoledronic 4 mg/100 mL infusion 4 mg  4 mg, Intravenous, Every 4 weeks  Flushes  sodium chloride 0.9% 250 mL flush bag  Intravenous, Every 4 weeks  sodium chloride 0.9% flush 10 mL  10 mL, Intravenous, Every 4 weeks  heparin, porcine (PF) 100 unit/mL injection flush 500 Units  500 Units, Intravenous, Every 4 weeks  alteplase injection 2 mg  2 mg, Intra-Catheter, Every 4 weeks

## 2022-12-07 ENCOUNTER — INFUSION (OUTPATIENT)
Dept: INFUSION THERAPY | Facility: HOSPITAL | Age: 47
End: 2022-12-07
Attending: INTERNAL MEDICINE
Payer: MEDICARE

## 2022-12-07 ENCOUNTER — TELEPHONE (OUTPATIENT)
Dept: PSYCHIATRY | Facility: CLINIC | Age: 47
End: 2022-12-07
Payer: MEDICARE

## 2022-12-07 VITALS
OXYGEN SATURATION: 97 % | TEMPERATURE: 98 F | RESPIRATION RATE: 17 BRPM | DIASTOLIC BLOOD PRESSURE: 56 MMHG | HEART RATE: 61 BPM | SYSTOLIC BLOOD PRESSURE: 116 MMHG

## 2022-12-07 DIAGNOSIS — Z17.1 MALIGNANT NEOPLASM INVOLVING BOTH NIPPLE AND AREOLA OF RIGHT BREAST IN MALE, ESTROGEN RECEPTOR NEGATIVE: ICD-10-CM

## 2022-12-07 DIAGNOSIS — G89.3 NEOPLASM RELATED PAIN: ICD-10-CM

## 2022-12-07 DIAGNOSIS — C50.021 MALIGNANT NEOPLASM INVOLVING BOTH NIPPLE AND AREOLA OF RIGHT BREAST IN MALE, ESTROGEN RECEPTOR NEGATIVE: ICD-10-CM

## 2022-12-07 DIAGNOSIS — C50.919 BREAST CANCER: Primary | ICD-10-CM

## 2022-12-07 LAB
ALBUMIN SERPL BCP-MCNC: 3.4 G/DL (ref 3.5–5.2)
ALP SERPL-CCNC: 85 U/L (ref 55–135)
ALT SERPL W/O P-5'-P-CCNC: 16 U/L (ref 10–44)
ANION GAP SERPL CALC-SCNC: 7 MMOL/L (ref 8–16)
AST SERPL-CCNC: 15 U/L (ref 10–40)
BASOPHILS # BLD AUTO: 0.02 K/UL (ref 0–0.2)
BASOPHILS NFR BLD: 0.6 % (ref 0–1.9)
BILIRUB SERPL-MCNC: 0.5 MG/DL (ref 0.1–1)
BUN SERPL-MCNC: 21 MG/DL (ref 6–20)
CALCIUM SERPL-MCNC: 8.5 MG/DL (ref 8.7–10.5)
CHLORIDE SERPL-SCNC: 108 MMOL/L (ref 95–110)
CO2 SERPL-SCNC: 24 MMOL/L (ref 23–29)
CREAT SERPL-MCNC: 1.2 MG/DL (ref 0.5–1.4)
DIFFERENTIAL METHOD: ABNORMAL
EOSINOPHIL # BLD AUTO: 0 K/UL (ref 0–0.5)
EOSINOPHIL NFR BLD: 0.9 % (ref 0–8)
ERYTHROCYTE [DISTWIDTH] IN BLOOD BY AUTOMATED COUNT: 18.1 % (ref 11.5–14.5)
EST. GFR  (NO RACE VARIABLE): >60 ML/MIN/1.73 M^2
GLUCOSE SERPL-MCNC: 126 MG/DL (ref 70–110)
HCT VFR BLD AUTO: 29 % (ref 40–54)
HGB BLD-MCNC: 9.4 G/DL (ref 14–18)
IMM GRANULOCYTES # BLD AUTO: 0.05 K/UL (ref 0–0.04)
IMM GRANULOCYTES NFR BLD AUTO: 1.5 % (ref 0–0.5)
LYMPHOCYTES # BLD AUTO: 1.2 K/UL (ref 1–4.8)
LYMPHOCYTES NFR BLD: 36.5 % (ref 18–48)
MCH RBC QN AUTO: 25.4 PG (ref 27–31)
MCHC RBC AUTO-ENTMCNC: 32.4 G/DL (ref 32–36)
MCV RBC AUTO: 78 FL (ref 82–98)
MONOCYTES # BLD AUTO: 0.3 K/UL (ref 0.3–1)
MONOCYTES NFR BLD: 8 % (ref 4–15)
NEUTROPHILS # BLD AUTO: 1.8 K/UL (ref 1.8–7.7)
NEUTROPHILS NFR BLD: 52.5 % (ref 38–73)
NRBC BLD-RTO: 0 /100 WBC
PLATELET # BLD AUTO: 191 K/UL (ref 150–450)
PMV BLD AUTO: 9.6 FL (ref 9.2–12.9)
POTASSIUM SERPL-SCNC: 4 MMOL/L (ref 3.5–5.1)
PROT SERPL-MCNC: 6.6 G/DL (ref 6–8.4)
RBC # BLD AUTO: 3.7 M/UL (ref 4.6–6.2)
SODIUM SERPL-SCNC: 139 MMOL/L (ref 136–145)
WBC # BLD AUTO: 3.37 K/UL (ref 3.9–12.7)

## 2022-12-07 PROCEDURE — A4216 STERILE WATER/SALINE, 10 ML: HCPCS | Performed by: INTERNAL MEDICINE

## 2022-12-07 PROCEDURE — 63600175 PHARM REV CODE 636 W HCPCS: Mod: JG | Performed by: INTERNAL MEDICINE

## 2022-12-07 PROCEDURE — 96413 CHEMO IV INFUSION 1 HR: CPT

## 2022-12-07 PROCEDURE — 25000003 PHARM REV CODE 250: Performed by: INTERNAL MEDICINE

## 2022-12-07 PROCEDURE — 85025 COMPLETE CBC W/AUTO DIFF WBC: CPT | Performed by: INTERNAL MEDICINE

## 2022-12-07 PROCEDURE — 80053 COMPREHEN METABOLIC PANEL: CPT | Performed by: INTERNAL MEDICINE

## 2022-12-07 RX ORDER — SODIUM CHLORIDE 9 MG/ML
INJECTION, SOLUTION INTRAVENOUS CONTINUOUS
Status: DISCONTINUED | OUTPATIENT
Start: 2022-12-07 | End: 2022-12-07 | Stop reason: HOSPADM

## 2022-12-07 RX ORDER — HYDROCODONE BITARTRATE AND ACETAMINOPHEN 10; 325 MG/1; MG/1
1 TABLET ORAL EVERY 6 HOURS PRN
Qty: 90 TABLET | Refills: 0 | Status: SHIPPED | OUTPATIENT
Start: 2022-12-07 | End: 2023-01-09 | Stop reason: SDUPTHER

## 2022-12-07 RX ORDER — SODIUM CHLORIDE 0.9 % (FLUSH) 0.9 %
10 SYRINGE (ML) INJECTION
Status: DISCONTINUED | OUTPATIENT
Start: 2022-12-07 | End: 2022-12-07 | Stop reason: HOSPADM

## 2022-12-07 RX ORDER — HEPARIN 100 UNIT/ML
500 SYRINGE INTRAVENOUS
Status: DISCONTINUED | OUTPATIENT
Start: 2022-12-07 | End: 2022-12-07 | Stop reason: HOSPADM

## 2022-12-07 RX ADMIN — PACLITAXEL 220 MG: 100 INJECTION, POWDER, LYOPHILIZED, FOR SUSPENSION INTRAVENOUS at 11:12

## 2022-12-07 RX ADMIN — HEPARIN 500 UNITS: 100 SYRINGE at 12:12

## 2022-12-07 RX ADMIN — Medication 10 ML: at 12:12

## 2022-12-07 RX ADMIN — SODIUM CHLORIDE: 0.9 INJECTION, SOLUTION INTRAVENOUS at 10:12

## 2022-12-07 NOTE — TELEPHONE ENCOUNTER
Spoke to patient due to no show 12/7/2022.  Forgot appt due to change in chemo schedule. Patient doing well, overall. Rescheduling information provided.   GENNARO Sandoval, PhD

## 2022-12-07 NOTE — PROGRESS NOTES
Spoke to patient as on my schedule for tomorrow but he was treated today with labs. Will cancel appt in AM.   Reports feeling great. Gaining weight. Maybe eating more. No CP/SOB/Swelling.   Refill hydrocodone as requested.   LARISSA

## 2022-12-07 NOTE — PLAN OF CARE
Pt completed C1D15 of Abraxane. Labs done prior to and reviewed. Pt okay to proceed with tx today. VSS. No new or worsening complaints reported today. Abraxane infused over 30 minutes with additional IVFs given. [facility] Discharged from unit in [facility].

## 2022-12-08 DIAGNOSIS — E11.65 UNCONTROLLED TYPE 2 DIABETES MELLITUS WITH HYPERGLYCEMIA: ICD-10-CM

## 2022-12-08 RX ORDER — METFORMIN HYDROCHLORIDE 500 MG/1
1000 TABLET ORAL 2 TIMES DAILY WITH MEALS
Qty: 120 TABLET | Refills: 2 | Status: SHIPPED | OUTPATIENT
Start: 2022-12-08 | End: 2022-12-20 | Stop reason: SDUPTHER

## 2022-12-09 ENCOUNTER — CLINICAL SUPPORT (OUTPATIENT)
Dept: REHABILITATION | Facility: HOSPITAL | Age: 47
End: 2022-12-09
Payer: MEDICARE

## 2022-12-09 ENCOUNTER — OFFICE VISIT (OUTPATIENT)
Dept: PSYCHIATRY | Facility: CLINIC | Age: 47
End: 2022-12-09
Payer: MEDICARE

## 2022-12-09 DIAGNOSIS — R29.3 POOR POSTURE: ICD-10-CM

## 2022-12-09 DIAGNOSIS — I89.0 LYMPHEDEMA OF RIGHT ARM: Primary | ICD-10-CM

## 2022-12-09 DIAGNOSIS — F43.23 ADJUSTMENT DISORDER WITH MIXED ANXIETY AND DEPRESSED MOOD: Primary | ICD-10-CM

## 2022-12-09 DIAGNOSIS — Z74.09 IMPAIRED FUNCTIONAL MOBILITY AND ACTIVITY TOLERANCE: ICD-10-CM

## 2022-12-09 PROCEDURE — 4010F ACE/ARB THERAPY RXD/TAKEN: CPT | Mod: CPTII,S$GLB,, | Performed by: PSYCHOLOGIST

## 2022-12-09 PROCEDURE — 90834 PSYTX W PT 45 MINUTES: CPT | Mod: S$GLB,,, | Performed by: PSYCHOLOGIST

## 2022-12-09 PROCEDURE — 3044F PR MOST RECENT HEMOGLOBIN A1C LEVEL <7.0%: ICD-10-PCS | Mod: CPTII,S$GLB,, | Performed by: PSYCHOLOGIST

## 2022-12-09 PROCEDURE — 90834 PR PSYCHOTHERAPY W/PATIENT, 45 MIN: ICD-10-PCS | Mod: S$GLB,,, | Performed by: PSYCHOLOGIST

## 2022-12-09 PROCEDURE — 1160F RVW MEDS BY RX/DR IN RCRD: CPT | Mod: CPTII,S$GLB,, | Performed by: PSYCHOLOGIST

## 2022-12-09 PROCEDURE — 1160F PR REVIEW ALL MEDS BY PRESCRIBER/CLIN PHARMACIST DOCUMENTED: ICD-10-PCS | Mod: CPTII,S$GLB,, | Performed by: PSYCHOLOGIST

## 2022-12-09 PROCEDURE — 4010F PR ACE/ARB THEARPY RXD/TAKEN: ICD-10-PCS | Mod: CPTII,S$GLB,, | Performed by: PSYCHOLOGIST

## 2022-12-09 PROCEDURE — 99999 PR PBB SHADOW E&M-EST. PATIENT-LVL I: ICD-10-PCS | Mod: PBBFAC,,, | Performed by: PSYCHOLOGIST

## 2022-12-09 PROCEDURE — 1159F PR MEDICATION LIST DOCUMENTED IN MEDICAL RECORD: ICD-10-PCS | Mod: CPTII,S$GLB,, | Performed by: PSYCHOLOGIST

## 2022-12-09 PROCEDURE — 99999 PR PBB SHADOW E&M-EST. PATIENT-LVL I: CPT | Mod: PBBFAC,,, | Performed by: PSYCHOLOGIST

## 2022-12-09 PROCEDURE — 3044F HG A1C LEVEL LT 7.0%: CPT | Mod: CPTII,S$GLB,, | Performed by: PSYCHOLOGIST

## 2022-12-09 PROCEDURE — 97140 MANUAL THERAPY 1/> REGIONS: CPT

## 2022-12-09 PROCEDURE — 1159F MED LIST DOCD IN RCRD: CPT | Mod: CPTII,S$GLB,, | Performed by: PSYCHOLOGIST

## 2022-12-09 NOTE — PROGRESS NOTES
Physical Therapy Daily Treatment Note       Date: 12/9/2022   Name: Paul Li Jr.  Clinic Number: [id number]    Therapy Diagnosis:   Encounter Diagnoses   Name Primary?    Lymphedema of right arm Yes    Impaired functional mobility and activity tolerance     Poor posture      Physician: Rosa Linn MD    Physician Orders: PT Eval and Treat -RUE lymphedema  Medical Diagnosis: Malignant neoplasm involving both nipple and areola of right breast in male, estrogen receptor negative [C50.021, Z17.1], Lymphedema of right upper extremity   Evaluation Date: 10/10/2022  Authorization Period Expiration: 10/14/22  Plan of Care Certification Period: 1/6/23 (12 weeks)  Visit # / Visits authorized: 9/ 11 (plus evaluation)  Insurance: [employer]  FOTO: 2/3     Time In: 3:17 PM  Time Out: 4:10 PM  Total Billable Time:50 minutes     Precautions: Standard, Fall, cancer, and Spine, Bony Mets, hx of Seizures, L chest wall port      Subjective     Pt reports: removed bandages yesterday because he had yard work to do.  He was compliant with self manual lymph drainage  Response to previous treatment: his Right hand is smaller  Pain Scale: Paul rates pain on a scale of 0/10 on VAS.   Pain location: N/A    Fatigue: tired from full day at [facility]  Functional change: none stated  Treatment:   Chemotherapy: te-adjuvant chemo therapy completed 10/19  Pt is currently on chemotherapy: pt is getting 1 infusion/week for 3 weeks with 1 week break, and he takes oral chemotherapy daily.  Radiation: Completed in February 2020  Surgery date: 12/17/19 pt underwent right axillary lymph node dissection and resection excess lateral tissue; 11/22/19 right simple mastectomy with SLNB; total of 18 lymph nodes removed      Objective     Objective Measures updated at progress report unless specified.     LANDMARK RIGHT UE LEFT UE DIFF RUE Diff RUE Diff RUE Diff RUE Diff RUE Diff RUE Diff  "  Date Eval Eval Eval 10/17/22 10/17/22 10/29/22 10/19/22 11/2/22 11/2/22 11/11/22 11/11/22 11/29/22 11/29/22 12/5/22 12/5/22   E + 8" 50 cm 46.5 cm 3.5 cm 48 cm -2 47.5 cm -0.5 50.5 cm +3 47.5 cm -3 49.5 cm +2 50.5 cm +1   E + 6" 45.5 cm 44 cm 1.5 cm 45 cm -0.5 46.5 cm +1.5 47 cm +0.5 45 cm -2 45.5 cm +0.5 46 cm +0.5   E + 4" 40 cm 40.5 cm 0.5 cm 40.5 cm +0.5 40.5cm No chg 41 cm +0.5 40.5 cm -0.5 40.5 cm No chg 40 cm -0.5   E + 2" 38.5 cm 38.5 cm 0 cm 39 cm +0.5 40 cm +1 39.5 cm -0.5 38.5 cm -1 38 cm -0.5 39 cm +1   Elbow 36.5 cm 34 cm 2.5 cm 35.5 cm -1 35.5 cm No chg 36 cm +0.5 34.5 cm -1.5 36 cm +1.5 36 cm No chg   W+ 8" 37.5cm 33 cm 4.5 cm 38cm +0.5 36.5 cm -1.5 37.5 cm +1 36.5 cm -1 37.5 cm +1 37.5 cm No chg   W +  6" 35 cm 29.5 cm 5.5 cm 34 cm -1 34.5 cm +0.5 33.5 cm -1 32.5 cm -1 33 cm +0.5 33 cm No chg   W + 4" 31.5 cm 25 cm 6.5 cm 31.5 cm No chg 31.5 cm No chg 29.5 cm -2 29 cm -0.5 30 cm +1 28 cm -2   Wrist 23 cm 20 cm 3 cm 22.5cm -0.5 21.5 cm -1 23 cm +1.5 21 cm -2 24 cm +3 22 cm -2   DPC 28.5 cm 26 cm 2.5 cm 28 cm -0.5 27.5cm -0.5 28.5 cm +1 27 cm -1.5 28 cm +1 27 cm -1   IP Thumb 8.5 cm 8 cm 0.5 cm 8.5 cm No chg 8.5 cm No chg 9 cm +0.5 8.5 cm -0.5 8.5 cm No chg 9 cm +0.5            Treatment     Paul received individual therapeutic exercises to improve postural correction and alignment, stretching and soft tissue mobility, and strengthening for 3 minutes including the following:       Scapular retractions  x 10 reps  Shoulder rolls, 10 reps each fwd and back      Paul received the following manual therapy techniques were performed to increased myofascial/soft tissue length, mobility and pliability, increase PROM, AROM and function as well as to decrease pain for 50 minutes      Diaphragmatic breathing, x 5 reps    Short Neck- held due to history of seizures  Clear Healthy Lymph Nodes:  Left Axilla                                                  Right Groin  Open Anastomoses:  Anterior Axillo-Axillary  " (AAA)                                    Axillo-Inguinal     (AI)                                    Posterior Axillo-Axillary  (GEREMIAS)       Right Upper Arm (Lateral and Medial)  Right  Antecubital Fossa  Right Dorsal Forearm  Right Volar Forearm  Dorsum Right Hand  Right fingers     Rework Right UE  Rework Anastomoses  Rework Healthy lymph Nodes      Diaphragmatic breathing, x 5 reps    PT applies  gauze to right fingers and hand and stockinette, cotton artiflex, and short stretch bandages to pt's RIGHT/LEFT/BILATERAL: right upper extremity. Pt educated to wear bandaging until next session unless it causes pain, numbness, or changes in skin color/temperature, or if they start to fall down.  If removed, pt instructed to roll up bandages and cotton padding and bring to next session. If bandages are removed, pt can wear his tubigrip.  Pt has Vet glove to cover bandages in the shower, and we reviewed directions on how to care for bandages. Pt verbalizes understanding of all topics.                Home Exercise Program and Patient Education   Education provided re:  - role of PT in multi - disciplinary team, goals for PT  - progress towards goals         Written Home Exercises Provided: Patient instructed to cont prior HEP.  Exercises were reviewed and Paul was able to demonstrate them prior to the end of the session.  Paul demonstrated good  understanding of the education provided.     See EMR under Media for self lymphatic drainage provided prior visit.    Pt has no cultural, educational or language barriers to learning provided.    Assessment     Patient tolerated session well.  Pt has been cleared by referring physician to receive complete decongestive therapy. He has been compliant with self manual lymph drainage and wearing compression bandages/tubigrip. He tolerated complete decongestive therapy well.  Will gauge response at next session and progress as tolerated. Once his measurements stabilize, he will benefit  from a compression sleeve, and he will also benefit from a compression pump to manage his symptoms.      Pt prognosis is Good. Pt will continue to benefit from skilled outpatient physical therapy to address the deficits listed in the problem list chart on initial evaluation, provide pt/family education and to maximize pt's level of independence in the home and community environment.     Anticipated barriers to physical therapy: advanced disease    Goals as follows:  Short Term Goals (6 weeks)  1. Pt will demo decrease in girth in right upper extremity by >/= 2cm to allow for improved UE symmetry, clothing choice, ROM, and UE function. (progressing, not met)  2. Patient will demonstrate 100% knowledge of lymphedema precautions and signs of infection to allow for reduced risk of lymphedema, reduced risk of infection, and/or exacerbation.  (met)  3. Patient will perform self lymph drainage techniques to enhance lymphatic drainage and mobility.  ( met)  4. Patient will tolerate daily activities with multilayered bandaging to enhance lymphatic drainage and skin elasticity.  (met)  5. Pt will tolerate HEP for improved strength, functional mobility, ROM, posture, and endurance. (met)        Long Term Goals: ( 12  weeks)  1. Patient to show decreased girth in right upper extremity by >/= 3cm to allow for improved UE symmetry, clothing choice, ROM, and UE function.  (progressing, not met)  2. Patient will show reduction in density to mild or less with improved contour of limb to allow for improved cosmesis, improved lymphatic drainage, and functional mobility.  (progressing, not met)  3. Patient to bruna/doff compression garment with daily compliance to support lymphatic mobility and skin elasticity.  (progressing, not met)  4. Pt to show improved postural awareness and alignment for improved functional mobility.  (progressing, not met)  5. Pt to be independent and compliant with HEP to allow for improved ROM, reach and carry  with functional endurance and strength.    (progressing, not met)           Plan   Outpatient physical therapy 2x week for 12 weeks to include the following:   Manual Therapy, Neuromuscular Re-ed, Orthotic Management and Training, Patient Education, Self Care, Therapeutic Activities, and Therapeutic Exercise.     Plan of care Certification Period: 10/10/2022 to 1/6/23.       Therapist: Katelynn Harrell, PT  12/9/2022

## 2022-12-09 NOTE — PROGRESS NOTES
PSYCHO-ONCOLOGY NOTE/ Individual Psychotherapy     Date: 12/9/2022   Site:  [facility]         Therapeutic Intervention: Met with patient.  Outpatient - Behavior modifying psychotherapy 45 min - CPT code 90997    Referring provider:  Kute intially, now [facility]    Chief complaint/reason for encounter: Met with patient to evaluate psychosocial adaptation to survivorship of metastatic breast cancer  The patient's last visit with me was on 10/5/2022.      Objective:    Paul Li Jr. arrived promptly for the session.  Mr. Li was independently ambulatory at the time of session. The patient was fully cooperative throughout the session.   Appearance: age appropriate, casually  dressed, well groomed  Behavior/Cooperation: friendly and cooperative  Speech: normal in rate, volume, and tone and appropriate quality, quantity and organization of sentences  Mood: euthymic  Affect: mood congruent, full range and appropriate  Thought Process: goal-directed, logical  Thought Content: normal,  No delusions or paranoia; did not appear to be responding to internal stimuli during the session  Orientation: grossly intact  Memory: Grossly intact  Attention Span/Concentration: Attends to session without distraction; reports no difficulty  Fund of Knowledge: average  Estimate of Intelligence: average from verbal skills and history  Cognition: grossly intact  Insight: patient has awareness of illness; adequate insight into own behavior and behavior of others  Judgment: the patient's behavior is adequate to circumstances      Interval history and content of current session: Patient reports significant improvement in his mood and marital relationship since participating in IOP. He especially benefited from the DBT skills taught. He is better able to identify triggers for distressing emotions (Cancer/prognosis-focused conversations, when wife focuses on family instead of him). He is learning to use cognitive skills to challenge  distorted thinking. Discussed relapse prevention.    PT is helping his lymphedema.    Discussed holiday plans and December and January anticipated births of grandchildren.    Patient is very thankful for the support of oncology and palliative care teams during his distress.    Previously:  Discussed other health challenges. HTN- not taking home pressures regularly, discussed rationale, encouraged adherence); CPAP- no longer feels better when uses, 100 lb weight loss since last PSG, encouraged reconnection with sleep medicine;     Risk parameters:   Patient reports no suicidal ideation  Patient reports no homicidal ideation  Patient reports no self-injurious behavior  Patient reports no violent behavior   Safety needs:  None at this time      Verbal deficits: None     Patient's response to intervention:The patient's response to intervention is accepting, motivated.     Progress toward goals and other mental status changes:  The patient's progress toward goals is good.      Progress to date:Progress as Expected      Goals from last visit: Met      Patient reported outcomes:      Distress Thermometer:   Distress Score             Practical Problems Physical Problems                                                   Family Problems                                         Emotional Problems                                                         Spiritual/Religions Concerns               Other Problems              PHQ-9= not completed today Initial visit: 8       BLIL-7=not completed today; Initial visit: 8     GAD7 10/17/2022   1. Feeling nervous, anxious, or on edge? 1   2. Not being able to stop or control worrying? 0   3. Worrying too much about different things? 2   4. Trouble relaxing? 3   5. Being so restless that it is hard to sit still? 2   6. Becoming easily annoyed or irritable? 3   7. Feeling afraid as if something awful might happen? 1   8. If you checked off any problems, how difficult have these problems  made it for you to do your work, take care of things at home, or get along with other people? 2   BILL-7 Score 12         Client Strengths: verbal, intelligent, successful, good social support, commitment to wellness, strong guadalupe, strong cultural traditions      Treatment Plan:individual psychotherapy  Target symptoms: adjustment  Why chosen therapy is appropriate versus another modality: relevant to diagnosis, patient responds to this modality, evidence based practice  Outcome monitoring methods: self-report, observation, checklist/rating scale  Therapeutic intervention type: behavior modifying psychotherapy  Prognosis: Good      Behavioral goals:    Exercise: walking 2 miles/day   Stress management:  here and now focus   Social engagement: seeing son's [employer] game   Nutrition:    Smoking Cessation:   Therapy: DBT skills    Prior: return to sleep medicine   Regular home BP monitoring         Return to clinic: as needed     Length of Service (minutes direct face-to-face contact): 45      ICD-10-CM ICD-9-CM   1. Adjustment disorder with mixed anxiety and depressed mood  F43.23 309.28         Matthew Sandoval, PhD  [place] License #[id number]

## 2022-12-13 ENCOUNTER — CLINICAL SUPPORT (OUTPATIENT)
Dept: REHABILITATION | Facility: HOSPITAL | Age: 47
End: 2022-12-13
Payer: MEDICARE

## 2022-12-13 DIAGNOSIS — R29.3 POOR POSTURE: ICD-10-CM

## 2022-12-13 DIAGNOSIS — Z74.09 IMPAIRED FUNCTIONAL MOBILITY AND ACTIVITY TOLERANCE: ICD-10-CM

## 2022-12-13 DIAGNOSIS — I89.0 LYMPHEDEMA OF RIGHT ARM: Primary | ICD-10-CM

## 2022-12-13 PROCEDURE — 97140 MANUAL THERAPY 1/> REGIONS: CPT

## 2022-12-13 NOTE — PROGRESS NOTES
"                                                    Physical Therapy Daily Treatment Note       Date: 12/13/2022   Name: Paul Li Jr.  Clinic Number: [id number]    Therapy Diagnosis:   Encounter Diagnoses   Name Primary?    Lymphedema of right arm Yes    Impaired functional mobility and activity tolerance     Poor posture      Physician: Rosa Linn MD    Physician Orders: PT Eval and Treat -RUE lymphedema  Medical Diagnosis: Malignant neoplasm involving both nipple and areola of right breast in male, estrogen receptor negative [C50.021, Z17.1], Lymphedema of right upper extremity   Evaluation Date: 10/10/2022  Authorization Period Expiration: 10/14/22  Plan of Care Certification Period: 1/6/23 (12 weeks)  Visit # / Visits authorized: 10/ 11 (plus evaluation)  Insurance: [employer]  FOTO: 2/3     Time In: 2:20 PM  Time Out: 3:13 PM  Total Billable Time: 53 minutes     Precautions: Standard, Fall, cancer, and Spine, Bony Mets, hx of Seizures, L chest wall port      Subjective     Pt reports: removed bandages yesterday, as they started to fall down  He was compliant with self manual lymph drainage  Response to previous treatment: his Right hand is smaller  Pain Scale: Paul rates pain on a scale of 0/10 on VAS.   Pain location: N/A    Fatigue: "feel fine"  Functional change: none stated  Treatment:   Chemotherapy: te-adjuvant chemo therapy completed 10/19  Pt is currently on chemotherapy: pt is getting 1 infusion/week for 3 weeks with 1 week break, and he takes oral chemotherapy daily.  Radiation: Completed in February 2020  Surgery date: 12/17/19 pt underwent right axillary lymph node dissection and resection excess lateral tissue; 11/22/19 right simple mastectomy with SLNB; total of 18 lymph nodes removed      Objective     Objective Measures updated at progress report unless specified.     LANDMARK RIGHT UE LEFT UE DIFF RUE Diff RUE Diff RUE Diff RUE Diff RUE Diff RUE Diff RUE Diff   Date " "Eval Eval Eval 10/17/22 10/17/22 10/29/22 10/19/22 11/2/22 11/2/22 11/11/22 11/11/22 11/29/22 11/29/22 12/5/22 12/5/22 12/13/22 12/13/22   E + 8" 50 cm 46.5 cm 3.5 cm 48 cm -2 47.5 cm -0.5 50.5 cm +3 47.5 cm -3 49.5 cm +2 50.5 cm +1 48.5 cm -2   E + 6" 45.5 cm 44 cm 1.5 cm 45 cm -0.5 46.5 cm +1.5 47 cm +0.5 45 cm -2 45.5 cm +0.5 46 cm +0.5 45 cm -1   E + 4" 40 cm 40.5 cm 0.5 cm 40.5 cm +0.5 40.5cm No chg 41 cm +0.5 40.5 cm -0.5 40.5 cm No chg 40 cm -0.5 41.5 cm +1.5    E + 2" 38.5 cm 38.5 cm 0 cm 39 cm +0.5 40 cm +1 39.5 cm -0.5 38.5 cm -1 38 cm -0.5 39 cm +1 39 cm No chg   Elbow 36.5 cm 34 cm 2.5 cm 35.5 cm -1 35.5 cm No chg 36 cm +0.5 34.5 cm -1.5 36 cm +1.5 36 cm No chg 35 cm -1   W+ 8" 37.5cm 33 cm 4.5 cm 38cm +0.5 36.5 cm -1.5 37.5 cm +1 36.5 cm -1 37.5 cm +1 37.5 cm No chg 37 cm -0.5   W +  6" 35 cm 29.5 cm 5.5 cm 34 cm -1 34.5 cm +0.5 33.5 cm -1 32.5 cm -1 33 cm +0.5 33 cm No chg 33cm No chg   W + 4" 31.5 cm 25 cm 6.5 cm 31.5 cm No chg 31.5 cm No chg 29.5 cm -2 29 cm -0.5 30 cm +1 28 cm -2 29 cm +1   Wrist 23 cm 20 cm 3 cm 22.5cm -0.5 21.5 cm -1 23 cm +1.5 21 cm -2 24 cm +3 22 cm -2 22 cm No chg   DPC 28.5 cm 26 cm 2.5 cm 28 cm -0.5 27.5cm -0.5 28.5 cm +1 27 cm -1.5 28 cm +1 27 cm -1 26.5 cm -0.5   IP Thumb 8.5 cm 8 cm 0.5 cm 8.5 cm No chg 8.5 cm No chg 9 cm +0.5 8.5 cm -0.5 8.5 cm No chg 9 cm +0.5 8.5 cm -0.5            Treatment     Paul received individual therapeutic exercises to improve postural correction and alignment, stretching and soft tissue mobility, and strengthening for 5 minutes including the following:     Diaphragmatic breathing, 2 x 5 reps  Scapular retractions  2 x 10 reps  Shoulder rolls, 10 reps each fwd and back      Paul received the following manual therapy techniques were performed to increased myofascial/soft tissue length, mobility and pliability, increase PROM, AROM and function as well as to decrease pain for 48 minutes    Measurements taken above    Short Neck- held due to " history of seizures  Clear Healthy Lymph Nodes:  Left Axilla                                                  Right Groin  Open Anastomoses:  Anterior Axillo-Axillary  (AAA)                                    Axillo-Inguinal     (AI)                                    Posterior Axillo-Axillary  (GEREMIAS)       Right Upper Arm (Lateral and Medial)  Right  Antecubital Fossa  Right Dorsal Forearm  Right Volar Forearm  Dorsum Right Hand  Right fingers     Rework Right UE  Rework Anastomoses  Rework Healthy lymph Nodes      PT applies  gauze to right fingers and hand and stockinette, cotton artiflex, and short stretch bandages to pt's RIGHT/LEFT/BILATERAL: right upper extremity. Pt educated to wear bandaging until next session unless it causes pain, numbness, or changes in skin color/temperature, or if they start to fall down.  If removed, pt instructed to roll up bandages and cotton padding and bring to next session. If bandages are removed, pt can wear his tubigrip.  Pt has Vet glove to cover bandages in the shower, and we reviewed directions on how to care for bandages. Pt verbalizes understanding of all topics.                Home Exercise Program and Patient Education   Education provided re:  - role of PT in multi - disciplinary team, goals for PT  - progress towards goals         Written Home Exercises Provided: Patient instructed to cont prior HEP.  Exercises were reviewed and Paul was able to demonstrate them prior to the end of the session.  Paul demonstrated good  understanding of the education provided.     See EMR under Media for self lymphatic drainage provided prior visit.    Pt has no cultural, educational or language barriers to learning provided.    Assessment     Patient tolerated session well.  Pt has been cleared by referring physician to receive complete decongestive therapy. He has been compliant with exercise, elevation, self manual lymph drainage, and wearing compression bandages/tubigrip. He  tolerated complete decongestive therapy well.  He demo's good circumference reductions overall. Once his measurements stabilize, he will benefit from a compression sleeve and glove, and he will also benefit from a compression pump to manage his symptoms.      Pt prognosis is Good. Pt will continue to benefit from skilled outpatient physical therapy to address the deficits listed in the problem list chart on initial evaluation, provide pt/family education and to maximize pt's level of independence in the home and community environment.     Anticipated barriers to physical therapy: advanced disease    Goals as follows:  Short Term Goals (6 weeks)  1. Pt will demo decrease in girth in right upper extremity by >/= 2cm to allow for improved UE symmetry, clothing choice, ROM, and UE function. (progressing, not met)  2. Patient will demonstrate 100% knowledge of lymphedema precautions and signs of infection to allow for reduced risk of lymphedema, reduced risk of infection, and/or exacerbation.  (met)  3. Patient will perform self lymph drainage techniques to enhance lymphatic drainage and mobility.  ( met)  4. Patient will tolerate daily activities with multilayered bandaging to enhance lymphatic drainage and skin elasticity.  (met)  5. Pt will tolerate HEP for improved strength, functional mobility, ROM, posture, and endurance. (met)        Long Term Goals: ( 12  weeks)  1. Patient to show decreased girth in right upper extremity by >/= 3cm to allow for improved UE symmetry, clothing choice, ROM, and UE function.  (progressing, not met)  2. Patient will show reduction in density to mild or less with improved contour of limb to allow for improved cosmesis, improved lymphatic drainage, and functional mobility.  (progressing, not met)  3. Patient to bruna/doff compression garment with daily compliance to support lymphatic mobility and skin elasticity.  (progressing, not met)  4. Pt to show improved postural awareness and  alignment for improved functional mobility.  (progressing, not met)  5. Pt to be independent and compliant with HEP to allow for improved ROM, reach and carry with functional endurance and strength.    (progressing, not met)           Plan   Outpatient physical therapy 2x week for 12 weeks to include the following:   Manual Therapy, Neuromuscular Re-ed, Orthotic Management and Training, Patient Education, Self Care, Therapeutic Activities, and Therapeutic Exercise.     Plan of care Certification Period: 10/10/2022 to 1/6/23.       Therapist: Katelynn Harrell, PT  12/13/2022

## 2022-12-15 ENCOUNTER — OFFICE VISIT (OUTPATIENT)
Dept: PALLIATIVE MEDICINE | Facility: CLINIC | Age: 47
End: 2022-12-15
Payer: MEDICARE

## 2022-12-15 VITALS — DIASTOLIC BLOOD PRESSURE: 67 MMHG | HEART RATE: 64 BPM | SYSTOLIC BLOOD PRESSURE: 126 MMHG

## 2022-12-15 DIAGNOSIS — F43.23 ADJUSTMENT DISORDER WITH MIXED ANXIETY AND DEPRESSED MOOD: ICD-10-CM

## 2022-12-15 DIAGNOSIS — C50.021 MALIGNANT NEOPLASM INVOLVING BOTH NIPPLE AND AREOLA OF RIGHT BREAST IN MALE, ESTROGEN RECEPTOR NEGATIVE: Primary | ICD-10-CM

## 2022-12-15 DIAGNOSIS — Z51.5 ENCOUNTER FOR PALLIATIVE CARE: ICD-10-CM

## 2022-12-15 DIAGNOSIS — G47.00 INSOMNIA, UNSPECIFIED TYPE: ICD-10-CM

## 2022-12-15 DIAGNOSIS — G89.3 CANCER RELATED PAIN: ICD-10-CM

## 2022-12-15 DIAGNOSIS — R41.840 POOR CONCENTRATION: ICD-10-CM

## 2022-12-15 DIAGNOSIS — R19.7 DIARRHEA, UNSPECIFIED TYPE: ICD-10-CM

## 2022-12-15 DIAGNOSIS — R53.0 NEOPLASTIC (MALIGNANT) RELATED FATIGUE: ICD-10-CM

## 2022-12-15 DIAGNOSIS — Z17.1 MALIGNANT NEOPLASM INVOLVING BOTH NIPPLE AND AREOLA OF RIGHT BREAST IN MALE, ESTROGEN RECEPTOR NEGATIVE: Primary | ICD-10-CM

## 2022-12-15 DIAGNOSIS — C79.51 BONE METASTASES: ICD-10-CM

## 2022-12-15 DIAGNOSIS — M79.2 NEUROPATHIC PAIN: ICD-10-CM

## 2022-12-15 DIAGNOSIS — Z71.89 ADVANCED CARE PLANNING/COUNSELING DISCUSSION: ICD-10-CM

## 2022-12-15 PROCEDURE — 4010F ACE/ARB THERAPY RXD/TAKEN: CPT | Mod: CPTII,S$GLB,, | Performed by: STUDENT IN AN ORGANIZED HEALTH CARE EDUCATION/TRAINING PROGRAM

## 2022-12-15 PROCEDURE — 1159F MED LIST DOCD IN RCRD: CPT | Mod: CPTII,S$GLB,, | Performed by: STUDENT IN AN ORGANIZED HEALTH CARE EDUCATION/TRAINING PROGRAM

## 2022-12-15 PROCEDURE — 4010F PR ACE/ARB THEARPY RXD/TAKEN: ICD-10-PCS | Mod: CPTII,S$GLB,, | Performed by: STUDENT IN AN ORGANIZED HEALTH CARE EDUCATION/TRAINING PROGRAM

## 2022-12-15 PROCEDURE — 1160F RVW MEDS BY RX/DR IN RCRD: CPT | Mod: CPTII,S$GLB,, | Performed by: STUDENT IN AN ORGANIZED HEALTH CARE EDUCATION/TRAINING PROGRAM

## 2022-12-15 PROCEDURE — 3078F DIAST BP <80 MM HG: CPT | Mod: CPTII,S$GLB,, | Performed by: STUDENT IN AN ORGANIZED HEALTH CARE EDUCATION/TRAINING PROGRAM

## 2022-12-15 PROCEDURE — 3074F PR MOST RECENT SYSTOLIC BLOOD PRESSURE < 130 MM HG: ICD-10-PCS | Mod: CPTII,S$GLB,, | Performed by: STUDENT IN AN ORGANIZED HEALTH CARE EDUCATION/TRAINING PROGRAM

## 2022-12-15 PROCEDURE — 3074F SYST BP LT 130 MM HG: CPT | Mod: CPTII,S$GLB,, | Performed by: STUDENT IN AN ORGANIZED HEALTH CARE EDUCATION/TRAINING PROGRAM

## 2022-12-15 PROCEDURE — 99215 PR OFFICE/OUTPT VISIT, EST, LEVL V, 40-54 MIN: ICD-10-PCS | Mod: S$GLB,,, | Performed by: STUDENT IN AN ORGANIZED HEALTH CARE EDUCATION/TRAINING PROGRAM

## 2022-12-15 PROCEDURE — 3044F HG A1C LEVEL LT 7.0%: CPT | Mod: CPTII,S$GLB,, | Performed by: STUDENT IN AN ORGANIZED HEALTH CARE EDUCATION/TRAINING PROGRAM

## 2022-12-15 PROCEDURE — 99215 OFFICE O/P EST HI 40 MIN: CPT | Mod: S$GLB,,, | Performed by: STUDENT IN AN ORGANIZED HEALTH CARE EDUCATION/TRAINING PROGRAM

## 2022-12-15 PROCEDURE — 3078F PR MOST RECENT DIASTOLIC BLOOD PRESSURE < 80 MM HG: ICD-10-PCS | Mod: CPTII,S$GLB,, | Performed by: STUDENT IN AN ORGANIZED HEALTH CARE EDUCATION/TRAINING PROGRAM

## 2022-12-15 PROCEDURE — 99999 PR PBB SHADOW E&M-EST. PATIENT-LVL II: CPT | Mod: PBBFAC,,, | Performed by: STUDENT IN AN ORGANIZED HEALTH CARE EDUCATION/TRAINING PROGRAM

## 2022-12-15 PROCEDURE — 1159F PR MEDICATION LIST DOCUMENTED IN MEDICAL RECORD: ICD-10-PCS | Mod: CPTII,S$GLB,, | Performed by: STUDENT IN AN ORGANIZED HEALTH CARE EDUCATION/TRAINING PROGRAM

## 2022-12-15 PROCEDURE — 99999 PR PBB SHADOW E&M-EST. PATIENT-LVL II: ICD-10-PCS | Mod: PBBFAC,,, | Performed by: STUDENT IN AN ORGANIZED HEALTH CARE EDUCATION/TRAINING PROGRAM

## 2022-12-15 PROCEDURE — 1160F PR REVIEW ALL MEDS BY PRESCRIBER/CLIN PHARMACIST DOCUMENTED: ICD-10-PCS | Mod: CPTII,S$GLB,, | Performed by: STUDENT IN AN ORGANIZED HEALTH CARE EDUCATION/TRAINING PROGRAM

## 2022-12-15 PROCEDURE — 3044F PR MOST RECENT HEMOGLOBIN A1C LEVEL <7.0%: ICD-10-PCS | Mod: CPTII,S$GLB,, | Performed by: STUDENT IN AN ORGANIZED HEALTH CARE EDUCATION/TRAINING PROGRAM

## 2022-12-15 RX ORDER — TRAZODONE HYDROCHLORIDE 100 MG/1
100 TABLET ORAL NIGHTLY
Qty: 30 TABLET | Refills: 0 | Status: SHIPPED | OUTPATIENT
Start: 2022-12-15 | End: 2023-03-10 | Stop reason: SDUPTHER

## 2022-12-15 NOTE — PROGRESS NOTES
"[facility]- Palliative Medicine [facility]  Palliative Care   Social Work Visit      Patient Name: Paul Li Jr.  MRN: [id number]  Palliative Care Provider:  Angeles Rodriguez MD  Primary Care Physician: Kareem Arroyo MD  Principal Problem: Malignant neoplasm involving both nipple and areola of right breast in male    SW accompanied MD during follow up visit with patient. Patient presents AAOX4 with pleasant and appropriate mood.  Patient reports things are "all positive" since completing his intensive outpatient behavioral health program. Patient notes he is implementing practices to improve interpersonal communication and has learned to "take a step back and process things differently". Patient adds he hopes to receive follow up with the program once  a spot becomes available. (Patient's name is on a waiting list.) Patient encouraged to reach out to [facility] in the near future to inquire about his position on said list.      Patient reflected upon upcoming events in his family including the births of grandchildren and his son's final college football game. Patient adds he is hopeful to return to the gym as he wishes to "keep up" with his son who is working to be recruited to the [employer].    Patient acknowledges difficulty sleeping. Team reviewed mindfulness practices such as body scan and deep breathing. SW encouraged patient to implement techniques learned through [facility] and his recent yoga practice.  MD to place referral for updated sleep study, as patient reports his Bipap has not been effective.    Patient denies further concerns. SW remains available to provide psychosocial support. SW will continue to follow.    [facility]  Outpatient   Palliative Medicine        "

## 2022-12-15 NOTE — PROGRESS NOTES
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 Linn  - currently on disease directed therapy    Encounter for palliative care/Advanced care planning  - patient decisional  - patient by himself during visit today  - no ACP documents uploaded into EMR  - goals: life prolonging  - philosophy of Palliative Medicine reviewed with patient at first visit  - new patient folder given to and briefly reviewed with patient at first visit  - ACP booklet given to and reviewed with patient at first visit by Sarabjit Casas RN  - code status not specifically discussed at this visit  - will follow up at future appointments for any questions/concerns that arise after patient reviews new patient information   - per chart review, patient's oncology team did start talk about overall poor prognosis of his cancer; however, patient has has been doing very well at this time    Adjustment disorder with mixed anxiety and depressed mood  - patient reports significant improvement in his anxiety/depression since he completed IOP. He has continued follow up with Dr. Sandoval  - please see previously notes about topics discussed at prior appointments  - instructed patient to continue to reach out to psychiatry to get enrolled with continued group therapy  - patient denies any SI/HI at this time  - patient stating that since he started IOP through today things have been much improved. He states everyone in his life is now on his team it feels  - he expresses that he is feeling more relaxed and happy in general  - emotional support provided today along with Pall Med SW; please see SW note for full details  - continue prozac with no changes in dosing at this time    Poor  concentration/neoplasm related fatigue  - patient stated at previous visit that he feels trouble focusing and still has some decreased energy since his covid infection  - today patient states that overall he is feeling more improved   - he is able to work some now, and he is wanting to go back to the gym  - discussed safety with resuming gym activities  - previously discussed that he should make a list, that is easily accessible, such as on his phone. Patient should write down daily his goals for the day and work towards them and then check them off when completed  - dakotanet states he is able to accomplish some work when he works at his pace. He does take breaks and tries to stay hydrated   - will continue to monitor    Insomnia  - initially patient denied any insomnia; however, during visit patient described having a difficult time sleeping. He reports current trazodone dose is not helping  - he has episodes that he is unable to stop his brain from thinking/wandering off  - previously he feels much better with use of cpap, but now he finds that using cpap is not helpful  - etiology likely multifactorial including need for CPAP (recently approved by insurance) as well as no consistent sleep schedule for years  - tip sheet for better sleep in new patient folder for patient to review, including information about sleep schedule  - increase trazodone to 100 mg qhs  - referred patient back to sleep medicine today  - will continue close monitoring    Diarrhea  - patient denies diarrhea at this time  - it has improved recently with lomotil once a day  - refill sent in for patient prior to today's visit  - encouraged patient to stay hydrated for good bowel movements  - will continue to monitor    Cancer related pain/Neuropathic pain  - patient denies any pain at this time. He states he wants to continue to be more active.   - he states his main issue is neuropathy in his bilateral lower extremities with L > R  - patient  also using compression sleeve for right arm lymphedema; he is working with PT for lymphedema  - patient reports that he uses his Norco only when he is working. He will sometimes need up to two to three doses if he is very active in the job  - no need for refill of norco at this time  - he uses gabapentin 300 mg TID with moderate relief  - previously recommended increasing gabapentin to 600 mg qhs and continue 300 mg at other times  - opioid safety sheet in new patient folder for patient to review  - will continue to monitor    Understanding of illness/Prognosis: patient has good understanding of his illness; prognosis is guarded    Goals of care: life prolonging    Follow up: ~ 6-8 weeks    Patient's encounter and above plan of care discussed with patient's oncology-psychologist    SUBJECTIVE:     History of Present Illness:  Patient is a 47 y.o. year old male with arthritis, DM, HTN, and right sided breast cancer presents to Palliative Medicine for symptom management and ACP. Please see oncology notes for full details of oncologic history and treatment course.     12/15/2022:  [place]  reviewed and summarized:  12/08/2022 Lomotil Disp: 60 for 15 days  12/08/2022 Gabapentin 300 mg Disp: 90 for 30 days  12/07/2022 Hydrocodone-apap  mg Disp: 90 for 23 days    Patient has missed a couple of appointments since last visit. He has completed IOP therapy. Patient is tolerating treatment well. He is still following with Dr. Sandoval. Today patient states he is overall doing well. Patient denies any symptoms on ESAS today. He reports since enrolling and completing IOP with psychiatry, he has felt overall much better. He finds that he is able to be more relaxed and has better relationship with family members, including wife. Patient has been continuing to try to work intermittently. He uses pain medication when needed with an active job. Patient having increased insomnia. Trazodone and cpap are not helping at this time.  "Patient spoke of multiple upcoming events (son in [place] game for [employer] then at [employer] week for [employer], two upcoming births of grandbabies, etc)      10/11/2022:  [place]  reviewed and summarized:  09/22/2022 Gabapentin 300 mg Disp: 90 for 30 days  09/12/2022 Alprazolam 0.5 mg Disp: 60 for 20 days  09/12/2022 Hydrocodone-apap  mg Disp: 90 for 23 days    Patient has met with oncology-psychology and oncology team since last visit. Today patient states he is having a better day, though he presents very depressed. Patient spoke in detail about the challenges that have occurred since audio visit. Patient still experiencing significant depression/anxiety. He only finds some happiness when he is out of his house. He finds himself "playing a role" at home trying to fit in. He has sense of heaviness at home. Patient also questioned if it was okay "not to like the way someone loves you". Patient stated his wife has initiated more physical intimacy and using "her body to get to me". Patient reports he "gave in" after repeated rejections. Patient denying any SI/HI at this time, but he is requesting resources for an inpatient psychiatry stay to help him with his significant mental/emotional health needs. Oncology-psychologist joined visit towards the end. Dr. Monroy involved after appointment as well. Patient was also contacted via telephone by this writer twice after completion of visit in person and then [facility] followed up with patient on 10/12/22 via phone.     09/30/2022:  Audio only appointment. Please see note for full details of encounter.     09/02/2022:  [place]  reviewed and summarized:  08/14/2022 Gabapentin 300 mg Disp: 90 for 30 days  08/09/2022 Hydrocodone-APAP  mg Disp: 90 for 23 days  08/01/2022 Alprazolam 0.5 mg disp: 60 for 20 days    Patient doing well with Abraxane and PO Aplelisib. Patient has lymphedema. Patient has missed or rescheduled multiple appointments since last visit. Today " patient states he is doing okay. Patient still having increased anxiety/depression. He is being followed by Dr. Sandoval. He states that his wife has come to those appointments, which has been helpful. Patient is still having some difficulty with communicating his needs with other members of his support system. Patient denies any pain. He is still feeling some fatigue since his covid infection, though he is able to be more active. He reports that he is able to work at his own pace and takes breaks. He is trying to stay hydrated. Patient reports intermittent thoughts at night. His lomotil is working. He is having some difficulty with concentrating.     02/11/2022:  [place]  reviewed and summarized:  01/24/2022 Norco  mg Disp: 90 for 23 days    Today patient states he is doing much better. He discussed about how he and his wife had a long discussion, and he finds that they are in a much better place. He is open to suggestions regarding dating his wife and himself. Patient reporting his sleeping has improved too. He still has some diarrhea.     12/13/2021:  [place]  reviewed and summarized:  11/11/2021 Norco  mg Disp: 90 for 23 days  11/10/2021 Alprazolam 0.5 mg Disp: 60 for 20 days  11/05/2021 Lomotil 2.5-0.035 mg Disp: 40 for 10 days    Today patient states his diarrhea has improved with use of lomotil once a day. Patient also had significant improvement in his sleep and energy with use of cpap. Patient has been having increased emotional distress due to multiple stressors. Please see SW note for further details.     10/11/2021:  [place]  reviewed and summarized:  10/02/2021 Norco 5-325 mg Disp: 28 for 7 days    Patient presents to clinic by himself today. He reports his pain has significantly improved, and he is using Norco only once a week if that often. Patient is compliant with his gabapentin for neuropathic pain in his lower extremities. He does not sleep much overnight, only 4-4.5 hours. Patient has  noticed improvement in his mood with prozac. He does find himself having increase in his emotions at times, both positive and negative. Patient spoke about how the initial diagnosis as well as recurrence has really affected him. He is open to learning about ACP.     Past Medical History:   Diagnosis Date    Breast cancer     Cancer     R breast    Diabetes mellitus     HTN (hypertension)     Leg edema, right     Prediabetes     Seizures     at age 18 - stress related    Therapy     marital counseling     Past Surgical History:   Procedure Laterality Date    AXILLARY NODE DISSECTION Right 11/22/2019    Procedure: LYMPHADENECTOMY, AXILLARY RIGHT;  Surgeon: Shima Whyte MD;  Location: [facility];  Service: General;  Laterality: Right;    AXILLARY NODE DISSECTION Right 12/17/2019    Procedure: LYMPHADENECTOMY, AXILLARY;  Surgeon: Shima Whyte MD;  Location: [facility] OR [address] [place] [address];  Service: General;  Laterality: Right;    BREAST BIOPSY      EXCISION OF HYDROCELE Right 10/28/2022    Procedure: HYDROCELECTOMY;  Surgeon: Anthony Cordero Jr., MD;  Location: [address];  Service: Urology;  Laterality: Right;  1 hour    INSERTION OF TUNNELED CENTRAL VENOUS CATHETER (CVC) WITH SUBCUTANEOUS PORT Left 5/24/2019    Procedure: [id number];  Surgeon: Shima Whyte MD;  Location: [facility] OR [address] [place] [address];  Service: General;  Laterality: Left;    INSERTION OF TUNNELED CENTRAL VENOUS CATHETER (CVC) WITH SUBCUTANEOUS PORT Left 9/17/2021    Procedure: INSERTION, SINGLE LUMEN CATHETER WITH PORT, WITH FLUOROSCOPIC GUIDANCE, right;  Surgeon: Gloria Holliday MD;  Location: [facility] OR [address] [place] [address];  Service: General;  Laterality: Left;  rapid covid test    SENTINEL LYMPH NODE BIOPSY Right 11/22/2019    Procedure: BIOPSY, LYMPH NODE, SENTINEL RIGHT;  Surgeon: Shima Whyte MD;  Location: [facility];  Service: General;  Laterality: Right;    SIMPLE MASTECTOMY Right 11/22/2019    Procedure: MASTECTOMY, SIMPLE RIGHT (CONSENT AM OF) 2.5 hr case;   Surgeon: Shima Whyte MD;  Location: [facility];  Service: General;  Laterality: Right;     Family History   Problem Relation Age of Onset    Hypertension Mother     Diabetes Mother     Hypertension Father     Lupus Father     Post-traumatic stress disorder Brother     No Known Problems Maternal Grandmother     Colon cancer Maternal Grandfather     Breast cancer Paternal Grandmother     No Known Problems Paternal Grandfather     No Known Problems Sister     No Known Problems Maternal Aunt     No Known Problems Maternal Uncle     Fibromyalgia Paternal Aunt     Lupus Paternal Aunt     No Known Problems Paternal Uncle     No Known Problems Brother     No Known Problems Sister     No Known Problems Son     No Known Problems Son     No Known Problems Son     No Known Problems Son      Review of patient's allergies indicates:  No Known Allergies    Medications:    Current Outpatient Medications:     alpelisib (PIQRAY) 300 mg/day (150 mg x 2) Tab, Take 2 tablets (300 mg) by mouth once daily., Disp: 60 tablet, Rfl: 3    ALPRAZolam (XANAX) 0.5 MG tablet, Take 1 tablet (0.5 mg total) by mouth 3 (three) times daily as needed for Insomnia or Anxiety., Disp: 60 tablet, Rfl: 0    amLODIPine (NORVASC) 10 MG tablet, TAKE 1 TABLET (10 MG) BY MOUTH EVERY DAY, Disp: 90 tablet, Rfl: 3    calcium-vitamin D3 (OYSTER SHELL CALCIUM-VIT D3) 500 mg-5 mcg (200 unit) per tablet, Take 1 tablet by mouth 2 (two) times daily., Disp: 180 tablet, Rfl: 3    diazePAM (VALIUM) 5 MG tablet, Take 1 tablet (5 mg total) by mouth every 12 (twelve) hours as needed (muscle spasm)., Disp: 10 tablet, Rfl: 0    diphenoxylate-atropine 2.5-0.025 mg (LOMOTIL) 2.5-0.025 mg per tablet, Take 1 tablet by mouth 4 (four) times daily as needed for Diarrhea., Disp: 60 tablet, Rfl: 2    dulaglutide (TRULICITY) 1.5 mg/0.5 mL pen injector, Inject 1.5 mg into the skin once a week., Disp: 4 pen, Rfl: 2    FLUoxetine 20 MG capsule, Take 2 capsules (40 mg total) by mouth once  "daily., Disp: 90 capsule, Rfl: 1    FLUoxetine 40 MG capsule, Take 2 capsules (80 mg total) by mouth once daily., Disp: 60 capsule, Rfl: 1    gabapentin (NEURONTIN) 300 MG capsule, Take 1 capsule (300 mg total) by mouth 3 (three) times daily., Disp: 90 capsule, Rfl: 11    hydroCHLOROthiazide (HYDRODIURIL) 25 MG tablet, TAKE 1 TABLET BY MOUTH ONCE DAILY, Disp: 90 tablet, Rfl: 3    HYDROcodone-acetaminophen (NORCO)  mg per tablet, Take 1 tablet by mouth every 6 (six) hours as needed for Pain., Disp: 90 tablet, Rfl: 0    ibuprofen (ADVIL,MOTRIN) 600 MG tablet, Take 1 tablet (600 mg total) by mouth every 8 (eight) hours as needed for Pain., Disp: 20 tablet, Rfl: 0    insulin detemir U-100 (LEVEMIR FLEXTOUCH U-100 INSULN) 100 unit/mL (3 mL) InPn pen, Inject 21 Units into the skin every evening., Disp: 6 mL, Rfl: 2    insulin lispro (HUMALOG KWIKPEN INSULIN) 100 unit/mL pen, Inject 5 Units into the skin 3 (three) times daily., Disp: 6 mL, Rfl: 11    lancets 33 gauge Misc, 1 lancet by Misc.(Non-Drug; Combo Route) route once daily., Disp: 100 each, Rfl: 3    lancing device Misc, 1 Device by Misc.(Non-Drug; Combo Route) route 2 (two) times daily with meals., Disp: 1 each, Rfl: 0    LIDOcaine-prilocaine (EMLA) cream, Apply topically as needed., Disp: 30 g, Rfl: 0    losartan (COZAAR) 50 MG tablet, Take 2 tablets by mouth once daily, Disp: 180 tablet, Rfl: 3    metFORMIN (GLUCOPHAGE) 500 MG tablet, Take 2 tablets (1,000 mg total) by mouth 2 (two) times daily with meals. Needs appointment for future refills, Disp: 120 tablet, Rfl: 2    oxyCODONE (ROXICODONE) 5 MG immediate release tablet, Take 1 tablet (5 mg total) by mouth every 4 (four) hours as needed for Pain., Disp: 5 tablet, Rfl: 0    pen needle, diabetic (BD ULTRA-FINE TANESHA PEN NEEDLE) 32 gauge x 5/32" Ndle, Use as directed with novolog and levemir, Disp: 100 each, Rfl: 5    tadalafiL (CIALIS) 5 MG tablet, Take 2 tablets (10 mg total) by mouth daily as needed for " Erectile Dysfunction., Disp: 20 tablet, Rfl: 1    traZODone (DESYREL) 50 MG tablet, Take 1 tablet (50 mg total) by mouth every evening., Disp: 30 tablet, Rfl: 0  No current facility-administered medications for this visit.    Facility-Administered Medications Ordered in Other Visits:     0.9%  NaCl infusion, , Intravenous, Continuous, Rosa Linn MD, Stopped at 11/19/21 1634    alteplase injection 2 mg, 2 mg, Intra-Catheter, PRN, Rosa Linn MD    heparin, porcine (PF) 100 unit/mL injection flush 500 Units, 500 Units, Intravenous, 1 time in Clinic/HOD, Richa Bahena MD    heparin, porcine (PF) 100 unit/mL injection flush 500 Units, 500 Units, Intravenous, PRN, Rosa Linn MD    heparin, porcine (PF) 100 unit/mL injection flush 500 Units, 500 Units, Intravenous, PRN, Rosa Linn MD, 500 Units at 11/19/21 1635    sodium chloride 0.9% 250 mL flush bag, , Intravenous, 1 time in Clinic/HOD, Rosa Linn MD    sodium chloride 0.9% flush 10 mL, 10 mL, Intravenous, 1 time in Clinic/HOD, Richa Bahena MD    sodium chloride 0.9% flush 10 mL, 10 mL, Intravenous, PRN, Rosa Linn MD, 10 mL at 11/19/21 1501    sodium chloride 0.9% flush 10 mL, 10 mL, Intravenous, PRN, Rosa Linn MD, 10 mL at 11/19/21 1635    OBJECTIVE:       ROS:  Review of Systems   Constitutional:  Positive for activity change and fatigue (improving).   HENT: Negative.     Eyes: Negative.    Respiratory: Negative.     Cardiovascular: Negative.    Gastrointestinal:  Positive for diarrhea (improved). Negative for abdominal pain and nausea.   Genitourinary: Negative.    Musculoskeletal:  Positive for myalgias.        Right arm lymphedema   Skin: Negative.    Neurological:         + neuropathic pain in bilateral lower extremities   Psychiatric/Behavioral:  Positive for sleep disturbance. Negative for dysphoric mood, self-injury and suicidal ideas. The patient is not nervous/anxious.    All other systems reviewed and are  negative.    Review of Symptoms      Symptom Assessment (ESAS 0-10 Scale)  Pain:  0  Dyspnea:  0  Anxiety:  0  Nausea:  0  Depression:  0  Anorexia:  0  Fatigue:  0  Insomnia:  0  Restlessness:  0  Agitation:  0     CAM / Delirium:  Negative  Constipation:  Negative  Diarrhea:  Positive    Anxiety:  Is not nervous/anxious    Bowel Management Plan (BMP):  Yes      Pain Assessment:  OME in 24 hours:  0-15  Location(s): leg    Leg       Location: bilateral        Quality: Tingling        Quantity: 0/10 in intensity month(s) ago, unchanged        Aggravating Factors: None        Alleviating Factors: Opiates        Associated Symptoms: None    Modified Mikal Scale:  0    ECOG Performance Status ndGndrndanddndend:nd nd2nd Living Arrangements:  Lives with spouse    Psychosocial/Cultural: Patient lives with his wife. He has three sons (one set of twins)    Spiritual:  F - Mariella and Belief:  Yes  I - Importance:  High  A - Address in Care:  Yes    Advance Care Planning   Advance Directives:   Living Will: No    LaPOST: No    Do Not Resuscitate Status: No    Medical Power of : No    Agent's Name:  Efraín Li   Agent's Contact Number:  [phone]    Decision Making:  Patient answered questions  Goals of Care: What is most important right now is to focus on symptom/pain control. Accordingly, we have decided that the best plan to meet the patient's goals includes continuing with treatment.        Physical Exam:  Vitals:     Vitals:    12/15/22 1100   BP: 126/67   Pulse: 64     Physical Exam  Vitals reviewed.   Constitutional:       General: He is not in acute distress.     Appearance: He is not ill-appearing.   HENT:      Head: Normocephalic and atraumatic.      Right Ear: External ear normal.      Left Ear: External ear normal.   Eyes:      General: No scleral icterus.        Right eye: No discharge.         Left eye: No discharge.   Neck:      Comments: Trachea midline  Pulmonary:      Effort: Pulmonary effort is normal. No  "respiratory distress.   Abdominal:      General: Abdomen is flat. There is no distension.   Musculoskeletal:         General: Swelling (right arm lymphedema) present. No deformity or signs of injury.      Cervical back: Normal range of motion.   Skin:     Coloration: Skin is not pale.      Findings: No lesion or rash.   Neurological:      Mental Status: He is alert and oriented to person, place, and time.      Gait: Gait normal.   Psychiatric:         Attention and Perception: Attention normal.         Mood and Affect: Mood and affect normal.         Speech: Speech normal.         Cognition and Memory: Cognition normal.         Judgment: Judgment normal.       Labs:  CBC:   WBC   Date Value Ref Range Status   12/07/2022 3.30 (L) 3.90 - 12.70 K/uL Final     Hemoglobin   Date Value Ref Range Status   12/07/2022 10.2 (L) 14.0 - 18.0 g/dL Final     POC Hematocrit   Date Value Ref Range Status   11/07/2022 35 (L) 36 - 54 %PCV Final     Hematocrit   Date Value Ref Range Status   12/07/2022 31.8 (L) 40.0 - 54.0 % Final     MCV   Date Value Ref Range Status   12/07/2022 78 (L) 82 - 98 fL Final     Platelets   Date Value Ref Range Status   12/07/2022 232 150 - 450 K/uL Final       LFT:   Lab Results   Component Value Date    AST 15 12/07/2022    ALKPHOS 85 12/07/2022    BILITOT 0.5 12/07/2022       Albumin:   Albumin   Date Value Ref Range Status   12/07/2022 3.4 (L) 3.5 - 5.2 g/dL Final     Protein:   Total Protein   Date Value Ref Range Status   12/07/2022 6.6 6.0 - 8.4 g/dL Final       Radiology:I have reviewed all pertinent imaging results/findings within the past 24 hours.    11/04/2022 NM PET CT: "In this patient with breast cancer, there are postoperative changes of right mastectomy and axillary derick dissection.  No evidence of residual/recurrent disease. New multifocal uptake in the axial and appendicular skeleton against a background of probable bone marrow hyperplasia.  Some of the foci are associated with " "sclerosis and others have no CT correlates.  Differential considerations include active osteoblastic metastasis, osteoblastic response/healing of prior metastasis, and/or new, early CT-occult metastasis.  Tissue sampling would be required for definitive diagnosis."    41 minutes of total time spent on the encounter, which includes face to face time and non-face to face time preparing to see the patient (eg, review of tests), Obtaining and/or reviewing separately obtained history, Documenting clinical information in the electronic or other health record, Independently interpreting results (not separately reported) and communicating results to the patient/family/caregiver, or Care coordination (not separately reported).       Signature: Angeles Rodriguez MD                  "

## 2022-12-15 NOTE — Clinical Note
Good afternoon,   Patient was in very good spirits today. I increased his trazodone and sending him back to sleep medicine. He seemed to take well to IOP. I encouraged him to reach back out to see if there was an opening for continued group therapy.   Thanks, Angeles

## 2022-12-20 ENCOUNTER — INFUSION (OUTPATIENT)
Dept: INFUSION THERAPY | Facility: HOSPITAL | Age: 47
End: 2022-12-20
Payer: MEDICARE

## 2022-12-20 ENCOUNTER — OFFICE VISIT (OUTPATIENT)
Dept: HEMATOLOGY/ONCOLOGY | Facility: CLINIC | Age: 47
End: 2022-12-20
Payer: MEDICARE

## 2022-12-20 VITALS
OXYGEN SATURATION: 97 % | WEIGHT: 315 LBS | DIASTOLIC BLOOD PRESSURE: 66 MMHG | HEART RATE: 97 BPM | RESPIRATION RATE: 18 BRPM | SYSTOLIC BLOOD PRESSURE: 137 MMHG | HEIGHT: 74 IN | BODY MASS INDEX: 40.43 KG/M2 | TEMPERATURE: 98 F

## 2022-12-20 VITALS
TEMPERATURE: 98 F | DIASTOLIC BLOOD PRESSURE: 65 MMHG | RESPIRATION RATE: 18 BRPM | SYSTOLIC BLOOD PRESSURE: 130 MMHG | OXYGEN SATURATION: 96 % | HEART RATE: 66 BPM

## 2022-12-20 DIAGNOSIS — D70.1 CHEMOTHERAPY-INDUCED NEUTROPENIA: ICD-10-CM

## 2022-12-20 DIAGNOSIS — Z17.1 MALIGNANT NEOPLASM INVOLVING BOTH NIPPLE AND AREOLA OF RIGHT BREAST IN MALE, ESTROGEN RECEPTOR NEGATIVE: Primary | ICD-10-CM

## 2022-12-20 DIAGNOSIS — I10 ESSENTIAL HYPERTENSION: ICD-10-CM

## 2022-12-20 DIAGNOSIS — C79.51 BONE METASTASES: ICD-10-CM

## 2022-12-20 DIAGNOSIS — E11.65 UNCONTROLLED TYPE 2 DIABETES MELLITUS WITH HYPERGLYCEMIA: ICD-10-CM

## 2022-12-20 DIAGNOSIS — C50.021 MALIGNANT NEOPLASM INVOLVING BOTH NIPPLE AND AREOLA OF RIGHT BREAST IN MALE, ESTROGEN RECEPTOR NEGATIVE: Primary | ICD-10-CM

## 2022-12-20 DIAGNOSIS — E11.9 TYPE 2 DIABETES MELLITUS WITHOUT COMPLICATION, WITHOUT LONG-TERM CURRENT USE OF INSULIN: ICD-10-CM

## 2022-12-20 DIAGNOSIS — T45.1X5A CHEMOTHERAPY-INDUCED NEUTROPENIA: ICD-10-CM

## 2022-12-20 DIAGNOSIS — E66.01 CLASS 3 SEVERE OBESITY DUE TO EXCESS CALORIES WITH SERIOUS COMORBIDITY AND BODY MASS INDEX (BMI) OF 50.0 TO 59.9 IN ADULT: ICD-10-CM

## 2022-12-20 PROCEDURE — 25000003 PHARM REV CODE 250: Performed by: INTERNAL MEDICINE

## 2022-12-20 PROCEDURE — 3044F HG A1C LEVEL LT 7.0%: CPT | Mod: CPTII,S$GLB,, | Performed by: NURSE PRACTITIONER

## 2022-12-20 PROCEDURE — 3075F SYST BP GE 130 - 139MM HG: CPT | Mod: CPTII,S$GLB,, | Performed by: NURSE PRACTITIONER

## 2022-12-20 PROCEDURE — 1159F PR MEDICATION LIST DOCUMENTED IN MEDICAL RECORD: ICD-10-PCS | Mod: CPTII,S$GLB,, | Performed by: NURSE PRACTITIONER

## 2022-12-20 PROCEDURE — 99999 PR PBB SHADOW E&M-EST. PATIENT-LVL V: CPT | Mod: PBBFAC,,, | Performed by: NURSE PRACTITIONER

## 2022-12-20 PROCEDURE — 4010F PR ACE/ARB THEARPY RXD/TAKEN: ICD-10-PCS | Mod: CPTII,S$GLB,, | Performed by: NURSE PRACTITIONER

## 2022-12-20 PROCEDURE — A4216 STERILE WATER/SALINE, 10 ML: HCPCS | Performed by: INTERNAL MEDICINE

## 2022-12-20 PROCEDURE — 3008F BODY MASS INDEX DOCD: CPT | Mod: CPTII,S$GLB,, | Performed by: NURSE PRACTITIONER

## 2022-12-20 PROCEDURE — 96365 THER/PROPH/DIAG IV INF INIT: CPT

## 2022-12-20 PROCEDURE — 3078F DIAST BP <80 MM HG: CPT | Mod: CPTII,S$GLB,, | Performed by: NURSE PRACTITIONER

## 2022-12-20 PROCEDURE — 99215 OFFICE O/P EST HI 40 MIN: CPT | Mod: S$GLB,,, | Performed by: NURSE PRACTITIONER

## 2022-12-20 PROCEDURE — 1159F MED LIST DOCD IN RCRD: CPT | Mod: CPTII,S$GLB,, | Performed by: NURSE PRACTITIONER

## 2022-12-20 PROCEDURE — 25000003 PHARM REV CODE 250: Performed by: NURSE PRACTITIONER

## 2022-12-20 PROCEDURE — 4010F ACE/ARB THERAPY RXD/TAKEN: CPT | Mod: CPTII,S$GLB,, | Performed by: NURSE PRACTITIONER

## 2022-12-20 PROCEDURE — 3008F PR BODY MASS INDEX (BMI) DOCUMENTED: ICD-10-PCS | Mod: CPTII,S$GLB,, | Performed by: NURSE PRACTITIONER

## 2022-12-20 PROCEDURE — 99999 PR PBB SHADOW E&M-EST. PATIENT-LVL V: ICD-10-PCS | Mod: PBBFAC,,, | Performed by: NURSE PRACTITIONER

## 2022-12-20 PROCEDURE — 96413 CHEMO IV INFUSION 1 HR: CPT

## 2022-12-20 PROCEDURE — 1160F RVW MEDS BY RX/DR IN RCRD: CPT | Mod: CPTII,S$GLB,, | Performed by: NURSE PRACTITIONER

## 2022-12-20 PROCEDURE — 96375 TX/PRO/DX INJ NEW DRUG ADDON: CPT

## 2022-12-20 PROCEDURE — 1160F PR REVIEW ALL MEDS BY PRESCRIBER/CLIN PHARMACIST DOCUMENTED: ICD-10-PCS | Mod: CPTII,S$GLB,, | Performed by: NURSE PRACTITIONER

## 2022-12-20 PROCEDURE — 3075F PR MOST RECENT SYSTOLIC BLOOD PRESS GE 130-139MM HG: ICD-10-PCS | Mod: CPTII,S$GLB,, | Performed by: NURSE PRACTITIONER

## 2022-12-20 PROCEDURE — 3044F PR MOST RECENT HEMOGLOBIN A1C LEVEL <7.0%: ICD-10-PCS | Mod: CPTII,S$GLB,, | Performed by: NURSE PRACTITIONER

## 2022-12-20 PROCEDURE — 3078F PR MOST RECENT DIASTOLIC BLOOD PRESSURE < 80 MM HG: ICD-10-PCS | Mod: CPTII,S$GLB,, | Performed by: NURSE PRACTITIONER

## 2022-12-20 PROCEDURE — 63600175 PHARM REV CODE 636 W HCPCS: Performed by: INTERNAL MEDICINE

## 2022-12-20 PROCEDURE — 63600175 PHARM REV CODE 636 W HCPCS: Mod: JG | Performed by: NURSE PRACTITIONER

## 2022-12-20 PROCEDURE — 99215 PR OFFICE/OUTPT VISIT, EST, LEVL V, 40-54 MIN: ICD-10-PCS | Mod: S$GLB,,, | Performed by: NURSE PRACTITIONER

## 2022-12-20 RX ORDER — HEPARIN 100 UNIT/ML
500 SYRINGE INTRAVENOUS
Status: CANCELLED | OUTPATIENT
Start: 2022-12-27

## 2022-12-20 RX ORDER — SODIUM CHLORIDE 9 MG/ML
INJECTION, SOLUTION INTRAVENOUS CONTINUOUS
Status: DISCONTINUED | OUTPATIENT
Start: 2022-12-20 | End: 2022-12-20 | Stop reason: HOSPADM

## 2022-12-20 RX ORDER — SODIUM CHLORIDE 9 MG/ML
INJECTION, SOLUTION INTRAVENOUS CONTINUOUS
Status: CANCELLED | OUTPATIENT
Start: 2022-12-20

## 2022-12-20 RX ORDER — HEPARIN 100 UNIT/ML
500 SYRINGE INTRAVENOUS
Status: CANCELLED | OUTPATIENT
Start: 2022-12-20

## 2022-12-20 RX ORDER — SODIUM CHLORIDE 0.9 % (FLUSH) 0.9 %
10 SYRINGE (ML) INJECTION
Status: CANCELLED | OUTPATIENT
Start: 2022-12-20

## 2022-12-20 RX ORDER — FERROUS SULFATE, DRIED 160(50) MG
1 TABLET, EXTENDED RELEASE ORAL 2 TIMES DAILY
Qty: 180 TABLET | Refills: 3 | Status: SHIPPED | OUTPATIENT
Start: 2022-12-20 | End: 2023-12-21 | Stop reason: SDUPTHER

## 2022-12-20 RX ORDER — SODIUM CHLORIDE 0.9 % (FLUSH) 0.9 %
10 SYRINGE (ML) INJECTION
Status: DISCONTINUED | OUTPATIENT
Start: 2022-12-20 | End: 2022-12-20 | Stop reason: HOSPADM

## 2022-12-20 RX ORDER — ZOLEDRONIC ACID 0.04 MG/ML
4 INJECTION, SOLUTION INTRAVENOUS
Status: COMPLETED | OUTPATIENT
Start: 2022-12-20 | End: 2022-12-20

## 2022-12-20 RX ORDER — ZOLEDRONIC ACID 0.04 MG/ML
4 INJECTION, SOLUTION INTRAVENOUS
Status: CANCELLED | OUTPATIENT
Start: 2022-12-27

## 2022-12-20 RX ORDER — SODIUM CHLORIDE 0.9 % (FLUSH) 0.9 %
10 SYRINGE (ML) INJECTION
Status: CANCELLED | OUTPATIENT
Start: 2022-12-27

## 2022-12-20 RX ORDER — HEPARIN 100 UNIT/ML
500 SYRINGE INTRAVENOUS
Status: DISCONTINUED | OUTPATIENT
Start: 2022-12-20 | End: 2022-12-20 | Stop reason: HOSPADM

## 2022-12-20 RX ORDER — METFORMIN HYDROCHLORIDE 500 MG/1
1000 TABLET ORAL 2 TIMES DAILY WITH MEALS
Qty: 120 TABLET | Refills: 2 | Status: SHIPPED | OUTPATIENT
Start: 2022-12-20 | End: 2023-07-14 | Stop reason: SDUPTHER

## 2022-12-20 RX ADMIN — Medication 10 ML: at 12:12

## 2022-12-20 RX ADMIN — ZOLEDRONIC ACID 4 MG: 0.04 INJECTION, SOLUTION INTRAVENOUS at 12:12

## 2022-12-20 RX ADMIN — SODIUM CHLORIDE: 0.9 INJECTION, SOLUTION INTRAVENOUS at 10:12

## 2022-12-20 RX ADMIN — HEPARIN 500 UNITS: 100 SYRINGE at 12:12

## 2022-12-20 RX ADMIN — PACLITAXEL 220 MG: 100 INJECTION, POWDER, LYOPHILIZED, FOR SUSPENSION INTRAVENOUS at 11:12

## 2022-12-20 RX ADMIN — SODIUM CHLORIDE: 9 INJECTION, SOLUTION INTRAVENOUS at 09:12

## 2022-12-20 NOTE — PLAN OF CARE
1220-Pt tolerated Abraxane and Zometa infusions well, no complications or side effects, POC and meds discussed with pt, pt aware of upcoming appts, pt knows to call MD with any questions or concerns. Pt ambulated off unit, no distress noted.

## 2022-12-21 ENCOUNTER — CLINICAL SUPPORT (OUTPATIENT)
Dept: REHABILITATION | Facility: HOSPITAL | Age: 47
End: 2022-12-21
Payer: MEDICARE

## 2022-12-21 DIAGNOSIS — R29.3 POOR POSTURE: ICD-10-CM

## 2022-12-21 DIAGNOSIS — I89.0 LYMPHEDEMA OF RIGHT ARM: Primary | ICD-10-CM

## 2022-12-21 DIAGNOSIS — Z74.09 IMPAIRED FUNCTIONAL MOBILITY AND ACTIVITY TOLERANCE: ICD-10-CM

## 2022-12-21 PROCEDURE — 97110 THERAPEUTIC EXERCISES: CPT

## 2022-12-21 PROCEDURE — 97140 MANUAL THERAPY 1/> REGIONS: CPT

## 2022-12-21 NOTE — PROGRESS NOTES
"                                                    Physical Therapy Daily Treatment Note       Date: 12/21/2022   Name: Paul Li Jr.  Clinic Number: [id number]    Therapy Diagnosis:   Encounter Diagnoses   Name Primary?    Lymphedema of right arm Yes    Impaired functional mobility and activity tolerance     Poor posture      Physician: Rosa Linn MD    Physician Orders: PT Eval and Treat -RUE lymphedema  Medical Diagnosis: Malignant neoplasm involving both nipple and areola of right breast in male, estrogen receptor negative [C50.021, Z17.1], Lymphedema of right upper extremity   Evaluation Date: 10/10/2022  Authorization Period Expiration: 10/14/22  Plan of Care Certification Period: 1/6/23 (12 weeks)  Visit # / Visits authorized: 11/ 11 (plus evaluation)  Insurance: [employer]  FOTO: 2/3     Time In: 10:11 AM  Time Out: 11:05 AM  Total Billable Time: 54 minutes     Precautions: Standard, Fall, cancer, and Spine, Bony Mets, hx of Seizures, L chest wall port      Subjective     Pt reports: He's tired from chemo yesterday. He took bandages off on Monday. He notices that if he doesn't use tubigrip over the bandages, they tend to fall down. He has heard from [facility].  He was compliant with self manual lymph drainage  Response to previous treatment: his Right hand is smaller  Pain Scale: Paul rates pain on a scale of 0/10 on VAS.   Pain location: N/A    Fatigue: "feel fine"  Functional change: none stated  Treatment:   Chemotherapy: te-adjuvant chemo therapy completed 10/19  Pt is currently on chemotherapy: pt is getting 1 infusion/week for 3 weeks with 1 week break, and he takes oral chemotherapy daily.  Radiation: Completed in February 2020  Surgery date: 12/17/19 pt underwent right axillary lymph node dissection and resection excess lateral tissue; 11/22/19 right simple mastectomy with SLNB; total of 18 lymph nodes removed      Objective     Objective Measures updated at progress report " "unless specified.     LANDMARK RIGHT UE LEFT UE DIFF RUE Diff RUE Diff RUE Diff RUE Diff RUE Diff RUE Diff RUE Diff RUE Diff   Date Eval Eval Eval 10/17/22 10/17/22 10/29/22 10/19/22 11/2/22 11/2/22 11/11/22 11/11/22 11/29/22 11/29/22 12/5/22 12/5/22 12/13/22 12/13/22 12/21/22 12/21/22   E + 8" 50 cm 46.5 cm 3.5 cm 48 cm -2 47.5 cm -0.5 50.5 cm +3 47.5 cm -3 49.5 cm +2 50.5 cm +1 48.5 cm -2 49 cm +0.5   E + 6" 45.5 cm 44 cm 1.5 cm 45 cm -0.5 46.5 cm +1.5 47 cm +0.5 45 cm -2 45.5 cm +0.5 46 cm +0.5 45 cm -1 45.5 cm +0.5   E + 4" 40 cm 40.5 cm 0.5 cm 40.5 cm +0.5 40.5cm No chg 41 cm +0.5 40.5 cm -0.5 40.5 cm No chg 40 cm -0.5 41.5 cm +1.5  41 cm -0.5   E + 2" 38.5 cm 38.5 cm 0 cm 39 cm +0.5 40 cm +1 39.5 cm -0.5 38.5 cm -1 38 cm -0.5 39 cm +1 39 cm No chg 39 cm No chg   Elbow 36.5 cm 34 cm 2.5 cm 35.5 cm -1 35.5 cm No chg 36 cm +0.5 34.5 cm -1.5 36 cm +1.5 36 cm No chg 35 cm -1 36 cm +1   W+ 8" 37.5cm 33 cm 4.5 cm 38cm +0.5 36.5 cm -1.5 37.5 cm +1 36.5 cm -1 37.5 cm +1 37.5 cm No chg 37 cm -0.5 37 cm No chg   W +  6" 35 cm 29.5 cm 5.5 cm 34 cm -1 34.5 cm +0.5 33.5 cm -1 32.5 cm -1 33 cm +0.5 33 cm No chg 33cm No chg 34.5 cm +1.5   W + 4" 31.5 cm 25 cm 6.5 cm 31.5 cm No chg 31.5 cm No chg 29.5 cm -2 29 cm -0.5 30 cm +1 28 cm -2 29 cm +1 29 cm No chg   Wrist 23 cm 20 cm 3 cm 22.5cm -0.5 21.5 cm -1 23 cm +1.5 21 cm -2 24 cm +3 22 cm -2 22 cm No chg 22.5 cm +0.5   DPC 28.5 cm 26 cm 2.5 cm 28 cm -0.5 27.5cm -0.5 28.5 cm +1 27 cm -1.5 28 cm +1 27 cm -1 26.5 cm -0.5 28 cm +1.5   IP Thumb 8.5 cm 8 cm 0.5 cm 8.5 cm No chg 8.5 cm No chg 9 cm +0.5 8.5 cm -0.5 8.5 cm No chg 9 cm +0.5 8.5 cm -0.5 8.5 cm No chg            Treatment     Paul received individual therapeutic exercises to improve postural correction and alignment, stretching and soft tissue mobility, and strengthening for 14 minutes including the following:     Diaphragmatic breathing, 2 x 5 reps  Scapular retractions  2 x 10 reps  Shoulder rolls, 10 reps each fwd and " back  Head turns, 5 reps B  Head tilts, 5 reps B  Bicep curls, 2# dumbbells, 2 x 10 reps  Rows with red band, 2 x 10  Shoulder ext with red band, 2 x 10  Triceps ext with red band, 2 x 10        Paul received the following manual therapy techniques were performed to increased myofascial/soft tissue length, mobility and pliability, increase PROM, AROM and function as well as to decrease pain for 40minutes    Measurements taken above    Short Neck- held due to history of seizures  Clear Healthy Lymph Nodes:  Left Axilla                                                  Right Groin  Open Anastomoses:  Anterior Axillo-Axillary  (AAA)                                    Axillo-Inguinal     (AI)                                    Posterior Axillo-Axillary  (GEREMIAS)       Right Upper Arm (Lateral and Medial)  Right  Antecubital Fossa  Right Dorsal Forearm  Right Volar Forearm  Dorsum Right Hand  Right fingers     Rework Right UE  Rework Anastomoses  Rework Healthy lymph Nodes      PT applies  gauze to right fingers and hand and stockinette, cotton artiflex, and short stretch bandages to pt's RIGHT/LEFT/BILATERAL: right upper extremity. Pt educated to wear bandaging until next session unless it causes pain, numbness, or changes in skin color/temperature, or if they start to fall down.  If removed, pt instructed to roll up bandages and cotton padding and bring to next session. If bandages are removed, pt can wear his tubigrip.  Pt has Vet glove to cover bandages in the shower, and we reviewed directions on how to care for bandages. Pt verbalizes understanding of all topics.                Home Exercise Program and Patient Education   Education provided re:  - role of PT in multi - disciplinary team, goals for PT  - progress towards goals         Written Home Exercises Provided: Patient instructed to cont prior HEP.  Exercises were reviewed and Paul was able to demonstrate them prior to the end of the session.  Paul demonstrated  good  understanding of the education provided.     See EMR under Media for self lymphatic drainage provided prior visit.    Pt has no cultural, educational or language barriers to learning provided.    Assessment     Patient tolerated session well.  Pt has been cleared by referring physician to receive complete decongestive therapy. He has been compliant with exercise, elevation, self manual lymph drainage, and wearing compression bandages/tubigrip, yet lymphedema persists. Presents with mix of increase and decrease in his right arm today. He tolerated complete decongestive therapy well.  He was able to progress exercises without issue. Once his measurements stabilize, he will benefit from a compression sleeve and glove, and he will also benefit from a compression pump to manage his symptoms.      Pt prognosis is Good. Pt will continue to benefit from skilled outpatient physical therapy to address the deficits listed in the problem list chart on initial evaluation, provide pt/family education and to maximize pt's level of independence in the home and community environment.     Anticipated barriers to physical therapy: advanced disease    Goals as follows:  Short Term Goals (6 weeks)  1. Pt will demo decrease in girth in right upper extremity by >/= 2cm to allow for improved UE symmetry, clothing choice, ROM, and UE function. (progressing, not met)  2. Patient will demonstrate 100% knowledge of lymphedema precautions and signs of infection to allow for reduced risk of lymphedema, reduced risk of infection, and/or exacerbation.  (met)  3. Patient will perform self lymph drainage techniques to enhance lymphatic drainage and mobility.  ( met)  4. Patient will tolerate daily activities with multilayered bandaging to enhance lymphatic drainage and skin elasticity.  (met)  5. Pt will tolerate HEP for improved strength, functional mobility, ROM, posture, and endurance. (met)        Long Term Goals: ( 12  weeks)  1. Patient  to show decreased girth in right upper extremity by >/= 3cm to allow for improved UE symmetry, clothing choice, ROM, and UE function.  (progressing, not met)  2. Patient will show reduction in density to mild or less with improved contour of limb to allow for improved cosmesis, improved lymphatic drainage, and functional mobility.  (progressing, not met)  3. Patient to bruna/doff compression garment with daily compliance to support lymphatic mobility and skin elasticity.  (progressing, not met)  4. Pt to show improved postural awareness and alignment for improved functional mobility.  (progressing, not met)  5. Pt to be independent and compliant with HEP to allow for improved ROM, reach and carry with functional endurance and strength.    (progressing, not met)           Plan   Outpatient physical therapy 2x week for 12 weeks to include the following:   Manual Therapy, Neuromuscular Re-ed, Orthotic Management and Training, Patient Education, Self Care, Therapeutic Activities, and Therapeutic Exercise.     Plan of care Certification Period: 10/10/2022 to 1/6/23.       Therapist: Katelynn Harrell, PT  12/21/2022

## 2022-12-21 NOTE — PROGRESS NOTES
Goal Outcome Evaluation:    Patient A&Ox4. VSS. CMS intact. Lower back incision DCI. C/o back, pain, cramps on the bilateral ankles when moving Oxycodone given x1 at 2240. PIV SL with intermittent abx. Up assist of 1 GB/W. Still experiencing loose stool so hold stool softeners. US LWR EXT VENOUS DUPLEX BILAT done on this shift.  Dacosta in place and patent. Will continue to monitor.          Subjective:       Patient ID: Paul Li Jr. is a 46 y.o. male.      Chief Complaint: Metastatic breast cancer, negative genetic testing    Returns for follow up and C8D1 Abraxane and Aplelisib.    Overall doing well. Feels good today.   [place]aling a mile a day.   Pain well controlled   No other pain issues.   Right arm lymphedema comes and goes.   Appetite good and weight stable.   Energy level is good  No fevers, chills, or infectious complaints  No CP/SOB/swelling.         Most recent Imagin/15/2022 PET Scan:  FINDINGS:  Quality of the study: Adequate.  In the head and neck, there are no hypermetabolic lesions worrisome for malignancy. There are no hypermetabolic mucosal lesions, and there are no pathologically enlarged or hypermetabolic lymph nodes.  In the chest, there are no hypermetabolic lesions worrisome for malignancy.  There are no pathologically enlarged or hypermetabolic lymph nodes.  Stable right lung pulmonary micronodules measuring 0.4 cm (series 2, image 83), below the size threshold for PET. No new pulmonary nodule or mass.  Postoperative changes of right mastectomy and axillary dissection.  In the abdomen and pelvis, there is physiologic tracer distribution within the abdominal organs and excretion into the genitourinary system.  In the bones, there is normalized uptake within prior hypermetabolic osseous lesions which demonstrate progressed sclerosis on CT.  Prominent sclerosis involving the T12 vertebral body, sacrum, and iliac bone.  Mild uptake within the left pubic symphysis without correlate lesion on CT, potentially representing physiologic marrow uptake.  In the extremities, there are no hypermetabolic lesions worrisome for malignancy.  Incidental CT findings: Sinus disease.  Left-sided chest port with catheter tip terminating in the SVC.  Diffuse hypoattenuation of the liver compatible with hepatic steatosis.  Impression:  Favorable response to therapy with normalized uptake and  increased sclerosis of osseous lesions.  No new hypermetabolic tumor.  Stable right lung pulmonary micronodules, below the size threshold for PET.  Attention on follow-up.    Diagnosis:  Metastatic TNBC progressed (prior Stage IB (X1cT8C8) triple negative invasive ductal carcinoma with lobular features of right breast, retroareolar, Grade 2, Ki67 80%, diagnosed 2019)    Oncology History:  Metastatic  Set up for scans (due to back pain) which are consistent for recurrence.   Imagin/3/2021 MRI T/L spine:  FINDINGS:  Alignment: Within normal limits.  Vertebrae: Low T1 signal intensity in the left T12 vertebral body extending to the left pedicle, S1 and S2 vertebral bodies with abnormal enhancement.  The vertebral heights are well maintained.  No evidence of acute fractures.  Abnormal appearance of the LEFT sacrum and ilium as well.  Discs: Degenerative disease of the level of L4-L5 with posterior annular fissure.  Conus appears within normal limits terminating at the level of the L2. level.  Cauda equina appears unremarkable.  Mild circumferential disc bulging at the level of T10-T11 abutting the ventral thecal sac.  No significant spinal canal stenosis or neural foraminal narrowing throughout the thoracic spine.  No significant spinal canal stenosis or neural foraminal narrowing throughout the lumbar spine.  Paraspinous muscles and soft tissues: Subcentimeter focal enhancement in the posterior column along the supraspinous ligament at the level of L1-L2 (sagittal series 21, image 10) potentially inflammatory versus neoplastic.  Impression:  Abnormal appearance of the T12, S1, S2 vertebral bodies as well as LEFT sacrum and ilium concerning for metastatic disease in this patient with history of breast cancer.  No evidence for significant central canal narrowing.  Additional findings, as above.  This report was flagged in Epic as abnormal.    2021 PET scan:  COMPARISON:  MRI thoracic and lumbar spine  06/03/2021  FINDINGS:  Quality of the study: Adequate.  In the head and neck, there are no hypermetabolic lesions worrisome for malignancy. There are no hypermetabolic mucosal lesions, and there are no pathologically enlarged or hypermetabolic lymph nodes.  In the chest, there is postoperative change of right mastectomy/right axillary lymph node dissection.  There is low level hypermetabolic activity with mild soft tissue thickening in the skin/superficial subcutaneous fat of the right chest wall (axial fused image 78) with SUV max 3.6.  There is soft tissue thickening measuring approximately 1.8 x 7.9 cm in the subcutaneous fat of the right chest wall (axial series 3, image 84) with SUV max 3.1.  There are a few bilateral pulmonary nodules measuring up to 0.3 cm in the left lung (axial series 3, image 88) and 0.5 cm in the right lung (axial series 3, image 84).  These nodules are too small to characterize by PET.  There are no pathologically enlarged or hypermetabolic lymph nodes.  In the abdomen and pelvis, there is physiologic tracer distribution within the abdominal organs and excretion into the genitourinary system.  Subtle sen mesentery and multiple prominent mesenteric lymph nodes measuring up to 1.8 cm in long axis with background activity.  In the bones, there are several hypermetabolic lesions worrisome for malignancy.  Sclerotic lesion in the left T12 vertebral body (axial series 3, image 131) with SUV max 12.  Sclerotic lesions in the sacrum (for example axial series 3, image 188) with SUV max 9.7.  Sclerotic lesions in the left iliac bone (for example axial series 3, image 205) with SUV max 6.  In the extremities, there are no hypermetabolic lesions worrisome for malignancy.  Incidental findings:  Bilateral maxillary antra mucous retention cysts.  Fat containing right inguinal hernia.  Impression:  1. In this patient with right breast carcinoma status post mastectomy/right axillary lymph node  dissection, there are multiple sclerotic lesions in the T12 vertebral body, sacrum, and left iliac bone with hypermetabolic activity concerning for active metastatic disease and in keeping with findings on MRI 06/03/2021.  2. Additionally there is low-level hypermetabolic activity with soft tissue thickening in the right chest wall as detailed above.  These findings are nonspecific and may be related to postoperative/post radiation change, with recurrent malignancy not excluded.  3. Nonspecific mesenteric haziness and lymph nodes which may indicate mesenteric adenitis.  Recommend clinical correlation and attention on follow-up.  4. Additional findings as above.  I, Sohan Tillman MD, attest that I reviewed and interpreted the images.    In summary,   Started aplelisib 8/22/2021   Abraxane C1 started 8/13/2021  We had sent Guardant and discussed results with Molecular tumor board  He also had a repeat bx to confirm metastatic triple negative breast cancer  Plan:   Nab-Paclitaxel and Alpelisib  Reference: https://ascopubs.org/doi/abs/10.1200/JCO.[email or web address]_suppl.1018  Also on Zometa-     - C1D1 Abraxane 8/13/2021  - Started aplelisib 8/22/2021               Oncology History   Malignant neoplasm involving both nipple and areola of right breast in male, estrogen receptor negative   5/1/2019 Imaging Significant Findings     Mammogram and US  Impression:  Right  Mass: Right breast 14 mm mass at the retroareolar anterior position. Assessment: 4 - Suspicious finding. Biopsy is recommended.   Left  There is no mammographic or sonographic evidence of malignancy.  Recommendation:  Biopsy is recommended.      5/2/2019 Biopsy     BREAST, RIGHT, RETROAREOLAR MASS, ULTRASOUND-GUIDED BIOPSY:  Invasive ductal carcinoma with lobular features.  Size of invasive carcinoma: 5 MM in greatest linear dimension within a single      5/2/2019 Breast Tumor Markers     Estrogen: Negative  Progesterone: Negative  HER2: Negative  Ki67: 80       5/17/2019 Cancer Staged     Staging form: Breast, AJCC 8th Edition  - Clinical stage from 5/17/2019: Stage IB (cT1c, cN0, cM0, G2, ER-, TX-, HER2-) - Signed by Richa Bahena MD on 5/17/2019 5/27/2019 - 7/21/2019 Chemotherapy     Treatment Summary   Plan Name: OP BREAST DOSE-DENSE AC - DOXORUBICIN CYCLOPHOSPHAMIDE Q2W  Treatment Goal: Curative  Status: Inactive  Start Date: 5/27/2019  End Date: 7/9/2019  Provider: Richa Bahena MD  Chemotherapy: DOXOrubicin chemo injection 186 mg, 60 mg/m2 = 186 mg, Intravenous, Clinic/HOD 1 time, 4 of 4 cycles  Administration: 186 mg (5/27/2019), 186 mg (6/10/2019), 186 mg (6/24/2019), 186 mg (7/8/2019)  cyclophosphamide (CYTOXAN) 600 mg/m2 = 1,865 mg in sodium chloride 0.9% 250 mL chemo infusion, 600 mg/m2 = 1,865 mg, Intravenous, Clinic/HOD 1 time, 4 of 4 cycles  Administration: 1,865 mg (5/27/2019), 1,865 mg (6/10/2019), 1,865 mg (6/24/2019), 1,865 mg (7/8/2019)      7/22/2019 - 10/15/2019 Chemotherapy     Treatment Summary   Plan Name: OP PACLITAXEL QW  Treatment Goal: Curative  Status: Inactive  Start Date: 7/24/2019  End Date: 10/8/2019  Provider: Richa Bahena MD  Chemotherapy: PACLitaxel (TAXOL) 80 mg/m2 = 246 mg in sodium chloride 0.9% 250 mL chemo infusion, 80 mg/m2 = 246 mg, Intravenous, Clinic/HOD 1 time, 12 of 12 cycles  Dose modification: 60 mg/m2 (original dose 80 mg/m2, Cycle 3), 55 mg/m2 (original dose 80 mg/m2, Cycle 7), 60 mg/m2 (original dose 80 mg/m2, Cycle 7), 50 mg/m2 (original dose 80 mg/m2, Cycle 9), 50 mg/m2 (original dose 80 mg/m2, Cycle 10)  Administration: 246 mg (7/24/2019), 246 mg (7/31/2019), 186 mg (8/7/2019), 186 mg (8/14/2019), 186 mg (8/21/2019), 186 mg (8/28/2019), 186 mg (9/4/2019), 174 mg (9/11/2019), 156 mg (9/18/2019), 156 mg (9/25/2019), 156 mg (10/1/2019), 156 mg (10/8/2019)      11/22/2019 Breast Surgery     On 11/22/19 Dr milan performed a right breast mastectomy with SLN biopsy with pathology showing grade 2, 6 mm IDC  with extensive treatment effect, no LVI with 1 LN positive 4 mm, negative margins. The on 12/17/19, Dr Whyte performed right axillary dissection with pathology still pending.      11/22/2019 Cancer Staged     ypT1b N1      12/17/2019 Breast Surgery     Surgery: Dr Shima Whyte, [phone] performed right ALND with pathology showing 14 negative lymph nodes.             1/14/2020 Tumor Conference      Post mastectomy radiation therapy: Xeloda, then possible Keytruda trial .      2/3/2020 - 3/23/2020 Radiation Therapy     Treating physician: Speedy Nair MD   Total Dose: 50.4 Gy to the chest wall and regional lymphatics  10 Gy boost to the prostate.   Fractions: 28 fractions at 1.8 Gy per fraction  5 fractions at 2 Gy per fraction       2/28/2020 -  Chemotherapy     * 4/15/2020 - 9/28/20 completed 6 cycles adjuvant Xeloda 1000 mg/m2 bid days 1-14 every 21 days  Treatment Summary   Plan Name: OP CAPECITABINE 1250MG/M2 BID Q3W  Treatment Goal: Curative  Status: Active  Start Date: 3/20/2020  End Date: 3/20/2020  Provider: Richa Bahena MD  Chemotherapy: [No matching medication found in this treatment plan]             Review of Systems   Constitutional:        See above   All other systems reviewed and are negative.        Objective:      Physical Exam  Vitals and nursing note reviewed.   Constitutional:       General: He is not in acute distress.     Appearance: Normal appearance. He is well-developed. He is obese. He is not diaphoretic.      Comments: Presents alone  Pleasant  ECOG= 0   HENT:      Head: Normocephalic and atraumatic.   Eyes:      General: No scleral icterus.     Extraocular Movements: Extraocular movements intact.      Conjunctiva/sclera: Conjunctivae normal.      Pupils: Pupils are equal, round, and reactive to light.   Neck:      Thyroid: No thyromegaly.      Trachea: No tracheal deviation.   Cardiovascular:      Rate and Rhythm: Normal rate and regular rhythm.      Heart sounds: Normal  heart sounds. No murmur heard.    No friction rub. No gallop.   Pulmonary:      Effort: Pulmonary effort is normal. No respiratory distress.      Breath sounds: Normal breath sounds. No wheezing, rhonchi or rales.   Chest:      Chest wall: No tenderness.   Abdominal:      General: Bowel sounds are normal. There is no distension.      Palpations: Abdomen is soft. There is no mass.      Tenderness: There is no abdominal tenderness. There is no guarding or rebound.      Comments: No organomegaly   Musculoskeletal:         General: Swelling (mild right arm lymphedema. ) present. No tenderness or deformity. Normal range of motion.      Cervical back: Normal range of motion and neck supple. No rigidity or tenderness.      Right lower leg: Edema (trace, chronic) present.      Left lower leg: Edema (trace, chronic) present.      Comments: No spinal or paraspinal tenderness.   Lymphadenopathy:      Cervical: No cervical adenopathy.   Skin:     General: Skin is warm and dry.      Coloration: Skin is not jaundiced or pale.      Findings: No erythema or rash.   Neurological:      General: No focal deficit present.      Mental Status: He is alert and oriented to person, place, and time.      Cranial Nerves: No cranial nerve deficit.      Sensory: No sensory deficit.      Motor: No weakness or abnormal muscle tone.      Coordination: Coordination normal.      Gait: Gait normal.      Deep Tendon Reflexes: Reflexes normal.   Psychiatric:         Mood and Affect: Mood normal.         Behavior: Behavior normal.         Thought Content: Thought content normal.         Judgment: Judgment normal.       Labs- reviewed  Assessment:       1. Malignant neoplasm involving both nipple and areola of right breast in male, estrogen receptor negative  CBC Oncology    Comprehensive Metabolic Panel    Iron and TIBC    Ferritin    Vitamin B12    TSH    T4, Free   2. Bone metastases  CBC Oncology    Comprehensive Metabolic Panel    Iron and TIBC     Ferritin    Vitamin B12    TSH    T4, Free   3. 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 Essential hypertension     6. Neoplasm related pain     7. Thyroid disorder  TSH    T4, Free   8. Nutritional anemia, unspecified   Vitamin B12       Plan:         Metastatic breast cancer-Proceed with Abraxane C8D1  Continue Piqray  Zometa monthly -due next week  Continue Norco prn  Monitor anemia- add iron,  b12 and thyroid  level to next labs.   Reassurance given  encouraged compliance with calcium. He will increase to TId for 2 days then resume BID        Route Chart for Scheduling    Med Onc Chart Routing      Follow up with physician . On 3/25 needs EP cbc, cmp iron/tibc, ferritin, b12, tsh, t4 and chemo (C8D8)  and Zometa; on 4/1 needs cbc, cmp only (no EP) and chemo C8D15   Follow up with [facility]    Labs    Imaging    Pharmacy appointment    Other referrals Additional referrals needed         Treatment Plan Information   OP BREAST NAB-PACLITAXEL D1, D8, D15 + ALPELISIB    Rosa Linn MD   Upcoming Treatment Dates - OP BREAST NAB-PACLITAXEL D1, D8, D15 + ALPELISIB     3/18/2022       Chemotherapy       PACLitaxel-protein bound (ABRAXANE) 100 mg/m2 = 300 mg in 60 mL infusion  3/25/2022       Chemotherapy       PACLitaxel-protein bound (ABRAXANE) 100 mg/m2 = 300 mg in 60 mL infusion  4/1/2022       Chemotherapy       PACLitaxel-protein bound (ABRAXANE) 100 mg/m2 = 300 mg in 60 mL infusion    Supportive Plan Information  OP FILGRASTIM 480 MCG   Michelle Grayson NP   Upcoming Treatment Dates - OP FILGRASTIM 480 MCG    No upcoming days in selected categories.    Therapy Plan Information  PORT FLUSH  Flushes  heparin, porcine (PF) 100 unit/mL injection flush 500 Units  500 Units, Intravenous, Every visit  sodium chloride 0.9% flush 10 mL  10 mL, Intravenous, Every visit    ZOLEDRONIC  ACID (ZOMETA) IV  Medications  zoledronic 4 mg/100 mL infusion 4 mg  4 mg, Intravenous, Every 4 weeks  Flushes  sodium chloride 0.9% 250 mL flush bag  Intravenous, Every 4 weeks  sodium chloride 0.9% flush 10 mL  10 mL, Intravenous, Every 4 weeks  heparin, porcine (PF) 100 unit/mL injection flush 500 Units  500 Units, Intravenous, Every 4 weeks  alteplase injection 2 mg  2 mg, Intra-Catheter, Every 4 weeks        Patient is in agreement with the proposed treatment plan. All questions were answered to the patient's satisfaction. Pt knows to call clinic for any new or worsening symptoms and if anything is needed before the next clinic visit.      OZI[facility]AIL[facility]  Hematology & Oncology  [address] [place] [id number]  ph. [phone]  Fax. [phone]     Face to Face time with patient: 25 minutes  35 minutes of total time spent on the encounter, which includes face to face time and non-face to face time preparing to see the patient (eg, review of tests), Obtaining and/or reviewing separately obtained history, Documenting clinical information in the electronic or other health record, Independently interpreting results (not separately reported) and communicating results to the patient/family/caregiver, or Care coordination (not separately reported).

## 2022-12-23 ENCOUNTER — TELEPHONE (OUTPATIENT)
Dept: HEMATOLOGY/ONCOLOGY | Facility: CLINIC | Age: 47
End: 2022-12-23
Payer: MEDICARE

## 2022-12-23 NOTE — TELEPHONE ENCOUNTER
Spoke with oksana. She is calling to get fax number to fax medical necessity form to dr chaney for pcd.    Nurse will get it back to her once dr chaney signs it.

## 2022-12-23 NOTE — TELEPHONE ENCOUNTER
----- Message from Shayla Joyce sent at 12/23/2022  9:12 AM CST -----  Regarding: update      Name Of Caller:   Rosey from [employer].    Contact Preference?:  [phone]         What is the nature of the call?:  Calling to see if the letter of medical necessity was received.

## 2022-12-29 ENCOUNTER — TELEPHONE (OUTPATIENT)
Dept: HEMATOLOGY/ONCOLOGY | Facility: CLINIC | Age: 47
End: 2022-12-29
Payer: MEDICARE

## 2022-12-30 ENCOUNTER — INFUSION (OUTPATIENT)
Dept: INFUSION THERAPY | Facility: HOSPITAL | Age: 47
End: 2022-12-30
Payer: MEDICARE

## 2022-12-30 ENCOUNTER — CLINICAL SUPPORT (OUTPATIENT)
Dept: REHABILITATION | Facility: HOSPITAL | Age: 47
End: 2022-12-30
Payer: MEDICARE

## 2022-12-30 VITALS
WEIGHT: 315 LBS | SYSTOLIC BLOOD PRESSURE: 131 MMHG | DIASTOLIC BLOOD PRESSURE: 67 MMHG | HEIGHT: 74 IN | RESPIRATION RATE: 18 BRPM | BODY MASS INDEX: 40.43 KG/M2 | HEART RATE: 60 BPM | TEMPERATURE: 98 F

## 2022-12-30 DIAGNOSIS — Z17.1 MALIGNANT NEOPLASM INVOLVING BOTH NIPPLE AND AREOLA OF RIGHT BREAST IN MALE, ESTROGEN RECEPTOR NEGATIVE: Primary | ICD-10-CM

## 2022-12-30 DIAGNOSIS — C50.021 MALIGNANT NEOPLASM INVOLVING BOTH NIPPLE AND AREOLA OF RIGHT BREAST IN MALE, ESTROGEN RECEPTOR NEGATIVE: Primary | ICD-10-CM

## 2022-12-30 DIAGNOSIS — I89.0 LYMPHEDEMA OF RIGHT ARM: Primary | ICD-10-CM

## 2022-12-30 DIAGNOSIS — R29.3 POOR POSTURE: ICD-10-CM

## 2022-12-30 DIAGNOSIS — Z74.09 IMPAIRED FUNCTIONAL MOBILITY AND ACTIVITY TOLERANCE: ICD-10-CM

## 2022-12-30 PROCEDURE — 63600175 PHARM REV CODE 636 W HCPCS: Mod: JW,JG | Performed by: NURSE PRACTITIONER

## 2022-12-30 PROCEDURE — 97110 THERAPEUTIC EXERCISES: CPT

## 2022-12-30 PROCEDURE — 96413 CHEMO IV INFUSION 1 HR: CPT

## 2022-12-30 PROCEDURE — 97140 MANUAL THERAPY 1/> REGIONS: CPT

## 2022-12-30 PROCEDURE — 25000003 PHARM REV CODE 250: Performed by: NURSE PRACTITIONER

## 2022-12-30 PROCEDURE — A4216 STERILE WATER/SALINE, 10 ML: HCPCS | Performed by: NURSE PRACTITIONER

## 2022-12-30 RX ORDER — SODIUM CHLORIDE 0.9 % (FLUSH) 0.9 %
10 SYRINGE (ML) INJECTION
Status: CANCELLED | OUTPATIENT
Start: 2022-12-30

## 2022-12-30 RX ORDER — SODIUM CHLORIDE 0.9 % (FLUSH) 0.9 %
10 SYRINGE (ML) INJECTION
Status: DISCONTINUED | OUTPATIENT
Start: 2022-12-30 | End: 2022-12-30 | Stop reason: HOSPADM

## 2022-12-30 RX ORDER — HEPARIN 100 UNIT/ML
500 SYRINGE INTRAVENOUS
Status: DISCONTINUED | OUTPATIENT
Start: 2022-12-30 | End: 2022-12-30 | Stop reason: HOSPADM

## 2022-12-30 RX ORDER — HEPARIN 100 UNIT/ML
500 SYRINGE INTRAVENOUS
Status: CANCELLED | OUTPATIENT
Start: 2022-12-30

## 2022-12-30 RX ORDER — SODIUM CHLORIDE 9 MG/ML
INJECTION, SOLUTION INTRAVENOUS CONTINUOUS
Status: DISCONTINUED | OUTPATIENT
Start: 2022-12-30 | End: 2022-12-30 | Stop reason: HOSPADM

## 2022-12-30 RX ORDER — SODIUM CHLORIDE 9 MG/ML
INJECTION, SOLUTION INTRAVENOUS CONTINUOUS
Status: CANCELLED | OUTPATIENT
Start: 2022-12-30

## 2022-12-30 RX ADMIN — HEPARIN 500 UNITS: 100 SYRINGE at 05:12

## 2022-12-30 RX ADMIN — Medication 10 ML: at 05:12

## 2022-12-30 RX ADMIN — SODIUM CHLORIDE: 9 INJECTION, SOLUTION INTRAVENOUS at 03:12

## 2022-12-30 RX ADMIN — PACLITAXEL 220 MG: 100 INJECTION, POWDER, LYOPHILIZED, FOR SUSPENSION INTRAVENOUS at 04:12

## 2022-12-30 NOTE — PROGRESS NOTES
"                                                    Physical Therapy Daily Treatment Note       Date: 12/30/2022   Name: Paul Li Jr.  Clinic Number: [id number]    Therapy Diagnosis:   Encounter Diagnoses   Name Primary?    Lymphedema of right arm Yes    Impaired functional mobility and activity tolerance     Poor posture      Physician: Rosa Linn MD    Physician Orders: PT Eval and Treat -RUE lymphedema  Medical Diagnosis: Malignant neoplasm involving both nipple and areola of right breast in male, estrogen receptor negative [C50.021, Z17.1], Lymphedema of right upper extremity   Evaluation Date: 10/10/2022  Authorization Period Expiration: 10/14/22  Plan of Care Certification Period: 1/6/23 (12 weeks)  Visit # / Visits authorized: 12/ 15 (plus evaluation)  Insurance: [employer]  FOTO: 2/3     Time In: 11:20 AM (late arrival)  Time Out: 12:20 PM  Total Billable Time: 60 minutes     Precautions: Standard, Fall, cancer, and Spine, Bony Mets, hx of Seizures, L chest wall port      Subjective     Pt reports: He kept bandages on until Wednesday.   He was compliant with self manual lymph drainage  Response to previous treatment: no adverse reaction  Pain Scale: Paul rates pain on a scale of 0/10 on VAS.   Pain location: N/A    Fatigue: "feel fine"  Functional change: none stated  Treatment:   Chemotherapy: te-adjuvant chemo therapy completed 10/19  Pt is currently on chemotherapy: pt is getting 1 infusion/week for 3 weeks with 1 week break, and he takes oral chemotherapy daily.  Radiation: Completed in February 2020  Surgery date: 12/17/19 pt underwent right axillary lymph node dissection and resection excess lateral tissue; 11/22/19 right simple mastectomy with SLNB; total of 18 lymph nodes removed      Objective     Objective Measures updated at progress report unless specified.     LANDMARK RIGHT UE LEFT UE DIFF RUE Diff RUE Diff RUE Diff RUE Diff RUE Diff RUE Diff RUE Diff RUE Diff RUE " "Diff   Date Eval Eval Eval 10/17/22 10/17/22 10/29/22 10/19/22 11/2/22 11/2/22 11/11/22 11/11/22 11/29/22 11/29/22 12/5/22 12/5/22 12/13/22 12/13/22 12/21/22 12/21/22 12/30/22 12/30/22   E + 8" 50 cm 46.5 cm 3.5 cm 48 cm -2 47.5 cm -0.5 50.5 cm +3 47.5 cm -3 49.5 cm +2 50.5 cm +1 48.5 cm -2 49 cm +0.5 48 cm -1   E + 6" 45.5 cm 44 cm 1.5 cm 45 cm -0.5 46.5 cm +1.5 47 cm +0.5 45 cm -2 45.5 cm +0.5 46 cm +0.5 45 cm -1 45.5 cm +0.5 45.5 cm No chg   E + 4" 40 cm 40.5 cm 0.5 cm 40.5 cm +0.5 40.5cm No chg 41 cm +0.5 40.5 cm -0.5 40.5 cm No chg 40 cm -0.5 41.5 cm +1.5  41 cm -0.5 41 cm No chg   E + 2" 38.5 cm 38.5 cm 0 cm 39 cm +0.5 40 cm +1 39.5 cm -0.5 38.5 cm -1 38 cm -0.5 39 cm +1 39 cm No chg 39 cm No chg 39 cm No chg   Elbow 36.5 cm 34 cm 2.5 cm 35.5 cm -1 35.5 cm No chg 36 cm +0.5 34.5 cm -1.5 36 cm +1.5 36 cm No chg 35 cm -1 36 cm +1 35.5 cm -0.5   W+ 8" 37.5cm 33 cm 4.5 cm 38cm +0.5 36.5 cm -1.5 37.5 cm +1 36.5 cm -1 37.5 cm +1 37.5 cm No chg 37 cm -0.5 37 cm No chg 37 cm No chg   W +  6" 35 cm 29.5 cm 5.5 cm 34 cm -1 34.5 cm +0.5 33.5 cm -1 32.5 cm -1 33 cm +0.5 33 cm No chg 33cm No chg 34.5 cm +1.5 34.5 cm No chg   W + 4" 31.5 cm 25 cm 6.5 cm 31.5 cm No chg 31.5 cm No chg 29.5 cm -2 29 cm -0.5 30 cm +1 28 cm -2 29 cm +1 29 cm No chg 29.5 cm +0.5   Wrist 23 cm 20 cm 3 cm 22.5cm -0.5 21.5 cm -1 23 cm +1.5 21 cm -2 24 cm +3 22 cm -2 22 cm No chg 22.5 cm +0.5 23 cm +0.5   DPC 28.5 cm 26 cm 2.5 cm 28 cm -0.5 27.5cm -0.5 28.5 cm +1 27 cm -1.5 28 cm +1 27 cm -1 26.5 cm -0.5 28 cm +1.5 27.5 cm -0.5   IP Thumb 8.5 cm 8 cm 0.5 cm 8.5 cm No chg 8.5 cm No chg 9 cm +0.5 8.5 cm -0.5 8.5 cm No chg 9 cm +0.5 8.5 cm -0.5 8.5 cm No chg 8.5 cm No chg            Treatment     Paul received individual therapeutic exercises to improve postural correction and alignment, stretching and soft tissue mobility, and strengthening for 8 minutes including the following:     Diaphragmatic breathing, 2 x 5 reps  Scapular retractions  15 " reps  Shoulder rolls, 10 reps each fwd and back          Paul received the following manual therapy techniques were performed to increased myofascial/soft tissue length, mobility and pliability, increase PROM, AROM and function as well as to decrease pain for 52minutes    Measurements taken above    Short Neck- held due to history of seizures  Clear Healthy Lymph Nodes:  Left Axilla                                                  Right Groin  Open Anastomoses:  Anterior Axillo-Axillary  (AAA)                                    Axillo-Inguinal     (AI)                                    Posterior Axillo-Axillary  (GEREMIAS) -not performed today       Right Upper Arm (Lateral and Medial)  Right  Antecubital Fossa  Right Dorsal Forearm  Right Volar Forearm  Dorsum Right Hand  Right fingers     Rework Right UE  Rework Anastomoses  Rework Healthy lymph Nodes      PT applies  gauze to right fingers and hand and stockinette, cotton artiflex, and short stretch bandages to pt's RIGHT/LEFT/BILATERAL: right upper extremity. Pt educated to wear bandaging until next session unless it causes pain, numbness, or changes in skin color/temperature, or if they start to fall down.  Size H tubigrip placed over bandages to keep them up. If removed, pt instructed to roll up bandages and cotton padding and bring to next session. If bandages are removed, pt can wear his tubigrip.  Pt has Vet glove to cover bandages in the shower, and we reviewed directions on how to care for bandages. Pt verbalizes understanding of all topics.                Home Exercise Program and Patient Education   Education provided re:  - role of PT in multi - disciplinary team, goals for PT  - progress towards goals         Written Home Exercises Provided: Patient instructed to cont prior HEP.  Exercises were reviewed and Paul was able to demonstrate them prior to the end of the session.  Paul demonstrated good  understanding of the education provided.     See EMR under  Media for self lymphatic drainage provided prior visit.    Pt has no cultural, educational or language barriers to learning provided.    Assessment     Patient tolerated session well.  Pt has been cleared by referring physician to receive complete decongestive therapy. He has been compliant with exercise, elevation, self manual lymph drainage, and wearing compression bandages/tubigrip, yet lymphedema persists. Presents with mostly stable measurements with a few reductions.  He tolerated complete decongestive therapy well.  Once his measurements stabilize, he will benefit from a compression sleeve and glove, and he will also benefit from a compression pump to manage his symptoms.      Pt prognosis is Good. Pt will continue to benefit from skilled outpatient physical therapy to address the deficits listed in the problem list chart on initial evaluation, provide pt/family education and to maximize pt's level of independence in the home and community environment.     Anticipated barriers to physical therapy: advanced disease    Goals as follows:  Short Term Goals (6 weeks)  1. Pt will demo decrease in girth in right upper extremity by >/= 2cm to allow for improved UE symmetry, clothing choice, ROM, and UE function. (progressing, not met)  2. Patient will demonstrate 100% knowledge of lymphedema precautions and signs of infection to allow for reduced risk of lymphedema, reduced risk of infection, and/or exacerbation.  (met)  3. Patient will perform self lymph drainage techniques to enhance lymphatic drainage and mobility.  ( met)  4. Patient will tolerate daily activities with multilayered bandaging to enhance lymphatic drainage and skin elasticity.  (met)  5. Pt will tolerate HEP for improved strength, functional mobility, ROM, posture, and endurance. (met)        Long Term Goals: ( 12  weeks)  1. Patient to show decreased girth in right upper extremity by >/= 3cm to allow for improved UE symmetry, clothing choice, ROM,  and UE function.  (progressing, not met)  2. Patient will show reduction in density to mild or less with improved contour of limb to allow for improved cosmesis, improved lymphatic drainage, and functional mobility.  (progressing, not met)  3. Patient to bruna/doff compression garment with daily compliance to support lymphatic mobility and skin elasticity.  (progressing, not met)  4. Pt to show improved postural awareness and alignment for improved functional mobility.  (progressing, not met)  5. Pt to be independent and compliant with HEP to allow for improved ROM, reach and carry with functional endurance and strength.    (progressing, not met)           Plan   Outpatient physical therapy 2x week for 12 weeks to include the following:   Manual Therapy, Neuromuscular Re-ed, Orthotic Management and Training, Patient Education, Self Care, Therapeutic Activities, and Therapeutic Exercise.     Plan of care Certification Period: 10/10/2022 to 1/6/23.       Therapist: Katelynn Harrell, PT  12/30/2022

## 2022-12-30 NOTE — PLAN OF CARE
1710-Pt tolerated Abraxane infusion well, no complications or side effects, POC and meds discussed with pt, pt aware of upcoming appts, pt knows to call MD with any questions or concerns. Pt ambulated off unit, no distress noted.

## 2023-01-03 ENCOUNTER — SPECIALTY PHARMACY (OUTPATIENT)
Dept: PHARMACY | Facility: CLINIC | Age: 48
End: 2023-01-03
Payer: MEDICARE

## 2023-01-03 ENCOUNTER — TELEPHONE (OUTPATIENT)
Dept: HEMATOLOGY/ONCOLOGY | Facility: CLINIC | Age: 48
End: 2023-01-03
Payer: MEDICARE

## 2023-01-03 NOTE — TELEPHONE ENCOUNTER
----- Message from Florencio Membreno sent at 1/3/2023  1:32 PM CST -----  Regarding: Provider call back  Rosey called from [employer]. letter of medical necessity for pt. She stated that they  have faxed over a request multiple times         Contact [phone]

## 2023-01-03 NOTE — TELEPHONE ENCOUNTER
Spoke with oksana.  She will refax [place] form--and nurse will get it signed and faxed back to her.  She thanked nurse.

## 2023-01-03 NOTE — TELEPHONE ENCOUNTER
Specialty Pharmacy - Refill Coordination    Specialty Medication Orders Linked to Encounter      Flowsheet Row Most Recent Value   Medication #1 alpelisib (PIQRAY) 300 mg/day (150 mg x 2) Tab (Order#[id number], Rx#[id number])            Refill Questions - Documented Responses      Flowsheet Row Most Recent Value   Patient Availability and HIPAA Verification    Does patient want to proceed with activity? Yes   HIPAA/medical authority confirmed? Yes   Relationship to patient of person spoken to? Self   Refill Screening Questions    Changes to allergies? No   Changes to medications? No   New conditions since last clinic visit? No   Unplanned office visit, urgent care, ED, or hospital admission in the last 4 weeks? No   How does patient/caregiver feel medication is working? Good   Financial problems or insurance changes? No   How many doses of your specialty medications were missed in the last 4 weeks? 0   Would patient like to speak to a pharmacist? No   When does the patient need to receive the medication? 01/10/23   Refill Delivery Questions    How will the patient receive the medication? [facility]   When does the patient need to receive the medication? 01/10/23   Shipping Address Home   Address in [facility] confirmed and updated if neccessary? Yes   Expected Copay ($) 10.35   Is the patient able to afford the medication copay? Yes   Payment Method zero copay   Days supply of Refill 30   Supplies needed? No supplies needed   Refill activity completed? Yes   Refill activity plan Refill scheduled   Shipment/Pickup Date: 01/04/23            Current Outpatient Medications   Medication Sig    alpelisib (PIQRAY) 300 mg/day (150 mg x 2) Tab Take 2 tablets (300 mg) by mouth once daily.    ALPRAZolam (XANAX) 0.5 MG tablet Take 1 tablet (0.5 mg total) by mouth 3 (three) times daily as needed for Insomnia or Anxiety.    amLODIPine (NORVASC) 10 MG tablet TAKE 1 TABLET (10 MG) BY MOUTH EVERY DAY    calcium-vitamin D3 (OYSTER  SHELL CALCIUM-VIT D3) 500 mg-5 mcg (200 unit) per tablet Take 1 tablet by mouth 2 (two) times daily.    diazePAM (VALIUM) 5 MG tablet Take 1 tablet (5 mg total) by mouth every 12 (twelve) hours as needed (muscle spasm).    diphenoxylate-atropine 2.5-0.025 mg (LOMOTIL) 2.5-0.025 mg per tablet Take 1 tablet by mouth 4 (four) times daily as needed for Diarrhea.    dulaglutide (TRULICITY) 1.5 mg/0.5 mL pen injector Inject 1.5 mg into the skin once a week.    FLUoxetine 20 MG capsule Take 2 capsules (40 mg total) by mouth once daily.    FLUoxetine 40 MG capsule Take 2 capsules (80 mg total) by mouth once daily.    gabapentin (NEURONTIN) 300 MG capsule Take 1 capsule (300 mg total) by mouth 3 (three) times daily.    hydroCHLOROthiazide (HYDRODIURIL) 25 MG tablet TAKE 1 TABLET BY MOUTH ONCE DAILY    HYDROcodone-acetaminophen (NORCO)  mg per tablet Take 1 tablet by mouth every 6 (six) hours as needed for Pain.    ibuprofen (ADVIL,MOTRIN) 600 MG tablet Take 1 tablet (600 mg total) by mouth every 8 (eight) hours as needed for Pain.    insulin detemir U-100 (LEVEMIR FLEXTOUCH U-100 INSULN) 100 unit/mL (3 mL) InPn pen Inject 21 Units into the skin every evening.    insulin lispro (HUMALOG KWIKPEN INSULIN) 100 unit/mL pen Inject 5 Units into the skin 3 (three) times daily.    lancets 33 gauge Misc 1 lancet by Misc.(Non-Drug; Combo Route) route once daily.    lancing device Misc 1 Device by Misc.(Non-Drug; Combo Route) route 2 (two) times daily with meals.    LIDOcaine-prilocaine (EMLA) cream Apply topically as needed.    losartan (COZAAR) 50 MG tablet Take 2 tablets by mouth once daily    metFORMIN (GLUCOPHAGE) 500 MG tablet Take 2 tablets (1,000 mg total) by mouth 2 (two) times daily with meals. Needs appointment for future refills    oxyCODONE (ROXICODONE) 5 MG immediate release tablet Take 1 tablet (5 mg total) by mouth every 4 (four) hours as needed for Pain.    pen needle, diabetic (BD ULTRA-FINE TANESHA PEN NEEDLE) 32  "gauge x 5/32" Ndle Use as directed with novolog and levemir    tadalafiL (CIALIS) 5 MG tablet Take 2 tablets (10 mg total) by mouth daily as needed for Erectile Dysfunction.    traZODone (DESYREL) 100 MG tablet Take 1 tablet (100 mg total) by mouth every evening.   Last reviewed on 12/20/2022  9:00 AM by Michelle Grayson NP    Review of patient's allergies indicates:  No Known Allergies Last reviewed on  12/30/2022 3:57 PM by Nehal Mejias      Tasks added this encounter   2/2/2023 - Refill Call (Auto Added)   Tasks due within next 3 months   4/3/2023 - Clinical - Follow Up Assesement (180 day)     [facility]  [address]  [facility] [address]  Phone: [phone]  Fax: [phone]        "

## 2023-01-04 ENCOUNTER — PES CALL (OUTPATIENT)
Dept: ADMINISTRATIVE | Facility: CLINIC | Age: 48
End: 2023-01-04
Payer: MEDICARE

## 2023-01-04 PROBLEM — T45.1X5A IMMUNOSUPPRESSED DUE TO CHEMOTHERAPY: Status: ACTIVE | Noted: 2023-01-04

## 2023-01-04 PROBLEM — Z79.899 IMMUNOSUPPRESSED DUE TO CHEMOTHERAPY: Status: ACTIVE | Noted: 2023-01-04

## 2023-01-04 PROBLEM — R37 SEXUAL DYSFUNCTION: Status: RESOLVED | Noted: 2020-12-14 | Resolved: 2023-01-04

## 2023-01-04 PROBLEM — Z51.11 CHEMOTHERAPY MANAGEMENT, ENCOUNTER FOR: Status: RESOLVED | Noted: 2022-03-25 | Resolved: 2023-01-04

## 2023-01-04 PROBLEM — M25.611 DECREASED SHOULDER MOBILITY, RIGHT: Status: RESOLVED | Noted: 2020-01-09 | Resolved: 2023-01-04

## 2023-01-04 PROBLEM — R29.3 POOR POSTURE: Status: RESOLVED | Noted: 2022-10-11 | Resolved: 2023-01-04

## 2023-01-04 PROBLEM — E66.01 MORBID OBESITY: Status: RESOLVED | Noted: 2020-12-24 | Resolved: 2023-01-04

## 2023-01-04 PROBLEM — D84.821 IMMUNOSUPPRESSED DUE TO CHEMOTHERAPY: Status: ACTIVE | Noted: 2023-01-04

## 2023-01-04 PROBLEM — R79.89 ELEVATED SERUM CREATININE: Status: RESOLVED | Noted: 2021-10-22 | Resolved: 2023-01-04

## 2023-01-06 ENCOUNTER — INFUSION (OUTPATIENT)
Dept: INFUSION THERAPY | Facility: HOSPITAL | Age: 48
End: 2023-01-06
Attending: INTERNAL MEDICINE
Payer: MEDICARE

## 2023-01-06 ENCOUNTER — TELEPHONE (OUTPATIENT)
Dept: HEMATOLOGY/ONCOLOGY | Facility: CLINIC | Age: 48
End: 2023-01-06
Payer: MEDICARE

## 2023-01-06 ENCOUNTER — CLINICAL SUPPORT (OUTPATIENT)
Dept: REHABILITATION | Facility: HOSPITAL | Age: 48
End: 2023-01-06
Attending: INTERNAL MEDICINE
Payer: MEDICARE

## 2023-01-06 VITALS
TEMPERATURE: 98 F | SYSTOLIC BLOOD PRESSURE: 162 MMHG | WEIGHT: 315 LBS | RESPIRATION RATE: 18 BRPM | DIASTOLIC BLOOD PRESSURE: 72 MMHG | OXYGEN SATURATION: 98 % | HEART RATE: 62 BPM | HEIGHT: 74 IN | BODY MASS INDEX: 40.43 KG/M2

## 2023-01-06 DIAGNOSIS — Z17.1 MALIGNANT NEOPLASM INVOLVING BOTH NIPPLE AND AREOLA OF RIGHT BREAST IN MALE, ESTROGEN RECEPTOR NEGATIVE: Primary | ICD-10-CM

## 2023-01-06 DIAGNOSIS — Z74.09 IMPAIRED FUNCTIONAL MOBILITY AND ACTIVITY TOLERANCE: ICD-10-CM

## 2023-01-06 DIAGNOSIS — I89.0 LYMPHEDEMA OF RIGHT ARM: Primary | ICD-10-CM

## 2023-01-06 DIAGNOSIS — C50.021 MALIGNANT NEOPLASM INVOLVING BOTH NIPPLE AND AREOLA OF RIGHT BREAST IN MALE, ESTROGEN RECEPTOR NEGATIVE: Primary | ICD-10-CM

## 2023-01-06 PROCEDURE — A4216 STERILE WATER/SALINE, 10 ML: HCPCS | Performed by: INTERNAL MEDICINE

## 2023-01-06 PROCEDURE — 96361 HYDRATE IV INFUSION ADD-ON: CPT

## 2023-01-06 PROCEDURE — 63600175 PHARM REV CODE 636 W HCPCS: Mod: JG | Performed by: INTERNAL MEDICINE

## 2023-01-06 PROCEDURE — 25000003 PHARM REV CODE 250: Performed by: INTERNAL MEDICINE

## 2023-01-06 PROCEDURE — 97140 MANUAL THERAPY 1/> REGIONS: CPT

## 2023-01-06 PROCEDURE — 96413 CHEMO IV INFUSION 1 HR: CPT

## 2023-01-06 RX ORDER — HEPARIN 100 UNIT/ML
500 SYRINGE INTRAVENOUS
Status: CANCELLED | OUTPATIENT
Start: 2023-01-06

## 2023-01-06 RX ORDER — SODIUM CHLORIDE 9 MG/ML
INJECTION, SOLUTION INTRAVENOUS CONTINUOUS
Status: DISCONTINUED | OUTPATIENT
Start: 2023-01-06 | End: 2023-01-06 | Stop reason: HOSPADM

## 2023-01-06 RX ORDER — SODIUM CHLORIDE 0.9 % (FLUSH) 0.9 %
10 SYRINGE (ML) INJECTION
Status: CANCELLED | OUTPATIENT
Start: 2023-01-06

## 2023-01-06 RX ORDER — SODIUM CHLORIDE 0.9 % (FLUSH) 0.9 %
10 SYRINGE (ML) INJECTION
Status: DISCONTINUED | OUTPATIENT
Start: 2023-01-06 | End: 2023-01-06 | Stop reason: HOSPADM

## 2023-01-06 RX ORDER — HEPARIN 100 UNIT/ML
500 SYRINGE INTRAVENOUS
Status: DISCONTINUED | OUTPATIENT
Start: 2023-01-06 | End: 2023-01-06 | Stop reason: HOSPADM

## 2023-01-06 RX ORDER — SODIUM CHLORIDE 9 MG/ML
INJECTION, SOLUTION INTRAVENOUS CONTINUOUS
Status: CANCELLED | OUTPATIENT
Start: 2023-01-06

## 2023-01-06 RX ADMIN — Medication 10 ML: at 12:01

## 2023-01-06 RX ADMIN — SODIUM CHLORIDE: 9 INJECTION, SOLUTION INTRAVENOUS at 10:01

## 2023-01-06 RX ADMIN — PACLITAXEL 220 MG: 100 INJECTION, POWDER, LYOPHILIZED, FOR SUSPENSION INTRAVENOUS at 12:01

## 2023-01-06 RX ADMIN — SODIUM CHLORIDE: 0.9 INJECTION, SOLUTION INTRAVENOUS at 10:01

## 2023-01-06 RX ADMIN — HEPARIN 500 UNITS: 100 SYRINGE at 12:01

## 2023-01-06 NOTE — PROGRESS NOTES
"                                                    Physical Therapy Daily Treatment Note       Date: 1/6/2023   Name: Paul Li Jr.  Clinic Number: [id number]    Therapy Diagnosis:   Encounter Diagnoses   Name Primary?    Lymphedema of right arm Yes    Impaired functional mobility and activity tolerance      Physician: Rosa Linn MD    Physician Orders: PT Eval and Treat -RUE lymphedema  Medical Diagnosis: Malignant neoplasm involving both nipple and areola of right breast in male, estrogen receptor negative [C50.021, Z17.1], Lymphedema of right upper extremity   Evaluation Date: 10/10/2022  Authorization Period Expiration: 10/14/22  Updated Plan of Care Certification Period: 2/10/23 (12 weeks)  Visit # / Visits authorized: 13/ 15 (plus evaluation)  Insurance: [employer]  FOTO: 2/3     Time In: 8:11 AM (late arrival)  Time Out: 9:05 AM  Total Billable Time: 54 minutes     Precautions: Standard, Fall, cancer, and Spine, Bony Mets, hx of Seizures, L chest wall port      Subjective     Pt reports: He kept bandages on until Wednesday. He's been wearing tubigrip with a sports compression sleeve over it. Pt states he's been doing a lot of work with his car this week. Pt has not had an update from [employer] for the compression pump as of yet.  He was compliant with self manual lymph drainage  Response to previous treatment: no adverse reaction  Pain Scale: Paul rates pain on a scale of 0/10 on VAS.   Pain location: N/A    Fatigue: "feel fine"  Functional change: none stated  Treatment:   Chemotherapy: te-adjuvant chemo therapy completed 10/19  Pt is currently on chemotherapy: pt is getting 1 infusion/week for 3 weeks with 1 week break, and he takes oral chemotherapy daily.  Radiation: Completed in February 2020  Surgery date: 12/17/19 pt underwent right axillary lymph node dissection and resection excess lateral tissue; 11/22/19 right simple mastectomy with SLNB; total of 18 lymph nodes " "removed      Objective     Objective Measures updated at progress report unless specified.     LANDMARK RIGHT UE LEFT UE DIFF RUE Diff RUE Diff RUE Diff RUE Diff RUE Diff RUE Diff RUE Diff RUE Diff RUE Diff RUE Diff   Date Eval Eval Eval 10/17/22 10/17/22 10/29/22 10/19/22 11/2/22 11/2/22 11/11/22 11/11/22 11/29/22 11/29/22 12/5/22 12/5/22 12/13/22 12/13/22 12/21/22 12/21/22 12/30/22 12/30/22 1/6/23 1/6/23   E + 8" 50 cm 46.5 cm 3.5 cm 48 cm -2 47.5 cm -0.5 50.5 cm +3 47.5 cm -3 49.5 cm +2 50.5 cm +1 48.5 cm -2 49 cm +0.5 48 cm -1 50 cm +2   E + 6" 45.5 cm 44 cm 1.5 cm 45 cm -0.5 46.5 cm +1.5 47 cm +0.5 45 cm -2 45.5 cm +0.5 46 cm +0.5 45 cm -1 45.5 cm +0.5 45.5 cm No chg 47 cm +1.5   E + 4" 40 cm 40.5 cm 0.5 cm 40.5 cm +0.5 40.5cm No chg 41 cm +0.5 40.5 cm -0.5 40.5 cm No chg 40 cm -0.5 41.5 cm +1.5  41 cm -0.5 41 cm No chg 42.5 cm +1.5   E + 2" 38.5 cm 38.5 cm 0 cm 39 cm +0.5 40 cm +1 39.5 cm -0.5 38.5 cm -1 38 cm -0.5 39 cm +1 39 cm No chg 39 cm No chg 39 cm No chg 39.5 cm +0.5   Elbow 36.5 cm 34 cm 2.5 cm 35.5 cm -1 35.5 cm No chg 36 cm +0.5 34.5 cm -1.5 36 cm +1.5 36 cm No chg 35 cm -1 36 cm +1 35.5 cm -0.5 37.5 cm +2   W+ 8" 37.5cm 33 cm 4.5 cm 38cm +0.5 36.5 cm -1.5 37.5 cm +1 36.5 cm -1 37.5 cm +1 37.5 cm No chg 37 cm -0.5 37 cm No chg 37 cm No chg 38 cm +1   W +  6" 35 cm 29.5 cm 5.5 cm 34 cm -1 34.5 cm +0.5 33.5 cm -1 32.5 cm -1 33 cm +0.5 33 cm No chg 33cm No chg 34.5 cm +1.5 34.5 cm No chg 36 cm +1.5   W + 4" 31.5 cm 25 cm 6.5 cm 31.5 cm No chg 31.5 cm No chg 29.5 cm -2 29 cm -0.5 30 cm +1 28 cm -2 29 cm +1 29 cm No chg 29.5 cm +0.5 32 cm +2.5   Wrist 23 cm 20 cm 3 cm 22.5cm -0.5 21.5 cm -1 23 cm +1.5 21 cm -2 24 cm +3 22 cm -2 22 cm No chg 22.5 cm +0.5 23 cm +0.5 23 cm No chg   DPC 28.5 cm 26 cm 2.5 cm 28 cm -0.5 27.5cm -0.5 28.5 cm +1 27 cm -1.5 28 cm +1 27 cm -1 26.5 cm -0.5 28 cm +1.5 27.5 cm -0.5 27 cm -0.5   IP Thumb 8.5 cm 8 cm 0.5 cm 8.5 cm No chg 8.5 cm No chg 9 cm +0.5 8.5 cm -0.5 8.5 cm No chg " 9 cm +0.5 8.5 cm -0.5 8.5 cm No chg 8.5 cm No chg 9 cm +0.5            Treatment     Paul received individual therapeutic exercises to improve postural correction and alignment, stretching and soft tissue mobility, and strengthening for 2 minutes including the following:     Diaphragmatic breathing, 2 x 5 reps  Scapular retractions  15 reps  Shoulder rolls, 10 reps each fwd and back          Paul received the following manual therapy techniques were performed to increased myofascial/soft tissue length, mobility and pliability, increase PROM, AROM and function as well as to decrease pain for 52 minutes    Measurements taken above    Short Neck- held due to history of seizures  Clear Healthy Lymph Nodes:  Left Axilla                                                  Right Groin  Open Anastomoses:  Anterior Axillo-Axillary  (AAA)                                    Axillo-Inguinal     (AI)                                    Posterior Axillo-Axillary  (GEREMIAS) -not performed today       Right Upper Arm (Lateral and Medial)  Right  Antecubital Fossa  Right Dorsal Forearm  Right Volar Forearm  Dorsum Right Hand  Right fingers     Rework Right UE  Rework Anastomoses  Rework Healthy lymph Nodes      PT applies  gauze to right fingers and hand and stockinette, cotton artiflex, and short stretch bandages to pt's RIGHT/LEFT/BILATERAL: right upper extremity. Pt educated to wear bandaging until next session unless it causes pain, numbness, or changes in skin color/temperature, or if they start to fall down.  Size H tubigrip placed over bandages to keep them up. If removed, pt instructed to roll up bandages and cotton padding and bring to next session. If bandages are removed, pt can wear his tubigrip.  Pt has Vet glove to cover bandages in the shower, and we reviewed directions on how to care for bandages. Pt verbalizes understanding of all topics.                Home Exercise Program and Patient Education   Education provided re:  -  role of PT in multi - disciplinary team, goals for PT  - progress towards goals         Written Home Exercises Provided: Patient instructed to cont prior HEP.  Exercises were reviewed and Paul was able to demonstrate them prior to the end of the session.  Paul demonstrated good  understanding of the education provided.     See EMR under Media for self lymphatic drainage provided prior visit.    Pt has no cultural, educational or language barriers to learning provided.    Assessment     Patient tolerated session well.  Pt has been cleared by referring physician to receive complete decongestive therapy. He has been compliant with exercise, elevation, self manual lymph drainage, and wearing compression bandages/tubigrip, yet lymphedema persists. Presents with increase in right arm measurements today- this may be due to pt report of increased work using his arms over the weekend. He tolerated complete decongestive therapy well.  Once his measurements stabilize, he will benefit from a compression sleeve and glove, and he will also benefit from a compression pump to manage his symptoms. He has met goals as noted below and will benefit from continued therapy to work towards remaining goals.      Pt prognosis is Good. Pt will continue to benefit from skilled outpatient physical therapy to address the deficits listed in the problem list chart on initial evaluation, provide pt/family education and to maximize pt's level of independence in the home and community environment.     Anticipated barriers to physical therapy: advanced disease    Goals as follows:  Short Term Goals (6 weeks)  1. Pt will demo decrease in girth in right upper extremity by >/= 2cm to allow for improved UE symmetry, clothing choice, ROM, and UE function. (progressing, not met)  2. Patient will demonstrate 100% knowledge of lymphedema precautions and signs of infection to allow for reduced risk of lymphedema, reduced risk of infection, and/or  exacerbation.  (met)  3. Patient will perform self lymph drainage techniques to enhance lymphatic drainage and mobility.  ( met)  4. Patient will tolerate daily activities with multilayered bandaging to enhance lymphatic drainage and skin elasticity.  (met)  5. Pt will tolerate HEP for improved strength, functional mobility, ROM, posture, and endurance. (met)        Long Term Goals: ( 12  weeks)  1. Patient to show decreased girth in right upper extremity by >/= 3cm to allow for improved UE symmetry, clothing choice, ROM, and UE function.  (progressing, not met)  2. Patient will show reduction in density to mild or less with improved contour of limb to allow for improved cosmesis, improved lymphatic drainage, and functional mobility.  (progressing, not met)  3. Patient to bruna/doff compression garment with daily compliance to support lymphatic mobility and skin elasticity.  (progressing, not met)  4. Pt to show improved postural awareness and alignment for improved functional mobility.  (progressing, not met)  5. Pt to be independent and compliant with HEP to allow for improved ROM, reach and carry with functional endurance and strength.    (progressing, not met)           Plan   Outpatient physical therapy 2x week for 5weeks to include the following:   Manual Therapy, Neuromuscular Re-ed, Orthotic Management and Training, Patient Education, Self Care, Therapeutic Activities, and Therapeutic Exercise.     Plan of care Certification Period: 1/6/23 to 2/10/23       Therapist: Katelynn Harrell, PT  1/6/2023

## 2023-01-06 NOTE — PLAN OF CARE
1245-Pt tolerated Abraxane infusion and IVF's well, no complications or side effects, POC and meds discussed with pt, pt aware of upcoming appts, pt knows to call MD with any questions or concerns. Pt ambulated off unit, no distress noted.

## 2023-01-06 NOTE — TELEPHONE ENCOUNTER
"----- Message from Skyler Garcia sent at 1/6/2023  9:37 AM CST -----  Consult/Advisory:          Name Of Caller:  Rosey ([employer]      Contact Preference?:  [phone]     Fax  [phone]      Provider Name: Temitope      Does patient feel the need to be seen today? No      What is the nature of the call?:  Requesting letter of medical necessity for nomadic compression device (compressor pump)          Additional Notes: Third time reaching out regarding the matter (Other times were on 12/23 and 1/3)      "Thank you for all that you do for our patients"      "

## 2023-01-06 NOTE — TELEPHONE ENCOUNTER
Spoke with representative. She will refax medical necessity form to nurse--  Nurse will refax once signed by dr chaney.  She thanked nurse.

## 2023-01-09 ENCOUNTER — HOSPITAL ENCOUNTER (OUTPATIENT)
Facility: HOSPITAL | Age: 48
Discharge: HOME | End: 2023-01-10
Attending: EMERGENCY MEDICINE | Admitting: HOSPITALIST
Payer: COMMERCIAL

## 2023-01-09 ENCOUNTER — SPECIALTY PHARMACY (OUTPATIENT)
Dept: PHARMACY | Facility: CLINIC | Age: 48
End: 2023-01-09
Payer: MEDICARE

## 2023-01-09 ENCOUNTER — TELEPHONE (OUTPATIENT)
Dept: HEMATOLOGY/ONCOLOGY | Facility: CLINIC | Age: 48
End: 2023-01-09
Payer: MEDICARE

## 2023-01-09 ENCOUNTER — DOCUMENTATION ONLY (OUTPATIENT)
Dept: REHABILITATION | Facility: HOSPITAL | Age: 48
End: 2023-01-09
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
## 2023-01-09 DIAGNOSIS — G89.3 NEOPLASM RELATED PAIN: ICD-10-CM

## 2023-01-09 DIAGNOSIS — C50.021 MALIGNANT NEOPLASM INVOLVING BOTH NIPPLE AND AREOLA OF RIGHT BREAST IN MALE, ESTROGEN RECEPTOR NEGATIVE: ICD-10-CM

## 2023-01-09 DIAGNOSIS — R52 INTRACTABLE PAIN: Primary | ICD-10-CM

## 2023-01-09 DIAGNOSIS — Z17.1 MALIGNANT NEOPLASM INVOLVING BOTH NIPPLE AND AREOLA OF RIGHT BREAST IN MALE, ESTROGEN RECEPTOR NEGATIVE: ICD-10-CM

## 2023-01-09 DIAGNOSIS — M54.9 ACUTE MIDLINE BACK PAIN, UNSPECIFIED BACK LOCATION: ICD-10-CM

## 2023-01-09 DIAGNOSIS — E11.65 UNCONTROLLED TYPE 2 DIABETES MELLITUS WITH HYPERGLYCEMIA: ICD-10-CM

## 2023-01-09 DIAGNOSIS — C79.51 BONE METASTASES: ICD-10-CM

## 2023-01-09 DIAGNOSIS — E66.01 MORBID OBESITY WITH BMI OF 50.0-59.9, ADULT: ICD-10-CM

## 2023-01-09 LAB
ALBUMIN SERPL BCP-MCNC: 3.5 G/DL (ref 3.5–5.2)
ALP SERPL-CCNC: 86 U/L (ref 55–135)
ALT SERPL W/O P-5'-P-CCNC: 19 U/L (ref 10–44)
ANION GAP SERPL CALC-SCNC: 9 MMOL/L (ref 8–16)
AST SERPL-CCNC: 19 U/L (ref 10–40)
BASOPHILS # BLD AUTO: 0.01 K/UL (ref 0–0.2)
BASOPHILS NFR BLD: 0.4 % (ref 0–1.9)
BILIRUB SERPL-MCNC: 0.9 MG/DL (ref 0.1–1)
BUN SERPL-MCNC: 13 MG/DL (ref 6–20)
CALCIUM SERPL-MCNC: 8.9 MG/DL (ref 8.7–10.5)
CHLORIDE SERPL-SCNC: 103 MMOL/L (ref 95–110)
CO2 SERPL-SCNC: 25 MMOL/L (ref 23–29)
CREAT SERPL-MCNC: 1 MG/DL (ref 0.5–1.4)
CTP QC/QA: YES
DIFFERENTIAL METHOD: ABNORMAL
EOSINOPHIL # BLD AUTO: 0 K/UL (ref 0–0.5)
EOSINOPHIL NFR BLD: 0.9 % (ref 0–8)
ERYTHROCYTE [DISTWIDTH] IN BLOOD BY AUTOMATED COUNT: 18.3 % (ref 11.5–14.5)
EST. GFR  (NO RACE VARIABLE): >60 ML/MIN/1.73 M^2
GLUCOSE SERPL-MCNC: 92 MG/DL (ref 70–110)
HCT VFR BLD AUTO: 30 % (ref 40–54)
HGB BLD-MCNC: 9.2 G/DL (ref 14–18)
IMM GRANULOCYTES # BLD AUTO: 0.02 K/UL (ref 0–0.04)
IMM GRANULOCYTES NFR BLD AUTO: 0.9 % (ref 0–0.5)
LYMPHOCYTES # BLD AUTO: 0.8 K/UL (ref 1–4.8)
LYMPHOCYTES NFR BLD: 32.3 % (ref 18–48)
MCH RBC QN AUTO: 24.3 PG (ref 27–31)
MCHC RBC AUTO-ENTMCNC: 30.7 G/DL (ref 32–36)
MCV RBC AUTO: 79 FL (ref 82–98)
MONOCYTES # BLD AUTO: 0.2 K/UL (ref 0.3–1)
MONOCYTES NFR BLD: 9.9 % (ref 4–15)
NEUTROPHILS # BLD AUTO: 1.3 K/UL (ref 1.8–7.7)
NEUTROPHILS NFR BLD: 55.6 % (ref 38–73)
NRBC BLD-RTO: 0 /100 WBC
PLATELET # BLD AUTO: 220 K/UL (ref 150–450)
PMV BLD AUTO: 10.8 FL (ref 9.2–12.9)
POCT GLUCOSE: 62 MG/DL (ref 70–110)
POTASSIUM SERPL-SCNC: 4 MMOL/L (ref 3.5–5.1)
PROT SERPL-MCNC: 7.2 G/DL (ref 6–8.4)
RBC # BLD AUTO: 3.79 M/UL (ref 4.6–6.2)
SARS-COV-2 RDRP RESP QL NAA+PROBE: NEGATIVE
SODIUM SERPL-SCNC: 137 MMOL/L (ref 136–145)
WBC # BLD AUTO: 2.32 K/UL (ref 3.9–12.7)

## 2023-01-09 PROCEDURE — G0378 HOSPITAL OBSERVATION PER HR: HCPCS

## 2023-01-09 PROCEDURE — 85025 COMPLETE CBC W/AUTO DIFF WBC: CPT | Performed by: STUDENT IN AN ORGANIZED HEALTH CARE EDUCATION/TRAINING PROGRAM

## 2023-01-09 PROCEDURE — 99223 PR INITIAL HOSPITAL CARE,LEVL III: ICD-10-PCS | Mod: AI,GC,, | Performed by: HOSPITALIST

## 2023-01-09 PROCEDURE — 99285 EMERGENCY DEPT VISIT HI MDM: CPT | Mod: CS,,, | Performed by: EMERGENCY MEDICINE

## 2023-01-09 PROCEDURE — 96372 THER/PROPH/DIAG INJ SC/IM: CPT | Mod: 59 | Performed by: STUDENT IN AN ORGANIZED HEALTH CARE EDUCATION/TRAINING PROGRAM

## 2023-01-09 PROCEDURE — 96375 TX/PRO/DX INJ NEW DRUG ADDON: CPT

## 2023-01-09 PROCEDURE — 63600175 PHARM REV CODE 636 W HCPCS: Performed by: STUDENT IN AN ORGANIZED HEALTH CARE EDUCATION/TRAINING PROGRAM

## 2023-01-09 PROCEDURE — 96374 THER/PROPH/DIAG INJ IV PUSH: CPT

## 2023-01-09 PROCEDURE — 96376 TX/PRO/DX INJ SAME DRUG ADON: CPT

## 2023-01-09 PROCEDURE — 99285 PR EMERGENCY DEPT VISIT,LEVEL V: ICD-10-PCS | Mod: CS,,, | Performed by: EMERGENCY MEDICINE

## 2023-01-09 PROCEDURE — 25000003 PHARM REV CODE 250: Performed by: STUDENT IN AN ORGANIZED HEALTH CARE EDUCATION/TRAINING PROGRAM

## 2023-01-09 PROCEDURE — 12000002 HC ACUTE/MED SURGE SEMI-PRIVATE ROOM

## 2023-01-09 PROCEDURE — 87635 SARS-COV-2 COVID-19 AMP PRB: CPT | Performed by: STUDENT IN AN ORGANIZED HEALTH CARE EDUCATION/TRAINING PROGRAM

## 2023-01-09 PROCEDURE — 80053 COMPREHEN METABOLIC PANEL: CPT | Performed by: STUDENT IN AN ORGANIZED HEALTH CARE EDUCATION/TRAINING PROGRAM

## 2023-01-09 PROCEDURE — 99285 EMERGENCY DEPT VISIT HI MDM: CPT | Mod: 25

## 2023-01-09 PROCEDURE — 99223 1ST HOSP IP/OBS HIGH 75: CPT | Mod: AI,GC,, | Performed by: HOSPITALIST

## 2023-01-09 RX ORDER — INSULIN ASPART 100 [IU]/ML
0-5 INJECTION, SOLUTION INTRAVENOUS; SUBCUTANEOUS
Status: DISCONTINUED | OUTPATIENT
Start: 2023-01-09 | End: 2023-01-10 | Stop reason: HOSPADM

## 2023-01-09 RX ORDER — ONDANSETRON 2 MG/ML
4 INJECTION INTRAMUSCULAR; INTRAVENOUS
Status: COMPLETED | OUTPATIENT
Start: 2023-01-09 | End: 2023-01-09

## 2023-01-09 RX ORDER — SODIUM,POTASSIUM PHOSPHATES 280-250MG
2 POWDER IN PACKET (EA) ORAL
Status: DISCONTINUED | OUTPATIENT
Start: 2023-01-09 | End: 2023-01-10 | Stop reason: HOSPADM

## 2023-01-09 RX ORDER — HYDROCODONE BITARTRATE AND ACETAMINOPHEN 10; 325 MG/1; MG/1
1 TABLET ORAL EVERY 6 HOURS PRN
Qty: 90 TABLET | Refills: 0 | Status: SHIPPED | OUTPATIENT
Start: 2023-01-09 | End: 2023-02-07 | Stop reason: SDUPTHER

## 2023-01-09 RX ORDER — HYDROCHLOROTHIAZIDE 25 MG/1
25 TABLET ORAL DAILY
Status: DISCONTINUED | OUTPATIENT
Start: 2023-01-10 | End: 2023-01-10 | Stop reason: HOSPADM

## 2023-01-09 RX ORDER — HYDROMORPHONE HYDROCHLORIDE 1 MG/ML
1 INJECTION, SOLUTION INTRAMUSCULAR; INTRAVENOUS; SUBCUTANEOUS
Status: COMPLETED | OUTPATIENT
Start: 2023-01-09 | End: 2023-01-09

## 2023-01-09 RX ORDER — GABAPENTIN 300 MG/1
300 CAPSULE ORAL 3 TIMES DAILY
Status: DISCONTINUED | OUTPATIENT
Start: 2023-01-09 | End: 2023-01-10 | Stop reason: HOSPADM

## 2023-01-09 RX ORDER — FLUOXETINE HYDROCHLORIDE 20 MG/1
40 CAPSULE ORAL DAILY
Status: DISCONTINUED | OUTPATIENT
Start: 2023-01-10 | End: 2023-01-10 | Stop reason: HOSPADM

## 2023-01-09 RX ORDER — LOSARTAN POTASSIUM 50 MG/1
50 TABLET ORAL DAILY
Status: DISCONTINUED | OUTPATIENT
Start: 2023-01-10 | End: 2023-01-10 | Stop reason: HOSPADM

## 2023-01-09 RX ORDER — ONDANSETRON 2 MG/ML
4 INJECTION INTRAMUSCULAR; INTRAVENOUS EVERY 8 HOURS PRN
Status: DISCONTINUED | OUTPATIENT
Start: 2023-01-09 | End: 2023-01-10 | Stop reason: HOSPADM

## 2023-01-09 RX ORDER — POLYETHYLENE GLYCOL 3350 17 G/17G
17 POWDER, FOR SOLUTION ORAL DAILY
Status: DISCONTINUED | OUTPATIENT
Start: 2023-01-10 | End: 2023-01-10 | Stop reason: HOSPADM

## 2023-01-09 RX ORDER — LANOLIN ALCOHOL/MO/W.PET/CERES
800 CREAM (GRAM) TOPICAL
Status: DISCONTINUED | OUTPATIENT
Start: 2023-01-09 | End: 2023-01-10 | Stop reason: HOSPADM

## 2023-01-09 RX ORDER — IBUPROFEN 200 MG
24 TABLET ORAL
Status: DISCONTINUED | OUTPATIENT
Start: 2023-01-09 | End: 2023-01-10 | Stop reason: HOSPADM

## 2023-01-09 RX ORDER — ALPRAZOLAM 0.5 MG/1
0.5 TABLET ORAL 3 TIMES DAILY PRN
Status: DISCONTINUED | OUTPATIENT
Start: 2023-01-09 | End: 2023-01-10 | Stop reason: HOSPADM

## 2023-01-09 RX ORDER — IBUPROFEN 200 MG
16 TABLET ORAL
Status: DISCONTINUED | OUTPATIENT
Start: 2023-01-09 | End: 2023-01-10 | Stop reason: HOSPADM

## 2023-01-09 RX ORDER — ENOXAPARIN SODIUM 100 MG/ML
40 INJECTION SUBCUTANEOUS EVERY 12 HOURS
Status: DISCONTINUED | OUTPATIENT
Start: 2023-01-09 | End: 2023-01-10 | Stop reason: HOSPADM

## 2023-01-09 RX ORDER — GLUCAGON 1 MG
1 KIT INJECTION
Status: DISCONTINUED | OUTPATIENT
Start: 2023-01-09 | End: 2023-01-10 | Stop reason: HOSPADM

## 2023-01-09 RX ORDER — HYDROMORPHONE HYDROCHLORIDE 1 MG/ML
1 INJECTION, SOLUTION INTRAMUSCULAR; INTRAVENOUS; SUBCUTANEOUS EVERY 6 HOURS PRN
Status: DISCONTINUED | OUTPATIENT
Start: 2023-01-09 | End: 2023-01-10 | Stop reason: HOSPADM

## 2023-01-09 RX ORDER — CYCLOBENZAPRINE HCL 5 MG
5 TABLET ORAL 3 TIMES DAILY PRN
Status: DISCONTINUED | OUTPATIENT
Start: 2023-01-09 | End: 2023-01-10 | Stop reason: HOSPADM

## 2023-01-09 RX ORDER — PROCHLORPERAZINE EDISYLATE 5 MG/ML
5 INJECTION INTRAMUSCULAR; INTRAVENOUS EVERY 6 HOURS PRN
Status: DISCONTINUED | OUTPATIENT
Start: 2023-01-09 | End: 2023-01-10 | Stop reason: HOSPADM

## 2023-01-09 RX ORDER — HYDROCODONE BITARTRATE AND ACETAMINOPHEN 10; 325 MG/1; MG/1
1 TABLET ORAL EVERY 6 HOURS PRN
Status: DISCONTINUED | OUTPATIENT
Start: 2023-01-09 | End: 2023-01-10 | Stop reason: HOSPADM

## 2023-01-09 RX ORDER — TRAZODONE HYDROCHLORIDE 100 MG/1
100 TABLET ORAL NIGHTLY
Status: DISCONTINUED | OUTPATIENT
Start: 2023-01-09 | End: 2023-01-10 | Stop reason: HOSPADM

## 2023-01-09 RX ORDER — AMOXICILLIN 250 MG
1 CAPSULE ORAL 2 TIMES DAILY
Status: DISCONTINUED | OUTPATIENT
Start: 2023-01-09 | End: 2023-01-10 | Stop reason: HOSPADM

## 2023-01-09 RX ORDER — SODIUM CHLORIDE 0.9 % (FLUSH) 0.9 %
10 SYRINGE (ML) INJECTION EVERY 12 HOURS PRN
Status: DISCONTINUED | OUTPATIENT
Start: 2023-01-09 | End: 2023-01-10 | Stop reason: HOSPADM

## 2023-01-09 RX ORDER — NALOXONE HCL 0.4 MG/ML
0.02 VIAL (ML) INJECTION
Status: DISCONTINUED | OUTPATIENT
Start: 2023-01-09 | End: 2023-01-10 | Stop reason: HOSPADM

## 2023-01-09 RX ORDER — AMLODIPINE BESYLATE 10 MG/1
10 TABLET ORAL DAILY
Status: DISCONTINUED | OUTPATIENT
Start: 2023-01-10 | End: 2023-01-10 | Stop reason: HOSPADM

## 2023-01-09 RX ADMIN — ONDANSETRON 4 MG: 2 INJECTION INTRAMUSCULAR; INTRAVENOUS at 11:01

## 2023-01-09 RX ADMIN — HYDROMORPHONE HYDROCHLORIDE 1 MG: 1 INJECTION, SOLUTION INTRAMUSCULAR; INTRAVENOUS; SUBCUTANEOUS at 11:01

## 2023-01-09 RX ADMIN — GABAPENTIN 300 MG: 300 CAPSULE ORAL at 08:01

## 2023-01-09 RX ADMIN — HYDROCODONE BITARTRATE AND ACETAMINOPHEN 1 TABLET: 10; 325 TABLET ORAL at 05:01

## 2023-01-09 RX ADMIN — TRAZODONE HYDROCHLORIDE 100 MG: 100 TABLET ORAL at 08:01

## 2023-01-09 RX ADMIN — ENOXAPARIN SODIUM 40 MG: 40 INJECTION SUBCUTANEOUS at 08:01

## 2023-01-09 RX ADMIN — HYDROMORPHONE HYDROCHLORIDE 1 MG: 1 INJECTION, SOLUTION INTRAMUSCULAR; INTRAVENOUS; SUBCUTANEOUS at 02:01
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
## 2023-01-09 NOTE — ED NOTES
Pt transported to [facility] with all personal belongings,  NADN.  Report given to Mildred.
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
## 2023-01-09 NOTE — PROGRESS NOTES
Pharmacist Renal Dose Adjustment Note    Paul Li Jr. is a 47 y.o. male being treated with the enoxaparin 40 mg every 24 hours for VTE ppx    Patient Data:    Vital Signs (Most Recent):  Temp: 98.6 °F (37 °C) (01/09/23 1700)  Pulse: 61 (01/09/23 1700)  Resp: 12 (01/09/23 1700)  BP: 120/62 (01/09/23 1700)  SpO2: 98 % (01/09/23 1700) Vital Signs (72h Range):  Temp:  [98.4 °F (36.9 °C)-98.6 °F (37 °C)]   Pulse:  [61-75]   Resp:  [12-20]   BP: (119-154)/(58-90)   SpO2:  [98 %-100 %]      Recent Labs   Lab 01/06/23  0910 01/09/23  1140   CREATININE 1.2 1.0     Serum creatinine: 1 mg/dL 01/09/23 1140  Estimated creatinine clearance: 145.8 mL/min    Adjusted to Enoxaparin 40 mg every 12 hours, based on obesity BMI > 40 Kg/m2 per pharmacy anticoagulation  protocol.     Pharmacist's Name: Prudencio Larose  Pharmacist's Extension: [phone]
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
## 2023-01-09 NOTE — CONSULTS
Consult Note:    Please see H&P as consult note.    Andrade Eldridge D.O.  Hematology/Oncology Fellow, PGY-IV
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
## 2023-01-09 NOTE — PLAN OF CARE
"  Outpatient Therapy Updated Plan of Care     Visit Date: 1/6/2023  Name: Paul Li Jr.  Clinic Number: [id number]    Therapy Diagnosis:   Encounter Diagnoses   Name Primary?    Lymphedema of right arm Yes    Impaired functional mobility and activity tolerance      Physician: Rosa Linn MD    Physician Orders: PT Eval and Treat -RUE lymphedema  Medical Diagnosis: Malignant neoplasm involving both nipple and areola of right breast in male, estrogen receptor negative [C50.021, Z17.1], Lymphedema of right upper extremity   Evaluation Date: 10/10/2022    Total Visits Received: 14  Cancelled Visits: unknown  No Show Visits: unknown    Current Certification Period:  10/1022 to 1/6/23  Precautions:  Standard, Cancer, Spine, bony mets, history of seizures, RUE lymphedema  Visits from Evaluation Date:  13      Subjective     Update:      Pt reports: He kept bandages on until Wednesday. He's been wearing tubigrip with a sports compression sleeve over it. Pt states he's been doing a lot of work with his car this week. Pt has not had an update from [employer] for the compression pump as of yet.  He was compliant with self manual lymph drainage  Response to previous treatment: no adverse reaction  Pain Scale: Paul rates pain on a scale of 0/10 on VAS.   Pain location: N/A     Fatigue: "feel fine"  Functional change: none stated    Objective     Update:   LANDMARK RIGHT UE LEFT UE DIFF RUE Diff RUE Diff RUE Diff RUE Diff RUE Diff RUE Diff RUE Diff RUE Diff RUE Diff RUE Diff   Date Eval Eval Eval 10/17/22 10/17/22 10/29/22 10/19/22 11/2/22 11/2/22 11/11/22 11/11/22 11/29/22 11/29/22 12/5/22 12/5/22 12/13/22 12/13/22 12/21/22 12/21/22 12/30/22 12/30/22 1/6/23 1/6/23   E + 8" 50 cm 46.5 cm 3.5 cm 48 cm -2 47.5 cm -0.5 50.5 cm +3 47.5 cm -3 49.5 cm +2 50.5 cm +1 48.5 cm -2 49 cm +0.5 48 cm -1 50 cm +2   E + 6" 45.5 cm 44 cm 1.5 cm 45 cm -0.5 46.5 cm +1.5 47 cm +0.5 45 cm -2 45.5 cm +0.5 46 cm +0.5 45 cm -1 45.5 cm +0.5 45.5 cm No " "chg 47 cm +1.5   E + 4" 40 cm 40.5 cm 0.5 cm 40.5 cm +0.5 40.5cm No chg 41 cm +0.5 40.5 cm -0.5 40.5 cm No chg 40 cm -0.5 41.5 cm +1.5  41 cm -0.5 41 cm No chg 42.5 cm +1.5   E + 2" 38.5 cm 38.5 cm 0 cm 39 cm +0.5 40 cm +1 39.5 cm -0.5 38.5 cm -1 38 cm -0.5 39 cm +1 39 cm No chg 39 cm No chg 39 cm No chg 39.5 cm +0.5   Elbow 36.5 cm 34 cm 2.5 cm 35.5 cm -1 35.5 cm No chg 36 cm +0.5 34.5 cm -1.5 36 cm +1.5 36 cm No chg 35 cm -1 36 cm +1 35.5 cm -0.5 37.5 cm +2   W+ 8" 37.5cm 33 cm 4.5 cm 38cm +0.5 36.5 cm -1.5 37.5 cm +1 36.5 cm -1 37.5 cm +1 37.5 cm No chg 37 cm -0.5 37 cm No chg 37 cm No chg 38 cm +1   W +  6" 35 cm 29.5 cm 5.5 cm 34 cm -1 34.5 cm +0.5 33.5 cm -1 32.5 cm -1 33 cm +0.5 33 cm No chg 33cm No chg 34.5 cm +1.5 34.5 cm No chg 36 cm +1.5   W + 4" 31.5 cm 25 cm 6.5 cm 31.5 cm No chg 31.5 cm No chg 29.5 cm -2 29 cm -0.5 30 cm +1 28 cm -2 29 cm +1 29 cm No chg 29.5 cm +0.5 32 cm +2.5   Wrist 23 cm 20 cm 3 cm 22.5cm -0.5 21.5 cm -1 23 cm +1.5 21 cm -2 24 cm +3 22 cm -2 22 cm No chg 22.5 cm +0.5 23 cm +0.5 23 cm No chg   DPC 28.5 cm 26 cm 2.5 cm 28 cm -0.5 27.5cm -0.5 28.5 cm +1 27 cm -1.5 28 cm +1 27 cm -1 26.5 cm -0.5 28 cm +1.5 27.5 cm -0.5 27 cm -0.5   IP Thumb 8.5 cm 8 cm 0.5 cm 8.5 cm No chg 8.5 cm No chg 9 cm +0.5 8.5 cm -0.5 8.5 cm No chg 9 cm +0.5 8.5 cm -0.5 8.5 cm No chg 8.5 cm No chg 9 cm +0.5        Assessment     Update:   Patient tolerated session well.  Pt has been cleared by referring physician to receive complete decongestive therapy. He has been compliant with exercise, elevation, self manual lymph drainage, and wearing compression bandages/tubigrip, yet lymphedema persists. Presents with increase in right arm measurements today- this may be due to pt report of increased work using his arms over the weekend. He tolerated complete decongestive therapy well.  Once his measurements stabilize, he will benefit from a compression sleeve and glove, and he will also benefit from a compression pump " to manage his symptoms. He has met goals as noted in today's treatment note and will benefit from continued therapy to work towards remaining goals.    Previous Short Term Goals Status:   met partially  New Short Term Goals Status:   N/A  Long Term Goal Status:   continue per initial plan of care.  Reasons for Recertification of Therapy:   pt with recent increase in RUE circumference measurements. He will benefit from continued therapy to address remaining goals, reduce his right arm circumference, and get him fitted for a compression sleeve and glove. He will benefit from a compression pump to manage his lymphedema at home    Plan     Updated Certification Period: 1/6/2023 to 2/10/23  Recommended Treatment Plan: 2 times per week for 5 weeks: Manual Therapy, Neuromuscular Re-ed, Orthotic Management and Training, Patient Education, Self Care, Therapeutic Activities, and Therapeutic Exercise  Other Recommendations: none anticipated    Katelynn Harrell, PT  1/6/2023      I CERTIFY THE NEED FOR THESE SERVICES FURNISHED UNDER THIS PLAN OF TREATMENT AND WHILE UNDER MY CARE    Physician's comments:        Physician's Signature: ___________________________________________________
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
## 2023-01-09 NOTE — ED NOTES
Pt AAOx3, resting in bed. No acute distress noted. Skin warm and dry, healed R sided mastectomy noted. RR even and unlabored. Complains of pain to spine, requesting pain meds, no other complaints voiced. Pt eating foot provided by family. Pt placed in bigger gown, repositioned, and provided with pillow and blanket. Limb alert placed to R wrist - hx of mastectomy. Side rails up x2. Call light within reach. Family at bedside. Updated on POC, verbalizes understanding.

## 2023-01-09 NOTE — SUBJECTIVE & OBJECTIVE
Oncology Treatment Plan:   OP BREAST NAB-PACLITAXEL D1, D8, D15 + ALPELISIB     Medications:  Continuous Infusions:  Scheduled Meds:   enoxaparin  40 mg Subcutaneous Q12H    [START ON 1/10/2023] polyethylene glycol  17 g Oral Daily    senna-docusate 8.6-50 mg  1 tablet Oral BID     PRN Meds:dextrose 10%, dextrose 10%, dextrose 10%, dextrose 10%, dextrose 10%, dextrose 10%, dextrose 50%, dextrose 50%, glucagon (human recombinant), glucagon (human recombinant), glucose, glucose, glucose, glucose, HYDROcodone-acetaminophen, HYDROmorphone, insulin aspart U-100, magnesium oxide, magnesium oxide, naloxone, ondansetron, potassium bicarbonate, potassium bicarbonate, potassium bicarbonate, potassium, sodium phosphates, potassium, sodium phosphates, potassium, sodium phosphates, prochlorperazine, sodium chloride 0.9%     Review of patient's allergies indicates:  No Known Allergies     Past Medical History:   Diagnosis Date    Breast cancer     Cancer     R breast    Diabetes mellitus     HTN (hypertension)     Leg edema, right     Prediabetes     Seizures     at age 18 - stress related    Therapy     marital counseling     Past Surgical History:   Procedure Laterality Date    AXILLARY NODE DISSECTION Right 11/22/2019    Procedure: LYMPHADENECTOMY, AXILLARY RIGHT;  Surgeon: Shima Whyte MD;  Location: [facility];  Service: General;  Laterality: Right;    AXILLARY NODE DISSECTION Right 12/17/2019    Procedure: LYMPHADENECTOMY, AXILLARY;  Surgeon: Shima Whyte MD;  Location: [address];  Service: General;  Laterality: Right;    BREAST BIOPSY      EXCISION OF HYDROCELE Right 10/28/2022    Procedure: HYDROCELECTOMY;  Surgeon: Anthony Cordero Jr., MD;  Location: [address];  Service: Urology;  Laterality: Right;  1 hour    INSERTION OF TUNNELED CENTRAL VENOUS CATHETER (CVC) WITH SUBCUTANEOUS PORT Left 5/24/2019    Procedure: [id number];  Surgeon: Shima Whyte MD;  Location: [address];  Service: General;   Laterality: Left;    INSERTION OF TUNNELED CENTRAL VENOUS CATHETER (CVC) WITH SUBCUTANEOUS PORT Left 2021    Procedure: INSERTION, SINGLE LUMEN CATHETER WITH PORT, WITH FLUOROSCOPIC GUIDANCE, right;  Surgeon: Gloria Holliday MD;  Location: [address];  Service: General;  Laterality: Left;  rapid covid test    SENTINEL LYMPH NODE BIOPSY Right 2019    Procedure: BIOPSY, LYMPH NODE, SENTINEL RIGHT;  Surgeon: Shima Whyte MD;  Location: [facility];  Service: General;  Laterality: Right;    SIMPLE MASTECTOMY Right 2019    Procedure: MASTECTOMY, SIMPLE RIGHT (CONSENT AM OF) 2.5 hr case;  Surgeon: Shima Whyte MD;  Location: [facility];  Service: General;  Laterality: Right;     Family History       Problem Relation (Age of Onset)    Breast cancer Paternal Grandmother    Colon cancer Maternal Grandfather    Diabetes Mother    Fibromyalgia Paternal Aunt    Hypertension Mother, Father    Lupus Father, Paternal Aunt    No Known Problems Maternal Grandmother, Paternal Grandfather, Sister, Maternal Aunt, Maternal Uncle, Paternal Uncle, Brother, Sister, Son, Son, Son, Son    Post-traumatic stress disorder Brother          Tobacco Use    Smoking status: Former     Packs/day: 0.25     Years: 15.00     Pack years: 3.75     Types: Cigarettes, Vaping with nicotine     Quit date: 2014     Years since quittin.1    Smokeless tobacco: Never    Tobacco comments:     Social smoker with alcohol    Substance and Sexual Activity    Alcohol use: Yes     Alcohol/week: 0.0 standard drinks     Comment: Rarely    Drug use: Not Currently     Types: Marijuana    Sexual activity: Yes     Partners: Female     Birth control/protection: None       Review of Systems   Constitutional:  Positive for activity change.   Musculoskeletal:  Positive for back pain, gait problem, joint swelling, neck pain and neck stiffness.   Psychiatric/Behavioral:          Anxiety.     Objective:     Vital Signs (Most Recent):  Temp: 98.6 °F  (37 °C) (01/09/23 1700)  Pulse: 61 (01/09/23 1700)  Resp: 12 (01/09/23 1700)  BP: 120/62 (01/09/23 1700)  SpO2: 98 % (01/09/23 1700) Vital Signs (24h Range):  Temp:  [98.4 °F (36.9 °C)-98.6 °F (37 °C)] 98.6 °F (37 °C)  Pulse:  [61-75] 61  Resp:  [12-20] 12  SpO2:  [98 %-100 %] 98 %  BP: (119-154)/(58-90) 120/62     Weight: (!) 152.9 kg (337 lb 1.3 oz)  Body mass index is 43.28 kg/m².  Body surface area is 2.83 meters squared.      Intake/Output Summary (Last 24 hours) at 1/9/2023 1749  Last data filed at 1/9/2023 1742  Gross per 24 hour   Intake --   Output 650 ml   Net -650 ml       Physical Exam  Vitals and nursing note reviewed.   Constitutional:       Comments: Middle-aged male in C-Collar in no acute distress but complaining of back pain.    Cardiovascular:      Rate and Rhythm: Normal rate and regular rhythm.      Pulses: Normal pulses.      Heart sounds: Normal heart sounds.   Pulmonary:      Effort: Pulmonary effort is normal.      Breath sounds: Normal breath sounds.   Abdominal:      General: Abdomen is flat. Bowel sounds are normal.      Palpations: Abdomen is soft.   Musculoskeletal:      Comments: RUE with known lymphedema on exam.    Neurological:      General: No focal deficit present.      Mental Status: He is oriented to person, place, and time.   Psychiatric:         Mood and Affect: Mood normal.      Comments: Anxious related to MVC.       Significant Labs:   All pertinent labs from the last 24 hours have been reviewed.    Diagnostic Results:  I have reviewed all pertinent imaging results/findings within the past 24 hours.

## 2023-01-10 VITALS
BODY MASS INDEX: 43.28 KG/M2 | TEMPERATURE: 98 F | SYSTOLIC BLOOD PRESSURE: 126 MMHG | WEIGHT: 315 LBS | RESPIRATION RATE: 17 BRPM | DIASTOLIC BLOOD PRESSURE: 71 MMHG | HEART RATE: 67 BPM | OXYGEN SATURATION: 94 %

## 2023-01-10 LAB
ALBUMIN SERPL BCP-MCNC: 3.2 G/DL (ref 3.5–5.2)
ALP SERPL-CCNC: 87 U/L (ref 55–135)
ALT SERPL W/O P-5'-P-CCNC: 15 U/L (ref 10–44)
ANION GAP SERPL CALC-SCNC: 8 MMOL/L (ref 8–16)
ANISOCYTOSIS BLD QL SMEAR: SLIGHT
AST SERPL-CCNC: 17 U/L (ref 10–40)
BASOPHILS NFR BLD: 0 % (ref 0–1.9)
BILIRUB SERPL-MCNC: 0.8 MG/DL (ref 0.1–1)
BUN SERPL-MCNC: 12 MG/DL (ref 6–20)
CALCIUM SERPL-MCNC: 8.8 MG/DL (ref 8.7–10.5)
CHLORIDE SERPL-SCNC: 104 MMOL/L (ref 95–110)
CO2 SERPL-SCNC: 27 MMOL/L (ref 23–29)
CREAT SERPL-MCNC: 1 MG/DL (ref 0.5–1.4)
DIFFERENTIAL METHOD: ABNORMAL
EOSINOPHIL NFR BLD: 2 % (ref 0–8)
ERYTHROCYTE [DISTWIDTH] IN BLOOD BY AUTOMATED COUNT: 18.2 % (ref 11.5–14.5)
EST. GFR  (NO RACE VARIABLE): >60 ML/MIN/1.73 M^2
GLUCOSE SERPL-MCNC: 90 MG/DL (ref 70–110)
HCT VFR BLD AUTO: 29.2 % (ref 40–54)
HGB BLD-MCNC: 9.2 G/DL (ref 14–18)
IMM GRANULOCYTES # BLD AUTO: ABNORMAL K/UL (ref 0–0.04)
IMM GRANULOCYTES NFR BLD AUTO: ABNORMAL % (ref 0–0.5)
LYMPHOCYTES NFR BLD: 29 % (ref 18–48)
MAGNESIUM SERPL-MCNC: 2 MG/DL (ref 1.6–2.6)
MCH RBC QN AUTO: 25.3 PG (ref 27–31)
MCHC RBC AUTO-ENTMCNC: 31.5 G/DL (ref 32–36)
MCV RBC AUTO: 80 FL (ref 82–98)
MONOCYTES NFR BLD: 5 % (ref 4–15)
NEUTROPHILS NFR BLD: 64 % (ref 38–73)
NRBC BLD-RTO: 0 /100 WBC
PHOSPHATE SERPL-MCNC: 3.9 MG/DL (ref 2.7–4.5)
PLATELET # BLD AUTO: 215 K/UL (ref 150–450)
PLATELET BLD QL SMEAR: ABNORMAL
PMV BLD AUTO: 10.2 FL (ref 9.2–12.9)
POCT GLUCOSE: 124 MG/DL (ref 70–110)
POCT GLUCOSE: 94 MG/DL (ref 70–110)
POTASSIUM SERPL-SCNC: 3.9 MMOL/L (ref 3.5–5.1)
PROT SERPL-MCNC: 6.8 G/DL (ref 6–8.4)
RBC # BLD AUTO: 3.63 M/UL (ref 4.6–6.2)
SODIUM SERPL-SCNC: 139 MMOL/L (ref 136–145)
WBC # BLD AUTO: 1.75 K/UL (ref 3.9–12.7)

## 2023-01-10 PROCEDURE — 63600175 PHARM REV CODE 636 W HCPCS: Performed by: STUDENT IN AN ORGANIZED HEALTH CARE EDUCATION/TRAINING PROGRAM

## 2023-01-10 PROCEDURE — 96376 TX/PRO/DX INJ SAME DRUG ADON: CPT

## 2023-01-10 PROCEDURE — 82962 GLUCOSE BLOOD TEST: CPT

## 2023-01-10 PROCEDURE — 97535 SELF CARE MNGMENT TRAINING: CPT

## 2023-01-10 PROCEDURE — 85007 BL SMEAR W/DIFF WBC COUNT: CPT | Mod: NCS | Performed by: STUDENT IN AN ORGANIZED HEALTH CARE EDUCATION/TRAINING PROGRAM

## 2023-01-10 PROCEDURE — 25000003 PHARM REV CODE 250: Performed by: STUDENT IN AN ORGANIZED HEALTH CARE EDUCATION/TRAINING PROGRAM

## 2023-01-10 PROCEDURE — 80053 COMPREHEN METABOLIC PANEL: CPT | Performed by: STUDENT IN AN ORGANIZED HEALTH CARE EDUCATION/TRAINING PROGRAM

## 2023-01-10 PROCEDURE — 96372 THER/PROPH/DIAG INJ SC/IM: CPT | Mod: 59 | Performed by: STUDENT IN AN ORGANIZED HEALTH CARE EDUCATION/TRAINING PROGRAM

## 2023-01-10 PROCEDURE — 83735 ASSAY OF MAGNESIUM: CPT | Performed by: STUDENT IN AN ORGANIZED HEALTH CARE EDUCATION/TRAINING PROGRAM

## 2023-01-10 PROCEDURE — 90832 PSYTX W PT 30 MINUTES: CPT | Mod: ,,, | Performed by: PSYCHOLOGIST

## 2023-01-10 PROCEDURE — 90832 PR PSYCHOTHERAPY W/PATIENT, 30 MIN: ICD-10-PCS | Mod: ,,, | Performed by: PSYCHOLOGIST

## 2023-01-10 PROCEDURE — 84100 ASSAY OF PHOSPHORUS: CPT | Performed by: STUDENT IN AN ORGANIZED HEALTH CARE EDUCATION/TRAINING PROGRAM

## 2023-01-10 PROCEDURE — 97161 PT EVAL LOW COMPLEX 20 MIN: CPT

## 2023-01-10 PROCEDURE — G0378 HOSPITAL OBSERVATION PER HR: HCPCS

## 2023-01-10 PROCEDURE — 97165 OT EVAL LOW COMPLEX 30 MIN: CPT

## 2023-01-10 PROCEDURE — 85027 COMPLETE CBC AUTOMATED: CPT | Performed by: STUDENT IN AN ORGANIZED HEALTH CARE EDUCATION/TRAINING PROGRAM

## 2023-01-10 PROCEDURE — 99239 HOSP IP/OBS DSCHRG MGMT >30: CPT | Mod: GC,,, | Performed by: HOSPITALIST

## 2023-01-10 PROCEDURE — 97116 GAIT TRAINING THERAPY: CPT

## 2023-01-10 PROCEDURE — 99239 PR HOSPITAL DISCHARGE DAY,>30 MIN: ICD-10-PCS | Mod: GC,,, | Performed by: HOSPITALIST

## 2023-01-10 RX ORDER — LIDOCAINE 50 MG/G
1 PATCH TOPICAL DAILY
Qty: 7 PATCH | Refills: 0 | Status: SHIPPED | OUTPATIENT
Start: 2023-01-10 | End: 2023-02-09

## 2023-01-10 RX ORDER — CYCLOBENZAPRINE HCL 5 MG
5 TABLET ORAL 3 TIMES DAILY PRN
Qty: 30 TABLET | Refills: 0 | Status: SHIPPED | OUTPATIENT
Start: 2023-01-10 | End: 2023-01-20

## 2023-01-10 RX ORDER — OXYCODONE HYDROCHLORIDE 5 MG/1
5 TABLET ORAL EVERY 4 HOURS PRN
Qty: 35 TABLET | Refills: 0 | Status: SHIPPED | OUTPATIENT
Start: 2023-01-10 | End: 2023-01-17

## 2023-01-10 RX ORDER — POLYETHYLENE GLYCOL 3350 17 G/17G
17 POWDER, FOR SOLUTION ORAL DAILY
Qty: 238 G | Refills: 0 | Status: SHIPPED | OUTPATIENT
Start: 2023-01-11 | End: 2023-02-09

## 2023-01-10 RX ORDER — HEPARIN 100 UNIT/ML
5 SYRINGE INTRAVENOUS ONCE AS NEEDED
Status: COMPLETED | OUTPATIENT
Start: 2023-01-10 | End: 2023-01-10

## 2023-01-10 RX ADMIN — ENOXAPARIN SODIUM 40 MG: 40 INJECTION SUBCUTANEOUS at 09:01

## 2023-01-10 RX ADMIN — GABAPENTIN 300 MG: 300 CAPSULE ORAL at 02:01

## 2023-01-10 RX ADMIN — AMLODIPINE BESYLATE 10 MG: 10 TABLET ORAL at 09:01

## 2023-01-10 RX ADMIN — LOSARTAN POTASSIUM 50 MG: 50 TABLET, FILM COATED ORAL at 09:01

## 2023-01-10 RX ADMIN — HYDROMORPHONE HYDROCHLORIDE 1 MG: 1 INJECTION, SOLUTION INTRAMUSCULAR; INTRAVENOUS; SUBCUTANEOUS at 09:01

## 2023-01-10 RX ADMIN — GABAPENTIN 300 MG: 300 CAPSULE ORAL at 09:01

## 2023-01-10 RX ADMIN — HYDROCODONE BITARTRATE AND ACETAMINOPHEN 1 TABLET: 10; 325 TABLET ORAL at 12:01

## 2023-01-10 RX ADMIN — HEPARIN 500 UNITS: 100 SYRINGE at 04:01

## 2023-01-10 RX ADMIN — FLUOXETINE 40 MG: 20 CAPSULE ORAL at 09:01

## 2023-01-10 RX ADMIN — HYDROCODONE BITARTRATE AND ACETAMINOPHEN 1 TABLET: 10; 325 TABLET ORAL at 04:01

## 2023-01-10 RX ADMIN — HYDROCHLOROTHIAZIDE 25 MG: 25 TABLET ORAL at 09:01

## 2023-01-10 NOTE — PLAN OF CARE
Pt discharged to home per MD order. Discharge instructions and prescriptions given and explained to pt. Pt verbalized understanding d/c instructions and prescription education. Left chest port; heparin flushed and de-accessed. Site clean, dry, and intact. Pt ambulating in room with steady gait; tolerating regular diet; voiding without difficulty; pain well-controlled with PO pain meds.  All VSS; O2 sats WNL. Pt left floor by wheelchair via hospital transportation; accompanied by son from the floor.

## 2023-01-10 NOTE — PT/OT/SLP EVAL
"Physical Therapy Co-Evaluation with OT and Treatment     Patient Name:  Paul Li Jr.  MRN: [id number]    Admit Date: 1/9/2023  Admitting Diagnosis:  <principal problem not specified>  Length of Stay: 1 days  Recent Surgery: * No surgery found *      Recommendations:     Discharge Recommendations: Other  Equipment recommendations: rolling walker and bedside commode  Barriers to discharge: None Identified     Assessment:     Paul Li Jr. is a 47 y.o. male [place] [facility] [place]23 with medical diagnosis of <principal problem not specified>. Pt presents with weakness, impaired endurance, impaired self care skills, impaired functional mobility, gait instability, impaired balance, decreased lower extremity function, decreased coordination, pain, orthopedic precautions, edema. These deficits effect their roles and responsibilities in which they were able to complete prior to admit.   PTA, pt was (I) with ambulation and ADLs. Pt has been going to an OPPT that specializes in lymphedema management for his RUE 2/2 R mastectomy. R arm and leg are currently edematous. Pt educated on spinal precautions for comfort and able to perform log roll. Pt ambulate ~30 ft with RW very slowly and trialed ~4 ft utilizing R HR. Pt felt "sharp, shooting" familiar pain in his L quad causing a mild LOB. Educated pt to use RW upon d/c. Will continue to assess functional mobility.   Paul Li Jr. would benefit from acute PT intervention to improve quality of life, focus on recovery of impairments, provide patient/caregiver education, reduce fall risk, and maximize (I) and safety with functional mobility. Once medically stable, recommending pt discharge to resume lymphedema therapy at outpatient location.    Rehab Prognosis: Good    Plan:     During this hospitalization, patient to be seen 3 x/week to address the identified rehab impairments via therapeutic activities, therapeutic exercises, gait training, neuromuscular re-education and " "progress towards stated goals.     Plan of Care Expires:  02/09/23  Plan of Care reviewed with: patient, spouse    This plan of care has been discussed with the patient/caregiver, who was included in its development and is in agreement with the identified goals and treatment plan.     Subjective     Communicated with RN prior to session.  Patient found supine upon PT entry to room, agreeable to evaluation. Pt's wife present during session.    Chief Complaint: mid-low back pain    Patient/Family Comments/goals: "I am really hoping to go home"    Pain/Comfort:  Pain Rating 1: 7/10  Location - Side 1: Bilateral  Location - Orientation 1: lower  Location 1: back  Pain Addressed 1: Distraction, Reposition, Cessation of Activity  Pain Rating Post-Intervention 1: 7/10    Patients cultural, spiritual, Druze conflicts given the current situation: None identified     Patient History: information obtained from pt     Living Environment: Pt lives with wife and 25 yo son in first floor apartment  with threshold CHARITY. Bathroom set-up: tub/shower combo  Prior Level of Function: independent with mobility and ADLs; drives; not working; enjoys working on car stereos  DME owned: transfer tub bench  Support Available/Caregiver Assistance:  wife and son can (A)    Objective:       Recent Surgery: * No surgery found *    General Precautions: Standard, fall   Orthopedic Precautions:spinal precautions (for comfort)   Braces: N/A   Oxygen Device: room air      Exams:    Cognition:  Alert  Command following: Follows multistep verbal commands  Communication: clear/fluent    Sensation:   Light touch sensation: Pt reported generalized numbness/tingling of miles feet 2/2 CIPN    Gross Motor Coordination: No deficits noted during functional mobility tasks     Edema/Skin Integrity: Moderate RUE and RLE; Visible skin intact    Postural examination/scapula alignment: Rounded shoulder and Head forward    Lower Extremity Range of Motion:  Right " Lower Extremity: WFL  Left Lower Extremity: WFL    Lower Extremity Strength:  Right Lower Extremity: WFL  Left Lower Extremity: WFL    Functional Mobility:    Bed Mobility:  Supine > Sit with stand by assistance  V/c for log roll technique    Transfers:   Sit <> Stand Transfer: Contact Guard Assistance x 1 trials from eob with RW AD   Increased time required to achieve upright standing              Gait:  Distance: ~30 ft with RW + ~4 ft with R HR  Assistance level: Contact Guard Assistance - Minimal Assistance  Assistive Device: rolling walker  Gait Assessment: decreased gait speed; decreased step length; decreased pretty; minor LOB when trialing gait without RW 2/2 familiar shooting pain down L thigh    Balance:  Dynamic Sitting: FAIR+: Maintains balance through MINIMAL excursions of active trunk motion  Standing:  Static: FAIR+: Takes MINIMAL challenges from all directions   Dynamic: FAIR: Needs CONTACT GUARD during gait    Outcome Measure: AM-PAC 6 CLICK MOBILITY  Total Score:18     Patient/Caregiver Education:     Therapist educated pt/caregiver regarding:   PT POC and goals for therapy   Safety with mobility and fall risk   Instruction on use of call button and importance of calling nursing staff for assistance with mobility   Time provided for therapeutic counseling and discussion of current health disposition. All questions answered to satisfaction, within scope of PT practice     Patient/caregiver able to verbalize understanding and expressed no further questions this visit; will follow-up with pt/caregiver during current admit for additional questions/concerns within scope of practice.     White board updated.     Patient left sitting edge of bed with all lines intact, call button in reach, and wife present.    GOALS:   Multidisciplinary Problems       Physical Therapy Goals          Problem: Physical Therapy    Goal Priority Disciplines Outcome Goal Variances Interventions   Physical Therapy Goal     PT,  PT/OT Ongoing, Progressing     Description: Goals to be met by: 23     Patient will increase functional independence with mobility by performin. Sit to stand transfer with Supervision  2. Bed to chair transfer with Supervision using Rolling Walker  3. Gait  x 150 feet with Supervision using Rolling Walker.   4. Ascend/Descend 6 inch curb step with Stand-by Assistance using Rolling Walker.                           History:     Past Medical History:   Diagnosis Date    Breast cancer     Cancer     R breast    Diabetes mellitus     HTN (hypertension)     Leg edema, right     Prediabetes     Seizures     at age 18 - stress related    Therapy     marital counseling       Past Surgical History:   Procedure Laterality Date    AXILLARY NODE DISSECTION Right 2019    Procedure: LYMPHADENECTOMY, AXILLARY RIGHT;  Surgeon: Shima Whyte MD;  Location: [facility];  Service: General;  Laterality: Right;    AXILLARY NODE DISSECTION Right 2019    Procedure: LYMPHADENECTOMY, AXILLARY;  Surgeon: Shima Whyte MD;  Location: [facility] OR [address] [place] [address];  Service: General;  Laterality: Right;    BREAST BIOPSY      EXCISION OF HYDROCELE Right 10/28/2022    Procedure: HYDROCELECTOMY;  Surgeon: Anthony Cordero Jr., MD;  Location: [facility] OR [address] [place] [address];  Service: Urology;  Laterality: Right;  1 hour    INSERTION OF TUNNELED CENTRAL VENOUS CATHETER (CVC) WITH SUBCUTANEOUS PORT Left 2019    Procedure: GHIYINMIH-OSAV-C-CATH;  Surgeon: Shima Whyte MD;  Location: [facility] OR [address] [place] [address];  Service: General;  Laterality: Left;    INSERTION OF TUNNELED CENTRAL VENOUS CATHETER (CVC) WITH SUBCUTANEOUS PORT Left 2021    Procedure: INSERTION, SINGLE LUMEN CATHETER WITH PORT, WITH FLUOROSCOPIC GUIDANCE, right;  Surgeon: Gloria Holliday MD;  Location: [address];  Service: General;  Laterality: Left;  rapid covid test    SENTINEL LYMPH NODE BIOPSY Right 2019    Procedure: BIOPSY, LYMPH NODE, SENTINEL RIGHT;  Surgeon: Shima Whyte  MD;  Location: [facility];  Service: General;  Laterality: Right;    SIMPLE MASTECTOMY Right 11/22/2019    Procedure: MASTECTOMY, SIMPLE RIGHT (CONSENT AM OF) 2.5 hr case;  Surgeon: Shima Whyte MD;  Location: [facility];  Service: General;  Laterality: Right;       Time Tracking:     PT Received On: 01/10/23  PT Start Time: 0812     PT Stop Time: 0841  PT Total Time (min): 29 min     Billable Minutes: Evaluation 8 and Gait Training 21    01/10/2023

## 2023-01-10 NOTE — ASSESSMENT & PLAN NOTE
Home regimen includes Metformin and Trulicity, no currently on Insulin    Plan:  Hold home regimen at this time  Sliding scale post-prandial insulin during admit, adjust as needed

## 2023-01-10 NOTE — ED NOTES
Patient care assumed. Patient resting comfortably at this time. Respirations even and unlabored. Chest rises and falls evenly. Family at bedside. White board updated with patient plan of care.
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
## 2023-01-10 NOTE — HPI
Paul Li Jr., a 47 y.o. male, for therapy visit.  Met with patient.    Chief Complaint/Reason for Encounter: anxiety, adaptation to disease and treatment

## 2023-01-10 NOTE — ASSESSMENT & PLAN NOTE
Patient coping well, overall.   Will increase efforts for targeted relaxation over the next few days.  RTC outpatient, as needed.
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
## 2023-01-10 NOTE — PLAN OF CARE
PT evaluation complete - see note for details. POC and goals established.    Problem: Physical Therapy  Goal: Physical Therapy Goal  Description: Goals to be met by: 23     Patient will increase functional independence with mobility by performin. Sit to stand transfer with Supervision  2. Bed to chair transfer with Supervision using Rolling Walker  3. Gait  x 150 feet with Supervision using Rolling Walker.   4. Ascend/Descend 6 inch curb step with Stand-by Assistance using Rolling Walker.    Outcome: Ongoing, Progressing     1/10/2023
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
## 2023-01-10 NOTE — PLAN OF CARE
Problem: Occupational Therapy  Goal: Occupational Therapy Goal  Description: Goals to be met by: 1/17/2023 (1 week)     Patient will increase functional independence with ADLs by performing:    UE Dressing with Supervision.  LE Dressing with Supervision.  Grooming while standing at sink with Supervision.  Toileting from toilet with Supervision for hygiene and clothing management.   Rolling to Bilateral with [place].   Supine to sit with [place].  Step transfer with Supervision  Toilet transfer to toilet with Supervision.    Evaluated pt and established OT POC. Continue OT as tolerated.  Miryam Wolfe OT  1/10/2023    Outcome: Ongoing, Progressing
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
## 2023-01-10 NOTE — ASSESSMENT & PLAN NOTE
Patient follows with Dr. Linn  S/P Cycle 16 of Abraxane/ Alpelesib for TNBC  Last cycle on 1/6/2023    Plan:  Follow-up as scheduled with Dr. Linn on DC  Further evaluation of pathologic fracture as needed based on clinical course

## 2023-01-11 ENCOUNTER — PATIENT MESSAGE (OUTPATIENT)
Dept: HEMATOLOGY/ONCOLOGY | Facility: CLINIC | Age: 48
End: 2023-01-11
Payer: MEDICARE

## 2023-01-11 ENCOUNTER — TELEPHONE (OUTPATIENT)
Dept: HEMATOLOGY/ONCOLOGY | Facility: CLINIC | Age: 48
End: 2023-01-11
Payer: MEDICARE

## 2023-01-11 NOTE — TELEPHONE ENCOUNTER
"----- Message from Jenniferdenise Guido sent at 1/11/2023  9:15 AM CST -----  Regarding: Letter of Medical Neccesity  Consult/Advisory    Name Of Caller: Roseynevaeh devine [employer]    Contact Preference?: [phone]    Fax [phone]    What is the nature of the call?: has made several attempts to reach someone to get the signed Letter of Medical Necessity back from the doctor and has not received a call back           Additional Notes:  "Thank you for all that you do for our patients'"     "

## 2023-01-12 ENCOUNTER — TELEPHONE (OUTPATIENT)
Dept: HEMATOLOGY/ONCOLOGY | Facility: CLINIC | Age: 48
End: 2023-01-12
Payer: MEDICARE

## 2023-01-17 ENCOUNTER — TELEPHONE (OUTPATIENT)
Dept: HEMATOLOGY/ONCOLOGY | Facility: CLINIC | Age: 48
End: 2023-01-17
Payer: MEDICARE

## 2023-01-17 NOTE — TELEPHONE ENCOUNTER
"----- Message from Skyler Garcia sent at 1/17/2023 11:04 AM CST -----  Consult/Advisory:          Name Of Caller:  Rosey ([employer]      Contact Preference?: [phone]    Fax [phone]      Provider Name: [facility]      Does patient feel the need to be seen today? No      What is the nature of the call?: Requesting letter of medical necessity. Stating this is her fourth attempt she's reached out          Additional Notes:  "Thank you for all that you do for our patients"      "

## 2023-01-18 ENCOUNTER — LAB VISIT (OUTPATIENT)
Dept: LAB | Facility: HOSPITAL | Age: 48
End: 2023-01-18
Attending: INTERNAL MEDICINE
Payer: MEDICARE

## 2023-01-18 ENCOUNTER — OFFICE VISIT (OUTPATIENT)
Dept: HEMATOLOGY/ONCOLOGY | Facility: CLINIC | Age: 48
End: 2023-01-18
Payer: MEDICARE

## 2023-01-18 VITALS
HEIGHT: 73 IN | DIASTOLIC BLOOD PRESSURE: 73 MMHG | BODY MASS INDEX: 41.75 KG/M2 | RESPIRATION RATE: 17 BRPM | TEMPERATURE: 98 F | OXYGEN SATURATION: 100 % | HEART RATE: 66 BPM | SYSTOLIC BLOOD PRESSURE: 134 MMHG | WEIGHT: 315 LBS

## 2023-01-18 DIAGNOSIS — G89.3 NEOPLASM RELATED PAIN: ICD-10-CM

## 2023-01-18 DIAGNOSIS — E11.65 UNCONTROLLED TYPE 2 DIABETES MELLITUS WITH HYPERGLYCEMIA: ICD-10-CM

## 2023-01-18 DIAGNOSIS — T45.1X5A ANEMIA ASSOCIATED WITH CHEMOTHERAPY: ICD-10-CM

## 2023-01-18 DIAGNOSIS — C79.51 BONE METASTASES: ICD-10-CM

## 2023-01-18 DIAGNOSIS — T45.1X5A CHEMOTHERAPY-INDUCED NEUTROPENIA: ICD-10-CM

## 2023-01-18 DIAGNOSIS — C50.919 BREAST CANCER: ICD-10-CM

## 2023-01-18 DIAGNOSIS — D70.1 CHEMOTHERAPY-INDUCED NEUTROPENIA: ICD-10-CM

## 2023-01-18 DIAGNOSIS — Z17.1 MALIGNANT NEOPLASM INVOLVING BOTH NIPPLE AND AREOLA OF RIGHT BREAST IN MALE, ESTROGEN RECEPTOR NEGATIVE: Primary | ICD-10-CM

## 2023-01-18 DIAGNOSIS — D64.81 ANEMIA ASSOCIATED WITH CHEMOTHERAPY: ICD-10-CM

## 2023-01-18 DIAGNOSIS — N43.3 HYDROCELE IN ADULT: ICD-10-CM

## 2023-01-18 DIAGNOSIS — C50.021 MALIGNANT NEOPLASM INVOLVING BOTH NIPPLE AND AREOLA OF RIGHT BREAST IN MALE, ESTROGEN RECEPTOR NEGATIVE: Primary | ICD-10-CM

## 2023-01-18 DIAGNOSIS — I10 ESSENTIAL HYPERTENSION: ICD-10-CM

## 2023-01-18 LAB
ALBUMIN SERPL BCP-MCNC: 3.8 G/DL (ref 3.5–5.2)
ALP SERPL-CCNC: 101 U/L (ref 55–135)
ALT SERPL W/O P-5'-P-CCNC: 19 U/L (ref 10–44)
ANION GAP SERPL CALC-SCNC: 9 MMOL/L (ref 8–16)
AST SERPL-CCNC: 18 U/L (ref 10–40)
BASOPHILS # BLD AUTO: 0.02 K/UL (ref 0–0.2)
BASOPHILS NFR BLD: 0.5 % (ref 0–1.9)
BILIRUB SERPL-MCNC: 0.9 MG/DL (ref 0.1–1)
BUN SERPL-MCNC: 12 MG/DL (ref 6–20)
CALCIUM SERPL-MCNC: 9.4 MG/DL (ref 8.7–10.5)
CHLORIDE SERPL-SCNC: 106 MMOL/L (ref 95–110)
CO2 SERPL-SCNC: 25 MMOL/L (ref 23–29)
CREAT SERPL-MCNC: 1.2 MG/DL (ref 0.5–1.4)
DIFFERENTIAL METHOD: ABNORMAL
EOSINOPHIL # BLD AUTO: 0 K/UL (ref 0–0.5)
EOSINOPHIL NFR BLD: 0.2 % (ref 0–8)
ERYTHROCYTE [DISTWIDTH] IN BLOOD BY AUTOMATED COUNT: 18.6 % (ref 11.5–14.5)
EST. GFR  (NO RACE VARIABLE): >60 ML/MIN/1.73 M^2
GLUCOSE SERPL-MCNC: 120 MG/DL (ref 70–110)
HCT VFR BLD AUTO: 35.4 % (ref 40–54)
HGB BLD-MCNC: 10.7 G/DL (ref 14–18)
IMM GRANULOCYTES # BLD AUTO: 0.04 K/UL (ref 0–0.04)
IMM GRANULOCYTES NFR BLD AUTO: 1 % (ref 0–0.5)
LYMPHOCYTES # BLD AUTO: 1.1 K/UL (ref 1–4.8)
LYMPHOCYTES NFR BLD: 25.7 % (ref 18–48)
MCH RBC QN AUTO: 24.7 PG (ref 27–31)
MCHC RBC AUTO-ENTMCNC: 30.2 G/DL (ref 32–36)
MCV RBC AUTO: 82 FL (ref 82–98)
MONOCYTES # BLD AUTO: 0.4 K/UL (ref 0.3–1)
MONOCYTES NFR BLD: 9.9 % (ref 4–15)
NEUTROPHILS # BLD AUTO: 2.6 K/UL (ref 1.8–7.7)
NEUTROPHILS NFR BLD: 62.7 % (ref 38–73)
NRBC BLD-RTO: 0 /100 WBC
PLATELET # BLD AUTO: 276 K/UL (ref 150–450)
PMV BLD AUTO: 10.1 FL (ref 9.2–12.9)
POTASSIUM SERPL-SCNC: 4.4 MMOL/L (ref 3.5–5.1)
PROT SERPL-MCNC: 8 G/DL (ref 6–8.4)
RBC # BLD AUTO: 4.33 M/UL (ref 4.6–6.2)
SODIUM SERPL-SCNC: 140 MMOL/L (ref 136–145)
WBC # BLD AUTO: 4.16 K/UL (ref 3.9–12.7)

## 2023-01-18 PROCEDURE — 99215 OFFICE O/P EST HI 40 MIN: CPT | Mod: S$GLB,,, | Performed by: NURSE PRACTITIONER

## 2023-01-18 PROCEDURE — 99499 UNLISTED E&M SERVICE: CPT | Mod: S$GLB,,, | Performed by: NURSE PRACTITIONER

## 2023-01-18 PROCEDURE — 3008F BODY MASS INDEX DOCD: CPT | Mod: CPTII,S$GLB,, | Performed by: NURSE PRACTITIONER

## 2023-01-18 PROCEDURE — 3075F SYST BP GE 130 - 139MM HG: CPT | Mod: CPTII,S$GLB,, | Performed by: NURSE PRACTITIONER

## 2023-01-18 PROCEDURE — 99215 PR OFFICE/OUTPT VISIT, EST, LEVL V, 40-54 MIN: ICD-10-PCS | Mod: S$GLB,,, | Performed by: NURSE PRACTITIONER

## 2023-01-18 PROCEDURE — 80053 COMPREHEN METABOLIC PANEL: CPT | Performed by: INTERNAL MEDICINE

## 2023-01-18 PROCEDURE — 3075F PR MOST RECENT SYSTOLIC BLOOD PRESS GE 130-139MM HG: ICD-10-PCS | Mod: CPTII,S$GLB,, | Performed by: NURSE PRACTITIONER

## 2023-01-18 PROCEDURE — 99999 PR PBB SHADOW E&M-EST. PATIENT-LVL III: CPT | Mod: PBBFAC,,, | Performed by: NURSE PRACTITIONER

## 2023-01-18 PROCEDURE — 1111F PR DISCHARGE MEDS RECONCILED W/ CURRENT OUTPATIENT MED LIST: ICD-10-PCS | Mod: CPTII,S$GLB,, | Performed by: NURSE PRACTITIONER

## 2023-01-18 PROCEDURE — 99499 RISK ADDL DX/OHS AUDIT: ICD-10-PCS | Mod: S$GLB,,, | Performed by: NURSE PRACTITIONER

## 2023-01-18 PROCEDURE — 3078F DIAST BP <80 MM HG: CPT | Mod: CPTII,S$GLB,, | Performed by: NURSE PRACTITIONER

## 2023-01-18 PROCEDURE — 36415 COLL VENOUS BLD VENIPUNCTURE: CPT | Performed by: INTERNAL MEDICINE

## 2023-01-18 PROCEDURE — 85025 COMPLETE CBC W/AUTO DIFF WBC: CPT | Performed by: INTERNAL MEDICINE

## 2023-01-18 PROCEDURE — 3008F PR BODY MASS INDEX (BMI) DOCUMENTED: ICD-10-PCS | Mod: CPTII,S$GLB,, | Performed by: NURSE PRACTITIONER

## 2023-01-18 PROCEDURE — 1159F PR MEDICATION LIST DOCUMENTED IN MEDICAL RECORD: ICD-10-PCS | Mod: CPTII,S$GLB,, | Performed by: NURSE PRACTITIONER

## 2023-01-18 PROCEDURE — 3078F PR MOST RECENT DIASTOLIC BLOOD PRESSURE < 80 MM HG: ICD-10-PCS | Mod: CPTII,S$GLB,, | Performed by: NURSE PRACTITIONER

## 2023-01-18 PROCEDURE — 1159F MED LIST DOCD IN RCRD: CPT | Mod: CPTII,S$GLB,, | Performed by: NURSE PRACTITIONER

## 2023-01-18 PROCEDURE — 1111F DSCHRG MED/CURRENT MED MERGE: CPT | Mod: CPTII,S$GLB,, | Performed by: NURSE PRACTITIONER

## 2023-01-18 PROCEDURE — 99999 PR PBB SHADOW E&M-EST. PATIENT-LVL III: ICD-10-PCS | Mod: PBBFAC,,, | Performed by: NURSE PRACTITIONER

## 2023-01-18 RX ORDER — SODIUM CHLORIDE 9 MG/ML
INJECTION, SOLUTION INTRAVENOUS CONTINUOUS
Status: CANCELLED | OUTPATIENT
Start: 2023-01-24

## 2023-01-18 RX ORDER — SODIUM CHLORIDE 0.9 % (FLUSH) 0.9 %
10 SYRINGE (ML) INJECTION
Status: CANCELLED | OUTPATIENT
Start: 2023-01-24

## 2023-01-18 RX ORDER — HEPARIN 100 UNIT/ML
500 SYRINGE INTRAVENOUS
Status: CANCELLED | OUTPATIENT
Start: 2023-01-24
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
## 2023-01-18 NOTE — Clinical Note
"Hi! His CT spine in hospital reported "There is mild loss of height of the left side of the T12 vertebrae where there is a sclerotic metastases suggestive of a mild pathologic fracture". He is not having any pain currently and I suggested we monitor for now. Do you agree or should we have him see ortho? "

## 2023-01-20 ENCOUNTER — TELEPHONE (OUTPATIENT)
Dept: HEMATOLOGY/ONCOLOGY | Facility: CLINIC | Age: 48
End: 2023-01-20
Payer: MEDICARE

## 2023-01-23 DIAGNOSIS — G62.0 CHEMOTHERAPY-INDUCED NEUROPATHY: ICD-10-CM

## 2023-01-23 DIAGNOSIS — T45.1X5A CHEMOTHERAPY-INDUCED NEUROPATHY: ICD-10-CM

## 2023-01-23 RX ORDER — GABAPENTIN 300 MG/1
300 CAPSULE ORAL 3 TIMES DAILY
Qty: 90 CAPSULE | Refills: 11 | Status: SHIPPED | OUTPATIENT
Start: 2023-01-23 | End: 2023-05-11

## 2023-01-26 ENCOUNTER — CLINICAL SUPPORT (OUTPATIENT)
Dept: REHABILITATION | Facility: HOSPITAL | Age: 48
End: 2023-01-26
Payer: MEDICARE

## 2023-01-26 DIAGNOSIS — I89.0 LYMPHEDEMA OF RIGHT ARM: Primary | ICD-10-CM

## 2023-01-26 DIAGNOSIS — Z74.09 IMPAIRED FUNCTIONAL MOBILITY AND ACTIVITY TOLERANCE: ICD-10-CM

## 2023-01-26 PROCEDURE — 97140 MANUAL THERAPY 1/> REGIONS: CPT
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
## 2023-01-27 ENCOUNTER — INFUSION (OUTPATIENT)
Dept: INFUSION THERAPY | Facility: HOSPITAL | Age: 48
End: 2023-01-27
Payer: MEDICARE

## 2023-01-27 VITALS
WEIGHT: 315 LBS | DIASTOLIC BLOOD PRESSURE: 64 MMHG | RESPIRATION RATE: 18 BRPM | TEMPERATURE: 98 F | SYSTOLIC BLOOD PRESSURE: 136 MMHG | HEART RATE: 78 BPM | BODY MASS INDEX: 44.79 KG/M2

## 2023-01-27 DIAGNOSIS — C50.021 MALIGNANT NEOPLASM INVOLVING BOTH NIPPLE AND AREOLA OF RIGHT BREAST IN MALE, ESTROGEN RECEPTOR NEGATIVE: Primary | ICD-10-CM

## 2023-01-27 DIAGNOSIS — Z17.1 MALIGNANT NEOPLASM INVOLVING BOTH NIPPLE AND AREOLA OF RIGHT BREAST IN MALE, ESTROGEN RECEPTOR NEGATIVE: Primary | ICD-10-CM

## 2023-01-27 PROCEDURE — 96413 CHEMO IV INFUSION 1 HR: CPT

## 2023-01-27 PROCEDURE — A4216 STERILE WATER/SALINE, 10 ML: HCPCS | Performed by: NURSE PRACTITIONER

## 2023-01-27 PROCEDURE — 63600175 PHARM REV CODE 636 W HCPCS: Performed by: NURSE PRACTITIONER

## 2023-01-27 PROCEDURE — 25000003 PHARM REV CODE 250: Performed by: NURSE PRACTITIONER

## 2023-01-27 PROCEDURE — 96361 HYDRATE IV INFUSION ADD-ON: CPT

## 2023-01-27 RX ORDER — SODIUM CHLORIDE 0.9 % (FLUSH) 0.9 %
10 SYRINGE (ML) INJECTION
Status: DISCONTINUED | OUTPATIENT
Start: 2023-01-27 | End: 2023-01-27 | Stop reason: HOSPADM

## 2023-01-27 RX ORDER — SODIUM CHLORIDE 9 MG/ML
INJECTION, SOLUTION INTRAVENOUS CONTINUOUS
Status: DISCONTINUED | OUTPATIENT
Start: 2023-01-27 | End: 2023-01-27 | Stop reason: HOSPADM

## 2023-01-27 RX ORDER — HEPARIN 100 UNIT/ML
500 SYRINGE INTRAVENOUS
Status: DISCONTINUED | OUTPATIENT
Start: 2023-01-27 | End: 2023-01-27 | Stop reason: HOSPADM

## 2023-01-27 RX ADMIN — PACLITAXEL 220 MG: 100 INJECTION, POWDER, LYOPHILIZED, FOR SUSPENSION INTRAVENOUS at 11:01

## 2023-01-27 RX ADMIN — SODIUM CHLORIDE: 0.9 INJECTION, SOLUTION INTRAVENOUS at 09:01

## 2023-01-27 RX ADMIN — Medication 10 ML: at 11:01

## 2023-01-27 RX ADMIN — HEPARIN 500 UNITS: 100 SYRINGE at 11:01

## 2023-01-27 NOTE — PLAN OF CARE
1200 pt tolerated abraxane infusion without issue, pt to rtc 2/3/23, no distress noted upon d/c to home

## 2023-01-27 NOTE — PLAN OF CARE
0850 pt here for D1C17 Abraxane infusion , labs, hx, meds, allergies reviewed, pt with no new complaints at this time, reclined in chair, continue to monitor

## 2023-01-30 ENCOUNTER — CLINICAL SUPPORT (OUTPATIENT)
Dept: REHABILITATION | Facility: HOSPITAL | Age: 48
End: 2023-01-30
Payer: MEDICARE

## 2023-01-30 DIAGNOSIS — I89.0 LYMPHEDEMA OF RIGHT ARM: Primary | ICD-10-CM

## 2023-01-30 DIAGNOSIS — Z74.09 IMPAIRED FUNCTIONAL MOBILITY AND ACTIVITY TOLERANCE: ICD-10-CM

## 2023-01-30 PROCEDURE — 97140 MANUAL THERAPY 1/> REGIONS: CPT

## 2023-01-30 NOTE — PROGRESS NOTES
"                                                    Physical Therapy Daily Treatment Note       Date: 1/30/2023   Name: Paul Li Jr.  Clinic Number: [id number]    Therapy Diagnosis:   Encounter Diagnoses   Name Primary?    Lymphedema of right arm Yes    Impaired functional mobility and activity tolerance      Physician: Rosa Linn MD    Physician Orders: PT Eval and Treat -RUE lymphedema  Medical Diagnosis: Malignant neoplasm involving both nipple and areola of right breast in male, estrogen receptor negative [C50.021, Z17.1], Lymphedema of right upper extremity   Evaluation Date: 10/10/2022  Authorization Period Expiration: 10/14/22  Updated Plan of Care Certification Period: 2/10/23 (12 weeks)  Visit # / Visits authorized: 15/ 15 (plus evaluation)  Insurance: [employer]  FOTO: 2/3     Time In: 11:10 AM   Time Out: 12:00 PM  Total Billable Time:50 minutes     Precautions: Standard, Fall, cancer, and Spine, Bony Mets, hx of Seizures, L chest wall port      Subjective     Pt reports: Had a rough reaction from his chemo on Friday.  He left compression bandages on until this morning.  He was compliant with self manual lymph drainage  Response to previous treatment: no adverse reaction  Pain Scale: Paul rates pain on a scale of 0/10 on VAS.   Pain location: N/A    Fatigue: "feel fine"  Functional change: none stated  Treatment:   Chemotherapy: te-adjuvant chemo therapy completed 10/19  Pt is currently on chemotherapy: pt is getting 1 infusion/week for 3 weeks with 1 week break, and he takes oral chemotherapy daily.  Radiation: Completed in February 2020  Surgery date: 12/17/19 pt underwent right axillary lymph node dissection and resection excess lateral tissue; 11/22/19 right simple mastectomy with SLNB; total of 18 lymph nodes removed      Objective     Objective Measures updated at progress report unless specified.     LANDMARK RIGHT UE LEFT UE DIFF RUE Diff RUE Diff RUE Diff RUE Diff RUE " "Diff RUE Diff RUE Diff RUE Diff RUE Diff RUE Diff RUE Diff   Date Eval Eval Eval 10/17/22 10/17/22 10/29/22 10/19/22 11/2/22 11/2/22 11/11/22 11/11/22 11/29/22 11/29/22 12/5/22 12/5/22 12/13/22 12/13/22 12/21/22 12/21/22 12/30/22 12/30/22 1/6/23 1/6/23 1/26/23 1/26/23   E + 8" 50 cm 46.5 cm 3.5 cm 48 cm -2 47.5 cm -0.5 50.5 cm +3 47.5 cm -3 49.5 cm +2 50.5 cm +1 48.5 cm -2 49 cm +0.5 48 cm -1 50 cm +2 48.5 cm -1.5   E + 6" 45.5 cm 44 cm 1.5 cm 45 cm -0.5 46.5 cm +1.5 47 cm +0.5 45 cm -2 45.5 cm +0.5 46 cm +0.5 45 cm -1 45.5 cm +0.5 45.5 cm No chg 47 cm +1.5 45 cm -2   E + 4" 40 cm 40.5 cm 0.5 cm 40.5 cm +0.5 40.5cm No chg 41 cm +0.5 40.5 cm -0.5 40.5 cm No chg 40 cm -0.5 41.5 cm +1.5  41 cm -0.5 41 cm No chg 42.5 cm +1.5 40 cm -2.5   E + 2" 38.5 cm 38.5 cm 0 cm 39 cm +0.5 40 cm +1 39.5 cm -0.5 38.5 cm -1 38 cm -0.5 39 cm +1 39 cm No chg 39 cm No chg 39 cm No chg 39.5 cm +0.5 38.5 cm -1   Elbow 36.5 cm 34 cm 2.5 cm 35.5 cm -1 35.5 cm No chg 36 cm +0.5 34.5 cm -1.5 36 cm +1.5 36 cm No chg 35 cm -1 36 cm +1 35.5 cm -0.5 37.5 cm +2 35 cm -2.5   W+ 8" 37.5cm 33 cm 4.5 cm 38cm +0.5 36.5 cm -1.5 37.5 cm +1 36.5 cm -1 37.5 cm +1 37.5 cm No chg 37 cm -0.5 37 cm No chg 37 cm No chg 38 cm +1 37 cm -1   W +  6" 35 cm 29.5 cm 5.5 cm 34 cm -1 34.5 cm +0.5 33.5 cm -1 32.5 cm -1 33 cm +0.5 33 cm No chg 33cm No chg 34.5 cm +1.5 34.5 cm No chg 36 cm +1.5 33.5 cm -2.5   W + 4" 31.5 cm 25 cm 6.5 cm 31.5 cm No chg 31.5 cm No chg 29.5 cm -2 29 cm -0.5 30 cm +1 28 cm -2 29 cm +1 29 cm No chg 29.5 cm +0.5 32 cm +2.5 30 cm -2   Wrist 23 cm 20 cm 3 cm 22.5cm -0.5 21.5 cm -1 23 cm +1.5 21 cm -2 24 cm +3 22 cm -2 22 cm No chg 22.5 cm +0.5 23 cm +0.5 23 cm No chg 22 cm -1   DPC 28.5 cm 26 cm 2.5 cm 28 cm -0.5 27.5cm -0.5 28.5 cm +1 27 cm -1.5 28 cm +1 27 cm -1 26.5 cm -0.5 28 cm +1.5 27.5 cm -0.5 27 cm -0.5 28 cm +1   IP Thumb 8.5 cm 8 cm 0.5 cm 8.5 cm No chg 8.5 cm No chg 9 cm +0.5 8.5 cm -0.5 8.5 cm No chg 9 cm +0.5 8.5 cm -0.5 8.5 cm No chg " 8.5 cm No chg 9 cm +0.5 9 cm No chg            Treatment     Paul received individual therapeutic exercises to improve postural correction and alignment, stretching and soft tissue mobility, and strengthening for 5 minutes including the following:     Diaphragmatic breathing, 2 x 5 reps  Scapular retractions  15 reps  Shoulder rolls, 10 reps each fwd and back          Paul received the following manual therapy techniques were performed to increased myofascial/soft tissue length, mobility and pliability, increase PROM, AROM and function as well as to decrease pain for 45 minutes    Short Neck- held due to history of seizures  Clear Healthy Lymph Nodes:  Left Axilla                                                  Right Groin  Open Anastomoses:  Anterior Axillo-Axillary  (AAA)                                    Axillo-Inguinal     (AI)                                    Posterior Axillo-Axillary  (GEREMIAS)     J-stroke at right chest wall  Inferior trunk slide  Repeat J-stroke at right chest wall    Right Upper Arm (Lateral and Medial)  Right  Antecubital Fossa  Right Dorsal Forearm  Right Volar Forearm  Dorsum Right Hand  Right fingers     Rework Right UE  Rework Anastomoses  Rework Healthy lymph Nodes      PT applies gauze to right fingers and hand, stockinette, cotton artiflex, and short stretch bandages to pt's RIGHT/LEFT/BILATERAL: right upper extremity. Pt educated to wear bandaging until next session unless it causes pain, numbness, or changes in skin color/temperature, or if they start to fall down.  If removed, pt instructed to roll up bandages and cotton padding and bring to next session. If bandages are removed, pt can wear his tubigrip.  Pt has Vet glove to cover bandages in the shower, and we reviewed directions on how to care for bandages. Pt verbalizes understanding of all topics.                Home Exercise Program and Patient Education   Education provided re:  - role of PT in multi - disciplinary team,  goals for PT  - progress towards goals         Written Home Exercises Provided: Patient instructed to cont prior HEP.  Exercises were reviewed and Paul was able to demonstrate them prior to the end of the session.  Paul demonstrated good  understanding of the education provided.     See EMR under Media for self lymphatic drainage provided prior visit.    Pt has no cultural, educational or language barriers to learning provided.    Assessment     Patient tolerated session well.  He has been compliant with exercise, elevation, self manual lymph drainage, and wearing compression bandages/tubigrip, yet lymphedema persists. He tolerated complete decongestive therapy well.  Once his measurements stabilize, he will benefit from a compression sleeve and glove, and he will also benefit from a compression pump to manage his symptoms. He has met goals as noted below and will benefit from continued therapy to work towards remaining goals.      Pt prognosis is Good. Pt will continue to benefit from skilled outpatient physical therapy to address the deficits listed in the problem list chart on initial evaluation, provide pt/family education and to maximize pt's level of independence in the home and community environment.     Anticipated barriers to physical therapy: advanced disease    Goals as follows:  Short Term Goals (6 weeks)  1. Pt will demo decrease in girth in right upper extremity by >/= 2cm to allow for improved UE symmetry, clothing choice, ROM, and UE function. (Met, 1/26/23  2. Patient will demonstrate 100% knowledge of lymphedema precautions and signs of infection to allow for reduced risk of lymphedema, reduced risk of infection, and/or exacerbation.  (met)  3. Patient will perform self lymph drainage techniques to enhance lymphatic drainage and mobility.  ( met)  4. Patient will tolerate daily activities with multilayered bandaging to enhance lymphatic drainage and skin elasticity.  (met)  5. Pt will tolerate HEP  for improved strength, functional mobility, ROM, posture, and endurance. (met)        Long Term Goals: ( 12  weeks)  1. Patient to show decreased girth in right upper extremity by >/= 3cm to allow for improved UE symmetry, clothing choice, ROM, and UE function.  (progressing, not met)  2. Patient will show reduction in density to mild or less with improved contour of limb to allow for improved cosmesis, improved lymphatic drainage, and functional mobility.  (progressing, not met)  3. Patient to bruna/doff compression garment with daily compliance to support lymphatic mobility and skin elasticity.  (progressing, not met)  4. Pt to show improved postural awareness and alignment for improved functional mobility.  (progressing, not met)  5. Pt to be independent and compliant with HEP to allow for improved ROM, reach and carry with functional endurance and strength.    (progressing, not met)           Plan   Outpatient physical therapy 2x week for 5weeks to include the following:   Manual Therapy, Neuromuscular Re-ed, Orthotic Management and Training, Patient Education, Self Care, Therapeutic Activities, and Therapeutic Exercise.     Plan of care Certification Period: 1/6/23 to 2/10/23       Therapist: Katelynn Harrell, PT  1/30/2023

## 2023-02-02 ENCOUNTER — CLINICAL SUPPORT (OUTPATIENT)
Dept: REHABILITATION | Facility: HOSPITAL | Age: 48
End: 2023-02-02
Payer: MEDICARE

## 2023-02-02 ENCOUNTER — SPECIALTY PHARMACY (OUTPATIENT)
Dept: PHARMACY | Facility: CLINIC | Age: 48
End: 2023-02-02
Payer: MEDICARE

## 2023-02-02 DIAGNOSIS — Z74.09 IMPAIRED FUNCTIONAL MOBILITY AND ACTIVITY TOLERANCE: ICD-10-CM

## 2023-02-02 DIAGNOSIS — I89.0 LYMPHEDEMA OF RIGHT ARM: Primary | ICD-10-CM

## 2023-02-02 DIAGNOSIS — F41.1 ANXIETY ASSOCIATED WITH CANCER DIAGNOSIS: ICD-10-CM

## 2023-02-02 DIAGNOSIS — C80.1 ANXIETY ASSOCIATED WITH CANCER DIAGNOSIS: ICD-10-CM

## 2023-02-02 PROCEDURE — 97140 MANUAL THERAPY 1/> REGIONS: CPT

## 2023-02-02 PROCEDURE — 97110 THERAPEUTIC EXERCISES: CPT

## 2023-02-02 RX ORDER — ALPRAZOLAM 0.5 MG/1
0.5 TABLET ORAL 3 TIMES DAILY PRN
Qty: 60 TABLET | Refills: 0 | Status: SHIPPED | OUTPATIENT
Start: 2023-02-02 | End: 2023-04-08 | Stop reason: SDUPTHER

## 2023-02-02 NOTE — PROGRESS NOTES
"                                                    Physical Therapy Daily Treatment Note       Date: 2/2/2023   Name: Paul Li Jr.  Clinic Number: [id number]    Therapy Diagnosis:   Encounter Diagnoses   Name Primary?    Lymphedema of right arm Yes    Impaired functional mobility and activity tolerance      Physician: Rosa Linn MD    Physician Orders: PT Eval and Treat -RUE lymphedema  Medical Diagnosis: Malignant neoplasm involving both nipple and areola of right breast in male, estrogen receptor negative [C50.021, Z17.1], Lymphedema of right upper extremity   Evaluation Date: 10/10/2022  Authorization Period Expiration: 10/14/22  Updated Plan of Care Certification Period: 2/10/23 (12 weeks)  Visit # / Visits authorized: 17 (4/12 newly authorized)   Insurance: [employer]  FOTO: 2/3     Time In: 11:15 AM   Time Out: 12:13 PM  Total Billable Time:58 minutes     Precautions: Standard, Fall, cancer, and Spine, Bony Mets, hx of Seizures, L chest wall port      Subjective     Pt reports: He removed bandages prior to PT session. He states upper part becomes loose after 1-2 days  He was compliant with self manual lymph drainage  Response to previous treatment: no adverse reaction  Pain Scale: Paul rates pain on a scale of 0/10 on VAS.   Pain location: N/A    Fatigue: "feel fine"  Functional change: none stated  Treatment:   Chemotherapy: te-adjuvant chemo therapy completed 10/19  Pt is currently on chemotherapy: pt is getting 1 infusion/week for 3 weeks with 1 week break, and he takes oral chemotherapy daily.  Radiation: Completed in February 2020  Surgery date: 12/17/19 pt underwent right axillary lymph node dissection and resection excess lateral tissue; 11/22/19 right simple mastectomy with SLNB; total of 18 lymph nodes removed      Objective     Objective Measures updated at progress report unless specified.     LANDMARK RIGHT UE LEFT UE DIFF RUE Diff RUE Diff RUE Diff RUE Diff RUE Diff " "RUE Diff RUE Diff RUE Diff RUE Diff RUE Diff RUE Diff RUE Diff   Date Eval Eval Eval 10/17/22 10/17/22 10/29/22 10/19/22 11/2/22 11/2/22 11/11/22 11/11/22 11/29/22 11/29/22 12/5/22 12/5/22 12/13/22 12/13/22 12/21/22 12/21/22 12/30/22 12/30/22 1/6/23 1/6/23 1/26/23 1/26/23 2/2/23 2/2/23   E + 8" 50 cm 46.5 cm 3.5 cm 48 cm -2 47.5 cm -0.5 50.5 cm +3 47.5 cm -3 49.5 cm +2 50.5 cm +1 48.5 cm -2 49 cm +0.5 48 cm -1 50 cm +2 48.5 cm -1.5 48 cm -0.5   E + 6" 45.5 cm 44 cm 1.5 cm 45 cm -0.5 46.5 cm +1.5 47 cm +0.5 45 cm -2 45.5 cm +0.5 46 cm +0.5 45 cm -1 45.5 cm +0.5 45.5 cm No chg 47 cm +1.5 45 cm -2 45.5 cm +0.5   E + 4" 40 cm 40.5 cm 0.5 cm 40.5 cm +0.5 40.5cm No chg 41 cm +0.5 40.5 cm -0.5 40.5 cm No chg 40 cm -0.5 41.5 cm +1.5  41 cm -0.5 41 cm No chg 42.5 cm +1.5 40 cm -2.5 42 cm +2   E + 2" 38.5 cm 38.5 cm 0 cm 39 cm +0.5 40 cm +1 39.5 cm -0.5 38.5 cm -1 38 cm -0.5 39 cm +1 39 cm No chg 39 cm No chg 39 cm No chg 39.5 cm +0.5 38.5 cm -1 39 cm +0.5   Elbow 36.5 cm 34 cm 2.5 cm 35.5 cm -1 35.5 cm No chg 36 cm +0.5 34.5 cm -1.5 36 cm +1.5 36 cm No chg 35 cm -1 36 cm +1 35.5 cm -0.5 37.5 cm +2 35 cm -2.5 34.5 cm -0.5   W+ 8" 37.5cm 33 cm 4.5 cm 38cm +0.5 36.5 cm -1.5 37.5 cm +1 36.5 cm -1 37.5 cm +1 37.5 cm No chg 37 cm -0.5 37 cm No chg 37 cm No chg 38 cm +1 37 cm -1 36.5 cm -0.5   W +  6" 35 cm 29.5 cm 5.5 cm 34 cm -1 34.5 cm +0.5 33.5 cm -1 32.5 cm -1 33 cm +0.5 33 cm No chg 33cm No chg 34.5 cm +1.5 34.5 cm No chg 36 cm +1.5 33.5 cm -2.5 33.5 cm No chg   W + 4" 31.5 cm 25 cm 6.5 cm 31.5 cm No chg 31.5 cm No chg 29.5 cm -2 29 cm -0.5 30 cm +1 28 cm -2 29 cm +1 29 cm No chg 29.5 cm +0.5 32 cm +2.5 30 cm -2 29.5 cm -0.5   Wrist 23 cm 20 cm 3 cm 22.5cm -0.5 21.5 cm -1 23 cm +1.5 21 cm -2 24 cm +3 22 cm -2 22 cm No chg 22.5 cm +0.5 23 cm +0.5 23 cm No chg 22 cm -1 21 cm -1   DPC 28.5 cm 26 cm 2.5 cm 28 cm -0.5 27.5cm -0.5 28.5 cm +1 27 cm -1.5 28 cm +1 27 cm -1 26.5 cm -0.5 28 cm +1.5 27.5 cm -0.5 27 cm -0.5 28 cm +1 25.5 " cm -2.5   IP Thumb 8.5 cm 8 cm 0.5 cm 8.5 cm No chg 8.5 cm No chg 9 cm +0.5 8.5 cm -0.5 8.5 cm No chg 9 cm +0.5 8.5 cm -0.5 8.5 cm No chg 8.5 cm No chg 9 cm +0.5 9 cm No chg 8.5 cm -0.5            Treatment     Paul received individual therapeutic exercises to improve postural correction and alignment, stretching and soft tissue mobility, and strengthening for 8 minutes including the following:     Diaphragmatic breathing, 2 x 5 reps  Scapular retractions  20 reps  Shoulder rolls, 10 reps each fwd and back          Paul received the following manual therapy techniques were performed to increased myofascial/soft tissue length, mobility and pliability, increase PROM, AROM and function as well as to decrease pain for 50 minutes    Short Neck- held due to history of seizures  Clear Healthy Lymph Nodes:  Left Axilla                                                  Right Groin  Open Anastomoses:  Anterior Axillo-Axillary  (AAA)                                    Axillo-Inguinal     (AI)                                    Posterior Axillo-Axillary  (GEREMIAS)     Right Upper Arm (Lateral and Medial)  Right  Antecubital Fossa  Right Dorsal Forearm  Right Volar Forearm  Dorsum Right Hand  Right fingers     Rework Right UE  Rework Anastomoses  Rework Healthy lymph Nodes      PT applies gauze to right fingers and hand, stockinette, cotton artiflex, and short stretch bandages to pt's RIGHT/LEFT/BILATERAL: right upper extremity. PT places size H tubigrip over upper arm to assist in keeping bandages up. Pt educated to wear bandaging until next session unless it causes pain, numbness, or changes in skin color/temperature, or if they start to fall down.  If removed, pt instructed to roll up bandages and cotton padding and bring to next session. If bandages are removed, pt can wear his tubigrip.  Pt has Vet glove to cover bandages in the shower, and we reviewed directions on how to care for bandages. Pt verbalizes understanding of all  topics.                Home Exercise Program and Patient Education   Education provided re:  - role of PT in multi - disciplinary team, goals for PT  - progress towards goals         Written Home Exercises Provided: Patient instructed to cont prior HEP.  Exercises were reviewed and Paul was able to demonstrate them prior to the end of the session.  Paul demonstrated good  understanding of the education provided.     See EMR under Media for self lymphatic drainage provided prior visit.    Pt has no cultural, educational or language barriers to learning provided.    Assessment     Patient tolerated session well.  He has been compliant with exercise, elevation, self manual lymph drainage, and wearing compression bandages/tubigrip, yet lymphedema persists. He tolerated complete decongestive therapy well and demo's overall reduction in measurements today. Once his measurements stabilize, he will benefit from a compression sleeve and glove, and he will also benefit from a compression pump to manage his symptoms. He has met goals as noted below and will benefit from continued therapy to work towards remaining goals.      Pt prognosis is Good. Pt will continue to benefit from skilled outpatient physical therapy to address the deficits listed in the problem list chart on initial evaluation, provide pt/family education and to maximize pt's level of independence in the home and community environment.     Anticipated barriers to physical therapy: advanced disease    Goals as follows:  Short Term Goals (6 weeks)  1. Pt will demo decrease in girth in right upper extremity by >/= 2cm to allow for improved UE symmetry, clothing choice, ROM, and UE function. (Met, 1/26/23  2. Patient will demonstrate 100% knowledge of lymphedema precautions and signs of infection to allow for reduced risk of lymphedema, reduced risk of infection, and/or exacerbation.  (met)  3. Patient will perform self lymph drainage techniques to enhance lymphatic  drainage and mobility.  ( met)  4. Patient will tolerate daily activities with multilayered bandaging to enhance lymphatic drainage and skin elasticity.  (met)  5. Pt will tolerate HEP for improved strength, functional mobility, ROM, posture, and endurance. (met)        Long Term Goals: ( 12  weeks)  1. Patient to show decreased girth in right upper extremity by >/= 3cm to allow for improved UE symmetry, clothing choice, ROM, and UE function.  (progressing, not met)  2. Patient will show reduction in density to mild or less with improved contour of limb to allow for improved cosmesis, improved lymphatic drainage, and functional mobility.  (progressing, not met)  3. Patient to bruna/doff compression garment with daily compliance to support lymphatic mobility and skin elasticity.  (progressing, not met)  4. Pt to show improved postural awareness and alignment for improved functional mobility.  (progressing, not met)  5. Pt to be independent and compliant with HEP to allow for improved ROM, reach and carry with functional endurance and strength.    (progressing, not met)           Plan   Outpatient physical therapy 2x week for 5weeks to include the following:   Manual Therapy, Neuromuscular Re-ed, Orthotic Management and Training, Patient Education, Self Care, Therapeutic Activities, and Therapeutic Exercise.     Plan of care Certification Period: 1/6/23 to 2/10/23       Therapist: Katelynn Harrell PT  2/2/2023                                                                                      Physical Therapy Daily Treatment Note       Date: 2/2/2023   Name: Paul Li JrEstefany  Clinic Number: [id number]    Therapy Diagnosis:   Encounter Diagnoses   Name Primary?    Lymphedema of right arm Yes    Impaired functional mobility and activity tolerance      Physician: Rosa Linn MD    Physician Orders: PT Eval and Treat -RUE lymphedema  Medical Diagnosis: Malignant neoplasm involving both nipple and areola of right breast in  "male, estrogen receptor negative [C50.021, Z17.1], Lymphedema of right upper extremity   Evaluation Date: 10/10/2022  Authorization Period Expiration: 10/14/22  Updated Plan of Care Certification Period: 2/10/23 (12 weeks)  Visit # / Visits authorized: 15/ 15 (plus evaluation)  Insurance: [employer]  FOTO: 2/3     Time In: 11:10 AM   Time Out: 12:00 PM  Total Billable Time:50 minutes     Precautions: Standard, Fall, cancer, and Spine, Bony Mets, hx of Seizures, L chest wall port      Subjective     Pt reports: Had a rough reaction from his chemo on Friday.  He left compression bandages on until this morning.  He was compliant with self manual lymph drainage  Response to previous treatment: no adverse reaction  Pain Scale: Paul rates pain on a scale of 0/10 on VAS.   Pain location: N/A    Fatigue: "feel fine"  Functional change: none stated  Treatment:   Chemotherapy: te-adjuvant chemo therapy completed 10/19  Pt is currently on chemotherapy: pt is getting 1 infusion/week for 3 weeks with 1 week break, and he takes oral chemotherapy daily.  Radiation: Completed in February 2020  Surgery date: 12/17/19 pt underwent right axillary lymph node dissection and resection excess lateral tissue; 11/22/19 right simple mastectomy with SLNB; total of 18 lymph nodes removed      Objective     Objective Measures updated at progress report unless specified.     LANDMARK RIGHT UE LEFT UE DIFF RUE Diff RUE Diff RUE Diff RUE Diff RUE Diff RUE Diff RUE Diff RUE Diff RUE Diff RUE Diff RUE Diff   Date Eval Eval Eval 10/17/22 10/17/22 10/29/22 10/19/22 11/2/22 11/2/22 11/11/22 11/11/22 11/29/22 11/29/22 12/5/22 12/5/22 12/13/22 12/13/22 12/21/22 12/21/22 12/30/22 12/30/22 1/6/23 1/6/23 1/26/23 1/26/23   E + 8" 50 cm 46.5 cm 3.5 cm 48 cm -2 47.5 cm -0.5 50.5 cm +3 47.5 cm -3 49.5 cm +2 50.5 cm +1 48.5 cm -2 49 cm +0.5 48 cm -1 50 cm +2 48.5 cm -1.5   E + 6" 45.5 cm 44 cm 1.5 cm 45 cm -0.5 46.5 cm +1.5 47 cm +0.5 45 cm " "-2 45.5 cm +0.5 46 cm +0.5 45 cm -1 45.5 cm +0.5 45.5 cm No chg 47 cm +1.5 45 cm -2   E + 4" 40 cm 40.5 cm 0.5 cm 40.5 cm +0.5 40.5cm No chg 41 cm +0.5 40.5 cm -0.5 40.5 cm No chg 40 cm -0.5 41.5 cm +1.5  41 cm -0.5 41 cm No chg 42.5 cm +1.5 40 cm -2.5   E + 2" 38.5 cm 38.5 cm 0 cm 39 cm +0.5 40 cm +1 39.5 cm -0.5 38.5 cm -1 38 cm -0.5 39 cm +1 39 cm No chg 39 cm No chg 39 cm No chg 39.5 cm +0.5 38.5 cm -1   Elbow 36.5 cm 34 cm 2.5 cm 35.5 cm -1 35.5 cm No chg 36 cm +0.5 34.5 cm -1.5 36 cm +1.5 36 cm No chg 35 cm -1 36 cm +1 35.5 cm -0.5 37.5 cm +2 35 cm -2.5   W+ 8" 37.5cm 33 cm 4.5 cm 38cm +0.5 36.5 cm -1.5 37.5 cm +1 36.5 cm -1 37.5 cm +1 37.5 cm No chg 37 cm -0.5 37 cm No chg 37 cm No chg 38 cm +1 37 cm -1   W +  6" 35 cm 29.5 cm 5.5 cm 34 cm -1 34.5 cm +0.5 33.5 cm -1 32.5 cm -1 33 cm +0.5 33 cm No chg 33cm No chg 34.5 cm +1.5 34.5 cm No chg 36 cm +1.5 33.5 cm -2.5   W + 4" 31.5 cm 25 cm 6.5 cm 31.5 cm No chg 31.5 cm No chg 29.5 cm -2 29 cm -0.5 30 cm +1 28 cm -2 29 cm +1 29 cm No chg 29.5 cm +0.5 32 cm +2.5 30 cm -2   Wrist 23 cm 20 cm 3 cm 22.5cm -0.5 21.5 cm -1 23 cm +1.5 21 cm -2 24 cm +3 22 cm -2 22 cm No chg 22.5 cm +0.5 23 cm +0.5 23 cm No chg 22 cm -1   DPC 28.5 cm 26 cm 2.5 cm 28 cm -0.5 27.5cm -0.5 28.5 cm +1 27 cm -1.5 28 cm +1 27 cm -1 26.5 cm -0.5 28 cm +1.5 27.5 cm -0.5 27 cm -0.5 28 cm +1   IP Thumb 8.5 cm 8 cm 0.5 cm 8.5 cm No chg 8.5 cm No chg 9 cm +0.5 8.5 cm -0.5 8.5 cm No chg 9 cm +0.5 8.5 cm -0.5 8.5 cm No chg 8.5 cm No chg 9 cm +0.5 9 cm No chg            Treatment     Paul received individual therapeutic exercises to improve postural correction and alignment, stretching and soft tissue mobility, and strengthening for 5 minutes including the following:     Diaphragmatic breathing, 2 x 5 reps  Scapular retractions  15 reps  Shoulder rolls, 10 reps each fwd and back          Paul received the following manual therapy techniques were performed to increased myofascial/soft tissue length, " mobility and pliability, increase PROM, AROM and function as well as to decrease pain for 45 minutes    Short Neck- held due to history of seizures  Clear Healthy Lymph Nodes:  Left Axilla                                                  Right Groin  Open Anastomoses:  Anterior Axillo-Axillary  (AAA)                                    Axillo-Inguinal     (AI)                                    Posterior Axillo-Axillary  (GEREMIAS)     J-stroke at right chest wall  Inferior trunk slide  Repeat J-stroke at right chest wall    Right Upper Arm (Lateral and Medial)  Right  Antecubital Fossa  Right Dorsal Forearm  Right Volar Forearm  Dorsum Right Hand  Right fingers     Rework Right UE  Rework Anastomoses  Rework Healthy lymph Nodes      PT applies gauze to right fingers and hand, stockinette, cotton artiflex, and short stretch bandages to pt's RIGHT/LEFT/BILATERAL: right upper extremity. Pt educated to wear bandaging until next session unless it causes pain, numbness, or changes in skin color/temperature, or if they start to fall down.  If removed, pt instructed to roll up bandages and cotton padding and bring to next session. If bandages are removed, pt can wear his tubigrip.  Pt has Vet glove to cover bandages in the shower, and we reviewed directions on how to care for bandages. Pt verbalizes understanding of all topics.                Home Exercise Program and Patient Education   Education provided re:  - role of PT in multi - disciplinary team, goals for PT  - progress towards goals         Written Home Exercises Provided: Patient instructed to cont prior HEP.  Exercises were reviewed and Paul was able to demonstrate them prior to the end of the session.  Paul demonstrated good  understanding of the education provided.     See EMR under Media for self lymphatic drainage provided prior visit.    Pt has no cultural, educational or language barriers to learning provided.    Assessment     Patient tolerated session well.   He has been compliant with exercise, elevation, self manual lymph drainage, and wearing compression bandages/tubigrip, yet lymphedema persists. He tolerated complete decongestive therapy well.  Once his measurements stabilize, he will benefit from a compression sleeve and glove, and he will also benefit from a compression pump to manage his symptoms. He has met goals as noted below and will benefit from continued therapy to work towards remaining goals.      Pt prognosis is Good. Pt will continue to benefit from skilled outpatient physical therapy to address the deficits listed in the problem list chart on initial evaluation, provide pt/family education and to maximize pt's level of independence in the home and community environment.     Anticipated barriers to physical therapy: advanced disease    Goals as follows:  Short Term Goals (6 weeks)  1. Pt will demo decrease in girth in right upper extremity by >/= 2cm to allow for improved UE symmetry, clothing choice, ROM, and UE function. (Met, 1/26/23  2. Patient will demonstrate 100% knowledge of lymphedema precautions and signs of infection to allow for reduced risk of lymphedema, reduced risk of infection, and/or exacerbation.  (met)  3. Patient will perform self lymph drainage techniques to enhance lymphatic drainage and mobility.  ( met)  4. Patient will tolerate daily activities with multilayered bandaging to enhance lymphatic drainage and skin elasticity.  (met)  5. Pt will tolerate HEP for improved strength, functional mobility, ROM, posture, and endurance. (met)        Long Term Goals: ( 12  weeks)  1. Patient to show decreased girth in right upper extremity by >/= 3cm to allow for improved UE symmetry, clothing choice, ROM, and UE function.  (progressing, not met)  2. Patient will show reduction in density to mild or less with improved contour of limb to allow for improved cosmesis, improved lymphatic drainage, and functional mobility.  (progressing, not  met)  3. Patient to bruna/doff compression garment with daily compliance to support lymphatic mobility and skin elasticity.  (progressing, not met)  4. Pt to show improved postural awareness and alignment for improved functional mobility.  (progressing, not met)  5. Pt to be independent and compliant with HEP to allow for improved ROM, reach and carry with functional endurance and strength.    (progressing, not met)           Plan   Outpatient physical therapy 2x week for 5weeks to include the following:   Manual Therapy, Neuromuscular Re-ed, Orthotic Management and Training, Patient Education, Self Care, Therapeutic Activities, and Therapeutic Exercise.     Plan of care Certification Period: 1/6/23 to 2/10/23       Therapist: Katelynn Harrell, PT  2/2/2023

## 2023-02-02 NOTE — TELEPHONE ENCOUNTER
Outgoing call to pt about refill of Piqray. Pt stated he has 18 days on hand. Pending refill call by one week to set up refill. Pt voiced understanding.

## 2023-02-03 ENCOUNTER — PATIENT MESSAGE (OUTPATIENT)
Dept: HEMATOLOGY/ONCOLOGY | Facility: CLINIC | Age: 48
End: 2023-02-03
Payer: MEDICARE

## 2023-02-03 ENCOUNTER — INFUSION (OUTPATIENT)
Dept: INFUSION THERAPY | Facility: HOSPITAL | Age: 48
End: 2023-02-03
Payer: MEDICARE

## 2023-02-03 VITALS
DIASTOLIC BLOOD PRESSURE: 56 MMHG | WEIGHT: 315 LBS | BODY MASS INDEX: 41.75 KG/M2 | RESPIRATION RATE: 18 BRPM | HEIGHT: 73 IN | HEART RATE: 69 BPM | TEMPERATURE: 98 F | SYSTOLIC BLOOD PRESSURE: 122 MMHG

## 2023-02-03 DIAGNOSIS — Z17.1 MALIGNANT NEOPLASM INVOLVING BOTH NIPPLE AND AREOLA OF RIGHT BREAST IN MALE, ESTROGEN RECEPTOR NEGATIVE: Primary | ICD-10-CM

## 2023-02-03 DIAGNOSIS — C50.021 MALIGNANT NEOPLASM INVOLVING BOTH NIPPLE AND AREOLA OF RIGHT BREAST IN MALE, ESTROGEN RECEPTOR NEGATIVE: Primary | ICD-10-CM

## 2023-02-03 DIAGNOSIS — G89.3 NEOPLASM RELATED PAIN: ICD-10-CM

## 2023-02-03 PROCEDURE — 96413 CHEMO IV INFUSION 1 HR: CPT

## 2023-02-03 PROCEDURE — 25000003 PHARM REV CODE 250: Performed by: HOSPITALIST

## 2023-02-03 PROCEDURE — 63600175 PHARM REV CODE 636 W HCPCS: Mod: JW,JG | Performed by: HOSPITALIST

## 2023-02-03 RX ORDER — SODIUM CHLORIDE 0.9 % (FLUSH) 0.9 %
10 SYRINGE (ML) INJECTION
Status: CANCELLED | OUTPATIENT
Start: 2023-02-03

## 2023-02-03 RX ORDER — SODIUM CHLORIDE 9 MG/ML
INJECTION, SOLUTION INTRAVENOUS CONTINUOUS
Status: CANCELLED | OUTPATIENT
Start: 2023-02-03

## 2023-02-03 RX ORDER — HEPARIN 100 UNIT/ML
500 SYRINGE INTRAVENOUS
Status: CANCELLED | OUTPATIENT
Start: 2023-02-03

## 2023-02-03 RX ORDER — HEPARIN 100 UNIT/ML
500 SYRINGE INTRAVENOUS
Status: DISCONTINUED | OUTPATIENT
Start: 2023-02-03 | End: 2023-02-03 | Stop reason: HOSPADM

## 2023-02-03 RX ORDER — HYDROCODONE BITARTRATE AND ACETAMINOPHEN 10; 325 MG/1; MG/1
1 TABLET ORAL EVERY 6 HOURS PRN
Qty: 90 TABLET | Refills: 0 | Status: CANCELLED | OUTPATIENT
Start: 2023-02-03

## 2023-02-03 RX ORDER — SODIUM CHLORIDE 0.9 % (FLUSH) 0.9 %
10 SYRINGE (ML) INJECTION
Status: DISCONTINUED | OUTPATIENT
Start: 2023-02-03 | End: 2023-02-03 | Stop reason: HOSPADM

## 2023-02-03 RX ORDER — SODIUM CHLORIDE 9 MG/ML
INJECTION, SOLUTION INTRAVENOUS CONTINUOUS
Status: DISCONTINUED | OUTPATIENT
Start: 2023-02-03 | End: 2023-02-03 | Stop reason: HOSPADM

## 2023-02-03 RX ADMIN — SODIUM CHLORIDE: 9 INJECTION, SOLUTION INTRAVENOUS at 10:02

## 2023-02-03 RX ADMIN — PACLITAXEL 220 MG: 100 INJECTION, POWDER, LYOPHILIZED, FOR SUSPENSION INTRAVENOUS at 10:02

## 2023-02-03 NOTE — PLAN OF CARE
Pt arrived to unit from Lab.  Messaged MD to sign orders.   Reviewed tx plan and pt without any questions.  Pt tolerated tx without any difficulty.  De accessed port and pt left unit in ambulatory condition without and questions comments or concerns.

## 2023-02-06 ENCOUNTER — PATIENT MESSAGE (OUTPATIENT)
Dept: HEMATOLOGY/ONCOLOGY | Facility: CLINIC | Age: 48
End: 2023-02-06
Payer: MEDICARE

## 2023-02-06 ENCOUNTER — CLINICAL SUPPORT (OUTPATIENT)
Dept: REHABILITATION | Facility: HOSPITAL | Age: 48
End: 2023-02-06
Payer: MEDICARE

## 2023-02-06 DIAGNOSIS — Z74.09 IMPAIRED FUNCTIONAL MOBILITY AND ACTIVITY TOLERANCE: ICD-10-CM

## 2023-02-06 DIAGNOSIS — G89.3 NEOPLASM RELATED PAIN: ICD-10-CM

## 2023-02-06 DIAGNOSIS — I89.0 LYMPHEDEMA OF RIGHT ARM: Primary | ICD-10-CM

## 2023-02-06 PROCEDURE — 97140 MANUAL THERAPY 1/> REGIONS: CPT

## 2023-02-06 PROCEDURE — 97110 THERAPEUTIC EXERCISES: CPT

## 2023-02-06 NOTE — PROGRESS NOTES
"                                                    Physical Therapy Daily Treatment Note       Date: 2/6/2023   Name: Paul Li Jr.  Clinic Number: [id number]    Therapy Diagnosis:   Encounter Diagnoses   Name Primary?    Lymphedema of right arm Yes    Impaired functional mobility and activity tolerance      Physician: Rosa Linn MD    Physician Orders: PT Eval and Treat -RUE lymphedema  Medical Diagnosis: Malignant neoplasm involving both nipple and areola of right breast in male, estrogen receptor negative [C50.021, Z17.1], Lymphedema of right upper extremity   Evaluation Date: 10/10/2022  Authorization Period Expiration: 10/14/22  Updated Plan of Care Certification Period: 2/10/23 (12 weeks)  Visit # / Visits authorized: 18 (5/12 newly authorized)   Insurance: [employer]  FOTO: 2/3     Time In: 11:10 AM   Time Out: 12:10 PM  Total Billable Time: 60 minutes     Precautions: Standard, Fall, cancer, and Spine, Bony Mets, hx of Seizures, L chest wall port      Subjective     Pt reports: He removed bandages prior to PT session. He states upper part stayed a little better with tubigrip addition.  He was compliant with self manual lymph drainage  Response to previous treatment: no adverse reaction  Pain Scale: Paul rates pain on a scale of 0/10 on VAS.   Pain location: N/A    Fatigue: "feel fine"  Functional change: none stated  Treatment:   Chemotherapy: te-adjuvant chemo therapy completed 10/19  Pt is currently on chemotherapy: pt is getting 1 infusion/week for 3 weeks with 1 week break, and he takes oral chemotherapy daily.  Radiation: Completed in February 2020  Surgery date: 12/17/19 pt underwent right axillary lymph node dissection and resection excess lateral tissue; 11/22/19 right simple mastectomy with SLNB; total of 18 lymph nodes removed      Objective     Objective Measures updated at progress report unless specified.     LANDMARK RIGHT UE LEFT UE DIFF RUE Diff RUE Diff RUE Diff " "RUE Diff RUE Diff RUE Diff RUE Diff RUE Diff RUE Diff RUE Diff RUE Diff RUE Diff   Date Eval Eval Eval 10/17/22 10/17/22 10/29/22 10/19/22 11/2/22 11/2/22 11/11/22 11/11/22 11/29/22 11/29/22 12/5/22 12/5/22 12/13/22 12/13/22 12/21/22 12/21/22 12/30/22 12/30/22 1/6/23 1/6/23 1/26/23 1/26/23 2/6/23 2/6/23   E + 8" 50 cm 46.5 cm 3.5 cm 48 cm -2 47.5 cm -0.5 50.5 cm +3 47.5 cm -3 49.5 cm +2 50.5 cm +1 48.5 cm -2 49 cm +0.5 48 cm -1 50 cm +2 48.5 cm -1.5 49 cm +0.5   E + 6" 45.5 cm 44 cm 1.5 cm 45 cm -0.5 46.5 cm +1.5 47 cm +0.5 45 cm -2 45.5 cm +0.5 46 cm +0.5 45 cm -1 45.5 cm +0.5 45.5 cm No chg 47 cm +1.5 45 cm -2 45.5 cm +0.5   E + 4" 40 cm 40.5 cm 0.5 cm 40.5 cm +0.5 40.5cm No chg 41 cm +0.5 40.5 cm -0.5 40.5 cm No chg 40 cm -0.5 41.5 cm +1.5  41 cm -0.5 41 cm No chg 42.5 cm +1.5 40 cm -2.5 41.5 cm +1.5   E + 2" 38.5 cm 38.5 cm 0 cm 39 cm +0.5 40 cm +1 39.5 cm -0.5 38.5 cm -1 38 cm -0.5 39 cm +1 39 cm No chg 39 cm No chg 39 cm No chg 39.5 cm +0.5 38.5 cm -1 37.5 cm -1   Elbow 36.5 cm 34 cm 2.5 cm 35.5 cm -1 35.5 cm No chg 36 cm +0.5 34.5 cm -1.5 36 cm +1.5 36 cm No chg 35 cm -1 36 cm +1 35.5 cm -0.5 37.5 cm +2 35 cm -2.5 35 cm No chg   W+ 8" 37.5cm 33 cm 4.5 cm 38cm +0.5 36.5 cm -1.5 37.5 cm +1 36.5 cm -1 37.5 cm +1 37.5 cm No chg 37 cm -0.5 37 cm No chg 37 cm No chg 38 cm +1 37 cm -1 37.5 cm +0.5   W +  6" 35 cm 29.5 cm 5.5 cm 34 cm -1 34.5 cm +0.5 33.5 cm -1 32.5 cm -1 33 cm +0.5 33 cm No chg 33cm No chg 34.5 cm +1.5 34.5 cm No chg 36 cm +1.5 33.5 cm -2.5 34.5 cm +1   W + 4" 31.5 cm 25 cm 6.5 cm 31.5 cm No chg 31.5 cm No chg 29.5 cm -2 29 cm -0.5 30 cm +1 28 cm -2 29 cm +1 29 cm No chg 29.5 cm +0.5 32 cm +2.5 30 cm -2 30 cm No chg   Wrist 23 cm 20 cm 3 cm 22.5cm -0.5 21.5 cm -1 23 cm +1.5 21 cm -2 24 cm +3 22 cm -2 22 cm No chg 22.5 cm +0.5 23 cm +0.5 23 cm No chg 22 cm -1 21 cm -1   DPC 28.5 cm 26 cm 2.5 cm 28 cm -0.5 27.5cm -0.5 28.5 cm +1 27 cm -1.5 28 cm +1 27 cm -1 26.5 cm -0.5 28 cm +1.5 27.5 cm -0.5 27 cm " -0.5 28 cm +1 25.5 cm -2.5   IP Thumb 8.5 cm 8 cm 0.5 cm 8.5 cm No chg 8.5 cm No chg 9 cm +0.5 8.5 cm -0.5 8.5 cm No chg 9 cm +0.5 8.5 cm -0.5 8.5 cm No chg 8.5 cm No chg 9 cm +0.5 9 cm No chg 8.5 cm -0.5            Treatment     Paul received individual therapeutic exercises to improve postural correction and alignment, stretching and soft tissue mobility, and strengthening for 15 minutes including the following:     Diaphragmatic breathing, 2 x 5 reps  Scapular retractions  20 reps  Shoulder rolls, 10 reps each fwd and back  Chicken wings x 10  Head turns x 5 reps B  Head tilts x 5 reps B  Bicep curls x 10 reps          Paul received the following manual therapy techniques were performed to increased myofascial/soft tissue length, mobility and pliability, increase PROM, AROM and function as well as to decrease pain for 45 minutes    Short Neck- held due to history of seizures  Clear Healthy Lymph Nodes:  Left Axilla                                                  Right Groin  Open Anastomoses:  Anterior Axillo-Axillary  (AAA)                                    Axillo-Inguinal     (AI)                                    Posterior Axillo-Axillary  (GEREMIAS)     J-stroke at lateral chest wall  Inferior trunk slide  Repeat J-stroke    Right Upper Arm (Lateral and Medial)  Right  Antecubital Fossa  Right Dorsal Forearm  Right Volar Forearm  Dorsum Right Hand  Right fingers     Rework Right UE  Rework Anastomoses  Rework Healthy lymph Nodes      PT applies gauze to right fingers and hand, stockinette, cotton artiflex, and short stretch bandages to pt's RIGHT/LEFT/BILATERAL: right upper extremity. PT places size H tubigrip over upper arm to assist in keeping bandages up. Pt educated to wear bandaging until next session unless it causes pain, numbness, or changes in skin color/temperature, or if they start to fall down.  If removed, pt instructed to roll up bandages and cotton padding and bring to next session. If bandages  are removed, pt can wear his tubigrip.  Pt has Vet glove to cover bandages in the shower, and we reviewed directions on how to care for bandages. Pt verbalizes understanding of all topics.                Home Exercise Program and Patient Education   Education provided re:  - role of PT in multi - disciplinary team, goals for PT  - progress towards goals         Written Home Exercises Provided: Patient instructed to cont prior HEP.  Exercises were reviewed and Paul was able to demonstrate them prior to the end of the session.  Paul demonstrated good  understanding of the education provided.     See EMR under Media for self lymphatic drainage provided prior visit.    Pt has no cultural, educational or language barriers to learning provided.    Assessment     Patient tolerated session well.  He has been compliant with exercise, elevation, self manual lymph drainage, and wearing compression bandages/tubigrip, yet lymphedema persists. He tolerated complete decongestive therapy well and demo's mix of increases and reductions in measurements today.  This may be due to compression bandages sliding down and collecting at elbow. Once his measurements stabilize, he will benefit from a compression sleeve and glove, and he will also benefit from a compression pump to manage his symptoms. He has met goals as noted below and will benefit from continued therapy to work towards remaining goals.      Pt prognosis is Good. Pt will continue to benefit from skilled outpatient physical therapy to address the deficits listed in the problem list chart on initial evaluation, provide pt/family education and to maximize pt's level of independence in the home and community environment.     Anticipated barriers to physical therapy: advanced disease    Goals as follows:  Short Term Goals (6 weeks)  1. Pt will demo decrease in girth in right upper extremity by >/= 2cm to allow for improved UE symmetry, clothing choice, ROM, and UE function. (Met,  1/26/23  2. Patient will demonstrate 100% knowledge of lymphedema precautions and signs of infection to allow for reduced risk of lymphedema, reduced risk of infection, and/or exacerbation.  (met)  3. Patient will perform self lymph drainage techniques to enhance lymphatic drainage and mobility.  ( met)  4. Patient will tolerate daily activities with multilayered bandaging to enhance lymphatic drainage and skin elasticity.  (met)  5. Pt will tolerate HEP for improved strength, functional mobility, ROM, posture, and endurance. (met)        Long Term Goals: ( 12  weeks)  1. Patient to show decreased girth in right upper extremity by >/= 3cm to allow for improved UE symmetry, clothing choice, ROM, and UE function.  (progressing, not met)  2. Patient will show reduction in density to mild or less with improved contour of limb to allow for improved cosmesis, improved lymphatic drainage, and functional mobility.  (progressing, not met)  3. Patient to bruna/doff compression garment with daily compliance to support lymphatic mobility and skin elasticity.  (progressing, not met)  4. Pt to show improved postural awareness and alignment for improved functional mobility.  (progressing, not met)  5. Pt to be independent and compliant with HEP to allow for improved ROM, reach and carry with functional endurance and strength.    (progressing, not met)           Plan   Outpatient physical therapy 2x week for 5weeks to include the following:   Manual Therapy, Neuromuscular Re-ed, Orthotic Management and Training, Patient Education, Self Care, Therapeutic Activities, and Therapeutic Exercise.     Plan of care Certification Period: 1/6/23 to 2/10/23       Therapist: Katelynn Harrell, PT  2/6/2023

## 2023-02-07 RX ORDER — HYDROCODONE BITARTRATE AND ACETAMINOPHEN 10; 325 MG/1; MG/1
1 TABLET ORAL EVERY 6 HOURS PRN
Qty: 90 TABLET | Refills: 0 | OUTPATIENT
Start: 2023-02-07

## 2023-02-07 RX ORDER — HYDROCODONE BITARTRATE AND ACETAMINOPHEN 10; 325 MG/1; MG/1
1 TABLET ORAL EVERY 6 HOURS PRN
Qty: 90 TABLET | Refills: 0 | Status: SHIPPED | OUTPATIENT
Start: 2023-02-07 | End: 2023-02-24 | Stop reason: SDUPTHER

## 2023-02-08 ENCOUNTER — DOCUMENTATION ONLY (OUTPATIENT)
Dept: ONCOLOGY | Facility: HOSPITAL | Age: 48
End: 2023-02-08
Payer: MEDICARE

## 2023-02-08 ENCOUNTER — SPECIALTY PHARMACY (OUTPATIENT)
Dept: PHARMACY | Facility: CLINIC | Age: 48
End: 2023-02-08
Payer: MEDICARE

## 2023-02-08 NOTE — PROGRESS NOTES
Received call from patient requesting assistance with transportation for appointments tomorrow and Friday, He would like a  of 8:30 for his appointment tomorrow and 7:30am for his Friday appointment. Called [employer] and secured the ride.

## 2023-02-08 NOTE — TELEPHONE ENCOUNTER
Specialty Pharmacy - Refill Coordination    Specialty Medication Orders Linked to Encounter      Flowsheet Row Most Recent Value   Medication #1 alpelisib (PIQRAY) 300 mg/day (150 mg x 2) Tab (Order#[id number], Rx#[id number])            Refill Questions - Documented Responses      Flowsheet Row Most Recent Value   Patient Availability and HIPAA Verification    Does patient want to proceed with activity? Yes   HIPAA/medical authority confirmed? Yes   Relationship to patient of person spoken to? Self   Refill Screening Questions    Changes to allergies? No   Changes to medications? No   New conditions since last clinic visit? No   Unplanned office visit, urgent care, ED, or hospital admission in the last 4 weeks? No   How does patient/caregiver feel medication is working? Good   Financial problems or insurance changes? No   How many doses of your specialty medications were missed in the last 4 weeks? 0   Would patient like to speak to a pharmacist? No   When does the patient need to receive the medication? 02/15/23   Refill Delivery Questions    How will the patient receive the medication? [facility]   When does the patient need to receive the medication? 02/15/23   Shipping Address Prescription   Address in [facility] confirmed and updated if neccessary? Yes   Expected Copay ($) 10.35   Is the patient able to afford the medication copay? Yes   Payment Method CC on file   Days supply of Refill 30   Supplies needed? No supplies needed   Refill activity completed? Yes   Refill activity plan Refill scheduled   Shipment/Pickup Date: 02/09/23            Current Outpatient Medications   Medication Sig    alpelisib (PIQRAY) 300 mg/day (150 mg x 2) Tab Take 2 tablets (300 mg) by mouth once daily.    ALPRAZolam (XANAX) 0.5 MG tablet Take 1 tablet (0.5 mg total) by mouth 3 (three) times daily as needed for Insomnia or Anxiety.    amLODIPine (NORVASC) 10 MG tablet TAKE 1 TABLET (10 MG) BY MOUTH EVERY DAY    calcium-vitamin D3  "(OYSTER SHELL CALCIUM-VIT D3) 500 mg-5 mcg (200 unit) per tablet Take 1 tablet by mouth 2 (two) times daily.    diazePAM (VALIUM) 5 MG tablet Take 1 tablet (5 mg total) by mouth every 12 (twelve) hours as needed (muscle spasm).    diphenoxylate-atropine 2.5-0.025 mg (LOMOTIL) 2.5-0.025 mg per tablet Take 1 tablet by mouth 4 (four) times daily as needed for Diarrhea.    dulaglutide (TRULICITY) 1.5 mg/0.5 mL pen injector Inject 1.5 mg into the skin once a week.    FLUoxetine 20 MG capsule Take 2 capsules (40 mg total) by mouth once daily.    gabapentin (NEURONTIN) 300 MG capsule Take 1 capsule (300 mg total) by mouth 3 (three) times daily.    hydroCHLOROthiazide (HYDRODIURIL) 25 MG tablet TAKE 1 TABLET BY MOUTH ONCE DAILY    HYDROcodone-acetaminophen (NORCO)  mg per tablet Take 1 tablet by mouth every 6 (six) hours as needed for Pain.    ibuprofen (ADVIL,MOTRIN) 600 MG tablet Take 1 tablet (600 mg total) by mouth every 8 (eight) hours as needed for Pain.    insulin detemir U-100 (LEVEMIR FLEXTOUCH U-100 INSULN) 100 unit/mL (3 mL) InPn pen Inject 21 Units into the skin every evening.    insulin lispro (HUMALOG KWIKPEN INSULIN) 100 unit/mL pen Inject 5 Units into the skin 3 (three) times daily.    lancets 33 gauge Misc 1 lancet by Misc.(Non-Drug; Combo Route) route once daily.    lancing device Misc 1 Device by Misc.(Non-Drug; Combo Route) route 2 (two) times daily with meals.    LIDOcaine (LIDODERM) 5 % Place 1 patch onto the skin once daily. Remove & Discard patch within 12 hours or as directed by MD    LIDOcaine-prilocaine (EMLA) cream Apply topically as needed.    losartan (COZAAR) 50 MG tablet Take 2 tablets by mouth once daily    metFORMIN (GLUCOPHAGE) 500 MG tablet Take 2 tablets (1,000 mg total) by mouth 2 (two) times daily with meals. Needs appointment for future refills    pen needle, diabetic (BD ULTRA-FINE TANESHA PEN NEEDLE) 32 gauge x 5/32" Ndle Use as directed with novolog and levemir    polyethylene " glycol (GLYCOLAX) 17 gram/dose powder Use cap to measure 17g, mix with liquid, and take by mouth once daily.    tadalafiL (CIALIS) 5 MG tablet Take 2 tablets (10 mg total) by mouth daily as needed for Erectile Dysfunction.    traZODone (DESYREL) 100 MG tablet Take 1 tablet (100 mg total) by mouth every evening.   Last reviewed on 2/3/2023  9:19 AM by Elida Helm RN    Review of patient's allergies indicates:  No Known Allergies Last reviewed on  2/7/2023 9:34 AM by Michelle Grayson      Tasks added this encounter   3/10/2023 - Refill Call (Auto Added)   Tasks due within next 3 months   4/3/2023 - Clinical - Follow Up Assesement (180 day)     [facility]  [address]  [facility] [address]  Phone: [phone]  Fax: [phone]

## 2023-02-09 ENCOUNTER — CLINICAL SUPPORT (OUTPATIENT)
Dept: REHABILITATION | Facility: HOSPITAL | Age: 48
End: 2023-02-09
Payer: MEDICARE

## 2023-02-09 ENCOUNTER — OFFICE VISIT (OUTPATIENT)
Dept: PALLIATIVE MEDICINE | Facility: CLINIC | Age: 48
End: 2023-02-09
Payer: MEDICARE

## 2023-02-09 VITALS
OXYGEN SATURATION: 99 % | TEMPERATURE: 97 F | BODY MASS INDEX: 41.75 KG/M2 | SYSTOLIC BLOOD PRESSURE: 128 MMHG | HEIGHT: 73 IN | WEIGHT: 315 LBS | HEART RATE: 65 BPM | DIASTOLIC BLOOD PRESSURE: 62 MMHG

## 2023-02-09 DIAGNOSIS — Z17.1 MALIGNANT NEOPLASM INVOLVING BOTH NIPPLE AND AREOLA OF RIGHT BREAST IN MALE, ESTROGEN RECEPTOR NEGATIVE: Primary | ICD-10-CM

## 2023-02-09 DIAGNOSIS — C50.021 MALIGNANT NEOPLASM INVOLVING BOTH NIPPLE AND AREOLA OF RIGHT BREAST IN MALE, ESTROGEN RECEPTOR NEGATIVE: Primary | ICD-10-CM

## 2023-02-09 DIAGNOSIS — I89.0 LYMPHEDEMA OF RIGHT ARM: Primary | ICD-10-CM

## 2023-02-09 DIAGNOSIS — Z74.09 IMPAIRED FUNCTIONAL MOBILITY AND ACTIVITY TOLERANCE: ICD-10-CM

## 2023-02-09 DIAGNOSIS — Z51.5 ENCOUNTER FOR PALLIATIVE CARE: ICD-10-CM

## 2023-02-09 DIAGNOSIS — F43.23 ADJUSTMENT DISORDER WITH MIXED ANXIETY AND DEPRESSED MOOD: ICD-10-CM

## 2023-02-09 DIAGNOSIS — G89.3 CANCER RELATED PAIN: ICD-10-CM

## 2023-02-09 PROCEDURE — 97110 THERAPEUTIC EXERCISES: CPT

## 2023-02-09 PROCEDURE — 3008F PR BODY MASS INDEX (BMI) DOCUMENTED: ICD-10-PCS | Mod: CPTII,S$GLB,, | Performed by: EMERGENCY MEDICINE

## 2023-02-09 PROCEDURE — 3008F BODY MASS INDEX DOCD: CPT | Mod: CPTII,S$GLB,, | Performed by: EMERGENCY MEDICINE

## 2023-02-09 PROCEDURE — 1159F PR MEDICATION LIST DOCUMENTED IN MEDICAL RECORD: ICD-10-PCS | Mod: CPTII,S$GLB,, | Performed by: EMERGENCY MEDICINE

## 2023-02-09 PROCEDURE — 1160F RVW MEDS BY RX/DR IN RCRD: CPT | Mod: CPTII,S$GLB,, | Performed by: EMERGENCY MEDICINE

## 2023-02-09 PROCEDURE — 99215 PR OFFICE/OUTPT VISIT, EST, LEVL V, 40-54 MIN: ICD-10-PCS | Mod: S$GLB,,, | Performed by: EMERGENCY MEDICINE

## 2023-02-09 PROCEDURE — 97140 MANUAL THERAPY 1/> REGIONS: CPT

## 2023-02-09 PROCEDURE — 3074F PR MOST RECENT SYSTOLIC BLOOD PRESSURE < 130 MM HG: ICD-10-PCS | Mod: CPTII,S$GLB,, | Performed by: EMERGENCY MEDICINE

## 2023-02-09 PROCEDURE — 3078F PR MOST RECENT DIASTOLIC BLOOD PRESSURE < 80 MM HG: ICD-10-PCS | Mod: CPTII,S$GLB,, | Performed by: EMERGENCY MEDICINE

## 2023-02-09 PROCEDURE — 4010F ACE/ARB THERAPY RXD/TAKEN: CPT | Mod: CPTII,S$GLB,, | Performed by: EMERGENCY MEDICINE

## 2023-02-09 PROCEDURE — 99999 PR PBB SHADOW E&M-EST. PATIENT-LVL IV: ICD-10-PCS | Mod: PBBFAC,,, | Performed by: EMERGENCY MEDICINE

## 2023-02-09 PROCEDURE — 3074F SYST BP LT 130 MM HG: CPT | Mod: CPTII,S$GLB,, | Performed by: EMERGENCY MEDICINE

## 2023-02-09 PROCEDURE — 99999 PR PBB SHADOW E&M-EST. PATIENT-LVL IV: CPT | Mod: PBBFAC,,, | Performed by: EMERGENCY MEDICINE

## 2023-02-09 PROCEDURE — 1159F MED LIST DOCD IN RCRD: CPT | Mod: CPTII,S$GLB,, | Performed by: EMERGENCY MEDICINE

## 2023-02-09 PROCEDURE — 1160F PR REVIEW ALL MEDS BY PRESCRIBER/CLIN PHARMACIST DOCUMENTED: ICD-10-PCS | Mod: CPTII,S$GLB,, | Performed by: EMERGENCY MEDICINE

## 2023-02-09 PROCEDURE — 4010F PR ACE/ARB THEARPY RXD/TAKEN: ICD-10-PCS | Mod: CPTII,S$GLB,, | Performed by: EMERGENCY MEDICINE

## 2023-02-09 PROCEDURE — 3078F DIAST BP <80 MM HG: CPT | Mod: CPTII,S$GLB,, | Performed by: EMERGENCY MEDICINE

## 2023-02-09 PROCEDURE — 99215 OFFICE O/P EST HI 40 MIN: CPT | Mod: S$GLB,,, | Performed by: EMERGENCY MEDICINE

## 2023-02-09 NOTE — PROGRESS NOTES
[facility]  [facility]    Progress Note        Reason for Consult: symptom management and ACP   Referring MD: Dr. 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 Linn  - currently on disease directed maintenance therapy; tolerating well.    Encounter for palliative care/Advanced care planning  - patient decisional  - patient by himself during visit today  - no ACP documents uploaded into EMR  - goals: life prolonging  - philosophy of Palliative Medicine reviewed with patient at first visit  - new patient folder given to and briefly reviewed with patient at first visit  - ACP booklet given to and reviewed with patient at first visit by Sarabjit Casas RN  - code status not specifically discussed at this visit  - will follow up at future appointments for any questions/concerns that arise after patient reviews new patient information   - per chart review, patient's oncology team did start talk about overall poor prognosis of his cancer; however, patient has has been doing very well at this time; ECOG 1    Adjustment disorder with mixed anxiety and depressed mood  - doing very well; using positive mind management techniques and has learned approaching certain issues with a different viewpoint  -states therapy has been incredibly helpful and has positively affected all of his relationships  -discussed nature therapy for both him and his family with specific plans    Poor concentration/neoplasm related fatigue  - imrpoved; doing occasional side jobs to stay busy and help to supplement disability    Insomnia  - cont trazodone to 100 mg qhs  - referred patient back to sleep medicine today  - will continue close monitoring    Cancer related pain/Neuropathic pain  - patient denies any  pain at this time. He states he wants to continue to be more active.   - he states his main issue is neuropathy in his bilateral lower extremities with L > R  - patient also using compression sleeve for right arm lymphedema; he is working with PT for lymphedema  - patient reports that he uses his Norco only when he is working. He will sometimes need up to two to three doses if he is very active in the job  - no need for refill of norco at this time  - he uses gabapentin 300 mg TID with moderate relief  - previously recommended increasing gabapentin to 600 mg qhs and continue 300 mg at other times  - opioid safety sheet in new patient folder for patient to review  - will continue to monitor    Understanding of illness/Prognosis: patient has good understanding of his illness; prognosis is guarded    Goals of care: life prolonging    Follow up: 3 months    Patient's encounter and above plan of care discussed with patient's oncology-psychologist    SUBJECTIVE:     History of Present Illness:  Patient is a 47 y.o. year old male with arthritis, DM, HTN, and right sided breast cancer presents to Palliative Medicine for symptom management and ACP. Please see oncology notes for full details of oncologic history and treatment course.     2/9/2022  THEE GARCIA reviewed and accurate.  Pt doing well and seen after working with PT and lymphedema therapist.  Awaiting arrival of sleeve which will help provide some relief from cloth bandages.  States symptoms have been very well controlled and he has adjusted to his new way of life in living with his cancer.  Finding iliana in having time to himself on therapy days at the infusion center, spending quality time with family,a nd feels that therapy has helped his mental approach to daily living.      12/15/2022:  THEE GARCIA reviewed and summarized:  12/08/2022 Lomotil Disp: 60 for 15 days  12/08/2022 Gabapentin 300 mg Disp: 90 for 30 days  12/07/2022 Hydrocodone-apap  mg Disp: 90 for 23  "days    Patient has missed a couple of appointments since last visit. He has completed IOP therapy. Patient is tolerating treatment well. He is still following with Dr. Sandoval. Today patient states he is overall doing well. Patient denies any symptoms on ESAS today. He reports since enrolling and completing IOP with psychiatry, he has felt overall much better. He finds that he is able to be more relaxed and has better relationship with family members, including wife. Patient has been continuing to try to work intermittently. He uses pain medication when needed with an active job. Patient having increased insomnia. Trazodone and cpap are not helping at this time. Patient spoke of multiple upcoming events (son in [employer] game for [employer] football then at [employer] week for [employer], two upcoming births of grandbabies, etc)      10/11/2022:  THEE GARCIA reviewed and summarized:  09/22/2022 Gabapentin 300 mg Disp: 90 for 30 days  09/12/2022 Alprazolam 0.5 mg Disp: 60 for 20 days  09/12/2022 Hydrocodone-apap  mg Disp: 90 for 23 days    Patient has met with oncology-psychology and oncology team since last visit. Today patient states he is having a better day, though he presents very depressed. Patient spoke in detail about the challenges that have occurred since audio visit. Patient still experiencing significant depression/anxiety. He only finds some happiness when he is out of his house. He finds himself "playing a role" at home trying to fit in. He has sense of heaviness at home. Patient also questioned if it was okay "not to like the way someone loves you". Patient stated his wife has initiated more physical intimacy and using "her body to get to me". Patient reports he "gave in" after repeated rejections. Patient denying any SI/HI at this time, but he is requesting resources for an inpatient psychiatry stay to help him with his significant mental/emotional health needs. Oncology-psychologist joined visit towards the end. " Dr. Monroy involved after appointment as well. Patient was also contacted via telephone by this writer twice after completion of visit in person and then [facility] followed up with patient on 10/12/22 via phone.     09/30/2022:  Audio only appointment. Please see note for full details of encounter.     09/02/2022:  LA  reviewed and summarized:  08/14/2022 Gabapentin 300 mg Disp: 90 for 30 days  08/09/2022 Hydrocodone-APAP  mg Disp: 90 for 23 days  08/01/2022 Alprazolam 0.5 mg disp: 60 for 20 days    Patient doing well with Abraxane and PO Aplelisib. Patient has lymphedema. Patient has missed or rescheduled multiple appointments since last visit. Today patient states he is doing okay. Patient still having increased anxiety/depression. He is being followed by Dr. Sandoval. He states that his wife has come to those appointments, which has been helpful. Patient is still having some difficulty with communicating his needs with other members of his support system. Patient denies any pain. He is still feeling some fatigue since his covid infection, though he is able to be more active. He reports that he is able to work at his own pace and takes breaks. He is trying to stay hydrated. Patient reports intermittent thoughts at night. His lomotil is working. He is having some difficulty with concentrating.     02/11/2022:  LA  reviewed and summarized:  01/24/2022 Norco  mg Disp: 90 for 23 days    Today patient states he is doing much better. He discussed about how he and his wife had a long discussion, and he finds that they are in a much better place. He is open to suggestions regarding dating his wife and himself. Patient reporting his sleeping has improved too. He still has some diarrhea.     12/13/2021:  LA  reviewed and summarized:  11/11/2021 Norco  mg Disp: 90 for 23 days  11/10/2021 Alprazolam 0.5 mg Disp: 60 for 20 days  11/05/2021 Lomotil 2.5-0.035 mg Disp: 40 for 10 days    Today  patient states his diarrhea has improved with use of lomotil once a day. Patient also had significant improvement in his sleep and energy with use of cpap. Patient has been having increased emotional distress due to multiple stressors. Please see SW note for further details.     10/11/2021:  LA  reviewed and summarized:  10/02/2021 Norco 5-325 mg Disp: 28 for 7 days    Patient presents to clinic by himself today. He reports his pain has significantly improved, and he is using Norco only once a week if that often. Patient is compliant with his gabapentin for neuropathic pain in his lower extremities. He does not sleep much overnight, only 4-4.5 hours. Patient has noticed improvement in his mood with prozac. He does find himself having increase in his emotions at times, both positive and negative. Patient spoke about how the initial diagnosis as well as recurrence has really affected him. He is open to learning about ACP.     Past Medical History:   Diagnosis Date    Breast cancer     Cancer     R breast    Diabetes mellitus     HTN (hypertension)     Leg edema, right     Prediabetes     Seizures     at age 18 - stress related    Therapy     marital counseling     Past Surgical History:   Procedure Laterality Date    AXILLARY NODE DISSECTION Right 11/22/2019    Procedure: LYMPHADENECTOMY, AXILLARY RIGHT;  Surgeon: Shima Whyte MD;  Location: [facility];  Service: General;  Laterality: Right;    AXILLARY NODE DISSECTION Right 12/17/2019    Procedure: LYMPHADENECTOMY, AXILLARY;  Surgeon: Shima Whyte MD;  Location: [address];  Service: General;  Laterality: Right;    BREAST BIOPSY      EXCISION OF HYDROCELE Right 10/28/2022    Procedure: HYDROCELECTOMY;  Surgeon: Anthony Cordero Jr., MD;  Location: [facility] OR [address] [place] [address];  Service: Urology;  Laterality: Right;  1 hour    INSERTION OF TUNNELED CENTRAL VENOUS CATHETER (CVC) WITH SUBCUTANEOUS PORT Left 5/24/2019    Procedure: [id number]-D-CATH;  Surgeon: Shima Whyte  MD;  [facility];  Service: General;  Laterality: Left;    INSERTION OF TUNNELED CENTRAL VENOUS CATHETER (CVC) WITH SUBCUTANEOUS PORT Left 9/17/2021    Procedure: INSERTION, SINGLE LUMEN CATHETER WITH PORT, WITH FLUOROSCOPIC GUIDANCE, right;  Surgeon: Gloria Hollidya MD;  Location: [facility] OR 2ND FLR;  Service: General;  Laterality: Left;  rapid covid test    SENTINEL LYMPH NODE BIOPSY Right 11/22/2019    Procedure: BIOPSY, LYMPH NODE, SENTINEL RIGHT;  Surgeon: Shima Whyte MD;  Location: [facility];  Service: General;  Laterality: Right;    SIMPLE MASTECTOMY Right 11/22/2019    Procedure: MASTECTOMY, SIMPLE RIGHT (CONSENT AM OF) 2.5 hr case;  Surgeon: Shima Whyte MD;  Location: [facility];  Service: General;  Laterality: Right;     Family History   Problem Relation Age of Onset    Hypertension Mother     Diabetes Mother     Hypertension Father     Lupus Father     Post-traumatic stress disorder Brother     No Known Problems Maternal Grandmother     Colon cancer Maternal Grandfather     Breast cancer Paternal Grandmother     No Known Problems Paternal Grandfather     No Known Problems Sister     No Known Problems Maternal Aunt     No Known Problems Maternal Uncle     Fibromyalgia Paternal Aunt     Lupus Paternal Aunt     No Known Problems Paternal Uncle     No Known Problems Brother     No Known Problems Sister     No Known Problems Son     No Known Problems Son     No Known Problems Son     No Known Problems Son      Review of patient's allergies indicates:  No Known Allergies    Medications:    Current Outpatient Medications:     alpelisib (PIQRAY) 300 mg/day (150 mg x 2) Tab, Take 2 tablets (300 mg) by mouth once daily., Disp: 60 tablet, Rfl: 3    ALPRAZolam (XANAX) 0.5 MG tablet, Take 1 tablet (0.5 mg total) by mouth 3 (three) times daily as needed for Insomnia or Anxiety., Disp: 60 tablet, Rfl: 0    amLODIPine (NORVASC) 10 MG tablet, TAKE 1 TABLET (10 MG) BY MOUTH EVERY DAY, Disp: 90 tablet, Rfl:  "3    calcium-vitamin D3 (OYSTER SHELL CALCIUM-VIT D3) 500 mg-5 mcg (200 unit) per tablet, Take 1 tablet by mouth 2 (two) times daily., Disp: 180 tablet, Rfl: 3    diphenoxylate-atropine 2.5-0.025 mg (LOMOTIL) 2.5-0.025 mg per tablet, Take 1 tablet by mouth 4 (four) times daily as needed for Diarrhea., Disp: 60 tablet, Rfl: 2    dulaglutide (TRULICITY) 1.5 mg/0.5 mL pen injector, Inject 1.5 mg into the skin once a week., Disp: 4 pen, Rfl: 2    FLUoxetine 20 MG capsule, Take 2 capsules (40 mg total) by mouth once daily., Disp: 90 capsule, Rfl: 1    gabapentin (NEURONTIN) 300 MG capsule, Take 1 capsule (300 mg total) by mouth 3 (three) times daily., Disp: 90 capsule, Rfl: 11    hydroCHLOROthiazide (HYDRODIURIL) 25 MG tablet, TAKE 1 TABLET BY MOUTH ONCE DAILY, Disp: 90 tablet, Rfl: 3    HYDROcodone-acetaminophen (NORCO)  mg per tablet, Take 1 tablet by mouth every 6 (six) hours as needed for Pain., Disp: 90 tablet, Rfl: 0    ibuprofen (ADVIL,MOTRIN) 600 MG tablet, Take 1 tablet (600 mg total) by mouth every 8 (eight) hours as needed for Pain., Disp: 20 tablet, Rfl: 0    insulin lispro (HUMALOG KWIKPEN INSULIN) 100 unit/mL pen, Inject 5 Units into the skin 3 (three) times daily., Disp: 6 mL, Rfl: 11    lancets 33 gauge Misc, 1 lancet by Misc.(Non-Drug; Combo Route) route once daily., Disp: 100 each, Rfl: 3    losartan (COZAAR) 50 MG tablet, Take 2 tablets by mouth once daily, Disp: 180 tablet, Rfl: 3    pen needle, diabetic (BD ULTRA-FINE TANESHA PEN NEEDLE) 32 gauge x 5/32" Ndle, Use as directed with novolog and levemir, Disp: 100 each, Rfl: 5    tadalafiL (CIALIS) 5 MG tablet, Take 2 tablets (10 mg total) by mouth daily as needed for Erectile Dysfunction., Disp: 20 tablet, Rfl: 1    diazePAM (VALIUM) 5 MG tablet, Take 1 tablet (5 mg total) by mouth every 12 (twelve) hours as needed (muscle spasm)., Disp: 10 tablet, Rfl: 0    insulin detemir U-100 (LEVEMIR FLEXTOUCH U-100 INSULN) 100 unit/mL (3 mL) InPn pen, Inject " 21 Units into the skin every evening., Disp: 6 mL, Rfl: 2    lancing device Misc, 1 Device by Misc.(Non-Drug; Combo Route) route 2 (two) times daily with meals., Disp: 1 each, Rfl: 0    LIDOcaine (LIDODERM) 5 %, Place 1 patch onto the skin once daily. Remove & Discard patch within 12 hours or as directed by MD (Patient not taking: Reported on 2/9/2023), Disp: 7 patch, Rfl: 0    LIDOcaine-prilocaine (EMLA) cream, Apply topically as needed. (Patient not taking: Reported on 2/9/2023), Disp: 30 g, Rfl: 0    metFORMIN (GLUCOPHAGE) 500 MG tablet, Take 2 tablets (1,000 mg total) by mouth 2 (two) times daily with meals. Needs appointment for future refills, Disp: 120 tablet, Rfl: 2    polyethylene glycol (GLYCOLAX) 17 gram/dose powder, Use cap to measure 17g, mix with liquid, and take by mouth once daily., Disp: 238 g, Rfl: 0    traZODone (DESYREL) 100 MG tablet, Take 1 tablet (100 mg total) by mouth every evening., Disp: 30 tablet, Rfl: 0  No current facility-administered medications for this visit.    Facility-Administered Medications Ordered in Other Visits:     0.9%  NaCl infusion, , Intravenous, Continuous, Rosa Linn MD, Stopped at 11/19/21 1634    alteplase injection 2 mg, 2 mg, Intra-Catheter, PRN, Rosa Linn MD    heparin, porcine (PF) 100 unit/mL injection flush 500 Units, 500 Units, Intravenous, 1 time in Clinic/HOD, Richa Bahena MD    heparin, porcine (PF) 100 unit/mL injection flush 500 Units, 500 Units, Intravenous, PRN, Rosa Linn MD    heparin, porcine (PF) 100 unit/mL injection flush 500 Units, 500 Units, Intravenous, PRN, Rosa Linn MD, 500 Units at 11/19/21 1635    sodium chloride 0.9% 250 mL flush bag, , Intravenous, 1 time in Clinic/HOD, Rosa Linn MD    sodium chloride 0.9% flush 10 mL, 10 mL, Intravenous, 1 time in Clinic/HOD, Richa Bahena MD    sodium chloride 0.9% flush 10 mL, 10 mL, Intravenous, PRN, Rosa Linn MD, 10 mL at 11/19/21 1501    sodium chloride 0.9% flush  10 mL, 10 mL, Intravenous, PRN, Rosa Linn MD, 10 mL at 11/19/21 8386    OBJECTIVE:       ROS:  Review of Systems   Constitutional:  Positive for activity change and fatigue (improving).   HENT: Negative.     Eyes: Negative.    Respiratory: Negative.     Cardiovascular: Negative.    Gastrointestinal:  Positive for diarrhea (improved). Negative for abdominal pain and nausea.   Genitourinary: Negative.    Musculoskeletal:  Positive for myalgias.        Right arm lymphedema   Skin: Negative.    Neurological:         + neuropathic pain in bilateral lower extremities   Psychiatric/Behavioral:  Positive for sleep disturbance. Negative for dysphoric mood, self-injury and suicidal ideas. The patient is not nervous/anxious.    All other systems reviewed and are negative.    Review of Symptoms      Symptom Assessment (ESAS 0-10 Scale)  Pain:  0  Dyspnea:  0  Anxiety:  0  Nausea:  0  Depression:  3  Anorexia:  0  Fatigue:  0  Insomnia:  0  Restlessness:  0  Agitation:  0     CAM / Delirium:  Negative  Constipation:  Negative  Diarrhea:  Positive    Anxiety:  Is not nervous/anxious    Bowel Management Plan (BMP):  Yes      Pain Assessment:  OME in 24 hours:  0-15  Location(s): leg    Leg       Location: bilateral        Quality: Tingling        Quantity: 0/10 in intensity month(s) ago, unchanged        Aggravating Factors: None        Alleviating Factors: Opiates        Associated Symptoms: None    Modified Mikal Scale:  0    ECOG Performance Status ndGndrndanddndend:nd nd2nd Living Arrangements:  Lives with spouse    Psychosocial/Cultural:   See Palliative Psychosocial Note: No  Patient lives with his wife. He has three sons (one set of twins)    One son lives at home with his 3 yo grandson.  Brings additional iliana to his life  **Primary  to Follow**  Palliative Care  Consult: No    Spiritual:  F - Mariella and Belief:  Yes  I - Importance:  High  A - Address in Care:  Yes    Advance Care Planning   Advance  Directives:   Living Will: No    LaPOST: No    Do Not Resuscitate Status: No    Medical Power of : No    Agent's Name:  Efraín Li   Agent's Contact Number:  [phone]    Decision Making:  Patient answered questions  Goals of Care: What is most important right now is to focus on symptom/pain control. Accordingly, we have decided that the best plan to meet the patient's goals includes continuing with treatment.        Physical Exam:  Vitals: Temp: 97.2 °F (36.2 °C) (02/09/23 1005)  Pulse: 65 (02/09/23 1005)  BP: 128/62 (02/09/23 1005)  SpO2: 99 % (02/09/23 1005)   Vitals:    02/09/23 1005   BP: 128/62   Pulse: 65   Temp: 97.2 °F (36.2 °C)     Physical Exam  Vitals reviewed.   Constitutional:       General: He is not in acute distress.     Appearance: He is not ill-appearing.   HENT:      Head: Normocephalic and atraumatic.      Right Ear: External ear normal.      Left Ear: External ear normal.   Eyes:      General: No scleral icterus.        Right eye: No discharge.         Left eye: No discharge.   Neck:      Comments: Trachea midline  Pulmonary:      Effort: Pulmonary effort is normal. No respiratory distress.   Abdominal:      General: Abdomen is flat. There is no distension.   Musculoskeletal:         General: Swelling (right arm lymphedema) present. No deformity or signs of injury.      Cervical back: Normal range of motion.   Skin:     Coloration: Skin is not pale.      Findings: No lesion or rash.   Neurological:      Mental Status: He is alert and oriented to person, place, and time.      Gait: Gait normal.   Psychiatric:         Attention and Perception: Attention normal.         Mood and Affect: Mood and affect normal.         Speech: Speech normal.         Cognition and Memory: Cognition normal.         Judgment: Judgment normal.       Labs:  CBC:   WBC   Date Value Ref Range Status   02/03/2023 3.23 (L) 3.90 - 12.70 K/uL Final     Hemoglobin   Date Value Ref Range Status   02/03/2023 9.9  "(L) 14.0 - 18.0 g/dL Final     POC Hematocrit   Date Value Ref Range Status   11/07/2022 35 (L) 36 - 54 %PCV Final     Hematocrit   Date Value Ref Range Status   02/03/2023 32.7 (L) 40.0 - 54.0 % Final     MCV   Date Value Ref Range Status   02/03/2023 81 (L) 82 - 98 fL Final     Platelets   Date Value Ref Range Status   02/03/2023 188 150 - 450 K/uL Final       LFT:   Lab Results   Component Value Date    AST 18 02/03/2023    ALKPHOS 86 02/03/2023    BILITOT 0.4 02/03/2023       Albumin:   Albumin   Date Value Ref Range Status   02/03/2023 3.3 (L) 3.5 - 5.2 g/dL Final     Protein:   Total Protein   Date Value Ref Range Status   02/03/2023 7.1 6.0 - 8.4 g/dL Final       Radiology:I have reviewed all pertinent imaging results/findings within the past 24 hours.    11/04/2022 NM PET CT: "In this patient with breast cancer, there are postoperative changes of right mastectomy and axillary derick dissection.  No evidence of residual/recurrent disease. New multifocal uptake in the axial and appendicular skeleton against a background of probable bone marrow hyperplasia.  Some of the foci are associated with sclerosis and others have no CT correlates.  Differential considerations include active osteoblastic metastasis, osteoblastic response/healing of prior metastasis, and/or new, early CT-occult metastasis.  Tissue sampling would be required for definitive diagnosis."    65 minutes of total time spent on the encounter, which includes face to face time and non-face to face time preparing to see the patient (eg, review of tests), Obtaining and/or reviewing separately obtained history, Documenting clinical information in the electronic or other health record, Independently interpreting results (not separately reported) and communicating results to the patient/family/caregiver, or Care coordination (not separately reported).       Signature: Prasanna Brooks MD                    "

## 2023-02-09 NOTE — PROGRESS NOTES
"                                                    Physical Therapy Daily Treatment Note       Date: 2/9/2023   Name: Paul Li Jr.  Clinic Number: [id number]    Therapy Diagnosis:   Encounter Diagnoses   Name Primary?    Lymphedema of right arm Yes    Impaired functional mobility and activity tolerance      Physician: Rosa Linn MD    Physician Orders: PT Eval and Treat -RUE lymphedema  Medical Diagnosis: Malignant neoplasm involving both nipple and areola of right breast in male, estrogen receptor negative [C50.021, Z17.1], Lymphedema of right upper extremity   Evaluation Date: 10/10/2022  Authorization Period Expiration: 10/14/22  Updated Plan of Care Certification Period: 2/10/23 (12 weeks)  Visit # / Visits authorized: 19 (6/12 newly authorized)   Insurance: [employer]  FOTO: 2/3     Time In: 9:00 AM   Time Out: 9:55 AM  Total Billable Time: 53 minutes     Precautions: Standard, Fall, cancer, and Spine, Bony Mets, hx of Seizures, L chest wall port      Subjective     Pt reports: He removed bandages prior to PT session. No new complaints  He was compliant with self manual lymph drainage  Response to previous treatment: no adverse reaction  Pain Scale: Paul rates pain on a scale of 0/10 on VAS.   Pain location: N/A    Fatigue: "feel fine"  Functional change: none stated  Treatment:   Chemotherapy: te-adjuvant chemo therapy completed 10/19  Pt is currently on chemotherapy: pt is getting 1 infusion/week for 3 weeks with 1 week break, and he takes oral chemotherapy daily.  Radiation: Completed in February 2020  Surgery date: 12/17/19 pt underwent right axillary lymph node dissection and resection excess lateral tissue; 11/22/19 right simple mastectomy with SLNB; total of 18 lymph nodes removed      Objective     Objective Measures updated at progress report unless specified.     [place]MARK RIGHT UE LEFT UE DIFF RUE Diff RUE Diff RUE Diff RUE Diff RUE Diff RUE Diff RUE Diff RUE Diff RUE Diff " "RUE Diff RUE Diff RUE Diff   Date Eval Eval Eval 10/17/22 10/17/22 10/29/22 10/19/22 11/2/22 11/2/22 11/11/22 11/11/22 11/29/22 11/29/22 12/5/22 12/5/22 12/13/22 12/13/22 12/21/22 12/21/22 12/30/22 12/30/22 1/6/23 1/6/23 1/26/23 1/26/23 2/6/23 2/6/23   E + 8" 50 cm 46.5 cm 3.5 cm 48 cm -2 47.5 cm -0.5 50.5 cm +3 47.5 cm -3 49.5 cm +2 50.5 cm +1 48.5 cm -2 49 cm +0.5 48 cm -1 50 cm +2 48.5 cm -1.5 49 cm +0.5   E + 6" 45.5 cm 44 cm 1.5 cm 45 cm -0.5 46.5 cm +1.5 47 cm +0.5 45 cm -2 45.5 cm +0.5 46 cm +0.5 45 cm -1 45.5 cm +0.5 45.5 cm No chg 47 cm +1.5 45 cm -2 45.5 cm +0.5   E + 4" 40 cm 40.5 cm 0.5 cm 40.5 cm +0.5 40.5cm No chg 41 cm +0.5 40.5 cm -0.5 40.5 cm No chg 40 cm -0.5 41.5 cm +1.5  41 cm -0.5 41 cm No chg 42.5 cm +1.5 40 cm -2.5 41.5 cm +1.5   E + 2" 38.5 cm 38.5 cm 0 cm 39 cm +0.5 40 cm +1 39.5 cm -0.5 38.5 cm -1 38 cm -0.5 39 cm +1 39 cm No chg 39 cm No chg 39 cm No chg 39.5 cm +0.5 38.5 cm -1 37.5 cm -1   Elbow 36.5 cm 34 cm 2.5 cm 35.5 cm -1 35.5 cm No chg 36 cm +0.5 34.5 cm -1.5 36 cm +1.5 36 cm No chg 35 cm -1 36 cm +1 35.5 cm -0.5 37.5 cm +2 35 cm -2.5 35 cm No chg   W+ 8" 37.5cm 33 cm 4.5 cm 38cm +0.5 36.5 cm -1.5 37.5 cm +1 36.5 cm -1 37.5 cm +1 37.5 cm No chg 37 cm -0.5 37 cm No chg 37 cm No chg 38 cm +1 37 cm -1 37.5 cm +0.5   W +  6" 35 cm 29.5 cm 5.5 cm 34 cm -1 34.5 cm +0.5 33.5 cm -1 32.5 cm -1 33 cm +0.5 33 cm No chg 33cm No chg 34.5 cm +1.5 34.5 cm No chg 36 cm +1.5 33.5 cm -2.5 34.5 cm +1   W + 4" 31.5 cm 25 cm 6.5 cm 31.5 cm No chg 31.5 cm No chg 29.5 cm -2 29 cm -0.5 30 cm +1 28 cm -2 29 cm +1 29 cm No chg 29.5 cm +0.5 32 cm +2.5 30 cm -2 30 cm No chg   Wrist 23 cm 20 cm 3 cm 22.5cm -0.5 21.5 cm -1 23 cm +1.5 21 cm -2 24 cm +3 22 cm -2 22 cm No chg 22.5 cm +0.5 23 cm +0.5 23 cm No chg 22 cm -1 21 cm -1   DPC 28.5 cm 26 cm 2.5 cm 28 cm -0.5 27.5cm -0.5 28.5 cm +1 27 cm -1.5 28 cm +1 27 cm -1 26.5 cm -0.5 28 cm +1.5 27.5 cm -0.5 27 cm -0.5 28 cm +1 25.5 cm -2.5   IP Thumb 8.5 cm 8 cm 0.5 " cm 8.5 cm No chg 8.5 cm No chg 9 cm +0.5 8.5 cm -0.5 8.5 cm No chg 9 cm +0.5 8.5 cm -0.5 8.5 cm No chg 8.5 cm No chg 9 cm +0.5 9 cm No chg 8.5 cm -0.5            Treatment     Paul received individual therapeutic exercises to improve postural correction and alignment, stretching and soft tissue mobility, and strengthening for 12 minutes including the following:     Diaphragmatic breathing, 2 x 5 reps  Scapular retractions  20 reps  Shoulder rolls, 10 reps each fwd and back  Chicken wings x 10  Head turns x 5 reps B  Head tilts x 5 reps B            Paul received the following manual therapy techniques were performed to increased myofascial/soft tissue length, mobility and pliability, increase PROM, AROM and function as well as to decrease pain for 41 minutes    Short Neck- held due to history of seizures  Clear Healthy Lymph Nodes:  Left Axilla                                                  Right Groin  Open Anastomoses:  Anterior Axillo-Axillary  (AAA)                                    Axillo-Inguinal     (AI)                                    Posterior Axillo-Axillary  (GEREMIAS)     Right Upper Arm (Lateral and Medial)  Right  Antecubital Fossa  Right Dorsal Forearm  Right Volar Forearm  Dorsum Right Hand  Right fingers     Rework Right UE  Rework Anastomoses  Rework Healthy lymph Nodes      PT applies gauze to right fingers and hand, stockinette, cotton artiflex, and short stretch bandages to pt's RIGHT/LEFT/BILATERAL: right upper extremity. PT places size H tubigrip over upper arm to assist in keeping bandages up. Pt educated to wear bandaging until next session unless it causes pain, numbness, or changes in skin color/temperature, or if they start to fall down.  If removed, pt instructed to roll up bandages and cotton padding and bring to next session. If bandages are removed, pt can wear his tubigrip.  Pt has Vet glove to cover bandages in the shower, and we reviewed directions on how to care for bandages. Pt  verbalizes understanding of all topics.                Home Exercise Program and Patient Education   Education provided re:  - role of PT in multi - disciplinary team, goals for PT  - progress towards goals         Written Home Exercises Provided: Patient instructed to cont prior HEP.  Exercises were reviewed and Paul was able to demonstrate them prior to the end of the session.  Paul demonstrated good  understanding of the education provided.     See EMR under Media for self lymphatic drainage provided prior visit.    Pt has no cultural, educational or language barriers to learning provided.    Assessment     Patient tolerated session well.  He has been compliant with exercise, elevation, self manual lymph drainage, and wearing compression bandages/tubigrip, yet lymphedema persists. He tolerated complete decongestive therapy well. Once his measurements stabilize, he will benefit from a compression sleeve and glove, and he will also benefit from a compression pump to manage his symptoms. He has met goals as noted below and will benefit from continued therapy to work towards remaining goals.      Pt prognosis is Good. Pt will continue to benefit from skilled outpatient physical therapy to address the deficits listed in the problem list chart on initial evaluation, provide pt/family education and to maximize pt's level of independence in the home and community environment.     Anticipated barriers to physical therapy: advanced disease    Goals as follows:  Short Term Goals (6 weeks)  1. Pt will demo decrease in girth in right upper extremity by >/= 2cm to allow for improved UE symmetry, clothing choice, ROM, and UE function. (Met, 1/26/23  2. Patient will demonstrate 100% knowledge of lymphedema precautions and signs of infection to allow for reduced risk of lymphedema, reduced risk of infection, and/or exacerbation.  (met)  3. Patient will perform self lymph drainage techniques to enhance lymphatic drainage and  mobility.  ( met)  4. Patient will tolerate daily activities with multilayered bandaging to enhance lymphatic drainage and skin elasticity.  (met)  5. Pt will tolerate HEP for improved strength, functional mobility, ROM, posture, and endurance. (met)        Long Term Goals: ( 12  weeks)  1. Patient to show decreased girth in right upper extremity by >/= 3cm to allow for improved UE symmetry, clothing choice, ROM, and UE function.  (progressing, not met)  2. Patient will show reduction in density to mild or less with improved contour of limb to allow for improved cosmesis, improved lymphatic drainage, and functional mobility.  (progressing, not met)  3. Patient to bruna/doff compression garment with daily compliance to support lymphatic mobility and skin elasticity.  (progressing, not met)  4. Pt to show improved postural awareness and alignment for improved functional mobility.  (progressing, not met)  5. Pt to be independent and compliant with HEP to allow for improved ROM, reach and carry with functional endurance and strength.    (progressing, not met)           Plan   Outpatient physical therapy 2x week for 5weeks to include the following:   Manual Therapy, Neuromuscular Re-ed, Orthotic Management and Training, Patient Education, Self Care, Therapeutic Activities, and Therapeutic Exercise.     Plan of care Certification Period: 1/6/23 to 2/10/23       Therapist: Katelynn Harrell, PT  2/9/2023

## 2023-02-10 ENCOUNTER — INFUSION (OUTPATIENT)
Dept: INFUSION THERAPY | Facility: HOSPITAL | Age: 48
End: 2023-02-10
Attending: INTERNAL MEDICINE
Payer: MEDICARE

## 2023-02-10 VITALS
OXYGEN SATURATION: 100 % | HEIGHT: 73 IN | BODY MASS INDEX: 41.75 KG/M2 | HEART RATE: 61 BPM | WEIGHT: 315 LBS | TEMPERATURE: 98 F | DIASTOLIC BLOOD PRESSURE: 80 MMHG | RESPIRATION RATE: 18 BRPM | SYSTOLIC BLOOD PRESSURE: 144 MMHG

## 2023-02-10 DIAGNOSIS — C50.021 MALIGNANT NEOPLASM INVOLVING BOTH NIPPLE AND AREOLA OF RIGHT BREAST IN MALE, ESTROGEN RECEPTOR NEGATIVE: Primary | ICD-10-CM

## 2023-02-10 DIAGNOSIS — Z17.1 MALIGNANT NEOPLASM INVOLVING BOTH NIPPLE AND AREOLA OF RIGHT BREAST IN MALE, ESTROGEN RECEPTOR NEGATIVE: Primary | ICD-10-CM

## 2023-02-10 PROCEDURE — 96413 CHEMO IV INFUSION 1 HR: CPT

## 2023-02-10 PROCEDURE — 96361 HYDRATE IV INFUSION ADD-ON: CPT

## 2023-02-10 PROCEDURE — 63600175 PHARM REV CODE 636 W HCPCS: Mod: JG | Performed by: INTERNAL MEDICINE

## 2023-02-10 PROCEDURE — A4216 STERILE WATER/SALINE, 10 ML: HCPCS | Performed by: INTERNAL MEDICINE

## 2023-02-10 PROCEDURE — 25000003 PHARM REV CODE 250: Performed by: INTERNAL MEDICINE

## 2023-02-10 PROCEDURE — 96360 HYDRATION IV INFUSION INIT: CPT

## 2023-02-10 RX ORDER — SODIUM CHLORIDE 0.9 % (FLUSH) 0.9 %
10 SYRINGE (ML) INJECTION
Status: DISCONTINUED | OUTPATIENT
Start: 2023-02-10 | End: 2023-02-10 | Stop reason: HOSPADM

## 2023-02-10 RX ORDER — SODIUM CHLORIDE 0.9 % (FLUSH) 0.9 %
10 SYRINGE (ML) INJECTION
Status: CANCELLED | OUTPATIENT
Start: 2023-02-10

## 2023-02-10 RX ORDER — SODIUM CHLORIDE 9 MG/ML
INJECTION, SOLUTION INTRAVENOUS CONTINUOUS
Status: DISCONTINUED | OUTPATIENT
Start: 2023-02-10 | End: 2023-02-10 | Stop reason: HOSPADM

## 2023-02-10 RX ORDER — HEPARIN 100 UNIT/ML
500 SYRINGE INTRAVENOUS
Status: CANCELLED | OUTPATIENT
Start: 2023-02-10

## 2023-02-10 RX ORDER — SODIUM CHLORIDE 9 MG/ML
INJECTION, SOLUTION INTRAVENOUS CONTINUOUS
Status: CANCELLED | OUTPATIENT
Start: 2023-02-10

## 2023-02-10 RX ORDER — HEPARIN 100 UNIT/ML
500 SYRINGE INTRAVENOUS
Status: DISCONTINUED | OUTPATIENT
Start: 2023-02-10 | End: 2023-02-10 | Stop reason: HOSPADM

## 2023-02-10 RX ADMIN — PACLITAXEL 220 MG: 100 INJECTION, POWDER, LYOPHILIZED, FOR SUSPENSION INTRAVENOUS at 10:02

## 2023-02-10 RX ADMIN — Medication 10 ML: at 10:02

## 2023-02-10 RX ADMIN — SODIUM CHLORIDE: 0.9 INJECTION, SOLUTION INTRAVENOUS at 09:02

## 2023-02-10 RX ADMIN — HEPARIN 500 UNITS: 100 SYRINGE at 10:02

## 2023-02-10 NOTE — PLAN OF CARE
Pt received Abraxane today and tolerated well, without complications. Educated patient about Abraxane (indications, side effects, possible reactions, chemotherapy precautions) and verbalized understanding.  VSS. CW port positive for blood return, saline flushed, Heparin flush instilled to dwell and de accessed prior to DC. Pt DC with no distress noted, ambulated off unit, w/o event, via self, pleased.

## 2023-02-20 ENCOUNTER — DOCUMENTATION ONLY (OUTPATIENT)
Dept: REHABILITATION | Facility: HOSPITAL | Age: 48
End: 2023-02-20

## 2023-02-20 ENCOUNTER — PATIENT MESSAGE (OUTPATIENT)
Dept: REHABILITATION | Facility: HOSPITAL | Age: 48
End: 2023-02-20

## 2023-02-20 NOTE — PROGRESS NOTES
Pt sent message over portal that he has to cancel today's PT appointment due to transportation issue.  He states he will be at next appointment on 2/23/23 at 9am.

## 2023-02-23 ENCOUNTER — OFFICE VISIT (OUTPATIENT)
Dept: HEMATOLOGY/ONCOLOGY | Facility: CLINIC | Age: 48
End: 2023-02-23
Payer: MEDICARE

## 2023-02-23 ENCOUNTER — PATIENT MESSAGE (OUTPATIENT)
Dept: HEMATOLOGY/ONCOLOGY | Facility: CLINIC | Age: 48
End: 2023-02-23

## 2023-02-23 ENCOUNTER — LAB VISIT (OUTPATIENT)
Dept: LAB | Facility: HOSPITAL | Age: 48
End: 2023-02-23
Attending: INTERNAL MEDICINE
Payer: MEDICARE

## 2023-02-23 VITALS
TEMPERATURE: 98 F | SYSTOLIC BLOOD PRESSURE: 138 MMHG | WEIGHT: 315 LBS | OXYGEN SATURATION: 100 % | HEART RATE: 76 BPM | DIASTOLIC BLOOD PRESSURE: 68 MMHG | BODY MASS INDEX: 41.75 KG/M2 | HEIGHT: 73 IN | RESPIRATION RATE: 17 BRPM

## 2023-02-23 DIAGNOSIS — Z17.1 MALIGNANT NEOPLASM INVOLVING BOTH NIPPLE AND AREOLA OF RIGHT BREAST IN MALE, ESTROGEN RECEPTOR NEGATIVE: Primary | ICD-10-CM

## 2023-02-23 DIAGNOSIS — Z79.4 TYPE 2 DIABETES MELLITUS WITHOUT COMPLICATION, WITH LONG-TERM CURRENT USE OF INSULIN: ICD-10-CM

## 2023-02-23 DIAGNOSIS — F43.23 ADJUSTMENT DISORDER WITH MIXED ANXIETY AND DEPRESSED MOOD: ICD-10-CM

## 2023-02-23 DIAGNOSIS — D64.81 ANEMIA ASSOCIATED WITH CHEMOTHERAPY: ICD-10-CM

## 2023-02-23 DIAGNOSIS — C50.021 MALIGNANT NEOPLASM INVOLVING BOTH NIPPLE AND AREOLA OF RIGHT BREAST IN MALE, ESTROGEN RECEPTOR NEGATIVE: Primary | ICD-10-CM

## 2023-02-23 DIAGNOSIS — T45.1X5A ANEMIA ASSOCIATED WITH CHEMOTHERAPY: ICD-10-CM

## 2023-02-23 DIAGNOSIS — G89.3 NEOPLASM RELATED PAIN: ICD-10-CM

## 2023-02-23 DIAGNOSIS — T45.1X5A CHEMOTHERAPY-INDUCED NEUTROPENIA: ICD-10-CM

## 2023-02-23 DIAGNOSIS — D70.1 CHEMOTHERAPY-INDUCED NEUTROPENIA: ICD-10-CM

## 2023-02-23 DIAGNOSIS — C50.919 BREAST CANCER: ICD-10-CM

## 2023-02-23 DIAGNOSIS — C79.51 BONE METASTASES: ICD-10-CM

## 2023-02-23 DIAGNOSIS — E11.9 TYPE 2 DIABETES MELLITUS WITHOUT COMPLICATION, WITH LONG-TERM CURRENT USE OF INSULIN: ICD-10-CM

## 2023-02-23 DIAGNOSIS — I10 ESSENTIAL HYPERTENSION: ICD-10-CM

## 2023-02-23 LAB
ALBUMIN SERPL BCP-MCNC: 3.5 G/DL (ref 3.5–5.2)
ALP SERPL-CCNC: 99 U/L (ref 55–135)
ALT SERPL W/O P-5'-P-CCNC: 13 U/L (ref 10–44)
ANION GAP SERPL CALC-SCNC: 10 MMOL/L (ref 8–16)
AST SERPL-CCNC: 16 U/L (ref 10–40)
BASOPHILS # BLD AUTO: 0.01 K/UL (ref 0–0.2)
BASOPHILS NFR BLD: 0.3 % (ref 0–1.9)
BILIRUB SERPL-MCNC: 0.5 MG/DL (ref 0.1–1)
BUN SERPL-MCNC: 13 MG/DL (ref 6–20)
CALCIUM SERPL-MCNC: 8.9 MG/DL (ref 8.7–10.5)
CHLORIDE SERPL-SCNC: 106 MMOL/L (ref 95–110)
CO2 SERPL-SCNC: 21 MMOL/L (ref 23–29)
CREAT SERPL-MCNC: 1 MG/DL (ref 0.5–1.4)
DIFFERENTIAL METHOD: ABNORMAL
EOSINOPHIL # BLD AUTO: 0 K/UL (ref 0–0.5)
EOSINOPHIL NFR BLD: 0.3 % (ref 0–8)
ERYTHROCYTE [DISTWIDTH] IN BLOOD BY AUTOMATED COUNT: 18.1 % (ref 11.5–14.5)
EST. GFR  (NO RACE VARIABLE): >60 ML/MIN/1.73 M^2
GLUCOSE SERPL-MCNC: 97 MG/DL (ref 70–110)
HCT VFR BLD AUTO: 33.6 % (ref 40–54)
HGB BLD-MCNC: 10.5 G/DL (ref 14–18)
IMM GRANULOCYTES # BLD AUTO: 0.03 K/UL (ref 0–0.04)
IMM GRANULOCYTES NFR BLD AUTO: 0.8 % (ref 0–0.5)
LYMPHOCYTES # BLD AUTO: 1.1 K/UL (ref 1–4.8)
LYMPHOCYTES NFR BLD: 29.7 % (ref 18–48)
MCH RBC QN AUTO: 25.1 PG (ref 27–31)
MCHC RBC AUTO-ENTMCNC: 31.3 G/DL (ref 32–36)
MCV RBC AUTO: 80 FL (ref 82–98)
MONOCYTES # BLD AUTO: 0.6 K/UL (ref 0.3–1)
MONOCYTES NFR BLD: 15.4 % (ref 4–15)
NEUTROPHILS # BLD AUTO: 2 K/UL (ref 1.8–7.7)
NEUTROPHILS NFR BLD: 53.5 % (ref 38–73)
NRBC BLD-RTO: 0 /100 WBC
PLATELET # BLD AUTO: 242 K/UL (ref 150–450)
PMV BLD AUTO: 9.9 FL (ref 9.2–12.9)
POTASSIUM SERPL-SCNC: 4.1 MMOL/L (ref 3.5–5.1)
PROT SERPL-MCNC: 7.1 G/DL (ref 6–8.4)
RBC # BLD AUTO: 4.18 M/UL (ref 4.6–6.2)
SODIUM SERPL-SCNC: 137 MMOL/L (ref 136–145)
WBC # BLD AUTO: 3.77 K/UL (ref 3.9–12.7)

## 2023-02-23 PROCEDURE — 3075F PR MOST RECENT SYSTOLIC BLOOD PRESS GE 130-139MM HG: ICD-10-PCS | Mod: CPTII,S$GLB,, | Performed by: NURSE PRACTITIONER

## 2023-02-23 PROCEDURE — 99215 PR OFFICE/OUTPT VISIT, EST, LEVL V, 40-54 MIN: ICD-10-PCS | Mod: S$GLB,,, | Performed by: NURSE PRACTITIONER

## 2023-02-23 PROCEDURE — 3078F PR MOST RECENT DIASTOLIC BLOOD PRESSURE < 80 MM HG: ICD-10-PCS | Mod: CPTII,S$GLB,, | Performed by: NURSE PRACTITIONER

## 2023-02-23 PROCEDURE — 36415 COLL VENOUS BLD VENIPUNCTURE: CPT | Performed by: INTERNAL MEDICINE

## 2023-02-23 PROCEDURE — 99999 PR PBB SHADOW E&M-EST. PATIENT-LVL IV: CPT | Mod: PBBFAC,,, | Performed by: NURSE PRACTITIONER

## 2023-02-23 PROCEDURE — 1159F MED LIST DOCD IN RCRD: CPT | Mod: CPTII,S$GLB,, | Performed by: NURSE PRACTITIONER

## 2023-02-23 PROCEDURE — 3008F PR BODY MASS INDEX (BMI) DOCUMENTED: ICD-10-PCS | Mod: CPTII,S$GLB,, | Performed by: NURSE PRACTITIONER

## 2023-02-23 PROCEDURE — 4010F PR ACE/ARB THEARPY RXD/TAKEN: ICD-10-PCS | Mod: CPTII,S$GLB,, | Performed by: NURSE PRACTITIONER

## 2023-02-23 PROCEDURE — 3078F DIAST BP <80 MM HG: CPT | Mod: CPTII,S$GLB,, | Performed by: NURSE PRACTITIONER

## 2023-02-23 PROCEDURE — 80053 COMPREHEN METABOLIC PANEL: CPT | Performed by: INTERNAL MEDICINE

## 2023-02-23 PROCEDURE — 3008F BODY MASS INDEX DOCD: CPT | Mod: CPTII,S$GLB,, | Performed by: NURSE PRACTITIONER

## 2023-02-23 PROCEDURE — 3075F SYST BP GE 130 - 139MM HG: CPT | Mod: CPTII,S$GLB,, | Performed by: NURSE PRACTITIONER

## 2023-02-23 PROCEDURE — 85025 COMPLETE CBC W/AUTO DIFF WBC: CPT | Performed by: INTERNAL MEDICINE

## 2023-02-23 PROCEDURE — 1159F PR MEDICATION LIST DOCUMENTED IN MEDICAL RECORD: ICD-10-PCS | Mod: CPTII,S$GLB,, | Performed by: NURSE PRACTITIONER

## 2023-02-23 PROCEDURE — 99999 PR PBB SHADOW E&M-EST. PATIENT-LVL IV: ICD-10-PCS | Mod: PBBFAC,,, | Performed by: NURSE PRACTITIONER

## 2023-02-23 PROCEDURE — 99215 OFFICE O/P EST HI 40 MIN: CPT | Mod: S$GLB,,, | Performed by: NURSE PRACTITIONER

## 2023-02-23 PROCEDURE — 4010F ACE/ARB THERAPY RXD/TAKEN: CPT | Mod: CPTII,S$GLB,, | Performed by: NURSE PRACTITIONER

## 2023-02-23 RX ORDER — SODIUM CHLORIDE 9 MG/ML
INJECTION, SOLUTION INTRAVENOUS CONTINUOUS
Status: CANCELLED | OUTPATIENT
Start: 2023-03-03

## 2023-02-23 RX ORDER — SODIUM CHLORIDE 9 MG/ML
INJECTION, SOLUTION INTRAVENOUS CONTINUOUS
Status: CANCELLED | OUTPATIENT
Start: 2023-02-24

## 2023-02-23 RX ORDER — LIDOCAINE 50 MG/G
1 PATCH TOPICAL DAILY
Qty: 7 PATCH | Refills: 0 | Status: SHIPPED | OUTPATIENT
Start: 2023-02-23 | End: 2023-03-10 | Stop reason: SDUPTHER

## 2023-02-23 RX ORDER — SODIUM CHLORIDE 0.9 % (FLUSH) 0.9 %
10 SYRINGE (ML) INJECTION
Status: CANCELLED | OUTPATIENT
Start: 2023-02-24

## 2023-02-23 RX ORDER — SODIUM CHLORIDE 9 MG/ML
INJECTION, SOLUTION INTRAVENOUS CONTINUOUS
Status: CANCELLED | OUTPATIENT
Start: 2023-03-11

## 2023-02-23 RX ORDER — HEPARIN 100 UNIT/ML
500 SYRINGE INTRAVENOUS
Status: CANCELLED | OUTPATIENT
Start: 2023-03-03

## 2023-02-23 RX ORDER — SODIUM CHLORIDE 0.9 % (FLUSH) 0.9 %
10 SYRINGE (ML) INJECTION
Status: CANCELLED | OUTPATIENT
Start: 2023-03-03

## 2023-02-23 RX ORDER — HEPARIN 100 UNIT/ML
500 SYRINGE INTRAVENOUS
Status: CANCELLED | OUTPATIENT
Start: 2023-03-11

## 2023-02-23 RX ORDER — SODIUM CHLORIDE 0.9 % (FLUSH) 0.9 %
10 SYRINGE (ML) INJECTION
Status: CANCELLED | OUTPATIENT
Start: 2023-03-11

## 2023-02-23 RX ORDER — HEPARIN 100 UNIT/ML
500 SYRINGE INTRAVENOUS
Status: CANCELLED | OUTPATIENT
Start: 2023-02-24
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
## 2023-02-24 ENCOUNTER — PATIENT MESSAGE (OUTPATIENT)
Dept: PSYCHIATRY | Facility: CLINIC | Age: 48
End: 2023-02-24
Payer: MEDICARE

## 2023-02-24 ENCOUNTER — INFUSION (OUTPATIENT)
Dept: INFUSION THERAPY | Facility: HOSPITAL | Age: 48
End: 2023-02-24
Payer: MEDICARE

## 2023-02-24 VITALS
RESPIRATION RATE: 18 BRPM | DIASTOLIC BLOOD PRESSURE: 73 MMHG | TEMPERATURE: 98 F | OXYGEN SATURATION: 98 % | HEART RATE: 66 BPM | SYSTOLIC BLOOD PRESSURE: 139 MMHG

## 2023-02-24 DIAGNOSIS — C50.021 MALIGNANT NEOPLASM INVOLVING BOTH NIPPLE AND AREOLA OF RIGHT BREAST IN MALE, ESTROGEN RECEPTOR NEGATIVE: Primary | ICD-10-CM

## 2023-02-24 DIAGNOSIS — C79.51 BONE METASTASES: ICD-10-CM

## 2023-02-24 DIAGNOSIS — Z17.1 MALIGNANT NEOPLASM INVOLVING BOTH NIPPLE AND AREOLA OF RIGHT BREAST IN MALE, ESTROGEN RECEPTOR NEGATIVE: Primary | ICD-10-CM

## 2023-02-24 DIAGNOSIS — G89.3 NEOPLASM RELATED PAIN: ICD-10-CM

## 2023-02-24 PROCEDURE — 96413 CHEMO IV INFUSION 1 HR: CPT

## 2023-02-24 PROCEDURE — 63600175 PHARM REV CODE 636 W HCPCS: Performed by: INTERNAL MEDICINE

## 2023-02-24 PROCEDURE — 96361 HYDRATE IV INFUSION ADD-ON: CPT

## 2023-02-24 PROCEDURE — 96367 TX/PROPH/DG ADDL SEQ IV INF: CPT

## 2023-02-24 PROCEDURE — 25000003 PHARM REV CODE 250: Performed by: NURSE PRACTITIONER

## 2023-02-24 PROCEDURE — 63600175 PHARM REV CODE 636 W HCPCS: Mod: JG | Performed by: NURSE PRACTITIONER

## 2023-02-24 RX ORDER — SODIUM CHLORIDE 0.9 % (FLUSH) 0.9 %
10 SYRINGE (ML) INJECTION
Status: DISCONTINUED | OUTPATIENT
Start: 2023-02-24 | End: 2023-02-24 | Stop reason: HOSPADM

## 2023-02-24 RX ORDER — SODIUM CHLORIDE 9 MG/ML
INJECTION, SOLUTION INTRAVENOUS CONTINUOUS
Status: DISCONTINUED | OUTPATIENT
Start: 2023-02-24 | End: 2023-02-24 | Stop reason: HOSPADM

## 2023-02-24 RX ORDER — HEPARIN 100 UNIT/ML
500 SYRINGE INTRAVENOUS
Status: DISCONTINUED | OUTPATIENT
Start: 2023-02-24 | End: 2023-02-24 | Stop reason: HOSPADM

## 2023-02-24 RX ORDER — SODIUM CHLORIDE 0.9 % (FLUSH) 0.9 %
10 SYRINGE (ML) INJECTION
Status: CANCELLED | OUTPATIENT
Start: 2023-03-17

## 2023-02-24 RX ORDER — HEPARIN 100 UNIT/ML
500 SYRINGE INTRAVENOUS
Status: CANCELLED | OUTPATIENT
Start: 2023-03-17

## 2023-02-24 RX ORDER — HYDROCODONE BITARTRATE AND ACETAMINOPHEN 10; 325 MG/1; MG/1
1 TABLET ORAL EVERY 6 HOURS PRN
Qty: 90 TABLET | Refills: 0 | Status: SHIPPED | OUTPATIENT
Start: 2023-02-24 | End: 2023-03-21 | Stop reason: SDUPTHER

## 2023-02-24 RX ORDER — ZOLEDRONIC ACID 0.04 MG/ML
4 INJECTION, SOLUTION INTRAVENOUS
Status: CANCELLED | OUTPATIENT
Start: 2023-03-17

## 2023-02-24 RX ORDER — ZOLEDRONIC ACID 0.04 MG/ML
4 INJECTION, SOLUTION INTRAVENOUS
Status: COMPLETED | OUTPATIENT
Start: 2023-02-24 | End: 2023-02-24

## 2023-02-24 RX ADMIN — ZOLEDRONIC ACID 4 MG: 0.04 INJECTION, SOLUTION INTRAVENOUS at 09:02

## 2023-02-24 RX ADMIN — PACLITAXEL 220 MG: 100 INJECTION, POWDER, LYOPHILIZED, FOR SUSPENSION INTRAVENOUS at 10:02

## 2023-02-24 RX ADMIN — SODIUM CHLORIDE: 9 INJECTION, SOLUTION INTRAVENOUS at 09:02

## 2023-02-24 RX ADMIN — HEPARIN 500 UNITS: 100 SYRINGE at 11:02

## 2023-02-24 NOTE — PLAN OF CARE
Pt ambulatory to clinic alone for IVF's, Abraxane and Zometa infusions. Denies any sig complaints. Port accessed without difficulty. Pt tolerated infusions well. Port deaccessed after flushing. Pt ambulatory from clinic in [facility].

## 2023-02-27 ENCOUNTER — OFFICE VISIT (OUTPATIENT)
Dept: PALLIATIVE MEDICINE | Facility: CLINIC | Age: 48
End: 2023-02-27
Payer: MEDICARE

## 2023-02-27 VITALS
SYSTOLIC BLOOD PRESSURE: 129 MMHG | HEIGHT: 73 IN | HEART RATE: 78 BPM | OXYGEN SATURATION: 98 % | BODY MASS INDEX: 41.75 KG/M2 | DIASTOLIC BLOOD PRESSURE: 68 MMHG | TEMPERATURE: 97 F | WEIGHT: 315 LBS

## 2023-02-27 DIAGNOSIS — G47.00 INSOMNIA, UNSPECIFIED TYPE: ICD-10-CM

## 2023-02-27 DIAGNOSIS — C79.51 BONE METASTASES: ICD-10-CM

## 2023-02-27 DIAGNOSIS — M79.2 NEUROPATHIC PAIN: ICD-10-CM

## 2023-02-27 DIAGNOSIS — G89.3 CANCER RELATED PAIN: ICD-10-CM

## 2023-02-27 DIAGNOSIS — R53.0 NEOPLASTIC (MALIGNANT) RELATED FATIGUE: ICD-10-CM

## 2023-02-27 DIAGNOSIS — Z17.1 MALIGNANT NEOPLASM INVOLVING BOTH NIPPLE AND AREOLA OF RIGHT BREAST IN MALE, ESTROGEN RECEPTOR NEGATIVE: ICD-10-CM

## 2023-02-27 DIAGNOSIS — F32.A DEPRESSION, UNSPECIFIED DEPRESSION TYPE: ICD-10-CM

## 2023-02-27 DIAGNOSIS — F43.23 ADJUSTMENT DISORDER WITH MIXED ANXIETY AND DEPRESSED MOOD: ICD-10-CM

## 2023-02-27 DIAGNOSIS — R19.7 DIARRHEA, UNSPECIFIED TYPE: ICD-10-CM

## 2023-02-27 DIAGNOSIS — Z51.5 ENCOUNTER FOR PALLIATIVE CARE: Primary | ICD-10-CM

## 2023-02-27 DIAGNOSIS — C50.021 MALIGNANT NEOPLASM INVOLVING BOTH NIPPLE AND AREOLA OF RIGHT BREAST IN MALE, ESTROGEN RECEPTOR NEGATIVE: ICD-10-CM

## 2023-02-27 PROCEDURE — 4010F PR ACE/ARB THEARPY RXD/TAKEN: ICD-10-PCS | Mod: CPTII,S$GLB,, | Performed by: NURSE PRACTITIONER

## 2023-02-27 PROCEDURE — 1159F MED LIST DOCD IN RCRD: CPT | Mod: CPTII,S$GLB,, | Performed by: NURSE PRACTITIONER

## 2023-02-27 PROCEDURE — 99215 PR OFFICE/OUTPT VISIT, EST, LEVL V, 40-54 MIN: ICD-10-PCS | Mod: S$GLB,,, | Performed by: NURSE PRACTITIONER

## 2023-02-27 PROCEDURE — 99999 PR PBB SHADOW E&M-EST. PATIENT-LVL IV: ICD-10-PCS | Mod: PBBFAC,,, | Performed by: NURSE PRACTITIONER

## 2023-02-27 PROCEDURE — 1159F PR MEDICATION LIST DOCUMENTED IN MEDICAL RECORD: ICD-10-PCS | Mod: CPTII,S$GLB,, | Performed by: NURSE PRACTITIONER

## 2023-02-27 PROCEDURE — 99417 PROLNG OP E/M EACH 15 MIN: CPT | Mod: S$GLB,,, | Performed by: NURSE PRACTITIONER

## 2023-02-27 PROCEDURE — 3008F PR BODY MASS INDEX (BMI) DOCUMENTED: ICD-10-PCS | Mod: CPTII,S$GLB,, | Performed by: NURSE PRACTITIONER

## 2023-02-27 PROCEDURE — 3008F BODY MASS INDEX DOCD: CPT | Mod: CPTII,S$GLB,, | Performed by: NURSE PRACTITIONER

## 2023-02-27 PROCEDURE — 99215 OFFICE O/P EST HI 40 MIN: CPT | Mod: S$GLB,,, | Performed by: NURSE PRACTITIONER

## 2023-02-27 PROCEDURE — 3074F PR MOST RECENT SYSTOLIC BLOOD PRESSURE < 130 MM HG: ICD-10-PCS | Mod: CPTII,S$GLB,, | Performed by: NURSE PRACTITIONER

## 2023-02-27 PROCEDURE — 99417 PR PROLONGED SVC, OUTPT, W/WO DIRECT PT CONTACT,  EA ADDTL 15 MIN: ICD-10-PCS | Mod: S$GLB,,, | Performed by: NURSE PRACTITIONER

## 2023-02-27 PROCEDURE — 99999 PR PBB SHADOW E&M-EST. PATIENT-LVL IV: CPT | Mod: PBBFAC,,, | Performed by: NURSE PRACTITIONER

## 2023-02-27 PROCEDURE — 3078F PR MOST RECENT DIASTOLIC BLOOD PRESSURE < 80 MM HG: ICD-10-PCS | Mod: CPTII,S$GLB,, | Performed by: NURSE PRACTITIONER

## 2023-02-27 PROCEDURE — 4010F ACE/ARB THERAPY RXD/TAKEN: CPT | Mod: CPTII,S$GLB,, | Performed by: NURSE PRACTITIONER

## 2023-02-27 PROCEDURE — 3074F SYST BP LT 130 MM HG: CPT | Mod: CPTII,S$GLB,, | Performed by: NURSE PRACTITIONER

## 2023-02-27 PROCEDURE — 3078F DIAST BP <80 MM HG: CPT | Mod: CPTII,S$GLB,, | Performed by: NURSE PRACTITIONER
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
## 2023-02-28 ENCOUNTER — TELEPHONE (OUTPATIENT)
Dept: INFUSION THERAPY | Facility: HOSPITAL | Age: 48
End: 2023-02-28
Payer: MEDICARE

## 2023-02-28 ENCOUNTER — TELEPHONE (OUTPATIENT)
Dept: PHARMACY | Facility: CLINIC | Age: 48
End: 2023-02-28
Payer: MEDICARE

## 2023-03-06 ENCOUNTER — CLINICAL SUPPORT (OUTPATIENT)
Dept: REHABILITATION | Facility: HOSPITAL | Age: 48
End: 2023-03-06
Payer: MEDICARE

## 2023-03-06 ENCOUNTER — PATIENT MESSAGE (OUTPATIENT)
Dept: HEMATOLOGY/ONCOLOGY | Facility: CLINIC | Age: 48
End: 2023-03-06
Payer: MEDICARE

## 2023-03-06 DIAGNOSIS — I89.0 LYMPHEDEMA OF RIGHT ARM: Primary | ICD-10-CM

## 2023-03-06 DIAGNOSIS — Z74.09 IMPAIRED FUNCTIONAL MOBILITY AND ACTIVITY TOLERANCE: ICD-10-CM

## 2023-03-06 PROCEDURE — 97110 THERAPEUTIC EXERCISES: CPT

## 2023-03-06 NOTE — PLAN OF CARE
"  Outpatient Therapy Updated Plan of Care     Visit Date: 3/6/2023  Name: Paul Li Jr.  Clinic Number: [id number]    Therapy Diagnosis:   Encounter Diagnoses   Name Primary?    Lymphedema of right arm Yes    Impaired functional mobility and activity tolerance      Physician: Rosa Linn MD    Physician Orders: PT Eval and Treat -RUE lymphedema  Medical Diagnosis: Malignant neoplasm involving both nipple and areola of right breast in male, estrogen receptor negative [C50.021, Z17.1], Lymphedema of right upper extremity   Evaluation Date: 10/10/2022    Total Visits Received: 20  Cancelled Visits: unknown  No Show Visits: unknown    Current Certification Period:  1/6/23 to 2/10/23  Precautions:  Standard, Cancer, Spine, bony mets, history of seizures, RUE lymphedema  Visits from Evaluation Date:  19      Subjective     Update:   Pt reports: He missed sessions because he was in a "mental slump." He spoke to his MD, and he is better now. He has been compliant with manual lymph drainage and wearing a sleeve that he has. Denies pain. He has not heard from [facility] about Lympha Press pump.   Pt states he tried to do a push-up the other day, and it was very difficult  He was compliant with self manual lymph drainage  Response to previous treatment: no adverse reaction  Pain Scale: Paul rates pain on a scale of 0/10 on VAS.   Pain location: N/A     Fatigue: none reported  Functional change: none stated    Objective     Update:   LANDMARK RIGHT UE LEFT UE DIFF RUE Diff RUE Diff RUE Diff RUE Diff RUE Diff RUE Diff RUE Diff RUE Diff RUE Diff RUE Diff RUE Diff RUE Diff RUE Diff   Date Eval Eval Eval 10/17/22 10/17/22 10/29/22 10/19/22 11/2/22 11/2/22 11/11/22 11/11/22 11/29/22 11/29/22 12/5/22 12/5/22 12/13/22 12/13/22 12/21/22 12/21/22 12/30/22 12/30/22 1/6/23 1/6/23 1/26/23 1/26/23 2/6/23 2/6/23 3/6/23 3/6/23   E + 8" 50 cm 46.5 cm 3.5 cm 48 cm -2 47.5 cm -0.5 50.5 cm +3 47.5 cm -3 49.5 cm +2 50.5 cm +1 48.5 cm -2 49 cm " "+0.5 48 cm -1 50 cm +2 48.5 cm -1.5 49 cm +0.5 49 cm No chg   E + 6" 45.5 cm 44 cm 1.5 cm 45 cm -0.5 46.5 cm +1.5 47 cm +0.5 45 cm -2 45.5 cm +0.5 46 cm +0.5 45 cm -1 45.5 cm +0.5 45.5 cm No chg 47 cm +1.5 45 cm -2 45.5 cm +0.5 46.5 cm +1   E + 4" 40 cm 40.5 cm 0.5 cm 40.5 cm +0.5 40.5cm No chg 41 cm +0.5 40.5 cm -0.5 40.5 cm No chg 40 cm -0.5 41.5 cm +1.5  41 cm -0.5 41 cm No chg 42.5 cm +1.5 40 cm -2.5 41.5 cm +1.5 41.5 cm No chg   E + 2" 38.5 cm 38.5 cm 0 cm 39 cm +0.5 40 cm +1 39.5 cm -0.5 38.5 cm -1 38 cm -0.5 39 cm +1 39 cm No chg 39 cm No chg 39 cm No chg 39.5 cm +0.5 38.5 cm -1 37.5 cm -1 38 cm +0.5   Elbow 36.5 cm 34 cm 2.5 cm 35.5 cm -1 35.5 cm No chg 36 cm +0.5 34.5 cm -1.5 36 cm +1.5 36 cm No chg 35 cm -1 36 cm +1 35.5 cm -0.5 37.5 cm +2 35 cm -2.5 35 cm No chg 34 cm -1   W+ 8" 37.5cm 33 cm 4.5 cm 38cm +0.5 36.5 cm -1.5 37.5 cm +1 36.5 cm -1 37.5 cm +1 37.5 cm No chg 37 cm -0.5 37 cm No chg 37 cm No chg 38 cm +1 37 cm -1 37.5 cm +0.5 36.5 cm -1   W +  6" 35 cm 29.5 cm 5.5 cm 34 cm -1 34.5 cm +0.5 33.5 cm -1 32.5 cm -1 33 cm +0.5 33 cm No chg 33cm No chg 34.5 cm +1.5 34.5 cm No chg 36 cm +1.5 33.5 cm -2.5 34.5 cm +1 34 cm -0.5   W + 4" 31.5 cm 25 cm 6.5 cm 31.5 cm No chg 31.5 cm No chg 29.5 cm -2 29 cm -0.5 30 cm +1 28 cm -2 29 cm +1 29 cm No chg 29.5 cm +0.5 32 cm +2.5 30 cm -2 30 cm No chg 29.5 cm -0.5   Wrist 23 cm 20 cm 3 cm 22.5cm -0.5 21.5 cm -1 23 cm +1.5 21 cm -2 24 cm +3 22 cm -2 22 cm No chg 22.5 cm +0.5 23 cm +0.5 23 cm No chg 22 cm -1 21 cm -1 21.5 cm +0.5   DPC 28.5 cm 26 cm 2.5 cm 28 cm -0.5 27.5cm -0.5 28.5 cm +1 27 cm -1.5 28 cm +1 27 cm -1 26.5 cm -0.5 28 cm +1.5 27.5 cm -0.5 27 cm -0.5 28 cm +1 25.5 cm -2.5 26 cm +0.5   IP Thumb 8.5 cm 8 cm 0.5 cm 8.5 cm No chg 8.5 cm No chg 9 cm +0.5 8.5 cm -0.5 8.5 cm No chg 9 cm +0.5 8.5 cm -0.5 8.5 cm No chg 8.5 cm No chg 9 cm +0.5 9 cm No chg 8.5 cm -0.5 9 cm +0.5           Assessment     Update:   Patient tolerated session well. Despite increased " time since last session, he demo's mix of increases and decreases in his right arm circumference.  He has been compliant with exercise, elevation, self manual lymph drainage, and wearing compression bandages/tubigrip, yet lymphedema persists. He tolerated complete decongestive therapy well. Once his measurements stabilize, he will benefit from a compression sleeve and glove, and he will also benefit from a compression pump to manage his symptoms. Pt needs some guidance for safely resuming exercises. He has met goals as noted below and will benefit from continued therapy to work towards remaining goals.    Previous Short Term Goals Status:   met partially  New Short Term Goals Status:   N/A  Long Term Goal Status:   continue per initial plan of care.  Reasons for Recertification of Therapy:   pt with increased time since last session and presents with mix of increases and decreases in RUE circumference measurements. He will benefit from continued therapy to address remaining goals, reduce his right arm circumference, and get him fitted for a compression sleeve and glove. He will benefit from a compression pump to manage his lymphedema at home    Plan     Updated Certification Period: 3/6/2023 to 4/7/23  Recommended Treatment Plan: 2 times per week for 4 weeks: Manual Therapy, Neuromuscular Re-ed, Orthotic Management and Training, Patient Education, Self Care, Therapeutic Activities, and Therapeutic Exercise  Other Recommendations: none anticipated    Katelynn Harrell, PT  3/6/2023      I CERTIFY THE NEED FOR THESE SERVICES FURNISHED UNDER THIS PLAN OF TREATMENT AND WHILE UNDER MY CARE    Physician's comments:        Physician's Signature: ___________________________________________________

## 2023-03-06 NOTE — PROGRESS NOTES
"                                                    Physical Therapy Daily Treatment Note       Date: 3/6/2023   Name: Paul Li Jr.  Clinic Number: [id number]    Therapy Diagnosis:   Encounter Diagnoses   Name Primary?    Lymphedema of right arm Yes    Impaired functional mobility and activity tolerance      Physician: Rosa Linn MD    Physician Orders: PT Eval and Treat -RUE lymphedema  Medical Diagnosis: Malignant neoplasm involving both nipple and areola of right breast in male, estrogen receptor negative [C50.021, Z17.1], Lymphedema of right upper extremity   Evaluation Date: 10/10/2022  Authorization Period Expiration: 3/30/23  Updated Plan of Care Certification Period: 4/7/23  Visit # / Visits authorized: 20 (7/12 newly authorized)   Insurance: [employer]  FOTO: 2/3     Time In: 8:09 AM (late arrival)  Time Out: 9:15 AM  Total Billable Time: 66 minutes     Precautions: Standard, Fall, cancer, and Spine, Bony Mets, hx of Seizures, L chest wall port      Subjective     Pt reports: He missed sessions because he was in a "mental slump." He spoke to his MD, and he is better now. He has been compliant with manual lymph drainage and wearing a sleeve that he has. Denies pain. He has not heard from [facility] about Lympha Press pump.   Pt states he tried to do a push-up the other day, and it was very difficult  He was compliant with self manual lymph drainage  Response to previous treatment: no adverse reaction  Pain Scale: Paul rates pain on a scale of 0/10 on VAS.   Pain location: N/A    Fatigue: none reported  Functional change: none stated  Treatment:   Chemotherapy: te-adjuvant chemo therapy completed 10/19  Pt is currently on chemotherapy: pt is getting 1 infusion/week for 3 weeks with 1 week break, and he takes oral chemotherapy daily.  Radiation: Completed in February 2020  Surgery date: 12/17/19 pt underwent right axillary lymph node dissection and resection excess lateral tissue; " "11/22/19 right simple mastectomy with SLNB; total of 18 lymph nodes removed      Objective     Objective Measures updated at progress report unless specified.     LANDMARK RIGHT UE LEFT UE DIFF RUE Diff RUE Diff RUE Diff RUE Diff RUE Diff RUE Diff RUE Diff RUE Diff RUE Diff RUE Diff RUE Diff RUE Diff RUE Diff   Date Eval Eval Eval 10/17/22 10/17/22 10/29/22 10/19/22 11/2/22 11/2/22 11/11/22 11/11/22 11/29/22 11/29/22 12/5/22 12/5/22 12/13/22 12/13/22 12/21/22 12/21/22 12/30/22 12/30/22 1/6/23 1/6/23 1/26/23 1/26/23 2/6/23 2/6/23 3/6/23 3/6/23   E + 8" 50 cm 46.5 cm 3.5 cm 48 cm -2 47.5 cm -0.5 50.5 cm +3 47.5 cm -3 49.5 cm +2 50.5 cm +1 48.5 cm -2 49 cm +0.5 48 cm -1 50 cm +2 48.5 cm -1.5 49 cm +0.5 49 cm No chg   E + 6" 45.5 cm 44 cm 1.5 cm 45 cm -0.5 46.5 cm +1.5 47 cm +0.5 45 cm -2 45.5 cm +0.5 46 cm +0.5 45 cm -1 45.5 cm +0.5 45.5 cm No chg 47 cm +1.5 45 cm -2 45.5 cm +0.5 46.5 cm +1   E + 4" 40 cm 40.5 cm 0.5 cm 40.5 cm +0.5 40.5cm No chg 41 cm +0.5 40.5 cm -0.5 40.5 cm No chg 40 cm -0.5 41.5 cm +1.5  41 cm -0.5 41 cm No chg 42.5 cm +1.5 40 cm -2.5 41.5 cm +1.5 41.5 cm No chg   E + 2" 38.5 cm 38.5 cm 0 cm 39 cm +0.5 40 cm +1 39.5 cm -0.5 38.5 cm -1 38 cm -0.5 39 cm +1 39 cm No chg 39 cm No chg 39 cm No chg 39.5 cm +0.5 38.5 cm -1 37.5 cm -1 38 cm +0.5   Elbow 36.5 cm 34 cm 2.5 cm 35.5 cm -1 35.5 cm No chg 36 cm +0.5 34.5 cm -1.5 36 cm +1.5 36 cm No chg 35 cm -1 36 cm +1 35.5 cm -0.5 37.5 cm +2 35 cm -2.5 35 cm No chg 34 cm -1   W+ 8" 37.5cm 33 cm 4.5 cm 38cm +0.5 36.5 cm -1.5 37.5 cm +1 36.5 cm -1 37.5 cm +1 37.5 cm No chg 37 cm -0.5 37 cm No chg 37 cm No chg 38 cm +1 37 cm -1 37.5 cm +0.5 36.5 cm -1   W +  6" 35 cm 29.5 cm 5.5 cm 34 cm -1 34.5 cm +0.5 33.5 cm -1 32.5 cm -1 33 cm +0.5 33 cm No chg 33cm No chg 34.5 cm +1.5 34.5 cm No chg 36 cm +1.5 33.5 cm -2.5 34.5 cm +1 34 cm -0.5   W + 4" 31.5 cm 25 cm 6.5 cm 31.5 cm No chg 31.5 cm No chg 29.5 cm -2 29 cm -0.5 30 cm +1 28 cm -2 29 cm +1 29 cm No chg 29.5 cm " +0.5 32 cm +2.5 30 cm -2 30 cm No chg 29.5 cm -0.5   Wrist 23 cm 20 cm 3 cm 22.5cm -0.5 21.5 cm -1 23 cm +1.5 21 cm -2 24 cm +3 22 cm -2 22 cm No chg 22.5 cm +0.5 23 cm +0.5 23 cm No chg 22 cm -1 21 cm -1 21.5 cm +0.5   DPC 28.5 cm 26 cm 2.5 cm 28 cm -0.5 27.5cm -0.5 28.5 cm +1 27 cm -1.5 28 cm +1 27 cm -1 26.5 cm -0.5 28 cm +1.5 27.5 cm -0.5 27 cm -0.5 28 cm +1 25.5 cm -2.5 26 cm +0.5   IP Thumb 8.5 cm 8 cm 0.5 cm 8.5 cm No chg 8.5 cm No chg 9 cm +0.5 8.5 cm -0.5 8.5 cm No chg 9 cm +0.5 8.5 cm -0.5 8.5 cm No chg 8.5 cm No chg 9 cm +0.5 9 cm No chg 8.5 cm -0.5 9 cm +0.5            Treatment     Paul received individual therapeutic exercises to improve postural correction and alignment, stretching and soft tissue mobility, and strengthening for 15 minutes including the following:     Diaphragmatic breathing, 2 x 5 reps  Scapular retractions  10 reps  Shoulder rolls, 10 reps each fwd and back  Head turns x 5 reps B  Head tilts x 5 reps B      PT provides guidance on precautions/considerations with push-ups and his lymphedema and spine mets. PT advised trying rows with band and chest press with band or light weights first. Pt provided with red theraband. Also discussed that pending his exercise tolerance he may be able to progress to wall push-ups.        Paul received the following manual therapy techniques were performed to increased myofascial/soft tissue length, mobility and pliability, increase PROM, AROM and function as well as to decrease pain for 51 minutes    Measurements taken above    Short Neck- held due to history of seizures  Clear Healthy Lymph Nodes:  Left Axilla                                                  Right Groin  Open Anastomoses:  Anterior Axillo-Axillary  (AAA)                                    Axillo-Inguinal     (AI)                                    Posterior Axillo-Axillary  (GEREMIAS)     Right Upper Arm (Lateral and Medial)  Right  Antecubital Fossa  Right Dorsal Forearm  Right Volar  Forearm  Dorsum Right Hand  Right fingers     Rework Right UE  Rework Anastomoses  Rework Healthy lymph Nodes      PT applies gauze to right fingers and hand, stockinette, cotton artiflex, and short stretch bandages to pt's RIGHT/LEFT/BILATERAL: right upper extremity. PT places size H tubigrip over upper arm to assist in keeping bandages up. Pt educated to wear bandaging until next session unless it causes pain, numbness, or changes in skin color/temperature, or if they start to fall down.  If removed, pt instructed to roll up bandages and cotton padding and bring to next session. If bandages are removed, pt can wear his tubigrip.  Pt has Vet glove to cover bandages in the shower, and we reviewed directions on how to care for bandages. Pt verbalizes understanding of all topics.                Home Exercise Program and Patient Education   Education provided re:  - role of PT in multi - disciplinary team, goals for PT  - progress towards goals         Written Home Exercises Provided: Patient instructed to cont prior HEP.  Exercises were reviewed and Paul was able to demonstrate them prior to the end of the session.  Paul demonstrated good  understanding of the education provided.     See EMR under Media for self lymphatic drainage provided prior visit.    Pt has no cultural, educational or language barriers to learning provided.    Assessment     Patient tolerated session well. Despite increased time since last session, he demo's mix of increases and decreases in his right arm circumference.  He has been compliant with exercise, elevation, self manual lymph drainage, and wearing compression bandages/tubigrip, yet lymphedema persists. He tolerated complete decongestive therapy well. Once his measurements stabilize, he will benefit from a compression sleeve and glove, and he will also benefit from a compression pump to manage his symptoms. Pt needs some guidance for safely resuming exercises. He has met goals as noted  below and will benefit from continued therapy to work towards remaining goals.      Pt prognosis is Good. Pt will continue to benefit from skilled outpatient physical therapy to address the deficits listed in the problem list chart on initial evaluation, provide pt/family education and to maximize pt's level of independence in the home and community environment.     Anticipated barriers to physical therapy: advanced disease    Goals as follows:  Short Term Goals (6 weeks)  1. Pt will demo decrease in girth in right upper extremity by >/= 2cm to allow for improved UE symmetry, clothing choice, ROM, and UE function. (Met, 1/26/23  2. Patient will demonstrate 100% knowledge of lymphedema precautions and signs of infection to allow for reduced risk of lymphedema, reduced risk of infection, and/or exacerbation.  (met)  3. Patient will perform self lymph drainage techniques to enhance lymphatic drainage and mobility.  ( met)  4. Patient will tolerate daily activities with multilayered bandaging to enhance lymphatic drainage and skin elasticity.  (met)  5. Pt will tolerate HEP for improved strength, functional mobility, ROM, posture, and endurance. (met)        Long Term Goals: ( 12  weeks)  1. Patient to show decreased girth in right upper extremity by >/= 3cm to allow for improved UE symmetry, clothing choice, ROM, and UE function.  (progressing, not met)  2. Patient will show reduction in density to mild or less with improved contour of limb to allow for improved cosmesis, improved lymphatic drainage, and functional mobility.  (progressing, not met)  3. Patient to bruna/doff compression garment with daily compliance to support lymphatic mobility and skin elasticity.  (progressing, not met)  4. Pt to show improved postural awareness and alignment for improved functional mobility.  (progressing, not met)  5. Pt to be independent and compliant with HEP to allow for improved ROM, reach and carry with functional endurance and  strength.    (progressing, not met)           Plan   Outpatient physical therapy 2x week for 4 weeks to include the following:   Manual Therapy, Neuromuscular Re-ed, Orthotic Management and Training, Patient Education, Self Care, Therapeutic Activities, and Therapeutic Exercise.     Plan of care Certification Period: 3/6/23 to 4/7/23       Therapist: Katelynn Harrell, PT  3/6/2023

## 2023-03-09 ENCOUNTER — PATIENT MESSAGE (OUTPATIENT)
Dept: REHABILITATION | Facility: HOSPITAL | Age: 48
End: 2023-03-09
Payer: MEDICARE

## 2023-03-10 ENCOUNTER — LAB VISIT (OUTPATIENT)
Dept: LAB | Facility: HOSPITAL | Age: 48
End: 2023-03-10
Attending: INTERNAL MEDICINE
Payer: MEDICARE

## 2023-03-10 ENCOUNTER — SPECIALTY PHARMACY (OUTPATIENT)
Dept: PHARMACY | Facility: CLINIC | Age: 48
End: 2023-03-10
Payer: MEDICARE

## 2023-03-10 ENCOUNTER — INFUSION (OUTPATIENT)
Dept: INFUSION THERAPY | Facility: HOSPITAL | Age: 48
End: 2023-03-10
Attending: INTERNAL MEDICINE
Payer: MEDICARE

## 2023-03-10 VITALS
DIASTOLIC BLOOD PRESSURE: 73 MMHG | TEMPERATURE: 98 F | HEIGHT: 73 IN | BODY MASS INDEX: 41.75 KG/M2 | HEART RATE: 71 BPM | WEIGHT: 315 LBS | SYSTOLIC BLOOD PRESSURE: 123 MMHG | RESPIRATION RATE: 18 BRPM

## 2023-03-10 DIAGNOSIS — G89.3 NEOPLASM RELATED PAIN: ICD-10-CM

## 2023-03-10 DIAGNOSIS — G47.00 INSOMNIA, UNSPECIFIED TYPE: ICD-10-CM

## 2023-03-10 DIAGNOSIS — Z17.1 MALIGNANT NEOPLASM INVOLVING BOTH NIPPLE AND AREOLA OF RIGHT BREAST IN MALE, ESTROGEN RECEPTOR NEGATIVE: ICD-10-CM

## 2023-03-10 DIAGNOSIS — C50.021 MALIGNANT NEOPLASM INVOLVING BOTH NIPPLE AND AREOLA OF RIGHT BREAST IN MALE, ESTROGEN RECEPTOR NEGATIVE: Primary | ICD-10-CM

## 2023-03-10 DIAGNOSIS — Z17.1 MALIGNANT NEOPLASM INVOLVING BOTH NIPPLE AND AREOLA OF RIGHT BREAST IN MALE, ESTROGEN RECEPTOR NEGATIVE: Primary | ICD-10-CM

## 2023-03-10 DIAGNOSIS — C50.021 MALIGNANT NEOPLASM INVOLVING BOTH NIPPLE AND AREOLA OF RIGHT BREAST IN MALE, ESTROGEN RECEPTOR NEGATIVE: ICD-10-CM

## 2023-03-10 DIAGNOSIS — C50.919 BREAST CANCER: ICD-10-CM

## 2023-03-10 LAB
ALBUMIN SERPL BCP-MCNC: 3.6 G/DL (ref 3.5–5.2)
ALP SERPL-CCNC: 102 U/L (ref 55–135)
ALT SERPL W/O P-5'-P-CCNC: 17 U/L (ref 10–44)
ANION GAP SERPL CALC-SCNC: 7 MMOL/L (ref 8–16)
AST SERPL-CCNC: 21 U/L (ref 10–40)
BASOPHILS # BLD AUTO: 0.01 K/UL (ref 0–0.2)
BASOPHILS NFR BLD: 0.2 % (ref 0–1.9)
BILIRUB SERPL-MCNC: 0.4 MG/DL (ref 0.1–1)
BUN SERPL-MCNC: 15 MG/DL (ref 6–20)
CALCIUM SERPL-MCNC: 9.2 MG/DL (ref 8.7–10.5)
CHLORIDE SERPL-SCNC: 107 MMOL/L (ref 95–110)
CO2 SERPL-SCNC: 23 MMOL/L (ref 23–29)
CREAT SERPL-MCNC: 1.2 MG/DL (ref 0.5–1.4)
DIFFERENTIAL METHOD: ABNORMAL
EOSINOPHIL # BLD AUTO: 0 K/UL (ref 0–0.5)
EOSINOPHIL NFR BLD: 0.6 % (ref 0–8)
ERYTHROCYTE [DISTWIDTH] IN BLOOD BY AUTOMATED COUNT: 17.8 % (ref 11.5–14.5)
EST. GFR  (NO RACE VARIABLE): >60 ML/MIN/1.73 M^2
GLUCOSE SERPL-MCNC: 144 MG/DL (ref 70–110)
HCT VFR BLD AUTO: 35.8 % (ref 40–54)
HGB BLD-MCNC: 10.8 G/DL (ref 14–18)
IMM GRANULOCYTES # BLD AUTO: 0.01 K/UL (ref 0–0.04)
IMM GRANULOCYTES NFR BLD AUTO: 0.2 % (ref 0–0.5)
LYMPHOCYTES # BLD AUTO: 1 K/UL (ref 1–4.8)
LYMPHOCYTES NFR BLD: 20.4 % (ref 18–48)
MCH RBC QN AUTO: 24.8 PG (ref 27–31)
MCHC RBC AUTO-ENTMCNC: 30.2 G/DL (ref 32–36)
MCV RBC AUTO: 82 FL (ref 82–98)
MONOCYTES # BLD AUTO: 0.6 K/UL (ref 0.3–1)
MONOCYTES NFR BLD: 11.8 % (ref 4–15)
NEUTROPHILS # BLD AUTO: 3.3 K/UL (ref 1.8–7.7)
NEUTROPHILS NFR BLD: 66.8 % (ref 38–73)
NRBC BLD-RTO: 0 /100 WBC
PLATELET # BLD AUTO: 231 K/UL (ref 150–450)
PMV BLD AUTO: 10.3 FL (ref 9.2–12.9)
POTASSIUM SERPL-SCNC: 3.8 MMOL/L (ref 3.5–5.1)
PROT SERPL-MCNC: 7.2 G/DL (ref 6–8.4)
RBC # BLD AUTO: 4.36 M/UL (ref 4.6–6.2)
SODIUM SERPL-SCNC: 137 MMOL/L (ref 136–145)
WBC # BLD AUTO: 5 K/UL (ref 3.9–12.7)

## 2023-03-10 PROCEDURE — 63600175 PHARM REV CODE 636 W HCPCS: Mod: JW,JG | Performed by: NURSE PRACTITIONER

## 2023-03-10 PROCEDURE — 25000003 PHARM REV CODE 250: Performed by: NURSE PRACTITIONER

## 2023-03-10 PROCEDURE — A4216 STERILE WATER/SALINE, 10 ML: HCPCS | Performed by: NURSE PRACTITIONER

## 2023-03-10 PROCEDURE — 36415 COLL VENOUS BLD VENIPUNCTURE: CPT | Performed by: INTERNAL MEDICINE

## 2023-03-10 PROCEDURE — 96361 HYDRATE IV INFUSION ADD-ON: CPT

## 2023-03-10 PROCEDURE — 85025 COMPLETE CBC W/AUTO DIFF WBC: CPT | Performed by: INTERNAL MEDICINE

## 2023-03-10 PROCEDURE — 80053 COMPREHEN METABOLIC PANEL: CPT | Performed by: INTERNAL MEDICINE

## 2023-03-10 PROCEDURE — 96413 CHEMO IV INFUSION 1 HR: CPT

## 2023-03-10 RX ORDER — SODIUM CHLORIDE 0.9 % (FLUSH) 0.9 %
10 SYRINGE (ML) INJECTION
Status: DISCONTINUED | OUTPATIENT
Start: 2023-03-10 | End: 2023-03-10 | Stop reason: HOSPADM

## 2023-03-10 RX ORDER — SODIUM CHLORIDE 9 MG/ML
INJECTION, SOLUTION INTRAVENOUS CONTINUOUS
Status: DISCONTINUED | OUTPATIENT
Start: 2023-03-10 | End: 2023-03-10 | Stop reason: HOSPADM

## 2023-03-10 RX ORDER — HEPARIN 100 UNIT/ML
500 SYRINGE INTRAVENOUS
Status: DISCONTINUED | OUTPATIENT
Start: 2023-03-10 | End: 2023-03-10 | Stop reason: HOSPADM

## 2023-03-10 RX ORDER — LIDOCAINE 50 MG/G
1 PATCH TOPICAL DAILY
Qty: 7 PATCH | Refills: 0 | Status: SHIPPED | OUTPATIENT
Start: 2023-03-10 | End: 2023-03-29 | Stop reason: SDUPTHER

## 2023-03-10 RX ORDER — TRAZODONE HYDROCHLORIDE 100 MG/1
100 TABLET ORAL NIGHTLY
Qty: 30 TABLET | Refills: 0 | Status: SHIPPED | OUTPATIENT
Start: 2023-03-10 | End: 2023-04-06 | Stop reason: SDUPTHER

## 2023-03-10 RX ADMIN — SODIUM CHLORIDE: 0.9 INJECTION, SOLUTION INTRAVENOUS at 09:03

## 2023-03-10 RX ADMIN — HEPARIN 500 UNITS: 100 SYRINGE at 11:03

## 2023-03-10 RX ADMIN — PACLITAXEL 220 MG: 100 INJECTION, POWDER, LYOPHILIZED, FOR SUSPENSION INTRAVENOUS at 10:03

## 2023-03-10 RX ADMIN — Medication 10 ML: at 11:03

## 2023-03-10 NOTE — TELEPHONE ENCOUNTER
Specialty Pharmacy - Refill Coordination    Specialty Medication Orders Linked to Encounter      Flowsheet Row Most Recent Value   Medication #1 alpelisib (PIQRAY) 300 mg/day (150 mg x 2) Tab (Order#[id number], Rx#[id number])            Refill Questions - Documented Responses      Flowsheet Row Most Recent Value   Patient Availability and HIPAA Verification    Does patient want to proceed with activity? Yes   HIPAA/medical authority confirmed? Yes   Relationship to patient of person spoken to? Self   Refill Screening Questions    Changes to allergies? No   Changes to medications? No   New conditions since last clinic visit? No   Unplanned office visit, urgent care, ED, or hospital admission in the last 4 weeks? No   How does patient/caregiver feel medication is working? Good   Financial problems or insurance changes? No   How many doses of your specialty medications were missed in the last 4 weeks? 0   Would patient like to speak to a pharmacist? No   When does the patient need to receive the medication? 03/15/23   Refill Delivery Questions    How will the patient receive the medication? [facility]   When does the patient need to receive the medication? 03/15/23   Shipping Address Home   Address in [facility] confirmed and updated if neccessary? Yes   Expected Copay ($) 0   Is the patient able to afford the medication copay? Yes   Payment Method zero copay   Days supply of Refill 28   Supplies needed? No supplies needed   Refill activity completed? Yes   Refill activity plan Refill scheduled   Shipment/Pickup Date: 03/10/23            Current Outpatient Medications   Medication Sig    alpelisib (PIQRAY) 300 mg/day (150 mg x 2) Tab Take 2 tablets (300 mg) by mouth once daily.    ALPRAZolam (XANAX) 0.5 MG tablet Take 1 tablet (0.5 mg total) by mouth 3 (three) times daily as needed for Insomnia or Anxiety.    amLODIPine (NORVASC) 10 MG tablet TAKE 1 TABLET (10 MG) BY MOUTH EVERY DAY    calcium-vitamin D3 (OYSTER  "SHELL CALCIUM-VIT D3) 500 mg-5 mcg (200 unit) per tablet Take 1 tablet by mouth 2 (two) times daily.    diazePAM (VALIUM) 5 MG tablet Take 1 tablet (5 mg total) by mouth every 12 (twelve) hours as needed (muscle spasm).    diphenoxylate-atropine 2.5-0.025 mg (LOMOTIL) 2.5-0.025 mg per tablet Take 1 tablet by mouth 4 (four) times daily as needed for Diarrhea.    dulaglutide (TRULICITY) 1.5 mg/0.5 mL pen injector Inject 1.5 mg into the skin once a week.    FLUoxetine 20 MG capsule Take 2 capsules (40 mg total) by mouth once daily.    gabapentin (NEURONTIN) 300 MG capsule Take 1 capsule (300 mg total) by mouth 3 (three) times daily.    hydroCHLOROthiazide (HYDRODIURIL) 25 MG tablet TAKE 1 TABLET BY MOUTH ONCE DAILY    HYDROcodone-acetaminophen (NORCO)  mg per tablet Take 1 tablet by mouth every 6 (six) hours as needed for Pain.    ibuprofen (ADVIL,MOTRIN) 600 MG tablet Take 1 tablet (600 mg total) by mouth every 8 (eight) hours as needed for Pain.    insulin detemir U-100 (LEVEMIR FLEXTOUCH U-100 INSULN) 100 unit/mL (3 mL) InPn pen Inject 21 Units into the skin every evening.    insulin lispro (HUMALOG KWIKPEN INSULIN) 100 unit/mL pen Inject 5 Units into the skin 3 (three) times daily.    lancets 33 gauge Misc 1 lancet by Misc.(Non-Drug; Combo Route) route once daily.    lancing device Misc 1 Device by Misc.(Non-Drug; Combo Route) route 2 (two) times daily with meals.    LIDOcaine (LIDODERM) 5 % Place 1 patch onto the skin once daily. Remove & Discard patch within 12 hours or as directed by MD    losartan (COZAAR) 50 MG tablet Take 2 tablets by mouth once daily    metFORMIN (GLUCOPHAGE) 500 MG tablet Take 2 tablets (1,000 mg total) by mouth 2 (two) times daily with meals. Needs appointment for future refills    pen needle, diabetic (BD ULTRA-FINE TANESHA PEN NEEDLE) 32 gauge x 5/32" Ndle Use as directed with novolog and levemir    tadalafiL (CIALIS) 5 MG tablet Take 2 tablets (10 mg total) by mouth daily as needed " for Erectile Dysfunction.    traZODone (DESYREL) 100 MG tablet Take 1 tablet (100 mg total) by mouth every evening.   Last reviewed on 2/27/2023  9:54 AM by Rene Marc MA    Review of patient's allergies indicates:  No Known Allergies Last reviewed on  2/27/2023 2:52 PM by Lynn Adorno      Tasks added this encounter   4/5/2023 - Refill Call (Auto Added)   Tasks due within next 3 months   4/3/2023 - Clinical - Follow Up Assesement (180 day)     Mili Rosales [facility]  [address] [facility] [address]  Phone: [phone]  Fax: [phone]

## 2023-03-10 NOTE — TELEPHONE ENCOUNTER
Specialty Pharmacy - Refill Coordination    Specialty Medication Orders Linked to Encounter      Flowsheet Row Most Recent Value   Medication #1 alpelisib (PIQRAY) 300 mg/day (150 mg x 2) Tab (Order#[id number], Rx#[id number])            Refill Questions - Documented Responses      Flowsheet Row Most Recent Value   Patient Availability and HIPAA Verification    Does patient want to proceed with activity? Yes   HIPAA/medical authority confirmed? Yes   Relationship to patient of person spoken to? Self   Refill Screening Questions    Changes to allergies? No   Changes to medications? No   New conditions since last clinic visit? No   Unplanned office visit, urgent care, ED, or hospital admission in the last 4 weeks? No   How does patient/caregiver feel medication is working? Good   Financial problems or insurance changes? No   How many doses of your specialty medications were missed in the last 4 weeks? 0   Would patient like to speak to a pharmacist? No   When does the patient need to receive the medication? 03/15/23   Refill Delivery Questions    How will the patient receive the medication? [facility]   When does the patient need to receive the medication? 03/15/23   Shipping Address Prescription   Address in [facility] confirmed and updated if neccessary? Yes   Expected Copay ($) 0   Is the patient able to afford the medication copay? Yes   Payment Method zero copay   Days supply of Refill 28   Supplies needed? No supplies needed   Refill activity completed? Yes   Refill activity plan Refill scheduled   Shipment/Pickup Date: 03/10/23            Current Outpatient Medications   Medication Sig    alpelisib (PIQRAY) 300 mg/day (150 mg x 2) Tab Take 2 tablets (300 mg) by mouth once daily.    ALPRAZolam (XANAX) 0.5 MG tablet Take 1 tablet (0.5 mg total) by mouth 3 (three) times daily as needed for Insomnia or Anxiety.    amLODIPine (NORVASC) 10 MG tablet TAKE 1 TABLET (10 MG) BY MOUTH EVERY DAY    calcium-vitamin D3  "(OYSTER SHELL CALCIUM-VIT D3) 500 mg-5 mcg (200 unit) per tablet Take 1 tablet by mouth 2 (two) times daily.    diazePAM (VALIUM) 5 MG tablet Take 1 tablet (5 mg total) by mouth every 12 (twelve) hours as needed (muscle spasm).    diphenoxylate-atropine 2.5-0.025 mg (LOMOTIL) 2.5-0.025 mg per tablet Take 1 tablet by mouth 4 (four) times daily as needed for Diarrhea.    dulaglutide (TRULICITY) 1.5 mg/0.5 mL pen injector Inject 1.5 mg into the skin once a week.    FLUoxetine 20 MG capsule Take 2 capsules (40 mg total) by mouth once daily.    gabapentin (NEURONTIN) 300 MG capsule Take 1 capsule (300 mg total) by mouth 3 (three) times daily.    hydroCHLOROthiazide (HYDRODIURIL) 25 MG tablet TAKE 1 TABLET BY MOUTH ONCE DAILY    HYDROcodone-acetaminophen (NORCO)  mg per tablet Take 1 tablet by mouth every 6 (six) hours as needed for Pain.    ibuprofen (ADVIL,MOTRIN) 600 MG tablet Take 1 tablet (600 mg total) by mouth every 8 (eight) hours as needed for Pain.    insulin detemir U-100 (LEVEMIR FLEXTOUCH U-100 INSULN) 100 unit/mL (3 mL) InPn pen Inject 21 Units into the skin every evening.    insulin lispro (HUMALOG KWIKPEN INSULIN) 100 unit/mL pen Inject 5 Units into the skin 3 (three) times daily.    lancets 33 gauge Misc 1 lancet by Misc.(Non-Drug; Combo Route) route once daily.    lancing device Misc 1 Device by Misc.(Non-Drug; Combo Route) route 2 (two) times daily with meals.    LIDOcaine (LIDODERM) 5 % Place 1 patch onto the skin once daily. Remove & Discard patch within 12 hours or as directed by MD    losartan (COZAAR) 50 MG tablet Take 2 tablets by mouth once daily    metFORMIN (GLUCOPHAGE) 500 MG tablet Take 2 tablets (1,000 mg total) by mouth 2 (two) times daily with meals. Needs appointment for future refills    pen needle, diabetic (BD ULTRA-FINE TANESHA PEN NEEDLE) 32 gauge x 5/32" Ndle Use as directed with novolog and levemir    tadalafiL (CIALIS) 5 MG tablet Take 2 tablets (10 mg total) by mouth daily as " needed for Erectile Dysfunction.    traZODone (DESYREL) 100 MG tablet Take 1 tablet (100 mg total) by mouth every evening.   Last reviewed on 2/27/2023  9:54 AM by Rene Marc MA    Review of patient's allergies indicates:  No Known Allergies Last reviewed on  2/27/2023 2:52 PM by Lynn Adorno      Tasks added this encounter   4/5/2023 - Refill Call (Auto Added)   Tasks due within next 3 months   4/3/2023 - Clinical - Follow Up Assesement (180 day)     [facility]  [address] [facility] [address]  Phone: [phone]  Fax: [phone]

## 2023-03-13 ENCOUNTER — PATIENT MESSAGE (OUTPATIENT)
Dept: REHABILITATION | Facility: HOSPITAL | Age: 48
End: 2023-03-13
Payer: MEDICARE

## 2023-03-14 ENCOUNTER — PATIENT MESSAGE (OUTPATIENT)
Dept: PSYCHIATRY | Facility: CLINIC | Age: 48
End: 2023-03-14
Payer: MEDICARE

## 2023-03-20 ENCOUNTER — HOSPITAL ENCOUNTER (OUTPATIENT)
Dept: RADIOLOGY | Facility: HOSPITAL | Age: 48
Discharge: HOME | End: 2023-03-20
Attending: NURSE PRACTITIONER
Payer: MEDICARE

## 2023-03-20 ENCOUNTER — OFFICE VISIT (OUTPATIENT)
Dept: PSYCHIATRY | Facility: CLINIC | Age: 48
End: 2023-03-20
Payer: MEDICARE

## 2023-03-20 DIAGNOSIS — F43.23 ADJUSTMENT DISORDER WITH MIXED ANXIETY AND DEPRESSED MOOD: Primary | ICD-10-CM

## 2023-03-20 DIAGNOSIS — C79.51 BONE METASTASES: ICD-10-CM

## 2023-03-20 DIAGNOSIS — Z17.1 MALIGNANT NEOPLASM INVOLVING BOTH NIPPLE AND AREOLA OF RIGHT BREAST IN MALE, ESTROGEN RECEPTOR NEGATIVE: ICD-10-CM

## 2023-03-20 DIAGNOSIS — C50.021 MALIGNANT NEOPLASM INVOLVING BOTH NIPPLE AND AREOLA OF RIGHT BREAST IN MALE, ESTROGEN RECEPTOR NEGATIVE: ICD-10-CM

## 2023-03-20 LAB — POCT GLUCOSE: 82 MG/DL (ref 70–110)

## 2023-03-20 PROCEDURE — 1160F RVW MEDS BY RX/DR IN RCRD: CPT | Mod: CPTII,S$GLB,, | Performed by: PSYCHOLOGIST

## 2023-03-20 PROCEDURE — 78815 NM PET CT ROUTINE: ICD-10-PCS | Mod: 26,PS,, | Performed by: RADIOLOGY

## 2023-03-20 PROCEDURE — 78815 PET IMAGE W/CT SKULL-THIGH: CPT | Mod: 26,PS,, | Performed by: RADIOLOGY

## 2023-03-20 PROCEDURE — 90837 PR PSYCHOTHERAPY W/PATIENT, 60 MIN: ICD-10-PCS | Mod: S$GLB,,, | Performed by: PSYCHOLOGIST

## 2023-03-20 PROCEDURE — 78815 PET IMAGE W/CT SKULL-THIGH: CPT | Mod: TC,PS

## 2023-03-20 PROCEDURE — A9552 F18 FDG: HCPCS

## 2023-03-20 PROCEDURE — 4010F PR ACE/ARB THEARPY RXD/TAKEN: ICD-10-PCS | Mod: CPTII,S$GLB,, | Performed by: PSYCHOLOGIST

## 2023-03-20 PROCEDURE — 1159F PR MEDICATION LIST DOCUMENTED IN MEDICAL RECORD: ICD-10-PCS | Mod: CPTII,S$GLB,, | Performed by: PSYCHOLOGIST

## 2023-03-20 PROCEDURE — 25500020 PHARM REV CODE 255: Performed by: NURSE PRACTITIONER

## 2023-03-20 PROCEDURE — A9698 NON-RAD CONTRAST MATERIALNOC: HCPCS | Performed by: NURSE PRACTITIONER

## 2023-03-20 PROCEDURE — 1159F MED LIST DOCD IN RCRD: CPT | Mod: CPTII,S$GLB,, | Performed by: PSYCHOLOGIST

## 2023-03-20 PROCEDURE — 1160F PR REVIEW ALL MEDS BY PRESCRIBER/CLIN PHARMACIST DOCUMENTED: ICD-10-PCS | Mod: CPTII,S$GLB,, | Performed by: PSYCHOLOGIST

## 2023-03-20 PROCEDURE — 4010F ACE/ARB THERAPY RXD/TAKEN: CPT | Mod: CPTII,S$GLB,, | Performed by: PSYCHOLOGIST

## 2023-03-20 PROCEDURE — 90837 PSYTX W PT 60 MINUTES: CPT | Mod: S$GLB,,, | Performed by: PSYCHOLOGIST

## 2023-03-20 PROCEDURE — 99999 PR PBB SHADOW E&M-EST. PATIENT-LVL I: ICD-10-PCS | Mod: PBBFAC,,, | Performed by: PSYCHOLOGIST

## 2023-03-20 PROCEDURE — 99999 PR PBB SHADOW E&M-EST. PATIENT-LVL I: CPT | Mod: PBBFAC,,, | Performed by: PSYCHOLOGIST

## 2023-03-20 RX ADMIN — BARIUM SULFATE 900 ML: 20 SUSPENSION ORAL at 09:03
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
## 2023-03-21 ENCOUNTER — OFFICE VISIT (OUTPATIENT)
Dept: HEMATOLOGY/ONCOLOGY | Facility: CLINIC | Age: 48
End: 2023-03-21
Payer: MEDICARE

## 2023-03-21 ENCOUNTER — LAB VISIT (OUTPATIENT)
Dept: LAB | Facility: HOSPITAL | Age: 48
End: 2023-03-21
Attending: NURSE PRACTITIONER
Payer: MEDICARE

## 2023-03-21 VITALS
OXYGEN SATURATION: 99 % | HEIGHT: 73 IN | WEIGHT: 315 LBS | DIASTOLIC BLOOD PRESSURE: 67 MMHG | BODY MASS INDEX: 41.75 KG/M2 | RESPIRATION RATE: 17 BRPM | HEART RATE: 76 BPM | SYSTOLIC BLOOD PRESSURE: 139 MMHG | TEMPERATURE: 99 F

## 2023-03-21 DIAGNOSIS — C50.021 MALIGNANT NEOPLASM INVOLVING BOTH NIPPLE AND AREOLA OF RIGHT BREAST IN MALE, ESTROGEN RECEPTOR NEGATIVE: Primary | ICD-10-CM

## 2023-03-21 DIAGNOSIS — Z17.1 MALIGNANT NEOPLASM INVOLVING BOTH NIPPLE AND AREOLA OF RIGHT BREAST IN MALE, ESTROGEN RECEPTOR NEGATIVE: ICD-10-CM

## 2023-03-21 DIAGNOSIS — T45.1X5A ANEMIA ASSOCIATED WITH CHEMOTHERAPY: ICD-10-CM

## 2023-03-21 DIAGNOSIS — D64.81 ANEMIA ASSOCIATED WITH CHEMOTHERAPY: ICD-10-CM

## 2023-03-21 DIAGNOSIS — E66.01 CLASS 3 SEVERE OBESITY DUE TO EXCESS CALORIES WITH SERIOUS COMORBIDITY AND BODY MASS INDEX (BMI) OF 50.0 TO 59.9 IN ADULT: ICD-10-CM

## 2023-03-21 DIAGNOSIS — C50.021 MALIGNANT NEOPLASM INVOLVING BOTH NIPPLE AND AREOLA OF RIGHT BREAST IN MALE, ESTROGEN RECEPTOR NEGATIVE: ICD-10-CM

## 2023-03-21 DIAGNOSIS — C79.51 BONE METASTASES: ICD-10-CM

## 2023-03-21 DIAGNOSIS — Z17.1 MALIGNANT NEOPLASM INVOLVING BOTH NIPPLE AND AREOLA OF RIGHT BREAST IN MALE, ESTROGEN RECEPTOR NEGATIVE: Primary | ICD-10-CM

## 2023-03-21 DIAGNOSIS — G89.3 NEOPLASM RELATED PAIN: ICD-10-CM

## 2023-03-21 DIAGNOSIS — E11.9 TYPE 2 DIABETES MELLITUS WITHOUT COMPLICATION, WITHOUT LONG-TERM CURRENT USE OF INSULIN: ICD-10-CM

## 2023-03-21 LAB
ALBUMIN SERPL BCP-MCNC: 3.8 G/DL (ref 3.5–5.2)
ALP SERPL-CCNC: 95 U/L (ref 55–135)
ALT SERPL W/O P-5'-P-CCNC: 16 U/L (ref 10–44)
ANION GAP SERPL CALC-SCNC: 8 MMOL/L (ref 8–16)
AST SERPL-CCNC: 19 U/L (ref 10–40)
BILIRUB SERPL-MCNC: 0.7 MG/DL (ref 0.1–1)
BUN SERPL-MCNC: 9 MG/DL (ref 6–20)
CALCIUM SERPL-MCNC: 9 MG/DL (ref 8.7–10.5)
CHLORIDE SERPL-SCNC: 105 MMOL/L (ref 95–110)
CO2 SERPL-SCNC: 26 MMOL/L (ref 23–29)
CREAT SERPL-MCNC: 1.2 MG/DL (ref 0.5–1.4)
ERYTHROCYTE [DISTWIDTH] IN BLOOD BY AUTOMATED COUNT: 17 % (ref 11.5–14.5)
EST. GFR  (NO RACE VARIABLE): >60 ML/MIN/1.73 M^2
GLUCOSE SERPL-MCNC: 102 MG/DL (ref 70–110)
HCT VFR BLD AUTO: 35.3 % (ref 40–54)
HGB BLD-MCNC: 11 G/DL (ref 14–18)
IMM GRANULOCYTES # BLD AUTO: 0.02 K/UL (ref 0–0.04)
MCH RBC QN AUTO: 25.1 PG (ref 27–31)
MCHC RBC AUTO-ENTMCNC: 31.2 G/DL (ref 32–36)
MCV RBC AUTO: 80 FL (ref 82–98)
NEUTROPHILS # BLD AUTO: 2.2 K/UL (ref 1.8–7.7)
PLATELET # BLD AUTO: 266 K/UL (ref 150–450)
PMV BLD AUTO: 9.5 FL (ref 9.2–12.9)
POTASSIUM SERPL-SCNC: 4.2 MMOL/L (ref 3.5–5.1)
PROT SERPL-MCNC: 7.6 G/DL (ref 6–8.4)
RBC # BLD AUTO: 4.39 M/UL (ref 4.6–6.2)
SODIUM SERPL-SCNC: 139 MMOL/L (ref 136–145)
WBC # BLD AUTO: 3.96 K/UL (ref 3.9–12.7)

## 2023-03-21 PROCEDURE — 4010F ACE/ARB THERAPY RXD/TAKEN: CPT | Mod: CPTII,S$GLB,, | Performed by: INTERNAL MEDICINE

## 2023-03-21 PROCEDURE — 1159F PR MEDICATION LIST DOCUMENTED IN MEDICAL RECORD: ICD-10-PCS | Mod: CPTII,S$GLB,, | Performed by: INTERNAL MEDICINE

## 2023-03-21 PROCEDURE — 3008F PR BODY MASS INDEX (BMI) DOCUMENTED: ICD-10-PCS | Mod: CPTII,S$GLB,, | Performed by: INTERNAL MEDICINE

## 2023-03-21 PROCEDURE — 99999 PR PBB SHADOW E&M-EST. PATIENT-LVL IV: ICD-10-PCS | Mod: PBBFAC,,, | Performed by: INTERNAL MEDICINE

## 2023-03-21 PROCEDURE — 99999 PR PBB SHADOW E&M-EST. PATIENT-LVL IV: CPT | Mod: PBBFAC,,, | Performed by: INTERNAL MEDICINE

## 2023-03-21 PROCEDURE — 4010F PR ACE/ARB THEARPY RXD/TAKEN: ICD-10-PCS | Mod: CPTII,S$GLB,, | Performed by: INTERNAL MEDICINE

## 2023-03-21 PROCEDURE — 3008F BODY MASS INDEX DOCD: CPT | Mod: CPTII,S$GLB,, | Performed by: INTERNAL MEDICINE

## 2023-03-21 PROCEDURE — 36415 COLL VENOUS BLD VENIPUNCTURE: CPT | Performed by: NURSE PRACTITIONER

## 2023-03-21 PROCEDURE — 3075F SYST BP GE 130 - 139MM HG: CPT | Mod: CPTII,S$GLB,, | Performed by: INTERNAL MEDICINE

## 2023-03-21 PROCEDURE — 1159F MED LIST DOCD IN RCRD: CPT | Mod: CPTII,S$GLB,, | Performed by: INTERNAL MEDICINE

## 2023-03-21 PROCEDURE — 1160F PR REVIEW ALL MEDS BY PRESCRIBER/CLIN PHARMACIST DOCUMENTED: ICD-10-PCS | Mod: CPTII,S$GLB,, | Performed by: INTERNAL MEDICINE

## 2023-03-21 PROCEDURE — 1160F RVW MEDS BY RX/DR IN RCRD: CPT | Mod: CPTII,S$GLB,, | Performed by: INTERNAL MEDICINE

## 2023-03-21 PROCEDURE — 99214 PR OFFICE/OUTPT VISIT, EST, LEVL IV, 30-39 MIN: ICD-10-PCS | Mod: S$GLB,,, | Performed by: INTERNAL MEDICINE

## 2023-03-21 PROCEDURE — 99214 OFFICE O/P EST MOD 30 MIN: CPT | Mod: S$GLB,,, | Performed by: INTERNAL MEDICINE

## 2023-03-21 PROCEDURE — 85027 COMPLETE CBC AUTOMATED: CPT | Performed by: NURSE PRACTITIONER

## 2023-03-21 PROCEDURE — 3078F DIAST BP <80 MM HG: CPT | Mod: CPTII,S$GLB,, | Performed by: INTERNAL MEDICINE

## 2023-03-21 PROCEDURE — 80053 COMPREHEN METABOLIC PANEL: CPT | Performed by: NURSE PRACTITIONER

## 2023-03-21 PROCEDURE — 3078F PR MOST RECENT DIASTOLIC BLOOD PRESSURE < 80 MM HG: ICD-10-PCS | Mod: CPTII,S$GLB,, | Performed by: INTERNAL MEDICINE

## 2023-03-21 PROCEDURE — 3075F PR MOST RECENT SYSTOLIC BLOOD PRESS GE 130-139MM HG: ICD-10-PCS | Mod: CPTII,S$GLB,, | Performed by: INTERNAL MEDICINE

## 2023-03-21 RX ORDER — LIDOCAINE 50 MG/G
1 PATCH TOPICAL DAILY
Qty: 7 PATCH | Refills: 0 | Status: SHIPPED | OUTPATIENT
Start: 2023-03-21 | End: 2023-03-29 | Stop reason: SDUPTHER

## 2023-03-21 RX ORDER — HYDROCODONE BITARTRATE AND ACETAMINOPHEN 10; 325 MG/1; MG/1
1 TABLET ORAL EVERY 6 HOURS PRN
Qty: 90 TABLET | Refills: 0 | Status: SHIPPED | OUTPATIENT
Start: 2023-03-21 | End: 2023-04-20 | Stop reason: SDUPTHER

## 2023-03-21 NOTE — PROGRESS NOTES
Subjective:       Patient ID: Paul Li Jr. is a 47 y.o. male.    Chief Complaint: Malignant neoplasm involving both nipple and areola of righ    HPI    Sometimes notes pain left scapular area when lays on area  No other pain  Planned weight loss- stabilized  No fevers, chills or infectious complaints    Returns after scan- reviewed for areas of progression    - 3/20/2023 PET scan:  FINDINGS:  Quality of the study: Adequate.  In the head and neck, there are no hypermetabolic lesions worrisome for malignancy. There are no hypermetabolic mucosal lesions, and there are no pathologically enlarged or hypermetabolic lymph nodes.  In the chest, there is postoperative change of right mastectomy and right axillary lymph node dissection.  There is a new 1.1 cm right hilar lymph node (3-101) with max SUV 6.3 (603-99).  There is a right lower lobe pulmonary nodule measuring 0.9 cm, which does not show increased tracer uptake, though has been gradually enlarging (measured 0.3 cm on 06/09/2021) (3-106).  In the abdomen and pelvis, there is physiologic tracer distribution within the abdominal organs and excretion into the genitourinary system.  In the bones, there are numerous sclerotic lesions, with few index lesions detailed below:  Sclerotic lesion in T7 with tracer uptake mildly increased prior exam with SUV max 5.8 (previously 5.2) (3-107) (603-109).  T12 sclerotic focus demonstrates an SUV max of 5.9 (previously 4.8) (3-153, 603-153).  L5 vertebral body sclerotic lesion with an SUV max of 6.8 (previously 8.7) (3-213).  Newly hypermetabolic focus at the inferior aspect of the left scapula with SUV max of 7.4 (603-103).  Increased size and uptake of a left ischial tuberosity sclerotic lesion with SUV max of 7.3 (3-266).  Increased size of a L4 vertebral body sclerotic lesion (3-200).  Extensive sclerotic change through the pelvic bones grossly stable compared to the prior exam.  CT: Right maxillary mucosal retention cysts.   Left chest wall Port-A-Cath.  Postoperative change of right mastectomy with right axillary lymph node dissection.  Small bilateral fat containing inguinal hernias.  Impression:  In this patient with breast cancer status post right mastectomy and right axillary lymph node dissection there has been progression of disease.  Newly tracer avid right hilar lymph node consistent with derick metastasis.  In the bones there are numerous sclerotic lesions, some of which demonstrate new abnormal uptake on FDG PET.  Suspicious right lower lobe pulmonary nodule does not show increase tracer uptake, though it is been gradually enlarging, and attention on follow-up is advised.  This report was flagged in Epic as abnormal.    Returns for follow up for chemotherapy (Abraxane and PO Aplelisib).prior to C19     Diagnosis:  Metastatic TNBC progressed (prior Stage IB ([id number]) triple negative invasive ductal carcinoma with lobular features of right breast, retroareolar, Grade 2, Ki67 80%, diagnosed 2019)     Oncology History:  Metastatic  Set up for scans (due to back pain) which were consistent for recurrence.   Imagin/3/2021 MRI T/L spine:  FINDINGS:  Alignment: Within normal limits.  Vertebrae: Low T1 signal intensity in the left T12 vertebral body extending to the left pedicle, S1 and S2 vertebral bodies with abnormal enhancement.  The vertebral heights are well maintained.  No evidence of acute fractures.  Abnormal appearance of the LEFT sacrum and ilium as well.  Discs: Degenerative disease of the level of L4-L5 with posterior annular fissure.  Conus appears within normal limits terminating at the level of the L2. level.  Cauda equina appears unremarkable.  Mild circumferential disc bulging at the level of T10-T11 abutting the ventral thecal sac.  No significant spinal canal stenosis or neural foraminal narrowing throughout the thoracic spine.  No significant spinal canal stenosis or neural foraminal narrowing throughout the  lumbar spine.  Paraspinous muscles and soft tissues: Subcentimeter focal enhancement in the posterior column along the supraspinous ligament at the level of L1-L2 (sagittal series 21, image 10) potentially inflammatory versus neoplastic.  Impression:  Abnormal appearance of the T12, S1, S2 vertebral bodies as well as LEFT sacrum and ilium concerning for metastatic disease in this patient with history of breast cancer.  No evidence for significant central canal narrowing.  Additional findings, as above.  This report was flagged in Epic as abnormal.     6/9/2021 PET scan:  COMPARISON:  MRI thoracic and lumbar spine 06/03/2021  FINDINGS:  Quality of the study: Adequate.  In the head and neck, there are no hypermetabolic lesions worrisome for malignancy. There are no hypermetabolic mucosal lesions, and there are no pathologically enlarged or hypermetabolic lymph nodes.  In the chest, there is postoperative change of right mastectomy/right axillary lymph node dissection.  There is low level hypermetabolic activity with mild soft tissue thickening in the skin/superficial subcutaneous fat of the right chest wall (axial fused image 78) with SUV max 3.6.  There is soft tissue thickening measuring approximately 1.8 x 7.9 cm in the subcutaneous fat of the right chest wall (axial series 3, image 84) with SUV max 3.1.  There are a few bilateral pulmonary nodules measuring up to 0.3 cm in the left lung (axial series 3, image 88) and 0.5 cm in the right lung (axial series 3, image 84).  These nodules are too small to characterize by PET.  There are no pathologically enlarged or hypermetabolic lymph nodes.  In the abdomen and pelvis, there is physiologic tracer distribution within the abdominal organs and excretion into the genitourinary system.  Subtle sen mesentery and multiple prominent mesenteric lymph nodes measuring up to 1.8 cm in long axis with background activity.  In the bones, there are several hypermetabolic lesions  worrisome for malignancy.  Sclerotic lesion in the left T12 vertebral body (axial series 3, image 131) with SUV max 12.  Sclerotic lesions in the sacrum (for example axial series 3, image 188) with SUV max 9.7.  Sclerotic lesions in the left iliac bone (for example axial series 3, image 205) with SUV max 6.  In the extremities, there are no hypermetabolic lesions worrisome for malignancy.  Incidental findings:  Bilateral maxillary antra mucous retention cysts.  Fat containing right inguinal hernia.  Impression:  1. In this patient with right breast carcinoma status post mastectomy/right axillary lymph node dissection, there are multiple sclerotic lesions in the T12 vertebral body, sacrum, and left iliac bone with hypermetabolic activity concerning for active metastatic disease and in keeping with findings on MRI 06/03/2021.  2. Additionally there is low-level hypermetabolic activity with soft tissue thickening in the right chest wall as detailed above.  These findings are nonspecific and may be related to postoperative/post radiation change, with recurrent malignancy not excluded.  3. Nonspecific mesenteric haziness and lymph nodes which may indicate mesenteric adenitis.  Recommend clinical correlation and attention on follow-up.  4. Additional findings as above.  I, Sohan Tillman MD, attest that I reviewed and interpreted the images.     In summary,   Started aplelisib 8/22/2021   Abraxane C1 started 8/13/2021  We had sent Guardant and discussed results with Molecular tumor board  He also had a repeat bx to confirm metastatic triple negative breast cancer  Plan:   Nab-Paclitaxel and Alpelisib  Reference: https://ascopubs.org/doi/abs/10.1200/JCO.[email or web address]_[email or web address]  Also on Zometa-      - C1D1 Abraxane 8/13/2021  - Started aplelisib 8/22/2021               Oncology History   Malignant neoplasm involving both nipple and areola of right breast in male, estrogen receptor negative   5/1/2019 Imaging Significant Findings      Mammogram and US  Impression:  Right  Mass: Right breast 14 mm mass at the retroareolar anterior position. Assessment: 4 - Suspicious finding. Biopsy is recommended.   Left  There is no mammographic or sonographic evidence of malignancy.  Recommendation:  Biopsy is recommended.      5/2/2019 Biopsy     BREAST, RIGHT, RETROAREOLAR MASS, ULTRASOUND-GUIDED BIOPSY:  Invasive ductal carcinoma with lobular features.  Size of invasive carcinoma: 5 MM in greatest linear dimension within a single      5/2/2019 Breast Tumor Markers     Estrogen: Negative  Progesterone: Negative  HER2: Negative  Ki67: 80      5/17/2019 Cancer Staged     Staging form: Breast, AJCC 8th Edition  - Clinical stage from 5/17/2019: Stage IB (cT1c, cN0, cM0, G2, ER-, FL-, HER2-) - Signed by Richa Bahena MD on 5/17/2019 5/27/2019 - 7/21/2019 Chemotherapy     Treatment Summary   Plan Name: OP BREAST DOSE-DENSE AC - DOXORUBICIN CYCLOPHOSPHAMIDE Q2W  Treatment Goal: Curative  Status: Inactive  Start Date: 5/27/2019  End Date: 7/9/2019  Provider: Richa Bahena MD  Chemotherapy: DOXOrubicin chemo injection 186 mg, 60 mg/m2 = 186 mg, Intravenous, Clinic/HOD 1 time, 4 of 4 cycles  Administration: 186 mg (5/27/2019), 186 mg (6/10/2019), 186 mg (6/24/2019), 186 mg (7/8/2019)  cyclophosphamide (CYTOXAN) 600 mg/m2 = 1,865 mg in sodium chloride 0.9% 250 mL chemo infusion, 600 mg/m2 = 1,865 mg, Intravenous, Clinic/HOD 1 time, 4 of 4 cycles  Administration: 1,865 mg (5/27/2019), 1,865 mg (6/10/2019), 1,865 mg (6/24/2019), 1,865 mg (7/8/2019)      7/22/2019 - 10/15/2019 Chemotherapy     Treatment Summary   Plan Name: OP PACLITAXEL QW  Treatment Goal: Curative  Status: Inactive  Start Date: 7/24/2019  End Date: 10/8/2019  Provider: Richa Bahena MD  Chemotherapy: PACLitaxel (TAXOL) 80 mg/m2 = 246 mg in sodium chloride 0.9% 250 mL chemo infusion, 80 mg/m2 = 246 mg, Intravenous, Clinic/HOD 1 time, 12 of 12 cycles  Dose modification: 60 mg/m2  (original dose 80 mg/m2, Cycle 3), 55 mg/m2 (original dose 80 mg/m2, Cycle 7), 60 mg/m2 (original dose 80 mg/m2, Cycle 7), 50 mg/m2 (original dose 80 mg/m2, Cycle 9), 50 mg/m2 (original dose 80 mg/m2, Cycle 10)  Administration: 246 mg (7/24/2019), 246 mg (7/31/2019), 186 mg (8/7/2019), 186 mg (8/14/2019), 186 mg (8/21/2019), 186 mg (8/28/2019), 186 mg (9/4/2019), 174 mg (9/11/2019), 156 mg (9/18/2019), 156 mg (9/25/2019), 156 mg (10/1/2019), 156 mg (10/8/2019)      11/22/2019 Breast Surgery     On 11/22/19 Dr whyte performed a right breast mastectomy with SLN biopsy with pathology showing grade 2, 6 mm IDC with extensive treatment effect, no LVI with 1 LN positive 4 mm, negative margins. The on 12/17/19, Dr Whyte performed right axillary dissection with pathology still pending.      11/22/2019 Cancer Staged     ypT1b N1      12/17/2019 Breast Surgery     Surgery: Dr Shima Whyte, [phone] performed right ALND with pathology showing 14 negative lymph nodes.             1/14/2020 Tumor Conference      Post mastectomy radiation therapy: Xeloda, then possible Keytruda trial .      2/3/2020 - 3/23/2020 Radiation Therapy     Treating physician: Speedy Nair MD   Total Dose: 50.4 Gy to the chest wall and regional lymphatics  10 Gy boost to the prostate.   Fractions: 28 fractions at 1.8 Gy per fraction  5 fractions at 2 Gy per fraction       2/28/2020 -  Chemotherapy     * 4/15/2020 - 9/28/20 completed 6 cycles adjuvant Xeloda 1000 mg/m2 bid days 1-14 every 21 days  Treatment Summary   Plan Name: OP CAPECITABINE 1250MG/M2 BID Q3W  Treatment Goal: Curative  Status: Active  Start Date: 3/20/2020  End Date: 3/20/2020  Provider: Richa Bahena MD  Chemotherapy: [No matching medication found in this treatment plan]            Hydrocele repaired.      Review of Systems   Constitutional:  Negative for activity change, appetite change, chills, fatigue, fever and unexpected weight change.   HENT:  Negative for dental  problem, postnasal drip, rhinorrhea, sinus pressure/congestion, sore throat, trouble swallowing and voice change.    Eyes:  Negative for visual disturbance.   Respiratory:  Negative for cough, shortness of breath and wheezing.    Cardiovascular:  Negative for chest pain, palpitations and leg swelling.   Gastrointestinal:  Negative for abdominal distention, abdominal pain, blood in stool, change in bowel habit, constipation, diarrhea, nausea, vomiting and change in bowel habit.   Endocrine: Negative for cold intolerance and heat intolerance.   Genitourinary:  Negative for decreased urine volume, difficulty urinating, dysuria, frequency, hematuria and urgency.   Musculoskeletal:  Negative for arthralgias, back pain, gait problem, joint swelling, myalgias, neck pain and neck stiffness.        Lymphedema right arm   Integumentary:  Negative for color change, pallor, rash and wound.        Right arm lymphedema    Neurological:  Positive for numbness (improved neuropathy; right hand fingers). Negative for dizziness, weakness, light-headedness and headaches.   Hematological:  Negative for adenopathy. Does not bruise/bleed easily.   Psychiatric/Behavioral:  Negative for dysphoric mood and sleep disturbance. The patient is not nervous/anxious.        Objective:      Physical Exam  Vitals and nursing note reviewed.   Constitutional:       General: He is not in acute distress.     Appearance: Normal appearance. He is well-developed. He is obese. He is not diaphoretic.      Comments: Presents alone  Very pleasant  ECOG= 0   HENT:      Head: Normocephalic and atraumatic.   Eyes:      General: No scleral icterus.     Extraocular Movements: Extraocular movements intact.      Conjunctiva/sclera: Conjunctivae normal.      Pupils: Pupils are equal, round, and reactive to light.   Neck:      Thyroid: No thyromegaly.      Trachea: No tracheal deviation.   Cardiovascular:      Rate and Rhythm: Normal rate and regular rhythm.      Heart  sounds: Normal heart sounds. No murmur heard.    No friction rub. No gallop.   Pulmonary:      Effort: Pulmonary effort is normal. No respiratory distress.      Breath sounds: Normal breath sounds. No wheezing, rhonchi or rales.   Chest:      Chest wall: No tenderness.   Abdominal:      General: Bowel sounds are normal. There is no distension.      Palpations: Abdomen is soft. There is no mass.      Tenderness: There is no abdominal tenderness. There is no guarding or rebound.      Comments: No organomegaly   Musculoskeletal:         General: Swelling (chronic RUE lymphedema) present. No tenderness or deformity. Normal range of motion.      Cervical back: Normal range of motion and neck supple. No rigidity or tenderness.      Right lower leg: No edema.      Left lower leg: No edema.   Lymphadenopathy:      Cervical: No cervical adenopathy.   Skin:     General: Skin is warm and dry.      Coloration: Skin is not jaundiced or pale.      Findings: No erythema or rash.   Neurological:      General: No focal deficit present.      Mental Status: He is alert and oriented to person, place, and time. Mental status is at baseline.      Cranial Nerves: No cranial nerve deficit.      Sensory: No sensory deficit.      Motor: No weakness or abnormal muscle tone.      Coordination: Coordination normal.      Gait: Gait normal.      Deep Tendon Reflexes: Reflexes are normal and symmetric. Reflexes normal.   Psychiatric:         Mood and Affect: Mood normal.         Behavior: Behavior normal.         Thought Content: Thought content normal.         Judgment: Judgment normal.       Assessment:       Problem List Items Addressed This Visit       Type 2 diabetes mellitus without complication    Malignant neoplasm involving both nipple and areola of right breast in male, estrogen receptor negative - Primary    Relevant Orders    Tumor NGS Blood    Tumor NGS Blood Add-on(only to be ordered with Blood NGS)    Class 3 severe obesity due to  excess calories with serious comorbidity and body mass index (BMI) of 50.0 to 59.9 in adult    Bone metastases    Anemia associated with chemotherapy     Other Visit Diagnoses       Neoplasm related pain        Relevant Medications    LIDOcaine (LIDODERM) 5 %    HYDROcodone-acetaminophen (NORCO)  mg per tablet            Plan:       Pain medications refilled for neoplasm pain- well controlled    Metastatic TNBC-- we discussed progression  Tempus sent and we will determine next treatment options    HTN, DM- better managed    Continue Zometa    Route Chart for Scheduling    Med Onc Chart Routing      Follow up with physician . Cancel 3/23 appt chemo and only needs Zometa; cancel 3/30 chemo and 4/6 chemo- awaiting tempus for next plans so see me in approx 3 weeks   Follow up with J[facility]    Infusion scheduling note    Injection scheduling note    Labs    Imaging    Pharmacy appointment    Other referrals           Treatment Plan Information   OP BREAST NAB-PACLITAXEL D1, D8, D15 + ALPELISIB    Rosa Linn MD   Upcoming Treatment Dates - OP BREAST NAB-PACLITAXEL D1, D8, D15 + ALPELISIB     3/11/2023       Chemotherapy       PACLitaxeL protein-bound (ABRAXANE) 80 mg/m2 = 220 mg in 44 mL infusion    Supportive Plan Information  OP FILGRASTIM 480 MCG   Michelle Garyson NP   Upcoming Treatment Dates - OP FILGRASTIM 480 MCG    No upcoming days in selected categories.    Therapy Plan Information  PORT FLUSH  Flushes  heparin, porcine (PF) 100 unit/mL injection flush 500 Units  500 Units, Intravenous, Every visit  sodium chloride 0.9% flush 10 mL  10 mL, Intravenous, Every visit    ZOLEDRONIC ACID (ZOMETA) IV  Medications  zoledronic 4 mg/100 mL infusion 4 mg  4 mg, Intravenous, Every 4 weeks  Flushes  sodium chloride 0.9% 250 mL flush bag  Intravenous, Every 4 weeks  sodium chloride 0.9% flush 10 mL  10 mL, Intravenous, Every 4 weeks  heparin, porcine (PF) 100 unit/mL injection flush 500 Units  500 Units, Intravenous,  Every 4 weeks  alteplase injection 2 mg  2 mg, Intra-Catheter, Every 4 weeks

## 2023-03-23 ENCOUNTER — CLINICAL SUPPORT (OUTPATIENT)
Dept: REHABILITATION | Facility: HOSPITAL | Age: 48
End: 2023-03-23
Payer: MEDICARE

## 2023-03-23 ENCOUNTER — INFUSION (OUTPATIENT)
Dept: INFUSION THERAPY | Facility: HOSPITAL | Age: 48
End: 2023-03-23
Payer: MEDICARE

## 2023-03-23 VITALS
TEMPERATURE: 98 F | DIASTOLIC BLOOD PRESSURE: 87 MMHG | RESPIRATION RATE: 18 BRPM | SYSTOLIC BLOOD PRESSURE: 139 MMHG | OXYGEN SATURATION: 99 % | HEART RATE: 65 BPM

## 2023-03-23 DIAGNOSIS — Z74.09 IMPAIRED FUNCTIONAL MOBILITY AND ACTIVITY TOLERANCE: ICD-10-CM

## 2023-03-23 DIAGNOSIS — Z17.1 MALIGNANT NEOPLASM INVOLVING BOTH NIPPLE AND AREOLA OF RIGHT BREAST IN MALE, ESTROGEN RECEPTOR NEGATIVE: Primary | ICD-10-CM

## 2023-03-23 DIAGNOSIS — C79.51 BONE METASTASES: ICD-10-CM

## 2023-03-23 DIAGNOSIS — C50.021 MALIGNANT NEOPLASM INVOLVING BOTH NIPPLE AND AREOLA OF RIGHT BREAST IN MALE, ESTROGEN RECEPTOR NEGATIVE: Primary | ICD-10-CM

## 2023-03-23 DIAGNOSIS — I89.0 LYMPHEDEMA OF RIGHT ARM: Primary | ICD-10-CM

## 2023-03-23 PROCEDURE — 25000003 PHARM REV CODE 250: Performed by: NURSE PRACTITIONER

## 2023-03-23 PROCEDURE — 63600175 PHARM REV CODE 636 W HCPCS: Performed by: INTERNAL MEDICINE

## 2023-03-23 PROCEDURE — 96361 HYDRATE IV INFUSION ADD-ON: CPT

## 2023-03-23 PROCEDURE — 96374 THER/PROPH/DIAG INJ IV PUSH: CPT

## 2023-03-23 PROCEDURE — 97140 MANUAL THERAPY 1/> REGIONS: CPT

## 2023-03-23 PROCEDURE — 63600175 PHARM REV CODE 636 W HCPCS: Performed by: NURSE PRACTITIONER

## 2023-03-23 RX ORDER — HEPARIN 100 UNIT/ML
500 SYRINGE INTRAVENOUS
Status: DISCONTINUED | OUTPATIENT
Start: 2023-03-23 | End: 2023-03-23 | Stop reason: HOSPADM

## 2023-03-23 RX ORDER — SODIUM CHLORIDE 0.9 % (FLUSH) 0.9 %
10 SYRINGE (ML) INJECTION
Status: DISCONTINUED | OUTPATIENT
Start: 2023-03-23 | End: 2023-03-23 | Stop reason: HOSPADM

## 2023-03-23 RX ORDER — ZOLEDRONIC ACID 0.04 MG/ML
4 INJECTION, SOLUTION INTRAVENOUS
Status: COMPLETED | OUTPATIENT
Start: 2023-03-23 | End: 2023-03-23

## 2023-03-23 RX ORDER — SODIUM CHLORIDE 9 MG/ML
INJECTION, SOLUTION INTRAVENOUS CONTINUOUS
Status: DISCONTINUED | OUTPATIENT
Start: 2023-03-23 | End: 2023-03-23 | Stop reason: HOSPADM

## 2023-03-23 RX ORDER — ZOLEDRONIC ACID 0.04 MG/ML
4 INJECTION, SOLUTION INTRAVENOUS
Status: CANCELLED | OUTPATIENT
Start: 2023-04-20

## 2023-03-23 RX ORDER — SODIUM CHLORIDE 0.9 % (FLUSH) 0.9 %
10 SYRINGE (ML) INJECTION
Status: CANCELLED | OUTPATIENT
Start: 2023-04-20

## 2023-03-23 RX ORDER — HEPARIN 100 UNIT/ML
500 SYRINGE INTRAVENOUS
Status: CANCELLED | OUTPATIENT
Start: 2023-04-20

## 2023-03-23 RX ADMIN — HEPARIN 500 UNITS: 100 SYRINGE at 11:03

## 2023-03-23 RX ADMIN — ZOLEDRONIC ACID 4 MG: 0.04 INJECTION, SOLUTION INTRAVENOUS at 10:03

## 2023-03-23 RX ADMIN — SODIUM CHLORIDE: 9 INJECTION, SOLUTION INTRAVENOUS at 09:03

## 2023-03-23 NOTE — PROGRESS NOTES
Physical Therapy Daily Treatment Note       Date: 3/23/2023   Name: Paul Li Jr.  Clinic Number: [id number]    Therapy Diagnosis:   Encounter Diagnoses   Name Primary?    Lymphedema of right arm Yes    Impaired functional mobility and activity tolerance      Physician: Rosa Linn MD    Physician Orders: PT Eval and Treat -RUE lymphedema  Medical Diagnosis: Malignant neoplasm involving both nipple and areola of right breast in male, estrogen receptor negative [C50.021, Z17.1], Lymphedema of right upper extremity   Evaluation Date: 10/10/2022  Authorization Period Expiration: 3/30/23  Updated Plan of Care Certification Period: 4/7/23  Visit # / Visits authorized: 21 (8/12 newly authorized)   Insurance: [employer]  FOTO: 2/3     Time In: 8:05 AM   Time Out: 9:03AM  Total Billable Time: 58 minutes     Precautions: Standard, Fall, cancer, and Spine, Bony Mets, hx of Seizures, L chest wall port      Subjective     Pt reports: He has been wearing a compression sock as a sleeve, and it is working pretty well. Pt's compression pump is in the mail.  He was compliant with self manual lymph drainage  Response to previous treatment: no adverse reaction  Pain Scale: Paul rates pain on a scale of 0/10 on VAS.   Pain location: N/A    Fatigue: none reported  Functional change: none stated  Treatment:   Chemotherapy: te-adjuvant chemo therapy completed 10/19  Pt is currently on chemotherapy: pt is getting 1 infusion/week for 3 weeks with 1 week break, and he takes oral chemotherapy daily.  Radiation: Completed in February 2020  Surgery date: 12/17/19 pt underwent right axillary lymph node dissection and resection excess lateral tissue; 11/22/19 right simple mastectomy with SLNB; total of 18 lymph nodes removed      Objective     Objective Measures updated at progress report unless specified.     LANDMARK RIGHT UE LEFT UE DIFF RUE Diff RUE Diff RUE Diff  "RUE Diff RUE Diff RUE Diff RUE Diff RUE Diff RUE Diff RUE Diff RUE Diff RUE Diff RUE Diff RUE Diff   Date Eval Eval Eval 10/17/22 10/17/22 10/29/22 10/19/22 11/2/22 11/2/22 11/11/22 11/11/22 11/29/22 11/29/22 12/5/22 12/5/22 12/13/22 12/13/22 12/21/22 12/21/22 12/30/22 12/30/22 1/6/23 1/6/23 1/26/23 1/26/23 2/6/23 2/6/23 3/6/23 3/6/23 3/23/23 3/23/23   E + 8" 50 cm 46.5 cm 3.5 cm 48 cm -2 47.5 cm -0.5 50.5 cm +3 47.5 cm -3 49.5 cm +2 50.5 cm +1 48.5 cm -2 49 cm +0.5 48 cm -1 50 cm +2 48.5 cm -1.5 49 cm +0.5 49 cm No chg 47 cm -2   E + 6" 45.5 cm 44 cm 1.5 cm 45 cm -0.5 46.5 cm +1.5 47 cm +0.5 45 cm -2 45.5 cm +0.5 46 cm +0.5 45 cm -1 45.5 cm +0.5 45.5 cm No chg 47 cm +1.5 45 cm -2 45.5 cm +0.5 46.5 cm +1 44 cm -2.5   E + 4" 40 cm 40.5 cm 0.5 cm 40.5 cm +0.5 40.5cm No chg 41 cm +0.5 40.5 cm -0.5 40.5 cm No chg 40 cm -0.5 41.5 cm +1.5  41 cm -0.5 41 cm No chg 42.5 cm +1.5 40 cm -2.5 41.5 cm +1.5 41.5 cm No chg 41 cm -0.5   E + 2" 38.5 cm 38.5 cm 0 cm 39 cm +0.5 40 cm +1 39.5 cm -0.5 38.5 cm -1 38 cm -0.5 39 cm +1 39 cm No chg 39 cm No chg 39 cm No chg 39.5 cm +0.5 38.5 cm -1 37.5 cm -1 38 cm +0.5 38 cm No chg   Elbow 36.5 cm 34 cm 2.5 cm 35.5 cm -1 35.5 cm No chg 36 cm +0.5 34.5 cm -1.5 36 cm +1.5 36 cm No chg 35 cm -1 36 cm +1 35.5 cm -0.5 37.5 cm +2 35 cm -2.5 35 cm No chg 34 cm -1 34.5 cm +0.5   W+ 8" 37.5cm 33 cm 4.5 cm 38cm +0.5 36.5 cm -1.5 37.5 cm +1 36.5 cm -1 37.5 cm +1 37.5 cm No chg 37 cm -0.5 37 cm No chg 37 cm No chg 38 cm +1 37 cm -1 37.5 cm +0.5 36.5 cm -1 36.5 cm No chg   W +  6" 35 cm 29.5 cm 5.5 cm 34 cm -1 34.5 cm +0.5 33.5 cm -1 32.5 cm -1 33 cm +0.5 33 cm No chg 33cm No chg 34.5 cm +1.5 34.5 cm No chg 36 cm +1.5 33.5 cm -2.5 34.5 cm +1 34 cm -0.5 33 cm -1   W + 4" 31.5 cm 25 cm 6.5 cm 31.5 cm No chg 31.5 cm No chg 29.5 cm -2 29 cm -0.5 30 cm +1 28 cm -2 29 cm +1 29 cm No chg 29.5 cm +0.5 32 cm +2.5 30 cm -2 30 cm No chg 29.5 cm -0.5 29 cm +0.5   Wrist 23 cm 20 cm 3 cm 22.5cm -0.5 21.5 cm -1 23 cm " +1.5 21 cm -2 24 cm +3 22 cm -2 22 cm No chg 22.5 cm +0.5 23 cm +0.5 23 cm No chg 22 cm -1 21 cm -1 21.5 cm +0.5 22.5 cm +1   DPC 28.5 cm 26 cm 2.5 cm 28 cm -0.5 27.5cm -0.5 28.5 cm +1 27 cm -1.5 28 cm +1 27 cm -1 26.5 cm -0.5 28 cm +1.5 27.5 cm -0.5 27 cm -0.5 28 cm +1 25.5 cm -2.5 26 cm +0.5 25.5 cm -0.5   IP Thumb 8.5 cm 8 cm 0.5 cm 8.5 cm No chg 8.5 cm No chg 9 cm +0.5 8.5 cm -0.5 8.5 cm No chg 9 cm +0.5 8.5 cm -0.5 8.5 cm No chg 8.5 cm No chg 9 cm +0.5 9 cm No chg 8.5 cm -0.5 9 cm +0.5 9 cm No chg            Treatment         Paul received the following manual therapy techniques were performed to increased myofascial/soft tissue length, mobility and pliability, increase PROM, AROM and function as well as to decrease pain for 58 minutes    Measurements taken above    Diaphragmatic breathing x 5 reps    Short Neck- held due to history of seizures  Clear Healthy Lymph Nodes:  Left Axilla                                                  Right Groin  Open Anastomoses:  Anterior Axillo-Axillary  (AAA)                                    Axillo-Inguinal     (AI)                                    Posterior Axillo-Axillary  (GEREMIAS)     Right Upper Arm (Lateral and Medial)  Right  Antecubital Fossa  Right Dorsal Forearm  Right Volar Forearm  Dorsum Right Hand  Right fingers     Rework Right UE  Rework Anastomoses  Rework Healthy lymph Nodes      PT applies gauze to right fingers and hand, stockinette, cotton artiflex, and short stretch bandages to pt's RIGHT/LEFT/BILATERAL: right upper extremity.  Pt educated to wear bandaging for next 2-3 days unless it causes pain, numbness, or changes in skin color/temperature, or if they start to fall down.  If removed, pt instructed to roll up bandages and cotton padding and bring to next session. If bandages are removed, pt can wear his tubigrip.  Pt has Vet glove to cover bandages in the shower, and we reviewed directions on how to care for bandages. Pt verbalizes understanding of  all topics.                Home Exercise Program and Patient Education   Education provided re:  - role of PT in multi - disciplinary team, goals for PT  - progress towards goals         Written Home Exercises Provided: Patient instructed to cont prior HEP.  Exercises were reviewed and Paul was able to demonstrate them prior to the end of the session.  Paul demonstrated good  understanding of the education provided.     See EMR under Media for self lymphatic drainage provided prior visit.    Pt has no cultural, educational or language barriers to learning provided.    Assessment     Patient tolerated session well. Primarily he demo's a substantial reduction in his right arm circumference. He is awaiting arrival of his compression pump. He has been compliant with exercise, elevation, self manual lymph drainage, and wearing compression bandages/tubigrip, yet lymphedema persists. He tolerated complete decongestive therapy well. Once his measurements stabilize, he will benefit from a compression sleeve and glove. He has met goals as noted below and will benefit from continued therapy to work towards remaining goals.      Pt prognosis is Good. Pt will continue to benefit from skilled outpatient physical therapy to address the deficits listed in the problem list chart on initial evaluation, provide pt/family education and to maximize pt's level of independence in the home and community environment.     Anticipated barriers to physical therapy: advanced disease    Goals as follows:  Short Term Goals (6 weeks)  1. Pt will demo decrease in girth in right upper extremity by >/= 2cm to allow for improved UE symmetry, clothing choice, ROM, and UE function. (Met, 1/26/23  2. Patient will demonstrate 100% knowledge of lymphedema precautions and signs of infection to allow for reduced risk of lymphedema, reduced risk of infection, and/or exacerbation.  (met)  3. Patient will perform self lymph drainage techniques to enhance  lymphatic drainage and mobility.  ( met)  4. Patient will tolerate daily activities with multilayered bandaging to enhance lymphatic drainage and skin elasticity.  (met)  5. Pt will tolerate HEP for improved strength, functional mobility, ROM, posture, and endurance. (met)        Long Term Goals: ( 12  weeks)  1. Patient to show decreased girth in right upper extremity by >/= 3cm to allow for improved UE symmetry, clothing choice, ROM, and UE function.  (progressing, not met)  2. Patient will show reduction in density to mild or less with improved contour of limb to allow for improved cosmesis, improved lymphatic drainage, and functional mobility.  (progressing, not met)  3. Patient to bruna/doff compression garment with daily compliance to support lymphatic mobility and skin elasticity.  (progressing, not met)  4. Pt to show improved postural awareness and alignment for improved functional mobility.  (progressing, not met)  5. Pt to be independent and compliant with HEP to allow for improved ROM, reach and carry with functional endurance and strength.    (progressing, not met)           Plan   Outpatient physical therapy 2x week for 4 weeks to include the following:   Manual Therapy, Neuromuscular Re-ed, Orthotic Management and Training, Patient Education, Self Care, Therapeutic Activities, and Therapeutic Exercise.     Plan of care Certification Period: 3/6/23 to 4/7/23       Therapist: Katelynn Harrell, PT  3/23/2023

## 2023-03-23 NOTE — PLAN OF CARE
Pt ambulatory to clinic today alone for IVF's and Abraxane infusions. Pt denies any sig complaints. Pressure wrap to RUE for lymphedema. Pt states he is not getting Abraxane today only Zometa. Reached out to Dr Linn and verified the same. Progressed and will be starting on a new medication. Tolerated Zometa well. Stayed for an hours worth of IVF's. Port deaccessed after flushing. Ambulatory from clinic in [facility].

## 2023-03-27 ENCOUNTER — CLINICAL SUPPORT (OUTPATIENT)
Dept: REHABILITATION | Facility: HOSPITAL | Age: 48
End: 2023-03-27
Payer: MEDICARE

## 2023-03-27 DIAGNOSIS — Z74.09 IMPAIRED FUNCTIONAL MOBILITY AND ACTIVITY TOLERANCE: ICD-10-CM

## 2023-03-27 DIAGNOSIS — I89.0 LYMPHEDEMA OF RIGHT ARM: Primary | ICD-10-CM

## 2023-03-27 PROCEDURE — 97140 MANUAL THERAPY 1/> REGIONS: CPT

## 2023-03-27 NOTE — PROGRESS NOTES
Physical Therapy Daily Treatment Note       Date: 3/27/2023   Name: Paul Li Jr.  Clinic Number: [id number]    Therapy Diagnosis:   Encounter Diagnoses   Name Primary?    Lymphedema of right arm Yes    Impaired functional mobility and activity tolerance      Physician: Rosa Linn MD    Physician Orders: PT Eval and Treat -RUE lymphedema  Medical Diagnosis: Malignant neoplasm involving both nipple and areola of right breast in male, estrogen receptor negative [C50.021, Z17.1], Lymphedema of right upper extremity   Evaluation Date: 10/10/2022  Authorization Period Expiration: 3/30/23  Updated Plan of Care Certification Period: 4/7/23  Visit # / Visits authorized: 22 (9/12 newly authorized)   Insurance: [employer]  FOTO: 2/3     Time In: 8:09 AM   Time Out: 9:03 AM  Total Billable Time: 54 minutes     Precautions: Standard, Fall, cancer, and Spine, Bony Mets, hx of Seizures, L chest wall port      Subjective     Pt reports: His pump arrived and he wanted to make sure he has the right one. Pt states he has bone pain from the medication to improve his WBC.  He was compliant with self manual lymph drainage  Response to previous treatment: no adverse reaction  Pain Scale: Paul rates pain on a scale of 0/10 on VAS.   Pain location: N/A    Fatigue: none reported  Functional change: none stated  Treatment:   Chemotherapy: te-adjuvant chemo therapy completed 10/19  Pt is currently on chemotherapy: pt is getting 1 infusion/week for 3 weeks with 1 week break, and he takes oral chemotherapy daily.  Radiation: Completed in February 2020  Surgery date: 12/17/19 pt underwent right axillary lymph node dissection and resection excess lateral tissue; 11/22/19 right simple mastectomy with SLNB; total of 18 lymph nodes removed      Objective     Objective Measures updated at progress report unless specified.     LANDMARK RIGHT UE LEFT UE DIFF RUE Diff RUE  "Diff RUE Diff RUE Diff RUE Diff RUE Diff RUE Diff RUE Diff RUE Diff RUE Diff RUE Diff RUE Diff RUE Diff RUE Diff   Date Eval Eval Eval 10/17/22 10/17/22 10/29/22 10/19/22 11/2/22 11/2/22 11/11/22 11/11/22 11/29/22 11/29/22 12/5/22 12/5/22 12/13/22 12/13/22 12/21/22 12/21/22 12/30/22 12/30/22 1/6/23 1/6/23 1/26/23 1/26/23 2/6/23 2/6/23 3/6/23 3/6/23 3/23/23 3/23/23   E + 8" 50 cm 46.5 cm 3.5 cm 48 cm -2 47.5 cm -0.5 50.5 cm +3 47.5 cm -3 49.5 cm +2 50.5 cm +1 48.5 cm -2 49 cm +0.5 48 cm -1 50 cm +2 48.5 cm -1.5 49 cm +0.5 49 cm No chg 47 cm -2   E + 6" 45.5 cm 44 cm 1.5 cm 45 cm -0.5 46.5 cm +1.5 47 cm +0.5 45 cm -2 45.5 cm +0.5 46 cm +0.5 45 cm -1 45.5 cm +0.5 45.5 cm No chg 47 cm +1.5 45 cm -2 45.5 cm +0.5 46.5 cm +1 44 cm -2.5   E + 4" 40 cm 40.5 cm 0.5 cm 40.5 cm +0.5 40.5cm No chg 41 cm +0.5 40.5 cm -0.5 40.5 cm No chg 40 cm -0.5 41.5 cm +1.5  41 cm -0.5 41 cm No chg 42.5 cm +1.5 40 cm -2.5 41.5 cm +1.5 41.5 cm No chg 41 cm -0.5   E + 2" 38.5 cm 38.5 cm 0 cm 39 cm +0.5 40 cm +1 39.5 cm -0.5 38.5 cm -1 38 cm -0.5 39 cm +1 39 cm No chg 39 cm No chg 39 cm No chg 39.5 cm +0.5 38.5 cm -1 37.5 cm -1 38 cm +0.5 38 cm No chg   Elbow 36.5 cm 34 cm 2.5 cm 35.5 cm -1 35.5 cm No chg 36 cm +0.5 34.5 cm -1.5 36 cm +1.5 36 cm No chg 35 cm -1 36 cm +1 35.5 cm -0.5 37.5 cm +2 35 cm -2.5 35 cm No chg 34 cm -1 34.5 cm +0.5   W+ 8" 37.5cm 33 cm 4.5 cm 38cm +0.5 36.5 cm -1.5 37.5 cm +1 36.5 cm -1 37.5 cm +1 37.5 cm No chg 37 cm -0.5 37 cm No chg 37 cm No chg 38 cm +1 37 cm -1 37.5 cm +0.5 36.5 cm -1 36.5 cm No chg   W +  6" 35 cm 29.5 cm 5.5 cm 34 cm -1 34.5 cm +0.5 33.5 cm -1 32.5 cm -1 33 cm +0.5 33 cm No chg 33cm No chg 34.5 cm +1.5 34.5 cm No chg 36 cm +1.5 33.5 cm -2.5 34.5 cm +1 34 cm -0.5 33 cm -1   W + 4" 31.5 cm 25 cm 6.5 cm 31.5 cm No chg 31.5 cm No chg 29.5 cm -2 29 cm -0.5 30 cm +1 28 cm -2 29 cm +1 29 cm No chg 29.5 cm +0.5 32 cm +2.5 30 cm -2 30 cm No chg 29.5 cm -0.5 29 cm +0.5   Wrist 23 cm 20 cm 3 cm 22.5cm -0.5 " 21.5 cm -1 23 cm +1.5 21 cm -2 24 cm +3 22 cm -2 22 cm No chg 22.5 cm +0.5 23 cm +0.5 23 cm No chg 22 cm -1 21 cm -1 21.5 cm +0.5 22.5 cm +1   DPC 28.5 cm 26 cm 2.5 cm 28 cm -0.5 27.5cm -0.5 28.5 cm +1 27 cm -1.5 28 cm +1 27 cm -1 26.5 cm -0.5 28 cm +1.5 27.5 cm -0.5 27 cm -0.5 28 cm +1 25.5 cm -2.5 26 cm +0.5 25.5 cm -0.5   IP Thumb 8.5 cm 8 cm 0.5 cm 8.5 cm No chg 8.5 cm No chg 9 cm +0.5 8.5 cm -0.5 8.5 cm No chg 9 cm +0.5 8.5 cm -0.5 8.5 cm No chg 8.5 cm No chg 9 cm +0.5 9 cm No chg 8.5 cm -0.5 9 cm +0.5 9 cm No chg        Pt's pump appears to be correct/appropriate and what the therapist ordered.    Treatment         Paul received the following manual therapy techniques were performed to increased myofascial/soft tissue length, mobility and pliability, increase PROM, AROM and function as well as to decrease pain for 54 minutes      Diaphragmatic breathing x 5 reps    Short Neck- held due to history of seizures  Clear Healthy Lymph Nodes:  Left Axilla                                                  Right Groin  Open Anastomoses:  Anterior Axillo-Axillary  (AAA)                                    Axillo-Inguinal     (AI)                                    Posterior Axillo-Axillary  (GEREMIAS) -not performed today    J-stroke at lateral chest wall  Inferior trunk slide  Repeat J-stroke    Right Upper Arm (Lateral and Medial)  Right  Antecubital Fossa  Right Dorsal Forearm  Right Volar Forearm  Dorsum Right Hand  Right fingers     Rework Right UE  Rework Anastomoses  Rework Healthy lymph Nodes      PT applies gauze to right fingers and hand, stockinette, cotton artiflex, and short stretch bandages to pt's RIGHT/LEFT/BILATERAL: right upper extremity.  Pt educated to wear bandaging until next session unless it causes pain, numbness, or changes in skin color/temperature, or if they start to fall down.  If removed, pt instructed to roll up bandages and cotton padding and bring to next session. If bandages are removed, pt  can wear his tubigrip.  Pt has Vet glove to cover bandages in the shower, and we reviewed directions on how to care for bandages. Pt verbalizes understanding of all topics.                Home Exercise Program and Patient Education   Education provided re:  - role of PT in multi - disciplinary team, goals for PT  - progress towards goals         Written Home Exercises Provided: Patient instructed to cont prior HEP.  Exercises were reviewed and Paul was able to demonstrate them prior to the end of the session.  Paul demonstrated good  understanding of the education provided.     See EMR under Media for self lymphatic drainage provided prior visit.    Pt has no cultural, educational or language barriers to learning provided.    Assessment     Patient tolerated session well.  He tolerated complete decongestive therapy well. Once his measurements stabilize, he will benefit from a compression sleeve and glove. Pt brings in his pump that arrived in the mail. It appears to be correct item that was ordered and PT advised pt to contact the vendor, so she could teach him how to use the pump at home. He verbalizes understanding. He has met goals as noted below and will benefit from continued therapy to work towards remaining goals.      Pt prognosis is Good. Pt will continue to benefit from skilled outpatient physical therapy to address the deficits listed in the problem list chart on initial evaluation, provide pt/family education and to maximize pt's level of independence in the home and community environment.     Anticipated barriers to physical therapy: advanced disease    Goals as follows:  Short Term Goals (6 weeks)  1. Pt will demo decrease in girth in right upper extremity by >/= 2cm to allow for improved UE symmetry, clothing choice, ROM, and UE function. (Met, 1/26/23  2. Patient will demonstrate 100% knowledge of lymphedema precautions and signs of infection to allow for reduced risk of lymphedema, reduced risk of  infection, and/or exacerbation.  (met)  3. Patient will perform self lymph drainage techniques to enhance lymphatic drainage and mobility.  ( met)  4. Patient will tolerate daily activities with multilayered bandaging to enhance lymphatic drainage and skin elasticity.  (met)  5. Pt will tolerate HEP for improved strength, functional mobility, ROM, posture, and endurance. (met)        Long Term Goals: ( 12  weeks)  1. Patient to show decreased girth in right upper extremity by >/= 3cm to allow for improved UE symmetry, clothing choice, ROM, and UE function.  (progressing, not met)  2. Patient will show reduction in density to mild or less with improved contour of limb to allow for improved cosmesis, improved lymphatic drainage, and functional mobility.  (progressing, not met)  3. Patient to bruna/doff compression garment with daily compliance to support lymphatic mobility and skin elasticity.  (progressing, not met)  4. Pt to show improved postural awareness and alignment for improved functional mobility.  (progressing, not met)  5. Pt to be independent and compliant with HEP to allow for improved ROM, reach and carry with functional endurance and strength.    (progressing, not met)           Plan   Outpatient physical therapy 2x week for 4 weeks to include the following:   Manual Therapy, Neuromuscular Re-ed, Orthotic Management and Training, Patient Education, Self Care, Therapeutic Activities, and Therapeutic Exercise.     Plan of care Certification Period: 3/6/23 to 4/7/23       Therapist: Katelynn Harrell, PT  3/27/2023

## 2023-03-28 DIAGNOSIS — F43.23 ADJUSTMENT DISORDER WITH MIXED ANXIETY AND DEPRESSED MOOD: ICD-10-CM

## 2023-03-28 LAB
DNA RANGE(S) EXAMINED NAR: NORMAL
GENE DIS ANL INTERP-IMP: POSITIVE
GENE DIS ASSESSED: NORMAL
MSI CA SPEC-IMP: NOT DETECTED
REASON FOR STUDY: NORMAL
TEMPUS LCA: NORMAL
TEMPUS PORTAL: NORMAL
TEMPUS THERAPY1: NORMAL
TEMPUS THERAPY2: NORMAL
TEMPUS THERAPYCOUNT: 2
TEMPUS TRIAL1: NORMAL
TEMPUS TRIAL2: NORMAL
TEMPUS TRIAL3: NORMAL
TEMPUS TRIALCOUNT: 3

## 2023-03-28 RX ORDER — FLUOXETINE HYDROCHLORIDE 20 MG/1
40 CAPSULE ORAL DAILY
Qty: 90 CAPSULE | Refills: 1 | Status: SHIPPED | OUTPATIENT
Start: 2023-03-28 | End: 2023-05-11

## 2023-03-29 ENCOUNTER — HOSPITAL ENCOUNTER (OUTPATIENT)
Dept: RADIOLOGY | Facility: HOSPITAL | Age: 48
Discharge: HOME | End: 2023-03-29
Attending: NURSE PRACTITIONER
Payer: MEDICARE

## 2023-03-29 ENCOUNTER — PES CALL (OUTPATIENT)
Dept: ADMINISTRATIVE | Facility: CLINIC | Age: 48
End: 2023-03-29
Payer: MEDICARE

## 2023-03-29 ENCOUNTER — PATIENT MESSAGE (OUTPATIENT)
Dept: HEMATOLOGY/ONCOLOGY | Facility: CLINIC | Age: 48
End: 2023-03-29
Payer: MEDICARE

## 2023-03-29 DIAGNOSIS — G89.3 NEOPLASM RELATED PAIN: ICD-10-CM

## 2023-03-29 DIAGNOSIS — M54.9 DORSALGIA, UNSPECIFIED: ICD-10-CM

## 2023-03-29 DIAGNOSIS — C79.51 BONE METASTASES: ICD-10-CM

## 2023-03-29 DIAGNOSIS — Z17.1 MALIGNANT NEOPLASM INVOLVING BOTH NIPPLE AND AREOLA OF RIGHT BREAST IN MALE, ESTROGEN RECEPTOR NEGATIVE: Primary | ICD-10-CM

## 2023-03-29 DIAGNOSIS — C79.51 BONE METASTASES: Primary | ICD-10-CM

## 2023-03-29 DIAGNOSIS — C50.021 MALIGNANT NEOPLASM INVOLVING BOTH NIPPLE AND AREOLA OF RIGHT BREAST IN MALE, ESTROGEN RECEPTOR NEGATIVE: Primary | ICD-10-CM

## 2023-03-29 DIAGNOSIS — C50.021 MALIGNANT NEOPLASM INVOLVING BOTH NIPPLE AND AREOLA OF RIGHT BREAST IN MALE, ESTROGEN RECEPTOR NEGATIVE: ICD-10-CM

## 2023-03-29 DIAGNOSIS — Z17.1 MALIGNANT NEOPLASM INVOLVING BOTH NIPPLE AND AREOLA OF RIGHT BREAST IN MALE, ESTROGEN RECEPTOR NEGATIVE: ICD-10-CM

## 2023-03-29 PROCEDURE — 72158 MRI LUMBAR SPINE W/O & W/DYE: CPT | Mod: TC

## 2023-03-29 PROCEDURE — 25500020 PHARM REV CODE 255: Performed by: NURSE PRACTITIONER

## 2023-03-29 PROCEDURE — 72158 MRI LUMBAR SPINE W WO CONTRAST: ICD-10-PCS | Mod: 26,,, | Performed by: RADIOLOGY

## 2023-03-29 PROCEDURE — 72158 MRI LUMBAR SPINE W/O & W/DYE: CPT | Mod: 26,,, | Performed by: RADIOLOGY

## 2023-03-29 PROCEDURE — A9585 GADOBUTROL INJECTION: HCPCS | Performed by: NURSE PRACTITIONER

## 2023-03-29 PROCEDURE — 72197 MRI SACRUM/COCCYX (BONY) W WO CONTRAST: ICD-10-PCS | Mod: 26,,, | Performed by: RADIOLOGY

## 2023-03-29 PROCEDURE — 72197 MRI PELVIS W/O & W/DYE: CPT | Mod: TC

## 2023-03-29 PROCEDURE — 72197 MRI PELVIS W/O & W/DYE: CPT | Mod: 26,,, | Performed by: RADIOLOGY

## 2023-03-29 RX ORDER — LIDOCAINE 50 MG/G
1 PATCH TOPICAL DAILY
Qty: 7 PATCH | Refills: 0 | Status: CANCELLED | OUTPATIENT
Start: 2023-03-29

## 2023-03-29 RX ORDER — LIDOCAINE 50 MG/G
1 PATCH TOPICAL DAILY
Qty: 7 PATCH | Refills: 0 | Status: SHIPPED | OUTPATIENT
Start: 2023-03-29 | End: 2024-03-26 | Stop reason: SDUPTHER

## 2023-03-29 RX ORDER — GADOBUTROL 604.72 MG/ML
10 INJECTION INTRAVENOUS
Status: COMPLETED | OUTPATIENT
Start: 2023-03-29 | End: 2023-03-29

## 2023-03-29 RX ORDER — LIDOCAINE 50 MG/G
1 PATCH TOPICAL DAILY
Qty: 7 PATCH | Refills: 0 | Status: SHIPPED | OUTPATIENT
Start: 2023-03-29

## 2023-03-29 RX ADMIN — GADOBUTROL 10 ML: 604.72 INJECTION INTRAVENOUS at 05:03

## 2023-03-29 NOTE — TELEPHONE ENCOUNTER
Called to discuss. Noted to have constant pain at tip of tailbone. No trauma or injury. Started last Friday. No numbness or tingling. No trips or falls. Lidocaine patches refilled. Needs MRI lumbar and coccyx.

## 2023-03-30 ENCOUNTER — CLINICAL SUPPORT (OUTPATIENT)
Dept: REHABILITATION | Facility: HOSPITAL | Age: 48
End: 2023-03-30
Payer: MEDICARE

## 2023-03-30 DIAGNOSIS — I89.0 LYMPHEDEMA OF RIGHT ARM: Primary | ICD-10-CM

## 2023-03-30 DIAGNOSIS — Z74.09 IMPAIRED FUNCTIONAL MOBILITY AND ACTIVITY TOLERANCE: ICD-10-CM

## 2023-03-30 PROCEDURE — 97110 THERAPEUTIC EXERCISES: CPT

## 2023-03-30 PROCEDURE — 97530 THERAPEUTIC ACTIVITIES: CPT

## 2023-03-30 PROCEDURE — 97140 MANUAL THERAPY 1/> REGIONS: CPT

## 2023-03-30 NOTE — PROGRESS NOTES
Dr. Linn - please review. I called patient and he feels good today. No pain in tailbone. PT appt today for lymphedema. Results discussed.   He will call if pain returns or worsens.   JUSTYNA.

## 2023-03-30 NOTE — PROGRESS NOTES
Physical Therapy Daily Treatment Note       Date: 3/30/2023   Name: Paul Li Jr.  Clinic Number: [id number]    Therapy Diagnosis:   Encounter Diagnoses   Name Primary?    Lymphedema of right arm Yes    Impaired functional mobility and activity tolerance      Physician: Rosa Linn MD    Physician Orders: PT Eval and Treat -RUE lymphedema  Medical Diagnosis: Malignant neoplasm involving both nipple and areola of right breast in male, estrogen receptor negative [C50.021, Z17.1], Lymphedema of right upper extremity   Evaluation Date: 10/10/2022  Authorization Period Expiration: 3/30/23  Updated Plan of Care Certification Period: 4/7/23  Visit # / Visits authorized: 23 (10/12 newly authorized)   Insurance: [employer]  FOTO: 2/3     Time In: 8:03 AM   Time Out: 9:01 AM  Total Billable Time: 58 minutes     Precautions: Standard, Fall, cancer, and Spine, Bony Mets, hx of Seizures, L chest wall port      Subjective     Pt reports: He's having pain in his low back. He had an MRI yesterday afternoon. He is awaiting results. The woman from [facility] is coming tomorrow evening to show him how to use the pump. Removed bandages last night.  He was compliant with self manual lymph drainage  Response to previous treatment: no adverse reaction  Pain Scale: Paul rates pain on a scale of 5/10 on VAS.   Pain location: low back to tailbone    Fatigue: none reported  Functional change: none stated  Treatment:   Chemotherapy: te-adjuvant chemo therapy completed 10/19  Pt is currently on chemotherapy: pt is getting 1 infusion/week for 3 weeks with 1 week break, and he takes oral chemotherapy daily.  Radiation: Completed in February 2020  Surgery date: 12/17/19 pt underwent right axillary lymph node dissection and resection excess lateral tissue; 11/22/19 right simple mastectomy with SLNB; total of 18 lymph nodes removed      Objective     Objective Measures  "updated at progress report unless specified.     LANDMARK RIGHT UE LEFT UE DIFF RUE Diff RUE Diff RUE Diff RUE Diff RUE Diff RUE Diff RUE Diff RUE Diff RUE Diff RUE Diff RUE Diff RUE Diff RUE Diff RUE Diff RUE Diff   Date Eval Eval Eval 10/17/22 10/17/22 10/29/22 10/19/22 11/2/22 11/2/22 11/11/22 11/11/22 11/29/22 11/29/22 12/5/22 12/5/22 12/13/22 12/13/22 12/21/22 12/21/22 12/30/22 12/30/22 1/6/23 1/6/23 1/26/23 1/26/23 2/6/23 2/6/23 3/6/23 3/6/23 3/23/23 3/23/23 3/30/23 3/30/23   E + 8" 50 cm 46.5 cm 3.5 cm 48 cm -2 47.5 cm -0.5 50.5 cm +3 47.5 cm -3 49.5 cm +2 50.5 cm +1 48.5 cm -2 49 cm +0.5 48 cm -1 50 cm +2 48.5 cm -1.5 49 cm +0.5 49 cm No chg 47 cm -2 49 cm +2   E + 6" 45.5 cm 44 cm 1.5 cm 45 cm -0.5 46.5 cm +1.5 47 cm +0.5 45 cm -2 45.5 cm +0.5 46 cm +0.5 45 cm -1 45.5 cm +0.5 45.5 cm No chg 47 cm +1.5 45 cm -2 45.5 cm +0.5 46.5 cm +1 44 cm -2.5 46.5 cm +2.5   E + 4" 40 cm 40.5 cm 0.5 cm 40.5 cm +0.5 40.5cm No chg 41 cm +0.5 40.5 cm -0.5 40.5 cm No chg 40 cm -0.5 41.5 cm +1.5  41 cm -0.5 41 cm No chg 42.5 cm +1.5 40 cm -2.5 41.5 cm +1.5 41.5 cm No chg 41 cm -0.5 41.5 cm +0.5   E + 2" 38.5 cm 38.5 cm 0 cm 39 cm +0.5 40 cm +1 39.5 cm -0.5 38.5 cm -1 38 cm -0.5 39 cm +1 39 cm No chg 39 cm No chg 39 cm No chg 39.5 cm +0.5 38.5 cm -1 37.5 cm -1 38 cm +0.5 38 cm No chg 39.5 cm +1.5   Elbow 36.5 cm 34 cm 2.5 cm 35.5 cm -1 35.5 cm No chg 36 cm +0.5 34.5 cm -1.5 36 cm +1.5 36 cm No chg 35 cm -1 36 cm +1 35.5 cm -0.5 37.5 cm +2 35 cm -2.5 35 cm No chg 34 cm -1 34.5 cm +0.5 35 cm +0.5   W+ 8" 37.5cm 33 cm 4.5 cm 38cm +0.5 36.5 cm -1.5 37.5 cm +1 36.5 cm -1 37.5 cm +1 37.5 cm No chg 37 cm -0.5 37 cm No chg 37 cm No chg 38 cm +1 37 cm -1 37.5 cm +0.5 36.5 cm -1 36.5 cm No chg 37 cm +0.5   W +  6" 35 cm 29.5 cm 5.5 cm 34 cm -1 34.5 cm +0.5 33.5 cm -1 32.5 cm -1 33 cm +0.5 33 cm No chg 33cm No chg 34.5 cm +1.5 34.5 cm No chg 36 cm +1.5 33.5 cm -2.5 34.5 cm +1 34 cm -0.5 33 cm -1 33 cm No chg   W + 4" 31.5 cm 25 cm 6.5 cm " 31.5 cm No chg 31.5 cm No chg 29.5 cm -2 29 cm -0.5 30 cm +1 28 cm -2 29 cm +1 29 cm No chg 29.5 cm +0.5 32 cm +2.5 30 cm -2 30 cm No chg 29.5 cm -0.5 29 cm +0.5 28 cm -1   Wrist 23 cm 20 cm 3 cm 22.5cm -0.5 21.5 cm -1 23 cm +1.5 21 cm -2 24 cm +3 22 cm -2 22 cm No chg 22.5 cm +0.5 23 cm +0.5 23 cm No chg 22 cm -1 21 cm -1 21.5 cm +0.5 22.5 cm +1 21.5 cm -1   DPC 28.5 cm 26 cm 2.5 cm 28 cm -0.5 27.5cm -0.5 28.5 cm +1 27 cm -1.5 28 cm +1 27 cm -1 26.5 cm -0.5 28 cm +1.5 27.5 cm -0.5 27 cm -0.5 28 cm +1 25.5 cm -2.5 26 cm +0.5 25.5 cm -0.5 26 cm +0.5   IP Thumb 8.5 cm 8 cm 0.5 cm 8.5 cm No chg 8.5 cm No chg 9 cm +0.5 8.5 cm -0.5 8.5 cm No chg 9 cm +0.5 8.5 cm -0.5 8.5 cm No chg 8.5 cm No chg 9 cm +0.5 9 cm No chg 8.5 cm -0.5 9 cm +0.5 9 cm No chg 8.5 cm -0.5        Pt's pump appears to be correct/appropriate and what the therapist ordered.    Treatment         Paul received the following manual therapy techniques were performed to increased myofascial/soft tissue length, mobility and pliability, increase PROM, AROM and function as well as to decrease pain for 38 minutes      Diaphragmatic breathing x 5 reps    Short Neck- held due to history of seizures  Clear Healthy Lymph Nodes:  Left Axilla                                                  Right Groin  Open Anastomoses:  Anterior Axillo-Axillary  (AAA)                                    Axillo-Inguinal     (AI)                                    Posterior Axillo-Axillary  (GEREMIAS) -not performed today    Right Upper Arm (Lateral and Medial)  Right  Antecubital Fossa  Right Dorsal Forearm  Right Volar Forearm  Dorsum Right Hand  Right fingers     Rework Right UE  Rework Anastomoses  Rework Healthy lymph Nodes      PT applies gauze to right fingers and hand, stockinette, cotton artiflex, and short stretch bandages to pt's RIGHT/LEFT/BILATERAL: right upper extremity.  Pt educated to wear bandaging until next session unless it causes pain, numbness, or changes in skin  color/temperature, or if they start to fall down.  If removed, pt instructed to roll up bandages and cotton padding and bring to next session. If bandages are removed, pt can wear his tubigrip.  Pt has Vet glove to cover bandages in the shower, and we reviewed directions on how to care for bandages. Pt verbalizes understanding of all topics.    Paul received therapeutic exercises to develop ROM, posture, core stabilization, and lymphatic motility for 12 minutes including:     Diaphragmatic breathing, 2 x 5 reps  Head turns x 5 reps  Head tilts x 5 reps    Reviewed lifting restrictions with spine precautions and how he should adjust his resistance exercises while maintaining not lifting heavier than 5-10 lbs.  Explained that standard push-ups will likely not be appropriate given spine precautions  Discussed potential benefit of walking in pool/pool exercises, but that pt will need clearance from Dr. Linn re: port and water.       Pt participated in therapeutic activity, 1:1 for 8 minutes for improved functional mobility:    Reviewed spine precautions and using log rolling technique for bed mobility and side-lying <> sit transfers.      Home Exercise Program and Patient Education   Education provided re:  - role of PT in multi - disciplinary team, goals for PT  - progress towards goals         Written Home Exercises Provided: Patient instructed to cont prior HEP.  Exercises were reviewed and Paul was able to demonstrate them prior to the end of the session.  Paul demonstrated good  understanding of the education provided.     See EMR under Media for self lymphatic drainage provided prior visit.    Pt has no cultural, educational or language barriers to learning provided.    Assessment     Patient tolerated session well.  He tolerated complete decongestive therapy well. Once his measurements stabilize, he will benefit from a compression sleeve and glove. He reports increased back pain and had a spine MRI yesterday  afternoon, results pending. Spent increased time reviewing spine precautions and how to implement for transfers, mobility, and exercise. He verbalizes and demo's good understanding, but he may need review. Will follow up at next session and continue/progress as tolerated.    Pt prognosis is Good. Pt will continue to benefit from skilled outpatient physical therapy to address the deficits listed in the problem list chart on initial evaluation, provide pt/family education and to maximize pt's level of independence in the home and community environment.     Anticipated barriers to physical therapy: advanced disease    Goals as follows:  Short Term Goals (6 weeks)  1. Pt will demo decrease in girth in right upper extremity by >/= 2cm to allow for improved UE symmetry, clothing choice, ROM, and UE function. (Met, 1/26/23  2. Patient will demonstrate 100% knowledge of lymphedema precautions and signs of infection to allow for reduced risk of lymphedema, reduced risk of infection, and/or exacerbation.  (met)  3. Patient will perform self lymph drainage techniques to enhance lymphatic drainage and mobility.  ( met)  4. Patient will tolerate daily activities with multilayered bandaging to enhance lymphatic drainage and skin elasticity.  (met)  5. Pt will tolerate HEP for improved strength, functional mobility, ROM, posture, and endurance. (met)        Long Term Goals: ( 12  weeks)  1. Patient to show decreased girth in right upper extremity by >/= 3cm to allow for improved UE symmetry, clothing choice, ROM, and UE function.  (progressing, not met)  2. Patient will show reduction in density to mild or less with improved contour of limb to allow for improved cosmesis, improved lymphatic drainage, and functional mobility.  (progressing, not met)  3. Patient to bruna/doff compression garment with daily compliance to support lymphatic mobility and skin elasticity.  (progressing, not met)  4. Pt to show improved postural awareness  and alignment for improved functional mobility.  (progressing, not met)  5. Pt to be independent and compliant with HEP to allow for improved ROM, reach and carry with functional endurance and strength.    (progressing, not met)           Plan   Outpatient physical therapy 2x week for 4 weeks to include the following:   Manual Therapy, Neuromuscular Re-ed, Orthotic Management and Training, Patient Education, Self Care, Therapeutic Activities, and Therapeutic Exercise.     Plan of care Certification Period: 3/6/23 to 4/7/23       Therapist: Katelynn Harrell, PT  3/30/2023

## 2023-04-03 ENCOUNTER — CLINICAL SUPPORT (OUTPATIENT)
Dept: REHABILITATION | Facility: HOSPITAL | Age: 48
End: 2023-04-03
Payer: MEDICARE

## 2023-04-03 DIAGNOSIS — Z74.09 IMPAIRED FUNCTIONAL MOBILITY AND ACTIVITY TOLERANCE: ICD-10-CM

## 2023-04-03 DIAGNOSIS — I89.0 LYMPHEDEMA OF RIGHT ARM: Primary | ICD-10-CM

## 2023-04-03 PROCEDURE — 97530 THERAPEUTIC ACTIVITIES: CPT

## 2023-04-03 PROCEDURE — 97110 THERAPEUTIC EXERCISES: CPT

## 2023-04-03 PROCEDURE — 97140 MANUAL THERAPY 1/> REGIONS: CPT

## 2023-04-03 NOTE — PROGRESS NOTES
Physical Therapy Daily Treatment Note       Date: 4/3/2023   Name: Paul Li Jr.  Clinic Number: [id number]    Therapy Diagnosis:   Encounter Diagnoses   Name Primary?    Lymphedema of right arm Yes    Impaired functional mobility and activity tolerance      Physician: Rosa Linn MD    Physician Orders: PT Eval and Treat -RUE lymphedema  Medical Diagnosis: Malignant neoplasm involving both nipple and areola of right breast in male, estrogen receptor negative [C50.021, Z17.1], Lymphedema of right upper extremity   Evaluation Date: 10/10/2022  Authorization Period Expiration: 3/30/23  Updated Plan of Care Certification Period: 4/7/23  Visit # / Visits authorized: 24 (11/12 newly authorized)   Insurance: [employer]  FOTO: 2/3     Time In: 8:03 AM   Time Out: 9:03 AM  Total Billable Time: 60 minutes     Precautions: Standard, Fall, cancer, and Spine, Bony Mets, hx of Seizures, L chest wall port      Subjective     Pt reports: He's been using the Lympha Press, and it feels great. He's getting a new chest component because his current one is a little tight. He's using it 1 hour/day, but he states it makes the bandages loose. He said [facility] also has a glove and sleeve they recommend for him.   He was compliant with self manual lymph drainage  Response to previous treatment: no adverse reaction  Pain Scale: Paul rates pain on a scale of 2/10 on VAS.   Pain location: low back to tailbone    Fatigue: none reported  Functional change: none stated  Treatment:   Chemotherapy: te-adjuvant chemo therapy completed 10/19  Pt is currently on chemotherapy: pt is getting 1 infusion/week for 3 weeks with 1 week break, and he takes oral chemotherapy daily.  Radiation: Completed in February 2020  Surgery date: 12/17/19 pt underwent right axillary lymph node dissection and resection excess lateral tissue; 11/22/19 right simple mastectomy with SLNB; total of 18  "lymph nodes removed      Objective     Objective Measures updated at progress report unless specified.     LANDMARK RIGHT UE LEFT UE DIFF RUE Diff RUE Diff RUE Diff RUE Diff RUE Diff RUE Diff RUE Diff RUE Diff RUE Diff RUE Diff RUE Diff RUE Diff RUE Diff RUE Diff RUE Diff RUE Diff   Date Eval Eval Eval 10/17/22 10/17/22 10/29/22 10/19/22 11/2/22 11/2/22 11/11/22 11/11/22 11/29/22 11/29/22 12/5/22 12/5/22 12/13/22 12/13/22 12/21/22 12/21/22 12/30/22 12/30/22 1/6/23 1/6/23 1/26/23 1/26/23 2/6/23 2/6/23 3/6/23 3/6/23 3/23/23 3/23/23 3/30/23 3/30/23 4/3/23 4/3/23   E + 8" 50 cm 46.5 cm 3.5 cm 48 cm -2 47.5 cm -0.5 50.5 cm +3 47.5 cm -3 49.5 cm +2 50.5 cm +1 48.5 cm -2 49 cm +0.5 48 cm -1 50 cm +2 48.5 cm -1.5 49 cm +0.5 49 cm No chg 47 cm -2 49 cm +2 48.5 cm -0.5   E + 6" 45.5 cm 44 cm 1.5 cm 45 cm -0.5 46.5 cm +1.5 47 cm +0.5 45 cm -2 45.5 cm +0.5 46 cm +0.5 45 cm -1 45.5 cm +0.5 45.5 cm No chg 47 cm +1.5 45 cm -2 45.5 cm +0.5 46.5 cm +1 44 cm -2.5 46.5 cm +2.5 44.5 cm -2   E + 4" 40 cm 40.5 cm 0.5 cm 40.5 cm +0.5 40.5cm No chg 41 cm +0.5 40.5 cm -0.5 40.5 cm No chg 40 cm -0.5 41.5 cm +1.5  41 cm -0.5 41 cm No chg 42.5 cm +1.5 40 cm -2.5 41.5 cm +1.5 41.5 cm No chg 41 cm -0.5 41.5 cm +0.5 41.5 cm No chg   E + 2" 38.5 cm 38.5 cm 0 cm 39 cm +0.5 40 cm +1 39.5 cm -0.5 38.5 cm -1 38 cm -0.5 39 cm +1 39 cm No chg 39 cm No chg 39 cm No chg 39.5 cm +0.5 38.5 cm -1 37.5 cm -1 38 cm +0.5 38 cm No chg 39.5 cm +1.5 37.5 cm -2   Elbow 36.5 cm 34 cm 2.5 cm 35.5 cm -1 35.5 cm No chg 36 cm +0.5 34.5 cm -1.5 36 cm +1.5 36 cm No chg 35 cm -1 36 cm +1 35.5 cm -0.5 37.5 cm +2 35 cm -2.5 35 cm No chg 34 cm -1 34.5 cm +0.5 35 cm +0.5 36 cm +1   W+ 8" 37.5cm 33 cm 4.5 cm 38cm +0.5 36.5 cm -1.5 37.5 cm +1 36.5 cm -1 37.5 cm +1 37.5 cm No chg 37 cm -0.5 37 cm No chg 37 cm No chg 38 cm +1 37 cm -1 37.5 cm +0.5 36.5 cm -1 36.5 cm No chg 37 cm +0.5 37 cm No chg   W +  6" 35 cm 29.5 cm 5.5 cm 34 cm -1 34.5 cm +0.5 33.5 cm -1 32.5 cm -1 33 cm +0.5 " "33 cm No chg 33cm No chg 34.5 cm +1.5 34.5 cm No chg 36 cm +1.5 33.5 cm -2.5 34.5 cm +1 34 cm -0.5 33 cm -1 33 cm No chg 34 cm +1   W + 4" 31.5 cm 25 cm 6.5 cm 31.5 cm No chg 31.5 cm No chg 29.5 cm -2 29 cm -0.5 30 cm +1 28 cm -2 29 cm +1 29 cm No chg 29.5 cm +0.5 32 cm +2.5 30 cm -2 30 cm No chg 29.5 cm -0.5 29 cm +0.5 28 cm -1 29.5 cm +1.5   Wrist 23 cm 20 cm 3 cm 22.5cm -0.5 21.5 cm -1 23 cm +1.5 21 cm -2 24 cm +3 22 cm -2 22 cm No chg 22.5 cm +0.5 23 cm +0.5 23 cm No chg 22 cm -1 21 cm -1 21.5 cm +0.5 22.5 cm +1 21.5 cm -1 21cm -0.5   DPC 28.5 cm 26 cm 2.5 cm 28 cm -0.5 27.5cm -0.5 28.5 cm +1 27 cm -1.5 28 cm +1 27 cm -1 26.5 cm -0.5 28 cm +1.5 27.5 cm -0.5 27 cm -0.5 28 cm +1 25.5 cm -2.5 26 cm +0.5 25.5 cm -0.5 26 cm +0.5 26 cm No chg   IP Thumb 8.5 cm 8 cm 0.5 cm 8.5 cm No chg 8.5 cm No chg 9 cm +0.5 8.5 cm -0.5 8.5 cm No chg 9 cm +0.5 8.5 cm -0.5 8.5 cm No chg 8.5 cm No chg 9 cm +0.5 9 cm No chg 8.5 cm -0.5 9 cm +0.5 9 cm No chg 8.5 cm -0.5 8 cm -0.5          Treatment         Paul received the following manual therapy techniques were performed to increased myofascial/soft tissue length, mobility and pliability, increase PROM, AROM and function as well as to decrease pain for 42 minutes    Measurements taken above.      Short Neck- held due to history of seizures  Clear Healthy Lymph Nodes:  Left Axilla                                                  Right Groin  Open Anastomoses:  Anterior Axillo-Axillary  (AAA)                                    Axillo-Inguinal     (AI)                                    Posterior Axillo-Axillary  (GEREMIAS) -not performed today    Right Upper Arm (Lateral and Medial)  Right  Antecubital Fossa  Right Dorsal Forearm  Right Volar Forearm  Dorsum Right Hand  Right fingers     Rework Right UE  Rework Anastomoses, including GEREMIAS  Rework Healthy lymph Nodes      PT applies gauze to right fingers and hand, stockinette, cotton artiflex, and short stretch bandages to pt's " RIGHT/LEFT/BILATERAL: right upper extremity.  Pt educated to wear bandaging until next session unless it causes pain, numbness, or changes in skin color/temperature, or if they start to fall down.  If removed, pt instructed to roll up bandages and cotton padding and bring to next session. If bandages are removed, pt can wear his tubigrip.  Pt has Vet glove to cover bandages in the shower, and we reviewed directions on how to care for bandages. Pt verbalizes understanding of all topics.    Paul received therapeutic exercises to develop ROM, posture, core stabilization, and lymphatic motility for 10 minutes including:     Diaphragmatic breathing, 2 x 5 reps  Head turns x 5 reps    Demo'd options for supine hamstring stretch and single knee to chest stretch, so that pt has options for LE stretched that do not strain his low back/allow him to maintain spine precautions. Pt verbalizes understanding.      Pt participated in therapeutic activity, 1:1 for 8 minutes for improved functional mobility:    Reviewed spine precautions and how to apply them to activities such as picking up items off the floor, caring for his dogs, and holding his grandson while seated rather than standing, etc.      Home Exercise Program and Patient Education   Education provided re:  - role of PT in multi - disciplinary team, goals for PT  - progress towards goals         Written Home Exercises Provided: Patient instructed to cont prior HEP.  Exercises were reviewed and Paul was able to demonstrate them prior to the end of the session.  Paul demonstrated good  understanding of the education provided.     See EMR under Media for self lymphatic drainage provided prior visit.    Pt has no cultural, educational or language barriers to learning provided.    Assessment     Patient tolerated session well. He has a mix of increases and decreases in his arm circumference. He has been using the compression pump at home and feels it is helping. He tolerated  complete decongestive therapy well. Reviewed how to apply spine precautions to different daily activities. He asked for leg stretches that were safe to do, so therapist provided 2 examples (supine hamstring stretch with strap and supine single knee to chest using a towel). Pt advised to stop if either cause pain. Pt with good understanding. Once his measurements stabilize, he will benefit from a compression sleeve and glove.  Will follow up at next session and continue/progress as tolerated. May consider self-bandaging review/education.    Pt prognosis is Good. Pt will continue to benefit from skilled outpatient physical therapy to address the deficits listed in the problem list chart on initial evaluation, provide pt/family education and to maximize pt's level of independence in the home and community environment.     Anticipated barriers to physical therapy: advanced disease    Goals as follows:  Short Term Goals (6 weeks)  1. Pt will demo decrease in girth in right upper extremity by >/= 2cm to allow for improved UE symmetry, clothing choice, ROM, and UE function. (Met, 1/26/23  2. Patient will demonstrate 100% knowledge of lymphedema precautions and signs of infection to allow for reduced risk of lymphedema, reduced risk of infection, and/or exacerbation.  (met)  3. Patient will perform self lymph drainage techniques to enhance lymphatic drainage and mobility.  ( met)  4. Patient will tolerate daily activities with multilayered bandaging to enhance lymphatic drainage and skin elasticity.  (met)  5. Pt will tolerate HEP for improved strength, functional mobility, ROM, posture, and endurance. (met)        Long Term Goals: ( 12  weeks)  1. Patient to show decreased girth in right upper extremity by >/= 3cm to allow for improved UE symmetry, clothing choice, ROM, and UE function.  (progressing, not met)  2. Patient will show reduction in density to mild or less with improved contour of limb to allow for improved  cosmesis, improved lymphatic drainage, and functional mobility.  (progressing, not met)  3. Patient to bruna/doff compression garment with daily compliance to support lymphatic mobility and skin elasticity.  (progressing, not met)  4. Pt to show improved postural awareness and alignment for improved functional mobility.  (progressing, not met)  5. Pt to be independent and compliant with HEP to allow for improved ROM, reach and carry with functional endurance and strength.    (progressing, not met)           Plan   Outpatient physical therapy 2x week for 4 weeks to include the following:   Manual Therapy, Neuromuscular Re-ed, Orthotic Management and Training, Patient Education, Self Care, Therapeutic Activities, and Therapeutic Exercise.     Plan of care Certification Period: 3/6/23 to 4/7/23       Therapist: Katelynn Harrell, PT  4/3/2023

## 2023-04-04 ENCOUNTER — SPECIALTY PHARMACY (OUTPATIENT)
Dept: PHARMACY | Facility: CLINIC | Age: 48
End: 2023-04-04
Payer: MEDICARE

## 2023-04-04 NOTE — TELEPHONE ENCOUNTER
Specialty Pharmacy - Clinical Reassessment      Patient Diagnosis   C50.021, Z17.1 - Malignant neoplasm involving both nipple and areola of right breast in male, estrogen receptor negative  C79.51 - Malignant neoplasm metastatic to bone    Paul Li Jr. is a 47 y.o. male, who is followed by the specialty pharmacy service for management and education of his Piqray.  He has been on therapy with Piqray for 20 months.  I have reviewed his electronic medical record and current medication list and determined that specialty medication adjustment Is not needed at this time.    Patient has not experienced adverse events.  He Is adherent reporting 0 missed doses since last review.  Adherence has been encouraged with the following mechanism(s): habit.  He is on target to meet goals of therapy and it is unknown if patient will continue treatment.        3/10/2023 2/8/2023 1/3/2023 12/5/2022 11/4/2022 10/4/2022 9/9/2022   Follow Up Review   # of missed doses 0 0 0 0 0 0 0   New Medications? No No No No No No No   New Conditions? No No No No No No No   New Allergies? No No No No No No No   Med Effective? Good Good Good Good Good Very good Good   Urgent Care? No No No No No No No   Requested Pharmacist? No No No No No No No            Therapy is appropriate to hold.    Therapy is effective: Yes  On scale of 1 to 10, how does patient rank quality of life? (10 - Best): Unable to Assess  Recommendations:  Follow up with provider to assess status of Pqray  Review Method: Chart Review    Tasks added this encounter   No tasks added.   Tasks due within next 3 months   4/3/2023 - Clinical - Follow Up Assesement (180 day)  4/5/2023 - Refill Call (Auto Added)     Delfin Wood, [facility]  [address]  [facility] [address]  Phone: [phone]  Fax: [phone]

## 2023-04-04 NOTE — TELEPHONE ENCOUNTER
Outgoing call to patient for follow up on Piqray. It appears the medication was discontinued by the provider. Need to follow up with patient to assess the status of the medication. Provider messaged.

## 2023-04-05 ENCOUNTER — TELEPHONE (OUTPATIENT)
Dept: PSYCHIATRY | Facility: CLINIC | Age: 48
End: 2023-04-05
Payer: MEDICARE

## 2023-04-05 ENCOUNTER — PATIENT MESSAGE (OUTPATIENT)
Dept: PSYCHIATRY | Facility: CLINIC | Age: 48
End: 2023-04-05
Payer: MEDICARE

## 2023-04-05 NOTE — TELEPHONE ENCOUNTER
"Spoke to patient by phone due to chart message. Patient reports increased anxiety, depression and agitation since learning of his disease progression yesterday. He has been struggling to sleep due to pain for the past week and last night had additional difficulty due to worry. He is fearful that "there are no more answers for me" and "what if the team gives up?" He has not spoken to his family about his fears (wishes to wait until he gets more information from Dr. Linn). He denies suicidal ideation/intent/plan.  He is still going to work as scheduled (enjoying his new job).     Discussed coping skills: neutral self-talk, here and now focus, breathing exercises for anxiety reduction.    Patient does not have refills on his trazodone (which was helping him sleep in the past, has not needed it lately). I will communicate with his palliative team about this.    Patient will call again, if needed. He is aware he can call [phone] or go to any ED if suicidality occurs.    GENNARO Sandoval, PhD    "

## 2023-04-06 ENCOUNTER — CLINICAL SUPPORT (OUTPATIENT)
Dept: REHABILITATION | Facility: HOSPITAL | Age: 48
End: 2023-04-06
Payer: MEDICARE

## 2023-04-06 DIAGNOSIS — Z17.1 MALIGNANT NEOPLASM INVOLVING BOTH NIPPLE AND AREOLA OF RIGHT BREAST IN MALE, ESTROGEN RECEPTOR NEGATIVE: ICD-10-CM

## 2023-04-06 DIAGNOSIS — I89.0 LYMPHEDEMA OF RIGHT ARM: Primary | ICD-10-CM

## 2023-04-06 DIAGNOSIS — G47.00 INSOMNIA, UNSPECIFIED TYPE: ICD-10-CM

## 2023-04-06 DIAGNOSIS — C50.021 MALIGNANT NEOPLASM INVOLVING BOTH NIPPLE AND AREOLA OF RIGHT BREAST IN MALE, ESTROGEN RECEPTOR NEGATIVE: ICD-10-CM

## 2023-04-06 DIAGNOSIS — Z74.09 IMPAIRED FUNCTIONAL MOBILITY AND ACTIVITY TOLERANCE: ICD-10-CM

## 2023-04-06 PROCEDURE — 97140 MANUAL THERAPY 1/> REGIONS: CPT

## 2023-04-06 PROCEDURE — 97110 THERAPEUTIC EXERCISES: CPT

## 2023-04-06 RX ORDER — TRAZODONE HYDROCHLORIDE 100 MG/1
100 TABLET ORAL NIGHTLY
Qty: 30 TABLET | Refills: 0 | Status: SHIPPED | OUTPATIENT
Start: 2023-04-06 | End: 2023-05-11 | Stop reason: SDUPTHER

## 2023-04-06 NOTE — PLAN OF CARE
"  Outpatient Therapy Updated Plan of Care     Visit Date: 4/6/2023  Name: Paul Li Jr.  Clinic Number: [id number]    Therapy Diagnosis:   Encounter Diagnoses   Name Primary?    Lymphedema of right arm Yes    Impaired functional mobility and activity tolerance      Physician: Rosa Linn MD    Physician Orders: PT Eval and Treat -RUE lymphedema  Medical Diagnosis: Malignant neoplasm involving both nipple and areola of right breast in male, estrogen receptor negative [C50.021, Z17.1], Lymphedema of right upper extremity   Evaluation Date: 10/10/2022    Total Visits Received: 25  Cancelled Visits: unknown  No Show Visits: unknown    Current Certification Period: 2/10/23 to 4/7/23  Precautions:  Standard, Cancer, Spine, bony mets, history of seizures, RUE lymphedema  Visits from Evaluation Date:  24      Subjective     Update:   Pt reports: His compression pump has been working well, but it makes the bandages come down.   He was compliant with self manual lymph drainage  Response to previous treatment: no adverse reaction  Pain Scale: Paul rates pain on a scale of 0/10 on VAS.   Pain location: low back to tailbone     Fatigue: none reported  Functional change: none stated    Objective     Update:     Right arm appears smaller today, 4/6/23    LANDMARK RIGHT UE LEFT UE DIFF RUE Diff RUE Diff RUE Diff RUE Diff RUE Diff RUE Diff RUE Diff RUE Diff RUE Diff RUE Diff RUE Diff RUE Diff RUE Diff RUE Diff RUE Diff RUE Diff   Date Eval Eval Eval 10/17/22 10/17/22 10/29/22 10/19/22 11/2/22 11/2/22 11/11/22 11/11/22 11/29/22 11/29/22 12/5/22 12/5/22 12/13/22 12/13/22 12/21/22 12/21/22 12/30/22 12/30/22 1/6/23 1/6/23 1/26/23 1/26/23 2/6/23 2/6/23 3/6/23 3/6/23 3/23/23 3/23/23 3/30/23 3/30/23 4/3/23 4/3/23   E + 8" 50 cm 46.5 cm 3.5 cm 48 cm -2 47.5 cm -0.5 50.5 cm +3 47.5 cm -3 49.5 cm +2 50.5 cm +1 48.5 cm -2 49 cm +0.5 48 cm -1 50 cm +2 48.5 cm -1.5 49 cm +0.5 49 cm No chg 47 cm -2 49 cm +2 48.5 cm -0.5   E + 6" 45.5 cm 44 cm " "1.5 cm 45 cm -0.5 46.5 cm +1.5 47 cm +0.5 45 cm -2 45.5 cm +0.5 46 cm +0.5 45 cm -1 45.5 cm +0.5 45.5 cm No chg 47 cm +1.5 45 cm -2 45.5 cm +0.5 46.5 cm +1 44 cm -2.5 46.5 cm +2.5 44.5 cm -2   E + 4" 40 cm 40.5 cm 0.5 cm 40.5 cm +0.5 40.5cm No chg 41 cm +0.5 40.5 cm -0.5 40.5 cm No chg 40 cm -0.5 41.5 cm +1.5  41 cm -0.5 41 cm No chg 42.5 cm +1.5 40 cm -2.5 41.5 cm +1.5 41.5 cm No chg 41 cm -0.5 41.5 cm +0.5 41.5 cm No chg   E + 2" 38.5 cm 38.5 cm 0 cm 39 cm +0.5 40 cm +1 39.5 cm -0.5 38.5 cm -1 38 cm -0.5 39 cm +1 39 cm No chg 39 cm No chg 39 cm No chg 39.5 cm +0.5 38.5 cm -1 37.5 cm -1 38 cm +0.5 38 cm No chg 39.5 cm +1.5 37.5 cm -2   Elbow 36.5 cm 34 cm 2.5 cm 35.5 cm -1 35.5 cm No chg 36 cm +0.5 34.5 cm -1.5 36 cm +1.5 36 cm No chg 35 cm -1 36 cm +1 35.5 cm -0.5 37.5 cm +2 35 cm -2.5 35 cm No chg 34 cm -1 34.5 cm +0.5 35 cm +0.5 36 cm +1   W+ 8" 37.5cm 33 cm 4.5 cm 38cm +0.5 36.5 cm -1.5 37.5 cm +1 36.5 cm -1 37.5 cm +1 37.5 cm No chg 37 cm -0.5 37 cm No chg 37 cm No chg 38 cm +1 37 cm -1 37.5 cm +0.5 36.5 cm -1 36.5 cm No chg 37 cm +0.5 37 cm No chg   W +  6" 35 cm 29.5 cm 5.5 cm 34 cm -1 34.5 cm +0.5 33.5 cm -1 32.5 cm -1 33 cm +0.5 33 cm No chg 33cm No chg 34.5 cm +1.5 34.5 cm No chg 36 cm +1.5 33.5 cm -2.5 34.5 cm +1 34 cm -0.5 33 cm -1 33 cm No chg 34 cm +1   W + 4" 31.5 cm 25 cm 6.5 cm 31.5 cm No chg 31.5 cm No chg 29.5 cm -2 29 cm -0.5 30 cm +1 28 cm -2 29 cm +1 29 cm No chg 29.5 cm +0.5 32 cm +2.5 30 cm -2 30 cm No chg 29.5 cm -0.5 29 cm +0.5 28 cm -1 29.5 cm +1.5   Wrist 23 cm 20 cm 3 cm 22.5cm -0.5 21.5 cm -1 23 cm +1.5 21 cm -2 24 cm +3 22 cm -2 22 cm No chg 22.5 cm +0.5 23 cm +0.5 23 cm No chg 22 cm -1 21 cm -1 21.5 cm +0.5 22.5 cm +1 21.5 cm -1 21cm -0.5   DPC 28.5 cm 26 cm 2.5 cm 28 cm -0.5 27.5cm -0.5 28.5 cm +1 27 cm -1.5 28 cm +1 27 cm -1 26.5 cm -0.5 28 cm +1.5 27.5 cm -0.5 27 cm -0.5 28 cm +1 25.5 cm -2.5 26 cm +0.5 25.5 cm -0.5 26 cm +0.5 26 cm No chg   IP Thumb 8.5 cm 8 cm 0.5 cm 8.5 cm No " chg 8.5 cm No chg 9 cm +0.5 8.5 cm -0.5 8.5 cm No chg 9 cm +0.5 8.5 cm -0.5 8.5 cm No chg 8.5 cm No chg 9 cm +0.5 9 cm No chg 8.5 cm -0.5 9 cm +0.5 9 cm No chg 8.5 cm -0.5 8 cm -0.5                 Assessment     Update:   Patient tolerated session well. Session limited due to late arrival. He has been using the compression pump at home and feels it is helping. His arm appears smaller today, especially his hand. He tolerated complete decongestive therapy well. Reviewed how to apply spine precautions to exercises. Once his measurements stabilize, he will benefit from a compression sleeve and glove.  Will follow up at next session and continue/progress as tolerated. May consider self-bandaging review/education. Pt has met goals as noted below. Will progress as tolerated working towards remaining goals with emphasis on getting compression sleeve and glove.    Previous Short Term Goals Status:   met partially  New Short Term Goals Status:   N/A  Long Term Goal Status:   continue per initial plan of care.  Reasons for Recertification of Therapy:  pt has met majority of goals, but his arm circumference continues to fluctuate.  Once he is appropriate to be measured/fit for compression garments and receives appropriate garment, he will be appropriate for discharge.    Plan     Updated Certification Period: 4/6/2023 to 5/5/23  Recommended Treatment Plan: 2 times per week for 4 weeks: Manual Therapy, Neuromuscular Re-ed, Orthotic Management and Training, Patient Education, Self Care, Therapeutic Activities, and Therapeutic Exercise  Other Recommendations: none anticipated    Katelynn Harrell, PT  4/6/2023      I CERTIFY THE NEED FOR THESE SERVICES FURNISHED UNDER THIS PLAN OF TREATMENT AND WHILE UNDER MY CARE    Physician's comments:        Physician's Signature: ___________________________________________________

## 2023-04-06 NOTE — PROGRESS NOTES
Physical Therapy Daily Treatment Note       Date: 4/6/2023   Name: Paul Li Jr.  Clinic Number: [id number]    Therapy Diagnosis:   Encounter Diagnoses   Name Primary?    Lymphedema of right arm Yes    Impaired functional mobility and activity tolerance      Physician: Rosa Linn MD    Physician Orders: PT Eval and Treat -RUE lymphedema  Medical Diagnosis: Malignant neoplasm involving both nipple and areola of right breast in male, estrogen receptor negative [C50.021, Z17.1], Lymphedema of right upper extremity   Evaluation Date: 10/10/2022  Authorization Period Expiration: 3/30/23  Updated Plan of Care Certification Period: 5/5/23  Visit # / Visits authorized: 25 (12/12 newly authorized)   Insurance: [employer]  FOTO: 2/3     Time In: 8:19 AM   Time Out: 9:07 AM  Total Billable Time: 48 minutes     Precautions: Standard, Fall, cancer, and Spine, Bony Mets, hx of Seizures, L chest wall port      Subjective     Pt reports: His compression pump has been working well, but it makes the bandages come down.   He was compliant with self manual lymph drainage  Response to previous treatment: no adverse reaction  Pain Scale: Paul rates pain on a scale of 0/10 on VAS.   Pain location: low back to tailbone    Fatigue: none reported  Functional change: none stated  Treatment:   Chemotherapy: te-adjuvant chemo therapy completed 10/19  Pt is currently on chemotherapy: pt is getting 1 infusion/week for 3 weeks with 1 week break, and he takes oral chemotherapy daily.  Radiation: Completed in February 2020  Surgery date: 12/17/19 pt underwent right axillary lymph node dissection and resection excess lateral tissue; 11/22/19 right simple mastectomy with SLNB; total of 18 lymph nodes removed      Objective     Objective Measures updated at progress report unless specified.     LANDMARK RIGHT UE LEFT UE DIFF RUE Diff RUE Diff RUE Diff RUE Diff RUE Diff RUE  "Diff RUE Diff RUE Diff RUE Diff RUE Diff RUE Diff RUE Diff RUE Diff RUE Diff RUE Diff RUE Diff   Date Eval Eval Eval 10/17/22 10/17/22 10/29/22 10/19/22 11/2/22 11/2/22 11/11/22 11/11/22 11/29/22 11/29/22 12/5/22 12/5/22 12/13/22 12/13/22 12/21/22 12/21/22 12/30/22 12/30/22 1/6/23 1/6/23 1/26/23 1/26/23 2/6/23 2/6/23 3/6/23 3/6/23 3/23/23 3/23/23 3/30/23 3/30/23 4/3/23 4/3/23   E + 8" 50 cm 46.5 cm 3.5 cm 48 cm -2 47.5 cm -0.5 50.5 cm +3 47.5 cm -3 49.5 cm +2 50.5 cm +1 48.5 cm -2 49 cm +0.5 48 cm -1 50 cm +2 48.5 cm -1.5 49 cm +0.5 49 cm No chg 47 cm -2 49 cm +2 48.5 cm -0.5   E + 6" 45.5 cm 44 cm 1.5 cm 45 cm -0.5 46.5 cm +1.5 47 cm +0.5 45 cm -2 45.5 cm +0.5 46 cm +0.5 45 cm -1 45.5 cm +0.5 45.5 cm No chg 47 cm +1.5 45 cm -2 45.5 cm +0.5 46.5 cm +1 44 cm -2.5 46.5 cm +2.5 44.5 cm -2   E + 4" 40 cm 40.5 cm 0.5 cm 40.5 cm +0.5 40.5cm No chg 41 cm +0.5 40.5 cm -0.5 40.5 cm No chg 40 cm -0.5 41.5 cm +1.5  41 cm -0.5 41 cm No chg 42.5 cm +1.5 40 cm -2.5 41.5 cm +1.5 41.5 cm No chg 41 cm -0.5 41.5 cm +0.5 41.5 cm No chg   E + 2" 38.5 cm 38.5 cm 0 cm 39 cm +0.5 40 cm +1 39.5 cm -0.5 38.5 cm -1 38 cm -0.5 39 cm +1 39 cm No chg 39 cm No chg 39 cm No chg 39.5 cm +0.5 38.5 cm -1 37.5 cm -1 38 cm +0.5 38 cm No chg 39.5 cm +1.5 37.5 cm -2   Elbow 36.5 cm 34 cm 2.5 cm 35.5 cm -1 35.5 cm No chg 36 cm +0.5 34.5 cm -1.5 36 cm +1.5 36 cm No chg 35 cm -1 36 cm +1 35.5 cm -0.5 37.5 cm +2 35 cm -2.5 35 cm No chg 34 cm -1 34.5 cm +0.5 35 cm +0.5 36 cm +1   W+ 8" 37.5cm 33 cm 4.5 cm 38cm +0.5 36.5 cm -1.5 37.5 cm +1 36.5 cm -1 37.5 cm +1 37.5 cm No chg 37 cm -0.5 37 cm No chg 37 cm No chg 38 cm +1 37 cm -1 37.5 cm +0.5 36.5 cm -1 36.5 cm No chg 37 cm +0.5 37 cm No chg   W +  6" 35 cm 29.5 cm 5.5 cm 34 cm -1 34.5 cm +0.5 33.5 cm -1 32.5 cm -1 33 cm +0.5 33 cm No chg 33cm No chg 34.5 cm +1.5 34.5 cm No chg 36 cm +1.5 33.5 cm -2.5 34.5 cm +1 34 cm -0.5 33 cm -1 33 cm No chg 34 cm +1   W + 4" 31.5 cm 25 cm 6.5 cm 31.5 cm No chg 31.5 cm No " chg 29.5 cm -2 29 cm -0.5 30 cm +1 28 cm -2 29 cm +1 29 cm No chg 29.5 cm +0.5 32 cm +2.5 30 cm -2 30 cm No chg 29.5 cm -0.5 29 cm +0.5 28 cm -1 29.5 cm +1.5   Wrist 23 cm 20 cm 3 cm 22.5cm -0.5 21.5 cm -1 23 cm +1.5 21 cm -2 24 cm +3 22 cm -2 22 cm No chg 22.5 cm +0.5 23 cm +0.5 23 cm No chg 22 cm -1 21 cm -1 21.5 cm +0.5 22.5 cm +1 21.5 cm -1 21cm -0.5   DPC 28.5 cm 26 cm 2.5 cm 28 cm -0.5 27.5cm -0.5 28.5 cm +1 27 cm -1.5 28 cm +1 27 cm -1 26.5 cm -0.5 28 cm +1.5 27.5 cm -0.5 27 cm -0.5 28 cm +1 25.5 cm -2.5 26 cm +0.5 25.5 cm -0.5 26 cm +0.5 26 cm No chg   IP Thumb 8.5 cm 8 cm 0.5 cm 8.5 cm No chg 8.5 cm No chg 9 cm +0.5 8.5 cm -0.5 8.5 cm No chg 9 cm +0.5 8.5 cm -0.5 8.5 cm No chg 8.5 cm No chg 9 cm +0.5 9 cm No chg 8.5 cm -0.5 9 cm +0.5 9 cm No chg 8.5 cm -0.5 8 cm -0.5          Treatment         Paul received the following manual therapy techniques were performed to increased myofascial/soft tissue length, mobility and pliability, increase PROM, AROM and function as well as to decrease pain for 40 minutes      Short Neck- held due to history of seizures  Clear Healthy Lymph Nodes:  Left Axilla                                                  Right Groin  Open Anastomoses:  Anterior Axillo-Axillary  (AAA)                                    Axillo-Inguinal     (AI)                                    Posterior Axillo-Axillary  (GEREMIAS) -not performed today    Right Upper Arm (Lateral and Medial)  Right  Antecubital Fossa  Right Dorsal Forearm  Right Volar Forearm  Dorsum Right Hand  Right fingers     Rework Right UE  Rework Anastomoses, including GEREMIAS  Rework Healthy lymph Nodes      PT applies gauze to right fingers and hand, stockinette, cotton artiflex, and short stretch bandages to pt's RIGHT/LEFT/BILATERAL: right upper extremity.  Pt educated to wear bandaging until next session unless it causes pain, numbness, or changes in skin color/temperature, or if they start to fall down.  If removed, pt instructed to  roll up bandages and cotton padding and bring to next session. If bandages are removed, pt can wear his tubigrip.  Pt has Vet glove to cover bandages in the shower, and we reviewed directions on how to care for bandages. Pt verbalizes understanding of all topics.    Paul received therapeutic exercises to develop ROM, posture, core stabilization, and lymphatic motility for 8 minutes including:     Diaphragmatic breathing, 2 x 5 reps  Reviewed how to incorporate spine precautions with core strengthening. Pt was advised to avoid standard push-ups and planks, as this would likely be too much for his spine and right arm (lymphedema)        Home Exercise Program and Patient Education   Education provided re:  - role of PT in multi - disciplinary team, goals for PT  - progress towards goals         Written Home Exercises Provided: Patient instructed to cont prior HEP.  Exercises were reviewed and Paul was able to demonstrate them prior to the end of the session.  Paul demonstrated good  understanding of the education provided.     See EMR under Media for self lymphatic drainage provided prior visit.    Pt has no cultural, educational or language barriers to learning provided.    Assessment     Patient tolerated session well. Session limited due to late arrival. He has been using the compression pump at home and feels it is helping. His arm appears smaller today, especially his hand. He tolerated complete decongestive therapy well. Reviewed how to apply spine precautions to exercises. Once his measurements stabilize, he will benefit from a compression sleeve and glove.  Will follow up at next session and continue/progress as tolerated. May consider self-bandaging review/education. Pt has met goals as noted below. Will progress as tolerated working towards remaining goals with emphasis on getting compression sleeve and glove.    Pt prognosis is Good. Pt will continue to benefit from skilled outpatient physical therapy to  address the deficits listed in the problem list chart on initial evaluation, provide pt/family education and to maximize pt's level of independence in the home and community environment.     Anticipated barriers to physical therapy: advanced disease    Goals as follows:  Short Term Goals (6 weeks)  1. Pt will demo decrease in girth in right upper extremity by >/= 2cm to allow for improved UE symmetry, clothing choice, ROM, and UE function. (Met, 1/26/23  2. Patient will demonstrate 100% knowledge of lymphedema precautions and signs of infection to allow for reduced risk of lymphedema, reduced risk of infection, and/or exacerbation.  (met)  3. Patient will perform self lymph drainage techniques to enhance lymphatic drainage and mobility.  ( met)  4. Patient will tolerate daily activities with multilayered bandaging to enhance lymphatic drainage and skin elasticity.  (met)  5. Pt will tolerate HEP for improved strength, functional mobility, ROM, posture, and endurance. (met)        Long Term Goals: ( 12  weeks)  1. Patient to show decreased girth in right upper extremity by >/= 3cm to allow for improved UE symmetry, clothing choice, ROM, and UE function.  (progressing, not met)  2. Patient will show reduction in density to mild or less with improved contour of limb to allow for improved cosmesis, improved lymphatic drainage, and functional mobility.  (progressing, not met)  3. Patient to bruna/doff compression garment with daily compliance to support lymphatic mobility and skin elasticity.  (progressing, not met)  4. Pt to show improved postural awareness and alignment for improved functional mobility.  (progressing, not met)  5. Pt to be independent and compliant with HEP to allow for improved ROM, reach and carry with functional endurance and strength.    (met)           Plan   Outpatient physical therapy 2x week for 4 weeks to include the following:   Manual Therapy, Neuromuscular Re-ed, Orthotic Management and  Training, Patient Education, Self Care, Therapeutic Activities, and Therapeutic Exercise.     Plan of care Certification Period: 3/6/23 to 4/7/23       Therapist: Katelynn Harrell, PT  4/6/2023

## 2023-04-08 DIAGNOSIS — F41.1 ANXIETY ASSOCIATED WITH CANCER DIAGNOSIS: ICD-10-CM

## 2023-04-08 DIAGNOSIS — C80.1 ANXIETY ASSOCIATED WITH CANCER DIAGNOSIS: ICD-10-CM

## 2023-04-10 ENCOUNTER — PATIENT MESSAGE (OUTPATIENT)
Dept: REHABILITATION | Facility: HOSPITAL | Age: 48
End: 2023-04-10

## 2023-04-10 RX ORDER — ALPRAZOLAM 0.5 MG/1
0.5 TABLET ORAL 3 TIMES DAILY PRN
Qty: 60 TABLET | Refills: 0 | Status: SHIPPED | OUTPATIENT
Start: 2023-04-10 | End: 2023-05-11 | Stop reason: SDUPTHER

## 2023-04-11 ENCOUNTER — TUMOR BOARD CONFERENCE (OUTPATIENT)
Dept: SURGERY | Facility: CLINIC | Age: 48
End: 2023-04-11
Payer: MEDICARE

## 2023-04-11 NOTE — PLAN OF CARE
DISCONTINUE ON PATHWAY REGIMEN - Breast    [id number]        Capecitabine (Xeloda)     **Always confirm dose/schedule in your pharmacy ordering system**    REASON: Disease Progression  PRIOR TREATMENT: [id number]  TREATMENT RESPONSE: Progressive Disease (PD)    START ON PATHWAY REGIMEN - Breast    [id number]        Sacituzumab govitecan-hziy (Trodelvy)     **Always confirm dose/schedule in your pharmacy ordering system**    Patient Characteristics:  Distant Metastases or Locoregional Recurrent Disease - Unresected or Locally   Advanced Unresectable Disease Progressing after Neoadjuvant and Local Therapies,   ER Negative/Unknown, Chemotherapy, HER2 Negative/Unknown, Second Line  Therapeutic Status: Distant Metastases  HER2 Status: Negative (-)  ER Status: Negative (-)  WI Status: Negative (-)  Therapy Approach Indicated: Standard Chemotherapy/Endocrine Therapy  Line of Therapy: Second Line  Intent of Therapy:  Non-Curative / Palliative Intent, Discussed with Patient

## 2023-04-11 NOTE — PROGRESS NOTES
Oncology History   Malignant neoplasm involving both nipple and areola of right breast in male, estrogen receptor negative   5/1/2019 Imaging Significant Findings    Mammogram and US  Impression:  Right  Mass: Right breast 14 mm mass at the retroareolar anterior position. Assessment: 4 - Suspicious finding. Biopsy is recommended.      Left  There is no mammographic or sonographic evidence of malignancy.          Recommendation:  Biopsy is recommended.       5/2/2019 Biopsy    BREAST, RIGHT, RETROAREOLAR MASS, ULTRASOUND-GUIDED BIOPSY:  Invasive ductal carcinoma with lobular features.  Size of invasive carcinoma: 5 MM in greatest linear dimension within a single       5/2/2019 Breast Tumor Markers    Estrogen: Negative  Progesterone: Negative  HER2: Negative  Ki67: 80     5/17/2019 Cancer Staged    Staging form: Breast, AJCC 8th Edition  - Clinical stage from 5/17/2019: Stage IB (cT1c, cN0, cM0, G2, ER-, KY-, HER2-) - Signed by Richa Bahena MD on 5/17/2019 5/27/2019 - 7/21/2019 Chemotherapy    Treatment Summary   Plan Name: OP BREAST DOSE-DENSE AC - DOXORUBICIN CYCLOPHOSPHAMIDE Q2W  Treatment Goal: Curative  Status: Inactive  Start Date: 5/27/2019  End Date: 7/9/2019  Provider: Richa Bahena MD  Chemotherapy: DOXOrubicin chemo injection 186 mg, 60 mg/m2 = 186 mg, Intravenous, Clinic/[facility] 1 time, 4 of 4 cycles  Administration: 186 mg (5/27/2019), 186 mg (6/10/2019), 186 mg (6/24/2019), 186 mg (7/8/2019)  cyclophosphamide (CYTOXAN) 600 mg/m2 = 1,865 mg in sodium chloride 0.9% 250 mL chemo infusion, 600 mg/m2 = 1,865 mg, Intravenous, Clinic/[facility] 1 time, 4 of 4 cycles  Administration: 1,865 mg (5/27/2019), 1,865 mg (6/10/2019), 1,865 mg (6/24/2019), 1,865 mg (7/8/2019)       7/22/2019 - 10/15/2019 Chemotherapy    Treatment Summary   Plan Name: OP PACLITAXEL QW  Treatment Goal: Curative  Status: Inactive  Start Date: 7/24/2019  End Date: 10/8/2019  Provider: Richa Bahena MD  Chemotherapy: PACLitaxel (TAXOL)  80 mg/m2 = 246 mg in sodium chloride 0.9% 250 mL chemo infusion, 80 mg/m2 = 246 mg, Intravenous, Clinic/[facility] 1 time, 12 of 12 cycles  Dose modification: 60 mg/m2 (original dose 80 mg/m2, Cycle 3), 55 mg/m2 (original dose 80 mg/m2, Cycle 7), 60 mg/m2 (original dose 80 mg/m2, Cycle 7), 50 mg/m2 (original dose 80 mg/m2, Cycle 9), 50 mg/m2 (original dose 80 mg/m2, Cycle 10)  Administration: 246 mg (7/24/2019), 246 mg (7/31/2019), 186 mg (8/7/2019), 186 mg (8/14/2019), 186 mg (8/21/2019), 186 mg (8/28/2019), 186 mg (9/4/2019), 174 mg (9/11/2019), 156 mg (9/18/2019), 156 mg (9/25/2019), 156 mg (10/1/2019), 156 mg (10/8/2019)       11/22/2019 Breast Surgery    On 11/22/19 Dr whyte performed a right breast mastectomy with SLN biopsy with pathology showing grade 2, 6 mm IDC with extensive treatment effect, no LVI with 1 LN positive 4 mm, negative margins. The on 12/17/19, Dr Whyte performed right axillary dissection with pathology still pending.       11/22/2019 Cancer Staged    ypT1b N1       12/17/2019 Breast Surgery    Surgery: Dr Shima Whyte, [phone] performed right ALND with pathology showing 14 negative lymph nodes.            1/14/2020 Tumor Conference       Post mastectomy radiation therapy: Xeloda, then possible Keytruda trial .     2/3/2020 - 3/23/2020 Radiation Therapy    Treating physician: Speedy Nair MD   Total Dose: 50.4 Gy to the chest wall and regional lymphatics  10 Gy boost to the prostate.   Fractions: 28 fractions at 1.8 Gy per fraction  5 fractions at 2 Gy per fraction        3/20/2020 - 3/20/2020 Chemotherapy     * 4/15/2020 - 9/28/20 completed 6 cycles adjuvant Xeloda 1000 mg/m2 bid days 1-14 every 21 days  Treatment Summary   Plan Name: OP CAPECITABINE 1250MG/M2 BID Q3W  Treatment Goal: Curative  Status: Inactive  Start Date: 3/20/2020  End Date: 3/20/2020  Provider: Richa Bahena MD  Chemotherapy: [No matching medication found in this treatment plan]       6/14/2021 - 6/14/2021  Chemotherapy    Treatment Summary   Plan Name: OP CAPECITABINE 1000MG/M2 Q3W  Treatment Goal: Palliative  Status: Inactive  Start Date:   End Date:   Provider: Rosa Linn MD  Chemotherapy: capecitabine (XELODA) 500 MG Tab, 1,000 mg/m2, Oral, 2 times daily, 0 of 1 cycle, Start date: --, End date: --       8/13/2021 - 3/23/2023 Chemotherapy    Treatment Summary   Plan Name: OP BREAST NAB-PACLITAXEL D1, D8, D15 + ALPELISIB   Treatment Goal: Palliative  Status: Inactive  Start Date: 8/13/2021  End Date: 3/23/2023  Provider: Rosa Linn MD  Chemotherapy: alpelisib 300 mg/day (150 mg x 2) Tab, 300 mg (100 % of original dose 300 mg), Oral, Daily, 1 of 1 cycle, Start date: --, End date: --  Dose modification: 300 mg (original dose 300 mg, Cycle 0), 150 mg (original dose 300 mg, Cycle 0), 300 mg (original dose 300 mg, Cycle 0)       4/11/2023 Tumor Conference    Patient with progression of metastatic disease on current treatment on recent PET. Medical Oncology discussed treatment options and recommend trastuzumab.       4/17/2023 -  Chemotherapy    Treatment Summary   Plan Name: OP SACITUZUMAB GOVITECAN-HZIY Q3W  Treatment Goal: Palliative  Status: Active  Start Date: 4/17/2023 (Planned)  End Date: 3/25/2024 (Planned)  Provider: Rosa Linn MD  Chemotherapy: sacituzumab govitecan-hziy (TRODELVY) 1,524 mg in sodium chloride 0.9% 500 mL chemo infusion, 10 mg/kg, Intravenous, Clinic/[facility] 1 time, 0 of 17 cycles

## 2023-04-13 ENCOUNTER — TELEPHONE (OUTPATIENT)
Dept: PSYCHIATRY | Facility: CLINIC | Age: 48
End: 2023-04-13
Payer: MEDICARE

## 2023-04-13 NOTE — TELEPHONE ENCOUNTER
Patient contacted due to missed appt. Forgot visit was today. Doing well, overall. Did not realize trazodone was available to him, but plans to pick it up today.  (Called in by palliative care.)  Will reschedule, as needed.  GENNARO Sandoval, PhD

## 2023-04-17 NOTE — PROGRESS NOTES
Subjective     Patient ID: Paul Li Jr. is a 47 y.o. male.    Chief Complaint: Malignant neoplasm involving both nipple and areola of righ    HPI    Presents for chemotherapy consent:  Sacituzumab govitecan-hziy (Trodelvy)   Reviewed medication and potential side effects    Weight stable     At last reviewed scan- reviewed for areas of progression     - 3/29/2023 MRI sacrum/coccyx:  FINDINGS:  There is abnormal T1 hypointense signal throughout the sacrum as well as the visualized left hemipelvis.  Multiple discrete foci also noted within the right partially visualized iliac bone.  These are accompanied by heterogeneous T2 signal as well as contrast enhancement and in keeping with osseous metastatic disease.  No evidence for epidural extension into the sacral canal or involvement of the sacral foramina.  No visualized soft tissue lesions.  No fracture.  Nonspecific mild soft tissue edema noted within the bilateral gluteal minimus, piriformis and left iliacus muscles.  Mild degenerative changes are noted in the sacroiliac joints.  No pelvic ascites or lymphadenopathy seen.  Visualized sciatic nerves are unremarkable.  Impression:  Extensive osseous metastatic disease of the pelvis involving much of the sacrum.  No evidence for invasion of the sacral canal or neural foramina.    - MRI L-spine:  FINDINGS:  Alignment: Normal.  Vertebrae:  Numerous T1 hypointense, STIR hyperintense, enhancing lesions are seen throughout the lumbar spine..  No fracture.  No epidural extension.  Discs: Disc desiccation and mild height loss of L4-5.  Posterior annular fissure of L4-5.  Remaining discs appear normal in height and signal.  No evidence for discitis.  Cord: Normal.  Conus terminates at L2  Degenerative findings:  T12-L1: No spinal canal stenosis or neural foraminal narrowing.  L1-L2: No spinal canal stenosis or neural foraminal narrowing.  L2-L3: Mild facet arthropathy.  No spinal canal stenosis or neural foraminal  narrowing.  L3-L4: Mild facet arthropathy.  No spinal canal stenosis or neural foraminal narrowing.  L4-L5: Circumferential disc bulge and moderate facet arthropathy.  No spinal canal stenosis or neural foraminal narrowing.  L5-S1: Mild facet arthropathy.  No spinal canal stenosis or neural foraminal narrowing.  Paraspinal muscles & soft tissues: Mild paraspinal muscle atrophy.  Impression:  Redemonstration of known diffuse osseous metastatic disease throughout the lumbar spine.  When compared to recent PET-CT, findings appear stable but progressed from prior MRI on 06/03/2021.  No fracture. No epidural extension.     - 3/20/2023 PET scan:  FINDINGS:  Quality of the study: Adequate.  In the head and neck, there are no hypermetabolic lesions worrisome for malignancy. There are no hypermetabolic mucosal lesions, and there are no pathologically enlarged or hypermetabolic lymph nodes.  In the chest, there is postoperative change of right mastectomy and right axillary lymph node dissection.  There is a new 1.1 cm right hilar lymph node (3-101) with max SUV 6.3 (603-99).  There is a right lower lobe pulmonary nodule measuring 0.9 cm, which does not show increased tracer uptake, though has been gradually enlarging (measured 0.3 cm on 06/09/2021) (3-106).  In the abdomen and pelvis, there is physiologic tracer distribution within the abdominal organs and excretion into the genitourinary system.  In the bones, there are numerous sclerotic lesions, with few index lesions detailed below:  Sclerotic lesion in T7 with tracer uptake mildly increased prior exam with SUV max 5.8 (previously 5.2) (3-107) (603-109).  T12 sclerotic focus demonstrates an SUV max of 5.9 (previously 4.8) (3-153, 603-153).  L5 vertebral body sclerotic lesion with an SUV max of 6.8 (previously 8.7) (3-213).  Newly hypermetabolic focus at the inferior aspect of the left scapula with SUV max of 7.4 (603-103).  Increased size and uptake of a left ischial  tuberosity sclerotic lesion with SUV max of 7.3 (3-266).  Increased size of a L4 vertebral body sclerotic lesion (3-200).  Extensive sclerotic change through the pelvic bones grossly stable compared to the prior exam.  CT: Right maxillary mucosal retention cysts.  Left chest wall Port-A-Cath.  Postoperative change of right mastectomy with right axillary lymph node dissection.  Small bilateral fat containing inguinal hernias.  Impression:  In this patient with breast cancer status post right mastectomy and right axillary lymph node dissection there has been progression of disease.  Newly tracer avid right hilar lymph node consistent with derick metastasis.  In the bones there are numerous sclerotic lesions, some of which demonstrate new abnormal uptake on FDG PET.  Suspicious right lower lobe pulmonary nodule does not show increase tracer uptake, though it is been gradually enlarging, and attention on follow-up is advised.  This report was flagged in Epic as abnormal.     Returns for follow up for chemotherapy (Abraxane and PO Aplelisib).prior to C19     Diagnosis:  Metastatic TNBC progressed (prior Stage IB ([id number]) triple negative invasive ductal carcinoma with lobular features of right breast, retroareolar, Grade 2, Ki67 80%, diagnosed 2019)     Oncology History:  Metastatic  Set up for scans (due to back pain) which were consistent for recurrence.   Imagin/3/2021 MRI T/L spine:  FINDINGS:  Alignment: Within normal limits.  Vertebrae: Low T1 signal intensity in the left T12 vertebral body extending to the left pedicle, S1 and S2 vertebral bodies with abnormal enhancement.  The vertebral heights are well maintained.  No evidence of acute fractures.  Abnormal appearance of the LEFT sacrum and ilium as well.  Discs: Degenerative disease of the level of L4-L5 with posterior annular fissure.  Conus appears within normal limits terminating at the level of the L2. level.  Cauda equina appears unremarkable.  Mild  circumferential disc bulging at the level of T10-T11 abutting the ventral thecal sac.  No significant spinal canal stenosis or neural foraminal narrowing throughout the thoracic spine.  No significant spinal canal stenosis or neural foraminal narrowing throughout the lumbar spine.  Paraspinous muscles and soft tissues: Subcentimeter focal enhancement in the posterior column along the supraspinous ligament at the level of L1-L2 (sagittal series 21, image 10) potentially inflammatory versus neoplastic.  Impression:  Abnormal appearance of the T12, S1, S2 vertebral bodies as well as LEFT sacrum and ilium concerning for metastatic disease in this patient with history of breast cancer.  No evidence for significant central canal narrowing.  Additional findings, as above.  This report was flagged in Epic as abnormal.     6/9/2021 PET scan:  COMPARISON:  MRI thoracic and lumbar spine 06/03/2021  FINDINGS:  Quality of the study: Adequate.  In the head and neck, there are no hypermetabolic lesions worrisome for malignancy. There are no hypermetabolic mucosal lesions, and there are no pathologically enlarged or hypermetabolic lymph nodes.  In the chest, there is postoperative change of right mastectomy/right axillary lymph node dissection.  There is low level hypermetabolic activity with mild soft tissue thickening in the skin/superficial subcutaneous fat of the right chest wall (axial fused image 78) with SUV max 3.6.  There is soft tissue thickening measuring approximately 1.8 x 7.9 cm in the subcutaneous fat of the right chest wall (axial series 3, image 84) with SUV max 3.1.  There are a few bilateral pulmonary nodules measuring up to 0.3 cm in the left lung (axial series 3, image 88) and 0.5 cm in the right lung (axial series 3, image 84).  These nodules are too small to characterize by PET.  There are no pathologically enlarged or hypermetabolic lymph nodes.  In the abdomen and pelvis, there is physiologic tracer  distribution within the abdominal organs and excretion into the genitourinary system.  Subtle sen mesentery and multiple prominent mesenteric lymph nodes measuring up to 1.8 cm in long axis with background activity.  In the bones, there are several hypermetabolic lesions worrisome for malignancy.  Sclerotic lesion in the left T12 vertebral body (axial series 3, image 131) with SUV max 12.  Sclerotic lesions in the sacrum (for example axial series 3, image 188) with SUV max 9.7.  Sclerotic lesions in the left iliac bone (for example axial series 3, image 205) with SUV max 6.  In the extremities, there are no hypermetabolic lesions worrisome for malignancy.  Incidental findings:  Bilateral maxillary antra mucous retention cysts.  Fat containing right inguinal hernia.  Impression:  1. In this patient with right breast carcinoma status post mastectomy/right axillary lymph node dissection, there are multiple sclerotic lesions in the T12 vertebral body, sacrum, and left iliac bone with hypermetabolic activity concerning for active metastatic disease and in keeping with findings on MRI 06/03/2021.  2. Additionally there is low-level hypermetabolic activity with soft tissue thickening in the right chest wall as detailed above.  These findings are nonspecific and may be related to postoperative/post radiation change, with recurrent malignancy not excluded.  3. Nonspecific mesenteric haziness and lymph nodes which may indicate mesenteric adenitis.  Recommend clinical correlation and attention on follow-up.  4. Additional findings as above.  I, Sohan Tillman MD, attest that I reviewed and interpreted the images.     In summary,   Started aplelisib 8/22/2021   Abraxane C1 started 8/13/2021  We had sent Guardant and discussed results with Molecular tumor board  He also had a repeat bx to confirm metastatic triple negative breast cancer  Plan:   Nab-Paclitaxel and Alpelisib  Reference:  https://ascopubs.org/doi/abs/10.1200/JCO.[email or web address]_[email or web address]  Also on Zometa-      - C1D1 Abraxane 8/13/2021  - Started aplelisib 8/22/2021               Oncology History   Malignant neoplasm involving both nipple and areola of right breast in male, estrogen receptor negative   5/1/2019 Imaging Significant Findings     Mammogram and US  Impression:  Right  Mass: Right breast 14 mm mass at the retroareolar anterior position. Assessment: 4 - Suspicious finding. Biopsy is recommended.   Left  There is no mammographic or sonographic evidence of malignancy.  Recommendation:  Biopsy is recommended.      5/2/2019 Biopsy     BREAST, RIGHT, RETROAREOLAR MASS, ULTRASOUND-GUIDED BIOPSY:  Invasive ductal carcinoma with lobular features.  Size of invasive carcinoma: 5 MM in greatest linear dimension within a single      5/2/2019 Breast Tumor Markers     Estrogen: Negative  Progesterone: Negative  HER2: Negative  Ki67: 80      5/17/2019 Cancer Staged     Staging form: Breast, AJCC 8th Edition  - Clinical stage from 5/17/2019: Stage IB (cT1c, cN0, cM0, G2, ER-, NC-, HER2-) - Signed by Richa Bahena MD on 5/17/2019 5/27/2019 - 7/21/2019 Chemotherapy     Treatment Summary   Plan Name: OP BREAST DOSE-DENSE AC - DOXORUBICIN CYCLOPHOSPHAMIDE Q2W  Treatment Goal: Curative  Status: Inactive  Start Date: 5/27/2019  End Date: 7/9/2019  Provider: Richa Bahena MD  Chemotherapy: DOXOrubicin chemo injection 186 mg, 60 mg/m2 = 186 mg, Intravenous, Clinic/[facility] 1 time, 4 of 4 cycles  Administration: 186 mg (5/27/2019), 186 mg (6/10/2019), 186 mg (6/24/2019), 186 mg (7/8/2019)  cyclophosphamide (CYTOXAN) 600 mg/m2 = 1,865 mg in sodium chloride 0.9% 250 mL chemo infusion, 600 mg/m2 = 1,865 mg, Intravenous, Clinic/[facility] 1 time, 4 of 4 cycles  Administration: 1,865 mg (5/27/2019), 1,865 mg (6/10/2019), 1,865 mg (6/24/2019), 1,865 mg (7/8/2019)      7/22/2019 - 10/15/2019 Chemotherapy     Treatment Summary   Plan Name: OP PACLITAXEL  QW  Treatment Goal: Curative  Status: Inactive  Start Date: 7/24/2019  End Date: 10/8/2019  Provider: Richa Bahena MD  Chemotherapy: PACLitaxel (TAXOL) 80 mg/m2 = 246 mg in sodium chloride 0.9% 250 mL chemo infusion, 80 mg/m2 = 246 mg, Intravenous, Clinic/[facility] 1 time, 12 of 12 cycles  Dose modification: 60 mg/m2 (original dose 80 mg/m2, Cycle 3), 55 mg/m2 (original dose 80 mg/m2, Cycle 7), 60 mg/m2 (original dose 80 mg/m2, Cycle 7), 50 mg/m2 (original dose 80 mg/m2, Cycle 9), 50 mg/m2 (original dose 80 mg/m2, Cycle 10)  Administration: 246 mg (7/24/2019), 246 mg (7/31/2019), 186 mg (8/7/2019), 186 mg (8/14/2019), 186 mg (8/21/2019), 186 mg (8/28/2019), 186 mg (9/4/2019), 174 mg (9/11/2019), 156 mg (9/18/2019), 156 mg (9/25/2019), 156 mg (10/1/2019), 156 mg (10/8/2019)      11/22/2019 Breast Surgery     On 11/22/19 Dr whyte performed a right breast mastectomy with SLN biopsy with pathology showing grade 2, 6 mm IDC with extensive treatment effect, no LVI with 1 LN positive 4 mm, negative margins. The on 12/17/19, Dr Whyte performed right axillary dissection with pathology still pending.      11/22/2019 Cancer Staged     ypT1b N1      12/17/2019 Breast Surgery     Surgery: Dr Shima Whyte, [phone] performed right ALND with pathology showing 14 negative lymph nodes.             1/14/2020 Tumor Conference      Post mastectomy radiation therapy: Xeloda, then possible Keytruda trial .      2/3/2020 - 3/23/2020 Radiation Therapy     Treating physician: Speedy Nair MD   Total Dose: 50.4 Gy to the chest wall and regional lymphatics  10 Gy boost to the prostate.   Fractions: 28 fractions at 1.8 Gy per fraction  5 fractions at 2 Gy per fraction       2/28/2020 -  Chemotherapy     * 4/15/2020 - 9/28/20 completed 6 cycles adjuvant Xeloda 1000 mg/m2 bid days 1-14 every 21 days  Treatment Summary   Plan Name: OP CAPECITABINE 1250MG/M2 BID Q3W  Treatment Goal: Curative  Status: Active  Start Date: 3/20/2020  End  Date: 3/20/2020  Provider: Richa Bahena MD  Chemotherapy: [No matching medication found in this treatment plan]            Hydrocele repaired.      Review of Systems  See prior     Objective     Physical Exam  See prior     Assessment and Plan     Problem List Items Addressed This Visit       Uncontrolled type 2 diabetes mellitus with hyperglycemia    Neuropathy    Malignant neoplasm involving both nipple and areola of right breast in male, estrogen receptor negative - Primary    Relevant Medications    dexAMETHasone (DECADRON) 4 MG Tab (Start on 4/19/2023)    ondansetron (ZOFRAN-ODT) 8 MG TbDL    OLANZapine (ZYPREXA) 5 MG tablet    Other Relevant Orders    Physician communication order (Completed)    Class 3 severe obesity due to excess calories with serious comorbidity and body mass index (BMI) of 50.0 to 59.9 in adult    Bone metastases    Relevant Medications    ibuprofen (ADVIL,MOTRIN) 600 MG tablet    Anemia associated with chemotherapy     DM, HTN- controlled  Neuropathy- controlled with gabapentin    Metastatic breast cancer- consented for new chemotherapy  Anti emetics sent    30 minutes reviewed    Route Chart for Scheduling    Med Onc Chart Routing      Follow up with physician . Cbc,cmp and start chemo (already consented) next week- see PJ 3 weeks later with ep cbc, cmp and chemo next day   Follow up with J[facility]    Infusion scheduling note    Injection scheduling note    Labs    Imaging    Pharmacy appointment    Other referrals         Treatment Plan Information   OP SACITUZUMAB GOVITECAN-HZIY Q3W   Rosa Linn MD   Upcoming Treatment Dates - OP SACITUZUMAB GOVITECAN-HZIY Q3W    4/19/2023       Pre-Medications       ACETAMINOPHEN  325 MG ORAL TAB       DIPHENHYDRAMINE IV ORDERABLE       FAMOTIDINE (PF) 20 MG/2 ML IV SOLN       Chemotherapy       sacituzumab govitecan-hziy (TRODELVY) 1,524 mg in sodium chloride 0.9% 500 mL chemo infusion       Supportive Care       atropine injection 0.4 mg        Antiemetics       APREPITANT 7.2 MG/ML IV EMUL       PALONOSETRON 0.25MG/DEXAMETHASONE 12MG ORDERABLE  4/26/2023       Pre-Medications       ACETAMINOPHEN  325 MG ORAL TAB       DIPHENHYDRAMINE IV ORDERABLE       FAMOTIDINE (PF) 20 MG/2 ML IV SOLN       Chemotherapy       sacituzumab govitecan-hziy (TRODELVY) 1,524 mg in sodium chloride 0.9% 500 mL chemo infusion       Supportive Care       atropine injection 0.4 mg       Antiemetics       APREPITANT 7.2 MG/ML IV EMUL       PALONOSETRON 0.25MG/DEXAMETHASONE 12MG ORDERABLE  5/10/2023       Pre-Medications       ACETAMINOPHEN  325 MG ORAL TAB       DIPHENHYDRAMINE IV ORDERABLE       FAMOTIDINE (PF) 20 MG/2 ML IV SOLN       Chemotherapy       sacituzumab govitecan-hziy (TRODELVY) 1,524 mg in sodium chloride 0.9% 500 mL chemo infusion       Supportive Care       atropine injection 0.4 mg       Antiemetics       APREPITANT 7.2 MG/ML IV EMUL       PALONOSETRON 0.25MG/DEXAMETHASONE 12MG ORDERABLE  5/17/2023       Pre-Medications       ACETAMINOPHEN  325 MG ORAL TAB       DIPHENHYDRAMINE IV ORDERABLE       FAMOTIDINE (PF) 20 MG/2 ML IV SOLN       Chemotherapy       sacituzumab govitecan-hziy (TRODELVY) 1,524 mg in sodium chloride 0.9% 500 mL chemo infusion       Supportive Care       atropine injection 0.4 mg       Antiemetics       APREPITANT 7.2 MG/ML IV EMUL       PALONOSETRON 0.25MG/DEXAMETHASONE 12MG ORDERABLE    Supportive Plan Information  OP FILGRASTIM 480 MCG   Michelle Grayson NP   Upcoming Treatment Dates - OP FILGRASTIM 480 MCG    No upcoming days in selected categories.    Therapy Plan Information  PORT FLUSH  Flushes  heparin, porcine (PF) 100 unit/mL injection flush 500 Units  500 Units, Intravenous, Every visit  sodium chloride 0.9% flush 10 mL  10 mL, Intravenous, Every visit    ZOLEDRONIC ACID (ZOMETA) IV  Medications  zoledronic 4 mg/100 mL infusion 4 mg  4 mg, Intravenous, Every 4 weeks  Flushes  sodium chloride 0.9% 250 mL flush bag  Intravenous,  Every 4 weeks  sodium chloride 0.9% flush 10 mL  10 mL, Intravenous, Every 4 weeks  heparin, porcine (PF) 100 unit/mL injection flush 500 Units  500 Units, Intravenous, Every 4 weeks  alteplase injection 2 mg  2 mg, Intra-Catheter, Every 4 weeks

## 2023-04-18 ENCOUNTER — OFFICE VISIT (OUTPATIENT)
Dept: HEMATOLOGY/ONCOLOGY | Facility: CLINIC | Age: 48
End: 2023-04-18
Payer: MEDICARE

## 2023-04-18 VITALS
HEIGHT: 73 IN | SYSTOLIC BLOOD PRESSURE: 135 MMHG | BODY MASS INDEX: 41.75 KG/M2 | OXYGEN SATURATION: 99 % | TEMPERATURE: 98 F | DIASTOLIC BLOOD PRESSURE: 72 MMHG | WEIGHT: 315 LBS | RESPIRATION RATE: 17 BRPM | HEART RATE: 72 BPM

## 2023-04-18 DIAGNOSIS — G62.9 NEUROPATHY: ICD-10-CM

## 2023-04-18 DIAGNOSIS — E11.65 UNCONTROLLED TYPE 2 DIABETES MELLITUS WITH HYPERGLYCEMIA: ICD-10-CM

## 2023-04-18 DIAGNOSIS — E66.01 CLASS 3 SEVERE OBESITY DUE TO EXCESS CALORIES WITH SERIOUS COMORBIDITY AND BODY MASS INDEX (BMI) OF 50.0 TO 59.9 IN ADULT: ICD-10-CM

## 2023-04-18 DIAGNOSIS — Z17.1 MALIGNANT NEOPLASM INVOLVING BOTH NIPPLE AND AREOLA OF RIGHT BREAST IN MALE, ESTROGEN RECEPTOR NEGATIVE: Primary | ICD-10-CM

## 2023-04-18 DIAGNOSIS — T45.1X5A ANEMIA ASSOCIATED WITH CHEMOTHERAPY: ICD-10-CM

## 2023-04-18 DIAGNOSIS — D64.81 ANEMIA ASSOCIATED WITH CHEMOTHERAPY: ICD-10-CM

## 2023-04-18 DIAGNOSIS — C50.021 MALIGNANT NEOPLASM INVOLVING BOTH NIPPLE AND AREOLA OF RIGHT BREAST IN MALE, ESTROGEN RECEPTOR NEGATIVE: Primary | ICD-10-CM

## 2023-04-18 DIAGNOSIS — C79.51 MALIGNANT NEOPLASM METASTATIC TO BONE: ICD-10-CM

## 2023-04-18 PROCEDURE — 99214 OFFICE O/P EST MOD 30 MIN: CPT | Mod: S$GLB,,, | Performed by: INTERNAL MEDICINE

## 2023-04-18 PROCEDURE — 99999 PR PBB SHADOW E&M-EST. PATIENT-LVL V: ICD-10-PCS | Mod: PBBFAC,,, | Performed by: INTERNAL MEDICINE

## 2023-04-18 PROCEDURE — 99214 PR OFFICE/OUTPT VISIT, EST, LEVL IV, 30-39 MIN: ICD-10-PCS | Mod: S$GLB,,, | Performed by: INTERNAL MEDICINE

## 2023-04-18 PROCEDURE — 3078F PR MOST RECENT DIASTOLIC BLOOD PRESSURE < 80 MM HG: ICD-10-PCS | Mod: CPTII,S$GLB,, | Performed by: INTERNAL MEDICINE

## 2023-04-18 PROCEDURE — 3078F DIAST BP <80 MM HG: CPT | Mod: CPTII,S$GLB,, | Performed by: INTERNAL MEDICINE

## 2023-04-18 PROCEDURE — 99999 PR PBB SHADOW E&M-EST. PATIENT-LVL V: CPT | Mod: PBBFAC,,, | Performed by: INTERNAL MEDICINE

## 2023-04-18 PROCEDURE — 3008F BODY MASS INDEX DOCD: CPT | Mod: CPTII,S$GLB,, | Performed by: INTERNAL MEDICINE

## 2023-04-18 PROCEDURE — 4010F ACE/ARB THERAPY RXD/TAKEN: CPT | Mod: CPTII,S$GLB,, | Performed by: INTERNAL MEDICINE

## 2023-04-18 PROCEDURE — 3075F SYST BP GE 130 - 139MM HG: CPT | Mod: CPTII,S$GLB,, | Performed by: INTERNAL MEDICINE

## 2023-04-18 PROCEDURE — 1159F MED LIST DOCD IN RCRD: CPT | Mod: CPTII,S$GLB,, | Performed by: INTERNAL MEDICINE

## 2023-04-18 PROCEDURE — 1159F PR MEDICATION LIST DOCUMENTED IN MEDICAL RECORD: ICD-10-PCS | Mod: CPTII,S$GLB,, | Performed by: INTERNAL MEDICINE

## 2023-04-18 PROCEDURE — 4010F PR ACE/ARB THEARPY RXD/TAKEN: ICD-10-PCS | Mod: CPTII,S$GLB,, | Performed by: INTERNAL MEDICINE

## 2023-04-18 PROCEDURE — 3075F PR MOST RECENT SYSTOLIC BLOOD PRESS GE 130-139MM HG: ICD-10-PCS | Mod: CPTII,S$GLB,, | Performed by: INTERNAL MEDICINE

## 2023-04-18 PROCEDURE — 3008F PR BODY MASS INDEX (BMI) DOCUMENTED: ICD-10-PCS | Mod: CPTII,S$GLB,, | Performed by: INTERNAL MEDICINE

## 2023-04-18 PROCEDURE — 1160F RVW MEDS BY RX/DR IN RCRD: CPT | Mod: CPTII,S$GLB,, | Performed by: INTERNAL MEDICINE

## 2023-04-18 PROCEDURE — 1160F PR REVIEW ALL MEDS BY PRESCRIBER/CLIN PHARMACIST DOCUMENTED: ICD-10-PCS | Mod: CPTII,S$GLB,, | Performed by: INTERNAL MEDICINE

## 2023-04-18 RX ORDER — DEXAMETHASONE 4 MG/1
8 TABLET ORAL DAILY
Qty: 24 TABLET | Refills: 3 | Status: SHIPPED | OUTPATIENT
Start: 2023-04-19 | End: 2023-08-30 | Stop reason: SDUPTHER

## 2023-04-18 RX ORDER — OLANZAPINE 5 MG/1
5 TABLET ORAL NIGHTLY
Qty: 30 TABLET | Refills: 0 | Status: SHIPPED | OUTPATIENT
Start: 2023-04-18 | End: 2023-08-30 | Stop reason: SDUPTHER

## 2023-04-18 RX ORDER — IBUPROFEN 600 MG/1
600 TABLET ORAL EVERY 8 HOURS PRN
Qty: 20 TABLET | Refills: 0 | Status: SHIPPED | OUTPATIENT
Start: 2023-04-18 | End: 2023-06-29 | Stop reason: SDUPTHER

## 2023-04-18 RX ORDER — ONDANSETRON 8 MG/1
8 TABLET, ORALLY DISINTEGRATING ORAL EVERY 8 HOURS PRN
Qty: 60 TABLET | Refills: 5 | Status: SHIPPED | OUTPATIENT
Start: 2023-04-18 | End: 2023-08-30 | Stop reason: SDUPTHER

## 2023-04-20 ENCOUNTER — CLINICAL SUPPORT (OUTPATIENT)
Dept: REHABILITATION | Facility: HOSPITAL | Age: 48
End: 2023-04-20
Payer: MEDICARE

## 2023-04-20 DIAGNOSIS — Z74.09 IMPAIRED FUNCTIONAL MOBILITY AND ACTIVITY TOLERANCE: ICD-10-CM

## 2023-04-20 DIAGNOSIS — I89.0 LYMPHEDEMA OF RIGHT ARM: Primary | ICD-10-CM

## 2023-04-20 DIAGNOSIS — G89.3 NEOPLASM RELATED PAIN: ICD-10-CM

## 2023-04-20 PROCEDURE — 97140 MANUAL THERAPY 1/> REGIONS: CPT

## 2023-04-20 NOTE — PROGRESS NOTES
Physical Therapy Daily Treatment Note       Date: 4/20/2023   Name: Paul Li Jr.  Clinic Number: [id number]    Therapy Diagnosis:   Encounter Diagnoses   Name Primary?    Lymphedema of right arm Yes    Impaired functional mobility and activity tolerance      Physician: Rosa Linn MD    Physician Orders: PT Eval and Treat -RUE lymphedema  Medical Diagnosis: Malignant neoplasm involving both nipple and areola of right breast in male, estrogen receptor negative [C50.021, Z17.1], Lymphedema of right upper extremity   Evaluation Date: 10/10/2022  Authorization Period Expiration: 3/30/23  Updated Plan of Care Certification Period: 5/5/23  Visit # / Visits authorized: 26 (11/12 newly authorized)   Insurance: [employer]  FOTO: 2/3     Time In: 7:06 AM   Time Out: 8:00 AM  Total Billable Time: 53 minutes     Precautions: Standard, Fall, cancer, and Spine, Bony Mets, hx of Seizures, L chest wall port      Subjective     Pt reports: His compression pump has been working well.  He has been approved for new chemo that will be 1x/week every 3 weeks   He was compliant with self manual lymph drainage  Response to previous treatment: no adverse reaction  Pain Scale: Paul rates pain on a scale of 0/10 on VAS.   Pain location: low back to tailbone    Fatigue: none reported  Functional change: none stated  Treatment:   Chemotherapy: te-adjuvant chemo therapy completed 10/19  Pt is currently on chemotherapy: pt is getting 1 infusion/week for 3 weeks with 1 week break, and he takes oral chemotherapy daily.  Radiation: Completed in February 2020  Surgery date: 12/17/19 pt underwent right axillary lymph node dissection and resection excess lateral tissue; 11/22/19 right simple mastectomy with SLNB; total of 18 lymph nodes removed      Objective     Objective Measures updated at progress report unless specified.     LANDMARK RIGHT UE LEFT UE DIFF RUE Diff RUE  "Diff RUE Diff RUE Diff RUE Diff RUE Diff RUE Diff RUE Diff RUE Diff RUE Diff RUE Diff RUE Diff RUE Diff RUE Diff RUE Diff RUE Diff RUE Diff   Date Eval Eval Eval 10/17/22 10/17/22 10/29/22 10/19/22 11/2/22 11/2/22 11/11/22 11/11/22 11/29/22 11/29/22 12/5/22 12/5/22 12/13/22 12/13/22 12/21/22 12/21/22 12/30/22 12/30/22 1/6/23 1/6/23 1/26/23 1/26/23 2/6/23 2/6/23 3/6/23 3/6/23 3/23/23 3/23/23 3/30/23 3/30/23 4/3/23 4/3/23 4/20/23 4/20/23   E + 8" 50 cm 46.5 cm 3.5 cm 48 cm -2 47.5 cm -0.5 50.5 cm +3 47.5 cm -3 49.5 cm +2 50.5 cm +1 48.5 cm -2 49 cm +0.5 48 cm -1 50 cm +2 48.5 cm -1.5 49 cm +0.5 49 cm No chg 47 cm -2 49 cm +2 48.5 cm -0.5 49.5 cm +1   E + 6" 45.5 cm 44 cm 1.5 cm 45 cm -0.5 46.5 cm +1.5 47 cm +0.5 45 cm -2 45.5 cm +0.5 46 cm +0.5 45 cm -1 45.5 cm +0.5 45.5 cm No chg 47 cm +1.5 45 cm -2 45.5 cm +0.5 46.5 cm +1 44 cm -2.5 46.5 cm +2.5 44.5 cm -2 44.5 cm No chg   E + 4" 40 cm 40.5 cm 0.5 cm 40.5 cm +0.5 40.5cm No chg 41 cm +0.5 40.5 cm -0.5 40.5 cm No chg 40 cm -0.5 41.5 cm +1.5  41 cm -0.5 41 cm No chg 42.5 cm +1.5 40 cm -2.5 41.5 cm +1.5 41.5 cm No chg 41 cm -0.5 41.5 cm +0.5 41.5 cm No chg 40.5 cm -1   E + 2" 38.5 cm 38.5 cm 0 cm 39 cm +0.5 40 cm +1 39.5 cm -0.5 38.5 cm -1 38 cm -0.5 39 cm +1 39 cm No chg 39 cm No chg 39 cm No chg 39.5 cm +0.5 38.5 cm -1 37.5 cm -1 38 cm +0.5 38 cm No chg 39.5 cm +1.5 37.5 cm -2 37 cm -0.5   Elbow 36.5 cm 34 cm 2.5 cm 35.5 cm -1 35.5 cm No chg 36 cm +0.5 34.5 cm -1.5 36 cm +1.5 36 cm No chg 35 cm -1 36 cm +1 35.5 cm -0.5 37.5 cm +2 35 cm -2.5 35 cm No chg 34 cm -1 34.5 cm +0.5 35 cm +0.5 36 cm +1 34.5 cm -1.5   W+ 8" 37.5cm 33 cm 4.5 cm 38cm +0.5 36.5 cm -1.5 37.5 cm +1 36.5 cm -1 37.5 cm +1 37.5 cm No chg 37 cm -0.5 37 cm No chg 37 cm No chg 38 cm +1 37 cm -1 37.5 cm +0.5 36.5 cm -1 36.5 cm No chg 37 cm +0.5 37 cm No chg 35 cm -2   W +  6" 35 cm 29.5 cm 5.5 cm 34 cm -1 34.5 cm +0.5 33.5 cm -1 32.5 cm -1 33 cm +0.5 33 cm No chg 33cm No chg 34.5 cm +1.5 34.5 cm No chg 36 " "cm +1.5 33.5 cm -2.5 34.5 cm +1 34 cm -0.5 33 cm -1 33 cm No chg 34 cm +1 31 cm -3   W + 4" 31.5 cm 25 cm 6.5 cm 31.5 cm No chg 31.5 cm No chg 29.5 cm -2 29 cm -0.5 30 cm +1 28 cm -2 29 cm +1 29 cm No chg 29.5 cm +0.5 32 cm +2.5 30 cm -2 30 cm No chg 29.5 cm -0.5 29 cm +0.5 28 cm -1 29.5 cm +1.5 26.5 cm -3   Wrist 23 cm 20 cm 3 cm 22.5cm -0.5 21.5 cm -1 23 cm +1.5 21 cm -2 24 cm +3 22 cm -2 22 cm No chg 22.5 cm +0.5 23 cm +0.5 23 cm No chg 22 cm -1 21 cm -1 21.5 cm +0.5 22.5 cm +1 21.5 cm -1 21cm -0.5 21 cm No chg   DPC 28.5 cm 26 cm 2.5 cm 28 cm -0.5 27.5cm -0.5 28.5 cm +1 27 cm -1.5 28 cm +1 27 cm -1 26.5 cm -0.5 28 cm +1.5 27.5 cm -0.5 27 cm -0.5 28 cm +1 25.5 cm -2.5 26 cm +0.5 25.5 cm -0.5 26 cm +0.5 26 cm No chg 24.5 cm -1.5   IP Thumb 8.5 cm 8 cm 0.5 cm 8.5 cm No chg 8.5 cm No chg 9 cm +0.5 8.5 cm -0.5 8.5 cm No chg 9 cm +0.5 8.5 cm -0.5 8.5 cm No chg 8.5 cm No chg 9 cm +0.5 9 cm No chg 8.5 cm -0.5 9 cm +0.5 9 cm No chg 8.5 cm -0.5 8 cm -0.5 8.5 cm +0.5          Treatment         Paul received the following manual therapy techniques were performed to increased myofascial/soft tissue length, mobility and pliability, increase PROM, AROM and function as well as to decrease pain for 50 minutes    Measurements taken above    Short Neck- held due to history of seizures  Clear Healthy Lymph Nodes:  Left Axilla                                                  Right Groin  Open Anastomoses:  Anterior Axillo-Axillary  (AAA)                                    Axillo-Inguinal     (AI)                                    Posterior Axillo-Axillary  (GEREMIAS)   J-stroke to right chest wall  Inferior trunk slide  Repeat J-stroke    Right Upper Arm (Lateral and Medial)  Right  Antecubital Fossa  Right Dorsal Forearm  Right Volar Forearm  Dorsum Right Hand       Rework Right UE  Rework Anastomoses, including GEREMIAS  Rework Healthy lymph Nodes      PT applies gauze to right fingers and hand, stockinette, cotton artiflex, and short " stretch bandages to pt's RIGHT/LEFT/BILATERAL: right upper extremity.  Pt educated to wear bandaging until next session unless it causes pain, numbness, or changes in skin color/temperature, or if they start to fall down.  If removed, pt instructed to roll up bandages and cotton padding and bring to next session. If bandages are removed, pt can wear his tubigrip.  Pt has Vet glove to cover bandages in the shower, and we reviewed directions on how to care for bandages. Pt verbalizes understanding of all topics.    Paul received therapeutic exercises to develop ROM, posture, core stabilization, and lymphatic motility for 3 minutes including:     Head turns x 5 reps B  Head tilts x 5 reps B  Shoulder rolls, 10 reps each fwd and back  Scapular retractions x 10        Home Exercise Program and Patient Education   Education provided re:  - role of PT in multi - disciplinary team, goals for PT  - progress towards goals         Written Home Exercises Provided: Patient instructed to cont prior HEP.  Exercises were reviewed and Paul was able to demonstrate them prior to the end of the session.  Paul demonstrated good  understanding of the education provided.     See EMR under Media for self lymphatic drainage provided prior visit.    Pt has no cultural, educational or language barriers to learning provided.    Assessment     Patient tolerated session well. He has been using compression pump 2x/day and demo's substantial reductions in his right arm circumference.Once his measurements stabilize, he will benefit from a compression sleeve and glove.  Will progress as tolerated working towards remaining goals with emphasis on getting compression sleeve and glove.    Pt prognosis is Good. Pt will continue to benefit from skilled outpatient physical therapy to address the deficits listed in the problem list chart on initial evaluation, provide pt/family education and to maximize pt's level of independence in the home and community  environment.     Anticipated barriers to physical therapy: advanced disease    Goals as follows:  Short Term Goals (6 weeks)  1. Pt will demo decrease in girth in right upper extremity by >/= 2cm to allow for improved UE symmetry, clothing choice, ROM, and UE function. (Met, 1/26/23  2. Patient will demonstrate 100% knowledge of lymphedema precautions and signs of infection to allow for reduced risk of lymphedema, reduced risk of infection, and/or exacerbation.  (met)  3. Patient will perform self lymph drainage techniques to enhance lymphatic drainage and mobility.  ( met)  4. Patient will tolerate daily activities with multilayered bandaging to enhance lymphatic drainage and skin elasticity.  (met)  5. Pt will tolerate HEP for improved strength, functional mobility, ROM, posture, and endurance. (met)        Long Term Goals: ( 12  weeks)  1. Patient to show decreased girth in right upper extremity by >/= 3cm to allow for improved UE symmetry, clothing choice, ROM, and UE function.  (progressing, not met)  2. Patient will show reduction in density to mild or less with improved contour of limb to allow for improved cosmesis, improved lymphatic drainage, and functional mobility.  (progressing, not met)  3. Patient to bruna/doff compression garment with daily compliance to support lymphatic mobility and skin elasticity.  (progressing, not met)  4. Pt to show improved postural awareness and alignment for improved functional mobility.  (progressing, not met)  5. Pt to be independent and compliant with HEP to allow for improved ROM, reach and carry with functional endurance and strength.    (met)           Plan   Outpatient physical therapy 2x week for 4 weeks to include the following:   Manual Therapy, Neuromuscular Re-ed, Orthotic Management and Training, Patient Education, Self Care, Therapeutic Activities, and Therapeutic Exercise.     Plan of care Certification Period: 3/6/23 to 4/7/23       Therapist: Katelynn Harrell,  PT  4/20/2023

## 2023-04-21 RX ORDER — HYDROCODONE BITARTRATE AND ACETAMINOPHEN 10; 325 MG/1; MG/1
1 TABLET ORAL EVERY 6 HOURS PRN
Qty: 90 TABLET | Refills: 0 | Status: SHIPPED | OUTPATIENT
Start: 2023-04-21 | End: 2023-05-11 | Stop reason: SDUPTHER

## 2023-04-24 ENCOUNTER — CLINICAL SUPPORT (OUTPATIENT)
Dept: REHABILITATION | Facility: HOSPITAL | Age: 48
End: 2023-04-24
Payer: MEDICARE

## 2023-04-24 DIAGNOSIS — Z74.09 IMPAIRED FUNCTIONAL MOBILITY AND ACTIVITY TOLERANCE: ICD-10-CM

## 2023-04-24 DIAGNOSIS — I89.0 LYMPHEDEMA OF RIGHT ARM: Primary | ICD-10-CM

## 2023-04-24 PROCEDURE — 97110 THERAPEUTIC EXERCISES: CPT

## 2023-04-24 PROCEDURE — 97140 MANUAL THERAPY 1/> REGIONS: CPT

## 2023-04-24 NOTE — PROGRESS NOTES
"                                                    Physical Therapy Daily Treatment Note       Date: 4/24/2023   Name: Paul Li Jr.  Clinic Number: [id number]    Therapy Diagnosis:   Encounter Diagnoses   Name Primary?    Lymphedema of right arm Yes    Impaired functional mobility and activity tolerance      Physician: Rosa Linn MD    Physician Orders: PT Eval and Treat -RUE lymphedema  Medical Diagnosis: Malignant neoplasm involving both nipple and areola of right breast in male, estrogen receptor negative [C50.021, Z17.1], Lymphedema of right upper extremity   Evaluation Date: 10/10/2022  Authorization Period Expiration: 3/30/23  Updated Plan of Care Certification Period: 5/5/23  Visit # / Visits authorized: 27 (12/12 newly authorized)   Insurance: [employer]  FOTO: 2/3     Time In: 8:15 AM  (late arrival)  Time Out: 9:05 AM  Total Billable Time: 50minutes     Precautions: Standard, Fall, cancer, and Spine, Bony Mets, hx of Seizures, L chest wall port      Subjective     Pt reports: Endorses stiffness in his low back and tailbone. "I think I slept funny last night." Denies pain. Supposed to start new chemo this week.   He was compliant with self manual lymph drainage  Response to previous treatment: no adverse reaction  Pain Scale: Paul rates pain on a scale of 0/10 on VAS.   Pain location: low back to tailbone    Fatigue: none reported  Functional change: none stated  Treatment:   Chemotherapy: te-adjuvant chemo therapy completed 10/19  Pt is currently on chemotherapy: pt is getting 1 infusion/week for 3 weeks with 1 week break, and he takes oral chemotherapy daily.  Radiation: Completed in February 2020  Surgery date: 12/17/19 pt underwent right axillary lymph node dissection and resection excess lateral tissue; 11/22/19 right simple mastectomy with SLNB; total of 18 lymph nodes removed      Objective     Objective Measures updated at progress report unless specified.     [place]MARK " "RIGHT UE LEFT UE DIFF RUE Diff RUE Diff RUE Diff RUE Diff RUE Diff RUE Diff RUE Diff RUE Diff RUE Diff RUE Diff RUE Diff RUE Diff RUE Diff RUE Diff RUE Diff RUE Diff RUE Diff   Date Eval Eval Eval 10/17/22 10/17/22 10/29/22 10/19/22 11/2/22 11/2/22 11/11/22 11/11/22 11/29/22 11/29/22 12/5/22 12/5/22 12/13/22 12/13/22 12/21/22 12/21/22 12/30/22 12/30/22 1/6/23 1/6/23 1/26/23 1/26/23 2/6/23 2/6/23 3/6/23 3/6/23 3/23/23 3/23/23 3/30/23 3/30/23 4/3/23 4/3/23 4/20/23 4/20/23   E + 8" 50 cm 46.5 cm 3.5 cm 48 cm -2 47.5 cm -0.5 50.5 cm +3 47.5 cm -3 49.5 cm +2 50.5 cm +1 48.5 cm -2 49 cm +0.5 48 cm -1 50 cm +2 48.5 cm -1.5 49 cm +0.5 49 cm No chg 47 cm -2 49 cm +2 48.5 cm -0.5 49.5 cm +1   E + 6" 45.5 cm 44 cm 1.5 cm 45 cm -0.5 46.5 cm +1.5 47 cm +0.5 45 cm -2 45.5 cm +0.5 46 cm +0.5 45 cm -1 45.5 cm +0.5 45.5 cm No chg 47 cm +1.5 45 cm -2 45.5 cm +0.5 46.5 cm +1 44 cm -2.5 46.5 cm +2.5 44.5 cm -2 44.5 cm No chg   E + 4" 40 cm 40.5 cm 0.5 cm 40.5 cm +0.5 40.5cm No chg 41 cm +0.5 40.5 cm -0.5 40.5 cm No chg 40 cm -0.5 41.5 cm +1.5  41 cm -0.5 41 cm No chg 42.5 cm +1.5 40 cm -2.5 41.5 cm +1.5 41.5 cm No chg 41 cm -0.5 41.5 cm +0.5 41.5 cm No chg 40.5 cm -1   E + 2" 38.5 cm 38.5 cm 0 cm 39 cm +0.5 40 cm +1 39.5 cm -0.5 38.5 cm -1 38 cm -0.5 39 cm +1 39 cm No chg 39 cm No chg 39 cm No chg 39.5 cm +0.5 38.5 cm -1 37.5 cm -1 38 cm +0.5 38 cm No chg 39.5 cm +1.5 37.5 cm -2 37 cm -0.5   Elbow 36.5 cm 34 cm 2.5 cm 35.5 cm -1 35.5 cm No chg 36 cm +0.5 34.5 cm -1.5 36 cm +1.5 36 cm No chg 35 cm -1 36 cm +1 35.5 cm -0.5 37.5 cm +2 35 cm -2.5 35 cm No chg 34 cm -1 34.5 cm +0.5 35 cm +0.5 36 cm +1 34.5 cm -1.5   W+ 8" 37.5cm 33 cm 4.5 cm 38cm +0.5 36.5 cm -1.5 37.5 cm +1 36.5 cm -1 37.5 cm +1 37.5 cm No chg 37 cm -0.5 37 cm No chg 37 cm No chg 38 cm +1 37 cm -1 37.5 cm +0.5 36.5 cm -1 36.5 cm No chg 37 cm +0.5 37 cm No chg 35 cm -2   W +  6" 35 cm 29.5 cm 5.5 cm 34 cm -1 34.5 cm +0.5 33.5 cm -1 32.5 cm -1 33 cm +0.5 33 cm No chg 33cm No " "chg 34.5 cm +1.5 34.5 cm No chg 36 cm +1.5 33.5 cm -2.5 34.5 cm +1 34 cm -0.5 33 cm -1 33 cm No chg 34 cm +1 31 cm -3   W + 4" 31.5 cm 25 cm 6.5 cm 31.5 cm No chg 31.5 cm No chg 29.5 cm -2 29 cm -0.5 30 cm +1 28 cm -2 29 cm +1 29 cm No chg 29.5 cm +0.5 32 cm +2.5 30 cm -2 30 cm No chg 29.5 cm -0.5 29 cm +0.5 28 cm -1 29.5 cm +1.5 26.5 cm -3   Wrist 23 cm 20 cm 3 cm 22.5cm -0.5 21.5 cm -1 23 cm +1.5 21 cm -2 24 cm +3 22 cm -2 22 cm No chg 22.5 cm +0.5 23 cm +0.5 23 cm No chg 22 cm -1 21 cm -1 21.5 cm +0.5 22.5 cm +1 21.5 cm -1 21cm -0.5 21 cm No chg   DPC 28.5 cm 26 cm 2.5 cm 28 cm -0.5 27.5cm -0.5 28.5 cm +1 27 cm -1.5 28 cm +1 27 cm -1 26.5 cm -0.5 28 cm +1.5 27.5 cm -0.5 27 cm -0.5 28 cm +1 25.5 cm -2.5 26 cm +0.5 25.5 cm -0.5 26 cm +0.5 26 cm No chg 24.5 cm -1.5   IP Thumb 8.5 cm 8 cm 0.5 cm 8.5 cm No chg 8.5 cm No chg 9 cm +0.5 8.5 cm -0.5 8.5 cm No chg 9 cm +0.5 8.5 cm -0.5 8.5 cm No chg 8.5 cm No chg 9 cm +0.5 9 cm No chg 8.5 cm -0.5 9 cm +0.5 9 cm No chg 8.5 cm -0.5 8 cm -0.5 8.5 cm +0.5          Treatment         Paul received the following manual therapy techniques were performed to increased myofascial/soft tissue length, mobility and pliability, increase PROM, AROM and function as well as to decrease pain for 42 minutes        Short Neck- held due to history of seizures  Clear Healthy Lymph Nodes:  Left Axilla                                                  Right Groin  Open Anastomoses:  Anterior Axillo-Axillary  (AAA)                                    Axillo-Inguinal     (AI)                                    Posterior Axillo-Axillary  (GEREMIAS)   J-stroke to right chest wall  Inferior trunk slide  Repeat J-stroke    Right Upper Arm (Lateral and Medial)  Right  Antecubital Fossa  Right Dorsal Forearm  Right Volar Forearm  Dorsum Right Hand       Rework Right UE  Rework Anastomoses, including GEREMIAS  Rework Healthy lymph Nodes      PT applies gauze to right fingers and hand, stockinette, cotton artiflex, and " short stretch bandages to pt's RIGHT/LEFT/BILATERAL: right upper extremity.  Pt educated to wear bandaging until next session unless it causes pain, numbness, or changes in skin color/temperature, or if they start to fall down.  If removed, pt instructed to roll up bandages and cotton padding and bring to next session. If bandages are removed, pt can wear his tubigrip.  Pt has Vet glove to cover bandages in the shower, and we reviewed directions on how to care for bandages. Pt verbalizes understanding of all topics.    Paul received therapeutic exercises to develop ROM, posture, core stabilization, and lymphatic motility for 8 minutes including:     Diaphragmatic breathing, 2 x 5 reps    Head turns x 5 reps B  Head tilts x 5 reps B  Shoulder rolls, 10 reps each fwd and back  Scapular retractions x 10        Home Exercise Program and Patient Education   Education provided re:  - role of PT in multi - disciplinary team, goals for PT  - progress towards goals         Written Home Exercises Provided: Patient instructed to cont prior HEP.  Exercises were reviewed and Paul was able to demonstrate them prior to the end of the session.  Paul demonstrated good  understanding of the education provided.     See EMR under Media for self lymphatic drainage provided prior visit.    Pt has no cultural, educational or language barriers to learning provided.    Assessment     Patient tolerated session well, though limited slightly due to late arrival. He is compliant with compression pump, manual lymph drainage. Once his measurements stabilize, he will benefit from a compression sleeve and glove.  Will progress as tolerated working towards remaining goals with emphasis on getting compression sleeve and glove.    Pt prognosis is Good. Pt will continue to benefit from skilled outpatient physical therapy to address the deficits listed in the problem list chart on initial evaluation, provide pt/family education and to maximize pt's  level of independence in the home and community environment.     Anticipated barriers to physical therapy: advanced disease    Goals as follows:  Short Term Goals (6 weeks)  1. Pt will demo decrease in girth in right upper extremity by >/= 2cm to allow for improved UE symmetry, clothing choice, ROM, and UE function. (Met, 1/26/23  2. Patient will demonstrate 100% knowledge of lymphedema precautions and signs of infection to allow for reduced risk of lymphedema, reduced risk of infection, and/or exacerbation.  (met)  3. Patient will perform self lymph drainage techniques to enhance lymphatic drainage and mobility.  ( met)  4. Patient will tolerate daily activities with multilayered bandaging to enhance lymphatic drainage and skin elasticity.  (met)  5. Pt will tolerate HEP for improved strength, functional mobility, ROM, posture, and endurance. (met)        Long Term Goals: ( 12  weeks)  1. Patient to show decreased girth in right upper extremity by >/= 3cm to allow for improved UE symmetry, clothing choice, ROM, and UE function.  (progressing, not met)  2. Patient will show reduction in density to mild or less with improved contour of limb to allow for improved cosmesis, improved lymphatic drainage, and functional mobility.  (progressing, not met)  3. Patient to bruna/doff compression garment with daily compliance to support lymphatic mobility and skin elasticity.  (progressing, not met)  4. Pt to show improved postural awareness and alignment for improved functional mobility.  (progressing, not met)  5. Pt to be independent and compliant with HEP to allow for improved ROM, reach and carry with functional endurance and strength.    (met)           Plan   Outpatient physical therapy 2x week for 4 weeks to include the following:   Manual Therapy, Neuromuscular Re-ed, Orthotic Management and Training, Patient Education, Self Care, Therapeutic Activities, and Therapeutic Exercise.     Plan of care Certification Period:  3/6/23 to 4/7/23       Therapist: Katelynn Harrell, PT  4/24/2023

## 2023-04-25 ENCOUNTER — PATIENT OUTREACH (OUTPATIENT)
Dept: ADMINISTRATIVE | Facility: HOSPITAL | Age: 48
End: 2023-04-25
Payer: MEDICARE

## 2023-04-25 ENCOUNTER — PATIENT MESSAGE (OUTPATIENT)
Dept: HEMATOLOGY/ONCOLOGY | Facility: CLINIC | Age: 48
End: 2023-04-25
Payer: MEDICARE

## 2023-04-25 NOTE — PROGRESS NOTES
Health Maintenance Due   Topic Date Due    Pneumococcal Vaccines (Age 0-64) (1 - PCV) Never done    Low Dose Statin  Never done    Foot Exam  11/06/2020    Diabetes Urine Screening  01/02/2021    COVID-19 Vaccine (2 - Booster for [place] series) 09/27/2021    Influenza Vaccine (1) Never done    Colorectal Cancer Screening  Never done     Chart reviewed.   Immunizations: Reconciled  Orders placed: N/A  Upcoming appts to satisfy KALEIGH topics: N/A

## 2023-04-25 NOTE — TELEPHONE ENCOUNTER
Called patient to discuss pain. Feels gabapentin not working as well. Taking 300mg 1 tab TID. Numbness and tingling in back of leg and feet and left buttock- comes and goes and noted more when he did his last treatment.   Back pain/tailbone no worse than usual- IBP relieves.   No trips or falls.   Norco - taking 2-3 a day.   Plan to increase gabapentin 300mg 3 tabs TID.   Offered Rad Onc referral.    Will message me in 1-2 days with update.   PJ

## 2023-04-26 ENCOUNTER — TELEPHONE (OUTPATIENT)
Dept: HEMATOLOGY/ONCOLOGY | Facility: CLINIC | Age: 48
End: 2023-04-26
Payer: MEDICARE

## 2023-04-26 NOTE — TELEPHONE ENCOUNTER
MD Richard Bruno Jr., LAUREEN; Rosa Linn MD  Good morning   I looked at the mri and Pet, diffuse bone disease but no involvement of the sacral canal or sacral foramina.  We could consider radiotherapy to the sacrum including the SI joints.   We would knock out his bone marrow in this area and some of the pelvis   Speedy

## 2023-04-27 ENCOUNTER — TELEPHONE (OUTPATIENT)
Dept: HEMATOLOGY/ONCOLOGY | Facility: CLINIC | Age: 48
End: 2023-04-27
Payer: MEDICARE

## 2023-04-27 ENCOUNTER — CLINICAL SUPPORT (OUTPATIENT)
Dept: REHABILITATION | Facility: HOSPITAL | Age: 48
End: 2023-04-27
Payer: MEDICARE

## 2023-04-27 DIAGNOSIS — I89.0 LYMPHEDEMA OF RIGHT ARM: Primary | ICD-10-CM

## 2023-04-27 DIAGNOSIS — Z74.09 IMPAIRED FUNCTIONAL MOBILITY AND ACTIVITY TOLERANCE: ICD-10-CM

## 2023-04-27 PROCEDURE — 97535 SELF CARE MNGMENT TRAINING: CPT

## 2023-04-27 PROCEDURE — 97140 MANUAL THERAPY 1/> REGIONS: CPT

## 2023-04-27 NOTE — PROGRESS NOTES
"                                                    Physical Therapy Daily Treatment Note       Date: 4/27/2023   Name: Paul Li Jr.  Clinic Number: [id number]    Therapy Diagnosis:   Encounter Diagnoses   Name Primary?    Lymphedema of right arm Yes    Impaired functional mobility and activity tolerance      Physician: Rosa Linn MD    Physician Orders: PT Eval and Treat -RUE lymphedema  Medical Diagnosis: Malignant neoplasm involving both nipple and areola of right breast in male, estrogen receptor negative [C50.021, Z17.1], Lymphedema of right upper extremity   Evaluation Date: 10/10/2022  Authorization Period Expiration: 3/30/23  Updated Plan of Care Certification Period: 5/5/23  Visit # / Visits authorized: 28 (13/24 newly authorized)   Insurance: [employer]  FOTO: 2/3     Time In: 8:15 AM  (late arrival)  Time Out: 9:25 AM  Total Billable Time: 70 minutes     Precautions: Standard, Fall, cancer, and Spine, Bony Mets, hx of Seizures, L chest wall port      Subjective     Pt reports: "I feel good today."   He was compliant with self manual lymph drainage  Response to previous treatment: no adverse reaction  Pain Scale: Paul rates pain on a scale of 0/10 on VAS.   Pain location: low back to tailbone    Fatigue: none reported  Functional change: none stated  Treatment:   Chemotherapy: te-adjuvant chemo therapy completed 10/19  Pt is currently on chemotherapy: pt is getting 1 infusion/week for 3 weeks with 1 week break, and he takes oral chemotherapy daily.  Radiation: Completed in February 2020  Surgery date: 12/17/19 pt underwent right axillary lymph node dissection and resection excess lateral tissue; 11/22/19 right simple mastectomy with SLNB; total of 18 lymph nodes removed      Objective     Objective Measures updated at progress report unless specified.     LANDMARK RIGHT UE LEFT UE DIFF RUE Diff RUE Diff RUE Diff RUE Diff RUE Diff RUE Diff RUE Diff RUE Diff RUE Diff RUE Diff RUE " "Diff RUE Diff RUE Diff RUE Diff RUE Diff RUE Diff RUE Diff RUE Diff   Date Eval Eval Eval 10/17/22 10/17/22 10/29/22 10/19/22 11/2/22 11/2/22 11/11/22 11/11/22 11/29/22 11/29/22 12/5/22 12/5/22 12/13/22 12/13/22 12/21/22 12/21/22 12/30/22 12/30/22 1/6/23 1/6/23 1/26/23 1/26/23 2/6/23 2/6/23 3/6/23 3/6/23 3/23/23 3/23/23 3/30/23 3/30/23 4/3/23 4/3/23 4/20/23 4/20/23 4/27/23 4/27/23   E + 8" 50 cm 46.5 cm 3.5 cm 48 cm -2 47.5 cm -0.5 50.5 cm +3 47.5 cm -3 49.5 cm +2 50.5 cm +1 48.5 cm -2 49 cm +0.5 48 cm -1 50 cm +2 48.5 cm -1.5 49 cm +0.5 49 cm No chg 47 cm -2 49 cm +2 48.5 cm -0.5 49.5 cm +1 50 cm +0.5   E + 6" 45.5 cm 44 cm 1.5 cm 45 cm -0.5 46.5 cm +1.5 47 cm +0.5 45 cm -2 45.5 cm +0.5 46 cm +0.5 45 cm -1 45.5 cm +0.5 45.5 cm No chg 47 cm +1.5 45 cm -2 45.5 cm +0.5 46.5 cm +1 44 cm -2.5 46.5 cm +2.5 44.5 cm -2 44.5 cm No chg 46.5 cm +2   E + 4" 40 cm 40.5 cm 0.5 cm 40.5 cm +0.5 40.5cm No chg 41 cm +0.5 40.5 cm -0.5 40.5 cm No chg 40 cm -0.5 41.5 cm +1.5  41 cm -0.5 41 cm No chg 42.5 cm +1.5 40 cm -2.5 41.5 cm +1.5 41.5 cm No chg 41 cm -0.5 41.5 cm +0.5 41.5 cm No chg 40.5 cm -1 43 cm +2.5   E + 2" 38.5 cm 38.5 cm 0 cm 39 cm +0.5 40 cm +1 39.5 cm -0.5 38.5 cm -1 38 cm -0.5 39 cm +1 39 cm No chg 39 cm No chg 39 cm No chg 39.5 cm +0.5 38.5 cm -1 37.5 cm -1 38 cm +0.5 38 cm No chg 39.5 cm +1.5 37.5 cm -2 37 cm -0.5 40.5 cm +3.5   Elbow 36.5 cm 34 cm 2.5 cm 35.5 cm -1 35.5 cm No chg 36 cm +0.5 34.5 cm -1.5 36 cm +1.5 36 cm No chg 35 cm -1 36 cm +1 35.5 cm -0.5 37.5 cm +2 35 cm -2.5 35 cm No chg 34 cm -1 34.5 cm +0.5 35 cm +0.5 36 cm +1 34.5 cm -1.5 36 cm +1.5   W+ 8" 37.5cm 33 cm 4.5 cm 38cm +0.5 36.5 cm -1.5 37.5 cm +1 36.5 cm -1 37.5 cm +1 37.5 cm No chg 37 cm -0.5 37 cm No chg 37 cm No chg 38 cm +1 37 cm -1 37.5 cm +0.5 36.5 cm -1 36.5 cm No chg 37 cm +0.5 37 cm No chg 35 cm -2 37.5 cm +2.5   W +  6" 35 cm 29.5 cm 5.5 cm 34 cm -1 34.5 cm +0.5 33.5 cm -1 32.5 cm -1 33 cm +0.5 33 cm No chg 33cm No chg 34.5 cm +1.5 " "34.5 cm No chg 36 cm +1.5 33.5 cm -2.5 34.5 cm +1 34 cm -0.5 33 cm -1 33 cm No chg 34 cm +1 31 cm -3 34 cm +3   W + 4" 31.5 cm 25 cm 6.5 cm 31.5 cm No chg 31.5 cm No chg 29.5 cm -2 29 cm -0.5 30 cm +1 28 cm -2 29 cm +1 29 cm No chg 29.5 cm +0.5 32 cm +2.5 30 cm -2 30 cm No chg 29.5 cm -0.5 29 cm +0.5 28 cm -1 29.5 cm +1.5 26.5 cm -3 29 cm +2.5   Wrist 23 cm 20 cm 3 cm 22.5cm -0.5 21.5 cm -1 23 cm +1.5 21 cm -2 24 cm +3 22 cm -2 22 cm No chg 22.5 cm +0.5 23 cm +0.5 23 cm No chg 22 cm -1 21 cm -1 21.5 cm +0.5 22.5 cm +1 21.5 cm -1 21cm -0.5 21 cm No chg 20 cm -1   DPC 28.5 cm 26 cm 2.5 cm 28 cm -0.5 27.5cm -0.5 28.5 cm +1 27 cm -1.5 28 cm +1 27 cm -1 26.5 cm -0.5 28 cm +1.5 27.5 cm -0.5 27 cm -0.5 28 cm +1 25.5 cm -2.5 26 cm +0.5 25.5 cm -0.5 26 cm +0.5 26 cm No chg 24.5 cm -1.5 25 cm +0.5   IP Thumb 8.5 cm 8 cm 0.5 cm 8.5 cm No chg 8.5 cm No chg 9 cm +0.5 8.5 cm -0.5 8.5 cm No chg 9 cm +0.5 8.5 cm -0.5 8.5 cm No chg 8.5 cm No chg 9 cm +0.5 9 cm No chg 8.5 cm -0.5 9 cm +0.5 9 cm No chg 8.5 cm -0.5 8 cm -0.5 8.5 cm +0.5 8.5 cm No chg          Treatment         Paul received the following manual therapy techniques were performed to increased myofascial/soft tissue length, mobility and pliability, increase PROM, AROM and function as well as to decrease pain for 55 minutes    Measurements taken above    Diaphragmatic breathing x 5 reps    Short Neck- held due to history of seizures  Clear Healthy Lymph Nodes:  Left Axilla                                                  Right Groin  Open Anastomoses:  Anterior Axillo-Axillary  (AAA)                                    Axillo-Inguinal     (AI)                                    Posterior Axillo-Axillary  (GEREMIAS)     Right Upper Arm (Lateral and Medial)  Right  Antecubital Fossa  Right Dorsal Forearm  Right Volar Forearm  Dorsum Right Hand       Rework Right UE  Rework Anastomoses  Rework Healthy lymph Nodes      PT applies gauze to right fingers and hand, stockinette, cotton " artiflex, and short stretch bandages to pt's RIGHT/LEFT/BILATERAL: right upper extremity.  Pt educated to wear bandaging until next session unless it causes pain, numbness, or changes in skin color/temperature, or if they start to fall down.  If removed, pt instructed to roll up bandages and cotton padding and bring to next session. If bandages are removed, pt can wear his tubigrip.  Pt has Vet glove to cover bandages in the shower, and we reviewed directions on how to care for bandages. Pt verbalizes understanding of all topics.    Pt participates in self-care/home management for 15 minutes:    Pt provided with new piece of size F tubigrip for right arm    Encouraged pt to try self manual lymph drainage while wearing bandages to preserve length of wearing compression bandages. When bandages come loose, PT advised pt to use pneumatic pump, and then put tubigrip on.          Home Exercise Program and Patient Education   Education provided re:  - role of PT in multi - disciplinary team, goals for PT  - progress towards goals         Written Home Exercises Provided: Patient instructed to cont prior HEP.  Exercises were reviewed and Paul was able to demonstrate them prior to the end of the session.  Paul demonstrated good  understanding of the education provided.     See EMR under Media for self lymphatic drainage provided prior visit.    Pt has no cultural, educational or language barriers to learning provided.    Assessment     Patient tolerated session well. Increased time to doff compression bandages today. He is compliant with compression pump, manual lymph drainage, and use of compression bandages. He presented with bandages that had fallen down, so this likely accounts for increase in his measurements. PT gave guidance for how pt may prolong life of wearing bandages by using self MLD with bandages on and compression pump with bandages off. Will follow up at next session to gauge response. Once his measurements  stabilize, he will benefit from a compression sleeve and glove.  Will progress as tolerated working towards remaining goals with emphasis on getting compression sleeve and glove.    Pt prognosis is Good. Pt will continue to benefit from skilled outpatient physical therapy to address the deficits listed in the problem list chart on initial evaluation, provide pt/family education and to maximize pt's level of independence in the home and community environment.     Anticipated barriers to physical therapy: advanced disease    Goals as follows:  Short Term Goals (6 weeks)  1. Pt will demo decrease in girth in right upper extremity by >/= 2cm to allow for improved UE symmetry, clothing choice, ROM, and UE function. (Met, 1/26/23  2. Patient will demonstrate 100% knowledge of lymphedema precautions and signs of infection to allow for reduced risk of lymphedema, reduced risk of infection, and/or exacerbation.  (met)  3. Patient will perform self lymph drainage techniques to enhance lymphatic drainage and mobility.  ( met)  4. Patient will tolerate daily activities with multilayered bandaging to enhance lymphatic drainage and skin elasticity.  (met)  5. Pt will tolerate HEP for improved strength, functional mobility, ROM, posture, and endurance. (met)        Long Term Goals: ( 12  weeks)  1. Patient to show decreased girth in right upper extremity by >/= 3cm to allow for improved UE symmetry, clothing choice, ROM, and UE function.  (progressing, not met)  2. Patient will show reduction in density to mild or less with improved contour of limb to allow for improved cosmesis, improved lymphatic drainage, and functional mobility.  (progressing, not met)  3. Patient to bruna/doff compression garment with daily compliance to support lymphatic mobility and skin elasticity.  (progressing, not met)  4. Pt to show improved postural awareness and alignment for improved functional mobility.  (progressing, not met)  5. Pt to be  independent and compliant with HEP to allow for improved ROM, reach and carry with functional endurance and strength.    (met)           Plan   Outpatient physical therapy 2x week for 4 weeks to include the following:   Manual Therapy, Neuromuscular Re-ed, Orthotic Management and Training, Patient Education, Self Care, Therapeutic Activities, and Therapeutic Exercise.     Plan of care Certification Period: 3/6/23 to 4/7/23       Therapist: Katelynn Harrell, PT  4/27/2023

## 2023-04-29 ENCOUNTER — PATIENT MESSAGE (OUTPATIENT)
Dept: HEMATOLOGY/ONCOLOGY | Facility: CLINIC | Age: 48
End: 2023-04-29
Payer: MEDICARE

## 2023-05-01 ENCOUNTER — PATIENT MESSAGE (OUTPATIENT)
Dept: REHABILITATION | Facility: HOSPITAL | Age: 48
End: 2023-05-01

## 2023-05-01 ENCOUNTER — CLINICAL SUPPORT (OUTPATIENT)
Dept: REHABILITATION | Facility: HOSPITAL | Age: 48
End: 2023-05-01
Payer: MEDICARE

## 2023-05-01 DIAGNOSIS — Z74.09 IMPAIRED FUNCTIONAL MOBILITY AND ACTIVITY TOLERANCE: ICD-10-CM

## 2023-05-01 DIAGNOSIS — I89.0 LYMPHEDEMA OF RIGHT ARM: Primary | ICD-10-CM

## 2023-05-01 PROCEDURE — 97140 MANUAL THERAPY 1/> REGIONS: CPT | Mod: KX

## 2023-05-01 PROCEDURE — 97535 SELF CARE MNGMENT TRAINING: CPT | Mod: KX

## 2023-05-01 NOTE — PROGRESS NOTES
Physical Therapy Daily Treatment Note       Date: 5/1/2023   Name: Paul Li Jr.  Clinic Number: [id number]    Therapy Diagnosis:   Encounter Diagnoses   Name Primary?    Lymphedema of right arm Yes    Impaired functional mobility and activity tolerance      Physician: Rosa Linn MD    Physician Orders: PT Eval and Treat -RUE lymphedema  Medical Diagnosis: Malignant neoplasm involving both nipple and areola of right breast in male, estrogen receptor negative [C50.021, Z17.1], Lymphedema of right upper extremity   Evaluation Date: 10/10/2022  Authorization Period Expiration: 3/30/23  Updated Plan of Care Certification Period: 5/5/23  Visit # / Visits authorized: 29 (15/24 newly authorized)   Insurance: [employer]  FOTO: 2/3     Time In: 8:11 AM  (late arrival)  Time Out: 9:10 AM  Total Billable Time: 59 minutes     Precautions: Standard, Fall, cancer, and Spine, Bony Mets, hx of Seizures, L chest wall port      Subjective     Pt reports: No new complaints. He had a good weekend. His bandages came down with using the compression pump   He was compliant with self manual lymph drainage  Response to previous treatment: no adverse reaction  Pain Scale: Paul rates pain on a scale of 0/10 on VAS.   Pain location: low back to tailbone    Fatigue: none reported  Functional change: none stated  Treatment:   Chemotherapy: te-adjuvant chemo therapy completed 10/19  Pt is currently on chemotherapy: pt is getting 1 infusion/week for 3 weeks with 1 week break, and he takes oral chemotherapy daily.  Radiation: Completed in February 2020  Surgery date: 12/17/19 pt underwent right axillary lymph node dissection and resection excess lateral tissue; 11/22/19 right simple mastectomy with SLNB; total of 18 lymph nodes removed      Objective     Objective Measures updated at progress report unless specified.     [place] RIGHT UE LEFT UE DIFF RUE Diff RUE Diff  "RUE Diff RUE Diff RUE Diff RUE Diff RUE Diff RUE Diff RUE Diff RUE Diff RUE Diff RUE Diff RUE Diff RUE Diff RUE Diff RUE Diff RUE Diff RUE Diff   Date Eval Eval Eval 10/17/22 10/17/22 10/29/22 10/19/22 11/2/22 11/2/22 11/11/22 11/11/22 11/29/22 11/29/22 12/5/22 12/5/22 12/13/22 12/13/22 12/21/22 12/21/22 12/30/22 12/30/22 1/6/23 1/6/23 1/26/23 1/26/23 2/6/23 2/6/23 3/6/23 3/6/23 3/23/23 3/23/23 3/30/23 3/30/23 4/3/23 4/3/23 4/20/23 4/20/23 4/27/23 4/27/23   E + 8" 50 cm 46.5 cm 3.5 cm 48 cm -2 47.5 cm -0.5 50.5 cm +3 47.5 cm -3 49.5 cm +2 50.5 cm +1 48.5 cm -2 49 cm +0.5 48 cm -1 50 cm +2 48.5 cm -1.5 49 cm +0.5 49 cm No chg 47 cm -2 49 cm +2 48.5 cm -0.5 49.5 cm +1 50 cm +0.5   E + 6" 45.5 cm 44 cm 1.5 cm 45 cm -0.5 46.5 cm +1.5 47 cm +0.5 45 cm -2 45.5 cm +0.5 46 cm +0.5 45 cm -1 45.5 cm +0.5 45.5 cm No chg 47 cm +1.5 45 cm -2 45.5 cm +0.5 46.5 cm +1 44 cm -2.5 46.5 cm +2.5 44.5 cm -2 44.5 cm No chg 46.5 cm +2   E + 4" 40 cm 40.5 cm 0.5 cm 40.5 cm +0.5 40.5cm No chg 41 cm +0.5 40.5 cm -0.5 40.5 cm No chg 40 cm -0.5 41.5 cm +1.5  41 cm -0.5 41 cm No chg 42.5 cm +1.5 40 cm -2.5 41.5 cm +1.5 41.5 cm No chg 41 cm -0.5 41.5 cm +0.5 41.5 cm No chg 40.5 cm -1 43 cm +2.5   E + 2" 38.5 cm 38.5 cm 0 cm 39 cm +0.5 40 cm +1 39.5 cm -0.5 38.5 cm -1 38 cm -0.5 39 cm +1 39 cm No chg 39 cm No chg 39 cm No chg 39.5 cm +0.5 38.5 cm -1 37.5 cm -1 38 cm +0.5 38 cm No chg 39.5 cm +1.5 37.5 cm -2 37 cm -0.5 40.5 cm +3.5   Elbow 36.5 cm 34 cm 2.5 cm 35.5 cm -1 35.5 cm No chg 36 cm +0.5 34.5 cm -1.5 36 cm +1.5 36 cm No chg 35 cm -1 36 cm +1 35.5 cm -0.5 37.5 cm +2 35 cm -2.5 35 cm No chg 34 cm -1 34.5 cm +0.5 35 cm +0.5 36 cm +1 34.5 cm -1.5 36 cm +1.5   W+ 8" 37.5cm 33 cm 4.5 cm 38cm +0.5 36.5 cm -1.5 37.5 cm +1 36.5 cm -1 37.5 cm +1 37.5 cm No chg 37 cm -0.5 37 cm No chg 37 cm No chg 38 cm +1 37 cm -1 37.5 cm +0.5 36.5 cm -1 36.5 cm No chg 37 cm +0.5 37 cm No chg 35 cm -2 37.5 cm +2.5   W +  6" 35 cm 29.5 cm 5.5 cm 34 cm -1 34.5 cm " "+0.5 33.5 cm -1 32.5 cm -1 33 cm +0.5 33 cm No chg 33cm No chg 34.5 cm +1.5 34.5 cm No chg 36 cm +1.5 33.5 cm -2.5 34.5 cm +1 34 cm -0.5 33 cm -1 33 cm No chg 34 cm +1 31 cm -3 34 cm +3   W + 4" 31.5 cm 25 cm 6.5 cm 31.5 cm No chg 31.5 cm No chg 29.5 cm -2 29 cm -0.5 30 cm +1 28 cm -2 29 cm +1 29 cm No chg 29.5 cm +0.5 32 cm +2.5 30 cm -2 30 cm No chg 29.5 cm -0.5 29 cm +0.5 28 cm -1 29.5 cm +1.5 26.5 cm -3 29 cm +2.5   Wrist 23 cm 20 cm 3 cm 22.5cm -0.5 21.5 cm -1 23 cm +1.5 21 cm -2 24 cm +3 22 cm -2 22 cm No chg 22.5 cm +0.5 23 cm +0.5 23 cm No chg 22 cm -1 21 cm -1 21.5 cm +0.5 22.5 cm +1 21.5 cm -1 21cm -0.5 21 cm No chg 20 cm -1   DPC 28.5 cm 26 cm 2.5 cm 28 cm -0.5 27.5cm -0.5 28.5 cm +1 27 cm -1.5 28 cm +1 27 cm -1 26.5 cm -0.5 28 cm +1.5 27.5 cm -0.5 27 cm -0.5 28 cm +1 25.5 cm -2.5 26 cm +0.5 25.5 cm -0.5 26 cm +0.5 26 cm No chg 24.5 cm -1.5 25 cm +0.5   IP Thumb 8.5 cm 8 cm 0.5 cm 8.5 cm No chg 8.5 cm No chg 9 cm +0.5 8.5 cm -0.5 8.5 cm No chg 9 cm +0.5 8.5 cm -0.5 8.5 cm No chg 8.5 cm No chg 9 cm +0.5 9 cm No chg 8.5 cm -0.5 9 cm +0.5 9 cm No chg 8.5 cm -0.5 8 cm -0.5 8.5 cm +0.5 8.5 cm No chg          Treatment         Paul received the following manual therapy techniques were performed to increased myofascial/soft tissue length, mobility and pliability, increase PROM, AROM and function as well as to decrease pain for  44 minutes      Diaphragmatic breathing x 5 reps    Short Neck- held due to history of seizures  Clear Healthy Lymph Nodes:  Left Axilla                                                  Right Groin  Open Anastomoses:  Anterior Axillo-Axillary  (AAA)                                    Axillo-Inguinal     (AI)                                    Posterior Axillo-Axillary  (GEREMIAS)     Right Upper Arm (Lateral and Medial)  Right  Antecubital Fossa  Right Dorsal Forearm  Right Volar Forearm  Dorsum Right Hand       Rework Right UE  Rework Anastomoses  Rework Healthy lymph Nodes      PT applies " gauze to right fingers and hand, stockinette, cotton artiflex, and short stretch bandages to pt's RIGHT/LEFT/BILATERAL: right upper extremity.  Pt educated to wear bandaging until next session unless it causes pain, numbness, or changes in skin color/temperature, or if they start to fall down.  If removed, pt instructed to roll up bandages and cotton padding and bring to next session. If bandages are removed, pt can wear his tubigrip.  Pt has Vet glove to cover bandages in the shower, and we reviewed directions on how to care for bandages. Pt verbalizes understanding of all topics.    Pt participates in self-care/home management for 15 minutes:    PT taught self-bandaging technique and provided pt with handout of list of directions. Pt demo's and verbalizes understanding, but he may need review.    Reviewed potential benefit of alternating between using compression pump and performing self manual lymph drainage at home.      Home Exercise Program and Patient Education   Education provided re:  - role of PT in multi - disciplinary team, goals for PT  - progress towards goals         Written Home Exercises Provided: Patient instructed to cont prior HEP.  Exercises were reviewed and Paul was able to demonstrate them prior to the end of the session.  Paul demonstrated good  understanding of the education provided.     See EMR under Media for self lymphatic drainage provided prior visit.    Pt has no cultural, educational or language barriers to learning provided.    Assessment     Patient tolerated session well.  His arm appears smaller today, but we will measure at next session. Increased time spent reviewing self bandaging technique, so he can wrap himself if they fall down. Once his measurements stabilize, he will benefit from a compression sleeve and glove.  Will progress as tolerated working towards remaining goals with emphasis on getting compression sleeve and glove.    Pt prognosis is Good. Pt will continue to  benefit from skilled outpatient physical therapy to address the deficits listed in the problem list chart on initial evaluation, provide pt/family education and to maximize pt's level of independence in the home and community environment.     Anticipated barriers to physical therapy: advanced disease    Goals as follows:  Short Term Goals (6 weeks)  1. Pt will demo decrease in girth in right upper extremity by >/= 2cm to allow for improved UE symmetry, clothing choice, ROM, and UE function. (Met, 1/26/23  2. Patient will demonstrate 100% knowledge of lymphedema precautions and signs of infection to allow for reduced risk of lymphedema, reduced risk of infection, and/or exacerbation.  (met)  3. Patient will perform self lymph drainage techniques to enhance lymphatic drainage and mobility.  ( met)  4. Patient will tolerate daily activities with multilayered bandaging to enhance lymphatic drainage and skin elasticity.  (met)  5. Pt will tolerate HEP for improved strength, functional mobility, ROM, posture, and endurance. (met)        Long Term Goals: ( 12  weeks)  1. Patient to show decreased girth in right upper extremity by >/= 3cm to allow for improved UE symmetry, clothing choice, ROM, and UE function.  (progressing, not met)  2. Patient will show reduction in density to mild or less with improved contour of limb to allow for improved cosmesis, improved lymphatic drainage, and functional mobility.  (progressing, not met)  3. Patient to bruna/doff compression garment with daily compliance to support lymphatic mobility and skin elasticity.  (progressing, not met)  4. Pt to show improved postural awareness and alignment for improved functional mobility.  (progressing, not met)  5. Pt to be independent and compliant with HEP to allow for improved ROM, reach and carry with functional endurance and strength.    (met)           Plan   Outpatient physical therapy 2x week for 4 weeks to include the following:   Manual  Therapy, Neuromuscular Re-ed, Orthotic Management and Training, Patient Education, Self Care, Therapeutic Activities, and Therapeutic Exercise.     Plan of care Certification Period: 3/6/23 to 4/7/23       Therapist: Katelynn Harrell, PT  5/1/2023

## 2023-05-02 ENCOUNTER — PATIENT MESSAGE (OUTPATIENT)
Dept: HEMATOLOGY/ONCOLOGY | Facility: CLINIC | Age: 48
End: 2023-05-02
Payer: MEDICARE

## 2023-05-04 ENCOUNTER — PATIENT MESSAGE (OUTPATIENT)
Dept: HEMATOLOGY/ONCOLOGY | Facility: CLINIC | Age: 48
End: 2023-05-04
Payer: MEDICARE

## 2023-05-05 ENCOUNTER — PATIENT MESSAGE (OUTPATIENT)
Dept: HEMATOLOGY/ONCOLOGY | Facility: CLINIC | Age: 48
End: 2023-05-05
Payer: MEDICARE

## 2023-05-06 NOTE — TELEPHONE ENCOUNTER
Spoke with pt regarding surgery, pt advised to arrive to [facility] at 0530 for 0700 surgery, pt verbalized understanding, pre-op education reinforced, all questions answered at this time, pt given reassurance    Dr. Edwar Sweeney Dr. Sweeney

## 2023-05-08 ENCOUNTER — PATIENT MESSAGE (OUTPATIENT)
Dept: REHABILITATION | Facility: HOSPITAL | Age: 48
End: 2023-05-08
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
## 2023-05-08 PROBLEM — Z79.4 TYPE 2 DIABETES MELLITUS WITHOUT COMPLICATION, WITH LONG-TERM CURRENT USE OF INSULIN: Status: ACTIVE | Noted: 2020-01-07

## 2023-05-09 ENCOUNTER — PATIENT MESSAGE (OUTPATIENT)
Dept: HEMATOLOGY/ONCOLOGY | Facility: CLINIC | Age: 48
End: 2023-05-09
Payer: MEDICARE

## 2023-05-09 DIAGNOSIS — G89.3 NEOPLASM RELATED PAIN: ICD-10-CM

## 2023-05-09 RX ORDER — HYDROCODONE BITARTRATE AND ACETAMINOPHEN 10; 325 MG/1; MG/1
1 TABLET ORAL EVERY 6 HOURS PRN
Qty: 90 TABLET | Refills: 0 | OUTPATIENT
Start: 2023-05-09

## 2023-05-10 ENCOUNTER — TELEPHONE (OUTPATIENT)
Dept: PALLIATIVE MEDICINE | Facility: CLINIC | Age: 48
End: 2023-05-10
Payer: MEDICARE

## 2023-05-11 ENCOUNTER — OFFICE VISIT (OUTPATIENT)
Dept: PALLIATIVE MEDICINE | Facility: CLINIC | Age: 48
End: 2023-05-11
Payer: MEDICARE

## 2023-05-11 VITALS
HEART RATE: 69 BPM | OXYGEN SATURATION: 99 % | TEMPERATURE: 98 F | SYSTOLIC BLOOD PRESSURE: 140 MMHG | BODY MASS INDEX: 41.75 KG/M2 | DIASTOLIC BLOOD PRESSURE: 70 MMHG | HEIGHT: 73 IN | WEIGHT: 315 LBS

## 2023-05-11 DIAGNOSIS — G47.00 INSOMNIA, UNSPECIFIED TYPE: ICD-10-CM

## 2023-05-11 DIAGNOSIS — R53.0 NEOPLASTIC (MALIGNANT) RELATED FATIGUE: ICD-10-CM

## 2023-05-11 DIAGNOSIS — Z71.89 ADVANCED CARE PLANNING/COUNSELING DISCUSSION: ICD-10-CM

## 2023-05-11 DIAGNOSIS — Z17.1 MALIGNANT NEOPLASM INVOLVING BOTH NIPPLE AND AREOLA OF RIGHT BREAST IN MALE, ESTROGEN RECEPTOR NEGATIVE: Primary | ICD-10-CM

## 2023-05-11 DIAGNOSIS — T45.1X5A CHEMOTHERAPY-INDUCED NEUROPATHY: ICD-10-CM

## 2023-05-11 DIAGNOSIS — C50.021 MALIGNANT NEOPLASM INVOLVING BOTH NIPPLE AND AREOLA OF RIGHT BREAST IN MALE, ESTROGEN RECEPTOR NEGATIVE: Primary | ICD-10-CM

## 2023-05-11 DIAGNOSIS — Z51.5 ENCOUNTER FOR PALLIATIVE CARE: ICD-10-CM

## 2023-05-11 DIAGNOSIS — G89.3 CANCER RELATED PAIN: ICD-10-CM

## 2023-05-11 DIAGNOSIS — G62.0 CHEMOTHERAPY-INDUCED NEUROPATHY: ICD-10-CM

## 2023-05-11 DIAGNOSIS — G89.3 NEOPLASM RELATED PAIN: ICD-10-CM

## 2023-05-11 DIAGNOSIS — C80.1 ANXIETY ASSOCIATED WITH CANCER DIAGNOSIS: ICD-10-CM

## 2023-05-11 DIAGNOSIS — F41.1 ANXIETY ASSOCIATED WITH CANCER DIAGNOSIS: ICD-10-CM

## 2023-05-11 DIAGNOSIS — F43.23 ADJUSTMENT DISORDER WITH MIXED ANXIETY AND DEPRESSED MOOD: ICD-10-CM

## 2023-05-11 PROCEDURE — 4010F PR ACE/ARB THEARPY RXD/TAKEN: ICD-10-PCS | Mod: CPTII,S$GLB,, | Performed by: STUDENT IN AN ORGANIZED HEALTH CARE EDUCATION/TRAINING PROGRAM

## 2023-05-11 PROCEDURE — 99215 PR OFFICE/OUTPT VISIT, EST, LEVL V, 40-54 MIN: ICD-10-PCS | Mod: S$GLB,,, | Performed by: STUDENT IN AN ORGANIZED HEALTH CARE EDUCATION/TRAINING PROGRAM

## 2023-05-11 PROCEDURE — 1159F MED LIST DOCD IN RCRD: CPT | Mod: CPTII,S$GLB,, | Performed by: STUDENT IN AN ORGANIZED HEALTH CARE EDUCATION/TRAINING PROGRAM

## 2023-05-11 PROCEDURE — 3008F PR BODY MASS INDEX (BMI) DOCUMENTED: ICD-10-PCS | Mod: CPTII,S$GLB,, | Performed by: STUDENT IN AN ORGANIZED HEALTH CARE EDUCATION/TRAINING PROGRAM

## 2023-05-11 PROCEDURE — 4010F ACE/ARB THERAPY RXD/TAKEN: CPT | Mod: CPTII,S$GLB,, | Performed by: STUDENT IN AN ORGANIZED HEALTH CARE EDUCATION/TRAINING PROGRAM

## 2023-05-11 PROCEDURE — 99999 PR PBB SHADOW E&M-EST. PATIENT-LVL III: ICD-10-PCS | Mod: PBBFAC,,, | Performed by: STUDENT IN AN ORGANIZED HEALTH CARE EDUCATION/TRAINING PROGRAM

## 2023-05-11 PROCEDURE — 3078F PR MOST RECENT DIASTOLIC BLOOD PRESSURE < 80 MM HG: ICD-10-PCS | Mod: CPTII,S$GLB,, | Performed by: STUDENT IN AN ORGANIZED HEALTH CARE EDUCATION/TRAINING PROGRAM

## 2023-05-11 PROCEDURE — 1160F PR REVIEW ALL MEDS BY PRESCRIBER/CLIN PHARMACIST DOCUMENTED: ICD-10-PCS | Mod: CPTII,S$GLB,, | Performed by: STUDENT IN AN ORGANIZED HEALTH CARE EDUCATION/TRAINING PROGRAM

## 2023-05-11 PROCEDURE — 3077F PR MOST RECENT SYSTOLIC BLOOD PRESSURE >= 140 MM HG: ICD-10-PCS | Mod: CPTII,S$GLB,, | Performed by: STUDENT IN AN ORGANIZED HEALTH CARE EDUCATION/TRAINING PROGRAM

## 2023-05-11 PROCEDURE — 1159F PR MEDICATION LIST DOCUMENTED IN MEDICAL RECORD: ICD-10-PCS | Mod: CPTII,S$GLB,, | Performed by: STUDENT IN AN ORGANIZED HEALTH CARE EDUCATION/TRAINING PROGRAM

## 2023-05-11 PROCEDURE — 3078F DIAST BP <80 MM HG: CPT | Mod: CPTII,S$GLB,, | Performed by: STUDENT IN AN ORGANIZED HEALTH CARE EDUCATION/TRAINING PROGRAM

## 2023-05-11 PROCEDURE — 3077F SYST BP >= 140 MM HG: CPT | Mod: CPTII,S$GLB,, | Performed by: STUDENT IN AN ORGANIZED HEALTH CARE EDUCATION/TRAINING PROGRAM

## 2023-05-11 PROCEDURE — 99999 PR PBB SHADOW E&M-EST. PATIENT-LVL III: CPT | Mod: PBBFAC,,, | Performed by: STUDENT IN AN ORGANIZED HEALTH CARE EDUCATION/TRAINING PROGRAM

## 2023-05-11 PROCEDURE — 1160F RVW MEDS BY RX/DR IN RCRD: CPT | Mod: CPTII,S$GLB,, | Performed by: STUDENT IN AN ORGANIZED HEALTH CARE EDUCATION/TRAINING PROGRAM

## 2023-05-11 PROCEDURE — 3008F BODY MASS INDEX DOCD: CPT | Mod: CPTII,S$GLB,, | Performed by: STUDENT IN AN ORGANIZED HEALTH CARE EDUCATION/TRAINING PROGRAM

## 2023-05-11 PROCEDURE — 99215 OFFICE O/P EST HI 40 MIN: CPT | Mod: S$GLB,,, | Performed by: STUDENT IN AN ORGANIZED HEALTH CARE EDUCATION/TRAINING PROGRAM

## 2023-05-11 RX ORDER — GABAPENTIN 300 MG/1
900 CAPSULE ORAL 3 TIMES DAILY
Qty: 270 CAPSULE | Refills: 2 | Status: SHIPPED | OUTPATIENT
Start: 2023-05-11 | End: 2023-09-21 | Stop reason: SDUPTHER

## 2023-05-11 RX ORDER — TRAZODONE HYDROCHLORIDE 100 MG/1
100 TABLET ORAL NIGHTLY
Qty: 30 TABLET | Refills: 2 | Status: SHIPPED | OUTPATIENT
Start: 2023-05-11 | End: 2023-11-02 | Stop reason: SDUPTHER

## 2023-05-11 RX ORDER — ALPRAZOLAM 0.5 MG/1
0.5 TABLET ORAL 3 TIMES DAILY PRN
Qty: 60 TABLET | Refills: 0 | Status: SHIPPED | OUTPATIENT
Start: 2023-05-11 | End: 2023-08-03 | Stop reason: SDUPTHER

## 2023-05-11 RX ORDER — HYDROCODONE BITARTRATE AND ACETAMINOPHEN 10; 325 MG/1; MG/1
1 TABLET ORAL EVERY 6 HOURS PRN
Qty: 90 TABLET | Refills: 0 | Status: SHIPPED | OUTPATIENT
Start: 2023-05-11 | End: 2023-05-12 | Stop reason: SDUPTHER

## 2023-05-11 RX ORDER — FLUOXETINE HYDROCHLORIDE 40 MG/1
80 CAPSULE ORAL DAILY
Qty: 60 CAPSULE | Refills: 2 | Status: SHIPPED | OUTPATIENT
Start: 2023-05-11 | End: 2023-09-22
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
## 2023-05-12 ENCOUNTER — CLINICAL SUPPORT (OUTPATIENT)
Dept: REHABILITATION | Facility: HOSPITAL | Age: 48
End: 2023-05-12
Payer: MEDICARE

## 2023-05-12 ENCOUNTER — PATIENT MESSAGE (OUTPATIENT)
Dept: HEMATOLOGY/ONCOLOGY | Facility: CLINIC | Age: 48
End: 2023-05-12
Payer: MEDICARE

## 2023-05-12 DIAGNOSIS — I89.0 LYMPHEDEMA OF RIGHT ARM: Primary | ICD-10-CM

## 2023-05-12 DIAGNOSIS — G89.3 NEOPLASM RELATED PAIN: ICD-10-CM

## 2023-05-12 DIAGNOSIS — Z74.09 IMPAIRED FUNCTIONAL MOBILITY AND ACTIVITY TOLERANCE: ICD-10-CM

## 2023-05-12 PROCEDURE — 97140 MANUAL THERAPY 1/> REGIONS: CPT | Mod: KX

## 2023-05-12 RX ORDER — HYDROCODONE BITARTRATE AND ACETAMINOPHEN 10; 325 MG/1; MG/1
1 TABLET ORAL EVERY 6 HOURS PRN
Qty: 90 TABLET | Refills: 0 | Status: SHIPPED | OUTPATIENT
Start: 2023-05-12 | End: 2023-06-04 | Stop reason: SDUPTHER

## 2023-05-12 NOTE — PLAN OF CARE
"  Outpatient Therapy Updated Plan of Care     Visit Date: 5/12/2023  Name: Paul Li Jr.  Clinic Number: [id number]    Therapy Diagnosis:   Encounter Diagnoses   Name Primary?    Lymphedema of right arm Yes    Impaired functional mobility and activity tolerance      Physician: Rosa Linn MD    Physician Orders: PT Eval and Treat -RUE lymphedema  Medical Diagnosis: Malignant neoplasm involving both nipple and areola of right breast in male, estrogen receptor negative [C50.021, Z17.1], Lymphedema of right upper extremity   Evaluation Date: 10/10/2022    Total Visits Received: 30  Cancelled Visits: unknown  No Show Visits: unknown    Current Certification Period: 4/7/23 to 5/5/23  Precautions:  Standard, Cancer, Spine, bony mets, history of seizures, RUE lymphedema  Visits from Evaluation Date:  29      Subjective     Update:   Pt reports: His compression pump has been working well, but it makes the bandages come down.   He was compliant with self manual lymph drainage  Response to previous treatment: no adverse reaction  Pain Scale: Paul rates pain on a scale of 0/10 on VAS.   Pain location: low back to tailbone     Fatigue: none reported  Functional change: none stated    Objective     Update:       LANDMARK RIGHT UE LEFT UE DIFF RUE Diff RUE Diff RUE Diff RUE Diff RUE Diff RUE Diff RUE Diff RUE Diff RUE Diff RUE Diff RUE Diff RUE Diff RUE Diff RUE Diff RUE Diff RUE Diff RUE Diff RUE Diff RUE Diff   Date Eval Eval Eval 10/17/22 10/17/22 10/29/22 10/19/22 11/2/22 11/2/22 11/11/22 11/11/22 11/29/22 11/29/22 12/5/22 12/5/22 12/13/22 12/13/22 12/21/22 12/21/22 12/30/22 12/30/22 1/6/23 1/6/23 1/26/23 1/26/23 2/6/23 2/6/23 3/6/23 3/6/23 3/23/23 3/23/23 3/30/23 3/30/23 4/3/23 4/3/23 4/20/23 4/20/23 4/27/23 4/27/23 5/12/23 5/12/23   E + 8" 50 cm 46.5 cm 3.5 cm 48 cm -2 47.5 cm -0.5 50.5 cm +3 47.5 cm -3 49.5 cm +2 50.5 cm +1 48.5 cm -2 49 cm +0.5 48 cm -1 50 cm +2 48.5 cm -1.5 49 cm +0.5 49 cm No chg 47 cm -2 49 cm +2 " "48.5 cm -0.5 49.5 cm +1 50 cm +0.5 49 cm -1   E + 6" 45.5 cm 44 cm 1.5 cm 45 cm -0.5 46.5 cm +1.5 47 cm +0.5 45 cm -2 45.5 cm +0.5 46 cm +0.5 45 cm -1 45.5 cm +0.5 45.5 cm No chg 47 cm +1.5 45 cm -2 45.5 cm +0.5 46.5 cm +1 44 cm -2.5 46.5 cm +2.5 44.5 cm -2 44.5 cm No chg 46.5 cm +2 47.5 cm +1   E + 4" 40 cm 40.5 cm 0.5 cm 40.5 cm +0.5 40.5cm No chg 41 cm +0.5 40.5 cm -0.5 40.5 cm No chg 40 cm -0.5 41.5 cm +1.5  41 cm -0.5 41 cm No chg 42.5 cm +1.5 40 cm -2.5 41.5 cm +1.5 41.5 cm No chg 41 cm -0.5 41.5 cm +0.5 41.5 cm No chg 40.5 cm -1 43 cm +2.5 43 cm No chg   E + 2" 38.5 cm 38.5 cm 0 cm 39 cm +0.5 40 cm +1 39.5 cm -0.5 38.5 cm -1 38 cm -0.5 39 cm +1 39 cm No chg 39 cm No chg 39 cm No chg 39.5 cm +0.5 38.5 cm -1 37.5 cm -1 38 cm +0.5 38 cm No chg 39.5 cm +1.5 37.5 cm -2 37 cm -0.5 40.5 cm +3.5 40 cm -0.5   Elbow 36.5 cm 34 cm 2.5 cm 35.5 cm -1 35.5 cm No chg 36 cm +0.5 34.5 cm -1.5 36 cm +1.5 36 cm No chg 35 cm -1 36 cm +1 35.5 cm -0.5 37.5 cm +2 35 cm -2.5 35 cm No chg 34 cm -1 34.5 cm +0.5 35 cm +0.5 36 cm +1 34.5 cm -1.5 36 cm +1.5 34.5 cm -1.5   W+ 8" 37.5cm 33 cm 4.5 cm 38cm +0.5 36.5 cm -1.5 37.5 cm +1 36.5 cm -1 37.5 cm +1 37.5 cm No chg 37 cm -0.5 37 cm No chg 37 cm No chg 38 cm +1 37 cm -1 37.5 cm +0.5 36.5 cm -1 36.5 cm No chg 37 cm +0.5 37 cm No chg 35 cm -2 37.5 cm +2.5 36.5 cm -1   W +  6" 35 cm 29.5 cm 5.5 cm 34 cm -1 34.5 cm +0.5 33.5 cm -1 32.5 cm -1 33 cm +0.5 33 cm No chg 33cm No chg 34.5 cm +1.5 34.5 cm No chg 36 cm +1.5 33.5 cm -2.5 34.5 cm +1 34 cm -0.5 33 cm -1 33 cm No chg 34 cm +1 31 cm -3 34 cm +3 34 cm No chg   W + 4" 31.5 cm 25 cm 6.5 cm 31.5 cm No chg 31.5 cm No chg 29.5 cm -2 29 cm -0.5 30 cm +1 28 cm -2 29 cm +1 29 cm No chg 29.5 cm +0.5 32 cm +2.5 30 cm -2 30 cm No chg 29.5 cm -0.5 29 cm +0.5 28 cm -1 29.5 cm +1.5 26.5 cm -3 29 cm +2.5 29.5 cm +0.5   Wrist 23 cm 20 cm 3 cm 22.5cm -0.5 21.5 cm -1 23 cm +1.5 21 cm -2 24 cm +3 22 cm -2 22 cm No chg 22.5 cm +0.5 23 cm +0.5 23 cm No chg 22 " cm -1 21 cm -1 21.5 cm +0.5 22.5 cm +1 21.5 cm -1 21cm -0.5 21 cm No chg 20 cm -1 21 cm +1   DPC 28.5 cm 26 cm 2.5 cm 28 cm -0.5 27.5cm -0.5 28.5 cm +1 27 cm -1.5 28 cm +1 27 cm -1 26.5 cm -0.5 28 cm +1.5 27.5 cm -0.5 27 cm -0.5 28 cm +1 25.5 cm -2.5 26 cm +0.5 25.5 cm -0.5 26 cm +0.5 26 cm No chg 24.5 cm -1.5 25 cm +0.5 25.5 cm +0.5   IP Thumb 8.5 cm 8 cm 0.5 cm 8.5 cm No chg 8.5 cm No chg 9 cm +0.5 8.5 cm -0.5 8.5 cm No chg 9 cm +0.5 8.5 cm -0.5 8.5 cm No chg 8.5 cm No chg 9 cm +0.5 9 cm No chg 8.5 cm -0.5 9 cm +0.5 9 cm No chg 8.5 cm -0.5 8 cm -0.5 8.5 cm +0.5 8.5 cm No chg 8.5 cm No chg               Assessment     Update:   Patient tolerated session well.  He demo's reduced measurements today. He has been compliant with compression pump, manual lymph drainage, and bandaging. Once his measurements stabilize, he will benefit from a compression sleeve and glove.  He has met goals as noted below. Will progress as tolerated working towards remaining goals with emphasis on getting compression sleeve and glove.     Previous Short Term Goals Status:   met partially  New Short Term Goals Status:   N/A  Long Term Goal Status:   continue per initial plan of care.  Reasons for Recertification of Therapy:  pt has met majority of goals, but his arm circumference continues to fluctuate.  Once he is appropriate to be measured/fit for compression garments and receives appropriate garment, he will be appropriate for discharge.    Plan     Updated Certification Period: 5/12/2023 to 6/9/23  Recommended Treatment Plan: 2 times per week for 4 weeks: Manual Therapy, Neuromuscular Re-ed, Orthotic Management and Training, Patient Education, Self Care, Therapeutic Activities, and Therapeutic Exercise  Other Recommendations: none anticipated    Katelynn Harrell, PT  5/12/2023      I CERTIFY THE NEED FOR THESE SERVICES FURNISHED UNDER THIS PLAN OF TREATMENT AND WHILE UNDER MY CARE    Physician's comments:        Physician's Signature:  ___________________________________________________

## 2023-05-12 NOTE — PROGRESS NOTES
Physical Therapy Daily Treatment Note       Date: 5/12/2023   Name: Paul Li Jr.  Clinic Number: [id number]    Therapy Diagnosis:   Encounter Diagnoses   Name Primary?    Lymphedema of right arm Yes    Impaired functional mobility and activity tolerance      Physician: Rosa Linn MD    Physician Orders: PT Eval and Treat -RUE lymphedema  Medical Diagnosis: Malignant neoplasm involving both nipple and areola of right breast in male, estrogen receptor negative [C50.021, Z17.1], Lymphedema of right upper extremity   Evaluation Date: 10/10/2022  Authorization Period Expiration: 3/30/23  Updated Plan of Care Certification Period: 6/9/23  Visit # / Visits authorized: 30(16/24 newly authorized)   Insurance: [employer]  FOTO: 2/3     Time In: 8:13 AM  (late arrival)  Time Out: 9:03 AM  Total Billable Time: 50 minutes     Precautions: Standard, Fall, cancer, and Spine, Bony Mets, hx of Seizures, L chest wall port      Subjective     Pt reports: No new complaints. His arm feels good. Had some back pain yesterday. He has been using the pump and doing self manual lymph drainage. He has been bandaging himself as well.   He was compliant with self manual lymph drainage  Response to previous treatment: no adverse reaction  Pain Scale: Paul rates pain on a scale of 0/10 on VAS.   Pain location: low back to tailbone    Fatigue: none reported  Functional change: none stated  Treatment:   Chemotherapy: te-adjuvant chemo therapy completed 10/19  Pt is currently on chemotherapy: pt is getting 1 infusion/week for 3 weeks with 1 week break, and he takes oral chemotherapy daily.  Radiation: Completed in February 2020  Surgery date: 12/17/19 pt underwent right axillary lymph node dissection and resection excess lateral tissue; 11/22/19 right simple mastectomy with SLNB; total of 18 lymph nodes removed      Objective     Objective Measures updated at progress  "report unless specified.     LANDMARK RIGHT UE LEFT UE DIFF RUE Diff RUE Diff RUE Diff RUE Diff RUE Diff RUE Diff RUE Diff RUE Diff RUE Diff RUE Diff RUE Diff RUE Diff RUE Diff RUE Diff RUE Diff RUE Diff RUE Diff RUE Diff RUE Diff   Date Eval Eval Eval 10/17/22 10/17/22 10/29/22 10/19/22 11/2/22 11/2/22 11/11/22 11/11/22 11/29/22 11/29/22 12/5/22 12/5/22 12/13/22 12/13/22 12/21/22 12/21/22 12/30/22 12/30/22 1/6/23 1/6/23 1/26/23 1/26/23 2/6/23 2/6/23 3/6/23 3/6/23 3/23/23 3/23/23 3/30/23 3/30/23 4/3/23 4/3/23 4/20/23 4/20/23 4/27/23 4/27/23 5/12/23 5/12/23   E + 8" 50 cm 46.5 cm 3.5 cm 48 cm -2 47.5 cm -0.5 50.5 cm +3 47.5 cm -3 49.5 cm +2 50.5 cm +1 48.5 cm -2 49 cm +0.5 48 cm -1 50 cm +2 48.5 cm -1.5 49 cm +0.5 49 cm No chg 47 cm -2 49 cm +2 48.5 cm -0.5 49.5 cm +1 50 cm +0.5 49 cm -1   E + 6" 45.5 cm 44 cm 1.5 cm 45 cm -0.5 46.5 cm +1.5 47 cm +0.5 45 cm -2 45.5 cm +0.5 46 cm +0.5 45 cm -1 45.5 cm +0.5 45.5 cm No chg 47 cm +1.5 45 cm -2 45.5 cm +0.5 46.5 cm +1 44 cm -2.5 46.5 cm +2.5 44.5 cm -2 44.5 cm No chg 46.5 cm +2 47.5 cm +1   E + 4" 40 cm 40.5 cm 0.5 cm 40.5 cm +0.5 40.5cm No chg 41 cm +0.5 40.5 cm -0.5 40.5 cm No chg 40 cm -0.5 41.5 cm +1.5  41 cm -0.5 41 cm No chg 42.5 cm +1.5 40 cm -2.5 41.5 cm +1.5 41.5 cm No chg 41 cm -0.5 41.5 cm +0.5 41.5 cm No chg 40.5 cm -1 43 cm +2.5 43 cm No chg   E + 2" 38.5 cm 38.5 cm 0 cm 39 cm +0.5 40 cm +1 39.5 cm -0.5 38.5 cm -1 38 cm -0.5 39 cm +1 39 cm No chg 39 cm No chg 39 cm No chg 39.5 cm +0.5 38.5 cm -1 37.5 cm -1 38 cm +0.5 38 cm No chg 39.5 cm +1.5 37.5 cm -2 37 cm -0.5 40.5 cm +3.5 40 cm -0.5   Elbow 36.5 cm 34 cm 2.5 cm 35.5 cm -1 35.5 cm No chg 36 cm +0.5 34.5 cm -1.5 36 cm +1.5 36 cm No chg 35 cm -1 36 cm +1 35.5 cm -0.5 37.5 cm +2 35 cm -2.5 35 cm No chg 34 cm -1 34.5 cm +0.5 35 cm +0.5 36 cm +1 34.5 cm -1.5 36 cm +1.5 34.5 cm -1.5   W+ 8" 37.5cm 33 cm 4.5 cm 38cm +0.5 36.5 cm -1.5 37.5 cm +1 36.5 cm -1 37.5 cm +1 37.5 cm No chg 37 cm -0.5 37 cm No chg 37 cm " "No chg 38 cm +1 37 cm -1 37.5 cm +0.5 36.5 cm -1 36.5 cm No chg 37 cm +0.5 37 cm No chg 35 cm -2 37.5 cm +2.5 36.5 cm -1   W +  6" 35 cm 29.5 cm 5.5 cm 34 cm -1 34.5 cm +0.5 33.5 cm -1 32.5 cm -1 33 cm +0.5 33 cm No chg 33cm No chg 34.5 cm +1.5 34.5 cm No chg 36 cm +1.5 33.5 cm -2.5 34.5 cm +1 34 cm -0.5 33 cm -1 33 cm No chg 34 cm +1 31 cm -3 34 cm +3 34 cm No chg   W + 4" 31.5 cm 25 cm 6.5 cm 31.5 cm No chg 31.5 cm No chg 29.5 cm -2 29 cm -0.5 30 cm +1 28 cm -2 29 cm +1 29 cm No chg 29.5 cm +0.5 32 cm +2.5 30 cm -2 30 cm No chg 29.5 cm -0.5 29 cm +0.5 28 cm -1 29.5 cm +1.5 26.5 cm -3 29 cm +2.5 29.5 cm +0.5   Wrist 23 cm 20 cm 3 cm 22.5cm -0.5 21.5 cm -1 23 cm +1.5 21 cm -2 24 cm +3 22 cm -2 22 cm No chg 22.5 cm +0.5 23 cm +0.5 23 cm No chg 22 cm -1 21 cm -1 21.5 cm +0.5 22.5 cm +1 21.5 cm -1 21cm -0.5 21 cm No chg 20 cm -1 21 cm +1   DPC 28.5 cm 26 cm 2.5 cm 28 cm -0.5 27.5cm -0.5 28.5 cm +1 27 cm -1.5 28 cm +1 27 cm -1 26.5 cm -0.5 28 cm +1.5 27.5 cm -0.5 27 cm -0.5 28 cm +1 25.5 cm -2.5 26 cm +0.5 25.5 cm -0.5 26 cm +0.5 26 cm No chg 24.5 cm -1.5 25 cm +0.5 25.5 cm +0.5   IP Thumb 8.5 cm 8 cm 0.5 cm 8.5 cm No chg 8.5 cm No chg 9 cm +0.5 8.5 cm -0.5 8.5 cm No chg 9 cm +0.5 8.5 cm -0.5 8.5 cm No chg 8.5 cm No chg 9 cm +0.5 9 cm No chg 8.5 cm -0.5 9 cm +0.5 9 cm No chg 8.5 cm -0.5 8 cm -0.5 8.5 cm +0.5 8.5 cm No chg 8.5 cm No chg          Treatment         Paul received the following manual therapy techniques were performed to increased myofascial/soft tissue length, mobility and pliability, increase PROM, AROM and function as well as to decrease pain for  50 minutes    Measurements taken above    Diaphragmatic breathing x 5 reps    Short Neck- held due to history of seizures  Clear Healthy Lymph Nodes:  Left Axilla                                                  Right Groin  Open Anastomoses:  Anterior Axillo-Axillary  (AAA)                                    Axillo-Inguinal     (AI)                              "       Posterior Axillo-Axillary  (GEREMIAS)     Right Upper Arm (Lateral and Medial)  Right  Antecubital Fossa  Right Dorsal Forearm  Right Volar Forearm  Dorsum Right Hand -not performed today       Rework Right UE  Rework Anastomoses  Rework Healthy lymph Nodes      PT applies gauze to right fingers and hand, stockinette, cotton artiflex, and short stretch bandages to pt's RIGHT/LEFT/BILATERAL: right upper extremity.  Pt educated to wear bandaging until next session unless it causes pain, numbness, or changes in skin color/temperature, or if they start to fall down.  If removed, pt instructed to roll up bandages and cotton padding and bring to next session. If bandages are removed, pt can wear his tubigrip.  Pt has Vet glove to cover bandages in the shower, and we reviewed directions on how to care for bandages. Pt verbalizes understanding of all topics.      Home Exercise Program and Patient Education   Education provided re:  - role of PT in multi - disciplinary team, goals for PT  - progress towards goals         Written Home Exercises Provided: Patient instructed to cont prior HEP.  Exercises were reviewed and Paul was able to demonstrate them prior to the end of the session.  Paul demonstrated good  understanding of the education provided.     See EMR under Media for self lymphatic drainage provided prior visit.    Pt has no cultural, educational or language barriers to learning provided.    Assessment     Patient tolerated session well.  He demo's reduced measurements today. He has been compliant with compression pump, manual lymph drainage, and bandaging. Once his measurements stabilize, he will benefit from a compression sleeve and glove.  He has met goals as noted below. Will progress as tolerated working towards remaining goals with emphasis on getting compression sleeve and glove.     Pt prognosis is Good. Pt will continue to benefit from skilled outpatient physical therapy to address the deficits listed in  the problem list chart on initial evaluation, provide pt/family education and to maximize pt's level of independence in the home and community environment.     Anticipated barriers to physical therapy: advanced disease    Goals as follows:  Short Term Goals (6 weeks)  1. Pt will demo decrease in girth in right upper extremity by >/= 2cm to allow for improved UE symmetry, clothing choice, ROM, and UE function. (Met, 1/26/23  2. Patient will demonstrate 100% knowledge of lymphedema precautions and signs of infection to allow for reduced risk of lymphedema, reduced risk of infection, and/or exacerbation.  (met)  3. Patient will perform self lymph drainage techniques to enhance lymphatic drainage and mobility.  ( met)  4. Patient will tolerate daily activities with multilayered bandaging to enhance lymphatic drainage and skin elasticity.  (met)  5. Pt will tolerate HEP for improved strength, functional mobility, ROM, posture, and endurance. (met)        Long Term Goals: ( 12  weeks)  1. Patient to show decreased girth in right upper extremity by >/= 3cm to allow for improved UE symmetry, clothing choice, ROM, and UE function.  (progressing, not met)  2. Patient will show reduction in density to mild or less with improved contour of limb to allow for improved cosmesis, improved lymphatic drainage, and functional mobility.  (met, 5/12/23)  3. Patient to bruna/doff compression garment with daily compliance to support lymphatic mobility and skin elasticity.  (progressing, not met)  4. Pt to show improved postural awareness and alignment for improved functional mobility.  (progressing, not met)  5. Pt to be independent and compliant with HEP to allow for improved ROM, reach and carry with functional endurance and strength.    (met)           Plan   Outpatient physical therapy 2x week for 4 weeks to include the following:   Manual Therapy, Neuromuscular Re-ed, Orthotic Management and Training, Patient Education, Self Care,  Therapeutic Activities, and Therapeutic Exercise.     Plan of care Certification Period: 5/12/23 to 6/9/23       Therapist: Katelynn Harrell, PT  5/12/2023

## 2023-05-15 ENCOUNTER — TELEPHONE (OUTPATIENT)
Dept: INFUSION THERAPY | Facility: HOSPITAL | Age: 48
End: 2023-05-15
Payer: MEDICARE

## 2023-05-15 ENCOUNTER — CLINICAL SUPPORT (OUTPATIENT)
Dept: REHABILITATION | Facility: HOSPITAL | Age: 48
End: 2023-05-15
Payer: MEDICARE

## 2023-05-15 ENCOUNTER — TELEPHONE (OUTPATIENT)
Dept: FAMILY MEDICINE | Facility: CLINIC | Age: 48
End: 2023-05-15
Payer: MEDICARE

## 2023-05-15 DIAGNOSIS — I89.0 LYMPHEDEMA OF RIGHT ARM: Primary | ICD-10-CM

## 2023-05-15 DIAGNOSIS — Z74.09 IMPAIRED FUNCTIONAL MOBILITY AND ACTIVITY TOLERANCE: ICD-10-CM

## 2023-05-15 PROCEDURE — 97530 THERAPEUTIC ACTIVITIES: CPT | Mod: KX

## 2023-05-15 PROCEDURE — 97140 MANUAL THERAPY 1/> REGIONS: CPT | Mod: KX

## 2023-05-15 NOTE — PROGRESS NOTES
Physical Therapy Daily Treatment Note       Date: 5/15/2023   Name: Paul Li Jr.  Clinic Number: [id number]    Therapy Diagnosis:   Encounter Diagnoses   Name Primary?    Lymphedema of right arm Yes    Impaired functional mobility and activity tolerance      Physician: Rosa Linn MD    Physician Orders: PT Eval and Treat -RUE lymphedema  Medical Diagnosis: Malignant neoplasm involving both nipple and areola of right breast in male, estrogen receptor negative [C50.021, Z17.1], Lymphedema of right upper extremity   Evaluation Date: 10/10/2022  Authorization Period Expiration: 3/30/23  Updated Plan of Care Certification Period: 6/9/23  Visit # / Visits authorized: 31(17/24 newly authorized)   Insurance: [employer]  FOTO: 2/3     Time In: 8:05 AM    Time Out: 9:05 AM  Total Billable Time: 60 minutes     Precautions: Standard, Fall, cancer, and Spine, Bony Mets, hx of Seizures, L chest wall port      Subjective     Pt reports: He's feeling tired and slow today. He's been having more back pain. He endorses left groin pain. He called Dr. Linn about it, and she wants to see him about it before he starts chemo. PT messaged Dr. Linn via Epic Chat, and she clears pt to participate in PT with new pain. Pt states he may get a cane.     He was compliant with self manual lymph drainage  Response to previous treatment: no adverse reaction  Pain Scale: Paul rates pain on a scale of 6/10 on VAS.   Pain location: left groin    Fatigue: none reported  Functional change: none stated  Treatment:   Chemotherapy: te-adjuvant chemo therapy completed 10/19  Pt is currently on chemotherapy: pt is getting 1 infusion/week for 3 weeks with 1 week break, and he takes oral chemotherapy daily.  Radiation: Completed in February 2020  Surgery date: 12/17/19 pt underwent right axillary lymph node dissection and resection excess lateral tissue; 11/22/19 right simple  "mastectomy with SLNB; total of 18 lymph nodes removed      Objective     Objective Measures updated at progress report unless specified.     LANDMARK RIGHT UE LEFT UE DIFF RUE Diff RUE Diff RUE Diff RUE Diff RUE Diff RUE Diff RUE Diff RUE Diff RUE Diff RUE Diff RUE Diff RUE Diff RUE Diff RUE Diff RUE Diff RUE Diff RUE Diff RUE Diff RUE Diff   Date Eval Eval Eval 10/17/22 10/17/22 10/29/22 10/19/22 11/2/22 11/2/22 11/11/22 11/11/22 11/29/22 11/29/22 12/5/22 12/5/22 12/13/22 12/13/22 12/21/22 12/21/22 12/30/22 12/30/22 1/6/23 1/6/23 1/26/23 1/26/23 2/6/23 2/6/23 3/6/23 3/6/23 3/23/23 3/23/23 3/30/23 3/30/23 4/3/23 4/3/23 4/20/23 4/20/23 4/27/23 4/27/23 5/12/23 5/12/23   E + 8" 50 cm 46.5 cm 3.5 cm 48 cm -2 47.5 cm -0.5 50.5 cm +3 47.5 cm -3 49.5 cm +2 50.5 cm +1 48.5 cm -2 49 cm +0.5 48 cm -1 50 cm +2 48.5 cm -1.5 49 cm +0.5 49 cm No chg 47 cm -2 49 cm +2 48.5 cm -0.5 49.5 cm +1 50 cm +0.5 49 cm -1   E + 6" 45.5 cm 44 cm 1.5 cm 45 cm -0.5 46.5 cm +1.5 47 cm +0.5 45 cm -2 45.5 cm +0.5 46 cm +0.5 45 cm -1 45.5 cm +0.5 45.5 cm No chg 47 cm +1.5 45 cm -2 45.5 cm +0.5 46.5 cm +1 44 cm -2.5 46.5 cm +2.5 44.5 cm -2 44.5 cm No chg 46.5 cm +2 47.5 cm +1   E + 4" 40 cm 40.5 cm 0.5 cm 40.5 cm +0.5 40.5cm No chg 41 cm +0.5 40.5 cm -0.5 40.5 cm No chg 40 cm -0.5 41.5 cm +1.5  41 cm -0.5 41 cm No chg 42.5 cm +1.5 40 cm -2.5 41.5 cm +1.5 41.5 cm No chg 41 cm -0.5 41.5 cm +0.5 41.5 cm No chg 40.5 cm -1 43 cm +2.5 43 cm No chg   E + 2" 38.5 cm 38.5 cm 0 cm 39 cm +0.5 40 cm +1 39.5 cm -0.5 38.5 cm -1 38 cm -0.5 39 cm +1 39 cm No chg 39 cm No chg 39 cm No chg 39.5 cm +0.5 38.5 cm -1 37.5 cm -1 38 cm +0.5 38 cm No chg 39.5 cm +1.5 37.5 cm -2 37 cm -0.5 40.5 cm +3.5 40 cm -0.5   Elbow 36.5 cm 34 cm 2.5 cm 35.5 cm -1 35.5 cm No chg 36 cm +0.5 34.5 cm -1.5 36 cm +1.5 36 cm No chg 35 cm -1 36 cm +1 35.5 cm -0.5 37.5 cm +2 35 cm -2.5 35 cm No chg 34 cm -1 34.5 cm +0.5 35 cm +0.5 36 cm +1 34.5 cm -1.5 36 cm +1.5 34.5 cm -1.5   W+ 8" 37.5cm 33 " "cm 4.5 cm 38cm +0.5 36.5 cm -1.5 37.5 cm +1 36.5 cm -1 37.5 cm +1 37.5 cm No chg 37 cm -0.5 37 cm No chg 37 cm No chg 38 cm +1 37 cm -1 37.5 cm +0.5 36.5 cm -1 36.5 cm No chg 37 cm +0.5 37 cm No chg 35 cm -2 37.5 cm +2.5 36.5 cm -1   W +  6" 35 cm 29.5 cm 5.5 cm 34 cm -1 34.5 cm +0.5 33.5 cm -1 32.5 cm -1 33 cm +0.5 33 cm No chg 33cm No chg 34.5 cm +1.5 34.5 cm No chg 36 cm +1.5 33.5 cm -2.5 34.5 cm +1 34 cm -0.5 33 cm -1 33 cm No chg 34 cm +1 31 cm -3 34 cm +3 34 cm No chg   W + 4" 31.5 cm 25 cm 6.5 cm 31.5 cm No chg 31.5 cm No chg 29.5 cm -2 29 cm -0.5 30 cm +1 28 cm -2 29 cm +1 29 cm No chg 29.5 cm +0.5 32 cm +2.5 30 cm -2 30 cm No chg 29.5 cm -0.5 29 cm +0.5 28 cm -1 29.5 cm +1.5 26.5 cm -3 29 cm +2.5 29.5 cm +0.5   Wrist 23 cm 20 cm 3 cm 22.5cm -0.5 21.5 cm -1 23 cm +1.5 21 cm -2 24 cm +3 22 cm -2 22 cm No chg 22.5 cm +0.5 23 cm +0.5 23 cm No chg 22 cm -1 21 cm -1 21.5 cm +0.5 22.5 cm +1 21.5 cm -1 21cm -0.5 21 cm No chg 20 cm -1 21 cm +1   DPC 28.5 cm 26 cm 2.5 cm 28 cm -0.5 27.5cm -0.5 28.5 cm +1 27 cm -1.5 28 cm +1 27 cm -1 26.5 cm -0.5 28 cm +1.5 27.5 cm -0.5 27 cm -0.5 28 cm +1 25.5 cm -2.5 26 cm +0.5 25.5 cm -0.5 26 cm +0.5 26 cm No chg 24.5 cm -1.5 25 cm +0.5 25.5 cm +0.5   IP Thumb 8.5 cm 8 cm 0.5 cm 8.5 cm No chg 8.5 cm No chg 9 cm +0.5 8.5 cm -0.5 8.5 cm No chg 9 cm +0.5 8.5 cm -0.5 8.5 cm No chg 8.5 cm No chg 9 cm +0.5 9 cm No chg 8.5 cm -0.5 9 cm +0.5 9 cm No chg 8.5 cm -0.5 8 cm -0.5 8.5 cm +0.5 8.5 cm No chg 8.5 cm No chg          Treatment         Paul received the following manual therapy techniques were performed to increased myofascial/soft tissue length, mobility and pliability, increase PROM, AROM and function as well as to decrease pain for  45minutes      Short Neck- held due to history of seizures  Clear Healthy Lymph Nodes:  Left Axilla                                                  Right Groin  Open Anastomoses:  Anterior Axillo-Axillary  (AAA)                                    " Axillo-Inguinal     (AI)                                    Posterior Axillo-Axillary  (GEREMIAS) -not performed today      Right Upper Arm (Lateral and Medial)  Right  Antecubital Fossa         Rework Right UE  Rework Anastomoses  Rework Healthy lymph Nodes      PT applies gauze to right fingers and hand, stockinette, cotton artiflex, and short stretch bandages to pt's RIGHT/LEFT/BILATERAL: right upper extremity.  Pt educated to wear bandaging until next session unless it causes pain, numbness, or changes in skin color/temperature, or if they start to fall down.  If removed, pt instructed to roll up bandages and cotton padding and bring to next session. If bandages are removed, pt can wear his tubigrip.  Pt has Vet glove to cover bandages in the shower, and we reviewed directions on how to care for bandages. Pt verbalizes understanding of all topics.    Paul received therapeutic exercises to develop ROM, posture, and lymphatic motility for 5 minutes including:     Diaphragmatic breathing, 2 x 5 reps  Head turns x 5 reps B  Head tilts x 5 reps B    Pt participates in therapeutic activity 1:1 for 8 min:    Reviewed log rolling technique  Pt was taught how to use strap (ie dog leash) to assist his LLE in and out of bed more comfortably.  Therapist taught pt how to assess cane height, as well as gait mechanics with straight cane to decrease pressure on left hip. Pt demo's understanding, but may need review.    Home Exercise Program and Patient Education   Education provided re:  - role of PT in multi - disciplinary team, goals for PT  - progress towards goals         Written Home Exercises Provided: Patient instructed to cont prior HEP.  Exercises were reviewed and Paul was able to demonstrate them prior to the end of the session.  Paul demonstrated good  understanding of the education provided.     See EMR under Media for self lymphatic drainage provided prior visit.    Pt has no cultural, educational or language barriers  to learning provided.    Assessment     Patient tolerated session well. He reports new left groin pain, which referring oncologist is aware of and has cleared pt to continue with therapy. PT did educate pt on mobility modifications to reduce strain on left hip (both for bed mobility and walking). Pt demo's good understanding, but he may need review. . He has been compliant with compression pump, manual lymph drainage, and bandaging. Once his measurements stabilize, he will benefit from a compression sleeve and glove.  He has met goals as noted below. Will progress as tolerated working towards remaining goals with emphasis on getting compression sleeve and glove.     Pt prognosis is Good. Pt will continue to benefit from skilled outpatient physical therapy to address the deficits listed in the problem list chart on initial evaluation, provide pt/family education and to maximize pt's level of independence in the home and community environment.     Anticipated barriers to physical therapy: advanced disease    Goals as follows:  Short Term Goals (6 weeks)  1. Pt will demo decrease in girth in right upper extremity by >/= 2cm to allow for improved UE symmetry, clothing choice, ROM, and UE function. (Met, 1/26/23  2. Patient will demonstrate 100% knowledge of lymphedema precautions and signs of infection to allow for reduced risk of lymphedema, reduced risk of infection, and/or exacerbation.  (met)  3. Patient will perform self lymph drainage techniques to enhance lymphatic drainage and mobility.  ( met)  4. Patient will tolerate daily activities with multilayered bandaging to enhance lymphatic drainage and skin elasticity.  (met)  5. Pt will tolerate HEP for improved strength, functional mobility, ROM, posture, and endurance. (met)        Long Term Goals: ( 12  weeks)  1. Patient to show decreased girth in right upper extremity by >/= 3cm to allow for improved UE symmetry, clothing choice, ROM, and UE function.   (progressing, not met)  2. Patient will show reduction in density to mild or less with improved contour of limb to allow for improved cosmesis, improved lymphatic drainage, and functional mobility.  (met, 5/12/23)  3. Patient to bruna/doff compression garment with daily compliance to support lymphatic mobility and skin elasticity.  (progressing, not met)  4. Pt to show improved postural awareness and alignment for improved functional mobility.  (progressing, not met)  5. Pt to be independent and compliant with HEP to allow for improved ROM, reach and carry with functional endurance and strength.    (met)           Plan   Outpatient physical therapy 2x week for 4 weeks to include the following:   Manual Therapy, Neuromuscular Re-ed, Orthotic Management and Training, Patient Education, Self Care, Therapeutic Activities, and Therapeutic Exercise.     Plan of care Certification Period: 5/12/23 to 6/9/23       Therapist: Katelynn Harrell, PT  5/15/2023

## 2023-05-15 NOTE — TELEPHONE ENCOUNTER
Attempted to contact DME. No answer.   Patient has not been seen Dr. Arroyo since 2021. And I do not see any order for CPAP. Sleep study order was placed by Dr. Rodriguez in December.

## 2023-05-15 NOTE — TELEPHONE ENCOUNTER
----- Message from Michelle Nunez sent at 5/15/2023 10:01 AM CDT -----  Regarding: Needs Medical Advice  Contact: Nneka with [facility] at [phone]  Type: Needs Medical Advice  Who Called:  Nneka with [facility] at [phone] Ref# [id number]  Additional Information: Waiting for the prescription for Cpap for patient. Please call and advise. Thank you

## 2023-05-18 ENCOUNTER — PATIENT MESSAGE (OUTPATIENT)
Dept: REHABILITATION | Facility: HOSPITAL | Age: 48
End: 2023-05-18
Payer: MEDICARE

## 2023-05-18 ENCOUNTER — TELEPHONE (OUTPATIENT)
Dept: FAMILY MEDICINE | Facility: CLINIC | Age: 48
End: 2023-05-18
Payer: MEDICARE

## 2023-05-18 NOTE — TELEPHONE ENCOUNTER
----- Message from Katelynn Mims sent at 5/18/2023  1:38 PM CDT -----  Contact: Natasha  Type:  Needs Medical Advice    Who Called:  Natasha with [facility]     Would the patient rather a call back or a response via [facility]? Call     Best Call Back Number: [phone]    Additional Information:  Natasha with [facility] would like to speak with the nurse in regards to patient CIPAP supplies. The reference number is [id number].     Please call to advise

## 2023-05-18 NOTE — TELEPHONE ENCOUNTER
----- Message from Katelynn Mims sent at 5/18/2023  1:38 PM CDT -----  Contact: Natasha  Type:  Needs Medical Advice    Who Called:  Natasha with [facility]     Would the patient rather a call back or a response via [facility]? Call     Best Call Back Number: [phone]    Additional Information:  Natasha with [facility] would like to speak with the nurse in regards to patient CIPAP supplies. The reference number is [id number].     Please call to advise

## 2023-05-19 ENCOUNTER — INFUSION (OUTPATIENT)
Dept: INFUSION THERAPY | Facility: HOSPITAL | Age: 48
End: 2023-05-19
Attending: INTERNAL MEDICINE
Payer: MEDICARE

## 2023-05-19 ENCOUNTER — PATIENT MESSAGE (OUTPATIENT)
Dept: REHABILITATION | Facility: HOSPITAL | Age: 48
End: 2023-05-19
Payer: MEDICARE

## 2023-05-19 VITALS
HEART RATE: 70 BPM | BODY MASS INDEX: 41.75 KG/M2 | OXYGEN SATURATION: 99 % | RESPIRATION RATE: 18 BRPM | SYSTOLIC BLOOD PRESSURE: 144 MMHG | HEIGHT: 73 IN | TEMPERATURE: 98 F | WEIGHT: 315 LBS | DIASTOLIC BLOOD PRESSURE: 76 MMHG

## 2023-05-19 DIAGNOSIS — C50.021 MALIGNANT NEOPLASM INVOLVING BOTH NIPPLE AND AREOLA OF RIGHT BREAST IN MALE, ESTROGEN RECEPTOR NEGATIVE: Primary | ICD-10-CM

## 2023-05-19 DIAGNOSIS — Z17.1 MALIGNANT NEOPLASM INVOLVING BOTH NIPPLE AND AREOLA OF RIGHT BREAST IN MALE, ESTROGEN RECEPTOR NEGATIVE: Primary | ICD-10-CM

## 2023-05-19 PROCEDURE — 96415 CHEMO IV INFUSION ADDL HR: CPT

## 2023-05-19 PROCEDURE — 63600175 PHARM REV CODE 636 W HCPCS: Mod: JZ,TB | Performed by: INTERNAL MEDICINE

## 2023-05-19 PROCEDURE — 96375 TX/PRO/DX INJ NEW DRUG ADDON: CPT

## 2023-05-19 PROCEDURE — A4216 STERILE WATER/SALINE, 10 ML: HCPCS | Performed by: INTERNAL MEDICINE

## 2023-05-19 PROCEDURE — 96413 CHEMO IV INFUSION 1 HR: CPT

## 2023-05-19 PROCEDURE — 96367 TX/PROPH/DG ADDL SEQ IV INF: CPT

## 2023-05-19 PROCEDURE — 25000003 PHARM REV CODE 250: Performed by: INTERNAL MEDICINE

## 2023-05-19 RX ORDER — HEPARIN 100 UNIT/ML
500 SYRINGE INTRAVENOUS
Status: DISCONTINUED | OUTPATIENT
Start: 2023-05-19 | End: 2023-05-19 | Stop reason: HOSPADM

## 2023-05-19 RX ORDER — ATROPINE SULFATE 0.4 MG/ML
0.4 INJECTION, SOLUTION ENDOTRACHEAL; INTRAMEDULLARY; INTRAMUSCULAR; INTRAVENOUS; SUBCUTANEOUS ONCE AS NEEDED
Status: CANCELLED | OUTPATIENT
Start: 2023-05-20

## 2023-05-19 RX ORDER — HEPARIN 100 UNIT/ML
500 SYRINGE INTRAVENOUS
Status: CANCELLED | OUTPATIENT
Start: 2023-05-20

## 2023-05-19 RX ORDER — FAMOTIDINE 10 MG/ML
20 INJECTION INTRAVENOUS
Status: CANCELLED | OUTPATIENT
Start: 2023-05-20

## 2023-05-19 RX ORDER — EPINEPHRINE 0.3 MG/.3ML
0.3 INJECTION SUBCUTANEOUS ONCE AS NEEDED
Status: CANCELLED | OUTPATIENT
Start: 2023-05-20

## 2023-05-19 RX ORDER — HEPARIN 100 UNIT/ML
500 SYRINGE INTRAVENOUS
Status: CANCELLED | OUTPATIENT
Start: 2023-05-19

## 2023-05-19 RX ORDER — SODIUM CHLORIDE 0.9 % (FLUSH) 0.9 %
10 SYRINGE (ML) INJECTION
Status: DISCONTINUED | OUTPATIENT
Start: 2023-05-19 | End: 2023-05-19 | Stop reason: HOSPADM

## 2023-05-19 RX ORDER — SODIUM CHLORIDE 0.9 % (FLUSH) 0.9 %
10 SYRINGE (ML) INJECTION
Status: CANCELLED | OUTPATIENT
Start: 2023-05-19

## 2023-05-19 RX ORDER — DIPHENHYDRAMINE HYDROCHLORIDE 50 MG/ML
50 INJECTION INTRAMUSCULAR; INTRAVENOUS ONCE AS NEEDED
Status: CANCELLED | OUTPATIENT
Start: 2023-05-20

## 2023-05-19 RX ORDER — SODIUM CHLORIDE 0.9 % (FLUSH) 0.9 %
10 SYRINGE (ML) INJECTION
Status: CANCELLED | OUTPATIENT
Start: 2023-05-20

## 2023-05-19 RX ORDER — ATROPINE SULFATE 0.4 MG/ML
0.4 INJECTION, SOLUTION ENDOTRACHEAL; INTRAMEDULLARY; INTRAMUSCULAR; INTRAVENOUS; SUBCUTANEOUS ONCE AS NEEDED
Status: CANCELLED | OUTPATIENT
Start: 2023-05-19

## 2023-05-19 RX ORDER — ACETAMINOPHEN 325 MG/1
650 TABLET ORAL
Status: COMPLETED | OUTPATIENT
Start: 2023-05-19 | End: 2023-05-19

## 2023-05-19 RX ORDER — EPINEPHRINE 0.3 MG/.3ML
0.3 INJECTION SUBCUTANEOUS ONCE AS NEEDED
Status: DISCONTINUED | OUTPATIENT
Start: 2023-05-19 | End: 2023-05-19 | Stop reason: HOSPADM

## 2023-05-19 RX ORDER — FAMOTIDINE 10 MG/ML
20 INJECTION INTRAVENOUS
Status: COMPLETED | OUTPATIENT
Start: 2023-05-19 | End: 2023-05-19

## 2023-05-19 RX ORDER — DIPHENHYDRAMINE HYDROCHLORIDE 50 MG/ML
50 INJECTION INTRAMUSCULAR; INTRAVENOUS ONCE AS NEEDED
Status: CANCELLED | OUTPATIENT
Start: 2023-05-19

## 2023-05-19 RX ORDER — EPINEPHRINE 0.3 MG/.3ML
0.3 INJECTION SUBCUTANEOUS ONCE AS NEEDED
Status: CANCELLED | OUTPATIENT
Start: 2023-05-19

## 2023-05-19 RX ORDER — ACETAMINOPHEN 325 MG/1
650 TABLET ORAL
Status: CANCELLED | OUTPATIENT
Start: 2023-05-20

## 2023-05-19 RX ORDER — DIPHENHYDRAMINE HYDROCHLORIDE 50 MG/ML
50 INJECTION INTRAMUSCULAR; INTRAVENOUS ONCE AS NEEDED
Status: DISCONTINUED | OUTPATIENT
Start: 2023-05-19 | End: 2023-05-19 | Stop reason: HOSPADM

## 2023-05-19 RX ORDER — FAMOTIDINE 10 MG/ML
20 INJECTION INTRAVENOUS
Status: CANCELLED | OUTPATIENT
Start: 2023-05-19

## 2023-05-19 RX ORDER — ATROPINE SULFATE 0.4 MG/ML
0.4 INJECTION, SOLUTION ENDOTRACHEAL; INTRAMEDULLARY; INTRAMUSCULAR; INTRAVENOUS; SUBCUTANEOUS ONCE AS NEEDED
Status: DISCONTINUED | OUTPATIENT
Start: 2023-05-19 | End: 2023-05-19 | Stop reason: HOSPADM

## 2023-05-19 RX ORDER — ACETAMINOPHEN 325 MG/1
650 TABLET ORAL
Status: CANCELLED | OUTPATIENT
Start: 2023-05-19

## 2023-05-19 RX ADMIN — DIPHENHYDRAMINE HYDROCHLORIDE 25 MG: 50 INJECTION, SOLUTION INTRAMUSCULAR; INTRAVENOUS at 12:05

## 2023-05-19 RX ADMIN — SODIUM CHLORIDE: 9 INJECTION, SOLUTION INTRAVENOUS at 11:05

## 2023-05-19 RX ADMIN — Medication 10 ML: at 04:05

## 2023-05-19 RX ADMIN — HEPARIN 500 UNITS: 100 SYRINGE at 04:05

## 2023-05-19 RX ADMIN — PALONOSETRON HYDROCHLORIDE 0.25 MG: 0.25 INJECTION, SOLUTION INTRAVENOUS at 11:05

## 2023-05-19 RX ADMIN — ACETAMINOPHEN 650 MG: 325 TABLET ORAL at 11:05

## 2023-05-19 RX ADMIN — APREPITANT 130 MG: 130 INJECTION, EMULSION INTRAVENOUS at 11:05

## 2023-05-19 RX ADMIN — SACITUZUMAB GOVITECAN 1440 MG: 180 POWDER, FOR SOLUTION INTRAVENOUS at 12:05

## 2023-05-19 RX ADMIN — FAMOTIDINE 20 MG: 10 INJECTION INTRAVENOUS at 11:05

## 2023-05-19 NOTE — PLAN OF CARE
1650 Pt tolerated C1 Trodelvy infusion well with no complaints or complications, VSS throughout treatment, including 30 min post infusion obs. Educated patient on medications administered including side effects, possible reactions, and chemotherapy precautions, verbalized understanding. Pt aware to call provider with any questions or concerns, aware of upcoming appts, ambulatory from clinic with steady gait, no distress noted.

## 2023-05-19 NOTE — TELEPHONE ENCOUNTER
Spoke to Natasha ta/ [facility] regarding CPAP supplies for pt. Informed Natasha that pt hasn't been seen by Dr. Arroyo in 2 years .CPAP will have to be ordered by provider that ordered CPAP. Natasha verbalized understanding.

## 2023-05-19 NOTE — TELEPHONE ENCOUNTER
Inform  this that this will need to be ordered by the physician that ordered his CPAP. I have not seen him in 2 years.

## 2023-05-22 ENCOUNTER — TELEPHONE (OUTPATIENT)
Dept: FAMILY MEDICINE | Facility: CLINIC | Age: 48
End: 2023-05-22
Payer: MEDICARE

## 2023-05-22 NOTE — TELEPHONE ENCOUNTER
----- Message from Shannon Coles sent at 5/22/2023  9:37 AM CDT -----  Contact: Michelle  Type:  RX Refill Request    Who Called:  [facility]  Refill or New Rx:   RX Name and Strength:  cpap supply    Ordering Provider:  Cruz Saleh Call Back Number:  [phone]  Additional Information:  ref # [id number]  Thank you

## 2023-05-22 NOTE — TELEPHONE ENCOUNTER
[facility] explained the same thing that Estephanie did on 05/18. She verbalized understanding.

## 2023-05-25 ENCOUNTER — PATIENT MESSAGE (OUTPATIENT)
Dept: HEMATOLOGY/ONCOLOGY | Facility: CLINIC | Age: 48
End: 2023-05-25
Payer: MEDICARE

## 2023-05-25 ENCOUNTER — DOCUMENTATION ONLY (OUTPATIENT)
Dept: HEMATOLOGY/ONCOLOGY | Facility: CLINIC | Age: 48
End: 2023-05-25

## 2023-05-25 ENCOUNTER — HOSPITAL ENCOUNTER (OUTPATIENT)
Facility: HOSPITAL | Age: 48
LOS: 1 days | Discharge: HOME | End: 2023-05-26
Attending: EMERGENCY MEDICINE | Admitting: INTERNAL MEDICINE
Payer: MEDICARE

## 2023-05-25 DIAGNOSIS — R05.9 COUGH: ICD-10-CM

## 2023-05-25 DIAGNOSIS — C50.021 MALIGNANT NEOPLASM INVOLVING BOTH NIPPLE AND AREOLA OF RIGHT BREAST IN MALE, ESTROGEN RECEPTOR NEGATIVE: Primary | ICD-10-CM

## 2023-05-25 DIAGNOSIS — C50.021 MALIGNANT NEOPLASM INVOLVING BOTH NIPPLE AND AREOLA OF RIGHT BREAST IN MALE, ESTROGEN RECEPTOR NEGATIVE: ICD-10-CM

## 2023-05-25 DIAGNOSIS — Z17.1 MALIGNANT NEOPLASM INVOLVING BOTH NIPPLE AND AREOLA OF RIGHT BREAST IN MALE, ESTROGEN RECEPTOR NEGATIVE: Primary | ICD-10-CM

## 2023-05-25 DIAGNOSIS — R19.7 DIARRHEA, UNSPECIFIED TYPE: ICD-10-CM

## 2023-05-25 DIAGNOSIS — R07.9 CHEST PAIN: ICD-10-CM

## 2023-05-25 DIAGNOSIS — Z17.1 MALIGNANT NEOPLASM INVOLVING BOTH NIPPLE AND AREOLA OF RIGHT BREAST IN MALE, ESTROGEN RECEPTOR NEGATIVE: ICD-10-CM

## 2023-05-25 LAB
ADENOVIRUS: NOT DETECTED
ANION GAP SERPL CALC-SCNC: 9 MMOL/L (ref 8–16)
ANISOCYTOSIS BLD QL SMEAR: SLIGHT
BASOPHILS # BLD AUTO: 0.01 K/UL (ref 0–0.2)
BASOPHILS NFR BLD: 0.5 % (ref 0–1.9)
BORDETELLA PARAPERTUSSIS (IS1001): NOT DETECTED
BORDETELLA PERTUSSIS (PTXP): NOT DETECTED
BUN SERPL-MCNC: 20 MG/DL (ref 6–20)
CALCIUM SERPL-MCNC: 8.3 MG/DL (ref 8.7–10.5)
CHLAMYDIA PNEUMONIAE: NOT DETECTED
CHLORIDE SERPL-SCNC: 106 MMOL/L (ref 95–110)
CO2 SERPL-SCNC: 23 MMOL/L (ref 23–29)
CORONAVIRUS 229E, COMMON COLD VIRUS: NOT DETECTED
CORONAVIRUS HKU1, COMMON COLD VIRUS: NOT DETECTED
CORONAVIRUS NL63, COMMON COLD VIRUS: NOT DETECTED
CORONAVIRUS OC43, COMMON COLD VIRUS: NOT DETECTED
CREAT SERPL-MCNC: 1.2 MG/DL (ref 0.5–1.4)
DIFFERENTIAL METHOD: ABNORMAL
EOSINOPHIL # BLD AUTO: 0 K/UL (ref 0–0.5)
EOSINOPHIL NFR BLD: 1.5 % (ref 0–8)
ERYTHROCYTE [DISTWIDTH] IN BLOOD BY AUTOMATED COUNT: 17.2 % (ref 11.5–14.5)
EST. GFR  (NO RACE VARIABLE): >60 ML/MIN/1.73 M^2
FLUBV RNA NPH QL NAA+NON-PROBE: NOT DETECTED
GLUCOSE SERPL-MCNC: 94 MG/DL (ref 70–110)
HCT VFR BLD AUTO: 31.5 % (ref 40–54)
HCV AB SERPL QL IA: NORMAL
HGB BLD-MCNC: 9.6 G/DL (ref 14–18)
HIV 1+2 AB+HIV1 P24 AG SERPL QL IA: NORMAL
HPIV1 RNA NPH QL NAA+NON-PROBE: NOT DETECTED
HPIV2 RNA NPH QL NAA+NON-PROBE: NOT DETECTED
HPIV3 RNA NPH QL NAA+NON-PROBE: NOT DETECTED
HPIV4 RNA NPH QL NAA+NON-PROBE: NOT DETECTED
HUMAN METAPNEUMOVIRUS: NOT DETECTED
IMM GRANULOCYTES # BLD AUTO: 0.04 K/UL (ref 0–0.04)
IMM GRANULOCYTES NFR BLD AUTO: 2 % (ref 0–0.5)
INFLUENZA A (SUBTYPES H1,H1-2009,H3): NOT DETECTED
INFLUENZA A, MOLECULAR: NEGATIVE
INFLUENZA B, MOLECULAR: NEGATIVE
LYMPHOCYTES # BLD AUTO: 1.1 K/UL (ref 1–4.8)
LYMPHOCYTES NFR BLD: 53.7 % (ref 18–48)
MAGNESIUM SERPL-MCNC: 1.7 MG/DL (ref 1.6–2.6)
MCH RBC QN AUTO: 23.4 PG (ref 27–31)
MCHC RBC AUTO-ENTMCNC: 30.5 G/DL (ref 32–36)
MCV RBC AUTO: 77 FL (ref 82–98)
MONOCYTES # BLD AUTO: 0.1 K/UL (ref 0.3–1)
MONOCYTES NFR BLD: 6.3 % (ref 4–15)
MYCOPLASMA PNEUMONIAE: NOT DETECTED
NEUTROPHILS # BLD AUTO: 0.7 K/UL (ref 1.8–7.7)
NEUTROPHILS NFR BLD: 36 % (ref 38–73)
NRBC BLD-RTO: 0 /100 WBC
PLATELET # BLD AUTO: 232 K/UL (ref 150–450)
PLATELET BLD QL SMEAR: ABNORMAL
PMV BLD AUTO: 10 FL (ref 9.2–12.9)
POCT GLUCOSE: 116 MG/DL (ref 70–110)
POTASSIUM SERPL-SCNC: 4.1 MMOL/L (ref 3.5–5.1)
RBC # BLD AUTO: 4.11 M/UL (ref 4.6–6.2)
RESPIRATORY INFECTION PANEL SOURCE: ABNORMAL
RSV RNA NPH QL NAA+NON-PROBE: NOT DETECTED
RV+EV RNA NPH QL NAA+NON-PROBE: DETECTED
SARS-COV-2 RDRP RESP QL NAA+PROBE: NEGATIVE
SARS-COV-2 RNA RESP QL NAA+PROBE: NOT DETECTED
SODIUM SERPL-SCNC: 138 MMOL/L (ref 136–145)
SPECIMEN SOURCE: NORMAL
TARGETS BLD QL SMEAR: ABNORMAL
TROPONIN I SERPL DL<=0.01 NG/ML-MCNC: 0.01 NG/ML (ref 0–0.03)
WBC # BLD AUTO: 2.05 K/UL (ref 3.9–12.7)

## 2023-05-25 PROCEDURE — 87389 HIV-1 AG W/HIV-1&-2 AB AG IA: CPT | Performed by: PHYSICIAN ASSISTANT

## 2023-05-25 PROCEDURE — 87633 RESP VIRUS 12-25 TARGETS: CPT

## 2023-05-25 PROCEDURE — 85025 COMPLETE CBC W/AUTO DIFF WBC: CPT | Mod: 91 | Performed by: EMERGENCY MEDICINE

## 2023-05-25 PROCEDURE — 63600175 PHARM REV CODE 636 W HCPCS

## 2023-05-25 PROCEDURE — 99285 EMERGENCY DEPT VISIT HI MDM: CPT | Mod: 25

## 2023-05-25 PROCEDURE — 87502 INFLUENZA DNA AMP PROBE: CPT

## 2023-05-25 PROCEDURE — 99285 EMERGENCY DEPT VISIT HI MDM: CPT | Mod: ,,, | Performed by: EMERGENCY MEDICINE

## 2023-05-25 PROCEDURE — 99223 PR INITIAL HOSPITAL CARE,LEVL III: ICD-10-PCS | Mod: AI,GC,, | Performed by: INTERNAL MEDICINE

## 2023-05-25 PROCEDURE — 87502 INFLUENZA DNA AMP PROBE: CPT | Mod: 59 | Performed by: EMERGENCY MEDICINE

## 2023-05-25 PROCEDURE — 93010 EKG 12-LEAD: ICD-10-PCS | Mod: ,,, | Performed by: INTERNAL MEDICINE

## 2023-05-25 PROCEDURE — 87798 DETECT AGENT NOS DNA AMP: CPT | Mod: 59

## 2023-05-25 PROCEDURE — 83735 ASSAY OF MAGNESIUM: CPT | Performed by: EMERGENCY MEDICINE

## 2023-05-25 PROCEDURE — U0002 COVID-19 LAB TEST NON-CDC: HCPCS | Performed by: EMERGENCY MEDICINE

## 2023-05-25 PROCEDURE — 25000003 PHARM REV CODE 250

## 2023-05-25 PROCEDURE — 25000003 PHARM REV CODE 250: Performed by: EMERGENCY MEDICINE

## 2023-05-25 PROCEDURE — 94761 N-INVAS EAR/PLS OXIMETRY MLT: CPT

## 2023-05-25 PROCEDURE — 80048 BASIC METABOLIC PNL TOTAL CA: CPT | Performed by: EMERGENCY MEDICINE

## 2023-05-25 PROCEDURE — 99285 PR EMERGENCY DEPT VISIT,LEVEL V: ICD-10-PCS | Mod: ,,, | Performed by: EMERGENCY MEDICINE

## 2023-05-25 PROCEDURE — 93005 ELECTROCARDIOGRAM TRACING: CPT

## 2023-05-25 PROCEDURE — 20600001 HC STEP DOWN PRIVATE ROOM

## 2023-05-25 PROCEDURE — 27000207 HC ISOLATION

## 2023-05-25 PROCEDURE — 93010 ELECTROCARDIOGRAM REPORT: CPT | Mod: ,,, | Performed by: INTERNAL MEDICINE

## 2023-05-25 PROCEDURE — 87040 BLOOD CULTURE FOR BACTERIA: CPT | Mod: 59

## 2023-05-25 PROCEDURE — 99223 1ST HOSP IP/OBS HIGH 75: CPT | Mod: AI,GC,, | Performed by: INTERNAL MEDICINE

## 2023-05-25 PROCEDURE — 86803 HEPATITIS C AB TEST: CPT | Performed by: PHYSICIAN ASSISTANT

## 2023-05-25 PROCEDURE — 84484 ASSAY OF TROPONIN QUANT: CPT | Performed by: EMERGENCY MEDICINE

## 2023-05-25 RX ORDER — GABAPENTIN 300 MG/1
900 CAPSULE ORAL 3 TIMES DAILY
Status: DISCONTINUED | OUTPATIENT
Start: 2023-05-25 | End: 2023-05-26 | Stop reason: HOSPADM

## 2023-05-25 RX ORDER — DEXTROSE 40 %
15 GEL (GRAM) ORAL
Status: DISCONTINUED | OUTPATIENT
Start: 2023-05-25 | End: 2023-05-26 | Stop reason: HOSPADM

## 2023-05-25 RX ORDER — INSULIN ASPART 100 [IU]/ML
0-5 INJECTION, SOLUTION INTRAVENOUS; SUBCUTANEOUS
Status: DISCONTINUED | OUTPATIENT
Start: 2023-05-25 | End: 2023-05-26 | Stop reason: HOSPADM

## 2023-05-25 RX ORDER — TRAZODONE HYDROCHLORIDE 100 MG/1
100 TABLET ORAL NIGHTLY PRN
Status: DISCONTINUED | OUTPATIENT
Start: 2023-05-25 | End: 2023-05-26 | Stop reason: HOSPADM

## 2023-05-25 RX ORDER — DEXTROSE 40 %
30 GEL (GRAM) ORAL
Status: DISCONTINUED | OUTPATIENT
Start: 2023-05-25 | End: 2023-05-26 | Stop reason: HOSPADM

## 2023-05-25 RX ORDER — ENOXAPARIN SODIUM 100 MG/ML
40 INJECTION SUBCUTANEOUS EVERY 12 HOURS
Status: DISCONTINUED | OUTPATIENT
Start: 2023-05-25 | End: 2023-05-26 | Stop reason: HOSPADM

## 2023-05-25 RX ORDER — NALOXONE HCL 0.4 MG/ML
0.02 VIAL (ML) INJECTION
Status: DISCONTINUED | OUTPATIENT
Start: 2023-05-25 | End: 2023-05-26 | Stop reason: HOSPADM

## 2023-05-25 RX ORDER — PROCHLORPERAZINE EDISYLATE 5 MG/ML
5 INJECTION INTRAMUSCULAR; INTRAVENOUS EVERY 6 HOURS PRN
Status: DISCONTINUED | OUTPATIENT
Start: 2023-05-25 | End: 2023-05-26 | Stop reason: HOSPADM

## 2023-05-25 RX ORDER — OXYMETAZOLINE HCL 0.05 %
1 SPRAY, NON-AEROSOL (ML) NASAL 2 TIMES DAILY PRN
Status: DISCONTINUED | OUTPATIENT
Start: 2023-05-25 | End: 2023-05-26 | Stop reason: HOSPADM

## 2023-05-25 RX ORDER — DIPHENOXYLATE HYDROCHLORIDE AND ATROPINE SULFATE 2.5; .025 MG/1; MG/1
1 TABLET ORAL 4 TIMES DAILY PRN
Qty: 60 TABLET | Refills: 2 | Status: SHIPPED | OUTPATIENT
Start: 2023-05-25

## 2023-05-25 RX ORDER — AMLODIPINE BESYLATE 10 MG/1
10 TABLET ORAL DAILY
Status: DISCONTINUED | OUTPATIENT
Start: 2023-05-26 | End: 2023-05-26 | Stop reason: HOSPADM

## 2023-05-25 RX ORDER — TALC
6 POWDER (GRAM) TOPICAL NIGHTLY PRN
Status: DISCONTINUED | OUTPATIENT
Start: 2023-05-25 | End: 2023-05-26 | Stop reason: HOSPADM

## 2023-05-25 RX ORDER — SODIUM CHLORIDE 0.9 % (FLUSH) 0.9 %
10 SYRINGE (ML) INJECTION EVERY 12 HOURS PRN
Status: DISCONTINUED | OUTPATIENT
Start: 2023-05-25 | End: 2023-05-26 | Stop reason: HOSPADM

## 2023-05-25 RX ORDER — DIPHENOXYLATE HYDROCHLORIDE AND ATROPINE SULFATE 2.5; .025 MG/1; MG/1
1 TABLET ORAL 4 TIMES DAILY PRN
Status: DISCONTINUED | OUTPATIENT
Start: 2023-05-25 | End: 2023-05-26 | Stop reason: HOSPADM

## 2023-05-25 RX ORDER — GLUCAGON 1 MG
1 KIT INJECTION
Status: DISCONTINUED | OUTPATIENT
Start: 2023-05-25 | End: 2023-05-26 | Stop reason: HOSPADM

## 2023-05-25 RX ORDER — ACETAMINOPHEN 325 MG/1
650 TABLET ORAL EVERY 4 HOURS PRN
Status: DISCONTINUED | OUTPATIENT
Start: 2023-05-25 | End: 2023-05-26 | Stop reason: HOSPADM

## 2023-05-25 RX ORDER — ONDANSETRON 2 MG/ML
4 INJECTION INTRAMUSCULAR; INTRAVENOUS EVERY 8 HOURS PRN
Status: DISCONTINUED | OUTPATIENT
Start: 2023-05-25 | End: 2023-05-26 | Stop reason: HOSPADM

## 2023-05-25 RX ADMIN — SODIUM CHLORIDE 1000 ML: 9 INJECTION, SOLUTION INTRAVENOUS at 03:05

## 2023-05-25 RX ADMIN — ENOXAPARIN SODIUM 40 MG: 40 INJECTION SUBCUTANEOUS at 08:05

## 2023-05-25 RX ADMIN — OXYMETAZOLINE HYDROCHLORIDE 1 SPRAY: 0.05 SPRAY NASAL at 10:05

## 2023-05-25 RX ADMIN — TRAZODONE HYDROCHLORIDE 100 MG: 100 TABLET ORAL at 10:05

## 2023-05-25 RX ADMIN — GABAPENTIN 900 MG: 300 CAPSULE ORAL at 10:05

## 2023-05-25 NOTE — SUBJECTIVE & OBJECTIVE
Oncology Treatment Plan:   OP SACITUZUMAB GOVITECAN-HZIY Q3W    Medications:  Continuous Infusions:  Scheduled Meds:   enoxparin  40 mg Subcutaneous Q12H     PRN Meds:acetaminophen, dextrose 10%, dextrose 10%, dextrose, dextrose, glucagon (human recombinant), insulin aspart U-100, melatonin, naloxone, ondansetron, prochlorperazine, sodium chloride 0.9%     Review of patient's allergies indicates:  No Known Allergies     Past Medical History:   Diagnosis Date    Breast cancer     Cancer     R breast    Diabetes mellitus     HTN (hypertension)     Leg edema, right     Prediabetes     Seizures     at age 18 - stress related    Therapy     marital counseling     Past Surgical History:   Procedure Laterality Date    AXILLARY NODE DISSECTION Right 11/22/2019    Procedure: LYMPHADENECTOMY, AXILLARY RIGHT;  Surgeon: Shima Whyte MD;  Location: [facility];  Service: General;  Laterality: Right;    AXILLARY NODE DISSECTION Right 12/17/2019    Procedure: LYMPHADENECTOMY, AXILLARY;  Surgeon: Shima Whyte MD;  Location: [address];  Service: General;  Laterality: Right;    BREAST BIOPSY      EXCISION OF HYDROCELE Right 10/28/2022    Procedure: HYDROCELECTOMY;  Surgeon: Anthony Cordero Jr., MD;  Location: [address];  Service: Urology;  Laterality: Right;  1 hour    INSERTION OF TUNNELED CENTRAL VENOUS CATHETER (CVC) WITH SUBCUTANEOUS PORT Left 5/24/2019    Procedure: [id number];  Surgeon: Shima Whyte MD;  Location: [address];  Service: General;  Laterality: Left;    INSERTION OF TUNNELED CENTRAL VENOUS CATHETER (CVC) WITH SUBCUTANEOUS PORT Left 9/17/2021    Procedure: INSERTION, SINGLE LUMEN CATHETER WITH PORT, WITH FLUOROSCOPIC GUIDANCE, right;  Surgeon: Gloria Holliday MD;  Location: [address];  Service: General;  Laterality: Left;  rapid covid test    SENTINEL LYMPH NODE BIOPSY Right 11/22/2019    Procedure: BIOPSY, LYMPH NODE, SENTINEL RIGHT;  Surgeon: Shima Whyte MD;  Location: [facility]  OR;  Service: General;  Laterality: Right;    SIMPLE MASTECTOMY Right 2019    Procedure: MASTECTOMY, SIMPLE RIGHT (CONSENT AM OF) 2.5 hr case;  Surgeon: Shima Whyte MD;  Location: [facility] OR;  Service: General;  Laterality: Right;     Family History       Problem Relation (Age of Onset)    Breast cancer Paternal Grandmother    Colon cancer Maternal Grandfather    Diabetes Mother    Fibromyalgia Paternal Aunt    Hypertension Mother, Father    Lupus Father, Paternal Aunt    No Known Problems Maternal Grandmother, Paternal Grandfather, Sister, Maternal Aunt, Maternal Uncle, Paternal Uncle, Brother, Sister, Son, Son, Son, Son    Post-traumatic stress disorder Brother          Tobacco Use    Smoking status: Former     Packs/day: 0.25     Years: 15.00     Pack years: 3.75     Types: Cigarettes, Vaping with nicotine     Quit date: 2014     Years since quittin.5    Smokeless tobacco: Never    Tobacco comments:     Social smoker with alcohol    Substance and Sexual Activity    Alcohol use: Yes     Alcohol/week: 0.0 standard drinks     Comment: Rarely    Drug use: Not Currently     Types: Marijuana    Sexual activity: Yes     Partners: Female     Birth control/protection: None       Review of Systems   Constitutional:  Negative for chills and fever.   HENT:  Negative for rhinorrhea and sneezing.    Respiratory:  Positive for cough. Negative for shortness of breath.    Cardiovascular:  Negative for chest pain and leg swelling.   Gastrointestinal:  Positive for diarrhea. Negative for abdominal pain and nausea.   Genitourinary:  Negative for dysuria and hematuria.   Musculoskeletal:  Negative for arthralgias and myalgias.   Neurological:  Positive for weakness and light-headedness. Negative for tremors and headaches.   Psychiatric/Behavioral:  Negative for agitation and confusion.    Objective:     Vital Signs (Most Recent):  Temp: 98.5 °F (36.9 °C) (23 1327)  Pulse: 68 (23 1618)  Resp: 19 (23  1515)  BP: (!) 117/58 (05/25/23 1418)  SpO2: 100 % (05/25/23 1618) Vital Signs (24h Range):  Temp:  [98.5 °F (36.9 °C)] 98.5 °F (36.9 °C)  Pulse:  [67-74] 68  Resp:  [11-20] 19  SpO2:  [95 %-100 %] 100 %  BP: (117-125)/(58) 117/58     Weight: (!) 153.8 kg (339 lb)  Body mass index is 44.73 kg/m².  Body surface area is 2.81 meters squared.    No intake or output data in the 24 hours ending 05/25/23 1711     Physical Exam  Vitals and nursing note reviewed.   Constitutional:       General: He is not in acute distress.  HENT:      Head: Normocephalic and atraumatic.      Nose: Nose normal. No rhinorrhea.   Eyes:      Extraocular Movements: Extraocular movements intact.      Conjunctiva/sclera: Conjunctivae normal.   Cardiovascular:      Rate and Rhythm: Normal rate and regular rhythm.   Pulmonary:      Effort: Pulmonary effort is normal. No respiratory distress.      Breath sounds: Rales present.   Abdominal:      General: There is no distension.      Palpations: Abdomen is soft.   Musculoskeletal:      Right lower leg: No edema.      Left lower leg: No edema.   Skin:     General: Skin is warm and dry.      Capillary Refill: Capillary refill takes less than 2 seconds.   Neurological:      General: No focal deficit present.      Mental Status: He is alert and oriented to person, place, and time.        Significant Labs:   CBC:   Recent Labs   Lab 05/25/23  1235 05/25/23  1512   WBC 2.30* 2.05*   HGB 10.3* 9.6*   HCT 33.2* 31.5*    232    and CMP:   Recent Labs   Lab 05/25/23  1235 05/25/23  1512    138   K 4.3 4.1    106   CO2 19* 23   * 94   BUN 18 20   CREATININE 1.2 1.2   CALCIUM 8.5* 8.3*   PROT 7.7  --    ALBUMIN 3.2*  --    BILITOT 0.8  --    ALKPHOS 187*  --    AST 23  --    ALT 52*  --    ANIONGAP 12 9       Diagnostic Results:  I have reviewed all pertinent imaging results/findings within the past 24 hours.

## 2023-05-25 NOTE — PROGRESS NOTES
Pharmacist Dose Adjustment Note    Paul Li Jr. is a 47 y.o. male being treated with enoxaparin 40 mg every 24 hours for VTE PPx    Patient Data:    Vital Signs (Most Recent):  Temp: 98.5 °F (36.9 °C) (05/25/23 1327)  Pulse: 74 (05/25/23 1327)  Resp: 20 (05/25/23 1327)  BP: (!) 125/58 (05/25/23 1327)  SpO2: 97 % (05/25/23 1327) Vital Signs (72h Range):  Temp:  [98.5 °F (36.9 °C)]   Pulse:  [74]   Resp:  [20]   BP: (125)/(58)   SpO2:  [97 %]      Recent Labs   Lab 05/19/23  1012 05/25/23  1235   CREATININE 0.9 1.2     Serum creatinine: 1.2 mg/dL 05/25/23 1235  Estimated creatinine clearance: 120.4 mL/min    Adjusted to   Enoxaparin 40 mg every 12 hours, based on obesity BMI > 40 kg/m2 per pharmacy anticoagulation protocol.    Pharmacist's Name: Prudencio Larose  Pharmacist's Extension: [phone]

## 2023-05-25 NOTE — ASSESSMENT & PLAN NOTE
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 He was advised per Dr. Linn to visit the ED. WBC 2.30, ANC 1.1, CXR with non specific pneumonitis. The patient is afebrile. Not suspecting infectious process at this time, though we will obtain infectious workup.     - f/u stool studies   - Order blood cultures   - respiratory infection panel   - f/u UA  - prn antiemetics  - prn pain medications   - diphenoxylate prn

## 2023-05-25 NOTE — PROGRESS NOTES
With infectious symptoms he should come get a cbc/diff, cmp and a stool cup to test for infection   I also added a pharmacogenomic test for UGT1A1 for labs today   All orders placed   He needs to come and do labs now now and have an urgent care follow up tomorrow   Once results come back we can determine if additional steps or meds needed       Spoke to pt in waiting room   Completed labs but too weak to drive home--   2 episodes of diarrhea-- was here to get labs and stool specimen   Weak, lethargic, dizzy   Was drinking water   Was exposed to his grandson today who is in the ED right now with 103 fever   Sent pt to ED; called charge nurse - gave report including dx and recent tx and specific labs and specimens       Please save to chart   Dr. Sin notified of likely admit

## 2023-05-25 NOTE — H&P
[facility] Emergency Dept  Hematology/Oncology  H&P    Patient Name: Paul Li Jr.  MRN: [id number]  Admission Date: 5/25/2023  Code Status: Full Code   Attending Provider: Luis F Sin MD  Primary Care Physician: Kareem Arroyo MD  Principal Problem:Chemotherapy-induced neutropenia    Subjective:     HPI: 47M  with hx  Metastatic triple negative breast cancer with BRCA1 mutation (on sacituzumab-govetican Q3w cycle 4, last 5/17), T2DM, HTN, presenting with diarrhea for last couple days, weakness and neutropenia. She was advised per Dr. Linn to visit the ED. In addition to persistent diarrhea, several black stools noted. He denies any fevers, chills, chest pain, palpitations, or dysuria. He reports his grandchild and wife both have fevers.     ED workup notable for wbc (2.30), Hgb 10.3, MCV 76, ANC 1.1, Alk P 187, and CXR with coarse interstitial attenuation with nonspecific pneumonitis. Medical oncology will admit for further infectious workup.           Oncology Treatment Plan:   OP SACITUZUMAB GOVITECAN-HZIY Q3W    Medications:  Continuous Infusions:  Scheduled Meds:   enoxparin  40 mg Subcutaneous Q12H     PRN Meds:acetaminophen, dextrose 10%, dextrose 10%, dextrose, dextrose, glucagon (human recombinant), insulin aspart U-100, melatonin, naloxone, ondansetron, prochlorperazine, sodium chloride 0.9%     Review of patient's allergies indicates:  No Known Allergies     Past Medical History:   Diagnosis Date    Breast cancer     Cancer     R breast    Diabetes mellitus     HTN (hypertension)     Leg edema, right     Prediabetes     Seizures     at age 18 - stress related    Therapy     marital counseling     Past Surgical History:   Procedure Laterality Date    AXILLARY NODE DISSECTION Right 11/22/2019    Procedure: LYMPHADENECTOMY, AXILLARY RIGHT;  Surgeon: Shima Whyte MD;  Location: [facility];  Service: General;  Laterality: Right;    AXILLARY NODE DISSECTION Right 12/17/2019     Procedure: LYMPHADENECTOMY, AXILLARY;  Surgeon: Shima Whyte MD;  Location: [facility] OR [address] [place] [address];  Service: General;  Laterality: Right;    BREAST BIOPSY      EXCISION OF HYDROCELE Right 10/28/2022    Procedure: HYDROCELECTOMY;  Surgeon: Anthony Cordero Jr., MD;  Location: [facility] OR [address] [place] [address];  Service: Urology;  Laterality: Right;  1 hour    INSERTION OF TUNNELED CENTRAL VENOUS CATHETER (CVC) WITH SUBCUTANEOUS PORT Left 2019    Procedure: [id number];  Surgeon: Shima Whyte MD;  Location: [facility] OR [address] [place] [address];  Service: General;  Laterality: Left;    INSERTION OF TUNNELED CENTRAL VENOUS CATHETER (CVC) WITH SUBCUTANEOUS PORT Left 2021    Procedure: INSERTION, SINGLE LUMEN CATHETER WITH PORT, WITH FLUOROSCOPIC GUIDANCE, right;  Surgeon: Gloria Holliday MD;  Location: [facility] OR [address] [place] [address];  Service: General;  Laterality: Left;  rapid covid test    SENTINEL LYMPH NODE BIOPSY Right 2019    Procedure: BIOPSY, LYMPH NODE, SENTINEL RIGHT;  Surgeon: Shima Whyte MD;  Location: [facility];  Service: General;  Laterality: Right;    SIMPLE MASTECTOMY Right 2019    Procedure: MASTECTOMY, SIMPLE RIGHT (CONSENT AM OF) 2.5 hr case;  Surgeon: Shima Whyte MD;  Location: [facility];  Service: General;  Laterality: Right;     Family History       Problem Relation (Age of Onset)    Breast cancer Paternal Grandmother    Colon cancer Maternal Grandfather    Diabetes Mother    Fibromyalgia Paternal Aunt    Hypertension Mother, Father    Lupus Father, Paternal Aunt    No Known Problems Maternal Grandmother, Paternal Grandfather, Sister, Maternal Aunt, Maternal Uncle, Paternal Uncle, Brother, Sister, Son, Son, Son, Son    Post-traumatic stress disorder Brother          Tobacco Use    Smoking status: Former     Packs/day: 0.25     Years: 15.00     Pack years: 3.75     Types: Cigarettes, Vaping with nicotine     Quit date: 2014     Years since quittin.5    Smokeless tobacco: Never    Tobacco comments:      Social smoker with alcohol    Substance and Sexual Activity    Alcohol use: Yes     Alcohol/week: 0.0 standard drinks     Comment: Rarely    Drug use: Not Currently     Types: Marijuana    Sexual activity: Yes     Partners: Female     Birth control/protection: None       Review of Systems   Constitutional:  Negative for chills and fever.   HENT:  Negative for rhinorrhea and sneezing.    Respiratory:  Positive for cough. Negative for shortness of breath.    Cardiovascular:  Negative for chest pain and leg swelling.   Gastrointestinal:  Positive for diarrhea. Negative for abdominal pain and nausea.   Genitourinary:  Negative for dysuria and hematuria.   Musculoskeletal:  Negative for arthralgias and myalgias.   Neurological:  Positive for weakness and light-headedness. Negative for tremors and headaches.   Psychiatric/Behavioral:  Negative for agitation and confusion.    Objective:     Vital Signs (Most Recent):  Temp: 98.5 °F (36.9 °C) (05/25/23 1327)  Pulse: 68 (05/25/23 1618)  Resp: 19 (05/25/23 1515)  BP: (!) 117/58 (05/25/23 1418)  SpO2: 100 % (05/25/23 1618) Vital Signs (24h Range):  Temp:  [98.5 °F (36.9 °C)] 98.5 °F (36.9 °C)  Pulse:  [67-74] 68  Resp:  [11-20] 19  SpO2:  [95 %-100 %] 100 %  BP: (117-125)/(58) 117/58     Weight: (!) 153.8 kg (339 lb)  Body mass index is 44.73 kg/m².  Body surface area is 2.81 meters squared.    No intake or output data in the 24 hours ending 05/25/23 1711     Physical Exam  Vitals and nursing note reviewed.   Constitutional:       General: He is not in acute distress.  HENT:      Head: Normocephalic and atraumatic.      Nose: Nose normal. No rhinorrhea.   Eyes:      Extraocular Movements: Extraocular movements intact.      Conjunctiva/sclera: Conjunctivae normal.   Cardiovascular:      Rate and Rhythm: Normal rate and regular rhythm.   Pulmonary:      Effort: Pulmonary effort is normal. No respiratory distress.      Breath sounds: Rales present.   Abdominal:      General:  There is no distension.      Palpations: Abdomen is soft.   Musculoskeletal:      Right lower leg: No edema.      Left lower leg: No edema.   Skin:     General: Skin is warm and dry.      Capillary Refill: Capillary refill takes less than 2 seconds.   Neurological:      General: No focal deficit present.      Mental Status: He is alert and oriented to person, place, and time.        Significant Labs:   CBC:   Recent Labs   Lab 05/25/23  1235 05/25/23  1512   WBC 2.30* 2.05*   HGB 10.3* 9.6*   HCT 33.2* 31.5*    232    and CMP:   Recent Labs   Lab 05/25/23  1235 05/25/23  1512    138   K 4.3 4.1    106   CO2 19* 23   * 94   BUN 18 20   CREATININE 1.2 1.2   CALCIUM 8.5* 8.3*   PROT 7.7  --    ALBUMIN 3.2*  --    BILITOT 0.8  --    ALKPHOS 187*  --    AST 23  --    ALT 52*  --    ANIONGAP 12 9       Diagnostic Results:  I have reviewed all pertinent imaging results/findings within the past 24 hours.    Assessment/Plan:     * Chemotherapy-induced neutropenia  47M with hx  Metastatic triple negative breast cancer with BRCA1 mutation here with diarrhea, weakness and neutropenia. He was advised per Dr. Linn to visit the ED. WBC 2.30, ANC 1.1, CXR with non specific pneumonitis. The patient is afebrile. Not suspecting infectious process at this time, though we will obtain infectious workup.     - f/u stool studies   - Order blood cultures   - respiratory infection panel   - f/u UA  - prn antiemetics  - prn pain medications   - diphenoxylate prn    Malignant neoplasm involving both nipple and areola of right breast in male, estrogen receptor negative  47M with hx  Metastatic triple negative breast cancer with BRCA1 mutation (on sacituzumab-govetican Q3w cycle 4, last 5/17). Following Dr. Linn.     T2DM (type 2 diabetes mellitus)  LDSSI prn, maintain BG between 140-180     Essential hypertension  Continue amlodipine      Bogdan Reyes MD  Hematology/Oncology  [facility] - [facility] Dept

## 2023-05-25 NOTE — ED NOTES
Patient identifiers verified and correct for Paul Li  LOC: The patient is awake, alert and aware of environment with an appropriate affect, the patient is oriented x 3 and speaking appropriately.   APPEARANCE: Patient appears comfortable and in no acute distress, patient is clean and well groomed. Pt c/o nasal sinus congestion  SKIN: The skin is warm and dry, color consistent with ethnicity, patient has normal skin turgor and moist mucus membranes, skin intact, no breakdown or bruising noted.   MUSCULOSKELETAL: Patient moving all extremities spontaneously, no swelling noted.  RESPIRATORY: Airway is open and patent, respirations are spontaneous, patient has a normal effort and rate, no accessory muscle use noted, pt placed on continuous pulse ox with O2 sats noted at 97% on room air.  CARDIAC: Pt placed on cardiac monitor. Patient has a normal rate and regular rhythm, no edema noted, capillary refill < 3 seconds. Pt c/o chest tightness  GASTRO: Soft and non tender to palpation, no distention noted, normoactive bowel sounds present in all four quadrants. Pt states large amount of diarrhea since 3am this morning, dark and watery  : Pt denies any pain or frequency with urination.  NEURO: Pt opens eyes spontaneously, behavior appropriate to situation, follows commands, facial expression symmetrical, bilateral hand grasp equal and even, purposeful motor response noted, normal sensation in all extremities when touched with a finger. Pt c/o generalized weakness

## 2023-05-25 NOTE — ED TRIAGE NOTES
Pt c/o diarrhea and congestion since 3am this morning, lightheaded and chest pain at noon after seeing PCP.  currently extremely weak. Pt on chemo for metastatic bone cancer last chemo Friday

## 2023-05-25 NOTE — ED PROVIDER NOTES
Encounter Date: 5/25/2023       History     Chief Complaint   Patient presents with    Diarrhea     Pt c/o diarrhea, lightheaded and chest pain.  Pt on chemo for metastatic bone cancer.      47-year-old male with history of DM, HTN, metastatic breast cancer on chemo presents for evaluation diarrhea and lightheadedness.  Patient's last chemo session was 6 days ago, a few days ago he started to feel sick with mild nasal congestion and cough, but this improved yesterday.  However this morning he had these recurrent symptoms as well as severe diarrhea with multiple episodes of loose black stools.  He also reports some nausea but no emesis.  He had routine outpatient labs done here today but when walking back to his car started to feel lightheaded like he may pass out so was advised to come to the ED. patient denies any associated abdominal pain, fevers, or urinary symptoms.  He also endorses chest tightness and SOB started today, denies any associated wheezing or leg swelling.  Also endorses a generalized head pressure but no sore throat or neck pain.  He does report that his grandchild and wife are both being seen currently in the ED for fevers    Review of patient's allergies indicates:  No Known Allergies  Past Medical History:   Diagnosis Date    Breast cancer     Cancer     R breast    Diabetes mellitus     HTN (hypertension)     Leg edema, right     Prediabetes     Seizures     at age 18 - stress related    Therapy     marital counseling     Past Surgical History:   Procedure Laterality Date    AXILLARY NODE DISSECTION Right 11/22/2019    Procedure: LYMPHADENECTOMY, AXILLARY RIGHT;  Surgeon: Shima Whyte MD;  Location: [facility];  Service: General;  Laterality: Right;    AXILLARY NODE DISSECTION Right 12/17/2019    Procedure: LYMPHADENECTOMY, AXILLARY;  Surgeon: Shima Whyte MD;  Location: [address];  Service: General;  Laterality: Right;    BREAST BIOPSY      EXCISION OF HYDROCELE Right 10/28/2022     Procedure: HYDROCELECTOMY;  Surgeon: Anthony Cordero Jr., MD;  Location: [address];  Service: Urology;  Laterality: Right;  1 hour    INSERTION OF TUNNELED CENTRAL VENOUS CATHETER (CVC) WITH SUBCUTANEOUS PORT Left 5/24/2019    Procedure: [id number];  Surgeon: Shima Whyte MD;  Location: [facility] OR [address] [place] [address];  Service: General;  Laterality: Left;    INSERTION OF TUNNELED CENTRAL VENOUS CATHETER (CVC) WITH SUBCUTANEOUS PORT Left 9/17/2021    Procedure: INSERTION, SINGLE LUMEN CATHETER WITH PORT, WITH FLUOROSCOPIC GUIDANCE, right;  Surgeon: Gloria Holliday MD;  Location: [facility] OR [address] [place] [address];  Service: General;  Laterality: Left;  rapid covid test    SENTINEL LYMPH NODE BIOPSY Right 11/22/2019    Procedure: BIOPSY, LYMPH NODE, SENTINEL RIGHT;  Surgeon: Shima Whyte MD;  Location: [facility];  Service: General;  Laterality: Right;    SIMPLE MASTECTOMY Right 11/22/2019    Procedure: MASTECTOMY, SIMPLE RIGHT (CONSENT AM OF) 2.5 hr case;  Surgeon: Shima Whyte MD;  Location: [facility];  Service: General;  Laterality: Right;     Family History   Problem Relation Age of Onset    Hypertension Mother     Diabetes Mother     Hypertension Father     Lupus Father     Post-traumatic stress disorder Brother     No Known Problems Maternal Grandmother     Colon cancer Maternal Grandfather     Breast cancer Paternal Grandmother     No Known Problems Paternal Grandfather     No Known Problems Sister     No Known Problems Maternal Aunt     No Known Problems Maternal Uncle     Fibromyalgia Paternal Aunt     Lupus Paternal Aunt     No Known Problems Paternal Uncle     No Known Problems Brother     No Known Problems Sister     No Known Problems Son     No Known Problems Son     No Known Problems Son     No Known Problems Son      Social History     Tobacco Use    Smoking status: Former     Packs/day: 0.25     Years: 15.00     Pack years: 3.75     Types: Cigarettes, Vaping with nicotine     Quit date: 11/23/2014     Years since  quittin.5    Smokeless tobacco: Never    Tobacco comments:     Social smoker with alcohol    Substance Use Topics    Alcohol use: Yes     Alcohol/week: 0.0 standard drinks     Comment: Rarely    Drug use: Not Currently     Types: Marijuana     Review of Systems   Constitutional:  Negative for fever.   HENT:  Positive for congestion.    Eyes:  Negative for redness.   Respiratory:  Positive for cough, chest tightness and shortness of breath.    Gastrointestinal:  Positive for diarrhea and nausea. Negative for abdominal pain.   Genitourinary:  Negative for dysuria.   Skin:  Negative for rash.   Neurological:  Positive for headaches.   Psychiatric/Behavioral:  Negative for confusion.      Physical Exam     Initial Vitals [23 1327]   BP Pulse Resp Temp SpO2   (!) 125/58 74 20 98.5 °F (36.9 °C) 97 %      MAP       --         Physical Exam    Nursing note and vitals reviewed.  Constitutional: He is not diaphoretic. No distress.   HENT:   Head: Normocephalic and atraumatic.   Dry mucous membranes   Eyes: Conjunctivae are normal. No scleral icterus.   Neck: Neck supple.   Cardiovascular:  Normal rate, regular rhythm, normal heart sounds and intact distal pulses.           No murmur heard.  Pulmonary/Chest: Breath sounds normal. No respiratory distress. He has no wheezes. He has no rhonchi. He has no rales. He exhibits no tenderness.   Abdominal: Abdomen is soft. There is no abdominal tenderness. There is no rebound and no guarding.   Musculoskeletal:         General: No edema.      Cervical back: Neck supple.     Neurological: He is alert and oriented to person, place, and time.   Skin: Skin is warm and dry.   Psychiatric: He has a normal mood and affect.       ED Course   Procedures  Labs Reviewed   CBC W/ AUTO DIFFERENTIAL - Abnormal; Notable for the following components:       Result Value    WBC 2.05 (*)     RBC 4.11 (*)     Hemoglobin 9.6 (*)     Hematocrit 31.5 (*)     MCV 77 (*)     MCH 23.4 (*)     MCHC  30.5 (*)     RDW 17.2 (*)     Immature Granulocytes 2.0 (*)     Gran # (ANC) 0.7 (*)     Mono # 0.1 (*)     Gran % 36.0 (*)     Lymph % 53.7 (*)     All other components within normal limits   BASIC METABOLIC PANEL - Abnormal; Notable for the following components:    Calcium 8.3 (*)     All other components within normal limits   INFLUENZA A & B BY MOLECULAR   CULTURE, STOOL   CLOSTRIDIUM DIFFICILE   HIV 1 / 2 ANTIBODY    Narrative:     Release to patient->Immediate   HEPATITIS C ANTIBODY    Narrative:     Release to patient->Immediate   MAGNESIUM   TROPONIN I   SARS-COV-2 RNA AMPLIFICATION, QUAL   WBC, STOOL   GIARDIA/CRYPTOSPORIDIUM (EIA)   STOOL EXAM-OVA,CYSTS,PARASITES   URINALYSIS, REFLEX TO URINE CULTURE   POCT GLUCOSE MONITORING CONTINUOUS          Imaging Results              X-Ray Chest AP Portable (Final result)  Result time 05/25/23 15:51:26      Final result by Abel Oleary MD (05/25/23 15:51:26)                   Impression:      1. Coarse interstitial attenuation likely accentuated by habitus, nonspecific pneumonitis could present in this fashion although correlation is needed.      Electronically signed by: Abel Oleary MD  Date:    05/25/2023  Time:    15:51               Narrative:    EXAMINATION:  XR CHEST AP PORTABLE    CLINICAL HISTORY:  Cough, unspecified    TECHNIQUE:  Single frontal view of the chest was performed.    COMPARISON:  01/09/2023    FINDINGS:  The cardiomediastinal silhouette is not enlarged, stable.  Left chest wall port catheter tip projects over the distal SVC..  There is no pleural effusion.  The trachea is midline.  The lungs are symmetrically expanded bilaterally with coarse interstitial attenuation, accentuated by habitus..  No large focal consolidation seen.  There is no pneumothorax.  The osseous structures are unremarkable..                                       Medications   sodium chloride 0.9% flush 10 mL (has no administration in time range)   naloxone 0.4  mg/mL injection 0.02 mg (has no administration in time range)   dextrose 40 % gel 15,000 mg (has no administration in time range)   dextrose 40 % gel 30,000 mg (has no administration in time range)   dextrose 10% bolus 125 mL 125 mL (has no administration in time range)   dextrose 10% bolus 250 mL 250 mL (has no administration in time range)   glucagon (human recombinant) injection 1 mg (has no administration in time range)   enoxaparin injection 40 mg (has no administration in time range)   acetaminophen tablet 650 mg (has no administration in time range)   ondansetron injection 4 mg (has no administration in time range)   prochlorperazine injection Soln 5 mg (has no administration in time range)   insulin aspart U-100 pen 0-5 Units (has no administration in time range)   melatonin tablet 6 mg (has no administration in time range)   amLODIPine tablet 10 mg (has no administration in time range)   diphenoxylate-atropine 2.5-0.025 mg per tablet 1 tablet (has no administration in time range)   sodium chloride 0.9% bolus 1,000 mL 1,000 mL (0 mLs Intravenous Stopped 5/25/23 1633)     Medical Decision Making:   Initial Assessment:       47-year-old male with history of DM, HTN, metastatic breast cancer on chemo presents for evaluation diarrhea and lightheadedness.  His last chemo was 6 days ago, was doing well until he started feel sick with nasal congestion and cough 2 days ago, which resolved yesterday but recurred this morning along with numerous episodes of diarrhea with dark black stool, and nausea as well.  He felt weak and near syncopal when coming here for outpatient labs so came to ED per oncology recommendations.  He also reports a chest tightness and SOB that started this morning, but no fevers or wheezing.  No known cardiac history.  He denies any similar loose stools with previous chemo sessions.  He reports multiple family members as sick contacts.  On arrival patient afebrile and well-appearing, with signs  of mild dehydration but normal vitals, no fever.  He has normal O2 sat on room air, no abnormal lung exam, no wheezing or other clear etiology for his chest tightness and SOB.  Lower suspicion for ACS, no clinical sign of DVT or hypoxia to suggest PE at this time.  I suspect he may be having a viral syndrome such as COVID or influenza especially given sick contacts, also evaluate for pneumonia.  His diarrhea could be side effect of chemo, versus part of viral illness or bacterial enteritis.  Will get stool studies as well and also test for C diff.  Basic labs from earlier today reviewed with low WBC but differential is pending to evaluate absolute neutrophil count.  No significant CMP abnormalities such as LEBRON or hypokalemia related to diarrhea and dehydration.      Repeat labs confirm neutropenia with .  Hemoglobin is slightly lower at 9.6, will need serial H&H to ensure black stools are not sign of GI bleed.  Rapid COVID and influenza test negative.  No definite pneumonia on chest x-ray, and troponin negative.  Patient admitted to oncology service given neutropenia and infectious picture, will defer antibiotics to them since viral source still more likely though he is immunocompromised.  Awaiting patient to provide sample for stool studies.                        Clinical Impression:   Final diagnoses:  [R07.9] Chest pain  [R05.9] Cough        ED Disposition Condition    Admit                 Luis Beck MD  05/25/23 2017

## 2023-05-25 NOTE — HPI
47M  with hx  Metastatic triple negative breast cancer with BRCA1 mutation (on sacituzumab-govetican Q3w cycle 4, last 5/17), T2DM, HTN, presenting with diarrhea for last couple days, weakness and neutropenia. She was advised per Dr. Linn to visit the ED. In addition to persistent diarrhea, several black stools noted. He denies any fevers, chills, chest pain, palpitations, or dysuria. He reports his grandchild and wife both have fevers.     ED workup notable for wbc (2.30), Hgb 10.3, MCV 76, ANC 1.1, Alk P 187, and CXR with coarse interstitial attenuation with nonspecific pneumonitis. Medical oncology will admit for further infectious workup.

## 2023-05-25 NOTE — ASSESSMENT & PLAN NOTE
47M with hx  Metastatic triple negative breast cancer with BRCA1 mutation (on sacituzumab-govetican Q3w cycle 4, last 5/17). Following Dr. Linn.

## 2023-05-26 VITALS
SYSTOLIC BLOOD PRESSURE: 139 MMHG | RESPIRATION RATE: 17 BRPM | BODY MASS INDEX: 41.75 KG/M2 | HEART RATE: 75 BPM | TEMPERATURE: 98 F | WEIGHT: 315 LBS | OXYGEN SATURATION: 94 % | HEIGHT: 73 IN | DIASTOLIC BLOOD PRESSURE: 63 MMHG

## 2023-05-26 LAB
ALBUMIN SERPL BCP-MCNC: 2.9 G/DL (ref 3.5–5.2)
ALP SERPL-CCNC: 180 U/L (ref 55–135)
ALT SERPL W/O P-5'-P-CCNC: 43 U/L (ref 10–44)
ANION GAP SERPL CALC-SCNC: 9 MMOL/L (ref 8–16)
AST SERPL-CCNC: 18 U/L (ref 10–40)
BASOPHILS NFR BLD: 0 % (ref 0–1.9)
BILIRUB SERPL-MCNC: 0.5 MG/DL (ref 0.1–1)
BILIRUB UR QL STRIP: NEGATIVE
BUN SERPL-MCNC: 15 MG/DL (ref 6–20)
CALCIUM SERPL-MCNC: 8.4 MG/DL (ref 8.7–10.5)
CHLORIDE SERPL-SCNC: 105 MMOL/L (ref 95–110)
CLARITY UR REFRACT.AUTO: CLEAR
CO2 SERPL-SCNC: 20 MMOL/L (ref 23–29)
COLOR UR AUTO: YELLOW
CREAT SERPL-MCNC: 1 MG/DL (ref 0.5–1.4)
DIFFERENTIAL METHOD: ABNORMAL
EOSINOPHIL NFR BLD: 3 % (ref 0–8)
ERYTHROCYTE [DISTWIDTH] IN BLOOD BY AUTOMATED COUNT: 17.2 % (ref 11.5–14.5)
EST. GFR  (NO RACE VARIABLE): >60 ML/MIN/1.73 M^2
GLUCOSE SERPL-MCNC: 132 MG/DL (ref 70–110)
GLUCOSE UR QL STRIP: NEGATIVE
HCT VFR BLD AUTO: 32.2 % (ref 40–54)
HGB BLD-MCNC: 10 G/DL (ref 14–18)
HGB UR QL STRIP: NEGATIVE
IMM GRANULOCYTES # BLD AUTO: ABNORMAL K/UL (ref 0–0.04)
IMM GRANULOCYTES NFR BLD AUTO: ABNORMAL % (ref 0–0.5)
KETONES UR QL STRIP: NEGATIVE
LEUKOCYTE ESTERASE UR QL STRIP: NEGATIVE
LYMPHOCYTES NFR BLD: 55 % (ref 18–48)
MAGNESIUM SERPL-MCNC: 1.8 MG/DL (ref 1.6–2.6)
MCH RBC QN AUTO: 23.3 PG (ref 27–31)
MCHC RBC AUTO-ENTMCNC: 31.1 G/DL (ref 32–36)
MCV RBC AUTO: 75 FL (ref 82–98)
METAMYELOCYTES NFR BLD MANUAL: 1 %
MONOCYTES NFR BLD: 11 % (ref 4–15)
MYELOCYTES NFR BLD MANUAL: 2 %
NEUTROPHILS NFR BLD: 28 % (ref 38–73)
NITRITE UR QL STRIP: NEGATIVE
NRBC BLD-RTO: 0 /100 WBC
PH UR STRIP: 5 [PH] (ref 5–8)
PHOSPHATE SERPL-MCNC: 3.2 MG/DL (ref 2.7–4.5)
PLATELET # BLD AUTO: 249 K/UL (ref 150–450)
PLATELET BLD QL SMEAR: ABNORMAL
PMV BLD AUTO: 9.5 FL (ref 9.2–12.9)
POCT GLUCOSE: 199 MG/DL (ref 70–110)
POTASSIUM SERPL-SCNC: 4.5 MMOL/L (ref 3.5–5.1)
PROT SERPL-MCNC: 7.4 G/DL (ref 6–8.4)
PROT UR QL STRIP: NEGATIVE
RBC # BLD AUTO: 4.29 M/UL (ref 4.6–6.2)
SODIUM SERPL-SCNC: 134 MMOL/L (ref 136–145)
SP GR UR STRIP: 1.01 (ref 1–1.03)
URN SPEC COLLECT METH UR: NORMAL
WBC # BLD AUTO: 1.51 K/UL (ref 3.9–12.7)
WBC OTHER NFR BLD MANUAL: 0 %

## 2023-05-26 PROCEDURE — 36415 COLL VENOUS BLD VENIPUNCTURE: CPT

## 2023-05-26 PROCEDURE — 99239 HOSP IP/OBS DSCHRG MGMT >30: CPT | Mod: GC,,, | Performed by: HOSPITALIST

## 2023-05-26 PROCEDURE — 25000003 PHARM REV CODE 250

## 2023-05-26 PROCEDURE — 84100 ASSAY OF PHOSPHORUS: CPT

## 2023-05-26 PROCEDURE — 63600175 PHARM REV CODE 636 W HCPCS

## 2023-05-26 PROCEDURE — 85027 COMPLETE CBC AUTOMATED: CPT

## 2023-05-26 PROCEDURE — 80053 COMPREHEN METABOLIC PANEL: CPT

## 2023-05-26 PROCEDURE — G0378 HOSPITAL OBSERVATION PER HR: HCPCS

## 2023-05-26 PROCEDURE — 81003 URINALYSIS AUTO W/O SCOPE: CPT

## 2023-05-26 PROCEDURE — 99239 PR HOSPITAL DISCHARGE DAY,>30 MIN: ICD-10-PCS | Mod: GC,,, | Performed by: HOSPITALIST

## 2023-05-26 PROCEDURE — 83735 ASSAY OF MAGNESIUM: CPT

## 2023-05-26 PROCEDURE — 85007 BL SMEAR W/DIFF WBC COUNT: CPT | Mod: NCS

## 2023-05-26 RX ADMIN — AMLODIPINE BESYLATE 10 MG: 10 TABLET ORAL at 08:05

## 2023-05-26 RX ADMIN — GABAPENTIN 900 MG: 300 CAPSULE ORAL at 08:05

## 2023-05-26 RX ADMIN — ENOXAPARIN SODIUM 40 MG: 40 INJECTION SUBCUTANEOUS at 08:05

## 2023-05-26 NOTE — DISCHARGE SUMMARY
[facility] - Oncology ([facility])  Hematology/Oncology  Discharge Summary      Patient Name: Paul Li Jr.  MRN: [id number]  Admission Date: 5/25/2023  Hospital Length of Stay: 1 days  Discharge Date and Time:  05/26/2023 8:42 AM  Attending Physician: Luis F Sin MD   Discharging Provider: Ivan Kebede MD  Primary Care Provider: Kareem Arroyo MD    HPI: 47M  with hx  Metastatic triple negative breast cancer with BRCA1 mutation (on sacituzumab-govetican Q3w cycle 4, last 5/17), T2DM, HTN, presenting with diarrhea for last couple days, weakness and neutropenia. She was advised per Dr. Linn to visit the ED. In addition to persistent diarrhea, several black stools noted. He denies any fevers, chills, chest pain, palpitations, or dysuria. He reports his grandchild and wife both have fevers.     ED workup notable for wbc (2.30), Hgb 10.3, MCV 76, ANC 1.1, Alk P 187, and CXR with coarse interstitial attenuation with nonspecific pneumonitis. Medical oncology will admit for further infectious workup.          * No surgery found *     Hospital Course: Patient admitted to medical oncology for generalized malaise, nausea, and diarrhea. Infectious work up shows rhinovirus; likely from sick contacts at home. On 5/26 patient feels much improved. No further diarrhea. Tolerating solid PO diet without nausea or vomiting. He feels well and would like to go home. He is scheduled for follow up on  6/8.        Goals of Care Treatment Preferences:  Code Status: Full Code    Health care agent: Efraín Li  [facility] agent number: [phone]          What is most important right now is to focus on symptom/pain control.  Accordingly, we have decided that the best plan to meet the patient's goals includes continuing with treatment.      Consults:     Significant Diagnostic Studies: Labs:   CMP   Recent Labs   Lab 05/25/23  1235 05/25/23  1512 05/26/23  0512    138 134*   K 4.3 4.1 4.5    106 105   CO2  "19* 23 20*   * 94 132*   BUN 18 20 15   CREATININE 1.2 1.2 1.0   CALCIUM 8.5* 8.3* 8.4*   PROT 7.7  --  7.4   ALBUMIN 3.2*  --  2.9*   BILITOT 0.8  --  0.5   ALKPHOS 187*  --  180*   AST 23  --  18   ALT 52*  --  43   ANIONGAP 12 9 9    and CBC   Recent Labs   Lab 05/25/23  1235 05/25/23  1512 05/26/23  0512   WBC 2.30* 2.05* 1.51*   HGB 10.3* 9.6* 10.0*   HCT 33.2* 31.5* 32.2*    232 249       Pending Diagnostic Studies:     None        Final Active Diagnoses:    Diagnosis Date Noted POA    PRINCIPAL PROBLEM:  Chemotherapy-induced neutropenia [D70.1, T45.1X5A] 06/16/2020 Yes    T2DM (type 2 diabetes mellitus) [E11.9] 01/07/2020 Unknown    Malignant neoplasm involving both nipple and areola of right breast in male, estrogen receptor negative [C50.021, Z17.1] 05/17/2019 Not Applicable    Essential hypertension [I10] 11/14/2017 Yes      Problems Resolved During this Admission:      Discharged Condition: stable    Disposition: Home or Self Care    Follow Up:    Patient Instructions:   No discharge procedures on file.  Medications:  Reconciled Home Medications:      Medication List      CONTINUE taking these medications    ALPRAZolam 0.5 MG tablet  Commonly known as: XANAX  Take 1 tablet (0.5 mg total) by mouth 3 (three) times daily as needed for Insomnia or Anxiety.     amLODIPine 10 MG tablet  Commonly known as: NORVASC  TAKE 1 TABLET (10 MG) BY MOUTH EVERY DAY     BD ULTRA-FINE TANESHA PEN NEEDLE 32 gauge x 5/32" Ndle  Generic drug: pen needle, diabetic  Use as directed with novolog and levemir (4 times daily)     dexAMETHasone 4 MG Tab  Commonly known as: DECADRON  Take 2 tablets (8 mg total) by mouth once daily. Take 2 tablets (8 mg total) by mouth once daily. Take a directed on days 2, 3 and 4 of your chemotherapy cycle.     diazePAM 5 MG tablet  Commonly known as: VALIUM  Take 1 tablet (5 mg total) by mouth every 12 (twelve) hours as needed (muscle spasm).     diphenoxylate-atropine 2.5-0.025 mg " 2.5-0.025 mg per tablet  Commonly known as: LOMOTIL  Take 1 tablet by mouth 4 (four) times daily as needed for Diarrhea.     FLUoxetine 40 MG capsule  Take 2 capsules (80 mg total) by mouth once daily.     gabapentin 300 MG capsule  Commonly known as: NEURONTIN  Take 3 capsules (900 mg total) by mouth 3 (three) times daily.     HumaLOG KwikPen Insulin 100 unit/mL pen  Generic drug: insulin lispro  Inject 5 Units into the skin 3 (three) times daily.     hydroCHLOROthiazide 25 MG tablet  Commonly known as: HYDRODIURIL  TAKE 1 TABLET BY MOUTH ONCE DAILY     HYDROcodone-acetaminophen  mg per tablet  Commonly known as: NORCO  Take 1 tablet by mouth every 6 (six) hours as needed for Pain.     ibuprofen 600 MG tablet  Commonly known as: ADVIL,MOTRIN  Take 1 tablet (600 mg total) by mouth every 8 (eight) hours as needed for Pain.     lancing device Misc  1 Device by Misc.(Non-Drug; Combo Route) route 2 (two) times daily with meals.     LEVEMIR FLEXPEN 100 unit/mL (3 mL) Inpn pen  Generic drug: insulin detemir U-100 (Levemir)  Inject 21 Units into the skin every evening.     * LIDOcaine 5 %  Commonly known as: LIDODERM  Place 1 patch onto the skin once daily. Remove & Discard patch within 12 hours or as directed by MD     * LIDOcaine 5 %  Commonly known as: LIDODERM  Place 1 patch onto the skin once daily. Remove & Discard patch within 12 hours or as directed by MD     losartan 50 MG tablet  Commonly known as: COZAAR  Take 2 tablets by mouth once daily     metFORMIN 500 MG tablet  Commonly known as: GLUCOPHAGE  Take 2 tablets (1,000 mg total) by mouth 2 (two) times daily with meals. Needs appointment for future refills     OLANZapine 5 MG tablet  Commonly known as: ZyPREXA  Take 1 tablet (5 mg total) by mouth every evening. days 1-4 of each chemotherapy cycle.     ondansetron 8 MG Tbdl  Commonly known as: ZOFRAN-ODT  Dissolve 1 tablet (8 mg total) by mouth every 8 (eight) hours as needed (nausea/vomiting).     ONETOUCH  DELICA PLUS LANCET 33 gauge Misc  Generic drug: lancets  1 lancet by Misc.(Non-Drug; Combo Route) route once daily.     OYSTER SHELL CALCIUM-VIT D3 500 mg-5 mcg (200 unit) per tablet  Generic drug: calcium-vitamin D3  Take 1 tablet by mouth 2 (two) times daily.     tadalafiL 5 MG tablet  Commonly known as: CIALIS  Take 2 tablets (10 mg total) by mouth daily as needed for Erectile Dysfunction.     traZODone 100 MG tablet  Commonly known as: DESYREL  Take 1 tablet (100 mg total) by mouth every evening.     TRULICITY 1.5 mg/0.5 mL pen injector  Generic drug: dulaglutide  Inject 1.5 mg into the skin once a week.         * This list has 2 medication(s) that are the same as other medications prescribed for you. Read the directions carefully, and ask your doctor or other care provider to review them with you.                Ivan Kebede MD  Hematology/Oncology  [facility] - Oncology ([facility])

## 2023-05-26 NOTE — SUBJECTIVE & OBJECTIVE
Interval History: No acute events overnight. Vitals and labs stable. Ptienthas had no further diarrhea, nausea, or vomiting. Tolerating PO intake.     Oncology Treatment Plan:   OP SACITUZUMAB GOVITECAN-HZIY Q3W    Medications:  Continuous Infusions:  Scheduled Meds:   amLODIPine  10 mg Oral Daily    enoxparin  40 mg Subcutaneous Q12H    gabapentin  900 mg Oral TID     PRN Meds:acetaminophen, dextrose 10%, dextrose 10%, dextrose, dextrose, diphenoxylate-atropine 2.5-0.025 mg, glucagon (human recombinant), insulin aspart U-100, melatonin, naloxone, ondansetron, oxymetazoline, prochlorperazine, sodium chloride 0.9%, traZODone       Objective:     Vital Signs (Most Recent):  Temp: 98.1 °F (36.7 °C) (05/26/23 0718)  Pulse: 77 (05/26/23 0718)  Resp: 17 (05/26/23 0718)  BP: 139/63 (05/26/23 0718)  SpO2: (!) 94 % (05/26/23 0718) Vital Signs (24h Range):  Temp:  [97.8 °F (36.6 °C)-99.5 °F (37.5 °C)] 98.1 °F (36.7 °C)  Pulse:  [67-77] 77  Resp:  [11-20] 17  SpO2:  [92 %-100 %] 94 %  BP: (117-139)/(53-65) 139/63     Weight: (!) 153.8 kg (339 lb)  Body mass index is 44.73 kg/m².  Body surface area is 2.81 meters squared.      Intake/Output Summary (Last 24 hours) at 5/26/2023 0836  Last data filed at 5/26/2023 0831  Gross per 24 hour   Intake 400 ml   Output 1850 ml   Net -1450 ml        Physical Exam  Vitals and nursing note reviewed.   Constitutional:       General: He is not in acute distress.  HENT:      Head: Normocephalic and atraumatic.      Nose: Nose normal. No rhinorrhea.   Eyes:      Extraocular Movements: Extraocular movements intact.      Conjunctiva/sclera: Conjunctivae normal.   Cardiovascular:      Rate and Rhythm: Normal rate and regular rhythm.   Pulmonary:      Effort: Pulmonary effort is normal. No respiratory distress.      Breath sounds: Rales present.   Abdominal:      General: There is no distension.      Palpations: Abdomen is soft.   Musculoskeletal:      Right lower leg: No edema.      Left lower  leg: No edema.   Skin:     General: Skin is warm and dry.      Capillary Refill: Capillary refill takes less than 2 seconds.   Neurological:      General: No focal deficit present.      Mental Status: He is alert and oriented to person, place, and time.        Significant Labs:   All pertinent labs from the last 24 hours have been reviewed.    Diagnostic Results:  I have reviewed all pertinent imaging results/findings within the past 24 hours.

## 2023-05-26 NOTE — HOSPITAL COURSE
Patient admitted to medical oncology for generalized malaise, nausea, and diarrhea. Infectious work up shows rhinovirus; likely from sick contacts at home. On 5/26 patient feels much improved. No further diarrhea. Tolerating solid PO diet without nausea or vomiting. He feels well and would like to go home. He is scheduled for follow up on  6/8.

## 2023-05-26 NOTE — PLAN OF CARE
Pt discharged to home per MD order. Discharge instructions given and explained to pt. Pt verbalized understanding of d/c instructions. No prescriptions needed. PIV removed; site clean, dry, and intact.  Pt ambulating in room with steady gait; tolerating regular diet; voiding without difficulty; pain well-controlled with PO pain meds.  All VSS; O2 sats WNL. Pt left floor by wheelchair via hospital transportation; accompanied by nurse from the floor.

## 2023-05-26 NOTE — PROGRESS NOTES
[facility] - Oncology ([facility])  Hematology/Oncology  Progress Note    Patient Name: Paul Li Jr.  Admission Date: 5/25/2023  Hospital Length of Stay: 1 days  Code Status: Full Code     Subjective:     HPI:  47M  with hx  Metastatic triple negative breast cancer with BRCA1 mutation (on sacituzumab-govetican Q3w cycle 4, last 5/17), T2DM, HTN, presenting with diarrhea for last couple days, weakness and neutropenia. She was advised per Dr. Linn to visit the ED. In addition to persistent diarrhea, several black stools noted. He denies any fevers, chills, chest pain, palpitations, or dysuria. He reports his grandchild and wife both have fevers.     ED workup notable for wbc (2.30), Hgb 10.3, MCV 76, ANC 1.1, Alk P 187, and CXR with coarse interstitial attenuation with nonspecific pneumonitis. Medical oncology will admit for further infectious workup.          Interval History: No acute events overnight. Vitals and labs stable. Ptienthas had no further diarrhea, nausea, or vomiting. Tolerating PO intake.     Oncology Treatment Plan:   OP SACITUZUMAB GOVITECAN-HZIY Q3W    Medications:  Continuous Infusions:  Scheduled Meds:   amLODIPine  10 mg Oral Daily    enoxparin  40 mg Subcutaneous Q12H    gabapentin  900 mg Oral TID     PRN Meds:acetaminophen, dextrose 10%, dextrose 10%, dextrose, dextrose, diphenoxylate-atropine 2.5-0.025 mg, glucagon (human recombinant), insulin aspart U-100, melatonin, naloxone, ondansetron, oxymetazoline, prochlorperazine, sodium chloride 0.9%, traZODone       Objective:     Vital Signs (Most Recent):  Temp: 98.1 °F (36.7 °C) (05/26/23 0718)  Pulse: 77 (05/26/23 0718)  Resp: 17 (05/26/23 0718)  BP: 139/63 (05/26/23 0718)  SpO2: (!) 94 % (05/26/23 0718) Vital Signs (24h Range):  Temp:  [97.8 °F (36.6 °C)-99.5 °F (37.5 °C)] 98.1 °F (36.7 °C)  Pulse:  [67-77] 77  Resp:  [11-20] 17  SpO2:  [92 %-100 %] 94 %  BP: (117-139)/(53-65) 139/63     Weight: (!) 153.8 kg (339 lb)  Body mass index is  44.73 kg/m².  Body surface area is 2.81 meters squared.      Intake/Output Summary (Last 24 hours) at 5/26/2023 0836  Last data filed at 5/26/2023 0831  Gross per 24 hour   Intake 400 ml   Output 1850 ml   Net -1450 ml        Physical Exam  Vitals and nursing note reviewed.   Constitutional:       General: He is not in acute distress.  HENT:      Head: Normocephalic and atraumatic.      Nose: Nose normal. No rhinorrhea.   Eyes:      Extraocular Movements: Extraocular movements intact.      Conjunctiva/sclera: Conjunctivae normal.   Cardiovascular:      Rate and Rhythm: Normal rate and regular rhythm.   Pulmonary:      Effort: Pulmonary effort is normal. No respiratory distress.      Breath sounds: Rales present.   Abdominal:      General: There is no distension.      Palpations: Abdomen is soft.   Musculoskeletal:      Right lower leg: No edema.      Left lower leg: No edema.   Skin:     General: Skin is warm and dry.      Capillary Refill: Capillary refill takes less than 2 seconds.   Neurological:      General: No focal deficit present.      Mental Status: He is alert and oriented to person, place, and time.        Significant Labs:   All pertinent labs from the last 24 hours have been reviewed.    Diagnostic Results:  I have reviewed all pertinent imaging results/findings within the past 24 hours.    Assessment/Plan:     * Chemotherapy-induced neutropenia  47M with hx  Metastatic triple negative breast cancer with BRCA1 mutation here with diarrhea, weakness and neutropenia. He was advised per Dr. Linn to visit the ED. WBC 2.30, ANC 1.1, CXR with non specific pneumonitis. The patient is afebrile. Not suspecting infectious process at this time, though we will obtain infectious workup.     - f/u stool studies, no further diarrhea, so nothing collected   - Order blood cultures; NGTD   - respiratory infection panel: positive for rhinovirus   - f/u UA: clear  - prn antiemetics  - prn pain medications   -  diphenoxylate prn    T2DM (type 2 diabetes mellitus)  LDSSI prn, maintain BG between 140-180     Malignant neoplasm involving both nipple and areola of right breast in male, estrogen receptor negative  47M with hx  Metastatic triple negative breast cancer with BRCA1 mutation (on sacituzumab-govetican Q3w cycle 4, last 5/17). Following Dr. Linn.     Essential hypertension  Continue amlodipine             Ivan Kebede MD  Hematology/Oncology  [facility] - Oncology ([facility])

## 2023-05-26 NOTE — PLAN OF CARE
Patient is progressing and involved with plan of care, communicating needs throughout shift. Up with stand by assist. Tolerating diet, voiding without difficulty. No BM this shift. Stool sample pending. Pt c/o sinus headache. Afrin given with much relief. Blood glucose below 200. All vitals stable; no acute events this shift. Pt. Remaining free from falls or injury throughout shift; bed in lowest position; side rails up X2; call light within reach; pt instructed to call for assistance as needed - verbalized understanding. Q2h rounding on patient. Will continue to monitor.

## 2023-05-26 NOTE — ASSESSMENT & PLAN NOTE
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 He was advised per Dr. Linn to visit the ED. WBC 2.30, ANC 1.1, CXR with non specific pneumonitis. The patient is afebrile. Not suspecting infectious process at this time, though we will obtain infectious workup.     - f/u stool studies, no further diarrhea, so nothing collected   - Order blood cultures; NGTD   - respiratory infection panel: positive for rhinovirus   - f/u UA: clear  - prn antiemetics  - prn pain medications   - diphenoxylate prn

## 2023-05-30 ENCOUNTER — PES CALL (OUTPATIENT)
Dept: ADMINISTRATIVE | Facility: CLINIC | Age: 48
End: 2023-05-30
Payer: MEDICARE

## 2023-05-30 LAB
BACTERIA BLD CULT: NORMAL
BACTERIA BLD CULT: NORMAL

## 2023-05-31 DIAGNOSIS — R93.89 ABNORMAL CXR: Primary | ICD-10-CM

## 2023-06-01 ENCOUNTER — HOSPITAL ENCOUNTER (OUTPATIENT)
Dept: RADIOLOGY | Facility: HOSPITAL | Age: 48
Discharge: HOME | End: 2023-06-01
Attending: INTERNAL MEDICINE
Payer: MEDICARE

## 2023-06-01 ENCOUNTER — PATIENT MESSAGE (OUTPATIENT)
Dept: HEMATOLOGY/ONCOLOGY | Facility: CLINIC | Age: 48
End: 2023-06-01

## 2023-06-01 DIAGNOSIS — R93.89 ABNORMAL CXR: ICD-10-CM

## 2023-06-01 PROCEDURE — 71046 X-RAY EXAM CHEST 2 VIEWS: CPT | Mod: TC

## 2023-06-01 PROCEDURE — 71046 X-RAY EXAM CHEST 2 VIEWS: CPT | Mod: 26,,, | Performed by: RADIOLOGY

## 2023-06-01 PROCEDURE — 71046 XR CHEST PA AND LATERAL: ICD-10-PCS | Mod: 26,,, | Performed by: RADIOLOGY

## 2023-06-02 ENCOUNTER — PATIENT OUTREACH (OUTPATIENT)
Dept: ADMINISTRATIVE | Facility: HOSPITAL | Age: 48
End: 2023-06-02
Payer: MEDICARE

## 2023-06-02 NOTE — PROGRESS NOTES
Health Maintenance Due   Topic Date Due    Pneumococcal Vaccines (Age 0-64) (1 - PCV) Never done    Low Dose Statin  Never done    Foot Exam  11/06/2020    Diabetes Urine Screening  01/02/2021    COVID-19 Vaccine (2 - Booster for Karely series) 09/27/2021    Colorectal Cancer Screening  Never done    Lipid Panel  05/26/2023     Chart reviewed.   Immunizations: Reconciled  Orders placed: N/A  Upcoming appts to satisfy KALEIGH topics: N/A

## 2023-06-04 DIAGNOSIS — G89.3 NEOPLASM RELATED PAIN: ICD-10-CM

## 2023-06-04 DIAGNOSIS — I10 HYPERTENSION, UNSPECIFIED TYPE: ICD-10-CM

## 2023-06-04 NOTE — TELEPHONE ENCOUNTER
Care Due:                  Date            Visit Type   Department     Provider  --------------------------------------------------------------------------------    Last Visit: None Found      None         None Found  Next Visit: None Scheduled  None         None Found                                                            Last  Test          Frequency    Reason                     Performed    Due Date  --------------------------------------------------------------------------------    Office Visit  12 months..  dulaglutide, insulin.....  Not Found    Overdue    HBA1C.......  6 months...  dulaglutide, insulin.....  12- 06-    [facility] Embedded Care Due Messages. Reference number: [id number].   6/04/2023 12:31:38 AM CDT

## 2023-06-05 RX ORDER — AMLODIPINE BESYLATE 10 MG/1
TABLET ORAL
Qty: 90 TABLET | Refills: 3 | Status: SHIPPED | OUTPATIENT
Start: 2023-06-05 | End: 2023-11-09 | Stop reason: SDUPTHER

## 2023-06-05 RX ORDER — HYDROCODONE BITARTRATE AND ACETAMINOPHEN 10; 325 MG/1; MG/1
1 TABLET ORAL EVERY 6 HOURS PRN
Qty: 90 TABLET | Refills: 0 | Status: SHIPPED | OUTPATIENT
Start: 2023-06-05 | End: 2023-06-30 | Stop reason: SDUPTHER

## 2023-06-05 NOTE — TELEPHONE ENCOUNTER
Refill Routing Note   Medication(s) are not appropriate for processing by [facility] for the following reason(s):      Patient not seen by PCP within 15 months  Patient seen in ED/Hospital since LOV with PCP    [facility] action(s):  Defer Appointment due  Labs due            Appointments  past 12m or future 3m with PCP    Date Provider   Last Visit   4/15/2021 Kareem Arroyo MD   Next Visit   6/4/2023 Kareem Arroyo MD   ED visits in past 90 days: 0        Note composed:3:58 AM 06/05/2023

## 2023-06-08 ENCOUNTER — OFFICE VISIT (OUTPATIENT)
Dept: HEMATOLOGY/ONCOLOGY | Facility: CLINIC | Age: 48
End: 2023-06-08
Payer: MEDICARE

## 2023-06-08 ENCOUNTER — LAB VISIT (OUTPATIENT)
Dept: LAB | Facility: HOSPITAL | Age: 48
End: 2023-06-08
Attending: NURSE PRACTITIONER
Payer: MEDICARE

## 2023-06-08 VITALS
SYSTOLIC BLOOD PRESSURE: 120 MMHG | DIASTOLIC BLOOD PRESSURE: 66 MMHG | HEIGHT: 74 IN | HEART RATE: 77 BPM | RESPIRATION RATE: 18 BRPM | BODY MASS INDEX: 40.43 KG/M2 | OXYGEN SATURATION: 98 % | TEMPERATURE: 98 F | WEIGHT: 315 LBS

## 2023-06-08 DIAGNOSIS — T45.1X5A CHEMOTHERAPY-INDUCED NEUTROPENIA: ICD-10-CM

## 2023-06-08 DIAGNOSIS — C79.51 MALIGNANT NEOPLASM METASTATIC TO BONE: ICD-10-CM

## 2023-06-08 DIAGNOSIS — C50.021 MALIGNANT NEOPLASM INVOLVING BOTH NIPPLE AND AREOLA OF RIGHT BREAST IN MALE, ESTROGEN RECEPTOR NEGATIVE: Primary | ICD-10-CM

## 2023-06-08 DIAGNOSIS — D64.81 ANEMIA ASSOCIATED WITH CHEMOTHERAPY: ICD-10-CM

## 2023-06-08 DIAGNOSIS — C50.021 MALIGNANT NEOPLASM INVOLVING BOTH NIPPLE AND AREOLA OF RIGHT BREAST IN MALE, ESTROGEN RECEPTOR NEGATIVE: ICD-10-CM

## 2023-06-08 DIAGNOSIS — Z17.1 MALIGNANT NEOPLASM INVOLVING BOTH NIPPLE AND AREOLA OF RIGHT BREAST IN MALE, ESTROGEN RECEPTOR NEGATIVE: ICD-10-CM

## 2023-06-08 DIAGNOSIS — G89.3 NEOPLASM RELATED PAIN: ICD-10-CM

## 2023-06-08 DIAGNOSIS — D70.1 CHEMOTHERAPY-INDUCED NEUTROPENIA: ICD-10-CM

## 2023-06-08 DIAGNOSIS — G62.9 NEUROPATHY: ICD-10-CM

## 2023-06-08 DIAGNOSIS — E11.9 TYPE 2 DIABETES MELLITUS WITHOUT COMPLICATION, WITHOUT LONG-TERM CURRENT USE OF INSULIN: ICD-10-CM

## 2023-06-08 DIAGNOSIS — Z17.1 MALIGNANT NEOPLASM INVOLVING BOTH NIPPLE AND AREOLA OF RIGHT BREAST IN MALE, ESTROGEN RECEPTOR NEGATIVE: Primary | ICD-10-CM

## 2023-06-08 DIAGNOSIS — T45.1X5A ANEMIA ASSOCIATED WITH CHEMOTHERAPY: ICD-10-CM

## 2023-06-08 LAB
ALBUMIN SERPL BCP-MCNC: 3.2 G/DL (ref 3.5–5.2)
ALP SERPL-CCNC: 250 U/L (ref 55–135)
ALT SERPL W/O P-5'-P-CCNC: 22 U/L (ref 10–44)
ANION GAP SERPL CALC-SCNC: 7 MMOL/L (ref 8–16)
AST SERPL-CCNC: 23 U/L (ref 10–40)
BILIRUB SERPL-MCNC: 0.3 MG/DL (ref 0.1–1)
BUN SERPL-MCNC: 13 MG/DL (ref 6–20)
CALCIUM SERPL-MCNC: 9.9 MG/DL (ref 8.7–10.5)
CHLORIDE SERPL-SCNC: 103 MMOL/L (ref 95–110)
CO2 SERPL-SCNC: 25 MMOL/L (ref 23–29)
CREAT SERPL-MCNC: 1.1 MG/DL (ref 0.5–1.4)
ERYTHROCYTE [DISTWIDTH] IN BLOOD BY AUTOMATED COUNT: 18.3 % (ref 11.5–14.5)
EST. GFR  (NO RACE VARIABLE): >60 ML/MIN/1.73 M^2
ESTIMATED AVG GLUCOSE: 114 MG/DL (ref 68–131)
GLUCOSE SERPL-MCNC: 87 MG/DL (ref 70–110)
HBA1C MFR BLD: 5.6 % (ref 4–5.6)
HCT VFR BLD AUTO: 32.8 % (ref 40–54)
HGB BLD-MCNC: 10.1 G/DL (ref 14–18)
IMM GRANULOCYTES # BLD AUTO: 0.04 K/UL (ref 0–0.04)
MCH RBC QN AUTO: 23.6 PG (ref 27–31)
MCHC RBC AUTO-ENTMCNC: 30.8 G/DL (ref 32–36)
MCV RBC AUTO: 77 FL (ref 82–98)
NEUTROPHILS # BLD AUTO: 2.4 K/UL (ref 1.8–7.7)
PLATELET # BLD AUTO: 254 K/UL (ref 150–450)
PMV BLD AUTO: 10 FL (ref 9.2–12.9)
POTASSIUM SERPL-SCNC: 4.6 MMOL/L (ref 3.5–5.1)
PROT SERPL-MCNC: 8 G/DL (ref 6–8.4)
RBC # BLD AUTO: 4.28 M/UL (ref 4.6–6.2)
SODIUM SERPL-SCNC: 135 MMOL/L (ref 136–145)
WBC # BLD AUTO: 3.83 K/UL (ref 3.9–12.7)

## 2023-06-08 PROCEDURE — 99999 PR PBB SHADOW E&M-EST. PATIENT-LVL V: CPT | Mod: PBBFAC,,, | Performed by: NURSE PRACTITIONER

## 2023-06-08 PROCEDURE — 3008F PR BODY MASS INDEX (BMI) DOCUMENTED: ICD-10-PCS | Mod: CPTII,S$GLB,, | Performed by: NURSE PRACTITIONER

## 2023-06-08 PROCEDURE — 99215 OFFICE O/P EST HI 40 MIN: CPT | Mod: S$GLB,,, | Performed by: NURSE PRACTITIONER

## 2023-06-08 PROCEDURE — 3044F HG A1C LEVEL LT 7.0%: CPT | Mod: CPTII,S$GLB,, | Performed by: NURSE PRACTITIONER

## 2023-06-08 PROCEDURE — 80053 COMPREHEN METABOLIC PANEL: CPT | Performed by: NURSE PRACTITIONER

## 2023-06-08 PROCEDURE — 1159F MED LIST DOCD IN RCRD: CPT | Mod: CPTII,S$GLB,, | Performed by: NURSE PRACTITIONER

## 2023-06-08 PROCEDURE — 3078F DIAST BP <80 MM HG: CPT | Mod: CPTII,S$GLB,, | Performed by: NURSE PRACTITIONER

## 2023-06-08 PROCEDURE — 36415 COLL VENOUS BLD VENIPUNCTURE: CPT | Performed by: NURSE PRACTITIONER

## 2023-06-08 PROCEDURE — 3008F BODY MASS INDEX DOCD: CPT | Mod: CPTII,S$GLB,, | Performed by: NURSE PRACTITIONER

## 2023-06-08 PROCEDURE — 3078F PR MOST RECENT DIASTOLIC BLOOD PRESSURE < 80 MM HG: ICD-10-PCS | Mod: CPTII,S$GLB,, | Performed by: NURSE PRACTITIONER

## 2023-06-08 PROCEDURE — 1159F PR MEDICATION LIST DOCUMENTED IN MEDICAL RECORD: ICD-10-PCS | Mod: CPTII,S$GLB,, | Performed by: NURSE PRACTITIONER

## 2023-06-08 PROCEDURE — 4010F PR ACE/ARB THEARPY RXD/TAKEN: ICD-10-PCS | Mod: CPTII,S$GLB,, | Performed by: NURSE PRACTITIONER

## 2023-06-08 PROCEDURE — 3044F PR MOST RECENT HEMOGLOBIN A1C LEVEL <7.0%: ICD-10-PCS | Mod: CPTII,S$GLB,, | Performed by: NURSE PRACTITIONER

## 2023-06-08 PROCEDURE — 1111F PR DISCHARGE MEDS RECONCILED W/ CURRENT OUTPATIENT MED LIST: ICD-10-PCS | Mod: CPTII,S$GLB,, | Performed by: NURSE PRACTITIONER

## 2023-06-08 PROCEDURE — 3074F PR MOST RECENT SYSTOLIC BLOOD PRESSURE < 130 MM HG: ICD-10-PCS | Mod: CPTII,S$GLB,, | Performed by: NURSE PRACTITIONER

## 2023-06-08 PROCEDURE — 99215 PR OFFICE/OUTPT VISIT, EST, LEVL V, 40-54 MIN: ICD-10-PCS | Mod: S$GLB,,, | Performed by: NURSE PRACTITIONER

## 2023-06-08 PROCEDURE — 4010F ACE/ARB THERAPY RXD/TAKEN: CPT | Mod: CPTII,S$GLB,, | Performed by: NURSE PRACTITIONER

## 2023-06-08 PROCEDURE — 3074F SYST BP LT 130 MM HG: CPT | Mod: CPTII,S$GLB,, | Performed by: NURSE PRACTITIONER

## 2023-06-08 PROCEDURE — 1111F DSCHRG MED/CURRENT MED MERGE: CPT | Mod: CPTII,S$GLB,, | Performed by: NURSE PRACTITIONER

## 2023-06-08 PROCEDURE — 99999 PR PBB SHADOW E&M-EST. PATIENT-LVL V: ICD-10-PCS | Mod: PBBFAC,,, | Performed by: NURSE PRACTITIONER

## 2023-06-08 PROCEDURE — 85027 COMPLETE CBC AUTOMATED: CPT | Performed by: NURSE PRACTITIONER

## 2023-06-08 PROCEDURE — 83036 HEMOGLOBIN GLYCOSYLATED A1C: CPT | Performed by: FAMILY MEDICINE

## 2023-06-08 RX ORDER — DIPHENHYDRAMINE HYDROCHLORIDE 50 MG/ML
50 INJECTION INTRAMUSCULAR; INTRAVENOUS ONCE AS NEEDED
Status: CANCELLED | OUTPATIENT
Start: 2023-06-09

## 2023-06-08 RX ORDER — ATROPINE SULFATE 0.4 MG/ML
0.4 INJECTION, SOLUTION ENDOTRACHEAL; INTRAMEDULLARY; INTRAMUSCULAR; INTRAVENOUS; SUBCUTANEOUS ONCE AS NEEDED
Status: CANCELLED | OUTPATIENT
Start: 2023-06-09

## 2023-06-08 RX ORDER — HEPARIN 100 UNIT/ML
500 SYRINGE INTRAVENOUS
Status: CANCELLED | OUTPATIENT
Start: 2023-06-09

## 2023-06-08 RX ORDER — ACETAMINOPHEN 325 MG/1
650 TABLET ORAL
Status: CANCELLED | OUTPATIENT
Start: 2023-06-09

## 2023-06-08 RX ORDER — EPINEPHRINE 0.3 MG/.3ML
0.3 INJECTION SUBCUTANEOUS ONCE AS NEEDED
Status: CANCELLED | OUTPATIENT
Start: 2023-06-09

## 2023-06-08 RX ORDER — FAMOTIDINE 10 MG/ML
20 INJECTION INTRAVENOUS
Status: CANCELLED | OUTPATIENT
Start: 2023-06-09

## 2023-06-08 RX ORDER — SODIUM CHLORIDE 0.9 % (FLUSH) 0.9 %
10 SYRINGE (ML) INJECTION
Status: CANCELLED | OUTPATIENT
Start: 2023-06-09

## 2023-06-08 NOTE — PROGRESS NOTES
Subjective     Patient ID: Paul Li Jr. is a 47 y.o. male.    Chief Complaint: Malignant neoplasm involving both nipple and areola of righ    HPI    Presents for chemotherapy consent:  Sacituzumab govitecan-hziy (Trodelvy)   Received C1D1 on 2023. Of note, he did not receive day 8  In the interval, Went to ED with diarrhea and chest congestion 2023 and admitted with Rhinovirus.   Here today to be cleared for C2 Trodelvy  Tailbone pain resolved after cycle 1. No further radiating pain in groin and down left leg. Not using cane anymore.   Pain in big toe - gabapentin relieves. Takes 3 tabs TID.   Norco relieves back pain- takes no more than 4 a day.   Bowels normal.   Weight loss noted - he lost taste post cycle 1.   Would like ensure.  He continues ot have mild chest congestion and occasional cough but this is imroving.   No SOB/CP.  No palpitations.     CXR 2023  FINDINGS:  Cardiac size is normal.  Lungs are clear well aerated with no infiltrate.  Vascular catheter seen with its tip in the superior vena cava.  Impression:  See above             2023:   Hospital Course: Patient admitted to medical oncology for generalized malaise, nausea, and diarrhea. Infectious work up shows rhinovirus; likely from sick contacts at home. On  patient feels much improved. No further diarrhea. Tolerating solid PO diet without nausea or vomiting. He feels well and would like to go home. He is scheduled for follow up on  .        Diagnosis:  Metastatic TNBC progressed (prior Stage IB ([id number]) triple negative invasive ductal carcinoma with lobular features of right breast, retroareolar, Grade 2, Ki67 80%, diagnosed 2019)     Oncology History:  Metastatic  Set up for scans (due to back pain) which were consistent for recurrence.   Imagin/3/2021 MRI T/L spine:  FINDINGS:  Alignment: Within normal limits.  Vertebrae: Low T1 signal intensity in the left T12 vertebral body extending to the left pedicle,  S1 and S2 vertebral bodies with abnormal enhancement.  The vertebral heights are well maintained.  No evidence of acute fractures.  Abnormal appearance of the LEFT sacrum and ilium as well.  Discs: Degenerative disease of the level of L4-L5 with posterior annular fissure.  Conus appears within normal limits terminating at the level of the L2. level.  Cauda equina appears unremarkable.  Mild circumferential disc bulging at the level of T10-T11 abutting the ventral thecal sac.  No significant spinal canal stenosis or neural foraminal narrowing throughout the thoracic spine.  No significant spinal canal stenosis or neural foraminal narrowing throughout the lumbar spine.  Paraspinous muscles and soft tissues: Subcentimeter focal enhancement in the posterior column along the supraspinous ligament at the level of L1-L2 (sagittal series 21, image 10) potentially inflammatory versus neoplastic.  Impression:  Abnormal appearance of the T12, S1, S2 vertebral bodies as well as LEFT sacrum and ilium concerning for metastatic disease in this patient with history of breast cancer.  No evidence for significant central canal narrowing.  Additional findings, as above.  This report was flagged in Epic as abnormal.     6/9/2021 PET scan:  COMPARISON:  MRI thoracic and lumbar spine 06/03/2021  FINDINGS:  Quality of the study: Adequate.  In the head and neck, there are no hypermetabolic lesions worrisome for malignancy. There are no hypermetabolic mucosal lesions, and there are no pathologically enlarged or hypermetabolic lymph nodes.  In the chest, there is postoperative change of right mastectomy/right axillary lymph node dissection.  There is low level hypermetabolic activity with mild soft tissue thickening in the skin/superficial subcutaneous fat of the right chest wall (axial fused image 78) with SUV max 3.6.  There is soft tissue thickening measuring approximately 1.8 x 7.9 cm in the subcutaneous fat of the right chest wall  (axial series 3, image 84) with SUV max 3.1.  There are a few bilateral pulmonary nodules measuring up to 0.3 cm in the left lung (axial series 3, image 88) and 0.5 cm in the right lung (axial series 3, image 84).  These nodules are too small to characterize by PET.  There are no pathologically enlarged or hypermetabolic lymph nodes.  In the abdomen and pelvis, there is physiologic tracer distribution within the abdominal organs and excretion into the genitourinary system.  Subtle sen mesentery and multiple prominent mesenteric lymph nodes measuring up to 1.8 cm in long axis with background activity.  In the bones, there are several hypermetabolic lesions worrisome for malignancy.  Sclerotic lesion in the left T12 vertebral body (axial series 3, image 131) with SUV max 12.  Sclerotic lesions in the sacrum (for example axial series 3, image 188) with SUV max 9.7.  Sclerotic lesions in the left iliac bone (for example axial series 3, image 205) with SUV max 6.  In the extremities, there are no hypermetabolic lesions worrisome for malignancy.  Incidental findings:  Bilateral maxillary antra mucous retention cysts.  Fat containing right inguinal hernia.  Impression:  1. In this patient with right breast carcinoma status post mastectomy/right axillary lymph node dissection, there are multiple sclerotic lesions in the T12 vertebral body, sacrum, and left iliac bone with hypermetabolic activity concerning for active metastatic disease and in keeping with findings on MRI 06/03/2021.  2. Additionally there is low-level hypermetabolic activity with soft tissue thickening in the right chest wall as detailed above.  These findings are nonspecific and may be related to postoperative/post radiation change, with recurrent malignancy not excluded.  3. Nonspecific mesenteric haziness and lymph nodes which may indicate mesenteric adenitis.  Recommend clinical correlation and attention on follow-up.  4. Additional findings as  above.  I, Sohan Tillman MD, attest that I reviewed and interpreted the images.     In summary,   Started aplelisib 8/22/2021   Abraxane C1 started 8/13/2021  We had sent Guardant and discussed results with Molecular tumor board  He also had a repeat bx to confirm metastatic triple negative breast cancer  Plan:   Nab-Paclitaxel and Alpelisib  Reference: https://ascopubs.org/doi/abs/10.1200/JCO.[email or web address]_suppl.1018  Also on Zometa-      - C1D1 Abraxane 8/13/2021  - Started aplelisib 8/22/2021               Oncology History   Malignant neoplasm involving both nipple and areola of right breast in male, estrogen receptor negative   5/1/2019 Imaging Significant Findings     Mammogram and US  Impression:  Right  Mass: Right breast 14 mm mass at the retroareolar anterior position. Assessment: 4 - Suspicious finding. Biopsy is recommended.   Left  There is no mammographic or sonographic evidence of malignancy.  Recommendation:  Biopsy is recommended.      5/2/2019 Biopsy     BREAST, RIGHT, RETROAREOLAR MASS, ULTRASOUND-GUIDED BIOPSY:  Invasive ductal carcinoma with lobular features.  Size of invasive carcinoma: 5 MM in greatest linear dimension within a single      5/2/2019 Breast Tumor Markers     Estrogen: Negative  Progesterone: Negative  HER2: Negative  Ki67: 80      5/17/2019 Cancer Staged     Staging form: Breast, AJCC 8th Edition  - Clinical stage from 5/17/2019: Stage IB (cT1c, cN0, cM0, G2, ER-, CT-, HER2-) - Signed by Richa Bahena MD on 5/17/2019 5/27/2019 - 7/21/2019 Chemotherapy     Treatment Summary   Plan Name: OP BREAST DOSE-DENSE AC - DOXORUBICIN CYCLOPHOSPHAMIDE Q2W  Treatment Goal: Curative  Status: Inactive  Start Date: 5/27/2019  End Date: 7/9/2019  Provider: Richa Bahena MD  Chemotherapy: DOXOrubicin chemo injection 186 mg, 60 mg/m2 = 186 mg, Intravenous, Clinic/HOD 1 time, 4 of 4 cycles  Administration: 186 mg (5/27/2019), 186 mg (6/10/2019), 186 mg (6/24/2019), 186 mg  (7/8/2019)  cyclophosphamide (CYTOXAN) 600 mg/m2 = 1,865 mg in sodium chloride 0.9% 250 mL chemo infusion, 600 mg/m2 = 1,865 mg, Intravenous, Clinic/HOD 1 time, 4 of 4 cycles  Administration: 1,865 mg (5/27/2019), 1,865 mg (6/10/2019), 1,865 mg (6/24/2019), 1,865 mg (7/8/2019)      7/22/2019 - 10/15/2019 Chemotherapy     Treatment Summary   Plan Name: OP PACLITAXEL QW  Treatment Goal: Curative  Status: Inactive  Start Date: 7/24/2019  End Date: 10/8/2019  Provider: Richa Bahena MD  Chemotherapy: PACLitaxel (TAXOL) 80 mg/m2 = 246 mg in sodium chloride 0.9% 250 mL chemo infusion, 80 mg/m2 = 246 mg, Intravenous, Clinic/HOD 1 time, 12 of 12 cycles  Dose modification: 60 mg/m2 (original dose 80 mg/m2, Cycle 3), 55 mg/m2 (original dose 80 mg/m2, Cycle 7), 60 mg/m2 (original dose 80 mg/m2, Cycle 7), 50 mg/m2 (original dose 80 mg/m2, Cycle 9), 50 mg/m2 (original dose 80 mg/m2, Cycle 10)  Administration: 246 mg (7/24/2019), 246 mg (7/31/2019), 186 mg (8/7/2019), 186 mg (8/14/2019), 186 mg (8/21/2019), 186 mg (8/28/2019), 186 mg (9/4/2019), 174 mg (9/11/2019), 156 mg (9/18/2019), 156 mg (9/25/2019), 156 mg (10/1/2019), 156 mg (10/8/2019)      11/22/2019 Breast Surgery     On 11/22/19 Dr whyte performed a right breast mastectomy with SLN biopsy with pathology showing grade 2, 6 mm IDC with extensive treatment effect, no LVI with 1 LN positive 4 mm, negative margins. The on 12/17/19, Dr Whyte performed right axillary dissection with pathology still pending.      11/22/2019 Cancer Staged     ypT1b N1      12/17/2019 Breast Surgery     Surgery: Dr Shima Whyte, [phone] performed right ALND with pathology showing 14 negative lymph nodes.             1/14/2020 Tumor Conference      Post mastectomy radiation therapy: Xeloda, then possible Keytruda trial .      2/3/2020 - 3/23/2020 Radiation Therapy     Treating physician: Speedy Nair MD   Total Dose: 50.4 Gy to the chest wall and regional lymphatics  10 Gy boost  to the prostate.   Fractions: 28 fractions at 1.8 Gy per fraction  5 fractions at 2 Gy per fraction       2/28/2020 -  Chemotherapy     * 4/15/2020 - 9/28/20 completed 6 cycles adjuvant Xeloda 1000 mg/m2 bid days 1-14 every 21 days  Treatment Summary   Plan Name: OP CAPECITABINE 1250MG/M2 BID Q3W  Treatment Goal: Curative  Status: Active  Start Date: 3/20/2020  End Date: 3/20/2020  Provider: Richa Bahena MD  Chemotherapy: [No matching medication found in this treatment plan]            Hydrocele repaired.       - 3/29/2023 MRI sacrum/coccyx:  FINDINGS:  There is abnormal T1 hypointense signal throughout the sacrum as well as the visualized left hemipelvis.  Multiple discrete foci also noted within the right partially visualized iliac bone.  These are accompanied by heterogeneous T2 signal as well as contrast enhancement and in keeping with osseous metastatic disease.  No evidence for epidural extension into the sacral canal or involvement of the sacral foramina.  No visualized soft tissue lesions.  No fracture.  Nonspecific mild soft tissue edema noted within the bilateral gluteal minimus, piriformis and left iliacus muscles.  Mild degenerative changes are noted in the sacroiliac joints.  No pelvic ascites or lymphadenopathy seen.  Visualized sciatic nerves are unremarkable.  Impression:  Extensive osseous metastatic disease of the pelvis involving much of the sacrum.  No evidence for invasion of the sacral canal or neural foramina.    - MRI L-spine:  FINDINGS:  Alignment: Normal.  Vertebrae:  Numerous T1 hypointense, STIR hyperintense, enhancing lesions are seen throughout the lumbar spine..  No fracture.  No epidural extension.  Discs: Disc desiccation and mild height loss of L4-5.  Posterior annular fissure of L4-5.  Remaining discs appear normal in height and signal.  No evidence for discitis.  Cord: Normal.  Conus terminates at L2  Degenerative findings:  T12-L1: No spinal canal stenosis or neural foraminal  narrowing.  L1-L2: No spinal canal stenosis or neural foraminal narrowing.  L2-L3: Mild facet arthropathy.  No spinal canal stenosis or neural foraminal narrowing.  L3-L4: Mild facet arthropathy.  No spinal canal stenosis or neural foraminal narrowing.  L4-L5: Circumferential disc bulge and moderate facet arthropathy.  No spinal canal stenosis or neural foraminal narrowing.  L5-S1: Mild facet arthropathy.  No spinal canal stenosis or neural foraminal narrowing.  Paraspinal muscles & soft tissues: Mild paraspinal muscle atrophy.  Impression:  Redemonstration of known diffuse osseous metastatic disease throughout the lumbar spine.  When compared to recent PET-CT, findings appear stable but progressed from prior MRI on 06/03/2021.  No fracture. No epidural extension.     - 3/20/2023 PET scan:  FINDINGS:  Quality of the study: Adequate.  In the head and neck, there are no hypermetabolic lesions worrisome for malignancy. There are no hypermetabolic mucosal lesions, and there are no pathologically enlarged or hypermetabolic lymph nodes.  In the chest, there is postoperative change of right mastectomy and right axillary lymph node dissection.  There is a new 1.1 cm right hilar lymph node (3-101) with max SUV 6.3 (603-99).  There is a right lower lobe pulmonary nodule measuring 0.9 cm, which does not show increased tracer uptake, though has been gradually enlarging (measured 0.3 cm on 06/09/2021) (3-106).  In the abdomen and pelvis, there is physiologic tracer distribution within the abdominal organs and excretion into the genitourinary system.  In the bones, there are numerous sclerotic lesions, with few index lesions detailed below:  Sclerotic lesion in T7 with tracer uptake mildly increased prior exam with SUV max 5.8 (previously 5.2) (3-107) (603-109).  T12 sclerotic focus demonstrates an SUV max of 5.9 (previously 4.8) (3-153, 603-153).  L5 vertebral body sclerotic lesion with an SUV max of 6.8 (previously 8.7)  (3-213).  Newly hypermetabolic focus at the inferior aspect of the left scapula with SUV max of 7.4 (603-103).  Increased size and uptake of a left ischial tuberosity sclerotic lesion with SUV max of 7.3 (3-266).  Increased size of a L4 vertebral body sclerotic lesion (3-200).  Extensive sclerotic change through the pelvic bones grossly stable compared to the prior exam.  CT: Right maxillary mucosal retention cysts.  Left chest wall Port-A-Cath.  Postoperative change of right mastectomy with right axillary lymph node dissection.  Small bilateral fat containing inguinal hernias.  Impression:  In this patient with breast cancer status post right mastectomy and right axillary lymph node dissection there has been progression of disease.  Newly tracer avid right hilar lymph node consistent with derick metastasis.  In the bones there are numerous sclerotic lesions, some of which demonstrate new abnormal uptake on FDG PET.  Suspicious right lower lobe pulmonary nodule does not show increase tracer uptake, though it is been gradually enlarging, and attention on follow-up is advised.  This report was flagged in Epic as abnormal.    Started Trodelvy 5/19/2023.   Note- did not receive day 8 with cycle 1.       Review of Systems   Constitutional:         See Above   All other systems reviewed and are negative.       Objective     Physical Exam  Vitals and nursing note reviewed.   Constitutional:       General: He is not in acute distress.     Appearance: Normal appearance. He is well-developed.      Comments: Presents with spouse  Weight loss noted   HENT:      Head: Normocephalic and atraumatic.      Nose: Nose normal.   Eyes:      General: Lids are normal. No scleral icterus.     Conjunctiva/sclera: Conjunctivae normal.      Pupils: Pupils are equal, round, and reactive to light.   Neck:      Thyroid: No thyromegaly.      Vascular: No JVD.      Trachea: Trachea normal.   Cardiovascular:      Rate and Rhythm: Normal rate and  regular rhythm.      Pulses: Normal pulses.      Heart sounds: Normal heart sounds. No murmur heard.  Pulmonary:      Effort: Pulmonary effort is normal.      Breath sounds: Rales present. No wheezing.   Abdominal:      General: Bowel sounds are normal. There is no distension.      Palpations: Abdomen is soft. There is no splenomegaly or mass.      Tenderness: There is no abdominal tenderness.      Comments: No organomegaly.    Musculoskeletal:         General: Swelling (RUE - chronic) present. Normal range of motion.      Cervical back: Normal range of motion and neck supple.      Right lower leg: Edema (trace) present.      Left lower leg: Edema (trace) present.      Comments: No spinal or paraspinal tenderness.    Lymphadenopathy:      Head:      Right side of head: No submental or submandibular adenopathy.      Left side of head: No submental or submandibular adenopathy.      Cervical: No cervical adenopathy.      Upper Body:      Right upper body: No supraclavicular or axillary adenopathy.      Left upper body: No supraclavicular or axillary adenopathy.   Skin:     General: Skin is warm and dry.      Capillary Refill: Capillary refill takes less than 2 seconds.      Findings: No bruising or rash.      Nails: There is no clubbing.   Neurological:      Mental Status: He is alert and oriented to person, place, and time.      Deep Tendon Reflexes: Reflexes are normal and symmetric.   Psychiatric:         Speech: Speech normal.         Behavior: Behavior normal.     Labs: reviewed  Imaging: reviewed     Assessment and Plan     1. Malignant neoplasm involving both nipple and areola of right breast in male, estrogen receptor negative  NM PET CT Routine Skull to Mid Thigh      2. Bone metastases  NM PET CT Routine Skull to Mid Thigh      3. Neoplasm related pain  NM PET CT Routine Skull to Mid Thigh      4. Neuropathy        5. Anemia associated with chemotherapy        6. Chemotherapy-induced neutropenia             Overall doing well with improvement in chest congestion and cough from recent rhinovirus episode.   Most recent CXR clear.   Labs reviewed and adequate.   Ok to proceed with C2D1 Trodelvy tomorrow. Dose adjusted for weight.   Rtc 1 week for day 8 - will give Zometa as due.  Will scan before cycle 3 (need new baseline)   Monitor alk phos  Continue pain regimen as controlling neoplasm related pain.    Nutrition consult as taste changes with new treatment.   SW to help get ensure.   Encouraged hydration      DM, HTN- controlled  Neuropathy- controlled with gabapentin      Addendum: zometa due  Route Chart for Scheduling    Med Onc Chart Routing  Urgent    Follow up with physician 1 week. see Dr. Linn or JUSTYNA  in 1 week with cbc, cmp and day 8 trodelvy. rtc to see Dr. Linn 3 weeks with cbc, cmp and C3D1 Trodelvy   Follow up with JAC    Infusion scheduling note    Injection scheduling note    Labs    Imaging PET scan   in 2 weeks   Pharmacy appointment    Other referrals          Treatment Plan Information   OP SACITUZUMAB GOVITECAN-HZIY Q3W   Rosa Linn MD   Upcoming Treatment Dates - OP SACITUZUMAB GOVITECAN-HZIY Q3W    6/9/2023       Pre-Medications       acetaminophen tablet 650 mg       diphenhydrAMINE (BENADRYL) 25 mg in sodium chloride 0.9% 50 mL IVPB       famotidine (PF) injection 20 mg       Chemotherapy       sacituzumab govitecan-hziy (TRODELVY) 1,480 mg in sodium chloride 0.9% 500 mL chemo infusion       Supportive Care       atropine injection 0.4 mg       Antiemetics       aprepitant (CINVANTI) injection 130 mg       palonosetron (ALOXI) 0.25 mg with Dexamethasone (DECADRON) 12 mg in NS 50 mL IVPB  6/16/2023       Pre-Medications       acetaminophen tablet 650 mg       diphenhydrAMINE (BENADRYL) 25 mg in sodium chloride 0.9% 50 mL IVPB       famotidine (PF) injection 20 mg       Chemotherapy       sacituzumab govitecan-hziy (TRODELVY) 1,480 mg in sodium chloride 0.9% 500 mL chemo infusion        Supportive Care       atropine injection 0.4 mg       Antiemetics       aprepitant (CINVANTI) injection 130 mg       palonosetron (ALOXI) 0.25 mg with Dexamethasone (DECADRON) 12 mg in NS 50 mL IVPB  6/30/2023       Pre-Medications       acetaminophen tablet 650 mg       diphenhydrAMINE (BENADRYL) 25 mg in sodium chloride 0.9% 50 mL IVPB       famotidine (PF) injection 20 mg       Chemotherapy       sacituzumab govitecan-hziy (TRODELVY) 1,480 mg in sodium chloride 0.9% 500 mL chemo infusion       Supportive Care       atropine injection 0.4 mg       Antiemetics       aprepitant (CINVANTI) injection 130 mg       palonosetron (ALOXI) 0.25 mg with Dexamethasone (DECADRON) 12 mg in NS 50 mL IVPB  7/7/2023       Pre-Medications       acetaminophen tablet 650 mg       diphenhydrAMINE (BENADRYL) 25 mg in sodium chloride 0.9% 50 mL IVPB       famotidine (PF) injection 20 mg       Chemotherapy       sacituzumab govitecan-hziy (TRODELVY) 1,480 mg in sodium chloride 0.9% 500 mL chemo infusion       Supportive Care       atropine injection 0.4 mg       Antiemetics       aprepitant (CINVANTI) injection 130 mg       palonosetron (ALOXI) 0.25 mg with Dexamethasone (DECADRON) 12 mg in NS 50 mL IVPB    Supportive Plan Information  OP FILGRASTIM 480 MCG   Michelle Grayson, NP   Upcoming Treatment Dates - OP FILGRASTIM 480 MCG    No upcoming days in selected categories.    Therapy Plan Information  PORT FLUSH  Flushes  heparin, porcine (PF) 100 unit/mL injection flush 500 Units  500 Units, Intravenous, Every visit  sodium chloride 0.9% flush 10 mL  10 mL, Intravenous, Every visit    ZOLEDRONIC ACID (ZOMETA) IV  Medications  zoledronic 4 mg/100 mL infusion 4 mg  4 mg, Intravenous, Every 4 weeks  Flushes  sodium chloride 0.9% 250 mL flush bag  Intravenous, Every 4 weeks  sodium chloride 0.9% flush 10 mL  10 mL, Intravenous, Every 4 weeks  heparin, porcine (PF) 100 unit/mL injection flush 500 Units  500 Units, Intravenous, Every 4  weeks  alteplase injection 2 mg  2 mg, Intra-Catheter, Every 4 weeks     Patient is in agreement with the proposed treatment plan. All questions were answered to the patient's satisfaction. Pt knows to call clinic for any new or worsening symptoms and if anything is needed before the next clinic visit. Case discussed with Dr. Linn, who agrees with above.         Richard Keys, KARIP-C  Hematology & Oncology  [facility] [place] [address]  ph. [phone]  Fax. [phone]       40 minutes of total time spent on the encounter, which includes face to face time and non-face to face time preparing to see the patient (eg, review of tests), Obtaining and/or reviewing separately obtained history, Documenting clinical information in the electronic or other health record, Independently interpreting results (not separately reported) and communicating results to the patient/family/caregiver, or Care coordination (not separately reported).

## 2023-06-08 NOTE — Clinical Note
Also, please ask  to contact him as he would like some ensure.  And ask Nutrition to set up a virtual asap. Thanks,  PJ

## 2023-06-08 NOTE — Clinical Note
Niles Morales! Please make sure his appts get scheduled.  He will need day 8 Trodelvy next week along with labs and EP

## 2023-06-09 ENCOUNTER — INFUSION (OUTPATIENT)
Dept: INFUSION THERAPY | Facility: HOSPITAL | Age: 48
End: 2023-06-09
Attending: INTERNAL MEDICINE
Payer: MEDICARE

## 2023-06-09 VITALS
DIASTOLIC BLOOD PRESSURE: 70 MMHG | SYSTOLIC BLOOD PRESSURE: 133 MMHG | HEART RATE: 62 BPM | TEMPERATURE: 98 F | OXYGEN SATURATION: 98 % | RESPIRATION RATE: 18 BRPM

## 2023-06-09 DIAGNOSIS — C50.021 MALIGNANT NEOPLASM INVOLVING BOTH NIPPLE AND AREOLA OF RIGHT BREAST IN MALE, ESTROGEN RECEPTOR NEGATIVE: Primary | ICD-10-CM

## 2023-06-09 DIAGNOSIS — Z17.1 MALIGNANT NEOPLASM INVOLVING BOTH NIPPLE AND AREOLA OF RIGHT BREAST IN MALE, ESTROGEN RECEPTOR NEGATIVE: Primary | ICD-10-CM

## 2023-06-09 PROCEDURE — 25000003 PHARM REV CODE 250: Performed by: NURSE PRACTITIONER

## 2023-06-09 PROCEDURE — 96375 TX/PRO/DX INJ NEW DRUG ADDON: CPT

## 2023-06-09 PROCEDURE — 96367 TX/PROPH/DG ADDL SEQ IV INF: CPT

## 2023-06-09 PROCEDURE — 63600175 PHARM REV CODE 636 W HCPCS: Performed by: NURSE PRACTITIONER

## 2023-06-09 PROCEDURE — 96413 CHEMO IV INFUSION 1 HR: CPT

## 2023-06-09 RX ORDER — SODIUM CHLORIDE 0.9 % (FLUSH) 0.9 %
10 SYRINGE (ML) INJECTION
Status: DISCONTINUED | OUTPATIENT
Start: 2023-06-09 | End: 2023-06-09 | Stop reason: HOSPADM

## 2023-06-09 RX ORDER — FAMOTIDINE 10 MG/ML
20 INJECTION INTRAVENOUS
Status: COMPLETED | OUTPATIENT
Start: 2023-06-09 | End: 2023-06-09

## 2023-06-09 RX ORDER — ACETAMINOPHEN 325 MG/1
650 TABLET ORAL
Status: COMPLETED | OUTPATIENT
Start: 2023-06-09 | End: 2023-06-09

## 2023-06-09 RX ORDER — HEPARIN 100 UNIT/ML
500 SYRINGE INTRAVENOUS
Status: DISCONTINUED | OUTPATIENT
Start: 2023-06-09 | End: 2023-06-09 | Stop reason: HOSPADM

## 2023-06-09 RX ORDER — EPINEPHRINE 0.3 MG/.3ML
0.3 INJECTION SUBCUTANEOUS ONCE AS NEEDED
Status: DISCONTINUED | OUTPATIENT
Start: 2023-06-09 | End: 2023-06-09 | Stop reason: HOSPADM

## 2023-06-09 RX ORDER — ATROPINE SULFATE 0.4 MG/ML
0.4 INJECTION, SOLUTION ENDOTRACHEAL; INTRAMEDULLARY; INTRAMUSCULAR; INTRAVENOUS; SUBCUTANEOUS ONCE AS NEEDED
Status: DISCONTINUED | OUTPATIENT
Start: 2023-06-09 | End: 2023-06-09 | Stop reason: HOSPADM

## 2023-06-09 RX ORDER — DIPHENHYDRAMINE HYDROCHLORIDE 50 MG/ML
50 INJECTION INTRAMUSCULAR; INTRAVENOUS ONCE AS NEEDED
Status: DISCONTINUED | OUTPATIENT
Start: 2023-06-09 | End: 2023-06-09 | Stop reason: HOSPADM

## 2023-06-09 RX ADMIN — APREPITANT 130 MG: 130 INJECTION, EMULSION INTRAVENOUS at 11:06

## 2023-06-09 RX ADMIN — SACITUZUMAB GOVITECAN 1440 MG: 180 POWDER, FOR SOLUTION INTRAVENOUS at 12:06

## 2023-06-09 RX ADMIN — FAMOTIDINE 20 MG: 10 INJECTION INTRAVENOUS at 11:06

## 2023-06-09 RX ADMIN — DIPHENHYDRAMINE HYDROCHLORIDE 25 MG: 50 INJECTION, SOLUTION INTRAMUSCULAR; INTRAVENOUS at 12:06

## 2023-06-09 RX ADMIN — ACETAMINOPHEN 650 MG: 325 TABLET ORAL at 11:06

## 2023-06-09 RX ADMIN — SODIUM CHLORIDE: 9 INJECTION, SOLUTION INTRAVENOUS at 11:06

## 2023-06-09 RX ADMIN — DEXAMETHASONE SODIUM PHOSPHATE 0.25 MG: 4 INJECTION, SOLUTION INTRA-ARTICULAR; INTRALESIONAL; INTRAMUSCULAR; INTRAVENOUS; SOFT TISSUE at 11:06

## 2023-06-09 NOTE — PLAN OF CARE
1115 Labs, hx, and medications reviewed, pt meets parameters for treatment today. Assessment completed and plan of care reviewed. Pt verbalized understanding. PAC accessed with no complications. Pt voices no new complaints or concerns, will continue to monitor for safety.

## 2023-06-09 NOTE — PLAN OF CARE
1447 Pt tolerated Trodelvy infusion well today, no complaints or complications,. VSS through duration of treatment. Pt aware to call provider with any questions or concerns and is aware of upcoming appts. Pt ambulatory from clinic with steady gait, no distress noted.

## 2023-06-12 ENCOUNTER — OFFICE VISIT (OUTPATIENT)
Dept: INTERNAL MEDICINE | Facility: CLINIC | Age: 48
End: 2023-06-12
Payer: MEDICARE

## 2023-06-12 VITALS
BODY MASS INDEX: 40.43 KG/M2 | HEIGHT: 74 IN | SYSTOLIC BLOOD PRESSURE: 132 MMHG | HEART RATE: 99 BPM | RESPIRATION RATE: 15 BRPM | DIASTOLIC BLOOD PRESSURE: 80 MMHG | WEIGHT: 315 LBS

## 2023-06-12 DIAGNOSIS — G63 POLYNEUROPATHY ASSOCIATED WITH UNDERLYING DISEASE: ICD-10-CM

## 2023-06-12 DIAGNOSIS — Z13.220 SCREENING FOR LIPID DISORDERS: ICD-10-CM

## 2023-06-12 DIAGNOSIS — M10.00 IDIOPATHIC GOUT, UNSPECIFIED CHRONICITY, UNSPECIFIED SITE: ICD-10-CM

## 2023-06-12 DIAGNOSIS — R26.9 ABNORMALITY OF GAIT AND MOBILITY: ICD-10-CM

## 2023-06-12 DIAGNOSIS — E11.65 UNCONTROLLED TYPE 2 DIABETES MELLITUS WITH HYPERGLYCEMIA: ICD-10-CM

## 2023-06-12 DIAGNOSIS — Z79.899 IMMUNOSUPPRESSED DUE TO CHEMOTHERAPY: ICD-10-CM

## 2023-06-12 DIAGNOSIS — G47.30 SLEEP APNEA, UNSPECIFIED TYPE: ICD-10-CM

## 2023-06-12 DIAGNOSIS — I15.2 HYPERTENSION ASSOCIATED WITH DIABETES: ICD-10-CM

## 2023-06-12 DIAGNOSIS — Z17.1 MALIGNANT NEOPLASM INVOLVING BOTH NIPPLE AND AREOLA OF RIGHT BREAST IN MALE, ESTROGEN RECEPTOR NEGATIVE: ICD-10-CM

## 2023-06-12 DIAGNOSIS — E11.59 HYPERTENSION ASSOCIATED WITH DIABETES: ICD-10-CM

## 2023-06-12 DIAGNOSIS — D70.1 LEUKOPENIA DUE TO ANTINEOPLASTIC CHEMOTHERAPY: ICD-10-CM

## 2023-06-12 DIAGNOSIS — Z95.828 PORT-A-CATH IN PLACE: ICD-10-CM

## 2023-06-12 DIAGNOSIS — T45.1X5A CHEMOTHERAPY-INDUCED NEUTROPENIA: ICD-10-CM

## 2023-06-12 DIAGNOSIS — T45.1X5A ANEMIA ASSOCIATED WITH CHEMOTHERAPY: ICD-10-CM

## 2023-06-12 DIAGNOSIS — D70.1 CHEMOTHERAPY-INDUCED NEUTROPENIA: ICD-10-CM

## 2023-06-12 DIAGNOSIS — D64.81 ANEMIA ASSOCIATED WITH CHEMOTHERAPY: ICD-10-CM

## 2023-06-12 DIAGNOSIS — E66.01 MORBID OBESITY: ICD-10-CM

## 2023-06-12 DIAGNOSIS — C79.51 MALIGNANT NEOPLASM METASTATIC TO BONE: ICD-10-CM

## 2023-06-12 DIAGNOSIS — I89.0 LYMPHEDEMA OF RIGHT ARM: ICD-10-CM

## 2023-06-12 DIAGNOSIS — T45.1X5A LEUKOPENIA DUE TO ANTINEOPLASTIC CHEMOTHERAPY: ICD-10-CM

## 2023-06-12 DIAGNOSIS — C50.021 MALIGNANT NEOPLASM INVOLVING BOTH NIPPLE AND AREOLA OF RIGHT BREAST IN MALE, ESTROGEN RECEPTOR NEGATIVE: ICD-10-CM

## 2023-06-12 DIAGNOSIS — T45.1X5A IMMUNOSUPPRESSED DUE TO CHEMOTHERAPY: ICD-10-CM

## 2023-06-12 DIAGNOSIS — F51.04 PSYCHOPHYSIOLOGICAL INSOMNIA: ICD-10-CM

## 2023-06-12 DIAGNOSIS — Z79.899 LONG-TERM USE OF HIGH-RISK MEDICATION: ICD-10-CM

## 2023-06-12 DIAGNOSIS — Z00.00 ENCOUNTER FOR PREVENTIVE HEALTH EXAMINATION: Primary | ICD-10-CM

## 2023-06-12 DIAGNOSIS — F43.23 ADJUSTMENT DISORDER WITH MIXED ANXIETY AND DEPRESSED MOOD: ICD-10-CM

## 2023-06-12 DIAGNOSIS — D84.821 IMMUNOSUPPRESSED DUE TO CHEMOTHERAPY: ICD-10-CM

## 2023-06-12 PROBLEM — E66.813 CLASS 3 SEVERE OBESITY DUE TO EXCESS CALORIES WITH SERIOUS COMORBIDITY AND BODY MASS INDEX (BMI) OF 50.0 TO 59.9 IN ADULT: Status: RESOLVED | Noted: 2019-10-10 | Resolved: 2023-06-12

## 2023-06-12 PROCEDURE — G0439 PPPS, SUBSEQ VISIT: HCPCS | Mod: S$GLB,,, | Performed by: NURSE PRACTITIONER

## 2023-06-12 PROCEDURE — 1159F PR MEDICATION LIST DOCUMENTED IN MEDICAL RECORD: ICD-10-PCS | Mod: CPTII,S$GLB,, | Performed by: NURSE PRACTITIONER

## 2023-06-12 PROCEDURE — G0439 PR MEDICARE ANNUAL WELLNESS SUBSEQUENT VISIT: ICD-10-PCS | Mod: S$GLB,,, | Performed by: NURSE PRACTITIONER

## 2023-06-12 PROCEDURE — 1160F PR REVIEW ALL MEDS BY PRESCRIBER/CLIN PHARMACIST DOCUMENTED: ICD-10-PCS | Mod: CPTII,S$GLB,, | Performed by: NURSE PRACTITIONER

## 2023-06-12 PROCEDURE — 99999 PR PBB SHADOW E&M-EST. PATIENT-LVL V: ICD-10-PCS | Mod: PBBFAC,,, | Performed by: NURSE PRACTITIONER

## 2023-06-12 PROCEDURE — 3008F PR BODY MASS INDEX (BMI) DOCUMENTED: ICD-10-PCS | Mod: CPTII,S$GLB,, | Performed by: NURSE PRACTITIONER

## 2023-06-12 PROCEDURE — 3079F DIAST BP 80-89 MM HG: CPT | Mod: CPTII,S$GLB,, | Performed by: NURSE PRACTITIONER

## 2023-06-12 PROCEDURE — 4010F PR ACE/ARB THEARPY RXD/TAKEN: ICD-10-PCS | Mod: CPTII,S$GLB,, | Performed by: NURSE PRACTITIONER

## 2023-06-12 PROCEDURE — 3044F PR MOST RECENT HEMOGLOBIN A1C LEVEL <7.0%: ICD-10-PCS | Mod: CPTII,S$GLB,, | Performed by: NURSE PRACTITIONER

## 2023-06-12 PROCEDURE — 1160F RVW MEDS BY RX/DR IN RCRD: CPT | Mod: CPTII,S$GLB,, | Performed by: NURSE PRACTITIONER

## 2023-06-12 PROCEDURE — 3044F HG A1C LEVEL LT 7.0%: CPT | Mod: CPTII,S$GLB,, | Performed by: NURSE PRACTITIONER

## 2023-06-12 PROCEDURE — 3079F PR MOST RECENT DIASTOLIC BLOOD PRESSURE 80-89 MM HG: ICD-10-PCS | Mod: CPTII,S$GLB,, | Performed by: NURSE PRACTITIONER

## 2023-06-12 PROCEDURE — 3075F SYST BP GE 130 - 139MM HG: CPT | Mod: CPTII,S$GLB,, | Performed by: NURSE PRACTITIONER

## 2023-06-12 PROCEDURE — 99999 PR PBB SHADOW E&M-EST. PATIENT-LVL V: CPT | Mod: PBBFAC,,, | Performed by: NURSE PRACTITIONER

## 2023-06-12 PROCEDURE — 3075F PR MOST RECENT SYSTOLIC BLOOD PRESS GE 130-139MM HG: ICD-10-PCS | Mod: CPTII,S$GLB,, | Performed by: NURSE PRACTITIONER

## 2023-06-12 PROCEDURE — 3008F BODY MASS INDEX DOCD: CPT | Mod: CPTII,S$GLB,, | Performed by: NURSE PRACTITIONER

## 2023-06-12 PROCEDURE — 4010F ACE/ARB THERAPY RXD/TAKEN: CPT | Mod: CPTII,S$GLB,, | Performed by: NURSE PRACTITIONER

## 2023-06-12 PROCEDURE — 1159F MED LIST DOCD IN RCRD: CPT | Mod: CPTII,S$GLB,, | Performed by: NURSE PRACTITIONER

## 2023-06-12 NOTE — PATIENT INSTRUCTIONS
Counseling and Referral of Other Preventative  (Italic type indicates deductible and co-insurance are waived)    Patient Name: Paul Li  Today's Date: 6/12/2023    Health Maintenance         Date Due Completion Date    Foot Exam Referral to podiatry   11/6/2019    Diabetes Urine Screening Ordered   1/2/2020    Lipid Panel Ordered   5/26/2022    Colorectal Cancer Screening Due- check with oncology & PXP if OK to get done now while on chemo or whar are the other noninvasive options ---    Pneumococcal Vaccines (Age 0-64) (1 - PCV) 06/20/2024 (Originally 10/17/1981) ---    Low Dose Statin N/A   ---    Influenza Vaccine (Season Ended) 09/01/2023 ---    Eye Exam 11/14/2023 11/14/2022    Hemoglobin A1c 12/08/2023 6/8/2023    TETANUS VACCINE    Check with insurance regarding obesity benefits- losing weight now    ASk oncology MD about integrative medicine consults for nutrition, meditation, etc    [facility]-  weight loss program [facility] program- obesity & HTN             01/14/2032 1/14/2022          Orders Placed This Encounter   Procedures    Lipid Panel    Ambulatory referral/consult to Podiatry       The following information is provided to all patients.  This information is to help you find resources for any of the problems found today that may be affecting your health:                Living healthy guide: www.[facility].[place].gov      Understanding Diabetes: www.diabetes.org      Eating healthy: www.cdc.gov/healthyweight      CDC home safety checklist: www.cdc.gov/steadi/patient.html      Agency on Aging: www.goea.[place].gov      Alcoholics anonymous (AA): www.aa.org      Physical Activity: www.claudia.nih.gov/yv6wyiq      Tobacco use: www.quitwithusla.org

## 2023-06-12 NOTE — PROGRESS NOTES
"Paul Li presented for a  Medicare AWV and comprehensive Health Risk Assessment today. The following components were reviewed and updated:    Medical history  Family History  Social history  Allergies and Current Medications  Health Risk Assessment  Health Maintenance  Care Team     ** See Completed Assessments for Annual Wellness Visit within the encounter summary.**       The following assessments were completed:  Living Situation  CAGE  Depression Screening  Timed Get Up and Go  Whisper Test  Cognitive Function Screening  Nutrition Screening-12  ADL Screening  PAQ Screening      Vitals:    06/12/23 1035   BP: 132/80   BP Location: Left arm   Patient Position: Sitting   Pulse: 99   Resp: 15   Weight: (!) 151.1 kg (333 lb 1.8 oz)   Height: 6' 2" (1.88 m)     Body mass index is 42.77 kg/m².  Physical Exam  Constitutional:       Comments: Younger in appearance than age   HENT:      Head:      Comments: Wears mask     Right Ear: There is no impacted cerumen.      Left Ear: There is no impacted cerumen.   Eyes:      General: No scleral icterus.  Cardiovascular:      Rate and Rhythm: Normal rate and regular rhythm.   Pulmonary:      Effort: Pulmonary effort is normal.      Breath sounds: Normal breath sounds.   Abdominal:      Palpations: Abdomen is soft.      Comments: obese   Musculoskeletal:         General: Swelling present.      Comments: RUE lymphedema stocking in place   Skin:     General: Skin is warm and dry.   Neurological:      Mental Status: He is alert and oriented to person, place, and time.   Psychiatric:         Mood and Affect: Mood normal.         Behavior: Behavior normal.         Thought Content: Thought content normal.         Judgment: Judgment normal.          Medication review & Opioid screening performed during rooming section. Norco prescribed by another provider.  Review for substance use disorder screening performed during rooming section. No substance abuse noted on " screening.      Diagnoses and health risks identified today and associated recommendations/orders:    1. Adjustment disorder with mixed anxiety and depressed mood  Stable followed by psych    2. Lymphedema of right arm  Stable followed by oncology    3. Chemotherapy-induced neutropenia  Stable followed by oncology    4. Bone metastases  Stable followed by oncology    5. Malignant neoplasm involving both nipple and areola of right breast in male, estrogen receptor negative  Stable followed by oncology    6. Psychophysiological insomnia  Stable followed by PCP    7. Leukopenia due to antineoplastic chemotherapy  Stable followed by oncology    8. Uncontrolled type 2 diabetes mellitus with hyperglycemia  Stable followed by PCP  - Lipid Panel; Future    9. Sleep apnea, unspecified type  Stable followed by sleep med- uses cpap    10. Anemia associated with chemotherapy  Stable followed by oncology    11. Long-term use of high-risk medication  Stable followed by oncology    12. Immunosuppressed due to chemotherapy  Stable followed by oncology    13. Idiopathic gout, unspecified chronicity, unspecified site  Stable followed by  PCP    14. Hypertension associated with diabetes  Stable followed by PCP  - Lipid Panel; Future    15. Polyneuropathy associated with underlying disease  Stable followed by PCP  - Ambulatory referral/consult to Podiatry; Future    16. Screening for lipid disorders  Stable followed by PCP  - Lipid Panel; Future    17. Morbid obesity  Chronic . Followed by PCP.   Centers for Disease Control and Prevention (CDC)  weight recommendations for current BMI & ideal BMI range discussed with patient.  Recommended  gradual continued weight loss,  mediterranean diet , diabetic diet , no added salt diet structured regular exercise every day.     - Lipid Panel; Future    18. Abnormality of gait and mobility  Stable followed by PCP    19. Encounter for preventive health examination  Here for Health Risk  Assessment/Annual Wellness Visit.  Health maintenance reviewed and updated. Follow up in one year.          Provided Paul with a 5-10 year written screening schedule and personal prevention plan. Recommendations were developed using the USPSTF age appropriate recommendations. Education, counseling, and referrals were provided as needed. After Visit Summary printed and given to patient which includes a list of additional screenings\tests needed. Check with insurance regarding obesity benefits- losing weight now.ASk oncology MD about integrative medicine consults for nutrition, meditation, etc.[facility]-  weight loss program [facility] program- obesity & HTN  follow-up in 1 year HRAJOSE Briones offered to discuss advanced care planning, including how to pick a person who would make decisions for you if you were unable to make them for yourself, called a health care power of , and what kind of decisions you might make such as use of life sustaining treatments such as ventilators and tube feeding when faced with a life limiting illness recorded on a living will that they will need to know. (How you want to be cared for as you near the end of your natural life)     X Patient is interested in learning more about how to make advanced directives.  I provided them paperwork and offered to discuss this with them.

## 2023-06-13 ENCOUNTER — TELEPHONE (OUTPATIENT)
Dept: PALLIATIVE MEDICINE | Facility: CLINIC | Age: 48
End: 2023-06-13
Payer: MEDICARE

## 2023-06-14 ENCOUNTER — TELEPHONE (OUTPATIENT)
Dept: PALLIATIVE MEDICINE | Facility: CLINIC | Age: 48
End: 2023-06-14
Payer: MEDICARE

## 2023-06-14 ENCOUNTER — OFFICE VISIT (OUTPATIENT)
Dept: PALLIATIVE MEDICINE | Facility: CLINIC | Age: 48
End: 2023-06-14
Payer: MEDICARE

## 2023-06-14 DIAGNOSIS — G47.00 INSOMNIA, UNSPECIFIED TYPE: ICD-10-CM

## 2023-06-14 DIAGNOSIS — Z51.5 ENCOUNTER FOR PALLIATIVE CARE: ICD-10-CM

## 2023-06-14 DIAGNOSIS — F43.23 ADJUSTMENT DISORDER WITH MIXED ANXIETY AND DEPRESSED MOOD: ICD-10-CM

## 2023-06-14 DIAGNOSIS — G62.0 CHEMOTHERAPY-INDUCED NEUROPATHY: ICD-10-CM

## 2023-06-14 DIAGNOSIS — C50.021 MALIGNANT NEOPLASM INVOLVING BOTH NIPPLE AND AREOLA OF RIGHT BREAST IN MALE, ESTROGEN RECEPTOR NEGATIVE: Primary | ICD-10-CM

## 2023-06-14 DIAGNOSIS — Z71.89 ADVANCED CARE PLANNING/COUNSELING DISCUSSION: ICD-10-CM

## 2023-06-14 DIAGNOSIS — R11.0 NAUSEA: ICD-10-CM

## 2023-06-14 DIAGNOSIS — G89.3 NEOPLASM RELATED PAIN: ICD-10-CM

## 2023-06-14 DIAGNOSIS — R63.0 ANOREXIA: ICD-10-CM

## 2023-06-14 DIAGNOSIS — Z17.1 MALIGNANT NEOPLASM INVOLVING BOTH NIPPLE AND AREOLA OF RIGHT BREAST IN MALE, ESTROGEN RECEPTOR NEGATIVE: Primary | ICD-10-CM

## 2023-06-14 DIAGNOSIS — T45.1X5A CHEMOTHERAPY-INDUCED NEUROPATHY: ICD-10-CM

## 2023-06-14 DIAGNOSIS — R19.7 DIARRHEA, UNSPECIFIED TYPE: ICD-10-CM

## 2023-06-14 DIAGNOSIS — K59.00 CONSTIPATION, UNSPECIFIED CONSTIPATION TYPE: ICD-10-CM

## 2023-06-14 DIAGNOSIS — R53.0 NEOPLASTIC (MALIGNANT) RELATED FATIGUE: ICD-10-CM

## 2023-06-14 PROCEDURE — 1111F DSCHRG MED/CURRENT MED MERGE: CPT | Mod: CPTII,95,, | Performed by: STUDENT IN AN ORGANIZED HEALTH CARE EDUCATION/TRAINING PROGRAM

## 2023-06-14 PROCEDURE — 1160F RVW MEDS BY RX/DR IN RCRD: CPT | Mod: CPTII,95,, | Performed by: STUDENT IN AN ORGANIZED HEALTH CARE EDUCATION/TRAINING PROGRAM

## 2023-06-14 PROCEDURE — 3044F PR MOST RECENT HEMOGLOBIN A1C LEVEL <7.0%: ICD-10-PCS | Mod: CPTII,95,, | Performed by: STUDENT IN AN ORGANIZED HEALTH CARE EDUCATION/TRAINING PROGRAM

## 2023-06-14 PROCEDURE — 1111F PR DISCHARGE MEDS RECONCILED W/ CURRENT OUTPATIENT MED LIST: ICD-10-PCS | Mod: CPTII,95,, | Performed by: STUDENT IN AN ORGANIZED HEALTH CARE EDUCATION/TRAINING PROGRAM

## 2023-06-14 PROCEDURE — 4010F PR ACE/ARB THEARPY RXD/TAKEN: ICD-10-PCS | Mod: CPTII,95,, | Performed by: STUDENT IN AN ORGANIZED HEALTH CARE EDUCATION/TRAINING PROGRAM

## 2023-06-14 PROCEDURE — 4010F ACE/ARB THERAPY RXD/TAKEN: CPT | Mod: CPTII,95,, | Performed by: STUDENT IN AN ORGANIZED HEALTH CARE EDUCATION/TRAINING PROGRAM

## 2023-06-14 PROCEDURE — 1160F PR REVIEW ALL MEDS BY PRESCRIBER/CLIN PHARMACIST DOCUMENTED: ICD-10-PCS | Mod: CPTII,95,, | Performed by: STUDENT IN AN ORGANIZED HEALTH CARE EDUCATION/TRAINING PROGRAM

## 2023-06-14 PROCEDURE — 3044F HG A1C LEVEL LT 7.0%: CPT | Mod: CPTII,95,, | Performed by: STUDENT IN AN ORGANIZED HEALTH CARE EDUCATION/TRAINING PROGRAM

## 2023-06-14 PROCEDURE — 99215 OFFICE O/P EST HI 40 MIN: CPT | Mod: 95,,, | Performed by: STUDENT IN AN ORGANIZED HEALTH CARE EDUCATION/TRAINING PROGRAM

## 2023-06-14 PROCEDURE — 1159F PR MEDICATION LIST DOCUMENTED IN MEDICAL RECORD: ICD-10-PCS | Mod: CPTII,95,, | Performed by: STUDENT IN AN ORGANIZED HEALTH CARE EDUCATION/TRAINING PROGRAM

## 2023-06-14 PROCEDURE — 1159F MED LIST DOCD IN RCRD: CPT | Mod: CPTII,95,, | Performed by: STUDENT IN AN ORGANIZED HEALTH CARE EDUCATION/TRAINING PROGRAM

## 2023-06-14 PROCEDURE — 99215 PR OFFICE/OUTPT VISIT, EST, LEVL V, 40-54 MIN: ICD-10-PCS | Mod: 95,,, | Performed by: STUDENT IN AN ORGANIZED HEALTH CARE EDUCATION/TRAINING PROGRAM
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
## 2023-06-16 ENCOUNTER — DOCUMENTATION ONLY (OUTPATIENT)
Dept: HEMATOLOGY/ONCOLOGY | Facility: CLINIC | Age: 48
End: 2023-06-16

## 2023-06-16 ENCOUNTER — TELEPHONE (OUTPATIENT)
Dept: HEMATOLOGY/ONCOLOGY | Facility: CLINIC | Age: 48
End: 2023-06-16
Payer: MEDICARE

## 2023-06-16 ENCOUNTER — OFFICE VISIT (OUTPATIENT)
Dept: HEMATOLOGY/ONCOLOGY | Facility: CLINIC | Age: 48
End: 2023-06-16
Payer: MEDICARE

## 2023-06-16 VITALS
RESPIRATION RATE: 18 BRPM | TEMPERATURE: 98 F | OXYGEN SATURATION: 100 % | WEIGHT: 315 LBS | HEIGHT: 74 IN | DIASTOLIC BLOOD PRESSURE: 77 MMHG | BODY MASS INDEX: 40.43 KG/M2 | SYSTOLIC BLOOD PRESSURE: 139 MMHG | HEART RATE: 72 BPM

## 2023-06-16 DIAGNOSIS — D70.1 CHEMOTHERAPY-INDUCED NEUTROPENIA: ICD-10-CM

## 2023-06-16 DIAGNOSIS — T45.1X5A CHEMOTHERAPY-INDUCED NEUTROPENIA: ICD-10-CM

## 2023-06-16 DIAGNOSIS — D64.81 ANEMIA ASSOCIATED WITH CHEMOTHERAPY: ICD-10-CM

## 2023-06-16 DIAGNOSIS — E11.59 HYPERTENSION ASSOCIATED WITH DIABETES: ICD-10-CM

## 2023-06-16 DIAGNOSIS — G89.3 NEOPLASM RELATED PAIN: ICD-10-CM

## 2023-06-16 DIAGNOSIS — Z17.1 MALIGNANT NEOPLASM INVOLVING BOTH NIPPLE AND AREOLA OF RIGHT BREAST IN MALE, ESTROGEN RECEPTOR NEGATIVE: Primary | ICD-10-CM

## 2023-06-16 DIAGNOSIS — I15.2 HYPERTENSION ASSOCIATED WITH DIABETES: ICD-10-CM

## 2023-06-16 DIAGNOSIS — T45.1X5A ANEMIA ASSOCIATED WITH CHEMOTHERAPY: ICD-10-CM

## 2023-06-16 DIAGNOSIS — C50.021 MALIGNANT NEOPLASM INVOLVING BOTH NIPPLE AND AREOLA OF RIGHT BREAST IN MALE, ESTROGEN RECEPTOR NEGATIVE: Primary | ICD-10-CM

## 2023-06-16 DIAGNOSIS — C79.51 MALIGNANT NEOPLASM METASTATIC TO BONE: ICD-10-CM

## 2023-06-16 DIAGNOSIS — B37.9 CANDIDIASIS: ICD-10-CM

## 2023-06-16 DIAGNOSIS — R74.8 ELEVATED ALKALINE PHOSPHATASE LEVEL: ICD-10-CM

## 2023-06-16 PROCEDURE — 3075F SYST BP GE 130 - 139MM HG: CPT | Mod: CPTII,S$GLB,, | Performed by: NURSE PRACTITIONER

## 2023-06-16 PROCEDURE — 3075F PR MOST RECENT SYSTOLIC BLOOD PRESS GE 130-139MM HG: ICD-10-PCS | Mod: CPTII,S$GLB,, | Performed by: NURSE PRACTITIONER

## 2023-06-16 PROCEDURE — 3044F HG A1C LEVEL LT 7.0%: CPT | Mod: CPTII,S$GLB,, | Performed by: NURSE PRACTITIONER

## 2023-06-16 PROCEDURE — 4010F ACE/ARB THERAPY RXD/TAKEN: CPT | Mod: CPTII,S$GLB,, | Performed by: NURSE PRACTITIONER

## 2023-06-16 PROCEDURE — 1111F PR DISCHARGE MEDS RECONCILED W/ CURRENT OUTPATIENT MED LIST: ICD-10-PCS | Mod: CPTII,S$GLB,, | Performed by: NURSE PRACTITIONER

## 2023-06-16 PROCEDURE — 3008F BODY MASS INDEX DOCD: CPT | Mod: CPTII,S$GLB,, | Performed by: NURSE PRACTITIONER

## 2023-06-16 PROCEDURE — 3078F PR MOST RECENT DIASTOLIC BLOOD PRESSURE < 80 MM HG: ICD-10-PCS | Mod: CPTII,S$GLB,, | Performed by: NURSE PRACTITIONER

## 2023-06-16 PROCEDURE — 3044F PR MOST RECENT HEMOGLOBIN A1C LEVEL <7.0%: ICD-10-PCS | Mod: CPTII,S$GLB,, | Performed by: NURSE PRACTITIONER

## 2023-06-16 PROCEDURE — 4010F PR ACE/ARB THEARPY RXD/TAKEN: ICD-10-PCS | Mod: CPTII,S$GLB,, | Performed by: NURSE PRACTITIONER

## 2023-06-16 PROCEDURE — 99999 PR PBB SHADOW E&M-EST. PATIENT-LVL IV: CPT | Mod: PBBFAC,,, | Performed by: NURSE PRACTITIONER

## 2023-06-16 PROCEDURE — 99215 OFFICE O/P EST HI 40 MIN: CPT | Mod: S$GLB,,, | Performed by: NURSE PRACTITIONER

## 2023-06-16 PROCEDURE — 3008F PR BODY MASS INDEX (BMI) DOCUMENTED: ICD-10-PCS | Mod: CPTII,S$GLB,, | Performed by: NURSE PRACTITIONER

## 2023-06-16 PROCEDURE — 1159F MED LIST DOCD IN RCRD: CPT | Mod: CPTII,S$GLB,, | Performed by: NURSE PRACTITIONER

## 2023-06-16 PROCEDURE — 1159F PR MEDICATION LIST DOCUMENTED IN MEDICAL RECORD: ICD-10-PCS | Mod: CPTII,S$GLB,, | Performed by: NURSE PRACTITIONER

## 2023-06-16 PROCEDURE — 1111F DSCHRG MED/CURRENT MED MERGE: CPT | Mod: CPTII,S$GLB,, | Performed by: NURSE PRACTITIONER

## 2023-06-16 PROCEDURE — 99215 PR OFFICE/OUTPT VISIT, EST, LEVL V, 40-54 MIN: ICD-10-PCS | Mod: S$GLB,,, | Performed by: NURSE PRACTITIONER

## 2023-06-16 PROCEDURE — 99999 PR PBB SHADOW E&M-EST. PATIENT-LVL IV: ICD-10-PCS | Mod: PBBFAC,,, | Performed by: NURSE PRACTITIONER

## 2023-06-16 PROCEDURE — 3078F DIAST BP <80 MM HG: CPT | Mod: CPTII,S$GLB,, | Performed by: NURSE PRACTITIONER

## 2023-06-16 RX ORDER — NYSTATIN 100000 [USP'U]/ML
4 SUSPENSION ORAL 4 TIMES DAILY
Qty: 160 ML | Refills: 0 | Status: SHIPPED | OUTPATIENT
Start: 2023-06-16 | End: 2023-06-26

## 2023-06-16 NOTE — PROGRESS NOTES
Subjective     Patient ID: Paul Li Jr. is a 47 y.o. male.    Chief Complaint: Malignant neoplasm involving both nipple and areola of righ    HPI    Presents for follow up and C2D8 Sacituzumab govitecan-hziy (Trodelvy)   Received C1D1 on 2023. Of note, he did not receive day 8    Overall feels well   Has noted more nausea but relieved with zofran.   Palliative suggested he start zyprexa last night but he did not.   Occasional funny taste in mouth but lasting only briefly.   No new pain issues  No CP/SOB  No congestion.   Cough better  Swelling to legs no worse. Wearing hose.   No fever/chills.          Diagnosis:  Metastatic TNBC progressed (prior Stage IB ([id number]) triple negative invasive ductal carcinoma with lobular features of right breast, retroareolar, Grade 2, Ki67 80%, diagnosed 2019)     Oncology History:  Metastatic  Set up for scans (due to back pain) which were consistent for recurrence.   Imagin/3/2021 MRI T/L spine:  FINDINGS:  Alignment: Within normal limits.  Vertebrae: Low T1 signal intensity in the left T12 vertebral body extending to the left pedicle, S1 and S2 vertebral bodies with abnormal enhancement.  The vertebral heights are well maintained.  No evidence of acute fractures.  Abnormal appearance of the LEFT sacrum and ilium as well.  Discs: Degenerative disease of the level of L4-L5 with posterior annular fissure.  Conus appears within normal limits terminating at the level of the L2. level.  Cauda equina appears unremarkable.  Mild circumferential disc bulging at the level of T10-T11 abutting the ventral thecal sac.  No significant spinal canal stenosis or neural foraminal narrowing throughout the thoracic spine.  No significant spinal canal stenosis or neural foraminal narrowing throughout the lumbar spine.  Paraspinous muscles and soft tissues: Subcentimeter focal enhancement in the posterior column along the supraspinous ligament at the level of L1-L2 (sagittal series 21,  image 10) potentially inflammatory versus neoplastic.  Impression:  Abnormal appearance of the T12, S1, S2 vertebral bodies as well as LEFT sacrum and ilium concerning for metastatic disease in this patient with history of breast cancer.  No evidence for significant central canal narrowing.  Additional findings, as above.  This report was flagged in Epic as abnormal.     6/9/2021 PET scan:  COMPARISON:  MRI thoracic and lumbar spine 06/03/2021  FINDINGS:  Quality of the study: Adequate.  In the head and neck, there are no hypermetabolic lesions worrisome for malignancy. There are no hypermetabolic mucosal lesions, and there are no pathologically enlarged or hypermetabolic lymph nodes.  In the chest, there is postoperative change of right mastectomy/right axillary lymph node dissection.  There is low level hypermetabolic activity with mild soft tissue thickening in the skin/superficial subcutaneous fat of the right chest wall (axial fused image 78) with SUV max 3.6.  There is soft tissue thickening measuring approximately 1.8 x 7.9 cm in the subcutaneous fat of the right chest wall (axial series 3, image 84) with SUV max 3.1.  There are a few bilateral pulmonary nodules measuring up to 0.3 cm in the left lung (axial series 3, image 88) and 0.5 cm in the right lung (axial series 3, image 84).  These nodules are too small to characterize by PET.  There are no pathologically enlarged or hypermetabolic lymph nodes.  In the abdomen and pelvis, there is physiologic tracer distribution within the abdominal organs and excretion into the genitourinary system.  Subtle sen mesentery and multiple prominent mesenteric lymph nodes measuring up to 1.8 cm in long axis with background activity.  In the bones, there are several hypermetabolic lesions worrisome for malignancy.  Sclerotic lesion in the left T12 vertebral body (axial series 3, image 131) with SUV max 12.  Sclerotic lesions in the sacrum (for example axial series 3,  image 188) with SUV max 9.7.  Sclerotic lesions in the left iliac bone (for example axial series 3, image 205) with SUV max 6.  In the extremities, there are no hypermetabolic lesions worrisome for malignancy.  Incidental findings:  Bilateral maxillary antra mucous retention cysts.  Fat containing right inguinal hernia.  Impression:  1. In this patient with right breast carcinoma status post mastectomy/right axillary lymph node dissection, there are multiple sclerotic lesions in the T12 vertebral body, sacrum, and left iliac bone with hypermetabolic activity concerning for active metastatic disease and in keeping with findings on MRI 06/03/2021.  2. Additionally there is low-level hypermetabolic activity with soft tissue thickening in the right chest wall as detailed above.  These findings are nonspecific and may be related to postoperative/post radiation change, with recurrent malignancy not excluded.  3. Nonspecific mesenteric haziness and lymph nodes which may indicate mesenteric adenitis.  Recommend clinical correlation and attention on follow-up.  4. Additional findings as above.  I, Sohan Tillman MD, attest that I reviewed and interpreted the images.     In summary,   Started aplelisib 8/22/2021   Abraxane C1 started 8/13/2021  We had sent Guardant and discussed results with Molecular tumor board  He also had a repeat bx to confirm metastatic triple negative breast cancer  Plan:   Nab-Paclitaxel and Alpelisib  Reference: https://ascopubs.org/doi/abs/10.1200/JCO.[email or web address]_[email or web address]  Also on Zometa-      - C1D1 Abraxane 8/13/2021  - Started aplelisib 8/22/2021               Oncology History   Malignant neoplasm involving both nipple and areola of right breast in male, estrogen receptor negative   5/1/2019 Imaging Significant Findings     Mammogram and US  Impression:  Right  Mass: Right breast 14 mm mass at the retroareolar anterior position. Assessment: 4 - Suspicious finding. Biopsy is recommended.    Left  There is no mammographic or sonographic evidence of malignancy.  Recommendation:  Biopsy is recommended.      5/2/2019 Biopsy     BREAST, RIGHT, RETROAREOLAR MASS, ULTRASOUND-GUIDED BIOPSY:  Invasive ductal carcinoma with lobular features.  Size of invasive carcinoma: 5 MM in greatest linear dimension within a single      5/2/2019 Breast Tumor Markers     Estrogen: Negative  Progesterone: Negative  HER2: Negative  Ki67: 80      5/17/2019 Cancer Staged     Staging form: Breast, AJCC 8th Edition  - Clinical stage from 5/17/2019: Stage IB (cT1c, cN0, cM0, G2, ER-, MD-, HER2-) - Signed by Richa Bahena MD on 5/17/2019 5/27/2019 - 7/21/2019 Chemotherapy     Treatment Summary   Plan Name: OP BREAST DOSE-DENSE AC - DOXORUBICIN CYCLOPHOSPHAMIDE Q2W  Treatment Goal: Curative  Status: Inactive  Start Date: 5/27/2019  End Date: 7/9/2019  Provider: Richa Bahena MD  Chemotherapy: DOXOrubicin chemo injection 186 mg, 60 mg/m2 = 186 mg, Intravenous, Clinic/HOD 1 time, 4 of 4 cycles  Administration: 186 mg (5/27/2019), 186 mg (6/10/2019), 186 mg (6/24/2019), 186 mg (7/8/2019)  cyclophosphamide (CYTOXAN) 600 mg/m2 = 1,865 mg in sodium chloride 0.9% 250 mL chemo infusion, 600 mg/m2 = 1,865 mg, Intravenous, Clinic/HOD 1 time, 4 of 4 cycles  Administration: 1,865 mg (5/27/2019), 1,865 mg (6/10/2019), 1,865 mg (6/24/2019), 1,865 mg (7/8/2019)      7/22/2019 - 10/15/2019 Chemotherapy     Treatment Summary   Plan Name: OP PACLITAXEL QW  Treatment Goal: Curative  Status: Inactive  Start Date: 7/24/2019  End Date: 10/8/2019  Provider: Richa Bahena MD  Chemotherapy: PACLitaxel (TAXOL) 80 mg/m2 = 246 mg in sodium chloride 0.9% 250 mL chemo infusion, 80 mg/m2 = 246 mg, Intravenous, Clinic/HOD 1 time, 12 of 12 cycles  Dose modification: 60 mg/m2 (original dose 80 mg/m2, Cycle 3), 55 mg/m2 (original dose 80 mg/m2, Cycle 7), 60 mg/m2 (original dose 80 mg/m2, Cycle 7), 50 mg/m2 (original dose 80 mg/m2, Cycle 9), 50 mg/m2  (original dose 80 mg/m2, Cycle 10)  Administration: 246 mg (7/24/2019), 246 mg (7/31/2019), 186 mg (8/7/2019), 186 mg (8/14/2019), 186 mg (8/21/2019), 186 mg (8/28/2019), 186 mg (9/4/2019), 174 mg (9/11/2019), 156 mg (9/18/2019), 156 mg (9/25/2019), 156 mg (10/1/2019), 156 mg (10/8/2019)      11/22/2019 Breast Surgery     On 11/22/19 Dr whyte performed a right breast mastectomy with SLN biopsy with pathology showing grade 2, 6 mm IDC with extensive treatment effect, no LVI with 1 LN positive 4 mm, negative margins. The on 12/17/19, Dr Whyte performed right axillary dissection with pathology still pending.      11/22/2019 Cancer Staged     ypT1b N1      12/17/2019 Breast Surgery     Surgery: Dr Shima Whyte, [phone] performed right ALND with pathology showing 14 negative lymph nodes.             1/14/2020 Tumor Conference      Post mastectomy radiation therapy: Xeloda, then possible Keytruda trial .      2/3/2020 - 3/23/2020 Radiation Therapy     Treating physician: Speedy Nair MD   Total Dose: 50.4 Gy to the chest wall and regional lymphatics  10 Gy boost to the prostate.   Fractions: 28 fractions at 1.8 Gy per fraction  5 fractions at 2 Gy per fraction       2/28/2020 -  Chemotherapy     * 4/15/2020 - 9/28/20 completed 6 cycles adjuvant Xeloda 1000 mg/m2 bid days 1-14 every 21 days  Treatment Summary   Plan Name: OP CAPECITABINE 1250MG/M2 BID Q3W  Treatment Goal: Curative  Status: Active  Start Date: 3/20/2020  End Date: 3/20/2020  Provider: Richa Bahena MD  Chemotherapy: [No matching medication found in this treatment plan]            Hydrocele repaired.       - 3/29/2023 MRI sacrum/coccyx:  FINDINGS:  There is abnormal T1 hypointense signal throughout the sacrum as well as the visualized left hemipelvis.  Multiple discrete foci also noted within the right partially visualized iliac bone.  These are accompanied by heterogeneous T2 signal as well as contrast enhancement and in keeping with  osseous metastatic disease.  No evidence for epidural extension into the sacral canal or involvement of the sacral foramina.  No visualized soft tissue lesions.  No fracture.  Nonspecific mild soft tissue edema noted within the bilateral gluteal minimus, piriformis and left iliacus muscles.  Mild degenerative changes are noted in the sacroiliac joints.  No pelvic ascites or lymphadenopathy seen.  Visualized sciatic nerves are unremarkable.  Impression:  Extensive osseous metastatic disease of the pelvis involving much of the sacrum.  No evidence for invasion of the sacral canal or neural foramina.    - MRI L-spine:  FINDINGS:  Alignment: Normal.  Vertebrae:  Numerous T1 hypointense, STIR hyperintense, enhancing lesions are seen throughout the lumbar spine..  No fracture.  No epidural extension.  Discs: Disc desiccation and mild height loss of L4-5.  Posterior annular fissure of L4-5.  Remaining discs appear normal in height and signal.  No evidence for discitis.  Cord: Normal.  Conus terminates at L2  Degenerative findings:  T12-L1: No spinal canal stenosis or neural foraminal narrowing.  L1-L2: No spinal canal stenosis or neural foraminal narrowing.  L2-L3: Mild facet arthropathy.  No spinal canal stenosis or neural foraminal narrowing.  L3-L4: Mild facet arthropathy.  No spinal canal stenosis or neural foraminal narrowing.  L4-L5: Circumferential disc bulge and moderate facet arthropathy.  No spinal canal stenosis or neural foraminal narrowing.  L5-S1: Mild facet arthropathy.  No spinal canal stenosis or neural foraminal narrowing.  Paraspinal muscles & soft tissues: Mild paraspinal muscle atrophy.  Impression:  Redemonstration of known diffuse osseous metastatic disease throughout the lumbar spine.  When compared to recent PET-CT, findings appear stable but progressed from prior MRI on 06/03/2021.  No fracture. No epidural extension.     - 3/20/2023 PET scan:  FINDINGS:  Quality of the study: Adequate.  In the  head and neck, there are no hypermetabolic lesions worrisome for malignancy. There are no hypermetabolic mucosal lesions, and there are no pathologically enlarged or hypermetabolic lymph nodes.  In the chest, there is postoperative change of right mastectomy and right axillary lymph node dissection.  There is a new 1.1 cm right hilar lymph node (3-101) with max SUV 6.3 (603-99).  There is a right lower lobe pulmonary nodule measuring 0.9 cm, which does not show increased tracer uptake, though has been gradually enlarging (measured 0.3 cm on 06/09/2021) (3-106).  In the abdomen and pelvis, there is physiologic tracer distribution within the abdominal organs and excretion into the genitourinary system.  In the bones, there are numerous sclerotic lesions, with few index lesions detailed below:  Sclerotic lesion in T7 with tracer uptake mildly increased prior exam with SUV max 5.8 (previously 5.2) (3-107) (603-109).  T12 sclerotic focus demonstrates an SUV max of 5.9 (previously 4.8) (3-153, 603-153).  L5 vertebral body sclerotic lesion with an SUV max of 6.8 (previously 8.7) (3-213).  Newly hypermetabolic focus at the inferior aspect of the left scapula with SUV max of 7.4 (603-103).  Increased size and uptake of a left ischial tuberosity sclerotic lesion with SUV max of 7.3 (3-266).  Increased size of a L4 vertebral body sclerotic lesion (3-200).  Extensive sclerotic change through the pelvic bones grossly stable compared to the prior exam.  CT: Right maxillary mucosal retention cysts.  Left chest wall Port-A-Cath.  Postoperative change of right mastectomy with right axillary lymph node dissection.  Small bilateral fat containing inguinal hernias.  Impression:  In this patient with breast cancer status post right mastectomy and right axillary lymph node dissection there has been progression of disease.  Newly tracer avid right hilar lymph node consistent with derick metastasis.  In the bones there are numerous  sclerotic lesions, some of which demonstrate new abnormal uptake on FDG PET.  Suspicious right lower lobe pulmonary nodule does not show increase tracer uptake, though it is been gradually enlarging, and attention on follow-up is advised.  This report was flagged in Epic as abnormal.    Started Trodelvy 5/19/2023.   Note- did not receive day 8 with cycle 1.       Review of Systems   Constitutional:         See Above   All other systems reviewed and are negative.       Objective     Physical Exam  Vitals and nursing note reviewed.   Constitutional:       General: He is not in acute distress.     Appearance: Normal appearance. He is well-developed.      Comments: Presents with spouse  Weight loss noted   HENT:      Head: Normocephalic and atraumatic.      Nose: Nose normal.      Mouth/Throat:      Mouth: Mucous membranes are dry.      Comments: Questionable white patch  Eyes:      General: Lids are normal. No scleral icterus.     Conjunctiva/sclera: Conjunctivae normal.      Pupils: Pupils are equal, round, and reactive to light.   Neck:      Thyroid: No thyromegaly.      Vascular: No JVD.      Trachea: Trachea normal.   Cardiovascular:      Rate and Rhythm: Normal rate and regular rhythm.      Pulses: Normal pulses.      Heart sounds: Normal heart sounds. No murmur heard.  Pulmonary:      Effort: Pulmonary effort is normal.      Breath sounds: No wheezing or rales.   Abdominal:      General: Bowel sounds are normal. There is no distension.      Palpations: Abdomen is soft. There is no splenomegaly or mass.      Tenderness: There is no abdominal tenderness.      Comments: No organomegaly.    Musculoskeletal:         General: Swelling (RUE - chronic) present. Normal range of motion.      Cervical back: Normal range of motion and neck supple.      Right lower leg: Edema (trace) present.      Left lower leg: Edema (trace) present.      Comments: No spinal or paraspinal tenderness.    Lymphadenopathy:      Head:       Right side of head: No submental or submandibular adenopathy.      Left side of head: No submental or submandibular adenopathy.      Cervical: No cervical adenopathy.      Upper Body:      Right upper body: No supraclavicular or axillary adenopathy.      Left upper body: No supraclavicular or axillary adenopathy.   Skin:     General: Skin is warm and dry.      Capillary Refill: Capillary refill takes less than 2 seconds.      Findings: No bruising or rash.      Nails: There is no clubbing.   Neurological:      Mental Status: He is alert and oriented to person, place, and time.      Deep Tendon Reflexes: Reflexes are normal and symmetric.   Psychiatric:         Speech: Speech normal.         Behavior: Behavior normal.     Labs: most recent reviewed  Imaging: reviewed     Assessment and Plan     1. Malignant neoplasm involving both nipple and areola of right breast in male, estrogen receptor negative        2. Bone metastases        3. Chemotherapy-induced neutropenia        4. Anemia associated with chemotherapy        5. Candidiasis  nystatin (MYCOSTATIN) 100,000 unit/mL suspension      6. Neoplasm related pain        7. Elevated alkaline phosphatase level        8. Hypertension associated with diabetes                Overall doing well with improvement in chest congestion and cough from recent rhinovirus episode.   Labs today as needs prior to chemo.   Ok to proceed with C2D8 Trodelvy if labs adequate. **see below- need to hold  Dose adjusted for weight previously.   Zometa due as well- Gets every 3 months. Opts to not get today as has plans.   Will scan before cycle 3 (need new baseline)   Monitor alk phos  Continue pain regimen as controlling neoplasm related pain.    Nutrition consult as scheduled as taste changes with new treatment.   Rx Nystatin prn  Encouraged hydration      DM, HTN- controlled      Addendum:   CBC reviewed . Hold day 8 troldelvy  Neutropenic precautions.   Patient refusing zometa  today.   May Dose reduce in 2 weeks- see below.   Severe neutropenia (Grade 4 neutropenia of 7 days or longer; Grade 3 or 4 febrile neutropenia; at time of scheduled treatment, Grade 3 or 4 neutropenia which delays dosing by 2 or 3 weeks for recovery to Grade 1 or less): First occurrence, reduce dose 25% and give granulocyte colony stimulating factor (G-CSF); second occurrence, reduce dose 50% and give G-CSF; third occurrence, discontinue treatment and give G-CSF; do not re-escalate the dose after a dose reduction for an adverse event has been made       Route Chart for Scheduling    Med Onc Chart Routing      Follow up with physician . Appts adequate   Follow up with J[facility]    Infusion scheduling note    Injection scheduling note    Labs    Imaging    Pharmacy appointment    Other referrals          Treatment Plan Information   OP SACITUZUMAB GOVITECAN-HZIY Q3W   Rosa Linn MD   Upcoming Treatment Dates - OP SACITUZUMAB GOVITECAN-HZIY Q3W    6/16/2023       Pre-Medications       acetaminophen tablet 650 mg       diphenhydrAMINE (BENADRYL) 25 mg in sodium chloride 0.9% 50 mL IVPB       famotidine (PF) injection 20 mg       Chemotherapy       sacituzumab govitecan-hziy (TRODELVY) 1,480 mg in sodium chloride 0.9% 500 mL chemo infusion       Supportive Care       atropine injection 0.4 mg       Antiemetics       aprepitant (CINVANTI) injection 130 mg       palonosetron (ALOXI) 0.25 mg with Dexamethasone (DECADRON) 12 mg in NS 50 mL IVPB  6/30/2023       Pre-Medications       acetaminophen tablet 650 mg       diphenhydrAMINE (BENADRYL) 25 mg in sodium chloride 0.9% 50 mL IVPB       famotidine (PF) injection 20 mg       Chemotherapy       sacituzumab govitecan-hziy (TRODELVY) 1,480 mg in sodium chloride 0.9% 500 mL chemo infusion       Supportive Care       atropine injection 0.4 mg       Antiemetics       aprepitant (CINVANTI) injection 130 mg       palonosetron (ALOXI) 0.25 mg with Dexamethasone (DECADRON) 12 mg in NS  50 mL IVPB  7/7/2023       Pre-Medications       acetaminophen tablet 650 mg       diphenhydrAMINE (BENADRYL) 25 mg in sodium chloride 0.9% 50 mL IVPB       famotidine (PF) injection 20 mg       Chemotherapy       sacituzumab govitecan-hziy (TRODELVY) 1,480 mg in sodium chloride 0.9% 500 mL chemo infusion       Supportive Care       atropine injection 0.4 mg       Antiemetics       aprepitant (CINVANTI) injection 130 mg       palonosetron (ALOXI) 0.25 mg with Dexamethasone (DECADRON) 12 mg in NS 50 mL IVPB  7/21/2023       Pre-Medications       acetaminophen tablet 650 mg       diphenhydrAMINE (BENADRYL) 25 mg in sodium chloride 0.9% 50 mL IVPB       famotidine (PF) injection 20 mg       Chemotherapy       sacituzumab govitecan-hziy (TRODELVY) 1,480 mg in sodium chloride 0.9% 500 mL chemo infusion       Supportive Care       atropine injection 0.4 mg       Antiemetics       aprepitant (CINVANTI) injection 130 mg       palonosetron (ALOXI) 0.25 mg with Dexamethasone (DECADRON) 12 mg in NS 50 mL IVPB    Supportive Plan Information  OP FILGRASTIM 480 MCG   Michelle Grayson NP   Upcoming Treatment Dates - OP FILGRASTIM 480 MCG    No upcoming days in selected categories.    Therapy Plan Information  PORT FLUSH  Flushes  heparin, porcine (PF) 100 unit/mL injection flush 500 Units  500 Units, Intravenous, Every visit  sodium chloride 0.9% flush 10 mL  10 mL, Intravenous, Every visit    ZOLEDRONIC ACID (ZOMETA) IV  Medications  zoledronic 4 mg/100 mL infusion 4 mg  4 mg, Intravenous, Every 4 weeks  Flushes  sodium chloride 0.9% 250 mL flush bag  Intravenous, Every 4 weeks  sodium chloride 0.9% flush 10 mL  10 mL, Intravenous, Every 4 weeks  heparin, porcine (PF) 100 unit/mL injection flush 500 Units  500 Units, Intravenous, Every 4 weeks  alteplase injection 2 mg  2 mg, Intra-Catheter, Every 4 weeks    Patient is in agreement with the proposed treatment plan. All questions were answered to the patient's satisfaction. Pt  knows to call clinic for any new or worsening symptoms and if anything is needed before the next clinic visit.      OZIEL Jefferson-AILYN  Hematology & Oncology  [facility] [place] [address]  ph. [phone]  Fax. [phone]       30 minutes of total time spent on the encounter, which includes face to face time and non-face to face time preparing to see the patient (eg, review of tests), Obtaining and/or reviewing separately obtained history, Documenting clinical information in the electronic or other health record, Independently interpreting results (not separately reported) and communicating results to the patient/family/caregiver, or Care coordination (not separately reported).

## 2023-06-19 NOTE — PROGRESS NOTES
"Received call from Shirlene Herrera re: patient who is in clinic today and stated the need for nutritional supplements. Called patient and discussed with him.   Sent a message via Epic In [place] to BOSTON Currie requesting recommendations - "Boost Glucose Control, 2-3/day if he is willing to. Looks like the [place] dietitian sees him virtually on Monday which is good too."   Called patient back re: the recommendations and provided him with 3 cases of Boost Glucose Control vanilla flavor. Patient stated appreciation. Will continue to follow and assist as needs are identified.   "

## 2023-06-22 ENCOUNTER — HOSPITAL ENCOUNTER (OUTPATIENT)
Dept: RADIOLOGY | Facility: HOSPITAL | Age: 48
Discharge: HOME | End: 2023-06-22
Attending: NURSE PRACTITIONER
Payer: MEDICARE

## 2023-06-22 DIAGNOSIS — Z17.1 MALIGNANT NEOPLASM INVOLVING BOTH NIPPLE AND AREOLA OF RIGHT BREAST IN MALE, ESTROGEN RECEPTOR NEGATIVE: ICD-10-CM

## 2023-06-22 DIAGNOSIS — C79.51 MALIGNANT NEOPLASM METASTATIC TO BONE: ICD-10-CM

## 2023-06-22 DIAGNOSIS — C50.021 MALIGNANT NEOPLASM INVOLVING BOTH NIPPLE AND AREOLA OF RIGHT BREAST IN MALE, ESTROGEN RECEPTOR NEGATIVE: ICD-10-CM

## 2023-06-22 DIAGNOSIS — G89.3 NEOPLASM RELATED PAIN: ICD-10-CM

## 2023-06-22 LAB — POCT GLUCOSE: 90 MG/DL (ref 70–110)

## 2023-06-22 PROCEDURE — 78815 PET IMAGE W/CT SKULL-THIGH: CPT | Mod: 26,PS,, | Performed by: STUDENT IN AN ORGANIZED HEALTH CARE EDUCATION/TRAINING PROGRAM

## 2023-06-22 PROCEDURE — 78815 NM PET CT ROUTINE: ICD-10-PCS | Mod: 26,PS,, | Performed by: STUDENT IN AN ORGANIZED HEALTH CARE EDUCATION/TRAINING PROGRAM

## 2023-06-22 PROCEDURE — A9552 F18 FDG: HCPCS

## 2023-06-26 ENCOUNTER — LAB VISIT (OUTPATIENT)
Dept: LAB | Facility: HOSPITAL | Age: 48
End: 2023-06-26
Attending: INTERNAL MEDICINE
Payer: MEDICARE

## 2023-06-26 ENCOUNTER — PATIENT MESSAGE (OUTPATIENT)
Dept: REHABILITATION | Facility: HOSPITAL | Age: 48
End: 2023-06-26
Payer: MEDICARE

## 2023-06-26 DIAGNOSIS — C50.021 MALIGNANT NEOPLASM OF NIPPLE OR AREOLA OF MALE BREAST, RIGHT: Primary | ICD-10-CM

## 2023-06-26 DIAGNOSIS — C50.021 MALIGNANT NEOPLASM OF NIPPLE OR AREOLA OF MALE BREAST, RIGHT: ICD-10-CM

## 2023-06-26 PROCEDURE — 88363 XM ARCHIVE TISSUE MOLEC ANAL: CPT | Performed by: PATHOLOGY

## 2023-06-26 PROCEDURE — 88360 TUMOR IMMUNOHISTOCHEM/MANUAL: CPT | Performed by: PATHOLOGY

## 2023-06-26 PROCEDURE — 88363 XM ARCHIVE TISSUE MOLEC ANAL: CPT | Mod: ,,, | Performed by: PATHOLOGY

## 2023-06-26 PROCEDURE — 88363 PR  EXAM & SELECT ARCHIVE TISSUE MOLECULAR ANALYSIS: ICD-10-PCS | Mod: ,,, | Performed by: PATHOLOGY

## 2023-06-28 NOTE — PROGRESS NOTES
Subjective     Patient ID: Paul Li Jr. is a 47 y.o. male.    Chief Complaint: Malignant neoplasm involving both nipple and areola of righ    HPI    Completed C1 and D1 of C2 - here for C2D8  Since last Thursday night  Developed pain in mid spine came on acutely and needed help getting in and out of bed  Constant pain 8-10/10 and required 3 Norco to control  Added Ibuprofen the last 2 days and finally noted some relief and is at 6/10    - 6/22/2023 PET scan:  FINDINGS:  Quality of the study: Adequate.  In the head and neck, there are no hypermetabolic lesions worrisome for malignancy. There are no hypermetabolic mucosal lesions, and there are no pathologically enlarged or hypermetabolic lymph nodes.  In the chest, postoperative changes of right mastectomy and right axillary lymph node dissection, with the right breast scar tissue demonstrating background tracer avidity.  Worsening mediastinal/hilar lymphadenopathy, with the subcarinal/right hilar lymph node measuring approximately 1.5 cm with SUV max 10 (series 3, image 78), previously 1.1 cm with SUV max 6.3.  Stable right lower lobe pulmonary nodule measuring 0.9 cm (series 3, image 91), previously 0.9 cm.  In the abdomen and pelvis, there is physiologic tracer distribution within the abdominal organs and excretion into the genitourinary system.  In the bones, there is overall worsening sclerosis and hypermetabolism throughout the skeleton.  Updated index lesions:  *Left anterior 5th rib with SUV max 7.4 (series 3, image 95), previously SUV max 3.1.  *T8 vertebral body demonstrates SUV max 11 (series 3, image 97), previously SUV max 3.8.  *L5 vertebral body demonstrates SUV max 9.2 (series 3, image 196), previously SUV max 7.2.  *Right anterior iliac crests with SUV max 7.2 (series 3, image 207), previously SUV max 2.5.  Increased uptake within the right acromioclavicular joint, favored to represent degenerative changes.  In the extremities, there are no  hypermetabolic lesions worrisome for malignancy.  Incidental CT findings: Mucosal thickening of the maxillary sinuses.  Left chest wall port with the catheter tip terminating in the lower SVC.  Impression:  Findings suggestive of disease progression, noting worsening osseous metastatic disease and subcarinal/right hilar lymphadenopathy.  This report was flagged in Epic as abnormal.    Presents for follow up and C2D8 Sacituzumab govitecan-chey (Trodelvy)   Received C1D1 on 2023. Of note, he did not receive day 8     Overall feels well   Has noted more nausea but relieved with zofran.   Palliative suggested he start zyprexa last night but he did not.   Occasional funny taste in mouth but lasting only briefly.   No new pain issues  No CP/SOB  No congestion.   Cough better  Swelling to legs no worse. Wearing hose.   No fever/chills.      Diagnosis:  Metastatic TNBC progressed (prior Stage IB ([id number]) triple negative invasive ductal carcinoma with lobular features of right breast, retroareolar, Grade 2, Ki67 80%, diagnosed 2019)     Oncology History:  Metastatic  Set up for scans (due to back pain) which were consistent for recurrence.   Imagin/3/2021 MRI T/L spine:  FINDINGS:  Alignment: Within normal limits.  Vertebrae: Low T1 signal intensity in the left T12 vertebral body extending to the left pedicle, S1 and S2 vertebral bodies with abnormal enhancement.  The vertebral heights are well maintained.  No evidence of acute fractures.  Abnormal appearance of the LEFT sacrum and ilium as well.  Discs: Degenerative disease of the level of L4-L5 with posterior annular fissure.  Conus appears within normal limits terminating at the level of the L2. level.  Cauda equina appears unremarkable.  Mild circumferential disc bulging at the level of T10-T11 abutting the ventral thecal sac.  No significant spinal canal stenosis or neural foraminal narrowing throughout the thoracic spine.  No significant spinal canal  stenosis or neural foraminal narrowing throughout the lumbar spine.  Paraspinous muscles and soft tissues: Subcentimeter focal enhancement in the posterior column along the supraspinous ligament at the level of L1-L2 (sagittal series 21, image 10) potentially inflammatory versus neoplastic.  Impression:  Abnormal appearance of the T12, S1, S2 vertebral bodies as well as LEFT sacrum and ilium concerning for metastatic disease in this patient with history of breast cancer.  No evidence for significant central canal narrowing.  Additional findings, as above.  This report was flagged in Epic as abnormal.     6/9/2021 PET scan:  COMPARISON:  MRI thoracic and lumbar spine 06/03/2021  FINDINGS:  Quality of the study: Adequate.  In the head and neck, there are no hypermetabolic lesions worrisome for malignancy. There are no hypermetabolic mucosal lesions, and there are no pathologically enlarged or hypermetabolic lymph nodes.  In the chest, there is postoperative change of right mastectomy/right axillary lymph node dissection.  There is low level hypermetabolic activity with mild soft tissue thickening in the skin/superficial subcutaneous fat of the right chest wall (axial fused image 78) with SUV max 3.6.  There is soft tissue thickening measuring approximately 1.8 x 7.9 cm in the subcutaneous fat of the right chest wall (axial series 3, image 84) with SUV max 3.1.  There are a few bilateral pulmonary nodules measuring up to 0.3 cm in the left lung (axial series 3, image 88) and 0.5 cm in the right lung (axial series 3, image 84).  These nodules are too small to characterize by PET.  There are no pathologically enlarged or hypermetabolic lymph nodes.  In the abdomen and pelvis, there is physiologic tracer distribution within the abdominal organs and excretion into the genitourinary system.  Subtle sen mesentery and multiple prominent mesenteric lymph nodes measuring up to 1.8 cm in long axis with background activity.  In  the bones, there are several hypermetabolic lesions worrisome for malignancy.  Sclerotic lesion in the left T12 vertebral body (axial series 3, image 131) with SUV max 12.  Sclerotic lesions in the sacrum (for example axial series 3, image 188) with SUV max 9.7.  Sclerotic lesions in the left iliac bone (for example axial series 3, image 205) with SUV max 6.  In the extremities, there are no hypermetabolic lesions worrisome for malignancy.  Incidental findings:  Bilateral maxillary antra mucous retention cysts.  Fat containing right inguinal hernia.  Impression:  1. In this patient with right breast carcinoma status post mastectomy/right axillary lymph node dissection, there are multiple sclerotic lesions in the T12 vertebral body, sacrum, and left iliac bone with hypermetabolic activity concerning for active metastatic disease and in keeping with findings on MRI 06/03/2021.  2. Additionally there is low-level hypermetabolic activity with soft tissue thickening in the right chest wall as detailed above.  These findings are nonspecific and may be related to postoperative/post radiation change, with recurrent malignancy not excluded.  3. Nonspecific mesenteric haziness and lymph nodes which may indicate mesenteric adenitis.  Recommend clinical correlation and attention on follow-up.  4. Additional findings as above.  I, Sohan Tillman MD, attest that I reviewed and interpreted the images.     In summary,   Started aplelisib 8/22/2021   Abraxane C1 started 8/13/2021  We had sent Guardant and discussed results with Molecular tumor board  He also had a repeat bx to confirm metastatic triple negative breast cancer  Plan:   Nab-Paclitaxel and Alpelisib  Reference: https://ascopubs.org/doi/abs/10.1200/JCO.[email or web address]  Also on Zometa-      - C1D1 Abraxane 8/13/2021  - Started aplelisib 8/22/2021               Oncology History   Malignant neoplasm involving both nipple and areola of right breast in male, estrogen  receptor negative   5/1/2019 Imaging Significant Findings     Mammogram and US  Impression:  Right  Mass: Right breast 14 mm mass at the retroareolar anterior position. Assessment: 4 - Suspicious finding. Biopsy is recommended.   Left  There is no mammographic or sonographic evidence of malignancy.  Recommendation:  Biopsy is recommended.      5/2/2019 Biopsy     BREAST, RIGHT, RETROAREOLAR MASS, ULTRASOUND-GUIDED BIOPSY:  Invasive ductal carcinoma with lobular features.  Size of invasive carcinoma: 5 MM in greatest linear dimension within a single      5/2/2019 Breast Tumor Markers     Estrogen: Negative  Progesterone: Negative  HER2: Negative  Ki67: 80      5/17/2019 Cancer Staged     Staging form: Breast, AJCC 8th Edition  - Clinical stage from 5/17/2019: Stage IB (cT1c, cN0, cM0, G2, ER-, NM-, HER2-) - Signed by Richa Bahena MD on 5/17/2019 5/27/2019 - 7/21/2019 Chemotherapy     Treatment Summary   Plan Name: OP BREAST DOSE-DENSE AC - DOXORUBICIN CYCLOPHOSPHAMIDE Q2W  Treatment Goal: Curative  Status: Inactive  Start Date: 5/27/2019  End Date: 7/9/2019  Provider: Richa Bahena MD  Chemotherapy: DOXOrubicin chemo injection 186 mg, 60 mg/m2 = 186 mg, Intravenous, Clinic/HOD 1 time, 4 of 4 cycles  Administration: 186 mg (5/27/2019), 186 mg (6/10/2019), 186 mg (6/24/2019), 186 mg (7/8/2019)  cyclophosphamide (CYTOXAN) 600 mg/m2 = 1,865 mg in sodium chloride 0.9% 250 mL chemo infusion, 600 mg/m2 = 1,865 mg, Intravenous, Clinic/HOD 1 time, 4 of 4 cycles  Administration: 1,865 mg (5/27/2019), 1,865 mg (6/10/2019), 1,865 mg (6/24/2019), 1,865 mg (7/8/2019)      7/22/2019 - 10/15/2019 Chemotherapy     Treatment Summary   Plan Name: OP PACLITAXEL QW  Treatment Goal: Curative  Status: Inactive  Start Date: 7/24/2019  End Date: 10/8/2019  Provider: Richa Bahena MD  Chemotherapy: PACLitaxel (TAXOL) 80 mg/m2 = 246 mg in sodium chloride 0.9% 250 mL chemo infusion, 80 mg/m2 = 246 mg, Intravenous, Clinic/HOD 1  time, 12 of 12 cycles  Dose modification: 60 mg/m2 (original dose 80 mg/m2, Cycle 3), 55 mg/m2 (original dose 80 mg/m2, Cycle 7), 60 mg/m2 (original dose 80 mg/m2, Cycle 7), 50 mg/m2 (original dose 80 mg/m2, Cycle 9), 50 mg/m2 (original dose 80 mg/m2, Cycle 10)  Administration: 246 mg (7/24/2019), 246 mg (7/31/2019), 186 mg (8/7/2019), 186 mg (8/14/2019), 186 mg (8/21/2019), 186 mg (8/28/2019), 186 mg (9/4/2019), 174 mg (9/11/2019), 156 mg (9/18/2019), 156 mg (9/25/2019), 156 mg (10/1/2019), 156 mg (10/8/2019)      11/22/2019 Breast Surgery     On 11/22/19 Dr whyte performed a right breast mastectomy with SLN biopsy with pathology showing grade 2, 6 mm IDC with extensive treatment effect, no LVI with 1 LN positive 4 mm, negative margins. The on 12/17/19, Dr Whyte performed right axillary dissection with pathology still pending.      11/22/2019 Cancer Staged     ypT1b N1      12/17/2019 Breast Surgery     Surgery: Dr Shima Whyte, [phone] performed right ALND with pathology showing 14 negative lymph nodes.             1/14/2020 Tumor Conference      Post mastectomy radiation therapy: Xeloda, then possible Keytruda trial .      2/3/2020 - 3/23/2020 Radiation Therapy     Treating physician: Speedy Nair MD   Total Dose: 50.4 Gy to the chest wall and regional lymphatics  10 Gy boost to the prostate.   Fractions: 28 fractions at 1.8 Gy per fraction  5 fractions at 2 Gy per fraction       2/28/2020 -  Chemotherapy     * 4/15/2020 - 9/28/20 completed 6 cycles adjuvant Xeloda 1000 mg/m2 bid days 1-14 every 21 days  Treatment Summary   Plan Name: OP CAPECITABINE 1250MG/M2 BID Q3W  Treatment Goal: Curative  Status: Active  Start Date: 3/20/2020  End Date: 3/20/2020  Provider: Richa Bahena MD  Chemotherapy: [No matching medication found in this treatment plan]            Hydrocele repaired.        - 3/29/2023 MRI sacrum/coccyx:  FINDINGS:  There is abnormal T1 hypointense signal throughout the sacrum as well  as the visualized left hemipelvis.  Multiple discrete foci also noted within the right partially visualized iliac bone.  These are accompanied by heterogeneous T2 signal as well as contrast enhancement and in keeping with osseous metastatic disease.  No evidence for epidural extension into the sacral canal or involvement of the sacral foramina.  No visualized soft tissue lesions.  No fracture.  Nonspecific mild soft tissue edema noted within the bilateral gluteal minimus, piriformis and left iliacus muscles.  Mild degenerative changes are noted in the sacroiliac joints.  No pelvic ascites or lymphadenopathy seen.  Visualized sciatic nerves are unremarkable.  Impression:  Extensive osseous metastatic disease of the pelvis involving much of the sacrum.  No evidence for invasion of the sacral canal or neural foramina.     - MRI L-spine:  FINDINGS:  Alignment: Normal.  Vertebrae:  Numerous T1 hypointense, STIR hyperintense, enhancing lesions are seen throughout the lumbar spine..  No fracture.  No epidural extension.  Discs: Disc desiccation and mild height loss of L4-5.  Posterior annular fissure of L4-5.  Remaining discs appear normal in height and signal.  No evidence for discitis.  Cord: Normal.  Conus terminates at L2  Degenerative findings:  T12-L1: No spinal canal stenosis or neural foraminal narrowing.  L1-L2: No spinal canal stenosis or neural foraminal narrowing.  L2-L3: Mild facet arthropathy.  No spinal canal stenosis or neural foraminal narrowing.  L3-L4: Mild facet arthropathy.  No spinal canal stenosis or neural foraminal narrowing.  L4-L5: Circumferential disc bulge and moderate facet arthropathy.  No spinal canal stenosis or neural foraminal narrowing.  L5-S1: Mild facet arthropathy.  No spinal canal stenosis or neural foraminal narrowing.  Paraspinal muscles & soft tissues: Mild paraspinal muscle atrophy.  Impression:  Redemonstration of known diffuse osseous metastatic disease throughout the lumbar  spine.  When compared to recent PET-CT, findings appear stable but progressed from prior MRI on 06/03/2021.  No fracture. No epidural extension.     - 3/20/2023 PET scan:  FINDINGS:  Quality of the study: Adequate.  In the head and neck, there are no hypermetabolic lesions worrisome for malignancy. There are no hypermetabolic mucosal lesions, and there are no pathologically enlarged or hypermetabolic lymph nodes.  In the chest, there is postoperative change of right mastectomy and right axillary lymph node dissection.  There is a new 1.1 cm right hilar lymph node (3-101) with max SUV 6.3 (603-99).  There is a right lower lobe pulmonary nodule measuring 0.9 cm, which does not show increased tracer uptake, though has been gradually enlarging (measured 0.3 cm on 06/09/2021) (3-106).  In the abdomen and pelvis, there is physiologic tracer distribution within the abdominal organs and excretion into the genitourinary system.  In the bones, there are numerous sclerotic lesions, with few index lesions detailed below:  Sclerotic lesion in T7 with tracer uptake mildly increased prior exam with SUV max 5.8 (previously 5.2) (3-107) (603-109).  T12 sclerotic focus demonstrates an SUV max of 5.9 (previously 4.8) (3-153, 603-153).  L5 vertebral body sclerotic lesion with an SUV max of 6.8 (previously 8.7) (3-213).  Newly hypermetabolic focus at the inferior aspect of the left scapula with SUV max of 7.4 (603-103).  Increased size and uptake of a left ischial tuberosity sclerotic lesion with SUV max of 7.3 (3-266).  Increased size of a L4 vertebral body sclerotic lesion (3-200).  Extensive sclerotic change through the pelvic bones grossly stable compared to the prior exam.  CT: Right maxillary mucosal retention cysts.  Left chest wall Port-A-Cath.  Postoperative change of right mastectomy with right axillary lymph node dissection.  Small bilateral fat containing inguinal hernias.  Impression:  In this patient with breast cancer  status post right mastectomy and right axillary lymph node dissection there has been progression of disease.  Newly tracer avid right hilar lymph node consistent with derick metastasis.  In the bones there are numerous sclerotic lesions, some of which demonstrate new abnormal uptake on FDG PET.  Suspicious right lower lobe pulmonary nodule does not show increase tracer uptake, though it is been gradually enlarging, and attention on follow-up is advised.  This report was flagged in Epic as abnormal.     Started Trodelvy 5/19/2023.   Note- did not receive day 8 with cycle 1.     Prior Oncology History:          Oncology History   Malignant neoplasm involving both nipple and areola of right breast in male, estrogen receptor negative   5/1/2019 Imaging Significant Findings     Mammogram and US  Impression:  Right  Mass: Right breast 14 mm mass at the retroareolar anterior position. Assessment: 4 - Suspicious finding. Biopsy is recommended.   Left  There is no mammographic or sonographic evidence of malignancy.  Recommendation:  Biopsy is recommended.      5/2/2019 Biopsy     BREAST, RIGHT, RETROAREOLAR MASS, ULTRASOUND-GUIDED BIOPSY:  Invasive ductal carcinoma with lobular features.  Size of invasive carcinoma: 5 MM in greatest linear dimension within a single      5/2/2019 Breast Tumor Markers     Estrogen: Negative  Progesterone: Negative  HER2: Negative  Ki67: 80      5/17/2019 Cancer Staged     Staging form: Breast, AJCC 8th Edition  - Clinical stage from 5/17/2019: Stage IB (cT1c, cN0, cM0, G2, ER-, NC-, HER2-) - Signed by Richa Bahena MD on 5/17/2019 5/27/2019 - 7/21/2019 Chemotherapy     Treatment Summary   Plan Name: OP BREAST DOSE-DENSE AC - DOXORUBICIN CYCLOPHOSPHAMIDE Q2W  Treatment Goal: Curative  Status: Inactive  Start Date: 5/27/2019  End Date: 7/9/2019  Provider: Richa Bahena MD  Chemotherapy: DOXOrubicin chemo injection 186 mg, 60 mg/m2 = 186 mg, Intravenous, Clinic/HOD 1 time, 4 of 4  cycles  Administration: 186 mg (5/27/2019), 186 mg (6/10/2019), 186 mg (6/24/2019), 186 mg (7/8/2019)  cyclophosphamide (CYTOXAN) 600 mg/m2 = 1,865 mg in sodium chloride 0.9% 250 mL chemo infusion, 600 mg/m2 = 1,865 mg, Intravenous, Clinic/HOD 1 time, 4 of 4 cycles  Administration: 1,865 mg (5/27/2019), 1,865 mg (6/10/2019), 1,865 mg (6/24/2019), 1,865 mg (7/8/2019)      7/22/2019 - 10/15/2019 Chemotherapy     Treatment Summary   Plan Name: OP PACLITAXEL QW  Treatment Goal: Curative  Status: Inactive  Start Date: 7/24/2019  End Date: 10/8/2019  Provider: Richa Bahena MD  Chemotherapy: PACLitaxel (TAXOL) 80 mg/m2 = 246 mg in sodium chloride 0.9% 250 mL chemo infusion, 80 mg/m2 = 246 mg, Intravenous, Clinic/HOD 1 time, 12 of 12 cycles  Dose modification: 60 mg/m2 (original dose 80 mg/m2, Cycle 3), 55 mg/m2 (original dose 80 mg/m2, Cycle 7), 60 mg/m2 (original dose 80 mg/m2, Cycle 7), 50 mg/m2 (original dose 80 mg/m2, Cycle 9), 50 mg/m2 (original dose 80 mg/m2, Cycle 10)  Administration: 246 mg (7/24/2019), 246 mg (7/31/2019), 186 mg (8/7/2019), 186 mg (8/14/2019), 186 mg (8/21/2019), 186 mg (8/28/2019), 186 mg (9/4/2019), 174 mg (9/11/2019), 156 mg (9/18/2019), 156 mg (9/25/2019), 156 mg (10/1/2019), 156 mg (10/8/2019)      11/22/2019 Breast Surgery     On 11/22/19 Dr whyte performed a right breast mastectomy with SLN biopsy with pathology showing grade 2, 6 mm IDC with extensive treatment effect, no LVI with 1 LN positive 4 mm, negative margins. The on 12/17/19, Dr Whyte performed right axillary dissection with pathology still pending.      11/22/2019 Cancer Staged     ypT1b N1      12/17/2019 Breast Surgery     Surgery: Dr Shima Whyte, [phone] performed right ALND with pathology showing 14 negative lymph nodes.             1/14/2020 Tumor Conference      Post mastectomy radiation therapy: Xeloda, then possible Keytruda trial .      2/3/2020 - 3/23/2020 Radiation Therapy     Treating physician: Speedy  Joanie ONEILL   Total Dose: 50.4 Gy to the chest wall and regional lymphatics  10 Gy boost to the prostate.   Fractions: 28 fractions at 1.8 Gy per fraction  5 fractions at 2 Gy per fraction       2/28/2020 -  Chemotherapy     * 4/15/2020 - 9/28/20 completed 6 cycles adjuvant Xeloda 1000 mg/m2 bid days 1-14 every 21 days  Treatment Summary   Plan Name: OP CAPECITABINE 1250MG/M2 BID Q3W  Treatment Goal: Curative  Status: Active  Start Date: 3/20/2020  End Date: 3/20/2020  Provider: Richa Bahena MD  Chemotherapy: [No matching medication found in this treatment plan]       Review of Systems   Constitutional:  Negative for activity change, appetite change, chills, fatigue, fever and unexpected weight change.        See Above   HENT:  Negative for dental problem, postnasal drip, rhinorrhea, sinus pressure/congestion, sore throat, trouble swallowing and voice change.    Eyes:  Negative for visual disturbance.   Respiratory:  Negative for cough, shortness of breath and wheezing.    Cardiovascular:  Negative for chest pain, palpitations and leg swelling.   Gastrointestinal:  Negative for abdominal distention, abdominal pain, blood in stool, change in bowel habit, constipation, diarrhea, nausea, vomiting and change in bowel habit.   Endocrine: Negative for cold intolerance and heat intolerance.   Genitourinary:  Negative for decreased urine volume, difficulty urinating, dysuria, frequency, hematuria and urgency.   Musculoskeletal:  Negative for arthralgias, back pain, gait problem, joint swelling, myalgias, neck pain and neck stiffness.        Lymphedema right arm   Integumentary:  Negative for color change, pallor, rash and wound.        Right arm lymphedema    Neurological:  Positive for numbness (improved neuropathy; right hand fingers). Negative for dizziness, weakness, light-headedness and headaches.   Hematological:  Negative for adenopathy. Does not bruise/bleed easily.   Psychiatric/Behavioral:  Negative for  dysphoric mood and sleep disturbance. The patient is not nervous/anxious.         Objective     Physical Exam  Vitals and nursing note reviewed.   Constitutional:       General: He is not in acute distress.     Appearance: Normal appearance. He is well-developed. He is obese. He is not diaphoretic.      Comments: Presents alone  Very pleasant  ECOG= 0   HENT:      Head: Normocephalic and atraumatic.   Eyes:      General: No scleral icterus.     Extraocular Movements: Extraocular movements intact.      Conjunctiva/sclera: Conjunctivae normal.      Pupils: Pupils are equal, round, and reactive to light.   Neck:      Thyroid: No thyromegaly.      Trachea: No tracheal deviation.   Cardiovascular:      Rate and Rhythm: Normal rate and regular rhythm.      Heart sounds: Normal heart sounds. No murmur heard.    No friction rub. No gallop.   Pulmonary:      Effort: Pulmonary effort is normal. No respiratory distress.      Breath sounds: Normal breath sounds. No wheezing, rhonchi or rales.   Chest:      Chest wall: No tenderness.   Abdominal:      General: Bowel sounds are normal. There is no distension.      Palpations: Abdomen is soft. There is no mass.      Tenderness: There is no abdominal tenderness. There is no guarding or rebound.      Comments: No organomegaly   Musculoskeletal:         General: Swelling (chronic RUE lymphedema) present. No tenderness or deformity. Normal range of motion.      Cervical back: Normal range of motion and neck supple. No rigidity or tenderness.      Right lower leg: No edema.      Left lower leg: No edema.   Lymphadenopathy:      Cervical: No cervical adenopathy.   Skin:     General: Skin is warm and dry.      Coloration: Skin is not jaundiced or pale.      Findings: No erythema or rash.   Neurological:      General: No focal deficit present.      Mental Status: He is alert and oriented to person, place, and time. Mental status is at baseline.      Cranial Nerves: No cranial nerve  deficit.      Sensory: No sensory deficit.      Motor: No weakness or abnormal muscle tone.      Coordination: Coordination normal.      Gait: Gait normal.      Deep Tendon Reflexes: Reflexes are normal and symmetric. Reflexes normal.   Psychiatric:         Mood and Affect: Mood normal.         Behavior: Behavior normal.         Thought Content: Thought content normal.         Judgment: Judgment normal.          Assessment and Plan     1. Neuropathy    2. 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 Malignant neoplasm involving both nipple and areola of right breast in male, estrogen receptor negative  Overview:  1. Stage IB ([id number]) invasive ductal carcinoma with lobular features of right breast, retroareolar, ER neg, [place] neg, Her2 neg, Grade 2, Ki67 80%, diagnosed 5/2019   * Genetics negative   * 5/27/19 initiated neoadjuvant ddAC followed by weekly Taxol.       5. Bone metastases  Overview:  O Regualtory Update 4/1/23      6. Anemia associated with chemotherapy    7. Lymphedema of right upper extremity    Other orders  -     acetaminophen tablet 650 mg  -     diphenhydrAMINE (BENADRYL) 25 mg in sodium chloride 0.9% 50 mL IVPB  -     famotidine (PF) injection 20 mg  -     aprepitant (CINVANTI) injection 130 mg  -     palonosetron (ALOXI) 0.25 mg with Dexamethasone (DECADRON) 12 mg in NS 50 mL IVPB  -     sacituzumab govitecan-hziy (TRODELVY) 1,480 mg in sodium chloride 0.9% 500 mL chemo infusion  -     atropine injection 0.4 mg  -     EPINEPHrine (EPIPEN) 0.3 mg/0.3 mL pen injection 0.3 mg  -     diphenhydrAMINE injection 50 mg  -     hydrocortisone sodium succinate injection 100 mg  -     sodium chloride 0.9% 250 mL flush bag  -     sodium chloride 0.9% flush 10 mL  -     heparin, porcine (PF) 100 unit/mL injection flush 500 Units  -     alteplase injection 2 mg        PDL1 sent   We discussed he only received 2 treatments  C1 D1 and C2D1  and missed day 8  Last PET 3/51247  Unclear if enough chemo to fairly evaluate for progression  Recommend growth factor and change to day 1 and day 15 cycles  Zometa    Scan post C3 completion  Await PDL1 as never sent prior as requested    40 minutes total    Route Chart for Scheduling    Med Onc Chart Routing      Follow up with physician . Change 7/7 chemo to 7/14 and needs zometa same day   Follow up with JAC . 7/13 cbc cmp and EP with PJ   Infusion scheduling note    Injection scheduling note    Labs    Imaging    Pharmacy appointment    Other referrals            Treatment Plan Information   OP SACITUZUMAB GOVITECAN-HZIY Q3W   Rosa Linn MD   Upcoming Treatment Dates - OP SACITUZUMAB GOVITECAN-HZIY Q3W    6/16/2023       Pre-Medications       acetaminophen tablet 650 mg       diphenhydrAMINE (BENADRYL) 25 mg in sodium chloride 0.9% 50 mL IVPB       famotidine (PF) injection 20 mg       Chemotherapy       sacituzumab govitecan-hziy (TRODELVY) 1,480 mg in sodium chloride 0.9% 500 mL chemo infusion       Supportive Care       atropine injection 0.4 mg       Antiemetics       aprepitant (CINVANTI) injection 130 mg       palonosetron (ALOXI) 0.25 mg with Dexamethasone (DECADRON) 12 mg in NS 50 mL IVPB  6/30/2023       Pre-Medications       acetaminophen tablet 650 mg       diphenhydrAMINE (BENADRYL) 25 mg in sodium chloride 0.9% 50 mL IVPB       famotidine (PF) injection 20 mg       Chemotherapy       sacituzumab govitecan-hziy (TRODELVY) 1,480 mg in sodium chloride 0.9% 500 mL chemo infusion       Supportive Care       atropine injection 0.4 mg       Antiemetics       aprepitant (CINVANTI) injection 130 mg       palonosetron (ALOXI) 0.25 mg with Dexamethasone (DECADRON) 12 mg in NS 50 mL IVPB  7/7/2023       Pre-Medications       acetaminophen tablet 650 mg       diphenhydrAMINE (BENADRYL) 25 mg in sodium chloride 0.9% 50 mL IVPB       famotidine (PF) injection 20 mg       Chemotherapy       sacituzumab  govitecan-hziy (TRODELVY) 1,480 mg in sodium chloride 0.9% 500 mL chemo infusion       Supportive Care       atropine injection 0.4 mg       Antiemetics       aprepitant (CINVANTI) injection 130 mg       palonosetron (ALOXI) 0.25 mg with Dexamethasone (DECADRON) 12 mg in NS 50 mL IVPB  7/21/2023       Pre-Medications       acetaminophen tablet 650 mg       diphenhydrAMINE (BENADRYL) 25 mg in sodium chloride 0.9% 50 mL IVPB       famotidine (PF) injection 20 mg       Chemotherapy       sacituzumab govitecan-hziy (TRODELVY) 1,480 mg in sodium chloride 0.9% 500 mL chemo infusion       Supportive Care       atropine injection 0.4 mg       Antiemetics       aprepitant (CINVANTI) injection 130 mg       palonosetron (ALOXI) 0.25 mg with Dexamethasone (DECADRON) 12 mg in NS 50 mL IVPB    Supportive Plan Information  OP FILGRASTIM 480 MCG   Michelle Grayson NP   Upcoming Treatment Dates - OP FILGRASTIM 480 MCG    No upcoming days in selected categories.    Therapy Plan Information  PORT FLUSH  Flushes  heparin, porcine (PF) 100 unit/mL injection flush 500 Units  500 Units, Intravenous, Every visit  sodium chloride 0.9% flush 10 mL  10 mL, Intravenous, Every visit    ZOLEDRONIC ACID (ZOMETA) IV  Medications  zoledronic 4 mg/100 mL infusion 4 mg  4 mg, Intravenous, Every 4 weeks  Flushes  sodium chloride 0.9% 250 mL flush bag  Intravenous, Every 4 weeks  sodium chloride 0.9% flush 10 mL  10 mL, Intravenous, Every 4 weeks  heparin, porcine (PF) 100 unit/mL injection flush 500 Units  500 Units, Intravenous, Every 4 weeks  alteplase injection 2 mg  2 mg, Intra-Catheter, Every 4 weeks           No follow-ups on file.

## 2023-06-29 ENCOUNTER — LAB VISIT (OUTPATIENT)
Dept: LAB | Facility: HOSPITAL | Age: 48
End: 2023-06-29
Attending: NURSE PRACTITIONER
Payer: MEDICARE

## 2023-06-29 ENCOUNTER — OFFICE VISIT (OUTPATIENT)
Dept: HEMATOLOGY/ONCOLOGY | Facility: CLINIC | Age: 48
End: 2023-06-29
Payer: MEDICARE

## 2023-06-29 VITALS
DIASTOLIC BLOOD PRESSURE: 55 MMHG | HEART RATE: 55 BPM | HEIGHT: 75 IN | WEIGHT: 315 LBS | SYSTOLIC BLOOD PRESSURE: 112 MMHG | BODY MASS INDEX: 39.17 KG/M2 | TEMPERATURE: 98 F | RESPIRATION RATE: 18 BRPM | OXYGEN SATURATION: 98 %

## 2023-06-29 DIAGNOSIS — I15.2 HYPERTENSION ASSOCIATED WITH DIABETES: ICD-10-CM

## 2023-06-29 DIAGNOSIS — E11.59 HYPERTENSION ASSOCIATED WITH DIABETES: ICD-10-CM

## 2023-06-29 DIAGNOSIS — Z79.899 IMMUNOSUPPRESSED DUE TO CHEMOTHERAPY: ICD-10-CM

## 2023-06-29 DIAGNOSIS — C79.51 MALIGNANT NEOPLASM METASTATIC TO BONE: ICD-10-CM

## 2023-06-29 DIAGNOSIS — T45.1X5A ANEMIA ASSOCIATED WITH CHEMOTHERAPY: ICD-10-CM

## 2023-06-29 DIAGNOSIS — C50.021 MALIGNANT NEOPLASM INVOLVING BOTH NIPPLE AND AREOLA OF RIGHT BREAST IN MALE, ESTROGEN RECEPTOR NEGATIVE: ICD-10-CM

## 2023-06-29 DIAGNOSIS — D64.81 ANEMIA ASSOCIATED WITH CHEMOTHERAPY: ICD-10-CM

## 2023-06-29 DIAGNOSIS — T45.1X5A IMMUNOSUPPRESSED DUE TO CHEMOTHERAPY: ICD-10-CM

## 2023-06-29 DIAGNOSIS — I89.0 LYMPHEDEMA OF RIGHT UPPER EXTREMITY: ICD-10-CM

## 2023-06-29 DIAGNOSIS — Z17.1 MALIGNANT NEOPLASM INVOLVING BOTH NIPPLE AND AREOLA OF RIGHT BREAST IN MALE, ESTROGEN RECEPTOR NEGATIVE: ICD-10-CM

## 2023-06-29 DIAGNOSIS — G62.9 NEUROPATHY: Primary | ICD-10-CM

## 2023-06-29 DIAGNOSIS — D84.821 IMMUNOSUPPRESSED DUE TO CHEMOTHERAPY: ICD-10-CM

## 2023-06-29 LAB
ALBUMIN SERPL BCP-MCNC: 3.1 G/DL (ref 3.5–5.2)
ALP SERPL-CCNC: 230 U/L (ref 55–135)
ALT SERPL W/O P-5'-P-CCNC: 22 U/L (ref 10–44)
ANION GAP SERPL CALC-SCNC: 12 MMOL/L (ref 8–16)
AST SERPL-CCNC: 34 U/L (ref 10–40)
BILIRUB SERPL-MCNC: 0.3 MG/DL (ref 0.1–1)
BUN SERPL-MCNC: 15 MG/DL (ref 6–20)
CALCIUM SERPL-MCNC: 9.4 MG/DL (ref 8.7–10.5)
CHLORIDE SERPL-SCNC: 104 MMOL/L (ref 95–110)
CO2 SERPL-SCNC: 23 MMOL/L (ref 23–29)
CREAT SERPL-MCNC: 1 MG/DL (ref 0.5–1.4)
ERYTHROCYTE [DISTWIDTH] IN BLOOD BY AUTOMATED COUNT: 18.5 % (ref 11.5–14.5)
EST. GFR  (NO RACE VARIABLE): >60 ML/MIN/1.73 M^2
GLUCOSE SERPL-MCNC: 103 MG/DL (ref 70–110)
HCT VFR BLD AUTO: 31.5 % (ref 40–54)
HGB BLD-MCNC: 9.5 G/DL (ref 14–18)
IMM GRANULOCYTES # BLD AUTO: 0.03 K/UL (ref 0–0.04)
MCH RBC QN AUTO: 23.3 PG (ref 27–31)
MCHC RBC AUTO-ENTMCNC: 30.2 G/DL (ref 32–36)
MCV RBC AUTO: 77 FL (ref 82–98)
NEUTROPHILS # BLD AUTO: 2.4 K/UL (ref 1.8–7.7)
PLATELET # BLD AUTO: 252 K/UL (ref 150–450)
PMV BLD AUTO: 9.5 FL (ref 9.2–12.9)
POTASSIUM SERPL-SCNC: 4.4 MMOL/L (ref 3.5–5.1)
PROT SERPL-MCNC: 7.9 G/DL (ref 6–8.4)
RBC # BLD AUTO: 4.07 M/UL (ref 4.6–6.2)
SODIUM SERPL-SCNC: 139 MMOL/L (ref 136–145)
WBC # BLD AUTO: 4.06 K/UL (ref 3.9–12.7)

## 2023-06-29 PROCEDURE — 3008F BODY MASS INDEX DOCD: CPT | Mod: CPTII,S$GLB,, | Performed by: INTERNAL MEDICINE

## 2023-06-29 PROCEDURE — 85027 COMPLETE CBC AUTOMATED: CPT | Performed by: NURSE PRACTITIONER

## 2023-06-29 PROCEDURE — 99999 PR PBB SHADOW E&M-EST. PATIENT-LVL V: ICD-10-PCS | Mod: PBBFAC,,, | Performed by: INTERNAL MEDICINE

## 2023-06-29 PROCEDURE — 1160F RVW MEDS BY RX/DR IN RCRD: CPT | Mod: CPTII,S$GLB,, | Performed by: INTERNAL MEDICINE

## 2023-06-29 PROCEDURE — 4010F ACE/ARB THERAPY RXD/TAKEN: CPT | Mod: CPTII,S$GLB,, | Performed by: INTERNAL MEDICINE

## 2023-06-29 PROCEDURE — 1159F PR MEDICATION LIST DOCUMENTED IN MEDICAL RECORD: ICD-10-PCS | Mod: CPTII,S$GLB,, | Performed by: INTERNAL MEDICINE

## 2023-06-29 PROCEDURE — 1160F PR REVIEW ALL MEDS BY PRESCRIBER/CLIN PHARMACIST DOCUMENTED: ICD-10-PCS | Mod: CPTII,S$GLB,, | Performed by: INTERNAL MEDICINE

## 2023-06-29 PROCEDURE — 99215 OFFICE O/P EST HI 40 MIN: CPT | Mod: S$GLB,,, | Performed by: INTERNAL MEDICINE

## 2023-06-29 PROCEDURE — 3074F SYST BP LT 130 MM HG: CPT | Mod: CPTII,S$GLB,, | Performed by: INTERNAL MEDICINE

## 2023-06-29 PROCEDURE — 3074F PR MOST RECENT SYSTOLIC BLOOD PRESSURE < 130 MM HG: ICD-10-PCS | Mod: CPTII,S$GLB,, | Performed by: INTERNAL MEDICINE

## 2023-06-29 PROCEDURE — 3044F PR MOST RECENT HEMOGLOBIN A1C LEVEL <7.0%: ICD-10-PCS | Mod: CPTII,S$GLB,, | Performed by: INTERNAL MEDICINE

## 2023-06-29 PROCEDURE — 4010F PR ACE/ARB THEARPY RXD/TAKEN: ICD-10-PCS | Mod: CPTII,S$GLB,, | Performed by: INTERNAL MEDICINE

## 2023-06-29 PROCEDURE — 3078F PR MOST RECENT DIASTOLIC BLOOD PRESSURE < 80 MM HG: ICD-10-PCS | Mod: CPTII,S$GLB,, | Performed by: INTERNAL MEDICINE

## 2023-06-29 PROCEDURE — 99215 PR OFFICE/OUTPT VISIT, EST, LEVL V, 40-54 MIN: ICD-10-PCS | Mod: S$GLB,,, | Performed by: INTERNAL MEDICINE

## 2023-06-29 PROCEDURE — 80053 COMPREHEN METABOLIC PANEL: CPT | Performed by: NURSE PRACTITIONER

## 2023-06-29 PROCEDURE — 99999 PR PBB SHADOW E&M-EST. PATIENT-LVL V: CPT | Mod: PBBFAC,,, | Performed by: INTERNAL MEDICINE

## 2023-06-29 PROCEDURE — 3008F PR BODY MASS INDEX (BMI) DOCUMENTED: ICD-10-PCS | Mod: CPTII,S$GLB,, | Performed by: INTERNAL MEDICINE

## 2023-06-29 PROCEDURE — 36415 COLL VENOUS BLD VENIPUNCTURE: CPT | Performed by: NURSE PRACTITIONER

## 2023-06-29 PROCEDURE — 1159F MED LIST DOCD IN RCRD: CPT | Mod: CPTII,S$GLB,, | Performed by: INTERNAL MEDICINE

## 2023-06-29 PROCEDURE — 3044F HG A1C LEVEL LT 7.0%: CPT | Mod: CPTII,S$GLB,, | Performed by: INTERNAL MEDICINE

## 2023-06-29 PROCEDURE — 3078F DIAST BP <80 MM HG: CPT | Mod: CPTII,S$GLB,, | Performed by: INTERNAL MEDICINE

## 2023-06-29 RX ORDER — ACETAMINOPHEN 325 MG/1
650 TABLET ORAL
Status: CANCELLED | OUTPATIENT
Start: 2023-06-29

## 2023-06-29 RX ORDER — EPINEPHRINE 0.3 MG/.3ML
0.3 INJECTION SUBCUTANEOUS ONCE AS NEEDED
Status: CANCELLED | OUTPATIENT
Start: 2023-06-29

## 2023-06-29 RX ORDER — SODIUM CHLORIDE 0.9 % (FLUSH) 0.9 %
10 SYRINGE (ML) INJECTION
Status: CANCELLED | OUTPATIENT
Start: 2023-06-29

## 2023-06-29 RX ORDER — FAMOTIDINE 10 MG/ML
20 INJECTION INTRAVENOUS
Status: CANCELLED | OUTPATIENT
Start: 2023-06-29

## 2023-06-29 RX ORDER — IBUPROFEN 600 MG/1
600 TABLET ORAL EVERY 8 HOURS PRN
Qty: 20 TABLET | Refills: 0 | Status: SHIPPED | OUTPATIENT
Start: 2023-06-29 | End: 2023-07-15 | Stop reason: SDUPTHER

## 2023-06-29 RX ORDER — HEPARIN 100 UNIT/ML
500 SYRINGE INTRAVENOUS
Status: CANCELLED | OUTPATIENT
Start: 2023-06-29

## 2023-06-29 RX ORDER — ATROPINE SULFATE 0.4 MG/ML
0.4 INJECTION, SOLUTION ENDOTRACHEAL; INTRAMEDULLARY; INTRAMUSCULAR; INTRAVENOUS; SUBCUTANEOUS ONCE AS NEEDED
Status: CANCELLED | OUTPATIENT
Start: 2023-06-29

## 2023-06-29 RX ORDER — DIPHENHYDRAMINE HYDROCHLORIDE 50 MG/ML
50 INJECTION INTRAMUSCULAR; INTRAVENOUS ONCE AS NEEDED
Status: CANCELLED | OUTPATIENT
Start: 2023-06-29

## 2023-06-30 ENCOUNTER — INFUSION (OUTPATIENT)
Dept: INFUSION THERAPY | Facility: HOSPITAL | Age: 48
End: 2023-06-30
Attending: INTERNAL MEDICINE
Payer: MEDICARE

## 2023-06-30 VITALS
SYSTOLIC BLOOD PRESSURE: 108 MMHG | DIASTOLIC BLOOD PRESSURE: 58 MMHG | RESPIRATION RATE: 18 BRPM | HEART RATE: 62 BPM | OXYGEN SATURATION: 98 % | TEMPERATURE: 98 F

## 2023-06-30 DIAGNOSIS — C50.021 MALIGNANT NEOPLASM INVOLVING BOTH NIPPLE AND AREOLA OF RIGHT BREAST IN MALE, ESTROGEN RECEPTOR NEGATIVE: Primary | ICD-10-CM

## 2023-06-30 DIAGNOSIS — Z17.1 MALIGNANT NEOPLASM INVOLVING BOTH NIPPLE AND AREOLA OF RIGHT BREAST IN MALE, ESTROGEN RECEPTOR NEGATIVE: Primary | ICD-10-CM

## 2023-06-30 DIAGNOSIS — G89.3 NEOPLASM RELATED PAIN: ICD-10-CM

## 2023-06-30 PROCEDURE — A4216 STERILE WATER/SALINE, 10 ML: HCPCS | Performed by: INTERNAL MEDICINE

## 2023-06-30 PROCEDURE — 96367 TX/PROPH/DG ADDL SEQ IV INF: CPT

## 2023-06-30 PROCEDURE — 96413 CHEMO IV INFUSION 1 HR: CPT

## 2023-06-30 PROCEDURE — 25000003 PHARM REV CODE 250: Performed by: INTERNAL MEDICINE

## 2023-06-30 PROCEDURE — 63600175 PHARM REV CODE 636 W HCPCS: Performed by: INTERNAL MEDICINE

## 2023-06-30 PROCEDURE — 96375 TX/PRO/DX INJ NEW DRUG ADDON: CPT

## 2023-06-30 RX ORDER — DIPHENHYDRAMINE HYDROCHLORIDE 50 MG/ML
50 INJECTION INTRAMUSCULAR; INTRAVENOUS ONCE AS NEEDED
Status: DISCONTINUED | OUTPATIENT
Start: 2023-06-30 | End: 2023-06-30 | Stop reason: HOSPADM

## 2023-06-30 RX ORDER — HEPARIN 100 UNIT/ML
500 SYRINGE INTRAVENOUS
Status: CANCELLED | OUTPATIENT
Start: 2023-06-30

## 2023-06-30 RX ORDER — HEPARIN 100 UNIT/ML
500 SYRINGE INTRAVENOUS
Status: DISCONTINUED | OUTPATIENT
Start: 2023-06-30 | End: 2023-06-30 | Stop reason: HOSPADM

## 2023-06-30 RX ORDER — FAMOTIDINE 10 MG/ML
20 INJECTION INTRAVENOUS
Status: CANCELLED | OUTPATIENT
Start: 2023-06-30

## 2023-06-30 RX ORDER — EPINEPHRINE 0.3 MG/.3ML
0.3 INJECTION SUBCUTANEOUS ONCE AS NEEDED
Status: CANCELLED | OUTPATIENT
Start: 2023-06-30

## 2023-06-30 RX ORDER — SODIUM CHLORIDE 0.9 % (FLUSH) 0.9 %
10 SYRINGE (ML) INJECTION
Status: CANCELLED | OUTPATIENT
Start: 2023-06-30

## 2023-06-30 RX ORDER — DIPHENHYDRAMINE HYDROCHLORIDE 50 MG/ML
50 INJECTION INTRAMUSCULAR; INTRAVENOUS ONCE AS NEEDED
Status: CANCELLED | OUTPATIENT
Start: 2023-06-30

## 2023-06-30 RX ORDER — ACETAMINOPHEN 325 MG/1
650 TABLET ORAL
Status: COMPLETED | OUTPATIENT
Start: 2023-06-30 | End: 2023-06-30

## 2023-06-30 RX ORDER — HYDROCODONE BITARTRATE AND ACETAMINOPHEN 10; 325 MG/1; MG/1
1 TABLET ORAL EVERY 6 HOURS PRN
Qty: 90 TABLET | Refills: 0 | Status: SHIPPED | OUTPATIENT
Start: 2023-06-30 | End: 2023-08-01 | Stop reason: SDUPTHER

## 2023-06-30 RX ORDER — ACETAMINOPHEN 325 MG/1
650 TABLET ORAL
Status: CANCELLED | OUTPATIENT
Start: 2023-06-30

## 2023-06-30 RX ORDER — ATROPINE SULFATE 0.4 MG/ML
0.4 INJECTION, SOLUTION ENDOTRACHEAL; INTRAMEDULLARY; INTRAMUSCULAR; INTRAVENOUS; SUBCUTANEOUS ONCE AS NEEDED
Status: DISCONTINUED | OUTPATIENT
Start: 2023-06-30 | End: 2023-06-30 | Stop reason: HOSPADM

## 2023-06-30 RX ORDER — ATROPINE SULFATE 0.4 MG/ML
0.4 INJECTION, SOLUTION ENDOTRACHEAL; INTRAMEDULLARY; INTRAMUSCULAR; INTRAVENOUS; SUBCUTANEOUS ONCE AS NEEDED
Status: CANCELLED | OUTPATIENT
Start: 2023-06-30

## 2023-06-30 RX ORDER — SODIUM CHLORIDE 0.9 % (FLUSH) 0.9 %
10 SYRINGE (ML) INJECTION
Status: DISCONTINUED | OUTPATIENT
Start: 2023-06-30 | End: 2023-06-30 | Stop reason: HOSPADM

## 2023-06-30 RX ORDER — FAMOTIDINE 10 MG/ML
20 INJECTION INTRAVENOUS
Status: COMPLETED | OUTPATIENT
Start: 2023-06-30 | End: 2023-06-30

## 2023-06-30 RX ORDER — EPINEPHRINE 0.3 MG/.3ML
0.3 INJECTION SUBCUTANEOUS ONCE AS NEEDED
Status: DISCONTINUED | OUTPATIENT
Start: 2023-06-30 | End: 2023-06-30 | Stop reason: HOSPADM

## 2023-06-30 RX ADMIN — DIPHENHYDRAMINE HYDROCHLORIDE 25 MG: 50 INJECTION, SOLUTION INTRAMUSCULAR; INTRAVENOUS at 11:06

## 2023-06-30 RX ADMIN — DEXAMETHASONE SODIUM PHOSPHATE 0.25 MG: 4 INJECTION, SOLUTION INTRA-ARTICULAR; INTRALESIONAL; INTRAMUSCULAR; INTRAVENOUS; SOFT TISSUE at 11:06

## 2023-06-30 RX ADMIN — APREPITANT 130 MG: 130 INJECTION, EMULSION INTRAVENOUS at 11:06

## 2023-06-30 RX ADMIN — HEPARIN 500 UNITS: 100 SYRINGE at 01:06

## 2023-06-30 RX ADMIN — SODIUM CHLORIDE: 9 INJECTION, SOLUTION INTRAVENOUS at 11:06

## 2023-06-30 RX ADMIN — Medication 10 ML: at 01:06

## 2023-06-30 RX ADMIN — FAMOTIDINE 20 MG: 10 INJECTION INTRAVENOUS at 11:06

## 2023-06-30 RX ADMIN — SACITUZUMAB GOVITECAN 1440 MG: 180 POWDER, FOR SOLUTION INTRAVENOUS at 12:06

## 2023-06-30 RX ADMIN — ACETAMINOPHEN 650 MG: 325 TABLET ORAL at 11:06

## 2023-06-30 NOTE — PLAN OF CARE
1330-Pt tolerated Trodelvy infusion well, no complications or side effects, POC and meds discussed with pt, pt aware of upcoming appts, pt knows to call MD with any questions or concerns. Pt ambulated off unit, no distress noted.

## 2023-07-01 ENCOUNTER — INFUSION (OUTPATIENT)
Dept: INFUSION THERAPY | Facility: HOSPITAL | Age: 48
End: 2023-07-01
Attending: INTERNAL MEDICINE
Payer: MEDICARE

## 2023-07-01 DIAGNOSIS — C50.021 MALIGNANT NEOPLASM INVOLVING BOTH NIPPLE AND AREOLA OF RIGHT BREAST IN MALE, ESTROGEN RECEPTOR NEGATIVE: Primary | ICD-10-CM

## 2023-07-01 DIAGNOSIS — Z17.1 MALIGNANT NEOPLASM INVOLVING BOTH NIPPLE AND AREOLA OF RIGHT BREAST IN MALE, ESTROGEN RECEPTOR NEGATIVE: Primary | ICD-10-CM

## 2023-07-01 PROCEDURE — 96372 THER/PROPH/DIAG INJ SC/IM: CPT

## 2023-07-01 PROCEDURE — 63600175 PHARM REV CODE 636 W HCPCS: Mod: JZ,JG | Performed by: INTERNAL MEDICINE

## 2023-07-01 RX ADMIN — PEGFILGRASTIM-BMEZ 6 MG: 6 INJECTION SUBCUTANEOUS at 11:07

## 2023-07-02 ENCOUNTER — PATIENT MESSAGE (OUTPATIENT)
Dept: HEMATOLOGY/ONCOLOGY | Facility: CLINIC | Age: 48
End: 2023-07-02
Payer: MEDICARE

## 2023-07-05 ENCOUNTER — PATIENT OUTREACH (OUTPATIENT)
Dept: ADMINISTRATIVE | Facility: HOSPITAL | Age: 48
End: 2023-07-05
Payer: MEDICARE

## 2023-07-07 LAB — FINAL PATHOLOGIC DIAGNOSIS: NORMAL

## 2023-07-13 ENCOUNTER — OFFICE VISIT (OUTPATIENT)
Dept: HEMATOLOGY/ONCOLOGY | Facility: CLINIC | Age: 48
End: 2023-07-13
Payer: MEDICARE

## 2023-07-13 ENCOUNTER — LAB VISIT (OUTPATIENT)
Dept: LAB | Facility: HOSPITAL | Age: 48
End: 2023-07-13
Attending: INTERNAL MEDICINE
Payer: MEDICARE

## 2023-07-13 VITALS
HEART RATE: 78 BPM | WEIGHT: 315 LBS | SYSTOLIC BLOOD PRESSURE: 131 MMHG | TEMPERATURE: 98 F | HEIGHT: 75 IN | DIASTOLIC BLOOD PRESSURE: 60 MMHG | OXYGEN SATURATION: 98 % | RESPIRATION RATE: 18 BRPM | BODY MASS INDEX: 39.17 KG/M2

## 2023-07-13 DIAGNOSIS — C79.51 MALIGNANT NEOPLASM METASTATIC TO BONE: ICD-10-CM

## 2023-07-13 DIAGNOSIS — I89.0 LYMPHEDEMA: ICD-10-CM

## 2023-07-13 DIAGNOSIS — C50.021 MALIGNANT NEOPLASM INVOLVING BOTH NIPPLE AND AREOLA OF RIGHT BREAST IN MALE, ESTROGEN RECEPTOR NEGATIVE: Primary | ICD-10-CM

## 2023-07-13 DIAGNOSIS — Z17.1 MALIGNANT NEOPLASM INVOLVING BOTH NIPPLE AND AREOLA OF RIGHT BREAST IN MALE, ESTROGEN RECEPTOR NEGATIVE: Primary | ICD-10-CM

## 2023-07-13 DIAGNOSIS — I15.2 HYPERTENSION ASSOCIATED WITH DIABETES: ICD-10-CM

## 2023-07-13 DIAGNOSIS — G62.9 NEUROPATHY: ICD-10-CM

## 2023-07-13 DIAGNOSIS — T45.1X5A CHEMOTHERAPY-INDUCED NEUTROPENIA: ICD-10-CM

## 2023-07-13 DIAGNOSIS — E11.59 HYPERTENSION ASSOCIATED WITH DIABETES: ICD-10-CM

## 2023-07-13 DIAGNOSIS — M89.8X9 BONE PAIN DUE TO G-CSF: ICD-10-CM

## 2023-07-13 DIAGNOSIS — D64.81 ANEMIA ASSOCIATED WITH CHEMOTHERAPY: ICD-10-CM

## 2023-07-13 DIAGNOSIS — C50.021 MALIGNANT NEOPLASM INVOLVING BOTH NIPPLE AND AREOLA OF RIGHT BREAST IN MALE, ESTROGEN RECEPTOR NEGATIVE: ICD-10-CM

## 2023-07-13 DIAGNOSIS — D70.1 CHEMOTHERAPY-INDUCED NEUTROPENIA: ICD-10-CM

## 2023-07-13 DIAGNOSIS — Z17.1 MALIGNANT NEOPLASM INVOLVING BOTH NIPPLE AND AREOLA OF RIGHT BREAST IN MALE, ESTROGEN RECEPTOR NEGATIVE: ICD-10-CM

## 2023-07-13 DIAGNOSIS — F43.23 ADJUSTMENT DISORDER WITH MIXED ANXIETY AND DEPRESSED MOOD: ICD-10-CM

## 2023-07-13 DIAGNOSIS — T45.1X5A ANEMIA ASSOCIATED WITH CHEMOTHERAPY: ICD-10-CM

## 2023-07-13 LAB
ALBUMIN SERPL BCP-MCNC: 3.2 G/DL (ref 3.5–5.2)
ALP SERPL-CCNC: 209 U/L (ref 55–135)
ALT SERPL W/O P-5'-P-CCNC: 30 U/L (ref 10–44)
ANION GAP SERPL CALC-SCNC: 8 MMOL/L (ref 8–16)
AST SERPL-CCNC: 18 U/L (ref 10–40)
BASOPHILS # BLD AUTO: 0.01 K/UL (ref 0–0.2)
BASOPHILS NFR BLD: 0.1 % (ref 0–1.9)
BILIRUB SERPL-MCNC: 0.4 MG/DL (ref 0.1–1)
BUN SERPL-MCNC: 10 MG/DL (ref 6–20)
CALCIUM SERPL-MCNC: 9.1 MG/DL (ref 8.7–10.5)
CHLORIDE SERPL-SCNC: 103 MMOL/L (ref 95–110)
CO2 SERPL-SCNC: 25 MMOL/L (ref 23–29)
CREAT SERPL-MCNC: 0.9 MG/DL (ref 0.5–1.4)
DIFFERENTIAL METHOD: ABNORMAL
EOSINOPHIL # BLD AUTO: 0 K/UL (ref 0–0.5)
EOSINOPHIL NFR BLD: 0.1 % (ref 0–8)
ERYTHROCYTE [DISTWIDTH] IN BLOOD BY AUTOMATED COUNT: 19.8 % (ref 11.5–14.5)
EST. GFR  (NO RACE VARIABLE): >60 ML/MIN/1.73 M^2
GLUCOSE SERPL-MCNC: 91 MG/DL (ref 70–110)
HCT VFR BLD AUTO: 30.2 % (ref 40–54)
HGB BLD-MCNC: 9.1 G/DL (ref 14–18)
IMM GRANULOCYTES # BLD AUTO: 0.05 K/UL (ref 0–0.04)
IMM GRANULOCYTES NFR BLD AUTO: 0.6 % (ref 0–0.5)
LYMPHOCYTES # BLD AUTO: 1.3 K/UL (ref 1–4.8)
LYMPHOCYTES NFR BLD: 14.8 % (ref 18–48)
MCH RBC QN AUTO: 23.5 PG (ref 27–31)
MCHC RBC AUTO-ENTMCNC: 30.1 G/DL (ref 32–36)
MCV RBC AUTO: 78 FL (ref 82–98)
MONOCYTES # BLD AUTO: 0.5 K/UL (ref 0.3–1)
MONOCYTES NFR BLD: 6.3 % (ref 4–15)
NEUTROPHILS # BLD AUTO: 6.7 K/UL (ref 1.8–7.7)
NEUTROPHILS NFR BLD: 78.1 % (ref 38–73)
NRBC BLD-RTO: 1 /100 WBC
PLATELET # BLD AUTO: 166 K/UL (ref 150–450)
PMV BLD AUTO: 10 FL (ref 9.2–12.9)
POTASSIUM SERPL-SCNC: 4.7 MMOL/L (ref 3.5–5.1)
PROT SERPL-MCNC: 7.4 G/DL (ref 6–8.4)
RBC # BLD AUTO: 3.88 M/UL (ref 4.6–6.2)
SODIUM SERPL-SCNC: 136 MMOL/L (ref 136–145)
WBC # BLD AUTO: 8.54 K/UL (ref 3.9–12.7)

## 2023-07-13 PROCEDURE — 3008F PR BODY MASS INDEX (BMI) DOCUMENTED: ICD-10-PCS | Mod: CPTII,S$GLB,, | Performed by: NURSE PRACTITIONER

## 2023-07-13 PROCEDURE — 85025 COMPLETE CBC W/AUTO DIFF WBC: CPT | Performed by: INTERNAL MEDICINE

## 2023-07-13 PROCEDURE — 3078F PR MOST RECENT DIASTOLIC BLOOD PRESSURE < 80 MM HG: ICD-10-PCS | Mod: CPTII,S$GLB,, | Performed by: NURSE PRACTITIONER

## 2023-07-13 PROCEDURE — 3075F SYST BP GE 130 - 139MM HG: CPT | Mod: CPTII,S$GLB,, | Performed by: NURSE PRACTITIONER

## 2023-07-13 PROCEDURE — 3044F PR MOST RECENT HEMOGLOBIN A1C LEVEL <7.0%: ICD-10-PCS | Mod: CPTII,S$GLB,, | Performed by: NURSE PRACTITIONER

## 2023-07-13 PROCEDURE — 3075F PR MOST RECENT SYSTOLIC BLOOD PRESS GE 130-139MM HG: ICD-10-PCS | Mod: CPTII,S$GLB,, | Performed by: NURSE PRACTITIONER

## 2023-07-13 PROCEDURE — 1159F MED LIST DOCD IN RCRD: CPT | Mod: CPTII,S$GLB,, | Performed by: NURSE PRACTITIONER

## 2023-07-13 PROCEDURE — 3008F BODY MASS INDEX DOCD: CPT | Mod: CPTII,S$GLB,, | Performed by: NURSE PRACTITIONER

## 2023-07-13 PROCEDURE — 99999 PR PBB SHADOW E&M-EST. PATIENT-LVL IV: ICD-10-PCS | Mod: PBBFAC,,, | Performed by: NURSE PRACTITIONER

## 2023-07-13 PROCEDURE — 3078F DIAST BP <80 MM HG: CPT | Mod: CPTII,S$GLB,, | Performed by: NURSE PRACTITIONER

## 2023-07-13 PROCEDURE — 3044F HG A1C LEVEL LT 7.0%: CPT | Mod: CPTII,S$GLB,, | Performed by: NURSE PRACTITIONER

## 2023-07-13 PROCEDURE — 36415 COLL VENOUS BLD VENIPUNCTURE: CPT | Performed by: INTERNAL MEDICINE

## 2023-07-13 PROCEDURE — 1159F PR MEDICATION LIST DOCUMENTED IN MEDICAL RECORD: ICD-10-PCS | Mod: CPTII,S$GLB,, | Performed by: NURSE PRACTITIONER

## 2023-07-13 PROCEDURE — 99999 PR PBB SHADOW E&M-EST. PATIENT-LVL IV: CPT | Mod: PBBFAC,,, | Performed by: NURSE PRACTITIONER

## 2023-07-13 PROCEDURE — 4010F ACE/ARB THERAPY RXD/TAKEN: CPT | Mod: CPTII,S$GLB,, | Performed by: NURSE PRACTITIONER

## 2023-07-13 PROCEDURE — 99215 PR OFFICE/OUTPT VISIT, EST, LEVL V, 40-54 MIN: ICD-10-PCS | Mod: S$GLB,,, | Performed by: NURSE PRACTITIONER

## 2023-07-13 PROCEDURE — 80053 COMPREHEN METABOLIC PANEL: CPT | Performed by: INTERNAL MEDICINE

## 2023-07-13 PROCEDURE — 99215 OFFICE O/P EST HI 40 MIN: CPT | Mod: S$GLB,,, | Performed by: NURSE PRACTITIONER

## 2023-07-13 PROCEDURE — 4010F PR ACE/ARB THEARPY RXD/TAKEN: ICD-10-PCS | Mod: CPTII,S$GLB,, | Performed by: NURSE PRACTITIONER

## 2023-07-13 RX ORDER — SODIUM CHLORIDE 0.9 % (FLUSH) 0.9 %
10 SYRINGE (ML) INJECTION
Status: CANCELLED | OUTPATIENT
Start: 2023-07-14

## 2023-07-13 RX ORDER — FAMOTIDINE 10 MG/ML
20 INJECTION INTRAVENOUS
Status: CANCELLED | OUTPATIENT
Start: 2023-07-14

## 2023-07-13 RX ORDER — EPINEPHRINE 0.3 MG/.3ML
0.3 INJECTION SUBCUTANEOUS ONCE AS NEEDED
Status: CANCELLED | OUTPATIENT
Start: 2023-07-14

## 2023-07-13 RX ORDER — DIPHENHYDRAMINE HYDROCHLORIDE 50 MG/ML
50 INJECTION INTRAMUSCULAR; INTRAVENOUS ONCE AS NEEDED
Status: CANCELLED | OUTPATIENT
Start: 2023-07-14

## 2023-07-13 RX ORDER — ATROPINE SULFATE 0.4 MG/ML
0.4 INJECTION, SOLUTION ENDOTRACHEAL; INTRAMEDULLARY; INTRAMUSCULAR; INTRAVENOUS; SUBCUTANEOUS ONCE AS NEEDED
Status: CANCELLED | OUTPATIENT
Start: 2023-07-14

## 2023-07-13 RX ORDER — LORATADINE 10 MG/1
10 TABLET ORAL DAILY
Qty: 30 TABLET | Refills: 3 | Status: SHIPPED | OUTPATIENT
Start: 2023-07-13 | End: 2024-07-12

## 2023-07-13 RX ORDER — HEPARIN 100 UNIT/ML
500 SYRINGE INTRAVENOUS
Status: CANCELLED | OUTPATIENT
Start: 2023-07-14

## 2023-07-13 RX ORDER — ACETAMINOPHEN 325 MG/1
650 TABLET ORAL
Status: CANCELLED | OUTPATIENT
Start: 2023-07-14

## 2023-07-13 NOTE — Clinical Note
Hi all! Patient moving to [place] and wants his next treatments alternating here and in [place]. Can we see about him getting his day 15 there [facility] [place]. He will also need labs that morning and a virtual with Dr. Linn prior to receiving (he knows this will need to be done form his car as needs to be in [place]. Can someone help with this?  Thanks,  JUSTYNA

## 2023-07-13 NOTE — PROGRESS NOTES
Subjective     Patient ID: Paul Li Jr. is a 47 y.o. male.    Chief Complaint: Malignant neoplasm involving both nipple and areola of righ    HPI  Presents for follow up and C3D1 Sacituzumab govitecan-hziy (Trodelvy).   He is now on day 1 and day 15 and planning PET after this cycle.     Moving to [place] next week and wants to start alternating treatments in [place].   Overall feels well  Pain after injection post chemo  No new pain issues.   Appetite good and weight stable.   Lymphedema about the same to right arm.  No CP/SOB  No n/v/d/c.   No f/c or other infectious complaints.   Neuropathy stable- no worse.   Asking about diabetic shoes- will discuss with established DM provider.  Wants a handicap tag and also thinking of getting a motorcycle.   Asking for claritin prescription.     Most recent imaging:  - 6/22/2023 PET scan:  FINDINGS:  Quality of the study: Adequate.  In the head and neck, there are no hypermetabolic lesions worrisome for malignancy. There are no hypermetabolic mucosal lesions, and there are no pathologically enlarged or hypermetabolic lymph nodes.  In the chest, postoperative changes of right mastectomy and right axillary lymph node dissection, with the right breast scar tissue demonstrating background tracer avidity.  Worsening mediastinal/hilar lymphadenopathy, with the subcarinal/right hilar lymph node measuring approximately 1.5 cm with SUV max 10 (series 3, image 78), previously 1.1 cm with SUV max 6.3.  Stable right lower lobe pulmonary nodule measuring 0.9 cm (series 3, image 91), previously 0.9 cm.  In the abdomen and pelvis, there is physiologic tracer distribution within the abdominal organs and excretion into the genitourinary system.  In the bones, there is overall worsening sclerosis and hypermetabolism throughout the skeleton.  Updated index lesions:  *Left anterior 5th rib with SUV max 7.4 (series 3, image 95), previously SUV max 3.1.  *T8 vertebral body demonstrates SUV max  11 (series 3, image 97), previously SUV max 3.8.  *L5 vertebral body demonstrates SUV max 9.2 (series 3, image 196), previously SUV max 7.2.  *Right anterior iliac crests with SUV max 7.2 (series 3, image 207), previously SUV max 2.5.  Increased uptake within the right acromioclavicular joint, favored to represent degenerative changes.  In the extremities, there are no hypermetabolic lesions worrisome for malignancy.  Incidental CT findings: Mucosal thickening of the maxillary sinuses.  Left chest wall port with the catheter tip terminating in the lower SVC.  Impression:  Findings suggestive of disease progression, noting worsening osseous metastatic disease and subcarinal/right hilar lymphadenopathy.  This report was flagged in Epic as abnormal.         Diagnosis:  Metastatic TNBC progressed (prior Stage IB ([id number]) triple negative invasive ductal carcinoma with lobular features of right breast, retroareolar, Grade 2, Ki67 80%, diagnosed 2019)     Oncology History:  Metastatic  Set up for scans (due to back pain) which were consistent for recurrence.   Imagin/3/2021 MRI T/L spine:  FINDINGS:  Alignment: Within normal limits.  Vertebrae: Low T1 signal intensity in the left T12 vertebral body extending to the left pedicle, S1 and S2 vertebral bodies with abnormal enhancement.  The vertebral heights are well maintained.  No evidence of acute fractures.  Abnormal appearance of the LEFT sacrum and ilium as well.  Discs: Degenerative disease of the level of L4-L5 with posterior annular fissure.  Conus appears within normal limits terminating at the level of the L2. level.  Cauda equina appears unremarkable.  Mild circumferential disc bulging at the level of T10-T11 abutting the ventral thecal sac.  No significant spinal canal stenosis or neural foraminal narrowing throughout the thoracic spine.  No significant spinal canal stenosis or neural foraminal narrowing throughout the lumbar spine.  Paraspinous muscles  and soft tissues: Subcentimeter focal enhancement in the posterior column along the supraspinous ligament at the level of L1-L2 (sagittal series 21, image 10) potentially inflammatory versus neoplastic.  Impression:  Abnormal appearance of the T12, S1, S2 vertebral bodies as well as LEFT sacrum and ilium concerning for metastatic disease in this patient with history of breast cancer.  No evidence for significant central canal narrowing.  Additional findings, as above.  This report was flagged in Epic as abnormal.     6/9/2021 PET scan:  COMPARISON:  MRI thoracic and lumbar spine 06/03/2021  FINDINGS:  Quality of the study: Adequate.  In the head and neck, there are no hypermetabolic lesions worrisome for malignancy. There are no hypermetabolic mucosal lesions, and there are no pathologically enlarged or hypermetabolic lymph nodes.  In the chest, there is postoperative change of right mastectomy/right axillary lymph node dissection.  There is low level hypermetabolic activity with mild soft tissue thickening in the skin/superficial subcutaneous fat of the right chest wall (axial fused image 78) with SUV max 3.6.  There is soft tissue thickening measuring approximately 1.8 x 7.9 cm in the subcutaneous fat of the right chest wall (axial series 3, image 84) with SUV max 3.1.  There are a few bilateral pulmonary nodules measuring up to 0.3 cm in the left lung (axial series 3, image 88) and 0.5 cm in the right lung (axial series 3, image 84).  These nodules are too small to characterize by PET.  There are no pathologically enlarged or hypermetabolic lymph nodes.  In the abdomen and pelvis, there is physiologic tracer distribution within the abdominal organs and excretion into the genitourinary system.  Subtle sen mesentery and multiple prominent mesenteric lymph nodes measuring up to 1.8 cm in long axis with background activity.  In the bones, there are several hypermetabolic lesions worrisome for malignancy.  Sclerotic  lesion in the left T12 vertebral body (axial series 3, image 131) with SUV max 12.  Sclerotic lesions in the sacrum (for example axial series 3, image 188) with SUV max 9.7.  Sclerotic lesions in the left iliac bone (for example axial series 3, image 205) with SUV max 6.  In the extremities, there are no hypermetabolic lesions worrisome for malignancy.  Incidental findings:  Bilateral maxillary antra mucous retention cysts.  Fat containing right inguinal hernia.  Impression:  1. In this patient with right breast carcinoma status post mastectomy/right axillary lymph node dissection, there are multiple sclerotic lesions in the T12 vertebral body, sacrum, and left iliac bone with hypermetabolic activity concerning for active metastatic disease and in keeping with findings on MRI 06/03/2021.  2. Additionally there is low-level hypermetabolic activity with soft tissue thickening in the right chest wall as detailed above.  These findings are nonspecific and may be related to postoperative/post radiation change, with recurrent malignancy not excluded.  3. Nonspecific mesenteric haziness and lymph nodes which may indicate mesenteric adenitis.  Recommend clinical correlation and attention on follow-up.  4. Additional findings as above.  I, Sohan Tillman MD, attest that I reviewed and interpreted the images.     In summary,   Abraxane C1 started 8/13/2021  Started aplelisib 8/22/2021   We had sent Guardant and discussed results with Molecular tumor board  He also had a repeat bx to confirm metastatic triple negative breast cancer  Plan:   Nab-Paclitaxel and Alpelisib  Reference: https://ascopubs.org/doi/abs/10.1200/[email or web address]  Also on Zometa-      Hydrocele repaired.        - 3/29/2023 MRI sacrum/coccyx:  FINDINGS:  There is abnormal T1 hypointense signal throughout the sacrum as well as the visualized left hemipelvis.  Multiple discrete foci also noted within the right partially visualized iliac bone.  These are  accompanied by heterogeneous T2 signal as well as contrast enhancement and in keeping with osseous metastatic disease.  No evidence for epidural extension into the sacral canal or involvement of the sacral foramina.  No visualized soft tissue lesions.  No fracture.  Nonspecific mild soft tissue edema noted within the bilateral gluteal minimus, piriformis and left iliacus muscles.  Mild degenerative changes are noted in the sacroiliac joints.  No pelvic ascites or lymphadenopathy seen.  Visualized sciatic nerves are unremarkable.  Impression:  Extensive osseous metastatic disease of the pelvis involving much of the sacrum.  No evidence for invasion of the sacral canal or neural foramina.     - MRI L-spine:  FINDINGS:  Alignment: Normal.  Vertebrae:  Numerous T1 hypointense, STIR hyperintense, enhancing lesions are seen throughout the lumbar spine..  No fracture.  No epidural extension.  Discs: Disc desiccation and mild height loss of L4-5.  Posterior annular fissure of L4-5.  Remaining discs appear normal in height and signal.  No evidence for discitis.  Cord: Normal.  Conus terminates at L2  Degenerative findings:  T12-L1: No spinal canal stenosis or neural foraminal narrowing.  L1-L2: No spinal canal stenosis or neural foraminal narrowing.  L2-L3: Mild facet arthropathy.  No spinal canal stenosis or neural foraminal narrowing.  L3-L4: Mild facet arthropathy.  No spinal canal stenosis or neural foraminal narrowing.  L4-L5: Circumferential disc bulge and moderate facet arthropathy.  No spinal canal stenosis or neural foraminal narrowing.  L5-S1: Mild facet arthropathy.  No spinal canal stenosis or neural foraminal narrowing.  Paraspinal muscles & soft tissues: Mild paraspinal muscle atrophy.  Impression:  Redemonstration of known diffuse osseous metastatic disease throughout the lumbar spine.  When compared to recent PET-CT, findings appear stable but progressed from prior MRI on 06/03/2021.  No fracture. No epidural  extension.     - 3/20/2023 PET scan:  FINDINGS:  Quality of the study: Adequate.  In the head and neck, there are no hypermetabolic lesions worrisome for malignancy. There are no hypermetabolic mucosal lesions, and there are no pathologically enlarged or hypermetabolic lymph nodes.  In the chest, there is postoperative change of right mastectomy and right axillary lymph node dissection.  There is a new 1.1 cm right hilar lymph node (3-101) with max SUV 6.3 (603-99).  There is a right lower lobe pulmonary nodule measuring 0.9 cm, which does not show increased tracer uptake, though has been gradually enlarging (measured 0.3 cm on 06/09/2021) (3-106).  In the abdomen and pelvis, there is physiologic tracer distribution within the abdominal organs and excretion into the genitourinary system.  In the bones, there are numerous sclerotic lesions, with few index lesions detailed below:  Sclerotic lesion in T7 with tracer uptake mildly increased prior exam with SUV max 5.8 (previously 5.2) (3-107) (603-109).  T12 sclerotic focus demonstrates an SUV max of 5.9 (previously 4.8) (3-153, 603-153).  L5 vertebral body sclerotic lesion with an SUV max of 6.8 (previously 8.7) (3-213).  Newly hypermetabolic focus at the inferior aspect of the left scapula with SUV max of 7.4 (603-103).  Increased size and uptake of a left ischial tuberosity sclerotic lesion with SUV max of 7.3 (3-266).  Increased size of a L4 vertebral body sclerotic lesion (3-200).  Extensive sclerotic change through the pelvic bones grossly stable compared to the prior exam.  CT: Right maxillary mucosal retention cysts.  Left chest wall Port-A-Cath.  Postoperative change of right mastectomy with right axillary lymph node dissection.  Small bilateral fat containing inguinal hernias.  Impression:  In this patient with breast cancer status post right mastectomy and right axillary lymph node dissection there has been progression of disease.  Newly tracer avid right  hilar lymph node consistent with derick metastasis.  In the bones there are numerous sclerotic lesions, some of which demonstrate new abnormal uptake on FDG PET.  Suspicious right lower lobe pulmonary nodule does not show increase tracer uptake, though it is been gradually enlarging, and attention on follow-up is advised.  This report was flagged in Epic as abnormal.     Started Trodelvy 5/19/2023.   Note- did not receive day 8 with cycle 1.     Prior Oncology History:          Oncology History   Malignant neoplasm involving both nipple and areola of right breast in male, estrogen receptor negative   5/1/2019 Imaging Significant Findings     Mammogram and US  Impression:  Right  Mass: Right breast 14 mm mass at the retroareolar anterior position. Assessment: 4 - Suspicious finding. Biopsy is recommended.   Left  There is no mammographic or sonographic evidence of malignancy.  Recommendation:  Biopsy is recommended.      5/2/2019 Biopsy     BREAST, RIGHT, RETROAREOLAR MASS, ULTRASOUND-GUIDED BIOPSY:  Invasive ductal carcinoma with lobular features.  Size of invasive carcinoma: 5 MM in greatest linear dimension within a single      5/2/2019 Breast Tumor Markers     Estrogen: Negative  Progesterone: Negative  HER2: Negative  Ki67: 80      5/17/2019 Cancer Staged     Staging form: Breast, AJCC 8th Edition  - Clinical stage from 5/17/2019: Stage IB (cT1c, cN0, cM0, G2, ER-, IN-, HER2-) - Signed by Richa Bahena MD on 5/17/2019 5/27/2019 - 7/21/2019 Chemotherapy     Treatment Summary   Plan Name: OP BREAST DOSE-DENSE AC - DOXORUBICIN CYCLOPHOSPHAMIDE Q2W  Treatment Goal: Curative  Status: Inactive  Start Date: 5/27/2019  End Date: 7/9/2019  Provider: Richa Bahena MD  Chemotherapy: DOXOrubicin chemo injection 186 mg, 60 mg/m2 = 186 mg, Intravenous, Clinic/HOD 1 time, 4 of 4 cycles  Administration: 186 mg (5/27/2019), 186 mg (6/10/2019), 186 mg (6/24/2019), 186 mg (7/8/2019)  cyclophosphamide (CYTOXAN) 600  mg/m2 = 1,865 mg in sodium chloride 0.9% 250 mL chemo infusion, 600 mg/m2 = 1,865 mg, Intravenous, Clinic/HOD 1 time, 4 of 4 cycles  Administration: 1,865 mg (5/27/2019), 1,865 mg (6/10/2019), 1,865 mg (6/24/2019), 1,865 mg (7/8/2019)      7/22/2019 - 10/15/2019 Chemotherapy     Treatment Summary   Plan Name: OP PACLITAXEL QW  Treatment Goal: Curative  Status: Inactive  Start Date: 7/24/2019  End Date: 10/8/2019  Provider: Richa Bahena MD  Chemotherapy: PACLitaxel (TAXOL) 80 mg/m2 = 246 mg in sodium chloride 0.9% 250 mL chemo infusion, 80 mg/m2 = 246 mg, Intravenous, Clinic/HOD 1 time, 12 of 12 cycles  Dose modification: 60 mg/m2 (original dose 80 mg/m2, Cycle 3), 55 mg/m2 (original dose 80 mg/m2, Cycle 7), 60 mg/m2 (original dose 80 mg/m2, Cycle 7), 50 mg/m2 (original dose 80 mg/m2, Cycle 9), 50 mg/m2 (original dose 80 mg/m2, Cycle 10)  Administration: 246 mg (7/24/2019), 246 mg (7/31/2019), 186 mg (8/7/2019), 186 mg (8/14/2019), 186 mg (8/21/2019), 186 mg (8/28/2019), 186 mg (9/4/2019), 174 mg (9/11/2019), 156 mg (9/18/2019), 156 mg (9/25/2019), 156 mg (10/1/2019), 156 mg (10/8/2019)      11/22/2019 Breast Surgery     On 11/22/19 Dr whyte performed a right breast mastectomy with SLN biopsy with pathology showing grade 2, 6 mm IDC with extensive treatment effect, no LVI with 1 LN positive 4 mm, negative margins. The on 12/17/19, Dr Whyte performed right axillary dissection with pathology still pending.      11/22/2019 Cancer Staged     ypT1b N1      12/17/2019 Breast Surgery     Surgery: Dr Shima Whyte, [phone] performed right ALND with pathology showing 14 negative lymph nodes.             1/14/2020 Tumor Conference      Post mastectomy radiation therapy: Xeloda, then possible Keytruda trial .      2/3/2020 - 3/23/2020 Radiation Therapy     Treating physician: Speedy Nair MD   Total Dose: 50.4 Gy to the chest wall and regional lymphatics  10 Gy boost to the prostate.   Fractions: 28 fractions  at 1.8 Gy per fraction  5 fractions at 2 Gy per fraction       2/28/2020 -  Chemotherapy     * 4/15/2020 - 9/28/20 completed 6 cycles adjuvant Xeloda 1000 mg/m2 bid days 1-14 every 21 days  Treatment Summary   Plan Name: OP CAPECITABINE 1250MG/M2 BID Q3W  Treatment Goal: Curative  Status: Active  Start Date: 3/20/2020  End Date: 3/20/2020  Provider: Richa Bahena MD  Chemotherapy: [No matching medication found in this treatment plan]       Review of Systems   Constitutional:         See Above   All other systems reviewed and are negative.       Objective     Physical Exam  Vitals and nursing note reviewed.   Constitutional:       General: He is not in acute distress.     Appearance: Normal appearance. He is well-developed. He is obese. He is not diaphoretic.      Comments: Presents with spouse  Very pleasant  ECOG= 0   HENT:      Head: Normocephalic and atraumatic.   Eyes:      General: No scleral icterus.     Extraocular Movements: Extraocular movements intact.      Conjunctiva/sclera: Conjunctivae normal.      Pupils: Pupils are equal, round, and reactive to light.   Neck:      Thyroid: No thyromegaly.      Trachea: No tracheal deviation.   Cardiovascular:      Rate and Rhythm: Normal rate and regular rhythm.      Heart sounds: Normal heart sounds. No murmur heard.    No friction rub. No gallop.   Pulmonary:      Effort: Pulmonary effort is normal. No respiratory distress.      Breath sounds: Normal breath sounds. No wheezing, rhonchi or rales.      Comments: Right chest wall negative.   No LAD  Chest:      Chest wall: No tenderness.   Abdominal:      General: Bowel sounds are normal. There is no distension.      Palpations: Abdomen is soft. There is no mass.      Tenderness: There is no abdominal tenderness. There is no guarding or rebound.      Comments: No organomegaly   Musculoskeletal:         General: Swelling (chronic RUE lymphedema; wearing compression) present. No tenderness or deformity. Normal range  of motion.      Cervical back: Normal range of motion and neck supple. No rigidity or tenderness.      Right lower leg: No edema.      Left lower leg: No edema.      Comments: No spinal or paraspinal tenderness.    Lymphadenopathy:      Cervical: No cervical adenopathy.   Skin:     General: Skin is warm and dry.      Coloration: Skin is not jaundiced or pale.      Findings: No erythema or rash.   Neurological:      General: No focal deficit present.      Mental Status: He is alert and oriented to person, place, and time. Mental status is at baseline.      Sensory: Sensory deficit present.      Motor: No weakness or abnormal muscle tone.      Coordination: Coordination normal.      Gait: Gait normal.      Deep Tendon Reflexes: Reflexes are normal and symmetric. Reflexes normal.   Psychiatric:         Mood and Affect: Mood normal.         Behavior: Behavior normal.         Thought Content: Thought content normal.         Judgment: Judgment normal.          Assessment and Plan     1. Malignant neoplasm involving both nipple and areola of right breast in male, estrogen receptor negative  Overview:  1. Stage IB ([id number]) invasive ductal carcinoma with lobular features of right breast, retroareolar, ER neg, RI neg, Her2 neg, Grade 2, Ki67 80%, diagnosed 5/2019   * Genetics negative   * 5/27/19 initiated neoadjuvant ddAC followed by weekly Taxol.     Orders:  -     CBC Oncology; Future  -     Comprehensive Metabolic Panel; Future    2. Bone metastases  Overview:  IMO Regualtory Update 4/1/23      3. Lymphedema  -     COMPRESSION SLEEVE/SOCK FOR HOME USE    4. Bone pain due to G-CSF  -     loratadine (CLARITIN) 10 mg tablet; Take 1 tablet (10 mg total) by mouth once daily.  Dispense: 30 tablet; Refill: 3    5. Neuropathy    6. Adjustment disorder with mixed anxiety and depressed mood  Overview:  Secondary to new breast cancer diagnosis.       7. Hypertension associated with diabetes  Overview:  - Managed by PCP      8.  Chemotherapy-induced neutropenia    9. Anemia associated with chemotherapy    Other orders  -     acetaminophen tablet 650 mg  -     diphenhydrAMINE (BENADRYL) 25 mg in sodium chloride 0.9% 50 mL IVPB  -     famotidine (PF) injection 20 mg  -     aprepitant (CINVANTI) injection 130 mg  -     palonosetron (ALOXI) 0.25 mg with Dexamethasone (DECADRON) 12 mg in NS 50 mL IVPB  -     sacituzumab govitecan-hziy (TRODELVY) 1,480 mg in sodium chloride 0.9% 500 mL chemo infusion  -     atropine injection 0.4 mg  -     EPINEPHrine (EPIPEN) 0.3 mg/0.3 mL pen injection 0.3 mg  -     diphenhydrAMINE injection 50 mg  -     hydrocortisone sodium succinate injection 100 mg  -     sodium chloride 0.9% 100 mL flush bag  -     sodium chloride 0.9% flush 10 mL  -     heparin, porcine (PF) 100 unit/mL injection flush 500 Units  -     alteplase injection 2 mg  -     pegfilgrastim-bmez (ZIEXTENZO) injection 6 mg      Clinically stable and doing well.   Labs reviewed and adequate. Will monitor.   Ok to proceed with C3D1 Sacituzumab govitecan-hziy (Trodelvy).   Needs growth factor scheduled for day 2.  Neulasta OBI for next treatment as will be getting in [place] and this will allow him to go back home to [place] immediately after treatment. Claritin sent as requested.  Zometa monthly. Due tomorrow.   Rx lymphedema sleex  Rx handicap tag  Would avoid motorcycles.   Scan after this cycle.     RTC 2 weeks to see Dr. Linn with cbc, cmp and for treatment. Message to nurses as EP can be done virtually in [place] prior to infusion.     See messages sent to Carmen Leung Lorrie, and Andrea    PDL1 sent       Route Chart for Scheduling    Med Onc Chart Routing      Follow up with physician . 2 weeks- message to Nguyen Sun as this will be virtual; 4 weeks appt in person   Follow up with JAC    Infusion scheduling note    Injection scheduling note    Labs    Imaging    Pharmacy appointment    Other referrals            Treatment  Plan Information   OP SACITUZUMAB GOVITECAN-HZIY D1,15 Q4W (CHANGED IN C2)   Rosa Linn MD   Upcoming Treatment Dates - OP SACITUZUMAB GOVITECAN-HZIY D1,15 Q4W (CHANGED IN C2)    7/14/2023       Pre-Medications       acetaminophen tablet 650 mg       diphenhydrAMINE (BENADRYL) 25 mg in sodium chloride 0.9% 50 mL IVPB       famotidine (PF) injection 20 mg       Chemotherapy       sacituzumab govitecan-hziy (TRODELVY) 1,480 mg in sodium chloride 0.9% 500 mL chemo infusion       Supportive Care       atropine injection 0.4 mg       Antiemetics       aprepitant (CINVANTI) injection 130 mg       palonosetron (ALOXI) 0.25 mg with Dexamethasone (DECADRON) 12 mg in NS 50 mL IVPB  7/15/2023       Growth Factor       pegfilgrastim-bmez (ZIEXTENZO) injection 6 mg  7/28/2023       Pre-Medications       acetaminophen tablet 650 mg       diphenhydrAMINE (BENADRYL) 25 mg in sodium chloride 0.9% 50 mL IVPB       famotidine (PF) injection 20 mg       Chemotherapy       sacituzumab govitecan-hziy (TRODELVY) 1,480 mg in sodium chloride 0.9% 500 mL chemo infusion       Supportive Care       atropine injection 0.4 mg       Antiemetics       aprepitant (CINVANTI) injection 130 mg       palonosetron (ALOXI) 0.25 mg with Dexamethasone (DECADRON) 12 mg in NS 50 mL IVPB  7/29/2023       Growth Factor       pegfilgrastim-bmez (ZIEXTENZO) injection 6 mg    Supportive Plan Information  OP FILGRASTIM 480 MCG   Michelle Grayson NP   Upcoming Treatment Dates - OP FILGRASTIM 480 MCG    No upcoming days in selected categories.    Therapy Plan Information  PORT FLUSH  Flushes  heparin, porcine (PF) 100 unit/mL injection flush 500 Units  500 Units, Intravenous, Every visit  sodium chloride 0.9% flush 10 mL  10 mL, Intravenous, Every visit    ZOLEDRONIC ACID (ZOMETA) IV  Medications  zoledronic 4 mg/100 mL infusion 4 mg  4 mg, Intravenous, Every 4 weeks  Flushes  sodium chloride 0.9% 250 mL flush bag  Intravenous, Every 4 weeks  sodium chloride  0.9% flush 10 mL  10 mL, Intravenous, Every 4 weeks  heparin, porcine (PF) 100 unit/mL injection flush 500 Units  500 Units, Intravenous, Every 4 weeks  alteplase injection 2 mg  2 mg, Intra-Catheter, Every 4 weeks           No follow-ups on file.  Patient is in agreement with the proposed treatment plan. All questions were answered to the patient's satisfaction. Pt knows to call clinic for any new or worsening symptoms and if anything is needed before the next clinic visit.      OZIEL Jefferson-AILYN  Hematology & Oncology  [address] [place] [id number]  ph. [phone]  Fax. [phone]       45 minutes of total time spent on the encounter, which includes face to face time and non-face to face time preparing to see the patient (eg, review of tests), Obtaining and/or reviewing separately obtained history, Documenting clinical information in the electronic or other health record, Independently interpreting results (not separately reported) and communicating results to the patient/family/caregiver, or Care coordination (not separately reported).

## 2023-07-13 NOTE — Clinical Note
Hi there! Patient has new insurance and would like neulasta OBI with his next treatment tin 2 weeks. Can you add this please?

## 2023-07-14 ENCOUNTER — INFUSION (OUTPATIENT)
Dept: INFUSION THERAPY | Facility: HOSPITAL | Age: 48
End: 2023-07-14
Attending: INTERNAL MEDICINE
Payer: MEDICARE

## 2023-07-14 VITALS
OXYGEN SATURATION: 97 % | SYSTOLIC BLOOD PRESSURE: 116 MMHG | BODY MASS INDEX: 39.17 KG/M2 | TEMPERATURE: 98 F | HEART RATE: 73 BPM | DIASTOLIC BLOOD PRESSURE: 55 MMHG | WEIGHT: 315 LBS | HEIGHT: 75 IN | RESPIRATION RATE: 18 BRPM

## 2023-07-14 DIAGNOSIS — G89.3 NEOPLASM RELATED PAIN: ICD-10-CM

## 2023-07-14 DIAGNOSIS — E11.65 UNCONTROLLED TYPE 2 DIABETES MELLITUS WITH HYPERGLYCEMIA: ICD-10-CM

## 2023-07-14 DIAGNOSIS — Z17.1 MALIGNANT NEOPLASM INVOLVING BOTH NIPPLE AND AREOLA OF RIGHT BREAST IN MALE, ESTROGEN RECEPTOR NEGATIVE: Primary | ICD-10-CM

## 2023-07-14 DIAGNOSIS — C50.021 MALIGNANT NEOPLASM INVOLVING BOTH NIPPLE AND AREOLA OF RIGHT BREAST IN MALE, ESTROGEN RECEPTOR NEGATIVE: Primary | ICD-10-CM

## 2023-07-14 DIAGNOSIS — C79.51 MALIGNANT NEOPLASM METASTATIC TO BONE: ICD-10-CM

## 2023-07-14 PROCEDURE — 25000003 PHARM REV CODE 250: Performed by: NURSE PRACTITIONER

## 2023-07-14 PROCEDURE — 96413 CHEMO IV INFUSION 1 HR: CPT

## 2023-07-14 PROCEDURE — 96375 TX/PRO/DX INJ NEW DRUG ADDON: CPT

## 2023-07-14 PROCEDURE — 63600175 PHARM REV CODE 636 W HCPCS: Performed by: INTERNAL MEDICINE

## 2023-07-14 PROCEDURE — 63600175 PHARM REV CODE 636 W HCPCS: Performed by: NURSE PRACTITIONER

## 2023-07-14 PROCEDURE — 96367 TX/PROPH/DG ADDL SEQ IV INF: CPT

## 2023-07-14 PROCEDURE — A4216 STERILE WATER/SALINE, 10 ML: HCPCS | Performed by: NURSE PRACTITIONER

## 2023-07-14 RX ORDER — FAMOTIDINE 10 MG/ML
20 INJECTION INTRAVENOUS
Status: COMPLETED | OUTPATIENT
Start: 2023-07-14 | End: 2023-07-14

## 2023-07-14 RX ORDER — SODIUM CHLORIDE 0.9 % (FLUSH) 0.9 %
10 SYRINGE (ML) INJECTION
Status: DISCONTINUED | OUTPATIENT
Start: 2023-07-14 | End: 2023-07-14 | Stop reason: HOSPADM

## 2023-07-14 RX ORDER — METFORMIN HYDROCHLORIDE 500 MG/1
1000 TABLET ORAL 2 TIMES DAILY WITH MEALS
Qty: 120 TABLET | Refills: 2 | Status: SHIPPED | OUTPATIENT
Start: 2023-07-14 | End: 2023-12-18 | Stop reason: SDUPTHER

## 2023-07-14 RX ORDER — ACETAMINOPHEN 325 MG/1
650 TABLET ORAL
Status: COMPLETED | OUTPATIENT
Start: 2023-07-14 | End: 2023-07-14

## 2023-07-14 RX ORDER — ATROPINE SULFATE 0.4 MG/ML
0.4 INJECTION, SOLUTION ENDOTRACHEAL; INTRAMEDULLARY; INTRAMUSCULAR; INTRAVENOUS; SUBCUTANEOUS ONCE AS NEEDED
Status: DISCONTINUED | OUTPATIENT
Start: 2023-07-14 | End: 2023-07-14 | Stop reason: HOSPADM

## 2023-07-14 RX ORDER — HEPARIN 100 UNIT/ML
500 SYRINGE INTRAVENOUS
Status: CANCELLED | OUTPATIENT
Start: 2023-08-11

## 2023-07-14 RX ORDER — EPINEPHRINE 0.3 MG/.3ML
0.3 INJECTION SUBCUTANEOUS ONCE AS NEEDED
Status: DISCONTINUED | OUTPATIENT
Start: 2023-07-14 | End: 2023-07-14 | Stop reason: HOSPADM

## 2023-07-14 RX ORDER — HEPARIN 100 UNIT/ML
500 SYRINGE INTRAVENOUS
Status: DISCONTINUED | OUTPATIENT
Start: 2023-07-14 | End: 2023-07-14 | Stop reason: HOSPADM

## 2023-07-14 RX ORDER — SODIUM CHLORIDE 0.9 % (FLUSH) 0.9 %
10 SYRINGE (ML) INJECTION
Status: CANCELLED | OUTPATIENT
Start: 2023-08-11

## 2023-07-14 RX ORDER — DIPHENHYDRAMINE HYDROCHLORIDE 50 MG/ML
50 INJECTION INTRAMUSCULAR; INTRAVENOUS ONCE AS NEEDED
Status: DISCONTINUED | OUTPATIENT
Start: 2023-07-14 | End: 2023-07-14 | Stop reason: HOSPADM

## 2023-07-14 RX ORDER — ZOLEDRONIC ACID 0.04 MG/ML
4 INJECTION, SOLUTION INTRAVENOUS
Status: CANCELLED | OUTPATIENT
Start: 2023-08-11

## 2023-07-14 RX ORDER — ZOLEDRONIC ACID 0.04 MG/ML
4 INJECTION, SOLUTION INTRAVENOUS
Status: COMPLETED | OUTPATIENT
Start: 2023-07-14 | End: 2023-07-14

## 2023-07-14 RX ADMIN — DIPHENHYDRAMINE HYDROCHLORIDE 25 MG: 50 INJECTION, SOLUTION INTRAMUSCULAR; INTRAVENOUS at 08:07

## 2023-07-14 RX ADMIN — ACETAMINOPHEN 650 MG: 325 TABLET ORAL at 08:07

## 2023-07-14 RX ADMIN — Medication 10 ML: at 11:07

## 2023-07-14 RX ADMIN — APREPITANT 130 MG: 130 INJECTION, EMULSION INTRAVENOUS at 09:07

## 2023-07-14 RX ADMIN — SACITUZUMAB GOVITECAN 1440 MG: 180 POWDER, FOR SOLUTION INTRAVENOUS at 10:07

## 2023-07-14 RX ADMIN — FAMOTIDINE 20 MG: 10 INJECTION INTRAVENOUS at 09:07

## 2023-07-14 RX ADMIN — ZOLEDRONIC ACID 4 MG: 0.04 INJECTION, SOLUTION INTRAVENOUS at 09:07

## 2023-07-14 RX ADMIN — DEXAMETHASONE SODIUM PHOSPHATE 0.25 MG: 4 INJECTION, SOLUTION INTRA-ARTICULAR; INTRALESIONAL; INTRAMUSCULAR; INTRAVENOUS; SOFT TISSUE at 09:07

## 2023-07-14 RX ADMIN — HEPARIN 500 UNITS: 100 SYRINGE at 11:07

## 2023-07-14 RX ADMIN — SODIUM CHLORIDE: 9 INJECTION, SOLUTION INTRAVENOUS at 08:07

## 2023-07-14 NOTE — PROGRESS NOTES
Adherence packed for 28 days using Dispill:      Morning pockets:  Fluoxetine 40 mg (2 capsules)  Gabapentin 300 mg (3 capsules)  HCTZ 25 mg  Losartan 50 mg (2 tablets)  Metformin 500 mg (2 tablets)  Oyster shell calcium + Vitamin D3 500 mg - 5 mcg (200 unit)    Noon pockets:  Gabapentin 300 mg (3 capsules)    Evening pockets:  Amlodipine 10 mg  Gabapentin 300 mg (3 capsules)  Metformin 500 mg (2 tablets)  Oyster shell calcium + Vitamin D3 500 mg - 5 mcg (200 unit)  Trazodone 100 mg

## 2023-07-14 NOTE — PLAN OF CARE
1150  Patient completed Trodelvy and Zometa infusions, tolerated well.  Port deaccessed without issue, flushed, blood return noted , heparin locked.  RTC 7/15/23  Patient ambulated off floor accompanied by wife.

## 2023-07-14 NOTE — PLAN OF CARE
0800  Patient seated in chair accompanied by wife , VSS, assessment done.  Port accessed without isssue, flushed, blood return noted, started NS @ 50 cc/hr KVO while waiting for Trodelvy & Zometa from pharmacy.  [facility] for safety

## 2023-07-15 ENCOUNTER — INFUSION (OUTPATIENT)
Dept: INFUSION THERAPY | Facility: HOSPITAL | Age: 48
End: 2023-07-15
Attending: INTERNAL MEDICINE
Payer: MEDICARE

## 2023-07-15 VITALS
HEART RATE: 68 BPM | SYSTOLIC BLOOD PRESSURE: 122 MMHG | OXYGEN SATURATION: 99 % | DIASTOLIC BLOOD PRESSURE: 58 MMHG | TEMPERATURE: 98 F | RESPIRATION RATE: 18 BRPM

## 2023-07-15 DIAGNOSIS — Z17.1 MALIGNANT NEOPLASM INVOLVING BOTH NIPPLE AND AREOLA OF RIGHT BREAST IN MALE, ESTROGEN RECEPTOR NEGATIVE: Primary | ICD-10-CM

## 2023-07-15 DIAGNOSIS — C50.021 MALIGNANT NEOPLASM INVOLVING BOTH NIPPLE AND AREOLA OF RIGHT BREAST IN MALE, ESTROGEN RECEPTOR NEGATIVE: Primary | ICD-10-CM

## 2023-07-15 DIAGNOSIS — C79.51 MALIGNANT NEOPLASM METASTATIC TO BONE: ICD-10-CM

## 2023-07-15 PROCEDURE — 63600175 PHARM REV CODE 636 W HCPCS: Mod: JZ,JG | Performed by: NURSE PRACTITIONER

## 2023-07-15 PROCEDURE — 96372 THER/PROPH/DIAG INJ SC/IM: CPT

## 2023-07-15 RX ADMIN — PEGFILGRASTIM-BMEZ 6 MG: 6 INJECTION SUBCUTANEOUS at 10:07

## 2023-07-15 NOTE — PLAN OF CARE
1036 Patient tolerated Ziextenzo SQ to left abd without incident. Vitals stable. Reports no new concerns. Patient's next appointment to be scheduled. To contact provider with questions or concerns. D/C ambulatory and stable.

## 2023-07-17 RX ORDER — IBUPROFEN 600 MG/1
600 TABLET ORAL EVERY 8 HOURS PRN
Qty: 20 TABLET | Refills: 0 | Status: SHIPPED | OUTPATIENT
Start: 2023-07-17

## 2023-07-22 ENCOUNTER — PATIENT MESSAGE (OUTPATIENT)
Dept: HEMATOLOGY/ONCOLOGY | Facility: CLINIC | Age: 48
End: 2023-07-22
Payer: MEDICARE

## 2023-07-27 ENCOUNTER — TELEPHONE (OUTPATIENT)
Dept: HEMATOLOGY/ONCOLOGY | Facility: CLINIC | Age: 48
End: 2023-07-27
Payer: MEDICARE

## 2023-07-27 NOTE — TELEPHONE ENCOUNTER
Patient moved to [place] and would like to get treatments in [place] and here in [place] ([facility]). This would be difficult and may delay care as authorization would need to be done prior to each treatment.     Called and spoke to him. He is ok with establishing care with an oncologist in [place] and having treatments there. He would like to see Dr. Linn when scans are due as he wants her to facilitate care.   I think this is doable but we need to get him scheduled with someone in [facility] as was due for treatment this week.   Eugenie, are you able to help get him an appointment in [facility]? Let me know if I need to reach out to a specific person or provider.   Thanks  PJ

## 2023-07-31 ENCOUNTER — PATIENT MESSAGE (OUTPATIENT)
Dept: HEMATOLOGY/ONCOLOGY | Facility: CLINIC | Age: 48
End: 2023-07-31
Payer: MEDICARE

## 2023-07-31 DIAGNOSIS — C50.021 MALIGNANT NEOPLASM INVOLVING BOTH NIPPLE AND AREOLA OF RIGHT BREAST IN MALE, ESTROGEN RECEPTOR NEGATIVE: Primary | ICD-10-CM

## 2023-07-31 DIAGNOSIS — Z17.1 MALIGNANT NEOPLASM INVOLVING BOTH NIPPLE AND AREOLA OF RIGHT BREAST IN MALE, ESTROGEN RECEPTOR NEGATIVE: Primary | ICD-10-CM

## 2023-08-01 RX ORDER — HYDROCODONE BITARTRATE AND ACETAMINOPHEN 10; 325 MG/1; MG/1
1 TABLET ORAL EVERY 6 HOURS PRN
Qty: 90 TABLET | Refills: 0 | Status: SHIPPED | OUTPATIENT
Start: 2023-08-01 | End: 2023-08-08 | Stop reason: SDUPTHER

## 2023-08-03 DIAGNOSIS — C80.1 ANXIETY ASSOCIATED WITH CANCER DIAGNOSIS: ICD-10-CM

## 2023-08-03 DIAGNOSIS — F41.1 ANXIETY ASSOCIATED WITH CANCER DIAGNOSIS: ICD-10-CM

## 2023-08-04 RX ORDER — ALPRAZOLAM 0.5 MG/1
0.5 TABLET ORAL 3 TIMES DAILY PRN
Qty: 60 TABLET | Refills: 0 | Status: SHIPPED | OUTPATIENT
Start: 2023-08-04 | End: 2023-08-09 | Stop reason: SDUPTHER

## 2023-08-07 ENCOUNTER — TELEPHONE (OUTPATIENT)
Dept: HEMATOLOGY/ONCOLOGY | Facility: CLINIC | Age: 48
End: 2023-08-07
Payer: MEDICARE

## 2023-08-07 NOTE — TELEPHONE ENCOUNTER
----- Message from Ger Richardson RN sent at 8/3/2023  7:47 AM CDT -----  Regarding: RE: scheduling for D1 TRODELVY AND D2 ZIEXTENZO  Did you receive any responses about this patient's treatment. Is he on the wait list here?  ----- Message -----  From: Carmen Jimenez RN  Sent: 7/28/2023   4:07 PM CDT  To: Ger Richardson RN  Subject: FW: scheduling for D1 TRODELVY AND D2 ZIEXTE#    We need help getting this pt treatment for next week    ----- Message -----  From: Elva Lowery RN  Sent: 7/28/2023   3:58 PM CDT  To: Carmen Jimenez RN  Subject: RE: scheduling for D1 TRODELVY AND D2 ZIEXTE#    Shanelle Morales, we do not have anything available at [facility] next week     -Elva   ----- Message -----  From: Carmen Jimenez RN  Sent: 7/27/2023   9:39 AM CDT  To: [facility]  Subject: FW: scheduling for D1 TRODELVY AND D2 ZIEXTE#    Is there anyway we can get this pt treated at any location for D1 TRODELVY AND D2 ZIEXTENZO. Please advise. Jaswinder Cobb     ----- Message -----  From: Jordyn Mckinney  Sent: 7/26/2023  10:01 AM CDT  To: Temitope Altman  Subject: FW: scheduling                                     ----- Message -----  From: Tanja Beauchamp RN  Sent: 7/26/2023   8:30 AM CDT  To: Jordyn Mckinney  Subject: RE: scheduling                                   We have no availability this week.      ----- Message -----  From: Jordyn Mckinney  Sent: 7/25/2023  10:32 AM CDT  To: Carmen Jimenez RN, [facility], #  Subject: scheduling                                       Good Morning all,      Is there anyway we can get this pt treated at any location for D1 TRODELVY AND D2 ZIEXTENZO. Please advise. Thanks   Jordyn

## 2023-08-08 ENCOUNTER — TELEPHONE (OUTPATIENT)
Dept: HEMATOLOGY/ONCOLOGY | Facility: CLINIC | Age: 48
End: 2023-08-08
Payer: MEDICARE

## 2023-08-08 ENCOUNTER — TELEPHONE (OUTPATIENT)
Dept: PALLIATIVE MEDICINE | Facility: CLINIC | Age: 48
End: 2023-08-08
Payer: MEDICARE

## 2023-08-08 ENCOUNTER — PATIENT MESSAGE (OUTPATIENT)
Dept: HEMATOLOGY/ONCOLOGY | Facility: CLINIC | Age: 48
End: 2023-08-08
Payer: MEDICARE

## 2023-08-08 DIAGNOSIS — G89.3 NEOPLASM RELATED PAIN: ICD-10-CM

## 2023-08-08 RX ORDER — HYDROCODONE BITARTRATE AND ACETAMINOPHEN 10; 325 MG/1; MG/1
1 TABLET ORAL EVERY 6 HOURS PRN
Qty: 90 TABLET | Refills: 0 | Status: SHIPPED | OUTPATIENT
Start: 2023-08-08 | End: 2023-08-09 | Stop reason: SDUPTHER

## 2023-08-08 NOTE — TELEPHONE ENCOUNTER
----- Message from Corinne E Coniglio, RN sent at 8/8/2023 12:19 PM CDT -----  Regarding: RE: SCHEDULING for D1 TRODELVY AND D2 ZIEXTENZO  He has not returned her calls. She made several attempts  Corinne  ----- Message -----  From: Carmen Jimenez RN  Sent: 8/8/2023  10:44 AM CDT  To: Corinne E Coniglio, RN  Subject: RE: SCHEDULING for D1 TRODELVY AND D2 ZIEXTE#    Arianne  Do you have the [place] navigator contact information for us to confirm treatment schedule?  Carmen    ----- Message -----  From: Coniglio, Corinne E, RN  Sent: 7/31/2023   9:46 AM CDT  To: Richard Keys NP; Carmen Jimenez RN  Subject: RE: SCHEDULING for D1 TRODELVY AND D2 ZIEXTE#    Robert Cordero,   I reached out to the navigator  in [facility], she will get him scheduled there to establish care.   Corinne     ----- Message -----  From: Carmen Jimenez RN  Sent: 7/27/2023   3:30 PM CDT  To: Corinne E Coniglio, RN; Mallory Greene, RN; #  Subject: RE: SCHEDULING for D1 TRODELVY AND D2 ZIEXTE#    Yefria  I understand completely and will update Dr Linn with this information  Thank you for all your time and assistance in this complicated matter  Carmen    ----- Message -----  From: Amber Lobato MA  Sent: 7/27/2023   3:16 PM CDT  To: Corinne E Coniglio, RN, Mallory Gerene, RN, #  Subject: RE: SCHEDULING for D1 TRODELVY AND D2 ZIEXTE#    Hi Shelley, Corinne is out I spoke with here is her response per Corinne this is going to be a very completed issue. Every time he has treatment we will have to drop authorization at the current facility and it will have to be reauth at the next facility. This may cause a delay in treatment. When Corinne returns she can look into the issue, however this may not be feasible. Our physicians may not be able to sign orders at these facilities and in this case he may need to be established with an oncologist in all of these regions.    Hi Janet, Corinne asked me to ask you with   current treatment is this going to be an issue?    Carmen and Janet Corinne will follow up with you juve when she return on Monday.       ----- Message -----  From: Carmen Jimenez RN  Sent: 7/27/2023  11:25 AM CDT  To: Corinne E Coniglio, RN, Mallory Greene RN  Subject: FW: SCHEDULING for D1 TRODELVY AND D2 ZIEXTE#    Corinne and Mallory,    My pt, Mr Li, needs his treatments to alternate between [place], a location in [place], and [facility].   Perhaps with Corinne  here at [facility] who is familiar with our processes and Mallory in the [facility], you two can review his chart and messages to accommodate Mr. Li and his family.      Thank you kindly for your time and consideration,    KANDI Morales  [facility]  ----- Message -----  From: Araceli Arriaga RN  Sent: 7/27/2023  10:07 AM CDT  To: Carmen Jimenez RN  Subject: RE: SCHEDULING for D1 TRODELVY AND D2 ZIEXTE#    What dates are you looking for? [facility] is at capacity until 8/14    Araceli   ----- Message -----  From: Carmen Jimenez RN  Sent: 7/27/2023   9:40 AM CDT  To: [facility] Staff  Subject: SCHEDULING for D1 TRODELVY AND D2 ZIEXTENZO      Is there anyway we can get this pt treated at any location for D1 TRODELVY AND D2 ZIEXTENZO. Please advise. Jaswinder Cobb     ----- Message -----  From: Jordyn Mckinney  Sent: 7/26/2023  10:01 AM CDT  To: [facility] PHELPS Staff  Subject: FW: scheduling                                     ----- Message -----  From: Tanja Beauchamp RN  Sent: 7/26/2023   8:30 AM CDT  To: Jordyn Mckinney  Subject: RE: scheduling                                   We have no availability this week.      ----- Message -----  From: Jordyn Mckinney  Sent: 7/25/2023  10:32 AM CDT  To: Carmen Jimenez RN, [facility], #  Subject: scheduling                                       Good Morning all,      Is there anyway we can get this pt treated at any location for D1 TRODELVY AND D2 ZIEXTENZO.  Please advise. Thanks   Jordyn

## 2023-08-08 NOTE — TELEPHONE ENCOUNTER
----- Message from Corinne E Coniglio, RN sent at 8/8/2023  3:32 PM CDT -----  Regarding: RE: SCHEDULING for D1 TRODELVY AND D2 ZIEXTENZO  Mariela [phone]. She is the breast navigator in [place].  Corinne  ----- Message -----  From: Carmen Jimenez RN  Sent: 8/8/2023   3:31 PM CDT  To: Corinne E Coniglio, RN  Subject: RE: SCHEDULING for D1 TRODELVY AND D2 ZIEXTE#    Can you send me her name and number so that I can inform Mr Miller from whom he should accept the call?      ----- Message -----  From: Coniglio, Corinne E, RN  Sent: 8/8/2023  12:21 PM CDT  To: Carmen Jimenez RN  Subject: RE: SCHEDULING for D1 TRODELVY AND D2 ZIEXTE#    I can send you her number when I get upstairs from clinic  Corinne  ----- Message -----  From: Carmen Jimenez RN  Sent: 8/8/2023  10:44 AM CDT  To: Corinne E Coniglio, RN  Subject: RE: SCHEDULING for D1 TRODELVY AND D2 ZIEXTE#    Arianne  Do you have the [place] navigator contact information for us to confirm treatment schedule?  Carmen    ----- Message -----  From: Coniglio, Corinne E, RN  Sent: 7/31/2023   9:46 AM CDT  To: Richard Keys NP; Carmen Jimenez RN  Subject: RE: SCHEDULING for D1 TRODELVY AND D2 ZIEXTE#    Robert Cordero,   I reached out to the navigator  in [facility], she will get him scheduled there to establish care.   Corinne     ----- Message -----  From: Carmen Jimenez RN  Sent: 7/27/2023   3:30 PM CDT  To: Corinne E Coniglio, RN; Mallory Greene RN; #  Subject: RE: SCHEDULING for D1 TRODELVY AND D2 ZIEXTE#    Amber  I understand completely and will update Dr Linn with this information  Thank you for all your time and assistance in this complicated matter  Carmen    ----- Message -----  From: Amber Lobato MA  Sent: 7/27/2023   3:16 PM CDT  To: Corinne E Coniglio, RN, Mallory Greene RN, #  Subject: RE: SCHEDULING for D1 TRODELVY AND D2 ZIEXTE#    Hi Shelley, Corinne is out I spoke with here is her response per Corinne this  is going to be a very completed issue. Every time he has treatment we will have to drop authorization at the current facility and it will have to be reauth at the next facility. This may cause a delay in treatment. When Corinne returns she can look into the issue, however this may not be feasible. Our physicians may not be able to sign orders at these facilities and in this case he may need to be established with an oncologist in all of these regions.    Hi Janet, Corinne asked me to ask you with  current treatment is this going to be an issue?    Shelley and Janet Corinne will follow up with you juve when she return on Monday.       ----- Message -----  From: Carmen Jimenez RN  Sent: 7/27/2023  11:25 AM CDT  To: Corinne E Coniglio, RN, Mallory Greene RN  Subject: FW: SCHEDULING for D1 TRODELVY AND D2 ZIEXTE#    Corinne and Mallory,    My pt, Mr Li, needs his treatments to alternate between [place], a location in [place], and [facility].   Perhaps with Corinne  here at [facility] who is familiar with our processes and Mallory in the [facility], you two can review his chart and messages to accommodate Mr. Li and his family.      Thank you kindly for your time and consideration,    KANDI Morales  [facility]  ----- Message -----  From: Araceli Arriaga RN  Sent: 7/27/2023  10:07 AM CDT  To: Carmen Jimenez RN  Subject: RE: SCHEDULING for D1 TRODELVY AND D2 ZIEXTE#    What dates are you looking for? [facility] is at capacity until 8/14    Araceli   ----- Message -----  From: Carmen Jimenez RN  Sent: 7/27/2023   9:40 AM CDT  To: [facility] Staff  Subject: SCHEDULING for D1 TRODELVY AND D2 ZIEXTENZO      Is there anyway we can get this pt treated at any location for D1 TRODELVY AND D2 ZIEXTENZO. Please advise. Jaswinder Cobb     ----- Message -----  From: Jordyn Mckinney  Sent: 7/26/2023  10:01 AM CDT  To: [facility] PHELPS Staff  Subject: FW: scheduling                                      ----- Message -----  From: Tanja Beauchamp RN  Sent: 7/26/2023   8:30 AM CDT  To: Jordyn Mckinney  Subject: RE: scheduling                                   We have no availability this week.      ----- Message -----  From: Jordyn Mckinney  Sent: 7/25/2023  10:32 AM CDT  To: Carmen Jimenez RN, [facility], #  Subject: scheduling                                       Good Morning all,      Is there anyway we can get this pt treated at any location for D1 TRODELVY AND D2 ZIEXTENZO. Please advise. Thanks   Jordyn

## 2023-08-08 NOTE — TELEPHONE ENCOUNTER
----- Message from Tremontana Chevalier sent at 8/8/2023  4:42 PM CDT -----  Regarding: pt advice from pharm  Caller Lyndsey from [facility] in [place], [place] calling to inform Dr. Linn's office that the script below is not fillable bc it's across state lines and a controlled substance:    RX  HYDROcodone-acetaminophen (NORCO)  mg per tablet 90 tablet   Sig - Route: Take 1 tablet by mouth every 6 (six) hours as     See [facility] meds where script ws sent to:    [facility]/pharmacy #[id number] - [place] [address] [place] [address] [facility] [address]  Phone: [phone] Fax: [phone]

## 2023-08-09 ENCOUNTER — PATIENT MESSAGE (OUTPATIENT)
Dept: HEMATOLOGY/ONCOLOGY | Facility: CLINIC | Age: 48
End: 2023-08-09
Payer: MEDICARE

## 2023-08-09 ENCOUNTER — PATIENT MESSAGE (OUTPATIENT)
Dept: PALLIATIVE MEDICINE | Facility: CLINIC | Age: 48
End: 2023-08-09
Payer: MEDICARE

## 2023-08-09 DIAGNOSIS — C80.1 ANXIETY ASSOCIATED WITH CANCER DIAGNOSIS: ICD-10-CM

## 2023-08-09 DIAGNOSIS — G89.3 NEOPLASM RELATED PAIN: ICD-10-CM

## 2023-08-09 DIAGNOSIS — F41.1 ANXIETY ASSOCIATED WITH CANCER DIAGNOSIS: ICD-10-CM

## 2023-08-09 RX ORDER — HYDROCODONE BITARTRATE AND ACETAMINOPHEN 10; 325 MG/1; MG/1
1 TABLET ORAL EVERY 6 HOURS PRN
Qty: 90 TABLET | Refills: 0 | Status: SHIPPED | OUTPATIENT
Start: 2023-08-09 | End: 2023-08-09 | Stop reason: SDUPTHER

## 2023-08-09 RX ORDER — ALPRAZOLAM 0.5 MG/1
0.5 TABLET ORAL 3 TIMES DAILY PRN
Qty: 60 TABLET | Refills: 0 | Status: SHIPPED | OUTPATIENT
Start: 2023-08-09 | End: 2023-12-06 | Stop reason: SDUPTHER

## 2023-08-09 RX ORDER — ALPRAZOLAM 0.5 MG/1
0.5 TABLET ORAL 3 TIMES DAILY PRN
Qty: 60 TABLET | Refills: 0 | Status: SHIPPED | OUTPATIENT
Start: 2023-08-09 | End: 2023-08-09 | Stop reason: SDUPTHER

## 2023-08-09 RX ORDER — HYDROCODONE BITARTRATE AND ACETAMINOPHEN 10; 325 MG/1; MG/1
1 TABLET ORAL EVERY 6 HOURS PRN
Qty: 90 TABLET | Refills: 0 | Status: SHIPPED | OUTPATIENT
Start: 2023-08-09 | End: 2023-08-30 | Stop reason: SDUPTHER

## 2023-08-22 ENCOUNTER — TELEPHONE (OUTPATIENT)
Dept: PSYCHIATRY | Facility: CLINIC | Age: 48
End: 2023-08-22
Payer: MEDICARE

## 2023-08-22 NOTE — TELEPHONE ENCOUNTER
Spoke to patient by phone due to lack of patient log-in at scheduled time for video visit. Detailed rescheduling and contact information provided. Patient forgot appt, but states he is doing really well. He will call to reschedule, as needed.    GENNARO Sandoval, PhD

## 2023-09-11 PROBLEM — Z00.00 ENCOUNTER FOR PREVENTIVE HEALTH EXAMINATION: Chronic | Status: RESOLVED | Noted: 2023-06-12 | Resolved: 2023-09-11

## 2023-09-21 DIAGNOSIS — G62.0 CHEMOTHERAPY-INDUCED NEUROPATHY: ICD-10-CM

## 2023-09-21 DIAGNOSIS — T45.1X5A CHEMOTHERAPY-INDUCED NEUROPATHY: ICD-10-CM

## 2023-09-21 RX ORDER — GABAPENTIN 300 MG/1
900 CAPSULE ORAL 3 TIMES DAILY
Qty: 270 CAPSULE | Refills: 2 | Status: SHIPPED | OUTPATIENT
Start: 2023-09-21 | End: 2024-01-24 | Stop reason: SDUPTHER

## 2023-09-29 PROBLEM — Z51.11 ENCOUNTER FOR ANTINEOPLASTIC CHEMOTHERAPY: Status: ACTIVE | Noted: 2023-09-29

## 2023-10-01 DIAGNOSIS — F41.1 ANXIETY ASSOCIATED WITH CANCER DIAGNOSIS: ICD-10-CM

## 2023-10-01 DIAGNOSIS — C79.51 MALIGNANT NEOPLASM METASTATIC TO BONE: ICD-10-CM

## 2023-10-01 DIAGNOSIS — C80.1 ANXIETY ASSOCIATED WITH CANCER DIAGNOSIS: ICD-10-CM

## 2023-10-02 RX ORDER — ALPRAZOLAM 0.5 MG/1
0.5 TABLET ORAL 3 TIMES DAILY PRN
Qty: 60 TABLET | Refills: 0 | OUTPATIENT
Start: 2023-10-02 | End: 2024-10-01

## 2023-10-02 RX ORDER — IBUPROFEN 600 MG/1
600 TABLET ORAL EVERY 8 HOURS PRN
Qty: 20 TABLET | Refills: 0 | OUTPATIENT
Start: 2023-10-02

## 2023-10-12 ENCOUNTER — PATIENT MESSAGE (OUTPATIENT)
Dept: HEMATOLOGY/ONCOLOGY | Facility: CLINIC | Age: 48
End: 2023-10-12
Payer: MEDICARE

## 2023-11-02 DIAGNOSIS — I10 ESSENTIAL HYPERTENSION: ICD-10-CM

## 2023-11-02 DIAGNOSIS — C50.021 MALIGNANT NEOPLASM INVOLVING BOTH NIPPLE AND AREOLA OF RIGHT BREAST IN MALE, ESTROGEN RECEPTOR NEGATIVE: ICD-10-CM

## 2023-11-02 DIAGNOSIS — E11.65 UNCONTROLLED TYPE 2 DIABETES MELLITUS WITH HYPERGLYCEMIA: ICD-10-CM

## 2023-11-02 DIAGNOSIS — Z17.1 MALIGNANT NEOPLASM INVOLVING BOTH NIPPLE AND AREOLA OF RIGHT BREAST IN MALE, ESTROGEN RECEPTOR NEGATIVE: ICD-10-CM

## 2023-11-02 DIAGNOSIS — G47.00 INSOMNIA, UNSPECIFIED TYPE: ICD-10-CM

## 2023-11-02 RX ORDER — METFORMIN HYDROCHLORIDE 500 MG/1
1000 TABLET ORAL 2 TIMES DAILY WITH MEALS
Qty: 120 TABLET | Refills: 2 | OUTPATIENT
Start: 2023-11-02 | End: 2023-12-02

## 2023-11-02 RX ORDER — HYDROCHLOROTHIAZIDE 25 MG/1
TABLET ORAL
Qty: 90 TABLET | Refills: 3 | Status: CANCELLED | OUTPATIENT
Start: 2023-11-02

## 2023-11-02 RX ORDER — LOSARTAN POTASSIUM 50 MG/1
TABLET ORAL
Qty: 180 TABLET | Refills: 3 | Status: CANCELLED | OUTPATIENT
Start: 2023-11-02

## 2023-11-02 RX ORDER — TRAZODONE HYDROCHLORIDE 100 MG/1
100 TABLET ORAL NIGHTLY
Qty: 30 TABLET | Refills: 2 | Status: SHIPPED | OUTPATIENT
Start: 2023-11-02 | End: 2024-02-29 | Stop reason: SDUPTHER

## 2023-11-09 ENCOUNTER — PATIENT MESSAGE (OUTPATIENT)
Dept: HEMATOLOGY/ONCOLOGY | Facility: CLINIC | Age: 48
End: 2023-11-09
Payer: MEDICARE

## 2023-11-09 ENCOUNTER — TELEPHONE (OUTPATIENT)
Dept: HEMATOLOGY/ONCOLOGY | Facility: CLINIC | Age: 48
End: 2023-11-09
Payer: MEDICARE

## 2023-11-09 DIAGNOSIS — I10 HYPERTENSION, UNSPECIFIED TYPE: ICD-10-CM

## 2023-11-09 DIAGNOSIS — F32.0 CURRENT MILD EPISODE OF MAJOR DEPRESSIVE DISORDER WITHOUT PRIOR EPISODE: Primary | ICD-10-CM

## 2023-11-09 DIAGNOSIS — I10 ESSENTIAL HYPERTENSION: ICD-10-CM

## 2023-11-09 RX ORDER — FLUOXETINE HYDROCHLORIDE 40 MG/1
80 CAPSULE ORAL DAILY
Qty: 60 CAPSULE | Refills: 1 | Status: SHIPPED | OUTPATIENT
Start: 2023-11-09 | End: 2024-01-24 | Stop reason: SDUPTHER

## 2023-11-09 RX ORDER — LOSARTAN POTASSIUM 50 MG/1
TABLET ORAL
Qty: 180 TABLET | Refills: 3 | Status: SHIPPED | OUTPATIENT
Start: 2023-11-09

## 2023-11-09 RX ORDER — AMLODIPINE BESYLATE 10 MG/1
TABLET ORAL
Qty: 90 TABLET | Refills: 3 | Status: SHIPPED | OUTPATIENT
Start: 2023-11-09

## 2023-11-09 NOTE — TELEPHONE ENCOUNTER
Called patient. Patient needs refill on losartan, amlodipine and fluoxetine. Encouraged to have current fills by established provider. PJ

## 2023-11-20 DIAGNOSIS — I10 ESSENTIAL HYPERTENSION: ICD-10-CM

## 2023-11-20 RX ORDER — HYDROCHLOROTHIAZIDE 25 MG/1
TABLET ORAL
Qty: 90 TABLET | Refills: 3 | Status: SHIPPED | OUTPATIENT
Start: 2023-11-20

## 2023-11-29 DIAGNOSIS — F41.1 ANXIETY ASSOCIATED WITH CANCER DIAGNOSIS: ICD-10-CM

## 2023-11-29 DIAGNOSIS — C80.1 ANXIETY ASSOCIATED WITH CANCER DIAGNOSIS: ICD-10-CM

## 2023-11-29 PROBLEM — G89.3 NEOPLASM RELATED PAIN: Status: ACTIVE | Noted: 2023-11-29

## 2023-11-30 RX ORDER — ALPRAZOLAM 0.5 MG/1
0.5 TABLET ORAL 3 TIMES DAILY PRN
Qty: 60 TABLET | Refills: 0 | OUTPATIENT
Start: 2023-11-30 | End: 2024-11-29

## 2023-12-04 ENCOUNTER — PATIENT MESSAGE (OUTPATIENT)
Dept: HEMATOLOGY/ONCOLOGY | Facility: CLINIC | Age: 48
End: 2023-12-04
Payer: MEDICARE

## 2023-12-13 ENCOUNTER — PATIENT OUTREACH (OUTPATIENT)
Dept: ADMINISTRATIVE | Facility: HOSPITAL | Age: 48
End: 2023-12-13
Payer: MEDICARE

## 2023-12-13 PROBLEM — M54.42 CHRONIC MIDLINE LOW BACK PAIN WITH LEFT-SIDED SCIATICA: Status: ACTIVE | Noted: 2023-12-13

## 2023-12-13 PROBLEM — G89.29 CHRONIC MIDLINE LOW BACK PAIN WITH LEFT-SIDED SCIATICA: Status: ACTIVE | Noted: 2023-12-13

## 2023-12-13 PROBLEM — C80.1 ANXIETY ASSOCIATED WITH CANCER DIAGNOSIS: Status: ACTIVE | Noted: 2023-12-13

## 2023-12-13 PROBLEM — F41.1 ANXIETY ASSOCIATED WITH CANCER DIAGNOSIS: Status: ACTIVE | Noted: 2023-12-13

## 2023-12-13 PROBLEM — D63.0 ANEMIA IN NEOPLASTIC DISEASE: Status: ACTIVE | Noted: 2023-12-13

## 2023-12-13 NOTE — PROGRESS NOTES
PHN Gap Report STATIN    Population Health Chart Review & Patient Outreach Details    Outreach Performed: NO    Additional Pop Health Notes:               Health Maintenance Topics Overdue:    Health Maintenance Due   Topic Date Due    Pneumococcal Vaccines (Age 0-64) (1 - PCV) Never done    Low Dose Statin  Never done    Foot Exam  11/06/2020    Diabetes Urine Screening  01/02/2021    Colorectal Cancer Screening  Never done    Lipid Panel  05/26/2023    Influenza Vaccine (1) Never done         Health Maintenance Topic(s) Outreach Outcomes & Actions Taken:    Medication Adherence / Statins - Outreach Outcomes & Actions Taken  : Sent Provider Message to Review to Evaluate Pt for Statin, Add Exclusion Dx Codes, Document Exclusion in Problem List, Change Statin Intensity Level to Moderate or High Intensity if Applicable

## 2023-12-18 ENCOUNTER — PATIENT OUTREACH (OUTPATIENT)
Dept: ADMINISTRATIVE | Facility: HOSPITAL | Age: 48
End: 2023-12-18
Payer: MEDICARE

## 2023-12-18 DIAGNOSIS — E11.65 UNCONTROLLED TYPE 2 DIABETES MELLITUS WITH HYPERGLYCEMIA: ICD-10-CM

## 2023-12-18 RX ORDER — METFORMIN HYDROCHLORIDE 500 MG/1
1000 TABLET ORAL 2 TIMES DAILY WITH MEALS
Qty: 120 TABLET | Refills: 2 | Status: SHIPPED | OUTPATIENT
Start: 2023-12-18 | End: 2024-04-12

## 2023-12-18 NOTE — PROGRESS NOTES
Population Health Chart Review & Patient Outreach Details    Outreach Performed: NO    Additional Pop Health Notes:           Updates Requested / Reviewed:      Updated Care Coordination Note, Care Everywhere, and Immunizations Reconciliation Completed or Queried: [place]         Health Maintenance Topics Overdue:    Health Maintenance Due   Topic Date Due    Pneumococcal Vaccines (Age 0-64) (1 - PCV) Never done    Low Dose Statin  Never done    Foot Exam  11/06/2020    Diabetes Urine Screening  01/02/2021    Colorectal Cancer Screening  Never done    Lipid Panel  05/26/2023    Influenza Vaccine (1) Never done         Health Maintenance Topic(s) Outreach Outcomes & Actions Taken:    Medication Adherence / Statins - Outreach Outcomes & Actions Taken  : The provider was sent a message about statin therapy.

## 2023-12-21 DIAGNOSIS — C79.51 BONE METASTASES: ICD-10-CM

## 2023-12-22 RX ORDER — FERROUS SULFATE, DRIED 160(50) MG
1 TABLET, EXTENDED RELEASE ORAL 2 TIMES DAILY
Qty: 180 TABLET | Refills: 3 | Status: SHIPPED | OUTPATIENT
Start: 2023-12-22 | End: 2024-12-21

## 2023-12-26 ENCOUNTER — TELEPHONE (OUTPATIENT)
Dept: FAMILY MEDICINE | Facility: CLINIC | Age: 48
End: 2023-12-26
Payer: MEDICARE

## 2023-12-26 DIAGNOSIS — M79.10 MYALGIA: Primary | ICD-10-CM

## 2023-12-26 NOTE — TELEPHONE ENCOUNTER
He is a transplant patient and cannot take statins. I added myalgai to his problem list.  ----- Message from Sheela Craven LPN sent at 12/13/2023 12:24 PM CST -----  Regarding: PHN Statin report  The patient is showing as non-compliant on the Statin Therapy Measure. Patient does not have current statin listed in medication list.      Inclusion on Non-Compliant List due to - ASCVD and/or Diabetes    Statin Use in Persons with Diabetes   Percentage of patients with Part D benefits who are 40?75 years old who received at least two diabetic medication fills during the measurement year and were dispensed a statin medication fill during the measurement year.  Statin Therapy for Patients with ASCVD  Percentage of males 21-75 years old during the measurement year who were identified as having clinical atherosclerotic cardiovascular disease (ASCVD).  At least one dispensing event for a high- or moderate-intensity statin medication in the measurement year.    Moderate-Intensity Statin Therapy  Atorvastatin 10-20 mg  Pitavastatin 1-4 mg  Simvastatin 20-40 mg  Rosuvastatin 5-10 mg  Pravastatin 40-80 mg  Lovastatin 40 mg  Fluvastatin 40 mg  Fluvastatin XL 80 mg    High-intensity Statin Therapy  Atorvastatin 40-80 mg  Rosuvastatin 20-40 mg  Simvastatin 80 mg    Exclusions: Intolerance needs a 2023 code (myalgia, myositis, myopathy, or rhabdomyolysis) during the current measurement year AND problem list updated and reviewed.   G72.0  Drug-induced myopathy or rhabdomyolysis   G72.9  Myopathy, unspecified  M60.9  Myositis, unspecified  M79.10 Myalgia, unspecified site    Please review and advise.

## 2023-12-28 ENCOUNTER — PATIENT MESSAGE (OUTPATIENT)
Dept: HEMATOLOGY/ONCOLOGY | Facility: CLINIC | Age: 48
End: 2023-12-28
Payer: MEDICARE

## 2024-01-24 DIAGNOSIS — T45.1X5A CHEMOTHERAPY-INDUCED NEUROPATHY: ICD-10-CM

## 2024-01-24 DIAGNOSIS — G62.0 CHEMOTHERAPY-INDUCED NEUROPATHY: ICD-10-CM

## 2024-01-24 DIAGNOSIS — F32.0 CURRENT MILD EPISODE OF MAJOR DEPRESSIVE DISORDER WITHOUT PRIOR EPISODE: ICD-10-CM

## 2024-01-24 RX ORDER — FLUOXETINE HYDROCHLORIDE 40 MG/1
80 CAPSULE ORAL DAILY
Qty: 60 CAPSULE | Refills: 0 | Status: SHIPPED | OUTPATIENT
Start: 2024-01-24 | End: 2024-02-29 | Stop reason: SDUPTHER

## 2024-01-24 RX ORDER — GABAPENTIN 300 MG/1
900 CAPSULE ORAL 3 TIMES DAILY
Qty: 270 CAPSULE | Refills: 2 | Status: SHIPPED | OUTPATIENT
Start: 2024-01-24 | End: 2024-05-07 | Stop reason: SDUPTHER

## 2024-02-06 ENCOUNTER — PATIENT MESSAGE (OUTPATIENT)
Dept: OTHER | Facility: OTHER | Age: 49
End: 2024-02-06
Payer: MEDICARE

## 2024-02-28 ENCOUNTER — PATIENT MESSAGE (OUTPATIENT)
Dept: HEMATOLOGY/ONCOLOGY | Facility: CLINIC | Age: 49
End: 2024-02-28
Payer: MEDICARE

## 2024-02-28 PROBLEM — H54.62 VISION LOSS OF LEFT EYE: Status: ACTIVE | Noted: 2024-02-28

## 2024-02-29 DIAGNOSIS — F32.0 CURRENT MILD EPISODE OF MAJOR DEPRESSIVE DISORDER WITHOUT PRIOR EPISODE: ICD-10-CM

## 2024-02-29 DIAGNOSIS — G47.00 INSOMNIA, UNSPECIFIED TYPE: ICD-10-CM

## 2024-02-29 DIAGNOSIS — C50.021 MALIGNANT NEOPLASM INVOLVING BOTH NIPPLE AND AREOLA OF RIGHT BREAST IN MALE, ESTROGEN RECEPTOR NEGATIVE: ICD-10-CM

## 2024-02-29 DIAGNOSIS — Z17.1 MALIGNANT NEOPLASM INVOLVING BOTH NIPPLE AND AREOLA OF RIGHT BREAST IN MALE, ESTROGEN RECEPTOR NEGATIVE: ICD-10-CM

## 2024-02-29 RX ORDER — TRAZODONE HYDROCHLORIDE 100 MG/1
100 TABLET ORAL NIGHTLY
Qty: 30 TABLET | Refills: 2 | Status: SHIPPED | OUTPATIENT
Start: 2024-02-29 | End: 2024-05-01

## 2024-02-29 RX ORDER — FLUOXETINE HYDROCHLORIDE 40 MG/1
80 CAPSULE ORAL DAILY
Qty: 60 CAPSULE | Refills: 0 | Status: SHIPPED | OUTPATIENT
Start: 2024-02-29 | End: 2024-04-09 | Stop reason: SDUPTHER

## 2024-02-29 NOTE — TELEPHONE ENCOUNTER
Please see the attached refill request. Patient no longer follows in my clinic since he moved out of [place], [place].

## 2024-03-01 ENCOUNTER — HOSPITAL ENCOUNTER (OUTPATIENT)
Dept: RADIATION THERAPY | Facility: HOSPITAL | Age: 49
Discharge: HOME | End: 2024-03-01
Attending: RADIOLOGY
Payer: MEDICARE

## 2024-03-11 ENCOUNTER — PATIENT MESSAGE (OUTPATIENT)
Dept: HEMATOLOGY/ONCOLOGY | Facility: CLINIC | Age: 49
End: 2024-03-11
Payer: MEDICARE

## 2024-03-15 NOTE — TELEPHONE ENCOUNTER
Spoke to patient's wife. She says patient's prescription was not part of the recall.   
yes
Ambulatory

## 2024-03-20 ENCOUNTER — HOSPITAL ENCOUNTER (INPATIENT)
Facility: HOSPITAL | Age: 49
LOS: 1 days | Discharge: HOME HEALTH | DRG: 055 | End: 2024-03-22
Attending: EMERGENCY MEDICINE | Admitting: STUDENT IN AN ORGANIZED HEALTH CARE EDUCATION/TRAINING PROGRAM
Payer: MEDICARE

## 2024-03-20 ENCOUNTER — PATIENT MESSAGE (OUTPATIENT)
Dept: HEMATOLOGY/ONCOLOGY | Facility: CLINIC | Age: 49
End: 2024-03-20
Payer: MEDICARE

## 2024-03-20 DIAGNOSIS — R29.818 ACUTE FOCAL NEUROLOGICAL DEFICIT: ICD-10-CM

## 2024-03-20 DIAGNOSIS — R07.9 CHEST PAIN: ICD-10-CM

## 2024-03-20 DIAGNOSIS — H54.62 VISION LOSS OF LEFT EYE: ICD-10-CM

## 2024-03-20 DIAGNOSIS — E11.9 TYPE 2 DIABETES MELLITUS WITHOUT COMPLICATION, UNSPECIFIED WHETHER LONG TERM INSULIN USE: ICD-10-CM

## 2024-03-20 DIAGNOSIS — H54.7 VISION LOSS: Primary | ICD-10-CM

## 2024-03-20 LAB
ALBUMIN SERPL BCP-MCNC: 3.3 G/DL (ref 3.5–5.2)
ALP SERPL-CCNC: 133 U/L (ref 55–135)
ALT SERPL W/O P-5'-P-CCNC: 14 U/L (ref 10–44)
ANION GAP SERPL CALC-SCNC: 10 MMOL/L (ref 8–16)
AST SERPL-CCNC: 30 U/L (ref 10–40)
BASOPHILS # BLD AUTO: 0.01 K/UL (ref 0–0.2)
BASOPHILS NFR BLD: 0.2 % (ref 0–1.9)
BILIRUB SERPL-MCNC: 0.5 MG/DL (ref 0.1–1)
BUN SERPL-MCNC: 16 MG/DL (ref 6–20)
CALCIUM SERPL-MCNC: 9.7 MG/DL (ref 8.7–10.5)
CHLORIDE SERPL-SCNC: 102 MMOL/L (ref 95–110)
CHOLEST SERPL-MCNC: 164 MG/DL (ref 120–199)
CHOLEST/HDLC SERPL: 4.1 {RATIO} (ref 2–5)
CO2 SERPL-SCNC: 23 MMOL/L (ref 23–29)
CREAT SERPL-MCNC: 1.1 MG/DL (ref 0.5–1.4)
CREAT SERPL-MCNC: 1.1 MG/DL (ref 0.5–1.4)
DIFFERENTIAL METHOD BLD: ABNORMAL
EOSINOPHIL # BLD AUTO: 0 K/UL (ref 0–0.5)
EOSINOPHIL NFR BLD: 0.8 % (ref 0–8)
ERYTHROCYTE [DISTWIDTH] IN BLOOD BY AUTOMATED COUNT: 19.6 % (ref 11.5–14.5)
EST. GFR  (NO RACE VARIABLE): >60 ML/MIN/1.73 M^2
GLUCOSE SERPL-MCNC: 84 MG/DL (ref 70–110)
HCT VFR BLD AUTO: 28.8 % (ref 40–54)
HDLC SERPL-MCNC: 40 MG/DL (ref 40–75)
HDLC SERPL: 24.4 % (ref 20–50)
HGB BLD-MCNC: 8.5 G/DL (ref 14–18)
IMM GRANULOCYTES # BLD AUTO: 0.01 K/UL (ref 0–0.04)
IMM GRANULOCYTES NFR BLD AUTO: 0.2 % (ref 0–0.5)
INR PPP: 1.1 (ref 0.8–1.2)
LDLC SERPL CALC-MCNC: 109.8 MG/DL (ref 63–159)
LYMPHOCYTES # BLD AUTO: 1.1 K/UL (ref 1–4.8)
LYMPHOCYTES NFR BLD: 22.7 % (ref 18–48)
MCH RBC QN AUTO: 22.5 PG (ref 27–31)
MCHC RBC AUTO-ENTMCNC: 29.5 G/DL (ref 32–36)
MCV RBC AUTO: 76 FL (ref 82–98)
MONOCYTES # BLD AUTO: 0.6 K/UL (ref 0.3–1)
MONOCYTES NFR BLD: 12.5 % (ref 4–15)
NEUTROPHILS # BLD AUTO: 3.1 K/UL (ref 1.8–7.7)
NEUTROPHILS NFR BLD: 63.6 % (ref 38–73)
NONHDLC SERPL-MCNC: 124 MG/DL
NRBC BLD-RTO: 0 /100 WBC
PLATELET # BLD AUTO: 381 K/UL (ref 150–450)
PMV BLD AUTO: 11.1 FL (ref 9.2–12.9)
POC PTINR: 1.2 (ref 0.9–1.2)
POC PTWBT: 14.7 SEC (ref 9.7–14.3)
POCT GLUCOSE: 85 MG/DL (ref 70–110)
POTASSIUM SERPL-SCNC: 4.1 MMOL/L (ref 3.5–5.1)
PROT SERPL-MCNC: 8.4 G/DL (ref 6–8.4)
PROTHROMBIN TIME: 11.4 SEC (ref 9–12.5)
RBC # BLD AUTO: 3.77 M/UL (ref 4.6–6.2)
SAMPLE: ABNORMAL
SAMPLE: NORMAL
SODIUM SERPL-SCNC: 135 MMOL/L (ref 136–145)
TRIGL SERPL-MCNC: 71 MG/DL (ref 30–150)
TSH SERPL DL<=0.005 MIU/L-ACNC: 1.36 UIU/ML (ref 0.4–4)
WBC # BLD AUTO: 4.88 K/UL (ref 3.9–12.7)

## 2024-03-20 PROCEDURE — 80061 LIPID PANEL: CPT | Performed by: PHYSICIAN ASSISTANT

## 2024-03-20 PROCEDURE — 85025 COMPLETE CBC W/AUTO DIFF WBC: CPT | Performed by: PHYSICIAN ASSISTANT

## 2024-03-20 PROCEDURE — 99285 EMERGENCY DEPT VISIT HI MDM: CPT | Mod: 25

## 2024-03-20 PROCEDURE — 25000003 PHARM REV CODE 250: Performed by: PHYSICIAN ASSISTANT

## 2024-03-20 PROCEDURE — 99900035 HC TECH TIME PER 15 MIN (STAT)

## 2024-03-20 PROCEDURE — 82962 GLUCOSE BLOOD TEST: CPT

## 2024-03-20 PROCEDURE — 25500020 PHARM REV CODE 255: Performed by: EMERGENCY MEDICINE

## 2024-03-20 PROCEDURE — 85610 PROTHROMBIN TIME: CPT

## 2024-03-20 PROCEDURE — 85610 PROTHROMBIN TIME: CPT | Performed by: PHYSICIAN ASSISTANT

## 2024-03-20 PROCEDURE — 82565 ASSAY OF CREATININE: CPT

## 2024-03-20 PROCEDURE — 99223 1ST HOSP IP/OBS HIGH 75: CPT | Mod: ,,, | Performed by: PSYCHIATRY & NEUROLOGY

## 2024-03-20 PROCEDURE — 93005 ELECTROCARDIOGRAM TRACING: CPT

## 2024-03-20 PROCEDURE — 96374 THER/PROPH/DIAG INJ IV PUSH: CPT

## 2024-03-20 PROCEDURE — 25000003 PHARM REV CODE 250: Performed by: EMERGENCY MEDICINE

## 2024-03-20 PROCEDURE — 93010 ELECTROCARDIOGRAM REPORT: CPT | Mod: ,,, | Performed by: INTERNAL MEDICINE

## 2024-03-20 PROCEDURE — 63600175 PHARM REV CODE 636 W HCPCS: Performed by: PHYSICIAN ASSISTANT

## 2024-03-20 PROCEDURE — 80053 COMPREHEN METABOLIC PANEL: CPT | Performed by: PHYSICIAN ASSISTANT

## 2024-03-20 PROCEDURE — 84443 ASSAY THYROID STIM HORMONE: CPT | Performed by: PHYSICIAN ASSISTANT

## 2024-03-20 RX ORDER — CYCLOBENZAPRINE HCL 10 MG
10 TABLET ORAL
Status: COMPLETED | OUTPATIENT
Start: 2024-03-20 | End: 2024-03-20

## 2024-03-20 RX ORDER — GADOBUTROL 604.72 MG/ML
10 INJECTION INTRAVENOUS
Status: COMPLETED | OUTPATIENT
Start: 2024-03-21 | End: 2024-03-20

## 2024-03-20 RX ORDER — PROPARACAINE HYDROCHLORIDE 5 MG/ML
2 SOLUTION/ DROPS OPHTHALMIC
Status: COMPLETED | OUTPATIENT
Start: 2024-03-20 | End: 2024-03-20

## 2024-03-20 RX ORDER — MORPHINE SULFATE 4 MG/ML
4 INJECTION, SOLUTION INTRAMUSCULAR; INTRAVENOUS
Status: COMPLETED | OUTPATIENT
Start: 2024-03-20 | End: 2024-03-20

## 2024-03-20 RX ADMIN — PROPARACAINE HYDROCHLORIDE 2 DROP: 5 SOLUTION/ DROPS OPHTHALMIC at 07:03

## 2024-03-20 RX ADMIN — GADOBUTROL 10 ML: 604.72 INJECTION INTRAVENOUS at 11:03

## 2024-03-20 RX ADMIN — IOHEXOL 100 ML: 350 INJECTION, SOLUTION INTRAVENOUS at 07:03

## 2024-03-20 RX ADMIN — FLUORESCEIN SODIUM 1 EACH: 1 STRIP OPHTHALMIC at 07:03

## 2024-03-20 RX ADMIN — MORPHINE SULFATE 4 MG: 4 INJECTION INTRAVENOUS at 09:03

## 2024-03-20 RX ADMIN — CYCLOBENZAPRINE 10 MG: 10 TABLET, FILM COATED ORAL at 11:03

## 2024-03-21 ENCOUNTER — TELEPHONE (OUTPATIENT)
Dept: NEUROSURGERY | Facility: CLINIC | Age: 49
End: 2024-03-21
Payer: MEDICARE

## 2024-03-21 DIAGNOSIS — H54.7 UNSPECIFIED VISUAL LOSS: Primary | ICD-10-CM

## 2024-03-21 DIAGNOSIS — H54.7 UNSPECIFIED VISUAL LOSS: ICD-10-CM

## 2024-03-21 DIAGNOSIS — G93.9 BRAIN LESION: Primary | ICD-10-CM

## 2024-03-21 PROBLEM — C79.31 SECONDARY MALIGNANT NEOPLASM OF BRAIN: Status: ACTIVE | Noted: 2024-03-21

## 2024-03-21 PROBLEM — G89.3 CANCER RELATED PAIN: Status: ACTIVE | Noted: 2024-03-21

## 2024-03-21 LAB
ALBUMIN SERPL BCP-MCNC: 2.9 G/DL (ref 3.5–5.2)
ALP SERPL-CCNC: 127 U/L (ref 55–135)
ALT SERPL W/O P-5'-P-CCNC: 11 U/L (ref 10–44)
ANION GAP SERPL CALC-SCNC: 9 MMOL/L (ref 8–16)
AST SERPL-CCNC: 25 U/L (ref 10–40)
BASOPHILS # BLD AUTO: 0.01 K/UL (ref 0–0.2)
BASOPHILS NFR BLD: 0.3 % (ref 0–1.9)
BILIRUB SERPL-MCNC: 0.5 MG/DL (ref 0.1–1)
BUN SERPL-MCNC: 12 MG/DL (ref 6–20)
CALCIUM SERPL-MCNC: 8.9 MG/DL (ref 8.7–10.5)
CHLORIDE SERPL-SCNC: 101 MMOL/L (ref 95–110)
CO2 SERPL-SCNC: 21 MMOL/L (ref 23–29)
CREAT SERPL-MCNC: 0.9 MG/DL (ref 0.5–1.4)
DIFFERENTIAL METHOD BLD: ABNORMAL
EOSINOPHIL # BLD AUTO: 0 K/UL (ref 0–0.5)
EOSINOPHIL NFR BLD: 0.5 % (ref 0–8)
ERYTHROCYTE [DISTWIDTH] IN BLOOD BY AUTOMATED COUNT: 19.4 % (ref 11.5–14.5)
EST. GFR  (NO RACE VARIABLE): >60 ML/MIN/1.73 M^2
GLUCOSE SERPL-MCNC: 84 MG/DL (ref 70–110)
HCT VFR BLD AUTO: 24.4 % (ref 40–54)
HGB BLD-MCNC: 7.6 G/DL (ref 14–18)
IMM GRANULOCYTES # BLD AUTO: 0.01 K/UL (ref 0–0.04)
IMM GRANULOCYTES NFR BLD AUTO: 0.3 % (ref 0–0.5)
LYMPHOCYTES # BLD AUTO: 1 K/UL (ref 1–4.8)
LYMPHOCYTES NFR BLD: 24.9 % (ref 18–48)
MAGNESIUM SERPL-MCNC: 1.5 MG/DL (ref 1.6–2.6)
MCH RBC QN AUTO: 23.5 PG (ref 27–31)
MCHC RBC AUTO-ENTMCNC: 31.1 G/DL (ref 32–36)
MCV RBC AUTO: 76 FL (ref 82–98)
MONOCYTES # BLD AUTO: 0.6 K/UL (ref 0.3–1)
MONOCYTES NFR BLD: 14.8 % (ref 4–15)
NEUTROPHILS # BLD AUTO: 2.3 K/UL (ref 1.8–7.7)
NEUTROPHILS NFR BLD: 59.2 % (ref 38–73)
NRBC BLD-RTO: 0 /100 WBC
OHS QRS DURATION: 86 MS
OHS QTC CALCULATION: 416 MS
PHOSPHATE SERPL-MCNC: 4.1 MG/DL (ref 2.7–4.5)
PLATELET # BLD AUTO: 336 K/UL (ref 150–450)
PMV BLD AUTO: 9.6 FL (ref 9.2–12.9)
POCT GLUCOSE: 133 MG/DL (ref 70–110)
POCT GLUCOSE: 177 MG/DL (ref 70–110)
POCT GLUCOSE: 68 MG/DL (ref 70–110)
POCT GLUCOSE: 68 MG/DL (ref 70–110)
POCT GLUCOSE: 78 MG/DL (ref 70–110)
POCT GLUCOSE: 78 MG/DL (ref 70–110)
POTASSIUM SERPL-SCNC: 4 MMOL/L (ref 3.5–5.1)
PROT SERPL-MCNC: 6.8 G/DL (ref 6–8.4)
RBC # BLD AUTO: 3.23 M/UL (ref 4.6–6.2)
SARS-COV-2 RDRP RESP QL NAA+PROBE: NEGATIVE
SODIUM SERPL-SCNC: 131 MMOL/L (ref 136–145)
WBC # BLD AUTO: 3.86 K/UL (ref 3.9–12.7)

## 2024-03-21 PROCEDURE — U0002 COVID-19 LAB TEST NON-CDC: HCPCS | Performed by: EMERGENCY MEDICINE

## 2024-03-21 PROCEDURE — 99223 1ST HOSP IP/OBS HIGH 75: CPT | Mod: ,,, | Performed by: NEUROLOGICAL SURGERY

## 2024-03-21 PROCEDURE — 77295 3-D RADIOTHERAPY PLAN: CPT | Mod: TC | Performed by: RADIOLOGY

## 2024-03-21 PROCEDURE — 99223 1ST HOSP IP/OBS HIGH 75: CPT | Mod: GC,,, | Performed by: HOSPITALIST

## 2024-03-21 PROCEDURE — 77290 THER RAD SIMULAJ FIELD CPLX: CPT | Mod: 26,,, | Performed by: RADIOLOGY

## 2024-03-21 PROCEDURE — 80053 COMPREHEN METABOLIC PANEL: CPT | Performed by: STUDENT IN AN ORGANIZED HEALTH CARE EDUCATION/TRAINING PROGRAM

## 2024-03-21 PROCEDURE — 77295 3-D RADIOTHERAPY PLAN: CPT | Mod: 26,,, | Performed by: RADIOLOGY

## 2024-03-21 PROCEDURE — 84100 ASSAY OF PHOSPHORUS: CPT | Performed by: STUDENT IN AN ORGANIZED HEALTH CARE EDUCATION/TRAINING PROGRAM

## 2024-03-21 PROCEDURE — 63600175 PHARM REV CODE 636 W HCPCS: Performed by: STUDENT IN AN ORGANIZED HEALTH CARE EDUCATION/TRAINING PROGRAM

## 2024-03-21 PROCEDURE — 25000003 PHARM REV CODE 250: Performed by: EMERGENCY MEDICINE

## 2024-03-21 PROCEDURE — 85025 COMPLETE CBC W/AUTO DIFF WBC: CPT | Performed by: STUDENT IN AN ORGANIZED HEALTH CARE EDUCATION/TRAINING PROGRAM

## 2024-03-21 PROCEDURE — 77300 RADIATION THERAPY DOSE PLAN: CPT | Mod: 26,,, | Performed by: RADIOLOGY

## 2024-03-21 PROCEDURE — A9585 GADOBUTROL INJECTION: HCPCS | Performed by: EMERGENCY MEDICINE

## 2024-03-21 PROCEDURE — 25000003 PHARM REV CODE 250

## 2024-03-21 PROCEDURE — 77427 RADIATION TX MANAGEMENT X5: CPT | Mod: ,,, | Performed by: RADIOLOGY

## 2024-03-21 PROCEDURE — 77014 HC CT GUIDANCE RADIATION THERAPY FLDS PLACEMENT: CPT | Mod: TC | Performed by: RADIOLOGY

## 2024-03-21 PROCEDURE — 63600175 PHARM REV CODE 636 W HCPCS: Performed by: EMERGENCY MEDICINE

## 2024-03-21 PROCEDURE — 25000003 PHARM REV CODE 250: Performed by: STUDENT IN AN ORGANIZED HEALTH CARE EDUCATION/TRAINING PROGRAM

## 2024-03-21 PROCEDURE — 96374 THER/PROPH/DIAG INJ IV PUSH: CPT

## 2024-03-21 PROCEDURE — 77334 RADIATION TREATMENT AID(S): CPT | Mod: TC | Performed by: RADIOLOGY

## 2024-03-21 PROCEDURE — 77290 THER RAD SIMULAJ FIELD CPLX: CPT | Mod: TC | Performed by: RADIOLOGY

## 2024-03-21 PROCEDURE — 99222 1ST HOSP IP/OBS MODERATE 55: CPT | Mod: 25,,, | Performed by: RADIOLOGY

## 2024-03-21 PROCEDURE — 20600001 HC STEP DOWN PRIVATE ROOM

## 2024-03-21 PROCEDURE — 83735 ASSAY OF MAGNESIUM: CPT | Performed by: STUDENT IN AN ORGANIZED HEALTH CARE EDUCATION/TRAINING PROGRAM

## 2024-03-21 PROCEDURE — 77263 THER RADIOLOGY TX PLNG CPLX: CPT | Mod: ,,, | Performed by: RADIOLOGY

## 2024-03-21 PROCEDURE — 77300 RADIATION THERAPY DOSE PLAN: CPT | Mod: TC | Performed by: RADIOLOGY

## 2024-03-21 PROCEDURE — 77334 RADIATION TREATMENT AID(S): CPT | Mod: 26,,, | Performed by: RADIOLOGY

## 2024-03-21 PROCEDURE — 63600175 PHARM REV CODE 636 W HCPCS

## 2024-03-21 PROCEDURE — 25500020 PHARM REV CODE 255: Performed by: EMERGENCY MEDICINE

## 2024-03-21 RX ORDER — GLUCAGON 1 MG
1 KIT INJECTION
Status: DISCONTINUED | OUTPATIENT
Start: 2024-03-21 | End: 2024-03-22 | Stop reason: HOSPADM

## 2024-03-21 RX ORDER — AMLODIPINE BESYLATE 10 MG/1
10 TABLET ORAL DAILY
Status: DISCONTINUED | OUTPATIENT
Start: 2024-03-21 | End: 2024-03-22 | Stop reason: HOSPADM

## 2024-03-21 RX ORDER — HYDROCHLOROTHIAZIDE 25 MG/1
25 TABLET ORAL DAILY
Status: DISCONTINUED | OUTPATIENT
Start: 2024-03-21 | End: 2024-03-22 | Stop reason: HOSPADM

## 2024-03-21 RX ORDER — GABAPENTIN 300 MG/1
900 CAPSULE ORAL 3 TIMES DAILY
Status: DISCONTINUED | OUTPATIENT
Start: 2024-03-21 | End: 2024-03-22 | Stop reason: HOSPADM

## 2024-03-21 RX ORDER — MORPHINE SULFATE 30 MG/1
30 TABLET, FILM COATED, EXTENDED RELEASE ORAL EVERY 12 HOURS
Status: DISCONTINUED | OUTPATIENT
Start: 2024-03-21 | End: 2024-03-22 | Stop reason: HOSPADM

## 2024-03-21 RX ORDER — SENNOSIDES 8.6 MG/1
8.6 TABLET ORAL DAILY
Status: DISCONTINUED | OUTPATIENT
Start: 2024-03-21 | End: 2024-03-22 | Stop reason: HOSPADM

## 2024-03-21 RX ORDER — MORPHINE SULFATE 30 MG/1
30 TABLET, FILM COATED, EXTENDED RELEASE ORAL 2 TIMES DAILY
COMMUNITY
End: 2024-04-02 | Stop reason: SDUPTHER

## 2024-03-21 RX ORDER — HYDROCODONE BITARTRATE AND ACETAMINOPHEN 10; 325 MG/1; MG/1
1 TABLET ORAL EVERY 8 HOURS PRN
Status: DISCONTINUED | OUTPATIENT
Start: 2024-03-21 | End: 2024-03-21

## 2024-03-21 RX ORDER — INSULIN ASPART 100 [IU]/ML
0-5 INJECTION, SOLUTION INTRAVENOUS; SUBCUTANEOUS
Status: DISCONTINUED | OUTPATIENT
Start: 2024-03-21 | End: 2024-03-22 | Stop reason: HOSPADM

## 2024-03-21 RX ORDER — LIDOCAINE 50 MG/G
2 PATCH TOPICAL
Status: DISCONTINUED | OUTPATIENT
Start: 2024-03-21 | End: 2024-03-22 | Stop reason: HOSPADM

## 2024-03-21 RX ORDER — NALOXONE HCL 0.4 MG/ML
0.02 VIAL (ML) INJECTION
Status: DISCONTINUED | OUTPATIENT
Start: 2024-03-21 | End: 2024-03-22 | Stop reason: HOSPADM

## 2024-03-21 RX ORDER — FLUOXETINE HYDROCHLORIDE 20 MG/1
80 CAPSULE ORAL DAILY
Status: DISCONTINUED | OUTPATIENT
Start: 2024-03-21 | End: 2024-03-22 | Stop reason: HOSPADM

## 2024-03-21 RX ORDER — LANOLIN ALCOHOL/MO/W.PET/CERES
1 CREAM (GRAM) TOPICAL DAILY
Status: DISCONTINUED | OUTPATIENT
Start: 2024-03-21 | End: 2024-03-22 | Stop reason: HOSPADM

## 2024-03-21 RX ORDER — ENOXAPARIN SODIUM 100 MG/ML
40 INJECTION SUBCUTANEOUS EVERY 12 HOURS
Status: DISCONTINUED | OUTPATIENT
Start: 2024-03-21 | End: 2024-03-22 | Stop reason: HOSPADM

## 2024-03-21 RX ORDER — MAGNESIUM SULFATE HEPTAHYDRATE 40 MG/ML
2 INJECTION, SOLUTION INTRAVENOUS ONCE
Status: COMPLETED | OUTPATIENT
Start: 2024-03-21 | End: 2024-03-21

## 2024-03-21 RX ORDER — MORPHINE SULFATE 4 MG/ML
4 INJECTION, SOLUTION INTRAMUSCULAR; INTRAVENOUS
Status: COMPLETED | OUTPATIENT
Start: 2024-03-21 | End: 2024-03-21

## 2024-03-21 RX ORDER — HYDROCODONE BITARTRATE AND ACETAMINOPHEN 10; 325 MG/1; MG/1
1 TABLET ORAL EVERY 8 HOURS PRN
Status: DISCONTINUED | OUTPATIENT
Start: 2024-03-21 | End: 2024-03-22 | Stop reason: HOSPADM

## 2024-03-21 RX ORDER — CETIRIZINE HYDROCHLORIDE 10 MG/1
10 TABLET ORAL DAILY
Status: DISCONTINUED | OUTPATIENT
Start: 2024-03-21 | End: 2024-03-22 | Stop reason: HOSPADM

## 2024-03-21 RX ORDER — METHOCARBAMOL 500 MG/1
500 TABLET, FILM COATED ORAL 3 TIMES DAILY PRN
Status: DISCONTINUED | OUTPATIENT
Start: 2024-03-21 | End: 2024-03-22 | Stop reason: HOSPADM

## 2024-03-21 RX ORDER — IBUPROFEN 200 MG
16 TABLET ORAL
Status: DISCONTINUED | OUTPATIENT
Start: 2024-03-21 | End: 2024-03-22 | Stop reason: HOSPADM

## 2024-03-21 RX ORDER — TRAZODONE HYDROCHLORIDE 100 MG/1
100 TABLET ORAL NIGHTLY
Status: DISCONTINUED | OUTPATIENT
Start: 2024-03-21 | End: 2024-03-22 | Stop reason: HOSPADM

## 2024-03-21 RX ORDER — DEXAMETHASONE SODIUM PHOSPHATE 4 MG/ML
4 INJECTION, SOLUTION INTRA-ARTICULAR; INTRALESIONAL; INTRAMUSCULAR; INTRAVENOUS; SOFT TISSUE EVERY 6 HOURS
Status: DISCONTINUED | OUTPATIENT
Start: 2024-03-21 | End: 2024-03-22 | Stop reason: HOSPADM

## 2024-03-21 RX ORDER — PANTOPRAZOLE SODIUM 40 MG/1
40 TABLET, DELAYED RELEASE ORAL DAILY
Status: DISCONTINUED | OUTPATIENT
Start: 2024-03-21 | End: 2024-03-22 | Stop reason: HOSPADM

## 2024-03-21 RX ORDER — POLYETHYLENE GLYCOL 3350 17 G/17G
17 POWDER, FOR SOLUTION ORAL 2 TIMES DAILY
Status: DISCONTINUED | OUTPATIENT
Start: 2024-03-21 | End: 2024-03-22 | Stop reason: HOSPADM

## 2024-03-21 RX ORDER — SODIUM CHLORIDE 0.9 % (FLUSH) 0.9 %
10 SYRINGE (ML) INJECTION EVERY 12 HOURS PRN
Status: DISCONTINUED | OUTPATIENT
Start: 2024-03-21 | End: 2024-03-22 | Stop reason: HOSPADM

## 2024-03-21 RX ORDER — HYDROMORPHONE HYDROCHLORIDE 1 MG/ML
1 INJECTION, SOLUTION INTRAMUSCULAR; INTRAVENOUS; SUBCUTANEOUS EVERY 4 HOURS PRN
Status: DISCONTINUED | OUTPATIENT
Start: 2024-03-21 | End: 2024-03-22 | Stop reason: HOSPADM

## 2024-03-21 RX ORDER — ONDANSETRON HYDROCHLORIDE 2 MG/ML
4 INJECTION, SOLUTION INTRAVENOUS EVERY 8 HOURS PRN
Status: DISCONTINUED | OUTPATIENT
Start: 2024-03-21 | End: 2024-03-22 | Stop reason: HOSPADM

## 2024-03-21 RX ORDER — INSULIN ASPART 100 [IU]/ML
3 INJECTION, SOLUTION INTRAVENOUS; SUBCUTANEOUS
Status: DISCONTINUED | OUTPATIENT
Start: 2024-03-21 | End: 2024-03-21

## 2024-03-21 RX ORDER — DEXAMETHASONE SODIUM PHOSPHATE 4 MG/ML
4 INJECTION, SOLUTION INTRA-ARTICULAR; INTRALESIONAL; INTRAMUSCULAR; INTRAVENOUS; SOFT TISSUE EVERY 12 HOURS
Status: DISCONTINUED | OUTPATIENT
Start: 2024-03-21 | End: 2024-03-21

## 2024-03-21 RX ORDER — LOSARTAN POTASSIUM 50 MG/1
100 TABLET ORAL DAILY
Status: DISCONTINUED | OUTPATIENT
Start: 2024-03-21 | End: 2024-03-22 | Stop reason: HOSPADM

## 2024-03-21 RX ORDER — HYDROCODONE BITARTRATE AND ACETAMINOPHEN 10; 325 MG/1; MG/1
1 TABLET ORAL
Status: COMPLETED | OUTPATIENT
Start: 2024-03-21 | End: 2024-03-21

## 2024-03-21 RX ORDER — IBUPROFEN 200 MG
24 TABLET ORAL
Status: DISCONTINUED | OUTPATIENT
Start: 2024-03-21 | End: 2024-03-22 | Stop reason: HOSPADM

## 2024-03-21 RX ADMIN — AMLODIPINE BESYLATE 10 MG: 10 TABLET ORAL at 09:03

## 2024-03-21 RX ADMIN — SENNOSIDES 8.6 MG: 8.6 TABLET, FILM COATED ORAL at 06:03

## 2024-03-21 RX ADMIN — GABAPENTIN 900 MG: 300 CAPSULE ORAL at 08:03

## 2024-03-21 RX ADMIN — MORPHINE SULFATE 30 MG: 30 TABLET, FILM COATED, EXTENDED RELEASE ORAL at 09:03

## 2024-03-21 RX ADMIN — HYDROMORPHONE HYDROCHLORIDE 1 MG: 1 INJECTION, SOLUTION INTRAMUSCULAR; INTRAVENOUS; SUBCUTANEOUS at 11:03

## 2024-03-21 RX ADMIN — ENOXAPARIN SODIUM 40 MG: 40 INJECTION SUBCUTANEOUS at 08:03

## 2024-03-21 RX ADMIN — TRAZODONE HYDROCHLORIDE 100 MG: 100 TABLET ORAL at 08:03

## 2024-03-21 RX ADMIN — HYDROCODONE BITARTRATE AND ACETAMINOPHEN 1 TABLET: 10; 325 TABLET ORAL at 01:03

## 2024-03-21 RX ADMIN — FERROUS SULFATE TAB EC 325 MG (65 MG FE EQUIVALENT) 1 EACH: 325 (65 FE) TABLET DELAYED RESPONSE at 09:03

## 2024-03-21 RX ADMIN — MORPHINE SULFATE 30 MG: 30 TABLET, FILM COATED, EXTENDED RELEASE ORAL at 02:03

## 2024-03-21 RX ADMIN — FLUOXETINE HYDROCHLORIDE 80 MG: 20 CAPSULE ORAL at 09:03

## 2024-03-21 RX ADMIN — ENOXAPARIN SODIUM 40 MG: 40 INJECTION SUBCUTANEOUS at 09:03

## 2024-03-21 RX ADMIN — DEXAMETHASONE SODIUM PHOSPHATE 4 MG: 4 INJECTION INTRA-ARTICULAR; INTRALESIONAL; INTRAMUSCULAR; INTRAVENOUS; SOFT TISSUE at 05:03

## 2024-03-21 RX ADMIN — DEXTROSE MONOHYDRATE 125 ML: 100 INJECTION, SOLUTION INTRAVENOUS at 09:03

## 2024-03-21 RX ADMIN — MORPHINE SULFATE 4 MG: 4 INJECTION INTRAVENOUS at 02:03

## 2024-03-21 RX ADMIN — PANTOPRAZOLE SODIUM 40 MG: 40 TABLET, DELAYED RELEASE ORAL at 11:03

## 2024-03-21 RX ADMIN — GABAPENTIN 900 MG: 300 CAPSULE ORAL at 03:03

## 2024-03-21 RX ADMIN — ONDANSETRON 4 MG: 2 INJECTION INTRAMUSCULAR; INTRAVENOUS at 11:03

## 2024-03-21 RX ADMIN — DEXAMETHASONE SODIUM PHOSPHATE 4 MG: 4 INJECTION INTRA-ARTICULAR; INTRALESIONAL; INTRAMUSCULAR; INTRAVENOUS; SOFT TISSUE at 11:03

## 2024-03-21 RX ADMIN — LOSARTAN POTASSIUM 100 MG: 50 TABLET, FILM COATED ORAL at 09:03

## 2024-03-21 RX ADMIN — CETIRIZINE HYDROCHLORIDE 10 MG: 10 TABLET, FILM COATED ORAL at 09:03

## 2024-03-21 RX ADMIN — DEXAMETHASONE SODIUM PHOSPHATE 4 MG: 4 INJECTION INTRA-ARTICULAR; INTRALESIONAL; INTRAMUSCULAR; INTRAVENOUS; SOFT TISSUE at 09:03

## 2024-03-21 RX ADMIN — MORPHINE SULFATE 30 MG: 30 TABLET, FILM COATED, EXTENDED RELEASE ORAL at 08:03

## 2024-03-21 RX ADMIN — POLYETHYLENE GLYCOL 3350 17 G: 17 POWDER, FOR SOLUTION ORAL at 06:03

## 2024-03-21 RX ADMIN — LIDOCAINE 5% 2 PATCH: 700 PATCH TOPICAL at 05:03

## 2024-03-21 RX ADMIN — HYDROCHLOROTHIAZIDE 25 MG: 25 TABLET ORAL at 09:03

## 2024-03-21 RX ADMIN — MAGNESIUM SULFATE HEPTAHYDRATE 2 G: 40 INJECTION, SOLUTION INTRAVENOUS at 06:03

## 2024-03-21 RX ADMIN — GABAPENTIN 900 MG: 300 CAPSULE ORAL at 09:03

## 2024-03-21 RX ADMIN — METHOCARBAMOL 500 MG: 500 TABLET ORAL at 06:03

## 2024-03-21 NOTE — H&P
"    [facility] - Oncology ([facility])  Hematology/Oncology  H&P    Patient Name: Paul Li Jr.  MRN: [id number]  Admission Date: 3/20/2024  Code Status: Full Code   Attending Provider: Melony Varela MD  Primary Care Physician: Kareem Arroyo MD  Principal Problem:Vision loss of left eye    Subjective:     HPI: 49 yo M with metastatic triple negative breast cancer with BRCA1 mutation (on sacituzumab-govetican Q3w cycle 4, last 2/12), T2DM, HTN, presenting with acute on chronic vision loss. Patient evaluated at bedside virtually. Patient reports vision loss was acute in L eye this AM after waking up. Patient reports vision was normal in R eye which was improved but noticed no vision in L eye. Patient wife also reported change in eye movement and described as "lazy". No associated pain with vision loss or eye movement. Patient reports retro orbital pain that radiates to posterior. Patient also reports pain with pressure on L side. Patient reports new onset nausea/ vomiting associated with HA. Patient reports taking hydrocodone and morphine with no relief. Patient denies any recent infectious symptoms including fever chills, eye discharge, cough, SOB or GI symptoms. Patient denies any urinary symptoms.      In the ED patient was afebrile, normotensive and normocardic with SpO2 >95 on RA. Labs were significant for WBC 4.8, hgb 8.5 at baseline, , Na 135, K 4.1, CO2 23, BUN 16 and Cr 1.1. TSH wnl and Coag wnl. Stroke code was activated and CTA demonstrated "Irregularity of the intracranial vertebral arteries and left PCA as above, likely atherosclerotic disease.  No high-grade stenosis or large vessel occlusion. Osseous metastatic disease similar to prior outside MRI." Opthalmology was consulted who recommended MRI which demonstrated " There is abnormal soft tissue lesion in the posterior left orbital apex/optic canal which is adjacent to or involving the inferior rectus and medial rectus extra-ocular muscles " "with associated enhancement, and could represent metastatic disease.  This abuts and compresses the posterior optic nerve at the posterior left orbital apex. There is minimal thickening and increased T2 signal with probable minimal enhancement of the anterior left optic nerve that could represent mild optic neuritis." Patient admitted to medical oncology for further management of acute vision loss.         Oncology History  Malignant neoplasm involving both nipple and areola of right breast in male, estrogen receptor negative  stage IB, now metastatic  Metastatic Triple Negative Breast Cancer: progressed from prior Stage IB   5/2/2019: Right breast retroareolar core biopsy  S/p Right breast mastectomy with axillary lymph node dissection in 2019  S/p neoadjuvant ddAC/carbo-taxol, adjuvant xeloda, XRT   Recurrence with bone metastasis in 8/2021, received abraxane and progressed on it in 3/2023  Started on Trodelvy in 5/2023 - ongoing  - His schedule of trodelvy is [id number] with growth factor support due to   neutropenia   Liquid biopsy: PIK3CA mutation  PDL 1 <1%  Precerted for Sacituzumab govitecan-hziy-for a transfer of facility  - PET CT 10/10/23:               - reviewed with previous images  - upon our review, exam is stable except subcarinal LN increase in size (max SUV of 10.9 measuring 2.4 x 2.7 cm, previously was 1.5cm with max SUV 10)  - MRI C spine from 12/15/23 reviewed, shows degenerative changes but no concerning metastatic lesions. Reports for lumbar spine and thoracic spine not uploaded, have requested from [facility]. Reviewed images, show known T6 lesion (seen on PET scan Oct 2023) but no other clear lesions.     Oncology Treatment Plan:   OP BREAST FULVESTRANT    Medications:  Continuous Infusions:  Scheduled Meds:   amLODIPine  10 mg Oral Daily    cetirizine  10 mg Oral Daily    dexAMETHasone  4 mg Intravenous Q6H    enoxparin  40 mg Subcutaneous Q12H    ferrous sulfate  1 tablet Oral Daily    " FLUoxetine  80 mg Oral Daily    gabapentin  900 mg Oral TID    hydroCHLOROthiazide  25 mg Oral Daily    LIDOcaine  2 patch Transdermal Q24H    losartan  100 mg Oral Daily    morphine  30 mg Oral Q12H    pantoprazole  40 mg Oral Daily    traZODone  100 mg Oral QHS     PRN Meds:dextrose 10%, dextrose 10%, glucagon (human recombinant), glucose, glucose, HYDROcodone-acetaminophen, HYDROmorphone, insulin aspart U-100, methocarbamoL, naloxone, ondansetron, sodium chloride 0.9%     Review of patient's allergies indicates:  No Known Allergies     Past Medical History:   Diagnosis Date    Breast cancer     Cancer     R breast    Diabetes mellitus     HTN (hypertension)     Leg edema, right     Prediabetes     Seizures     at age 18 - stress related    Therapy     marital counseling     Past Surgical History:   Procedure Laterality Date    AXILLARY NODE DISSECTION Right 11/22/2019    Procedure: LYMPHADENECTOMY, AXILLARY RIGHT;  Surgeon: Shima Whyte MD;  Location: [facility];  Service: General;  Laterality: Right;    AXILLARY NODE DISSECTION Right 12/17/2019    Procedure: LYMPHADENECTOMY, AXILLARY;  Surgeon: Shima Whyte MD;  Location: [address];  Service: General;  Laterality: Right;    BREAST BIOPSY      EXCISION OF HYDROCELE Right 10/28/2022    Procedure: HYDROCELECTOMY;  Surgeon: Anthony Cordero Jr., MD;  Location: [facility] OR [address] [place] [address];  Service: Urology;  Laterality: Right;  1 hour    INSERTION OF TUNNELED CENTRAL VENOUS CATHETER (CVC) WITH SUBCUTANEOUS PORT Left 5/24/2019    Procedure: [id number];  Surgeon: Shima Whyte MD;  Location: [facility] OR [address] [place] [address];  Service: General;  Laterality: Left;    INSERTION OF TUNNELED CENTRAL VENOUS CATHETER (CVC) WITH SUBCUTANEOUS PORT Left 9/17/2021    Procedure: INSERTION, SINGLE LUMEN CATHETER WITH PORT, WITH FLUOROSCOPIC GUIDANCE, right;  Surgeon: Gloria Holliday MD;  [facility];  Service: General;  Laterality: Left;  rapid covid test    SENTINEL LYMPH  NODE BIOPSY Right 2019    Procedure: BIOPSY, LYMPH NODE, SENTINEL RIGHT;  Surgeon: Shima Whyte MD;  Location: [facility] OR;  Service: General;  Laterality: Right;    SIMPLE MASTECTOMY Right 2019    Procedure: MASTECTOMY, SIMPLE RIGHT (CONSENT AM OF) 2.5 hr case;  Surgeon: Shima Whyte MD;  Location: [facility] OR;  Service: General;  Laterality: Right;     Family History       Problem Relation (Age of Onset)    Breast cancer Paternal Grandmother    Colon cancer Maternal Grandfather    Diabetes Mother    Fibromyalgia Paternal Aunt    Hypertension Mother, Father    Lupus Father, Paternal Aunt    No Known Problems Maternal Grandmother, Paternal Grandfather, Sister, Maternal Aunt, Maternal Uncle, Paternal Uncle, Brother, Sister, Son, Son, Son, Son    Post-traumatic stress disorder Brother          Tobacco Use    Smoking status: Former     Current packs/day: 0.00     Average packs/day: 0.3 packs/day for 15.0 years (3.8 ttl pk-yrs)     Types: Cigarettes, Vaping with nicotine     Start date: 1999     Quit date: 2014     Years since quittin.3    Smokeless tobacco: Never    Tobacco comments:     Social smoker with alcohol    Substance and Sexual Activity    Alcohol use: Yes     Alcohol/week: 0.0 standard drinks of alcohol     Comment: Rarely    Drug use: Not Currently     Types: Marijuana    Sexual activity: Yes     Partners: Female     Birth control/protection: None       Review of Systems   Constitutional:  Negative for fever.   HENT:  Negative for sore throat.    Eyes:  Positive for visual disturbance.   Respiratory:  Negative for cough, shortness of breath and wheezing.    Cardiovascular:  Negative for chest pain, palpitations and leg swelling.   Gastrointestinal:  Positive for nausea. Negative for abdominal pain, constipation, diarrhea and vomiting.   Genitourinary:  Negative for dysuria and urgency.   Musculoskeletal:  Positive for back pain.   Skin:  Negative for rash.   Neurological:  Negative for  light-headedness.   Psychiatric/Behavioral:  Negative for confusion.      Objective:     Vital Signs (Most Recent):  Temp: 98.6 °F (37 °C) (03/21/24 0759)  Pulse: 64 (03/21/24 1108)  Resp: 19 (03/21/24 1141)  BP: (!) 123/58 (03/21/24 0759)  SpO2: 99 % (03/21/24 0759) Vital Signs (24h Range):  Temp:  [98.3 °F (36.8 °C)-98.6 °F (37 °C)] 98.6 °F (37 °C)  Pulse:  [60-74] 64  Resp:  [16-20] 19  SpO2:  [98 %-100 %] 99 %  BP: (123-138)/(58-63) 123/58     Weight: (!) 141.5 kg (311 lb 15.9 oz)  Body mass index is 41.16 kg/m².  Body surface area is 2.7 meters squared.    No intake or output data in the 24 hours ending 03/21/24 1312     Physical Exam  HENT:      Head: Normocephalic.      Right Ear: External ear normal.      Left Ear: External ear normal.   Eyes:      Extraocular Movements: Extraocular movements intact.      Comments: Left monocular vision loss   Cardiovascular:      Rate and Rhythm: Normal rate and regular rhythm.      Pulses: Normal pulses.      Heart sounds: Normal heart sounds.   Pulmonary:      Effort: Pulmonary effort is normal.      Breath sounds: Normal breath sounds.   Abdominal:      General: Abdomen is flat.      Palpations: Abdomen is soft.   Musculoskeletal:         General: Normal range of motion.   Skin:     General: Skin is warm.      Capillary Refill: Capillary refill takes less than 2 seconds.   Neurological:      General: No focal deficit present.      Mental Status: He is alert and oriented to person, place, and time.          Significant Labs:   CBC:   Recent Labs   Lab 03/20/24 1927 03/21/24  0353   WBC 4.88 3.86*   HGB 8.5* 7.6*   HCT 28.8* 24.4*    336    and CMP:   Recent Labs   Lab 03/20/24 1927 03/21/24  0353   * 131*   K 4.1 4.0    101   CO2 23 21*   GLU 84 84   BUN 16 12   CREATININE 1.1 0.9   CALCIUM 9.7 8.9   PROT 8.4 6.8   ALBUMIN 3.3* 2.9*   BILITOT 0.5 0.5   ALKPHOS 133 127   AST 30 25   ALT 14 11   ANIONGAP 10 9       Diagnostic Results:  I have reviewed  "all pertinent imaging results/findings within the past 24 hours.  Assessment/Plan:     * Vision loss of left eye  Patient presents to [facility] with complaint of acute worsening of vision loss in L eye. Reported 1st episode 2 month prior with worsening vision in the left eye, Recalls episodes of light sensitivity in this eye prior to vision decline. Seen by ophthalmology with normal examination. MRI brain/orbit demonstrated "abnormal soft tissue lesion in the posterior left orbital apex/optic canal which is adjacent to or involving the inferior rectus and medial rectus extra-ocular muscles with associated enhancement, and could represent metastatic disease..." NSGY/optho consulted and given concern for lesion encased in optic nerve, deferring to radiation onc for focused RT    - Rad/onc consulted  - NSGY following  - Cleared for diet  - Dex 4 mg Q6  - neuro checks      Cancer related pain  Patient w/ progressive eye pain, retro-orbital, aching in nature that has worsened over the past 6 weeks. MRI revealing orbital mass concerning for metastatic dz causing associated pain.    - Dex q6hr  - Percocet/dilaudid PRN    Secondary malignant neoplasm of brain  NSGY following, radiation oncology consulted for focused RT    Neuropathy  Home gabapentin    Uncontrolled type 2 diabetes mellitus with hyperglycemia  Patient w/ prior hx of DM, now w/ recent A1c 5.6 11/23    - -180 IP  - LDSSI PRN for hyperglycemia, hold if hypoglycemic    Microcytic anemia  Baseline 8-9. MCV 76. Iron studies consistent with JOSÉ MIGUEL.      Continue PO iron    Malignant neoplasm involving both nipple and areola of right breast in male, estrogen receptor negative  Patient w/ a hx of TNBC of right breast on SACITUZUMAB q 2wk with Neulasta with last dose 2/14/24. Patient w/ MRI findings concerning for metastatic dz of the R globe.    - No role for IP chemo/immuno    Primary hypertension  Continue home amlodipine, losartan and HCTZ. Potential increase given " steroid use.          Dequan Guido MD  Hematology/Oncology  [facility] ([facility])

## 2024-03-21 NOTE — SUBJECTIVE & OBJECTIVE
Past Medical History:   Diagnosis Date    Breast cancer     Cancer     R breast    Diabetes mellitus     HTN (hypertension)     Leg edema, right     Prediabetes     Seizures     at age 18 - stress related    Therapy     marital counseling       Past Surgical History:   Procedure Laterality Date    AXILLARY NODE DISSECTION Right 11/22/2019    Procedure: LYMPHADENECTOMY, AXILLARY RIGHT;  Surgeon: Shima Whyte MD;  Location: [facility];  Service: General;  Laterality: Right;    AXILLARY NODE DISSECTION Right 12/17/2019    Procedure: LYMPHADENECTOMY, AXILLARY;  Surgeon: Shima Whyte MD;  Location: [facility] OR [address] [place] [address];  Service: General;  Laterality: Right;    BREAST BIOPSY      EXCISION OF HYDROCELE Right 10/28/2022    Procedure: HYDROCELECTOMY;  Surgeon: Anthony Cordero Jr., MD;  Location: [facility] OR [address] [place] [address];  Service: Urology;  Laterality: Right;  1 hour    INSERTION OF TUNNELED CENTRAL VENOUS CATHETER (CVC) WITH SUBCUTANEOUS PORT Left 5/24/2019    Procedure: [id number];  Surgeon: Shima Whyte MD;  Location: [facility] OR [address] [place] [address];  Service: General;  Laterality: Left;    INSERTION OF TUNNELED CENTRAL VENOUS CATHETER (CVC) WITH SUBCUTANEOUS PORT Left 9/17/2021    Procedure: INSERTION, SINGLE LUMEN CATHETER WITH PORT, WITH FLUOROSCOPIC GUIDANCE, right;  Surgeon: Gloria Holliday MD;  Location: [facility] OR [address] [place] [address];  Service: General;  Laterality: Left;  rapid covid test    SENTINEL LYMPH NODE BIOPSY Right 11/22/2019    Procedure: BIOPSY, LYMPH NODE, SENTINEL RIGHT;  Surgeon: Shima Whyte MD;  Location: [facility];  Service: General;  Laterality: Right;    SIMPLE MASTECTOMY Right 11/22/2019    Procedure: MASTECTOMY, SIMPLE RIGHT (CONSENT AM OF) 2.5 hr case;  Surgeon: Shima Whyte MD;  Location: [facility];  Service: General;  Laterality: Right;       Review of patient's allergies indicates:  No Known Allergies    No current facility-administered medications on file prior to encounter.     Current Outpatient Medications on File  Prior to Encounter   Medication Sig    ALPRAZolam (XANAX) 0.5 MG tablet Take 1 tablet (0.5 mg total) by mouth 3 (three) times daily as needed for Insomnia or Anxiety.    amLODIPine (NORVASC) 10 MG tablet TAKE 1 TABLET (10 MG) BY MOUTH EVERY DAY    bisacodyL (DULCOLAX) 5 mg EC tablet Take 1 tablet (5 mg total) by mouth daily as needed for Constipation.    calcium-vitamin D3 (OYSTER SHELL CALCIUM-VIT D3) 500 mg-5 mcg (200 unit) per tablet Take 1 tablet by mouth 2 (two) times daily.    diclofenac sodium (VOLTAREN) 1 % Gel Apply 2 g topically 4 (four) times daily. Apply to the low back for inflammation control.    diphenoxylate-atropine 2.5-0.025 mg (LOMOTIL) 2.5-0.025 mg per tablet Take 1 tablet by mouth 4 (four) times daily as needed for Diarrhea.    dulaglutide (TRULICITY) 1.5 mg/0.5 mL pen injector Inject 1.5 mg into the skin once a week.    ferrous sulfate (IRON) 325 mg (65 mg iron) Tab tablet Take 1 tablet (325 mg total) by mouth once daily.    FLUoxetine 40 MG capsule Take 2 capsules (80 mg total) by mouth once daily.    gabapentin (NEURONTIN) 300 MG capsule Take 3 capsules (900 mg total) by mouth 3 (three) times daily.    hydroCHLOROthiazide (HYDRODIURIL) 25 MG tablet TAKE 1 TABLET BY MOUTH ONCE DAILY    HYDROcodone-acetaminophen (NORCO)  mg per tablet Take 1 tablet by mouth every 8 (eight) hours as needed for Pain.    ibuprofen (ADVIL,MOTRIN) 600 MG tablet Take 1 tablet (600 mg total) by mouth every 8 (eight) hours as needed for Pain.    insulin lispro (HUMALOG KWIKPEN INSULIN) 100 unit/mL pen Inject 5 Units into the skin 3 (three) times daily.    lancets 33 gauge Misc 1 lancet by Misc.(Non-Drug; Combo Route) route once daily.    lancing device Misc 1 Device by Misc.(Non-Drug; Combo Route) route 2 (two) times daily with meals.    LEVEMIR FLEXPEN 100 unit/mL (3 mL) InPn pen INJECT 21 UNITS INTO THE SKIN EVERY EVENING.    LIDOcaine (LIDODERM) 5 % Place 1 patch onto the skin once daily. Remove & Discard  "patch within 12 hours or as directed by MD    LIDOcaine (LIDODERM) 5 % Place 1 patch onto the skin once daily. Remove & Discard patch within 12 hours or as directed by MD    loratadine (CLARITIN) 10 mg tablet Take 1 tablet (10 mg total) by mouth once daily.    losartan (COZAAR) 50 MG tablet Take 2 tablets by mouth once daily    metFORMIN (GLUCOPHAGE) 500 MG tablet Take 2 tablets (1,000 mg total) by mouth 2 (two) times daily with meals. Needs appointment for future refills    OLANZapine (ZYPREXA) 5 MG tablet Take 1 tablet (5 mg total) by mouth every evening. days 1-4 of each chemotherapy cycle.    ondansetron (ZOFRAN-ODT) 8 MG TbDL Dissolve 1 tablet (8 mg total) by mouth or dissolve on tongue every 8 (eight) hours as needed (nausea/vomiting).  Let saliva dissolve tablet.    pen needle, diabetic (BD ULTRA-FINE TANESHA PEN NEEDLE) 32 gauge x 5/32" Ndle Use as directed with novolog and levemir (4 times daily)    ribociclib (KISQALI) 600 mg/day (200 mg x 3) Tab Take 600 mg by mouth once daily.    tadalafiL (CIALIS) 5 MG tablet Take 2 tablets (10 mg total) by mouth daily as needed for Erectile Dysfunction.    traZODone (DESYREL) 100 MG tablet Take 1 tablet (100 mg total) by mouth every evening.    [DISCONTINUED] insulin aspart U-100 (NOVOLOG FLEXPEN U-100 INSULIN) 100 unit/mL (3 mL) InPn pen Inject 5 Units into the skin 3 (three) times daily with meals. Use sliding scale provided to patient    [DISCONTINUED] losartan-hydrochlorothiazide 100-25 mg (HYZAAR) 100-25 mg per tablet TAKE 1 TABLET BY MOUTH EVERY DAY     Family History       Problem Relation (Age of Onset)    Breast cancer Paternal Grandmother    Colon cancer Maternal Grandfather    Diabetes Mother    Fibromyalgia Paternal Aunt    Hypertension Mother, Father    Lupus Father, Paternal Aunt    No Known Problems Maternal Grandmother, Paternal Grandfather, Sister, Maternal Aunt, Maternal Uncle, Paternal Uncle, Brother, Sister, Son, Son, Son, Son    Post-traumatic stress " disorder Brother          Tobacco Use    Smoking status: Former     Current packs/day: 0.00     Average packs/day: 0.3 packs/day for 15.0 years (3.8 ttl pk-yrs)     Types: Cigarettes, Vaping with nicotine     Start date: 1999     Quit date: 2014     Years since quittin.3    Smokeless tobacco: Never    Tobacco comments:     Social smoker with alcohol    Substance and Sexual Activity    Alcohol use: Yes     Alcohol/week: 0.0 standard drinks of alcohol     Comment: Rarely    Drug use: Not Currently     Types: Marijuana    Sexual activity: Yes     Partners: Female     Birth control/protection: None     Review of Systems   Constitutional:  Negative for chills, fatigue and fever.   HENT:  Positive for congestion, sinus pressure and sinus pain. Negative for hearing loss and trouble swallowing.    Eyes:  Positive for visual disturbance. Negative for pain and discharge.   Respiratory:  Negative for cough, chest tightness and shortness of breath.    Cardiovascular:  Negative for chest pain, palpitations and leg swelling.   Gastrointestinal:  Positive for nausea and vomiting. Negative for abdominal pain, blood in stool, constipation and diarrhea.   Genitourinary:  Negative for dysuria and hematuria.   Musculoskeletal:  Negative for neck pain and neck stiffness.   Skin:  Negative for rash.   Neurological:  Positive for headaches. Negative for dizziness, seizures, facial asymmetry, speech difficulty, weakness, light-headedness and numbness.     Objective:     Vital Signs (Most Recent):  Temp: 98.3 °F (36.8 °C) (24)  Pulse: 74 (24)  Resp: 16 (24 0207)  BP: 138/63 (24)  SpO2: 100 % (24) Vital Signs (24h Range):  Temp:  [98.3 °F (36.8 °C)] 98.3 °F (36.8 °C)  Pulse:  [74] 74  Resp:  [16-20] 16  SpO2:  [100 %] 100 %  BP: (138)/(63) 138/63     Weight: (!) 141.5 kg (312 lb)  Body mass index is 41.16 kg/m².     Physical Exam  Vitals and nursing note reviewed.    Constitutional:       General: He is not in acute distress.  HENT:      Head: Normocephalic.      Mouth/Throat:      Mouth: Mucous membranes are moist.   Eyes:      Pupils: Pupils are unequal.   Cardiovascular:      Rate and Rhythm: Normal rate.   Pulmonary:      Effort: Pulmonary effort is normal. No respiratory distress.   Abdominal:      General: There is no distension.      Palpations: Abdomen is soft.      Tenderness: There is no abdominal tenderness.   Musculoskeletal:      Cervical back: Normal range of motion.      Right lower leg: No edema.      Left lower leg: No edema.   Neurological:      Mental Status: He is alert and oriented to person, place, and time.      Cranial Nerves: Cranial nerve deficit present.      Motor: No weakness.   Psychiatric:         Mood and Affect: Mood normal.         Behavior: Behavior normal.              CRANIAL NERVES     CN II   Visual acuity: decreased    CN III, IV, VI   Pupils: unequal  CN III: left CN III palsy  CN VI: left CN VI palsy  Upgaze: abnormal  Downgaze: abnormal  Conjugate gaze: absent       Significant Labs: All pertinent labs within the past 24 hours have been reviewed.  CBC:   Recent Labs   Lab 03/20/24 1927 03/21/24  0353   WBC 4.88 3.86*   HGB 8.5* 7.6*   HCT 28.8* 24.4*    336     CMP:   Recent Labs   Lab 03/20/24 1927 03/21/24  0353   * 131*   K 4.1 4.0    101   CO2 23 21*   GLU 84 84   BUN 16 12   CREATININE 1.1 0.9   CALCIUM 9.7 8.9   PROT 8.4 6.8   ALBUMIN 3.3* 2.9*   BILITOT 0.5 0.5   ALKPHOS 133 127   AST 30 25   ALT 14 11   ANIONGAP 10 9     POCT Glucose:   Recent Labs   Lab 03/20/24  1934   POCTGLUCOSE 85       Significant Imaging: I have reviewed all pertinent imaging results/findings within the past 24 hours.

## 2024-03-21 NOTE — PROGRESS NOTES
Consultation Report  Ophthalmology Service    Date: 03/21/2024    Chief complaint/Reason for Consult: blurry vision left eye     History of Present Illness: Paul Li Jr. is a 48 y.o. male who presents with blurry vision left eye with painless, sudden onset earlier this morning. He has a history of metastatic breast cancer s/p mastectomy 2019 with mets to spine and pelvis. Notes decreased vision for past several months, seen by ophtho who examined and noted normal exam. Was supposed to return for follow up oct n/mac did not return. Today he notes decreased vision in his left eye. Denies flashes of light, floaters, curtain, halos, glare, redness, pain.     POcularHx: Refractive error with astigmatism, wears glasses    Current eye gtts: none      PMHx:  has a past medical history of Breast cancer, Cancer, Diabetes mellitus, HTN (hypertension), Leg edema, right, Prediabetes, Seizures, and Therapy.     PSurgHx:  has a past surgical history that includes Insertion of tunneled central venous catheter (CVC) with subcutaneous port (Left, 5/24/2019); Simple mastectomy (Right, 11/22/2019); Ruffin lymph node biopsy (Right, 11/22/2019); Axillary node dissection (Right, 11/22/2019); Axillary node dissection (Right, 12/17/2019); Breast biopsy; Insertion of tunneled central venous catheter (CVC) with subcutaneous port (Left, 9/17/2021); and Excision of hydrocele (Right, 10/28/2022).     Home Medications:   Prior to Admission medications    Medication Sig Start Date End Date Taking? Authorizing Provider   ALPRAZolam (XANAX) 0.5 MG tablet Take 1 tablet (0.5 mg total) by mouth 3 (three) times daily as needed for Insomnia or Anxiety. 2/14/24 3/15/24  Gabe Cooper MD   amLODIPine (NORVASC) 10 MG tablet TAKE 1 TABLET (10 MG) BY MOUTH EVERY DAY 11/9/23   Richard Keys NP   bisacodyL (DULCOLAX) 5 mg EC tablet Take 1 tablet (5 mg total) by mouth daily as needed for Constipation. 1/3/24   Gabe Cooper MD   calcium-vitamin D3  (OYSTER SHELL CALCIUM-VIT D3) 500 mg-5 mcg (200 unit) per tablet Take 1 tablet by mouth 2 (two) times daily. 12/22/23 12/21/24  Cecily Walsh MD   diclofenac sodium (VOLTAREN) 1 % Gel Apply 2 g topically 4 (four) times daily. Apply to the low back for inflammation control. 12/13/23   Cecily Walsh MD   diphenoxylate-atropine 2.5-0.025 mg (LOMOTIL) 2.5-0.025 mg per tablet Take 1 tablet by mouth 4 (four) times daily as needed for Diarrhea. 5/25/23   Angeles Rodriguez MD   dulaglutide (TRULICITY) 1.5 mg/0.5 mL pen injector Inject 1.5 mg into the skin once a week. 2/25/24   Kareem Arroyo MD   ferrous sulfate (IRON) 325 mg (65 mg iron) Tab tablet Take 1 tablet (325 mg total) by mouth once daily. 1/24/24   Gabe Cooper MD   FLUoxetine 40 MG capsule Take 2 capsules (80 mg total) by mouth once daily. 2/29/24 4/29/24  Rosa Linn MD   gabapentin (NEURONTIN) 300 MG capsule Take 3 capsules (900 mg total) by mouth 3 (three) times daily. 1/24/24   Lynn Adorno DNP   hydroCHLOROthiazide (HYDRODIURIL) 25 MG tablet TAKE 1 TABLET BY MOUTH ONCE DAILY 11/20/23   Richard Keys, NP   HYDROcodone-acetaminophen (NORCO)  mg per tablet Take 1 tablet by mouth every 8 (eight) hours as needed for Pain. 2/14/24   Gabe Cooper MD   ibuprofen (ADVIL,MOTRIN) 600 MG tablet Take 1 tablet (600 mg total) by mouth every 8 (eight) hours as needed for Pain. 7/17/23   Rosa Linn MD   insulin lispro (HUMALOG KWIKPEN INSULIN) 100 unit/mL pen Inject 5 Units into the skin 3 (three) times daily. 9/13/23 9/12/24  Kareem Arroyo MD   lancets 33 gauge Misc 1 lancet by Misc.(Non-Drug; Combo Route) route once daily. 5/12/22   Kareem Arroyo MD   lancing device Misc 1 Device by Misc.(Non-Drug; Combo Route) route 2 (two) times daily with meals. 10/12/19 10/28/22  Katelynn Bae MD   LEVEMIR FLEXPEN 100 unit/mL (3 mL) InPn pen INJECT 21 UNITS INTO THE SKIN EVERY EVENING. 12/12/23 3/11/24  Cruz,  "Kareem FRIEDMAN MD   LIDOcaine (LIDODERM) 5 % Place 1 patch onto the skin once daily. Remove & Discard patch within 12 hours or as directed by MD 3/29/23   Richard Keys NP   LIDOcaine (LIDODERM) 5 % Place 1 patch onto the skin once daily. Remove & Discard patch within 12 hours or as directed by MD 3/29/23   Rosa Linn MD   loratadine (CLARITIN) 10 mg tablet Take 1 tablet (10 mg total) by mouth once daily. 7/13/23 7/12/24  Richard Keys NP   losartan (COZAAR) 50 MG tablet Take 2 tablets by mouth once daily 11/9/23   Richard Keys NP   metFORMIN (GLUCOPHAGE) 500 MG tablet Take 2 tablets (1,000 mg total) by mouth 2 (two) times daily with meals. Needs appointment for future refills 12/18/23 4/12/24  Michelle Grayson NP   OLANZapine (ZYPREXA) 5 MG tablet Take 1 tablet (5 mg total) by mouth every evening. days 1-4 of each chemotherapy cycle. 1/3/24   Gabe Cooper MD   ondansetron (ZOFRAN-ODT) 8 MG TbDL Dissolve 1 tablet (8 mg total) by mouth or dissolve on tongue every 8 (eight) hours as needed (nausea/vomiting).  Let saliva dissolve tablet. 8/30/23   Lisette Tuttle MD   pen needle, diabetic (BD ULTRA-FINE TANESHA PEN NEEDLE) 32 gauge x 5/32" Ndle Use as directed with novolog and levemir (4 times daily) 3/30/23   Kareem Arroyo MD   ribociclib (KISQALI) 600 mg/day (200 mg x 3) Tab Take 600 mg by mouth once daily. 2/28/24   Paul Gutierrez MD   tadalafiL (CIALIS) 5 MG tablet Take 2 tablets (10 mg total) by mouth daily as needed for Erectile Dysfunction. 11/16/22 11/16/23  Michelle Grayson NP   traZODone (DESYREL) 100 MG tablet Take 1 tablet (100 mg total) by mouth every evening. 2/29/24 5/29/24  Kareem Arroyo MD   insulin aspart U-100 (NOVOLOG FLEXPEN U-100 INSULIN) 100 unit/mL (3 mL) InPn pen Inject 5 Units into the skin 3 (three) times daily with meals. Use sliding scale provided to patient 11/12/21 1/25/22  Kareem Arroyo MD   losartan-hydrochlorothiazide 100-25 mg " (HYZAAR) 100-25 mg per tablet TAKE 1 TABLET BY MOUTH EVERY DAY 10/25/19 10/27/19  Kareem Arroyo MD        Medications this encounter:    amLODIPine  10 mg Oral Daily    cetirizine  10 mg Oral Daily    dexAMETHasone  4 mg Intravenous Q6H    enoxparin  40 mg Subcutaneous Q12H    ferrous sulfate  1 tablet Oral Daily    FLUoxetine  80 mg Oral Daily    gabapentin  900 mg Oral TID    hydroCHLOROthiazide  25 mg Oral Daily    LIDOcaine  2 patch Transdermal Q24H    losartan  100 mg Oral Daily    morphine  30 mg Oral Q12H    pantoprazole  40 mg Oral Daily    traZODone  100 mg Oral QHS       Allergies: has No Known Allergies.     Social:  reports that he quit smoking about 9 years ago. His smoking use included cigarettes and vaping with nicotine. He started smoking about 24 years ago. He has a 3.8 pack-year smoking history. He has never used smokeless tobacco. He reports current alcohol use. He reports that he does not currently use drugs after having used the following drugs: Marijuana.     Family Hx: No family history of glaucoma, macular degeneration, or blindness. family history includes Breast cancer in his paternal grandmother; Colon cancer in his maternal grandfather; Diabetes in his mother; Fibromyalgia in his paternal aunt; Hypertension in his father and mother; Lupus in his father and paternal aunt; No Known Problems in his brother, maternal aunt, maternal grandmother, maternal uncle, paternal grandfather, paternal uncle, sister, sister, son, son, son, and son; Post-traumatic stress disorder in his brother.     ROS: see hpi     Ocular examination/Dilated fundus examination:  Base Eye Exam       Visual Acuity (Snellen - Linear)         Right Left    Dist cc 20/25 HM              Tonometry (Tonopen, 9:44 PM)         Right Left    Pressure 17 17              Pupils         Dark Light Shape React APD    Right 4 2 Round Brisk None    Left 3 3 Round Minimal present              Extraocular Movement         Right  Left     -- -- --   --  --   -- -- --    -4 -4 -4   -4  -4   -4 -4 -4                 Neuro/Psych       Oriented x3: Yes    Mood/Affect: Normal              Dilation       Both eyes: 1% Mydriacyl, 2.5% Phenylephrine @ 9:45 PM                  Additional Tests       Color         Right Left    Ishihara 12/12 TEO                  Slit Lamp and Fundus Exam       External Exam         Right Left    External Normal Normal              Slit Lamp Exam         Right Left    Lids/Lashes Meibomian gland dysfunction; 2 capped glands UL  Meibomian gland dysfunction    Conjunctiva/Sclera Pinguecula Pinguecula    Cornea Clear Clear    Anterior Chamber Deep and quiet Deep and quiet    Iris Round and reactive Round and reactive    Lens 1+ Nuclear sclerosis 1+ Nuclear sclerosis    Anterior Vitreous Normal Normal              Fundus Exam         Right Left    Disc Normal Normal    C/D Ratio 0.2 0.15    Macula Normal Normal    Vessels Normal Normal    Periphery No Diabetic Retinopathy No Diabetic Retinopathy                CTA multiphase  - Subtle irregularity P2 segment of the left PCA (series 5, image 455). The anterior, middle, and right posterior cerebral arteries are within. No acute intracranial finding.     MRI Head Orbit  - Orbits/Optic Nerves:  Minimal thickening and increased T2 signal with probable minimal enhancement of the left optic nerve could represent mild optic neuritis.  No significant abnormality on the right. Globes appear within normal limits. There is abnormal soft tissue lesion in the posterior left orbital apex and optic canal, which is adjacent to or involving the inferior rectus and medial rectus extra-ocular muscles.  There is mild associated enhancement.  This could represent metastatic disease. This abuts and may compress the posterior optic nerve at the posterior left orbital apex.  =======================================    Assessment/Plan:   #Decreased vision left eye  - patient notes decreased vision  left eye for past several months, today felt sharper decrease in vision prompting him to come in  - BCVA 20/25 // HM. Was noted as CF 1-2 months prior. IOP good 17 OU. Left pupil 3mm fixed APD present. EOM limited in all gazes OS.   - Anterior exam wnl, significant for cataract  - DFE exam wnl, macula flat attached, sharp pink disc. No retinal tears, holes, or attachments seen, no posterior pathology seen  - Patient had normal ophthalmology exam few months prior with outside ophthalmologist and was recommended to get imaging but patient did not get it. Prior HVF at that time 2 months prior showed significant depression left eye with sparing superonasal quadrant   - Recommend mri brain/orbit given metastatic history and possible mass posterior to orbit  - Possible ischemic event need to rule out, so recommend full stroke work up if not completed due to vasculopath risk factors (ie cancer, DM, HTN).   - Recommend carotid duplex by US, echo, EKG. CTA already done.   - Recommend ESR, CRP, platelets, however low suspicion of GCA given patient age, denies jaw claudication, temporal pain, proximal weakness, systemic signs of inflammation ie cachexia/fever.   - If patient admitted will follow up tomorrow inpatient. Otherwise scheduled outpatient clinic to see patient tomorrow for HVF and OCT RNFL, repeat exam, image review.   - 3/21/24: Patient admitted for metastatic work up. MRI head orbits showed abnormal soft tissue lesion in posterior left orbital apex/optic canal adjacent to or involving inferior and medial rectus extra-ocular muscles with associated enhancement, possible metastatic disease. Abuts and compresses posterior optic nerve at posterior left orbital apex. Seen with Dr. Cr today in plastics clinic, exam consistent with day prior. Anna measured at 131 20//23. Color plates full OD, TEO OS. Neurosurgery consulted, no acute intervention at this time, recommend radiation and consult rad onc. No opthalmologic  intervention at this time as well, if biopsy is deemed necessary please let ophtho aware. Otherwise will follow along while in patient.        If there are further questions, please page the on call ophthalmology resident.    Purnima Mccall MD  PGY-2, Ophthalmology  03/21/2024  9:46 PM

## 2024-03-21 NOTE — PLAN OF CARE
Nurses Note -- 4 Eyes      3/21/2024   6:29 AM      Skin assessed during: Admit      [x] No Altered Skin Integrity Present    []Prevention Measures Documented      [] Yes- Altered Skin Integrity Present or Discovered   [] LDA Added if Not in Epic (Describe Wound)   [] New Altered Skin Integrity was Present on Admit and Documented in LDA   [] Wound Image Taken    Wound Care Consulted? No    Attending Nurse:  Tess Mayen RN/Staff Member:   KANDI Bowman

## 2024-03-21 NOTE — ASSESSMENT & PLAN NOTE
"Patient presents to [facility] [place] with complaint of acute worsening of vision loss in L eye. Reported 1st episode 2 month prior with worsening vision in the left eye, Recalls episodes of light sensitivity in this eye prior to vision decline. Seen by ophthalmology with normal examination. MRI brain/orbit demonstrated "abnormal soft tissue lesion in the posterior left orbital apex/optic canal which is adjacent to or involving the inferior rectus and medial rectus extra-ocular muscles with associated enhancement, and could represent metastatic disease..." NSGY/optho consulted and given concern for lesion encased in optic nerve, deferring to radiation onc for focused RT    - Rad/onc consulted  - NSGY following  - Cleared for diet  - Dex 4 mg Q6  - neuro checks    "

## 2024-03-21 NOTE — NURSING
AAOX4, verbal and able to make needs known. Admitted from ED with loss of vision to left eye. Dx breast cancer with mets to spine and pelvic. Left eyes 5mm and fixed. Skin intact. Severe weakness noted to BLE. Up x2 assist. Afebrile. C/o pain 6/10 to lower back. Lidocaine patches administered to lower back. MRI of brain showed new growth, neuro consulted. 20G to LUE, Mag infusing. NPO. Wife at bed side. Bed in lowest position, side rails up x2, call light in reach.

## 2024-03-21 NOTE — ASSESSMENT & PLAN NOTE
Patient w/ prior hx of DM, now w/ recent A1c 5.6 11/23    - -180 IP  - LDSSI PRN for hyperglycemia, hold if hypoglycemic

## 2024-03-21 NOTE — ASSESSMENT & PLAN NOTE
"[place] [facility] [place] of acute worsening of vision loss in L eye. Reported 1st episode 2 month prior with worsening vision in the left eye, Recalls episodes of light sensitivity in this eye prior to vision decline. Seen by ophthalmology with normal examination. MRI brain/orbit demonstrated "abnormal soft tissue lesion in the posterior left orbital apex/optic canal which is adjacent to or involving the inferior rectus and medial rectus extra-ocular muscles with associated enhancement, and could represent metastatic disease.  This abuts and compresses the posterior optic nerve at the posterior left orbital apex.  2. There is minimal thickening and increased T2 signal with probable minimal enhancement of the anterior left optic nerve that could represent mild optic neuritis."     Plan   - admission to oncology for further evaluation  - NSGY consultation and optho following   - NPO for further evaluation in AM  - Dex 4 mg Q12  - neuro checks  - defer further work up for CVA and GCA given findings  -     "

## 2024-03-21 NOTE — PLAN OF CARE
Bed alarm refused , education provided. Family at bedside.  Bed low and locked , call light in reach. Instructed to call for assistance , voiced understanding.

## 2024-03-21 NOTE — ED PROVIDER NOTES
Encounter Date: 3/20/2024       History     Chief Complaint   Patient presents with    Loss of Vision     Blurred vision x weeks and then today left eye loss of sight since 0830; drove straight home from [place] to get here. Metastatic CA on chemo. Pt of Dr Linn. Was scheduled to have MRI but then had loss of vision.     48-year-old male with history of metastatic breast cancer, hypertension, DM, seizure disorder presents to the emergency department for vision loss in the left eye that began this morning at around 8:30 a.m. when he awoke from sleep.  He states that he has been having issues with blurred vision in the left eye for the last several weeks now, but that his vision significantly changed upon waking today.  He has occasional pressure behind the eye but does not have any pain in the eye itself.  He denies any recent trauma or injury.  He has occasional headaches, but does not have 1 now.  No difficulty walking or talking.  He denies other worsening or alleviating factors.      Review of patient's allergies indicates:  No Known Allergies  Past Medical History:   Diagnosis Date    Breast cancer     Cancer     R breast    Diabetes mellitus     HTN (hypertension)     Leg edema, right     Prediabetes     Seizures     at age 18 - stress related    Therapy     marital counseling     Past Surgical History:   Procedure Laterality Date    AXILLARY NODE DISSECTION Right 11/22/2019    Procedure: LYMPHADENECTOMY, AXILLARY RIGHT;  Surgeon: Shima Whyte MD;  Location: [facility];  Service: General;  Laterality: Right;    AXILLARY NODE DISSECTION Right 12/17/2019    Procedure: LYMPHADENECTOMY, AXILLARY;  Surgeon: Shima Whyte MD;  Location: [address];  Service: General;  Laterality: Right;    BREAST BIOPSY      EXCISION OF HYDROCELE Right 10/28/2022    Procedure: HYDROCELECTOMY;  Surgeon: Anthony Cordero Jr., MD;  Location: [facility] OR [address] [place] [address];  Service: Urology;  Laterality: Right;  1 hour    INSERTION OF TUNNELED  CENTRAL VENOUS CATHETER (CVC) WITH SUBCUTANEOUS PORT Left 2019    Procedure: [id number];  Surgeon: Shima Whyte MD;  Location: [facility] OR 2ND FLR;  Service: General;  Laterality: Left;    INSERTION OF TUNNELED CENTRAL VENOUS CATHETER (CVC) WITH SUBCUTANEOUS PORT Left 2021    Procedure: INSERTION, SINGLE LUMEN CATHETER WITH PORT, WITH FLUOROSCOPIC GUIDANCE, right;  Surgeon: Gloria Holliday MD;  Location: [facility] OR 2ND FLR;  Service: General;  Laterality: Left;  rapid covid test    SENTINEL LYMPH NODE BIOPSY Right 2019    Procedure: BIOPSY, LYMPH NODE, SENTINEL RIGHT;  Surgeon: Shima Whyte MD;  Location: [facility];  Service: General;  Laterality: Right;    SIMPLE MASTECTOMY Right 2019    Procedure: MASTECTOMY, SIMPLE RIGHT (CONSENT AM OF) 2.5 hr case;  Surgeon: Shima Whyte MD;  Location: [facility];  Service: General;  Laterality: Right;     Family History   Problem Relation Age of Onset    Hypertension Mother     Diabetes Mother     Hypertension Father     Lupus Father     Post-traumatic stress disorder Brother     No Known Problems Maternal Grandmother     Colon cancer Maternal Grandfather     Breast cancer Paternal Grandmother     No Known Problems Paternal Grandfather     No Known Problems Sister     No Known Problems Maternal Aunt     No Known Problems Maternal Uncle     Fibromyalgia Paternal Aunt     Lupus Paternal Aunt     No Known Problems Paternal Uncle     No Known Problems Brother     No Known Problems Sister     No Known Problems Son     No Known Problems Son     No Known Problems Son     No Known Problems Son      Social History     Tobacco Use    Smoking status: Former     Current packs/day: 0.00     Average packs/day: 0.3 packs/day for 15.0 years (3.8 ttl pk-yrs)     Types: Cigarettes, Vaping with nicotine     Start date: 1999     Quit date: 2014     Years since quittin.3    Smokeless tobacco: Never    Tobacco comments:     Social smoker with alcohol     Substance Use Topics    Alcohol use: Yes     Alcohol/week: 0.0 standard drinks of alcohol     Comment: Rarely    Drug use: Not Currently     Types: Marijuana     Review of Systems   Eyes:  Positive for visual disturbance.       Physical Exam     Initial Vitals [03/20/24 1850]   BP Pulse Resp Temp SpO2   138/63 74 20 98.3 °F (36.8 °C) 100 %      MAP       --         Physical Exam    Vitals reviewed.  Constitutional: He appears well-developed and well-nourished. He is not diaphoretic. No distress.   HENT:   Head: Normocephalic and atraumatic.   Mouth/Throat: Oropharynx is clear and moist.   Eyes: Conjunctivae are normal.   Left eye is fixed.  Left pupil is fixed.  Left eye is proptotic.  He is able to see a shadow when my hand is moving in his peripheral vision.  Left eye pressure 18 mmHg.  Conjunctiva is normal.   Neck: Neck supple.   Normal range of motion.  Cardiovascular:  Normal rate, regular rhythm, normal heart sounds and intact distal pulses.     Exam reveals no gallop and no friction rub.       No murmur heard.  Pulmonary/Chest: Breath sounds normal. He has no wheezes. He has no rhonchi. He has no rales.   Abdominal: Abdomen is soft. Bowel sounds are normal. There is no abdominal tenderness.   Musculoskeletal:         General: Normal range of motion.      Cervical back: Normal range of motion and neck supple.     Neurological: He is alert and oriented to person, place, and time. He has normal strength. GCS score is 15. GCS eye subscore is 4. GCS verbal subscore is 5. GCS motor subscore is 6.   Skin: Skin is warm and dry. Capillary refill takes less than 2 seconds.   Psychiatric: He has a normal mood and affect. His behavior is normal. Judgment and thought content normal.         ED Course   Procedures  Labs Reviewed   CBC W/ AUTO DIFFERENTIAL - Abnormal; Notable for the following components:       Result Value    RBC 3.77 (*)     Hemoglobin 8.5 (*)     Hematocrit 28.8 (*)     MCV 76 (*)     MCH 22.5 (*)      MCHC 29.5 (*)     RDW 19.6 (*)     All other components within normal limits   COMPREHENSIVE METABOLIC PANEL - Abnormal; Notable for the following components:    Sodium 135 (*)     Albumin 3.3 (*)     All other components within normal limits   ISTAT PROCEDURE - Abnormal; Notable for the following components:    POC PTWBT 14.7 (*)     All other components within normal limits   PROTIME-INR   TSH   LIPID PANEL   POCT GLUCOSE, HAND-HELD DEVICE   ISTAT CREATININE   POCT GLUCOSE          Imaging Results              CTA STROKE MULTI-PHASE (Final result)  Result time 03/20/24 20:16:11      Final result by Jose Armando Galicia MD (03/20/24 20:16:11)                   Impression:      No acute intracranial finding.    Irregularity of the intracranial vertebral arteries and left PCA as above, likely atherosclerotic disease.  No high-grade stenosis or large vessel occlusion.    Osseous metastatic disease similar to prior outside MRI.    Electronically signed by resident: Jaquan Gonsalves  Date:    03/20/2024  Time:    19:31    Electronically signed by: Jose Armando Galicia MD  Date:    03/20/2024  Time:    20:16               Narrative:    EXAMINATION:  CTA STROKE MULTI-PHASE    CLINICAL HISTORY:  Neuro deficit, acute, stroke suspected;    TECHNIQUE:  Low-dose axial CT were obtained throughout the region of the head without the use of intravenous contrast.  CT angiogram was performed from through the intracranial vasculature during the IV bolus administration of 100mL of Omnipaque 350.  Multiplanar MPR and MIP reformats were performed.    CT source data was analyzed using artificial intelligence software for detection of a large vessel occlusion (LVO) or hemorrhage in order to enable computer assisted triage notification and aid clinical stroke decision making.    COMPARISON:  CT head 01/09/2023    Outside MRI spine 12/14/2023    FINDINGS:  Ventricles stable in size without evidence of hydrocephalus.    The brain parenchyma appears  unchanged..  No parenchymal mass, hemorrhage, edema or major vascular distribution infarct.    No extra-axial blood or fluid collections.    No acute displaced calvarial fracture.  Mucous retention cyst in the right maxillary antrum and patchy mucosal thickening elsewhere in the paranasal sinuses.  Mastoid air cells clear.      CTA:    Common origin of the brachiocephalic and left common carotid arteries.  Left vertebral artery arises directly from the aortic arch.    The common and internal carotid arteries are normal in course and caliber.  No calcified plaque or stenosis at the bilateral carotid bifurcations.    The vertebral origins are patent. The cervical vertebral arteries are normal in course and caliber.  Right vertebral artery is dominant.  Irregularity of the V4 segment of the right vertebral artery (series 604, image 130).  Mild multifocal irregularity of the V4 segment of the left vertebral artery vertebrobasilar system is within normal limits without focal abnormality.    Subtle irregularity P2 segment of the left PCA (series 5, image 455).  The anterior, middle, and right posterior cerebral arteries are within normal limits, without evidence of significant stenosis, focal occlusion, or intracranial aneurysm formation.    Dural venous sinuses are patent.    Miscellaneous: Partially visualized left chest Port-A-Cath inspissated secretions in the right mainstem bronchus.  Lung apices clear without consolidation.  Thyroid, submandibular, and parotid glands unremarkable.  Sclerosis of the T5 vertebral body and irregularity/sclerosis of the T3 vertebral body and posterior right 3rd rib.  Osseous findings similar to outside MRI and in keeping with metastatic disease                                       Medications   fluorescein ophthalmic strip 1 each (1 each Left Eye Given 3/20/24 1930)   proparacaine 0.5 % ophthalmic solution 2 drop (2 drops Left Eye Given 3/20/24 1930)   iohexoL (OMNIPAQUE 350) injection  100 mL (100 mLs Intravenous Given 3/20/24 1929)   morphine injection 4 mg (4 mg Intravenous Given 3/20/24 2115)     Medical Decision Making  Emergent evaluation of a 48 y.o. male presenting to the emergency department complaining of loss of vision in the left eye at 8:30 a.m. this morning. Patient is afebrile, hemodynamically stable, and non toxic appearing.   Stroke code initiated in triage.  Will order stroke workup.    Differential diagnosis includes but isn't limited to stroke, brain mass, brain metastasis, glaucoma, retrobulbar hematoma.    Patient reports blurred vision in the left eye that has been ongoing for several weeks now.  He awoke this morning at 8:30 a.m. and had complete loss of vision in his left eye.  He denies any trauma or injury.  He has no other focal deficits.  His eye and pupil are fixed on the left.  His left eye is proptotic.  He can see shadows when I move my hand in his peripheral vision.  The eye is noninjected.  The pressure is normal.  Vascular Neurology and ophthalmology have been consulted.    H/H of 8.5/28.8.  This is near patient's baseline.  No severe metabolic derangements.  Coags, LDL, TSH are within normal limits.  CTA without acute intracranial abnormality.  Irregularity of the intracranial vertebral arteries and left PCA as above, likely atherosclerotic disease.  No large vessel occlusion.  Osseous metastatic disease similar to prior outside MRI.  I have ordered MRI brain and MRI orbits.   Awaiting final recommendations from consultants.  Anticipate admission.  Due to shift change, will sign out to oncoming night team who will continue to monitor until final disposition is reached.  Patient agreeable.  All questions answered.  The patient's history, physical exam, and plan of care was discussed with and agreed upon with my supervising physician.     Amount and/or Complexity of Data Reviewed  Labs: ordered.  Radiology: ordered.    Risk  Prescription drug management.                                       Clinical Impression:  Final diagnoses:  [R29.818] Acute focal neurological deficit  [H54.7] Vision loss (Primary)                 Domi Marrero PA-C  03/20/24 7985

## 2024-03-21 NOTE — SUBJECTIVE & OBJECTIVE
Facility-Administered Medications Prior to Admission   Medication Dose Route Frequency Provider Last Rate Last Admin    [DISCONTINUED] phenylephrine HCL 2.5% ophthalmic solution 1 drop  1 drop Both Eyes 1 time in Clinic/Lorraine Robert MD        [DISCONTINUED] proparacaine 0.5 % ophthalmic solution 1 drop  1 drop Both Eyes 1 time in Clinic/Lorraine Robert MD        [DISCONTINUED] tropicamide 1% ophthalmic solution 1 drop  1 drop Both Eyes 1 time in Clinic/Lorraine Robert MD         Medications Prior to Admission   Medication Sig Dispense Refill Last Dose    ALPRAZolam (XANAX) 0.5 MG tablet Take 1 tablet (0.5 mg total) by mouth 3 (three) times daily as needed for Insomnia or Anxiety. 60 tablet 0     amLODIPine (NORVASC) 10 MG tablet TAKE 1 TABLET (10 MG) BY MOUTH EVERY DAY 90 tablet 3     bisacodyL (DULCOLAX) 5 mg EC tablet Take 1 tablet (5 mg total) by mouth daily as needed for Constipation. 30 tablet 1     calcium-vitamin D3 (OYSTER SHELL CALCIUM-VIT D3) 500 mg-5 mcg (200 unit) per tablet Take 1 tablet by mouth 2 (two) times daily. 180 tablet 3     diclofenac sodium (VOLTAREN) 1 % Gel Apply 2 g topically 4 (four) times daily. Apply to the low back for inflammation control. 100 g 0     diphenoxylate-atropine 2.5-0.025 mg (LOMOTIL) 2.5-0.025 mg per tablet Take 1 tablet by mouth 4 (four) times daily as needed for Diarrhea. 60 tablet 2     dulaglutide (TRULICITY) 1.5 mg/0.5 mL pen injector Inject 1.5 mg into the skin once a week. 4 pen 5     ferrous sulfate (IRON) 325 mg (65 mg iron) Tab tablet Take 1 tablet (325 mg total) by mouth once daily. 90 tablet 1     FLUoxetine 40 MG capsule Take 2 capsules (80 mg total) by mouth once daily. 60 capsule 0     gabapentin (NEURONTIN) 300 MG capsule Take 3 capsules (900 mg total) by mouth 3 (three) times daily. 270 capsule 2     hydroCHLOROthiazide (HYDRODIURIL) 25 MG tablet TAKE 1 TABLET BY MOUTH ONCE DAILY 90 tablet 3     HYDROcodone-acetaminophen  "(NORCO)  mg per tablet Take 1 tablet by mouth every 8 (eight) hours as needed for Pain. 90 tablet 0     ibuprofen (ADVIL,MOTRIN) 600 MG tablet Take 1 tablet (600 mg total) by mouth every 8 (eight) hours as needed for Pain. 20 tablet 0     insulin lispro (HUMALOG KWIKPEN INSULIN) 100 unit/mL pen Inject 5 Units into the skin 3 (three) times daily. 6 mL 11     lancets 33 gauge Misc 1 lancet by Misc.(Non-Drug; Combo Route) route once daily. 100 each 3     lancing device Misc 1 Device by Misc.(Non-Drug; Combo Route) route 2 (two) times daily with meals. 1 each 0     LEVEMIR FLEXPEN 100 unit/mL (3 mL) InPn pen INJECT 21 UNITS INTO THE SKIN EVERY EVENING. 15 each 2     LIDOcaine (LIDODERM) 5 % Place 1 patch onto the skin once daily. Remove & Discard patch within 12 hours or as directed by MD 7 patch 0     LIDOcaine (LIDODERM) 5 % Place 1 patch onto the skin once daily. Remove & Discard patch within 12 hours or as directed by MD 7 patch 0     loratadine (CLARITIN) 10 mg tablet Take 1 tablet (10 mg total) by mouth once daily. 30 tablet 3     losartan (COZAAR) 50 MG tablet Take 2 tablets by mouth once daily 180 tablet 3     metFORMIN (GLUCOPHAGE) 500 MG tablet Take 2 tablets (1,000 mg total) by mouth 2 (two) times daily with meals. Needs appointment for future refills 120 tablet 2     morphine (MS CONTIN) 30 MG 12 hr tablet Take 30 mg by mouth 2 (two) times daily.       OLANZapine (ZYPREXA) 5 MG tablet Take 1 tablet (5 mg total) by mouth every evening. days 1-4 of each chemotherapy cycle. 30 tablet 0     ondansetron (ZOFRAN-ODT) 8 MG TbDL Dissolve 1 tablet (8 mg total) by mouth or dissolve on tongue every 8 (eight) hours as needed (nausea/vomiting).  Let saliva dissolve tablet. 60 tablet 5     pen needle, diabetic (BD ULTRA-FINE TANESHA PEN NEEDLE) 32 gauge x 5/32" Ndle Use as directed with novolog and levemir (4 times daily) 100 each 5     ribociclib (KISQALI) 600 mg/day (200 mg x 3) Tab Take 600 mg by mouth once daily. " 63 tablet 11     tadalafiL (CIALIS) 5 MG tablet Take 2 tablets (10 mg total) by mouth daily as needed for Erectile Dysfunction. 20 tablet 1     traZODone (DESYREL) 100 MG tablet Take 1 tablet (100 mg total) by mouth every evening. 30 tablet 2        Review of patient's allergies indicates:  No Known Allergies    Past Medical History:   Diagnosis Date    Breast cancer     Cancer     R breast    Diabetes mellitus     HTN (hypertension)     Leg edema, right     Prediabetes     Seizures     at age 18 - stress related    Therapy     marital counseling     Past Surgical History:   Procedure Laterality Date    AXILLARY NODE DISSECTION Right 11/22/2019    Procedure: LYMPHADENECTOMY, AXILLARY RIGHT;  Surgeon: Shima Whyte MD;  Location: [facility];  Service: General;  Laterality: Right;    AXILLARY NODE DISSECTION Right 12/17/2019    Procedure: LYMPHADENECTOMY, AXILLARY;  Surgeon: Shima Whyte MD;  Location: [facility] OR [address] [place] [address];  Service: General;  Laterality: Right;    BREAST BIOPSY      EXCISION OF HYDROCELE Right 10/28/2022    Procedure: HYDROCELECTOMY;  Surgeon: Anthony Cordero Jr., MD;  Location: [facility] OR [address] [place] [address];  Service: Urology;  Laterality: Right;  1 hour    INSERTION OF TUNNELED CENTRAL VENOUS CATHETER (CVC) WITH SUBCUTANEOUS PORT Left 5/24/2019    Procedure: [id number];  Surgeon: Shima Whyte MD;  Location: [facility] OR [address] [place] [address];  Service: General;  Laterality: Left;    INSERTION OF TUNNELED CENTRAL VENOUS CATHETER (CVC) WITH SUBCUTANEOUS PORT Left 9/17/2021    Procedure: INSERTION, SINGLE LUMEN CATHETER WITH PORT, WITH FLUOROSCOPIC GUIDANCE, right;  Surgeon: Gloria Holliday MD;  Location: [facility] OR [address] [place] [address];  Service: General;  Laterality: Left;  rapid covid test    SENTINEL LYMPH NODE BIOPSY Right 11/22/2019    Procedure: BIOPSY, LYMPH NODE, SENTINEL RIGHT;  Surgeon: Shima Whyte MD;  Location: [facility] OR;  Service: General;  Laterality: Right;    SIMPLE MASTECTOMY Right 11/22/2019    Procedure: MASTECTOMY,  SIMPLE RIGHT (CONSENT AM OF) 2.5 hr case;  Surgeon: Shima Whyte MD;  Location: [facility];  Service: General;  Laterality: Right;     Family History       Problem Relation (Age of Onset)    Breast cancer Paternal Grandmother    Colon cancer Maternal Grandfather    Diabetes Mother    Fibromyalgia Paternal Aunt    Hypertension Mother, Father    Lupus Father, Paternal Aunt    No Known Problems Maternal Grandmother, Paternal Grandfather, Sister, Maternal Aunt, Maternal Uncle, Paternal Uncle, Brother, Sister, Son, Son, Son, Son    Post-traumatic stress disorder Brother          Tobacco Use    Smoking status: Former     Current packs/day: 0.00     Average packs/day: 0.3 packs/day for 15.0 years (3.8 ttl pk-yrs)     Types: Cigarettes, Vaping with nicotine     Start date: 1999     Quit date: 2014     Years since quittin.3    Smokeless tobacco: Never    Tobacco comments:     Social smoker with alcohol    Substance and Sexual Activity    Alcohol use: Yes     Alcohol/week: 0.0 standard drinks of alcohol     Comment: Rarely    Drug use: Not Currently     Types: Marijuana    Sexual activity: Yes     Partners: Female     Birth control/protection: None     Review of Systems  Objective:     Weight: (!) 141.5 kg (311 lb 15.9 oz)  Body mass index is 41.16 kg/m².  Vital Signs (Most Recent):  Temp: 98.6 °F (37 °C) (24 0759)  Pulse: 64 (24 1108)  Resp: 19 (24 1141)  BP: (!) 123/58 (24 0759)  SpO2: 99 % (24 0759) Vital Signs (24h Range):  Temp:  [98.3 °F (36.8 °C)-98.6 °F (37 °C)] 98.6 °F (37 °C)  Pulse:  [60-74] 64  Resp:  [16-20] 19  SpO2:  [98 %-100 %] 99 %  BP: (123-138)/(58-63) 123/58                                 Physical Exam           Neurosurgery Physical Exam    General: well developed, well nourished, no distress.   HEENT: normocephalic, atraumatic  CV: regular rate   Pulmonary: normal respirations, no signs of respiratory distress  Abdomen: soft, non-distended, not tender to  palpation  Skin: Skin is warm, dry and intact  Heme: no bruising    Neuro:   Mental Status: AO x4, no aphasia, no dysarthria   CN: Left PERRL, EOMI, VF intact to confrontation. R pupil is fixed and dilated. Decreased visual acuity. Cannot abduct/adduct. Minimal superior/inferior movement  sensation intact bilaterally, eyebrow raise and grimace symmetric, tongue midline  Motor: moves all extremities spontaneously, full strength throughout, no pronator drift   Sensory: intact to light touch throughout  Cerebellar: finger to nose intact bilaterally  Reflexes: -Munoz's, -Babinski, no clonus. Patellar: 2+ bilaterally       Significant Labs:  Recent Labs   Lab 03/20/24 1927 03/21/24  0353   GLU 84 84   * 131*   K 4.1 4.0    101   CO2 23 21*   BUN 16 12   CREATININE 1.1 0.9   CALCIUM 9.7 8.9   MG  --  1.5*     Recent Labs   Lab 03/20/24 1927 03/21/24  0353   WBC 4.88 3.86*   HGB 8.5* 7.6*   HCT 28.8* 24.4*    336     Recent Labs   Lab 03/20/24 1919 03/20/24 1927   INR 1.2 1.1     Microbiology Results (last 7 days)       ** No results found for the last 168 hours. **          All pertinent labs from the last 24 hours have been reviewed.    Significant Diagnostics:  CT: No results found in the last 24 hours.  MRI: No results found in the last 24 hours.  I have reviewed all pertinent imaging results/findings within the past 24 hours.

## 2024-03-21 NOTE — TELEPHONE ENCOUNTER
----- Message from Michael Rogers MD sent at 3/21/2024 12:56 PM CDT -----  Regarding: FU in 3 months with MRI w wo orbit

## 2024-03-21 NOTE — HPI
"49 yo M with metastatic triple negative breast cancer with BRCA1 mutation (on sacituzumab-govetican Q3w cycle 4, last 2/12), T2DM, HTN, presenting with acute on chronic vision loss. Patient evaluated at bedside virtually. Patient reports vision loss was acute in L eye this AM after waking up. Patient reports vision was normal in R eye which was improved but noticed no vision in L eye. Patient wife also reported change in eye movement and described as "lazy". No associated pain with vision loss or eye movement. Patient reports retro orbital pain that radiates to posterior. Patient also reports pain with pressure on L side. Patient reports new onset nausea/ vomiting associated with HA. Patient reports taking hydrocodone and morphine with no relief. Patient denies any recent infectious symptoms including fever chills, eye discharge, cough, SOB or GI symptoms. Patient denies any urinary symptoms.     In the ED patient was afebrile, normotensive and normocardic with SpO2 >95 on RA. Labs were significant for WBC 4.8, hgb 8.5 at baseline, , Na 135, K 4.1, CO2 23, BUN 16 and Cr 1.1. TSH wnl and Coag wnl. Stroke code was activated and CTA demonstrated "Irregularity of the intracranial vertebral arteries and left PCA as above, likely atherosclerotic disease.  No high-grade stenosis or large vessel occlusion. Osseous metastatic disease similar to prior outside MRI." Opthalmology was consulted who recommended MRI which demonstrated " There is abnormal soft tissue lesion in the posterior left orbital apex/optic canal which is adjacent to or involving the inferior rectus and medial rectus extra-ocular muscles with associated enhancement, and could represent metastatic disease.  This abuts and compresses the posterior optic nerve at the posterior left orbital apex. There is minimal thickening and increased T2 signal with probable minimal enhancement of the anterior left optic nerve that could represent mild optic neuritis." " Patient admitted to medical oncology for further management of acute vision loss.       Oncology History  Malignant neoplasm involving both nipple and areola of right breast in male, estrogen receptor negative  stage IB, now metastatic  Metastatic Triple Negative Breast Cancer: progressed from prior Stage IB   5/2/2019: Right breast retroareolar core biopsy  S/p Right breast mastectomy with axillary lymph node dissection in 2019  S/p neoadjuvant ddAC/carbo-taxol, adjuvant xeloda, XRT   Recurrence with bone metastasis in 8/2021, received abraxane and progressed on it in 3/2023  Started on Trodelvy in 5/2023 - ongoing  - His schedule of trodelvy is [id number] with growth factor support due to   neutropenia   Liquid biopsy: PIK3CA mutation  PDL 1 <1%  Precerted for Sacituzumab govitecan-iy-for a transfer of facility  - PET CT 10/10/23:               - reviewed with previous images  - upon our review, exam is stable except subcarinal LN increase in size (max SUV of 10.9 measuring 2.4 x 2.7 cm, previously was 1.5cm with max SUV 10)  - MRI C spine from 12/15/23 reviewed, shows degenerative changes but no concerning metastatic lesions. Reports for lumbar spine and thoracic spine not uploaded, have requested from [facility]. Reviewed images, show known T6 lesion (seen on PET scan Oct 2023) but no other clear lesions.

## 2024-03-21 NOTE — SUBJECTIVE & OBJECTIVE
Oncology Treatment Plan:   OP BREAST FULVESTRANT    Medications:  Continuous Infusions:  Scheduled Meds:   amLODIPine  10 mg Oral Daily    cetirizine  10 mg Oral Daily    dexAMETHasone  4 mg Intravenous Q6H    enoxparin  40 mg Subcutaneous Q12H    ferrous sulfate  1 tablet Oral Daily    FLUoxetine  80 mg Oral Daily    gabapentin  900 mg Oral TID    hydroCHLOROthiazide  25 mg Oral Daily    LIDOcaine  2 patch Transdermal Q24H    losartan  100 mg Oral Daily    morphine  30 mg Oral Q12H    pantoprazole  40 mg Oral Daily    traZODone  100 mg Oral QHS     PRN Meds:dextrose 10%, dextrose 10%, glucagon (human recombinant), glucose, glucose, HYDROcodone-acetaminophen, HYDROmorphone, insulin aspart U-100, methocarbamoL, naloxone, ondansetron, sodium chloride 0.9%     Review of patient's allergies indicates:  No Known Allergies     Past Medical History:   Diagnosis Date    Breast cancer     Cancer     R breast    Diabetes mellitus     HTN (hypertension)     Leg edema, right     Prediabetes     Seizures     at age 18 - stress related    Therapy     marital counseling     Past Surgical History:   Procedure Laterality Date    AXILLARY NODE DISSECTION Right 11/22/2019    Procedure: LYMPHADENECTOMY, AXILLARY RIGHT;  Surgeon: Shima Whyte MD;  Location: [facility];  Service: General;  Laterality: Right;    AXILLARY NODE DISSECTION Right 12/17/2019    Procedure: LYMPHADENECTOMY, AXILLARY;  Surgeon: Shima Whyte MD;  Location: [address];  Service: General;  Laterality: Right;    BREAST BIOPSY      EXCISION OF HYDROCELE Right 10/28/2022    Procedure: HYDROCELECTOMY;  Surgeon: Anthony Cordero Jr., MD;  Location: [address];  Service: Urology;  Laterality: Right;  1 hour    INSERTION OF TUNNELED CENTRAL VENOUS CATHETER (CVC) WITH SUBCUTANEOUS PORT Left 5/24/2019    Procedure: [id number];  Surgeon: Shima Whyte MD;  Location: [address];  Service: General;  Laterality: Left;    INSERTION OF TUNNELED CENTRAL  VENOUS CATHETER (CVC) WITH SUBCUTANEOUS PORT Left 2021    Procedure: INSERTION, SINGLE LUMEN CATHETER WITH PORT, WITH FLUOROSCOPIC GUIDANCE, right;  Surgeon: Gloria Holliday MD;  Location: [address];  Service: General;  Laterality: Left;  rapid covid test    SENTINEL LYMPH NODE BIOPSY Right 2019    Procedure: BIOPSY, LYMPH NODE, SENTINEL RIGHT;  Surgeon: Shima Whyte MD;  Location: [facility];  Service: General;  Laterality: Right;    SIMPLE MASTECTOMY Right 2019    Procedure: MASTECTOMY, SIMPLE RIGHT (CONSENT AM OF) 2.5 hr case;  Surgeon: Shima Whyte MD;  Location: [facility];  Service: General;  Laterality: Right;     Family History       Problem Relation (Age of Onset)    Breast cancer Paternal Grandmother    Colon cancer Maternal Grandfather    Diabetes Mother    Fibromyalgia Paternal Aunt    Hypertension Mother, Father    Lupus Father, Paternal Aunt    No Known Problems Maternal Grandmother, Paternal Grandfather, Sister, Maternal Aunt, Maternal Uncle, Paternal Uncle, Brother, Sister, Son, Son, Son, Son    Post-traumatic stress disorder Brother          Tobacco Use    Smoking status: Former     Current packs/day: 0.00     Average packs/day: 0.3 packs/day for 15.0 years (3.8 ttl pk-yrs)     Types: Cigarettes, Vaping with nicotine     Start date: 1999     Quit date: 2014     Years since quittin.3    Smokeless tobacco: Never    Tobacco comments:     Social smoker with alcohol    Substance and Sexual Activity    Alcohol use: Yes     Alcohol/week: 0.0 standard drinks of alcohol     Comment: Rarely    Drug use: Not Currently     Types: Marijuana    Sexual activity: Yes     Partners: Female     Birth control/protection: None       Review of Systems   Constitutional:  Negative for fever.   HENT:  Negative for sore throat.    Eyes:  Positive for visual disturbance.   Respiratory:  Negative for cough, shortness of breath and wheezing.    Cardiovascular:  Negative for chest pain,  palpitations and leg swelling.   Gastrointestinal:  Positive for nausea. Negative for abdominal pain, constipation, diarrhea and vomiting.   Genitourinary:  Negative for dysuria and urgency.   Musculoskeletal:  Positive for back pain.   Skin:  Negative for rash.   Neurological:  Negative for light-headedness.   Psychiatric/Behavioral:  Negative for confusion.      Objective:     Vital Signs (Most Recent):  Temp: 98.6 °F (37 °C) (03/21/24 0759)  Pulse: 64 (03/21/24 1108)  Resp: 19 (03/21/24 1141)  BP: (!) 123/58 (03/21/24 0759)  SpO2: 99 % (03/21/24 0759) Vital Signs (24h Range):  Temp:  [98.3 °F (36.8 °C)-98.6 °F (37 °C)] 98.6 °F (37 °C)  Pulse:  [60-74] 64  Resp:  [16-20] 19  SpO2:  [98 %-100 %] 99 %  BP: (123-138)/(58-63) 123/58     Weight: (!) 141.5 kg (311 lb 15.9 oz)  Body mass index is 41.16 kg/m².  Body surface area is 2.7 meters squared.    No intake or output data in the 24 hours ending 03/21/24 1312     Physical Exam  HENT:      Head: Normocephalic.      Right Ear: External ear normal.      Left Ear: External ear normal.   Eyes:      Extraocular Movements: Extraocular movements intact.      Comments: Left monocular vision loss   Cardiovascular:      Rate and Rhythm: Normal rate and regular rhythm.      Pulses: Normal pulses.      Heart sounds: Normal heart sounds.   Pulmonary:      Effort: Pulmonary effort is normal.      Breath sounds: Normal breath sounds.   Abdominal:      General: Abdomen is flat.      Palpations: Abdomen is soft.   Musculoskeletal:         General: Normal range of motion.   Skin:     General: Skin is warm.      Capillary Refill: Capillary refill takes less than 2 seconds.   Neurological:      General: No focal deficit present.      Mental Status: He is alert and oriented to person, place, and time.          Significant Labs:   CBC:   Recent Labs   Lab 03/20/24  1927 03/21/24  0353   WBC 4.88 3.86*   HGB 8.5* 7.6*   HCT 28.8* 24.4*    336    and CMP:   Recent Labs   Lab  03/20/24 1927 03/21/24  0353   * 131*   K 4.1 4.0    101   CO2 23 21*   GLU 84 84   BUN 16 12   CREATININE 1.1 0.9   CALCIUM 9.7 8.9   PROT 8.4 6.8   ALBUMIN 3.3* 2.9*   BILITOT 0.5 0.5   ALKPHOS 133 127   AST 30 25   ALT 14 11   ANIONGAP 10 9       Diagnostic Results:  I have reviewed all pertinent imaging results/findings within the past 24 hours.

## 2024-03-21 NOTE — PROVIDER PROGRESS NOTES - EMERGENCY DEPT.
Imaging Results               MRI Head Orbits W/Wo Contrast (XPD) (Final result)  Result time 03/21/24 00:08:53   Procedure changed from MRI Orbits With Contrast     Final result by Marco De Jesus MD (03/21/24 00:08:53)                   Impression:      1. There is abnormal soft tissue lesion in the posterior left orbital apex/optic canal which is adjacent to or involving the inferior rectus and medial rectus extra-ocular muscles with associated enhancement, and could represent metastatic disease.  This abuts and compresses the posterior optic nerve at the posterior left orbital apex.  2. There is minimal thickening and increased T2 signal with probable minimal enhancement of the anterior left optic nerve that could represent mild optic neuritis.  Follow-up recommended.  3. Mild paranasal sinus disease.  4. This report was flagged in Epic as abnormal.      Electronically signed by: Marco De Jesus  Date:    03/21/2024  Time:    00:08               Narrative:    EXAMINATION:  MRI HEAD-ORBITS W/WO CONTRAST (XPD)    CLINICAL HISTORY:  Vision loss, monocular;    TECHNIQUE:  Multiplanar multisequence MR imaging of the brain as well as thin slice dedicated imaging of the orbits was performed before and after the administration of 10 mL Gadavist intravenous contrast.    COMPARISON:  CT 03/20/2024    FINDINGS:  Orbits/Optic Nerves:  Minimal thickening and increased T2 signal with probable minimal enhancement of the left optic nerve could represent mild optic neuritis.  No significant abnormality on the right.    Globes appear within normal limits.    Remainder of the Intracranial Compartment:    No midline shift or hydrocephalus.  No extra-axial blood or fluid collections.    The brain parenchyma appears normal. No cerebral mass lesion, acute hemorrhage, edema, or acute infarct.    There is abnormal soft tissue lesion in the posterior left orbital apex and optic canal, which is adjacent to or involving the inferior rectus  and medial rectus extra-ocular muscles.  There is mild associated enhancement.  This could represent metastatic disease.  Minimal associated restricted diffusion on axial 35 of series 6.    This abuts and may compress the posterior optic nerve at the posterior left orbital apex.    Normal vascular flow voids are preserved.    Mild bilateral ethmoid and right maxillary sinus disease.    Skull/Extracranial Contents (limited evaluation): Bone marrow signal intensity is normal.                                       CTA STROKE MULTI-PHASE (Final result)  Result time 03/20/24 20:16:11      Final result by Jose Armando Galicia MD (03/20/24 20:16:11)                   Impression:      No acute intracranial finding.    Irregularity of the intracranial vertebral arteries and left PCA as above, likely atherosclerotic disease.  No high-grade stenosis or large vessel occlusion.    Osseous metastatic disease similar to prior outside MRI.    Electronically signed by resident: Jaquan Gonsalves  Date:    03/20/2024  Time:    19:31    Electronically signed by: Jose Armando Galicia MD  Date:    03/20/2024  Time:    20:16               Narrative:    EXAMINATION:  CTA STROKE MULTI-PHASE    CLINICAL HISTORY:  Neuro deficit, acute, stroke suspected;    TECHNIQUE:  Low-dose axial CT were obtained throughout the region of the head without the use of intravenous contrast.  CT angiogram was performed from through the intracranial vasculature during the IV bolus administration of 100mL of Omnipaque 350.  Multiplanar MPR and MIP reformats were performed.    CT source data was analyzed using artificial intelligence software for detection of a large vessel occlusion (LVO) or hemorrhage in order to enable computer assisted triage notification and aid clinical stroke decision making.    COMPARISON:  CT head 01/09/2023    Outside MRI spine 12/14/2023    FINDINGS:  Ventricles stable in size without evidence of hydrocephalus.    The brain parenchyma appears  unchanged..  No parenchymal mass, hemorrhage, edema or major vascular distribution infarct.    No extra-axial blood or fluid collections.    No acute displaced calvarial fracture.  Mucous retention cyst in the right maxillary antrum and patchy mucosal thickening elsewhere in the paranasal sinuses.  Mastoid air cells clear.      CTA:    Common origin of the brachiocephalic and left common carotid arteries.  Left vertebral artery arises directly from the aortic arch.    The common and internal carotid arteries are normal in course and caliber.  No calcified plaque or stenosis at the bilateral carotid bifurcations.    The vertebral origins are patent. The cervical vertebral arteries are normal in course and caliber.  Right vertebral artery is dominant.  Irregularity of the V4 segment of the right vertebral artery (series 604, image 130).  Mild multifocal irregularity of the V4 segment of the left vertebral artery vertebrobasilar system is within normal limits without focal abnormality.    Subtle irregularity P2 segment of the left PCA (series 5, image 455).  The anterior, middle, and right posterior cerebral arteries are within normal limits, without evidence of significant stenosis, focal occlusion, or intracranial aneurysm formation.    Dural venous sinuses are patent.    Miscellaneous: Partially visualized left chest Port-A-Cath inspissated secretions in the right mainstem bronchus.  Lung apices clear without consolidation.  Thyroid, submandibular, and parotid glands unremarkable.  Sclerosis of the T5 vertebral body and irregularity/sclerosis of the T3 vertebral body and posterior right 3rd rib.  Osseous findings similar to outside MRI and in keeping with metastatic disease                                       48-year-old male signed out pending ophthalmology recommendations and MRI of the brain and orbits.  MRI is concerning for new likely metastatic soft tissue lesion in his optic canal compressing his optic  nerve.  He also has a mild aspect of possible optic neuritis.  Neurosurgery was consulted, will evaluate in the a.m..  Ophthalmology was updated, plan on re-evaluation in a.m..  Patient kept NPO for possible intervention.  Patient admitted to Hematology Oncology.  Home pain medications provided.

## 2024-03-21 NOTE — ASSESSMENT & PLAN NOTE
49 y/o M with PMHx of metastatic breast cancer with mets to the spine, HTN DM, seizure disorder presents to the [facility] ED for vision loss in the left eye that began this morning at around 8:30 a.m. Family drove patient from [place], [place] to come to [facility].     -NIH 2 for complete hemianopia  -CTA Stroke with no acute hemorrhage or acute LVO seen  -Recommend consult to neuro optht. Can obtain MRI Brain/orbits while awaiting for optht rec.   -Not a candidate for acute intervention at this time. No TNK as OOW. No IR as no LVO seen on CTA.  -Case discussed with stroke on-call attending     Thank you for your consult. Vascular neuro to followup on imaging if obtained. If negative, will sign off. Further workup per ED provider. Please do not hesitate to contact us for any questions or concerns.

## 2024-03-21 NOTE — HPI
"49 yo M with metastatic triple negative breast cancer with BRCA1 mutation (on sacituzumab-govetican Q3w cycle 4, last 2/12), T2DM, HTN, presenting with acute on chronic vision loss. Patient reports vision loss in his left eye that has gotten worse over the past few weeks became acutely worse yesterday morning after waking up. Patient reports vision was normal in R eye which was improved but noticed no vision in L eye. Patient wife also reported change in eye movement and described as "lazy". No associated pain with vision loss or eye movement. Patient reports retro orbital pain that radiates to posterior. Patient also reports pain with pressure on L side. Patient reports new onset nausea/ vomiting associated with HA. Patient reports taking hydrocodone and morphine with no relief. Patient denies any recent infectious symptoms including fever chills, eye discharge, cough, SOB or GI symptoms. Patient denies any urinary symptoms. MRI showed a soft tissue mass in the left orbit compressing the optic nerve     CTA 3/20: Irregularity of the intracranial vertebral arteries and left PCA as above, likely atherosclerotic disease.  No high-grade stenosis or large vessel occlusion. Osseous metastatic disease similar to prior outside MRI." Opthalmology was consulted who recommended     MRI 3/20: abnormal soft tissue lesion in the posterior left orbital apex/optic canal which is adjacent to or involving the inferior rectus and medial rectus extra-ocular muscles with associated enhancement, and could represent metastatic disease.  This abuts and compresses the posterior optic nerve at the posterior left orbital apex. There is minimal thickening and increased T2 signal with probable minimal enhancement of the anterior left optic nerve that could represent mild optic neuritis." Patient admitted to medical oncology for further management of acute vision loss.   "

## 2024-03-21 NOTE — ASSESSMENT & PLAN NOTE
"49 yo male with metastatic breast cancer presenting due to decreased vision from his left eye the last few weeks that has got acutely worse since yesterday who was found to have a soft tissue mass in the L orbit on MRI concerning for met.    CTA 3/20: Irregularity of the intracranial vertebral arteries and left PCA as above, likely atherosclerotic disease.  No high-grade stenosis or large vessel occlusion. Osseous metastatic disease similar to prior outside MRI." Opthalmology was consulted who recommended     MRI 3/20: abnormal soft tissue lesion in the posterior left orbital apex/optic canal which is adjacent to or involving the inferior rectus and medial rectus extra-ocular muscles with associated enhancement, and could represent metastatic disease.      Recommendations:  -NSGY evaluated at bedside  -Consult Rad/Onc    -Likely needs radiation  -Fu in 3 mo with MRI orbit  -No acute neurosurgical intervention at this time    Discussed with Dr. Diaz  "

## 2024-03-21 NOTE — CONSULTS
[facility] - Emergency Dept  Vascular Neurology  [facility]  Consult Note    Inpatient consult to Vascular (Stroke) Neurology  Consult performed by: Lexii Miles DNP  Consult ordered by: Domi Marrero PA-C  Reason for consult: Left eye vision loss        Assessment/Plan:     Patient is a 48 y.o. year old male with:    Vision loss of left eye  47 y/o M with PMHx of metastatic breast cancer with mets to the spine, HTN DM, seizure disorder presents to the [facility] ED for vision loss in the left eye that began this morning at around 8:30 a.m. Family drove patient from [place], [place] to come to [facility].     -NIH 2 for complete hemianopia  -CTA Stroke with no acute hemorrhage or acute LVO seen  -Recommend consult to neuro optht. Can obtain MRI Brain/orbits while awaiting for optht rec.   -Not a candidate for acute intervention at this time. No TNK as OOW. No IR as no LVO seen on CTA.  -Case discussed with stroke on-call attending     Thank you for your consult. Vascular neuro to followup on imaging if obtained. If negative, will sign off. Further workup per ED provider. Please do not hesitate to contact us for any questions or concerns.        STROKE DOCUMENTATION     Acute Stroke Times   Symptom Onset Date: 03/20/24  Symptom Onset Time: 0830  Stroke Team Called Date: 03/20/24  Stroke Team Called Time: 1853  Stroke Team Arrival Date: 03/20/24  Stroke Team Arrival Time: 1856  CT Interpretation Time: 1915  Thrombolytic Therapy Recommended: No  CTA Interpretation Time: 1918  Thrombectomy Recommended: No    NIH Scale:  Interval: baseline  1a. Level of Consciousness: 0-->Alert, keenly responsive  1b. LOC Questions: 0-->Answers both questions correctly  1c. LOC Commands: 0-->Performs both tasks correctly  2. Best Gaze: 0-->Normal  3. Visual: 2-->Complete hemianopia  4. Facial Palsy: 0-->Normal symmetrical movements  5a. Motor Arm, Left: 0-->No drift, limb holds 90 (or 45) degrees for full 10 secs  5b.  "Motor Arm, Right: 0-->No drift, limb holds 90 (or 45) degrees for full 10 secs  6a. Motor Leg, Left: 0-->No drift, leg holds 30 degree position for full 5 secs  6b. Motor Leg, Right: 0-->No drift, leg holds 30 degree position for full 5 secs  7. Limb Ataxia: 0-->Absent  8. Sensory: 0-->Normal, no sensory loss  9. Best Language: 0-->No aphasia, normal  10. Dysarthria: 0-->Normal  11. Extinction and Inattention (formerly Neglect): 0-->No abnormality  Total (NIH Stroke Scale): 2    Modified [place] Score: 0  Davion Coma Scale:15   ABCD2 Score:    [id number] Score:   HAS -BLED Score:   ICH Score:   Hunt & Edmonds Classification:       Thrombolysis Candidate? No, Out of window - Symptom onset > 4.5 hours    Delays to Thrombolysis?  Not Applicable    Interventional Revascularization Candidate?   Is the patient eligible for mechanical endovascular reperfusion (MARYBETH)?  No; No large vessel occlusion identified on imaging     Delays to Thrombectomy? Not Applicable    Hemorrhagic change of an Ischemic Stroke: Does this patient have an ischemic stroke with hemorrhagic changes? No     Subjective:     History of Present Illness:  49 y/o M with PMHx of metastatic breast cancer with mets to the spine, HTN DM, seizure disorder presents to the [facility] ED for vision loss in the left eye that began this morning at around 8:30 a.m. when he awoke from sleep. Per patient, he has been having issues with blurry vision in the left eye for the last several weeks now, which he describes as a "dirty lint". This morning, he experienced "a complete black out" in that left eye. He states they drove from [place], [place] to come to [facility]. Stroke code called on arrival to the ED.          Past Medical History:   Diagnosis Date    Breast cancer     Cancer     R breast    Diabetes mellitus     HTN (hypertension)     Leg edema, right     Prediabetes     Seizures     at age 18 - stress related    Therapy     marital counseling     Past Surgical History:   Procedure " Laterality Date    AXILLARY NODE DISSECTION Right 2019    Procedure: LYMPHADENECTOMY, AXILLARY RIGHT;  Surgeon: Shima Whyte MD;  Location: [facility];  Service: General;  Laterality: Right;    AXILLARY NODE DISSECTION Right 2019    Procedure: LYMPHADENECTOMY, AXILLARY;  Surgeon: Shima Whyte MD;  [facility];  Service: General;  Laterality: Right;    BREAST BIOPSY      EXCISION OF HYDROCELE Right 10/28/2022    Procedure: HYDROCELECTOMY;  Surgeon: Anthony Cordero Jr., MD;  [facility];  Service: Urology;  Laterality: Right;  1 hour    INSERTION OF TUNNELED CENTRAL VENOUS CATHETER (CVC) WITH SUBCUTANEOUS PORT Left 2019    Procedure: [id number]-[id number]-R-CATH;  Surgeon: Shima Whyte MD;  [facility];  Service: General;  Laterality: Left;    INSERTION OF TUNNELED CENTRAL VENOUS CATHETER (CVC) WITH SUBCUTANEOUS PORT Left 2021    Procedure: INSERTION, SINGLE LUMEN CATHETER WITH PORT, WITH FLUOROSCOPIC GUIDANCE, right;  Surgeon: Gloria Holliday MD;  [facility];  Service: General;  Laterality: Left;  rapid covid test    SENTINEL LYMPH NODE BIOPSY Right 2019    Procedure: BIOPSY, LYMPH NODE, SENTINEL RIGHT;  Surgeon: Shima Whyte MD;  Location: [facility];  Service: General;  Laterality: Right;    SIMPLE MASTECTOMY Right 2019    Procedure: MASTECTOMY, SIMPLE RIGHT (CONSENT AM OF) 2.5 hr case;  Surgeon: Shima Whyte MD;  Location: [facility];  Service: General;  Laterality: Right;     Social History     Tobacco Use    Smoking status: Former     Current packs/day: 0.00     Average packs/day: 0.3 packs/day for 15.0 years (3.8 ttl pk-yrs)     Types: Cigarettes, Vaping with nicotine     Start date: 1999     Quit date: 2014     Years since quittin.3    Smokeless tobacco: Never    Tobacco comments:     Social smoker with alcohol    Substance Use Topics    Alcohol use: Yes     Alcohol/week: 0.0 standard drinks of alcohol      Comment: Rarely    Drug use: Not Currently     Types: Marijuana     Review of patient's allergies indicates:  No Known Allergies    Medications: I have reviewed the current medication administration record.    (Not in a hospital admission)      Review of Systems   Constitutional:  Negative for chills and fever.   HENT:  Negative for trouble swallowing.    Eyes:  Positive for visual disturbance. Negative for pain.   Respiratory:  Negative for chest tightness and shortness of breath.    Cardiovascular:  Negative for chest pain and palpitations.   Gastrointestinal:  Negative for abdominal pain.   Genitourinary:  Negative for dysuria.   Musculoskeletal:  Negative for neck pain and neck stiffness.   Neurological:  Negative for dizziness, seizures, facial asymmetry, speech difficulty, weakness, light-headedness, numbness and headaches.     Objective:     Vital Signs (Most Recent):  Temp: 98.3 °F (36.8 °C) (03/20/24 1850)  Pulse: 74 (03/20/24 1850)  Resp: 20 (03/20/24 1850)  BP: 138/63 (03/20/24 1850)  SpO2: 100 % (03/20/24 1850)    Vital Signs Range (Last 24H):  Temp:  [98.3 °F (36.8 °C)]   Pulse:  [74]   Resp:  [20]   BP: (138)/(63)   SpO2:  [100 %]        Physical Exam  Constitutional:       General: He is not in acute distress.  HENT:      Head: Normocephalic.   Eyes:      Pupils: Pupils are equal, round, and reactive to light.   Cardiovascular:      Rate and Rhythm: Normal rate.   Pulmonary:      Effort: Pulmonary effort is normal. No respiratory distress.   Neurological:      Mental Status: He is alert and oriented to person, place, and time.      Cranial Nerves: Cranial nerve deficit present.      Motor: No weakness.   Psychiatric:         Mood and Affect: Mood normal.         Behavior: Behavior normal.              Neurological Exam:   LOC: alert  Attention Span: Good   Language: No aphasia  Articulation: No dysarthria  Orientation: Person, Place, Time   Visual Fields: Hemianopsia left  EOM (CN III, IV, VI):  "Full/intact  Pupils (CN II, III): PERRL  Facial Sensation (CN V): Normal  Facial Movement (CN VII): Symmetric facial expression    Motor: Arm left  Normal 5/5  Leg left  Normal 5/5  Arm right  Normal 5/5  Leg right Normal 5/5  Sensation: Intact to light touch, temperature and vibration      Laboratory:  CMP:   Recent Labs   Lab 03/20/24 1927   CALCIUM 9.7   ALBUMIN 3.3*   PROT 8.4   *   K 4.1   CO2 23      BUN 16   CREATININE 1.1   ALKPHOS 133   ALT 14   AST 30   BILITOT 0.5     CBC:   Recent Labs   Lab 03/20/24 1927   WBC 4.88   RBC 3.77*   HGB 8.5*   HCT 28.8*      MCV 76*   MCH 22.5*   MCHC 29.5*     Lipid Panel:   Recent Labs   Lab 03/20/24 1927   CHOL 164   LDLCALC 109.8   HDL 40   TRIG 71     Coagulation:   Recent Labs   Lab 03/20/24 1927   INR 1.1     Hgb A1C: No results for input(s): "HGBA1C" in the last 168 hours.  TSH:   Recent Labs   Lab 03/20/24 1927   TSH 1.365       Diagnostic Results:      Brain imaging/Vessel Imaging:  CTA Stroke MP - 3/20/2024    Impression:     No acute intracranial finding.     Irregularity of the intracranial vertebral arteries and left PCA as above, likely atherosclerotic disease.  No high-grade stenosis or large vessel occlusion.     Osseous metastatic disease similar to prior outside MRI    Cardiac Evaluation:   EKG from today - NSR, vent rate of 70bpm      Lexii Miles DNP  Comprehensive Stroke Center  Department of Vascular Neurology   [facility] - Emergency Dept   "

## 2024-03-21 NOTE — ED TRIAGE NOTES
Pt arrives to ED complaining of complete vision loss to left eye around 8-9 am this morning. Pt states he has had partial vision loss for months to that eye. Pt states they drove from [place] to come to [facility]. Hx breast cancer with mets to spine. Denies weakness, HA, dizziness, numbness, tingling. Pt AAOx4.

## 2024-03-21 NOTE — CONSULTS
[facility] - Oncology ([facility])  Radiation Oncology  Consult Note    Patient Name: Paul Li Jr.  MRN: [id number]  Admission Date: 3/20/2024  Hospital Length of Stay: 0 days  Code Status: Full Code   Attending Provider: Melony Varela MD  Consulting Provider: Pedro Corey MD  Primary Care Physician: Kareem Arroyo MD  Principal Problem:Vision loss of left eye    Inpatient consult to Radiation Oncology  Consult performed by: Pedro Corey MD  Consult ordered by: Dequan Guido MD        Assessment/Plan:     Secondary malignant neoplasm of brain  Mr. Li is a 47 y/o male with history of Stage I Right breast triple negative breast cancer diagnosed in 5/2019 s/p NAC -> Right mastectomy 11/22/19 and completion ALND 12/17/19. He received PMRT 50.4 Gy in 28 fx followed by 10 Gy in 5 fx boost completed 3/23/20 by Dr. Nair. He received additional adjuvant chemotherapy through 9/2020. He was diagnosed with metastatic disease to bone on iliac bone biopsy 7/2/21. He has been receiving systemic therapy since that time, most recently at [facility] due to relocating closer to family.     He presented to [facility] ED 3/20/24 after progressive Left-sided decreased vision x6 weeks and sudden Left visual loss earlier that morning. MRI Orbits 3/21/24 demonstrated soft tissue mass in the posterior Left orbit with compression of the optic nerve and either involvement or compression of inferior and medial rectus muscles. Radiation Oncology was consulted for consideration of treatment options.     I met with the patient and multiple family members at bedside. He gives the above history. In additional to visual loss, he has had intermittent HA and pressure behind the Left eye over the past 6 weeks. Since starting dexamethasone this morning, the HA/pressure have resolved. On exam he does not have vision in the Left eye and no extra-ocular movements. No improvement on dex.     I recommend treating the Left orbit to 30 Gy in 10  fractions with intent to restore vision. Potential side effects including short-term dermatitis, dry eye, and fatigue were reviewed. Potential long-term side effects including cataracts, chronic dry eye, and impaired short-term memory were reviewed. At the end of our discussion, he wished to proceed with the recommended treatment.     Plan:  1) CT Simulation today for radiation treatment planning. Will deliver the 1st of 10 fractions today.   2) If he is otherwise okay for discharge, he does not have to remain inpatient for the rest of his treatment.         Thank you for your consult. I will follow-up with patient. Please contact us if you have any additional questions.    Pedro Corey MD  Radiation Oncology  [facility] - Oncology ([facility])

## 2024-03-21 NOTE — HPI
"49 yo M with metastatic triple negative breast cancer with BRCA1 mutation (on sacituzumab-govetican Q3w cycle 4, last 2/12), T2DM, HTN, presenting with acute on chronic vision loss. Patient evaluated at bedside virtually. Patient reports vision loss was acute in L eye this AM after waking up. Patient reports vision was normal in R eye which was improved but noticed no vision in L eye. Patient wife also reported change in eye movement and described as "lazy". No associated pain with vision loss or eye movement. Patient reports retro orbital pain that radiates to posterior. Patient also reports pain with pressure on L side. Patient reports new onset nausea/ vomiting associated with HA. Patient reports taking hydrocodone and morphine with no relief. Patient denies any recent infectious symptoms including fever chills, eye discharge, cough, SOB or GI symptoms. Patient denies any urinary symptoms.      In the ED patient was afebrile, normotensive and normocardic with SpO2 >95 on RA. Labs were significant for WBC 4.8, hgb 8.5 at baseline, , Na 135, K 4.1, CO2 23, BUN 16 and Cr 1.1. TSH wnl and Coag wnl. Stroke code was activated and CTA demonstrated "Irregularity of the intracranial vertebral arteries and left PCA as above, likely atherosclerotic disease.  No high-grade stenosis or large vessel occlusion. Osseous metastatic disease similar to prior outside MRI." Opthalmology was consulted who recommended MRI which demonstrated " There is abnormal soft tissue lesion in the posterior left orbital apex/optic canal which is adjacent to or involving the inferior rectus and medial rectus extra-ocular muscles with associated enhancement, and could represent metastatic disease.  This abuts and compresses the posterior optic nerve at the posterior left orbital apex. There is minimal thickening and increased T2 signal with probable minimal enhancement of the anterior left optic nerve that could represent mild optic " "neuritis." Patient admitted to medical oncology for further management of acute vision loss.         Oncology History  Malignant neoplasm involving both nipple and areola of right breast in male, estrogen receptor negative  stage IB, now metastatic  Metastatic Triple Negative Breast Cancer: progressed from prior Stage IB   5/2/2019: Right breast retroareolar core biopsy  S/p Right breast mastectomy with axillary lymph node dissection in 2019  S/p neoadjuvant ddAC/carbo-taxol, adjuvant xeloda, XRT   Recurrence with bone metastasis in 8/2021, received abraxane and progressed on it in 3/2023  Started on Trodelvy in 5/2023 - ongoing  - His schedule of trodelvy is [id number] with growth factor support due to   neutropenia   Liquid biopsy: PIK3CA mutation  PDL 1 <1%  Precerted for Sacituzumab govitecan-hziy-for a transfer of facility  - PET CT 10/10/23:               - reviewed with previous images  - upon our review, exam is stable except subcarinal LN increase in size (max SUV of 10.9 measuring 2.4 x 2.7 cm, previously was 1.5cm with max SUV 10)  - MRI C spine from 12/15/23 reviewed, shows degenerative changes but no concerning metastatic lesions. Reports for lumbar spine and thoracic spine not uploaded, have requested from [facility]. Reviewed images, show known T6 lesion (seen on PET scan Oct 2023) but no other clear lesions.  "

## 2024-03-21 NOTE — ASSESSMENT & PLAN NOTE
Mr. Li is a 49 y/o male with history of Stage I Right breast triple negative breast cancer diagnosed in 5/2019 s/p NAC -> Right mastectomy 11/22/19 and completion ALND 12/17/19. He received PMRT 50.4 Gy in 28 fx followed by 10 Gy in 5 fx boost completed 3/23/20 by Dr. Nair. He received additional adjuvant chemotherapy through 9/2020. He was diagnosed with metastatic disease to bone on iliac bone biopsy 7/2/21. He has been receiving systemic therapy since that time, most recently at [facility] due to relocating closer to family.     He presented to [facility] ED 3/20/24 after progressive Left-sided decreased vision x6 weeks and sudden Left visual loss earlier that morning. MRI Orbits 3/21/24 demonstrated soft tissue mass in the posterior Left orbit with compression of the optic nerve and either involvement or compression of inferior and medial rectus muscles. Radiation Oncology was consulted for consideration of treatment options.     I met with the patient and multiple family members at bedside. He gives the above history. In additional to visual loss, he has had intermittent HA and pressure behind the Left eye over the past 6 weeks. Since starting dexamethasone this morning, the HA/pressure have resolved. On exam he does not have vision in the Left eye and no extra-ocular movements. No improvement on dex.     I recommend treating the Left orbit to 30 Gy in 10 fractions with intent to restore vision. Potential side effects including short-term dermatitis, dry eye, and fatigue were reviewed. Potential long-term side effects including cataracts, chronic dry eye, and impaired short-term memory were reviewed. At the end of our discussion, he wished to proceed with the recommended treatment.     Plan:  1) CT Simulation today for radiation treatment planning. Will deliver the 1st of 10 fractions today.   2) If he is otherwise okay for discharge, he does not have to remain inpatient for the rest of his treatment.

## 2024-03-21 NOTE — ED NOTES
Telemetry Verification   Patient placed on Telemetry Box  Verified with War Room  Box # [id number]   Rate 56   Rhythm Sinus Dominick

## 2024-03-21 NOTE — CONSULTS
"[facility] - Oncology ([facility])  Neurosurgery  Consult Note    Inpatient consult to Neurosurgery  Consult performed by: Michael Rogers MD  Consult ordered by: Maya Darnell MD        Subjective:     Chief Complaint/Reason for Admission: decrease L eye vision    History of Present Illness: 49 yo M with metastatic triple negative breast cancer with BRCA1 mutation (on sacituzumab-govetican Q3w cycle 4, last 2/12), T2DM, HTN, presenting with acute on chronic vision loss. Patient reports vision loss in his left eye that has gotten worse over the past few weeks became acutely worse yesterday morning after waking up. Patient reports vision was normal in R eye which was improved but noticed no vision in L eye. Patient wife also reported change in eye movement and described as "lazy". No associated pain with vision loss or eye movement. Patient reports retro orbital pain that radiates to posterior. Patient also reports pain with pressure on L side. Patient reports new onset nausea/ vomiting associated with HA. Patient reports taking hydrocodone and morphine with no relief. Patient denies any recent infectious symptoms including fever chills, eye discharge, cough, SOB or GI symptoms. Patient denies any urinary symptoms. MRI showed a soft tissue mass in the left orbit compressing the optic nerve     CTA 3/20: Irregularity of the intracranial vertebral arteries and left PCA as above, likely atherosclerotic disease.  No high-grade stenosis or large vessel occlusion. Osseous metastatic disease similar to prior outside MRI." Opthalmology was consulted who recommended     MRI 3/20: abnormal soft tissue lesion in the posterior left orbital apex/optic canal which is adjacent to or involving the inferior rectus and medial rectus extra-ocular muscles with associated enhancement, and could represent metastatic disease.  This abuts and compresses the posterior optic nerve at the posterior left orbital apex. There is minimal " "thickening and increased T2 signal with probable minimal enhancement of the anterior left optic nerve that could represent mild optic neuritis." Patient admitted to medical oncology for further management of acute vision loss.     Facility-Administered Medications Prior to Admission   Medication Dose Route Frequency Provider Last Rate Last Admin    [DISCONTINUED] phenylephrine HCL 2.5% ophthalmic solution 1 drop  1 drop Both Eyes 1 time in Clinic/Lorraine Robert MD        [DISCONTINUED] proparacaine 0.5 % ophthalmic solution 1 drop  1 drop Both Eyes 1 time in Clinic/Lorraine Robert MD        [DISCONTINUED] tropicamide 1% ophthalmic solution 1 drop  1 drop Both Eyes 1 time in Clinic/Lorraine Robert MD         Medications Prior to Admission   Medication Sig Dispense Refill Last Dose    ALPRAZolam (XANAX) 0.5 MG tablet Take 1 tablet (0.5 mg total) by mouth 3 (three) times daily as needed for Insomnia or Anxiety. 60 tablet 0     amLODIPine (NORVASC) 10 MG tablet TAKE 1 TABLET (10 MG) BY MOUTH EVERY DAY 90 tablet 3     bisacodyL (DULCOLAX) 5 mg EC tablet Take 1 tablet (5 mg total) by mouth daily as needed for Constipation. 30 tablet 1     calcium-vitamin D3 (OYSTER SHELL CALCIUM-VIT D3) 500 mg-5 mcg (200 unit) per tablet Take 1 tablet by mouth 2 (two) times daily. 180 tablet 3     diclofenac sodium (VOLTAREN) 1 % Gel Apply 2 g topically 4 (four) times daily. Apply to the low back for inflammation control. 100 g 0     diphenoxylate-atropine 2.5-0.025 mg (LOMOTIL) 2.5-0.025 mg per tablet Take 1 tablet by mouth 4 (four) times daily as needed for Diarrhea. 60 tablet 2     dulaglutide (TRULICITY) 1.5 mg/0.5 mL pen injector Inject 1.5 mg into the skin once a week. 4 pen 5     ferrous sulfate (IRON) 325 mg (65 mg iron) Tab tablet Take 1 tablet (325 mg total) by mouth once daily. 90 tablet 1     FLUoxetine 40 MG capsule Take 2 capsules (80 mg total) by mouth once daily. 60 capsule 0     gabapentin " (NEURONTIN) 300 MG capsule Take 3 capsules (900 mg total) by mouth 3 (three) times daily. 270 capsule 2     hydroCHLOROthiazide (HYDRODIURIL) 25 MG tablet TAKE 1 TABLET BY MOUTH ONCE DAILY 90 tablet 3     HYDROcodone-acetaminophen (NORCO)  mg per tablet Take 1 tablet by mouth every 8 (eight) hours as needed for Pain. 90 tablet 0     ibuprofen (ADVIL,MOTRIN) 600 MG tablet Take 1 tablet (600 mg total) by mouth every 8 (eight) hours as needed for Pain. 20 tablet 0     insulin lispro (HUMALOG KWIKPEN INSULIN) 100 unit/mL pen Inject 5 Units into the skin 3 (three) times daily. 6 mL 11     lancets 33 gauge Misc 1 lancet by Misc.(Non-Drug; Combo Route) route once daily. 100 each 3     lancing device Misc 1 Device by Misc.(Non-Drug; Combo Route) route 2 (two) times daily with meals. 1 each 0     LEVEMIR FLEXPEN 100 unit/mL (3 mL) InPn pen INJECT 21 UNITS INTO THE SKIN EVERY EVENING. 15 each 2     LIDOcaine (LIDODERM) 5 % Place 1 patch onto the skin once daily. Remove & Discard patch within 12 hours or as directed by MD 7 patch 0     LIDOcaine (LIDODERM) 5 % Place 1 patch onto the skin once daily. Remove & Discard patch within 12 hours or as directed by MD 7 patch 0     loratadine (CLARITIN) 10 mg tablet Take 1 tablet (10 mg total) by mouth once daily. 30 tablet 3     losartan (COZAAR) 50 MG tablet Take 2 tablets by mouth once daily 180 tablet 3     metFORMIN (GLUCOPHAGE) 500 MG tablet Take 2 tablets (1,000 mg total) by mouth 2 (two) times daily with meals. Needs appointment for future refills 120 tablet 2     morphine (MS CONTIN) 30 MG 12 hr tablet Take 30 mg by mouth 2 (two) times daily.       OLANZapine (ZYPREXA) 5 MG tablet Take 1 tablet (5 mg total) by mouth every evening. days 1-4 of each chemotherapy cycle. 30 tablet 0     ondansetron (ZOFRAN-ODT) 8 MG TbDL Dissolve 1 tablet (8 mg total) by mouth or dissolve on tongue every 8 (eight) hours as needed (nausea/vomiting).  Let saliva dissolve tablet. 60 tablet 5      "pen needle, diabetic (BD ULTRA-FINE TANESHA PEN NEEDLE) 32 gauge x 5/32" Ndle Use as directed with novolog and levemir (4 times daily) 100 each 5     ribociclib (KISQALI) 600 mg/day (200 mg x 3) Tab Take 600 mg by mouth once daily. 63 tablet 11     tadalafiL (CIALIS) 5 MG tablet Take 2 tablets (10 mg total) by mouth daily as needed for Erectile Dysfunction. 20 tablet 1     traZODone (DESYREL) 100 MG tablet Take 1 tablet (100 mg total) by mouth every evening. 30 tablet 2        Review of patient's allergies indicates:  No Known Allergies    Past Medical History:   Diagnosis Date    Breast cancer     Cancer     R breast    Diabetes mellitus     HTN (hypertension)     Leg edema, right     Prediabetes     Seizures     at age 18 - stress related    Therapy     marital counseling     Past Surgical History:   Procedure Laterality Date    AXILLARY NODE DISSECTION Right 11/22/2019    Procedure: LYMPHADENECTOMY, AXILLARY RIGHT;  Surgeon: Shima Whyte MD;  Location: [facility];  Service: General;  Laterality: Right;    AXILLARY NODE DISSECTION Right 12/17/2019    Procedure: LYMPHADENECTOMY, AXILLARY;  Surgeon: Shima Whyte MD;  Location: [facility] OR [address] [place] [address];  Service: General;  Laterality: Right;    BREAST BIOPSY      EXCISION OF HYDROCELE Right 10/28/2022    Procedure: HYDROCELECTOMY;  Surgeon: Anthony Cordero Jr., MD;  Location: [facility] OR [address] [place] [address];  Service: Urology;  Laterality: Right;  1 hour    INSERTION OF TUNNELED CENTRAL VENOUS CATHETER (CVC) WITH SUBCUTANEOUS PORT Left 5/24/2019    Procedure: [id number]-J-[id number];  Surgeon: Shima Whyte MD;  Location: [facility] OR [address] [place] [address];  Service: General;  Laterality: Left;    INSERTION OF TUNNELED CENTRAL VENOUS CATHETER (CVC) WITH SUBCUTANEOUS PORT Left 9/17/2021    Procedure: INSERTION, SINGLE LUMEN CATHETER WITH PORT, WITH FLUOROSCOPIC GUIDANCE, right;  Surgeon: Gloria Holliday MD;  Location: [facility] OR [address] [place] [address];  Service: General;  Laterality: Left;  rapid covid test    SENTINEL LYMPH NODE " BIOPSY Right 2019    Procedure: BIOPSY, LYMPH NODE, SENTINEL RIGHT;  Surgeon: Shima Whyte MD;  Location: [facility] OR;  Service: General;  Laterality: Right;    SIMPLE MASTECTOMY Right 2019    Procedure: MASTECTOMY, SIMPLE RIGHT (CONSENT AM OF) 2.5 hr case;  Surgeon: Shima Whyte MD;  Location: [facility] OR;  Service: General;  Laterality: Right;     Family History       Problem Relation (Age of Onset)    Breast cancer Paternal Grandmother    Colon cancer Maternal Grandfather    Diabetes Mother    Fibromyalgia Paternal Aunt    Hypertension Mother, Father    Lupus Father, Paternal Aunt    No Known Problems Maternal Grandmother, Paternal Grandfather, Sister, Maternal Aunt, Maternal Uncle, Paternal Uncle, Brother, Sister, Son, Son, Son, Son    Post-traumatic stress disorder Brother          Tobacco Use    Smoking status: Former     Current packs/day: 0.00     Average packs/day: 0.3 packs/day for 15.0 years (3.8 ttl pk-yrs)     Types: Cigarettes, Vaping with nicotine     Start date: 1999     Quit date: 2014     Years since quittin.3    Smokeless tobacco: Never    Tobacco comments:     Social smoker with alcohol    Substance and Sexual Activity    Alcohol use: Yes     Alcohol/week: 0.0 standard drinks of alcohol     Comment: Rarely    Drug use: Not Currently     Types: Marijuana    Sexual activity: Yes     Partners: Female     Birth control/protection: None     Review of Systems  Objective:     Weight: (!) 141.5 kg (311 lb 15.9 oz)  Body mass index is 41.16 kg/m².  Vital Signs (Most Recent):  Temp: 98.6 °F (37 °C) (24 0759)  Pulse: 64 (24 1108)  Resp: 19 (24 1141)  BP: (!) 123/58 (24 0759)  SpO2: 99 % (24 0759) Vital Signs (24h Range):  Temp:  [98.3 °F (36.8 °C)-98.6 °F (37 °C)] 98.6 °F (37 °C)  Pulse:  [60-74] 64  Resp:  [16-20] 19  SpO2:  [98 %-100 %] 99 %  BP: (123-138)/(58-63) 123/58                                 Physical Exam           Neurosurgery Physical  Exam    General: well developed, well nourished, no distress.   HEENT: normocephalic, atraumatic  CV: regular rate   Pulmonary: normal respirations, no signs of respiratory distress  Abdomen: soft, non-distended, not tender to palpation  Skin: Skin is warm, dry and intact  Heme: no bruising    Neuro:   Mental Status: AO x4, no aphasia, no dysarthria   CN: Left PERRL, EOMI, VF intact to confrontation. R pupil is fixed and dilated. Decreased visual acuity. Cannot abduct/adduct. Minimal superior/inferior movement  sensation intact bilaterally, eyebrow raise and grimace symmetric, tongue midline  Motor: moves all extremities spontaneously, full strength throughout, no pronator drift   Sensory: intact to light touch throughout  Cerebellar: finger to nose intact bilaterally  Reflexes: -Munoz's, -Babinski, no clonus. Patellar: 2+ bilaterally       Significant Labs:  Recent Labs   Lab 03/20/24 1927 03/21/24  0353   GLU 84 84   * 131*   K 4.1 4.0    101   CO2 23 21*   BUN 16 12   CREATININE 1.1 0.9   CALCIUM 9.7 8.9   MG  --  1.5*     Recent Labs   Lab 03/20/24 1927 03/21/24  0353   WBC 4.88 3.86*   HGB 8.5* 7.6*   HCT 28.8* 24.4*    336     Recent Labs   Lab 03/20/24 1919 03/20/24 1927   INR 1.2 1.1     Microbiology Results (last 7 days)       ** No results found for the last 168 hours. **          All pertinent labs from the last 24 hours have been reviewed.    Significant Diagnostics:  CT: No results found in the last 24 hours.  MRI: No results found in the last 24 hours.  I have reviewed all pertinent imaging results/findings within the past 24 hours.  Assessment/Plan:     * Vision loss of left eye  47 yo male with metastatic breast cancer presenting due to decreased vision from his left eye the last few weeks that has got acutely worse since yesterday who was found to have a soft tissue mass in the L orbit on MRI concerning for met.    CTA 3/20: Irregularity of the intracranial vertebral  "arteries and left PCA as above, likely atherosclerotic disease.  No high-grade stenosis or large vessel occlusion. Osseous metastatic disease similar to prior outside MRI." Opthalmology was consulted who recommended     MRI 3/20: abnormal soft tissue lesion in the posterior left orbital apex/optic canal which is adjacent to or involving the inferior rectus and medial rectus extra-ocular muscles with associated enhancement, and could represent metastatic disease.      Recommendations:  -NSGY evaluated at bedside  -Consult Rad/Onc    -Likely needs radiation  -Fu in 3 mo with MRI orbit  -No acute neurosurgical intervention at this time    Discussed with Dr. Diaz        Thank you for your consult. I will sign off. Please contact us if you have any additional questions.    Michael Rogers MD  Neurosurgery  [facility] - Oncology ([facility])  "

## 2024-03-21 NOTE — HPI
"49 y/o M with PMHx of metastatic breast cancer with mets to the spine, HTN DM, seizure disorder presents to the [facility] ED for vision loss in the left eye that began this morning at around 8:30 a.m. when he awoke from sleep. Per patient, he has been having issues with blurry vision in the left eye for the last several weeks now, which he describes as a "dirty lint". This morning, he experienced "a complete black out" in that left eye. He states they drove from [place], [place] to come to [facility]. Stroke code called on arrival to the ED.  "

## 2024-03-21 NOTE — CONSULTS
Consultation Report  Ophthalmology Service    Date: 03/20/2024    Chief complaint/Reason for Consult: blurry vision left eye     History of Present Illness: Paul Li Jr. is a 48 y.o. male who presents with blurry vision left eye with painless, sudden onset earlier this morning. He has a history of metastatic breast cancer s/p mastectomy 2019 with mets to spine and pelvis. Notes decreased vision for past several months, seen by ophtho who examined and noted normal exam. Was supposed to return for follow up oct n/mac did not return. Today he notes decreased vision in his left eye. Denies flashes of light, floaters, curtain, halos, glare, redness, pain.     POcularHx: Refractive error with astigmatism, wears glasses    Current eye gtts: none      PMHx:  has a past medical history of Breast cancer, Cancer, Diabetes mellitus, HTN (hypertension), Leg edema, right, Prediabetes, Seizures, and Therapy.     PSurgHx:  has a past surgical history that includes Insertion of tunneled central venous catheter (CVC) with subcutaneous port (Left, 5/24/2019); Simple mastectomy (Right, 11/22/2019); [place] lymph node biopsy (Right, 11/22/2019); Axillary node dissection (Right, 11/22/2019); Axillary node dissection (Right, 12/17/2019); Breast biopsy; Insertion of tunneled central venous catheter (CVC) with subcutaneous port (Left, 9/17/2021); and Excision of hydrocele (Right, 10/28/2022).     Home Medications:   Prior to Admission medications    Medication Sig Start Date End Date Taking? Authorizing Provider   ALPRAZolam (XANAX) 0.5 MG tablet Take 1 tablet (0.5 mg total) by mouth 3 (three) times daily as needed for Insomnia or Anxiety. 2/14/24 3/15/24  Gabe Cooper MD   amLODIPine (NORVASC) 10 MG tablet TAKE 1 TABLET (10 MG) BY MOUTH EVERY DAY 11/9/23   Richard Keys NP   bisacodyL (DULCOLAX) 5 mg EC tablet Take 1 tablet (5 mg total) by mouth daily as needed for Constipation. 1/3/24   Gabe Cooper MD   calcium-vitamin D3  (OYSTER SHELL CALCIUM-VIT D3) 500 mg-5 mcg (200 unit) per tablet Take 1 tablet by mouth 2 (two) times daily. 12/22/23 12/21/24  Cecily Walsh MD   diclofenac sodium (VOLTAREN) 1 % Gel Apply 2 g topically 4 (four) times daily. Apply to the low back for inflammation control. 12/13/23   Cecily Walsh MD   diphenoxylate-atropine 2.5-0.025 mg (LOMOTIL) 2.5-0.025 mg per tablet Take 1 tablet by mouth 4 (four) times daily as needed for Diarrhea. 5/25/23   Angeles Rodriguez MD   dulaglutide (TRULICITY) 1.5 mg/0.5 mL pen injector Inject 1.5 mg into the skin once a week. 2/25/24   Kareem Arroyo MD   ferrous sulfate (IRON) 325 mg (65 mg iron) Tab tablet Take 1 tablet (325 mg total) by mouth once daily. 1/24/24   Gabe Cooper MD   FLUoxetine 40 MG capsule Take 2 capsules (80 mg total) by mouth once daily. 2/29/24 4/29/24  Rosa Linn MD   gabapentin (NEURONTIN) 300 MG capsule Take 3 capsules (900 mg total) by mouth 3 (three) times daily. 1/24/24   Lynn Adorno DNP   hydroCHLOROthiazide (HYDRODIURIL) 25 MG tablet TAKE 1 TABLET BY MOUTH ONCE DAILY 11/20/23   Richard Keys, NP   HYDROcodone-acetaminophen (NORCO)  mg per tablet Take 1 tablet by mouth every 8 (eight) hours as needed for Pain. 2/14/24   Gabe Cooper MD   ibuprofen (ADVIL,MOTRIN) 600 MG tablet Take 1 tablet (600 mg total) by mouth every 8 (eight) hours as needed for Pain. 7/17/23   Rosa Linn MD   insulin lispro (HUMALOG KWIKPEN INSULIN) 100 unit/mL pen Inject 5 Units into the skin 3 (three) times daily. 9/13/23 9/12/24  Kareem Arroyo MD   lancets 33 gauge Misc 1 lancet by Misc.(Non-Drug; Combo Route) route once daily. 5/12/22   Kareem Arroyo MD   lancing device Misc 1 Device by Misc.(Non-Drug; Combo Route) route 2 (two) times daily with meals. 10/12/19 10/28/22  Katelynn Bae MD   LEVEMIR FLEXPEN 100 unit/mL (3 mL) InPn pen INJECT 21 UNITS INTO THE SKIN EVERY EVENING. 12/12/23 3/11/24  Cruz,  "Kareem FRIEDMAN MD   LIDOcaine (LIDODERM) 5 % Place 1 patch onto the skin once daily. Remove & Discard patch within 12 hours or as directed by MD 3/29/23   Richard Keys NP   LIDOcaine (LIDODERM) 5 % Place 1 patch onto the skin once daily. Remove & Discard patch within 12 hours or as directed by MD 3/29/23   Rosa Linn MD   loratadine (CLARITIN) 10 mg tablet Take 1 tablet (10 mg total) by mouth once daily. 7/13/23 7/12/24  Richard Keys NP   losartan (COZAAR) 50 MG tablet Take 2 tablets by mouth once daily 11/9/23   Richard Keys NP   metFORMIN (GLUCOPHAGE) 500 MG tablet Take 2 tablets (1,000 mg total) by mouth 2 (two) times daily with meals. Needs appointment for future refills 12/18/23 4/12/24  Michelle Grayson NP   OLANZapine (ZYPREXA) 5 MG tablet Take 1 tablet (5 mg total) by mouth every evening. days 1-4 of each chemotherapy cycle. 1/3/24   Gabe Cooper MD   ondansetron (ZOFRAN-ODT) 8 MG TbDL Dissolve 1 tablet (8 mg total) by mouth or dissolve on tongue every 8 (eight) hours as needed (nausea/vomiting).  Let saliva dissolve tablet. 8/30/23   Lisette Tuttle MD   pen needle, diabetic (BD ULTRA-FINE TANESHA PEN NEEDLE) 32 gauge x 5/32" Ndle Use as directed with novolog and levemir (4 times daily) 3/30/23   Kareem Arroyo MD   ribociclib (KISQALI) 600 mg/day (200 mg x 3) Tab Take 600 mg by mouth once daily. 2/28/24   Paul Gutierrez MD   tadalafiL (CIALIS) 5 MG tablet Take 2 tablets (10 mg total) by mouth daily as needed for Erectile Dysfunction. 11/16/22 11/16/23  Michelle Grayson NP   traZODone (DESYREL) 100 MG tablet Take 1 tablet (100 mg total) by mouth every evening. 2/29/24 5/29/24  Kareem Arroyo MD   insulin aspart U-100 (NOVOLOG FLEXPEN U-100 INSULIN) 100 unit/mL (3 mL) InPn pen Inject 5 Units into the skin 3 (three) times daily with meals. Use sliding scale provided to patient 11/12/21 1/25/22  Kareem Arroyo MD   losartan-hydrochlorothiazide 100-25 mg " (HYZAAR) 100-25 mg per tablet TAKE 1 TABLET BY MOUTH EVERY DAY 10/25/19 10/27/19  Kareem Arroyo MD        Medications this encounter:    phenylephrine HCL 2.5%  1 drop Both Eyes 1 time in Clinic/HOD    proparacaine  1 drop Both Eyes 1 time in Clinic/HOD    tropicamide 1%  1 drop Both Eyes 1 time in Clinic/HOD       Allergies: has No Known Allergies.     Social:  reports that he quit smoking about 9 years ago. His smoking use included cigarettes and vaping with nicotine. He started smoking about 24 years ago. He has a 3.8 pack-year smoking history. He has never used smokeless tobacco. He reports current alcohol use. He reports that he does not currently use drugs after having used the following drugs: Marijuana.     Family Hx: No family history of glaucoma, macular degeneration, or blindness. family history includes Breast cancer in his paternal grandmother; Colon cancer in his maternal grandfather; Diabetes in his mother; Fibromyalgia in his paternal aunt; Hypertension in his father and mother; Lupus in his father and paternal aunt; No Known Problems in his brother, maternal aunt, maternal grandmother, maternal uncle, paternal grandfather, paternal uncle, sister, sister, son, son, son, and son; Post-traumatic stress disorder in his brother.     ROS: see hpi     Ocular examination/Dilated fundus examination:  Base Eye Exam       Visual Acuity (Snellen - Linear)         Right Left    Dist cc 20/25 HM              Tonometry (Tonopen, 9:44 PM)         Right Left    Pressure 17 17              Pupils         Dark Light Shape React APD    Right 4 2 Round Brisk None    Left 3 3 Round Minimal present              Extraocular Movement         Right Left     -- -- --   --  --   -- -- --    -4 -4 -4   -4  -4   -4 -4 -4                 Neuro/Psych       Oriented x3: Yes    Mood/Affect: Normal              Dilation       Both eyes: 1% Mydriacyl, 2.5% Phenylephrine @ 9:45 PM                  Slit Lamp and Fundus Exam        External Exam         Right Left    External Normal Normal              Slit Lamp Exam         Right Left    Lids/Lashes Meibomian gland dysfunction; 2 capped glands UL  Meibomian gland dysfunction    Conjunctiva/Sclera Pinguecula Pinguecula    Cornea Clear Clear    Anterior Chamber Deep and quiet Deep and quiet    Iris Round and reactive Round and reactive    Lens 1+ Nuclear sclerosis 1+ Nuclear sclerosis    Anterior Vitreous Normal Normal              Fundus Exam         Right Left    Disc Normal Normal    C/D Ratio 0.2 0.15    Macula Normal Normal    Vessels Normal Normal    Periphery No Diabetic Retinopathy No Diabetic Retinopathy                CTA multiphase  - Subtle irregularity P2 segment of the left PCA (series 5, image 455). The anterior, middle, and right posterior cerebral arteries are within. No acute intracranial finding.       =======================================    Assessment/Plan:   #Decreased vision left eye  - patient notes decreased vision left eye for past several months, today felt sharper decrease in vision prompting him to come in  - BCVA 20/25 // HM. Was noted as CF 1-2 months prior. IOP good 17 OU. Left pupil 3mm fixed APD present. EOM limited in all gazes OS.   - Anterior exam wnl, significant for cataract  - DFE exam wnl, macula flat attached, sharp pink disc. No retinal tears, holes, or attachments seen, no posterior pathology seen  - Patient had normal ophthalmology exam few months prior with outside ophthalmologist and was recommended to get imaging but patient did not get it. Prior HVF at that time 2 months prior showed significant depression left eye with sparing superonasal quadrant   - Recommend mri brain/orbit given metastatic history and possible mass posterior to orbit  - Possible ischemic event need to rule out, so recommend full stroke work up if not completed due to vasculopath risk factors (ie cancer, DM, HTN).   - Recommend carotid duplex by US, echo, EKG. CTA already  done.   - Recommend ESR, CRP, platelets, however low suspicion of GCA given patient age, denies jaw claudication, temporal pain, proximal weakness, systemic signs of inflammation ie cachexia/fever.   - If patient admitted will follow up tomorrow inpatient. Otherwise scheduled outpatient clinic to see patient tomorrow for HVF and OCT RNFL, repeat exam, image review.       If there are further questions, please page the on call ophthalmology resident.    Purnima Mccall MD  PGY-2, Ophthalmology  03/20/2024  9:46 PM

## 2024-03-21 NOTE — H&P
"[facility] - Oncology ([facility])  [facility] Medicine  History & Physical    Patient Name: Paul Li Jr.  MRN: [id number]  Admission Date: 3/20/2024  Attending Physician: Melony Varela MD   Primary Care Provider: Kareem Arroyo MD         Patient information was obtained from patient, spouse/SO, past medical records, and ER records.       Subjective:     Principal Problem:Vision loss of left eye    Chief Complaint:   Chief Complaint   Patient presents with    Loss of Vision     Blurred vision x weeks and then today left eye loss of sight since 0830; drove straight home from [place] to get here. Metastatic CA on chemo. Pt of Dr Linn. Was scheduled to have MRI but then had loss of vision.        HPI: 49 yo M with metastatic triple negative breast cancer with BRCA1 mutation (on sacituzumab-govetican Q3w cycle 4, last 2/12), T2DM, HTN, presenting with acute on chronic vision loss. Patient evaluated at bedside virtually. Patient reports vision loss was acute in L eye this AM after waking up. Patient reports vision was normal in R eye which was improved but noticed no vision in L eye. Patient wife also reported change in eye movement and described as "lazy". No associated pain with vision loss or eye movement. Patient reports retro orbital pain that radiates to posterior. Patient also reports pain with pressure on L side. Patient reports new onset nausea/ vomiting associated with HA. Patient reports taking hydrocodone and morphine with no relief. Patient denies any recent infectious symptoms including fever chills, eye discharge, cough, SOB or GI symptoms. Patient denies any urinary symptoms.     In the ED patient was afebrile, normotensive and normocardic with SpO2 >95 on RA. Labs were significant for WBC 4.8, hgb 8.5 at baseline, , Na 135, K 4.1, CO2 23, BUN 16 and Cr 1.1. TSH wnl and Coag wnl. Stroke code was activated and CTA demonstrated "Irregularity of the intracranial vertebral arteries and left PCA as " "above, likely atherosclerotic disease.  No high-grade stenosis or large vessel occlusion. Osseous metastatic disease similar to prior outside MRI." Opthalmology was consulted who recommended MRI which demonstrated " There is abnormal soft tissue lesion in the posterior left orbital apex/optic canal which is adjacent to or involving the inferior rectus and medial rectus extra-ocular muscles with associated enhancement, and could represent metastatic disease.  This abuts and compresses the posterior optic nerve at the posterior left orbital apex. There is minimal thickening and increased T2 signal with probable minimal enhancement of the anterior left optic nerve that could represent mild optic neuritis." Patient admitted to medical oncology for further management of acute vision loss.       Oncology History  Malignant neoplasm involving both nipple and areola of right breast in male, estrogen receptor negative  stage IB, now metastatic  Metastatic Triple Negative Breast Cancer: progressed from prior Stage IB   5/2/2019: Right breast retroareolar core biopsy  S/p Right breast mastectomy with axillary lymph node dissection in 2019  S/p neoadjuvant ddAC/carbo-taxol, adjuvant xeloda, XRT   Recurrence with bone metastasis in 8/2021, received abraxane and progressed on it in 3/2023  Started on Trodelvy in 5/2023 - ongoing  - His schedule of trodelvy is b0ckamif with growth factor support due to   neutropenia   Liquid biopsy: PIK3CA mutation  PDL 1 <1%  Precerted for Sacituzumab govitecan-hziy-for a transfer of facility  - PET CT 10/10/23:               - reviewed with previous images  - upon our review, exam is stable except subcarinal LN increase in size (max SUV of 10.9 measuring 2.4 x 2.7 cm, previously was 1.5cm with max SUV 10)  - MRI C spine from 12/15/23 reviewed, shows degenerative changes but no concerning metastatic lesions. Reports for lumbar spine and thoracic spine not uploaded, have requested from [facility]. " Reviewed images, show known T6 lesion (seen on PET scan Oct 2023) but no other clear lesions.    Past Medical History:   Diagnosis Date    Breast cancer     Cancer     R breast    Diabetes mellitus     HTN (hypertension)     Leg edema, right     Prediabetes     Seizures     at age 18 - stress related    Therapy     marital counseling       Past Surgical History:   Procedure Laterality Date    AXILLARY NODE DISSECTION Right 11/22/2019    Procedure: LYMPHADENECTOMY, AXILLARY RIGHT;  Surgeon: Shima Whyte MD;  Location: [facility];  Service: General;  Laterality: Right;    AXILLARY NODE DISSECTION Right 12/17/2019    Procedure: LYMPHADENECTOMY, AXILLARY;  Surgeon: Shima Whyte MD;  Location: [facility] OR 2ND FLR;  Service: General;  Laterality: Right;    BREAST BIOPSY      EXCISION OF HYDROCELE Right 10/28/2022    Procedure: HYDROCELECTOMY;  Surgeon: Anthony Cordero Jr., MD;  [facility];  Service: Urology;  Laterality: Right;  1 hour    INSERTION OF TUNNELED CENTRAL VENOUS CATHETER (CVC) WITH SUBCUTANEOUS PORT Left 5/24/2019    Procedure: [id number]-[id number]-G-CATH;  Surgeon: Shima Whyte MD;  [facility];  Service: General;  Laterality: Left;    INSERTION OF TUNNELED CENTRAL VENOUS CATHETER (CVC) WITH SUBCUTANEOUS PORT Left 9/17/2021    Procedure: INSERTION, SINGLE LUMEN CATHETER WITH PORT, WITH FLUOROSCOPIC GUIDANCE, right;  Surgeon: Gloria Holliday MD;  [facility];  Service: General;  Laterality: Left;  rapid covid test    SENTINEL LYMPH NODE BIOPSY Right 11/22/2019    Procedure: BIOPSY, LYMPH NODE, SENTINEL RIGHT;  Surgeon: Shima Whyte MD;  Location: [facility];  Service: General;  Laterality: Right;    SIMPLE MASTECTOMY Right 11/22/2019    Procedure: MASTECTOMY, SIMPLE RIGHT (CONSENT AM OF) 2.5 hr case;  Surgeon: Shima Whyte MD;  Location: [facility];  Service: General;  Laterality: Right;       Review of patient's allergies indicates:  No Known Allergies    No current  facility-administered medications on file prior to encounter.     Current Outpatient Medications on File Prior to Encounter   Medication Sig    ALPRAZolam (XANAX) 0.5 MG tablet Take 1 tablet (0.5 mg total) by mouth 3 (three) times daily as needed for Insomnia or Anxiety.    amLODIPine (NORVASC) 10 MG tablet TAKE 1 TABLET (10 MG) BY MOUTH EVERY DAY    bisacodyL (DULCOLAX) 5 mg EC tablet Take 1 tablet (5 mg total) by mouth daily as needed for Constipation.    calcium-vitamin D3 (OYSTER SHELL CALCIUM-VIT D3) 500 mg-5 mcg (200 unit) per tablet Take 1 tablet by mouth 2 (two) times daily.    diclofenac sodium (VOLTAREN) 1 % Gel Apply 2 g topically 4 (four) times daily. Apply to the low back for inflammation control.    diphenoxylate-atropine 2.5-0.025 mg (LOMOTIL) 2.5-0.025 mg per tablet Take 1 tablet by mouth 4 (four) times daily as needed for Diarrhea.    dulaglutide (TRULICITY) 1.5 mg/0.5 mL pen injector Inject 1.5 mg into the skin once a week.    ferrous sulfate (IRON) 325 mg (65 mg iron) Tab tablet Take 1 tablet (325 mg total) by mouth once daily.    FLUoxetine 40 MG capsule Take 2 capsules (80 mg total) by mouth once daily.    gabapentin (NEURONTIN) 300 MG capsule Take 3 capsules (900 mg total) by mouth 3 (three) times daily.    hydroCHLOROthiazide (HYDRODIURIL) 25 MG tablet TAKE 1 TABLET BY MOUTH ONCE DAILY    HYDROcodone-acetaminophen (NORCO)  mg per tablet Take 1 tablet by mouth every 8 (eight) hours as needed for Pain.    ibuprofen (ADVIL,MOTRIN) 600 MG tablet Take 1 tablet (600 mg total) by mouth every 8 (eight) hours as needed for Pain.    insulin lispro (HUMALOG KWIKPEN INSULIN) 100 unit/mL pen Inject 5 Units into the skin 3 (three) times daily.    lancets 33 gauge Misc 1 lancet by Misc.(Non-Drug; Combo Route) route once daily.    lancing device Misc 1 Device by Misc.(Non-Drug; Combo Route) route 2 (two) times daily with meals.    LEVEMIR FLEXPEN 100 unit/mL (3 mL) InPn pen INJECT 21 UNITS INTO THE SKIN  "EVERY EVENING.    LIDOcaine (LIDODERM) 5 % Place 1 patch onto the skin once daily. Remove & Discard patch within 12 hours or as directed by MD    LIDOcaine (LIDODERM) 5 % Place 1 patch onto the skin once daily. Remove & Discard patch within 12 hours or as directed by MD    loratadine (CLARITIN) 10 mg tablet Take 1 tablet (10 mg total) by mouth once daily.    losartan (COZAAR) 50 MG tablet Take 2 tablets by mouth once daily    metFORMIN (GLUCOPHAGE) 500 MG tablet Take 2 tablets (1,000 mg total) by mouth 2 (two) times daily with meals. Needs appointment for future refills    OLANZapine (ZYPREXA) 5 MG tablet Take 1 tablet (5 mg total) by mouth every evening. days 1-4 of each chemotherapy cycle.    ondansetron (ZOFRAN-ODT) 8 MG TbDL Dissolve 1 tablet (8 mg total) by mouth or dissolve on tongue every 8 (eight) hours as needed (nausea/vomiting).  Let saliva dissolve tablet.    pen needle, diabetic (BD ULTRA-FINE TANESHA PEN NEEDLE) 32 gauge x 5/32" Ndle Use as directed with novolog and levemir (4 times daily)    ribociclib (KISQALI) 600 mg/day (200 mg x 3) Tab Take 600 mg by mouth once daily.    tadalafiL (CIALIS) 5 MG tablet Take 2 tablets (10 mg total) by mouth daily as needed for Erectile Dysfunction.    traZODone (DESYREL) 100 MG tablet Take 1 tablet (100 mg total) by mouth every evening.    [DISCONTINUED] insulin aspart U-100 (NOVOLOG FLEXPEN U-100 INSULIN) 100 unit/mL (3 mL) InPn pen Inject 5 Units into the skin 3 (three) times daily with meals. Use sliding scale provided to patient    [DISCONTINUED] losartan-hydrochlorothiazide 100-25 mg (HYZAAR) 100-25 mg per tablet TAKE 1 TABLET BY MOUTH EVERY DAY     Family History       Problem Relation (Age of Onset)    Breast cancer Paternal Grandmother    Colon cancer Maternal Grandfather    Diabetes Mother    Fibromyalgia Paternal Aunt    Hypertension Mother, Father    Lupus Father, Paternal Aunt    No Known Problems Maternal Grandmother, Paternal Grandfather, Sister, Maternal " Aunt, Maternal Uncle, Paternal Uncle, Brother, Sister, Son, Son, Son, Son    Post-traumatic stress disorder Brother          Tobacco Use    Smoking status: Former     Current packs/day: 0.00     Average packs/day: 0.3 packs/day for 15.0 years (3.8 ttl pk-yrs)     Types: Cigarettes, Vaping with nicotine     Start date: 1999     Quit date: 2014     Years since quittin.3    Smokeless tobacco: Never    Tobacco comments:     Social smoker with alcohol    Substance and Sexual Activity    Alcohol use: Yes     Alcohol/week: 0.0 standard drinks of alcohol     Comment: Rarely    Drug use: Not Currently     Types: Marijuana    Sexual activity: Yes     Partners: Female     Birth control/protection: None     Review of Systems   Constitutional:  Negative for chills, fatigue and fever.   HENT:  Positive for congestion, sinus pressure and sinus pain. Negative for hearing loss and trouble swallowing.    Eyes:  Positive for visual disturbance. Negative for pain and discharge.   Respiratory:  Negative for cough, chest tightness and shortness of breath.    Cardiovascular:  Negative for chest pain, palpitations and leg swelling.   Gastrointestinal:  Positive for nausea and vomiting. Negative for abdominal pain, blood in stool, constipation and diarrhea.   Genitourinary:  Negative for dysuria and hematuria.   Musculoskeletal:  Negative for neck pain and neck stiffness.   Skin:  Negative for rash.   Neurological:  Positive for headaches. Negative for dizziness, seizures, facial asymmetry, speech difficulty, weakness, light-headedness and numbness.     Objective:     Vital Signs (Most Recent):  Temp: 98.3 °F (36.8 °C) (24 185)  Pulse: 74 (24)  Resp: 16 (24 0207)  BP: 138/63 (24)  SpO2: 100 % (24) Vital Signs (24h Range):  Temp:  [98.3 °F (36.8 °C)] 98.3 °F (36.8 °C)  Pulse:  [74] 74  Resp:  [16-20] 16  SpO2:  [100 %] 100 %  BP: (138)/(63) 138/63     Weight: (!) 141.5 kg (312  lb)  Body mass index is 41.16 kg/m².     Physical Exam  Vitals and nursing note reviewed.   Constitutional:       General: He is not in acute distress.  HENT:      Head: Normocephalic.      Mouth/Throat:      Mouth: Mucous membranes are moist.   Eyes:      Pupils: Pupils are unequal.   Cardiovascular:      Rate and Rhythm: Normal rate.   Pulmonary:      Effort: Pulmonary effort is normal. No respiratory distress.   Abdominal:      General: There is no distension.      Palpations: Abdomen is soft.      Tenderness: There is no abdominal tenderness.   Musculoskeletal:      Cervical back: Normal range of motion.      Right lower leg: No edema.      Left lower leg: No edema.   Neurological:      Mental Status: He is alert and oriented to person, place, and time.      Cranial Nerves: Cranial nerve deficit present.      Motor: No weakness.   Psychiatric:         Mood and Affect: Mood normal.         Behavior: Behavior normal.              CRANIAL NERVES     CN II   Visual acuity: decreased    CN III, IV, VI   Pupils: unequal  CN III: left CN III palsy  CN VI: left CN VI palsy  Upgaze: abnormal  Downgaze: abnormal  Conjugate gaze: absent       Significant Labs: All pertinent labs within the past 24 hours have been reviewed.  CBC:   Recent Labs   Lab 03/20/24 1927 03/21/24  0353   WBC 4.88 3.86*   HGB 8.5* 7.6*   HCT 28.8* 24.4*    336     CMP:   Recent Labs   Lab 03/20/24 1927 03/21/24  0353   * 131*   K 4.1 4.0    101   CO2 23 21*   GLU 84 84   BUN 16 12   CREATININE 1.1 0.9   CALCIUM 9.7 8.9   PROT 8.4 6.8   ALBUMIN 3.3* 2.9*   BILITOT 0.5 0.5   ALKPHOS 133 127   AST 30 25   ALT 14 11   ANIONGAP 10 9     POCT Glucose:   Recent Labs   Lab 03/20/24 1934   POCTGLUCOSE 85       Significant Imaging: I have reviewed all pertinent imaging results/findings within the past 24 hours.  Assessment/Plan:     * Vision loss of left eye  Patient presents to [facility] with complaint of acute worsening of vision loss in L  "eye. Reported 1st episode 2 month prior with worsening vision in the left eye, Recalls episodes of light sensitivity in this eye prior to vision decline. Seen by ophthalmology with normal examination. MRI brain/orbit demonstrated "abnormal soft tissue lesion in the posterior left orbital apex/optic canal which is adjacent to or involving the inferior rectus and medial rectus extra-ocular muscles with associated enhancement, and could represent metastatic disease.  This abuts and compresses the posterior optic nerve at the posterior left orbital apex.  2. There is minimal thickening and increased T2 signal with probable minimal enhancement of the anterior left optic nerve that could represent mild optic neuritis."     Plan   - admission to oncology for further evaluation  - NSGY consultation and optho following   - NPO for further evaluation in AM  - Dex 4 mg Q12  - neuro checks  - defer further work up for CVA and GCA given findings  -       Uncontrolled type 2 diabetes mellitus with hyperglycemia  Patient's FSGs are controlled on current medication regimen.  Last A1c reviewed-   Lab Results   Component Value Date    HGBA1C 5.6 11/29/2023     Most recent fingerstick glucose reviewed-   Recent Labs   Lab 03/20/24  1934   POCTGLUCOSE 85     Current correctional scale  Low  Maintain anti-hyperglycemic dose as follows-   Antihyperglycemics (From admission, onward)      Start     Stop Route Frequency Ordered    03/21/24 2100  insulin detemir U-100 (Levemir) pen 21 Units         -- SubQ Nightly 03/21/24 0225    03/21/24 0715  insulin aspart U-100 pen 3 Units         -- SubQ 3 times daily with meals 03/21/24 0220    03/21/24 0327  insulin aspart U-100 pen 0-5 Units         -- SubQ Before meals & nightly PRN 03/21/24 0229          Hold Oral hypoglycemics while patient is in the hospital.    Continue basal bolus insulin with detemir 21 and aspart 3 TID  Will likely need increase in aspart given steroids  SSI  Goal 140-180 " while admitted    Microcytic anemia  Baseline 8-9. MCV 76. Iron studies consistent with JOSÉ MIGUEL.     Continue PO iron    Malignant neoplasm involving both nipple and areola of right breast in male, estrogen receptor negative    See oncology history    On SACITUZUMAB q 2wk with Neulasta with last dose 2/14/24.     Primary hypertension  Continue home amlodipine, losartan and HCTZ. Potential increase given steroid use.         VTE Risk Mitigation (From admission, onward)           Ordered     enoxaparin injection 40 mg  Every 12 hours         03/21/24 0229     IP VTE HIGH RISK PATIENT  Once         03/21/24 0229     Place sequential compression device  Until discontinued         03/21/24 0229                         The attending portion of this evaluation, treatment, and documentation was performed per Jose Medina DO via Telemedicine AudioVisual using the secure [employer] software platform with 2 way audio/video. The provider was located off-site and the patient is located in the hospital. The aforementioned video software was utilized to document the relevant history and physical exam            Jose Medina DO  Department of Hospital Medicine   [facility] - Oncology ([facility])

## 2024-03-21 NOTE — ASSESSMENT & PLAN NOTE
Patient's FSGs are controlled on current medication regimen.  Last A1c reviewed-   Lab Results   Component Value Date    HGBA1C 5.6 11/29/2023     Most recent fingerstick glucose reviewed-   Recent Labs   Lab 03/20/24  1934   POCTGLUCOSE 85     Current correctional scale  Low  Maintain anti-hyperglycemic dose as follows-   Antihyperglycemics (From admission, onward)      Start     Stop Route Frequency Ordered    03/21/24 2100  insulin detemir U-100 (Levemir) pen 21 Units         -- SubQ Nightly 03/21/24 0225    03/21/24 0715  insulin aspart U-100 pen 3 Units         -- SubQ 3 times daily with meals 03/21/24 0220    03/21/24 0327  insulin aspart U-100 pen 0-5 Units         -- SubQ Before meals & nightly PRN 03/21/24 0229          Hold Oral hypoglycemics while patient is in the hospital.    Continue basal bolus insulin with detemir 21 and aspart 3 TID  Will likely need increase in aspart given steroids  SSI  Goal 140-180 while admitted

## 2024-03-21 NOTE — ASSESSMENT & PLAN NOTE
Patient w/ a hx of TNBC of right breast on SACITUZUMAB q 2wk with Neulasta with last dose 2/14/24. Patient w/ MRI findings concerning for metastatic dz of the R globe.    - No role for IP chemo/immuno

## 2024-03-21 NOTE — ASSESSMENT & PLAN NOTE
Patient w/ progressive eye pain, retro-orbital, aching in nature that has worsened over the past 6 weeks. MRI revealing orbital mass concerning for metastatic dz causing associated pain.    - Dex q6hr  - Percocet/dilaudid PRN

## 2024-03-21 NOTE — SUBJECTIVE & OBJECTIVE
Past Medical History:   Diagnosis Date    Breast cancer     Cancer     R breast    Diabetes mellitus     HTN (hypertension)     Leg edema, right     Prediabetes     Seizures     at age 18 - stress related    Therapy     marital counseling     Past Surgical History:   Procedure Laterality Date    AXILLARY NODE DISSECTION Right 11/22/2019    Procedure: LYMPHADENECTOMY, AXILLARY RIGHT;  Surgeon: Shima Whyte MD;  Location: [facility];  Service: General;  Laterality: Right;    AXILLARY NODE DISSECTION Right 12/17/2019    Procedure: LYMPHADENECTOMY, AXILLARY;  Surgeon: Shima Whtye MD;  [facility];  Service: General;  Laterality: Right;    BREAST BIOPSY      EXCISION OF HYDROCELE Right 10/28/2022    Procedure: HYDROCELECTOMY;  Surgeon: Anthony Cordero Jr., MD;  Location: [facility] OR [address] [place] [address];  Service: Urology;  Laterality: Right;  1 hour    INSERTION OF TUNNELED CENTRAL VENOUS CATHETER (CVC) WITH SUBCUTANEOUS PORT Left 5/24/2019    Procedure: [id number];  Surgeon: Shima Whyte MD;  Location: [facility] OR [address] [place] [address];  Service: General;  Laterality: Left;    INSERTION OF TUNNELED CENTRAL VENOUS CATHETER (CVC) WITH SUBCUTANEOUS PORT Left 9/17/2021    Procedure: INSERTION, SINGLE LUMEN CATHETER WITH PORT, WITH FLUOROSCOPIC GUIDANCE, right;  Surgeon: Gloria Holliday MD;  [facility];  Service: General;  Laterality: Left;  rapid covid test    SENTINEL LYMPH NODE BIOPSY Right 11/22/2019    Procedure: BIOPSY, LYMPH NODE, SENTINEL RIGHT;  Surgeon: Shima Whyte MD;  Location: [facility];  Service: General;  Laterality: Right;    SIMPLE MASTECTOMY Right 11/22/2019    Procedure: MASTECTOMY, SIMPLE RIGHT (CONSENT AM OF) 2.5 hr case;  Surgeon: Shima Whyte MD;  Location: [facility];  Service: General;  Laterality: Right;     Social History     Tobacco Use    Smoking status: Former     Current packs/day: 0.00     Average packs/day: 0.3 packs/day for 15.0 years (3.8 ttl pk-yrs)     Types: Cigarettes,  Vaping with nicotine     Start date: 1999     Quit date: 2014     Years since quittin.3    Smokeless tobacco: Never    Tobacco comments:     Social smoker with alcohol    Substance Use Topics    Alcohol use: Yes     Alcohol/week: 0.0 standard drinks of alcohol     Comment: Rarely    Drug use: Not Currently     Types: Marijuana     Review of patient's allergies indicates:  No Known Allergies    Medications: I have reviewed the current medication administration record.    (Not in a hospital admission)      Review of Systems   Constitutional:  Negative for chills and fever.   HENT:  Negative for trouble swallowing.    Eyes:  Positive for visual disturbance. Negative for pain.   Respiratory:  Negative for chest tightness and shortness of breath.    Cardiovascular:  Negative for chest pain and palpitations.   Gastrointestinal:  Negative for abdominal pain.   Genitourinary:  Negative for dysuria.   Musculoskeletal:  Negative for neck pain and neck stiffness.   Neurological:  Negative for dizziness, seizures, facial asymmetry, speech difficulty, weakness, light-headedness, numbness and headaches.     Objective:     Vital Signs (Most Recent):  Temp: 98.3 °F (36.8 °C) (24)  Pulse: 74 (24)  Resp: 20 (24)  BP: 138/63 (24)  SpO2: 100 % (24)    Vital Signs Range (Last 24H):  Temp:  [98.3 °F (36.8 °C)]   Pulse:  [74]   Resp:  [20]   BP: (138)/(63)   SpO2:  [100 %]        Physical Exam  Constitutional:       General: He is not in acute distress.  HENT:      Head: Normocephalic.   Eyes:      Pupils: Pupils are equal, round, and reactive to light.   Cardiovascular:      Rate and Rhythm: Normal rate.   Pulmonary:      Effort: Pulmonary effort is normal. No respiratory distress.   Neurological:      Mental Status: He is alert and oriented to person, place, and time.      Cranial Nerves: Cranial nerve deficit present.      Motor: No weakness.   Psychiatric:          "Mood and Affect: Mood normal.         Behavior: Behavior normal.              Neurological Exam:   LOC: alert  Attention Span: Good   Language: No aphasia  Articulation: No dysarthria  Orientation: Person, Place, Time   Visual Fields: Hemianopsia left  EOM (CN III, IV, VI): Full/intact  Pupils (CN II, III): PERRL  Facial Sensation (CN V): Normal  Facial Movement (CN VII): Symmetric facial expression    Motor: Arm left  Normal 5/5  Leg left  Normal 5/5  Arm right  Normal 5/5  Leg right Normal 5/5  Sensation: Intact to light touch, temperature and vibration      Laboratory:  CMP:   Recent Labs   Lab 03/20/24 1927   CALCIUM 9.7   ALBUMIN 3.3*   PROT 8.4   *   K 4.1   CO2 23      BUN 16   CREATININE 1.1   ALKPHOS 133   ALT 14   AST 30   BILITOT 0.5     CBC:   Recent Labs   Lab 03/20/24 1927   WBC 4.88   RBC 3.77*   HGB 8.5*   HCT 28.8*      MCV 76*   MCH 22.5*   MCHC 29.5*     Lipid Panel:   Recent Labs   Lab 03/20/24 1927   CHOL 164   LDLCALC 109.8   HDL 40   TRIG 71     Coagulation:   Recent Labs   Lab 03/20/24 1927   INR 1.1     Hgb A1C: No results for input(s): "HGBA1C" in the last 168 hours.  TSH:   Recent Labs   Lab 03/20/24 1927   TSH 1.365       Diagnostic Results:      Brain imaging/Vessel Imaging:  CTA Stroke MP - 3/20/2024    Impression:     No acute intracranial finding.     Irregularity of the intracranial vertebral arteries and left PCA as above, likely atherosclerotic disease.  No high-grade stenosis or large vessel occlusion.     Osseous metastatic disease similar to prior outside MRI    Cardiac Evaluation:   EKG from today - NSR, vent rate of 70bpm    "

## 2024-03-22 VITALS
HEIGHT: 73 IN | DIASTOLIC BLOOD PRESSURE: 61 MMHG | BODY MASS INDEX: 41.35 KG/M2 | HEART RATE: 61 BPM | SYSTOLIC BLOOD PRESSURE: 110 MMHG | TEMPERATURE: 99 F | WEIGHT: 312 LBS | RESPIRATION RATE: 17 BRPM | OXYGEN SATURATION: 99 %

## 2024-03-22 LAB
ALBUMIN SERPL BCP-MCNC: 2.8 G/DL (ref 3.5–5.2)
ALP SERPL-CCNC: 130 U/L (ref 55–135)
ALT SERPL W/O P-5'-P-CCNC: 13 U/L (ref 10–44)
ANION GAP SERPL CALC-SCNC: 10 MMOL/L (ref 8–16)
AST SERPL-CCNC: 18 U/L (ref 10–40)
BASOPHILS # BLD AUTO: 0 K/UL (ref 0–0.2)
BASOPHILS NFR BLD: 0 % (ref 0–1.9)
BILIRUB SERPL-MCNC: 0.4 MG/DL (ref 0.1–1)
BUN SERPL-MCNC: 23 MG/DL (ref 6–20)
CALCIUM SERPL-MCNC: 8.8 MG/DL (ref 8.7–10.5)
CHLORIDE SERPL-SCNC: 100 MMOL/L (ref 95–110)
CO2 SERPL-SCNC: 24 MMOL/L (ref 23–29)
CREAT SERPL-MCNC: 1.3 MG/DL (ref 0.5–1.4)
DIFFERENTIAL METHOD BLD: ABNORMAL
EOSINOPHIL # BLD AUTO: 0 K/UL (ref 0–0.5)
EOSINOPHIL NFR BLD: 0 % (ref 0–8)
ERYTHROCYTE [DISTWIDTH] IN BLOOD BY AUTOMATED COUNT: 18.9 % (ref 11.5–14.5)
EST. GFR  (NO RACE VARIABLE): >60 ML/MIN/1.73 M^2
GLUCOSE SERPL-MCNC: 172 MG/DL (ref 70–110)
HCT VFR BLD AUTO: 24.9 % (ref 40–54)
HGB BLD-MCNC: 7.6 G/DL (ref 14–18)
IMM GRANULOCYTES # BLD AUTO: 0.01 K/UL (ref 0–0.04)
IMM GRANULOCYTES NFR BLD AUTO: 0.3 % (ref 0–0.5)
LYMPHOCYTES # BLD AUTO: 0.5 K/UL (ref 1–4.8)
LYMPHOCYTES NFR BLD: 14 % (ref 18–48)
MAGNESIUM SERPL-MCNC: 1.8 MG/DL (ref 1.6–2.6)
MCH RBC QN AUTO: 23.3 PG (ref 27–31)
MCHC RBC AUTO-ENTMCNC: 30.5 G/DL (ref 32–36)
MCV RBC AUTO: 76 FL (ref 82–98)
MONOCYTES # BLD AUTO: 0.2 K/UL (ref 0.3–1)
MONOCYTES NFR BLD: 5.5 % (ref 4–15)
NEUTROPHILS # BLD AUTO: 2.9 K/UL (ref 1.8–7.7)
NEUTROPHILS NFR BLD: 80.2 % (ref 38–73)
NRBC BLD-RTO: 0 /100 WBC
PHOSPHATE SERPL-MCNC: 4.2 MG/DL (ref 2.7–4.5)
PLATELET # BLD AUTO: 392 K/UL (ref 150–450)
PMV BLD AUTO: 9.8 FL (ref 9.2–12.9)
POCT GLUCOSE: 138 MG/DL (ref 70–110)
POCT GLUCOSE: 142 MG/DL (ref 70–110)
POTASSIUM SERPL-SCNC: 4.8 MMOL/L (ref 3.5–5.1)
PROT SERPL-MCNC: 6.9 G/DL (ref 6–8.4)
RBC # BLD AUTO: 3.26 M/UL (ref 4.6–6.2)
SODIUM SERPL-SCNC: 134 MMOL/L (ref 136–145)
WBC # BLD AUTO: 3.64 K/UL (ref 3.9–12.7)

## 2024-03-22 PROCEDURE — 80053 COMPREHEN METABOLIC PANEL: CPT | Performed by: STUDENT IN AN ORGANIZED HEALTH CARE EDUCATION/TRAINING PROGRAM

## 2024-03-22 PROCEDURE — 83735 ASSAY OF MAGNESIUM: CPT | Performed by: STUDENT IN AN ORGANIZED HEALTH CARE EDUCATION/TRAINING PROGRAM

## 2024-03-22 PROCEDURE — 77387 GUIDANCE FOR RADJ TX DLVR: CPT | Mod: TC | Performed by: RADIOLOGY

## 2024-03-22 PROCEDURE — 63600175 PHARM REV CODE 636 W HCPCS: Performed by: STUDENT IN AN ORGANIZED HEALTH CARE EDUCATION/TRAINING PROGRAM

## 2024-03-22 PROCEDURE — 25000003 PHARM REV CODE 250

## 2024-03-22 PROCEDURE — G6002 STEREOSCOPIC X-RAY GUIDANCE: HCPCS | Mod: 26,,, | Performed by: RADIOLOGY

## 2024-03-22 PROCEDURE — 99239 HOSP IP/OBS DSCHRG MGMT >30: CPT | Mod: ,,, | Performed by: HOSPITALIST

## 2024-03-22 PROCEDURE — 25000003 PHARM REV CODE 250: Performed by: STUDENT IN AN ORGANIZED HEALTH CARE EDUCATION/TRAINING PROGRAM

## 2024-03-22 PROCEDURE — 85025 COMPLETE CBC W/AUTO DIFF WBC: CPT | Performed by: STUDENT IN AN ORGANIZED HEALTH CARE EDUCATION/TRAINING PROGRAM

## 2024-03-22 PROCEDURE — 63600175 PHARM REV CODE 636 W HCPCS

## 2024-03-22 PROCEDURE — 77412 RADIATION TX DELIVERY LVL 3: CPT | Performed by: RADIOLOGY

## 2024-03-22 PROCEDURE — 94761 N-INVAS EAR/PLS OXIMETRY MLT: CPT

## 2024-03-22 PROCEDURE — 36415 COLL VENOUS BLD VENIPUNCTURE: CPT | Performed by: STUDENT IN AN ORGANIZED HEALTH CARE EDUCATION/TRAINING PROGRAM

## 2024-03-22 PROCEDURE — 25000003 PHARM REV CODE 250: Performed by: EMERGENCY MEDICINE

## 2024-03-22 PROCEDURE — 84100 ASSAY OF PHOSPHORUS: CPT | Performed by: STUDENT IN AN ORGANIZED HEALTH CARE EDUCATION/TRAINING PROGRAM

## 2024-03-22 RX ORDER — INSULIN LISPRO 100 [IU]/ML
5 INJECTION, SOLUTION INTRAVENOUS; SUBCUTANEOUS 3 TIMES DAILY
Qty: 6 ML | Refills: 11 | Status: SHIPPED | OUTPATIENT
Start: 2024-03-22 | End: 2024-03-26 | Stop reason: SDUPTHER

## 2024-03-22 RX ORDER — INSULIN DETEMIR 100 [IU]/ML
11 INJECTION, SOLUTION SUBCUTANEOUS NIGHTLY
Qty: 15 ML | Refills: 2 | Status: SHIPPED | OUTPATIENT
Start: 2024-03-22 | End: 2024-03-26 | Stop reason: SDUPTHER

## 2024-03-22 RX ORDER — PANTOPRAZOLE SODIUM 40 MG/1
40 TABLET, DELAYED RELEASE ORAL DAILY
Qty: 30 TABLET | Refills: 1 | Status: SHIPPED | OUTPATIENT
Start: 2024-03-22

## 2024-03-22 RX ORDER — INSULIN DETEMIR 100 [IU]/ML
21 INJECTION, SOLUTION SUBCUTANEOUS NIGHTLY
Qty: 15 EACH | Refills: 2 | Status: SHIPPED | OUTPATIENT
Start: 2024-03-22 | End: 2024-03-22

## 2024-03-22 RX ORDER — INSULIN LISPRO 100 [IU]/ML
5 INJECTION, SOLUTION INTRAVENOUS; SUBCUTANEOUS 3 TIMES DAILY
Qty: 6 ML | Refills: 11 | Status: SHIPPED | OUTPATIENT
Start: 2024-03-22 | End: 2024-03-22

## 2024-03-22 RX ORDER — DEXAMETHASONE 4 MG/1
TABLET ORAL
Qty: 45 TABLET | Refills: 1 | Status: SHIPPED | OUTPATIENT
Start: 2024-03-22 | End: 2024-04-06

## 2024-03-22 RX ADMIN — GABAPENTIN 900 MG: 300 CAPSULE ORAL at 09:03

## 2024-03-22 RX ADMIN — CETIRIZINE HYDROCHLORIDE 10 MG: 10 TABLET, FILM COATED ORAL at 09:03

## 2024-03-22 RX ADMIN — POLYETHYLENE GLYCOL 3350 17 G: 17 POWDER, FOR SOLUTION ORAL at 09:03

## 2024-03-22 RX ADMIN — HYDROCHLOROTHIAZIDE 25 MG: 25 TABLET ORAL at 09:03

## 2024-03-22 RX ADMIN — MORPHINE SULFATE 30 MG: 30 TABLET, FILM COATED, EXTENDED RELEASE ORAL at 09:03

## 2024-03-22 RX ADMIN — AMLODIPINE BESYLATE 10 MG: 10 TABLET ORAL at 09:03

## 2024-03-22 RX ADMIN — LIDOCAINE 5% 2 PATCH: 700 PATCH TOPICAL at 05:03

## 2024-03-22 RX ADMIN — LOSARTAN POTASSIUM 100 MG: 50 TABLET, FILM COATED ORAL at 09:03

## 2024-03-22 RX ADMIN — HYDROMORPHONE HYDROCHLORIDE 1 MG: 1 INJECTION, SOLUTION INTRAMUSCULAR; INTRAVENOUS; SUBCUTANEOUS at 06:03

## 2024-03-22 RX ADMIN — SENNOSIDES 8.6 MG: 8.6 TABLET, FILM COATED ORAL at 09:03

## 2024-03-22 RX ADMIN — FERROUS SULFATE TAB EC 325 MG (65 MG FE EQUIVALENT) 1 EACH: 325 (65 FE) TABLET DELAYED RESPONSE at 09:03

## 2024-03-22 RX ADMIN — DEXAMETHASONE SODIUM PHOSPHATE 4 MG: 4 INJECTION INTRA-ARTICULAR; INTRALESIONAL; INTRAMUSCULAR; INTRAVENOUS; SOFT TISSUE at 05:03

## 2024-03-22 RX ADMIN — PANTOPRAZOLE SODIUM 40 MG: 40 TABLET, DELAYED RELEASE ORAL at 09:03

## 2024-03-22 RX ADMIN — FLUOXETINE HYDROCHLORIDE 80 MG: 20 CAPSULE ORAL at 09:03

## 2024-03-22 RX ADMIN — ENOXAPARIN SODIUM 40 MG: 40 INJECTION SUBCUTANEOUS at 09:03

## 2024-03-22 NOTE — PLAN OF CARE
AAOX4, verbal and able to make needs known. Admitted with loss of vision to left eye. Dx breast cancer with mets to spine and pelvic. Left eyes 5mm and fixed. Skin intact. General weakness noted to BLE. Edema to RLE. Up x1 assist. Afebrile. C/o pain 5/10 to lower back.20G to LUE intact. Radiation session in AM. Wife at bed side. Bed in lowest position, side rails up x2, call light in reach.

## 2024-03-22 NOTE — PLAN OF CARE
[facility] ([facility])      HOME HEALTH ORDERS  FACE TO FACE ENCOUNTER    Patient Name: Paul Li Jr.  YOB: 1975    PCP: Kareem Arroyo MD   PCP Address: [address] / [place] [address]  PCP Phone Number: [phone]  PCP Fax: [phone]    Encounter Date: 3/20/24    Admit to Home Health    Diagnoses:  Active Hospital Problems    Diagnosis  POA    *Vision loss of left eye [H54.62]  Yes    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]  Yes    Cancer related pain [G89.3]  Yes    Neuropathy [G62.9]  Yes    Uncontrolled type 2 diabetes mellitus with hyperglycemia [E11.65]  Yes    Malignant neoplasm involving both nipple and areola of right breast in male, estrogen receptor negative [C50.021, Z17.1]  Not Applicable     1.  Metastatic Breast Cancer:   1.  Right Breast Cancer Stage IB (pT1b pN1):  T=5 mm, G=2, N=1/15, ER=negative, CA=negative. UBH0gos: IHC 0, FISH=.  Ki67=80%.  MammaPrint=.  Date: 5/2/2019    A.  Histology: Invasive ductal carcinoma with lobular features    B.  Biopsy: 5/2/2019: Right breast retroareolar core biopsy    C.  Staging Studies:    D.  NeoAdjuvant Chemo:      ddAC x 4 / TaxolCarbo wkly x 12.   5/27/2019 - 10/15/2019    E.  Surgery:     11/22/2019: R Mastectomy/SLN Dr Whyte: Right breast mastectomy with SLN biopsy no LVI with 1 LN positive 4 mm, negative margins.     12/17/2019:  ALND  0/14 positive for carcinoma.    F.  XRT: 2/3/2020- 3/23/2020: Total Dose: 50.4 Gy to the chest wall and regional lymphatic    G.  Adjuvant Chemo:       Xeloda 1000 mg/m2 bid days 1-14 every 21 day x 6.   4/15/2020 - 9/28/20     2.  Recurrence:  7/2/2021.    A.  Biopsy:  Left iliac bone biopsy: 7/2/2021: Positive for metastatic breast ca    B.  Staging:     MRI T/L spine 6/3/2021:  Abnormal  T12, S1, S2 vertebral bodies, LEFT sacrum and ilium concerning for metastatic disease.      PET 6/9/2021: Multiple sclerotic lesions in the T12 vertebral body, sacrum, and left iliac  bone with hypermetabolic activity concerning for active metastatic disease.        C.  Treatment:     Abraxane:  8/13/2021 - 3/25/2023.     SACITUZUMAB q 2wk with Neulasta support:        5/19/2023 - 7/14/2023.        Resumed 8/30/2023 - 2/14/2024.  Progression following stable disease.    2.  Genetic Testing:  Genetic testing: AXIN2, RNF43 (VUS) identified.    3.  Vision loss L eye 2/28/2024.    TREATMENT PLAN:     Vision loss OS:  Consult Dr. Colbert 2/28/2024.    MRI Brain and orbits ordered 2/28/2024.     Faslodex pending   Abemaciclib/ribociclib pending    Discontinue trazodone with ribociclib.    Zometa q 4 wk:  8/2022 - .      Liquid biopsy:      PIK3CA mutation.  Pt receive Apelisib in the past.    PDL-1 <1%.    Restaging:   PET: 2/15/2022: stable osseous sclerotic lesions and pulmonary micro nodules  PET 6/6/2022: stable   PET 11/4/2022:stable  PET 3/20/2023: New derick metastasis (right hilar node). Otherwise stable  MRI sacrum/coccyx 3/29/2023: Extensive osseous metastatic disease of the pelvis involving much of the sacrum.  No evidence for invasion of the sacral canal or neural foramina.  MRI L-spine 3/29/2023: Dffuse osseous metastatic disease throughout the lumbar spine.  PET 6/22/2023: Findings suggestive of disease progression, noting worsening osseous metastatic disease and subcarinal/right hilar lymphadenopathy.  PET 10/10/2023:  Stable except subcarinal LN increase per PET.  CT images are not convincing.  PET 2/27/2024:  Progression in intra-thoracic LN.        Microcytic anemia [D50.9]  Yes     - Microcytic anemia present since atleast 2017 without significant change.  - Will test with hemoglobin electrophoresis in future.      Primary hypertension [I10]  Yes     - Managed by PCP        Resolved Hospital Problems   No resolved problems to display.       Follow Up Appointments:  Future Appointments   Date Time Provider Department Center   4/9/2024  3:00 PM Kareem Arroyo MD [facility]  [place]   4/15/2024  8:30 AM Rosa Linn MD [facility] [id number] [facility]   4/29/2024  1:00 PM Rogers Sheikh MD [facility] EYECLIN [address]   6/3/2024 10:45 AM [facility] [id number] [facility]   6/4/2024  9:30 AM Goyo Diaz MD [facility] NEUROS [facility]       Allergies:Review of patient's allergies indicates:  No Known Allergies    Medications: Review discharge medications with patient and family and provide education.    Current Facility-Administered Medications   Medication Dose Route Frequency Provider Last Rate Last Admin    amLODIPine tablet 10 mg  10 mg Oral Daily Jose Medina DO   10 mg at 03/22/24 0931    cetirizine tablet 10 mg  10 mg Oral Daily Jose Medina DO   10 mg at 03/22/24 0932    dexAMETHasone injection 4 mg  4 mg Intravenous Q6H Dequan Guido MD   4 mg at 03/22/24 0515    dextrose 10% bolus 125 mL 125 mL  12.5 g Intravenous PRN Jose Medina DO   Stopped at 03/21/24 0908    dextrose 10% bolus 250 mL 250 mL  25 g Intravenous PRN Jose Medina DO        enoxaparin injection 40 mg  40 mg Subcutaneous Q12H Jose Medina DO   40 mg at 03/22/24 0930    ferrous sulfate tablet 1 each  1 tablet Oral Daily Jose Medina DO   1 each at 03/22/24 0932    FLUoxetine capsule 80 mg  80 mg Oral Daily Jose Medina DO   80 mg at 03/22/24 0930    gabapentin capsule 900 mg  900 mg Oral TID Jose Medina DO   900 mg at 03/22/24 0931    glucagon (human recombinant) injection 1 mg  1 mg Intramuscular PRN Jose Medina DO        glucose chewable tablet 16 g  16 g Oral PRN Jose Medina DO        glucose chewable tablet 24 g  24 g Oral PRN Jose Medina DO        hydroCHLOROthiazide tablet 25 mg  25 mg Oral Daily Jose Medina DO   25 mg at 03/22/24 0931    HYDROcodone-acetaminophen  mg per tablet 1 tablet  1 tablet Oral Q8H PRN Jose Medina DO        HYDROmorphone injection 1 mg  1 mg Intravenous Q4H PRN Jose Medina,    1 mg at 03/22/24 0604     insulin aspart U-100 pen 0-5 Units  0-5 Units Subcutaneous QID (AC + HS) PRN Jose Medina DO        LIDOcaine 5 % patch 2 patch  2 patch Transdermal Q24H Jose Medina DO   2 patch at 03/22/24 0536    losartan tablet 100 mg  100 mg Oral Daily Jose Medina DO   100 mg at 03/22/24 0931    methocarbamoL tablet 500 mg  500 mg Oral TID PRN Dequan Guido MD   500 mg at 03/21/24 1832    morphine 12 hr tablet 30 mg  30 mg Oral Q12H Maya Darnell MD   30 mg at 03/22/24 0931    naloxone 0.4 mg/mL injection 0.02 mg  0.02 mg Intravenous PRN Jose Medina DO        ondansetron injection 4 mg  4 mg Intravenous Q8H PRN Jose Medina DO   4 mg at 03/21/24 1141    pantoprazole EC tablet 40 mg  40 mg Oral Daily Dequan Guido MD   40 mg at 03/22/24 0932    polyethylene glycol packet 17 g  17 g Oral BID Dequan Guido MD   17 g at 03/22/24 0932    senna tablet 8.6 mg  8.6 mg Oral Daily Dequan Guido MD   8.6 mg at 03/22/24 0932    sodium chloride 0.9% flush 10 mL  10 mL Intravenous Q12H PRN Jose Medina DO        traZODone tablet 100 mg  100 mg Oral QHS Jose Medina DO   100 mg at 03/21/24 2056     Current Discharge Medication List        START taking these medications    Details   dexAMETHasone (DECADRON) 4 MG Tab Take 2 tablets (8 mg total) by mouth 2 (two) times a day for 5 days, THEN 1 tablet (4 mg total) 3 (three) times daily for 5 days, THEN 1 tablet (4 mg total) 2 (two) times a day for 5 days. And then taper per radiation oncology.  Qty: 45 tablet, Refills: 1      pantoprazole (PROTONIX) 40 MG tablet Take 1 tablet (40 mg total) by mouth once daily. While taking dexamethasone  Qty: 30 tablet, Refills: 1           CONTINUE these medications which have CHANGED    Details   insulin detemir U-100, Levemir, (LEVEMIR FLEXPEN) 100 unit/mL (3 mL) InPn pen Inject 11 Units into the skin every evening. Patient had low blood sugar while in-patient, decreasing dose  Qty: 15 mL, Refills: 2     Associated Diagnoses: Type 2 diabetes mellitus without complication, unspecified whether long term insulin use      insulin lispro (HUMALOG KWIKPEN INSULIN) 100 unit/mL pen Inject 5 Units into the skin 3 (three) times daily.  Qty: 6 mL, Refills: 11    Associated Diagnoses: Type 2 diabetes mellitus without complication, unspecified whether long term insulin use           CONTINUE these medications which have NOT CHANGED    Details   ALPRAZolam (XANAX) 0.5 MG tablet Take 1 tablet (0.5 mg total) by mouth 3 (three) times daily as needed for Insomnia or Anxiety.  Qty: 60 tablet, Refills: 0    Associated Diagnoses: Anxiety associated with cancer diagnosis      amLODIPine (NORVASC) 10 MG tablet TAKE 1 TABLET (10 MG) BY MOUTH EVERY DAY  Qty: 90 tablet, Refills: 3    Comments: Please delete all prior scripts with same name and strength including on holds.  Associated Diagnoses: Hypertension, unspecified type; Current mild episode of major depressive disorder without prior episode      bisacodyL (DULCOLAX) 5 mg EC tablet Take 1 tablet (5 mg total) by mouth daily as needed for Constipation.  Qty: 30 tablet, Refills: 1    Associated Diagnoses: Drug-induced constipation      calcium-vitamin D3 (OYSTER SHELL CALCIUM-VIT D3) 500 mg-5 mcg (200 unit) per tablet Take 1 tablet by mouth 2 (two) times daily.  Qty: 180 tablet, Refills: 3    Associated Diagnoses: Bone metastases      diclofenac sodium (VOLTAREN) 1 % Gel Apply 2 g topically 4 (four) times daily. Apply to the low back for inflammation control.  Qty: 100 g, Refills: 0    Comments[facility]  Associated Diagnoses: Chronic midline low back pain with left-sided sciatica      diphenoxylate-atropine 2.5-0.025 mg (LOMOTIL) 2.5-0.025 mg per tablet Take 1 tablet by mouth 4 (four) times daily as needed for Diarrhea.  Qty: 60 tablet, Refills: 2    Associated Diagnoses: Diarrhea, unspecified type; Malignant neoplasm involving both nipple and areola of right breast  in male, estrogen receptor negative      dulaglutide (TRULICITY) 1.5 mg/0.5 mL pen injector Inject 1.5 mg into the skin once a week.  Qty: 4 pen , Refills: 5    Associated Diagnoses: Uncontrolled type 2 diabetes mellitus with hyperglycemia      ferrous sulfate (IRON) 325 mg (65 mg iron) Tab tablet Take 1 tablet (325 mg total) by mouth once daily.  Qty: 90 tablet, Refills: 1    Associated Diagnoses: Iron deficiency      FLUoxetine 40 MG capsule Take 2 capsules (80 mg total) by mouth once daily.  Qty: 60 capsule, Refills: 0    Associated Diagnoses: Current mild episode of major depressive disorder without prior episode      gabapentin (NEURONTIN) 300 MG capsule Take 3 capsules (900 mg total) by mouth 3 (three) times daily.  Qty: 270 capsule, Refills: 2    Associated Diagnoses: Chemotherapy-induced neuropathy      hydroCHLOROthiazide (HYDRODIURIL) 25 MG tablet TAKE 1 TABLET BY MOUTH ONCE DAILY  Qty: 90 tablet, Refills: 3    Comments: Please inactivate all prior scripts with same name and strength including on holds.  Associated Diagnoses: Essential hypertension      HYDROcodone-acetaminophen (NORCO)  mg per tablet Take 1 tablet by mouth every 8 (eight) hours as needed for Pain.  Qty: 90 tablet, Refills: 0    Comments: Quantity prescribed more than 7 day supply? Yes, quantity medically necessary  Associated Diagnoses: Neoplasm related pain      ibuprofen (ADVIL,MOTRIN) 600 MG tablet Take 1 tablet (600 mg total) by mouth every 8 (eight) hours as needed for Pain.  Qty: 20 tablet, Refills: 0    Associated Diagnoses: Malignant neoplasm metastatic to bone      lancets 33 gauge Misc 1 lancet by Misc.(Non-Drug; Combo Route) route once daily.  Qty: 100 each, Refills: 3    Associated Diagnoses: Type 2 diabetes mellitus without complication, without long-term current use of insulin      lancing device Misc 1 Device by Misc.(Non-Drug; Combo Route) route 2 (two) times daily with meals.  Qty: 1 each, Refills: 0      !!  "LIDOcaine (LIDODERM) 5 % Place 1 patch onto the skin once daily. Remove & Discard patch within 12 hours or as directed by MD  Qty: 7 patch, Refills: 0    Associated Diagnoses: Bone metastases; Neoplasm related pain      !! LIDOcaine (LIDODERM) 5 % Place 1 patch onto the skin once daily. Remove & Discard patch within 12 hours or as directed by MD  Qty: 7 patch, Refills: 0    Associated Diagnoses: Neoplasm related pain      loratadine (CLARITIN) 10 mg tablet Take 1 tablet (10 mg total) by mouth once daily.  Qty: 30 tablet, Refills: 3    Associated Diagnoses: Bone pain due to G-CSF      losartan (COZAAR) 50 MG tablet Take 2 tablets by mouth once daily  Qty: 180 tablet, Refills: 3    Comments: .  Associated Diagnoses: Essential hypertension; Current mild episode of major depressive disorder without prior episode      metFORMIN (GLUCOPHAGE) 500 MG tablet Take 2 tablets (1,000 mg total) by mouth 2 (two) times daily with meals. Needs appointment for future refills  Qty: 120 tablet, Refills: 2    Associated Diagnoses: Uncontrolled type 2 diabetes mellitus with hyperglycemia      morphine (MS CONTIN) 30 MG 12 hr tablet Take 30 mg by mouth 2 (two) times daily.      OLANZapine (ZYPREXA) 5 MG tablet Take 1 tablet (5 mg total) by mouth every evening. days 1-4 of each chemotherapy cycle.  Qty: 30 tablet, Refills: 0    Associated Diagnoses: Malignant neoplasm involving both nipple and areola of right breast in male, estrogen receptor negative      ondansetron (ZOFRAN-ODT) 8 MG TbDL Dissolve 1 tablet (8 mg total) by mouth or dissolve on tongue every 8 (eight) hours as needed (nausea/vomiting).  Let saliva dissolve tablet.  Qty: 60 tablet, Refills: 5    Associated Diagnoses: Malignant neoplasm involving both nipple and areola of right breast in male, estrogen receptor negative      pen needle, diabetic (BD ULTRA-FINE TANESHA PEN NEEDLE) 32 gauge x 5/32" Ndle Use as directed with novolog and levemir (4 times daily)  Qty: 100 each, " Refills: 5    Associated Diagnoses: Type 2 diabetes mellitus without complication, unspecified whether long term insulin use      ribociclib (KISQALI) 600 mg/day (200 mg x 3) Tab Take 600 mg by mouth once daily.  Qty: 63 tablet, Refills: 11    Comments: 3 tablets daily for 21 days then off 7 days.  Repeat every 28 days.  Associated Diagnoses: Malignant neoplasm involving both nipple and areola of right breast in male, estrogen receptor negative; Malignant neoplasm metastatic to bone      tadalafiL (CIALIS) 5 MG tablet Take 2 tablets (10 mg total) by mouth daily as needed for Erectile Dysfunction.  Qty: 20 tablet, Refills: 1    Comments: Bedside delivery to 5th floor [place]  Associated Diagnoses: Other male erectile dysfunction      traZODone (DESYREL) 100 MG tablet Take 1 tablet (100 mg total) by mouth every evening.  Qty: 30 tablet, Refills: 2    Associated Diagnoses: Malignant neoplasm involving both nipple and areola of right breast in male, estrogen receptor negative; Insomnia, unspecified type       !! - Potential duplicate medications found. Please discuss with provider.            I have seen and examined this patient within the last 30 days. 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     Diet:   regular diet      Referrals/ Consults  Physical Therapy to evaluate and treat. Evaluate for home safety and equipment needs; Establish/upgrade home exercise program. Perform / instruct on therapeutic exercises, gait training, transfer training, and Range of Motion.  Occupational Therapy to evaluate and treat. Evaluate home environment for safety and equipment needs. Perform/Instruct on transfers, ADL training, ROM, and therapeutic exercises.  Aide to provide assistance with personal  care, ADLs, and vital signs.    Activities:   activity as tolerated    Nursing:   Agency to admit patient within 24 hours of hospital discharge unless specified on physician order or at patient request    SN to complete comprehensive assessment including routine vital signs. Instruct on disease process and s/s of complications to report to MD. Review/verify medication list sent home with the patient at time of discharge  and instruct patient/caregiver as needed. Frequency may be adjusted depending on start of care date.     Skilled nurse to perform up to 3 visits PRN for symptoms related to diagnosis    Notify MD if SBP > 160 or < 90; DBP > 90 or < 50; HR > 120 or < 50; Temp > 101; O2 < 88%; Other:       Ok to schedule additional visits based on staff availability and patient request on consecutive days within the home health episode.    When multiple disciplines ordered:    Start of Care occurs on Sunday - Wednesday schedule remaining discipline evaluations as ordered on separate consecutive days following the start of care.    Thursday SOC -schedule subsequent evaluations Friday and Monday the following week.     Friday - Saturday SOC - schedule subsequent discipline evaluations on consecutive days starting Monday of the following week.    For all post-discharge communication and subsequent orders please contact patient's primary care physician.       Home Health Aide:  Physical Therapy Three times weekly, Occupational Therapy Three times weekly, and Home Health Aide Three times weekly    Wound Care Orders  no    I certify that this patient is confined to his home and needs physical therapy and occupational therapy.

## 2024-03-22 NOTE — DISCHARGE SUMMARY
"[facility] - Oncology ([facility])  Hematology/Oncology  Discharge Summary      Patient Name: Paul Li Jr.  MRN: [id number]  Admission Date: 3/20/2024  Hospital Length of Stay: 1 days  Discharge Date and Time:  03/22/2024 11:07 AM  Attending Physician: Melony Varela MD   Discharging Provider: Dequan Guido MD  Primary Care Provider: Kareem Arroyo MD    HPI: 49 yo M with metastatic triple negative breast cancer with BRCA1 mutation (on sacituzumab-govetican Q3w cycle 4, last 2/12), T2DM, HTN, presenting with acute on chronic vision loss. Patient evaluated at bedside virtually. Patient reports vision loss was acute in L eye this AM after waking up. Patient reports vision was normal in R eye which was improved but noticed no vision in L eye. Patient wife also reported change in eye movement and described as "lazy". No associated pain with vision loss or eye movement. Patient reports retro orbital pain that radiates to posterior. Patient also reports pain with pressure on L side. Patient reports new onset nausea/ vomiting associated with HA. Patient reports taking hydrocodone and morphine with no relief. Patient denies any recent infectious symptoms including fever chills, eye discharge, cough, SOB or GI symptoms. Patient denies any urinary symptoms.      In the ED patient was afebrile, normotensive and normocardic with SpO2 >95 on RA. Labs were significant for WBC 4.8, hgb 8.5 at baseline, , Na 135, K 4.1, CO2 23, BUN 16 and Cr 1.1. TSH wnl and Coag wnl. Stroke code was activated and CTA demonstrated "Irregularity of the intracranial vertebral arteries and left PCA as above, likely atherosclerotic disease.  No high-grade stenosis or large vessel occlusion. Osseous metastatic disease similar to prior outside MRI." Opthalmology was consulted who recommended MRI which demonstrated " There is abnormal soft tissue lesion in the posterior left orbital apex/optic canal which is adjacent to or involving the " "inferior rectus and medial rectus extra-ocular muscles with associated enhancement, and could represent metastatic disease.  This abuts and compresses the posterior optic nerve at the posterior left orbital apex. There is minimal thickening and increased T2 signal with probable minimal enhancement of the anterior left optic nerve that could represent mild optic neuritis." Patient admitted to medical oncology for further management of acute vision loss.         Oncology History  Malignant neoplasm involving both nipple and areola of right breast in male, estrogen receptor negative  stage IB, now metastatic  Metastatic Triple Negative Breast Cancer: progressed from prior Stage IB   5/2/2019: Right breast retroareolar core biopsy  S/p Right breast mastectomy with axillary lymph node dissection in 2019  S/p neoadjuvant ddAC/carbo-taxol, adjuvant xeloda, XRT   Recurrence with bone metastasis in 8/2021, received abraxane and progressed on it in 3/2023  Started on Trodelvy in 5/2023 - ongoing  - His schedule of trodelvy is t2gltwyj with growth factor support due to   neutropenia   Liquid biopsy: PIK3CA mutation  PDL 1 <1%  Precerted for Sacituzumab govitecan-hziy-for a transfer of facility  - PET CT 10/10/23:               - reviewed with previous images  - upon our review, exam is stable except subcarinal LN increase in size (max SUV of 10.9 measuring 2.4 x 2.7 cm, previously was 1.5cm with max SUV 10)  - MRI C spine from 12/15/23 reviewed, shows degenerative changes but no concerning metastatic lesions. Reports for lumbar spine and thoracic spine not uploaded, have requested from [facility]. Reviewed images, show known T6 lesion (seen on PET scan Oct 2023) but no other clear lesions.    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 MRI w/ findings concerning for metastatic dz to the L orbit, NSGY, optho consulted for intervention. Given lesion anatomy, patient " unable to do surgical procedure for removal per NSGY, plan for focused RT of lesion. Patient underwent session 1 and 2 of RT, with plans to complete RT out-patient and follow up with heme/onc for further management. Patient deemed medically stable for discharge    Goals of Care Treatment Preferences:  Code Status: Full Code    Health care agent: Efraín Li  [facility] care agent number: [phone]               Physical Exam  HENT:      Head: Normocephalic.      Right Ear: External ear normal.      Left Ear: External ear normal.   Eyes:      Extraocular Movements: Extraocular movements intact.   Cardiovascular:      Rate and Rhythm: Normal rate.      Pulses: Normal pulses.   Pulmonary:      Effort: Pulmonary effort is normal.   Abdominal:      General: Abdomen is flat.      Palpations: Abdomen is soft.   Musculoskeletal:         General: Normal range of motion.   Skin:     General: Skin is warm.      Capillary Refill: Capillary refill takes less than 2 seconds.   Neurological:      General: No focal deficit present.      Mental Status: He is alert and oriented to person, place, and time.         Consults:   Consults (From admission, onward)          Status Ordering Provider     Inpatient consult to Radiation Oncology  Once        Provider:  (Not yet assigned)    Completed CECY VO     Inpatient consult to Neurosurgery  Once        Provider:  (Not yet assigned)    Completed NAYI JACINTO     Inpatient consult to Hematology/Oncology  Once        Provider:  (Not yet assigned)    Completed ANYI JACINTO     Inpatient consult to Ophthalmology  Once        Provider:  (Not yet assigned)    Completed HARMEET MEADE     Inpatient consult to Vascular (Stroke) Neurology  Once        Provider:  (Not yet assigned)    Completed HARMEET MEADE            Significant Diagnostic Studies: N/A    Pending Diagnostic Studies:       None          Final Active Diagnoses:    Diagnosis Date Noted POA     "PRINCIPAL PROBLEM:  Vision loss of left eye [H54.62] 02/28/2024 Yes    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] 03/21/2024 Yes    Cancer related pain [G89.3] 03/21/2024 Yes    Neuropathy [G62.9] 03/25/2022 Yes    Uncontrolled type 2 diabetes mellitus with hyperglycemia [E11.65] 10/28/2020 Yes    Malignant neoplasm involving both nipple and areola of right breast in male, estrogen receptor negative [C50.021, Z17.1] 05/17/2019 Not Applicable    Microcytic anemia [D50.9] 05/17/2019 Yes    Primary hypertension [I10] 11/14/2017 Yes      Problems Resolved During this Admission:      Discharged Condition: good    Disposition: [place]-Health Care [facility] [place]    Follow Up:    Patient Instructions:   No discharge procedures on file.  Medications:  Reconciled Home Medications:      Medication List        START taking these medications      dexAMETHasone 4 MG Tab  Commonly known as: DECADRON  Take 2 tablets (8 mg total) by mouth 2 (two) times a day for 5 days, THEN 1 tablet (4 mg total) 3 (three) times daily for 5 days, THEN 1 tablet (4 mg total) 2 (two) times a day for 5 days. And then taper per radiation oncology.  Start taking on: March 22, 2024     pantoprazole 40 MG tablet  Commonly known as: PROTONIX  Take 1 tablet (40 mg total) by mouth once daily. While taking dexamethasone            CHANGE how you take these medications      LEVEMIR FLEXPEN 100 unit/mL (3 mL) Inpn pen  Generic drug: insulin detemir U-100 (Levemir)  Inject 11 Units into the skin every evening. Patient had low blood sugar while in-patient, decreasing dose  What changed:   how much to take  additional instructions            CONTINUE taking these medications      ALPRAZolam 0.5 MG tablet  Commonly known as: XANAX  Take 1 tablet (0.5 mg total) by mouth 3 (three) times daily as needed for Insomnia or Anxiety.     amLODIPine 10 MG tablet  Commonly known as: NORVASC  TAKE 1 TABLET (10 MG) BY MOUTH EVERY DAY     BD ULTRA-FINE TANESHA PEN NEEDLE 32 gauge x 5/32" " Ndle  Generic drug: pen needle, diabetic  Use as directed with novolog and levemir (4 times daily)     bisacodyL 5 mg EC tablet  Commonly known as: DULCOLAX  Take 1 tablet (5 mg total) by mouth daily as needed for Constipation.     diclofenac sodium 1 % Gel  Commonly known as: VOLTAREN  Apply 2 g topically 4 (four) times daily. Apply to the low back for inflammation control.     diphenoxylate-atropine 2.5-0.025 mg 2.5-0.025 mg per tablet  Commonly known as: LOMOTIL  Take 1 tablet by mouth 4 (four) times daily as needed for Diarrhea.     ferrous sulfate 325 mg (65 mg iron) Tab tablet  Commonly known as: IRON  Take 1 tablet (325 mg total) by mouth once daily.     FLUoxetine 40 MG capsule  Take 2 capsules (80 mg total) by mouth once daily.     gabapentin 300 MG capsule  Commonly known as: NEURONTIN  Take 3 capsules (900 mg total) by mouth 3 (three) times daily.     hydroCHLOROthiazide 25 MG tablet  Commonly known as: HYDRODIURIL  TAKE 1 TABLET BY MOUTH ONCE DAILY     HYDROcodone-acetaminophen  mg per tablet  Commonly known as: NORCO  Take 1 tablet by mouth every 8 (eight) hours as needed for Pain.     ibuprofen 600 MG tablet  Commonly known as: ADVIL,MOTRIN  Take 1 tablet (600 mg total) by mouth every 8 (eight) hours as needed for Pain.     insulin lispro 100 unit/mL pen  Commonly known as: HumaLOG KwikPen Insulin  Inject 5 Units into the skin 3 (three) times daily.     lancing device Misc  1 Device by Misc.(Non-Drug; Combo Route) route 2 (two) times daily with meals.     * LIDOcaine 5 %  Commonly known as: LIDODERM  Place 1 patch onto the skin once daily. Remove & Discard patch within 12 hours or as directed by MD     * LIDOcaine 5 %  Commonly known as: LIDODERM  Place 1 patch onto the skin once daily. Remove & Discard patch within 12 hours or as directed by MD     loratadine 10 mg tablet  Commonly known as: CLARITIN  Take 1 tablet (10 mg total) by mouth once daily.     losartan 50 MG tablet  Commonly known as:  COZAAR  Take 2 tablets by mouth once daily     metFORMIN 500 MG tablet  Commonly known as: GLUCOPHAGE  Take 2 tablets (1,000 mg total) by mouth 2 (two) times daily with meals. Needs appointment for future refills     morphine 30 MG 12 hr tablet  Commonly known as: MS CONTIN  Take 30 mg by mouth 2 (two) times daily.     OLANZapine 5 MG tablet  Commonly known as: ZyPREXA  Take 1 tablet (5 mg total) by mouth every evening. days 1-4 of each chemotherapy cycle.     ondansetron 8 MG Tbdl  Commonly known as: ZOFRAN-ODT  Dissolve 1 tablet (8 mg total) by mouth or dissolve on tongue every 8 (eight) hours as needed (nausea/vomiting).  Let saliva dissolve tablet.     ONETOUCH DELICA PLUS LANCET 33 gauge Misc  Generic drug: lancets  1 lancet by Misc.(Non-Drug; Combo Route) route once daily.     OYSTER SHELL CALCIUM-VIT D3 500 mg-5 mcg (200 unit) per tablet  Generic drug: calcium-vitamin D3  Take 1 tablet by mouth 2 (two) times daily.     ribociclib 600 mg/day (200 mg x 3) Tab  Commonly known as: KISQALI  Take 600 mg by mouth once daily.     tadalafiL 5 MG tablet  Commonly known as: CIALIS  Take 2 tablets (10 mg total) by mouth daily as needed for Erectile Dysfunction.     traZODone 100 MG tablet  Commonly known as: DESYREL  Take 1 tablet (100 mg total) by mouth every evening.     TRULICITY 1.5 mg/0.5 mL pen injector  Generic drug: dulaglutide  Inject 1.5 mg into the skin once a week.           * This list has 2 medication(s) that are the same as other medications prescribed for you. Read the directions carefully, and ask your doctor or other care provider to review them with you.                  Dequan Guido MD  Hematology/Oncology  [facility] - Oncology ([facility])

## 2024-03-22 NOTE — HOSPITAL COURSE
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 MRI w/ findings concerning for metastatic dz to the L orbit, NSGY, optho consulted for intervention. Given lesion anatomy, patient unable to do surgical procedure for removal per NSGY, plan for focused RT of lesion. Patient underwent session 1 and 2 of RT, with plans to complete RT out-patient and follow up with heme/onc for further management. Patient deemed medically stable for discharge

## 2024-03-25 ENCOUNTER — TELEPHONE (OUTPATIENT)
Dept: PSYCHIATRY | Facility: CLINIC | Age: 49
End: 2024-03-25
Payer: MEDICARE

## 2024-03-25 PROCEDURE — 77412 RADIATION TX DELIVERY LVL 3: CPT | Performed by: RADIOLOGY

## 2024-03-25 PROCEDURE — 77387 GUIDANCE FOR RADJ TX DLVR: CPT | Mod: TC | Performed by: RADIOLOGY

## 2024-03-25 PROCEDURE — G0180 MD CERTIFICATION HHA PATIENT: HCPCS | Mod: ,,, | Performed by: HOSPITALIST

## 2024-03-25 PROCEDURE — G6002 STEREOSCOPIC X-RAY GUIDANCE: HCPCS | Mod: 26,,, | Performed by: RADIOLOGY

## 2024-03-26 ENCOUNTER — PATIENT OUTREACH (OUTPATIENT)
Dept: ADMINISTRATIVE | Facility: CLINIC | Age: 49
End: 2024-03-26
Payer: MEDICARE

## 2024-03-26 DIAGNOSIS — C79.51 BONE METASTASES: ICD-10-CM

## 2024-03-26 DIAGNOSIS — G89.3 NEOPLASM RELATED PAIN: ICD-10-CM

## 2024-03-26 PROCEDURE — 77412 RADIATION TX DELIVERY LVL 3: CPT | Performed by: RADIOLOGY

## 2024-03-26 PROCEDURE — G6002 STEREOSCOPIC X-RAY GUIDANCE: HCPCS | Mod: 26,,, | Performed by: RADIOLOGY

## 2024-03-26 PROCEDURE — 77387 GUIDANCE FOR RADJ TX DLVR: CPT | Mod: TC | Performed by: RADIOLOGY

## 2024-03-26 NOTE — PROGRESS NOTES
C3 nurse spoke with Paul Li Jr. And spouse for a TCC post hospital discharge follow up call. The patient has a scheduled HOSFU appointment with Kareem Arroyo MD on 04/09/24 @ 1500.

## 2024-03-27 ENCOUNTER — PATIENT MESSAGE (OUTPATIENT)
Dept: HEMATOLOGY/ONCOLOGY | Facility: CLINIC | Age: 49
End: 2024-03-27
Payer: MEDICARE

## 2024-03-27 ENCOUNTER — TELEPHONE (OUTPATIENT)
Dept: FAMILY MEDICINE | Facility: CLINIC | Age: 49
End: 2024-03-27
Payer: MEDICARE

## 2024-03-27 ENCOUNTER — TELEPHONE (OUTPATIENT)
Dept: PSYCHIATRY | Facility: CLINIC | Age: 49
End: 2024-03-27
Payer: MEDICARE

## 2024-03-27 ENCOUNTER — OFFICE VISIT (OUTPATIENT)
Dept: PSYCHIATRY | Facility: CLINIC | Age: 49
End: 2024-03-27
Payer: COMMERCIAL

## 2024-03-27 DIAGNOSIS — F51.04 PSYCHOPHYSIOLOGICAL INSOMNIA: Primary | ICD-10-CM

## 2024-03-27 DIAGNOSIS — C80.1 ANXIETY ASSOCIATED WITH CANCER DIAGNOSIS: ICD-10-CM

## 2024-03-27 DIAGNOSIS — F41.1 ANXIETY ASSOCIATED WITH CANCER DIAGNOSIS: ICD-10-CM

## 2024-03-27 DIAGNOSIS — F43.23 ADJUSTMENT DISORDER WITH MIXED ANXIETY AND DEPRESSED MOOD: ICD-10-CM

## 2024-03-27 PROCEDURE — 77412 RADIATION TX DELIVERY LVL 3: CPT | Performed by: RADIOLOGY

## 2024-03-27 PROCEDURE — 77336 RADIATION PHYSICS CONSULT: CPT | Performed by: RADIOLOGY

## 2024-03-27 PROCEDURE — G6002 STEREOSCOPIC X-RAY GUIDANCE: HCPCS | Mod: 26,,, | Performed by: RADIOLOGY

## 2024-03-27 PROCEDURE — 99999 PR PBB SHADOW E&M-EST. PATIENT-LVL II: CPT | Mod: PBBFAC,,, | Performed by: PSYCHOLOGIST

## 2024-03-27 PROCEDURE — 90834 PSYTX W PT 45 MINUTES: CPT | Mod: S$GLB,,, | Performed by: PSYCHOLOGIST

## 2024-03-27 PROCEDURE — 77387 GUIDANCE FOR RADJ TX DLVR: CPT | Mod: TC | Performed by: RADIOLOGY

## 2024-03-27 RX ORDER — LIDOCAINE 50 MG/G
1 PATCH TOPICAL DAILY
Qty: 7 PATCH | Refills: 0 | Status: SHIPPED | OUTPATIENT
Start: 2024-03-27 | End: 2024-05-16 | Stop reason: SDUPTHER

## 2024-03-27 RX ORDER — ALPRAZOLAM 0.5 MG/1
0.5 TABLET ORAL 3 TIMES DAILY PRN
Qty: 60 TABLET | Refills: 0 | Status: SHIPPED | OUTPATIENT
Start: 2024-03-27 | End: 2024-06-12 | Stop reason: SDUPTHER

## 2024-03-27 NOTE — PROGRESS NOTES
"  PSYCHO-ONCOLOGY NOTE/ Individual Psychotherapy     Date: 3/27/2024   Site:  [facility]         Therapeutic Intervention: Met with patient.  Outpatient - Behavior modifying psychotherapy 45 min - CPT code 49571    Referring provider:  Kute intially, now Nod[place]ft    Chief complaint/reason for encounter: Met with patient to evaluate psychosocial adaptation to survivorship of metastatic breast cancer  The patient's last visit with me was on 3/20/2023.    Objective:    Paul Li Jr. arrived promptly for the session.  Mr. Li was independently ambulatory at the time of session. The patient was fully cooperative throughout the session.   Appearance: age appropriate, casually  dressed, well groomed  Behavior/Cooperation: friendly and cooperative  Speech: normal in rate, volume, and tone and appropriate quality, quantity and organization of sentences  Mood: euthymic  Affect: mood congruent, full range and appropriate  Thought Process: goal-directed, logical  Thought Content: normal,  No delusions or paranoia; did not appear to be responding to internal stimuli during the session  Orientation: grossly intact  Memory: Grossly intact  Attention Span/Concentration: Attends to session without distraction; reports no difficulty  Fund of Knowledge: average  Estimate of Intelligence: average from verbal skills and history  Cognition: grossly intact  Insight: patient has awareness of illness; adequate insight into own behavior and behavior of others  Judgment: the patient's behavior is adequate to circumstances      Interval history and content of current session: Patient reports recent change in physical functioning (tumor on optical nerve, lost vision). He is having radiation treatments to restore vision (with some benefit already).  Discussed patient's response to illness and family response to his illness. He is coping adequately. Discussed his ongoing commitment to "doing whatever I can" to extend his life. His " "wife is struggling to accept this progression. She was offended by [facility] discussion while he was inpatient ("She thought it meant the end was near"). Patient discussed his wife's support needs.    Patient discussed his coping during the past year in [place]. He states being around all of his children and grandchildren was wonderful. He and his wife were living independently, which allowed them time to focus on their relationship. He believes they are closer now than they have been in years. He did have strain with one of his friends (friend/partner was offended that he did a deal outside their partnership). Patient reports having become very depressed by this lack of social support ("just wanted to stay in bed"). He spoke to his friend about it this week and feels they resolved some issues, but relationship has not bounced back.    Discussed fatigue management due to radiation treatment    Sleep: onset problems ("mind racing"), getting 4-5 hours/sleep, no naps, prior good sleep was "about 5 hours"; discussed time in bed v time asleep     Risk parameters:   Patient reports no suicidal ideation  Patient reports no homicidal ideation  Patient reports no self-injurious behavior  Patient reports no violent behavior   Safety needs:  None at this time      Verbal deficits: None     Patient's response to intervention:The patient's response to intervention is accepting, motivated.     Progress toward goals and other mental status changes:  The patient's progress toward goals is good.      Progress to date:Progress as Expected      Goals from last visit: Met      Patient reported outcomes:      Distress Thermometer:   Distress Score    Distress Score: 3        Practical Problems Physical Problems                                                   Family Problems                                         Emotional Problems                                                         Spiritual/Religions Concerns               Other Problems  "             PHQ-9= 5; Initial visit: 8       BILL-7=5; Initial visit: 8         10/17/2022     9:38 AM   GAD7   1. Feeling nervous, anxious, or on edge?    2. Not being able to stop or control worrying?    3. Worrying too much about different things?    4. Trouble relaxing?    5. Being so restless that it is hard to sit still?    6. Becoming easily annoyed or irritable?    7. Feeling afraid as if something awful might happen?    8. If you checked off any problems, how difficult have these problems made it for you to do your work, take care of things at home, or get along with other people?    BILL-7 Score        Information is confidential and restricted. Go to Review Flowsheets to unlock data.         Client Strengths: verbal, intelligent, successful, good social support, commitment to wellness, strong guadalupe, strong cultural traditions      Treatment Plan:individual psychotherapy  Target symptoms: adjustment  Why chosen therapy is appropriate versus another modality: relevant to diagnosis, patient responds to this modality, evidence based practice  Outcome monitoring methods: self-report, observation, checklist/rating scale  Therapeutic intervention type: behavior modifying psychotherapy  Prognosis: Good      Behavioral goals:    Exercise:   Stress management:     Social engagement:    Nutrition:    Smoking Cessation:   Therapy: sleep tracking    Wife with PsychOnc for temporary support (transition to psychiatry asap)    Return to clinic: 1 week     Length of Service (minutes direct face-to-face contact): 45      ICD-10-CM ICD-9-CM   1. Adjustment disorder with mixed anxiety and depressed mood  F43.23 309.28   2. Psychophysiological insomnia  F51.04 307.42         Matthew Sandoval, PhD  [place] License #[id number]

## 2024-03-27 NOTE — TELEPHONE ENCOUNTER
----- Message from Lisa Jamison sent at 3/27/2024 11:45 AM CDT -----  Contact: [facility]  Type:  Needs Medical Advice    Who Called: [facility]    Would the patient rather a call back or a response via [facility]? Call back    Best Call Back Number:     Additional Information: they are sending a recommendation for pt to start statin    Please call to advise  Thanks

## 2024-03-28 ENCOUNTER — DOCUMENTATION ONLY (OUTPATIENT)
Dept: RADIATION THERAPY | Facility: HOSPITAL | Age: 49
End: 2024-03-28
Payer: MEDICARE

## 2024-03-28 PROCEDURE — G6002 STEREOSCOPIC X-RAY GUIDANCE: HCPCS | Mod: 26,,, | Performed by: RADIOLOGY

## 2024-03-28 PROCEDURE — 77417 THER RADIOLOGY PORT IMAGE(S): CPT | Performed by: RADIOLOGY

## 2024-03-28 PROCEDURE — 77412 RADIATION TX DELIVERY LVL 3: CPT | Performed by: RADIOLOGY

## 2024-03-28 PROCEDURE — 77387 GUIDANCE FOR RADJ TX DLVR: CPT | Mod: TC | Performed by: RADIOLOGY

## 2024-03-28 PROCEDURE — 77427 RADIATION TX MANAGEMENT X5: CPT | Mod: ,,, | Performed by: RADIOLOGY

## 2024-04-01 ENCOUNTER — HOSPITAL ENCOUNTER (OUTPATIENT)
Dept: RADIATION THERAPY | Facility: HOSPITAL | Age: 49
Discharge: HOME | End: 2024-04-01
Attending: RADIOLOGY
Payer: MEDICARE

## 2024-04-01 PROCEDURE — 77412 RADIATION TX DELIVERY LVL 3: CPT | Performed by: RADIOLOGY

## 2024-04-01 PROCEDURE — 77387 GUIDANCE FOR RADJ TX DLVR: CPT | Mod: TC | Performed by: RADIOLOGY

## 2024-04-01 PROCEDURE — G6002 STEREOSCOPIC X-RAY GUIDANCE: HCPCS | Mod: 26,,, | Performed by: RADIOLOGY

## 2024-04-02 ENCOUNTER — OFFICE VISIT (OUTPATIENT)
Dept: HEMATOLOGY/ONCOLOGY | Facility: CLINIC | Age: 49
End: 2024-04-02
Payer: MEDICARE

## 2024-04-02 DIAGNOSIS — C79.51 MALIGNANT NEOPLASM METASTATIC TO BONE: ICD-10-CM

## 2024-04-02 DIAGNOSIS — D63.0 ANEMIA IN NEOPLASTIC DISEASE: ICD-10-CM

## 2024-04-02 DIAGNOSIS — C79.31 SECONDARY MALIGNANT NEOPLASM OF BRAIN: ICD-10-CM

## 2024-04-02 DIAGNOSIS — T45.1X5A CHEMOTHERAPY-INDUCED NEUTROPENIA: ICD-10-CM

## 2024-04-02 DIAGNOSIS — E66.01 CLASS 3 OBESITY: ICD-10-CM

## 2024-04-02 DIAGNOSIS — Z17.1 MALIGNANT NEOPLASM INVOLVING BOTH NIPPLE AND AREOLA OF RIGHT BREAST IN MALE, ESTROGEN RECEPTOR NEGATIVE: Primary | ICD-10-CM

## 2024-04-02 DIAGNOSIS — H54.62 VISION LOSS OF LEFT EYE: ICD-10-CM

## 2024-04-02 DIAGNOSIS — C50.021 MALIGNANT NEOPLASM INVOLVING BOTH NIPPLE AND AREOLA OF RIGHT BREAST IN MALE, ESTROGEN RECEPTOR NEGATIVE: Primary | ICD-10-CM

## 2024-04-02 DIAGNOSIS — D70.1 CHEMOTHERAPY-INDUCED NEUTROPENIA: ICD-10-CM

## 2024-04-02 DIAGNOSIS — G89.3 NEOPLASM RELATED PAIN: ICD-10-CM

## 2024-04-02 DIAGNOSIS — F41.1 ANXIETY ASSOCIATED WITH CANCER DIAGNOSIS: ICD-10-CM

## 2024-04-02 DIAGNOSIS — C80.1 ANXIETY ASSOCIATED WITH CANCER DIAGNOSIS: ICD-10-CM

## 2024-04-02 PROBLEM — E66.813 CLASS 3 OBESITY: Status: ACTIVE | Noted: 2020-12-24

## 2024-04-02 PROCEDURE — 99215 OFFICE O/P EST HI 40 MIN: CPT | Mod: 95,,, | Performed by: NURSE PRACTITIONER

## 2024-04-02 PROCEDURE — 4010F ACE/ARB THERAPY RXD/TAKEN: CPT | Mod: CPTII,95,, | Performed by: NURSE PRACTITIONER

## 2024-04-02 PROCEDURE — 1111F DSCHRG MED/CURRENT MED MERGE: CPT | Mod: CPTII,95,, | Performed by: NURSE PRACTITIONER

## 2024-04-02 RX ORDER — MORPHINE SULFATE 30 MG/1
30 TABLET, FILM COATED, EXTENDED RELEASE ORAL 2 TIMES DAILY
Qty: 60 TABLET | Refills: 0 | Status: SHIPPED | OUTPATIENT
Start: 2024-04-02 | End: 2024-04-02 | Stop reason: SDUPTHER

## 2024-04-02 RX ORDER — MORPHINE SULFATE 30 MG/1
30 TABLET, FILM COATED, EXTENDED RELEASE ORAL 2 TIMES DAILY
Qty: 60 TABLET | Refills: 0 | Status: SHIPPED | OUTPATIENT
Start: 2024-04-02 | End: 2024-05-01 | Stop reason: DRUGHIGH

## 2024-04-02 NOTE — PROGRESS NOTES
Subjective   The patient location is: [place]  The chief complaint leading to consultation is: metastatic breast cancer    Visit type: audiovisual      40minutes of total time spent on the encounter, which includes face to face time and non-face to face time preparing to see the patient (eg, review of tests), Obtaining and/or reviewing separately obtained history, Documenting clinical information in the electronic or other health record, Independently interpreting results (not separately reported) and communicating results to the patient/family/caregiver, or Care coordination (not separately reported).         Each patient to whom he or she provides medical services by telemedicine is:  (1) informed of the relationship between the physician and patient and the respective role of any other health care provider with respect to management of the patient; and (2) notified that he or she may decline to receive medical services by telemedicine and may withdraw from such care at any time.    Notes:       Patient ID: Paul Li Jr. is a 48 y.o. male.    Chief Complaint: No chief complaint on file.    HPI      Presents for urgent care visit as needs refill on pain medication.   I last saw him in 2023. In the interval, he moved to [place] and has been receiving treatments in [place] under the care of Dr. Serna  Most recent Trodelvy 2/14/2024. Most recent Zometa 11/29/2023.   Current pain  regimen -  morphine to 30mg q12 and PRN norco and PRN alprazolam.   He had a PET 2/27/2024: Progression in intra-thoracic LN. He has limited viseral disease in the liver, however, a change in therpay is needed. Note, this PET was done at [facility] and patient to get disk.    Plans to move back and resume care with Dr. Linn. Appt scheduled for 4/15/2024  Went to ED 3/20/2024 with vision loss and he was admitted with MRI findings concerning for metastatic disease to left orbit. Surgery not an option and RT recommended. He received 2 doses while in  hospital and is completing outpatient.     Today, feels well.   Peripheral vision maybe returning.   Able to see light.  RT helping - s/p 6 doses. Weaning steroids.   Reports Tip of tailbone pain. No worse than usual.   Current pain regimen relieving. Taking Morphine 30mg BID.   Only needing hydrocodone 1-2 times a week.   No bowel issues.   Otherwise feels well.   No HA  No CP/SOB  No other pain issues.           Diagnosis:  Metastatic TNBC progressed (prior Stage IB (T1wP3S0) triple negative invasive ductal carcinoma with lobular features of right breast, retroareolar, Grade 2, Ki67 80%, diagnosed 2019)     Oncology History:  Metastatic  Set up for scans (due to back pain) which were consistent for recurrence.   Imagin/3/2021 MRI T/L spine:  FINDINGS:  Alignment: Within normal limits.  Vertebrae: Low T1 signal intensity in the left T12 vertebral body extending to the left pedicle, S1 and S2 vertebral bodies with abnormal enhancement.  The vertebral heights are well maintained.  No evidence of acute fractures.  Abnormal appearance of the LEFT sacrum and ilium as well.  Discs: Degenerative disease of the level of L4-L5 with posterior annular fissure.  Conus appears within normal limits terminating at the level of the L2. level.  Cauda equina appears unremarkable.  Mild circumferential disc bulging at the level of T10-T11 abutting the ventral thecal sac.  No significant spinal canal stenosis or neural foraminal narrowing throughout the thoracic spine.  No significant spinal canal stenosis or neural foraminal narrowing throughout the lumbar spine.  Paraspinous muscles and soft tissues: Subcentimeter focal enhancement in the posterior column along the supraspinous ligament at the level of L1-L2 (sagittal series 21, image 10) potentially inflammatory versus neoplastic.  Impression:  Abnormal appearance of the T12, S1, S2 vertebral bodies as well as LEFT sacrum and ilium concerning for metastatic disease in this  patient with history of breast cancer.  No evidence for significant central canal narrowing.  Additional findings, as above.  This report was flagged in Epic as abnormal.     6/9/2021 PET scan:  COMPARISON:  MRI thoracic and lumbar spine 06/03/2021  FINDINGS:  Quality of the study: Adequate.  In the head and neck, there are no hypermetabolic lesions worrisome for malignancy. There are no hypermetabolic mucosal lesions, and there are no pathologically enlarged or hypermetabolic lymph nodes.  In the chest, there is postoperative change of right mastectomy/right axillary lymph node dissection.  There is low level hypermetabolic activity with mild soft tissue thickening in the skin/superficial subcutaneous fat of the right chest wall (axial fused image 78) with SUV max 3.6.  There is soft tissue thickening measuring approximately 1.8 x 7.9 cm in the subcutaneous fat of the right chest wall (axial series 3, image 84) with SUV max 3.1.  There are a few bilateral pulmonary nodules measuring up to 0.3 cm in the left lung (axial series 3, image 88) and 0.5 cm in the right lung (axial series 3, image 84).  These nodules are too small to characterize by PET.  There are no pathologically enlarged or hypermetabolic lymph nodes.  In the abdomen and pelvis, there is physiologic tracer distribution within the abdominal organs and excretion into the genitourinary system.  Subtle sen mesentery and multiple prominent mesenteric lymph nodes measuring up to 1.8 cm in long axis with background activity.  In the bones, there are several hypermetabolic lesions worrisome for malignancy.  Sclerotic lesion in the left T12 vertebral body (axial series 3, image 131) with SUV max 12.  Sclerotic lesions in the sacrum (for example axial series 3, image 188) with SUV max 9.7.  Sclerotic lesions in the left iliac bone (for example axial series 3, image 205) with SUV max 6.  In the extremities, there are no hypermetabolic lesions worrisome for  malignancy.  Incidental findings:  Bilateral maxillary antra mucous retention cysts.  Fat containing right inguinal hernia.  Impression:  1. In this patient with right breast carcinoma status post mastectomy/right axillary lymph node dissection, there are multiple sclerotic lesions in the T12 vertebral body, sacrum, and left iliac bone with hypermetabolic activity concerning for active metastatic disease and in keeping with findings on MRI 06/03/2021.  2. Additionally there is low-level hypermetabolic activity with soft tissue thickening in the right chest wall as detailed above.  These findings are nonspecific and may be related to postoperative/post radiation change, with recurrent malignancy not excluded.  3. Nonspecific mesenteric haziness and lymph nodes which may indicate mesenteric adenitis.  Recommend clinical correlation and attention on follow-up.  4. Additional findings as above.  I, Sohan Tillman MD, attest that I reviewed and interpreted the images.     In summary,   Abraxane C1 started 8/13/2021  Started aplelisib 8/22/2021   We had sent Guardant and discussed results with Molecular tumor board  He also had a repeat bx to confirm metastatic triple negative breast cancer  Plan:   Nab-Paclitaxel and Alpelisib  Reference: https://ascopubs.org/doi/abs/10.1200/[email or web address]  Also on Zometa-      Hydrocele repaired.        - 3/29/2023 MRI sacrum/coccyx:  FINDINGS:  There is abnormal T1 hypointense signal throughout the sacrum as well as the visualized left hemipelvis.  Multiple discrete foci also noted within the right partially visualized iliac bone.  These are accompanied by heterogeneous T2 signal as well as contrast enhancement and in keeping with osseous metastatic disease.  No evidence for epidural extension into the sacral canal or involvement of the sacral foramina.  No visualized soft tissue lesions.  No fracture.  Nonspecific mild soft tissue edema noted within the bilateral gluteal  minimus, piriformis and left iliacus muscles.  Mild degenerative changes are noted in the sacroiliac joints.  No pelvic ascites or lymphadenopathy seen.  Visualized sciatic nerves are unremarkable.  Impression:  Extensive osseous metastatic disease of the pelvis involving much of the sacrum.  No evidence for invasion of the sacral canal or neural foramina.     - MRI L-spine:  FINDINGS:  Alignment: Normal.  Vertebrae:  Numerous T1 hypointense, STIR hyperintense, enhancing lesions are seen throughout the lumbar spine..  No fracture.  No epidural extension.  Discs: Disc desiccation and mild height loss of L4-5.  Posterior annular fissure of L4-5.  Remaining discs appear normal in height and signal.  No evidence for discitis.  Cord: Normal.  Conus terminates at L2  Degenerative findings:  T12-L1: No spinal canal stenosis or neural foraminal narrowing.  L1-L2: No spinal canal stenosis or neural foraminal narrowing.  L2-L3: Mild facet arthropathy.  No spinal canal stenosis or neural foraminal narrowing.  L3-L4: Mild facet arthropathy.  No spinal canal stenosis or neural foraminal narrowing.  L4-L5: Circumferential disc bulge and moderate facet arthropathy.  No spinal canal stenosis or neural foraminal narrowing.  L5-S1: Mild facet arthropathy.  No spinal canal stenosis or neural foraminal narrowing.  Paraspinal muscles & soft tissues: Mild paraspinal muscle atrophy.  Impression:  Redemonstration of known diffuse osseous metastatic disease throughout the lumbar spine.  When compared to recent PET-CT, findings appear stable but progressed from prior MRI on 06/03/2021.  No fracture. No epidural extension.     - 3/20/2023 PET scan:  FINDINGS:  Quality of the study: Adequate.  In the head and neck, there are no hypermetabolic lesions worrisome for malignancy. There are no hypermetabolic mucosal lesions, and there are no pathologically enlarged or hypermetabolic lymph nodes.  In the chest, there is postoperative change of right  mastectomy and right axillary lymph node dissection.  There is a new 1.1 cm right hilar lymph node (3-101) with max SUV 6.3 (603-99).  There is a right lower lobe pulmonary nodule measuring 0.9 cm, which does not show increased tracer uptake, though has been gradually enlarging (measured 0.3 cm on 06/09/2021) (3-106).  In the abdomen and pelvis, there is physiologic tracer distribution within the abdominal organs and excretion into the genitourinary system.  In the bones, there are numerous sclerotic lesions, with few index lesions detailed below:  Sclerotic lesion in T7 with tracer uptake mildly increased prior exam with SUV max 5.8 (previously 5.2) (3-107) (603-109).  T12 sclerotic focus demonstrates an SUV max of 5.9 (previously 4.8) (3-153, 603-153).  L5 vertebral body sclerotic lesion with an SUV max of 6.8 (previously 8.7) (3-213).  Newly hypermetabolic focus at the inferior aspect of the left scapula with SUV max of 7.4 (603-103).  Increased size and uptake of a left ischial tuberosity sclerotic lesion with SUV max of 7.3 (3-266).  Increased size of a L4 vertebral body sclerotic lesion (3-200).  Extensive sclerotic change through the pelvic bones grossly stable compared to the prior exam.  CT: Right maxillary mucosal retention cysts.  Left chest wall Port-A-Cath.  Postoperative change of right mastectomy with right axillary lymph node dissection.  Small bilateral fat containing inguinal hernias.  Impression:  In this patient with breast cancer status post right mastectomy and right axillary lymph node dissection there has been progression of disease.  Newly tracer avid right hilar lymph node consistent with derick metastasis.  In the bones there are numerous sclerotic lesions, some of which demonstrate new abnormal uptake on FDG PET.  Suspicious right lower lobe pulmonary nodule does not show increase tracer uptake, though it is been gradually enlarging, and attention on follow-up is advised.  This report was  flagged in Epic as abnormal.       New treatment-   SACITUZUMAB q 2wk with Neulasta support:                            5/19/2023 - 7/14/2023.    Zometa q 4 wkly a:  8/2022 - .                   Liquid biopsy:                            PIK3CA mutation                          PDL-1 <1%.     He moved to [place] 7/2023 and transferred care to  Dr. Serna in [place].                          Resumed trodelvy 8/30/2023 -                   - PET CT 10/10/23:               - reviewed with previous images  - upon our review, exam is stable except subcarinal LN increase in size (max SUV of 10.9 measuring 2.4 x 2.7 cm, previously was 1.5cm with max SUV 10)  - MRI C spine from 12/15/23 reviewed, shows degenerative changes but no concerning metastatic lesions. Reports for lumbar spine and thoracic spine not uploaded, have requested from [facility]. Reviewed images, show known T6 lesion (seen on PET scan Oct 2023) but no other clear lesions.       PET 2/27/2024: Progression in intra-thoracic LN. He has limited viseral disease in the liver, however, a change in therpay is needed. Note, this PET was done at [facility] and patient to get disk.    Transferring care back to [facility]  Of note, Most recent Trodelvy 2/14/2024. Most recent Zometa 11/29/2023.       ED with vision loss-   3/20/2024 MRI head:   FINDINGS:  Orbits/Optic Nerves:  Minimal thickening and increased T2 signal with probable minimal enhancement of the left optic nerve could represent mild optic neuritis.  No significant abnormality on the right.  Globes appear within normal limits.  Remainder of the Intracranial Compartment:  No midline shift or hydrocephalus.  No extra-axial blood or fluid collections.  The brain parenchyma appears normal. No cerebral mass lesion, acute hemorrhage, edema, or acute infarct.  There is abnormal soft tissue lesion in the posterior left orbital apex and optic canal, which is adjacent to or involving the inferior rectus and medial rectus  extra-ocular muscles.  There is mild associated enhancement.  This could represent metastatic disease.  Minimal associated restricted diffusion on axial 35 of series 6.  This abuts and may compress the posterior optic nerve at the posterior left orbital apex.  Normal vascular flow voids are preserved.  Mild bilateral ethmoid and right maxillary sinus disease.  Skull/Extracranial Contents (limited evaluation): Bone marrow signal intensity is normal.  Impression:  1. There is abnormal soft tissue lesion in the posterior left orbital apex/optic canal which is adjacent to or involving the inferior rectus and medial rectus extra-ocular muscles with associated enhancement, and could represent metastatic disease.  This abuts and compresses the posterior optic nerve at the posterior left orbital apex.  2. There is minimal thickening and increased T2 signal with probable minimal enhancement of the anterior left optic nerve that could represent mild optic neuritis.  Follow-up recommended.  3. Mild paranasal sinus disease.  4. This report was flagged in Epic as abnormal.  Electronically signed by: Marco Clemente  Date:                                            03/21/2024  Time:                                           00:08        Undergoing RT to left orbit - s/p 6 treatments            Prior Oncology History:        Oncology History   Malignant neoplasm involving both nipple and areola of right breast in male, estrogen receptor negative   5/1/2019 Imaging Significant Findings     Mammogram and US  Impression:  Right  Mass: Right breast 14 mm mass at the retroareolar anterior position. Assessment: 4 - Suspicious finding. Biopsy is recommended.   Left  There is no mammographic or sonographic evidence of malignancy.  Recommendation:  Biopsy is recommended.      5/2/2019 Biopsy     BREAST, RIGHT, RETROAREOLAR MASS, ULTRASOUND-GUIDED BIOPSY:  Invasive ductal carcinoma with lobular features.  Size of invasive carcinoma: 5 MM in  greatest linear dimension within a single      5/2/2019 Breast Tumor Markers     Estrogen: Negative  Progesterone: Negative  HER2: Negative  Ki67: 80      5/17/2019 Cancer Staged     Staging form: Breast, AJCC 8th Edition  - Clinical stage from 5/17/2019: Stage IB (cT1c, cN0, cM0, G2, ER-, VT-, HER2-) - Signed by Richa Bahena MD on 5/17/2019 5/27/2019 - 7/21/2019 Chemotherapy     Treatment Summary   Plan Name: OP BREAST DOSE-DENSE AC - DOXORUBICIN CYCLOPHOSPHAMIDE Q2W  Treatment Goal: Curative  Status: Inactive  Start Date: 5/27/2019  End Date: 7/9/2019  Provider: Richa Bahena MD  Chemotherapy: DOXOrubicin chemo injection 186 mg, 60 mg/m2 = 186 mg, Intravenous, Clinic/HOD 1 time, 4 of 4 cycles  Administration: 186 mg (5/27/2019), 186 mg (6/10/2019), 186 mg (6/24/2019), 186 mg (7/8/2019)  cyclophosphamide (CYTOXAN) 600 mg/m2 = 1,865 mg in sodium chloride 0.9% 250 mL chemo infusion, 600 mg/m2 = 1,865 mg, Intravenous, Clinic/HOD 1 time, 4 of 4 cycles  Administration: 1,865 mg (5/27/2019), 1,865 mg (6/10/2019), 1,865 mg (6/24/2019), 1,865 mg (7/8/2019)      7/22/2019 - 10/15/2019 Chemotherapy     Treatment Summary   Plan Name: OP PACLITAXEL QW  Treatment Goal: Curative  Status: Inactive  Start Date: 7/24/2019  End Date: 10/8/2019  Provider: Richa Bahena MD  Chemotherapy: PACLitaxel (TAXOL) 80 mg/m2 = 246 mg in sodium chloride 0.9% 250 mL chemo infusion, 80 mg/m2 = 246 mg, Intravenous, Clinic/HOD 1 time, 12 of 12 cycles  Dose modification: 60 mg/m2 (original dose 80 mg/m2, Cycle 3), 55 mg/m2 (original dose 80 mg/m2, Cycle 7), 60 mg/m2 (original dose 80 mg/m2, Cycle 7), 50 mg/m2 (original dose 80 mg/m2, Cycle 9), 50 mg/m2 (original dose 80 mg/m2, Cycle 10)  Administration: 246 mg (7/24/2019), 246 mg (7/31/2019), 186 mg (8/7/2019), 186 mg (8/14/2019), 186 mg (8/21/2019), 186 mg (8/28/2019), 186 mg (9/4/2019), 174 mg (9/11/2019), 156 mg (9/18/2019), 156 mg (9/25/2019), 156 mg (10/1/2019), 156 mg  (10/8/2019)      11/22/2019 Breast Surgery     On 11/22/19 Dr whyte performed a right breast mastectomy with SLN biopsy with pathology showing grade 2, 6 mm IDC with extensive treatment effect, no LVI with 1 LN positive 4 mm, negative margins. The on 12/17/19, Dr Whyte performed right axillary dissection with pathology still pending.      11/22/2019 Cancer Staged     ypT1b N1      12/17/2019 Breast Surgery     Surgery: Dr Shima Whyte, [phone] performed right ALND with pathology showing 14 negative lymph nodes.             1/14/2020 Tumor Conference      Post mastectomy radiation therapy: Xeloda, then possible Keytruda trial .      2/3/2020 - 3/23/2020 Radiation Therapy     Treating physician: Speedy Nair MD   Total Dose: 50.4 Gy to the chest wall and regional lymphatics  10 Gy boost to the prostate.   Fractions: 28 fractions at 1.8 Gy per fraction  5 fractions at 2 Gy per fraction       2/28/2020 -  Chemotherapy     * 4/15/2020 - 9/28/20 completed 6 cycles adjuvant Xeloda 1000 mg/m2 bid days 1-14 every 21 days  Treatment Summary   Plan Name: OP CAPECITABINE 1250MG/M2 BID Q3W  Treatment Goal: Curative  Status: Active  Start Date: 3/20/2020  End Date: 3/20/2020  Provider: Richa Bahena MD  Chemotherapy: [No matching medication found in this treatment plan]       Review of Systems   Constitutional:         See Above   All other systems reviewed and are negative.         Objective     Physical Exam  Constitutional:       Comments: Limited PE as virtual   Appears comfortable and in NAD   Skin:     Comments: Yellowing/paleness of skin            Assessment and Plan     1. Malignant neoplasm involving both nipple and areola of right breast in male, estrogen receptor negative  Overview:  1.  Metastatic Breast Cancer:   1.  Right Breast Cancer Stage IB (pT1b pN1):  T=5 mm, G=2, N=1/15, ER=negative, OK=negative. ZLR3hmt: IHC 0, FISH=.  Ki67=80%.  MammaPrint=.  Date: 5/2/2019    A.  Histology: Invasive  ductal carcinoma with lobular features    B.  Biopsy: 5/2/2019: Right breast retroareolar core biopsy    C.  Staging Studies:    D.  NeoAdjuvant Chemo:      ddAC x 4 / TaxolCarbo wkly x 12.   5/27/2019 - 10/15/2019    E.  Surgery:     11/22/2019: R Mastectomy/SLN Dr Whyte: Right breast mastectomy with SLN biopsy no LVI with 1 LN positive 4 mm, negative margins.     12/17/2019:  ALND  0/14 positive for carcinoma.    F.  XRT: 2/3/2020- 3/23/2020: Total Dose: 50.4 Gy to the chest wall and regional lymphatic    G.  Adjuvant Chemo:       Xeloda 1000 mg/m2 bid days 1-14 every 21 day x 6.   4/15/2020 - 9/28/20     2.  Recurrence:  7/2/2021.    A.  Biopsy:  Left iliac bone biopsy: 7/2/2021: Positive for metastatic breast ca    B.  Staging:     MRI T/L spine 6/3/2021:  Abnormal  T12, S1, S2 vertebral bodies, LEFT sacrum and ilium concerning for metastatic disease.      PET 6/9/2021: Multiple sclerotic lesions in the T12 vertebral body, sacrum, and left iliac bone with hypermetabolic activity concerning for active metastatic disease.        C.  Treatment:     Abraxane:  8/13/2021 - 3/25/2023.     SACITUZUMAB q 2wk with Neulasta support:        5/19/2023 - 7/14/2023.        Resumed 8/30/2023 - 2/14/2024.  Progression following stable disease.    2.  Genetic Testing:  Genetic testing: AXIN2, RNF43 (VUS) identified.    3.  Vision loss L eye 2/28/2024.    TREATMENT PLAN:     Vision loss OS:  Consult Dr. Colbert 2/28/2024.    MRI Brain and orbits ordered 2/28/2024.     Faslodex pending   Abemaciclib/ribociclib pending    Discontinue trazodone with ribociclib.    Zometa q 4 wk:  8/2022 - .      Liquid biopsy:      PIK3CA mutation.  Pt receive Apelisib in the past.    PDL-1 <1%.    Restaging:   PET: 2/15/2022: stable osseous sclerotic lesions and pulmonary micro nodules  PET 6/6/2022: stable   PET 11/4/2022:stable  PET 3/20/2023: New derick metastasis (right hilar node). Otherwise stable  MRI sacrum/coccyx 3/29/2023: Extensive  osseous metastatic disease of the pelvis involving much of the sacrum.  No evidence for invasion of the sacral canal or neural foramina.  MRI L-spine 3/29/2023: Dffuse osseous metastatic disease throughout the lumbar spine.  PET 6/22/2023: Findings suggestive of disease progression, noting worsening osseous metastatic disease and subcarinal/right hilar lymphadenopathy.  PET 10/10/2023:  Stable except subcarinal LN increase per PET.  CT images are not convincing.  PET 2/27/2024:  Progression in intra-thoracic LN.        2. Malignant neoplasm metastatic to bone  Overview:  IMO Regualtory Update 4/1/23    Orders:  -     Discontinue: morphine (MS CONTIN) 30 MG 12 hr tablet; Take 1 tablet (30 mg total) by mouth 2 (two) times daily.  Dispense: 60 tablet; Refill: 0  -     morphine (MS CONTIN) 30 MG 12 hr tablet; Take 1 tablet (30 mg total) by mouth 2 (two) times daily.  Dispense: 60 tablet; Refill: 0    3. Neoplasm related pain  -     Discontinue: morphine (MS CONTIN) 30 MG 12 hr tablet; Take 1 tablet (30 mg total) by mouth 2 (two) times daily.  Dispense: 60 tablet; Refill: 0  -     morphine (MS CONTIN) 30 MG 12 hr tablet; Take 1 tablet (30 mg total) by mouth 2 (two) times daily.  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 Chemotherapy-induced neutropenia    6. Class 3 obesity    7. Anxiety associated with cancer diagnosis    8. Vision loss of left eye    9. Secondary malignant neoplasm of brain              Full chart review, note updated.   Refill pain medication- MS Contin 30mg BID.   Most recent labs reviewed.   Monitor anemia.   Well versed on neutropenic precautions.   Bowel regimen discussed.   Continue decadron and RT to left orbit  See Dr. Linn next week as scheduled to decide next steps.   Reassurance given. Continue f/u with Dr. Sandoval.       Route Chart for Scheduling    Med Onc Chart Routing      Follow up with physician . See Temitope next week as scheduled.   Follow up with  JAC    Infusion scheduling note    Injection scheduling note    Labs    Imaging    Pharmacy appointment    Other referrals                Urgent Care Oncology Visit    Date and Time: 04/02/2024 3:43 PM   Patient MRN: [id number]   Chief Complaint: No chief complaint on file.    Reason for Urgent Care Visit: pain  Patient Type: active chemotherapy/immunotherapy treatment  Intervention: prescriptions sent  Dispo: return to clinic as previous  UrgOnc appointment addressed via: [facility] message  This UrgOnc visit was virtual  Time spent handling UC issue:  40 minutes    Was this virtual or in person UrgOnc visit appropriate? Yes    Patient is in agreement with the proposed treatment plan. All questions were answered to the patient's satisfaction. Pt knows to call clinic for any new or worsening symptoms and if anything is needed before the next clinic visit.    Richard Keys, MSN, APRN, FNP-C  Nurse Practitioner to Dr. Rosa Linn  Lead [facility]  [facility]  Hematology & Medical Oncology  [address] [place] [id number]  ph. [phone] ext [phone]  Fax. [phone]

## 2024-04-04 ENCOUNTER — DOCUMENTATION ONLY (OUTPATIENT)
Dept: RADIATION THERAPY | Facility: HOSPITAL | Age: 49
End: 2024-04-04
Payer: MEDICARE

## 2024-04-04 PROCEDURE — 77412 RADIATION TX DELIVERY LVL 3: CPT | Performed by: RADIOLOGY

## 2024-04-04 PROCEDURE — 77387 GUIDANCE FOR RADJ TX DLVR: CPT | Mod: TC | Performed by: RADIOLOGY

## 2024-04-04 PROCEDURE — G6002 STEREOSCOPIC X-RAY GUIDANCE: HCPCS | Mod: 26,,, | Performed by: RADIOLOGY

## 2024-04-04 NOTE — PLAN OF CARE
Day 8 of outpatient radiation to brain. Denies vision problems. Tolerating treatment. Follow up appt made.

## 2024-04-08 PROCEDURE — G6002 STEREOSCOPIC X-RAY GUIDANCE: HCPCS | Mod: 26,,, | Performed by: RADIOLOGY

## 2024-04-08 PROCEDURE — 77387 GUIDANCE FOR RADJ TX DLVR: CPT | Mod: TC | Performed by: RADIOLOGY

## 2024-04-08 PROCEDURE — 77412 RADIATION TX DELIVERY LVL 3: CPT | Performed by: RADIOLOGY

## 2024-04-09 DIAGNOSIS — E11.65 UNCONTROLLED TYPE 2 DIABETES MELLITUS WITH HYPERGLYCEMIA: ICD-10-CM

## 2024-04-09 DIAGNOSIS — J06.9 UPPER RESPIRATORY TRACT INFECTION, UNSPECIFIED TYPE: Primary | ICD-10-CM

## 2024-04-09 DIAGNOSIS — F32.0 CURRENT MILD EPISODE OF MAJOR DEPRESSIVE DISORDER WITHOUT PRIOR EPISODE: ICD-10-CM

## 2024-04-09 PROCEDURE — 77387 GUIDANCE FOR RADJ TX DLVR: CPT | Mod: TC | Performed by: RADIOLOGY

## 2024-04-09 PROCEDURE — 77336 RADIATION PHYSICS CONSULT: CPT | Performed by: RADIOLOGY

## 2024-04-09 PROCEDURE — G6002 STEREOSCOPIC X-RAY GUIDANCE: HCPCS | Mod: 26,,, | Performed by: RADIOLOGY

## 2024-04-09 PROCEDURE — 77412 RADIATION TX DELIVERY LVL 3: CPT | Performed by: RADIOLOGY

## 2024-04-09 RX ORDER — AZITHROMYCIN 250 MG/1
TABLET, FILM COATED ORAL
Qty: 6 TABLET | Refills: 0 | Status: SHIPPED | OUTPATIENT
Start: 2024-04-09 | End: 2024-04-14

## 2024-04-09 RX ORDER — METFORMIN HYDROCHLORIDE 500 MG/1
1000 TABLET ORAL 2 TIMES DAILY WITH MEALS
Qty: 120 TABLET | Refills: 2 | Status: SHIPPED | OUTPATIENT
Start: 2024-04-09 | End: 2024-07-10

## 2024-04-09 RX ORDER — FLUOXETINE HYDROCHLORIDE 40 MG/1
80 CAPSULE ORAL DAILY
Qty: 60 CAPSULE | Refills: 0 | Status: SHIPPED | OUTPATIENT
Start: 2024-04-09 | End: 2024-05-09 | Stop reason: SDUPTHER

## 2024-04-15 ENCOUNTER — HOSPITAL ENCOUNTER (EMERGENCY)
Facility: HOSPITAL | Age: 49
Discharge: HOME | End: 2024-04-15
Attending: EMERGENCY MEDICINE
Payer: MEDICARE

## 2024-04-15 ENCOUNTER — OFFICE VISIT (OUTPATIENT)
Dept: HEMATOLOGY/ONCOLOGY | Facility: CLINIC | Age: 49
End: 2024-04-15
Payer: MEDICARE

## 2024-04-15 VITALS
DIASTOLIC BLOOD PRESSURE: 58 MMHG | HEART RATE: 80 BPM | WEIGHT: 302.94 LBS | SYSTOLIC BLOOD PRESSURE: 128 MMHG | BODY MASS INDEX: 40.15 KG/M2 | TEMPERATURE: 98 F | HEIGHT: 73 IN | OXYGEN SATURATION: 97 % | RESPIRATION RATE: 17 BRPM

## 2024-04-15 VITALS
HEART RATE: 78 BPM | OXYGEN SATURATION: 98 % | WEIGHT: 302 LBS | TEMPERATURE: 98 F | SYSTOLIC BLOOD PRESSURE: 116 MMHG | RESPIRATION RATE: 18 BRPM | BODY MASS INDEX: 39.84 KG/M2 | DIASTOLIC BLOOD PRESSURE: 61 MMHG

## 2024-04-15 DIAGNOSIS — C79.51 MALIGNANT NEOPLASM METASTATIC TO BONE: ICD-10-CM

## 2024-04-15 DIAGNOSIS — C79.31 SECONDARY MALIGNANT NEOPLASM OF BRAIN: ICD-10-CM

## 2024-04-15 DIAGNOSIS — G89.3 NEOPLASM RELATED PAIN: ICD-10-CM

## 2024-04-15 DIAGNOSIS — Z17.1 MALIGNANT NEOPLASM INVOLVING BOTH NIPPLE AND AREOLA OF RIGHT BREAST IN MALE, ESTROGEN RECEPTOR NEGATIVE: Primary | ICD-10-CM

## 2024-04-15 DIAGNOSIS — Z74.09 IMPAIRED FUNCTIONAL MOBILITY AND ACTIVITY TOLERANCE: ICD-10-CM

## 2024-04-15 DIAGNOSIS — M54.9 BACK PAIN, UNSPECIFIED BACK LOCATION, UNSPECIFIED BACK PAIN LATERALITY, UNSPECIFIED CHRONICITY: ICD-10-CM

## 2024-04-15 DIAGNOSIS — C50.021 MALIGNANT NEOPLASM INVOLVING BOTH NIPPLE AND AREOLA OF RIGHT BREAST IN MALE, ESTROGEN RECEPTOR NEGATIVE: Primary | ICD-10-CM

## 2024-04-15 DIAGNOSIS — C79.51 METASTATIC CANCER TO BONE: Primary | ICD-10-CM

## 2024-04-15 LAB
ALBUMIN SERPL BCP-MCNC: 2.5 G/DL (ref 3.5–5.2)
ALP SERPL-CCNC: 152 U/L (ref 55–135)
ALT SERPL W/O P-5'-P-CCNC: 16 U/L (ref 10–44)
ANION GAP SERPL CALC-SCNC: 9 MMOL/L (ref 8–16)
AST SERPL-CCNC: 27 U/L (ref 10–40)
BASOPHILS # BLD AUTO: 0.01 K/UL (ref 0–0.2)
BASOPHILS NFR BLD: 0.2 % (ref 0–1.9)
BILIRUB SERPL-MCNC: 0.4 MG/DL (ref 0.1–1)
BUN SERPL-MCNC: 18 MG/DL (ref 6–20)
CALCIUM SERPL-MCNC: 9.1 MG/DL (ref 8.7–10.5)
CHLORIDE SERPL-SCNC: 98 MMOL/L (ref 95–110)
CO2 SERPL-SCNC: 25 MMOL/L (ref 23–29)
CREAT SERPL-MCNC: 0.8 MG/DL (ref 0.5–1.4)
DIFFERENTIAL METHOD BLD: ABNORMAL
EOSINOPHIL # BLD AUTO: 0 K/UL (ref 0–0.5)
EOSINOPHIL NFR BLD: 0.2 % (ref 0–8)
ERYTHROCYTE [DISTWIDTH] IN BLOOD BY AUTOMATED COUNT: 20 % (ref 11.5–14.5)
EST. GFR  (NO RACE VARIABLE): >60 ML/MIN/1.73 M^2
GLUCOSE SERPL-MCNC: 75 MG/DL (ref 70–110)
HCT VFR BLD AUTO: 26.5 % (ref 40–54)
HGB BLD-MCNC: 8.2 G/DL (ref 14–18)
IMM GRANULOCYTES # BLD AUTO: 0.02 K/UL (ref 0–0.04)
IMM GRANULOCYTES NFR BLD AUTO: 0.5 % (ref 0–0.5)
LYMPHOCYTES # BLD AUTO: 1 K/UL (ref 1–4.8)
LYMPHOCYTES NFR BLD: 23 % (ref 18–48)
MAGNESIUM SERPL-MCNC: 1.8 MG/DL (ref 1.6–2.6)
MCH RBC QN AUTO: 23.4 PG (ref 27–31)
MCHC RBC AUTO-ENTMCNC: 30.9 G/DL (ref 32–36)
MCV RBC AUTO: 76 FL (ref 82–98)
MONOCYTES # BLD AUTO: 0.7 K/UL (ref 0.3–1)
MONOCYTES NFR BLD: 14.9 % (ref 4–15)
NEUTROPHILS # BLD AUTO: 2.7 K/UL (ref 1.8–7.7)
NEUTROPHILS NFR BLD: 61.2 % (ref 38–73)
NRBC BLD-RTO: 0 /100 WBC
OHS QRS DURATION: 96 MS
OHS QTC CALCULATION: 420 MS
PLATELET # BLD AUTO: 154 K/UL (ref 150–450)
PMV BLD AUTO: 9.9 FL (ref 9.2–12.9)
POTASSIUM SERPL-SCNC: 4 MMOL/L (ref 3.5–5.1)
PROT SERPL-MCNC: 7.3 G/DL (ref 6–8.4)
RBC # BLD AUTO: 3.5 M/UL (ref 4.6–6.2)
SODIUM SERPL-SCNC: 132 MMOL/L (ref 136–145)
WBC # BLD AUTO: 4.43 K/UL (ref 3.9–12.7)

## 2024-04-15 PROCEDURE — 3078F DIAST BP <80 MM HG: CPT | Mod: CPTII,S$GLB,, | Performed by: INTERNAL MEDICINE

## 2024-04-15 PROCEDURE — 83735 ASSAY OF MAGNESIUM: CPT | Performed by: EMERGENCY MEDICINE

## 2024-04-15 PROCEDURE — 3074F SYST BP LT 130 MM HG: CPT | Mod: CPTII,S$GLB,, | Performed by: INTERNAL MEDICINE

## 2024-04-15 PROCEDURE — 99215 OFFICE O/P EST HI 40 MIN: CPT | Mod: S$GLB,,, | Performed by: INTERNAL MEDICINE

## 2024-04-15 PROCEDURE — 25500020 PHARM REV CODE 255: Performed by: EMERGENCY MEDICINE

## 2024-04-15 PROCEDURE — 1159F MED LIST DOCD IN RCRD: CPT | Mod: CPTII,S$GLB,, | Performed by: INTERNAL MEDICINE

## 2024-04-15 PROCEDURE — 99999 PR PBB SHADOW E&M-EST. PATIENT-LVL V: CPT | Mod: PBBFAC,,, | Performed by: INTERNAL MEDICINE

## 2024-04-15 PROCEDURE — 1111F DSCHRG MED/CURRENT MED MERGE: CPT | Mod: CPTII,S$GLB,, | Performed by: INTERNAL MEDICINE

## 2024-04-15 PROCEDURE — 4010F ACE/ARB THERAPY RXD/TAKEN: CPT | Mod: CPTII,S$GLB,, | Performed by: INTERNAL MEDICINE

## 2024-04-15 PROCEDURE — 63600175 PHARM REV CODE 636 W HCPCS: Performed by: EMERGENCY MEDICINE

## 2024-04-15 PROCEDURE — 1160F RVW MEDS BY RX/DR IN RCRD: CPT | Mod: CPTII,S$GLB,, | Performed by: INTERNAL MEDICINE

## 2024-04-15 PROCEDURE — 99285 EMERGENCY DEPT VISIT HI MDM: CPT | Mod: 25

## 2024-04-15 PROCEDURE — 85025 COMPLETE CBC W/AUTO DIFF WBC: CPT | Performed by: EMERGENCY MEDICINE

## 2024-04-15 PROCEDURE — 25000003 PHARM REV CODE 250: Performed by: EMERGENCY MEDICINE

## 2024-04-15 PROCEDURE — A9585 GADOBUTROL INJECTION: HCPCS | Performed by: EMERGENCY MEDICINE

## 2024-04-15 PROCEDURE — 96374 THER/PROPH/DIAG INJ IV PUSH: CPT

## 2024-04-15 PROCEDURE — 3008F BODY MASS INDEX DOCD: CPT | Mod: CPTII,S$GLB,, | Performed by: INTERNAL MEDICINE

## 2024-04-15 PROCEDURE — 80053 COMPREHEN METABOLIC PANEL: CPT | Performed by: EMERGENCY MEDICINE

## 2024-04-15 RX ORDER — FLUOXETINE HYDROCHLORIDE 20 MG/1
40 CAPSULE ORAL ONCE
Status: COMPLETED | OUTPATIENT
Start: 2024-04-15 | End: 2024-04-15

## 2024-04-15 RX ORDER — HEPARIN 100 UNIT/ML
10 SYRINGE INTRAVENOUS
Status: DISCONTINUED | OUTPATIENT
Start: 2024-04-15 | End: 2024-04-16 | Stop reason: HOSPADM

## 2024-04-15 RX ORDER — MORPHINE SULFATE 15 MG/1
30 TABLET, FILM COATED, EXTENDED RELEASE ORAL ONCE
Status: COMPLETED | OUTPATIENT
Start: 2024-04-15 | End: 2024-04-15

## 2024-04-15 RX ORDER — GADOBUTROL 604.72 MG/ML
10 INJECTION INTRAVENOUS
Status: COMPLETED | OUTPATIENT
Start: 2024-04-15 | End: 2024-04-15

## 2024-04-15 RX ORDER — GABAPENTIN 300 MG/1
900 CAPSULE ORAL ONCE
Status: COMPLETED | OUTPATIENT
Start: 2024-04-15 | End: 2024-04-15

## 2024-04-15 RX ADMIN — HEPARIN 1000 UNITS: 100 SYRINGE at 10:04

## 2024-04-15 RX ADMIN — FLUOXETINE 40 MG: 20 CAPSULE ORAL at 06:04

## 2024-04-15 RX ADMIN — MORPHINE SULFATE 30 MG: 15 TABLET, EXTENDED RELEASE ORAL at 12:04

## 2024-04-15 RX ADMIN — GABAPENTIN 900 MG: 300 CAPSULE ORAL at 06:04

## 2024-04-15 RX ADMIN — GADOBUTROL 10 ML: 604.72 INJECTION INTRAVENOUS at 08:04

## 2024-04-15 NOTE — ED NOTES
"Central supply contacted at this time to follow up on central line kit delivery. [place] from central supply said "The  is heading down now with them".  "

## 2024-04-15 NOTE — ED TRIAGE NOTES
Paul Clintonshilpa Michael, a 48 y.o. male presents to the ED from the oncology clinic w/ complaint of lower back pain that began last Thursday 4/11. Pt has metastatic breast cancer, pt not on active chemo but just finished radiation yesterday. Pt denies fevers, chills, n/v, SOB or CP.    Triage note:  Chief Complaint   Patient presents with    Back Pain     Pt sent from oncology clinic for further evaluation + imaging related to back pain. Pt has breast cancer w/ mets to spine.     Review of patient's allergies indicates:  No Known Allergies  Past Medical History:   Diagnosis Date    Breast cancer     Cancer     R breast    Diabetes mellitus     HTN (hypertension)     Leg edema, right     Prediabetes     Seizures     at age 18 - stress related    Therapy     marital counseling

## 2024-04-15 NOTE — ED NOTES
Central  contacted for central line kits to access power port. Dr. Atwood aware of delay of access for port at this time.

## 2024-04-15 NOTE — PROGRESS NOTES
"Subjective     Patient ID: Paul Li Jr. is a 48 y.o. male.    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, BRCA1 +, progression  His most recent systemic treatment regimen was sacituzumab-govetican Q3w and he received last dose on 2/12/2024  Last PET there per notes revealed progression on 2/27/2024 and no systemic treatment since    Patient moved to [place] in June of 2023 and recently returned to [place] to re-establish care after sudden left eye blindness- he woke up that AM with acute of chronic vision loss, left orbital pain and exophthalmos  He was admitted on return from 3/20- 3/22/2024   In the ED a stroke code was activated and CTA demonstrated "Irregularity of the intracranial vertebral arteries and left PCA as above, likely atherosclerotic disease.  No high-grade stenosis or large vessel occlusion. Osseous metastatic disease similar to prior outside MRI." Opthalmology was consulted who recommended a orbital MRI which demonstrated " There is abnormal soft tissue lesion in the posterior left orbital apex/optic canal which is adjacent to or involving the inferior rectus and medial rectus extra-ocular muscles with associated enhancement, and could represent metastatic disease.  This abuts and compresses the posterior optic nerve at the posterior left orbital apex. There is minimal thickening and increased T2 signal with probable minimal enhancement of the anterior left optic nerve that could represent mild optic neuritis."     He has now completed XRT      He reports the following today:  Weight: 30 lb weight loss since June 2023, reports most occurred recently  Fatigue: Energy level poor - fatigue post XRT  Pain:  Reports a new pain for which pain medication not working  Taking: MS Contin 30 mg q 12 hours, Norco as needed 2-3 x per day  Last Thursday noted new pain at tailbone feels like a "muscle " "brianna"  Any movement leads to pain  + LE weakness  Using disposable diapers now  Denies incontinence-- states can't get to the bathroom in time with pain and weakness  Also limited in balance by vision  Denies numbness- states completed PT last Friday due to numbness in right leg- that area had been getting better until above- states no tingling noted now    Denies fevers or chills but reports 1 week of increased mucous production- he just completed a zpak, notes hard for him to cough due to the back pain  Using Mucinex    Vision:  left eye - upper notes figures, lower all cloudy but can see light  Prior to XRT had no vision other than cloudy   Exophthalmos improved, pain resolved     Most recent imaging:  - 2/27/2024 PET- not visible in records and will obtain    - 3/20/2024 MRI orbits:  FINDINGS:  Orbits/Optic Nerves:  Minimal thickening and increased T2 signal with probable minimal enhancement of the left optic nerve could represent mild optic neuritis.  No significant abnormality on the right.  Globes appear within normal limits.  Remainder of the Intracranial Compartment:  No midline shift or hydrocephalus.  No extra-axial blood or fluid collections.  The brain parenchyma appears normal. No cerebral mass lesion, acute hemorrhage, edema, or acute infarct.  There is abnormal soft tissue lesion in the posterior left orbital apex and optic canal, which is adjacent to or involving the inferior rectus and medial rectus extra-ocular muscles.  There is mild associated enhancement.  This could represent metastatic disease.  Minimal associated restricted diffusion on axial 35 of series 6.  This abuts and may compress the posterior optic nerve at the posterior left orbital apex.  Normal vascular flow voids are preserved.  Mild bilateral ethmoid and right maxillary sinus disease.  Skull/Extracranial Contents (limited evaluation): Bone marrow signal intensity is normal.  Impression:  1. There is abnormal soft tissue " lesion in the posterior left orbital apex/optic canal which is adjacent to or involving the inferior rectus and medial rectus extra-ocular muscles with associated enhancement, and could represent metastatic disease.  This abuts and compresses the posterior optic nerve at the posterior left orbital apex.  2. There is minimal thickening and increased T2 signal with probable minimal enhancement of the anterior left optic nerve that could represent mild optic neuritis.  Follow-up recommended.  3. Mild paranasal sinus disease.  4. This report was flagged in Epic as abnormal.        Oncology History  Malignant neoplasm involving both nipple and areola of right breast in male, estrogen receptor negative- initially Stage IB, now metastatic  Metastatic Triple Negative Breast Cancer: progressed from prior Stage IB   5/2/2019: Right breast retroareolar core biopsy  S/p Right breast mastectomy with axillary lymph node dissection in 2019  S/p neoadjuvant ddAC/carbo-taxol, adjuvant xeloda, XRT   Recurrence with bone metastasis in 8/2021, received abraxane and progressed on it in 3/2023  Started on Trodelvy in 5/2023 - ongoing  - His schedule of trodelvy is [id number] with growth factor support due to   neutropenia   Liquid biopsy: PIK3CA mutation  PDL 1 <1%  Precerted for Sacituzumab govitecan-hziy-for a transfer of facility  - PET CT 10/10/23:               - reviewed with previous images  - upon our review, exam is stable except subcarinal LN increase in size (max SUV of 10.9 measuring 2.4 x 2.7 cm, previously was 1.5cm with max SUV 10)  - MRI C spine from 12/15/23 reviewed, shows degenerative changes but no concerning metastatic lesions. Reports for lumbar spine and thoracic spine not uploaded, have requested from [facility]. Reviewed images, show known T6 lesion (seen on PET scan Oct 2023) but no other clear lesions.      1.  Metastatic Breast Cancer:              1.  Right Breast Cancer Stage IB (pT1b pN1):  T=5 mm, G=2, N=1/15,  ER=negative, NC=negative. [id number]: IHC 0, FISH=.  Ki67=80%.  MammaPrint=.  Date: 5/2/2019                          A.  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  Biopsy: 5/2/2019: Right breast retroareolar core biopsy                          C.  Staging Studies:                          D.  NeoAdjuvant Chemo:                                       ddAC x 4 / TaxolCarbo wkly x 12.   5/27/2019 - 10/15/2019                          E.  Surgery:                                      11/22/2019: R Mastectomy/SLN Dr Whyte: Right breast mastectomy with SLN biopsy no LVI with 1 LN positive 4 mm, negative margins.                                      12/17/2019:  ALND  0/14 positive for carcinoma.                          F.  XRT: 2/3/2020- 3/23/2020: Total Dose: 50.4 Gy to the chest wall and regional lymphatic                          G.  Adjuvant Chemo:                                        Xeloda 1000 mg/m2 bid days 1-14 every 21 day x 6.   4/15/2020 - 9/28/20                 2.  Recurrence:  7/2/2021.                          A.  Biopsy:  Left iliac bone biopsy: 7/2/2021: Positive for metastatic breast ca                          B.  Staging:                                      MRI T/L spine 6/3/2021:  Abnormal  T12, S1, S2 vertebral bodies, LEFT sacrum and ilium concerning for metastatic disease.                                       PET 6/9/2021: Multiple sclerotic lesions in the T12 vertebral body, sacrum, and left iliac bone with hypermetabolic activity concerning for active metastatic disease.                                      C.  Treatment:                                      Abraxane:  8/13/2021 - 3/25/2023.     2.  Genetic Testing:  Genetic testing: AXIN2, RNF43 (VUS) identified     TREATMENT PLAN:                 SACITUZUMAB q 2wk with Neulasta support:                            5/19/2023 - 7/14/2023.                            Resumed 8/30/2023 - .                  Zometa q 4 wkly a:  8/2022 - .                  Liquid biopsy:                            PIK3CA mutation                          PDL-1 <1%.     Restaging:   PET: 2/15/2022: stable osseous sclerotic lesions and pulmonary micro nodules  PET 6/6/2022: stable   PET 11/4/2022:stable  PET 3/20/2023: New derick metastasis (right hilar node). Otherwise stable  MRI sacrum/coccyx 3/29/2023: Extensive osseous metastatic disease of the pelvis involving much of the sacrum.  No evidence for invasion of the sacral canal or neural foramina.  MRI L-spine 3/29/2023: Dffuse osseous metastatic disease throughout the lumbar spine.  PET 6/22/2023: Findings suggestive of disease progression, noting worsening osseous metastatic disease and subcarinal/right hilar lymphadenopathy.  PET 10/10/2023:  Stable except subcarinal LN increase per PET.  CT images are not convincing.    Review of Systems   Constitutional:  Positive for activity change, appetite change, fatigue and unexpected weight change. Negative for chills and fever.   Eyes:  Positive for visual disturbance.   Respiratory:  Positive for cough, shortness of breath and wheezing.    Cardiovascular:  Positive for leg swelling. Negative for chest pain and palpitations.   Gastrointestinal:  Positive for change in bowel habit and constipation. Negative for abdominal distention, abdominal pain, diarrhea, nausea, vomiting and reflux.   Genitourinary:  Negative for bladder incontinence, decreased urine volume, difficulty urinating, frequency, hematuria and urgency.   Musculoskeletal:  Positive for arthralgias, back pain and leg pain.   Neurological:  Positive for dizziness, weakness and coordination difficulties. Negative for numbness, headaches and memory loss.   Hematological:  Negative for adenopathy. Does not bruise/bleed easily.   Psychiatric/Behavioral:  Positive for dysphoric mood and sleep disturbance. The patient is nervous/anxious.           Objective     Physical Exam  Vitals  and nursing note reviewed.   Constitutional:       General: He is in acute distress.      Appearance: He is ill-appearing.   HENT:      Head: Normocephalic and atraumatic.      Mouth/Throat:      Pharynx: No oropharyngeal exudate or posterior oropharyngeal erythema.   Eyes:      General: No scleral icterus.     Comments: Left eye visual change   Cardiovascular:      Rate and Rhythm: Normal rate and regular rhythm.      Heart sounds: No murmur heard.     No friction rub. No gallop.   Pulmonary:      Effort: Pulmonary effort is normal. No respiratory distress.      Breath sounds: Normal breath sounds. No wheezing, rhonchi or rales.   Abdominal:      General: Abdomen is flat. Bowel sounds are normal. There is no distension.      Palpations: Abdomen is soft. There is no mass.      Tenderness: There is no guarding.   Musculoskeletal:         General: Swelling present. No tenderness. Normal range of motion.      Cervical back: Normal range of motion and neck supple.      Right lower leg: Edema present.      Left lower leg: Edema present.   Skin:     General: Skin is warm and dry.      Coloration: Skin is not jaundiced or pale.   Neurological:      Mental Status: He is alert and oriented to person, place, and time.      Sensory: Sensory deficit present.      Motor: Weakness present.      Gait: Gait abnormal.      Comments: Decr strength bilateral LE   Psychiatric:         Behavior: Behavior normal.         Thought Content: Thought content normal.         Judgment: Judgment normal.          Assessment and Plan     1. Malignant neoplasm involving both nipple and areola of right breast in male, estrogen receptor negative  Overview:  1.  Metastatic Breast Cancer:   1.  Right Breast Cancer Stage IB (pT1b pN1):  T=5 mm, G=2, N=1/15, ER=negative, ME=negative. FND0gru: IHC 0, FISH=.  Ki67=80%.  MammaPrint=.  Date: 5/2/2019    A.  Histology: Invasive ductal carcinoma with lobular features    B.  Biopsy: 5/2/2019: Right breast  retroareolar core biopsy    C.  Staging Studies:    D.  NeoAdjuvant Chemo:      ddAC x 4 / TaxolCarbo wkly x 12.   5/27/2019 - 10/15/2019    E.  Surgery:     11/22/2019: R Mastectomy/SLN Dr Whyte: Right breast mastectomy with SLN biopsy no LVI with 1 LN positive 4 mm, negative margins.     12/17/2019:  ALND  0/14 positive for carcinoma.    F.  XRT: 2/3/2020- 3/23/2020: Total Dose: 50.4 Gy to the chest wall and regional lymphatic    G.  Adjuvant Chemo:       Xeloda 1000 mg/m2 bid days 1-14 every 21 day x 6.   4/15/2020 - 9/28/20     2.  Recurrence:  7/2/2021.    A.  Biopsy:  Left iliac bone biopsy: 7/2/2021: Positive for metastatic breast ca    B.  Staging:     MRI T/L spine 6/3/2021:  Abnormal  T12, S1, S2 vertebral bodies, LEFT sacrum and ilium concerning for metastatic disease.      PET 6/9/2021: Multiple sclerotic lesions in the T12 vertebral body, sacrum, and left iliac bone with hypermetabolic activity concerning for active metastatic disease.        C.  Treatment:     Abraxane:  8/13/2021 - 3/25/2023.     SACITUZUMAB q 2wk with Neulasta support:        5/19/2023 - 7/14/2023.        Resumed 8/30/2023 - 2/14/2024.  Progression following stable disease.    2.  Genetic Testing:  Genetic testing: AXIN2, RNF43 (VUS) identified.    3.  Vision loss L eye 2/28/2024.    TREATMENT PLAN:     Vision loss OS:  Consult Dr. Colbert 2/28/2024.    MRI Brain and orbits ordered 2/28/2024.     Faslodex pending   Abemaciclib/ribociclib pending    Discontinue trazodone with ribociclib.    Zometa q 4 wk:  8/2022 - .      Liquid biopsy:      PIK3CA mutation.  Pt receive Apelisib in the past.    PDL-1 <1%.    Restaging:   PET: 2/15/2022: stable osseous sclerotic lesions and pulmonary micro nodules  PET 6/6/2022: stable   PET 11/4/2022:stable  PET 3/20/2023: New derick metastasis (right hilar node). Otherwise stable  MRI sacrum/coccyx 3/29/2023: Extensive osseous metastatic disease of the pelvis involving much of the sacrum.  No  evidence for invasion of the sacral canal or neural foramina.  MRI L-spine 3/29/2023: Dffuse osseous metastatic disease throughout the lumbar spine.  PET 6/22/2023: Findings suggestive of disease progression, noting worsening osseous metastatic disease and subcarinal/right hilar lymphadenopathy.  PET 10/10/2023:  Stable except subcarinal LN increase per PET.  CT images are not convincing.  PET 2/27/2024:  Progression in intra-thoracic LN.        2. Bone metastases  Overview:  IMO Regualtory Update 4/1/23      3. Neoplasm related pain    4. Secondary malignant neoplasm of brain    5. Impaired functional mobility and activity tolerance    ED for MRI as concern for cord compression  Warren discussion that clinical findings need to be assessed emergently and see if Radiation Oncology can assist if new findings  We discussed systemic options have been exhausted and I will reassess if any options post above scans  Performance status also declinig    40 minutes total     Route Chart for Scheduling    Med Onc Chart Routing      Follow up with physician 1 week.   Follow up with [facility]    Infusion scheduling note    Injection scheduling note    Labs    Imaging    Pharmacy appointment    Other referrals            Treatment Plan Information   OP BREAST FULVESTRANT   Paul Durbin MD   Upcoming Treatment Dates - OP BREAST FULVESTRANT    3/6/2024       Chemotherapy       fulvestrant (FASLODEX) injection 500 mg  3/20/2024       Chemotherapy       fulvestrant (FASLODEX) injection 500 mg  4/3/2024       Chemotherapy       fulvestrant (FASLODEX) injection 500 mg  5/1/2024       Chemotherapy       fulvestrant (FASLODEX) injection 500 mg    Supportive Plan Information  OP FILGRASTIM 480 MCG   Micehlle Grayson, LAUREEN   Upcoming Treatment Dates - OP FILGRASTIM 480 MCG    No upcoming days in selected categories.    Therapy Plan Information  PORT FLUSH  Flushes  heparin, porcine (PF) 100 unit/mL injection flush 500 Units  500 Units,  Intravenous, Every visit  sodium chloride 0.9% flush 10 mL  10 mL, Intravenous, Every visit    ZOLEDRONIC ACID (ZOMETA) IV  Medications  zoledronic 4 mg/100 mL infusion 4 mg  4 mg, Intravenous, Every 4 weeks  Flushes  sodium chloride 0.9% 250 mL flush bag  Intravenous, Every 4 weeks  sodium chloride 0.9% flush 10 mL  10 mL, Intravenous, Every 4 weeks  heparin, porcine (PF) 100 unit/mL injection flush 500 Units  500 Units, Intravenous, Every 4 weeks  alteplase injection 2 mg  2 mg, Intra-Catheter, Every 4 weeks

## 2024-04-15 NOTE — ED PROVIDER NOTES
Encounter Date: 4/15/2024       History     Chief Complaint   Patient presents with    Back Pain     Pt sent from oncology clinic for further evaluation + imaging related to back pain. Pt has breast cancer w/ mets to spine.     48-year-old with PMH of HTN, DMII, malignant neoplasm presents to ED 4/15/24 on concerns of lower lumbar back pain. Patient has had history of malignant neoplasm with known mets to spine. Patient had earlier today presented to Dr. Linn, Hem/onc for check-up and concern was relayed that patient present to ED for MRI on concern for rule out for cord compression, cauda equina syndrome. Since last Thursday, he had experienced sudden onset lower extremity weakness which had affected his ability to perform his ADLs. He endorses pain sometimes when bending over or sneezing. He wears a diaper, but attributes it to being necessary due to the length of time that it takes to go to the restroom; states that he otherwise does not have any bladder or bowel incontinence.       The history is provided by the patient.     Review of patient's allergies indicates:  No Known Allergies  Past Medical History:   Diagnosis Date    Breast cancer     Cancer     R breast    Diabetes mellitus     HTN (hypertension)     Leg edema, right     Prediabetes     Seizures     at age 18 - stress related    Therapy     marital counseling     Past Surgical History:   Procedure Laterality Date    AXILLARY NODE DISSECTION Right 11/22/2019    Procedure: LYMPHADENECTOMY, AXILLARY RIGHT;  Surgeon: Shima Whyte MD;  Location: [facility];  Service: General;  Laterality: Right;    AXILLARY NODE DISSECTION Right 12/17/2019    Procedure: LYMPHADENECTOMY, AXILLARY;  Surgeon: Shima Whyte MD;  Location: [facility] OR [address] [place] [address];  Service: General;  Laterality: Right;    BREAST BIOPSY      EXCISION OF HYDROCELE Right 10/28/2022    Procedure: HYDROCELECTOMY;  Surgeon: Anthony Cordero Jr., MD;  Location: [facility] OR [address] [place] [address];  Service: Urology;  Laterality:  Right;  1 hour    INSERTION OF TUNNELED CENTRAL VENOUS CATHETER (CVC) WITH SUBCUTANEOUS PORT Left 2019    Procedure: [id number];  Surgeon: Shima Whyte MD;  [facility];  Service: General;  Laterality: Left;    INSERTION OF TUNNELED CENTRAL VENOUS CATHETER (CVC) WITH SUBCUTANEOUS PORT Left 2021    Procedure: INSERTION, SINGLE LUMEN CATHETER WITH PORT, WITH FLUOROSCOPIC GUIDANCE, right;  Surgeon: Gloria Holliday MD;  Location: [facility] OR 2ND FLR;  Service: General;  Laterality: Left;  rapid covid test    SENTINEL LYMPH NODE BIOPSY Right 2019    Procedure: BIOPSY, LYMPH NODE, SENTINEL RIGHT;  Surgeon: Shima Whyte MD;  Location: [facility];  Service: General;  Laterality: Right;    SIMPLE MASTECTOMY Right 2019    Procedure: MASTECTOMY, SIMPLE RIGHT (CONSENT AM OF) 2.5 hr case;  Surgeon: Shima Whyte MD;  Location: [facility];  Service: General;  Laterality: Right;     Family History   Problem Relation Name Age of Onset    Hypertension Mother      Diabetes Mother      Hypertension Father      Lupus Father      Post-traumatic stress disorder Brother      No Known Problems Maternal Grandmother      Colon cancer Maternal Grandfather      Breast cancer Paternal Grandmother      No Known Problems Paternal Grandfather      No Known Problems Sister      No Known Problems Maternal Aunt      No Known Problems Maternal Uncle      Fibromyalgia Paternal Aunt      Lupus Paternal Aunt      No Known Problems Paternal Uncle      No Known Problems Brother      No Known Problems Sister      No Known Problems Son      No Known Problems Son      No Known Problems Son      No Known Problems Son       Social History     Tobacco Use    Smoking status: Former     Current packs/day: 0.00     Average packs/day: 0.3 packs/day for 15.0 years (3.8 ttl pk-yrs)     Types: Cigarettes, Vaping with nicotine     Start date: 1999     Quit date: 2014     Years since quittin.4    Smokeless  tobacco: Never    Tobacco comments:     Social smoker with alcohol    Substance Use Topics    Alcohol use: Yes     Alcohol/week: 0.0 standard drinks of alcohol     Comment: Rarely    Drug use: Not Currently     Types: Marijuana     Review of Systems   Constitutional:  Positive for activity change. Negative for chills and fever.   Respiratory:  Negative for cough and shortness of breath.    Cardiovascular:  Positive for leg swelling.   Gastrointestinal:  Negative for diarrhea, nausea and vomiting.   Musculoskeletal:  Positive for back pain and gait problem.   Skin:  Negative for wound.   Neurological:  Positive for weakness and numbness. Negative for dizziness, tremors and headaches.       Physical Exam     Initial Vitals [04/15/24 1003]   BP Pulse Resp Temp SpO2   124/61 87 18 98 °F (36.7 °C) 97 %      MAP       --         Physical Exam    HENT:   Head: Normocephalic and atraumatic.   Eyes: EOM are normal.   Abdominal: Abdomen is soft. Bowel sounds are normal.   Musculoskeletal:         General: Edema present.     Neurological: He is alert and oriented to person, place, and time. A sensory deficit is present.   +patellar, +achilles reflex   Psychiatric: He has a normal mood and affect. Thought content normal.         ED Course   Procedures  Labs Reviewed   CBC W/ AUTO DIFFERENTIAL - Abnormal; Notable for the following components:       Result Value    RBC 3.50 (*)     Hemoglobin 8.2 (*)     Hematocrit 26.5 (*)     MCV 76 (*)     MCH 23.4 (*)     MCHC 30.9 (*)     RDW 20.0 (*)     All other components within normal limits   COMPREHENSIVE METABOLIC PANEL - Abnormal; Notable for the following components:    Sodium 132 (*)     Albumin 2.5 (*)     Alkaline Phosphatase 152 (*)     All other components within normal limits   MAGNESIUM          Imaging Results               MRI Lumbar Spine W WO Cont (Final result)  Result time 04/15/24 21:15:37      Final result by Bogdan Bustos MD (04/15/24 21:15:37)                    Impression:      Persistent marrow replacement suggesting primary/metastatic bone disease    No enhancing lesions involving the T11-T12 and L1 vertebral body and posterior lamina and spinous processes.  This region is likely better evaluated on dedicated MRI of the thoracic spine if clinically warranted.  No definite cord compression is imaged.    Stable MR of the sacrum and visualized adjacent pelvic bones with no cortical disruption, new enhancement or nerve root compression.    This report was flagged in Epic as abnormal.      Electronically signed by: Bogdan Bustos  Date:    04/15/2024  Time:    21:15               Narrative:    EXAMINATION:  MRI LUMBAR SPINE W WO CONTRAST; MRI SACRUM/COCCYX (BONY) W WO CONTRAST    CLINICAL HISTORY:  Bone mass or bone pain, lumbar spine, aggressive features on xray;known mets to bone, lower lumbar and sacral/coccyx;; known mets to bone, lower lumbar and sacral/coccyx;    TECHNIQUE:  Multiplanar multisequence MR imaging of the lumbar spine and sacrum were performed both prior to and following the administration of 10 cc of Gadavist gadolinium intravenously.    COMPARISON:  None    FINDINGS:  Vertebrae: The fatty marrow appears replaced on all pulse sequences.  On STIR images there are areas of increased signal intensity in the T11 and T12 vertebral body which extend into the lamina and into the spinous processes especially of T11 incompletely imaged on MR of the lumbar spine.  These regions demonstrate significant enhancement.  Additionally, areas of increased signal and enhancement are noted in the lamina of L1 right more than left extending into the spinous process region.  No fracture.  No epidural extension.    Discs: Continued desiccation and mild height loss of L4-5.  .  Remaining discs appear normal in height and signal.  No new disc protrusion.    Cord: Stable and normal.  Conus terminates at L2    Degenerative findings:    T12-L1: No spinal canal stenosis or  neural foraminal narrowing.    L1-L2: No spinal canal stenosis or neural foraminal narrowing.    L2-L3: Mild facet arthropathy.  No spinal canal stenosis or neural foraminal narrowing.    L3-L4: Mild facet arthropathy.  No spinal canal stenosis or neural foraminal narrowing.    L4-L5: Circumferential disc bulge and moderate facet arthropathy.  No spinal canal stenosis or neural foraminal narrowing.    L5-S1: Mild facet arthropathy.  No spinal canal stenosis or neural foraminal narrowing.    Paraspinal muscles & soft tissues: Mild paraspinal muscle atrophy.    Marrow replacement of the sacrum and iliac bones is noted with heterogeneous enhancement.  Again no cortical disruption or invasion of the central neural canal or neural foramen is evident.  Muscles within the pelvis appear stable and symmetric.                                        MRI Sacrum/Coccyx (Bony) W W/O Contrast (Final result)  Result time 04/15/24 21:15:37      Final result by Bogdan Bustos MD (04/15/24 21:15:37)                   Impression:      Persistent marrow replacement suggesting primary/metastatic bone disease    No enhancing lesions involving the T11-T12 and L1 vertebral body and posterior lamina and spinous processes.  This region is likely better evaluated on dedicated MRI of the thoracic spine if clinically warranted.  No definite cord compression is imaged.    Stable MR of the sacrum and visualized adjacent pelvic bones with no cortical disruption, new enhancement or nerve root compression.    This report was flagged in Epic as abnormal.      Electronically signed by: Bogdan Bustos  Date:    04/15/2024  Time:    21:15               Narrative:    EXAMINATION:  MRI LUMBAR SPINE W WO CONTRAST; MRI SACRUM/COCCYX (BONY) W WO CONTRAST    CLINICAL HISTORY:  Bone mass or bone pain, lumbar spine, aggressive features on xray;known mets to bone, lower lumbar and sacral/coccyx;; known mets to bone, lower lumbar and  sacral/coccyx;    TECHNIQUE:  Multiplanar multisequence MR imaging of the lumbar spine and sacrum were performed both prior to and following the administration of 10 cc of Gadavist gadolinium intravenously.    COMPARISON:  None    FINDINGS:  Vertebrae: The fatty marrow appears replaced on all pulse sequences.  On STIR images there are areas of increased signal intensity in the T11 and T12 vertebral body which extend into the lamina and into the spinous processes especially of T11 incompletely imaged on MR of the lumbar spine.  These regions demonstrate significant enhancement.  Additionally, areas of increased signal and enhancement are noted in the lamina of L1 right more than left extending into the spinous process region.  No fracture.  No epidural extension.    Discs: Continued desiccation and mild height loss of L4-5.  .  Remaining discs appear normal in height and signal.  No new disc protrusion.    Cord: Stable and normal.  Conus terminates at L2    Degenerative findings:    T12-L1: No spinal canal stenosis or neural foraminal narrowing.    L1-L2: No spinal canal stenosis or neural foraminal narrowing.    L2-L3: Mild facet arthropathy.  No spinal canal stenosis or neural foraminal narrowing.    L3-L4: Mild facet arthropathy.  No spinal canal stenosis or neural foraminal narrowing.    L4-L5: Circumferential disc bulge and moderate facet arthropathy.  No spinal canal stenosis or neural foraminal narrowing.    L5-S1: Mild facet arthropathy.  No spinal canal stenosis or neural foraminal narrowing.    Paraspinal muscles & soft tissues: Mild paraspinal muscle atrophy.    Marrow replacement of the sacrum and iliac bones is noted with heterogeneous enhancement.  Again no cortical disruption or invasion of the central neural canal or neural foramen is evident.  Muscles within the pelvis appear stable and symmetric.                                       Medications   morphine 12 hr tablet 30 mg (30 mg Oral Given  4/15/24 1210)   gabapentin capsule 900 mg (900 mg Oral Given 4/15/24 1802)   FLUoxetine capsule 40 mg (40 mg Oral Given 4/15/24 1802)   gadobutroL (GADAVIST) injection 10 mL (10 mLs Intravenous Given 4/15/24 2025)     Medical Decision Making  48-year-old male with history of malignant neoplasm with known mets to spine. MRI lumbar/sacrum/coccyx ordered to rule out progression of known mets, epidural extension. CBC, CMP, mag ordered showing diminished Na 132, diminished H/h. Morphine tablet given for pain control.     MRI lumbar and pelvis/sacrum W/W/O read pending at the time of sign out. Anticipate DC if no evidence of acute cord impingement. Case s/o to oncoming EM team.     Amount and/or Complexity of Data Reviewed  External Data Reviewed: radiology.     Details: MR spine 3/29/23 showing diffuse osseous metastatic disease throughout the lumbar spine, sacrum with no evidence of invasion of sacral canal or neural foramina.   Labs: ordered.  Radiology: ordered.    Risk  Prescription drug management.                                      Clinical Impression:  Final diagnoses:  [C79.51] Metastatic cancer to bone (Primary)  [M54.9] Back pain, unspecified back location, unspecified back pain laterality, unspecified chronicity          ED Disposition Condition    Discharge Stable          ED Prescriptions    None       Follow-up Information       Follow up With Specialties Details Why Contact Info    Kareem Arroyo MD Family Medicine   [address] [place] [address]  [phone]               Catracho Atwood MD  04/17/24 0995

## 2024-04-16 ENCOUNTER — PATIENT MESSAGE (OUTPATIENT)
Dept: HEMATOLOGY/ONCOLOGY | Facility: CLINIC | Age: 49
End: 2024-04-16
Payer: MEDICARE

## 2024-04-16 ENCOUNTER — PATIENT OUTREACH (OUTPATIENT)
Dept: EMERGENCY MEDICINE | Facility: HOSPITAL | Age: 49
End: 2024-04-16
Payer: MEDICARE

## 2024-04-16 NOTE — PROGRESS NOTES
Call placed to Pt to f/u from recent ED visit. No answer x 2 attempts. Encounter to be closed at this time.

## 2024-04-16 NOTE — PROVIDER PROGRESS NOTES - EMERGENCY DEPT.
Imaging Results               MRI Lumbar Spine W WO Cont (Final result)  Result time 04/15/24 21:15:37      Final result by Bogdan Bustos MD (04/15/24 21:15:37)                   Impression:      Persistent marrow replacement suggesting primary/metastatic bone disease    No enhancing lesions involving the T11-T12 and L1 vertebral body and posterior lamina and spinous processes.  This region is likely better evaluated on dedicated MRI of the thoracic spine if clinically warranted.  No definite cord compression is imaged.    Stable MR of the sacrum and visualized adjacent pelvic bones with no cortical disruption, new enhancement or nerve root compression.    This report was flagged in Epic as abnormal.      Electronically signed by: Bogdan Bustos  Date:    04/15/2024  Time:    21:15               Narrative:    EXAMINATION:  MRI LUMBAR SPINE W WO CONTRAST; MRI SACRUM/COCCYX (BONY) W WO CONTRAST    CLINICAL HISTORY:  Bone mass or bone pain, lumbar spine, aggressive features on xray;known mets to bone, lower lumbar and sacral/coccyx;; known mets to bone, lower lumbar and sacral/coccyx;    TECHNIQUE:  Multiplanar multisequence MR imaging of the lumbar spine and sacrum were performed both prior to and following the administration of 10 cc of Gadavist gadolinium intravenously.    COMPARISON:  None    FINDINGS:  Vertebrae: The fatty marrow appears replaced on all pulse sequences.  On STIR images there are areas of increased signal intensity in the T11 and T12 vertebral body which extend into the lamina and into the spinous processes especially of T11 incompletely imaged on MR of the lumbar spine.  These regions demonstrate significant enhancement.  Additionally, areas of increased signal and enhancement are noted in the lamina of L1 right more than left extending into the spinous process region.  No fracture.  No epidural extension.    Discs: Continued desiccation and mild height loss of L4-5.  .  Remaining discs  appear normal in height and signal.  No new disc protrusion.    Cord: Stable and normal.  Conus terminates at L2    Degenerative findings:    T12-L1: No spinal canal stenosis or neural foraminal narrowing.    L1-L2: No spinal canal stenosis or neural foraminal narrowing.    L2-L3: Mild facet arthropathy.  No spinal canal stenosis or neural foraminal narrowing.    L3-L4: Mild facet arthropathy.  No spinal canal stenosis or neural foraminal narrowing.    L4-L5: Circumferential disc bulge and moderate facet arthropathy.  No spinal canal stenosis or neural foraminal narrowing.    L5-S1: Mild facet arthropathy.  No spinal canal stenosis or neural foraminal narrowing.    Paraspinal muscles & soft tissues: Mild paraspinal muscle atrophy.    Marrow replacement of the sacrum and iliac bones is noted with heterogeneous enhancement.  Again no cortical disruption or invasion of the central neural canal or neural foramen is evident.  Muscles within the pelvis appear stable and symmetric.                                        MRI Sacrum/Coccyx (Bony) W W/O Contrast (Final result)  Result time 04/15/24 21:15:37      Final result by Bogdan Bustos MD (04/15/24 21:15:37)                   Impression:      Persistent marrow replacement suggesting primary/metastatic bone disease    No enhancing lesions involving the T11-T12 and L1 vertebral body and posterior lamina and spinous processes.  This region is likely better evaluated on dedicated MRI of the thoracic spine if clinically warranted.  No definite cord compression is imaged.    Stable MR of the sacrum and visualized adjacent pelvic bones with no cortical disruption, new enhancement or nerve root compression.    This report was flagged in Epic as abnormal.      Electronically signed by: Bogdan Bustos  Date:    04/15/2024  Time:    21:15               Narrative:    EXAMINATION:  MRI LUMBAR SPINE W WO CONTRAST; MRI SACRUM/COCCYX (BONY) W WO CONTRAST    CLINICAL  HISTORY:  Bone mass or bone pain, lumbar spine, aggressive features on xray;known mets to bone, lower lumbar and sacral/coccyx;; known mets to bone, lower lumbar and sacral/coccyx;    TECHNIQUE:  Multiplanar multisequence MR imaging of the lumbar spine and sacrum were performed both prior to and following the administration of 10 cc of Gadavist gadolinium intravenously.    COMPARISON:  None    FINDINGS:  Vertebrae: The fatty marrow appears replaced on all pulse sequences.  On STIR images there are areas of increased signal intensity in the T11 and T12 vertebral body which extend into the lamina and into the spinous processes especially of T11 incompletely imaged on MR of the lumbar spine.  These regions demonstrate significant enhancement.  Additionally, areas of increased signal and enhancement are noted in the lamina of L1 right more than left extending into the spinous process region.  No fracture.  No epidural extension.    Discs: Continued desiccation and mild height loss of L4-5.  .  Remaining discs appear normal in height and signal.  No new disc protrusion.    Cord: Stable and normal.  Conus terminates at L2    Degenerative findings:    T12-L1: No spinal canal stenosis or neural foraminal narrowing.    L1-L2: No spinal canal stenosis or neural foraminal narrowing.    L2-L3: Mild facet arthropathy.  No spinal canal stenosis or neural foraminal narrowing.    L3-L4: Mild facet arthropathy.  No spinal canal stenosis or neural foraminal narrowing.    L4-L5: Circumferential disc bulge and moderate facet arthropathy.  No spinal canal stenosis or neural foraminal narrowing.    L5-S1: Mild facet arthropathy.  No spinal canal stenosis or neural foraminal narrowing.    Paraspinal muscles & soft tissues: Mild paraspinal muscle atrophy.    Marrow replacement of the sacrum and iliac bones is noted with heterogeneous enhancement.  Again no cortical disruption or invasion of the central neural canal or neural foramen is  evident.  Muscles within the pelvis appear stable and symmetric.                                       Patient signed out pending MRI studies r/o cord compression. MRI negative for cord compression or other acute cord abnormality. He has persistent marrow replacement with known metastatic disease. T11-12 has increased signal of the vertebral body but no epidural extension. Patient was offered admission for further pain control and possible rad/onc intervention but declined at this time. He reports he has been ambulatory with a steady gait and is not concerned about falls at home. He believes his increase in pain is likely due to stopping his steroids recently. He was encouraged to call his heme/onc physician in the AM for further pain control and options. Strict return precautions were discussed, and patient and wife are agreeable with the plan.

## 2024-04-16 NOTE — DISCHARGE INSTRUCTIONS
Take your pain medication as prescribed. Follow up with your heme/onc doctor. Return to the ER with any worsening symptoms.     When do I need to get emergency help?   Return to the ED if:   You are unable to walk or cannot control your bowels or bladder.  You develop a fever of 100.4°F (38°C) or higher, chills, or night sweats  When do I need to call the doctor?   Your legs are numb, weak, or tingly.  Your pain is getting worse, even with medicines and rest.  You feel weak and lightheaded.  You develop any of the following:  Belly pain.  Throwing up.  Pain with urination or need to urinate more often.  Vaginal pain or discharge.  Rash.  You have new or worsening symptoms.

## 2024-04-18 ENCOUNTER — PATIENT MESSAGE (OUTPATIENT)
Dept: HEMATOLOGY/ONCOLOGY | Facility: CLINIC | Age: 49
End: 2024-04-18
Payer: MEDICARE

## 2024-04-23 ENCOUNTER — OFFICE VISIT (OUTPATIENT)
Dept: PSYCHIATRY | Facility: CLINIC | Age: 49
End: 2024-04-23
Payer: COMMERCIAL

## 2024-04-23 DIAGNOSIS — F41.1 ANXIETY ASSOCIATED WITH CANCER DIAGNOSIS: Primary | ICD-10-CM

## 2024-04-23 DIAGNOSIS — C80.1 ANXIETY ASSOCIATED WITH CANCER DIAGNOSIS: Primary | ICD-10-CM

## 2024-04-23 PROCEDURE — 90834 PSYTX W PT 45 MINUTES: CPT | Mod: S$GLB,,, | Performed by: PSYCHOLOGIST

## 2024-04-23 PROCEDURE — 99999 PR PBB SHADOW E&M-EST. PATIENT-LVL I: CPT | Mod: PBBFAC,,, | Performed by: PSYCHOLOGIST

## 2024-04-23 PROCEDURE — 1160F RVW MEDS BY RX/DR IN RCRD: CPT | Mod: CPTII,S$GLB,, | Performed by: PSYCHOLOGIST

## 2024-04-23 PROCEDURE — 1159F MED LIST DOCD IN RCRD: CPT | Mod: CPTII,S$GLB,, | Performed by: PSYCHOLOGIST

## 2024-04-23 PROCEDURE — 4010F ACE/ARB THERAPY RXD/TAKEN: CPT | Mod: CPTII,S$GLB,, | Performed by: PSYCHOLOGIST

## 2024-04-23 NOTE — PROGRESS NOTES
"  PSYCHO-ONCOLOGY NOTE/ Individual Psychotherapy     Date: 4/23/2024   Site:  [facility]         Therapeutic Intervention: Met with patient.  Outpatient - Behavior modifying psychotherapy 45 min - CPT code 49179    Referring provider:  Kute intially, now [place]    Chief complaint/reason for encounter: Met with patient to evaluate psychosocial adaptation to survivorship of metastatic breast cancer  The patient's last visit with me was on 3/27/2024.    Objective:    Paul Li Jr. arrived promptly for the session.  Mr. Li was independently ambulatory at the time of session. The patient was fully cooperative throughout the session.   Appearance: age appropriate, casually  dressed, well groomed  Behavior/Cooperation: friendly and cooperative  Speech: normal in rate, volume, and tone and appropriate quality, quantity and organization of sentences  Mood: euthymic  Affect: mood congruent, full range and appropriate  Thought Process: goal-directed, logical  Thought Content: normal,  No delusions or paranoia; did not appear to be responding to internal stimuli during the session  Orientation: grossly intact  Memory: Grossly intact  Attention Span/Concentration: Attends to session without distraction; reports no difficulty  Fund of Knowledge: average  Estimate of Intelligence: average from verbal skills and history  Cognition: grossly intact  Insight: patient has awareness of illness; adequate insight into own behavior and behavior of others  Judgment: the patient's behavior is adequate to circumstances      Interval history and content of current session: Patient discussed emotional response to recent progression of disease and likely lack of additional treatment options. He feels he is "coming to terms" with this changed outlook but can "remain hopeful" for potential clinical trials or at least appropriate symptom management. His main complaint, at present, is nerve pain from his lower back. It has improved " from how he felt last week. (He is sleeping and moving differently to try to prevent aggravating the area.) His vision improved with radiation.  Discussed patient's response to illness and family response to his illness. It was very difficult to discuss his new status with his wife and children and his wife did not cope well. Visit with Dr. Ndiaye was helpful.  He has resolved the strain with one of his friends (friend/partner was offended that he did a deal outside their partnership). He has reconnected with his guadal[employer] community and is spending more time in [employer] studying.    Sleep: improved (1 night disrupted sleep in past 2 weeks)       Risk parameters:   Patient reports no suicidal ideation  Patient reports no homicidal ideation  Patient reports no self-injurious behavior  Patient reports no violent behavior   Safety needs:  None at this time      Verbal deficits: None     Patient's response to intervention:The patient's response to intervention is accepting, motivated.     Progress toward goals and other mental status changes:  The patient's progress toward goals is good.      Progress to date:Progress as Expected      Goals from last visit: Met      Patient reported outcomes:      Distress Thermometer:   Distress Score    Distress Score: 0 - No Distress        Practical Problems Physical Problems                                                   Family Problems                                         Emotional Problems                                                         Spiritual/Religions Concerns               Other Problems              PHQ-9= 2; Initial visit: 8       BILL-7=2; Initial visit: 8         10/17/2022     9:38 AM   GAD7   1. Feeling nervous, anxious, or on edge?    2. Not being able to stop or control worrying?    3. Worrying too much about different things?    4. Trouble relaxing?    5. Being so restless that it is hard to sit still?    6. Becoming easily annoyed or irritable?    7.  Feeling afraid as if something awful might happen?    8. If you checked off any problems, how difficult have these problems made it for you to do your work, take care of things at home, or get along with other people?    BILL-7 Score        Information is confidential and restricted. Go to Review Flowsheets to unlock data.         Client Strengths: verbal, intelligent, successful, good social support, commitment to wellness, strong guadalupe, strong cultural traditions      Treatment Plan:individual psychotherapy  Target symptoms: adjustment  Why chosen therapy is appropriate versus another modality: relevant to diagnosis, patient responds to this modality, evidence based practice  Outcome monitoring methods: self-report, observation, checklist/rating scale  Therapeutic intervention type: behavior modifying psychotherapy  Prognosis: Good      Behavioral goals:    Exercise:walking, PT exercises   Stress management:     Social engagement:    Nutrition:    Smoking Cessation:   Therapy: sleep tracking    Wife with PsychOnc for temporary support (transition to psychiatry asap)    Return to clinic: as needed     Length of Service (minutes direct face-to-face contact): 45      ICD-10-CM ICD-9-CM   1. Anxiety associated with cancer diagnosis  F41.1 300.09    C80.1          Matthew Sandoval, PhD  [place] License #[id number]

## 2024-04-24 ENCOUNTER — EXTERNAL HOME HEALTH (OUTPATIENT)
Dept: HOME HEALTH SERVICES | Facility: HOSPITAL | Age: 49
End: 2024-04-24
Payer: MEDICARE

## 2024-04-25 ENCOUNTER — OFFICE VISIT (OUTPATIENT)
Dept: HEMATOLOGY/ONCOLOGY | Facility: CLINIC | Age: 49
End: 2024-04-25
Payer: MEDICARE

## 2024-04-25 VITALS
DIASTOLIC BLOOD PRESSURE: 59 MMHG | WEIGHT: 295.88 LBS | OXYGEN SATURATION: 98 % | TEMPERATURE: 97 F | HEIGHT: 73 IN | RESPIRATION RATE: 17 BRPM | BODY MASS INDEX: 39.21 KG/M2 | HEART RATE: 76 BPM | SYSTOLIC BLOOD PRESSURE: 116 MMHG

## 2024-04-25 DIAGNOSIS — Z17.1 MALIGNANT NEOPLASM INVOLVING BOTH NIPPLE AND AREOLA OF RIGHT BREAST IN MALE, ESTROGEN RECEPTOR NEGATIVE: Primary | ICD-10-CM

## 2024-04-25 DIAGNOSIS — C79.31 SECONDARY MALIGNANT NEOPLASM OF BRAIN: ICD-10-CM

## 2024-04-25 DIAGNOSIS — C79.51 MALIGNANT NEOPLASM METASTATIC TO BONE: ICD-10-CM

## 2024-04-25 DIAGNOSIS — C50.021 MALIGNANT NEOPLASM INVOLVING BOTH NIPPLE AND AREOLA OF RIGHT BREAST IN MALE, ESTROGEN RECEPTOR NEGATIVE: Primary | ICD-10-CM

## 2024-04-25 PROCEDURE — 1159F MED LIST DOCD IN RCRD: CPT | Mod: CPTII,S$GLB,, | Performed by: INTERNAL MEDICINE

## 2024-04-25 PROCEDURE — 1160F RVW MEDS BY RX/DR IN RCRD: CPT | Mod: CPTII,S$GLB,, | Performed by: INTERNAL MEDICINE

## 2024-04-25 PROCEDURE — 99999 PR PBB SHADOW E&M-EST. PATIENT-LVL V: CPT | Mod: PBBFAC,,, | Performed by: INTERNAL MEDICINE

## 2024-04-25 PROCEDURE — 3074F SYST BP LT 130 MM HG: CPT | Mod: CPTII,S$GLB,, | Performed by: INTERNAL MEDICINE

## 2024-04-25 PROCEDURE — 3008F BODY MASS INDEX DOCD: CPT | Mod: CPTII,S$GLB,, | Performed by: INTERNAL MEDICINE

## 2024-04-25 PROCEDURE — 99215 OFFICE O/P EST HI 40 MIN: CPT | Mod: S$GLB,,, | Performed by: INTERNAL MEDICINE

## 2024-04-25 PROCEDURE — 4010F ACE/ARB THERAPY RXD/TAKEN: CPT | Mod: CPTII,S$GLB,, | Performed by: INTERNAL MEDICINE

## 2024-04-25 PROCEDURE — 3078F DIAST BP <80 MM HG: CPT | Mod: CPTII,S$GLB,, | Performed by: INTERNAL MEDICINE

## 2024-04-25 RX ORDER — HEPARIN 100 UNIT/ML
500 SYRINGE INTRAVENOUS
OUTPATIENT
Start: 2024-04-25

## 2024-04-25 RX ORDER — SODIUM CHLORIDE 0.9 % (FLUSH) 0.9 %
10 SYRINGE (ML) INJECTION
OUTPATIENT
Start: 2024-04-25

## 2024-04-25 NOTE — PROGRESS NOTES
"Subjective     Patient ID: Paul Li Jr. is a 48 y.o. male.    Chief Complaint: No chief complaint on file.    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, BRCA1 +, progression  His most recent systemic treatment regimen was sacituzumab-govetican Q3w and he received last dose on 2/12/2024  Last PET there per notes revealed progression on 2/27/2024 and no systemic treatment since     Patient moved to [place] in June of 2023 and recently returned to [place] to re-establish care after sudden left eye blindness- he woke up that AM with acute of chronic vision loss, left orbital pain and exophthalmos  He was admitted on return from 3/20- 3/22/2024   In the ED a stroke code was activated and CTA demonstrated "Irregularity of the intracranial vertebral arteries and left PCA as above, likely atherosclerotic disease.  No high-grade stenosis or large vessel occlusion. Osseous metastatic disease similar to prior outside MRI." Opthalmology was consulted who recommended a orbital MRI which demonstrated " There is abnormal soft tissue lesion in the posterior left orbital apex/optic canal which is adjacent to or involving the inferior rectus and medial rectus extra-ocular muscles with associated enhancement, and could represent metastatic disease.  This abuts and compresses the posterior optic nerve at the posterior left orbital apex. There is minimal thickening and increased T2 signal with probable minimal enhancement of the anterior left optic nerve that could represent mild optic neuritis."      He has now completed XRT       He reports the following today:  Weight: 30 lb weight loss since June 2023, reports most occurred recently  Fatigue: Energy level poor - fatigue post XRT  Pain:  Reports a new pain for which pain medication not working  Taking: MS Contin 30 mg q 12 hours, Norco as needed 2-3 x per day  Last Thursday noted new pain at tailbone feels like a "muscle " "brianna"  Any movement leads to pain  + LE weakness  Using disposable diapers now  Denies incontinence-- states can't get to the bathroom in time with pain and weakness  Also limited in balance by vision  Denies numbness- states completed PT last Friday due to numbness in right leg- that area had been getting better until above- states no tingling noted now     Denies fevers or chills but reports 1 week of increased mucous production- he just completed a zpak, notes hard for him to cough due to the back pain  Using Mucinex     Vision:  left eye - upper notes figures, lower all cloudy but can see light  Prior to XRT had no vision other than cloudy   Exophthalmos improved, pain resolved     Most recent imaging:  - 2/27/2024 PET- not visible in records and will obtain     - 3/20/2024 MRI orbits:  FINDINGS:  Orbits/Optic Nerves:  Minimal thickening and increased T2 signal with probable minimal enhancement of the left optic nerve could represent mild optic neuritis.  No significant abnormality on the right.  Globes appear within normal limits.  Remainder of the Intracranial Compartment:  No midline shift or hydrocephalus.  No extra-axial blood or fluid collections.  The brain parenchyma appears normal. No cerebral mass lesion, acute hemorrhage, edema, or acute infarct.  There is abnormal soft tissue lesion in the posterior left orbital apex and optic canal, which is adjacent to or involving the inferior rectus and medial rectus extra-ocular muscles.  There is mild associated enhancement.  This could represent metastatic disease.  Minimal associated restricted diffusion on axial 35 of series 6.  This abuts and may compress the posterior optic nerve at the posterior left orbital apex.  Normal vascular flow voids are preserved.  Mild bilateral ethmoid and right maxillary sinus disease.  Skull/Extracranial Contents (limited evaluation): Bone marrow signal intensity is normal.  Impression:  1. There is abnormal soft " tissue lesion in the posterior left orbital apex/optic canal which is adjacent to or involving the inferior rectus and medial rectus extra-ocular muscles with associated enhancement, and could represent metastatic disease.  This abuts and compresses the posterior optic nerve at the posterior left orbital apex.  2. There is minimal thickening and increased T2 signal with probable minimal enhancement of the anterior left optic nerve that could represent mild optic neuritis.  Follow-up recommended.  3. Mild paranasal sinus disease.  4. This report was flagged in Epic as abnormal.         Oncology History  Malignant neoplasm involving both nipple and areola of right breast in male, estrogen receptor negative- initially Stage IB, now metastatic  Metastatic Triple Negative Breast Cancer: progressed from prior Stage IB   5/2/2019: Right breast retroareolar core biopsy  S/p Right breast mastectomy with axillary lymph node dissection in 2019  S/p neoadjuvant ddAC/carbo-taxol, adjuvant xeloda, XRT   Recurrence with bone metastasis in 8/2021, received abraxane and progressed on it in 3/2023  Started on Trodelvy in 5/2023 - ongoing  - His schedule of trodelvy is [id number] with growth factor support due to   neutropenia   Liquid biopsy: PIK3CA mutation  PDL 1 <1%  Precerted for Sacituzumab govitecan-hziy-for a transfer of facility  - PET CT 10/10/23:               - reviewed with previous images  - upon our review, exam is stable except subcarinal LN increase in size (max SUV of 10.9 measuring 2.4 x 2.7 cm, previously was 1.5cm with max SUV 10)  - MRI C spine from 12/15/23 reviewed, shows degenerative changes but no concerning metastatic lesions. Reports for lumbar spine and thoracic spine not uploaded, have requested from [facility]. Reviewed images, show known T6 lesion (seen on PET scan Oct 2023) but no other clear lesions.      1.  Metastatic Breast Cancer:              1.  Right Breast Cancer Stage IB (pT1b pN1):  T=5 mm, G=2,  N=1/15, ER=negative, SD=negative. [id number]: IHC 0, FISH=.  Ki67=80%.  MammaPrint=.  Date: 5/2/2019                          A.  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  Biopsy: 5/2/2019: Right breast retroareolar core biopsy                          C.  Staging Studies:                          D.  NeoAdjuvant Chemo:                                       ddAC x 4 / TaxolCarbo wkly x 12.   5/27/2019 - 10/15/2019                          E.  Surgery:                                      11/22/2019: R Mastectomy/SLN Dr Whyte: Right breast mastectomy with SLN biopsy no LVI with 1 LN positive 4 mm, negative margins.                                      12/17/2019:  ALND  0/14 positive for carcinoma.                          F.  XRT: 2/3/2020- 3/23/2020: Total Dose: 50.4 Gy to the chest wall and regional lymphatic                          G.  Adjuvant Chemo:                                        Xeloda 1000 mg/m2 bid days 1-14 every 21 day x 6.   4/15/2020 - 9/28/20                 2.  Recurrence:  7/2/2021.                          A.  Biopsy:  Left iliac bone biopsy: 7/2/2021: Positive for metastatic breast ca                          B.  Staging:                                      MRI T/L spine 6/3/2021:  Abnormal  T12, S1, S2 vertebral bodies, LEFT sacrum and ilium concerning for metastatic disease.                                       PET 6/9/2021: Multiple sclerotic lesions in the T12 vertebral body, sacrum, and left iliac bone with hypermetabolic activity concerning for active metastatic disease.                                      C.  Treatment:                                      Abraxane:  8/13/2021 - 3/25/2023.     2.  Genetic Testing:  Genetic testing: AXIN2, RNF43 (VUS) identified     TREATMENT PLAN:                 SACITUZUMAB q 2wk with Neulasta support:                            5/19/2023 - 7/14/2023.                            Resumed 8/30/2023 - .                  Zometa q 4 wkly a:  8/2022 - .                  Liquid biopsy:                            PIK3CA mutation                          PDL-1 <1%.     Restaging:   PET: 2/15/2022: stable osseous sclerotic lesions and pulmonary micro nodules  PET 6/6/2022: stable   PET 11/4/2022:stable  PET 3/20/2023: New derick metastasis (right hilar node). Otherwise stable  MRI sacrum/coccyx 3/29/2023: Extensive osseous metastatic disease of the pelvis involving much of the sacrum.  No evidence for invasion of the sacral canal or neural foramina.  MRI L-spine 3/29/2023: Dffuse osseous metastatic disease throughout the lumbar spine.  PET 6/22/2023: Findings suggestive of disease progression, noting worsening osseous metastatic disease and subcarinal/right hilar lymphadenopathy.  PET 10/10/2023:  Stable except subcarinal LN increase per PET.  CT images are not convincing.    Review of Systems  See recent  Trouble keeping weight up  Pain in low back     Objective     Physical Exam  See recent     Assessment and Plan     1. Malignant neoplasm involving both nipple and areola of right breast in male, estrogen receptor negative  Overview:  1.  Metastatic Breast Cancer:   1.  Right Breast Cancer Stage IB (pT1b pN1):  T=5 mm, G=2, N=1/15, ER=negative, WV=negative. [id number]: IHC 0, FISH=.  Ki67=80%.  MammaPrint=.  Date: 5/2/2019    A.  Histology: Invasive ductal carcinoma with lobular features    B.  Biopsy: 5/2/2019: Right breast retroareolar core biopsy    C.  Staging Studies:    D.  NeoAdjuvant Chemo:      ddAC x 4 / TaxolCarbo wkly x 12.   5/27/2019 - 10/15/2019    E.  Surgery:     11/22/2019: R Mastectomy/SLN Dr Whyte: Right breast mastectomy with SLN biopsy no LVI with 1 LN positive 4 mm, negative margins.     12/17/2019:  ALND  0/14 positive for carcinoma.    F.  XRT: 2/3/2020- 3/23/2020: Total Dose: 50.4 Gy to the chest wall and regional lymphatic    G.  Adjuvant Chemo:       Xeloda 1000 mg/m2 bid days 1-14 every 21 day x  6.   4/15/2020 - 9/28/20     2.  Recurrence:  7/2/2021.    A.  Biopsy:  Left iliac bone biopsy: 7/2/2021: Positive for metastatic breast ca    B.  Staging:     MRI T/L spine 6/3/2021:  Abnormal  T12, S1, S2 vertebral bodies, LEFT sacrum and ilium concerning for metastatic disease.      PET 6/9/2021: Multiple sclerotic lesions in the T12 vertebral body, sacrum, and left iliac bone with hypermetabolic activity concerning for active metastatic disease.        C.  Treatment:     Abraxane:  8/13/2021 - 3/25/2023.     SACITUZUMAB q 2wk with Neulasta support:        5/19/2023 - 7/14/2023.        Resumed 8/30/2023 - 2/14/2024.  Progression following stable disease.    2.  Genetic Testing:  Genetic testing: AXIN2, RNF43 (VUS) identified.    3.  Vision loss L eye 2/28/2024.    TREATMENT PLAN:     Vision loss OS:  Consult Dr. Colbert 2/28/2024.    MRI Brain and orbits ordered 2/28/2024.     Faslodex pending   Abemaciclib/ribociclib pending    Discontinue trazodone with ribociclib.    Zometa q 4 wk:  8/2022 - .      Liquid biopsy:      PIK3CA mutation.  Pt receive Apelisib in the past.    PDL-1 <1%.    Restaging:   PET: 2/15/2022: stable osseous sclerotic lesions and pulmonary micro nodules  PET 6/6/2022: stable   PET 11/4/2022:stable  PET 3/20/2023: New derick metastasis (right hilar node). Otherwise stable  MRI sacrum/coccyx 3/29/2023: Extensive osseous metastatic disease of the pelvis involving much of the sacrum.  No evidence for invasion of the sacral canal or neural foramina.  MRI L-spine 3/29/2023: Dffuse osseous metastatic disease throughout the lumbar spine.  PET 6/22/2023: Findings suggestive of disease progression, noting worsening osseous metastatic disease and subcarinal/right hilar lymphadenopathy.  PET 10/10/2023:  Stable except subcarinal LN increase per PET.  CT images are not convincing.  PET 2/27/2024:  Progression in intra-thoracic LN.        2. Bone metastases  Overview:  IMO Regualtory Update  4/1/23      3. Secondary malignant neoplasm of brain      Rad onc consult to manage back pain  Weekly Gemcitabine to follow  Zometa    Nutrition consult    PET scan needs to be done here    Route Chart for Scheduling    Med Onc Chart Routing  Urgent    Follow up with physician . PET, Rad Onc and Nutrition consult- appt with JAC in next 1-2 weeks to consent for chemo and Gemcitabine chemo appt   Follow up with JAC    Infusion scheduling note    Injection scheduling note    Labs    Imaging    Pharmacy appointment    Other referrals            Treatment Plan Information   OP BREAST GEMCITABINE QW   Rosa Linn MD   Upcoming Treatment Dates - OP BREAST GEMCITABINE QW    5/9/2024       Pre-Medications       dexAMETHasone (DECADRON) 10 mg in sodium chloride 0.9% 50 mL IVPB       Chemotherapy       gemcitabine (GEMZAR) 2,630 mg in sodium chloride 0.9% SolP 250 mL chemo infusion  5/16/2024       Pre-Medications       dexAMETHasone (DECADRON) 10 mg in sodium chloride 0.9% 50 mL IVPB       Chemotherapy       gemcitabine (GEMZAR) 2,630 mg in sodium chloride 0.9% SolP 250 mL chemo infusion  5/23/2024       Pre-Medications       dexAMETHasone (DECADRON) 10 mg in sodium chloride 0.9% 50 mL IVPB       Chemotherapy       gemcitabine (GEMZAR) 2,630 mg in sodium chloride 0.9% SolP 250 mL chemo infusion  6/6/2024       Pre-Medications       dexAMETHasone (DECADRON) 10 mg in sodium chloride 0.9% 50 mL IVPB       Chemotherapy       gemcitabine (GEMZAR) 2,630 mg in sodium chloride 0.9% SolP 250 mL chemo infusion    Supportive Plan Information  OP FILGRASTIM 480 MCG   Michelle Grayson, LAUREEN   Upcoming Treatment Dates - OP FILGRASTIM 480 MCG    No upcoming days in selected categories.    Therapy Plan Information  PORT FLUSH  Flushes  heparin, porcine (PF) 100 unit/mL injection flush 500 Units  500 Units, Intravenous, Every visit  sodium chloride 0.9% flush 10 mL  10 mL, Intravenous, Every visit    ZOLEDRONIC ACID (ZOMETA)  IV  Medications  zoledronic acid (ZOMETA) 4 mg in sodium chloride 0.9% 100 mL IVPB  4 mg, Intravenous, Every 4 weeks  Flushes  sodium chloride 0.9% 250 mL flush bag  Intravenous, Every 4 weeks  sodium chloride 0.9% flush 10 mL  10 mL, Intravenous, Every 4 weeks  heparin, porcine (PF) 100 unit/mL injection flush 500 Units  500 Units, Intravenous, Every 4 weeks  alteplase injection 2 mg  2 mg, Intra-Catheter, Every 4 weeks

## 2024-04-29 ENCOUNTER — TELEPHONE (OUTPATIENT)
Dept: HEMATOLOGY/ONCOLOGY | Facility: CLINIC | Age: 49
End: 2024-04-29
Payer: MEDICARE

## 2024-04-29 NOTE — TELEPHONE ENCOUNTER
Spoke w/ pt in regards to scheduling nutrition referral. Pt approved to schedule virtually w/ Nelda Kearney RD for Tuesday 5/14 @ 10AM. Pt stated pt is familiar with utilizing portal for virtual [place]t.       [place] [id number]  
no

## 2024-04-30 ENCOUNTER — TELEPHONE (OUTPATIENT)
Dept: SURGERY | Facility: CLINIC | Age: 49
End: 2024-04-30
Payer: MEDICARE

## 2024-04-30 ENCOUNTER — TUMOR BOARD CONFERENCE (OUTPATIENT)
Dept: SURGERY | Facility: CLINIC | Age: 49
End: 2024-04-30
Payer: MEDICARE

## 2024-04-30 NOTE — PROGRESS NOTES
Oncology History   Malignant neoplasm involving both nipple and areola of right breast in male, estrogen receptor negative   5/1/2019 Imaging Significant Findings    Mammogram and US  Impression:  Right  Mass: Right breast 14 mm mass at the retroareolar anterior position. Assessment: 4 - Suspicious finding. Biopsy is recommended.      Left  There is no mammographic or sonographic evidence of malignancy.          Recommendation:  Biopsy is recommended.     5/2/2019 Biopsy    BREAST, RIGHT, RETROAREOLAR MASS, ULTRASOUND-GUIDED BIOPSY:  Invasive ductal carcinoma with lobular features.  Size of invasive carcinoma: 5 MM in greatest linear dimension within a single     5/2/2019 Breast Tumor Markers    Estrogen: Negative  Progesterone: Negative  HER2: Negative  Ki67: 80     5/17/2019 Cancer Staged    Staging form: Breast, AJCC 8th Edition  - Clinical stage from 5/17/2019: Stage IB (cT1c, cN0, cM0, G2, ER-, NC-, HER2-) - Signed by Richa Bahena MD on 5/17/2019 5/27/2019 - 7/21/2019 Chemotherapy    Treatment Summary   Plan Name: OP BREAST DOSE-DENSE AC - DOXORUBICIN CYCLOPHOSPHAMIDE Q2W  Treatment Goal: Curative  Status: Inactive  Start Date: 5/27/2019  End Date: 7/9/2019  Provider: Richa Bahena MD  Chemotherapy: DOXOrubicin chemo injection 186 mg, 60 mg/m2 = 186 mg, Intravenous, Clinic/HOD 1 time, 4 of 4 cycles  Administration: 186 mg (5/27/2019), 186 mg (6/10/2019), 186 mg (6/24/2019), 186 mg (7/8/2019)  cyclophosphamide (CYTOXAN) 600 mg/m2 = 1,865 mg in sodium chloride 0.9% 250 mL chemo infusion, 600 mg/m2 = 1,865 mg, Intravenous, Clinic/HOD 1 time, 4 of 4 cycles  Administration: 1,865 mg (5/27/2019), 1,865 mg (6/10/2019), 1,865 mg (6/24/2019), 1,865 mg (7/8/2019)     7/22/2019 - 10/15/2019 Chemotherapy    Treatment Summary   Plan Name: OP PACLITAXEL QW  Treatment Goal: Curative  Status: Inactive  Start Date: 7/24/2019  End Date: 10/8/2019  Provider: Richa Bahena MD  Chemotherapy: PACLitaxel (TAXOL) 80  mg/m2 = 246 mg in sodium chloride 0.9% 250 mL chemo infusion, 80 mg/m2 = 246 mg, Intravenous, Clinic/\[facility] [place] [facility]\"" 1 time, 12 of 12 cycles  Dose modification: 60 mg/m2 (original dose 80 mg/m2, Cycle 3), 55 mg/m2 (original dose 80 mg/m2, Cycle 7), 60 mg/m2 (original dose 80 mg/m2, Cycle 7), 50 mg/m2 (original dose 80 mg/m2, Cycle 9), 50 mg/m2 (original dose 80 mg/m2, Cycle 10)  Administration: 246 mg (7/24/2019), 246 mg (7/31/2019), 186 mg (8/7/2019), 186 mg (8/14/2019), 186 mg (8/21/2019), 186 mg (8/28/2019), 186 mg (9/4/2019), 174 mg (9/11/2019), 156 mg (9/18/2019), 156 mg (9/25/2019), 156 mg (10/1/2019), 156 mg (10/8/2019)     11/22/2019 Breast Surgery    On 11/22/19 Dr whyte performed a right breast mastectomy with SLN biopsy with pathology showing grade 2, 6 mm IDC with extensive treatment effect, no LVI with 1 LN positive 4 mm, negative margins. The on 12/17/19, Dr Whyte performed right axillary dissection with pathology still pending.     11/22/2019 Cancer Staged    ypT1b N1     12/17/2019 Breast Surgery    Surgery: Dr Shima Whyte, [phone] performed right ALND with pathology showing 14 negative lymph nodes.          1/14/2020 Tumor Conference       Post mastectomy radiation therapy: Xeloda, then possible Keytruda trial .     2/3/2020 - 3/23/2020 Radiation Therapy    Treating physician: Speedy Nair MD   Total Dose: 50.4 Gy to the chest wall and regional lymphatics  10 Gy boost to the prostate.   Fractions: 28 fractions at 1.8 Gy per fraction  5 fractions at 2 Gy per fraction      3/20/2020 - 3/20/2020 Chemotherapy     * 4/15/2020 - 9/28/20 completed 6 cycles adjuvant Xeloda 1000 mg/m2 bid days 1-14 every 21 days  Treatment Summary   Plan Name: OP CAPECITABINE 1250MG/M2 BID Q3W  Treatment Goal: Curative  Status: Inactive  Start Date: 3/20/2020  End Date: 3/20/2020  Provider: Richa Bahena MD  Chemotherapy: [No matching medication found in this treatment plan]     6/14/2021 - 6/14/2021 Chemotherapy     Treatment Summary   Plan Name: OP CAPECITABINE 1000MG/M2 Q3W  Treatment Goal: Palliative  Status: Inactive  Start Date:   End Date:   Provider: Rosa Linn MD  Chemotherapy: capecitabine (XELODA) 500 MG Tab, 1,000 mg/m2, Oral, 2 times daily, 0 of 1 cycle, Start date: --, End date: --     8/13/2021 - 3/23/2023 Chemotherapy    Treatment Summary   Plan Name: OP BREAST NAB-PACLITAXEL D1, D8, D15 + ALPELISIB   Treatment Goal: Palliative  Status: Inactive  Start Date: 8/13/2021  End Date: 3/23/2023  Provider: Rosa Linn MD  Chemotherapy: alpelisib 300 mg/day (150 mg x 2) Tab, 300 mg (100 % of original dose 300 mg), Oral, Daily, 1 of 1 cycle, Start date: --, End date: --  Dose modification: 300 mg (original dose 300 mg, Cycle 0), 150 mg (original dose 300 mg, Cycle 0), 300 mg (original dose 300 mg, Cycle 0)     4/11/2023 Tumor Conference    Patient with progression of metastatic disease on current treatment on recent PET. Medical Oncology discussed treatment options and recommend trastuzumab.       5/19/2023 - 7/15/2023 Chemotherapy    Treatment Summary   Plan Name: OP SACITUZUMAB GOVITECAN-HZIY D1,15 Q4W (CHANGED IN C2)  Treatment Goal: Palliative  Status: Inactive  Start Date: 5/19/2023  End Date: 7/15/2023  Provider: Rosa Linn MD  Chemotherapy: sacituzumab govitecan-hziy (TRODELVY) 1,440 mg in sodium chloride 0.9% 500 mL chemo infusion, 1,524 mg, Intravenous, Clinic/HOD 1 time, 3 of 17 cycles  Administration: 1,440 mg (5/19/2023), 1,440 mg (6/9/2023), 1,440 mg (6/30/2023), 1,440 mg (7/14/2023)     8/30/2023 - 2/15/2024 Chemotherapy    Treatment Summary   Plan Name: OP SACITUZUMAB GOVITECAN-HZIY Q3W  Treatment Goal: Palliative  Status: Inactive  Start Date: 8/30/2023  End Date: 2/15/2024  Provider: Paul Durbin MD  Chemotherapy: sacituzumab govitecan-hziy (TRODELVY) 1,440 mg in sodium chloride 0.9% 500 mL chemo infusion, 1,493 mg, Intravenous, Clinic/[facility] 1 time, 7 of 17 cycles  Administration: 1,440 mg  (8/30/2023), 1,440 mg (9/13/2023), 1,400 mg (10/18/2023), 1,440 mg (9/29/2023), 1,440 mg (11/1/2023), 1,440 mg (12/13/2023), 1,440 mg (11/29/2023), 1,440 mg (1/3/2024), 1,440 mg (1/24/2024), 1,440 mg (2/14/2024)     3/6/2024 - 3/6/2024 Chemotherapy    Treatment Summary   Plan Name: ALPELISIB FULVESTRANT Q4W  Treatment Goal: Palliative  Status: Inactive  Start Date:   End Date:   Provider: Paul Durbin MD  Chemotherapy: fulvestrant (FASLODEX) injection 500 mg, 500 mg, Intramuscular, Clinic/HOD 1 time, 0 of 12 cycles  alpelisib (PIQRAY) 300 mg/day (150 mg x 2) Tab, 300 mg, Oral, Daily, 0 of 1 cycle, Start date: --, End date: --     3/6/2024 - 3/6/2024 Chemotherapy    Treatment Summary   Plan Name: OP BREAST FULVESTRANT  Treatment Goal: Palliative  Status: Inactive  Start Date:   End Date:   Provider: Paul Durbin MD  Chemotherapy: fulvestrant (FASLODEX) injection 500 mg, 500 mg, Intramuscular, Clinic/HOD 1 time, 0 of 12 cycles     5/9/2024 -  Chemotherapy    Treatment Summary   Plan Name: OP BREAST GEMCITABINE QW  Treatment Goal: Palliative  Status: Active  Start Date: 5/9/2024 (Planned)  End Date: 3/27/2025 (Planned)  Provider: Rosa Linn MD  Chemotherapy: gemcitabine (GEMZAR) 2,630 mg in sodium chloride 0.9% SolP 250 mL chemo infusion, 1,000 mg/m2 = 2,630 mg, Intravenous, Clinic/HOD 1 time, 0 of 12 cycles      Tumor Conference    No [facility] clinical trials at this time. Need orbital mets stability for minimal of 6 months to consider.      The team discussed weekly Gemcitabine, as well as Eribulin. Can add Doxil     For brca mutation can add DNA damaging in that setting. There was     Will get updated PET scan this week               Bone metastases   6/10/2021 Initial Diagnosis    Bone metastases     3/6/2024 - 3/6/2024 Chemotherapy    Treatment Summary   Plan Name: ALPELISIB FULVESTRANT Q4W  Treatment Goal: Palliative  Status: Inactive  Start Date:   End Date:   Provider: Paul Durbin MD  Chemotherapy:  fulvestrant (FASLODEX) injection 500 mg, 500 mg, Intramuscular, Clinic/HOD 1 time, 0 of 12 cycles  alpelisib (PIQRAY) 300 mg/day (150 mg x 2) Tab, 300 mg, Oral, Daily, 0 of 1 cycle, Start date: --, End date: --     3/6/2024 - 3/6/2024 Chemotherapy    Treatment Summary   Plan Name: OP BREAST FULVESTRANT  Treatment Goal: Palliative  Status: Inactive  Start Date:   End Date:   Provider: Paul Durbin MD  Chemotherapy: fulvestrant (FASLODEX) injection 500 mg, 500 mg, Intramuscular, Clinic/HOD 1 time, 0 of 12 cycles     5/9/2024 -  Chemotherapy    Treatment Summary   Plan Name: OP BREAST GEMCITABINE QW  Treatment Goal: Palliative  Status: Active  Start Date: 5/9/2024 (Planned)  End Date: 3/27/2025 (Planned)  Provider: Rosa Linn MD  Chemotherapy: gemcitabine (GEMZAR) 2,630 mg in sodium chloride 0.9% SolP 250 mL chemo infusion, 1,000 mg/m2 = 2,630 mg, Intravenous, Clinic/HOD 1 time, 0 of 12 cycles

## 2024-04-30 NOTE — TELEPHONE ENCOUNTER
Called patient and scheduled PET scan for 5/3/2024, reviewed instructions with patient. Reviewed location of appt. Patient verbalized understanding.

## 2024-05-01 ENCOUNTER — APPOINTMENT (OUTPATIENT)
Dept: RADIATION THERAPY | Facility: OTHER | Age: 49
End: 2024-05-01
Attending: RADIOLOGY
Payer: MEDICARE

## 2024-05-01 ENCOUNTER — OFFICE VISIT (OUTPATIENT)
Dept: PALLIATIVE MEDICINE | Facility: CLINIC | Age: 49
End: 2024-05-01
Payer: MEDICARE

## 2024-05-01 VITALS
SYSTOLIC BLOOD PRESSURE: 110 MMHG | BODY MASS INDEX: 38.66 KG/M2 | HEART RATE: 71 BPM | OXYGEN SATURATION: 99 % | WEIGHT: 293 LBS | DIASTOLIC BLOOD PRESSURE: 53 MMHG

## 2024-05-01 DIAGNOSIS — Z51.5 ENCOUNTER FOR PALLIATIVE CARE: ICD-10-CM

## 2024-05-01 DIAGNOSIS — F43.23 ADJUSTMENT DISORDER WITH MIXED ANXIETY AND DEPRESSED MOOD: ICD-10-CM

## 2024-05-01 DIAGNOSIS — R11.0 NAUSEA: ICD-10-CM

## 2024-05-01 DIAGNOSIS — R53.0 NEOPLASTIC (MALIGNANT) RELATED FATIGUE: ICD-10-CM

## 2024-05-01 DIAGNOSIS — G62.0 CHEMOTHERAPY-INDUCED NEUROPATHY: ICD-10-CM

## 2024-05-01 DIAGNOSIS — R19.7 DIARRHEA, UNSPECIFIED TYPE: ICD-10-CM

## 2024-05-01 DIAGNOSIS — G89.3 NEOPLASM RELATED PAIN: ICD-10-CM

## 2024-05-01 DIAGNOSIS — T45.1X5A CHEMOTHERAPY-INDUCED NEUROPATHY: ICD-10-CM

## 2024-05-01 DIAGNOSIS — C50.021 MALIGNANT NEOPLASM INVOLVING BOTH NIPPLE AND AREOLA OF RIGHT BREAST IN MALE, ESTROGEN RECEPTOR NEGATIVE: Primary | ICD-10-CM

## 2024-05-01 DIAGNOSIS — K59.00 CONSTIPATION, UNSPECIFIED CONSTIPATION TYPE: ICD-10-CM

## 2024-05-01 DIAGNOSIS — R63.0 ANOREXIA: ICD-10-CM

## 2024-05-01 DIAGNOSIS — Z17.1 MALIGNANT NEOPLASM INVOLVING BOTH NIPPLE AND AREOLA OF RIGHT BREAST IN MALE, ESTROGEN RECEPTOR NEGATIVE: Primary | ICD-10-CM

## 2024-05-01 DIAGNOSIS — G47.00 INSOMNIA, UNSPECIFIED TYPE: ICD-10-CM

## 2024-05-01 DIAGNOSIS — Z71.89 ADVANCED CARE PLANNING/COUNSELING DISCUSSION: ICD-10-CM

## 2024-05-01 PROCEDURE — 3008F BODY MASS INDEX DOCD: CPT | Mod: CPTII,S$GLB,, | Performed by: STUDENT IN AN ORGANIZED HEALTH CARE EDUCATION/TRAINING PROGRAM

## 2024-05-01 PROCEDURE — 3078F DIAST BP <80 MM HG: CPT | Mod: CPTII,S$GLB,, | Performed by: STUDENT IN AN ORGANIZED HEALTH CARE EDUCATION/TRAINING PROGRAM

## 2024-05-01 PROCEDURE — 1160F RVW MEDS BY RX/DR IN RCRD: CPT | Mod: CPTII,S$GLB,, | Performed by: STUDENT IN AN ORGANIZED HEALTH CARE EDUCATION/TRAINING PROGRAM

## 2024-05-01 PROCEDURE — 99215 OFFICE O/P EST HI 40 MIN: CPT | Mod: S$GLB,,, | Performed by: STUDENT IN AN ORGANIZED HEALTH CARE EDUCATION/TRAINING PROGRAM

## 2024-05-01 PROCEDURE — 1159F MED LIST DOCD IN RCRD: CPT | Mod: CPTII,S$GLB,, | Performed by: STUDENT IN AN ORGANIZED HEALTH CARE EDUCATION/TRAINING PROGRAM

## 2024-05-01 PROCEDURE — 4010F ACE/ARB THERAPY RXD/TAKEN: CPT | Mod: CPTII,S$GLB,, | Performed by: STUDENT IN AN ORGANIZED HEALTH CARE EDUCATION/TRAINING PROGRAM

## 2024-05-01 PROCEDURE — 3074F SYST BP LT 130 MM HG: CPT | Mod: CPTII,S$GLB,, | Performed by: STUDENT IN AN ORGANIZED HEALTH CARE EDUCATION/TRAINING PROGRAM

## 2024-05-01 PROCEDURE — 99999 PR PBB SHADOW E&M-EST. PATIENT-LVL II: CPT | Mod: PBBFAC,,, | Performed by: STUDENT IN AN ORGANIZED HEALTH CARE EDUCATION/TRAINING PROGRAM

## 2024-05-01 RX ORDER — MORPHINE SULFATE 15 MG/1
15 TABLET, FILM COATED, EXTENDED RELEASE ORAL 2 TIMES DAILY
Qty: 30 TABLET | Refills: 0 | Status: SHIPPED | OUTPATIENT
Start: 2024-05-01 | End: 2024-05-21 | Stop reason: SDUPTHER

## 2024-05-01 RX ORDER — NORTRIPTYLINE HYDROCHLORIDE 25 MG/1
25 CAPSULE ORAL NIGHTLY
Qty: 30 CAPSULE | Refills: 2 | Status: SHIPPED | OUTPATIENT
Start: 2024-05-01 | End: 2024-06-04

## 2024-05-01 NOTE — PROGRESS NOTES
[facility]  [facility]  Progress Note    Reason for Consult: symptom management and ACP   Referring MD: Dr. 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 Linn  - most recent imaging showed progression of disease  - patient moved to [place] and received treatment in [facility] during that time. He is now back in town and re-established care with Dr. Linn (med onc), Dr. Sandoval (onc-psych), and this clinic  - soft tissue lesion noted on left orbital apex/optic canal s/p radiation therapy  - patient seen in ED on 04/15/2024 to rule out cord compression then elected to go home from ED once cord compression ruled out  - plan to start disease directed therapy with weekly Gemcitabine and Zometa  - PET CT planned for 05/03/2024    Encounter for palliative care/Advanced care planning  Advance Care Planning   - patient decisional and by himself in clinic today  - no ACP documents uploaded into EMR  - goals: life prolonging  - philosophy of Palliative Medicine and new patient folder given to and briefly reviewed with patient at first visit  - ACP booklet given to and reviewed with patient at first visit by Sarabjit Casas RN  - code status not specifically discussed at this visit  - will follow up at future appointments for any questions/concerns that arise after patient reviews new patient information   - per chart review, patient's oncology team did start talk about overall poor prognosis of his cancer prior to his move to [place]  - patient initially told that there were no further treatment options but then he was relieved to hear that there is an option available to him.  - patient states he wants to continue all disease directed therapy options available to him  - spoke about the  difficulty of hearing no further treatment. Started discussion today about if this treatment regimen does not work or if there are no treatment options after this regimen, then patient can continue to follow with this clinic while able or patient can enroll in hospice care. Did not go into detail about hospice care today.      Adjustment disorder with mixed anxiety and depressed mood  - patient doing well at this time. He described the last month or so as a roller coaster with loss of vision secondary to tumor needing radiation, concern for cord compression, being told no further treatment, then being told there is a treatment option. He states that he has been relying on his guadalupe, which has been helpful.   - he notes how challenging this has been for his sons and wife. He states he and his wife are in a better place overall.   - he reports living with his parents at this time; he is requesting assistance to see if there are resources available for housing.   - he notes that he feels anxious at times and has to do things in certain order as well as quick to anger over things that never previously bothered him.   - currently on fluoxitine 80 mg daily and xanax 0.5 mg PRN TID  - patient re-established care with oncology psychology, Dr. Sandoval. Patient's spouse now also with therapist  - START Wellbutrin 150 mg daily in addition to fluoxetine today  - will continue to monitor closely     Insomnia  - worsening  - he notes that one to two times a week, he will just be up all night  - usually gets ~ 4 hours of sleep  - stop trazodone today  - start nortriptyline 25 mg qhs for neuropathy and sleep  - tip sheet in new patient folder for patient to review  - will continue close monitoring    Cancer related pain/Neuropathic pain  - patient with moderate to severe pain in his lower back that he describes as nerve pain  - he notes that if he does certain maneuvers (eg. Bending a certain way) the pain occurs and takes a  while to resolve  - he is currently using ibuprofen BID for pain relief  - he continue to experience neuropathy in his bilateral lower extremities; compliant with gabapentin 900 mg TID  - patient reports that he uses his Norco as needed  - no need for refill of norco at this time  - patient was started on MS Contin while receiving treatment in [place]; patient notes that he does not find it helpful for the pain he has. He states that he does not have any other pain at this time except the back/nerve pain  - DECREASE MS Contin to 15 mg BID for two weeks then stop  - opioid safety sheet in new patient folder for patient to review  - will continue to monitor    Nausea/Anorexia  - no issues with nausea at this time  - patient reports appetite fluctuates; there are days that he does not want to eat anything and other days where he will want to eat everything  - discussed using smaller portions more frequently and protein shakes/smoothies as needed  - discussed staying hydrated as well  - patient uses zofran prn to help with nausea    Constipation/Diarrhea  - no issues at this time; previously would fluctuate between constipation and diarrhea.   - previously discussed using bowel regimen to prevent build up of stool  - discussed need for adequate hydration  - constipation tip sheet in new patient folder for patient to review    Understanding of illness/Prognosis: patient has good understanding of his illness; prognosis is poor    Goals of care: life prolonging    Follow up: ~ 6 weeks    Patient's encounter and above plan of care discussed with patient's oncology-psychologist    SUBJECTIVE:     History of Present Illness:  Patient is a 48 y.o. year old male with arthritis, DM, HTN, and right sided breast cancer presents to Palliative Medicine for symptom management and ACP. Please see oncology notes for full details of oncologic history and treatment course.     05/01/2024:  LA  reviewed and summarized:  04/09/2024  Gabapentin 300 mg Disp: 252 for 28 days  04/03/2024 MS contin 30 mg Disp: 60 for 30 days  03/27/2024 Alprazolam 0.5 mg Disp: 60 for 20 days  03/27/2024 Hydrocodone-apap  mg Disp: 90 for 30 days    History of Present Illness    CHIEF COMPLAINT:  - Follow-up for ongoing cancer treatment  - Management of nerve pain    HISTORY OBTAINED FROM:  - Patient    HPI:  The patient presented for a follow-up visit. He described a recent life-changing event where he was informed that there were no further medical treatments available for his condition. However, 1.5 weeks later, he was informed that a new treatment had been found. This emotional rollercoaster had a significant impact on him and his family, particularly his sons. . 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 The eye was bulging out significantly, but radiation treatment helped to shrink the tumor and restore some of his vision. He described his current vision as being like looking through dirty glasses, with some areas clearer than others. . He also reported experiencing persistent lower back pain, which he described as a constant, dull ache that has intensified over the past two weeks. The pain is particularly severe when he bends over or stretches, causing him to grit his teeth. He has been managing the pain with ibuprofen and a 12-hour release morphine pill, although he expressed uncertainty about the effectiveness of the morphine. . The patient also reported experiencing urinary incontinence, which he manages with Depend products. He has to go to the bathroom urgently and frequently, and sometimes does not make it in time. . He also expressed concerns about his mental health, noting that his mind often races and he has been experiencing anger and frustration more easily. He also reported having difficulty sleeping, often only getting two hours of sleep a night.      GOALS OF CARE/ADVANCED DIRECTIVES:  - The patient expressed that the hardest thing he had to do was to inform his sons about his terminal illness. His family, especially his sons, play a significant role in his care. His second son was particularly affected by the news of his illness, which shook his guadalupe. The patient's wife also experiences anxiety whenever he leaves the house without her. The patient's overall goal is to continue with the new treatment plan that has been found for him. He expressed relief and gratitude when he was informed about the new treatment. The patient's living situation is currently with his parents, but he is seeking assistance for housing.    ACTIVITIES OF DAILY LIVING:  - Patient has been experiencing nerve pain in his back, which has affected his mobility. He now uses a cane for support.  - Patient has difficulty maintaining balance due to nerve pain and has to be cautious when getting up.  - Patient has been experiencing urinary incontinence, which he attributes to nerve pain. He has started using adult diapers as a precautionary measure.  - Patient has had recent changes in his vision due to a tumor behind his eye. His vision has been improving after radiation therapy.  - Patient has had recent changes in his memory and thinking abilities, which he attributes to the effects of radiation therapy.  - Patient has made adjustments to his lifestyle due to physical impairments. He has started using a cane for mobility and adult diapers due to urinary incontinence.  - Patient has regular social routines with his family and has been receiving emotional support from them.  - Patient has been experiencing sleeping difficulties. He often gets only 4-5 hours of sleep per night.  - Patient has had recent changes in his appetite due to medication side effects. He has been managing his diet to maintain his glucose levels.    SOCIAL HISTORY:  - Patient is          Please see previous  notes for full details of encounters on 10/11/2021; 12/13/2021; 02/11/2022; 09/02/2022; 09/30/2022; 10/11/2022; 12/15/2022; 02/09/2023; 02/27/2023; 05/11/2023; 06/14/2023;     Past Medical History:   Diagnosis Date    Breast cancer     Cancer     R breast    Diabetes mellitus     HTN (hypertension)     Leg edema, right     Prediabetes     Seizures     at age 18 - stress related    Therapy     marital counseling     Past Surgical History:   Procedure Laterality Date    AXILLARY NODE DISSECTION Right 11/22/2019    Procedure: LYMPHADENECTOMY, AXILLARY RIGHT;  Surgeon: Shima Whyte MD;  Location: [facility] OR;  Service: General;  Laterality: Right;    AXILLARY NODE DISSECTION Right 12/17/2019    Procedure: LYMPHADENECTOMY, AXILLARY;  Surgeon: Shima Whyte MD;  Location: [facility] OR 2ND FLR;  Service: General;  Laterality: Right;    BREAST BIOPSY      EXCISION OF HYDROCELE Right 10/28/2022    Procedure: HYDROCELECTOMY;  Surgeon: Anthony Cordero Jr., MD;  [facility];  Service: Urology;  Laterality: Right;  1 hour    INSERTION OF TUNNELED CENTRAL VENOUS CATHETER (CVC) WITH SUBCUTANEOUS PORT Left 5/24/2019    Procedure: [id number];  Surgeon: Shima Whyte MD;  Location: [facility] OR 2ND FLR;  Service: General;  Laterality: Left;    INSERTION OF TUNNELED CENTRAL VENOUS CATHETER (CVC) WITH SUBCUTANEOUS PORT Left 9/17/2021    Procedure: INSERTION, SINGLE LUMEN CATHETER WITH PORT, WITH FLUOROSCOPIC GUIDANCE, right;  Surgeon: Gloria Holliday MD;  Location: [facility] OR 2ND FLR;  Service: General;  Laterality: Left;  rapid covid test    SENTINEL LYMPH NODE BIOPSY Right 11/22/2019    Procedure: BIOPSY, LYMPH NODE, SENTINEL RIGHT;  Surgeon: Shima Whyte MD;  Location: [facility] OR;  Service: General;  Laterality: Right;    SIMPLE MASTECTOMY Right 11/22/2019    Procedure: MASTECTOMY, SIMPLE RIGHT (CONSENT AM OF) 2.5 hr case;  Surgeon: Shima Whyte MD;  [facility];  Service: General;  Laterality: Right;     Family  History   Problem Relation Name Age of Onset    Hypertension Mother      Diabetes Mother      Hypertension Father      Lupus Father      Post-traumatic stress disorder Brother      No Known Problems Maternal Grandmother      Colon cancer Maternal Grandfather      Breast cancer Paternal Grandmother      No Known Problems Paternal Grandfather      No Known Problems Sister      No Known Problems Maternal Aunt      No Known Problems Maternal Uncle      Fibromyalgia Paternal Aunt      Lupus Paternal Aunt      No Known Problems Paternal Uncle      No Known Problems Brother      No Known Problems Sister      No Known Problems Son      No Known Problems Son      No Known Problems Son      No Known Problems Son       Review of patient's allergies indicates:  No Known Allergies    Medications:    Current Outpatient Medications:     ALPRAZolam (XANAX) 0.5 MG tablet, Take 1 tablet (0.5 mg total) by mouth 3 (three) times daily as needed for Insomnia or Anxiety., Disp: 60 tablet, Rfl: 0    amLODIPine (NORVASC) 10 MG tablet, TAKE 1 TABLET (10 MG) BY MOUTH EVERY DAY, Disp: 90 tablet, Rfl: 3    bisacodyL (DULCOLAX) 5 mg EC tablet, Take 1 tablet (5 mg total) by mouth daily as needed for Constipation., Disp: 30 tablet, Rfl: 1    calcium-vitamin D3 (OYSTER SHELL CALCIUM-VIT D3) 500 mg-5 mcg (200 unit) per tablet, Take 1 tablet by mouth 2 (two) times daily., Disp: 180 tablet, Rfl: 3    diclofenac sodium (VOLTAREN) 1 % Gel, Apply 2 g topically 4 (four) times daily. Apply to the low back for inflammation control., Disp: 100 g, Rfl: 0    diphenoxylate-atropine 2.5-0.025 mg (LOMOTIL) 2.5-0.025 mg per tablet, Take 1 tablet by mouth 4 (four) times daily as needed for Diarrhea., Disp: 60 tablet, Rfl: 2    dulaglutide (TRULICITY) 1.5 mg/0.5 mL pen injector, Inject 1.5 mg into the skin once a week., Disp: 4 pen , Rfl: 5    ferrous sulfate (IRON) 325 mg (65 mg iron) Tab tablet, Take 1 tablet (325 mg total) by mouth once daily., Disp: 90 tablet,  Rfl: 1    FLUoxetine 40 MG capsule, Take 2 capsules (80 mg total) by mouth once daily., Disp: 60 capsule, Rfl: 0    gabapentin (NEURONTIN) 300 MG capsule, Take 3 capsules (900 mg total) by mouth 3 (three) times daily., Disp: 270 capsule, Rfl: 2    hydroCHLOROthiazide (HYDRODIURIL) 25 MG tablet, TAKE 1 TABLET BY MOUTH ONCE DAILY, Disp: 90 tablet, Rfl: 3    HYDROcodone-acetaminophen (NORCO)  mg per tablet, Take 1 tablet by mouth every 8 (eight) hours as needed for Pain., Disp: 90 tablet, Rfl: 0    ibuprofen (ADVIL,MOTRIN) 600 MG tablet, Take 1 tablet (600 mg total) by mouth every 8 (eight) hours as needed for Pain., Disp: 20 tablet, Rfl: 0    insulin detemir U-100, Levemir, (LEVEMIR FLEXPEN) 100 unit/mL (3 mL) InPn pen, Inject 11 Units into the skin every evening. Patient had low blood sugar while in-patient, decreasing dose, Disp: 6 mL, Rfl: 1    insulin lispro (HUMALOG KWIKPEN INSULIN) 100 unit/mL pen, Inject 5 Units into the skin 3 (three) times daily., Disp: 6 mL, Rfl: 2    lancets 33 gauge Misc, 1 lancet by Misc.(Non-Drug; Combo Route) route once daily., Disp: 100 each, Rfl: 3    lancing device Misc, 1 Device by Misc.(Non-Drug; Combo Route) route 2 (two) times daily with meals., Disp: 1 each, Rfl: 0    LIDOcaine (LIDODERM) 5 %, Place 1 patch onto the skin once daily. Remove & Discard patch within 12 hours or as directed by MD, Disp: 7 patch, Rfl: 0    LIDOcaine (LIDODERM) 5 %, Place 1 patch onto the skin once daily. Remove & Discard patch within 12 hours or as directed by MD, Disp: 7 patch, Rfl: 0    loratadine (CLARITIN) 10 mg tablet, Take 1 tablet (10 mg total) by mouth once daily., Disp: 30 tablet, Rfl: 3    losartan (COZAAR) 50 MG tablet, Take 2 tablets by mouth once daily, Disp: 180 tablet, Rfl: 3    metFORMIN (GLUCOPHAGE) 500 MG tablet, Take 2 tablets (1,000 mg total) by mouth 2 (two) times daily with meals. Needs appointment for future refills, Disp: 120 tablet, Rfl: 2    morphine (MS CONTIN) 30 MG  "12 hr tablet, Take 1 tablet (30 mg total) by mouth 2 (two) times daily., Disp: 60 tablet, Rfl: 0    OLANZapine (ZYPREXA) 5 MG tablet, Take 1 tablet (5 mg total) by mouth every evening. days 1-4 of each chemotherapy cycle., Disp: 30 tablet, Rfl: 0    ondansetron (ZOFRAN-ODT) 8 MG TbDL, Dissolve 1 tablet (8 mg total) by mouth or dissolve on tongue every 8 (eight) hours as needed (nausea/vomiting).  Let saliva dissolve tablet., Disp: 60 tablet, Rfl: 5    pantoprazole (PROTONIX) 40 MG tablet, Take 1 tablet (40 mg total) by mouth once daily. While taking dexamethasone, Disp: 30 tablet, Rfl: 1    pen needle, diabetic (BD ULTRA-FINE TANESHA PEN NEEDLE) 32 gauge x 5/32" Ndle, Use as directed with novolog and levemir (4 times daily), Disp: 100 each, Rfl: 5    ribociclib (KISQALI) 600 mg/day (200 mg x 3) Tab, Take 600 mg by mouth once daily. (Patient not taking: Reported on 3/26/2024.), Disp: 63 tablet, Rfl: 11    tadalafiL (CIALIS) 5 MG tablet, Take 2 tablets (10 mg total) by mouth daily as needed for Erectile Dysfunction. (Patient not taking: Reported on 3/26/2024), Disp: 20 tablet, Rfl: 1    traZODone (DESYREL) 100 MG tablet, Take 1 tablet (100 mg total) by mouth every evening., Disp: 30 tablet, Rfl: 2    OBJECTIVE:       ROS:  Review of Systems   Constitutional:  Positive for activity change, appetite change and fatigue (stable).   HENT: Negative.     Eyes: Negative.    Respiratory: Negative.     Cardiovascular: Negative.    Gastrointestinal: Negative.  Negative for abdominal pain, constipation, diarrhea, nausea and vomiting.   Genitourinary: Negative.    Musculoskeletal:  Positive for back pain, gait problem and myalgias.   Skin: Negative.    Neurological:         + neuropathic pain in bilateral lower extremities   Psychiatric/Behavioral:  Positive for dysphoric mood and sleep disturbance. Negative for self-injury and suicidal ideas. The patient is nervous/anxious.    All other systems reviewed and are negative.      Review " of Symptoms      Symptom Assessment (ESAS 0-10 Scale)  Pain:  5  Dyspnea:  0  Anxiety:  0  Nausea:  0  Depression:  2  Anorexia:  5  Fatigue:  0  Insomnia:  6  Restlessness:  0  Agitation:  0     CAM / Delirium:  Negative  Constipation:  Negative  Diarrhea:  Negative    Anxiety:  Is nervous/anxious  Constipation:  No constipation    Bowel Management Plan (BMP):  Yes      Pain Assessment:  OME in 24 hours:  0-10  Location(s): leg and back    Back       Location: lower        Quality: Shooting, stabbing and throbbing        Quantity: 5/10 in intensity        Chronicity: Onset 1 month(s) ago, unchanged        Aggravating Factors: Activity        Alleviating Factors: NSAIDs       Associated Symptoms: None  Leg       Location: bilateral        Quality: Tingling        Quantity: 0/10 in intensity        Chronicity: Onset 6 (greater than) month(s) ago, stable        Aggravating Factors: None        Alleviating Factors: Opiates        Associated Symptoms: None    Modified Mikal Scale:  0    ECOG Performance Status ndGndrndanddndend:nd nd2nd Living Arrangements:  Lives with spouse    Psychosocial/Cultural:   See Palliative Psychosocial Note: No  Patient lives with his wife. He has three sons (one set of twins)    One son lives at home with his 1 yo grandson.  Brings additional iliana to his life  **Primary  to Follow**  Palliative Care  Consult: No    Spiritual:  F - Mariella and Belief:  Yes  I - Importance:  High  A - Address in Care:  Yes      Advance Care Planning   Advance Directives:   Living Will: No    LaPOST: No    Do Not Resuscitate Status: No    Medical Power of : No    Agent's Name:  Efraín Li   Agent's Contact Number:  [phone]    Decision Making:  Patient answered questions  Goals of Care: The patient endorses that what is most important right now is to focus on symptom/pain control and extending life as long as possible.    Accordingly, we have decided that the best plan to meet the  patient's goals includes continuing with treatment            Physical Exam:   Vitals:     Vitals:    05/01/24 1307   BP: (!) 110/53   Pulse: 71     Physical Exam  Vitals reviewed.   Constitutional:       General: He is not in acute distress.     Appearance: He is not ill-appearing.   HENT:      Head: Normocephalic and atraumatic.      Right Ear: External ear normal.      Left Ear: External ear normal.      Nose: Nose normal.      Mouth/Throat:      Mouth: Mucous membranes are moist.   Eyes:      General: No scleral icterus.        Right eye: No discharge.         Left eye: No discharge.   Neck:      Comments: Trachea midline  Pulmonary:      Effort: Pulmonary effort is normal. No respiratory distress.   Abdominal:      General: Abdomen is flat. There is no distension.   Musculoskeletal:         General: Swelling (minimal right arm lymphedema) present. No deformity or signs of injury.      Cervical back: Normal range of motion.      Comments: Sitting up without assistance; able to move upper extremities without limitation   Skin:     Coloration: Skin is not pale.      Findings: No lesion or rash.   Neurological:      General: No focal deficit present.      Mental Status: He is alert and oriented to person, place, and time.      Cranial Nerves: No cranial nerve deficit.      Gait: Gait abnormal.   Psychiatric:         Attention and Perception: Attention normal.         Mood and Affect: Affect normal.         Speech: Speech normal.         Behavior: Behavior normal.         Thought Content: Thought content normal.         Cognition and Memory: Cognition normal.         Judgment: Judgment normal.         Labs:  CBC:   WBC   Date Value Ref Range Status   04/15/2024 4.43 3.90 - 12.70 K/uL Final     Hemoglobin   Date Value Ref Range Status   04/15/2024 8.2 (L) 14.0 - 18.0 g/dL Final     POC Hematocrit   Date Value Ref Range Status   11/07/2022 35 (L) 36 - 54 %PCV Final     Hematocrit   Date Value Ref Range Status  "  04/15/2024 26.5 (L) 40.0 - 54.0 % Final     MCV   Date Value Ref Range Status   04/15/2024 76 (L) 82 - 98 fL Final     Platelets   Date Value Ref Range Status   04/15/2024 154 150 - 450 K/uL Final       LFT:   Lab Results   Component Value Date    AST 27 04/15/2024    ALKPHOS 152 (H) 04/15/2024    BILITOT 0.4 04/15/2024       Albumin:   Albumin   Date Value Ref Range Status   04/15/2024 2.5 (L) 3.5 - 5.2 g/dL Final     Protein:   Total Protein   Date Value Ref Range Status   04/15/2024 7.3 6.0 - 8.4 g/dL Final       Radiology:I have reviewed all pertinent imaging results/findings within the past 24 hours.    04/15/2024 MRI lumbar spine, Sacrum/coccyx: "Persistent marrow replacement suggesting primary/metastatic bone disease No enhancing lesions involving the T11-T12 and L1 vertebral body and posterior lamina and spinous processes.  This region is likely better evaluated on dedicated MRI of the thoracic spine if clinically warranted.  No definite cord compression is imaged. Stable MR of the sacrum and visualized adjacent pelvic bones with no cortical disruption, new enhancement or nerve root compression."    03/20/2024 MRI Head orbits: "1. There is abnormal soft tissue lesion in the posterior left orbital apex/optic canal which is adjacent to or involving the inferior rectus and medial rectus extra-ocular muscles with associated enhancement, and could represent metastatic disease.  This abuts and compresses the posterior optic nerve at the posterior left orbital apex. 2. There is minimal thickening and increased T2 signal with probable minimal enhancement of the anterior left optic nerve that could represent mild optic neuritis.  Follow-up recommended. 3. Mild paranasal sinus disease. 4. This report was flagged in Epic as abnormal."    I spent a total of 59 minutes on the day of the visit.This includes face to face time in discussion of goals of care, symptom assessment, coordination of care and emotional support.  " This also includes non-face to face time preparing to see the patient (eg, review of tests/imaging), obtaining and/or reviewing separately obtained history, documenting clinical information in the electronic or other health record, independently interpreting results and communicating results to the patient/family/caregiver, or care coordinator.     Signature: Angeles Rodriguez MD

## 2024-05-02 RX ORDER — BUPROPION HYDROCHLORIDE 150 MG/1
150 TABLET ORAL DAILY
Qty: 30 TABLET | Refills: 2 | Status: SHIPPED | OUTPATIENT
Start: 2024-05-02 | End: 2024-06-04 | Stop reason: SINTOL

## 2024-05-03 ENCOUNTER — PATIENT MESSAGE (OUTPATIENT)
Dept: HEMATOLOGY/ONCOLOGY | Facility: CLINIC | Age: 49
End: 2024-05-03
Payer: MEDICARE

## 2024-05-07 ENCOUNTER — TELEPHONE (OUTPATIENT)
Dept: HEMATOLOGY/ONCOLOGY | Facility: CLINIC | Age: 49
End: 2024-05-07
Payer: MEDICARE

## 2024-05-07 DIAGNOSIS — T45.1X5A CHEMOTHERAPY-INDUCED NEUROPATHY: ICD-10-CM

## 2024-05-07 DIAGNOSIS — G62.0 CHEMOTHERAPY-INDUCED NEUROPATHY: ICD-10-CM

## 2024-05-07 RX ORDER — GABAPENTIN 300 MG/1
900 CAPSULE ORAL 3 TIMES DAILY
Qty: 270 CAPSULE | Refills: 2 | Status: SHIPPED | OUTPATIENT
Start: 2024-05-07

## 2024-05-08 ENCOUNTER — PATIENT MESSAGE (OUTPATIENT)
Dept: RADIOLOGY | Facility: HOSPITAL | Age: 49
End: 2024-05-08
Payer: MEDICARE

## 2024-05-08 NOTE — PROGRESS NOTES
PATIENT IDENTIFICATION:  Patient Name: Paul Li Jr.  MRN: [id number]  : 1975    DIAGNOSIS:  Cancer Staging   Malignant neoplasm involving both nipple and areola of right breast in male, estrogen receptor negative  Staging form: Breast, AJCC 8th Edition  - Clinical stage from 2019: Stage IV (cT1c, cN0, cM1, G2, ER-, NE-, HER2-) - Signed by Rosa Linn MD on 4/15/2024      HISTORY OF PRESENT ILLNESS:   The patient is a 48-year-old man with metastatic breast cancer.  He has been referred for consideration of palliative radiation to the spine.    The patient is well known to our department as he is previously received radiation to the right breast and left orbit.      FINDINGS:  Vertebrae: The fatty marrow appears replaced on all pulse sequences.  On STIR images there are areas of increased signal intensity in the T11 and T12 vertebral body which extend into the lamina and into the spinous processes especially of T11 incompletely imaged on MR of the lumbar spine.  These regions demonstrate significant enhancement.  Additionally, areas of increased signal and enhancement are noted in the lamina of L1 right more than left extending into the spinous process region.  No fracture.  No epidural extension.     Marrow replacement of the sacrum and iliac bones is noted with heterogeneous enhancement.  Again no cortical disruption or invasion of the central neural canal or neural foramen is evident.  Muscles within the pelvis appear stable and symmetric.     Impression:     Persistent marrow replacement suggesting primary/metastatic bone disease     New enhancing lesions involving the T11-T12 and L1 vertebral body and posterior lamina and spinous processes.  This region is likely better evaluated on dedicated MRI of the thoracic spine if clinically warranted.  No definite cord compression is imaged.     Stable MR of the sacrum and visualized adjacent pelvic bones with no cortical disruption, new enhancement or  nerve root compression.    Patient complains of pain in the coccyx.     Oncology History   Malignant neoplasm involving both nipple and areola of right breast in male, estrogen receptor negative   5/1/2019 Imaging Significant Findings    Mammogram and US  Impression:  Right  Mass: Right breast 14 mm mass at the retroareolar anterior position. Assessment: 4 - Suspicious finding. Biopsy is recommended.      Left  There is no mammographic or sonographic evidence of malignancy.          Recommendation:  Biopsy is recommended.     5/2/2019 Biopsy    BREAST, RIGHT, RETROAREOLAR MASS, ULTRASOUND-GUIDED BIOPSY:  Invasive ductal carcinoma with lobular features.  Size of invasive carcinoma: 5 MM in greatest linear dimension within a single     5/2/2019 Breast Tumor Markers    Estrogen: Negative  Progesterone: Negative  HER2: Negative  Ki67: 80     5/17/2019 Cancer Staged    Staging form: Breast, AJCC 8th Edition  - Clinical stage from 5/17/2019: Stage IB (cT1c, cN0, cM0, G2, ER-, HI-, HER2-) - Signed by Richa Bahena MD on 5/17/2019 5/27/2019 - 7/21/2019 Chemotherapy    Treatment Summary   Plan Name: OP BREAST DOSE-DENSE AC - DOXORUBICIN CYCLOPHOSPHAMIDE Q2W  Treatment Goal: Curative  Status: Inactive  Start Date: 5/27/2019  End Date: 7/9/2019  Provider: Richa Bahena MD  Chemotherapy: DOXOrubicin chemo injection 186 mg, 60 mg/m2 = 186 mg, Intravenous, Clinic/HOD 1 time, 4 of 4 cycles  Administration: 186 mg (5/27/2019), 186 mg (6/10/2019), 186 mg (6/24/2019), 186 mg (7/8/2019)  cyclophosphamide (CYTOXAN) 600 mg/m2 = 1,865 mg in sodium chloride 0.9% 250 mL chemo infusion, 600 mg/m2 = 1,865 mg, Intravenous, Clinic/HOD 1 time, 4 of 4 cycles  Administration: 1,865 mg (5/27/2019), 1,865 mg (6/10/2019), 1,865 mg (6/24/2019), 1,865 mg (7/8/2019)     7/22/2019 - 10/15/2019 Chemotherapy    Treatment Summary   Plan Name: OP PACLITAXEL QW  Treatment Goal: Curative  Status: Inactive  Start Date: 7/24/2019  End Date:  10/8/2019  Provider: Richa Bahena MD  Chemotherapy: PACLitaxel (TAXOL) 80 mg/m2 = 246 mg in sodium chloride 0.9% 250 mL chemo infusion, 80 mg/m2 = 246 mg, Intravenous, [facility] 1 time, 12 of 12 cycles  Dose modification: 60 mg/m2 (original dose 80 mg/m2, Cycle 3), 55 mg/m2 (original dose 80 mg/m2, Cycle 7), 60 mg/m2 (original dose 80 mg/m2, Cycle 7), 50 mg/m2 (original dose 80 mg/m2, Cycle 9), 50 mg/m2 (original dose 80 mg/m2, Cycle 10)  Administration: 246 mg (7/24/2019), 246 mg (7/31/2019), 186 mg (8/7/2019), 186 mg (8/14/2019), 186 mg (8/21/2019), 186 mg (8/28/2019), 186 mg (9/4/2019), 174 mg (9/11/2019), 156 mg (9/18/2019), 156 mg (9/25/2019), 156 mg (10/1/2019), 156 mg (10/8/2019)     11/22/2019 Breast Surgery    On 11/22/19 Dr whyte performed a right breast mastectomy with SLN biopsy with pathology showing grade 2, 6 mm IDC with extensive treatment effect, no LVI with 1 LN positive 4 mm, negative margins. The on 12/17/19, Dr Whyte performed right axillary dissection with pathology still pending.     11/22/2019 Cancer Staged    ypT1b N1     12/17/2019 Breast Surgery    Surgery: Dr Shima Whyte, [phone] performed right ALND with pathology showing 14 negative lymph nodes.          1/14/2020 Tumor Conference       Post mastectomy radiation therapy: Xeloda, then possible Keytruda trial .     2/3/2020 - 3/23/2020 Radiation Therapy    Treating physician: Speedy Nair MD   Total Dose: 50.4 Gy to the chest wall and regional lymphatics  10 Gy boost to the prostate.   Fractions: 28 fractions at 1.8 Gy per fraction  5 fractions at 2 Gy per fraction      3/20/2020 - 3/20/2020 Chemotherapy     * 4/15/2020 - 9/28/20 completed 6 cycles adjuvant Xeloda 1000 mg/m2 bid days 1-14 every 21 days  Treatment Summary   Plan Name: OP CAPECITABINE 1250MG/M2 BID Q3W  Treatment Goal: Curative  Status: Inactive  Start Date: 3/20/2020  End Date: 3/20/2020  Provider: Richa Bahena MD  Chemotherapy: [No matching  medication found in this treatment plan]     6/14/2021 - 6/14/2021 Chemotherapy    Treatment Summary   Plan Name: OP CAPECITABINE 1000MG/M2 Q3W  Treatment Goal: Palliative  Status: Inactive  Start Date:   End Date:   Provider: Rosa Linn MD  Chemotherapy: capecitabine (XELODA) 500 MG Tab, 1,000 mg/m2, Oral, 2 times daily, 0 of 1 cycle, Start date: --, End date: --     8/13/2021 - 3/23/2023 Chemotherapy    Treatment Summary   Plan Name: OP BREAST NAB-PACLITAXEL D1, D8, D15 + ALPELISIB   Treatment Goal: Palliative  Status: Inactive  Start Date: 8/13/2021  End Date: 3/23/2023  Provider: Rosa Linn MD  Chemotherapy: alpelisib 300 mg/day (150 mg x 2) Tab, 300 mg (100 % of original dose 300 mg), Oral, Daily, 1 of 1 cycle, Start date: --, End date: --  Dose modification: 300 mg (original dose 300 mg, Cycle 0), 150 mg (original dose 300 mg, Cycle 0), 300 mg (original dose 300 mg, Cycle 0)     4/11/2023 Tumor Conference    Patient with progression of metastatic disease on current treatment on recent PET. Medical Oncology discussed treatment options and recommend trastuzumab.       5/19/2023 - 7/15/2023 Chemotherapy    Treatment Summary   Plan Name: OP SACITUZUMAB GOVITECAN-HZIY D1,15 Q4W (CHANGED IN C2)  Treatment Goal: Palliative  Status: Inactive  Start Date: 5/19/2023  End Date: 7/15/2023  Provider: Rosa Linn MD  Chemotherapy: sacituzumab govitecan-hziy (TRODELVY) 1,440 mg in sodium chloride 0.9% 500 mL chemo infusion, 1,524 mg, Intravenous, Clinic/HOD 1 time, 3 of 17 cycles  Administration: 1,440 mg (5/19/2023), 1,440 mg (6/9/2023), 1,440 mg (6/30/2023), 1,440 mg (7/14/2023)     8/30/2023 - 2/15/2024 Chemotherapy    Treatment Summary   Plan Name: OP SACITUZUMAB GOVITECAN-HZIY Q3W  Treatment Goal: Palliative  Status: Inactive  Start Date: 8/30/2023  End Date: 2/15/2024  Provider: Paul Durbin MD  Chemotherapy: sacituzumab govitecan-hziy (TRODELVY) 1,440 mg in sodium chloride 0.9% 500 mL chemo infusion,  1,493 mg, Intravenous, Clinic/HOD 1 time, 7 of 17 cycles  Administration: 1,440 mg (8/30/2023), 1,440 mg (9/13/2023), 1,400 mg (10/18/2023), 1,440 mg (9/29/2023), 1,440 mg (11/1/2023), 1,440 mg (12/13/2023), 1,440 mg (11/29/2023), 1,440 mg (1/3/2024), 1,440 mg (1/24/2024), 1,440 mg (2/14/2024)     3/6/2024 - 3/6/2024 Chemotherapy    Treatment Summary   Plan Name: ALPELISIB FULVESTRANT Q4W  Treatment Goal: Palliative  Status: Inactive  Start Date:   End Date:   Provider: Paul Durbin MD  Chemotherapy: fulvestrant (FASLODEX) injection 500 mg, 500 mg, Intramuscular, Clinic/HOD 1 time, 0 of 12 cycles  alpelisib (PIQRAY) 300 mg/day (150 mg x 2) Tab, 300 mg, Oral, Daily, 0 of 1 cycle, Start date: --, End date: --     3/6/2024 - 3/6/2024 Chemotherapy    Treatment Summary   Plan Name: OP BREAST FULVESTRANT  Treatment Goal: Palliative  Status: Inactive  Start Date:   End Date:   Provider: Paul Durbin MD  Chemotherapy: fulvestrant (FASLODEX) injection 500 mg, 500 mg, Intramuscular, Clinic/HOD 1 time, 0 of 12 cycles     3/21/2024 - 4/9/2024 Radiation Therapy    Treating physician: Pedro Corey    Site Technique Energy Dose/Fx (Gy) #Fx Total Dose (Gy)   Lt Orbit AP/Lt Lat 6X 3 10 / 10 30        5/9/2024 -  Chemotherapy    Treatment Summary   Plan Name: OP BREAST GEMCITABINE QW  Treatment Goal: Palliative  Status: Active  Start Date: 5/9/2024 (Planned)  End Date: 3/27/2025 (Planned)  Provider: Rosa Linn MD  Chemotherapy: gemcitabine (GEMZAR) 2,630 mg in sodium chloride 0.9% SolP 250 mL chemo infusion, 1,000 mg/m2 = 2,630 mg, Intravenous, Clinic/HOD 1 time, 0 of 12 cycles      Tumor Conference    No MDA clinical trials at this time. Need orbital mets stability for minimal of 6 months to consider.      The team discussed weekly Gemcitabine, as well as Eribulin. Can add Doxil     For brca mutation can add DNA damaging in that setting. There was     Will get updated PET scan this week               Bone metastases    6/10/2021 Initial Diagnosis    Bone metastases     3/6/2024 - 3/6/2024 Chemotherapy    Treatment Summary   Plan Name: ALPELISIB FULVESTRANT Q4W  Treatment Goal: Palliative  Status: Inactive  Start Date:   End Date:   Provider: Paul Durbin MD  Chemotherapy: fulvestrant (FASLODEX) injection 500 mg, 500 mg, Intramuscular, Clinic/HOD 1 time, 0 of 12 cycles  alpelisib (PIQRAY) 300 mg/day (150 mg x 2) Tab, 300 mg, Oral, Daily, 0 of 1 cycle, Start date: --, End date: --     3/6/2024 - 3/6/2024 Chemotherapy    Treatment Summary   Plan Name: OP BREAST FULVESTRANT  Treatment Goal: Palliative  Status: Inactive  Start Date:   End Date:   Provider: Paul Durbin MD  Chemotherapy: fulvestrant (FASLODEX) injection 500 mg, 500 mg, Intramuscular, Clinic/HOD 1 time, 0 of 12 cycles     5/9/2024 -  Chemotherapy    Treatment Summary   Plan Name: OP BREAST GEMCITABINE QW  Treatment Goal: Palliative  Status: Active  Start Date: 5/9/2024 (Planned)  End Date: 3/27/2025 (Planned)  Provider: Rosa Linn MD  Chemotherapy: gemcitabine (GEMZAR) 2,630 mg in sodium chloride 0.9% SolP 250 mL chemo infusion, 1,000 mg/m2 = 2,630 mg, Intravenous, Clinic/HOD 1 time, 0 of 12 cycles          REVIEW OF SYSTEMS:   Review of Systems   Constitutional:  Positive for weight loss. Negative for fever and malaise/fatigue.   HENT:  Negative for ear pain, hearing loss, sinus pain and sore throat.    Eyes:  Positive for blurred vision. Negative for double vision and pain.   Respiratory:  Negative for cough, hemoptysis, shortness of breath and wheezing.    Cardiovascular:  Negative for chest pain, palpitations and leg swelling.   Gastrointestinal:  Negative for abdominal pain, blood in stool, constipation, diarrhea, heartburn, nausea and vomiting.   Genitourinary:  Negative for dysuria, frequency, hematuria and urgency.   Musculoskeletal:  Positive for back pain and joint pain.   Skin:  Negative for itching and rash.   Neurological:  Negative for  tingling, focal weakness, seizures and headaches.   Psychiatric/Behavioral:  Positive for depression. The patient is nervous/anxious.        PAST MEDICAL HISTORY:  Past Medical History:   Diagnosis Date    Breast cancer     Cancer     R breast    Diabetes mellitus     HTN (hypertension)     Leg edema, right     Prediabetes     Seizures     at age 18 - stress related    Therapy     marital counseling       PAST SURGICAL HISTORY:  Past Surgical History:   Procedure Laterality Date    AXILLARY NODE DISSECTION Right 11/22/2019    Procedure: LYMPHADENECTOMY, AXILLARY RIGHT;  Surgeon: Shima Whyte MD;  Location: [facility];  Service: General;  Laterality: Right;    AXILLARY NODE DISSECTION Right 12/17/2019    Procedure: LYMPHADENECTOMY, AXILLARY;  Surgeon: Shima Whyte MD;  [facility];  Service: General;  Laterality: Right;    BREAST BIOPSY      EXCISION OF HYDROCELE Right 10/28/2022    Procedure: HYDROCELECTOMY;  Surgeon: Anthony oCrdero Jr., MD;  Location: [facility] OR [address] [place] [address];  Service: Urology;  Laterality: Right;  1 hour    INSERTION OF TUNNELED CENTRAL VENOUS CATHETER (CVC) WITH SUBCUTANEOUS PORT Left 5/24/2019    Procedure: QNAMLPQIN-OGCT-N-CATH;  Surgeon: Shima Whyte MD;  Location: [facility] OR [address] [place] [address];  Service: General;  Laterality: Left;    INSERTION OF TUNNELED CENTRAL VENOUS CATHETER (CVC) WITH SUBCUTANEOUS PORT Left 9/17/2021    Procedure: INSERTION, SINGLE LUMEN CATHETER WITH PORT, WITH FLUOROSCOPIC GUIDANCE, right;  Surgeon: Gloria Holliday MD;  [facility];  Service: General;  Laterality: Left;  rapid covid test    SENTINEL LYMPH NODE BIOPSY Right 11/22/2019    Procedure: BIOPSY, LYMPH NODE, SENTINEL RIGHT;  Surgeon: Shima Whyte MD;  Location: [facility];  Service: General;  Laterality: Right;    SIMPLE MASTECTOMY Right 11/22/2019    Procedure: MASTECTOMY, SIMPLE RIGHT (CONSENT AM OF) 2.5 hr case;  Surgeon: Shima Whyte MD;  Location: [facility];  Service: General;  Laterality:  Right;       ALLERGIES:   Review of patient's allergies indicates:  No Known Allergies    MEDICATIONS:  Current Outpatient Medications   Medication Sig    amLODIPine (NORVASC) 10 MG tablet TAKE 1 TABLET (10 MG) BY MOUTH EVERY DAY    bisacodyL (DULCOLAX) 5 mg EC tablet Take 1 tablet (5 mg total) by mouth daily as needed for Constipation.    buPROPion (WELLBUTRIN XL) 150 MG TB24 tablet Take 1 tablet (150 mg total) by mouth once daily.    calcium-vitamin D3 (OYSTER SHELL CALCIUM-VIT D3) 500 mg-5 mcg (200 unit) per tablet Take 1 tablet by mouth 2 (two) times daily.    diphenoxylate-atropine 2.5-0.025 mg (LOMOTIL) 2.5-0.025 mg per tablet Take 1 tablet by mouth 4 (four) times daily as needed for Diarrhea.    dulaglutide (TRULICITY) 1.5 mg/0.5 mL pen injector Inject 1.5 mg into the skin once a week.    FLUoxetine 40 MG capsule Take 2 capsules (80 mg total) by mouth once daily.    gabapentin (NEURONTIN) 300 MG capsule Take 3 capsules (900 mg total) by mouth 3 (three) times daily.    hydroCHLOROthiazide (HYDRODIURIL) 25 MG tablet TAKE 1 TABLET BY MOUTH ONCE DAILY    HYDROcodone-acetaminophen (NORCO)  mg per tablet Take 1 tablet by mouth every 8 (eight) hours as needed for Pain.    ibuprofen (ADVIL,MOTRIN) 600 MG tablet Take 1 tablet (600 mg total) by mouth every 8 (eight) hours as needed for Pain.    lancets 33 gauge Misc 1 lancet by Misc.(Non-Drug; Combo Route) route once daily.    LIDOcaine (LIDODERM) 5 % Place 1 patch onto the skin once daily. Remove & Discard patch within 12 hours or as directed by MD    loratadine (CLARITIN) 10 mg tablet Take 1 tablet (10 mg total) by mouth once daily.    losartan (COZAAR) 50 MG tablet Take 2 tablets by mouth once daily    metFORMIN (GLUCOPHAGE) 500 MG tablet Take 2 tablets (1,000 mg total) by mouth 2 (two) times daily with meals. Needs appointment for future refills    morphine (MS CONTIN) 15 MG 12 hr tablet Take 1 tablet (15 mg total) by mouth 2 (two) times daily.     "nortriptyline (PAMELOR) 25 MG capsule Take 1 capsule (25 mg total) by mouth every evening.    pantoprazole (PROTONIX) 40 MG tablet Take 1 tablet (40 mg total) by mouth once daily. While taking dexamethasone    pen needle, diabetic (BD ULTRA-FINE TANESHA PEN NEEDLE) 32 gauge x 5/32" Ndle Use as directed with novolog and levemir (4 times daily)    ribociclib (KISQALI) 600 mg/day (200 mg x 3) Tab Take 600 mg by mouth once daily.    ALPRAZolam (XANAX) 0.5 MG tablet Take 1 tablet (0.5 mg total) by mouth 3 (three) times daily as needed for Insomnia or Anxiety.    ferrous sulfate (IRON) 325 mg (65 mg iron) Tab tablet Take 1 tablet (325 mg total) by mouth once daily.    insulin detemir U-100, Levemir, (LEVEMIR FLEXPEN) 100 unit/mL (3 mL) InPn pen Inject 11 Units into the skin every evening. Patient had low blood sugar while in-patient, decreasing dose (Patient not taking: Reported on 5/9/2024)    insulin lispro (HUMALOG KWIKPEN INSULIN) 100 unit/mL pen Inject 5 Units into the skin 3 (three) times daily. (Patient not taking: Reported on 5/9/2024)    lancing device Misc 1 Device by Misc.(Non-Drug; Combo Route) route 2 (two) times daily with meals.    LIDOcaine (LIDODERM) 5 % Place 1 patch onto the skin once daily. Remove & Discard patch within 12 hours or as directed by MD    OLANZapine (ZYPREXA) 5 MG tablet Take 1 tablet (5 mg total) by mouth every evening. days 1-4 of each chemotherapy cycle. (Patient not taking: Reported on 5/9/2024)    ondansetron (ZOFRAN-ODT) 8 MG TbDL Dissolve 1 tablet (8 mg total) by mouth or dissolve on tongue every 8 (eight) hours as needed (nausea/vomiting).  Let saliva dissolve tablet. (Patient not taking: Reported on 5/9/2024)    tadalafiL (CIALIS) 5 MG tablet Take 2 tablets (10 mg total) by mouth daily as needed for Erectile Dysfunction. (Patient not taking: Reported on 3/26/2024)     No current facility-administered medications for this visit.       SOCIAL HISTORY:  Social History     Socioeconomic " History    Marital status: Single    Number of children: 3   Occupational History    Occupation:    Tobacco Use    Smoking status: Former     Current packs/day: 0.00     Average packs/day: 0.3 packs/day for 15.0 years (3.8 ttl pk-yrs)     Types: Cigarettes, Vaping with nicotine     Start date: 1999     Quit date: 2014     Years since quittin.4    Smokeless tobacco: Never    Tobacco comments:     Social smoker with alcohol    Substance and Sexual Activity    Alcohol use: Yes     Alcohol/week: 0.0 standard drinks of alcohol     Comment: Rarely    Drug use: Not Currently     Types: Marijuana    Sexual activity: Yes     Partners: Female     Birth control/protection: None   Social History Narrative    From [place]    Lives with wife    3 sons in college    Wife recent loss of 6 month pregnancy (May 1, 2019)     Social Determinants of Health     Financial Resource Strain: Low Risk  (2024)    Overall Financial Resource Strain (CARDIA)     Difficulty of Paying Living Expenses: Not very hard   Food Insecurity: No Food Insecurity (2024)    Hunger Vital Sign     Worried About Running Out of Food in the Last Year: Never true     Ran Out of Food in the Last Year: Never true   Transportation Needs: Unmet Transportation Needs (2024)    PRAPARE - Transportation     Lack of Transportation (Medical): Yes     Lack of Transportation (Non-Medical): Yes   Physical Activity: Insufficiently Active (2024)    Exercise Vital Sign     Days of Exercise per Week: 3 days     Minutes of Exercise per Session: 20 min   Stress: Stress Concern Present (2024)    [employer] [place] [employer] - Occupational Stress Questionnaire     Feeling of Stress : To some extent   Housing Stability: High Risk (2024)    Housing Stability Vital Sign     Unable to Pay for Housing in the Last Year: Yes     Number of Places Lived in the Last Year: 1     Unstable Housing in the Last Year: No       FAMILY  HISTORY:  Family History   Problem Relation Name Age of Onset    Hypertension Mother      Diabetes Mother      Hypertension Father      Lupus Father      Post-traumatic stress disorder Brother      No Known Problems Maternal Grandmother      Colon cancer Maternal Grandfather      Breast cancer Paternal Grandmother      No Known Problems Paternal Grandfather      No Known Problems Sister      No Known Problems Maternal Aunt      No Known Problems Maternal Uncle      Fibromyalgia Paternal Aunt      Lupus Paternal Aunt      No Known Problems Paternal Uncle      No Known Problems Brother      No Known Problems Sister      No Known Problems Son      No Known Problems Son      No Known Problems Son      No Known Problems Son           PHYSICAL EXAMINATION:  Vitals:    24 0904   BP: 130/75   Pulse: 87     Body mass index is 38.41 kg/m².    ECO  Physical Exam  Constitutional:       Appearance: Normal appearance.      Comments: Ambulates with cane   HENT:      Head: Normocephalic and atraumatic.      Comments: Diminished vision in the left eye     Nose: Nose normal.      Mouth/Throat:      Mouth: Mucous membranes are moist.   Cardiovascular:      Rate and Rhythm: Normal rate.   Pulmonary:      Effort: Pulmonary effort is normal.   Abdominal:      General: Abdomen is flat.      Palpations: Abdomen is soft.   Musculoskeletal:         General: Normal range of motion.      Cervical back: Normal range of motion.   Skin:     General: Skin is warm and dry.   Neurological:      General: No focal deficit present.      Mental Status: He is alert. Mental status is at baseline.             ASSESSMENT/PLAN:  Paul was seen today for consult.    Diagnoses and all orders for this visit:    Malignant neoplasm involving both nipple and areola of right breast in male, estrogen receptor negative  -     Ambulatory referral/consult to Radiation Oncology    Bone metastases  -     Ambulatory referral/consult to Radiation  Oncology    Secondary malignant neoplasm of brain  -     Ambulatory referral/consult to Radiation Oncology      Patient scheduled for PET scan today.  Will use pet results to guide radiation treatment matias.  Will plan on treating spine to a dose of 20 Gy in 5 fractions over a week.    The risks and benefits of treatment have been discussed with the patient and he expressed full understanding. he understands the treatment plan and willing to proceed accordingly.    I spent approximately 60 minutes reviewing the available records and evaluating the patient, out of which over 50% of the time was spent face to face with the patient in counseling and coordinating this patient's care.

## 2024-05-09 ENCOUNTER — OFFICE VISIT (OUTPATIENT)
Dept: RADIATION ONCOLOGY | Facility: CLINIC | Age: 49
End: 2024-05-09
Payer: MEDICARE

## 2024-05-09 ENCOUNTER — OFFICE VISIT (OUTPATIENT)
Dept: HEMATOLOGY/ONCOLOGY | Facility: CLINIC | Age: 49
End: 2024-05-09
Payer: MEDICARE

## 2024-05-09 ENCOUNTER — HOSPITAL ENCOUNTER (OUTPATIENT)
Dept: RADIOLOGY | Facility: HOSPITAL | Age: 49
Discharge: HOME | End: 2024-05-09
Attending: INTERNAL MEDICINE
Payer: MEDICARE

## 2024-05-09 VITALS
WEIGHT: 291.13 LBS | OXYGEN SATURATION: 98 % | HEIGHT: 73 IN | DIASTOLIC BLOOD PRESSURE: 75 MMHG | HEART RATE: 79 BPM | BODY MASS INDEX: 38.74 KG/M2 | DIASTOLIC BLOOD PRESSURE: 64 MMHG | RESPIRATION RATE: 17 BRPM | TEMPERATURE: 97 F | OXYGEN SATURATION: 96 % | BODY MASS INDEX: 38.58 KG/M2 | HEIGHT: 73 IN | HEART RATE: 87 BPM | SYSTOLIC BLOOD PRESSURE: 130 MMHG | WEIGHT: 292.31 LBS | SYSTOLIC BLOOD PRESSURE: 127 MMHG

## 2024-05-09 DIAGNOSIS — C50.021 MALIGNANT NEOPLASM INVOLVING BOTH NIPPLE AND AREOLA OF RIGHT BREAST IN MALE, ESTROGEN RECEPTOR NEGATIVE: Primary | ICD-10-CM

## 2024-05-09 DIAGNOSIS — F43.23 ADJUSTMENT DISORDER WITH MIXED ANXIETY AND DEPRESSED MOOD: ICD-10-CM

## 2024-05-09 DIAGNOSIS — C79.51 MALIGNANT NEOPLASM METASTATIC TO BONE: ICD-10-CM

## 2024-05-09 DIAGNOSIS — C79.31 SECONDARY MALIGNANT NEOPLASM OF BRAIN: ICD-10-CM

## 2024-05-09 DIAGNOSIS — Z17.1 MALIGNANT NEOPLASM INVOLVING BOTH NIPPLE AND AREOLA OF RIGHT BREAST IN MALE, ESTROGEN RECEPTOR NEGATIVE: ICD-10-CM

## 2024-05-09 DIAGNOSIS — Z17.1 MALIGNANT NEOPLASM INVOLVING BOTH NIPPLE AND AREOLA OF RIGHT BREAST IN MALE, ESTROGEN RECEPTOR NEGATIVE: Primary | ICD-10-CM

## 2024-05-09 DIAGNOSIS — Z95.828 PORT-A-CATH IN PLACE: ICD-10-CM

## 2024-05-09 DIAGNOSIS — C50.021 MALIGNANT NEOPLASM INVOLVING BOTH NIPPLE AND AREOLA OF RIGHT BREAST IN MALE, ESTROGEN RECEPTOR NEGATIVE: ICD-10-CM

## 2024-05-09 DIAGNOSIS — R11.0 CHEMOTHERAPY-INDUCED NAUSEA: ICD-10-CM

## 2024-05-09 DIAGNOSIS — T45.1X5A CHEMOTHERAPY-INDUCED NAUSEA: ICD-10-CM

## 2024-05-09 DIAGNOSIS — F32.0 CURRENT MILD EPISODE OF MAJOR DEPRESSIVE DISORDER WITHOUT PRIOR EPISODE: ICD-10-CM

## 2024-05-09 DIAGNOSIS — G89.3 NEOPLASM RELATED PAIN: ICD-10-CM

## 2024-05-09 DIAGNOSIS — D63.0 ANEMIA IN NEOPLASTIC DISEASE: ICD-10-CM

## 2024-05-09 DIAGNOSIS — G62.9 NEUROPATHY: ICD-10-CM

## 2024-05-09 LAB — POCT GLUCOSE: 86 MG/DL (ref 70–110)

## 2024-05-09 PROCEDURE — 3078F DIAST BP <80 MM HG: CPT | Mod: CPTII,S$GLB,, | Performed by: RADIOLOGY

## 2024-05-09 PROCEDURE — 3008F BODY MASS INDEX DOCD: CPT | Mod: CPTII,S$GLB,, | Performed by: RADIOLOGY

## 2024-05-09 PROCEDURE — 78815 PET IMAGE W/CT SKULL-THIGH: CPT | Mod: 26,PS,, | Performed by: STUDENT IN AN ORGANIZED HEALTH CARE EDUCATION/TRAINING PROGRAM

## 2024-05-09 PROCEDURE — 1159F MED LIST DOCD IN RCRD: CPT | Mod: CPTII,S$GLB,, | Performed by: NURSE PRACTITIONER

## 2024-05-09 PROCEDURE — 3074F SYST BP LT 130 MM HG: CPT | Mod: CPTII,S$GLB,, | Performed by: NURSE PRACTITIONER

## 2024-05-09 PROCEDURE — 3008F BODY MASS INDEX DOCD: CPT | Mod: CPTII,S$GLB,, | Performed by: NURSE PRACTITIONER

## 2024-05-09 PROCEDURE — 4010F ACE/ARB THERAPY RXD/TAKEN: CPT | Mod: CPTII,S$GLB,, | Performed by: RADIOLOGY

## 2024-05-09 PROCEDURE — 99215 OFFICE O/P EST HI 40 MIN: CPT | Mod: S$GLB,,, | Performed by: NURSE PRACTITIONER

## 2024-05-09 PROCEDURE — 3078F DIAST BP <80 MM HG: CPT | Mod: CPTII,S$GLB,, | Performed by: NURSE PRACTITIONER

## 2024-05-09 PROCEDURE — 78815 PET IMAGE W/CT SKULL-THIGH: CPT | Mod: TC

## 2024-05-09 PROCEDURE — 99999 PR PBB SHADOW E&M-EST. PATIENT-LVL V: CPT | Mod: PBBFAC,,, | Performed by: NURSE PRACTITIONER

## 2024-05-09 PROCEDURE — 99999 PR PBB SHADOW E&M-EST. PATIENT-LVL V: CPT | Mod: PBBFAC,,, | Performed by: RADIOLOGY

## 2024-05-09 PROCEDURE — 3075F SYST BP GE 130 - 139MM HG: CPT | Mod: CPTII,S$GLB,, | Performed by: RADIOLOGY

## 2024-05-09 PROCEDURE — 99215 OFFICE O/P EST HI 40 MIN: CPT | Mod: S$GLB,,, | Performed by: RADIOLOGY

## 2024-05-09 PROCEDURE — 1159F MED LIST DOCD IN RCRD: CPT | Mod: CPTII,S$GLB,, | Performed by: RADIOLOGY

## 2024-05-09 PROCEDURE — A9552 F18 FDG: HCPCS | Performed by: INTERNAL MEDICINE

## 2024-05-09 PROCEDURE — 4010F ACE/ARB THERAPY RXD/TAKEN: CPT | Mod: CPTII,S$GLB,, | Performed by: NURSE PRACTITIONER

## 2024-05-09 RX ORDER — HYDROCODONE BITARTRATE AND ACETAMINOPHEN 10; 325 MG/1; MG/1
1 TABLET ORAL EVERY 8 HOURS PRN
Qty: 90 TABLET | Refills: 0 | Status: SHIPPED | OUTPATIENT
Start: 2024-05-09

## 2024-05-09 RX ORDER — FLUOXETINE HYDROCHLORIDE 40 MG/1
80 CAPSULE ORAL DAILY
Qty: 60 CAPSULE | Refills: 0 | Status: SHIPPED | OUTPATIENT
Start: 2024-05-09 | End: 2024-06-04 | Stop reason: SDUPTHER

## 2024-05-09 RX ORDER — ONDANSETRON 8 MG/1
8 TABLET, ORALLY DISINTEGRATING ORAL EVERY 8 HOURS PRN
Qty: 30 TABLET | Refills: 2 | Status: SHIPPED | OUTPATIENT
Start: 2024-05-09 | End: 2025-05-09

## 2024-05-09 RX ORDER — FLUDEOXYGLUCOSE F18 500 MCI/ML
12 INJECTION INTRAVENOUS ONCE
Status: COMPLETED | OUTPATIENT
Start: 2024-05-09 | End: 2024-05-09

## 2024-05-09 RX ADMIN — FLUDEOXYGLUCOSE F-18 12.17 MILLICURIE: 500 INJECTION INTRAVENOUS at 11:05

## 2024-05-09 NOTE — PROGRESS NOTES
Subjective     Patient ID: Paul Li Jr. is a 48 y.o. male.    Chief Complaint: Malignant neoplasm involving both nipple and areola of righ    HPI      Here to consent for Gemzar.     30 lb weight loss since June 2023, reports most occurred recently  Energy level poor - fatigue post XRT  Continues to have back pain (tailbone)- saw RadOnc today and simulation ordered. Dr. Lindquist would like to review PET prior to setting up XRT.   Taking: MS Contin 15mg q 12 hours, Norco as needed - taking one every 3-4 days.   Leg strength was better last week but pain in back causes some weakness in legs.   Left eye vision has improved. Seeing clear spots but not able to see detail.   Urinating well and bowels normal.   Denies fevers or chills  but sporadic cold sweats.   Gets winded at times.   No CP         Oncology History  Malignant neoplasm involving both nipple and areola of right breast in male, estrogen receptor negative- initially Stage IB, now metastatic  Metastatic Triple Negative Breast Cancer: progressed from prior Stage IB   5/2/2019: Right breast retroareolar core biopsy  S/p Right breast mastectomy with axillary lymph node dissection in 2019  S/p neoadjuvant ddAC/carbo-taxol, adjuvant xeloda, XRT   Recurrence with bone metastasis in 8/2021, received abraxane and progressed on it in 3/2023  Started on Trodelvy in 5/2023 - ongoing  - His schedule of trodelvy is [id number] with growth factor support due to   neutropenia   Liquid biopsy: PIK3CA mutation  PDL 1 <1%  Precerted for Sacituzumab govitecan-hziy-for a transfer of facility  - PET CT 10/10/23:               - reviewed with previous images  - upon our review, exam is stable except subcarinal LN increase in size (max SUV of 10.9 measuring 2.4 x 2.7 cm, previously was 1.5cm with max SUV 10)  - MRI C spine from 12/15/23 reviewed, shows degenerative changes but no concerning metastatic lesions. Reports for lumbar spine and thoracic spine not uploaded, have requested  from [facility]. Reviewed images, show known T6 lesion (seen on PET scan Oct 2023) but no other clear lesions.      1.  Metastatic Breast Cancer:              1.  Right Breast Cancer Stage IB (pT1b pN1):  T=5 mm, G=2, N=1/15, ER=negative, MS=negative. [id number]: IHC 0, FISH=.  Ki67=80%.  MammaPrint=.  Date: 5/2/2019                          A.  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  Biopsy: 5/2/2019: Right breast retroareolar core biopsy                          C.  Staging Studies:                          D.  NeoAdjuvant Chemo:                                       ddAC x 4 / TaxolCarbo wkly x 12.   5/27/2019 - 10/15/2019                          E.  Surgery:                                      11/22/2019: R Mastectomy/SLN Dr Whyte: Right breast mastectomy with SLN biopsy no LVI with 1 LN positive 4 mm, negative margins.                                      12/17/2019:  ALND  0/14 positive for carcinoma.                          F.  XRT: 2/3/2020- 3/23/2020: Total Dose: 50.4 Gy to the chest wall and regional lymphatic                          G.  Adjuvant Chemo:                                        Xeloda 1000 mg/m2 bid days 1-14 every 21 day x 6.   4/15/2020 - 9/28/20                 2.  Recurrence:  7/2/2021.                          A.  Biopsy:  Left iliac bone biopsy: 7/2/2021: Positive for metastatic breast ca                          B.  Staging:                                      MRI T/L spine 6/3/2021:  Abnormal  T12, S1, S2 vertebral bodies, LEFT sacrum and ilium concerning for metastatic disease.                                       PET 6/9/2021: Multiple sclerotic lesions in the T12 vertebral body, sacrum, and left iliac bone with hypermetabolic activity concerning for active metastatic disease.                                      C.  Treatment:                                      Abraxane:  8/13/2021 - 3/25/2023.     2.  Genetic Testing:  Genetic  "testing: AXIN2, RNF43 (VUS) identified     TREATMENT PLAN:                 SACITUZUMAB q 2wk with Neulasta support:                            5/19/2023 - 7/14/2023.                            Resumed 8/30/2023 - .                 Zometa q 4 wkly a:  8/2022 - .                  Liquid biopsy:                            PIK3CA mutation                          PDL-1 <1%.     Restaging:   PET: 2/15/2022: stable osseous sclerotic lesions and pulmonary micro nodules  PET 6/6/2022: stable   PET 11/4/2022:stable  PET 3/20/2023: New derick metastasis (right hilar node). Otherwise stable  MRI sacrum/coccyx 3/29/2023: Extensive osseous metastatic disease of the pelvis involving much of the sacrum.  No evidence for invasion of the sacral canal or neural foramina.  MRI L-spine 3/29/2023: Dffuse osseous metastatic disease throughout the lumbar spine.  PET 6/22/2023: Findings suggestive of disease progression, noting worsening osseous metastatic disease and subcarinal/right hilar lymphadenopathy.  PET 10/10/2023:  Stable except subcarinal LN increase per PET.  CT images are not convincing.    Moved to [place] 6/2023  Moved  back from [place] 2/2024- see summary below.         Diagnosis: Metastatic triple negative male breast cancer, BRCA1 +, progression  His most recent systemic treatment regimen was sacituzumab-govetican Q3w and he received last dose on 2/12/2024  Last PET there per notes revealed progression on 2/27/2024 and no systemic treatment since     Patient moved to [place] in June of 2023 and recently returned to [place] to re-establish care after sudden left eye blindness- he woke up that AM with acute of chronic vision loss, left orbital pain and exophthalmos  He was admitted on return from 3/20- 3/22/2024   In the ED a stroke code was activated and CTA demonstrated "Irregularity of the intracranial vertebral arteries and left PCA as above, likely atherosclerotic disease.  No high-grade stenosis or large " "vessel occlusion. Osseous metastatic disease similar to prior outside MRI." Opthalmology was consulted who recommended a orbital MRI which demonstrated " There is abnormal soft tissue lesion in the posterior left orbital apex/optic canal which is adjacent to or involving the inferior rectus and medial rectus extra-ocular muscles with associated enhancement, and could represent metastatic disease.  This abuts and compresses the posterior optic nerve at the posterior left orbital apex. There is minimal thickening and increased T2 signal with probable minimal enhancement of the anterior left optic nerve that could represent mild optic neuritis."      He has now completed XRT       Most recent imaging:  - 2/27/2024 PET- not visible in records and will obtain     - 3/20/2024 MRI orbits:  FINDINGS:  Orbits/Optic Nerves:  Minimal thickening and increased T2 signal with probable minimal enhancement of the left optic nerve could represent mild optic neuritis.  No significant abnormality on the right.  Globes appear within normal limits.  Remainder of the Intracranial Compartment:  No midline shift or hydrocephalus.  No extra-axial blood or fluid collections.  The brain parenchyma appears normal. No cerebral mass lesion, acute hemorrhage, edema, or acute infarct.  There is abnormal soft tissue lesion in the posterior left orbital apex and optic canal, which is adjacent to or involving the inferior rectus and medial rectus extra-ocular muscles.  There is mild associated enhancement.  This could represent metastatic disease.  Minimal associated restricted diffusion on axial 35 of series 6.  This abuts and may compress the posterior optic nerve at the posterior left orbital apex.  Normal vascular flow voids are preserved.  Mild bilateral ethmoid and right maxillary sinus disease.  Skull/Extracranial Contents (limited evaluation): Bone marrow signal intensity is normal.  Impression:  1. There is abnormal soft tissue lesion in the " posterior left orbital apex/optic canal which is adjacent to or involving the inferior rectus and medial rectus extra-ocular muscles with associated enhancement, and could represent metastatic disease.  This abuts and compresses the posterior optic nerve at the posterior left orbital apex.  2. There is minimal thickening and increased T2 signal with probable minimal enhancement of the anterior left optic nerve that could represent mild optic neuritis.  Follow-up recommended.  3. Mild paranasal sinus disease.  4. This report was flagged in Epic as abnormal.        Review of Systems       Objective     Physical Exam  Constitutional:       Comments: Presents alone   In WC but usually walks with cane   HENT:      Nose: Nose normal.   Eyes:      Pupils: Pupils are equal, round, and reactive to light.   Cardiovascular:      Rate and Rhythm: Normal rate and regular rhythm.   Pulmonary:      Effort: Pulmonary effort is normal.      Breath sounds: Normal breath sounds.   Abdominal:      General: Bowel sounds are normal.   Musculoskeletal:      Comments: ROM limited due to back pain and generalized weakness   Neurological:      Mental Status: He is alert and oriented to person, place, and time.            Assessment and Plan     1. Malignant neoplasm involving both nipple and areola of right breast in male, estrogen receptor negative  CBC Oncology    Comprehensive Metabolic Panel    CBC Oncology    Comprehensive Metabolic Panel      2. Bone metastases        3. Secondary malignant neoplasm of brain        4. Port-A-Cath in place         5. Adjustment disorder with mixed anxiety and depressed mood        6. Neuropathy        7. Neoplasm related pain  HYDROcodone-acetaminophen (NORCO)  mg per tablet      8. Anemia in neoplastic disease        9. Chemotherapy-induced nausea  ondansetron (ZOFRAN-ODT) 8 MG TbDL              Full chart review  Plan to start weekly gemzar. Consented.   Zometa as well  PET today  Labs  today  F/u with rad Onc for palliative XRT to back. Once done, will order PT for strength training.   F/u with palliative care.   Refill norco.   Rx zofran to have prn  Discussed nutrition. He is seeing dietician    Route Chart for Scheduling    Med Onc Chart Routing  Urgent    Follow up with physician    Follow up with [facility] . See me for second week of  Gemzar (C1D8)  with labs   Infusion scheduling note   start weekly GEMZAR asap; Zometa every 4 weeks   Injection scheduling note    Labs CBC and CMP   Scheduling:  Preferred lab:  Lab interval:     Imaging    Pharmacy appointment    Other referrals                Treatment Plan Information   OP BREAST GEMCITABINE QW   Rosa Linn MD   Upcoming Treatment Dates - OP BREAST GEMCITABINE QW    5/9/2024       Pre-Medications       dexAMETHasone (DECADRON) 10 mg in sodium chloride 0.9% 50 mL IVPB       Chemotherapy       gemcitabine (GEMZAR) 2,630 mg in sodium chloride 0.9% SolP 250 mL chemo infusion  5/16/2024       Pre-Medications       dexAMETHasone (DECADRON) 10 mg in sodium chloride 0.9% 50 mL IVPB       Chemotherapy       gemcitabine (GEMZAR) 2,630 mg in sodium chloride 0.9% SolP 250 mL chemo infusion  5/23/2024       Pre-Medications       dexAMETHasone (DECADRON) 10 mg in sodium chloride 0.9% 50 mL IVPB       Chemotherapy       gemcitabine (GEMZAR) 2,630 mg in sodium chloride 0.9% SolP 250 mL chemo infusion  6/6/2024       Pre-Medications       dexAMETHasone (DECADRON) 10 mg in sodium chloride 0.9% 50 mL IVPB       Chemotherapy       gemcitabine (GEMZAR) 2,630 mg in sodium chloride 0.9% SolP 250 mL chemo infusion    Supportive Plan Information  OP FILGRASTIM 480 MCG   Michelle Grayson NP   Upcoming Treatment Dates - OP FILGRASTIM 480 MCG    No upcoming days in selected categories.    Therapy Plan Information  PORT FLUSH  Flushes  heparin, porcine (PF) 100 unit/mL injection flush 500 Units  500 Units, Intravenous, Every visit  sodium chloride 0.9% flush 10 mL  10  mL, Intravenous, Every visit    ZOLEDRONIC ACID (ZOMETA) IV  Medications  zoledronic acid (ZOMETA) 4 mg in sodium chloride 0.9% 100 mL IVPB  4 mg, Intravenous, Every 4 weeks  Flushes  sodium chloride 0.9% 250 mL flush bag  Intravenous, Every 4 weeks  sodium chloride 0.9% flush 10 mL  10 mL, Intravenous, Every 4 weeks  heparin, porcine (PF) 100 unit/mL injection flush 500 Units  500 Units, Intravenous, Every 4 weeks  alteplase injection 2 mg  2 mg, Intra-Catheter, Every 4 weeks             Patient is in agreement with the proposed treatment plan. All questions were answered to the patient's satisfaction. Pt knows to call clinic for any new or worsening symptoms and if anything is needed before the next clinic visit.    Richard Keys, MSN, APRN, FNP-C  Nurse Practitioner to Dr. Rosa Linn  Lead [facility]  [facility]  Hematology & Medical Oncology  [address] [place] [id number]  ph. [phone] ext [phone]  Fax. [phone]              40 minutes of total time spent on the encounter, which includes face to face time and non-face to face time preparing to see the patient (eg, review of tests), Obtaining and/or reviewing separately obtained history, Documenting clinical information in the electronic or other health record, Independently interpreting results (not separately reported) and communicating results to the patient/family/caregiver, or Care coordination (not separately reported).

## 2024-05-09 NOTE — Clinical Note
He has simulation tomorrow and venita wants to look at PET before radiating back. Can I get him started on the Gemzar regardless.

## 2024-05-10 ENCOUNTER — HOSPITAL ENCOUNTER (OUTPATIENT)
Dept: RADIATION THERAPY | Facility: HOSPITAL | Age: 49
Discharge: HOME | End: 2024-05-10
Attending: RADIOLOGY
Payer: MEDICARE

## 2024-05-10 PROCEDURE — 77334 RADIATION TREATMENT AID(S): CPT | Mod: 26,,, | Performed by: RADIOLOGY

## 2024-05-10 PROCEDURE — 77290 THER RAD SIMULAJ FIELD CPLX: CPT | Mod: TC | Performed by: RADIOLOGY

## 2024-05-10 PROCEDURE — 77334 RADIATION TREATMENT AID(S): CPT | Mod: TC | Performed by: RADIOLOGY

## 2024-05-10 PROCEDURE — 77014 HC CT GUIDANCE RADIATION THERAPY FLDS PLACEMENT: CPT | Mod: TC | Performed by: RADIOLOGY

## 2024-05-10 PROCEDURE — 77263 THER RADIOLOGY TX PLNG CPLX: CPT | Mod: ,,, | Performed by: RADIOLOGY

## 2024-05-10 PROCEDURE — 77290 THER RAD SIMULAJ FIELD CPLX: CPT | Mod: 26,,, | Performed by: RADIOLOGY

## 2024-05-14 ENCOUNTER — TELEPHONE (OUTPATIENT)
Dept: HEMATOLOGY/ONCOLOGY | Facility: CLINIC | Age: 49
End: 2024-05-14

## 2024-05-14 ENCOUNTER — PATIENT MESSAGE (OUTPATIENT)
Dept: OTHER | Facility: OTHER | Age: 49
End: 2024-05-14
Payer: MEDICARE

## 2024-05-14 NOTE — TELEPHONE ENCOUNTER
Called patient when he did not sign in within 10 minutes of scheduled virtual visit. Unable to leave message on cell phone (mailbox full) and home number was not in service.

## 2024-05-16 DIAGNOSIS — C79.51 BONE METASTASES: ICD-10-CM

## 2024-05-16 DIAGNOSIS — G89.3 NEOPLASM RELATED PAIN: ICD-10-CM

## 2024-05-16 PROCEDURE — 77334 RADIATION TREATMENT AID(S): CPT | Mod: TC | Performed by: RADIOLOGY

## 2024-05-16 PROCEDURE — 77300 RADIATION THERAPY DOSE PLAN: CPT | Mod: TC | Performed by: RADIOLOGY

## 2024-05-16 PROCEDURE — 77295 3-D RADIOTHERAPY PLAN: CPT | Mod: 26,,, | Performed by: RADIOLOGY

## 2024-05-16 PROCEDURE — 77300 RADIATION THERAPY DOSE PLAN: CPT | Mod: 26,,, | Performed by: RADIOLOGY

## 2024-05-16 PROCEDURE — 77334 RADIATION TREATMENT AID(S): CPT | Mod: 26,,, | Performed by: RADIOLOGY

## 2024-05-16 PROCEDURE — 77295 3-D RADIOTHERAPY PLAN: CPT | Mod: TC | Performed by: RADIOLOGY

## 2024-05-16 RX ORDER — LIDOCAINE 50 MG/G
1 PATCH TOPICAL DAILY
Qty: 7 PATCH | Refills: 0 | Status: SHIPPED | OUTPATIENT
Start: 2024-05-16

## 2024-05-17 ENCOUNTER — PATIENT MESSAGE (OUTPATIENT)
Dept: HEMATOLOGY/ONCOLOGY | Facility: CLINIC | Age: 49
End: 2024-05-17
Payer: MEDICARE

## 2024-05-17 PROCEDURE — G6002 STEREOSCOPIC X-RAY GUIDANCE: HCPCS | Mod: 26,,, | Performed by: RADIOLOGY

## 2024-05-17 PROCEDURE — 77412 RADIATION TX DELIVERY LVL 3: CPT | Performed by: RADIOLOGY

## 2024-05-17 PROCEDURE — 77427 RADIATION TX MANAGEMENT X5: CPT | Mod: ,,, | Performed by: RADIOLOGY

## 2024-05-17 PROCEDURE — 77417 THER RADIOLOGY PORT IMAGE(S): CPT | Performed by: RADIOLOGY

## 2024-05-17 PROCEDURE — 77387 GUIDANCE FOR RADJ TX DLVR: CPT | Mod: TC | Performed by: RADIOLOGY

## 2024-05-20 ENCOUNTER — TELEPHONE (OUTPATIENT)
Dept: HEMATOLOGY/ONCOLOGY | Facility: CLINIC | Age: 49
End: 2024-05-20
Payer: MEDICARE

## 2024-05-20 ENCOUNTER — PATIENT MESSAGE (OUTPATIENT)
Dept: HEMATOLOGY/ONCOLOGY | Facility: CLINIC | Age: 49
End: 2024-05-20
Payer: MEDICARE

## 2024-05-20 PROCEDURE — 77412 RADIATION TX DELIVERY LVL 3: CPT | Performed by: RADIOLOGY

## 2024-05-20 PROCEDURE — 77387 GUIDANCE FOR RADJ TX DLVR: CPT | Mod: TC | Performed by: RADIOLOGY

## 2024-05-20 PROCEDURE — G6002 STEREOSCOPIC X-RAY GUIDANCE: HCPCS | Mod: 26,,, | Performed by: RADIOLOGY

## 2024-05-21 ENCOUNTER — PATIENT MESSAGE (OUTPATIENT)
Dept: PALLIATIVE MEDICINE | Facility: CLINIC | Age: 49
End: 2024-05-21
Payer: MEDICARE

## 2024-05-21 DIAGNOSIS — C50.021 MALIGNANT NEOPLASM INVOLVING BOTH NIPPLE AND AREOLA OF RIGHT BREAST IN MALE, ESTROGEN RECEPTOR NEGATIVE: ICD-10-CM

## 2024-05-21 DIAGNOSIS — C79.51 MALIGNANT NEOPLASM METASTATIC TO BONE: ICD-10-CM

## 2024-05-21 DIAGNOSIS — G89.3 NEOPLASM RELATED PAIN: ICD-10-CM

## 2024-05-21 DIAGNOSIS — Z17.1 MALIGNANT NEOPLASM INVOLVING BOTH NIPPLE AND AREOLA OF RIGHT BREAST IN MALE, ESTROGEN RECEPTOR NEGATIVE: ICD-10-CM

## 2024-05-21 PROCEDURE — G6002 STEREOSCOPIC X-RAY GUIDANCE: HCPCS | Mod: 26,,, | Performed by: RADIOLOGY

## 2024-05-21 PROCEDURE — 77387 GUIDANCE FOR RADJ TX DLVR: CPT | Mod: TC | Performed by: RADIOLOGY

## 2024-05-21 PROCEDURE — 77412 RADIATION TX DELIVERY LVL 3: CPT | Performed by: RADIOLOGY

## 2024-05-21 RX ORDER — IBUPROFEN 600 MG/1
600 TABLET ORAL EVERY 8 HOURS PRN
Qty: 20 TABLET | Refills: 0 | Status: SHIPPED | OUTPATIENT
Start: 2024-05-21

## 2024-05-21 RX ORDER — MORPHINE SULFATE 15 MG/1
15 TABLET, FILM COATED, EXTENDED RELEASE ORAL 2 TIMES DAILY
Qty: 30 TABLET | Refills: 0 | Status: CANCELLED | OUTPATIENT
Start: 2024-05-21

## 2024-05-21 RX ORDER — IBUPROFEN 600 MG/1
600 TABLET ORAL EVERY 8 HOURS PRN
Qty: 20 TABLET | Refills: 0 | Status: CANCELLED | OUTPATIENT
Start: 2024-05-21

## 2024-05-21 RX ORDER — MORPHINE SULFATE 15 MG/1
15 TABLET, FILM COATED, EXTENDED RELEASE ORAL 2 TIMES DAILY
Qty: 30 TABLET | Refills: 0 | Status: SHIPPED | OUTPATIENT
Start: 2024-05-21 | End: 2024-06-12 | Stop reason: SDUPTHER

## 2024-05-22 PROCEDURE — 77412 RADIATION TX DELIVERY LVL 3: CPT | Performed by: RADIOLOGY

## 2024-05-22 PROCEDURE — G6002 STEREOSCOPIC X-RAY GUIDANCE: HCPCS | Mod: 26,,, | Performed by: RADIOLOGY

## 2024-05-22 PROCEDURE — 77387 GUIDANCE FOR RADJ TX DLVR: CPT | Mod: TC | Performed by: RADIOLOGY

## 2024-05-23 PROCEDURE — 77387 GUIDANCE FOR RADJ TX DLVR: CPT | Mod: TC | Performed by: RADIOLOGY

## 2024-05-23 PROCEDURE — 77336 RADIATION PHYSICS CONSULT: CPT | Performed by: RADIOLOGY

## 2024-05-23 PROCEDURE — 77412 RADIATION TX DELIVERY LVL 3: CPT | Performed by: RADIOLOGY

## 2024-05-23 PROCEDURE — G6002 STEREOSCOPIC X-RAY GUIDANCE: HCPCS | Mod: 26,,, | Performed by: RADIOLOGY

## 2024-05-25 NOTE — ASSESSMENT & PLAN NOTE
Discussed dietary modification, encouraged him to continue exercise  Will continue Trulicity as above for potential weight benefit   Treatment time: 3 hours  Net UF: 1800 ml     Pre weight: 112.3 kg  Post weight:110.5 kg  EDW: tbd kg    Access used: cvc    Access function: good with  ml/min per MD during rounding      Medications or blood products given: na     Regular outpatient schedule: tbd     Summary of response to treatment: Patient tolerated treatment well and without any complications. Patient remained stable throughout entire treatment. Copy of dialysis treatment record placed in chart, to be scanned into EMR.

## 2024-05-30 ENCOUNTER — LAB VISIT (OUTPATIENT)
Dept: LAB | Facility: HOSPITAL | Age: 49
End: 2024-05-30
Attending: INTERNAL MEDICINE
Payer: MEDICARE

## 2024-05-30 DIAGNOSIS — C79.31 SECONDARY MALIGNANT NEOPLASM OF BRAIN: ICD-10-CM

## 2024-05-30 DIAGNOSIS — C50.021 MALIGNANT NEOPLASM INVOLVING BOTH NIPPLE AND AREOLA OF RIGHT BREAST IN MALE, ESTROGEN RECEPTOR NEGATIVE: ICD-10-CM

## 2024-05-30 DIAGNOSIS — Z17.1 MALIGNANT NEOPLASM INVOLVING BOTH NIPPLE AND AREOLA OF RIGHT BREAST IN MALE, ESTROGEN RECEPTOR NEGATIVE: ICD-10-CM

## 2024-05-30 DIAGNOSIS — C79.51 MALIGNANT NEOPLASM METASTATIC TO BONE: ICD-10-CM

## 2024-05-30 LAB
ALBUMIN SERPL BCP-MCNC: 2.9 G/DL (ref 3.5–5.2)
ALP SERPL-CCNC: 186 U/L (ref 55–135)
ALT SERPL W/O P-5'-P-CCNC: 15 U/L (ref 10–44)
ANION GAP SERPL CALC-SCNC: 10 MMOL/L (ref 8–16)
AST SERPL-CCNC: 25 U/L (ref 10–40)
BASOPHILS # BLD AUTO: 0.02 K/UL (ref 0–0.2)
BASOPHILS NFR BLD: 0.4 % (ref 0–1.9)
BILIRUB SERPL-MCNC: 0.3 MG/DL (ref 0.1–1)
BUN SERPL-MCNC: 12 MG/DL (ref 6–20)
CALCIUM SERPL-MCNC: 9.6 MG/DL (ref 8.7–10.5)
CHLORIDE SERPL-SCNC: 100 MMOL/L (ref 95–110)
CO2 SERPL-SCNC: 26 MMOL/L (ref 23–29)
CREAT SERPL-MCNC: 1 MG/DL (ref 0.5–1.4)
DIFFERENTIAL METHOD BLD: ABNORMAL
EOSINOPHIL # BLD AUTO: 0.2 K/UL (ref 0–0.5)
EOSINOPHIL NFR BLD: 3.5 % (ref 0–8)
ERYTHROCYTE [DISTWIDTH] IN BLOOD BY AUTOMATED COUNT: 18.8 % (ref 11.5–14.5)
EST. GFR  (NO RACE VARIABLE): >60 ML/MIN/1.73 M^2
GLUCOSE SERPL-MCNC: 139 MG/DL (ref 70–110)
HCT VFR BLD AUTO: 27.9 % (ref 40–54)
HGB BLD-MCNC: 8.4 G/DL (ref 14–18)
IMM GRANULOCYTES # BLD AUTO: 0.03 K/UL (ref 0–0.04)
IMM GRANULOCYTES NFR BLD AUTO: 0.6 % (ref 0–0.5)
LYMPHOCYTES # BLD AUTO: 0.9 K/UL (ref 1–4.8)
LYMPHOCYTES NFR BLD: 16.9 % (ref 18–48)
MCH RBC QN AUTO: 22 PG (ref 27–31)
MCHC RBC AUTO-ENTMCNC: 30.1 G/DL (ref 32–36)
MCV RBC AUTO: 73 FL (ref 82–98)
MONOCYTES # BLD AUTO: 0.5 K/UL (ref 0.3–1)
MONOCYTES NFR BLD: 8.9 % (ref 4–15)
NEUTROPHILS # BLD AUTO: 3.6 K/UL (ref 1.8–7.7)
NEUTROPHILS NFR BLD: 69.7 % (ref 38–73)
NRBC BLD-RTO: 0 /100 WBC
PLATELET # BLD AUTO: 330 K/UL (ref 150–450)
PMV BLD AUTO: 9.1 FL (ref 9.2–12.9)
POTASSIUM SERPL-SCNC: 3.9 MMOL/L (ref 3.5–5.1)
PROT SERPL-MCNC: 8.5 G/DL (ref 6–8.4)
RBC # BLD AUTO: 3.82 M/UL (ref 4.6–6.2)
SODIUM SERPL-SCNC: 136 MMOL/L (ref 136–145)
WBC # BLD AUTO: 5.16 K/UL (ref 3.9–12.7)

## 2024-05-30 PROCEDURE — 85025 COMPLETE CBC W/AUTO DIFF WBC: CPT | Performed by: INTERNAL MEDICINE

## 2024-05-30 PROCEDURE — 80053 COMPREHEN METABOLIC PANEL: CPT | Performed by: INTERNAL MEDICINE

## 2024-05-30 PROCEDURE — 36415 COLL VENOUS BLD VENIPUNCTURE: CPT | Performed by: INTERNAL MEDICINE

## 2024-05-30 RX ORDER — PROCHLORPERAZINE EDISYLATE 5 MG/ML
10 INJECTION INTRAMUSCULAR; INTRAVENOUS ONCE AS NEEDED
Status: CANCELLED
Start: 2024-05-30

## 2024-05-30 RX ORDER — HEPARIN 100 UNIT/ML
500 SYRINGE INTRAVENOUS
Status: CANCELLED | OUTPATIENT
Start: 2024-06-07

## 2024-05-30 RX ORDER — PROCHLORPERAZINE EDISYLATE 5 MG/ML
10 INJECTION INTRAMUSCULAR; INTRAVENOUS ONCE AS NEEDED
Status: CANCELLED
Start: 2024-06-14

## 2024-05-30 RX ORDER — PROCHLORPERAZINE EDISYLATE 5 MG/ML
10 INJECTION INTRAMUSCULAR; INTRAVENOUS ONCE AS NEEDED
Status: CANCELLED
Start: 2024-06-07

## 2024-05-30 RX ORDER — EPINEPHRINE 0.3 MG/.3ML
0.3 INJECTION SUBCUTANEOUS ONCE AS NEEDED
Status: CANCELLED | OUTPATIENT
Start: 2024-06-14

## 2024-05-30 RX ORDER — EPINEPHRINE 0.3 MG/.3ML
0.3 INJECTION SUBCUTANEOUS ONCE AS NEEDED
Status: CANCELLED | OUTPATIENT
Start: 2024-05-30

## 2024-05-30 RX ORDER — HEPARIN 100 UNIT/ML
500 SYRINGE INTRAVENOUS
Status: CANCELLED | OUTPATIENT
Start: 2024-06-14

## 2024-05-30 RX ORDER — DIPHENHYDRAMINE HYDROCHLORIDE 50 MG/ML
50 INJECTION INTRAMUSCULAR; INTRAVENOUS ONCE AS NEEDED
Status: CANCELLED | OUTPATIENT
Start: 2024-05-30

## 2024-05-30 RX ORDER — SODIUM CHLORIDE 0.9 % (FLUSH) 0.9 %
10 SYRINGE (ML) INJECTION
Status: CANCELLED | OUTPATIENT
Start: 2024-05-30

## 2024-05-30 RX ORDER — HEPARIN 100 UNIT/ML
500 SYRINGE INTRAVENOUS
Status: CANCELLED | OUTPATIENT
Start: 2024-05-30

## 2024-05-30 RX ORDER — SODIUM CHLORIDE 0.9 % (FLUSH) 0.9 %
10 SYRINGE (ML) INJECTION
Status: CANCELLED | OUTPATIENT
Start: 2024-06-07

## 2024-05-30 RX ORDER — DIPHENHYDRAMINE HYDROCHLORIDE 50 MG/ML
50 INJECTION INTRAMUSCULAR; INTRAVENOUS ONCE AS NEEDED
Status: CANCELLED | OUTPATIENT
Start: 2024-06-07

## 2024-05-30 RX ORDER — EPINEPHRINE 0.3 MG/.3ML
0.3 INJECTION SUBCUTANEOUS ONCE AS NEEDED
Status: CANCELLED | OUTPATIENT
Start: 2024-06-07

## 2024-05-30 RX ORDER — SODIUM CHLORIDE 0.9 % (FLUSH) 0.9 %
10 SYRINGE (ML) INJECTION
Status: CANCELLED | OUTPATIENT
Start: 2024-06-14

## 2024-05-30 RX ORDER — DIPHENHYDRAMINE HYDROCHLORIDE 50 MG/ML
50 INJECTION INTRAMUSCULAR; INTRAVENOUS ONCE AS NEEDED
Status: CANCELLED | OUTPATIENT
Start: 2024-06-14

## 2024-05-31 ENCOUNTER — INFUSION (OUTPATIENT)
Dept: INFUSION THERAPY | Facility: HOSPITAL | Age: 49
End: 2024-05-31
Payer: MEDICARE

## 2024-05-31 VITALS
DIASTOLIC BLOOD PRESSURE: 77 MMHG | BODY MASS INDEX: 38.74 KG/M2 | SYSTOLIC BLOOD PRESSURE: 143 MMHG | WEIGHT: 292.31 LBS | TEMPERATURE: 98 F | HEIGHT: 73 IN | RESPIRATION RATE: 18 BRPM | HEART RATE: 107 BPM

## 2024-05-31 DIAGNOSIS — Z17.1 MALIGNANT NEOPLASM INVOLVING BOTH NIPPLE AND AREOLA OF RIGHT BREAST IN MALE, ESTROGEN RECEPTOR NEGATIVE: ICD-10-CM

## 2024-05-31 DIAGNOSIS — C79.51 MALIGNANT NEOPLASM METASTATIC TO BONE: Primary | ICD-10-CM

## 2024-05-31 DIAGNOSIS — C50.021 MALIGNANT NEOPLASM INVOLVING BOTH NIPPLE AND AREOLA OF RIGHT BREAST IN MALE, ESTROGEN RECEPTOR NEGATIVE: ICD-10-CM

## 2024-05-31 PROCEDURE — A4216 STERILE WATER/SALINE, 10 ML: HCPCS | Performed by: INTERNAL MEDICINE

## 2024-05-31 PROCEDURE — 25000003 PHARM REV CODE 250: Performed by: INTERNAL MEDICINE

## 2024-05-31 PROCEDURE — 96409 CHEMO IV PUSH SNGL DRUG: CPT

## 2024-05-31 PROCEDURE — 63600175 PHARM REV CODE 636 W HCPCS: Performed by: INTERNAL MEDICINE

## 2024-05-31 PROCEDURE — 96367 TX/PROPH/DG ADDL SEQ IV INF: CPT

## 2024-05-31 RX ORDER — HEPARIN 100 UNIT/ML
500 SYRINGE INTRAVENOUS
Status: DISCONTINUED | OUTPATIENT
Start: 2024-05-31 | End: 2024-05-31 | Stop reason: HOSPADM

## 2024-05-31 RX ORDER — SODIUM CHLORIDE 0.9 % (FLUSH) 0.9 %
10 SYRINGE (ML) INJECTION
Status: DISCONTINUED | OUTPATIENT
Start: 2024-05-31 | End: 2024-05-31 | Stop reason: HOSPADM

## 2024-05-31 RX ORDER — DIPHENHYDRAMINE HYDROCHLORIDE 50 MG/ML
50 INJECTION INTRAMUSCULAR; INTRAVENOUS ONCE AS NEEDED
Status: DISCONTINUED | OUTPATIENT
Start: 2024-05-31 | End: 2024-05-31 | Stop reason: HOSPADM

## 2024-05-31 RX ORDER — EPINEPHRINE 0.3 MG/.3ML
0.3 INJECTION SUBCUTANEOUS ONCE AS NEEDED
Status: DISCONTINUED | OUTPATIENT
Start: 2024-05-31 | End: 2024-05-31 | Stop reason: HOSPADM

## 2024-05-31 RX ORDER — PROCHLORPERAZINE EDISYLATE 5 MG/ML
10 INJECTION INTRAMUSCULAR; INTRAVENOUS ONCE AS NEEDED
Status: DISCONTINUED | OUTPATIENT
Start: 2024-05-31 | End: 2024-05-31 | Stop reason: HOSPADM

## 2024-05-31 RX ADMIN — Medication 10 ML: at 02:05

## 2024-05-31 RX ADMIN — HEPARIN 500 UNITS: 100 SYRINGE at 02:05

## 2024-05-31 RX ADMIN — DEXAMETHASONE SODIUM PHOSPHATE 10 MG: 4 INJECTION, SOLUTION INTRA-ARTICULAR; INTRALESIONAL; INTRAMUSCULAR; INTRAVENOUS; SOFT TISSUE at 01:05

## 2024-05-31 RX ADMIN — GEMCITABINE 2600 MG: 38 INJECTION, SOLUTION INTRAVENOUS at 02:05

## 2024-05-31 RX ADMIN — SODIUM CHLORIDE: 9 INJECTION, SOLUTION INTRAVENOUS at 01:05

## 2024-06-03 ENCOUNTER — HOSPITAL ENCOUNTER (OUTPATIENT)
Dept: RADIOLOGY | Facility: HOSPITAL | Age: 49
Discharge: HOME | End: 2024-06-03
Attending: NEUROLOGICAL SURGERY
Payer: MEDICARE

## 2024-06-03 DIAGNOSIS — H54.7 UNSPECIFIED VISUAL LOSS: ICD-10-CM

## 2024-06-03 DIAGNOSIS — G93.9 BRAIN LESION: ICD-10-CM

## 2024-06-03 PROCEDURE — A9585 GADOBUTROL INJECTION: HCPCS | Performed by: NEUROLOGICAL SURGERY

## 2024-06-03 PROCEDURE — 70543 MRI ORBT/FAC/NCK W/O &W/DYE: CPT | Mod: TC

## 2024-06-03 PROCEDURE — 70543 MRI ORBT/FAC/NCK W/O &W/DYE: CPT | Mod: 26,,, | Performed by: RADIOLOGY

## 2024-06-03 PROCEDURE — 25500020 PHARM REV CODE 255: Performed by: NEUROLOGICAL SURGERY

## 2024-06-03 RX ORDER — GADOBUTROL 604.72 MG/ML
10 INJECTION INTRAVENOUS
Status: COMPLETED | OUTPATIENT
Start: 2024-06-03 | End: 2024-06-03

## 2024-06-03 RX ADMIN — GADOBUTROL 10 ML: 604.72 INJECTION INTRAVENOUS at 11:06

## 2024-06-04 ENCOUNTER — OFFICE VISIT (OUTPATIENT)
Dept: RADIATION ONCOLOGY | Facility: CLINIC | Age: 49
End: 2024-06-04
Payer: MEDICARE

## 2024-06-04 ENCOUNTER — OFFICE VISIT (OUTPATIENT)
Dept: PALLIATIVE MEDICINE | Facility: CLINIC | Age: 49
End: 2024-06-04
Payer: MEDICARE

## 2024-06-04 ENCOUNTER — TELEPHONE (OUTPATIENT)
Dept: HEMATOLOGY/ONCOLOGY | Facility: CLINIC | Age: 49
End: 2024-06-04
Payer: MEDICARE

## 2024-06-04 ENCOUNTER — PATIENT MESSAGE (OUTPATIENT)
Dept: HEMATOLOGY/ONCOLOGY | Facility: CLINIC | Age: 49
End: 2024-06-04
Payer: MEDICARE

## 2024-06-04 VITALS
HEIGHT: 73 IN | WEIGHT: 280.19 LBS | HEART RATE: 108 BPM | TEMPERATURE: 97 F | SYSTOLIC BLOOD PRESSURE: 138 MMHG | TEMPERATURE: 97 F | DIASTOLIC BLOOD PRESSURE: 76 MMHG | DIASTOLIC BLOOD PRESSURE: 76 MMHG | RESPIRATION RATE: 18 BRPM | HEART RATE: 108 BPM | WEIGHT: 280.19 LBS | OXYGEN SATURATION: 100 % | OXYGEN SATURATION: 100 % | HEIGHT: 73 IN | BODY MASS INDEX: 37.13 KG/M2 | SYSTOLIC BLOOD PRESSURE: 138 MMHG | RESPIRATION RATE: 18 BRPM | BODY MASS INDEX: 37.13 KG/M2

## 2024-06-04 DIAGNOSIS — C50.021 MALIGNANT NEOPLASM INVOLVING BOTH NIPPLE AND AREOLA OF RIGHT BREAST IN MALE, ESTROGEN RECEPTOR NEGATIVE: Primary | ICD-10-CM

## 2024-06-04 DIAGNOSIS — Z71.89 ADVANCED CARE PLANNING/COUNSELING DISCUSSION: ICD-10-CM

## 2024-06-04 DIAGNOSIS — Z51.5 ENCOUNTER FOR PALLIATIVE CARE: ICD-10-CM

## 2024-06-04 DIAGNOSIS — R63.0 ANOREXIA: ICD-10-CM

## 2024-06-04 DIAGNOSIS — R53.0 NEOPLASTIC (MALIGNANT) RELATED FATIGUE: ICD-10-CM

## 2024-06-04 DIAGNOSIS — R11.0 NAUSEA: ICD-10-CM

## 2024-06-04 DIAGNOSIS — G62.0 CHEMOTHERAPY-INDUCED NEUROPATHY: ICD-10-CM

## 2024-06-04 DIAGNOSIS — F32.0 CURRENT MILD EPISODE OF MAJOR DEPRESSIVE DISORDER WITHOUT PRIOR EPISODE: ICD-10-CM

## 2024-06-04 DIAGNOSIS — Z17.1 MALIGNANT NEOPLASM INVOLVING BOTH NIPPLE AND AREOLA OF RIGHT BREAST IN MALE, ESTROGEN RECEPTOR NEGATIVE: Primary | ICD-10-CM

## 2024-06-04 DIAGNOSIS — G89.3 NEOPLASM RELATED PAIN: ICD-10-CM

## 2024-06-04 DIAGNOSIS — F43.23 ADJUSTMENT DISORDER WITH MIXED ANXIETY AND DEPRESSED MOOD: ICD-10-CM

## 2024-06-04 DIAGNOSIS — T45.1X5A CHEMOTHERAPY-INDUCED NEUROPATHY: ICD-10-CM

## 2024-06-04 DIAGNOSIS — C79.31 SECONDARY MALIGNANT NEOPLASM OF BRAIN: Primary | ICD-10-CM

## 2024-06-04 DIAGNOSIS — R19.7 DIARRHEA, UNSPECIFIED TYPE: ICD-10-CM

## 2024-06-04 DIAGNOSIS — K59.00 CONSTIPATION, UNSPECIFIED CONSTIPATION TYPE: ICD-10-CM

## 2024-06-04 DIAGNOSIS — G47.00 INSOMNIA, UNSPECIFIED TYPE: ICD-10-CM

## 2024-06-04 PROCEDURE — 1160F RVW MEDS BY RX/DR IN RCRD: CPT | Mod: CPTII,S$GLB,, | Performed by: STUDENT IN AN ORGANIZED HEALTH CARE EDUCATION/TRAINING PROGRAM

## 2024-06-04 PROCEDURE — 99999 PR PBB SHADOW E&M-EST. PATIENT-LVL V: CPT | Mod: PBBFAC,,, | Performed by: RADIOLOGY

## 2024-06-04 PROCEDURE — 1159F MED LIST DOCD IN RCRD: CPT | Mod: CPTII,S$GLB,, | Performed by: STUDENT IN AN ORGANIZED HEALTH CARE EDUCATION/TRAINING PROGRAM

## 2024-06-04 PROCEDURE — 3075F SYST BP GE 130 - 139MM HG: CPT | Mod: CPTII,S$GLB,, | Performed by: STUDENT IN AN ORGANIZED HEALTH CARE EDUCATION/TRAINING PROGRAM

## 2024-06-04 PROCEDURE — 3078F DIAST BP <80 MM HG: CPT | Mod: CPTII,S$GLB,, | Performed by: RADIOLOGY

## 2024-06-04 PROCEDURE — 99999 PR PBB SHADOW E&M-EST. PATIENT-LVL III: CPT | Mod: PBBFAC,,, | Performed by: STUDENT IN AN ORGANIZED HEALTH CARE EDUCATION/TRAINING PROGRAM

## 2024-06-04 PROCEDURE — 3078F DIAST BP <80 MM HG: CPT | Mod: CPTII,S$GLB,, | Performed by: STUDENT IN AN ORGANIZED HEALTH CARE EDUCATION/TRAINING PROGRAM

## 2024-06-04 PROCEDURE — 4010F ACE/ARB THERAPY RXD/TAKEN: CPT | Mod: CPTII,S$GLB,, | Performed by: RADIOLOGY

## 2024-06-04 PROCEDURE — 1159F MED LIST DOCD IN RCRD: CPT | Mod: CPTII,S$GLB,, | Performed by: RADIOLOGY

## 2024-06-04 PROCEDURE — 3075F SYST BP GE 130 - 139MM HG: CPT | Mod: CPTII,S$GLB,, | Performed by: RADIOLOGY

## 2024-06-04 PROCEDURE — 99215 OFFICE O/P EST HI 40 MIN: CPT | Mod: S$GLB,,, | Performed by: STUDENT IN AN ORGANIZED HEALTH CARE EDUCATION/TRAINING PROGRAM

## 2024-06-04 PROCEDURE — 4010F ACE/ARB THERAPY RXD/TAKEN: CPT | Mod: CPTII,S$GLB,, | Performed by: STUDENT IN AN ORGANIZED HEALTH CARE EDUCATION/TRAINING PROGRAM

## 2024-06-04 PROCEDURE — 99024 POSTOP FOLLOW-UP VISIT: CPT | Mod: S$GLB,,, | Performed by: RADIOLOGY

## 2024-06-04 PROCEDURE — 3008F BODY MASS INDEX DOCD: CPT | Mod: CPTII,S$GLB,, | Performed by: STUDENT IN AN ORGANIZED HEALTH CARE EDUCATION/TRAINING PROGRAM

## 2024-06-04 RX ORDER — NORTRIPTYLINE HYDROCHLORIDE 50 MG/1
50 CAPSULE ORAL NIGHTLY
Qty: 30 CAPSULE | Refills: 2 | Status: SHIPPED | OUTPATIENT
Start: 2024-06-04

## 2024-06-04 RX ORDER — FLUOXETINE HYDROCHLORIDE 40 MG/1
80 CAPSULE ORAL DAILY
Qty: 60 CAPSULE | Refills: 0 | Status: SHIPPED | OUTPATIENT
Start: 2024-06-04 | End: 2024-08-03

## 2024-06-04 NOTE — PROGRESS NOTES
[facility] Palliative [facility]  Palliative Care   Social Work Visit      Patient Name: Paul Li Jr.  MRN: [id number]  Palliative Care Provider:  Angeles Rodriguez MD   Primary Care Physician: Kareem Arroyo MD  Principal Problem:  Malignant neoplasm involving both nipple and areola of right breast in male    SW accompanied MD during follow up with patient. Patient presents AAOx4 with stable mood.  Patient reports he is going well from an emotional/mental standpoint with the exception of insomnia.  MD and SW reviewed mindfulness techniques with patient. Patient adds his brother has been helpful in providing support and words of encouragement during regular phone conversations.  SW encouraged patient remind himself of these positive affirmations provided by brother when experiencing moments of distress fermin supplement his mindfulness practice.      Patient endorses an improvement in back pain following initiation of radiation therapy.  Patient adds this helped him to enjoy this past weekend with family celebrating the first birthday of his grandchild.      Patient denies psychosocial concerns. SW remains available to provide support; will continue to follow.    Sade Duncan, CHARLYW-BACS    Palliative Medicine

## 2024-06-04 NOTE — Clinical Note
Good afternoon,   I saw Mr. Miller on Tuesday. Would you be okay with him getting a massage with the [facility] in [place]? He notes at times that his pain is increased due to muscle tension/spasms. I did increase his pamelor to 50 mg to help more with sleep at night.  Thanks, DN

## 2024-06-04 NOTE — PROGRESS NOTES
[facility]  [facility]  Progress Note    Reason for Consult: symptom management and ACP   Referring MD: Dr. 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 Linn  - most recent imaging showed progression of disease  - patient moved to [place] and received treatment in [facility] during that time. He is now back in town and re-established care with Dr. Linn (med onc), Dr. Sandoval (onc-psych), and this clinic  - soft tissue lesion noted on left orbital apex/optic canal s/p radiation therapy  - patient seen in ED on 04/15/2024 to rule out cord compression then elected to go home from ED once cord compression ruled out  - plan to start disease directed therapy with weekly Gemcitabine and Zometa    Encounter for palliative care/Advance Care Planning   - patient decisional and by himself in clinic today  - no ACP documents uploaded into EMR  - goals: life prolonging  - philosophy of Palliative Medicine and new patient folder given to and briefly reviewed with patient at first visit  - ACP booklet given to and reviewed with patient at first visit by Sarabjit Casas RN  - code status not specifically discussed at this visit  - will follow up at future appointments for any questions/concerns that arise after patient reviews new patient information   - per chart review, patient's oncology team did start talk about overall poor prognosis of his cancer prior to his move to [place]  - patient initially told that there were no further treatment options but then he was relieved to hear that there is an option available to him.  - patient states he wants to continue all disease directed therapy options available to him  - spoke about the difficulty of hearing no further treatment. Started  discussion at previous visit about if this treatment regimen does not work or if there are no treatment options after this regimen, then patient can continue to follow with this clinic while able or patient can enroll in hospice care. Did not go into detail about hospice care today.      Adjustment disorder with mixed anxiety and depressed mood  - patient doing well at this time. He notes improvement in his back pain with radiation treatment. The improvement in pain has helped him be able to enjoy more activities with his family (eg. 1st birthday party for grandchild)  - patient states he still has moderate anxiety at times. He finds speaking with his brother very helpful.  - he reports living with his parents at this time; he is requesting assistance to see if there are resources available for housing.   - he notes that he feels anxious at times and has to do things in certain order as well as quick to anger over things that never previously bothered him.   - currently on fluoxitine 80 mg daily and xanax 0.5 mg PRN TID  - patient re-established care with oncology psychology, Dr. Sandoval. Patient's spouse now also with therapist  - patient did not tolerate Wellbutrin; he notes feeling like a zombie with this medication; STOP wellbutrin today  - emotional support provided along with [employer]; please see SW note for full details  - will continue to monitor closely     Insomnia  - worsening  - he reports that the last two weeks have been worst yet with cold sweats, not being able to get comfortable, and increased anxiety at times  - usually gets ~ 4 hours of sleep  - stop trazodone   - Increase nortriptyline to 50 mg qhs for neuropathy and sleep  - Pall [employer] re-discussed body scan techniques and mediation  - tip sheet in new patient folder for patient to review  - will continue close monitoring    Cancer related pain/Neuropathic pain  - patient with moderate pain in his lower back that he describes as nerve  pain  - he notes that if he does certain maneuvers (eg. Bending a certain way) the pain occurs and takes a while to resolve  - he is currently using ibuprofen BID for pain relief  - he continue to experience neuropathy in his bilateral lower extremities; compliant with gabapentin 900 mg TID  - patient reports that he uses his Norco as needed  - no need for refill of norco at this time  - patient was started on MS Contin while receiving treatment in [place]; patient notes that he does not find it helpful for the pain he has. He states that he does not have any other pain at this time except the back/nerve pain  - DECREASE MS Contin to 15 mg BID for two weeks then stop; patient continues to take BID and notes that his overall pain is better  - he notes feeling like his whole body will tense up at times; will reach out to oncology about possibility of oncologic massage therapy  - opioid safety sheet in new patient folder for patient to review  - will continue to monitor    Nausea/Anorexia  - no issues with nausea at this time  - patient reports appetite fluctuates; there are days that he does not want to eat anything and other days where he will want to eat everything  - discussed using smaller portions more frequently and protein shakes/smoothies as needed  - discussed staying hydrated as well  - patient uses zofran prn to help with nausea    Constipation/Diarrhea  - no issues at this time; previously would fluctuate between constipation and diarrhea.   - previously discussed using bowel regimen to prevent build up of stool  - discussed need for adequate hydration  - constipation tip sheet in new patient folder for patient to review    Understanding of illness/Prognosis: patient has good understanding of his illness; prognosis is poor    Goals of care: life prolonging    Follow up: ~ 6 weeks    Patient's encounter and above plan of care discussed with patient's oncology-psychologist    SUBJECTIVE:     History of  "Present Illness:  Patient is a 48 y.o. year old male with arthritis, DM, HTN, and right sided breast cancer presents to Palliative Medicine for symptom management and ACP. Please see oncology notes for full details of oncologic history and treatment course.     06/04/2024:  LA  reviewed and summarized:  05/21/2024 MS contin 15 mg Disp: 30 for 15 days  05/21/2024 Hydrocodone-apap  mg disp: 90 for 30 days  05/09/2024 Gabapentin 300 mg Disp: 252 for 28 days  05/01/2024 MS Contin 15 mg Disp: 30 for 15 days    Patient presents today by himself. He notes overall doing well. He tolerate treatment on Friday without major side effects. He finds that since radiation completed to his back, his pain has improved. He continues to use MS Contin bid and hydrocodone-apap prn. He finds himself being able to enjoy activities with his family. Patient noting continued anxiety at times. He was unable to tolerate Wellbutrin due to feeling "like a zombie". He has been talking more with his brother, which he has found helpful. He noted some fatigue with chemotherapy. Patient also reporting worsening sleep over the last two weeks. He has hard time going to sleep as well as staying asleep. He denies any issues with constipation or diarrhea.     Please see previous notes for full details of encounters on 10/11/2021; 12/13/2021; 02/11/2022; 09/02/2022; 09/30/2022; 10/11/2022; 12/15/2022; 02/09/2023; 02/27/2023; 05/11/2023; 06/14/2023; 05/01/2024;    Past Medical History:   Diagnosis Date    Breast cancer     Cancer     R breast    Diabetes mellitus     HTN (hypertension)     Leg edema, right     Prediabetes     Seizures     at age 18 - stress related    Therapy     marital counseling     Past Surgical History:   Procedure Laterality Date    AXILLARY NODE DISSECTION Right 11/22/2019    Procedure: LYMPHADENECTOMY, AXILLARY RIGHT;  Surgeon: Shima Whyte MD;  Location: [facility];  Service: General;  Laterality: Right;    AXILLARY NODE " DISSECTION Right 12/17/2019    Procedure: LYMPHADENECTOMY, AXILLARY;  Surgeon: Shima Whyte MD;  Location: [facility] OR [address] [place] [address];  Service: General;  Laterality: Right;    BREAST BIOPSY      EXCISION OF HYDROCELE Right 10/28/2022    Procedure: HYDROCELECTOMY;  Surgeon: Anthony Cordero Jr., MD;  Location: [facility] OR [address] [place] [address];  Service: Urology;  Laterality: Right;  1 hour    INSERTION OF TUNNELED CENTRAL VENOUS CATHETER (CVC) WITH SUBCUTANEOUS PORT Left 5/24/2019    Procedure: [id number];  Surgeon: Shima Whyte MD;  Location: [facility] OR [address] [place] [address];  Service: General;  Laterality: Left;    INSERTION OF TUNNELED CENTRAL VENOUS CATHETER (CVC) WITH SUBCUTANEOUS PORT Left 9/17/2021    Procedure: INSERTION, SINGLE LUMEN CATHETER WITH PORT, WITH FLUOROSCOPIC GUIDANCE, right;  Surgeon: Gloria Holliday MD;  Location: [facility] OR [address] [place] [address];  Service: General;  Laterality: Left;  rapid covid test    SENTINEL LYMPH NODE BIOPSY Right 11/22/2019    Procedure: BIOPSY, LYMPH NODE, SENTINEL RIGHT;  Surgeon: Shima Whyte MD;  Location: [facility];  Service: General;  Laterality: Right;    SIMPLE MASTECTOMY Right 11/22/2019    Procedure: MASTECTOMY, SIMPLE RIGHT (CONSENT AM OF) 2.5 hr case;  Surgeon: Shima Whyte MD;  Location: [facility];  Service: General;  Laterality: Right;     Family History   Problem Relation Name Age of Onset    Hypertension Mother      Diabetes Mother      Hypertension Father      Lupus Father      Post-traumatic stress disorder Brother      No Known Problems Maternal Grandmother      Colon cancer Maternal Grandfather      Breast cancer Paternal Grandmother      No Known Problems Paternal Grandfather      No Known Problems Sister      No Known Problems Maternal Aunt      No Known Problems Maternal Uncle      Fibromyalgia Paternal Aunt      Lupus Paternal Aunt      No Known Problems Paternal Uncle      No Known Problems Brother      No Known Problems Sister      No Known Problems Son      No Known Problems Son      No  Known Problems Son      No Known Problems Son       Review of patient's allergies indicates:  No Known Allergies    Medications:    Current Outpatient Medications:     ALPRAZolam (XANAX) 0.5 MG tablet, Take 1 tablet (0.5 mg total) by mouth 3 (three) times daily as needed for Insomnia or Anxiety., Disp: 60 tablet, Rfl: 0    amLODIPine (NORVASC) 10 MG tablet, TAKE 1 TABLET (10 MG) BY MOUTH EVERY DAY, Disp: 90 tablet, Rfl: 3    bisacodyL (DULCOLAX) 5 mg EC tablet, Take 1 tablet (5 mg total) by mouth daily as needed for Constipation., Disp: 30 tablet, Rfl: 1    buPROPion (WELLBUTRIN XL) 150 MG TB24 tablet, Take 1 tablet (150 mg total) by mouth once daily., Disp: 30 tablet, Rfl: 2    calcium-vitamin D3 (OYSTER SHELL CALCIUM-VIT D3) 500 mg-5 mcg (200 unit) per tablet, Take 1 tablet by mouth 2 (two) times daily., Disp: 180 tablet, Rfl: 3    diphenoxylate-atropine 2.5-0.025 mg (LOMOTIL) 2.5-0.025 mg per tablet, Take 1 tablet by mouth 4 (four) times daily as needed for Diarrhea., Disp: 60 tablet, Rfl: 2    dulaglutide (TRULICITY) 1.5 mg/0.5 mL pen injector, Inject 1.5 mg into the skin once a week., Disp: 4 pen , Rfl: 5    ferrous sulfate (IRON) 325 mg (65 mg iron) Tab tablet, Take 1 tablet (325 mg total) by mouth once daily., Disp: 90 tablet, Rfl: 1    FLUoxetine 40 MG capsule, Take 2 capsules (80 mg total) by mouth once daily., Disp: 60 capsule, Rfl: 0    gabapentin (NEURONTIN) 300 MG capsule, Take 3 capsules (900 mg total) by mouth 3 (three) times daily., Disp: 270 capsule, Rfl: 2    hydroCHLOROthiazide (HYDRODIURIL) 25 MG tablet, TAKE 1 TABLET BY MOUTH ONCE DAILY, Disp: 90 tablet, Rfl: 3    HYDROcodone-acetaminophen (NORCO)  mg per tablet, Take 1 tablet by mouth every 8 (eight) hours as needed for Pain., Disp: 90 tablet, Rfl: 0    ibuprofen (ADVIL,MOTRIN) 600 MG tablet, Take 1 tablet (600 mg total) by mouth every 8 (eight) hours as needed for Pain., Disp: 20 tablet, Rfl: 0    insulin detemir U-100, Levemir, (LEVEMIR  FLEXPEN) 100 unit/mL (3 mL) InPn pen, Inject 11 Units into the skin every evening. Patient had low blood sugar while in-patient, decreasing dose, Disp: 6 mL, Rfl: 1    insulin lispro (HUMALOG KWIKPEN INSULIN) 100 unit/mL pen, Inject 5 Units into the skin 3 (three) times daily. (Patient not taking: Reported on 5/9/2024), Disp: 6 mL, Rfl: 2    lancets 33 gauge Misc, 1 lancet by Misc.(Non-Drug; Combo Route) route once daily., Disp: 100 each, Rfl: 3    lancing device Misc, 1 Device by Misc.(Non-Drug; Combo Route) route 2 (two) times daily with meals., Disp: 1 each, Rfl: 0    LIDOcaine (LIDODERM) 5 %, Place 1 patch onto the skin once daily. Remove & Discard patch within 12 hours or as directed by MD, Disp: 7 patch, Rfl: 0    LIDOcaine (LIDODERM) 5 %, Place 1 patch onto the skin once daily. Remove & Discard patch within 12 hours or as directed by MD, Disp: 7 patch, Rfl: 0    loratadine (CLARITIN) 10 mg tablet, Take 1 tablet (10 mg total) by mouth once daily., Disp: 30 tablet, Rfl: 3    losartan (COZAAR) 50 MG tablet, Take 2 tablets by mouth once daily, Disp: 180 tablet, Rfl: 3    metFORMIN (GLUCOPHAGE) 500 MG tablet, Take 2 tablets (1,000 mg total) by mouth 2 (two) times daily with meals. Needs appointment for future refills, Disp: 120 tablet, Rfl: 2    morphine (MS CONTIN) 15 MG 12 hr tablet, Take 1 tablet (15 mg total) by mouth 2 (two) times daily., Disp: 30 tablet, Rfl: 0    nortriptyline (PAMELOR) 25 MG capsule, Take 1 capsule (25 mg total) by mouth every evening., Disp: 30 capsule, Rfl: 2    OLANZapine (ZYPREXA) 5 MG tablet, Take 1 tablet (5 mg total) by mouth every evening. days 1-4 of each chemotherapy cycle., Disp: 30 tablet, Rfl: 0    ondansetron (ZOFRAN-ODT) 8 MG TbDL, Dissolve 1 tablet (8 mg total) by mouth or dissolve on tongue every 8 (eight) hours as needed (nausea/vomiting).  Let saliva dissolve tablet. (Patient not taking: Reported on 5/9/2024), Disp: 60 tablet, Rfl: 5    ondansetron (ZOFRAN-ODT) 8 MG  "TbDL, Take 1 tablet (8 mg total) by mouth every 8 (eight) hours as needed (nausea)., Disp: 30 tablet, Rfl: 2    pantoprazole (PROTONIX) 40 MG tablet, Take 1 tablet (40 mg total) by mouth once daily. While taking dexamethasone, Disp: 30 tablet, Rfl: 1    pen needle, diabetic (BD ULTRA-FINE TANESHA PEN NEEDLE) 32 gauge x 5/32" Ndle, Use as directed with novolog and levemir (4 times daily), Disp: 100 each, Rfl: 5    ribociclib (KISQALI) 600 mg/day (200 mg x 3) Tab, Take 600 mg by mouth once daily., Disp: 63 tablet, Rfl: 11    tadalafiL (CIALIS) 5 MG tablet, Take 2 tablets (10 mg total) by mouth daily as needed for Erectile Dysfunction. (Patient not taking: Reported on 3/26/2024), Disp: 20 tablet, Rfl: 1  No current facility-administered medications for this visit.    OBJECTIVE:       ROS:  Review of Systems   Constitutional:  Positive for activity change, appetite change (improved) and fatigue.   HENT: Negative.     Eyes: Negative.    Respiratory: Negative.     Cardiovascular: Negative.    Gastrointestinal: Negative.  Negative for abdominal pain, constipation, diarrhea, nausea and vomiting.   Genitourinary: Negative.    Musculoskeletal:  Positive for back pain, gait problem and myalgias.   Skin: Negative.    Neurological:         + neuropathic pain in bilateral lower extremities   Psychiatric/Behavioral:  Positive for sleep disturbance. Negative for dysphoric mood, self-injury and suicidal ideas. The patient is nervous/anxious.    All other systems reviewed and are negative.      Review of Symptoms      Symptom Assessment (ESAS 0-10 Scale)  Pain:  4  Dyspnea:  0  Anxiety:  5  Nausea:  0  Depression:  0  Anorexia:  0  Fatigue:  3  Insomnia:  8  Restlessness:  8  Agitation:  0     CAM / Delirium:  Negative  Constipation:  Negative  Diarrhea:  Negative    Anxiety:  Is nervous/anxious  Constipation:  No constipation    Bowel Management Plan (BMP):  Yes      Pain Assessment:  OME in 24 hours:  30-40  Location(s): leg and " back    Back       Location: lower        Quality: Shooting, stabbing and throbbing        Quantity: 4/10 in intensity        Chronicity: Onset 2 month(s) ago, unchanged        Aggravating Factors: Activity        Alleviating Factors: NSAIDs       Associated Symptoms: None  Leg       Location: bilateral        Quality: Tingling        Quantity: 0/10 in intensity        Chronicity: Onset 6 (greater than) month(s) ago, stable        Aggravating Factors: None        Alleviating Factors: Opiates        Associated Symptoms: None    Modified Mikal Scale:  0    ECOG Performance Status ndGndrndanddndend:nd nd2nd Living Arrangements:  Lives with spouse    Psychosocial/Cultural:   See Palliative Psychosocial Note: No  Patient lives with his wife. He has three sons (one set of twins)    One son lives at home with his 3 yo grandson.  Brings additional iliana to his life  **Primary  to Follow**  Palliative Care  Consult: No    Spiritual:  F - Mariella and Belief:  Yes  I - Importance:  High  A - Address in Care:  Yes      Advance Care Planning   Advance Directives:   Living Will: No    La[place]: No    Do Not Resuscitate Status: No    Medical Power of : No    Agent's Name:  Efraín Li   Agent's Contact Number:  [phone]    Decision Making:  Patient answered questions  Goals of Care: The patient endorses that what is most important right now is to focus on symptom/pain control and extending life as long as possible.    Accordingly, we have decided that the best plan to meet the patient's goals includes continuing with treatment            Physical Exam:   Vitals:     Vitals:    06/04/24 1322   BP: 138/76   Pulse: 108   Resp: 18   Temp: 96.8 °F (36 °C)     Physical Exam  Vitals reviewed.   Constitutional:       General: He is not in acute distress.     Appearance: He is not ill-appearing.   HENT:      Head: Normocephalic and atraumatic.      Right Ear: External ear normal.      Left Ear: External ear normal.       Nose: Nose normal.      Mouth/Throat:      Mouth: Mucous membranes are moist.   Eyes:      General: No scleral icterus.        Right eye: No discharge.         Left eye: No discharge.   Neck:      Comments: Trachea midline  Pulmonary:      Effort: Pulmonary effort is normal. No respiratory distress.   Abdominal:      General: Abdomen is flat. There is no distension.   Musculoskeletal:         General: Swelling (minimal right arm lymphedema) present. No deformity or signs of injury.      Cervical back: Normal range of motion.   Skin:     Coloration: Skin is not pale.      Findings: No lesion or rash.   Neurological:      General: No focal deficit present.      Mental Status: He is alert and oriented to person, place, and time.      Cranial Nerves: No cranial nerve deficit.   Psychiatric:         Attention and Perception: Attention normal.         Mood and Affect: Affect normal.         Speech: Speech normal.         Behavior: Behavior normal.         Thought Content: Thought content normal.         Cognition and Memory: Cognition normal.         Judgment: Judgment normal.         Labs:  CBC:   WBC   Date Value Ref Range Status   05/30/2024 5.16 3.90 - 12.70 K/uL Final     Hemoglobin   Date Value Ref Range Status   05/30/2024 8.4 (L) 14.0 - 18.0 g/dL Final     POC Hematocrit   Date Value Ref Range Status   11/07/2022 35 (L) 36 - 54 %PCV Final     Hematocrit   Date Value Ref Range Status   05/30/2024 27.9 (L) 40.0 - 54.0 % Final     MCV   Date Value Ref Range Status   05/30/2024 73 (L) 82 - 98 fL Final     Platelets   Date Value Ref Range Status   05/30/2024 330 150 - 450 K/uL Final       LFT:   Lab Results   Component Value Date    AST 25 05/30/2024    ALKPHOS 186 (H) 05/30/2024    BILITOT 0.3 05/30/2024       Albumin:   Albumin   Date Value Ref Range Status   05/30/2024 2.9 (L) 3.5 - 5.2 g/dL Final     Protein:   Total Protein   Date Value Ref Range Status   05/30/2024 8.5 (H) 6.0 - 8.4 g/dL Final       Radiology:I  "have reviewed all pertinent imaging results/findings within the past 24 hours.    05/09/2024 NM PET CT: "Findings overall concerning for disease progression in patient with known metastatic breast cancer status post right mastectomy and right axillary lymph node dissection. Increased number of hypermetabolic mediastinal and hilar lymph nodes.  New hypermetabolic left pelvic lymph nodes. New hypermetabolic hepatic mass concerning for metastasis. Mixed osseous treatment response noting numerous new lesions and decreased uptake within some index lesions. Hypermetabolic focus in the left orbital apex concerning for metastasis. Additional findings as above."    I spent a total of 40 minutes on the day of the visit.This includes face to face time in discussion of goals of care, symptom assessment, coordination of care and emotional support.  This also includes non-face to face time preparing to see the patient (eg, review of tests/imaging), obtaining and/or reviewing separately obtained history, documenting clinical information in the electronic or other health record, independently interpreting results and communicating results to the patient/family/caregiver, or care coordinator.     Signature: Angeles Rodriguez MD            "

## 2024-06-04 NOTE — PROGRESS NOTES
6/4/2024    Radiation Oncology Follow-Up Visit      Assessment   This is a 48 y.o. male with history of Stage I Right breast triple negative breast cancer diagnosed in 5/2019 s/p NAC -> Right mastectomy 11/22/19 and completion ALND 12/17/19. He received PMRT 50.4 Gy in 28 fx followed by 10 Gy in 5 fx boost completed 3/23/20 by Dr. Nair. He received additional adjuvant chemotherapy through 9/2020. He was diagnosed with metastatic disease to bone on iliac bone biopsy 7/2/21. He has been receiving systemic therapy since that time, most recently at [facility] due to relocating closer to family. [place] [facility] [place] after progressive Left-sided decreased vision x6 weeks and sudden Left visual loss earlier that morning. MRI Orbits 3/21/24 demonstrated soft tissue mass in the posterior Left orbit with compression of the optic nerve and either involvement or compression of inferior and medial rectus muscles. He received palliative radiation to Left orbit 30 Gy in 10 fx completed 4/9/24.    Since end of radiation, he was referred to my partner Dr. Lindquist for treatment of T10-L2 and Sacral bone metastases 20 Gy in 5 fx completed 5/23/24. Over the past 1-2 weeks he feels that Left eye vision may be more blurry but doesn't think any new/worsening visual field deficits. No retro-orbital pain or HA.     MRI Orbits 6/3/24 demonstrated stable to slight reduction in size of Left posterior orbital mass; no evidence of other intracranial disease.          Plan   1) I will see him back in 3 months with MRI Orbits prior.   2) He will follow up with Dr. Linn for continued systemic management.            CHIEF COMPLAINT: Follow up after Left orbit radiation    HPI/Focused ROS:       Past Medical History:   Diagnosis Date    Breast cancer     Cancer     R breast    Diabetes mellitus     HTN (hypertension)     Leg edema, right     Prediabetes     Seizures     at age 18 - stress related    Therapy     marital counseling        Past Surgical History:   Procedure Laterality Date    AXILLARY NODE DISSECTION Right 2019    Procedure: LYMPHADENECTOMY, AXILLARY RIGHT;  Surgeon: Shima Whyte MD;  Location: [facility];  Service: General;  Laterality: Right;    AXILLARY NODE DISSECTION Right 2019    Procedure: LYMPHADENECTOMY, AXILLARY;  Surgeon: Shima Whyte MD;  Location: [facility] OR [address] [place] [address];  Service: General;  Laterality: Right;    BREAST BIOPSY      EXCISION OF HYDROCELE Right 10/28/2022    Procedure: HYDROCELECTOMY;  Surgeon: Anthony Cordero Jr., MD;  Location: [facility] OR [address] [place] [address];  Service: Urology;  Laterality: Right;  1 hour    INSERTION OF TUNNELED CENTRAL VENOUS CATHETER (CVC) WITH SUBCUTANEOUS PORT Left 2019    Procedure: ETUAALGHM-LLKV-C-CATH;  Surgeon: Shima Whyte MD;  Location: [facility] OR [address] [place] [address];  Service: General;  Laterality: Left;    INSERTION OF TUNNELED CENTRAL VENOUS CATHETER (CVC) WITH SUBCUTANEOUS PORT Left 2021    Procedure: INSERTION, SINGLE LUMEN CATHETER WITH PORT, WITH FLUOROSCOPIC GUIDANCE, right;  Surgeon: Gloria Holliday MD;  Location: [facility] OR [address] [place] [address];  Service: General;  Laterality: Left;  rapid covid test    SENTINEL LYMPH NODE BIOPSY Right 2019    Procedure: BIOPSY, LYMPH NODE, SENTINEL RIGHT;  Surgeon: Shima Whyte MD;  Location: [facility];  Service: General;  Laterality: Right;    SIMPLE MASTECTOMY Right 2019    Procedure: MASTECTOMY, SIMPLE RIGHT (CONSENT AM OF) 2.5 hr case;  Surgeon: Shima Whyte MD;  Location: [facility];  Service: General;  Laterality: Right;       Social History     Tobacco Use    Smoking status: Former     Current packs/day: 0.00     Average packs/day: 0.3 packs/day for 15.0 years (3.8 ttl pk-yrs)     Types: Cigarettes, Vaping with nicotine     Start date: 1999     Quit date: 2014     Years since quittin.5    Smokeless tobacco: Never    Tobacco comments:     Social smoker with alcohol    Substance Use Topics    Alcohol use: Yes      Alcohol/week: 0.0 standard drinks of alcohol     Comment: Rarely    Drug use: Not Currently     Types: Marijuana       Cancer-related family history includes Breast cancer in his paternal grandmother.    Current Outpatient Medications on File Prior to Visit   Medication Sig Dispense Refill    ALPRAZolam (XANAX) 0.5 MG tablet Take 1 tablet (0.5 mg total) by mouth 3 (three) times daily as needed for Insomnia or Anxiety. 60 tablet 0    amLODIPine (NORVASC) 10 MG tablet TAKE 1 TABLET (10 MG) BY MOUTH EVERY DAY 90 tablet 3    bisacodyL (DULCOLAX) 5 mg EC tablet Take 1 tablet (5 mg total) by mouth daily as needed for Constipation. 30 tablet 1    buPROPion (WELLBUTRIN XL) 150 MG TB24 tablet Take 1 tablet (150 mg total) by mouth once daily. 30 tablet 2    calcium-vitamin D3 (OYSTER SHELL CALCIUM-VIT D3) 500 mg-5 mcg (200 unit) per tablet Take 1 tablet by mouth 2 (two) times daily. 180 tablet 3    diphenoxylate-atropine 2.5-0.025 mg (LOMOTIL) 2.5-0.025 mg per tablet Take 1 tablet by mouth 4 (four) times daily as needed for Diarrhea. 60 tablet 2    dulaglutide (TRULICITY) 1.5 mg/0.5 mL pen injector Inject 1.5 mg into the skin once a week. 4 pen 5    ferrous sulfate (IRON) 325 mg (65 mg iron) Tab tablet Take 1 tablet (325 mg total) by mouth once daily. 90 tablet 1    gabapentin (NEURONTIN) 300 MG capsule Take 3 capsules (900 mg total) by mouth 3 (three) times daily. 270 capsule 2    hydroCHLOROthiazide (HYDRODIURIL) 25 MG tablet TAKE 1 TABLET BY MOUTH ONCE DAILY 90 tablet 3    HYDROcodone-acetaminophen (NORCO)  mg per tablet Take 1 tablet by mouth every 8 (eight) hours as needed for Pain. 90 tablet 0    ibuprofen (ADVIL,MOTRIN) 600 MG tablet Take 1 tablet (600 mg total) by mouth every 8 (eight) hours as needed for Pain. 20 tablet 0    insulin detemir U-100, Levemir, (LEVEMIR FLEXPEN) 100 unit/mL (3 mL) InPn pen Inject 11 Units into the skin every evening. Patient had low blood sugar while in-patient, decreasing dose 6 mL  "1    insulin lispro (HUMALOG KWIKPEN INSULIN) 100 unit/mL pen Inject 5 Units into the skin 3 (three) times daily. (Patient not taking: Reported on 5/9/2024) 6 mL 2    lancets 33 gauge Misc 1 lancet by Misc.(Non-Drug; Combo Route) route once daily. 100 each 3    lancing device Misc 1 Device by Misc.(Non-Drug; Combo Route) route 2 (two) times daily with meals. 1 each 0    LIDOcaine (LIDODERM) 5 % Place 1 patch onto the skin once daily. Remove & Discard patch within 12 hours or as directed by MD Shea patch 0    LIDOcaine (LIDODERM) 5 % Place 1 patch onto the skin once daily. Remove & Discard patch within 12 hours or as directed by MD Shea patch 0    loratadine (CLARITIN) 10 mg tablet Take 1 tablet (10 mg total) by mouth once daily. 30 tablet 3    losartan (COZAAR) 50 MG tablet Take 2 tablets by mouth once daily 180 tablet 3    metFORMIN (GLUCOPHAGE) 500 MG tablet Take 2 tablets (1,000 mg total) by mouth 2 (two) times daily with meals. Needs appointment for future refills 120 tablet 2    morphine (MS CONTIN) 15 MG 12 hr tablet Take 1 tablet (15 mg total) by mouth 2 (two) times daily. 30 tablet 0    nortriptyline (PAMELOR) 25 MG capsule Take 1 capsule (25 mg total) by mouth every evening. 30 capsule 2    OLANZapine (ZYPREXA) 5 MG tablet Take 1 tablet (5 mg total) by mouth every evening. days 1-4 of each chemotherapy cycle. 30 tablet 0    ondansetron (ZOFRAN-ODT) 8 MG TbDL Dissolve 1 tablet (8 mg total) by mouth or dissolve on tongue every 8 (eight) hours as needed (nausea/vomiting).  Let saliva dissolve tablet. (Patient not taking: Reported on 5/9/2024) 60 tablet 5    ondansetron (ZOFRAN-ODT) 8 MG TbDL Take 1 tablet (8 mg total) by mouth every 8 (eight) hours as needed (nausea). 30 tablet 2    pantoprazole (PROTONIX) 40 MG tablet Take 1 tablet (40 mg total) by mouth once daily. While taking dexamethasone 30 tablet 1    pen needle, diabetic (BD ULTRA-FINE TANESHA PEN NEEDLE) 32 gauge x 5/32" Ndle Use as directed with novolog and " "levemir (4 times daily) 100 each 5    ribociclib (KISQALI) 600 mg/day (200 mg x 3) Tab Take 600 mg by mouth once daily. 63 tablet 11    tadalafiL (CIALIS) 5 MG tablet Take 2 tablets (10 mg total) by mouth daily as needed for Erectile Dysfunction. (Patient not taking: Reported on 3/26/2024) 20 tablet 1    [DISCONTINUED] FLUoxetine 40 MG capsule Take 2 capsules (80 mg total) by mouth once daily. 60 capsule 0    [DISCONTINUED] insulin aspart U-100 (NOVOLOG FLEXPEN U-100 INSULIN) 100 unit/mL (3 mL) InPn pen Inject 5 Units into the skin 3 (three) times daily with meals. Use sliding scale provided to patient 6 mL 2    [DISCONTINUED] losartan-hydrochlorothiazide 100-25 mg (HYZAAR) 100-25 mg per tablet TAKE 1 TABLET BY MOUTH EVERY DAY 90 tablet 3     No current facility-administered medications on file prior to visit.       Review of patient's allergies indicates:  No Known Allergies      Vital Signs: /76 (BP Location: Left arm, Patient Position: Sitting)   Pulse 108   Temp 96.8 °F (36 °C)   Resp 18   Ht 6' 1" (1.854 m)   Wt 127.1 kg (280 lb 3.3 oz)   SpO2 100%   BMI 36.97 kg/m²     ECOG Performance Status: (0) Fully active, able to carry on all predisease performance without restriction    Physical Exam  Constitutional:       Appearance: Normal appearance.   HENT:      Head: Normocephalic and atraumatic.   Eyes:      General: No scleral icterus.     Extraocular Movements: Extraocular movements intact.   Pulmonary:      Effort: No accessory muscle usage or respiratory distress.   Musculoskeletal:      Cervical back: Normal range of motion.   Neurological:      Mental Status: He is alert and oriented to person, place, and time.   Psychiatric:         Mood and Affect: Mood and affect normal.         Judgment: Judgment normal.          Labs:    Imaging: I have personally reviewed the patient's available images and reports and summarized pertinent findings above in HPI.     Pathology: I have personally reviewed the " patient's available pathology and summarized pertinent findings above in HPI.

## 2024-06-06 ENCOUNTER — LAB VISIT (OUTPATIENT)
Dept: LAB | Facility: HOSPITAL | Age: 49
End: 2024-06-06
Payer: MEDICARE

## 2024-06-06 ENCOUNTER — TELEPHONE (OUTPATIENT)
Dept: PALLIATIVE MEDICINE | Facility: CLINIC | Age: 49
End: 2024-06-06
Payer: MEDICARE

## 2024-06-06 DIAGNOSIS — C50.021 MALIGNANT NEOPLASM INVOLVING BOTH NIPPLE AND AREOLA OF RIGHT BREAST IN MALE, ESTROGEN RECEPTOR NEGATIVE: ICD-10-CM

## 2024-06-06 DIAGNOSIS — Z17.1 MALIGNANT NEOPLASM INVOLVING BOTH NIPPLE AND AREOLA OF RIGHT BREAST IN MALE, ESTROGEN RECEPTOR NEGATIVE: ICD-10-CM

## 2024-06-06 LAB
ALBUMIN SERPL BCP-MCNC: 3.1 G/DL (ref 3.5–5.2)
ALP SERPL-CCNC: 229 U/L (ref 55–135)
ALT SERPL W/O P-5'-P-CCNC: 106 U/L (ref 10–44)
ANION GAP SERPL CALC-SCNC: 12 MMOL/L (ref 8–16)
AST SERPL-CCNC: 112 U/L (ref 10–40)
BILIRUB SERPL-MCNC: 0.6 MG/DL (ref 0.1–1)
BUN SERPL-MCNC: 16 MG/DL (ref 6–20)
CALCIUM SERPL-MCNC: 10.2 MG/DL (ref 8.7–10.5)
CHLORIDE SERPL-SCNC: 100 MMOL/L (ref 95–110)
CO2 SERPL-SCNC: 26 MMOL/L (ref 23–29)
CREAT SERPL-MCNC: 1 MG/DL (ref 0.5–1.4)
ERYTHROCYTE [DISTWIDTH] IN BLOOD BY AUTOMATED COUNT: 18.5 % (ref 11.5–14.5)
EST. GFR  (NO RACE VARIABLE): >60 ML/MIN/1.73 M^2
GLUCOSE SERPL-MCNC: 84 MG/DL (ref 70–110)
HCT VFR BLD AUTO: 28.7 % (ref 40–54)
HGB BLD-MCNC: 8.7 G/DL (ref 14–18)
IMM GRANULOCYTES # BLD AUTO: 0.05 K/UL (ref 0–0.04)
MCH RBC QN AUTO: 21.7 PG (ref 27–31)
MCHC RBC AUTO-ENTMCNC: 30.3 G/DL (ref 32–36)
MCV RBC AUTO: 72 FL (ref 82–98)
NEUTROPHILS # BLD AUTO: 2.3 K/UL (ref 1.8–7.7)
PLATELET # BLD AUTO: 261 K/UL (ref 150–450)
PMV BLD AUTO: 9 FL (ref 9.2–12.9)
POTASSIUM SERPL-SCNC: 4 MMOL/L (ref 3.5–5.1)
PROT SERPL-MCNC: 8.9 G/DL (ref 6–8.4)
RBC # BLD AUTO: 4.01 M/UL (ref 4.6–6.2)
SODIUM SERPL-SCNC: 138 MMOL/L (ref 136–145)
WBC # BLD AUTO: 3.34 K/UL (ref 3.9–12.7)

## 2024-06-06 PROCEDURE — 85027 COMPLETE CBC AUTOMATED: CPT | Performed by: NURSE PRACTITIONER

## 2024-06-06 PROCEDURE — 80053 COMPREHEN METABOLIC PANEL: CPT | Performed by: NURSE PRACTITIONER

## 2024-06-06 PROCEDURE — 36415 COLL VENOUS BLD VENIPUNCTURE: CPT | Performed by: NURSE PRACTITIONER

## 2024-06-06 NOTE — TELEPHONE ENCOUNTER
"Your fax has been successfully sent to [phone] at [phone] for [facility].  ------------------------------------------------------------    6/6/2024 4:06:47 PM Transmission Record          Sent to [phone] with remote ID "[facility] "          Result: (0/339;0/0) Success          Page record: 1 - 3          Elapsed time: 01:00 on channel 6    "

## 2024-06-10 DIAGNOSIS — C79.31 SECONDARY MALIGNANT NEOPLASM OF BRAIN: ICD-10-CM

## 2024-06-10 DIAGNOSIS — Z17.1 MALIGNANT NEOPLASM INVOLVING BOTH NIPPLE AND AREOLA OF RIGHT BREAST IN MALE, ESTROGEN RECEPTOR NEGATIVE: Primary | ICD-10-CM

## 2024-06-10 DIAGNOSIS — C50.021 MALIGNANT NEOPLASM INVOLVING BOTH NIPPLE AND AREOLA OF RIGHT BREAST IN MALE, ESTROGEN RECEPTOR NEGATIVE: Primary | ICD-10-CM

## 2024-06-10 DIAGNOSIS — C79.51 MALIGNANT NEOPLASM METASTATIC TO BONE: ICD-10-CM

## 2024-06-11 ENCOUNTER — OFFICE VISIT (OUTPATIENT)
Dept: NEUROSURGERY | Facility: CLINIC | Age: 49
End: 2024-06-11
Payer: MEDICARE

## 2024-06-11 DIAGNOSIS — C50.021 MALIGNANT NEOPLASM INVOLVING BOTH NIPPLE AND AREOLA OF RIGHT BREAST IN MALE, ESTROGEN RECEPTOR NEGATIVE: ICD-10-CM

## 2024-06-11 DIAGNOSIS — C79.31 SECONDARY MALIGNANT NEOPLASM OF BRAIN: ICD-10-CM

## 2024-06-11 DIAGNOSIS — H54.7 UNSPECIFIED VISUAL LOSS: Primary | ICD-10-CM

## 2024-06-11 DIAGNOSIS — C79.31 METASTASIS TO BRAIN: ICD-10-CM

## 2024-06-11 DIAGNOSIS — Z17.1 MALIGNANT NEOPLASM INVOLVING BOTH NIPPLE AND AREOLA OF RIGHT BREAST IN MALE, ESTROGEN RECEPTOR NEGATIVE: ICD-10-CM

## 2024-06-11 PROCEDURE — 99214 OFFICE O/P EST MOD 30 MIN: CPT | Mod: S$GLB,,, | Performed by: NEUROLOGICAL SURGERY

## 2024-06-11 PROCEDURE — 1159F MED LIST DOCD IN RCRD: CPT | Mod: CPTII,S$GLB,, | Performed by: NEUROLOGICAL SURGERY

## 2024-06-11 PROCEDURE — 4010F ACE/ARB THERAPY RXD/TAKEN: CPT | Mod: CPTII,S$GLB,, | Performed by: NEUROLOGICAL SURGERY

## 2024-06-11 PROCEDURE — 1160F RVW MEDS BY RX/DR IN RCRD: CPT | Mod: CPTII,S$GLB,, | Performed by: NEUROLOGICAL SURGERY

## 2024-06-11 NOTE — PATIENT INSTRUCTIONS
I have personally reviewed the MRI ORBITS W W/O CONTRAST with the pt which shows stable appearance of the brain and orbits. Stable enhancing lesion within the left orbit extending to the pterygoid palatine fossa/inferior orbital fissure.  Metastatic disease to be considered in light of the history however other processes including lymphoma and idiopathic orbital inflammation/IgG4 related inflammatory syndrome could have a similar appearance.    I will schedule the patient for a f/u in 3mo with new MRI orbits for tumor surveillance.   Pt advised to contact me if any new symptoms arise.

## 2024-06-11 NOTE — PROGRESS NOTES
Subjective:   I, Gerardo Farmer, attest that this documentation has been prepared under the direction and in the presence of Goyo Diaz MD.     Patient ID: Paul Li Jr. is a 48 y.o. male     Chief Complaint: No chief complaint on file.      HPI  Mr. Paul Li Jr. is a 48 y.o. gentleman w/ h/o metastatic breast cancer, HTN, DM, seizure disorder presents to the neurosurgery clinic today for a 3mo f/u after treatment of his left eye tumor with fractionated XRT (30 Gy in 10 fractions).  This is a pt that presented to the ED on 3/20/24 for vision loss in the left eye that began in the morning and woke him up from sleep as per chart review. At the time of his visit, MRI done on 3/20 revealed an abnormal soft tissue lesion in the posterior left orbital apex/optic canal which is adjacent or involving the inferior rectus extra-ocular muscles with associated enhancement, and could represent metastatic disease.  Acute neurosurgical intervention was not recommended at that time.      Today the pt reports the clarity in his vision initially improved after radiosurgery but plateaued in improvement after 2 weeks.  Pt states that his vision has eventually improved further but not as good as baseline. Pts wife endorses unsteady gait intermittently but has no other new neurological complaints.      Review of Systems   Constitutional:  Negative for activity change, appetite change, fatigue, fever and unexpected weight change.   HENT:  Negative for facial swelling.    Eyes:  Positive for visual disturbance (decreased peripheral vision).   Respiratory: Negative.     Cardiovascular: Negative.    Gastrointestinal:  Negative for diarrhea, nausea and vomiting.   Endocrine: Negative.    Genitourinary: Negative.    Musculoskeletal:  Positive for gait problem. Negative for back pain, joint swelling, myalgias and neck pain.   Neurological:  Negative for dizziness, seizures, weakness, numbness and headaches.   Psychiatric/Behavioral:  Negative.          Past Medical History:   Diagnosis Date    Breast cancer     Cancer     R breast    Diabetes mellitus     HTN (hypertension)     Leg edema, right     Prediabetes     Seizures     at age 18 - stress related    Therapy     marital counseling       Objective:    There were no vitals filed for this visit.   Physical Exam  Constitutional:       General: He is not in acute distress.     Appearance: Normal appearance.   HENT:      Head: Normocephalic and atraumatic.   Pulmonary:      Effort: Pulmonary effort is normal.   Musculoskeletal:      Cervical back: Neck supple.   Neurological:      Mental Status: He is alert and oriented to person, place, and time.      GCS: GCS eye subscore is 4. GCS verbal subscore is 5. GCS motor subscore is 6.      Cranial Nerves: No cranial nerve deficit.       IMAGING:  MRI ORBITS W W/O CONTRAST 6/4/24:  Stable appearance of the brain and orbits. Stable enhancing lesion within the left orbit extending to the pterygoid palatine fossa/inferior orbital fissure.  Metastatic disease to be considered in light of the history however other processes including lymphoma and idiopathic orbital inflammation/IgG4 related inflammatory syndrome could have a similar appearance.    I have personally reviewed the images with the pt.      I, Dr. Goyo Diaz, personally performed the services described in this documentation. All medical record entries made by the scribe, Gerardo Farmer, were at my direction and in my presence.  I have reviewed the chart and agree that the record reflects my personal performance and is accurate and complete. Goyo Diaz MD. 06/11/2024    Assessment:       Breast cancer.  Metastasis to right orbit.      Plan:   I have personally reviewed the MRI ORBITS W W/O CONTRAST with the pt which shows stable appearance of the brain and orbits. Stable enhancing lesion within the left orbit extending to the pterygoid palatine fossa/inferior orbital fissure.  Metastatic  disease to be considered in light of the history however other processes including lymphoma and idiopathic orbital inflammation/IgG4 related inflammatory syndrome could have a similar appearance.    I will schedule the patient for a f/u in 3mo with new MRI orbits for tumor surveillance.   Pt advised to contact me if any new symptoms arise.

## 2024-06-12 ENCOUNTER — DOCUMENTATION ONLY (OUTPATIENT)
Dept: HEMATOLOGY/ONCOLOGY | Facility: CLINIC | Age: 49
End: 2024-06-12

## 2024-06-12 ENCOUNTER — LAB VISIT (OUTPATIENT)
Dept: LAB | Facility: HOSPITAL | Age: 49
End: 2024-06-12
Attending: INTERNAL MEDICINE
Payer: MEDICARE

## 2024-06-12 ENCOUNTER — OFFICE VISIT (OUTPATIENT)
Dept: HEMATOLOGY/ONCOLOGY | Facility: CLINIC | Age: 49
End: 2024-06-12
Payer: MEDICARE

## 2024-06-12 VITALS
OXYGEN SATURATION: 100 % | HEART RATE: 106 BPM | SYSTOLIC BLOOD PRESSURE: 124 MMHG | DIASTOLIC BLOOD PRESSURE: 70 MMHG | WEIGHT: 282.19 LBS | TEMPERATURE: 98 F | HEIGHT: 73 IN | RESPIRATION RATE: 16 BRPM | BODY MASS INDEX: 37.4 KG/M2

## 2024-06-12 DIAGNOSIS — C50.021 MALIGNANT NEOPLASM INVOLVING BOTH NIPPLE AND AREOLA OF RIGHT BREAST IN MALE, ESTROGEN RECEPTOR NEGATIVE: ICD-10-CM

## 2024-06-12 DIAGNOSIS — G89.3 NEOPLASM RELATED PAIN: ICD-10-CM

## 2024-06-12 DIAGNOSIS — C79.31 SECONDARY MALIGNANT NEOPLASM OF BRAIN: ICD-10-CM

## 2024-06-12 DIAGNOSIS — C80.1 ANXIETY ASSOCIATED WITH CANCER DIAGNOSIS: ICD-10-CM

## 2024-06-12 DIAGNOSIS — C79.51 MALIGNANT NEOPLASM METASTATIC TO BONE: ICD-10-CM

## 2024-06-12 DIAGNOSIS — C79.51 MALIGNANT NEOPLASM METASTATIC TO BONE: Primary | ICD-10-CM

## 2024-06-12 DIAGNOSIS — F41.1 ANXIETY ASSOCIATED WITH CANCER DIAGNOSIS: ICD-10-CM

## 2024-06-12 DIAGNOSIS — E11.65 UNCONTROLLED TYPE 2 DIABETES MELLITUS WITH HYPERGLYCEMIA: ICD-10-CM

## 2024-06-12 DIAGNOSIS — C78.7 SECONDARY LIVER CANCER: ICD-10-CM

## 2024-06-12 DIAGNOSIS — I10 PRIMARY HYPERTENSION: ICD-10-CM

## 2024-06-12 DIAGNOSIS — D63.0 ANEMIA IN NEOPLASTIC DISEASE: ICD-10-CM

## 2024-06-12 DIAGNOSIS — Z17.1 MALIGNANT NEOPLASM INVOLVING BOTH NIPPLE AND AREOLA OF RIGHT BREAST IN MALE, ESTROGEN RECEPTOR NEGATIVE: ICD-10-CM

## 2024-06-12 LAB
ALBUMIN SERPL BCP-MCNC: 2.9 G/DL (ref 3.5–5.2)
ALP SERPL-CCNC: 227 U/L (ref 55–135)
ALT SERPL W/O P-5'-P-CCNC: 43 U/L (ref 10–44)
ANION GAP SERPL CALC-SCNC: 16 MMOL/L (ref 8–16)
AST SERPL-CCNC: 39 U/L (ref 10–40)
BASOPHILS # BLD AUTO: 0.01 K/UL (ref 0–0.2)
BASOPHILS NFR BLD: 0.2 % (ref 0–1.9)
BILIRUB SERPL-MCNC: 0.7 MG/DL (ref 0.1–1)
BUN SERPL-MCNC: 17 MG/DL (ref 6–20)
CALCIUM SERPL-MCNC: 9.9 MG/DL (ref 8.7–10.5)
CHLORIDE SERPL-SCNC: 103 MMOL/L (ref 95–110)
CO2 SERPL-SCNC: 21 MMOL/L (ref 23–29)
CREAT SERPL-MCNC: 1.4 MG/DL (ref 0.5–1.4)
DIFFERENTIAL METHOD BLD: ABNORMAL
EOSINOPHIL # BLD AUTO: 0 K/UL (ref 0–0.5)
EOSINOPHIL NFR BLD: 0.3 % (ref 0–8)
ERYTHROCYTE [DISTWIDTH] IN BLOOD BY AUTOMATED COUNT: 19.3 % (ref 11.5–14.5)
EST. GFR  (NO RACE VARIABLE): >60 ML/MIN/1.73 M^2
GLUCOSE SERPL-MCNC: 114 MG/DL (ref 70–110)
HCT VFR BLD AUTO: 27.1 % (ref 40–54)
HGB BLD-MCNC: 8 G/DL (ref 14–18)
IMM GRANULOCYTES # BLD AUTO: 0.06 K/UL (ref 0–0.04)
IMM GRANULOCYTES NFR BLD AUTO: 1 % (ref 0–0.5)
LYMPHOCYTES # BLD AUTO: 0.7 K/UL (ref 1–4.8)
LYMPHOCYTES NFR BLD: 11.8 % (ref 18–48)
MCH RBC QN AUTO: 21.8 PG (ref 27–31)
MCHC RBC AUTO-ENTMCNC: 29.5 G/DL (ref 32–36)
MCV RBC AUTO: 74 FL (ref 82–98)
MONOCYTES # BLD AUTO: 0.7 K/UL (ref 0.3–1)
MONOCYTES NFR BLD: 11.8 % (ref 4–15)
NEUTROPHILS # BLD AUTO: 4.7 K/UL (ref 1.8–7.7)
NEUTROPHILS NFR BLD: 74.9 % (ref 38–73)
NRBC BLD-RTO: 0 /100 WBC
PLATELET # BLD AUTO: 314 K/UL (ref 150–450)
PMV BLD AUTO: 10.6 FL (ref 9.2–12.9)
POTASSIUM SERPL-SCNC: 3.8 MMOL/L (ref 3.5–5.1)
PROT SERPL-MCNC: 8.2 G/DL (ref 6–8.4)
RBC # BLD AUTO: 3.67 M/UL (ref 4.6–6.2)
SODIUM SERPL-SCNC: 140 MMOL/L (ref 136–145)
WBC # BLD AUTO: 6.25 K/UL (ref 3.9–12.7)

## 2024-06-12 PROCEDURE — 99999 PR PBB SHADOW E&M-EST. PATIENT-LVL V: CPT | Mod: PBBFAC,,, | Performed by: INTERNAL MEDICINE

## 2024-06-12 PROCEDURE — 80053 COMPREHEN METABOLIC PANEL: CPT | Performed by: INTERNAL MEDICINE

## 2024-06-12 PROCEDURE — 3074F SYST BP LT 130 MM HG: CPT | Mod: CPTII,S$GLB,, | Performed by: INTERNAL MEDICINE

## 2024-06-12 PROCEDURE — 3078F DIAST BP <80 MM HG: CPT | Mod: CPTII,S$GLB,, | Performed by: INTERNAL MEDICINE

## 2024-06-12 PROCEDURE — 1160F RVW MEDS BY RX/DR IN RCRD: CPT | Mod: CPTII,S$GLB,, | Performed by: INTERNAL MEDICINE

## 2024-06-12 PROCEDURE — 99215 OFFICE O/P EST HI 40 MIN: CPT | Mod: S$GLB,,, | Performed by: INTERNAL MEDICINE

## 2024-06-12 PROCEDURE — 1159F MED LIST DOCD IN RCRD: CPT | Mod: CPTII,S$GLB,, | Performed by: INTERNAL MEDICINE

## 2024-06-12 PROCEDURE — 85025 COMPLETE CBC W/AUTO DIFF WBC: CPT | Performed by: INTERNAL MEDICINE

## 2024-06-12 PROCEDURE — 4010F ACE/ARB THERAPY RXD/TAKEN: CPT | Mod: CPTII,S$GLB,, | Performed by: INTERNAL MEDICINE

## 2024-06-12 PROCEDURE — 36415 COLL VENOUS BLD VENIPUNCTURE: CPT | Performed by: INTERNAL MEDICINE

## 2024-06-12 PROCEDURE — 3008F BODY MASS INDEX DOCD: CPT | Mod: CPTII,S$GLB,, | Performed by: INTERNAL MEDICINE

## 2024-06-12 PROCEDURE — G2211 COMPLEX E/M VISIT ADD ON: HCPCS | Mod: S$GLB,,, | Performed by: INTERNAL MEDICINE

## 2024-06-12 NOTE — PROGRESS NOTES
Subjective     Patient ID: Paul Li Jr. is a 48 y.o. male.    Chief Complaint: Malignant neoplasm involving both nipple and areola of righ    HPI    Presents for follow up  Gemcitabine held last week with elevated transaminases  Improved today after hold    Notes the following:  Dizziness with standing (he also has some equilibrium issues with the eye blindness separately)  He is currently on 3 BP medications and likely not needed now with the weight changes-  Discussed below:  Losartan 100 mg - hold  Amlodipine  HCTZ- every 3rd day    His BG has been better controlled  Trulicity stopped 1 month ago    He has noted more sweating spells- facial and head area starts sweating  Nortriptyline added for insomnia by palliative - now on 50 mg and this is when he noted    No HAs since XRT to orbit  Dizziness with positional change    Left shoulder pain- has bruise there now from fall- this is new since fall 2 days ago    Other areas of pain:  Pain upper T spine and will appear swollen- notes if sits up too long  Lidocaine patches  Lower back pain as well    Weight 282.3 lbs  Eats a well balanced meal when he eats  Wife pushing nutrition  May miss meals- when his dad cooks dinner he will eat  He is getting protein shakes from SW    Dyspnea with exertion  No chest pain, palpitations    No infections  No rashes    - 5/9/2024 PET on return to [place]  Now on gemcitabine    FINDINGS:  In the head and neck, hypermetabolic focus in the posterior left orbital apex correlating with soft tissue lesion reaching max SUV of 42 (fused image 42).  There are no hypermetabolic mucosal lesions.  There are no pathologically enlarged or hypermetabolic lymph nodes.     In the chest, postop changes of right mastectomy dissection.  Decreased tracer uptake of the overlying mastectomy scar.  Overall increased number of hypermetabolic mediastinal and hilar lymph nodes.  Few scattered pulmonary nodules with minimal tracer uptake. Index lesions  follows:     *1.2 cm subcarinal lymph node with SUV max 6.3 (fused image 135), previously similar in size with SUV max 11.  *Left hilar lymph node with SUV max 13 (fused image 132) without definitive CT correlate, which is new when compared to prior imaging.  *9 mm solid nodule in the right lower lobe now demonstrating mild tracer uptake with SUV max 3.2 (fused image 142), previously measuring 9 mm with SUV max 3.2.  In the abdomen and pelvis, there is a new hypermetabolic liver lesion and left pelvic lymph nodes with index lesions as follows:     *New 7.1 x 5.6 cm hypoattenuating left hepatic lobe lesion with tracer avid uptake reaching SUV max of 21 (fused image 181).  *New 0.8 cm left external iliac node with SUV max 9.2 (fused image 276).  There is physiologic tracer distribution within the abdominal organs and excretion into the genitourinary system.     In the bones, overall mixed treatment response of multiple hypermetabolic osseous lesions throughout the axial and appendicular skeleton noting numerous cystic lesions and decreased tracer uptake within some index lesions.  Index lesions as follows:     *Left anterior 5th rib lytic lesion with SUV max 9.9 (fused image 164), previous SUV 9.4.  *T8 vertebral body with increased sclerosis and decreased tracer uptake with SUV max 3.7 (fused image 150), previous SUV 7.7.  *New tracer avid uptake of the C7 vertebral body with SUV 10 (fused image 97).  In the extremities, there are no hypermetabolic lesions worrisome for malignancy.     Additional findings:     Right maxillary mucosal retention cyst.  Left-sided chest port with tip terminating in the distal SVC.     Mild mesenteric edema with scattered non hypermetabolic mesenteric lymph nodes.  Small volume pelvic free fluid.  Mild prostatomegaly.     Impression:     Findings overall concerning for disease progression in patient with known metastatic breast cancer status post right mastectomy and right axillary lymph  node dissection.     Increased number of hypermetabolic mediastinal and hilar lymph nodes.  New hypermetabolic left pelvic lymph nodes.     New hypermetabolic hepatic mass concerning for metastasis.     Mixed osseous treatment response noting numerous new lesions and decreased uptake within some index lesions.     Hypermetabolic focus in the left orbital apex concerning for metastasis.     Additional findings as above.    Oncology History  Malignant neoplasm involving both nipple and areola of right breast in male, estrogen receptor negative- initially Stage IB, now metastatic  Metastatic Triple Negative Breast Cancer: progressed from prior Stage IB   5/2/2019: Right breast retroareolar core biopsy  S/p Right breast mastectomy with axillary lymph node dissection in 2019  S/p neoadjuvant ddAC/carbo-taxol, adjuvant xeloda, XRT   Recurrence with bone metastasis in 8/2021, received abraxane and progressed on it in 3/2023  Started on Trodelvy in 5/2023 - ongoing  - His schedule of trodelvy is [id number] with growth factor support due to   neutropenia   Liquid biopsy: PIK3CA mutation  PDL 1 <1%  Precerted for Sacituzumab govitecan-iy-for a transfer of facility  - PET CT 10/10/23:               - reviewed with previous images  - upon our review, exam is stable except subcarinal LN increase in size (max SUV of 10.9 measuring 2.4 x 2.7 cm, previously was 1.5cm with max SUV 10)  - MRI C spine from 12/15/23 reviewed, shows degenerative changes but no concerning metastatic lesions. Reports for lumbar spine and thoracic spine not uploaded, have requested from [facility]. Reviewed images, show known T6 lesion (seen on PET scan Oct 2023) but no other clear lesions.      1.  Metastatic Breast Cancer:              1.  Right Breast Cancer Stage IB (pT1b pN1):  T=5 mm, G=2, N=1/15, ER=negative, VT=negative. MLV2suy: IHC 0, FISH=.  Ki67=80%.  MammaPrint=.  Date: 5/2/2019                          A.  Histology: Invasive ductal carcinoma  with lobular features                          B.  Biopsy: 5/2/2019: Right breast retroareolar core biopsy                          C.  Staging Studies:                          D.  NeoAdjuvant Chemo:                                       ddAC x 4 / TaxolCarbo wkly x 12.   5/27/2019 - 10/15/2019                          E.  Surgery:                                      11/22/2019: R Mastectomy/SLN Dr Whyte: Right breast mastectomy with SLN biopsy no LVI with 1 LN positive 4 mm, negative margins.                                      12/17/2019:  ALND  0/14 positive for carcinoma.                          F.  XRT: 2/3/2020- 3/23/2020: Total Dose: 50.4 Gy to the chest wall and regional lymphatic                          G.  Adjuvant Chemo:                                        Xeloda 1000 mg/m2 bid days 1-14 every 21 day x 6.   4/15/2020 - 9/28/20                 2.  Recurrence:  7/2/2021.                          A.  Biopsy:  Left iliac bone biopsy: 7/2/2021: Positive for metastatic breast ca                          B.  Staging:                                      MRI T/L spine 6/3/2021:  Abnormal  T12, S1, S2 vertebral bodies, LEFT sacrum and ilium concerning for metastatic disease.                                       PET 6/9/2021: Multiple sclerotic lesions in the T12 vertebral body, sacrum, and left iliac bone with hypermetabolic activity concerning for active metastatic disease.                                      C.  Treatment:                                      Abraxane:  8/13/2021 - 3/25/2023.     2.  Genetic Testing:  Genetic testing: AXIN2, RNF43 (VUS) identified     TREATMENT PLAN:                 SACITUZUMAB q 2wk with Neulasta support:                            5/19/2023 - 7/14/2023.                            Resumed 8/30/2023 - .                 Zometa q 4 wkly a:  8/2022 - .                  Liquid biopsy:                            PIK3CA mutation                          PDL-1 <1%.    "  Restaging:   PET: 2/15/2022: stable osseous sclerotic lesions and pulmonary micro nodules  PET 6/6/2022: stable   PET 11/4/2022:stable  PET 3/20/2023: New derick metastasis (right hilar node). Otherwise stable  MRI sacrum/coccyx 3/29/2023: Extensive osseous metastatic disease of the pelvis involving much of the sacrum.  No evidence for invasion of the sacral canal or neural foramina.  MRI L-spine 3/29/2023: Dffuse osseous metastatic disease throughout the lumbar spine.  PET 6/22/2023: Findings suggestive of disease progression, noting worsening osseous metastatic disease and subcarinal/right hilar lymphadenopathy.  PET 10/10/2023:  Stable except subcarinal LN increase per PET.  CT images are not convincing.     Moved to [place] 6/2023  Moved  back from [place] 2/2024- see summary below.            Diagnosis: Metastatic triple negative male breast cancer, BRCA1 +, progression  His most recent systemic treatment regimen was sacituzumab-govetican Q3w and he received last dose on 2/12/2024  Last PET there per notes revealed progression on 2/27/2024 and no systemic treatment since     Patient moved to [place] in June of 2023 and recently returned to [place] to re-establish care after sudden left eye blindness- he woke up that AM with acute of chronic vision loss, left orbital pain and exophthalmos  He was admitted on return from 3/20- 3/22/2024   In the ED a stroke code was activated and CTA demonstrated "Irregularity of the intracranial vertebral arteries and left PCA as above, likely atherosclerotic disease.  No high-grade stenosis or large vessel occlusion. Osseous metastatic disease similar to prior outside MRI." Opthalmology was consulted who recommended a orbital MRI which demonstrated " There is abnormal soft tissue lesion in the posterior left orbital apex/optic canal which is adjacent to or involving the inferior rectus and medial rectus extra-ocular muscles with associated enhancement, and could " "represent metastatic disease.  This abuts and compresses the posterior optic nerve at the posterior left orbital apex. There is minimal thickening and increased T2 signal with probable minimal enhancement of the anterior left optic nerve that could represent mild optic neuritis."      He has now completed XRT       Most recent imaging:  - 2/27/2024 PET- not visible in records and will obtain     - 3/20/2024 MRI orbits:  FINDINGS:  Orbits/Optic Nerves:  Minimal thickening and increased T2 signal with probable minimal enhancement of the left optic nerve could represent mild optic neuritis.  No significant abnormality on the right.  Globes appear within normal limits.  Remainder of the Intracranial Compartment:  No midline shift or hydrocephalus.  No extra-axial blood or fluid collections.  The brain parenchyma appears normal. No cerebral mass lesion, acute hemorrhage, edema, or acute infarct.  There is abnormal soft tissue lesion in the posterior left orbital apex and optic canal, which is adjacent to or involving the inferior rectus and medial rectus extra-ocular muscles.  There is mild associated enhancement.  This could represent metastatic disease.  Minimal associated restricted diffusion on axial 35 of series 6.  This abuts and may compress the posterior optic nerve at the posterior left orbital apex.  Normal vascular flow voids are preserved.  Mild bilateral ethmoid and right maxillary sinus disease.  Skull/Extracranial Contents (limited evaluation): Bone marrow signal intensity is normal.  Impression:  1. There is abnormal soft tissue lesion in the posterior left orbital apex/optic canal which is adjacent to or involving the inferior rectus and medial rectus extra-ocular muscles with associated enhancement, and could represent metastatic disease.  This abuts and compresses the posterior optic nerve at the posterior left orbital apex.  2. There is minimal thickening and increased T2 signal with probable minimal " enhancement of the anterior left optic nerve that could represent mild optic neuritis.  Follow-up recommended.  3. Mild paranasal sinus disease.  4. This report was flagged in Epic as abnormal.     Review of Systems   Constitutional:  Positive for appetite change and fatigue. Negative for activity change, chills, fever and unexpected weight change (stabilized).   Eyes:  Positive for visual disturbance.   Respiratory:  Positive for shortness of breath (with exertion). Negative for cough and wheezing.    Cardiovascular:  Negative for chest pain, palpitations and leg swelling.   Gastrointestinal:  Negative for abdominal distention, abdominal pain, change in bowel habit, constipation, diarrhea, nausea, vomiting and reflux.   Genitourinary:  Negative for bladder incontinence, decreased urine volume, difficulty urinating, frequency, hematuria and urgency.   Musculoskeletal:  Positive for arthralgias, back pain and neck pain. Negative for leg pain.   Neurological:  Positive for dizziness. Negative for weakness (better), numbness, headaches, memory loss and coordination difficulties.   Hematological:  Negative for adenopathy. Does not bruise/bleed easily.   Psychiatric/Behavioral:  Positive for sleep disturbance. Negative for dysphoric mood. The patient is nervous/anxious.           Objective     Physical Exam  Vitals and nursing note reviewed.   Constitutional:       General: He is not in acute distress.     Appearance: Normal appearance. He is not ill-appearing.   HENT:      Head: Normocephalic and atraumatic.      Mouth/Throat:      Pharynx: No oropharyngeal exudate or posterior oropharyngeal erythema.      Comments: Poor dentition  Eyes:      General: No scleral icterus.     Comments: Left eye visual change   Cardiovascular:      Rate and Rhythm: Normal rate and regular rhythm.      Heart sounds: No murmur heard.     No friction rub. No gallop.   Pulmonary:      Effort: Pulmonary effort is normal. No respiratory  distress.      Breath sounds: Normal breath sounds. No wheezing, rhonchi or rales.   Abdominal:      General: Abdomen is flat. Bowel sounds are normal. There is no distension.      Palpations: Abdomen is soft. There is no mass.      Tenderness: There is no guarding.   Musculoskeletal:         General: Swelling (lymphedema better RUE) present. No tenderness. Normal range of motion.      Cervical back: Normal range of motion and neck supple.      Right lower leg: No edema.      Left lower leg: No edema.   Skin:     General: Skin is warm and dry.      Coloration: Skin is not jaundiced or pale.   Neurological:      Mental Status: He is alert and oriented to person, place, and time.      Sensory: Sensory deficit present.      Motor: No weakness.      Gait: Gait normal.      Comments: Decr strength bilateral LE   Psychiatric:         Mood and Affect: Mood normal.         Behavior: Behavior normal.         Thought Content: Thought content normal.         Judgment: Judgment normal.          Assessment and Plan     1. Bone metastases  Overview:  O Regualtory Update 4/1/23      2. Malignant neoplasm involving both nipple and areola of right breast in male, estrogen receptor negative  Overview:  1.  Metastatic Breast Cancer:   1.  Right Breast Cancer Stage IB (pT1b pN1):  T=5 mm, G=2, N=1/15, ER=negative, OH=negative. [id number]: IHC 0, FISH=.  Ki67=80%.  MammaPrint=.  Date: 5/2/2019    A.  Histology: Invasive ductal carcinoma with lobular features    B.  Biopsy: 5/2/2019: Right breast retroareolar core biopsy    C.  Staging Studies:    D.  NeoAdjuvant Chemo:      ddAC x 4 / TaxolCarbo wkly x 12.   5/27/2019 - 10/15/2019    E.  Surgery:     11/22/2019: R Mastectomy/SLN Dr Whyte: Right breast mastectomy with SLN biopsy no LVI with 1 LN positive 4 mm, negative margins.     12/17/2019:  ALND  0/14 positive for carcinoma.    F.  XRT: 2/3/2020- 3/23/2020: Total Dose: 50.4 Gy to the chest wall and regional lymphatic    G.  Adjuvant  Chemo:       Xeloda 1000 mg/m2 bid days 1-14 every 21 day x 6.   4/15/2020 - 9/28/20     2.  Recurrence:  7/2/2021.    A.  Biopsy:  Left iliac bone biopsy: 7/2/2021: Positive for metastatic breast ca    B.  Staging:     MRI T/L spine 6/3/2021:  Abnormal  T12, S1, S2 vertebral bodies, LEFT sacrum and ilium concerning for metastatic disease.      PET 6/9/2021: Multiple sclerotic lesions in the T12 vertebral body, sacrum, and left iliac bone with hypermetabolic activity concerning for active metastatic disease.        C.  Treatment:     Abraxane:  8/13/2021 - 3/25/2023.     SACITUZUMAB q 2wk with Neulasta support:        5/19/2023 - 7/14/2023.        Resumed 8/30/2023 - 2/14/2024.  Progression following stable disease.    2.  Genetic Testing:  Genetic testing: AXIN2, RNF43 (VUS) identified.    3.  Vision loss L eye 2/28/2024.    TREATMENT PLAN:     Vision loss OS:  Consult Dr. Colbert 2/28/2024.    MRI Brain and orbits ordered 2/28/2024.     Faslodex pending   Abemaciclib/ribociclib pending    Discontinue trazodone with ribociclib.    Zometa q 4 wk:  8/2022 - .      Liquid biopsy:      PIK3CA mutation.  Pt receive Apelisib in the past.    PDL-1 <1%.    Restaging:   PET: 2/15/2022: stable osseous sclerotic lesions and pulmonary micro nodules  PET 6/6/2022: stable   PET 11/4/2022:stable  PET 3/20/2023: New derick metastasis (right hilar node). Otherwise stable  MRI sacrum/coccyx 3/29/2023: Extensive osseous metastatic disease of the pelvis involving much of the sacrum.  No evidence for invasion of the sacral canal or neural foramina.  MRI L-spine 3/29/2023: Dffuse osseous metastatic disease throughout the lumbar spine.  PET 6/22/2023: Findings suggestive of disease progression, noting worsening osseous metastatic disease and subcarinal/right hilar lymphadenopathy.  PET 10/10/2023:  Stable except subcarinal LN increase per PET.  CT images are not convincing.  PET 2/27/2024:  Progression in intra-thoracic LN.        3.  Secondary malignant neoplasm of brain    4. Anxiety associated with cancer diagnosis    5. Neoplasm related pain    6. Anemia in neoplastic disease  -     Type & Screen; Future; Expected date: 06/12/2024  -     Iron and TIBC; Future; Expected date: 06/12/2024  -     CBC W/ AUTO DIFFERENTIAL; Future; Expected date: 06/12/2024  -     CMP; Future; Expected date: 06/12/2024    7. Uncontrolled type 2 diabetes mellitus with hyperglycemia    8. Secondary liver cancer    9. Primary hypertension  Overview:  - Managed by PCP      Labs reviewed    Metastatic breast cancer BRCA +  Heavily pretreated  On gemcitabine  Held 1 week with elevated liver enzymes  We discussed etiology  Restart with dose reduction and monitor closely    DM  Improved parameters   Trulicity held    HTN  See BP adjustments made above  Monitor closely      SW assistance  Food pantry    Route Chart for Scheduling    Med Onc Chart Routing  Urgent    Follow up with physician . Needs type and screen and iron studies on Friday 6/14- can they give blood post chemo??3 weeks cbc, cmp, EP and chemo next day   Follow up with JAC . 2 weeks cbcm cmp and EP and chemo next day   Infusion scheduling note    Injection scheduling note    Labs    Imaging    Pharmacy appointment    Other referrals            Treatment Plan Information   OP BREAST GEMCITABINE QW   Rosa Linn MD   Upcoming Treatment Dates - OP BREAST GEMCITABINE QW    6/7/2024       Pre-Medications       dexAMETHasone (DECADRON) 10 mg in sodium chloride 0.9% 50 mL IVPB       Chemotherapy       gemcitabine (GEMZAR) 2,630 mg in sodium chloride 0.9% SolP 250 mL chemo infusion  6/14/2024       Pre-Medications       dexAMETHasone (DECADRON) 10 mg in sodium chloride 0.9% 50 mL IVPB       Chemotherapy       gemcitabine (GEMZAR) 2,630 mg in sodium chloride 0.9% SolP 250 mL chemo infusion  6/28/2024       Pre-Medications       dexAMETHasone (DECADRON) 10 mg in sodium chloride 0.9% 50 mL IVPB       Chemotherapy        gemcitabine (GEMZAR) 2,630 mg in sodium chloride 0.9% SolP 250 mL chemo infusion  7/5/2024       Pre-Medications       dexAMETHasone (DECADRON) 10 mg in sodium chloride 0.9% 50 mL IVPB       Chemotherapy       gemcitabine (GEMZAR) 2,630 mg in sodium chloride 0.9% SolP 250 mL chemo infusion    Supportive Plan Information  OP FILGRASTIM 480 MCG   Michelle Grayson, NP   Upcoming Treatment Dates - OP FILGRASTIM 480 MCG    No upcoming days in selected categories.    Therapy Plan Information  PORT FLUSH  Flushes  heparin, porcine (PF) 100 unit/mL injection flush 500 Units  500 Units, Intravenous, Every visit  sodium chloride 0.9% flush 10 mL  10 mL, Intravenous, Every visit    ZOLEDRONIC ACID (ZOMETA) IV  Medications  zoledronic acid (ZOMETA) 4 mg in sodium chloride 0.9% 100 mL IVPB  4 mg, Intravenous, Every 4 weeks  Flushes  sodium chloride 0.9% 250 mL flush bag  Intravenous, Every 4 weeks  sodium chloride 0.9% flush 10 mL  10 mL, Intravenous, Every 4 weeks  heparin, porcine (PF) 100 unit/mL injection flush 500 Units  500 Units, Intravenous, Every 4 weeks  alteplase injection 2 mg  2 mg, Intra-Catheter, Every 4 weeks

## 2024-06-13 RX ORDER — MORPHINE SULFATE 15 MG/1
15 TABLET, FILM COATED, EXTENDED RELEASE ORAL 2 TIMES DAILY
Qty: 60 TABLET | Refills: 0 | Status: SHIPPED | OUTPATIENT
Start: 2024-06-13

## 2024-06-13 RX ORDER — LIDOCAINE 50 MG/G
1 PATCH TOPICAL DAILY
Qty: 7 PATCH | Refills: 0 | Status: SHIPPED | OUTPATIENT
Start: 2024-06-13

## 2024-06-13 RX ORDER — ALPRAZOLAM 0.5 MG/1
0.5 TABLET ORAL 3 TIMES DAILY PRN
Qty: 60 TABLET | Refills: 0 | Status: SHIPPED | OUTPATIENT
Start: 2024-06-13 | End: 2024-07-13

## 2024-06-13 NOTE — PROGRESS NOTES
Oncology Social Worker received call yesterday afternoon from patient, and Dr. Linn called me today after she saw patient in clinic. Assisted patient with two cases of Boost Glucose Control (1 vanilla and 1 chocolate), a $50 [employer] card, $25 gas card, and food from the therapeutic food pantry (messaged Nelda Kearney RD, and spoke with Ms. Tonya at [phone] who assisted with opening the pantry and getting the food items). Email sent to [facility] and received a reply from Latricia Wang; an application was on file; answered the three questions she had re: patient's current address, income amount, and oncologist's name; requesting any available assistance - [employer] cards, gas cards, farmer's market vouchers, prescription assistance when needed going forward. Met with patient, his spouse, and his father on the first floor of the [facility] near the food pantry. Follow up call to patient later today. Will continue to follow and assist as needs are identified.

## 2024-06-14 ENCOUNTER — INFUSION (OUTPATIENT)
Dept: INFUSION THERAPY | Facility: HOSPITAL | Age: 49
End: 2024-06-14
Payer: MEDICARE

## 2024-06-14 VITALS
OXYGEN SATURATION: 97 % | TEMPERATURE: 98 F | BODY MASS INDEX: 37.4 KG/M2 | RESPIRATION RATE: 18 BRPM | HEIGHT: 73 IN | WEIGHT: 282.19 LBS | DIASTOLIC BLOOD PRESSURE: 64 MMHG | SYSTOLIC BLOOD PRESSURE: 124 MMHG | HEART RATE: 87 BPM

## 2024-06-14 DIAGNOSIS — C50.021 MALIGNANT NEOPLASM INVOLVING BOTH NIPPLE AND AREOLA OF RIGHT BREAST IN MALE, ESTROGEN RECEPTOR NEGATIVE: ICD-10-CM

## 2024-06-14 DIAGNOSIS — D63.0 ANEMIA IN NEOPLASTIC DISEASE: Primary | ICD-10-CM

## 2024-06-14 DIAGNOSIS — D63.0 ANEMIA IN NEOPLASTIC DISEASE: ICD-10-CM

## 2024-06-14 DIAGNOSIS — C79.51 MALIGNANT NEOPLASM METASTATIC TO BONE: Primary | ICD-10-CM

## 2024-06-14 DIAGNOSIS — Z17.1 MALIGNANT NEOPLASM INVOLVING BOTH NIPPLE AND AREOLA OF RIGHT BREAST IN MALE, ESTROGEN RECEPTOR NEGATIVE: ICD-10-CM

## 2024-06-14 LAB
ABO + RH BLD: NORMAL
BLD GP AB SCN CELLS X3 SERPL QL: NORMAL
BLD PROD TYP BPU: NORMAL
BLOOD UNIT EXPIRATION DATE: NORMAL
BLOOD UNIT TYPE CODE: 6200
BLOOD UNIT TYPE: NORMAL
CODING SYSTEM: NORMAL
CROSSMATCH INTERPRETATION: NORMAL
DISPENSE STATUS: NORMAL
NUM UNITS TRANS PACKED RBC: NORMAL
SPECIMEN OUTDATE: NORMAL

## 2024-06-14 PROCEDURE — 36415 COLL VENOUS BLD VENIPUNCTURE: CPT

## 2024-06-14 PROCEDURE — 36430 TRANSFUSION BLD/BLD COMPNT: CPT

## 2024-06-14 PROCEDURE — 86901 BLOOD TYPING SEROLOGIC RH(D): CPT | Performed by: INTERNAL MEDICINE

## 2024-06-14 PROCEDURE — 25000003 PHARM REV CODE 250: Performed by: INTERNAL MEDICINE

## 2024-06-14 PROCEDURE — 86920 COMPATIBILITY TEST SPIN: CPT | Performed by: INTERNAL MEDICINE

## 2024-06-14 PROCEDURE — A4216 STERILE WATER/SALINE, 10 ML: HCPCS | Performed by: INTERNAL MEDICINE

## 2024-06-14 PROCEDURE — 96367 TX/PROPH/DG ADDL SEQ IV INF: CPT

## 2024-06-14 PROCEDURE — P9040 RBC LEUKOREDUCED IRRADIATED: HCPCS | Performed by: INTERNAL MEDICINE

## 2024-06-14 PROCEDURE — 96413 CHEMO IV INFUSION 1 HR: CPT

## 2024-06-14 PROCEDURE — 96375 TX/PRO/DX INJ NEW DRUG ADDON: CPT

## 2024-06-14 PROCEDURE — 63600175 PHARM REV CODE 636 W HCPCS: Performed by: INTERNAL MEDICINE

## 2024-06-14 RX ORDER — PROCHLORPERAZINE EDISYLATE 5 MG/ML
10 INJECTION INTRAMUSCULAR; INTRAVENOUS ONCE AS NEEDED
Status: DISCONTINUED | OUTPATIENT
Start: 2024-06-14 | End: 2024-06-14 | Stop reason: HOSPADM

## 2024-06-14 RX ORDER — HEPARIN 100 UNIT/ML
500 SYRINGE INTRAVENOUS
Status: DISCONTINUED | OUTPATIENT
Start: 2024-06-14 | End: 2024-06-14 | Stop reason: HOSPADM

## 2024-06-14 RX ORDER — SODIUM CHLORIDE 0.9 % (FLUSH) 0.9 %
10 SYRINGE (ML) INJECTION
Status: DISCONTINUED | OUTPATIENT
Start: 2024-06-14 | End: 2024-06-14 | Stop reason: HOSPADM

## 2024-06-14 RX ORDER — ACETAMINOPHEN 325 MG/1
650 TABLET ORAL ONCE
Status: COMPLETED | OUTPATIENT
Start: 2024-06-14 | End: 2024-06-14

## 2024-06-14 RX ORDER — DIPHENHYDRAMINE HYDROCHLORIDE 50 MG/ML
50 INJECTION INTRAMUSCULAR; INTRAVENOUS ONCE AS NEEDED
Status: DISCONTINUED | OUTPATIENT
Start: 2024-06-14 | End: 2024-06-14 | Stop reason: HOSPADM

## 2024-06-14 RX ORDER — HYDROCODONE BITARTRATE AND ACETAMINOPHEN 500; 5 MG/1; MG/1
TABLET ORAL ONCE
Status: CANCELLED | OUTPATIENT
Start: 2024-06-14 | End: 2024-06-14

## 2024-06-14 RX ORDER — DIPHENHYDRAMINE HYDROCHLORIDE 50 MG/ML
25 INJECTION INTRAMUSCULAR; INTRAVENOUS
Status: COMPLETED | OUTPATIENT
Start: 2024-06-14 | End: 2024-06-14

## 2024-06-14 RX ORDER — HYDROCODONE BITARTRATE AND ACETAMINOPHEN 500; 5 MG/1; MG/1
TABLET ORAL ONCE
Status: COMPLETED | OUTPATIENT
Start: 2024-06-14 | End: 2024-06-14

## 2024-06-14 RX ORDER — EPINEPHRINE 0.3 MG/.3ML
0.3 INJECTION SUBCUTANEOUS ONCE AS NEEDED
Status: DISCONTINUED | OUTPATIENT
Start: 2024-06-14 | End: 2024-06-14 | Stop reason: HOSPADM

## 2024-06-14 RX ADMIN — DIPHENHYDRAMINE HYDROCHLORIDE 25 MG: 50 INJECTION, SOLUTION INTRAMUSCULAR; INTRAVENOUS at 02:06

## 2024-06-14 RX ADMIN — DEXAMETHASONE SODIUM PHOSPHATE 10 MG: 4 INJECTION, SOLUTION INTRA-ARTICULAR; INTRALESIONAL; INTRAMUSCULAR; INTRAVENOUS; SOFT TISSUE at 01:06

## 2024-06-14 RX ADMIN — Medication 10 ML: at 04:06

## 2024-06-14 RX ADMIN — ACETAMINOPHEN 650 MG: 325 TABLET ORAL at 02:06

## 2024-06-14 RX ADMIN — SODIUM CHLORIDE: 9 INJECTION, SOLUTION INTRAVENOUS at 02:06

## 2024-06-14 RX ADMIN — GEMCITABINE 2104 MG: 38 INJECTION, SOLUTION INTRAVENOUS at 02:06

## 2024-06-14 RX ADMIN — SODIUM CHLORIDE: 9 INJECTION, SOLUTION INTRAVENOUS at 01:06

## 2024-06-14 RX ADMIN — HEPARIN 500 UNITS: 100 SYRINGE at 04:06

## 2024-06-14 NOTE — PLAN OF CARE
Patient tolerated gemzar and 1 unit PRBCs today. NAD noted. VSS. Patient discharged home and ambulated independently off unit with spouse by his side

## 2024-06-17 ENCOUNTER — HOSPITAL ENCOUNTER (EMERGENCY)
Facility: HOSPITAL | Age: 49
Discharge: HOME | End: 2024-06-18
Attending: EMERGENCY MEDICINE
Payer: MEDICARE

## 2024-06-17 DIAGNOSIS — R07.9 CHEST PAIN: ICD-10-CM

## 2024-06-17 DIAGNOSIS — R06.02 SOB (SHORTNESS OF BREATH): ICD-10-CM

## 2024-06-17 PROBLEM — R11.2 INTRACTABLE NAUSEA AND VOMITING: Status: ACTIVE | Noted: 2024-06-17

## 2024-06-17 LAB
ABO + RH BLD: NORMAL
ALBUMIN SERPL BCP-MCNC: 2.5 G/DL (ref 3.5–5.2)
ALLENS TEST: NORMAL
ALP SERPL-CCNC: 224 U/L (ref 55–135)
ALT SERPL W/O P-5'-P-CCNC: 175 U/L (ref 10–44)
ANION GAP SERPL CALC-SCNC: 13 MMOL/L (ref 8–16)
AST SERPL-CCNC: 217 U/L (ref 10–40)
BACTERIA #/AREA URNS AUTO: NORMAL /HPF
BASOPHILS # BLD AUTO: 0.01 K/UL (ref 0–0.2)
BASOPHILS NFR BLD: 0.2 % (ref 0–1.9)
BILIRUB SERPL-MCNC: 1.9 MG/DL (ref 0.1–1)
BILIRUB UR QL STRIP: ABNORMAL
BLD GP AB SCN CELLS X3 SERPL QL: NORMAL
BUN SERPL-MCNC: 12 MG/DL (ref 6–20)
CALCIUM SERPL-MCNC: 8.6 MG/DL (ref 8.7–10.5)
CHLORIDE SERPL-SCNC: 100 MMOL/L (ref 95–110)
CLARITY UR REFRACT.AUTO: CLEAR
CO2 SERPL-SCNC: 22 MMOL/L (ref 23–29)
COLOR UR AUTO: YELLOW
CREAT SERPL-MCNC: 0.7 MG/DL (ref 0.5–1.4)
DIFFERENTIAL METHOD BLD: ABNORMAL
EOSINOPHIL # BLD AUTO: 0 K/UL (ref 0–0.5)
EOSINOPHIL NFR BLD: 0 % (ref 0–8)
ERYTHROCYTE [DISTWIDTH] IN BLOOD BY AUTOMATED COUNT: 19 % (ref 11.5–14.5)
EST. GFR  (NO RACE VARIABLE): >60 ML/MIN/1.73 M^2
GLUCOSE SERPL-MCNC: 124 MG/DL (ref 70–110)
GLUCOSE UR QL STRIP: NEGATIVE
HCT VFR BLD AUTO: 25.6 % (ref 40–54)
HGB BLD-MCNC: 8.1 G/DL (ref 14–18)
HGB UR QL STRIP: NEGATIVE
HYALINE CASTS UR QL AUTO: 0 /LPF
IMM GRANULOCYTES # BLD AUTO: 0.07 K/UL (ref 0–0.04)
IMM GRANULOCYTES NFR BLD AUTO: 1.1 % (ref 0–0.5)
INFLUENZA A, MOLECULAR: NEGATIVE
INFLUENZA B, MOLECULAR: NEGATIVE
KETONES UR QL STRIP: ABNORMAL
LDH SERPL L TO P-CCNC: 1.3 MMOL/L (ref 0.5–2.2)
LEUKOCYTE ESTERASE UR QL STRIP: NEGATIVE
LIPASE SERPL-CCNC: 10 U/L (ref 4–60)
LYMPHOCYTES # BLD AUTO: 0.3 K/UL (ref 1–4.8)
LYMPHOCYTES NFR BLD: 5.1 % (ref 18–48)
MCH RBC QN AUTO: 22.6 PG (ref 27–31)
MCHC RBC AUTO-ENTMCNC: 31.6 G/DL (ref 32–36)
MCV RBC AUTO: 72 FL (ref 82–98)
MICROSCOPIC COMMENT: NORMAL
MONOCYTES # BLD AUTO: 0.1 K/UL (ref 0.3–1)
MONOCYTES NFR BLD: 1.4 % (ref 4–15)
NEUTROPHILS # BLD AUTO: 6.1 K/UL (ref 1.8–7.7)
NEUTROPHILS NFR BLD: 92.2 % (ref 38–73)
NITRITE UR QL STRIP: NEGATIVE
NRBC BLD-RTO: 0 /100 WBC
OHS QRS DURATION: 80 MS
OHS QTC CALCULATION: 447 MS
PH UR STRIP: 7 [PH] (ref 5–8)
PLATELET # BLD AUTO: 242 K/UL (ref 150–450)
PMV BLD AUTO: 10.4 FL (ref 9.2–12.9)
POTASSIUM SERPL-SCNC: 4.4 MMOL/L (ref 3.5–5.1)
PROT SERPL-MCNC: 7.9 G/DL (ref 6–8.4)
PROT UR QL STRIP: ABNORMAL
RBC # BLD AUTO: 3.58 M/UL (ref 4.6–6.2)
RBC #/AREA URNS AUTO: 1 /HPF (ref 0–4)
SAMPLE: NORMAL
SARS-COV-2 RDRP RESP QL NAA+PROBE: NEGATIVE
SITE: NORMAL
SODIUM SERPL-SCNC: 135 MMOL/L (ref 136–145)
SP GR UR STRIP: 1.03 (ref 1–1.03)
SPECIMEN OUTDATE: NORMAL
SPECIMEN SOURCE: NORMAL
TROPONIN I SERPL DL<=0.01 NG/ML-MCNC: <0.006 NG/ML (ref 0–0.03)
URN SPEC COLLECT METH UR: ABNORMAL
WBC # BLD AUTO: 6.64 K/UL (ref 3.9–12.7)
WBC #/AREA URNS AUTO: 1 /HPF (ref 0–5)

## 2024-06-17 PROCEDURE — 96374 THER/PROPH/DIAG INJ IV PUSH: CPT

## 2024-06-17 PROCEDURE — 85025 COMPLETE CBC W/AUTO DIFF WBC: CPT | Performed by: EMERGENCY MEDICINE

## 2024-06-17 PROCEDURE — 81001 URINALYSIS AUTO W/SCOPE: CPT | Performed by: EMERGENCY MEDICINE

## 2024-06-17 PROCEDURE — 87040 BLOOD CULTURE FOR BACTERIA: CPT | Mod: 59 | Performed by: EMERGENCY MEDICINE

## 2024-06-17 PROCEDURE — 86900 BLOOD TYPING SEROLOGIC ABO: CPT | Performed by: EMERGENCY MEDICINE

## 2024-06-17 PROCEDURE — 83605 ASSAY OF LACTIC ACID: CPT

## 2024-06-17 PROCEDURE — 93010 ELECTROCARDIOGRAM REPORT: CPT | Mod: ,,, | Performed by: INTERNAL MEDICINE

## 2024-06-17 PROCEDURE — 84484 ASSAY OF TROPONIN QUANT: CPT | Performed by: EMERGENCY MEDICINE

## 2024-06-17 PROCEDURE — 25500020 PHARM REV CODE 255: Performed by: EMERGENCY MEDICINE

## 2024-06-17 PROCEDURE — 86901 BLOOD TYPING SEROLOGIC RH(D): CPT | Performed by: EMERGENCY MEDICINE

## 2024-06-17 PROCEDURE — 25000003 PHARM REV CODE 250: Performed by: EMERGENCY MEDICINE

## 2024-06-17 PROCEDURE — 83690 ASSAY OF LIPASE: CPT | Performed by: EMERGENCY MEDICINE

## 2024-06-17 PROCEDURE — 96375 TX/PRO/DX INJ NEW DRUG ADDON: CPT

## 2024-06-17 PROCEDURE — 87502 INFLUENZA DNA AMP PROBE: CPT | Performed by: EMERGENCY MEDICINE

## 2024-06-17 PROCEDURE — 93005 ELECTROCARDIOGRAM TRACING: CPT

## 2024-06-17 PROCEDURE — 96361 HYDRATE IV INFUSION ADD-ON: CPT

## 2024-06-17 PROCEDURE — 63600175 PHARM REV CODE 636 W HCPCS: Performed by: EMERGENCY MEDICINE

## 2024-06-17 PROCEDURE — 80053 COMPREHEN METABOLIC PANEL: CPT | Performed by: EMERGENCY MEDICINE

## 2024-06-17 PROCEDURE — 99285 EMERGENCY DEPT VISIT HI MDM: CPT | Mod: 25

## 2024-06-17 PROCEDURE — U0002 COVID-19 LAB TEST NON-CDC: HCPCS | Performed by: EMERGENCY MEDICINE

## 2024-06-17 PROCEDURE — 99900035 HC TECH TIME PER 15 MIN (STAT)

## 2024-06-17 PROCEDURE — 63600175 PHARM REV CODE 636 W HCPCS

## 2024-06-17 RX ORDER — HYDROMORPHONE HYDROCHLORIDE 1 MG/ML
1 INJECTION, SOLUTION INTRAMUSCULAR; INTRAVENOUS; SUBCUTANEOUS
Status: DISCONTINUED | OUTPATIENT
Start: 2024-06-17 | End: 2024-06-17

## 2024-06-17 RX ORDER — MORPHINE SULFATE 2 MG/ML
6 INJECTION, SOLUTION INTRAMUSCULAR; INTRAVENOUS
Status: COMPLETED | OUTPATIENT
Start: 2024-06-17 | End: 2024-06-17

## 2024-06-17 RX ORDER — ONDANSETRON HYDROCHLORIDE 2 MG/ML
4 INJECTION, SOLUTION INTRAVENOUS ONCE
Status: COMPLETED | OUTPATIENT
Start: 2024-06-17 | End: 2024-06-17

## 2024-06-17 RX ADMIN — ONDANSETRON 4 MG: 2 INJECTION INTRAMUSCULAR; INTRAVENOUS at 04:06

## 2024-06-17 RX ADMIN — MORPHINE SULFATE 6 MG: 2 INJECTION, SOLUTION INTRAMUSCULAR; INTRAVENOUS at 10:06

## 2024-06-17 RX ADMIN — SODIUM CHLORIDE, POTASSIUM CHLORIDE, SODIUM LACTATE AND CALCIUM CHLORIDE 1000 ML: 600; 310; 30; 20 INJECTION, SOLUTION INTRAVENOUS at 04:06

## 2024-06-17 RX ADMIN — IOHEXOL 100 ML: 350 INJECTION, SOLUTION INTRAVENOUS at 07:06

## 2024-06-17 RX ADMIN — SODIUM CHLORIDE 1000 ML: 9 INJECTION, SOLUTION INTRAVENOUS at 10:06

## 2024-06-17 NOTE — PROVIDER PROGRESS NOTES - EMERGENCY DEPT.
Encounter Date: 6/17/2024    ED Physician Progress Notes         EKG - STEMI Decision  Initial Reading: No STEMI present.  Response: No Action Needed.

## 2024-06-17 NOTE — ED PROVIDER NOTES
Encounter Date: 6/17/2024       History     Chief Complaint   Patient presents with    Nausea    Vomiting     Pt has stage 4 breast cancer. Pt reports nausea, vomiting and sob.      20M with Breast Cancer on active chemo (last chemo 6/14/24 given with 1u pRBCs), HTN, T2DM presents for 3 days of intractable nausea/vomiting. Additionally patient has been SOB for 1 week with associated cough but has been unable to cough well due to weakness. He has never had N/V after chemo before. Has tried Zofran ODT at home without relief. Vomit has been clear/light yellow or the color of gatorade patient has tried. Additional symptoms of fatigue, generalized weakness, malaise, poor po intake, and baseline arthralgias/myalgias. He denies fevers/chills, hematemesis, sick contacts, eating under cooked meats/rice/seafood, abdominal pain, mental status changes, headache, and vision changes.     The history is provided by the patient and a relative.     Review of patient's allergies indicates:  No Known Allergies  Past Medical History:   Diagnosis Date    Breast cancer     Cancer     R breast    Diabetes mellitus     HTN (hypertension)     Leg edema, right     Prediabetes     Seizures     at age 18 - stress related    Therapy     marital counseling     Past Surgical History:   Procedure Laterality Date    AXILLARY NODE DISSECTION Right 11/22/2019    Procedure: LYMPHADENECTOMY, AXILLARY RIGHT;  Surgeon: Shima Whyte MD;  Location: [facility];  Service: General;  Laterality: Right;    AXILLARY NODE DISSECTION Right 12/17/2019    Procedure: LYMPHADENECTOMY, AXILLARY;  Surgeon: Shima Whyte MD;  Location: [address];  Service: General;  Laterality: Right;    BREAST BIOPSY      EXCISION OF HYDROCELE Right 10/28/2022    Procedure: HYDROCELECTOMY;  Surgeon: Anthony Cordero Jr., MD;  Location: [facility] OR [address] [place] [address];  Service: Urology;  Laterality: Right;  1 hour    INSERTION OF TUNNELED CENTRAL VENOUS CATHETER (CVC) WITH  SUBCUTANEOUS PORT Left 2019    Procedure: [id number];  Surgeon: Shima Whyte MD;  Location: [facility] OR 2ND FLR;  Service: General;  Laterality: Left;    INSERTION OF TUNNELED CENTRAL VENOUS CATHETER (CVC) WITH SUBCUTANEOUS PORT Left 2021    Procedure: INSERTION, SINGLE LUMEN CATHETER WITH PORT, WITH FLUOROSCOPIC GUIDANCE, right;  Surgeon: Gloria Holliday MD;  Location: [facility] OR 2ND FLR;  Service: General;  Laterality: Left;  rapid covid test    SENTINEL LYMPH NODE BIOPSY Right 2019    Procedure: BIOPSY, LYMPH NODE, SENTINEL RIGHT;  Surgeon: Shima Whyte MD;  Location: [facility];  Service: General;  Laterality: Right;    SIMPLE MASTECTOMY Right 2019    Procedure: MASTECTOMY, SIMPLE RIGHT (CONSENT AM OF) 2.5 hr case;  Surgeon: Shima Whyte MD;  Location: [facility];  Service: General;  Laterality: Right;     Family History   Problem Relation Name Age of Onset    Hypertension Mother      Diabetes Mother      Hypertension Father      Lupus Father      Post-traumatic stress disorder Brother      No Known Problems Maternal Grandmother      Colon cancer Maternal Grandfather      Breast cancer Paternal Grandmother      No Known Problems Paternal Grandfather      No Known Problems Sister      No Known Problems Maternal Aunt      No Known Problems Maternal Uncle      Fibromyalgia Paternal Aunt      Lupus Paternal Aunt      No Known Problems Paternal Uncle      No Known Problems Brother      No Known Problems Sister      No Known Problems Son      No Known Problems Son      No Known Problems Son      No Known Problems Son       Social History     Tobacco Use    Smoking status: Former     Current packs/day: 0.00     Average packs/day: 0.3 packs/day for 15.0 years (3.8 ttl pk-yrs)     Types: Cigarettes, Vaping with nicotine     Start date: 1999     Quit date: 2014     Years since quittin.5    Smokeless tobacco: Never    Tobacco comments:     Social smoker  with alcohol    Substance Use Topics    Alcohol use: Yes     Alcohol/week: 0.0 standard drinks of alcohol     Comment: Rarely    Drug use: Not Currently     Types: Marijuana     Review of Systems   Constitutional:  Positive for appetite change, diaphoresis and fatigue. Negative for chills and fever.   Eyes:  Negative for visual disturbance.   Respiratory:  Positive for cough. Negative for shortness of breath.    Cardiovascular:  Negative for chest pain and leg swelling.   Gastrointestinal:  Positive for nausea and vomiting. Negative for abdominal distention, abdominal pain, blood in stool, constipation and diarrhea.   Genitourinary:  Negative for difficulty urinating.   Musculoskeletal:  Positive for arthralgias (baseline) and myalgias (baseline).   Skin:  Negative for rash and wound.   Neurological:  Negative for headaches.       Physical Exam     Initial Vitals [06/17/24 1509]   BP Pulse Resp Temp SpO2   139/88 (!) 119 20 98.5 °F (36.9 °C) 99 %      MAP       --         Physical Exam    Constitutional: He appears well-developed and well-nourished. He is diaphoretic.   HENT:   Head: Normocephalic and atraumatic.   Mouth/Throat: Mucous membranes are dry.   Eyes: EOM are normal.   Blind in L eye, baseline   Neck:   Normal range of motion.  Cardiovascular:  Regular rhythm.   Tachycardia present.         No murmur heard.  Pulmonary/Chest: No respiratory distress. He has rhonchi.   Abdominal: Abdomen is soft. He exhibits no distension. There is no abdominal tenderness.   Musculoskeletal:      Cervical back: Normal range of motion.     Neurological: He is alert and oriented to person, place, and time.   Skin: Capillary refill takes 2 to 3 seconds.       ED Course   Procedures  Labs Reviewed   CULTURE, BLOOD   CULTURE, BLOOD   INFLUENZA A & B BY MOLECULAR   CBC W/ AUTO DIFFERENTIAL   COMPREHENSIVE METABOLIC PANEL   URINALYSIS, REFLEX TO URINE CULTURE   LIPASE   SARS-COV-2 RNA AMPLIFICATION, QUAL   TROPONIN I   TYPE &  SCREEN        ECG Results              EKG 12-lead (Final result)        Collection Time Result Time QRS Duration OHS QTC Calculation    06/17/24 15:26:57 06/17/24 16:16:21 80 447                     Final result by Interface, Lab In [facility] (06/17/24 16:16:25)                   Narrative:    Test Reason : R07.9,    Vent. Rate : 119 BPM     Atrial Rate : 119 BPM     P-R Int : 148 ms          QRS Dur : 080 ms      QT Int : 318 ms       P-R-T Axes : 058 004 052 degrees     QTc Int : 447 ms    Sinus tachycardia  Nonspecific T wave abnormality  Abnormal ECG  When compared with ECG of 15-APR-2024 11:46,  Vent. rate has increased BY  52 BPM  Confirmed by JAKE MARSHALL MD ([id number]) on 6/17/2024 4:16:19 PM    Referred By:             Confirmed By:JAKE MARSHALL MD                                  Imaging Results    None          Medications   ondansetron injection 4 mg (has no administration in time range)   lactated ringers bolus 1,000 mL (has no administration in time range)     Medical Decision Making  48M presents for intractable Nausea/Vomiting after chemotherapy. Likely side effect of chemo however would like to rule out occult infection. Tachycardic so bolused 1L LR. Trialing IV Zofran will assess response.    Signed out to oncoming resident pending all labs/imaging.    Amount and/or Complexity of Data Reviewed  Radiology: ordered.  ECG/medicine tests: ordered and independent interpretation performed.     Details: Sinus tachycardia, normal axis, mildly prolonged Qtc, nonspecific T wave abnormality, no STEMI.    Risk  Prescription drug management.              Attending Attestation:   Physician Attestation Statement for Resident:  As the supervising MD   Physician Attestation Statement: I have personally seen and examined this patient.   I agree with the above history.  - with the following exceptions: Does complain of some abdominal pain.   As the supervising MD I agree with the above PE.     As the supervising MD I  agree with the above treatment, course, plan, and disposition.   -: Pt presents with nausea and vomiting after recent chemo.  Does complain of some mild abdominal pain for me but has pretty benign abdominal exam.  IV fluids and zofran given.  Doubt intracranial cause of vomiting at this time given no headache.  Labs concerning for LFT elevation in a somewhat biliary pattern, he has had some LFT elevations before.  No significant tenderness in the RUQ and doubt cholangitis but some biliary obstructive disease is possible, did a CT abdomen to further evaluate for new biliary pathology and evaluate for other infectious or obstructive abdominal cause of vomiting.  CT showed new hepatic lesion likely from metastatic disease but no acute surgical findings.  Pt is tolerating PO intake, was frustrated with not getting pain medication for his chronic pain, morphine ordered.  Pt does not want to stay further and wants discharge with close oncology follow up. Repeat abdominal exam is benign.  Given he is now tolerating PO intake will discharge to continue home meds and follow up with oncology outpatient tomorrow.                                           Clinical Impression:  Final diagnoses:  [R06.02] SOB (shortness of breath)  [R07.9] Chest pain                 Travis Hudson MD  Resident  06/17/24 1716       Lionel Back MD  06/18/24 1219       Lionel Back MD  06/18/24 1221

## 2024-06-17 NOTE — PROVIDER PROGRESS NOTES - EMERGENCY DEPT.
Encounter Date: 6/17/2024    ED Physician Progress Notes        ED Resident HAND-OFF NOTE:  Paul Li Jr. is a 48 y.o. male who presented to the ED on 6/17/2024, patient C/O nausea and vomiting. I assumed care of patient from off-going ED physician team patient pending workup.    On my evaluation, Paul Li Jr. appears well, hemodynamically stable and in NAD. Thus far, Paul Li Jr. has received:  Medications   ondansetron injection 4 mg (4 mg Intravenous Given 6/17/24 1650)   lactated ringers bolus 1,000 mL (0 mLs Intravenous Stopped 6/17/24 1755)   iohexoL (OMNIPAQUE 350) injection 100 mL (100 mLs Intravenous Given 6/17/24 1959)   morphine injection 6 mg (6 mg Intravenous Given 6/17/24 2241)   sodium chloride 0.9% bolus 1,000 mL 1,000 mL (0 mLs Intravenous Stopped 6/18/24 0000)       BP (!) 150/92   Pulse 105   Temp 98.2 °F (36.8 °C)   Resp 17   Wt 127 kg (280 lb)   SpO2 99%   BMI 36.94 kg/m²         Disposition: I anticipate patient will be discharged  ______________________  Miguelito Cuevas MD   Emergency Medicine Resident      UPDATE:  Workup was concerning for transaminitis and elevated T bili.  CT abdomen and pelvis shows evidence of a new lesion on the liver that has consistent with the additional metastatic disease.  I have consulted Heme-Onc, but the patient has elected to go home for the evening and follow up with their oncologist in the morning regarding this finding.  I think that has a reasonable plan for this patient.  Patient was stable for discharge.  We have discharge the patient with return precautions and outpatient follow up with the Oncology team.        :  SOB (shortness of breath)  Chest pain

## 2024-06-17 NOTE — ED NOTES
Patient identifiers for Paul Li Jr. 48 y.o. male checked and correct.  Chief Complaint   Patient presents with    Nausea    Vomiting     Pt has stage 4 breast cancer. Pt reports nausea, vomiting and sob.      Past Medical History:   Diagnosis Date    Breast cancer     Cancer     R breast    Diabetes mellitus     HTN (hypertension)     Leg edema, right     Prediabetes     Seizures     at age 18 - stress related    Therapy     marital counseling     Allergies reported: Review of patient's allergies indicates:  No Known Allergies    Appearance: Pt awake, alert & oriented to person, place & time. Pt in no acute distress at present time. Pt is clean and well groomed with clothes appropriately fastened.   Skin: Skin warm, dry & intact. Color consistent with ethnicity. Mucous membranes moist. No breakdown or brusing noted.   Musculoskeletal: Patient moving all extremities well, no obvious swelling or deformities noted.   Respiratory: Respirations spontaneous, even, and non-labored. Visible chest rise noted. Airway is open and patent.chest congestion ,patient unable to cough  Neurologic: Sensation is intact. Speech is clear and appropriate. Eyes open spontaneously, behavior appropriate to situation, follows commands, facial expression symmetrical, bilateral hand grasp equal and even, purposeful motor response noted.  Cardiac: All peripheral pulses present. No Bilateral lower extremity edema. Cap refill is <3 seconds.  Abdomen: Abdomen soft, non distended, tender to palpation N&V  : Pt voids independently, denies dysuria, hematuria, frequency.

## 2024-06-17 NOTE — FIRST PROVIDER EVALUATION
Medical screening examination initiated.  I have conducted a focused provider triage encounter, findings are as follows:    Brief history of present illness:  48 M breast CA here for weakness, cold sweats, vomiting since last night.  Chemo on friday    There were no vitals filed for this visit.    Pertinent physical exam:  appears uncomfortable, sitting in wheelchair    Brief workup plan:  labs, CXR, covid    Preliminary workup initiated; this workup will be continued and followed by the physician or advanced practice provider that is assigned to the patient when roomed.

## 2024-06-17 NOTE — ED TRIAGE NOTES
Paul Li Jr., a 48 y.o. male presents to the ED w/ complaint of N&V    Triage note:  Chief Complaint   Patient presents with    Nausea    Vomiting     Pt has stage 4 breast cancer. Pt reports nausea, vomiting and sob.      Review of patient's allergies indicates:  No Known Allergies  Past Medical History:   Diagnosis Date    Breast cancer     Cancer     R breast    Diabetes mellitus     HTN (hypertension)     Leg edema, right     Prediabetes     Seizures     at age 18 - stress related    Therapy     marital counseling

## 2024-06-18 VITALS
OXYGEN SATURATION: 99 % | WEIGHT: 280 LBS | TEMPERATURE: 98 F | RESPIRATION RATE: 17 BRPM | HEART RATE: 105 BPM | DIASTOLIC BLOOD PRESSURE: 92 MMHG | SYSTOLIC BLOOD PRESSURE: 150 MMHG | BODY MASS INDEX: 36.94 KG/M2

## 2024-06-18 LAB
OHS QRS DURATION: 96 MS
OHS QTC CALCULATION: 457 MS

## 2024-06-18 NOTE — ED NOTES
I received report from KANDI Asher, and assumed care of pt at this time. Pt resting comfortably and independently repositioned in stretcher with bed locked in lowest position for safety. NAD noted at this time. Respirations even and unlabored and visible chest rise noted.  Patient offered bathroom assistance and denies need at this time. Pt instructed to call if assistance is needed. Pt on continuous cardiac, BP, and O2 monitoring. Call light within reach. No needs at this time. Will continue to monitor.  Family at bedside.

## 2024-06-18 NOTE — DISCHARGE INSTRUCTIONS
Please follow up with the your oncologist tomorrow.  Please return to the emergency department if you develop any fever, chills, shortness of breath, chest pain or any other symptoms that concern you.

## 2024-06-18 NOTE — HPI
"49 yo M with metastatic triple negative breast cancer with BRCA1 mutation (on gemcitabine, last 6/14), T2DM, HTN, presenting with intractable N/V. Patient evaluated at bedside virtually.     Additionally patient has been SOB for 1 week with associated cough but has been unable to cough well due to weakness. He has never had N/V after chemo before. Has tried Zofran ODT at home without relief. Vomit has been clear/light yellow or the color of gatorade patient has tried. Additional symptoms of fatigue, generalized weakness, malaise, poor po intake, and baseline arthralgias/myalgias. He denies fevers/chills, hematemesis, sick contacts, eating under cooked meats/rice/seafood, abdominal pain, mental status changes, headache, and vision changes.     In the ED patient was afebrile, normotensive and mildly tachycardic with SpO2 >95 on RA. Labs were significant for WBC 6.6, hgb 8.1 at baseline, PLT 242K, Na 135, K 4.1, CO2 22, BUN 12 and Cr 0.7. Lipase, lactic and troponin negative. UA with no infectious concerns but 2+ ketones. CT AP demonstrated, "Compared to NM PET-CT 05/09/2024, there is a new hepatic segment 2 hypodense hypoattenuating lesion measuring 6.7 x 4.0 cm, suggestive of progressive metastatic disease. Imaging signs of pulmonary and osseous metastatic disease similar to recent prior. Small pleural effusion on the left, minimal on the right. Mild urinary bladder wall thickening may relate to incomplete distention however correlation for UTI/cystitis is needed. Patient admitted to medical oncology for further management of N/V.         Oncology History  Malignant neoplasm involving both nipple and areola of right breast in male, estrogen receptor negative  stage IB, now metastatic  Metastatic Triple Negative Breast Cancer: progressed from prior Stage IB   5/2/2019: Right breast retroareolar core biopsy  S/p Right breast mastectomy with axillary lymph node dissection in 2019  S/p neoadjuvant ddAC/carbo-taxol, " adjuvant xeloda, XRT   Recurrence with bone metastasis in 8/2021, received abraxane and progressed on it in 3/2023  Started on Trodelvy in 5/2023 - ongoing  - His schedule of trodelvy is [id number] with growth factor support due to   neutropenia   Liquid biopsy: PIK3CA mutation  PDL 1 <1%  Precerted for Sacituzumab govitecan-hziy-for a transfer of facility  - PET CT 10/10/23:               - reviewed with previous images  - upon our review, exam is stable except subcarinal LN increase in size (max SUV of 10.9 measuring 2.4 x 2.7 cm, previously was 1.5cm with max SUV 10)  - MRI C spine from 12/15/23 reviewed, shows degenerative changes but no concerning metastatic lesions. Reports for lumbar spine and thoracic spine not uploaded, have requested from [facility]. Reviewed images, show known T6 lesion (seen on PET scan Oct 2023) but no other clear lesions.  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 NSGY following  - His most recent systemic treatment regimen was sacituzumab-govetican Q3w and he received last dose on 2/12/2024  Last PET there per notes revealed progression on 2/27/2024 and placed on gemcitabine

## 2024-06-19 ENCOUNTER — PATIENT OUTREACH (OUTPATIENT)
Dept: EMERGENCY MEDICINE | Facility: HOSPITAL | Age: 49
End: 2024-06-19
Payer: MEDICARE

## 2024-06-19 NOTE — PROGRESS NOTES
ED Navigator f/u from recent ED visit for SOB. Pt states he has gotten some relief and is not as bad as when he was seen. He is scheduled to follow up with Palliative Care on 7-3-24. He denies wanting to schedule any other other appt or concerns at this time. Pt encouraged to reach out with any concerns at they arise.

## 2024-06-22 LAB
BACTERIA BLD CULT: NORMAL
BACTERIA BLD CULT: NORMAL

## 2024-06-24 ENCOUNTER — PATIENT MESSAGE (OUTPATIENT)
Dept: HEMATOLOGY/ONCOLOGY | Facility: CLINIC | Age: 49
End: 2024-06-24
Payer: MEDICARE

## 2024-06-25 ENCOUNTER — PATIENT MESSAGE (OUTPATIENT)
Dept: HEMATOLOGY/ONCOLOGY | Facility: CLINIC | Age: 49
End: 2024-06-25
Payer: MEDICARE

## 2024-06-25 DIAGNOSIS — Z00.00 ENCOUNTER FOR MEDICARE ANNUAL WELLNESS EXAM: ICD-10-CM

## 2024-06-25 NOTE — PROGRESS NOTES
Subjective     Patient ID: Paul Li Jr. is a 48 y.o. male.    Chief Complaint: Malignant neoplasm involving both nipple and areola of righ    HPI Presents for follow up.    He and his wife are very stressed, currently living in 1 bedroom at his mother house.  They are struggling with the stress of changing life styles. They are in touch with SIDDHARTH Mcgowan.    He denies pain, notes physically he was okay.     He was in the ER last week due to dehydration. He was given fluids and antiemetics.    Appetite is good today. He is juicing and using boost.   Dizziness upon standing is better.   Sleeping is much better with xanax.      Notes the following:  He is currently on 3 BP medications and likely not needed now with the weight changes-  Discussed below:  Losartan 100 mg - hold  Amlodipine  HCTZ- every 3rd day    His BG has been better controlled  Trulicity stopped 1 month ago    No HAs since XRT to orbit  Dizziness with positional change    Other areas of pain: Currently no pain  Pain upper T spine and will appear swollen- notes if sits up too long  Lidocaine patches  Lower back pain as well    Denies dyspnea with exertion - still trouble taking a deep breath  No chest pain, palpitations    No infections  No rashes    - 5/9/2024 PET on return to [place]  Now on gemcitabine    FINDINGS:  In the head and neck, hypermetabolic focus in the posterior left orbital apex correlating with soft tissue lesion reaching max SUV of 42 (fused image 42).  There are no hypermetabolic mucosal lesions.  There are no pathologically enlarged or hypermetabolic lymph nodes.     In the chest, postop changes of right mastectomy dissection.  Decreased tracer uptake of the overlying mastectomy scar.  Overall increased number of hypermetabolic mediastinal and hilar lymph nodes.  Few scattered pulmonary nodules with minimal tracer uptake. Index lesions follows:     *1.2 cm subcarinal lymph node with SUV max 6.3 (fused image 135),  previously similar in size with SUV max 11.  *Left hilar lymph node with SUV max 13 (fused image 132) without definitive CT correlate, which is new when compared to prior imaging.  *9 mm solid nodule in the right lower lobe now demonstrating mild tracer uptake with SUV max 3.2 (fused image 142), previously measuring 9 mm with SUV max 3.2.  In the abdomen and pelvis, there is a new hypermetabolic liver lesion and left pelvic lymph nodes with index lesions as follows:     *New 7.1 x 5.6 cm hypoattenuating left hepatic lobe lesion with tracer avid uptake reaching SUV max of 21 (fused image 181).  *New 0.8 cm left external iliac node with SUV max 9.2 (fused image 276).  There is physiologic tracer distribution within the abdominal organs and excretion into the genitourinary system.     In the bones, overall mixed treatment response of multiple hypermetabolic osseous lesions throughout the axial and appendicular skeleton noting numerous cystic lesions and decreased tracer uptake within some index lesions.  Index lesions as follows:     *Left anterior 5th rib lytic lesion with SUV max 9.9 (fused image 164), previous SUV 9.4.  *T8 vertebral body with increased sclerosis and decreased tracer uptake with SUV max 3.7 (fused image 150), previous SUV 7.7.  *New tracer avid uptake of the C7 vertebral body with SUV 10 (fused image 97).  In the extremities, there are no hypermetabolic lesions worrisome for malignancy.     Additional findings:     Right maxillary mucosal retention cyst.  Left-sided chest port with tip terminating in the distal SVC.     Mild mesenteric edema with scattered non hypermetabolic mesenteric lymph nodes.  Small volume pelvic free fluid.  Mild prostatomegaly.     Impression:     Findings overall concerning for disease progression in patient with known metastatic breast cancer status post right mastectomy and right axillary lymph node dissection.     Increased number of hypermetabolic mediastinal and hilar  lymph nodes.  New hypermetabolic left pelvic lymph nodes.     New hypermetabolic hepatic mass concerning for metastasis.     Mixed osseous treatment response noting numerous new lesions and decreased uptake within some index lesions.     Hypermetabolic focus in the left orbital apex concerning for metastasis.     Additional findings as above.    Per Dr. Linn's previous note: Oncology History  Malignant neoplasm involving both nipple and areola of right breast in male, estrogen receptor negative- initially Stage IB, now metastatic  Metastatic Triple Negative Breast Cancer: progressed from prior Stage IB   5/2/2019: Right breast retroareolar core biopsy  S/p Right breast mastectomy with axillary lymph node dissection in 2019  S/p neoadjuvant ddAC/carbo-taxol, adjuvant xeloda, XRT   Recurrence with bone metastasis in 8/2021, received abraxane and progressed on it in 3/2023  Started on Trodelvy in 5/2023 - ongoing  - His schedule of trodelvy is [id number] with growth factor support due to   neutropenia   Liquid biopsy: PIK3CA mutation  PDL 1 <1%  Precerted for Sacituzumab [id number]-hziy-for a transfer of facility  - PET CT 10/10/23:               - reviewed with previous images  - upon our review, exam is stable except subcarinal LN increase in size (max SUV of 10.9 measuring 2.4 x 2.7 cm, previously was 1.5cm with max SUV 10)  - MRI C spine from 12/15/23 reviewed, shows degenerative changes but no concerning metastatic lesions. Reports for lumbar spine and thoracic spine not uploaded, have requested from [facility]. Reviewed images, show known T6 lesion (seen on PET scan Oct 2023) but no other clear lesions.      1.  Metastatic Breast Cancer:              1.  Right Breast Cancer Stage IB (pT1b pN1):  T=5 mm, G=2, N=1/15, ER=negative, HI=negative. DNP4rgp: IHC 0, FISH=.  Ki67=80%.  MammaPrint=.  Date: 5/2/2019                          A.  Histology: Invasive ductal carcinoma with lobular features                           B.  Biopsy: 5/2/2019: Right breast retroareolar core biopsy                          C.  Staging Studies:                          D.  NeoAdjuvant Chemo:                                       ddAC x 4 / TaxolCarbo wkly x 12.   5/27/2019 - 10/15/2019                          E.  Surgery:                                      11/22/2019: R Mastectomy/SLN Dr Whyte: Right breast mastectomy with SLN biopsy no LVI with 1 LN positive 4 mm, negative margins.                                      12/17/2019:  ALND  0/14 positive for carcinoma.                          F.  XRT: 2/3/2020- 3/23/2020: Total Dose: 50.4 Gy to the chest wall and regional lymphatic                          G.  Adjuvant Chemo:                                        Xeloda 1000 mg/m2 bid days 1-14 every 21 day x 6.   4/15/2020 - 9/28/20                 2.  Recurrence:  7/2/2021.                          A.  Biopsy:  Left iliac bone biopsy: 7/2/2021: Positive for metastatic breast ca                          B.  Staging:                                      MRI T/L spine 6/3/2021:  Abnormal  T12, S1, S2 vertebral bodies, LEFT sacrum and ilium concerning for metastatic disease.                                       PET 6/9/2021: Multiple sclerotic lesions in the T12 vertebral body, sacrum, and left iliac bone with hypermetabolic activity concerning for active metastatic disease.                                      C.  Treatment:                                      Abraxane:  8/13/2021 - 3/25/2023.     2.  Genetic Testing:  Genetic testing: AXIN2, RNF43 (VUS) identified     TREATMENT PLAN:                 SACITUZUMAB q 2wk with Neulasta support:                            5/19/2023 - 7/14/2023.                            Resumed 8/30/2023 - .                 Zometa q 4 wkly a:  8/2022 - .                  Liquid biopsy:                            PIK3CA mutation                          PDL-1 <1%.     Restaging:   PET: 2/15/2022: stable osseous  "sclerotic lesions and pulmonary micro nodules  PET 6/6/2022: stable   PET 11/4/2022:stable  PET 3/20/2023: New derick metastasis (right hilar node). Otherwise stable  MRI sacrum/coccyx 3/29/2023: Extensive osseous metastatic disease of the pelvis involving much of the sacrum.  No evidence for invasion of the sacral canal or neural foramina.  MRI L-spine 3/29/2023: Dffuse osseous metastatic disease throughout the lumbar spine.  PET 6/22/2023: Findings suggestive of disease progression, noting worsening osseous metastatic disease and subcarinal/right hilar lymphadenopathy.  PET 10/10/2023:  Stable except subcarinal LN increase per PET.  CT images are not convincing.     Moved to [place] 6/2023  Moved  back from [place] 2/2024- see summary below.            Diagnosis: Metastatic triple negative male breast cancer, BRCA1 +, progression  His most recent systemic treatment regimen was sacituzumab-govetican Q3w and he received last dose on 2/12/2024  Last PET there per notes revealed progression on 2/27/2024 and no systemic treatment since     Patient moved to [place] in June of 2023 and recently returned to [place] to re-establish care after sudden left eye blindness- he woke up that AM with acute of chronic vision loss, left orbital pain and exophthalmos  He was admitted on return from 3/20- 3/22/2024   In the ED a stroke code was activated and CTA demonstrated "Irregularity of the intracranial vertebral arteries and left PCA as above, likely atherosclerotic disease.  No high-grade stenosis or large vessel occlusion. Osseous metastatic disease similar to prior outside MRI." Opthalmology was consulted who recommended a orbital MRI which demonstrated " There is abnormal soft tissue lesion in the posterior left orbital apex/optic canal which is adjacent to or involving the inferior rectus and medial rectus extra-ocular muscles with associated enhancement, and could represent metastatic disease.  This abuts and " "compresses the posterior optic nerve at the posterior left orbital apex. There is minimal thickening and increased T2 signal with probable minimal enhancement of the anterior left optic nerve that could represent mild optic neuritis."      He has now completed XRT       Most recent imaging:  - 2/27/2024 PET- not visible in records and will obtain     - 3/20/2024 MRI orbits:  FINDINGS:  Orbits/Optic Nerves:  Minimal thickening and increased T2 signal with probable minimal enhancement of the left optic nerve could represent mild optic neuritis.  No significant abnormality on the right.  Globes appear within normal limits.  Remainder of the Intracranial Compartment:  No midline shift or hydrocephalus.  No extra-axial blood or fluid collections.  The brain parenchyma appears normal. No cerebral mass lesion, acute hemorrhage, edema, or acute infarct.  There is abnormal soft tissue lesion in the posterior left orbital apex and optic canal, which is adjacent to or involving the inferior rectus and medial rectus extra-ocular muscles.  There is mild associated enhancement.  This could represent metastatic disease.  Minimal associated restricted diffusion on axial 35 of series 6.  This abuts and may compress the posterior optic nerve at the posterior left orbital apex.  Normal vascular flow voids are preserved.  Mild bilateral ethmoid and right maxillary sinus disease.  Skull/Extracranial Contents (limited evaluation): Bone marrow signal intensity is normal.  Impression:  1. There is abnormal soft tissue lesion in the posterior left orbital apex/optic canal which is adjacent to or involving the inferior rectus and medial rectus extra-ocular muscles with associated enhancement, and could represent metastatic disease.  This abuts and compresses the posterior optic nerve at the posterior left orbital apex.  2. There is minimal thickening and increased T2 signal with probable minimal enhancement of the anterior left optic nerve " that could represent mild optic neuritis.  Follow-up recommended.  3. Mild paranasal sinus disease.  4. This report was flagged in Epic as abnormal.     Review of Systems   Constitutional:  Positive for appetite change (improved) and fatigue. Negative for activity change, chills, fever and unexpected weight change (stabilized).   Eyes:  Positive for visual disturbance.   Respiratory:  Positive for shortness of breath (improved). Negative for cough and wheezing.    Cardiovascular:  Negative for chest pain, palpitations and leg swelling.   Gastrointestinal:  Negative for abdominal distention, abdominal pain, change in bowel habit, constipation, diarrhea, nausea, vomiting and reflux.   Genitourinary:  Negative for bladder incontinence, decreased urine volume, difficulty urinating, frequency, hematuria and urgency.   Musculoskeletal:  Positive for arthralgias and back pain (stable). Negative for leg pain and neck pain.   Neurological:  Negative for dizziness, weakness (better), numbness, headaches, memory loss and coordination difficulties.   Hematological:  Negative for adenopathy. Does not bruise/bleed easily.   Psychiatric/Behavioral:  Positive for sleep disturbance. Negative for dysphoric mood. The patient is nervous/anxious.           Objective     Physical Exam  Vitals and nursing note reviewed.   Constitutional:       General: He is not in acute distress.     Appearance: Normal appearance. He is not ill-appearing.   HENT:      Head: Normocephalic and atraumatic.      Mouth/Throat:      Pharynx: No oropharyngeal exudate or posterior oropharyngeal erythema.      Comments: Poor dentition  Eyes:      General: No scleral icterus.     Comments: Left eye visual change   Cardiovascular:      Rate and Rhythm: Normal rate and regular rhythm.      Heart sounds: No murmur heard.     No friction rub. No gallop.   Pulmonary:      Effort: Pulmonary effort is normal. No respiratory distress.      Breath sounds: Normal breath  sounds. No wheezing, rhonchi or rales.   Abdominal:      General: Abdomen is flat. Bowel sounds are normal. There is no distension.      Palpations: Abdomen is soft. There is no mass.      Tenderness: There is no guarding.   Musculoskeletal:         General: Swelling (lymphedema better RUE) present. No tenderness. Normal range of motion.      Cervical back: Normal range of motion and neck supple.      Right lower leg: No edema.      Left lower leg: No edema.   Skin:     General: Skin is warm and dry.      Coloration: Skin is not jaundiced or pale.   Neurological:      Mental Status: He is alert and oriented to person, place, and time.      Sensory: Sensory deficit present.      Motor: No weakness.      Gait: Gait normal.      Comments: Decr strength bilateral LE   Psychiatric:         Mood and Affect: Mood normal.         Behavior: Behavior normal.         Thought Content: Thought content normal.         Judgment: Judgment normal.            Assessment and Plan     1. Malignant neoplasm involving both nipple and areola of right breast in male, estrogen receptor negative  Overview:  1.  Metastatic Breast Cancer:   1.  Right Breast Cancer Stage IB (pT1b pN1):  T=5 mm, G=2, N=1/15, ER=negative, MI=negative. AGX2awb: IHC 0, FISH=.  Ki67=80%.  MammaPrint=.  Date: 5/2/2019    A.  Histology: Invasive ductal carcinoma with lobular features    B.  Biopsy: 5/2/2019: Right breast retroareolar core biopsy    C.  Staging Studies:    D.  NeoAdjuvant Chemo:      ddAC x 4 / TaxolCarbo wkly x 12.   5/27/2019 - 10/15/2019    E.  Surgery:     11/22/2019: R Mastectomy/SLN Dr Whtye: Right breast mastectomy with SLN biopsy no LVI with 1 LN positive 4 mm, negative margins.     12/17/2019:  ALND  0/14 positive for carcinoma.    F.  XRT: 2/3/2020- 3/23/2020: Total Dose: 50.4 Gy to the chest wall and regional lymphatic    G.  Adjuvant Chemo:       Xeloda 1000 mg/m2 bid days 1-14 every 21 day x 6.   4/15/2020 - 9/28/20     2.  Recurrence:   7/2/2021.    A.  Biopsy:  Left iliac bone biopsy: 7/2/2021: Positive for metastatic breast ca    B.  Staging:     MRI T/L spine 6/3/2021:  Abnormal  T12, S1, S2 vertebral bodies, LEFT sacrum and ilium concerning for metastatic disease.      PET 6/9/2021: Multiple sclerotic lesions in the T12 vertebral body, sacrum, and left iliac bone with hypermetabolic activity concerning for active metastatic disease.        C.  Treatment:     Abraxane:  8/13/2021 - 3/25/2023.     SACITUZUMAB q 2wk with Neulasta support:        5/19/2023 - 7/14/2023.        Resumed 8/30/2023 - 2/14/2024.  Progression following stable disease.    2.  Genetic Testing:  Genetic testing: AXIN2, RNF43 (VUS) identified.    3.  Vision loss L eye 2/28/2024.    TREATMENT PLAN:     Vision loss OS:  Consult Dr. Colbert 2/28/2024.    MRI Brain and orbits ordered 2/28/2024.     Faslodex pending   Abemaciclib/ribociclib pending    Discontinue trazodone with ribociclib.    Zometa q 4 wk:  8/2022 - .      Liquid biopsy:      PIK3CA mutation.  Pt receive Apelisib in the past.    PDL-1 <1%.    Restaging:   PET: 2/15/2022: stable osseous sclerotic lesions and pulmonary micro nodules  PET 6/6/2022: stable   PET 11/4/2022:stable  PET 3/20/2023: New derick metastasis (right hilar node). Otherwise stable  MRI sacrum/coccyx 3/29/2023: Extensive osseous metastatic disease of the pelvis involving much of the sacrum.  No evidence for invasion of the sacral canal or neural foramina.  MRI L-spine 3/29/2023: Dffuse osseous metastatic disease throughout the lumbar spine.  PET 6/22/2023: Findings suggestive of disease progression, noting worsening osseous metastatic disease and subcarinal/right hilar lymphadenopathy.  PET 10/10/2023:  Stable except subcarinal LN increase per PET.  CT images are not convincing.  PET 2/27/2024:  Progression in intra-thoracic LN.        2. Secondary liver cancer    3. Secondary malignant neoplasm of brain    4. Neoplasm related pain    5.  Microcytic anemia  Overview:  - Microcytic anemia present since atleast 2017 without significant change.  - Will test with hemoglobin electrophoresis in future.      6. Chemotherapy-induced neutropenia    7. Bone metastases  Overview:  IMO Regualtory Update 4/1/23      8. Uncontrolled type 2 diabetes mellitus with hyperglycemia    Labs reviewed    Metastatic breast cancer BRCA +  Heavily pretreated  On gemcitabine - proceed with Cycle 2  Will have close scan follow up   We discussed etiology  Restart with dose reduction and monitor closely    DM  Improved parameters   Trulicity held    HTN  See BP adjustments made above  Monitor closely      SW assistance  Food pantry    Anemia  Hemoglobin 7.1 - 1 unite of PRBCs tomorrow with gemzar    Return to clinic in 1 week with MD appointment and labs.     Patient is in agreement with the proposed treatment plan. All questions were answered to the patient's satisfaction. Patient knows to call clinic for any new or worsening symptoms and if anything is needed before the next clinic visit.          Michelle Grayson, FNP-C  Hematology & Medical Oncology   [address] [place] [id number]  ph. [phone]  Fax. [phone]    Collaborating physician, Dr. Linn.    Approximately 20 minutes were spent face-to-face with the patient.  Approximately 30 minutes in total were spent on this encounter, which includes face-to-face time and non-face-to-face time preparing to see the patient (e.g., review of tests), obtaining and/or reviewing separately obtained history, documenting clinical information in the electronic or other health record, independently interpreting results (not separately reported) and communicating results to the patient/family/caregiver, or care coordination (not separately reported).       Route Chart for Scheduling    Med Onc Chart Routing  Urgent    Follow up with physician 1 week. Dr. Linn in 1-2 weeks   Follow up with JAC    Infusion scheduling  note   needs gemzar next week   Injection scheduling note    Labs CBC and CMP   Scheduling:  Preferred lab:  Lab interval:  weekly labs   Imaging    Pharmacy appointment    Other referrals                Treatment Plan Information   OP BREAST GEMCITABINE QW   Rosa Linn MD   Upcoming Treatment Dates - OP BREAST GEMCITABINE QW    6/28/2024       Pre-Medications       dexAMETHasone (DECADRON) 10 mg in sodium chloride 0.9% 50 mL IVPB       Chemotherapy       gemcitabine (GEMZAR) 2,104 mg in sodium chloride 0.9% SolP 250 mL chemo infusion  7/5/2024       Pre-Medications       dexAMETHasone (DECADRON) 10 mg in sodium chloride 0.9% 50 mL IVPB       Chemotherapy       gemcitabine (GEMZAR) 2,104 mg in sodium chloride 0.9% SolP 250 mL chemo infusion  7/12/2024       Pre-Medications       dexAMETHasone (DECADRON) 10 mg in sodium chloride 0.9% 50 mL IVPB       Chemotherapy       gemcitabine (GEMZAR) 2,104 mg in sodium chloride 0.9% SolP 250 mL chemo infusion  7/26/2024       Pre-Medications       dexAMETHasone (DECADRON) 10 mg in sodium chloride 0.9% 50 mL IVPB       Chemotherapy       gemcitabine (GEMZAR) 2,104 mg in sodium chloride 0.9% SolP 250 mL chemo infusion    Supportive Plan Information  OP FILGRASTIM 480 MCG   Michelle Grayson NP   Upcoming Treatment Dates - OP FILGRASTIM 480 MCG    No upcoming days in selected categories.    Therapy Plan Information  PORT FLUSH  Flushes  heparin, porcine (PF) 100 unit/mL injection flush 500 Units  500 Units, Intravenous, Every visit  sodium chloride 0.9% flush 10 mL  10 mL, Intravenous, Every visit    ZOLEDRONIC ACID (ZOMETA) IV  Medications  zoledronic acid (ZOMETA) 4 mg in sodium chloride 0.9% 100 mL IVPB  4 mg, Intravenous, Every 4 weeks  Flushes  sodium chloride 0.9% 250 mL flush bag  Intravenous, Every 4 weeks  sodium chloride 0.9% flush 10 mL  10 mL, Intravenous, Every 4 weeks  heparin, porcine (PF) 100 unit/mL injection flush 500 Units  500 Units, Intravenous, Every 4  weeks  alteplase injection 2 mg  2 mg, Intra-Catheter, Every 4 weeks

## 2024-06-26 DIAGNOSIS — E11.65 UNCONTROLLED TYPE 2 DIABETES MELLITUS WITH HYPERGLYCEMIA: ICD-10-CM

## 2024-06-26 DIAGNOSIS — F32.0 CURRENT MILD EPISODE OF MAJOR DEPRESSIVE DISORDER WITHOUT PRIOR EPISODE: ICD-10-CM

## 2024-06-26 RX ORDER — FLUOXETINE HYDROCHLORIDE 40 MG/1
80 CAPSULE ORAL DAILY
Qty: 60 CAPSULE | Refills: 0 | Status: SHIPPED | OUTPATIENT
Start: 2024-06-26 | End: 2024-08-25

## 2024-06-26 RX ORDER — METFORMIN HYDROCHLORIDE 500 MG/1
1000 TABLET ORAL 2 TIMES DAILY WITH MEALS
Qty: 120 TABLET | Refills: 2 | Status: SHIPPED | OUTPATIENT
Start: 2024-06-26 | End: 2024-09-24

## 2024-06-27 ENCOUNTER — LAB VISIT (OUTPATIENT)
Dept: LAB | Facility: HOSPITAL | Age: 49
End: 2024-06-27
Payer: MEDICARE

## 2024-06-27 ENCOUNTER — OFFICE VISIT (OUTPATIENT)
Dept: HEMATOLOGY/ONCOLOGY | Facility: CLINIC | Age: 49
End: 2024-06-27
Payer: MEDICARE

## 2024-06-27 VITALS
WEIGHT: 280 LBS | TEMPERATURE: 98 F | SYSTOLIC BLOOD PRESSURE: 125 MMHG | BODY MASS INDEX: 37.11 KG/M2 | DIASTOLIC BLOOD PRESSURE: 70 MMHG | HEART RATE: 95 BPM | RESPIRATION RATE: 20 BRPM | HEIGHT: 73 IN | OXYGEN SATURATION: 94 %

## 2024-06-27 DIAGNOSIS — C79.31 SECONDARY MALIGNANT NEOPLASM OF BRAIN: ICD-10-CM

## 2024-06-27 DIAGNOSIS — C50.021 MALIGNANT NEOPLASM INVOLVING BOTH NIPPLE AND AREOLA OF RIGHT BREAST IN MALE, ESTROGEN RECEPTOR NEGATIVE: ICD-10-CM

## 2024-06-27 DIAGNOSIS — D50.9 MICROCYTIC ANEMIA: ICD-10-CM

## 2024-06-27 DIAGNOSIS — Z17.1 MALIGNANT NEOPLASM INVOLVING BOTH NIPPLE AND AREOLA OF RIGHT BREAST IN MALE, ESTROGEN RECEPTOR NEGATIVE: Primary | ICD-10-CM

## 2024-06-27 DIAGNOSIS — D50.9 MICROCYTIC ANEMIA: Primary | ICD-10-CM

## 2024-06-27 DIAGNOSIS — Z17.1 MALIGNANT NEOPLASM INVOLVING BOTH NIPPLE AND AREOLA OF RIGHT BREAST IN MALE, ESTROGEN RECEPTOR NEGATIVE: ICD-10-CM

## 2024-06-27 DIAGNOSIS — C50.021 MALIGNANT NEOPLASM INVOLVING BOTH NIPPLE AND AREOLA OF RIGHT BREAST IN MALE, ESTROGEN RECEPTOR NEGATIVE: Primary | ICD-10-CM

## 2024-06-27 DIAGNOSIS — E11.65 UNCONTROLLED TYPE 2 DIABETES MELLITUS WITH HYPERGLYCEMIA: ICD-10-CM

## 2024-06-27 DIAGNOSIS — D70.1 CHEMOTHERAPY-INDUCED NEUTROPENIA: ICD-10-CM

## 2024-06-27 DIAGNOSIS — T45.1X5A CHEMOTHERAPY-INDUCED NEUTROPENIA: ICD-10-CM

## 2024-06-27 DIAGNOSIS — C78.7 SECONDARY LIVER CANCER: ICD-10-CM

## 2024-06-27 DIAGNOSIS — C79.51 MALIGNANT NEOPLASM METASTATIC TO BONE: ICD-10-CM

## 2024-06-27 DIAGNOSIS — G89.3 NEOPLASM RELATED PAIN: ICD-10-CM

## 2024-06-27 LAB
ABO + RH BLD: ABNORMAL
BLD GP AB SCN CELLS X3 SERPL QL: ABNORMAL
BLOOD GROUP ANTIBODIES SERPL: NORMAL
SPECIMEN OUTDATE: ABNORMAL

## 2024-06-27 PROCEDURE — 1160F RVW MEDS BY RX/DR IN RCRD: CPT | Mod: CPTII,S$GLB,, | Performed by: NURSE PRACTITIONER

## 2024-06-27 PROCEDURE — 99215 OFFICE O/P EST HI 40 MIN: CPT | Mod: S$GLB,,, | Performed by: NURSE PRACTITIONER

## 2024-06-27 PROCEDURE — 86870 RBC ANTIBODY IDENTIFICATION: CPT | Performed by: NURSE PRACTITIONER

## 2024-06-27 PROCEDURE — 3078F DIAST BP <80 MM HG: CPT | Mod: CPTII,S$GLB,, | Performed by: NURSE PRACTITIONER

## 2024-06-27 PROCEDURE — 36415 COLL VENOUS BLD VENIPUNCTURE: CPT | Performed by: NURSE PRACTITIONER

## 2024-06-27 PROCEDURE — G2211 COMPLEX E/M VISIT ADD ON: HCPCS | Mod: S$GLB,,, | Performed by: NURSE PRACTITIONER

## 2024-06-27 PROCEDURE — 1159F MED LIST DOCD IN RCRD: CPT | Mod: CPTII,S$GLB,, | Performed by: NURSE PRACTITIONER

## 2024-06-27 PROCEDURE — 86900 BLOOD TYPING SEROLOGIC ABO: CPT | Performed by: NURSE PRACTITIONER

## 2024-06-27 PROCEDURE — 99999 PR PBB SHADOW E&M-EST. PATIENT-LVL V: CPT | Mod: PBBFAC,,, | Performed by: NURSE PRACTITIONER

## 2024-06-27 PROCEDURE — 3074F SYST BP LT 130 MM HG: CPT | Mod: CPTII,S$GLB,, | Performed by: NURSE PRACTITIONER

## 2024-06-27 PROCEDURE — 3008F BODY MASS INDEX DOCD: CPT | Mod: CPTII,S$GLB,, | Performed by: NURSE PRACTITIONER

## 2024-06-27 PROCEDURE — 4010F ACE/ARB THERAPY RXD/TAKEN: CPT | Mod: CPTII,S$GLB,, | Performed by: NURSE PRACTITIONER

## 2024-06-27 PROCEDURE — 86850 RBC ANTIBODY SCREEN: CPT | Performed by: NURSE PRACTITIONER

## 2024-06-27 RX ORDER — DIPHENHYDRAMINE HYDROCHLORIDE 50 MG/ML
50 INJECTION INTRAMUSCULAR; INTRAVENOUS ONCE AS NEEDED
OUTPATIENT
Start: 2024-07-05

## 2024-06-27 RX ORDER — HEPARIN 100 UNIT/ML
500 SYRINGE INTRAVENOUS
Status: CANCELLED | OUTPATIENT
Start: 2024-06-28

## 2024-06-27 RX ORDER — SODIUM CHLORIDE 0.9 % (FLUSH) 0.9 %
10 SYRINGE (ML) INJECTION
OUTPATIENT
Start: 2024-07-05

## 2024-06-27 RX ORDER — HEPARIN 100 UNIT/ML
500 SYRINGE INTRAVENOUS
OUTPATIENT
Start: 2024-07-05

## 2024-06-27 RX ORDER — DIPHENHYDRAMINE HCL 25 MG
25 CAPSULE ORAL
Status: CANCELLED | OUTPATIENT
Start: 2024-06-27

## 2024-06-27 RX ORDER — SODIUM CHLORIDE 0.9 % (FLUSH) 0.9 %
10 SYRINGE (ML) INJECTION
Status: CANCELLED | OUTPATIENT
Start: 2024-06-28

## 2024-06-27 RX ORDER — DIPHENHYDRAMINE HYDROCHLORIDE 50 MG/ML
50 INJECTION INTRAMUSCULAR; INTRAVENOUS ONCE AS NEEDED
Status: CANCELLED | OUTPATIENT
Start: 2024-06-28

## 2024-06-27 RX ORDER — HEPARIN 100 UNIT/ML
500 SYRINGE INTRAVENOUS
OUTPATIENT
Start: 2024-07-12

## 2024-06-27 RX ORDER — PROCHLORPERAZINE EDISYLATE 5 MG/ML
10 INJECTION INTRAMUSCULAR; INTRAVENOUS ONCE AS NEEDED
Status: CANCELLED
Start: 2024-06-28

## 2024-06-27 RX ORDER — EPINEPHRINE 0.3 MG/.3ML
0.3 INJECTION SUBCUTANEOUS ONCE AS NEEDED
OUTPATIENT
Start: 2024-07-12

## 2024-06-27 RX ORDER — PROCHLORPERAZINE EDISYLATE 5 MG/ML
10 INJECTION INTRAMUSCULAR; INTRAVENOUS ONCE AS NEEDED
Start: 2024-07-05

## 2024-06-27 RX ORDER — EPINEPHRINE 0.3 MG/.3ML
0.3 INJECTION SUBCUTANEOUS ONCE AS NEEDED
Status: CANCELLED | OUTPATIENT
Start: 2024-06-28

## 2024-06-27 RX ORDER — PROCHLORPERAZINE EDISYLATE 5 MG/ML
10 INJECTION INTRAMUSCULAR; INTRAVENOUS ONCE AS NEEDED
Start: 2024-07-12

## 2024-06-27 RX ORDER — SODIUM CHLORIDE 0.9 % (FLUSH) 0.9 %
10 SYRINGE (ML) INJECTION
OUTPATIENT
Start: 2024-07-12

## 2024-06-27 RX ORDER — HYDROCODONE BITARTRATE AND ACETAMINOPHEN 500; 5 MG/1; MG/1
TABLET ORAL ONCE
Status: CANCELLED | OUTPATIENT
Start: 2024-06-27 | End: 2024-06-27

## 2024-06-27 RX ORDER — ACETAMINOPHEN 325 MG/1
650 TABLET ORAL
Status: CANCELLED | OUTPATIENT
Start: 2024-06-27

## 2024-06-27 RX ORDER — EPINEPHRINE 0.3 MG/.3ML
0.3 INJECTION SUBCUTANEOUS ONCE AS NEEDED
OUTPATIENT
Start: 2024-07-05

## 2024-06-27 RX ORDER — DIPHENHYDRAMINE HYDROCHLORIDE 50 MG/ML
50 INJECTION INTRAMUSCULAR; INTRAVENOUS ONCE AS NEEDED
OUTPATIENT
Start: 2024-07-12

## 2024-06-28 ENCOUNTER — LAB VISIT (OUTPATIENT)
Dept: LAB | Facility: OTHER | Age: 49
End: 2024-06-28
Attending: FAMILY MEDICINE
Payer: MEDICARE

## 2024-06-28 ENCOUNTER — INFUSION (OUTPATIENT)
Dept: INFUSION THERAPY | Facility: OTHER | Age: 49
End: 2024-06-28
Payer: MEDICARE

## 2024-06-28 VITALS
RESPIRATION RATE: 16 BRPM | WEIGHT: 280 LBS | OXYGEN SATURATION: 99 % | DIASTOLIC BLOOD PRESSURE: 72 MMHG | BODY MASS INDEX: 37.11 KG/M2 | SYSTOLIC BLOOD PRESSURE: 131 MMHG | HEIGHT: 73 IN | HEART RATE: 89 BPM | TEMPERATURE: 98 F

## 2024-06-28 DIAGNOSIS — C79.51 MALIGNANT NEOPLASM METASTATIC TO BONE: Primary | ICD-10-CM

## 2024-06-28 DIAGNOSIS — D50.9 MICROCYTIC ANEMIA: ICD-10-CM

## 2024-06-28 DIAGNOSIS — Z17.1 MALIGNANT NEOPLASM INVOLVING BOTH NIPPLE AND AREOLA OF RIGHT BREAST IN MALE, ESTROGEN RECEPTOR NEGATIVE: ICD-10-CM

## 2024-06-28 DIAGNOSIS — C50.021 MALIGNANT NEOPLASM INVOLVING BOTH NIPPLE AND AREOLA OF RIGHT BREAST IN MALE, ESTROGEN RECEPTOR NEGATIVE: ICD-10-CM

## 2024-06-28 LAB
ABO + RH BLD: ABNORMAL
BLD GP AB SCN CELLS X3 SERPL QL: ABNORMAL
BLD PROD TYP BPU: NORMAL
BLOOD UNIT EXPIRATION DATE: NORMAL
BLOOD UNIT TYPE CODE: 6200
BLOOD UNIT TYPE: NORMAL
CODING SYSTEM: NORMAL
CROSSMATCH INTERPRETATION: NORMAL
DISPENSE STATUS: NORMAL
NUM UNITS TRANS PACKED RBC: NORMAL
SPECIMEN OUTDATE: ABNORMAL

## 2024-06-28 PROCEDURE — 25000003 PHARM REV CODE 250: Performed by: NURSE PRACTITIONER

## 2024-06-28 PROCEDURE — 86850 RBC ANTIBODY SCREEN: CPT | Performed by: NURSE PRACTITIONER

## 2024-06-28 PROCEDURE — 63600175 PHARM REV CODE 636 W HCPCS: Performed by: INTERNAL MEDICINE

## 2024-06-28 PROCEDURE — P9040 RBC LEUKOREDUCED IRRADIATED: HCPCS | Performed by: NURSE PRACTITIONER

## 2024-06-28 PROCEDURE — 96361 HYDRATE IV INFUSION ADD-ON: CPT

## 2024-06-28 PROCEDURE — 36430 TRANSFUSION BLD/BLD COMPNT: CPT

## 2024-06-28 PROCEDURE — 86922 COMPATIBILITY TEST ANTIGLOB: CPT | Performed by: NURSE PRACTITIONER

## 2024-06-28 PROCEDURE — 96413 CHEMO IV INFUSION 1 HR: CPT

## 2024-06-28 PROCEDURE — 96367 TX/PROPH/DG ADDL SEQ IV INF: CPT

## 2024-06-28 PROCEDURE — 86901 BLOOD TYPING SEROLOGIC RH(D): CPT | Performed by: NURSE PRACTITIONER

## 2024-06-28 PROCEDURE — 86900 BLOOD TYPING SEROLOGIC ABO: CPT | Performed by: NURSE PRACTITIONER

## 2024-06-28 PROCEDURE — 25000003 PHARM REV CODE 250: Performed by: INTERNAL MEDICINE

## 2024-06-28 PROCEDURE — 36415 COLL VENOUS BLD VENIPUNCTURE: CPT | Performed by: NURSE PRACTITIONER

## 2024-06-28 RX ORDER — ACETAMINOPHEN 325 MG/1
650 TABLET ORAL
Status: COMPLETED | OUTPATIENT
Start: 2024-06-28 | End: 2024-06-28

## 2024-06-28 RX ORDER — DIPHENHYDRAMINE HCL 25 MG
25 CAPSULE ORAL
Status: COMPLETED | OUTPATIENT
Start: 2024-06-28 | End: 2024-06-28

## 2024-06-28 RX ORDER — EPINEPHRINE 0.3 MG/.3ML
0.3 INJECTION SUBCUTANEOUS ONCE AS NEEDED
Status: DISCONTINUED | OUTPATIENT
Start: 2024-06-28 | End: 2024-06-28 | Stop reason: HOSPADM

## 2024-06-28 RX ORDER — DIPHENHYDRAMINE HYDROCHLORIDE 50 MG/ML
50 INJECTION INTRAMUSCULAR; INTRAVENOUS ONCE AS NEEDED
Status: DISCONTINUED | OUTPATIENT
Start: 2024-06-28 | End: 2024-06-28 | Stop reason: HOSPADM

## 2024-06-28 RX ORDER — HEPARIN 100 UNIT/ML
500 SYRINGE INTRAVENOUS
Status: DISCONTINUED | OUTPATIENT
Start: 2024-06-28 | End: 2024-06-28 | Stop reason: HOSPADM

## 2024-06-28 RX ORDER — SODIUM CHLORIDE 0.9 % (FLUSH) 0.9 %
10 SYRINGE (ML) INJECTION
Status: DISCONTINUED | OUTPATIENT
Start: 2024-06-28 | End: 2024-06-28 | Stop reason: HOSPADM

## 2024-06-28 RX ORDER — PROCHLORPERAZINE EDISYLATE 5 MG/ML
10 INJECTION INTRAMUSCULAR; INTRAVENOUS ONCE AS NEEDED
Status: DISCONTINUED | OUTPATIENT
Start: 2024-06-28 | End: 2024-06-28 | Stop reason: HOSPADM

## 2024-06-28 RX ORDER — HYDROCODONE BITARTRATE AND ACETAMINOPHEN 500; 5 MG/1; MG/1
TABLET ORAL ONCE
Status: COMPLETED | OUTPATIENT
Start: 2024-06-28 | End: 2024-06-28

## 2024-06-28 RX ADMIN — DEXAMETHASONE SODIUM PHOSPHATE 10 MG: 4 INJECTION, SOLUTION INTRA-ARTICULAR; INTRALESIONAL; INTRAMUSCULAR; INTRAVENOUS; SOFT TISSUE at 11:06

## 2024-06-28 RX ADMIN — SODIUM CHLORIDE 1000 ML: 9 INJECTION, SOLUTION INTRAVENOUS at 10:06

## 2024-06-28 RX ADMIN — HEPARIN 500 UNITS: 100 SYRINGE at 02:06

## 2024-06-28 RX ADMIN — DIPHENHYDRAMINE HYDROCHLORIDE 25 MG: 25 CAPSULE ORAL at 12:06

## 2024-06-28 RX ADMIN — SODIUM CHLORIDE: 9 INJECTION, SOLUTION INTRAVENOUS at 12:06

## 2024-06-28 RX ADMIN — ACETAMINOPHEN 650 MG: 325 TABLET ORAL at 12:06

## 2024-06-28 RX ADMIN — GEMCITABINE 2000 MG: 100 INJECTION, SOLUTION INTRAVENOUS at 11:06

## 2024-06-28 NOTE — PLAN OF CARE
1 unit PRBC transfusion, 1 liter IVF, Gemzar infusion complete. Pt tolerated well. VSS. NAD. Port to chest de-accessed after heparinized per protocol. Pt verbalized understanding of discharge instructions before leaving.

## 2024-07-02 ENCOUNTER — PATIENT OUTREACH (OUTPATIENT)
Dept: EMERGENCY MEDICINE | Facility: HOSPITAL | Age: 49
End: 2024-07-02
Payer: MEDICARE

## 2024-07-02 ENCOUNTER — TELEPHONE (OUTPATIENT)
Dept: HEMATOLOGY/ONCOLOGY | Facility: CLINIC | Age: 49
End: 2024-07-02
Payer: MEDICARE

## 2024-07-02 NOTE — PROGRESS NOTES
Pt denies having any concerns at this time. ED navigator will follow-up with patient to assist as needed. Pt encouraged to reach out with any concerns at they arise.

## 2024-07-02 NOTE — TELEPHONE ENCOUNTER
----- Message from Linda Donovan MA sent at 7/2/2024  4:09 PM CDT -----  yes  ----- Message -----  From: Shaniqua Moran  Sent: 7/2/2024   4:06 PM CDT  To: [facility] Infusion Clinical Support Staff    Are y'all booked out on 7/5??  ----- Message -----  From: Elva Lowery RN  Sent: 7/2/2024   1:51 PM CDT  To: david Thornton we do not.  ----- Message -----  From: Shaniqua Moran  Sent: 6/25/2024   1:14 PM CDT  To: Elva Lowery RN; [facility] Chemo Infusion    Hey, sorry to reach out and bother. By any chance do you all have any availability on 7/5 for this pt for Gemzar if possible please.  ----- Message -----  From: Maeknna Laureano RN  Sent: 6/25/2024  11:38 AM CDT  To: Shaniqua Timmons, we were able to accommodate day 1 on 6/28 but we are also full on 7/5  ----- Message -----  From: Shaniqua Moran  Sent: 6/25/2024  10:56 AM CDT  To: [facility]    Good morning,    By any chance could patient also be scheduled for day 8 gemzar on 7/5 if you have availability that day please. We are booked out over here. I can do day 15 here at main. Please let me know.    Thanks,  Shaniqua

## 2024-07-02 NOTE — PROGRESS NOTES
Call placed per ED Navigator to remind of appt with [facility], Palliative Medicine on 7-3-24 at 1pm. Pt verbalized understanding.

## 2024-07-08 ENCOUNTER — TELEPHONE (OUTPATIENT)
Dept: HEMATOLOGY/ONCOLOGY | Facility: CLINIC | Age: 49
End: 2024-07-08
Payer: MEDICARE

## 2024-07-08 NOTE — TELEPHONE ENCOUNTER
Called patient to confirm nutrition apt with [facility] . Patient needed to reschedule as appointment time no longer works. Patient has been rescheduled.

## 2024-07-09 ENCOUNTER — LAB VISIT (OUTPATIENT)
Dept: LAB | Facility: HOSPITAL | Age: 49
End: 2024-07-09
Attending: INTERNAL MEDICINE
Payer: MEDICARE

## 2024-07-09 ENCOUNTER — OFFICE VISIT (OUTPATIENT)
Dept: HEMATOLOGY/ONCOLOGY | Facility: CLINIC | Age: 49
End: 2024-07-09
Payer: MEDICARE

## 2024-07-09 VITALS
HEIGHT: 73 IN | BODY MASS INDEX: 36.64 KG/M2 | SYSTOLIC BLOOD PRESSURE: 126 MMHG | OXYGEN SATURATION: 98 % | DIASTOLIC BLOOD PRESSURE: 72 MMHG | HEART RATE: 100 BPM | WEIGHT: 276.44 LBS | TEMPERATURE: 98 F | RESPIRATION RATE: 17 BRPM

## 2024-07-09 DIAGNOSIS — Z17.1 MALIGNANT NEOPLASM INVOLVING BOTH NIPPLE AND AREOLA OF RIGHT BREAST IN MALE, ESTROGEN RECEPTOR NEGATIVE: Primary | ICD-10-CM

## 2024-07-09 DIAGNOSIS — H54.62 VISION LOSS OF LEFT EYE: ICD-10-CM

## 2024-07-09 DIAGNOSIS — E11.65 UNCONTROLLED TYPE 2 DIABETES MELLITUS WITH HYPERGLYCEMIA: ICD-10-CM

## 2024-07-09 DIAGNOSIS — C50.021 MALIGNANT NEOPLASM INVOLVING BOTH NIPPLE AND AREOLA OF RIGHT BREAST IN MALE, ESTROGEN RECEPTOR NEGATIVE: ICD-10-CM

## 2024-07-09 DIAGNOSIS — F41.1 ANXIETY ASSOCIATED WITH CANCER DIAGNOSIS: ICD-10-CM

## 2024-07-09 DIAGNOSIS — T45.1X5A CHEMOTHERAPY-INDUCED NEUTROPENIA: ICD-10-CM

## 2024-07-09 DIAGNOSIS — C80.1 ANXIETY ASSOCIATED WITH CANCER DIAGNOSIS: ICD-10-CM

## 2024-07-09 DIAGNOSIS — Z17.1 MALIGNANT NEOPLASM INVOLVING BOTH NIPPLE AND AREOLA OF RIGHT BREAST IN MALE, ESTROGEN RECEPTOR NEGATIVE: ICD-10-CM

## 2024-07-09 DIAGNOSIS — C50.021 MALIGNANT NEOPLASM INVOLVING BOTH NIPPLE AND AREOLA OF RIGHT BREAST IN MALE, ESTROGEN RECEPTOR NEGATIVE: Primary | ICD-10-CM

## 2024-07-09 DIAGNOSIS — C79.51 MALIGNANT NEOPLASM METASTATIC TO BONE: ICD-10-CM

## 2024-07-09 DIAGNOSIS — D63.0 ANEMIA IN NEOPLASTIC DISEASE: ICD-10-CM

## 2024-07-09 DIAGNOSIS — D70.1 CHEMOTHERAPY-INDUCED NEUTROPENIA: ICD-10-CM

## 2024-07-09 DIAGNOSIS — C79.31 SECONDARY MALIGNANT NEOPLASM OF BRAIN: ICD-10-CM

## 2024-07-09 DIAGNOSIS — C78.7 SECONDARY LIVER CANCER: ICD-10-CM

## 2024-07-09 DIAGNOSIS — G89.3 NEOPLASM RELATED PAIN: ICD-10-CM

## 2024-07-09 DIAGNOSIS — I10 PRIMARY HYPERTENSION: ICD-10-CM

## 2024-07-09 DIAGNOSIS — G62.9 NEUROPATHY: ICD-10-CM

## 2024-07-09 LAB
ALBUMIN SERPL BCP-MCNC: 2.9 G/DL (ref 3.5–5.2)
ALP SERPL-CCNC: 337 U/L (ref 55–135)
ALT SERPL W/O P-5'-P-CCNC: 41 U/L (ref 10–44)
ANION GAP SERPL CALC-SCNC: 10 MMOL/L (ref 8–16)
AST SERPL-CCNC: 47 U/L (ref 10–40)
BASOPHILS # BLD AUTO: 0.03 K/UL (ref 0–0.2)
BASOPHILS NFR BLD: 0.4 % (ref 0–1.9)
BILIRUB SERPL-MCNC: 1 MG/DL (ref 0.1–1)
BUN SERPL-MCNC: 9 MG/DL (ref 6–20)
CALCIUM SERPL-MCNC: 9.4 MG/DL (ref 8.7–10.5)
CHLORIDE SERPL-SCNC: 101 MMOL/L (ref 95–110)
CO2 SERPL-SCNC: 23 MMOL/L (ref 23–29)
CREAT SERPL-MCNC: 0.8 MG/DL (ref 0.5–1.4)
DIFFERENTIAL METHOD BLD: ABNORMAL
EOSINOPHIL # BLD AUTO: 0 K/UL (ref 0–0.5)
EOSINOPHIL NFR BLD: 0.1 % (ref 0–8)
ERYTHROCYTE [DISTWIDTH] IN BLOOD BY AUTOMATED COUNT: 22 % (ref 11.5–14.5)
EST. GFR  (NO RACE VARIABLE): >60 ML/MIN/1.73 M^2
GLUCOSE SERPL-MCNC: 102 MG/DL (ref 70–110)
HCT VFR BLD AUTO: 26.8 % (ref 40–54)
HGB BLD-MCNC: 7.9 G/DL (ref 14–18)
IMM GRANULOCYTES # BLD AUTO: 0.37 K/UL (ref 0–0.04)
IMM GRANULOCYTES NFR BLD AUTO: 5 % (ref 0–0.5)
LYMPHOCYTES # BLD AUTO: 0.9 K/UL (ref 1–4.8)
LYMPHOCYTES NFR BLD: 12.3 % (ref 18–48)
MCH RBC QN AUTO: 22.6 PG (ref 27–31)
MCHC RBC AUTO-ENTMCNC: 29.5 G/DL (ref 32–36)
MCV RBC AUTO: 77 FL (ref 82–98)
MONOCYTES # BLD AUTO: 1.1 K/UL (ref 0.3–1)
MONOCYTES NFR BLD: 14.4 % (ref 4–15)
NEUTROPHILS # BLD AUTO: 5.1 K/UL (ref 1.8–7.7)
NEUTROPHILS NFR BLD: 67.8 % (ref 38–73)
NRBC BLD-RTO: 2 /100 WBC
PLATELET # BLD AUTO: 252 K/UL (ref 150–450)
PMV BLD AUTO: 9.6 FL (ref 9.2–12.9)
POTASSIUM SERPL-SCNC: 4.4 MMOL/L (ref 3.5–5.1)
PROT SERPL-MCNC: 7.9 G/DL (ref 6–8.4)
RBC # BLD AUTO: 3.49 M/UL (ref 4.6–6.2)
SODIUM SERPL-SCNC: 134 MMOL/L (ref 136–145)
WBC # BLD AUTO: 7.45 K/UL (ref 3.9–12.7)

## 2024-07-09 PROCEDURE — 1159F MED LIST DOCD IN RCRD: CPT | Mod: CPTII,S$GLB,, | Performed by: INTERNAL MEDICINE

## 2024-07-09 PROCEDURE — 4010F ACE/ARB THERAPY RXD/TAKEN: CPT | Mod: CPTII,S$GLB,, | Performed by: INTERNAL MEDICINE

## 2024-07-09 PROCEDURE — 1160F RVW MEDS BY RX/DR IN RCRD: CPT | Mod: CPTII,S$GLB,, | Performed by: INTERNAL MEDICINE

## 2024-07-09 PROCEDURE — 85027 COMPLETE CBC AUTOMATED: CPT | Performed by: INTERNAL MEDICINE

## 2024-07-09 PROCEDURE — 36415 COLL VENOUS BLD VENIPUNCTURE: CPT | Performed by: INTERNAL MEDICINE

## 2024-07-09 PROCEDURE — 3078F DIAST BP <80 MM HG: CPT | Mod: CPTII,S$GLB,, | Performed by: INTERNAL MEDICINE

## 2024-07-09 PROCEDURE — 3074F SYST BP LT 130 MM HG: CPT | Mod: CPTII,S$GLB,, | Performed by: INTERNAL MEDICINE

## 2024-07-09 PROCEDURE — G2211 COMPLEX E/M VISIT ADD ON: HCPCS | Mod: S$GLB,,, | Performed by: INTERNAL MEDICINE

## 2024-07-09 PROCEDURE — 99999 PR PBB SHADOW E&M-EST. PATIENT-LVL V: CPT | Mod: PBBFAC,,, | Performed by: INTERNAL MEDICINE

## 2024-07-09 PROCEDURE — 99214 OFFICE O/P EST MOD 30 MIN: CPT | Mod: S$GLB,,, | Performed by: INTERNAL MEDICINE

## 2024-07-09 PROCEDURE — 80053 COMPREHEN METABOLIC PANEL: CPT | Performed by: INTERNAL MEDICINE

## 2024-07-09 PROCEDURE — 85007 BL SMEAR W/DIFF WBC COUNT: CPT | Performed by: INTERNAL MEDICINE

## 2024-07-09 PROCEDURE — 3008F BODY MASS INDEX DOCD: CPT | Mod: CPTII,S$GLB,, | Performed by: INTERNAL MEDICINE

## 2024-07-09 NOTE — PROGRESS NOTES
"Subjective     Patient ID: Paul Li Jr. is a 48 y.o. male.    Chief Complaint: Malignant neoplasm involving both nipple and areola of righ    HPI    Presents for follow up  Gemcitabine held previously with elevated transaminases- now trended down and continuing q2 weeks    Overall doing well  Fatigue stable  Pain fairly well controlled - he admits taking Tylenol and Ibuprofen and trying to minimize Norco  No new pain  No N/V  + constipation - reviewed options  No visual changes  No HAs or dizziness  No shortness of breath, rare cough  No fevers or chills  Neuropathy unchanged  He has noted more sweating spells- facial and head area starts sweating  Stopped Nortriptyline that was added for insomnia by palliative - too "loopy"     Weight stable and eating better- supplements with Boost     - 5/9/2024 PET on return to [place]  Now on gemcitabine  FINDINGS:  In the head and neck, hypermetabolic focus in the posterior left orbital apex correlating with soft tissue lesion reaching max SUV of 42 (fused image 42).  There are no hypermetabolic mucosal lesions.  There are no pathologically enlarged or hypermetabolic lymph nodes.     In the chest, postop changes of right mastectomy dissection.  Decreased tracer uptake of the overlying mastectomy scar.  Overall increased number of hypermetabolic mediastinal and hilar lymph nodes.  Few scattered pulmonary nodules with minimal tracer uptake. Index lesions follows:     *1.2 cm subcarinal lymph node with SUV max 6.3 (fused image 135), previously similar in size with SUV max 11.  *Left hilar lymph node with SUV max 13 (fused image 132) without definitive CT correlate, which is new when compared to prior imaging.  *9 mm solid nodule in the right lower lobe now demonstrating mild tracer uptake with SUV max 3.2 (fused image 142), previously measuring 9 mm with SUV max 3.2.  In the abdomen and pelvis, there is a new hypermetabolic liver lesion and left pelvic lymph nodes with " index lesions as follows:     *New 7.1 x 5.6 cm hypoattenuating left hepatic lobe lesion with tracer avid uptake reaching SUV max of 21 (fused image 181).  *New 0.8 cm left external iliac node with SUV max 9.2 (fused image 276).  There is physiologic tracer distribution within the abdominal organs and excretion into the genitourinary system.     In the bones, overall mixed treatment response of multiple hypermetabolic osseous lesions throughout the axial and appendicular skeleton noting numerous cystic lesions and decreased tracer uptake within some index lesions.  Index lesions as follows:     *Left anterior 5th rib lytic lesion with SUV max 9.9 (fused image 164), previous SUV 9.4.  *T8 vertebral body with increased sclerosis and decreased tracer uptake with SUV max 3.7 (fused image 150), previous SUV 7.7.  *New tracer avid uptake of the C7 vertebral body with SUV 10 (fused image 97).  In the extremities, there are no hypermetabolic lesions worrisome for malignancy.     Additional findings:     Right maxillary mucosal retention cyst.  Left-sided chest port with tip terminating in the distal SVC.     Mild mesenteric edema with scattered non hypermetabolic mesenteric lymph nodes.  Small volume pelvic free fluid.  Mild prostatomegaly.     Impression:     Findings overall concerning for disease progression in patient with known metastatic breast cancer status post right mastectomy and right axillary lymph node dissection.     Increased number of hypermetabolic mediastinal and hilar lymph nodes.  New hypermetabolic left pelvic lymph nodes.     New hypermetabolic hepatic mass concerning for metastasis.     Mixed osseous treatment response noting numerous new lesions and decreased uptake within some index lesions.     Hypermetabolic focus in the left orbital apex concerning for metastasis.     Additional findings as above.     Oncology History  Malignant neoplasm involving both nipple and areola of right breast in male,  estrogen receptor negative- initially Stage IB, now metastatic  Metastatic Triple Negative Breast Cancer: progressed from prior Stage IB   5/2/2019: Right breast retroareolar core biopsy  S/p Right breast mastectomy with axillary lymph node dissection in 2019  S/p neoadjuvant ddAC/carbo-taxol, adjuvant xeloda, XRT   Recurrence with bone metastasis in 8/2021, received abraxane and progressed on it in 3/2023  Started on Trodelvy in 5/2023 - ongoing  - His schedule of trodelvy is q8ynuasn with growth factor support due to   neutropenia   Liquid biopsy: PIK3CA mutation  PDL 1 <1%  Precerted for Sacituzumab govitecan-hziy-for a transfer of facility  - PET CT 10/10/23:               - reviewed with previous images  - upon our review, exam is stable except subcarinal LN increase in size (max SUV of 10.9 measuring 2.4 x 2.7 cm, previously was 1.5cm with max SUV 10)  - MRI C spine from 12/15/23 reviewed, shows degenerative changes but no concerning metastatic lesions. Reports for lumbar spine and thoracic spine not uploaded, have requested from [facility]. Reviewed images, show known T6 lesion (seen on PET scan Oct 2023) but no other clear lesions.      1.  Metastatic Breast Cancer:              1.  Right Breast Cancer Stage IB (pT1b pN1):  T=5 mm, G=2, N=1/15, ER=negative, TX=negative. [id number]: IHC 0, FISH=.  Ki67=80%.  MammaPrint=.  Date: 5/2/2019                          A.  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  Biopsy: 5/2/2019: Right breast retroareolar core biopsy                          C.  Staging Studies:                          D.  NeoAdjuvant Chemo:                                       ddAC x 4 / TaxolCarbo wkly x 12.   5/27/2019 - 10/15/2019                          E.  Surgery:                                      11/22/2019: R Mastectomy/SLN Dr Whyte: Right breast mastectomy with SLN biopsy no LVI with 1 LN positive 4 mm, negative margins.                                       12/17/2019:  ALND  0/14 positive for carcinoma.                          F.  XRT: 2/3/2020- 3/23/2020: Total Dose: 50.4 Gy to the chest wall and regional lymphatic                          G.  Adjuvant Chemo:                                        Xeloda 1000 mg/m2 bid days 1-14 every 21 day x 6.   4/15/2020 - 9/28/20                 2.  Recurrence:  7/2/2021.                          A.  Biopsy:  Left iliac bone biopsy: 7/2/2021: Positive for metastatic breast ca                          B.  Staging:                                      MRI T/L spine 6/3/2021:  Abnormal  T12, S1, S2 vertebral bodies, LEFT sacrum and ilium concerning for metastatic disease.                                       PET 6/9/2021: Multiple sclerotic lesions in the T12 vertebral body, sacrum, and left iliac bone with hypermetabolic activity concerning for active metastatic disease.                                      C.  Treatment:                                      Abraxane:  8/13/2021 - 3/25/2023.     2.  Genetic Testing:  Genetic testing: AXIN2, RNF43 (VUS) identified     TREATMENT PLAN:                 SACITUZUMAB q 2wk with Neulasta support:                            5/19/2023 - 7/14/2023.                            Resumed 8/30/2023 - .                 Zometa q 4 wkly a:  8/2022 - .                  Liquid biopsy:                            PIK3CA mutation                          PDL-1 <1%.     Restaging:   PET: 2/15/2022: stable osseous sclerotic lesions and pulmonary micro nodules  PET 6/6/2022: stable   PET 11/4/2022:stable  PET 3/20/2023: New derick metastasis (right hilar node). Otherwise stable  MRI sacrum/coccyx 3/29/2023: Extensive osseous metastatic disease of the pelvis involving much of the sacrum.  No evidence for invasion of the sacral canal or neural foramina.  MRI L-spine 3/29/2023: Dffuse osseous metastatic disease throughout the lumbar spine.  PET 6/22/2023: Findings suggestive of disease  "progression, noting worsening osseous metastatic disease and subcarinal/right hilar lymphadenopathy.  PET 10/10/2023:  Stable except subcarinal LN increase per PET.  CT images are not convincing.     Moved to [place] 6/2023  Moved  back from [place] 2/2024- see summary below.            Diagnosis: Metastatic triple negative male breast cancer, BRCA1 +, progression  His most recent systemic treatment regimen was sacituzumab-govetican Q3w and he received last dose on 2/12/2024  Last PET there per notes revealed progression on 2/27/2024 and no systemic treatment since     Patient moved to [place] in June of 2023 and recently returned to [place] to re-establish care after sudden left eye blindness- he woke up that AM with acute of chronic vision loss, left orbital pain and exophthalmos  He was admitted on return from 3/20- 3/22/2024   In the ED a stroke code was activated and CTA demonstrated "Irregularity of the intracranial vertebral arteries and left PCA as above, likely atherosclerotic disease.  No high-grade stenosis or large vessel occlusion. Osseous metastatic disease similar to prior outside MRI." Opthalmology was consulted who recommended a orbital MRI which demonstrated " There is abnormal soft tissue lesion in the posterior left orbital apex/optic canal which is adjacent to or involving the inferior rectus and medial rectus extra-ocular muscles with associated enhancement, and could represent metastatic disease.  This abuts and compresses the posterior optic nerve at the posterior left orbital apex. There is minimal thickening and increased T2 signal with probable minimal enhancement of the anterior left optic nerve that could represent mild optic neuritis."      He has now completed XRT       Most recent imaging:  - 2/27/2024 PET- not visible in records and will obtain     - 3/20/2024 MRI orbits:  FINDINGS:  Orbits/Optic Nerves:  Minimal thickening and increased T2 signal with probable minimal " enhancement of the left optic nerve could represent mild optic neuritis.  No significant abnormality on the right.  Globes appear within normal limits.  Remainder of the Intracranial Compartment:  No midline shift or hydrocephalus.  No extra-axial blood or fluid collections.  The brain parenchyma appears normal. No cerebral mass lesion, acute hemorrhage, edema, or acute infarct.  There is abnormal soft tissue lesion in the posterior left orbital apex and optic canal, which is adjacent to or involving the inferior rectus and medial rectus extra-ocular muscles.  There is mild associated enhancement.  This could represent metastatic disease.  Minimal associated restricted diffusion on axial 35 of series 6.  This abuts and may compress the posterior optic nerve at the posterior left orbital apex.  Normal vascular flow voids are preserved.  Mild bilateral ethmoid and right maxillary sinus disease.  Skull/Extracranial Contents (limited evaluation): Bone marrow signal intensity is normal.  Impression:  1. There is abnormal soft tissue lesion in the posterior left orbital apex/optic canal which is adjacent to or involving the inferior rectus and medial rectus extra-ocular muscles with associated enhancement, and could represent metastatic disease.  This abuts and compresses the posterior optic nerve at the posterior left orbital apex.  2. There is minimal thickening and increased T2 signal with probable minimal enhancement of the anterior left optic nerve that could represent mild optic neuritis.  Follow-up recommended.  3. Mild paranasal sinus disease.  4. This report was flagged in Epic as abnormal.     Review of Systems   Constitutional:  Positive for appetite change, chills and fatigue. Negative for activity change, fever and unexpected weight change (stabilized).   Eyes:  Positive for visual disturbance.   Respiratory:  Positive for shortness of breath (with exertion). Negative for cough and wheezing.     Cardiovascular:  Negative for chest pain, palpitations and leg swelling.   Gastrointestinal:  Positive for constipation. Negative for abdominal distention, abdominal pain, change in bowel habit, diarrhea, nausea, vomiting and reflux.   Genitourinary:  Negative for bladder incontinence, decreased urine volume, difficulty urinating, frequency, hematuria and urgency.   Musculoskeletal:  Positive for arthralgias, back pain and neck pain. Negative for leg pain.   Neurological:  Positive for dizziness. Negative for weakness (better), numbness, headaches, memory loss and coordination difficulties.   Hematological:  Negative for adenopathy. Does not bruise/bleed easily.   Psychiatric/Behavioral:  Positive for sleep disturbance. Negative for dysphoric mood. The patient is nervous/anxious.           Objective     Physical Exam  Vitals and nursing note reviewed.   Constitutional:       General: He is not in acute distress.     Appearance: Normal appearance. He is not ill-appearing.      Comments: Presents with multiple family members  ECOG=0   HENT:      Head: Normocephalic and atraumatic.      Mouth/Throat:      Pharynx: No oropharyngeal exudate or posterior oropharyngeal erythema.      Comments: Poor dentition  Eyes:      General: No scleral icterus.     Comments: Left eye visual change   Cardiovascular:      Rate and Rhythm: Normal rate and regular rhythm.      Heart sounds: No murmur heard.     No friction rub. No gallop.   Pulmonary:      Effort: Pulmonary effort is normal. No respiratory distress.      Breath sounds: Normal breath sounds. No wheezing, rhonchi or rales.   Abdominal:      General: Abdomen is flat. Bowel sounds are normal. There is no distension.      Palpations: Abdomen is soft. There is no mass.      Tenderness: There is no guarding.   Musculoskeletal:         General: Swelling (lymphedema better RUE) present. No tenderness. Normal range of motion.      Cervical back: Normal range of motion and neck  supple.      Right lower leg: No edema.      Left lower leg: No edema.   Skin:     General: Skin is warm and dry.      Coloration: Skin is not jaundiced or pale.   Neurological:      Mental Status: He is alert and oriented to person, place, and time.      Sensory: Sensory deficit present.      Motor: Weakness (4/5 proximal muscle legs) present.      Gait: Gait normal.      Comments: Decr strength bilateral LE   Psychiatric:         Mood and Affect: Mood normal.         Behavior: Behavior normal.         Thought Content: Thought content normal.         Judgment: Judgment normal.   Labs- reviewed       Assessment and Plan     1. Malignant neoplasm involving both nipple and areola of right breast in male, estrogen receptor negative  Overview:  1.  Metastatic Breast Cancer:   1.  Right Breast Cancer Stage IB (pT1b pN1):  T=5 mm, G=2, N=1/15, ER=negative, [place]=negative. HBH1oyq: IHC 0, FISH=.  Ki67=80%.  MammaPrint=.  Date: 5/2/2019    A.  Histology: Invasive ductal carcinoma with lobular features    B.  Biopsy: 5/2/2019: Right breast retroareolar core biopsy    C.  Staging Studies:    D.  NeoAdjuvant Chemo:      ddAC x 4 / TaxolCarbo wkly x 12.   5/27/2019 - 10/15/2019    E.  Surgery:     11/22/2019: R Mastectomy/SLN Dr Whyte: Right breast mastectomy with SLN biopsy no LVI with 1 LN positive 4 mm, negative margins.     12/17/2019:  ALND  0/14 positive for carcinoma.    F.  XRT: 2/3/2020- 3/23/2020: Total Dose: 50.4 Gy to the chest wall and regional lymphatic    G.  Adjuvant Chemo:       Xeloda 1000 mg/m2 bid days 1-14 every 21 day x 6.   4/15/2020 - 9/28/20     2.  Recurrence:  7/2/2021.    A.  Biopsy:  Left iliac bone biopsy: 7/2/2021: Positive for metastatic breast ca    B.  Staging:     MRI T/L spine 6/3/2021:  Abnormal  T12, S1, S2 vertebral bodies, LEFT sacrum and ilium concerning for metastatic disease.      PET 6/9/2021: Multiple sclerotic lesions in the T12 vertebral body, sacrum, and left iliac bone with  hypermetabolic activity concerning for active metastatic disease.        C.  Treatment:     Abraxane:  8/13/2021 - 3/25/2023.     SACITUZUMAB q 2wk with Neulasta support:        5/19/2023 - 7/14/2023.        Resumed 8/30/2023 - 2/14/2024.  Progression following stable disease.    2.  Genetic Testing:  Genetic testing: AXIN2, RNF43 (VUS) identified.    3.  Vision loss L eye 2/28/2024.    TREATMENT PLAN:     Vision loss OS:  Consult Dr. Colbert 2/28/2024.    MRI Brain and orbits ordered 2/28/2024.     Faslodex pending   Abemaciclib/ribociclib pending    Discontinue trazodone with ribociclib.    Zometa q 4 wk:  8/2022 - .      Liquid biopsy:      PIK3CA mutation.  Pt receive Apelisib in the past.    PDL-1 <1%.    Restaging:   PET: 2/15/2022: stable osseous sclerotic lesions and pulmonary micro nodules  PET 6/6/2022: stable   PET 11/4/2022:stable  PET 3/20/2023: New derick metastasis (right hilar node). Otherwise stable  MRI sacrum/coccyx 3/29/2023: Extensive osseous metastatic disease of the pelvis involving much of the sacrum.  No evidence for invasion of the sacral canal or neural foramina.  MRI L-spine 3/29/2023: Dffuse osseous metastatic disease throughout the lumbar spine.  PET 6/22/2023: Findings suggestive of disease progression, noting worsening osseous metastatic disease and subcarinal/right hilar lymphadenopathy.  PET 10/10/2023:  Stable except subcarinal LN increase per PET.  CT images are not convincing.  PET 2/27/2024:  Progression in intra-thoracic LN.        2. Bone metastases  Overview:  IMO Regualtory Update 4/1/23      3. Secondary malignant neoplasm of brain    4. Secondary liver cancer    5. Neoplasm related pain    6. Anemia in neoplastic disease    7. Chemotherapy-induced neutropenia    8. Uncontrolled type 2 diabetes mellitus with hyperglycemia    9. Neuropathy    10. Anxiety associated with cancer diagnosis    11. Vision loss of left eye    12. Primary hypertension  Overview:  - Managed by  PCP          Metastatic breast cancer BRCA +  Heavily pretreated  On gemcitabine- counts adequate to proceed   Continue q 2 weeks    Scan after next cycle    Pain regimen reviewed- Norco q6-8 hours  No ibuprofen    Anemia- stable- post last transfusion, monitor and possible transfusion next week  Neutropenia resolved    DM  Improved parameters   Trulicity held     HTN  Continue current meds and monitor      Route Chart for Scheduling    Med Onc Chart Routing      Follow up with physician . 3 weeks cbc, cmp and scans and EP   Follow up with J[facility] . 2 weeks cbc, cmp type and screen and EP and chemo next day; possible transfusion   Infusion scheduling note    Injection scheduling note    Labs    Imaging    Pharmacy appointment    Other referrals            Treatment Plan Information   OP BREAST GEMCITABINE QW   Rosa Linn MD   Upcoming Treatment Dates - OP BREAST GEMCITABINE QW    7/5/2024       Pre-Medications       dexAMETHasone (DECADRON) 10 mg in sodium chloride 0.9% 50 mL IVPB       sodium chloride 0.9% bolus 1,000 mL 1,000 mL       Chemotherapy       gemcitabine (GEMZAR) 2,104 mg in 0.9% NaCl SolP 250 mL chemo infusion  7/12/2024       Pre-Medications       dexAMETHasone (DECADRON) 10 mg in sodium chloride 0.9% 50 mL IVPB       sodium chloride 0.9% bolus 1,000 mL 1,000 mL       Chemotherapy       gemcitabine (GEMZAR) 2,104 mg in 0.9% NaCl SolP 250 mL chemo infusion  7/26/2024       Pre-Medications       dexAMETHasone (DECADRON) 10 mg in sodium chloride 0.9% 50 mL IVPB       sodium chloride 0.9% bolus 1,000 mL 1,000 mL       Chemotherapy       gemcitabine (GEMZAR) 2,104 mg in 0.9% NaCl SolP 250 mL chemo infusion  8/2/2024       Pre-Medications       dexAMETHasone (DECADRON) 10 mg in sodium chloride 0.9% 50 mL IVPB       sodium chloride 0.9% bolus 1,000 mL 1,000 mL       Chemotherapy       gemcitabine (GEMZAR) 2,104 mg in 0.9% NaCl SolP 250 mL chemo infusion    Supportive Plan Information  OP FILGRASTIM 480 MCG    Michelle Grayson NP   Upcoming Treatment Dates - OP FILGRASTIM 480 MCG    No upcoming days in selected categories.    Therapy Plan Information  PORT FLUSH  Flushes  heparin, porcine (PF) 100 unit/mL injection flush 500 Units  500 Units, Intravenous, Every visit  sodium chloride 0.9% flush 10 mL  10 mL, Intravenous, Every visit    ZOLEDRONIC ACID (ZOMETA) IV  Medications  zoledronic acid (ZOMETA) 4 mg in sodium chloride 0.9% 100 mL IVPB  4 mg, Intravenous, Every 4 weeks  Flushes  sodium chloride 0.9% 250 mL flush bag  Intravenous, Every 4 weeks  sodium chloride 0.9% flush 10 mL  10 mL, Intravenous, Every 4 weeks  heparin, porcine (PF) 100 unit/mL injection flush 500 Units  500 Units, Intravenous, Every 4 weeks  alteplase injection 2 mg  2 mg, Intra-Catheter, Every 4 weeks

## 2024-07-12 ENCOUNTER — INFUSION (OUTPATIENT)
Dept: INFUSION THERAPY | Facility: HOSPITAL | Age: 49
End: 2024-07-12
Payer: MEDICARE

## 2024-07-12 VITALS
OXYGEN SATURATION: 96 % | SYSTOLIC BLOOD PRESSURE: 133 MMHG | HEIGHT: 73 IN | RESPIRATION RATE: 17 BRPM | HEART RATE: 103 BPM | BODY MASS INDEX: 36.64 KG/M2 | WEIGHT: 276.44 LBS | DIASTOLIC BLOOD PRESSURE: 71 MMHG

## 2024-07-12 DIAGNOSIS — C79.51 MALIGNANT NEOPLASM METASTATIC TO BONE: Primary | ICD-10-CM

## 2024-07-12 DIAGNOSIS — C50.021 MALIGNANT NEOPLASM INVOLVING BOTH NIPPLE AND AREOLA OF RIGHT BREAST IN MALE, ESTROGEN RECEPTOR NEGATIVE: ICD-10-CM

## 2024-07-12 DIAGNOSIS — Z17.1 MALIGNANT NEOPLASM INVOLVING BOTH NIPPLE AND AREOLA OF RIGHT BREAST IN MALE, ESTROGEN RECEPTOR NEGATIVE: ICD-10-CM

## 2024-07-12 PROCEDURE — 96413 CHEMO IV INFUSION 1 HR: CPT

## 2024-07-12 PROCEDURE — 63600175 PHARM REV CODE 636 W HCPCS: Performed by: INTERNAL MEDICINE

## 2024-07-12 PROCEDURE — A4216 STERILE WATER/SALINE, 10 ML: HCPCS | Performed by: INTERNAL MEDICINE

## 2024-07-12 PROCEDURE — 25000003 PHARM REV CODE 250: Performed by: INTERNAL MEDICINE

## 2024-07-12 PROCEDURE — 96367 TX/PROPH/DG ADDL SEQ IV INF: CPT

## 2024-07-12 RX ORDER — DIPHENHYDRAMINE HYDROCHLORIDE 50 MG/ML
50 INJECTION INTRAMUSCULAR; INTRAVENOUS ONCE AS NEEDED
Status: DISCONTINUED | OUTPATIENT
Start: 2024-07-12 | End: 2024-07-12 | Stop reason: HOSPADM

## 2024-07-12 RX ORDER — HEPARIN 100 UNIT/ML
500 SYRINGE INTRAVENOUS
Status: DISCONTINUED | OUTPATIENT
Start: 2024-07-12 | End: 2024-07-12 | Stop reason: HOSPADM

## 2024-07-12 RX ORDER — SODIUM CHLORIDE 0.9 % (FLUSH) 0.9 %
10 SYRINGE (ML) INJECTION
Status: DISCONTINUED | OUTPATIENT
Start: 2024-07-12 | End: 2024-07-12 | Stop reason: HOSPADM

## 2024-07-12 RX ORDER — PROCHLORPERAZINE EDISYLATE 5 MG/ML
10 INJECTION INTRAMUSCULAR; INTRAVENOUS ONCE AS NEEDED
Status: DISCONTINUED | OUTPATIENT
Start: 2024-07-12 | End: 2024-07-12 | Stop reason: HOSPADM

## 2024-07-12 RX ORDER — EPINEPHRINE 0.3 MG/.3ML
0.3 INJECTION SUBCUTANEOUS ONCE AS NEEDED
Status: DISCONTINUED | OUTPATIENT
Start: 2024-07-12 | End: 2024-07-12 | Stop reason: HOSPADM

## 2024-07-12 RX ADMIN — Medication 10 ML: at 04:07

## 2024-07-12 RX ADMIN — SODIUM CHLORIDE: 0.9 INJECTION, SOLUTION INTRAVENOUS at 02:07

## 2024-07-12 RX ADMIN — GEMCITABINE 2104 MG: 38 INJECTION, SOLUTION INTRAVENOUS at 03:07

## 2024-07-12 RX ADMIN — HEPARIN 500 UNITS: 100 SYRINGE at 04:07

## 2024-07-12 RX ADMIN — SODIUM CHLORIDE 1000 ML: 0.9 INJECTION, SOLUTION INTRAVENOUS at 02:07

## 2024-07-12 RX ADMIN — DEXAMETHASONE SODIUM PHOSPHATE 10 MG: 4 INJECTION, SOLUTION INTRA-ARTICULAR; INTRALESIONAL; INTRAMUSCULAR; INTRAVENOUS; SOFT TISSUE at 02:07

## 2024-07-12 NOTE — PLAN OF CARE
Patient completed IVF and gemzar infusions, tolerated well.  Port de accessed, flushed, blood return noted, heparin locked.  RTC 7/26/24  Patient ambulated off floor with cane assist, accompanied by wife.

## 2024-07-12 NOTE — PLAN OF CARE
Patient seated in chair accompanied by wife, VSS, assessment done.  Port accessed, flushed, blood return noted, started NS @ 25 cc/hr KVO as well as 1 liter NS bolus as ordered by MD, while waiting for Gemzar from pharmacy.  WCTM for safety

## 2024-07-13 DIAGNOSIS — G89.3 NEOPLASM RELATED PAIN: ICD-10-CM

## 2024-07-13 DIAGNOSIS — Z17.1 MALIGNANT NEOPLASM INVOLVING BOTH NIPPLE AND AREOLA OF RIGHT BREAST IN MALE, ESTROGEN RECEPTOR NEGATIVE: ICD-10-CM

## 2024-07-13 DIAGNOSIS — C80.1 ANXIETY ASSOCIATED WITH CANCER DIAGNOSIS: ICD-10-CM

## 2024-07-13 DIAGNOSIS — C50.021 MALIGNANT NEOPLASM INVOLVING BOTH NIPPLE AND AREOLA OF RIGHT BREAST IN MALE, ESTROGEN RECEPTOR NEGATIVE: ICD-10-CM

## 2024-07-13 DIAGNOSIS — F41.1 ANXIETY ASSOCIATED WITH CANCER DIAGNOSIS: ICD-10-CM

## 2024-07-15 RX ORDER — MORPHINE SULFATE 15 MG/1
15 TABLET, FILM COATED, EXTENDED RELEASE ORAL 2 TIMES DAILY
Qty: 60 TABLET | Refills: 0 | Status: SHIPPED | OUTPATIENT
Start: 2024-07-15

## 2024-07-15 RX ORDER — HYDROCODONE BITARTRATE AND ACETAMINOPHEN 10; 325 MG/1; MG/1
1 TABLET ORAL EVERY 8 HOURS PRN
Qty: 90 TABLET | Refills: 0 | Status: SHIPPED | OUTPATIENT
Start: 2024-07-15

## 2024-07-15 RX ORDER — ALPRAZOLAM 0.5 MG/1
0.5 TABLET ORAL 3 TIMES DAILY PRN
Qty: 60 TABLET | Refills: 0 | Status: SHIPPED | OUTPATIENT
Start: 2024-07-15 | End: 2024-08-14

## 2024-07-18 ENCOUNTER — TELEPHONE (OUTPATIENT)
Dept: PALLIATIVE MEDICINE | Facility: CLINIC | Age: 49
End: 2024-07-18
Payer: MEDICARE

## 2024-07-23 ENCOUNTER — PATIENT MESSAGE (OUTPATIENT)
Dept: HEMATOLOGY/ONCOLOGY | Facility: CLINIC | Age: 49
End: 2024-07-23
Payer: MEDICARE

## 2024-07-25 ENCOUNTER — PATIENT MESSAGE (OUTPATIENT)
Dept: HEMATOLOGY/ONCOLOGY | Facility: CLINIC | Age: 49
End: 2024-07-25

## 2024-07-25 ENCOUNTER — CLINICAL SUPPORT (OUTPATIENT)
Dept: HEMATOLOGY/ONCOLOGY | Facility: CLINIC | Age: 49
End: 2024-07-25
Attending: RADIOLOGY
Payer: MEDICARE

## 2024-07-25 ENCOUNTER — TELEPHONE (OUTPATIENT)
Dept: HEMATOLOGY/ONCOLOGY | Facility: CLINIC | Age: 49
End: 2024-07-25

## 2024-07-25 VITALS — HEIGHT: 73 IN | BODY MASS INDEX: 36.58 KG/M2 | WEIGHT: 276 LBS

## 2024-07-25 DIAGNOSIS — E11.9 TYPE 2 DIABETES MELLITUS WITHOUT COMPLICATION, UNSPECIFIED WHETHER LONG TERM INSULIN USE: ICD-10-CM

## 2024-07-25 DIAGNOSIS — R63.0 APPETITE LOSS: ICD-10-CM

## 2024-07-25 DIAGNOSIS — E11.65 UNCONTROLLED TYPE 2 DIABETES MELLITUS WITH HYPERGLYCEMIA: ICD-10-CM

## 2024-07-25 DIAGNOSIS — Z71.3 NUTRITIONAL COUNSELING: Primary | ICD-10-CM

## 2024-07-25 DIAGNOSIS — C79.51 MALIGNANT NEOPLASM METASTATIC TO BONE: ICD-10-CM

## 2024-07-25 DIAGNOSIS — C79.31 SECONDARY MALIGNANT NEOPLASM OF BRAIN: ICD-10-CM

## 2024-07-25 DIAGNOSIS — C50.021 MALIGNANT NEOPLASM INVOLVING BOTH NIPPLE AND AREOLA OF RIGHT BREAST IN MALE, ESTROGEN RECEPTOR NEGATIVE: ICD-10-CM

## 2024-07-25 DIAGNOSIS — Z17.1 MALIGNANT NEOPLASM INVOLVING BOTH NIPPLE AND AREOLA OF RIGHT BREAST IN MALE, ESTROGEN RECEPTOR NEGATIVE: ICD-10-CM

## 2024-07-25 NOTE — NURSING
Confirmed appt virtually with Nelda Kearney RD for 2pm today. Encouraged pre-check questionnaire for timely completion, KANDI Griggs.

## 2024-07-25 NOTE — PROGRESS NOTES
Patient self-referred to food pantry and requested emergency access. Food provided today. Patient will be signed up and verified prior to any subsequent visits.

## 2024-07-25 NOTE — NURSING
KANDI Deleon attempted to reach patient via cell phone to schedule virtual for [facility], address hot flashes, cancer-related pain and adjustment (anxiety). Unable to lvm, mailbox if full, will retry. KANDI Griggs.

## 2024-07-26 ENCOUNTER — INFUSION (OUTPATIENT)
Dept: INFUSION THERAPY | Facility: HOSPITAL | Age: 49
End: 2024-07-26
Payer: MEDICARE

## 2024-07-26 ENCOUNTER — TELEPHONE (OUTPATIENT)
Dept: HEMATOLOGY/ONCOLOGY | Facility: CLINIC | Age: 49
End: 2024-07-26
Payer: MEDICARE

## 2024-07-26 ENCOUNTER — LAB VISIT (OUTPATIENT)
Dept: LAB | Facility: HOSPITAL | Age: 49
End: 2024-07-26
Attending: INTERNAL MEDICINE
Payer: MEDICARE

## 2024-07-26 VITALS
TEMPERATURE: 99 F | OXYGEN SATURATION: 100 % | HEIGHT: 73 IN | DIASTOLIC BLOOD PRESSURE: 69 MMHG | WEIGHT: 267.19 LBS | SYSTOLIC BLOOD PRESSURE: 115 MMHG | HEART RATE: 98 BPM | BODY MASS INDEX: 35.41 KG/M2 | RESPIRATION RATE: 18 BRPM

## 2024-07-26 DIAGNOSIS — C79.51 MALIGNANT NEOPLASM METASTATIC TO BONE: Primary | ICD-10-CM

## 2024-07-26 DIAGNOSIS — C79.51 MALIGNANT NEOPLASM METASTATIC TO BONE: ICD-10-CM

## 2024-07-26 DIAGNOSIS — C79.31 SECONDARY MALIGNANT NEOPLASM OF BRAIN: ICD-10-CM

## 2024-07-26 DIAGNOSIS — C50.021 MALIGNANT NEOPLASM INVOLVING BOTH NIPPLE AND AREOLA OF RIGHT BREAST IN MALE, ESTROGEN RECEPTOR NEGATIVE: Primary | ICD-10-CM

## 2024-07-26 DIAGNOSIS — Z17.1 MALIGNANT NEOPLASM INVOLVING BOTH NIPPLE AND AREOLA OF RIGHT BREAST IN MALE, ESTROGEN RECEPTOR NEGATIVE: ICD-10-CM

## 2024-07-26 DIAGNOSIS — G89.3 NEOPLASM RELATED PAIN: ICD-10-CM

## 2024-07-26 DIAGNOSIS — D63.0 ANEMIA IN NEOPLASTIC DISEASE: Primary | ICD-10-CM

## 2024-07-26 DIAGNOSIS — Z17.1 MALIGNANT NEOPLASM INVOLVING BOTH NIPPLE AND AREOLA OF RIGHT BREAST IN MALE, ESTROGEN RECEPTOR NEGATIVE: Primary | ICD-10-CM

## 2024-07-26 DIAGNOSIS — D63.0 ANEMIA IN NEOPLASTIC DISEASE: ICD-10-CM

## 2024-07-26 DIAGNOSIS — C50.021 MALIGNANT NEOPLASM INVOLVING BOTH NIPPLE AND AREOLA OF RIGHT BREAST IN MALE, ESTROGEN RECEPTOR NEGATIVE: ICD-10-CM

## 2024-07-26 LAB
ABO + RH BLD: NORMAL
ALBUMIN SERPL BCP-MCNC: 2.6 G/DL (ref 3.5–5.2)
ALP SERPL-CCNC: 313 U/L (ref 55–135)
ALT SERPL W/O P-5'-P-CCNC: 24 U/L (ref 10–44)
ANION GAP SERPL CALC-SCNC: 13 MMOL/L (ref 8–16)
AST SERPL-CCNC: 44 U/L (ref 10–40)
BASOPHILS # BLD AUTO: 0.02 K/UL (ref 0–0.2)
BASOPHILS NFR BLD: 0.2 % (ref 0–1.9)
BILIRUB SERPL-MCNC: 1.1 MG/DL (ref 0.1–1)
BLD GP AB SCN CELLS X3 SERPL QL: NORMAL
BLD PROD TYP BPU: NORMAL
BLOOD UNIT EXPIRATION DATE: NORMAL
BLOOD UNIT TYPE CODE: 6200
BLOOD UNIT TYPE: NORMAL
BUN SERPL-MCNC: 9 MG/DL (ref 6–20)
CALCIUM SERPL-MCNC: 9.4 MG/DL (ref 8.7–10.5)
CHLORIDE SERPL-SCNC: 100 MMOL/L (ref 95–110)
CO2 SERPL-SCNC: 20 MMOL/L (ref 23–29)
CODING SYSTEM: NORMAL
CREAT SERPL-MCNC: 0.8 MG/DL (ref 0.5–1.4)
CROSSMATCH INTERPRETATION: NORMAL
DIFFERENTIAL METHOD BLD: ABNORMAL
DISPENSE STATUS: NORMAL
EOSINOPHIL # BLD AUTO: 0 K/UL (ref 0–0.5)
EOSINOPHIL NFR BLD: 0 % (ref 0–8)
ERYTHROCYTE [DISTWIDTH] IN BLOOD BY AUTOMATED COUNT: 22.5 % (ref 11.5–14.5)
EST. GFR  (NO RACE VARIABLE): >60 ML/MIN/1.73 M^2
GLUCOSE SERPL-MCNC: 122 MG/DL (ref 70–110)
HCT VFR BLD AUTO: 24.1 % (ref 40–54)
HGB BLD-MCNC: 7.2 G/DL (ref 14–18)
IMM GRANULOCYTES # BLD AUTO: 0.42 K/UL (ref 0–0.04)
IMM GRANULOCYTES NFR BLD AUTO: 4.8 % (ref 0–0.5)
LYMPHOCYTES # BLD AUTO: 0.9 K/UL (ref 1–4.8)
LYMPHOCYTES NFR BLD: 10.3 % (ref 18–48)
MCH RBC QN AUTO: 22.9 PG (ref 27–31)
MCHC RBC AUTO-ENTMCNC: 29.9 G/DL (ref 32–36)
MCV RBC AUTO: 77 FL (ref 82–98)
MONOCYTES # BLD AUTO: 1 K/UL (ref 0.3–1)
MONOCYTES NFR BLD: 11 % (ref 4–15)
NEUTROPHILS # BLD AUTO: 6.4 K/UL (ref 1.8–7.7)
NEUTROPHILS NFR BLD: 73.7 % (ref 38–73)
NRBC BLD-RTO: 3 /100 WBC
NUM UNITS TRANS PACKED RBC: NORMAL
PLATELET # BLD AUTO: 376 K/UL (ref 150–450)
PMV BLD AUTO: 10.1 FL (ref 9.2–12.9)
POTASSIUM SERPL-SCNC: 4.3 MMOL/L (ref 3.5–5.1)
PROT SERPL-MCNC: 7.6 G/DL (ref 6–8.4)
RBC # BLD AUTO: 3.15 M/UL (ref 4.6–6.2)
SODIUM SERPL-SCNC: 133 MMOL/L (ref 136–145)
SPECIMEN OUTDATE: NORMAL
WBC # BLD AUTO: 8.7 K/UL (ref 3.9–12.7)

## 2024-07-26 PROCEDURE — 96361 HYDRATE IV INFUSION ADD-ON: CPT

## 2024-07-26 PROCEDURE — P9040 RBC LEUKOREDUCED IRRADIATED: HCPCS | Performed by: NURSE PRACTITIONER

## 2024-07-26 PROCEDURE — 80053 COMPREHEN METABOLIC PANEL: CPT | Performed by: INTERNAL MEDICINE

## 2024-07-26 PROCEDURE — 36415 COLL VENOUS BLD VENIPUNCTURE: CPT | Performed by: INTERNAL MEDICINE

## 2024-07-26 PROCEDURE — 96367 TX/PROPH/DG ADDL SEQ IV INF: CPT

## 2024-07-26 PROCEDURE — 86850 RBC ANTIBODY SCREEN: CPT | Performed by: INTERNAL MEDICINE

## 2024-07-26 PROCEDURE — 63600175 PHARM REV CODE 636 W HCPCS: Performed by: NURSE PRACTITIONER

## 2024-07-26 PROCEDURE — 86922 COMPATIBILITY TEST ANTIGLOB: CPT | Performed by: NURSE PRACTITIONER

## 2024-07-26 PROCEDURE — 86902 BLOOD TYPE ANTIGEN DONOR EA: CPT | Performed by: NURSE PRACTITIONER

## 2024-07-26 PROCEDURE — 25000003 PHARM REV CODE 250: Performed by: NURSE PRACTITIONER

## 2024-07-26 PROCEDURE — 96413 CHEMO IV INFUSION 1 HR: CPT

## 2024-07-26 PROCEDURE — 85025 COMPLETE CBC W/AUTO DIFF WBC: CPT | Performed by: INTERNAL MEDICINE

## 2024-07-26 RX ORDER — DIPHENHYDRAMINE HYDROCHLORIDE 50 MG/ML
50 INJECTION INTRAMUSCULAR; INTRAVENOUS ONCE AS NEEDED
OUTPATIENT
Start: 2024-08-16

## 2024-07-26 RX ORDER — HYDROCODONE BITARTRATE AND ACETAMINOPHEN 500; 5 MG/1; MG/1
TABLET ORAL ONCE
Status: CANCELLED | OUTPATIENT
Start: 2024-07-26 | End: 2024-07-26

## 2024-07-26 RX ORDER — DIPHENHYDRAMINE HYDROCHLORIDE 50 MG/ML
25 INJECTION INTRAMUSCULAR; INTRAVENOUS
Status: DISCONTINUED | OUTPATIENT
Start: 2024-07-26 | End: 2024-07-26 | Stop reason: HOSPADM

## 2024-07-26 RX ORDER — PROCHLORPERAZINE EDISYLATE 5 MG/ML
10 INJECTION INTRAMUSCULAR; INTRAVENOUS ONCE AS NEEDED
Start: 2024-08-16

## 2024-07-26 RX ORDER — SODIUM CHLORIDE 0.9 % (FLUSH) 0.9 %
10 SYRINGE (ML) INJECTION
Status: CANCELLED | OUTPATIENT
Start: 2024-08-02

## 2024-07-26 RX ORDER — PROCHLORPERAZINE EDISYLATE 5 MG/ML
10 INJECTION INTRAMUSCULAR; INTRAVENOUS ONCE AS NEEDED
Status: CANCELLED
Start: 2024-08-02

## 2024-07-26 RX ORDER — SODIUM CHLORIDE 0.9 % (FLUSH) 0.9 %
10 SYRINGE (ML) INJECTION
OUTPATIENT
Start: 2024-08-16

## 2024-07-26 RX ORDER — DIPHENHYDRAMINE HYDROCHLORIDE 50 MG/ML
25 INJECTION INTRAMUSCULAR; INTRAVENOUS
Status: CANCELLED | OUTPATIENT
Start: 2024-07-26

## 2024-07-26 RX ORDER — EPINEPHRINE 0.3 MG/.3ML
0.3 INJECTION SUBCUTANEOUS ONCE AS NEEDED
Status: DISCONTINUED | OUTPATIENT
Start: 2024-07-26 | End: 2024-07-26 | Stop reason: HOSPADM

## 2024-07-26 RX ORDER — HYDROCODONE BITARTRATE AND ACETAMINOPHEN 500; 5 MG/1; MG/1
TABLET ORAL ONCE
Status: DISCONTINUED | OUTPATIENT
Start: 2024-07-26 | End: 2024-07-26 | Stop reason: HOSPADM

## 2024-07-26 RX ORDER — HEPARIN 100 UNIT/ML
500 SYRINGE INTRAVENOUS
Status: DISCONTINUED | OUTPATIENT
Start: 2024-07-26 | End: 2024-07-26 | Stop reason: HOSPADM

## 2024-07-26 RX ORDER — EPINEPHRINE 0.3 MG/.3ML
0.3 INJECTION SUBCUTANEOUS ONCE AS NEEDED
OUTPATIENT
Start: 2024-08-16

## 2024-07-26 RX ORDER — EPINEPHRINE 0.3 MG/.3ML
0.3 INJECTION SUBCUTANEOUS ONCE AS NEEDED
Status: CANCELLED | OUTPATIENT
Start: 2024-08-02

## 2024-07-26 RX ORDER — SODIUM CHLORIDE 0.9 % (FLUSH) 0.9 %
10 SYRINGE (ML) INJECTION
Status: DISCONTINUED | OUTPATIENT
Start: 2024-07-26 | End: 2024-07-26 | Stop reason: HOSPADM

## 2024-07-26 RX ORDER — HEPARIN 100 UNIT/ML
500 SYRINGE INTRAVENOUS
Status: CANCELLED | OUTPATIENT
Start: 2024-08-02

## 2024-07-26 RX ORDER — ACETAMINOPHEN 325 MG/1
650 TABLET ORAL
Status: DISCONTINUED | OUTPATIENT
Start: 2024-07-26 | End: 2024-07-26 | Stop reason: HOSPADM

## 2024-07-26 RX ORDER — ACETAMINOPHEN 325 MG/1
650 TABLET ORAL
Status: CANCELLED | OUTPATIENT
Start: 2024-07-26

## 2024-07-26 RX ORDER — DIPHENHYDRAMINE HYDROCHLORIDE 50 MG/ML
50 INJECTION INTRAMUSCULAR; INTRAVENOUS ONCE AS NEEDED
Status: CANCELLED | OUTPATIENT
Start: 2024-08-02

## 2024-07-26 RX ORDER — HEPARIN 100 UNIT/ML
500 SYRINGE INTRAVENOUS
OUTPATIENT
Start: 2024-08-16

## 2024-07-26 RX ORDER — DIPHENHYDRAMINE HYDROCHLORIDE 50 MG/ML
50 INJECTION INTRAMUSCULAR; INTRAVENOUS ONCE AS NEEDED
Status: DISCONTINUED | OUTPATIENT
Start: 2024-07-26 | End: 2024-07-26 | Stop reason: HOSPADM

## 2024-07-26 RX ORDER — PROCHLORPERAZINE EDISYLATE 5 MG/ML
10 INJECTION INTRAMUSCULAR; INTRAVENOUS ONCE AS NEEDED
Status: DISCONTINUED | OUTPATIENT
Start: 2024-07-26 | End: 2024-07-26 | Stop reason: HOSPADM

## 2024-07-26 RX ADMIN — DEXAMETHASONE SODIUM PHOSPHATE 10 MG: 4 INJECTION, SOLUTION INTRA-ARTICULAR; INTRALESIONAL; INTRAMUSCULAR; INTRAVENOUS; SOFT TISSUE at 02:07

## 2024-07-26 RX ADMIN — GEMCITABINE 2105 MG: 38 INJECTION, SOLUTION INTRAVENOUS at 04:07

## 2024-07-26 RX ADMIN — HEPARIN 500 UNITS: 100 SYRINGE at 05:07

## 2024-07-26 RX ADMIN — SODIUM CHLORIDE 1000 ML: 9 INJECTION, SOLUTION INTRAVENOUS at 02:07

## 2024-07-26 NOTE — TELEPHONE ENCOUNTER
Pt in infusion center for C3D1 of gemzar.  Labs reviewed.  Hgb 7.2,  pt getting sob with exertion. 1 unit of prbcs ordered.  Gemzar orders signed. Pt is in need of clinic visit next week.  Message sent to nurse to help schedule this.      Shirlene Loar NP

## 2024-07-26 NOTE — PLAN OF CARE
Problem: Chemotherapy Effects  Goal: Anemia Symptom Improvement  Outcome: Progressing  Goal: Safety Maintained  Outcome: Progressing  Goal: Absence of Hematuria  Outcome: Progressing  Goal: Nausea and Vomiting Relief  Outcome: Progressing  Goal: Neurotoxicity Symptom Control  Outcome: Progressing  Goal: Absence of Infection  Outcome: Progressing  Goal: Absence of Bleeding  Outcome: Progressing     Problem: Fall Injury Risk  Goal: Absence of Fall and Fall-Related Injury  Outcome: Progressing     Ambulatory to clinic via cane.  No c/o adverse effects or s/s of infection.  PAC accessed, flushed with out difficulty, blood return noted and infused with no problems.  Gemzar, IVF's/PRBC's tolerated with no problems.  Ambulatory home with NAD.

## 2024-07-26 NOTE — Clinical Note
Zoe can we get this patient in for a visit with JUSTYNA Mcwilliams or Michelle next week prior to his infusion on Friday.  He was suppose to have a visit this week, but it did not happen.  Thanks.  Shirlene

## 2024-07-29 ENCOUNTER — OFFICE VISIT (OUTPATIENT)
Dept: PALLIATIVE MEDICINE | Facility: CLINIC | Age: 49
End: 2024-07-29
Payer: MEDICARE

## 2024-07-29 ENCOUNTER — TELEPHONE (OUTPATIENT)
Dept: PALLIATIVE MEDICINE | Facility: CLINIC | Age: 49
End: 2024-07-29
Payer: MEDICARE

## 2024-07-29 DIAGNOSIS — K59.00 CONSTIPATION, UNSPECIFIED CONSTIPATION TYPE: ICD-10-CM

## 2024-07-29 DIAGNOSIS — R19.7 DIARRHEA, UNSPECIFIED TYPE: ICD-10-CM

## 2024-07-29 DIAGNOSIS — F43.23 ADJUSTMENT DISORDER WITH MIXED ANXIETY AND DEPRESSED MOOD: ICD-10-CM

## 2024-07-29 DIAGNOSIS — G47.00 INSOMNIA, UNSPECIFIED TYPE: ICD-10-CM

## 2024-07-29 DIAGNOSIS — Z17.1 MALIGNANT NEOPLASM INVOLVING BOTH NIPPLE AND AREOLA OF RIGHT BREAST IN MALE, ESTROGEN RECEPTOR NEGATIVE: Primary | ICD-10-CM

## 2024-07-29 DIAGNOSIS — G89.3 NEOPLASM RELATED PAIN: ICD-10-CM

## 2024-07-29 DIAGNOSIS — G25.81 RESTLESS LEG: ICD-10-CM

## 2024-07-29 DIAGNOSIS — T45.1X5A CHEMOTHERAPY-INDUCED NEUROPATHY: ICD-10-CM

## 2024-07-29 DIAGNOSIS — R53.0 NEOPLASTIC (MALIGNANT) RELATED FATIGUE: ICD-10-CM

## 2024-07-29 DIAGNOSIS — R63.0 ANOREXIA: ICD-10-CM

## 2024-07-29 DIAGNOSIS — R11.0 NAUSEA: ICD-10-CM

## 2024-07-29 DIAGNOSIS — G62.0 CHEMOTHERAPY-INDUCED NEUROPATHY: ICD-10-CM

## 2024-07-29 DIAGNOSIS — Z51.5 ENCOUNTER FOR PALLIATIVE CARE: ICD-10-CM

## 2024-07-29 DIAGNOSIS — C50.021 MALIGNANT NEOPLASM INVOLVING BOTH NIPPLE AND AREOLA OF RIGHT BREAST IN MALE, ESTROGEN RECEPTOR NEGATIVE: Primary | ICD-10-CM

## 2024-07-29 DIAGNOSIS — Z71.89 ADVANCED CARE PLANNING/COUNSELING DISCUSSION: ICD-10-CM

## 2024-07-29 PROCEDURE — 1160F RVW MEDS BY RX/DR IN RCRD: CPT | Mod: CPTII,95,, | Performed by: STUDENT IN AN ORGANIZED HEALTH CARE EDUCATION/TRAINING PROGRAM

## 2024-07-29 PROCEDURE — 4010F ACE/ARB THERAPY RXD/TAKEN: CPT | Mod: CPTII,95,, | Performed by: STUDENT IN AN ORGANIZED HEALTH CARE EDUCATION/TRAINING PROGRAM

## 2024-07-29 PROCEDURE — 99215 OFFICE O/P EST HI 40 MIN: CPT | Mod: 95,,, | Performed by: STUDENT IN AN ORGANIZED HEALTH CARE EDUCATION/TRAINING PROGRAM

## 2024-07-29 PROCEDURE — 1159F MED LIST DOCD IN RCRD: CPT | Mod: CPTII,95,, | Performed by: STUDENT IN AN ORGANIZED HEALTH CARE EDUCATION/TRAINING PROGRAM

## 2024-07-29 RX ORDER — ROPINIROLE 0.25 MG/1
0.25 TABLET, FILM COATED ORAL NIGHTLY
Qty: 30 TABLET | Refills: 0 | Status: SHIPPED | OUTPATIENT
Start: 2024-07-29

## 2024-07-29 NOTE — PROGRESS NOTES
[facility]  [facility]  Progress Note    The patient location is: home  The chief complaint leading to consultation is: symptom management/GOC    Visit type: audiovisual    Face to Face time with patient: 23 minutes  30 minutes of total time spent on the encounter, which includes face to face time and non-face to face time preparing to see the patient (eg, review of tests), Obtaining and/or reviewing separately obtained history, Documenting clinical information in the electronic or other health record, Independently interpreting results (not separately reported) and communicating results to the patient/family/caregiver, or Care coordination (not separately reported).      Each patient to whom he or she provides medical services by telemedicine is:  (1) informed of the relationship between the physician and patient and the respective role of any other health care provider with respect to management of the patient; and (2) notified that he or she may decline to receive medical services by telemedicine and may withdraw from such care at any time.    Reason for Consult: symptom management and ACP   Referring MD: Dr. 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 iLnn  - most recent imaging showed progression of disease  - patient moved to [place] and received treatment in [facility] during that time. He is now back in town and re-established care with Dr. Linn (med onc), Dr. Sandoval (onc-psych), and this clinic  - soft tissue lesion noted on left orbital apex/optic canal s/p radiation therapy  - patient seen in ED on 04/15/2024 to rule out cord compression then elected to go home from ED once cord compression ruled out  - currently on Gemcitabine and Zometa    Encounter  for palliative care/Advance Care Planning   - patient decisional and accompanied by his wife today on telemedicine visit  - no ACP documents uploaded into EMR  - goals: life prolonging  - philosophy of Palliative Medicine and new patient folder given to and briefly reviewed with patient at first visit  - ACP booklet given to and reviewed with patient at first visit by Pall Med RN  - code status not specifically discussed at this visit  - will follow up at future appointments for any questions/concerns that arise after patient reviews new patient information   - per chart review, patient's oncology team did start talk about overall poor prognosis of his cancer prior to his move to [place]  - patient initially told that there were no further treatment options but then he was relieved to hear that there is an option available to him.  - patient states he wants to continue all disease directed therapy options available to him  - at previous visit: spoke about the difficulty of hearing no further treatment. Started discussion at previous visit about if this treatment regimen does not work or if there are no treatment options after this regimen, then patient can continue to follow with this clinic while able or patient can enroll in hospice care. Did not go into detail about hospice care.      Adjustment disorder with mixed anxiety and depressed mood  - patient doing well at this time. He notes he was able to enjoy family time and recent family birthday party this past weekend  - patient states he still has moderate anxiety at times. He finds speaking with his brother very helpful.  - he reports living with his parents at this time; he previously requested assistance to see if there are resources available for housing.   - he notes that he feels anxious at times and has to do things in certain order as well as quick to anger over things that never previously bothered him.   - currently on fluoxitine 80 mg daily and xanax 0.5  mg PRN TID  - patient re-established care with oncology psychology, Dr. Sandoval. Per Dr. Sandoval, patient has stopped follow ups with her at this time as he feels he is in a good place emotionally  - Patient's spouse now also with therapist  - patient did not tolerate Wellbutrin; he notes feeling like a zombie with this medication; STOP wellbutrin today  - emotional support provided along with Pall med SW; please see SW note for full details  - patient expressed concerns around decreased sex drive and wanting to start on medication to help improve libido/ED. Wife discussed other forms of intimacy. Patient stating he felt that he is not able to provide for his family and this is yet something else he cannot control. Will discuss with oncology prior to any medication being administered  - will continue to monitor closely     Insomnia/Neoplasm Related fatigue  - worsening  - he reports feeling like his legs have to move constantly at night; describes more as a dance movement in bed.   - usually gets ~ 4 hours of sleep  - stop trazodone   - continue nortriptyline 50 mg qhs for neuropathy and sleep  - Pall Med SW re-discussed body scan techniques and mediation at previous visit  - concern about possible RLS; he is taking iron supplementation for JOSÉ MIGUEL. Will start low dose ropinerol qhs for RLS  - patient feels more fatigued initially after treatment; he usually has one to two days that he recovers after treatment  - he reports today is his recovery day as he had chemo plus transfusion then family birthday party the day after treatment. No need for pharmacologic intervention at this time.   - tip sheet in new patient folder for patient to review  - will continue close monitoring    Cancer related pain/Neuropathic pain  - patient with moderate pain in his lower back that he describes as nerve pain  - he notes that if he does certain maneuvers (eg. Bending a certain way) the pain occurs and takes a while to resolve  -  patient is weaning off ibuprofen  - he is using Norco scheduled for BID  - he continue to experience neuropathy in his bilateral lower extremities; compliant with gabapentin 900 mg TID  - no need for refills at this time  - patient was started on MS Contin while receiving treatment in [place]; patient notes that he does not find it helpful for the pain he has. He states that he does not have any other pain at this time except the back/nerve pain  - STOPPED MS Contin  - he notes feeling like his whole body will tense up at times; will reach out to oncology about possibility of oncologic massage therapy  - opioid safety sheet in new patient folder for patient to review  - will continue to monitor    Nausea/Anorexia  - no issues with nausea at this time  - patient reports appetite fluctuates; there are days that he does not want to eat anything and other days where he will want to eat everything  - discussed using smaller portions more frequently and protein shakes/smoothies as needed  - discussed staying hydrated as well  - patient uses zofran prn to help with nausea    Constipation/Diarrhea  - no issues at this time; previously would fluctuate between constipation and diarrhea.   - previously discussed using bowel regimen to prevent build up of stool  - discussed need for adequate hydration  - constipation tip sheet in new patient folder for patient to review    Understanding of illness/Prognosis: patient has good understanding of his illness; prognosis is poor    Goals of care: life prolonging    Follow up: ~ 6 weeks    Patient's encounter and above plan of care discussed with patient's oncology-psychologist and oncologist    SUBJECTIVE:     History of Present Illness:  Patient is a 48 y.o. year old male with arthritis, DM, HTN, and right sided breast cancer presents to Palliative Medicine for symptom management and ACP. Please see oncology notes for full details of oncologic history and treatment course.  "    07/29/2024:  THEE AGRCIA reviewed and summarized:  07/15/2024 Alprazolam 0.5 mg disp: 60 for 20 days  07/15/2024 MS contin 15 mg disp: 60 for 30 days  07/15/2024 Hydrocodone-apap  mg Disp: 90 for 30 days    History of Present Illness    CHIEF COMPLAINT:  - Follow-up on chemotherapy side effects  - Decreased libido  - Sleep disturbances    HISTORY OBTAINED FROM:  - Patient and Wife  - Sade ([facility])    HPI:  The patient reports experiencing a significant decrease in libido since starting a new chemotherapy regimen, which he denies with previous treatments. He expresses frustration about this change and its impact on his relationship. The patient also describes sleep disturbances, including difficulty falling and staying asleep. Some nights he sleeps well, but others he is awake until 5:00 or 6:00 AM despite taking prescribed sleep medication. He experiences restless leg syndrome symptoms, describing a constant need to move his legs while lying down, which he calls "dancing" in bed. These movements can persist for hours and often wake him. The patient notes that smoking marijuana before bed helps him sleep soundly, but he does not do this regularly. He reports occasional racing thoughts or worries that keep him awake once or twice weekly at most. His mood has been generally stable, with only minor irritability triggered by certain conversations about his condition. He reports good pain control with Norco, which he takes twice daily as scheduled. The patient expresses feeling confident about his current treatment, noting the regrowth of facial hair as a positive sign.    GOALS OF CARE/ADVANCED DIRECTIVES:  - Completed Advanced Directives and medical power of  a few weeks ago  Not yet submitted to the medical team  Patient finds the process eerie  Patient hopes not to use it for another 10, 20 years  Patient lives at home with his wife  Family, including children and grandchildren, provide support and " visit frequently  Patient values quality time with family  Appreciation for recent time alone with his wife    ACTIVITIES OF DAILY LIVING:  - Tired after family outings and chemo treatment  Experiences sleep disturbances and restless leg syndrome symptoms at night  Sleep improves when using marijuana before bed  Not a regular practice  Maintains the ability to attend family gatherings and outings  Excitement about being able to go to the [facility] again due to facial hair regrowth    SOCIAL HISTORY:  - Occasional marijuana use to help with sleep  -          Please see previous notes for full details of encounters on 10/11/2021; 12/13/2021; 02/11/2022; 09/02/2022; 09/30/2022; 10/11/2022; 12/15/2022; 02/09/2023; 02/27/2023; 05/11/2023; 06/14/2023; 05/01/2024; 06/04/2024;     Past Medical History:   Diagnosis Date    Breast cancer     Cancer     R breast    Diabetes mellitus     HTN (hypertension)     Leg edema, right     Prediabetes     Seizures     at age 18 - stress related    Therapy     marital counseling     Past Surgical History:   Procedure Laterality Date    AXILLARY NODE DISSECTION Right 11/22/2019    Procedure: LYMPHADENECTOMY, AXILLARY RIGHT;  Surgeon: Shima Whyte MD;  Location: [facility];  Service: General;  Laterality: Right;    AXILLARY NODE DISSECTION Right 12/17/2019    Procedure: LYMPHADENECTOMY, AXILLARY;  Surgeon: Shima Whyte MD;  Location: [address];  Service: General;  Laterality: Right;    BREAST BIOPSY      EXCISION OF HYDROCELE Right 10/28/2022    Procedure: HYDROCELECTOMY;  Surgeon: Anthony Cordero Jr., MD;  Location: [address];  Service: Urology;  Laterality: Right;  1 hour    INSERTION OF TUNNELED CENTRAL VENOUS CATHETER (CVC) WITH SUBCUTANEOUS PORT Left 5/24/2019    Procedure: [id number];  Surgeon: Shima Whyte MD;  Location: [address];  Service: General;  Laterality: Left;    INSERTION OF TUNNELED CENTRAL VENOUS CATHETER (CVC) WITH SUBCUTANEOUS PORT  Left 9/17/2021    Procedure: INSERTION, SINGLE LUMEN CATHETER WITH PORT, WITH FLUOROSCOPIC GUIDANCE, right;  Surgeon: Gloria Holliday MD;  Location: [address];  Service: General;  Laterality: Left;  rapid covid test    SENTINEL LYMPH NODE BIOPSY Right 11/22/2019    Procedure: BIOPSY, LYMPH NODE, SENTINEL RIGHT;  Surgeon: Shima Whyte MD;  Location: [facility];  Service: General;  Laterality: Right;    SIMPLE MASTECTOMY Right 11/22/2019    Procedure: MASTECTOMY, SIMPLE RIGHT (CONSENT AM OF) 2.5 hr case;  Surgeon: Shima Whyte MD;  Location: [facility];  Service: General;  Laterality: Right;     Family History   Problem Relation Name Age of Onset    Hypertension Mother      Diabetes Mother      Hypertension Father      Lupus Father      Post-traumatic stress disorder Brother      No Known Problems Maternal Grandmother      Colon cancer Maternal Grandfather      Breast cancer Paternal Grandmother      No Known Problems Paternal Grandfather      No Known Problems Sister      No Known Problems Maternal Aunt      No Known Problems Maternal Uncle      Fibromyalgia Paternal Aunt      Lupus Paternal Aunt      No Known Problems Paternal Uncle      No Known Problems Brother      No Known Problems Sister      No Known Problems Son      No Known Problems Son      No Known Problems Son      No Known Problems Son       Review of patient's allergies indicates:  No Known Allergies    Medications:    Current Outpatient Medications:     ALPRAZolam (XANAX) 0.5 MG tablet, Take 1 tablet (0.5 mg total) by mouth 3 (three) times daily as needed for Insomnia or Anxiety., Disp: 60 tablet, Rfl: 0    amLODIPine (NORVASC) 10 MG tablet, TAKE 1 TABLET (10 MG) BY MOUTH EVERY DAY, Disp: 90 tablet, Rfl: 3    bisacodyL (DULCOLAX) 5 mg EC tablet, Take 1 tablet (5 mg total) by mouth daily as needed for Constipation., Disp: 30 tablet, Rfl: 1    calcium-vitamin D3 (OYSTER SHELL CALCIUM-VIT D3) 500 mg-5 mcg (200 unit) per tablet, Take 1 tablet by  mouth 2 (two) times daily., Disp: 180 tablet, Rfl: 3    diphenoxylate-atropine 2.5-0.025 mg (LOMOTIL) 2.5-0.025 mg per tablet, Take 1 tablet by mouth 4 (four) times daily as needed for Diarrhea., Disp: 60 tablet, Rfl: 2    dulaglutide (TRULICITY) 1.5 mg/0.5 mL pen injector, Inject 1.5 mg into the skin once a week., Disp: 4 pen , Rfl: 5    ferrous sulfate (IRON) 325 mg (65 mg iron) Tab tablet, Take 1 tablet (325 mg total) by mouth once daily., Disp: 90 tablet, Rfl: 1    FLUoxetine 40 MG capsule, Take 2 capsules (80 mg total) by mouth once daily., Disp: 60 capsule, Rfl: 0    gabapentin (NEURONTIN) 300 MG capsule, Take 3 capsules (900 mg total) by mouth 3 (three) times daily., Disp: 270 capsule, Rfl: 2    hydroCHLOROthiazide (HYDRODIURIL) 25 MG tablet, TAKE 1 TABLET BY MOUTH ONCE DAILY, Disp: 90 tablet, Rfl: 3    HYDROcodone-acetaminophen (NORCO)  mg per tablet, Take 1 tablet by mouth every 8 (eight) hours as needed for Pain., Disp: 90 tablet, Rfl: 0    ibuprofen (ADVIL,MOTRIN) 600 MG tablet, Take 1 tablet (600 mg total) by mouth every 8 (eight) hours as needed for Pain., Disp: 20 tablet, Rfl: 0    insulin detemir U-100, Levemir, (LEVEMIR FLEXPEN) 100 unit/mL (3 mL) InPn pen, Inject 11 Units into the skin every evening. Patient had low blood sugar while in-patient, decreasing dose, Disp: 6 mL, Rfl: 1    insulin lispro (HUMALOG KWIKPEN INSULIN) 100 unit/mL pen, Inject 5 Units into the skin 3 (three) times daily. (Patient not taking: Reported on 5/9/2024), Disp: 6 mL, Rfl: 2    lancets 33 gauge Misc, 1 lancet by Misc.(Non-Drug; Combo Route) route once daily., Disp: 100 each, Rfl: 3    lancing device Misc, 1 Device by Misc.(Non-Drug; Combo Route) route 2 (two) times daily with meals., Disp: 1 each, Rfl: 0    LIDOcaine (LIDODERM) 5 %, Place 1 patch onto the skin once daily. Remove & Discard patch within 12 hours or as directed by MD, Disp: 7 patch, Rfl: 0    LIDOcaine (LIDODERM) 5 %, Place 1 patch onto the skin once  "daily. Remove & Discard patch within 12 hours or as directed by MD, Disp: 7 patch, Rfl: 0    loratadine (CLARITIN) 10 mg tablet, Take 1 tablet (10 mg total) by mouth once daily., Disp: 30 tablet, Rfl: 3    losartan (COZAAR) 50 MG tablet, Take 2 tablets by mouth once daily, Disp: 180 tablet, Rfl: 3    metFORMIN (GLUCOPHAGE) 500 MG tablet, Take 2 tablets (1,000 mg total) by mouth 2 (two) times daily with meals. Needs appointment for future refills, Disp: 120 tablet, Rfl: 2    morphine (MS CONTIN) 15 MG 12 hr tablet, Take 1 tablet (15 mg total) by mouth 2 (two) times daily., Disp: 60 tablet, Rfl: 0    nortriptyline (PAMELOR) 50 MG capsule, Take 1 capsule (50 mg total) by mouth every evening., Disp: 30 capsule, Rfl: 2    OLANZapine (ZYPREXA) 5 MG tablet, Take 1 tablet (5 mg total) by mouth every evening. days 1-4 of each chemotherapy cycle., Disp: 30 tablet, Rfl: 0    ondansetron (ZOFRAN-ODT) 8 MG TbDL, Dissolve 1 tablet (8 mg total) by mouth or dissolve on tongue every 8 (eight) hours as needed (nausea/vomiting).  Let saliva dissolve tablet., Disp: 60 tablet, Rfl: 5    ondansetron (ZOFRAN-ODT) 8 MG TbDL, Take 1 tablet (8 mg total) by mouth every 8 (eight) hours as needed (nausea)., Disp: 30 tablet, Rfl: 2    pantoprazole (PROTONIX) 40 MG tablet, Take 1 tablet (40 mg total) by mouth once daily. While taking dexamethasone, Disp: 30 tablet, Rfl: 1    pen needle, diabetic (BD ULTRA-FINE TANESHA PEN NEEDLE) 32 gauge x 5/32" Ndle, Use as directed with novolog and levemir (4 times daily), Disp: 100 each, Rfl: 5    ribociclib (KISQALI) 600 mg/day (200 mg x 3) Tab, Take 600 mg by mouth once daily., Disp: 63 tablet, Rfl: 11    tadalafiL (CIALIS) 5 MG tablet, Take 2 tablets (10 mg total) by mouth daily as needed for Erectile Dysfunction. (Patient not taking: Reported on 3/26/2024), Disp: 20 tablet, Rfl: 1    OBJECTIVE:       ROS:  Review of Systems   Constitutional:  Positive for activity change, appetite change (improved) and " fatigue.   HENT: Negative.     Eyes: Negative.    Respiratory: Negative.     Cardiovascular: Negative.    Gastrointestinal: Negative.  Negative for abdominal pain, constipation, diarrhea, nausea and vomiting.   Genitourinary: Negative.    Musculoskeletal:  Positive for back pain, gait problem and myalgias.   Skin: Negative.    Neurological:         + neuropathic pain in bilateral lower extremities   Psychiatric/Behavioral:  Positive for sleep disturbance. Negative for dysphoric mood, self-injury and suicidal ideas. The patient is nervous/anxious.    All other systems reviewed and are negative.      Review of Symptoms      Symptom Assessment (ESAS 0-10 Scale)  Pain:  6  Dyspnea:  0  Anxiety:  0  Nausea:  0  Depression:  0  Anorexia:  6  Fatigue:  8  Insomnia:  5  Restlessness:  8  Agitation:  0     CAM / Delirium:  Negative  Constipation:  Negative  Diarrhea:  Negative    Anxiety:  Is nervous/anxious  Constipation:  No constipation    Bowel Management Plan (BMP):  Yes      Pain Assessment:  OME in 24 hours:  20  Location(s): leg and back    Back       Location: lower        Quality: Shooting, stabbing and throbbing        Quantity: 6/10 in intensity        Chronicity: Onset 4 month(s) ago, controlled        Aggravating Factors: Activity        Alleviating Factors: NSAIDs       Associated Symptoms: None  Leg       Location: bilateral        Quality: Tingling        Quantity: 0/10 in intensity        Chronicity: Onset 6 (greater than) month(s) ago, stable        Aggravating Factors: None        Alleviating Factors: Opiates        Associated Symptoms: None    Modified Mikal Scale:  0    ECOG Performance Status ndGndrndanddndend:nd nd2nd Living Arrangements:  Lives with spouse    Psychosocial/Cultural:   See Palliative Psychosocial Note: No  Patient lives with his wife. He has three sons (one set of twins)    One son lives at home with his 1 yo grandson.  Brings additional iliana to his life  **Primary  to  Follow**  Palliative Care  Consult: No    Spiritual:  F - Mariella and Belief:  Yes  I - Importance:  High  A - Address in Care:  Yes      Advance Care Planning   Advance Directives:   Living Will: No    LaPOST: No    Do Not Resuscitate Status: No    Medical Power of : No    Agent's Name:  Efraín Li   Agent's Contact Number:  [phone]    Decision Making:  Patient answered questions  Goals of Care: The patient endorses that what is most important right now is to focus on symptom/pain control and extending life as long as possible.    Accordingly, we have decided that the best plan to meet the patient's goals includes continuing with treatment            Physical Exam: limited due to telemedicine visit  Vitals:     There were no vitals filed for this visit.    Physical Exam  Constitutional:       General: He is not in acute distress.     Appearance: He is not ill-appearing or toxic-appearing.   HENT:      Head: Normocephalic and atraumatic.      Right Ear: External ear normal.      Left Ear: External ear normal.      Nose: Nose normal.      Mouth/Throat:      Mouth: Mucous membranes are moist.   Eyes:      General: No scleral icterus.        Right eye: No discharge.         Left eye: No discharge.   Neck:      Comments: Trachea midline  Pulmonary:      Effort: Pulmonary effort is normal. No respiratory distress.   Musculoskeletal:      Cervical back: Normal range of motion.      Comments: Sitting up without support; wife leaning against patient throughout visit; able to adjust position as needed   Skin:     Coloration: Skin is not pale.      Findings: No lesion or rash.   Neurological:      General: No focal deficit present.      Mental Status: He is alert and oriented to person, place, and time.      Cranial Nerves: No cranial nerve deficit.   Psychiatric:         Attention and Perception: Attention normal.         Mood and Affect: Affect normal.         Speech: Speech normal.          "Behavior: Behavior normal.         Thought Content: Thought content normal.         Cognition and Memory: Cognition normal.         Judgment: Judgment normal.         Labs:  CBC:   WBC   Date Value Ref Range Status   07/26/2024 8.70 3.90 - 12.70 K/uL Final     Hemoglobin   Date Value Ref Range Status   07/26/2024 7.2 (L) 14.0 - 18.0 g/dL Final     POC Hematocrit   Date Value Ref Range Status   11/07/2022 35 (L) 36 - 54 %PCV Final     Hematocrit   Date Value Ref Range Status   07/26/2024 24.1 (L) 40.0 - 54.0 % Final     MCV   Date Value Ref Range Status   07/26/2024 77 (L) 82 - 98 fL Final     Platelets   Date Value Ref Range Status   07/26/2024 376 150 - 450 K/uL Final       LFT:   Lab Results   Component Value Date    AST 44 (H) 07/26/2024    ALKPHOS 313 (H) 07/26/2024    BILITOT 1.1 (H) 07/26/2024       Albumin:   Albumin   Date Value Ref Range Status   07/26/2024 2.6 (L) 3.5 - 5.2 g/dL Final     Protein:   Total Protein   Date Value Ref Range Status   07/26/2024 7.6 6.0 - 8.4 g/dL Final       Radiology:I have reviewed all pertinent imaging results/findings within the past 24 hours.    06/17/2024 CT A/P: "Compared to NM PET-CT 05/09/2024, there is a new hepatic segment 2 hypodense hypoattenuating lesion measuring 6.7 x 4.0 cm, suggestive of progressive metastatic disease. Imaging signs of pulmonary and osseous metastatic disease similar to recent prior. Small pleural effusion on the left, minimal on the right. Mild urinary bladder wall thickening may relate to incomplete distention however correlation for UTI/cystitis is needed. Additional findings as above."    05/09/2024 NM PET CT: "Findings overall concerning for disease progression in patient with known metastatic breast cancer status post right mastectomy and right axillary lymph node dissection. Increased number of hypermetabolic mediastinal and hilar lymph nodes.  New hypermetabolic left pelvic lymph nodes. New hypermetabolic hepatic mass concerning for " "metastasis. Mixed osseous treatment response noting numerous new lesions and decreased uptake within some index lesions. Hypermetabolic focus in the left orbital apex concerning for metastasis. Additional findings as above."    This note was generated with the assistance of ambient listening technology. Verbal consent was obtained by the patient and accompanying visitor(s) for the recording of patient appointment to facilitate this note. I attest to having reviewed and edited the generated note for accuracy, though some syntax or spelling errors may persist. Please contact the author of this note for any clarification.    Signature: Angeles Rodriguez MD                  "

## 2024-07-29 NOTE — PROGRESS NOTES
"[facility]  Palliative Care   Social Work Visit      Patient Name: Paul Li Jr.  MRN: [id number]  Palliative Care Provider: Angeles Rodriguez MD   Primary Care Physician: Kareem Arroyo MD  Principal Problem:Breast cancer    SW accompanied MD during follow up with patient and spouse.  Patient presents AAOx4 with normal affect.  Patient endorses his primary complaint on this date is low sex drive.  MD to consult with Oncology regarding appropriateness of medication management.  SW explored the effect this has had on patient's marriage.  Patient acknowledges his spouse has been supportive and notes he has internalized this pressure.  Spouse adds she has worked with patient to find ways to express their love outside of intercourse.  Patient reports he is frustrated as his sexual performance was the one thing he could control. SW commended the couple on their candor and willingness to adjust to this circumstance.  SW highlighted patient's ability to maintain control in this situation by openly advocating for his needs by broaching this difficult topic with his team.      Patient also reports difficulty sleeping. Patient attributes the majority of this to restless legs though does acknowledge racing thoughts on a weekly basis.  Patient reports he becomes "stuck" on his thoughts.  SW paralleled this use of language and challenged patient to visualize  himself from these thoughts.  Patient appears amenable to implement the practices discussed.     Patient and spouse report patient recently completed advanced directives.  Patient encouraged to provide copy for his electronic chart at next visit to [facility].  Patient acknowledged understanding and denied further concerns. SW remains available to provide support; will continue to follow.    Sade Duncan LCSW-BACS    Palliative Medicine        "

## 2024-07-30 DIAGNOSIS — F32.0 CURRENT MILD EPISODE OF MAJOR DEPRESSIVE DISORDER WITHOUT PRIOR EPISODE: ICD-10-CM

## 2024-07-30 DIAGNOSIS — G62.0 CHEMOTHERAPY-INDUCED NEUROPATHY: ICD-10-CM

## 2024-07-30 DIAGNOSIS — T45.1X5A CHEMOTHERAPY-INDUCED NEUROPATHY: ICD-10-CM

## 2024-07-30 RX ORDER — FLUOXETINE HYDROCHLORIDE 40 MG/1
80 CAPSULE ORAL DAILY
Qty: 60 CAPSULE | Refills: 0 | Status: SHIPPED | OUTPATIENT
Start: 2024-07-30 | End: 2024-09-28

## 2024-07-30 RX ORDER — GABAPENTIN 300 MG/1
900 CAPSULE ORAL 3 TIMES DAILY
Qty: 270 CAPSULE | Refills: 2 | Status: SHIPPED | OUTPATIENT
Start: 2024-07-30

## 2024-08-06 ENCOUNTER — PATIENT MESSAGE (OUTPATIENT)
Dept: HEMATOLOGY/ONCOLOGY | Facility: CLINIC | Age: 49
End: 2024-08-06
Payer: MEDICARE

## 2024-08-07 ENCOUNTER — PATIENT MESSAGE (OUTPATIENT)
Dept: OTHER | Facility: OTHER | Age: 49
End: 2024-08-07
Payer: MEDICARE

## 2024-08-09 ENCOUNTER — LAB VISIT (OUTPATIENT)
Dept: LAB | Facility: HOSPITAL | Age: 49
End: 2024-08-09
Attending: INTERNAL MEDICINE
Payer: MEDICARE

## 2024-08-09 ENCOUNTER — TELEPHONE (OUTPATIENT)
Dept: HEMATOLOGY/ONCOLOGY | Facility: CLINIC | Age: 49
End: 2024-08-09
Payer: MEDICARE

## 2024-08-09 ENCOUNTER — INFUSION (OUTPATIENT)
Dept: INFUSION THERAPY | Facility: HOSPITAL | Age: 49
End: 2024-08-09
Payer: MEDICARE

## 2024-08-09 VITALS
SYSTOLIC BLOOD PRESSURE: 135 MMHG | RESPIRATION RATE: 18 BRPM | BODY MASS INDEX: 35.35 KG/M2 | HEIGHT: 73 IN | DIASTOLIC BLOOD PRESSURE: 80 MMHG | WEIGHT: 266.75 LBS | HEART RATE: 113 BPM | TEMPERATURE: 99 F

## 2024-08-09 DIAGNOSIS — C79.31 SECONDARY MALIGNANT NEOPLASM OF BRAIN: ICD-10-CM

## 2024-08-09 DIAGNOSIS — C50.021 MALIGNANT NEOPLASM INVOLVING BOTH NIPPLE AND AREOLA OF RIGHT BREAST IN MALE, ESTROGEN RECEPTOR NEGATIVE: Primary | ICD-10-CM

## 2024-08-09 DIAGNOSIS — C50.021 MALIGNANT NEOPLASM INVOLVING BOTH NIPPLE AND AREOLA OF RIGHT BREAST IN MALE, ESTROGEN RECEPTOR NEGATIVE: ICD-10-CM

## 2024-08-09 DIAGNOSIS — Z17.1 MALIGNANT NEOPLASM INVOLVING BOTH NIPPLE AND AREOLA OF RIGHT BREAST IN MALE, ESTROGEN RECEPTOR NEGATIVE: ICD-10-CM

## 2024-08-09 DIAGNOSIS — C79.51 MALIGNANT NEOPLASM METASTATIC TO BONE: Primary | ICD-10-CM

## 2024-08-09 DIAGNOSIS — C79.51 MALIGNANT NEOPLASM METASTATIC TO BONE: ICD-10-CM

## 2024-08-09 DIAGNOSIS — Z17.1 MALIGNANT NEOPLASM INVOLVING BOTH NIPPLE AND AREOLA OF RIGHT BREAST IN MALE, ESTROGEN RECEPTOR NEGATIVE: Primary | ICD-10-CM

## 2024-08-09 LAB
ALBUMIN SERPL BCP-MCNC: 2.7 G/DL (ref 3.5–5.2)
ALP SERPL-CCNC: 352 U/L (ref 55–135)
ALT SERPL W/O P-5'-P-CCNC: 30 U/L (ref 10–44)
ANION GAP SERPL CALC-SCNC: 13 MMOL/L (ref 8–16)
ANISOCYTOSIS BLD QL SMEAR: SLIGHT
AST SERPL-CCNC: 60 U/L (ref 10–40)
BASOPHILS # BLD AUTO: ABNORMAL K/UL (ref 0–0.2)
BASOPHILS NFR BLD: 0 % (ref 0–1.9)
BILIRUB SERPL-MCNC: 1.1 MG/DL (ref 0.1–1)
BUN SERPL-MCNC: 11 MG/DL (ref 6–20)
CALCIUM SERPL-MCNC: 9.5 MG/DL (ref 8.7–10.5)
CHLORIDE SERPL-SCNC: 100 MMOL/L (ref 95–110)
CO2 SERPL-SCNC: 20 MMOL/L (ref 23–29)
CREAT SERPL-MCNC: 0.8 MG/DL (ref 0.5–1.4)
DIFFERENTIAL METHOD BLD: ABNORMAL
EOSINOPHIL # BLD AUTO: ABNORMAL K/UL (ref 0–0.5)
EOSINOPHIL NFR BLD: 0 % (ref 0–8)
ERYTHROCYTE [DISTWIDTH] IN BLOOD BY AUTOMATED COUNT: 22.2 % (ref 11.5–14.5)
EST. GFR  (NO RACE VARIABLE): >60 ML/MIN/1.73 M^2
GLUCOSE SERPL-MCNC: 107 MG/DL (ref 70–110)
HCT VFR BLD AUTO: 26 % (ref 40–54)
HGB BLD-MCNC: 7.5 G/DL (ref 14–18)
HYPOCHROMIA BLD QL SMEAR: ABNORMAL
IMM GRANULOCYTES # BLD AUTO: ABNORMAL K/UL (ref 0–0.04)
IMM GRANULOCYTES NFR BLD AUTO: ABNORMAL % (ref 0–0.5)
LYMPHOCYTES # BLD AUTO: ABNORMAL K/UL (ref 1–4.8)
LYMPHOCYTES NFR BLD: 9 % (ref 18–48)
MCH RBC QN AUTO: 22.6 PG (ref 27–31)
MCHC RBC AUTO-ENTMCNC: 28.8 G/DL (ref 32–36)
MCV RBC AUTO: 78 FL (ref 82–98)
MONOCYTES # BLD AUTO: ABNORMAL K/UL (ref 0.3–1)
MONOCYTES NFR BLD: 7 % (ref 4–15)
MYELOCYTES NFR BLD MANUAL: 4 %
NEUTROPHILS NFR BLD: 80 % (ref 38–73)
NRBC BLD-RTO: 3 /100 WBC
OVALOCYTES BLD QL SMEAR: ABNORMAL
PLATELET # BLD AUTO: 389 K/UL (ref 150–450)
PLATELET BLD QL SMEAR: ABNORMAL
PMV BLD AUTO: 9.2 FL (ref 9.2–12.9)
POIKILOCYTOSIS BLD QL SMEAR: SLIGHT
POLYCHROMASIA BLD QL SMEAR: ABNORMAL
POTASSIUM SERPL-SCNC: 4.2 MMOL/L (ref 3.5–5.1)
PROT SERPL-MCNC: 7.7 G/DL (ref 6–8.4)
RBC # BLD AUTO: 3.32 M/UL (ref 4.6–6.2)
SODIUM SERPL-SCNC: 133 MMOL/L (ref 136–145)
TARGETS BLD QL SMEAR: ABNORMAL
WBC # BLD AUTO: 6.97 K/UL (ref 3.9–12.7)

## 2024-08-09 PROCEDURE — 80053 COMPREHEN METABOLIC PANEL: CPT | Performed by: INTERNAL MEDICINE

## 2024-08-09 PROCEDURE — 25000003 PHARM REV CODE 250: Performed by: NURSE PRACTITIONER

## 2024-08-09 PROCEDURE — 63600175 PHARM REV CODE 636 W HCPCS: Performed by: NURSE PRACTITIONER

## 2024-08-09 PROCEDURE — 85007 BL SMEAR W/DIFF WBC COUNT: CPT | Performed by: INTERNAL MEDICINE

## 2024-08-09 PROCEDURE — 63600175 PHARM REV CODE 636 W HCPCS: Performed by: INTERNAL MEDICINE

## 2024-08-09 PROCEDURE — 25000003 PHARM REV CODE 250: Performed by: INTERNAL MEDICINE

## 2024-08-09 PROCEDURE — A4216 STERILE WATER/SALINE, 10 ML: HCPCS | Performed by: NURSE PRACTITIONER

## 2024-08-09 PROCEDURE — 96367 TX/PROPH/DG ADDL SEQ IV INF: CPT

## 2024-08-09 PROCEDURE — 36415 COLL VENOUS BLD VENIPUNCTURE: CPT | Performed by: INTERNAL MEDICINE

## 2024-08-09 PROCEDURE — 96413 CHEMO IV INFUSION 1 HR: CPT

## 2024-08-09 PROCEDURE — 85027 COMPLETE CBC AUTOMATED: CPT | Performed by: INTERNAL MEDICINE

## 2024-08-09 RX ORDER — EPINEPHRINE 0.3 MG/.3ML
0.3 INJECTION SUBCUTANEOUS ONCE AS NEEDED
Status: DISCONTINUED | OUTPATIENT
Start: 2024-08-09 | End: 2024-08-09 | Stop reason: HOSPADM

## 2024-08-09 RX ORDER — SODIUM CHLORIDE 0.9 % (FLUSH) 0.9 %
10 SYRINGE (ML) INJECTION
Status: DISCONTINUED | OUTPATIENT
Start: 2024-08-09 | End: 2024-08-09 | Stop reason: HOSPADM

## 2024-08-09 RX ORDER — PROCHLORPERAZINE EDISYLATE 5 MG/ML
10 INJECTION INTRAMUSCULAR; INTRAVENOUS ONCE AS NEEDED
Status: DISCONTINUED | OUTPATIENT
Start: 2024-08-09 | End: 2024-08-09 | Stop reason: HOSPADM

## 2024-08-09 RX ORDER — HEPARIN 100 UNIT/ML
500 SYRINGE INTRAVENOUS
Status: DISCONTINUED | OUTPATIENT
Start: 2024-08-09 | End: 2024-08-09 | Stop reason: HOSPADM

## 2024-08-09 RX ORDER — DIPHENHYDRAMINE HYDROCHLORIDE 50 MG/ML
50 INJECTION INTRAMUSCULAR; INTRAVENOUS ONCE AS NEEDED
Status: DISCONTINUED | OUTPATIENT
Start: 2024-08-09 | End: 2024-08-09 | Stop reason: HOSPADM

## 2024-08-09 RX ADMIN — SODIUM CHLORIDE: 9 INJECTION, SOLUTION INTRAVENOUS at 01:08

## 2024-08-09 RX ADMIN — Medication 10 ML: at 03:08

## 2024-08-09 RX ADMIN — DEXAMETHASONE SODIUM PHOSPHATE 10 MG: 4 INJECTION, SOLUTION INTRA-ARTICULAR; INTRALESIONAL; INTRAMUSCULAR; INTRAVENOUS; SOFT TISSUE at 01:08

## 2024-08-09 RX ADMIN — GEMCITABINE 2105 MG: 38 INJECTION, SOLUTION INTRAVENOUS at 02:08

## 2024-08-09 RX ADMIN — SODIUM CHLORIDE 1000 ML: 9 INJECTION, SOLUTION INTRAVENOUS at 01:08

## 2024-08-09 RX ADMIN — HEPARIN 500 UNITS: 100 SYRINGE at 03:08

## 2024-08-09 NOTE — PROGRESS NOTES
Abdomen , soft, nontender, nondistended , no guarding or rigidity , no masses palpable , normal bowel sounds , Liver and Spleen , no hepatomegaly present , no hepatosplenomegaly , liver nontender , spleen not palpable  PEG site- INTACT PEG The patient location is: [place]  The chief complaint leading to consultation is: hot flashes, appetite change    Visit type: audiovisual    Face to Face time with patient: 28 minutes   40 minutes of total time spent on the encounter, which includes face to face time and non-face to face time preparing to see the patient (eg, review of tests), Obtaining and/or reviewing separately obtained history, Documenting clinical information in the electronic or other health record, Independently interpreting results (not separately reported) and communicating results to the patient/family/caregiver, or Care coordination (not separately reported).     Each patient to whom he or she provides medical services by telemedicine is:  (1) informed of the relationship between the physician and patient and the respective role of any other health care provider with respect to management of the patient; and (2) notified that he or she may decline to receive medical services by telemedicine and may withdraw from such care at any time.    Oncology Nutrition Assessment for Medical Nutrition Therapy  Initial Visit    Paul Clintonshilpa Michael   1975    Referring Provider: Rosa Linn MD     Reason for Visit: Nutrition counseling and education     PMHx:   Past Medical History:   Diagnosis Date    Breast cancer     Cancer     R breast    Diabetes mellitus     HTN (hypertension)     Leg edema, right     Prediabetes     Seizures     at age 18 - stress related    Therapy     marital counseling       Nutrition Assessment    Patient is a 48 y.o.male with metastatic breast cancer. Currently being treated with OP BREAST GEMCITABINE QW. PMH includes DM2, HTN.   He reports concerns related to hot flashes and appetite changes. His appetite is up and down since starting chemotherapy. Had some weight loss about 3 months ago, but now more stable. Reports he eats twice per day, some snacks. This is normal for him but portions are smaller than previously.  "He does have Boost Glucose Control and uses this at least once per day (usually for breakfast replacement).   Hot flashes are every other day or so. Very random timing, no noticeable pattern.   He drinks 2-3 20oz water bottles per day, some Gatorade zero, some coke zero. 1 cup of coffee per day. Very occasional alcohol. He is very careful about added sugar intake.   His wife and father cook. He is interested in the food pantry.     Weight:125.2 kg (276 lb)  Height:6' 1" (1.854 m)  BMI:Body mass index is 36.41 kg/m².   IBW: Ideal body weight: 79.9 kg (176 lb 2.4 oz)  Adjusted ideal body weight: 98 kg (216 lb 1.4 oz)    Allergies: Patient has no known allergies.    Current Medications:    Current Outpatient Medications:     ALPRAZolam (XANAX) 0.5 MG tablet, Take 1 tablet (0.5 mg total) by mouth 3 (three) times daily as needed for Insomnia or Anxiety., Disp: 60 tablet, Rfl: 0    amLODIPine (NORVASC) 10 MG tablet, TAKE 1 TABLET (10 MG) BY MOUTH EVERY DAY, Disp: 90 tablet, Rfl: 3    bisacodyL (DULCOLAX) 5 mg EC tablet, Take 1 tablet (5 mg total) by mouth daily as needed for Constipation., Disp: 30 tablet, Rfl: 1    calcium-vitamin D3 (OYSTER SHELL CALCIUM-VIT D3) 500 mg-5 mcg (200 unit) per tablet, Take 1 tablet by mouth 2 (two) times daily., Disp: 180 tablet, Rfl: 3    diphenoxylate-atropine 2.5-0.025 mg (LOMOTIL) 2.5-0.025 mg per tablet, Take 1 tablet by mouth 4 (four) times daily as needed for Diarrhea., Disp: 60 tablet, Rfl: 2    dulaglutide (TRULICITY) 1.5 mg/0.5 mL pen injector, Inject 1.5 mg into the skin once a week., Disp: 4 pen , Rfl: 5    ferrous sulfate (IRON) 325 mg (65 mg iron) Tab tablet, Take 1 tablet (325 mg total) by mouth once daily., Disp: 90 tablet, Rfl: 1    FLUoxetine 40 MG capsule, Take 2 capsules (80 mg total) by mouth once daily., Disp: 60 capsule, Rfl: 0    gabapentin (NEURONTIN) 300 MG capsule, Take 3 capsules (900 mg total) by mouth 3 (three) times daily., Disp: 270 capsule, Rfl: 2    " hydroCHLOROthiazide (HYDRODIURIL) 25 MG tablet, TAKE 1 TABLET BY MOUTH ONCE DAILY, Disp: 90 tablet, Rfl: 3    HYDROcodone-acetaminophen (NORCO)  mg per tablet, Take 1 tablet by mouth every 8 (eight) hours as needed for Pain., Disp: 90 tablet, Rfl: 0    ibuprofen (ADVIL,MOTRIN) 600 MG tablet, Take 1 tablet (600 mg total) by mouth every 8 (eight) hours as needed for Pain., Disp: 20 tablet, Rfl: 0    insulin detemir U-100, Levemir, (LEVEMIR FLEXPEN) 100 unit/mL (3 mL) InPn pen, Inject 11 Units into the skin every evening. Patient had low blood sugar while in-patient, decreasing dose, Disp: 6 mL, Rfl: 1    insulin lispro (HUMALOG KWIKPEN INSULIN) 100 unit/mL pen, Inject 5 Units into the skin 3 (three) times daily. (Patient not taking: Reported on 5/9/2024), Disp: 6 mL, Rfl: 2    lancets 33 gauge Misc, 1 lancet by Misc.(Non-Drug; Combo Route) route once daily., Disp: 100 each, Rfl: 3    lancing device Misc, 1 Device by Misc.(Non-Drug; Combo Route) route 2 (two) times daily with meals., Disp: 1 each, Rfl: 0    LIDOcaine (LIDODERM) 5 %, Place 1 patch onto the skin once daily. Remove & Discard patch within 12 hours or as directed by MD, Disp: 7 patch, Rfl: 0    LIDOcaine (LIDODERM) 5 %, Place 1 patch onto the skin once daily. Remove & Discard patch within 12 hours or as directed by MD, Disp: 7 patch, Rfl: 0    loratadine (CLARITIN) 10 mg tablet, Take 1 tablet (10 mg total) by mouth once daily., Disp: 30 tablet, Rfl: 3    losartan (COZAAR) 50 MG tablet, Take 2 tablets by mouth once daily, Disp: 180 tablet, Rfl: 3    metFORMIN (GLUCOPHAGE) 500 MG tablet, Take 2 tablets (1,000 mg total) by mouth 2 (two) times daily with meals. Needs appointment for future refills, Disp: 120 tablet, Rfl: 2    morphine (MS CONTIN) 15 MG 12 hr tablet, Take 1 tablet (15 mg total) by mouth 2 (two) times daily., Disp: 60 tablet, Rfl: 0    nortriptyline (PAMELOR) 50 MG capsule, Take 1 capsule (50 mg total) by mouth every evening., Disp: 30  "capsule, Rfl: 2    OLANZapine (ZYPREXA) 5 MG tablet, Take 1 tablet (5 mg total) by mouth every evening. days 1-4 of each chemotherapy cycle., Disp: 30 tablet, Rfl: 0    ondansetron (ZOFRAN-ODT) 8 MG TbDL, Dissolve 1 tablet (8 mg total) by mouth or dissolve on tongue every 8 (eight) hours as needed (nausea/vomiting).  Let saliva dissolve tablet., Disp: 60 tablet, Rfl: 5    ondansetron (ZOFRAN-ODT) 8 MG TbDL, Take 1 tablet (8 mg total) by mouth every 8 (eight) hours as needed (nausea)., Disp: 30 tablet, Rfl: 2    pantoprazole (PROTONIX) 40 MG tablet, Take 1 tablet (40 mg total) by mouth once daily. While taking dexamethasone, Disp: 30 tablet, Rfl: 1    pen needle, diabetic (BD ULTRA-FINE TANESHA PEN NEEDLE) 32 gauge x 5/32" Ndle, Use as directed with novolog and levemir (4 times daily), Disp: 100 each, Rfl: 5    ribociclib (KISQALI) 600 mg/day (200 mg x 3) Tab, Take 600 mg by mouth once daily., Disp: 63 tablet, Rfl: 11    tadalafiL (CIALIS) 5 MG tablet, Take 2 tablets (10 mg total) by mouth daily as needed for Erectile Dysfunction. (Patient not taking: Reported on 3/26/2024), Disp: 20 tablet, Rfl: 1    Vitamins/Supplements: occasional vitamin C    Labs: Reviewed from 7/9/24- Albumin 2.9    Hemoglobin A1C   Date Value Ref Range Status   11/29/2023 5.6 4.0 - 5.6 % Final     Comment:     ADA Screening Guidelines:  5.7-6.4%  Consistent with prediabetes  >or=6.5%  Consistent with diabetes    High levels of fetal hemoglobin interfere with the HbA1C  assay. Heterozygous hemoglobin variants (HbS, HgC, etc)do  not significantly interfere with this assay.   However, presence of multiple variants may affect accuracy.     06/08/2023 5.6 4.0 - 5.6 % Final     Comment:     ADA Screening Guidelines:  5.7-6.4%  Consistent with prediabetes  >or=6.5%  Consistent with diabetes    High levels of fetal hemoglobin interfere with the HbA1C  assay. Heterozygous hemoglobin variants (HbS, HgC, etc)do  not significantly interfere with this assay. "   However, presence of multiple variants may affect accuracy.     12/20/2022 6.7 (H) 4.0 - 5.6 % Final     Comment:     ADA Screening Guidelines:  5.7-6.4%  Consistent with prediabetes  >or=6.5%  Consistent with diabetes    High levels of fetal hemoglobin interfere with the HbA1C  assay. Heterozygous hemoglobin variants (HbS, HgC, etc)do  not significantly interfere with this assay.   However, presence of multiple variants may affect accuracy.           Nutrition Diagnosis    Problem: Nutrition knowledge deficit   Etiology (related to): lack of prior need for nutrition education  Signs/Symptoms (as evidenced by): self reported diet questions/concerns  and new hot flashes, taste changes, appetite changes    Nutrition Intervention    Nutrition Prescription   1900-250 Kcals (15-20kcal/kg)  100-125 g protein (0.8-1g/kg)   3100 mL fluid (25mL/kg)    Recommendations:  For hot flashes:   Minimize triggers like caffeine alcohol, hot spices/peppers, red meat  Magnesium glycinate 200mg nightly   Refer to acupuncture   For appetite changes:   Consistently timed meal   Avoid skipping meals (aim for 3-4 small meals daily)  High protein and calorie foods that are low sugar/low glycemic  -Greek yogurt, nuts, trail mix, peanut butter, low sugar granola, Perfect Bars, etc.     Signed patient up for food pantry and explained process for use    Nutrition Monitoring and Evaluation    Monitor: energy intake, diet tolerance , and diet education needs       Goals: reduce frequency of hot flashes, maintain weight    Follow up Patient will follow up via portal as needed with any questions/concerns     Communication to referring provider/care team: note available in chart     Counseling time: 30 Minutes    Nelda Kearney, MPH, RD, , LDN, FAND  Board Certified Specialist in Oncology Nutrition   [phone]

## 2024-08-13 ENCOUNTER — LAB VISIT (OUTPATIENT)
Dept: LAB | Facility: HOSPITAL | Age: 49
End: 2024-08-13
Attending: INTERNAL MEDICINE
Payer: MEDICARE

## 2024-08-13 DIAGNOSIS — C79.31 SECONDARY MALIGNANT NEOPLASM OF BRAIN: ICD-10-CM

## 2024-08-13 DIAGNOSIS — C79.51 MALIGNANT NEOPLASM METASTATIC TO BONE: ICD-10-CM

## 2024-08-13 DIAGNOSIS — Z17.1 MALIGNANT NEOPLASM INVOLVING BOTH NIPPLE AND AREOLA OF RIGHT BREAST IN MALE, ESTROGEN RECEPTOR NEGATIVE: ICD-10-CM

## 2024-08-13 DIAGNOSIS — C50.021 MALIGNANT NEOPLASM INVOLVING BOTH NIPPLE AND AREOLA OF RIGHT BREAST IN MALE, ESTROGEN RECEPTOR NEGATIVE: ICD-10-CM

## 2024-08-13 LAB
ABO + RH BLD: NORMAL
ALBUMIN SERPL BCP-MCNC: 2.6 G/DL (ref 3.5–5.2)
ALP SERPL-CCNC: 334 U/L (ref 55–135)
ALT SERPL W/O P-5'-P-CCNC: 60 U/L (ref 10–44)
ANION GAP SERPL CALC-SCNC: 11 MMOL/L (ref 8–16)
AST SERPL-CCNC: 82 U/L (ref 10–40)
BASOPHILS # BLD AUTO: 0.02 K/UL (ref 0–0.2)
BASOPHILS NFR BLD: 0.5 % (ref 0–1.9)
BILIRUB SERPL-MCNC: 0.7 MG/DL (ref 0.1–1)
BLD GP AB SCN CELLS X3 SERPL QL: NORMAL
BUN SERPL-MCNC: 11 MG/DL (ref 6–20)
CALCIUM SERPL-MCNC: 9.3 MG/DL (ref 8.7–10.5)
CHLORIDE SERPL-SCNC: 102 MMOL/L (ref 95–110)
CO2 SERPL-SCNC: 23 MMOL/L (ref 23–29)
CREAT SERPL-MCNC: 0.7 MG/DL (ref 0.5–1.4)
DIFFERENTIAL METHOD BLD: ABNORMAL
EOSINOPHIL # BLD AUTO: 0 K/UL (ref 0–0.5)
EOSINOPHIL NFR BLD: 0.8 % (ref 0–8)
ERYTHROCYTE [DISTWIDTH] IN BLOOD BY AUTOMATED COUNT: 22.5 % (ref 11.5–14.5)
EST. GFR  (NO RACE VARIABLE): >60 ML/MIN/1.73 M^2
GLUCOSE SERPL-MCNC: 95 MG/DL (ref 70–110)
HCT VFR BLD AUTO: 23.5 % (ref 40–54)
HGB BLD-MCNC: 7 G/DL (ref 14–18)
IMM GRANULOCYTES # BLD AUTO: 0.17 K/UL (ref 0–0.04)
IMM GRANULOCYTES NFR BLD AUTO: 4.5 % (ref 0–0.5)
LYMPHOCYTES # BLD AUTO: 0.7 K/UL (ref 1–4.8)
LYMPHOCYTES NFR BLD: 17.9 % (ref 18–48)
MCH RBC QN AUTO: 23.1 PG (ref 27–31)
MCHC RBC AUTO-ENTMCNC: 29.8 G/DL (ref 32–36)
MCV RBC AUTO: 78 FL (ref 82–98)
MONOCYTES # BLD AUTO: 0.1 K/UL (ref 0.3–1)
MONOCYTES NFR BLD: 2.6 % (ref 4–15)
NEUTROPHILS # BLD AUTO: 2.8 K/UL (ref 1.8–7.7)
NEUTROPHILS NFR BLD: 73.7 % (ref 38–73)
NRBC BLD-RTO: 1 /100 WBC
PLATELET # BLD AUTO: 357 K/UL (ref 150–450)
PMV BLD AUTO: 9.7 FL (ref 9.2–12.9)
POTASSIUM SERPL-SCNC: 3.9 MMOL/L (ref 3.5–5.1)
PROT SERPL-MCNC: 7.5 G/DL (ref 6–8.4)
RBC # BLD AUTO: 3.03 M/UL (ref 4.6–6.2)
SODIUM SERPL-SCNC: 136 MMOL/L (ref 136–145)
SPECIMEN OUTDATE: NORMAL
WBC # BLD AUTO: 3.79 K/UL (ref 3.9–12.7)

## 2024-08-13 PROCEDURE — 85025 COMPLETE CBC W/AUTO DIFF WBC: CPT | Performed by: INTERNAL MEDICINE

## 2024-08-13 PROCEDURE — 86850 RBC ANTIBODY SCREEN: CPT | Performed by: INTERNAL MEDICINE

## 2024-08-13 PROCEDURE — 86901 BLOOD TYPING SEROLOGIC RH(D): CPT | Performed by: INTERNAL MEDICINE

## 2024-08-13 PROCEDURE — 80053 COMPREHEN METABOLIC PANEL: CPT | Performed by: INTERNAL MEDICINE

## 2024-08-13 PROCEDURE — 86900 BLOOD TYPING SEROLOGIC ABO: CPT | Performed by: INTERNAL MEDICINE

## 2024-08-13 PROCEDURE — 36415 COLL VENOUS BLD VENIPUNCTURE: CPT | Performed by: INTERNAL MEDICINE

## 2024-08-23 ENCOUNTER — HOSPITAL ENCOUNTER (INPATIENT)
Facility: HOSPITAL | Age: 49
LOS: 1 days | Discharge: HOSPICE | DRG: 598 | End: 2024-08-24
Attending: EMERGENCY MEDICINE | Admitting: INTERNAL MEDICINE
Payer: MEDICARE

## 2024-08-23 ENCOUNTER — PATIENT MESSAGE (OUTPATIENT)
Dept: HEMATOLOGY/ONCOLOGY | Facility: CLINIC | Age: 49
End: 2024-08-23
Payer: MEDICARE

## 2024-08-23 DIAGNOSIS — D64.9 SYMPTOMATIC ANEMIA: Primary | ICD-10-CM

## 2024-08-23 DIAGNOSIS — C79.51 METASTATIC CANCER TO BONE: ICD-10-CM

## 2024-08-23 DIAGNOSIS — R53.83 FATIGUE: ICD-10-CM

## 2024-08-23 DIAGNOSIS — D64.9 ANEMIA, UNSPECIFIED TYPE: ICD-10-CM

## 2024-08-23 DIAGNOSIS — R07.9 CHEST PAIN: ICD-10-CM

## 2024-08-23 LAB
ABO + RH BLD: NORMAL
ALBUMIN SERPL BCP-MCNC: 2.5 G/DL (ref 3.5–5.2)
ALP SERPL-CCNC: 322 U/L (ref 55–135)
ALT SERPL W/O P-5'-P-CCNC: 30 U/L (ref 10–44)
ANION GAP SERPL CALC-SCNC: 11 MMOL/L (ref 8–16)
ANISOCYTOSIS BLD QL SMEAR: SLIGHT
AST SERPL-CCNC: 78 U/L (ref 10–40)
BASOPHILS NFR BLD: 0 % (ref 0–1.9)
BILIRUB SERPL-MCNC: 0.9 MG/DL (ref 0.1–1)
BLD GP AB SCN CELLS X3 SERPL QL: NORMAL
BLD PROD TYP BPU: NORMAL
BLOOD UNIT EXPIRATION DATE: NORMAL
BLOOD UNIT TYPE CODE: 6200
BLOOD UNIT TYPE: NORMAL
BUN SERPL-MCNC: 14 MG/DL (ref 6–20)
CALCIUM SERPL-MCNC: 9.4 MG/DL (ref 8.7–10.5)
CHLORIDE SERPL-SCNC: 101 MMOL/L (ref 95–110)
CO2 SERPL-SCNC: 24 MMOL/L (ref 23–29)
CODING SYSTEM: NORMAL
CREAT SERPL-MCNC: 0.7 MG/DL (ref 0.5–1.4)
CROSSMATCH INTERPRETATION: NORMAL
DIFFERENTIAL METHOD BLD: ABNORMAL
DISPENSE STATUS: NORMAL
EOSINOPHIL NFR BLD: 0 % (ref 0–8)
ERYTHROCYTE [DISTWIDTH] IN BLOOD BY AUTOMATED COUNT: 22.9 % (ref 11.5–14.5)
EST. GFR  (NO RACE VARIABLE): >60 ML/MIN/1.73 M^2
GLUCOSE SERPL-MCNC: 124 MG/DL (ref 70–110)
HCT VFR BLD AUTO: 21.5 % (ref 40–54)
HGB BLD-MCNC: 6.1 G/DL (ref 14–18)
HYPOCHROMIA BLD QL SMEAR: ABNORMAL
IMM GRANULOCYTES # BLD AUTO: ABNORMAL K/UL (ref 0–0.04)
IMM GRANULOCYTES NFR BLD AUTO: ABNORMAL % (ref 0–0.5)
LYMPHOCYTES NFR BLD: 12 % (ref 18–48)
MCH RBC QN AUTO: 22.1 PG (ref 27–31)
MCHC RBC AUTO-ENTMCNC: 28.4 G/DL (ref 32–36)
MCV RBC AUTO: 78 FL (ref 82–98)
METAMYELOCYTES NFR BLD MANUAL: 4 %
MONOCYTES NFR BLD: 9 % (ref 4–15)
MYELOCYTES NFR BLD MANUAL: 4 %
NEUTROPHILS NFR BLD: 67 % (ref 38–73)
NEUTS BAND NFR BLD MANUAL: 4 %
NRBC BLD-RTO: 6 /100 WBC
NUM UNITS TRANS PACKED RBC: NORMAL
OHS QRS DURATION: 84 MS
OHS QTC CALCULATION: 444 MS
OVALOCYTES BLD QL SMEAR: ABNORMAL
PLATELET # BLD AUTO: 291 K/UL (ref 150–450)
PLATELET BLD QL SMEAR: ABNORMAL
PMV BLD AUTO: 9.1 FL (ref 9.2–12.9)
POCT GLUCOSE: 98 MG/DL (ref 70–110)
POIKILOCYTOSIS BLD QL SMEAR: SLIGHT
POLYCHROMASIA BLD QL SMEAR: ABNORMAL
POTASSIUM SERPL-SCNC: 3.9 MMOL/L (ref 3.5–5.1)
PROT SERPL-MCNC: 7.1 G/DL (ref 6–8.4)
RBC # BLD AUTO: 2.76 M/UL (ref 4.6–6.2)
SCHISTOCYTES BLD QL SMEAR: PRESENT
SODIUM SERPL-SCNC: 136 MMOL/L (ref 136–145)
SPECIMEN OUTDATE: NORMAL
SPHEROCYTES BLD QL SMEAR: ABNORMAL
WBC # BLD AUTO: 6.41 K/UL (ref 3.9–12.7)

## 2024-08-23 PROCEDURE — 86922 COMPATIBILITY TEST ANTIGLOB: CPT

## 2024-08-23 PROCEDURE — 85007 BL SMEAR W/DIFF WBC COUNT: CPT

## 2024-08-23 PROCEDURE — 99236 HOSP IP/OBS SAME DATE HI 85: CPT | Mod: GC,,, | Performed by: INTERNAL MEDICINE

## 2024-08-23 PROCEDURE — 93010 ELECTROCARDIOGRAM REPORT: CPT | Mod: ,,, | Performed by: INTERNAL MEDICINE

## 2024-08-23 PROCEDURE — 25000003 PHARM REV CODE 250: Performed by: STUDENT IN AN ORGANIZED HEALTH CARE EDUCATION/TRAINING PROGRAM

## 2024-08-23 PROCEDURE — 36430 TRANSFUSION BLD/BLD COMPNT: CPT

## 2024-08-23 PROCEDURE — 30233N1 TRANSFUSION OF NONAUTOLOGOUS RED BLOOD CELLS INTO PERIPHERAL VEIN, PERCUTANEOUS APPROACH: ICD-10-PCS | Performed by: EMERGENCY MEDICINE

## 2024-08-23 PROCEDURE — 20600001 HC STEP DOWN PRIVATE ROOM

## 2024-08-23 PROCEDURE — 99285 EMERGENCY DEPT VISIT HI MDM: CPT | Mod: 25

## 2024-08-23 PROCEDURE — 86902 BLOOD TYPE ANTIGEN DONOR EA: CPT

## 2024-08-23 PROCEDURE — P9040 RBC LEUKOREDUCED IRRADIATED: HCPCS

## 2024-08-23 PROCEDURE — 86900 BLOOD TYPING SEROLOGIC ABO: CPT

## 2024-08-23 PROCEDURE — 93005 ELECTROCARDIOGRAM TRACING: CPT

## 2024-08-23 PROCEDURE — 86850 RBC ANTIBODY SCREEN: CPT

## 2024-08-23 PROCEDURE — 85027 COMPLETE CBC AUTOMATED: CPT

## 2024-08-23 PROCEDURE — 86901 BLOOD TYPING SEROLOGIC RH(D): CPT

## 2024-08-23 PROCEDURE — 80053 COMPREHEN METABOLIC PANEL: CPT

## 2024-08-23 PROCEDURE — 25000003 PHARM REV CODE 250

## 2024-08-23 RX ORDER — MORPHINE SULFATE 15 MG/1
15 TABLET, FILM COATED, EXTENDED RELEASE ORAL 2 TIMES DAILY
Status: DISCONTINUED | OUTPATIENT
Start: 2024-08-23 | End: 2024-08-24 | Stop reason: HOSPADM

## 2024-08-23 RX ORDER — ALPRAZOLAM 0.5 MG/1
1 TABLET ORAL 3 TIMES DAILY PRN
Status: DISCONTINUED | OUTPATIENT
Start: 2024-08-23 | End: 2024-08-24 | Stop reason: HOSPADM

## 2024-08-23 RX ORDER — SODIUM CHLORIDE 0.9 % (FLUSH) 0.9 %
10 SYRINGE (ML) INJECTION EVERY 12 HOURS PRN
Status: DISCONTINUED | OUTPATIENT
Start: 2024-08-23 | End: 2024-08-24 | Stop reason: HOSPADM

## 2024-08-23 RX ORDER — INSULIN ASPART 100 [IU]/ML
0-5 INJECTION, SOLUTION INTRAVENOUS; SUBCUTANEOUS
Status: DISCONTINUED | OUTPATIENT
Start: 2024-08-23 | End: 2024-08-24 | Stop reason: HOSPADM

## 2024-08-23 RX ORDER — ROPINIROLE 0.25 MG/1
0.25 TABLET, FILM COATED ORAL NIGHTLY
Status: DISCONTINUED | OUTPATIENT
Start: 2024-08-23 | End: 2024-08-24 | Stop reason: HOSPADM

## 2024-08-23 RX ORDER — FLUOXETINE HYDROCHLORIDE 20 MG/1
80 CAPSULE ORAL DAILY
Status: DISCONTINUED | OUTPATIENT
Start: 2024-08-23 | End: 2024-08-24 | Stop reason: HOSPADM

## 2024-08-23 RX ORDER — HYDROCODONE BITARTRATE AND ACETAMINOPHEN 10; 325 MG/1; MG/1
1 TABLET ORAL EVERY 8 HOURS PRN
Status: DISCONTINUED | OUTPATIENT
Start: 2024-08-23 | End: 2024-08-24 | Stop reason: HOSPADM

## 2024-08-23 RX ORDER — MORPHINE SULFATE 15 MG/1
15 TABLET, FILM COATED, EXTENDED RELEASE ORAL 2 TIMES DAILY
Status: DISCONTINUED | OUTPATIENT
Start: 2024-08-23 | End: 2024-08-23

## 2024-08-23 RX ORDER — FLUOXETINE HYDROCHLORIDE 20 MG/1
80 CAPSULE ORAL DAILY
Status: DISCONTINUED | OUTPATIENT
Start: 2024-08-24 | End: 2024-08-23

## 2024-08-23 RX ORDER — GLUCAGON 1 MG
1 KIT INJECTION
Status: DISCONTINUED | OUTPATIENT
Start: 2024-08-23 | End: 2024-08-24 | Stop reason: HOSPADM

## 2024-08-23 RX ORDER — IBUPROFEN 200 MG
24 TABLET ORAL
Status: DISCONTINUED | OUTPATIENT
Start: 2024-08-23 | End: 2024-08-24 | Stop reason: HOSPADM

## 2024-08-23 RX ORDER — HYDROCODONE BITARTRATE AND ACETAMINOPHEN 500; 5 MG/1; MG/1
TABLET ORAL
Status: DISCONTINUED | OUTPATIENT
Start: 2024-08-23 | End: 2024-08-24 | Stop reason: HOSPADM

## 2024-08-23 RX ORDER — GABAPENTIN 300 MG/1
900 CAPSULE ORAL 3 TIMES DAILY
Status: DISCONTINUED | OUTPATIENT
Start: 2024-08-23 | End: 2024-08-24 | Stop reason: HOSPADM

## 2024-08-23 RX ORDER — IBUPROFEN 600 MG/1
600 TABLET ORAL EVERY 8 HOURS PRN
Status: DISCONTINUED | OUTPATIENT
Start: 2024-08-23 | End: 2024-08-24 | Stop reason: HOSPADM

## 2024-08-23 RX ORDER — NALOXONE HCL 0.4 MG/ML
0.02 VIAL (ML) INJECTION
Status: DISCONTINUED | OUTPATIENT
Start: 2024-08-23 | End: 2024-08-24 | Stop reason: HOSPADM

## 2024-08-23 RX ORDER — IBUPROFEN 200 MG
16 TABLET ORAL
Status: DISCONTINUED | OUTPATIENT
Start: 2024-08-23 | End: 2024-08-24 | Stop reason: HOSPADM

## 2024-08-23 RX ADMIN — MORPHINE SULFATE 15 MG: 15 TABLET, EXTENDED RELEASE ORAL at 05:08

## 2024-08-23 RX ADMIN — ALPRAZOLAM 1 MG: 0.5 TABLET ORAL at 10:08

## 2024-08-23 RX ADMIN — ROPINIROLE HYDROCHLORIDE 0.25 MG: 0.25 TABLET, FILM COATED ORAL at 09:08

## 2024-08-23 RX ADMIN — GABAPENTIN 900 MG: 300 CAPSULE ORAL at 09:08

## 2024-08-23 RX ADMIN — FLUOXETINE HYDROCHLORIDE 80 MG: 20 CAPSULE ORAL at 05:08

## 2024-08-23 RX ADMIN — IBUPROFEN 600 MG: 600 TABLET, FILM COATED ORAL at 10:08

## 2024-08-23 NOTE — PLAN OF CARE
Pt with Metastatic TNBC Right breast cancer with mets to bone. Pt and spouse involved in plan of care and communicating needs throughout shift. Up in room and to bathroom independently urinal at bedside.  no c/o pain. Tolerating diet at this time, voiding without difficulty. Pt receiving PRBC at this time. All VSS.  Pt remaining free from falls or injury throughout shift; bed locked and in lowest position; call light within reach.  Pt instructed to call for assistance as needed.  Q1H rounding completed on pt.

## 2024-08-23 NOTE — ED PROVIDER NOTES
Encounter Date: 8/23/2024       History     Chief Complaint   Patient presents with    Abnormal labs     Sent from [facility], on chemo, mets breast cancer, low H&H needs blood transfusion +fatigue     Anemia     47 y/o M with Rt-sided breast cancer w/ mets to the bone (on chemo), T2DM, HTN presents to the ED from the [facility] for symptomatic anemia. Pt reports that he was slated to receive chemo today, however he did not because his labs revealed a low hemoglobin for which he was directed to the ED for a blood transfusion. Pt currently c/o SOB and fatigue. Pt denies any black/bloody stools, hematuria, nausea, vomiting, or any other sxs at this time.         Review of patient's allergies indicates:  No Known Allergies  Past Medical History:   Diagnosis Date    Breast cancer     Cancer     R breast    Diabetes mellitus     HTN (hypertension)     Leg edema, right     Prediabetes     Seizures     at age 18 - stress related    Therapy     marital counseling     Past Surgical History:   Procedure Laterality Date    AXILLARY NODE DISSECTION Right 11/22/2019    Procedure: LYMPHADENECTOMY, AXILLARY RIGHT;  Surgeon: Shima Whyte MD;  [facility];  Service: General;  Laterality: Right;    AXILLARY NODE DISSECTION Right 12/17/2019    Procedure: LYMPHADENECTOMY, AXILLARY;  Surgeon: Shima Whyte MD;  Location: [address];  Service: General;  Laterality: Right;    BREAST BIOPSY      EXCISION OF HYDROCELE Right 10/28/2022    Procedure: HYDROCELECTOMY;  Surgeon: Anthony Cordero Jr., MD;  Location: [facility] OR [address] [place] [address];  Service: Urology;  Laterality: Right;  1 hour    INSERTION OF TUNNELED CENTRAL VENOUS CATHETER (CVC) WITH SUBCUTANEOUS PORT Left 5/24/2019    Procedure: UJIEKRYEO-KAKY-G-CATH;  Surgeon: Shima Whyte MD;  Location: [address];  Service: General;  Laterality: Left;    INSERTION OF TUNNELED CENTRAL VENOUS CATHETER (CVC) WITH SUBCUTANEOUS PORT Left 9/17/2021    Procedure: INSERTION,  SINGLE LUMEN CATHETER WITH PORT, WITH FLUOROSCOPIC GUIDANCE, right;  Surgeon: Gloria Holliday MD;  Location: [facility] OR 2ND FLR;  Service: General;  Laterality: Left;  rapid covid test    SENTINEL LYMPH NODE BIOPSY Right 2019    Procedure: BIOPSY, LYMPH NODE, SENTINEL RIGHT;  Surgeon: Shima Whyte MD;  Location: [facility];  Service: General;  Laterality: Right;    SIMPLE MASTECTOMY Right 2019    Procedure: MASTECTOMY, SIMPLE RIGHT (CONSENT AM OF) 2.5 hr case;  Surgeon: Shima Whyte MD;  Location: [facility];  Service: General;  Laterality: Right;     Family History   Problem Relation Name Age of Onset    Hypertension Mother      Diabetes Mother      Hypertension Father      Lupus Father      Post-traumatic stress disorder Brother      No Known Problems Maternal Grandmother      Colon cancer Maternal Grandfather      Breast cancer Paternal Grandmother      No Known Problems Paternal Grandfather      No Known Problems Sister      No Known Problems Maternal Aunt      No Known Problems Maternal Uncle      Fibromyalgia Paternal Aunt      Lupus Paternal Aunt      No Known Problems Paternal Uncle      No Known Problems Brother      No Known Problems Sister      No Known Problems Son      No Known Problems Son      No Known Problems Son      No Known Problems Son       Social History     Tobacco Use    Smoking status: Former     Current packs/day: 0.00     Average packs/day: 0.3 packs/day for 15.0 years (3.8 ttl pk-yrs)     Types: Cigarettes, Vaping with nicotine     Start date: 1999     Quit date: 2014     Years since quittin.7    Smokeless tobacco: Never    Tobacco comments:     Social smoker with alcohol    Substance Use Topics    Alcohol use: Yes     Alcohol/week: 0.0 standard drinks of alcohol     Comment: Rarely    Drug use: Not Currently     Types: Marijuana     Review of Systems    Physical Exam     Initial Vitals [24 1356]   BP Pulse Resp Temp SpO2   135/79 (!) 116 18 99 °F (37.2  °C) 98 %      MAP       --         Physical Exam    Constitutional: He is not diaphoretic. No distress.   HENT:   Head: Normocephalic and atraumatic.   Eyes: EOM are normal. Right eye exhibits no discharge. Left eye exhibits no discharge.   Neck:   Normal range of motion.  Cardiovascular:    Tachycardia present.         No murmur heard.  Pulmonary/Chest: No respiratory distress. He has no wheezes. He has no rales.   Abdominal: Bowel sounds are normal. He exhibits no distension. There is no abdominal tenderness. There is no rebound.   Musculoskeletal:         General: No edema.      Cervical back: Normal range of motion.     Neurological: He is alert and oriented to person, place, and time.   Skin: Skin is warm and dry.         ED Course   Procedures  Labs Reviewed   COMPREHENSIVE METABOLIC PANEL - Abnormal       Result Value    Sodium 136      Potassium 3.9      Chloride 101      CO2 24      Glucose 124 (*)     BUN 14      Creatinine 0.7      Calcium 9.4      Total Protein 7.1      Albumin 2.5 (*)     Total Bilirubin 0.9      Alkaline Phosphatase 322 (*)     AST 78 (*)     ALT 30      eGFR >60.0      Anion Gap 11     CBC W/ AUTO DIFFERENTIAL - Abnormal    WBC 6.41      RBC 2.76 (*)     Hemoglobin 6.1 (*)     Hematocrit 21.5 (*)     MCV 78 (*)     MCH 22.1 (*)     MCHC 28.4 (*)     RDW 22.9 (*)     Platelets 291      MPV 9.1 (*)     Immature Granulocytes CANCELED      Immature Grans (Abs) CANCELED      nRBC 6 (*)     Gran % 67.0      Lymph % 12.0 (*)     Mono % 9.0      Eosinophil % 0.0      Basophil % 0.0      Bands 4.0      Metamyelocytes 4.0      Myelocytes 4.0      Platelet Estimate Appears normal      Aniso Slight      Poik Slight      Poly Occasional      Hypo Occasional      Ovalocytes Occasional      Spherocytes Occasional      Schistocytes Present      Differential Method Manual     TYPE & SCREEN    Group & Rh A POS      Indirect Payam NEG      Specimen Outdate 08/26/2024 23:59     POCT GLUCOSE MONITORING  CONTINUOUS   PREPARE RBC SOFT        ECG Results              EKG 12-lead (Final result)        Collection Time Result Time QRS Duration OHS QTC Calculation    08/23/24 14:01:28 08/23/24 15:38:36 84 444                     Final result by Interface, Lab In [facility] (08/23/24 15:38:44)                   Narrative:    Test Reason : R53.83,    Vent. Rate : 116 BPM     Atrial Rate : 116 BPM     P-R Int : 142 ms          QRS Dur : 084 ms      QT Int : 320 ms       P-R-T Axes : 047 -13 045 degrees     QTc Int : 444 ms    Sinus tachycardia  Otherwise normal ECG  When compared with ECG of 17-JUN-2024 16:50,  No significant change was found  Confirmed by Chevy Piedra MD ([id number]) on 8/23/2024 3:38:32 PM    Referred By: [email or web address]   SELF           Confirmed By:Chevy Piedra MD                                  Imaging Results    None          Medications   0.9%  NaCl infusion (for blood administration) (has no administration in time range)   sodium chloride 0.9% flush 10 mL (has no administration in time range)   naloxone 0.4 mg/mL injection 0.02 mg (has no administration in time range)   glucose chewable tablet 16 g (has no administration in time range)   glucose chewable tablet 24 g (has no administration in time range)   glucagon (human recombinant) injection 1 mg (has no administration in time range)   insulin aspart U-100 pen 0-5 Units (has no administration in time range)   dextrose 10% bolus 125 mL 125 mL (has no administration in time range)   dextrose 10% bolus 250 mL 250 mL (has no administration in time range)   gabapentin capsule 900 mg (has no administration in time range)   HYDROcodone-acetaminophen  mg per tablet 1 tablet (has no administration in time range)   ibuprofen tablet 600 mg (has no administration in time range)   rOPINIRole tablet 0.25 mg (has no administration in time range)   morphine 12 hr tablet 15 mg (has no administration in time range)   FLUoxetine capsule 80 mg (has no administration in time  range)     Medical Decision Making  Differential diagnosis includes but is not limited to anemia 2/2 bone marrow suppression, anemia 2/2 iron deficiency, anemia 2/2 bleeding, anemia 2/2 vitamin B deficiency.     EKG w/ , sinus tachycardia. No T wave inversions or ST elevations.     Could be anemia 2/2 bone marrow suppression or combination w/ iron deficiency anemia as patient is currently on chemotherapy and has known mets to the bone. No current signs or history that would indicate bleeding.     Type and screen and 1 unit of pRBC's ordered. Accepted by medical oncology service for admission.     Amount and/or Complexity of Data Reviewed  Labs: ordered.    Risk  Prescription drug management.  Decision regarding hospitalization.                                      Clinical Impression:  Final diagnoses:  [R53.83] Fatigue  [D64.9] Symptomatic anemia (Primary)  [D64.9] Anemia, unspecified type          ED Disposition Condition    Admit Stable                Daryl Esteves DO  Resident  08/23/24 [id number]

## 2024-08-23 NOTE — H&P
[facility] - Emergency Dept  Hematology/Oncology  H&P    Patient Name: Paul Li Jr.  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 Li is a 48 YOM w/ TNBC who was previously on gemcitabine and filgrastim, patient of Dr. Linn. He was planned to have chemo today (8/23), he was found to be anemic on his pre-chemo bloodwork and was subsequently sent to the ED for blood transfusion. Patient says he was feeling tired and short of breath prior to chemo, but he denied seeing blood in his stool, urine, or having any wounds that may have led to the anemia.    The patient presented to the ED tachycardic at 116, rest of his VSS, CBC remarkable for Hgb of 6.1, CMP wnl. 1 unit of pRBC pending transfusion.      Oncology Treatment Plan:   OP BREAST GEMCITABINE QW    Medications:  Continuous Infusions:  Scheduled Meds:   FLUoxetine  80 mg Oral Daily    gabapentin  900 mg Oral TID    morphine  15 mg Oral BID    rOPINIRole  0.25 mg Oral QHS     PRN Meds:  Current Facility-Administered Medications:     0.9%  NaCl infusion (for blood administration), , Intravenous, Q24H PRN    ALPRAZolam, 1 mg, Oral, TID PRN    dextrose 10%, 12.5 g, Intravenous, PRN    dextrose 10%, 25 g, Intravenous, PRN    glucagon (human recombinant), 1 mg, Intramuscular, PRN    glucose, 16 g, Oral, PRN    glucose, 24 g, Oral, PRN    HYDROcodone-acetaminophen, 1 tablet, Oral, Q8H PRN    ibuprofen, 600 mg, Oral, Q8H PRN    insulin aspart U-100, 0-5 Units, Subcutaneous, QID (AC + HS) PRN    naloxone, 0.02 mg, Intravenous, PRN    sodium chloride 0.9%, 10 mL, Intravenous, Q12H PRN     Review of patient's allergies indicates:  No Known Allergies     Past Medical History:   Diagnosis Date    Breast cancer      Cancer     R breast    Diabetes mellitus     HTN (hypertension)     Leg edema, right     Prediabetes     Seizures     at age 18 - stress related    Therapy     marital counseling     Past Surgical History:   Procedure Laterality Date    AXILLARY NODE DISSECTION Right 11/22/2019    Procedure: LYMPHADENECTOMY, AXILLARY RIGHT;  Surgeon: Shima Whyte MD;  Location: [facility];  Service: General;  Laterality: Right;    AXILLARY NODE DISSECTION Right 12/17/2019    Procedure: LYMPHADENECTOMY, AXILLARY;  Surgeon: Shima Whyte MD;  Location: [facility] OR [address] [place] [address];  Service: General;  Laterality: Right;    BREAST BIOPSY      EXCISION OF HYDROCELE Right 10/28/2022    Procedure: HYDROCELECTOMY;  Surgeon: Anthony Cordero Jr., MD;  Location: [facility] OR [address] [place] [address];  Service: Urology;  Laterality: Right;  1 hour    INSERTION OF TUNNELED CENTRAL VENOUS CATHETER (CVC) WITH SUBCUTANEOUS PORT Left 5/24/2019    Procedure: [id number];  Surgeon: Shima Whyte MD;  Location: [facility] OR [address] [place] [address];  Service: General;  Laterality: Left;    INSERTION OF TUNNELED CENTRAL VENOUS CATHETER (CVC) WITH SUBCUTANEOUS PORT Left 9/17/2021    Procedure: INSERTION, SINGLE LUMEN CATHETER WITH PORT, WITH FLUOROSCOPIC GUIDANCE, right;  Surgeon: Gloria Holliday MD;  [facility];  Service: General;  Laterality: Left;  rapid covid test    SENTINEL LYMPH NODE BIOPSY Right 11/22/2019    Procedure: BIOPSY, LYMPH NODE, SENTINEL RIGHT;  Surgeon: Shima Whyte MD;  Location: [facility];  Service: General;  Laterality: Right;    SIMPLE MASTECTOMY Right 11/22/2019    Procedure: MASTECTOMY, SIMPLE RIGHT (CONSENT AM OF) 2.5 hr case;  Surgeon: Shima Whyte MD;  Location: [facility];  Service: General;  Laterality: Right;     Family History       Problem Relation (Age of Onset)    Breast cancer Paternal Grandmother    Colon cancer Maternal Grandfather    Diabetes Mother    Fibromyalgia Paternal Aunt    Hypertension Mother, Father    Lupus Father, Paternal Aunt    No  Known Problems Maternal Grandmother, Paternal Grandfather, Sister, Maternal Aunt, Maternal Uncle, Paternal Uncle, Brother, Sister, Son, Son, Son, Son    Post-traumatic stress disorder Brother          Tobacco Use    Smoking status: Former     Current packs/day: 0.00     Average packs/day: 0.3 packs/day for 15.0 years (3.8 ttl pk-yrs)     Types: Cigarettes, Vaping with nicotine     Start date: 1999     Quit date: 2014     Years since quittin.7    Smokeless tobacco: Never    Tobacco comments:     Social smoker with alcohol    Substance and Sexual Activity    Alcohol use: Yes     Alcohol/week: 0.0 standard drinks of alcohol     Comment: Rarely    Drug use: Not Currently     Types: Marijuana    Sexual activity: Yes     Partners: Female     Birth control/protection: None       Review of Systems   Constitutional:  Negative for chills and fever.   Respiratory:  Positive for shortness of breath.    Cardiovascular:  Negative for chest pain, palpitations and leg swelling.   Gastrointestinal:  Negative for diarrhea, nausea and vomiting.   Neurological:  Negative for headaches.     Objective:     Vital Signs (Most Recent):  Temp: 99 °F (37.2 °C) (24 1356)  Pulse: 109 (24 1517)  Resp: 18 (24 1356)  BP: (!) 140/86 (24 1517)  SpO2: 98 % (24 1517) Vital Signs (24h Range):  Temp:  [98.2 °F (36.8 °C)-99 °F (37.2 °C)] 99 °F (37.2 °C)  Pulse:  [109-116] 109  Resp:  [18] 18  SpO2:  [97 %-98 %] 98 %  BP: (135-142)/(79-86) 140/86     Weight: 110.7 kg (244 lb)  Body mass index is 32.19 kg/m².  Body surface area is 2.39 meters squared.    No intake or output data in the 24 hours ending 24 1635     Physical Exam  Vitals and nursing note reviewed.   Constitutional:       Appearance: He is obese.   HENT:      Head: Normocephalic and atraumatic.   Eyes:      General: No scleral icterus.  Cardiovascular:      Rate and Rhythm: Normal rate and regular rhythm.   Pulmonary:      Effort: Pulmonary  effort is normal.      Breath sounds: Normal breath sounds.   Abdominal:      Palpations: Abdomen is soft.   Musculoskeletal:      Right lower leg: No edema.      Left lower leg: No edema.   Neurological:      General: No focal deficit present.      Mental Status: He is alert and oriented to person, place, and time.          Significant Labs:   CBC:   Recent Labs   Lab 08/23/24  1225 08/23/24  1437   WBC 6.50 6.41   HGB 6.9* 6.1*   HCT 23.3* 21.5*    291    and CMP:   Recent Labs   Lab 08/23/24  1225 08/23/24  1437    136   K 3.9 3.9    101   CO2 24 24   * 124*   BUN 13 14   CREATININE 0.7 0.7   CALCIUM 9.6 9.4   PROT 7.5 7.1   ALBUMIN 2.6* 2.5*   BILITOT 1.0 0.9   ALKPHOS 328* 322*   AST 79* 78*   ALT 31 30   ANIONGAP 11 11       Diagnostic Results:  I have reviewed all pertinent imaging results/findings within the past 24 hours.  None  Assessment/Plan:     * Malignant neoplasm involving both nipple and areola of right breast in male, estrogen receptor negative  Mr. 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 Linn. In the hospital for symptomatic, recurring, anemia 2/2 malignancy.     - Dr. Linn discussed GOC in ED  - Pt DNR  - Will continue to broach subject of comfort care with patient, he is understanding of his prognosis and will contemplate hospice care.    Microcytic anemia  - Patient presented to the ED with a Hgb of 6.1  - Likely 2/2 malignancy  - Will transfuse 1 uPRBC and reassess CBC  - Pt understands this is a temporary fix and may be futile given his condition    Primary hypertension  - holding blood pressure medications as of now        Leandro Turcios MD  Hematology/Oncology  [facility] - Emergency Dept

## 2024-08-23 NOTE — ASSESSMENT & PLAN NOTE
- Patient presented to the ED with a Hgb of 6.1  - Likely 2/2 malignancy  - Will transfuse 1 uPRBC and reassess CBC  - Pt understands this is a temporary fix and may be futile given his condition

## 2024-08-23 NOTE — NURSING
Nurses Note -- 4 Eyes      8/23/2024   6:28 PM      Skin assessed during: Admit      [x] No Altered Skin Integrity Present    []Prevention Measures Documented      [] Yes- Altered Skin Integrity Present or Discovered   [] LDA Added if Not in Epic (Describe Wound)   [] New Altered Skin Integrity was Present on Admit and Documented in LDA   [] Wound Image Taken    Wound Care Consulted? No    Attending Nurse:  KANDI Ness    Second RN/Staff Member:  KANDI Rendon

## 2024-08-23 NOTE — NURSING
Pt arrived to unit @1735 per stretcher from ED. Pt has unit of PRBC infusing at this time. Pt spouse at bedside. AAOx4, independent. Glasses on . V/S and assessment completed.

## 2024-08-23 NOTE — Clinical Note
Diagnosis: Anemia, unspecified type [[id number]]   Future Attending Provider: HERMAN BRIGGS [[id number]]   Reason for IP Medical Treatment  (Clinical interventions that can only be accomplished in the IP setting? ) :: goals of care

## 2024-08-23 NOTE — ED TRIAGE NOTES
Abnormal labs (Sent from [facility], on chemo, mets breast cancer, low H&H needs blood transfusion +fatigue ) Last chemo two weeks ago

## 2024-08-23 NOTE — SUBJECTIVE & OBJECTIVE
Oncology Treatment Plan:   OP BREAST GEMCITABINE QW    Medications:  Continuous Infusions:  Scheduled Meds:   FLUoxetine  80 mg Oral Daily    gabapentin  900 mg Oral TID    morphine  15 mg Oral BID    rOPINIRole  0.25 mg Oral QHS     PRN Meds:  Current Facility-Administered Medications:     0.9%  NaCl infusion (for blood administration), , Intravenous, Q24H PRN    ALPRAZolam, 1 mg, Oral, TID PRN    dextrose 10%, 12.5 g, Intravenous, PRN    dextrose 10%, 25 g, Intravenous, PRN    glucagon (human recombinant), 1 mg, Intramuscular, PRN    glucose, 16 g, Oral, PRN    glucose, 24 g, Oral, PRN    HYDROcodone-acetaminophen, 1 tablet, Oral, Q8H PRN    ibuprofen, 600 mg, Oral, Q8H PRN    insulin aspart U-100, 0-5 Units, Subcutaneous, QID (AC + HS) PRN    naloxone, 0.02 mg, Intravenous, PRN    sodium chloride 0.9%, 10 mL, Intravenous, Q12H PRN     Review of patient's allergies indicates:  No Known Allergies     Past Medical History:   Diagnosis Date    Breast cancer     Cancer     R breast    Diabetes mellitus     HTN (hypertension)     Leg edema, right     Prediabetes     Seizures     at age 18 - stress related    Therapy     marital counseling     Past Surgical History:   Procedure Laterality Date    AXILLARY NODE DISSECTION Right 11/22/2019    Procedure: LYMPHADENECTOMY, AXILLARY RIGHT;  Surgeon: Shima Whyte MD;  Location: [facility];  Service: General;  Laterality: Right;    AXILLARY NODE DISSECTION Right 12/17/2019    Procedure: LYMPHADENECTOMY, AXILLARY;  Surgeon: Shima Whyte MD;  Location: [address];  Service: General;  Laterality: Right;    BREAST BIOPSY      EXCISION OF HYDROCELE Right 10/28/2022    Procedure: HYDROCELECTOMY;  Surgeon: Anthony Cordero Jr., MD;  Location: [address];  Service: Urology;  Laterality: Right;  1 hour    INSERTION OF TUNNELED CENTRAL VENOUS CATHETER (CVC) WITH SUBCUTANEOUS PORT Left 5/24/2019    Procedure: [id number];  Surgeon: Shima Whyte MD;  Location: [facility]  2ND FLR;  Service: General;  Laterality: Left;    INSERTION OF TUNNELED CENTRAL VENOUS CATHETER (CVC) WITH SUBCUTANEOUS PORT Left 2021    Procedure: INSERTION, SINGLE LUMEN CATHETER WITH PORT, WITH FLUOROSCOPIC GUIDANCE, right;  Surgeon: Gloria Holliday MD;  Location: [facility] OR 2ND FLR;  Service: General;  Laterality: Left;  rapid covid test    SENTINEL LYMPH NODE BIOPSY Right 2019    Procedure: BIOPSY, LYMPH NODE, SENTINEL RIGHT;  Surgeon: Shima Whyte MD;  Location: [facility];  Service: General;  Laterality: Right;    SIMPLE MASTECTOMY Right 2019    Procedure: MASTECTOMY, SIMPLE RIGHT (CONSENT AM OF) 2.5 hr case;  Surgeon: Shima Whyte MD;  Location: [facility];  Service: General;  Laterality: Right;     Family History       Problem Relation (Age of Onset)    Breast cancer Paternal Grandmother    Colon cancer Maternal Grandfather    Diabetes Mother    Fibromyalgia Paternal Aunt    Hypertension Mother, Father    Lupus Father, Paternal Aunt    No Known Problems Maternal Grandmother, Paternal Grandfather, Sister, Maternal Aunt, Maternal Uncle, Paternal Uncle, Brother, Sister, Son, Son, Son, Son    Post-traumatic stress disorder Brother          Tobacco Use    Smoking status: Former     Current packs/day: 0.00     Average packs/day: 0.3 packs/day for 15.0 years (3.8 ttl pk-yrs)     Types: Cigarettes, Vaping with nicotine     Start date: 1999     Quit date: 2014     Years since quittin.7    Smokeless tobacco: Never    Tobacco comments:     Social smoker with alcohol    Substance and Sexual Activity    Alcohol use: Yes     Alcohol/week: 0.0 standard drinks of alcohol     Comment: Rarely    Drug use: Not Currently     Types: Marijuana    Sexual activity: Yes     Partners: Female     Birth control/protection: None       Review of Systems   Constitutional:  Negative for chills and fever.   Respiratory:  Positive for shortness of breath.    Cardiovascular:  Negative for chest pain,  palpitations and leg swelling.   Gastrointestinal:  Negative for diarrhea, nausea and vomiting.   Neurological:  Negative for headaches.     Objective:     Vital Signs (Most Recent):  Temp: 99 °F (37.2 °C) (08/23/24 1356)  Pulse: 109 (08/23/24 1517)  Resp: 18 (08/23/24 1356)  BP: (!) 140/86 (08/23/24 1517)  SpO2: 98 % (08/23/24 1517) Vital Signs (24h Range):  Temp:  [98.2 °F (36.8 °C)-99 °F (37.2 °C)] 99 °F (37.2 °C)  Pulse:  [109-116] 109  Resp:  [18] 18  SpO2:  [97 %-98 %] 98 %  BP: (135-142)/(79-86) 140/86     Weight: 110.7 kg (244 lb)  Body mass index is 32.19 kg/m².  Body surface area is 2.39 meters squared.    No intake or output data in the 24 hours ending 08/23/24 1635     Physical Exam  Vitals and nursing note reviewed.   Constitutional:       Appearance: He is obese.   HENT:      Head: Normocephalic and atraumatic.   Eyes:      General: No scleral icterus.  Cardiovascular:      Rate and Rhythm: Normal rate and regular rhythm.   Pulmonary:      Effort: Pulmonary effort is normal.      Breath sounds: Normal breath sounds.   Abdominal:      Palpations: Abdomen is soft.   Musculoskeletal:      Right lower leg: No edema.      Left lower leg: No edema.   Neurological:      General: No focal deficit present.      Mental Status: He is alert and oriented to person, place, and time.          Significant Labs:   CBC:   Recent Labs   Lab 08/23/24  1225 08/23/24  1437   WBC 6.50 6.41   HGB 6.9* 6.1*   HCT 23.3* 21.5*    291    and CMP:   Recent Labs   Lab 08/23/24  1225 08/23/24  1437    136   K 3.9 3.9    101   CO2 24 24   * 124*   BUN 13 14   CREATININE 0.7 0.7   CALCIUM 9.6 9.4   PROT 7.5 7.1   ALBUMIN 2.6* 2.5*   BILITOT 1.0 0.9   ALKPHOS 328* 322*   AST 79* 78*   ALT 31 30   ANIONGAP 11 11       Diagnostic Results:  I have reviewed all pertinent imaging results/findings within the past 24 hours.  None

## 2024-08-23 NOTE — PROGRESS NOTES
Well known to me- 47 yo with metastatic TNBC  See my pror notes for treatment  He has required transfusions increasingly  He has not been able to stay on Gemzar as scheduled or at standard dosing    Sent to ED for a symptomatic anemia  Labs also reveal continued low albumin  Prior anemia work up showed mild iron def    + worsening fatigue  Dizziness with change in position if moves too quickly  + dyspnea with exertion  + weight loss- 16 lbs since last visit and less of an appetite  Continued low back pain- same level 6-7/10 taking pain meds as prescribed  Denies new areas of pain  Denies HAs  No fevers or chills, no infectious symptoms  Normal urination  Normal bowels  No bleeding noted and no abnormal bruising    Mild hypertension at home  No hypotension  Has been tachycardic    Plan:  We discussed transfusion of 2 Units of PRBCs  We discussed that further treatment is not wise- limitations in options, increased toxicity risk with performance status and clinical state including refractory anemia, weight loss and physical limitations.  Recommend Hospice and reviewed we will continue to support  Goals of QOL discussed and patient and wife concur  DNR

## 2024-08-23 NOTE — ASSESSMENT & PLAN NOTE
Mr. 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 Linn. In the hospital for symptomatic, recurring, anemia 2/2 malignancy.     - Dr. Linn discussed GOC in ED  - Pt DNR  - Will continue to broach subject of comfort care with patient, he is understanding of his prognosis and will contemplate hospice care.

## 2024-08-23 NOTE — ED NOTES
Patient identifiers verified and correct for Paul Li  LOC: The patient is awake, alert and aware of environment with an appropriate affect, the patient is oriented x 3 and speaking appropriately.   APPEARANCE: Patient appears comfortable and in no acute distress, patient is clean and well groomed.  SKIN: The skin is warm and dry, color consistent with ethnicity, patient has normal skin turgor and moist mucus membranes, skin intact, no breakdown or bruising noted.   MUSCULOSKELETAL: Patient moving all extremities spontaneously, no swelling noted.  RESPIRATORY: Airway is open and patent, respirations are spontaneous, patient has a normal effort and rate, no accessory muscle use noted, O2 Sat 97% on room air.  CARDIAC: Patient has a tachy rate and regular rhythm, no edema noted, capillary refill < 3 seconds.   GASTRO: Soft and non tender to palpation, no distention noted, Pt states bowel movements have been regular.  : Pt denies any pain or frequency with urination.  NEURO: Pt opens eyes spontaneously, behavior appropriate to situation, follows commands, facial expression symmetrical, bilateral hand grasp equal and even, purposeful motor response noted, normal sensation in all extremities when touched with a finger.

## 2024-08-23 NOTE — HPI
Mr. Li is a 48 YOM w/ TNBC who was previously on gemcitabine and filgrastim, patient of Dr. Linn. He was planned to have chemo today (8/23), he was found to be anemic on his pre-chemo bloodwork and was subsequently sent to the ED for blood transfusion. Patient says he was feeling tired and short of breath prior to chemo, but he denied seeing blood in his stool, urine, or having any wounds that may have led to the anemia.    The patient presented to the ED tachycardic at 116, rest of his VSS, CBC remarkable for Hgb of 6.1, CMP wnl. 1 unit of pRBC pending transfusion.

## 2024-08-24 VITALS
DIASTOLIC BLOOD PRESSURE: 79 MMHG | SYSTOLIC BLOOD PRESSURE: 121 MMHG | OXYGEN SATURATION: 96 % | WEIGHT: 244.94 LBS | BODY MASS INDEX: 32.46 KG/M2 | HEART RATE: 95 BPM | TEMPERATURE: 98 F | RESPIRATION RATE: 17 BRPM | HEIGHT: 73 IN

## 2024-08-24 LAB
ALBUMIN SERPL BCP-MCNC: 2.3 G/DL (ref 3.5–5.2)
ALP SERPL-CCNC: 287 U/L (ref 55–135)
ALT SERPL W/O P-5'-P-CCNC: 26 U/L (ref 10–44)
ANION GAP SERPL CALC-SCNC: 10 MMOL/L (ref 8–16)
ANISOCYTOSIS BLD QL SMEAR: SLIGHT
AST SERPL-CCNC: 69 U/L (ref 10–40)
BASO STIPL BLD QL SMEAR: ABNORMAL
BASOPHILS NFR BLD: 0 % (ref 0–1.9)
BILIRUB SERPL-MCNC: 1 MG/DL (ref 0.1–1)
BUN SERPL-MCNC: 10 MG/DL (ref 6–20)
CALCIUM SERPL-MCNC: 9 MG/DL (ref 8.7–10.5)
CHLORIDE SERPL-SCNC: 102 MMOL/L (ref 95–110)
CO2 SERPL-SCNC: 26 MMOL/L (ref 23–29)
CREAT SERPL-MCNC: 0.7 MG/DL (ref 0.5–1.4)
DIFFERENTIAL METHOD BLD: ABNORMAL
EOSINOPHIL NFR BLD: 0 % (ref 0–8)
ERYTHROCYTE [DISTWIDTH] IN BLOOD BY AUTOMATED COUNT: 21.7 % (ref 11.5–14.5)
EST. GFR  (NO RACE VARIABLE): >60 ML/MIN/1.73 M^2
GLUCOSE SERPL-MCNC: 75 MG/DL (ref 70–110)
HCT VFR BLD AUTO: 21 % (ref 40–54)
HGB BLD-MCNC: 6.3 G/DL (ref 14–18)
IMM GRANULOCYTES # BLD AUTO: ABNORMAL K/UL (ref 0–0.04)
IMM GRANULOCYTES NFR BLD AUTO: ABNORMAL % (ref 0–0.5)
LYMPHOCYTES NFR BLD: 23 % (ref 18–48)
MCH RBC QN AUTO: 23.9 PG (ref 27–31)
MCHC RBC AUTO-ENTMCNC: 30 G/DL (ref 32–36)
MCV RBC AUTO: 80 FL (ref 82–98)
METAMYELOCYTES NFR BLD MANUAL: 3 %
MONOCYTES NFR BLD: 7 % (ref 4–15)
MYELOCYTES NFR BLD MANUAL: 1 %
NEUTROPHILS NFR BLD: 64 % (ref 38–73)
NEUTS BAND NFR BLD MANUAL: 2 %
NRBC BLD-RTO: 6 /100 WBC
OVALOCYTES BLD QL SMEAR: ABNORMAL
PLATELET # BLD AUTO: 253 K/UL (ref 150–450)
PMV BLD AUTO: 9.2 FL (ref 9.2–12.9)
POIKILOCYTOSIS BLD QL SMEAR: SLIGHT
POLYCHROMASIA BLD QL SMEAR: ABNORMAL
POTASSIUM SERPL-SCNC: 3.5 MMOL/L (ref 3.5–5.1)
PROT SERPL-MCNC: 6.5 G/DL (ref 6–8.4)
RBC # BLD AUTO: 2.64 M/UL (ref 4.6–6.2)
SODIUM SERPL-SCNC: 138 MMOL/L (ref 136–145)
WBC # BLD AUTO: 5.1 K/UL (ref 3.9–12.7)

## 2024-08-24 PROCEDURE — 85007 BL SMEAR W/DIFF WBC COUNT: CPT

## 2024-08-24 PROCEDURE — 25000003 PHARM REV CODE 250

## 2024-08-24 PROCEDURE — 25000003 PHARM REV CODE 250: Performed by: STUDENT IN AN ORGANIZED HEALTH CARE EDUCATION/TRAINING PROGRAM

## 2024-08-24 PROCEDURE — 63600175 PHARM REV CODE 636 W HCPCS

## 2024-08-24 PROCEDURE — 85027 COMPLETE CBC AUTOMATED: CPT

## 2024-08-24 PROCEDURE — 80053 COMPREHEN METABOLIC PANEL: CPT

## 2024-08-24 PROCEDURE — 99239 HOSP IP/OBS DSCHRG MGMT >30: CPT | Mod: GC,,, | Performed by: INTERNAL MEDICINE

## 2024-08-24 RX ORDER — HEPARIN 100 UNIT/ML
100 SYRINGE INTRAVENOUS
Status: DISCONTINUED | OUTPATIENT
Start: 2024-08-24 | End: 2024-08-24 | Stop reason: HOSPADM

## 2024-08-24 RX ORDER — LOSARTAN POTASSIUM 50 MG/1
100 TABLET ORAL DAILY
Qty: 180 TABLET | Refills: 3 | Status: SHIPPED | OUTPATIENT
Start: 2024-08-24 | End: 2025-08-24

## 2024-08-24 RX ORDER — HYDROCHLOROTHIAZIDE 25 MG/1
25 TABLET ORAL DAILY
Qty: 30 TABLET | Refills: 11 | Status: SHIPPED | OUTPATIENT
Start: 2024-08-24 | End: 2025-08-24

## 2024-08-24 RX ORDER — HYDROCODONE BITARTRATE AND ACETAMINOPHEN 500; 5 MG/1; MG/1
TABLET ORAL
Status: DISCONTINUED | OUTPATIENT
Start: 2024-08-24 | End: 2024-08-24 | Stop reason: HOSPADM

## 2024-08-24 RX ORDER — AMLODIPINE BESYLATE 10 MG/1
10 TABLET ORAL DAILY
Qty: 90 TABLET | Refills: 3 | Status: SHIPPED | OUTPATIENT
Start: 2024-08-24 | End: 2025-08-24

## 2024-08-24 RX ORDER — FERROUS SULFATE, DRIED 160(50) MG
1 TABLET, EXTENDED RELEASE ORAL 2 TIMES DAILY WITH MEALS
Qty: 60 TABLET | Refills: 11 | Status: SHIPPED | OUTPATIENT
Start: 2024-08-24 | End: 2025-08-24

## 2024-08-24 RX ADMIN — GABAPENTIN 900 MG: 300 CAPSULE ORAL at 08:08

## 2024-08-24 RX ADMIN — FLUOXETINE HYDROCHLORIDE 80 MG: 20 CAPSULE ORAL at 08:08

## 2024-08-24 RX ADMIN — HEPARIN 100 UNITS: 100 SYRINGE at 12:08

## 2024-08-24 RX ADMIN — MORPHINE SULFATE 15 MG: 15 TABLET, EXTENDED RELEASE ORAL at 08:08

## 2024-08-24 NOTE — PROGRESS NOTES
Patient is known to this Oncology Social Worker. Visited patient's room this PM after seeing patient's wife in the cafeteria and she related that patient had been admitted today and Dr. Linn spoke with them in the ED and is recommending hospice. Patient had a lab appointment in clinic today and was sent from clinic to the ED. Patient has Metastatic Triple Negative Breast Cancer, H&H was low and he was admitted due to symptomatic anemia, SOB and fatigue, and he is receiving blood transfusions. Met with patient, his wife, and his brother Tamir and sister-in-law Precious. Offered support and services. Explained about hospice and that my coworker Betty Serrano is the Oncology Social Worker on call over the weekend and she will be able to assist through the weekend, and I will give report to her. Patient's wife said that she will call me on Monday. Will continue to follow.

## 2024-08-24 NOTE — PROGRESS NOTES
On SW received a call from Leandro BRICEÑO MD informing On Call SIDDHARTH that the patient is medically stable to discharge today to Home Hospice.     On Call SW contacted the patient's spouse, Mesha to discuss the patient's discharge plans for Home Hospice. Per Mesha, she had received some hospice agencies from her primary SW, SWETA Gaston LCSW and would like a referral sent over to [facility]. Meng expressed she could transport the patient home via their personal vehicle and does not need transportation assistance from On Call SIDDHARTH. On Call SIDDHARTH explained home hospice to Sidney and answered and addressed all questions and concerns.     On Call SIDDHARTH contacted [facility] and emailed the referral to their On Call RN, Laura at [email or web address]. On Call SIDDHARTH discussed Meng's wishes for someone to come out to where the patient will be staying upon discharge ([address] [place] [address]) to sign the patient's consent forms. On Call SIDDHARTH will follow up with Sidney prior to their discharge. On Call SIDDHARTH will continue to follow.     12:00 PM  On Call SIDDHARTH received a call from Laura informing On SW that she spoke with Mesha and everything will be setup for the patient later today. On Call SIDDHARTH informed Laura to contact On Call SIDDHARTH if any additional documentation is needed.    SIDDHARTH contacted Mesha to confirm [facility] will meet with her and the patient to sign consent forms and Ragini confirmed. On Call SIDDHARTH expressed well wishes and informed Mesha to contact On Call SIDDHARTH if any of needs arises. Mesha thanked On Call SIDDHARTH and expressed her gratitude.     On Call SIDDHARTH notified the patient's MD BRICEÑO Rakin of the patient's discharge plans. SIDDHARTH continues to be available as needed.     BRADEN Murcia, [facility] [place]  [place] Social Worker   [facility] - Oncology  [phone]

## 2024-08-24 NOTE — DISCHARGE SUMMARY
[facility] - Oncology ([facility])  Hematology/Oncology  Discharge Summary      Patient Name: Paul Li Jr.  MRN: [id number]  Admission Date: 8/23/2024  Hospital Length of Stay: 1 days  Discharge Date and Time:  08/24/2024 12:11 PM  Attending Physician: Yaz Prajapati MD   Discharging Provider: Leandro Turcios MD  Primary Care Provider: Kareem Arroyo MD    HPI: Mr. Li is a 48 YOM w/ TNBC who was previously on gemcitabine and filgrastim, patient of Dr. Linn. He was planned to have chemo today (8/23), he was found to be anemic on his pre-chemo bloodwork and was subsequently sent to the ED for blood transfusion. Patient says he was feeling tired and short of breath prior to chemo, but he denied seeing blood in his stool, urine, or having any wounds that may have led to the anemia.    The patient presented to the ED tachycardic at 116, rest of his VSS, CBC remarkable for Hgb of 6.1, CMP wnl. 1 unit of pRBC pending transfusion.     * No surgery found *     Hospital Course: Mr. Li was admitted to the medical oncology for symptomatic anemia w/ Hgb of 6.1. The patient has been admitted for a similar problem in the same year, and his primary oncologist (Dr. Linn) d/w him his prognosis and GOC. It was ultimately decided the patient was to transition to comfort care after 1 unit of prbc was to be transfused. After transfusion the patient remained anemic at 6.3 and it was decided by the patient that it was time for him to go home with hospice so that he may be with his family. VSS, labs otherwise stable, the patient is cleared to return home with hospice.     Goals of Care Treatment Preferences:  Code Status: DNR    Health care agent: Efraín Li  [facility] care agent number: [phone]          What is most important right now is to focus on symptom/pain control, extending life as long as possible, even it it means sacrificing quality.  Accordingly, we have decided that the best plan to meet the patient's goals  "includes continuing with treatment.      Consults:   Consults (From admission, onward)          Status Ordering Provider     Inpatient consult to Hospice  Once        Provider:  (Not yet assigned)    Acknowledged MINE PALMER            Significant Diagnostic Studies: Labs: CBC   Recent Labs   Lab 08/23/24  1225 08/23/24  1437 08/24/24  0336   WBC 6.50 6.41 5.10   HGB 6.9* 6.1* 6.3*   HCT 23.3* 21.5* 21.0*    291 253       Pending Diagnostic Studies:       None          Final Active Diagnoses:    Diagnosis Date Noted POA    PRINCIPAL PROBLEM:  Malignant neoplasm involving both nipple and areola of right breast in male, estrogen receptor negative [C50.021, Z17.1] 05/17/2019 Not Applicable    Microcytic anemia [D50.9] 05/17/2019 Yes    Primary hypertension [I10] 11/14/2017 Yes      Problems Resolved During this Admission:      Discharged Condition: stable    Disposition: Hospice/Home    Follow Up:    Patient Instructions:      BATH/SHOWER CHAIR FOR HOME USE     Order Specific Question Answer Comments   Height: 6' 1" (1.854 m)    Weight: 111.1 kg (244 lb 14.9 oz)    Length of need (1-99 months): 99    Type: With back      Medications:  Reconciled Home Medications:      Medication List        CHANGE how you take these medications      amLODIPine 10 MG tablet  Commonly known as: NORVASC  Take 1 tablet (10 mg total) by mouth once daily.  What changed:   how much to take  how to take this  when to take this     calcium-vitamin D3 500 mg-5 mcg (200 unit) per tablet  Commonly known as: OYSTER SHELL CALCIUM-VIT D3  Take 1 tablet by mouth 2 (two) times daily with meals.  What changed: when to take this     hydroCHLOROthiazide 25 MG tablet  Commonly known as: HYDRODIURIL  Take 1 tablet (25 mg total) by mouth once daily.  What changed:   how much to take  how to take this  when to take this     losartan 50 MG tablet  Commonly known as: COZAAR  Take 2 tablets (100 mg total) by mouth once daily.  What changed:   how much to " take  how to take this  when to take this            CONTINUE taking these medications      ALPRAZolam 0.5 MG tablet  Commonly known as: XANAX  Take 1 tablet (0.5 mg total) by mouth 3 (three) times daily as needed for Insomnia or Anxiety.     bisacodyL 5 mg EC tablet  Commonly known as: DULCOLAX  Take 1 tablet (5 mg total) by mouth daily as needed for Constipation.     diphenoxylate-atropine 2.5-0.025 mg 2.5-0.025 mg per tablet  Commonly known as: LOMOTIL  Take 1 tablet by mouth 4 (four) times daily as needed for Diarrhea.     FLUoxetine 40 MG capsule  Take 2 capsules (80 mg total) by mouth once daily.     gabapentin 300 MG capsule  Commonly known as: NEURONTIN  Take 3 capsules (900 mg total) by mouth 3 (three) times daily.     HYDROcodone-acetaminophen  mg per tablet  Commonly known as: NORCO  Take 1 tablet by mouth every 8 (eight) hours as needed for Pain.     ibuprofen 600 MG tablet  Commonly known as: ADVIL,MOTRIN  Take 1 tablet (600 mg total) by mouth every 8 (eight) hours as needed for Pain.     * LIDOcaine 5 %  Commonly known as: LIDODERM  Place 1 patch onto the skin once daily. Remove & Discard patch within 12 hours or as directed by MD     * LIDOcaine 5 %  Commonly known as: LIDODERM  Place 1 patch onto the skin once daily. Remove & Discard patch within 12 hours or as directed by MD     loratadine 10 mg tablet  Commonly known as: CLARITIN  Take 1 tablet (10 mg total) by mouth once daily.     metFORMIN 500 MG tablet  Commonly known as: GLUCOPHAGE  Take 2 tablets (1,000 mg total) by mouth 2 (two) times daily with meals. Needs appointment for future refills     morphine 15 MG 12 hr tablet  Commonly known as: MS CONTIN  Take 1 tablet (15 mg total) by mouth 2 (two) times daily.     nortriptyline 50 MG capsule  Commonly known as: PAMELOR  Take 1 capsule (50 mg total) by mouth every evening.     * ondansetron 8 MG Tbdl  Commonly known as: ZOFRAN-ODT  Dissolve 1 tablet (8 mg total) by mouth or dissolve on  "tongue every 8 (eight) hours as needed (nausea/vomiting).  Let saliva dissolve tablet.     * ondansetron 8 MG Tbdl  Commonly known as: ZOFRAN-ODT  Take 1 tablet (8 mg total) by mouth every 8 (eight) hours as needed (nausea).     rOPINIRole 0.25 MG tablet  Commonly known as: REQUIP  Take 1 tablet (0.25 mg total) by mouth every evening.           * This list has 4 medication(s) that are the same as other medications prescribed for you. Read the directions carefully, and ask your doctor or other care provider to review them with you.                STOP taking these medications      BD ULTRA-FINE TANESHA PEN NEEDLE 32 gauge x 5/32" Ndle  Generic drug: pen needle, diabetic     ferrous sulfate 325 mg (65 mg iron) Tab tablet  Commonly known as: IRON     HumaLOG KwikPen Insulin 100 unit/mL pen  Generic drug: insulin lispro     lancing device Misc     LEVEMIR FLEXPEN 100 unit/mL (3 mL) Inpn pen  Generic drug: insulin detemir U-100 (Levemir)     OLANZapine 5 MG tablet  Commonly known as: ZyPREXA     ONETOUCH DELICA PLUS LANCET 33 gauge Misc  Generic drug: lancets     pantoprazole 40 MG tablet  Commonly known as: PROTONIX     tadalafiL 5 MG tablet  Commonly known as: CIALIS     TRULICITY 1.5 mg/0.5 mL pen injector  Generic drug: dulaglutide              Leandro Turcios MD  Hematology/Oncology  [facility] - Oncology ([facility])        "

## 2024-08-24 NOTE — PLAN OF CARE
Plan of care reviewed with patient. Pt being discharged to home.  Discharge instructions reviewed and given to patient. Prescription teaching performed by pharmacy. Port line removed by KANDI Leyva.  Transported by wheelchair.     Warren GARCIA Case  8/24/2024  1:59 PM

## 2024-08-24 NOTE — PLAN OF CARE
[facility]  Department of Hospital Medicine  [address] [place] [address]  [phone] after hours  [phone] fax    HOSPICE  ORDERS    08/24/2024    Admit to Hospice:  Home Service     Diagnoses:   Active Hospital Problems    Diagnosis  POA    *Malignant neoplasm involving both nipple and areola of right breast in male, estrogen receptor negative [C50.021, Z17.1]  Not Applicable     1.  Metastatic Breast Cancer:   1.  Right Breast Cancer Stage IB (pT1b pN1):  T=5 mm, G=2, N=1/15, ER=negative, KY=negative. [id number]: IHC 0, FISH=.  Ki67=80%.  MammaPrint=.  Date: 5/2/2019    A.  Histology: Invasive ductal carcinoma with lobular features    B.  Biopsy: 5/2/2019: Right breast retroareolar core biopsy    C.  Staging Studies:    D.  NeoAdjuvant Chemo:      ddAC x 4 / TaxolCarbo wkly x 12.   5/27/2019 - 10/15/2019    E.  Surgery:     11/22/2019: R Mastectomy/SLN Dr Whyte: Right breast mastectomy with SLN biopsy no LVI with 1 LN positive 4 mm, negative margins.     12/17/2019:  ALND  0/14 positive for carcinoma.    F.  XRT: 2/3/2020- 3/23/2020: Total Dose: 50.4 Gy to the chest wall and regional lymphatic    G.  Adjuvant Chemo:       Xeloda 1000 mg/m2 bid days 1-14 every 21 day x 6.   4/15/2020 - 9/28/20     2.  Recurrence:  7/2/2021.    A.  Biopsy:  Left iliac bone biopsy: 7/2/2021: Positive for metastatic breast ca    B.  Staging:     MRI T/L spine 6/3/2021:  Abnormal  T12, S1, S2 vertebral bodies, LEFT sacrum and ilium concerning for metastatic disease.      PET 6/9/2021: Multiple sclerotic lesions in the T12 vertebral body, sacrum, and left iliac bone with hypermetabolic activity concerning for active metastatic disease.        C.  Treatment:     Abraxane:  8/13/2021 - 3/25/2023.     SACITUZUMAB q 2wk with Neulasta support:        5/19/2023 - 7/14/2023.        Resumed 8/30/2023 - 2/14/2024.  Progression following stable disease.    2.  Genetic Testing:  Genetic testing:  AXIN2, RNF43 (VUS) identified.    3.  Vision loss L eye 2/28/2024.    TREATMENT PLAN:     Vision loss OS:  Consult Dr. Colbert 2/28/2024.    MRI Brain and orbits ordered 2/28/2024.     Faslodex pending   Abemaciclib/ribociclib pending    Discontinue trazodone with ribociclib.    Zometa q 4 wk:  8/2022 - .      Liquid biopsy:      PIK3CA mutation.  Pt receive Apelisib in the past.    PDL-1 <1%.    Restaging:   PET: 2/15/2022: stable osseous sclerotic lesions and pulmonary micro nodules  PET 6/6/2022: stable   PET 11/4/2022:stable  PET 3/20/2023: New derick metastasis (right hilar node). Otherwise stable  MRI sacrum/coccyx 3/29/2023: Extensive osseous metastatic disease of the pelvis involving much of the sacrum.  No evidence for invasion of the sacral canal or neural foramina.  MRI L-spine 3/29/2023: Dffuse osseous metastatic disease throughout the lumbar spine.  PET 6/22/2023: Findings suggestive of disease progression, noting worsening osseous metastatic disease and subcarinal/right hilar lymphadenopathy.  PET 10/10/2023:  Stable except subcarinal LN increase per PET.  CT images are not convincing.  PET 2/27/2024:  Progression in intra-thoracic LN.        Microcytic anemia [D50.9]  Yes     - Microcytic anemia present since atleast 2017 without significant change.  - Will test with hemoglobin electrophoresis in future.      Primary hypertension [I10]  Yes     - Managed by PCP        Resolved Hospital Problems   No resolved problems to display.       Hospice Qualifying Diagnoses: TNBC with mets to the bone       Patient has a life expectancy < 6 months due to:  Primary Hospice Diagnosis: Metastatic TNBC  Comorbid Conditions Contributing to Decline:  DM, HTN   Vital Signs: Routine per Hospice Protocol.    Code Status: DNR    Allergies: Review of patient's allergies indicates:  No Known Allergies    Diet: Regular     Activities: As tolerated    Goals of Care Treatment Preferences:  Code Status: Full Code    Health  care agent: Efraín Li  [facility] care agent number: [phone]          What is most important right now is to focus on symptom/pain control, extending life as long as possible, even it it means sacrificing quality.  Accordingly, we have decided that the best plan to meet the patient's goals includes continuing with treatment.      Nursing: Per Hospice Routine     Patient has chemo port in place, please ensure the area remains clean and does not appear infected.       Medications:          Medication List        CHANGE how you take these medications      amLODIPine 10 MG tablet  Commonly known as: NORVASC  Take 1 tablet (10 mg total) by mouth once daily.  What changed:   how much to take  how to take this  when to take this     calcium-vitamin D3 500 mg-5 mcg (200 unit) per tablet  Commonly known as: OYSTER SHELL CALCIUM-VIT D3  Take 1 tablet by mouth 2 (two) times daily with meals.  What changed: when to take this     hydroCHLOROthiazide 25 MG tablet  Commonly known as: HYDRODIURIL  Take 1 tablet (25 mg total) by mouth once daily.  What changed:   how much to take  how to take this  when to take this     losartan 50 MG tablet  Commonly known as: COZAAR  Take 2 tablets (100 mg total) by mouth once daily.  What changed:   how much to take  how to take this  when to take this            CONTINUE taking these medications      ALPRAZolam 0.5 MG tablet  Commonly known as: XANAX  Take 1 tablet (0.5 mg total) by mouth 3 (three) times daily as needed for Insomnia or Anxiety.     bisacodyL 5 mg EC tablet  Commonly known as: DULCOLAX  Take 1 tablet (5 mg total) by mouth daily as needed for Constipation.     diphenoxylate-atropine 2.5-0.025 mg 2.5-0.025 mg per tablet  Commonly known as: LOMOTIL  Take 1 tablet by mouth 4 (four) times daily as needed for Diarrhea.     FLUoxetine 40 MG capsule  Take 2 capsules (80 mg total) by mouth once daily.     gabapentin 300 MG capsule  Commonly known as: NEURONTIN  Take 3 capsules (900  "mg total) by mouth 3 (three) times daily.     HYDROcodone-acetaminophen  mg per tablet  Commonly known as: NORCO  Take 1 tablet by mouth every 8 (eight) hours as needed for Pain.     ibuprofen 600 MG tablet  Commonly known as: ADVIL,MOTRIN  Take 1 tablet (600 mg total) by mouth every 8 (eight) hours as needed for Pain.     * LIDOcaine 5 %  Commonly known as: LIDODERM  Place 1 patch onto the skin once daily. Remove & Discard patch within 12 hours or as directed by MD     * LIDOcaine 5 %  Commonly known as: LIDODERM  Place 1 patch onto the skin once daily. Remove & Discard patch within 12 hours or as directed by MD     loratadine 10 mg tablet  Commonly known as: CLARITIN  Take 1 tablet (10 mg total) by mouth once daily.     metFORMIN 500 MG tablet  Commonly known as: GLUCOPHAGE  Take 2 tablets (1,000 mg total) by mouth 2 (two) times daily with meals. Needs appointment for future refills     morphine 15 MG 12 hr tablet  Commonly known as: MS CONTIN  Take 1 tablet (15 mg total) by mouth 2 (two) times daily.     nortriptyline 50 MG capsule  Commonly known as: PAMELOR  Take 1 capsule (50 mg total) by mouth every evening.     * ondansetron 8 MG Tbdl  Commonly known as: ZOFRAN-ODT  Dissolve 1 tablet (8 mg total) by mouth or dissolve on tongue every 8 (eight) hours as needed (nausea/vomiting).  Let saliva dissolve tablet.     * ondansetron 8 MG Tbdl  Commonly known as: ZOFRAN-ODT  Take 1 tablet (8 mg total) by mouth every 8 (eight) hours as needed (nausea).     rOPINIRole 0.25 MG tablet  Commonly known as: REQUIP  Take 1 tablet (0.25 mg total) by mouth every evening.           * This list has 4 medication(s) that are the same as other medications prescribed for you. Read the directions carefully, and ask your doctor or other care provider to review them with you.                STOP taking these medications      BD ULTRA-FINE TANESHA PEN NEEDLE 32 gauge x 5/32" Ndle  Generic drug: pen needle, diabetic     ferrous sulfate " 325 mg (65 mg iron) Tab tablet  Commonly known as: IRON     HumaLOG KwikPen Insulin 100 unit/mL pen  Generic drug: insulin lispro     lancing device Misc     LEVEMIR FLEXPEN 100 unit/mL (3 mL) Inpn pen  Generic drug: insulin detemir U-100 (Levemir)     OLANZapine 5 MG tablet  Commonly known as: ZyPREXA     ONETOUCH DELICA PLUS LANCET 33 gauge Misc  Generic drug: lancets     pantoprazole 40 MG tablet  Commonly known as: PROTONIX     tadalafiL 5 MG tablet  Commonly known as: CIALIS     TRULICITY 1.5 mg/0.5 mL pen injector  Generic drug: dulaglutide                _________________________________  Leandro Turcios MD  08/24/2024

## 2024-08-24 NOTE — PLAN OF CARE
POC reviewed with patient; understanding verbalized. Pt is AAOx4. 1 unit of blood was given, pt tolerated well. MD on call notified of pts request to change code status to Full code. PRN Xanax was given. Pt. with nonskid footwear on, bed in lowest position, and locked with bed rails up x2.  Pt. instructed to call prior to getting OOB.  Pt. has call light and personal items within reach. Patient ambulates in room independently. Wife at bedside. VSS and afebrile this shift. Hospice consult pending. All questions and concerns addressed at this time. Will continue to monitor.

## 2024-08-24 NOTE — HOSPITAL COURSE
Mr. Li was admitted to the medical oncology for symptomatic anemia w/ Hgb of 6.1. The patient has been admitted for a similar problem in the same year, and his primary oncologist (Dr. Linn) d/w him his prognosis and GOC. It was ultimately decided the patient was to transition to comfort care after 1 unit of prbc was to be transfused. After transfusion the patient remained anemic at 6.3 and it was decided by the patient that it was time for him to go home with hospice so that he may be with his family. VSS, labs otherwise stable, the patient is cleared to return home with hospice.

## 2024-09-04 DIAGNOSIS — F32.0 CURRENT MILD EPISODE OF MAJOR DEPRESSIVE DISORDER WITHOUT PRIOR EPISODE: ICD-10-CM

## 2024-09-04 RX ORDER — FLUOXETINE HYDROCHLORIDE 40 MG/1
80 CAPSULE ORAL DAILY
Qty: 60 CAPSULE | Refills: 0 | Status: SHIPPED | OUTPATIENT
Start: 2024-09-04 | End: 2024-11-03

## 2024-09-27 ENCOUNTER — TELEPHONE (OUTPATIENT)
Dept: NEUROSURGERY | Facility: CLINIC | Age: 49
End: 2024-09-27
Payer: MEDICARE

## 2024-09-27 ENCOUNTER — TELEPHONE (OUTPATIENT)
Dept: HEMATOLOGY/ONCOLOGY | Facility: CLINIC | Age: 49
End: 2024-09-27
Payer: MEDICARE

## 2024-09-27 DIAGNOSIS — H53.2 DIPLOPIA: Primary | ICD-10-CM

## 2024-09-27 DIAGNOSIS — H54.7 UNSPECIFIED VISUAL LOSS: ICD-10-CM

## 2024-09-27 NOTE — TELEPHONE ENCOUNTER
Spoke to hospice nurse Dea. The medical director was wondering if patient would be eligible for a few radiation treatments, he is having severe left eye pain. In looking at the chart, this was an area of concern prior to patient enrolling in hospice and Dr. Diaz was following him for this. Dea will reach out with any other questions.

## 2024-09-27 NOTE — TELEPHONE ENCOUNTER
Let thong know it is time for pt 3 mth f/u with MRI orbits before; thong said she will let the medical team know and they will call to schedule the pt    ----- Message from Bhakti Wilburn sent at 9/27/2024 11:13 AM CDT -----  Regarding: Advise  Contact: [phone]  Paul Sampson calling regarding Patient Advice (message) for Alysha Malin from [facility] is calling to speak with nurse regarding pt care susan advise @ [phone]

## 2024-09-27 NOTE — TELEPHONE ENCOUNTER
----- Message from Sera Saldana sent at 9/27/2024  9:27 AM CDT -----  Regarding: Patient advice  Contact: Dea [phone]          Name of Caller: Dea  with [facility]     Contact Preference:  [phone]     Nature of Call: Requesting a call have a questions about pt care and options

## 2024-10-01 ENCOUNTER — APPOINTMENT (OUTPATIENT)
Dept: RADIATION THERAPY | Facility: OTHER | Age: 49
End: 2024-10-01
Attending: RADIOLOGY
Payer: MEDICARE

## 2024-10-02 ENCOUNTER — HOSPITAL ENCOUNTER (OUTPATIENT)
Dept: RADIATION THERAPY | Facility: HOSPITAL | Age: 49
Discharge: HOME | End: 2024-10-02
Attending: INTERNAL MEDICINE
Payer: MEDICARE

## 2024-10-02 ENCOUNTER — OFFICE VISIT (OUTPATIENT)
Dept: RADIATION ONCOLOGY | Facility: CLINIC | Age: 49
End: 2024-10-02
Payer: MEDICARE

## 2024-10-02 VITALS
HEART RATE: 119 BPM | WEIGHT: 229.31 LBS | HEIGHT: 74 IN | OXYGEN SATURATION: 100 % | TEMPERATURE: 97 F | SYSTOLIC BLOOD PRESSURE: 127 MMHG | DIASTOLIC BLOOD PRESSURE: 89 MMHG | BODY MASS INDEX: 29.43 KG/M2

## 2024-10-02 DIAGNOSIS — C79.31 SECONDARY MALIGNANT NEOPLASM OF BRAIN: ICD-10-CM

## 2024-10-02 DIAGNOSIS — C50.021 MALIGNANT NEOPLASM INVOLVING BOTH NIPPLE AND AREOLA OF RIGHT BREAST IN MALE, ESTROGEN RECEPTOR NEGATIVE: Primary | ICD-10-CM

## 2024-10-02 DIAGNOSIS — Z17.1 MALIGNANT NEOPLASM INVOLVING BOTH NIPPLE AND AREOLA OF RIGHT BREAST IN MALE, ESTROGEN RECEPTOR NEGATIVE: Primary | ICD-10-CM

## 2024-10-02 DIAGNOSIS — C79.51 MALIGNANT NEOPLASM METASTATIC TO BONE: ICD-10-CM

## 2024-10-02 PROCEDURE — 4010F ACE/ARB THERAPY RXD/TAKEN: CPT | Mod: CPTII,S$GLB,, | Performed by: RADIOLOGY

## 2024-10-02 PROCEDURE — 3008F BODY MASS INDEX DOCD: CPT | Mod: CPTII,S$GLB,, | Performed by: RADIOLOGY

## 2024-10-02 PROCEDURE — 99999 PR PBB SHADOW E&M-EST. PATIENT-LVL IV: CPT | Mod: PBBFAC,,, | Performed by: RADIOLOGY

## 2024-10-02 PROCEDURE — 3074F SYST BP LT 130 MM HG: CPT | Mod: CPTII,S$GLB,, | Performed by: RADIOLOGY

## 2024-10-02 PROCEDURE — 1159F MED LIST DOCD IN RCRD: CPT | Mod: CPTII,S$GLB,, | Performed by: RADIOLOGY

## 2024-10-02 PROCEDURE — 99215 OFFICE O/P EST HI 40 MIN: CPT | Mod: S$GLB,,, | Performed by: RADIOLOGY

## 2024-10-02 PROCEDURE — 3079F DIAST BP 80-89 MM HG: CPT | Mod: CPTII,S$GLB,, | Performed by: RADIOLOGY

## 2024-10-02 NOTE — PROGRESS NOTES
PATIENT IDENTIFICATION:  Patient Name: Paul Li Jr.  MRN: [id number]  : 1975    DIAGNOSIS:  Cancer Staging   Malignant neoplasm involving both nipple and areola of right breast in male, estrogen receptor negative  Staging form: Breast, AJCC 8th Edition  - Clinical stage from 2019: Stage IV (cT1c, cN0, cM1, G2, ER-, RI-, HER2-) - Signed by Rosa Linn MD on 4/15/2024      HISTORY OF PRESENT ILLNESS:   The patient is a 48-year-old man with metastatic breast cancer.  He has been referred for consideration of re-irradiation to the left orbit.    The patient is well known to our department as he is previously received radiation to the right breast spine, and left orbit.      FINDINGS:  Vertebrae: The fatty marrow appears replaced on all pulse sequences.  On STIR images there are areas of increased signal intensity in the T11 and T12 vertebral body which extend into the lamina and into the spinous processes especially of T11 incompletely imaged on MR of the lumbar spine.  These regions demonstrate significant enhancement.  Additionally, areas of increased signal and enhancement are noted in the lamina of L1 right more than left extending into the spinous process region.  No fracture.  No epidural extension.     Marrow replacement of the sacrum and iliac bones is noted with heterogeneous enhancement.  Again no cortical disruption or invasion of the central neural canal or neural foramen is evident.  Muscles within the pelvis appear stable and symmetric.     Impression:     Persistent marrow replacement suggesting primary/metastatic bone disease     New enhancing lesions involving the T11-T12 and L1 vertebral body and posterior lamina and spinous processes.  This region is likely better evaluated on dedicated MRI of the thoracic spine if clinically warranted.  No definite cord compression is imaged.     Stable MR of the sacrum and visualized adjacent pelvic bones with no cortical disruption, new  enhancement or nerve root compression.    MRI Orbits 3/21/24 demonstrated soft tissue mass in the posterior Left orbit with compression of the optic nerve and either involvement or compression of inferior and medial rectus muscles. He received palliative radiation to Left orbit 30 Gy in 10 fx completed 4/9/24.     MRI Orbits 6/3/24 demonstrated stable to slight reduction in size of Left posterior orbital mass; no evidence of other intracranial disease.     Recently, the patient has noted increased pressure and pain behind the left eye.  He is taking Decadron BID (dose?).  He reports that decadron has helped control pain.     Oncology History   Malignant neoplasm involving both nipple and areola of right breast in male, estrogen receptor negative   5/1/2019 Imaging Significant Findings    Mammogram and US  Impression:  Right  Mass: Right breast 14 mm mass at the retroareolar anterior position. Assessment: 4 - Suspicious finding. Biopsy is recommended.      Left  There is no mammographic or sonographic evidence of malignancy.          Recommendation:  Biopsy is recommended.     5/2/2019 Biopsy    BREAST, RIGHT, RETROAREOLAR MASS, ULTRASOUND-GUIDED BIOPSY:  Invasive ductal carcinoma with lobular features.  Size of invasive carcinoma: 5 MM in greatest linear dimension within a single     5/2/2019 Breast Tumor Markers    Estrogen: Negative  Progesterone: Negative  HER2: Negative  Ki67: 80     5/17/2019 Cancer Staged    Staging form: Breast, AJCC 8th Edition  - Clinical stage from 5/17/2019: Stage IB (cT1c, cN0, cM0, G2, ER-, MT-, HER2-) - Signed by Richa Bahena MD on 5/17/2019 5/27/2019 - 7/21/2019 Chemotherapy    Treatment Summary   Plan Name: OP BREAST DOSE-DENSE AC - DOXORUBICIN CYCLOPHOSPHAMIDE Q2W  Treatment Goal: Curative  Status: Inactive  Start Date: 5/27/2019  End Date: 7/9/2019  Provider: Richa Bahena MD  Chemotherapy: DOXOrubicin chemo injection 186 mg, 60 mg/m2 = 186 mg, Intravenous, Clinic/HOD  1 time, 4 of 4 cycles  Administration: 186 mg (5/27/2019), 186 mg (6/10/2019), 186 mg (6/24/2019), 186 mg (7/8/2019)  cyclophosphamide (CYTOXAN) 600 mg/m2 = 1,865 mg in sodium chloride 0.9% 250 mL chemo infusion, 600 mg/m2 = 1,865 mg, Intravenous, Clinic/HOD 1 time, 4 of 4 cycles  Administration: 1,865 mg (5/27/2019), 1,865 mg (6/10/2019), 1,865 mg (6/24/2019), 1,865 mg (7/8/2019)     7/22/2019 - 10/15/2019 Chemotherapy    Treatment Summary   Plan Name: OP PACLITAXEL QW  Treatment Goal: Curative  Status: Inactive  Start Date: 7/24/2019  End Date: 10/8/2019  Provider: Richa Bahena MD  Chemotherapy: PACLitaxel (TAXOL) 80 mg/m2 = 246 mg in sodium chloride 0.9% 250 mL chemo infusion, 80 mg/m2 = 246 mg, Intravenous, Clinic/HOD 1 time, 12 of 12 cycles  Dose modification: 60 mg/m2 (original dose 80 mg/m2, Cycle 3), 55 mg/m2 (original dose 80 mg/m2, Cycle 7), 60 mg/m2 (original dose 80 mg/m2, Cycle 7), 50 mg/m2 (original dose 80 mg/m2, Cycle 9), 50 mg/m2 (original dose 80 mg/m2, Cycle 10)  Administration: 246 mg (7/24/2019), 246 mg (7/31/2019), 186 mg (8/7/2019), 186 mg (8/14/2019), 186 mg (8/21/2019), 186 mg (8/28/2019), 186 mg (9/4/2019), 174 mg (9/11/2019), 156 mg (9/18/2019), 156 mg (9/25/2019), 156 mg (10/1/2019), 156 mg (10/8/2019)     11/22/2019 Breast Surgery    On 11/22/19 Dr whyte performed a right breast mastectomy with SLN biopsy with pathology showing grade 2, 6 mm IDC with extensive treatment effect, no LVI with 1 LN positive 4 mm, negative margins. The on 12/17/19, Dr Whyte performed right axillary dissection with pathology still pending.     11/22/2019 Cancer Staged    ypT1b N1     12/17/2019 Breast Surgery    Surgery: Dr Shima Whyte, [phone] performed right ALND with pathology showing 14 negative lymph nodes.          1/14/2020 Tumor Conference       Post mastectomy radiation therapy: Xeloda, then possible Keytruda trial .     2/3/2020 - 3/23/2020 Radiation Therapy    Treating physician:  Speedy Nair MD   Total Dose: 50.4 Gy to the chest wall and regional lymphatics  10 Gy boost to the prostate.   Fractions: 28 fractions at 1.8 Gy per fraction  5 fractions at 2 Gy per fraction      3/20/2020 - 3/20/2020 Chemotherapy     * 4/15/2020 - 9/28/20 completed 6 cycles adjuvant Xeloda 1000 mg/m2 bid days 1-14 every 21 days  Treatment Summary   Plan Name: OP CAPECITABINE 1250MG/M2 BID Q3W  Treatment Goal: Curative  Status: Inactive  Start Date: 3/20/2020  End Date: 3/20/2020  Provider: Richa Bahena MD  Chemotherapy: [No matching medication found in this treatment plan]     6/14/2021 - 6/14/2021 Chemotherapy    Treatment Summary   Plan Name: OP CAPECITABINE 1000MG/M2 Q3W  Treatment Goal: Palliative  Status: Inactive  Start Date:   End Date:   Provider: Rosa Linn MD  Chemotherapy: capecitabine (XELODA) 500 MG Tab, 1,000 mg/m2, Oral, 2 times daily, 0 of 1 cycle, Start date: --, End date: --     8/13/2021 - 3/23/2023 Chemotherapy    Treatment Summary   Plan Name: OP BREAST NAB-PACLITAXEL D1, D8, D15 + ALPELISIB   Treatment Goal: Palliative  Status: Inactive  Start Date: 8/13/2021  End Date: 3/23/2023  Provider: Rosa Linn MD  Chemotherapy: alpelisib 300 mg/day (150 mg x 2) Tab, 300 mg (100 % of original dose 300 mg), Oral, Daily, 1 of 1 cycle, Start date: --, End date: --  Dose modification: 300 mg (original dose 300 mg, Cycle 0), 150 mg (original dose 300 mg, Cycle 0), 300 mg (original dose 300 mg, Cycle 0)     4/11/2023 Tumor Conference    Patient with progression of metastatic disease on current treatment on recent PET. Medical Oncology discussed treatment options and recommend trastuzumab.       5/19/2023 - 7/15/2023 Chemotherapy    Treatment Summary   Plan Name: OP SACITUZUMAB GOVITECAN-HZIY D1,15 Q4W (CHANGED IN C2)  Treatment Goal: Palliative  Status: Inactive  Start Date: 5/19/2023  End Date: 7/15/2023  Provider: Rosa Linn MD  Chemotherapy: sacituzumab govitecan-hziy (TRODELVY) 1,440  mg in sodium chloride 0.9% 500 mL chemo infusion, 1,524 mg, Intravenous, Clinic/HOD 1 time, 3 of 17 cycles  Administration: 1,440 mg (5/19/2023), 1,440 mg (6/9/2023), 1,440 mg (6/30/2023), 1,440 mg (7/14/2023)     8/30/2023 - 2/15/2024 Chemotherapy    Treatment Summary   Plan Name: OP SACITUZUMAB GOVITECAN-HZIY Q3W  Treatment Goal: Palliative  Status: Inactive  Start Date: 8/30/2023  End Date: 2/15/2024  Provider: Paul Durbin MD  Chemotherapy: sacituzumab govitecan-hziy (TRODELVY) 1,440 mg in sodium chloride 0.9% 500 mL chemo infusion, 1,493 mg, Intravenous, Clinic/HOD 1 time, 7 of 17 cycles  Administration: 1,440 mg (8/30/2023), 1,440 mg (9/13/2023), 1,400 mg (10/18/2023), 1,440 mg (9/29/2023), 1,440 mg (11/1/2023), 1,440 mg (12/13/2023), 1,440 mg (11/29/2023), 1,440 mg (1/3/2024), 1,440 mg (1/24/2024), 1,440 mg (2/14/2024)     3/6/2024 - 3/6/2024 Chemotherapy    Treatment Summary   Plan Name: ALPELISIB FULVESTRANT Q4W  Treatment Goal: Palliative  Status: Inactive  Start Date:   End Date:   Provider: Paul Durbin MD  Chemotherapy: fulvestrant (FASLODEX) injection 500 mg, 500 mg, Intramuscular, Clinic/HOD 1 time, 0 of 12 cycles  alpelisib (PIQRAY) 300 mg/day (150 mg x 2) Tab, 300 mg, Oral, Daily, 0 of 1 cycle, Start date: --, End date: --     3/6/2024 - 3/6/2024 Chemotherapy    Treatment Summary   Plan Name: OP BREAST FULVESTRANT  Treatment Goal: Palliative  Status: Inactive  Start Date:   End Date:   Provider: Paul Durbin MD  Chemotherapy: fulvestrant (FASLODEX) injection 500 mg, 500 mg, Intramuscular, Clinic/HOD 1 time, 0 of 12 cycles     3/21/2024 - 4/9/2024 Radiation Therapy    Treating physician: Pedro Corey    Site Technique Energy Dose/Fx (Gy) #Fx Total Dose (Gy)   Lt Orbit AP/Lt Lat 6X 3 10 / 10 30        5/17/2024 - 5/23/2024 Radiation Therapy    Treating physician: Dr. Solange Lindquist  Course: C3 [id number]    Treatment Site Ref. ID Energy Dose/Fx (Gy) #Fx Dose Correction (Gy) Total  Dose (Gy) Start Date End Date Elapsed Days   3D T10-L2 PTV T10-L2 18X 4 5 / 5 0 20 5/17/2024 5/23/2024 6   3D Sacrum PTV_Sacrum 18X 4 5 / 5 0 20 5/17/2024 5/23/2024 6            5/31/2024 -  Chemotherapy    Treatment Summary   Plan Name: OP BREAST GEMCITABINE QW  Treatment Goal: Palliative  Status: Active  Start Date: 5/31/2024  End Date: 4/25/2025 (Planned)  Provider: Rosa Linn MD  Chemotherapy: gemcitabine (GEMZAR) 2,600 mg in sodium chloride 0.9% SolP 318.4 mL chemo infusion, 2,630 mg, Intravenous, Clinic/HOD 1 time, 3 of 12 cycles  Dose modification: 800 mg/m2 (original dose 1,000 mg/m2, Cycle 1, Reason: Dose not tolerated)  Administration: 2,600 mg (5/31/2024), 2,104 mg (6/14/2024), 2,000 mg (6/28/2024), 2,104 mg (7/12/2024), 2,105 mg (7/26/2024), 2,105 mg (8/9/2024)      Tumor Conference    No [facility] clinical trials at this time. Need orbital mets stability for minimal of 6 months to consider.      The team discussed weekly Gemcitabine, as well as Eribulin. Can add Doxil     For brca mutation can add DNA damaging in that setting. There was     Will get updated PET scan this week               Bone metastases   6/10/2021 Initial Diagnosis    Bone metastases     3/6/2024 - 3/6/2024 Chemotherapy    Treatment Summary   Plan Name: ALPELISIB FULVESTRANT Q4W  Treatment Goal: Palliative  Status: Inactive  Start Date:   End Date:   Provider: Paul Durbin MD  Chemotherapy: fulvestrant (FASLODEX) injection 500 mg, 500 mg, Intramuscular, Clinic/HOD 1 time, 0 of 12 cycles  alpelisib (PIQRAY) 300 mg/day (150 mg x 2) Tab, 300 mg, Oral, Daily, 0 of 1 cycle, Start date: --, End date: --     3/6/2024 - 3/6/2024 Chemotherapy    Treatment Summary   Plan Name: OP BREAST FULVESTRANT  Treatment Goal: Palliative  Status: Inactive  Start Date:   End Date:   Provider: Paul Durbin MD  Chemotherapy: fulvestrant (FASLODEX) injection 500 mg, 500 mg, Intramuscular, Clinic/HOD 1 time, 0 of 12 cycles     5/31/2024 -  Chemotherapy     Treatment Summary   Plan Name: OP BREAST GEMCITABINE QW  Treatment Goal: Palliative  Status: Active  Start Date: 5/31/2024  End Date: 4/25/2025 (Planned)  Provider: Rosa Linn MD  Chemotherapy: gemcitabine (GEMZAR) 2,600 mg in sodium chloride 0.9% SolP 318.4 mL chemo infusion, 2,630 mg, Intravenous, Clinic/HOD 1 time, 3 of 12 cycles  Dose modification: 800 mg/m2 (original dose 1,000 mg/m2, Cycle 1, Reason: Dose not tolerated)  Administration: 2,600 mg (5/31/2024), 2,104 mg (6/14/2024), 2,000 mg (6/28/2024), 2,104 mg (7/12/2024), 2,105 mg (7/26/2024), 2,105 mg (8/9/2024)          REVIEW OF SYSTEMS:   Review of Systems   Constitutional:  Positive for weight loss. Negative for fever and malaise/fatigue.   HENT:  Negative for ear pain, hearing loss, sinus pain and sore throat.    Eyes:  Positive for photophobia and pain. Negative for blurred vision and double vision.   Respiratory:  Negative for cough, hemoptysis, shortness of breath and wheezing.    Cardiovascular:  Negative for chest pain, palpitations and leg swelling.   Gastrointestinal:  Negative for abdominal pain, blood in stool, constipation, diarrhea, heartburn, nausea and vomiting.   Genitourinary:  Negative for dysuria, frequency, hematuria and urgency.   Musculoskeletal:  Negative for back pain and joint pain.   Skin:  Negative for itching and rash.   Neurological:  Negative for tingling, focal weakness, seizures and headaches.   Psychiatric/Behavioral:  Positive for depression. The patient is nervous/anxious.        PAST MEDICAL HISTORY:  Past Medical History:   Diagnosis Date    Breast cancer     Cancer     R breast    Diabetes mellitus     HTN (hypertension)     Leg edema, right     Prediabetes     Seizures     at age 18 - stress related    Therapy     marital counseling       PAST SURGICAL HISTORY:  Past Surgical History:   Procedure Laterality Date    AXILLARY NODE DISSECTION Right 11/22/2019    Procedure: LYMPHADENECTOMY, AXILLARY RIGHT;   Surgeon: Shima Whyte MD;  Location: [facility];  Service: General;  Laterality: Right;    AXILLARY NODE DISSECTION Right 12/17/2019    Procedure: LYMPHADENECTOMY, AXILLARY;  Surgeon: Shima Whyte MD;  Location: [facility] OR [facility];  Service: General;  Laterality: Right;    BREAST BIOPSY      EXCISION OF HYDROCELE Right 10/28/2022    Procedure: HYDROCELECTOMY;  Surgeon: Anthony Cordero Jr., MD;  [facility];  Service: Urology;  Laterality: Right;  1 hour    INSERTION OF TUNNELED CENTRAL VENOUS CATHETER (CVC) WITH SUBCUTANEOUS PORT Left 5/24/2019    Procedure: [id number];  Surgeon: Shima Whyte MD;  [facility];  Service: General;  Laterality: Left;    INSERTION OF TUNNELED CENTRAL VENOUS CATHETER (CVC) WITH SUBCUTANEOUS PORT Left 9/17/2021    Procedure: INSERTION, SINGLE LUMEN CATHETER WITH PORT, WITH FLUOROSCOPIC GUIDANCE, right;  Surgeon: Gloria Holliday MD;  [facility];  Service: General;  Laterality: Left;  rapid covid test    SENTINEL LYMPH NODE BIOPSY Right 11/22/2019    Procedure: BIOPSY, LYMPH NODE, SENTINEL RIGHT;  Surgeon: Shima Whyte MD;  Location: [facility];  Service: General;  Laterality: Right;    SIMPLE MASTECTOMY Right 11/22/2019    Procedure: MASTECTOMY, SIMPLE RIGHT (CONSENT AM OF) 2.5 hr case;  Surgeon: Shima Whyte MD;  Location: [facility];  Service: General;  Laterality: Right;       ALLERGIES:   Review of patient's allergies indicates:  No Known Allergies    MEDICATIONS:  Current Outpatient Medications   Medication Sig    ALPRAZolam (XANAX) 0.5 MG tablet Take 1 tablet (0.5 mg total) by mouth 3 (three) times daily as needed for Insomnia or Anxiety.    amLODIPine (NORVASC) 10 MG tablet Take 1 tablet (10 mg total) by mouth once daily.    bisacodyL (DULCOLAX) 5 mg EC tablet Take 1 tablet (5 mg total) by mouth daily as needed for Constipation.    calcium-vitamin D3 (OYSTER SHELL CALCIUM-VIT D3) 500 mg-5 mcg (200 unit) per tablet Take 1 tablet by  mouth 2 (two) times daily with meals.    diphenoxylate-atropine 2.5-0.025 mg (LOMOTIL) 2.5-0.025 mg per tablet Take 1 tablet by mouth 4 (four) times daily as needed for Diarrhea.    FLUoxetine 40 MG capsule Take 2 capsules (80 mg total) by mouth once daily.    gabapentin (NEURONTIN) 300 MG capsule Take 3 capsules (900 mg total) by mouth 3 (three) times daily.    hydroCHLOROthiazide (HYDRODIURIL) 25 MG tablet Take 1 tablet (25 mg total) by mouth once daily.    HYDROcodone-acetaminophen (NORCO)  mg per tablet Take 1 tablet by mouth every 8 (eight) hours as needed for Pain.    ibuprofen (ADVIL,MOTRIN) 600 MG tablet Take 1 tablet (600 mg total) by mouth every 8 (eight) hours as needed for Pain.    LIDOcaine (LIDODERM) 5 % Place 1 patch onto the skin once daily. Remove & Discard patch within 12 hours or as directed by MD    LIDOcaine (LIDODERM) 5 % Place 1 patch onto the skin once daily. Remove & Discard patch within 12 hours or as directed by MD    loratadine (CLARITIN) 10 mg tablet Take 1 tablet (10 mg total) by mouth once daily.    losartan (COZAAR) 50 MG tablet Take 2 tablets (100 mg total) by mouth once daily.    metFORMIN (GLUCOPHAGE) 500 MG tablet Take 2 tablets (1,000 mg total) by mouth 2 (two) times daily with meals. Needs appointment for future refills    morphine (MS CONTIN) 15 MG 12 hr tablet Take 1 tablet (15 mg total) by mouth 2 (two) times daily.    nortriptyline (PAMELOR) 50 MG capsule Take 1 capsule (50 mg total) by mouth every evening.    ondansetron (ZOFRAN-ODT) 8 MG TbDL Dissolve 1 tablet (8 mg total) by mouth or dissolve on tongue every 8 (eight) hours as needed (nausea/vomiting).  Let saliva dissolve tablet.    ondansetron (ZOFRAN-ODT) 8 MG TbDL Take 1 tablet (8 mg total) by mouth every 8 (eight) hours as needed (nausea).    rOPINIRole (REQUIP) 0.25 MG tablet Take 1 tablet (0.25 mg total) by mouth every evening.     No current facility-administered medications for this visit.       SOCIAL  HISTORY:  Social History     Socioeconomic History    Marital status: Single    Number of children: 3   Occupational History    Occupation:    Tobacco Use    Smoking status: Former     Current packs/day: 0.00     Average packs/day: 0.3 packs/day for 15.0 years (3.8 ttl pk-yrs)     Types: Cigarettes, Vaping with nicotine     Start date: 1999     Quit date: 2014     Years since quittin.8    Smokeless tobacco: Never    Tobacco comments:     Social smoker with alcohol    Substance and Sexual Activity    Alcohol use: Yes     Alcohol/week: 0.0 standard drinks of alcohol     Comment: Rarely    Drug use: Not Currently     Types: Marijuana    Sexual activity: Yes     Partners: Female     Birth control/protection: None   Social History Narrative    From [place]    Lives with wife    3 sons in college    Wife recent loss of 6 month pregnancy (May 1, 2019)     Social Drivers of Health     Financial Resource Strain: Low Risk  (2024)    Overall Financial Resource Strain (CARDIA)     Difficulty of Paying Living Expenses: Not hard at all   Food Insecurity: No Food Insecurity (2024)    Hunger Vital Sign     Worried About Running Out of Food in the Last Year: Never true     Ran Out of Food in the Last Year: Never true   Transportation Needs: No Transportation Needs (2024)    PRAPARE - Transportation     Lack of Transportation (Medical): No     Lack of Transportation (Non-Medical): No   Recent Concern: Transportation Needs - Unmet Transportation Needs (2024)    PRAPARE - Transportation     Lack of Transportation (Medical): Yes     Lack of Transportation (Non-Medical): Yes   Physical Activity: Insufficiently Active (2024)    Exercise Vital Sign     Days of Exercise per Week: 3 days     Minutes of Exercise per Session: 20 min   Stress: No Stress Concern Present (2024)    [employer] [place] [employer] - Occupational Stress Questionnaire     Feeling of Stress : Not at  all   Recent Concern: Stress - Stress Concern Present (2024)    [facility] [place] [facility] [employer] - Occupational Stress Questionnaire     Feeling of Stress : To some extent   Housing Stability: Low Risk  (2024)    Housing Stability Vital Sign     Unable to Pay for Housing in the Last Year: No     Homeless in the Last Year: No   Recent Concern: Housing Stability - High Risk (2024)    Housing Stability Vital Sign     Unable to Pay for Housing in the Last Year: Yes     Number of Places Lived in the Last Year: 1     Unstable Housing in the Last Year: No       FAMILY HISTORY:  Family History   Problem Relation Name Age of Onset    Hypertension Mother      Diabetes Mother      Hypertension Father      Lupus Father      Post-traumatic stress disorder Brother      No Known Problems Maternal Grandmother      Colon cancer Maternal Grandfather      Breast cancer Paternal Grandmother      No Known Problems Paternal Grandfather      No Known Problems Sister      No Known Problems Maternal Aunt      No Known Problems Maternal Uncle      Fibromyalgia Paternal Aunt      Lupus Paternal Aunt      No Known Problems Paternal Uncle      No Known Problems Brother      No Known Problems Sister      No Known Problems Son      No Known Problems Son      No Known Problems Son      No Known Problems Son           PHYSICAL EXAMINATION:  Vitals:    10/02/24 1416   BP: 127/89   Pulse: (!) 119   Temp: 97.4 °F (36.3 °C)     Body mass index is 29.44 kg/m².    ECO  Physical Exam  Constitutional:       Appearance: Normal appearance.      Comments: Ambulates with cane   HENT:      Head: Normocephalic and atraumatic.      Comments: Diminished vision in the left eye     Nose: Nose normal.      Mouth/Throat:      Mouth: Mucous membranes are moist.   Eyes:      Comments: Vision absent in left eye   Cardiovascular:      Rate and Rhythm: Normal rate.   Pulmonary:      Effort: Pulmonary effort is normal.   Abdominal:      General:  Abdomen is flat.      Palpations: Abdomen is soft.   Musculoskeletal:         General: Normal range of motion.      Cervical back: Normal range of motion.   Skin:     General: Skin is warm and dry.   Neurological:      Mental Status: He is alert and oriented to person, place, and time. Mental status is at baseline.             ASSESSMENT/PLAN:  There are no diagnoses linked to this encounter.    The patient is recommended palliative radiation to the left orbit.  Will plan to re-irradiate the area to a dose of 12 Gy in 3 fractions.  Advised that he will not recover vision in that eye with re-treatment.  Goal of treatment is to control pain associated with orbital tumor.  Will perform CT simulation today with the plan to start radiation next week on 10/8/24.    The risks and benefits of treatment have been discussed with the patient and he expressed full understanding. he understands the treatment plan and willing to proceed accordingly.

## 2024-10-08 PROCEDURE — 77014 PR  CT GUIDANCE PLACEMENT RAD THERAPY FIELDS: CPT | Mod: 26,,, | Performed by: RADIOLOGY

## 2024-10-08 PROCEDURE — 77386 HC IMRT, COMPLEX: CPT | Performed by: RADIOLOGY

## 2024-10-09 PROCEDURE — 77386 HC IMRT, COMPLEX: CPT | Performed by: RADIOLOGY

## 2024-10-09 PROCEDURE — 77014 PR  CT GUIDANCE PLACEMENT RAD THERAPY FIELDS: CPT | Mod: 26,,, | Performed by: RADIOLOGY

## 2024-10-10 PROCEDURE — 77014 PR  CT GUIDANCE PLACEMENT RAD THERAPY FIELDS: CPT | Mod: 26,,, | Performed by: RADIOLOGY

## 2024-10-10 PROCEDURE — 77386 HC IMRT, COMPLEX: CPT | Performed by: RADIOLOGY

## 2024-10-15 DIAGNOSIS — E11.65 UNCONTROLLED TYPE 2 DIABETES MELLITUS WITH HYPERGLYCEMIA: ICD-10-CM

## 2024-10-15 DIAGNOSIS — F32.0 CURRENT MILD EPISODE OF MAJOR DEPRESSIVE DISORDER WITHOUT PRIOR EPISODE: ICD-10-CM

## 2024-10-15 PROCEDURE — 77336 RADIATION PHYSICS CONSULT: CPT | Performed by: RADIOLOGY

## 2024-10-15 RX ORDER — FLUOXETINE HYDROCHLORIDE 40 MG/1
80 CAPSULE ORAL DAILY
Qty: 60 CAPSULE | Refills: 0 | Status: SHIPPED | OUTPATIENT
Start: 2024-10-15 | End: 2024-12-14

## 2024-10-15 RX ORDER — METFORMIN HYDROCHLORIDE 500 MG/1
1000 TABLET ORAL 2 TIMES DAILY WITH MEALS
Qty: 120 TABLET | Refills: 2 | OUTPATIENT
Start: 2024-10-15 | End: 2025-01-13

## 2024-10-24 ENCOUNTER — PATIENT MESSAGE (OUTPATIENT)
Dept: HEMATOLOGY/ONCOLOGY | Facility: CLINIC | Age: 49
End: 2024-10-24
Payer: MEDICARE

## 2024-11-14 DIAGNOSIS — F32.0 CURRENT MILD EPISODE OF MAJOR DEPRESSIVE DISORDER WITHOUT PRIOR EPISODE: ICD-10-CM

## 2024-11-14 DIAGNOSIS — E11.65 UNCONTROLLED TYPE 2 DIABETES MELLITUS WITH HYPERGLYCEMIA: ICD-10-CM

## 2024-11-14 RX ORDER — METFORMIN HYDROCHLORIDE 500 MG/1
1000 TABLET ORAL 2 TIMES DAILY WITH MEALS
Qty: 120 TABLET | Refills: 2 | Status: SHIPPED | OUTPATIENT
Start: 2024-11-14 | End: 2025-02-12

## 2024-11-14 RX ORDER — FLUOXETINE HYDROCHLORIDE 40 MG/1
80 CAPSULE ORAL DAILY
Qty: 60 CAPSULE | Refills: 0 | Status: SHIPPED | OUTPATIENT
Start: 2024-11-14 | End: 2025-01-13

## 2024-11-26 DIAGNOSIS — G62.0 CHEMOTHERAPY-INDUCED NEUROPATHY: ICD-10-CM

## 2024-11-26 DIAGNOSIS — T45.1X5A CHEMOTHERAPY-INDUCED NEUROPATHY: ICD-10-CM

## 2024-11-26 RX ORDER — GABAPENTIN 300 MG/1
900 CAPSULE ORAL 3 TIMES DAILY
Qty: 270 CAPSULE | Refills: 2 | Status: SHIPPED | OUTPATIENT
Start: 2024-11-26

## (undated) DEVICE — SOL NACL 0.9% INJ PF/50151

## (undated) DEVICE — SUT VICRYL 3-0 27 SH

## (undated) DEVICE — PACK UNIV PROCEDURE

## (undated) DEVICE — ADHESIVE DERMABOND ADVANCED

## (undated) DEVICE — SET DECANTER MEDICHOICE

## (undated) DEVICE — TRAY MINOR GEN SURG

## (undated) DEVICE — APPLICATOR CHLORAPREP ORN 26ML

## (undated) DEVICE — BLADE PEAK PLASMA

## (undated) DEVICE — DRAPE C ARM 42 X 120 10/BX

## (undated) DEVICE — EVACUATOR WOUND BULB 100CC

## (undated) DEVICE — SUT VICRYL CTD 2-0 GI 27 SH

## (undated) DEVICE — DRESSING TELFA STRL 4X3 LF

## (undated) DEVICE — PANTIES FEMININE NAPKIN LG/XLG

## (undated) DEVICE — Device

## (undated) DEVICE — STAPLER SKIN ROTATING HEAD

## (undated) DEVICE — DRESSING TRANS 4X4 TEGADERM

## (undated) DEVICE — COVER PROBE SHEATH SURG 6X3IN

## (undated) DEVICE — SPONGE IV DRAIN 4X4 STERILE

## (undated) DEVICE — BLADE SURG CARBON STEEL SZ11

## (undated) DEVICE — STOCKINET 4INX48

## (undated) DEVICE — SOL NACL .9% 250ML INJ

## (undated) DEVICE — POWDER ARISTA AH 3G

## (undated) DEVICE — SYR 10CC LUER LOCK

## (undated) DEVICE — ELECTRODE BLADE INSULATED 1 IN

## (undated) DEVICE — APPLIER CLIP LIAGCLIP 9.375IN

## (undated) DEVICE — SPONGE LAP 18X18 PREWASHED

## (undated) DEVICE — UNDERGLOVES BIOGEL PI SZ 7 LF

## (undated) DEVICE — CLIPPER BLADE MOD 4406 (CAREF)

## (undated) DEVICE — SEE MEDLINE ITEM 157131

## (undated) DEVICE — GAUZE FLUFF XXLG 36X36 2 PLY

## (undated) DEVICE — NDL HYPO REG 25G X 1 1/2

## (undated) DEVICE — PAD ABD 8X10 STERILE

## (undated) DEVICE — SYS LABEL CORRECT MED

## (undated) DEVICE — ELECTRODE REM PLYHSV RETURN 9

## (undated) DEVICE — GLOVE BIOGEL SKINSENSE PI 6.5

## (undated) DEVICE — SUT 2/0 30IN SILK BLK BRAI

## (undated) DEVICE — TRAY MINOR GEN SURG OMC

## (undated) DEVICE — DRESSING DERMACEA SPNG 10S

## (undated) DEVICE — GLOVE BIOGEL SKINSENSE PI 7.5

## (undated) DEVICE — BRIEF MESH LARGE

## (undated) DEVICE — SUT MCRYL PLUS 4-0 PS2 27IN

## (undated) DEVICE — SYR ONLY LUER LOCK 20CC

## (undated) DEVICE — SEE MEDLINE ITEM 146417

## (undated) DEVICE — SUT VICRYL PLUS 3-0 18IN

## (undated) DEVICE — SKIN MARKER DEVON 160

## (undated) DEVICE — DRAPE THYROID WITH ARMBOARD

## (undated) DEVICE — CONTAINER SPECIMEN STRL 4OZ

## (undated) DEVICE — PACK UNIVERSAL SPLIT II

## (undated) DEVICE — SEE MEDLINE ITEM 157128

## (undated) DEVICE — DRAIN CHANNEL ROUND 15FR

## (undated) DEVICE — GAUZE SPONGE 4X4 12PLY

## (undated) DEVICE — DRESSING ANTIMICROBIAL 1 INCH

## (undated) DEVICE — SEE MEDLINE ITEM 152622

## (undated) DEVICE — ELECTRODE BLADE TEFLON 6

## (undated) DEVICE — GLOVE BIOGEL SKINSENSE PI 7.0

## (undated) DEVICE — GLOVE BIOGEL SKINSENSE PI 8.0

## (undated) DEVICE — DRAPE LAP T SHT W/ INSTR PAD

## (undated) DEVICE — ELECTRODE BLD EXT INSUL 1

## (undated) DEVICE — SUT CHROMIC 3-0 SH 27IN GUT

## (undated) DEVICE — POSITIONER IV ARMBOARD FOAM

## (undated) DEVICE — CHLORAPREP 10.5 ML APPLICATOR

## (undated) DEVICE — SUT ETHILON 2-0 PSLX 30IN

## (undated) DEVICE — DRAPE STERI INSTRUMENT 1018

## (undated) DEVICE — APPLIER LIGACLIP MED 11IN

## (undated) DEVICE — SEE MEDLINE ITEM 157110

## (undated) DEVICE — SUT PROLENE 2-0 30 SH

## (undated) DEVICE — SUT 2-0 VICRYL / SH (J417)

## (undated) DEVICE — TAPE ADH MEDIPORE 4 X 10YDS

## (undated) DEVICE — SEE MEDLINE ITEM 157117

## (undated) DEVICE — DRAIN PENROSE XRAY 18 X 1/4 ST

## (undated) DEVICE — GOWN SURGICAL X-LARGE